# Patient Record
Sex: MALE | Race: WHITE | Employment: OTHER | URBAN - METROPOLITAN AREA
[De-identification: names, ages, dates, MRNs, and addresses within clinical notes are randomized per-mention and may not be internally consistent; named-entity substitution may affect disease eponyms.]

---

## 2018-04-13 PROBLEM — I35.0 NONRHEUMATIC AORTIC VALVE STENOSIS: Status: ACTIVE | Noted: 2018-04-13

## 2018-10-19 PROBLEM — E66.01 OBESITY, MORBID (HCC): Status: ACTIVE | Noted: 2018-10-19

## 2019-01-01 ENCOUNTER — TELEPHONE (OUTPATIENT)
Dept: CASE MANAGEMENT | Age: 74
End: 2019-01-01

## 2019-01-01 ENCOUNTER — HOSPITAL ENCOUNTER (OUTPATIENT)
Dept: CARDIAC CATH/INVASIVE PROCEDURES | Age: 74
Discharge: HOME | End: 2019-12-23
Attending: INTERNAL MEDICINE | Admitting: INTERNAL MEDICINE
Payer: MEDICARE

## 2019-01-01 ENCOUNTER — HOSPITAL ENCOUNTER (OUTPATIENT)
Dept: CT IMAGING | Age: 74
Discharge: HOME | End: 2019-11-20
Attending: INTERNAL MEDICINE
Payer: MEDICARE

## 2019-01-01 ENCOUNTER — HOSPITAL ENCOUNTER (OUTPATIENT)
Dept: ULTRASOUND IMAGING | Age: 74
Discharge: HOME | End: 2019-11-20
Attending: INTERNAL MEDICINE
Payer: MEDICARE

## 2019-01-01 ENCOUNTER — HOSPITAL ENCOUNTER (OUTPATIENT)
Dept: CARDIAC CATH/INVASIVE PROCEDURES | Age: 74
Discharge: HOME | End: 2019-12-16

## 2019-01-01 VITALS
HEIGHT: 70 IN | RESPIRATION RATE: 16 BRPM | SYSTOLIC BLOOD PRESSURE: 142 MMHG | HEART RATE: 78 BPM | WEIGHT: 306 LBS | DIASTOLIC BLOOD PRESSURE: 69 MMHG | BODY MASS INDEX: 43.81 KG/M2 | OXYGEN SATURATION: 97 %

## 2019-01-01 VITALS — WEIGHT: 306 LBS | BODY MASS INDEX: 43.81 KG/M2 | HEIGHT: 70 IN

## 2019-01-01 DIAGNOSIS — I35.0 AORTIC VALVE STENOSIS, ETIOLOGY OF CARDIAC VALVE DISEASE UNSPECIFIED: ICD-10-CM

## 2019-01-01 DIAGNOSIS — I35.0 AORTIC VALVE STENOSIS, ETIOLOGY OF CARDIAC VALVE DISEASE UNSPECIFIED: Primary | ICD-10-CM

## 2019-01-01 DIAGNOSIS — I25.10 CORONARY ARTERY DISEASE INVOLVING NATIVE CORONARY ARTERY OF NATIVE HEART, ANGINA PRESENCE UNSPECIFIED: Primary | ICD-10-CM

## 2019-01-01 LAB
ACT BLD: 191 SECS (ref 70–128)
ANION GAP SERPL CALC-SCNC: 5 MMOL/L (ref 7–16)
ANION GAP SERPL CALC-SCNC: 6 MMOL/L (ref 7–16)
ATRIAL RATE: 80 BPM
BUN SERPL-MCNC: 25 MG/DL (ref 8–23)
BUN SERPL-MCNC: 25 MG/DL (ref 8–23)
CALCIUM SERPL-MCNC: 8.9 MG/DL (ref 8.3–10.4)
CALCIUM SERPL-MCNC: 9.3 MG/DL (ref 8.3–10.4)
CALCULATED P AXIS, ECG09: 31 DEGREES
CALCULATED R AXIS, ECG10: 43 DEGREES
CALCULATED T AXIS, ECG11: 106 DEGREES
CHLORIDE SERPL-SCNC: 110 MMOL/L (ref 98–107)
CHLORIDE SERPL-SCNC: 110 MMOL/L (ref 98–107)
CO2 SERPL-SCNC: 25 MMOL/L (ref 21–32)
CO2 SERPL-SCNC: 26 MMOL/L (ref 21–32)
CREAT BLD-MCNC: 2 MG/DL (ref 0.8–1.5)
CREAT SERPL-MCNC: 1.8 MG/DL (ref 0.8–1.5)
CREAT SERPL-MCNC: 1.93 MG/DL (ref 0.8–1.5)
DIAGNOSIS, 93000: NORMAL
ERYTHROCYTE [DISTWIDTH] IN BLOOD BY AUTOMATED COUNT: 12 % (ref 11.9–14.6)
GLUCOSE SERPL-MCNC: 125 MG/DL (ref 65–100)
GLUCOSE SERPL-MCNC: 130 MG/DL (ref 65–100)
HCT VFR BLD AUTO: 36.2 % (ref 41.1–50.3)
HGB BLD-MCNC: 11.7 G/DL (ref 13.6–17.2)
INR PPP: 1
MAGNESIUM SERPL-MCNC: 2.5 MG/DL (ref 1.8–2.4)
MCH RBC QN AUTO: 31.6 PG (ref 26.1–32.9)
MCHC RBC AUTO-ENTMCNC: 32.3 G/DL (ref 31.4–35)
MCV RBC AUTO: 97.8 FL (ref 79.6–97.8)
NRBC # BLD: 0 K/UL (ref 0–0.2)
P-R INTERVAL, ECG05: 160 MS
PLATELET # BLD AUTO: 319 K/UL (ref 150–450)
PMV BLD AUTO: 10.2 FL (ref 9.4–12.3)
POTASSIUM SERPL-SCNC: 4.9 MMOL/L (ref 3.5–5.1)
POTASSIUM SERPL-SCNC: 5.7 MMOL/L (ref 3.5–5.1)
PROTHROMBIN TIME: 13.4 SEC (ref 11.7–14.5)
Q-T INTERVAL, ECG07: 380 MS
QRS DURATION, ECG06: 112 MS
QTC CALCULATION (BEZET), ECG08: 438 MS
RBC # BLD AUTO: 3.7 M/UL (ref 4.23–5.6)
SODIUM SERPL-SCNC: 141 MMOL/L (ref 136–145)
SODIUM SERPL-SCNC: 141 MMOL/L (ref 136–145)
VENTRICULAR RATE, ECG03: 80 BPM
WBC # BLD AUTO: 8.5 K/UL (ref 4.3–11.1)

## 2019-01-01 PROCEDURE — 93005 ELECTROCARDIOGRAM TRACING: CPT | Performed by: INTERNAL MEDICINE

## 2019-01-01 PROCEDURE — C1887 CATHETER, GUIDING: HCPCS

## 2019-01-01 PROCEDURE — 75574 CT ANGIO HRT W/3D IMAGE: CPT

## 2019-01-01 PROCEDURE — 74011636320 HC RX REV CODE- 636/320: Performed by: INTERNAL MEDICINE

## 2019-01-01 PROCEDURE — 93454 CORONARY ARTERY ANGIO S&I: CPT

## 2019-01-01 PROCEDURE — 74011000258 HC RX REV CODE- 258: Performed by: INTERNAL MEDICINE

## 2019-01-01 PROCEDURE — C1894 INTRO/SHEATH, NON-LASER: HCPCS

## 2019-01-01 PROCEDURE — 74011250637 HC RX REV CODE- 250/637: Performed by: INTERNAL MEDICINE

## 2019-01-01 PROCEDURE — 82565 ASSAY OF CREATININE: CPT

## 2019-01-01 PROCEDURE — 99152 MOD SED SAME PHYS/QHP 5/>YRS: CPT

## 2019-01-01 PROCEDURE — 77030004534 HC CATH ANGI DX INFN CARD -A

## 2019-01-01 PROCEDURE — 74011250636 HC RX REV CODE- 250/636: Performed by: INTERNAL MEDICINE

## 2019-01-01 PROCEDURE — 77030015766

## 2019-01-01 PROCEDURE — 85610 PROTHROMBIN TIME: CPT

## 2019-01-01 PROCEDURE — C1769 GUIDE WIRE: HCPCS

## 2019-01-01 PROCEDURE — 85027 COMPLETE CBC AUTOMATED: CPT

## 2019-01-01 PROCEDURE — 74011000250 HC RX REV CODE- 250: Performed by: INTERNAL MEDICINE

## 2019-01-01 PROCEDURE — 77030029997 HC DEV COM RDL R BND TELE -B

## 2019-01-01 PROCEDURE — 93880 EXTRACRANIAL BILAT STUDY: CPT

## 2019-01-01 PROCEDURE — 71275 CT ANGIOGRAPHY CHEST: CPT

## 2019-01-01 PROCEDURE — 85347 COAGULATION TIME ACTIVATED: CPT

## 2019-01-01 PROCEDURE — 83735 ASSAY OF MAGNESIUM: CPT

## 2019-01-01 PROCEDURE — 80048 BASIC METABOLIC PNL TOTAL CA: CPT

## 2019-01-01 PROCEDURE — 93571 IV DOP VEL&/PRESS C FLO 1ST: CPT

## 2019-01-01 RX ORDER — SODIUM CHLORIDE 0.9 % (FLUSH) 0.9 %
5-40 SYRINGE (ML) INJECTION AS NEEDED
Status: CANCELLED | OUTPATIENT
Start: 2019-01-01

## 2019-01-01 RX ORDER — MIDAZOLAM HYDROCHLORIDE 1 MG/ML
.5-5 INJECTION, SOLUTION INTRAMUSCULAR; INTRAVENOUS
Status: DISCONTINUED | OUTPATIENT
Start: 2019-01-01 | End: 2019-01-01 | Stop reason: HOSPADM

## 2019-01-01 RX ORDER — FENTANYL CITRATE 50 UG/ML
25-100 INJECTION, SOLUTION INTRAMUSCULAR; INTRAVENOUS
Status: DISCONTINUED | OUTPATIENT
Start: 2019-01-01 | End: 2019-01-01 | Stop reason: HOSPADM

## 2019-01-01 RX ORDER — HYDROCODONE BITARTRATE AND ACETAMINOPHEN 5; 325 MG/1; MG/1
1 TABLET ORAL
Status: CANCELLED | OUTPATIENT
Start: 2019-01-01

## 2019-01-01 RX ORDER — HEPARIN SODIUM 200 [USP'U]/100ML
3 INJECTION, SOLUTION INTRAVENOUS CONTINUOUS
Status: DISCONTINUED | OUTPATIENT
Start: 2019-01-01 | End: 2019-01-01 | Stop reason: HOSPADM

## 2019-01-01 RX ORDER — HEPARIN SODIUM 10000 [USP'U]/ML
6000 INJECTION, SOLUTION INTRAVENOUS; SUBCUTANEOUS ONCE
Status: COMPLETED | OUTPATIENT
Start: 2019-01-01 | End: 2019-01-01

## 2019-01-01 RX ORDER — SODIUM CHLORIDE 9 MG/ML
1 INJECTION, SOLUTION INTRAVENOUS AS NEEDED
Status: DISCONTINUED | OUTPATIENT
Start: 2019-01-01 | End: 2019-01-01 | Stop reason: HOSPADM

## 2019-01-01 RX ORDER — SODIUM CHLORIDE 9 MG/ML
75 INJECTION, SOLUTION INTRAVENOUS CONTINUOUS
Status: CANCELLED | OUTPATIENT
Start: 2019-01-01

## 2019-01-01 RX ORDER — SODIUM CHLORIDE 0.9 % (FLUSH) 0.9 %
10 SYRINGE (ML) INJECTION
Status: COMPLETED | OUTPATIENT
Start: 2019-01-01 | End: 2019-01-01

## 2019-01-01 RX ORDER — SODIUM CHLORIDE 9 MG/ML
3 INJECTION, SOLUTION INTRAVENOUS ONCE
Status: COMPLETED | OUTPATIENT
Start: 2019-01-01 | End: 2019-01-01

## 2019-01-01 RX ORDER — DIAZEPAM 5 MG/1
5 TABLET ORAL ONCE
Status: COMPLETED | OUTPATIENT
Start: 2019-01-01 | End: 2019-01-01

## 2019-01-01 RX ORDER — GUAIFENESIN 100 MG/5ML
81-324 LIQUID (ML) ORAL ONCE
Status: DISCONTINUED | OUTPATIENT
Start: 2019-01-01 | End: 2019-01-01 | Stop reason: HOSPADM

## 2019-01-01 RX ORDER — ONDANSETRON 2 MG/ML
4 INJECTION INTRAMUSCULAR; INTRAVENOUS
Status: CANCELLED | OUTPATIENT
Start: 2019-01-01 | End: 2019-01-01

## 2019-01-01 RX ORDER — LIDOCAINE HYDROCHLORIDE 10 MG/ML
1-20 INJECTION INFILTRATION; PERINEURAL ONCE
Status: COMPLETED | OUTPATIENT
Start: 2019-01-01 | End: 2019-01-01

## 2019-01-01 RX ORDER — ACETAMINOPHEN 325 MG/1
650 TABLET ORAL
Status: CANCELLED | OUTPATIENT
Start: 2019-01-01

## 2019-01-01 RX ORDER — SODIUM CHLORIDE 9 MG/ML
1 INJECTION, SOLUTION INTRAVENOUS CONTINUOUS
Status: ACTIVE | OUTPATIENT
Start: 2019-01-01 | End: 2019-01-01

## 2019-01-01 RX ORDER — SODIUM CHLORIDE 0.9 % (FLUSH) 0.9 %
5-40 SYRINGE (ML) INJECTION EVERY 8 HOURS
Status: CANCELLED | OUTPATIENT
Start: 2019-01-01

## 2019-01-01 RX ADMIN — IOPAMIDOL 100 ML: 755 INJECTION, SOLUTION INTRAVENOUS at 13:15

## 2019-01-01 RX ADMIN — MIDAZOLAM 2 MG: 1 INJECTION INTRAMUSCULAR; INTRAVENOUS at 13:22

## 2019-01-01 RX ADMIN — IOPAMIDOL 110 ML: 755 INJECTION, SOLUTION INTRAVENOUS at 13:47

## 2019-01-01 RX ADMIN — HEPARIN SODIUM 6000 UNITS: 10000 INJECTION INTRAVENOUS; SUBCUTANEOUS at 13:36

## 2019-01-01 RX ADMIN — LIDOCAINE HYDROCHLORIDE 3 ML: 10 INJECTION, SOLUTION INFILTRATION; PERINEURAL at 14:00

## 2019-01-01 RX ADMIN — SODIUM CHLORIDE 100 ML: 900 INJECTION, SOLUTION INTRAVENOUS at 13:15

## 2019-01-01 RX ADMIN — Medication 10 ML: at 13:15

## 2019-01-01 RX ADMIN — NITROGLYCERIN 0.15 MG: 200 INJECTION, SOLUTION INTRAVENOUS at 13:41

## 2019-01-01 RX ADMIN — FENTANYL CITRATE 50 MCG: 50 INJECTION, SOLUTION INTRAMUSCULAR; INTRAVENOUS at 13:22

## 2019-01-01 RX ADMIN — SODIUM CHLORIDE 3 ML/KG/HR: 900 INJECTION, SOLUTION INTRAVENOUS at 10:40

## 2019-01-01 RX ADMIN — HEPARIN SODIUM 3 ML/HR: 5000 INJECTION, SOLUTION INTRAVENOUS; SUBCUTANEOUS at 13:15

## 2019-01-01 RX ADMIN — HEPARIN SODIUM 2 ML: 10000 INJECTION INTRAVENOUS; SUBCUTANEOUS at 13:26

## 2019-01-01 RX ADMIN — DIAZEPAM 5 MG: 5 TABLET ORAL at 11:39

## 2019-01-01 RX ADMIN — SODIUM CHLORIDE 1 ML/KG/HR: 900 INJECTION, SOLUTION INTRAVENOUS at 09:15

## 2019-08-26 PROBLEM — E11.21 TYPE 2 DIABETES WITH NEPHROPATHY (HCC): Status: ACTIVE | Noted: 2019-01-01

## 2019-11-20 NOTE — PROGRESS NOTES
IVF started for TAVR CT scan today. Pt is A&O x4 and resting comfortably in the chair in CT holding bay. Pt will be monitored by holding bay staff with plans for CT at 1200.

## 2019-11-24 PROBLEM — I65.23 BILATERAL CAROTID ARTERY STENOSIS: Status: ACTIVE | Noted: 2019-01-01

## 2019-12-09 NOTE — TELEPHONE ENCOUNTER
Pt and spouse both have a cold and are on antibiotics and pt wants to change the date of C. Moved this to 12/16 0800 arrival at 0600. Instructed pt and spuse that he instructions from previous would remain the same with only the date changed. Went over instruction in detail with spuse and pt again and both vu.     Scheduled for 12/16 at 0800, arrival time 2 hr prior. Patient instructed to bring all home medications in labeled bottles on the day of procedure. NPO after midnight on 12/16 except for medications. Patient informed to take 81 mg x 4 on the day of procedure.      Hold Lisinopril on 12/16.   Hold Glucovance 24 hr prior to procedure  Take 1/2 of insulin dose the night prior to the procedure and hold on 12/16 am, day of procedure.  Richie Montero, TAVR Coordinator

## 2019-12-16 NOTE — TELEPHONE ENCOUNTER
Pt called to say him and his wife are sick and wants to cancel his cardiac cath for Monday at 0800. Told pt we would reschedule for a time after he feels well and to notify us.  I notified Kenny Alonzo, from cathlab, also to inform of cancellation for Monday am.    Hina Adan, TAVR Coordinator

## 2019-12-17 NOTE — TELEPHONE ENCOUNTER
Called and spoke to pt and spouse to reschedule [facility]. Plans for 12/23 1330 arrival at 1130.    Scheduled for 12/23 at 1330, arrival time 2 hr prior. Patient instructed to bring all home medications in labeled bottles on the day of procedure. NPO after midnight on 12/23 except for medications. Patient informed to take 81 mg x 4 on the day of procedure.      Hold Lisinopril on 12/16.   Hold Glucovance 24 hr prior to procedure  Take 1/2 of insulin dose the night prior to the procedure and hold on 12/16 am, day of procedure.  Katarzyna Cooper, TAVR Coordinator

## 2019-12-21 NOTE — PROGRESS NOTES
Patient pre-assessment complete for [facility] poss with DR De Oliveira scheduled for 19 at 2pm, arrival time 11:30am - HYDRATION. Patient verified using . Patient instructed to bring all home medications in labeled bottles on the day of procedure. NPO status reinforced. Patient informed to take a full dose aspirin 325mg  or 81 mg x 4 on the day of procedure. Patient instructed to HOLD glucovance, insulin & lisinopril/HCTZ on the am of procedure & to take 1/2 insulin the am of procedure. Instructed they can take all other medications excluding vitamins & supplements. Patient verbalizes understanding of all instructions & denies any questions at this time. Pt was asked to come in earlier secondary to need for HYDRATION & secondary to opening in the schedule, states that the earliest he can be there is around 11:30am, informed that we could take care of him as early as he can get there. Voiced good understanding

## 2019-12-23 NOTE — PROCEDURES
Brief Cardiac Procedure Note Patient: Rafia Diaz MRN: [id number]  SSN: [id number] YOB: 1945  Age: 69 y.o. Sex: male Date of Procedure: 12/23/2019 Pre-procedure Diagnosis: Aortic Stenosis Post-procedure Diagnosis: Multi-vessel CAD and aoritc stenosis. . 
 
Procedure: Left Heart Catheterization Brief Description of Procedure: See above Performed By: Kristie Duverney, MD  
 
Assistants: None Anesthesia: Moderate Sedation Estimated Blood Loss: Less than 10 mL Specimens: None Implants: None Findings: Multivessel CAD Complications: None Recommendations: CTS evaluation for CABG and AVR evaluation. Graft Targets:   LAD, OM and RCA Signed By: Kristie Duverney, MD   
 December 23, 2019

## 2019-12-23 NOTE — PROGRESS NOTES
TRANSFER - OUT REPORT: 
 
Verbal report given to [facility] rn(name) on [facility]  being transferred to cpru(unit) for routine progression of care Report consisted of patients Situation, Background, Assessment and  
Recommendations(SBAR). Information from the following report(s) SBAR was reviewed with the receiving nurse. Lines:  
Peripheral IV 12/23/19 Left;Posterior Hand (Active) Peripheral IV 12/23/19 Posterior;Right Hand (Active) Opportunity for questions and clarification was provided. Patient transported with: 
 Registered Nurse [facility] Dr Elma Mehta CV consult Right wrist tr band 8ml No bleeding or hematoma Versed 2mg 
fenatnyl 50mcg

## 2019-12-23 NOTE — PROGRESS NOTES
Patient received to [address] [place] [address] room # 6  Ambulatory from [facility]. Patient scheduled for [facility] today with Dr Aric Teague. Procedure reviewed & questions answered, voiced good understanding consent obtained & placed on chart. All medications and medical history reviewed. Will prep patient per orders. Patient & family updated on plan of care. The patient has a fraility score of 4-VULNERABLE, based on patient does requires cane assistance to prep room, patient A&Ox3.

## 2019-12-23 NOTE — H&P
[facility] CARDIOLOGY History & Physical 
            
 
 
Subjective:  
 
Patient is a 56-year-old male with severe symptomatic aortic stenosis. Significant dyspnea with exertion. Cardiac CT scan was performed that showed significant multivessel coronary disease. He presents today for cardiac catheterization. He is nervous about this procedure. He denies any palpitations or tachycardia. Dyspnea with exertion, stable. No chest pain. Past Medical History:  
Diagnosis Date  BPH (benign prostatic hyperplasia) 1/14/2013  DM type 2 (diabetes mellitus, type 2) ([facility] [address] dx 2004 Type 2, avg fbs 98 - 120, recognizes hypo at 66, last HA1C was 9.2 on 5/2014  Dyspnea   
 at times, Uses Albuterol inhaler when needed (last used first of aug 2014)  Gout 1/14/2013  HLD (hyperlipidemia) 1/14/2013  
 HTN (hypertension)   
 controlled with meds  Morbid obesity ([facility] [address] 9/3/14 BMI 41.7  Psychiatric disorder   
 anxiety, controlled with buspar  Seasonal allergic rhinitis   
 treated with Allegra  Unspecified sleep apnea 2016  
 per patient is tolerating cpap at this time Past Surgical History:  
Procedure Laterality Date  HX CATARACT REMOVAL Bilateral 2012 [facility] [employer] KYPHOPLASTY  Sept 2014 T12  
 HX ORTHOPAEDIC    
 HX TONSIL AND ADENOIDECTOMY Allergies Allergen Reactions  Amoxicillin Rash  Keflex [Cephalexin] Rash  Pcn [Penicillins] Other (comments) \"felt closed in\". No rash Social History Tobacco Use  Smoking status: Never Smoker  Smokeless tobacco: Never Used Substance Use Topics  Alcohol use: Yes Alcohol/week: 0.0 standard drinks Comment: occ;  
  
FH:  
Family History Problem Relation Age of Onset  Lung Disease Mother  Diabetes Mother  Cancer Father   
     lympnode cancer  No Known Problems Brother  Cancer Other   
     fmh lymphoma  Diabetes Other   
     fmhx  Diabetes Maternal Grandfather Review of Systems Constitutional: Negative for chills and fever. HENT: Negative for tinnitus. Eyes: Negative for blurred vision. Respiratory: Positive for shortness of breath. Negative for cough. Cardiovascular: Negative for orthopnea. Gastrointestinal: Negative for abdominal pain and nausea. Genitourinary: Negative for dysuria. Musculoskeletal: Positive for joint pain. Skin: Negative for rash. Neurological: Negative for tremors, sensory change and headaches. Endo/Heme/Allergies: Does not bruise/bleed easily. Psychiatric/Behavioral: Negative for depression and suicidal ideas. Objective:  
 
 
Visit Vitals /82 (BP 1 Location: Left arm, BP Patient Position: At rest) Pulse 90 Resp 18 Ht 5' 10\" (1.778 m) Wt 138.8 kg (306 lb) SpO2 97% BMI 43.91 kg/m² No intake/output data recorded. No intake/output data recorded. Physical Exam 
HENT:  
   Head: Atraumatic. Eyes:  
   Conjunctiva/sclera: Conjunctivae normal.  
   Pupils: Pupils are equal, round, and reactive to light. Neck:  
   Thyroid: No thyromegaly. Vascular: No JVD. Cardiovascular:  
   Rate and Rhythm: Normal rate and regular rhythm. Heart sounds: Murmur present. Pulmonary:  
   Effort: Pulmonary effort is normal.  
   Breath sounds: Normal breath sounds. Abdominal:  
   General: Bowel sounds are normal. There is no distension. Palpations: Abdomen is soft. Tenderness: There is no tenderness. Skin: 
   General: Skin is warm. Findings: No rash. Neurological:  
   Mental Status: He is alert and oriented to person, place, and time. Psychiatric:     
   Mood and Affect: Mood normal.  
 
 
 
 
 
Data Review:  
Labs:  
Recent Labs  
  12/23/19 
1032   
K 5.7* MG 2.5*  
BUN 25* CREA 1.93* * WBC 8.5 HGB 11.7* HCT 36.2*  
 INR 1.0 No results found for: JENNA Chahal Assessment/Plan: Active Problems: DM type 2 (diabetes mellitus, type 2) ([facility] [address] (1/14/2013) Resume home medications. After cardiac catheterization. HTN (hypertension) (1/14/2013) Significant hyperkalemia noted on labs. Will recheck. If persistent, we'll need to stop lisinopril and start amlodipine for blood pressure control. HLD (hyperlipidemia) (1/14/2013) Continue Zocor therapy. Nonrheumatic aortic valve stenosis (4/13/2018) Left heart catheterization today. Concern for multivessel disease based on cardiac CT scan. Discussed risk of cardiac catheterization with patient in detail. The risk of a major complication (death, MI or major embolization) during or after cardiac catheterization is below 1%. Risk of mortality is under 0.1%. Local complications at the site of catheter insertion were discussed. This includes but not limited to:  acute thrombosis, distal embolization, dissection, poorly controlled bleeding, hematoma, retroperitoneal hemorrhage, pseudoaneurysm or AV fistula formation. Other potential serious complication were discussed including risk of cardiac arrhythmias, allergic reactions, atheroemboli and acute kidney injury. Obesity, morbid ([facility] [address] (10/19/2018) Needs weight loss. ERON Solorzano 
12/23/2019 11:50 AM

## 2019-12-23 NOTE — CONSULTS
Shelby Ruiz. MD Flynn Edmonds. MD Ovidio Goodwin 12/23/2019        1945 REFERRING PHYSICIAN:  Dr. Vianey Olivia HISTORY OF PRESENT ILLNESS: 
The patient is a 69 y.o. male who has been followed in the office by Dr. Vianey Olivia. He complained of worsening exertional dyspnea and chest pressure. Echo showed progression of AS with JUAN MIGUEL of 0.82cm2, mean gradient of 40mmHg and peak gradient of 61mmHg. TAVR procedure was discussed. He underwent CT scan that showed multivessel coronary artery disease with calcification and evidence of mixed plaque with severe stenosis of the LAD. LHC was then planned. He underwent cardiac catheterization today that showed severe multivessel disease. Risk factors include DM, HTN, dyslipidemia ** No history of stroke, TIA, prior cardiac surgery, prior vascular surgery, anesthetic complication, lung disease, DVT or PE, GI bleeding Past Medical History:  
Diagnosis Date  BPH (benign prostatic hyperplasia) 1/14/2013  DM type 2 (diabetes mellitus, type 2) ([facility] [address] dx 2004 Type 2, avg fbs 98 - 120, recognizes hypo at 67, last HA1C was 9.2 on 5/2014  Dyspnea   
 at times, Uses Albuterol inhaler when needed (last used first of aug 2014)  Gout 1/14/2013  HLD (hyperlipidemia) 1/14/2013  
 HTN (hypertension)   
 controlled with meds  Morbid obesity ([facility] [address] 9/3/14 BMI 41.7  Psychiatric disorder   
 anxiety, controlled with buspar  Seasonal allergic rhinitis   
 treated with Allegra  Unspecified sleep apnea 2016  
 per patient is tolerating cpap at this time Past Surgical History:  
Procedure Laterality Date  HX CATARACT REMOVAL Bilateral 2012 [facility] KYPHOPLASTY  Sept 2014 T12  
 HX ORTHOPAEDIC    
 HX TONSIL AND ADENOIDECTOMY Family History Problem Relation Age of Onset  Lung Disease Mother  Diabetes Mother  Cancer Father   
     lympnode cancer  No Known Problems Brother  Cancer Other   
     fmh lymphoma  Diabetes Other   
     fmhx  Diabetes Maternal Grandfather Social History Socioeconomic History  Marital status:  Spouse name: Not on file  Number of children: Not on file  Years of education: Not on file  Highest education level: Not on file Occupational History  Not on file Social Needs  Financial resource strain: Not on file  Food insecurity:  
  Worry: Not on file Inability: Not on file  Transportation needs:  
  Medical: Not on file Non-medical: Not on file Tobacco Use  Smoking status: Never Smoker  Smokeless tobacco: Never Used Substance and Sexual Activity  Alcohol use: Yes Alcohol/week: 0.0 standard drinks Comment: occ;  
 Drug use: Not on file  Sexual activity: Not on file Lifestyle  Physical activity:  
  Days per week: Not on file Minutes per session: Not on file  Stress: Not on file Relationships  Social connections:  
  Talks on phone: Not on file Gets together: Not on file Attends Gnosticist service: Not on file Active member of club or organization: Not on file Attends meetings of clubs or organizations: Not on file Relationship status: Not on file  Intimate partner violence:  
  Fear of current or ex partner: Not on file Emotionally abused: Not on file Physically abused: Not on file Forced sexual activity: Not on file Other Topics Concern  Not on file Social History Narrative  Not on file Allergies Allergen Reactions  Amoxicillin Rash  Keflex [Cephalexin] Rash  Pcn [Penicillins] Other (comments) \"felt closed in\". No rash No current facility-administered medications on file prior to encounter. Current Outpatient Medications on File Prior to Encounter Medication Sig Dispense Refill  insulin glargine (LANTUS SOLOSTAR U-100 INSULIN) 100 unit/mL (3 mL) inpn 42 units bid sq  Indications: type 2 diabetes mellitus (Patient taking differently: 75 units in am  Indications: type 2 diabetes mellitus) 15 Pen 3  
 insulin aspart U-100 (NOVOLOG FLEXPEN U-100 INSULIN) 100 unit/mL (3 mL) inpn INJECT 45 UNITS UNDER THE SKIN AT LARGEST MEAL 45 mL 6  
 lisinopril-hydroCHLOROthiazide (PRINZIDE, ZESTORETIC) 20-12.5 mg per tablet Take 1 Tab by mouth daily. 90 Tab 3  
 tamsulosin (FLOMAX) 0.4 mg capsule Take 1 Cap by mouth daily. 90 Cap 3  
 simvastatin (ZOCOR) 40 mg tablet Take 1 Tab by mouth nightly. 90 Tab 3  
 glyBURIDE-metFORMIN (GLUCOVANCE) 5-500 mg per tablet Take 2 Tabs by mouth two (2) times daily (with meals). (Patient taking differently: Take 2 Tabs by mouth Daily (before breakfast). ) 360 Tab 3  
 busPIRone (BUSPAR) 10 mg tablet Take 1 Tab by mouth three (3) times daily. 270 Tab 3  
 allopurinol (ZYLOPRIM) 300 mg tablet Take 1 Tab by mouth daily. 90 Tab 3  
 aspirin delayed-release 81 mg tablet Take  by mouth daily.  lutein 20 mg tab Take 1 tablet by mouth daily.  coenzyme q10-vitamin e (COQ10 ) 100-100 mg-unit cap Take 300 mg by mouth daily.  ascorbic acid (VITAMIN C) 500 mg tablet Take 1,000 mg by mouth daily.  multivitamins-minerals-lutein (CENTRUM SILVER) Tab Take 1 tablet by mouth daily.  albuterol (PROVENTIL HFA) 90 mcg/actuation inhaler Take 2 Puffs by inhalation every six (6) hours as needed for Wheezing. 3 Inhaler 3 REVIEW OF SYSTEMS: 
Review of Systems Constitution: Negative for chills, fever, malaise/fatigue, weight gain and weight loss. HENT: Negative for ear pain, hearing loss, nosebleeds, sore throat and tinnitus. Eyes: Negative for blurred vision, vision loss in left eye and vision loss in right eye. Cardiovascular: Positive for chest pain and dyspnea on exertion. Negative for leg swelling, near-syncope, orthopnea, palpitations, paroxysmal nocturnal dyspnea and syncope. Respiratory: Positive for shortness of breath. Negative for cough, hemoptysis, sputum production and wheezing. Endocrine: Negative for cold intolerance, heat intolerance and polydipsia. Hematologic/Lymphatic: Does not bruise/bleed easily. Skin: Negative for color change and rash. Musculoskeletal: Negative for back pain, joint pain, joint swelling and myalgias. Gastrointestinal: Negative for abdominal pain, constipation, diarrhea, dysphagia, heartburn, hematemesis, melena, nausea and vomiting. Genitourinary: Negative for dysuria, frequency, hematuria and urgency. Neurological: Negative for difficulty with concentration, dizziness, headaches, light-headedness, numbness, paresthesias, seizures, vertigo and weakness. Psychiatric/Behavioral: Negative for altered mental status and depression. Physical Exam 
Vitals:  
 12/23/19 1433 12/23/19 1446 12/23/19 1505 12/23/19 1515 BP: 123/85 142/68 Pulse: 81 77 78 75 Resp:      
SpO2: 96% 97% Weight:      
Height:      
 
 
Physical Exam: 
General: Well Developed, Well Nourished, No Acute Distress HEENT: Normocephalic, pupils equal and round, no scleral icterus Neck: supple, no JVD Chest wall: No deformity Heart: S1S2 with RRR with grade III/VI VERONICA Lungs: Clear throughout auscultation bilaterally without adventitious sounds Vascular: Pulses are full and equal. There is no venous stasis disease. Abd: soft, nontender, nondistended, with good bowel sounds, no pulsatile masses Ext: warm, no edema, calves supple/nontender, pulses 2+ bilaterally Skin: warm and dry, no rashes, cyanosis, jaundice, ecchymoses or evidence of skin breakdown Psychiatric: Normal mood and affect Neurologic: Alert and oriented X 3, no focal deficit noted Labs:  
Recent Labs  
  12/23/19 
1153 12/23/19 
1032  141  
K 4.9 5.7* MG  --  2.5*  
BUN 25* 25* CREA 1.80* 1.93* * 130* WBC  --  8.5 HGB  --  11.7* HCT  --  36.2*  
PLT  --  319 INR  --  1.0 Lab Results Component Value Date/Time Cholesterol, total 123 09/04/2019 09:07 AM  
 HDL Cholesterol 32 (L) 09/04/2019 09:07 AM  
 LDL, calculated 68 09/04/2019 09:07 AM  
 VLDL, calculated 23 09/04/2019 09:07 AM  
 Triglyceride 114 09/04/2019 09:07 AM  
 
 
Assessment:  
 
Active Problems: 
  DM type 2 (diabetes mellitus, type 2) ([facility] [address] (1/14/2013) HTN (hypertension) (1/14/2013) HLD (hyperlipidemia) (1/14/2013) Nonrheumatic aortic valve stenosis (4/13/2018) Obesity, morbid ([facility] [address] (10/19/2018) Plan:  
 
Ruba Richardson is to see preoperative teaching film that thoroughly discusses procedure, risks, and possible complications. Risks, benefits, and alternatives were discussed to include, but not limited to: 1. Bleeding 2. Arrhythmia 3. Infection including mediastinitis 4. Myocardial infarction 5. Need for reoperation 6. Renal failure 7. Respiratory failure 8. Stroke 9. Potential death Dr. Jose J Fragoso has personally viewed the cardiac catheterization films, echocardiogram, and labs. The mortality risk for CABG/AVR is 4.691%. Pt wishes to proceed with CABG/AVR. Tentatively, surgery is scheduled for January 10th.  
 
Shruthi Wright PA-C

## 2019-12-23 NOTE — DISCHARGE INSTRUCTIONS
DO NOT TAKE METFORMIN (GLUCOPHAGE) UNTIL Wednesday AFTERNOON. HEART CATHETERIZATION/ANGIOGRAPHY DISCHARGE INSTRUCTIONS    1. Check puncture site frequently for swelling or bleeding. If there is any bleeding, lie down and apply pressure over the area with a clean towel or washcloth and call 911. Notify your doctor for any redness, swelling, drainage, or oozing from the puncture site. Notify your doctor for any fever or chills. 2. If the extremity becomes cold, numb, or painful call Dr. Amie Montejo at [phone].  3. Activity should be limited for the next 48 hours. Avoid pushing, pulling and bending of affected wrist for 48 hours. No heavy lifting (anything over 5 pounds) for 3 days. No driving for 48 hours. 4. You may resume your usual diet. Drink more fluids than usual.  5. Have a responsible person drive you home and stay with you for at least 24 hours after your heart catheterization/angiography. 6. You may remove bandage from your right arm  in 24 hours. You may shower in 24 hours. No tub baths, hot tubs, or swimming for 1 week. Do not place any lotions, creams, powders, or ointments over puncture site for 1 week. You may place a clean band-aid over the puncture site each day for 5 days. Change daily. I have read the above instructions and have had the opportunity to ask questions.

## 2019-12-23 NOTE — PROCEDURES
[address] [facility] 
CARDIAC CATH Name:  Galdino Solis 
MR#:  [id number] :  1945 ACCOUNT #:  [de-identified] DATE OF SERVICE:  2019 PROCEDURES PERFORMED: 
1. Coronary angiography via the right radial approach. 2.  iFR of LAD. PREOPERATIVE DIAGNOSES:  Severe aortic stenosis with dyspnea with exertion. Cardiac CT scan suggests multivessel coronary artery disease. POSTOPERATIVE DIAGNOSIS:  Multivessel coronary artery disease. SURGEON:  Concepcion De Oliveira MD 
 
SURGICAL ASSISTANT:  None. ESTIMATED BLOOD LOSS:  Less than 5 mL. SPECIMENS REMOVED:  None. COMPLICATIONS:  None. IMPLANTS:  None. ANESTHESIA:  The patient received moderate supervised conscious with 2 mg Versed, 50 mcg fentanyl. Sedation start time was 13:23 with a procedure completion time of 13:49. Sedation was administered by Sam Earl under my supervision. FINDINGS:  As below. TECHNICAL FACTORS:  After informed consent was obtained, the patient was brought to the cardiac catheterization lab. The right radial artery was prepped and draped in usual sterile fashion. Utilizing modified Seldinger technique and micropuncture needle, the right radial artery was entered. A 6-Lithuanian Terumo Slender sheath was placed without difficulty. A radial cocktail consisting of 2000 units of heparin, 2 mg of verapamil, and 200 mcg of nitroglycerin was administered. A 5-Lithuanian Tiger 4.0 catheter was used to select and engage the ostium of the left main coronary artery and right coronary artery respectively. There was suboptimal visualization of the LAD due to subselective engagement of the left circumflex. Therefore, a JL-3.5 catheter was used to select and engage the ostium of the left main coronary. Selective injection was then performed with visualization of the LAD. Following the diagnostic procedure, 6000 units of heparin was given.   A JL-3.5, 6-Estonian guide was used To select and engage the ostium of the left main coronary. A Verrata wire was placed in the ostium of the left main coronary and appropriately normalized. 250 mcg of intracoronary nitroglycerin was given. At this point, the iFR wire was placed in the distal LAD. IFR was measured at 0.78 and 0.82. Both indicating hemodynamically significant stenosis of the proximal and mid LAD. The wire was pulled back and noted that it would remain normalized at the left main coronary artery. Based on this, it is felt the patient would benefit from revascularizations of his LAD. At the conclusion of the diagnostic procedure, the radial sheath was removed and a pneumatic band was placed with good hemostasis. No complications were encountered. CONTRAST:  Isovue 110 HEMODYNAMIC RESULTS: 
1. Aortic pressure was 110/55 with a mean of 74. 
2.  No left ventricular end-diastolic pressure was obtained as I did not cross the aortic valve. ANGIOGRAPHIC RESULTS: 
1. Left main coronary artery:  Large-caliber vessel. Normal. 
2.  LAD:  Contains a 50%-60% proximal stenosis and diffuse 50% mid stenosis. Distal vessel is patent. Contains 40% distal stenosis and 80% apical stenosis. 3.  Right first diagonal artery:  Medium-caliber vessel. 20% proximal stenosis. 4.  Left circumflex:  Medium-caliber vessel. 80% ostial stenosis. Ongoing circumflex is patent. 5.  First obtuse marginal.  Medium-caliber vessel. 60% ostial/proximal stenosis. 6.  Right coronary artery:  Medium-caliber dominant vessel. Contains a 90% proximal stenosis. Mid distal vessel contains mild luminal irregularities. 7.  iFR of LAD 0.78 and 0.82 on two separate measurements. CONCLUSIONS: 
1. Severe multivessel coronary artery disease in the setting of known severe aortic stenosis. 2.  Successful iFR of LAD showing hemodynamically significant stenosis of the proximal mid LAD. PLAN:  CT surgery evaluation for surgical aortic valve replacement and three-vessel coronary bypass grafting. MD TALAT [email or web address] 
D:  12/23/2019 13:57 
T:  12/23/2019 14:27 
JOB #:  [id number] CC:  [facility] Cardiology

## 2019-12-23 NOTE — PROGRESS NOTES
Report received from [address] [place] [address]. Procedural findings communicated. Intra procedural  medication administration reviewed. Progression of care discussed. Patient received into [address] [facility] 2 post sheath removal.  
 
Access site without bleeding or swelling yes Dressing dry and intact yes Patient instructed to limit movement to right upper extremity Routine post procedural vital signs and site assessment initiated yes

## 2019-12-23 NOTE — PROGRESS NOTES
Terumo band completely deflated. 1535 Terumo band removed from right wrist using sterile technique. Sterile dressing applied. No signs and symptoms of bleeding, oozing or hematoma.

## 2019-12-23 NOTE — PROGRESS NOTES
Patient up to bedside, vital signs and site stable. Patient ambulated to bathroom without difficulty. Patient voided without difficulty. Vascular site stable. Discharge instructions and home medications reviewed with patient. Time allowed for questions and answers. 1605 Patient ambulated second time without difficulty. Site stable after ambulation. Peripheral IV sites dc'd without difficulty with tips intact. 1610 Patient discharged to home with family.

## 2019-12-31 NOTE — PROCEDURES
[address] [facility]  CARDIAC CATH    Name:  Loyd Webb  MR#:  [id number]  :  1945  ACCOUNT #:  [de-identified]  DATE OF SERVICE:  2019    PROCEDURES PERFORMED:  1. Coronary angiography via the right radial approach. 2.  iFR of LAD. PREOPERATIVE DIAGNOSES:  Severe aortic stenosis with dyspnea with exertion. Cardiac CT scan suggests multivessel coronary artery disease. POSTOPERATIVE DIAGNOSIS:  Multivessel coronary artery disease. SURGEON:  Ela De Oliveira MD    SURGICAL ASSISTANT:  None. ESTIMATED BLOOD LOSS:  Less than 5 mL. SPECIMENS REMOVED:  None. COMPLICATIONS:  None. IMPLANTS:  None. ANESTHESIA:  The patient received moderate supervised conscious with 2 mg Versed, 50 mcg fentanyl. Sedation start time was 13:23 with a procedure completion time of 13:49. Sedation was administered by Aamir Martínez under my supervision. FINDINGS:  As below. TECHNICAL FACTORS:  After informed consent was obtained, the patient was brought to the cardiac catheterization lab. The right radial artery was prepped and draped in usual sterile fashion. Utilizing modified Seldinger technique and micropuncture needle, the right radial artery was entered. A 6-Japanese Terumo Slender sheath was placed without difficulty. A radial cocktail consisting of 2000 units of heparin, 2 mg of verapamil, and 200 mcg of nitroglycerin was administered. A 5-Japanese Tiger 4.0 catheter was used to select and engage the ostium of the left main coronary artery and right coronary artery respectively. There was suboptimal visualization of the LAD due to subselective engagement of the left circumflex. Therefore, a JL-3.5 catheter was used to select and engage the ostium of the left main coronary. Selective injection was then performed with visualization of the LAD. Following the diagnostic procedure, 6000 units of heparin was given.   A JL-3.5, 6-Japanese guide was used To select and engage the ostium of the left main coronary. A Verrata wire was placed in the ostium of the left main coronary and appropriately normalized. 250 mcg of intracoronary nitroglycerin was given. At this point, the iFR wire was placed in the distal LAD. IFR was measured at 0.78 and 0.82. Both indicating hemodynamically significant stenosis of the proximal and mid LAD. The wire was pulled back and noted that it would remain normalized at the left main coronary artery. Based on this, it is felt the patient would benefit from revascularizations of his LAD. At the conclusion of the diagnostic procedure, the radial sheath was removed and a pneumatic band was placed with good hemostasis. No complications were encountered. CONTRAST:  Isovue 110    HEMODYNAMIC RESULTS:  1. Aortic pressure was 110/55 with a mean of 74.  2.  No left ventricular end-diastolic pressure was obtained as I did not cross the aortic valve. ANGIOGRAPHIC RESULTS:  1. Left main coronary artery:  Large-caliber vessel. Normal.  2.  LAD:  Contains a 50%-60% proximal stenosis and diffuse 50% mid stenosis. Distal vessel is patent. Contains 40% distal stenosis and 80% apical stenosis. 3.  Right first diagonal artery:  Medium-caliber vessel. 20% proximal stenosis. 4.  Left circumflex:  Medium-caliber vessel. 80% ostial stenosis. Ongoing circumflex is patent. 5.  First obtuse marginal.  Medium-caliber vessel. 60% ostial/proximal stenosis. 6.  Right coronary artery:  Medium-caliber dominant vessel. Contains a 90% proximal stenosis. Mid distal vessel contains mild luminal irregularities. 7.  iFR of LAD 0.78 and 0.82 on two separate measurements. CONCLUSIONS:  1. Severe multivessel coronary artery disease in the setting of known severe aortic stenosis. 2.  Successful iFR of LAD showing hemodynamically significant stenosis of the proximal mid LAD.     PLAN:  CT surgery evaluation for surgical aortic valve replacement and three-vessel coronary bypass grafting.       MD TALAT [email or web address]  D:  12/23/2019 13:57  T:  12/23/2019 14:27  JOB #:  [id number]  CC:  [facility] Cardiology

## 2020-01-01 ENCOUNTER — APPOINTMENT (OUTPATIENT)
Dept: GENERAL RADIOLOGY | Age: 75
DRG: 219 | End: 2020-01-01
Attending: THORACIC SURGERY (CARDIOTHORACIC VASCULAR SURGERY)
Payer: MEDICARE

## 2020-01-01 ENCOUNTER — APPOINTMENT (OUTPATIENT)
Dept: GENERAL RADIOLOGY | Age: 75
DRG: 219 | End: 2020-01-01
Attending: INTERNAL MEDICINE
Payer: MEDICARE

## 2020-01-01 ENCOUNTER — APPOINTMENT (OUTPATIENT)
Dept: GENERAL RADIOLOGY | Age: 75
DRG: 871 | End: 2020-01-01
Attending: INTERNAL MEDICINE
Payer: MEDICARE

## 2020-01-01 ENCOUNTER — APPOINTMENT (OUTPATIENT)
Dept: GENERAL RADIOLOGY | Age: 75
End: 2020-01-01
Attending: INTERNAL MEDICINE
Payer: MEDICARE

## 2020-01-01 ENCOUNTER — PATIENT OUTREACH (OUTPATIENT)
Dept: CASE MANAGEMENT | Age: 75
End: 2020-01-01

## 2020-01-01 ENCOUNTER — APPOINTMENT (OUTPATIENT)
Dept: GENERAL RADIOLOGY | Age: 75
DRG: 871 | End: 2020-01-01
Attending: NURSE PRACTITIONER
Payer: MEDICARE

## 2020-01-01 ENCOUNTER — HOME CARE VISIT (OUTPATIENT)
Dept: SCHEDULING | Facility: HOME HEALTH | Age: 75
End: 2020-01-01
Payer: MEDICARE

## 2020-01-01 ENCOUNTER — HOSPICE ADMISSION (OUTPATIENT)
Dept: HOSPICE | Facility: HOSPICE | Age: 75
End: 2020-01-01
Payer: MEDICARE

## 2020-01-01 ENCOUNTER — HOSPITAL ENCOUNTER (OUTPATIENT)
Age: 75
Discharge: NURSING, REHAB OR LONG-TERM CARE | End: 2020-04-14
Attending: INTERNAL MEDICINE | Admitting: INTERNAL MEDICINE
Payer: MEDICARE

## 2020-01-01 ENCOUNTER — HOSPITAL ENCOUNTER (OUTPATIENT)
Dept: SURGERY | Age: 75
Discharge: HOME | End: 2020-01-08
Payer: MEDICARE

## 2020-01-01 ENCOUNTER — HOSPITAL ENCOUNTER (OUTPATIENT)
Age: 75
Discharge: HOME | End: 2020-05-11
Attending: SURGERY | Admitting: SURGERY
Payer: MEDICARE

## 2020-01-01 ENCOUNTER — APPOINTMENT (OUTPATIENT)
Dept: GENERAL RADIOLOGY | Age: 75
DRG: 871 | End: 2020-01-01
Attending: EMERGENCY MEDICINE
Payer: MEDICARE

## 2020-01-01 ENCOUNTER — HOSPITAL ENCOUNTER (INPATIENT)
Age: 75
LOS: 33 days | Discharge: NURSING, REHAB OR LONG-TERM CARE | DRG: 219 | End: 2020-02-12
Attending: THORACIC SURGERY (CARDIOTHORACIC VASCULAR SURGERY) | Admitting: THORACIC SURGERY (CARDIOTHORACIC VASCULAR SURGERY)
Payer: MEDICARE

## 2020-01-01 ENCOUNTER — HOSPITAL ENCOUNTER (OUTPATIENT)
Dept: LAB | Age: 75
Discharge: HOME | End: 2020-05-19
Payer: MEDICARE

## 2020-01-01 ENCOUNTER — HOSPITAL ENCOUNTER (INPATIENT)
Age: 75
LOS: 38 days | Discharge: NURSING, REHAB OR LONG-TERM CARE | DRG: 871 | End: 2020-03-24
Attending: EMERGENCY MEDICINE | Admitting: INTERNAL MEDICINE
Payer: MEDICARE

## 2020-01-01 ENCOUNTER — APPOINTMENT (OUTPATIENT)
Dept: ULTRASOUND IMAGING | Age: 75
DRG: 219 | End: 2020-01-01
Attending: NURSE PRACTITIONER
Payer: MEDICARE

## 2020-01-01 ENCOUNTER — HOSPITAL ENCOUNTER (OUTPATIENT)
Dept: ULTRASOUND IMAGING | Age: 75
Discharge: HOME | End: 2020-01-08
Attending: PHYSICIAN ASSISTANT
Payer: MEDICARE

## 2020-01-01 ENCOUNTER — APPOINTMENT (OUTPATIENT)
Dept: CT IMAGING | Age: 75
DRG: 871 | End: 2020-01-01
Attending: FAMILY MEDICINE
Payer: MEDICARE

## 2020-01-01 ENCOUNTER — HOSPITAL ENCOUNTER (OUTPATIENT)
Dept: ULTRASOUND IMAGING | Age: 75
Discharge: HOME | End: 2020-05-01
Attending: NURSE PRACTITIONER
Payer: MEDICARE

## 2020-01-01 ENCOUNTER — ANESTHESIA EVENT (OUTPATIENT)
Dept: SURGERY | Age: 75
End: 2020-01-01
Payer: MEDICARE

## 2020-01-01 ENCOUNTER — ANESTHESIA (OUTPATIENT)
Dept: SURGERY | Age: 75
DRG: 219 | End: 2020-01-01
Payer: MEDICARE

## 2020-01-01 ENCOUNTER — APPOINTMENT (OUTPATIENT)
Dept: CT IMAGING | Age: 75
DRG: 871 | End: 2020-01-01
Attending: INTERNAL MEDICINE
Payer: MEDICARE

## 2020-01-01 ENCOUNTER — APPOINTMENT (OUTPATIENT)
Dept: ULTRASOUND IMAGING | Age: 75
End: 2020-01-01
Attending: INTERNAL MEDICINE
Payer: MEDICARE

## 2020-01-01 ENCOUNTER — HOME CARE VISIT (OUTPATIENT)
Dept: HOSPICE | Facility: HOSPICE | Age: 75
End: 2020-01-01
Payer: MEDICARE

## 2020-01-01 ENCOUNTER — APPOINTMENT (OUTPATIENT)
Dept: ULTRASOUND IMAGING | Age: 75
DRG: 219 | End: 2020-01-01
Attending: INTERNAL MEDICINE
Payer: MEDICARE

## 2020-01-01 ENCOUNTER — APPOINTMENT (OUTPATIENT)
Dept: GENERAL RADIOLOGY | Age: 75
DRG: 871 | End: 2020-01-01
Attending: FAMILY MEDICINE
Payer: MEDICARE

## 2020-01-01 ENCOUNTER — APPOINTMENT (OUTPATIENT)
Dept: INTERVENTIONAL RADIOLOGY/VASCULAR | Age: 75
DRG: 219 | End: 2020-01-01
Attending: INTERNAL MEDICINE
Payer: MEDICARE

## 2020-01-01 ENCOUNTER — ANESTHESIA EVENT (OUTPATIENT)
Dept: SURGERY | Age: 75
DRG: 871 | End: 2020-01-01
Payer: MEDICARE

## 2020-01-01 ENCOUNTER — ANESTHESIA (OUTPATIENT)
Dept: SURGERY | Age: 75
End: 2020-01-01
Payer: MEDICARE

## 2020-01-01 ENCOUNTER — APPOINTMENT (OUTPATIENT)
Dept: CT IMAGING | Age: 75
DRG: 871 | End: 2020-01-01
Attending: NURSE PRACTITIONER
Payer: MEDICARE

## 2020-01-01 ENCOUNTER — APPOINTMENT (OUTPATIENT)
Dept: ULTRASOUND IMAGING | Age: 75
DRG: 871 | End: 2020-01-01
Attending: INTERNAL MEDICINE
Payer: MEDICARE

## 2020-01-01 ENCOUNTER — HOSPITAL ENCOUNTER (INPATIENT)
Age: 75
LOS: 14 days | Discharge: HOSPICE | DRG: 871 | End: 2020-06-07
Attending: EMERGENCY MEDICINE | Admitting: INTERNAL MEDICINE
Payer: MEDICARE

## 2020-01-01 ENCOUNTER — HOSPITAL ENCOUNTER (OUTPATIENT)
Dept: SURGERY | Age: 75
Discharge: HOME | End: 2020-05-08
Payer: MEDICARE

## 2020-01-01 ENCOUNTER — ANESTHESIA EVENT (OUTPATIENT)
Dept: SURGERY | Age: 75
DRG: 219 | End: 2020-01-01
Payer: MEDICARE

## 2020-01-01 ENCOUNTER — ANESTHESIA (OUTPATIENT)
Dept: SURGERY | Age: 75
DRG: 871 | End: 2020-01-01
Payer: MEDICARE

## 2020-01-01 ENCOUNTER — HOSPITAL ENCOUNTER (OUTPATIENT)
Dept: GENERAL RADIOLOGY | Age: 75
Discharge: HOME | End: 2020-01-08
Attending: PHYSICIAN ASSISTANT
Payer: MEDICARE

## 2020-01-01 ENCOUNTER — HOSPITAL ENCOUNTER (OUTPATIENT)
Age: 75
Setting detail: OBSERVATION
Discharge: HOME HEALTH | End: 2020-05-16
Attending: SURGERY | Admitting: SURGERY
Payer: MEDICARE

## 2020-01-01 ENCOUNTER — APPOINTMENT (OUTPATIENT)
Dept: CT IMAGING | Age: 75
DRG: 219 | End: 2020-01-01
Attending: INTERNAL MEDICINE
Payer: MEDICARE

## 2020-01-01 VITALS
SYSTOLIC BLOOD PRESSURE: 146 MMHG | HEART RATE: 101 BPM | OXYGEN SATURATION: 92 % | RESPIRATION RATE: 20 BRPM | DIASTOLIC BLOOD PRESSURE: 82 MMHG | TEMPERATURE: 98.2 F

## 2020-01-01 VITALS
HEART RATE: 95 BPM | TEMPERATURE: 98.2 F | OXYGEN SATURATION: 84 % | HEIGHT: 70 IN | SYSTOLIC BLOOD PRESSURE: 160 MMHG | DIASTOLIC BLOOD PRESSURE: 72 MMHG | WEIGHT: 258 LBS | BODY MASS INDEX: 36.94 KG/M2 | RESPIRATION RATE: 24 BRPM

## 2020-01-01 VITALS
SYSTOLIC BLOOD PRESSURE: 138 MMHG | HEART RATE: 76 BPM | DIASTOLIC BLOOD PRESSURE: 76 MMHG | RESPIRATION RATE: 18 BRPM | TEMPERATURE: 98.3 F

## 2020-01-01 VITALS
DIASTOLIC BLOOD PRESSURE: 87 MMHG | OXYGEN SATURATION: 94 % | WEIGHT: 256 LBS | TEMPERATURE: 98.4 F | HEART RATE: 90 BPM | HEIGHT: 70 IN | BODY MASS INDEX: 36.65 KG/M2 | RESPIRATION RATE: 24 BRPM | SYSTOLIC BLOOD PRESSURE: 169 MMHG

## 2020-01-01 VITALS
BODY MASS INDEX: 36.51 KG/M2 | WEIGHT: 255 LBS | SYSTOLIC BLOOD PRESSURE: 131 MMHG | TEMPERATURE: 97.2 F | HEART RATE: 83 BPM | HEIGHT: 70 IN | DIASTOLIC BLOOD PRESSURE: 61 MMHG | OXYGEN SATURATION: 95 % | RESPIRATION RATE: 16 BRPM

## 2020-01-01 VITALS
TEMPERATURE: 98.1 F | DIASTOLIC BLOOD PRESSURE: 65 MMHG | SYSTOLIC BLOOD PRESSURE: 103 MMHG | HEIGHT: 70 IN | HEART RATE: 92 BPM | OXYGEN SATURATION: 93 % | WEIGHT: 234 LBS | RESPIRATION RATE: 19 BRPM | BODY MASS INDEX: 33.5 KG/M2

## 2020-01-01 VITALS — DIASTOLIC BLOOD PRESSURE: 80 MMHG | SYSTOLIC BLOOD PRESSURE: 140 MMHG | HEART RATE: 80 BPM

## 2020-01-01 VITALS
RESPIRATION RATE: 20 BRPM | WEIGHT: 248 LBS | HEIGHT: 70 IN | TEMPERATURE: 98.4 F | BODY MASS INDEX: 35.5 KG/M2 | OXYGEN SATURATION: 92 % | DIASTOLIC BLOOD PRESSURE: 59 MMHG | HEART RATE: 114 BPM | SYSTOLIC BLOOD PRESSURE: 109 MMHG

## 2020-01-01 VITALS
DIASTOLIC BLOOD PRESSURE: 63 MMHG | SYSTOLIC BLOOD PRESSURE: 140 MMHG | WEIGHT: 301.25 LBS | HEIGHT: 70 IN | BODY MASS INDEX: 43.13 KG/M2 | TEMPERATURE: 97.3 F | RESPIRATION RATE: 16 BRPM | HEART RATE: 88 BPM | OXYGEN SATURATION: 93 %

## 2020-01-01 VITALS
SYSTOLIC BLOOD PRESSURE: 132 MMHG | DIASTOLIC BLOOD PRESSURE: 78 MMHG | RESPIRATION RATE: 22 BRPM | HEART RATE: 82 BPM | TEMPERATURE: 99 F

## 2020-01-01 VITALS — SYSTOLIC BLOOD PRESSURE: 110 MMHG | DIASTOLIC BLOOD PRESSURE: 60 MMHG | HEART RATE: 88 BPM

## 2020-01-01 DIAGNOSIS — J18.9 PNEUMONIA OF LEFT LOWER LOBE DUE TO INFECTIOUS ORGANISM: ICD-10-CM

## 2020-01-01 DIAGNOSIS — I96 GANGRENOUS TOE (HCC): ICD-10-CM

## 2020-01-01 DIAGNOSIS — N17.9 ACUTE RENAL FAILURE ON DIALYSIS (HCC): ICD-10-CM

## 2020-01-01 DIAGNOSIS — R09.02 HYPOXEMIA: ICD-10-CM

## 2020-01-01 DIAGNOSIS — F41.9 ANXIETY: ICD-10-CM

## 2020-01-01 DIAGNOSIS — I96 GANGRENE OF TOE OF BOTH FEET (HCC): Primary | ICD-10-CM

## 2020-01-01 DIAGNOSIS — I48.91 ATRIAL FIBRILLATION, UNSPECIFIED TYPE (HCC): ICD-10-CM

## 2020-01-01 DIAGNOSIS — D75.829 HIT (HEPARIN-INDUCED THROMBOCYTOPENIA): ICD-10-CM

## 2020-01-01 DIAGNOSIS — R09.02 HYPOXIA: ICD-10-CM

## 2020-01-01 DIAGNOSIS — J96.21 ACUTE ON CHRONIC RESPIRATORY FAILURE WITH HYPOXIA (HCC): ICD-10-CM

## 2020-01-01 DIAGNOSIS — Z79.01 LONG TERM (CURRENT) USE OF ANTICOAGULANTS: Chronic | ICD-10-CM

## 2020-01-01 DIAGNOSIS — I96 GANGRENE OF TOE OF BOTH FEET (HCC): ICD-10-CM

## 2020-01-01 DIAGNOSIS — F32.89 OTHER DEPRESSION: ICD-10-CM

## 2020-01-01 DIAGNOSIS — N17.9 ACUTE RENAL FAILURE, UNSPECIFIED ACUTE RENAL FAILURE TYPE (HCC): ICD-10-CM

## 2020-01-01 DIAGNOSIS — E11.628 DIABETIC FOOT INFECTION (HCC): ICD-10-CM

## 2020-01-01 DIAGNOSIS — Z66 DNR (DO NOT RESUSCITATE): Chronic | ICD-10-CM

## 2020-01-01 DIAGNOSIS — J98.11 ATELECTASIS: ICD-10-CM

## 2020-01-01 DIAGNOSIS — E87.70 HYPERVOLEMIA, UNSPECIFIED HYPERVOLEMIA TYPE: ICD-10-CM

## 2020-01-01 DIAGNOSIS — Z95.1 S/P CABG X 3: ICD-10-CM

## 2020-01-01 DIAGNOSIS — E66.01 OBESITY, MORBID (HCC): Chronic | ICD-10-CM

## 2020-01-01 DIAGNOSIS — Z99.2 ACUTE RENAL FAILURE ON DIALYSIS (HCC): ICD-10-CM

## 2020-01-01 DIAGNOSIS — J90 PLEURAL EFFUSION: ICD-10-CM

## 2020-01-01 DIAGNOSIS — I25.10 CORONARY ARTERY DISEASE INVOLVING NATIVE CORONARY ARTERY OF NATIVE HEART WITHOUT ANGINA PECTORIS: ICD-10-CM

## 2020-01-01 DIAGNOSIS — J90 PLEURAL EFFUSION, LEFT: ICD-10-CM

## 2020-01-01 DIAGNOSIS — N17.9 SEPSIS WITH ACUTE RENAL FAILURE AND SEPTIC SHOCK, DUE TO UNSPECIFIED ORGANISM, UNSPECIFIED ACUTE RENAL FAILURE TYPE (HCC): Primary | ICD-10-CM

## 2020-01-01 DIAGNOSIS — J93.9 BILATERAL PNEUMOTHORAX: ICD-10-CM

## 2020-01-01 DIAGNOSIS — E87.20 METABOLIC ACIDOSIS: ICD-10-CM

## 2020-01-01 DIAGNOSIS — M79.671 ACUTE FOOT PAIN, RIGHT: ICD-10-CM

## 2020-01-01 DIAGNOSIS — Z99.11 WEANING FROM RESPIRATOR (HCC): ICD-10-CM

## 2020-01-01 DIAGNOSIS — R53.81 PHYSICAL DEBILITY: ICD-10-CM

## 2020-01-01 DIAGNOSIS — I50.9 ACUTE ON CHRONIC CONGESTIVE HEART FAILURE, UNSPECIFIED HEART FAILURE TYPE (HCC): Primary | ICD-10-CM

## 2020-01-01 DIAGNOSIS — Z95.2 S/P AVR: ICD-10-CM

## 2020-01-01 DIAGNOSIS — E87.79 OTHER HYPERVOLEMIA: ICD-10-CM

## 2020-01-01 DIAGNOSIS — R06.02 SHORTNESS OF BREATH: ICD-10-CM

## 2020-01-01 DIAGNOSIS — J81.1 PULMONARY EDEMA, NONCARDIAC: ICD-10-CM

## 2020-01-01 DIAGNOSIS — E66.01 OBESITY, MORBID (HCC): ICD-10-CM

## 2020-01-01 DIAGNOSIS — I35.0 AORTIC VALVE STENOSIS, ETIOLOGY OF CARDIAC VALVE DISEASE UNSPECIFIED: ICD-10-CM

## 2020-01-01 DIAGNOSIS — L08.9 DIABETIC FOOT INFECTION (HCC): ICD-10-CM

## 2020-01-01 DIAGNOSIS — I65.23 BILATERAL CAROTID ARTERY STENOSIS: ICD-10-CM

## 2020-01-01 DIAGNOSIS — Z51.5 ENCOUNTER FOR PALLIATIVE CARE: ICD-10-CM

## 2020-01-01 DIAGNOSIS — R57.9 SHOCK (HCC): ICD-10-CM

## 2020-01-01 DIAGNOSIS — Z79.4 TYPE 2 DIABETES MELLITUS WITHOUT COMPLICATION, WITH LONG-TERM CURRENT USE OF INSULIN (HCC): ICD-10-CM

## 2020-01-01 DIAGNOSIS — M86.9 OSTEOMYELITIS OF LEFT FOOT, UNSPECIFIED TYPE (HCC): ICD-10-CM

## 2020-01-01 DIAGNOSIS — I96 NECROSIS (HCC): ICD-10-CM

## 2020-01-01 DIAGNOSIS — J96.01 ACUTE HYPOXEMIC RESPIRATORY FAILURE (HCC): ICD-10-CM

## 2020-01-01 DIAGNOSIS — E11.9 TYPE 2 DIABETES MELLITUS WITHOUT COMPLICATION, WITH LONG-TERM CURRENT USE OF INSULIN (HCC): ICD-10-CM

## 2020-01-01 DIAGNOSIS — N18.6 END STAGE RENAL DISEASE (HCC): ICD-10-CM

## 2020-01-01 DIAGNOSIS — K11.7 SALIVARY SECRETION: ICD-10-CM

## 2020-01-01 DIAGNOSIS — D69.6 THROMBOCYTOPENIA (HCC): ICD-10-CM

## 2020-01-01 DIAGNOSIS — R60.9 EDEMA, UNSPECIFIED TYPE: ICD-10-CM

## 2020-01-01 DIAGNOSIS — Z71.89 COUNSELING REGARDING ADVANCE CARE PLANNING AND GOALS OF CARE: ICD-10-CM

## 2020-01-01 DIAGNOSIS — R65.21 SEPSIS WITH ACUTE RENAL FAILURE AND SEPTIC SHOCK, DUE TO UNSPECIFIED ORGANISM, UNSPECIFIED ACUTE RENAL FAILURE TYPE (HCC): Primary | ICD-10-CM

## 2020-01-01 DIAGNOSIS — J96.11 CHRONIC RESPIRATORY FAILURE WITH HYPOXIA (HCC): Chronic | ICD-10-CM

## 2020-01-01 DIAGNOSIS — A41.9 SEPSIS WITH ACUTE RENAL FAILURE AND SEPTIC SHOCK, DUE TO UNSPECIFIED ORGANISM, UNSPECIFIED ACUTE RENAL FAILURE TYPE (HCC): Primary | ICD-10-CM

## 2020-01-01 DIAGNOSIS — J18.9 HCAP (HEALTHCARE-ASSOCIATED PNEUMONIA): ICD-10-CM

## 2020-01-01 DIAGNOSIS — N17.9 AKI (ACUTE KIDNEY INJURY) (HCC): ICD-10-CM

## 2020-01-01 DIAGNOSIS — R52 PAIN: ICD-10-CM

## 2020-01-01 DIAGNOSIS — M86.9 OSTEOMYELITIS OF RIGHT FOOT, UNSPECIFIED TYPE (HCC): ICD-10-CM

## 2020-01-01 DIAGNOSIS — I48.0 PAROXYSMAL ATRIAL FIBRILLATION (HCC): Chronic | ICD-10-CM

## 2020-01-01 DIAGNOSIS — I35.0 NONRHEUMATIC AORTIC VALVE STENOSIS: ICD-10-CM

## 2020-01-01 DIAGNOSIS — D75.829 HIT (HEPARIN-INDUCED THROMBOCYTOPENIA): Chronic | ICD-10-CM

## 2020-01-01 LAB
ABO + RH BLD: NORMAL
ACC. NO. FROM MICRO ORDER, ACCP: ABNORMAL
ACC. NO. FROM MICRO ORDER, ACCP: ABNORMAL
ALBUMIN SERPL-MCNC: 1.5 G/DL (ref 3.2–4.6)
ALBUMIN SERPL-MCNC: 1.7 G/DL (ref 3.2–4.6)
ALBUMIN SERPL-MCNC: 1.8 G/DL (ref 3.2–4.6)
ALBUMIN SERPL-MCNC: 1.9 G/DL (ref 3.2–4.6)
ALBUMIN SERPL-MCNC: 2 G/DL (ref 3.2–4.6)
ALBUMIN SERPL-MCNC: 2.1 G/DL (ref 3.2–4.6)
ALBUMIN SERPL-MCNC: 2.2 G/DL (ref 3.2–4.6)
ALBUMIN SERPL-MCNC: 2.3 G/DL (ref 3.2–4.6)
ALBUMIN SERPL-MCNC: 2.3 G/DL (ref 3.2–4.6)
ALBUMIN SERPL-MCNC: 2.4 G/DL (ref 3.2–4.6)
ALBUMIN SERPL-MCNC: 2.5 G/DL (ref 3.2–4.6)
ALBUMIN SERPL-MCNC: 2.5 G/DL (ref 3.2–4.6)
ALBUMIN SERPL-MCNC: 2.9 G/DL (ref 3.2–4.6)
ALBUMIN SERPL-MCNC: 3.1 G/DL (ref 3.2–4.6)
ALBUMIN SERPL-MCNC: 3.3 G/DL (ref 3.2–4.6)
ALBUMIN/GLOB SERPL: 0.3 {RATIO} (ref 1.2–3.5)
ALBUMIN/GLOB SERPL: 0.4 {RATIO} (ref 1.2–3.5)
ALBUMIN/GLOB SERPL: 0.5 {RATIO} (ref 1.2–3.5)
ALBUMIN/GLOB SERPL: 0.6 {RATIO} (ref 1.2–3.5)
ALBUMIN/GLOB SERPL: 0.6 {RATIO} (ref 1.2–3.5)
ALBUMIN/GLOB SERPL: 0.8 {RATIO} (ref 1.2–3.5)
ALBUMIN/GLOB SERPL: 1.1 {RATIO} (ref 1.2–3.5)
ALP SERPL-CCNC: 102 U/L (ref 50–136)
ALP SERPL-CCNC: 105 U/L (ref 50–136)
ALP SERPL-CCNC: 108 U/L (ref 50–136)
ALP SERPL-CCNC: 113 U/L (ref 50–136)
ALP SERPL-CCNC: 122 U/L (ref 50–136)
ALP SERPL-CCNC: 123 U/L (ref 50–136)
ALP SERPL-CCNC: 124 U/L (ref 50–136)
ALP SERPL-CCNC: 125 U/L (ref 50–136)
ALP SERPL-CCNC: 129 U/L (ref 50–136)
ALP SERPL-CCNC: 130 U/L (ref 50–136)
ALP SERPL-CCNC: 130 U/L (ref 50–136)
ALP SERPL-CCNC: 132 U/L (ref 50–136)
ALP SERPL-CCNC: 133 U/L (ref 50–136)
ALP SERPL-CCNC: 134 U/L (ref 50–136)
ALP SERPL-CCNC: 136 U/L (ref 50–136)
ALP SERPL-CCNC: 139 U/L (ref 50–136)
ALP SERPL-CCNC: 139 U/L (ref 50–136)
ALP SERPL-CCNC: 140 U/L (ref 50–136)
ALP SERPL-CCNC: 141 U/L (ref 50–136)
ALP SERPL-CCNC: 142 U/L (ref 50–136)
ALP SERPL-CCNC: 51 U/L (ref 50–136)
ALP SERPL-CCNC: 68 U/L (ref 50–136)
ALP SERPL-CCNC: 91 U/L (ref 50–136)
ALP SERPL-CCNC: 95 U/L (ref 50–136)
ALP SERPL-CCNC: 99 U/L (ref 50–136)
ALT SERPL-CCNC: 10 U/L (ref 12–65)
ALT SERPL-CCNC: 120 U/L (ref 12–65)
ALT SERPL-CCNC: 18 U/L (ref 12–65)
ALT SERPL-CCNC: 20 U/L (ref 12–65)
ALT SERPL-CCNC: 20 U/L (ref 12–65)
ALT SERPL-CCNC: 23 U/L (ref 12–65)
ALT SERPL-CCNC: 23 U/L (ref 12–65)
ALT SERPL-CCNC: 24 U/L (ref 12–65)
ALT SERPL-CCNC: 25 U/L (ref 12–65)
ALT SERPL-CCNC: 26 U/L (ref 12–65)
ALT SERPL-CCNC: 28 U/L (ref 12–65)
ALT SERPL-CCNC: 29 U/L (ref 12–65)
ALT SERPL-CCNC: 29 U/L (ref 12–65)
ALT SERPL-CCNC: 30 U/L (ref 12–65)
ALT SERPL-CCNC: 30 U/L (ref 12–65)
ALT SERPL-CCNC: 31 U/L (ref 12–65)
ALT SERPL-CCNC: 32 U/L (ref 12–65)
ALT SERPL-CCNC: 33 U/L (ref 12–65)
ALT SERPL-CCNC: 36 U/L (ref 12–65)
ALT SERPL-CCNC: 37 U/L (ref 12–65)
ALT SERPL-CCNC: 37 U/L (ref 12–65)
ALT SERPL-CCNC: 45 U/L (ref 12–65)
ALT SERPL-CCNC: 55 U/L (ref 12–65)
AMORPH CRY URNS QL MICRO: ABNORMAL
ANION GAP SERPL CALC-SCNC: 10 MMOL/L (ref 7–16)
ANION GAP SERPL CALC-SCNC: 11 MMOL/L (ref 7–16)
ANION GAP SERPL CALC-SCNC: 11 MMOL/L (ref 7–16)
ANION GAP SERPL CALC-SCNC: 12 MMOL/L (ref 7–16)
ANION GAP SERPL CALC-SCNC: 3 MMOL/L (ref 7–16)
ANION GAP SERPL CALC-SCNC: 4 MMOL/L (ref 7–16)
ANION GAP SERPL CALC-SCNC: 5 MMOL/L (ref 7–16)
ANION GAP SERPL CALC-SCNC: 6 MMOL/L (ref 7–16)
ANION GAP SERPL CALC-SCNC: 7 MMOL/L (ref 7–16)
ANION GAP SERPL CALC-SCNC: 8 MMOL/L (ref 7–16)
ANION GAP SERPL CALC-SCNC: 9 MMOL/L (ref 7–16)
APPEARANCE UR: ABNORMAL
APPEARANCE UR: ABNORMAL
APPEARANCE UR: CLEAR
APPEARANCE UR: CLEAR
APTT PPP: 102.9 SEC (ref 24.7–39.8)
APTT PPP: 25.9 SEC (ref 24.7–39.8)
APTT PPP: 29.5 SEC (ref 24.3–35.4)
APTT PPP: 31.1 SEC (ref 24.3–35.4)
APTT PPP: 32.9 SEC (ref 24.7–39.8)
APTT PPP: 33.5 SEC (ref 24.7–39.8)
APTT PPP: 34.2 SEC (ref 24.7–39.8)
APTT PPP: 37.2 SEC (ref 24.3–35.4)
APTT PPP: 37.7 SEC (ref 24.7–39.8)
APTT PPP: 39 SEC (ref 24.7–39.8)
APTT PPP: 39.4 SEC (ref 24.7–39.8)
APTT PPP: 39.6 SEC (ref 24.7–39.8)
APTT PPP: 40.5 SEC (ref 24.7–39.8)
APTT PPP: 40.6 SEC (ref 24.3–35.4)
APTT PPP: 41.2 SEC (ref 24.7–39.8)
APTT PPP: 43.1 SEC (ref 24.7–39.8)
APTT PPP: 43.2 SEC (ref 24.7–39.8)
APTT PPP: 45.5 SEC (ref 24.3–35.4)
APTT PPP: 46.3 SEC (ref 24.7–39.8)
APTT PPP: 46.5 SEC (ref 24.7–39.8)
APTT PPP: 47.7 SEC (ref 24.3–35.4)
APTT PPP: 48 SEC (ref 24.7–39.8)
APTT PPP: 48.1 SEC (ref 24.3–35.4)
APTT PPP: 48.3 SEC (ref 24.7–39.8)
APTT PPP: 49.6 SEC (ref 24.7–39.8)
APTT PPP: 50.2 SEC (ref 24.3–35.4)
APTT PPP: 50.2 SEC (ref 24.7–39.8)
APTT PPP: 50.4 SEC (ref 24.7–39.8)
APTT PPP: 50.6 SEC (ref 24.7–39.8)
APTT PPP: 51.2 SEC (ref 24.7–39.8)
APTT PPP: 51.6 SEC (ref 24.3–35.4)
APTT PPP: 52.7 SEC (ref 24.7–39.8)
APTT PPP: 54.3 SEC (ref 24.7–39.8)
APTT PPP: 54.5 SEC (ref 24.3–35.4)
APTT PPP: 54.7 SEC (ref 24.7–39.8)
APTT PPP: 57.1 SEC (ref 24.7–39.8)
APTT PPP: 57.5 SEC (ref 24.7–39.8)
APTT PPP: 58.2 SEC (ref 24.7–39.8)
APTT PPP: 58.4 SEC (ref 24.7–39.8)
APTT PPP: 58.5 SEC (ref 24.3–35.4)
APTT PPP: 58.7 SEC (ref 24.7–39.8)
APTT PPP: 58.7 SEC (ref 24.7–39.8)
APTT PPP: 59.8 SEC (ref 24.3–35.4)
APTT PPP: 60 SEC (ref 24.3–35.4)
APTT PPP: 61.1 SEC (ref 24.3–35.4)
APTT PPP: 61.3 SEC (ref 24.7–39.8)
APTT PPP: 61.5 SEC (ref 24.3–35.4)
APTT PPP: 62 SEC (ref 24.7–39.8)
APTT PPP: 62.7 SEC (ref 24.7–39.8)
APTT PPP: 63.2 SEC (ref 24.3–35.4)
APTT PPP: 63.2 SEC (ref 24.7–39.8)
APTT PPP: 63.7 SEC (ref 24.3–35.4)
APTT PPP: 64.4 SEC (ref 24.3–35.4)
APTT PPP: 65.4 SEC (ref 24.7–39.8)
APTT PPP: 65.7 SEC (ref 24.3–35.4)
APTT PPP: 66.3 SEC (ref 24.7–39.8)
APTT PPP: 67.2 SEC (ref 24.7–39.8)
APTT PPP: 67.9 SEC (ref 24.7–39.8)
APTT PPP: 68.2 SEC (ref 24.7–39.8)
APTT PPP: 68.7 SEC (ref 24.7–39.8)
APTT PPP: 76.4 SEC (ref 24.3–35.4)
APTT PPP: 77.1 SEC (ref 24.7–39.8)
APTT PPP: 80.5 SEC (ref 24.7–39.8)
APTT PPP: 84 SEC (ref 24.7–39.8)
APTT PPP: 90.3 SEC (ref 24.7–39.8)
APTT PPP: 96.2 SEC (ref 24.7–39.8)
ARTERIAL PATENCY WRIST A: ABNORMAL
ARTERIAL PATENCY WRIST A: POSITIVE
ARTERIAL PATENCY WRIST A: YES
AST SERPL-CCNC: 14 U/L (ref 15–37)
AST SERPL-CCNC: 166 U/L (ref 15–37)
AST SERPL-CCNC: 18 U/L (ref 15–37)
AST SERPL-CCNC: 19 U/L (ref 15–37)
AST SERPL-CCNC: 20 U/L (ref 15–37)
AST SERPL-CCNC: 24 U/L (ref 15–37)
AST SERPL-CCNC: 24 U/L (ref 15–37)
AST SERPL-CCNC: 25 U/L (ref 15–37)
AST SERPL-CCNC: 28 U/L (ref 15–37)
AST SERPL-CCNC: 28 U/L (ref 15–37)
AST SERPL-CCNC: 29 U/L (ref 15–37)
AST SERPL-CCNC: 30 U/L (ref 15–37)
AST SERPL-CCNC: 31 U/L (ref 15–37)
AST SERPL-CCNC: 32 U/L (ref 15–37)
AST SERPL-CCNC: 33 U/L (ref 15–37)
AST SERPL-CCNC: 33 U/L (ref 15–37)
AST SERPL-CCNC: 34 U/L (ref 15–37)
AST SERPL-CCNC: 35 U/L (ref 15–37)
AST SERPL-CCNC: 38 U/L (ref 15–37)
AST SERPL-CCNC: 39 U/L (ref 15–37)
AST SERPL-CCNC: 43 U/L (ref 15–37)
AST SERPL-CCNC: 55 U/L (ref 15–37)
AST SERPL-CCNC: 64 U/L (ref 15–37)
ATRIAL RATE: 102 BPM
ATRIAL RATE: 115 BPM
ATRIAL RATE: 70 BPM
ATRIAL RATE: 75 BPM
ATRIAL RATE: 76 BPM
ATRIAL RATE: 78 BPM
ATRIAL RATE: 81 BPM
ATRIAL RATE: 91 BPM
BACTERIA SPEC CULT: ABNORMAL
BACTERIA SPEC CULT: NORMAL
BACTERIA URNS QL MICRO: 0 /HPF
BACTERIA URNS QL MICRO: ABNORMAL /HPF
BACTERIA URNS QL MICRO: ABNORMAL /HPF
BASE DEFICIT BLD-SCNC: 10 MMOL/L
BASE DEFICIT BLD-SCNC: 2 MMOL/L
BASE DEFICIT BLD-SCNC: 3 MMOL/L
BASE DEFICIT BLD-SCNC: 4 MMOL/L
BASE DEFICIT BLD-SCNC: 5 MMOL/L
BASE DEFICIT BLD-SCNC: 6 MMOL/L
BASE DEFICIT BLD-SCNC: 6 MMOL/L
BASE DEFICIT BLD-SCNC: 8 MMOL/L
BASE DEFICIT BLDV-SCNC: 5 MMOL/L
BASE DEFICIT BLDV-SCNC: 6 MMOL/L
BASE EXCESS BLD CALC-SCNC: 0 MMOL/L
BASE EXCESS BLD CALC-SCNC: 1 MMOL/L
BASE EXCESS BLD CALC-SCNC: 10 MMOL/L
BASE EXCESS BLD CALC-SCNC: 2 MMOL/L
BASE EXCESS BLD CALC-SCNC: 3 MMOL/L
BASE EXCESS BLD CALC-SCNC: 3 MMOL/L
BASE EXCESS BLD CALC-SCNC: 4 MMOL/L
BASE EXCESS BLD CALC-SCNC: 5 MMOL/L
BASE EXCESS BLD CALC-SCNC: 6 MMOL/L
BASE EXCESS BLD CALC-SCNC: 6 MMOL/L
BASE EXCESS BLDV CALC-SCNC: 1 MMOL/L
BASE EXCESS BLDV CALC-SCNC: 2 MMOL/L
BASE EXCESS BLDV CALC-SCNC: 3 MMOL/L
BASE EXCESS BLDV CALC-SCNC: 3 MMOL/L
BASE EXCESS BLDV CALC-SCNC: 4 MMOL/L
BASE EXCESS BLDV CALC-SCNC: 4 MMOL/L
BASOPHILS # BLD: 0 K/UL (ref 0–0.2)
BASOPHILS # BLD: 0.1 K/UL (ref 0–0.2)
BASOPHILS NFR BLD: 0 % (ref 0–2)
BASOPHILS NFR BLD: 1 % (ref 0–2)
BDY SITE: ABNORMAL
BILIRUB DIRECT SERPL-MCNC: 0.3 MG/DL
BILIRUB DIRECT SERPL-MCNC: 0.3 MG/DL
BILIRUB DIRECT SERPL-MCNC: <0.1 MG/DL
BILIRUB INDIRECT SERPL-MCNC: 0.3 MG/DL (ref 0–1.1)
BILIRUB SERPL-MCNC: 0.2 MG/DL (ref 0.2–1.1)
BILIRUB SERPL-MCNC: 0.3 MG/DL (ref 0.2–1.1)
BILIRUB SERPL-MCNC: 0.4 MG/DL (ref 0.2–1.1)
BILIRUB SERPL-MCNC: 0.5 MG/DL (ref 0.2–1.1)
BILIRUB SERPL-MCNC: 0.6 MG/DL (ref 0.2–1.1)
BILIRUB SERPL-MCNC: 0.7 MG/DL (ref 0.2–1.1)
BILIRUB SERPL-MCNC: 0.7 MG/DL (ref 0.2–1.1)
BILIRUB SERPL-MCNC: 0.8 MG/DL (ref 0.2–1.1)
BILIRUB UR QL: ABNORMAL
BILIRUB UR QL: NEGATIVE
BLD PROD TYP BPU: NORMAL
BLOOD GROUP ANTIBODIES SERPL: NORMAL
BNP SERPL-MCNC: 1222 PG/ML (ref 5–125)
BNP SERPL-MCNC: 1310 PG/ML (ref 5–125)
BNP SERPL-MCNC: 2154 PG/ML (ref 5–125)
BNP SERPL-MCNC: 2698 PG/ML (ref 5–125)
BNP SERPL-MCNC: 4134 PG/ML (ref 5–125)
BODY TEMPERATURE: 98.6
BPU ID: NORMAL
BRONCH. LAVAGE DIFF.,BR: NORMAL
BUN SERPL-MCNC: 11 MG/DL (ref 8–23)
BUN SERPL-MCNC: 13 MG/DL (ref 8–23)
BUN SERPL-MCNC: 14 MG/DL (ref 8–23)
BUN SERPL-MCNC: 15 MG/DL (ref 8–23)
BUN SERPL-MCNC: 15 MG/DL (ref 8–23)
BUN SERPL-MCNC: 17 MG/DL (ref 8–23)
BUN SERPL-MCNC: 18 MG/DL (ref 8–23)
BUN SERPL-MCNC: 19 MG/DL (ref 8–23)
BUN SERPL-MCNC: 20 MG/DL (ref 8–23)
BUN SERPL-MCNC: 21 MG/DL (ref 8–23)
BUN SERPL-MCNC: 21 MG/DL (ref 8–23)
BUN SERPL-MCNC: 22 MG/DL (ref 8–23)
BUN SERPL-MCNC: 22 MG/DL (ref 8–23)
BUN SERPL-MCNC: 23 MG/DL (ref 8–23)
BUN SERPL-MCNC: 24 MG/DL (ref 8–23)
BUN SERPL-MCNC: 24 MG/DL (ref 8–23)
BUN SERPL-MCNC: 25 MG/DL (ref 8–23)
BUN SERPL-MCNC: 25 MG/DL (ref 8–23)
BUN SERPL-MCNC: 26 MG/DL (ref 8–23)
BUN SERPL-MCNC: 28 MG/DL (ref 8–23)
BUN SERPL-MCNC: 29 MG/DL (ref 8–23)
BUN SERPL-MCNC: 30 MG/DL (ref 8–23)
BUN SERPL-MCNC: 32 MG/DL (ref 8–23)
BUN SERPL-MCNC: 32 MG/DL (ref 8–23)
BUN SERPL-MCNC: 33 MG/DL (ref 8–23)
BUN SERPL-MCNC: 34 MG/DL (ref 8–23)
BUN SERPL-MCNC: 35 MG/DL (ref 8–23)
BUN SERPL-MCNC: 36 MG/DL (ref 8–23)
BUN SERPL-MCNC: 37 MG/DL (ref 8–23)
BUN SERPL-MCNC: 37 MG/DL (ref 8–23)
BUN SERPL-MCNC: 38 MG/DL (ref 8–23)
BUN SERPL-MCNC: 38 MG/DL (ref 8–23)
BUN SERPL-MCNC: 39 MG/DL (ref 8–23)
BUN SERPL-MCNC: 40 MG/DL (ref 8–23)
BUN SERPL-MCNC: 41 MG/DL (ref 8–23)
BUN SERPL-MCNC: 41 MG/DL (ref 8–23)
BUN SERPL-MCNC: 42 MG/DL (ref 8–23)
BUN SERPL-MCNC: 42 MG/DL (ref 8–23)
BUN SERPL-MCNC: 43 MG/DL (ref 8–23)
BUN SERPL-MCNC: 43 MG/DL (ref 8–23)
BUN SERPL-MCNC: 44 MG/DL (ref 8–23)
BUN SERPL-MCNC: 44 MG/DL (ref 8–23)
BUN SERPL-MCNC: 45 MG/DL (ref 8–23)
BUN SERPL-MCNC: 46 MG/DL (ref 8–23)
BUN SERPL-MCNC: 47 MG/DL (ref 8–23)
BUN SERPL-MCNC: 48 MG/DL (ref 8–23)
BUN SERPL-MCNC: 49 MG/DL (ref 8–23)
BUN SERPL-MCNC: 50 MG/DL (ref 8–23)
BUN SERPL-MCNC: 51 MG/DL (ref 8–23)
BUN SERPL-MCNC: 52 MG/DL (ref 8–23)
BUN SERPL-MCNC: 52 MG/DL (ref 8–23)
BUN SERPL-MCNC: 54 MG/DL (ref 8–23)
BUN SERPL-MCNC: 55 MG/DL (ref 8–23)
BUN SERPL-MCNC: 55 MG/DL (ref 8–23)
BUN SERPL-MCNC: 57 MG/DL (ref 8–23)
BUN SERPL-MCNC: 58 MG/DL (ref 8–23)
BUN SERPL-MCNC: 6 MG/DL (ref 8–23)
BUN SERPL-MCNC: 60 MG/DL (ref 8–23)
BUN SERPL-MCNC: 61 MG/DL (ref 8–23)
BUN SERPL-MCNC: 66 MG/DL (ref 8–23)
BUN SERPL-MCNC: 68 MG/DL (ref 8–23)
BUN SERPL-MCNC: 8 MG/DL (ref 8–23)
CA-I BLD-MCNC: 1.07 MMOL/L (ref 1.12–1.32)
CA-I BLD-MCNC: 1.08 MMOL/L (ref 1.12–1.32)
CA-I BLD-MCNC: 1.11 MMOL/L (ref 1.12–1.32)
CA-I BLD-MCNC: 1.16 MMOL/L (ref 1.12–1.32)
CA-I BLD-MCNC: 1.17 MMOL/L (ref 1.12–1.32)
CA-I BLD-MCNC: 1.19 MMOL/L (ref 1.12–1.32)
CA-I BLD-MCNC: 1.22 MMOL/L (ref 1.12–1.32)
CA-I BLD-MCNC: 1.22 MMOL/L (ref 1.12–1.32)
CA-I BLD-MCNC: 1.3 MMOL/L (ref 1.12–1.32)
CA-I BLD-MCNC: 1.4 MMOL/L (ref 1.12–1.32)
CALCIUM SERPL-MCNC: 7.5 MG/DL (ref 8.3–10.4)
CALCIUM SERPL-MCNC: 7.6 MG/DL (ref 8.3–10.4)
CALCIUM SERPL-MCNC: 7.7 MG/DL (ref 8.3–10.4)
CALCIUM SERPL-MCNC: 7.8 MG/DL (ref 8.3–10.4)
CALCIUM SERPL-MCNC: 7.9 MG/DL (ref 8.3–10.4)
CALCIUM SERPL-MCNC: 8 MG/DL (ref 8.3–10.4)
CALCIUM SERPL-MCNC: 8.1 MG/DL (ref 8.3–10.4)
CALCIUM SERPL-MCNC: 8.2 MG/DL (ref 8.3–10.4)
CALCIUM SERPL-MCNC: 8.3 MG/DL (ref 8.3–10.4)
CALCIUM SERPL-MCNC: 8.4 MG/DL (ref 8.3–10.4)
CALCIUM SERPL-MCNC: 8.5 MG/DL (ref 8.3–10.4)
CALCIUM SERPL-MCNC: 8.6 MG/DL (ref 8.3–10.4)
CALCIUM SERPL-MCNC: 8.7 MG/DL (ref 8.3–10.4)
CALCIUM SERPL-MCNC: 8.8 MG/DL (ref 8.3–10.4)
CALCIUM SERPL-MCNC: 9.1 MG/DL (ref 8.3–10.4)
CALCIUM SERPL-MCNC: 9.2 MG/DL (ref 8.3–10.4)
CALCULATED P AXIS, ECG09: -2 DEGREES
CALCULATED P AXIS, ECG09: 2 DEGREES
CALCULATED P AXIS, ECG09: 24 DEGREES
CALCULATED P AXIS, ECG09: 28 DEGREES
CALCULATED P AXIS, ECG09: 4 DEGREES
CALCULATED P AXIS, ECG09: 53 DEGREES
CALCULATED R AXIS, ECG10: -12 DEGREES
CALCULATED R AXIS, ECG10: -6 DEGREES
CALCULATED R AXIS, ECG10: -7 DEGREES
CALCULATED R AXIS, ECG10: 2 DEGREES
CALCULATED R AXIS, ECG10: 25 DEGREES
CALCULATED R AXIS, ECG10: 43 DEGREES
CALCULATED R AXIS, ECG10: 6 DEGREES
CALCULATED R AXIS, ECG10: 65 DEGREES
CALCULATED T AXIS, ECG11: -137 DEGREES
CALCULATED T AXIS, ECG11: 101 DEGREES
CALCULATED T AXIS, ECG11: 56 DEGREES
CALCULATED T AXIS, ECG11: 88 DEGREES
CALCULATED T AXIS, ECG11: 9 DEGREES
CALCULATED T AXIS, ECG11: 90 DEGREES
CALCULATED T AXIS, ECG11: 94 DEGREES
CALCULATED T AXIS, ECG11: 96 DEGREES
CANDIDA ALBICANS: DETECTED
CASTS URNS QL MICRO: 0 /LPF
CHLORIDE SERPL-SCNC: 100 MMOL/L (ref 98–107)
CHLORIDE SERPL-SCNC: 101 MMOL/L (ref 98–107)
CHLORIDE SERPL-SCNC: 102 MMOL/L (ref 98–107)
CHLORIDE SERPL-SCNC: 103 MMOL/L (ref 98–107)
CHLORIDE SERPL-SCNC: 104 MMOL/L (ref 98–107)
CHLORIDE SERPL-SCNC: 105 MMOL/L (ref 98–107)
CHLORIDE SERPL-SCNC: 106 MMOL/L (ref 98–107)
CHLORIDE SERPL-SCNC: 107 MMOL/L (ref 98–107)
CHLORIDE SERPL-SCNC: 108 MMOL/L (ref 98–107)
CHLORIDE SERPL-SCNC: 110 MMOL/L (ref 98–107)
CHLORIDE SERPL-SCNC: 111 MMOL/L (ref 98–107)
CHLORIDE SERPL-SCNC: 112 MMOL/L (ref 98–107)
CHLORIDE SERPL-SCNC: 113 MMOL/L (ref 98–107)
CHLORIDE SERPL-SCNC: 113 MMOL/L (ref 98–107)
CHLORIDE SERPL-SCNC: 114 MMOL/L (ref 98–107)
CHLORIDE SERPL-SCNC: 114 MMOL/L (ref 98–107)
CHLORIDE SERPL-SCNC: 95 MMOL/L (ref 98–107)
CHLORIDE SERPL-SCNC: 95 MMOL/L (ref 98–107)
CHLORIDE SERPL-SCNC: 96 MMOL/L (ref 98–107)
CHLORIDE SERPL-SCNC: 97 MMOL/L (ref 98–107)
CHLORIDE SERPL-SCNC: 98 MMOL/L (ref 98–107)
CHLORIDE SERPL-SCNC: 99 MMOL/L (ref 98–107)
CK MB CFR SERPL CALC: 1.8 %
CK MB SERPL-MCNC: 1.5 NG/ML (ref 0.5–3.6)
CK SERPL-CCNC: 83 U/L (ref 21–215)
CO2 BLD-SCNC: 22 MMOL/L
CO2 BLD-SCNC: 22 MMOL/L
CO2 BLD-SCNC: 23 MMOL/L
CO2 BLD-SCNC: 23 MMOL/L
CO2 BLD-SCNC: 24 MMOL/L
CO2 BLD-SCNC: 27 MMOL/L
CO2 BLD-SCNC: 27 MMOL/L
CO2 BLD-SCNC: 28 MMOL/L
CO2 BLD-SCNC: 29 MMOL/L
CO2 BLD-SCNC: 30 MMOL/L
CO2 BLD-SCNC: 31 MMOL/L
CO2 BLD-SCNC: 32 MMOL/L
CO2 BLD-SCNC: 33 MMOL/L
CO2 BLD-SCNC: 34 MMOL/L
CO2 SERPL-SCNC: 22 MMOL/L (ref 21–32)
CO2 SERPL-SCNC: 22 MMOL/L (ref 21–32)
CO2 SERPL-SCNC: 23 MMOL/L (ref 21–32)
CO2 SERPL-SCNC: 23 MMOL/L (ref 21–32)
CO2 SERPL-SCNC: 24 MMOL/L (ref 21–32)
CO2 SERPL-SCNC: 25 MMOL/L (ref 21–32)
CO2 SERPL-SCNC: 26 MMOL/L (ref 21–32)
CO2 SERPL-SCNC: 27 MMOL/L (ref 21–32)
CO2 SERPL-SCNC: 28 MMOL/L (ref 21–32)
CO2 SERPL-SCNC: 29 MMOL/L (ref 21–32)
CO2 SERPL-SCNC: 30 MMOL/L (ref 21–32)
CO2 SERPL-SCNC: 31 MMOL/L (ref 21–32)
CO2 SERPL-SCNC: 32 MMOL/L (ref 21–32)
CO2 SERPL-SCNC: 33 MMOL/L (ref 21–32)
CO2 SERPL-SCNC: 34 MMOL/L (ref 21–32)
CO2 SERPL-SCNC: 35 MMOL/L (ref 21–32)
CO2 SERPL-SCNC: 35 MMOL/L (ref 21–32)
COLLECT TIME,HTIME: 107
COLLECT TIME,HTIME: 1423
COLLECT TIME,HTIME: 155
COLLECT TIME,HTIME: 1615
COLLECT TIME,HTIME: 1709
COLLECT TIME,HTIME: 1801
COLLECT TIME,HTIME: 1819
COLLECT TIME,HTIME: 1925
COLLECT TIME,HTIME: 2020
COLLECT TIME,HTIME: 2030
COLLECT TIME,HTIME: 2035
COLLECT TIME,HTIME: 2200
COLLECT TIME,HTIME: 2335
COLLECT TIME,HTIME: 2343
COLLECT TIME,HTIME: 250
COLLECT TIME,HTIME: 255
COLLECT TIME,HTIME: 30
COLLECT TIME,HTIME: 300
COLLECT TIME,HTIME: 305
COLLECT TIME,HTIME: 305
COLLECT TIME,HTIME: 312
COLLECT TIME,HTIME: 325
COLLECT TIME,HTIME: 330
COLLECT TIME,HTIME: 335
COLLECT TIME,HTIME: 350
COLLECT TIME,HTIME: 355
COLLECT TIME,HTIME: 400
COLLECT TIME,HTIME: 408
COLLECT TIME,HTIME: 428
COLLECT TIME,HTIME: 440
COLLECT TIME,HTIME: 455
COLLECT TIME,HTIME: 458
COLLECT TIME,HTIME: 520
COLLECT TIME,HTIME: 55
COLLECT TIME,HTIME: 800
COLOR UR: ABNORMAL
COLOR UR: YELLOW
CORTIS BS SERPL-MCNC: 18.6 UG/DL
CORTIS BS SERPL-MCNC: 27.6 UG/DL
CREAT SERPL-MCNC: 0.91 MG/DL (ref 0.8–1.5)
CREAT SERPL-MCNC: 1.08 MG/DL (ref 0.8–1.5)
CREAT SERPL-MCNC: 1.35 MG/DL (ref 0.8–1.5)
CREAT SERPL-MCNC: 1.42 MG/DL (ref 0.8–1.5)
CREAT SERPL-MCNC: 1.44 MG/DL (ref 0.8–1.5)
CREAT SERPL-MCNC: 1.48 MG/DL (ref 0.8–1.5)
CREAT SERPL-MCNC: 1.51 MG/DL (ref 0.8–1.5)
CREAT SERPL-MCNC: 1.53 MG/DL (ref 0.8–1.5)
CREAT SERPL-MCNC: 1.55 MG/DL (ref 0.8–1.5)
CREAT SERPL-MCNC: 1.55 MG/DL (ref 0.8–1.5)
CREAT SERPL-MCNC: 1.61 MG/DL (ref 0.8–1.5)
CREAT SERPL-MCNC: 1.63 MG/DL (ref 0.8–1.5)
CREAT SERPL-MCNC: 1.67 MG/DL (ref 0.8–1.5)
CREAT SERPL-MCNC: 1.72 MG/DL (ref 0.8–1.5)
CREAT SERPL-MCNC: 1.73 MG/DL (ref 0.8–1.5)
CREAT SERPL-MCNC: 1.77 MG/DL (ref 0.8–1.5)
CREAT SERPL-MCNC: 1.78 MG/DL (ref 0.8–1.5)
CREAT SERPL-MCNC: 1.79 MG/DL (ref 0.8–1.5)
CREAT SERPL-MCNC: 1.8 MG/DL (ref 0.8–1.5)
CREAT SERPL-MCNC: 1.88 MG/DL (ref 0.8–1.5)
CREAT SERPL-MCNC: 1.9 MG/DL (ref 0.8–1.5)
CREAT SERPL-MCNC: 1.94 MG/DL (ref 0.8–1.5)
CREAT SERPL-MCNC: 1.98 MG/DL (ref 0.8–1.5)
CREAT SERPL-MCNC: 2 MG/DL (ref 0.8–1.5)
CREAT SERPL-MCNC: 2.06 MG/DL (ref 0.8–1.5)
CREAT SERPL-MCNC: 2.08 MG/DL (ref 0.8–1.5)
CREAT SERPL-MCNC: 2.16 MG/DL (ref 0.8–1.5)
CREAT SERPL-MCNC: 2.16 MG/DL (ref 0.8–1.5)
CREAT SERPL-MCNC: 2.17 MG/DL (ref 0.8–1.5)
CREAT SERPL-MCNC: 2.19 MG/DL (ref 0.8–1.5)
CREAT SERPL-MCNC: 2.19 MG/DL (ref 0.8–1.5)
CREAT SERPL-MCNC: 2.2 MG/DL (ref 0.8–1.5)
CREAT SERPL-MCNC: 2.21 MG/DL (ref 0.8–1.5)
CREAT SERPL-MCNC: 2.23 MG/DL (ref 0.8–1.5)
CREAT SERPL-MCNC: 2.24 MG/DL (ref 0.8–1.5)
CREAT SERPL-MCNC: 2.29 MG/DL (ref 0.8–1.5)
CREAT SERPL-MCNC: 2.29 MG/DL (ref 0.8–1.5)
CREAT SERPL-MCNC: 2.39 MG/DL (ref 0.8–1.5)
CREAT SERPL-MCNC: 2.4 MG/DL (ref 0.8–1.5)
CREAT SERPL-MCNC: 2.4 MG/DL (ref 0.8–1.5)
CREAT SERPL-MCNC: 2.42 MG/DL (ref 0.8–1.5)
CREAT SERPL-MCNC: 2.44 MG/DL (ref 0.8–1.5)
CREAT SERPL-MCNC: 2.51 MG/DL (ref 0.8–1.5)
CREAT SERPL-MCNC: 2.57 MG/DL (ref 0.8–1.5)
CREAT SERPL-MCNC: 2.66 MG/DL (ref 0.8–1.5)
CREAT SERPL-MCNC: 2.67 MG/DL (ref 0.8–1.5)
CREAT SERPL-MCNC: 2.67 MG/DL (ref 0.8–1.5)
CREAT SERPL-MCNC: 2.74 MG/DL (ref 0.8–1.5)
CREAT SERPL-MCNC: 2.81 MG/DL (ref 0.8–1.5)
CREAT SERPL-MCNC: 2.89 MG/DL (ref 0.8–1.5)
CREAT SERPL-MCNC: 2.93 MG/DL (ref 0.8–1.5)
CREAT SERPL-MCNC: 2.97 MG/DL (ref 0.8–1.5)
CREAT SERPL-MCNC: 3 MG/DL (ref 0.8–1.5)
CREAT SERPL-MCNC: 3.1 MG/DL (ref 0.8–1.5)
CREAT SERPL-MCNC: 3.1 MG/DL (ref 0.8–1.5)
CREAT SERPL-MCNC: 3.11 MG/DL (ref 0.8–1.5)
CREAT SERPL-MCNC: 3.18 MG/DL (ref 0.8–1.5)
CREAT SERPL-MCNC: 3.18 MG/DL (ref 0.8–1.5)
CREAT SERPL-MCNC: 3.2 MG/DL (ref 0.8–1.5)
CREAT SERPL-MCNC: 3.21 MG/DL (ref 0.8–1.5)
CREAT SERPL-MCNC: 3.24 MG/DL (ref 0.8–1.5)
CREAT SERPL-MCNC: 3.35 MG/DL (ref 0.8–1.5)
CREAT SERPL-MCNC: 3.4 MG/DL (ref 0.8–1.5)
CREAT SERPL-MCNC: 3.51 MG/DL (ref 0.8–1.5)
CREAT SERPL-MCNC: 3.53 MG/DL (ref 0.8–1.5)
CREAT SERPL-MCNC: 3.56 MG/DL (ref 0.8–1.5)
CREAT SERPL-MCNC: 3.59 MG/DL (ref 0.8–1.5)
CREAT SERPL-MCNC: 3.61 MG/DL (ref 0.8–1.5)
CREAT SERPL-MCNC: 3.62 MG/DL (ref 0.8–1.5)
CREAT SERPL-MCNC: 3.63 MG/DL (ref 0.8–1.5)
CREAT SERPL-MCNC: 3.67 MG/DL (ref 0.8–1.5)
CREAT SERPL-MCNC: 3.77 MG/DL (ref 0.8–1.5)
CREAT SERPL-MCNC: 3.78 MG/DL (ref 0.8–1.5)
CREAT SERPL-MCNC: 3.8 MG/DL (ref 0.8–1.5)
CREAT SERPL-MCNC: 3.83 MG/DL (ref 0.8–1.5)
CREAT SERPL-MCNC: 3.85 MG/DL (ref 0.8–1.5)
CREAT SERPL-MCNC: 3.85 MG/DL (ref 0.8–1.5)
CREAT SERPL-MCNC: 4.05 MG/DL (ref 0.8–1.5)
CREAT SERPL-MCNC: 4.22 MG/DL (ref 0.8–1.5)
CREAT SERPL-MCNC: 4.27 MG/DL (ref 0.8–1.5)
CREAT SERPL-MCNC: 4.34 MG/DL (ref 0.8–1.5)
CREAT SERPL-MCNC: 4.39 MG/DL (ref 0.8–1.5)
CREAT SERPL-MCNC: 4.41 MG/DL (ref 0.8–1.5)
CREAT SERPL-MCNC: 4.5 MG/DL (ref 0.8–1.5)
CREAT SERPL-MCNC: 4.54 MG/DL (ref 0.8–1.5)
CREAT SERPL-MCNC: 4.57 MG/DL (ref 0.8–1.5)
CREAT SERPL-MCNC: 4.69 MG/DL (ref 0.8–1.5)
CREAT SERPL-MCNC: 4.86 MG/DL (ref 0.8–1.5)
CREAT SERPL-MCNC: 5.06 MG/DL (ref 0.8–1.5)
CREAT SERPL-MCNC: 5.07 MG/DL (ref 0.8–1.5)
CREAT SERPL-MCNC: 5.08 MG/DL (ref 0.8–1.5)
CREAT SERPL-MCNC: 5.11 MG/DL (ref 0.8–1.5)
CREAT SERPL-MCNC: 5.11 MG/DL (ref 0.8–1.5)
CREAT SERPL-MCNC: 5.14 MG/DL (ref 0.8–1.5)
CREAT SERPL-MCNC: 5.19 MG/DL (ref 0.8–1.5)
CREAT SERPL-MCNC: 5.58 MG/DL (ref 0.8–1.5)
CREAT SERPL-MCNC: 5.69 MG/DL (ref 0.8–1.5)
CREAT SERPL-MCNC: 5.81 MG/DL (ref 0.8–1.5)
CREAT SERPL-MCNC: 5.85 MG/DL (ref 0.8–1.5)
CREAT SERPL-MCNC: 5.97 MG/DL (ref 0.8–1.5)
CREAT SERPL-MCNC: 6.37 MG/DL (ref 0.8–1.5)
CREAT SERPL-MCNC: 6.69 MG/DL (ref 0.8–1.5)
CREAT SERPL-MCNC: 6.78 MG/DL (ref 0.8–1.5)
CREAT SERPL-MCNC: 6.92 MG/DL (ref 0.8–1.5)
CROSSMATCH RESULT,%XM: NORMAL
CRP SERPL-MCNC: 20.5 MG/DL (ref 0–0.9)
CRYSTALS URNS QL MICRO: 0 /LPF
D DIMER PPP FEU-MCNC: >20 UG/ML(FEU)
D DIMER PPP FEU-MCNC: >20 UG/ML(FEU)
DIAGNOSIS, 93000: NORMAL
DIFFERENTIAL METHOD BLD: ABNORMAL
EMERGENT DISEASE PANEL, EDPR: NOT DETECTED
EOSINOPHIL # BLD: 0 K/UL (ref 0–0.8)
EOSINOPHIL # BLD: 0.1 K/UL (ref 0–0.8)
EOSINOPHIL # BLD: 0.1 K/UL (ref 0–0.8)
EOSINOPHIL # BLD: 0.2 K/UL (ref 0–0.8)
EOSINOPHIL # BLD: 0.3 K/UL (ref 0–0.8)
EOSINOPHIL # BLD: 0.4 K/UL (ref 0–0.8)
EOSINOPHIL # BLD: 0.5 K/UL (ref 0–0.8)
EOSINOPHIL # BLD: 0.8 K/UL (ref 0–0.8)
EOSINOPHIL NFR BLD: 0 % (ref 0.5–7.8)
EOSINOPHIL NFR BLD: 1 % (ref 0.5–7.8)
EOSINOPHIL NFR BLD: 2 % (ref 0.5–7.8)
EOSINOPHIL NFR BLD: 3 % (ref 0.5–7.8)
EOSINOPHIL NFR BLD: 4 % (ref 0.5–7.8)
EOSINOPHIL NFR BLD: 5 % (ref 0.5–7.8)
EOSINOPHIL NFR BLD: 6 % (ref 0.5–7.8)
EOSINOPHIL NFR BLD: 6 % (ref 0.5–7.8)
EOSINOPHIL NFR BLD: 9 % (ref 0.5–7.8)
EOSINOPHIL NFR BRONCH MANUAL: 0 %
EPI CELLS #/AREA URNS HPF: ABNORMAL /HPF
ERYTHROCYTE [DISTWIDTH] IN BLOOD BY AUTOMATED COUNT: 12.4 % (ref 11.9–14.6)
ERYTHROCYTE [DISTWIDTH] IN BLOOD BY AUTOMATED COUNT: 13 % (ref 11.9–14.6)
ERYTHROCYTE [DISTWIDTH] IN BLOOD BY AUTOMATED COUNT: 13.4 % (ref 11.9–14.6)
ERYTHROCYTE [DISTWIDTH] IN BLOOD BY AUTOMATED COUNT: 13.7 % (ref 11.9–14.6)
ERYTHROCYTE [DISTWIDTH] IN BLOOD BY AUTOMATED COUNT: 13.7 % (ref 11.9–14.6)
ERYTHROCYTE [DISTWIDTH] IN BLOOD BY AUTOMATED COUNT: 13.8 % (ref 11.9–14.6)
ERYTHROCYTE [DISTWIDTH] IN BLOOD BY AUTOMATED COUNT: 13.9 % (ref 11.9–14.6)
ERYTHROCYTE [DISTWIDTH] IN BLOOD BY AUTOMATED COUNT: 14 % (ref 11.9–14.6)
ERYTHROCYTE [DISTWIDTH] IN BLOOD BY AUTOMATED COUNT: 14.1 % (ref 11.9–14.6)
ERYTHROCYTE [DISTWIDTH] IN BLOOD BY AUTOMATED COUNT: 14.2 % (ref 11.9–14.6)
ERYTHROCYTE [DISTWIDTH] IN BLOOD BY AUTOMATED COUNT: 14.3 % (ref 11.9–14.6)
ERYTHROCYTE [DISTWIDTH] IN BLOOD BY AUTOMATED COUNT: 14.3 % (ref 11.9–14.6)
ERYTHROCYTE [DISTWIDTH] IN BLOOD BY AUTOMATED COUNT: 14.4 % (ref 11.9–14.6)
ERYTHROCYTE [DISTWIDTH] IN BLOOD BY AUTOMATED COUNT: 14.5 % (ref 11.9–14.6)
ERYTHROCYTE [DISTWIDTH] IN BLOOD BY AUTOMATED COUNT: 14.6 % (ref 11.9–14.6)
ERYTHROCYTE [DISTWIDTH] IN BLOOD BY AUTOMATED COUNT: 14.7 % (ref 11.9–14.6)
ERYTHROCYTE [DISTWIDTH] IN BLOOD BY AUTOMATED COUNT: 14.8 % (ref 11.9–14.6)
ERYTHROCYTE [DISTWIDTH] IN BLOOD BY AUTOMATED COUNT: 14.8 % (ref 11.9–14.6)
ERYTHROCYTE [DISTWIDTH] IN BLOOD BY AUTOMATED COUNT: 14.9 % (ref 11.9–14.6)
ERYTHROCYTE [DISTWIDTH] IN BLOOD BY AUTOMATED COUNT: 15 % (ref 11.9–14.6)
ERYTHROCYTE [DISTWIDTH] IN BLOOD BY AUTOMATED COUNT: 15.1 % (ref 11.9–14.6)
ERYTHROCYTE [DISTWIDTH] IN BLOOD BY AUTOMATED COUNT: 15.2 % (ref 11.9–14.6)
ERYTHROCYTE [DISTWIDTH] IN BLOOD BY AUTOMATED COUNT: 15.3 % (ref 11.9–14.6)
ERYTHROCYTE [DISTWIDTH] IN BLOOD BY AUTOMATED COUNT: 15.4 % (ref 11.9–14.6)
ERYTHROCYTE [DISTWIDTH] IN BLOOD BY AUTOMATED COUNT: 15.5 % (ref 11.9–14.6)
ERYTHROCYTE [DISTWIDTH] IN BLOOD BY AUTOMATED COUNT: 15.6 % (ref 11.9–14.6)
ERYTHROCYTE [DISTWIDTH] IN BLOOD BY AUTOMATED COUNT: 15.7 % (ref 11.9–14.6)
ERYTHROCYTE [DISTWIDTH] IN BLOOD BY AUTOMATED COUNT: 15.8 % (ref 11.9–14.6)
ERYTHROCYTE [DISTWIDTH] IN BLOOD BY AUTOMATED COUNT: 15.9 % (ref 11.9–14.6)
ERYTHROCYTE [DISTWIDTH] IN BLOOD BY AUTOMATED COUNT: 16.2 % (ref 11.9–14.6)
ERYTHROCYTE [DISTWIDTH] IN BLOOD BY AUTOMATED COUNT: 16.3 % (ref 11.9–14.6)
ERYTHROCYTE [SEDIMENTATION RATE] IN BLOOD: 116 MM/HR (ref 0–20)
EST. AVERAGE GLUCOSE BLD GHB EST-MCNC: 171 MG/DL
EST. AVERAGE GLUCOSE BLD GHB EST-MCNC: 229 MG/DL
EXHALED MINUTE VOLUME, VE: 10.7 L/MIN
EXHALED MINUTE VOLUME, VE: 10.9 L/MIN
EXHALED MINUTE VOLUME, VE: 11 L/MIN
EXHALED MINUTE VOLUME, VE: 11.2 L/MIN
EXHALED MINUTE VOLUME, VE: 11.7 L/MIN
EXHALED MINUTE VOLUME, VE: 12 L/MIN
EXHALED MINUTE VOLUME, VE: 12.1 L/MIN
EXHALED MINUTE VOLUME, VE: 12.1 L/MIN
EXHALED MINUTE VOLUME, VE: 12.2 L/MIN
EXHALED MINUTE VOLUME, VE: 12.2 L/MIN
EXHALED MINUTE VOLUME, VE: 12.4 L/MIN
EXHALED MINUTE VOLUME, VE: 12.8 L/MIN
EXHALED MINUTE VOLUME, VE: 13 L/MIN
EXHALED MINUTE VOLUME, VE: 13.4 L/MIN
EXHALED MINUTE VOLUME, VE: 14 L/MIN
EXHALED MINUTE VOLUME, VE: 14.1 L/MIN
EXHALED MINUTE VOLUME, VE: 14.4 L/MIN
EXHALED MINUTE VOLUME, VE: 14.5 L/MIN
EXHALED MINUTE VOLUME, VE: 14.6 L/MIN
EXHALED MINUTE VOLUME, VE: 7.5 L/MIN
EXHALED MINUTE VOLUME, VE: 8.1 L/MIN
EXHALED MINUTE VOLUME, VE: 8.2 L/MIN
EXHALED MINUTE VOLUME, VE: 8.8 L/MIN
EXHALED MINUTE VOLUME, VE: 8.8 L/MIN
EXHALED MINUTE VOLUME, VE: 8.9 L/MIN
EXHALED MINUTE VOLUME, VE: 9 L/MIN
EXHALED MINUTE VOLUME, VE: 9.6 L/MIN
FERRITIN SERPL-MCNC: 333 NG/ML (ref 8–388)
FERRITIN SERPL-MCNC: 364 NG/ML (ref 8–388)
FERRITIN SERPL-MCNC: 557 NG/ML (ref 8–388)
FIBRINOGEN PPP-MCNC: 170 MG/DL (ref 190–501)
FIBRINOGEN PPP-MCNC: 214 MG/DL (ref 190–501)
FIBRINOGEN PPP-MCNC: 215 MG/DL (ref 190–501)
FIBRINOGEN PPP-MCNC: 471 MG/DL (ref 190–501)
FLOW RATE ISTAT,IFRATE: 15 L/MIN
FLOW RATE ISTAT,IFRATE: 3 L/MIN
FLOW RATE ISTAT,IFRATE: 5 L/MIN
FLOW RATE ISTAT,IFRATE: 50 L/MIN
FOLATE SERPL-MCNC: 14.8 NG/ML (ref 3.1–17.5)
FOLATE SERPL-MCNC: 19.6 NG/ML (ref 3.1–17.5)
FOLATE SERPL-MCNC: 23.6 NG/ML (ref 3.1–17.5)
GAS FLOW.O2 O2 DELIVERY SYS: ABNORMAL L/MIN
GAS FLOW.O2 SETTING OXYMISER: 14 BPM
GAS FLOW.O2 SETTING OXYMISER: 15 BPM
GAS FLOW.O2 SETTING OXYMISER: 16 BPM
GAS FLOW.O2 SETTING OXYMISER: 16 BPM
GAS FLOW.O2 SETTING OXYMISER: 18 BPM
GAS FLOW.O2 SETTING OXYMISER: 22 BPM
GAS FLOW.O2 SETTING OXYMISER: 25 BPM
GAS FLOW.O2 SETTING OXYMISER: 28 BPM
GAS FLOW.O2 SETTING OXYMISER: 30 BPM
GAS FLOW.O2 SETTING OXYMISER: 30 BPM
GAS FLOW.O2 SETTING OXYMISER: 5 BPM
GLOBULIN SER CALC-MCNC: 2.8 G/DL (ref 2.3–3.5)
GLOBULIN SER CALC-MCNC: 3.2 G/DL (ref 2.3–3.5)
GLOBULIN SER CALC-MCNC: 3.3 G/DL (ref 2.3–3.5)
GLOBULIN SER CALC-MCNC: 3.4 G/DL (ref 2.3–3.5)
GLOBULIN SER CALC-MCNC: 3.8 G/DL (ref 2.3–3.5)
GLOBULIN SER CALC-MCNC: 3.8 G/DL (ref 2.3–3.5)
GLOBULIN SER CALC-MCNC: 3.9 G/DL (ref 2.3–3.5)
GLOBULIN SER CALC-MCNC: 4.5 G/DL (ref 2.3–3.5)
GLOBULIN SER CALC-MCNC: 4.9 G/DL (ref 2.3–3.5)
GLOBULIN SER CALC-MCNC: 4.9 G/DL (ref 2.3–3.5)
GLOBULIN SER CALC-MCNC: 5 G/DL (ref 2.3–3.5)
GLOBULIN SER CALC-MCNC: 5.1 G/DL (ref 2.3–3.5)
GLOBULIN SER CALC-MCNC: 5.2 G/DL (ref 2.3–3.5)
GLOBULIN SER CALC-MCNC: 5.3 G/DL (ref 2.3–3.5)
GLOBULIN SER CALC-MCNC: 5.4 G/DL (ref 2.3–3.5)
GLUCOSE BLD STRIP.AUTO-MCNC: 100 MG/DL (ref 65–100)
GLUCOSE BLD STRIP.AUTO-MCNC: 101 MG/DL (ref 65–100)
GLUCOSE BLD STRIP.AUTO-MCNC: 104 MG/DL (ref 65–100)
GLUCOSE BLD STRIP.AUTO-MCNC: 105 MG/DL (ref 65–100)
GLUCOSE BLD STRIP.AUTO-MCNC: 106 MG/DL (ref 65–100)
GLUCOSE BLD STRIP.AUTO-MCNC: 106 MG/DL (ref 65–100)
GLUCOSE BLD STRIP.AUTO-MCNC: 107 MG/DL (ref 65–100)
GLUCOSE BLD STRIP.AUTO-MCNC: 108 MG/DL (ref 65–100)
GLUCOSE BLD STRIP.AUTO-MCNC: 109 MG/DL (ref 65–100)
GLUCOSE BLD STRIP.AUTO-MCNC: 110 MG/DL (ref 65–100)
GLUCOSE BLD STRIP.AUTO-MCNC: 111 MG/DL (ref 65–100)
GLUCOSE BLD STRIP.AUTO-MCNC: 112 MG/DL (ref 65–100)
GLUCOSE BLD STRIP.AUTO-MCNC: 113 MG/DL (ref 65–100)
GLUCOSE BLD STRIP.AUTO-MCNC: 114 MG/DL (ref 65–100)
GLUCOSE BLD STRIP.AUTO-MCNC: 115 MG/DL (ref 65–100)
GLUCOSE BLD STRIP.AUTO-MCNC: 115 MG/DL (ref 65–100)
GLUCOSE BLD STRIP.AUTO-MCNC: 116 MG/DL (ref 65–100)
GLUCOSE BLD STRIP.AUTO-MCNC: 118 MG/DL (ref 65–100)
GLUCOSE BLD STRIP.AUTO-MCNC: 119 MG/DL (ref 65–100)
GLUCOSE BLD STRIP.AUTO-MCNC: 120 MG/DL (ref 65–100)
GLUCOSE BLD STRIP.AUTO-MCNC: 121 MG/DL (ref 65–100)
GLUCOSE BLD STRIP.AUTO-MCNC: 122 MG/DL (ref 65–100)
GLUCOSE BLD STRIP.AUTO-MCNC: 123 MG/DL (ref 65–100)
GLUCOSE BLD STRIP.AUTO-MCNC: 124 MG/DL (ref 65–100)
GLUCOSE BLD STRIP.AUTO-MCNC: 125 MG/DL (ref 65–100)
GLUCOSE BLD STRIP.AUTO-MCNC: 126 MG/DL (ref 65–100)
GLUCOSE BLD STRIP.AUTO-MCNC: 127 MG/DL (ref 65–100)
GLUCOSE BLD STRIP.AUTO-MCNC: 128 MG/DL (ref 65–100)
GLUCOSE BLD STRIP.AUTO-MCNC: 128 MG/DL (ref 65–100)
GLUCOSE BLD STRIP.AUTO-MCNC: 129 MG/DL (ref 65–100)
GLUCOSE BLD STRIP.AUTO-MCNC: 130 MG/DL (ref 65–100)
GLUCOSE BLD STRIP.AUTO-MCNC: 131 MG/DL (ref 65–100)
GLUCOSE BLD STRIP.AUTO-MCNC: 132 MG/DL (ref 65–100)
GLUCOSE BLD STRIP.AUTO-MCNC: 133 MG/DL (ref 65–100)
GLUCOSE BLD STRIP.AUTO-MCNC: 134 MG/DL (ref 65–100)
GLUCOSE BLD STRIP.AUTO-MCNC: 134 MG/DL (ref 65–100)
GLUCOSE BLD STRIP.AUTO-MCNC: 135 MG/DL (ref 65–100)
GLUCOSE BLD STRIP.AUTO-MCNC: 136 MG/DL (ref 65–100)
GLUCOSE BLD STRIP.AUTO-MCNC: 136 MG/DL (ref 65–100)
GLUCOSE BLD STRIP.AUTO-MCNC: 137 MG/DL (ref 65–100)
GLUCOSE BLD STRIP.AUTO-MCNC: 138 MG/DL (ref 65–100)
GLUCOSE BLD STRIP.AUTO-MCNC: 139 MG/DL (ref 65–100)
GLUCOSE BLD STRIP.AUTO-MCNC: 140 MG/DL (ref 65–100)
GLUCOSE BLD STRIP.AUTO-MCNC: 141 MG/DL (ref 65–100)
GLUCOSE BLD STRIP.AUTO-MCNC: 142 MG/DL (ref 65–100)
GLUCOSE BLD STRIP.AUTO-MCNC: 143 MG/DL (ref 65–100)
GLUCOSE BLD STRIP.AUTO-MCNC: 143 MG/DL (ref 65–100)
GLUCOSE BLD STRIP.AUTO-MCNC: 144 MG/DL (ref 65–100)
GLUCOSE BLD STRIP.AUTO-MCNC: 145 MG/DL (ref 65–100)
GLUCOSE BLD STRIP.AUTO-MCNC: 145 MG/DL (ref 65–100)
GLUCOSE BLD STRIP.AUTO-MCNC: 146 MG/DL (ref 65–100)
GLUCOSE BLD STRIP.AUTO-MCNC: 147 MG/DL (ref 65–100)
GLUCOSE BLD STRIP.AUTO-MCNC: 148 MG/DL (ref 65–100)
GLUCOSE BLD STRIP.AUTO-MCNC: 149 MG/DL (ref 65–100)
GLUCOSE BLD STRIP.AUTO-MCNC: 150 MG/DL (ref 65–100)
GLUCOSE BLD STRIP.AUTO-MCNC: 150 MG/DL (ref 65–100)
GLUCOSE BLD STRIP.AUTO-MCNC: 151 MG/DL (ref 65–100)
GLUCOSE BLD STRIP.AUTO-MCNC: 152 MG/DL (ref 65–100)
GLUCOSE BLD STRIP.AUTO-MCNC: 153 MG/DL (ref 65–100)
GLUCOSE BLD STRIP.AUTO-MCNC: 154 MG/DL (ref 65–100)
GLUCOSE BLD STRIP.AUTO-MCNC: 155 MG/DL (ref 65–100)
GLUCOSE BLD STRIP.AUTO-MCNC: 156 MG/DL (ref 65–100)
GLUCOSE BLD STRIP.AUTO-MCNC: 157 MG/DL (ref 65–100)
GLUCOSE BLD STRIP.AUTO-MCNC: 158 MG/DL (ref 65–100)
GLUCOSE BLD STRIP.AUTO-MCNC: 159 MG/DL (ref 65–100)
GLUCOSE BLD STRIP.AUTO-MCNC: 160 MG/DL (ref 65–100)
GLUCOSE BLD STRIP.AUTO-MCNC: 161 MG/DL (ref 65–100)
GLUCOSE BLD STRIP.AUTO-MCNC: 161 MG/DL (ref 65–100)
GLUCOSE BLD STRIP.AUTO-MCNC: 162 MG/DL (ref 65–100)
GLUCOSE BLD STRIP.AUTO-MCNC: 163 MG/DL (ref 65–100)
GLUCOSE BLD STRIP.AUTO-MCNC: 164 MG/DL (ref 65–100)
GLUCOSE BLD STRIP.AUTO-MCNC: 165 MG/DL (ref 65–100)
GLUCOSE BLD STRIP.AUTO-MCNC: 166 MG/DL (ref 65–100)
GLUCOSE BLD STRIP.AUTO-MCNC: 166 MG/DL (ref 65–100)
GLUCOSE BLD STRIP.AUTO-MCNC: 167 MG/DL (ref 65–100)
GLUCOSE BLD STRIP.AUTO-MCNC: 168 MG/DL (ref 65–100)
GLUCOSE BLD STRIP.AUTO-MCNC: 169 MG/DL (ref 65–100)
GLUCOSE BLD STRIP.AUTO-MCNC: 169 MG/DL (ref 65–100)
GLUCOSE BLD STRIP.AUTO-MCNC: 170 MG/DL (ref 65–100)
GLUCOSE BLD STRIP.AUTO-MCNC: 170 MG/DL (ref 65–100)
GLUCOSE BLD STRIP.AUTO-MCNC: 171 MG/DL (ref 65–100)
GLUCOSE BLD STRIP.AUTO-MCNC: 171 MG/DL (ref 65–100)
GLUCOSE BLD STRIP.AUTO-MCNC: 172 MG/DL (ref 65–100)
GLUCOSE BLD STRIP.AUTO-MCNC: 172 MG/DL (ref 65–100)
GLUCOSE BLD STRIP.AUTO-MCNC: 174 MG/DL (ref 65–100)
GLUCOSE BLD STRIP.AUTO-MCNC: 175 MG/DL (ref 65–100)
GLUCOSE BLD STRIP.AUTO-MCNC: 176 MG/DL (ref 65–100)
GLUCOSE BLD STRIP.AUTO-MCNC: 176 MG/DL (ref 65–100)
GLUCOSE BLD STRIP.AUTO-MCNC: 177 MG/DL (ref 65–100)
GLUCOSE BLD STRIP.AUTO-MCNC: 178 MG/DL (ref 65–100)
GLUCOSE BLD STRIP.AUTO-MCNC: 179 MG/DL (ref 65–100)
GLUCOSE BLD STRIP.AUTO-MCNC: 180 MG/DL (ref 65–100)
GLUCOSE BLD STRIP.AUTO-MCNC: 181 MG/DL (ref 65–100)
GLUCOSE BLD STRIP.AUTO-MCNC: 182 MG/DL (ref 65–100)
GLUCOSE BLD STRIP.AUTO-MCNC: 183 MG/DL (ref 65–100)
GLUCOSE BLD STRIP.AUTO-MCNC: 183 MG/DL (ref 65–100)
GLUCOSE BLD STRIP.AUTO-MCNC: 184 MG/DL (ref 65–100)
GLUCOSE BLD STRIP.AUTO-MCNC: 185 MG/DL (ref 65–100)
GLUCOSE BLD STRIP.AUTO-MCNC: 186 MG/DL (ref 65–100)
GLUCOSE BLD STRIP.AUTO-MCNC: 187 MG/DL (ref 65–100)
GLUCOSE BLD STRIP.AUTO-MCNC: 188 MG/DL (ref 65–100)
GLUCOSE BLD STRIP.AUTO-MCNC: 188 MG/DL (ref 65–100)
GLUCOSE BLD STRIP.AUTO-MCNC: 189 MG/DL (ref 65–100)
GLUCOSE BLD STRIP.AUTO-MCNC: 189 MG/DL (ref 65–100)
GLUCOSE BLD STRIP.AUTO-MCNC: 190 MG/DL (ref 65–100)
GLUCOSE BLD STRIP.AUTO-MCNC: 190 MG/DL (ref 65–100)
GLUCOSE BLD STRIP.AUTO-MCNC: 191 MG/DL (ref 65–100)
GLUCOSE BLD STRIP.AUTO-MCNC: 192 MG/DL (ref 65–100)
GLUCOSE BLD STRIP.AUTO-MCNC: 194 MG/DL (ref 65–100)
GLUCOSE BLD STRIP.AUTO-MCNC: 195 MG/DL (ref 65–100)
GLUCOSE BLD STRIP.AUTO-MCNC: 196 MG/DL (ref 65–100)
GLUCOSE BLD STRIP.AUTO-MCNC: 198 MG/DL (ref 65–100)
GLUCOSE BLD STRIP.AUTO-MCNC: 199 MG/DL (ref 65–100)
GLUCOSE BLD STRIP.AUTO-MCNC: 200 MG/DL (ref 65–100)
GLUCOSE BLD STRIP.AUTO-MCNC: 201 MG/DL (ref 65–100)
GLUCOSE BLD STRIP.AUTO-MCNC: 201 MG/DL (ref 65–100)
GLUCOSE BLD STRIP.AUTO-MCNC: 203 MG/DL (ref 65–100)
GLUCOSE BLD STRIP.AUTO-MCNC: 204 MG/DL (ref 65–100)
GLUCOSE BLD STRIP.AUTO-MCNC: 205 MG/DL (ref 65–100)
GLUCOSE BLD STRIP.AUTO-MCNC: 205 MG/DL (ref 65–100)
GLUCOSE BLD STRIP.AUTO-MCNC: 206 MG/DL (ref 65–100)
GLUCOSE BLD STRIP.AUTO-MCNC: 206 MG/DL (ref 65–100)
GLUCOSE BLD STRIP.AUTO-MCNC: 208 MG/DL (ref 65–100)
GLUCOSE BLD STRIP.AUTO-MCNC: 210 MG/DL (ref 65–100)
GLUCOSE BLD STRIP.AUTO-MCNC: 211 MG/DL (ref 65–100)
GLUCOSE BLD STRIP.AUTO-MCNC: 211 MG/DL (ref 65–100)
GLUCOSE BLD STRIP.AUTO-MCNC: 213 MG/DL (ref 65–100)
GLUCOSE BLD STRIP.AUTO-MCNC: 214 MG/DL (ref 65–100)
GLUCOSE BLD STRIP.AUTO-MCNC: 215 MG/DL (ref 65–100)
GLUCOSE BLD STRIP.AUTO-MCNC: 215 MG/DL (ref 65–100)
GLUCOSE BLD STRIP.AUTO-MCNC: 216 MG/DL (ref 65–100)
GLUCOSE BLD STRIP.AUTO-MCNC: 217 MG/DL (ref 65–100)
GLUCOSE BLD STRIP.AUTO-MCNC: 218 MG/DL (ref 65–100)
GLUCOSE BLD STRIP.AUTO-MCNC: 218 MG/DL (ref 65–100)
GLUCOSE BLD STRIP.AUTO-MCNC: 219 MG/DL (ref 65–100)
GLUCOSE BLD STRIP.AUTO-MCNC: 220 MG/DL (ref 65–100)
GLUCOSE BLD STRIP.AUTO-MCNC: 221 MG/DL (ref 65–100)
GLUCOSE BLD STRIP.AUTO-MCNC: 221 MG/DL (ref 65–100)
GLUCOSE BLD STRIP.AUTO-MCNC: 222 MG/DL (ref 65–100)
GLUCOSE BLD STRIP.AUTO-MCNC: 222 MG/DL (ref 65–100)
GLUCOSE BLD STRIP.AUTO-MCNC: 223 MG/DL (ref 65–100)
GLUCOSE BLD STRIP.AUTO-MCNC: 224 MG/DL (ref 65–100)
GLUCOSE BLD STRIP.AUTO-MCNC: 224 MG/DL (ref 65–100)
GLUCOSE BLD STRIP.AUTO-MCNC: 225 MG/DL (ref 65–100)
GLUCOSE BLD STRIP.AUTO-MCNC: 227 MG/DL (ref 65–100)
GLUCOSE BLD STRIP.AUTO-MCNC: 228 MG/DL (ref 65–100)
GLUCOSE BLD STRIP.AUTO-MCNC: 228 MG/DL (ref 65–100)
GLUCOSE BLD STRIP.AUTO-MCNC: 229 MG/DL (ref 65–100)
GLUCOSE BLD STRIP.AUTO-MCNC: 229 MG/DL (ref 65–100)
GLUCOSE BLD STRIP.AUTO-MCNC: 230 MG/DL (ref 65–100)
GLUCOSE BLD STRIP.AUTO-MCNC: 231 MG/DL (ref 65–100)
GLUCOSE BLD STRIP.AUTO-MCNC: 232 MG/DL (ref 65–100)
GLUCOSE BLD STRIP.AUTO-MCNC: 233 MG/DL (ref 65–100)
GLUCOSE BLD STRIP.AUTO-MCNC: 233 MG/DL (ref 65–100)
GLUCOSE BLD STRIP.AUTO-MCNC: 234 MG/DL (ref 65–100)
GLUCOSE BLD STRIP.AUTO-MCNC: 235 MG/DL (ref 65–100)
GLUCOSE BLD STRIP.AUTO-MCNC: 238 MG/DL (ref 65–100)
GLUCOSE BLD STRIP.AUTO-MCNC: 239 MG/DL (ref 65–100)
GLUCOSE BLD STRIP.AUTO-MCNC: 240 MG/DL (ref 65–100)
GLUCOSE BLD STRIP.AUTO-MCNC: 241 MG/DL (ref 65–100)
GLUCOSE BLD STRIP.AUTO-MCNC: 242 MG/DL (ref 65–100)
GLUCOSE BLD STRIP.AUTO-MCNC: 242 MG/DL (ref 65–100)
GLUCOSE BLD STRIP.AUTO-MCNC: 243 MG/DL (ref 65–100)
GLUCOSE BLD STRIP.AUTO-MCNC: 243 MG/DL (ref 65–100)
GLUCOSE BLD STRIP.AUTO-MCNC: 245 MG/DL (ref 65–100)
GLUCOSE BLD STRIP.AUTO-MCNC: 246 MG/DL (ref 65–100)
GLUCOSE BLD STRIP.AUTO-MCNC: 247 MG/DL (ref 65–100)
GLUCOSE BLD STRIP.AUTO-MCNC: 248 MG/DL (ref 65–100)
GLUCOSE BLD STRIP.AUTO-MCNC: 248 MG/DL (ref 65–100)
GLUCOSE BLD STRIP.AUTO-MCNC: 249 MG/DL (ref 65–100)
GLUCOSE BLD STRIP.AUTO-MCNC: 250 MG/DL (ref 65–100)
GLUCOSE BLD STRIP.AUTO-MCNC: 251 MG/DL (ref 65–100)
GLUCOSE BLD STRIP.AUTO-MCNC: 251 MG/DL (ref 65–100)
GLUCOSE BLD STRIP.AUTO-MCNC: 252 MG/DL (ref 65–100)
GLUCOSE BLD STRIP.AUTO-MCNC: 252 MG/DL (ref 65–100)
GLUCOSE BLD STRIP.AUTO-MCNC: 253 MG/DL (ref 65–100)
GLUCOSE BLD STRIP.AUTO-MCNC: 253 MG/DL (ref 65–100)
GLUCOSE BLD STRIP.AUTO-MCNC: 254 MG/DL (ref 65–100)
GLUCOSE BLD STRIP.AUTO-MCNC: 254 MG/DL (ref 65–100)
GLUCOSE BLD STRIP.AUTO-MCNC: 255 MG/DL (ref 65–100)
GLUCOSE BLD STRIP.AUTO-MCNC: 256 MG/DL (ref 65–100)
GLUCOSE BLD STRIP.AUTO-MCNC: 257 MG/DL (ref 65–100)
GLUCOSE BLD STRIP.AUTO-MCNC: 258 MG/DL (ref 65–100)
GLUCOSE BLD STRIP.AUTO-MCNC: 260 MG/DL (ref 65–100)
GLUCOSE BLD STRIP.AUTO-MCNC: 262 MG/DL (ref 65–100)
GLUCOSE BLD STRIP.AUTO-MCNC: 263 MG/DL (ref 65–100)
GLUCOSE BLD STRIP.AUTO-MCNC: 264 MG/DL (ref 65–100)
GLUCOSE BLD STRIP.AUTO-MCNC: 266 MG/DL (ref 65–100)
GLUCOSE BLD STRIP.AUTO-MCNC: 267 MG/DL (ref 65–100)
GLUCOSE BLD STRIP.AUTO-MCNC: 268 MG/DL (ref 65–100)
GLUCOSE BLD STRIP.AUTO-MCNC: 269 MG/DL (ref 65–100)
GLUCOSE BLD STRIP.AUTO-MCNC: 270 MG/DL (ref 65–100)
GLUCOSE BLD STRIP.AUTO-MCNC: 271 MG/DL (ref 65–100)
GLUCOSE BLD STRIP.AUTO-MCNC: 272 MG/DL (ref 65–100)
GLUCOSE BLD STRIP.AUTO-MCNC: 273 MG/DL (ref 65–100)
GLUCOSE BLD STRIP.AUTO-MCNC: 274 MG/DL (ref 65–100)
GLUCOSE BLD STRIP.AUTO-MCNC: 276 MG/DL (ref 65–100)
GLUCOSE BLD STRIP.AUTO-MCNC: 280 MG/DL (ref 65–100)
GLUCOSE BLD STRIP.AUTO-MCNC: 280 MG/DL (ref 65–100)
GLUCOSE BLD STRIP.AUTO-MCNC: 283 MG/DL (ref 65–100)
GLUCOSE BLD STRIP.AUTO-MCNC: 285 MG/DL (ref 65–100)
GLUCOSE BLD STRIP.AUTO-MCNC: 288 MG/DL (ref 65–100)
GLUCOSE BLD STRIP.AUTO-MCNC: 289 MG/DL (ref 65–100)
GLUCOSE BLD STRIP.AUTO-MCNC: 291 MG/DL (ref 65–100)
GLUCOSE BLD STRIP.AUTO-MCNC: 291 MG/DL (ref 65–100)
GLUCOSE BLD STRIP.AUTO-MCNC: 292 MG/DL (ref 65–100)
GLUCOSE BLD STRIP.AUTO-MCNC: 295 MG/DL (ref 65–100)
GLUCOSE BLD STRIP.AUTO-MCNC: 298 MG/DL (ref 65–100)
GLUCOSE BLD STRIP.AUTO-MCNC: 299 MG/DL (ref 65–100)
GLUCOSE BLD STRIP.AUTO-MCNC: 300 MG/DL (ref 65–100)
GLUCOSE BLD STRIP.AUTO-MCNC: 301 MG/DL (ref 65–100)
GLUCOSE BLD STRIP.AUTO-MCNC: 302 MG/DL (ref 65–100)
GLUCOSE BLD STRIP.AUTO-MCNC: 304 MG/DL (ref 65–100)
GLUCOSE BLD STRIP.AUTO-MCNC: 305 MG/DL (ref 65–100)
GLUCOSE BLD STRIP.AUTO-MCNC: 305 MG/DL (ref 65–100)
GLUCOSE BLD STRIP.AUTO-MCNC: 306 MG/DL (ref 65–100)
GLUCOSE BLD STRIP.AUTO-MCNC: 312 MG/DL (ref 65–100)
GLUCOSE BLD STRIP.AUTO-MCNC: 315 MG/DL (ref 65–100)
GLUCOSE BLD STRIP.AUTO-MCNC: 323 MG/DL (ref 65–100)
GLUCOSE BLD STRIP.AUTO-MCNC: 323 MG/DL (ref 65–100)
GLUCOSE BLD STRIP.AUTO-MCNC: 327 MG/DL (ref 65–100)
GLUCOSE BLD STRIP.AUTO-MCNC: 333 MG/DL (ref 65–100)
GLUCOSE BLD STRIP.AUTO-MCNC: 337 MG/DL (ref 65–100)
GLUCOSE BLD STRIP.AUTO-MCNC: 342 MG/DL (ref 65–100)
GLUCOSE BLD STRIP.AUTO-MCNC: 353 MG/DL (ref 65–100)
GLUCOSE BLD STRIP.AUTO-MCNC: 357 MG/DL (ref 65–100)
GLUCOSE BLD STRIP.AUTO-MCNC: 419 MG/DL (ref 65–100)
GLUCOSE BLD STRIP.AUTO-MCNC: 50 MG/DL (ref 65–100)
GLUCOSE BLD STRIP.AUTO-MCNC: 52 MG/DL (ref 65–100)
GLUCOSE BLD STRIP.AUTO-MCNC: 56 MG/DL (ref 65–100)
GLUCOSE BLD STRIP.AUTO-MCNC: 64 MG/DL (ref 65–100)
GLUCOSE BLD STRIP.AUTO-MCNC: 65 MG/DL (ref 65–100)
GLUCOSE BLD STRIP.AUTO-MCNC: 68 MG/DL (ref 65–100)
GLUCOSE BLD STRIP.AUTO-MCNC: 69 MG/DL (ref 65–100)
GLUCOSE BLD STRIP.AUTO-MCNC: 70 MG/DL (ref 65–100)
GLUCOSE BLD STRIP.AUTO-MCNC: 72 MG/DL (ref 65–100)
GLUCOSE BLD STRIP.AUTO-MCNC: 73 MG/DL (ref 65–100)
GLUCOSE BLD STRIP.AUTO-MCNC: 75 MG/DL (ref 65–100)
GLUCOSE BLD STRIP.AUTO-MCNC: 75 MG/DL (ref 65–100)
GLUCOSE BLD STRIP.AUTO-MCNC: 77 MG/DL (ref 65–100)
GLUCOSE BLD STRIP.AUTO-MCNC: 78 MG/DL (ref 65–100)
GLUCOSE BLD STRIP.AUTO-MCNC: 80 MG/DL (ref 65–100)
GLUCOSE BLD STRIP.AUTO-MCNC: 81 MG/DL (ref 65–100)
GLUCOSE BLD STRIP.AUTO-MCNC: 82 MG/DL (ref 65–100)
GLUCOSE BLD STRIP.AUTO-MCNC: 82 MG/DL (ref 65–100)
GLUCOSE BLD STRIP.AUTO-MCNC: 83 MG/DL (ref 65–100)
GLUCOSE BLD STRIP.AUTO-MCNC: 84 MG/DL (ref 65–100)
GLUCOSE BLD STRIP.AUTO-MCNC: 86 MG/DL (ref 65–100)
GLUCOSE BLD STRIP.AUTO-MCNC: 87 MG/DL (ref 65–100)
GLUCOSE BLD STRIP.AUTO-MCNC: 87 MG/DL (ref 65–100)
GLUCOSE BLD STRIP.AUTO-MCNC: 89 MG/DL (ref 65–100)
GLUCOSE BLD STRIP.AUTO-MCNC: 90 MG/DL (ref 65–100)
GLUCOSE BLD STRIP.AUTO-MCNC: 92 MG/DL (ref 65–100)
GLUCOSE BLD STRIP.AUTO-MCNC: 93 MG/DL (ref 65–100)
GLUCOSE BLD STRIP.AUTO-MCNC: 94 MG/DL (ref 65–100)
GLUCOSE BLD STRIP.AUTO-MCNC: 95 MG/DL (ref 65–100)
GLUCOSE BLD STRIP.AUTO-MCNC: 97 MG/DL (ref 65–100)
GLUCOSE BLD STRIP.AUTO-MCNC: 97 MG/DL (ref 65–100)
GLUCOSE BLD STRIP.AUTO-MCNC: 98 MG/DL (ref 65–100)
GLUCOSE BLD STRIP.AUTO-MCNC: 99 MG/DL (ref 65–100)
GLUCOSE SERPL-MCNC: 101 MG/DL (ref 65–100)
GLUCOSE SERPL-MCNC: 105 MG/DL (ref 65–100)
GLUCOSE SERPL-MCNC: 107 MG/DL (ref 65–100)
GLUCOSE SERPL-MCNC: 108 MG/DL (ref 65–100)
GLUCOSE SERPL-MCNC: 108 MG/DL (ref 65–100)
GLUCOSE SERPL-MCNC: 109 MG/DL (ref 65–100)
GLUCOSE SERPL-MCNC: 109 MG/DL (ref 65–100)
GLUCOSE SERPL-MCNC: 110 MG/DL (ref 65–100)
GLUCOSE SERPL-MCNC: 111 MG/DL (ref 65–100)
GLUCOSE SERPL-MCNC: 112 MG/DL (ref 65–100)
GLUCOSE SERPL-MCNC: 113 MG/DL (ref 65–100)
GLUCOSE SERPL-MCNC: 114 MG/DL (ref 65–100)
GLUCOSE SERPL-MCNC: 115 MG/DL (ref 65–100)
GLUCOSE SERPL-MCNC: 116 MG/DL (ref 65–100)
GLUCOSE SERPL-MCNC: 117 MG/DL (ref 65–100)
GLUCOSE SERPL-MCNC: 119 MG/DL (ref 65–100)
GLUCOSE SERPL-MCNC: 119 MG/DL (ref 65–100)
GLUCOSE SERPL-MCNC: 120 MG/DL (ref 65–100)
GLUCOSE SERPL-MCNC: 120 MG/DL (ref 65–100)
GLUCOSE SERPL-MCNC: 121 MG/DL (ref 65–100)
GLUCOSE SERPL-MCNC: 123 MG/DL (ref 65–100)
GLUCOSE SERPL-MCNC: 125 MG/DL (ref 65–100)
GLUCOSE SERPL-MCNC: 125 MG/DL (ref 65–100)
GLUCOSE SERPL-MCNC: 126 MG/DL (ref 65–100)
GLUCOSE SERPL-MCNC: 127 MG/DL (ref 65–100)
GLUCOSE SERPL-MCNC: 128 MG/DL (ref 65–100)
GLUCOSE SERPL-MCNC: 129 MG/DL (ref 65–100)
GLUCOSE SERPL-MCNC: 132 MG/DL (ref 65–100)
GLUCOSE SERPL-MCNC: 133 MG/DL (ref 65–100)
GLUCOSE SERPL-MCNC: 136 MG/DL (ref 65–100)
GLUCOSE SERPL-MCNC: 139 MG/DL (ref 65–100)
GLUCOSE SERPL-MCNC: 140 MG/DL (ref 65–100)
GLUCOSE SERPL-MCNC: 142 MG/DL (ref 65–100)
GLUCOSE SERPL-MCNC: 142 MG/DL (ref 65–100)
GLUCOSE SERPL-MCNC: 147 MG/DL (ref 65–100)
GLUCOSE SERPL-MCNC: 150 MG/DL (ref 65–100)
GLUCOSE SERPL-MCNC: 150 MG/DL (ref 65–100)
GLUCOSE SERPL-MCNC: 151 MG/DL (ref 65–100)
GLUCOSE SERPL-MCNC: 155 MG/DL (ref 65–100)
GLUCOSE SERPL-MCNC: 158 MG/DL (ref 65–100)
GLUCOSE SERPL-MCNC: 158 MG/DL (ref 65–100)
GLUCOSE SERPL-MCNC: 159 MG/DL (ref 65–100)
GLUCOSE SERPL-MCNC: 160 MG/DL (ref 65–100)
GLUCOSE SERPL-MCNC: 160 MG/DL (ref 65–100)
GLUCOSE SERPL-MCNC: 162 MG/DL (ref 65–100)
GLUCOSE SERPL-MCNC: 163 MG/DL (ref 65–100)
GLUCOSE SERPL-MCNC: 170 MG/DL (ref 65–100)
GLUCOSE SERPL-MCNC: 170 MG/DL (ref 65–100)
GLUCOSE SERPL-MCNC: 171 MG/DL (ref 65–100)
GLUCOSE SERPL-MCNC: 172 MG/DL (ref 65–100)
GLUCOSE SERPL-MCNC: 173 MG/DL (ref 65–100)
GLUCOSE SERPL-MCNC: 173 MG/DL (ref 65–100)
GLUCOSE SERPL-MCNC: 174 MG/DL (ref 65–100)
GLUCOSE SERPL-MCNC: 174 MG/DL (ref 65–100)
GLUCOSE SERPL-MCNC: 175 MG/DL (ref 65–100)
GLUCOSE SERPL-MCNC: 176 MG/DL (ref 65–100)
GLUCOSE SERPL-MCNC: 178 MG/DL (ref 65–100)
GLUCOSE SERPL-MCNC: 180 MG/DL (ref 65–100)
GLUCOSE SERPL-MCNC: 180 MG/DL (ref 65–100)
GLUCOSE SERPL-MCNC: 181 MG/DL (ref 65–100)
GLUCOSE SERPL-MCNC: 183 MG/DL (ref 65–100)
GLUCOSE SERPL-MCNC: 184 MG/DL (ref 65–100)
GLUCOSE SERPL-MCNC: 185 MG/DL (ref 65–100)
GLUCOSE SERPL-MCNC: 186 MG/DL (ref 65–100)
GLUCOSE SERPL-MCNC: 188 MG/DL (ref 65–100)
GLUCOSE SERPL-MCNC: 193 MG/DL (ref 65–100)
GLUCOSE SERPL-MCNC: 195 MG/DL (ref 65–100)
GLUCOSE SERPL-MCNC: 200 MG/DL (ref 65–100)
GLUCOSE SERPL-MCNC: 213 MG/DL (ref 65–100)
GLUCOSE SERPL-MCNC: 221 MG/DL (ref 65–100)
GLUCOSE SERPL-MCNC: 230 MG/DL (ref 65–100)
GLUCOSE SERPL-MCNC: 232 MG/DL (ref 65–100)
GLUCOSE SERPL-MCNC: 250 MG/DL (ref 65–100)
GLUCOSE SERPL-MCNC: 253 MG/DL (ref 65–100)
GLUCOSE SERPL-MCNC: 254 MG/DL (ref 65–100)
GLUCOSE SERPL-MCNC: 262 MG/DL (ref 65–100)
GLUCOSE SERPL-MCNC: 272 MG/DL (ref 65–100)
GLUCOSE SERPL-MCNC: 64 MG/DL (ref 65–100)
GLUCOSE SERPL-MCNC: 77 MG/DL (ref 65–100)
GLUCOSE SERPL-MCNC: 84 MG/DL (ref 65–100)
GLUCOSE SERPL-MCNC: 85 MG/DL (ref 65–100)
GLUCOSE SERPL-MCNC: 87 MG/DL (ref 65–100)
GLUCOSE SERPL-MCNC: 90 MG/DL (ref 65–100)
GLUCOSE SERPL-MCNC: 93 MG/DL (ref 65–100)
GLUCOSE SERPL-MCNC: 93 MG/DL (ref 65–100)
GLUCOSE SERPL-MCNC: 96 MG/DL (ref 65–100)
GLUCOSE UR STRIP.AUTO-MCNC: ABNORMAL MG/DL
GLUCOSE UR STRIP.AUTO-MCNC: NEGATIVE MG/DL
GRAM STN SPEC: ABNORMAL
GRAM STN SPEC: NORMAL
HAPTOGLOB SERPL-MCNC: 282 MG/DL (ref 30–200)
HBA1C MFR BLD: 7.6 % (ref 4.8–6)
HBA1C MFR BLD: 9.6 % (ref 4.8–6)
HBV CORE AB SERPL QL IA: POSITIVE
HBV SURFACE AB SERPL IA-ACNC: 308.91 MIU/ML
HBV SURFACE AG SERPL QL IA: NEGATIVE
HCO3 BLD-SCNC: 18.2 MMOL/L (ref 22–26)
HCO3 BLD-SCNC: 19 MMOL/L (ref 22–26)
HCO3 BLD-SCNC: 20.3 MMOL/L (ref 22–26)
HCO3 BLD-SCNC: 20.6 MMOL/L (ref 22–26)
HCO3 BLD-SCNC: 20.8 MMOL/L (ref 22–26)
HCO3 BLD-SCNC: 21.5 MMOL/L (ref 22–26)
HCO3 BLD-SCNC: 21.6 MMOL/L (ref 22–26)
HCO3 BLD-SCNC: 21.7 MMOL/L (ref 22–26)
HCO3 BLD-SCNC: 22.2 MMOL/L (ref 22–26)
HCO3 BLD-SCNC: 22.4 MMOL/L (ref 22–26)
HCO3 BLD-SCNC: 22.6 MMOL/L (ref 22–26)
HCO3 BLD-SCNC: 23.1 MMOL/L (ref 22–26)
HCO3 BLD-SCNC: 23.1 MMOL/L (ref 22–26)
HCO3 BLD-SCNC: 25.6 MMOL/L (ref 22–26)
HCO3 BLD-SCNC: 25.9 MMOL/L (ref 22–26)
HCO3 BLD-SCNC: 26.1 MMOL/L (ref 22–26)
HCO3 BLD-SCNC: 26.2 MMOL/L (ref 22–26)
HCO3 BLD-SCNC: 26.3 MMOL/L (ref 22–26)
HCO3 BLD-SCNC: 27 MMOL/L (ref 22–26)
HCO3 BLD-SCNC: 27.1 MMOL/L (ref 22–26)
HCO3 BLD-SCNC: 27.4 MMOL/L (ref 22–26)
HCO3 BLD-SCNC: 27.5 MMOL/L (ref 22–26)
HCO3 BLD-SCNC: 28.9 MMOL/L (ref 22–26)
HCO3 BLD-SCNC: 29.5 MMOL/L (ref 22–26)
HCO3 BLD-SCNC: 29.9 MMOL/L (ref 22–26)
HCO3 BLD-SCNC: 30.2 MMOL/L (ref 22–26)
HCO3 BLD-SCNC: 30.3 MMOL/L (ref 22–26)
HCO3 BLD-SCNC: 30.7 MMOL/L (ref 22–26)
HCO3 BLD-SCNC: 30.8 MMOL/L (ref 22–26)
HCO3 BLD-SCNC: 31.1 MMOL/L (ref 22–26)
HCO3 BLD-SCNC: 31.3 MMOL/L (ref 22–26)
HCO3 BLD-SCNC: 32.5 MMOL/L (ref 22–26)
HCO3 BLDV-SCNC: 20.4 MMOL/L (ref 23–28)
HCO3 BLDV-SCNC: 21.5 MMOL/L (ref 23–28)
HCO3 BLDV-SCNC: 27.5 MMOL/L (ref 23–28)
HCO3 BLDV-SCNC: 28 MMOL/L (ref 23–28)
HCO3 BLDV-SCNC: 28.4 MMOL/L (ref 23–28)
HCO3 BLDV-SCNC: 29 MMOL/L (ref 23–28)
HCO3 BLDV-SCNC: 29.4 MMOL/L (ref 23–28)
HCO3 BLDV-SCNC: 29.5 MMOL/L (ref 23–28)
HCT VFR BLD AUTO: 22.6 % (ref 41.1–50.3)
HCT VFR BLD AUTO: 23.2 % (ref 41.1–50.3)
HCT VFR BLD AUTO: 23.3 % (ref 41.1–50.3)
HCT VFR BLD AUTO: 23.7 % (ref 41.1–50.3)
HCT VFR BLD AUTO: 23.8 % (ref 41.1–50.3)
HCT VFR BLD AUTO: 24.4 % (ref 41.1–50.3)
HCT VFR BLD AUTO: 24.6 % (ref 41.1–50.3)
HCT VFR BLD AUTO: 25 % (ref 41.1–50.3)
HCT VFR BLD AUTO: 25.1 % (ref 41.1–50.3)
HCT VFR BLD AUTO: 25.2 % (ref 41.1–50.3)
HCT VFR BLD AUTO: 25.4 % (ref 41.1–50.3)
HCT VFR BLD AUTO: 25.8 % (ref 41.1–50.3)
HCT VFR BLD AUTO: 25.9 % (ref 41.1–50.3)
HCT VFR BLD AUTO: 26 % (ref 41.1–50.3)
HCT VFR BLD AUTO: 26.1 % (ref 41.1–50.3)
HCT VFR BLD AUTO: 26.3 % (ref 41.1–50.3)
HCT VFR BLD AUTO: 26.4 % (ref 41.1–50.3)
HCT VFR BLD AUTO: 26.4 % (ref 41.1–50.3)
HCT VFR BLD AUTO: 26.6 % (ref 41.1–50.3)
HCT VFR BLD AUTO: 26.7 % (ref 41.1–50.3)
HCT VFR BLD AUTO: 26.8 % (ref 41.1–50.3)
HCT VFR BLD AUTO: 26.8 % (ref 41.1–50.3)
HCT VFR BLD AUTO: 26.9 % (ref 41.1–50.3)
HCT VFR BLD AUTO: 26.9 % (ref 41.1–50.3)
HCT VFR BLD AUTO: 27.2 % (ref 41.1–50.3)
HCT VFR BLD AUTO: 27.4 % (ref 41.1–50.3)
HCT VFR BLD AUTO: 27.4 % (ref 41.1–50.3)
HCT VFR BLD AUTO: 27.5 % (ref 41.1–50.3)
HCT VFR BLD AUTO: 27.6 % (ref 41.1–50.3)
HCT VFR BLD AUTO: 27.8 % (ref 41.1–50.3)
HCT VFR BLD AUTO: 27.9 % (ref 41.1–50.3)
HCT VFR BLD AUTO: 28.1 % (ref 41.1–50.3)
HCT VFR BLD AUTO: 28.2 % (ref 41.1–50.3)
HCT VFR BLD AUTO: 28.2 % (ref 41.1–50.3)
HCT VFR BLD AUTO: 28.3 % (ref 41.1–50.3)
HCT VFR BLD AUTO: 28.3 % (ref 41.1–50.3)
HCT VFR BLD AUTO: 28.5 % (ref 41.1–50.3)
HCT VFR BLD AUTO: 28.6 % (ref 41.1–50.3)
HCT VFR BLD AUTO: 28.6 % (ref 41.1–50.3)
HCT VFR BLD AUTO: 28.7 % (ref 41.1–50.3)
HCT VFR BLD AUTO: 28.8 % (ref 41.1–50.3)
HCT VFR BLD AUTO: 29 % (ref 41.1–50.3)
HCT VFR BLD AUTO: 29 % (ref 41.1–50.3)
HCT VFR BLD AUTO: 29.1 % (ref 41.1–50.3)
HCT VFR BLD AUTO: 29.1 % (ref 41.1–50.3)
HCT VFR BLD AUTO: 29.4 % (ref 41.1–50.3)
HCT VFR BLD AUTO: 29.5 % (ref 41.1–50.3)
HCT VFR BLD AUTO: 29.8 % (ref 41.1–50.3)
HCT VFR BLD AUTO: 29.8 % (ref 41.1–50.3)
HCT VFR BLD AUTO: 30 % (ref 41.1–50.3)
HCT VFR BLD AUTO: 30.2 % (ref 41.1–50.3)
HCT VFR BLD AUTO: 30.3 % (ref 41.1–50.3)
HCT VFR BLD AUTO: 30.3 % (ref 41.1–50.3)
HCT VFR BLD AUTO: 30.5 % (ref 41.1–50.3)
HCT VFR BLD AUTO: 30.6 % (ref 41.1–50.3)
HCT VFR BLD AUTO: 30.7 % (ref 41.1–50.3)
HCT VFR BLD AUTO: 30.9 % (ref 41.1–50.3)
HCT VFR BLD AUTO: 31 % (ref 41.1–50.3)
HCT VFR BLD AUTO: 31.2 % (ref 41.1–50.3)
HCT VFR BLD AUTO: 31.3 % (ref 41.1–50.3)
HCT VFR BLD AUTO: 31.5 % (ref 41.1–50.3)
HCT VFR BLD AUTO: 31.8 % (ref 41.1–50.3)
HCT VFR BLD AUTO: 31.9 % (ref 41.1–50.3)
HCT VFR BLD AUTO: 32 % (ref 41.1–50.3)
HCT VFR BLD AUTO: 32 % (ref 41.1–50.3)
HCT VFR BLD AUTO: 32.1 % (ref 41.1–50.3)
HCT VFR BLD AUTO: 32.1 % (ref 41.1–50.3)
HCT VFR BLD AUTO: 32.2 % (ref 41.1–50.3)
HCT VFR BLD AUTO: 32.4 % (ref 41.1–50.3)
HCT VFR BLD AUTO: 32.7 % (ref 41.1–50.3)
HCT VFR BLD AUTO: 32.7 % (ref 41.1–50.3)
HCT VFR BLD AUTO: 33 % (ref 41.1–50.3)
HCT VFR BLD AUTO: 33.3 % (ref 41.1–50.3)
HCT VFR BLD AUTO: 33.3 % (ref 41.1–50.3)
HCT VFR BLD AUTO: 33.4 % (ref 41.1–50.3)
HCT VFR BLD AUTO: 33.6 % (ref 41.1–50.3)
HCT VFR BLD AUTO: 34.3 % (ref 41.1–50.3)
HCT VFR BLD AUTO: 34.8 % (ref 41.1–50.3)
HCT VFR BLD AUTO: 35 % (ref 41.1–50.3)
HEPARIN INDUCED PLT,XHIPA: POSITIVE
HEPARIN INDUCED PLT,XHIPA: POSITIVE
HGB BLD-MCNC: 10 G/DL (ref 13.6–17.2)
HGB BLD-MCNC: 10.2 G/DL (ref 13.6–17.2)
HGB BLD-MCNC: 10.2 G/DL (ref 13.6–17.2)
HGB BLD-MCNC: 10.3 G/DL (ref 13.6–17.2)
HGB BLD-MCNC: 10.4 G/DL (ref 13.6–17.2)
HGB BLD-MCNC: 10.4 G/DL (ref 13.6–17.2)
HGB BLD-MCNC: 10.5 G/DL (ref 13.6–17.2)
HGB BLD-MCNC: 10.6 G/DL (ref 13.6–17.2)
HGB BLD-MCNC: 10.6 G/DL (ref 13.6–17.2)
HGB BLD-MCNC: 10.7 G/DL (ref 13.6–17.2)
HGB BLD-MCNC: 10.9 G/DL (ref 13.6–17.2)
HGB BLD-MCNC: 10.9 G/DL (ref 13.6–17.2)
HGB BLD-MCNC: 6.8 G/DL (ref 13.6–17.2)
HGB BLD-MCNC: 7 G/DL (ref 13.6–17.2)
HGB BLD-MCNC: 7.4 G/DL (ref 13.6–17.2)
HGB BLD-MCNC: 7.4 G/DL (ref 13.6–17.2)
HGB BLD-MCNC: 7.5 G/DL (ref 13.6–17.2)
HGB BLD-MCNC: 7.6 G/DL (ref 13.6–17.2)
HGB BLD-MCNC: 7.7 G/DL (ref 13.6–17.2)
HGB BLD-MCNC: 7.8 G/DL (ref 13.6–17.2)
HGB BLD-MCNC: 7.8 G/DL (ref 13.6–17.2)
HGB BLD-MCNC: 7.9 G/DL (ref 13.6–17.2)
HGB BLD-MCNC: 7.9 G/DL (ref 13.6–17.2)
HGB BLD-MCNC: 8 G/DL (ref 13.6–17.2)
HGB BLD-MCNC: 8.1 G/DL (ref 13.6–17.2)
HGB BLD-MCNC: 8.2 G/DL (ref 13.6–17.2)
HGB BLD-MCNC: 8.3 G/DL (ref 13.6–17.2)
HGB BLD-MCNC: 8.4 G/DL (ref 13.6–17.2)
HGB BLD-MCNC: 8.5 G/DL (ref 13.6–17.2)
HGB BLD-MCNC: 8.6 G/DL (ref 13.6–17.2)
HGB BLD-MCNC: 8.7 G/DL (ref 13.6–17.2)
HGB BLD-MCNC: 8.8 G/DL (ref 13.6–17.2)
HGB BLD-MCNC: 8.9 G/DL (ref 13.6–17.2)
HGB BLD-MCNC: 9 G/DL (ref 13.6–17.2)
HGB BLD-MCNC: 9 G/DL (ref 13.6–17.2)
HGB BLD-MCNC: 9.1 G/DL (ref 13.6–17.2)
HGB BLD-MCNC: 9.3 G/DL (ref 13.6–17.2)
HGB BLD-MCNC: 9.4 G/DL (ref 13.6–17.2)
HGB BLD-MCNC: 9.4 G/DL (ref 13.6–17.2)
HGB BLD-MCNC: 9.5 G/DL (ref 13.6–17.2)
HGB BLD-MCNC: 9.6 G/DL (ref 13.6–17.2)
HGB BLD-MCNC: 9.6 G/DL (ref 13.6–17.2)
HGB BLD-MCNC: 9.7 G/DL (ref 13.6–17.2)
HGB BLD-MCNC: 9.8 G/DL (ref 13.6–17.2)
HGB BLD-MCNC: 9.9 G/DL (ref 13.6–17.2)
HGB BLD-MCNC: 9.9 G/DL (ref 13.6–17.2)
HGB RETIC QN AUTO: 26 PG (ref 29–35)
HGB RETIC QN AUTO: 26 PG (ref 29–35)
HGB UR QL STRIP: ABNORMAL
HGB UR QL STRIP: ABNORMAL
HGB UR QL STRIP: NEGATIVE
HGB UR QL STRIP: NEGATIVE
HIT INTERPRETATION,XINTPR: ABNORMAL
HIT INTERPRETATION,XINTPR: POSITIVE
HIT PROFILE,XHITT: ABNORMAL
HIT PROFILE,XHITT: ABNORMAL
IMM GRANULOCYTES # BLD AUTO: 0 K/UL (ref 0–0.5)
IMM GRANULOCYTES # BLD AUTO: 0.1 K/UL (ref 0–0.5)
IMM GRANULOCYTES # BLD AUTO: 0.2 K/UL (ref 0–0.5)
IMM GRANULOCYTES # BLD AUTO: 0.3 K/UL (ref 0–0.5)
IMM GRANULOCYTES NFR BLD AUTO: 0 % (ref 0–5)
IMM GRANULOCYTES NFR BLD AUTO: 1 % (ref 0–5)
IMM GRANULOCYTES NFR BLD AUTO: 2 % (ref 0–5)
IMM RETICS NFR: 16.6 % (ref 2.3–13.4)
IMM RETICS NFR: 39.3 % (ref 2.3–13.4)
INR BLD: 5.1 (ref 0.9–1.2)
INR PPP: 0.9
INR PPP: 1.2
INR PPP: 1.3
INR PPP: 1.4
INR PPP: 1.4
INR PPP: 1.6
INR PPP: 1.7
INR PPP: 1.8
INR PPP: 1.9
INR PPP: 2
INR PPP: 2.1
INR PPP: 2.2
INR PPP: 2.3
INR PPP: 2.4
INR PPP: 2.5
INR PPP: 2.6
INR PPP: 2.7
INR PPP: 2.8
INR PPP: 2.9
INR PPP: 3
INR PPP: 3.1
INR PPP: 3.1
INR PPP: 3.2
INR PPP: 3.3
INR PPP: 3.3
INR PPP: 3.4
INR PPP: 3.5
INR PPP: 3.7
INR PPP: 3.7
INR PPP: 3.8
INR PPP: 3.9
INR PPP: 4.1
INR PPP: 4.1
INR PPP: 4.2
INR PPP: 4.3
INR PPP: 4.4
INR PPP: 4.8
INR PPP: 5
INR PPP: 6.3
INR PPP: 7.4
INR PPP: 7.7
INR PPP: >9
INR PPP: >9
INSPIRATION.DURATION SETTING TIME VENT: 0.79 SEC
INSPIRATION.DURATION SETTING TIME VENT: 0.9 SEC
INSPIRATION.DURATION SETTING TIME VENT: 0.93 SEC
INSPIRATION.DURATION SETTING TIME VENT: 0.99 SEC
INSPIRATION.DURATION SETTING TIME VENT: 1.33 SEC
INTERPRETATION: ABNORMAL
INTERPRETATION: ABNORMAL
IRON SATN MFR SERPL: 16 %
IRON SATN MFR SERPL: 17 %
IRON SATN MFR SERPL: 17 %
IRON SERPL-MCNC: 22 UG/DL (ref 35–150)
IRON SERPL-MCNC: 24 UG/DL (ref 35–150)
IRON SERPL-MCNC: 27 UG/DL (ref 35–150)
KETONES UR QL STRIP.AUTO: ABNORMAL MG/DL
KETONES UR QL STRIP.AUTO: NEGATIVE MG/DL
LACTATE SERPL-SCNC: 1.5 MMOL/L (ref 0.4–2)
LACTATE SERPL-SCNC: 1.6 MMOL/L (ref 0.4–2)
LACTATE SERPL-SCNC: 2 MMOL/L (ref 0.4–2)
LACTATE SERPL-SCNC: 3 MMOL/L (ref 0.4–2)
LDH SERPL L TO P-CCNC: 353 U/L (ref 110–210)
LEUKOCYTE ESTERASE UR QL STRIP.AUTO: ABNORMAL
LEUKOCYTE ESTERASE UR QL STRIP.AUTO: NEGATIVE
LYMPHOCYTES # BLD: 0.3 K/UL (ref 0.5–4.6)
LYMPHOCYTES # BLD: 0.7 K/UL (ref 0.5–4.6)
LYMPHOCYTES # BLD: 0.9 K/UL (ref 0.5–4.6)
LYMPHOCYTES # BLD: 1 K/UL (ref 0.5–4.6)
LYMPHOCYTES # BLD: 1.2 K/UL (ref 0.5–4.6)
LYMPHOCYTES # BLD: 1.4 K/UL (ref 0.5–4.6)
LYMPHOCYTES # BLD: 1.5 K/UL (ref 0.5–4.6)
LYMPHOCYTES # BLD: 1.6 K/UL (ref 0.5–4.6)
LYMPHOCYTES # BLD: 1.6 K/UL (ref 0.5–4.6)
LYMPHOCYTES # BLD: 2.1 K/UL (ref 0.5–4.6)
LYMPHOCYTES # BLD: 2.2 K/UL (ref 0.5–4.6)
LYMPHOCYTES # BLD: 2.3 K/UL (ref 0.5–4.6)
LYMPHOCYTES # BLD: 2.3 K/UL (ref 0.5–4.6)
LYMPHOCYTES # BLD: 2.4 K/UL (ref 0.5–4.6)
LYMPHOCYTES # BLD: 2.4 K/UL (ref 0.5–4.6)
LYMPHOCYTES # BLD: 2.5 K/UL (ref 0.5–4.6)
LYMPHOCYTES # BLD: 2.8 K/UL (ref 0.5–4.6)
LYMPHOCYTES # BLD: 2.9 K/UL (ref 0.5–4.6)
LYMPHOCYTES # BLD: 2.9 K/UL (ref 0.5–4.6)
LYMPHOCYTES # BLD: 3.1 K/UL (ref 0.5–4.6)
LYMPHOCYTES NFR BLD: 12 % (ref 13–44)
LYMPHOCYTES NFR BLD: 14 % (ref 13–44)
LYMPHOCYTES NFR BLD: 14 % (ref 13–44)
LYMPHOCYTES NFR BLD: 17 % (ref 13–44)
LYMPHOCYTES NFR BLD: 18 % (ref 13–44)
LYMPHOCYTES NFR BLD: 19 % (ref 13–44)
LYMPHOCYTES NFR BLD: 2 % (ref 13–44)
LYMPHOCYTES NFR BLD: 20 % (ref 13–44)
LYMPHOCYTES NFR BLD: 21 % (ref 13–44)
LYMPHOCYTES NFR BLD: 22 % (ref 13–44)
LYMPHOCYTES NFR BLD: 22 % (ref 13–44)
LYMPHOCYTES NFR BLD: 27 % (ref 13–44)
LYMPHOCYTES NFR BLD: 28 % (ref 13–44)
LYMPHOCYTES NFR BLD: 3 % (ref 13–44)
LYMPHOCYTES NFR BLD: 30 % (ref 13–44)
LYMPHOCYTES NFR BLD: 31 % (ref 13–44)
LYMPHOCYTES NFR BLD: 31 % (ref 13–44)
LYMPHOCYTES NFR BLD: 32 % (ref 13–44)
LYMPHOCYTES NFR BLD: 4 % (ref 13–44)
LYMPHOCYTES NFR BLD: 7 % (ref 13–44)
LYMPHOCYTES NFR BLD: 8 % (ref 13–44)
LYMPHOCYTES NFR BLD: 8 % (ref 13–44)
LYMPHOCYTES NFR BRONCH MANUAL: 20 %
MACROPHAGES NFR BRONCH MANUAL: 0 %
MAGNESIUM SERPL-MCNC: 1.6 MG/DL (ref 1.8–2.4)
MAGNESIUM SERPL-MCNC: 1.7 MG/DL (ref 1.8–2.4)
MAGNESIUM SERPL-MCNC: 1.8 MG/DL (ref 1.8–2.4)
MAGNESIUM SERPL-MCNC: 1.9 MG/DL (ref 1.8–2.4)
MAGNESIUM SERPL-MCNC: 1.9 MG/DL (ref 1.8–2.4)
MAGNESIUM SERPL-MCNC: 2 MG/DL (ref 1.8–2.4)
MAGNESIUM SERPL-MCNC: 2.1 MG/DL (ref 1.8–2.4)
MAGNESIUM SERPL-MCNC: 2.1 MG/DL (ref 1.8–2.4)
MAGNESIUM SERPL-MCNC: 2.2 MG/DL (ref 1.8–2.4)
MAGNESIUM SERPL-MCNC: 2.3 MG/DL (ref 1.8–2.4)
MAGNESIUM SERPL-MCNC: 2.4 MG/DL (ref 1.8–2.4)
MAGNESIUM SERPL-MCNC: 2.5 MG/DL (ref 1.8–2.4)
MAGNESIUM SERPL-MCNC: 2.6 MG/DL (ref 1.8–2.4)
MCH RBC QN AUTO: 26.7 PG (ref 26.1–32.9)
MCH RBC QN AUTO: 27 PG (ref 26.1–32.9)
MCH RBC QN AUTO: 27 PG (ref 26.1–32.9)
MCH RBC QN AUTO: 27.2 PG (ref 26.1–32.9)
MCH RBC QN AUTO: 27.3 PG (ref 26.1–32.9)
MCH RBC QN AUTO: 27.4 PG (ref 26.1–32.9)
MCH RBC QN AUTO: 27.4 PG (ref 26.1–32.9)
MCH RBC QN AUTO: 27.5 PG (ref 26.1–32.9)
MCH RBC QN AUTO: 27.6 PG (ref 26.1–32.9)
MCH RBC QN AUTO: 28 PG (ref 26.1–32.9)
MCH RBC QN AUTO: 28 PG (ref 26.1–32.9)
MCH RBC QN AUTO: 28.2 PG (ref 26.1–32.9)
MCH RBC QN AUTO: 28.3 PG (ref 26.1–32.9)
MCH RBC QN AUTO: 28.3 PG (ref 26.1–32.9)
MCH RBC QN AUTO: 28.4 PG (ref 26.1–32.9)
MCH RBC QN AUTO: 28.4 PG (ref 26.1–32.9)
MCH RBC QN AUTO: 28.5 PG (ref 26.1–32.9)
MCH RBC QN AUTO: 28.5 PG (ref 26.1–32.9)
MCH RBC QN AUTO: 28.6 PG (ref 26.1–32.9)
MCH RBC QN AUTO: 28.6 PG (ref 26.1–32.9)
MCH RBC QN AUTO: 28.7 PG (ref 26.1–32.9)
MCH RBC QN AUTO: 28.7 PG (ref 26.1–32.9)
MCH RBC QN AUTO: 28.8 PG (ref 26.1–32.9)
MCH RBC QN AUTO: 28.8 PG (ref 26.1–32.9)
MCH RBC QN AUTO: 28.9 PG (ref 26.1–32.9)
MCH RBC QN AUTO: 29 PG (ref 26.1–32.9)
MCH RBC QN AUTO: 29.1 PG (ref 26.1–32.9)
MCH RBC QN AUTO: 29.2 PG (ref 26.1–32.9)
MCH RBC QN AUTO: 29.3 PG (ref 26.1–32.9)
MCH RBC QN AUTO: 29.3 PG (ref 26.1–32.9)
MCH RBC QN AUTO: 29.4 PG (ref 26.1–32.9)
MCH RBC QN AUTO: 29.4 PG (ref 26.1–32.9)
MCH RBC QN AUTO: 29.5 PG (ref 26.1–32.9)
MCH RBC QN AUTO: 29.5 PG (ref 26.1–32.9)
MCH RBC QN AUTO: 29.7 PG (ref 26.1–32.9)
MCH RBC QN AUTO: 29.9 PG (ref 26.1–32.9)
MCH RBC QN AUTO: 29.9 PG (ref 26.1–32.9)
MCH RBC QN AUTO: 30 PG (ref 26.1–32.9)
MCH RBC QN AUTO: 30.1 PG (ref 26.1–32.9)
MCH RBC QN AUTO: 30.3 PG (ref 26.1–32.9)
MCH RBC QN AUTO: 30.3 PG (ref 26.1–32.9)
MCH RBC QN AUTO: 30.4 PG (ref 26.1–32.9)
MCH RBC QN AUTO: 30.5 PG (ref 26.1–32.9)
MCH RBC QN AUTO: 30.7 PG (ref 26.1–32.9)
MCH RBC QN AUTO: 31.1 PG (ref 26.1–32.9)
MCH RBC QN AUTO: 31.1 PG (ref 26.1–32.9)
MCH RBC QN AUTO: 31.2 PG (ref 26.1–32.9)
MCH RBC QN AUTO: 31.3 PG (ref 26.1–32.9)
MCH RBC QN AUTO: 31.3 PG (ref 26.1–32.9)
MCH RBC QN AUTO: 31.5 PG (ref 26.1–32.9)
MCH RBC QN AUTO: 31.6 PG (ref 26.1–32.9)
MCH RBC QN AUTO: 31.9 PG (ref 26.1–32.9)
MCH RBC QN AUTO: 32 PG (ref 26.1–32.9)
MCH RBC QN AUTO: 32.2 PG (ref 26.1–32.9)
MCHC RBC AUTO-ENTMCNC: 29.2 G/DL (ref 31.4–35)
MCHC RBC AUTO-ENTMCNC: 29.5 G/DL (ref 31.4–35)
MCHC RBC AUTO-ENTMCNC: 29.9 G/DL (ref 31.4–35)
MCHC RBC AUTO-ENTMCNC: 30 G/DL (ref 31.4–35)
MCHC RBC AUTO-ENTMCNC: 30.1 G/DL (ref 31.4–35)
MCHC RBC AUTO-ENTMCNC: 30.1 G/DL (ref 31.4–35)
MCHC RBC AUTO-ENTMCNC: 30.2 G/DL (ref 31.4–35)
MCHC RBC AUTO-ENTMCNC: 30.2 G/DL (ref 31.4–35)
MCHC RBC AUTO-ENTMCNC: 30.3 G/DL (ref 31.4–35)
MCHC RBC AUTO-ENTMCNC: 30.3 G/DL (ref 31.4–35)
MCHC RBC AUTO-ENTMCNC: 30.4 G/DL (ref 31.4–35)
MCHC RBC AUTO-ENTMCNC: 30.5 G/DL (ref 31.4–35)
MCHC RBC AUTO-ENTMCNC: 30.6 G/DL (ref 31.4–35)
MCHC RBC AUTO-ENTMCNC: 30.6 G/DL (ref 31.4–35)
MCHC RBC AUTO-ENTMCNC: 30.7 G/DL (ref 31.4–35)
MCHC RBC AUTO-ENTMCNC: 30.8 G/DL (ref 31.4–35)
MCHC RBC AUTO-ENTMCNC: 30.9 G/DL (ref 31.4–35)
MCHC RBC AUTO-ENTMCNC: 31 G/DL (ref 31.4–35)
MCHC RBC AUTO-ENTMCNC: 31.1 G/DL (ref 31.4–35)
MCHC RBC AUTO-ENTMCNC: 31.2 G/DL (ref 31.4–35)
MCHC RBC AUTO-ENTMCNC: 31.3 G/DL (ref 31.4–35)
MCHC RBC AUTO-ENTMCNC: 31.4 G/DL (ref 31.4–35)
MCHC RBC AUTO-ENTMCNC: 31.5 G/DL (ref 31.4–35)
MCHC RBC AUTO-ENTMCNC: 31.6 G/DL (ref 31.4–35)
MCHC RBC AUTO-ENTMCNC: 31.7 G/DL (ref 31.4–35)
MCHC RBC AUTO-ENTMCNC: 31.8 G/DL (ref 31.4–35)
MCHC RBC AUTO-ENTMCNC: 31.8 G/DL (ref 31.4–35)
MCHC RBC AUTO-ENTMCNC: 31.9 G/DL (ref 31.4–35)
MCHC RBC AUTO-ENTMCNC: 32 G/DL (ref 31.4–35)
MCHC RBC AUTO-ENTMCNC: 32.1 G/DL (ref 31.4–35)
MCHC RBC AUTO-ENTMCNC: 32.4 G/DL (ref 31.4–35)
MCHC RBC AUTO-ENTMCNC: 32.6 G/DL (ref 31.4–35)
MCHC RBC AUTO-ENTMCNC: 32.7 G/DL (ref 31.4–35)
MCV RBC AUTO: 100.4 FL (ref 79.6–97.8)
MCV RBC AUTO: 102.2 FL (ref 79.6–97.8)
MCV RBC AUTO: 87.5 FL (ref 79.6–97.8)
MCV RBC AUTO: 87.9 FL (ref 79.6–97.8)
MCV RBC AUTO: 88.2 FL (ref 79.6–97.8)
MCV RBC AUTO: 88.6 FL (ref 79.6–97.8)
MCV RBC AUTO: 89.1 FL (ref 79.6–97.8)
MCV RBC AUTO: 89.5 FL (ref 79.6–97.8)
MCV RBC AUTO: 89.6 FL (ref 79.6–97.8)
MCV RBC AUTO: 89.8 FL (ref 79.6–97.8)
MCV RBC AUTO: 89.8 FL (ref 79.6–97.8)
MCV RBC AUTO: 89.9 FL (ref 79.6–97.8)
MCV RBC AUTO: 90 FL (ref 79.6–97.8)
MCV RBC AUTO: 90.6 FL (ref 79.6–97.8)
MCV RBC AUTO: 90.9 FL (ref 79.6–97.8)
MCV RBC AUTO: 91.4 FL (ref 79.6–97.8)
MCV RBC AUTO: 91.6 FL (ref 79.6–97.8)
MCV RBC AUTO: 91.6 FL (ref 79.6–97.8)
MCV RBC AUTO: 91.8 FL (ref 79.6–97.8)
MCV RBC AUTO: 92 FL (ref 79.6–97.8)
MCV RBC AUTO: 92.3 FL (ref 79.6–97.8)
MCV RBC AUTO: 92.4 FL (ref 79.6–97.8)
MCV RBC AUTO: 92.5 FL (ref 79.6–97.8)
MCV RBC AUTO: 92.6 FL (ref 79.6–97.8)
MCV RBC AUTO: 92.8 FL (ref 79.6–97.8)
MCV RBC AUTO: 92.8 FL (ref 79.6–97.8)
MCV RBC AUTO: 93 FL (ref 79.6–97.8)
MCV RBC AUTO: 93 FL (ref 79.6–97.8)
MCV RBC AUTO: 93.1 FL (ref 79.6–97.8)
MCV RBC AUTO: 93.2 FL (ref 79.6–97.8)
MCV RBC AUTO: 93.3 FL (ref 79.6–97.8)
MCV RBC AUTO: 93.3 FL (ref 79.6–97.8)
MCV RBC AUTO: 93.4 FL (ref 79.6–97.8)
MCV RBC AUTO: 93.5 FL (ref 79.6–97.8)
MCV RBC AUTO: 93.8 FL (ref 79.6–97.8)
MCV RBC AUTO: 93.8 FL (ref 79.6–97.8)
MCV RBC AUTO: 93.9 FL (ref 79.6–97.8)
MCV RBC AUTO: 94.1 FL (ref 79.6–97.8)
MCV RBC AUTO: 94.1 FL (ref 79.6–97.8)
MCV RBC AUTO: 94.2 FL (ref 79.6–97.8)
MCV RBC AUTO: 94.3 FL (ref 79.6–97.8)
MCV RBC AUTO: 94.5 FL (ref 79.6–97.8)
MCV RBC AUTO: 94.5 FL (ref 79.6–97.8)
MCV RBC AUTO: 94.6 FL (ref 79.6–97.8)
MCV RBC AUTO: 94.7 FL (ref 79.6–97.8)
MCV RBC AUTO: 94.7 FL (ref 79.6–97.8)
MCV RBC AUTO: 95.2 FL (ref 79.6–97.8)
MCV RBC AUTO: 95.3 FL (ref 79.6–97.8)
MCV RBC AUTO: 95.5 FL (ref 79.6–97.8)
MCV RBC AUTO: 95.5 FL (ref 79.6–97.8)
MCV RBC AUTO: 95.7 FL (ref 79.6–97.8)
MCV RBC AUTO: 95.8 FL (ref 79.6–97.8)
MCV RBC AUTO: 96.1 FL (ref 79.6–97.8)
MCV RBC AUTO: 96.7 FL (ref 79.6–97.8)
MCV RBC AUTO: 97 FL (ref 79.6–97.8)
MCV RBC AUTO: 97.1 FL (ref 79.6–97.8)
MCV RBC AUTO: 97.1 FL (ref 79.6–97.8)
MCV RBC AUTO: 97.2 FL (ref 79.6–97.8)
MCV RBC AUTO: 97.4 FL (ref 79.6–97.8)
MCV RBC AUTO: 97.4 FL (ref 79.6–97.8)
MCV RBC AUTO: 97.5 FL (ref 79.6–97.8)
MCV RBC AUTO: 97.6 FL (ref 79.6–97.8)
MCV RBC AUTO: 97.7 FL (ref 79.6–97.8)
MCV RBC AUTO: 97.8 FL (ref 79.6–97.8)
MCV RBC AUTO: 97.9 FL (ref 79.6–97.8)
MCV RBC AUTO: 98 FL (ref 79.6–97.8)
MCV RBC AUTO: 98 FL (ref 79.6–97.8)
MCV RBC AUTO: 98.2 FL (ref 79.6–97.8)
MCV RBC AUTO: 98.2 FL (ref 79.6–97.8)
MCV RBC AUTO: 98.7 FL (ref 79.6–97.8)
MCV RBC AUTO: 98.8 FL (ref 79.6–97.8)
MCV RBC AUTO: 99 FL (ref 79.6–97.8)
MCV RBC AUTO: 99.2 FL (ref 79.6–97.8)
MCV RBC AUTO: 99.7 FL (ref 79.6–97.8)
MECA (METHICILLIN-RESISTANCE GENES), MRGP: DETECTED
MM INDURATION POC: 0 MM (ref 0–5)
MONOCYTES # BLD: 0.4 K/UL (ref 0.1–1.3)
MONOCYTES # BLD: 0.5 K/UL (ref 0.1–1.3)
MONOCYTES # BLD: 0.6 K/UL (ref 0.1–1.3)
MONOCYTES # BLD: 0.7 K/UL (ref 0.1–1.3)
MONOCYTES # BLD: 0.8 K/UL (ref 0.1–1.3)
MONOCYTES # BLD: 1 K/UL (ref 0.1–1.3)
MONOCYTES # BLD: 1 K/UL (ref 0.1–1.3)
MONOCYTES # BLD: 1.1 K/UL (ref 0.1–1.3)
MONOCYTES # BLD: 1.8 K/UL (ref 0.1–1.3)
MONOCYTES NFR BLD: 10 % (ref 4–12)
MONOCYTES NFR BLD: 3 % (ref 4–12)
MONOCYTES NFR BLD: 3 % (ref 4–12)
MONOCYTES NFR BLD: 4 % (ref 4–12)
MONOCYTES NFR BLD: 5 % (ref 4–12)
MONOCYTES NFR BLD: 5 % (ref 4–12)
MONOCYTES NFR BLD: 6 % (ref 4–12)
MONOCYTES NFR BLD: 7 % (ref 4–12)
MONOCYTES NFR BLD: 8 % (ref 4–12)
MONOCYTES NFR BLD: 9 % (ref 4–12)
MUCOUS THREADS URNS QL MICRO: 0 /LPF
NEUTROPHILS NFR BRONCH MANUAL: 80 %
NEUTS SEG # BLD: 11.2 K/UL (ref 1.7–8.2)
NEUTS SEG # BLD: 11.3 K/UL (ref 1.7–8.2)
NEUTS SEG # BLD: 14.5 K/UL (ref 1.7–8.2)
NEUTS SEG # BLD: 16.8 K/UL (ref 1.7–8.2)
NEUTS SEG # BLD: 18.4 K/UL (ref 1.7–8.2)
NEUTS SEG # BLD: 24.2 K/UL (ref 1.7–8.2)
NEUTS SEG # BLD: 4.1 K/UL (ref 1.7–8.2)
NEUTS SEG # BLD: 4.3 K/UL (ref 1.7–8.2)
NEUTS SEG # BLD: 4.5 K/UL (ref 1.7–8.2)
NEUTS SEG # BLD: 4.5 K/UL (ref 1.7–8.2)
NEUTS SEG # BLD: 4.9 K/UL (ref 1.7–8.2)
NEUTS SEG # BLD: 5.2 K/UL (ref 1.7–8.2)
NEUTS SEG # BLD: 5.2 K/UL (ref 1.7–8.2)
NEUTS SEG # BLD: 5.6 K/UL (ref 1.7–8.2)
NEUTS SEG # BLD: 6.4 K/UL (ref 1.7–8.2)
NEUTS SEG # BLD: 6.8 K/UL (ref 1.7–8.2)
NEUTS SEG # BLD: 6.8 K/UL (ref 1.7–8.2)
NEUTS SEG # BLD: 7 K/UL (ref 1.7–8.2)
NEUTS SEG # BLD: 7.1 K/UL (ref 1.7–8.2)
NEUTS SEG # BLD: 7.2 K/UL (ref 1.7–8.2)
NEUTS SEG # BLD: 7.4 K/UL (ref 1.7–8.2)
NEUTS SEG # BLD: 7.6 K/UL (ref 1.7–8.2)
NEUTS SEG # BLD: 7.6 K/UL (ref 1.7–8.2)
NEUTS SEG # BLD: 7.7 K/UL (ref 1.7–8.2)
NEUTS SEG # BLD: 7.7 K/UL (ref 1.7–8.2)
NEUTS SEG # BLD: 8.1 K/UL (ref 1.7–8.2)
NEUTS SEG # BLD: 9.6 K/UL (ref 1.7–8.2)
NEUTS SEG NFR BLD: 55 % (ref 43–78)
NEUTS SEG NFR BLD: 56 % (ref 43–78)
NEUTS SEG NFR BLD: 61 % (ref 43–78)
NEUTS SEG NFR BLD: 62 % (ref 43–78)
NEUTS SEG NFR BLD: 64 % (ref 43–78)
NEUTS SEG NFR BLD: 65 % (ref 43–78)
NEUTS SEG NFR BLD: 67 % (ref 43–78)
NEUTS SEG NFR BLD: 68 % (ref 43–78)
NEUTS SEG NFR BLD: 69 % (ref 43–78)
NEUTS SEG NFR BLD: 71 % (ref 43–78)
NEUTS SEG NFR BLD: 71 % (ref 43–78)
NEUTS SEG NFR BLD: 72 % (ref 43–78)
NEUTS SEG NFR BLD: 73 % (ref 43–78)
NEUTS SEG NFR BLD: 74 % (ref 43–78)
NEUTS SEG NFR BLD: 75 % (ref 43–78)
NEUTS SEG NFR BLD: 75 % (ref 43–78)
NEUTS SEG NFR BLD: 77 % (ref 43–78)
NEUTS SEG NFR BLD: 81 % (ref 43–78)
NEUTS SEG NFR BLD: 82 % (ref 43–78)
NEUTS SEG NFR BLD: 87 % (ref 43–78)
NEUTS SEG NFR BLD: 88 % (ref 43–78)
NEUTS SEG NFR BLD: 92 % (ref 43–78)
NEUTS SEG NFR BLD: 93 % (ref 43–78)
NITRITE UR QL STRIP.AUTO: ABNORMAL
NITRITE UR QL STRIP.AUTO: NEGATIVE
NRBC # BLD: 0 K/UL (ref 0–0.2)
NRBC # BLD: 0.02 K/UL (ref 0–0.2)
NRBC # BLD: 0.03 K/UL (ref 0–0.2)
NRBC # BLD: 0.03 K/UL (ref 0–0.2)
NRBC # BLD: 0.05 K/UL (ref 0–0.2)
NRBC # BLD: 0.05 K/UL (ref 0–0.2)
NRBC # BLD: 0.07 K/UL (ref 0–0.2)
NRBC # BLD: 0.09 K/UL (ref 0–0.2)
O2/TOTAL GAS SETTING VFR VENT: 100 %
O2/TOTAL GAS SETTING VFR VENT: 40 %
O2/TOTAL GAS SETTING VFR VENT: 45 %
O2/TOTAL GAS SETTING VFR VENT: 50 %
O2/TOTAL GAS SETTING VFR VENT: 60 %
O2/TOTAL GAS SETTING VFR VENT: 70 %
O2/TOTAL GAS SETTING VFR VENT: 75 %
O2/TOTAL GAS SETTING VFR VENT: 75 %
O2/TOTAL GAS SETTING VFR VENT: 80 %
OPTICAL DENSITY READ,XHITAO: 0.44 ABS
OPTICAL DENSITY READ,XHITAO: 3.14 ABS
P-R INTERVAL, ECG05: 160 MS
P-R INTERVAL, ECG05: 170 MS
P-R INTERVAL, ECG05: 172 MS
P-R INTERVAL, ECG05: 180 MS
P-R INTERVAL, ECG05: 188 MS
P-R INTERVAL, ECG05: 198 MS
PATH REV BLD -IMP: NORMAL
PCO2 BLD: 30.3 MMHG (ref 35–45)
PCO2 BLD: 34.9 MMHG (ref 35–45)
PCO2 BLD: 35.5 MMHG (ref 35–45)
PCO2 BLD: 35.5 MMHG (ref 35–45)
PCO2 BLD: 36 MMHG (ref 35–45)
PCO2 BLD: 36.4 MMHG (ref 35–45)
PCO2 BLD: 37.5 MMHG (ref 35–45)
PCO2 BLD: 38.1 MMHG (ref 35–45)
PCO2 BLD: 39.4 MMHG (ref 35–45)
PCO2 BLD: 39.9 MMHG (ref 35–45)
PCO2 BLD: 40.3 MMHG (ref 35–45)
PCO2 BLD: 41 MMHG (ref 35–45)
PCO2 BLD: 41.3 MMHG (ref 35–45)
PCO2 BLD: 41.9 MMHG (ref 35–45)
PCO2 BLD: 42.2 MMHG (ref 35–45)
PCO2 BLD: 42.7 MMHG (ref 35–45)
PCO2 BLD: 44.1 MMHG (ref 35–45)
PCO2 BLD: 44.1 MMHG (ref 35–45)
PCO2 BLD: 44.4 MMHG (ref 35–45)
PCO2 BLD: 44.9 MMHG (ref 35–45)
PCO2 BLD: 45.9 MMHG (ref 35–45)
PCO2 BLD: 46.3 MMHG (ref 35–45)
PCO2 BLD: 46.6 MMHG (ref 35–45)
PCO2 BLD: 46.9 MMHG (ref 35–45)
PCO2 BLD: 47 MMHG (ref 35–45)
PCO2 BLD: 47.2 MMHG (ref 35–45)
PCO2 BLD: 47.5 MMHG (ref 35–45)
PCO2 BLD: 48.1 MMHG (ref 35–45)
PCO2 BLD: 49.5 MMHG (ref 35–45)
PCO2 BLD: 49.9 MMHG (ref 35–45)
PCO2 BLD: 50 MMHG (ref 35–45)
PCO2 BLD: 53.5 MMHG (ref 35–45)
PCO2 BLD: 55.4 MMHG (ref 35–45)
PCO2 BLD: 55.5 MMHG (ref 35–45)
PCO2 BLD: 57.3 MMHG (ref 35–45)
PCO2 BLD: 59 MMHG (ref 35–45)
PCO2 BLDV: 43.7 MMHG (ref 41–51)
PCO2 BLDV: 43.7 MMHG (ref 41–51)
PCO2 BLDV: 44.3 MMHG (ref 41–51)
PCO2 BLDV: 45.7 MMHG (ref 41–51)
PCO2 BLDV: 47.9 MMHG (ref 41–51)
PCO2 BLDV: 48.2 MMHG (ref 41–51)
PCO2 BLDV: 48.7 MMHG (ref 41–51)
PCO2 BLDV: 67.3 MMHG (ref 41–51)
PEEP RESPIRATORY: 10 CMH2O
PEEP RESPIRATORY: 12 CMH2O
PEEP RESPIRATORY: 14 CMH2O
PEEP RESPIRATORY: 8 CMH2O
PH BLD: 7.17 [PH] (ref 7.35–7.45)
PH BLD: 7.21 [PH] (ref 7.35–7.45)
PH BLD: 7.23 [PH] (ref 7.35–7.45)
PH BLD: 7.24 [PH] (ref 7.35–7.45)
PH BLD: 7.26 [PH] (ref 7.35–7.45)
PH BLD: 7.27 [PH] (ref 7.35–7.45)
PH BLD: 7.3 [PH] (ref 7.35–7.45)
PH BLD: 7.31 [PH] (ref 7.35–7.45)
PH BLD: 7.32 [PH] (ref 7.35–7.45)
PH BLD: 7.32 [PH] (ref 7.35–7.45)
PH BLD: 7.33 [PH] (ref 7.35–7.45)
PH BLD: 7.34 [PH] (ref 7.35–7.45)
PH BLD: 7.36 [PH] (ref 7.35–7.45)
PH BLD: 7.37 [PH] (ref 7.35–7.45)
PH BLD: 7.39 [PH] (ref 7.35–7.45)
PH BLD: 7.4 [PH] (ref 7.35–7.45)
PH BLD: 7.4 [PH] (ref 7.35–7.45)
PH BLD: 7.41 [PH] (ref 7.35–7.45)
PH BLD: 7.42 [PH] (ref 7.35–7.45)
PH BLD: 7.42 [PH] (ref 7.35–7.45)
PH BLD: 7.43 [PH] (ref 7.35–7.45)
PH BLD: 7.44 [PH] (ref 7.35–7.45)
PH BLD: 7.44 [PH] (ref 7.35–7.45)
PH BLD: 7.45 [PH] (ref 7.35–7.45)
PH BLD: 7.48 [PH] (ref 7.35–7.45)
PH BLD: 7.5 [PH] (ref 7.35–7.45)
PH BLD: 7.54 [PH] (ref 7.35–7.45)
PH BLD: 7.58 [PH] (ref 7.35–7.45)
PH BLDV: 7.25 [PH] (ref 7.32–7.42)
PH BLDV: 7.26 [PH] (ref 7.32–7.42)
PH BLDV: 7.28 [PH] (ref 7.32–7.42)
PH BLDV: 7.38 [PH] (ref 7.32–7.42)
PH BLDV: 7.39 [PH] (ref 7.32–7.42)
PH BLDV: 7.39 [PH] (ref 7.32–7.42)
PH BLDV: 7.42 [PH] (ref 7.32–7.42)
PH BLDV: 7.42 [PH] (ref 7.32–7.42)
PH UR STRIP: 5.5 [PH] (ref 5–9)
PH UR STRIP: 5.5 [PH] (ref 5–9)
PH UR STRIP: 7 [PH] (ref 5–9)
PH UR STRIP: ABNORMAL [PH] (ref 5–8)
PHOSPHATE SERPL-MCNC: 2.4 MG/DL (ref 2.3–3.7)
PHOSPHATE SERPL-MCNC: 2.4 MG/DL (ref 2.3–3.7)
PHOSPHATE SERPL-MCNC: 2.6 MG/DL (ref 2.3–3.7)
PHOSPHATE SERPL-MCNC: 3.1 MG/DL (ref 2.3–3.7)
PHOSPHATE SERPL-MCNC: 3.2 MG/DL (ref 2.3–3.7)
PHOSPHATE SERPL-MCNC: 3.2 MG/DL (ref 2.3–3.7)
PHOSPHATE SERPL-MCNC: 3.3 MG/DL (ref 2.3–3.7)
PHOSPHATE SERPL-MCNC: 3.5 MG/DL (ref 2.3–3.7)
PHOSPHATE SERPL-MCNC: 3.5 MG/DL (ref 2.3–3.7)
PHOSPHATE SERPL-MCNC: 3.6 MG/DL (ref 2.3–3.7)
PHOSPHATE SERPL-MCNC: 3.8 MG/DL (ref 2.3–3.7)
PHOSPHATE SERPL-MCNC: 3.8 MG/DL (ref 2.3–3.7)
PHOSPHATE SERPL-MCNC: 4.2 MG/DL (ref 2.3–3.7)
PHOSPHATE SERPL-MCNC: 4.4 MG/DL (ref 2.3–3.7)
PHOSPHATE SERPL-MCNC: 4.7 MG/DL (ref 2.3–3.7)
PHOSPHATE SERPL-MCNC: 4.7 MG/DL (ref 2.3–3.7)
PHOSPHATE SERPL-MCNC: 5.1 MG/DL (ref 2.3–3.7)
PHOSPHATE SERPL-MCNC: 5.4 MG/DL (ref 2.3–3.7)
PHOSPHATE SERPL-MCNC: 5.5 MG/DL (ref 2.3–3.7)
PHOSPHATE SERPL-MCNC: 5.6 MG/DL (ref 2.3–3.7)
PHOSPHATE SERPL-MCNC: 6.2 MG/DL (ref 2.3–3.7)
PLATELET # BLD AUTO: 100 K/UL (ref 150–450)
PLATELET # BLD AUTO: 103 K/UL (ref 150–450)
PLATELET # BLD AUTO: 105 K/UL (ref 150–450)
PLATELET # BLD AUTO: 109 K/UL (ref 150–450)
PLATELET # BLD AUTO: 114 K/UL (ref 150–450)
PLATELET # BLD AUTO: 115 K/UL (ref 150–450)
PLATELET # BLD AUTO: 117 K/UL (ref 150–450)
PLATELET # BLD AUTO: 117 K/UL (ref 150–450)
PLATELET # BLD AUTO: 130 K/UL (ref 150–450)
PLATELET # BLD AUTO: 131 K/UL (ref 150–450)
PLATELET # BLD AUTO: 136 K/UL (ref 150–450)
PLATELET # BLD AUTO: 142 K/UL (ref 150–450)
PLATELET # BLD AUTO: 144 K/UL (ref 150–450)
PLATELET # BLD AUTO: 146 K/UL (ref 150–450)
PLATELET # BLD AUTO: 15 K/UL (ref 150–450)
PLATELET # BLD AUTO: 154 K/UL (ref 150–450)
PLATELET # BLD AUTO: 158 K/UL (ref 150–450)
PLATELET # BLD AUTO: 162 K/UL (ref 150–450)
PLATELET # BLD AUTO: 166 K/UL (ref 150–450)
PLATELET # BLD AUTO: 167 K/UL (ref 150–450)
PLATELET # BLD AUTO: 169 K/UL (ref 150–450)
PLATELET # BLD AUTO: 174 K/UL (ref 150–450)
PLATELET # BLD AUTO: 176 K/UL (ref 150–450)
PLATELET # BLD AUTO: 176 K/UL (ref 150–450)
PLATELET # BLD AUTO: 179 K/UL (ref 150–450)
PLATELET # BLD AUTO: 181 K/UL (ref 150–450)
PLATELET # BLD AUTO: 183 K/UL (ref 150–450)
PLATELET # BLD AUTO: 183 K/UL (ref 150–450)
PLATELET # BLD AUTO: 184 K/UL (ref 150–450)
PLATELET # BLD AUTO: 186 K/UL (ref 150–450)
PLATELET # BLD AUTO: 19 K/UL (ref 150–450)
PLATELET # BLD AUTO: 19 K/UL (ref 150–450)
PLATELET # BLD AUTO: 190 K/UL (ref 150–450)
PLATELET # BLD AUTO: 194 K/UL (ref 150–450)
PLATELET # BLD AUTO: 198 K/UL (ref 150–450)
PLATELET # BLD AUTO: 20 K/UL (ref 150–450)
PLATELET # BLD AUTO: 200 K/UL (ref 150–450)
PLATELET # BLD AUTO: 202 K/UL (ref 150–450)
PLATELET # BLD AUTO: 204 K/UL (ref 150–450)
PLATELET # BLD AUTO: 204 K/UL (ref 150–450)
PLATELET # BLD AUTO: 205 K/UL (ref 150–450)
PLATELET # BLD AUTO: 207 K/UL (ref 150–450)
PLATELET # BLD AUTO: 210 K/UL (ref 150–450)
PLATELET # BLD AUTO: 213 K/UL (ref 150–450)
PLATELET # BLD AUTO: 218 K/UL (ref 150–450)
PLATELET # BLD AUTO: 223 K/UL (ref 150–450)
PLATELET # BLD AUTO: 224 K/UL (ref 150–450)
PLATELET # BLD AUTO: 225 K/UL (ref 150–450)
PLATELET # BLD AUTO: 227 K/UL (ref 150–450)
PLATELET # BLD AUTO: 230 K/UL (ref 150–450)
PLATELET # BLD AUTO: 234 K/UL (ref 150–450)
PLATELET # BLD AUTO: 237 K/UL (ref 150–450)
PLATELET # BLD AUTO: 238 K/UL (ref 150–450)
PLATELET # BLD AUTO: 243 K/UL (ref 150–450)
PLATELET # BLD AUTO: 244 K/UL (ref 150–450)
PLATELET # BLD AUTO: 244 K/UL (ref 150–450)
PLATELET # BLD AUTO: 245 K/UL (ref 150–450)
PLATELET # BLD AUTO: 246 K/UL (ref 150–450)
PLATELET # BLD AUTO: 255 K/UL (ref 150–450)
PLATELET # BLD AUTO: 257 K/UL (ref 150–450)
PLATELET # BLD AUTO: 264 K/UL (ref 150–450)
PLATELET # BLD AUTO: 268 K/UL (ref 150–450)
PLATELET # BLD AUTO: 271 K/UL (ref 150–450)
PLATELET # BLD AUTO: 273 K/UL (ref 150–450)
PLATELET # BLD AUTO: 274 K/UL (ref 150–450)
PLATELET # BLD AUTO: 28 K/UL (ref 150–450)
PLATELET # BLD AUTO: 280 K/UL (ref 150–450)
PLATELET # BLD AUTO: 280 K/UL (ref 150–450)
PLATELET # BLD AUTO: 298 K/UL (ref 150–450)
PLATELET # BLD AUTO: 302 K/UL (ref 150–450)
PLATELET # BLD AUTO: 310 K/UL (ref 150–450)
PLATELET # BLD AUTO: 321 K/UL (ref 150–450)
PLATELET # BLD AUTO: 326 K/UL (ref 150–450)
PLATELET # BLD AUTO: 33 K/UL (ref 150–450)
PLATELET # BLD AUTO: 331 K/UL (ref 150–450)
PLATELET # BLD AUTO: 37 K/UL (ref 150–450)
PLATELET # BLD AUTO: 43 K/UL (ref 150–450)
PLATELET # BLD AUTO: 45 K/UL (ref 150–450)
PLATELET # BLD AUTO: 47 K/UL (ref 150–450)
PLATELET # BLD AUTO: 48 K/UL (ref 150–450)
PLATELET # BLD AUTO: 65 K/UL (ref 150–450)
PLATELET # BLD AUTO: 84 K/UL (ref 150–450)
PLATELET # BLD AUTO: 84 K/UL (ref 150–450)
PLATELET # BLD AUTO: 97 K/UL (ref 150–450)
PLATELET # BLD AUTO: 98 K/UL (ref 150–450)
PMV BLD AUTO: 10 FL (ref 9.4–12.3)
PMV BLD AUTO: 10.1 FL (ref 9.4–12.3)
PMV BLD AUTO: 10.2 FL (ref 9.4–12.3)
PMV BLD AUTO: 10.3 FL (ref 9.4–12.3)
PMV BLD AUTO: 10.4 FL (ref 9.4–12.3)
PMV BLD AUTO: 10.5 FL (ref 9.4–12.3)
PMV BLD AUTO: 10.6 FL (ref 9.4–12.3)
PMV BLD AUTO: 10.6 FL (ref 9.4–12.3)
PMV BLD AUTO: 10.7 FL (ref 9.4–12.3)
PMV BLD AUTO: 10.8 FL (ref 9.4–12.3)
PMV BLD AUTO: 10.9 FL (ref 9.4–12.3)
PMV BLD AUTO: 10.9 FL (ref 9.4–12.3)
PMV BLD AUTO: 11 FL (ref 9.4–12.3)
PMV BLD AUTO: 11.1 FL (ref 9.4–12.3)
PMV BLD AUTO: 11.1 FL (ref 9.4–12.3)
PMV BLD AUTO: 11.2 FL (ref 9.4–12.3)
PMV BLD AUTO: 11.2 FL (ref 9.4–12.3)
PMV BLD AUTO: 11.4 FL (ref 9.4–12.3)
PMV BLD AUTO: 11.4 FL (ref 9.4–12.3)
PMV BLD AUTO: 11.5 FL (ref 9.4–12.3)
PMV BLD AUTO: 11.6 FL (ref 9.4–12.3)
PMV BLD AUTO: 11.8 FL (ref 9.4–12.3)
PMV BLD AUTO: 12.1 FL (ref 9.4–12.3)
PMV BLD AUTO: 12.3 FL (ref 9.4–12.3)
PMV BLD AUTO: 12.4 FL (ref 9.4–12.3)
PMV BLD AUTO: 12.4 FL (ref 9.4–12.3)
PMV BLD AUTO: 12.6 FL (ref 9.4–12.3)
PMV BLD AUTO: 12.8 FL (ref 9.4–12.3)
PMV BLD AUTO: 13.3 FL (ref 9.4–12.3)
PMV BLD AUTO: 13.5 FL (ref 9.4–12.3)
PMV BLD AUTO: 9.1 FL (ref 9.4–12.3)
PMV BLD AUTO: 9.1 FL (ref 9.4–12.3)
PMV BLD AUTO: 9.2 FL (ref 9.4–12.3)
PMV BLD AUTO: 9.2 FL (ref 9.4–12.3)
PMV BLD AUTO: 9.3 FL (ref 9.4–12.3)
PMV BLD AUTO: 9.4 FL (ref 9.4–12.3)
PMV BLD AUTO: 9.4 FL (ref 9.4–12.3)
PMV BLD AUTO: 9.5 FL (ref 9.4–12.3)
PMV BLD AUTO: 9.6 FL (ref 9.4–12.3)
PMV BLD AUTO: 9.7 FL (ref 9.4–12.3)
PMV BLD AUTO: 9.8 FL (ref 9.4–12.3)
PMV BLD AUTO: 9.9 FL (ref 9.4–12.3)
PMV BLD AUTO: ABNORMAL FL (ref 9.4–12.3)
PO2 BLD: 110 MMHG (ref 75–100)
PO2 BLD: 110 MMHG (ref 75–100)
PO2 BLD: 115 MMHG (ref 75–100)
PO2 BLD: 115 MMHG (ref 75–100)
PO2 BLD: 128 MMHG (ref 75–100)
PO2 BLD: 144 MMHG (ref 75–100)
PO2 BLD: 146 MMHG (ref 75–100)
PO2 BLD: 175 MMHG (ref 75–100)
PO2 BLD: 177 MMHG (ref 75–100)
PO2 BLD: 193 MMHG (ref 75–100)
PO2 BLD: 221 MMHG (ref 75–100)
PO2 BLD: 234 MMHG (ref 75–100)
PO2 BLD: 237 MMHG (ref 75–100)
PO2 BLD: 242 MMHG (ref 75–100)
PO2 BLD: 307 MMHG (ref 75–100)
PO2 BLD: 325 MMHG (ref 75–100)
PO2 BLD: 59 MMHG (ref 75–100)
PO2 BLD: 60 MMHG (ref 75–100)
PO2 BLD: 61 MMHG (ref 75–100)
PO2 BLD: 67 MMHG (ref 75–100)
PO2 BLD: 67 MMHG (ref 75–100)
PO2 BLD: 68 MMHG (ref 75–100)
PO2 BLD: 68 MMHG (ref 75–100)
PO2 BLD: 69 MMHG (ref 75–100)
PO2 BLD: 69 MMHG (ref 75–100)
PO2 BLD: 71 MMHG (ref 75–100)
PO2 BLD: 73 MMHG (ref 75–100)
PO2 BLD: 73 MMHG (ref 75–100)
PO2 BLD: 76 MMHG (ref 75–100)
PO2 BLD: 77 MMHG (ref 75–100)
PO2 BLD: 84 MMHG (ref 75–100)
PO2 BLD: 85 MMHG (ref 75–100)
PO2 BLD: 86 MMHG (ref 75–100)
PO2 BLD: 94 MMHG (ref 75–100)
PO2 BLD: 96 MMHG (ref 75–100)
PO2 BLD: 98 MMHG (ref 75–100)
PO2 BLDV: 100 MMHG
PO2 BLDV: 23 MMHG
PO2 BLDV: 23 MMHG
PO2 BLDV: 24 MMHG
PO2 BLDV: 26 MMHG
PO2 BLDV: 27 MMHG
PO2 BLDV: 30 MMHG
PO2 BLDV: 44 MMHG
POTASSIUM BLD-SCNC: 3.2 MMOL/L (ref 3.5–5.1)
POTASSIUM BLD-SCNC: 4.7 MMOL/L (ref 3.5–5.1)
POTASSIUM BLD-SCNC: 4.8 MMOL/L (ref 3.5–5.1)
POTASSIUM BLD-SCNC: 4.9 MMOL/L (ref 3.5–5.1)
POTASSIUM BLD-SCNC: 4.9 MMOL/L (ref 3.5–5.1)
POTASSIUM BLD-SCNC: 5 MMOL/L (ref 3.5–5.1)
POTASSIUM BLD-SCNC: 5.1 MMOL/L (ref 3.5–5.1)
POTASSIUM BLD-SCNC: 5.1 MMOL/L (ref 3.5–5.1)
POTASSIUM BLD-SCNC: 5.2 MMOL/L (ref 3.5–5.1)
POTASSIUM BLD-SCNC: 5.8 MMOL/L (ref 3.5–5.1)
POTASSIUM BLD-SCNC: 5.9 MMOL/L (ref 3.5–5.1)
POTASSIUM BLD-SCNC: 6.1 MMOL/L (ref 3.5–5.1)
POTASSIUM BLD-SCNC: 6.1 MMOL/L (ref 3.5–5.1)
POTASSIUM SERPL-SCNC: 3.2 MMOL/L (ref 3.5–5.1)
POTASSIUM SERPL-SCNC: 3.2 MMOL/L (ref 3.5–5.1)
POTASSIUM SERPL-SCNC: 3.3 MMOL/L (ref 3.5–5.1)
POTASSIUM SERPL-SCNC: 3.4 MMOL/L (ref 3.5–5.1)
POTASSIUM SERPL-SCNC: 3.4 MMOL/L (ref 3.5–5.1)
POTASSIUM SERPL-SCNC: 3.5 MMOL/L (ref 3.5–5.1)
POTASSIUM SERPL-SCNC: 3.6 MMOL/L (ref 3.5–5.1)
POTASSIUM SERPL-SCNC: 3.7 MMOL/L (ref 3.5–5.1)
POTASSIUM SERPL-SCNC: 3.8 MMOL/L (ref 3.5–5.1)
POTASSIUM SERPL-SCNC: 3.9 MMOL/L (ref 3.5–5.1)
POTASSIUM SERPL-SCNC: 4 MMOL/L (ref 3.5–5.1)
POTASSIUM SERPL-SCNC: 4.1 MMOL/L (ref 3.5–5.1)
POTASSIUM SERPL-SCNC: 4.2 MMOL/L (ref 3.5–5.1)
POTASSIUM SERPL-SCNC: 4.3 MMOL/L (ref 3.5–5.1)
POTASSIUM SERPL-SCNC: 4.4 MMOL/L (ref 3.5–5.1)
POTASSIUM SERPL-SCNC: 4.5 MMOL/L (ref 3.5–5.1)
POTASSIUM SERPL-SCNC: 4.6 MMOL/L (ref 3.5–5.1)
POTASSIUM SERPL-SCNC: 4.7 MMOL/L (ref 3.5–5.1)
POTASSIUM SERPL-SCNC: 4.8 MMOL/L (ref 3.5–5.1)
POTASSIUM SERPL-SCNC: 4.9 MMOL/L (ref 3.5–5.1)
POTASSIUM SERPL-SCNC: 5 MMOL/L (ref 3.5–5.1)
POTASSIUM SERPL-SCNC: 5 MMOL/L (ref 3.5–5.1)
POTASSIUM SERPL-SCNC: 5.1 MMOL/L (ref 3.5–5.1)
POTASSIUM SERPL-SCNC: 5.1 MMOL/L (ref 3.5–5.1)
POTASSIUM SERPL-SCNC: 5.6 MMOL/L (ref 3.5–5.1)
POTASSIUM SERPL-SCNC: 5.8 MMOL/L (ref 3.5–5.1)
POTASSIUM SERPL-SCNC: 6.1 MMOL/L (ref 3.5–5.1)
POTASSIUM SERPL-SCNC: 6.4 MMOL/L (ref 3.5–5.1)
POTASSIUM SERPL-SCNC: 6.5 MMOL/L (ref 3.5–5.1)
PPD POC: NEGATIVE NEGATIVE
PRESSURE CONTROL, IPC: 12
PRESSURE CONTROL, IPC: 20
PRESSURE CONTROL, IPC: 24
PRESSURE SUPPORT SETTING VENT: 10 CMH2O
PRESSURE SUPPORT SETTING VENT: 14 CMH2O
PRESSURE SUPPORT SETTING VENT: 19 CMH2O
PROCALCITONIN SERPL-MCNC: 0.78 NG/ML
PROCALCITONIN SERPL-MCNC: 1.3 NG/ML
PROCALCITONIN SERPL-MCNC: 1.48 NG/ML
PROCALCITONIN SERPL-MCNC: 4.23 NG/ML
PROCALCITONIN SERPL-MCNC: 8.17 NG/ML
PROT SERPL-MCNC: 5.1 G/DL (ref 6.3–8.2)
PROT SERPL-MCNC: 5.1 G/DL (ref 6.3–8.2)
PROT SERPL-MCNC: 5.4 G/DL (ref 6.3–8.2)
PROT SERPL-MCNC: 5.5 G/DL (ref 6.3–8.2)
PROT SERPL-MCNC: 5.6 G/DL (ref 6.3–8.2)
PROT SERPL-MCNC: 5.9 G/DL (ref 6.3–8.2)
PROT SERPL-MCNC: 6.6 G/DL (ref 6.3–8.2)
PROT SERPL-MCNC: 6.9 G/DL (ref 6.3–8.2)
PROT SERPL-MCNC: 7 G/DL (ref 6.3–8.2)
PROT SERPL-MCNC: 7 G/DL (ref 6.3–8.2)
PROT SERPL-MCNC: 7.1 G/DL (ref 6.3–8.2)
PROT SERPL-MCNC: 7.2 G/DL (ref 6.3–8.2)
PROT SERPL-MCNC: 7.3 G/DL (ref 6.3–8.2)
PROT SERPL-MCNC: 7.4 G/DL (ref 6.3–8.2)
PROT SERPL-MCNC: 7.4 G/DL (ref 6.3–8.2)
PROT SERPL-MCNC: 7.5 G/DL (ref 6.3–8.2)
PROT SERPL-MCNC: 7.5 G/DL (ref 6.3–8.2)
PROT SERPL-MCNC: 7.6 G/DL (ref 6.3–8.2)
PROT UR STRIP-MCNC: 30 MG/DL
PROT UR STRIP-MCNC: ABNORMAL MG/DL
PROT UR STRIP-MCNC: NEGATIVE MG/DL
PROT UR STRIP-MCNC: NEGATIVE MG/DL
PROTHROMBIN TIME: 12.9 SEC (ref 11.7–14.5)
PROTHROMBIN TIME: 15.7 SEC (ref 12–14.7)
PROTHROMBIN TIME: 16.7 SEC (ref 12–14.7)
PROTHROMBIN TIME: 17.3 SEC (ref 11.7–14.5)
PROTHROMBIN TIME: 17.6 SEC (ref 12–14.7)
PROTHROMBIN TIME: 19.1 SEC (ref 11.7–14.5)
PROTHROMBIN TIME: 19.3 SEC (ref 11.7–14.5)
PROTHROMBIN TIME: 19.4 SEC (ref 12–14.7)
PROTHROMBIN TIME: 20.2 SEC (ref 12–14.7)
PROTHROMBIN TIME: 20.4 SEC (ref 12–14.7)
PROTHROMBIN TIME: 20.4 SEC (ref 12–14.7)
PROTHROMBIN TIME: 20.7 SEC (ref 12–14.7)
PROTHROMBIN TIME: 20.8 SEC (ref 12–14.7)
PROTHROMBIN TIME: 20.8 SEC (ref 12–14.7)
PROTHROMBIN TIME: 21 SEC (ref 12–14.7)
PROTHROMBIN TIME: 21.3 SEC (ref 12–14.7)
PROTHROMBIN TIME: 21.5 SEC (ref 12–14.7)
PROTHROMBIN TIME: 21.7 SEC (ref 12–14.7)
PROTHROMBIN TIME: 22.3 SEC (ref 12–14.7)
PROTHROMBIN TIME: 22.4 SEC (ref 12–14.7)
PROTHROMBIN TIME: 22.6 SEC (ref 12–14.7)
PROTHROMBIN TIME: 22.7 SEC (ref 12–14.7)
PROTHROMBIN TIME: 23.1 SEC (ref 12–14.7)
PROTHROMBIN TIME: 23.3 SEC (ref 12–14.7)
PROTHROMBIN TIME: 23.4 SEC (ref 12–14.7)
PROTHROMBIN TIME: 23.5 SEC (ref 12–14.7)
PROTHROMBIN TIME: 23.8 SEC (ref 12–14.7)
PROTHROMBIN TIME: 23.8 SEC (ref 12–14.7)
PROTHROMBIN TIME: 23.9 SEC (ref 12–14.7)
PROTHROMBIN TIME: 24.4 SEC (ref 12–14.7)
PROTHROMBIN TIME: 24.4 SEC (ref 12–14.7)
PROTHROMBIN TIME: 24.5 SEC (ref 12–14.7)
PROTHROMBIN TIME: 25.2 SEC (ref 12–14.7)
PROTHROMBIN TIME: 25.8 SEC (ref 12–14.7)
PROTHROMBIN TIME: 26.5 SEC (ref 12–14.7)
PROTHROMBIN TIME: 26.6 SEC (ref 12–14.7)
PROTHROMBIN TIME: 26.7 SEC (ref 12–14.7)
PROTHROMBIN TIME: 27 SEC (ref 12–14.7)
PROTHROMBIN TIME: 27.6 SEC (ref 12–14.7)
PROTHROMBIN TIME: 27.7 SEC (ref 12–14.7)
PROTHROMBIN TIME: 27.8 SEC (ref 12–14.7)
PROTHROMBIN TIME: 27.9 SEC (ref 12–14.7)
PROTHROMBIN TIME: 28 SEC (ref 12–14.7)
PROTHROMBIN TIME: 28.4 SEC (ref 12–14.7)
PROTHROMBIN TIME: 28.4 SEC (ref 12–14.7)
PROTHROMBIN TIME: 28.7 SEC (ref 12–14.7)
PROTHROMBIN TIME: 28.9 SEC (ref 12–14.7)
PROTHROMBIN TIME: 29.1 SEC (ref 12–14.7)
PROTHROMBIN TIME: 29.3 SEC (ref 12–14.7)
PROTHROMBIN TIME: 29.8 SEC (ref 12–14.7)
PROTHROMBIN TIME: 29.9 SEC (ref 12–14.7)
PROTHROMBIN TIME: 30.1 SEC (ref 12–14.7)
PROTHROMBIN TIME: 30.1 SEC (ref 12–14.7)
PROTHROMBIN TIME: 30.4 SEC (ref 12–14.7)
PROTHROMBIN TIME: 30.8 SEC (ref 12–14.7)
PROTHROMBIN TIME: 31 SEC (ref 12–14.7)
PROTHROMBIN TIME: 31.4 SEC (ref 11.7–14.5)
PROTHROMBIN TIME: 31.5 SEC (ref 12–14.7)
PROTHROMBIN TIME: 32.2 SEC (ref 12–14.7)
PROTHROMBIN TIME: 32.7 SEC (ref 12–14.7)
PROTHROMBIN TIME: 32.8 SEC (ref 12–14.7)
PROTHROMBIN TIME: 33.5 SEC (ref 12–14.7)
PROTHROMBIN TIME: 34.4 SEC (ref 12–14.7)
PROTHROMBIN TIME: 34.7 SEC (ref 12–14.7)
PROTHROMBIN TIME: 35.1 SEC (ref 12–14.7)
PROTHROMBIN TIME: 36.4 SEC (ref 12–14.7)
PROTHROMBIN TIME: 37.7 SEC (ref 12–14.7)
PROTHROMBIN TIME: 38.1 SEC (ref 12–14.7)
PROTHROMBIN TIME: 38.3 SEC (ref 12–14.7)
PROTHROMBIN TIME: 39.7 SEC (ref 12–14.7)
PROTHROMBIN TIME: 40.9 SEC (ref 12–14.7)
PROTHROMBIN TIME: 41.2 SEC (ref 12–14.7)
PROTHROMBIN TIME: 41.6 SEC (ref 12–14.7)
PROTHROMBIN TIME: 42.7 SEC (ref 12–14.7)
PROTHROMBIN TIME: 43 SEC (ref 12–14.7)
PROTHROMBIN TIME: 46.5 SEC (ref 12–14.7)
PROTHROMBIN TIME: 47.8 SEC (ref 12–14.7)
PROTHROMBIN TIME: 53.2 SEC (ref 12–14.7)
PROTHROMBIN TIME: 57.2 SEC (ref 12–14.7)
PROTHROMBIN TIME: 65.3 SEC (ref 12–14.7)
PROTHROMBIN TIME: 67.4 SEC (ref 12–14.7)
PROTHROMBIN TIME: 79.6 SEC (ref 12–14.7)
PT BLD: 56.8 SECS (ref 9.6–11.6)
Q-T INTERVAL, ECG07: 326 MS
Q-T INTERVAL, ECG07: 354 MS
Q-T INTERVAL, ECG07: 368 MS
Q-T INTERVAL, ECG07: 384 MS
Q-T INTERVAL, ECG07: 406 MS
Q-T INTERVAL, ECG07: 456 MS
Q-T INTERVAL, ECG07: 500 MS
Q-T INTERVAL, ECG07: 516 MS
QRS DURATION, ECG06: 112 MS
QRS DURATION, ECG06: 144 MS
QRS DURATION, ECG06: 146 MS
QRS DURATION, ECG06: 146 MS
QRS DURATION, ECG06: 172 MS
QRS DURATION, ECG06: 88 MS
QRS DURATION, ECG06: 94 MS
QRS DURATION, ECG06: 94 MS
QTC CALCULATION (BEZET), ECG08: 428 MS
QTC CALCULATION (BEZET), ECG08: 449 MS
QTC CALCULATION (BEZET), ECG08: 452 MS
QTC CALCULATION (BEZET), ECG08: 456 MS
QTC CALCULATION (BEZET), ECG08: 459 MS
QTC CALCULATION (BEZET), ECG08: 519 MS
QTC CALCULATION (BEZET), ECG08: 531 MS
QTC CALCULATION (BEZET), ECG08: 557 MS
RBC # BLD AUTO: 2.37 M/UL (ref 4.23–5.6)
RBC # BLD AUTO: 2.41 M/UL (ref 4.23–5.6)
RBC # BLD AUTO: 2.49 M/UL (ref 4.23–5.6)
RBC # BLD AUTO: 2.53 M/UL (ref 4.23–5.6)
RBC # BLD AUTO: 2.55 M/UL (ref 4.23–5.6)
RBC # BLD AUTO: 2.59 M/UL (ref 4.23–5.6)
RBC # BLD AUTO: 2.59 M/UL (ref 4.23–5.6)
RBC # BLD AUTO: 2.6 M/UL (ref 4.23–5.6)
RBC # BLD AUTO: 2.64 M/UL (ref 4.23–5.6)
RBC # BLD AUTO: 2.64 M/UL (ref 4.23–5.6)
RBC # BLD AUTO: 2.68 M/UL (ref 4.23–5.6)
RBC # BLD AUTO: 2.69 M/UL (ref 4.23–5.6)
RBC # BLD AUTO: 2.7 M/UL (ref 4.23–5.6)
RBC # BLD AUTO: 2.71 M/UL (ref 4.23–5.6)
RBC # BLD AUTO: 2.72 M/UL (ref 4.23–5.6)
RBC # BLD AUTO: 2.72 M/UL (ref 4.23–5.6)
RBC # BLD AUTO: 2.73 M/UL (ref 4.23–5.6)
RBC # BLD AUTO: 2.73 M/UL (ref 4.23–5.6)
RBC # BLD AUTO: 2.74 M/UL (ref 4.23–5.6)
RBC # BLD AUTO: 2.74 M/UL (ref 4.23–5.6)
RBC # BLD AUTO: 2.76 M/UL (ref 4.23–5.6)
RBC # BLD AUTO: 2.77 M/UL (ref 4.23–5.6)
RBC # BLD AUTO: 2.77 M/UL (ref 4.23–5.6)
RBC # BLD AUTO: 2.79 M/UL (ref 4.23–5.6)
RBC # BLD AUTO: 2.79 M/UL (ref 4.23–5.6)
RBC # BLD AUTO: 2.8 M/UL (ref 4.23–5.6)
RBC # BLD AUTO: 2.81 M/UL (ref 4.23–5.6)
RBC # BLD AUTO: 2.83 M/UL (ref 4.23–5.6)
RBC # BLD AUTO: 2.84 M/UL (ref 4.23–5.6)
RBC # BLD AUTO: 2.84 M/UL (ref 4.23–5.6)
RBC # BLD AUTO: 2.86 M/UL (ref 4.23–5.6)
RBC # BLD AUTO: 2.86 M/UL (ref 4.23–5.6)
RBC # BLD AUTO: 2.87 M/UL (ref 4.23–5.6)
RBC # BLD AUTO: 2.9 M/UL (ref 4.23–5.6)
RBC # BLD AUTO: 2.9 M/UL (ref 4.23–5.6)
RBC # BLD AUTO: 2.92 M/UL (ref 4.23–5.6)
RBC # BLD AUTO: 2.93 M/UL (ref 4.23–5.6)
RBC # BLD AUTO: 2.93 M/UL (ref 4.23–5.6)
RBC # BLD AUTO: 2.96 M/UL (ref 4.23–5.6)
RBC # BLD AUTO: 2.97 M/UL (ref 4.23–5.6)
RBC # BLD AUTO: 2.98 M/UL (ref 4.23–5.6)
RBC # BLD AUTO: 2.98 M/UL (ref 4.23–5.6)
RBC # BLD AUTO: 3.02 M/UL (ref 4.23–5.6)
RBC # BLD AUTO: 3.04 M/UL (ref 4.23–5.6)
RBC # BLD AUTO: 3.06 M/UL (ref 4.23–5.6)
RBC # BLD AUTO: 3.08 M/UL (ref 4.23–5.6)
RBC # BLD AUTO: 3.14 M/UL (ref 4.23–5.6)
RBC # BLD AUTO: 3.14 M/UL (ref 4.23–5.67)
RBC # BLD AUTO: 3.15 M/UL (ref 4.23–5.6)
RBC # BLD AUTO: 3.18 M/UL (ref 4.23–5.6)
RBC # BLD AUTO: 3.24 M/UL (ref 4.23–5.6)
RBC # BLD AUTO: 3.27 M/UL (ref 4.23–5.6)
RBC # BLD AUTO: 3.27 M/UL (ref 4.23–5.6)
RBC # BLD AUTO: 3.28 M/UL (ref 4.23–5.6)
RBC # BLD AUTO: 3.3 M/UL (ref 4.23–5.6)
RBC # BLD AUTO: 3.31 M/UL (ref 4.23–5.6)
RBC # BLD AUTO: 3.32 M/UL (ref 4.23–5.6)
RBC # BLD AUTO: 3.32 M/UL (ref 4.23–5.6)
RBC # BLD AUTO: 3.33 M/UL (ref 4.23–5.6)
RBC # BLD AUTO: 3.35 M/UL (ref 4.23–5.6)
RBC # BLD AUTO: 3.36 M/UL (ref 4.23–5.6)
RBC # BLD AUTO: 3.41 M/UL (ref 4.23–5.6)
RBC # BLD AUTO: 3.41 M/UL (ref 4.23–5.6)
RBC # BLD AUTO: 3.42 M/UL (ref 4.23–5.6)
RBC # BLD AUTO: 3.43 M/UL (ref 4.23–5.6)
RBC # BLD AUTO: 3.46 M/UL (ref 4.23–5.6)
RBC # BLD AUTO: 3.46 M/UL (ref 4.23–5.6)
RBC # BLD AUTO: 3.49 M/UL (ref 4.23–5.6)
RBC # BLD AUTO: 3.56 M/UL (ref 4.23–5.6)
RBC # BLD AUTO: 3.57 M/UL (ref 4.23–5.6)
RBC # BLD AUTO: 3.57 M/UL (ref 4.23–5.6)
RBC # BLD AUTO: 3.59 M/UL (ref 4.23–5.6)
RBC # BLD AUTO: 3.6 M/UL (ref 4.23–5.6)
RBC # BLD AUTO: 3.63 M/UL (ref 4.23–5.6)
RBC # BLD AUTO: 3.64 M/UL (ref 4.23–5.6)
RBC # BLD AUTO: 3.69 M/UL (ref 4.23–5.6)
RBC # BLD AUTO: 3.73 M/UL (ref 4.23–5.6)
RBC # BLD AUTO: 3.76 M/UL (ref 4.23–5.6)
RBC #/AREA URNS HPF: ABNORMAL /HPF
RBC MORPH BLD: ABNORMAL
RBC MORPH BLD: ABNORMAL
RETICS # AUTO: 0.05 M/UL (ref 0.03–0.1)
RETICS # AUTO: 0.13 M/UL (ref 0.03–0.1)
RETICS/RBC NFR AUTO: 1.7 % (ref 0.3–2)
RETICS/RBC NFR AUTO: 4.6 % (ref 0.3–2)
SAO2 % BLD: 100 % (ref 95–98)
SAO2 % BLD: 88 % (ref 95–98)
SAO2 % BLD: 89 % (ref 95–98)
SAO2 % BLD: 91 % (ref 95–98)
SAO2 % BLD: 91 % (ref 95–98)
SAO2 % BLD: 92 % (ref 95–98)
SAO2 % BLD: 92 % (ref 95–98)
SAO2 % BLD: 93 % (ref 95–98)
SAO2 % BLD: 94 % (ref 95–98)
SAO2 % BLD: 94 % (ref 95–98)
SAO2 % BLD: 95 % (ref 95–98)
SAO2 % BLD: 96 % (ref 95–98)
SAO2 % BLD: 97 % (ref 95–98)
SAO2 % BLD: 98 % (ref 95–98)
SAO2 % BLD: 99 % (ref 95–98)
SAO2 % BLDV: 31 % (ref 65–88)
SAO2 % BLDV: 39 % (ref 65–88)
SAO2 % BLDV: 39 % (ref 65–88)
SAO2 % BLDV: 43 % (ref 65–88)
SAO2 % BLDV: 48 % (ref 65–88)
SAO2 % BLDV: 50 % (ref 65–88)
SAO2 % BLDV: 81 % (ref 65–88)
SAO2 % BLDV: 98 % (ref 65–88)
SERVICE CMNT-IMP: ABNORMAL
SERVICE CMNT-IMP: NORMAL
SODIUM BLD-SCNC: 136 MMOL/L (ref 136–145)
SODIUM BLD-SCNC: 137 MMOL/L (ref 136–145)
SODIUM BLD-SCNC: 138 MMOL/L (ref 136–145)
SODIUM BLD-SCNC: 139 MMOL/L (ref 136–145)
SODIUM BLD-SCNC: 139 MMOL/L (ref 136–145)
SODIUM BLD-SCNC: 140 MMOL/L (ref 136–145)
SODIUM BLD-SCNC: 140 MMOL/L (ref 136–145)
SODIUM SERPL-SCNC: 131 MMOL/L (ref 136–145)
SODIUM SERPL-SCNC: 132 MMOL/L (ref 136–145)
SODIUM SERPL-SCNC: 133 MMOL/L (ref 136–145)
SODIUM SERPL-SCNC: 134 MMOL/L (ref 136–145)
SODIUM SERPL-SCNC: 135 MMOL/L (ref 136–145)
SODIUM SERPL-SCNC: 136 MMOL/L (ref 136–145)
SODIUM SERPL-SCNC: 137 MMOL/L (ref 136–145)
SODIUM SERPL-SCNC: 138 MMOL/L (ref 136–145)
SODIUM SERPL-SCNC: 139 MMOL/L (ref 136–145)
SODIUM SERPL-SCNC: 140 MMOL/L (ref 136–145)
SODIUM SERPL-SCNC: 141 MMOL/L (ref 136–145)
SODIUM SERPL-SCNC: 142 MMOL/L (ref 136–145)
SODIUM SERPL-SCNC: 143 MMOL/L (ref 136–145)
SODIUM SERPL-SCNC: 145 MMOL/L (ref 136–145)
SP GR UR REFRACTOMETRY: 1.01 (ref 1–1.02)
SP GR UR REFRACTOMETRY: 1.01 (ref 1–1.02)
SP GR UR REFRACTOMETRY: 1.02 (ref 1–1.02)
SP GR UR REFRACTOMETRY: ABNORMAL (ref 1–1.03)
SP GR UR REFRACTOMETRY: ABNORMAL (ref 1–1.03)
SPECIMEN EXP DATE BLD: NORMAL
SPECIMEN TYPE: ABNORMAL
SRA 100IU/ML UFH SER-ACNC: 1 % (ref 0–20)
SRA 100IU/ML UFH SER-ACNC: 25 % (ref 0–20)
SRA UFH SER-IMP: ABNORMAL
SRA UFH SER-IMP: ABNORMAL
STAPHYLOCOCCUS AUREUS: DETECTED
STAPHYLOCOCCUS, STAPP: DETECTED
STATUS OF UNIT,%ST: NORMAL
TIBC SERPL-MCNC: 129 UG/DL (ref 250–450)
TIBC SERPL-MCNC: 147 UG/DL (ref 250–450)
TIBC SERPL-MCNC: 161 UG/DL (ref 250–450)
TOTAL RESP. RATE, ITRR: 21
TRIGL SERPL-MCNC: 167 MG/DL (ref 35–150)
TROPONIN I SERPL-MCNC: <0.02 NG/ML (ref 0.02–0.05)
TSH SERPL DL<=0.005 MIU/L-ACNC: 0.81 UIU/ML (ref 0.36–3.74)
UNFRAC HEPARIN LOW DOSE: 55 % (ref 0–20)
UNFRAC HEPARIN LOW DOSE: 94 % (ref 0–20)
UNIT DIVISION, %UDIV: 0
UROBILINOGEN UR QL STRIP.AUTO: 0.2 EU/DL (ref 0.2–1)
UROBILINOGEN UR QL STRIP.AUTO: 0.2 EU/DL (ref 0.2–1)
UROBILINOGEN UR QL STRIP.AUTO: 1 EU/DL (ref 0.2–1)
UROBILINOGEN UR QL STRIP.AUTO: ABNORMAL EU/DL (ref 0.2–1)
VANCOMYCIN SERPL-MCNC: 11.9 UG/ML
VANCOMYCIN SERPL-MCNC: 12.5 UG/ML
VANCOMYCIN SERPL-MCNC: 16.7 UG/ML
VANCOMYCIN SERPL-MCNC: 17.8 UG/ML
VANCOMYCIN SERPL-MCNC: 18.6 UG/ML
VANCOMYCIN SERPL-MCNC: 19.9 UG/ML
VANCOMYCIN SERPL-MCNC: 20.5 UG/ML
VANCOMYCIN SERPL-MCNC: 21 UG/ML
VANCOMYCIN SERPL-MCNC: 26.9 UG/ML
VANCOMYCIN SERPL-MCNC: 31.1 UG/ML
VANCOMYCIN SERPL-MCNC: 8.8 UG/ML
VANCOMYCIN SERPL-MCNC: 9.5 UG/ML
VANCOMYCIN TROUGH SERPL-MCNC: 16.9 UG/ML (ref 5–20)
VANCOMYCIN TROUGH SERPL-MCNC: 18.6 UG/ML (ref 5–20)
VANCOMYCIN TROUGH SERPL-MCNC: 19.9 UG/ML (ref 5–20)
VANCOMYCIN TROUGH SERPL-MCNC: 23.9 UG/ML (ref 5–20)
VANCOMYCIN TROUGH SERPL-MCNC: 25.2 UG/ML (ref 5–20)
VANCOMYCIN TROUGH SERPL-MCNC: 4.6 UG/ML (ref 5–20)
VENTILATION MODE VENT: ABNORMAL
VENTRICULAR RATE, ECG03: 101 BPM
VENTRICULAR RATE, ECG03: 114 BPM
VENTRICULAR RATE, ECG03: 68 BPM
VENTRICULAR RATE, ECG03: 70 BPM
VENTRICULAR RATE, ECG03: 75 BPM
VENTRICULAR RATE, ECG03: 76 BPM
VENTRICULAR RATE, ECG03: 78 BPM
VENTRICULAR RATE, ECG03: 91 BPM
VIT B12 SERPL-MCNC: 1443 PG/ML (ref 193–986)
VIT B12 SERPL-MCNC: 828 PG/ML (ref 193–986)
VIT B12 SERPL-MCNC: 963 PG/ML (ref 193–986)
VT SETTING VENT: 450 ML
VT SETTING VENT: 500 ML
WBC # BLD AUTO: 10 K/UL (ref 4.3–11.1)
WBC # BLD AUTO: 10.1 K/UL (ref 4.3–11.1)
WBC # BLD AUTO: 10.3 K/UL (ref 4.3–11.1)
WBC # BLD AUTO: 10.5 K/UL (ref 4.3–11.1)
WBC # BLD AUTO: 10.6 K/UL (ref 4.3–11.1)
WBC # BLD AUTO: 10.7 K/UL (ref 4.3–11.1)
WBC # BLD AUTO: 10.9 K/UL (ref 4.3–11.1)
WBC # BLD AUTO: 10.9 K/UL (ref 4.3–11.1)
WBC # BLD AUTO: 11.2 K/UL (ref 4.3–11.1)
WBC # BLD AUTO: 11.6 K/UL (ref 4.3–11.1)
WBC # BLD AUTO: 11.7 K/UL (ref 4.3–11.1)
WBC # BLD AUTO: 12 K/UL (ref 4.3–11.1)
WBC # BLD AUTO: 12.2 K/UL (ref 4.3–11.1)
WBC # BLD AUTO: 12.4 K/UL (ref 4.3–11.1)
WBC # BLD AUTO: 12.5 K/UL (ref 4.3–11.1)
WBC # BLD AUTO: 12.9 K/UL (ref 4.3–11.1)
WBC # BLD AUTO: 13.1 K/UL (ref 4.3–11.1)
WBC # BLD AUTO: 13.8 K/UL (ref 4.3–11.1)
WBC # BLD AUTO: 14.6 K/UL (ref 4.3–11.1)
WBC # BLD AUTO: 14.8 K/UL (ref 4.3–11.1)
WBC # BLD AUTO: 15.6 K/UL (ref 4.3–11.1)
WBC # BLD AUTO: 15.6 K/UL (ref 4.3–11.1)
WBC # BLD AUTO: 15.8 K/UL (ref 4.3–11.1)
WBC # BLD AUTO: 15.8 K/UL (ref 4.3–11.1)
WBC # BLD AUTO: 16 K/UL (ref 4.3–11.1)
WBC # BLD AUTO: 16.2 K/UL (ref 4.3–11.1)
WBC # BLD AUTO: 16.4 K/UL (ref 4.3–11.1)
WBC # BLD AUTO: 16.5 K/UL (ref 4.3–11.1)
WBC # BLD AUTO: 16.6 K/UL (ref 4.3–11.1)
WBC # BLD AUTO: 16.6 K/UL (ref 4.3–11.1)
WBC # BLD AUTO: 16.9 K/UL (ref 4.3–11.1)
WBC # BLD AUTO: 17 K/UL (ref 4.3–11.1)
WBC # BLD AUTO: 17.7 K/UL (ref 4.3–11.1)
WBC # BLD AUTO: 19.1 K/UL (ref 4.3–11.1)
WBC # BLD AUTO: 21.2 K/UL (ref 4.3–11.1)
WBC # BLD AUTO: 21.3 K/UL (ref 4.3–11.1)
WBC # BLD AUTO: 26.2 K/UL (ref 4.3–11.1)
WBC # BLD AUTO: 6.9 K/UL (ref 4.3–11.1)
WBC # BLD AUTO: 6.9 K/UL (ref 4.3–11.1)
WBC # BLD AUTO: 7.1 K/UL (ref 4.3–11.1)
WBC # BLD AUTO: 7.2 K/UL (ref 4.3–11.1)
WBC # BLD AUTO: 7.3 K/UL (ref 4.3–11.1)
WBC # BLD AUTO: 7.5 K/UL (ref 4.3–11.1)
WBC # BLD AUTO: 7.6 K/UL (ref 4.3–11.1)
WBC # BLD AUTO: 7.7 K/UL (ref 4.3–11.1)
WBC # BLD AUTO: 8.1 K/UL (ref 4.3–11.1)
WBC # BLD AUTO: 8.6 K/UL (ref 4.3–11.1)
WBC # BLD AUTO: 8.6 K/UL (ref 4.3–11.1)
WBC # BLD AUTO: 8.7 K/UL (ref 4.3–11.1)
WBC # BLD AUTO: 9 K/UL (ref 4.3–11.1)
WBC # BLD AUTO: 9.1 K/UL (ref 4.3–11.1)
WBC # BLD AUTO: 9.1 K/UL (ref 4.3–11.1)
WBC # BLD AUTO: 9.3 K/UL (ref 4.3–11.1)
WBC # BLD AUTO: 9.3 K/UL (ref 4.3–11.1)
WBC # BLD AUTO: 9.4 K/UL (ref 4.3–11.1)
WBC # BLD AUTO: 9.5 K/UL (ref 4.3–11.1)
WBC # BLD AUTO: 9.7 K/UL (ref 4.3–11.1)
WBC # BLD AUTO: 9.9 K/UL (ref 4.3–11.1)
WBC MORPH BLD: ABNORMAL
WBC URNS QL MICRO: ABNORMAL /HPF
YEAST URNS QL MICRO: ABNORMAL

## 2020-01-01 PROCEDURE — 36592 COLLECT BLOOD FROM PICC: CPT

## 2020-01-01 PROCEDURE — 74011636637 HC RX REV CODE- 636/637: Performed by: INTERNAL MEDICINE

## 2020-01-01 PROCEDURE — 80048 BASIC METABOLIC PNL TOTAL CA: CPT

## 2020-01-01 PROCEDURE — 36600 WITHDRAWAL OF ARTERIAL BLOOD: CPT

## 2020-01-01 PROCEDURE — 74011250636 HC RX REV CODE- 250/636: Performed by: INTERNAL MEDICINE

## 2020-01-01 PROCEDURE — 77030040393 HC DRSG OPTIFOAM GENT MDII -B

## 2020-01-01 PROCEDURE — 74011250637 HC RX REV CODE- 250/637: Performed by: PHYSICIAN ASSISTANT

## 2020-01-01 PROCEDURE — 74011000258 HC RX REV CODE- 258: Performed by: PHYSICIAN ASSISTANT

## 2020-01-01 PROCEDURE — 74011250637 HC RX REV CODE- 250/637: Performed by: NURSE PRACTITIONER

## 2020-01-01 PROCEDURE — 74011000250 HC RX REV CODE- 250: Performed by: THORACIC SURGERY (CARDIOTHORACIC VASCULAR SURGERY)

## 2020-01-01 PROCEDURE — 74011000250 HC RX REV CODE- 250: Performed by: INTERNAL MEDICINE

## 2020-01-01 PROCEDURE — 94003 VENT MGMT INPAT SUBQ DAY: CPT

## 2020-01-01 PROCEDURE — 94640 AIRWAY INHALATION TREATMENT: CPT

## 2020-01-01 PROCEDURE — 85025 COMPLETE CBC W/AUTO DIFF WBC: CPT

## 2020-01-01 PROCEDURE — 74011000250 HC RX REV CODE- 250: Performed by: PHYSICIAN ASSISTANT

## 2020-01-01 PROCEDURE — 74011000250 HC RX REV CODE- 250: Performed by: NURSE ANESTHETIST, CERTIFIED REGISTERED

## 2020-01-01 PROCEDURE — 5A09557 ASSISTANCE WITH RESPIRATORY VENTILATION, GREATER THAN 96 CONSECUTIVE HOURS, CONTINUOUS POSITIVE AIRWAY PRESSURE: ICD-10-PCS | Performed by: INTERNAL MEDICINE

## 2020-01-01 PROCEDURE — 85730 THROMBOPLASTIN TIME PARTIAL: CPT

## 2020-01-01 PROCEDURE — 71045 X-RAY EXAM CHEST 1 VIEW: CPT

## 2020-01-01 PROCEDURE — 36415 COLL VENOUS BLD VENIPUNCTURE: CPT

## 2020-01-01 PROCEDURE — 65270000029 HC RM PRIVATE

## 2020-01-01 PROCEDURE — 74011250636 HC RX REV CODE- 250/636: Performed by: THORACIC SURGERY (CARDIOTHORACIC VASCULAR SURGERY)

## 2020-01-01 PROCEDURE — P9045 ALBUMIN (HUMAN), 5%, 250 ML: HCPCS | Performed by: THORACIC SURGERY (CARDIOTHORACIC VASCULAR SURGERY)

## 2020-01-01 PROCEDURE — 74011636637 HC RX REV CODE- 636/637: Performed by: NURSE PRACTITIONER

## 2020-01-01 PROCEDURE — 94760 N-INVAS EAR/PLS OXIMETRY 1: CPT

## 2020-01-01 PROCEDURE — 74011250637 HC RX REV CODE- 250/637: Performed by: THORACIC SURGERY (CARDIOTHORACIC VASCULAR SURGERY)

## 2020-01-01 PROCEDURE — 82962 GLUCOSE BLOOD TEST: CPT

## 2020-01-01 PROCEDURE — 76060000032 HC ANESTHESIA 0.5 TO 1 HR: Performed by: SURGERY

## 2020-01-01 PROCEDURE — 74011250636 HC RX REV CODE- 250/636

## 2020-01-01 PROCEDURE — 85610 PROTHROMBIN TIME: CPT

## 2020-01-01 PROCEDURE — 85027 COMPLETE CBC AUTOMATED: CPT

## 2020-01-01 PROCEDURE — C1750 CATH, HEMODIALYSIS,LONG-TERM: HCPCS

## 2020-01-01 PROCEDURE — 74011250637 HC RX REV CODE- 250/637: Performed by: FAMILY MEDICINE

## 2020-01-01 PROCEDURE — 97530 THERAPEUTIC ACTIVITIES: CPT

## 2020-01-01 PROCEDURE — 80202 ASSAY OF VANCOMYCIN: CPT

## 2020-01-01 PROCEDURE — 80053 COMPREHEN METABOLIC PANEL: CPT

## 2020-01-01 PROCEDURE — 99232 SBSQ HOSP IP/OBS MODERATE 35: CPT | Performed by: INTERNAL MEDICINE

## 2020-01-01 PROCEDURE — 77010033678 HC OXYGEN DAILY

## 2020-01-01 PROCEDURE — 76604 US EXAM CHEST: CPT | Performed by: INTERNAL MEDICINE

## 2020-01-01 PROCEDURE — 74011000250 HC RX REV CODE- 250: Performed by: NURSE PRACTITIONER

## 2020-01-01 PROCEDURE — 77030018836 HC SOL IRR NACL ICUM -A

## 2020-01-01 PROCEDURE — 77030013794 HC KT TRNSDUC BLD EDWD -B

## 2020-01-01 PROCEDURE — 65660000004 HC RM CVT STEPDOWN

## 2020-01-01 PROCEDURE — 74011000250 HC RX REV CODE- 250: Performed by: SURGERY

## 2020-01-01 PROCEDURE — 94660 CPAP INITIATION&MGMT: CPT

## 2020-01-01 PROCEDURE — 80069 RENAL FUNCTION PANEL: CPT

## 2020-01-01 PROCEDURE — 99285 EMERGENCY DEPT VISIT HI MDM: CPT

## 2020-01-01 PROCEDURE — 97110 THERAPEUTIC EXERCISES: CPT

## 2020-01-01 PROCEDURE — 83735 ASSAY OF MAGNESIUM: CPT

## 2020-01-01 PROCEDURE — 74011636637 HC RX REV CODE- 636/637: Performed by: FAMILY MEDICINE

## 2020-01-01 PROCEDURE — 84132 ASSAY OF SERUM POTASSIUM: CPT

## 2020-01-01 PROCEDURE — G0299 HHS/HOSPICE OF RN EA 15 MIN: HCPCS

## 2020-01-01 PROCEDURE — 76937 US GUIDE VASCULAR ACCESS: CPT

## 2020-01-01 PROCEDURE — 74011000258 HC RX REV CODE- 258: Performed by: NURSE PRACTITIONER

## 2020-01-01 PROCEDURE — 74011250636 HC RX REV CODE- 250/636: Performed by: NURSE PRACTITIONER

## 2020-01-01 PROCEDURE — 77030032994 HC SOL INJ SOD CL 0.9% LFCR 500ML

## 2020-01-01 PROCEDURE — 65660000000 HC RM CCU STEPDOWN

## 2020-01-01 PROCEDURE — HOSPICE MEDICATION HC HH HOSPICE MEDICATION

## 2020-01-01 PROCEDURE — 74011250637 HC RX REV CODE- 250/637: Performed by: EMERGENCY MEDICINE

## 2020-01-01 PROCEDURE — 65610000006 HC RM INTENSIVE CARE

## 2020-01-01 PROCEDURE — 74011250636 HC RX REV CODE- 250/636: Performed by: PHYSICIAN ASSISTANT

## 2020-01-01 PROCEDURE — 76040000025: Performed by: INTERNAL MEDICINE

## 2020-01-01 PROCEDURE — 74011000258 HC RX REV CODE- 258: Performed by: INTERNAL MEDICINE

## 2020-01-01 PROCEDURE — 85018 HEMOGLOBIN: CPT

## 2020-01-01 PROCEDURE — 77030002520 HC INSRT CLMP LATIS STLTH AMR -B: Performed by: THORACIC SURGERY (CARDIOTHORACIC VASCULAR SURGERY)

## 2020-01-01 PROCEDURE — 77030040829 HC CATH EXT URINE MDII -B

## 2020-01-01 PROCEDURE — 74011250636 HC RX REV CODE- 250/636: Performed by: FAMILY MEDICINE

## 2020-01-01 PROCEDURE — 84100 ASSAY OF PHOSPHORUS: CPT

## 2020-01-01 PROCEDURE — 74011636637 HC RX REV CODE- 636/637: Performed by: THORACIC SURGERY (CARDIOTHORACIC VASCULAR SURGERY)

## 2020-01-01 PROCEDURE — 77010033711 HC HIGH FLOW OXYGEN

## 2020-01-01 PROCEDURE — 77030012699 HC VLV SUC CNTRL OCOA -A: Performed by: INTERNAL MEDICINE

## 2020-01-01 PROCEDURE — 77030019905 HC CATH URETH INTMIT MDII -A

## 2020-01-01 PROCEDURE — 74011250637 HC RX REV CODE- 250/637: Performed by: INTERNAL MEDICINE

## 2020-01-01 PROCEDURE — 94010 BREATHING CAPACITY TEST: CPT

## 2020-01-01 PROCEDURE — 99218 HC RM OBSERVATION: CPT

## 2020-01-01 PROCEDURE — 77030002916 HC SUT ETHLN J&J -A

## 2020-01-01 PROCEDURE — 99233 SBSQ HOSP IP/OBS HIGH 50: CPT | Performed by: INTERNAL MEDICINE

## 2020-01-01 PROCEDURE — 90935 HEMODIALYSIS ONE EVALUATION: CPT

## 2020-01-01 PROCEDURE — 65610000001 HC ROOM ICU GENERAL

## 2020-01-01 PROCEDURE — 77030019952 HC CANSTR VAC ASST KCON -B

## 2020-01-01 PROCEDURE — 90945 DIALYSIS ONE EVALUATION: CPT

## 2020-01-01 PROCEDURE — 77030034888 HC SUT PROL 2 J&J -B: Performed by: THORACIC SURGERY (CARDIOTHORACIC VASCULAR SURGERY)

## 2020-01-01 PROCEDURE — 82803 BLOOD GASES ANY COMBINATION: CPT

## 2020-01-01 PROCEDURE — 77030033649 HC DRSG INCIS MGMT KT KCON -F

## 2020-01-01 PROCEDURE — 93005 ELECTROCARDIOGRAM TRACING: CPT | Performed by: THORACIC SURGERY (CARDIOTHORACIC VASCULAR SURGERY)

## 2020-01-01 PROCEDURE — 87070 CULTURE OTHR SPECIMN AEROBIC: CPT

## 2020-01-01 PROCEDURE — 74011000258 HC RX REV CODE- 258: Performed by: FAMILY MEDICINE

## 2020-01-01 PROCEDURE — C1894 INTRO/SHEATH, NON-LASER: HCPCS

## 2020-01-01 PROCEDURE — 83880 ASSAY OF NATRIURETIC PEPTIDE: CPT

## 2020-01-01 PROCEDURE — 74011250636 HC RX REV CODE- 250/636: Performed by: HOSPITALIST

## 2020-01-01 PROCEDURE — 36430 TRANSFUSION BLD/BLD COMPNT: CPT

## 2020-01-01 PROCEDURE — 74011000258 HC RX REV CODE- 258: Performed by: THORACIC SURGERY (CARDIOTHORACIC VASCULAR SURGERY)

## 2020-01-01 PROCEDURE — 74750000023 HC WOUND THERAPY

## 2020-01-01 PROCEDURE — 74011250636 HC RX REV CODE- 250/636: Performed by: EMERGENCY MEDICINE

## 2020-01-01 PROCEDURE — 96376 TX/PRO/DX INJ SAME DRUG ADON: CPT

## 2020-01-01 PROCEDURE — 84484 ASSAY OF TROPONIN QUANT: CPT

## 2020-01-01 PROCEDURE — BT40ZZZ ULTRASONOGRAPHY OF BLADDER: ICD-10-PCS | Performed by: INTERNAL MEDICINE

## 2020-01-01 PROCEDURE — 5A1955Z RESPIRATORY VENTILATION, GREATER THAN 96 CONSECUTIVE HOURS: ICD-10-PCS | Performed by: THORACIC SURGERY (CARDIOTHORACIC VASCULAR SURGERY)

## 2020-01-01 PROCEDURE — A7000 DISPOSABLE CANISTER FOR PUMP: HCPCS

## 2020-01-01 PROCEDURE — 0651 HSPC ROUTINE HOME CARE

## 2020-01-01 PROCEDURE — 77030025827 HC BG BLD DNR AUTLG MEDT -A: Performed by: THORACIC SURGERY (CARDIOTHORACIC VASCULAR SURGERY)

## 2020-01-01 PROCEDURE — 96374 THER/PROPH/DIAG INJ IV PUSH: CPT

## 2020-01-01 PROCEDURE — 77030018846 HC SOL IRR STRL H20 ICUM -A

## 2020-01-01 PROCEDURE — 5A1223Z PERFORMANCE OF CARDIAC PACING, CONTINUOUS: ICD-10-PCS | Performed by: THORACIC SURGERY (CARDIOTHORACIC VASCULAR SURGERY)

## 2020-01-01 PROCEDURE — 99291 CRITICAL CARE FIRST HOUR: CPT | Performed by: INTERNAL MEDICINE

## 2020-01-01 PROCEDURE — 77030022704 HC SUT VLOC COVD -B

## 2020-01-01 PROCEDURE — 84145 PROCALCITONIN (PCT): CPT

## 2020-01-01 PROCEDURE — 77030013794 HC KT TRNSDUC BLD EDWD -B: Performed by: NURSE ANESTHETIST, CERTIFIED REGISTERED

## 2020-01-01 PROCEDURE — 97162 PT EVAL MOD COMPLEX 30 MIN: CPT

## 2020-01-01 PROCEDURE — 99231 SBSQ HOSP IP/OBS SF/LOW 25: CPT | Performed by: NURSE PRACTITIONER

## 2020-01-01 PROCEDURE — 74011250636 HC RX REV CODE- 250/636: Performed by: ANESTHESIOLOGY

## 2020-01-01 PROCEDURE — 77030041076 HC DRSG AG OPTICELL MDII -A

## 2020-01-01 PROCEDURE — 82728 ASSAY OF FERRITIN: CPT

## 2020-01-01 PROCEDURE — C8929 TTE W OR WO FOL WCON,DOPPLER: HCPCS

## 2020-01-01 PROCEDURE — 77030027138 HC INCENT SPIROMETER -A

## 2020-01-01 PROCEDURE — 77030014008 HC SPNG HEMSTAT J&J -C

## 2020-01-01 PROCEDURE — 77030003422 HC NDL ASPIR NOVO -A: Performed by: THORACIC SURGERY (CARDIOTHORACIC VASCULAR SURGERY)

## 2020-01-01 PROCEDURE — 86900 BLOOD TYPING SEROLOGIC ABO: CPT

## 2020-01-01 PROCEDURE — 85046 RETICYTE/HGB CONCENTRATE: CPT

## 2020-01-01 PROCEDURE — 0JH606Z INSERTION OF PACEMAKER, DUAL CHAMBER INTO CHEST SUBCUTANEOUS TISSUE AND FASCIA, OPEN APPROACH: ICD-10-PCS | Performed by: INTERNAL MEDICINE

## 2020-01-01 PROCEDURE — 77030019605

## 2020-01-01 PROCEDURE — 97168 OT RE-EVAL EST PLAN CARE: CPT

## 2020-01-01 PROCEDURE — G0156 HHCP-SVS OF AIDE,EA 15 MIN: HCPCS

## 2020-01-01 PROCEDURE — 82607 VITAMIN B-12: CPT

## 2020-01-01 PROCEDURE — 71260 CT THORAX DX C+: CPT

## 2020-01-01 PROCEDURE — 02RF08Z REPLACEMENT OF AORTIC VALVE WITH ZOOPLASTIC TISSUE, OPEN APPROACH: ICD-10-PCS | Performed by: THORACIC SURGERY (CARDIOTHORACIC VASCULAR SURGERY)

## 2020-01-01 PROCEDURE — P9016 RBC LEUKOCYTES REDUCED: HCPCS

## 2020-01-01 PROCEDURE — HHS10554 SHAMPOO/BODY WASH 8 OZ ALOE VESTA

## 2020-01-01 PROCEDURE — 77030010938 HC CLP LIG TELE -A: Performed by: THORACIC SURGERY (CARDIOTHORACIC VASCULAR SURGERY)

## 2020-01-01 PROCEDURE — 99152 MOD SED SAME PHYS/QHP 5/>YRS: CPT

## 2020-01-01 PROCEDURE — 77030008771 HC TU NG SALEM SUMP -A

## 2020-01-01 PROCEDURE — 84520 ASSAY OF UREA NITROGEN: CPT

## 2020-01-01 PROCEDURE — 77030003010 HC SUT SURG STL J&J -B: Performed by: THORACIC SURGERY (CARDIOTHORACIC VASCULAR SURGERY)

## 2020-01-01 PROCEDURE — P9047 ALBUMIN (HUMAN), 25%, 50ML: HCPCS | Performed by: INTERNAL MEDICINE

## 2020-01-01 PROCEDURE — 77030041073 HC DRSG SILVASRB MDII -A

## 2020-01-01 PROCEDURE — 81003 URINALYSIS AUTO W/O SCOPE: CPT

## 2020-01-01 PROCEDURE — 77030021668 HC NEB PREFIL KT VYRM -A

## 2020-01-01 PROCEDURE — B24BZZ4 ULTRASONOGRAPHY OF HEART WITH AORTA, TRANSESOPHAGEAL: ICD-10-PCS | Performed by: THORACIC SURGERY (CARDIOTHORACIC VASCULAR SURGERY)

## 2020-01-01 PROCEDURE — 76010010054 HC POST OP PAIN BLOCK: Performed by: SURGERY

## 2020-01-01 PROCEDURE — 36593 DECLOT VASCULAR DEVICE: CPT

## 2020-01-01 PROCEDURE — 74011250636 HC RX REV CODE- 250/636: Performed by: SURGERY

## 2020-01-01 PROCEDURE — 77030040361 HC SLV COMPR DVT MDII -B

## 2020-01-01 PROCEDURE — 81001 URINALYSIS AUTO W/SCOPE: CPT

## 2020-01-01 PROCEDURE — BH4BZZZ ULTRASONOGRAPHY OF CHEST WALL: ICD-10-PCS | Performed by: INTERNAL MEDICINE

## 2020-01-01 PROCEDURE — 87106 FUNGI IDENTIFICATION YEAST: CPT

## 2020-01-01 PROCEDURE — 83605 ASSAY OF LACTIC ACID: CPT

## 2020-01-01 PROCEDURE — 77030018571 HC SUT PROL1 J&J -B: Performed by: THORACIC SURGERY (CARDIOTHORACIC VASCULAR SURGERY)

## 2020-01-01 PROCEDURE — 93312 ECHO TRANSESOPHAGEAL: CPT

## 2020-01-01 PROCEDURE — 76060000044 HC ANESTHESIA 6.5 TO 7 HR: Performed by: THORACIC SURGERY (CARDIOTHORACIC VASCULAR SURGERY)

## 2020-01-01 PROCEDURE — 77030019934 HC DRSG VAC ASST KCON -B

## 2020-01-01 PROCEDURE — 89220 SPUTUM SPECIMEN COLLECTION: CPT

## 2020-01-01 PROCEDURE — 86922 COMPATIBILITY TEST ANTIGLOB: CPT

## 2020-01-01 PROCEDURE — P9045 ALBUMIN (HUMAN), 5%, 250 ML: HCPCS

## 2020-01-01 PROCEDURE — 0BDJ8ZX EXTRACTION OF LEFT LOWER LUNG LOBE, VIA NATURAL OR ARTIFICIAL OPENING ENDOSCOPIC, DIAGNOSTIC: ICD-10-PCS | Performed by: INTERNAL MEDICINE

## 2020-01-01 PROCEDURE — 82248 BILIRUBIN DIRECT: CPT

## 2020-01-01 PROCEDURE — 87086 URINE CULTURE/COLONY COUNT: CPT

## 2020-01-01 PROCEDURE — 87340 HEPATITIS B SURFACE AG IA: CPT

## 2020-01-01 PROCEDURE — 71250 CT THORAX DX C-: CPT

## 2020-01-01 PROCEDURE — 77030010509 HC AIRWY LMA MSK TELE -A: Performed by: ANESTHESIOLOGY

## 2020-01-01 PROCEDURE — 77030031642 HC BOOT FT ROOKE HFS OSBM -B

## 2020-01-01 PROCEDURE — 77030009355 HC CRDPLG DEL SET QUES -C: Performed by: THORACIC SURGERY (CARDIOTHORACIC VASCULAR SURGERY)

## 2020-01-01 PROCEDURE — 86923 COMPATIBILITY TEST ELECTRIC: CPT

## 2020-01-01 PROCEDURE — 77030020263 HC SOL INJ SOD CL0.9% LFCR 1000ML

## 2020-01-01 PROCEDURE — 86704 HEP B CORE ANTIBODY TOTAL: CPT

## 2020-01-01 PROCEDURE — 71046 X-RAY EXAM CHEST 2 VIEWS: CPT

## 2020-01-01 PROCEDURE — 97535 SELF CARE MNGMENT TRAINING: CPT

## 2020-01-01 PROCEDURE — P9045 ALBUMIN (HUMAN), 5%, 250 ML: HCPCS | Performed by: NURSE ANESTHETIST, CERTIFIED REGISTERED

## 2020-01-01 PROCEDURE — 77030016564 HC BLD STRNL SAW4 CNMD -B: Performed by: THORACIC SURGERY (CARDIOTHORACIC VASCULAR SURGERY)

## 2020-01-01 PROCEDURE — T4541 LARGE DISPOSABLE UNDERPAD: HCPCS

## 2020-01-01 PROCEDURE — 87040 BLOOD CULTURE FOR BACTERIA: CPT

## 2020-01-01 PROCEDURE — 77030018548 HC SUT ETHBND2 J&J -B: Performed by: THORACIC SURGERY (CARDIOTHORACIC VASCULAR SURGERY)

## 2020-01-01 PROCEDURE — 74011250637 HC RX REV CODE- 250/637: Performed by: SURGERY

## 2020-01-01 PROCEDURE — 82947 ASSAY GLUCOSE BLOOD QUANT: CPT

## 2020-01-01 PROCEDURE — 51702 INSERT TEMP BLADDER CATH: CPT

## 2020-01-01 PROCEDURE — 02100Z9 BYPASS CORONARY ARTERY, ONE ARTERY FROM LEFT INTERNAL MAMMARY, OPEN APPROACH: ICD-10-PCS | Performed by: THORACIC SURGERY (CARDIOTHORACIC VASCULAR SURGERY)

## 2020-01-01 PROCEDURE — 77030025646 HC AUTOTRNSFUS KT TERU -C: Performed by: THORACIC SURGERY (CARDIOTHORACIC VASCULAR SURGERY)

## 2020-01-01 PROCEDURE — 77030006994: Performed by: THORACIC SURGERY (CARDIOTHORACIC VASCULAR SURGERY)

## 2020-01-01 PROCEDURE — 76210000017 HC OR PH I REC 1.5 TO 2 HR: Performed by: SURGERY

## 2020-01-01 PROCEDURE — 86920 COMPATIBILITY TEST SPIN: CPT

## 2020-01-01 PROCEDURE — 93925 LOWER EXTREMITY STUDY: CPT

## 2020-01-01 PROCEDURE — 97166 OT EVAL MOD COMPLEX 45 MIN: CPT

## 2020-01-01 PROCEDURE — 77030002916 HC SUT ETHLN J&J -A: Performed by: SURGERY

## 2020-01-01 PROCEDURE — 77030008477 HC STYL SATN SLP COVD -A: Performed by: NURSE ANESTHETIST, CERTIFIED REGISTERED

## 2020-01-01 PROCEDURE — 94002 VENT MGMT INPAT INIT DAY: CPT

## 2020-01-01 PROCEDURE — 80076 HEPATIC FUNCTION PANEL: CPT

## 2020-01-01 PROCEDURE — 5A1D70Z PERFORMANCE OF URINARY FILTRATION, INTERMITTENT, LESS THAN 6 HOURS PER DAY: ICD-10-PCS | Performed by: THORACIC SURGERY (CARDIOTHORACIC VASCULAR SURGERY)

## 2020-01-01 PROCEDURE — 87150 DNA/RNA AMPLIFIED PROBE: CPT

## 2020-01-01 PROCEDURE — 77030008771 HC TU NG SALEM SUMP -A: Performed by: NURSE ANESTHETIST, CERTIFIED REGISTERED

## 2020-01-01 PROCEDURE — 85379 FIBRIN DEGRADATION QUANT: CPT

## 2020-01-01 PROCEDURE — 76060000034 HC ANESTHESIA 1.5 TO 2 HR: Performed by: SURGERY

## 2020-01-01 PROCEDURE — 3331090004 HSPC SERVICE INTENSITY ADD-ON

## 2020-01-01 PROCEDURE — 31645 BRNCHSC W/THER ASPIR 1ST: CPT | Performed by: INTERNAL MEDICINE

## 2020-01-01 PROCEDURE — 74011000250 HC RX REV CODE- 250: Performed by: EMERGENCY MEDICINE

## 2020-01-01 PROCEDURE — 77030013064 HC TRNSF BLD FENW -A

## 2020-01-01 PROCEDURE — A4349 DISPOSABLE MALE EXTERNAL CAT: HCPCS

## 2020-01-01 PROCEDURE — 77030020407 HC IV BLD WRMR ST 3M -A: Performed by: NURSE ANESTHETIST, CERTIFIED REGISTERED

## 2020-01-01 PROCEDURE — C1785 PMKR, DUAL, RATE-RESP: HCPCS

## 2020-01-01 PROCEDURE — 86140 C-REACTIVE PROTEIN: CPT

## 2020-01-01 PROCEDURE — 97605 NEG PRS WND THER DME<=50SQCM: CPT

## 2020-01-01 PROCEDURE — 77030018836 HC SOL IRR NACL ICUM -A: Performed by: THORACIC SURGERY (CARDIOTHORACIC VASCULAR SURGERY)

## 2020-01-01 PROCEDURE — C1769 GUIDE WIRE: HCPCS | Performed by: THORACIC SURGERY (CARDIOTHORACIC VASCULAR SURGERY)

## 2020-01-01 PROCEDURE — 74011000302 HC RX REV CODE- 302: Performed by: INTERNAL MEDICINE

## 2020-01-01 PROCEDURE — 77030041244 HC CBL PACE EXT TEMP REMG -B: Performed by: THORACIC SURGERY (CARDIOTHORACIC VASCULAR SURGERY)

## 2020-01-01 PROCEDURE — 86022 PLATELET ANTIBODIES: CPT

## 2020-01-01 PROCEDURE — 82746 ASSAY OF FOLIC ACID SERUM: CPT

## 2020-01-01 PROCEDURE — 02H63JZ INSERTION OF PACEMAKER LEAD INTO RIGHT ATRIUM, PERCUTANEOUS APPROACH: ICD-10-PCS | Performed by: INTERNAL MEDICINE

## 2020-01-01 PROCEDURE — 0JH63XZ INSERTION OF TUNNELED VASCULAR ACCESS DEVICE INTO CHEST SUBCUTANEOUS TISSUE AND FASCIA, PERCUTANEOUS APPROACH: ICD-10-PCS | Performed by: RADIOLOGY

## 2020-01-01 PROCEDURE — 77030014007 HC SPNG HEMSTAT J&J -B: Performed by: THORACIC SURGERY (CARDIOTHORACIC VASCULAR SURGERY)

## 2020-01-01 PROCEDURE — 87641 MR-STAPH DNA AMP PROBE: CPT

## 2020-01-01 PROCEDURE — 74011000250 HC RX REV CODE- 250

## 2020-01-01 PROCEDURE — 87186 SC STD MICRODIL/AGAR DIL: CPT

## 2020-01-01 PROCEDURE — G0155 HHCP-SVS OF CSW,EA 15 MIN: HCPCS

## 2020-01-01 PROCEDURE — 97164 PT RE-EVAL EST PLAN CARE: CPT

## 2020-01-01 PROCEDURE — 74011000272 HC RX REV CODE- 272: Performed by: THORACIC SURGERY (CARDIOTHORACIC VASCULAR SURGERY)

## 2020-01-01 PROCEDURE — C1893 INTRO/SHEATH, FIXED,NON-PEEL: HCPCS

## 2020-01-01 PROCEDURE — 99223 1ST HOSP IP/OBS HIGH 75: CPT | Performed by: INTERNAL MEDICINE

## 2020-01-01 PROCEDURE — 77030002987 HC SUT PROL J&J -B: Performed by: THORACIC SURGERY (CARDIOTHORACIC VASCULAR SURGERY)

## 2020-01-01 PROCEDURE — 77030018836 HC SOL IRR NACL ICUM -A: Performed by: SURGERY

## 2020-01-01 PROCEDURE — 77030041070 HC DRSG ALG MDII -A

## 2020-01-01 PROCEDURE — 94761 N-INVAS EAR/PLS OXIMETRY MLT: CPT

## 2020-01-01 PROCEDURE — 33208 INSRT HEART PM ATRIAL & VENT: CPT

## 2020-01-01 PROCEDURE — 74011000302 HC RX REV CODE- 302: Performed by: THORACIC SURGERY (CARDIOTHORACIC VASCULAR SURGERY)

## 2020-01-01 PROCEDURE — 93005 ELECTROCARDIOGRAM TRACING: CPT | Performed by: PHYSICIAN ASSISTANT

## 2020-01-01 PROCEDURE — 76010000155 HC AUTO TRANSFUSION/CELL SAVER: Performed by: THORACIC SURGERY (CARDIOTHORACIC VASCULAR SURGERY)

## 2020-01-01 PROCEDURE — 83615 LACTATE (LD) (LDH) ENZYME: CPT

## 2020-01-01 PROCEDURE — 87205 SMEAR GRAM STAIN: CPT

## 2020-01-01 PROCEDURE — 76775 US EXAM ABDO BACK WALL LIM: CPT

## 2020-01-01 PROCEDURE — 97116 GAIT TRAINING THERAPY: CPT

## 2020-01-01 PROCEDURE — 74011250637 HC RX REV CODE- 250/637

## 2020-01-01 PROCEDURE — 51798 US URINE CAPACITY MEASURE: CPT

## 2020-01-01 PROCEDURE — 74640000003 HC CRRT SET UP OR EXCHANGE

## 2020-01-01 PROCEDURE — 77030012390 HC DRN CHST BTL GTNG -B: Performed by: THORACIC SURGERY (CARDIOTHORACIC VASCULAR SURGERY)

## 2020-01-01 PROCEDURE — 99223 1ST HOSP IP/OBS HIGH 75: CPT | Performed by: NURSE PRACTITIONER

## 2020-01-01 PROCEDURE — 77030033068 HC DEV COR-KNOT SUT KT LSIS -E: Performed by: THORACIC SURGERY (CARDIOTHORACIC VASCULAR SURGERY)

## 2020-01-01 PROCEDURE — 77030018798 HC PMP KT ENTRL FED COVD -A

## 2020-01-01 PROCEDURE — 5A1D90Z PERFORMANCE OF URINARY FILTRATION, CONTINUOUS, GREATER THAN 18 HOURS PER DAY: ICD-10-PCS | Performed by: THORACIC SURGERY (CARDIOTHORACIC VASCULAR SURGERY)

## 2020-01-01 PROCEDURE — 74011250636 HC RX REV CODE- 250/636: Performed by: REGISTERED NURSE

## 2020-01-01 PROCEDURE — 93005 ELECTROCARDIOGRAM TRACING: CPT | Performed by: EMERGENCY MEDICINE

## 2020-01-01 PROCEDURE — A4927 NON-STERILE GLOVES: HCPCS

## 2020-01-01 PROCEDURE — 77030041247 HC PROTECTOR HEEL HEELMEDIX MDII -B

## 2020-01-01 PROCEDURE — 96375 TX/PRO/DX INJ NEW DRUG ADDON: CPT

## 2020-01-01 PROCEDURE — C1751 CATH, INF, PER/CENT/MIDLINE: HCPCS | Performed by: NURSE ANESTHETIST, CERTIFIED REGISTERED

## 2020-01-01 PROCEDURE — 77030018547 HC SUT ETHBND1 J&J -B: Performed by: THORACIC SURGERY (CARDIOTHORACIC VASCULAR SURGERY)

## 2020-01-01 PROCEDURE — 88305 TISSUE EXAM BY PATHOLOGIST: CPT

## 2020-01-01 PROCEDURE — 77030013861 HC PNCH AORT CLNCUT QUES -B: Performed by: THORACIC SURGERY (CARDIOTHORACIC VASCULAR SURGERY)

## 2020-01-01 PROCEDURE — 84443 ASSAY THYROID STIM HORMONE: CPT

## 2020-01-01 PROCEDURE — 77030010507 HC ADH SKN DERMBND J&J -B

## 2020-01-01 PROCEDURE — 81015 MICROSCOPIC EXAM OF URINE: CPT

## 2020-01-01 PROCEDURE — 3336500001 HSPC ELECTION

## 2020-01-01 PROCEDURE — 85384 FIBRINOGEN ACTIVITY: CPT

## 2020-01-01 PROCEDURE — 021109W BYPASS CORONARY ARTERY, TWO ARTERIES FROM AORTA WITH AUTOLOGOUS VENOUS TISSUE, OPEN APPROACH: ICD-10-PCS | Performed by: THORACIC SURGERY (CARDIOTHORACIC VASCULAR SURGERY)

## 2020-01-01 PROCEDURE — 0BH17EZ INSERTION OF ENDOTRACHEAL AIRWAY INTO TRACHEA, VIA NATURAL OR ARTIFICIAL OPENING: ICD-10-PCS | Performed by: THORACIC SURGERY (CARDIOTHORACIC VASCULAR SURGERY)

## 2020-01-01 PROCEDURE — 77030018793 HC ORG SUT GAB FRAT TELE -B: Performed by: THORACIC SURGERY (CARDIOTHORACIC VASCULAR SURGERY)

## 2020-01-01 PROCEDURE — 84478 ASSAY OF TRIGLYCERIDES: CPT

## 2020-01-01 PROCEDURE — 73620 X-RAY EXAM OF FOOT: CPT

## 2020-01-01 PROCEDURE — 30233N1 TRANSFUSION OF NONAUTOLOGOUS RED BLOOD CELLS INTO PERIPHERAL VEIN, PERCUTANEOUS APPROACH: ICD-10-PCS | Performed by: THORACIC SURGERY (CARDIOTHORACIC VASCULAR SURGERY)

## 2020-01-01 PROCEDURE — P9047 ALBUMIN (HUMAN), 25%, 50ML: HCPCS

## 2020-01-01 PROCEDURE — 85652 RBC SED RATE AUTOMATED: CPT

## 2020-01-01 PROCEDURE — 99215 OFFICE O/P EST HI 40 MIN: CPT | Performed by: INTERNAL MEDICINE

## 2020-01-01 PROCEDURE — 77030003037 HC SUT WRE STRNOTMY AEMC -B: Performed by: THORACIC SURGERY (CARDIOTHORACIC VASCULAR SURGERY)

## 2020-01-01 PROCEDURE — 93880 EXTRACRANIAL BILAT STUDY: CPT

## 2020-01-01 PROCEDURE — 83036 HEMOGLOBIN GLYCOSYLATED A1C: CPT

## 2020-01-01 PROCEDURE — 77030033649 HC DRSG INCIS MGMT KT KCON -F: Performed by: THORACIC SURGERY (CARDIOTHORACIC VASCULAR SURGERY)

## 2020-01-01 PROCEDURE — 94664 DEMO&/EVAL PT USE INHALER: CPT

## 2020-01-01 PROCEDURE — 77030010813: Performed by: THORACIC SURGERY (CARDIOTHORACIC VASCULAR SURGERY)

## 2020-01-01 PROCEDURE — 77030018729 HC ELECTRD DEFIB PAD CARD -B: Performed by: THORACIC SURGERY (CARDIOTHORACIC VASCULAR SURGERY)

## 2020-01-01 PROCEDURE — 02HK3JZ INSERTION OF PACEMAKER LEAD INTO RIGHT VENTRICLE, PERCUTANEOUS APPROACH: ICD-10-PCS | Performed by: INTERNAL MEDICINE

## 2020-01-01 PROCEDURE — 76210000006 HC OR PH I REC 0.5 TO 1 HR: Performed by: SURGERY

## 2020-01-01 PROCEDURE — 86580 TB INTRADERMAL TEST: CPT | Performed by: INTERNAL MEDICINE

## 2020-01-01 PROCEDURE — 77030022953: Performed by: THORACIC SURGERY (CARDIOTHORACIC VASCULAR SURGERY)

## 2020-01-01 PROCEDURE — 74011000258 HC RX REV CODE- 258: Performed by: NURSE ANESTHETIST, CERTIFIED REGISTERED

## 2020-01-01 PROCEDURE — 85049 AUTOMATED PLATELET COUNT: CPT

## 2020-01-01 PROCEDURE — 74011000258 HC RX REV CODE- 258

## 2020-01-01 PROCEDURE — 77030010827: Performed by: THORACIC SURGERY (CARDIOTHORACIC VASCULAR SURGERY)

## 2020-01-01 PROCEDURE — 77030031139 HC SUT VCRL2 J&J -A: Performed by: SURGERY

## 2020-01-01 PROCEDURE — 77030013292 HC BOWL MX PRSM J&J -A: Performed by: NURSE ANESTHETIST, CERTIFIED REGISTERED

## 2020-01-01 PROCEDURE — 76010000219 HC CV SURG 6 TO 6.5 HR INTENSV-TIER 1: Performed by: THORACIC SURGERY (CARDIOTHORACIC VASCULAR SURGERY)

## 2020-01-01 PROCEDURE — 06BQ0ZZ EXCISION OF LEFT SAPHENOUS VEIN, OPEN APPROACH: ICD-10-PCS | Performed by: THORACIC SURGERY (CARDIOTHORACIC VASCULAR SURGERY)

## 2020-01-01 PROCEDURE — 77030031139 HC SUT VCRL2 J&J -A: Performed by: THORACIC SURGERY (CARDIOTHORACIC VASCULAR SURGERY)

## 2020-01-01 PROCEDURE — 5A1D70Z PERFORMANCE OF URINARY FILTRATION, INTERMITTENT, LESS THAN 6 HOURS PER DAY: ICD-10-PCS | Performed by: INTERNAL MEDICINE

## 2020-01-01 PROCEDURE — 36591 DRAW BLOOD OFF VENOUS DEVICE: CPT

## 2020-01-01 PROCEDURE — 87077 CULTURE AEROBIC IDENTIFY: CPT

## 2020-01-01 PROCEDURE — 74011636320 HC RX REV CODE- 636/320: Performed by: INTERNAL MEDICINE

## 2020-01-01 PROCEDURE — 77030020253 HC SOL INJ D545NS .05 DEX .45 SAL

## 2020-01-01 PROCEDURE — 77030025869: Performed by: THORACIC SURGERY (CARDIOTHORACIC VASCULAR SURGERY)

## 2020-01-01 PROCEDURE — 30233N1 TRANSFUSION OF NONAUTOLOGOUS RED BLOOD CELLS INTO PERIPHERAL VEIN, PERCUTANEOUS APPROACH: ICD-10-PCS | Performed by: FAMILY MEDICINE

## 2020-01-01 PROCEDURE — 77030033069 HC RLD QLC SGL COR-KNOT LSIS -B: Performed by: THORACIC SURGERY (CARDIOTHORACIC VASCULAR SURGERY)

## 2020-01-01 PROCEDURE — 77030020751 HC FLTR TBNG TRNSFUS HAEM -A: Performed by: THORACIC SURGERY (CARDIOTHORACIC VASCULAR SURGERY)

## 2020-01-01 PROCEDURE — 77030005518 HC CATH URETH FOL 2W BARD -B: Performed by: THORACIC SURGERY (CARDIOTHORACIC VASCULAR SURGERY)

## 2020-01-01 PROCEDURE — 86921 COMPATIBILITY TEST INCUBATE: CPT

## 2020-01-01 PROCEDURE — 77030005537 HC CATH URETH BARD -A: Performed by: THORACIC SURGERY (CARDIOTHORACIC VASCULAR SURGERY)

## 2020-01-01 PROCEDURE — 2W0MX6Z CHANGE PRESSURE DRESSING ON LEFT LOWER EXTREMITY: ICD-10-PCS | Performed by: THORACIC SURGERY (CARDIOTHORACIC VASCULAR SURGERY)

## 2020-01-01 PROCEDURE — 99214 OFFICE O/P EST MOD 30 MIN: CPT | Performed by: INTERNAL MEDICINE

## 2020-01-01 PROCEDURE — 97161 PT EVAL LOW COMPLEX 20 MIN: CPT

## 2020-01-01 PROCEDURE — 94762 N-INVAS EAR/PLS OXIMTRY CONT: CPT

## 2020-01-01 PROCEDURE — 82310 ASSAY OF CALCIUM: CPT

## 2020-01-01 PROCEDURE — 97112 NEUROMUSCULAR REEDUCATION: CPT

## 2020-01-01 PROCEDURE — 77030019908 HC STETH ESOPH SIMS -A: Performed by: NURSE ANESTHETIST, CERTIFIED REGISTERED

## 2020-01-01 PROCEDURE — 74011000258 HC RX REV CODE- 258: Performed by: EMERGENCY MEDICINE

## 2020-01-01 PROCEDURE — C1729 CATH, DRAINAGE: HCPCS | Performed by: THORACIC SURGERY (CARDIOTHORACIC VASCULAR SURGERY)

## 2020-01-01 PROCEDURE — 94669 MECHANICAL CHEST WALL OSCILL: CPT

## 2020-01-01 PROCEDURE — 74011000302 HC RX REV CODE- 302: Performed by: FAMILY MEDICINE

## 2020-01-01 PROCEDURE — 82550 ASSAY OF CK (CPK): CPT

## 2020-01-01 PROCEDURE — 93306 TTE W/DOPPLER COMPLETE: CPT

## 2020-01-01 PROCEDURE — 89051 BODY FLUID CELL COUNT: CPT

## 2020-01-01 PROCEDURE — 80051 ELECTROLYTE PANEL: CPT

## 2020-01-01 PROCEDURE — 77030002986 HC SUT PROL J&J -A: Performed by: THORACIC SURGERY (CARDIOTHORACIC VASCULAR SURGERY)

## 2020-01-01 PROCEDURE — 74011636320 HC RX REV CODE- 636/320: Performed by: FAMILY MEDICINE

## 2020-01-01 PROCEDURE — 74011000250 HC RX REV CODE- 250: Performed by: ANESTHESIOLOGY

## 2020-01-01 PROCEDURE — 74011000250 HC RX REV CODE- 250: Performed by: REGISTERED NURSE

## 2020-01-01 PROCEDURE — 74011000250 HC RX REV CODE- 250: Performed by: FAMILY MEDICINE

## 2020-01-01 PROCEDURE — B24BZZ4 ULTRASONOGRAPHY OF HEART WITH AORTA, TRANSESOPHAGEAL: ICD-10-PCS | Performed by: INTERNAL MEDICINE

## 2020-01-01 PROCEDURE — 77030034936 HC DEV MIN COR-KNOT KT LSIS -F: Performed by: THORACIC SURGERY (CARDIOTHORACIC VASCULAR SURGERY)

## 2020-01-01 PROCEDURE — 93971 EXTREMITY STUDY: CPT

## 2020-01-01 PROCEDURE — 77030005401 HC CATH RAD ARRO -A: Performed by: NURSE ANESTHETIST, CERTIFIED REGISTERED

## 2020-01-01 PROCEDURE — 93005 ELECTROCARDIOGRAM TRACING: CPT | Performed by: INTERNAL MEDICINE

## 2020-01-01 PROCEDURE — 77030034927 HC PK PROC CPB INSPIRE PERF LIVA -F: Performed by: THORACIC SURGERY (CARDIOTHORACIC VASCULAR SURGERY)

## 2020-01-01 PROCEDURE — 77030002966 HC SUT PDS J&J -A: Performed by: THORACIC SURGERY (CARDIOTHORACIC VASCULAR SURGERY)

## 2020-01-01 PROCEDURE — 82542 COL CHROMOTOGRAPHY QUAL/QUAN: CPT

## 2020-01-01 PROCEDURE — 77030003034 HC SUT WRE CRD J&J -B: Performed by: THORACIC SURGERY (CARDIOTHORACIC VASCULAR SURGERY)

## 2020-01-01 PROCEDURE — 76010000153 HC OR TIME 1.5 TO 2 HR: Performed by: SURGERY

## 2020-01-01 PROCEDURE — 77030018667 ADMN ST IV BLD FENW -A

## 2020-01-01 PROCEDURE — 74011250636 HC RX REV CODE- 250/636: Performed by: NURSE ANESTHETIST, CERTIFIED REGISTERED

## 2020-01-01 PROCEDURE — 77030037759

## 2020-01-01 PROCEDURE — 76450000000

## 2020-01-01 PROCEDURE — 77030003602 HC NDL NRV BLK BBMI -B: Performed by: ANESTHESIOLOGY

## 2020-01-01 PROCEDURE — A9270 NON-COVERED ITEM OR SERVICE: HCPCS

## 2020-01-01 PROCEDURE — 93970 EXTREMITY STUDY: CPT

## 2020-01-01 PROCEDURE — 77030002996 HC SUT SLK J&J -A: Performed by: THORACIC SURGERY (CARDIOTHORACIC VASCULAR SURGERY)

## 2020-01-01 PROCEDURE — 83540 ASSAY OF IRON: CPT

## 2020-01-01 PROCEDURE — 86580 TB INTRADERMAL TEST: CPT | Performed by: FAMILY MEDICINE

## 2020-01-01 PROCEDURE — 02H633Z INSERTION OF INFUSION DEVICE INTO RIGHT ATRIUM, PERCUTANEOUS APPROACH: ICD-10-PCS | Performed by: RADIOLOGY

## 2020-01-01 PROCEDURE — C1898 LEAD, PMKR, OTHER THAN TRANS: HCPCS

## 2020-01-01 PROCEDURE — 77030009995: Performed by: THORACIC SURGERY (CARDIOTHORACIC VASCULAR SURGERY)

## 2020-01-01 PROCEDURE — 77030020751 HC FLTR TBNG TRNSFUS HAEM -A: Performed by: NURSE ANESTHETIST, CERTIFIED REGISTERED

## 2020-01-01 PROCEDURE — 83010 ASSAY OF HAPTOGLOBIN QUANT: CPT

## 2020-01-01 PROCEDURE — 77030002970 HC SUT PLEDG TELE -A: Performed by: THORACIC SURGERY (CARDIOTHORACIC VASCULAR SURGERY)

## 2020-01-01 PROCEDURE — 77030013797 HC KT TRNSDUC PRSSR EDWD -A: Performed by: THORACIC SURGERY (CARDIOTHORACIC VASCULAR SURGERY)

## 2020-01-01 PROCEDURE — 77030002888 HC SUT CHRMC J&J -A: Performed by: THORACIC SURGERY (CARDIOTHORACIC VASCULAR SURGERY)

## 2020-01-01 PROCEDURE — 70450 CT HEAD/BRAIN W/O DYE: CPT

## 2020-01-01 PROCEDURE — 92610 EVALUATE SWALLOWING FUNCTION: CPT

## 2020-01-01 PROCEDURE — 77030031131 HC SUT MXN P COVD -B

## 2020-01-01 PROCEDURE — 77030039023: Performed by: THORACIC SURGERY (CARDIOTHORACIC VASCULAR SURGERY)

## 2020-01-01 PROCEDURE — 77030039425 HC BLD LARYNG TRULITE DISP TELE -A: Performed by: NURSE ANESTHETIST, CERTIFIED REGISTERED

## 2020-01-01 PROCEDURE — 74011250637 HC RX REV CODE- 250/637: Performed by: ANESTHESIOLOGY

## 2020-01-01 PROCEDURE — 99152 MOD SED SAME PHYS/QHP 5/>YRS: CPT | Performed by: INTERNAL MEDICINE

## 2020-01-01 PROCEDURE — A6260 WOUND CLEANSER ANY TYPE/SIZE: HCPCS

## 2020-01-01 PROCEDURE — 77030021177: Performed by: THORACIC SURGERY (CARDIOTHORACIC VASCULAR SURGERY)

## 2020-01-01 PROCEDURE — 82533 TOTAL CORTISOL: CPT

## 2020-01-01 PROCEDURE — 77001 FLUOROGUIDE FOR VEIN DEVICE: CPT

## 2020-01-01 PROCEDURE — 74011000250 HC RX REV CODE- 250: Performed by: RADIOLOGY

## 2020-01-01 PROCEDURE — 5A1221Z PERFORMANCE OF CARDIAC OUTPUT, CONTINUOUS: ICD-10-PCS | Performed by: THORACIC SURGERY (CARDIOTHORACIC VASCULAR SURGERY)

## 2020-01-01 PROCEDURE — 99213 OFFICE O/P EST LOW 20 MIN: CPT | Performed by: INTERNAL MEDICINE

## 2020-01-01 PROCEDURE — 77030034850

## 2020-01-01 PROCEDURE — 99356 PR PROLONGED SVC I/P OR OBS SETTING 1ST HOUR: CPT | Performed by: NURSE PRACTITIONER

## 2020-01-01 PROCEDURE — 97165 OT EVAL LOW COMPLEX 30 MIN: CPT

## 2020-01-01 PROCEDURE — 77030018719 HC DRSG PTCH ANTIMIC J&J -A

## 2020-01-01 PROCEDURE — 76010000138 HC OR TIME 0.5 TO 1 HR: Performed by: SURGERY

## 2020-01-01 PROCEDURE — 86580 TB INTRADERMAL TEST: CPT | Performed by: THORACIC SURGERY (CARDIOTHORACIC VASCULAR SURGERY)

## 2020-01-01 PROCEDURE — 77030012390 HC DRN CHST BTL GTNG -B

## 2020-01-01 PROCEDURE — 96365 THER/PROPH/DIAG IV INF INIT: CPT

## 2020-01-01 PROCEDURE — 86706 HEP B SURFACE ANTIBODY: CPT

## 2020-01-01 PROCEDURE — 77030016152 HC PRSSR SYS POS VYRM -B

## 2020-01-01 PROCEDURE — 77030008703 HC TU ET UNCUF COVD -A: Performed by: NURSE ANESTHETIST, CERTIFIED REGISTERED

## 2020-01-01 PROCEDURE — 77030018579 HC SUT TICRN1 COVD -B

## 2020-01-01 PROCEDURE — 3E1038Z IRRIGATION OF SKIN AND MUCOUS MEMBRANES USING IRRIGATING SUBSTANCE, PERCUTANEOUS APPROACH: ICD-10-PCS | Performed by: SURGERY

## 2020-01-01 DEVICE — IMPLANTABLE DEVICE: Type: IMPLANTABLE DEVICE | Site: AORTIC VALVE | Status: FUNCTIONAL

## 2020-01-01 RX ORDER — PROTAMINE SULFATE 10 MG/ML
INJECTION, SOLUTION INTRAVENOUS AS NEEDED
Status: DISCONTINUED | OUTPATIENT
Start: 2020-01-01 | End: 2020-01-01 | Stop reason: HOSPADM

## 2020-01-01 RX ORDER — ALPRAZOLAM 0.5 MG/1
0.5 TABLET ORAL
Status: DISCONTINUED | OUTPATIENT
Start: 2020-01-01 | End: 2020-01-01 | Stop reason: HOSPADM

## 2020-01-01 RX ORDER — FENTANYL CITRATE 50 UG/ML
100 INJECTION, SOLUTION INTRAMUSCULAR; INTRAVENOUS ONCE
Status: DISCONTINUED | OUTPATIENT
Start: 2020-01-01 | End: 2020-01-01 | Stop reason: HOSPADM

## 2020-01-01 RX ORDER — BUSPIRONE HYDROCHLORIDE 10 MG/1
10 TABLET ORAL 3 TIMES DAILY
Status: DISCONTINUED | OUTPATIENT
Start: 2020-01-01 | End: 2020-01-01

## 2020-01-01 RX ORDER — VANCOMYCIN 2 GRAM/500 ML IN 0.9 % SODIUM CHLORIDE INTRAVENOUS
2000 ONCE
Status: COMPLETED | OUTPATIENT
Start: 2020-01-01 | End: 2020-01-01

## 2020-01-01 RX ORDER — TAMSULOSIN HYDROCHLORIDE 0.4 MG/1
0.4 CAPSULE ORAL DAILY
Status: DISCONTINUED | OUTPATIENT
Start: 2020-01-01 | End: 2020-01-01 | Stop reason: HOSPADM

## 2020-01-01 RX ORDER — INSULIN GLARGINE 100 [IU]/ML
18 INJECTION, SOLUTION SUBCUTANEOUS
Qty: 1 VIAL | Refills: 0 | Status: SHIPPED
Start: 2020-01-01 | End: 2020-01-01

## 2020-01-01 RX ORDER — INSULIN GLARGINE 100 [IU]/ML
28 INJECTION, SOLUTION SUBCUTANEOUS DAILY
Qty: 1 VIAL | Refills: 0 | Status: SHIPPED
Start: 2020-01-01 | End: 2020-01-01

## 2020-01-01 RX ORDER — BUPROPION HYDROCHLORIDE 75 MG/1
75 TABLET ORAL 2 TIMES DAILY
Status: DISCONTINUED | OUTPATIENT
Start: 2020-01-01 | End: 2020-01-01 | Stop reason: HOSPADM

## 2020-01-01 RX ORDER — SODIUM CHLORIDE 0.9 % (FLUSH) 0.9 %
5-40 SYRINGE (ML) INJECTION EVERY 8 HOURS
Status: DISCONTINUED | OUTPATIENT
Start: 2020-01-01 | End: 2020-01-01 | Stop reason: HOSPADM

## 2020-01-01 RX ORDER — MIDAZOLAM HYDROCHLORIDE 1 MG/ML
.5-2 INJECTION, SOLUTION INTRAMUSCULAR; INTRAVENOUS
Status: DISCONTINUED | OUTPATIENT
Start: 2020-01-01 | End: 2020-01-01

## 2020-01-01 RX ORDER — VANCOMYCIN/0.9 % SOD CHLORIDE 750 MG/250
750 PLASTIC BAG, INJECTION (ML) INTRAVENOUS EVERY 24 HOURS
Status: DISCONTINUED | OUTPATIENT
Start: 2020-01-01 | End: 2020-01-01

## 2020-01-01 RX ORDER — SERTRALINE HYDROCHLORIDE 50 MG/1
100 TABLET, FILM COATED ORAL DAILY
Status: DISCONTINUED | OUTPATIENT
Start: 2020-01-01 | End: 2020-01-01 | Stop reason: HOSPADM

## 2020-01-01 RX ORDER — SODIUM CHLORIDE 9 MG/ML
250 INJECTION, SOLUTION INTRAVENOUS AS NEEDED
Status: DISCONTINUED | OUTPATIENT
Start: 2020-01-01 | End: 2020-01-01 | Stop reason: HOSPADM

## 2020-01-01 RX ORDER — SODIUM BICARBONATE 84 MG/ML
50 INJECTION, SOLUTION INTRAVENOUS ONCE
Status: COMPLETED | OUTPATIENT
Start: 2020-01-01 | End: 2020-01-01

## 2020-01-01 RX ORDER — SODIUM CHLORIDE, SODIUM LACTATE, POTASSIUM CHLORIDE, CALCIUM CHLORIDE 600; 310; 30; 20 MG/100ML; MG/100ML; MG/100ML; MG/100ML
50 INJECTION, SOLUTION INTRAVENOUS CONTINUOUS
Status: CANCELLED | OUTPATIENT
Start: 2020-01-01

## 2020-01-01 RX ORDER — INSULIN LISPRO 100 [IU]/ML
INJECTION, SOLUTION INTRAVENOUS; SUBCUTANEOUS
Status: DISCONTINUED | OUTPATIENT
Start: 2020-01-01 | End: 2020-01-01 | Stop reason: HOSPADM

## 2020-01-01 RX ORDER — INSULIN GLARGINE 100 [IU]/ML
10 INJECTION, SOLUTION SUBCUTANEOUS DAILY
Status: DISCONTINUED | OUTPATIENT
Start: 2020-01-01 | End: 2020-01-01

## 2020-01-01 RX ORDER — ONDANSETRON 2 MG/ML
4 INJECTION INTRAMUSCULAR; INTRAVENOUS
Status: DISCONTINUED | OUTPATIENT
Start: 2020-01-01 | End: 2020-01-01 | Stop reason: HOSPADM

## 2020-01-01 RX ORDER — TRAMADOL HYDROCHLORIDE 50 MG/1
50 TABLET ORAL
Qty: 30 TAB | Refills: 0 | Status: SHIPPED | OUTPATIENT
Start: 2020-01-01 | End: 2020-01-01

## 2020-01-01 RX ORDER — DIPHENHYDRAMINE HCL 25 MG
25 CAPSULE ORAL
Status: DISCONTINUED | OUTPATIENT
Start: 2020-01-01 | End: 2020-01-01 | Stop reason: HOSPADM

## 2020-01-01 RX ORDER — SODIUM CHLORIDE 9 MG/ML
25 INJECTION, SOLUTION INTRAVENOUS CONTINUOUS
Status: DISCONTINUED | OUTPATIENT
Start: 2020-01-01 | End: 2020-01-01

## 2020-01-01 RX ORDER — LORATADINE 10 MG/1
10 TABLET ORAL
Status: DISCONTINUED | OUTPATIENT
Start: 2020-01-01 | End: 2020-01-01

## 2020-01-01 RX ORDER — VANCOMYCIN 1.75 GRAM/500 ML IN 0.9 % SODIUM CHLORIDE INTRAVENOUS
1750 ONCE
Status: COMPLETED | OUTPATIENT
Start: 2020-01-01 | End: 2020-01-01

## 2020-01-01 RX ORDER — FUROSEMIDE 10 MG/ML
40 INJECTION INTRAMUSCULAR; INTRAVENOUS 2 TIMES DAILY
Status: DISCONTINUED | OUTPATIENT
Start: 2020-01-01 | End: 2020-01-01

## 2020-01-01 RX ORDER — PAPAVERINE HYDROCHLORIDE 30 MG/ML
INJECTION INTRAMUSCULAR; INTRAVENOUS AS NEEDED
Status: DISCONTINUED | OUTPATIENT
Start: 2020-01-01 | End: 2020-01-01 | Stop reason: HOSPADM

## 2020-01-01 RX ORDER — MORPHINE SULFATE 10 MG/ML
4 INJECTION, SOLUTION INTRAMUSCULAR; INTRAVENOUS
Status: DISCONTINUED | OUTPATIENT
Start: 2020-01-01 | End: 2020-01-01

## 2020-01-01 RX ORDER — POTASSIUM CHLORIDE 14.9 MG/ML
10 INJECTION INTRAVENOUS ONCE
Status: COMPLETED | OUTPATIENT
Start: 2020-01-01 | End: 2020-01-01

## 2020-01-01 RX ORDER — WARFARIN SODIUM 5 MG/1
5 TABLET ORAL EVERY EVENING
Status: DISCONTINUED | OUTPATIENT
Start: 2020-01-01 | End: 2020-01-01

## 2020-01-01 RX ORDER — MAGNESIUM SULFATE HEPTAHYDRATE 40 MG/ML
2 INJECTION, SOLUTION INTRAVENOUS ONCE
Status: COMPLETED | OUTPATIENT
Start: 2020-01-01 | End: 2020-01-01

## 2020-01-01 RX ORDER — POLYETHYLENE GLYCOL 3350 17 G/17G
17 POWDER, FOR SOLUTION ORAL DAILY
Qty: 170 G | Refills: 0 | Status: SHIPPED | OUTPATIENT
Start: 2020-01-01 | End: 2020-01-01

## 2020-01-01 RX ORDER — WARFARIN 2.5 MG/1
2.5 TABLET ORAL EVERY EVENING
Status: DISCONTINUED | OUTPATIENT
Start: 2020-01-01 | End: 2020-01-01 | Stop reason: HOSPADM

## 2020-01-01 RX ORDER — TRIAMCINOLONE ACETONIDE 1 MG/G
CREAM TOPICAL 2 TIMES DAILY
Status: DISCONTINUED | OUTPATIENT
Start: 2020-01-01 | End: 2020-01-01 | Stop reason: HOSPADM

## 2020-01-01 RX ORDER — ALPRAZOLAM 0.25 MG/1
0.25 TABLET ORAL
Status: DISCONTINUED | OUTPATIENT
Start: 2020-01-01 | End: 2020-01-01

## 2020-01-01 RX ORDER — INSULIN GLARGINE 100 [IU]/ML
18 INJECTION, SOLUTION SUBCUTANEOUS
Status: DISCONTINUED | OUTPATIENT
Start: 2020-01-01 | End: 2020-01-01 | Stop reason: HOSPADM

## 2020-01-01 RX ORDER — INSULIN LISPRO 100 [IU]/ML
4 INJECTION, SOLUTION INTRAVENOUS; SUBCUTANEOUS
Status: DISCONTINUED | OUTPATIENT
Start: 2020-01-01 | End: 2020-01-01

## 2020-01-01 RX ORDER — DEXAMETHASONE SODIUM PHOSPHATE 4 MG/ML
INJECTION, SOLUTION INTRA-ARTICULAR; INTRALESIONAL; INTRAMUSCULAR; INTRAVENOUS; SOFT TISSUE AS NEEDED
Status: DISCONTINUED | OUTPATIENT
Start: 2020-01-01 | End: 2020-01-01 | Stop reason: HOSPADM

## 2020-01-01 RX ORDER — ONDANSETRON 2 MG/ML
4 INJECTION INTRAMUSCULAR; INTRAVENOUS
Status: DISCONTINUED | OUTPATIENT
Start: 2020-01-01 | End: 2020-01-01

## 2020-01-01 RX ORDER — PHYTONADIONE 5 MG/1
2.5 TABLET ORAL
Status: COMPLETED | OUTPATIENT
Start: 2020-01-01 | End: 2020-01-01

## 2020-01-01 RX ORDER — MIRTAZAPINE 15 MG/1
15 TABLET, FILM COATED ORAL
Status: DISCONTINUED | OUTPATIENT
Start: 2020-01-01 | End: 2020-01-01 | Stop reason: HOSPADM

## 2020-01-01 RX ORDER — DEXTROSE MONOHYDRATE AND SODIUM CHLORIDE 5; .45 G/100ML; G/100ML
25 INJECTION, SOLUTION INTRAVENOUS CONTINUOUS
Status: DISCONTINUED | OUTPATIENT
Start: 2020-01-01 | End: 2020-01-01

## 2020-01-01 RX ORDER — INSULIN GLARGINE 100 [IU]/ML
40 INJECTION, SOLUTION SUBCUTANEOUS
Status: DISCONTINUED | OUTPATIENT
Start: 2020-01-01 | End: 2020-01-01

## 2020-01-01 RX ORDER — FAMOTIDINE 40 MG/5ML
20 POWDER, FOR SUSPENSION ORAL DAILY
Status: DISCONTINUED | OUTPATIENT
Start: 2020-01-01 | End: 2020-01-01

## 2020-01-01 RX ORDER — INSULIN GLARGINE 100 [IU]/ML
15 INJECTION, SOLUTION SUBCUTANEOUS
Status: DISCONTINUED | OUTPATIENT
Start: 2020-01-01 | End: 2020-01-01 | Stop reason: HOSPADM

## 2020-01-01 RX ORDER — VECURONIUM BROMIDE FOR INJECTION 1 MG/ML
INJECTION, POWDER, LYOPHILIZED, FOR SOLUTION INTRAVENOUS AS NEEDED
Status: DISCONTINUED | OUTPATIENT
Start: 2020-01-01 | End: 2020-01-01 | Stop reason: HOSPADM

## 2020-01-01 RX ORDER — TRAZODONE HYDROCHLORIDE 50 MG/1
50 TABLET ORAL
Status: DISCONTINUED | OUTPATIENT
Start: 2020-01-01 | End: 2020-01-01

## 2020-01-01 RX ORDER — PHENYLEPHRINE HCL IN 0.9% NACL 30MG/250ML
10-100 PLASTIC BAG, INJECTION (ML) INTRAVENOUS
Status: DISCONTINUED | OUTPATIENT
Start: 2020-01-01 | End: 2020-01-01

## 2020-01-01 RX ORDER — WARFARIN 2 MG/1
4 TABLET ORAL EVERY EVENING
Status: DISCONTINUED | OUTPATIENT
Start: 2020-01-01 | End: 2020-01-01

## 2020-01-01 RX ORDER — SERTRALINE HYDROCHLORIDE 100 MG/1
100 TABLET, FILM COATED ORAL DAILY
Status: DISCONTINUED | OUTPATIENT
Start: 2020-01-01 | End: 2020-01-01 | Stop reason: HOSPADM

## 2020-01-01 RX ORDER — OXYCODONE HYDROCHLORIDE 5 MG/1
5 TABLET ORAL
Status: DISCONTINUED | OUTPATIENT
Start: 2020-01-01 | End: 2020-01-01 | Stop reason: HOSPADM

## 2020-01-01 RX ORDER — FAMOTIDINE 20 MG/1
20 TABLET, FILM COATED ORAL DAILY
Status: DISCONTINUED | OUTPATIENT
Start: 2020-01-01 | End: 2020-01-01 | Stop reason: HOSPADM

## 2020-01-01 RX ORDER — MORPHINE SULFATE 2 MG/ML
2 INJECTION, SOLUTION INTRAMUSCULAR; INTRAVENOUS
Status: DISCONTINUED | OUTPATIENT
Start: 2020-01-01 | End: 2020-01-01 | Stop reason: HOSPADM

## 2020-01-01 RX ORDER — OXYCODONE HYDROCHLORIDE 5 MG/1
5 TABLET ORAL
Status: CANCELLED | OUTPATIENT
Start: 2020-01-01 | End: 2020-01-01

## 2020-01-01 RX ORDER — CLONAZEPAM 0.5 MG/1
0.5 TABLET ORAL
Status: DISCONTINUED | OUTPATIENT
Start: 2020-01-01 | End: 2020-01-01

## 2020-01-01 RX ORDER — AMIODARONE HYDROCHLORIDE 200 MG/1
400 TABLET ORAL EVERY 12 HOURS
Status: DISCONTINUED | OUTPATIENT
Start: 2020-01-01 | End: 2020-01-01

## 2020-01-01 RX ORDER — INSULIN GLARGINE 100 [IU]/ML
15 INJECTION, SOLUTION SUBCUTANEOUS
Status: DISCONTINUED | OUTPATIENT
Start: 2020-01-01 | End: 2020-01-01

## 2020-01-01 RX ORDER — EPHEDRINE SULFATE/0.9% NACL/PF 50 MG/5 ML
SYRINGE (ML) INTRAVENOUS AS NEEDED
Status: DISCONTINUED | OUTPATIENT
Start: 2020-01-01 | End: 2020-01-01 | Stop reason: HOSPADM

## 2020-01-01 RX ORDER — WARFARIN 1 MG/1
4 TABLET ORAL EVERY EVENING
Status: DISCONTINUED | OUTPATIENT
Start: 2020-01-01 | End: 2020-01-01

## 2020-01-01 RX ORDER — DEXTROSE 40 %
15 GEL (GRAM) ORAL AS NEEDED
Status: DISCONTINUED | OUTPATIENT
Start: 2020-01-01 | End: 2020-01-01 | Stop reason: HOSPADM

## 2020-01-01 RX ORDER — SODIUM CHLORIDE 9 MG/ML
250 INJECTION, SOLUTION INTRAVENOUS AS NEEDED
Status: DISCONTINUED | OUTPATIENT
Start: 2020-01-01 | End: 2020-01-01

## 2020-01-01 RX ORDER — OXYCODONE AND ACETAMINOPHEN 5; 325 MG/1; MG/1
1 TABLET ORAL
Status: DISCONTINUED | OUTPATIENT
Start: 2020-01-01 | End: 2020-01-01

## 2020-01-01 RX ORDER — SODIUM CHLORIDE, SODIUM LACTATE, POTASSIUM CHLORIDE, CALCIUM CHLORIDE 600; 310; 30; 20 MG/100ML; MG/100ML; MG/100ML; MG/100ML
INJECTION, SOLUTION INTRAVENOUS
Status: DISCONTINUED | OUTPATIENT
Start: 2020-01-01 | End: 2020-01-01 | Stop reason: HOSPADM

## 2020-01-01 RX ORDER — MAGNESIUM SULFATE 1 G/100ML
1 INJECTION INTRAVENOUS AS NEEDED
Status: DISCONTINUED | OUTPATIENT
Start: 2020-01-01 | End: 2020-01-01

## 2020-01-01 RX ORDER — SODIUM CHLORIDE 9 MG/ML
100 INJECTION, SOLUTION INTRAVENOUS AS NEEDED
Status: DISPENSED | OUTPATIENT
Start: 2020-01-01 | End: 2020-01-01

## 2020-01-01 RX ORDER — INSULIN GLARGINE 100 [IU]/ML
28 INJECTION, SOLUTION SUBCUTANEOUS DAILY
Status: DISCONTINUED | OUTPATIENT
Start: 2020-01-01 | End: 2020-01-01

## 2020-01-01 RX ORDER — ALBUTEROL SULFATE 0.83 MG/ML
2.5 SOLUTION RESPIRATORY (INHALATION) AS NEEDED
Status: CANCELLED | OUTPATIENT
Start: 2020-01-01

## 2020-01-01 RX ORDER — WARFARIN SODIUM 5 MG/1
5 TABLET ORAL
Status: DISCONTINUED | OUTPATIENT
Start: 2020-01-01 | End: 2020-01-01 | Stop reason: HOSPADM

## 2020-01-01 RX ORDER — LOPERAMIDE HYDROCHLORIDE 2 MG/1
2 CAPSULE ORAL
Status: SHIPPED | COMMUNITY
Start: 2020-01-01 | End: 2020-01-01

## 2020-01-01 RX ORDER — ALBUTEROL SULFATE 0.83 MG/ML
2.5 SOLUTION RESPIRATORY (INHALATION)
Status: DISCONTINUED | OUTPATIENT
Start: 2020-01-01 | End: 2020-01-01

## 2020-01-01 RX ORDER — ONDANSETRON 2 MG/ML
INJECTION INTRAMUSCULAR; INTRAVENOUS
Status: COMPLETED
Start: 2020-01-01 | End: 2020-01-01

## 2020-01-01 RX ORDER — NOREPINEPHRINE BITARTRATE/D5W 4MG/250ML
.5-16 PLASTIC BAG, INJECTION (ML) INTRAVENOUS
Status: DISCONTINUED | OUTPATIENT
Start: 2020-01-01 | End: 2020-01-01

## 2020-01-01 RX ORDER — FAMOTIDINE 10 MG/ML
INJECTION INTRAVENOUS
Status: DISCONTINUED
Start: 2020-01-01 | End: 2020-01-01

## 2020-01-01 RX ORDER — FENTANYL CITRATE 50 UG/ML
INJECTION, SOLUTION INTRAMUSCULAR; INTRAVENOUS AS NEEDED
Status: DISCONTINUED | OUTPATIENT
Start: 2020-01-01 | End: 2020-01-01 | Stop reason: HOSPADM

## 2020-01-01 RX ORDER — SODIUM BICARBONATE 84 MG/ML
50 INJECTION, SOLUTION INTRAVENOUS ONCE
Status: ACTIVE | OUTPATIENT
Start: 2020-01-01 | End: 2020-01-01

## 2020-01-01 RX ORDER — FAMOTIDINE 20 MG/1
20 TABLET, FILM COATED ORAL EVERY 12 HOURS
Status: DISCONTINUED | OUTPATIENT
Start: 2020-01-01 | End: 2020-01-01

## 2020-01-01 RX ORDER — BACITRACIN 500 [USP'U]/G
OINTMENT TOPICAL 2 TIMES DAILY
Status: DISCONTINUED | OUTPATIENT
Start: 2020-01-01 | End: 2020-01-01 | Stop reason: HOSPADM

## 2020-01-01 RX ORDER — NOREPINEPHRINE BITARTRATE/D5W 4MG/250ML
PLASTIC BAG, INJECTION (ML) INTRAVENOUS
Status: DISCONTINUED
Start: 2020-01-01 | End: 2020-01-01 | Stop reason: SDUPTHER

## 2020-01-01 RX ORDER — HYDROCODONE BITARTRATE AND ACETAMINOPHEN 7.5; 325 MG/1; MG/1
1 TABLET ORAL
Status: DISCONTINUED | OUTPATIENT
Start: 2020-01-01 | End: 2020-01-01

## 2020-01-01 RX ORDER — FUROSEMIDE 10 MG/ML
80 INJECTION INTRAMUSCULAR; INTRAVENOUS 2 TIMES DAILY
Status: DISCONTINUED | OUTPATIENT
Start: 2020-01-01 | End: 2020-01-01

## 2020-01-01 RX ORDER — ALBUTEROL SULFATE 0.83 MG/ML
SOLUTION RESPIRATORY (INHALATION)
Status: COMPLETED
Start: 2020-01-01 | End: 2020-01-01

## 2020-01-01 RX ORDER — SODIUM CHLORIDE 9 MG/ML
3 INJECTION, SOLUTION INTRAVENOUS ONCE
Status: DISCONTINUED | OUTPATIENT
Start: 2020-01-01 | End: 2020-01-01

## 2020-01-01 RX ORDER — FENTANYL CITRATE-0.9 % NACL/PF 25 MCG/ML
0-200 PLASTIC BAG, INJECTION (ML) INJECTION
Status: DISCONTINUED | OUTPATIENT
Start: 2020-01-01 | End: 2020-01-01 | Stop reason: SDUPTHER

## 2020-01-01 RX ORDER — SODIUM CHLORIDE 0.9 % (FLUSH) 0.9 %
5-40 SYRINGE (ML) INJECTION AS NEEDED
Status: DISCONTINUED | OUTPATIENT
Start: 2020-01-01 | End: 2020-01-01

## 2020-01-01 RX ORDER — MORPHINE SULFATE 10 MG/ML
5 INJECTION, SOLUTION INTRAMUSCULAR; INTRAVENOUS
Status: DISCONTINUED | OUTPATIENT
Start: 2020-01-01 | End: 2020-01-01

## 2020-01-01 RX ORDER — ALBUTEROL SULFATE 0.83 MG/ML
2.5 SOLUTION RESPIRATORY (INHALATION)
Status: DISCONTINUED | OUTPATIENT
Start: 2020-01-01 | End: 2020-01-01 | Stop reason: SDUPTHER

## 2020-01-01 RX ORDER — INSULIN GLARGINE 100 [IU]/ML
30 INJECTION, SOLUTION SUBCUTANEOUS
Status: DISCONTINUED | OUTPATIENT
Start: 2020-01-01 | End: 2020-01-01

## 2020-01-01 RX ORDER — MIDODRINE HYDROCHLORIDE 10 MG/1
10 TABLET ORAL
Qty: 90 TAB | Refills: 0 | Status: SHIPPED | OUTPATIENT
Start: 2020-01-01 | End: 2020-01-01

## 2020-01-01 RX ORDER — IPRATROPIUM BROMIDE AND ALBUTEROL SULFATE 2.5; .5 MG/3ML; MG/3ML
3 SOLUTION RESPIRATORY (INHALATION)
Status: DISCONTINUED | OUTPATIENT
Start: 2020-01-01 | End: 2020-01-01

## 2020-01-01 RX ORDER — FUROSEMIDE 20 MG/1
20 TABLET ORAL DAILY
Status: DISCONTINUED | OUTPATIENT
Start: 2020-01-01 | End: 2020-01-01

## 2020-01-01 RX ORDER — SODIUM CHLORIDE FOR INHALATION 3 %
4 VIAL, NEBULIZER (ML) INHALATION
Status: DISCONTINUED | OUTPATIENT
Start: 2020-01-01 | End: 2020-01-01 | Stop reason: HOSPADM

## 2020-01-01 RX ORDER — SODIUM CHLORIDE 0.9 % (FLUSH) 0.9 %
5-40 SYRINGE (ML) INJECTION AS NEEDED
Status: DISCONTINUED | OUTPATIENT
Start: 2020-01-01 | End: 2020-01-01 | Stop reason: HOSPADM

## 2020-01-01 RX ORDER — ARGATROBAN 1 MG/ML
.5-1 INJECTION INTRAVENOUS
Status: DISCONTINUED | OUTPATIENT
Start: 2020-01-01 | End: 2020-01-01

## 2020-01-01 RX ORDER — ALBUMIN HUMAN 250 G/1000ML
12.5 SOLUTION INTRAVENOUS EVERY 6 HOURS
Status: DISCONTINUED | OUTPATIENT
Start: 2020-01-01 | End: 2020-01-01

## 2020-01-01 RX ORDER — ALBUTEROL SULFATE 0.83 MG/ML
2.5 SOLUTION RESPIRATORY (INHALATION)
Status: DISCONTINUED | OUTPATIENT
Start: 2020-01-01 | End: 2020-01-01 | Stop reason: HOSPADM

## 2020-01-01 RX ORDER — SODIUM CHLORIDE 9 MG/ML
1 INJECTION, SOLUTION INTRAVENOUS AS NEEDED
Status: DISCONTINUED | OUTPATIENT
Start: 2020-01-01 | End: 2020-01-01

## 2020-01-01 RX ORDER — IBUPROFEN 200 MG
CAPSULE ORAL
COMMUNITY
End: 2020-01-01

## 2020-01-01 RX ORDER — POLYETHYLENE GLYCOL 3350 17 G/17G
17 POWDER, FOR SOLUTION ORAL DAILY
Status: DISCONTINUED | OUTPATIENT
Start: 2020-01-01 | End: 2020-01-01 | Stop reason: HOSPADM

## 2020-01-01 RX ORDER — POLYETHYLENE GLYCOL 3350 17 G/17G
238 POWDER, FOR SOLUTION ORAL ONCE
Status: COMPLETED | OUTPATIENT
Start: 2020-01-01 | End: 2020-01-01

## 2020-01-01 RX ORDER — HYDROCODONE BITARTRATE AND ACETAMINOPHEN 5; 325 MG/1; MG/1
1 TABLET ORAL
Status: DISCONTINUED | OUTPATIENT
Start: 2020-01-01 | End: 2020-01-01 | Stop reason: HOSPADM

## 2020-01-01 RX ORDER — INSULIN GLARGINE 100 [IU]/ML
23 INJECTION, SOLUTION SUBCUTANEOUS DAILY
Status: DISCONTINUED | OUTPATIENT
Start: 2020-01-01 | End: 2020-01-01

## 2020-01-01 RX ORDER — TAMSULOSIN HYDROCHLORIDE 0.4 MG/1
0.4 CAPSULE ORAL
Status: DISCONTINUED | OUTPATIENT
Start: 2020-01-01 | End: 2020-01-01 | Stop reason: HOSPADM

## 2020-01-01 RX ORDER — MIDAZOLAM HYDROCHLORIDE 1 MG/ML
.5-2 INJECTION, SOLUTION INTRAMUSCULAR; INTRAVENOUS
Status: DISCONTINUED | OUTPATIENT
Start: 2020-01-01 | End: 2020-01-01 | Stop reason: ALTCHOICE

## 2020-01-01 RX ORDER — WARFARIN 2 MG/1
4 TABLET ORAL ONCE
Status: DISCONTINUED | OUTPATIENT
Start: 2020-01-01 | End: 2020-01-01

## 2020-01-01 RX ORDER — MIDAZOLAM HYDROCHLORIDE 1 MG/ML
2 INJECTION, SOLUTION INTRAMUSCULAR; INTRAVENOUS
Status: COMPLETED | OUTPATIENT
Start: 2020-01-01 | End: 2020-01-01

## 2020-01-01 RX ORDER — FUROSEMIDE 10 MG/ML
40 INJECTION INTRAMUSCULAR; INTRAVENOUS 2 TIMES DAILY
Status: DISCONTINUED | OUTPATIENT
Start: 2020-01-01 | End: 2020-01-01 | Stop reason: HOSPADM

## 2020-01-01 RX ORDER — LIDOCAINE HYDROCHLORIDE 10 MG/ML
0.3 INJECTION INFILTRATION; PERINEURAL ONCE
Status: DISCONTINUED | OUTPATIENT
Start: 2020-01-01 | End: 2020-01-01 | Stop reason: HOSPADM

## 2020-01-01 RX ORDER — AMIODARONE HYDROCHLORIDE 200 MG/1
600 TABLET ORAL ONCE
Status: COMPLETED | OUTPATIENT
Start: 2020-01-01 | End: 2020-01-01

## 2020-01-01 RX ORDER — BISACODYL 5 MG
5 TABLET, DELAYED RELEASE (ENTERIC COATED) ORAL DAILY PRN
Status: DISCONTINUED | OUTPATIENT
Start: 2020-01-01 | End: 2020-01-01 | Stop reason: HOSPADM

## 2020-01-01 RX ORDER — PROPOFOL 10 MG/ML
0-50 VIAL (ML) INTRAVENOUS
Status: DISCONTINUED | OUTPATIENT
Start: 2020-01-01 | End: 2020-01-01

## 2020-01-01 RX ORDER — ATORVASTATIN CALCIUM 40 MG/1
80 TABLET, FILM COATED ORAL
Status: DISCONTINUED | OUTPATIENT
Start: 2020-01-01 | End: 2020-01-01

## 2020-01-01 RX ORDER — MIDAZOLAM HYDROCHLORIDE 1 MG/ML
1 INJECTION, SOLUTION INTRAMUSCULAR; INTRAVENOUS
Status: DISCONTINUED | OUTPATIENT
Start: 2020-01-01 | End: 2020-01-01

## 2020-01-01 RX ORDER — DEXTROSE 50 % IN WATER (D50W) INTRAVENOUS SYRINGE
25 AS NEEDED
Status: DISCONTINUED | OUTPATIENT
Start: 2020-01-01 | End: 2020-01-01

## 2020-01-01 RX ORDER — VANCOMYCIN HYDROCHLORIDE
1250 EVERY 24 HOURS
Status: DISCONTINUED | OUTPATIENT
Start: 2020-01-01 | End: 2020-01-01

## 2020-01-01 RX ORDER — FUROSEMIDE 10 MG/ML
INJECTION INTRAMUSCULAR; INTRAVENOUS
Status: COMPLETED
Start: 2020-01-01 | End: 2020-01-01

## 2020-01-01 RX ORDER — FENTANYL CITRATE 50 UG/ML
25-50 INJECTION, SOLUTION INTRAMUSCULAR; INTRAVENOUS
Status: DISCONTINUED | OUTPATIENT
Start: 2020-01-01 | End: 2020-01-01 | Stop reason: ALTCHOICE

## 2020-01-01 RX ORDER — SODIUM CHLORIDE FOR INHALATION 3 %
4 VIAL, NEBULIZER (ML) INHALATION
Status: DISCONTINUED | OUTPATIENT
Start: 2020-01-01 | End: 2020-01-01

## 2020-01-01 RX ORDER — OXYCODONE AND ACETAMINOPHEN 7.5; 325 MG/1; MG/1
1 TABLET ORAL
Qty: 30 TAB | Refills: 0 | Status: SHIPPED | OUTPATIENT
Start: 2020-01-01 | End: 2020-01-01

## 2020-01-01 RX ORDER — HEPARIN SODIUM 1000 [USP'U]/ML
INJECTION, SOLUTION INTRAVENOUS; SUBCUTANEOUS AS NEEDED
Status: DISCONTINUED | OUTPATIENT
Start: 2020-01-01 | End: 2020-01-01 | Stop reason: HOSPADM

## 2020-01-01 RX ORDER — BUSPIRONE HYDROCHLORIDE 5 MG/1
5 TABLET ORAL 3 TIMES DAILY
Status: COMPLETED | OUTPATIENT
Start: 2020-01-01 | End: 2020-01-01

## 2020-01-01 RX ORDER — FUROSEMIDE 40 MG/1
40 TABLET ORAL DAILY
Status: DISCONTINUED | OUTPATIENT
Start: 2020-01-01 | End: 2020-01-01 | Stop reason: HOSPADM

## 2020-01-01 RX ORDER — ALBUMIN HUMAN 50 G/1000ML
25 SOLUTION INTRAVENOUS ONCE
Status: COMPLETED | OUTPATIENT
Start: 2020-01-01 | End: 2020-01-01

## 2020-01-01 RX ORDER — ACETAMINOPHEN 10 MG/ML
1000 INJECTION, SOLUTION INTRAVENOUS
Status: COMPLETED | OUTPATIENT
Start: 2020-01-01 | End: 2020-01-01

## 2020-01-01 RX ORDER — SODIUM BICARBONATE 1 MEQ/ML
50 SYRINGE (ML) INTRAVENOUS ONCE
Status: DISPENSED | OUTPATIENT
Start: 2020-01-01 | End: 2020-01-01

## 2020-01-01 RX ORDER — AMIODARONE HYDROCHLORIDE 200 MG/1
600 TABLET ORAL
COMMUNITY
Start: 2020-01-01 | End: 2020-01-01

## 2020-01-01 RX ORDER — MIDODRINE HYDROCHLORIDE 5 MG/1
10 TABLET ORAL DAILY
Status: DISCONTINUED | OUTPATIENT
Start: 2020-01-01 | End: 2020-01-01

## 2020-01-01 RX ORDER — WARFARIN 2.5 MG/1
5 TABLET ORAL
Status: DISCONTINUED | OUTPATIENT
Start: 2020-01-01 | End: 2020-01-01 | Stop reason: DRUGHIGH

## 2020-01-01 RX ORDER — ROCURONIUM BROMIDE 10 MG/ML
INJECTION, SOLUTION INTRAVENOUS AS NEEDED
Status: DISCONTINUED | OUTPATIENT
Start: 2020-01-01 | End: 2020-01-01 | Stop reason: HOSPADM

## 2020-01-01 RX ORDER — FENTANYL CITRATE 50 UG/ML
25-100 INJECTION, SOLUTION INTRAMUSCULAR; INTRAVENOUS
Status: DISCONTINUED | OUTPATIENT
Start: 2020-01-01 | End: 2020-01-01

## 2020-01-01 RX ORDER — ETOMIDATE 2 MG/ML
INJECTION INTRAVENOUS AS NEEDED
Status: DISCONTINUED | OUTPATIENT
Start: 2020-01-01 | End: 2020-01-01 | Stop reason: HOSPADM

## 2020-01-01 RX ORDER — ALBUMIN HUMAN 50 G/1000ML
25 SOLUTION INTRAVENOUS ONCE
Status: DISCONTINUED | OUTPATIENT
Start: 2020-01-01 | End: 2020-01-01

## 2020-01-01 RX ORDER — LIDOCAINE HYDROCHLORIDE 20 MG/ML
2-20 INJECTION, SOLUTION INFILTRATION; PERINEURAL
Status: DISCONTINUED | OUTPATIENT
Start: 2020-01-01 | End: 2020-01-01

## 2020-01-01 RX ORDER — NALOXONE HYDROCHLORIDE 0.4 MG/ML
0.4 INJECTION, SOLUTION INTRAMUSCULAR; INTRAVENOUS; SUBCUTANEOUS AS NEEDED
Status: DISCONTINUED | OUTPATIENT
Start: 2020-01-01 | End: 2020-01-01 | Stop reason: HOSPADM

## 2020-01-01 RX ORDER — INSULIN GLARGINE 100 [IU]/ML
18 INJECTION, SOLUTION SUBCUTANEOUS
Status: DISCONTINUED | OUTPATIENT
Start: 2020-01-01 | End: 2020-01-01

## 2020-01-01 RX ORDER — HYDROCODONE BITARTRATE AND ACETAMINOPHEN 7.5; 325 MG/1; MG/1
1 TABLET ORAL AS NEEDED
Status: DISCONTINUED | OUTPATIENT
Start: 2020-01-01 | End: 2020-01-01 | Stop reason: HOSPADM

## 2020-01-01 RX ORDER — MIDAZOLAM HYDROCHLORIDE 1 MG/ML
2 INJECTION, SOLUTION INTRAMUSCULAR; INTRAVENOUS ONCE
Status: DISCONTINUED | OUTPATIENT
Start: 2020-01-01 | End: 2020-01-01 | Stop reason: HOSPADM

## 2020-01-01 RX ORDER — INSULIN LISPRO 100 [IU]/ML
INJECTION, SOLUTION INTRAVENOUS; SUBCUTANEOUS
Status: DISCONTINUED | OUTPATIENT
Start: 2020-01-01 | End: 2020-01-01

## 2020-01-01 RX ORDER — DEXTROSE 50 % IN WATER (D50W) INTRAVENOUS SYRINGE
25-50 AS NEEDED
Status: DISCONTINUED | OUTPATIENT
Start: 2020-01-01 | End: 2020-01-01 | Stop reason: HOSPADM

## 2020-01-01 RX ORDER — MIDODRINE HYDROCHLORIDE 5 MG/1
10 TABLET ORAL
Status: DISCONTINUED | OUTPATIENT
Start: 2020-01-01 | End: 2020-01-01 | Stop reason: HOSPADM

## 2020-01-01 RX ORDER — WARFARIN 2.5 MG/1
2.5 TABLET ORAL EVERY EVENING
Qty: 30 TAB | Refills: 1 | Status: SHIPPED
Start: 2020-01-01 | End: 2020-01-01

## 2020-01-01 RX ORDER — ACETAMINOPHEN 10 MG/ML
1000 INJECTION, SOLUTION INTRAVENOUS ONCE
Status: COMPLETED | OUTPATIENT
Start: 2020-01-01 | End: 2020-01-01

## 2020-01-01 RX ORDER — FAMOTIDINE 20 MG/1
20 TABLET, FILM COATED ORAL 2 TIMES DAILY
Status: DISCONTINUED | OUTPATIENT
Start: 2020-01-01 | End: 2020-01-01

## 2020-01-01 RX ORDER — INSULIN GLARGINE 100 [IU]/ML
5 INJECTION, SOLUTION SUBCUTANEOUS
Status: DISCONTINUED | OUTPATIENT
Start: 2020-01-01 | End: 2020-01-01 | Stop reason: HOSPADM

## 2020-01-01 RX ORDER — SODIUM CHLORIDE 0.9 % (FLUSH) 0.9 %
5-40 SYRINGE (ML) INJECTION EVERY 8 HOURS
Status: DISCONTINUED | OUTPATIENT
Start: 2020-01-01 | End: 2020-01-01 | Stop reason: SDUPTHER

## 2020-01-01 RX ORDER — WARFARIN SODIUM 5 MG/1
5 TABLET ORAL EVERY EVENING
Status: DISCONTINUED | OUTPATIENT
Start: 2020-01-01 | End: 2020-01-01 | Stop reason: DRUGHIGH

## 2020-01-01 RX ORDER — ALPRAZOLAM 0.5 MG/1
0.5 TABLET ORAL
Status: DISCONTINUED | OUTPATIENT
Start: 2020-01-01 | End: 2020-01-01

## 2020-01-01 RX ORDER — BENZONATATE 100 MG/1
100 CAPSULE ORAL
Qty: 21 CAP | Refills: 0 | Status: SHIPPED | OUTPATIENT
Start: 2020-01-01 | End: 2020-01-01

## 2020-01-01 RX ORDER — GUAIFENESIN 600 MG/1
1200 TABLET, EXTENDED RELEASE ORAL EVERY 12 HOURS
Status: DISCONTINUED | OUTPATIENT
Start: 2020-01-01 | End: 2020-01-01 | Stop reason: HOSPADM

## 2020-01-01 RX ORDER — MIDODRINE HYDROCHLORIDE 5 MG/1
5 TABLET ORAL
Status: DISCONTINUED | OUTPATIENT
Start: 2020-01-01 | End: 2020-01-01

## 2020-01-01 RX ORDER — FUROSEMIDE 40 MG/1
TABLET ORAL
Qty: 14 TAB | Refills: 0 | Status: SHIPPED | OUTPATIENT
Start: 2020-01-01 | End: 2020-01-01 | Stop reason: SDUPTHER

## 2020-01-01 RX ORDER — FENTANYL CITRATE 50 UG/ML
100 INJECTION, SOLUTION INTRAMUSCULAR; INTRAVENOUS ONCE
Status: ACTIVE | OUTPATIENT
Start: 2020-01-01 | End: 2020-01-01

## 2020-01-01 RX ORDER — INSULIN LISPRO 100 [IU]/ML
INJECTION, SOLUTION INTRAVENOUS; SUBCUTANEOUS
Status: CANCELLED | OUTPATIENT
Start: 2020-01-01

## 2020-01-01 RX ORDER — FENTANYL CITRATE 50 UG/ML
50 INJECTION, SOLUTION INTRAMUSCULAR; INTRAVENOUS ONCE
Status: ACTIVE | OUTPATIENT
Start: 2020-01-01 | End: 2020-01-01

## 2020-01-01 RX ORDER — CLINDAMYCIN PHOSPHATE 900 MG/50ML
900 INJECTION INTRAVENOUS ONCE
Status: COMPLETED | OUTPATIENT
Start: 2020-01-01 | End: 2020-01-01

## 2020-01-01 RX ORDER — NALOXONE HYDROCHLORIDE 0.4 MG/ML
0.1 INJECTION, SOLUTION INTRAMUSCULAR; INTRAVENOUS; SUBCUTANEOUS AS NEEDED
Status: DISCONTINUED | OUTPATIENT
Start: 2020-01-01 | End: 2020-01-01 | Stop reason: HOSPADM

## 2020-01-01 RX ORDER — WARFARIN SODIUM 5 MG/1
5 TABLET ORAL EVERY EVENING
Status: DISCONTINUED | OUTPATIENT
Start: 2020-01-01 | End: 2020-01-01 | Stop reason: HOSPADM

## 2020-01-01 RX ORDER — ACETAMINOPHEN 325 MG/1
650 TABLET ORAL
Status: DISCONTINUED | OUTPATIENT
Start: 2020-01-01 | End: 2020-01-01 | Stop reason: HOSPADM

## 2020-01-01 RX ORDER — OXYCODONE AND ACETAMINOPHEN 5; 325 MG/1; MG/1
1 TABLET ORAL
Status: DISCONTINUED | OUTPATIENT
Start: 2020-01-01 | End: 2020-01-01 | Stop reason: HOSPADM

## 2020-01-01 RX ORDER — ALBUTEROL SULFATE 0.83 MG/ML
SOLUTION RESPIRATORY (INHALATION)
Status: ACTIVE
Start: 2020-01-01 | End: 2020-01-01

## 2020-01-01 RX ORDER — FENTANYL CITRATE 50 UG/ML
25-50 INJECTION, SOLUTION INTRAMUSCULAR; INTRAVENOUS
Status: DISCONTINUED | OUTPATIENT
Start: 2020-01-01 | End: 2020-01-01 | Stop reason: HOSPADM

## 2020-01-01 RX ORDER — VASOPRESSIN IN 0.9 % NACL 100/100 ML
100 PLASTIC BAG, INJECTION (ML) INTRAVENOUS
Status: DISCONTINUED | OUTPATIENT
Start: 2020-01-01 | End: 2020-01-01

## 2020-01-01 RX ORDER — SERTRALINE HYDROCHLORIDE 100 MG/1
100 TABLET, FILM COATED ORAL DAILY
Qty: 30 TAB | Refills: 0 | Status: SHIPPED | OUTPATIENT
Start: 2020-01-01 | End: 2020-01-01

## 2020-01-01 RX ORDER — METOPROLOL TARTRATE 25 MG/1
12.5 TABLET, FILM COATED ORAL
COMMUNITY
Start: 2020-01-01 | End: 2020-01-01

## 2020-01-01 RX ORDER — MIDAZOLAM HYDROCHLORIDE 1 MG/ML
2 INJECTION, SOLUTION INTRAMUSCULAR; INTRAVENOUS
Status: DISCONTINUED | OUTPATIENT
Start: 2020-01-01 | End: 2020-01-01 | Stop reason: HOSPADM

## 2020-01-01 RX ORDER — LEVOFLOXACIN 5 MG/ML
750 INJECTION, SOLUTION INTRAVENOUS
Status: DISCONTINUED | OUTPATIENT
Start: 2020-01-01 | End: 2020-01-01

## 2020-01-01 RX ORDER — OLANZAPINE 5 MG/1
5 TABLET ORAL
Status: DISCONTINUED | OUTPATIENT
Start: 2020-01-01 | End: 2020-01-01 | Stop reason: HOSPADM

## 2020-01-01 RX ORDER — DIPHENHYDRAMINE HCL 25 MG
25 CAPSULE ORAL
Qty: 10 CAP | Refills: 0 | Status: SHIPPED | OUTPATIENT
Start: 2020-01-01 | End: 2020-01-01

## 2020-01-01 RX ORDER — SODIUM CHLORIDE, SODIUM LACTATE, POTASSIUM CHLORIDE, CALCIUM CHLORIDE 600; 310; 30; 20 MG/100ML; MG/100ML; MG/100ML; MG/100ML
100 INJECTION, SOLUTION INTRAVENOUS CONTINUOUS
Status: DISCONTINUED | OUTPATIENT
Start: 2020-01-01 | End: 2020-01-01 | Stop reason: HOSPADM

## 2020-01-01 RX ORDER — LORATADINE 10 MG/1
10 TABLET ORAL DAILY
Qty: 4 TAB | Refills: 0 | Status: SHIPPED | OUTPATIENT
Start: 2020-01-01 | End: 2020-01-01

## 2020-01-01 RX ORDER — TAMSULOSIN HYDROCHLORIDE 0.4 MG/1
0.4 CAPSULE ORAL
Qty: 30 CAP | Refills: 0 | Status: SHIPPED | OUTPATIENT
Start: 2020-01-01 | End: 2020-01-01

## 2020-01-01 RX ORDER — SODIUM POLYSTYRENE SULFONATE 15 G/60ML
45 SUSPENSION ORAL; RECTAL
Status: COMPLETED | OUTPATIENT
Start: 2020-01-01 | End: 2020-01-01

## 2020-01-01 RX ORDER — NALOXONE HYDROCHLORIDE 0.4 MG/ML
0.4 INJECTION, SOLUTION INTRAMUSCULAR; INTRAVENOUS; SUBCUTANEOUS
Status: DISCONTINUED | OUTPATIENT
Start: 2020-01-01 | End: 2020-01-01 | Stop reason: HOSPADM

## 2020-01-01 RX ORDER — PROPOFOL 10 MG/ML
INJECTION, EMULSION INTRAVENOUS AS NEEDED
Status: DISCONTINUED | OUTPATIENT
Start: 2020-01-01 | End: 2020-01-01 | Stop reason: HOSPADM

## 2020-01-01 RX ORDER — GUAIFENESIN 100 MG/5ML
81 LIQUID (ML) ORAL DAILY
Status: DISCONTINUED | OUTPATIENT
Start: 2020-01-01 | End: 2020-01-01

## 2020-01-01 RX ORDER — LEVOFLOXACIN 500 MG/1
500 TABLET, FILM COATED ORAL
Qty: 4 TAB | Refills: 0 | Status: SHIPPED | OUTPATIENT
Start: 2020-01-01 | End: 2020-01-01

## 2020-01-01 RX ORDER — INSULIN LISPRO 100 [IU]/ML
5 INJECTION, SOLUTION INTRAVENOUS; SUBCUTANEOUS
Status: DISCONTINUED | OUTPATIENT
Start: 2020-01-01 | End: 2020-01-01 | Stop reason: HOSPADM

## 2020-01-01 RX ORDER — SODIUM POLYSTYRENE SULFONATE 15 G/60ML
15 SUSPENSION ORAL; RECTAL EVERY 6 HOURS
Status: COMPLETED | OUTPATIENT
Start: 2020-01-01 | End: 2020-01-01

## 2020-01-01 RX ORDER — CLINDAMYCIN PHOSPHATE 600 MG/50ML
600 INJECTION INTRAVENOUS EVERY 8 HOURS
Status: DISCONTINUED | OUTPATIENT
Start: 2020-01-01 | End: 2020-01-01

## 2020-01-01 RX ORDER — ALBUMIN HUMAN 50 G/1000ML
SOLUTION INTRAVENOUS
Status: COMPLETED
Start: 2020-01-01 | End: 2020-01-01

## 2020-01-01 RX ORDER — CLINDAMYCIN PHOSPHATE 900 MG/50ML
900 INJECTION INTRAVENOUS EVERY 8 HOURS
Status: COMPLETED | OUTPATIENT
Start: 2020-01-01 | End: 2020-01-01

## 2020-01-01 RX ORDER — VANCOMYCIN 2 GRAM/500 ML IN 0.9 % SODIUM CHLORIDE INTRAVENOUS
2 ONCE
Status: DISCONTINUED | OUTPATIENT
Start: 2020-01-01 | End: 2020-01-01 | Stop reason: SDUPTHER

## 2020-01-01 RX ORDER — INSULIN LISPRO 100 [IU]/ML
INJECTION, SOLUTION INTRAVENOUS; SUBCUTANEOUS EVERY 6 HOURS
Status: DISCONTINUED | OUTPATIENT
Start: 2020-01-01 | End: 2020-01-01

## 2020-01-01 RX ORDER — INSULIN GLARGINE 100 [IU]/ML
25 INJECTION, SOLUTION SUBCUTANEOUS DAILY
Status: DISCONTINUED | OUTPATIENT
Start: 2020-01-01 | End: 2020-01-01

## 2020-01-01 RX ORDER — INSULIN LISPRO 100 [IU]/ML
6 INJECTION, SOLUTION INTRAVENOUS; SUBCUTANEOUS
Status: DISCONTINUED | OUTPATIENT
Start: 2020-01-01 | End: 2020-01-01

## 2020-01-01 RX ORDER — MIRTAZAPINE 30 MG/1
15 TABLET, FILM COATED ORAL
Status: DISCONTINUED | OUTPATIENT
Start: 2020-01-01 | End: 2020-01-01 | Stop reason: HOSPADM

## 2020-01-01 RX ORDER — ALBUMIN HUMAN 50 G/1000ML
SOLUTION INTRAVENOUS AS NEEDED
Status: DISCONTINUED | OUTPATIENT
Start: 2020-01-01 | End: 2020-01-01 | Stop reason: HOSPADM

## 2020-01-01 RX ORDER — SODIUM CHLORIDE, SODIUM LACTATE, POTASSIUM CHLORIDE, CALCIUM CHLORIDE 600; 310; 30; 20 MG/100ML; MG/100ML; MG/100ML; MG/100ML
100 INJECTION, SOLUTION INTRAVENOUS CONTINUOUS
Status: DISCONTINUED | OUTPATIENT
Start: 2020-01-01 | End: 2020-01-01

## 2020-01-01 RX ORDER — SODIUM CHLORIDE, SODIUM LACTATE, POTASSIUM CHLORIDE, CALCIUM CHLORIDE 600; 310; 30; 20 MG/100ML; MG/100ML; MG/100ML; MG/100ML
75 INJECTION, SOLUTION INTRAVENOUS CONTINUOUS
Status: DISCONTINUED | OUTPATIENT
Start: 2020-01-01 | End: 2020-01-01 | Stop reason: HOSPADM

## 2020-01-01 RX ORDER — NOREPINEPHRINE BITARTRATE/D5W 4MG/250ML
.05-.2 PLASTIC BAG, INJECTION (ML) INTRAVENOUS
Status: DISCONTINUED | OUTPATIENT
Start: 2020-01-01 | End: 2020-01-01 | Stop reason: SDUPTHER

## 2020-01-01 RX ORDER — LORATADINE 10 MG/1
10 TABLET ORAL DAILY
Status: DISCONTINUED | OUTPATIENT
Start: 2020-01-01 | End: 2020-01-01 | Stop reason: HOSPADM

## 2020-01-01 RX ORDER — SODIUM CHLORIDE 0.9 % (FLUSH) 0.9 %
5-40 SYRINGE (ML) INJECTION EVERY 8 HOURS
Status: DISCONTINUED | OUTPATIENT
Start: 2020-01-01 | End: 2020-01-01

## 2020-01-01 RX ORDER — INSULIN GLARGINE 100 [IU]/ML
10 INJECTION, SOLUTION SUBCUTANEOUS
Status: DISCONTINUED | OUTPATIENT
Start: 2020-01-01 | End: 2020-01-01

## 2020-01-01 RX ORDER — SODIUM CHLORIDE 0.9 % (FLUSH) 0.9 %
10 SYRINGE (ML) INJECTION
Status: COMPLETED | OUTPATIENT
Start: 2020-01-01 | End: 2020-01-01

## 2020-01-01 RX ORDER — HYDROCODONE BITARTRATE AND HOMATROPINE METHYLBROMIDE 1.5; 5 MG/5ML; MG/5ML
5 SYRUP ORAL
Status: DISCONTINUED | OUTPATIENT
Start: 2020-01-01 | End: 2020-01-01 | Stop reason: HOSPADM

## 2020-01-01 RX ORDER — SERTRALINE HYDROCHLORIDE 50 MG/1
50 TABLET, FILM COATED ORAL DAILY
Status: DISCONTINUED | OUTPATIENT
Start: 2020-01-01 | End: 2020-01-01

## 2020-01-01 RX ORDER — LORAZEPAM 1 MG/1
1 TABLET ORAL
Qty: 12 TAB | Refills: 0 | Status: SHIPPED | OUTPATIENT
Start: 2020-01-01

## 2020-01-01 RX ORDER — ACETAMINOPHEN 325 MG/1
650 TABLET ORAL
Qty: 12 TAB | Refills: 0 | Status: SHIPPED | OUTPATIENT
Start: 2020-01-01 | End: 2020-01-01

## 2020-01-01 RX ORDER — SODIUM BICARBONATE 84 MG/ML
INJECTION, SOLUTION INTRAVENOUS
Status: COMPLETED
Start: 2020-01-01 | End: 2020-01-01

## 2020-01-01 RX ORDER — TRAMADOL HYDROCHLORIDE 50 MG/1
50 TABLET ORAL
Status: DISCONTINUED | OUTPATIENT
Start: 2020-01-01 | End: 2020-01-01 | Stop reason: HOSPADM

## 2020-01-01 RX ORDER — DOPAMINE HYDROCHLORIDE 320 MG/100ML
2-20 INJECTION, SOLUTION INTRAVENOUS
Status: DISCONTINUED | OUTPATIENT
Start: 2020-01-01 | End: 2020-01-01

## 2020-01-01 RX ORDER — LEVOFLOXACIN 5 MG/ML
500 INJECTION, SOLUTION INTRAVENOUS
Status: DISCONTINUED | OUTPATIENT
Start: 2020-01-01 | End: 2020-01-01 | Stop reason: DRUGHIGH

## 2020-01-01 RX ORDER — MUPIROCIN 20 MG/G
OINTMENT TOPICAL 2 TIMES DAILY
Status: COMPLETED | OUTPATIENT
Start: 2020-01-01 | End: 2020-01-01

## 2020-01-01 RX ORDER — MIRTAZAPINE 15 MG/1
15 TABLET, FILM COATED ORAL
Qty: 30 TAB | Refills: 3 | Status: SHIPPED
Start: 2020-01-01 | End: 2020-01-01

## 2020-01-01 RX ORDER — ALBUMIN HUMAN 50 G/1000ML
SOLUTION INTRAVENOUS
Status: DISCONTINUED
Start: 2020-01-01 | End: 2020-01-01

## 2020-01-01 RX ORDER — HYDROXYZINE PAMOATE 25 MG/1
25 CAPSULE ORAL ONCE
Status: COMPLETED | OUTPATIENT
Start: 2020-01-01 | End: 2020-01-01

## 2020-01-01 RX ORDER — ADHESIVE BANDAGE
30 BANDAGE TOPICAL DAILY PRN
Status: DISCONTINUED | OUTPATIENT
Start: 2020-01-01 | End: 2020-01-01 | Stop reason: HOSPADM

## 2020-01-01 RX ORDER — SCOLOPAMINE TRANSDERMAL SYSTEM 1 MG/1
1 PATCH, EXTENDED RELEASE TRANSDERMAL
Qty: 1 PATCH | Refills: 0 | Status: SHIPPED | OUTPATIENT
Start: 2020-01-01 | End: 2020-01-01

## 2020-01-01 RX ORDER — INSULIN GLARGINE 100 [IU]/ML
20 INJECTION, SOLUTION SUBCUTANEOUS DAILY
Status: DISCONTINUED | OUTPATIENT
Start: 2020-01-01 | End: 2020-01-01

## 2020-01-01 RX ORDER — INSULIN GLARGINE 100 [IU]/ML
20 INJECTION, SOLUTION SUBCUTANEOUS
Status: DISCONTINUED | OUTPATIENT
Start: 2020-01-01 | End: 2020-01-01

## 2020-01-01 RX ORDER — INSULIN GLARGINE 100 [IU]/ML
25 INJECTION, SOLUTION SUBCUTANEOUS
Status: DISCONTINUED | OUTPATIENT
Start: 2020-01-01 | End: 2020-01-01

## 2020-01-01 RX ORDER — INSULIN GLARGINE 100 [IU]/ML
30 INJECTION, SOLUTION SUBCUTANEOUS DAILY
Status: DISCONTINUED | OUTPATIENT
Start: 2020-01-01 | End: 2020-01-01

## 2020-01-01 RX ORDER — CLINDAMYCIN PHOSPHATE 900 MG/50ML
900 INJECTION INTRAVENOUS ONCE
Status: DISCONTINUED | OUTPATIENT
Start: 2020-01-01 | End: 2020-01-01 | Stop reason: HOSPADM

## 2020-01-01 RX ORDER — POTASSIUM CHLORIDE 14.9 MG/ML
10 INJECTION INTRAVENOUS AS NEEDED
Status: ACTIVE | OUTPATIENT
Start: 2020-01-01 | End: 2020-01-01

## 2020-01-01 RX ORDER — VANCOMYCIN HYDROCHLORIDE 1 G/20ML
INJECTION, POWDER, LYOPHILIZED, FOR SOLUTION INTRAVENOUS ONCE
Status: DISCONTINUED | OUTPATIENT
Start: 2020-01-01 | End: 2020-01-01 | Stop reason: DRUGHIGH

## 2020-01-01 RX ORDER — ACETAMINOPHEN 500 MG
1000 TABLET ORAL
Status: COMPLETED | OUTPATIENT
Start: 2020-01-01 | End: 2020-01-01

## 2020-01-01 RX ORDER — FAMOTIDINE 20 MG/1
20 TABLET, FILM COATED ORAL ONCE
Status: ACTIVE | OUTPATIENT
Start: 2020-01-01 | End: 2020-01-01

## 2020-01-01 RX ORDER — TAMSULOSIN HYDROCHLORIDE 0.4 MG/1
0.4 CAPSULE ORAL DAILY
Status: DISCONTINUED | OUTPATIENT
Start: 2020-01-01 | End: 2020-01-01

## 2020-01-01 RX ORDER — ESCITALOPRAM OXALATE 10 MG/1
10 TABLET ORAL DAILY
Status: DISCONTINUED | OUTPATIENT
Start: 2020-01-01 | End: 2020-01-01

## 2020-01-01 RX ORDER — CEFAZOLIN SODIUM/WATER 2 G/20 ML
2 SYRINGE (ML) INTRAVENOUS EVERY 8 HOURS
Status: COMPLETED | OUTPATIENT
Start: 2020-01-01 | End: 2020-01-01

## 2020-01-01 RX ORDER — DIPHENHYDRAMINE HYDROCHLORIDE 50 MG/ML
25-50 INJECTION, SOLUTION INTRAMUSCULAR; INTRAVENOUS
Status: DISCONTINUED | OUTPATIENT
Start: 2020-01-01 | End: 2020-01-01

## 2020-01-01 RX ORDER — MIDAZOLAM HYDROCHLORIDE 1 MG/ML
INJECTION, SOLUTION INTRAMUSCULAR; INTRAVENOUS
Status: COMPLETED
Start: 2020-01-01 | End: 2020-01-01

## 2020-01-01 RX ORDER — MORPHINE SULFATE 10 MG/ML
3 INJECTION, SOLUTION INTRAMUSCULAR; INTRAVENOUS
Status: DISCONTINUED | OUTPATIENT
Start: 2020-01-01 | End: 2020-01-01

## 2020-01-01 RX ORDER — DEXTROSE MONOHYDRATE 25 G/50ML
12.5 INJECTION, SOLUTION INTRAVENOUS
Status: COMPLETED | OUTPATIENT
Start: 2020-01-01 | End: 2020-01-01

## 2020-01-01 RX ORDER — LIDOCAINE HYDROCHLORIDE 10 MG/ML
0.1 INJECTION INFILTRATION; PERINEURAL AS NEEDED
Status: DISCONTINUED | OUTPATIENT
Start: 2020-01-01 | End: 2020-01-01 | Stop reason: HOSPADM

## 2020-01-01 RX ORDER — FAMOTIDINE 20 MG/1
20 TABLET, FILM COATED ORAL DAILY
Status: DISCONTINUED | OUTPATIENT
Start: 2020-01-01 | End: 2020-01-01

## 2020-01-01 RX ORDER — NOREPINEPHRINE BITARTRATE/D5W 4MG/250ML
PLASTIC BAG, INJECTION (ML) INTRAVENOUS
Status: DISCONTINUED
Start: 2020-01-01 | End: 2020-01-01 | Stop reason: WASHOUT

## 2020-01-01 RX ORDER — MIDAZOLAM HYDROCHLORIDE 1 MG/ML
2 INJECTION, SOLUTION INTRAMUSCULAR; INTRAVENOUS
Status: ACTIVE | OUTPATIENT
Start: 2020-01-01 | End: 2020-01-01

## 2020-01-01 RX ORDER — METRONIDAZOLE 500 MG/100ML
500 INJECTION, SOLUTION INTRAVENOUS EVERY 12 HOURS
Status: COMPLETED | OUTPATIENT
Start: 2020-01-01 | End: 2020-01-01

## 2020-01-01 RX ORDER — BENZONATATE 100 MG/1
100 CAPSULE ORAL
Status: DISCONTINUED | OUTPATIENT
Start: 2020-01-01 | End: 2020-01-01 | Stop reason: HOSPADM

## 2020-01-01 RX ORDER — VANCOMYCIN 1.75 GRAM/500 ML IN 0.9 % SODIUM CHLORIDE INTRAVENOUS
1750 EVERY 24 HOURS
Status: DISCONTINUED | OUTPATIENT
Start: 2020-01-01 | End: 2020-01-01

## 2020-01-01 RX ORDER — WARFARIN 1 MG/1
3 TABLET ORAL
Status: COMPLETED | OUTPATIENT
Start: 2020-01-01 | End: 2020-01-01

## 2020-01-01 RX ORDER — FLUDROCORTISONE ACETATE 0.1 MG/1
0.1 TABLET ORAL DAILY
Status: DISCONTINUED | OUTPATIENT
Start: 2020-01-01 | End: 2020-01-01 | Stop reason: HOSPADM

## 2020-01-01 RX ORDER — INSULIN GLARGINE 100 [IU]/ML
40 INJECTION, SOLUTION SUBCUTANEOUS DAILY
Status: DISCONTINUED | OUTPATIENT
Start: 2020-01-01 | End: 2020-01-01

## 2020-01-01 RX ORDER — MIDAZOLAM HYDROCHLORIDE 1 MG/ML
.25-5 INJECTION, SOLUTION INTRAMUSCULAR; INTRAVENOUS
Status: DISCONTINUED | OUTPATIENT
Start: 2020-01-01 | End: 2020-01-01 | Stop reason: HOSPADM

## 2020-01-01 RX ORDER — ONDANSETRON 2 MG/ML
INJECTION INTRAMUSCULAR; INTRAVENOUS AS NEEDED
Status: DISCONTINUED | OUTPATIENT
Start: 2020-01-01 | End: 2020-01-01 | Stop reason: HOSPADM

## 2020-01-01 RX ORDER — LIDOCAINE HYDROCHLORIDE 40 MG/ML
SOLUTION TOPICAL ONCE
Status: COMPLETED | OUTPATIENT
Start: 2020-01-01 | End: 2020-01-01

## 2020-01-01 RX ORDER — INSULIN LISPRO 100 [IU]/ML
8 INJECTION, SOLUTION INTRAVENOUS; SUBCUTANEOUS
Status: DISCONTINUED | OUTPATIENT
Start: 2020-01-01 | End: 2020-01-01

## 2020-01-01 RX ORDER — FUROSEMIDE 40 MG/1
40 TABLET ORAL DAILY
Status: DISCONTINUED | OUTPATIENT
Start: 2020-01-01 | End: 2020-01-01

## 2020-01-01 RX ORDER — FUROSEMIDE 10 MG/ML
40 INJECTION INTRAMUSCULAR; INTRAVENOUS
Status: COMPLETED | OUTPATIENT
Start: 2020-01-01 | End: 2020-01-01

## 2020-01-01 RX ORDER — CEFAZOLIN SODIUM/WATER 2 G/20 ML
2 SYRINGE (ML) INTRAVENOUS ONCE
Status: CANCELLED | OUTPATIENT
Start: 2020-01-01 | End: 2020-01-01

## 2020-01-01 RX ORDER — LIDOCAINE HYDROCHLORIDE 20 MG/ML
15 SOLUTION OROPHARYNGEAL AS NEEDED
Status: DISCONTINUED | OUTPATIENT
Start: 2020-01-01 | End: 2020-01-01 | Stop reason: HOSPADM

## 2020-01-01 RX ORDER — INSULIN GLARGINE 100 [IU]/ML
13 INJECTION, SOLUTION SUBCUTANEOUS
Status: DISCONTINUED | OUTPATIENT
Start: 2020-01-01 | End: 2020-01-01

## 2020-01-01 RX ORDER — FENTANYL CITRATE 50 UG/ML
50 INJECTION, SOLUTION INTRAMUSCULAR; INTRAVENOUS
Status: DISCONTINUED | OUTPATIENT
Start: 2020-01-01 | End: 2020-01-01 | Stop reason: HOSPADM

## 2020-01-01 RX ORDER — MAG HYDROX/ALUMINUM HYD/SIMETH 200-200-20
30 SUSPENSION, ORAL (FINAL DOSE FORM) ORAL
Status: DISCONTINUED | OUTPATIENT
Start: 2020-01-01 | End: 2020-01-01 | Stop reason: HOSPADM

## 2020-01-01 RX ORDER — ALPRAZOLAM 0.25 MG/1
0.25 TABLET ORAL
Qty: 30 TAB | Refills: 0 | Status: SHIPPED | OUTPATIENT
Start: 2020-01-01 | End: 2020-01-01

## 2020-01-01 RX ORDER — VANCOMYCIN/0.9 % SOD CHLORIDE 1.5G/250ML
1500 PLASTIC BAG, INJECTION (ML) INTRAVENOUS EVERY 24 HOURS
Status: DISCONTINUED | OUTPATIENT
Start: 2020-01-01 | End: 2020-01-01

## 2020-01-01 RX ORDER — NOREPINEPHRINE BITARTRATE/D5W 4MG/250ML
PLASTIC BAG, INJECTION (ML) INTRAVENOUS
Status: COMPLETED
Start: 2020-01-01 | End: 2020-01-01

## 2020-01-01 RX ORDER — INSULIN GLARGINE 100 [IU]/ML
28 INJECTION, SOLUTION SUBCUTANEOUS DAILY
Status: DISCONTINUED | OUTPATIENT
Start: 2020-01-01 | End: 2020-01-01 | Stop reason: HOSPADM

## 2020-01-01 RX ORDER — PHYTONADIONE 5 MG/1
10 TABLET ORAL
Status: COMPLETED | OUTPATIENT
Start: 2020-01-01 | End: 2020-01-01

## 2020-01-01 RX ORDER — MORPHINE SULFATE 20 MG/ML
10 SOLUTION ORAL
Qty: 9 ML | Refills: 0 | Status: SHIPPED | OUTPATIENT
Start: 2020-01-01 | End: 2020-01-01

## 2020-01-01 RX ORDER — FUROSEMIDE 10 MG/ML
60 INJECTION INTRAMUSCULAR; INTRAVENOUS ONCE
Status: COMPLETED | OUTPATIENT
Start: 2020-01-01 | End: 2020-01-01

## 2020-01-01 RX ORDER — LEVOFLOXACIN 500 MG/1
500 TABLET, FILM COATED ORAL
Status: COMPLETED | OUTPATIENT
Start: 2020-01-01 | End: 2020-01-01

## 2020-01-01 RX ORDER — ALPRAZOLAM 0.25 MG/1
0.25 TABLET ORAL
Status: DISCONTINUED | OUTPATIENT
Start: 2020-01-01 | End: 2020-01-01 | Stop reason: HOSPADM

## 2020-01-01 RX ORDER — WARFARIN 2.5 MG/1
2.5 TABLET ORAL EVERY EVENING
Status: DISCONTINUED | OUTPATIENT
Start: 2020-01-01 | End: 2020-01-01

## 2020-01-01 RX ORDER — INSULIN GLARGINE 100 [IU]/ML
16 INJECTION, SOLUTION SUBCUTANEOUS
Status: DISCONTINUED | OUTPATIENT
Start: 2020-01-01 | End: 2020-01-01

## 2020-01-01 RX ORDER — NALOXONE HYDROCHLORIDE 0.4 MG/ML
0.4 INJECTION, SOLUTION INTRAMUSCULAR; INTRAVENOUS; SUBCUTANEOUS AS NEEDED
Status: DISCONTINUED | OUTPATIENT
Start: 2020-01-01 | End: 2020-01-01

## 2020-01-01 RX ORDER — DIPHENHYDRAMINE HCL 25 MG
25 CAPSULE ORAL
Status: DISCONTINUED | OUTPATIENT
Start: 2020-01-01 | End: 2020-01-01

## 2020-01-01 RX ORDER — POTASSIUM CHLORIDE 29.8 MG/ML
20 INJECTION INTRAVENOUS ONCE
Status: DISCONTINUED | OUTPATIENT
Start: 2020-01-01 | End: 2020-01-01

## 2020-01-01 RX ORDER — FUROSEMIDE 10 MG/ML
80 INJECTION INTRAMUSCULAR; INTRAVENOUS ONCE
Status: COMPLETED | OUTPATIENT
Start: 2020-01-01 | End: 2020-01-01

## 2020-01-01 RX ORDER — LIDOCAINE HYDROCHLORIDE 20 MG/ML
INJECTION, SOLUTION EPIDURAL; INFILTRATION; INTRACAUDAL; PERINEURAL AS NEEDED
Status: DISCONTINUED | OUTPATIENT
Start: 2020-01-01 | End: 2020-01-01 | Stop reason: HOSPADM

## 2020-01-01 RX ORDER — WARFARIN 7.5 MG/1
7.5 TABLET ORAL
Status: DISCONTINUED | OUTPATIENT
Start: 2020-01-01 | End: 2020-01-01 | Stop reason: HOSPADM

## 2020-01-01 RX ORDER — SODIUM BICARBONATE 1 MEQ/ML
50 SYRINGE (ML) INTRAVENOUS AS NEEDED
Status: DISCONTINUED | OUTPATIENT
Start: 2020-01-01 | End: 2020-01-01 | Stop reason: SDUPTHER

## 2020-01-01 RX ORDER — METHYLENE BLUE 10 MG/ML
0.2 INJECTION INTRAVENOUS ONCE
Status: DISCONTINUED | OUTPATIENT
Start: 2020-01-01 | End: 2020-01-01 | Stop reason: SDUPTHER

## 2020-01-01 RX ORDER — INSULIN GLARGINE 100 [IU]/ML
45 INJECTION, SOLUTION SUBCUTANEOUS
Status: DISCONTINUED | OUTPATIENT
Start: 2020-01-01 | End: 2020-01-01

## 2020-01-01 RX ORDER — INSULIN LISPRO 100 [IU]/ML
10 INJECTION, SOLUTION INTRAVENOUS; SUBCUTANEOUS
Status: DISCONTINUED | OUTPATIENT
Start: 2020-01-01 | End: 2020-01-01

## 2020-01-01 RX ORDER — TRAZODONE HYDROCHLORIDE 50 MG/1
100 TABLET ORAL
Status: DISCONTINUED | OUTPATIENT
Start: 2020-01-01 | End: 2020-01-01

## 2020-01-01 RX ORDER — BUSPIRONE HYDROCHLORIDE 5 MG/1
10 TABLET ORAL 3 TIMES DAILY
Status: DISCONTINUED | OUTPATIENT
Start: 2020-01-01 | End: 2020-01-01

## 2020-01-01 RX ORDER — INSULIN GLARGINE 100 [IU]/ML
INJECTION, SOLUTION SUBCUTANEOUS
Qty: 1 VIAL | Refills: 0 | Status: SHIPPED | OUTPATIENT
Start: 2020-01-01 | End: 2020-01-01 | Stop reason: SDUPTHER

## 2020-01-01 RX ORDER — METHYLENE BLUE 10 MG/ML
1 INJECTION INTRAVENOUS ONCE
Status: DISCONTINUED | OUTPATIENT
Start: 2020-01-01 | End: 2020-01-01 | Stop reason: SDUPTHER

## 2020-01-01 RX ORDER — WARFARIN 1 MG/1
3 TABLET ORAL EVERY EVENING
Status: DISCONTINUED | OUTPATIENT
Start: 2020-01-01 | End: 2020-01-01

## 2020-01-01 RX ORDER — SODIUM CHLORIDE 9 MG/ML
1000 INJECTION, SOLUTION INTRAVENOUS ONCE
Status: COMPLETED | OUTPATIENT
Start: 2020-01-01 | End: 2020-01-01

## 2020-01-01 RX ORDER — INSULIN GLARGINE 100 [IU]/ML
13 INJECTION, SOLUTION SUBCUTANEOUS DAILY
Status: DISCONTINUED | OUTPATIENT
Start: 2020-01-01 | End: 2020-01-01

## 2020-01-01 RX ORDER — NOREPINEPHRINE BITARTRATE/D5W 4MG/250ML
.05-.2 PLASTIC BAG, INJECTION (ML) INTRAVENOUS
Status: DISCONTINUED | OUTPATIENT
Start: 2020-01-01 | End: 2020-01-01

## 2020-01-01 RX ORDER — INSULIN GLARGINE 100 [IU]/ML
25 INJECTION, SOLUTION SUBCUTANEOUS
Status: DISCONTINUED | OUTPATIENT
Start: 2020-01-01 | End: 2020-01-01 | Stop reason: HOSPADM

## 2020-01-01 RX ORDER — FUROSEMIDE 10 MG/ML
80 INJECTION INTRAMUSCULAR; INTRAVENOUS
Status: DISCONTINUED | OUTPATIENT
Start: 2020-01-01 | End: 2020-01-01

## 2020-01-01 RX ORDER — HYDROMORPHONE HYDROCHLORIDE 2 MG/ML
0.5 INJECTION, SOLUTION INTRAMUSCULAR; INTRAVENOUS; SUBCUTANEOUS
Status: CANCELLED | OUTPATIENT
Start: 2020-01-01

## 2020-01-01 RX ORDER — FUROSEMIDE 10 MG/ML
80 INJECTION INTRAMUSCULAR; INTRAVENOUS
Status: COMPLETED | OUTPATIENT
Start: 2020-01-01 | End: 2020-01-01

## 2020-01-01 RX ORDER — FENTANYL CITRATE-0.9 % NACL/PF 25 MCG/ML
25-200 PLASTIC BAG, INJECTION (ML) INJECTION
Status: DISCONTINUED | OUTPATIENT
Start: 2020-01-01 | End: 2020-01-01

## 2020-01-01 RX ORDER — FUROSEMIDE 40 MG/1
40 TABLET ORAL DAILY
Status: DISCONTINUED | OUTPATIENT
Start: 2020-01-01 | End: 2020-01-01 | Stop reason: SDUPTHER

## 2020-01-01 RX ORDER — VANCOMYCIN/0.9 % SOD CHLORIDE 1.5G/250ML
1500 PLASTIC BAG, INJECTION (ML) INTRAVENOUS ONCE
Status: DISCONTINUED | OUTPATIENT
Start: 2020-01-01 | End: 2020-01-01

## 2020-01-01 RX ORDER — DEXTROSE MONOHYDRATE 25 G/50ML
INJECTION, SOLUTION INTRAVENOUS
Status: COMPLETED
Start: 2020-01-01 | End: 2020-01-01

## 2020-01-01 RX ORDER — WARFARIN 2 MG/1
4 TABLET ORAL ONCE
Status: COMPLETED | OUTPATIENT
Start: 2020-01-01 | End: 2020-01-01

## 2020-01-01 RX ORDER — ALPRAZOLAM 0.5 MG/1
1 TABLET ORAL ONCE
Status: COMPLETED | OUTPATIENT
Start: 2020-01-01 | End: 2020-01-01

## 2020-01-01 RX ORDER — NITROGLYCERIN 20 MG/100ML
INJECTION INTRAVENOUS
Status: DISCONTINUED | OUTPATIENT
Start: 2020-01-01 | End: 2020-01-01 | Stop reason: HOSPADM

## 2020-01-01 RX ORDER — CLINDAMYCIN PHOSPHATE 900 MG/50ML
900 INJECTION INTRAVENOUS ONCE
Status: DISCONTINUED | OUTPATIENT
Start: 2020-01-01 | End: 2020-01-01 | Stop reason: ALTCHOICE

## 2020-01-01 RX ORDER — INSULIN LISPRO 100 [IU]/ML
15 INJECTION, SOLUTION INTRAVENOUS; SUBCUTANEOUS
Status: DISCONTINUED | OUTPATIENT
Start: 2020-01-01 | End: 2020-01-01

## 2020-01-01 RX ORDER — HYDROMORPHONE HYDROCHLORIDE 2 MG/ML
0.5 INJECTION, SOLUTION INTRAMUSCULAR; INTRAVENOUS; SUBCUTANEOUS
Status: DISCONTINUED | OUTPATIENT
Start: 2020-01-01 | End: 2020-01-01 | Stop reason: HOSPADM

## 2020-01-01 RX ORDER — LIDOCAINE HCL/PF 100 MG/5ML
50-100 SYRINGE (ML) INTRAVENOUS
Status: ACTIVE | OUTPATIENT
Start: 2020-01-01 | End: 2020-01-01

## 2020-01-01 RX ORDER — POTASSIUM CHLORIDE 750 MG/1
10 TABLET, EXTENDED RELEASE ORAL DAILY
Status: DISCONTINUED | OUTPATIENT
Start: 2020-01-01 | End: 2020-01-01 | Stop reason: HOSPADM

## 2020-01-01 RX ORDER — MIDODRINE HYDROCHLORIDE 5 MG/1
5 TABLET ORAL 2 TIMES DAILY
Status: DISCONTINUED | OUTPATIENT
Start: 2020-01-01 | End: 2020-01-01 | Stop reason: HOSPADM

## 2020-01-01 RX ORDER — POTASSIUM CHLORIDE 14.9 MG/ML
20 INJECTION INTRAVENOUS
Status: COMPLETED | OUTPATIENT
Start: 2020-01-01 | End: 2020-01-01

## 2020-01-01 RX ORDER — INSULIN GLARGINE 100 [IU]/ML
15 INJECTION, SOLUTION SUBCUTANEOUS DAILY
Status: DISCONTINUED | OUTPATIENT
Start: 2020-01-01 | End: 2020-01-01

## 2020-01-01 RX ORDER — BUPROPION HYDROCHLORIDE 75 MG/1
75 TABLET ORAL 2 TIMES DAILY
Qty: 28 TAB | Refills: 0 | Status: SHIPPED | OUTPATIENT
Start: 2020-01-01 | End: 2020-01-01

## 2020-01-01 RX ORDER — AMIODARONE HYDROCHLORIDE 200 MG/1
200 TABLET ORAL EVERY 12 HOURS
Status: DISCONTINUED | OUTPATIENT
Start: 2020-01-01 | End: 2020-01-01 | Stop reason: SDUPTHER

## 2020-01-01 RX ORDER — SODIUM BICARBONATE 84 MG/ML
50 INJECTION, SOLUTION INTRAVENOUS AS NEEDED
Status: DISCONTINUED | OUTPATIENT
Start: 2020-01-01 | End: 2020-01-01

## 2020-01-01 RX ORDER — METOPROLOL TARTRATE 25 MG/1
25 TABLET, FILM COATED ORAL EVERY 12 HOURS
Status: DISCONTINUED | OUTPATIENT
Start: 2020-01-01 | End: 2020-01-01

## 2020-01-01 RX ORDER — CHLORHEXIDINE GLUCONATE 1.2 MG/ML
10 RINSE ORAL 2 TIMES DAILY
Status: DISCONTINUED | OUTPATIENT
Start: 2020-01-01 | End: 2020-01-01

## 2020-01-01 RX ORDER — BACITRACIN ZINC 500 UNIT/G
OINTMENT (GRAM) TOPICAL 2 TIMES DAILY
Status: DISCONTINUED | OUTPATIENT
Start: 2020-01-01 | End: 2020-01-01

## 2020-01-01 RX ORDER — WARFARIN 1 MG/1
2 TABLET ORAL ONCE
Status: COMPLETED | OUTPATIENT
Start: 2020-01-01 | End: 2020-01-01

## 2020-01-01 RX ORDER — CEFAZOLIN SODIUM/WATER 2 G/20 ML
2 SYRINGE (ML) INTRAVENOUS
Status: COMPLETED | OUTPATIENT
Start: 2020-01-01 | End: 2020-01-01

## 2020-01-01 RX ORDER — MIRTAZAPINE 15 MG/1
7.5 TABLET, FILM COATED ORAL
Status: DISCONTINUED | OUTPATIENT
Start: 2020-01-01 | End: 2020-01-01

## 2020-01-01 RX ORDER — ACETAMINOPHEN 10 MG/ML
INJECTION, SOLUTION INTRAVENOUS
Status: ACTIVE
Start: 2020-01-01 | End: 2020-01-01

## 2020-01-01 RX ORDER — MIDAZOLAM HYDROCHLORIDE 1 MG/ML
INJECTION, SOLUTION INTRAMUSCULAR; INTRAVENOUS AS NEEDED
Status: DISCONTINUED | OUTPATIENT
Start: 2020-01-01 | End: 2020-01-01 | Stop reason: HOSPADM

## 2020-01-01 RX ORDER — MORPHINE SULFATE 2 MG/ML
2 INJECTION, SOLUTION INTRAMUSCULAR; INTRAVENOUS ONCE
Status: COMPLETED | OUTPATIENT
Start: 2020-01-01 | End: 2020-01-01

## 2020-01-01 RX ORDER — NITROGLYCERIN 20 MG/100ML
10-100 INJECTION INTRAVENOUS
Status: DISCONTINUED | OUTPATIENT
Start: 2020-01-01 | End: 2020-01-01

## 2020-01-01 RX ORDER — OXYCODONE HYDROCHLORIDE 5 MG/1
10 TABLET ORAL
Status: COMPLETED | OUTPATIENT
Start: 2020-01-01 | End: 2020-01-01

## 2020-01-01 RX ORDER — LOPERAMIDE HYDROCHLORIDE 2 MG/1
4 CAPSULE ORAL
Status: DISCONTINUED | OUTPATIENT
Start: 2020-01-01 | End: 2020-01-01 | Stop reason: HOSPADM

## 2020-01-01 RX ORDER — LEVOFLOXACIN 500 MG/1
500 TABLET, FILM COATED ORAL
Status: DISCONTINUED | OUTPATIENT
Start: 2020-01-01 | End: 2020-01-01 | Stop reason: HOSPADM

## 2020-01-01 RX ORDER — LOPERAMIDE HYDROCHLORIDE 2 MG/1
2 CAPSULE ORAL
Status: DISCONTINUED | OUTPATIENT
Start: 2020-01-01 | End: 2020-01-01 | Stop reason: HOSPADM

## 2020-01-01 RX ORDER — ATORVASTATIN CALCIUM 40 MG/1
40 TABLET, FILM COATED ORAL
Status: DISCONTINUED | OUTPATIENT
Start: 2020-01-01 | End: 2020-01-01 | Stop reason: HOSPADM

## 2020-01-01 RX ORDER — SERTRALINE HYDROCHLORIDE 50 MG/1
25 TABLET, FILM COATED ORAL DAILY
Status: DISCONTINUED | OUTPATIENT
Start: 2020-01-01 | End: 2020-01-01

## 2020-01-01 RX ORDER — DEXTROSE, SODIUM CHLORIDE, AND POTASSIUM CHLORIDE 5; .45; .15 G/100ML; G/100ML; G/100ML
25 INJECTION INTRAVENOUS CONTINUOUS
Status: DISCONTINUED | OUTPATIENT
Start: 2020-01-01 | End: 2020-01-01 | Stop reason: SDUPTHER

## 2020-01-01 RX ORDER — FUROSEMIDE 40 MG/1
40 TABLET ORAL 2 TIMES DAILY
Qty: 90 TAB | Refills: 3 | Status: SHIPPED | OUTPATIENT
Start: 2020-01-01 | End: 2020-01-01

## 2020-01-01 RX ORDER — IPRATROPIUM BROMIDE AND ALBUTEROL SULFATE 2.5; .5 MG/3ML; MG/3ML
SOLUTION RESPIRATORY (INHALATION)
Status: ACTIVE
Start: 2020-01-01 | End: 2020-01-01

## 2020-01-01 RX ORDER — WARFARIN 1 MG/1
2 TABLET ORAL EVERY EVENING
Status: DISCONTINUED | OUTPATIENT
Start: 2020-01-01 | End: 2020-01-01

## 2020-01-01 RX ORDER — MIDAZOLAM HYDROCHLORIDE 1 MG/ML
4 INJECTION, SOLUTION INTRAMUSCULAR; INTRAVENOUS ONCE
Status: COMPLETED | OUTPATIENT
Start: 2020-01-01 | End: 2020-01-01

## 2020-01-01 RX ORDER — BACITRACIN 500 [USP'U]/G
OINTMENT TOPICAL 2 TIMES DAILY
Qty: 30 G | Refills: 0 | Status: SHIPPED | OUTPATIENT
Start: 2020-01-01 | End: 2020-01-01

## 2020-01-01 RX ORDER — WARFARIN SODIUM 5 MG/1
5 TABLET ORAL ONCE
Status: COMPLETED | OUTPATIENT
Start: 2020-01-01 | End: 2020-01-01

## 2020-01-01 RX ORDER — FLUMAZENIL 0.1 MG/ML
0.2 INJECTION INTRAVENOUS
Status: DISCONTINUED | OUTPATIENT
Start: 2020-01-01 | End: 2020-01-01 | Stop reason: HOSPADM

## 2020-01-01 RX ORDER — SODIUM CHLORIDE 9 MG/ML
INJECTION, SOLUTION INTRAVENOUS
Status: DISCONTINUED | OUTPATIENT
Start: 2020-01-01 | End: 2020-01-01 | Stop reason: HOSPADM

## 2020-01-01 RX ORDER — DEXTROSE 50 % IN WATER (D50W) INTRAVENOUS SYRINGE
AS NEEDED
Status: DISCONTINUED | OUTPATIENT
Start: 2020-01-01 | End: 2020-01-01 | Stop reason: HOSPADM

## 2020-01-01 RX ORDER — WARFARIN 1 MG/1
2.5 TABLET ORAL EVERY EVENING
Status: DISCONTINUED | OUTPATIENT
Start: 2020-01-01 | End: 2020-01-01 | Stop reason: HOSPADM

## 2020-01-01 RX ORDER — BUSPIRONE HYDROCHLORIDE 5 MG/1
5 TABLET ORAL 3 TIMES DAILY
Status: DISCONTINUED | OUTPATIENT
Start: 2020-01-01 | End: 2020-01-01

## 2020-01-01 RX ORDER — ALBUMIN HUMAN 50 G/1000ML
SOLUTION INTRAVENOUS
Status: COMPLETED | OUTPATIENT
Start: 2020-01-01 | End: 2020-01-01

## 2020-01-01 RX ORDER — SUCCINYLCHOLINE CHLORIDE 20 MG/ML
INJECTION INTRAMUSCULAR; INTRAVENOUS AS NEEDED
Status: DISCONTINUED | OUTPATIENT
Start: 2020-01-01 | End: 2020-01-01 | Stop reason: HOSPADM

## 2020-01-01 RX ORDER — LIDOCAINE HYDROCHLORIDE 20 MG/ML
JELLY TOPICAL ONCE
Status: COMPLETED | OUTPATIENT
Start: 2020-01-01 | End: 2020-01-01

## 2020-01-01 RX ORDER — FUROSEMIDE 10 MG/ML
40 INJECTION INTRAMUSCULAR; INTRAVENOUS ONCE
Status: COMPLETED | OUTPATIENT
Start: 2020-01-01 | End: 2020-01-01

## 2020-01-01 RX ORDER — BUSPIRONE HYDROCHLORIDE 10 MG/1
10 TABLET ORAL 3 TIMES DAILY
Qty: 270 TAB | Refills: 3 | Status: SHIPPED | OUTPATIENT
Start: 2020-01-01 | End: 2020-01-01

## 2020-01-01 RX ORDER — BUSPIRONE HYDROCHLORIDE 10 MG/1
10 TABLET ORAL 3 TIMES DAILY
Status: DISCONTINUED | OUTPATIENT
Start: 2020-01-01 | End: 2020-01-01 | Stop reason: HOSPADM

## 2020-01-01 RX ORDER — MIDODRINE HYDROCHLORIDE 5 MG/1
10 TABLET ORAL 3 TIMES DAILY
Status: DISCONTINUED | OUTPATIENT
Start: 2020-01-01 | End: 2020-01-01 | Stop reason: HOSPADM

## 2020-01-01 RX ORDER — SODIUM CHLORIDE 9 MG/ML
50 INJECTION, SOLUTION INTRAVENOUS CONTINUOUS
Status: DISPENSED | OUTPATIENT
Start: 2020-01-01 | End: 2020-01-01

## 2020-01-01 RX ORDER — LEVOFLOXACIN 500 MG/1
500 TABLET, FILM COATED ORAL
Qty: 4 TAB | Refills: 0 | Status: SHIPPED
Start: 2020-01-01 | End: 2020-01-01

## 2020-01-01 RX ORDER — MIRTAZAPINE 15 MG/1
15 TABLET, FILM COATED ORAL
Status: DISCONTINUED | OUTPATIENT
Start: 2020-01-01 | End: 2020-01-01

## 2020-01-01 RX ADMIN — Medication 5 MG: at 07:58

## 2020-01-01 RX ADMIN — INSULIN LISPRO 4 UNITS: 100 INJECTION, SOLUTION INTRAVENOUS; SUBCUTANEOUS at 21:19

## 2020-01-01 RX ADMIN — SODIUM CHLORIDE SOLN NEBU 3% 4 ML: 3 NEBU SOLN at 20:50

## 2020-01-01 RX ADMIN — INSULIN LISPRO 9 UNITS: 100 INJECTION, SOLUTION INTRAVENOUS; SUBCUTANEOUS at 11:24

## 2020-01-01 RX ADMIN — Medication 10 ML: at 00:47

## 2020-01-01 RX ADMIN — ALPRAZOLAM 0.5 MG: 0.5 TABLET ORAL at 16:47

## 2020-01-01 RX ADMIN — PHENYLEPHRINE HYDROCHLORIDE 120 MCG: 10 INJECTION INTRAVENOUS at 10:44

## 2020-01-01 RX ADMIN — ALBUTEROL SULFATE 2.5 MG: 2.5 SOLUTION RESPIRATORY (INHALATION) at 19:58

## 2020-01-01 RX ADMIN — ATORVASTATIN CALCIUM 80 MG: 40 TABLET, FILM COATED ORAL at 20:27

## 2020-01-01 RX ADMIN — INSULIN GLARGINE 15 UNITS: 100 INJECTION, SOLUTION SUBCUTANEOUS at 08:11

## 2020-01-01 RX ADMIN — TAMSULOSIN HYDROCHLORIDE 0.4 MG: 0.4 CAPSULE ORAL at 22:11

## 2020-01-01 RX ADMIN — Medication 10 ML: at 21:02

## 2020-01-01 RX ADMIN — SODIUM BICARBONATE 50 MEQ: 84 INJECTION, SOLUTION INTRAVENOUS at 18:00

## 2020-01-01 RX ADMIN — BUSPIRONE HYDROCHLORIDE 10 MG: 10 TABLET ORAL at 17:30

## 2020-01-01 RX ADMIN — TRAZODONE HYDROCHLORIDE 100 MG: 50 TABLET ORAL at 21:15

## 2020-01-01 RX ADMIN — BACITRACIN: 500 OINTMENT TOPICAL at 17:02

## 2020-01-01 RX ADMIN — Medication 10 ML: at 13:18

## 2020-01-01 RX ADMIN — VASOPRESSIN 0.04 UNITS/MIN: 20 INJECTION INTRAVENOUS at 20:14

## 2020-01-01 RX ADMIN — MIDODRINE HYDROCHLORIDE 10 MG: 5 TABLET ORAL at 22:19

## 2020-01-01 RX ADMIN — MIDODRINE HYDROCHLORIDE 5 MG: 5 TABLET ORAL at 08:54

## 2020-01-01 RX ADMIN — INSULIN LISPRO 9 UNITS: 100 INJECTION, SOLUTION INTRAVENOUS; SUBCUTANEOUS at 20:39

## 2020-01-01 RX ADMIN — POLYETHYLENE GLYCOL 3350 17 G: 17 POWDER, FOR SOLUTION ORAL at 08:19

## 2020-01-01 RX ADMIN — Medication 75 MCG/HR: at 04:40

## 2020-01-01 RX ADMIN — INSULIN LISPRO 15 UNITS: 100 INJECTION, SOLUTION INTRAVENOUS; SUBCUTANEOUS at 16:04

## 2020-01-01 RX ADMIN — DEXMEDETOMIDINE 0.6 MCG/KG/HR: 100 INJECTION, SOLUTION, CONCENTRATE INTRAVENOUS at 05:20

## 2020-01-01 RX ADMIN — Medication 10 ML: at 13:16

## 2020-01-01 RX ADMIN — FAMOTIDINE 20 MG: 40 POWDER, FOR SUSPENSION ORAL at 09:04

## 2020-01-01 RX ADMIN — CEFEPIME HYDROCHLORIDE 2 G: 2 INJECTION, POWDER, FOR SOLUTION INTRAVENOUS at 21:31

## 2020-01-01 RX ADMIN — SODIUM CHLORIDE SOLN NEBU 3% 4 ML: 3 NEBU SOLN at 19:59

## 2020-01-01 RX ADMIN — INSULIN LISPRO 3 UNITS: 100 INJECTION, SOLUTION INTRAVENOUS; SUBCUTANEOUS at 16:12

## 2020-01-01 RX ADMIN — Medication: at 09:07

## 2020-01-01 RX ADMIN — METRONIDAZOLE 500 MG: 500 INJECTION, SOLUTION INTRAVENOUS at 08:35

## 2020-01-01 RX ADMIN — INSULIN LISPRO 3 UNITS: 100 INJECTION, SOLUTION INTRAVENOUS; SUBCUTANEOUS at 17:39

## 2020-01-01 RX ADMIN — SODIUM CHLORIDE SOLN NEBU 3% 4 ML: 3 NEBU SOLN at 08:00

## 2020-01-01 RX ADMIN — BUSPIRONE HYDROCHLORIDE 5 MG: 5 TABLET ORAL at 17:21

## 2020-01-01 RX ADMIN — BUSPIRONE HYDROCHLORIDE 10 MG: 5 TABLET ORAL at 16:23

## 2020-01-01 RX ADMIN — MIDODRINE HYDROCHLORIDE 5 MG: 5 TABLET ORAL at 12:07

## 2020-01-01 RX ADMIN — AMIODARONE HYDROCHLORIDE 0.5 MG/MIN: 50 INJECTION, SOLUTION INTRAVENOUS at 01:00

## 2020-01-01 RX ADMIN — INSULIN HUMAN 5 UNITS: 100 INJECTION, SOLUTION PARENTERAL at 11:31

## 2020-01-01 RX ADMIN — MEROPENEM 500 MG: 500 INJECTION, POWDER, FOR SOLUTION INTRAVENOUS at 20:36

## 2020-01-01 RX ADMIN — DEXTROSE MONOHYDRATE 6.5 G: 25 INJECTION, SOLUTION INTRAVENOUS at 07:13

## 2020-01-01 RX ADMIN — INSULIN LISPRO 2 UNITS: 100 INJECTION, SOLUTION INTRAVENOUS; SUBCUTANEOUS at 21:13

## 2020-01-01 RX ADMIN — GUAIFENESIN 1200 MG: 600 TABLET ORAL at 09:22

## 2020-01-01 RX ADMIN — INSULIN LISPRO 5 UNITS: 100 INJECTION, SOLUTION INTRAVENOUS; SUBCUTANEOUS at 16:47

## 2020-01-01 RX ADMIN — BUSPIRONE HYDROCHLORIDE 10 MG: 10 TABLET ORAL at 16:23

## 2020-01-01 RX ADMIN — ONDANSETRON 4 MG: 2 INJECTION INTRAMUSCULAR; INTRAVENOUS at 02:29

## 2020-01-01 RX ADMIN — BUSPIRONE HYDROCHLORIDE 5 MG: 5 TABLET ORAL at 21:49

## 2020-01-01 RX ADMIN — INSULIN LISPRO 3 UNITS: 100 INJECTION, SOLUTION INTRAVENOUS; SUBCUTANEOUS at 06:48

## 2020-01-01 RX ADMIN — AMIODARONE HYDROCHLORIDE 400 MG: 200 TABLET ORAL at 21:34

## 2020-01-01 RX ADMIN — NITROGLYCERIN 1 INCH: 20 OINTMENT TOPICAL at 20:36

## 2020-01-01 RX ADMIN — MORPHINE SULFATE 5 MG: 10 INJECTION INTRAVENOUS at 00:36

## 2020-01-01 RX ADMIN — TAMSULOSIN HYDROCHLORIDE 0.4 MG: 0.4 CAPSULE ORAL at 21:58

## 2020-01-01 RX ADMIN — TUBERCULIN PURIFIED PROTEIN DERIVATIVE 5 UNITS: 5 INJECTION, SOLUTION INTRADERMAL at 16:48

## 2020-01-01 RX ADMIN — INSULIN LISPRO 4 UNITS: 100 INJECTION, SOLUTION INTRAVENOUS; SUBCUTANEOUS at 12:14

## 2020-01-01 RX ADMIN — Medication 10 ML: at 06:00

## 2020-01-01 RX ADMIN — ALBUMIN (HUMAN) 250 ML: 12.5 INJECTION, SOLUTION INTRAVENOUS at 13:13

## 2020-01-01 RX ADMIN — MORPHINE SULFATE 2 MG: 2 INJECTION, SOLUTION INTRAMUSCULAR; INTRAVENOUS at 21:30

## 2020-01-01 RX ADMIN — SERTRALINE HYDROCHLORIDE 100 MG: 100 TABLET ORAL at 08:27

## 2020-01-01 RX ADMIN — HEPARIN SODIUM 1000 UNITS: 1000 INJECTION INTRAVENOUS; SUBCUTANEOUS at 07:49

## 2020-01-01 RX ADMIN — IPRATROPIUM BROMIDE AND ALBUTEROL SULFATE 3 ML: .5; 3 SOLUTION RESPIRATORY (INHALATION) at 20:02

## 2020-01-01 RX ADMIN — NITROGLYCERIN 1 INCH: 20 OINTMENT TOPICAL at 08:10

## 2020-01-01 RX ADMIN — Medication 10 ML: at 17:06

## 2020-01-01 RX ADMIN — VECURONIUM BROMIDE 2 MG: 1 INJECTION, POWDER, LYOPHILIZED, FOR SOLUTION INTRAVENOUS at 10:00

## 2020-01-01 RX ADMIN — MORPHINE SULFATE 3 MG: 10 INJECTION INTRAVENOUS at 15:22

## 2020-01-01 RX ADMIN — Medication 2 SPRAY: at 17:31

## 2020-01-01 RX ADMIN — SERTRALINE HYDROCHLORIDE 100 MG: 100 TABLET ORAL at 12:40

## 2020-01-01 RX ADMIN — MEROPENEM 500 MG: 500 INJECTION, POWDER, FOR SOLUTION INTRAVENOUS at 03:05

## 2020-01-01 RX ADMIN — FUROSEMIDE 80 MG: 10 INJECTION, SOLUTION INTRAMUSCULAR; INTRAVENOUS at 12:38

## 2020-01-01 RX ADMIN — INSULIN LISPRO 9 UNITS: 100 INJECTION, SOLUTION INTRAVENOUS; SUBCUTANEOUS at 11:55

## 2020-01-01 RX ADMIN — BUSPIRONE HYDROCHLORIDE 10 MG: 10 TABLET ORAL at 08:30

## 2020-01-01 RX ADMIN — ALBUTEROL SULFATE 2.5 MG: 2.5 SOLUTION RESPIRATORY (INHALATION) at 19:49

## 2020-01-01 RX ADMIN — BUPROPION HYDROCHLORIDE 75 MG: 75 TABLET, FILM COATED ORAL at 17:02

## 2020-01-01 RX ADMIN — BUSPIRONE HYDROCHLORIDE 10 MG: 5 TABLET ORAL at 08:38

## 2020-01-01 RX ADMIN — CEFAZOLIN 3 G: 1 INJECTION, POWDER, FOR SOLUTION INTRAVENOUS at 12:00

## 2020-01-01 RX ADMIN — VASOPRESSIN 1 UNITS: 20 INJECTION INTRAVENOUS at 10:39

## 2020-01-01 RX ADMIN — PHENYLEPHRINE HYDROCHLORIDE 120 MCG: 10 INJECTION INTRAVENOUS at 07:58

## 2020-01-01 RX ADMIN — LORATADINE 10 MG: 10 TABLET ORAL at 12:06

## 2020-01-01 RX ADMIN — IPRATROPIUM BROMIDE AND ALBUTEROL SULFATE 3 ML: .5; 3 SOLUTION RESPIRATORY (INHALATION) at 16:26

## 2020-01-01 RX ADMIN — BUSPIRONE HYDROCHLORIDE 10 MG: 10 TABLET ORAL at 09:14

## 2020-01-01 RX ADMIN — Medication: at 08:47

## 2020-01-01 RX ADMIN — IPRATROPIUM BROMIDE AND ALBUTEROL SULFATE 3 ML: .5; 3 SOLUTION RESPIRATORY (INHALATION) at 11:39

## 2020-01-01 RX ADMIN — DEXMEDETOMIDINE 0.7 MCG/KG/HR: 100 INJECTION, SOLUTION, CONCENTRATE INTRAVENOUS at 06:28

## 2020-01-01 RX ADMIN — Medication: at 09:00

## 2020-01-01 RX ADMIN — MIDODRINE HYDROCHLORIDE 10 MG: 5 TABLET ORAL at 08:18

## 2020-01-01 RX ADMIN — FAMOTIDINE 20 MG: 40 POWDER, FOR SUSPENSION ORAL at 08:39

## 2020-01-01 RX ADMIN — FENTANYL CITRATE 200 MCG: 50 INJECTION INTRAMUSCULAR; INTRAVENOUS at 07:44

## 2020-01-01 RX ADMIN — DIPHENHYDRAMINE HYDROCHLORIDE 25 MG: 25 CAPSULE ORAL at 12:41

## 2020-01-01 RX ADMIN — Medication 1 AMPULE: at 10:15

## 2020-01-01 RX ADMIN — FUROSEMIDE 80 MG: 10 INJECTION, SOLUTION INTRAMUSCULAR; INTRAVENOUS at 12:43

## 2020-01-01 RX ADMIN — VASOPRESSIN 0.07 UNITS/MIN: 20 INJECTION INTRAVENOUS at 17:05

## 2020-01-01 RX ADMIN — TAMSULOSIN HYDROCHLORIDE 0.4 MG: 0.4 CAPSULE ORAL at 21:44

## 2020-01-01 RX ADMIN — GUAIFENESIN 1200 MG: 600 TABLET ORAL at 09:06

## 2020-01-01 RX ADMIN — Medication 10 ML: at 12:41

## 2020-01-01 RX ADMIN — MORPHINE SULFATE 3 MG: 10 INJECTION INTRAVENOUS at 15:31

## 2020-01-01 RX ADMIN — INSULIN LISPRO 4 UNITS: 100 INJECTION, SOLUTION INTRAVENOUS; SUBCUTANEOUS at 12:35

## 2020-01-01 RX ADMIN — INSULIN HUMAN 4 UNITS: 100 INJECTION, SOLUTION PARENTERAL at 12:15

## 2020-01-01 RX ADMIN — Medication 10 ML: at 03:48

## 2020-01-01 RX ADMIN — SODIUM CHLORIDE 2.5 UNITS/HR: 900 INJECTION, SOLUTION INTRAVENOUS at 18:31

## 2020-01-01 RX ADMIN — AMIODARONE HYDROCHLORIDE 400 MG: 200 TABLET ORAL at 09:14

## 2020-01-01 RX ADMIN — Medication 0.1 MCG/KG/MIN: at 13:38

## 2020-01-01 RX ADMIN — INSULIN LISPRO 6 UNITS: 100 INJECTION, SOLUTION INTRAVENOUS; SUBCUTANEOUS at 12:29

## 2020-01-01 RX ADMIN — INSULIN LISPRO 6 UNITS: 100 INJECTION, SOLUTION INTRAVENOUS; SUBCUTANEOUS at 12:34

## 2020-01-01 RX ADMIN — ALPRAZOLAM 0.25 MG: 0.25 TABLET ORAL at 21:55

## 2020-01-01 RX ADMIN — SODIUM CHLORIDE SOLN NEBU 3% 4 ML: 3 NEBU SOLN at 20:19

## 2020-01-01 RX ADMIN — INSULIN LISPRO 8 UNITS: 100 INJECTION, SOLUTION INTRAVENOUS; SUBCUTANEOUS at 16:54

## 2020-01-01 RX ADMIN — TAMSULOSIN HYDROCHLORIDE 0.4 MG: 0.4 CAPSULE ORAL at 22:46

## 2020-01-01 RX ADMIN — INSULIN GLARGINE 5 UNITS: 100 INJECTION, SOLUTION SUBCUTANEOUS at 22:15

## 2020-01-01 RX ADMIN — DEXMEDETOMIDINE 0.7 MCG/KG/HR: 100 INJECTION, SOLUTION, CONCENTRATE INTRAVENOUS at 21:36

## 2020-01-01 RX ADMIN — CHLORHEXIDINE GLUCONATE 10 ML: 1.2 RINSE ORAL at 09:24

## 2020-01-01 RX ADMIN — Medication 5 ML: at 22:29

## 2020-01-01 RX ADMIN — Medication 10 ML: at 02:14

## 2020-01-01 RX ADMIN — Medication 10 ML: at 14:45

## 2020-01-01 RX ADMIN — MEROPENEM 500 MG: 500 INJECTION, POWDER, FOR SOLUTION INTRAVENOUS at 21:53

## 2020-01-01 RX ADMIN — Medication 10 ML: at 13:27

## 2020-01-01 RX ADMIN — POLYETHYLENE GLYCOL 3350 17 G: 17 POWDER, FOR SOLUTION ORAL at 09:20

## 2020-01-01 RX ADMIN — BUPROPION HYDROCHLORIDE 75 MG: 75 TABLET, FILM COATED ORAL at 17:00

## 2020-01-01 RX ADMIN — BUSPIRONE HYDROCHLORIDE 10 MG: 10 TABLET ORAL at 20:21

## 2020-01-01 RX ADMIN — VASOPRESSIN 0.5 UNITS: 20 INJECTION INTRAVENOUS at 08:37

## 2020-01-01 RX ADMIN — IPRATROPIUM BROMIDE AND ALBUTEROL SULFATE 3 ML: .5; 3 SOLUTION RESPIRATORY (INHALATION) at 16:10

## 2020-01-01 RX ADMIN — CLINDAMYCIN PHOSPHATE 900 MG: 900 INJECTION, SOLUTION INTRAVENOUS at 13:33

## 2020-01-01 RX ADMIN — MIDODRINE HYDROCHLORIDE 10 MG: 5 TABLET ORAL at 08:02

## 2020-01-01 RX ADMIN — FUROSEMIDE 40 MG: 10 INJECTION, SOLUTION INTRAMUSCULAR; INTRAVENOUS at 17:58

## 2020-01-01 RX ADMIN — BUSPIRONE HYDROCHLORIDE 5 MG: 5 TABLET ORAL at 08:47

## 2020-01-01 RX ADMIN — TAMSULOSIN HYDROCHLORIDE 0.4 MG: 0.4 CAPSULE ORAL at 21:31

## 2020-01-01 RX ADMIN — SERTRALINE HYDROCHLORIDE 100 MG: 50 TABLET ORAL at 08:32

## 2020-01-01 RX ADMIN — VANCOMYCIN HYDROCHLORIDE 1750 MG: 10 INJECTION, POWDER, LYOPHILIZED, FOR SOLUTION INTRAVENOUS at 08:25

## 2020-01-01 RX ADMIN — INSULIN LISPRO 2 UNITS: 100 INJECTION, SOLUTION INTRAVENOUS; SUBCUTANEOUS at 06:19

## 2020-01-01 RX ADMIN — INSULIN GLARGINE 25 UNITS: 100 INJECTION, SOLUTION SUBCUTANEOUS at 21:13

## 2020-01-01 RX ADMIN — SODIUM CHLORIDE SOLN NEBU 3% 4 ML: 3 NEBU SOLN at 23:58

## 2020-01-01 RX ADMIN — MIDODRINE HYDROCHLORIDE 10 MG: 5 TABLET ORAL at 08:19

## 2020-01-01 RX ADMIN — SODIUM CHLORIDE 0.25 MCG/KG/MIN: 900 INJECTION, SOLUTION INTRAVENOUS at 03:54

## 2020-01-01 RX ADMIN — POLYETHYLENE GLYCOL 3350 17 G: 17 POWDER, FOR SOLUTION ORAL at 12:18

## 2020-01-01 RX ADMIN — INSULIN GLARGINE 45 UNITS: 100 INJECTION, SOLUTION SUBCUTANEOUS at 21:20

## 2020-01-01 RX ADMIN — Medication 10 ML: at 22:00

## 2020-01-01 RX ADMIN — IPRATROPIUM BROMIDE AND ALBUTEROL SULFATE 3 ML: .5; 3 SOLUTION RESPIRATORY (INHALATION) at 19:19

## 2020-01-01 RX ADMIN — BUSPIRONE HYDROCHLORIDE 10 MG: 10 TABLET ORAL at 17:16

## 2020-01-01 RX ADMIN — BUSPIRONE HYDROCHLORIDE 10 MG: 5 TABLET ORAL at 21:17

## 2020-01-01 RX ADMIN — MIDAZOLAM 1 MG: 1 INJECTION INTRAMUSCULAR; INTRAVENOUS at 10:01

## 2020-01-01 RX ADMIN — Medication 2 G: at 03:55

## 2020-01-01 RX ADMIN — Medication 1 AMPULE: at 21:30

## 2020-01-01 RX ADMIN — MEROPENEM 500 MG: 500 INJECTION, POWDER, FOR SOLUTION INTRAVENOUS at 04:00

## 2020-01-01 RX ADMIN — SODIUM CHLORIDE SOLN NEBU 3% 4 ML: 3 NEBU SOLN at 19:26

## 2020-01-01 RX ADMIN — BENZONATATE 100 MG: 100 CAPSULE ORAL at 10:28

## 2020-01-01 RX ADMIN — MIDODRINE HYDROCHLORIDE 10 MG: 5 TABLET ORAL at 11:22

## 2020-01-01 RX ADMIN — BUSPIRONE HYDROCHLORIDE 10 MG: 10 TABLET ORAL at 08:18

## 2020-01-01 RX ADMIN — Medication 10 ML: at 23:26

## 2020-01-01 RX ADMIN — BUSPIRONE HYDROCHLORIDE 10 MG: 10 TABLET ORAL at 11:47

## 2020-01-01 RX ADMIN — INSULIN GLARGINE 45 UNITS: 100 INJECTION, SOLUTION SUBCUTANEOUS at 21:55

## 2020-01-01 RX ADMIN — DEXMEDETOMIDINE 0.3 MCG/KG/HR: 100 INJECTION, SOLUTION, CONCENTRATE INTRAVENOUS at 15:53

## 2020-01-01 RX ADMIN — BUSPIRONE HYDROCHLORIDE 10 MG: 5 TABLET ORAL at 08:27

## 2020-01-01 RX ADMIN — Medication 2 SPRAY: at 17:01

## 2020-01-01 RX ADMIN — BUSPIRONE HYDROCHLORIDE 10 MG: 5 TABLET ORAL at 16:03

## 2020-01-01 RX ADMIN — MEROPENEM 500 MG: 500 INJECTION, POWDER, FOR SOLUTION INTRAVENOUS at 11:15

## 2020-01-01 RX ADMIN — ONDANSETRON 4 MG: 2 INJECTION INTRAMUSCULAR; INTRAVENOUS at 13:26

## 2020-01-01 RX ADMIN — BUPROPION HYDROCHLORIDE 75 MG: 75 TABLET, FILM COATED ORAL at 12:12

## 2020-01-01 RX ADMIN — INSULIN LISPRO 3 UNITS: 100 INJECTION, SOLUTION INTRAVENOUS; SUBCUTANEOUS at 22:00

## 2020-01-01 RX ADMIN — INSULIN HUMAN 10 UNITS: 100 INJECTION, SOLUTION PARENTERAL at 07:31

## 2020-01-01 RX ADMIN — MIDODRINE HYDROCHLORIDE 10 MG: 5 TABLET ORAL at 21:23

## 2020-01-01 RX ADMIN — MIDODRINE HYDROCHLORIDE 10 MG: 5 TABLET ORAL at 09:06

## 2020-01-01 RX ADMIN — NITROGLYCERIN 1 INCH: 20 OINTMENT TOPICAL at 08:44

## 2020-01-01 RX ADMIN — MIDODRINE HYDROCHLORIDE 10 MG: 5 TABLET ORAL at 08:17

## 2020-01-01 RX ADMIN — Medication 10 ML: at 15:00

## 2020-01-01 RX ADMIN — MIRTAZAPINE 15 MG: 15 TABLET, FILM COATED ORAL at 22:28

## 2020-01-01 RX ADMIN — TRAZODONE HYDROCHLORIDE 100 MG: 50 TABLET ORAL at 21:17

## 2020-01-01 RX ADMIN — SODIUM CHLORIDE SOLN NEBU 3% 4 ML: 3 NEBU SOLN at 08:13

## 2020-01-01 RX ADMIN — WARFARIN SODIUM 5 MG: 5 TABLET ORAL at 17:26

## 2020-01-01 RX ADMIN — NOREPINEPHRINE BITARTRATE 0.2 MCG/KG/MIN: 1 INJECTION, SOLUTION, CONCENTRATE INTRAVENOUS at 09:47

## 2020-01-01 RX ADMIN — INSULIN LISPRO 6 UNITS: 100 INJECTION, SOLUTION INTRAVENOUS; SUBCUTANEOUS at 17:27

## 2020-01-01 RX ADMIN — VASOPRESSIN 1 UNITS: 20 INJECTION INTRAVENOUS at 09:41

## 2020-01-01 RX ADMIN — BACITRACIN: 500 OINTMENT TOPICAL at 08:38

## 2020-01-01 RX ADMIN — SODIUM CHLORIDE 0.5 MCG/KG/MIN: 900 INJECTION, SOLUTION INTRAVENOUS at 12:33

## 2020-01-01 RX ADMIN — BACITRACIN: 500 OINTMENT TOPICAL at 09:59

## 2020-01-01 RX ADMIN — NOREPINEPHRINE BITARTRATE 0.06 MCG/KG/MIN: 1 INJECTION, SOLUTION, CONCENTRATE INTRAVENOUS at 06:21

## 2020-01-01 RX ADMIN — ALBUTEROL SULFATE 2.5 MG: 2.5 SOLUTION RESPIRATORY (INHALATION) at 08:22

## 2020-01-01 RX ADMIN — BUSPIRONE HYDROCHLORIDE 10 MG: 10 TABLET ORAL at 17:54

## 2020-01-01 RX ADMIN — HYDROXYZINE PAMOATE 25 MG: 25 CAPSULE ORAL at 21:57

## 2020-01-01 RX ADMIN — BISACODYL 5 MG: 5 TABLET, COATED ORAL at 08:58

## 2020-01-01 RX ADMIN — INSULIN HUMAN 15 UNITS: 100 INJECTION, SOLUTION PARENTERAL at 21:06

## 2020-01-01 RX ADMIN — FAMOTIDINE: 10 INJECTION INTRAVENOUS at 17:31

## 2020-01-01 RX ADMIN — OLANZAPINE 5 MG: 5 TABLET, FILM COATED ORAL at 21:53

## 2020-01-01 RX ADMIN — BUSPIRONE HYDROCHLORIDE 10 MG: 5 TABLET ORAL at 09:08

## 2020-01-01 RX ADMIN — BUSPIRONE HYDROCHLORIDE 10 MG: 10 TABLET ORAL at 08:24

## 2020-01-01 RX ADMIN — BUSPIRONE HYDROCHLORIDE 10 MG: 10 TABLET ORAL at 16:22

## 2020-01-01 RX ADMIN — BUPROPION HYDROCHLORIDE 75 MG: 75 TABLET, FILM COATED ORAL at 09:07

## 2020-01-01 RX ADMIN — SODIUM BICARBONATE 50 MEQ: 84 INJECTION, SOLUTION INTRAVENOUS at 15:11

## 2020-01-01 RX ADMIN — AMIODARONE HYDROCHLORIDE 200 MG: 200 TABLET ORAL at 11:57

## 2020-01-01 RX ADMIN — ALBUTEROL SULFATE 2.5 MG: 2.5 SOLUTION RESPIRATORY (INHALATION) at 21:17

## 2020-01-01 RX ADMIN — MIDODRINE HYDROCHLORIDE 10 MG: 5 TABLET ORAL at 08:29

## 2020-01-01 RX ADMIN — INSULIN LISPRO 15 UNITS: 100 INJECTION, SOLUTION INTRAVENOUS; SUBCUTANEOUS at 16:33

## 2020-01-01 RX ADMIN — Medication 5 ML: at 14:30

## 2020-01-01 RX ADMIN — DEXMEDETOMIDINE 0.7 MCG/KG/HR: 100 INJECTION, SOLUTION, CONCENTRATE INTRAVENOUS at 19:55

## 2020-01-01 RX ADMIN — BUSPIRONE HYDROCHLORIDE 10 MG: 10 TABLET ORAL at 17:39

## 2020-01-01 RX ADMIN — VANCOMYCIN HYDROCHLORIDE 1250 MG: 10 INJECTION, POWDER, LYOPHILIZED, FOR SOLUTION INTRAVENOUS at 10:55

## 2020-01-01 RX ADMIN — OLANZAPINE 5 MG: 5 TABLET, FILM COATED ORAL at 21:16

## 2020-01-01 RX ADMIN — NITROGLYCERIN 1 INCH: 20 OINTMENT TOPICAL at 12:21

## 2020-01-01 RX ADMIN — EPOETIN ALFA-EPBX 14000 UNITS: 10000 INJECTION, SOLUTION INTRAVENOUS; SUBCUTANEOUS at 09:41

## 2020-01-01 RX ADMIN — HEPARIN SODIUM 2000 UNITS: 1000 INJECTION INTRAVENOUS; SUBCUTANEOUS at 23:14

## 2020-01-01 RX ADMIN — Medication 5 MG: at 08:52

## 2020-01-01 RX ADMIN — NITROGLYCERIN 1 INCH: 20 OINTMENT TOPICAL at 11:00

## 2020-01-01 RX ADMIN — Medication 2 SPRAY: at 18:00

## 2020-01-01 RX ADMIN — LEVOFLOXACIN 500 MG: 5 INJECTION, SOLUTION INTRAVENOUS at 22:17

## 2020-01-01 RX ADMIN — INSULIN GLARGINE 10 UNITS: 100 INJECTION, SOLUTION SUBCUTANEOUS at 12:07

## 2020-01-01 RX ADMIN — SODIUM CHLORIDE 0.5 MCG/KG/MIN: 900 INJECTION, SOLUTION INTRAVENOUS at 05:23

## 2020-01-01 RX ADMIN — SODIUM CHLORIDE 15 ML/HR: 900 INJECTION, SOLUTION INTRAVENOUS at 04:28

## 2020-01-01 RX ADMIN — BUSPIRONE HYDROCHLORIDE 10 MG: 5 TABLET ORAL at 22:47

## 2020-01-01 RX ADMIN — INSULIN LISPRO 2 UNITS: 100 INJECTION, SOLUTION INTRAVENOUS; SUBCUTANEOUS at 06:18

## 2020-01-01 RX ADMIN — SODIUM CHLORIDE 1000 ML: 900 INJECTION, SOLUTION INTRAVENOUS at 16:03

## 2020-01-01 RX ADMIN — Medication 1 AMPULE: at 09:03

## 2020-01-01 RX ADMIN — MIDAZOLAM HYDROCHLORIDE 4 MG: 1 INJECTION, SOLUTION INTRAMUSCULAR; INTRAVENOUS at 15:45

## 2020-01-01 RX ADMIN — MEROPENEM 500 MG: 500 INJECTION, POWDER, FOR SOLUTION INTRAVENOUS at 15:07

## 2020-01-01 RX ADMIN — ALBUTEROL SULFATE 2.5 MG: 2.5 SOLUTION RESPIRATORY (INHALATION) at 07:44

## 2020-01-01 RX ADMIN — MORPHINE SULFATE 5 MG: 10 INJECTION INTRAVENOUS at 20:49

## 2020-01-01 RX ADMIN — BUSPIRONE HYDROCHLORIDE 10 MG: 10 TABLET ORAL at 09:12

## 2020-01-01 RX ADMIN — FUROSEMIDE 80 MG: 10 INJECTION, SOLUTION INTRAMUSCULAR; INTRAVENOUS at 08:26

## 2020-01-01 RX ADMIN — VANCOMYCIN HYDROCHLORIDE 1000 MG: 1 INJECTION, POWDER, LYOPHILIZED, FOR SOLUTION INTRAVENOUS at 17:19

## 2020-01-01 RX ADMIN — DEXMEDETOMIDINE 0.4 MCG/KG/HR: 100 INJECTION, SOLUTION, CONCENTRATE INTRAVENOUS at 14:12

## 2020-01-01 RX ADMIN — MUPIROCIN: 20 OINTMENT TOPICAL at 08:21

## 2020-01-01 RX ADMIN — MIDODRINE HYDROCHLORIDE 5 MG: 5 TABLET ORAL at 17:29

## 2020-01-01 RX ADMIN — LORATADINE 10 MG: 10 TABLET ORAL at 16:02

## 2020-01-01 RX ADMIN — CEFEPIME HYDROCHLORIDE 2 G: 2 INJECTION, POWDER, FOR SOLUTION INTRAVENOUS at 08:37

## 2020-01-01 RX ADMIN — FUROSEMIDE 40 MG: 10 INJECTION, SOLUTION INTRAMUSCULAR; INTRAVENOUS at 18:48

## 2020-01-01 RX ADMIN — FUROSEMIDE 80 MG: 10 INJECTION, SOLUTION INTRAMUSCULAR; INTRAVENOUS at 13:26

## 2020-01-01 RX ADMIN — Medication 10 ML: at 05:23

## 2020-01-01 RX ADMIN — AZTREONAM 2 G: 2 INJECTION, POWDER, LYOPHILIZED, FOR SOLUTION INTRAMUSCULAR; INTRAVENOUS at 08:30

## 2020-01-01 RX ADMIN — AMIODARONE HYDROCHLORIDE 400 MG: 200 TABLET ORAL at 09:26

## 2020-01-01 RX ADMIN — WARFARIN SODIUM 5 MG: 5 TABLET ORAL at 17:07

## 2020-01-01 RX ADMIN — DEXMEDETOMIDINE 0.7 MCG/KG/HR: 100 INJECTION, SOLUTION, CONCENTRATE INTRAVENOUS at 16:43

## 2020-01-01 RX ADMIN — DEXMEDETOMIDINE 0.7 MCG/KG/HR: 100 INJECTION, SOLUTION, CONCENTRATE INTRAVENOUS at 06:16

## 2020-01-01 RX ADMIN — METRONIDAZOLE 500 MG: 500 INJECTION, SOLUTION INTRAVENOUS at 09:13

## 2020-01-01 RX ADMIN — MIDODRINE HYDROCHLORIDE 10 MG: 5 TABLET ORAL at 22:49

## 2020-01-01 RX ADMIN — METRONIDAZOLE 500 MG: 500 INJECTION, SOLUTION INTRAVENOUS at 21:15

## 2020-01-01 RX ADMIN — MIDODRINE HYDROCHLORIDE 10 MG: 5 TABLET ORAL at 09:37

## 2020-01-01 RX ADMIN — MIDODRINE HYDROCHLORIDE 10 MG: 5 TABLET ORAL at 12:40

## 2020-01-01 RX ADMIN — Medication 10 MG: at 12:06

## 2020-01-01 RX ADMIN — SODIUM CHLORIDE 0.5 MCG/KG/MIN: 900 INJECTION, SOLUTION INTRAVENOUS at 21:51

## 2020-01-01 RX ADMIN — FAMOTIDINE 20 MG: 20 TABLET, FILM COATED ORAL at 08:26

## 2020-01-01 RX ADMIN — SODIUM CHLORIDE 5 UNITS/HR: 900 INJECTION, SOLUTION INTRAVENOUS at 08:32

## 2020-01-01 RX ADMIN — MIDODRINE HYDROCHLORIDE 10 MG: 5 TABLET ORAL at 21:10

## 2020-01-01 RX ADMIN — MIDAZOLAM 2 MG: 1 INJECTION INTRAMUSCULAR; INTRAVENOUS at 12:07

## 2020-01-01 RX ADMIN — INSULIN LISPRO 15 UNITS: 100 INJECTION, SOLUTION INTRAVENOUS; SUBCUTANEOUS at 17:17

## 2020-01-01 RX ADMIN — BUSPIRONE HYDROCHLORIDE 10 MG: 10 TABLET ORAL at 17:49

## 2020-01-01 RX ADMIN — GUAIFENESIN 1200 MG: 600 TABLET ORAL at 08:38

## 2020-01-01 RX ADMIN — INSULIN GLARGINE 5 UNITS: 100 INJECTION, SOLUTION SUBCUTANEOUS at 21:23

## 2020-01-01 RX ADMIN — DEXTROSE 50 % IN WATER (D50W) INTRAVENOUS SYRINGE 12.5 G: at 03:36

## 2020-01-01 RX ADMIN — ALBUTEROL SULFATE 2.5 MG: 2.5 SOLUTION RESPIRATORY (INHALATION) at 13:50

## 2020-01-01 RX ADMIN — Medication 2 SPRAY: at 08:36

## 2020-01-01 RX ADMIN — IPRATROPIUM BROMIDE AND ALBUTEROL SULFATE 3 ML: .5; 3 SOLUTION RESPIRATORY (INHALATION) at 00:29

## 2020-01-01 RX ADMIN — NITROGLYCERIN 1 INCH: 20 OINTMENT TOPICAL at 18:02

## 2020-01-01 RX ADMIN — AMIODARONE HYDROCHLORIDE 1 MG/MIN: 50 INJECTION, SOLUTION INTRAVENOUS at 02:35

## 2020-01-01 RX ADMIN — MIDODRINE HYDROCHLORIDE 10 MG: 5 TABLET ORAL at 08:34

## 2020-01-01 RX ADMIN — PHENYLEPHRINE HYDROCHLORIDE 120 MCG: 10 INJECTION INTRAVENOUS at 07:56

## 2020-01-01 RX ADMIN — FUROSEMIDE 80 MG: 10 INJECTION, SOLUTION INTRAMUSCULAR; INTRAVENOUS at 01:29

## 2020-01-01 RX ADMIN — INSULIN LISPRO 3 UNITS: 100 INJECTION, SOLUTION INTRAVENOUS; SUBCUTANEOUS at 15:58

## 2020-01-01 RX ADMIN — INSULIN HUMAN 8 UNITS: 100 INJECTION, SOLUTION PARENTERAL at 21:36

## 2020-01-01 RX ADMIN — FAMOTIDINE 20 MG: 10 INJECTION INTRAVENOUS at 08:22

## 2020-01-01 RX ADMIN — BUSPIRONE HYDROCHLORIDE 10 MG: 10 TABLET ORAL at 17:29

## 2020-01-01 RX ADMIN — NOREPINEPHRINE BITARTRATE 0.14 MCG/KG/MIN: 1 INJECTION, SOLUTION, CONCENTRATE INTRAVENOUS at 01:00

## 2020-01-01 RX ADMIN — MIDODRINE HYDROCHLORIDE 10 MG: 5 TABLET ORAL at 17:07

## 2020-01-01 RX ADMIN — ALPRAZOLAM 0.25 MG: 0.25 TABLET ORAL at 23:14

## 2020-01-01 RX ADMIN — INSULIN LISPRO 3 UNITS: 100 INJECTION, SOLUTION INTRAVENOUS; SUBCUTANEOUS at 05:37

## 2020-01-01 RX ADMIN — MAGNESIUM SULFATE IN WATER 2 G: 40 INJECTION, SOLUTION INTRAVENOUS at 02:44

## 2020-01-01 RX ADMIN — INSULIN GLARGINE 25 UNITS: 100 INJECTION, SOLUTION SUBCUTANEOUS at 12:26

## 2020-01-01 RX ADMIN — ALPRAZOLAM 0.5 MG: 0.5 TABLET ORAL at 20:51

## 2020-01-01 RX ADMIN — ALBUTEROL SULFATE 2.5 MG: 2.5 SOLUTION RESPIRATORY (INHALATION) at 13:35

## 2020-01-01 RX ADMIN — CHLORHEXIDINE GLUCONATE 10 ML: 1.2 RINSE ORAL at 19:49

## 2020-01-01 RX ADMIN — INSULIN HUMAN 5 UNITS: 100 INJECTION, SOLUTION PARENTERAL at 12:34

## 2020-01-01 RX ADMIN — Medication 10 ML: at 13:03

## 2020-01-01 RX ADMIN — FAMOTIDINE 20 MG: 10 INJECTION INTRAVENOUS at 23:42

## 2020-01-01 RX ADMIN — NOREPINEPHRINE BITARTRATE 0.16 MCG/KG/MIN: 1 INJECTION INTRAVENOUS at 07:05

## 2020-01-01 RX ADMIN — Medication 10 ML: at 03:14

## 2020-01-01 RX ADMIN — ONDANSETRON 4 MG: 2 INJECTION INTRAMUSCULAR; INTRAVENOUS at 21:51

## 2020-01-01 RX ADMIN — ACETAMINOPHEN 650 MG: 325 TABLET, FILM COATED ORAL at 00:33

## 2020-01-01 RX ADMIN — INSULIN GLARGINE 30 UNITS: 100 INJECTION, SOLUTION SUBCUTANEOUS at 08:55

## 2020-01-01 RX ADMIN — HEPARIN SODIUM 1.7 UNITS: 1000 INJECTION INTRAVENOUS; SUBCUTANEOUS at 20:22

## 2020-01-01 RX ADMIN — SODIUM CHLORIDE, SODIUM LACTATE, POTASSIUM CHLORIDE, AND CALCIUM CHLORIDE 75 ML/HR: 600; 310; 30; 20 INJECTION, SOLUTION INTRAVENOUS at 07:36

## 2020-01-01 RX ADMIN — WARFARIN SODIUM 3 MG: 1 TABLET ORAL at 17:20

## 2020-01-01 RX ADMIN — Medication 0.1 MCG/KG/MIN: at 19:16

## 2020-01-01 RX ADMIN — INSULIN LISPRO 6 UNITS: 100 INJECTION, SOLUTION INTRAVENOUS; SUBCUTANEOUS at 21:21

## 2020-01-01 RX ADMIN — BUSPIRONE HYDROCHLORIDE 10 MG: 5 TABLET ORAL at 17:39

## 2020-01-01 RX ADMIN — Medication 1 AMPULE: at 13:20

## 2020-01-01 RX ADMIN — Medication 10 ML: at 13:25

## 2020-01-01 RX ADMIN — FUROSEMIDE 40 MG: 40 TABLET ORAL at 08:27

## 2020-01-01 RX ADMIN — PHENYLEPHRINE HYDROCHLORIDE 120 MCG: 10 INJECTION INTRAVENOUS at 09:59

## 2020-01-01 RX ADMIN — SERTRALINE HYDROCHLORIDE 100 MG: 100 TABLET ORAL at 08:56

## 2020-01-01 RX ADMIN — ALPRAZOLAM 0.5 MG: 0.5 TABLET ORAL at 22:03

## 2020-01-01 RX ADMIN — HEPARIN SODIUM 2000 UNITS: 1000 INJECTION INTRAVENOUS; SUBCUTANEOUS at 13:02

## 2020-01-01 RX ADMIN — Medication: at 12:39

## 2020-01-01 RX ADMIN — ALBUTEROL SULFATE 2.5 MG: 2.5 SOLUTION RESPIRATORY (INHALATION) at 12:19

## 2020-01-01 RX ADMIN — VANCOMYCIN HYDROCHLORIDE 1750 MG: 10 INJECTION, POWDER, LYOPHILIZED, FOR SOLUTION INTRAVENOUS at 08:46

## 2020-01-01 RX ADMIN — WARFARIN SODIUM 7.5 MG: 5 TABLET ORAL at 16:29

## 2020-01-01 RX ADMIN — DEXMEDETOMIDINE 0.7 MCG/KG/HR: 100 INJECTION, SOLUTION, CONCENTRATE INTRAVENOUS at 10:18

## 2020-01-01 RX ADMIN — Medication 10 ML: at 06:22

## 2020-01-01 RX ADMIN — MIRTAZAPINE 15 MG: 15 TABLET, FILM COATED ORAL at 22:19

## 2020-01-01 RX ADMIN — MIDODRINE HYDROCHLORIDE 10 MG: 5 TABLET ORAL at 13:51

## 2020-01-01 RX ADMIN — CHLORHEXIDINE GLUCONATE 10 ML: 1.2 RINSE ORAL at 17:25

## 2020-01-01 RX ADMIN — NOREPINEPHRINE BITARTRATE 0.16 MCG/KG/MIN: 1 INJECTION INTRAVENOUS at 02:03

## 2020-01-01 RX ADMIN — SERTRALINE HYDROCHLORIDE 100 MG: 100 TABLET ORAL at 12:06

## 2020-01-01 RX ADMIN — SODIUM CHLORIDE 4 UNITS/HR: 900 INJECTION, SOLUTION INTRAVENOUS at 06:24

## 2020-01-01 RX ADMIN — DEXMEDETOMIDINE 0.7 MCG/KG/HR: 100 INJECTION, SOLUTION, CONCENTRATE INTRAVENOUS at 22:40

## 2020-01-01 RX ADMIN — SODIUM CHLORIDE SOLN NEBU 3% 4 ML: 3 NEBU SOLN at 08:49

## 2020-01-01 RX ADMIN — Medication 10 ML: at 22:46

## 2020-01-01 RX ADMIN — ALBUTEROL SULFATE 2.5 MG: 2.5 SOLUTION RESPIRATORY (INHALATION) at 01:33

## 2020-01-01 RX ADMIN — CHLORHEXIDINE GLUCONATE 10 ML: 1.2 RINSE ORAL at 17:36

## 2020-01-01 RX ADMIN — DEXTROSE MONOHYDRATE 150 MG: 5 INJECTION, SOLUTION INTRAVENOUS at 21:08

## 2020-01-01 RX ADMIN — OLANZAPINE 5 MG: 5 TABLET, FILM COATED ORAL at 21:03

## 2020-01-01 RX ADMIN — MIDODRINE HYDROCHLORIDE 5 MG: 5 TABLET ORAL at 09:17

## 2020-01-01 RX ADMIN — WARFARIN SODIUM 3 MG: 1 TABLET ORAL at 17:49

## 2020-01-01 RX ADMIN — INSULIN LISPRO 2 UNITS: 100 INJECTION, SOLUTION INTRAVENOUS; SUBCUTANEOUS at 17:19

## 2020-01-01 RX ADMIN — SODIUM CHLORIDE SOLN NEBU 3% 4 ML: 3 NEBU SOLN at 19:24

## 2020-01-01 RX ADMIN — FAMOTIDINE 20 MG: 20 TABLET, FILM COATED ORAL at 09:50

## 2020-01-01 RX ADMIN — CLINDAMYCIN IN 5 PERCENT DEXTROSE 600 MG: 12 INJECTION, SOLUTION INTRAVENOUS at 13:17

## 2020-01-01 RX ADMIN — BUSPIRONE HYDROCHLORIDE 10 MG: 10 TABLET ORAL at 08:59

## 2020-01-01 RX ADMIN — ALBUTEROL SULFATE 2.5 MG: 2.5 SOLUTION RESPIRATORY (INHALATION) at 07:35

## 2020-01-01 RX ADMIN — Medication 10 ML: at 20:24

## 2020-01-01 RX ADMIN — Medication 15 ML: at 23:23

## 2020-01-01 RX ADMIN — MIDODRINE HYDROCHLORIDE 10 MG: 5 TABLET ORAL at 08:11

## 2020-01-01 RX ADMIN — IPRATROPIUM BROMIDE AND ALBUTEROL SULFATE 3 ML: .5; 3 SOLUTION RESPIRATORY (INHALATION) at 15:54

## 2020-01-01 RX ADMIN — INSULIN GLARGINE 18 UNITS: 100 INJECTION, SOLUTION SUBCUTANEOUS at 21:53

## 2020-01-01 RX ADMIN — ALBUTEROL SULFATE 2.5 MG: 2.5 SOLUTION RESPIRATORY (INHALATION) at 14:05

## 2020-01-01 RX ADMIN — Medication 10 ML: at 23:33

## 2020-01-01 RX ADMIN — AMIODARONE HYDROCHLORIDE 0.5 MG/MIN: 50 INJECTION, SOLUTION INTRAVENOUS at 09:22

## 2020-01-01 RX ADMIN — ALBUMIN HUMAN 25 G: 50 SOLUTION INTRAVENOUS at 15:39

## 2020-01-01 RX ADMIN — INSULIN LISPRO 5 UNITS: 100 INJECTION, SOLUTION INTRAVENOUS; SUBCUTANEOUS at 17:03

## 2020-01-01 RX ADMIN — INSULIN GLARGINE 10 UNITS: 100 INJECTION, SOLUTION SUBCUTANEOUS at 09:32

## 2020-01-01 RX ADMIN — DEXAMETHASONE SODIUM PHOSPHATE 4 MG: 4 INJECTION, SOLUTION INTRAMUSCULAR; INTRAVENOUS at 11:12

## 2020-01-01 RX ADMIN — VASOPRESSIN 2 UNITS: 20 INJECTION INTRAVENOUS at 12:46

## 2020-01-01 RX ADMIN — ALBUTEROL SULFATE 2.5 MG: 2.5 SOLUTION RESPIRATORY (INHALATION) at 02:54

## 2020-01-01 RX ADMIN — ALBUTEROL SULFATE 2.5 MG: 2.5 SOLUTION RESPIRATORY (INHALATION) at 13:37

## 2020-01-01 RX ADMIN — EPOETIN ALFA-EPBX 14000 UNITS: 10000 INJECTION, SOLUTION INTRAVENOUS; SUBCUTANEOUS at 11:17

## 2020-01-01 RX ADMIN — BACITRACIN: 500 OINTMENT TOPICAL at 15:55

## 2020-01-01 RX ADMIN — Medication 0.16 MCG/KG/MIN: at 23:22

## 2020-01-01 RX ADMIN — MIDODRINE HYDROCHLORIDE 10 MG: 5 TABLET ORAL at 17:22

## 2020-01-01 RX ADMIN — DIPHENHYDRAMINE HYDROCHLORIDE 25 MG: 25 CAPSULE ORAL at 00:34

## 2020-01-01 RX ADMIN — WARFARIN SODIUM 5 MG: 5 TABLET ORAL at 16:54

## 2020-01-01 RX ADMIN — Medication 10 ML: at 21:11

## 2020-01-01 RX ADMIN — INSULIN LISPRO 12 UNITS: 100 INJECTION, SOLUTION INTRAVENOUS; SUBCUTANEOUS at 22:00

## 2020-01-01 RX ADMIN — ALPRAZOLAM 0.5 MG: 0.5 TABLET ORAL at 21:17

## 2020-01-01 RX ADMIN — BUSPIRONE HYDROCHLORIDE 10 MG: 10 TABLET ORAL at 21:01

## 2020-01-01 RX ADMIN — INSULIN GLARGINE 18 UNITS: 100 INJECTION, SOLUTION SUBCUTANEOUS at 20:45

## 2020-01-01 RX ADMIN — BUSPIRONE HYDROCHLORIDE 10 MG: 5 TABLET ORAL at 08:54

## 2020-01-01 RX ADMIN — MIDODRINE HYDROCHLORIDE 10 MG: 5 TABLET ORAL at 11:21

## 2020-01-01 RX ADMIN — BUPROPION HYDROCHLORIDE 75 MG: 75 TABLET, FILM COATED ORAL at 16:22

## 2020-01-01 RX ADMIN — BUSPIRONE HYDROCHLORIDE 10 MG: 10 TABLET ORAL at 20:37

## 2020-01-01 RX ADMIN — Medication: at 08:36

## 2020-01-01 RX ADMIN — MIDODRINE HYDROCHLORIDE 10 MG: 5 TABLET ORAL at 09:22

## 2020-01-01 RX ADMIN — MIRTAZAPINE 15 MG: 15 TABLET, FILM COATED ORAL at 21:17

## 2020-01-01 RX ADMIN — Medication 10 ML: at 08:18

## 2020-01-01 RX ADMIN — MIDODRINE HYDROCHLORIDE 10 MG: 5 TABLET ORAL at 17:21

## 2020-01-01 RX ADMIN — AMIODARONE HYDROCHLORIDE 0.5 MG/MIN: 50 INJECTION, SOLUTION INTRAVENOUS at 09:02

## 2020-01-01 RX ADMIN — INSULIN GLARGINE 25 UNITS: 100 INJECTION, SOLUTION SUBCUTANEOUS at 08:24

## 2020-01-01 RX ADMIN — FUROSEMIDE 80 MG: 10 INJECTION, SOLUTION INTRAMUSCULAR; INTRAVENOUS at 08:22

## 2020-01-01 RX ADMIN — LEVOFLOXACIN 750 MG: 5 INJECTION, SOLUTION INTRAVENOUS at 20:38

## 2020-01-01 RX ADMIN — TRAZODONE HYDROCHLORIDE 100 MG: 50 TABLET ORAL at 21:56

## 2020-01-01 RX ADMIN — FUROSEMIDE 80 MG: 10 INJECTION, SOLUTION INTRAMUSCULAR; INTRAVENOUS at 15:55

## 2020-01-01 RX ADMIN — TAMSULOSIN HYDROCHLORIDE 0.4 MG: 0.4 CAPSULE ORAL at 21:10

## 2020-01-01 RX ADMIN — WARFARIN SODIUM 5 MG: 5 TABLET ORAL at 18:02

## 2020-01-01 RX ADMIN — SODIUM CHLORIDE 0.25 MCG/KG/MIN: 900 INJECTION, SOLUTION INTRAVENOUS at 00:47

## 2020-01-01 RX ADMIN — INSULIN LISPRO 6 UNITS: 100 INJECTION, SOLUTION INTRAVENOUS; SUBCUTANEOUS at 21:55

## 2020-01-01 RX ADMIN — Medication 10 ML: at 06:11

## 2020-01-01 RX ADMIN — BUSPIRONE HYDROCHLORIDE 10 MG: 10 TABLET ORAL at 21:21

## 2020-01-01 RX ADMIN — ALBUTEROL SULFATE 2.5 MG: 2.5 SOLUTION RESPIRATORY (INHALATION) at 03:44

## 2020-01-01 RX ADMIN — NOREPINEPHRINE BITARTRATE 3 MCG: 1 INJECTION, SOLUTION, CONCENTRATE INTRAVENOUS at 10:44

## 2020-01-01 RX ADMIN — INSULIN LISPRO 4 UNITS: 100 INJECTION, SOLUTION INTRAVENOUS; SUBCUTANEOUS at 17:13

## 2020-01-01 RX ADMIN — BUSPIRONE HYDROCHLORIDE 10 MG: 10 TABLET ORAL at 09:33

## 2020-01-01 RX ADMIN — NITROGLYCERIN 6 INCH: 20 OINTMENT TOPICAL at 23:43

## 2020-01-01 RX ADMIN — BUSPIRONE HYDROCHLORIDE 10 MG: 5 TABLET ORAL at 22:11

## 2020-01-01 RX ADMIN — SODIUM CHLORIDE 125 MG: 900 INJECTION, SOLUTION INTRAVENOUS at 12:26

## 2020-01-01 RX ADMIN — Medication 10 ML: at 13:43

## 2020-01-01 RX ADMIN — IPRATROPIUM BROMIDE AND ALBUTEROL SULFATE 3 ML: .5; 3 SOLUTION RESPIRATORY (INHALATION) at 10:59

## 2020-01-01 RX ADMIN — Medication 10 ML: at 20:03

## 2020-01-01 RX ADMIN — Medication 5 ML: at 06:27

## 2020-01-01 RX ADMIN — ALUMINUM HYDROXIDE, MAGNESIUM HYDROXIDE, AND SIMETHICONE 30 ML: 200; 200; 20 SUSPENSION ORAL at 19:56

## 2020-01-01 RX ADMIN — DIPHENHYDRAMINE HYDROCHLORIDE 25 MG: 25 CAPSULE ORAL at 09:13

## 2020-01-01 RX ADMIN — Medication 1 AMPULE: at 20:23

## 2020-01-01 RX ADMIN — Medication 2 SPRAY: at 09:07

## 2020-01-01 RX ADMIN — INSULIN HUMAN 5 UNITS: 100 INJECTION, SOLUTION PARENTERAL at 17:01

## 2020-01-01 RX ADMIN — INSULIN LISPRO 6 UNITS: 100 INJECTION, SOLUTION INTRAVENOUS; SUBCUTANEOUS at 18:31

## 2020-01-01 RX ADMIN — INSULIN LISPRO 4 UNITS: 100 INJECTION, SOLUTION INTRAVENOUS; SUBCUTANEOUS at 08:37

## 2020-01-01 RX ADMIN — ALBUTEROL SULFATE 2.5 MG: 2.5 SOLUTION RESPIRATORY (INHALATION) at 07:10

## 2020-01-01 RX ADMIN — Medication 10 ML: at 06:12

## 2020-01-01 RX ADMIN — ALBUTEROL SULFATE 2.5 MG: 2.5 SOLUTION RESPIRATORY (INHALATION) at 20:12

## 2020-01-01 RX ADMIN — VANCOMYCIN HYDROCHLORIDE 2000 MG: 10 INJECTION, POWDER, LYOPHILIZED, FOR SOLUTION INTRAVENOUS at 19:15

## 2020-01-01 RX ADMIN — SODIUM CHLORIDE 0.5 MCG/KG/MIN: 900 INJECTION, SOLUTION INTRAVENOUS at 00:23

## 2020-01-01 RX ADMIN — WARFARIN SODIUM 5 MG: 5 TABLET ORAL at 17:47

## 2020-01-01 RX ADMIN — MIDODRINE HYDROCHLORIDE 5 MG: 5 TABLET ORAL at 17:17

## 2020-01-01 RX ADMIN — VASOPRESSIN 1 UNITS: 20 INJECTION INTRAVENOUS at 09:59

## 2020-01-01 RX ADMIN — FUROSEMIDE 40 MG: 40 TABLET ORAL at 08:16

## 2020-01-01 RX ADMIN — SODIUM CHLORIDE 100 ML: 9 INJECTION, SOLUTION INTRAVENOUS at 21:21

## 2020-01-01 RX ADMIN — MIRTAZAPINE 15 MG: 15 TABLET, FILM COATED ORAL at 20:02

## 2020-01-01 RX ADMIN — ALBUTEROL SULFATE 2.5 MG: 2.5 SOLUTION RESPIRATORY (INHALATION) at 07:56

## 2020-01-01 RX ADMIN — AMIODARONE HYDROCHLORIDE 0.5 MG/MIN: 50 INJECTION, SOLUTION INTRAVENOUS at 18:45

## 2020-01-01 RX ADMIN — FUROSEMIDE 40 MG: 10 INJECTION, SOLUTION INTRAMUSCULAR; INTRAVENOUS at 17:00

## 2020-01-01 RX ADMIN — ALBUTEROL SULFATE 2.5 MG: 2.5 SOLUTION RESPIRATORY (INHALATION) at 13:29

## 2020-01-01 RX ADMIN — Medication 1 AMPULE: at 08:09

## 2020-01-01 RX ADMIN — INSULIN GLARGINE 45 UNITS: 100 INJECTION, SOLUTION SUBCUTANEOUS at 21:44

## 2020-01-01 RX ADMIN — FUROSEMIDE 40 MG: 10 INJECTION, SOLUTION INTRAMUSCULAR; INTRAVENOUS at 13:18

## 2020-01-01 RX ADMIN — IPRATROPIUM BROMIDE AND ALBUTEROL SULFATE 3 ML: .5; 3 SOLUTION RESPIRATORY (INHALATION) at 15:01

## 2020-01-01 RX ADMIN — MORPHINE SULFATE 5 MG: 10 INJECTION INTRAVENOUS at 23:52

## 2020-01-01 RX ADMIN — Medication 20 ML: at 22:57

## 2020-01-01 RX ADMIN — MIDODRINE HYDROCHLORIDE 10 MG: 5 TABLET ORAL at 08:16

## 2020-01-01 RX ADMIN — ACETAMINOPHEN 650 MG: 325 TABLET, FILM COATED ORAL at 08:24

## 2020-01-01 RX ADMIN — INSULIN LISPRO 2 UNITS: 100 INJECTION, SOLUTION INTRAVENOUS; SUBCUTANEOUS at 21:30

## 2020-01-01 RX ADMIN — Medication 2 SPRAY: at 16:06

## 2020-01-01 RX ADMIN — Medication 10 ML: at 05:21

## 2020-01-01 RX ADMIN — ALBUTEROL SULFATE 2.5 MG: 2.5 SOLUTION RESPIRATORY (INHALATION) at 08:53

## 2020-01-01 RX ADMIN — LIDOCAINE HYDROCHLORIDE 60 MG: 20 INJECTION, SOLUTION EPIDURAL; INFILTRATION; INTRACAUDAL; PERINEURAL at 12:40

## 2020-01-01 RX ADMIN — ALBUTEROL SULFATE 2.5 MG: 2.5 SOLUTION RESPIRATORY (INHALATION) at 08:49

## 2020-01-01 RX ADMIN — Medication 10 ML: at 13:57

## 2020-01-01 RX ADMIN — MEROPENEM 500 MG: 500 INJECTION, POWDER, FOR SOLUTION INTRAVENOUS at 03:40

## 2020-01-01 RX ADMIN — Medication 1 AMPULE: at 08:37

## 2020-01-01 RX ADMIN — INSULIN LISPRO 2 UNITS: 100 INJECTION, SOLUTION INTRAVENOUS; SUBCUTANEOUS at 06:04

## 2020-01-01 RX ADMIN — TAMSULOSIN HYDROCHLORIDE 0.4 MG: 0.4 CAPSULE ORAL at 08:26

## 2020-01-01 RX ADMIN — Medication 1 AMPULE: at 10:22

## 2020-01-01 RX ADMIN — INSULIN LISPRO 3 UNITS: 100 INJECTION, SOLUTION INTRAVENOUS; SUBCUTANEOUS at 17:35

## 2020-01-01 RX ADMIN — SODIUM CHLORIDE, SODIUM LACTATE, POTASSIUM CHLORIDE, AND CALCIUM CHLORIDE 100 ML/HR: 600; 310; 30; 20 INJECTION, SOLUTION INTRAVENOUS at 12:17

## 2020-01-01 RX ADMIN — INSULIN LISPRO 3 UNITS: 100 INJECTION, SOLUTION INTRAVENOUS; SUBCUTANEOUS at 08:11

## 2020-01-01 RX ADMIN — INSULIN GLARGINE 5 UNITS: 100 INJECTION, SOLUTION SUBCUTANEOUS at 21:16

## 2020-01-01 RX ADMIN — INSULIN LISPRO 3 UNITS: 100 INJECTION, SOLUTION INTRAVENOUS; SUBCUTANEOUS at 06:34

## 2020-01-01 RX ADMIN — INSULIN LISPRO 4 UNITS: 100 INJECTION, SOLUTION INTRAVENOUS; SUBCUTANEOUS at 21:37

## 2020-01-01 RX ADMIN — Medication 10 ML: at 06:20

## 2020-01-01 RX ADMIN — VANCOMYCIN HYDROCHLORIDE 2500 MG: 10 INJECTION, POWDER, LYOPHILIZED, FOR SOLUTION INTRAVENOUS at 09:50

## 2020-01-01 RX ADMIN — CLINDAMYCIN IN 5 PERCENT DEXTROSE 600 MG: 12 INJECTION, SOLUTION INTRAVENOUS at 05:32

## 2020-01-01 RX ADMIN — Medication 40 ML: at 06:22

## 2020-01-01 RX ADMIN — GUAIFENESIN 1200 MG: 600 TABLET ORAL at 21:51

## 2020-01-01 RX ADMIN — ROCURONIUM BROMIDE 45 MG: 10 INJECTION, SOLUTION INTRAVENOUS at 07:55

## 2020-01-01 RX ADMIN — Medication: at 08:24

## 2020-01-01 RX ADMIN — ALBUTEROL SULFATE 2.5 MG: 2.5 SOLUTION RESPIRATORY (INHALATION) at 10:32

## 2020-01-01 RX ADMIN — BUSPIRONE HYDROCHLORIDE 10 MG: 5 TABLET ORAL at 17:36

## 2020-01-01 RX ADMIN — LOPERAMIDE HYDROCHLORIDE 2 MG: 2 CAPSULE ORAL at 17:56

## 2020-01-01 RX ADMIN — INSULIN HUMAN 15 UNITS: 100 INJECTION, SOLUTION PARENTERAL at 12:30

## 2020-01-01 RX ADMIN — SERTRALINE HYDROCHLORIDE 100 MG: 50 TABLET ORAL at 08:26

## 2020-01-01 RX ADMIN — MIRTAZAPINE 15 MG: 15 TABLET, FILM COATED ORAL at 21:10

## 2020-01-01 RX ADMIN — CEFEPIME HYDROCHLORIDE 2 G: 2 INJECTION, POWDER, FOR SOLUTION INTRAVENOUS at 08:47

## 2020-01-01 RX ADMIN — SODIUM CHLORIDE SOLN NEBU 3% 4 ML: 3 NEBU SOLN at 19:54

## 2020-01-01 RX ADMIN — GUAIFENESIN 1200 MG: 600 TABLET ORAL at 20:01

## 2020-01-01 RX ADMIN — NITROGLYCERIN 1 INCH: 20 OINTMENT TOPICAL at 15:53

## 2020-01-01 RX ADMIN — INSULIN LISPRO 4 UNITS: 100 INJECTION, SOLUTION INTRAVENOUS; SUBCUTANEOUS at 21:46

## 2020-01-01 RX ADMIN — CHLORHEXIDINE GLUCONATE 10 ML: 1.2 RINSE ORAL at 17:34

## 2020-01-01 RX ADMIN — BUPROPION HYDROCHLORIDE 75 MG: 75 TABLET, FILM COATED ORAL at 08:57

## 2020-01-01 RX ADMIN — HUMAN INSULIN 6 UNITS: 100 INJECTION, SOLUTION SUBCUTANEOUS at 21:46

## 2020-01-01 RX ADMIN — IPRATROPIUM BROMIDE AND ALBUTEROL SULFATE 3 ML: .5; 3 SOLUTION RESPIRATORY (INHALATION) at 08:25

## 2020-01-01 RX ADMIN — BUPROPION HYDROCHLORIDE 75 MG: 75 TABLET, FILM COATED ORAL at 17:06

## 2020-01-01 RX ADMIN — WARFARIN SODIUM 5 MG: 5 TABLET ORAL at 19:01

## 2020-01-01 RX ADMIN — CHLORHEXIDINE GLUCONATE 10 ML: 1.2 RINSE ORAL at 17:07

## 2020-01-01 RX ADMIN — BUPROPION HYDROCHLORIDE 75 MG: 75 TABLET, FILM COATED ORAL at 08:29

## 2020-01-01 RX ADMIN — MIDODRINE HYDROCHLORIDE 10 MG: 5 TABLET ORAL at 12:38

## 2020-01-01 RX ADMIN — BUSPIRONE HYDROCHLORIDE 10 MG: 10 TABLET ORAL at 08:01

## 2020-01-01 RX ADMIN — FAMOTIDINE 20 MG: 20 TABLET, FILM COATED ORAL at 08:36

## 2020-01-01 RX ADMIN — DEXMEDETOMIDINE 0.45 MCG/KG/HR: 100 INJECTION, SOLUTION, CONCENTRATE INTRAVENOUS at 11:25

## 2020-01-01 RX ADMIN — INSULIN GLARGINE 10 UNITS: 100 INJECTION, SOLUTION SUBCUTANEOUS at 21:41

## 2020-01-01 RX ADMIN — NITROGLYCERIN 1 INCH: 20 OINTMENT TOPICAL at 21:00

## 2020-01-01 RX ADMIN — SODIUM CHLORIDE 0.5 MCG/KG/MIN: 900 INJECTION, SOLUTION INTRAVENOUS at 21:11

## 2020-01-01 RX ADMIN — SODIUM CHLORIDE SOLN NEBU 3% 4 ML: 3 NEBU SOLN at 07:10

## 2020-01-01 RX ADMIN — Medication 10 ML: at 16:22

## 2020-01-01 RX ADMIN — SERTRALINE HYDROCHLORIDE 100 MG: 50 TABLET ORAL at 08:03

## 2020-01-01 RX ADMIN — Medication 1 AMPULE: at 09:25

## 2020-01-01 RX ADMIN — ALBUTEROL SULFATE 2.5 MG: 2.5 SOLUTION RESPIRATORY (INHALATION) at 14:55

## 2020-01-01 RX ADMIN — MEROPENEM 500 MG: 500 INJECTION, POWDER, FOR SOLUTION INTRAVENOUS at 12:12

## 2020-01-01 RX ADMIN — FUROSEMIDE 40 MG: 10 INJECTION INTRAMUSCULAR; INTRAVENOUS at 18:48

## 2020-01-01 RX ADMIN — Medication 2 SPRAY: at 08:25

## 2020-01-01 RX ADMIN — FUROSEMIDE 20 MG/HR: 10 INJECTION, SOLUTION INTRAMUSCULAR; INTRAVENOUS at 05:49

## 2020-01-01 RX ADMIN — INSULIN LISPRO 6 UNITS: 100 INJECTION, SOLUTION INTRAVENOUS; SUBCUTANEOUS at 08:55

## 2020-01-01 RX ADMIN — HUMAN INSULIN 8 UNITS: 100 INJECTION, SOLUTION SUBCUTANEOUS at 21:30

## 2020-01-01 RX ADMIN — MEROPENEM 500 MG: 500 INJECTION, POWDER, FOR SOLUTION INTRAVENOUS at 11:16

## 2020-01-01 RX ADMIN — HEPARIN SODIUM 1600 UNITS: 1000 INJECTION INTRAVENOUS; SUBCUTANEOUS at 16:14

## 2020-01-01 RX ADMIN — AMIODARONE HYDROCHLORIDE 400 MG: 200 TABLET ORAL at 08:44

## 2020-01-01 RX ADMIN — WARFARIN SODIUM 2.5 MG: 2.5 TABLET ORAL at 18:39

## 2020-01-01 RX ADMIN — VASOPRESSIN 0.5 UNITS: 20 INJECTION INTRAVENOUS at 08:52

## 2020-01-01 RX ADMIN — HEPARIN SODIUM 900 UNITS: 1000 INJECTION INTRAVENOUS; SUBCUTANEOUS at 18:42

## 2020-01-01 RX ADMIN — Medication 1 AMPULE: at 20:59

## 2020-01-01 RX ADMIN — EPINEPHRINE 0.02 MCG/KG/MIN: 1 INJECTION PARENTERAL at 20:00

## 2020-01-01 RX ADMIN — INSULIN GLARGINE 40 UNITS: 100 INJECTION, SOLUTION SUBCUTANEOUS at 22:13

## 2020-01-01 RX ADMIN — BUSPIRONE HYDROCHLORIDE 10 MG: 5 TABLET ORAL at 08:51

## 2020-01-01 RX ADMIN — CHLORHEXIDINE GLUCONATE 10 ML: 1.2 RINSE ORAL at 17:27

## 2020-01-01 RX ADMIN — Medication 1 AMPULE: at 09:29

## 2020-01-01 RX ADMIN — INSULIN LISPRO 4 UNITS: 100 INJECTION, SOLUTION INTRAVENOUS; SUBCUTANEOUS at 08:35

## 2020-01-01 RX ADMIN — ALBUTEROL SULFATE 2.5 MG: 2.5 SOLUTION RESPIRATORY (INHALATION) at 21:32

## 2020-01-01 RX ADMIN — PHENYLEPHRINE HYDROCHLORIDE 120 MCG: 10 INJECTION INTRAVENOUS at 08:15

## 2020-01-01 RX ADMIN — SODIUM CHLORIDE 2 UNITS/HR: 900 INJECTION, SOLUTION INTRAVENOUS at 10:15

## 2020-01-01 RX ADMIN — MEROPENEM 500 MG: 500 INJECTION, POWDER, FOR SOLUTION INTRAVENOUS at 20:06

## 2020-01-01 RX ADMIN — TRAZODONE HYDROCHLORIDE 100 MG: 50 TABLET ORAL at 21:44

## 2020-01-01 RX ADMIN — ALPRAZOLAM 0.25 MG: 0.25 TABLET ORAL at 17:07

## 2020-01-01 RX ADMIN — Medication 1 AMPULE: at 08:44

## 2020-01-01 RX ADMIN — BUSPIRONE HYDROCHLORIDE 5 MG: 5 TABLET ORAL at 09:22

## 2020-01-01 RX ADMIN — VANCOMYCIN HYDROCHLORIDE 750 MG: 10 INJECTION, POWDER, LYOPHILIZED, FOR SOLUTION INTRAVENOUS at 23:31

## 2020-01-01 RX ADMIN — INSULIN GLARGINE 5 UNITS: 100 INJECTION, SOLUTION SUBCUTANEOUS at 22:00

## 2020-01-01 RX ADMIN — HEPARIN SODIUM 1200 UNITS: 1000 INJECTION INTRAVENOUS; SUBCUTANEOUS at 03:37

## 2020-01-01 RX ADMIN — ALBUTEROL SULFATE 2.5 MG: 2.5 SOLUTION RESPIRATORY (INHALATION) at 19:33

## 2020-01-01 RX ADMIN — VECURONIUM BROMIDE 10 MG: 1 INJECTION, POWDER, LYOPHILIZED, FOR SOLUTION INTRAVENOUS at 05:15

## 2020-01-01 RX ADMIN — POLYETHYLENE GLYCOL 3350 17 G: 17 POWDER, FOR SOLUTION ORAL at 09:15

## 2020-01-01 RX ADMIN — MIRTAZAPINE 15 MG: 15 TABLET, FILM COATED ORAL at 23:08

## 2020-01-01 RX ADMIN — Medication 2 G: at 20:20

## 2020-01-01 RX ADMIN — MIDODRINE HYDROCHLORIDE 5 MG: 5 TABLET ORAL at 16:36

## 2020-01-01 RX ADMIN — SODIUM CHLORIDE 0.5 MCG/KG/MIN: 900 INJECTION, SOLUTION INTRAVENOUS at 07:31

## 2020-01-01 RX ADMIN — VASOPRESSIN 0.04 UNITS/MIN: 20 INJECTION INTRAVENOUS at 03:04

## 2020-01-01 RX ADMIN — FUROSEMIDE 40 MG: 40 TABLET ORAL at 12:16

## 2020-01-01 RX ADMIN — Medication 10 ML: at 13:13

## 2020-01-01 RX ADMIN — SODIUM CHLORIDE SOLN NEBU 3% 4 ML: 3 NEBU SOLN at 09:21

## 2020-01-01 RX ADMIN — ALBUTEROL SULFATE 2.5 MG: 2.5 SOLUTION RESPIRATORY (INHALATION) at 15:00

## 2020-01-01 RX ADMIN — SODIUM CHLORIDE 100 ML/HR: 900 INJECTION, SOLUTION INTRAVENOUS at 00:47

## 2020-01-01 RX ADMIN — WARFARIN SODIUM 6 MG: 5 TABLET ORAL at 17:36

## 2020-01-01 RX ADMIN — ALBUTEROL SULFATE 2.5 MG: 2.5 SOLUTION RESPIRATORY (INHALATION) at 14:42

## 2020-01-01 RX ADMIN — Medication: at 14:19

## 2020-01-01 RX ADMIN — NITROGLYCERIN 1 INCH: 20 OINTMENT TOPICAL at 20:28

## 2020-01-01 RX ADMIN — INSULIN HUMAN 6 UNITS: 100 INJECTION, SOLUTION PARENTERAL at 16:38

## 2020-01-01 RX ADMIN — INSULIN LISPRO 5 UNITS: 100 INJECTION, SOLUTION INTRAVENOUS; SUBCUTANEOUS at 08:19

## 2020-01-01 RX ADMIN — TAMSULOSIN HYDROCHLORIDE 0.4 MG: 0.4 CAPSULE ORAL at 08:27

## 2020-01-01 RX ADMIN — ALBUTEROL SULFATE 2.5 MG: 2.5 SOLUTION RESPIRATORY (INHALATION) at 20:34

## 2020-01-01 RX ADMIN — Medication 10 ML: at 22:01

## 2020-01-01 RX ADMIN — HUMAN INSULIN 2 UNITS: 100 INJECTION, SOLUTION SUBCUTANEOUS at 22:03

## 2020-01-01 RX ADMIN — AMIODARONE HYDROCHLORIDE 400 MG: 200 TABLET ORAL at 22:16

## 2020-01-01 RX ADMIN — FENTANYL CITRATE 100 MCG: 50 INJECTION INTRAMUSCULAR; INTRAVENOUS at 08:45

## 2020-01-01 RX ADMIN — BUSPIRONE HYDROCHLORIDE 10 MG: 10 TABLET ORAL at 09:10

## 2020-01-01 RX ADMIN — FAMOTIDINE 20 MG: 40 POWDER, FOR SUSPENSION ORAL at 08:26

## 2020-01-01 RX ADMIN — MIDODRINE HYDROCHLORIDE 10 MG: 5 TABLET ORAL at 11:04

## 2020-01-01 RX ADMIN — NITROGLYCERIN 1 INCH: 20 OINTMENT TOPICAL at 17:19

## 2020-01-01 RX ADMIN — MEROPENEM 500 MG: 500 INJECTION, POWDER, FOR SOLUTION INTRAVENOUS at 12:49

## 2020-01-01 RX ADMIN — INSULIN LISPRO 9 UNITS: 100 INJECTION, SOLUTION INTRAVENOUS; SUBCUTANEOUS at 21:17

## 2020-01-01 RX ADMIN — OLANZAPINE 5 MG: 5 TABLET, FILM COATED ORAL at 21:37

## 2020-01-01 RX ADMIN — CEFEPIME HYDROCHLORIDE 2 G: 2 INJECTION, POWDER, FOR SOLUTION INTRAVENOUS at 21:51

## 2020-01-01 RX ADMIN — Medication 10 ML: at 13:56

## 2020-01-01 RX ADMIN — Medication 10 ML: at 13:21

## 2020-01-01 RX ADMIN — POLYETHYLENE GLYCOL 3350 17 G: 17 POWDER, FOR SOLUTION ORAL at 12:21

## 2020-01-01 RX ADMIN — MIRTAZAPINE 15 MG: 30 TABLET, FILM COATED ORAL at 21:03

## 2020-01-01 RX ADMIN — AMIODARONE HYDROCHLORIDE 0.5 MG/MIN: 50 INJECTION, SOLUTION INTRAVENOUS at 04:27

## 2020-01-01 RX ADMIN — INSULIN GLARGINE 5 UNITS: 100 INJECTION, SOLUTION SUBCUTANEOUS at 21:30

## 2020-01-01 RX ADMIN — SODIUM CHLORIDE 0.5 MCG/KG/MIN: 900 INJECTION, SOLUTION INTRAVENOUS at 20:52

## 2020-01-01 RX ADMIN — ALBUTEROL SULFATE 2.5 MG: 2.5 SOLUTION RESPIRATORY (INHALATION) at 20:55

## 2020-01-01 RX ADMIN — POLYETHYLENE GLYCOL 3350 17 G: 17 POWDER, FOR SOLUTION ORAL at 08:18

## 2020-01-01 RX ADMIN — INSULIN GLARGINE 45 UNITS: 100 INJECTION, SOLUTION SUBCUTANEOUS at 21:17

## 2020-01-01 RX ADMIN — POLYETHYLENE GLYCOL 3350 17 G: 17 POWDER, FOR SOLUTION ORAL at 08:57

## 2020-01-01 RX ADMIN — Medication 10 ML: at 14:00

## 2020-01-01 RX ADMIN — HUMAN INSULIN 4 UNITS: 100 INJECTION, SOLUTION SUBCUTANEOUS at 21:53

## 2020-01-01 RX ADMIN — ALBUTEROL SULFATE 2.5 MG: 2.5 SOLUTION RESPIRATORY (INHALATION) at 20:25

## 2020-01-01 RX ADMIN — Medication: at 09:08

## 2020-01-01 RX ADMIN — ALBUTEROL SULFATE 2.5 MG: 2.5 SOLUTION RESPIRATORY (INHALATION) at 21:47

## 2020-01-01 RX ADMIN — VASOPRESSIN 0.03 UNITS/MIN: 20 INJECTION INTRAVENOUS at 16:25

## 2020-01-01 RX ADMIN — WARFARIN SODIUM 2 MG: 1 TABLET ORAL at 17:56

## 2020-01-01 RX ADMIN — ALBUTEROL SULFATE 2.5 MG: 2.5 SOLUTION RESPIRATORY (INHALATION) at 13:22

## 2020-01-01 RX ADMIN — Medication 0.15 MCG/KG/MIN: at 09:50

## 2020-01-01 RX ADMIN — DEXMEDETOMIDINE 0.7 MCG/KG/HR: 100 INJECTION, SOLUTION, CONCENTRATE INTRAVENOUS at 04:55

## 2020-01-01 RX ADMIN — SODIUM CHLORIDE SOLN NEBU 3% 4 ML: 3 NEBU SOLN at 07:18

## 2020-01-01 RX ADMIN — PHENYLEPHRINE HYDROCHLORIDE 120 MCG: 10 INJECTION INTRAVENOUS at 09:50

## 2020-01-01 RX ADMIN — BACITRACIN: 500 OINTMENT TOPICAL at 17:32

## 2020-01-01 RX ADMIN — BUSPIRONE HYDROCHLORIDE 10 MG: 5 TABLET ORAL at 09:15

## 2020-01-01 RX ADMIN — ATORVASTATIN CALCIUM 80 MG: 40 TABLET, FILM COATED ORAL at 20:59

## 2020-01-01 RX ADMIN — TRAZODONE HYDROCHLORIDE 100 MG: 50 TABLET ORAL at 21:19

## 2020-01-01 RX ADMIN — MORPHINE SULFATE 5 MG: 10 INJECTION INTRAVENOUS at 18:00

## 2020-01-01 RX ADMIN — MIRTAZAPINE 15 MG: 15 TABLET, FILM COATED ORAL at 23:27

## 2020-01-01 RX ADMIN — Medication 10 ML: at 06:19

## 2020-01-01 RX ADMIN — Medication 1 AMPULE: at 20:26

## 2020-01-01 RX ADMIN — HEPARIN SODIUM 1100 UNITS: 1000 INJECTION INTRAVENOUS; SUBCUTANEOUS at 23:15

## 2020-01-01 RX ADMIN — Medication 15 ML: at 05:37

## 2020-01-01 RX ADMIN — PHENYLEPHRINE HYDROCHLORIDE 120 MCG: 10 INJECTION INTRAVENOUS at 08:52

## 2020-01-01 RX ADMIN — DEXMEDETOMIDINE 0.6 MCG/KG/HR: 100 INJECTION, SOLUTION, CONCENTRATE INTRAVENOUS at 09:37

## 2020-01-01 RX ADMIN — MIDAZOLAM 1 MG: 1 INJECTION INTRAMUSCULAR; INTRAVENOUS at 03:44

## 2020-01-01 RX ADMIN — BUPROPION HYDROCHLORIDE 75 MG: 75 TABLET, FILM COATED ORAL at 12:06

## 2020-01-01 RX ADMIN — GUAIFENESIN 1200 MG: 600 TABLET ORAL at 08:48

## 2020-01-01 RX ADMIN — MORPHINE SULFATE 4 MG: 10 INJECTION INTRAVENOUS at 11:26

## 2020-01-01 RX ADMIN — POLYETHYLENE GLYCOL 3350 17 G: 17 POWDER, FOR SOLUTION ORAL at 09:50

## 2020-01-01 RX ADMIN — LIDOCAINE HYDROCHLORIDE: 40 SOLUTION TOPICAL at 12:13

## 2020-01-01 RX ADMIN — ALPRAZOLAM 0.5 MG: 0.5 TABLET ORAL at 10:08

## 2020-01-01 RX ADMIN — INSULIN LISPRO 6 UNITS: 100 INJECTION, SOLUTION INTRAVENOUS; SUBCUTANEOUS at 16:04

## 2020-01-01 RX ADMIN — Medication 10 ML: at 05:53

## 2020-01-01 RX ADMIN — DEXTROSE MONOHYDRATE AND SODIUM CHLORIDE 25 ML/HR: 5; .45 INJECTION, SOLUTION INTRAVENOUS at 07:04

## 2020-01-01 RX ADMIN — INSULIN LISPRO 6 UNITS: 100 INJECTION, SOLUTION INTRAVENOUS; SUBCUTANEOUS at 17:18

## 2020-01-01 RX ADMIN — INSULIN HUMAN 5 UNITS: 100 INJECTION, SOLUTION PARENTERAL at 06:33

## 2020-01-01 RX ADMIN — SODIUM CHLORIDE 25 ML/HR: 900 INJECTION, SOLUTION INTRAVENOUS at 04:06

## 2020-01-01 RX ADMIN — INSULIN LISPRO 12 UNITS: 100 INJECTION, SOLUTION INTRAVENOUS; SUBCUTANEOUS at 21:05

## 2020-01-01 RX ADMIN — TAMSULOSIN HYDROCHLORIDE 0.4 MG: 0.4 CAPSULE ORAL at 21:23

## 2020-01-01 RX ADMIN — AMIODARONE HYDROCHLORIDE 400 MG: 200 TABLET ORAL at 20:28

## 2020-01-01 RX ADMIN — SODIUM CHLORIDE 0.5 MCG/KG/MIN: 900 INJECTION, SOLUTION INTRAVENOUS at 14:30

## 2020-01-01 RX ADMIN — SODIUM CHLORIDE 0.5 MCG/KG/MIN: 900 INJECTION, SOLUTION INTRAVENOUS at 01:15

## 2020-01-01 RX ADMIN — ALBUTEROL SULFATE 2.5 MG: 2.5 SOLUTION RESPIRATORY (INHALATION) at 14:59

## 2020-01-01 RX ADMIN — DAKIN'S SOLUTION 0.125% (QUARTER STRENGTH): 0.12 SOLUTION at 08:40

## 2020-01-01 RX ADMIN — SODIUM CHLORIDE SOLN NEBU 3% 4 ML: 3 NEBU SOLN at 19:23

## 2020-01-01 RX ADMIN — MIDODRINE HYDROCHLORIDE 10 MG: 5 TABLET ORAL at 16:20

## 2020-01-01 RX ADMIN — FAMOTIDINE 20 MG: 20 TABLET, FILM COATED ORAL at 09:33

## 2020-01-01 RX ADMIN — BUSPIRONE HYDROCHLORIDE 10 MG: 5 TABLET ORAL at 16:28

## 2020-01-01 RX ADMIN — ATORVASTATIN CALCIUM 80 MG: 40 TABLET, FILM COATED ORAL at 21:29

## 2020-01-01 RX ADMIN — AMIODARONE HYDROCHLORIDE 400 MG: 200 TABLET ORAL at 21:15

## 2020-01-01 RX ADMIN — INSULIN LISPRO 4 UNITS: 100 INJECTION, SOLUTION INTRAVENOUS; SUBCUTANEOUS at 17:10

## 2020-01-01 RX ADMIN — MIRTAZAPINE 15 MG: 15 TABLET, FILM COATED ORAL at 22:03

## 2020-01-01 RX ADMIN — LEVOFLOXACIN 500 MG: 500 TABLET, FILM COATED ORAL at 21:00

## 2020-01-01 RX ADMIN — FUROSEMIDE 40 MG: 10 INJECTION, SOLUTION INTRAMUSCULAR; INTRAVENOUS at 17:03

## 2020-01-01 RX ADMIN — VASOPRESSIN 0.08 UNITS/MIN: 20 INJECTION INTRAVENOUS at 06:31

## 2020-01-01 RX ADMIN — ALBUTEROL SULFATE 2.5 MG: 2.5 SOLUTION RESPIRATORY (INHALATION) at 13:43

## 2020-01-01 RX ADMIN — Medication 2 SPRAY: at 17:39

## 2020-01-01 RX ADMIN — INSULIN LISPRO 2 UNITS: 100 INJECTION, SOLUTION INTRAVENOUS; SUBCUTANEOUS at 12:57

## 2020-01-01 RX ADMIN — BUSPIRONE HYDROCHLORIDE 10 MG: 5 TABLET ORAL at 15:40

## 2020-01-01 RX ADMIN — MIDAZOLAM 1 MG: 1 INJECTION INTRAMUSCULAR; INTRAVENOUS at 08:05

## 2020-01-01 RX ADMIN — ALBUTEROL SULFATE 2.5 MG: 2.5 SOLUTION RESPIRATORY (INHALATION) at 15:22

## 2020-01-01 RX ADMIN — ALBUTEROL SULFATE 2.5 MG: 2.5 SOLUTION RESPIRATORY (INHALATION) at 07:28

## 2020-01-01 RX ADMIN — Medication 10 ML: at 22:13

## 2020-01-01 RX ADMIN — ALBUTEROL SULFATE 2.5 MG: 2.5 SOLUTION RESPIRATORY (INHALATION) at 15:38

## 2020-01-01 RX ADMIN — NOREPINEPHRINE BITARTRATE 0.14 MCG/KG/MIN: 1 INJECTION, SOLUTION, CONCENTRATE INTRAVENOUS at 05:01

## 2020-01-01 RX ADMIN — NITROGLYCERIN 1 INCH: 20 OINTMENT TOPICAL at 09:50

## 2020-01-01 RX ADMIN — ALBUTEROL SULFATE 2.5 MG: 2.5 SOLUTION RESPIRATORY (INHALATION) at 13:38

## 2020-01-01 RX ADMIN — BUPROPION HYDROCHLORIDE 75 MG: 75 TABLET, FILM COATED ORAL at 08:55

## 2020-01-01 RX ADMIN — IPRATROPIUM BROMIDE AND ALBUTEROL SULFATE 3 ML: .5; 3 SOLUTION RESPIRATORY (INHALATION) at 08:13

## 2020-01-01 RX ADMIN — INSULIN HUMAN 2 UNITS: 100 INJECTION, SOLUTION PARENTERAL at 07:38

## 2020-01-01 RX ADMIN — FUROSEMIDE 80 MG: 10 INJECTION, SOLUTION INTRAMUSCULAR; INTRAVENOUS at 12:20

## 2020-01-01 RX ADMIN — INSULIN HUMAN 5 UNITS: 100 INJECTION, SOLUTION PARENTERAL at 21:36

## 2020-01-01 RX ADMIN — INSULIN LISPRO 8 UNITS: 100 INJECTION, SOLUTION INTRAVENOUS; SUBCUTANEOUS at 09:15

## 2020-01-01 RX ADMIN — ALBUTEROL SULFATE 2.5 MG: 2.5 SOLUTION RESPIRATORY (INHALATION) at 14:47

## 2020-01-01 RX ADMIN — OXYCODONE HYDROCHLORIDE 10 MG: 5 TABLET ORAL at 13:45

## 2020-01-01 RX ADMIN — CEFAZOLIN 3 G: 1 INJECTION, POWDER, FOR SOLUTION INTRAVENOUS at 08:38

## 2020-01-01 RX ADMIN — HEPARIN SODIUM 1000 UNITS: 1000 INJECTION INTRAVENOUS; SUBCUTANEOUS at 12:04

## 2020-01-01 RX ADMIN — BACITRACIN: 500 OINTMENT TOPICAL at 17:25

## 2020-01-01 RX ADMIN — Medication 10 ML: at 05:37

## 2020-01-01 RX ADMIN — ALBUTEROL SULFATE 2.5 MG: 2.5 SOLUTION RESPIRATORY (INHALATION) at 02:04

## 2020-01-01 RX ADMIN — METRONIDAZOLE 500 MG: 500 INJECTION, SOLUTION INTRAVENOUS at 21:39

## 2020-01-01 RX ADMIN — HUMAN INSULIN 4 UNITS: 100 INJECTION, SOLUTION SUBCUTANEOUS at 22:18

## 2020-01-01 RX ADMIN — IPRATROPIUM BROMIDE AND ALBUTEROL SULFATE 3 ML: .5; 3 SOLUTION RESPIRATORY (INHALATION) at 12:33

## 2020-01-01 RX ADMIN — ALPRAZOLAM 0.5 MG: 0.5 TABLET ORAL at 10:45

## 2020-01-01 RX ADMIN — ALBUTEROL SULFATE 2.5 MG: 2.5 SOLUTION RESPIRATORY (INHALATION) at 01:28

## 2020-01-01 RX ADMIN — INSULIN LISPRO 4 UNITS: 100 INJECTION, SOLUTION INTRAVENOUS; SUBCUTANEOUS at 16:29

## 2020-01-01 RX ADMIN — AMIODARONE HYDROCHLORIDE 200 MG: 200 TABLET ORAL at 20:49

## 2020-01-01 RX ADMIN — CLINDAMYCIN IN 5 PERCENT DEXTROSE 600 MG: 12 INJECTION, SOLUTION INTRAVENOUS at 12:25

## 2020-01-01 RX ADMIN — Medication 10 ML: at 21:12

## 2020-01-01 RX ADMIN — PHENYLEPHRINE HYDROCHLORIDE 120 MCG: 10 INJECTION INTRAVENOUS at 10:09

## 2020-01-01 RX ADMIN — MIDODRINE HYDROCHLORIDE 5 MG: 5 TABLET ORAL at 12:11

## 2020-01-01 RX ADMIN — MIDODRINE HYDROCHLORIDE 5 MG: 5 TABLET ORAL at 12:36

## 2020-01-01 RX ADMIN — Medication 10 ML: at 13:33

## 2020-01-01 RX ADMIN — DEXTROSE MONOHYDRATE 7 MCG/MIN: 5 INJECTION, SOLUTION INTRAVENOUS at 10:26

## 2020-01-01 RX ADMIN — SODIUM CHLORIDE SOLN NEBU 3% 4 ML: 3 NEBU SOLN at 07:43

## 2020-01-01 RX ADMIN — SERTRALINE HYDROCHLORIDE 100 MG: 100 TABLET ORAL at 12:15

## 2020-01-01 RX ADMIN — Medication 5 ML: at 21:42

## 2020-01-01 RX ADMIN — ALBUTEROL SULFATE 2.5 MG: 2.5 SOLUTION RESPIRATORY (INHALATION) at 01:38

## 2020-01-01 RX ADMIN — BUPROPION HYDROCHLORIDE 75 MG: 75 TABLET, FILM COATED ORAL at 17:36

## 2020-01-01 RX ADMIN — BUSPIRONE HYDROCHLORIDE 10 MG: 10 TABLET ORAL at 21:14

## 2020-01-01 RX ADMIN — SODIUM CHLORIDE SOLN NEBU 3% 4 ML: 3 NEBU SOLN at 21:22

## 2020-01-01 RX ADMIN — WARFARIN SODIUM 6 MG: 5 TABLET ORAL at 18:22

## 2020-01-01 RX ADMIN — BUSPIRONE HYDROCHLORIDE 5 MG: 5 TABLET ORAL at 17:29

## 2020-01-01 RX ADMIN — ALBUTEROL SULFATE 2.5 MG: 2.5 SOLUTION RESPIRATORY (INHALATION) at 19:25

## 2020-01-01 RX ADMIN — MIDODRINE HYDROCHLORIDE 10 MG: 5 TABLET ORAL at 17:24

## 2020-01-01 RX ADMIN — INSULIN GLARGINE 25 UNITS: 100 INJECTION, SOLUTION SUBCUTANEOUS at 21:17

## 2020-01-01 RX ADMIN — LORATADINE 10 MG: 10 TABLET ORAL at 08:23

## 2020-01-01 RX ADMIN — BACITRACIN: 500 OINTMENT TOPICAL at 09:00

## 2020-01-01 RX ADMIN — SODIUM CHLORIDE SOLN NEBU 3% 4 ML: 3 NEBU SOLN at 07:51

## 2020-01-01 RX ADMIN — LEVOFLOXACIN 500 MG: 5 INJECTION, SOLUTION INTRAVENOUS at 21:21

## 2020-01-01 RX ADMIN — Medication 10 ML: at 06:21

## 2020-01-01 RX ADMIN — AMIODARONE HYDROCHLORIDE 400 MG: 200 TABLET ORAL at 09:10

## 2020-01-01 RX ADMIN — SODIUM CHLORIDE SOLN NEBU 3% 4 ML: 3 NEBU SOLN at 08:32

## 2020-01-01 RX ADMIN — MEROPENEM 500 MG: 500 INJECTION, POWDER, FOR SOLUTION INTRAVENOUS at 03:45

## 2020-01-01 RX ADMIN — SODIUM CHLORIDE 0.5 MCG/KG/MIN: 900 INJECTION, SOLUTION INTRAVENOUS at 09:23

## 2020-01-01 RX ADMIN — MIDODRINE HYDROCHLORIDE 10 MG: 5 TABLET ORAL at 08:47

## 2020-01-01 RX ADMIN — PHENYLEPHRINE HYDROCHLORIDE 120 MCG: 10 INJECTION INTRAVENOUS at 09:46

## 2020-01-01 RX ADMIN — Medication 5 MG: at 08:11

## 2020-01-01 RX ADMIN — IPRATROPIUM BROMIDE AND ALBUTEROL SULFATE 3 ML: .5; 3 SOLUTION RESPIRATORY (INHALATION) at 15:34

## 2020-01-01 RX ADMIN — FUROSEMIDE 80 MG: 10 INJECTION, SOLUTION INTRAMUSCULAR; INTRAVENOUS at 00:28

## 2020-01-01 RX ADMIN — SODIUM CHLORIDE 5 UNITS/HR: 900 INJECTION, SOLUTION INTRAVENOUS at 05:45

## 2020-01-01 RX ADMIN — NITROGLYCERIN 1 INCH: 20 OINTMENT TOPICAL at 21:03

## 2020-01-01 RX ADMIN — Medication 10 ML: at 23:01

## 2020-01-01 RX ADMIN — Medication 5 ML: at 22:00

## 2020-01-01 RX ADMIN — MIDAZOLAM 2 MG: 1 INJECTION INTRAMUSCULAR; INTRAVENOUS at 07:17

## 2020-01-01 RX ADMIN — ATORVASTATIN CALCIUM 80 MG: 40 TABLET, FILM COATED ORAL at 20:02

## 2020-01-01 RX ADMIN — Medication 10 MG: at 11:33

## 2020-01-01 RX ADMIN — MIDODRINE HYDROCHLORIDE 10 MG: 5 TABLET ORAL at 08:57

## 2020-01-01 RX ADMIN — TAMSULOSIN HYDROCHLORIDE 0.4 MG: 0.4 CAPSULE ORAL at 08:55

## 2020-01-01 RX ADMIN — ALBUTEROL SULFATE 2.5 MG: 2.5 SOLUTION RESPIRATORY (INHALATION) at 01:54

## 2020-01-01 RX ADMIN — Medication 5 MG: at 07:54

## 2020-01-01 RX ADMIN — ALPRAZOLAM 0.5 MG: 0.5 TABLET ORAL at 21:23

## 2020-01-01 RX ADMIN — SODIUM CHLORIDE, SODIUM LACTATE, POTASSIUM CHLORIDE, AND CALCIUM CHLORIDE 100 ML/HR: 600; 310; 30; 20 INJECTION, SOLUTION INTRAVENOUS at 10:34

## 2020-01-01 RX ADMIN — WARFARIN SODIUM 5 MG: 5 TABLET ORAL at 17:03

## 2020-01-01 RX ADMIN — FUROSEMIDE 80 MG: 10 INJECTION, SOLUTION INTRAMUSCULAR; INTRAVENOUS at 15:59

## 2020-01-01 RX ADMIN — Medication 2 SPRAY: at 17:45

## 2020-01-01 RX ADMIN — Medication 10 ML: at 05:00

## 2020-01-01 RX ADMIN — METRONIDAZOLE 500 MG: 500 INJECTION, SOLUTION INTRAVENOUS at 21:32

## 2020-01-01 RX ADMIN — VANCOMYCIN HYDROCHLORIDE 2000 MG: 10 INJECTION, POWDER, LYOPHILIZED, FOR SOLUTION INTRAVENOUS at 18:14

## 2020-01-01 RX ADMIN — BUPROPION HYDROCHLORIDE 75 MG: 75 TABLET, FILM COATED ORAL at 08:23

## 2020-01-01 RX ADMIN — DAKIN'S SOLUTION 0.125% (QUARTER STRENGTH): 0.12 SOLUTION at 12:59

## 2020-01-01 RX ADMIN — VANCOMYCIN HYDROCHLORIDE 500 MG: 1 INJECTION, POWDER, LYOPHILIZED, FOR SOLUTION INTRAVENOUS at 22:52

## 2020-01-01 RX ADMIN — Medication 10 ML: at 05:33

## 2020-01-01 RX ADMIN — INSULIN GLARGINE 16 UNITS: 100 INJECTION, SOLUTION SUBCUTANEOUS at 21:00

## 2020-01-01 RX ADMIN — IPRATROPIUM BROMIDE AND ALBUTEROL SULFATE 3 ML: .5; 3 SOLUTION RESPIRATORY (INHALATION) at 03:44

## 2020-01-01 RX ADMIN — ALBUTEROL SULFATE 2.5 MG: 2.5 SOLUTION RESPIRATORY (INHALATION) at 09:21

## 2020-01-01 RX ADMIN — PHENYLEPHRINE HYDROCHLORIDE 120 MCG: 10 INJECTION INTRAVENOUS at 07:44

## 2020-01-01 RX ADMIN — ATORVASTATIN CALCIUM 40 MG: 40 TABLET, FILM COATED ORAL at 21:53

## 2020-01-01 RX ADMIN — ALBUTEROL SULFATE 2.5 MG: 2.5 SOLUTION RESPIRATORY (INHALATION) at 14:21

## 2020-01-01 RX ADMIN — SERTRALINE HYDROCHLORIDE 25 MG: 50 TABLET ORAL at 08:37

## 2020-01-01 RX ADMIN — CEFEPIME HYDROCHLORIDE 2 G: 2 INJECTION, POWDER, FOR SOLUTION INTRAVENOUS at 21:35

## 2020-01-01 RX ADMIN — INSULIN LISPRO 6 UNITS: 100 INJECTION, SOLUTION INTRAVENOUS; SUBCUTANEOUS at 05:48

## 2020-01-01 RX ADMIN — MIDODRINE HYDROCHLORIDE 10 MG: 5 TABLET ORAL at 12:15

## 2020-01-01 RX ADMIN — INSULIN LISPRO 6 UNITS: 100 INJECTION, SOLUTION INTRAVENOUS; SUBCUTANEOUS at 16:47

## 2020-01-01 RX ADMIN — VANCOMYCIN HYDROCHLORIDE 2000 MG: 10 INJECTION, POWDER, LYOPHILIZED, FOR SOLUTION INTRAVENOUS at 14:44

## 2020-01-01 RX ADMIN — Medication 5 ML: at 22:57

## 2020-01-01 RX ADMIN — ACETAMINOPHEN 650 MG: 325 TABLET, FILM COATED ORAL at 11:48

## 2020-01-01 RX ADMIN — INSULIN HUMAN 15 UNITS: 100 INJECTION, SOLUTION PARENTERAL at 17:29

## 2020-01-01 RX ADMIN — INSULIN GLARGINE 28 UNITS: 100 INJECTION, SOLUTION SUBCUTANEOUS at 08:18

## 2020-01-01 RX ADMIN — LORATADINE 10 MG: 10 TABLET ORAL at 08:56

## 2020-01-01 RX ADMIN — INSULIN LISPRO 6 UNITS: 100 INJECTION, SOLUTION INTRAVENOUS; SUBCUTANEOUS at 21:57

## 2020-01-01 RX ADMIN — MIDODRINE HYDROCHLORIDE 10 MG: 5 TABLET ORAL at 11:15

## 2020-01-01 RX ADMIN — INSULIN LISPRO 2 UNITS: 100 INJECTION, SOLUTION INTRAVENOUS; SUBCUTANEOUS at 09:09

## 2020-01-01 RX ADMIN — TAMSULOSIN HYDROCHLORIDE 0.4 MG: 0.4 CAPSULE ORAL at 09:16

## 2020-01-01 RX ADMIN — AZTREONAM 2 G: 2 INJECTION, POWDER, LYOPHILIZED, FOR SOLUTION INTRAMUSCULAR; INTRAVENOUS at 20:18

## 2020-01-01 RX ADMIN — CLINDAMYCIN IN 5 PERCENT DEXTROSE 600 MG: 12 INJECTION, SOLUTION INTRAVENOUS at 05:08

## 2020-01-01 RX ADMIN — BUSPIRONE HYDROCHLORIDE 10 MG: 10 TABLET ORAL at 16:58

## 2020-01-01 RX ADMIN — METRONIDAZOLE 500 MG: 500 INJECTION, SOLUTION INTRAVENOUS at 09:55

## 2020-01-01 RX ADMIN — VANCOMYCIN HYDROCHLORIDE 1500 MG: 10 INJECTION, POWDER, LYOPHILIZED, FOR SOLUTION INTRAVENOUS at 10:20

## 2020-01-01 RX ADMIN — SODIUM CHLORIDE 125 MG: 900 INJECTION, SOLUTION INTRAVENOUS at 08:12

## 2020-01-01 RX ADMIN — INSULIN LISPRO 5 UNITS: 100 INJECTION, SOLUTION INTRAVENOUS; SUBCUTANEOUS at 15:57

## 2020-01-01 RX ADMIN — SODIUM CHLORIDE SOLN NEBU 3% 4 ML: 3 NEBU SOLN at 19:20

## 2020-01-01 RX ADMIN — INSULIN LISPRO 2 UNITS: 100 INJECTION, SOLUTION INTRAVENOUS; SUBCUTANEOUS at 08:30

## 2020-01-01 RX ADMIN — ALBUTEROL SULFATE 2.5 MG: 2.5 SOLUTION RESPIRATORY (INHALATION) at 07:20

## 2020-01-01 RX ADMIN — IPRATROPIUM BROMIDE AND ALBUTEROL SULFATE 3 ML: .5; 3 SOLUTION RESPIRATORY (INHALATION) at 12:15

## 2020-01-01 RX ADMIN — ALBUTEROL SULFATE 2.5 MG: 2.5 SOLUTION RESPIRATORY (INHALATION) at 07:59

## 2020-01-01 RX ADMIN — GUAIFENESIN 1200 MG: 600 TABLET ORAL at 21:05

## 2020-01-01 RX ADMIN — ALBUTEROL SULFATE 2.5 MG: 2.5 SOLUTION RESPIRATORY (INHALATION) at 14:53

## 2020-01-01 RX ADMIN — GUAIFENESIN 1200 MG: 600 TABLET ORAL at 20:15

## 2020-01-01 RX ADMIN — MIDODRINE HYDROCHLORIDE 10 MG: 5 TABLET ORAL at 17:14

## 2020-01-01 RX ADMIN — HUMAN INSULIN 4 UNITS: 100 INJECTION, SOLUTION SUBCUTANEOUS at 12:26

## 2020-01-01 RX ADMIN — DIPHENHYDRAMINE HYDROCHLORIDE 25 MG: 25 CAPSULE ORAL at 11:19

## 2020-01-01 RX ADMIN — INSULIN LISPRO 9 UNITS: 100 INJECTION, SOLUTION INTRAVENOUS; SUBCUTANEOUS at 12:07

## 2020-01-01 RX ADMIN — DOPAMINE HYDROCHLORIDE 3 MCG/KG/MIN: 320 INJECTION, SOLUTION INTRAVENOUS at 15:13

## 2020-01-01 RX ADMIN — SODIUM CHLORIDE SOLN NEBU 3% 4 ML: 3 NEBU SOLN at 21:16

## 2020-01-01 RX ADMIN — MIRTAZAPINE 15 MG: 15 TABLET, FILM COATED ORAL at 21:05

## 2020-01-01 RX ADMIN — MIDODRINE HYDROCHLORIDE 10 MG: 5 TABLET ORAL at 16:57

## 2020-01-01 RX ADMIN — ALBUTEROL SULFATE 2.5 MG: 2.5 SOLUTION RESPIRATORY (INHALATION) at 14:57

## 2020-01-01 RX ADMIN — Medication: at 08:38

## 2020-01-01 RX ADMIN — SODIUM BICARBONATE 50 MEQ: 84 INJECTION, SOLUTION INTRAVENOUS at 01:40

## 2020-01-01 RX ADMIN — MIDODRINE HYDROCHLORIDE 10 MG: 5 TABLET ORAL at 12:16

## 2020-01-01 RX ADMIN — SODIUM CHLORIDE SOLN NEBU 3% 4 ML: 3 NEBU SOLN at 20:33

## 2020-01-01 RX ADMIN — SODIUM CHLORIDE 25 ML/HR: 900 INJECTION, SOLUTION INTRAVENOUS at 07:28

## 2020-01-01 RX ADMIN — SODIUM CHLORIDE 6 UNITS/HR: 900 INJECTION, SOLUTION INTRAVENOUS at 11:08

## 2020-01-01 RX ADMIN — MIDAZOLAM 1 MG: 1 INJECTION INTRAMUSCULAR; INTRAVENOUS at 12:13

## 2020-01-01 RX ADMIN — SODIUM CHLORIDE 10 UNITS/HR: 900 INJECTION, SOLUTION INTRAVENOUS at 05:05

## 2020-01-01 RX ADMIN — INSULIN LISPRO 2 UNITS: 100 INJECTION, SOLUTION INTRAVENOUS; SUBCUTANEOUS at 17:20

## 2020-01-01 RX ADMIN — Medication 10 ML: at 21:24

## 2020-01-01 RX ADMIN — EPINEPHRINE 0.04 MCG/KG/MIN: 1 INJECTION PARENTERAL at 01:39

## 2020-01-01 RX ADMIN — EPOETIN ALFA-EPBX 14000 UNITS: 10000 INJECTION, SOLUTION INTRAVENOUS; SUBCUTANEOUS at 14:37

## 2020-01-01 RX ADMIN — CHLORHEXIDINE GLUCONATE 10 ML: 1.2 RINSE ORAL at 09:49

## 2020-01-01 RX ADMIN — LIDOCAINE HYDROCHLORIDE 100 MG: 20 INJECTION, SOLUTION EPIDURAL; INFILTRATION; INTRACAUDAL; PERINEURAL at 10:57

## 2020-01-01 RX ADMIN — ALBUTEROL SULFATE 2.5 MG: 2.5 SOLUTION RESPIRATORY (INHALATION) at 07:25

## 2020-01-01 RX ADMIN — SODIUM CHLORIDE SOLN NEBU 3% 1 ML: 3 NEBU SOLN at 08:23

## 2020-01-01 RX ADMIN — NOREPINEPHRINE BITARTRATE 0.19 MCG/KG/MIN: 1 INJECTION, SOLUTION, CONCENTRATE INTRAVENOUS at 19:33

## 2020-01-01 RX ADMIN — LOPERAMIDE HYDROCHLORIDE 2 MG: 2 CAPSULE ORAL at 11:25

## 2020-01-01 RX ADMIN — Medication 10 ML: at 16:51

## 2020-01-01 RX ADMIN — Medication 40 ML: at 21:36

## 2020-01-01 RX ADMIN — Medication 10 ML: at 21:54

## 2020-01-01 RX ADMIN — METRONIDAZOLE 500 MG: 500 INJECTION, SOLUTION INTRAVENOUS at 09:24

## 2020-01-01 RX ADMIN — SERTRALINE HYDROCHLORIDE 100 MG: 50 TABLET ORAL at 08:16

## 2020-01-01 RX ADMIN — WARFARIN SODIUM 3 MG: 1 TABLET ORAL at 10:49

## 2020-01-01 RX ADMIN — INSULIN GLARGINE 5 UNITS: 100 INJECTION, SOLUTION SUBCUTANEOUS at 21:37

## 2020-01-01 RX ADMIN — Medication 2 SPRAY: at 08:27

## 2020-01-01 RX ADMIN — WARFARIN SODIUM 6 MG: 5 TABLET ORAL at 17:39

## 2020-01-01 RX ADMIN — MIRTAZAPINE 15 MG: 15 TABLET, FILM COATED ORAL at 21:52

## 2020-01-01 RX ADMIN — MAGNESIUM SULFATE HEPTAHYDRATE 1 G: 1 INJECTION, SOLUTION INTRAVENOUS at 08:56

## 2020-01-01 RX ADMIN — SODIUM CHLORIDE SOLN NEBU 3% 4 ML: 3 NEBU SOLN at 07:35

## 2020-01-01 RX ADMIN — AZTREONAM 2 G: 2 INJECTION, POWDER, LYOPHILIZED, FOR SOLUTION INTRAMUSCULAR; INTRAVENOUS at 09:55

## 2020-01-01 RX ADMIN — Medication 10 ML: at 21:13

## 2020-01-01 RX ADMIN — AMIODARONE HYDROCHLORIDE 400 MG: 200 TABLET ORAL at 21:49

## 2020-01-01 RX ADMIN — INSULIN LISPRO 2 UNITS: 100 INJECTION, SOLUTION INTRAVENOUS; SUBCUTANEOUS at 22:55

## 2020-01-01 RX ADMIN — ALBUTEROL SULFATE 2.5 MG: 2.5 SOLUTION RESPIRATORY (INHALATION) at 01:32

## 2020-01-01 RX ADMIN — WARFARIN SODIUM 5 MG: 5 TABLET ORAL at 17:05

## 2020-01-01 RX ADMIN — SODIUM CHLORIDE 0.5 MCG/KG/MIN: 900 INJECTION, SOLUTION INTRAVENOUS at 06:23

## 2020-01-01 RX ADMIN — NITROGLYCERIN 1 INCH: 20 OINTMENT TOPICAL at 16:20

## 2020-01-01 RX ADMIN — DAKIN'S SOLUTION 0.125% (QUARTER STRENGTH): 0.12 SOLUTION at 12:02

## 2020-01-01 RX ADMIN — SODIUM BICARBONATE 50 MEQ: 84 INJECTION, SOLUTION INTRAVENOUS at 00:30

## 2020-01-01 RX ADMIN — ALTEPLASE 1 MG: KIT at 14:27

## 2020-01-01 RX ADMIN — WARFARIN SODIUM 4 MG: 1 TABLET ORAL at 17:30

## 2020-01-01 RX ADMIN — IOPAMIDOL 75 ML: 755 INJECTION, SOLUTION INTRAVENOUS at 00:47

## 2020-01-01 RX ADMIN — BUSPIRONE HYDROCHLORIDE 10 MG: 5 TABLET ORAL at 21:46

## 2020-01-01 RX ADMIN — DEXMEDETOMIDINE 0.7 MCG/KG/HR: 100 INJECTION, SOLUTION, CONCENTRATE INTRAVENOUS at 16:41

## 2020-01-01 RX ADMIN — AZTREONAM 2 G: 2 INJECTION, POWDER, LYOPHILIZED, FOR SOLUTION INTRAMUSCULAR; INTRAVENOUS at 20:38

## 2020-01-01 RX ADMIN — Medication 0.16 MCG/KG/MIN: at 19:15

## 2020-01-01 RX ADMIN — BUSPIRONE HYDROCHLORIDE 10 MG: 10 TABLET ORAL at 21:15

## 2020-01-01 RX ADMIN — MIDAZOLAM 4 MG: 1 INJECTION INTRAMUSCULAR; INTRAVENOUS at 15:45

## 2020-01-01 RX ADMIN — INSULIN HUMAN 5 UNITS: 100 INJECTION, SOLUTION PARENTERAL at 21:03

## 2020-01-01 RX ADMIN — NITROGLYCERIN 1 INCH: 20 OINTMENT TOPICAL at 08:32

## 2020-01-01 RX ADMIN — OXYCODONE HYDROCHLORIDE AND ACETAMINOPHEN 1 TABLET: 5; 325 TABLET ORAL at 19:50

## 2020-01-01 RX ADMIN — BUSPIRONE HYDROCHLORIDE 10 MG: 5 TABLET ORAL at 17:08

## 2020-01-01 RX ADMIN — Medication 5 ML: at 17:35

## 2020-01-01 RX ADMIN — MEROPENEM 500 MG: 500 INJECTION, POWDER, FOR SOLUTION INTRAVENOUS at 21:58

## 2020-01-01 RX ADMIN — ACETAMINOPHEN 650 MG: 325 SOLUTION ORAL at 16:25

## 2020-01-01 RX ADMIN — Medication 50 MCG/HR: at 01:27

## 2020-01-01 RX ADMIN — Medication 5 ML: at 22:03

## 2020-01-01 RX ADMIN — INSULIN LISPRO 6 UNITS: 100 INJECTION, SOLUTION INTRAVENOUS; SUBCUTANEOUS at 12:33

## 2020-01-01 RX ADMIN — BUSPIRONE HYDROCHLORIDE 10 MG: 10 TABLET ORAL at 12:19

## 2020-01-01 RX ADMIN — MIDODRINE HYDROCHLORIDE 10 MG: 5 TABLET ORAL at 12:03

## 2020-01-01 RX ADMIN — ALPRAZOLAM 0.5 MG: 0.5 TABLET ORAL at 21:10

## 2020-01-01 RX ADMIN — DEXMEDETOMIDINE 0.7 MCG/KG/HR: 100 INJECTION, SOLUTION, CONCENTRATE INTRAVENOUS at 01:45

## 2020-01-01 RX ADMIN — FAMOTIDINE 20 MG: 10 INJECTION INTRAVENOUS at 08:13

## 2020-01-01 RX ADMIN — INSULIN HUMAN 5 UNITS: 100 INJECTION, SOLUTION PARENTERAL at 06:45

## 2020-01-01 RX ADMIN — Medication 10 ML: at 13:32

## 2020-01-01 RX ADMIN — BUSPIRONE HYDROCHLORIDE 10 MG: 5 TABLET ORAL at 21:55

## 2020-01-01 RX ADMIN — MIRTAZAPINE 15 MG: 15 TABLET, FILM COATED ORAL at 21:09

## 2020-01-01 RX ADMIN — Medication 1 AMPULE: at 09:14

## 2020-01-01 RX ADMIN — MORPHINE SULFATE 2 MG: 2 INJECTION, SOLUTION INTRAMUSCULAR; INTRAVENOUS at 14:37

## 2020-01-01 RX ADMIN — ALBUTEROL SULFATE 2.5 MG: 2.5 SOLUTION RESPIRATORY (INHALATION) at 07:32

## 2020-01-01 RX ADMIN — SODIUM CHLORIDE SOLN NEBU 3% 4 ML: 3 NEBU SOLN at 10:48

## 2020-01-01 RX ADMIN — IPRATROPIUM BROMIDE AND ALBUTEROL SULFATE 3 ML: .5; 3 SOLUTION RESPIRATORY (INHALATION) at 11:06

## 2020-01-01 RX ADMIN — INSULIN GLARGINE 28 UNITS: 100 INJECTION, SOLUTION SUBCUTANEOUS at 12:19

## 2020-01-01 RX ADMIN — ALUMINUM HYDROXIDE, MAGNESIUM HYDROXIDE, AND SIMETHICONE 30 ML: 200; 200; 20 SUSPENSION ORAL at 22:45

## 2020-01-01 RX ADMIN — ESCITALOPRAM OXALATE 10 MG: 10 TABLET ORAL at 07:45

## 2020-01-01 RX ADMIN — ALPRAZOLAM 0.25 MG: 0.25 TABLET ORAL at 22:03

## 2020-01-01 RX ADMIN — FUROSEMIDE 40 MG: 10 INJECTION, SOLUTION INTRAMUSCULAR; INTRAVENOUS at 18:05

## 2020-01-01 RX ADMIN — ACETAMINOPHEN 650 MG: 325 TABLET, FILM COATED ORAL at 08:31

## 2020-01-01 RX ADMIN — LORATADINE 10 MG: 10 TABLET ORAL at 09:50

## 2020-01-01 RX ADMIN — MIDAZOLAM 1 MG: 1 INJECTION INTRAMUSCULAR; INTRAVENOUS at 10:16

## 2020-01-01 RX ADMIN — Medication 2 SPRAY: at 08:28

## 2020-01-01 RX ADMIN — Medication 10 ML: at 15:09

## 2020-01-01 RX ADMIN — AMIODARONE HYDROCHLORIDE 1 MG/MIN: 50 INJECTION, SOLUTION INTRAVENOUS at 02:52

## 2020-01-01 RX ADMIN — BUSPIRONE HYDROCHLORIDE 10 MG: 10 TABLET ORAL at 16:08

## 2020-01-01 RX ADMIN — BUSPIRONE HYDROCHLORIDE 10 MG: 5 TABLET ORAL at 21:29

## 2020-01-01 RX ADMIN — Medication 2 SPRAY: at 17:27

## 2020-01-01 RX ADMIN — AMIODARONE HYDROCHLORIDE 0.5 MG/MIN: 50 INJECTION, SOLUTION INTRAVENOUS at 05:01

## 2020-01-01 RX ADMIN — VASOPRESSIN 0.05 UNITS/MIN: 20 INJECTION INTRAVENOUS at 06:20

## 2020-01-01 RX ADMIN — NITROGLYCERIN 1 INCH: 20 OINTMENT TOPICAL at 21:48

## 2020-01-01 RX ADMIN — HEPARIN SODIUM 1.7 UNITS: 1000 INJECTION INTRAVENOUS; SUBCUTANEOUS at 08:23

## 2020-01-01 RX ADMIN — ACETAMINOPHEN 650 MG: 325 TABLET, FILM COATED ORAL at 18:11

## 2020-01-01 RX ADMIN — NITROGLYCERIN 1 INCH: 20 OINTMENT TOPICAL at 09:10

## 2020-01-01 RX ADMIN — BUSPIRONE HYDROCHLORIDE 10 MG: 10 TABLET ORAL at 17:02

## 2020-01-01 RX ADMIN — ACETAMINOPHEN 650 MG: 325 TABLET, FILM COATED ORAL at 04:00

## 2020-01-01 RX ADMIN — NOREPINEPHRINE BITARTRATE 0.2 MCG/KG/MIN: 1 INJECTION INTRAVENOUS at 04:43

## 2020-01-01 RX ADMIN — NITROGLYCERIN 1 INCH: 20 OINTMENT TOPICAL at 20:27

## 2020-01-01 RX ADMIN — MORPHINE SULFATE 3 MG: 10 INJECTION INTRAVENOUS at 22:25

## 2020-01-01 RX ADMIN — CLINDAMYCIN PHOSPHATE 900 MG: 900 INJECTION, SOLUTION INTRAVENOUS at 22:48

## 2020-01-01 RX ADMIN — INSULIN GLARGINE 25 UNITS: 100 INJECTION, SOLUTION SUBCUTANEOUS at 21:33

## 2020-01-01 RX ADMIN — Medication 5 ML: at 21:33

## 2020-01-01 RX ADMIN — INSULIN LISPRO 6 UNITS: 100 INJECTION, SOLUTION INTRAVENOUS; SUBCUTANEOUS at 21:38

## 2020-01-01 RX ADMIN — VANCOMYCIN HYDROCHLORIDE 1250 MG: 10 INJECTION, POWDER, LYOPHILIZED, FOR SOLUTION INTRAVENOUS at 11:18

## 2020-01-01 RX ADMIN — Medication 5 ML: at 23:11

## 2020-01-01 RX ADMIN — Medication 1 AMPULE: at 20:39

## 2020-01-01 RX ADMIN — VASOPRESSIN 0.05 UNITS/MIN: 20 INJECTION INTRAVENOUS at 23:30

## 2020-01-01 RX ADMIN — ALBUTEROL SULFATE 2.5 MG: 2.5 SOLUTION RESPIRATORY (INHALATION) at 21:07

## 2020-01-01 RX ADMIN — ALBUTEROL SULFATE 2.5 MG: 2.5 SOLUTION RESPIRATORY (INHALATION) at 01:46

## 2020-01-01 RX ADMIN — ALPRAZOLAM 0.25 MG: 0.25 TABLET ORAL at 06:42

## 2020-01-01 RX ADMIN — Medication 5 ML: at 04:52

## 2020-01-01 RX ADMIN — ATORVASTATIN CALCIUM 80 MG: 40 TABLET, FILM COATED ORAL at 21:34

## 2020-01-01 RX ADMIN — ALBUTEROL SULFATE 2.5 MG: 2.5 SOLUTION RESPIRATORY (INHALATION) at 02:32

## 2020-01-01 RX ADMIN — IPRATROPIUM BROMIDE AND ALBUTEROL SULFATE 3 ML: .5; 3 SOLUTION RESPIRATORY (INHALATION) at 15:39

## 2020-01-01 RX ADMIN — ALPRAZOLAM 0.5 MG: 0.5 TABLET ORAL at 20:34

## 2020-01-01 RX ADMIN — INSULIN GLARGINE 28 UNITS: 100 INJECTION, SOLUTION SUBCUTANEOUS at 09:13

## 2020-01-01 RX ADMIN — ALBUMIN (HUMAN) 250 ML: 12.5 INJECTION, SOLUTION INTRAVENOUS at 12:59

## 2020-01-01 RX ADMIN — NOREPINEPHRINE BITARTRATE 0.2 MCG/KG/MIN: 1 INJECTION, SOLUTION, CONCENTRATE INTRAVENOUS at 17:19

## 2020-01-01 RX ADMIN — MORPHINE SULFATE 5 MG: 10 INJECTION INTRAVENOUS at 07:42

## 2020-01-01 RX ADMIN — HYDROCODONE BITARTRATE AND ACETAMINOPHEN 1 TABLET: 5; 325 TABLET ORAL at 18:51

## 2020-01-01 RX ADMIN — INSULIN HUMAN 5 UNITS: 100 INJECTION, SOLUTION PARENTERAL at 06:41

## 2020-01-01 RX ADMIN — Medication 10 ML: at 15:13

## 2020-01-01 RX ADMIN — BUSPIRONE HYDROCHLORIDE 10 MG: 10 TABLET ORAL at 21:31

## 2020-01-01 RX ADMIN — SODIUM BICARBONATE 50 MEQ: 84 INJECTION, SOLUTION INTRAVENOUS at 14:58

## 2020-01-01 RX ADMIN — Medication 1 AMPULE: at 09:32

## 2020-01-01 RX ADMIN — INSULIN LISPRO 9 UNITS: 100 INJECTION, SOLUTION INTRAVENOUS; SUBCUTANEOUS at 21:53

## 2020-01-01 RX ADMIN — WARFARIN SODIUM 6 MG: 5 TABLET ORAL at 16:21

## 2020-01-01 RX ADMIN — INSULIN LISPRO 15 UNITS: 100 INJECTION, SOLUTION INTRAVENOUS; SUBCUTANEOUS at 08:38

## 2020-01-01 RX ADMIN — INSULIN HUMAN 15 UNITS: 100 INJECTION, SOLUTION PARENTERAL at 21:37

## 2020-01-01 RX ADMIN — FAMOTIDINE 20 MG: 20 TABLET, FILM COATED ORAL at 08:39

## 2020-01-01 RX ADMIN — ALBUTEROL SULFATE 2.5 MG: 2.5 SOLUTION RESPIRATORY (INHALATION) at 07:46

## 2020-01-01 RX ADMIN — MEROPENEM 500 MG: 500 INJECTION, POWDER, FOR SOLUTION INTRAVENOUS at 04:40

## 2020-01-01 RX ADMIN — BUSPIRONE HYDROCHLORIDE 10 MG: 10 TABLET ORAL at 08:19

## 2020-01-01 RX ADMIN — SODIUM CHLORIDE: 900 INJECTION, SOLUTION INTRAVENOUS at 14:15

## 2020-01-01 RX ADMIN — BISACODYL 5 MG: 5 TABLET, COATED ORAL at 14:38

## 2020-01-01 RX ADMIN — SODIUM CHLORIDE SOLN NEBU 3% 4 ML: 3 NEBU SOLN at 19:50

## 2020-01-01 RX ADMIN — ALUMINUM HYDROXIDE, MAGNESIUM HYDROXIDE, AND SIMETHICONE 30 ML: 200; 200; 20 SUSPENSION ORAL at 15:06

## 2020-01-01 RX ADMIN — AMIODARONE HYDROCHLORIDE 400 MG: 200 TABLET ORAL at 21:07

## 2020-01-01 RX ADMIN — LEVOFLOXACIN 750 MG: 5 INJECTION, SOLUTION INTRAVENOUS at 15:43

## 2020-01-01 RX ADMIN — POLYETHYLENE GLYCOL 3350 238 G: 17 POWDER, FOR SOLUTION ORAL at 18:11

## 2020-01-01 RX ADMIN — METRONIDAZOLE 500 MG: 500 INJECTION, SOLUTION INTRAVENOUS at 21:44

## 2020-01-01 RX ADMIN — NOREPINEPHRINE BITARTRATE 0.2 MCG/KG/MIN: 1 INJECTION INTRAVENOUS at 04:16

## 2020-01-01 RX ADMIN — TRAZODONE HYDROCHLORIDE 100 MG: 50 TABLET ORAL at 21:40

## 2020-01-01 RX ADMIN — ALBUMIN (HUMAN) 25 G: 12.5 INJECTION, SOLUTION INTRAVENOUS at 17:17

## 2020-01-01 RX ADMIN — FLUDROCORTISONE ACETATE 0.1 MG: 0.1 TABLET ORAL at 08:27

## 2020-01-01 RX ADMIN — BUSPIRONE HYDROCHLORIDE 10 MG: 10 TABLET ORAL at 18:38

## 2020-01-01 RX ADMIN — SODIUM CHLORIDE 6 UNITS/HR: 900 INJECTION, SOLUTION INTRAVENOUS at 17:29

## 2020-01-01 RX ADMIN — Medication 5 ML: at 14:18

## 2020-01-01 RX ADMIN — ACETAMINOPHEN 650 MG: 325 TABLET, FILM COATED ORAL at 23:22

## 2020-01-01 RX ADMIN — INSULIN LISPRO 15 UNITS: 100 INJECTION, SOLUTION INTRAVENOUS; SUBCUTANEOUS at 21:00

## 2020-01-01 RX ADMIN — ALBUTEROL SULFATE 2.5 MG: 2.5 SOLUTION RESPIRATORY (INHALATION) at 20:49

## 2020-01-01 RX ADMIN — SODIUM CHLORIDE 25 ML/HR: 900 INJECTION, SOLUTION INTRAVENOUS at 03:12

## 2020-01-01 RX ADMIN — TRAZODONE HYDROCHLORIDE 100 MG: 50 TABLET ORAL at 21:10

## 2020-01-01 RX ADMIN — HEPARIN SODIUM 1000 UNITS: 1000 INJECTION INTRAVENOUS; SUBCUTANEOUS at 09:05

## 2020-01-01 RX ADMIN — INSULIN LISPRO 4 UNITS: 100 INJECTION, SOLUTION INTRAVENOUS; SUBCUTANEOUS at 11:05

## 2020-01-01 RX ADMIN — MIDODRINE HYDROCHLORIDE 10 MG: 5 TABLET ORAL at 16:47

## 2020-01-01 RX ADMIN — GUAIFENESIN 1200 MG: 600 TABLET ORAL at 10:02

## 2020-01-01 RX ADMIN — INSULIN GLARGINE 25 UNITS: 100 INJECTION, SOLUTION SUBCUTANEOUS at 21:54

## 2020-01-01 RX ADMIN — ALBUTEROL SULFATE 2.5 MG: 2.5 SOLUTION RESPIRATORY (INHALATION) at 14:33

## 2020-01-01 RX ADMIN — ALBUTEROL SULFATE 2.5 MG: 2.5 SOLUTION RESPIRATORY (INHALATION) at 07:43

## 2020-01-01 RX ADMIN — ALPRAZOLAM 0.5 MG: 0.5 TABLET ORAL at 22:00

## 2020-01-01 RX ADMIN — CHLORHEXIDINE GLUCONATE 10 ML: 1.2 RINSE ORAL at 18:04

## 2020-01-01 RX ADMIN — HUMAN INSULIN 4 UNITS: 100 INJECTION, SOLUTION SUBCUTANEOUS at 22:23

## 2020-01-01 RX ADMIN — INSULIN LISPRO 6 UNITS: 100 INJECTION, SOLUTION INTRAVENOUS; SUBCUTANEOUS at 17:21

## 2020-01-01 RX ADMIN — WARFARIN SODIUM 7.5 MG: 7.5 TABLET ORAL at 18:04

## 2020-01-01 RX ADMIN — MIRTAZAPINE 15 MG: 15 TABLET, FILM COATED ORAL at 21:24

## 2020-01-01 RX ADMIN — ALBUTEROL SULFATE 2.5 MG: 2.5 SOLUTION RESPIRATORY (INHALATION) at 20:33

## 2020-01-01 RX ADMIN — FAMOTIDINE 20 MG: 20 TABLET, FILM COATED ORAL at 08:32

## 2020-01-01 RX ADMIN — POTASSIUM CHLORIDE 20 MEQ: 200 INJECTION, SOLUTION INTRAVENOUS at 01:30

## 2020-01-01 RX ADMIN — WARFARIN SODIUM 7.5 MG: 2 TABLET ORAL at 18:17

## 2020-01-01 RX ADMIN — TAMSULOSIN HYDROCHLORIDE 0.4 MG: 0.4 CAPSULE ORAL at 21:33

## 2020-01-01 RX ADMIN — PHENYLEPHRINE HYDROCHLORIDE 120 MCG: 10 INJECTION INTRAVENOUS at 08:00

## 2020-01-01 RX ADMIN — FUROSEMIDE 80 MG: 10 INJECTION, SOLUTION INTRAMUSCULAR; INTRAVENOUS at 01:59

## 2020-01-01 RX ADMIN — INSULIN LISPRO 4 UNITS: 100 INJECTION, SOLUTION INTRAVENOUS; SUBCUTANEOUS at 17:14

## 2020-01-01 RX ADMIN — BUSPIRONE HYDROCHLORIDE 10 MG: 10 TABLET ORAL at 21:11

## 2020-01-01 RX ADMIN — ALBUTEROL SULFATE 2.5 MG: 2.5 SOLUTION RESPIRATORY (INHALATION) at 08:48

## 2020-01-01 RX ADMIN — Medication 10 ML: at 15:01

## 2020-01-01 RX ADMIN — MIDODRINE HYDROCHLORIDE 10 MG: 5 TABLET ORAL at 16:39

## 2020-01-01 RX ADMIN — SODIUM CHLORIDE 0.25 MCG/KG/MIN: 900 INJECTION, SOLUTION INTRAVENOUS at 01:50

## 2020-01-01 RX ADMIN — PHENYLEPHRINE HYDROCHLORIDE 120 MCG: 10 INJECTION INTRAVENOUS at 09:55

## 2020-01-01 RX ADMIN — Medication 10 ML: at 06:39

## 2020-01-01 RX ADMIN — IOPAMIDOL 75 ML: 755 INJECTION, SOLUTION INTRAVENOUS at 00:04

## 2020-01-01 RX ADMIN — DAKIN'S SOLUTION 0.125% (QUARTER STRENGTH): 0.12 SOLUTION at 08:58

## 2020-01-01 RX ADMIN — BISACODYL 5 MG: 5 TABLET, COATED ORAL at 17:36

## 2020-01-01 RX ADMIN — Medication 10 MG: at 11:44

## 2020-01-01 RX ADMIN — PROPOFOL 100 MG: 10 INJECTION, EMULSION INTRAVENOUS at 10:57

## 2020-01-01 RX ADMIN — AMIODARONE HYDROCHLORIDE 400 MG: 200 TABLET ORAL at 08:24

## 2020-01-01 RX ADMIN — TUBERCULIN PURIFIED PROTEIN DERIVATIVE 5 UNITS: 5 INJECTION, SOLUTION INTRADERMAL at 17:11

## 2020-01-01 RX ADMIN — VASOPRESSIN 1 UNITS: 20 INJECTION INTRAVENOUS at 08:32

## 2020-01-01 RX ADMIN — Medication 10 ML: at 04:46

## 2020-01-01 RX ADMIN — MIRTAZAPINE 15 MG: 15 TABLET, FILM COATED ORAL at 22:23

## 2020-01-01 RX ADMIN — Medication 10 ML: at 06:09

## 2020-01-01 RX ADMIN — MIRTAZAPINE 15 MG: 30 TABLET, FILM COATED ORAL at 21:01

## 2020-01-01 RX ADMIN — INSULIN GLARGINE 18 UNITS: 100 INJECTION, SOLUTION SUBCUTANEOUS at 21:00

## 2020-01-01 RX ADMIN — NOREPINEPHRINE BITARTRATE 0.17 MCG/KG/MIN: 1 INJECTION INTRAVENOUS at 18:08

## 2020-01-01 RX ADMIN — ALBUTEROL SULFATE 2.5 MG: 2.5 SOLUTION RESPIRATORY (INHALATION) at 19:24

## 2020-01-01 RX ADMIN — AZTREONAM 2 G: 2 INJECTION, POWDER, LYOPHILIZED, FOR SOLUTION INTRAMUSCULAR; INTRAVENOUS at 09:25

## 2020-01-01 RX ADMIN — INSULIN GLARGINE 25 UNITS: 100 INJECTION, SOLUTION SUBCUTANEOUS at 21:09

## 2020-01-01 RX ADMIN — BUSPIRONE HYDROCHLORIDE 10 MG: 5 TABLET ORAL at 21:40

## 2020-01-01 RX ADMIN — SODIUM CHLORIDE 25 ML/HR: 900 INJECTION, SOLUTION INTRAVENOUS at 05:49

## 2020-01-01 RX ADMIN — ALPRAZOLAM 1 MG: 0.5 TABLET ORAL at 17:05

## 2020-01-01 RX ADMIN — BUSPIRONE HYDROCHLORIDE 10 MG: 10 TABLET ORAL at 08:51

## 2020-01-01 RX ADMIN — AMIODARONE HYDROCHLORIDE 0.5 MG/MIN: 50 INJECTION, SOLUTION INTRAVENOUS at 00:41

## 2020-01-01 RX ADMIN — ATORVASTATIN CALCIUM 80 MG: 40 TABLET, FILM COATED ORAL at 21:00

## 2020-01-01 RX ADMIN — INSULIN LISPRO 9 UNITS: 100 INJECTION, SOLUTION INTRAVENOUS; SUBCUTANEOUS at 17:02

## 2020-01-01 RX ADMIN — MIRTAZAPINE 15 MG: 15 TABLET, FILM COATED ORAL at 21:03

## 2020-01-01 RX ADMIN — ALPRAZOLAM 0.25 MG: 0.25 TABLET ORAL at 19:49

## 2020-01-01 RX ADMIN — ALBUTEROL SULFATE 2.5 MG: 2.5 SOLUTION RESPIRATORY (INHALATION) at 14:51

## 2020-01-01 RX ADMIN — BUSPIRONE HYDROCHLORIDE 10 MG: 5 TABLET ORAL at 21:15

## 2020-01-01 RX ADMIN — ATORVASTATIN CALCIUM 80 MG: 40 TABLET, FILM COATED ORAL at 21:40

## 2020-01-01 RX ADMIN — BUSPIRONE HYDROCHLORIDE 10 MG: 10 TABLET ORAL at 23:27

## 2020-01-01 RX ADMIN — POLYETHYLENE GLYCOL 3350 17 G: 17 POWDER, FOR SOLUTION ORAL at 08:34

## 2020-01-01 RX ADMIN — MEROPENEM 500 MG: 500 INJECTION, POWDER, FOR SOLUTION INTRAVENOUS at 12:06

## 2020-01-01 RX ADMIN — VASOPRESSIN 1 UNITS: 20 INJECTION INTRAVENOUS at 08:20

## 2020-01-01 RX ADMIN — CLINDAMYCIN IN 5 PERCENT DEXTROSE 600 MG: 12 INJECTION, SOLUTION INTRAVENOUS at 13:30

## 2020-01-01 RX ADMIN — Medication 5 ML: at 06:13

## 2020-01-01 RX ADMIN — MIDAZOLAM 2 MG: 1 INJECTION INTRAMUSCULAR; INTRAVENOUS at 14:50

## 2020-01-01 RX ADMIN — NITROGLYCERIN 1 INCH: 20 OINTMENT TOPICAL at 16:06

## 2020-01-01 RX ADMIN — MIDODRINE HYDROCHLORIDE 10 MG: 5 TABLET ORAL at 16:37

## 2020-01-01 RX ADMIN — NOREPINEPHRINE BITARTRATE 0.18 MCG/KG/MIN: 1 INJECTION INTRAVENOUS at 20:35

## 2020-01-01 RX ADMIN — TRAMADOL HYDROCHLORIDE 50 MG: 50 TABLET, FILM COATED ORAL at 08:54

## 2020-01-01 RX ADMIN — DEXTROSE MONOHYDRATE AND SODIUM CHLORIDE 25 ML/HR: 5; .45 INJECTION, SOLUTION INTRAVENOUS at 16:52

## 2020-01-01 RX ADMIN — FUROSEMIDE 40 MG: 10 INJECTION, SOLUTION INTRAMUSCULAR; INTRAVENOUS at 08:25

## 2020-01-01 RX ADMIN — IPRATROPIUM BROMIDE AND ALBUTEROL SULFATE 3 ML: .5; 3 SOLUTION RESPIRATORY (INHALATION) at 16:04

## 2020-01-01 RX ADMIN — BUSPIRONE HYDROCHLORIDE 10 MG: 10 TABLET ORAL at 22:23

## 2020-01-01 RX ADMIN — AMIODARONE HYDROCHLORIDE 400 MG: 200 TABLET ORAL at 20:22

## 2020-01-01 RX ADMIN — BUPROPION HYDROCHLORIDE 75 MG: 75 TABLET, FILM COATED ORAL at 17:58

## 2020-01-01 RX ADMIN — MIDAZOLAM 1 MG: 1 INJECTION INTRAMUSCULAR; INTRAVENOUS at 01:01

## 2020-01-01 RX ADMIN — MIDAZOLAM 5 MG: 1 INJECTION INTRAMUSCULAR; INTRAVENOUS at 11:51

## 2020-01-01 RX ADMIN — ACETAMINOPHEN 650 MG: 325 TABLET, FILM COATED ORAL at 16:17

## 2020-01-01 RX ADMIN — IPRATROPIUM BROMIDE AND ALBUTEROL SULFATE 3 ML: .5; 3 SOLUTION RESPIRATORY (INHALATION) at 15:51

## 2020-01-01 RX ADMIN — BUSPIRONE HYDROCHLORIDE 5 MG: 5 TABLET ORAL at 17:02

## 2020-01-01 RX ADMIN — FAMOTIDINE 20 MG: 20 TABLET, FILM COATED ORAL at 08:03

## 2020-01-01 RX ADMIN — WARFARIN SODIUM 7.5 MG: 5 TABLET ORAL at 17:10

## 2020-01-01 RX ADMIN — SODIUM CHLORIDE 25 ML/HR: 900 INJECTION, SOLUTION INTRAVENOUS at 11:00

## 2020-01-01 RX ADMIN — ALBUTEROL SULFATE 2.5 MG: 2.5 SOLUTION RESPIRATORY (INHALATION) at 08:32

## 2020-01-01 RX ADMIN — AMIODARONE HYDROCHLORIDE 400 MG: 200 TABLET ORAL at 21:40

## 2020-01-01 RX ADMIN — Medication 1 AMPULE: at 21:34

## 2020-01-01 RX ADMIN — Medication 2 SPRAY: at 17:21

## 2020-01-01 RX ADMIN — BUSPIRONE HYDROCHLORIDE 10 MG: 10 TABLET ORAL at 20:39

## 2020-01-01 RX ADMIN — NOREPINEPHRINE BITARTRATE 0.17 MCG/KG/MIN: 1 INJECTION INTRAVENOUS at 12:16

## 2020-01-01 RX ADMIN — INSULIN LISPRO 3 UNITS: 100 INJECTION, SOLUTION INTRAVENOUS; SUBCUTANEOUS at 05:34

## 2020-01-01 RX ADMIN — MORPHINE SULFATE 5 MG: 10 INJECTION INTRAVENOUS at 20:26

## 2020-01-01 RX ADMIN — Medication 1 AMPULE: at 09:15

## 2020-01-01 RX ADMIN — FAMOTIDINE 20 MG: 20 TABLET, FILM COATED ORAL at 09:10

## 2020-01-01 RX ADMIN — INSULIN GLARGINE 28 UNITS: 100 INJECTION, SOLUTION SUBCUTANEOUS at 08:36

## 2020-01-01 RX ADMIN — BUSPIRONE HYDROCHLORIDE 5 MG: 5 TABLET ORAL at 21:32

## 2020-01-01 RX ADMIN — ALBUTEROL SULFATE 2.5 MG: 2.5 SOLUTION RESPIRATORY (INHALATION) at 07:52

## 2020-01-01 RX ADMIN — ALBUTEROL SULFATE 2.5 MG: 2.5 SOLUTION RESPIRATORY (INHALATION) at 16:02

## 2020-01-01 RX ADMIN — SODIUM CHLORIDE 6 UNITS/HR: 900 INJECTION, SOLUTION INTRAVENOUS at 02:06

## 2020-01-01 RX ADMIN — MIDAZOLAM 1 MG: 1 INJECTION INTRAMUSCULAR; INTRAVENOUS at 07:33

## 2020-01-01 RX ADMIN — AMIODARONE HYDROCHLORIDE 1 MG/MIN: 50 INJECTION, SOLUTION INTRAVENOUS at 11:39

## 2020-01-01 RX ADMIN — Medication 10 ML: at 21:21

## 2020-01-01 RX ADMIN — WARFARIN SODIUM 2 MG: 1 TABLET ORAL at 18:16

## 2020-01-01 RX ADMIN — NOREPINEPHRINE BITARTRATE 0.18 MCG/KG/MIN: 1 INJECTION, SOLUTION, CONCENTRATE INTRAVENOUS at 10:25

## 2020-01-01 RX ADMIN — ALBUTEROL SULFATE 2.5 MG: 2.5 SOLUTION RESPIRATORY (INHALATION) at 01:23

## 2020-01-01 RX ADMIN — LORATADINE 10 MG: 10 TABLET ORAL at 08:38

## 2020-01-01 RX ADMIN — METRONIDAZOLE 500 MG: 500 INJECTION, SOLUTION INTRAVENOUS at 09:25

## 2020-01-01 RX ADMIN — DEXMEDETOMIDINE 0.7 MCG/KG/HR: 100 INJECTION, SOLUTION, CONCENTRATE INTRAVENOUS at 01:39

## 2020-01-01 RX ADMIN — SERTRALINE HYDROCHLORIDE 25 MG: 50 TABLET ORAL at 09:37

## 2020-01-01 RX ADMIN — DAKIN'S SOLUTION 0.125% (QUARTER STRENGTH): 0.12 SOLUTION at 09:00

## 2020-01-01 RX ADMIN — FAMOTIDINE 20 MG: 20 TABLET, FILM COATED ORAL at 09:07

## 2020-01-01 RX ADMIN — WARFARIN SODIUM 6 MG: 5 TABLET ORAL at 17:27

## 2020-01-01 RX ADMIN — Medication 5 ML: at 22:19

## 2020-01-01 RX ADMIN — BUPROPION HYDROCHLORIDE 75 MG: 75 TABLET, FILM COATED ORAL at 17:14

## 2020-01-01 RX ADMIN — GUAIFENESIN 1200 MG: 600 TABLET ORAL at 21:33

## 2020-01-01 RX ADMIN — Medication 10 ML: at 21:57

## 2020-01-01 RX ADMIN — EPINEPHRINE 0.06 MCG/KG/MIN: 1 INJECTION PARENTERAL at 03:02

## 2020-01-01 RX ADMIN — ATORVASTATIN CALCIUM 80 MG: 40 TABLET, FILM COATED ORAL at 20:39

## 2020-01-01 RX ADMIN — CHLORHEXIDINE GLUCONATE 10 ML: 1.2 RINSE ORAL at 08:23

## 2020-01-01 RX ADMIN — INSULIN LISPRO 4 UNITS: 100 INJECTION, SOLUTION INTRAVENOUS; SUBCUTANEOUS at 16:54

## 2020-01-01 RX ADMIN — BUPROPION HYDROCHLORIDE 75 MG: 75 TABLET, FILM COATED ORAL at 08:26

## 2020-01-01 RX ADMIN — ALBUTEROL SULFATE 2.5 MG: 2.5 SOLUTION RESPIRATORY (INHALATION) at 08:00

## 2020-01-01 RX ADMIN — HUMAN INSULIN 4 UNITS: 100 INJECTION, SOLUTION SUBCUTANEOUS at 16:23

## 2020-01-01 RX ADMIN — SODIUM CHLORIDE SOLN NEBU 3% 4 ML: 3 NEBU SOLN at 19:19

## 2020-01-01 RX ADMIN — SODIUM CHLORIDE 1 UNITS: 900 INJECTION, SOLUTION INTRAVENOUS at 11:54

## 2020-01-01 RX ADMIN — MIDODRINE HYDROCHLORIDE 10 MG: 5 TABLET ORAL at 09:01

## 2020-01-01 RX ADMIN — VASOPRESSIN 0.1 UNITS/MIN: 20 INJECTION INTRAVENOUS at 08:46

## 2020-01-01 RX ADMIN — NOREPINEPHRINE BITARTRATE 0.16 MCG/KG/MIN: 1 INJECTION, SOLUTION, CONCENTRATE INTRAVENOUS at 16:40

## 2020-01-01 RX ADMIN — CLINDAMYCIN IN 5 PERCENT DEXTROSE 600 MG: 12 INJECTION, SOLUTION INTRAVENOUS at 22:17

## 2020-01-01 RX ADMIN — NITROGLYCERIN 1 INCH: 20 OINTMENT TOPICAL at 17:03

## 2020-01-01 RX ADMIN — FAMOTIDINE 20 MG: 20 TABLET, FILM COATED ORAL at 09:22

## 2020-01-01 RX ADMIN — CLINDAMYCIN IN 5 PERCENT DEXTROSE 600 MG: 12 INJECTION, SOLUTION INTRAVENOUS at 22:00

## 2020-01-01 RX ADMIN — Medication: at 08:43

## 2020-01-01 RX ADMIN — Medication 5 ML: at 21:56

## 2020-01-01 RX ADMIN — AMIODARONE HYDROCHLORIDE 400 MG: 200 TABLET ORAL at 09:33

## 2020-01-01 RX ADMIN — MIRTAZAPINE 7.5 MG: 15 TABLET, FILM COATED ORAL at 21:50

## 2020-01-01 RX ADMIN — BUSPIRONE HYDROCHLORIDE 10 MG: 5 TABLET ORAL at 21:18

## 2020-01-01 RX ADMIN — MIRTAZAPINE 15 MG: 15 TABLET, FILM COATED ORAL at 22:07

## 2020-01-01 RX ADMIN — POLYETHYLENE GLYCOL 3350 17 G: 17 POWDER, FOR SOLUTION ORAL at 08:38

## 2020-01-01 RX ADMIN — ALBUTEROL SULFATE 2.5 MG: 2.5 SOLUTION RESPIRATORY (INHALATION) at 20:19

## 2020-01-01 RX ADMIN — INSULIN LISPRO 8 UNITS: 100 INJECTION, SOLUTION INTRAVENOUS; SUBCUTANEOUS at 08:20

## 2020-01-01 RX ADMIN — CHLORHEXIDINE GLUCONATE 10 ML: 1.2 RINSE ORAL at 17:46

## 2020-01-01 RX ADMIN — FAMOTIDINE 20 MG: 20 TABLET, FILM COATED ORAL at 08:29

## 2020-01-01 RX ADMIN — DEXMEDETOMIDINE 0.5 MCG/KG/HR: 100 INJECTION, SOLUTION, CONCENTRATE INTRAVENOUS at 13:11

## 2020-01-01 RX ADMIN — INSULIN LISPRO 4 UNITS: 100 INJECTION, SOLUTION INTRAVENOUS; SUBCUTANEOUS at 08:55

## 2020-01-01 RX ADMIN — MIDODRINE HYDROCHLORIDE 10 MG: 5 TABLET ORAL at 17:36

## 2020-01-01 RX ADMIN — PHYTONADIONE 2.5 MG: 5 TABLET ORAL at 13:05

## 2020-01-01 RX ADMIN — TRAZODONE HYDROCHLORIDE 100 MG: 50 TABLET ORAL at 22:11

## 2020-01-01 RX ADMIN — TAMSULOSIN HYDROCHLORIDE 0.4 MG: 0.4 CAPSULE ORAL at 21:15

## 2020-01-01 RX ADMIN — ALBUTEROL SULFATE 2.5 MG: 2.5 SOLUTION RESPIRATORY (INHALATION) at 02:21

## 2020-01-01 RX ADMIN — WARFARIN SODIUM 5 MG: 5 TABLET ORAL at 17:31

## 2020-01-01 RX ADMIN — ALBUTEROL SULFATE 2.5 MG: 2.5 SOLUTION RESPIRATORY (INHALATION) at 08:33

## 2020-01-01 RX ADMIN — ATORVASTATIN CALCIUM 80 MG: 40 TABLET, FILM COATED ORAL at 23:46

## 2020-01-01 RX ADMIN — VASOPRESSIN 1 UNITS: 20 INJECTION INTRAVENOUS at 10:09

## 2020-01-01 RX ADMIN — ALBUTEROL SULFATE 2.5 MG: 2.5 SOLUTION RESPIRATORY (INHALATION) at 02:29

## 2020-01-01 RX ADMIN — PHENYLEPHRINE HYDROCHLORIDE 120 MCG: 10 INJECTION INTRAVENOUS at 10:39

## 2020-01-01 RX ADMIN — ALBUTEROL SULFATE 2.5 MG: 2.5 SOLUTION RESPIRATORY (INHALATION) at 10:48

## 2020-01-01 RX ADMIN — MIRTAZAPINE 15 MG: 15 TABLET, FILM COATED ORAL at 21:37

## 2020-01-01 RX ADMIN — Medication: at 08:57

## 2020-01-01 RX ADMIN — MIDODRINE HYDROCHLORIDE 10 MG: 5 TABLET ORAL at 11:24

## 2020-01-01 RX ADMIN — INSULIN GLARGINE 25 UNITS: 100 INJECTION, SOLUTION SUBCUTANEOUS at 22:24

## 2020-01-01 RX ADMIN — EPOETIN ALFA-EPBX 14000 UNITS: 10000 INJECTION, SOLUTION INTRAVENOUS; SUBCUTANEOUS at 16:35

## 2020-01-01 RX ADMIN — BUPROPION HYDROCHLORIDE 75 MG: 75 TABLET, FILM COATED ORAL at 22:50

## 2020-01-01 RX ADMIN — SODIUM CHLORIDE 1 G/HR: 900 INJECTION, SOLUTION INTRAVENOUS at 09:15

## 2020-01-01 RX ADMIN — Medication 1 AMPULE: at 20:49

## 2020-01-01 RX ADMIN — VASOPRESSIN 0.5 UNITS: 20 INJECTION INTRAVENOUS at 08:06

## 2020-01-01 RX ADMIN — SODIUM CHLORIDE 3 UNITS/HR: 900 INJECTION, SOLUTION INTRAVENOUS at 07:29

## 2020-01-01 RX ADMIN — BACITRACIN: 500 OINTMENT TOPICAL at 17:06

## 2020-01-01 RX ADMIN — AZTREONAM 2 G: 2 INJECTION, POWDER, LYOPHILIZED, FOR SOLUTION INTRAMUSCULAR; INTRAVENOUS at 20:24

## 2020-01-01 RX ADMIN — EPOETIN ALFA-EPBX 14000 UNITS: 10000 INJECTION, SOLUTION INTRAVENOUS; SUBCUTANEOUS at 16:37

## 2020-01-01 RX ADMIN — Medication 1 AMPULE: at 20:18

## 2020-01-01 RX ADMIN — POLYETHYLENE GLYCOL 3350 17 G: 17 POWDER, FOR SOLUTION ORAL at 09:10

## 2020-01-01 RX ADMIN — TAMSULOSIN HYDROCHLORIDE 0.4 MG: 0.4 CAPSULE ORAL at 21:18

## 2020-01-01 RX ADMIN — SODIUM CHLORIDE SOLN NEBU 3% 4 ML: 3 NEBU SOLN at 22:10

## 2020-01-01 RX ADMIN — INSULIN LISPRO 8 UNITS: 100 INJECTION, SOLUTION INTRAVENOUS; SUBCUTANEOUS at 12:22

## 2020-01-01 RX ADMIN — VASOPRESSIN 0.1 UNITS/MIN: 20 INJECTION INTRAVENOUS at 12:27

## 2020-01-01 RX ADMIN — AMIODARONE HYDROCHLORIDE 0.5 MG/MIN: 50 INJECTION, SOLUTION INTRAVENOUS at 12:15

## 2020-01-01 RX ADMIN — SODIUM CHLORIDE SOLN NEBU 3% 4 ML: 3 NEBU SOLN at 07:56

## 2020-01-01 RX ADMIN — Medication 25 MCG/HR: at 09:36

## 2020-01-01 RX ADMIN — Medication: at 10:07

## 2020-01-01 RX ADMIN — SODIUM CHLORIDE SOLN NEBU 3% 4 ML: 3 NEBU SOLN at 19:58

## 2020-01-01 RX ADMIN — SODIUM CHLORIDE SOLN NEBU 3% 4 ML: 3 NEBU SOLN at 07:38

## 2020-01-01 RX ADMIN — SODIUM CHLORIDE 3 UNITS/HR: 900 INJECTION, SOLUTION INTRAVENOUS at 00:20

## 2020-01-01 RX ADMIN — VASOPRESSIN 0.5 UNITS: 20 INJECTION INTRAVENOUS at 09:45

## 2020-01-01 RX ADMIN — Medication 10 ML: at 21:51

## 2020-01-01 RX ADMIN — FAMOTIDINE 20 MG: 20 TABLET, FILM COATED ORAL at 08:09

## 2020-01-01 RX ADMIN — INSULIN LISPRO 6 UNITS: 100 INJECTION, SOLUTION INTRAVENOUS; SUBCUTANEOUS at 17:31

## 2020-01-01 RX ADMIN — PHENYLEPHRINE HYDROCHLORIDE 120 MCG: 10 INJECTION INTRAVENOUS at 08:03

## 2020-01-01 RX ADMIN — INSULIN GLARGINE 25 UNITS: 100 INJECTION, SOLUTION SUBCUTANEOUS at 21:47

## 2020-01-01 RX ADMIN — MIRTAZAPINE 15 MG: 30 TABLET, FILM COATED ORAL at 20:37

## 2020-01-01 RX ADMIN — Medication 10 ML: at 14:04

## 2020-01-01 RX ADMIN — Medication 10 ML: at 18:21

## 2020-01-01 RX ADMIN — FENTANYL CITRATE 100 MCG: 50 INJECTION INTRAMUSCULAR; INTRAVENOUS at 08:43

## 2020-01-01 RX ADMIN — HUMAN INSULIN 2 UNITS: 100 INJECTION, SOLUTION SUBCUTANEOUS at 08:01

## 2020-01-01 RX ADMIN — NITROGLYCERIN 1 INCH: 20 OINTMENT TOPICAL at 08:41

## 2020-01-01 RX ADMIN — AZTREONAM 2 G: 2 INJECTION, POWDER, LYOPHILIZED, FOR SOLUTION INTRAMUSCULAR; INTRAVENOUS at 08:35

## 2020-01-01 RX ADMIN — FENTANYL CITRATE 100 MCG: 50 INJECTION INTRAMUSCULAR; INTRAVENOUS at 08:46

## 2020-01-01 RX ADMIN — FUROSEMIDE 80 MG: 10 INJECTION, SOLUTION INTRAMUSCULAR; INTRAVENOUS at 11:58

## 2020-01-01 RX ADMIN — ACETAMINOPHEN 1000 MG: 10 INJECTION, SOLUTION INTRAVENOUS at 23:02

## 2020-01-01 RX ADMIN — MEROPENEM 500 MG: 500 INJECTION, POWDER, FOR SOLUTION INTRAVENOUS at 21:30

## 2020-01-01 RX ADMIN — DEXMEDETOMIDINE 0.4 MCG/KG/HR: 100 INJECTION, SOLUTION, CONCENTRATE INTRAVENOUS at 01:21

## 2020-01-01 RX ADMIN — INSULIN HUMAN 5 UNITS: 100 INJECTION, SOLUTION PARENTERAL at 07:47

## 2020-01-01 RX ADMIN — INSULIN LISPRO 2 UNITS: 100 INJECTION, SOLUTION INTRAVENOUS; SUBCUTANEOUS at 12:34

## 2020-01-01 RX ADMIN — INSULIN GLARGINE 18 UNITS: 100 INJECTION, SOLUTION SUBCUTANEOUS at 21:12

## 2020-01-01 RX ADMIN — INSULIN GLARGINE 5 UNITS: 100 INJECTION, SOLUTION SUBCUTANEOUS at 21:53

## 2020-01-01 RX ADMIN — BUSPIRONE HYDROCHLORIDE 10 MG: 10 TABLET ORAL at 21:03

## 2020-01-01 RX ADMIN — INSULIN GLARGINE 25 UNITS: 100 INJECTION, SOLUTION SUBCUTANEOUS at 22:30

## 2020-01-01 RX ADMIN — ALPRAZOLAM 0.5 MG: 0.5 TABLET ORAL at 20:01

## 2020-01-01 RX ADMIN — INSULIN LISPRO 15 UNITS: 100 INJECTION, SOLUTION INTRAVENOUS; SUBCUTANEOUS at 09:03

## 2020-01-01 RX ADMIN — HUMAN INSULIN 4 UNITS: 100 INJECTION, SOLUTION SUBCUTANEOUS at 16:30

## 2020-01-01 RX ADMIN — MIRTAZAPINE 15 MG: 15 TABLET, FILM COATED ORAL at 22:46

## 2020-01-01 RX ADMIN — SERTRALINE HYDROCHLORIDE 100 MG: 50 TABLET ORAL at 09:06

## 2020-01-01 RX ADMIN — INSULIN HUMAN 5 UNITS: 100 INJECTION, SOLUTION PARENTERAL at 11:28

## 2020-01-01 RX ADMIN — MIDODRINE HYDROCHLORIDE 10 MG: 5 TABLET ORAL at 21:16

## 2020-01-01 RX ADMIN — DEXMEDETOMIDINE 0.2 MCG/KG/HR: 100 INJECTION, SOLUTION, CONCENTRATE INTRAVENOUS at 08:17

## 2020-01-01 RX ADMIN — Medication 10 ML: at 14:39

## 2020-01-01 RX ADMIN — SODIUM CHLORIDE SOLN NEBU 3% 4 ML: 3 NEBU SOLN at 20:25

## 2020-01-01 RX ADMIN — WARFARIN SODIUM 6 MG: 5 TABLET ORAL at 17:08

## 2020-01-01 RX ADMIN — INSULIN GLARGINE 28 UNITS: 100 INJECTION, SOLUTION SUBCUTANEOUS at 08:53

## 2020-01-01 RX ADMIN — AMIODARONE HYDROCHLORIDE 0.5 MG/MIN: 50 INJECTION, SOLUTION INTRAVENOUS at 19:12

## 2020-01-01 RX ADMIN — Medication 100 MCG/HR: at 10:21

## 2020-01-01 RX ADMIN — Medication 2 SPRAY: at 09:02

## 2020-01-01 RX ADMIN — FAMOTIDINE 20 MG: 40 POWDER, FOR SUSPENSION ORAL at 09:15

## 2020-01-01 RX ADMIN — Medication 10 ML: at 05:39

## 2020-01-01 RX ADMIN — INSULIN LISPRO 8 UNITS: 100 INJECTION, SOLUTION INTRAVENOUS; SUBCUTANEOUS at 12:34

## 2020-01-01 RX ADMIN — HEPARIN SODIUM 1800 UNITS: 1000 INJECTION INTRAVENOUS; SUBCUTANEOUS at 12:14

## 2020-01-01 RX ADMIN — VASOPRESSIN 0.05 UNITS/MIN: 20 INJECTION INTRAVENOUS at 03:57

## 2020-01-01 RX ADMIN — Medication 2 SPRAY: at 16:23

## 2020-01-01 RX ADMIN — Medication: at 09:11

## 2020-01-01 RX ADMIN — INSULIN LISPRO 6 UNITS: 100 INJECTION, SOLUTION INTRAVENOUS; SUBCUTANEOUS at 12:19

## 2020-01-01 RX ADMIN — ALPRAZOLAM 0.5 MG: 0.5 TABLET ORAL at 16:22

## 2020-01-01 RX ADMIN — ATORVASTATIN CALCIUM 80 MG: 40 TABLET, FILM COATED ORAL at 21:15

## 2020-01-01 RX ADMIN — IPRATROPIUM BROMIDE AND ALBUTEROL SULFATE 3 ML: .5; 3 SOLUTION RESPIRATORY (INHALATION) at 07:38

## 2020-01-01 RX ADMIN — CHLORHEXIDINE GLUCONATE 10 ML: 1.2 RINSE ORAL at 10:30

## 2020-01-01 RX ADMIN — CLINDAMYCIN IN 5 PERCENT DEXTROSE 600 MG: 12 INJECTION, SOLUTION INTRAVENOUS at 05:37

## 2020-01-01 RX ADMIN — Medication 10 ML: at 05:02

## 2020-01-01 RX ADMIN — PHENYLEPHRINE HYDROCHLORIDE 120 MCG: 10 INJECTION INTRAVENOUS at 08:11

## 2020-01-01 RX ADMIN — AMIODARONE HYDROCHLORIDE 400 MG: 200 TABLET ORAL at 08:59

## 2020-01-01 RX ADMIN — AZTREONAM 2 G: 2 INJECTION, POWDER, LYOPHILIZED, FOR SOLUTION INTRAMUSCULAR; INTRAVENOUS at 21:20

## 2020-01-01 RX ADMIN — VASOPRESSIN 2 UNITS: 20 INJECTION INTRAVENOUS at 12:44

## 2020-01-01 RX ADMIN — Medication 1 AMPULE: at 09:56

## 2020-01-01 RX ADMIN — IPRATROPIUM BROMIDE AND ALBUTEROL SULFATE 3 ML: .5; 3 SOLUTION RESPIRATORY (INHALATION) at 20:34

## 2020-01-01 RX ADMIN — ASPIRIN 81 MG 81 MG: 81 TABLET ORAL at 08:31

## 2020-01-01 RX ADMIN — AMIODARONE HYDROCHLORIDE 0.5 MG/MIN: 50 INJECTION, SOLUTION INTRAVENOUS at 08:48

## 2020-01-01 RX ADMIN — FUROSEMIDE 80 MG: 10 INJECTION, SOLUTION INTRAMUSCULAR; INTRAVENOUS at 01:23

## 2020-01-01 RX ADMIN — SODIUM CHLORIDE 0.5 MCG/KG/MIN: 900 INJECTION, SOLUTION INTRAVENOUS at 06:58

## 2020-01-01 RX ADMIN — Medication 1 AMPULE: at 21:17

## 2020-01-01 RX ADMIN — INSULIN LISPRO 3 UNITS: 100 INJECTION, SOLUTION INTRAVENOUS; SUBCUTANEOUS at 11:30

## 2020-01-01 RX ADMIN — VANCOMYCIN HYDROCHLORIDE 1000 MG: 1 INJECTION, POWDER, LYOPHILIZED, FOR SOLUTION INTRAVENOUS at 15:08

## 2020-01-01 RX ADMIN — LOPERAMIDE HYDROCHLORIDE 4 MG: 2 CAPSULE ORAL at 04:54

## 2020-01-01 RX ADMIN — VASOPRESSIN 1 UNITS: 20 INJECTION INTRAVENOUS at 09:55

## 2020-01-01 RX ADMIN — EPINEPHRINE 0.06 MCG/KG/MIN: 1 INJECTION PARENTERAL at 12:34

## 2020-01-01 RX ADMIN — INSULIN LISPRO 4 UNITS: 100 INJECTION, SOLUTION INTRAVENOUS; SUBCUTANEOUS at 12:05

## 2020-01-01 RX ADMIN — ATORVASTATIN CALCIUM 40 MG: 40 TABLET, FILM COATED ORAL at 21:23

## 2020-01-01 RX ADMIN — Medication 10 ML: at 04:29

## 2020-01-01 RX ADMIN — SERTRALINE HYDROCHLORIDE 100 MG: 100 TABLET ORAL at 08:23

## 2020-01-01 RX ADMIN — ALBUTEROL SULFATE 2.5 MG: 2.5 SOLUTION RESPIRATORY (INHALATION) at 08:34

## 2020-01-01 RX ADMIN — INSULIN LISPRO 2 UNITS: 100 INJECTION, SOLUTION INTRAVENOUS; SUBCUTANEOUS at 16:58

## 2020-01-01 RX ADMIN — FAMOTIDINE 20 MG: 20 TABLET, FILM COATED ORAL at 22:50

## 2020-01-01 RX ADMIN — ALBUMIN (HUMAN) 25 G: 12.5 INJECTION, SOLUTION INTRAVENOUS at 23:00

## 2020-01-01 RX ADMIN — FUROSEMIDE 80 MG: 10 INJECTION, SOLUTION INTRAMUSCULAR; INTRAVENOUS at 00:34

## 2020-01-01 RX ADMIN — FAMOTIDINE 20 MG: 20 TABLET, FILM COATED ORAL at 08:55

## 2020-01-01 RX ADMIN — Medication 1 AMPULE: at 08:24

## 2020-01-01 RX ADMIN — Medication: at 09:03

## 2020-01-01 RX ADMIN — FENTANYL CITRATE 50 MCG: 50 INJECTION INTRAMUSCULAR; INTRAVENOUS at 10:26

## 2020-01-01 RX ADMIN — ALBUTEROL SULFATE 2.5 MG: 2.5 SOLUTION RESPIRATORY (INHALATION) at 21:16

## 2020-01-01 RX ADMIN — BUPROPION HYDROCHLORIDE 75 MG: 75 TABLET, FILM COATED ORAL at 08:27

## 2020-01-01 RX ADMIN — MORPHINE SULFATE 2 MG: 2 INJECTION, SOLUTION INTRAMUSCULAR; INTRAVENOUS at 11:46

## 2020-01-01 RX ADMIN — ROCURONIUM BROMIDE 50 MG: 10 INJECTION, SOLUTION INTRAVENOUS at 11:51

## 2020-01-01 RX ADMIN — Medication 2 SPRAY: at 11:50

## 2020-01-01 RX ADMIN — POLYETHYLENE GLYCOL 3350 238 G: 17 POWDER, FOR SOLUTION ORAL at 15:44

## 2020-01-01 RX ADMIN — INSULIN LISPRO 4 UNITS: 100 INJECTION, SOLUTION INTRAVENOUS; SUBCUTANEOUS at 13:14

## 2020-01-01 RX ADMIN — BUSPIRONE HYDROCHLORIDE 10 MG: 10 TABLET ORAL at 16:06

## 2020-01-01 RX ADMIN — ALBUMIN (HUMAN) 12.5 G: 0.25 INJECTION, SOLUTION INTRAVENOUS at 14:03

## 2020-01-01 RX ADMIN — SERTRALINE HYDROCHLORIDE 50 MG: 50 TABLET ORAL at 09:06

## 2020-01-01 RX ADMIN — SODIUM CHLORIDE SOLN NEBU 3% 4 ML: 3 NEBU SOLN at 21:32

## 2020-01-01 RX ADMIN — INSULIN LISPRO 6 UNITS: 100 INJECTION, SOLUTION INTRAVENOUS; SUBCUTANEOUS at 16:23

## 2020-01-01 RX ADMIN — FAMOTIDINE 20 MG: 20 TABLET, FILM COATED ORAL at 09:09

## 2020-01-01 RX ADMIN — SERTRALINE HYDROCHLORIDE 100 MG: 50 TABLET ORAL at 08:05

## 2020-01-01 RX ADMIN — BUSPIRONE HYDROCHLORIDE 10 MG: 10 TABLET ORAL at 20:17

## 2020-01-01 RX ADMIN — FENTANYL CITRATE 100 MCG: 50 INJECTION INTRAMUSCULAR; INTRAVENOUS at 08:49

## 2020-01-01 RX ADMIN — SERTRALINE HYDROCHLORIDE 50 MG: 50 TABLET ORAL at 08:34

## 2020-01-01 RX ADMIN — CEFEPIME HYDROCHLORIDE 2 G: 2 INJECTION, POWDER, FOR SOLUTION INTRAVENOUS at 08:11

## 2020-01-01 RX ADMIN — VANCOMYCIN HYDROCHLORIDE 2500 MG: 10 INJECTION, POWDER, LYOPHILIZED, FOR SOLUTION INTRAVENOUS at 11:50

## 2020-01-01 RX ADMIN — SODIUM CHLORIDE 100 ML/HR: 900 INJECTION, SOLUTION INTRAVENOUS at 22:27

## 2020-01-01 RX ADMIN — Medication 2 SPRAY: at 08:11

## 2020-01-01 RX ADMIN — BUSPIRONE HYDROCHLORIDE 10 MG: 10 TABLET ORAL at 08:47

## 2020-01-01 RX ADMIN — ALBUTEROL SULFATE 2.5 MG: 2.5 SOLUTION RESPIRATORY (INHALATION) at 20:46

## 2020-01-01 RX ADMIN — INSULIN GLARGINE 5 UNITS: 100 INJECTION, SOLUTION SUBCUTANEOUS at 21:52

## 2020-01-01 RX ADMIN — ALBUTEROL SULFATE 2.5 MG: 2.5 SOLUTION RESPIRATORY (INHALATION) at 19:14

## 2020-01-01 RX ADMIN — CHLORHEXIDINE GLUCONATE 10 ML: 1.2 RINSE ORAL at 09:03

## 2020-01-01 RX ADMIN — AMIODARONE HYDROCHLORIDE 600 MG: 200 TABLET ORAL at 05:57

## 2020-01-01 RX ADMIN — INSULIN GLARGINE 18 UNITS: 100 INJECTION, SOLUTION SUBCUTANEOUS at 22:17

## 2020-01-01 RX ADMIN — INSULIN LISPRO 15 UNITS: 100 INJECTION, SOLUTION INTRAVENOUS; SUBCUTANEOUS at 12:02

## 2020-01-01 RX ADMIN — POTASSIUM CHLORIDE 20 MEQ: 200 INJECTION, SOLUTION INTRAVENOUS at 23:34

## 2020-01-01 RX ADMIN — BUPROPION HYDROCHLORIDE 75 MG: 75 TABLET, FILM COATED ORAL at 12:15

## 2020-01-01 RX ADMIN — INSULIN LISPRO 3 UNITS: 100 INJECTION, SOLUTION INTRAVENOUS; SUBCUTANEOUS at 21:03

## 2020-01-01 RX ADMIN — IPRATROPIUM BROMIDE AND ALBUTEROL SULFATE 3 ML: .5; 3 SOLUTION RESPIRATORY (INHALATION) at 20:00

## 2020-01-01 RX ADMIN — AZTREONAM 500 MG: 1 INJECTION, POWDER, LYOPHILIZED, FOR SOLUTION INTRAMUSCULAR; INTRAVENOUS at 20:38

## 2020-01-01 RX ADMIN — Medication 10 ML: at 13:20

## 2020-01-01 RX ADMIN — SODIUM CHLORIDE, SODIUM LACTATE, POTASSIUM CHLORIDE, AND CALCIUM CHLORIDE: 600; 310; 30; 20 INJECTION, SOLUTION INTRAVENOUS at 12:25

## 2020-01-01 RX ADMIN — WARFARIN SODIUM 4 MG: 1 TABLET ORAL at 17:24

## 2020-01-01 RX ADMIN — ALBUTEROL SULFATE 2.5 MG: 2.5 SOLUTION RESPIRATORY (INHALATION) at 14:35

## 2020-01-01 RX ADMIN — Medication 10 ML: at 13:37

## 2020-01-01 RX ADMIN — Medication 2 SPRAY: at 17:06

## 2020-01-01 RX ADMIN — MUPIROCIN: 20 OINTMENT TOPICAL at 08:36

## 2020-01-01 RX ADMIN — BUPROPION HYDROCHLORIDE 75 MG: 75 TABLET, FILM COATED ORAL at 08:38

## 2020-01-01 RX ADMIN — BUSPIRONE HYDROCHLORIDE 10 MG: 5 TABLET ORAL at 22:54

## 2020-01-01 RX ADMIN — MEROPENEM 500 MG: 500 INJECTION, POWDER, FOR SOLUTION INTRAVENOUS at 12:18

## 2020-01-01 RX ADMIN — ALBUTEROL SULFATE 2.5 MG: 2.5 SOLUTION RESPIRATORY (INHALATION) at 14:09

## 2020-01-01 RX ADMIN — DEXMEDETOMIDINE 0.2 MCG/KG/HR: 100 INJECTION, SOLUTION, CONCENTRATE INTRAVENOUS at 19:50

## 2020-01-01 RX ADMIN — INSULIN LISPRO 9 UNITS: 100 INJECTION, SOLUTION INTRAVENOUS; SUBCUTANEOUS at 16:47

## 2020-01-01 RX ADMIN — SODIUM CHLORIDE 0.5 MCG/KG/MIN: 900 INJECTION, SOLUTION INTRAVENOUS at 07:48

## 2020-01-01 RX ADMIN — BACITRACIN: 500 OINTMENT TOPICAL at 16:25

## 2020-01-01 RX ADMIN — SODIUM CHLORIDE SOLN NEBU 3% 4 ML: 3 NEBU SOLN at 07:59

## 2020-01-01 RX ADMIN — ALBUTEROL SULFATE 2.5 MG: 2.5 SOLUTION RESPIRATORY (INHALATION) at 08:29

## 2020-01-01 RX ADMIN — MIDODRINE HYDROCHLORIDE 10 MG: 5 TABLET ORAL at 16:50

## 2020-01-01 RX ADMIN — INSULIN LISPRO 6 UNITS: 100 INJECTION, SOLUTION INTRAVENOUS; SUBCUTANEOUS at 21:00

## 2020-01-01 RX ADMIN — Medication 10 ML: at 21:04

## 2020-01-01 RX ADMIN — LORATADINE 10 MG: 10 TABLET ORAL at 12:41

## 2020-01-01 RX ADMIN — ATORVASTATIN CALCIUM 80 MG: 40 TABLET, FILM COATED ORAL at 20:22

## 2020-01-01 RX ADMIN — Medication 2 SPRAY: at 12:42

## 2020-01-01 RX ADMIN — ALBUTEROL SULFATE 2.5 MG: 2.5 SOLUTION RESPIRATORY (INHALATION) at 01:57

## 2020-01-01 RX ADMIN — SODIUM CHLORIDE 125 MG: 900 INJECTION, SOLUTION INTRAVENOUS at 12:21

## 2020-01-01 RX ADMIN — Medication 10 ML: at 13:07

## 2020-01-01 RX ADMIN — SOYBEAN OIL 250 ML: 20 INJECTION, SOLUTION INTRAVENOUS at 18:02

## 2020-01-01 RX ADMIN — TAMSULOSIN HYDROCHLORIDE 0.4 MG: 0.4 CAPSULE ORAL at 22:54

## 2020-01-01 RX ADMIN — Medication 10 ML: at 21:33

## 2020-01-01 RX ADMIN — ALBUTEROL SULFATE 2.5 MG: 2.5 SOLUTION RESPIRATORY (INHALATION) at 21:14

## 2020-01-01 RX ADMIN — ASPIRIN 81 MG 81 MG: 81 TABLET ORAL at 11:57

## 2020-01-01 RX ADMIN — ONDANSETRON 4 MG: 2 INJECTION INTRAMUSCULAR; INTRAVENOUS at 16:02

## 2020-01-01 RX ADMIN — ALBUTEROL SULFATE 2.5 MG: 2.5 SOLUTION RESPIRATORY (INHALATION) at 01:15

## 2020-01-01 RX ADMIN — AMIODARONE HYDROCHLORIDE 0.5 MG/MIN: 50 INJECTION, SOLUTION INTRAVENOUS at 18:21

## 2020-01-01 RX ADMIN — SODIUM CHLORIDE 0.5 MCG/KG/MIN: 900 INJECTION, SOLUTION INTRAVENOUS at 22:36

## 2020-01-01 RX ADMIN — ALBUTEROL SULFATE 2.5 MG: 2.5 SOLUTION RESPIRATORY (INHALATION) at 07:51

## 2020-01-01 RX ADMIN — Medication 1 AMPULE: at 21:49

## 2020-01-01 RX ADMIN — WARFARIN SODIUM 4 MG: 2 TABLET ORAL at 17:18

## 2020-01-01 RX ADMIN — BUSPIRONE HYDROCHLORIDE 10 MG: 5 TABLET ORAL at 10:02

## 2020-01-01 RX ADMIN — ALPRAZOLAM 0.5 MG: 0.5 TABLET ORAL at 20:19

## 2020-01-01 RX ADMIN — ALPRAZOLAM 0.5 MG: 0.5 TABLET ORAL at 22:15

## 2020-01-01 RX ADMIN — CHLORHEXIDINE GLUCONATE 10 ML: 1.2 RINSE ORAL at 09:14

## 2020-01-01 RX ADMIN — LORATADINE 10 MG: 10 TABLET ORAL at 12:16

## 2020-01-01 RX ADMIN — MIRTAZAPINE 15 MG: 15 TABLET, FILM COATED ORAL at 21:53

## 2020-01-01 RX ADMIN — TAMSULOSIN HYDROCHLORIDE 0.4 MG: 0.4 CAPSULE ORAL at 08:05

## 2020-01-01 RX ADMIN — NOREPINEPHRINE BITARTRATE 0.2 MCG/KG/MIN: 1 INJECTION INTRAVENOUS at 08:40

## 2020-01-01 RX ADMIN — INSULIN LISPRO 12 UNITS: 100 INJECTION, SOLUTION INTRAVENOUS; SUBCUTANEOUS at 21:15

## 2020-01-01 RX ADMIN — Medication 10 ML: at 08:26

## 2020-01-01 RX ADMIN — INSULIN LISPRO 10 UNITS: 100 INJECTION, SOLUTION INTRAVENOUS; SUBCUTANEOUS at 11:48

## 2020-01-01 RX ADMIN — INSULIN LISPRO 3 UNITS: 100 INJECTION, SOLUTION INTRAVENOUS; SUBCUTANEOUS at 07:42

## 2020-01-01 RX ADMIN — INSULIN LISPRO 3 UNITS: 100 INJECTION, SOLUTION INTRAVENOUS; SUBCUTANEOUS at 08:24

## 2020-01-01 RX ADMIN — ALBUTEROL SULFATE 2.5 MG: 2.5 SOLUTION RESPIRATORY (INHALATION) at 14:52

## 2020-01-01 RX ADMIN — MIRTAZAPINE 15 MG: 15 TABLET, FILM COATED ORAL at 21:29

## 2020-01-01 RX ADMIN — SODIUM CHLORIDE 0.5 MCG/KG/MIN: 900 INJECTION, SOLUTION INTRAVENOUS at 19:40

## 2020-01-01 RX ADMIN — Medication 40 ML: at 06:25

## 2020-01-01 RX ADMIN — INSULIN GLARGINE 18 UNITS: 100 INJECTION, SOLUTION SUBCUTANEOUS at 21:27

## 2020-01-01 RX ADMIN — INSULIN LISPRO 10 UNITS: 100 INJECTION, SOLUTION INTRAVENOUS; SUBCUTANEOUS at 17:07

## 2020-01-01 RX ADMIN — SODIUM CHLORIDE 4 UNITS/HR: 900 INJECTION, SOLUTION INTRAVENOUS at 11:00

## 2020-01-01 RX ADMIN — FUROSEMIDE 40 MG: 40 TABLET ORAL at 09:16

## 2020-01-01 RX ADMIN — MEROPENEM 500 MG: 500 INJECTION, POWDER, FOR SOLUTION INTRAVENOUS at 11:01

## 2020-01-01 RX ADMIN — CHLORHEXIDINE GLUCONATE 10 ML: 1.2 RINSE ORAL at 17:01

## 2020-01-01 RX ADMIN — INSULIN LISPRO 2 UNITS: 100 INJECTION, SOLUTION INTRAVENOUS; SUBCUTANEOUS at 17:09

## 2020-01-01 RX ADMIN — MORPHINE SULFATE 4 MG: 10 INJECTION INTRAVENOUS at 06:11

## 2020-01-01 RX ADMIN — INSULIN LISPRO 8 UNITS: 100 INJECTION, SOLUTION INTRAVENOUS; SUBCUTANEOUS at 08:41

## 2020-01-01 RX ADMIN — Medication 10 ML: at 15:24

## 2020-01-01 RX ADMIN — ALBUTEROL SULFATE 2.5 MG: 2.5 SOLUTION RESPIRATORY (INHALATION) at 07:40

## 2020-01-01 RX ADMIN — MIRTAZAPINE 7.5 MG: 15 TABLET, FILM COATED ORAL at 21:32

## 2020-01-01 RX ADMIN — METRONIDAZOLE 500 MG: 500 INJECTION, SOLUTION INTRAVENOUS at 21:30

## 2020-01-01 RX ADMIN — Medication 40 ML: at 21:45

## 2020-01-01 RX ADMIN — TAMSULOSIN HYDROCHLORIDE 0.4 MG: 0.4 CAPSULE ORAL at 21:40

## 2020-01-01 RX ADMIN — Medication: at 12:24

## 2020-01-01 RX ADMIN — CLINDAMYCIN PHOSPHATE 900 MG: 900 INJECTION, SOLUTION INTRAVENOUS at 06:50

## 2020-01-01 RX ADMIN — INSULIN GLARGINE 5 UNITS: 100 INJECTION, SOLUTION SUBCUTANEOUS at 22:45

## 2020-01-01 RX ADMIN — ACETAMINOPHEN 650 MG: 160 SOLUTION ORAL at 18:00

## 2020-01-01 RX ADMIN — MEROPENEM 500 MG: 500 INJECTION, POWDER, FOR SOLUTION INTRAVENOUS at 12:13

## 2020-01-01 RX ADMIN — VASOPRESSIN 0.05 UNITS/MIN: 20 INJECTION INTRAVENOUS at 17:01

## 2020-01-01 RX ADMIN — ALBUTEROL SULFATE 2.5 MG: 2.5 SOLUTION RESPIRATORY (INHALATION) at 02:28

## 2020-01-01 RX ADMIN — ALBUTEROL SULFATE 2.5 MG: 2.5 SOLUTION RESPIRATORY (INHALATION) at 11:18

## 2020-01-01 RX ADMIN — INSULIN GLARGINE 28 UNITS: 100 INJECTION, SOLUTION SUBCUTANEOUS at 08:55

## 2020-01-01 RX ADMIN — ACETAMINOPHEN 1000 MG: 10 INJECTION, SOLUTION INTRAVENOUS at 20:36

## 2020-01-01 RX ADMIN — LIDOCAINE HYDROCHLORIDE 200 MG: 20 INJECTION, SOLUTION INFILTRATION; PERINEURAL at 10:57

## 2020-01-01 RX ADMIN — AMIODARONE HYDROCHLORIDE 1 MG/MIN: 50 INJECTION, SOLUTION INTRAVENOUS at 19:08

## 2020-01-01 RX ADMIN — MAGNESIUM SULFATE HEPTAHYDRATE 1 G: 1 INJECTION, SOLUTION INTRAVENOUS at 17:07

## 2020-01-01 RX ADMIN — NITROGLYCERIN 1 INCH: 20 OINTMENT TOPICAL at 21:19

## 2020-01-01 RX ADMIN — HUMAN INSULIN 4 UNITS: 100 INJECTION, SOLUTION SUBCUTANEOUS at 16:36

## 2020-01-01 RX ADMIN — BUPROPION HYDROCHLORIDE 75 MG: 75 TABLET, FILM COATED ORAL at 08:36

## 2020-01-01 RX ADMIN — ALBUTEROL SULFATE 2.5 MG: 2.5 SOLUTION RESPIRATORY (INHALATION) at 07:38

## 2020-01-01 RX ADMIN — MIDAZOLAM 1 MG: 1 INJECTION INTRAMUSCULAR; INTRAVENOUS at 06:35

## 2020-01-01 RX ADMIN — INSULIN LISPRO 6 UNITS: 100 INJECTION, SOLUTION INTRAVENOUS; SUBCUTANEOUS at 16:57

## 2020-01-01 RX ADMIN — DEXMEDETOMIDINE 0.7 MCG/KG/HR: 100 INJECTION, SOLUTION, CONCENTRATE INTRAVENOUS at 21:28

## 2020-01-01 RX ADMIN — BUSPIRONE HYDROCHLORIDE 10 MG: 10 TABLET ORAL at 15:00

## 2020-01-01 RX ADMIN — DAKIN'S SOLUTION 0.125% (QUARTER STRENGTH): 0.12 SOLUTION at 18:03

## 2020-01-01 RX ADMIN — SODIUM CHLORIDE, SODIUM LACTATE, POTASSIUM CHLORIDE, AND CALCIUM CHLORIDE: 600; 310; 30; 20 INJECTION, SOLUTION INTRAVENOUS at 08:15

## 2020-01-01 RX ADMIN — ALPRAZOLAM 0.5 MG: 0.5 TABLET ORAL at 04:58

## 2020-01-01 RX ADMIN — EPINEPHRINE 0.06 MCG/KG/MIN: 1 INJECTION PARENTERAL at 19:00

## 2020-01-01 RX ADMIN — ALPRAZOLAM 0.5 MG: 0.5 TABLET ORAL at 09:25

## 2020-01-01 RX ADMIN — NOREPINEPHRINE BITARTRATE 0.16 MCG/KG/MIN: 1 INJECTION INTRAVENOUS at 19:10

## 2020-01-01 RX ADMIN — ALPRAZOLAM 0.5 MG: 0.5 TABLET ORAL at 14:13

## 2020-01-01 RX ADMIN — TAMSULOSIN HYDROCHLORIDE 0.4 MG: 0.4 CAPSULE ORAL at 09:22

## 2020-01-01 RX ADMIN — INSULIN LISPRO 2 UNITS: 100 INJECTION, SOLUTION INTRAVENOUS; SUBCUTANEOUS at 11:19

## 2020-01-01 RX ADMIN — Medication 10 ML: at 06:48

## 2020-01-01 RX ADMIN — Medication 5 ML: at 05:40

## 2020-01-01 RX ADMIN — Medication 5 MG: at 07:56

## 2020-01-01 RX ADMIN — SODIUM CHLORIDE SOLN NEBU 3% 4 ML: 3 NEBU SOLN at 20:41

## 2020-01-01 RX ADMIN — AMIODARONE HYDROCHLORIDE 400 MG: 200 TABLET ORAL at 09:03

## 2020-01-01 RX ADMIN — Medication: at 09:53

## 2020-01-01 RX ADMIN — MIDODRINE HYDROCHLORIDE 10 MG: 5 TABLET ORAL at 09:08

## 2020-01-01 RX ADMIN — ALPRAZOLAM 0.5 MG: 0.5 TABLET ORAL at 21:53

## 2020-01-01 RX ADMIN — BUSPIRONE HYDROCHLORIDE 10 MG: 10 TABLET ORAL at 18:00

## 2020-01-01 RX ADMIN — FAMOTIDINE 20 MG: 20 TABLET, FILM COATED ORAL at 08:05

## 2020-01-01 RX ADMIN — Medication 1 AMPULE: at 09:10

## 2020-01-01 RX ADMIN — PROTAMINE SULFATE 500 MG: 10 INJECTION, SOLUTION INTRAVENOUS at 12:42

## 2020-01-01 RX ADMIN — MIDODRINE HYDROCHLORIDE 10 MG: 5 TABLET ORAL at 18:22

## 2020-01-01 RX ADMIN — ALBUTEROL SULFATE 2.5 MG: 2.5 SOLUTION RESPIRATORY (INHALATION) at 19:20

## 2020-01-01 RX ADMIN — WARFARIN SODIUM 6 MG: 5 TABLET ORAL at 17:06

## 2020-01-01 RX ADMIN — FUROSEMIDE 40 MG: 10 INJECTION, SOLUTION INTRAMUSCULAR; INTRAVENOUS at 08:30

## 2020-01-01 RX ADMIN — BUSPIRONE HYDROCHLORIDE 10 MG: 5 TABLET ORAL at 08:36

## 2020-01-01 RX ADMIN — BUSPIRONE HYDROCHLORIDE 10 MG: 5 TABLET ORAL at 17:17

## 2020-01-01 RX ADMIN — Medication 10 ML: at 21:28

## 2020-01-01 RX ADMIN — ALBUMIN (HUMAN) 12.5 G: 0.25 INJECTION, SOLUTION INTRAVENOUS at 00:19

## 2020-01-01 RX ADMIN — BUSPIRONE HYDROCHLORIDE 10 MG: 5 TABLET ORAL at 08:19

## 2020-01-01 RX ADMIN — Medication 5 ML: at 17:36

## 2020-01-01 RX ADMIN — INSULIN LISPRO 4 UNITS: 100 INJECTION, SOLUTION INTRAVENOUS; SUBCUTANEOUS at 16:24

## 2020-01-01 RX ADMIN — MIDODRINE HYDROCHLORIDE 5 MG: 5 TABLET ORAL at 18:16

## 2020-01-01 RX ADMIN — GUAIFENESIN 1200 MG: 600 TABLET ORAL at 08:34

## 2020-01-01 RX ADMIN — SODIUM CHLORIDE 0.5 MCG/KG/MIN: 900 INJECTION, SOLUTION INTRAVENOUS at 18:54

## 2020-01-01 RX ADMIN — INSULIN LISPRO 2 UNITS: 100 INJECTION, SOLUTION INTRAVENOUS; SUBCUTANEOUS at 08:26

## 2020-01-01 RX ADMIN — ALBUTEROL SULFATE 2.5 MG: 2.5 SOLUTION RESPIRATORY (INHALATION) at 21:05

## 2020-01-01 RX ADMIN — MIDODRINE HYDROCHLORIDE 10 MG: 5 TABLET ORAL at 21:53

## 2020-01-01 RX ADMIN — ALPRAZOLAM 0.25 MG: 0.25 TABLET ORAL at 08:21

## 2020-01-01 RX ADMIN — INSULIN LISPRO 2 UNITS: 100 INJECTION, SOLUTION INTRAVENOUS; SUBCUTANEOUS at 16:57

## 2020-01-01 RX ADMIN — IPRATROPIUM BROMIDE AND ALBUTEROL SULFATE 3 ML: .5; 3 SOLUTION RESPIRATORY (INHALATION) at 19:26

## 2020-01-01 RX ADMIN — AMIODARONE HYDROCHLORIDE 1 MG/MIN: 50 INJECTION, SOLUTION INTRAVENOUS at 01:32

## 2020-01-01 RX ADMIN — Medication 1 AMPULE: at 21:11

## 2020-01-01 RX ADMIN — CLONAZEPAM 0.5 MG: 0.5 TABLET ORAL at 21:55

## 2020-01-01 RX ADMIN — MIDODRINE HYDROCHLORIDE 10 MG: 5 TABLET ORAL at 16:30

## 2020-01-01 RX ADMIN — ALBUTEROL SULFATE 2.5 MG: 2.5 SOLUTION RESPIRATORY (INHALATION) at 00:39

## 2020-01-01 RX ADMIN — HUMAN INSULIN 8 UNITS: 100 INJECTION, SOLUTION SUBCUTANEOUS at 22:20

## 2020-01-01 RX ADMIN — ALBUTEROL SULFATE 2.5 MG: 2.5 SOLUTION RESPIRATORY (INHALATION) at 02:14

## 2020-01-01 RX ADMIN — MIDAZOLAM 1 MG: 1 INJECTION INTRAMUSCULAR; INTRAVENOUS at 15:30

## 2020-01-01 RX ADMIN — BUPROPION HYDROCHLORIDE 75 MG: 75 TABLET, FILM COATED ORAL at 17:05

## 2020-01-01 RX ADMIN — INSULIN LISPRO 6 UNITS: 100 INJECTION, SOLUTION INTRAVENOUS; SUBCUTANEOUS at 12:12

## 2020-01-01 RX ADMIN — TRAZODONE HYDROCHLORIDE 100 MG: 50 TABLET ORAL at 22:10

## 2020-01-01 RX ADMIN — WARFARIN SODIUM 5 MG: 5 TABLET ORAL at 17:02

## 2020-01-01 RX ADMIN — ALBUTEROL SULFATE 2.5 MG: 2.5 SOLUTION RESPIRATORY (INHALATION) at 22:10

## 2020-01-01 RX ADMIN — INSULIN LISPRO 15 UNITS: 100 INJECTION, SOLUTION INTRAVENOUS; SUBCUTANEOUS at 17:07

## 2020-01-01 RX ADMIN — ALBUTEROL SULFATE 2.5 MG: 2.5 SOLUTION RESPIRATORY (INHALATION) at 20:54

## 2020-01-01 RX ADMIN — Medication 2 SPRAY: at 18:24

## 2020-01-01 RX ADMIN — SODIUM CHLORIDE SOLN NEBU 3% 4 ML: 3 NEBU SOLN at 20:55

## 2020-01-01 RX ADMIN — FUROSEMIDE 80 MG: 10 INJECTION, SOLUTION INTRAMUSCULAR; INTRAVENOUS at 12:40

## 2020-01-01 RX ADMIN — MEROPENEM 500 MG: 500 INJECTION, POWDER, FOR SOLUTION INTRAVENOUS at 11:27

## 2020-01-01 RX ADMIN — TRAMADOL HYDROCHLORIDE 50 MG: 50 TABLET, FILM COATED ORAL at 08:33

## 2020-01-01 RX ADMIN — BUSPIRONE HYDROCHLORIDE 5 MG: 5 TABLET ORAL at 08:38

## 2020-01-01 RX ADMIN — ALBUTEROL SULFATE 2.5 MG: 2.5 SOLUTION RESPIRATORY (INHALATION) at 13:08

## 2020-01-01 RX ADMIN — SODIUM CHLORIDE 0.5 MCG/KG/MIN: 900 INJECTION, SOLUTION INTRAVENOUS at 15:55

## 2020-01-01 RX ADMIN — EPOETIN ALFA-EPBX 14000 UNITS: 10000 INJECTION, SOLUTION INTRAVENOUS; SUBCUTANEOUS at 10:39

## 2020-01-01 RX ADMIN — INSULIN LISPRO 12 UNITS: 100 INJECTION, SOLUTION INTRAVENOUS; SUBCUTANEOUS at 11:23

## 2020-01-01 RX ADMIN — DIPHENHYDRAMINE HYDROCHLORIDE 25 MG: 25 CAPSULE ORAL at 06:06

## 2020-01-01 RX ADMIN — Medication 5 ML: at 22:42

## 2020-01-01 RX ADMIN — IPRATROPIUM BROMIDE AND ALBUTEROL SULFATE 3 ML: .5; 3 SOLUTION RESPIRATORY (INHALATION) at 14:13

## 2020-01-01 RX ADMIN — INSULIN LISPRO 4 UNITS: 100 INJECTION, SOLUTION INTRAVENOUS; SUBCUTANEOUS at 23:08

## 2020-01-01 RX ADMIN — HUMAN INSULIN 2 UNITS: 100 INJECTION, SOLUTION SUBCUTANEOUS at 08:18

## 2020-01-01 RX ADMIN — INSULIN LISPRO 6 UNITS: 100 INJECTION, SOLUTION INTRAVENOUS; SUBCUTANEOUS at 15:57

## 2020-01-01 RX ADMIN — Medication 5 MG: at 08:37

## 2020-01-01 RX ADMIN — NITROGLYCERIN 1 INCH: 20 OINTMENT TOPICAL at 15:00

## 2020-01-01 RX ADMIN — TRAZODONE HYDROCHLORIDE 100 MG: 50 TABLET ORAL at 21:38

## 2020-01-01 RX ADMIN — FAMOTIDINE 20 MG: 20 TABLET, FILM COATED ORAL at 08:44

## 2020-01-01 RX ADMIN — AZTREONAM 2 G: 2 INJECTION, POWDER, LYOPHILIZED, FOR SOLUTION INTRAMUSCULAR; INTRAVENOUS at 21:31

## 2020-01-01 RX ADMIN — IPRATROPIUM BROMIDE AND ALBUTEROL SULFATE 3 ML: .5; 3 SOLUTION RESPIRATORY (INHALATION) at 21:45

## 2020-01-01 RX ADMIN — BUPROPION HYDROCHLORIDE 75 MG: 75 TABLET, FILM COATED ORAL at 17:18

## 2020-01-01 RX ADMIN — ATORVASTATIN CALCIUM 80 MG: 40 TABLET, FILM COATED ORAL at 20:26

## 2020-01-01 RX ADMIN — AMIODARONE HYDROCHLORIDE 400 MG: 200 TABLET ORAL at 08:39

## 2020-01-01 RX ADMIN — INSULIN LISPRO 10 UNITS: 100 INJECTION, SOLUTION INTRAVENOUS; SUBCUTANEOUS at 17:33

## 2020-01-01 RX ADMIN — ALBUTEROL SULFATE 2.5 MG: 2.5 SOLUTION RESPIRATORY (INHALATION) at 15:28

## 2020-01-01 RX ADMIN — ALBUTEROL SULFATE 2.5 MG: 2.5 SOLUTION RESPIRATORY (INHALATION) at 19:53

## 2020-01-01 RX ADMIN — GUAIFENESIN 1200 MG: 600 TABLET ORAL at 12:40

## 2020-01-01 RX ADMIN — VANCOMYCIN HYDROCHLORIDE 750 MG: 10 INJECTION, POWDER, LYOPHILIZED, FOR SOLUTION INTRAVENOUS at 20:37

## 2020-01-01 RX ADMIN — SODIUM CHLORIDE 0.5 MCG/KG/MIN: 900 INJECTION, SOLUTION INTRAVENOUS at 22:38

## 2020-01-01 RX ADMIN — Medication 10 ML: at 06:15

## 2020-01-01 RX ADMIN — BUPROPION HYDROCHLORIDE 75 MG: 75 TABLET, FILM COATED ORAL at 18:04

## 2020-01-01 RX ADMIN — GUAIFENESIN 1200 MG: 600 TABLET ORAL at 21:58

## 2020-01-01 RX ADMIN — MORPHINE SULFATE 3 MG: 10 INJECTION INTRAVENOUS at 09:58

## 2020-01-01 RX ADMIN — ALPRAZOLAM 0.25 MG: 0.25 TABLET ORAL at 13:26

## 2020-01-01 RX ADMIN — FUROSEMIDE 40 MG: 10 INJECTION, SOLUTION INTRAMUSCULAR; INTRAVENOUS at 17:51

## 2020-01-01 RX ADMIN — IPRATROPIUM BROMIDE AND ALBUTEROL SULFATE 3 ML: .5; 3 SOLUTION RESPIRATORY (INHALATION) at 19:53

## 2020-01-01 RX ADMIN — METHYLENE BLUE 150 MG: 5 INJECTION INTRAVENOUS at 10:40

## 2020-01-01 RX ADMIN — GUAIFENESIN 1200 MG: 600 TABLET ORAL at 12:16

## 2020-01-01 RX ADMIN — BUSPIRONE HYDROCHLORIDE 10 MG: 10 TABLET ORAL at 08:37

## 2020-01-01 RX ADMIN — SODIUM CHLORIDE 0.5 MCG/KG/MIN: 900 INJECTION, SOLUTION INTRAVENOUS at 06:10

## 2020-01-01 RX ADMIN — Medication 10 ML: at 21:53

## 2020-01-01 RX ADMIN — FAMOTIDINE 20 MG: 10 INJECTION INTRAVENOUS at 09:49

## 2020-01-01 RX ADMIN — VANCOMYCIN HYDROCHLORIDE 1250 G: 10 INJECTION, POWDER, LYOPHILIZED, FOR SOLUTION INTRAVENOUS at 13:11

## 2020-01-01 RX ADMIN — MIDODRINE HYDROCHLORIDE 10 MG: 5 TABLET ORAL at 11:18

## 2020-01-01 RX ADMIN — SODIUM CHLORIDE 125 MG: 900 INJECTION, SOLUTION INTRAVENOUS at 12:02

## 2020-01-01 RX ADMIN — VECURONIUM BROMIDE 5 MG: 1 INJECTION, POWDER, LYOPHILIZED, FOR SOLUTION INTRAVENOUS at 10:49

## 2020-01-01 RX ADMIN — MIDODRINE HYDROCHLORIDE 10 MG: 5 TABLET ORAL at 17:11

## 2020-01-01 RX ADMIN — ALPRAZOLAM 0.5 MG: 0.5 TABLET ORAL at 22:01

## 2020-01-01 RX ADMIN — MIDODRINE HYDROCHLORIDE 10 MG: 5 TABLET ORAL at 12:12

## 2020-01-01 RX ADMIN — INSULIN LISPRO 6 UNITS: 100 INJECTION, SOLUTION INTRAVENOUS; SUBCUTANEOUS at 12:09

## 2020-01-01 RX ADMIN — Medication 10 ML: at 00:04

## 2020-01-01 RX ADMIN — MIRTAZAPINE 15 MG: 15 TABLET, FILM COATED ORAL at 21:46

## 2020-01-01 RX ADMIN — CLINDAMYCIN IN 5 PERCENT DEXTROSE 600 MG: 12 INJECTION, SOLUTION INTRAVENOUS at 23:02

## 2020-01-01 RX ADMIN — VANCOMYCIN HYDROCHLORIDE 1000 MG: 1 INJECTION, POWDER, LYOPHILIZED, FOR SOLUTION INTRAVENOUS at 09:56

## 2020-01-01 RX ADMIN — FUROSEMIDE 40 MG: 10 INJECTION, SOLUTION INTRAMUSCULAR; INTRAVENOUS at 08:03

## 2020-01-01 RX ADMIN — MIDODRINE HYDROCHLORIDE 10 MG: 5 TABLET ORAL at 08:25

## 2020-01-01 RX ADMIN — INSULIN LISPRO 9 UNITS: 100 INJECTION, SOLUTION INTRAVENOUS; SUBCUTANEOUS at 21:23

## 2020-01-01 RX ADMIN — SODIUM CHLORIDE, SODIUM LACTATE, POTASSIUM CHLORIDE, AND CALCIUM CHLORIDE 100 ML/HR: 600; 310; 30; 20 INJECTION, SOLUTION INTRAVENOUS at 05:57

## 2020-01-01 RX ADMIN — INSULIN GLARGINE 18 UNITS: 100 INJECTION, SOLUTION SUBCUTANEOUS at 22:24

## 2020-01-01 RX ADMIN — TAMSULOSIN HYDROCHLORIDE 0.4 MG: 0.4 CAPSULE ORAL at 22:15

## 2020-01-01 RX ADMIN — DEXMEDETOMIDINE 0.6 MCG/KG/HR: 100 INJECTION, SOLUTION, CONCENTRATE INTRAVENOUS at 13:56

## 2020-01-01 RX ADMIN — GUAIFENESIN 1200 MG: 600 TABLET ORAL at 22:15

## 2020-01-01 RX ADMIN — BUSPIRONE HYDROCHLORIDE 10 MG: 10 TABLET ORAL at 08:09

## 2020-01-01 RX ADMIN — CHLORHEXIDINE GLUCONATE 10 ML: 1.2 RINSE ORAL at 09:29

## 2020-01-01 RX ADMIN — Medication: at 08:39

## 2020-01-01 RX ADMIN — SERTRALINE HYDROCHLORIDE 100 MG: 100 TABLET ORAL at 10:02

## 2020-01-01 RX ADMIN — MIRTAZAPINE 15 MG: 15 TABLET, FILM COATED ORAL at 22:47

## 2020-01-01 RX ADMIN — IPRATROPIUM BROMIDE AND ALBUTEROL SULFATE 3 ML: .5; 3 SOLUTION RESPIRATORY (INHALATION) at 19:20

## 2020-01-01 RX ADMIN — Medication: at 09:25

## 2020-01-01 RX ADMIN — INSULIN GLARGINE 18 UNITS: 100 INJECTION, SOLUTION SUBCUTANEOUS at 21:30

## 2020-01-01 RX ADMIN — NITROGLYCERIN 1 INCH: 20 OINTMENT TOPICAL at 15:43

## 2020-01-01 RX ADMIN — INSULIN GLARGINE 45 UNITS: 100 INJECTION, SOLUTION SUBCUTANEOUS at 21:57

## 2020-01-01 RX ADMIN — MIDODRINE HYDROCHLORIDE 10 MG: 5 TABLET ORAL at 17:06

## 2020-01-01 RX ADMIN — SODIUM CHLORIDE 0.5 MCG/KG/MIN: 900 INJECTION, SOLUTION INTRAVENOUS at 03:12

## 2020-01-01 RX ADMIN — MUPIROCIN: 20 OINTMENT TOPICAL at 17:27

## 2020-01-01 RX ADMIN — SODIUM CHLORIDE 0.5 MCG/KG/MIN: 900 INJECTION, SOLUTION INTRAVENOUS at 11:23

## 2020-01-01 RX ADMIN — ATORVASTATIN CALCIUM 80 MG: 40 TABLET, FILM COATED ORAL at 21:49

## 2020-01-01 RX ADMIN — SODIUM CHLORIDE 0.5 MCG/KG/MIN: 900 INJECTION, SOLUTION INTRAVENOUS at 05:39

## 2020-01-01 RX ADMIN — ATORVASTATIN CALCIUM 40 MG: 40 TABLET, FILM COATED ORAL at 21:30

## 2020-01-01 RX ADMIN — HEPARIN SODIUM 2000 UNITS: 1000 INJECTION INTRAVENOUS; SUBCUTANEOUS at 20:17

## 2020-01-01 RX ADMIN — CEFEPIME HYDROCHLORIDE 2 G: 2 INJECTION, POWDER, FOR SOLUTION INTRAVENOUS at 21:19

## 2020-01-01 RX ADMIN — BUSPIRONE HYDROCHLORIDE 10 MG: 10 TABLET ORAL at 15:57

## 2020-01-01 RX ADMIN — SODIUM CHLORIDE 125 MG: 900 INJECTION, SOLUTION INTRAVENOUS at 16:47

## 2020-01-01 RX ADMIN — NOREPINEPHRINE BITARTRATE 8 MCG: 1 INJECTION, SOLUTION, CONCENTRATE INTRAVENOUS at 13:00

## 2020-01-01 RX ADMIN — SERTRALINE HYDROCHLORIDE 100 MG: 50 TABLET ORAL at 08:55

## 2020-01-01 RX ADMIN — POTASSIUM CHLORIDE 10 MEQ: 750 TABLET, EXTENDED RELEASE ORAL at 09:17

## 2020-01-01 RX ADMIN — MIRTAZAPINE 15 MG: 15 TABLET, FILM COATED ORAL at 21:14

## 2020-01-01 RX ADMIN — ALBUTEROL SULFATE 2.5 MG: 2.5 SOLUTION RESPIRATORY (INHALATION) at 19:15

## 2020-01-01 RX ADMIN — AMIODARONE HYDROCHLORIDE 400 MG: 200 TABLET ORAL at 20:59

## 2020-01-01 RX ADMIN — BUPROPION HYDROCHLORIDE 75 MG: 75 TABLET, FILM COATED ORAL at 08:32

## 2020-01-01 RX ADMIN — DIPHENHYDRAMINE HYDROCHLORIDE 25 MG: 25 CAPSULE ORAL at 08:32

## 2020-01-01 RX ADMIN — OLANZAPINE 5 MG: 5 TABLET, FILM COATED ORAL at 20:51

## 2020-01-01 RX ADMIN — ATORVASTATIN CALCIUM 80 MG: 40 TABLET, FILM COATED ORAL at 22:16

## 2020-01-01 RX ADMIN — SODIUM CHLORIDE 1000 ML: 900 INJECTION, SOLUTION INTRAVENOUS at 16:54

## 2020-01-01 RX ADMIN — BACITRACIN: 500 OINTMENT TOPICAL at 17:01

## 2020-01-01 RX ADMIN — CLINDAMYCIN IN 5 PERCENT DEXTROSE 600 MG: 12 INJECTION, SOLUTION INTRAVENOUS at 21:18

## 2020-01-01 RX ADMIN — FAMOTIDINE 20 MG: 10 INJECTION INTRAVENOUS at 09:24

## 2020-01-01 RX ADMIN — ALBUTEROL SULFATE 2.5 MG: 2.5 SOLUTION RESPIRATORY (INHALATION) at 13:47

## 2020-01-01 RX ADMIN — Medication 5 ML: at 13:44

## 2020-01-01 RX ADMIN — Medication 10 ML: at 14:18

## 2020-01-01 RX ADMIN — PHENYLEPHRINE HYDROCHLORIDE 120 MCG: 10 INJECTION INTRAVENOUS at 10:05

## 2020-01-01 RX ADMIN — BUSPIRONE HYDROCHLORIDE 10 MG: 10 TABLET ORAL at 08:55

## 2020-01-01 RX ADMIN — BUPROPION HYDROCHLORIDE 75 MG: 75 TABLET, FILM COATED ORAL at 17:26

## 2020-01-01 RX ADMIN — ALBUTEROL SULFATE 2.5 MG: 2.5 SOLUTION RESPIRATORY (INHALATION) at 08:43

## 2020-01-01 RX ADMIN — INSULIN LISPRO 2 UNITS: 100 INJECTION, SOLUTION INTRAVENOUS; SUBCUTANEOUS at 00:17

## 2020-01-01 RX ADMIN — GUAIFENESIN 1200 MG: 600 TABLET ORAL at 21:52

## 2020-01-01 RX ADMIN — Medication 30 ML: at 14:32

## 2020-01-01 RX ADMIN — SODIUM CHLORIDE 4 MG/HR: 900 INJECTION, SOLUTION INTRAVENOUS at 04:26

## 2020-01-01 RX ADMIN — INSULIN LISPRO 8 UNITS: 100 INJECTION, SOLUTION INTRAVENOUS; SUBCUTANEOUS at 21:07

## 2020-01-01 RX ADMIN — MEROPENEM 500 MG: 500 INJECTION, POWDER, FOR SOLUTION INTRAVENOUS at 21:18

## 2020-01-01 RX ADMIN — MIDODRINE HYDROCHLORIDE 10 MG: 5 TABLET ORAL at 12:22

## 2020-01-01 RX ADMIN — HEPARIN SODIUM 2000 UNITS: 1000 INJECTION INTRAVENOUS; SUBCUTANEOUS at 02:18

## 2020-01-01 RX ADMIN — OLANZAPINE 5 MG: 5 TABLET, FILM COATED ORAL at 22:19

## 2020-01-01 RX ADMIN — MIDODRINE HYDROCHLORIDE 10 MG: 5 TABLET ORAL at 08:23

## 2020-01-01 RX ADMIN — Medication 10 ML: at 05:58

## 2020-01-01 RX ADMIN — SODIUM CHLORIDE SOLN NEBU 3% 4 ML: 3 NEBU SOLN at 20:04

## 2020-01-01 RX ADMIN — Medication 10 ML: at 20:53

## 2020-01-01 RX ADMIN — DEXMEDETOMIDINE 0.7 MCG/KG/HR: 100 INJECTION, SOLUTION, CONCENTRATE INTRAVENOUS at 20:31

## 2020-01-01 RX ADMIN — Medication 125 MCG/HR: at 17:27

## 2020-01-01 RX ADMIN — FUROSEMIDE 20 MG/HR: 10 INJECTION, SOLUTION INTRAMUSCULAR; INTRAVENOUS at 20:15

## 2020-01-01 RX ADMIN — AMIODARONE HYDROCHLORIDE 400 MG: 200 TABLET ORAL at 09:28

## 2020-01-01 RX ADMIN — CHLORHEXIDINE GLUCONATE 10 ML: 1.2 RINSE ORAL at 18:26

## 2020-01-01 RX ADMIN — MIDODRINE HYDROCHLORIDE 10 MG: 5 TABLET ORAL at 08:38

## 2020-01-01 RX ADMIN — FUROSEMIDE 40 MG: 10 INJECTION, SOLUTION INTRAMUSCULAR; INTRAVENOUS at 08:37

## 2020-01-01 RX ADMIN — MEROPENEM 500 MG: 500 INJECTION, POWDER, FOR SOLUTION INTRAVENOUS at 21:00

## 2020-01-01 RX ADMIN — MIDODRINE HYDROCHLORIDE 10 MG: 5 TABLET ORAL at 16:22

## 2020-01-01 RX ADMIN — FUROSEMIDE 20 MG/HR: 10 INJECTION, SOLUTION INTRAMUSCULAR; INTRAVENOUS at 10:59

## 2020-01-01 RX ADMIN — INSULIN LISPRO 4 UNITS: 100 INJECTION, SOLUTION INTRAVENOUS; SUBCUTANEOUS at 21:18

## 2020-01-01 RX ADMIN — NITROGLYCERIN 1 INCH: 20 OINTMENT TOPICAL at 16:08

## 2020-01-01 RX ADMIN — INSULIN LISPRO 4 UNITS: 100 INJECTION, SOLUTION INTRAVENOUS; SUBCUTANEOUS at 17:47

## 2020-01-01 RX ADMIN — AMIODARONE HYDROCHLORIDE 400 MG: 200 TABLET ORAL at 09:50

## 2020-01-01 RX ADMIN — SODIUM POLYSTYRENE SULFONATE 15 G: 15 SUSPENSION ORAL; RECTAL at 21:05

## 2020-01-01 RX ADMIN — INSULIN LISPRO 10 UNITS: 100 INJECTION, SOLUTION INTRAVENOUS; SUBCUTANEOUS at 09:08

## 2020-01-01 RX ADMIN — OLANZAPINE 5 MG: 5 TABLET, FILM COATED ORAL at 22:28

## 2020-01-01 RX ADMIN — Medication: at 17:34

## 2020-01-01 RX ADMIN — Medication 2 SPRAY: at 09:45

## 2020-01-01 RX ADMIN — TAMSULOSIN HYDROCHLORIDE 0.4 MG: 0.4 CAPSULE ORAL at 21:51

## 2020-01-01 RX ADMIN — DEXTROSE MONOHYDRATE 150 MG: 5 INJECTION, SOLUTION INTRAVENOUS at 18:25

## 2020-01-01 RX ADMIN — TAMSULOSIN HYDROCHLORIDE 0.4 MG: 0.4 CAPSULE ORAL at 21:17

## 2020-01-01 RX ADMIN — ACETAMINOPHEN 650 MG: 325 SOLUTION ORAL at 17:36

## 2020-01-01 RX ADMIN — Medication 125 MCG/HR: at 11:55

## 2020-01-01 RX ADMIN — BUPROPION HYDROCHLORIDE 75 MG: 75 TABLET, FILM COATED ORAL at 10:02

## 2020-01-01 RX ADMIN — MIDODRINE HYDROCHLORIDE 10 MG: 5 TABLET ORAL at 17:38

## 2020-01-01 RX ADMIN — SERTRALINE HYDROCHLORIDE 25 MG: 50 TABLET ORAL at 09:51

## 2020-01-01 RX ADMIN — IPRATROPIUM BROMIDE AND ALBUTEROL SULFATE 3 ML: .5; 3 SOLUTION RESPIRATORY (INHALATION) at 12:21

## 2020-01-01 RX ADMIN — ALBUTEROL SULFATE 2.5 MG: 2.5 SOLUTION RESPIRATORY (INHALATION) at 19:54

## 2020-01-01 RX ADMIN — CHLORHEXIDINE GLUCONATE 10 ML: 1.2 RINSE ORAL at 19:59

## 2020-01-01 RX ADMIN — EPINEPHRINE 0.03 MCG/KG/MIN: 1 INJECTION PARENTERAL at 00:15

## 2020-01-01 RX ADMIN — TAMSULOSIN HYDROCHLORIDE 0.4 MG: 0.4 CAPSULE ORAL at 21:50

## 2020-01-01 RX ADMIN — INSULIN LISPRO 9 UNITS: 100 INJECTION, SOLUTION INTRAVENOUS; SUBCUTANEOUS at 11:17

## 2020-01-01 RX ADMIN — ALPRAZOLAM 0.5 MG: 0.5 TABLET ORAL at 21:29

## 2020-01-01 RX ADMIN — ALBUTEROL SULFATE 2.5 MG: 2.5 SOLUTION RESPIRATORY (INHALATION) at 11:46

## 2020-01-01 RX ADMIN — SODIUM CHLORIDE 3 UNITS/HR: 900 INJECTION, SOLUTION INTRAVENOUS at 23:08

## 2020-01-01 RX ADMIN — DIPHENHYDRAMINE HYDROCHLORIDE 25 MG: 25 CAPSULE ORAL at 01:44

## 2020-01-01 RX ADMIN — BUSPIRONE HYDROCHLORIDE 10 MG: 10 TABLET ORAL at 20:59

## 2020-01-01 RX ADMIN — BACITRACIN: 500 OINTMENT TOPICAL at 17:07

## 2020-01-01 RX ADMIN — NITROGLYCERIN 1 INCH: 20 OINTMENT TOPICAL at 21:31

## 2020-01-01 RX ADMIN — INSULIN LISPRO 3 UNITS: 100 INJECTION, SOLUTION INTRAVENOUS; SUBCUTANEOUS at 06:13

## 2020-01-01 RX ADMIN — INSULIN LISPRO 6 UNITS: 100 INJECTION, SOLUTION INTRAVENOUS; SUBCUTANEOUS at 12:10

## 2020-01-01 RX ADMIN — POLYETHYLENE GLYCOL 3350 17 G: 17 POWDER, FOR SOLUTION ORAL at 12:33

## 2020-01-01 RX ADMIN — ESCITALOPRAM OXALATE 10 MG: 10 TABLET ORAL at 15:38

## 2020-01-01 RX ADMIN — GUAIFENESIN 1200 MG: 600 TABLET ORAL at 21:23

## 2020-01-01 RX ADMIN — Medication 10 ML: at 04:23

## 2020-01-01 RX ADMIN — FAMOTIDINE 20 MG: 10 INJECTION INTRAVENOUS at 08:31

## 2020-01-01 RX ADMIN — BUSPIRONE HYDROCHLORIDE 10 MG: 5 TABLET ORAL at 12:21

## 2020-01-01 RX ADMIN — INSULIN GLARGINE 5 UNITS: 100 INJECTION, SOLUTION SUBCUTANEOUS at 21:58

## 2020-01-01 RX ADMIN — HEPARIN SODIUM 2000 UNITS: 1000 INJECTION INTRAVENOUS; SUBCUTANEOUS at 18:41

## 2020-01-01 RX ADMIN — INSULIN LISPRO 4 UNITS: 100 INJECTION, SOLUTION INTRAVENOUS; SUBCUTANEOUS at 18:16

## 2020-01-01 RX ADMIN — Medication: at 10:10

## 2020-01-01 RX ADMIN — NOREPINEPHRINE BITARTRATE 0.18 MCG/KG/MIN: 1 INJECTION, SOLUTION, CONCENTRATE INTRAVENOUS at 16:16

## 2020-01-01 RX ADMIN — MIDODRINE HYDROCHLORIDE 10 MG: 5 TABLET ORAL at 10:06

## 2020-01-01 RX ADMIN — ALBUMIN (HUMAN) 12.5 G: 0.25 INJECTION, SOLUTION INTRAVENOUS at 17:24

## 2020-01-01 RX ADMIN — Medication 10 ML: at 21:35

## 2020-01-01 RX ADMIN — INSULIN LISPRO 3 UNITS: 100 INJECTION, SOLUTION INTRAVENOUS; SUBCUTANEOUS at 16:15

## 2020-01-01 RX ADMIN — INSULIN LISPRO 15 UNITS: 100 INJECTION, SOLUTION INTRAVENOUS; SUBCUTANEOUS at 22:15

## 2020-01-01 RX ADMIN — TRAMADOL HYDROCHLORIDE 50 MG: 50 TABLET, FILM COATED ORAL at 16:42

## 2020-01-01 RX ADMIN — SODIUM CHLORIDE 125 MG: 900 INJECTION, SOLUTION INTRAVENOUS at 11:07

## 2020-01-01 RX ADMIN — EPOETIN ALFA-EPBX 14000 UNITS: 10000 INJECTION, SOLUTION INTRAVENOUS; SUBCUTANEOUS at 12:39

## 2020-01-01 RX ADMIN — ALPRAZOLAM 0.25 MG: 0.25 TABLET ORAL at 21:07

## 2020-01-01 RX ADMIN — Medication 10 ML: at 20:21

## 2020-01-01 RX ADMIN — MEROPENEM 500 MG: 500 INJECTION, POWDER, FOR SOLUTION INTRAVENOUS at 12:14

## 2020-01-01 RX ADMIN — BUSPIRONE HYDROCHLORIDE 10 MG: 10 TABLET ORAL at 12:04

## 2020-01-01 RX ADMIN — SODIUM CHLORIDE 0.5 MCG/KG/MIN: 900 INJECTION, SOLUTION INTRAVENOUS at 07:05

## 2020-01-01 RX ADMIN — Medication 2 SPRAY: at 17:02

## 2020-01-01 RX ADMIN — MIDODRINE HYDROCHLORIDE 10 MG: 5 TABLET ORAL at 12:43

## 2020-01-01 RX ADMIN — SODIUM CHLORIDE 0.5 MCG/KG/MIN: 900 INJECTION, SOLUTION INTRAVENOUS at 18:29

## 2020-01-01 RX ADMIN — NITROGLYCERIN 1 INCH: 20 OINTMENT TOPICAL at 16:22

## 2020-01-01 RX ADMIN — MIRTAZAPINE 7.5 MG: 15 TABLET, FILM COATED ORAL at 21:28

## 2020-01-01 RX ADMIN — ALBUMIN (HUMAN) 12.5 G: 0.25 INJECTION, SOLUTION INTRAVENOUS at 17:46

## 2020-01-01 RX ADMIN — FENTANYL CITRATE 50 MCG: 50 INJECTION, SOLUTION INTRAMUSCULAR; INTRAVENOUS at 12:10

## 2020-01-01 RX ADMIN — BUSPIRONE HYDROCHLORIDE 10 MG: 10 TABLET ORAL at 20:23

## 2020-01-01 RX ADMIN — LORATADINE 10 MG: 10 TABLET ORAL at 10:02

## 2020-01-01 RX ADMIN — Medication 10 ML: at 06:32

## 2020-01-01 RX ADMIN — MIDODRINE HYDROCHLORIDE 10 MG: 5 TABLET ORAL at 09:07

## 2020-01-01 RX ADMIN — MIRTAZAPINE 15 MG: 15 TABLET, FILM COATED ORAL at 21:58

## 2020-01-01 RX ADMIN — MORPHINE SULFATE 3 MG: 10 INJECTION INTRAVENOUS at 02:53

## 2020-01-01 RX ADMIN — HUMAN INSULIN 8 UNITS: 100 INJECTION, SOLUTION SUBCUTANEOUS at 12:05

## 2020-01-01 RX ADMIN — SODIUM CHLORIDE 0.5 MCG/KG/MIN: 900 INJECTION, SOLUTION INTRAVENOUS at 18:00

## 2020-01-01 RX ADMIN — ALTEPLASE 1 MG: KIT at 14:26

## 2020-01-01 RX ADMIN — ALBUTEROL SULFATE 2.5 MG: 2.5 SOLUTION RESPIRATORY (INHALATION) at 11:03

## 2020-01-01 RX ADMIN — INSULIN LISPRO 6 UNITS: 100 INJECTION, SOLUTION INTRAVENOUS; SUBCUTANEOUS at 21:41

## 2020-01-01 RX ADMIN — Medication: at 09:43

## 2020-01-01 RX ADMIN — AMIODARONE HYDROCHLORIDE 400 MG: 200 TABLET ORAL at 21:31

## 2020-01-01 RX ADMIN — BUPROPION HYDROCHLORIDE 75 MG: 75 TABLET, FILM COATED ORAL at 09:22

## 2020-01-01 RX ADMIN — PHENYLEPHRINE HYDROCHLORIDE 120 MCG: 10 INJECTION INTRAVENOUS at 13:00

## 2020-01-01 RX ADMIN — BUSPIRONE HYDROCHLORIDE 10 MG: 10 TABLET ORAL at 17:59

## 2020-01-01 RX ADMIN — SODIUM CHLORIDE 0.5 MCG/KG/MIN: 900 INJECTION, SOLUTION INTRAVENOUS at 17:08

## 2020-01-01 RX ADMIN — Medication: at 08:31

## 2020-01-01 RX ADMIN — ALPRAZOLAM 0.25 MG: 0.25 TABLET ORAL at 20:20

## 2020-01-01 RX ADMIN — BUSPIRONE HYDROCHLORIDE 10 MG: 10 TABLET ORAL at 22:17

## 2020-01-01 RX ADMIN — ATORVASTATIN CALCIUM 80 MG: 40 TABLET, FILM COATED ORAL at 21:06

## 2020-01-01 RX ADMIN — ACETAMINOPHEN 650 MG: 325 SOLUTION ORAL at 18:00

## 2020-01-01 RX ADMIN — FAMOTIDINE 20 MG: 40 POWDER, FOR SUSPENSION ORAL at 10:26

## 2020-01-01 RX ADMIN — ATORVASTATIN CALCIUM 80 MG: 40 TABLET, FILM COATED ORAL at 21:17

## 2020-01-01 RX ADMIN — Medication 10 ML: at 21:45

## 2020-01-01 RX ADMIN — Medication: at 08:42

## 2020-01-01 RX ADMIN — Medication 20 ML: at 05:30

## 2020-01-01 RX ADMIN — ROCURONIUM BROMIDE 5 MG: 10 INJECTION, SOLUTION INTRAVENOUS at 07:44

## 2020-01-01 RX ADMIN — ACETAMINOPHEN 650 MG: 325 TABLET, FILM COATED ORAL at 23:28

## 2020-01-01 RX ADMIN — INSULIN GLARGINE 20 UNITS: 100 INJECTION, SOLUTION SUBCUTANEOUS at 22:48

## 2020-01-01 RX ADMIN — Medication 2 SPRAY: at 17:37

## 2020-01-01 RX ADMIN — WARFARIN SODIUM 6 MG: 5 TABLET ORAL at 17:26

## 2020-01-01 RX ADMIN — MIRTAZAPINE 7.5 MG: 15 TABLET, FILM COATED ORAL at 21:18

## 2020-01-01 RX ADMIN — INSULIN GLARGINE 28 UNITS: 100 INJECTION, SOLUTION SUBCUTANEOUS at 08:52

## 2020-01-01 RX ADMIN — HUMAN INSULIN 6 UNITS: 100 INJECTION, SOLUTION SUBCUTANEOUS at 11:57

## 2020-01-01 RX ADMIN — Medication 5 ML: at 15:20

## 2020-01-01 RX ADMIN — SODIUM CHLORIDE 4 MG/HR: 900 INJECTION, SOLUTION INTRAVENOUS at 00:20

## 2020-01-01 RX ADMIN — ALBUMIN (HUMAN) 12.5 G: 0.25 INJECTION, SOLUTION INTRAVENOUS at 05:18

## 2020-01-01 RX ADMIN — FAMOTIDINE 20 MG: 10 INJECTION INTRAVENOUS at 09:25

## 2020-01-01 RX ADMIN — INSULIN LISPRO 3 UNITS: 100 INJECTION, SOLUTION INTRAVENOUS; SUBCUTANEOUS at 12:46

## 2020-01-01 RX ADMIN — Medication 10 ML: at 20:55

## 2020-01-01 RX ADMIN — Medication 125 MCG/HR: at 03:57

## 2020-01-01 RX ADMIN — SODIUM CHLORIDE 2 MG/HR: 900 INJECTION, SOLUTION INTRAVENOUS at 18:39

## 2020-01-01 RX ADMIN — METRONIDAZOLE 500 MG: 500 INJECTION, SOLUTION INTRAVENOUS at 20:58

## 2020-01-01 RX ADMIN — ALBUTEROL SULFATE 2.5 MG: 2.5 SOLUTION RESPIRATORY (INHALATION) at 13:06

## 2020-01-01 RX ADMIN — Medication 10 ML: at 22:15

## 2020-01-01 RX ADMIN — Medication 10 ML: at 17:40

## 2020-01-01 RX ADMIN — Medication 1 AMPULE: at 08:39

## 2020-01-01 RX ADMIN — Medication 10 MG: at 11:18

## 2020-01-01 RX ADMIN — IPRATROPIUM BROMIDE AND ALBUTEROL SULFATE 3 ML: .5; 3 SOLUTION RESPIRATORY (INHALATION) at 07:57

## 2020-01-01 RX ADMIN — NOREPINEPHRINE BITARTRATE 0.15 MCG/KG/MIN: 1 INJECTION INTRAVENOUS at 15:21

## 2020-01-01 RX ADMIN — INSULIN LISPRO 8 UNITS: 100 INJECTION, SOLUTION INTRAVENOUS; SUBCUTANEOUS at 17:46

## 2020-01-01 RX ADMIN — INSULIN LISPRO 6 UNITS: 100 INJECTION, SOLUTION INTRAVENOUS; SUBCUTANEOUS at 17:05

## 2020-01-01 RX ADMIN — Medication 10 ML: at 21:38

## 2020-01-01 RX ADMIN — INSULIN GLARGINE 18 UNITS: 100 INJECTION, SOLUTION SUBCUTANEOUS at 23:08

## 2020-01-01 RX ADMIN — OLANZAPINE 5 MG: 5 TABLET, FILM COATED ORAL at 21:09

## 2020-01-01 RX ADMIN — TAMSULOSIN HYDROCHLORIDE 0.4 MG: 0.4 CAPSULE ORAL at 22:07

## 2020-01-01 RX ADMIN — POLYETHYLENE GLYCOL 3350 17 G: 17 POWDER, FOR SOLUTION ORAL at 21:34

## 2020-01-01 RX ADMIN — MIDODRINE HYDROCHLORIDE 10 MG: 5 TABLET ORAL at 09:09

## 2020-01-01 RX ADMIN — INSULIN GLARGINE 28 UNITS: 100 INJECTION, SOLUTION SUBCUTANEOUS at 08:19

## 2020-01-01 RX ADMIN — VASOPRESSIN 0.05 UNITS/MIN: 20 INJECTION INTRAVENOUS at 16:18

## 2020-01-01 RX ADMIN — SODIUM CHLORIDE 0.5 MCG/KG/MIN: 900 INJECTION, SOLUTION INTRAVENOUS at 20:42

## 2020-01-01 RX ADMIN — INSULIN LISPRO 3 UNITS: 100 INJECTION, SOLUTION INTRAVENOUS; SUBCUTANEOUS at 06:11

## 2020-01-01 RX ADMIN — ALBUTEROL SULFATE 2.5 MG: 2.5 SOLUTION RESPIRATORY (INHALATION) at 08:55

## 2020-01-01 RX ADMIN — MORPHINE SULFATE 5 MG: 10 INJECTION INTRAVENOUS at 22:22

## 2020-01-01 RX ADMIN — INSULIN GLARGINE 20 UNITS: 100 INJECTION, SOLUTION SUBCUTANEOUS at 08:42

## 2020-01-01 RX ADMIN — HUMAN INSULIN 4 UNITS: 100 INJECTION, SOLUTION SUBCUTANEOUS at 17:29

## 2020-01-01 RX ADMIN — TRIAMCINOLONE ACETONIDE: 1 CREAM TOPICAL at 09:00

## 2020-01-01 RX ADMIN — Medication 10 ML: at 14:52

## 2020-01-01 RX ADMIN — HYDROCODONE BITARTRATE AND HOMATROPINE METHYLBROMIDE 5 ML: 5; 1.5 SOLUTION ORAL at 10:28

## 2020-01-01 RX ADMIN — INSULIN LISPRO 9 UNITS: 100 INJECTION, SOLUTION INTRAVENOUS; SUBCUTANEOUS at 17:36

## 2020-01-01 RX ADMIN — INSULIN LISPRO 6 UNITS: 100 INJECTION, SOLUTION INTRAVENOUS; SUBCUTANEOUS at 12:03

## 2020-01-01 RX ADMIN — ALPRAZOLAM 0.5 MG: 0.5 TABLET ORAL at 07:31

## 2020-01-01 RX ADMIN — ALPRAZOLAM 0.5 MG: 0.5 TABLET ORAL at 17:32

## 2020-01-01 RX ADMIN — INSULIN LISPRO 4 UNITS: 100 INJECTION, SOLUTION INTRAVENOUS; SUBCUTANEOUS at 12:03

## 2020-01-01 RX ADMIN — INSULIN LISPRO 9 UNITS: 100 INJECTION, SOLUTION INTRAVENOUS; SUBCUTANEOUS at 17:06

## 2020-01-01 RX ADMIN — Medication 10 ML: at 21:32

## 2020-01-01 RX ADMIN — BUSPIRONE HYDROCHLORIDE 10 MG: 5 TABLET ORAL at 08:18

## 2020-01-01 RX ADMIN — ALBUMIN (HUMAN) 12.5 G: 0.25 INJECTION, SOLUTION INTRAVENOUS at 12:27

## 2020-01-01 RX ADMIN — Medication 10 ML: at 05:03

## 2020-01-01 RX ADMIN — SERTRALINE HYDROCHLORIDE 100 MG: 100 TABLET ORAL at 09:17

## 2020-01-01 RX ADMIN — ALBUTEROL SULFATE 2.5 MG: 2.5 SOLUTION RESPIRATORY (INHALATION) at 19:34

## 2020-01-01 RX ADMIN — DEXMEDETOMIDINE 0.7 MCG/KG/HR: 100 INJECTION, SOLUTION, CONCENTRATE INTRAVENOUS at 19:05

## 2020-01-01 RX ADMIN — SODIUM CHLORIDE 0.5 MCG/KG/MIN: 900 INJECTION, SOLUTION INTRAVENOUS at 20:49

## 2020-01-01 RX ADMIN — SODIUM CHLORIDE 0.5 MCG/KG/MIN: 900 INJECTION, SOLUTION INTRAVENOUS at 18:51

## 2020-01-01 RX ADMIN — IPRATROPIUM BROMIDE AND ALBUTEROL SULFATE 3 ML: .5; 3 SOLUTION RESPIRATORY (INHALATION) at 14:03

## 2020-01-01 RX ADMIN — MUPIROCIN: 20 OINTMENT TOPICAL at 09:24

## 2020-01-01 RX ADMIN — TAMSULOSIN HYDROCHLORIDE 0.4 MG: 0.4 CAPSULE ORAL at 21:28

## 2020-01-01 RX ADMIN — Medication: at 12:42

## 2020-01-01 RX ADMIN — SERTRALINE HYDROCHLORIDE 100 MG: 50 TABLET ORAL at 08:58

## 2020-01-01 RX ADMIN — SODIUM CHLORIDE SOLN NEBU 3% 4 ML: 3 NEBU SOLN at 08:29

## 2020-01-01 RX ADMIN — Medication 10 ML: at 15:14

## 2020-01-01 RX ADMIN — SODIUM CHLORIDE 50 ML/HR: 900 INJECTION, SOLUTION INTRAVENOUS at 19:02

## 2020-01-01 RX ADMIN — MEROPENEM 500 MG: 500 INJECTION, POWDER, FOR SOLUTION INTRAVENOUS at 21:09

## 2020-01-01 RX ADMIN — VASOPRESSIN 0.01 UNITS/MIN: 20 INJECTION INTRAVENOUS at 12:44

## 2020-01-01 RX ADMIN — WARFARIN SODIUM 2.5 MG: 2.5 TABLET ORAL at 18:10

## 2020-01-01 RX ADMIN — Medication 2 G: at 10:59

## 2020-01-01 RX ADMIN — Medication 10 ML: at 21:19

## 2020-01-01 RX ADMIN — SODIUM CHLORIDE 0.25 MCG/KG/MIN: 900 INJECTION, SOLUTION INTRAVENOUS at 01:37

## 2020-01-01 RX ADMIN — SODIUM CHLORIDE SOLN NEBU 3% 4 ML: 3 NEBU SOLN at 21:14

## 2020-01-01 RX ADMIN — INSULIN HUMAN 10 UNITS: 100 INJECTION, SOLUTION PARENTERAL at 21:59

## 2020-01-01 RX ADMIN — DIPHENHYDRAMINE HYDROCHLORIDE 25 MG: 25 CAPSULE ORAL at 04:58

## 2020-01-01 RX ADMIN — Medication 10 ML: at 21:07

## 2020-01-01 RX ADMIN — BUSPIRONE HYDROCHLORIDE 10 MG: 10 TABLET ORAL at 08:39

## 2020-01-01 RX ADMIN — INSULIN LISPRO 2 UNITS: 100 INJECTION, SOLUTION INTRAVENOUS; SUBCUTANEOUS at 08:36

## 2020-01-01 RX ADMIN — PHENYLEPHRINE HYDROCHLORIDE 120 MCG: 10 INJECTION INTRAVENOUS at 08:23

## 2020-01-01 RX ADMIN — MORPHINE SULFATE 4 MG: 10 INJECTION INTRAVENOUS at 17:50

## 2020-01-01 RX ADMIN — AMIODARONE HYDROCHLORIDE 0.5 MG/MIN: 50 INJECTION, SOLUTION INTRAVENOUS at 19:45

## 2020-01-01 RX ADMIN — INSULIN GLARGINE 15 UNITS: 100 INJECTION, SOLUTION SUBCUTANEOUS at 21:45

## 2020-01-01 RX ADMIN — SODIUM CHLORIDE 4 MG/HR: 900 INJECTION, SOLUTION INTRAVENOUS at 01:39

## 2020-01-01 RX ADMIN — SODIUM CHLORIDE 0.5 MCG/KG/MIN: 900 INJECTION, SOLUTION INTRAVENOUS at 21:40

## 2020-01-01 RX ADMIN — ACETAMINOPHEN 650 MG: 325 TABLET, FILM COATED ORAL at 20:06

## 2020-01-01 RX ADMIN — DAKIN'S SOLUTION 0.125% (QUARTER STRENGTH): 0.12 SOLUTION at 14:18

## 2020-01-01 RX ADMIN — MEROPENEM 500 MG: 500 INJECTION, POWDER, FOR SOLUTION INTRAVENOUS at 04:23

## 2020-01-01 RX ADMIN — MIDAZOLAM 1 MG: 1 INJECTION INTRAMUSCULAR; INTRAVENOUS at 19:37

## 2020-01-01 RX ADMIN — BACITRACIN: 500 OINTMENT TOPICAL at 12:39

## 2020-01-01 RX ADMIN — MORPHINE SULFATE 2 MG: 2 INJECTION, SOLUTION INTRAMUSCULAR; INTRAVENOUS at 10:45

## 2020-01-01 RX ADMIN — SODIUM CHLORIDE SOLN NEBU 3% 4 ML: 3 NEBU SOLN at 22:21

## 2020-01-01 RX ADMIN — EPINEPHRINE 0.05 MCG/KG/MIN: 1 INJECTION PARENTERAL at 22:42

## 2020-01-01 RX ADMIN — GUAIFENESIN 1200 MG: 600 TABLET ORAL at 09:51

## 2020-01-01 RX ADMIN — NITROGLYCERIN 1 INCH: 20 OINTMENT TOPICAL at 21:50

## 2020-01-01 RX ADMIN — Medication 5 ML: at 21:21

## 2020-01-01 RX ADMIN — MIDODRINE HYDROCHLORIDE 10 MG: 5 TABLET ORAL at 08:55

## 2020-01-01 RX ADMIN — FUROSEMIDE 40 MG: 40 TABLET ORAL at 08:38

## 2020-01-01 RX ADMIN — CEFEPIME HYDROCHLORIDE 2 G: 2 INJECTION, POWDER, FOR SOLUTION INTRAVENOUS at 08:18

## 2020-01-01 RX ADMIN — TRAMADOL HYDROCHLORIDE 50 MG: 50 TABLET, FILM COATED ORAL at 20:27

## 2020-01-01 RX ADMIN — PERFLUTREN 1 ML: 6.52 INJECTION, SUSPENSION INTRAVENOUS at 08:00

## 2020-01-01 RX ADMIN — TRAMADOL HYDROCHLORIDE 50 MG: 50 TABLET, FILM COATED ORAL at 23:31

## 2020-01-01 RX ADMIN — INSULIN HUMAN 15 UNITS: 100 INJECTION, SOLUTION PARENTERAL at 16:31

## 2020-01-01 RX ADMIN — Medication 10 ML: at 15:18

## 2020-01-01 RX ADMIN — HEPARIN SODIUM 1400 UNITS: 1000 INJECTION INTRAVENOUS; SUBCUTANEOUS at 08:44

## 2020-01-01 RX ADMIN — GUAIFENESIN 1200 MG: 600 TABLET ORAL at 09:37

## 2020-01-01 RX ADMIN — FENTANYL CITRATE 50 MCG: 50 INJECTION, SOLUTION INTRAMUSCULAR; INTRAVENOUS at 12:07

## 2020-01-01 RX ADMIN — VASOPRESSIN 0.5 UNITS: 20 INJECTION INTRAVENOUS at 09:50

## 2020-01-01 RX ADMIN — FAMOTIDINE 20 MG: 20 TABLET, FILM COATED ORAL at 08:57

## 2020-01-01 RX ADMIN — HEPARIN SODIUM 2000 UNITS: 1000 INJECTION INTRAVENOUS; SUBCUTANEOUS at 08:43

## 2020-01-01 RX ADMIN — SOYBEAN OIL 250 ML: 20 INJECTION, SOLUTION INTRAVENOUS at 17:34

## 2020-01-01 RX ADMIN — INSULIN LISPRO 4 UNITS: 100 INJECTION, SOLUTION INTRAVENOUS; SUBCUTANEOUS at 21:03

## 2020-01-01 RX ADMIN — INSULIN LISPRO 6 UNITS: 100 INJECTION, SOLUTION INTRAVENOUS; SUBCUTANEOUS at 21:33

## 2020-01-01 RX ADMIN — HUMAN INSULIN 2 UNITS: 100 INJECTION, SOLUTION SUBCUTANEOUS at 08:52

## 2020-01-01 RX ADMIN — FAMOTIDINE 20 MG: 20 TABLET, FILM COATED ORAL at 08:16

## 2020-01-01 RX ADMIN — ALBUTEROL SULFATE 2.5 MG: 2.5 SOLUTION RESPIRATORY (INHALATION) at 15:37

## 2020-01-01 RX ADMIN — Medication 5 ML: at 22:24

## 2020-01-01 RX ADMIN — MIDAZOLAM 1 MG: 1 INJECTION INTRAMUSCULAR; INTRAVENOUS at 18:31

## 2020-01-01 RX ADMIN — INSULIN HUMAN 2 UNITS: 100 INJECTION, SOLUTION PARENTERAL at 22:30

## 2020-01-01 RX ADMIN — Medication 10 ML: at 14:59

## 2020-01-01 RX ADMIN — SODIUM CHLORIDE 500 ML: 900 INJECTION, SOLUTION INTRAVENOUS at 18:15

## 2020-01-01 RX ADMIN — FENTANYL CITRATE 50 MCG: 0.05 INJECTION, SOLUTION INTRAMUSCULAR; INTRAVENOUS at 14:48

## 2020-01-01 RX ADMIN — HEPARIN SODIUM 52000 UNITS: 1000 INJECTION, SOLUTION INTRAVENOUS; SUBCUTANEOUS at 10:02

## 2020-01-01 RX ADMIN — Medication 10 ML: at 05:36

## 2020-01-01 RX ADMIN — INSULIN LISPRO 4 UNITS: 100 INJECTION, SOLUTION INTRAVENOUS; SUBCUTANEOUS at 17:07

## 2020-01-01 RX ADMIN — Medication 5 ML: at 14:00

## 2020-01-01 RX ADMIN — INSULIN LISPRO 6 UNITS: 100 INJECTION, SOLUTION INTRAVENOUS; SUBCUTANEOUS at 11:48

## 2020-01-01 RX ADMIN — SERTRALINE HYDROCHLORIDE 25 MG: 50 TABLET ORAL at 08:47

## 2020-01-01 RX ADMIN — FUROSEMIDE 80 MG: 10 INJECTION, SOLUTION INTRAMUSCULAR; INTRAVENOUS at 08:04

## 2020-01-01 RX ADMIN — ALBUTEROL SULFATE 2.5 MG: 2.5 SOLUTION RESPIRATORY (INHALATION) at 01:49

## 2020-01-01 RX ADMIN — SUCCINYLCHOLINE CHLORIDE 140 MG: 20 INJECTION, SOLUTION INTRAMUSCULAR; INTRAVENOUS at 07:44

## 2020-01-01 RX ADMIN — ATORVASTATIN CALCIUM 80 MG: 40 TABLET, FILM COATED ORAL at 23:25

## 2020-01-01 RX ADMIN — BUSPIRONE HYDROCHLORIDE 10 MG: 5 TABLET ORAL at 16:21

## 2020-01-01 RX ADMIN — MEROPENEM 500 MG: 500 INJECTION, POWDER, FOR SOLUTION INTRAVENOUS at 12:29

## 2020-01-01 RX ADMIN — IPRATROPIUM BROMIDE AND ALBUTEROL SULFATE 3 ML: .5; 3 SOLUTION RESPIRATORY (INHALATION) at 07:52

## 2020-01-01 RX ADMIN — INSULIN LISPRO 6 UNITS: 100 INJECTION, SOLUTION INTRAVENOUS; SUBCUTANEOUS at 22:14

## 2020-01-01 RX ADMIN — ALPRAZOLAM 0.5 MG: 0.5 TABLET ORAL at 00:28

## 2020-01-01 RX ADMIN — FENTANYL CITRATE 50 MCG: 50 INJECTION INTRAMUSCULAR; INTRAVENOUS at 10:24

## 2020-01-01 RX ADMIN — MAGNESIUM SULFATE HEPTAHYDRATE 2 G: 40 INJECTION, SOLUTION INTRAVENOUS at 11:10

## 2020-01-01 RX ADMIN — NITROGLYCERIN 1 INCH: 20 OINTMENT TOPICAL at 08:24

## 2020-01-01 RX ADMIN — SODIUM CHLORIDE 50 ML/HR: 900 INJECTION, SOLUTION INTRAVENOUS at 11:28

## 2020-01-01 RX ADMIN — FUROSEMIDE 80 MG: 10 INJECTION, SOLUTION INTRAMUSCULAR; INTRAVENOUS at 12:04

## 2020-01-01 RX ADMIN — Medication 100 MCG/HR: at 17:05

## 2020-01-01 RX ADMIN — FUROSEMIDE 80 MG: 10 INJECTION, SOLUTION INTRAMUSCULAR; INTRAVENOUS at 00:30

## 2020-01-01 RX ADMIN — SODIUM CHLORIDE 0.5 MCG/KG/MIN: 900 INJECTION, SOLUTION INTRAVENOUS at 15:53

## 2020-01-01 RX ADMIN — IPRATROPIUM BROMIDE AND ALBUTEROL SULFATE 3 ML: .5; 3 SOLUTION RESPIRATORY (INHALATION) at 14:04

## 2020-01-01 RX ADMIN — ALPRAZOLAM 0.5 MG: 0.5 TABLET ORAL at 22:27

## 2020-01-01 RX ADMIN — Medication 10 ML: at 18:20

## 2020-01-01 RX ADMIN — INSULIN LISPRO 6 UNITS: 100 INJECTION, SOLUTION INTRAVENOUS; SUBCUTANEOUS at 21:58

## 2020-01-01 RX ADMIN — BUSPIRONE HYDROCHLORIDE 10 MG: 10 TABLET ORAL at 16:42

## 2020-01-01 RX ADMIN — INSULIN LISPRO 4 UNITS: 100 INJECTION, SOLUTION INTRAVENOUS; SUBCUTANEOUS at 22:12

## 2020-01-01 RX ADMIN — Medication: at 08:12

## 2020-01-01 RX ADMIN — INSULIN LISPRO 4 UNITS: 100 INJECTION, SOLUTION INTRAVENOUS; SUBCUTANEOUS at 16:38

## 2020-01-01 RX ADMIN — SODIUM CHLORIDE 10 G: 900 INJECTION, SOLUTION INTRAVENOUS at 08:15

## 2020-01-01 RX ADMIN — EPOETIN ALFA-EPBX 14000 UNITS: 10000 INJECTION, SOLUTION INTRAVENOUS; SUBCUTANEOUS at 10:16

## 2020-01-01 RX ADMIN — MIDODRINE HYDROCHLORIDE 10 MG: 5 TABLET ORAL at 16:00

## 2020-01-01 RX ADMIN — Medication 10 ML: at 17:31

## 2020-01-01 RX ADMIN — ALBUTEROL SULFATE 2.5 MG: 2.5 SOLUTION RESPIRATORY (INHALATION) at 14:38

## 2020-01-01 RX ADMIN — PERFLUTREN 1 ML: 6.52 INJECTION, SUSPENSION INTRAVENOUS at 09:00

## 2020-01-01 RX ADMIN — LIDOCAINE HYDROCHLORIDE 15 ML: 20 SOLUTION ORAL; TOPICAL at 14:42

## 2020-01-01 RX ADMIN — IPRATROPIUM BROMIDE AND ALBUTEROL SULFATE 3 ML: .5; 3 SOLUTION RESPIRATORY (INHALATION) at 08:50

## 2020-01-01 RX ADMIN — CHLORHEXIDINE GLUCONATE 10 ML: 1.2 RINSE ORAL at 16:17

## 2020-01-01 RX ADMIN — SODIUM CHLORIDE SOLN NEBU 3% 4 ML: 3 NEBU SOLN at 20:11

## 2020-01-01 RX ADMIN — GUAIFENESIN 1200 MG: 600 TABLET ORAL at 21:13

## 2020-01-01 RX ADMIN — ATORVASTATIN CALCIUM 80 MG: 40 TABLET, FILM COATED ORAL at 21:07

## 2020-01-01 RX ADMIN — ACETAMINOPHEN 650 MG: 325 TABLET, FILM COATED ORAL at 08:27

## 2020-01-01 RX ADMIN — INSULIN LISPRO 6 UNITS: 100 INJECTION, SOLUTION INTRAVENOUS; SUBCUTANEOUS at 21:52

## 2020-01-01 RX ADMIN — FUROSEMIDE 20 MG/HR: 10 INJECTION, SOLUTION INTRAMUSCULAR; INTRAVENOUS at 01:20

## 2020-01-01 RX ADMIN — BUPROPION HYDROCHLORIDE 75 MG: 75 TABLET, FILM COATED ORAL at 09:09

## 2020-01-01 RX ADMIN — MIRTAZAPINE 15 MG: 15 TABLET, FILM COATED ORAL at 22:17

## 2020-01-01 RX ADMIN — INSULIN GLARGINE 5 UNITS: 100 INJECTION, SOLUTION SUBCUTANEOUS at 21:59

## 2020-01-01 RX ADMIN — SODIUM CHLORIDE 0.5 MCG/KG/MIN: 900 INJECTION, SOLUTION INTRAVENOUS at 19:06

## 2020-01-01 RX ADMIN — AZTREONAM 2 G: 2 INJECTION, POWDER, LYOPHILIZED, FOR SOLUTION INTRAMUSCULAR; INTRAVENOUS at 21:30

## 2020-01-01 RX ADMIN — LEVOFLOXACIN 500 MG: 500 TABLET, FILM COATED ORAL at 21:46

## 2020-01-01 RX ADMIN — MIRTAZAPINE 15 MG: 30 TABLET, FILM COATED ORAL at 21:14

## 2020-01-01 RX ADMIN — TAMSULOSIN HYDROCHLORIDE 0.4 MG: 0.4 CAPSULE ORAL at 08:29

## 2020-01-01 RX ADMIN — SODIUM CHLORIDE 0.5 MCG/KG/MIN: 900 INJECTION, SOLUTION INTRAVENOUS at 14:08

## 2020-01-01 RX ADMIN — ACETAMINOPHEN 650 MG: 325 TABLET, FILM COATED ORAL at 03:40

## 2020-01-01 RX ADMIN — ALPRAZOLAM 0.5 MG: 0.5 TABLET ORAL at 21:09

## 2020-01-01 RX ADMIN — GUAIFENESIN 1200 MG: 600 TABLET ORAL at 08:23

## 2020-01-01 RX ADMIN — Medication 2 SPRAY: at 08:38

## 2020-01-01 RX ADMIN — HEPARIN SODIUM 1600 UNITS: 1000 INJECTION INTRAVENOUS; SUBCUTANEOUS at 13:03

## 2020-01-01 RX ADMIN — ALBUTEROL SULFATE 2.5 MG: 2.5 SOLUTION RESPIRATORY (INHALATION) at 01:31

## 2020-01-01 RX ADMIN — Medication 20 ML: at 06:22

## 2020-01-01 RX ADMIN — ACETAMINOPHEN 1000 MG: 10 INJECTION, SOLUTION INTRAVENOUS at 11:22

## 2020-01-01 RX ADMIN — FENTANYL CITRATE 100 MCG: 50 INJECTION INTRAMUSCULAR; INTRAVENOUS at 08:41

## 2020-01-01 RX ADMIN — INSULIN LISPRO 2 UNITS: 100 INJECTION, SOLUTION INTRAVENOUS; SUBCUTANEOUS at 21:53

## 2020-01-01 RX ADMIN — BUSPIRONE HYDROCHLORIDE 10 MG: 5 TABLET ORAL at 18:16

## 2020-01-01 RX ADMIN — INSULIN LISPRO 4 UNITS: 100 INJECTION, SOLUTION INTRAVENOUS; SUBCUTANEOUS at 12:02

## 2020-01-01 RX ADMIN — BACITRACIN: 500 OINTMENT TOPICAL at 16:09

## 2020-01-01 RX ADMIN — ALBUTEROL SULFATE 2.5 MG: 2.5 SOLUTION RESPIRATORY (INHALATION) at 21:00

## 2020-01-01 RX ADMIN — Medication 10 ML: at 21:20

## 2020-01-01 RX ADMIN — MIDODRINE HYDROCHLORIDE 5 MG: 5 TABLET ORAL at 18:05

## 2020-01-01 RX ADMIN — CHLORHEXIDINE GLUCONATE 10 ML: 1.2 RINSE ORAL at 18:19

## 2020-01-01 RX ADMIN — BUSPIRONE HYDROCHLORIDE 10 MG: 5 TABLET ORAL at 17:29

## 2020-01-01 RX ADMIN — INSULIN LISPRO 4 UNITS: 100 INJECTION, SOLUTION INTRAVENOUS; SUBCUTANEOUS at 21:17

## 2020-01-01 RX ADMIN — ALPRAZOLAM 0.25 MG: 0.25 TABLET ORAL at 10:54

## 2020-01-01 RX ADMIN — Medication 1 AMPULE: at 21:42

## 2020-01-01 RX ADMIN — BUPROPION HYDROCHLORIDE 75 MG: 75 TABLET, FILM COATED ORAL at 08:56

## 2020-01-01 RX ADMIN — AMIODARONE HYDROCHLORIDE 400 MG: 200 TABLET ORAL at 08:37

## 2020-01-01 RX ADMIN — PHENYLEPHRINE HYDROCHLORIDE 120 MCG: 10 INJECTION INTRAVENOUS at 08:20

## 2020-01-01 RX ADMIN — BUSPIRONE HYDROCHLORIDE 10 MG: 5 TABLET ORAL at 21:44

## 2020-01-01 RX ADMIN — INSULIN GLARGINE 5 UNITS: 100 INJECTION, SOLUTION SUBCUTANEOUS at 21:06

## 2020-01-01 RX ADMIN — TAMSULOSIN HYDROCHLORIDE 0.4 MG: 0.4 CAPSULE ORAL at 08:57

## 2020-01-01 RX ADMIN — ALBUTEROL SULFATE 2.5 MG: 2.5 SOLUTION RESPIRATORY (INHALATION) at 20:04

## 2020-01-01 RX ADMIN — BUPROPION HYDROCHLORIDE 75 MG: 75 TABLET, FILM COATED ORAL at 17:24

## 2020-01-01 RX ADMIN — Medication 2 SPRAY: at 08:47

## 2020-01-01 RX ADMIN — CHLORHEXIDINE GLUCONATE 10 ML: 1.2 RINSE ORAL at 08:39

## 2020-01-01 RX ADMIN — ALPRAZOLAM 0.5 MG: 0.5 TABLET ORAL at 11:04

## 2020-01-01 RX ADMIN — MIDODRINE HYDROCHLORIDE 10 MG: 5 TABLET ORAL at 09:51

## 2020-01-01 RX ADMIN — MUPIROCIN: 20 OINTMENT TOPICAL at 20:19

## 2020-01-01 RX ADMIN — BUSPIRONE HYDROCHLORIDE 10 MG: 10 TABLET ORAL at 09:50

## 2020-01-01 RX ADMIN — ALBUTEROL SULFATE 2.5 MG: 2.5 SOLUTION RESPIRATORY (INHALATION) at 20:23

## 2020-01-01 RX ADMIN — ALPRAZOLAM 0.5 MG: 0.5 TABLET ORAL at 02:00

## 2020-01-01 RX ADMIN — WARFARIN SODIUM 5 MG: 5 TABLET ORAL at 17:16

## 2020-01-01 RX ADMIN — FUROSEMIDE 80 MG: 10 INJECTION, SOLUTION INTRAMUSCULAR; INTRAVENOUS at 11:46

## 2020-01-01 RX ADMIN — ALBUTEROL SULFATE 2.5 MG: 2.5 SOLUTION RESPIRATORY (INHALATION) at 20:18

## 2020-01-01 RX ADMIN — BUSPIRONE HYDROCHLORIDE 10 MG: 10 TABLET ORAL at 15:43

## 2020-01-01 RX ADMIN — MEROPENEM 500 MG: 500 INJECTION, POWDER, FOR SOLUTION INTRAVENOUS at 04:13

## 2020-01-01 RX ADMIN — PHENYLEPHRINE HYDROCHLORIDE 120 MCG: 10 INJECTION INTRAVENOUS at 08:37

## 2020-01-01 RX ADMIN — ALBUTEROL SULFATE 2.5 MG: 2.5 SOLUTION RESPIRATORY (INHALATION) at 07:15

## 2020-01-01 RX ADMIN — AZTREONAM 500 MG: 1 INJECTION, POWDER, LYOPHILIZED, FOR SOLUTION INTRAMUSCULAR; INTRAVENOUS at 08:22

## 2020-01-01 RX ADMIN — ACETAMINOPHEN 650 MG: 325 SOLUTION ORAL at 20:39

## 2020-01-01 RX ADMIN — MIDODRINE HYDROCHLORIDE 10 MG: 5 TABLET ORAL at 17:18

## 2020-01-01 RX ADMIN — MIDODRINE HYDROCHLORIDE 5 MG: 5 TABLET ORAL at 07:45

## 2020-01-01 RX ADMIN — SODIUM CHLORIDE 25 ML/HR: 900 INJECTION, SOLUTION INTRAVENOUS at 17:00

## 2020-01-01 RX ADMIN — INSULIN GLARGINE 25 UNITS: 100 INJECTION, SOLUTION SUBCUTANEOUS at 21:02

## 2020-01-01 RX ADMIN — IPRATROPIUM BROMIDE AND ALBUTEROL SULFATE 3 ML: .5; 3 SOLUTION RESPIRATORY (INHALATION) at 07:49

## 2020-01-01 RX ADMIN — SODIUM CHLORIDE 0.5 MCG/KG/MIN: 900 INJECTION, SOLUTION INTRAVENOUS at 14:25

## 2020-01-01 RX ADMIN — BUSPIRONE HYDROCHLORIDE 10 MG: 5 TABLET ORAL at 09:01

## 2020-01-01 RX ADMIN — INSULIN LISPRO 3 UNITS: 100 INJECTION, SOLUTION INTRAVENOUS; SUBCUTANEOUS at 05:48

## 2020-01-01 RX ADMIN — SODIUM CHLORIDE 125 MG: 900 INJECTION, SOLUTION INTRAVENOUS at 08:52

## 2020-01-01 RX ADMIN — ALPRAZOLAM 0.5 MG: 0.5 TABLET ORAL at 20:35

## 2020-01-01 RX ADMIN — SODIUM CHLORIDE SOLN NEBU 3% 4 ML: 3 NEBU SOLN at 20:34

## 2020-01-01 RX ADMIN — Medication 1 AMPULE: at 08:13

## 2020-01-01 RX ADMIN — Medication 5 ML: at 15:37

## 2020-01-01 RX ADMIN — AMIODARONE HYDROCHLORIDE 150 MG: 50 INJECTION, SOLUTION INTRAVENOUS at 20:00

## 2020-01-01 RX ADMIN — SODIUM CHLORIDE 0.5 MCG/KG/MIN: 900 INJECTION, SOLUTION INTRAVENOUS at 07:02

## 2020-01-01 RX ADMIN — CALCIUM GLUCONATE 1 G: 98 INJECTION, SOLUTION INTRAVENOUS at 01:36

## 2020-01-01 RX ADMIN — VECURONIUM BROMIDE 3 MG: 1 INJECTION, POWDER, LYOPHILIZED, FOR SOLUTION INTRAVENOUS at 09:21

## 2020-01-01 RX ADMIN — ATORVASTATIN CALCIUM 80 MG: 40 TABLET, FILM COATED ORAL at 21:31

## 2020-01-01 RX ADMIN — IPRATROPIUM BROMIDE AND ALBUTEROL SULFATE 3 ML: .5; 3 SOLUTION RESPIRATORY (INHALATION) at 02:49

## 2020-01-01 RX ADMIN — MIDODRINE HYDROCHLORIDE 5 MG: 5 TABLET ORAL at 11:57

## 2020-01-01 RX ADMIN — IPRATROPIUM BROMIDE AND ALBUTEROL SULFATE 3 ML: .5; 3 SOLUTION RESPIRATORY (INHALATION) at 19:00

## 2020-01-01 RX ADMIN — AMIODARONE HYDROCHLORIDE 1 MG/MIN: 50 INJECTION, SOLUTION INTRAVENOUS at 18:55

## 2020-01-01 RX ADMIN — WARFARIN SODIUM 5 MG: 5 TABLET ORAL at 17:14

## 2020-01-01 RX ADMIN — BUPROPION HYDROCHLORIDE 75 MG: 75 TABLET, FILM COATED ORAL at 08:16

## 2020-01-01 RX ADMIN — Medication 10 ML: at 14:58

## 2020-01-01 RX ADMIN — INSULIN LISPRO 6 UNITS: 100 INJECTION, SOLUTION INTRAVENOUS; SUBCUTANEOUS at 16:25

## 2020-01-01 RX ADMIN — FUROSEMIDE 80 MG: 10 INJECTION, SOLUTION INTRAMUSCULAR; INTRAVENOUS at 17:02

## 2020-01-01 RX ADMIN — Medication 10 ML: at 16:20

## 2020-01-01 RX ADMIN — AZTREONAM 2 G: 2 INJECTION, POWDER, LYOPHILIZED, FOR SOLUTION INTRAMUSCULAR; INTRAVENOUS at 09:12

## 2020-01-01 RX ADMIN — METRONIDAZOLE 500 MG: 500 INJECTION, SOLUTION INTRAVENOUS at 08:22

## 2020-01-01 RX ADMIN — VANCOMYCIN HYDROCHLORIDE 1000 MG: 1 INJECTION, POWDER, LYOPHILIZED, FOR SOLUTION INTRAVENOUS at 12:34

## 2020-01-01 RX ADMIN — MIRTAZAPINE 7.5 MG: 15 TABLET, FILM COATED ORAL at 21:53

## 2020-01-01 RX ADMIN — SERTRALINE HYDROCHLORIDE 100 MG: 100 TABLET ORAL at 12:41

## 2020-01-01 RX ADMIN — INSULIN LISPRO 9 UNITS: 100 INJECTION, SOLUTION INTRAVENOUS; SUBCUTANEOUS at 18:00

## 2020-01-01 RX ADMIN — BUSPIRONE HYDROCHLORIDE 10 MG: 5 TABLET ORAL at 17:07

## 2020-01-01 RX ADMIN — INSULIN LISPRO 4 UNITS: 100 INJECTION, SOLUTION INTRAVENOUS; SUBCUTANEOUS at 12:57

## 2020-01-01 RX ADMIN — SODIUM CHLORIDE: 900 INJECTION, SOLUTION INTRAVENOUS at 08:15

## 2020-01-01 RX ADMIN — NOREPINEPHRINE BITARTRATE 0.18 MCG/KG/MIN: 1 INJECTION INTRAVENOUS at 10:52

## 2020-01-01 RX ADMIN — MIDODRINE HYDROCHLORIDE 10 MG: 5 TABLET ORAL at 08:41

## 2020-01-01 RX ADMIN — DEXMEDETOMIDINE 0.7 MCG/KG/HR: 100 INJECTION, SOLUTION, CONCENTRATE INTRAVENOUS at 05:49

## 2020-01-01 RX ADMIN — VASOPRESSIN 0.5 UNITS: 20 INJECTION INTRAVENOUS at 09:12

## 2020-01-01 RX ADMIN — POLYETHYLENE GLYCOL 3350 17 G: 17 POWDER, FOR SOLUTION ORAL at 09:01

## 2020-01-01 RX ADMIN — POLYETHYLENE GLYCOL 3350 17 G: 17 POWDER, FOR SOLUTION ORAL at 09:06

## 2020-01-01 RX ADMIN — Medication 5 MG: at 07:44

## 2020-01-01 RX ADMIN — ALBUTEROL SULFATE 2.5 MG: 2.5 SOLUTION RESPIRATORY (INHALATION) at 07:29

## 2020-01-01 RX ADMIN — OLANZAPINE 5 MG: 5 TABLET, FILM COATED ORAL at 21:17

## 2020-01-01 RX ADMIN — Medication 10 ML: at 05:54

## 2020-01-01 RX ADMIN — Medication 10 ML: at 22:51

## 2020-01-01 RX ADMIN — NITROGLYCERIN 1 INCH: 20 OINTMENT TOPICAL at 20:21

## 2020-01-01 RX ADMIN — BUSPIRONE HYDROCHLORIDE 10 MG: 5 TABLET ORAL at 08:41

## 2020-01-01 RX ADMIN — NOREPINEPHRINE BITARTRATE 0.2 MCG/KG/MIN: 1 INJECTION INTRAVENOUS at 09:35

## 2020-01-01 RX ADMIN — WARFARIN SODIUM 3 MG: 1 TABLET ORAL at 16:32

## 2020-01-01 RX ADMIN — NOREPINEPHRINE BITARTRATE 0.2 MCG/KG/MIN: 1 INJECTION INTRAVENOUS at 00:07

## 2020-01-01 RX ADMIN — PHYTONADIONE 10 MG: 5 TABLET ORAL at 11:51

## 2020-01-01 RX ADMIN — BUSPIRONE HYDROCHLORIDE 10 MG: 5 TABLET ORAL at 21:56

## 2020-01-01 RX ADMIN — INSULIN LISPRO 4 UNITS: 100 INJECTION, SOLUTION INTRAVENOUS; SUBCUTANEOUS at 12:12

## 2020-01-01 RX ADMIN — ALBUTEROL SULFATE 2.5 MG: 2.5 SOLUTION RESPIRATORY (INHALATION) at 03:04

## 2020-01-01 RX ADMIN — CEFEPIME HYDROCHLORIDE 2 G: 2 INJECTION, POWDER, FOR SOLUTION INTRAVENOUS at 10:04

## 2020-01-01 RX ADMIN — MORPHINE SULFATE 2 MG: 2 INJECTION, SOLUTION INTRAMUSCULAR; INTRAVENOUS at 11:24

## 2020-01-01 RX ADMIN — BENZONATATE 100 MG: 100 CAPSULE ORAL at 06:43

## 2020-01-01 RX ADMIN — MEROPENEM 500 MG: 500 INJECTION, POWDER, FOR SOLUTION INTRAVENOUS at 20:04

## 2020-01-01 RX ADMIN — INSULIN LISPRO 4 UNITS: 100 INJECTION, SOLUTION INTRAVENOUS; SUBCUTANEOUS at 13:04

## 2020-01-01 RX ADMIN — SODIUM CHLORIDE 0.25 MCG/KG/MIN: 900 INJECTION, SOLUTION INTRAVENOUS at 04:30

## 2020-01-01 RX ADMIN — WARFARIN SODIUM 5 MG: 5 TABLET ORAL at 21:09

## 2020-01-01 RX ADMIN — NITROGLYCERIN 1 INCH: 20 OINTMENT TOPICAL at 15:57

## 2020-01-01 RX ADMIN — INSULIN GLARGINE 18 UNITS: 100 INJECTION, SOLUTION SUBCUTANEOUS at 23:27

## 2020-01-01 RX ADMIN — ALBUMIN (HUMAN) 12.5 G: 0.25 INJECTION, SOLUTION INTRAVENOUS at 23:19

## 2020-01-01 RX ADMIN — Medication 5 ML: at 21:53

## 2020-01-01 RX ADMIN — ALPRAZOLAM 0.5 MG: 0.5 TABLET ORAL at 16:21

## 2020-01-01 RX ADMIN — Medication 12 MCG/MIN: at 20:17

## 2020-01-01 RX ADMIN — METRONIDAZOLE 500 MG: 500 INJECTION, SOLUTION INTRAVENOUS at 08:30

## 2020-01-01 RX ADMIN — DEXMEDETOMIDINE 0.7 MCG/KG/HR: 100 INJECTION, SOLUTION, CONCENTRATE INTRAVENOUS at 00:23

## 2020-01-01 RX ADMIN — NITROGLYCERIN 1 INCH: 20 OINTMENT TOPICAL at 17:39

## 2020-01-01 RX ADMIN — INSULIN LISPRO 8 UNITS: 100 INJECTION, SOLUTION INTRAVENOUS; SUBCUTANEOUS at 17:04

## 2020-01-01 RX ADMIN — ATORVASTATIN CALCIUM 80 MG: 40 TABLET, FILM COATED ORAL at 21:11

## 2020-01-01 RX ADMIN — INSULIN LISPRO 6 UNITS: 100 INJECTION, SOLUTION INTRAVENOUS; SUBCUTANEOUS at 11:34

## 2020-01-01 RX ADMIN — CHLORHEXIDINE GLUCONATE 10 ML: 1.2 RINSE ORAL at 08:13

## 2020-01-01 RX ADMIN — ALPRAZOLAM 0.5 MG: 0.5 TABLET ORAL at 08:55

## 2020-01-01 RX ADMIN — Medication 10 ML: at 16:07

## 2020-01-01 RX ADMIN — MIDODRINE HYDROCHLORIDE 10 MG: 5 TABLET ORAL at 21:30

## 2020-01-01 RX ADMIN — IPRATROPIUM BROMIDE AND ALBUTEROL SULFATE 3 ML: .5; 3 SOLUTION RESPIRATORY (INHALATION) at 08:22

## 2020-01-01 RX ADMIN — SODIUM CHLORIDE 0.5 MCG/KG/MIN: 900 INJECTION, SOLUTION INTRAVENOUS at 18:48

## 2020-01-01 RX ADMIN — SODIUM POLYSTYRENE SULFONATE 15 G: 15 SUSPENSION ORAL; RECTAL at 14:51

## 2020-01-01 RX ADMIN — FAMOTIDINE 20 MG: 40 POWDER, FOR SUSPENSION ORAL at 08:37

## 2020-01-01 RX ADMIN — VANCOMYCIN HYDROCHLORIDE 1750 MG: 10 INJECTION, POWDER, LYOPHILIZED, FOR SOLUTION INTRAVENOUS at 14:38

## 2020-01-01 RX ADMIN — INSULIN LISPRO 9 UNITS: 100 INJECTION, SOLUTION INTRAVENOUS; SUBCUTANEOUS at 20:34

## 2020-01-01 RX ADMIN — ALBUTEROL SULFATE 2.5 MG: 2.5 SOLUTION RESPIRATORY (INHALATION) at 15:58

## 2020-01-01 RX ADMIN — INSULIN LISPRO 4 UNITS: 100 INJECTION, SOLUTION INTRAVENOUS; SUBCUTANEOUS at 17:26

## 2020-01-01 RX ADMIN — DEXMEDETOMIDINE 0.7 MCG/KG/HR: 100 INJECTION, SOLUTION, CONCENTRATE INTRAVENOUS at 02:30

## 2020-01-01 RX ADMIN — MEROPENEM 500 MG: 500 INJECTION, POWDER, FOR SOLUTION INTRAVENOUS at 05:31

## 2020-01-01 RX ADMIN — MIDODRINE HYDROCHLORIDE 10 MG: 5 TABLET ORAL at 11:26

## 2020-01-01 RX ADMIN — ALBUTEROL SULFATE 2.5 MG: 2.5 SOLUTION RESPIRATORY (INHALATION) at 01:55

## 2020-01-01 RX ADMIN — OLANZAPINE 5 MG: 5 TABLET, FILM COATED ORAL at 22:49

## 2020-01-01 RX ADMIN — HUMAN INSULIN 6 UNITS: 100 INJECTION, SOLUTION SUBCUTANEOUS at 22:03

## 2020-01-01 RX ADMIN — INSULIN LISPRO 8 UNITS: 100 INJECTION, SOLUTION INTRAVENOUS; SUBCUTANEOUS at 16:29

## 2020-01-01 RX ADMIN — INSULIN LISPRO 2 UNITS: 100 INJECTION, SOLUTION INTRAVENOUS; SUBCUTANEOUS at 13:06

## 2020-01-01 RX ADMIN — ALBUMIN (HUMAN) 12.5 G: 0.25 INJECTION, SOLUTION INTRAVENOUS at 05:20

## 2020-01-01 RX ADMIN — INSULIN LISPRO 2 UNITS: 100 INJECTION, SOLUTION INTRAVENOUS; SUBCUTANEOUS at 12:33

## 2020-01-01 RX ADMIN — DEXTROSE MONOHYDRATE 12 MCG/MIN: 5 INJECTION, SOLUTION INTRAVENOUS at 20:17

## 2020-01-01 RX ADMIN — MIDODRINE HYDROCHLORIDE 10 MG: 5 TABLET ORAL at 16:46

## 2020-01-01 RX ADMIN — MORPHINE SULFATE 2 MG: 2 INJECTION, SOLUTION INTRAMUSCULAR; INTRAVENOUS at 18:11

## 2020-01-01 RX ADMIN — INSULIN LISPRO 6 UNITS: 100 INJECTION, SOLUTION INTRAVENOUS; SUBCUTANEOUS at 21:29

## 2020-01-01 RX ADMIN — IPRATROPIUM BROMIDE AND ALBUTEROL SULFATE 3 ML: .5; 3 SOLUTION RESPIRATORY (INHALATION) at 22:36

## 2020-01-01 RX ADMIN — ACETAMINOPHEN 650 MG: 325 TABLET, FILM COATED ORAL at 11:04

## 2020-01-01 RX ADMIN — MIDODRINE HYDROCHLORIDE 10 MG: 5 TABLET ORAL at 17:26

## 2020-01-01 RX ADMIN — SODIUM CHLORIDE 0.5 MCG/KG/MIN: 900 INJECTION, SOLUTION INTRAVENOUS at 07:15

## 2020-01-01 RX ADMIN — Medication 10 ML: at 15:51

## 2020-01-01 RX ADMIN — SERTRALINE HYDROCHLORIDE 100 MG: 100 TABLET ORAL at 08:38

## 2020-01-01 RX ADMIN — Medication 0.16 MCG/KG/MIN: at 02:15

## 2020-01-01 RX ADMIN — MIDODRINE HYDROCHLORIDE 10 MG: 5 TABLET ORAL at 21:09

## 2020-01-01 RX ADMIN — ALBUTEROL SULFATE 2.5 MG: 2.5 SOLUTION RESPIRATORY (INHALATION) at 19:23

## 2020-01-01 RX ADMIN — INSULIN HUMAN 5 UNITS: 100 INJECTION, SOLUTION PARENTERAL at 11:53

## 2020-01-01 RX ADMIN — TAMSULOSIN HYDROCHLORIDE 0.4 MG: 0.4 CAPSULE ORAL at 21:14

## 2020-01-01 RX ADMIN — BUPROPION HYDROCHLORIDE 75 MG: 75 TABLET, FILM COATED ORAL at 17:51

## 2020-01-01 RX ADMIN — TAMSULOSIN HYDROCHLORIDE 0.4 MG: 0.4 CAPSULE ORAL at 20:02

## 2020-01-01 RX ADMIN — MIDAZOLAM 1 MG: 1 INJECTION INTRAMUSCULAR; INTRAVENOUS at 23:01

## 2020-01-01 RX ADMIN — MUPIROCIN: 20 OINTMENT TOPICAL at 17:36

## 2020-01-01 RX ADMIN — CLONAZEPAM 0.5 MG: 0.5 TABLET ORAL at 21:38

## 2020-01-01 RX ADMIN — POTASSIUM CHLORIDE 10 MEQ: 14.9 INJECTION, SOLUTION INTRAVENOUS at 19:24

## 2020-01-01 RX ADMIN — Medication 10 ML: at 22:26

## 2020-01-01 RX ADMIN — Medication 2 SPRAY: at 17:36

## 2020-01-01 RX ADMIN — INSULIN LISPRO 2 UNITS: 100 INJECTION, SOLUTION INTRAVENOUS; SUBCUTANEOUS at 07:30

## 2020-01-01 RX ADMIN — TAMSULOSIN HYDROCHLORIDE 0.4 MG: 0.4 CAPSULE ORAL at 08:16

## 2020-01-01 RX ADMIN — CEFEPIME HYDROCHLORIDE 2 G: 2 INJECTION, POWDER, FOR SOLUTION INTRAVENOUS at 09:21

## 2020-01-01 RX ADMIN — INSULIN LISPRO 4 UNITS: 100 INJECTION, SOLUTION INTRAVENOUS; SUBCUTANEOUS at 16:37

## 2020-01-01 RX ADMIN — AMIODARONE HYDROCHLORIDE 0.5 MG/MIN: 50 INJECTION, SOLUTION INTRAVENOUS at 04:57

## 2020-01-01 RX ADMIN — SODIUM POLYSTYRENE SULFONATE 15 G: 15 SUSPENSION ORAL; RECTAL at 03:09

## 2020-01-01 RX ADMIN — INSULIN GLARGINE 28 UNITS: 100 INJECTION, SOLUTION SUBCUTANEOUS at 12:56

## 2020-01-01 RX ADMIN — IPRATROPIUM BROMIDE AND ALBUTEROL SULFATE 3 ML: .5; 3 SOLUTION RESPIRATORY (INHALATION) at 07:09

## 2020-01-01 RX ADMIN — BUSPIRONE HYDROCHLORIDE 10 MG: 10 TABLET ORAL at 21:34

## 2020-01-01 RX ADMIN — MORPHINE SULFATE 5 MG: 10 INJECTION INTRAVENOUS at 02:21

## 2020-01-01 RX ADMIN — WARFARIN SODIUM 4 MG: 1 TABLET ORAL at 17:14

## 2020-01-01 RX ADMIN — FUROSEMIDE 80 MG: 40 INJECTION, SOLUTION INTRAMUSCULAR; INTRAVENOUS at 17:38

## 2020-01-01 RX ADMIN — INSULIN LISPRO 4 UNITS: 100 INJECTION, SOLUTION INTRAVENOUS; SUBCUTANEOUS at 22:00

## 2020-01-01 RX ADMIN — INSULIN GLARGINE 18 UNITS: 100 INJECTION, SOLUTION SUBCUTANEOUS at 22:20

## 2020-01-01 RX ADMIN — Medication 10 ML: at 22:04

## 2020-01-01 RX ADMIN — INSULIN LISPRO 6 UNITS: 100 INJECTION, SOLUTION INTRAVENOUS; SUBCUTANEOUS at 21:45

## 2020-01-01 RX ADMIN — BUSPIRONE HYDROCHLORIDE 10 MG: 5 TABLET ORAL at 17:31

## 2020-01-01 RX ADMIN — DEXTROSE 12.5 ML: 50 INJECTION, SOLUTION INTRAVENOUS at 08:25

## 2020-01-01 RX ADMIN — INSULIN GLARGINE 10 UNITS: 100 INJECTION, SOLUTION SUBCUTANEOUS at 20:33

## 2020-01-01 RX ADMIN — Medication 5 MG: at 08:15

## 2020-01-01 RX ADMIN — ACETAMINOPHEN 1000 MG: 500 TABLET ORAL at 16:21

## 2020-01-01 RX ADMIN — INSULIN GLARGINE 18 UNITS: 100 INJECTION, SOLUTION SUBCUTANEOUS at 21:04

## 2020-01-01 RX ADMIN — INSULIN GLARGINE 25 UNITS: 100 INJECTION, SOLUTION SUBCUTANEOUS at 08:31

## 2020-01-01 RX ADMIN — INSULIN LISPRO 4 UNITS: 100 INJECTION, SOLUTION INTRAVENOUS; SUBCUTANEOUS at 10:57

## 2020-01-01 RX ADMIN — MIDODRINE HYDROCHLORIDE 10 MG: 5 TABLET ORAL at 17:08

## 2020-01-01 RX ADMIN — BUSPIRONE HYDROCHLORIDE 10 MG: 5 TABLET ORAL at 09:20

## 2020-01-01 RX ADMIN — Medication 2 SPRAY: at 17:07

## 2020-01-01 RX ADMIN — MIDODRINE HYDROCHLORIDE 10 MG: 5 TABLET ORAL at 12:20

## 2020-01-01 RX ADMIN — POLYETHYLENE GLYCOL 3350 17 G: 17 POWDER, FOR SOLUTION ORAL at 08:26

## 2020-01-01 RX ADMIN — BUPROPION HYDROCHLORIDE 75 MG: 75 TABLET, FILM COATED ORAL at 17:10

## 2020-01-01 RX ADMIN — LOPERAMIDE HYDROCHLORIDE 2 MG: 2 CAPSULE ORAL at 08:10

## 2020-01-01 RX ADMIN — Medication 1 AMPULE: at 21:28

## 2020-01-01 RX ADMIN — VECURONIUM BROMIDE 5 MG: 1 INJECTION, POWDER, LYOPHILIZED, FOR SOLUTION INTRAVENOUS at 08:41

## 2020-01-01 RX ADMIN — Medication 2 SPRAY: at 12:18

## 2020-01-01 RX ADMIN — INSULIN LISPRO 15 UNITS: 100 INJECTION, SOLUTION INTRAVENOUS; SUBCUTANEOUS at 11:19

## 2020-01-01 RX ADMIN — MIDODRINE HYDROCHLORIDE 10 MG: 5 TABLET ORAL at 17:58

## 2020-01-01 RX ADMIN — BUSPIRONE HYDROCHLORIDE 10 MG: 10 TABLET ORAL at 20:28

## 2020-01-01 RX ADMIN — TRAMADOL HYDROCHLORIDE 50 MG: 50 TABLET, FILM COATED ORAL at 16:22

## 2020-01-01 RX ADMIN — HUMAN INSULIN 2 UNITS: 100 INJECTION, SOLUTION SUBCUTANEOUS at 08:20

## 2020-01-01 RX ADMIN — GUAIFENESIN 1200 MG: 600 TABLET ORAL at 21:49

## 2020-01-01 RX ADMIN — FAMOTIDINE 20 MG: 10 INJECTION INTRAVENOUS at 08:35

## 2020-01-01 RX ADMIN — SERTRALINE HYDROCHLORIDE 100 MG: 50 TABLET ORAL at 09:22

## 2020-01-01 RX ADMIN — INSULIN LISPRO 6 UNITS: 100 INJECTION, SOLUTION INTRAVENOUS; SUBCUTANEOUS at 18:22

## 2020-01-01 RX ADMIN — IPRATROPIUM BROMIDE AND ALBUTEROL SULFATE 3 ML: .5; 3 SOLUTION RESPIRATORY (INHALATION) at 20:30

## 2020-01-01 RX ADMIN — SODIUM CHLORIDE 4.5 UNITS/HR: 900 INJECTION, SOLUTION INTRAVENOUS at 00:30

## 2020-01-01 RX ADMIN — NITROGLYCERIN 1 INCH: 20 OINTMENT TOPICAL at 09:14

## 2020-01-01 RX ADMIN — SODIUM CHLORIDE SOLN NEBU 3% 4 ML: 3 NEBU SOLN at 21:05

## 2020-01-01 RX ADMIN — CHLORHEXIDINE GLUCONATE 10 ML: 1.2 RINSE ORAL at 10:36

## 2020-01-01 RX ADMIN — INSULIN GLARGINE 28 UNITS: 100 INJECTION, SOLUTION SUBCUTANEOUS at 08:01

## 2020-01-01 RX ADMIN — INSULIN GLARGINE 10 UNITS: 100 INJECTION, SOLUTION SUBCUTANEOUS at 20:38

## 2020-01-01 RX ADMIN — IPRATROPIUM BROMIDE AND ALBUTEROL SULFATE 3 ML: .5; 3 SOLUTION RESPIRATORY (INHALATION) at 11:28

## 2020-01-01 RX ADMIN — ACETAMINOPHEN 650 MG: 325 TABLET, FILM COATED ORAL at 23:06

## 2020-01-01 RX ADMIN — Medication 10 ML: at 13:01

## 2020-01-01 RX ADMIN — INSULIN GLARGINE 28 UNITS: 100 INJECTION, SOLUTION SUBCUTANEOUS at 08:20

## 2020-01-01 RX ADMIN — ALBUTEROL SULFATE 2.5 MG: 2.5 SOLUTION RESPIRATORY (INHALATION) at 07:30

## 2020-01-01 RX ADMIN — Medication 10 ML: at 21:49

## 2020-01-01 RX ADMIN — INSULIN LISPRO 8 UNITS: 100 INJECTION, SOLUTION INTRAVENOUS; SUBCUTANEOUS at 21:30

## 2020-01-01 RX ADMIN — ALPRAZOLAM 0.25 MG: 0.25 TABLET ORAL at 17:06

## 2020-01-01 RX ADMIN — BUSPIRONE HYDROCHLORIDE 10 MG: 5 TABLET ORAL at 23:08

## 2020-01-01 RX ADMIN — BUSPIRONE HYDROCHLORIDE 10 MG: 5 TABLET ORAL at 16:47

## 2020-01-01 RX ADMIN — INSULIN GLARGINE 45 UNITS: 100 INJECTION, SOLUTION SUBCUTANEOUS at 21:09

## 2020-01-01 RX ADMIN — Medication 2 SPRAY: at 08:43

## 2020-01-01 RX ADMIN — MORPHINE SULFATE 5 MG: 10 INJECTION INTRAVENOUS at 19:11

## 2020-01-01 RX ADMIN — VANCOMYCIN HYDROCHLORIDE 1000 MG: 1 INJECTION, POWDER, LYOPHILIZED, FOR SOLUTION INTRAVENOUS at 06:58

## 2020-01-01 RX ADMIN — ALBUTEROL SULFATE 2.5 MG: 2.5 SOLUTION RESPIRATORY (INHALATION) at 19:43

## 2020-01-01 RX ADMIN — SODIUM POLYSTYRENE SULFONATE 45 G: 15 SUSPENSION ORAL; RECTAL at 01:00

## 2020-01-01 RX ADMIN — GUAIFENESIN 1200 MG: 600 TABLET ORAL at 22:45

## 2020-01-01 RX ADMIN — SODIUM CHLORIDE SOLN NEBU 3% 4 ML: 3 NEBU SOLN at 13:37

## 2020-01-01 RX ADMIN — ALBUTEROL SULFATE 2.5 MG: 2.5 SOLUTION RESPIRATORY (INHALATION) at 14:31

## 2020-01-01 RX ADMIN — ACETAMINOPHEN 1000 MG: 10 INJECTION, SOLUTION INTRAVENOUS at 13:15

## 2020-01-01 RX ADMIN — MEROPENEM 500 MG: 500 INJECTION, POWDER, FOR SOLUTION INTRAVENOUS at 20:17

## 2020-01-01 RX ADMIN — Medication 20 ML: at 14:46

## 2020-01-01 RX ADMIN — INSULIN GLARGINE 15 UNITS: 100 INJECTION, SOLUTION SUBCUTANEOUS at 21:41

## 2020-01-01 RX ADMIN — VANCOMYCIN HYDROCHLORIDE 1250 MG: 10 INJECTION, POWDER, LYOPHILIZED, FOR SOLUTION INTRAVENOUS at 13:51

## 2020-01-01 RX ADMIN — SODIUM CHLORIDE 0.5 MCG/KG/MIN: 900 INJECTION, SOLUTION INTRAVENOUS at 00:03

## 2020-01-01 RX ADMIN — MEROPENEM 500 MG: 500 INJECTION, POWDER, FOR SOLUTION INTRAVENOUS at 04:39

## 2020-01-01 RX ADMIN — INSULIN HUMAN 5 UNITS: 100 INJECTION, SOLUTION PARENTERAL at 06:34

## 2020-01-01 RX ADMIN — CHLORHEXIDINE GLUCONATE 10 ML: 1.2 RINSE ORAL at 10:13

## 2020-01-01 RX ADMIN — BUPROPION HYDROCHLORIDE 75 MG: 75 TABLET, FILM COATED ORAL at 08:05

## 2020-01-01 RX ADMIN — INSULIN LISPRO 6 UNITS: 100 INJECTION, SOLUTION INTRAVENOUS; SUBCUTANEOUS at 22:49

## 2020-01-01 RX ADMIN — NITROGLYCERIN 1 INCH: 20 OINTMENT TOPICAL at 21:14

## 2020-01-01 RX ADMIN — SODIUM CHLORIDE 125 MG: 900 INJECTION, SOLUTION INTRAVENOUS at 16:11

## 2020-01-01 RX ADMIN — AMIODARONE HYDROCHLORIDE 400 MG: 200 TABLET ORAL at 10:55

## 2020-01-01 RX ADMIN — HEPARIN SODIUM 700 UNITS: 1000 INJECTION INTRAVENOUS; SUBCUTANEOUS at 13:48

## 2020-01-01 RX ADMIN — TRAMADOL HYDROCHLORIDE 50 MG: 50 TABLET, FILM COATED ORAL at 20:22

## 2020-01-01 RX ADMIN — ALBUTEROL SULFATE 2.5 MG: 2.5 SOLUTION RESPIRATORY (INHALATION) at 19:26

## 2020-01-01 RX ADMIN — BUSPIRONE HYDROCHLORIDE 10 MG: 10 TABLET ORAL at 21:00

## 2020-01-01 RX ADMIN — POLYETHYLENE GLYCOL 3350 17 G: 17 POWDER, FOR SOLUTION ORAL at 09:38

## 2020-01-01 RX ADMIN — BUSPIRONE HYDROCHLORIDE 10 MG: 5 TABLET ORAL at 21:38

## 2020-01-01 RX ADMIN — ACETAMINOPHEN 650 MG: 325 TABLET, FILM COATED ORAL at 12:29

## 2020-01-01 RX ADMIN — INSULIN LISPRO 4 UNITS: 100 INJECTION, SOLUTION INTRAVENOUS; SUBCUTANEOUS at 22:55

## 2020-01-01 RX ADMIN — MIDODRINE HYDROCHLORIDE 5 MG: 5 TABLET ORAL at 16:32

## 2020-01-01 RX ADMIN — Medication 2 SPRAY: at 16:22

## 2020-01-01 RX ADMIN — SERTRALINE HYDROCHLORIDE 100 MG: 50 TABLET ORAL at 09:09

## 2020-01-01 RX ADMIN — Medication 10 ML: at 06:14

## 2020-01-01 RX ADMIN — HEPARIN SODIUM 2000 UNITS: 1000 INJECTION INTRAVENOUS; SUBCUTANEOUS at 13:47

## 2020-01-01 RX ADMIN — IPRATROPIUM BROMIDE AND ALBUTEROL SULFATE 3 ML: .5; 3 SOLUTION RESPIRATORY (INHALATION) at 07:46

## 2020-01-01 RX ADMIN — HUMAN INSULIN 10 UNITS: 100 INJECTION, SOLUTION SUBCUTANEOUS at 17:29

## 2020-01-01 RX ADMIN — SODIUM CHLORIDE SOLN NEBU 3% 4 ML: 3 NEBU SOLN at 20:12

## 2020-01-01 RX ADMIN — Medication 1 AMPULE: at 09:00

## 2020-01-01 RX ADMIN — NITROGLYCERIN 1 INCH: 20 OINTMENT TOPICAL at 09:33

## 2020-01-01 RX ADMIN — SODIUM CHLORIDE 0.5 MCG/KG/MIN: 900 INJECTION, SOLUTION INTRAVENOUS at 02:19

## 2020-01-01 RX ADMIN — MORPHINE SULFATE 5 MG: 10 INJECTION INTRAVENOUS at 09:54

## 2020-01-01 RX ADMIN — GUAIFENESIN 1200 MG: 600 TABLET ORAL at 21:32

## 2020-01-01 RX ADMIN — MIDODRINE HYDROCHLORIDE 5 MG: 5 TABLET ORAL at 08:51

## 2020-01-01 RX ADMIN — FUROSEMIDE 40 MG: 10 INJECTION, SOLUTION INTRAMUSCULAR; INTRAVENOUS at 17:11

## 2020-01-01 RX ADMIN — POLYETHYLENE GLYCOL 3350 17 G: 17 POWDER, FOR SOLUTION ORAL at 08:59

## 2020-01-01 RX ADMIN — MIDODRINE HYDROCHLORIDE 10 MG: 5 TABLET ORAL at 20:44

## 2020-01-01 RX ADMIN — INSULIN LISPRO 2 UNITS: 100 INJECTION, SOLUTION INTRAVENOUS; SUBCUTANEOUS at 11:30

## 2020-01-01 RX ADMIN — WARFARIN SODIUM 2 MG: 1 TABLET ORAL at 18:01

## 2020-01-01 RX ADMIN — HUMAN INSULIN 4 UNITS: 100 INJECTION, SOLUTION SUBCUTANEOUS at 16:51

## 2020-01-01 RX ADMIN — IPRATROPIUM BROMIDE AND ALBUTEROL SULFATE 3 ML: .5; 3 SOLUTION RESPIRATORY (INHALATION) at 11:42

## 2020-01-01 RX ADMIN — Medication 10 ML: at 23:16

## 2020-01-01 RX ADMIN — MIDODRINE HYDROCHLORIDE 10 MG: 5 TABLET ORAL at 16:28

## 2020-01-01 RX ADMIN — MIRTAZAPINE 15 MG: 15 TABLET, FILM COATED ORAL at 22:15

## 2020-01-01 RX ADMIN — INSULIN LISPRO 4 UNITS: 100 INJECTION, SOLUTION INTRAVENOUS; SUBCUTANEOUS at 17:25

## 2020-01-01 RX ADMIN — FAMOTIDINE 20 MG: 20 TABLET, FILM COATED ORAL at 09:00

## 2020-01-01 RX ADMIN — OLANZAPINE 5 MG: 5 TABLET, FILM COATED ORAL at 21:10

## 2020-01-01 RX ADMIN — INSULIN LISPRO 6 UNITS: 100 INJECTION, SOLUTION INTRAVENOUS; SUBCUTANEOUS at 08:02

## 2020-01-01 RX ADMIN — SODIUM CHLORIDE 15 ML/HR: 900 INJECTION, SOLUTION INTRAVENOUS at 21:26

## 2020-01-01 RX ADMIN — TAMSULOSIN HYDROCHLORIDE 0.4 MG: 0.4 CAPSULE ORAL at 08:32

## 2020-01-01 RX ADMIN — Medication 10 ML: at 15:35

## 2020-01-01 RX ADMIN — INSULIN LISPRO 4 UNITS: 100 INJECTION, SOLUTION INTRAVENOUS; SUBCUTANEOUS at 08:46

## 2020-01-01 RX ADMIN — BUSPIRONE HYDROCHLORIDE 10 MG: 5 TABLET ORAL at 17:12

## 2020-01-01 RX ADMIN — NITROGLYCERIN 10 MCG/MIN: 200 INJECTION, SOLUTION INTRAVENOUS at 08:15

## 2020-01-01 RX ADMIN — Medication 15 ML: at 05:59

## 2020-01-01 RX ADMIN — MIRTAZAPINE 15 MG: 15 TABLET, FILM COATED ORAL at 21:30

## 2020-01-01 RX ADMIN — NITROGLYCERIN 1 INCH: 20 OINTMENT TOPICAL at 08:42

## 2020-01-01 RX ADMIN — TAMSULOSIN HYDROCHLORIDE 0.4 MG: 0.4 CAPSULE ORAL at 08:03

## 2020-01-01 RX ADMIN — SERTRALINE HYDROCHLORIDE 100 MG: 50 TABLET ORAL at 08:37

## 2020-01-01 RX ADMIN — VASOPRESSIN 1 UNITS: 20 INJECTION INTRAVENOUS at 10:03

## 2020-01-01 RX ADMIN — MIRTAZAPINE 15 MG: 15 TABLET, FILM COATED ORAL at 22:20

## 2020-01-01 RX ADMIN — AMIODARONE HYDROCHLORIDE 1 MG/MIN: 50 INJECTION, SOLUTION INTRAVENOUS at 20:00

## 2020-01-01 RX ADMIN — Medication 1 AMPULE: at 23:25

## 2020-01-01 RX ADMIN — ALPRAZOLAM 0.5 MG: 0.5 TABLET ORAL at 09:06

## 2020-01-01 RX ADMIN — HEPARIN SODIUM 2000 UNITS: 1000 INJECTION INTRAVENOUS; SUBCUTANEOUS at 08:15

## 2020-01-01 RX ADMIN — WARFARIN SODIUM 2.5 MG: 2.5 TABLET ORAL at 17:59

## 2020-01-01 RX ADMIN — Medication 2 SPRAY: at 09:54

## 2020-01-01 RX ADMIN — Medication 2 SPRAY: at 10:03

## 2020-01-01 RX ADMIN — BUPROPION HYDROCHLORIDE 75 MG: 75 TABLET, FILM COATED ORAL at 08:03

## 2020-01-01 RX ADMIN — WARFARIN SODIUM 2.5 MG: 1 TABLET ORAL at 17:00

## 2020-01-01 RX ADMIN — INSULIN LISPRO 10 UNITS: 100 INJECTION, SOLUTION INTRAVENOUS; SUBCUTANEOUS at 12:32

## 2020-01-01 RX ADMIN — MEROPENEM 500 MG: 500 INJECTION, POWDER, FOR SOLUTION INTRAVENOUS at 21:23

## 2020-01-01 RX ADMIN — INSULIN LISPRO 3 UNITS: 100 INJECTION, SOLUTION INTRAVENOUS; SUBCUTANEOUS at 21:34

## 2020-01-01 RX ADMIN — BACITRACIN: 500 OINTMENT TOPICAL at 12:07

## 2020-01-01 RX ADMIN — NITROGLYCERIN 1 INCH: 20 OINTMENT TOPICAL at 20:17

## 2020-01-01 RX ADMIN — BUPROPION HYDROCHLORIDE 75 MG: 75 TABLET, FILM COATED ORAL at 12:40

## 2020-01-01 RX ADMIN — PHENYLEPHRINE HYDROCHLORIDE 120 MCG: 10 INJECTION INTRAVENOUS at 07:54

## 2020-01-01 RX ADMIN — NOREPINEPHRINE BITARTRATE 0.2 MCG/KG/MIN: 1 INJECTION INTRAVENOUS at 23:20

## 2020-01-01 RX ADMIN — VANCOMYCIN HYDROCHLORIDE 1500 MG: 10 INJECTION, POWDER, LYOPHILIZED, FOR SOLUTION INTRAVENOUS at 10:23

## 2020-01-01 RX ADMIN — BUSPIRONE HYDROCHLORIDE 10 MG: 10 TABLET ORAL at 21:40

## 2020-01-01 RX ADMIN — DEXMEDETOMIDINE 0.6 MCG/KG/HR: 100 INJECTION, SOLUTION, CONCENTRATE INTRAVENOUS at 18:44

## 2020-01-01 RX ADMIN — NITROGLYCERIN 1 INCH: 20 OINTMENT TOPICAL at 16:05

## 2020-01-01 RX ADMIN — Medication 10 ML: at 05:07

## 2020-01-01 RX ADMIN — TRAMADOL HYDROCHLORIDE 50 MG: 50 TABLET, FILM COATED ORAL at 22:17

## 2020-01-01 RX ADMIN — INSULIN LISPRO 6 UNITS: 100 INJECTION, SOLUTION INTRAVENOUS; SUBCUTANEOUS at 22:44

## 2020-01-01 RX ADMIN — ALBUTEROL SULFATE 2.5 MG: 2.5 SOLUTION RESPIRATORY (INHALATION) at 20:50

## 2020-01-01 RX ADMIN — FUROSEMIDE 40 MG: 40 TABLET ORAL at 08:23

## 2020-01-01 RX ADMIN — MIDODRINE HYDROCHLORIDE 5 MG: 5 TABLET ORAL at 16:25

## 2020-01-01 RX ADMIN — Medication 15 ML: at 23:04

## 2020-01-01 RX ADMIN — INSULIN GLARGINE 18 UNITS: 100 INJECTION, SOLUTION SUBCUTANEOUS at 22:03

## 2020-01-01 RX ADMIN — BUSPIRONE HYDROCHLORIDE 10 MG: 10 TABLET ORAL at 22:20

## 2020-01-01 RX ADMIN — GUAIFENESIN 1200 MG: 600 TABLET ORAL at 12:06

## 2020-01-01 RX ADMIN — AMIODARONE HYDROCHLORIDE 400 MG: 200 TABLET ORAL at 08:12

## 2020-01-01 RX ADMIN — ALBUTEROL SULFATE 2.5 MG: 2.5 SOLUTION RESPIRATORY (INHALATION) at 15:26

## 2020-01-01 RX ADMIN — MIDODRINE HYDROCHLORIDE 10 MG: 5 TABLET ORAL at 12:33

## 2020-01-01 RX ADMIN — INSULIN LISPRO 6 UNITS: 100 INJECTION, SOLUTION INTRAVENOUS; SUBCUTANEOUS at 12:01

## 2020-01-01 RX ADMIN — MIDODRINE HYDROCHLORIDE 5 MG: 5 TABLET ORAL at 12:33

## 2020-01-01 RX ADMIN — BACITRACIN: 500 OINTMENT TOPICAL at 08:39

## 2020-01-01 RX ADMIN — MIDAZOLAM 2 MG: 1 INJECTION INTRAMUSCULAR; INTRAVENOUS at 14:48

## 2020-01-01 RX ADMIN — INSULIN LISPRO 5 UNITS: 100 INJECTION, SOLUTION INTRAVENOUS; SUBCUTANEOUS at 12:45

## 2020-01-01 RX ADMIN — ALBUTEROL SULFATE 2.5 MG: 2.5 SOLUTION RESPIRATORY (INHALATION) at 01:21

## 2020-01-01 RX ADMIN — INSULIN LISPRO 12 UNITS: 100 INJECTION, SOLUTION INTRAVENOUS; SUBCUTANEOUS at 16:57

## 2020-01-01 RX ADMIN — MIDAZOLAM 1 MG: 1 INJECTION INTRAMUSCULAR; INTRAVENOUS at 03:13

## 2020-01-01 RX ADMIN — MIRTAZAPINE 15 MG: 15 TABLET, FILM COATED ORAL at 21:21

## 2020-01-01 RX ADMIN — BUSPIRONE HYDROCHLORIDE 10 MG: 5 TABLET ORAL at 21:10

## 2020-01-01 RX ADMIN — MEROPENEM 500 MG: 500 INJECTION, POWDER, FOR SOLUTION INTRAVENOUS at 03:48

## 2020-01-01 RX ADMIN — INSULIN GLARGINE 18 UNITS: 100 INJECTION, SOLUTION SUBCUTANEOUS at 22:00

## 2020-01-01 RX ADMIN — MIDODRINE HYDROCHLORIDE 5 MG: 5 TABLET ORAL at 08:36

## 2020-01-01 RX ADMIN — FUROSEMIDE 80 MG: 10 INJECTION, SOLUTION INTRAMUSCULAR; INTRAVENOUS at 01:38

## 2020-01-01 RX ADMIN — MIDODRINE HYDROCHLORIDE 5 MG: 5 TABLET ORAL at 12:59

## 2020-01-01 RX ADMIN — ALBUTEROL SULFATE 2.5 MG: 2.5 SOLUTION RESPIRATORY (INHALATION) at 19:08

## 2020-01-01 RX ADMIN — INSULIN LISPRO 2 UNITS: 100 INJECTION, SOLUTION INTRAVENOUS; SUBCUTANEOUS at 06:09

## 2020-01-01 RX ADMIN — IPRATROPIUM BROMIDE AND ALBUTEROL SULFATE 3 ML: .5; 3 SOLUTION RESPIRATORY (INHALATION) at 15:53

## 2020-01-01 RX ADMIN — INSULIN LISPRO 8 UNITS: 100 INJECTION, SOLUTION INTRAVENOUS; SUBCUTANEOUS at 11:27

## 2020-01-01 RX ADMIN — Medication 2 SPRAY: at 12:24

## 2020-01-01 RX ADMIN — MIRTAZAPINE 7.5 MG: 15 TABLET, FILM COATED ORAL at 21:33

## 2020-01-01 RX ADMIN — TAMSULOSIN HYDROCHLORIDE 0.4 MG: 0.4 CAPSULE ORAL at 08:36

## 2020-01-01 RX ADMIN — PROPOFOL 10 MG: 10 INJECTION, EMULSION INTRAVENOUS at 13:00

## 2020-01-01 RX ADMIN — Medication 2 SPRAY: at 08:31

## 2020-01-01 RX ADMIN — IPRATROPIUM BROMIDE AND ALBUTEROL SULFATE 3 ML: .5; 3 SOLUTION RESPIRATORY (INHALATION) at 07:18

## 2020-01-01 RX ADMIN — INSULIN LISPRO 2 UNITS: 100 INJECTION, SOLUTION INTRAVENOUS; SUBCUTANEOUS at 12:19

## 2020-01-01 RX ADMIN — WARFARIN SODIUM 6 MG: 5 TABLET ORAL at 16:47

## 2020-01-01 RX ADMIN — NOREPINEPHRINE BITARTRATE 0.2 MCG/KG/MIN: 1 INJECTION INTRAVENOUS at 04:25

## 2020-01-01 RX ADMIN — MIRTAZAPINE 15 MG: 30 TABLET, FILM COATED ORAL at 21:19

## 2020-01-01 RX ADMIN — MORPHINE SULFATE 5 MG: 10 INJECTION INTRAVENOUS at 22:37

## 2020-01-01 RX ADMIN — AZTREONAM 2 G: 2 INJECTION, POWDER, LYOPHILIZED, FOR SOLUTION INTRAMUSCULAR; INTRAVENOUS at 09:24

## 2020-01-01 RX ADMIN — FLUDROCORTISONE ACETATE 0.1 MG: 0.1 TABLET ORAL at 09:17

## 2020-01-01 RX ADMIN — ETOMIDATE 24 MG: 2 INJECTION, SOLUTION INTRAVENOUS at 07:44

## 2020-01-01 RX ADMIN — SODIUM CHLORIDE 0.25 MCG/KG/MIN: 900 INJECTION, SOLUTION INTRAVENOUS at 03:21

## 2020-01-01 RX ADMIN — WARFARIN SODIUM 2.5 MG: 2.5 TABLET ORAL at 16:43

## 2020-01-01 RX ADMIN — Medication 10 ML: at 05:49

## 2020-01-01 RX ADMIN — Medication 10 ML: at 14:38

## 2020-01-01 RX ADMIN — EPINEPHRINE 0.04 MCG/KG/MIN: 1 INJECTION PARENTERAL at 09:23

## 2020-01-01 RX ADMIN — Medication 5 ML: at 21:29

## 2020-01-01 RX ADMIN — MIDODRINE HYDROCHLORIDE 10 MG: 5 TABLET ORAL at 08:26

## 2020-01-01 RX ADMIN — Medication 5 ML: at 13:45

## 2020-01-01 RX ADMIN — INSULIN LISPRO 4 UNITS: 100 INJECTION, SOLUTION INTRAVENOUS; SUBCUTANEOUS at 06:54

## 2020-01-01 RX ADMIN — SODIUM CHLORIDE 0.5 MCG/KG/MIN: 900 INJECTION, SOLUTION INTRAVENOUS at 18:57

## 2020-01-01 RX ADMIN — DEXTROSE MONOHYDRATE 11 MCG/MIN: 5 INJECTION, SOLUTION INTRAVENOUS at 01:25

## 2020-01-01 RX ADMIN — ACETAMINOPHEN 650 MG: 325 TABLET, FILM COATED ORAL at 06:27

## 2020-01-01 RX ADMIN — Medication 10 ML: at 14:46

## 2020-01-01 RX ADMIN — MEROPENEM 500 MG: 500 INJECTION, POWDER, FOR SOLUTION INTRAVENOUS at 04:59

## 2020-01-01 RX ADMIN — Medication 10 ML: at 21:39

## 2020-01-01 RX ADMIN — SODIUM CHLORIDE, SODIUM LACTATE, POTASSIUM CHLORIDE, AND CALCIUM CHLORIDE: 600; 310; 30; 20 INJECTION, SOLUTION INTRAVENOUS at 07:31

## 2020-01-01 RX ADMIN — Medication 10 ML: at 02:58

## 2020-01-01 RX ADMIN — EPINEPHRINE 0.01 MCG/KG/MIN: 1 INJECTION INTRAMUSCULAR; INTRAVENOUS; SUBCUTANEOUS at 08:15

## 2020-01-01 RX ADMIN — MEROPENEM 500 MG: 500 INJECTION, POWDER, FOR SOLUTION INTRAVENOUS at 04:29

## 2020-01-01 RX ADMIN — ACETAMINOPHEN 650 MG: 325 TABLET, FILM COATED ORAL at 09:17

## 2020-01-01 RX ADMIN — CHLORHEXIDINE GLUCONATE 10 ML: 1.2 RINSE ORAL at 08:25

## 2020-01-01 RX ADMIN — PERFLUTREN 1 ML: 6.52 INJECTION, SUSPENSION INTRAVENOUS at 10:00

## 2020-01-01 RX ADMIN — ALPRAZOLAM 0.5 MG: 0.5 TABLET ORAL at 23:39

## 2020-01-01 RX ADMIN — FAMOTIDINE 20 MG: 20 TABLET, FILM COATED ORAL at 09:14

## 2020-01-01 RX ADMIN — NITROGLYCERIN 1 INCH: 20 OINTMENT TOPICAL at 21:34

## 2020-01-01 RX ADMIN — Medication 2 SPRAY: at 12:07

## 2020-01-01 RX ADMIN — MIDODRINE HYDROCHLORIDE 10 MG: 5 TABLET ORAL at 10:02

## 2020-01-01 RX ADMIN — SERTRALINE HYDROCHLORIDE 25 MG: 50 TABLET ORAL at 09:25

## 2020-01-01 RX ADMIN — BUPROPION HYDROCHLORIDE 75 MG: 75 TABLET, FILM COATED ORAL at 17:31

## 2020-01-01 RX ADMIN — GUAIFENESIN 1200 MG: 600 TABLET ORAL at 09:00

## 2020-01-01 RX ADMIN — TUBERCULIN PURIFIED PROTEIN DERIVATIVE 5 UNITS: 5 INJECTION, SOLUTION INTRADERMAL at 20:43

## 2020-01-01 RX ADMIN — HYDROCODONE BITARTRATE AND ACETAMINOPHEN 1 TABLET: 5; 325 TABLET ORAL at 06:22

## 2020-01-01 RX ADMIN — GUAIFENESIN 1200 MG: 600 TABLET ORAL at 21:28

## 2020-01-01 RX ADMIN — MIDAZOLAM 1 MG: 1 INJECTION INTRAMUSCULAR; INTRAVENOUS at 04:34

## 2020-01-01 RX ADMIN — Medication 5 ML: at 06:06

## 2020-01-01 RX ADMIN — TAMSULOSIN HYDROCHLORIDE 0.4 MG: 0.4 CAPSULE ORAL at 09:09

## 2020-01-01 RX ADMIN — BACITRACIN: 500 OINTMENT TOPICAL at 18:00

## 2020-01-01 RX ADMIN — ALBUTEROL SULFATE 2.5 MG: 2.5 SOLUTION RESPIRATORY (INHALATION) at 13:33

## 2020-01-01 RX ADMIN — INSULIN GLARGINE 25 UNITS: 100 INJECTION, SOLUTION SUBCUTANEOUS at 21:21

## 2020-01-01 RX ADMIN — BUPROPION HYDROCHLORIDE 75 MG: 75 TABLET, FILM COATED ORAL at 09:17

## 2020-01-01 RX ADMIN — INSULIN LISPRO 2 UNITS: 100 INJECTION, SOLUTION INTRAVENOUS; SUBCUTANEOUS at 22:24

## 2020-01-01 RX ADMIN — MIDODRINE HYDROCHLORIDE 10 MG: 5 TABLET ORAL at 21:03

## 2020-01-01 RX ADMIN — SODIUM CHLORIDE 2 UNITS/HR: 900 INJECTION, SOLUTION INTRAVENOUS at 03:45

## 2020-01-01 RX ADMIN — INSULIN GLARGINE 5 UNITS: 100 INJECTION, SOLUTION SUBCUTANEOUS at 21:34

## 2020-01-01 RX ADMIN — INSULIN GLARGINE 25 UNITS: 100 INJECTION, SOLUTION SUBCUTANEOUS at 22:00

## 2020-01-01 RX ADMIN — MIDODRINE HYDROCHLORIDE 10 MG: 5 TABLET ORAL at 09:20

## 2020-01-01 RX ADMIN — VANCOMYCIN HYDROCHLORIDE 2000 MG: 10 INJECTION, POWDER, LYOPHILIZED, FOR SOLUTION INTRAVENOUS at 05:38

## 2020-01-01 RX ADMIN — VASOPRESSIN 1 UNITS: 20 INJECTION INTRAVENOUS at 10:05

## 2020-01-01 RX ADMIN — FUROSEMIDE 40 MG: 40 TABLET ORAL at 09:07

## 2020-01-01 RX ADMIN — INSULIN LISPRO 15 UNITS: 100 INJECTION, SOLUTION INTRAVENOUS; SUBCUTANEOUS at 09:23

## 2020-01-01 RX ADMIN — ALPRAZOLAM 0.5 MG: 0.5 TABLET ORAL at 12:58

## 2020-01-01 RX ADMIN — BUSPIRONE HYDROCHLORIDE 10 MG: 10 TABLET ORAL at 18:02

## 2020-01-01 RX ADMIN — MEROPENEM 500 MG: 500 INJECTION, POWDER, FOR SOLUTION INTRAVENOUS at 20:51

## 2020-01-01 RX ADMIN — ALBUTEROL SULFATE 2.5 MG: 2.5 SOLUTION RESPIRATORY (INHALATION) at 15:12

## 2020-01-01 RX ADMIN — WARFARIN SODIUM 7.5 MG: 5 TABLET ORAL at 17:18

## 2020-01-01 RX ADMIN — Medication 5 ML: at 15:41

## 2020-01-01 RX ADMIN — OLANZAPINE 5 MG: 5 TABLET, FILM COATED ORAL at 21:30

## 2020-01-01 RX ADMIN — MUPIROCIN: 20 OINTMENT TOPICAL at 09:50

## 2020-01-01 RX ADMIN — SODIUM CHLORIDE 0.5 MCG/KG/MIN: 900 INJECTION, SOLUTION INTRAVENOUS at 20:00

## 2020-01-01 RX ADMIN — BUSPIRONE HYDROCHLORIDE 10 MG: 10 TABLET ORAL at 21:18

## 2020-01-01 RX ADMIN — TAMSULOSIN HYDROCHLORIDE 0.4 MG: 0.4 CAPSULE ORAL at 21:55

## 2020-01-01 RX ADMIN — FUROSEMIDE 40 MG: 40 TABLET ORAL at 08:32

## 2020-01-01 RX ADMIN — MIDAZOLAM 1 MG: 1 INJECTION INTRAMUSCULAR; INTRAVENOUS at 17:51

## 2020-01-01 RX ADMIN — WARFARIN SODIUM 5 MG: 5 TABLET ORAL at 17:54

## 2020-01-01 RX ADMIN — INSULIN LISPRO 2 UNITS: 100 INJECTION, SOLUTION INTRAVENOUS; SUBCUTANEOUS at 18:00

## 2020-01-01 RX ADMIN — TRAZODONE HYDROCHLORIDE 100 MG: 50 TABLET ORAL at 21:18

## 2020-01-01 RX ADMIN — FAMOTIDINE 20 MG: 10 INJECTION INTRAVENOUS at 09:13

## 2020-01-01 RX ADMIN — EPINEPHRINE 0.03 MCG/KG/MIN: 1 INJECTION PARENTERAL at 13:49

## 2020-01-01 RX ADMIN — Medication 10 ML: at 22:21

## 2020-01-01 RX ADMIN — AMIODARONE HYDROCHLORIDE 400 MG: 200 TABLET ORAL at 20:03

## 2020-01-01 RX ADMIN — ALPRAZOLAM 0.25 MG: 0.25 TABLET ORAL at 21:14

## 2020-01-01 RX ADMIN — FENTANYL CITRATE 50 MCG: 50 INJECTION, SOLUTION INTRAMUSCULAR; INTRAVENOUS at 12:14

## 2020-01-01 RX ADMIN — VASOPRESSIN 0.1 UNITS/MIN: 20 INJECTION INTRAVENOUS at 15:50

## 2020-01-01 RX ADMIN — Medication 10 ML: at 21:17

## 2020-01-01 RX ADMIN — HUMAN INSULIN 4 UNITS: 100 INJECTION, SOLUTION SUBCUTANEOUS at 23:32

## 2020-01-01 RX ADMIN — Medication 10 ML: at 21:31

## 2020-01-01 RX ADMIN — NITROGLYCERIN 1 INCH: 20 OINTMENT TOPICAL at 22:00

## 2020-01-01 RX ADMIN — Medication 5 ML: at 05:18

## 2020-01-01 RX ADMIN — MIDODRINE HYDROCHLORIDE 10 MG: 5 TABLET ORAL at 08:27

## 2020-01-01 RX ADMIN — MIDODRINE HYDROCHLORIDE 10 MG: 5 TABLET ORAL at 08:05

## 2020-01-01 RX ADMIN — POTASSIUM CHLORIDE 10 MEQ: 750 TABLET, EXTENDED RELEASE ORAL at 08:27

## 2020-01-01 RX ADMIN — INSULIN LISPRO 4 UNITS: 100 INJECTION, SOLUTION INTRAVENOUS; SUBCUTANEOUS at 21:09

## 2020-01-01 RX ADMIN — FENTANYL CITRATE 50 MCG: 50 INJECTION INTRAMUSCULAR; INTRAVENOUS at 10:22

## 2020-01-01 RX ADMIN — Medication 2 SPRAY: at 17:03

## 2020-01-01 RX ADMIN — FUROSEMIDE 80 MG: 10 INJECTION INTRAMUSCULAR; INTRAVENOUS at 11:58

## 2020-01-01 RX ADMIN — Medication: at 12:01

## 2020-01-01 RX ADMIN — INSULIN LISPRO 8 UNITS: 100 INJECTION, SOLUTION INTRAVENOUS; SUBCUTANEOUS at 17:33

## 2020-01-01 RX ADMIN — BACITRACIN: 500 OINTMENT TOPICAL at 08:25

## 2020-01-01 RX ADMIN — MORPHINE SULFATE 4 MG: 10 INJECTION INTRAVENOUS at 21:54

## 2020-01-01 RX ADMIN — Medication 10 ML: at 13:17

## 2020-01-01 RX ADMIN — ALBUTEROL SULFATE 2.5 MG: 2.5 SOLUTION RESPIRATORY (INHALATION) at 21:21

## 2020-01-01 RX ADMIN — ALBUTEROL SULFATE 2.5 MG: 2.5 SOLUTION RESPIRATORY (INHALATION) at 20:11

## 2020-01-01 RX ADMIN — GUAIFENESIN 1200 MG: 600 TABLET ORAL at 08:56

## 2020-01-01 RX ADMIN — MIDODRINE HYDROCHLORIDE 10 MG: 5 TABLET ORAL at 09:15

## 2020-01-01 RX ADMIN — ALBUTEROL SULFATE 2.5 MG: 2.5 SOLUTION RESPIRATORY (INHALATION) at 23:58

## 2020-01-01 RX ADMIN — ALBUMIN (HUMAN) 25 G: 12.5 INJECTION, SOLUTION INTRAVENOUS at 15:39

## 2020-01-01 RX ADMIN — LIDOCAINE HYDROCHLORIDE 100 MG: 10; .005 INJECTION, SOLUTION EPIDURAL; INFILTRATION; INTRACAUDAL; PERINEURAL at 10:57

## 2020-01-01 RX ADMIN — ALBUTEROL SULFATE 2.5 MG: 2.5 SOLUTION RESPIRATORY (INHALATION) at 20:41

## 2020-01-01 RX ADMIN — IPRATROPIUM BROMIDE AND ALBUTEROL SULFATE 3 ML: .5; 3 SOLUTION RESPIRATORY (INHALATION) at 08:35

## 2020-01-01 RX ADMIN — Medication 1 AMPULE: at 08:22

## 2020-01-01 RX ADMIN — VANCOMYCIN HYDROCHLORIDE 750 MG: 10 INJECTION, POWDER, LYOPHILIZED, FOR SOLUTION INTRAVENOUS at 21:56

## 2020-01-01 RX ADMIN — DEXMEDETOMIDINE 0.6 MCG/KG/HR: 100 INJECTION, SOLUTION, CONCENTRATE INTRAVENOUS at 13:44

## 2020-01-01 RX ADMIN — Medication 5 MG: at 08:03

## 2020-01-01 RX ADMIN — MIDODRINE HYDROCHLORIDE 10 MG: 5 TABLET ORAL at 11:51

## 2020-01-01 RX ADMIN — INSULIN HUMAN 15 UNITS: 100 INJECTION, SOLUTION PARENTERAL at 16:57

## 2020-01-01 RX ADMIN — ALBUTEROL SULFATE 2.5 MG: 2.5 SOLUTION RESPIRATORY (INHALATION) at 01:59

## 2020-01-01 RX ADMIN — VASOPRESSIN 0.5 UNITS: 20 INJECTION INTRAVENOUS at 08:13

## 2020-01-01 RX ADMIN — IPRATROPIUM BROMIDE AND ALBUTEROL SULFATE 3 ML: .5; 3 SOLUTION RESPIRATORY (INHALATION) at 01:40

## 2020-01-01 RX ADMIN — INSULIN LISPRO 6 UNITS: 100 INJECTION, SOLUTION INTRAVENOUS; SUBCUTANEOUS at 21:59

## 2020-01-01 RX ADMIN — DEXMEDETOMIDINE 0.7 MCG/KG/HR: 100 INJECTION, SOLUTION, CONCENTRATE INTRAVENOUS at 00:49

## 2020-01-01 RX ADMIN — SODIUM CHLORIDE: 900 INJECTION, SOLUTION INTRAVENOUS at 13:07

## 2020-01-01 RX ADMIN — CHLORHEXIDINE GLUCONATE 10 ML: 1.2 RINSE ORAL at 08:31

## 2020-01-01 RX ADMIN — ALBUTEROL SULFATE 2.5 MG: 2.5 SOLUTION RESPIRATORY (INHALATION) at 13:12

## 2020-01-01 RX ADMIN — Medication 1 AMPULE: at 22:17

## 2020-01-01 RX ADMIN — INSULIN LISPRO 6 UNITS: 100 INJECTION, SOLUTION INTRAVENOUS; SUBCUTANEOUS at 11:44

## 2020-01-01 RX ADMIN — WARFARIN SODIUM 6 MG: 5 TABLET ORAL at 17:21

## 2020-01-01 RX ADMIN — NOREPINEPHRINE BITARTRATE 0.2 MCG/KG/MIN: 1 INJECTION INTRAVENOUS at 13:50

## 2020-01-01 RX ADMIN — BUSPIRONE HYDROCHLORIDE 10 MG: 5 TABLET ORAL at 16:30

## 2020-01-01 RX ADMIN — BUSPIRONE HYDROCHLORIDE 10 MG: 10 TABLET ORAL at 16:26

## 2020-01-01 RX ADMIN — Medication 10 ML: at 22:17

## 2020-01-01 RX ADMIN — ALBUTEROL SULFATE 2.5 MG: 2.5 SOLUTION RESPIRATORY (INHALATION) at 13:48

## 2020-01-01 RX ADMIN — GUAIFENESIN 1200 MG: 600 TABLET ORAL at 21:18

## 2020-01-01 RX ADMIN — MIRTAZAPINE 15 MG: 15 TABLET, FILM COATED ORAL at 21:23

## 2020-01-01 RX ADMIN — INSULIN GLARGINE 28 UNITS: 100 INJECTION, SOLUTION SUBCUTANEOUS at 07:45

## 2020-01-01 RX ADMIN — INSULIN LISPRO 2 UNITS: 100 INJECTION, SOLUTION INTRAVENOUS; SUBCUTANEOUS at 17:51

## 2020-01-01 RX ADMIN — ALBUTEROL SULFATE 2.5 MG: 2.5 SOLUTION RESPIRATORY (INHALATION) at 02:18

## 2020-01-01 RX ADMIN — Medication 1 AMPULE: at 21:15

## 2020-01-01 RX ADMIN — Medication 0.13 MCG/KG/MIN: at 15:46

## 2020-01-01 RX ADMIN — MIRTAZAPINE 15 MG: 15 TABLET, FILM COATED ORAL at 22:51

## 2020-01-01 RX ADMIN — WARFARIN SODIUM 6 MG: 5 TABLET ORAL at 16:39

## 2020-01-01 RX ADMIN — INSULIN LISPRO 4 UNITS: 100 INJECTION, SOLUTION INTRAVENOUS; SUBCUTANEOUS at 17:57

## 2020-01-01 RX ADMIN — SODIUM CHLORIDE 0.25 MCG/KG/MIN: 900 INJECTION, SOLUTION INTRAVENOUS at 04:55

## 2020-01-01 RX ADMIN — BUSPIRONE HYDROCHLORIDE 10 MG: 10 TABLET ORAL at 22:02

## 2020-01-01 RX ADMIN — IPRATROPIUM BROMIDE AND ALBUTEROL SULFATE 3 ML: .5; 3 SOLUTION RESPIRATORY (INHALATION) at 11:37

## 2020-01-01 RX ADMIN — INSULIN GLARGINE 18 UNITS: 100 INJECTION, SOLUTION SUBCUTANEOUS at 21:46

## 2020-01-01 RX ADMIN — ALBUTEROL SULFATE 2.5 MG: 2.5 SOLUTION RESPIRATORY (INHALATION) at 22:20

## 2020-01-01 RX ADMIN — Medication 2 SPRAY: at 17:32

## 2020-01-01 RX ADMIN — INSULIN LISPRO 6 UNITS: 100 INJECTION, SOLUTION INTRAVENOUS; SUBCUTANEOUS at 16:08

## 2020-01-01 RX ADMIN — Medication: at 08:20

## 2020-01-01 RX ADMIN — BACITRACIN: 500 OINTMENT TOPICAL at 10:03

## 2020-01-01 RX ADMIN — Medication 0.08 MCG/KG/MIN: at 00:47

## 2020-01-01 RX ADMIN — BUSPIRONE HYDROCHLORIDE 5 MG: 5 TABLET ORAL at 21:28

## 2020-01-01 RX ADMIN — Medication 1 AMPULE: at 20:02

## 2020-01-01 RX ADMIN — LIDOCAINE HYDROCHLORIDE 0.5 ML: 10 INJECTION, SOLUTION INFILTRATION; PERINEURAL at 07:37

## 2020-01-01 RX ADMIN — LOPERAMIDE HYDROCHLORIDE 4 MG: 2 CAPSULE ORAL at 11:47

## 2020-01-01 RX ADMIN — NITROGLYCERIN 1 INCH: 20 OINTMENT TOPICAL at 12:04

## 2020-01-01 RX ADMIN — INSULIN LISPRO 9 UNITS: 100 INJECTION, SOLUTION INTRAVENOUS; SUBCUTANEOUS at 11:53

## 2020-01-01 RX ADMIN — TRAMADOL HYDROCHLORIDE 50 MG: 50 TABLET, FILM COATED ORAL at 10:17

## 2020-01-01 RX ADMIN — FUROSEMIDE 20 MG/HR: 10 INJECTION, SOLUTION INTRAMUSCULAR; INTRAVENOUS at 14:50

## 2020-01-01 RX ADMIN — Medication 5 ML: at 15:38

## 2020-01-01 RX ADMIN — ALPRAZOLAM 0.25 MG: 0.25 TABLET ORAL at 01:43

## 2020-01-01 RX ADMIN — TAMSULOSIN HYDROCHLORIDE 0.4 MG: 0.4 CAPSULE ORAL at 12:58

## 2020-01-01 RX ADMIN — ATORVASTATIN CALCIUM 80 MG: 40 TABLET, FILM COATED ORAL at 21:18

## 2020-01-01 RX ADMIN — DEXMEDETOMIDINE 0.7 MCG/KG/HR: 100 INJECTION, SOLUTION, CONCENTRATE INTRAVENOUS at 10:25

## 2020-01-01 RX ADMIN — FUROSEMIDE 40 MG: 10 INJECTION, SOLUTION INTRAMUSCULAR; INTRAVENOUS at 08:26

## 2020-01-01 RX ADMIN — NOREPINEPHRINE BITARTRATE 3 MCG: 1 INJECTION, SOLUTION, CONCENTRATE INTRAVENOUS at 10:10

## 2020-01-01 RX ADMIN — Medication 10 ML: at 15:40

## 2020-01-01 RX ADMIN — MORPHINE SULFATE 5 MG: 10 INJECTION INTRAVENOUS at 01:15

## 2020-01-01 RX ADMIN — MEPIVACAINE HYDROCHLORIDE 20 ML: 20 INJECTION, SOLUTION EPIDURAL; INFILTRATION at 12:11

## 2020-01-01 RX ADMIN — INSULIN LISPRO 6 UNITS: 100 INJECTION, SOLUTION INTRAVENOUS; SUBCUTANEOUS at 11:46

## 2020-01-01 RX ADMIN — Medication 1 AMPULE: at 09:26

## 2020-01-01 RX ADMIN — SODIUM CHLORIDE 100 ML: 900 INJECTION, SOLUTION INTRAVENOUS at 00:04

## 2020-01-01 RX ADMIN — NOREPINEPHRINE BITARTRATE 0.18 MCG/KG/MIN: 1 INJECTION, SOLUTION, CONCENTRATE INTRAVENOUS at 02:35

## 2020-01-01 RX ADMIN — SODIUM CHLORIDE 0.5 MCG/KG/MIN: 900 INJECTION, SOLUTION INTRAVENOUS at 06:38

## 2020-01-01 RX ADMIN — ATORVASTATIN CALCIUM 80 MG: 40 TABLET, FILM COATED ORAL at 21:28

## 2020-01-01 RX ADMIN — ONDANSETRON 4 MG: 2 INJECTION INTRAMUSCULAR; INTRAVENOUS at 11:12

## 2020-01-01 RX ADMIN — PHENYLEPHRINE HYDROCHLORIDE 120 MCG: 10 INJECTION INTRAVENOUS at 10:03

## 2020-01-01 RX ADMIN — BUSPIRONE HYDROCHLORIDE 10 MG: 10 TABLET ORAL at 08:21

## 2020-01-01 RX ADMIN — TAMSULOSIN HYDROCHLORIDE 0.4 MG: 0.4 CAPSULE ORAL at 09:07

## 2020-01-01 RX ADMIN — CHLORHEXIDINE GLUCONATE 10 ML: 1.2 RINSE ORAL at 09:26

## 2020-01-01 RX ADMIN — CEFEPIME HYDROCHLORIDE 2 G: 2 INJECTION, POWDER, FOR SOLUTION INTRAVENOUS at 21:29

## 2020-01-01 RX ADMIN — INSULIN LISPRO 2 UNITS: 100 INJECTION, SOLUTION INTRAVENOUS; SUBCUTANEOUS at 11:16

## 2020-01-01 RX ADMIN — TAMSULOSIN HYDROCHLORIDE 0.4 MG: 0.4 CAPSULE ORAL at 21:52

## 2020-01-01 RX ADMIN — ALBUTEROL SULFATE 2.5 MG: 2.5 SOLUTION RESPIRATORY (INHALATION) at 19:50

## 2020-01-01 RX ADMIN — ATORVASTATIN CALCIUM 80 MG: 40 TABLET, FILM COATED ORAL at 20:49

## 2020-01-01 RX ADMIN — METRONIDAZOLE 500 MG: 500 INJECTION, SOLUTION INTRAVENOUS at 21:29

## 2020-01-01 RX ADMIN — BUPROPION HYDROCHLORIDE 75 MG: 75 TABLET, FILM COATED ORAL at 17:11

## 2020-01-01 RX ADMIN — MORPHINE SULFATE 3 MG: 10 INJECTION INTRAVENOUS at 02:22

## 2020-01-01 RX ADMIN — INSULIN LISPRO 6 UNITS: 100 INJECTION, SOLUTION INTRAVENOUS; SUBCUTANEOUS at 22:18

## 2020-01-01 RX ADMIN — INSULIN LISPRO 4 UNITS: 100 INJECTION, SOLUTION INTRAVENOUS; SUBCUTANEOUS at 11:18

## 2020-01-01 RX ADMIN — INSULIN LISPRO 6 UNITS: 100 INJECTION, SOLUTION INTRAVENOUS; SUBCUTANEOUS at 10:51

## 2020-01-01 RX ADMIN — BUSPIRONE HYDROCHLORIDE 10 MG: 5 TABLET ORAL at 16:39

## 2020-01-01 RX ADMIN — Medication 10 MG: at 11:41

## 2020-01-01 RX ADMIN — LIDOCAINE HYDROCHLORIDE: 20 JELLY TOPICAL at 12:13

## 2020-01-01 RX ADMIN — SODIUM CHLORIDE SOLN NEBU 3% 4 ML: 3 NEBU SOLN at 07:44

## 2020-01-01 RX ADMIN — BUPROPION HYDROCHLORIDE 75 MG: 75 TABLET, FILM COATED ORAL at 21:58

## 2020-01-01 RX ADMIN — CEFEPIME HYDROCHLORIDE 2 G: 2 INJECTION, POWDER, FOR SOLUTION INTRAVENOUS at 08:27

## 2020-01-01 RX ADMIN — CLINDAMYCIN IN 5 PERCENT DEXTROSE 600 MG: 12 INJECTION, SOLUTION INTRAVENOUS at 05:54

## 2020-01-01 RX ADMIN — MEROPENEM 500 MG: 500 INJECTION, POWDER, FOR SOLUTION INTRAVENOUS at 03:30

## 2020-01-01 RX ADMIN — CEFEPIME HYDROCHLORIDE 2 G: 2 INJECTION, POWDER, FOR SOLUTION INTRAVENOUS at 21:14

## 2020-01-01 RX ADMIN — MIRTAZAPINE 15 MG: 15 TABLET, FILM COATED ORAL at 20:52

## 2020-01-01 RX ADMIN — Medication 10 ML: at 21:43

## 2020-01-01 RX ADMIN — INSULIN GLARGINE 45 UNITS: 100 INJECTION, SOLUTION SUBCUTANEOUS at 21:41

## 2020-01-01 RX ADMIN — MIDAZOLAM 1 MG: 1 INJECTION INTRAMUSCULAR; INTRAVENOUS at 03:21

## 2020-01-01 RX ADMIN — SODIUM CHLORIDE 25 ML/HR: 900 INJECTION, SOLUTION INTRAVENOUS at 14:15

## 2020-01-01 RX ADMIN — MIDAZOLAM 1 MG: 1 INJECTION INTRAMUSCULAR; INTRAVENOUS at 09:48

## 2020-01-01 RX ADMIN — Medication 1 AMPULE: at 21:07

## 2020-01-01 RX ADMIN — MIDAZOLAM 3 MG: 1 INJECTION INTRAMUSCULAR; INTRAVENOUS at 08:09

## 2020-01-01 RX ADMIN — VASOPRESSIN 0.01 UNITS/MIN: 20 INJECTION INTRAVENOUS at 07:00

## 2020-01-01 RX ADMIN — Medication 5 MG: at 08:00

## 2020-01-01 RX ADMIN — Medication 20 ML: at 14:14

## 2020-01-01 RX ADMIN — ATORVASTATIN CALCIUM 80 MG: 40 TABLET, FILM COATED ORAL at 22:20

## 2020-01-01 RX ADMIN — ETOMIDATE 15 MG: 2 INJECTION, SOLUTION INTRAVENOUS at 10:57

## 2020-01-01 RX ADMIN — INSULIN LISPRO 3 UNITS: 100 INJECTION, SOLUTION INTRAVENOUS; SUBCUTANEOUS at 17:43

## 2020-01-01 RX ADMIN — Medication 5 ML: at 06:00

## 2020-01-01 RX ADMIN — INSULIN LISPRO 10 UNITS: 100 INJECTION, SOLUTION INTRAVENOUS; SUBCUTANEOUS at 08:35

## 2020-01-01 RX ADMIN — ALBUMIN HUMAN 25 G: 50 SOLUTION INTRAVENOUS at 17:17

## 2020-01-01 RX ADMIN — INSULIN HUMAN 10 UNITS: 100 INJECTION, SOLUTION PARENTERAL at 17:32

## 2020-01-01 RX ADMIN — OLANZAPINE 5 MG: 5 TABLET, FILM COATED ORAL at 21:35

## 2020-01-01 RX ADMIN — INSULIN LISPRO 2 UNITS: 100 INJECTION, SOLUTION INTRAVENOUS; SUBCUTANEOUS at 11:27

## 2020-01-01 RX ADMIN — LEVOFLOXACIN 500 MG: 500 TABLET, FILM COATED ORAL at 22:20

## 2020-01-01 RX ADMIN — WARFARIN SODIUM 5 MG: 5 TABLET ORAL at 17:00

## 2020-01-01 RX ADMIN — INSULIN LISPRO 2 UNITS: 100 INJECTION, SOLUTION INTRAVENOUS; SUBCUTANEOUS at 19:54

## 2020-01-01 RX ADMIN — BUSPIRONE HYDROCHLORIDE 10 MG: 10 TABLET ORAL at 21:49

## 2020-01-01 RX ADMIN — TAMSULOSIN HYDROCHLORIDE 0.4 MG: 0.4 CAPSULE ORAL at 21:56

## 2020-01-01 RX ADMIN — FUROSEMIDE 60 MG: 10 INJECTION, SOLUTION INTRAVENOUS at 18:46

## 2020-01-01 RX ADMIN — DIPHENHYDRAMINE HYDROCHLORIDE 25 MG: 25 CAPSULE ORAL at 21:36

## 2020-01-01 RX ADMIN — ASPIRIN 81 MG 81 MG: 81 TABLET ORAL at 09:49

## 2020-01-01 RX ADMIN — ASPIRIN 81 MG 81 MG: 81 TABLET ORAL at 09:13

## 2020-01-01 RX ADMIN — BUSPIRONE HYDROCHLORIDE 10 MG: 5 TABLET ORAL at 08:11

## 2020-01-01 RX ADMIN — SODIUM CHLORIDE SOLN NEBU 3% 4 ML: 3 NEBU SOLN at 21:00

## 2020-01-01 RX ADMIN — TRAZODONE HYDROCHLORIDE 100 MG: 50 TABLET ORAL at 22:54

## 2020-01-01 RX ADMIN — MUPIROCIN: 20 OINTMENT TOPICAL at 17:00

## 2020-01-01 RX ADMIN — BUSPIRONE HYDROCHLORIDE 10 MG: 10 TABLET ORAL at 08:41

## 2020-01-01 RX ADMIN — VASOPRESSIN 0.07 UNITS/MIN: 20 INJECTION INTRAVENOUS at 16:12

## 2020-01-01 RX ADMIN — WARFARIN SODIUM 4 MG: 2 TABLET ORAL at 17:29

## 2020-01-01 RX ADMIN — SODIUM CHLORIDE 0.5 MCG/KG/MIN: 900 INJECTION, SOLUTION INTRAVENOUS at 15:56

## 2020-01-01 RX ADMIN — INSULIN GLARGINE 45 UNITS: 100 INJECTION, SOLUTION SUBCUTANEOUS at 21:36

## 2020-01-01 RX ADMIN — FAMOTIDINE 20 MG: 40 POWDER, FOR SUSPENSION ORAL at 10:15

## 2020-01-01 RX ADMIN — ALBUTEROL SULFATE 2.5 MG: 2.5 SOLUTION RESPIRATORY (INHALATION) at 08:59

## 2020-01-01 RX ADMIN — INSULIN GLARGINE 30 UNITS: 100 INJECTION, SOLUTION SUBCUTANEOUS at 22:12

## 2020-01-08 NOTE — PERIOP NOTES
Called and left message for Enrike [place] for Dr Francisca Oneil regarding ekg today showing inferior ischemia.

## 2020-01-08 NOTE — PERIOP NOTES
Recent Results (from the past 12 hour(s))   TYPE + CROSSMATCH    Collection Time: 01/08/20  8:28 AM   Result Value Ref Range    Crossmatch Expiration 01/11/2020     ABO/Rh(D) AB POSITIVE     Antibody screen NEG    CBC W/O DIFF    Collection Time: 01/08/20  8:28 AM   Result Value Ref Range    WBC 6.9 4.3 - 11.1 K/uL    RBC 3.41 (L) 4.23 - 5.6 M/uL    HGB 10.9 (L) 13.6 - 17.2 g/dL    HCT 33.4 (L) 41.1 - 50.3 %    MCV 97.9 (H) 79.6 - 97.8 FL    MCH 32.0 26.1 - 32.9 PG    MCHC 32.6 31.4 - 35.0 g/dL    RDW 12.4 11.9 - 14.6 %    PLATELET 777 184 - 147 K/uL    MPV 10.2 9.4 - 12.3 FL    ABSOLUTE NRBC 0.00 0.0 - 0.2 K/uL   HEMOGLOBIN A1C WITH EAG    Collection Time: 01/08/20  8:28 AM   Result Value Ref Range    Hemoglobin A1c 7.6 (H) 4.8 - 6.0 %    Est. average glucose 506 mg/dL   METABOLIC PANEL, COMPREHENSIVE    Collection Time: 01/08/20  8:28 AM   Result Value Ref Range    Sodium 139 136 - 145 mmol/L    Potassium 4.4 3.5 - 5.1 mmol/L    Chloride 106 98 - 107 mmol/L    CO2 28 21 - 32 mmol/L    Anion gap 5 (L) 7 - 16 mmol/L    Glucose 230 (H) 65 - 100 mg/dL    BUN 29 (H) 8 - 23 MG/DL    Creatinine 1.90 (H) 0.8 - 1.5 MG/DL    GFR est AA 45 (L) >60 ml/min/1.73m2    GFR est non-AA 37 (L) >60 ml/min/1.73m2    Calcium 9.2 8.3 - 10.4 MG/DL    Bilirubin, total 0.3 0.2 - 1.1 MG/DL    ALT (SGPT) 20 12 - 65 U/L    AST (SGOT) 14 (L) 15 - 37 U/L    Alk.  phosphatase 95 50 - 136 U/L    Protein, total 7.2 6.3 - 8.2 g/dL    Albumin 3.3 3.2 - 4.6 g/dL    Globulin 3.9 (H) 2.3 - 3.5 g/dL    A-G Ratio 0.8 (L) 1.2 - 3.5     PROTHROMBIN TIME + INR    Collection Time: 01/08/20  8:28 AM   Result Value Ref Range    Prothrombin time 12.9 11.7 - 14.5 sec    INR 0.9     PTT    Collection Time: 01/08/20  8:28 AM   Result Value Ref Range    aPTT 25.9 24.7 - 39.8 SEC   URINALYSIS W/ RFLX MICROSCOPIC    Collection Time: 01/08/20  8:28 AM   Result Value Ref Range    Color YELLOW      Appearance CLEAR      Specific gravity 1.020 1.001 - 1.023      pH (UA) 5.5 5.0 - 9.0      Protein NEGATIVE  NEG mg/dL    Glucose NEGATIVE  mg/dL    Ketone NEGATIVE  NEG mg/dL    Bilirubin NEGATIVE  NEG      Blood NEGATIVE  NEG      Urobilinogen 0.2 0.2 - 1.0 EU/dL    Nitrites NEGATIVE  NEG      Leukocyte Esterase NEGATIVE  NEG     MAGNESIUM    Collection Time: 01/08/20  8:28 AM   Result Value Ref Range    Magnesium 2.0 1.8 - 2.4 mg/dL   MSSA/MRSA SC BY PCR, NASAL SWAB    Collection Time: 01/08/20  8:28 AM   Result Value Ref Range    Special Requests: NO SPECIAL REQUESTS      Culture result:        SA target not detected. A MRSA NEGATIVE, SA NEGATIVE test result does not preclude MRSA or SA nasal colonization.    GLUCOSE, POC    Collection Time: 01/08/20  8:49 AM   Result Value Ref Range    Glucose (POC) 223 (H) 65 - 100 mg/dL   EKG, 12 LEAD, INITIAL    Collection Time: 01/08/20  9:57 AM   Result Value Ref Range    Ventricular Rate 75 BPM    Atrial Rate 75 BPM    P-R Interval 170 ms    QRS Duration 94 ms    Q-T Interval 384 ms    QTC Calculation (Bezet) 428 ms    Calculated P Axis 53 degrees    Calculated R Axis 65 degrees    Calculated T Axis -137 degrees    Diagnosis       Normal sinus rhythm  T wave abnormality, consider inferior ischemia  Abnormal ECG  When compared with ECG of 23-DEC-2019 11:29,  Incomplete right bundle branch block is no longer Present  Confirmed by Sis Lpoez ([id number]) on 1/8/2020 10:22:43 AM

## 2020-01-08 NOTE — PERIOP NOTES
Patient confirms name and . Order to obtain consent  found in EHR and  matches case posting. Type 3 surgery,  assessment complete. Labs per surgeon: type and cross for RBC, platelets, FFP, cbc with diff, cmp, HGB A1C, pt/inr, ptt, urinalysis, urine culture, magnesium 20  Labs per anesthesia protocol: poc glucose 20  EK20  Glucose:223    Patient sent to radiology for chest x-ray and carotid ultrasound 20. Medication list Rx bottles visualized today. Hibiclens and instructions given per hospital policy. Patient provided with and instructed on educational handouts including Guide to Surgery, Pain Management, Hand Hygiene, Blood Transfusion Education, and [facility] Brochure. Patient answered medical/surgical history questions at their best of ability. All prior to admission medications documented in [facility]. Patient instructed to continue previous medications as prescribed prior to surgery and to take the following medications the day of surgery according to anesthesia guidelines with a small sip of water: allopurinol if needed, asa 81 mg, buspar if needed, tamsulosin. Instructed patient to use mupirocin ointment the night before and morning of surgery. Also instructed patient to bring albuterol inhaler and bipap dos. Patient to take 60 units of lantus insulin dos. Patient to take amiodarone 600 mg and metoprolol 12.5 mg after 4 pm the night before surgery. Patient instructed to hold all vitamins 7 days prior to surgery and NSAIDS 5 days prior to surgery, patient verbalized understanding. Medications to be held: ibuprofen    Patient instructed on the following:  Arrive at [facility], time of arrival to be called the day before by 1700  NPO after midnight including gum, mints, and ice chips. Responsible adult must drive patient to the hospital, stay during surgery, and patient will require supervision 24 hours after anesthesia.   Use hibiclens in shower the night before surgery and on the morning of surgery. Leave all valuables (money and jewelry) at home but bring insurance card and ID on       DOS. Do not wear make-up, nail polish, lotions, cologne, perfumes, powders, or oil on skin. Patient teach back successful and patient demonstrates knowledge of instruction.

## 2020-01-08 NOTE — ANESTHESIA PREPROCEDURE EVALUATION
Relevant Problems   No relevant active problems       Anesthetic History               Review of Systems / Medical History      Pulmonary        Sleep apnea: BiPAP           Neuro/Psych              Cardiovascular      Valvular problems/murmurs: aortic stenosis        CAD      Comments: Multivessel CAD; valve area 0.82; mild to moderate MR   GI/Hepatic/Renal         Renal disease: CRI       Endo/Other    Diabetes: poorly controlled, type 2, using insulin  Hypothyroidism: well controlled  Morbid obesity and arthritis     Other Findings            Physical Exam    Airway  Mallampati: III  TM Distance: 4 - 6 cm  Neck ROM: normal range of motion   Mouth opening: Normal     Cardiovascular    Rhythm: regular           Dental    Dentition: Caps/crowns and Bridges     Pulmonary                 Abdominal  GI exam deferred       Other Findings            Anesthetic Plan    ASA: 4      Monitoring Plan: Arterial line, BIS, CVP, [place]ly and TERRI      Induction: Intravenous  Anesthetic plan and risks discussed with: Patient

## 2020-01-08 NOTE — PERIOP NOTES
Abnormal labs routed to surgeon including HGB 10.9, Hct 33.4, Hgb A1C 7.6, glucose 230, bun 29, creatinine 1.9. Also mentioned carotid ultrasound result. Left message for Elfego Phillips NP regarding results.

## 2020-01-09 NOTE — PROCEDURES
[address] [facility] NOTE    Name:  Uday Davis  MR#:  [id number]  :  1945  ACCOUNT #:  [de-identified]  DATE OF SERVICE:  2020    SPIROMETRY RESULTS    RESULTS:  FVC 2.43 liters (57% predicted). FEV1 1.82 liters (64% predicted). FEV1/FVC 0.75. INTERPRETATION:  Spirometry reveals a moderate restrictive pattern, but no airflow obstruction. Consider complete pulmonary function tests if further characterization of the restrictive defect is needed.         Susanne Birch MD      [email or web address]  D:  2020 17:33  T:  2020 23:54  JOB #:  [id number]

## 2020-01-10 PROBLEM — I25.10 CAD (CORONARY ARTERY DISEASE): Status: ACTIVE | Noted: 2020-01-01

## 2020-01-10 PROBLEM — I35.0 AORTIC VALVE STENOSIS: Status: ACTIVE | Noted: 2020-01-01

## 2020-01-10 PROBLEM — Z95.2 S/P AVR: Status: ACTIVE | Noted: 2020-01-01

## 2020-01-10 PROBLEM — Z95.1 S/P CABG X 3: Status: ACTIVE | Noted: 2020-01-01

## 2020-01-10 PROBLEM — Z99.11 WEANING FROM RESPIRATOR (HCC): Status: ACTIVE | Noted: 2020-01-01

## 2020-01-10 PROBLEM — R09.02 HYPOXEMIA: Status: ACTIVE | Noted: 2020-01-01

## 2020-01-10 NOTE — PROGRESS NOTES
Pre-op prayer request received. Prayer and support given to patient and two family members. Rev.  EDMOND Singh

## 2020-01-10 NOTE — PROGRESS NOTES
01/10/20 1622   Oxygen Therapy   O2 Sat (%) 98 %   O2 Device Endotracheal tube;Ventilator   FIO2 (%) 80 %  (decreased after ABG)     Results for Maribel Mandel (MRN [id number]) as of 1/10/2020 16:44   Ref. Range 1/10/2020 16:19   Exhaled minute volume Latest Units: L/min 8.90   pH (POC) Latest Ref Range: 7.35 - 7.45   7.263 (L)   pCO2 (POC) Latest Ref Range: 35 - 45 MMHG 45.9 (H)   pO2 (POC) Latest Ref Range: 75 - 100 MMHG 128 (H)   HCO3 (POC) Latest Ref Range: 22 - 26 MMOL/L 20.8 (L)   sO2 (POC) Latest Ref Range: 95 - 98 % 98   Base deficit (POC) Latest Units: mmol/L 6   FIO2 (POC) Latest Units: % 100   Patient temp.  Latest Units:   98.6   Specimen type (POC) Latest Units:   ARTERIAL   Set Rate Latest Units: bpm 18   Site Latest Units:   DRAWN FROM ARTERIAL LINE   Device: Latest Units:   VENT   Mode Latest Units:   SIMV   Tidal volume Latest Units: ml 500   PEEP/CPAP (POC) Latest Units: cmH2O 12   Allens test (POC) Latest Units:   NOT APPLICABLE

## 2020-01-10 NOTE — OP NOTES
[facility]  OPERATIVE REPORT    Name:  Haresh Baker  MR#:  [id number]  :  1945  ACCOUNT #:  [de-identified]  DATE OF SERVICE:  01/10/2020    PREOPERATIVE DIAGNOSES:  1. Severe aortic stenosis. 2.  Severe multivessel atherosclerotic coronary artery disease. POSTOPERATIVE DIAGNOSES:  1. Severe aortic stenosis. 2.  Severe multivessel atherosclerotic coronary artery disease. PROCEDURES PERFORMED:  1. Aortic valve replacement with a 25-mm Intuity Jon-Gutierres valve. 2.  Coronary artery bypass grafting x3. The grafts consisting:  a. LIMA to the LAD. b.  Reverse saphenous vein graft to the OM. c.  Reverse saphenous vein graft to the PDA. SURGEON:  Kurt Hernandez. Jose J Fragoso MD    ASSISTANT:  Marlyn Ho MD    ANESTHESIA:  General endotracheal with TERRI. COMPLICATIONS:  None. SPECIMENS REMOVED:  .    IMPLANTS:  .    ESTIMATED BLOOD LOSS:  Minimal.    OPERATIVE FINDINGS:  Saphenous vein 3-4 mm, LIMA 3 mm. Aorta was soft, no palpable plaque. Aortic valve was trileaflet with heavy leaflet calcifications and annulus relatively spared. LAD 1.5 mm, severe distal disease. OM is 1.5 mm, severe distal disease. PDA is 1.5 mm, severe distal disease. INDICATIONS:  The patient is a very pleasant 78-year-old gentleman who was followed by Dr. Vida Bradford, who presents with worsening exertional dyspnea and chest pressure. Most recent echo showed an JUAN MIGUEL of 0.82 cm2 with a 40 mm mean gradient and a 61 mm peak gradient. TAVR was discussed with him; however, left heart cath showed severe multivessel disease with relatively preserved LV function. DESCRIPTION:  After informed consent was obtained, the patient was brought to the operating suite and placed in supine position. General endotracheal anesthesia was induced without difficulty. Appropriate monitoring lines were placed by Anesthesia as well as transesophageal echo. He was prepped and draped in the usual sterile fashion. First two segments of greater saphenous vein were taken from his left leg. These incisions were closed in a double-layered fashion with 3-0 and 4-0 Vicryl. Next, a standard median sternotomy incision was then made. Sternum was carefully divided. The left internal mammary artery was taken down in pedicle fashion and injected with papaverine. Systemic heparinization was given. The left pleural tube was placed through a separate stab and affixed to the skin. The pericardium was then opened and tacked into a pericardial well. The aorta palpated and noted to be soft. Pursestrings were placed into the aorta, right atrial appendage, right atrium, and right pulmonary vein. After adequate ACT was obtained, he was cannulated through these pursestrings. Cardiopulmonary bypass was good and instituted without difficulty. A retrograde cannula was placed in the coronary sinus. Target vessels were marked. Aorta was crossclamped. He received both antegrade and retrograde cold blood cardioplegia solution. He did receive intermittent doses of cold blood cardioplegia throughout the remainder of the case. The left ventricular vent was placed into the pulmonary vein. Heart was then positioned for grafting of the PDA. This was a 1.5-mm vessel. Using a running 7-0 Prolene, end-to-side anastomosis was completed with reverse saphenous vein. This was measured back to the aorta and divided. Next, the heart was positioned for grafting of the OM. This was a 1.5-mm vessel and again using a running 7-0 Prolene, an end-to-side anastomosis was completed. Next, the heart was positioned for the grafting of the LAD. This was a 1.5-mm vessel and using a running 8-0 Prolene, an end-to-side anastomosis was completed with the left internal mammary artery. The pedicle was tied to the epicardium with 6-0 Prolenes. Next, the aorta was opened in a hockey-stick fashion revealing a very heavily calcified three-leaflet aortic valve. The leaflets were meticulously excised. The annulus was relatively spared of calcium, but this underwent minor debridement. The left ventricle was copiously irrigated with iced saline and measured to a 25-mm Intuity. At this point, three sutures were placed at the noreen of the cusp. The valve measured to 25 mm and brought up into the field. Sutures were passed through a sewing ring. The valve seated down without difficulty and marked in place with sutures. Next, the balloon and stent were inflated to 5 atmospheres for 10 seconds and the valve mccoy was removed without difficulty. The valve appeared to be in good shape and in good position. At this point, the aortotomy was closed in double-layered fashion with pledgeted 4-0 Prolene. Punch aortotomy was then created in the aorta and both reverse saphenous vein grafts of proximal anastomosis were completed with running 6-0 Prolene. At this point, he was placed in a steep Trendelenburg position and given a hot shot dose of cardioplegia, warmed to 37 degree centigrade. After completion of the cardioplegia, heart was filled with blood. Meticulous deairing was then undertaken. This was then checked by transesophageal echo and noted to be adequate. Cross-clamp was removed. We had an initial slow sinus rate of about 45. Ventricular pacing wires were placed upon the heart, brought out through stab wounds inferiorly and affixed to the skin. He was VVI paced at 80s at the remainder of the case. He was weaned from cardiopulmonary bypass without difficulty and decannulated. All pursestrings were tied and noted to be hemostatic. Protamine sulfate was given. Meticulous hemostasis was achieved. The pericardium was  copiously irrigated with warm saline. A single 32-Upper sorbian mediastinal tube was placed through a separate stab inferiorly and affixed to the skin.   Sternum was reapproximated with stainless steel wire, fascia was closed with #1 PDS, subcutaneous with 3-0 Vicryl, and the skin was closed with 4-0 Vicryl subcuticular. All instrument and sponge count was correct at the end of the case.       Colby Delgado MD      [id number]  D:  01/10/2020 13:57  T:  01/10/2020 16:26  JOB #:  [id number]

## 2020-01-10 NOTE — ANESTHESIA PROCEDURE NOTES
TERRI  Date/Time: 1/10/2020 8:20 AM  Ordering Provider: Aicha Restrepo MD    Procedure Details: probe placement, image aquisition & interpretation    Timeout performed, 08:20  Risks and benefits discussed with the patient and plans are to proceed    Procedure Note    Performed by: Tamra Saunders MD  Authorized by: Tamra Saunders MD       Indications: assessment of ascending aorta  Modalities: 2D, CF, CWD, PWD  Probe Type: biplane  Insertion: atraumatic  Patient Status: intubated and sedated    Echocardiographic and Doppler Measurements   Aorta  Size  Diam(cm)  Dissection PlaqueThick(mm)  Plaque Mobile    Ascending normal 2.3       Arch normal        Descending normal 2.3  0-3           Valves  Annulus  Stenosis  Area/Grad  Regurg  Leaflet   Morph  Leaflet   Motion    Aortic normal moderate, severe 0.9  calcified restricted    Mitral normal   2+ normal     Tricuspid normal none  0            Atria  Size  SEC (smoke)  Thrombus  Tumor  Device    Rt Atrium normal No No  Yes    Lt Atrium normal No No  No         Interventricular Septum Morphology: normal    Ventricle  Cavity Size  Cavity Dimension Hypertrophy  Thrombus  Gloal FXN  EF    RV normal    normal     LV normal  Yes  mildly impaired 45-50%       Regional Function  (1 = normal, 2 = mildly hypokinetic, 3 = severely hypokinetic, 4 = akinetic, 5 = dyskinetic) LAV - Long [place] View   ME LAV = 0  ME LAV = 90  ME LAV = 130                                     Pericardium: normal    Post Intervention Follow-up Study  Ventricular Global Function: unchanged  Ventricular Regional Function: unchanged     Valve  Function  Regurgitation  Area    Aortic improved 0     Mitral improved 1+     Tricuspid no change      Prosthetic normal       Complications: None

## 2020-01-10 NOTE — ANESTHESIA POSTPROCEDURE EVALUATION
Procedure(s):  CORONARY ARTERY BYPASS GRAFT WITH AVR/ (CABG X 3 )/ LIMA  VEIN HARVEST/ GREATER SAPHENOUS  ESOPHAGEAL TRANS ECHOCARDIOGRAM.    general    Anesthesia Post Evaluation      Multimodal analgesia: multimodal analgesia used between 6 hours prior to anesthesia start to PACU discharge  Patient location during evaluation: ICU  Patient participation: complete - patient cannot participate  Post-procedure mental status: Sedated. Pain management: adequate  Airway patency: patent  Anesthetic complications: no  Cardiovascular status: acceptable (On vasopressor support)  Respiratory status: ETT and ventilator  Hydration status: acceptable  Post anesthesia nausea and vomiting:  none      Vitals Value Taken Time   BP     Temp     Pulse 79 1/10/2020  3:01 PM   Resp 29 1/10/2020  3:01 PM   SpO2 100 % 1/10/2020  3:01 PM   Vitals shown include unvalidated device data.

## 2020-01-10 NOTE — PROGRESS NOTES
Patient out from operating room and placed on the ventilator on documented settings. Patient is orally intubated with a # 8.0 ET Tube secured at the 23 cm casey at the lip. Breath sounds are diminished. Trachea is midline. Negative for subcutaneous air, chest excursion is symmetric. Negative for pitting edema. Patient is also Negative for cyanosis. Patient has a Left Radial arterial line. Patient has 2 Chest tubes. All alarms are set and audible. Resuscitation bag is at the head of the bed. Ventilator Settings  Mode FIO2 Rate Tidal Volume Pressure PEEP I:E Ratio   SIMV, PRVC, Pressure support  100 %   14 500 ml  10 cm H2O  8 cm H20  1:3.33      Peak airway pressure: 24.6 cm H2O   Minute ventilation: 6.7 l/min     ABG: No results for input(s): PH, PCO2, PO2, HCO3 in the last 72 hours.       Shyam Gaitan, RT

## 2020-01-10 NOTE — BRIEF OP NOTE
BRIEF OPERATIVE NOTE    Date of Procedure: 1/10/2020   Preoperative Diagnosis: Atherosclerosis of native coronary artery of native heart with unstable angina pectoris ([facility] [address] [I25.110]  Nonrheumatic aortic valve stenosis [I35.0]  Postoperative Diagnosis: Atherosclerosis of native coronary artery of native heart with unstable angina pectoris ([address] [I25.110]  Nonrheumatic aortic valve stenosis [I35.0]    Procedure(s):  CORONARY ARTERY BYPASS GRAFT WITH AVR/ (CABG X 3 )/ LIMA  VEIN HARVEST/ GREATER SAPHENOUS  ESOPHAGEAL TRANS ECHOCARDIOGRAM  Surgeon(s) and Role:     * Geovanni Fraser MD - Primary     * Cece Monteiro MD - Assisting         Surgical Assistant:     Surgical Staff:  Circ-1: German Medina RN  Circ-Relief: Tony Sinha RN  Perfusionist: Ynes Underwood  Scrub Tech-1: Carmine Paulson  Scrub Tech-2: Ermias Gutierrez  Event Time In Time Out   Incision Start 8100    Incision Close 1325      Anesthesia: General   Estimated Blood Loss: minimal  Specimens:   ID Type Source Tests Collected by Time Destination   1 : aortic valve Preservative Aortic Valve  Geovanni Fraser MD 1/10/2020 1141 Pathology      Findings: as, cad   Complications: none  Implants:   Implant Name Type Inv.  Item Serial No.  Lot No. LRB No. Used Action   VALVE AORT KT W/SYR-HDL 25MM -- [employer] - [id number]  VALVE AORT KT W/SYR-HDL 25MM -- [facility] [id number] [employer]   1 Implanted

## 2020-01-10 NOTE — PROCEDURES
[address] [facility]  PROCEDURE NOTE    Name:  Merly Posada  MR#:  [id number]  :  1945  ACCOUNT #:  [de-identified]  DATE OF SERVICE:  2020    SPIROMETRY RESULTS    RESULTS:  FVC 2.43 liters (57% predicted). FEV1 1.82 liters (64% predicted). FEV1/FVC 0.75. INTERPRETATION:  Spirometry reveals a moderate restrictive pattern, but no airflow obstruction. Consider complete pulmonary function tests if further characterization of the restrictive defect is needed.         Ruchi Martinez MD      [id number]  D:  2020 17:33  T:  2020 23:54  JOB #:  [id number]

## 2020-01-10 NOTE — ROUTINE PROCESS
Respiratory Care Services       Policy Number: [id number]    Title: Patient Care Assessment Protocol    Effective Date: 01/1999    Revised Date: 05/2014, 04/2018, 12/2018, 07/2019    Reviewed Date: 06/2013/ 03/2015, 03/2016, 06/2017        Overview  In an effort to improve quality and reduce costs of respiratory care at [address] [place] [address], the Respiratory Department has developed a number of Patient Care Protocols. These protocols have been developed according to Mirna 3 and are utilized for those patients who are ordered respiratory therapy using therapeutic indications and standardized approaches for accomplishing objectives. Patient Care Protocols are intended to improve care by:   Defining the indications and standards of care agreed upon by the Pulmonary Medicine and [facility] [address] of [address] [place] [address].  Training respiratory care practitioners to apply those criteria to individual patients and modify therapy as indicated by the protocols.  Documenting the indication and care plan as part of the initial ordering process.  Tapering or discontinuing treatments once the indication for therapy changes. The Patient Care Protocols shall be universally applied throughout the hospital as determined by   the Pulmonary Medicine and [facility] [address] Rationale for Patient Care Assessment Protocols:   Continuous Quality Improvement   Cost containment   Standardization of care   Enhanced continuity of care   Utilization review   Timely intervention    The following patient care assessment protocols have been developed:   Aerosolized Medication Protocol http://laurab/[facility]/bennie/bill/policies/Respiratory%20Care%20Services%20Policies/-[id number]. doc    Bronchial Hygiene Protocol http://grupob/[facility]/stfrancis/policies/Respiratory%20Care%20Services%20Policies/-[id number]. doc   Oxygen Protocolhttp://grupob/local[employer]ruikstems/[facility]/stfrancis/policies/Respiratory Care Services Policies/-[id number]. doc http://spb/[facility]/stfrancis/policies/Respiratory%20Care%20Services%20Policies/-[id number]. doc   CVRU Fast Track Weaning Protocol http://splaurab/localAltruikstems/[facility]/stfrancis/policies/Respiratory%20Care%20Services%20Policies/-[id number]. doc    Asthma Treatment Protocol ERhttp://luis fernando/local[employer]ruikstems/gv/stfrancis/policies/Respiratory Care Services Policies/-[id number]. doc http://grupodee dee/[facility]/stfrancis/policies/Respiratory%20Care%20Services%20Policies/-[id number]. doc   Pediatric Asthma Treatment Protocol ERhttp://luis fernando/[facility] Healthcarepi[facility]/stfrancis/policies/Respiratory Care Services Policies/-[id number]. doc http://grupodee dee/[facility]/stfrancis/policies/Respiratory%20Care%20Services%20Policies/-[id number]. doc   Alpha-1 Antitrypsin Deficiency Protocolhttp://grupodee dee/localpiAL[employer]/gvl/stfrancis/policies/Respiratory Care Services Policies/-[id number]. doc http://grupob/local[facility]ikstems/[facility]/stfrancis/policies/Respiratory%20Care%20Services%20Policies/-[id number]. doc   Prone Positioning Protocol http://grupob/local[facility]ikstems/[facility]/stfrancis/policies/Respiratory%20Care%20Services%20Policies/-[id number]. doc   COPD Protocol http://[facility]/stancis/policies/Respiratory%20Care%20Services%20Policies/-[id number]. doc   Home Oxygen Assessment Protocolhttp://grupodee dee/[facility]/stfrancis/policies/Respiratory Care Services Policies/-[id number]. doc http://[facility]/stfrancis/policies/Respiratory%20Care%20Services%20Policies/-[id number]. doc   Ventilator Weaning Protocol http://[facility]upoSUN[facility]/stfrancis/policies/Respiratory%20Care%20Services%20Policies/-[id number]. doc   Lung Volume Expansion Protocolhttp://yannb/localsystems/angelinal/stfrancis/policies/Respiratory Care Services Policies/-[id number]. doc http://spweb/[employer]alsystems/gvl/stfrancis/policies/Respiratory%20Care%20Services%20Policies/-[id number]. docm    The Director of [address] [place] [address] oversees the Patient Care Assessment Program. The Respiratory Educator is responsible for protocol development and training. The Supervisor is responsible for implementation and  activities. Each patient with an order for respiratory treatments will receive an evaluation. Respiratory Care Practitioners (RCP's) will perform the evaluations. The same evaluation tool will be utilized for initial and follow-up assessments. If the patient does not meet criteria for ordered therapy, the therapy will be discontinued. If the patient demonstrates an adverse response to initially ordered therapy, the therapy will be discontinued and the physician will be contacted. Specific physician's orders that deviate from protocols and are deemed \"inappropriate\" or \"unsafe\" will be addressed with ordering physician and/or medical director as required. Respiratory Patient Care Assessment Protocols    I. Policy: In an effort to provide quality patient care and effective utilization of services, physicians who order respiratory therapy will have their patients treated via the protocols established (see attached) Respiratory Care Practitioners (RCP's) will complete the initial assessment which will indicate patient needs,  the care plan developed and will performed within 24 hours of admission. Frequency of the therapy will be set according to the results of the respiratory therapy evaluation and frequency guidelines policy. Reassessment will be continued every 48 hours and more frequently as needed for the individual patient. II. Purpose:     A.  To provide a process that will allow for ongoing assessment and care plan modification for patients receiving respiratory services based on both objective and or subjective patient responses to interventions. This process of protocol utilization will assist in patient care progression while eliminating the need for the physician to continually update respiratory therapy orders. B. To assure continuity of respiratory care that meets [facility] Clinical Practice Guidelines. III. Initiation:  Implement Respiratory Care Protocols for patients who are ordered by physician          to receive respiratory therapy procedures or for ventilator management. IV. Protocol:  A. Upon receiving an order for therapy the RCP will review the patient's EMR (electronic medical record) for all pertinent information includin. Physician's order for therapy  2. Patient history and physical examination  3. Physician progress notes  4. Diagnostic. X-rays, PFT's, arterial blood gases etc.  B. The RCP will perform a respiratory assessment in the following manner:  1. General observations: color, pattern and effort of breathing, chest expansion, (symmetrical and bilateral), level of consciousness and the ability to ambulate. 2. The RCP will assess patient's cough ability and determine if bronchial hygiene is needed. If patient is unable to produce sputum, at that time, the RCP should question the patient with regard to their sputum: production, color consistency, frequency and amount. 3. Auscultation: Using a stethoscope, the RCP will listen and note quality of breath sounds and presence or absence of adventitious breath sounds in all lung fields, both anteriorly and posteriorly. 4. Upon completing the EMR review and physical assessment, the RCP will document findings in the RT Assessment section of the EMR. The score level will be provided and will be used to determine the frequency of therapy. V. Indications:   A. Bronchial Hygiene Protocol indications:   1. Potential for or presence of atelectasis.    1. Need for hydration and removal of retained secretions. 1. Need for improvement of cough effectiveness. 1. Presence of conditions associated with disorder of pulmonary clearance:  a. Cystic fibrosis  b. Bronchiectasis  c. Neuromuscular disease  d. Obstructive lung diseases  e. Restrictive lung diseases   Aerosolized Medication(s) Protocol indications:Treatment of bronchospasm/wheezing  2. Improvement of mucociliary clearance  3. Treatment of stridor  4. History of Bronchiectasis, Asthma or COPD  C. Oxygen Therapy Protocol indications:   1. Documented hypoxemia  2. Severe trauma  3. Acute myocardial infarction  4. Short-term therapy (e.g. post anesthesia recovery)  D. [facility] Ventilator Weaning Protocol indications:  1. All mechanically ventilated surgical patients unless they have a no wean order. E. Asthma Treatment Protocol ER indications:  1. Patients 15years of age and older that have been triaged or diagnosed with   asthma exacerbation shall be indicated for the ER Asthma Treatment Protocol. A physician order will be required to initiate the protocol. F. Pediatric Asthma protocol in the ER indications:  1. Patients less than 15years old that have been triaged or diagnosed with asthma exacerbation shall be indicated for the Pediatric Asthma protocol. A physician order will be required to initiate the protocol. G. Alpha-1 Antitrypsin Deficiency Testing protocol indications:         1. Patients admitted and diagnosed with COPD. H. Prone Positioning Protocol indications:         1. Acute lung injury         2. Acute respiratory distress syndrome (ARDS)   I. Respiratory Care COPD Protocol indications:         1. History of COPD in patient's records         2. Smoking history   J. Home Oxygen Assessment Protocol indications:         1. Chronic lung disease         2. Cor pulmonale         3. Unable to wean to room air 48 hours prior to anticipated discharge.    K.  Ventilator Weaning Protocol indications: 1. Patient's mechanically ventilated          2. Managed by intensivist  ALICE Lung Volume Expansion Protocol indications:        1. Any patient at risk for pulmonary complications. VI. Maintenance:    A. Timely patient assessment is an integral part of this protocol therefore the following will be applied:  1. All non- critical care patients will be evaluated upon receiving initial respiratory care orders within 24 hours and re-evaluated within 48 hours (or more as needed). 2. Orders requesting a Respiratory Consult will be responded to in the following manner:  a. In patient emergency situations, the RCP assigned to the floor will respond immediately to the patient, provide an initial respiratory assessment, and contact the patient's physician as necessary for appropriate orders. b. In non-emergent situations, the RCP assigned to the floor will respond to the patient within 90 minutes and provide an initial respiratory assessment and contact patient's physician as necessary for appropriate orders. c. An RCP will provide a comprehensive assessment as soon as possible. 3. Upon completion of an evaluation, the RCP will complete documentation in the patient's EMR in the RT Assessment section. 4. The RCP who completes the assessment will document orders for therapy in the orders section of the patient's EMR selecting new order. Next, per protocol should be selected indicating it is a protocol order and sign orders should be selected to complete the process. The applicable protocol must be added to the progress note per Joint Commission guidelines. 5. The Pharmacy and Therapeutics (P&T) Committee has mandated that the medication Xopenex may be changed to unit dose albuterol without an order, except for those patients receiving Xopenex due to cardiac arrhythmias.    1. The dosage for these patients should be 0.63 mg. and may be changed from 1.25 mg. to 0.63 mg per P & T Committee by the RCP completing the assessment. 6. Patients who are not experiencing cardiac arrhythmias, and are ordered Xopenex and Atrovent may be changed to Duoneb. VII. Safety:        A. The following safety issues shall be monitored:  1. The RCP will perform hand hygiene per hospital policy utilizing Standard Precautions for all patients and following transmission-based isolation as indicated per hospital policy. 2. The RCP must exercise professional judgment in classifying the patient for frequency of therapy. 3. Appropriate classification of the patient will require an evaluation utilizing the Therapy Assessment Protocol Guidelines. 4. The RCP will confer with the physician concerning the care of the patient at any time questions or problems arise. 5. If during therapy, the patient exhibits no improvement, or deterioration in clinical status the RCP will notify the physician and the patient's nurse. VIII. Interventions:   A. The patient's nurse is responsible concerning all items related to his/her care. Ongoing communication with nursing is essential to successful protocol management. B. The RCP recognizes the value of the team approach in meeting the patient's needs. Nursing input regarding the patient's pulmonary condition will be sought as needed. IX. Reportable conditions: The RCP will inform the physician if:  1. There are acute changes in patient's respiratory status. 2. The therapist is unable to determine appropriate care plan upon assessment. 3. The patient fails to reach therapeutic objective. 4. A change or additional medication is needed. X.  Patient Education:    A. Patient will receive instruction on the followin. The treatment modality, including objectives and proper technique of therapy  2. Respiratory medications  B. Documentation shall occur in the patient education section of the patient's EMR. XI. Documentation: Record all findings as described above in the patient's EMR.     Related Protocols: A. Aerosolized Medication Protocol  B. Bronchial Hygiene   C. Oxygen Protocol   D. [facility] Fast Track Weaning Protocol  E. Asthma Treatment protocol ER  F. Alpha-1 antitrypsin Deficiency Protocol  G. Prone Positioning Protocol  H. Respiratory Care COPD Protocol  I. Home Oxygen assessment Protocol  J. Ventilator Weaning Protocols   K. Volume Expansion protocol    Indications      Frequency          Level  A. Aerosol therapy   1.  q4h     Severe SOB, wheezing, unable to sleep 1   2.  qid, q4 wa or q6h   Moderate SOB, wheezing   2   3.  tid      Hx of asthma, or COPD mild wheezing,         or facilitate secretion removal              3   4.  bid      Asthma, or COPD, Intermittent wheezing 4   5. PRN, i.e. tid PRN, qid PRN Asthma, or COPD, occasional wheezing 5       B. Bronchopulmonary Hygiene    1. q4h             Copious secretions, SOB, unable to sleep 1   2. qid & PRN            Moderate amounts of secretions   2   3. tid           Small amounts of secretions and poor cough,               history of secretions    3    4. PRN, i.e. tid PRN, qid PRN     Breathing exercises, encourage cough only 4      C. Oxygen Therapy     Follow hospital approved Oxygen Protocol      Note:  qid treatments are due 0800, 1200, 1600, and 2000. tid treatments are due 0800, 1400, and 2000  Q6h treatments are due 0800, 1400, 2000, 0200  Q4 wa teatments are due 0800, 1200, 1600, and 2000. Q4h treatments are due  0800, 1200, 1600, 2000, 0000, and 0400. The Level 1-5 rating system is only to be used as criteria for determining appropriate frequency of therapy. References:   N   Joint Commission Consolidated Fabian Standard   L    Respiratory Care Department Policy, Procedure and Protocol Guideline Manual, 1995, BERT Chen. L  Therapist Driven Respiratory Care Protocols  A Practitioner's Guide for Criteria-Based Respiratory Care by Germania Zamorano M.D., and BERT Dacosta, RRT.    L  The rationale for therapist-driven protocols: an update. Respiratory Care 1998; 43:936-090   L Therapist Driven Respiratory Care Protocols  A Practitioner's Guide for Criteria-Based Respiratory Care by Graeme Lamb M.D., and LEVAR Díaz The rationale for therapist-driven protocols: an update. Respiratory Care 1998; [id number]. N   [facility] Clinical Practice Guidelines. A. The RCP will perform a respiratory assessment in the following manner:  1. Perform hand hygiene per hospital policy utilizing Standard Precautions for all patients and following transmission-based isolation as indicated per policy. 2. Identify patient via ID bracelet verifying patient name and birth date. 3. General observations: color, pattern and effort of breathing, chest expansion, (symmetrical and bilateral), level of consciousness and the ability to ambulate. 4. The RCP will assess patients cough ability and determine if Nasotracheal suctioning is needed. If patient is unable to produce sputum, at that time, the RCP should question the patient with regard to their sputum: production, color consistency, frequency and amount. 5. Auscultation: Using a stethoscope, the RCP will listen and note quality of breath sounds and presence or absence of adventitious breath sounds in all lung fields, both anteriorly and posteriorly. 6. Upon completing the EMR review and physical assessment, the RCP will document findings in the RT Assessment section of the EMR. The score level will be provided and will be used to determine the frequency of therapy. V. Indications:   A. Indications for Bronchial Hygiene Protocol will include:  1. Potential for or presence of atelectasis. 2. Need for hydration and removal of retained secretions. 3. Need for improvement of cough effectiveness. 4. Presence of conditions associated with disorder of pulmonary clearance:  a. Cystic fibrosis  b. Bronchiectasis  B.   Indications for Aerosolized Medication(s) Protocol should include:  1. Treatment of bronchospasm/wheezing  2. Improvement of mucociliary clearance  3. Treatment of stridor  4. History of Asthma or COPD             C.  Indications for Oxygen Therapy Protocol should include:  1. Documented hypoxemia  2. Severe trauma  3. Acute myocardial infarction  4. Short-term therapy (e.g. post anesthesia recovery)    VI. Maintenance:    A. Timely patient assessment is an integral part of this protocol therefore the following will be applied:  1. All non- critical care patients will be evaluated upon receiving initial respiratory care orders within 24 hours and re-evaluated within 48 hours (or more as needed). 2. Orders requesting a Respiratory Consult will be responded to in the following manner:  a. In patient emergency situations, the RCP assigned to the floor will respond immediately to the patient, provide an initial respiratory assessment, and contact the patients physician as necessary for appropriate orders. b. In non-emergent situations, the RCP assigned to the floor will respond to the patient within 90 minutes and provide an initial respiratory assessment and contact patients physician as necessary for appropriate orders. c. An RCP will provide a comprehensive assessment as soon as possible. 3. Upon completion of an evaluation, the RCP will complete documentation in the patients EMR in the RT Assessment section. 4. The RCP who completes the assessment will document orders for therapy in the orders section of the patients EMR selecting new order. Next, per protocol should be selected indicating it is a protocol order and sign orders should be selected to complete the process. 5. The Pharmacy and Therapeutics (P&T) Committee has mandated that the medication Xopenex may be changed to unit dose albuterol without an order, except for those patients receiving Xopenex due to cardiac arrhythmias.  The dosage for these patients should be 0.63 mg. and may be changed from 1.25 mg. to 0.63 mg per P & T Committee by the RCP completing the assessment. 6. Patients who are not experiencing cardiac arrhythmias, and are ordered Xopenex and Atrovent may be changed to Duoneb. VII. Safety:        A. The following safety issues shall be monitored:  1. The RCP will perform hand hygiene per hospital policy utilizing Standard Precautions for all patients and following transmission-based isolation as indicated per hospital policy. 2. The RCP must exercise professional judgment in classifying the patient for frequency of therapy. 3. Appropriate classification of the patient will require an evaluation utilizing the Therapy Assessment Protocol Guidelines. 4. The RCP will confer with the physician concerning the care of the patient at any time questions or problems arise. 5. If during therapy, the patient exhibits no improvement or deterioration in clinical status the RCP will notify the physician and the patients nurse. VIII. Interventions:   A. The patients nurse is responsible concerning all items related to his/her care. Ongoing communication with nursing is essential to successful protocol management. B. The RCP recognizes the value of the team approach in meeting the patients needs. Nursing input regarding the patients pulmonary condition will be sought as needed. IX. Reportable conditions: The RCP will inform the physician if:  1. There are acute changes in patients respiratory status. 2. The therapist is unable to determine appropriate care plan upon assessment. 3. The patient fails to reach therapeutic objective. 4. A change or additional medication is needed. X.  Patient Education:    A. Patient will receive instruction on the followin. The treatment modality, including objectives and proper technique of therapy  2. Respiratory medications  B. Documentation shall occur in the patient education section of the patients EMR. XI.  Documentation: Record all findings as described above in the patients EMR. Related Protocols: A. Aerosolized Medication Protocol  B. Bronchial Hygiene  C. Oxygen Protocol   D. Volume Expansion/Secretion Clearance  E. Ventilator Weaning Protocols    References:  N   Joint Commission Consolidated Fabian Standard   L    Respiratory Care Department Policy, Procedure and Protocol Guideline Manual, 1995, BERT PEDERSON  Therapist Driven Respiratory Care Protocols  A Practitioners Guide for Criteria-Based Respiratory Care by Radha Carranza M.D., and LEVAR Jackson  The rationale for therapist-driven protocols: an update. Respiratory Care 1998; [facility] Guidelines. Respiratory Care Services Policy Number: -[id number]    Title: Aerosolized Medication Protocol    Effective Date: 10/1998    Revised Date: 06/13, 03/16, 11/17, 07/19     Reviewed Date: 05/14/ 03/15 , 06/17, 5/18   I. Policy: The Aerosolized Medication Protocol shall by implemented by Respiratory Care Practitioners (RCP) for patients with orders to receive aerosol therapy with medication. II. Purpose: To open and maintain obstructed airways, the RCP, will utilize the following   protocol to select the indicated aerosolized medication(s) and determine the most effective method of delivery to the patient. III. Patient Type: All patients who are determined to meet aerosolized medication criteria as          outlined in this protocol. IV. Responsibility: Director, [address], registered Respiratory Care Practitioners (RCP's) with documented competency in the performance of respiratory therapeutic techniques. V. Equipment needed:  C. Stethoscope  D. Pulse oximeter  E. AeroEclipse nebulizer  F. Dry Powder Inhaler (DPI)     VI. Protocol:   C. The following conditions are accepted indications for aerosolized medication therapy. 5. Bronchospasm/wheezing  6.  Impaired mucociliary clearance  7. Tracheobronchial mucosal congestion/and laryngeal stridor  8. Diseases which commonly require aerosolized medication therapy include, but are not limited to:  f. Asthma/reactive airway disease  g. Bronchitis/emphysema (COPD)  h. Cystic fibrosis  i. Severe laryngitis/tracheitis  j. Bronchiectasis  k. Smoke inhalation or chemical trauma to the lung or upper airway  l. Physical trauma to the upper airway  m. Laryngotracheobronchitis  n. Bronchiolitis  o. Non-specific wheezing              B. Indications for bronchodilator medications will include:  d. Bronchospasm/ wheezing  e. Asthma/reactive airway disease  f. Chronic obstructive pulmonary disease  g. Obstructive defect on pulmonary function testing  C. Administration of medications  5. If a bronchodilator or any other type of respiratory medication is needed, a physician order must be indicated in the medication section in the patient's EMR. 6. When the physician specifies the medication and dosage at the time of request, the ordered medication will be used as part of the care plan. D. The following guidelines will be utilized in the evaluation and selection of the appropriate delivery device for indicated medication(s):  1. Unassisted aerosol (UA) is the preferred method of aerosol delivery and indicated if  c. Ventilation is inadequate  d. Patient demonstrates wheezing   e. Routine treatments shall be given via the AeroEclipse nebulizer. f. The Aerogen nebulizer shall be used in the following circumstances:  i. ER patients and they will continue with this nebulizer if admitted to 8th floor or ICU.  ii. Patients in ICU   iii. Patients on 8th floor with severe wheezing (at the RCP's discretion)  2. Dry PowderInhaler (DPI)   d. Patient should be alert/cooperative  e. Able to perform 3 second breath hold.   f. Patient has used DPI therapy previously, either at home or in the hospital.  g. Note: The only approved inhaler on formulary is Spiriva. VII. Guidelines:   Monitor patient's vital signs and evaluate patient's clinical status. The need to change medication and/or modality may be indicated by:  5. A pulse greater than 120 bpm, or if a pulse increase of 20 bpm occurs with bronchodilator medications. 6. Significant worsening of dyspnea or wheezing occurring during or within 30 minutes of discontinuing therapy. 7. Worsening of patient's sensorium (e.g. patient becomes confused or obtunded, and unable to follow directions). 8. Worsening of patient's chest x-ray. 9. Change in sputum (e.g. increased pulmonary infiltrate, which might indicate need for volume expansion therapy). 10. Patient has difficulty coughing up secretions, which might indicate need for acetylcysteine and/or bronchial hygiene therapy. 11. Call physician immediately if dyspnea worsens and is not responsive to modifications allowed by protocol. VIII. Clinical Responsibility:  2. The therapy assessment guidelines will be used to evaluate all patients receiving aerosolized medications with the exception of critical care areas. 5. RCP's will perform changes in therapy per protocol. 6. It will be the responsibility of RCP to provide instruction regarding respiratory medications, possible side effects, aerosol therapy and proper DPI technique, as well as, spacer usage to patients ordered DPI therapy. 7. Current therapy that is part of a patient's home regimen will not be discontinued. a. Provide spacer and educate patient on proper inhaler technique if needed. IX. Documentation  A. Document assessment findings in the respiratory assessment section of the patient's EMR. B. Document changes in therapy per protocol in the respiratory orders section and in the care plan section of the patient's EMR. C. Document patient education in the patient education section of the patient's EMR. X. Outcome Criteria:  B. Relief of wheezes and obstruction  C.  Improved cough and sputum color and consistency  D. Improved chest x-ray  E. Improved arterial oxygen tension and or SaO2  F. Improved Peak Flow on asthmatic patients        XI. Related Protocols:  B. Respiratory Patient Care Protocols  C. Bronchial Hygiene Therapy  D. Oxygen Protocol    Reference:  L - Respiratory Care Department Policy, Procedure and Protocol Guideline Manual, 1995, BERT Chen. L -  Therapist Driven Respiratory Care Protocols  A Practitioner's Guide for Criteria-Based Respiratory Care by Teofilo Kruse M.D., and BERT Goldstein RRT. L - The rationale for therapist-driven protocols: an update. Respiratory Care 1998; [id number]. N -[facility] Clinical Practice Guidelines. Respiratory Care Services     Policy Number: -[id number]    Title: Oxygen Protocol    Effective Date: 01/1996    Revised Date: 06/2013, 02/29/2016, 4/2018, 7/2019    Reviewed Date: 05/2014, 03/2015, 06/2017        I. Policy: The Oxygen Protocol will be initiated for all patients upon written order from physician for administration of oxygen therapy or if a patient is found to have an oxygen saturation of 88% or less. Special consideration: the goal of oxygen therapy for COPD patients is to maintain oxygen saturation between 88% - 92% to comply with GOLD Guidelines. II. Purpose: To provide protocol driven respiratory therapy for the administration of oxygen at concentrations greater than that in ambient air with the intent of treating or preventing the symptoms and manifestations of hypoxia. III. Responsibility: Director Respiratory Care Services, all Respiratory Care Practitioners     IV. Indications:   G. Implement this protocol for patients when physician orders oxygen to be administered or when patient is found to have an oxygen saturation of 88% or less. H.  To assure routine monitoring of patient's oxygen saturation b.i.d. and to make appropriate adjustments in accordance with ordered oxygen saturation parameters. I. To assure continuity of respiratory care that meets [facility] Clinical Practice Guidelines and GOLD Guidelines. J. Hb < 8  K. Sickle Cell anemia crisis    V. Assessment:  Assess the following parameters to determine the need to adjust oxygen:  D. Measurement of patient's oxygen saturation via pulse oximetry. E. Observation of patient's color, respiratory effort, and responsiveness. F. Measurement of heart rate and respiratory rate. G. Complete a three-step ambulatory oxygen saturation when ordered. VI. Initiation:  Upon receipt of an order for oxygen, the RCP will:   G. Verify order in the patient's EMR, which should include the desired oxygen saturation to be maintained. H. The patient shall be placed on oxygen with humidity (except for those oxygen delivery devices that do not require humidity, i.e. venturi masks and non-rebreather masks) as ordered by the physician to achieve the prescribed oxygen saturation. I. In the event that no saturation is specified, a saturation of 90% will be maintained. J. Patients, who are found to have a SaO2 of 88% or less, may be started on supplemental oxygen as described above. K. Patients admitted with cardiac problems/disease shall be maintained at 92% per Chest Committee recommendation. L. The patient will be informed of the \"no smoking policy\" and instructed in the proper use of oxygen therapy. M. Once desired oxygen saturation has been achieved, the RCP will document FIO2 and oxygen saturation in the respiratory section of the patient's EMR. VII. Maintenance:   E. 30-second oxygen saturation check will be taken to maintain the saturation ordered by the physician each day. F. Patients will be assessed each shift and as needed by pulse oximetry to determine if oxygen needs to be decreased, increased or discontinued. G. If changes in FIO2 are indicated, all changes will be documented in the respiratory section of the patient's EMR.    H. If no changes in FIO2 are required, the patient's oxygen flow rate and saturation will be recorded in the respiratory section of the patient's EMR. I. Per [facility], patients who are receiving oxygen therapy but are not on oxygen at home, should be weaned off oxygen as soon as possible or when anticipated discharge becomes evident. J. Oxygen will be discontinued after oxygen saturation has been maintained for 24 hours on room air and documented in the patient's EMR. K. Patients on the Inpatient Rehabilitation area on 9th floor will be exempt from having their oxygen discontinued per protocol. Oxygen may be weaned but will be changed to prn to meet the needs of the patient when exercising and participating in therapy. L. The goal of oxygen therapy is to maintain patients with a diagnosis of COPD at oxygen saturation between 88% - 92% to comply with GOLD Guidelines. VIII. Safety: RCP will address the following safety issues:  C. Identify patient using the two patient identifiers name and birth date via ID bracelet. D. Perform hand hygiene per hospital policy utilizing Standard Precautions for all patients and following transmission-based isolation as indicated per hospital policy. E. Cardiac patients will be maintained at an oxygen saturation of 92%. F. If a patient's FIO2 requirements necessitate changing oxygen delivery devices to a high concentration of oxygen, documentation indicating the change must be included in the progress notes, as well as in the respiratory flowsheet. G. If a patient has a hemoglobin level <8 mg. RCP will consult physician before discontinuing oxygen. IX. Interventions:   C. RCP will assess patient for signs of respiratory distress or suspicion of CO2 retention. D. An ABG may be obtained for patients exhibiting respiratory distress or sickle cell      crisis. E. An order should be entered into patient's EMR for ABG under per protocol.     X. Documentation  D. Document assessment findings in the respiratory section of the patient's EMR. E. Document changes in therapy per protocol in the respiratory orders section and in the care plan section of the patient's EMR. F. Document patient education in the patient education section of the patient's EMR. XI. Reportable Conditions:  Report to the physician immediately:  B. Acute changes in patient's respiratory status. C. An oxygen saturation <85%. D. A change in oxygen delivery device to provide a high concentration of oxygen. XII. Patient Instructions: Review with Patient  B. Purpose of oxygen therapy  C. Proper technique for using oxygen  D. No smoking policy    Approval: Pulmonary Committee (1-25-96)  Revision: Chest Committee (4-28-05)    L - Respiratory Care Department Policy, Procedure and Protocol Guideline Manual, 1995,         BERT Chen. L - Therapist Driven Respiratory Care Protocols  A Practitioner's Guide for Criteria-Based       Respiratory Care by Liya Otto M.D., and BERT Steele, CHRIST. L - The rationale for therapist-driven protocols: an update. Respiratory Care 1998. N  [facility] Clinical Practice Guidelines. Respiratory Care Services     Policy Number: -[id number]    Title: Noninvasive Positive Pressure                         Ventilation, (BIPAP VISION) and            Respironics (V60)    Effective Date: 06/2005, 06/2017    Revised Date: 9/2012, 07/2014, 07/2015, 1/2016   I. Description: Non Invasive ventilation (NIV) is a ventilatory assist technique used as an alternative to endotracheal intubation. NIV is pressure ventilation delivered as BIPAP (Bi-level Positive Airway Pressure) which is a low pressure electronically driven device intended for use as a ventilatory support system for patients who have an intact respiratory drive. The device provides non-invasive ventilatory assistance through the use of a nasal or full face mask.  BiPAP  (two levels of ventilatory support) known as inspired positive airway pressure (IPAP) and  positive airway support (EPAP). One level of support can also be delivered which is delivered as EPAP or CPAP which is delivered throughout the respiratory cycle. The aim is to maintain adequate ventilation and minimize the effort of breathing. The BIPAP Vision System and the Respironics V60 are the two systems available for non-invasive ventilation. These devices are also capable of being used for invasive ventilation in the critical care units only. BIPAP Vision: This device uses an electronic pressure control sensing monitor pressure differential in the patient circuit. This feedback allows for adjustment of the flow and pressure output  to assist in inhalation or exhalation through the administration at two distinct levels of positive pressure. During inspiration, the level is variably positive and is always higher than the expiratory level. During exhalation, pressure is variably positive or near ambient. In addition, this device has the ability to compensate for leaks through automatic               adjustment of the trigger threshold. This capability allows for the application of BIPAP    for mask-applied ventilation assistance. Respironics V60  The Respironics V60 uses Auto-Trak technology to help ensure patient synchrony and therapy acceptance and is designed to address the specific challenges of NIV. By providing auto-adaptive leak compensation, inspiratory triggering, and expiratory cycling, Auto-Trak delivers optimal synchrony in the face of dynamic leak and changing patient demand. The Respironics V60 is designed to include pediatric use and is equipped with several modes, which allows the practitioner to meet the specific needs of the patients. See Appendix 1 for definitions of settings. II.  Policy: The administration of non-invasive positive pressure ventilation (NPPV) will be the responsibility of the Respiratory Care Practitioner (RCP). Upon receipt of a physician order, proper patient instruction, set up and monitoring will be provided to ensure patient understanding, compliance and proper utilization of prescribed therapy. A. A patient may be allowed to use their own NPPV machine after having their equipment checked and approved by [employer]. B. A consent and Release for Home Ventilator form must be signed by the patient and witnessed by a health care provider if the patient chooses to use his home ventilator. C. A patient that is being treated for acute respiratory failure and placed on non-invasive ventilation must be placed in a critical care unit, per Medical Director. D.  A patient who is being treated for sleep apnea may be treated on the floors. E.   A patient has the option of using a hospital owned NPPV unit. F.   A patient may use a hospital unit and their own mask if they choose. G. Patients may be placed on full face mask with NPPV units on the hospital floors            under the following conditions:  1. Patient has an end stage disease process. 2. Patient was placed on full face mask and NPPV unit in the Critical Care Units and it has been established as safe and effective therapy. 3. Patient is ordered full face mask with NPPV unit for home use. 4. In the event patient is to be set up or transitioned to home on a NPPV unit, the Respironics V60 (in the AVAPS mode) shall be utilized while the patient is in the hospital. If a Arlyce Altes is unavailable, a home NPPV unit may be provided by the DME provider for no more than 48 hours. III. Responsibility: Director, [place], and all Respiratory Care          Practitioners with documented competency. IV. Indications for non-invasive ventilation:  A. Acute respiratory failure (Patient must be in Critical Care Unit)  B. Chronic respiratory failure  C.  Alveolar hypoventilation  D. Documented sleep apnea  V. Contraindications:  A. Patients with severe respiratory failure without a spontaneous respiratory drive  B. Noninvasive ventilation may be contraindicated for patients with the followin. Inability to maintain a patent airway or adequately clear secretions  2. Acute sinusitis or otitis media  3. Risk for aspiration of gastric contents  4. Hypotension  5. Pre-existing pneumothorax or pneumomediastinum  6. Epistaxis  7. Recent facial, oral or skull surgery or trauma  8. history of allergy or sensitivity to mask materials where the risk from allergic reaction outweighs the benefit of ventilatory assistance  C. Potential Complications:  1. Cardiovascular compromise  2. Skin breakdown and discomfort from mask  3. Gastric distention  4. Increased intracranial pressure  5. Pulmonary barotraumas    VI. Mask fit  A. It is essential that the patient be correctly fitted with an appropriate size mask. 1. Choose mask according to sizing template on disposable setups. 2.  The mask should fit comfortably on the patients nose,  not occluding the            nares and the base of the mask should fit comfortably between the chin and        bottom lip see picture on setup guide. 3.  Headgear should fit comfortably not tight. The bottom edge of the headgear        should sit at the base of the nape of the neck with side straps underneath the        earlobes. 4. The face masks are better for patients who are dyspneic and tachypneic as            they tend to mouth breathe with a nasal mask and reduce the effectiveness          of the ventilation. 5. Masks that are too tight are uncomfortable and cause pressure areas and              masks that are too loose leak which reduce the efficiency of the system. 6. When using a nasal mask the patient must keep their mouth closed to obtain       the desired effect of the ventilation.    7. Place an Allevyn Gentle Border dressing over bridge of nose to avoid skin          breakdown. VII. Equipment for BIPAP Vision  A. BIPAP Vision Ventilatory Support Unit  B. BIPAP Vision disposable circuit with proximal pressure and exhalation port. C. Main flow bacterial filter  D. Nasal or face mask and disposable head strap  F. Smooth inner lumen tubing for connecting the humidifier system to the BIPAP unit. G. Pulse oximetry equipment and supplies  H. Oxygen analyzer with circuit adapter  I. Device specific humidification system which must be used when using BIPAP. VIII. Procedure for Non-invasive ventilation via BIPAP Vision:    A. BIPAP Vision Set Up  1. Assemble the circuit with exhalation port proximal to the patient. 2.  A bacterial filter and oxygen analyzer should be place between the                             machines patient interface port and the patient circuit. If using the oxygen               module, connect to a 50 psi oxygen source. 3. Attach humidifier to the system. 4. Plug electrical cord in AC outlet. Press START on the back of the machine. 5. The Vision will perform a self-test as indicated by the display screen,                        System Self-Test in Progress.   6. Perform the Test Exhalation Port second button from top, left of screen. a) Insure that whistle port and pressure tubing are in line. b) Occlude end of whistle port at end of tubing throughout the test.  c) Press START TEST, top button right screen; this tests the leak of the            circuit. 7. Assess appropriateness of physicians orders and set ventilatory parameters accordingly. Initial settings as well as changes to ventilatory parameters must be accompanied by a physician order. 8. Select the proper mode by first selecting the mode button at the bottom of screen. a) Choose CPAP or ST mode, top button, right side of screen per  order.   b) Activate view mode by pressing the [facility] button, bottom right of screen. 9. Select the parameters button below the screen. a) Choose a parameter from the left and right sides of screen. Press the soft button for the parameter of choice. Once it is highlighted, spin knob clockwise to increase value, and counter clockwise to decrease value in the parameter block. Repress the button for that particular parameter to activate the new value. b) Consult the BIPAP Vision Ventilatory Support System Clinical Manual for specific information on the modes of operation and set parameters. 10.   Select alarms button, below screen. Set values for Hi Pressure, Lo Pressure, Lo      Pressure Delay, Apnea, Lo Min Vent, Hi Rate, Lo Rate as appropriate for patient. B. Post Procedure -Cleaning and Sterilization for the BIPAP Vision  1. Before cleaning the unit, turn the Start/Stop switch to the Stop position and unplug the electrical cord from the wall and from the rear of the unit. 2. Clean the front panel with water or 70% Isopropyl Alcohol only. Do not immerse the Vision unit in water. 3. Clean the exterior of the enclosure with a hospital approved disinfectant solution. Do not allow any liquid to enter the inside of the ventilator. 4. Refer to the BIPAP Vision Ventilatory Support System Clinical Manual, Chapter 15 Cleaning and Routine Maintenance and Replacing the [facility]. IX. Procedure for non-invasive ventilation using the V60:  A. Set up machine circuit using disposable mask and circuit setups only. 1.  Ensure that there is always an exhalation port at the base of the mask for C02 to be . 2. Set mode and settings as per physician orders. 3.  See Appendix 1 for definitions of all modes, usual and alarm settings. 4. Set Alarms on machine. See usual alarm settings in appendix. 5.  Turn on machine and gently hold mask over nose/face until patient becomes accustomed to the airflow. 6. Attach the head strap.    7. Stay with patient until the 02 saturations and observations are stable. B. Monitor patients response for:   1. Decreased Heart rate, respiratory rate, and blood pressure. 2. Decreased sweating   3. Decreased work of breathing (as per baseline observations)   4. Patient feels more comfortable  5. Patient finds it easier to breathe     X. Monitoring:  A. While in use, the RCP should check the patient and non-invasive unit no less than every four hours. B. Failure of NIV should be suspected if:   1. The patient is unable to maintain adequate oxygenation, decreasing 02 saturations despite increases in 02.  2. There is a reduction in neurological or conscious state. 3. The patient has excessive secretions or increasing respiratory rate. 4. Failure of PaCO2 or PH to improve on ABG sample. 5. The patient has poorly compliant lungs. XI. Weaning                A. Weaning or cessation of non-invasive ventilation should occur under the following                        conditions:  1. PaC02 returns to normal and patient maintains O2 saturations with minimal                oxygen. 2. Respiratory rate is returned within normal limits. 3. Patient is unable to tolerate non-invasive ventilation. 4. Patients dyspnea is reduced. 5. Patient exhibits normal overnight ventilation. 6. Patient is receiving palliative treatment. XII. Documentation:  A. Documentation should include the followin. Ventilator settings comply with physician order. 2. Ventilator is functioning properly as evidenced by a check of measured volumes, rates, pressures and FIO2. 3. Alarms are set appropriately. 4. Measured inspired gas temperature (invasive mode only)  5. Vital signs (pulse, respiratory rate, oxygen saturation, breath sounds)  6. Patient tolerance to therapy.   7. Manual resuscitator and appropriate size mask at patient bedside      REFERENCES  BIPAP Vision [address] [id number] Therapy and Respiratory Care Section. GERARD Centeno 2001. Introducing non-invasive positive pressure ventilation. Nursing Standard. 15,26, 42-45. Baltazar Brasher. 2003. Using non-invasive ventilation in acute wards: part 2. Nursing Standard. 18,1, 41-44. Shivani 47. Use of continuous positive airway pressure (CPAP) in the critically ill-physiological principles. Critical Care 12 (4 154-58     LEIGH García2002. Bi-level positive airway pressure (BiPAP) and acute cardiogenicpulmonary edema   ( ACPO) in the emergency department. Critical Care 15 (2):51-63    ALICE Khan2002. Non-invasive BiPap-implementation of a new service. Intensive and Critical Care Nursing 90,[phone]     LEIGH Gu,et al 2002. Non-invasive ventilation in acute respiratory failure. [place]ax 57:192-211       DEFINITIONS AND USUAL SETTINGS                                                            APPENDIX 1                                                             Settings   Settings in  Active   CPAP Settings in  Active   BiPAP Description Range Usual Setting  Used in NIV         CPAP Mode                     Continuous Positive Airway  Pressure   4-24 cmH20 8-14     ST or  BiPAP MODE                           Spontaneous Timed or BiLevel Positive Airway Pressure Ventilation. Two level system of alternating during non- invasive ventilation in sync with breathing-set IPAP and EPAP      __     __   AVAPS Mode    (only available  on V60)          The volume-targeted AVAPS (average volume-assured pressure support) mode combines the attributes of pressure-controlled and volume-targeted ventilation. __     __       EPAP                                       Positive Airway Pressure. Recruits under ventilated alveoli to remain open during expiration by providing a constant pressure throughout resp. cycle.  Must be less than or equal to IPAP    4-25 cmH20 5-14                 Settings Settings in Active CPAP Settings in Active BiPAP Description Range Usual Setting Used in NIV     IPAP                           Inspired Positive Airway Pressure provides pressure throughout the inspiratory phase to support patient ventilation  4-40 cmH20 10-20               I-time                    Inspiratory time- time taken to inspire in seconds  0.3  3.0 sec 1:3   FIO2                   Oxygen delivered  21- 100% As ordered         Ramp time                                      The Ramp Time function helps your patient adapt to ventilation by gradually increasing inspiratory and expiratory pressure over a set interval (minutes). This time gradually delivers pressures so to reduce patient anxiety and increases comfort. Off  or  5-45 minutes 10 minutes                   Rate (RR)          Patients respiratory rate 4- 60 BPM 4     Rise time          Speed at which the inspiratory pressure rises to the set pressure. 1-5  (1 is the fastest) Set at 3                   Patient Data  Data Description Range Usual setting in NIV   Breath phase/trigger Indicator  Bar in left hand corner. Colored according to breath trigger. n/a n/a   PIP Positive Inspiratory pressure  0-50 n/a   Patient total leak Est. or unintentional leak  0-200 n/a   Patient trigger Patient triggered breaths as a percentage  0-100% Should be 100%   Respiratory rate Respiratory rate 0-90 n/a   Ti/Ttot Inspiratory duty cycle or inspiratory time divided by total cycle time  0-91% n/a   Minute volume Est. minute ventilation.  TV x rate=MV  0-99 LPM n/a   Tidal volume Est.  tidal volume  0-3000 ml n/a     Alarms  Alarm Description Range Set  at   [facility] Rate High respiratory rate 5-90 10 breaths above patients breath rate   Low Rate Low respiratory rate alarm 1-89 BPM 5 breaths below patients breath rate   Hi VT High tidal volume alarm 200-2500 ml 200 ml above patients own   Low VT Low tidal volume alarm OFF  1500 ml OFF   HIP High Inspiratory pressure alarm 5-50 cm H20 10 cm above IPAP   LIP Low inspiratory pressure alarm OFF, 1-40 cmH20 OFF   Lo VE Low minute vent alarm OFF, 0.1 to 99L/min OFF   LIP T Low inspiratory delay time 5-60 seconds 5 seconds       CPAP Settings BiPAP Settings     Set CPAP level as ordered Set breath rate as ordered     Set FIO2 as ordered Set I-time as 1.3     Set Ramp time as ordered Set EPAP as ordered     Set C-Flex at 3 Set IPAP as ordered      Set Rise time as ordered      Set FIO2 as ordered                                      Respiratory Care Services  Consent and Release for Home Ventilator      Patient Name: _________________________________________ Room: ___________    SSN: _______________________________           Account Number:  __________________    Use and care of my personal home ventilator, (invasive or non-invasive) mechanical life support device while at [facility] system has been discussed with me by my physician and I have been provided with an opportunity to ask questions which have been answered to my satisfaction. I also understand a respiratory care practitioner is not expected to remain in my room or general vicinity at all times. It is understood that every precaution consistent with the best medical practice will be taken and I give Respiratory Care Services permission to monitor my ventilator during my hospital stay. I hereby release [place] [address] system, its agents, employees and medical staff from liability arising from any and all claims, demands, losses and damages to person and/or property as a result of the above mentioned requests. I certify that I have read and understand this consent agreement and release and that I am legally qualified to prosper this authorization. ___________________  Date    _____________________________________        ________________________  Signature of Patient or Parent (of minor patient,                  Relationship (if other than Patient)  longterm parent) if applicable.  Closest Relative,  Guardian or legal Representative    _____________________________________  Witness  Legal representative is defined as person named by the court as a guardian, executor or , or having power of  specifically set forth to authorize this action. [facility] [phone] (3/02, 7/14)   Consent and Release for Home Ventilator        Respiratory Care Services     Policy Number: -[id number]    Title: RU Ventilator Weaning Protocol    Effective Date: 10/2000    Revised Date:  05/2009, 07/2019     Reviewed Date: 06/2013,  05/2014, 07/2015, 03/2016, 06/2017, 05/2018       I. Policy:Upon physician's initial order, this protocol will be implemented for those patients who meet the criteria for weaning from mechanical ventilation. II. Purpose: The respiratory care practitioner will utilize the following protocol to provide the most efficient and effective ventilator weaning and extubation. III. Responsibility: Director, Bunny Anne, and all Respiratory Care Practitioners with documented competency. IV. Procedure: Management of Ventilator, Extubation, and Post-Extubation Process  Refer to the Fast Track Cardiac Surgery Protocol Policy Number:  -[id number] [email or web address] doc     V. Documentation  G. Document findings in the respiratory section of the patient's EMR. 9. Document when ventilator is discontinued. 10. Document when airway is removed. B.  Document the following in the Progress Notes:  7. mechanics  8. extubation   9. patient tolerance of extubation  10. respiratory delivery device placed on patient    C. Document changes in therapy per protocol in the respiratory orders section and in the         care plan section of the patient's EMR. D. Document patient education in the patient education section of the patient's EMR.         References:  L -  Therapist Driven Respiratory Care Protocols  A Practitioner's Guide for Criteria-Based Respiratory Care by Mian Lopez M.D., and BERT Feng RRT. N  [facility] Clinical Practice Guidelines, Evidenced Based Guidelines for Weaning and Discontinuing Ventilatory Support. .                  Guideline     Guideline Number: -[id number]     Title: Management of the Patient with Mechanical Ventilation (including weaning) and ABCDE Bundle    Effective Date:  03/00    Revised Date: 02/09, 03/10, 7/12, 5/13,                                  10/13, 8/14  Reviewed Date: 07/2015, 04/2016, 06/2017       I. Policy:  Management of the patient requiring mechanical ventilation, including readiness to wean and weaning protocol. The information provided serves as a guideline for patient management. Included in this guidelines is the ABCDE Bundle to provide guidance to staff for evidence based management of pain, agitation/anxiety and delirium in the intensive care unit. The goals of critical care analgesia and sedation are to facilitate mechanical ventilation, to prevent patient and caregiver injury, and to avoid the psychological and physiologic consequences of inadequate treatment of pain, anxiety, agitation, and delirium by maintaining a light level of sedation. Pain occurs commonly in adult ICU patients, regardless of admitting diagnosis. Therefore, pain should be frequently assessed and analgesic medications titrated to prevent adverse effects associated with either inadequate or excessive analgesia. Once pain has been addressed, anxiolytic and/or antipsychotic medications can be utilized to treat unresolved agitation/anxiety and delirium, with the goal to prevent over- or under sedation by using the Mathew Agitation Sedation Scale (RASS). Assertive management of these issues has been shown to reduce costs, improve ICU outcomes such as successful extubation and ICU length of stay, and allow for patients to participate in their own care. II. Purpose: The respiratory care practitioner and the critical care RN will utilize the following guideline to provide the most efficient and effective management of mechanical ventilators and weaning and extubation processes. Goals of Treatment:  1. Adequate management of patients pain and discomfort while maintaining a light level of                   sedation (RASS score of 0 to -1)  2. Both chronic and acute sources of pain should be identified and treated. 3. Sedative agents should be considered if patient still expresses discomfort and/or is not at RASS         goal of 0 to -1 despite adequate management of pain. 4. Patients requiring neuromuscular blockage must have continuous infusions of analgesic and              sedative agents. III. Responsibility: Director Respiratory Care Services and all Respiratory Care Practitioners  with documented competency as well as Critical Care RN staff. General Guidelines    1. Introduction to Ventilator Plan Phase  a. Ventilator Management Phase, General Statement  -  The plan should be initiated in patients who have a secure airway/require invasive mechanical ventilation (endotracheal or tracheostomy) only  -   The provider determines the appropriate medications used for analgesia and agitation/anxiety along with the clinical pharmacist    2. Monitoring Levels of Comfort  a. Pain Assessment  - Pain is monitored using the numerical scales  - A pain assessment should be conducted, at a minimum, every 4 hours and as needed and per guidelines. - The level of pain should be determined as satisfactory by the patient based on patients baseline level of pain , considering any chronic pain that the patient may have.   - If the patient is unable to communicate pain level, the nurse can assess for nonverbal indicators including facial grimacing, moaning, tachypnea, tachycardia, hypertension, diaphoresis, etc as a cue to begin further pain assessment. b.  Sedation Assessment  - Sedation is monitored using the Mathew Agitation Sedation Scale (RASS)  Target RASS RASS Description   +4 Combative, violent, danger to staff     +3 Pulls or removes tubes(s) or catheters; aggressive   +2 Frequent nonpurposeful movement, fights ventilator   +1 Anxious, apprehensive, but not aggressive   0 Alert and calm   -1 Awakens to voice (eye opening/contact) > 10 sec   -2 Light sedation, briefly awakens to voice (eye opening/contact) < 10 sec   -3 Moderate sedation, movement or eye opening. No eye contact   -4 Deep sedation, no response to voice, but movement or eye opening to physical stimulation   -5 Unarousable, no response to voice or physical stimulation     -  Goal RASS is 0 to -1, unless otherwise specified by providers order.  - Nursing staff should conduct the RASS every 4 hours and as needed to maintain goal RASS of 0 to -1.  - If RASS is outside of goal range, discuss treatment options with provider.  - A RASS score of +2 to +4 requires further assessment by the nurse. Causes of agitation/anxiety that should be considered include:  a. Pulmonary -   endotracheal tube malposition or patency, mode of ventilation, pneumothorax, hypoxemia, hypercarbia  b. Metabolic  hypoglycemia, hyponatremia, acute renal or hepatic failure  c. Emotional upset  with information and awareness of critical condition, prognosis, need for surgical or invasive procedures, other interventions or complications, family or personal stressors  - C. Sedation Assessment while using Neuromusclar Blocking Agents  - D. Delirium Assessment  a. the ICU (CAM  ICU)   3. Analgesia  The incidence of significant pain has been reported to be 50% or higher in both medical and surgical ICU patients.   These patients also experience discomfort during routine/procedural ICU care and at rest.  However, patients may be unable to self-report their pain (either verbally or with other signs) because of an altered level of consciousness, the use of mechanical ventilation, or high doses of sedative agents or neuromuscular blocking agents. The short and long term consequences of unrelieved or inadequately treated pain are significant and include patient discomfort, decreased satisfaction with care by family and patient, delirium, agitation/anxiety, post traumatic stress disorder and depression. Therefore, routine assessment and treatment of pain should occur in all ICU patients. Causes and Treatment of Pain in the ICU  a. Acute pain (post-operative, procedural pain, discomfort with usual ICU care or other acute episodes of pain-related to underlying disease)  1. Consider use of PCA for alert and oriented patients with pain needs not met by PRN dosing or opoids. 2. Preemptive analgesia should occur prior to chest tube removal, and should be considered for other procedural pain such as turning and repositioning, would drain removal, wound dressing change, tracheal suctioning, femoral catheter removal or place of central venous catheter. 3. Appropriate analgesic medications for preemptive analgesia are short acting intravenous (IV) agents (i.e. fentanyl, morphine, hydromorphone)  a. Administration of analgesia before patient experiences noxious stimuli prevents amplification and hyperexcitability of the central nervous system. b. Analgesia for Mechanically Ventilated Patients:  1. The approach to sedation and analgesia management for mechanically ventilated patients favors use of analgesia first sedation. The primary goal of this strategy is to address pain and discomfort first, and then if necessary, add anxiolytic agent. 2. Analgesia first sedation reduces dose escalation of medications, decreases the duration of mechanical ventilation and the incidence of VAP, improves the probability of successful extubation, and ultimately shortens ICU length of stay.    3. For pain management, analgesic medications are determined by the provider. Intermittent dosing of the analgesic should be attempted first.    If the patient requires more than 3 doses within 1 hour then provider should be contacted to consider continuous infusion. 4.  Analgesic options for mechanically ventilated patients include:  a. Fentanyl which is considered the drug of choice for patients requiring continuous infusion. b.Morphine may be considered for those patients without renal dysfunction who are hemodynamically stable and require intermittent pain medication. Continuous infusions of morphine may be used for patientl who are receiving comfort care as part of end of life care. c.Hydromorphone is reserved for patients who are refractory to fentanyl or morphine and is typically admininstered by intermittent dosing. 4.  Agitation/Anxiety    Background  Agitation and anxiety frequently occur in ICU patients. Anxiolytic/sedation agents may be indicated to help relieve discomfort, improve synchrony with mechanical ventilation, and decrease the overall work of breathing. Pain control alone may be sufficient to make patients comfortable enough to require no anxiolytic/sedative agent. In addition, non-pharmacologic interventions such as repositioning or verbal assurance may be helpful to comfort or redirect an agitated patient. If these methods are unsuccessful, then anxiolytic/sedative medications such as propofol, dexmedetomidine, or benzodiazepines can be used. Selection of an anxiolytic should be based on the pharmacokinetic properties of the medication, patient specific characteristics, and sedation goal.   However, nonbenzodiazepine sedatives (ie propofol or dexmedetomidine) may be preferable over benzodiazepines (ie midazolam or larazepam) due to more favorable outcomes such as delirum. Causes and Treatment of Agitation/Anxiety  a.  Possible underlying causes of agitation and anxiety include pain, delirium, hypoxemia, hypoglycemia, hypotension, or withdrawal from alcohol  and other drugs. b. Analgesia first sedation should be attempted initially to manage pain and provide sedation in appropriate patients. Analgesia alone may be adequate to reach RASS goal of 0 to -1. If patient remains agitated or anxious despite adequate analgesia (ie RASS +2 to +4) then anxiolytic/sedative should be considered. c. The choice of anxiolytic should be based on desired levels of sedation (ie light sedation or deep sedation) with preference for the use of nonbenzodiazepines such as propofol or dexmedetomidine if appropriate. While light sedation (ie RASS 0 to -1) is preferred for most patients, there are instances when deep sedation (ie RASS -4 to -5) is desired. For example, in the setting of ventilator dysynchrony due to ARDS or for patients receiving NMB agents. d. Medications to maintain light sedation (ie RASS 0 to -1) include  1. Propofol continuous infusion can be considered for hemodynamically stable (ie SBP = 100, MAP = 65 and/or not requiring vasopressor support) patients requiring light sedation. Propofol has a quick onset (1-2 minutes) and offset of action, making it a good agent to assess neurological status and facilitate liberation from the mechanical ventilator. 2.Dexmedetomidine continuous infusion is a good option for hemodynamically stable patients requiring light sedation as it allows for a more awake, interactive patient is associated with less delirium. It has an intermediate onset of action (5-10 min). Therefore, abrupt titrations should be avoided, but use of prn haloperidol or benzodiazepine may be useful to manage agitation until the medication takes effect. 3. Antipsychotics are another option. In particular, haloperidol intermittently dosed may be useful for patients with symptoms of agitation/anxiety and delirium.   4.Benzodiazapines can also be considered for light sedation, but should be intermittently doses.  Midazolam is an option for patients without renal dysfunction. It has a short onset of action (2-5 minutes) making it a good agent for acute agitation/anxiety, but short duration of action resulting in frequent dosing. Lorazepam is another option. It has a longer onset of action (15-20 minutes) in comparison to midazolam, but longer duration of action. e. Medications to maintain deep sedation (RASS -4 to -5) include:  1) Propofol continuous infusion should be considered as a first line option for hemodynamically stable patients. 2)Benzodiazepines can be considered as second line options for deep sedation. Studies comparing these agents to other sedatives have shown that they lead to worse outcomes including delirium, oversedation, delayed extubation, and longer time to discharge. Midazolam is one option for patients without renal dysfunction and lorazepam is another options. If patient requires more than 3 doses within 1 hour then contact provider  to consider initiation of continuous infusion. 5.  Daily Sedation Awakening Trial (SAT) from IV Continuous Analgesia/Sedation  a. Patients are to have daily awakening from sedation while on continuous IV analgesia and/or sedation in the ICU. Continuous analgesia infusions may be maintained only if needed for active pain and RASS is at goal 0 - -1. Unit guideline is for the SAT to occur following ICP rounds each morning.    b. The sedation awakening trial (SAT) is done regardless if the patient meets criteria for spontaneous breathing trial (SBT). c. SAT safety screen is assessed and SAT should not be performed if sedation is being used for active seizures, alcohol withdrawal, hemodynamically unstable or requiring support of vasoactive medications , in conjunction with NMB agents, if ICP is greater than  20mmHg or if sedation is being used to control ICP, patients RASS is +3 or +4 (very agitated or combative).   Other exclusion criteria are:  if there is documentation of myocardial ischemia in the past 24 hours; or patient is receiving high frequency oscillator ventilation (HFOV) , if the patient has an open chest /abdomen or is receiving comfort care. d. Criteria for passing the SAT are the patient opened their eyes to verbal stimuli or tolerated sedative interruption without exhibiting failure criteria.  e. Patients fail the SAT if the develop sustained anxiety, agitation, or pain; a respiratory rate of 35 per minutes for 5 minutes or longer, an SpO2 less than 88% for 5 minutes or longer; an acute cardiac dysrhythmia; two or more signs of respiratory distress including tachycardia, bradycardia; use of accessory muscles; diaphoresis; marked dyspnea; or myocardial ischemia. f. Respiratory therapy staff must verify with the nurse that continuous IV analgesia (unless being use  for active pain) and sedation (unless patient is receiving dexmedetomidine) is off prior to placing patient on SBT. g. DO NOT interrupt infusion of analgesia and sedation medications if patient is receiving neuromuscular blockade.  h. Monitor level of wakefulness unless patient is awake and follows commands (RASS 0 to -1) or patient becomes uncomfortable or agitated (RASS +3 to +4)  i. If agitation prevents successful awakening , administer bolus of analgesia and/or sedation then resume infusion of the medication at ½ previous dose and titrate as needed.  j. If oversedation prevents successful awakening, hole infusion until at goal and resume ½ of prior infusion rate/dose if clinically indicated. 6. Delirium  Background  Delirium is characterized by the acute onset of cerebral dysfunction with a change or fluctuation baseline  mental status, inattention, and either disorganized thinking or an altered level of consciousness.     It affects up to 80% of mechanically ventilated adult ICU patients, and is associated with increased mortality,   and treatment is important and may in turn allow for a patient to be conscious yet cooperative enough to participate   in ventilator weaning trials and early mobilization efforts. Delirium can only be assessed in patients who are able to sufficiently interact and communicate with bedside  clinicians (ie RASS -3 to +4). IV. Procedure:  A. Assessment: The following criteria must be assessed prior to the initiation of weaning from mechanical ventilation. Note: The criteria are general guidelines and must be individualized for each patient. The patients primary nurse will be responsible, in coordination with the RT, the Spontaneous Awakening Trial). The RT will perform the SBT. B. Spontaneous Awakening Trials (SATs  also referred to as Sedation Vacation) and Spontaneous Breathing Trials (SBTs) performed to determine readiness to wean. 1. For patients who meet established criteria, such as those without active seizures, alcohol withdrawal and agitation, myocardial ischemia or those requiring cardiac support devices, without increased intracranial pressure and those not receiving neuromuscular blockade, the nurse will reduce the infusions of sedative by 50% of current used for sedation that was used to achieve a level of light sedation (Ferguson Score 2 or RASS score of 0 to -1) and evaluate patient response to reduction of sedation. Analgesics required for pain control are continued during the test.  Obtain MD order to cover no SAT for that time period if patient has any exclusion criteria as described above. 2. Failure of the spontaneous awakening trial occurs when the patient shows symptoms such as increased agitation, anxiety, pain or signs of respiratory distress including respiratory rate >35/min or oxygen saturation <88% as well as development of acute cardiac arrhythmias.   If these symptoms develop during the SAT, the nurse then restarts sedation at 75% of the previous dose and titrates the medications until the patient is comfortable and/or symptoms have abated. 3.  If the patient passes the SAT then the patient moves on to the Spontaneous Breathing Trials as performed by the RT. The SBT Safety Screen included the following:  No agitation, oxygen saturation > 88%, FIO2 < 50%, PEEP < 7.5 cm H20, no myocardial ischemia, no vasopressor use, and with inspiratory efforts. 4. Patients who pass the spontaneous awakening trial but fail the spontaneous breathing trial are placed back on full ventilator support and reassessed the next day. 5. Failure of the SBT (spontaneous breathing trail) includes any of the following:  Respiratory rate > 35/min, respiratory rate < 8/min, oxygen saturation < 88%, respiratory distress, mental status change, acute cardiac arrhythmia. 6. Extubation is considered for patients who tolerate the spontaneous awakening trial and pass the spontaneous breathing trial.    C. Can the cause of respiratory failure be reversed (i.e. absence of high spinal cord injury or advanced ALS)? D. Is gas exchange adequate? 1. PaO2/FIO2 ratio > 150  200,  2. PEEP < 8 cm H20  3. FIO2 < 50  4. pH > 7.30  5. Rapid shallow breathing index (f/VT) < 105  E. Is patient hemodynamically stable? 1. Absence of clinically significant hypotension (minimal vasopressors such as Dopamine < 5mcg/kg/minute)? F. Is there evidence of intact respiratory drive (NIP/NIF >-49 HEA75, stable VC02)? G. Does patient have an adequate cough, airway clearance ability? H. Is there absence of excessive secretions? V. Initiation:     A. The therapist shall consult with RN to determine if sedation can be discontinued or significantly decreased. If this can be achieved, the therapist shall implement the                     followin. Identify patient and verify name and account number via ID bracelet.   2. Perform hand hygiene per hospital policy utilizing Standard Precautions for all patients and following transmission-based isolation as indicated per hospital policy. 3. Perform a ONE-MINUTE SPONTANEOUS TRIAL AND ASSESSMENT. 4. Measure Rapid Shallow Breathing Index (RSBI) and monitor SpO2 and cardiovascular parameters during the spontaneous breathing assessment. 5. If SpO2 and cardiovascular parameters are stable, continue spontaneous breathing trial for at least 30 minutes and up to 120 minutes, as patient tolerates. 6. Monitor ventilatory status, SpO2, and cardiovascular status during spontaneous breathing trial.  7. If patient has a successful trial, consider patient as a candidate for extubation and obtain order. 8. If patient fails the weaning trial, place back on ventilator and adjust settings to provide a non-fatiguing form of ventilatory support for the remainder of the day and night. 9. One attempt at weaning shall be performed each day until successful weaning occurs. The RCP will make every attempt to begin the spontaneous breathing trials between 0500 and 0600 to provide documentation of the trial when the pulmonologist makes rounds. B.  Assessment of SBT or PST:  1. Is gas exchange acceptable? 2. PaO2 > 60 mm Hg. 3. PH > 7.30  4. Increase in PaCO2  < 10 mm Hg  C. Is patient hemodynamically stable? 1. HR < 120 beats/minute  2. HR  < 20%   3. Systolic BP < 302 and > 90 mmHg  4. BP  < 20%, no vasopressors required  D. Does patient have stable ventilatory pattern? 1. Sustained RR < 30 breaths per minute  2. Normal and stable VCO2  3. Patient is not demonstrating any signs of increased work of breathing, such as increased use of accessory muscles. E. Mental status stable throughout trial?  1. Absence of changes such as somnolence, excessive agitation or anxiety  2. Absence of diaphoresis during trial?  IV. Safety:    A. The RCP shall monitor patient according to the above guidelines.  If at any time during the weaning process, the respiratory therapist or nurse feels that the patient is not tolerating weaning, the therapist shall place patient back on previous ventilator settings. B. The patient shall be reassessed and the weaning process should be continued the following day. V. Reportable Conditions:    A. The therapist shall notify the physician, as appropriate, for any of the following         conditions:  1. FIO2 increase (sustained) at 10% or greater  2. Poor patient/ventilator interface in spite of adjustments  3. Need for increased sedation for respiratory distress  4. Need for increasing ventilating pressures (i.e. PEEP, PIP, MAP)  5. ABG results meeting panic value criteria or other clinical signs indicating deterioration of patients condition. 6. Unplanned extubation. 7. Unexplained sustained increase in PIP greater than 10 cm H2O.  8. Assessment results regarding ventilator discontinuance process. VI. Ventilator protocol management   A. The following items should be maintained for patients who are being mechanically           ventilated. 1. Obtain STAT Chest X-Ray for ET tube placement after insertion. 2. Chest X-Ray q a.m. while on ventilator. 3. ABGs 30 - 60 minutes after being stable on the ventilator. 4. ABG's q a.m. while on ventilator and prn.  5. Do spontaneous breathing trials when patient is hemodynamically stable, responsive, and without fever. 6. Terminate trials if patient exhibits signs of respiratory distress. 7. Therapists should maintain ABG s as follows:      a. pH -  7.30 - 7.50                   b. PaO2 -   60  100        8. Racemic Epinephrine (0.5cc) for post extubation stridor (2 UA treatments max.)    VII.   Early Mobilization    Mobility Level Criteria Start at Level 1 if:   PaO2/FIO2 <250   Positive end-expiratory Pressure (PEEP) >=10 cm H2O   O2 saturation <90%   Respiratory Rate (RR) Not within 10-30 per min   Cardiac arrhythmias or ischemia New onset   Heart Rate  (HR) <60 or >120 beats per min   Mean arterial pressure (MAP) <55 or >955 mmHg   Systolic blood pressure (SBP) <90 or > 180 mmHg   Vasopressor infusion New or increasing   Mathew Agitation Scale (RASS) < - 3       Level I:   Breathe  (Rass -5 to -3)  HOB Angle  improve VAP protocol compliance   Visually confirm the [facility] is elevated >= 30 degrees to comply with VAP prevention protocols   The Centers for Disease Control and Prevention recommends an HOB angle of 30-45 degrees , unless contraindicated  Additional activities to be implemented   Every 2 hour turning   Passive range of motion   Up to 20 degrees reverse trendelenburg with lower extremity exercise/retracting footboard   Continuous lateral rotation therapy can be considered part of early mobility therapy in patients who are at high risk for pulmonary complications  Move to Level 2 when the patient  - Has acceptable oxygenation/hemodynamics  - Tolerates q 2 turning  - Tolerates HOB > 30 degrees or up to 20 degrees reverse trendelenburg    Level 2 :Tilt  Patient Assessment Rass > -3  (eg, opens eyes, may have profound weakness)  Up to 20 degrees Reverse Trendelenburg position and 10 degrees minimum HOB  - Reverse Trendelenburg positioning allows for orthostatic position in fragile patients  - If available , use in conjunction with retracting foot section to allow for partial weight bearing prior to sitting up in the bed or getting out of bed  Additional activities to be implemented  -  Maintain HOB >/= 30 degrees  - Q 2 hour turning  - Passive/active range of motion  - Legs dependent  - PT consultation       Move to Level 3 when the patient . .    -Tolerates active- assistance exercises 2 times per day    -Tolerates lower extremity exercises against footboard/Up to 20 degrees Reverse Trendelenburg    -Tolerates legs dependent /HOB 45 degrees  Level 3 :  SIT  (Rass >- 1 (eg , weak but may move arms/legs independently)   Full chair position (footboard on)   Full upright positioning allows for diaphragmatic excursion and lung expansion   Sitting with legs in a dependent position facilitates gas exchange  Additional activities to be implemented  - Maintain HOB >= 30 degrees  - Q 2 hour turning (assisted)  - Active range of motion  PT/OT actively involved  - Encourage activities of daily living  - Dangling, if patient can move arm against gravity  Move to Level 4 when the patient . .  - Tolerates increasing active exercise in bed  - Actively assists with every- 2- hour turning or turns independently  - Tolerates full chair position 3 times/day  Level 4:  Stand ( RASS >0 (eg, weak but may tolerate increased activity)      Stand Attempts   Full chair position (footboard off/feet on the floor)   Consider using a sit-to-stand lift   Pivot to chair, it tolerates partial weight bearing  Additional activities to be implemented  - Maintain head of bed >= 30 degrees  - Q 2 hr turning (self/assisted)  - Active range of motion  - Encourage activities of daily living  - PT/OT actively involved      Move to Level 5 when the patient .  - Can successfully comply with all activities  - Tolerates trial periods of full chair position (footboard off/feet on floor) 3 times per day  - Tolerates partial weight-bearing stand and pivots to chair    Level 5 :  Move  (RASS > 0    (eg, weak but may tolerate increased activity)   Achieve out of bed   Utilize mobile floor life to ambulate to bedside chair  Additional activities to be implemented  - Maintain HOB > = 30 degrees  - Q 2 hour turning (self/assisted)  - Active range of motion  - Patient stands/bears weight > 1 minute  - Patient marches in place  - PT/OT actively involved    Patient continues to ambulate progressively longer distances as tolerated until they consistently participate and move independently.         E Approved by [id number] 2-19-09   N [facility] Clinical Guidelines             H[facility]ER [facility] Flowsheet Content Variables to select when addressing section Comments   HOMER Initiated  Yes/No  RN to address minimum q 24 hours (day shift)   Target RASS  0 = alert and oriented   -1 = drowsy   -2 = light sedation   -3= moderate sedation   -4= deep sedation Target on standard ventilator setting should be -2; -4 with oscillator   CAM -ICU  Positive   Negative   Unable to assess Delirium assessment   SAT Safety Screen Passed  Yes   No Select yes if proceeding on to the sedation vacation (reduction of continuous sedative drip by directed by MD)  Select no if your patient has any of the below reasons for not proceeding on to the daily awakening sedation vacation trial   SAT Screen for Failure  Active seizures   Acute delirium tremors   Agitation that threatens accidental line/tube removal   On paralytics   MI (24-48hr)   Abnormal ICP   Open abdomen Select one of the options when the patient will not undergo the sedation vacation  ALSO MUST OBTAIN AN ORDER FOR no SEDATION VACATION written under nursing miscellaneous for now by either the NP or Intensivist   Daily sedation Vacation/assessment of   Yes   No   Not applicable If yes, MUST see the reduction in sedation on the Pascale Repress and please place in the comment section of the sedative sedation vacation started   SBT Safety Screen Passed  Yes   No Select Yes if the patient has none of the below listed reasons for not proceeding on to the SBT following reduction of sedation   SBT Screen Reason for Failure  Agitation   O2 Sat < or = 88%   FIO2 > 50%   PEEP >7   MI   Vasopressor Use   Bilevel setting on Vent   Oscillator in use   Increased resp effort Select reason as appropriate for NOT proceeding on to the SBT                                               Wake Up and Breathe Protocol

## 2020-01-10 NOTE — ANESTHESIA PROCEDURE NOTES
Central Line Placement    Start time: 1/10/2020 7:56 AM  End time: 1/10/2020 8:08 AM  Performed by: Jean Camarena MD  Authorized by: Jean Camarena MD     Indications: vascular access and central pressure monitoring  Preanesthetic Checklist: patient identified, risks and benefits discussed, anesthesia consent, patient being monitored and timeout performed    Timeout Time: 07:55       Pre-procedure: All elements of maximal sterile barrier technique followed? Yes    2% Chlorhexidine for cutaneous antisepsis, Hand hygiene performed prior to catheter insertion and Ultrasound guidance    Sterile Ultrasound Technique followed?: Yes        Ultrasound Image Stored? Image stored    Procedure:   Prep:  ChloraPrep (Use of full body drape after Chloraprep)    Orientation:  Right  Patient position:  Trendelenburg  Catheter type:  Double lumen  Catheter size:  9 Fr  Caudal catheter size: 11.5cm. Number of attempts:  1  Successful placement: Yes      Assessment:   Post-procedure:  Catheter secured, sterile dressing applied and sterile dressing with CHG applied  Assessment:  Blood return through all ports, free fluid flow and guidewire removal verified  Insertion:  Uncomplicated  Patient tolerance:  Patient tolerated the procedure well with no immediate complications  Venous cannulation confirmed with manometry and visualization of wire in vein with real time ultrasound. Central line placed using Seldinger technique. Easy introducer and PAC insertion to 50 centimeters. Sterile tegaderm applied. No complications. CXR to be performed in ICU. Seven step protocol followed.

## 2020-01-10 NOTE — PROGRESS NOTES
TRANSFER - IN REPORT:    Verbal report [facility] from [facility](unit[facility] for routine post - op      Report consisted of patients Situation, Background, Assessment and   Recommendations(SBAR). Information from the following report(s) SBAR, Kardex, OR Summary, Procedure Summary, Intake/Output, MAR and Recent Results was reviewed with the receiving nurse. Per anesthesia on admission patient intubation [address] team agrees of surgeon, anesthesia, RT, and RN agrees to proceed with Slow weaning process        Opportunity for questions and clarification was provided. Assessment completed upon patients arrival to unit and care assumed.

## 2020-01-10 NOTE — PROGRESS NOTES
Cardiovascular ICU Consult Note: 1/10/2020  Rica Gonzalez  Admission Date: 1/10/2020     The patient's chart is reviewed and the patient is discussed with the staff. Subjective:     Patient is seen at the request of Dr. Drew White  for respiratory management status post cardiac surgery. Patient had CABG x 3 and AVR . Currently is sedated in CV-ICU and orally intubated receiving  mechanical ventilation. Apparently when he came he was hypoxic ,hypotensive and hard to ventilate ,also he was difficult intubation. We have been asked to see in the CV-ICU for mechanical ventilation management and weaning. Prior to Admission Medications   Prescriptions Last Dose Informant Patient Reported? Taking? albuterol (PROVENTIL HFA) 90 mcg/actuation inhaler 1/9/2020 at Unknown time  No Yes   Sig: Take 2 Puffs by inhalation every six (6) hours as needed for Wheezing. allopurinol (ZYLOPRIM) 300 mg tablet 1/10/2020 at Unknown time  No Yes   Sig: Take 1 Tab by mouth daily. amiodarone (CORDARONE) 200 mg tablet 1/9/2020 at 1630  Yes Yes   Sig: Take 600 mg by mouth. Take amiodarone 600 mg after 4 pm the night before surgery. ascorbic acid (VITAMIN C) 500 mg tablet 1/6/2020 at Unknown time  Yes Yes   Sig: Take 1,000 mg by mouth daily. aspirin delayed-release 81 mg tablet 1/10/2020 at Unknown time  Yes Yes   Sig: Take  by mouth daily. busPIRone (BUSPAR) 10 mg tablet 1/10/2020 at Unknown time  No Yes   Sig: Take 1 Tab by mouth three (3) times daily. coenzyme q10-vitamin e (COQ10 ) 100-100 mg-unit cap 1/6/2020  Yes No   Sig: Take 300 mg by mouth daily. glyBURIDE-metFORMIN (GLUCOVANCE) 5-500 mg per tablet 1/9/2020 at Unknown time  No Yes   Sig: Take 2 Tabs by mouth two (2) times daily (with meals). Patient taking differently: Take 2 Tabs by mouth Daily (before breakfast). ibuprofen 200 mg cap 1/8/2020  Yes No   Sig: Take  by mouth.    insulin aspart U-100 (NOVOLOG FLEXPEN U-100 INSULIN) 100 unit/mL (3 mL) inpn 2020 at Unknown time  No Yes   Sig: INJECT 45 UNITS UNDER THE SKIN AT LARGEST MEAL   Patient taking differently: Sliding scale - approximately 40 units before evening meal   insulin glargine (LANTUS SOLOSTAR U-100 INSULIN) 100 unit/mL (3 mL) inpn 2020 at Unknown time  No Yes   Si units bid sq  Indications: type 2 diabetes mellitus   Patient taking differently: 75 units in am  Indications: type 2 diabetes mellitus   lisinopril-hydroCHLOROthiazide (PRINZIDE, ZESTORETIC) 20-12.5 mg per tablet 2020 at Unknown time  No Yes   Sig: Take 1 Tab by mouth daily. lutein 20 mg tab 2020  Yes No   Sig: Take 1 tablet by mouth daily. metoprolol tartrate (LOPRESSOR) 25 mg tablet 2020 at 1630  Yes Yes   Sig: Take 12.5 mg by mouth. Take metoprolol 12.5 mg after 4 pm the night before surgery. multivitamins-minerals-lutein (CENTRUM SILVER) Tab 2020  Yes No   Sig: Take 1 tablet by mouth daily. simvastatin (ZOCOR) 40 mg tablet 2020 at Unknown time  No Yes   Sig: Take 1 Tab by mouth nightly. tamsulosin (FLOMAX) 0.4 mg capsule 1/10/2020 at Unknown time  No Yes   Sig: Take 1 Cap by mouth daily. Facility-Administered Medications: None       Review of Systems  Review of systems not obtained due to patient factors.     Past Medical History:   Diagnosis Date    Arthritis     BPH (benign prostatic hyperplasia) 2013    CAD (coronary artery disease)     DM type 2 (diabetes mellitus, type 2) ([facility] [address] dx     Type 2, avg fbs , recognizes hypo at 67, last HA1C was 9.2 on 2014    Dyspnea     at times, Uses Albuterol inhaler when needed (last used first of aug 2014)    Gout 2013    HLD (hyperlipidemia) 2013    HTN (hypertension)     controlled with meds    Morbid obesity ([facility] [address] 9/3/14    BMI 41.7    Psychiatric disorder     anxiety, controlled with buspar    Rheumatic fever     as a child    Seasonal allergic rhinitis     treated with Frances Crew Severe aortic valve stenosis     echo 09/11/19 EF 60-65%- mild to moderate regurgitation    Thyroid disease     hx- no longer takes synthroid    Unspecified sleep apnea 2016    bi pap     Past Surgical History:   Procedure Laterality Date    HX CATARACT REMOVAL Bilateral 2012    HX HEART CATHETERIZATION  12/23/2019    HX ORTHOPAEDIC Left     carpal tunnel    HX TONSIL AND ADENOIDECTOMY       Social History     Socioeconomic History    Marital status:      Spouse name: Not on file    Number of children: Not on file    Years of education: Not on file    Highest education level: Not on file   Occupational History    Not on file   Social Needs    Financial resource strain: Not on file    Food insecurity:     Worry: Not on file     Inability: Not on file    Transportation needs:     Medical: Not on file     Non-medical: Not on file   Tobacco Use    Smoking status: Never Smoker    Smokeless tobacco: Never Used   Substance and Sexual Activity    Alcohol use:  Yes     Alcohol/week: 0.0 standard drinks     Comment: rarely    Drug use: Never    Sexual activity: Not on file   Lifestyle    Physical activity:     Days per week: Not on file     Minutes per session: Not on file    Stress: Not on file   Relationships    Social connections:     Talks on phone: Not on file     Gets together: Not on file     Attends Christianity service: Not on file     Active member of club or organization: Not on file     Attends meetings of clubs or organizations: Not on file     Relationship status: Not on file    Intimate partner violence:     Fear of current or ex partner: Not on file     Emotionally abused: Not on file     Physically abused: Not on file     Forced sexual activity: Not on file   Other Topics Concern    Not on file   Social History Narrative    Not on file     Family History   Problem Relation Age of Onset    Lung Disease Mother         copd    Cancer Father         lympnode cancer    No Known Problems Brother     Cancer Other         fmh lymphoma    Diabetes Other         fmhx    Diabetes Maternal Grandfather      Allergies   Allergen Reactions    Amoxicillin Rash    Keflex [Cephalexin] Rash    Pcn [Penicillins] Other (comments)     \"felt closed in\".   No rash       Current Facility-Administered Medications   Medication Dose Route Frequency    sodium bicarbonate (8.4%) 1 mEq/mL (8.4 %) injection        vasopressin 100 Units in 0.9% sodium chloride 95 mL IVPB   IntraVENous ONCE TITR    0.9% sodium chloride infusion  25 mL/hr IntraVENous CONTINUOUS    dextrose 5% - 0.45% NaCl with KCl 20 mEq/L infusion  25 mL/hr IntraVENous CONTINUOUS    sodium chloride (NS) flush 5-40 mL  5-40 mL IntraVENous Q8H    sodium chloride (NS) flush 5-40 mL  5-40 mL IntraVENous PRN    oxyCODONE-acetaminophen (PERCOCET) 5-325 mg per tablet 1 Tab  1 Tab Oral Q4H PRN    morphine 10 mg/ml injection 3 mg  3 mg IntraVENous Q1H PRN    naloxone (NARCAN) injection 0.4 mg  0.4 mg IntraVENous PRN    mupirocin (BACTROBAN) 2 % ointment   Both Nostrils BID    ceFAZolin (ANCEF) 2 g/20 mL in sterile water IV syringe  2 g IntraVENous Q8H    sodium bicarbonate 8.4 % (1 mEq/mL) injection 50 mEq  50 mEq IntraVENous PRN    EPINEPHrine (ADRENALIN) 4 mg in 0.9% sodium chloride 250 mL infusion  0.05-0.1 mcg/kg/min IntraVENous TITRATE    nitroglycerin (Tridil) 200 mcg/ml infusion   mcg/min IntraVENous TITRATE    PHENYLephrine (LEWIS-SYNEPHRINE) 30 mg in 0.9% sodium chloride 250 mL infusion   mcg/min IntraVENous TITRATE    lidocaine (PF) (XYLOCAINE) 100 mg/5 mL (2 %) injection syringe  mg   mg IntraVENous ONCE PRN    [START ON 1/11/2020] amiodarone (CORDARONE) tablet 200 mg  200 mg Oral Q12H    [START ON 1/11/2020] metoprolol tartrate (LOPRESSOR) tablet 25 mg  25 mg Oral Q12H    atorvastatin (LIPITOR) tablet 80 mg  80 mg Oral QHS    insulin regular (NOVOLIN R, HUMULIN R) 100 Units in 0.9% sodium chloride 100 mL infusion  1 Units/hr IntraVENous TITRATE    dextrose (D50W) injection syrg 12.5 g  25 mL IntraVENous PRN    [START ON 1/11/2020] aspirin chewable tablet 81 mg  81 mg Oral DAILY    magnesium sulfate 1 g/100 ml IVPB (premix or compounded)  1 g IntraVENous PRN    potassium chloride 10 mEq in 50 ml IVPB  10 mEq IntraVENous PRN    midazolam (VERSED) injection 1 mg  1 mg IntraVENous Q1H PRN    propofol (DIPRIVAN) 10 mg/mL infusion  0-50 mcg/kg/min IntraVENous TITRATE    meperidine (DEMEROL) injection 25 mg  25 mg IntraVENous Q1H PRN    chlorhexidine (PERIDEX) 0.12 % mouthwash 10 mL  10 mL Oral BID    tuberculin injection 5 Units  5 Units IntraDERMal ONCE    morphine 10 mg/ml injection 4 mg  4 mg IntraVENous Q1H PRN    Or    morphine 10 mg/ml injection 5 mg  5 mg IntraVENous Q1H PRN    NOREPINephrine (LEVOPHED) 4 mg in 5% dextrose 250 mL infusion  0.05-0.2 mcg/kg/min IntraVENous TITRATE    sodium bicarbonate 8.4 % (1 mEq/mL) injection 50 mEq  50 mEq IntraVENous ONCE    aminocaproic acid (AMICAR) 5 g in 0.9% sodium chloride 250 mL infusion  1 g/hr IntraVENous CONTINUOUS    EPINEPHrine (ADRENALIN) 4 mg in 0.9% sodium chloride 250 mL infusion  1-10 mcg/min IntraVENous TITRATE    heparin 10,000 units in NS 1000 ml irrigation  1,000 mL Irrigation ONCE    insulin regular (NOVOLIN R, HUMULIN R) 100 Units in 0.9% sodium chloride 100 mL infusion  1-10 Units/hr IntraVENous TITRATE         Objective:     Vitals:    01/10/20 0718 01/10/20 1403 01/10/20 1404 01/10/20 1408   BP: 127/59   95/48   Pulse: 71 80 80 80   Resp: 18 9 14 16   Temp:    97.7 °F (36.5 °C)   SpO2: 97% 90% 92% 91%   Weight:       Height:           Intake and Output:   No intake/output data recorded. 01/10 0701 - 01/10 1900  In: 2610 [I.V.:1900]  Out: 12 [Urine:415]    Physical Exam:          Constitutional:  Sedated, orally intubated and mechanically ventilated.   EENMT:  Sclera clear, pupils equal, oral mucosa moist and orally intubated  Respiratory: clear  Cardiovascular:  RRR with no M,G,R;  Gastrointestinal:  soft; no bowel sounds present  Musculoskeletal:  warm with no cyanosis, no lower extremity edema. [place] site  leg with ace wrap. Sedated with no movements. SKIN:  no jaundice or ecchymosis   Neurologic:  sedated but no gross neuro deficits  Psychiatric:  sedated and unable to assess at this time    CXR:        LINES:  ETT, brown, swan sherri, arterial line, chest tubes times 2 in epigastric area without air leak.     DRIPS:  Levophed gtt, Epi gtt ,Insulin gtt    CI:      Ventilator Settings  Mode FIO2 Rate Tidal Volume Pressure PEEP   SIMV, PRVC, Pressure support  100 %    500 ml  10 cm H2O  12 cm H20(Increased after ABG)      Peak airway pressure: 24.6 cm H2O   Minute ventilation: 6.7 l/min     ABG:   Recent Labs     01/10/20  1428 01/10/20  1305 01/10/20  1223   PHI 7.170* 7.241* 7.303*   PCO2I 49.9* 44.4 41.9   PO2I 69* 237* 242*   HCO3I 18.2* 19.0* 20.8*        LAB  Recent Labs     01/08/20  0828   WBC 6.9   HGB 10.9*   HCT 33.4*      INR 0.9     Recent Labs     01/08/20  0828      K 4.4      CO2 28   *   BUN 29*   CREA 1.90*   MG 2.0   CA 9.2   ALB 3.3   SGOT 14*         Assessment and Plan :  (Medical Decision Making)     Hospital Problems  Date Reviewed: 12/6/2019          Codes Class Noted POA    Aortic valve stenosis ICD-10-CM: I35.0  ICD-9-CM: 424.1  1/10/2020 Unknown        CAD (coronary artery disease) ICD-10-CM: I25.10  ICD-9-CM: 414.00  1/10/2020 Unknown        Weaning from respirator ([facility] [address] ICD-10-CM: Z99.11  ICD-9-CM: V46.13  1/10/2020 Unknown        Hypoxemia ICD-10-CM: R09.02  ICD-9-CM: 799.02  1/10/2020 Unknown        S/P CABG x 3 ICD-10-CM: Z95.1  ICD-9-CM: V45.81  1/10/2020 Unknown        S/P AVR ICD-10-CM: Z95.2  ICD-9-CM: V43.3  1/10/2020 Unknown              69  y old morbidly obese s/p AVR and CABG  Came hypoxic , hypotensive and acidotic from OR.9 7.17/50/69     Plan:   - vent adjusted, 1200 % Fi02 now- recheck ABG in 1 hour   - 2 amp of Bicarb  --Bronchodilators per protocol. --Review CXR- pending   - slow weaning, was difficult intubation     More than 50% of the time documented was spent in face-to-face contact with the patient and in the care of the patient on the floor/unit where the patient is located. Thank you for this referral.  We appreciate the opportunity to participate in this patient's care. Will follow along with you.     Ken Srivastava MD

## 2020-01-10 NOTE — PROGRESS NOTES
01/10/20 1823   Oxygen Therapy   O2 Sat (%) 100 %   Pulse via Oximetry 74 beats per minute   FIO2 (%) 60 %  (Weaned after ABG)     Results for Charline Davison (MRN [id number]) as of 1/10/2020 18:26   Ref. Range 1/10/2020 18:21   Exhaled minute volume Latest Units: L/min 8.20   pH (POC) Latest Ref Range: 7.35 - 7.45   7.302 (L)   pCO2 (POC) Latest Ref Range: 35 - 45 MMHG 44.9   pO2 (POC) Latest Ref Range: 75 - 100 MMHG 146 (H)   HCO3 (POC) Latest Ref Range: 22 - 26 MMOL/L 22.2   sO2 (POC) Latest Ref Range: 95 - 98 % 99 (H)   Base deficit (POC) Latest Units: mmol/L 4   FIO2 (POC) Latest Units: % 80   Patient temp.  Latest Units:   98.6   Specimen type (POC) Latest Units:   ARTERIAL   Set Rate Latest Units: bpm 18   Site Latest Units:   DRAWN FROM ARTERIAL LINE   Device: Latest Units:   VENT   Mode Latest Units:   SIMV   Tidal volume Latest Units: ml 500   PEEP/CPAP (POC) Latest Units: cmH2O 12   Pressure support Latest Units: cmH2O 10   Allens test (POC) Latest Units:   NOT APPLICABLE

## 2020-01-10 NOTE — ANESTHESIA PROCEDURE NOTES
Arterial Line Placement    Start time: 1/10/2020 7:37 AM  End time: 1/10/2020 7:41 AM  Performed by: Kiesha Ospina CRNA  Authorized by: Jacobo Cardoso MD     Pre-Procedure  Indications:  Arterial pressure monitoring and blood sampling  Preanesthetic Checklist: patient identified, risks and benefits discussed, anesthesia consent, site marked, patient being monitored, timeout performed and patient being monitored    Timeout Time: 07:37        Procedure:   Prep:  Alcohol and ChloraPrep  Seldinger Technique?: No    Orientation:  Left  Location:  Radial artery  Catheter size:  20 G  Number of attempts:  1  Cont Cardiac Output Sensor: No      Assessment:   Post-procedure:  Line secured and sterile dressing applied  Patient Tolerance:  Patient tolerated the procedure well with no immediate complications

## 2020-01-10 NOTE — PERIOP NOTES
TRANSFER - OUT REPORT:    Verbal report given to [address] on [facility]  being transferred to [facility](unit) for routine progression of care       Report consisted of patients Situation, Background, Assessment and   Recommendations(SBAR). Information from the following report(s) OR Summary was reviewed with the receiving nurse. Lines:   Double Lumen 01/10/20 Right Internal jugular (Active)       Susy Winfield Dual 01/10/20 Right Neck (Active)       Peripheral IV 01/10/20 Right Hand (Active)   Site Assessment Clean, dry, & intact 1/10/2020  6:00 AM   Phlebitis Assessment 0 1/10/2020  6:00 AM   Infiltration Assessment 0 1/10/2020  6:00 AM   Dressing Status Clean, dry, & intact 1/10/2020  6:00 AM   Dressing Type Tape;Transparent 1/10/2020  6:00 AM   Hub Color/Line Status Green; Infusing 1/10/2020  6:00 AM       Arterial Line 01/10/20 Left Radial artery (Active)        Opportunity for questions and clarification was provided.       Patient transported with:   Monitor  O2 @ 15 liters  Patient-specific medications from Pharmacy  Registered Nurse

## 2020-01-11 PROBLEM — E87.5 HYPERKALEMIA: Status: ACTIVE | Noted: 2020-01-01

## 2020-01-11 PROBLEM — N17.9 ACUTE RENAL FAILURE (ARF) (HCC): Status: ACTIVE | Noted: 2020-01-01

## 2020-01-11 PROBLEM — E87.20 METABOLIC ACIDOSIS: Status: ACTIVE | Noted: 2020-01-01

## 2020-01-11 NOTE — PROGRESS NOTES
Ventilator check complete; patient has a #8. 0 ET tube secured at the 23 at the lip. Patient is sedated. Patient is not able to follow commands. Breath sounds are clear. Trachea is midline, Negative for subcutaneous air, and chest excursion is symmetrical. Patient is also Negative for cyanosis and is Negative for pitting edema. All alarms are set and audible. Resuscitation bag and mask are at the head of the bed.       Ventilator Settings  Mode FIO2 Rate Tidal Volume Pressure PEEP I:E Ratio   SIMV, PRVC, Pressure support  60 %(Weaned after ABG)   18 500 ml  10 cm H2O  12 cm H20  1:2.22      Peak airway pressure: [id number]   Minute ventilation: 9.5 l/m         Jeanine Mason

## 2020-01-11 NOTE — PROGRESS NOTES
Ventilator check complete; patient has a #8. 0 ET tube secured at the 23 at the lip. Patient is not sedated. Patient is able to follow commands. Breath sounds are coarse and diminished. Trachea is midline, Negative for subcutaneous air, and chest excursion is symmetric. Patient is also Negative for cyanosis and is Negative for pitting edema. All alarms are set and audible. Resuscitation bag is at the head of the bed. Ventilator Settings  Mode FIO2 Rate Tidal Volume Pressure PEEP I:E Ratio   SIMV, PRVC  40 %    500 ml  10 cm H2O  8 cm H20  1:2.22      Peak airway pressure: 19.5 cm H2O   Minute ventilation: 12.5 l/min     ABG: No results for input(s): PH, PCO2, PO2, HCO3 in the last 72 hours.       Ana Sin, RT

## 2020-01-11 NOTE — PROGRESS NOTES
Progress Note    Patient: Josh Sanchez MRN: [id number]  SSN: [id number]    YOB: 1945  Age: 69 y.o. Sex: male      Admit Date: 1/10/2020    LOS: 1 day    POD 1 s/p AVR & CAB G x 3    Subjective / Interval Events:     Remains intubated. Borderline cardiac function overnight requiring epi. CI and SVO2 much improved on epi, levo, and vaso. ATN upon CRI. Scant UO.  K > 6 overnight, requiring insulin, calcium, bicarb, and kayexelate, with good response. Objective:     Vitals:    01/11/20 0400 01/11/20 0415 01/11/20 0430 01/11/20 0511   BP:       Pulse: 81 80 80    Resp: 17 18 17    Temp:       SpO2: 96% 95% 96%    Weight:    144.7 kg (319 lb 0.1 oz)   Height:            Hemodynamics:  CVP: 17  PA: 43/23  CI: 2.9    Intake and Output:  Current Shift: 01/10 1901 - 01/11 0700  In: -   Out: 730 [Urine:360]  Chest tube drainage in 24 hrs: 700  Last three shifts: 01/09 0701 - 01/10 1900  In: 0043 [I. A.:8393]  Out: 5533 [Urine:1111]  Last 3 Recorded Weights in this Encounter    01/10/20 0537 01/11/20 0511   Weight: 136 kg (299 lb 12.8 oz) 144.7 kg (319 lb 0.1 oz)       Intake/Output Summary (Last 24 hours) at 1/11/2020 0516  Last data filed at 1/11/2020 0300  Gross per 24 hour   Intake 4814.98 ml   Output 2271 ml   Net 2543.98 ml     Date 01/10/20 0700 - 01/11/20 0659 01/11/20 0700 - 01/12/20 0659   Shift 8201-3982 4318-4852 24 Hour Total 3051-0386 3068-4503 24 Hour Total   INTAKE   I.V.(mL/kg/hr) 4105(2.5)  4105        Amicar Volume 500  500        Precedex Volume 39.3  39.3        PitRESSIN Volume 10.3  10.3        Insulin Volume 21  21        Nitroglycerin Volume (ml) 11.9  11.9        Epinephrine Volume 26.3  26.3        Phenylephrine Volume 243.1  243.1        Levophed Volume 226.9  226.9        Volume (lactated Ringers infusion) 600  600        Volume (lactated Ringers infusion) 50  50        Volume (0.9% sodium chloride infusion) 750  750        Volume (0.9% sodium chloride infusion) 95.8  95.8        Volume (dextrose 5 % - 0.45% NaCl infusion) 30.4  30.4        Volume (lactated Ringers infusion) 500  500        Volume (albumin human 5% (BUMINATE) solution 25 g) 500  500        Volume (albumin human 5% (BUMINATE) solution 25 g) 500  500      Blood 710  710        Autotransfused 400  400        Volume (Packed RBC's) 310  310      Shift Total(mL/kg) 4815(35.4)  3866(16.8)      OUTPUT   Urine(mL/kg/hr) 1111(0.7) 360 1471        Urine Output 415  415        Urine Output (mL) (Urinary Catheter 01/10/20 2- way; Turner - Temperature)       Emesis/NG output 100  100        Output (ml) (Orogastric Tube 01/10/20) 100  100      Drains 0 0 0        Output (ml) (Vacuum Assisted Closure Mid Sternum) 0 0 0        Output (ml) (Vacuum Assisted Closure Left;Lateral Thigh) 0 0 0        Output (ml) (Vacuum Assisted Closure Left;Lateral Pretibial) 0 0 0      Chest Tube 330 370 700        Output (ml) (Chest Tube #1 01/10/20 Straight;Left;Pleural) 330 370 700      Shift Total(mL/kg) 1541(11.3) 730(5) 2271(15.7)      NET 3275 -912 254      Weight (kg) 136 144.7 144.7 144.7 144.7 144. 7         Physical Exam:   Neuro:  Intubated, asleep. Neck:  + JVD  Chest:  Incision clean; sternum intact  Cor:  rrr without murmur, rub, or gallop. Lungs:  Clear to auscultation  Ext:  Incisions clean. Min edema.     Lab/Data Review:  BMP:   Lab Results   Component Value Date/Time     01/11/2020 02:56 AM    K 4.9 01/11/2020 02:56 AM     (H) 01/11/2020 02:56 AM    CO2 23 01/11/2020 02:56 AM    AGAP 6 (L) 01/11/2020 02:56 AM     (H) 01/11/2020 02:56 AM    BUN 35 (H) 01/11/2020 02:56 AM    CREA 2.67 (H) 01/11/2020 02:56 AM    GFRAA 30 (L) 01/11/2020 02:56 AM    GFRNA 25 (L) 01/11/2020 02:56 AM     CMP:   Lab Results   Component Value Date/Time     01/11/2020 02:56 AM    K 4.9 01/11/2020 02:56 AM     (H) 01/11/2020 02:56 AM    CO2 23 01/11/2020 02:56 AM    AGAP 6 (L) 01/11/2020 02:56 AM     (H) 01/11/2020 02:56 AM    BUN 35 (H) 01/11/2020 02:56 AM    CREA 2.67 (H) 01/11/2020 02:56 AM    GFRAA 30 (L) 01/11/2020 02:56 AM    GFRNA 25 (L) 01/11/2020 02:56 AM    CA 8.1 (L) 01/11/2020 02:56 AM    MG 2.3 01/11/2020 02:56 AM     CBC:   Lab Results   Component Value Date/Time    WBC 10.1 01/11/2020 02:56 AM    HGB 8.3 (L) 01/11/2020 02:56 AM    HCT 25.4 (L) 01/11/2020 02:56 AM     (L) 01/11/2020 02:56 AM     All Cardiac Markers in the last 24 hours: No results found for: CPK, CK, CKMMB, CKMB, RCK3, CKMBT, CKNDX, CKND1, POOJA, TROPT, TROIQ, CHUCKY, TROPT, TNIPOC, BNP, BNPP  Recent Glucose Results:   Lab Results   Component Value Date/Time     (H) 01/11/2020 02:56 AM     (H) 01/10/2020 09:41 PM     (H) 01/10/2020 02:29 PM     ABG:   Lab Results   Component Value Date/Time    PHI 7.313 (L) 01/11/2020 03:04 AM    PCO2I 39.9 01/11/2020 03:04 AM    PO2I 68 (L) 01/11/2020 03:04 AM    HCO3I 20.3 (L) 01/11/2020 03:04 AM    FIO2I 50 01/11/2020 03:10 AM     COAGS:   Lab Results   Component Value Date/Time    APTT 34.2 01/10/2020 02:29 PM    PTP 19.1 (H) 01/10/2020 02:29 PM    INR 1.6 01/10/2020 02:29 PM     Liver Panel: No results found for: ALB, CBIL, TBIL, TP, GLOB, AGRAT, SGOT, ASTPOC, ALTPOC, ALT, GPT, AP       Assessment:     The patient is presently POD #1 from a AVR & CABG x 3. Overall, patient is critically ill, requiring multiple pressors. Remains on vent with increased A-a gradient. Scant UO. Major issues: As above. Principal Problem:    CAD (coronary artery disease) (1/10/2020)    Active Problems:     Aortic valve stenosis (1/10/2020)      Weaning from respirator ([address] (1/10/2020)      Hypoxemia (1/10/2020)      S/P CABG x 3 (1/10/2020)      S/P AVR (0/09/1748)      Metabolic acidosis (5/44/2336)      Acute renal failure (ARF) ([address] (1/11/2020)      Hyperkalemia (1/11/2020)        Plan:     Critically ill s/p CABG & AVR:    Cardiac: On epi, levo, vaso; wean as tolerated for a MAP > 70 for renal perfusion. Respiratory: To remain on vent until more hemodynamically stable and Aa gradient improved. Management per pulmonary. Renal: Acute upon chronic RF. Renal consultation. Start lasix drip at 20 mg/h for target -200 cc/h for now. Further management per renal.    GI: NGT to suction. Neuro:  Sedated; moves everything. Wires: In.  Given hyperkalemia, keep PM at VVI @ 50 as backup. Tubes: In.    Disposition:  CVICU.         Signed By: Gissel Whitten MD     January 11, 2020

## 2020-01-11 NOTE — CONSULTS
[facility]        Subjective: A on CKD3   Renal consult dictated # [id number]    Review of Systems -   General ROS: negative for - fever, chills  Respiratory ROS: no SOB, cough, ESTRELLA  Cardiovascular ROS: no CP, palpitations  Gastrointestinal ROS: no N&V, abdominal pain, diarrhea  Genito-Urinary ROS: no difficulty voiding, dysuria  Neurological ROS: no seizures, focal weekness        Objective:    Vitals:    01/11/20 0800 01/11/20 0815 01/11/20 0830 01/11/20 0836   BP:       Pulse: 78 76 77 77   Resp: (!) 32 16 13 16   Temp: 99.7 °F (37.6 °C)      SpO2: 95% 96% 96% 95%   Weight:       Height:           PE  Gen: intubated, multiple pressors. Awakens and answers questions appropriatly. CV:reg rate  Chest:bilat breath sounds  Abd: soft  Ext/Access: no edema       . LAB  Recent Labs     01/11/20  0256 01/10/20  2141 01/10/20  1815 01/10/20  1429   WBC 10.1  --   --  16.9*   HGB 8.3* 8.9* 8.9* 9.0*   HCT 25.4* 27.5* 27.8* 28.5*   *  --   --  98*   INR  --   --   --  1.6     Recent Labs     01/11/20  0256 01/10/20  2141 01/10/20  1815 01/10/20  1429    141  --  142   K 4.9 5.8* 4.8 5.0   * 112*  --  114*   CO2 23 22  --  22   * 151*  --  117*   BUN 35* 32*  --  29*   CREA 2.67* 2.51*  --  2.23*   MG 2.3 2.5* 2.6* 2.6*   CA 8.1* 8.1*  --  8.0*           Radiology    A/P:   Patient Active Problem List   Diagnosis Code    DM type 2 (diabetes mellitus, type 2) (HCC) E11.9    Gout M10.9    HTN (hypertension) I10    BPH (benign prostatic hyperplasia) N40.0    Seasonal allergic rhinitis J30.2    HLD (hyperlipidemia) E78.5    Nonrheumatic aortic valve stenosis I35.0    Obesity, morbid (HCC) E66.01    Type 2 diabetes with nephropathy ([facility]) E11.21    Bilateral carotid artery stenosis I65.23    Aortic valve stenosis I35.0    CAD (coronary artery disease) I25.10    Weaning from respirator ([facility]) Z99.11    Hypoxemia R09.02    S/P CABG x 3 Z95.1    S/P AVR I13.8    Metabolic acidosis E87.2    Acute renal failure (ARF) (HCC) N17.9    Hyperkalemia E87.5     A on CKD - the acute component may be hemodynamic vs ATN. Not clear if his CKD is due to DM, HTN or stone disease. Will check renal US in view of hx of stones. Will watch labs. He may need dialysis if no improvement.     ASHD/ AS - s/p CABG  Aortic Valve replacement    DM    HTN/ Volume        Lisbeth Benjamin MD

## 2020-01-11 NOTE — PROGRESS NOTES
BSR given to incoming RN Shashank Kemp. Updated on current status, drips, events of the night.  Need for nephrology consult this am and repeat labs at 10 am.

## 2020-01-11 NOTE — PROGRESS NOTES
Dr. Angelo Garcia called. Made aware of BMP results. Updated on Hemodynamics and Urine output decreased last few hours. Orders received. Amp of Bicarb given as ordered. Epi drip started and beginning to wean Levophed down. Resp therapist called for ABG and ionized calcium, potassium via ABG over 6. Results called to Dr Angelo Garcia and pharmacy notified need for 1 gm of calcium. Dr. Bre Munoz at bedside. Pt very agitated. Fentanyl drip ordered and kayexalate ordered. Back up rate for pacer increased to 50 as per Dr. Angelo Garcia.

## 2020-01-11 NOTE — PROGRESS NOTES
cxr noted with small Right sided PTX. Hg lower as well. Cr is worse at 2.67. Getting renal to see in AM  Will have to f/u x-ray in afternoon to see if worsening Right sided PTX  Rik Rivera MD        LABS    Recent Labs     01/11/20  0256 01/10/20  2141 01/10/20  1815 01/10/20  1429 01/08/20  0828   WBC 10.1  --   --  16.9* 6.9   HGB 8.3* 8.9* 8.9* 9.0* 10.9*   HCT 25.4* 27.5* 27.8* 28.5* 33.4*   *  --   --  98* 204     Recent Labs     01/11/20  0256 01/10/20  2141 01/10/20  1815 01/10/20  1429 01/08/20  0828    141  --  142 139   K 4.9 5.8* 4.8 5.0 4.4   * 112*  --  114* 106   * 151*  --  117* 230*   CO2 23 22  --  22 28   BUN 35* 32*  --  29* 29*   CREA 2.67* 2.51*  --  2.23* 1.90*   MG 2.3 2.5* 2.6* 2.6* 2.0   INR  --   --   --  1.6 0.9     Recent Labs     01/11/20  0304 01/11/20  0036 01/10/20  2203   PHI 7.313* 7.370 7.321*   PCO2I 39.9 35.5 40.3   PO2I 68* 85 94   HCO3I 20.3* 20.6* 20.8*     No results for input(s): LCAD, LAC in the last 72 hours.

## 2020-01-11 NOTE — CONSULTS
[address] [place] [address]    Name:  Castillo Fontaine  MR#:  [id number]  :  1945  ACCOUNT #:  [de-identified]  DATE OF SERVICE:  2020      NEPHROLOGY CONSULTATION    REASON FOR CONSULTATION:  We are seeing the patient at the request of Dr. Marco Sargent with regards to acute-on-chronic kidney disease. HISTORY OF PRESENT ILLNESS:  The patient is a 79-year-old male who had been followed by Dr. Prashant Stanley for cardiac issues with exertional dyspnea and chest pressure. Evaluation showed progressive aortic stenosis and multivessel coronary artery disease. The patient underwent coronary artery bypass grafting yesterday as well as aortic valve replacement. Postoperatively, he has had a drop in his urine function. He has remained intubated with some vascular congestion and is on multiple pressors. The patient is unaware of any chronic kidney disease. Review of lab work though shows that he has had abnormal renal function dating back to at least  when his creatinine was 1.40. His creatinine has ranged in the 1.4-2 range with a GFR of 30-40 up through 2020. Yesterday, his BUN was 29, creatinine was 2.23. He has had some problems with some hyperkalemia and has been treated medically for that and had peaked at 5.8. This morning, he has a sodium 142, potassium 4.9, chloride 113, CO2 is 23 with an anion gap of 6, blood sugar 142, BUN is 35, creatinine is 2.67. His urine output last shift was only about 80 mL. A urinalysis done on  showed a urine specific gravity of 1.020 with no protein being present. A CT angiogram done on 2019 showed no occlusion of the renal arteries, unremarkable kidneys other than for some nonobstructing stones. The patient is currently in Cardiovascular ICU. He is alert but still intubated and on multiple pressors.     PAST MEDICAL HISTORY:  Significant for benign prostatic hypertrophy, type 2 diabetes mellitus, gout, hyperlipidemia, hypertension, morbid obesity, seasonal allergies, obstructive sleep apnea. ALLERGIES:  HE HAD A HISTORY OF DRUG ALLERGIES TO PENICILLIN AND KEFLEX WHICH CAUSES A RASH. CURRENT MEDICATIONS:  1. Cordarone 200 mg p.o. q.12 hours. 2.  Aspirin 81 mg p.o. daily. 3.  Lipitor 80 mg p.o. at bedtime. 4.  Pepcid 20 mg IV daily. 5.  Lopressor 25 mg p.o. daily. 6.  He is currently on multiple drips including epinephrine, Levophed, fentanyl, vasopressin, and nitroglycerin. He is also on a Lasix drip. FAMILY HISTORY:  Positive for diabetes, lymphoma, and chronic lung disease. Negative for any kidney disease. SOCIAL HISTORY:  He is . Never smoked. Does drink alcohol on occasion. REVIEW OF SYSTEMS:  The patient indicates he has no shortness of breath. No chest pain. No nausea, vomiting, heartburn, or abdominal pain. PHYSICAL EXAMINATION:  GENERAL:  An elderly white male who is intubated but awakens easily and answers questions appropriately. He is intubated on a ventilator on multiple pressors. HEENT:  His head is normocephalic. Eyes:  Pupils are equal and react to light and accommodation. Extraocular muscles are intact. Fundi were not visualized. LUNGS:  He has bilateral breath sounds. HEART:  Regular rate and rhythm. ABDOMEN:  Soft. Bowel sounds are present. GENITAL/RECTAL:  Exam is deferred. EXTREMITIES:  He has no peripheral edema. IMPRESSION:  1. Acute-on-chronic kidney disease. It is not clear how much of this could be hemodynamic versus some possible acute tubular necrosis. 2.  He has underlying chronic kidney disease. The etiology of that is not clear yet. I do not know how much of this may be due to diabetic nephrosclerosis versus hypertensive nephrosclerosis versus damage from chronic stone disease. 3.  Atherosclerotic coronary artery disease and aortic stenosis status post coronary artery bypass grafting and aortic valve replacement. 4.  Type 2 diabetes mellitus. 5.  Hypertension.   6. Obstructive sleep apnea. 7.  History of gout. PLAN:  We will check a renal ultrasound, and given a history of kidney stones, I will watch serial labs. Certainly, if his urine output remains limited, potassium becomes a problem. When he remains volume overloaded, we may need to institute dialytic support. Thank you very much for allowing us to see him and helping in his care. We will follow closely with you.       Billy Lara MD      [id number]  D:  01/11/2020 8:52  T:  01/11/2020 12:33  JOB #:  [id number]  CC:  MD Melissa Bunch MD Genevive Headings, MD

## 2020-01-11 NOTE — PROGRESS NOTES
Patient had episode of severe agitation. I mg versed given per emr.  Pt's BP and svO2 improved with calm state

## 2020-01-11 NOTE — PROGRESS NOTES
Dr. Ambrose Santana into see pt. Aware of labs this am and chest Xray done, all drip rates and present hemodynamics. Lasix drip ordered. Consult for nephrology ordered.

## 2020-01-11 NOTE — PROGRESS NOTES
Dual skin assessment performed with second RN. All areas of skin inspected. Allevyn in place over sacrum. Allevyn pulled back and sacrum inspected. No redness or breakdown observed.

## 2020-01-11 NOTE — PROGRESS NOTES
Cardiovascular ICU Progress Note: 1/11/2020  Rosaura Macario  Admission Date: 1/10/2020     Patient had CABG x 3 and AVR . The patient's chart is reviewed and the patient is discussed with the staff. Subjective:     Patient still on vent and tolerating but very agitated. In restraints, mouthing he knows he's not ready to come off vent. Mouth is dry, but then banging his hands on rale. Review of Systems  Review of systems not obtained due to patient factors. Allergies   Allergen Reactions    Amoxicillin Rash    Keflex [Cephalexin] Rash    Pcn [Penicillins] Other (comments)     \"felt closed in\".   No rash       Current Facility-Administered Medications   Medication Dose Route Frequency    0.9% sodium chloride infusion  25 mL/hr IntraVENous CONTINUOUS    dextrose 5% - 0.45% NaCl with KCl 20 mEq/L infusion  25 mL/hr IntraVENous CONTINUOUS    sodium chloride (NS) flush 5-40 mL  5-40 mL IntraVENous Q8H    sodium chloride (NS) flush 5-40 mL  5-40 mL IntraVENous PRN    oxyCODONE-acetaminophen (PERCOCET) 5-325 mg per tablet 1 Tab  1 Tab Oral Q4H PRN    morphine 10 mg/ml injection 3 mg  3 mg IntraVENous Q1H PRN    naloxone (NARCAN) injection 0.4 mg  0.4 mg IntraVENous PRN    mupirocin (BACTROBAN) 2 % ointment   Both Nostrils BID    ceFAZolin (ANCEF) 2 g/20 mL in sterile water IV syringe  2 g IntraVENous Q8H    EPINEPHrine (ADRENALIN) 4 mg in 0.9% sodium chloride 250 mL infusion  0.05-0.1 mcg/kg/min IntraVENous TITRATE    nitroglycerin (Tridil) 200 mcg/ml infusion   mcg/min IntraVENous TITRATE    lidocaine (PF) (XYLOCAINE) 100 mg/5 mL (2 %) injection syringe  mg   mg IntraVENous ONCE PRN    amiodarone (CORDARONE) tablet 200 mg  200 mg Oral Q12H    metoprolol tartrate (LOPRESSOR) tablet 25 mg  25 mg Oral Q12H    atorvastatin (LIPITOR) tablet 80 mg  80 mg Oral QHS    insulin regular (NOVOLIN R, HUMULIN R) 100 Units in 0.9% sodium chloride 100 mL infusion  1 Units/hr IntraVENous TITRATE    dextrose (D50W) injection syrg 12.5 g  25 mL IntraVENous PRN    aspirin chewable tablet 81 mg  81 mg Oral DAILY    magnesium sulfate 1 g/100 ml IVPB (premix or compounded)  1 g IntraVENous PRN    potassium chloride 10 mEq in 50 ml IVPB  10 mEq IntraVENous PRN    midazolam (VERSED) injection 1 mg  1 mg IntraVENous Q1H PRN    propofol (DIPRIVAN) 10 mg/mL infusion  0-50 mcg/kg/min IntraVENous TITRATE    meperidine (DEMEROL) injection 25 mg  25 mg IntraVENous Q1H PRN    chlorhexidine (PERIDEX) 0.12 % mouthwash 10 mL  10 mL Oral BID    tuberculin injection 5 Units  5 Units IntraDERMal ONCE    morphine 10 mg/ml injection 4 mg  4 mg IntraVENous Q1H PRN    Or    morphine 10 mg/ml injection 5 mg  5 mg IntraVENous Q1H PRN    NOREPINephrine (LEVOPHED) 4 mg in 5% dextrose 250 mL infusion  0.05-0.2 mcg/kg/min IntraVENous TITRATE    sodium bicarbonate 8.4 % (1 mEq/mL) injection 50 mEq  50 mEq IntraVENous ONCE    dexmedeTOMidine (PRECEDEX) 400 mcg in 0.9% sodium chloride 100 mL infusion  0.2-0.7 mcg/kg/hr IntraVENous TITRATE    dextrose 5 % - 0.45% NaCl infusion  25 mL/hr IntraVENous CONTINUOUS    vasopressin (VASOSTRICT) 20 Units in 0.9% sodium chloride 100 mL infusion  0-0.1 Units/min IntraVENous TITRATE    PHENYLephrine (PF)(LEWIS-SYNEPHRINE) 30 mg in 0.9% sodium chloride 250 mL infusion   mcg/min IntraVENous TITRATE    sodium bicarbonate (8.4%) injection 50 mEq  50 mEq IntraVENous PRN    famotidine (PF) (PEPCID) 20 mg in 0.9% sodium chloride 10 mL injection  20 mg IntraVENous DAILY    famotidine (PF) (PEPCID) 20 mg/2 mL injection        aminocaproic acid (AMICAR) 5 g in 0.9% sodium chloride 250 mL infusion  1 g/hr IntraVENous CONTINUOUS    EPINEPHrine (ADRENALIN) 4 mg in 0.9% sodium chloride 250 mL infusion  1-10 mcg/min IntraVENous TITRATE    insulin regular (NOVOLIN R, HUMULIN R) 100 Units in 0.9% sodium chloride 100 mL infusion  1-10 Units/hr IntraVENous TITRATE Objective:     Vitals:    01/10/20 2330 01/10/20 2345 01/11/20 0002 01/11/20 0015   BP:       Pulse: 69 70 70 70   Resp: 19 (!) 0 19 18   Temp:       SpO2: 98% 98% 98% 99%   Weight:       Height:           Intake and Output:   01/09 0701 - 01/10 1900  In: 3502 [I. Y.:0484]  Out: 1523 [Urine:1111]  No intake/output data recorded. Physical Exam:          Constitutional:  Sedated, orally intubated and mechanically ventilated. EENMT:  Sclera clear, pupils equal, oral mucosa moist and orally intubated  Respiratory: clear b/l  Cardiovascular:  RRR with no M,G,R;  Gastrointestinal:  soft; no bowel sounds present  Musculoskeletal:  warm with no cyanosis, no lower extremity edema. [place] site  leg with ace wrap. Moving  SKIN:  no jaundice or ecchymosis   Neurologic:   no gross neuro deficits  Psychiatric:  Agitated and in restraints. CXR:  Pending. LINES:  ETT, brown, swan sherri, arterial line, chest tubes times 2 in epigastric area without air leak.     DRIPS:  Levophed gtt, Vaso, precedex, Insulin    CI:  1.9-2.1    Ventilator Settings  Mode FIO2 Rate Tidal Volume Pressure PEEP   SIMV, PRVC  40 %    500 ml  10 cm H2O  8 cm H20      Peak airway pressure: 23 cm H2O   Minute ventilation: 9.2 l/min     ABG:   Recent Labs     01/10/20  2203 01/10/20  1821 01/10/20  1619   PHI 7.321* 7.302* 7.263*   PCO2I 40.3 44.9 45.9*   PO2I 94 146* 128*   HCO3I 20.8* 22.2 20.8*        LAB  Recent Labs     01/10/20  2141 01/10/20  1815 01/10/20  1429 01/08/20  0828   WBC  --   --  16.9* 6.9   HGB 8.9* 8.9* 9.0* 10.9*   HCT 27.5* 27.8* 28.5* 33.4*   PLT  --   --  98* 204   INR  --   --  1.6 0.9     Recent Labs     01/10/20  2141 01/10/20  1815 01/10/20  1429 01/08/20  0828     --  142 139   K 5.8* 4.8 5.0 4.4   *  --  114* 106   CO2 22  --  22 28   *  --  117* 230*   BUN 32*  --  29* 29*   CREA 2.51*  --  2.23* 1.90*   MG 2.5* 2.6* 2.6* 2.0   CA 8.1*  --  8.0* 9.2   ALB  --   --   --  3.3   SGOT  --   -- --  14*         Assessment and Plan :  (Medical Decision Making)     Hospital Problems  Date Reviewed: 12/6/2019          Codes Class Noted POA    Aortic valve stenosis ICD-10-CM: I35.0  ICD-9-CM: 424.1  1/10/2020 Unknown        * (Principal) CAD (coronary artery disease) ICD-10-CM: I25.10  ICD-9-CM: 414.00  1/10/2020 Unknown        S/P CABG x 3 ICD-10-CM: Z95.1  ICD-9-CM: V45.81  1/10/2020 Unknown        S/P AVR ICD-10-CM: Z95.2  ICD-9-CM: V43.3  1/10/2020 Unknown        Weaning from respirator [facility]) ICD-10-CM: Z99.11  ICD-9-CM: V46.13  1/10/2020 Unknown        Metabolic acidosis JSA-10-QA: E87.2  ICD-9-CM: 276.2  1/11/2020 Unknown        Acute renal failure (ARF) (HCC) ICD-10-CM: N17.9  ICD-9-CM: 584.9  1/11/2020 Unknown        Hyperkalemia ICD-10-CM: E87.5  ICD-9-CM: 276.7  1/11/2020 Unknown        Hypoxemia ICD-10-CM: R09.02  ICD-9-CM: 799.02  1/10/2020 Unknown              69  y old morbidly obese s/p AVR and CABG x3. Still hypotensive and acidotic. Cr is worsening. Plan:   - get renal in AM to see patient given elevated k+ and decreased urine output  - PMD addressing elevated k+ --> getting bicarb, will give albuterol treatment as well. K+ on ABG now is 6.1 and will give him kxylate  --Bronchodilators per protocol. --f/u x-ray and labs in AM  --wean slowly -- not ready at this time  --quite agitated and at risk for hurting self on precedex but still agitated will add Fentanyl drip - told staff to tell PMD.   --continue remaining treatment per PMD.      More than 50% of the time documented was spent in face-to-face contact with the patient and in the care of the patient on the floor/unit where the patient is located.               David Harris MD

## 2020-01-12 NOTE — DIALYSIS
72 HR SLED initiated using  Left CVC. Aspirated and flushed both catheter ports without problem. Machine settings per MD order. 150  DFR  100 UFR  Heparin 0 unit bolus and 500 ml/hr. Reported to primary nurse. Dialysis nurse available as needed.

## 2020-01-12 NOTE — PROGRESS NOTES
Pharmacokinetic Consult to Pharmacist    Priya Guevara is a 69 y.o. male being treated for sepsis with aztreonam, metronidazole, and vancomycin. Height: 5' 10\" (177.8 cm)  Weight: 146.3 kg (322 lb 8.5 oz)  Lab Results   Component Value Date/Time    BUN 43 (H) 01/12/2020 01:12 AM    Creatinine 3.51 (H) 01/12/2020 01:12 AM    WBC 21.3 (H) 01/12/2020 01:12 AM    Procalcitonin 4.23 01/12/2020 01:53 AM      Estimated Creatinine Clearance: 27.1 mL/min (A) (based on SCr of 3.51 mg/dL (H)). CULTURES:  Cultures not yet obtained on this admission. Day 1 of vancomycin. Goal trough is 15-20. Vancomycin dose initiated at 2500 mg x1. Will dose intermittently 2/2 CRRT and obtain levels when clinically indicated.       Thank you,    Jose F Berumen, PharmD, BCPS  Clinical Pharmacist  [phone]

## 2020-01-12 NOTE — PROGRESS NOTES
Dr Ambrose Santana at bedside for placement of trialysis catheter. CXR obtained immediately post procedure. Read by Dr Ambrose Santana for verification of line placement and absence of pneumothorax.

## 2020-01-12 NOTE — PROGRESS NOTES
Dr. Darius Moeller updated regarding patient's cardiac rhythm change to atrial fib with RVR. Orders received.

## 2020-01-12 NOTE — PROGRESS NOTES
Progress Note    Patient: Carrillo Arias MRN: [id number]  SSN: [id number]    YOB: 1945  Age: 69 y.o. Sex: male      Admit Date: 1/10/2020    LOS: 2 days    POD 2 s/p AVR & CAB G x 3    Subjective / Interval Events:     Remains intubated. CI and SVO2 much improved on epi, levo, and vaso. Still very vasoplegic, requiring pressor support. ATN upon CRI. Scant UO. Poor response to lasix (received around 600 mg lasix in last 24h). K > 6 overnight, requiring insulin, calcium, bicarb, and kayexelate, with mediocre response.       Objective:     Vitals:    01/12/20 0415 01/12/20 0430 01/12/20 0445 01/12/20 0500   BP:       Pulse: 83 83 83 83   Resp: 25 25 (!) 33 25   Temp:    (!) 100.5 °F (38.1 °C)   SpO2: 98% 98% 97% 97%   Weight:       Height:            Hemodynamics:  CVP: 23  PA: 50/27  CI: 2.5    Intake and Output:  Current Shift: 01/11 1901 - 01/12 0700  In: 1010.4 [I.V.:1010.4]  Out: 790 [Urine:660]  Chest tube drainage in 24 hrs: 700  Last three shifts: 01/10 0701 - 01/11 1900  In: 8329.6 [I.V.:7429.6]  Out: 2764 [Urine:2071]  Last 3 Recorded Weights in this Encounter    01/10/20 0537 01/11/20 0511   Weight: 136 kg (299 lb 12.8 oz) 144.7 kg (319 lb 0.1 oz)       Intake/Output Summary (Last 24 hours) at 1/12/2020 0552  Last data filed at 1/12/2020 0500  Gross per 24 hour   Intake 4495.27 ml   Output 2125 ml   Net 2370.27 ml     Date 01/11/20 0700 - 01/12/20 0659 01/12/20 0700 - 01/13/20 0659   Shift 2906-9418 4568-7652 24 Hour Total 1904-1421 4334-2273 24 Hour Total   INTAKE   I.V.(mL/kg/hr) 1150.9(0.7) 1010.4 2161.3        Precedex Volume 100  100        PitRESSIN Volume 57.7  57.7        Fentanyl Volume 27  27        Lasix Volume 180.3  180.3        Insulin Volume 27  27        Epinephrine Volume 162.9  162.9        Levophed Volume 158.5 1010.4 1168.9        Volume (0.9% sodium chloride infusion) 218.8  218.8        Volume (dextrose 5 % - 0.45% NaCl infusion) 218.8  218.8 NG/  190        Water Flush Volume (mL) (Orogastric Tube 01/10/20) 70  70        Intake (ml) (Orogastric Tube 01/10/20) 120  120      Shift Total(mL/kg) 1340.9(9.3) 1010.4(7) 2351. 3(16.2)      OUTPUT   Urine(mL/kg/hr) 485(0.3) 660 1145        Urine Output (mL) (Urinary Catheter 01/10/20 2- way; Turner - Temperature)       Emesis/NG output 400  400        Output (ml) (Orogastric Tube 01/10/20) 400  400      Drains 0 0 0        Output (ml) (Vacuum Assisted Closure Mid Sternum) 0  0        Output (ml) (Vacuum Assisted Closure Left;Lateral Thigh) 0 0 0        Output (ml) (Vacuum Assisted Closure Left;Lateral Pretibial) 0 0 0      Chest Tube 360 130 490        Output (ml) (Chest Tube #1 01/10/20 Straight;Left;Pleural) 360 130 490      Shift Total(mL/kg) 1245(8.6) 790(5.5) 9587(02.2)      NET 95.9 220.4 316.3      Weight (kg) 144.7 144.7 144.7 144.7 144.7 144. 7         Physical Exam:   Neuro:  Intubated, asleep. Neck:  + JVD  Chest:  Incision clean; sternum intact  Cor:  rrr without murmur, rub, or gallop. Lungs:  Clear to auscultation  Ext:  Incisions clean. Min edema.     Lab/Data Review:  BMP:   Lab Results   Component Value Date/Time     01/12/2020 01:12 AM    K 6.5 (HH) 01/12/2020 01:12 AM     (H) 01/12/2020 01:12 AM    CO2 23 01/12/2020 01:12 AM    AGAP 6 (L) 01/12/2020 01:12 AM     (H) 01/12/2020 01:12 AM    BUN 43 (H) 01/12/2020 01:12 AM    CREA 3.51 (H) 01/12/2020 01:12 AM    GFRAA 22 (L) 01/12/2020 01:12 AM    GFRNA 18 (L) 01/12/2020 01:12 AM     CMP:   Lab Results   Component Value Date/Time     01/12/2020 01:12 AM    K 6.5 (HH) 01/12/2020 01:12 AM     (H) 01/12/2020 01:12 AM    CO2 23 01/12/2020 01:12 AM    AGAP 6 (L) 01/12/2020 01:12 AM     (H) 01/12/2020 01:12 AM    BUN 43 (H) 01/12/2020 01:12 AM    CREA 3.51 (H) 01/12/2020 01:12 AM    GFRAA 22 (L) 01/12/2020 01:12 AM    GFRNA 18 (L) 01/12/2020 01:12 AM    CA 7.8 (L) 01/12/2020 01:12 AM    MG 2.6 (H) 01/11/2020 12:55 PM    ALB 3.1 (L) 01/12/2020 01:12 AM    TP 5.9 (L) 01/12/2020 01:12 AM    GLOB 2.8 01/12/2020 01:12 AM    AGRAT 1.1 (L) 01/12/2020 01:12 AM    SGOT 64 (H) 01/12/2020 01:12 AM    ALT 10 (L) 01/12/2020 01:12 AM     CBC:   Lab Results   Component Value Date/Time    WBC 21.3 (H) 01/12/2020 01:12 AM    HGB 8.9 (L) 01/12/2020 01:12 AM    HCT 28.2 (L) 01/12/2020 01:12 AM     (L) 01/12/2020 01:12 AM     All Cardiac Markers in the last 24 hours: No results found for: CPK, CK, CKMMB, CKMB, RCK3, CKMBT, CKNDX, CKND1, POOJA, TROPT, TROIQ, CHUCKY, TROPT, TNIPOC, BNP, BNPP  Recent Glucose Results:   Lab Results   Component Value Date/Time     (H) 01/12/2020 01:12 AM     (H) 01/11/2020 12:55 PM     (H) 01/11/2020 10:15 AM     ABG:   Lab Results   Component Value Date/Time    PHI 7.368 01/12/2020 02:00 AM    PCO2I 37.5 01/12/2020 02:00 AM    PO2I 77 01/12/2020 02:00 AM    HCO3I 21.6 (L) 01/12/2020 02:00 AM    FIO2I 60 01/12/2020 03:10 AM     COAGS:   Lab Results   Component Value Date/Time    APTT 32.9 01/12/2020 01:12 AM    PTP 17.3 (H) 01/12/2020 01:12 AM    INR 1.4 01/12/2020 01:12 AM     Liver Panel:   Lab Results   Component Value Date/Time    ALB 3.1 (L) 01/12/2020 01:12 AM    TP 5.9 (L) 01/12/2020 01:12 AM    GLOB 2.8 01/12/2020 01:12 AM    AGRAT 1.1 (L) 01/12/2020 01:12 AM    SGOT 64 (H) 01/12/2020 01:12 AM    ALT 10 (L) 01/12/2020 01:12 AM    AP 51 01/12/2020 01:12 AM          Assessment:     The patient is presently POD #2 from a AVR & CABG x 3. Overall, patient is critically ill, requiring multiple pressors. Remains on vent with increased A-a gradient. Scant UO. Major issues: As above. Principal Problem:    CAD (coronary artery disease) (1/10/2020)    Active Problems:     Aortic valve stenosis (1/10/2020)      Weaning from respirator ([address] (1/10/2020)      Hypoxemia (1/10/2020)      S/P CABG x 3 (1/10/2020)      S/P AVR (4/66/8760)      Metabolic acidosis (7/62/3669) Acute renal failure (ARF) ([facility] [address] (1/11/2020)      Hyperkalemia (1/11/2020)        Plan:     Critically ill s/p CABG & AVR:    Cardiac: On epi, levo, vaso; wean as tolerated for a MAP > 70 for renal perfusion. Respiratory: To remain on vent until more hemodynamically stable and Aa gradient improved. Management per pulmonary. Renal: Acute upon chronic RF. Renal consultation. Poor response to lasix. I placed a trialysis catheter this am in the left subclavian. Good venous return noted. To discuss starting CRRTD with renal.    GI: NGT. No feeds yet. Neuro:  Sedated; moves everything. Wires: In.  Because of hyperkalemia, has required pacing VVI at 80. Underlying junctional escape at 40. Tubes: In.    Disposition:  CVICU.         Signed By: Shavon Dover MD     January 12, 2020

## 2020-01-12 NOTE — PROGRESS NOTES
Dr Lenore Sun updated with ABG results. Orders received to increase RR and give 1 amp bicarb.   Dr Jansen Bolds also at bedside

## 2020-01-12 NOTE — PROGRESS NOTES
Ventilator check complete; patient has a #8. 0 ET tube secured at the 25 at the lip. Patient is sedated. Patient is able to follow commands. Breath sounds are diminished. Trachea is midline, Negative for subcutaneous air, and chest excursion is symmetric. Patient is also Negative for cyanosis. All alarms are set and audible. Resuscitation bag is at the head of the bed. Ventilator Settings  Mode FIO2 Rate Tidal Volume Pressure PEEP I:E Ratio   PRVC  60 %   25 500 ml    8 cm H20  1:1.43      Peak airway pressure: 30.7 cm H2O   Minute ventilation: 11.9 l/min     ABG: No results for input(s): PH, PCO2, PO2, HCO3 in the last 72 hours.       Noah Rodriguez, RT

## 2020-01-12 NOTE — PROGRESS NOTES
Labs noted WBC up to 21 now, and having fevers. procal if up to 4.23. panculture and start abx, if PMD agreeable. cxr with chest congestion. Cr worse and spoke with renal for HD today. K+ still in 6 range. For abx will use Aztreonam/vanco/flagyl. Noted ahs liam and cephalosporin allergy. Currently critically ill and do not want to attempt to try those abx classes taht may further decompensate this sick patient. Carol Zamudio MD          LABS    Recent Labs     01/12/20  0112 01/11/20  1015 01/11/20  0256  01/10/20  1429   WBC 21.3*  --  10.1  --  16.9*   HGB 8.9* 8.3* 8.3*   < > 9.0*   HCT 28.2* 25.9* 25.4*   < > 28.5*   *  --  115*  --  98*    < > = values in this interval not displayed. Recent Labs     01/12/20  0112 01/12/20  0028 01/11/20  1813 01/11/20  1255 01/11/20  1015 01/11/20  0256 01/10/20  2141  01/10/20  1429     --   --  143 145 142 141  --  142   K 6.5* 6.4* 6.1* 5.6* 5.1 4.9 5.8*   < > 5.0   *  --   --  113* 114* 113* 112*  --  114*   *  --   --  119* 112* 142* 151*  --  117*   CO2 23  --   --  24 24 23 22  --  22   BUN 43*  --   --  38* 35* 35* 32*  --  29*   CREA 3.51*  --   --  3.10* 2.97* 2.67* 2.51*  --  2.23*   MG  --   --   --  2.6*  --  2.3 2.5*   < > 2.6*   INR 1.4  --   --   --   --   --   --   --  1.6    < > = values in this interval not displayed. Recent Labs     01/12/20  0200 01/12/20  0101 01/11/20  0304   PHI 7.368 7.212* 7.313*   PCO2I 37.5 53.5* 39.9   PO2I 77 96 68*   HCO3I 21.6* 21.5* 20.3*     No results for input(s): LCAD, LAC in the last 72 hours.

## 2020-01-12 NOTE — PROGRESS NOTES
Cardiovascular ICU Progress Note: 1/12/2020  Louisa Fernandez  Admission Date: 1/10/2020     Patient had CABG x 3 and AVR . The patient's chart is reviewed and the patient is discussed with the staff. Subjective:     Patient still on vent and needing more pressors. ABG noted and pH is down to 7.21/54/96 on 60% with rate of 18. Rate was increased to 25. Currently sedated on Fentanyl and Versed      Review of Systems  Review of systems not obtained due to patient factors. Allergies   Allergen Reactions    Amoxicillin Rash    Keflex [Cephalexin] Rash    Pcn [Penicillins] Other (comments)     \"felt closed in\".   No rash       Current Facility-Administered Medications   Medication Dose Route Frequency    albuterol (PROVENTIL VENTOLIN) nebulizer solution 2.5 mg  2.5 mg Nebulization QID PRN    fentaNYL in normal saline (pf) 25 mcg/mL infusion  0-200 mcg/hr IntraVENous TITRATE    furosemide (LASIX) 100 mg in 0.9% sodium chloride 100 mL infusion  20 mg/hr IntraVENous CONTINUOUS    sodium polystyrene (KAYEXALATE) 15 gram/60 mL oral suspension 15 g  15 g Per NG tube Q6H    midazolam (VERSED) 100 mg in 0.9% sodium chloride 100 mL infusion  0-10 mg/hr IntraVENous TITRATE    acetaminophen (TYLENOL) solution 650 mg  650 mg Per NG tube Q4H PRN    lip protectant (BLISTEX) 0.6-0.5-1-0.5 % ointment        0.9% sodium chloride infusion  25 mL/hr IntraVENous CONTINUOUS    sodium chloride (NS) flush 5-40 mL  5-40 mL IntraVENous Q8H    sodium chloride (NS) flush 5-40 mL  5-40 mL IntraVENous PRN    oxyCODONE-acetaminophen (PERCOCET) 5-325 mg per tablet 1 Tab  1 Tab Oral Q4H PRN    morphine 10 mg/ml injection 3 mg  3 mg IntraVENous Q1H PRN    naloxone (NARCAN) injection 0.4 mg  0.4 mg IntraVENous PRN    mupirocin (BACTROBAN) 2 % ointment   Both Nostrils BID    EPINEPHrine (ADRENALIN) 4 mg in 0.9% sodium chloride 250 mL infusion  0.04-0.1 mcg/kg/min IntraVENous TITRATE    nitroglycerin (Tridil) 200 mcg/ml infusion   mcg/min IntraVENous TITRATE    [Held by provider] amiodarone (CORDARONE) tablet 200 mg  200 mg Oral Q12H    metoprolol tartrate (LOPRESSOR) tablet 25 mg  25 mg Oral Q12H    atorvastatin (LIPITOR) tablet 80 mg  80 mg Oral QHS    insulin regular (NOVOLIN R, HUMULIN R) 100 Units in 0.9% sodium chloride 100 mL infusion  1 Units/hr IntraVENous TITRATE    dextrose (D50W) injection syrg 12.5 g  25 mL IntraVENous PRN    aspirin chewable tablet 81 mg  81 mg Oral DAILY    magnesium sulfate 1 g/100 ml IVPB (premix or compounded)  1 g IntraVENous PRN    midazolam (VERSED) injection 1 mg  1 mg IntraVENous Q1H PRN    propofol (DIPRIVAN) 10 mg/mL infusion  0-50 mcg/kg/min IntraVENous TITRATE    chlorhexidine (PERIDEX) 0.12 % mouthwash 10 mL  10 mL Oral BID    morphine 10 mg/ml injection 4 mg  4 mg IntraVENous Q1H PRN    Or    morphine 10 mg/ml injection 5 mg  5 mg IntraVENous Q1H PRN    NOREPINephrine (LEVOPHED) 4 mg in 5% dextrose 250 mL infusion  0.05-0.2 mcg/kg/min IntraVENous TITRATE    dexmedeTOMidine (PRECEDEX) 400 mcg in 0.9% sodium chloride 100 mL infusion  0.2-0.7 mcg/kg/hr IntraVENous TITRATE    dextrose 5 % - 0.45% NaCl infusion  25 mL/hr IntraVENous CONTINUOUS    vasopressin (VASOSTRICT) 20 Units in 0.9% sodium chloride 100 mL infusion  0-0.1 Units/min IntraVENous TITRATE    PHENYLephrine (PF)(LEWIS-SYNEPHRINE) 30 mg in 0.9% sodium chloride 250 mL infusion   mcg/min IntraVENous TITRATE    sodium bicarbonate (8.4%) injection 50 mEq  50 mEq IntraVENous PRN    famotidine (PF) (PEPCID) 20 mg in 0.9% sodium chloride 10 mL injection  20 mg IntraVENous DAILY    aminocaproic acid (AMICAR) 5 g in 0.9% sodium chloride 250 mL infusion  1 g/hr IntraVENous CONTINUOUS         Objective:     Vitals:    01/11/20 2300 01/11/20 2315 01/11/20 2330 01/12/20 0115   BP:       Pulse: 83 83 83 83   Resp: 18 12 17 25   Temp: (!) 101.1 °F (38.4 °C)      SpO2: 98% 99% 99% 100%   Weight: Height:           Intake and Output:   01/10 0701 - 01/11 1900  In: 8329.6 [I.V.:7429.6]  Out: 7643 [Urine:2071]  01/11 1901 - 01/12 0700  In: -   Out: 46 [Urine:330]    Physical Exam:          Constitutional:  Sedated, orally intubated and mechanically ventilated. EENMT:  Sclera clear, pupils equal, oral mucosa moist and orally intubated  Respiratory: slightly coarse but has b/l breath sounds. Cardiovascular:  RRR with no M,G,R;  Gastrointestinal:  soft; no bowel sounds present  Musculoskeletal:  warm with no cyanosis, no lower extremity edema. [place] site  leg with ace wrap. Moving  SKIN:  no jaundice or ecchymosis   Neurologic:   no gross neuro deficits  Psychiatric:  Agitated and in restraints. CXR:  Pending and just ordered. LINES:  ETT, brown, swan sherri, arterial line, chest tubes times 2 in epigastric area without air leak.     DRIPS:  Levophed,Vaso, epi, versed, fentanyl,  Insulin, lasix    CI:  2.6    Ventilator Settings  Mode FIO2 Rate Tidal Volume Pressure PEEP   PRVC  60 %    500 ml  10 cm H2O  8 cm H20      Peak airway pressure: 39 cm H2O   Minute ventilation: 12 l/min     ABG:   Recent Labs     01/12/20  0101 01/11/20  0304 01/11/20  0036   PHI 7.212* 7.313* 7.370   PCO2I 53.5* 39.9 35.5   PO2I 96 68* 85   HCO3I 21.5* 20.3* 20.6*        LAB  Recent Labs     01/11/20  1015 01/11/20  0256 01/10/20  2141 01/10/20  1815 01/10/20  1429   WBC  --  10.1  --   --  16.9*   HGB 8.3* 8.3* 8.9* 8.9* 9.0*   HCT 25.9* 25.4* 27.5* 27.8* 28.5*   PLT  --  115*  --   --  98*   INR  --   --   --   --  1.6     Recent Labs     01/12/20  0028 01/11/20  1813 01/11/20  1255 01/11/20  1015 01/11/20  0256 01/10/20  2141   NA  --   --  143 145 142 141   K 6.4* 6.1* 5.6* 5.1 4.9 5.8*   CL  --   --  113* 114* 113* 112*   CO2  --   --  24 24 23 22   GLU  --   --  119* 112* 142* 151*   BUN  --   --  38* 35* 35* 32*   CREA  --   --  3.10* 2.97* 2.67* 2.51*   MG  --   --  2.6*  --  2.3 2.5*   CA  --   --  8.0* 8.1* 8. 1* 8.1*         Assessment and Plan :  (Medical Decision Making)     Hospital Problems  Date Reviewed: 12/6/2019          Codes Class Noted POA    Aortic valve stenosis ICD-10-CM: I35.0  ICD-9-CM: 424.1  1/10/2020 Unknown        * (Principal) CAD (coronary artery disease) ICD-10-CM: I25.10  ICD-9-CM: 414.00  1/10/2020 Unknown        S/P CABG x 3 ICD-10-CM: Z95.1  ICD-9-CM: V45.81  1/10/2020 Unknown        S/P AVR ICD-10-CM: Z95.2  ICD-9-CM: V43.3  1/10/2020 Unknown        Weaning from respirator [facility]) ICD-10-CM: Z99.11  ICD-9-CM: V46.13  1/10/2020 Unknown        Metabolic acidosis S-69-TM: E87.2  ICD-9-CM: 276.2  1/11/2020 Unknown        Acute renal failure (ARF) (HCC) ICD-10-CM: N17.9  ICD-9-CM: 584.9  1/11/2020 Unknown        Hyperkalemia ICD-10-CM: E87.5  ICD-9-CM: 276.7  1/11/2020 Unknown        Hypoxemia ICD-10-CM: R09.02  ICD-9-CM: 799.02  1/10/2020 Unknown              69  y old morbidly obese s/p AVR and CABG x3. Still hypotensive but now on 3 pressors and acidotic. Cr is worsening with minimal urine output despite lasix drip. Last x-ray did have small Right sided PTX    Plan:   --agree with change in Vent  --cxr now to see if worsening Right sided PTX  --k+ is up to 6.4 now and urine is low, Cr was going up. PMD is coming in shortly. Renal following and may need HD. On lasix drip  --to get kxylate again  --Bronchodilators per protocol.  --wean slowly -- not ready at this time  --on sedation for now.   --has temp now and if persistent consider cultures and abx, if ok with PMD. Will check procal.  --continue remaining treatment per PMD.      More than 50% of the time documented was spent in face-to-face contact with the patient and in the care of the patient on the floor/unit where the patient is located.               Daniel Banks MD

## 2020-01-12 NOTE — PROGRESS NOTES
[facility]        Subjective: A on CKD3   Intubated, sedated,  On increasing doses of triple pressors. Review of Systems -   Can not be obtained due to pt's condition. Objective:    Vitals:    01/12/20 0600 01/12/20 0615 01/12/20 0620 01/12/20 0630   BP:       Pulse: 82 83  83   Resp: 16 25  26   Temp: (!) 100.5 °F (38.1 °C)      SpO2: 96% 97%  97%   Weight:   146.3 kg (322 lb 8.5 oz)    Height:           PE  Gen: intubated, multiple pressors. .  CV:reg rate  Chest:bilat breath sounds  Abd: soft  Ext/Access: Left Subclavian Temp HD cath      . LAB  Recent Labs     01/12/20  0112 01/11/20  1015 01/11/20  0256  01/10/20  1429   WBC 21.3*  --  10.1  --  16.9*   HGB 8.9* 8.3* 8.3*   < > 9.0*   HCT 28.2* 25.9* 25.4*   < > 28.5*   *  --  115*  --  98*   INR 1.4  --   --   --  1.6    < > = values in this interval not displayed.      Recent Labs     01/12/20  0112 01/12/20  0028 01/11/20  1813 01/11/20  1255 01/11/20  1015 01/11/20  0256 01/10/20  2141     --   --  143 145 142 141   K 6.5* 6.4* 6.1* 5.6* 5.1 4.9 5.8*   *  --   --  113* 114* 113* 112*   CO2 23  --   --  24 24 23 22   *  --   --  119* 112* 142* 151*   BUN 43*  --   --  38* 35* 35* 32*   CREA 3.51*  --   --  3.10* 2.97* 2.67* 2.51*   MG  --   --   --  2.6*  --  2.3 2.5*   CA 7.8*  --   --  8.0* 8.1* 8.1* 8.1*   ALB 3.1*  --   --   --   --   --   --    TBILI 0.4  --   --   --   --   --   --    ALT 10*  --   --   --   --   --   --    SGOT 64*  --   --   --   --   --   --            Radiology    A/P:   Patient Active Problem List   Diagnosis Code    DM [facility] (di[facility] mellitus[facility] type 2) [facility] E11.9    Gout M10.9    HTN (hypertension) I10    BPH (benign prostatic hyperplasia) N40.0    Seasonal allergic rhinitis J30.2    HLD (hyperlipidemia) E78.5    Nonrheumatic aortic valve stenosis I35.0    Obesity, morbid (HCC) E66.01    Type 2 diabetes with nephropathy (HCC) E11.21    Bilateral carotid artery stenosis T36.19    Aortic valve stenosis I35.0    CAD (coronary artery disease) I25.10    Weaning from respirator ([facility]) Z99.11    Hypoxemia R09.02    S/P CABG x 3 Z95.1    S/P AVR G06.0    Metabolic acidosis K88.1    Acute renal failure (ARF) ([facility]) N17.9    Hyperkalemia E87.5     A on CKD - Non-olguric. Renal US unremarkable. Creatinine continuing to climb. K is up despite kayexelate. Will proceed with CRRT this am.   Pt seen on CRRT at 9:01am  On dialysis for clearance. ASHD/ AS - s/p CABG  Aortic Valve replacement    DM    HTN/ Volume - not sure he will tolerate much volume removal. Will start at 100 ml / hr. Resp Failure - on vent.         Lisbeth Benjamin MD

## 2020-01-12 NOTE — PROGRESS NOTES
Ventilator check complete; patient has a #8. 0 ET tube secured at the 25 at the lip. Patient is sedated. Patient is not able to follow commands. Breath sounds are clear. Trachea is midline, Negative for subcutaneous air, and chest excursion is symmetric. Patient is also Negative for cyanosis and is Positive for pitting edema. All alarms are set and audible. Resuscitation bag is at the head of the bed. Ventilator Settings  Mode FIO2 Rate Tidal Volume Pressure PEEP I:E Ratio   SIMV, Pressure support, PRVC  60 %    500 ml  10 cm H2O  8 cm H20  1:2.22      Peak airway pressure: 36.3 cm H2O   Minute ventilation: 8.5 l/min     ABG: No results for input(s): PH, PCO2, PO2, HCO3 in the last 72 hours.       Wesley Solis, RT

## 2020-01-13 PROBLEM — I48.91 ATRIAL FIBRILLATION (HCC): Status: ACTIVE | Noted: 2020-01-01

## 2020-01-13 NOTE — PROGRESS NOTES
Ventilator check complete; patient has a #8. 0 ET tube secured at the 25 at the lip. Patient is sedated. Patient is not able to follow commands. Breath sounds are diminished. Trachea is midline, Negative for subcutaneous air, and chest excursion is symmetric. Patient is also Negative for cyanosis and is Negative for pitting edema. All alarms are set and audible. Resuscitation bag is at the head of the bed. Ventilator Settings  Mode FIO2 Rate Tidal Volume Pressure PEEP I:E Ratio   PRVC  59 %    500 ml  10 cm H2O  10 cm H20  1:1.43      Peak airway pressure: 30.6 cm H2O   Minute ventilation: 12 l/min     ABG: No results for input(s): PH, PCO2, PO2, HCO3 in the last 72 hours.       Deana Rodrigez, RT

## 2020-01-13 NOTE — PROGRESS NOTES
[facility]        Subjective: A on CKD3   Intubated, sedated,  On increasing doses of triple pressors. Review of Systems -   Can not be obtained due to pt's condition. Objective:    Vitals:    01/13/20 0730 01/13/20 0745 01/13/20 0800 01/13/20 0818   BP:       Pulse: (!) 101 94 (!) 101 (!) 103   Resp: 25 19 20 25   Temp:   99.9 °F (37.7 °C)    SpO2: 100% 100% 100% 100%   Weight:       Height:           PE  Gen: intubated, multiple pressors. .  CV:reg rate  Chest:bilat breath sounds  Abd: soft  Ext/Access: Left Subclavian Temp HD cath      . LAB  Recent Labs     01/13/20  0200 01/12/20  0112 01/11/20  1015 01/11/20  0256  01/10/20  1429   WBC 17.0* 21.3*  --  10.1  --  16.9*   HGB 8.0* 8.9* 8.3* 8.3*   < > 9.0*   HCT 25.1* 28.2* 25.9* 25.4*   < > 28.5*   PLT 84* 105*  --  115*  --  98*   INR  --  1.4  --   --   --  1.6    < > = values in this interval not displayed.      Recent Labs     01/13/20  0201 01/12/20  1833 01/12/20  0842 01/12/20  0112     --  142 139   K 3.9 4.2 4.7 6.5*     --  110* 110*   CO2 30  --  26 23   *  --  109* 160*   BUN 11  --  46* 43*   CREA 1.44  --  3.63* 3.51*   MG 1.8 2.0 2.3  --    PHOS 3.1  --  4.2*  --    CA 7.9*  --  8.4 7.8*   ALB 2.5*  --  2.9* 3.1*   TBILI  --   --   --  0.4   ALT  --   --   --  10*   SGOT  --   --   --  59*           Radiology    A/P:   Patient Active Problem List   Diagnosis Code    DM type 2 (diabetes mellitus, type 2) (HCC) E11.9    Gout M10.9    HTN (hypertension) I10    BPH (benign prostatic hyperplasia) N40.0    Seasonal allergic rhinitis J30.2    HLD (hyperlipidemia) E78.5    Nonrheumatic aortic valve stenosis I35.0    Obesity, morbid ([facility]) E66.01    Type 2 diabetes with nephropathy ([facility]) E11.21    Bilateral carotid artery stenosis I65.23    Aortic valve stenosis I35.0    CAD (coronary artery disease) I25.10    Weaning from respirator ([facility]) Z99.11    Hypoxemia R09.02    S/P CABG x 3 Z95.1    S/P AVR Q57.6    Metabolic acidosis L52.0    Acute renal failure (ARF) (HCC) N17.9    Hyperkalemia E87.5     A on CKD - Non-olguric. Renal US unremarkable. Seen on CRRT, plan to continue as tolerated. Once pressors are tapered off consider restarting Lasix - he is nonoliguric when off the machine. ASHD/ AS - s/p CABG  Aortic Valve replacement    DM    HTN/ Volume - UF as tolerated    Resp Failure - on vent.         Cary Greenwood MD

## 2020-01-13 NOTE — PROGRESS NOTES
A follow up visit was made to the patient. Emotional support, spiritual presence and   prayer were provided for the patient. The patient was resting and not alert.       EDMOND Lynch

## 2020-01-13 NOTE — PROGRESS NOTES
Ventilator check complete; patient has a #8. 0 ET tube secured at the 25 at the lip. Patient is sedated. Patient is not able to follow commands. Breath sounds are coarse and diminished. Trachea is midline, Negative for subcutaneous air, and chest excursion is symmetric. Patient is also Negative for cyanosis and is Positive for pitting edema. All alarms are set and audible. Resuscitation bag is at the head of the bed.       Ventilator Settings  Mode FIO2 Rate Tidal Volume Pressure PEEP I:E Ratio   PRVC  60 %   25 500 ml  10 cm H2O  8 cm H20  1:1.43      Peak airway pressure: 30.2 cm H2O   Minute ventilation: 11.9 l/min         Felix Purdy, RT

## 2020-01-13 NOTE — PROGRESS NOTES
Pharmacokinetic Consult to Pharmacist    Jovain Deshawn is a 69 y.o. male being treated for sepsis with aztreonam, metronidazole, and vancomycin. Height: 5' 10\" (177.8 cm)  Weight: 147.4 kg (324 lb 15.3 oz)  Lab Results   Component Value Date/Time    BUN 11 01/13/2020 02:01 AM    Creatinine 1.44 01/13/2020 02:01 AM    WBC 17.0 (H) 01/13/2020 02:00 AM    Procalcitonin 4.23 01/12/2020 01:53 AM      Estimated Creatinine Clearance: 66.4 mL/min (based on SCr of 1.44 mg/dL). Lab Results   Component Value Date/Time    Vancomycin, random 8.8 01/13/2020 11:46 AM         Day 2 of vancomycin. Goal trough is 15-20, but dosing intermittently based on random levels due to CRRT. Vancomycin random level resulted at 8.8, so will re-dose with 2000 mg X 1 today. Patient continues on CRRT that was started on 1/12/2020 around 0900. Plan is for 72 hours of CRRT if tolerated/line remains open. Will plan next random level tomorrow at 1400. Will re-dose if less than 20.     Thank you,  Vlad Hughes, PharmD, [address]  Clinical Pharmacist  [phone]

## 2020-01-13 NOTE — PROGRESS NOTES
POD 3 Days Post-Op    Procedure:  Procedure(s):  CORONARY ARTERY BYPASS GRAFT WITH AVR/ (CABG X 3 )/ LIMA  VEIN HARVEST/ GREATER SAPHENOUS  ESOPHAGEAL TRANS ECHOCARDIOGRAM      Subjective:     Sedated on vent      Objective:     Patient Vitals for the past 8 hrs:   Temp Pulse Resp SpO2 Weight   01/13/20 0715  (!) 114 16 100 %    01/13/20 0700 100 °F (37.8 °C) (!) 103 9 100 %    01/13/20 0645  95 20 100 %    01/13/20 0630  98 17 100 %    01/13/20 0620  97 24 100 %    01/13/20 0615  (!) 103 11 100 %    01/13/20 0610  88 25 100 %    01/13/20 0605  100 24 100 %    01/13/20 0600 99.9 °F (37.7 °C) (!) 103 25 100 %    01/13/20 0555  (!) 108 20 100 %    01/13/20 0550  (!) 101 17 100 %    01/13/20 0545  (!) 102 25 100 %    01/13/20 0540  100 25 100 %    01/13/20 0535  (!) 104 26 100 %    01/13/20 0530  100 9 97 %    01/13/20 0525  (!) 104 13 100 %    01/13/20 0520  (!) 105 26 100 %    01/13/20 0515  93 24 100 %    01/13/20 0510  99 25 100 %    01/13/20 0505  99 24 100 %    01/13/20 0500 100 °F (37.8 °C) 94 25 100 % 324 lb 15.3 oz (147.4 kg)   01/13/20 0455  97 25 100 %    01/13/20 0450  97 25 100 %    01/13/20 0445  94 25 100 %    01/13/20 0440  99 25 100 %    01/13/20 0435  93 26 100 %    01/13/20 0430  (!) 103 25 100 %    01/13/20 0425  96 26 100 %    01/13/20 0420  98 25 100 %    01/13/20 0415  97 25 100 %    01/13/20 0410  94 24 100 %    01/13/20 0405  (!) 101 25 100 %    01/13/20 0400 100 °F (37.8 °C) (!) 106 25 100 %    01/13/20 0355  99 21 100 %    01/13/20 0350  98 26 100 %    01/13/20 0345  (!) 101 17 100 %    01/13/20 0340  99 11 100 %    01/13/20 0337  93 25 100 %    01/13/20 0335  (!) 102 25 100 %    01/13/20 0330  100 25 100 %    01/13/20 0325  92 14 100 %    01/13/20 0320  (!) 101 21 100 %    01/13/20 0315  95 11 100 %    01/13/20 0310  (!) 101 25 100 %    01/13/20 0305  (!) 105 25 100 %    01/13/20 0300 100.1 °F (37.8 °C) (!) 104 25 100 %    20 0245  (!) 108 25 100 %    20 0230  (!) 105 13 100 %    20 0215  98 12 100 %    20 0210  (!) 102 11 100 %    20 0205  100 16 100 %    20 0200 100.1 °F (37.8 °C) (!) 102 14 100 %    20 0155  (!) 110 17 100 %    20 0150  (!) 106 12 100 %    20 0145  (!) 101 15 100 %    20 0140  (!) 105 20 100 %    20 0135  (!) 103 17 100 %    20 0130  98 25 100 %    20 0125  (!) 104 14 100 %    20 0120  (!) 102 24 100 %    20 0115  100 17 99 %    20 0100 100.3 °F (37.9 °C) (!) 103 16 100 %    20 0045  (!) 108 25 100 %    20 0030  (!) 106 25 100 %    20 0015 (!) 100.5 °F (38.1 °C) (!) 109 19 100 %    20 2345  (!) 113 (!) 4 100 %      Temp (24hrs), Av.1 °F (37.8 °C), Min:99.6 °F (37.6 °C), Max:100.7 °F (38.2 °C)        Hemodynamics  PAP Systolic: 46 PAP  CO (l/min): 5.1 l/min CO  CI (l/min/m2): 2.1 l/min/m2 CI    No intake/output data recorded.    1901 -  0700  In: 7319.3 [I.V.:6889.3]  Out: 4129 [Urine:1270]    CT Drainage              total of all CT's    Heart:  irregularly irregular rhythm  Lung:  clear to auscultation bilaterally  Neuro: Grossly non focal  Incisions: Clean, dry, and intact    Labs:  Recent Results (from the past 24 hour(s))   RENAL FUNCTION PANEL    Collection Time: 20  8:42 AM   Result Value Ref Range    Sodium 142 136 - 145 mmol/L    Potassium 4.7 3.5 - 5.1 mmol/L    Chloride 110 (H) 98 - 107 mmol/L    CO2 26 21 - 32 mmol/L    Anion gap 6 (L) 7 - 16 mmol/L    Glucose 109 (H) 65 - 100 mg/dL    BUN 46 (H) 8 - 23 MG/DL    Creatinine 3.63 (H) 0.8 - 1.5 MG/DL    GFR est AA 21 (L) >60 ml/min/1.73m2    GFR est non-AA 18 (L) >60 ml/min/1.73m2    Calcium 8.4 8.3 - 10.4 MG/DL    Phosphorus 4.2 (H) 2.3 - 3.7 MG/DL    Albumin 2.9 (L) 3.2 - 4.6 g/dL   MAGNESIUM    Collection Time: 20  8:42 AM   Result Value Ref Range Magnesium 2.3 1.8 - 2.4 mg/dL   CULTURE, BLOOD    Collection Time: 01/12/20  8:42 AM   Result Value Ref Range    Special Requests: RIGHT  Antecubital        Culture result: NO GROWTH AFTER 20 HOURS     CULTURE, BLOOD    Collection Time: 01/12/20  8:53 AM   Result Value Ref Range    Special Requests: LEFT  HAND        Culture result: NO GROWTH AFTER 20 HOURS     URINALYSIS W/ RFLX MICROSCOPIC    Collection Time: 01/12/20  9:01 AM   Result Value Ref Range    Color YELLOW      Appearance CLOUDY      Specific gravity 1.011 1.001 - 1.023      pH (UA) 5.5 5.0 - 9.0      Protein NEGATIVE  NEG mg/dL    Glucose NEGATIVE  mg/dL    Ketone NEGATIVE  NEG mg/dL    Bilirubin NEGATIVE  NEG      Blood LARGE (A) NEG      Urobilinogen 0.2 0.2 - 1.0 EU/dL    Nitrites NEGATIVE  NEG      Leukocyte Esterase TRACE (A) NEG      WBC 5-10 0 /hpf    RBC 20-50 0 /hpf    Epithelial cells 0-3 0 /hpf    Bacteria TRACE 0 /hpf   CULTURE, URINE    Collection Time: 01/12/20  9:01 AM   Result Value Ref Range    Special Requests: NO SPECIAL REQUESTS      Culture result: NO GROWTH 1 DAY     GLUCOSE, POC    Collection Time: 01/12/20  9:10 AM   Result Value Ref Range    Glucose (POC) 108 (H) 65 - 100 mg/dL   CULTURE, RESPIRATORY/SPUTUM/BRONCH W GRAM STAIN    Collection Time: 01/12/20  9:17 AM   Result Value Ref Range    Special Requests: NO SPECIAL REQUESTS      GRAM STAIN 15 TO 20 WBCS SEEN PER OIF     GRAM STAIN 0 TO 1 EPITHELIAL CELLS SEEN PER OIF     GRAM STAIN 2+ MUCUS PRESENT      GRAM STAIN RARE GRAM POSITIVE COCCI      Culture result: PENDING    GLUCOSE, POC    Collection Time: 01/12/20 10:19 AM   Result Value Ref Range    Glucose (POC) 106 (H) 65 - 100 mg/dL   GLUCOSE, POC    Collection Time: 01/12/20 11:10 AM   Result Value Ref Range    Glucose (POC) 104 (H) 65 - 100 mg/dL   GLUCOSE, POC    Collection Time: 01/12/20 12:04 PM   Result Value Ref Range    Glucose (POC) 104 (H) 65 - 100 mg/dL   PTT    Collection Time: 01/12/20 12:06 PM   Result Value Ref Range    aPTT 33.5 24.7 - 39.8 SEC   GLUCOSE, POC    Collection Time: 01/12/20  1:20 PM   Result Value Ref Range    Glucose (POC) 101 (H) 65 - 100 mg/dL   EKG, 12 LEAD, SUBSEQUENT    Collection Time: 01/12/20  3:16 PM   Result Value Ref Range    Ventricular Rate 68 BPM    Atrial Rate 81 BPM    QRS Duration 146 ms    Q-T Interval 500 ms    QTC Calculation (Bezet) 531 ms    Calculated R Axis 43 degrees    Calculated T Axis 9 degrees    Diagnosis       Wide QRS rhythm with frequent Premature ventricular complexes  Right bundle branch block  ST elevation consider inferolateral injury or acute infarct  ** ** ACUTE MI / STEMI ** **  Abnormal ECG  No previous ECGs available     GLUCOSE, POC    Collection Time: 01/12/20  3:35 PM   Result Value Ref Range    Glucose (POC) 104 (H) 65 - 100 mg/dL   GLUCOSE, POC    Collection Time: 01/12/20  5:14 PM   Result Value Ref Range    Glucose (POC) 98 65 - 100 mg/dL   PTT    Collection Time: 01/12/20  5:18 PM   Result Value Ref Range    aPTT 39.6 24.7 - 39.8 SEC   GLUCOSE, POC    Collection Time: 01/12/20  6:30 PM   Result Value Ref Range    Glucose (POC) 120 (H) 65 - 100 mg/dL   POTASSIUM    Collection Time: 01/12/20  6:33 PM   Result Value Ref Range    Potassium 4.2 3.5 - 5.1 mmol/L   MAGNESIUM    Collection Time: 01/12/20  6:33 PM   Result Value Ref Range    Magnesium 2.0 1.8 - 2.4 mg/dL   GLUCOSE, POC    Collection Time: 01/12/20  7:43 PM   Result Value Ref Range    Glucose (POC) 101 (H) 65 - 100 mg/dL   POC G3    Collection Time: 01/12/20  8:34 PM   Result Value Ref Range    Device: VENT      FIO2 (POC) 100 %    pH (POC) 7.407 7.35 - 7.45      pCO2 (POC) 48.1 (H) 35 - 45 MMHG    pO2 (POC) 193 (H) 75 - 100 MMHG    HCO3 (POC) 30.3 (H) 22 - 26 MMOL/L    sO2 (POC) 100 (H) 95 - 98 %    Base excess (POC) 5 mmol/L    Mode Pressure regulated volume control      Tidal volume 500 ml    Set Rate 5 bpm    PEEP/CPAP (POC) 10 cmH2O    Allens test (POC) NOT APPLICABLE      Site DRAWN FROM ARTERIAL LINE      Patient temp.  98.6      Specimen type (POC) ARTERIAL      Performed by Micky     CO2, POC 32 MMOL/L    Exhaled minute volume 12.40 L/min    COLLECT TIME 2,030     GLUCOSE, POC    Collection Time: 01/12/20  9:13 PM   Result Value Ref Range    Glucose (POC) 125 (H) 65 - 100 mg/dL   PTT    Collection Time: 01/12/20  9:50 PM   Result Value Ref Range    aPTT 37.7 24.7 - 39.8 SEC   GLUCOSE, POC    Collection Time: 01/12/20 10:02 PM   Result Value Ref Range    Glucose (POC) 133 (H) 65 - 100 mg/dL   GLUCOSE, POC    Collection Time: 01/13/20 12:09 AM   Result Value Ref Range    Glucose (POC) 118 (H) 65 - 100 mg/dL   GLUCOSE, POC    Collection Time: 01/13/20  1:08 AM   Result Value Ref Range    Glucose (POC) 116 (H) 65 - 100 mg/dL   PTT    Collection Time: 01/13/20  2:00 AM   Result Value Ref Range    aPTT 50.4 (H) 24.7 - 39.8 SEC   CBC W/O DIFF    Collection Time: 01/13/20  2:00 AM   Result Value Ref Range    WBC 17.0 (H) 4.3 - 11.1 K/uL    RBC 2.53 (L) 4.23 - 5.6 M/uL    HGB 8.0 (L) 13.6 - 17.2 g/dL    HCT 25.1 (L) 41.1 - 50.3 %    MCV 99.2 (H) 79.6 - 97.8 FL    MCH 31.6 26.1 - 32.9 PG    MCHC 31.9 31.4 - 35.0 g/dL    RDW 13.4 11.9 - 14.6 %    PLATELET 84 (L) 081 - 450 K/uL    MPV 11.8 9.4 - 12.3 FL    ABSOLUTE NRBC 0.00 0.0 - 0.2 K/uL   RENAL FUNCTION PANEL    Collection Time: 01/13/20  2:01 AM   Result Value Ref Range    Sodium 136 136 - 145 mmol/L    Potassium 3.9 3.5 - 5.1 mmol/L    Chloride 100 98 - 107 mmol/L    CO2 30 21 - 32 mmol/L    Anion gap 6 (L) 7 - 16 mmol/L    Glucose 110 (H) 65 - 100 mg/dL    BUN 11 8 - 23 MG/DL    Creatinine 1.44 0.8 - 1.5 MG/DL    GFR est AA >60 >60 ml/min/1.73m2    GFR est non-AA 51 (L) >60 ml/min/1.73m2    Calcium 7.9 (L) 8.3 - 10.4 MG/DL    Phosphorus 3.1 2.3 - 3.7 MG/DL    Albumin 2.5 (L) 3.2 - 4.6 g/dL   MAGNESIUM    Collection Time: 01/13/20  2:01 AM   Result Value Ref Range    Magnesium 1.8 1.8 - 2.4 mg/dL   GLUCOSE, POC    Collection Time: 01/13/20 2:06 AM   Result Value Ref Range    Glucose (POC) 110 (H) 65 - 100 mg/dL   GLUCOSE, POC    Collection Time: 01/13/20  3:07 AM   Result Value Ref Range    Glucose (POC) 115 (H) 65 - 100 mg/dL   POC G3    Collection Time: 01/13/20  3:28 AM   Result Value Ref Range    Device: VENT      FIO2 (POC) 60 %    pH (POC) 7.443 7.35 - 7.45      pCO2 (POC) 42.2 35 - 45 MMHG    pO2 (POC) 115 (H) 75 - 100 MMHG    HCO3 (POC) 28.9 (H) 22 - 26 MMOL/L    sO2 (POC) 99 (H) 95 - 98 %    Base excess (POC) 4 mmol/L    Mode Pressure regulated volume control      Tidal volume 500 ml    Set Rate 25 bpm    PEEP/CPAP (POC) 10 cmH2O    Allens test (POC) NOT APPLICABLE      Site DRAWN FROM ARTERIAL LINE      Patient temp. 98.6      Specimen type (POC) ARTERIAL      Performed by HamblenLaurieRT     CO2, POC 30 MMOL/L    Exhaled minute volume 12.00 L/min    COLLECT TIME 325     POC VENOUS BLOOD GAS    Collection Time: 01/13/20  3:32 AM   Result Value Ref Range    Device: VENT      FIO2 (POC) 60 %    pH, venous (POC) 7.388 7.32 - 7.42      pCO2, venous (POC) 48.7 41 - 51 MMHG    pO2, venous (POC) 23 (LL) mmHg    HCO3, venous (POC) 29.4 (H) 23 - 28 MMOL/L    sO2, venous (POC) 39 (L) 65 - 88 %    Base excess, venous (POC) 4 mmol/L    Mode Pressure regulated volume control      Tidal volume 500 ml    Set Rate 25 bpm    PEEP/CPAP (POC) 10 cmH2O    Allens test (POC) NOT APPLICABLE      Site [facility]      Patient temp.  98.6      Specimen type (POC) MIXED VENOUS      Performed by HamblenLaurieRT     CO2, POC 31 MMOL/L    Critical value read back 03:35     Exhaled minute volume 12.00 L/min    COLLECT TIME 330     GLUCOSE, POC    Collection Time: 01/13/20  4:00 AM   Result Value Ref Range    Glucose (POC) 114 (H) 65 - 100 mg/dL   GLUCOSE, POC    Collection Time: 01/13/20  5:05 AM   Result Value Ref Range    Glucose (POC) 118 (H) 65 - 100 mg/dL   GLUCOSE, POC    Collection Time: 01/13/20  6:11 AM   Result Value Ref Range    Glucose (POC) 121 (H) 65 - 100 mg/dL   PTT    Collection Time: 01/13/20  6:16 AM   Result Value Ref Range    aPTT 43.1 (H) 24.7 - 39.8 SEC       Assessment:     Principal Problem:    CAD (coronary artery disease) (1/10/2020)    Active Problems:     Aortic valve stenosis (1/10/2020)      Weaning from respirator ([facility] [address] (1/10/2020)      Hypoxemia (1/10/2020)      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

## 2020-01-13 NOTE — PROCEDURES
Brief Cardiac Procedure Note    Patient: Immanuel Gloria MRN: [id number]  SSN: [id number]    YOB: 1945  Age: 69 y.o. Sex: male      Date of Procedure: 1/13/2020     Pre-procedure Diagnosis: hypotension/shock    Post-procedure Diagnosis: Normal LV function. Normal AVR. No effusion. Procedure: Transesophageal Echocardiogram    Brief Description of Procedure: As above    Performed By: Patricia Moore MD     Assistants: None    Anesthesia: Moderate Sedation    Estimated Blood Loss: Less than 10 mL      Specimens: None    Implants: None    Findings: Normal LV function. Paradoxical septal motion. Mild RV dysfunction. No effusion. Normal pulmonary vein flow. Normal function of AVR. Complications: None    Recommendations: Continue medical therapy.     Signed By: Patricia Moore MD     January 13, 2020

## 2020-01-13 NOTE — PROGRESS NOTES
Cardiovascular ICU Progress Note: 1/13/2020  Jovani Hess  Admission Date: 1/10/2020     Patient had CABG x 3 and AVR . Slow to improve. Had small ptx that self resolved. Progressive renal failure on HD now. Sedated on Vent. The patient's chart is reviewed and the patient is discussed with the staff. Subjective:      Currently sedated on Fentanyl and Versed. On CRRT still now about 14-15 hour. AFIB at 5 PM yesterday and started Amiodarone. Started abx yesterday and still with fevers. Review of Systems  Review of systems not obtained due to patient factors. -- sedated and unresponsive. Allergies   Allergen Reactions    Amoxicillin Rash    Keflex [Cephalexin] Rash    Pcn [Penicillins] Other (comments)     \"felt closed in\".   No rash       Current Facility-Administered Medications   Medication Dose Route Frequency    NOREPINephrine (LEVOPHED) 8,000 mcg in dextrose 5% 250 mL infusion  0.05-0.2 mcg/kg/min IntraVENous TITRATE    metroNIDAZOLE (FLAGYL) IVPB premix 500 mg  500 mg IntraVENous Q12H    Vancomycin Intermittent Dosing Placeholder   Other Rx Dosing/Monitoring    heparin (porcine) 1,000 unit/mL injection 1,000 Units  1,000 Units Hemodialysis DIALYSIS PRN    aztreonam (AZACTAM) 2 g in 0.9% sodium chloride (MBP/ADV) 100 mL  2 g IntraVENous Q12H    amiodarone (CORDARONE) 450 mg in dextrose 5% 250 mL infusion  0.5-1 mg/min IntraVENous TITRATE    albuterol (PROVENTIL VENTOLIN) nebulizer solution 2.5 mg  2.5 mg Nebulization QID PRN    fentaNYL in normal saline (pf) 25 mcg/mL infusion  0-200 mcg/hr IntraVENous TITRATE    furosemide (LASIX) 100 mg in 0.9% sodium chloride 100 mL infusion  20 mg/hr IntraVENous CONTINUOUS    midazolam (VERSED) 100 mg in 0.9% sodium chloride 100 mL infusion  0-10 mg/hr IntraVENous TITRATE    acetaminophen (TYLENOL) solution 650 mg  650 mg Per NG tube Q4H PRN    0.9% sodium chloride infusion  25 mL/hr IntraVENous CONTINUOUS    sodium chloride (NS) flush 5-40 mL  5-40 mL IntraVENous Q8H    sodium chloride (NS) flush 5-40 mL  5-40 mL IntraVENous PRN    oxyCODONE-acetaminophen (PERCOCET) 5-325 mg per tablet 1 Tab  1 Tab Oral Q4H PRN    morphine 10 mg/ml injection 3 mg  3 mg IntraVENous Q1H PRN    naloxone (NARCAN) injection 0.4 mg  0.4 mg IntraVENous PRN    mupirocin (BACTROBAN) 2 % ointment   Both Nostrils BID    EPINEPHrine (ADRENALIN) 4 mg in 0.9% sodium chloride 250 mL infusion  0.04-0.1 mcg/kg/min IntraVENous TITRATE    nitroglycerin (Tridil) 200 mcg/ml infusion   mcg/min IntraVENous TITRATE    [Held by provider] amiodarone (CORDARONE) tablet 200 mg  200 mg Oral Q12H    [Held by provider] metoprolol tartrate (LOPRESSOR) tablet 25 mg  25 mg Oral Q12H    atorvastatin (LIPITOR) tablet 80 mg  80 mg Oral QHS    insulin regular (NOVOLIN R, HUMULIN R) 100 Units in 0.9% sodium chloride 100 mL infusion  1 Units/hr IntraVENous TITRATE    dextrose (D50W) injection syrg 12.5 g  25 mL IntraVENous PRN    aspirin chewable tablet 81 mg  81 mg Oral DAILY    magnesium sulfate 1 g/100 ml IVPB (premix or compounded)  1 g IntraVENous PRN    midazolam (VERSED) injection 1 mg  1 mg IntraVENous Q1H PRN    propofol (DIPRIVAN) 10 mg/mL infusion  0-50 mcg/kg/min IntraVENous TITRATE    chlorhexidine (PERIDEX) 0.12 % mouthwash 10 mL  10 mL Oral BID    morphine 10 mg/ml injection 4 mg  4 mg IntraVENous Q1H PRN    Or    morphine 10 mg/ml injection 5 mg  5 mg IntraVENous Q1H PRN    dexmedeTOMidine (PRECEDEX) 400 mcg in 0.9% sodium chloride 100 mL infusion  0.2-0.7 mcg/kg/hr IntraVENous TITRATE    dextrose 5 % - 0.45% NaCl infusion  25 mL/hr IntraVENous CONTINUOUS    vasopressin (VASOSTRICT) 20 Units in 0.9% sodium chloride 100 mL infusion  0-0.1 Units/min IntraVENous TITRATE    PHENYLephrine (PF)(LEWIS-SYNEPHRINE) 30 mg in 0.9% sodium chloride 250 mL infusion   mcg/min IntraVENous TITRATE    sodium bicarbonate (8.4%) injection 50 mEq  50 mEq IntraVENous PRN    famotidine (PF) (PEPCID) 20 mg in 0.9% sodium chloride 10 mL injection  20 mg IntraVENous DAILY    aminocaproic acid (AMICAR) 5 g in 0.9% sodium chloride 250 mL infusion  1 g/hr IntraVENous CONTINUOUS         Objective:     Vitals:    01/12/20 2300 01/12/20 2315 01/12/20 2330 01/12/20 2345   BP:       Pulse: (!) 122 (!) 118 (!) 111 (!) 113   Resp: 11 11 8 (!) 4   Temp:       SpO2: 100% 100% 100% 100%   Weight:       Height:         Blood pressure 103/57, pulse (!) 113, temperature (!) 100.5 °F (38.1 °C), resp. rate (!) 4, height 5' 10\" (1.778 m), weight 322 lb 8.5 oz (146.3 kg), SpO2 100 %. Intake and Output:   01/11 0701 - 01/12 1900  In: 9881.1 [I.V.:5891.1]  Out: 1445 [Banner:5056]  01/12 1901 - 01/13 0700  In: 100   Out: 807     Physical Exam:          Constitutional:  Sedated, orally intubated and mechanically ventilated. EENMT:  Sclera clear, pupils equal, oral mucosa moist and orally intubated  Respiratory: clearer today with b/l breath sounds. Cardiovascular:  RRR with no M,G,R;  Gastrointestinal:  soft; no bowel sounds present  Musculoskeletal:  warm with no cyanosis, no lower extremity edema. [place] site  leg with ace wrap. SKIN:  no jaundice or ecchymosis   Neurologic:   no gross neuro deficits  Psychiatric:  Sedated and comfortable. CXR:  Pending      Yesterday  Mild congestion in R > left chest with ETT in place          LINES:  ETT, brown, swan sherri, arterial line, chest tubes times 2 in epigastric area without air leak.     DRIPS:  Levophed,Vaso, epi, versed, fentanyl,  Insulin, amiodarone    CI:  2.1    Ventilator Settings  Mode FIO2 Rate Tidal Volume Pressure PEEP   PRVC  60 %    500 ml  10 cm H2O  8 cm H20      Peak airway pressure: 29.9 cm H2O   Minute ventilation: 11.8 l/min     ABG:   Recent Labs     01/12/20 2034 01/12/20 0200 01/12/20  0101   PHI 7.407 7.368 7.212*   PCO2I 48.1* 37.5 53.5*   PO2I 193* 77 96   HCO3I 30.3* 21.6* 21.5*        LAB  Recent Labs     01/12/20  0112 01/11/20  1015 01/11/20  0256 01/10/20  2141  01/10/20  1429   WBC 21.3*  --  10.1  --   --  16.9*   HGB 8.9* 8.3* 8.3* 8.9*   < > 9.0*   HCT 28.2* 25.9* 25.4* 27.5*   < > 28.5*   *  --  115*  --   --  98*   INR 1.4  --   --   --   --  1.6    < > = values in this interval not displayed. Recent Labs     01/12/20  1833 01/12/20  0842 01/12/20  0112  01/11/20  1255   NA  --  142 139  --  143   K 4.2 4.7 6.5*   < > 5.6*   CL  --  110* 110*  --  113*   CO2  --  26 23  --  24   GLU  --  109* 160*  --  119*   BUN  --  46* 43*  --  38*   CREA  --  3.63* 3.51*  --  3.10*   MG 2.0 2.3  --   --  2.6*   PHOS  --  4.2*  --   --   --    CA  --  8.4 7.8*  --  8.0*   ALB  --  2.9* 3.1*  --   --    SGOT  --   --  64*  --   --     < > = values in this interval not displayed. Assessment and Plan :  (Medical Decision Making)     Hospital Problems  Date Reviewed: 12/6/2019          Codes Class Noted POA    Aortic valve stenosis ICD-10-CM: I35.0  ICD-9-CM: 424.1  1/10/2020 Unknown        * (Principal) CAD (coronary artery disease) ICD-10-CM: I25.10  ICD-9-CM: 414.00  1/10/2020 Unknown        S/P CABG x 3 ICD-10-CM: Z95.1  ICD-9-CM: V45.81  1/10/2020 Unknown        S/P AVR ICD-10-CM: Z95.2  ICD-9-CM: V43.3  1/10/2020 Unknown        Weaning from respirator [facility]) ICD-10-CM: Z99.11  ICD-9-CM: V46.13  1/10/2020 Unknown        Metabolic acidosis RHR-00-IO: E87.2  ICD-9-CM: 276.2  1/11/2020 Unknown        Acute renal failure (ARF) (HCC) ICD-10-CM: N17.9  ICD-9-CM: 584.9  1/11/2020 Unknown        Hyperkalemia ICD-10-CM: E87.5  ICD-9-CM: 276.7  1/11/2020 Unknown        Hypoxemia ICD-10-CM: R09.02  ICD-9-CM: 799.02  1/10/2020 Unknown              69  y old morbidly obese s/p AVR and CABG x3. Still hypotensive but now on 3 pressors and acidotic. Cr was getting progressively worse. Recent x-ray did have small Right sided PTX and resolved. On ABX given fevers. On CRRT now.     Plan:     --continue vent support and ABG noted with pH now at 7.4  --continue CRRT and f/u with renal.   --Bronchodilators per protocol.  --wean slowly -- not ready at this time  --continue sedation for now -- not a candidate at this time for sedation holiday. --started abx d2. cultures are negative. Still with fevers. --f/u labs and cxr in AM  --continue remaining treatment per PMD.      More than 50% of the time documented was spent in face-to-face contact with the patient and in the care of the patient on the floor/unit where the patient is located.               David Harris MD

## 2020-01-13 NOTE — CONSULTS
[facility] Cardiology Consult                Date of  Admission: 1/10/2020  4:59 AM     Primary Care Physician: Dr. Kirsty West  Primary Cardiologist: Dr. Clare Haynes  Referring Physician: Dr. [facility] Physician: Dr. Clare Haynes     CC/Reason for consult: A. Fib       Beth Christianson is a 69 y.o. male with prior h/o CAD with recent [facility] 12/31/19 showing severe multivessel coronary artery disease in the setting of known severe aortic stenosis. Patient was seen by CTS and set up for surgery on 1/10/2020. Patient underwent CABG x3 (LIMA>LAD, SVG>OM, SVG>PDA) and aortic valve replacement with a 25-mm Intuity Jon-Gutierres valve. He became hypoxic, hypotensive and hard to ventilate. Pulmonary seeing patient and he remains intubated and sedated. He is also on pressors. Nephrology saw patient for worsening renal failure and CRRT was started on 1/12/2020. The patient went into A. Fib last pm and was started on IV amiodarone. He remains with fevers and started on abx yesterday. Cardiology was consulted today for A. Fib. He remains with epicardial pacer wires and remains on pressors.          Diagnosis    DM type 2 (diabetes mellitus, type 2) ([facility])    Gout    HTN (hypertension)    BPH (benign prostatic hyperplasia)    Seasonal allergic rhinitis    HLD (hyperlipidemia)    Nonrheumatic aortic valve stenosis    Obesity, morbid (HCC)    Type 2 diabetes with nephropathy ([facility])    Bilateral carotid artery stenosis    Aortic valve stenosis    CAD (coronary artery disease)    Weaning from respirator ([facility])    Hypoxemia    S/P CABG x 3    S/P AVR    Metabolic acidosis    Acute renal failure (ARF) ([facility])    Hyperkalemia       Past Medical History:   Diagnosis Date    Arthritis     BPH (benign prostatic hyperplasia) 1/14/2013    CAD (coronary artery disease)     DM type 2 (diabetes mellitus, type 2) ([facility] [address] dx 2004    Type 2, avg fbs , recognizes hypo at 67, last HA1C was 9.2 on 5/2014    Dyspnea     at times, Uses Albuterol inhaler when needed (last used first of aug 2014)    Gout 1/14/2013    HLD (hyperlipidemia) 1/14/2013    HTN (hypertension)     controlled with meds    Morbid obesity ([address] 9/3/14    BMI 41.7    Psychiatric disorder     anxiety, controlled with buspar    Rheumatic fever     as a child    Seasonal allergic rhinitis     treated with Allegra    Severe aortic valve stenosis     echo 09/11/19 EF 60-65%- mild to moderate regurgitation    Thyroid disease     hx- no longer takes synthroid    Unspecified sleep apnea 2016    bi pap      Past Surgical History:   Procedure Laterality Date    HX CATARACT REMOVAL Bilateral 2012    HX HEART CATHETERIZATION  12/23/2019    HX ORTHOPAEDIC Left     carpal tunnel    HX TONSIL AND ADENOIDECTOMY       Allergies   Allergen Reactions    Amoxicillin Rash    Keflex [Cephalexin] Rash    Pcn [Penicillins] Other (comments)     \"felt closed in\".   No rash      Family History   Problem Relation Age of Onset    Lung Disease Mother         copd    Cancer Father         lympnode cancer    No Known Problems Brother     Cancer Other         fmh lymphoma    Diabetes Other         fmhx    Diabetes Maternal Grandfather         Current Facility-Administered Medications   Medication Dose Route Frequency    0.9% sodium chloride infusion 250 mL  250 mL IntraVENous PRN    NOREPINephrine (LEVOPHED) 8,000 mcg in dextrose 5% 250 mL infusion  0.05-0.2 mcg/kg/min IntraVENous TITRATE    metroNIDAZOLE (FLAGYL) IVPB premix 500 mg  500 mg IntraVENous Q12H    Vancomycin Intermittent Dosing Placeholder   Other Rx Dosing/Monitoring    heparin (porcine) 1,000 unit/mL injection 1,000 Units  1,000 Units Hemodialysis DIALYSIS PRN    aztreonam (AZACTAM) 2 g in 0.9% sodium chloride (MBP/ADV) 100 mL  2 g IntraVENous Q12H    amiodarone (CORDARONE) 450 mg in dextrose 5% 250 mL infusion  0.5-1 mg/min IntraVENous TITRATE    albuterol (PROVENTIL VENTOLIN) nebulizer solution 2.5 mg 2.5 mg Nebulization QID PRN    fentaNYL in normal saline (pf) 25 mcg/mL infusion  0-200 mcg/hr IntraVENous TITRATE    furosemide (LASIX) 100 mg in 0.9% sodium chloride 100 mL infusion  20 mg/hr IntraVENous CONTINUOUS    midazolam (VERSED) 100 mg in 0.9% sodium chloride 100 mL infusion  0-10 mg/hr IntraVENous TITRATE    acetaminophen (TYLENOL) solution 650 mg  650 mg Per NG tube Q4H PRN    0.9% sodium chloride infusion  25 mL/hr IntraVENous CONTINUOUS    sodium chloride (NS) flush 5-40 mL  5-40 mL IntraVENous Q8H    sodium chloride (NS) flush 5-40 mL  5-40 mL IntraVENous PRN    oxyCODONE-acetaminophen (PERCOCET) 5-325 mg per tablet 1 Tab  1 Tab Oral Q4H PRN    morphine 10 mg/ml injection 3 mg  3 mg IntraVENous Q1H PRN    naloxone (NARCAN) injection 0.4 mg  0.4 mg IntraVENous PRN    mupirocin (BACTROBAN) 2 % ointment   Both Nostrils BID    EPINEPHrine (ADRENALIN) 4 mg in 0.9% sodium chloride 250 mL infusion  0.04-0.1 mcg/kg/min IntraVENous TITRATE    nitroglycerin (Tridil) 200 mcg/ml infusion   mcg/min IntraVENous TITRATE    [Held by provider] amiodarone (CORDARONE) tablet 200 mg  200 mg Oral Q12H    [Held by provider] metoprolol tartrate (LOPRESSOR) tablet 25 mg  25 mg Oral Q12H    atorvastatin (LIPITOR) tablet 80 mg  80 mg Oral QHS    insulin regular (NOVOLIN R, HUMULIN R) 100 Units in 0.9% sodium chloride 100 mL infusion  1 Units/hr IntraVENous TITRATE    dextrose (D50W) injection syrg 12.5 g  25 mL IntraVENous PRN    aspirin chewable tablet 81 mg  81 mg Oral DAILY    magnesium sulfate 1 g/100 ml IVPB (premix or compounded)  1 g IntraVENous PRN    midazolam (VERSED) injection 1 mg  1 mg IntraVENous Q1H PRN    propofol (DIPRIVAN) 10 mg/mL infusion  0-50 mcg/kg/min IntraVENous TITRATE    chlorhexidine (PERIDEX) 0.12 % mouthwash 10 mL  10 mL Oral BID    morphine 10 mg/ml injection 4 mg  4 mg IntraVENous Q1H PRN    Or    morphine 10 mg/ml injection 5 mg  5 mg IntraVENous Q1H PRN    dexmedeTOMidine (PRECEDEX) 400 mcg in 0.9% sodium chloride 100 mL infusion  0.2-0.7 mcg/kg/hr IntraVENous TITRATE    dextrose 5 % - 0.45% NaCl infusion  25 mL/hr IntraVENous CONTINUOUS    vasopressin (VASOSTRICT) 20 Units in 0.9% sodium chloride 100 mL infusion  0-0.1 Units/min IntraVENous TITRATE    PHENYLephrine (PF)(LEWIS-SYNEPHRINE) 30 mg in 0.9% sodium chloride 250 mL infusion   mcg/min IntraVENous TITRATE    sodium bicarbonate (8.4%) injection 50 mEq  50 mEq IntraVENous PRN    famotidine (PF) (PEPCID) 20 mg in 0.9% sodium chloride 10 mL injection  20 mg IntraVENous DAILY    aminocaproic acid (AMICAR) 5 g in 0.9% sodium chloride 250 mL infusion  1 g/hr IntraVENous CONTINUOUS     Unable to obtain, sedated and on vent  ROS     Physical Exam  Vitals:    01/13/20 0730 01/13/20 0745 01/13/20 0800 01/13/20 0818   BP:       Pulse: (!) 101 94 (!) 101 (!) 103   Resp: 25 19 20 25   Temp:   99.9 °F (37.7 °C)    SpO2: 100% 100% 100% 100%   Weight:       Height:           Physical Exam:  General: sedated on vent   HEENT: head normocephalic   Neck: supple no carotid bruits  Heart: S1S2 irregular irregular with RRR without murmurs or gallops  Lungs: diminished in bases   Abd: soft, large with good bowel sounds  Ext: warm, trace edema, calves supple/nontender, pulses 2+ bilaterally  Skin: warm and dry  Psychiatric: sedated  Neurologic: sedated on vent     Cardiographics    Telemetry: A. Fib   ECG: V pacing with underlying A.  Fib controlled rate   Echocardiogram: ordered     Labs:   Recent Labs     01/13/20  0201 01/13/20  0200 01/12/20  1833 01/12/20  0842 01/12/20  0112  01/10/20  1429     --   --  142 139   < > 142   K 3.9  --  4.2 4.7 6.5*   < > 5.0   MG 1.8  --  2.0 2.3  --    < > 2.6*   BUN 11  --   --  46* 43*   < > 29*   CREA 1.44  --   --  3.63* 3.51*   < > 2.23*   *  --   --  109* 160*   < > 117*   WBC  --  17.0*  --   --  21.3*   < > 16.9*   HGB  --  8.0*  --   --  8.9*   < > 9.0*   HCT  -- 25.1*  --   --  28.2*   < > 28.5*   PLT  --  84*  --   --  105*   < > 98*   INR  --   --   --   --  1.4  --  1.6    < > = values in this interval not displayed. Assessment/Plan:     Assessment:      Principal Problem:    CAD (coronary artery disease) (1/10/2020)- s/p CABG x3 (LIMA>LAD, SVG>OM, SVG>PDA) and aortic valve replacement with a 25-mm Intuity Jon-Gutierres valve on 1/10/2020. Continue ASA and statin, BB on hold d/t hypotension and no ACE/ARB with worsening renal failure. Stat echo. Active Problems: Aortic valve stenosis (1/10/2020)- see above    Weaning from respirator ([address] (1/10/2020)      Hypoxemia (1/10/2020)      S/P CABG x 3 (1/10/2020)      S/P AVR (6/29/3157)      Metabolic acidosis (6/75/5594)      Acute renal failure (ARF) ([address] (1/11/2020)- improved s/p CRRT      Hyperkalemia (1/11/2020)- improved s/p CRRT      Atrial fibrillation ([address] (1/13/2020)- likely triggered by acute issues and s/p CABG. Will continue IV amiodarone. No AC needs at this time (<24 hrs) and also with anemia. Hypotension- remains on three pressors; stat echo. Thank you very much for this referral. We appreciate the opportunity to participate in this patient's care. We will follow along with above stated plan.     Gladys Anaya NP  Consulting MD: Dr. Clare Haynes

## 2020-01-13 NOTE — PROGRESS NOTES
Dr Kaylee Ventura updated on patient status and continued afib.   Order received to re-bolus patient with amiodarone

## 2020-01-13 NOTE — PROGRESS NOTES
Labs noted with WBC coming down. Hg lower but still in 8 range. platelts lower and f/u. ABG fine this AM. cxr also slightly less congested with ETT in place. Albin Rincon MD      LABS    Recent Labs     01/13/20  0200 01/12/20  0112 01/11/20  1015 01/11/20  0256   WBC 17.0* 21.3*  --  10.1   HGB 8.0* 8.9* 8.3* 8.3*   HCT 25.1* 28.2* 25.9* 25.4*   PLT 84* 105*  --  115*     Recent Labs     01/13/20  0201 01/12/20  1833 01/12/20  0842 01/12/20  0112  01/10/20  1429     --  142 139   < > 142   K 3.9 4.2 4.7 6.5*   < > 5.0     --  110* 110*   < > 114*   *  --  109* 160*   < > 117*   CO2 30  --  26 23   < > 22   BUN 11  --  46* 43*   < > 29*   CREA 1.44  --  3.63* 3.51*   < > 2.23*   MG 1.8 2.0 2.3  --    < > 2.6*   PHOS 3.1  --  4.2*  --   --   --    INR  --   --   --  1.4  --  1.6    < > = values in this interval not displayed. Recent Labs     01/13/20  0328 01/12/20 2034 01/12/20  0200   PHI 7.443 7.407 7.368   PCO2I 42.2 48.1* 37.5   PO2I 115* 193* 77   HCO3I 28.9* 30.3* 21.6*     No results for input(s): LCAD, LAC in the last 72 hours.

## 2020-01-14 NOTE — PROGRESS NOTES
BSR given to incoming RN Dennis Hewitt.. reviewed medications, drips, am labs and events of the night.

## 2020-01-14 NOTE — PROGRESS NOTES
crrt treatment stopped abruptly. The dialysis machine lost power spontaneously. Letha dialysis rn notified.

## 2020-01-14 NOTE — PROGRESS NOTES
POD 4 Days Post-Op    Procedure:  Procedure(s):  CORONARY ARTERY BYPASS GRAFT WITH AVR/ (CABG X 3 )/ LIMA  VEIN HARVEST/ GREATER SAPHENOUS  ESOPHAGEAL TRANS ECHOCARDIOGRAM      Subjective:     Sedated on vent      Objective:     Patient Vitals for the past 8 hrs:   BP Temp Pulse Resp SpO2 Weight   20 0819 105/59  90 25 100 %    20 0815   92 25 100 %    20 0814 98/57  88 25 100 %    20 0800   88 25 100 %    20 0759 103/58  85 25 100 %    20 0745   87 25 100 %    20 0730   92 25 100 %    20 0715   92 25 100 %    20 0700  (!) 101 °F (38.3 °C) 95 25 100 %    20 0610      337 lb 4.9 oz (153 kg)   20 0600   98 25 100 %    20 0545   82 25 100 %    20 0530   82 25 100 %    20 0515   73 25 100 %    20 0500   91 25 100 %    20 0445   86 25 100 %    20 0430   88 25 100 %    20 0415   87 25 100 %    20 0412   88 25 100 %    20 0400   89 25 100 %    20 0345   90 25 100 %    20 0330   94 25 100 %    20 0315   87 (!) 56 100 %    20 0314   91 25 100 %    20 0300   90 25 100 %    20 0258  (!) 100.6 °F (38.1 °C) 88 25 100 %    20 0245   85  100 %    20 0230   91 25 100 %    20 0215   86 25 100 %    20 0200   88 25 100 %    20 0145   88 25 100 %    20 0144   87 25 100 %    20 0130   84 25 100 %    20 0115   87 25     20 0114   87 25     20 0101   85 25     20 0100   88 25     20 0045   86 25       Temp (24hrs), Av.6 °F (38.1 °C), Min:100 °F (37.8 °C), Max:101.5 °F (38.6 °C)        Hemodynamics  PAP Systolic: 44 PAP  CO (l/min): 5.6 l/min CO  CI (l/min/m2): 2.3 l/min/m2 CI    701 - 1900  In: -   Out: 38 [Urine:8]  1901 - 700  In: 6488.6 [I.V.:5678.6]  Out: 2988 [Urine:235]    CT Drainage              total of all CT's    Heart:  regular rate and rhythm, S1, S2 normal, no murmur, click, rub or gallop  Lung:  clear to auscultation bilaterally  Neuro: Grossly non focal  Incisions: Clean, dry, and intact    Labs:  Recent Results (from the past 24 hour(s))   GLUCOSE, POC    Collection Time: 01/13/20  9:18 AM   Result Value Ref Range    Glucose (POC) 132 (H) 65 - 100 mg/dL   GLUCOSE, POC    Collection Time: 01/13/20 10:09 AM   Result Value Ref Range    Glucose (POC) 139 (H) 65 - 100 mg/dL   GLUCOSE, POC    Collection Time: 01/13/20 11:13 AM   Result Value Ref Range    Glucose (POC) 129 (H) 65 - 100 mg/dL   VANCOMYCIN, RANDOM    Collection Time: 01/13/20 11:46 AM   Result Value Ref Range    Vancomycin, random 8.8 UG/ML   PTT    Collection Time: 01/13/20 11:46 AM   Result Value Ref Range    aPTT 49.6 (H) 24.7 - 39.8 SEC   GLUCOSE, POC    Collection Time: 01/13/20 11:51 AM   Result Value Ref Range    Glucose (POC) 122 (H) 65 - 100 mg/dL   GLUCOSE, POC    Collection Time: 01/13/20  1:10 PM   Result Value Ref Range    Glucose (POC) 116 (H) 65 - 100 mg/dL   GLUCOSE, POC    Collection Time: 01/13/20  4:19 PM   Result Value Ref Range    Glucose (POC) 107 (H) 65 - 100 mg/dL   GLUCOSE, POC    Collection Time: 01/13/20  5:49 PM   Result Value Ref Range    Glucose (POC) 101 (H) 65 - 100 mg/dL   GLUCOSE, POC    Collection Time: 01/13/20  8:01 PM   Result Value Ref Range    Glucose (POC) 121 (H) 65 - 100 mg/dL   PLEASE READ & DOCUMENT PPD TEST IN 72 HRS    Collection Time: 01/13/20  9:00 PM   Result Value Ref Range    PPD Negative Negative    mm Induration 0 0 - 5 mm   GLUCOSE, POC    Collection Time: 01/13/20  9:32 PM   Result Value Ref Range    Glucose (POC) 130 (H) 65 - 100 mg/dL   GLUCOSE, POC    Collection Time: 01/13/20 10:41 PM   Result Value Ref Range    Glucose (POC) 127 (H) 65 - 100 mg/dL   GLUCOSE, POC    Collection Time: 01/14/20 12:17 AM   Result Value Ref Range    Glucose (POC) 122 (H) 65 - 100 mg/dL   GLUCOSE, POC    Collection Time: 01/14/20  2:37 AM   Result Value Ref Range    Glucose (POC) 127 (H) 65 - 100 mg/dL   RENAL FUNCTION PANEL    Collection Time: 01/14/20  3:44 AM   Result Value Ref Range    Sodium 136 136 - 145 mmol/L    Potassium 3.4 (L) 3.5 - 5.1 mmol/L    Chloride 101 98 - 107 mmol/L    CO2 28 21 - 32 mmol/L    Anion gap 7 7 - 16 mmol/L    Glucose 125 (H) 65 - 100 mg/dL    BUN 18 8 - 23 MG/DL    Creatinine 2.23 (H) 0.8 - 1.5 MG/DL    GFR est AA 37 (L) >60 ml/min/1.73m2    GFR est non-AA 31 (L) >60 ml/min/1.73m2    Calcium 8.0 (L) 8.3 - 10.4 MG/DL    Phosphorus 3.2 2.3 - 3.7 MG/DL    Albumin 2.2 (L) 3.2 - 4.6 g/dL   CBC WITH AUTOMATED DIFF    Collection Time: 01/14/20  3:44 AM   Result Value Ref Range    WBC 10.7 4.3 - 11.1 K/uL    RBC 2.86 (L) 4.23 - 5.6 M/uL    HGB 8.9 (L) 13.6 - 17.2 g/dL    HCT 27.8 (L) 41.1 - 50.3 %    MCV 97.2 79.6 - 97.8 FL    MCH 31.1 26.1 - 32.9 PG    MCHC 32.0 31.4 - 35.0 g/dL    RDW 14.7 (H) 11.9 - 14.6 %    PLATELET 626 (L) 086 - 450 K/uL    MPV 10.8 9.4 - 12.3 FL    ABSOLUTE NRBC 0.03 0.0 - 0.2 K/uL    DF AUTOMATED      NEUTROPHILS 75 43 - 78 %    LYMPHOCYTES 14 13 - 44 %    MONOCYTES 8 4.0 - 12.0 %    EOSINOPHILS 2 0.5 - 7.8 %    BASOPHILS 1 0.0 - 2.0 %    IMMATURE GRANULOCYTES 1 0.0 - 5.0 %    ABS. NEUTROPHILS 8.1 1.7 - 8.2 K/UL    ABS. LYMPHOCYTES 1.5 0.5 - 4.6 K/UL    ABS. MONOCYTES 0.8 0.1 - 1.3 K/UL    ABS. EOSINOPHILS 0.2 0.0 - 0.8 K/UL    ABS. BASOPHILS 0.1 0.0 - 0.2 K/UL    ABS. IMM.  GRANS. 0.1 0.0 - 0.5 K/UL   MAGNESIUM    Collection Time: 01/14/20  3:44 AM   Result Value Ref Range    Magnesium 2.0 1.8 - 2.4 mg/dL   GLUCOSE, POC    Collection Time: 01/14/20  3:49 AM   Result Value Ref Range    Glucose (POC) 124 (H) 65 - 100 mg/dL   GLUCOSE, POC    Collection Time: 01/14/20  4:48 AM   Result Value Ref Range    Glucose (POC) 122 (H) 65 - 100 mg/dL   POC G3    Collection Time: 01/14/20  4:53 AM   Result Value Ref Range    Device: VENT FIO2 (POC) 60 %    pH (POC) 7.433 7.35 - 7.45      pCO2 (POC) 41.0 35 - 45 MMHG    pO2 (POC) 115 (H) 75 - 100 MMHG    HCO3 (POC) 27.4 (H) 22 - 26 MMOL/L    sO2 (POC) 99 (H) 95 - 98 %    Base excess (POC) 3 mmol/L    Mode Pressure regulated volume control      Tidal volume 500 ml    Set Rate 25 bpm    PEEP/CPAP (POC) 10 cmH2O    Allens test (POC) NOT APPLICABLE      Site DRAWN FROM ARTERIAL LINE      Specimen type (POC) ARTERIAL      Performed by [email or web address]     CO2, POC 29 MMOL/L    Respiratory comment: NurseNotified     Exhaled minute volume 12.00 L/min   POC VENOUS BLOOD GAS    Collection Time: 01/14/20  4:59 AM   Result Value Ref Range    Device: VENT      FIO2 (POC) 60 %    pH, venous (POC) 7.388 7.32 - 7.42      pCO2, venous (POC) 45.7 41 - 51 MMHG    pO2, venous (POC) 27 (LL) mmHg    HCO3, venous (POC) 27.5 23 - 28 MMOL/L    sO2, venous (POC) 48 (L) 65 - 88 %    Base excess, venous (POC) 2 mmol/L    Mode Pressure regulated volume control      Tidal volume 500 ml    Set Rate 25 bpm    PEEP/CPAP (POC) 10 cmH2O    Allens test (POC) NOT APPLICABLE      Site SWAN JEANNE      Specimen type (POC) VENOUS BLOOD      Performed by DavisConnorRRT     CO2, POC 29 MMOL/L    Respiratory comment: NurseNotified     Exhaled minute volume 12.00 L/min    COLLECT TIME 458     GLUCOSE, POC    Collection Time: 01/14/20  6:37 AM   Result Value Ref Range    Glucose (POC) 113 (H) 65 - 100 mg/dL   GLUCOSE, POC    Collection Time: 01/14/20  7:54 AM   Result Value Ref Range    Glucose (POC) 113 (H) 65 - 100 mg/dL       Assessment:     Principal Problem:    CAD (coronary artery disease) (1/10/2020)    Active Problems:     Aortic valve stenosis (1/10/2020)      Weaning from respirator ([facility] [address] (1/10/2020)      Hypoxemia (1/10/2020)      S/P CABG x 3 (1/10/2020)      S/P AVR (9/75/5409)      Metabolic acidosis (7/82/3353)      Acute renal failure (ARF) ([address] (1/11/2020)      Hyperkalemia (1/11/2020)      Atrial fibrillation ([address] (1/13/2020)        Plan/Recommendations/Medical Decision Making:     Off vasopressin but remains vasoplegic, will try methylene blue, supportive cuate, TERRI yesterday normal heart function    See orders

## 2020-01-14 NOTE — DIALYSIS
CRRT stopped d/t machine malfuntioning. New  with previous MD orders.    Heparin continues at 1000 units/ml/hr. CRRT treatment resumed without problems. Dialysis nurse available as needed.

## 2020-01-14 NOTE — DIALYSIS
72 HR SLED initiated using  Left CVC. Aspirated and flushed both catheter ports without problem. Machine settings per MD order. 150  DFR  250 UFR Heparin 2000 unit bolus and 1000 ml/hr. Reported to primary nurse. Dialysis nurse available as needed.

## 2020-01-14 NOTE — PROGRESS NOTES
[facility] Nephrology        Subjective: A on CKD3   Intubated, sedated     Review of Systems -     Can not be obtained due to pt's condition. Objective:    Vitals:    01/14/20 0545 01/14/20 0600 01/14/20 0610 01/14/20 0700   BP:       Pulse: 82 98  95   Resp: 25 25  25   Temp:    (!) 101 °F (38.3 °C)   SpO2: 100% 100%  100%   Weight:   153 kg (337 lb 4.9 oz)    Height:           PE  Gen: intubated, multiple pressors. .  CV:reg rate  Chest:bilat breath sounds  Abd: soft  Ext/Access: Left Subclavian Temp HD cath      . LAB  Recent Labs     01/14/20  0344 01/13/20  0200 01/12/20  0112   WBC 10.7 17.0* 21.3*   HGB 8.9* 8.0* 8.9*   HCT 27.8* 25.1* 28.2*   * 84* 105*   INR  --   --  1.4     Recent Labs     01/14/20  0344 01/13/20  0201 01/12/20  1833 01/12/20  0842 01/12/20  0112    136  --  142 139   K 3.4* 3.9 4.2 4.7 6.5*    100  --  110* 110*   CO2 28 30  --  26 23   * 110*  --  109* 160*   BUN 18 11  --  46* 43*   CREA 2.23* 1.44  --  3.63* 3.51*   MG 2.0 1.8 2.0 2.3  --    PHOS 3.2 3.1  --  4.2*  --    CA 8.0* 7.9*  --  8.4 7.8*   ALB 2.2* 2.5*  --  2.9* 3.1*   TBILI  --   --   --   --  0.4   ALT  --   --   --   --  10*   SGOT  --   --   --   --  59*           Radiology    A/P:   Patient Active Problem List   Diagnosis Code    DM type 2 (diabetes mellitus, type 2) (HCC) E11.9    Gout M10.9    HTN (hypertension) I10    BPH (benign prostatic hyperplasia) N40.0    Seasonal allergic rhinitis J30.2    HLD (hyperlipidemia) E78.5    Nonrheumatic aortic valve stenosis I35.0    Obesity, morbid ([facility]) E66.01    Type 2 diabetes with nephropathy ([facility]) E11.21    Bilateral carotid artery stenosis I65.23    Aortic valve stenosis I35.0    CAD (coronary artery disease) I25.10    Weaning from respirator ([facility]) Z99.11    Hypoxemia R09.02    S/P CABG x 3 Z95.1    S/P AVR Z19.5    Metabolic acidosis Y02.9    Acute renal failure (ARF) ([facility]) N17.9    Hyperkalemia E87.5    Atrial fibrillation ([address] I48.91     A on CKD - Non-olguric. Renal US unremarkable. Restart CRRT this morning, UF goal 250/hr      ASHD/ AS - s/p CABG  Aortic Valve replacement    DM    HTN/ Volume - UF as tolerated    Resp Failure - on vent.         Tino Long MD

## 2020-01-14 NOTE — PROGRESS NOTES
A follow up visit was made to the patient. Emotional support, spiritual presence and  
prayer were provided for the patient. He was not alert and he was being cared for by his nurse. EDMOND Jansen

## 2020-01-14 NOTE — PROGRESS NOTES
Pharmacokinetic Consult to Pharmacist 
 
Vane Aubree is a 69 y.o. male being treated for sepsis with aztreonam, metronidazole, and vancomycin. Height: 5' 10\" (177.8 cm)  Weight: 153 kg (337 lb 4.9 oz) Lab Results Component Value Date/Time BUN 18 01/14/2020 03:44 AM  
 Creatinine 2.23 (H) 01/14/2020 03:44 AM  
 WBC 10.7 01/14/2020 03:44 AM  
 Procalcitonin 4.23 01/12/2020 01:53 AM  
  
Estimated Creatinine Clearance: 43.8 mL/min (A) (based on SCr of 2.23 mg/dL (H)). Lab Results Component Value Date/Time Vancomycin, random 19.9 01/14/2020 01:48 PM  
 
 
 
Day 3 of vancomycin. Goal trough is 15-20, but dosing intermittently based on random levels due to CRRT. Vancomycin random level resulted at 19.9, so will re-dose with 2000 mg X 1 today. Patient continues on CRRT that was started on 1/12/2020 around 0900. Plan is for 72 hours of CRRT if tolerated/line remains open. Thank you, CHELO Mckeon, PharmD.

## 2020-01-14 NOTE — PROGRESS NOTES
[facility] CARDIOLOGY PROGRESS NOTE 
      
 
1/14/2020 11:12 AM 
 
Admit Date: 1/10/2020 Subjective:  
Patient remains intubated and sedated. ON Levaphed at 25 and Epi at 3. Remains febrile. TERRI yesterday with Normal LV function. Normal AVR. No effusion. Mild RV hypokinesis but no abnormality to account for severe hypotension/shock. Remains in atrial fibrillation with intermittent pacing. ROS:  UNABLE TO OBTAIN DUE TO INABILITY OF PATIENT TO COMMUNICATE SECONDARY TO CURRENT INTUBATION AND NEED FOR MECHANICAL VENTILATION. Objective:  
  
Vitals:  
 01/14/20 1014 01/14/20 1015 01/14/20 1028 01/14/20 1030 BP: 99/52  (!) 89/55 95/53 Pulse: 99 99 100 100 Resp: 24 25 25 25 Temp:    (!) 101.5 °F (38.6 °C) SpO2: 100% 100% 99% 99% Weight:      
Height:      
 
 
Physical Exam: 
General-Intubated. Obese. Neck- supple, no JVD 
CV- Aleene Guile Lung- clear bilaterally Abd- soft, nontender, nondistended Ext- 2+ edema bilaterally. Skin- warm and dry Psychiatric:  Unable to accurately assess due to intubated and sedated status. Neurologic:  Unable to accurately assess due to intubated and sedated status. Data Review:  
Labs:  
Recent Labs  
  01/14/20 
0344 01/13/20 
0201 01/13/20 
0200  01/12/20 
0112  136  --    < > 139  
K 3.4* 3.9  --    < > 6.5* MG 2.0 1.8  --    < >  --   
BUN 18 11  --    < > 43* CREA 2.23* 1.44  --    < > 3.51* * 110*  --    < > 160* WBC 10.7  --  17.0*  --  21.3* HGB 8.9*  --  8.0*  --  8.9* HCT 27.8*  --  25.1*  --  28.2*  
*  --  84*  --  105* INR  --   --   --   --  1.4  
 < > = values in this interval not displayed. No results found for: JENNA Escobedo TELEMETRY:  Atrial fibrillation with LBBB. Assessment/Plan:  
 
Principal Problem: 
  CAD (coronary artery disease) (1/10/2020) S/P multivessel CABG. Critically ill post op. Supportive care. Wean pressors as tolerated. Active Problems: Aortic valve stenosis (1/10/2020) S/P AVR. See above. Weaning from respirator [facility]) (1/10/2020) Slow wean per pulmonary. Hypoxemia (1/10/2020) Continue vent support. Metabolic acidosis (2/19/3201) ON dialysis. Acute renal failure (ARF) ([facility] [address] (1/11/2020) Continue CRRT. Atrial fibrillation ([address] (1/13/2020) IV amiodarone. Not candidate at present for anticoagulation.   
 
 
 
 
 
Lauren Acosta MD 
1/14/2020 11:12 AM

## 2020-01-14 NOTE — PROGRESS NOTES
Noted to be pacing at 40. Had occasional intrinsic beats. Pacer increased to 80. Spoke with Socorro Stein NP made aware.  Instructed to decrease amiodarone to .5 mg done

## 2020-01-14 NOTE — PROGRESS NOTES
Cardiovascular ICU Progress Note: 1/14/2020  [facility]  Admission Date: 1/10/2020     Patient had CABG x 3 and AVR . Slow to improve. Had small ptx that self resolved. Progressive renal failure on HD now. Sedated on Vent. The patient's chart is reviewed and the patient is discussed with the staff. Subjective:     Sedated and intubated still on levophed and epinephrine    Review of Systems  Review of systems not obtained due to patient factors. -- sedated and unresponsive. Allergies   Allergen Reactions    Amoxicillin Rash    Keflex [Cephalexin] Rash    Pcn [Penicillins] Other (comments)     \"felt closed in\".   No rash       Current Facility-Administered Medications   Medication Dose Route Frequency    amiodarone (CORDARONE) 450 mg in dextrose 5% 250 mL infusion  0.5 mg/min IntraVENous CONTINUOUS    0.9% sodium chloride infusion 250 mL  250 mL IntraVENous PRN    NOREPINephrine (LEVOPHED) 8,000 mcg in dextrose 5% 250 mL infusion  0.05-0.2 mcg/kg/min IntraVENous TITRATE    metroNIDAZOLE (FLAGYL) IVPB premix 500 mg  500 mg IntraVENous Q12H    Vancomycin Intermittent Dosing Placeholder   Other Rx Dosing/Monitoring    heparin (porcine) 1,000 unit/mL injection 1,000 Units  1,000 Units Hemodialysis DIALYSIS PRN    aztreonam (AZACTAM) 2 g in 0.9% sodium chloride (MBP/ADV) 100 mL  2 g IntraVENous Q12H    albuterol (PROVENTIL VENTOLIN) nebulizer solution 2.5 mg  2.5 mg Nebulization QID PRN    fentaNYL in normal saline (pf) 25 mcg/mL infusion  0-200 mcg/hr IntraVENous TITRATE    furosemide (LASIX) 100 mg in 0.9% sodium chloride 100 mL infusion  20 mg/hr IntraVENous CONTINUOUS    midazolam (VERSED) 100 mg in 0.9% sodium chloride 100 mL infusion  0-10 mg/hr IntraVENous TITRATE    acetaminophen (TYLENOL) solution 650 mg  650 mg Per NG tube Q4H PRN    0.9% sodium chloride infusion  25 mL/hr IntraVENous CONTINUOUS    sodium chloride (NS) flush 5-40 mL  5-40 mL IntraVENous Q8H  sodium chloride (NS) flush 5-40 mL  5-40 mL IntraVENous PRN    oxyCODONE-acetaminophen (PERCOCET) 5-325 mg per tablet 1 Tab  1 Tab Oral Q4H PRN    morphine 10 mg/ml injection 3 mg  3 mg IntraVENous Q1H PRN    naloxone (NARCAN) injection 0.4 mg  0.4 mg IntraVENous PRN    mupirocin (BACTROBAN) 2 % ointment   Both Nostrils BID    EPINEPHrine (ADRENALIN) 4 mg in 0.9% sodium chloride 250 mL infusion  0.04-0.1 mcg/kg/min IntraVENous TITRATE    nitroglycerin (Tridil) 200 mcg/ml infusion   mcg/min IntraVENous TITRATE    [Held by provider] amiodarone (CORDARONE) tablet 200 mg  200 mg Oral Q12H    [Held by provider] metoprolol tartrate (LOPRESSOR) tablet 25 mg  25 mg Oral Q12H    atorvastatin (LIPITOR) tablet 80 mg  80 mg Oral QHS    insulin regular (NOVOLIN R, HUMULIN R) 100 Units in 0.9% sodium chloride 100 mL infusion  1 Units/hr IntraVENous TITRATE    dextrose (D50W) injection syrg 12.5 g  25 mL IntraVENous PRN    aspirin chewable tablet 81 mg  81 mg Oral DAILY    magnesium sulfate 1 g/100 ml IVPB (premix or compounded)  1 g IntraVENous PRN    midazolam (VERSED) injection 1 mg  1 mg IntraVENous Q1H PRN    propofol (DIPRIVAN) 10 mg/mL infusion  0-50 mcg/kg/min IntraVENous TITRATE    chlorhexidine (PERIDEX) 0.12 % mouthwash 10 mL  10 mL Oral BID    morphine 10 mg/ml injection 4 mg  4 mg IntraVENous Q1H PRN    Or    morphine 10 mg/ml injection 5 mg  5 mg IntraVENous Q1H PRN    dexmedeTOMidine (PRECEDEX) 400 mcg in 0.9% sodium chloride 100 mL infusion  0.2-0.7 mcg/kg/hr IntraVENous TITRATE    dextrose 5 % - 0.45% NaCl infusion  25 mL/hr IntraVENous CONTINUOUS    vasopressin (VASOSTRICT) 20 Units in 0.9% sodium chloride 100 mL infusion  0-0.1 Units/min IntraVENous TITRATE    PHENYLephrine (PF)(LEWIS-SYNEPHRINE) 30 mg in 0.9% sodium chloride 250 mL infusion   mcg/min IntraVENous TITRATE    sodium bicarbonate (8.4%) injection 50 mEq  50 mEq IntraVENous PRN    famotidine (PF) (PEPCID) 20 mg in 0.9% sodium chloride 10 mL injection  20 mg IntraVENous DAILY    aminocaproic acid (AMICAR) 5 g in 0.9% sodium chloride 250 mL infusion  1 g/hr IntraVENous CONTINUOUS         Objective:     Vitals:    01/14/20 0530 01/14/20 0545 01/14/20 0600 01/14/20 0610   BP:       Pulse: 82 82 98    Resp: 25 25 25    Temp:       SpO2: 100% 100% 100%    Weight:    337 lb 4.9 oz (153 kg)   Height:         Blood pressure 103/57, pulse 98, temperature (!) 100.6 °F (38.1 °C), resp. rate 25, height 5' 10\" (1.778 m), weight 337 lb 4.9 oz (153 kg), SpO2 100 %. Intake and Output:   01/12 0701 - 01/13 1900  In: 3743 [I.V.:4031]  Out: 1818 [Urine:375]  01/13 1901 - 01/14 0700  In: 3608.4 [I.V.:3548.4]  Out: 12 [Urine:145]    Physical Exam:          Constitutional:  Sedated, orally intubated and mechanically ventilated. EENMT:  Sclera clear, pupils equal, oral mucosa moist and orally intubated  Respiratory: clearer today with b/l breath sounds. Cardiovascular:  RRR with no M,G,R;  Gastrointestinal:  soft; no bowel sounds present  Musculoskeletal:  warm with no cyanosis, no lower extremity edema. Wilkinson site  leg with ace wrap. SKIN:  no jaundice or ecchymosis   Neurologic:   no gross neuro deficits  Psychiatric:  Sedated and comfortable. CXR:               LINES:  ETT, brown, swan sherri, arterial line, chest tubes times 2 in epigastric area without air leak.     DRIPS:  Levophed,Vaso, epi, versed, fentanyl,  Insulin, amiodarone    CI:  2.2    Ventilator Settings  Mode FIO2 Rate Tidal Volume Pressure PEEP   PRVC  60 %    500 ml  10 cm H2O  10 cm H20      Peak airway pressure: 29.6 cm H2O   Minute ventilation: 12 l/min     ABG:   Recent Labs     01/14/20  0453 01/13/20  0328 01/12/20  2034   PHI 7.433 7.443 7.407   PCO2I 41.0 42.2 48.1*   PO2I 115* 115* 193*   HCO3I 27.4* 28.9* 30.3*        LAB  Recent Labs     01/14/20  0344 01/13/20  0200 01/12/20  0112 01/11/20  1015   WBC 10.7 17.0* 21.3*  --    HGB 8.9* 8.0* 8.9* 8.3*   HCT 27.8* 25.1* 28.2* 25.9*   * 84* 105*  --    INR  --   --  1.4  --      Recent Labs     01/14/20  0344 01/13/20  0201 01/12/20  1833 01/12/20  0842 01/12/20  0112    136  --  142 139   K 3.4* 3.9 4.2 4.7 6.5*    100  --  110* 110*   CO2 28 30  --  26 23   * 110*  --  109* 160*   BUN 18 11  --  46* 43*   CREA 2.23* 1.44  --  3.63* 3.51*   MG 2.0 1.8 2.0 2.3  --    PHOS 3.2 3.1  --  4.2*  --    CA 8.0* 7.9*  --  8.4 7.8*   ALB 2.2* 2.5*  --  2.9* 3.1*   SGOT  --   --   --   --  64*         Assessment and Plan :  (Medical Decision Making)     Hospital Problems  Date Reviewed: 12/6/2019          Codes Class Noted POA    Atrial fibrillation ([facility] ICD-10-CM: I48.91  ICD-9-CM: 427.31  1/13/2020 Unknown        Metabolic acidosis KIG-81-TE: E87.2  ICD-9-CM: 276.2  1/11/2020 Unknown        Acute renal failure (ARF) (HCC) ICD-10-CM: N17.9  ICD-9-CM: 584.9  1/11/2020 Unknown        Hyperkalemia ICD-10-CM: E87.5  ICD-9-CM: 276.7  1/11/2020 Unknown        Aortic valve stenosis ICD-10-CM: I35.0  ICD-9-CM: 424.1  1/10/2020 Unknown        * (Principal) CAD (coronary artery disease) ICD-10-CM: I25.10  ICD-9-CM: 414.00  1/10/2020 Unknown        Weaning from respirator ([facility] ICD-10-CM: Z99.11  ICD-9-CM: V46.13  1/10/2020 Unknown        Hypoxemia ICD-10-CM: R09.02  ICD-9-CM: 799.02  1/10/2020 Unknown        S/P CABG x 3 ICD-10-CM: Z95.1  ICD-9-CM: V45.81  1/10/2020 Unknown        S/P AVR ICD-10-CM: Z95.2  ICD-9-CM: V43.3  1/10/2020 Unknown              69  y old morbidly obese s/p AVR and CABG x3. Still hypotensive but now on 2 pressors. Plan:     --continue vent support   -- off CRRT and f/u with renal.   --Bronchodilators per protocol.  --wean slowly -- not ready at this time  --continue sedation for now -- not a candidate at this time for sedation holiday. --started abx d3. cultures are negative. Still with fevers.   --continue remaining treatment per PMD.      More than 50% of the time documented was spent in face-to-face contact with the patient and in the care of the patient on the floor/unit where the patient is located.               Ailyn Little MD

## 2020-01-14 NOTE — PROGRESS NOTES
Ventilator check complete; patient has a #8. 0 ET tube secured at the 25 at the lip. Patient is not able to follow commands. Breath sounds are coarse. Trachea is midline, Negative for subcutaneous air, and chest excursion is symmetric. Patient is also Negative for cyanosis and is Negative for pitting edema. All alarms are set and audible. Resuscitation bag is at the head of the bed. Ventilator Settings  Mode FIO2 Rate Tidal Volume Pressure PEEP I:E Ratio   PRVC  50 %(decreased to 50%)   25 500 ml  10 cm H2O  10 cm H20  1:2      Peak airway pressure: 32.7 cm H2O   Minute ventilation: 12 l/min     ABG: No results for input(s): PH, PCO2, PO2, HCO3 in the last 72 hours.       Kylie Alvarez, RT

## 2020-01-14 NOTE — PROGRESS NOTES
Ventilator check complete; patient has a #8. 0 ET tube secured at the 25 at the lip. Patient is sedated. Patient is not able to follow commands. Breath sounds are clear and diminished. Trachea is midline, Negative for subcutaneous air, and chest excursion is symmetric. Patient is also Negative for cyanosis and is Negative for pitting edema. All alarms are set and audible. Resuscitation bag is at the head of the bed. Ventilator Settings  Mode FIO2 Rate Tidal Volume Pressure PEEP I:E Ratio   PRVC  60 %    500 ml  10 cm H2O  10 cm H20  1:1.43      Peak airway pressure: 29 cm H2O   Minute ventilation: 12 l/min     ABG: No results for input(s): PH, PCO2, PO2, HCO3 in the last 72 hours.       Nelsy Lynn, RT

## 2020-01-15 PROBLEM — J96.01 ACUTE HYPOXEMIC RESPIRATORY FAILURE (HCC): Status: ACTIVE | Noted: 2020-01-01

## 2020-01-15 PROBLEM — R57.9 SHOCK (HCC): Status: ACTIVE | Noted: 2020-01-01

## 2020-01-15 NOTE — PROGRESS NOTES
POD 5 Days Post-Op Procedure:  Procedure(s): CORONARY ARTERY BYPASS GRAFT WITH AVR/ (CABG X 3 )/ LIMA VEIN HARVEST/ GREATER SAPHENOUS 
ESOPHAGEAL TRANS ECHOCARDIOGRAM 
 
 
Subjective:  
 
Sedated on vent Objective:  
 
Patient Vitals for the past 8 hrs: 
 BP Temp Pulse Resp SpO2 Weight 01/15/20 0625      343 lb 0.6 oz (155.6 kg) 01/15/20 0603 122/59       
01/15/20 0600  98.6 °F (37 °C) 80 28 100 %   
01/15/20 0545   80 28 100 %   
01/15/20 0530   80 28 100 %   
01/15/20 0515   80 22 100 %   
01/15/20 0504 114/54       
01/15/20 0500  99.2 °F (37.3 °C) 80 28 100 %   
01/15/20 0445 117/57  80 24 100 %   
01/15/20 0430 122/59  80 28 100 %   
01/15/20 0417   81 28 100 %   
01/15/20 0415   83 30 100 %   
01/15/20 0400  99.2 °F (37.3 °C) 81 30 100 %   
01/15/20 0345 116/57  80 24 100 %   
01/15/20 0330 112/54  81 30 99 %   
01/15/20 0315   82 30 99 %   
01/15/20 0300  99.3 °F (37.4 °C) 82 30 98 %   
01/15/20 0245 101/55  83 30 99 %   
01/15/20 0230   83 30 98 %   
01/15/20 0215   84 29 100 %   
01/15/20 0200  99.1 °F (37.3 °C) 85 30 100 %   
01/15/20 0145 104/55  87 (!) 31 100 %   
01/15/20 0130 97/54  87 30 100 %   
01/15/20 0115   90 30 100 %   
01/15/20 0100  98.4 °F (36.9 °C) 90 (!) 32 91 %   
01/15/20 0045 94/53  89 (!) 32 100 %   
01/15/20 0030 94/49  89 30 100 %   
01/15/20 0015   88 (!) 31 100 %   
01/15/20 0001  98 °F (36.7 °C) 89 25 100 %   
20 2347   87 30 100 %   
20 2345 100/53  87 14 100 %   
20 2330 103/52  89 25 100 %  Temp (24hrs), Av.1 °F (37.3 °C), Min:97.5 °F (36.4 °C), Max:101.5 °F (38.6 °C) Hemodynamics PAP Systolic: 49 PAP 
CO (l/min): 4.4 l/min CO 
CI (l/min/m2): 1.7 l/min/m2 CI No intake/output data recorded.  1901 - 01/15 0700 In: 9437.3 [I.V.:8952.3] Out: 6720 [Urine:258] CT Drainage 
  
  
   
  total of all CT's 
 
 Heart:  regular rate and rhythm, S1, S2 normal, no murmur, click, rub or gallop Lung:  clear to auscultation bilaterally Neuro: Grossly non focal 
Incisions: Clean, dry, and intact Labs: 
Recent Results (from the past 24 hour(s)) GLUCOSE, POC Collection Time: 01/14/20  7:54 AM  
Result Value Ref Range Glucose (POC) 113 (H) 65 - 100 mg/dL POTASSIUM Collection Time: 01/14/20 11:45 AM  
Result Value Ref Range Potassium 3.6 3.5 - 5.1 mmol/L  
GLUCOSE, POC Collection Time: 01/14/20 11:45 AM  
Result Value Ref Range Glucose (POC) 72 65 - 100 mg/dL GLUCOSE, POC Collection Time: 01/14/20 12:32 PM  
Result Value Ref Range Glucose (POC) 92 65 - 100 mg/dL [place] Collection Time: 01/14/20  1:48 PM  
Result Value Ref Range Vancomycin, random 19.9 UG/ML  
GLUCOSE, POC Collection Time: 01/14/20  1:52 PM  
Result Value Ref Range Glucose (POC) 87 65 - 100 mg/dL CORTISOL, BASELINE Collection Time: 01/14/20  4:02 PM  
Result Value Ref Range Cortisol, baseline 27.6 ug/dL GLUCOSE, POC Collection Time: 01/14/20  4:06 PM  
Result Value Ref Range Glucose (POC) 131 (H) 65 - 100 mg/dL GLUCOSE, POC Collection Time: 01/14/20  7:34 PM  
Result Value Ref Range Glucose (POC) 136 (H) 65 - 100 mg/dL PTT Collection Time: 01/14/20  7:35 PM  
Result Value Ref Range aPTT 39.0 24.7 - 39.8 SEC  
CULTURE, URINE Collection Time: 01/14/20  7:50 PM  
Result Value Ref Range Special Requests: NO SPECIAL REQUESTS Culture result:     
  NO GROWTH AFTER SHORT PERIOD OF INCUBATION. FURTHER RESULTS TO FOLLOW AFTER OVERNIGHT INCUBATION. URINALYSIS W/ RFLX MICROSCOPIC Collection Time: 01/14/20  7:51 PM  
Result Value Ref Range Color GREEN Appearance CLOUDY Specific gravity Cannot be calculated 1.003 - 1.030 Specific gravity Cannot be calculated 1.003 - 1.030    
 pH (UA) Cannot be calculated 5.0 - 8.0 Protein Cannot be calculated (A) NEG mg/dL Glucose Cannot be calculated (A) NEG mg/dL Ketone Cannot be calculated (A) NEG mg/dL Bilirubin Cannot be calculated (A) NEG Blood Cannot be calculated (A) NEG Urobilinogen Cannot be calculated 0.2 - 1.0 EU/dL Nitrites Cannot be calculated (A) NEG Leukocyte Esterase Cannot be calculated (A) NEG URINE MICROSCOPIC Collection Time: 01/14/20  7:51 PM  
Result Value Ref Range WBC  0 /hpf  
 RBC  0 /hpf Epithelial cells 0-3 0 /hpf Bacteria 2+ (H) 0 /hpf Casts 0 0 /lpf Crystals, urine 0 0 /LPF Amorphous Crystals 1+ (H) 0 Mucus 0 0 /lpf  
GLUCOSE, POC Collection Time: 01/14/20  8:15 PM  
Result Value Ref Range Glucose (POC) 132 (H) 65 - 100 mg/dL GLUCOSE, POC Collection Time: 01/14/20 10:24 PM  
Result Value Ref Range Glucose (POC) 137 (H) 65 - 100 mg/dL PTT Collection Time: 01/14/20 10:28 PM  
Result Value Ref Range aPTT 50.2 (H) 24.7 - 39.8 SEC POTASSIUM Collection Time: 01/14/20 10:28 PM  
Result Value Ref Range Potassium 4.0 3.5 - 5.1 mmol/L  
POC VENOUS BLOOD GAS Collection Time: 01/14/20 11:36 PM  
Result Value Ref Range Device: VENT    
 FIO2 (POC) 50 %  
 pH, venous (POC) 7.249 (L) 7.32 - 7.42    
 pCO2, venous (POC) 67.3 (H) 41 - 51 MMHG  
 pO2, venous (POC) 23 (LL) mmHg HCO3, venous (POC) 29.5 (H) 23 - 28 MMOL/L  
 sO2, venous (POC) 31 (L) 65 - 88 % Base excess, venous (POC) 1 mmol/L Mode Pressure regulated volume control Tidal volume 500 ml Set Rate 25 bpm  
 PEEP/CPAP (POC) 10 cmH2O Allens test (POC) NOT APPLICABLE Site ALECIA [facility] Specimen type (POC) MIXED VENOUS Performed by Micky   
 CO2, POC 31 MMOL/L Critical value read back 23:40 Respiratory comment: NurseNotified Exhaled minute volume 12.80 L/min COLLECT TIME 2,335 GLUCOSE, POC Collection Time: 01/14/20 11:40 PM  
Result Value Ref Range Glucose (POC) 133 (H) 65 - 100 mg/dL POC G3 Collection Time: 01/14/20 11:45 PM  
Result Value Ref Range Device: VENT    
 FIO2 (POC) 50 % pH (POC) 7.274 (L) 7.35 - 7.45    
 pCO2 (POC) 59.0 (H) 35 - 45 MMHG  
 pO2 (POC) 86 75 - 100 MMHG  
 HCO3 (POC) 27.4 (H) 22 - 26 MMOL/L  
 sO2 (POC) 95 95 - 98 % Base excess (POC) 0 mmol/L Mode Pressure regulated volume control Tidal volume 500 ml Set Rate 25 bpm  
 PEEP/CPAP (POC) 10 cmH2O Allens test (POC) NOT APPLICABLE Site DRAWN FROM ARTERIAL LINE Specimen type (POC) ARTERIAL Performed by HamblenLaurieRT   
 CO2, POC 29 MMOL/L Exhaled minute volume 12.00 L/min COLLECT TIME 2,343 GLUCOSE, POC Collection Time: 01/15/20 12:24 AM  
Result Value Ref Range Glucose (POC) 130 (H) 65 - 100 mg/dL PTT Collection Time: 01/15/20 12:58 AM  
Result Value Ref Range aPTT 46.3 (H) 24.7 - 39.8 SEC  
GLUCOSE, POC Collection Time: 01/15/20  1:07 AM  
Result Value Ref Range Glucose (POC) 119 (H) 65 - 100 mg/dL POC G3 Collection Time: 01/15/20  1:10 AM  
Result Value Ref Range Device: VENT    
 FIO2 (POC) 50 % pH (POC) 7.333 (L) 7.35 - 7.45    
 pCO2 (POC) 49.5 (H) 35 - 45 MMHG  
 pO2 (POC) 71 (L) 75 - 100 MMHG  
 HCO3 (POC) 26.3 (H) 22 - 26 MMOL/L  
 sO2 (POC) 93 (L) 95 - 98 % Base excess (POC) 0 mmol/L Mode Pressure regulated volume control Tidal volume 500 ml Set Rate 30 bpm  
 PEEP/CPAP (POC) 10 cmH2O Allens test (POC) NOT APPLICABLE Site DRAWN FROM ARTERIAL LINE Specimen type (POC) ARTERIAL Performed by HamblenLaurieRT   
 CO2, POC 28 MMOL/L Exhaled minute volume 14.50 L/min COLLECT TIME 107 METABOLIC PANEL, COMPREHENSIVE Collection Time: 01/15/20  3:09 AM  
Result Value Ref Range Sodium 137 136 - 145 mmol/L Potassium 4.3 3.5 - 5.1 mmol/L Chloride 104 98 - 107 mmol/L  
 CO2 28 21 - 32 mmol/L  Anion gap 5 (L) 7 - 16 mmol/L  
 Glucose 112 (H) 65 - 100 mg/dL BUN 13 8 - 23 MG/DL Creatinine 1.73 (H) 0.8 - 1.5 MG/DL  
 GFR est AA 50 (L) >60 ml/min/1.73m2 GFR est non-AA 41 (L) >60 ml/min/1.73m2 Calcium 8.2 (L) 8.3 - 10.4 MG/DL Bilirubin, total 0.7 0.2 - 1.1 MG/DL  
 ALT (SGPT) 120 (H) 12 - 65 U/L  
 AST (SGOT) 166 (H) 15 - 37 U/L Alk. phosphatase 68 50 - 136 U/L Protein, total 5.4 (L) 6.3 - 8.2 g/dL Albumin 2.1 (L) 3.2 - 4.6 g/dL Globulin 3.3 2.3 - 3.5 g/dL A-G Ratio 0.6 (L) 1.2 - 3.5 MAGNESIUM Collection Time: 01/15/20  3:09 AM  
Result Value Ref Range Magnesium 1.8 1.8 - 2.4 mg/dL PHOSPHORUS Collection Time: 01/15/20  3:09 AM  
Result Value Ref Range Phosphorus 2.4 2.3 - 3.7 MG/DL  
GLUCOSE, POC Collection Time: 01/15/20  3:13 AM  
Result Value Ref Range Glucose (POC) 112 (H) 65 - 100 mg/dL GLUCOSE, POC Collection Time: 01/15/20  4:08 AM  
Result Value Ref Range Glucose (POC) 112 (H) 65 - 100 mg/dL PTT Collection Time: 01/15/20  4:10 AM  
Result Value Ref Range aPTT 66.3 (H) 24.7 - 39.8 SEC  
CBC W/O DIFF Collection Time: 01/15/20  4:10 AM  
Result Value Ref Range WBC 12.5 (H) 4.3 - 11.1 K/uL  
 RBC 2.98 (L) 4.23 - 5.6 M/uL HGB 9.3 (L) 13.6 - 17.2 g/dL HCT 29.1 (L) 41.1 - 50.3 % MCV 97.7 79.6 - 97.8 FL  
 MCH 31.2 26.1 - 32.9 PG  
 MCHC 32.0 31.4 - 35.0 g/dL  
 RDW 14.5 11.9 - 14.6 % PLATELET 84 (L) 097 - 450 K/uL MPV 11.5 9.4 - 12.3 FL ABSOLUTE NRBC 0.05 0.0 - 0.2 K/uL POC G3 Collection Time: 01/15/20  4:12 AM  
Result Value Ref Range Device: VENT    
 FIO2 (POC) 60 % pH (POC) 7.440 7.35 - 7.45    
 pCO2 (POC) 38.1 35 - 45 MMHG  
 pO2 (POC) 59 (L) 75 - 100 MMHG  
 HCO3 (POC) 25.9 22 - 26 MMOL/L  
 sO2 (POC) 91 (L) 95 - 98 % Base excess (POC) 2 mmol/L Mode Pressure regulated volume control Tidal volume 500 ml Set Rate 30 bpm  
 PEEP/CPAP (POC) 10 cmH2O  Allens test (POC) NOT APPLICABLE    
 Site DRAWN FROM ARTERIAL LINE Specimen type (POC) ARTERIAL Performed by HamblenLaurieRT   
 CO2, POC 27 MMOL/L Exhaled minute volume 14.60 L/min COLLECT TIME 408 GLUCOSE, POC Collection Time: 01/15/20  5:24 AM  
Result Value Ref Range Glucose (POC) 114 (H) 65 - 100 mg/dL POC VENOUS BLOOD GAS Collection Time: 01/15/20  5:28 AM  
Result Value Ref Range Device: VENT    
 FIO2 (POC) 75 %  
 pH, venous (POC) 7.415 7.32 - 7.42    
 pCO2, venous (POC) 43.7 41 - 51 MMHG  
 pO2, venous (POC) 24 (LL) mmHg HCO3, venous (POC) 28.0 23 - 28 MMOL/L  
 sO2, venous (POC) 43 (L) 65 - 88 % Base excess, venous (POC) 3 mmol/L Mode Pressure regulated volume control Tidal volume 500 ml Set Rate 28 bpm  
 PEEP/CPAP (POC) 10 cmH2O Allens test (POC) NOT APPLICABLE Site SWAN [employer] Specimen type (POC) MIXED VENOUS Performed by HamblenLaurieRT   
 CO2, POC 29 MMOL/L Critical value read back 05:25 Exhaled minute volume 13.40 L/min COLLECT TIME 520 GLUCOSE, POC Collection Time: 01/15/20  7:05 AM  
Result Value Ref Range Glucose (POC) 118 (H) 65 - 100 mg/dL Assessment:  
 
Principal Problem: 
  CAD (coronary artery disease) (1/10/2020) Active Problems: Aortic valve stenosis (1/10/2020) Weaning from respirator [facility]) (1/10/2020) Acute hypoxemic respiratory failure ([address] (1/10/2020) S/P CABG x 3 (1/10/2020) S/P AVR (1/10/2020) Metabolic acidosis (0/23/0349) Acute renal failure (ARF) ([address] (1/11/2020) Hyperkalemia (1/11/2020) Atrial fibrillation ([address] (1/13/2020) Shock ([address] (1/15/2020) Plan/Recommendations/Medical Decision Making:  
 
Discontinue:  chest tubes See orders Off Moris/Vaso, wean Epi off, start TF, supportive care

## 2020-01-15 NOTE — PROGRESS NOTES
[facility] Subjective: A on CKD3   Intubated, sedated Review of Systems -  
 
UTO intubated and sedated Objective: 
 
Vitals:  
 01/15/20 0757 01/15/20 0800 01/15/20 0815 01/15/20 0830 BP:  112/54 109/52 100/50 Pulse: 74 74 73 73 Resp: 28 Temp:      
SpO2: 100% 100% 100% 98% Weight:      
Height:      
 
 
PE 
Gen: intubated, multiple pressors. . 
CV:reg rate Chest:bilat breath sounds Abd: soft Ext/Access: Left Subclavian Temp [facility] Recent Labs  
  01/15/20 
0410 01/14/20 
0344 01/13/20 
0200 WBC 12.5* 10.7 17.0* HGB 9.3* 8.9* 8.0*  
HCT 29.1* 27.8* 25.1*  
PLT 84* 103* 84* Recent Labs  
  01/15/20 
0309 01/14/20 
2228 01/14/20 
1145 01/14/20 
0344 01/13/20 
0201   --   --  136 136  
K 4.3 4.0 3.6 3.4* 3.9   --   --  101 100 CO2 28  --   --  28 30 *  --   --  125* 110* BUN 13  --   --  18 11 CREA 1.73*  --   --  2.23* 1.44  
MG 1.8  --   --  2.0 1.8 PHOS 2.4  --   --  3.2 3.1 CA 8.2*  --   --  8.0* 7.9* ALB 2.1*  --   --  2.2* 2.5* TBILI 0.7  --   --   --   --   
*  --   --   --   --   
SGOT 166*  --   --   --   --   
 
 
 
 
Radiology A/P:  
Patient Active Problem List  
Diagnosis Code  DM type 2 (diabetes mellitus, type 2) (HCC) E11.9  
 Gout M10.9  
 HTN (hypertension) I10  
 BPH (benign prostatic hyperplasia) N40.0  Seasonal allergic rhinitis J30.2  
 HLD (hyperlipidemia) E78.5  Nonrheumatic aortic valve stenosis I35.0  Obesity, morbid ([address] E66.01  
 Type 2 diabetes with nephropathy ([facility]) E11.21  
 Bilateral carotid artery stenosis I65.23  
 Aortic valve stenosis I35.0  
 CAD (coronary artery disease) I25.10  Weaning from respirator [facility]) Z99.11  
 Acute hypoxemic respiratory failure ([facility]) J96.01  
 S/P CABG x 3 Z95.1  
 S/P AVR Z95.2  Metabolic acidosis X27.0  Acute renal failure (ARF) ([facility]) N17.9  Hyperkalemia E87.5  Atrial fibrillation ([facility] I48.91  
  Shock ([address] R57.9 A on CKD - Non-olguric. Renal US unremarkable. Seen on CRRT continue current tx ASHD/ AS - s/p CABG  Aortic Valve replacement DM 
 
HTN/ Volume - UF as tolerated Resp Failure - Hypoxic Resp failure from pulmonary edema on vent.  
 
 
 
Justina Martin MD

## 2020-01-15 NOTE — PROGRESS NOTES
MCT and PCT removed with no complications. Petroleum gauze, 4x4 dressing placed over CT sites. Right swan sherri catheter removed. No complications noted. VSS. Will continue to monitor.

## 2020-01-15 NOTE — PROGRESS NOTES
IV heparin rate has been adjusted based on the most recent PTT results. Lab Results Component Value Date/Time  
 aPTT 39.0 01/14/2020 07:35 PM  
 
Heparin bolus of 2000 units given per protocol see eMAR, rate increase by 200 units per hour, current heparin rate is 1.2ml/hr

## 2020-01-15 NOTE — PROGRESS NOTES
Garrett Borges, dialysis RN notified of clots in CRRT line. Will restring machine. No complications noted at this time. Will continue to monitor.

## 2020-01-15 NOTE — DIALYSIS
CRRT extracorporeal circuit clotted. New line setup in place and treatment resumed without difficulty. Heparin continued at hourly rate of 1800 units/hr. Primary RN Corrie Crowell at bedside, machine orders with no change:   .

## 2020-01-15 NOTE — PROGRESS NOTES
Cardiovascular ICU Progress Note: 1/15/2020 Juani Macario Admission Date: 1/10/2020 Patient had CABG x 3 and AVR . Slow to improve. Had small ptx that self resolved. Progressive renal failure on HD now. Sedated on Vent. The patient's chart is reviewed and the patient is discussed with the staff. Subjective:  
 
Sedated and intubated still on levophed and epinephrine. CRRT of 3.4L yesterday. Last fever was yesterday at 1130. TTE 1/13 suggested reasonable EF at 45%, but poor windows for further assessment. FiO2 and rate had to be increased overnight from 50% to 75% and Rate up to 28. Off of vasopressin yesterday. Epi gtt has decreased slowly over past couple days. Levophed dose relatively stable. Still fairly tenuous with movements. PA catheter thermodilution stopped working last night. Pressure measurements continue to work. SVR reportedly in 600-700s Review of Systems Review of systems not obtained due to patient factors. -- sedated and unresponsive. Allergies Allergen Reactions  Amoxicillin Rash  Keflex [Cephalexin] Rash  Pcn [Penicillins] Other (comments) \"felt closed in\". No rash Current Facility-Administered Medications Medication Dose Route Frequency  amiodarone (CORDARONE) 450 mg in dextrose 5% 250 mL infusion  0.5 mg/min IntraVENous CONTINUOUS  Vancomycin RD level reminder   Other ONCE  Vancomycin Intermittent Dosing Placeholder. Other Rx Dosing/Monitoring  0.9% sodium chloride infusion 250 mL  250 mL IntraVENous PRN  
 NOREPINephrine (LEVOPHED) 8,000 mcg in dextrose 5% 250 mL infusion  0.05-0.2 mcg/kg/min IntraVENous TITRATE  metroNIDAZOLE (FLAGYL) IVPB premix 500 mg  500 mg IntraVENous Q12H  
 heparin (porcine) 1,000 unit/mL injection 1,000 Units  1,000 Units Hemodialysis DIALYSIS PRN  
 aztreonam (AZACTAM) 2 g in 0.9% sodium chloride (MBP/ADV) 100 mL  2 g IntraVENous Q12H  albuterol (PROVENTIL VENTOLIN) nebulizer solution 2.5 mg  2.5 mg Nebulization QID PRN  
 fentaNYL in normal saline (pf) 25 mcg/mL infusion  0-200 mcg/hr IntraVENous TITRATE  furosemide (LASIX) 100 mg in 0.9% sodium chloride 100 mL infusion  20 mg/hr IntraVENous CONTINUOUS  
 midazolam (VERSED) 100 mg in 0.9% sodium chloride 100 mL infusion  0-10 mg/hr IntraVENous TITRATE  acetaminophen (TYLENOL) solution 650 mg  650 mg Per NG tube Q4H PRN  
 0.9% sodium chloride infusion  25 mL/hr IntraVENous CONTINUOUS  
 sodium chloride (NS) flush 5-40 mL  5-40 mL IntraVENous Q8H  
 sodium chloride (NS) flush 5-40 mL  5-40 mL IntraVENous PRN  
 oxyCODONE-acetaminophen (PERCOCET) 5-325 mg per tablet 1 Tab  1 Tab Oral Q4H PRN  
 morphine 10 mg/ml injection 3 mg  3 mg IntraVENous Q1H PRN  
 naloxone (NARCAN) injection 0.4 mg  0.4 mg IntraVENous PRN  
 EPINEPHrine (ADRENALIN) 4 mg in 0.9% sodium chloride 250 mL infusion  0.04-0.1 mcg/kg/min IntraVENous TITRATE  nitroglycerin (Tridil) 200 mcg/ml infusion   mcg/min IntraVENous TITRATE  [Held by provider] amiodarone (CORDARONE) tablet 200 mg  200 mg Oral Q12H  
 [Held by provider] metoprolol tartrate (LOPRESSOR) tablet 25 mg  25 mg Oral Q12H  
 atorvastatin (LIPITOR) tablet 80 mg  80 mg Oral QHS  insulin regular (NOVOLIN R, HUMULIN R) 100 Units in 0.9% sodium chloride 100 mL infusion  1 Units/hr IntraVENous TITRATE  dextrose (D50W) injection syrg 12.5 g  25 mL IntraVENous PRN  
 aspirin chewable tablet 81 mg  81 mg Oral DAILY  magnesium sulfate 1 g/100 ml IVPB (premix or compounded)  1 g IntraVENous PRN  
 midazolam (VERSED) injection 1 mg  1 mg IntraVENous Q1H PRN  propofol (DIPRIVAN) 10 mg/mL infusion  0-50 mcg/kg/min IntraVENous TITRATE  chlorhexidine (PERIDEX) 0.12 % mouthwash 10 mL  10 mL Oral BID  morphine 10 mg/ml injection 4 mg  4 mg IntraVENous Q1H PRN  Or  
 morphine 10 mg/ml injection 5 mg  5 mg IntraVENous Q1H PRN  
  dexmedeTOMidine (PRECEDEX) 400 mcg in 0.9% sodium chloride 100 mL infusion  0.2-0.7 mcg/kg/hr IntraVENous TITRATE  dextrose 5 % - 0.45% NaCl infusion  25 mL/hr IntraVENous CONTINUOUS  
 vasopressin (VASOSTRICT) 20 Units in 0.9% sodium chloride 100 mL infusion  0-0.1 Units/min IntraVENous TITRATE  PHENYLephrine (PF)(LEWIS-SYNEPHRINE) 30 mg in 0.9% sodium chloride 250 mL infusion   mcg/min IntraVENous TITRATE  sodium bicarbonate (8.4%) injection 50 mEq  50 mEq IntraVENous PRN  
 famotidine (PF) (PEPCID) 20 mg in 0.9% sodium chloride 10 mL injection  20 mg IntraVENous DAILY  aminocaproic acid (AMICAR) 5 g in 0.9% sodium chloride 250 mL infusion  1 g/hr IntraVENous CONTINUOUS Objective:  
 
Vitals:  
 01/15/20 0445 01/15/20 0500 01/15/20 0504 01/15/20 0515 BP: 117/57  114/54 Pulse: 80 80  80 Resp: 24 28  22 Temp:  99.2 °F (37.3 °C) SpO2: 100% 100%  100% Weight:      
Height:      
 
Blood pressure 114/54, pulse 80, temperature 99.2 °F (37.3 °C), resp. rate 22, height 5' 10\" (1.778 m), weight 337 lb 4.9 oz (153 kg), SpO2 100 %. Intake and Output:  
01/13 0701 - 01/14 1900 In: 4623.4 [I.V.:3548.4] Out: 2786 [Urine:223] 01/14 1901 - 01/15 0700 In: 61 Out: 2376 Physical Exam:         
Constitutional:  Sedated, orally intubated and mechanically ventilated. EENMT:  Sclera clear, pupils equal, oral mucosa moist and orally intubated Respiratory: clearer today with b/l breath sounds. Cardiovascular:  RRR with no M,G,R; 
Gastrointestinal:  soft; no bowel sounds present Musculoskeletal:  warm with no cyanosis, no lower extremity edema. [place] site  leg with ace wrap. SKIN:  no jaundice or ecchymosis Neurologic:   no gross neuro deficits Psychiatric:  Sedated and comfortable. CXR:   1/15: low lung volumes LINES:  ETT, brown, swan sherri, arterial line, chest tubes times 2 in epigastric area without air leak. DRIPS: 
Epi 0.005mcg/kg/min Levo 0.18mcg/kg/min Amio 0.5mg/min Versed 4mg/hr Fentanyl 125mcg/hr Ventilator Settings Mode FIO2 Rate Tidal Volume Pressure PEEP PRVC  75 %    500 ml  10 cm H2O  10 cm H20 Peak airway pressure: 37.2 cm H2O Minute ventilation: 13.2 l/min ABG:  
Recent Labs  
  01/15/20 
0412 01/15/20 
0110 01/14/20 
2345 PHI 7.440 7.333* 7.274* PCO2I 38.1 49.5* 59.0*  
PO2I 59* 71* 86 HCO3I 25.9 26.3* 27.4* LAB Recent Labs  
  01/15/20 
0410 01/14/20 
0344 01/13/20 
0200 WBC 12.5* 10.7 17.0* HGB 9.3* 8.9* 8.0*  
HCT 29.1* 27.8* 25.1*  
PLT 84* 103* 84* Recent Labs  
  01/15/20 
0309 01/14/20 
2228 01/14/20 
1145 01/14/20 
0344 01/13/20 
0201   --   --  136 136  
K 4.3 4.0 3.6 3.4* 3.9   --   --  101 100 CO2 28  --   --  28 30 *  --   --  125* 110* BUN 13  --   --  18 11 CREA 1.73*  --   --  2.23* 1.44  
MG 1.8  --   --  2.0 1.8 PHOS 2.4  --   --  3.2 3.1 CA 8.2*  --   --  8.0* 7.9* ALB 2.1*  --   --  2.2* 2.5* SGOT 166*  --   --   --   --   
 
Assessment and Plan :  (Medical Decision Making) Hospital Problems  Date Reviewed: 12/6/2019 Codes Class Noted POA Atrial fibrillation [facility]) ICD-10-CM: I48.91 
ICD-9-CM: 427.31  1/13/2020 Unknown Metabolic acidosis D-44-UP: E87.2 ICD-9-CM: 276.2  1/11/2020 Unknown Acute renal failure (ARF) (HCC) ICD-10-CM: N17.9 ICD-9-CM: 584.9  1/11/2020 Unknown Hyperkalemia ICD-10-CM: E87.5 ICD-9-CM: 276.7  1/11/2020 Unknown Aortic valve stenosis ICD-10-CM: I35.0 ICD-9-CM: 424.1  1/10/2020 Unknown * (Principal) CAD (coronary artery disease) ICD-10-CM: I25.10 ICD-9-CM: 414.00  1/10/2020 Unknown Weaning from respirator [facility]) ICD-10-CM: Z99.11 ICD-9-CM: V46.13  1/10/2020 Unknown Hypoxemia ICD-10-CM: R09.02 
ICD-9-CM: 799.02  1/10/2020 Unknown S/P CABG x 3 ICD-10-CM: Z95.1 ICD-9-CM: V45.81  1/10/2020 Unknown S/P AVR ICD-10-CM: Z95.2 ICD-9-CM: V43.3  1/10/2020 Unknown 69  y old morbidly obese s/p AVR and CABG x3. Still hypotensive but now on 2 pressors. Plan:  
 
--continue vent support, increase PEEP to 14, decrease Vt to 450 for peak perssures 
--may need to consider PC or bilevel as well as flolan if hypoxemia worsens --continue pressors for vasodilatory shock, wean slowly as able 
--continue CRRT for volume removal as much as tolerated --Bronchodilators per protocol 
--TERRI?, increase paced HR?-defer to cards 
--continue sedation for now -- not a candidate at this time for sedation holiday 
--started abx d4. cultures are negative. Fevers improved over past 20 hours 
--given methylene blue x 1 yesterday for vasoplegia --continue aggressive supportive care Full Code, remains critically ill, 35 minutes spent in critical care in evaluation and treatment this morning. More than 50% of the time documented was spent in face-to-face contact with the patient and in the care of the patient on the floor/unit where the patient is located.  
 
Rob Terrazas MD

## 2020-01-15 NOTE — PROGRESS NOTES
Nutrition: 
Reason for assessment: Consult for TF management per nutritional support protocols. ( Dr Tosha Acevedo) Assessment:  
PMH: BPH, DM, gout, HLD, HTN, morbid obesity, seasonal allergies, JOAQUIM 
S/P CABG and AVR on 1-10. Pt with hypotension, ATN,fevers, a fib post-op. Remains intubated. Received CRRT started 1-12, CRRT resumed 1-14 and continues today Food/Nutrition History: OGT in place,  Abdomen semi-soft with hypoactive bowel sounds. Date of Last BM: pre-op OGT output 550 ml yesterday Pertinent Medications: Azactam, Pepcid, Fentanyl, Insulin gtt ( received 52 unit yesterday, 93 units on 1-13, and 118 units on 1-12), Flagyl, Versed, Levophed NS at 25 ml/hr and D5 1/2 NS at 25 ml/hr Epinephrine turned off this morning, Vasopressin turned off 1-13 Pertinent labs: POC Glucoses:   mg/dl Anthropometrics:Height: 5' 10\" (177.8 cm), Weight Source: Bed, Weight: 155.6 kg (343 lb 0.6 oz) Edema: Anasarca, 3+ BUE and BLE Pre-op standing scale weight: 136.6 kg, BMI 43.2 c/w class III obesity. UBW range per EMR: 300-306# Macronutrient needs:(using UBW (Usual body weight) 136.4 kg and IBW 75.5 kg) EER:  8308-5984 kcal /day (11-14 kcal/kg UBW) 
EPR:  128-151 grams protein/day (1.7-2 grams/kg IBW) Max CHO:  238 grams/day (50% kcal) Fluid:  1000-1500ml/d Intake/Comparative standards: Current NPO status + IVF does not meet kcal or protein needs Nutrition Diagnosis: Difficulty swallowing r/t respiratory status as evidenced by intubation precludes po intake. Intervention: 
Meals and snacks: NPO 
EN:Start TF of Vital 1.2 AF at 10 ml/hr  with 30ml water q 8hr to provide 288 kcal/day (19% of needs), 18 grams protein/day (14% of needs), 26 grams CHO/day (does not exceed max CHO),  and ~ 285ml free water/day (28% of needs). Nutrition Supplement Therapy: Electrolyte replacement per nutritional support protocols are active on MAR. Labs: Has active order for renal panel daily until 1-18.  Add Phos and  MWF and daily BMP to start 1-19. IV: IVF per MD. 
Coordination of nutrition care: Discussed with Jesse Lamb, RN Discharge nutrition plan: Too soon to determine. Tita Latrice, [address], [address] [place] [address]

## 2020-01-15 NOTE — PROGRESS NOTES
A follow up visit was made to the patient. Emotional support, spiritual presence and  
prayer were provided for the patient. He was not alert. EDMOND Toney

## 2020-01-15 NOTE — PROGRESS NOTES
IV heparin rate has been adjusted based on the most recent PTT results. Lab Results Component Value Date/Time  
 aPTT 46.3 (H) 01/15/2020 12:58 AM  
 
Heparin bolus of 2000 units given per protocol see eMAR, rate increase by 200 units per hour, current heparin rate is 1.5ml/hr

## 2020-01-15 NOTE — PROGRESS NOTES
Ventilator check complete; patient has a #8. 0 ET tube secured at the 25 at the lip. Patient is sedated. Patient is not able to follow commands. Breath sounds are coarse. Trachea is midline, Negative for subcutaneous air, and chest excursion is symmetric. Patient is also Negative for cyanosis and is Negative for pitting edema. All alarms are set and audible. Resuscitation bag is at the head of the bed. Ventilator Settings Mode FIO2 Rate Tidal Volume Pressure PEEP I:E Ratio PRVC  50 %   25 500 ml  10 cm H2O  10 cm H20  1:2   
 
Peak airway pressure: 31.4 cm H2O Minute ventilation: 12.1 l/min Diane Uribe, RT

## 2020-01-15 NOTE — PROGRESS NOTES
IV heparin rate has been adjusted based on the most recent PTT results. Lab Results Component Value Date/Time  
 aPTT 50.2 (H) 01/14/2020 10:28 PM  
 
Rate increased by 100 units per hour. Current heparin rate is now 1.3ml/hr

## 2020-01-15 NOTE — PROGRESS NOTES
IV heparin rate has been adjusted based on the most recent PTT results. Lab Results Component Value Date/Time  
 aPTT 66.3 (H) 01/15/2020 04:10 AM  
 
No change per protocol

## 2020-01-15 NOTE — PROGRESS NOTES
[facility] CARDIOLOGY PROGRESS NOTE 
      
 
1/15/2020 11:12 AM 
 
Admit Date: 1/10/2020 Subjective:  
Patient remains intubated and sedated. Remains on levaphed and epi. Requiring more vent support. Now on FIO2 of 75% and increased PEEP at 14. Currently V-pace at 14. Intermittent atrial flutter/fib. On IV amiodarone. ROS:  UNABLE TO OBTAIN DUE TO INABILITY OF PATIENT TO COMMUNICATE SECONDARY TO CURRENT INTUBATION AND NEED FOR MECHANICAL VENTILATION. Objective:  
  
Vitals:  
 01/15/20 0545 01/15/20 0600 01/15/20 0603 01/15/20 5575 BP:   122/59 Pulse: 80 80 Resp: 28 28 Temp:  98.6 °F (37 °C) SpO2: 100% 100% Weight:    155.6 kg (343 lb 0.6 oz) Height:      
 
 
Physical Exam: 
General-Intubated. Obese. Neck- supple, no JVD 
CV- RRR Lung- Coarse BS Abd- soft, nontender, nondistended Ext- 2+ diffuse edema Skin- warm and dry Psychiatric:  Unable to accurately assess due to intubated and sedated status. Neurologic:  Unable to accurately assess due to intubated and sedated status. Data Review:  
Labs:  
Recent Labs  
  01/15/20 
0410 01/15/20 
0309 01/14/20 
2228  01/14/20 
0344 NA  --  137  --   --  136 K  --  4.3 4.0   < > 3.4* MG  --  1.8  --   --  2.0  
BUN  --  13  --   --  18  
CREA  --  1.73*  --   --  2.23* GLU  --  112*  --   --  125* WBC 12.5*  --   --   --  10.7 HGB 9.3*  --   --   --  8.9* HCT 29.1*  --   --   --  27.8* PLT 84*  --   --   --  103*  
 < > = values in this interval not displayed. No results found for: JENNA Escboedo TELEMETRY:  Atrial fibrillation with LBBB. Assessment/Plan:  
 
Principal Problem: 
  CAD (coronary artery disease) (1/10/2020) S/P multivessel CABG. Critically ill post op. Supportive care. Wean pressors as tolerated. Active Problems: Aortic valve stenosis (1/10/2020) S/P AVR. See above. Weaning from respirator [facility]) (1/10/2020) Vent management per pulmoanry. Hypoxemia (1/10/2020) Continue vent support. Metabolic acidosis (7/78/9344) ON dialysis. Acute renal failure (ARF) ([facility] (1/11/2020) Continue CRRT. Atrial fibrillation ([facility] (1/13/2020) IV amiodarone. Not candidate at present for anticoagulation. Shock:  Low SVR and requiring multiple pressors. TERRI with normal LV function and normal valve function. No effusion seen. Mild RV dysfunction but not dilated. Critically ill. Check cortisol level. Continue pressors.   
 
 
 
 
 
Alaina Cuba MD 
1/15/2020 11:12 AM

## 2020-01-15 NOTE — PROGRESS NOTES
Ventilator check complete; patient has a #8. 0 ET tube secured at the 25 at the lip. Patient is sedated. Patient is not able to follow commands. Breath sounds are coarse. Trachea is midline, Negative for subcutaneous air, and chest excursion is symmetric. Patient is also Negative for cyanosis and is Negative for pitting edema. All alarms are set and audible. Resuscitation bag is at the head of the bed. Ventilator Settings Mode FIO2 Rate Tidal Volume Pressure PEEP I:E Ratio PRVC  60 %   28 450 ml  10 cm H2O  14 cm H20  1:2   
 
Peak airway pressure: 33.7 cm H2O Minute ventilation: 12.2 l/min [address], RT

## 2020-01-16 PROBLEM — E87.20 METABOLIC ACIDOSIS: Status: RESOLVED | Noted: 2020-01-01 | Resolved: 2020-01-01

## 2020-01-16 PROBLEM — E87.5 HYPERKALEMIA: Status: RESOLVED | Noted: 2020-01-01 | Resolved: 2020-01-01

## 2020-01-16 NOTE — PROGRESS NOTES
[facility] CARDIOLOGY PROGRESS NOTE 
      
 
1/16/2020 3:01 PM 
 
Admit Date: 1/10/2020 Subjective: TERRI yesterday did not show large effusion AV appeared to be functioning fine, ef normal.  Remains on Epi/Levo but epi has almost been titrated off. ROS: 
Cardiovascular:intubated in ICU Objective:  
  
Vitals:  
 01/16/20 1330 01/16/20 1345 01/16/20 1359 01/16/20 1414 BP: 123/57 127/59 127/59 127/58 Pulse: 75 75 76 75 Resp:  11 Temp:      
SpO2: 100% 100% 97% 97% Weight:      
Height:      
 
 
Physical Exam: 
General: sedated on vent Neck: supple, no JVD Heart: S1S2 with RRR without murmurs or gallops Lungs: Clear throughout auscultation bilaterally without adventitious sounds Abd: soft, nontender, nondistended, with good bowel sounds Ext: no edema bilaterally Skin: warm and dry Data Review:  
Recent Labs  
  01/16/20 
0916 01/16/20 
0238  01/15/20 
0410 01/15/20 
0309  01/14/20 
0344 NA  --  141  --   --  137  --  136  
K 4.3 4.1   < >  --  4.3   < > 3.4* MG  --   --   --   --  1.8  --  2.0  
BUN  --  14  --   --  13  --  18  
CREA  --  1.61*  --   --  1.73*  --  2.23* GLU  --  120*  --   --  112*  --  125* WBC  --   --   --  12.5*  --   --  10.7 HGB  --   --   --  9.3*  --   --  8.9* HCT  --   --   --  29.1*  --   --  27.8* PLT  --   --   --  84*  --   --  103*  
 < > = values in this interval not displayed. No results for input(s): Raya Amaya in the last 72 hours. Tele: intermittent SR and Afib Assessment/Plan:  
 
Principal Problem: 
  CAD (coronary artery disease) (1/10/2020) Active Problems: Aortic valve stenosis (1/10/2020) [facility] (1/10/[facility]) Acute hypoxemic respiratory failure ([address] (1/10/2020) S/P CABG x 3 (1/10/2020) S/P AVR (1/10/2020) Acute renal failure (ARF) ([address] (1/11/2020) Atrial fibrillation ([address] (1/13/2020) Shock ([address] (1/15/2020) A/P 
 1) CAD - post CABG on asa/statin 2) Shock - (not cardiogenic) pressor support improved from yesterday continue to ween as tolerated 3) s/p AVR 4) DEJUAN - CRRT per nephrology 5) Hypoxia/vent dep - ween per pulm/ICU tea 6) Afib - continue IV amiodarone 05 mg/min in and out of afib, has pacer wires still inplace no OAC with recent surgery.  
 
Jaspal Iniguez MD 
1/16/2020 3:01 PM

## 2020-01-16 NOTE — PROGRESS NOTES
Spoke with Claudio Bowling from Dialysis stated  \"bringing a new dialysis machine from dialysis unit to restart Pt CRRT. \"

## 2020-01-16 NOTE — PROGRESS NOTES
Nutrition Follow up: TF management per nutritional support protocols. ( Dr Eleazar Barney) Assessment:  
Food/Nutrition History: Remains on vent and TF dependent via OGT. Pt has tolerated star of trickle TFat 10 ml/hr yesterday with abdomen with hypoactive bowel sounds. Date of Last BM: pre-op. MAP consistently >65 Pertinent Medications: Azactam, Pepcid, Fentanyl, Insulin gtt ( received 59 unit yesterday), Flagyl, Levophed. Epinephrine resumed yesterday. NS at 25 ml/hr and D5 1/2 NS at 25 ml/hr Pertinent labs: POC Glucoses:  120-169 mg/dl. Hx of DM with 42 units of Lantus bid and 40 units Humalog with evening meal 
Anthropometrics:Height: 5' 10\" (177.8 cm), Weight Source: Bed, Weight: 154 kg (339 lb 8.1 oz) Edema: Anasarca, 3+ BUE and BLE Pre-op standing scale weight: 136.6 kg UBW range per EMR: 300-306# Macronutrient needs:(using UBW (Usual body weight) 136.4 kg and IBW 75.5 kg) EER:  9566-1522 kcal /day (11-14 kcal/kg UBW) 
EPR:  128-151 grams protein/day (1.7-2 grams/kg IBW)-for CRRT Max CHO:  238 grams/day (50% kcal) Fluid:  1000-1500ml/d Intake/Comparative standards: Current TF: Vital 1.2 AF at 10 ml/hr  with 30ml water q 8hr to provide 288 kcal/day (19% of needs), 18 grams protein/day (14% of needs), 26 grams CHO/day (does not exceed max CHO),  and ~ 285ml free water/day (28% of needs) Intervention: 
Meals and snacks: NPO 
EN: Continue trickle TF but increase Vital 1.2 AF to 20 ml/hr  with 30ml water q 8hr to provide 576 kcal/day (38% of needs), 36 grams protein/day (28% of needs), 52 grams CHO/day (does not exceed max CHO),  and ~ 480ml free water/day (48% of needs). Nutrition Supplement Therapy: Electrolyte replacement per nutritional support protocols are active on MAR. Labs: Has active order for renal panel daily until 1-18 and Phos and  MWF and daily BMP to start 1-19. IV: IVF per MD. 
Coordination of nutrition care: Discussed with OLYA Varela, [address] [place] [address], [address] [place] [address], [address] [place] [address]

## 2020-01-16 NOTE — PROGRESS NOTES
Ventilator check complete; patient has a #8. 0 ET tube secured at the 25 at the lip. Patient is sedated. Patient is not able to follow commands. Breath sounds are diminished. Trachea is midline, Negative for subcutaneous air, and chest excursion is symmetric. Patient is also Negative for cyanosis. All alarms are set and audible. Resuscitation bag is at the head of the bed. Ventilator Settings Mode FIO2 Rate Tidal Volume Pressure PEEP I:E Ratio PRVC  50 %   28 450 ml    14 cm H20  1:2   
 
Peak airway pressure: 30.7 cm H2O Minute ventilation: 12.3 l/min ABG: No results for input(s): PH, PCO2, PO2, HCO3 in the last 72 hours.  
 
 
Lacie Nunez, RT

## 2020-01-16 NOTE — PROGRESS NOTES
CRRT treatment stopped due to Filter clotted able to return all of pt's blood at this time. Ports packed with normal saline. VSS Continue to monitor.

## 2020-01-16 NOTE — PROGRESS NOTES
Ventilator check complete; patient has a #8. 0 ET tube secured at the 25 at the lip. Patient is sedated. Patient is not able to follow commands. Breath sounds are coarse. Trachea is midline, Negative for subcutaneous air, and chest excursion is symmetric. Patient is also Negative for cyanosis and is Negative for pitting edema. All alarms are set and audible. Resuscitation bag is at the head of the bed. Ventilator Settings Mode FIO2 Rate Tidal Volume Pressure PEEP I:E Ratio PRVC  60 %   28 450 ml  10 cm H2O  14 cm H20  1:2   
 
Peak airway pressure: 31.3 cm H2O Minute ventilation: 12.2 l/min RT Gonzales

## 2020-01-16 NOTE — PROGRESS NOTES
s    [facility] Subjective: A on CKD3   Intubated, sedated Review of Systems -  
 
UTO intubated and sedated Objective: 
 
Vitals:  
 01/16/20 0559 01/16/20 0615 01/16/20 0630 01/16/20 7143 BP: 117/55 121/58 124/58 120/56 Pulse: 79 79 79 79 Resp: 16 19 12 (!) 7 Temp:      
SpO2: 98% 98% 99% 99% Weight:      
Height:      
 
 
PE 
Gen: intubated, multiple pressors. . 
CV:reg rate Chest:bilat breath sounds Abd: soft Ext/Access: Left Subclavian Temp HD cath Jayro [facility]ons LAB Recent Labs  
  01/15/20 
0410 01/14/20 
0344 WBC 12.5* 10.7 HGB 9.3* 8.9* HCT 29.1* 27.8* PLT 84* 103* Recent Labs  
  01/16/20 
0238 01/15/20 
2331 01/15/20 
1632  01/15/20 
0309  01/14/20 
0344   --   --   --  137  --  136  
K 4.1 3.8 3.8   < > 4.3   < > 3.4*  
  --   --   --  104  --  101 CO2 32  --   --   --  28  --  28  
*  --   --   --  112*  --  125* BUN 14  --   --   --  13  --  18  
CREA 1.61*  --   --   --  1.73*  --  2.23* MG  --   --   --   --  1.8  --  2.0 PHOS 2.6  --   --   --  2.4  --  3.2 CA 8.1*  --   --   --  8.2*  --  8.0* ALB 1.9*  --   --   --  2.1*  --  2.2* TBILI  --   --   --   --  0.7  --   --   
ALT  --   --   --   --  120*  --   --   
SGOT  --   --   --   --  166*  --   --   
 < > = values in this interval not displayed. Radiology A/P:  
Patient Active Problem List  
Diagnosis Code  DM type 2 (diabetes mellitus, type 2) (HCC) E11.9  
 Gout M10.9  
 HTN (hypertension) I10  
 BPH (benign prostatic hyperplasia) N40.0  Seasonal allergic rhinitis J30.2  
 HLD (hyperlipidemia) E78.5  Nonrheumatic aortic valve stenosis I35.0  Obesity, morbid ([facility] [address] E66.01  
 Type 2 diabetes with nephropathy (HCC) E11.21  
 Bilateral carotid artery stenosis I65.23  
 Aortic valve stenosis I35.0  
 CAD (coronary artery disease) I25.10  Weaning from respirator [facility]) Z99.11  
 Acute hypoxemic respiratory failure ([facility] [address] J96.01  
  S/P CABG x 3 Z95.1  
 S/P AVR Z95.2  Acute renal failure (ARF) (HCC) N17.9  Atrial fibrillation ([address] I48.91  Shock ([address] R57.9 A on CKD - Non-olguric. Renal US unremarkable. Pt has been on CRRT RX 72 hrs but has received a total of 48 hrs thus far. Plan Start 12 hours MWF starting in am  
 
 
ASHD/ AS - s/p CABG  Aortic Valve replacement DM 
 
HTN/ Volume - UF as tolerated Resp Failure - Hypoxic Resp failure from pulmonary edema on vent.  
 
 
 
John Birmingham MD

## 2020-01-16 NOTE — DIALYSIS
CRRT extracorporeal circuit clotted. New line setup in place and treatment resumed without difficulty. Heparin continued at 1700 units/hr and new PTT to be drawn 3 hours from restring. Report given to Sandi Morris RN and machine setting same:   .

## 2020-01-16 NOTE — PROGRESS NOTES
Pharmacokinetic Consult to Pharmacist 
 
He Fiordaliza is a 69 y.o. male being treated for sepsis with aztreonam, metronidazole, and vancomycin. Height: 5' 10\" (177.8 cm)  Weight: 154 kg (339 lb 8.1 oz) Lab Results Component Value Date/Time BUN 14 01/16/2020 02:38 AM  
 Creatinine 1.61 (H) 01/16/2020 02:38 AM  
 WBC 12.5 (H) 01/15/2020 04:10 AM  
 Procalcitonin 4.23 01/12/2020 01:53 AM  
  
Estimated Creatinine Clearance: 60.9 mL/min (A) (based on SCr of 1.61 mg/dL (H)). Lab Results Component Value Date/Time Vancomycin, random 20.5 01/15/2020 06:22 PM  
 
 
 
Day 5 of vancomycin. Goal trough is 15-20, but dosing intermittently based on random levels due to CRRT. Vancomycin random level resulted on 1/15 at 20.5 mcg/ml and was re-dosed with 2000mg x1 on 1/16. Patient continues on CRRT. Per nephrology, plan to start 12 hours MWF on 1/17. Will continue to dose intermittently while on CRRT. A random level has been ordered with AM labs on 1/17. Pharmacy will continue to follow. Thank you, Yeimi Reynolds, PharmD, BCCCP  [facility] [email or web address]

## 2020-01-16 NOTE — PROGRESS NOTES
POD 6 Days Post-Op Procedure:  Procedure(s): CORONARY ARTERY BYPASS GRAFT WITH AVR/ (CABG X 3 )/ LIMA VEIN HARVEST/ GREATER SAPHENOUS 
ESOPHAGEAL TRANS ECHOCARDIOGRAM 
 
 
Subjective:  
 
Sedated on vent Objective:  
 
Patient Vitals for the past 8 hrs: 
 BP Pulse Resp SpO2  
20 0645 120/56 79 (!) 7 99 % 20 0630 124/58 79 12 99 % 20 0615 121/58 79 19 98 % 20 0559 117/55 79 16 98 % 20 0545 113/56 79 (!) 0 98 % 20 0530 122/58 79  98 % 20 0515 119/58 79 16 98 % 20 0459 110/52 81 11 98 % 20 0445 105/54 82 (!) 0 98 % 20 0430 105/54 83 (!) 0 97 % 20 0400  83 (!) 0 97 % 20 0355  83 28 97 % 20 0345 114/55 84  98 % 20 0330 110/53 84  98 % 20 0315 109/55 85  98 % 20 0300  86  98 % 20 0245  86 8 98 % 20 0230 119/52 87  98 % 20 0215  87 11 97 % 20 0200  88  97 % 20 0145  89 (!) 4 97 % 20 0130 106/52 88  98 % 20 0115 107/51 87 10 97 % 20 0111  87  98 % 20 0100  86 (!) 5 98 % 20 0059 107/51 86 28 98 % Temp (24hrs), Av.3 °F (36.8 °C), Min:97.9 °F (36.6 °C), Max:98.5 °F (36.9 °C) Hemodynamics PAP Systolic: 50 PAP 
CO (l/min): 8.1 l/min CO 
CI (l/min/m2): 3.1 l/min/m2 CI No intake/output data recorded. 1901 -  0700 In: 3863 [I.V.:8205] Out: 3056 [LRTDX:426] CT Drainage 
  
  
   
  total of all CT's Heart:  regular rate and rhythm, S1, S2 normal, no murmur, click, rub or gallop Lung:  clear to auscultation bilaterally Neuro: Grossly non focal 
Incisions: Clean, dry, and intact Labs: 
Recent Results (from the past 24 hour(s)) PTT Collection Time: 01/15/20 10:00 AM  
Result Value Ref Range aPTT 54.7 (H) 24.7 - 39.8 SEC POTASSIUM Collection Time: 01/15/20 10:00 AM  
Result Value Ref Range  Potassium 3.9 3.5 - 5.1 mmol/L  
GLUCOSE, POC  
 Collection Time: 01/15/20 10:12 AM  
Result Value Ref Range Glucose (POC) 127 (H) 65 - 100 mg/dL GLUCOSE, POC Collection Time: 01/15/20  3:04 PM  
Result Value Ref Range Glucose (POC) 129 (H) 65 - 100 mg/dL PTT Collection Time: 01/15/20  3:10 PM  
Result Value Ref Range aPTT 102.9 (H) 24.7 - 39.8 SEC POTASSIUM Collection Time: 01/15/20  4:32 PM  
Result Value Ref Range Potassium 3.8 3.5 - 5.1 mmol/L  
GLUCOSE, POC Collection Time: 01/15/20  5:12 PM  
Result Value Ref Range Glucose (POC) 130 (H) 65 - 100 mg/dL Zeb Legato Collection Time: 01/15/20  6:22 PM  
Result Value Ref Range Vancomycin, random 20.5 UG/ML  
GLUCOSE, POC Collection Time: 01/15/20  7:21 PM  
Result Value Ref Range Glucose (POC) 140 (H) 65 - 100 mg/dL PTT Collection Time: 01/15/20  7:28 PM  
Result Value Ref Range aPTT 58.7 (H) 24.7 - 39.8 SEC  
GLUCOSE, POC Collection Time: 01/15/20  8:48 PM  
Result Value Ref Range Glucose (POC) 154 (H) 65 - 100 mg/dL GLUCOSE, POC Collection Time: 01/15/20  8:50 PM  
Result Value Ref Range Glucose (POC) 155 (H) 65 - 100 mg/dL GLUCOSE, POC Collection Time: 01/15/20 11:04 PM  
Result Value Ref Range Glucose (POC) 144 (H) 65 - 100 mg/dL POTASSIUM Collection Time: 01/15/20 11:31 PM  
Result Value Ref Range Potassium 3.8 3.5 - 5.1 mmol/L  
GLUCOSE, POC Collection Time: 01/15/20 11:36 PM  
Result Value Ref Range Glucose (POC) 146 (H) 65 - 100 mg/dL RENAL FUNCTION PANEL Collection Time: 01/16/20  2:38 AM  
Result Value Ref Range Sodium 141 136 - 145 mmol/L Potassium 4.1 3.5 - 5.1 mmol/L Chloride 105 98 - 107 mmol/L  
 CO2 32 21 - 32 mmol/L Anion gap 4 (L) 7 - 16 mmol/L Glucose 120 (H) 65 - 100 mg/dL BUN 14 8 - 23 MG/DL Creatinine 1.61 (H) 0.8 - 1.5 MG/DL  
 GFR est AA 54 (L) >60 ml/min/1.73m2 GFR est non-AA 45 (L) >60 ml/min/1.73m2  Calcium 8.1 (L) 8.3 - 10.4 MG/DL  
 Phosphorus 2.6 2.3 - 3.7 MG/DL Albumin 1.9 (L) 3.2 - 4.6 g/dL PTT, CRRT PROTOCOL (PTT DRIP) Collection Time: 01/16/20  2:38 AM  
Result Value Ref Range PTT, CRRT PROTOCOL 68.2 (H) 24.7 - 39.8 SEC  
GLUCOSE, POC Collection Time: 01/16/20  2:44 AM  
Result Value Ref Range Glucose (POC) 120 (H) 65 - 100 mg/dL GLUCOSE, POC Collection Time: 01/16/20  3:10 AM  
Result Value Ref Range Glucose (POC) 123 (H) 65 - 100 mg/dL POC VENOUS BLOOD GAS Collection Time: 01/16/20  3:51 AM  
Result Value Ref Range Device: VENT    
 FIO2 (POC) 60 %  
 pH, venous (POC) 7.381 7.32 - 7.42    
 pCO2, venous (POC) 47.9 41 - 51 MMHG  
 pO2, venous (POC) 100 mmHg HCO3, venous (POC) 28.4 (H) 23 - 28 MMOL/L  
 sO2, venous (POC) 98 (H) 65 - 88 % Base excess, venous (POC) 3 mmol/L Mode Pressure regulated volume control Tidal volume 450 ml Set Rate 28 bpm  
 PEEP/CPAP (POC) 14 cmH2O Allens test (POC) NOT APPLICABLE Site DRAWN FROM ARTERIAL LINE Specimen type (POC) VENOUS BLOOD Performed by Micky   
 CO2, POC 30 MMOL/L Exhaled minute volume 12.10 L/min COLLECT TIME 350 PTT Collection Time: 01/16/20  5:27 AM  
Result Value Ref Range aPTT 48.3 (H) 24.7 - 39.8 SEC  
GLUCOSE, POC Collection Time: 01/16/20  5:32 AM  
Result Value Ref Range Glucose (POC) 122 (H) 65 - 100 mg/dL Assessment:  
 
Principal Problem: 
  CAD (coronary artery disease) (1/10/2020) Active Problems: Aortic valve stenosis (1/10/2020) Weaning from respirator [facility]) (1/10/2020) Acute hypoxemic respiratory failure ([address] (1/10/2020) S/P CABG x 3 (1/10/2020) S/P AVR (1/10/2020) Acute renal failure (ARF) ([facility] [address] (1/11/2020) Atrial fibrillation ([facility] [address] (1/13/2020) [facility] (1/15/2020) Plan/Recommendations/Medical Decision Making:  
 
Discontinue:  chest tubes See orders Still on epi/levo, wean slowly, remains vasoplegic, supportive care

## 2020-01-16 NOTE — PROGRESS NOTES
A follow up visit was made to the patient. Emotional support, spiritual presence and  
prayer were provided for the patient. He was not alert. EDMOND Gale

## 2020-01-16 NOTE — PROGRESS NOTES
Cardiovascular ICU Progress Note: 1/16/2020 Yariel Soclair Admission Date: 1/10/2020 Patient had CABG x 3 and AVR . Slow to improve. Had small ptx that self resolved. Progressive renal failure on HD now. Sedated on Vent. The patient's chart is reviewed and the patient is discussed with the staff. Subjective:  
 
Sedated and intubated still on levophed and epinephrine. CRRT of 3.4L yesterday. Last fever was yesterday at 1130. TTE 1/13 suggested reasonable EF at 45%, but poor windows for further assessment. FiO2 and rate had to be increased overnight from 50% to 75% and Rate up to 28. Off of vasopressin yesterday. Epi gtt has decreased slowly over past couple days. Levophed dose relatively stable. Still fairly tenuous with movements. PA catheter thermodilution stopped working last night. Pressure measurements continue to work. SVR reportedly in 600-700s Review of Systems Review of systems not obtained due to patient factors. -- sedated and unresponsive. Allergies Allergen Reactions  Amoxicillin Rash  Keflex [Cephalexin] Rash  Pcn [Penicillins] Other (comments) \"felt closed in\". No rash Current Facility-Administered Medications Medication Dose Route Frequency  NUTRITIONAL SUPPORT ELECTROLYTE PRN ORDERS   Does Not Apply PRN  
 NOREPINephrine (LEVOPHED) 16,000 mcg in dextrose 5% 250 mL infusion  0.05-0.2 mcg/kg/min IntraVENous TITRATE  vancomycin (VANCOCIN) 2000 mg in  ml infusion  2,000 mg IntraVENous ONCE  
 amiodarone (CORDARONE) 450 mg in dextrose 5% 250 mL infusion  0.5 mg/min IntraVENous CONTINUOUS  Vancomycin Intermittent Dosing Placeholder. Other Rx Dosing/Monitoring  0.9% sodium chloride infusion 250 mL  250 mL IntraVENous PRN  
 metroNIDAZOLE (FLAGYL) IVPB premix 500 mg  500 mg IntraVENous Q12H  
 heparin (porcine) 1,000 unit/mL injection 1,000 Units  1,000 Units Hemodialysis DIALYSIS PRN  
  aztreonam (AZACTAM) 2 g in 0.9% sodium chloride (MBP/ADV) 100 mL  2 g IntraVENous Q12H  
 albuterol (PROVENTIL VENTOLIN) nebulizer solution 2.5 mg  2.5 mg Nebulization QID PRN  
 fentaNYL in normal saline (pf) 25 mcg/mL infusion  0-200 mcg/hr IntraVENous TITRATE  midazolam (VERSED) 100 mg in 0.9% sodium chloride 100 mL infusion  0-10 mg/hr IntraVENous TITRATE  acetaminophen (TYLENOL) solution 650 mg  650 mg Per NG tube Q4H PRN  
 0.9% sodium chloride infusion  25 mL/hr IntraVENous CONTINUOUS  
 sodium chloride (NS) flush 5-40 mL  5-40 mL IntraVENous Q8H  
 sodium chloride (NS) flush 5-40 mL  5-40 mL IntraVENous PRN  
 oxyCODONE-acetaminophen (PERCOCET) 5-325 mg per tablet 1 Tab  1 Tab Oral Q4H PRN  
 morphine 10 mg/ml injection 3 mg  3 mg IntraVENous Q1H PRN  
 naloxone (NARCAN) injection 0.4 mg  0.4 mg IntraVENous PRN  
 EPINEPHrine (ADRENALIN) 4 mg in 0.9% sodium chloride 250 mL infusion  0.04-0.1 mcg/kg/min IntraVENous TITRATE  [Held by provider] amiodarone (CORDARONE) tablet 200 mg  200 mg Oral Q12H  
 [Held by provider] metoprolol tartrate (LOPRESSOR) tablet 25 mg  25 mg Oral Q12H  
 atorvastatin (LIPITOR) tablet 80 mg  80 mg Oral QHS  insulin regular (NOVOLIN R, HUMULIN R) 100 Units in 0.9% sodium chloride 100 mL infusion  1 Units/hr IntraVENous TITRATE  dextrose (D50W) injection syrg 12.5 g  25 mL IntraVENous PRN  
 aspirin chewable tablet 81 mg  81 mg Oral DAILY  magnesium sulfate 1 g/100 ml IVPB (premix or compounded)  1 g IntraVENous PRN  
 midazolam (VERSED) injection 1 mg  1 mg IntraVENous Q1H PRN  chlorhexidine (PERIDEX) 0.12 % mouthwash 10 mL  10 mL Oral BID  morphine 10 mg/ml injection 4 mg  4 mg IntraVENous Q1H PRN  Or  
 morphine 10 mg/ml injection 5 mg  5 mg IntraVENous Q1H PRN  
 dextrose 5 % - 0.45% NaCl infusion  25 mL/hr IntraVENous CONTINUOUS  
 vasopressin (VASOSTRICT) 20 Units in 0.9% sodium chloride 100 mL infusion  0-0.1 Units/min IntraVENous TITRATE  PHENYLephrine (PF)(LEWIS-SYNEPHRINE) 30 mg in 0.9% sodium chloride 250 mL infusion   mcg/min IntraVENous TITRATE  sodium bicarbonate (8.4%) injection 50 mEq  50 mEq IntraVENous PRN  
 famotidine (PF) (PEPCID) 20 mg in 0.9% sodium chloride 10 mL injection  20 mg IntraVENous DAILY Objective:  
 
Vitals:  
 01/16/20 0345 01/16/20 0355 01/16/20 0400 01/16/20 0430 BP: 114/55   105/54 Pulse: 84 83 83 83 Resp:  28 (!) 0 (!) 0 Temp:      
SpO2: 98% 97% 97% 97% Weight:      
Height:      
 
Blood pressure 105/54, pulse 83, temperature 97.9 °F (36.6 °C), resp. rate (!) 0, height 5' 10\" (1.778 m), weight 339 lb 8.1 oz (154 kg), SpO2 97 %. Intake and Output:  
01/14 0701 - 01/15 1900 In: 7020.1 [I.V.:6361.1] Out: Severo Ou [UBHPB:971] 01/15 1901 - 01/16 0700 In: 18 Out: 2397 [Urine:50] Physical Exam:         
Constitutional:  Sedated, orally intubated and mechanically ventilated. EENMT:  Sclera clear, pupils equal, oral mucosa moist and orally intubated Respiratory: clearer today with b/l breath sounds. Cardiovascular:  RRR with no M,G,R; 
Gastrointestinal:  soft; no bowel sounds present Musculoskeletal:  warm with no cyanosis, no lower extremity edema. Edgar site  leg with ace wrap. SKIN:  no jaundice or ecchymosis Neurologic:   no gross neuro deficits Psychiatric:  Sedated and comfortable. CXR:   1/15: low lung volumes LINES:  ETT, brown, swan sherri, arterial line, chest tubes times 2 in epigastric area without air leak. DRIPS: 
Epi gtt Levo gtt Amio 0.5mg/min Fentanyl gtt Ventilator Settings Mode FIO2 Rate Tidal Volume Pressure PEEP PRVC  50 %    450 ml  10 cm H2O  14 cm H20 Peak airway pressure: 31.1 cm H2O Minute ventilation: 12.2 l/min ABG:  
Recent Labs  
  01/15/20 
0412 01/15/20 
0110 01/14/20 
2345 PHI 7.440 7.333* 7.274* PCO2I 38.1 49.5* 59.0*  
PO2I 59* 71* 86  
 HCO3I 25.9 26.3* 27.4* LAB Recent Labs  
  01/15/20 
0410 01/14/20 
0344 WBC 12.5* 10.7 HGB 9.3* 8.9* HCT 29.1* 27.8* PLT 84* 103* Recent Labs  
  01/16/20 
0238 01/15/20 
2331 01/15/20 
1632  01/15/20 
0309  01/14/20 
0344   --   --   --  137  --  136  
K 4.1 3.8 3.8   < > 4.3   < > 3.4*  
  --   --   --  104  --  101 CO2 32  --   --   --  28  --  28  
*  --   --   --  112*  --  125* BUN 14  --   --   --  13  --  18  
CREA 1.61*  --   --   --  1.73*  --  2.23* MG  --   --   --   --  1.8  --  2.0 PHOS 2.6  --   --   --  2.4  --  3.2 CA 8.1*  --   --   --  8.2*  --  8.0* ALB 1.9*  --   --   --  2.1*  --  2.2*  
SGOT  --   --   --   --  166*  --   --   
 < > = values in this interval not displayed. Assessment and Plan :  (Medical Decision Making) Hospital Problems  Date Reviewed: 12/6/2019 Codes Class Noted POA Shock ([facility] [address] still on pressors  ICD-10-CM: R57.9 ICD-9-CM: 785.50  1/15/2020 Unknown Atrial fibrillation [facility]) ICD-10-CM: I48.91 
ICD-9-CM: 427.31  1/13/2020 Unknown Acute renal failure (ARF) (HCC) ICD-10-CM: N17.9 ICD-9-CM: 584.9  1/11/2020 Unknown Aortic valve stenosis ICD-10-CM: I35.0 ICD-9-CM: 424.1  1/10/2020 Unknown * (Principal) CAD (coronary artery disease) ICD-10-CM: I25.10 ICD-9-CM: 414.00  1/10/2020 Unknown Weaning from respirator [facility]) still on vent  ICD-10-CM: Z99.11 ICD-9-CM: V46.13  1/10/2020 Unknown Acute hypoxemic respiratory failure ([address] ICD-10-CM: J96.01 
ICD-9-CM: 518.81  1/10/2020 S/P CABG x 3 ICD-10-CM: Z95.1 ICD-9-CM: V45.81  1/10/2020 Unknown S/P AVR ICD-10-CM: Z95.2 ICD-9-CM: V43.3  1/10/2020 Unknown 69  y old morbidly obese s/p AVR and CABG x3. Still hypotensive but now on 2 pressors. Plan:  
 
--continue vent support, not ready for weaning  
--continue pressors for vasodilatory shock, wean slowly as able --continue CRRT for volume removal as much as tolerated --Bronchodilators per protocol --continue aggressive supportive care More than 50% of the time documented was spent in face-to-face contact with the patient and in the care of the patient on the floor/unit where the patient is located.  
 
José Miguel Wong MD

## 2020-01-17 PROBLEM — J93.9 BILATERAL PNEUMOTHORAX: Status: ACTIVE | Noted: 2020-01-01

## 2020-01-17 PROBLEM — D69.6 THROMBOCYTOPENIA (HCC): Status: ACTIVE | Noted: 2020-01-01

## 2020-01-17 NOTE — PROGRESS NOTES
Nutrition F/U Noted per Dr Bird Points for : \"Epi off, wean Levo, TF to goal\" Epi was turned off this morning. RN reports TF has been off most of the day d/t MAP < 65 and was just resumed at 20 ml/hr. Levophed has not been able to be weaned. Therefore will continue low rate/trickle feedings until Levo able to be weaned some. Discussed with You Tovar RN [facility] [address], [address] [place] [address], [address] [place] [address]

## 2020-01-17 NOTE — PROGRESS NOTES
POD 7 Days Post-Op Procedure:  Procedure(s): CORONARY ARTERY BYPASS GRAFT WITH AVR/ (CABG X 3 )/ LIMA VEIN HARVEST/ GREATER SAPHENOUS 
ESOPHAGEAL TRANS ECHOCARDIOGRAM 
 
 
Subjective:  
 
Sedated on vent Objective:  
 
Patient Vitals for the past 8 hrs: 
 BP Pulse Resp SpO2 Weight 20 0619     (!) 351 lb 13.7 oz (159.6 kg) 20 0615  74 25 96 %   
20 0600  74 14 96 %   
20 0545  74 (!) 6 95 %   
20 0530  75 22 94 %   
20 0514  78 8 93 %   
20 0501  74 26 96 %   
20 0446  73 25 97 %   
20 0430  73 25 97 %   
20 0415  73 25 97 %   
20 0400  73 25 97 %   
20 0359 104/54 73 25 97 %   
20 0345  73 20 97 %   
20 0330  74 25 97 %   
20 0315  74 25 96 %   
20 0303  74 25 96 %   
20 0300  74 25 96 %   
20 0259 106/57 75 25 96 %   
20 0245  71 28 98 %   
20 0230  71 28 98 %   
20 0215  71 28 98 %   
20 0200  71 16 98 %   
20 0159 102/57 71 22 99 %   
20 0145  72 28 99 %   
20 0130  72 28 99 %   
20 0115  72 28 100 %   
20 0100  73 28 100 %   
20 0059 92/54 73 28 100 %   
20 0045  73 28 100 %   
20 0030  74 28 100 %   
20 0015  73 28 100 %   
20 2359 97/54 72 28 100 %   
20 2345  72 28 100 %   
20 2330  74 28 99 %   
20 2315  74 28 99 %  Temp (24hrs), Av.2 °F (37.3 °C), Min:98.4 °F (36.9 °C), Max:100.6 °F (38.1 °C) Hemodynamics PAP Systolic: 50 PAP 
CO (l/min): 5.9 l/min CO 
CI (l/min/m2): 2.3 l/min/m2 CI No intake/output data recorded. 01/15 1901 -  0700 In: 4714.8 [I.V.:3096.8] Out: 3953 [Urine:255] CT Drainage 
  
  
   
  total of all CT's Heart:  regular rate and rhythm, S1, S2 normal, no murmur, click, rub or gallop Lung:  clear to auscultation bilaterally Neuro: Grossly non focal 
 Incisions: Clean, dry, and intact Labs: 
Recent Results (from the past 24 hour(s)) POTASSIUM Collection Time: 01/16/20  9:16 AM  
Result Value Ref Range Potassium 4.3 3.5 - 5.1 mmol/L  
GLUCOSE, POC Collection Time: 01/16/20  9:20 AM  
Result Value Ref Range Glucose (POC) 154 (H) 65 - 100 mg/dL GLUCOSE, POC Collection Time: 01/16/20  1:27 PM  
Result Value Ref Range Glucose (POC) 169 (H) 65 - 100 mg/dL POTASSIUM Collection Time: 01/16/20  2:21 PM  
Result Value Ref Range Potassium 3.8 3.5 - 5.1 mmol/L  
GLUCOSE, POC Collection Time: 01/16/20  3:11 PM  
Result Value Ref Range Glucose (POC) 150 (H) 65 - 100 mg/dL GLUCOSE, POC Collection Time: 01/16/20  5:05 PM  
Result Value Ref Range Glucose (POC) 152 (H) 65 - 100 mg/dL POTASSIUM Collection Time: 01/16/20  8:40 PM  
Result Value Ref Range Potassium 3.4 (L) 3.5 - 5.1 mmol/L  
GLUCOSE, POC Collection Time: 01/16/20  8:45 PM  
Result Value Ref Range Glucose (POC) 156 (H) 65 - 100 mg/dL GLUCOSE, POC Collection Time: 01/16/20 11:37 PM  
Result Value Ref Range Glucose (POC) 147 (H) 65 - 100 mg/dL GLUCOSE, POC Collection Time: 01/17/20  2:11 AM  
Result Value Ref Range Glucose (POC) 150 (H) 65 - 100 mg/dL POC G3 Collection Time: 01/17/20  2:50 AM  
Result Value Ref Range Device: VENT    
 FIO2 (POC) 50 % pH (POC) 7.498 (H) 7.35 - 7.45    
 pCO2 (POC) 35.5 35 - 45 MMHG  
 pO2 (POC) 98 75 - 100 MMHG  
 HCO3 (POC) 27.5 (H) 22 - 26 MMOL/L  
 sO2 (POC) 98 95 - 98 % Base excess (POC) 4 mmol/L Mode Pressure regulated volume control Tidal volume 450 ml Set Rate 28 bpm  
 PEEP/CPAP (POC) 14 cmH2O Allens test (POC) NOT APPLICABLE Site DRAWN FROM ARTERIAL LINE Specimen type (POC) ARTERIAL Performed by Micky   
 CO2, POC 29 MMOL/L Exhaled minute volume 12.20 L/min COLLECT TIME 250 RENAL FUNCTION PANEL  Collection Time: 01/17/20  4:00 AM  
 Result Value Ref Range Sodium 141 136 - 145 mmol/L Potassium 3.6 3.5 - 5.1 mmol/L Chloride 106 98 - 107 mmol/L  
 CO2 27 21 - 32 mmol/L Anion gap 8 7 - 16 mmol/L Glucose 133 (H) 65 - 100 mg/dL BUN 35 (H) 8 - 23 MG/DL Creatinine 3.18 (H) 0.8 - 1.5 MG/DL  
 GFR est AA 25 (L) >60 ml/min/1.73m2 GFR est non-AA 20 (L) >60 ml/min/1.73m2 Calcium 7.8 (L) 8.3 - 10.4 MG/DL Phosphorus 3.3 2.3 - 3.7 MG/DL Albumin 1.5 (L) 3.2 - 4.6 g/dL Lorena Docker Collection Time: 01/17/20  4:00 AM  
Result Value Ref Range Vancomycin, random 26.9 UG/ML  
CBC W/O DIFF Collection Time: 01/17/20  4:00 AM  
Result Value Ref Range WBC 10.6 4.3 - 11.1 K/uL  
 RBC 2.49 (L) 4.23 - 5.6 M/uL HGB 7.8 (L) 13.6 - 17.2 g/dL HCT 24.4 (L) 41.1 - 50.3 % MCV 98.0 (H) 79.6 - 97.8 FL  
 MCH 31.3 26.1 - 32.9 PG  
 MCHC 32.0 31.4 - 35.0 g/dL  
 RDW 14.6 11.9 - 14.6 % PLATELET 37 (L) 541 - 450 K/uL MPV 12.1 9.4 - 12.3 FL ABSOLUTE NRBC 0.09 0.0 - 0.2 K/uL GLUCOSE, POC Collection Time: 01/17/20  4:11 AM  
Result Value Ref Range Glucose (POC) 134 (H) 65 - 100 mg/dL GLUCOSE, POC Collection Time: 01/17/20  6:40 AM  
Result Value Ref Range Glucose (POC) 119 (H) 65 - 100 mg/dL Assessment:  
 
Principal Problem: 
  CAD (coronary artery disease) (1/10/2020) Active Problems: Aortic valve stenosis (1/10/2020) [facility] ([facility]) Acute hypoxemic respiratory failure ([address] (1/10/2020) S/P CABG x 3 (1/10/2020) S/P AVR (1/10/2020) Acute renal failure (ARF) ([address] (1/11/2020) Atrial fibrillation ([address] (1/13/2020) Shock ([facility] (1/15/2020) Plan/Recommendations/Medical Decision Making:  
 
Epi off, wean Levo, TF to goal, minimize sedation, supportive care See orders

## 2020-01-17 NOTE — PROGRESS NOTES
CRRT clotted off. Notified Gila Zavala dialysis RN. She plans to notify Dr. Daniel Perry and will call back. Machine turned off but not cleaned at this time.

## 2020-01-17 NOTE — DIALYSIS
12 HR SLED initiated using  Left CVC. Aspirated and flushed both catheter ports without problem. Machine settings per MD order. 150  DFR  250 UFR Heparin 2000 unit bolus and 1000 ml/hr. Reported to primary nurse. Dialysis nurse available as needed.

## 2020-01-17 NOTE — DIALYSIS
Cathflo removed from both ports with positive flow in both ports. CRRT treatment continued for 6 more hours per MD orders with BFR increased to 200 per MD order due to clotting. Primary RN states pt is starting Argatroban as well. Dialysis RN remains available.

## 2020-01-17 NOTE — PROGRESS NOTES
Cardiovascular ICU Progress Note: 1/17/2020 Ab Correa Admission Date: 1/10/2020 Patient had CABG x 3 and AVR . Slow to improve. Had small ptx that self resolved. Progressive renal failure on HD now. Sedated on Vent. The patient's chart is reviewed and the patient is discussed with the staff. Subjective:  
 
Sedation just off now, but not alert. Still on levophed. Off fentanyl. Not on CRRT now Review of Systems Review of systems not obtained due to patient factors. -- sedated and unresponsive. Allergies Allergen Reactions  Amoxicillin Rash  Keflex [Cephalexin] Rash  Pcn [Penicillins] Other (comments) \"felt closed in\". No rash Current Facility-Administered Medications Medication Dose Route Frequency  alcohol 62% (NOZIN) nasal  1 Ampule  1 Ampule Topical Q12H  
 NUTRITIONAL SUPPORT ELECTROLYTE PRN ORDERS   Does Not Apply PRN  
 NOREPINephrine (LEVOPHED) 16,000 mcg in dextrose 5% 250 mL infusion  0.05-0.2 mcg/kg/min IntraVENous TITRATE  amiodarone (CORDARONE) 450 mg in dextrose 5% 250 mL infusion  0.5 mg/min IntraVENous CONTINUOUS  Vancomycin Intermittent Dosing Placeholder. Other Rx Dosing/Monitoring  0.9% sodium chloride infusion 250 mL  250 mL IntraVENous PRN  
 metroNIDAZOLE (FLAGYL) IVPB premix 500 mg  500 mg IntraVENous Q12H  
 heparin (porcine) 1,000 unit/mL injection 1,000 Units  1,000 Units Hemodialysis DIALYSIS PRN  
 aztreonam (AZACTAM) 2 g in 0.9% sodium chloride (MBP/ADV) 100 mL  2 g IntraVENous Q12H  
 albuterol (PROVENTIL VENTOLIN) nebulizer solution 2.5 mg  2.5 mg Nebulization QID PRN  
 fentaNYL in normal saline (pf) 25 mcg/mL infusion  0-200 mcg/hr IntraVENous TITRATE  midazolam (VERSED) 100 mg in 0.9% sodium chloride 100 mL infusion  0-10 mg/hr IntraVENous TITRATE  acetaminophen (TYLENOL) solution 650 mg  650 mg Per NG tube Q4H PRN  
  0.9% sodium chloride infusion  25 mL/hr IntraVENous CONTINUOUS  
 sodium chloride (NS) flush 5-40 mL  5-40 mL IntraVENous Q8H  
 sodium chloride (NS) flush 5-40 mL  5-40 mL IntraVENous PRN  
 oxyCODONE-acetaminophen (PERCOCET) 5-325 mg per tablet 1 Tab  1 Tab Oral Q4H PRN  
 morphine 10 mg/ml injection 3 mg  3 mg IntraVENous Q1H PRN  
 naloxone (NARCAN) injection 0.4 mg  0.4 mg IntraVENous PRN  
 EPINEPHrine (ADRENALIN) 4 mg in 0.9% sodium chloride 250 mL infusion  0.04-0.1 mcg/kg/min IntraVENous TITRATE  [Held by provider] amiodarone (CORDARONE) tablet 200 mg  200 mg Oral Q12H  
 [Held by provider] metoprolol tartrate (LOPRESSOR) tablet 25 mg  25 mg Oral Q12H  
 atorvastatin (LIPITOR) tablet 80 mg  80 mg Oral QHS  insulin regular (NOVOLIN R, HUMULIN R) 100 Units in 0.9% sodium chloride 100 mL infusion  1 Units/hr IntraVENous TITRATE  dextrose (D50W) injection syrg 12.5 g  25 mL IntraVENous PRN  
 aspirin chewable tablet 81 mg  81 mg Oral DAILY  magnesium sulfate 1 g/100 ml IVPB (premix or compounded)  1 g IntraVENous PRN  
 midazolam (VERSED) injection 1 mg  1 mg IntraVENous Q1H PRN  chlorhexidine (PERIDEX) 0.12 % mouthwash 10 mL  10 mL Oral BID  morphine 10 mg/ml injection 4 mg  4 mg IntraVENous Q1H PRN Or  
 morphine 10 mg/ml injection 5 mg  5 mg IntraVENous Q1H PRN  
 dextrose 5 % - 0.45% NaCl infusion  25 mL/hr IntraVENous CONTINUOUS  
 vasopressin (VASOSTRICT) 20 Units in 0.9% sodium chloride 100 mL infusion  0-0.1 Units/min IntraVENous TITRATE  PHENYLephrine (PF)(LEWIS-SYNEPHRINE) 30 mg in 0.9% sodium chloride 250 mL infusion   mcg/min IntraVENous TITRATE  sodium bicarbonate (8.4%) injection 50 mEq  50 mEq IntraVENous PRN  
 famotidine (PF) (PEPCID) 20 mg in 0.9% sodium chloride 10 mL injection  20 mg IntraVENous DAILY Objective:  
 
Vitals:  
 01/17/20 0545 01/17/20 0600 01/17/20 0615 01/17/20 5430 BP:      
Pulse: 74 74 74 Resp: (!) 6 14 25 Temp:      
SpO2: 95% 96% 96% Weight:    (!) 351 lb 13.7 oz (159.6 kg) Height:      
 
Blood pressure 104/54, pulse 74, temperature 98.7 °F (37.1 °C), resp. rate 25, height 5' 10\" (1.778 m), weight (!) 351 lb 13.7 oz (159.6 kg), SpO2 96 %. Intake and Output:  
01/15 0701 - 01/16 1900 In: 5445.9 [I.V.:4053.9] Out: 5734 [MWVXT:472] 01/16 1901 - 01/17 0700 In: 460 Out: 80 [Urine:80] Physical Exam:         
Constitutional:  Sedated, orally intubated and mechanically ventilated. EENMT:  Sclera clear, pupils equal, oral mucosa moist and orally intubated Respiratory: clearer today with b/l breath sounds. Cardiovascular:  RRR with no M,G,R; 
Gastrointestinal:  soft; no bowel sounds present Musculoskeletal:  warm with no cyanosis, +2  lower extremity edema. [place] site  leg with ace wrap. SKIN:  no jaundice or ecchymosis Neurologic:   no gross neuro deficits Psychiatric:  Sedated and comfortable. CXR:   1/16: better aeration, but still b/l atelectasis with R side small ptx and scant on left side LINES:  ETT, brown, swan sherri, arterial line, chest tubes times 2 in epigastric area without air leak. DRIPS: 
Levo gtt Insulin Ventilator Settings Mode FIO2 Rate Tidal Volume Pressure PEEP PRVC  40 %    450 ml  10 cm H2O  14 cm H20 Peak airway pressure: 30.2 cm H2O Minute ventilation: 10.8 l/min ABG:  
Recent Labs  
  01/17/20 
0250 01/15/20 
0412 01/15/20 
0110 PHI 7.498* 7.440 7.333* PCO2I 35.5 38.1 49.5* PO2I 98 59* 71* HCO3I 27.5* 25.9 26.3*  
 
LAB Recent Labs  
  01/17/20 
0400 01/15/20 
0410 WBC 10.6 12.5* HGB 7.8* 9.3* HCT 24.4* 29.1*  
PLT 37* 84* Recent Labs  
  01/17/20 
0400 01/16/20 2040 01/16/20 
1421  01/16/20 
0238  01/15/20 
0309   --   --   --  141  --  137  
K 3.6 3.4* 3.8   < > 4.1   < > 4.3   --   --   --  105  --  104 CO2 27  --   --   --  32  --  28  
*  --   --   --  120*  --  112* BUN 35*  --   --   --  14  --  13  
CREA 3.18*  --   --   --  1.61*  --  1.73* MG  --   --   --   --   --   --  1.8 PHOS 3.3  --   --   --  2.6  --  2.4 CA 7.8*  --   --   --  8.1*  --  8.2* ALB 1.5*  --   --   --  1.9*  --  2.1*  
SGOT  --   --   --   --   --   --  166*  
 < > = values in this interval not displayed. Assessment and Plan :  (Medical Decision Making) Hospital Problems  Date Reviewed: 12/6/2019 Codes Class Noted POA Shock ([facility] still on pressors  ICD-10-CM: R57.9 ICD-9-CM: 785.50  1/15/2020 Unknown Atrial fibrillation [facility]) ICD-10-CM: I48.91 
ICD-9-CM: 427.31  1/13/2020 Unknown Acute renal failure (ARF) ([facility]) ICD-10-CM: N17.9 ICD-9-CM: 584.9  1/11/2020 Unknown Aortic valve stenosis ICD-10-CM: I35.0 ICD-9-CM: 424.1  1/10/2020 Unknown * (Principal) CAD (coronary artery disease) ICD-10-CM: I25.10 ICD-9-CM: 414.00  1/10/2020 Unknown Weaning from respirator [facility]) still on vent  ICD-10-CM: Z99.11 ICD-9-CM: V46.13  1/10/2020 Unknown Acute hypoxemic respiratory failure ([facility] ICD-10-CM: J96.01 
ICD-9-CM: 518.81  1/10/2020 S/P CABG x 3 ICD-10-CM: Z95.1 ICD-9-CM: V45.81  1/10/2020 Unknown S/P AVR ICD-10-CM: Z95.2 ICD-9-CM: V43.3  1/10/2020 Unknown Thrombocytopenia 
--see below 69  y old morbidly obese s/p AVR and CABG x3. Still hypotensive but now on 1 pressors. platelets are lower. cxr with b/l ptx -- small right and scant on left. Plan:  
 
--continue vent support 
--sedation holiday today. Start precedex if agitated per Dr. Drew White. --continue pressors to keep MAP > 65 and per PMD 
--platelts down to 37 today and check HIT panel. On ASA per pmd 
--continue CRRT per renal -- still not making urine and legs with +2 edema --cxr daily since has b/l ptx as per above --on abx D6 with cultures negative. Continue for only 1 more day, unless needed longer per CV surgery. --continue aggressive supportive care per CV surgery More than 50% of the time documented was spent in face-to-face contact with the patient and in the care of the patient on the floor/unit where the patient is located.  
 
Heidi Russell MD

## 2020-01-17 NOTE — PROGRESS NOTES
[facility] CARDIOLOGY PROGRESS NOTE 
      
 
1/17/2020 8:01 AM 
 
Admit Date: 1/10/2020 Subjective: Off epi now on levo alone. Renal function continues to decline. Remains in shock. CO 5+ L/min, swan removed with patsy-track showing similar values to swan. Feel this is likely either hypovolemia or septic shock. H/H low again today. ROS: 
Cardiovascular:  As noted above Objective:  
  
Vitals:  
 01/17/20 0600 01/17/20 0615 01/17/20 2581 01/17/20 4253 BP:    129/58 Pulse: 74 74  74 Resp: 14 25 Temp:      
SpO2: 96% 96% Weight:   (!) 159.6 kg (351 lb 13.7 oz) Height:      
 
 
 
Physical Exam: 
General: acutely ill sedated on vent Neck: supple, no JVD Heart: S1S2 with RRR without murmurs or gallops Lungs: Clear throughout auscultation bilaterally without adventitious sounds Abd: soft, nontender, nondistended, with good bowel sounds Ext: LE edema bilaterally Skin: warm and dry Data Review:  
Recent Labs  
  01/17/20 
0400 01/16/20 
2040  01/16/20 
0238  01/15/20 
0410 01/15/20 
0309   --   --  141  --   --  137  
K 3.6 3.4*   < > 4.1   < >  --  4.3 MG  --   --   --   --   --   --  1.8 BUN 35*  --   --  14  --   --  13  
CREA 3.18*  --   --  1.61*  --   --  1.73* *  --   --  120*  --   --  112* WBC 10.6  --   --   --   --  12.5*  --   
HGB 7.8*  --   --   --   --  9.3*  --   
HCT 24.4*  --   --   --   --  29.1*  --   
PLT 37*  --   --   --   --  84*  --   
 < > = values in this interval not displayed. No results for input(s): Shayy Sprawls in the last 72 hours. Assessment/Plan:  
 
Principal Problem: 
  CAD (coronary artery disease) (1/10/2020) Active Problems: Aortic valve stenosis (1/10/2020) Weaning from respirator [facility]) (1/10/2020) Acute hypoxemic respiratory failure ([facility] (1/10/2020) S/P CABG x 3 (1/10/2020) S/P AVR (1/10/2020) Acute renal failure (ARF) ([facility] (1/11/2020) Atrial fibrillation ([facility] (1/13/2020) Shock ([facility] (1/15/2020) Bilateral pneumothorax (1/17/2020) Thrombocytopenia ([address] (1/17/2020) A/P 
1) CAD - post CABG on asa/statin 2) Shock - (not cardiogenic) improved pressor req again today 3) s/p AVR 4) DEJUAN - CRRT will start run today 5) Hypoxia/vent dep - ween per pulm/ICU tea 6) Afib - continue IV amiodarone 05 mg/min in and out of afib, has pacer wires still inplace no OAC with recent surgery. 7) Anemia - recheck H/H and give x2 uPRBC if it remains below 8.  
 
 
Cira Denis MD 
1/17/2020 8:01 AM

## 2020-01-17 NOTE — PROGRESS NOTES
Ventilator check complete; patient has a #8. 0 ET tube secured at the 25 at the lip. Patient is sedated. Patient is not able to follow commands. Breath sounds are coarse and decreased. Trachea is midline, Negative for subcutaneous air, and chest excursion is symmetric. Patient is also Negative for cyanosis and is Negative for pitting edema. All alarms are set and audible. Resuscitation bag is at the head of the bed. Ventilator Settings Mode FIO2 Rate Tidal Volume Pressure PEEP I:E Ratio PRVC  50 %   28 450 ml  10 cm H2O  14 cm H20  1:2   
 
Peak airway pressure: 32.5 cm H2O Minute ventilation: 12.1 l/min Rosibel Mccloud RT

## 2020-01-17 NOTE — CONSULTS
[facility] Inpatient Hematology / Oncology Consult Note Reason for Consult: Atherosclerosis of native coronary artery of native heart with unstable angina pectoris ([facility] [address] [I25.110] Nonrheumatic aortic valve stenosis [I35.0] CAD (coronary artery disease) [I25.10] Aortic valve stenosis [I35.0] Referring Physician:  Nemo Mena MD 
 
History of Present Illness: 
Mr. Lisbeth Ritchie is a 69 y.o. male admitted on 1/10/2020. His PMH includes DM, thyroid disease, prior h/o CAD with recent [facility] 12/31/19 showing severe multivessel coronary artery disease in the setting of known severe aortic stenosis. Patient was seen by CTS and set up for surgery on 1/10/2020. Patient underwent CABG x3 (LIMA>LAD, SVG>OM, SVG>PDA) and aortic valve replacement with a 25-mm Intuity Jon-Gutierres valve. He became hypoxic, hypotensive and hard to ventilate. Pulmonary seeing patient and he remains intubated and sedated. He is also on pressors. Nephrology saw patient for worsening renal failure and CRRT was started on 1/12/2020. The patient went into AFib on 1/12 and was started on IV amiodarone. He was started on Azactam/Vanc for sepsis. CXR with increased atelectasis or infiltrate at both bases. BCx-NGTD. UCx-NG. Sputum Cx with light normal resp yessenia. On admission, WBC 16.9, Hgb 9.0, Plt 98k. On 1/17, WBC 9.3/ANC 7.6, Hgb 8.1, Plt 15k. Today platelets up to 77,244 without intervention. HIT/SHARON pending. Primary team changed heparin to argatroban. We were consulted for thrombocytopenia. Review of Systems: unable to obtain due to sedated on ventilator Allergies Allergen Reactions  Heparin Other (comments) HIT  Amoxicillin Rash  Keflex [Cephalexin] Rash  Pcn [Penicillins] Other (comments) \"felt closed in\". No rash Past Medical History:  
Diagnosis Date  Arthritis  BPH (benign prostatic hyperplasia) 1/14/2013  CAD (coronary artery disease)  DM type 2 (diabetes mellitus, type 2) ([facility] [address] dx 2004 Type 2, avg fbs , recognizes hypo at 67, last HA1C was 9.2 on 5/2014  Dyspnea   
 at times, Uses Albuterol inhaler when needed (last used first of aug 2014)  Gout 1/14/2013  HLD (hyperlipidemia) 1/14/2013  
 HTN (hypertension)   
 controlled with meds  Morbid obesity ([facility] [address] 9/3/14 BMI 41.7  Psychiatric disorder   
 anxiety, controlled with buspar  Rheumatic fever   
 as a child  Seasonal allergic rhinitis   
 treated with Allegra  Severe aortic valve stenosis   
 echo 09/11/19 EF 60-65%- mild to moderate regurgitation  Thyroid disease   
 hx- no longer takes synthroid  Unspecified sleep apnea 2016  
 bi pap Past Surgical History:  
Procedure Laterality Date  HX CATARACT REMOVAL Bilateral 2012  HX HEART CATHETERIZATION  12/23/2019  HX ORTHOPAEDIC Left   
 carpal tunnel  HX TONSIL AND ADENOIDECTOMY Family History Problem Relation Age of Onset  Lung Disease Mother   
     copd  Cancer Father   
     lympnode cancer  No Known Problems Brother  Cancer Other   
     fmh lymphoma  Diabetes Other   
     fmhx  Diabetes Maternal Grandfather Social History Socioeconomic History  Marital status:  Spouse name: Not on file  Number of children: Not on file  Years of education: Not on file  Highest education level: Not on file Occupational History  Not on file Social Needs  Financial resource strain: Not on file  Food insecurity:  
  Worry: Not on file Inability: Not on file  Transportation needs:  
  Medical: Not on file Non-medical: Not on file Tobacco Use  Smoking status: Never Smoker  Smokeless tobacco: Never Used Substance and Sexual Activity  Alcohol use: Yes Alcohol/week: 0.0 standard drinks Comment: rarely  Drug use: Never  Sexual activity: Not on file Lifestyle  Physical activity: Days per week: Not on file Minutes per session: Not on file  Stress: Not on file Relationships  Social connections:  
  Talks on phone: Not on file Gets together: Not on file Attends [employer] service: Not on file Active member of club or organization: Not on file Attends meetings of clubs or organizations: Not on file Relationship status: Not on file  Intimate partner violence:  
  Fear of current or ex partner: Not on file Emotionally abused: Not on file Physically abused: Not on file Forced sexual activity: Not on file Other Topics Concern  Not on file Social History Narrative  Not on file Current Facility-Administered Medications Medication Dose Route Frequency Provider Last Rate Last Dose  aztreonam (AZACTAM) 500 mg in 0.9% sodium chloride 100 mL IVPB  500 mg IntraVENous Q12H Selam Mcdonough  mL/hr at 01/18/20 0822 500 mg at 01/18/20 2051  albumin human 25% (BUMINATE) solution 12.5 g  12.5 g IntraVENous Q6H Andrew Navas MD   12.5 g at 01/18/20 1227  epoetin maritza-epbx (RETACRIT) 14,000 Units combo injection  14,000 Units SubCUTAneous Q7D Preethi Abreu MD   14,000 Units at 01/17/20 1117  
 argatroban 50 mg in 0.9% sodium chloride 50 mL (1000 mcg/mL) infusion  0.5-10 mcg/kg/min IntraVENous TITRATE Sally Sparks PA 2.4 mL/hr at 01/18/20 0913 0.25 mcg/kg/min at 01/18/20 0913  
 alcohol 62% (NOZIN) nasal  1 Ampule  1 Ampule Topical Q12H Yanique Fisher MD   1 Ampule at 01/18/20 6399  
 NUTRITIONAL SUPPORT ELECTROLYTE PRN ORDERS   Does Not Apply PRN Yanique Fisher MD      
 NOREPINephrine (LEVOPHED) 16,000 mcg in dextrose 5% 250 mL infusion  0.05-0.2 mcg/kg/min IntraVENous TITRATE Yanique Fisher MD 25 mL/hr at 01/18/20 1458 0.171 mcg/kg/min at 01/18/20 1458  
 0.9% sodium chloride infusion 250 mL  250 mL IntraVENous PRN Yanique Fisher MD      
  metroNIDAZOLE (FLAGYL) IVPB premix 500 mg  500 mg IntraVENous Q12H Janice Naidu  mL/hr at 01/18/20 0822 500 mg at 01/18/20 8143  albuterol (PROVENTIL VENTOLIN) nebulizer solution 2.5 mg  2.5 mg Nebulization QID PRN Leonela Madrid MD   2.5 mg at 01/12/20 0133  fentaNYL in normal saline (pf) 25 mcg/mL infusion  0-200 mcg/hr IntraVENous TITRATE Leonela Madrid MD   Stopped at 01/17/20 8453  midazolam (VERSED) 100 mg in 0.9% sodium chloride 100 mL infusion  0-10 mg/hr IntraVENous TITRATE Lian Michael MD   Stopped at 01/15/20 3204  acetaminophen (TYLENOL) solution 650 mg  650 mg Per NG tube Q4H PRN Lian Michael MD   650 mg at 01/13/20 1625  
 0.9% sodium chloride infusion  25 mL/hr IntraVENous CONTINUOUS Paramjit Castro MD 25 mL/hr at 01/18/20 0728 25 mL/hr at 01/18/20 6850  sodium chloride (NS) flush 5-40 mL  5-40 mL IntraVENous Q8H Paramjit Castro MD   10 mL at 01/18/20 1337  
 sodium chloride (NS) flush 5-40 mL  5-40 mL IntraVENous PRN Paramjit Castro MD      
 oxyCODONE-acetaminophen (PERCOCET) 5-325 mg per tablet 1 Tab  1 Tab Oral Q4H PRN Paramjit Castro MD      
 morphine 10 mg/ml injection 3 mg  3 mg IntraVENous Q1H PRN Paramjit Castro MD      
 naloxone [facility]) injection 0.4 mg  0.4 mg IntraVENous PRN Paramjit Castro MD      
 EPINEPHrine (ADRENALIN) 4 mg in 0.9% sodium chloride 250 mL infusion  0.04-0.1 mcg/kg/min IntraVENous TITRATE Lian Michael MD   Stopped at 01/17/20 0700  
 amiodarone (CORDARONE) tablet 200 mg  200 mg Oral Q12H Paramjit Castro MD   200 mg at 01/11/20 1157  [Held by provider] metoprolol tartrate (LOPRESSOR) tablet 25 mg  25 mg Oral Q12H Paramjit Castro MD   Stopped at 01/11/20 6696  
 atorvastatin (LIPITOR) tablet 80 mg  80 mg Oral QHS Paramjit Castro MD   80 mg at 01/17/20 2026  
 insulin regular (NOVOLIN R, HUMULIN R) 100 Units in 0.9% sodium chloride 100 mL infusion  1 Units/hr IntraVENous TITRATE Pepe Ramirez MD 5 mL/hr at 01/18/20 0648 5 Units/hr at 01/18/20 8306  dextrose (D50W) injection syrg 12.5 g  25 mL IntraVENous PRN Pepe Ramirez MD      
 [Held by provider] aspirin chewable tablet 81 mg  81 mg Oral DAILY Pepe Ramirez MD   Stopped at 01/17/20 6947  
 magnesium sulfate 1 g/100 ml IVPB (premix or compounded)  1 g IntraVENous PRN Pepe Ramirez MD      
 midazolam (VERSED) injection 1 mg  1 mg IntraVENous Q1H PRN Pepe Ramirez MD   1 mg at 01/11/20 1570  chlorhexidine (PERIDEX) 0.12 % mouthwash 10 mL  10 mL Oral BID Pepe Ramirez MD   10 mL at 01/18/20 6043  morphine 10 mg/ml injection 4 mg  4 mg IntraVENous Q1H PRN Pepe Ramirez MD      
 Or  
 morphine 10 mg/ml injection 5 mg  5 mg IntraVENous Q1H PRN Pepe Ramirez MD   3 mg at 01/10/20 1531  
 dextrose 5 % - 0.45% NaCl infusion  25 mL/hr IntraVENous CONTINUOUS Pepe Ramirez MD   Stopped at 01/17/20 0700  
 vasopressin (VASOSTRICT) 20 Units in 0.9% sodium chloride 100 mL infusion  0-0.1 Units/min IntraVENous TITRATE Pepe Ramirez MD 12 mL/hr at 01/18/20 1409 0.04 Units/min at 01/18/20 1409  PHENYLephrine (PF)(LEWIS-SYNEPHRINE) 30 mg in 0.9% sodium chloride 250 mL infusion   mcg/min IntraVENous TITRATE Pepe Ramirez MD   Stopped at 01/10/20 1545  sodium bicarbonate (8.4%) injection 50 mEq  50 mEq IntraVENous PRN Pepe Ramirez MD   50 mEq at 01/11/20 0030  famotidine (PF) (PEPCID) 20 mg in 0.9% sodium chloride 10 mL injection  20 mg IntraVENous DAILY Pepe Ramirez MD   20 mg at 01/18/20 0350 OBJECTIVE: 
Patient Vitals for the past 8 hrs: 
 BP Temp Pulse Resp SpO2  
01/18/20 1500 126/65 97.8 °F (36.6 °C) 79 11 100 % 01/18/20 1445   78 9 100 % 01/18/20 1430 136/63  78 10 100 % 01/18/20 1415   79  100 % 01/18/20 1400 134/63  77 19 100 % 01/18/20 1345   77 25 100 % 01/18/20 1336   78 25 100 % 01/18/20 1330 136/62  77 25 100 % /18/20 1315   76 26 100 % /18/20 1301 128/59  77 25 100 % /18/20 1300   77 25 100 % 01/18/20 1245   77 25 100 % /18/20 1231 96/46  78 27 100 % /18/20 1230   78 25 100 % /18/20 1215   77 24 100 % /18/20 1207   76 25 100 % /18/20 1200 137/62  77 (!) 7 100 % /18/20 1145   78 25 100 % /18/20 1130   78 26 100 % /18/20 1129 128/58  78 25 100 % /18/20 1115   77 21 100 % /18/20 1100 127/60  76 20 100 % /18/20 1045   75 25 100 % /18/20 1030 124/58  75 25 100 % /18/20 1015   74 21 99 % /18/20 1000 123/59  75 25 99 % /18/20 0945   94 13 100 % /18/20 0930   77 25 100 % /18/20 0915   77 (!) 7 100 % /18/20 0913   77 20 100 % /18/20 0912   76 9 100 % /18/20 0911   76 25 100 % /18/20 0910   78 20 100 % /18/20 0909   78 17 100 % /18/20 0908   77 (!) 7 100 % /18/20 0907   77 (!) 7 100 % /18/20 0906   77 (!) 7 100 % /18/20 0905   77  100 % /18/20 0904   76 (!) 4 100 % /18/20 0903   75 (!) 7 100 % /18/20 0902   74  100 % /18/20 0901   74 (!) 0 100 % 01/18/20 0900 105/55  74  100 % /18/20 0845 128/46  74 24 100 % /18/20 0830   70 25 100 % /18/20 0826   73 25 100 % /18/20 0815   70 26 100 % /18/20 0800 115/58  71 25 100 % 20 0745   70 25 100 % 20 0730   70 25 100 % 20 0715   69 25 100 % Temp (24hrs), Av.5 °F (36.9 °C), Min:97.8 °F (36.6 °C), Max:98.8 °F (37.1 °C) 
 
 07 -  1900 In: 8821 [I.V.:1116] Out: 1335 [Urine:69] Physical Exam: 
Constitutional: Well developed, well nourished, acutely ill-appearing male in no acute distress, lying comfortably in the hospital bed. HEENT: Normocephalic and atraumatic. Sclerae anicteric. Neck supple. Skin Warm and dry. Minimal ecchymosis on bilateral upper and lower extremities. + pallor. Respiratory Lungs decreased with crackles in bases. Normal air exchange without accessory muscle use. CVS Normal rate, regular rhythm and normal S1 and S2. No murmurs, gallops, or rubs. Abdomen Soft, nontender and nondistended, normoactive bowel sounds. Neuro Sedated/ventilated. MSK Generalized anasarca. Toes mottled bilaterally. Psych Sedated/ventilated. Labs:   
Recent Results (from the past 24 hour(s)) GLUCOSE, POC Collection Time: 01/17/20  3:16 PM  
Result Value Ref Range Glucose (POC) 189 (H) 65 - 100 mg/dL BILIRUBIN, FRACTIONATED Collection Time: 01/17/20  5:05 PM  
Result Value Ref Range Bilirubin, total 0.6 0.2 - 1.1 MG/DL Bilirubin, direct 0.3 <0.4 MG/DL Bilirubin, indirect 0.3 0.0 - 1.1 MG/DL  
GLUCOSE, POC Collection Time: 01/17/20  5:49 PM  
Result Value Ref Range Glucose (POC) 180 (H) 65 - 100 mg/dL PTT Collection Time: 01/17/20  6:05 PM  
Result Value Ref Range aPTT 80.5 (H) 24.7 - 39.8 SEC  
GLUCOSE, POC Collection Time: 01/17/20  7:40 PM  
Result Value Ref Range Glucose (POC) 166 (H) 65 - 100 mg/dL POTASSIUM Collection Time: 01/17/20  9:59 PM  
Result Value Ref Range Potassium 4.3 3.5 - 5.1 mmol/L  
PTT Collection Time: 01/17/20  9:59 PM  
Result Value Ref Range aPTT 90.3 (H) 24.7 - 39.8 SEC  
GLUCOSE, POC Collection Time: 01/17/20 10:02 PM  
Result Value Ref Range Glucose (POC) 164 (H) 65 - 100 mg/dL GLUCOSE, POC Collection Time: 01/18/20  1:15 AM  
Result Value Ref Range Glucose (POC) 152 (H) 65 - 100 mg/dL POC G3 Collection Time: 01/18/20  3:15 AM  
Result Value Ref Range Device: VENT    
 FIO2 (POC) 70 % pH (POC) 7.395 7.35 - 7.45    
 pCO2 (POC) 44.1 35 - 45 MMHG  
 pO2 (POC) 221 (H) 75 - 100 MMHG  
 HCO3 (POC) 27.0 (H) 22 - 26 MMOL/L  
 sO2 (POC) 100 (H) 95 - 98 %  Base excess (POC) 2 mmol/L  
 Mode Pressure regulated volume control Tidal volume 450 ml Set Rate 25 bpm  
 PEEP/CPAP (POC) 14 cmH2O Allens test (POC) NOT APPLICABLE Site DRAWN FROM ARTERIAL LINE Specimen type (POC) ARTERIAL Performed by NazRT   
 CO2, POC 28 MMOL/L Respiratory comment: NurseNotified Exhaled minute volume 10.90 L/min COLLECT TIME 312 GLUCOSE, POC Collection Time: 01/18/20  4:24 AM  
Result Value Ref Range Glucose (POC) 135 (H) 65 - 100 mg/dL RENAL FUNCTION PANEL Collection Time: 01/18/20  4:26 AM  
Result Value Ref Range Sodium 141 136 - 145 mmol/L Potassium 3.8 3.5 - 5.1 mmol/L Chloride 107 98 - 107 mmol/L  
 CO2 27 21 - 32 mmol/L Anion gap 7 7 - 16 mmol/L Glucose 139 (H) 65 - 100 mg/dL BUN 40 (H) 8 - 23 MG/DL Creatinine 3.21 (H) 0.8 - 1.5 MG/DL  
 GFR est AA 25 (L) >60 ml/min/1.73m2 GFR est non-AA 20 (L) >60 ml/min/1.73m2 Calcium 7.8 (L) 8.3 - 10.4 MG/DL Phosphorus 3.2 2.3 - 3.7 MG/DL Albumin 1.9 (L) 3.2 - 4.6 g/dL Yuliya Freeman Collection Time: 01/18/20  4:26 AM  
Result Value Ref Range Vancomycin, random 18.6 UG/ML  
CBC W/O DIFF Collection Time: 01/18/20  4:26 AM  
Result Value Ref Range WBC 9.9 4.3 - 11.1 K/uL  
 RBC 2.41 (L) 4.23 - 5.6 M/uL HGB 7.5 (L) 13.6 - 17.2 g/dL HCT 23.8 (L) 41.1 - 50.3 % MCV 98.8 (H) 79.6 - 97.8 FL  
 MCH 31.1 26.1 - 32.9 PG  
 MCHC 31.5 31.4 - 35.0 g/dL  
 RDW 14.5 11.9 - 14.6 % PLATELET 33 (L) 152 - 450 K/uL MPV 13.3 (H) 9.4 - 12.3 FL ABSOLUTE NRBC 0.05 0.0 - 0.2 K/uL MAGNESIUM Collection Time: 01/18/20  4:26 AM  
Result Value Ref Range Magnesium 2.1 1.8 - 2.4 mg/dL PTT Collection Time: 01/18/20  4:26 AM  
Result Value Ref Range aPTT 84.0 (H) 24.7 - 39.8 SEC DIFFERENTIAL, AUTO Collection Time: 01/18/20  4:26 AM  
Result Value Ref Range NEUTROPHILS 75 43 - 78 % LYMPHOCYTES 8 (L) 13 - 44 %  MONOCYTES 8 4.0 - 12.0 %  
 EOSINOPHILS 9 (H) 0.5 - 7.8 % BASOPHILS 0 0.0 - 2.0 %  
 ABS. NEUTROPHILS 7.2 1.7 - 8.2 K/UL  
 ABS. LYMPHOCYTES 0.7 0.5 - 4.6 K/UL  
 ABS. MONOCYTES 0.8 0.1 - 1.3 K/UL  
 ABS. EOSINOPHILS 0.8 0.0 - 0.8 K/UL  
 ABS. BASOPHILS 0.0 0.0 - 0.2 K/UL  
 DF AUTOMATED IMMATURE GRANULOCYTES 1 0.0 - 5.0 %  
 ABS. IMM. GRANS. 0.1 0.0 - 0.5 K/UL PATHOLOGIST REVIEW SMEARS Collection Time: 01/18/20  4:26 AM  
Result Value Ref Range PATHOLOGIST REVIEW PENDING   
GLUCOSE, POC Collection Time: 01/18/20  6:21 AM  
Result Value Ref Range Glucose (POC) 125 (H) 65 - 100 mg/dL POTASSIUM Collection Time: 01/18/20  8:17 AM  
Result Value Ref Range Potassium 3.8 3.5 - 5.1 mmol/L  
PTT Collection Time: 01/18/20  8:17 AM  
Result Value Ref Range aPTT 77.1 (H) 24.7 - 39.8 SEC  
GLUCOSE, POC Collection Time: 01/18/20  8:20 AM  
Result Value Ref Range Glucose (POC) 113 (H) 65 - 100 mg/dL GLUCOSE, POC Collection Time: 01/18/20 12:11 PM  
Result Value Ref Range Glucose (POC) 222 (H) 65 - 100 mg/dL Imaging: XR CHEST SNGL V [[id number]] Collected: 01/17/20 1009 Order Status: Completed Updated: 01/17/20 1024 Narrative:    
CHEST X-RAY, one view. HISTORY:  Pneumothorax status post open heart surgery, follow-up. TECHNIQUE:  AP upright portable view COMPARISON: Yesterday's exam 
 
FINDINGS:    
Small right apical pneumothorax is even smaller. Increased atelectasis or 
infiltrate at both bases. ET tube dialysis type catheter introducer sheath and 
sternal wires remain. Impression:    
IMPRESSION:  Decreased pneumothorax. Increased atelectasis or infiltrate at both 
bases. XR CHEST SNGL V [[id number]] Order Status: No result XR CHEST SNGL V [[id number]] Collected: 01/16/20 0427 Order Status: Completed Updated: 01/16/20 5827 Narrative:    
Clinical history: Post chest tube removal. 
 
TECHNIQUE: AP portable chest 
 
COMPARISON: Yesterday.  
 
FINDINGS: 
 
 Previous left-sided chest tube is no longer seen. Endotracheal and enteric tubes 
and left-sided subclavian catheter are stable. Right Alexis-Nallely catheter has been 
removed. Right IJ sheath is still present. Postsurgical changes of sternotomy. There is vascular congestion. Bibasilar opacities, likely atelectasis. There is 
suggestion of small right apical pneumothorax. There is tiny left apical 
pneumothorax. Impression:    
IMPRESSION: 
 
1. Previous left chest tube is no longer seen. Tiny right apical pneumothorax. 2. Suggestion of small right apical pneumothorax. 3. Bibasilar atelectasis and vascular congestion, similar to prior. XR CHEST SNGL V [[id number]] Collected: 01/15/20 9161 Order Status: Completed Updated: 01/15/20 7637 Narrative:    
 Portable view of the chest  
 
COMPARISON: Yesterday. CLINICAL HISTORY: Postop. FINDINGS: 
 
Stable postsurgical changes of sternotomy. Endotracheal tube, right Alexis-Nallely 
catheter and left chest tube are stable. Enteric tube is not well-seen. Bibasilar opacities, likely atelectasis. There is vascular congestion. No 
pneumothorax. Cardiomediastinal contour and the surrounding bones are stable. Impression:    
IMPRESSION: 
 
1. Stable post op findings. No significant change in the appearance of the 
lungs. No pneumothorax. XR CHEST SNGL V [[id number]] Collected: 01/14/20 0610 Order Status: Completed Updated: 01/14/20 1273 Narrative:    
 Portable view of the chest  
 
COMPARISON: Yesterday. CLINICAL HISTORY: Postop. FINDINGS: 
 
Stable postsurgical changes of sternotomy. Endotracheal tube, enteric tube, 
right-sided Alexis-Nallely catheter and left chest tube are stable. There is stable 
left subclavian catheter. Cardiac mediastinal contour and the surrounding bones 
are stable. No pneumothorax. There is vascular congestion. Bibasilar opacities, 
likely atelectasis.  
  
Impression:    
IMPRESSION: 
 
 1. Stable post op findings. ET tube tip is in good position. 2. Vascular congestion and bibasilar atelectasis, similar to prior exam.  
XR CHEST SNGL V [[id number]] Collected: 01/13/20 0650 Order Status: Completed Updated: 01/13/20 5273 Narrative:    
 Portable view of the chest  
 
COMPARISON: Yesterday CLINICAL HISTORY: Postop, follow-up. FINDINGS: 
 
Endotracheal tube, enteric tube, right-sided [place]-Nallely catheter, and left chest 
tube are stable. Cardiac silhouette is enlarged, similar to prior exam. 
Bibasilar opacities, likely atelectasis. There is vascular congestion. No 
pneumothorax. Left-sided subclavian catheter is stable. Impression:    
IMPRESSION: 
 
1. Stable post op findings. ET tube tip is in good position. 2. Bibasilar atelectasis and vascular congestion. No pneumothorax. 3. No significant change compared to prior exam.  
XR CHEST SNGL V [[id number]] Collected: 01/12/20 4842 Order Status: Completed Updated: 01/12/20 8480 Narrative:    
 Portable view of the chest  
 
COMPARISON: January 12, 2020. CLINICAL HISTORY: Subclavian line placement FINDINGS: 
 
There is a new left subclavian catheter with the tip in the area of SVC/right 
atrium junction. Right [place]-Nallely catheter, enteric tube and endotracheal tube 
and left-sided chest tube are stable. There is vascular congestion. Bibasilar 
opacities, likely atelectasis. No significant pneumothorax. Cardiomediastinal 
contour and the surrounding bones are stable. Stable postsurgical changes of 
sternotomy. Impression:    
IMPRESSION: 
 
1. New left subclavian catheter. No significant pneumothorax. 2. Vascular congestion and bibasilar atelectasis. XR CHEST SNGL V [[id number]] Collected: 01/12/20 0301 Order Status: Completed Updated: 01/12/20 3455 Narrative:    
 Portable view of the chest  
 
Clinical history: Worsening hypercapnia, on vent COMPARISON: January 11, 2020.  
 
FINDINGS: 
 
 Stable postsurgical changes of sternotomy. Endotracheal tube, enteric tube, 
right-sided [place]-Nallely catheter and left-sided chest tube are stable. There is 
small right apical pneumothorax. There is vascular congestion. Bibasilar 
opacities, likely atelectasis. Cardiomediastinal contour and the surrounding 
bones are stable. Impression:    
IMPRESSION: 
 
1. Small right apical pneumothorax, appearing slightly decreased since the prior 
exam. 
 
2. Vascular congestion and bibasilar atelectasis. XR CHEST SNGL V [[id number]] Order Status: Canceled XR CHEST SNGL V [[id number]] Collected: 01/11/20 1220 Order Status: Completed Updated: 01/11/20 1231 Narrative:    
PORTABLE CHEST, January 11, 2020 at 1200 hours CLINICAL HISTORY:  Follow-up right pneumothorax. COMPARISON:  0359 hours today. FINDINGS:  AP erect image demonstrates persistent small right apical 
pneumothorax.  The heart remains enlarged with pulmonary venous congestion and 
bibasilar atelectasis status post CABG.  Endotracheal, feeding, and left chest 
tubes as well as [place]-Nallely catheter remain in place. Impression:    
IMPRESSION:  NO SIGNIFICANT INTERVAL CHANGE, INCLUDING THE SMALL RIGHT APICAL PNEUMOTHORAX. [address] [place] [address] [[id number]] Collected: 01/11/20 1122 Order Status: Completed Updated: 01/11/20 1127 Narrative:    
ULTRASOUND OF THE KIDNEYS AND BLADDER 
 
CLINICAL HISTORY:  Acute on chronic kidney disease. COMPARISON:  None. FINDINGS: The examination was limited by the patient's morbid obesity as well as 
limited mobility and ability to suspend respirations.  Multiple images from real 
time ultrasound evaluation of the kidneys show that they are normal in size and 
orientation bilaterally.  The right kidney measures 12.3 cm in length while the 
left measures 12.4 cm.  No definite solid renal mass, hydronephrosis, stone, or 
abnormal perinephric collection is seen.  The bladder was not distended at the 
 time of examination. Impression:    
IMPRESSION:  Unremarkable, technically limited examination. XR CHEST SNGL V [[id number]] Collected: 01/11/20 0636 Order Status: Completed Updated: 01/11/20 4049 Narrative:    
 Portable view of the chest  
 
COMPARISON: January 10, 2020 CLINICAL HISTORY: Postop. FINDINGS: 
 
Stable postsurgical changes of sternotomy. Endotracheal tube, enteric tube and 
right-sided [place]-Nallely catheter and left-sided chest tube are stable. There is 
vascular congestion. Bibasilar opacities, likely atelectasis. There is small 
right apical pneumothorax, similar to prior exam. Cardiac mediastinal contour 
and the surrounding bones are stable. Impression:    
IMPRESSION: 
 
1. Stable small right apical pneumothorax. 2. Vascular congestion and bibasilar atelectasis. No change in the appearance of 
the lungs. XR CHEST SNGL V [[id number]] Collected: 01/10/20 1617 Order Status: Completed Updated: 01/10/20 1623 Narrative:    
Chest, one view, 1/10/2020 Indication:Postop cardiovascular surgery Comparison:1/8/2020 There are post median sternotomy changes. Support apparatus including [place]-Nallely 
catheter tip projects over the proximal right pulmonary artery. Endotracheal 
tube about 6 cm above the radha. There is an NG tube tip of which is not 
included on this exam. There is also left-sided chest tube. No pneumothorax. Patient appears to have had an aortic valve f replacement. There is perihilar 
atelectasis and likely a small left effusion. Impression:    
Impression: Recent median sternotomy changes with support apparatus as detailed 
above. Perihilar and likely a small left effusion. ASSESSMENT: 
Problem List  Date Reviewed: 12/6/2019 Codes Class Noted Bilateral pneumothorax ICD-10-CM: J93.9 ICD-9-CM: 512.89  1/17/2020 Thrombocytopenia ([facility] [address] ICD-10-CM: D69.6 ICD-9-CM: 287.5  1/17/2020 Shock ([facility] [address] ICD-10-CM: R57.9 ICD-9-CM: 785.50  1/15/2020 Atrial fibrillation [facility]) ICD-10-CM: I48.91 
ICD-9-CM: 427.31  1/13/2020 Acute renal failure (ARF) (HCC) ICD-10-CM: N17.9 ICD-9-CM: 584.9  1/11/2020 Aortic valve stenosis ICD-10-CM: I35.0 ICD-9-CM: 424.1  1/10/2020 * (Principal) CAD (coronary artery disease) ICD-10-CM: I25.10 ICD-9-CM: 414.00  1/10/2020 Weaning from respirator [facility]) ICD-10-CM: Z99.11 ICD-9-CM: V46.13  1/10/2020 Acute hypoxemic respiratory failure ([facility] ICD-10-CM: J96.01 
ICD-9-CM: 518.81  1/10/2020 S/P CABG x 3 ICD-10-CM: Z95.1 ICD-9-CM: V45.81  1/10/2020 S/P AVR ICD-10-CM: Z95.2 ICD-9-CM: V43.3  1/10/2020 Bilateral carotid artery stenosis ICD-10-CM: I65.23 ICD-9-CM: 433.10, 433.30  11/24/2019 Overview Signed 11/24/2019  8:06 PM by Khris Mena MD  
  1. Carotid Duplex (11/20/19): Bilateral 50-69% ICA stenosis. Type 2 diabetes with nephropathy ([facility] ICD-10-CM: E11.21 
ICD-9-CM: 250.40, 583.81  8/26/2019 Obesity, morbid ([facility] ICD-10-CM: E66.01 
ICD-9-CM: 278.01  10/19/2018 Nonrheumatic aortic valve stenosis ICD-10-CM: I35.0 ICD-9-CM: 424.1  4/13/2018 Overview Addendum 9/18/2019  6:16 PM by Khris Mena MD  
  1. Echo (10/15/18): EF 55-60%. Moderate to severe Aortic Stenosis. MG 31.  PG 52.  DI 0.25. 
2.  Echo (9/11/19):  EF 60-65%. Mild LAE. SEvere AS. MG 40. PG 61. DI=0.23. Mild to moderate MR. DM type 2 (diabetes mellitus, type 2) (HCC) ICD-10-CM: E11.9 ICD-9-CM: 250.00  1/14/2013 Gout ICD-10-CM: M10.9 ICD-9-CM: 274.9  1/14/2013 HTN (hypertension) ICD-10-CM: I10 
ICD-9-CM: 401.9  1/14/2013 BPH (benign prostatic hyperplasia) ICD-10-CM: N40.0 ICD-9-CM: 600.00  1/14/2013 Seasonal allergic rhinitis ICD-10-CM: J30.2 ICD-9-CM: 477.9  1/14/2013 HLD (hyperlipidemia) ICD-10-CM: E78.5 ICD-9-CM: 272.4  1/14/2013 RECOMMENDATIONS: 
 Thrombocytopenia - Plt 98k on admission, down to 15k 
- marrow suppression from sepsis/shock likely contributing 
- HIT pending - Check DIC labs, hemolysis labs, and peripheral smear - Primary team has changed heparin to Argatroban - Transfuse plt prn 
01/18 Platelets have rebounded on their own, without transfusion to 33k today. DIC labs not impressive - repeat today for completeness. Smear and haptoglobin pending, follow. HIT/SHARON pending. As soon as HIT returns as negative, may stop argatroban as platelets are so low. His worsening thrombocytopenia is likely multifactorial related to recent clinical course, sepsis with transient marrow suppression and increased consumption, along with exposure to various medications including amiodarone.  Expect his platelets to improve over time as his clinical condition improves.  In interim he should be transfused to keep his platelets over 71,916. Plts improving. Anemia - Transfuse prn to keep Hgb >8 
- DEJUAN on CRRT / marrow suppression from sepsis/shock - Tsat 17%, ferritin 557. Check folate, B12, hemolysis labs 01/18 Continue to follow haptoglobin and smear. DEJUAN 
- Neph following, on CRRT Sepsis/Shock 
- on pressor support - On Azactam/Vanc - Cultures NGTD Hypoxia  
- intubated on vent - Pulm following Afib - Cards managing CAD 
- s/p CABG Lab studies and imaging studies were personally reviewed. Thank you for allowing us to participate in the care of Mr. Elijah Rogers. Jelena Hightower NP [facility]ematology & On[facility] [address] [place] [address] Office : [phone] Fax : [phone] Attending Addendum: 
I have personally performed a face to face diagnostic evaluation on this patient. I have reviewed and agree with the care plan as documented above by Bre Blanton N.P.   My findings are as follows: Patient appears lethargic, heart rate regular without murmurs, abdomen is non-tender, bowel sounds are positive. 69 male history of CAD, more recently underwent CABG and AVR 1/10/2020, with subsequent course complicated with ongoing need for ventilatory support, as well as hyper tension necessitating pressors. He is also had worsening renal function needing CRRT since 1/12/2020. Other history significant for A. fib necessitating IV amiodarone use, recently stopped. He also remains on broad-spectrum antibiotics for sepsis. Pathology consulted for worsening thrombocytopenia since 1/14/2020, nadiring to 15,000 yesterday 1/17/2020. Patient has since been started on argatroban drip with concerns for HIT, with testing including PF4 antibody screen as well as SHARON sent out. His platelets today have improved to 33,000. No overt bleeding noticed, and hemoglobin has remained stable. Await results of HIT testing, continue argatroban in interim. Most recent medicine culprit would be amiodarone which has already been stopped. We will continue to follow along. HIT antibody screen positive, then will consider surveillance Dopplers of his limbs as well. Thank you for allowing us to participate in care of this pleasant patient. Please call w any questions. Grace Paredes MD 
[facility]ematology/Oncology [address] Office : [phone] Fax : [phone]

## 2020-01-17 NOTE — PROGRESS NOTES
Follow up CXR due to hx of small biapical PTX as follows: No evidence for expansion. No clear PTX on images on computer. Radiology interp pending.  
 
Shwetha Tejeda MD

## 2020-01-17 NOTE — DIALYSIS
CRRT system clotted after only 4 hours. New setup put on machine, and treatment continued per MD orders with 4 k citrasate. Dialysis RN remains available.

## 2020-01-17 NOTE — PROGRESS NOTES
A follow up visit was made to the patient. Emotional support, spiritual presence and  
prayer were provided for the patient. EDMOND Gay

## 2020-01-17 NOTE — PROGRESS NOTES
s    [facility] Subjective: A on CKD3   Intubated, sedated Review of Systems -  
 
UTO intubated and sedated Objective: 
 
Vitals:  
 01/17/20 0800 01/17/20 0810 01/17/20 0246 01/17/20 0815 BP: 102/54 100/48 96/53 Pulse: 78 79 80 80 Resp: (!) 0 25 (!) 7 Temp:      
SpO2: 91% 91% 96% 93% Weight:      
Height:      
 
 
PE 
Gen: intubated, multiple pressors. . 
CV:reg rate Chest:bilat breath sounds Abd: soft Ext/Access: Left Subclavian Temp HD cath Suzanne Riser LAB Recent Labs  
  01/17/20 
0400 01/15/20 
0410 WBC 10.6 12.5* HGB 7.8* 9.3* HCT 24.4* 29.1*  
PLT 37* 84* Recent Labs  
  01/17/20 
0400 01/16/20 
2040 01/16/20 
1421  01/16/20 
0238  01/15/20 
0309   --   --   --  141  --  137  
K 3.6 3.4* 3.8   < > 4.1   < > 4.3   --   --   --  105  --  104 CO2 27  --   --   --  32  --  28  
*  --   --   --  120*  --  112* BUN 35*  --   --   --  14  --  13  
CREA 3.18*  --   --   --  1.61*  --  1.73* MG  --   --   --   --   --   --  1.8 PHOS 3.3  --   --   --  2.6  --  2.4 CA 7.8*  --   --   --  8.1*  --  8.2* ALB 1.5*  --   --   --  1.9*  --  2.1* TBILI  --   --   --   --   --   --  0.7 ALT  --   --   --   --   --   --  120* SGOT  --   --   --   --   --   --  166*  
 < > = values in this interval not displayed. Radiology A/P:  
Patient Active Problem List  
Diagnosis Code  DM type 2 (diabetes mellitus, type 2) (HCC) E11.9  
 Gout M10.9  
 HTN (hypertension) I10  
 BPH (benign prostatic hyperplasia) N40.0  Seasonal allergic rhinitis J30.2  
 HLD (hyperlipidemia) E78.5  Nonrheumatic aortic valve stenosis I35.0  Obesity, morbid ([facility] [address] E66.01  
 Type 2 diabetes with nephropathy (HCC) E11.21  
 Bilateral carotid artery stenosis I65.23  
 Aortic valve stenosis I35.0  
 CAD (coronary artery disease) I25.10  Weaning from respirator [facility]) Z99.11  
 Acute hypoxemic respiratory failure ([facility] [address] J96.01  
  S/P CABG x 3 Z95.1  
 S/P AVR Z95.2  Acute renal failure (ARF) (HCC) N17.9  Atrial fibrillation ([address] I48.91  Shock ([address] R57.9  Bilateral pneumothorax J93.9  Thrombocytopenia ([address] D69.6 A on CKD - Non-olguric. Renal US unremarkable. Seen on SED, reducing UF for hypotension. Will add albumin. Will get blood later. ASHD/ AS - s/p CABG  Aortic Valve replacement DM 
 
HTN/ Volume - UF as tolerated Resp Failure - Hypoxic Resp failure from pulmonary edema on vent. Anemia - transfuse, add Procrit and check iron stores. No obvious bleeding Low platelets - hold heparin and switch to Citrasate until HIT panel results Hailey Downing MD

## 2020-01-17 NOTE — PROGRESS NOTES
Ventilator check complete; patient has a #8. 0 ET tube secured at the 25 at the lip. Patient is sedated. Patient is not able to follow commands. Breath sounds are diminished. Trachea is midline, Negative for subcutaneous air, and chest excursion is symmetric. Patient is also Negative for cyanosis and is Negative for pitting edema. All alarms are set and audible. Resuscitation bag is at the head of the bed. Ventilator Settings Mode FIO2 Rate Tidal Volume Pressure PEEP I:E Ratio PRVC  40 %    450 ml  10 cm H2O  14 cm H20  1:2   
 
Peak airway pressure: 30.2 cm H2O Minute ventilation: 10.8 l/min ABG: No results for input(s): PH, PCO2, PO2, HCO3 in the last 72 hours.  
 
 
Nabil Leong, RT

## 2020-01-17 NOTE — PROGRESS NOTES
Per Mono Davis in Laboratory, platelet count is 91,554. Primary RN Aislinn Robles and charge RN Ludivina vargas.

## 2020-01-18 NOTE — PROGRESS NOTES
CV Progress Note Subjective: Incisional pain:  moderate Appetite:  has been on tube feedings Activity:   sedated on vent Objective:  
 
Vitals: 
Blood pressure 127/60, pulse 76, temperature 98.8 °F (37.1 °C), resp. rate 20, height 5' 10\" (1.778 m), weight 334 lb 10.5 oz (151.8 kg), SpO2 100 %. Temp (24hrs), Av.7 °F (37.1 °C), Min:98.6 °F (37 °C), Max:98.8 °F (37.1 °C) Plan/Recommendations/Medical Decision Making:  
 
Principal Problem: 
  CAD (coronary artery disease) (1/10/2020) Active Problems: Aortic valve stenosis (1/10/2020) Weaning from respirator [facility]) (1/10/2020) Acute hypoxemic respiratory failure ([address] (1/10/2020) S/P CABG x 3 (1/10/2020) S/P AVR (1/10/2020) Acute renal failure (ARF) ([address] (2020) Atrial fibrillation ([address] (2020) Shock ([address] (1/15/2020) Bilateral pneumothorax (2020) Thrombocytopenia ([address] (2020) Continue present treatment On CRRT today. Continue to try to wean pressors See orders for details. Pavan Carlson.  Rocio Beasley MD

## 2020-01-18 NOTE — PROGRESS NOTES
[facility] CARDIOLOGY PROGRESS NOTE 
      
 
1/18/2020 9:19 AM 
 
Admit Date: 1/10/2020 Admit Diagnosis: Atherosclerosis of native coronary artery of native heart with unstable angina pectoris ([address] [I25.110]; Nonrheumatic aortic valve stenosis [I35.0];CAD (coronary artery disease) [I25.10]; Aortic valve stenosis [I35.0] Subjective: Intubated, sedated Interval History: (History of pertinent interval events obtained from nursing staff) Remains on pressors, attempting CVVHD this AM with difficulty ROS: 
Unable to obtain 2/2 intubated sedated status Objective:  
 
Vitals:  
 01/18/20 7148 01/18/20 0912 01/18/20 0913 01/18/20 0915 BP:      
Pulse: 76 76 77 77 Resp: 25 9 20 (!) 7 Temp:      
SpO2: 100% 100% 100% 100% Weight:      
Height:      
 
 
Physical Exam: 
General- intubated, sedated Neck- supple CV- irregular, distant S1, S2 
Lung- decreased BS with crackles Abd- soft Ext- extensive edema bilaterally. Skin- warm and dry Current Facility-Administered Medications Medication Dose Route Frequency  aztreonam (AZACTAM) 500 mg in 0.9% sodium chloride 100 mL IVPB  500 mg IntraVENous Q12H  
 [START ON 1/19/2020] Vancomycin Random Level Reminder   Other ONCE  
 albumin human 25% (BUMINATE) solution 12.5 g  12.5 g IntraVENous Q6H  
 epoetin maritza-epbx (RETACRIT) 14,000 Units combo injection  14,000 Units SubCUTAneous Q7D  
 argatroban 50 mg in 0.9% sodium chloride 50 mL (1000 mcg/mL) infusion  0.5-10 mcg/kg/min IntraVENous TITRATE  alcohol 62% (NOZIN) nasal  1 Ampule  1 Ampule Topical Q12H  
 NUTRITIONAL SUPPORT ELECTROLYTE PRN ORDERS   Does Not Apply PRN  
 NOREPINephrine (LEVOPHED) 16,000 mcg in dextrose 5% 250 mL infusion  0.05-0.2 mcg/kg/min IntraVENous TITRATE  amiodarone (CORDARONE) 450 mg in dextrose 5% 250 mL infusion  0.5 mg/min IntraVENous CONTINUOUS  Vancomycin Intermittent Dosing Placeholder. Other Rx Dosing/Monitoring  0.9% sodium chloride infusion 250 mL  250 mL IntraVENous PRN  
 metroNIDAZOLE (FLAGYL) IVPB premix 500 mg  500 mg IntraVENous Q12H  
 albuterol (PROVENTIL VENTOLIN) nebulizer solution 2.5 mg  2.5 mg Nebulization QID PRN  
 fentaNYL in normal saline (pf) 25 mcg/mL infusion  0-200 mcg/hr IntraVENous TITRATE  midazolam (VERSED) 100 mg in 0.9% sodium chloride 100 mL infusion  0-10 mg/hr IntraVENous TITRATE  acetaminophen (TYLENOL) solution 650 mg  650 mg Per NG tube Q4H PRN  
 0.9% sodium chloride infusion  25 mL/hr IntraVENous CONTINUOUS  
 sodium chloride (NS) flush 5-40 mL  5-40 mL IntraVENous Q8H  
 sodium chloride (NS) flush 5-40 mL  5-40 mL IntraVENous PRN  
 oxyCODONE-acetaminophen (PERCOCET) 5-325 mg per tablet 1 Tab  1 Tab Oral Q4H PRN  
 morphine 10 mg/ml injection 3 mg  3 mg IntraVENous Q1H PRN  
 naloxone (NARCAN) injection 0.4 mg  0.4 mg IntraVENous PRN  
 EPINEPHrine (ADRENALIN) 4 mg in 0.9% sodium chloride 250 mL infusion  0.04-0.1 mcg/kg/min IntraVENous TITRATE  [Held by provider] amiodarone (CORDARONE) tablet 200 mg  200 mg Oral Q12H  
 [Held by provider] metoprolol tartrate (LOPRESSOR) tablet 25 mg  25 mg Oral Q12H  
 atorvastatin (LIPITOR) tablet 80 mg  80 mg Oral QHS  insulin regular (NOVOLIN R, HUMULIN R) 100 Units in 0.9% sodium chloride 100 mL infusion  1 Units/hr IntraVENous TITRATE  dextrose (D50W) injection syrg 12.5 g  25 mL IntraVENous PRN  
 [Held by provider] aspirin chewable tablet 81 mg  81 mg Oral DAILY  magnesium sulfate 1 g/100 ml IVPB (premix or compounded)  1 g IntraVENous PRN  
 midazolam (VERSED) injection 1 mg  1 mg IntraVENous Q1H PRN  chlorhexidine (PERIDEX) 0.12 % mouthwash 10 mL  10 mL Oral BID  morphine 10 mg/ml injection 4 mg  4 mg IntraVENous Q1H PRN  Or  
 morphine 10 mg/ml injection 5 mg  5 mg IntraVENous Q1H PRN  
 dextrose 5 % - 0.45% NaCl infusion  25 mL/hr IntraVENous CONTINUOUS  
  vasopressin (VASOSTRICT) 20 Units in 0.9% sodium chloride 100 mL infusion  0-0.1 Units/min IntraVENous TITRATE  PHENYLephrine (PF)(LEWIS-SYNEPHRINE) 30 mg in 0.9% sodium chloride 250 mL infusion   mcg/min IntraVENous TITRATE  sodium bicarbonate (8.4%) injection 50 mEq  50 mEq IntraVENous PRN  
 famotidine (PF) (PEPCID) 20 mg in 0.9% sodium chloride 10 mL injection  20 mg IntraVENous DAILY Data Review:  
Recent Results (from the past 24 hour(s)) GLUCOSE, POC Collection Time: 01/17/20 11:21 AM  
Result Value Ref Range Glucose (POC) 140 (H) 65 - 100 mg/dL FIBRINOGEN Collection Time: 01/17/20  1:50 PM  
Result Value Ref Range Fibrinogen 170 (L) 190 - 501 mg/dL D DIMER Collection Time: 01/17/20  1:50 PM  
Result Value Ref Range D DIMER >20.00 (HH) <0.56 ug/ml(FEU) POTASSIUM Collection Time: 01/17/20  1:51 PM  
Result Value Ref Range Potassium 4.6 3.5 - 5.1 mmol/L  
PTT Collection Time: 01/17/20  1:51 PM  
Result Value Ref Range aPTT 39.4 24.7 - 39.8 SEC PROTHROMBIN TIME + INR Collection Time: 01/17/20  1:51 PM  
Result Value Ref Range Prothrombin time 19.3 (H) 11.7 - 14.5 sec INR 1.6 HEPATIC FUNCTION PANEL Collection Time: 01/17/20  1:51 PM  
Result Value Ref Range Protein, total 5.1 (L) 6.3 - 8.2 g/dL Albumin 1.7 (L) 3.2 - 4.6 g/dL Globulin 3.4 2.3 - 3.5 g/dL A-G Ratio 0.5 (L) 1.2 - 3.5 Bilirubin, total 0.7 0.2 - 1.1 MG/DL Bilirubin, direct 0.3 <0.4 MG/DL Alk. phosphatase 102 50 - 136 U/L  
 AST (SGOT) 55 (H) 15 - 37 U/L  
 ALT (SGPT) 55 12 - 65 U/L  
PLATELET COUNT Collection Time: 01/17/20  1:51 PM  
Result Value Ref Range PLATELET 15 (LL) 836 - 450 K/uL LD Collection Time: 01/17/20  1:51 PM  
Result Value Ref Range  (H) 110 - 210 U/L  
RETICULOCYTE COUNT Collection Time: 01/17/20  1:51 PM  
Result Value Ref Range  Reticulocyte count 4.6 (H) 0.3 - 2.0 %  
 Absolute Retic Cnt. 0.1267 (H) 0.026 - 0.095 M/ul Immature Retic Fraction 39.3 (H) 2.3 - 13.4 % Retic Hgb Conc. 26 (L) 29 - 35 pg  
FOLATE Collection Time: 01/17/20  1:51 PM  
Result Value Ref Range Folate 19.6 (H) 3.1 - 17.5 ng/mL VITAMIN B12 Collection Time: 01/17/20  1:51 PM  
Result Value Ref Range Vitamin B12 1,443 (H) 193 - 986 pg/mL GLUCOSE, POC Collection Time: 01/17/20  2:02 PM  
Result Value Ref Range Glucose (POC) 160 (H) 65 - 100 mg/dL GLUCOSE, POC Collection Time: 01/17/20  3:16 PM  
Result Value Ref Range Glucose (POC) 189 (H) 65 - 100 mg/dL BILIRUBIN, FRACTIONATED Collection Time: 01/17/20  5:05 PM  
Result Value Ref Range Bilirubin, total 0.6 0.2 - 1.1 MG/DL Bilirubin, direct 0.3 <0.4 MG/DL Bilirubin, indirect 0.3 0.0 - 1.1 MG/DL  
GLUCOSE, POC Collection Time: 01/17/20  5:49 PM  
Result Value Ref Range Glucose (POC) 180 (H) 65 - 100 mg/dL PTT Collection Time: 01/17/20  6:05 PM  
Result Value Ref Range aPTT 80.5 (H) 24.7 - 39.8 SEC  
GLUCOSE, POC Collection Time: 01/17/20  7:40 PM  
Result Value Ref Range Glucose (POC) 166 (H) 65 - 100 mg/dL POTASSIUM Collection Time: 01/17/20  9:59 PM  
Result Value Ref Range Potassium 4.3 3.5 - 5.1 mmol/L  
PTT Collection Time: 01/17/20  9:59 PM  
Result Value Ref Range aPTT 90.3 (H) 24.7 - 39.8 SEC  
GLUCOSE, POC Collection Time: 01/17/20 10:02 PM  
Result Value Ref Range Glucose (POC) 164 (H) 65 - 100 mg/dL GLUCOSE, POC Collection Time: 01/18/20  1:15 AM  
Result Value Ref Range Glucose (POC) 152 (H) 65 - 100 mg/dL POC G3 Collection Time: 01/18/20  3:15 AM  
Result Value Ref Range Device: VENT    
 FIO2 (POC) 70 % pH (POC) 7.395 7.35 - 7.45    
 pCO2 (POC) 44.1 35 - 45 MMHG  
 pO2 (POC) 221 (H) 75 - 100 MMHG  
 HCO3 (POC) 27.0 (H) 22 - 26 MMOL/L  
 sO2 (POC) 100 (H) 95 - 98 % Base excess (POC) 2 mmol/L Mode Pressure regulated volume control Tidal volume 450 ml Set Rate 25 bpm  
 PEEP/CPAP (POC) 14 cmH2O Allens test (POC) NOT APPLICABLE Site DRAWN FROM ARTERIAL LINE Specimen type (POC) ARTERIAL Performed by DivinenoNadineRT   
 CO2, POC 28 MMOL/L Respiratory comment: NurseNotified Exhaled minute volume 10.90 L/min COLLECT TIME 312 GLUCOSE, POC Collection Time: 01/18/20  4:24 AM  
Result Value Ref Range Glucose (POC) 135 (H) 65 - 100 mg/dL RENAL FUNCTION PANEL Collection Time: 01/18/20  4:26 AM  
Result Value Ref Range Sodium 141 136 - 145 mmol/L Potassium 3.8 3.5 - 5.1 mmol/L Chloride 107 98 - 107 mmol/L  
 CO2 27 21 - 32 mmol/L Anion gap 7 7 - 16 mmol/L Glucose 139 (H) 65 - 100 mg/dL BUN 40 (H) 8 - 23 MG/DL Creatinine 3.21 (H) 0.8 - 1.5 MG/DL  
 GFR est AA 25 (L) >60 ml/min/1.73m2 GFR est non-AA 20 (L) >60 ml/min/1.73m2 Calcium 7.8 (L) 8.3 - 10.4 MG/DL Phosphorus 3.2 2.3 - 3.7 MG/DL Albumin 1.9 (L) 3.2 - 4.6 g/dL Verta Luz Collection Time: 01/18/20  4:26 AM  
Result Value Ref Range Vancomycin, random 18.6 UG/ML  
CBC W/O DIFF Collection Time: 01/18/20  4:26 AM  
Result Value Ref Range WBC 9.9 4.3 - 11.1 K/uL  
 RBC 2.41 (L) 4.23 - 5.6 M/uL HGB 7.5 (L) 13.6 - 17.2 g/dL HCT 23.8 (L) 41.1 - 50.3 % MCV 98.8 (H) 79.6 - 97.8 FL  
 MCH 31.1 26.1 - 32.9 PG  
 MCHC 31.5 31.4 - 35.0 g/dL  
 RDW 14.5 11.9 - 14.6 % PLATELET 33 (L) 209 - 450 K/uL MPV 13.3 (H) 9.4 - 12.3 FL ABSOLUTE NRBC 0.05 0.0 - 0.2 K/uL MAGNESIUM Collection Time: 01/18/20  4:26 AM  
Result Value Ref Range Magnesium 2.1 1.8 - 2.4 mg/dL PTT Collection Time: 01/18/20  4:26 AM  
Result Value Ref Range aPTT 84.0 (H) 24.7 - 39.8 SEC  
GLUCOSE, POC Collection Time: 01/18/20  6:21 AM  
Result Value Ref Range Glucose (POC) 125 (H) 65 - 100 mg/dL POTASSIUM Collection Time: 01/18/20  8:17 AM  
Result Value Ref Range Potassium 3.8 3.5 - 5.1 mmol/L  
PTT Collection Time: 01/18/20  8:17 AM  
Result Value Ref Range aPTT 77.1 (H) 24.7 - 39.8 SEC  
GLUCOSE, POC Collection Time: 01/18/20  8:20 AM  
Result Value Ref Range Glucose (POC) 113 (H) 65 - 100 mg/dL EKG:  (EKG has been independently visualized by me with interpretation below) Assessment:  
 
Principal Problem: 
  CAD (coronary artery disease) (1/10/2020) Active Problems: Aortic valve stenosis (1/10/2020) Weaning from respirator [facility]) (1/10/2020) Acute hypoxemic respiratory failure ([address] (1/10/2020) S/P CABG x 3 (1/10/2020) S/P AVR (1/10/2020) Acute renal failure (ARF) ([address] (1/11/2020) Atrial fibrillation ([address] (1/13/2020) Shock ([address] (1/15/2020) Bilateral pneumothorax (1/17/2020) Thrombocytopenia ([address] (1/17/2020) Plan:  
1) CAD - post CABG on asa/statin 2) Shock - remains on pressors 3) s/p AVR 4) DEJUAN - attempting CRRT this AM, difficulty with possible clotting 5) Hypoxia/vent dep - ween per pulm/ICU tea 6) Afib - continue IV amiodarone 05 mg/min in and out of afib, has pacer wires still inplace no OAC with recent surgery 7) Anemia - worsening H/H this AM 
8) thrombocytopenia: unclear etiology, slightly low for HIT, but panel pending Leny Alberts MD 
Cardiology/Electrophysiology

## 2020-01-18 NOTE — PROGRESS NOTES
Cardiovascular ICU Progress Note: 1/18/2020 Beth Christianson Admission Date: 1/10/2020 Patient had CABG x 3 and AVR . Slow to improve. Had small ptx that self resolved. 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 The patient's chart is reviewed and the patient is discussed with the staff. Subjective:  
Platelets are 19O this morning. Positive fluid balance of 763ml. Hgb 7.5 Remains on 0.14 mcg/kg/min Lack of effort when switched to spontaneous mode this morning Opens eyes somewhat with stimulation Getting albumin Review of Systems Review of systems not obtained due to patient factors. -- sedated and unresponsive. Allergies Allergen Reactions  Heparin Other (comments) HIT  Amoxicillin Rash  Keflex [Cephalexin] Rash  Pcn [Penicillins] Other (comments) \"felt closed in\". No rash Current Facility-Administered Medications Medication Dose Route Frequency  albumin human 25% (BUMINATE) solution 12.5 g  12.5 g IntraVENous Q6H  
 epoetin maritza-epbx (RETACRIT) 14,000 Units combo injection  14,000 Units SubCUTAneous Q7D  
 argatroban 50 mg in 0.9% sodium chloride 50 mL (1000 mcg/mL) infusion  0.5-10 mcg/kg/min IntraVENous TITRATE  alcohol 62% (NOZIN) nasal  1 Ampule  1 Ampule Topical Q12H  
 NUTRITIONAL SUPPORT ELECTROLYTE PRN ORDERS   Does Not Apply PRN  
 NOREPINephrine (LEVOPHED) 16,000 mcg in dextrose 5% 250 mL infusion  0.05-0.2 mcg/kg/min IntraVENous TITRATE  amiodarone (CORDARONE) 450 mg in dextrose 5% 250 mL infusion  0.5 mg/min IntraVENous CONTINUOUS  Vancomycin Intermittent Dosing Placeholder. Other Rx Dosing/Monitoring  0.9% sodium chloride infusion 250 mL  250 mL IntraVENous PRN  
 metroNIDAZOLE (FLAGYL) IVPB premix 500 mg  500 mg IntraVENous Q12H  aztreonam (AZACTAM) 2 g in 0.9% sodium chloride (MBP/ADV) 100 mL  2 g IntraVENous Q12H  
 albuterol (PROVENTIL VENTOLIN) nebulizer solution 2.5 mg  2.5 mg Nebulization QID PRN  
 fentaNYL in normal saline (pf) 25 mcg/mL infusion  0-200 mcg/hr IntraVENous TITRATE  midazolam (VERSED) 100 mg in 0.9% sodium chloride 100 mL infusion  0-10 mg/hr IntraVENous TITRATE  acetaminophen (TYLENOL) solution 650 mg  650 mg Per NG tube Q4H PRN  
 0.9% sodium chloride infusion  25 mL/hr IntraVENous CONTINUOUS  
 sodium chloride (NS) flush 5-40 mL  5-40 mL IntraVENous Q8H  
 sodium chloride (NS) flush 5-40 mL  5-40 mL IntraVENous PRN  
 oxyCODONE-acetaminophen (PERCOCET) 5-325 mg per tablet 1 Tab  1 Tab Oral Q4H PRN  
 morphine 10 mg/ml injection 3 mg  3 mg IntraVENous Q1H PRN  
 naloxone (NARCAN) injection 0.4 mg  0.4 mg IntraVENous PRN  
 EPINEPHrine (ADRENALIN) 4 mg in 0.9% sodium chloride 250 mL infusion  0.04-0.1 mcg/kg/min IntraVENous TITRATE  [Held by provider] amiodarone (CORDARONE) tablet 200 mg  200 mg Oral Q12H  
 [Held by provider] metoprolol tartrate (LOPRESSOR) tablet 25 mg  25 mg Oral Q12H  
 atorvastatin (LIPITOR) tablet 80 mg  80 mg Oral QHS  insulin regular (NOVOLIN R, HUMULIN R) 100 Units in 0.9% sodium chloride 100 mL infusion  1 Units/hr IntraVENous TITRATE  dextrose (D50W) injection syrg 12.5 g  25 mL IntraVENous PRN  
 [Held by provider] aspirin chewable tablet 81 mg  81 mg Oral DAILY  magnesium sulfate 1 g/100 ml IVPB (premix or compounded)  1 g IntraVENous PRN  
 midazolam (VERSED) injection 1 mg  1 mg IntraVENous Q1H PRN  chlorhexidine (PERIDEX) 0.12 % mouthwash 10 mL  10 mL Oral BID  morphine 10 mg/ml injection 4 mg  4 mg IntraVENous Q1H PRN  Or  
 morphine 10 mg/ml injection 5 mg  5 mg IntraVENous Q1H PRN  
 dextrose 5 % - 0.45% NaCl infusion  25 mL/hr IntraVENous CONTINUOUS  
 vasopressin (VASOSTRICT) 20 Units in 0.9% sodium chloride 100 mL infusion  0-0.1 Units/min IntraVENous TITRATE  PHENYLephrine (PF)(LEWIS-SYNEPHRINE) 30 mg in 0.9% sodium chloride 250 mL infusion   mcg/min IntraVENous TITRATE  sodium bicarbonate (8.4%) injection 50 mEq  50 mEq IntraVENous PRN  
 famotidine (PF) (PEPCID) 20 mg in 0.9% sodium chloride 10 mL injection  20 mg IntraVENous DAILY Objective:  
 
Vitals:  
 01/18/20 0346 01/18/20 0400 01/18/20 0401 01/18/20 0430 BP: 110/53 114/57  112/56 Pulse: 75 72  71 Resp: 25 25  25 Temp:      
SpO2: 100% 100%  100% Weight:   334 lb 10.5 oz (151.8 kg) Height:      
 
Blood pressure 112/56, pulse 71, temperature 98.6 °F (37 °C), resp. rate 25, height 5' 10\" (1.778 m), weight 334 lb 10.5 oz (151.8 kg), SpO2 100 %. Intake and Output:  
01/16 0701 - 01/17 1900 In: 3538.7 [I.V.:2141.7] Out: 1045 [Urine:222] 01/17 1901 - 01/18 0700 In: 220 Out: 85 [Urine:85] Physical Exam:         
Constitutional:  Sedated, orally intubated and mechanically ventilated. EENMT:  Sclera clear, pupils equal, oral mucosa moist and orally intubated Respiratory: clearer today with b/l breath sounds. Cardiovascular:  RRR with no M,G,R; 
Gastrointestinal:  soft; no bowel sounds present Musculoskeletal:  warm with no cyanosis, +2  lower extremity edema. [place] site  leg with ace wrap. SKIN:  no jaundice or ecchymosis Neurologic:   no gross neuro deficits Psychiatric:  Sedated and comfortable. CXR:   1/18: atelectasis and pulmonary vascular congestion noted LINES:  ETT, brown, swan shreri, arterial line, chest tubes times 2 in epigastric area without air leak. DRIPS: 
Levo gtt Argatroban Insulin 
amio Ventilator Settings Mode FIO2 Rate Tidal Volume Pressure PEEP PRVC  60 %(weaned post ABG)    450 ml  10 cm H2O  14 cm H20 Peak airway pressure: 33 cm H2O Minute ventilation: 10.9 l/min ABG:  
Recent Labs  
  01/18/20 
0315 01/17/20 
0250 PHI 7.395 7.498* PCO2I 44.1 35.5 PO2I 221* 98 HCO3I 27.0* 27.5* LAB Recent Labs  
  01/18/20 
0426 01/17/20 
1351 01/17/20 
0834 01/17/20 
0400 WBC 9.9  --  9.3 10.6 HGB 7.5*  --  8.1* 7.8* HCT 23.8*  --  26.0* 24.4*  
PLT 33* 15* 19* 37* INR  --  1.6  --   --   
 
Recent Labs  
  01/18/20 
0426 01/17/20 
2159 01/17/20 
1351  01/17/20 
0400  01/16/20 
0238   --   --   --  141  --  141  
K 3.8 4.3 4.6   < > 3.6   < > 4.1   --   --   --  106  --  105 CO2 27  --   --   --  27  --  32 *  --   --   --  133*  --  120* BUN 40*  --   --   --  35*  --  14  
CREA 3.21*  --   --   --  3.18*  --  1.61* MG 2.1  --   --   --   --   --   --   
PHOS 3.2  --   --   --  3.3  --  2.6 CA 7.8*  --   --   --  7.8*  --  8.1* ALB 1.9*  --  1.7*  --  1.5*  --  1.9*  
SGOT  --   --  55*  --   --   --   --   
 < > = values in this interval not displayed. Assessment and Plan :  (Medical Decision Making) Hospital Problems  Date Reviewed: 12/6/2019 Codes Class Noted POA Shock ([facility] [address] still on pressors  ICD-10-CM: R57.9 ICD-9-CM: 785.50  1/15/2020 Unknown Atrial fibrillation [facility]) ICD-10-CM: I48.91 
ICD-9-CM: 427.31  1/13/2020 Unknown On amiodarone Acute renal failure (ARF) ([facility]) ICD-10-CM: N17.9 ICD-9-CM: 584.9  1/11/2020 Unknown Not tolerating dialysis well Aortic valve stenosis ICD-10-CM: I35.0 ICD-9-CM: 424.1  1/10/2020 Unknown * (Principal) CAD (coronary artery disease) ICD-10-CM: I25.10 ICD-9-CM: 414.00  1/10/2020 Unknown Weaning from respirator [facility]) still on vent  ICD-10-CM: Z99.11 ICD-9-CM: V46.13  1/10/2020 Unknown Acute hypoxemic respiratory failure ([facility] [address] ICD-10-CM: J96.01 
ICD-9-CM: 518.81  1/10/2020 S/P CABG x 3 ICD-10-CM: Z95.1 ICD-9-CM: V45.81  1/10/2020 Unknown S/P AVR ICD-10-CM: Z95.2 ICD-9-CM: V43.3  1/10/2020 Unknown Thrombocytopenia 
--d-dimer >20, fibrinogen 170, INR 1.6 69  y old morbidly obese s/p AVR and CABG x3. Still hypotensive, lethargic, not yet breathing spontaneously. Plan:  
 
--try to wean to pressure support today if awakens more 
--continue pressors to keep MAP > 65 and per PMD 
--f/u with hematology about HIT vs DIC. 
--continue CRRT per renal -- likely needs some fluid removal to help extubate but keeps clotting off. 
--on abx D7 with cultures negative. Should complete today 
--continue aggressive supportive care per CV surgery More than 50% of the time documented was spent in face-to-face contact with the patient and in the care of the patient on the floor/unit where the patient is located.  
 
Martin Gambino MD

## 2020-01-18 NOTE — PROGRESS NOTES
Ventilator check complete; patient has a #8. 0 ET tube secured at the 25 at the lip. Patient is not sedated. Patient is able to follow commands. Breath sounds are diminished. Trachea is midline, Negative for subcutaneous air, and chest excursion is symmetric. Patient is also Negative for cyanosis and is Negative for pitting edema. All alarms are set and audible. Resuscitation bag is at the head of the bed. Ventilator Settings Mode FIO2 Rate Tidal Volume Pressure PEEP I:E Ratio PRVC  60 %(weaned post ABG)    450 ml  10 cm H2O  14 cm H20  1:2   
 
Peak airway pressure: 33 cm H2O Minute ventilation: 10.9 l/min ABG: No results for input(s): PH, PCO2, PO2, HCO3 in the last 72 hours.  
 
 
Ludivina Martins, RT

## 2020-01-18 NOTE — PROGRESS NOTES
Ventilator check complete; patient has a #8. 0 ET tube secured at the 25 at the lip. Patient is sedated. Patient is not able to follow commands. Breath sounds are diminished. Trachea is midline, Negative for subcutaneous air, and chest excursion is symmetric. Patient is also Negative for cyanosis and is Positive for pitting edema. All alarms are set and audible. Resuscitation bag is at the head of the bed. Ventilator Settings Mode FIO2 Rate Tidal Volume Pressure PEEP I:E Ratio PRVC  70 %    450 ml  10 cm H2O  14 cm H20  1:2   
 
Peak airway pressure: 31 cm H2O Minute ventilation: 10.9 l/min ABG: No results for input(s): PH, PCO2, PO2, HCO3 in the last 72 hours.  
 
 
Blane Bahena, RT

## 2020-01-18 NOTE — DIALYSIS
8 HR SLED initiated using left upper chest CVC. Aspirated and flushed both catheter ports without problem. Machine settings per MD order.  [id number]   [id number] Citrasate 3k 2.5CA Heparin none unit bolus and none ml/hr. Reported to primary nurse. Dialysis nurse available as needed.

## 2020-01-18 NOTE — PROGRESS NOTES
s    [facility] Subjective: A on CKD3   Intubated, sedated Review of Systems -  
 
UTO intubated and sedated Objective: 
 
Vitals:  
 01/18/20 0600 01/18/20 0615 01/18/20 0630 01/18/20 0700 BP: 118/58  123/58 123/57 Pulse: 69 69 70 69 Resp: 25 11 25 25 Temp:      
SpO2: 100% 100% 100% 100% Weight:      
Height:      
 
 
PE 
Gen: intubated, multiple pressors. . 
CV:reg rate Chest:bilat breath sounds Abd: soft Ext/Access: Left Subclavian Temp HD cath [facility] Recent Labs  
  01/18/20 
0426 01/17/20 
1351 01/17/20 
0834 01/17/20 
0400 WBC 9.9  --  9.3 10.6 HGB 7.5*  --  8.1* 7.8* HCT 23.8*  --  26.0* 24.4*  
PLT 33* 15* 19* 37* INR  --  1.6  --   --   
 
Recent Labs  
  01/18/20 
0426 01/17/20 
2159 01/17/20 
1705 01/17/20 
1351  01/17/20 
0400  01/16/20 
0238   --   --   --   --  141  --  141  
K 3.8 4.3  --  4.6   < > 3.6   < > 4.1   --   --   --   --  106  --  105 CO2 27  --   --   --   --  27  --  32 *  --   --   --   --  133*  --  120* BUN 40*  --   --   --   --  35*  --  14  
CREA 3.21*  --   --   --   --  3.18*  --  1.61* MG 2.1  --   --   --   --   --   --   --   
PHOS 3.2  --   --   --   --  3.3  --  2.6 CA 7.8*  --   --   --   --  7.8*  --  8.1* ALB 1.9*  --   --  1.7*  --  1.5*  --  1.9* TBILI  --   --  0.6 0.7  --   --   --   --   
ALT  --   --   --  55  --   --   --   --   
SGOT  --   --   --  55*  --   --   --   --   
 < > = values in this interval not displayed. Radiology A/P:  
Patient Active Problem List  
Diagnosis Code  DM type 2 (diabetes mellitus, type 2) (HCC) E11.9  
 Gout M10.9  
 HTN (hypertension) I10  
 BPH (benign prostatic hyperplasia) N40.0  Seasonal allergic rhinitis J30.2  
 HLD (hyperlipidemia) E78.5  Nonrheumatic aortic valve stenosis I35.0  Obesity, morbid ([facility] [address] E66.01  
 Type 2 diabetes with nephropathy (HCC) E11.21  
 Bilateral carotid artery stenosis I65.23  
  Aortic valve stenosis I35.0  
 CAD (coronary artery disease) I25.10  Weaning from respirator [facility]) Z99.11  
 Acute hypoxemic respiratory failure (HCC) J96.01  
 S/P CABG x 3 Z95.1  
 S/P AVR Z95.2  Acute renal failure (ARF) (HCC) N17.9  Atrial fibrillation ([address] I48.91  Shock ([address] R57.9  Bilateral pneumothorax J93.9  Thrombocytopenia ([address] D69.6 A on CKD - Non-olguric. Renal US unremarkable. Increasingly difficult to dialyze. Clotted off seevral times yesterday. Plan to restart this morning. Attempt more aggressive UF. Starting to trickle urine - will give a test dose of Lasix. ASHD/ AS - s/p CABG  Aortic Valve replacement DM 
 
HTN/ Volume - UF as tolerated Resp Failure - Hypoxic Resp failure from pulmonary edema on vent. Anemia - following, transfuse as needed. Low platelets - HIT pending, on Argatroban Hailey Downing MD

## 2020-01-18 NOTE — PROGRESS NOTES
Dual skin assessment performed. Sacrum visualized and is free of injury. A dark purple non blanchable area of concern is present in the gluteal cleft. A split in the gluteal cleft is also present. Multiple areas of redness and petechiae are present on the buttocks and flanks, and outer thighs. Also, the patient has cyanotic digits on UEs and LEs. The patient does have doppler signal pulses in all extremities. The patient is on an airtap system, but again due to hemodynamic instability, turning is limited. The patient has not been turned adequately due to hemodynamic instability. The patient is on max dose levophed, and has been on max dose pressors for multiple days. The ms incision and LLE incisions are intact with preveena dressings covering them.

## 2020-01-19 NOTE — PROGRESS NOTES
CV Progress Note Subjective: Incisional pain:  moderate Appetite:  will start TPN Activity:   sedated on vent Objective:  
 
Vitals: 
Blood pressure 103/53, pulse 70, temperature 98.2 °F (36.8 °C), resp. rate 25, height 5' 10\" (1.778 m), weight 333 lb 12.4 oz (151.4 kg), SpO2 100 %. Temp (24hrs), Av.4 °F (36.9 °C), Min:97.8 °F (36.6 °C), Max:99.3 °F (37.4 °C) Plan/Recommendations/Medical Decision Making:  
 
Principal Problem: 
  CAD (coronary artery disease) (1/10/2020) Active Problems: Aortic valve stenosis (1/10/2020) Weaning from respirator [facility]) (1/10/2020) Acute hypoxemic respiratory failure ([address] (1/10/2020) S/P CABG x 3 (1/10/2020) S/P AVR (1/10/2020) Acute renal failure (ARF) ([address] (2020) Atrial fibrillation ([address] (2020) Shock ([address] (1/15/2020) Bilateral pneumothorax (2020) Thrombocytopenia ([address] (2020) HIT positive Starting TPN Unable to wean pressors Needs trach but unstable Continue present treatment See orders for details. Nithin Leong.  Cj Acuña MD

## 2020-01-19 NOTE — PROGRESS NOTES
Nutrition F/U: Received consult for TPN management per nutritional support protocols. (Dr Cj Acuña) TF management per protocols ( Dr Jojo Doyle) Assessment:  
Food/Nutrition History:  Central line access : Double lumen PICC. Remains on vent and Levophed. Vasopressin resumed 1-18. Remains off Epinephrine. Continues to require CRRT/SLED 
TF residual . 250 ml x 2 (300 ml and 425 ml) so TF has been off since 2300 last night Pertinent Medications: Levophed, vasopressin, insulin gtt. pepcid IVF: NS at 25 ml/hr Pertinent Labs: Mg 1.7-Received Mg bolus this AM 
POC Glucoses:   mg/dl. Has required 48 to 163 units/d of insulin. Expect increased needs with start of TPN. Will not add insulin to TPN as continue address insulin needs with insulin gtt. Hx of DM withPTA medications: 42 units of Lantus bid and 40 units Humalog with evening meal 
Anthropometrics:Height: 5' 10\" (177.8 cm), Weight Source: Bed, Weight: 151.4 kg (333 lb 12.4 oz) Edema: Generalized anasarca, 4+ BUE and BLE Pre-op standing scale weight: 136.6 kg Macronutrient needs:(using UBW (Usual body weight) 136.4 kg and IBW 75.5 kg) EER:  3750-5022 kcal /day (11-14 kcal/kg UBW) 
EPR:  128-151 grams protein/day (1.7-2 grams/kg IBW)-for CRRT Max CHO:   435 grams/day (4mg/kg IBW/min) for TPN vs 238 grams/day (50% kcal) Fluid:  1000-1500ml/d Intake/Comparative Standards: Current NPO and TF on hold does not meet kcal or protein needs Nutrition Diagnosis: Inadequate protein-energy intake r/t hemodynamic instability and TF intolerance as evidenced by requiring 2 pressors and residuals > 250 ml x 2 with a semi-elemental TF formula. Intervention:  
Meals and snacks: NPO Nutritional Supplement Therapy: Electrolyte replacement per nutritional support protocols are active on MAR.  
PN/IVF:  
TPN: Start 1 L/d of 10%DEX/4.25%AA at 45 ml/hr with 250 ml 20% Lipids daily provides 1010 kcal/d (67% of needs), 42.5 grams of protein/d (33% of needs), 100 grams of CHO/d (does not exceed maximum CHO load) and 1330 ml of total volume/d (100% of needs). Lytes/L of TPN: 30 meq NaCl, 10 meq KPO4, 8 Mg, 4.5 meq Calcium Other additives:  MTE, MVI, 20 mg pepcid Labs: Has active order for daily BMP, Phos and Mg MWF. Add Trig in AM. [facility], [address] [place] [address], [address] [place] [address], [address] [place] [address]

## 2020-01-19 NOTE — PROGRESS NOTES
[facility] Subjective: A on CKD3   Intubated, sedated Review of Systems -  
 
UTO intubated and sedated but arousable Objective: 
 
Vitals:  
 01/19/20 0629 01/19/20 0630 01/19/20 0645 01/19/20 0700 BP: 96/52   105/56 Pulse: 79 79 79 79 Resp: 25 25 25 25 Temp:      
SpO2: 100% 100% 100% 100% Weight:      
Height:      
 
 
PE 
Gen: intubated, multiple pressors. . 
CV:reg rate Chest:bilat breath sounds Abd: soft Ext/Access: Left Subclavian Temp HD cath, anasarca Oris Gottlieb LAB Recent Labs  
  01/19/20 
0308 01/18/20 
1601 01/18/20 
1510 01/18/20 
0426 01/17/20 
1351 01/17/20 
4922 WBC 10.6  --   --  9.9  --  9.3 HGB 8.6* 7.6*  --  7.5*  --  8.1* HCT 26.8* 24.6*  --  23.8*  --  26.0*  
PLT 19*  --   --  33* 15* 19* INR  --   --  2.9  --  1.6  --   
 
Recent Labs  
  01/19/20 
0308 01/18/20 
2131 01/18/20 
1510  01/18/20 
0426  01/17/20 
1705 01/17/20 
1351  01/17/20 
0400   --   --   --  141  --   --   --   --  141  
K 3.9 4.0 3.8   < > 3.8   < >  --  4.6   < > 3.6   --   --   --  107  --   --   --   --  106 CO2 29  --   --   --  27  --   --   --   --  27 *  --   --   --  139*  --   --   --   --  133* BUN 13  --   --   --  40*  --   --   --   --  35* CREA 1.35  --   --   --  3.21*  --   --   --   --  3.18* MG 1.7*  --  1.8  --  2.1  --   --   --   --   --   
PHOS  --   --   --   --  3.2  --   --   --   --  3.3 CA 7.7*  --   --   --  7.8*  --   --   --   --  7.8* ALB  --   --   --   --  1.9*  --   --  1.7*  --  1.5* TBILI  --   --   --   --   --   --  0.6 0.7  --   --   
ALT  --   --   --   --   --   --   --  55  --   --   
SGOT  --   --   --   --   --   --   --  55*  --   --   
 < > = values in this interval not displayed. Radiology A/P:  
Patient Active Problem List  
Diagnosis Code  DM type 2 (diabetes mellitus, type 2) (HCC) E11.9  
 Gout M10.9  
 HTN (hypertension) I10  
  BPH (benign prostatic hyperplasia) N40.0  Seasonal allergic rhinitis J30.2  
 HLD (hyperlipidemia) E78.5  Nonrheumatic aortic valve stenosis I35.0  Obesity, morbid ([address] E66.01  
 Type 2 diabetes with nephropathy (HCC) E11.21  
 Bilateral carotid artery stenosis I65.23  
 Aortic valve stenosis I35.0  
 CAD (coronary artery disease) I25.10  Weaning from respirator [facility]) Z99.11  
 Acute hypoxemic respiratory failure (HCC) J96.01  
 S/P CABG x 3 Z95.1  
 S/P AVR Z95.2  Acute renal failure (ARF) ([facility]) N17.9  Atrial fibrillation ([address] I48.91  Shock ([address] R57.9  Bilateral pneumothorax J93.9  Thrombocytopenia ([address] D69.6 A on CKD - Non-olguric. Renal US unremarkable. ASHD/ AS - s/p CABG  Aortic Valve replacement DM 
 
HTN/ Volume - UF as tolerated Resp Failure - Hypoxic Resp failure from pulmonary edema on vent. Anemia - following, transfuse as needed. Low platelets - HIT positive on Argatroban Tolerating SED much better. Continue for as long as able at this point.  
 
 
 
Nikolai Mauro MD

## 2020-01-19 NOTE — PROGRESS NOTES
Patient switched to SIMV (PRVC + PS) with an SIMV rate of 22 @ 2341. Pt had no triggering effort when placed in PS @ beginning of shift, so SIMV used to attempt to get pt to breath over set rate. Update (6540): Patient now awake and breathing over set SIMV rate. Patient attempted in PS trial (14/14) and was able to maintain Ve, but switched back to original [facility] mode due to pt agitation and high HR while in both SIMV & PS. Morning ABG was drawn on SIMV (Vt 450 R 22 PEEP 14 PS 14) with results of pH 7.4 PCO2 47 PO2 144 HCO3 29.5.  
  
Respiratory mechanics obtained in PS trial: RR 28, Ve: 12.6, VT: 631,  RSBI: 44

## 2020-01-19 NOTE — DIALYSIS
CRRT  initiated using left I J catheter. Aspirated and flushed both ports without difficulty. Machine settings per MD order. 200 UFR  300 DFR  200 BFR  4K Citrisate Bath. Reported to primary nurse. Dialysis nurse available as needed.

## 2020-01-19 NOTE — PROGRESS NOTES
[facility] CARDIOLOGY PROGRESS NOTE 
      
 
1/19/2020 8:30 AM 
 
Admit Date: 1/10/2020 Admit Diagnosis: Atherosclerosis of native coronary artery of native heart with unstable angina pectoris ([address] [I25.110]; Nonrheumatic aortic valve stenosis [I35.0];CAD (coronary artery disease) [I25.10]; Aortic valve stenosis [I35.0] Subjective: Intubated, sedated 
  
Interval History: (History of pertinent interval events obtained from nursing staff) Remains on pressors, episode of AF overnight, restarted on IV amio 
  
ROS: 
Unable to obtain 2/2 intubated sedated status Objective:  
 
Vitals:  
 01/19/20 0700 01/19/20 0800 01/19/20 0814 01/19/20 0815 BP: 105/56 109/52 Pulse: 79 75 75 75 Resp: 25 25 25 25 Temp:      
SpO2: 100% 100% 100% 100% Weight:      
Height:      
 
 
Physical Exam: 
General- intubated, sedated Neck- supple CV- irregular, distant S1, S2 
Lung- decreased BS with crackles Abd- soft Ext- extensive edema, volume overload. Skin- warm and dry Current Facility-Administered Medications Medication Dose Route Frequency  amiodarone (CORDARONE) 450 mg in dextrose 5% 250 mL infusion  0.5-1 mg/min IntraVENous TITRATE  dexmedeTOMidine (PRECEDEX) 400 mcg in 0.9% sodium chloride 100 mL infusion  0.2-0.7 mcg/kg/hr IntraVENous TITRATE  
 0.9% sodium chloride infusion 250 mL  250 mL IntraVENous PRN  
 epoetin maritza-epbx (RETACRIT) 14,000 Units combo injection  14,000 Units SubCUTAneous Q7D  
 argatroban 50 mg in 0.9% sodium chloride 50 mL (1000 mcg/mL) infusion  0.5-10 mcg/kg/min IntraVENous TITRATE  alcohol 62% (NOZIN) nasal  1 Ampule  1 Ampule Topical Q12H  
 NUTRITIONAL SUPPORT ELECTROLYTE PRN ORDERS   Does Not Apply PRN  
 NOREPINephrine (LEVOPHED) 16,000 mcg in dextrose 5% 250 mL infusion  0.05-0.2 mcg/kg/min IntraVENous TITRATE  
 0.9% sodium chloride infusion 250 mL  250 mL IntraVENous PRN  
  albuterol (PROVENTIL VENTOLIN) nebulizer solution 2.5 mg  2.5 mg Nebulization QID PRN  
 fentaNYL in normal saline (pf) 25 mcg/mL infusion  0-200 mcg/hr IntraVENous TITRATE  midazolam (VERSED) 100 mg in 0.9% sodium chloride 100 mL infusion  0-10 mg/hr IntraVENous TITRATE  acetaminophen (TYLENOL) solution 650 mg  650 mg Per NG tube Q4H PRN  
 0.9% sodium chloride infusion  25 mL/hr IntraVENous CONTINUOUS  
 sodium chloride (NS) flush 5-40 mL  5-40 mL IntraVENous Q8H  
 sodium chloride (NS) flush 5-40 mL  5-40 mL IntraVENous PRN  
 oxyCODONE-acetaminophen (PERCOCET) 5-325 mg per tablet 1 Tab  1 Tab Oral Q4H PRN  
 morphine 10 mg/ml injection 3 mg  3 mg IntraVENous Q1H PRN  
 naloxone (NARCAN) injection 0.4 mg  0.4 mg IntraVENous PRN  
 EPINEPHrine (ADRENALIN) 4 mg in 0.9% sodium chloride 250 mL infusion  0.04-0.1 mcg/kg/min IntraVENous TITRATE  [Held by provider] amiodarone (CORDARONE) tablet 200 mg  200 mg Oral Q12H  
 [Held by provider] metoprolol tartrate (LOPRESSOR) tablet 25 mg  25 mg Oral Q12H  
 atorvastatin (LIPITOR) tablet 80 mg  80 mg Oral QHS  insulin regular (NOVOLIN R, HUMULIN R) 100 Units in 0.9% sodium chloride 100 mL infusion  1 Units/hr IntraVENous TITRATE  dextrose (D50W) injection syrg 12.5 g  25 mL IntraVENous PRN  
 [Held by provider] aspirin chewable tablet 81 mg  81 mg Oral DAILY  magnesium sulfate 1 g/100 ml IVPB (premix or compounded)  1 g IntraVENous PRN  
 midazolam (VERSED) injection 1 mg  1 mg IntraVENous Q1H PRN  chlorhexidine (PERIDEX) 0.12 % mouthwash 10 mL  10 mL Oral BID  morphine 10 mg/ml injection 4 mg  4 mg IntraVENous Q1H PRN Or  
 morphine 10 mg/ml injection 5 mg  5 mg IntraVENous Q1H PRN  
 dextrose 5 % - 0.45% NaCl infusion  25 mL/hr IntraVENous CONTINUOUS  
 vasopressin (VASOSTRICT) 20 Units in 0.9% sodium chloride 100 mL infusion  0-0.1 Units/min IntraVENous TITRATE  PHENYLephrine (PF)(LEWIS-SYNEPHRINE) 30 mg in 0.9% sodium chloride 250 mL infusion   mcg/min IntraVENous TITRATE  sodium bicarbonate (8.4%) injection 50 mEq  50 mEq IntraVENous PRN  
 famotidine (PF) (PEPCID) 20 mg in 0.9% sodium chloride 10 mL injection  20 mg IntraVENous DAILY Data Review:  
Recent Results (from the past 24 hour(s)) GLUCOSE, POC Collection Time: 01/18/20 12:11 PM  
Result Value Ref Range Glucose (POC) 222 (H) 65 - 100 mg/dL POTASSIUM Collection Time: 01/18/20  3:10 PM  
Result Value Ref Range Potassium 3.8 3.5 - 5.1 mmol/L PROTHROMBIN TIME + INR Collection Time: 01/18/20  3:10 PM  
Result Value Ref Range Prothrombin time 31.4 (H) 11.7 - 14.5 sec INR 2.9 FIBRINOGEN Collection Time: 01/18/20  3:10 PM  
Result Value Ref Range Fibrinogen 215 190 - 501 mg/dL D DIMER Collection Time: 01/18/20  3:10 PM  
Result Value Ref Range D DIMER >20.00 (HH) <0.56 ug/ml(FEU) MAGNESIUM Collection Time: 01/18/20  3:10 PM  
Result Value Ref Range Magnesium 1.8 1.8 - 2.4 mg/dL GLUCOSE, POC Collection Time: 01/18/20  3:16 PM  
Result Value Ref Range Glucose (POC) 95 65 - 100 mg/dL HGB & HCT Collection Time: 01/18/20  4:01 PM  
Result Value Ref Range HGB 7.6 (L) 13.6 - 17.2 g/dL HCT 24.6 (L) 41.1 - 50.3 % TYPE + CROSSMATCH Collection Time: 01/18/20  4:43 PM  
Result Value Ref Range Crossmatch Expiration 01/21/2020 ABO/Rh(D) AB POSITIVE Antibody screen NEG Unit number [id number] Blood component type  LR Unit division 00 Status of unit TRANSFUSED Crossmatch result Compatible GLUCOSE, POC Collection Time: 01/18/20  7:25 PM  
Result Value Ref Range Glucose (POC) 90 65 - 100 mg/dL POTASSIUM Collection Time: 01/18/20  9:31 PM  
Result Value Ref Range Potassium 4.0 3.5 - 5.1 mmol/L  
GLUCOSE, POC Collection Time: 01/18/20  9:31 PM  
Result Value Ref Range Glucose (POC) 87 65 - 100 mg/dL GLUCOSE, POC Collection Time: 01/18/20 11:16 PM  
Result Value Ref Range Glucose (POC) 78 65 - 100 mg/dL GLUCOSE, POC Collection Time: 01/19/20 12:11 AM  
Result Value Ref Range Glucose (POC) 90 65 - 100 mg/dL GLUCOSE, POC Collection Time: 01/19/20  1:44 AM  
Result Value Ref Range Glucose (POC) 133 (H) 65 - 100 mg/dL POC G3 Collection Time: 01/19/20  2:58 AM  
Result Value Ref Range Device: VENT    
 FIO2 (POC) 60 % pH (POC) 7.402 7.35 - 7.45    
 pCO2 (POC) 47.5 (H) 35 - 45 MMHG  
 pO2 (POC) 144 (H) 75 - 100 MMHG  
 HCO3 (POC) 29.5 (H) 22 - 26 MMOL/L  
 sO2 (POC) 99 (H) 95 - 98 % Base excess (POC) 4 mmol/L Mode SIMV Tidal volume 450 ml Set Rate 22 bpm  
 PEEP/CPAP (POC) 14 cmH2O Pressure support 14 cmH2O Allens test (POC) NOT APPLICABLE Inspiratory Time 0.9 sec Site DRAWN FROM ARTERIAL LINE Specimen type (POC) ARTERIAL Performed by [employer]   
 CO2, POC 31 MMOL/L Respiratory comment: NurseNotified Exhaled minute volume 11.70 L/min COLLECT TIME 255 GLUCOSE, POC Collection Time: 01/19/20  3:03 AM  
Result Value Ref Range Glucose (POC) 172 (H) 65 - 100 mg/dL METABOLIC PANEL, BASIC Collection Time: 01/19/20  3:08 AM  
Result Value Ref Range Sodium 140 136 - 145 mmol/L Potassium 3.9 3.5 - 5.1 mmol/L Chloride 104 98 - 107 mmol/L  
 CO2 29 21 - 32 mmol/L Anion gap 7 7 - 16 mmol/L Glucose 174 (H) 65 - 100 mg/dL BUN 13 8 - 23 MG/DL Creatinine 1.35 0.8 - 1.5 MG/DL  
 GFR est AA >60 >60 ml/min/1.73m2 GFR est non-AA 55 (L) >60 ml/min/1.73m2 Calcium 7.7 (L) 8.3 - 10.4 MG/DL  
PTT Collection Time: 01/19/20  3:08 AM  
Result Value Ref Range aPTT 68.7 (H) 24.7 - 39.8 SEC  
CBC W/O DIFF Collection Time: 01/19/20  3:08 AM  
Result Value Ref Range WBC 10.6 4.3 - 11.1 K/uL  
 RBC 2.73 (L) 4.23 - 5.6 M/uL HGB 8.6 (L) 13.6 - 17.2 g/dL HCT 26.8 (L) 41.1 - 50.3 % MCV 98.2 (H) 79.6 - 97.8 FL  
 MCH 31.5 26.1 - 32.9 PG  
 MCHC 32.1 31.4 - 35.0 g/dL  
 RDW 14.4 11.9 - 14.6 % PLATELET 19 (LL) 634 - 450 K/uL MPV Unable to calculate. Recommend adding IPF. 9.4 - 12.3 FL ABSOLUTE NRBC 0.03 0.0 - 0.2 K/uL MAGNESIUM Collection Time: 01/19/20  3:08 AM  
Result Value Ref Range Magnesium 1.7 (L) 1.8 - 2.4 mg/dL GLUCOSE, POC Collection Time: 01/19/20  4:22 AM  
Result Value Ref Range Glucose (POC) 167 (H) 65 - 100 mg/dL GLUCOSE, POC Collection Time: 01/19/20  6:02 AM  
Result Value Ref Range Glucose (POC) 160 (H) 65 - 100 mg/dL EKG:  (EKG has been independently visualized by me with interpretation below) Assessment:  
 
Principal Problem: 
  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  
1) CAD - post CABG on asa/statin 2) Shock - remains on pressors 3) s/p AVR 4) DEJUAN - continued on CRRT 5) Hypoxia/vent dep - ween per pulm/ICU tea 6) Afib - recurrent AF overnight, stop IV amio, start 400mg Q12H  
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 
Cardiology/Electrophysiology

## 2020-01-19 NOTE — PROGRESS NOTES
Ventilator check complete; patient has a #8. 0 ET tube secured at the 25 at the lip. Patient is sedated. Patient isn't able to follow commands. Breath sounds are diminished. Trachea is midline, Negative for subcutaneous air, and chest excursion is symmetric. Patient is also Negative for cyanosis and is Negative for pitting edema. All alarms are set and audible. Resuscitation bag is at the head of the bed. Ventilator Settings Mode FIO2 Rate Tidal Volume Pressure PEEP I:E Ratio PRVC  60 %    450 ml  14 cm H2O  14 cm H20  1:2   
 
Peak airway pressure: 30.4 cm H2O Minute ventilation: 10.9 l/min ABG: No results for input(s): PH, PCO2, PO2, HCO3 in the last 72 hours.  
 
 
[address], RT

## 2020-01-19 NOTE — PROGRESS NOTES
Ventilator check complete; patient has a #8. 0 ET tube secured at the 25 at the lip. Patient is not sedated. Patient is not able to follow commands. Breath sounds are clear and diminished. Trachea is midline, Negative for subcutaneous air, and chest excursion is symmetric. Patient is also Negative for cyanosis and is Positive for pitting edema. All alarms are set and audible. Resuscitation bag is at the head of the bed. Ventilator Settings Mode FIO2 Rate Tidal Volume Pressure PEEP I:E Ratio PRVC  60 %    450 ml  10 cm H2O  14 cm H20  1:2   
 
Peak airway pressure: 31 cm H2O Minute ventilation: 10.8 l/min ABG: No results for input(s): PH, PCO2, PO2, HCO3 in the last 72 hours.  
 
 
Liberty Soto, RT

## 2020-01-19 NOTE — PROGRESS NOTES
Pt in Afib, rate 100-110. BP remains stable. Orders received from Duc Ware NP for [address] Cardiology to restart Amio gtt at 1 mcg/min and follow protocol.

## 2020-01-19 NOTE — PROGRESS NOTES
Bedside verbal report given to Carlyle Richard RN. Current drips verified  And hemodynamics reviewed.

## 2020-01-19 NOTE — PROGRESS NOTES
[address] Hematology Oncology Inpatient Hematology / Oncology Progress Note Admission Date: 1/10/2020  4:59 AM 
Reason for Admission/Hospital Course: Atherosclerosis of native coronary artery of native heart with unstable angina pectoris ([address] [I25.110] Nonrheumatic aortic valve stenosis [I35.0] CAD (coronary artery disease) [I25.10] Aortic valve stenosis [I35.0] 24 Hour Events: 
Remains intubated Critical condition Continues to require pressor support HIT positive, awaiting confirmation per SHARON  
 
 
ROS: unable to determine due to intubation Allergies Allergen Reactions  Heparin Other (comments) HIT  Amoxicillin Rash  Keflex [Cephalexin] Rash  Pcn [Penicillins] Other (comments) \"felt closed in\". No rash OBJECTIVE: 
Patient Vitals for the past 8 hrs: 
 BP Pulse Resp SpO2  
01/19/20 1128  64 25 100 % 01/19/20 1127  64 25 100 % 01/19/20 1126  64 25 100 % 01/19/20 1115  66 25 100 % 01/19/20 1100 96/49 66 25 99 % 01/19/20 1045  69 25 99 % 01/19/20 1029 100/56 66 25 100 % 01/19/20 1015  68 25 100 % 01/19/20 1000 101/57 69 25 100 % 01/19/20 0945  71 25 100 % 01/19/20 0929 100/57 71 25 100 % 01/19/20 0915  70 25 100 % 01/19/20 0900 103/53 72 25 100 % 01/19/20 0845  73 25 100 % 01/19/20 0837  75 25 100 % 01/19/20 0830  74 25 100 % 01/19/20 0829 99/55 74 25 100 % 01/19/20 0815  75 25 100 % 01/19/20 0814  75 25 100 % 01/19/20 0800 109/52 75 25 100 % 01/19/20 0745  75 25 100 % 01/19/20 0730  76 25 100 % 01/19/20 0715  76 25 100 % 01/19/20 0700 105/56 79 25 100 % 01/19/20 0645  79 25 100 % 01/19/20 0630  79 25 100 % 01/19/20 0629 96/52 79 25 100 % 01/19/20 0620  79 25 100 % 01/19/20 0618  80 25 100 % 01/19/20 0617  81 (!) 35 100 % 01/19/20 0615  81 26 100 % 01/19/20 0601 136/57 81 25 100 % 01/19/20 0600  97 25 100 % 01/19/20 0550  (!) 109 25 100 % 20 0531 95/60 (!) 109 25 100 % 20 0500 124/56 (!) 104 25 100 % 20 0445  (!) 112 25 99 % 20 0430  (!) 110 25 100 % 20 0429 133/63 (!) 108 25 100 % 20 0415  (!) 109 24 99 % 20 0414 128/55 (!) 116 29 91 % Temp (24hrs), Av.4 °F (36.9 °C), Min:97.8 °F (36.6 °C), Max:99.3 °F (37.4 °C) 
 
 07 -  190 In: 596.4 [I.V.:506.4] Out: 468 [Urine:6] Physical Exam: 
Constitutional: Well developed, well nourished, acutely ill-appearing male in no acute distress, lying comfortably in the hospital bed. HEENT: Normocephalic and atraumatic. Sclerae anicteric. Neck supple. Skin Warm and dry. Minimal ecchymosis on bilateral upper and lower extremities. + pallor. Respiratory Lungs decreased with crackles in bases. Normal air exchange without accessory muscle use. CVS Normal rate, regular rhythm and normal S1 and S2. No murmurs, gallops, or rubs. Abdomen Soft, nontender and nondistended, normoactive bowel sounds. Neuro Sedated/ventilated. MSK Generalized anasarca. Toes/fingers mottled bilaterally. Psych Sedated/ventilated. Labs: 
   
Recent Labs  
  20 
0308 20 
1601 20 
0426 20 
1351 20 
9945 WBC 10.6  --  9.9  --  9.3  
RBC 2.73*  --  2.41*  --  2.59* HGB 8.6* 7.6* 7.5*  --  8.1* HCT 26.8* 24.6* 23.8*  --  26.0*  
MCV 98.2*  --  98.8*  --  100.4* MCH 31.5  --  31.1  --  31.3 MCHC 32.1  --  31.5  --  31.2*  
RDW 14.4  --  14.5  --  14.6 PLT 19*  --  33* 15* 19* GRANS  --   --  75  --  82* LYMPH  --   --  8*  --  7* MONOS  --   --  8  --  5  
EOS  --   --  9*  --  6  
BASOS  --   --  0  --  0 IG  --   --  1  --  1 DF  --   --  AUTOMATED  --  AUTOMATED ANEU  --   --  7.2  --  7.6 ABL  --   --  0.7  --  0.7 ABM  --   --  0.8  --  0.4 SHU  --   --  0.8  --  0.5 ABB  --   --  0.0  --  0.0 AIG  --   --  0.1  --  0.1 Recent Labs  
  20 
0308 20 2131 01/18/20 
1510  01/18/20 
0426  01/17/20 
1351  01/17/20 
0400   --   --   --  141  --   --   --  141  
K 3.9 4.0 3.8   < > 3.8   < > 4.6   < > 3.6   --   --   --  107  --   --   --  106 CO2 29  --   --   --  27  --   --   --  27 AGAP 7  --   --   --  7  --   --   --  8  
*  --   --   --  139*  --   --   --  133* BUN 13  --   --   --  40*  --   --   --  35* CREA 1.35  --   --   --  3.21*  --   --   --  3.18* GFRAA >60  --   --   --  25*  --   --   --  25* GFRNA 55*  --   --   --  20*  --   --   --  20* CA 7.7*  --   --   --  7.8*  --   --   --  7.8* SGOT  --   --   --   --   --   --  55*  --   --   
AP  --   --   --   --   --   --  102  --   --   
TP  --   --   --   --   --   --  5.1*  --   --   
ALB  --   --   --   --  1.9*  --  1.7*  --  1.5*  
GLOB  --   --   --   --   --   --  3.4  --   --   
AGRAT  --   --   --   --   --   --  0.5*  --   --   
MG 1.7*  --  1.8  --  2.1  --   --   --   --   
PHOS  --   --   --   --  3.2  --   --   --  3.3  
 < > = values in this interval not displayed. Imaging: XR CHEST SNGL V [[id number]] Collected: 01/19/20 0528 Order Status: Completed Updated: 01/19/20 4383 Narrative:    
EXAM: XR CHEST SNGL V 
 
INDICATION: Coronary artery disease COMPARISON: 1/18/2020 FINDINGS: A portable AP radiograph of the chest was obtained at 0334 hours. The 
patient is on a cardiac monitor. Bibasilar airspace disease. Worse in the 
interval. The cardiac and mediastinal contours and pulmonary vascularity are 
normal.  The bones and soft tissues are grossly within normal limits. Impression:    
IMPRESSION: Findings most consistent with pulmonary edema worse in the interval.  
XR CHEST PORT [[id number]] Collected: 01/18/20 0502 Order Status: Completed Updated: 01/18/20 7884 Narrative:    
EXAM: XR CHEST PORT INDICATION: Coronary artery disease COMPARISON: 1/17/2020 FINDINGS: A portable AP radiograph of the chest was obtained at 0500 hours. The 
patient is on a cardiac monitor. Bibasilar airspace disease improved. The 
cardiac and mediastinal contours and pulmonary vascularity are normal.  The 
bones and soft tissues are grossly within normal limits. Impression:    
IMPRESSION: Bibasilar atelectasis improved. XR CHEST SNGL V [[id number]] Collected: 01/17/20 1009 Order Status: Completed Updated: 01/17/20 1024 Narrative:    
CHEST X-RAY, one view. HISTORY:  Pneumothorax status post open heart surgery, follow-up. TECHNIQUE:  AP upright portable view COMPARISON: Yesterday's exam 
 
FINDINGS:    
Small right apical pneumothorax is even smaller. Increased atelectasis or 
infiltrate at both bases. ET tube dialysis type catheter introducer sheath and 
sternal wires remain. Impression:    
IMPRESSION:  Decreased pneumothorax. Increased atelectasis or infiltrate at both 
bases. XR CHEST SNGL V [[id number]] Order Status: Canceled XR CHEST SNGL V [[id number]] Collected: 01/16/20 0427 Order Status: Completed Updated: 01/16/20 4451 Narrative:    
Clinical history: Post chest tube removal. 
 
TECHNIQUE: AP portable chest 
 
COMPARISON: Yesterday. FINDINGS: 
 
Previous left-sided chest tube is no longer seen. Endotracheal and enteric tubes 
and left-sided subclavian catheter are stable. Right [place]-Nalle[place] catheter has been 
removed. Right IJ sheath is still present. Postsurgical changes of sternotomy. There is vascular congestion. Bibasilar opacities, likely atelectasis. There is 
suggestion of small right apical pneumothorax. There is tiny left apical 
pneumothorax. Impression:    
IMPRESSION: 
 
1. Previous left chest tube is no longer seen. Tiny right apical pneumothorax. 2. Suggestion of small right apical pneumothorax. 3. Bibasilar atelectasis and vascular congestion, similar to prior. XR CHEST SNGL V [[id number]] Collected: 01/15/20 1641 Order Status: Completed Updated: 01/15/20 1656 Narrative:    
 Portable view of the chest  
 
COMPARISON: Yesterday. CLINICAL HISTORY: Postop. FINDINGS: 
 
Stable postsurgical changes of sternotomy. Endotracheal tube, right [place]-Nallely 
catheter and left chest tube are stable. Enteric tube is not well-seen. Bibasilar opacities, likely atelectasis. There is vascular congestion. No 
pneumothorax. Cardiomediastinal contour and the surrounding bones are stable. Impression:    
IMPRESSION: 
 
1. Stable post op findings. No significant change in the appearance of the 
lungs. No pneumothorax. XR CHEST SNGL V [[id number]] Collected: 01/14/20 0610 Order Status: Completed Updated: 01/14/20 0619 Narrative:    
 Portable view of the chest  
 
COMPARISON: Yesterday. CLINICAL HISTORY: Postop. FINDINGS: 
 
Stable postsurgical changes of sternotomy. Endotracheal tube, enteric tube, 
right-sided [place]alle[place] catheter and left chest tube are stable. There is stable 
left subclavian catheter. Cardiac mediastinal contour and the surrounding bones 
are stable. No pneumothorax. There is vascular congestion. Bibasilar opacities, 
likely atelectasis. Impression:    
IMPRESSION: 
 
1. Stable post op findings. ET tube tip is in good position. 2. Vascular congestion and bibasilar atelectasis, similar to prior exam.  
XR CHEST SNGL V [[id number]] Collected: 01/13/20 0650 Order Status: Completed Updated: 01/13/20 2237 Narrative:    
 Portable view of the chest  
 
COMPARISON: Yesterday CLINICAL HISTORY: Postop, follow-up. FINDINGS: 
 
Endotracheal tube, enteric tube, right-sided [place]-Nallely catheter, and left chest 
tube are stable. Cardiac silhouette is enlarged, similar to prior exam. 
Bibasilar opacities, likely atelectasis. There is vascular congestion. No 
pneumothorax. Left-sided subclavian catheter is stable. Impression:    
IMPRESSION: 
 
1. Stable post op findings. ET tube tip is in good position. 2. Bibasilar atelectasis and vascular congestion. No pneumothorax. 3. No significant change compared to prior exam.  
XR CHEST SNGL V [[id number]] Collected: 01/12/20 5245 Order Status: Completed Updated: 01/12/20 2541 Narrative:    
 Portable view of the chest  
 
COMPARISON: January 12, 2020. CLINICAL HISTORY: Subclavian line placement FINDINGS: 
 
There is a new left subclavian catheter with the tip in the area of SVC/right 
atrium junction. Right [place]-Nalle[place] catheter, enteric tube and endotracheal tube 
and left-sided chest tube are stable. There is vascular congestion. Bibasilar 
opacities, likely atelectasis. No significant pneumothorax. Cardiomediastinal 
contour and the surrounding bones are stable. Stable postsurgical changes of 
sternotomy. Impression:    
IMPRESSION: 
 
1. New left subclavian catheter. No significant pneumothorax. 2. Vascular congestion and bibasilar atelectasis. XR CHEST SNGL V [[id number]] Collected: 01/12/20 0301 Order Status: Completed Updated: 01/12/20 1769 Narrative:    
 Portable view of the chest  
 
Clinical history: Worsening hypercapnia, on vent COMPARISON: January 11, 2020. FINDINGS: 
 
Stable postsurgical changes of sternotomy. Endotracheal tube, enteric tube, 
right-sided [place]alle[place] catheter and left-sided chest tube are stable. There is 
small right apical pneumothorax. There is vascular congestion. Bibasilar 
opacities, likely atelectasis. Cardiomediastinal contour and the surrounding 
bones are stable. Impression:    
IMPRESSION: 
 
1. Small right apical pneumothorax, appearing slightly decreased since the prior 
exam. 
 
2. Vascular congestion and bibasilar atelectasis. XR CHEST SNGL V [[id number]] Order Status: Canceled XR CHEST SNGL V [[id number]] Collected: 01/11/20 1220 Order Status: Completed Updated: 01/11/20 1231 Narrative:    
PORTABLE CHEST, January 11, 2020 at 1200 hours CLINICAL HISTORY:  Follow-up right pneumothorax. COMPARISON:  0359 hours today. FINDINGS:  AP erect image demonstrates persistent small right apical 
pneumothorax.  The heart remains enlarged with pulmonary venous congestion and 
bibasilar atelectasis status post CABG.  Endotracheal, feeding, and left chest 
tubes as well as [place]-Nallely catheter remain in place. Impression:    
IMPRESSION:  NO SIGNIFICANT INTERVAL CHANGE, INCLUDING THE SMALL RIGHT APICAL PNEUMOTHORAX. [address] [place] [address] [[id number]] Collected: 01/11/20 1122 Order Status: Completed Updated: 01/11/20 1127 Narrative:    
ULTRASOUND OF THE KIDNEYS AND BLADDER 
 
CLINICAL HISTORY:  Acute on chronic kidney disease. COMPARISON:  None. FINDINGS: The examination was limited by the patient's morbid obesity as well as 
limited mobility and ability to suspend respirations.  Multiple images from real 
time ultrasound evaluation of the kidneys show that they are normal in size and 
orientation bilaterally.  The right kidney measures 12.3 cm in length while the 
left measures 12.4 cm.  No definite solid renal mass, hydronephrosis, stone, or 
abnormal perinephric collection is seen. The bladder was not distended at the 
time of examination. Impression:    
IMPRESSION:  Unremarkable, technically limited examination. XR CHEST SNGL V [[id number]] Collected: 01/11/20 0636 Order Status: Completed Updated: 01/11/20 7615 Narrative:    
 Portable view of the chest  
 
COMPARISON: January 10, 2020 CLINICAL HISTORY: Postop. FINDINGS: 
 
Stable postsurgical changes of sternotomy. Endotracheal tube, enteric tube and 
right-sided [place]-Nalle[place] catheter and left-sided chest tube are stable. There is 
vascular congestion. Bibasilar opacities, likely atelectasis. There is small 
right apical pneumothorax, similar to prior exam. Cardiac mediastinal contour 
and the surrounding bones are stable.   
Impression:    
IMPRESSION: 
 
 1. Stable small right apical pneumothorax. 2. Vascular congestion and bibasilar atelectasis. No change in the appearance of 
the lungs. XR CHEST SNGL V [[id number]] Collected: 01/10/20 1617 Order Status: Completed Updated: 01/10/20 1623 Narrative:    
Chest, one view, 1/10/2020 Indication:Postop cardiovascular surgery Comparison:1/8/2020 There are post median sternotomy changes. Support apparatus including [place]-Nallely 
catheter tip projects over the proximal right pulmonary artery. Endotracheal 
tube about 6 cm above the radha. There is an NG tube tip of which is not 
included on this exam. There is also left-sided chest tube. No pneumothorax. Patient appears to have had an aortic valve f replacement. There is perihilar 
atelectasis and likely a small left effusion. Impression:    
Impression: Recent median sternotomy changes with support apparatus as detailed 
above. Perihilar and likely a small left effusion. ASSESSMENT: 
 
Problem List  Date Reviewed: 12/6/2019 Codes Class Noted Bilateral pneumothorax ICD-10-CM: J93.9 ICD-9-CM: 512.89  1/17/2020 Thrombocytopenia ([facility] ICD-10-CM: D69.6 ICD-9-CM: 287.5  1/17/2020 Shock ([facility] ICD-10-CM: R57.9 ICD-9-CM: 785.50  1/15/2020 Atrial fibrillation [facility]) ICD-10-CM: I48.91 
ICD-9-CM: 427.31  1/13/2020 Acute renal failure (ARF) (HCC) ICD-10-CM: N17.9 ICD-9-CM: 584.9  1/11/2020 Aortic valve stenosis ICD-10-CM: I35.0 ICD-9-CM: 424.1  1/10/2020 * (Principal) CAD (coronary artery disease) ICD-10-CM: I25.10 ICD-9-CM: 414.00  1/10/2020 Weaning from respirator [facility]) ICD-10-CM: Z99.11 ICD-9-CM: V46.13  1/10/2020 Acute hypoxemic respiratory failure ([address] ICD-10-CM: J96.01 
ICD-9-CM: 518.81  1/10/2020 S/P CABG x 3 ICD-10-CM: Z95.1 ICD-9-CM: V45.81  1/10/2020 S/P AVR ICD-10-CM: Z95.2 ICD-9-CM: V43.3  1/10/2020  Bilateral carotid artery stenosis ICD-10-CM: I65.23 
 ICD-9-CM: 433.10, 433.30  11/24/2019 Overview Signed 11/24/2019  8:06 PM by Scooby Salazar MD  
  1. Carotid Duplex (11/20/19): Bilateral 50-69% ICA stenosis. Type 2 diabetes with nephropathy ([facility] ICD-10-CM: E11.21 
ICD-9-CM: 250.40, 583.81  8/26/2019 Obesity, morbid ([facility] ICD-10-CM: E66.01 
ICD-9-CM: 278.01  10/19/2018 Nonrheumatic aortic valve stenosis ICD-10-CM: I35.0 ICD-9-CM: 424.1  4/13/2018 Overview Addendum 9/18/2019  6:16 PM by Scooby Salazar MD  
  1. Echo (10/15/18): EF 55-60%. Moderate to severe Aortic Stenosis. MG 31.  PG 52.  DI 0.25. 
2.  Echo (9/11/19):  EF 60-65%. Mild LAE. SEvere AS. MG 40. PG 61. DI=0.23. Mild to moderate MRAlec DM type 2 (diabetes mellitus, type 2) (HCC) ICD-10-CM: E11.9 ICD-9-CM: 250.00  1/14/2013 Gout ICD-10-CM: M10.9 ICD-9-CM: 274.9  1/14/2013 HTN (hypertension) ICD-10-CM: I10 
ICD-9-CM: 401.9  1/14/2013 BPH (benign prostatic hyperplasia) ICD-10-CM: N40.0 ICD-9-CM: 600.00  1/14/2013 Seasonal allergic rhinitis ICD-10-CM: J30.2 ICD-9-CM: 477.9  1/14/2013 HLD (hyperlipidemia) ICD-10-CM: E78.5 ICD-9-CM: 272.4  1/14/2013 Mr. Antonio Keyes is a 69 y.o. male admitted on 1/10/2020. His PMH includes DM, thyroid disease, prior h/o CAD with recent [address] 12/31/19 showing severe multivessel coronary artery disease in the setting of known severe aortic stenosis. Patient was seen by CTS and set up for surgery on 1/10/2020. Patient underwent CABG x3 (LIMA>LAD, SVG>OM, SVG>PDA) and aortic valve replacement with a 25-mm Intuity Jon-Gutierres valve. He became hypoxic, hypotensive and hard to ventilate. Pulmonary seeing patient and he remains intubated and sedated. He is also on pressors. Nephrology saw patient for worsening renal failure and CRRT was started on 1/12/2020. The patient went into AFib on 1/12 and was started on IV amiodarone.  He was started on Azactam/Vanc for sepsis. CXR with increased atelectasis or infiltrate at both bases. BCx-NGTD. UCx-NG. Sputum Cx with light normal resp yessenia. On admission, WBC 16.9, Hgb 9.0, Plt 98k. On 1/17, WBC 9.3/ANC 7.6, Hgb 8.1, Plt 15k. Today platelets up to 97,047 without intervention. HIT/SHARON pending. Primary team changed heparin to argatroban. We were consulted for thrombocytopenia. 
  
PLAN: 
Thrombocytopenia - Plt 98k on admission, down to 15k 
- marrow suppression from sepsis/shock likely contributing 
- HIT pending - Check DIC labs, hemolysis labs, and peripheral smear - Primary team has changed heparin to Argatroban - Transfuse plt prn 
01/18 Platelets have rebounded on their own, without transfusion to 33k today. DIC labs not impressive - repeat today for completeness. Smear and haptoglobin pending, follow. HIT/SHARON pending. As soon as HIT returns as negative, may stop argatroban as platelets are so low. His worsening thrombocytopenia is likely multifactorial related to recent clinical course, sepsis with transient marrow suppression and increased consumption, along with exposure to various medications including amiodarone.  Expect his platelets to improve over time as his clinical condition improves.  In interim he should be transfused to keep his platelets over 63,402. ZDKK improving.  
01/19 HIT positive, await SHARON for confirmation. Platelets down to 82,801 today. Will obtain upper and lower extremity dopplers to rule out DVT's. Continue argatroban.  
 
  
Anemia - Transfuse prn to keep Hgb >8 
- DEJUAN on CRRT / marrow suppression from sepsis/shock - Tsat 17%, ferritin 557. Check folate, B12, hemolysis labs 01/19 Continue to follow haptoglobin and smear.  
  
DEJUAN 
- Neph following, on CRRT 
  
Sepsis/Shock 
- on pressor support - On Azactam/Vanc - Cultures NGTD 
  
Hypoxia  
- intubated on vent - Pulm following 
  
Afib - Cards managing 
  
CAD 
- s/p CABG 
  
 Lab studies and imaging studies were personally reviewed. Thank you for allowing us to participate in the care of Mr. Jaylin Montgomery. Arnaldo Davison  [address] Hematology Oncology [address] [place] [address] Office : [phone] Fax : [phone] Attending Addendum: 
I have personally performed a face to face diagnostic evaluation on this patient. I have reviewed and agree with the care plan as documented above Renu Velasco N.P.  My findings are as follows: Patient appears lethargic, heart rate regular without murmurs, abdomen is non-tender, bowel sounds are positive. 69 male history of CAD, more recently underwent CABG and AVR 1/10/2020, with subsequent course complicated with ongoing need for ventilatory support, as well as hyper tension necessitating pressors. He is also had worsening renal function needing CRRT since 1/12/2020. Other history significant for A. fib necessitating IV amiodarone use, recently stopped. He also remains on broad-spectrum antibiotics for sepsis. Hemaotology consulted for worsening thrombocytopenia since 1/14/2020, nadiring to 15,000 yesterday 1/17/2020. Patient has since been started on argatroban drip with concerns for HIT, with testing including PF4 antibody screen as well as SHARON sent out. Significant thrombocytopenia persists. No overt bleeding noticed, and hemoglobin has remained stable. HIT PF4 ab screen +ve (at 3.13), continue argatroban. Check surveillance Dopplers of his limbs. Consider platelet transfusion for severe thrombocytopenia  
  
     
 
  
Lali Matamoros MD 
[facility] Hematology/Oncology [address] [place] [address] Office : [phone] Fax : [phone]

## 2020-01-19 NOTE — PROGRESS NOTES
Cardiovascular ICU Progress Note: 1/19/2020 Jenise Villalobos Admission Date: 1/10/2020 Patient had CABG x 3 and AVR . Slow to improve. Had small ptx that self resolved. 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 The patient's chart is reviewed and the patient is discussed with the staff. Subjective:  
 
Fluid balance 1L net negative after 4.2kg removed with dialysis yesterday. BUN improved Platelets down to 36O. 
amio stopped yesterday, restarted overnight Remains hypotensive- Now on low dose levophed and vasopressin at 0.1 Oddly pulmonary edema looks slightly worse on CXR, though was able to tolerate PSV for several hours last night and I/O were negative. Awakened more last night and required restraints but followed commands Review of Systems Review of systems not obtained due to patient factors. -- sedated and unresponsive. Allergies Allergen Reactions  Heparin Other (comments) HIT  Amoxicillin Rash  Keflex [Cephalexin] Rash  Pcn [Penicillins] Other (comments) \"felt closed in\". No rash Current Facility-Administered Medications Medication Dose Route Frequency  amiodarone (CORDARONE) 450 mg in dextrose 5% 250 mL infusion  0.5-1 mg/min IntraVENous TITRATE  
 0.9% sodium chloride infusion 250 mL  250 mL IntraVENous PRN  
 albumin human 25% (BUMINATE) solution 12.5 g  12.5 g IntraVENous Q6H  
 epoetin maritza-epbx (RETACRIT) 14,000 Units combo injection  14,000 Units SubCUTAneous Q7D  
 argatroban 50 mg in 0.9% sodium chloride 50 mL (1000 mcg/mL) infusion  0.5-10 mcg/kg/min IntraVENous TITRATE  alcohol 62% (NOZIN) nasal  1 Ampule  1 Ampule Topical Q12H  
 NUTRITIONAL SUPPORT ELECTROLYTE PRN ORDERS   Does Not Apply PRN  
 NOREPINephrine (LEVOPHED) 16,000 mcg in dextrose 5% 250 mL infusion 0. 05-0.2 mcg/kg/min IntraVENous TITRATE  
 0.9% sodium chloride infusion 250 mL  250 mL IntraVENous PRN  
 albuterol (PROVENTIL VENTOLIN) nebulizer solution 2.5 mg  2.5 mg Nebulization QID PRN  
 fentaNYL in normal saline (pf) 25 mcg/mL infusion  0-200 mcg/hr IntraVENous TITRATE  midazolam (VERSED) 100 mg in 0.9% sodium chloride 100 mL infusion  0-10 mg/hr IntraVENous TITRATE  acetaminophen (TYLENOL) solution 650 mg  650 mg Per NG tube Q4H PRN  
 0.9% sodium chloride infusion  25 mL/hr IntraVENous CONTINUOUS  
 sodium chloride (NS) flush 5-40 mL  5-40 mL IntraVENous Q8H  
 sodium chloride (NS) flush 5-40 mL  5-40 mL IntraVENous PRN  
 oxyCODONE-acetaminophen (PERCOCET) 5-325 mg per tablet 1 Tab  1 Tab Oral Q4H PRN  
 morphine 10 mg/ml injection 3 mg  3 mg IntraVENous Q1H PRN  
 naloxone (NARCAN) injection 0.4 mg  0.4 mg IntraVENous PRN  
 EPINEPHrine (ADRENALIN) 4 mg in 0.9% sodium chloride 250 mL infusion  0.04-0.1 mcg/kg/min IntraVENous TITRATE  amiodarone (CORDARONE) tablet 200 mg  200 mg Oral Q12H  
 [Held by provider] metoprolol tartrate (LOPRESSOR) tablet 25 mg  25 mg Oral Q12H  
 atorvastatin (LIPITOR) tablet 80 mg  80 mg Oral QHS  insulin regular (NOVOLIN R, HUMULIN R) 100 Units in 0.9% sodium chloride 100 mL infusion  1 Units/hr IntraVENous TITRATE  dextrose (D50W) injection syrg 12.5 g  25 mL IntraVENous PRN  
 [Held by provider] aspirin chewable tablet 81 mg  81 mg Oral DAILY  magnesium sulfate 1 g/100 ml IVPB (premix or compounded)  1 g IntraVENous PRN  
 midazolam (VERSED) injection 1 mg  1 mg IntraVENous Q1H PRN  chlorhexidine (PERIDEX) 0.12 % mouthwash 10 mL  10 mL Oral BID  morphine 10 mg/ml injection 4 mg  4 mg IntraVENous Q1H PRN  Or  
 morphine 10 mg/ml injection 5 mg  5 mg IntraVENous Q1H PRN  
 dextrose 5 % - 0.45% NaCl infusion  25 mL/hr IntraVENous CONTINUOUS  
 vasopressin (VASOSTRICT) 20 Units in 0.9% sodium chloride 100 mL infusion  0-0.1 Units/min IntraVENous TITRATE  PHENYLephrine (PF)(LEWIS-SYNEPHRINE) 30 mg in 0.9% sodium chloride 250 mL infusion   mcg/min IntraVENous TITRATE  sodium bicarbonate (8.4%) injection 50 mEq  50 mEq IntraVENous PRN  
 famotidine (PF) (PEPCID) 20 mg in 0.9% sodium chloride 10 mL injection  20 mg IntraVENous DAILY Objective:  
 
Vitals:  
 01/19/20 9053 01/19/20 0615 01/19/20 4288 01/19/20 6037 BP: 136/57   96/52 Pulse: 81 81 79 79 Resp: 25 26 25 25 Temp:      
SpO2: 100% 100% 100% 100% Weight:      
Height:      
 
Blood pressure 96/52, pulse 79, temperature 98.2 °F (36.8 °C), resp. rate 25, height 5' 10\" (1.778 m), weight 333 lb 12.4 oz (151.4 kg), SpO2 100 %. Intake and Output:  
01/17 0701 - 01/18 1900 In: 2544 [I.V.:4758] Out: 7519 [Urine:234] 01/18 1901 - 01/19 0700 In: 1527.6 [I.V.:891.6] Out: 0896 [Urine:55] Physical Exam:         
Constitutional:  Sedated, orally intubated and mechanically ventilated. EENMT:  Sclera clear, pupils equal, oral mucosa moist and orally intubated Respiratory: clearer today with b/l breath sounds. Cardiovascular:  RRR with no M,G,R; 
Gastrointestinal:  soft; no bowel sounds present Musculoskeletal:  warm with no cyanosis, +2  lower extremity edema. [place] site  leg with ace wrap. SKIN:  no jaundice or ecchymosis Neurologic:   no gross neuro deficits Psychiatric:  Sedated and comfortable. CXR:   1/19: atelectasis and pulmonary vascular congestion noted LINES:  ETT, brown, swan sherri, arterial line, chest tubes times 2 in epigastric area without air leak. DRIPS: 
Levo gtt Argatroban Insulin Ventilator Settings Mode FIO2 Rate Tidal Volume Pressure PEEP PRVC  60 %    450 ml  14 cm H2O  14 cm H20 Peak airway pressure: 26 cm H2O Minute ventilation: 13.2 l/min ABG:  
Recent Labs  
  01/19/20 
0258 01/18/20 
0315 01/17/20 
0250 PHI 7.402 7.395 7.498* PCO2I 47.5* 44.1 35.5 PO2I 144* 221* 98 HCO3I 29.5* 27.0* 27.5* LAB Recent Labs  
  01/19/20 0308 01/18/20 
1601 01/18/20 
1510 01/18/20 
0426 01/17/20 
1351 01/17/20 
0834 01/17/20 
0400 WBC 10.6  --   --  9.9  --  9.3 10.6 HGB 8.6* 7.6*  --  7.5*  --  8.1* 7.8* HCT 26.8* 24.6*  --  23.8*  --  26.0* 24.4*  
PLT 19*  --   --  33* 15* 19* 37* INR  --   --  2.9  --  1.6  --   --   
 
Recent Labs  
  01/19/20 0308 01/18/20 
2131 01/18/20 1510 01/18/20 
0426 01/17/20 
1351 01/17/20 
0400   --   --   --  141  --   --   --  141  
K 3.9 4.0 3.8   < > 3.8   < > 4.6   < > 3.6   --   --   --  107  --   --   --  106 CO2 29  --   --   --  27  --   --   --  27 *  --   --   --  139*  --   --   --  133* BUN 13  --   --   --  40*  --   --   --  35* CREA 1.35  --   --   --  3.21*  --   --   --  3.18* MG 1.7*  --  1.8  --  2.1  --   --   --   --   
PHOS  --   --   --   --  3.2  --   --   --  3.3 CA 7.7*  --   --   --  7.8*  --   --   --  7.8* ALB  --   --   --   --  1.9*  --  1.7*  --  1.5* SGOT  --   --   --   --   --   --  55*  --   --   
 < > = values in this interval not displayed. Assessment and Plan :  (Medical Decision Making) Hospital Problems  Date Reviewed: 12/6/2019 Codes Class Noted POA Shock ([address] still on pressors  ICD-10-CM: R57.9 ICD-9-CM: 785.50  1/15/2020 Unknown Atrial fibrillation [facility]) ICD-10-CM: I48.91 
ICD-9-CM: 427.31  1/13/2020 Unknown On amiodarone Acute renal failure (ARF) ([facility]) ICD-10-CM: N17.9 ICD-9-CM: 584.9  1/11/2020 Unknown Not tolerating dialysis well Aortic valve stenosis ICD-10-CM: I35.0 ICD-9-CM: 424.1  1/10/2020 Unknown * (Principal) CAD (coronary artery disease) ICD-10-CM: I25.10 ICD-9-CM: 414.00  1/10/2020 Unknown Weaning from respirator [facility]) still on vent  ICD-10-CM: Z99.11 ICD-9-CM: V46.13  1/10/2020 Unknown  Acute hypoxemic respiratory failure (HCC) ICD-10-CM: J96.01 
 ICD-9-CM: 518.81  1/10/2020 S/P CABG x 3 ICD-10-CM: Z95.1 ICD-9-CM: V45.81  1/10/2020 Unknown S/P AVR ICD-10-CM: Z95.2 ICD-9-CM: V43.3  1/10/2020 Unknown Thrombocytopenia 
--d-dimer >20, fibrinogen 170, INR 1.6 
 
69  y old morbidly obese s/p AVR and CABG x3. Remains hypotensive with pulmonary edema Plan:  
 
--Continue efforts at volume removal today to address pulmonary status and facilitate extubation. --hold albumin, check CVP. Limit volume in today if possible. --continue pressors for MAP goal 65. --precedex added for agitation control and can wean to PSV today. Consider extubation if more hemodynamically stable later today. --completed antibiotics --continue argatroban  
--continue aggressive supportive care per CV surgery More than 50% of the time documented was spent in face-to-face contact with the patient and in the care of the patient on the floor/unit where the patient is located.  
 
Sandy Fleming MD

## 2020-01-20 NOTE — DIALYSIS
24 HR SLED initiated using  Left CVC. Aspirated and flushed both catheter ports without problem. Machine settings per MD order. 200  DFR  200 UFR with 4 k Citrasate. Heparin 0 unit bolus and 0 ml/hr. Reported to primary nurse. Dialysis nurse available as needed.

## 2020-01-20 NOTE — PROGRESS NOTES
[facility] CARDIOLOGY PROGRESS NOTE 
      
 
1/20/2020 9:34 AM 
 
Admit Date: 1/10/2020 Subjective:  
Unable to obtain as patient intubated/sedated. Per nursing staff tryign to wean levophed. ROS: 
Unable to obtain as patient intubated/sedated. Objective:  
  
Vitals:  
 01/20/20 2716 01/20/20 3111 01/20/20 6530 01/20/20 1544 BP:  161/74  134/57 Pulse: 63 64 62 65 Resp: 13 25 26 Temp:      
SpO2: 99% 98% 98% Weight:      
Height:      
 
Physical Exam: 
General-  patient intubated/sedated Neck- supple, no JVD 
CV- regular rate and rhythm no MRG, extremities warm and perfused Lung- mechanical breath sounds bilaterally, some wheezes Ext- + edema bilaterally legs/arms Skin- warm and dry Data Review:  
Recent Labs  
  01/20/20 
0314 01/19/20 
0308  01/18/20 
1510  01/17/20 
1351  140  --   --    < >  --   
K 3.2* 3.9   < > 3.8   < > 4.6 MG 1.8 1.7*  --  1.8   < >  --   
BUN 13 13  --   --    < >  --   
CREA 1.55* 1.35  --   --    < >  --   
* 174*  --   --    < >  -- WBC 11.2* 10.6  --   --    < >  --   
HGB 8.8* 8.6*   < >  --    < >  --   
HCT 27.2* 26.8*   < >  --    < >  --   
PLT 28* 19*  --   --    < > 15* INR  --   --   --  2.9  --  1.6  
 < > = values in this interval not displayed. Assessment/Plan:  
 
Principal Problem: 
  CAD (coronary artery disease) (1/10/2020) 
  - s/p CABG with AVR 1/10/2020 - ASA, atorvastatin 
  - hemodynamics improving, but still requiring Levophed infusion, attempting wean 
  - volume removal today per renal  
 
Active Problems: Aortic valve stenosis (1/10/2020) - post AVR, functioning appropriately on TERRI Weaning from respirator [facility]) (1/10/2020) Per primary Atrial fibrillation ([address] (1/13/2020) - now in sinus  
  - amiodarone Shock ([address] (1/15/2020) - distributive, on levophed HIT 
  - SHARON pending, on Argatroban Acute renal failure - per nephrology, CRRT/HD Migdalia Hebert DO 
1/20/2020 9:34 AM

## 2020-01-20 NOTE — PROGRESS NOTES
Renal Progress Note Admission Date: 1/10/2020 Subjective: Intubated and sedated. Objective:  
 
Physical Exam:   
Patient Vitals for the past 8 hrs: 
 BP Temp Pulse Resp SpO2 Weight  
01/20/20 0655   62 25 98 %   
01/20/20 0650   62 25 98 %   
01/20/20 0645   63 25 99 %   
01/20/20 0640   63 25 99 %   
01/20/20 0635   63 25 100 %   
01/20/20 0630   (!) 59 25 99 %   
01/20/20 0625   (!) 57 25 100 %   
01/20/20 0620   (!) 58 25 100 %   
01/20/20 0615   (!) 59 25 99 %   
01/20/20 0610   60 25 99 %   
01/20/20 0605   61 25 99 %   
01/20/20 0600   62 25 98 %   
01/20/20 0555   63 25 99 %   
01/20/20 0550   65 25 99 %   
01/20/20 0547      150.1 kg (330 lb 14.6 oz) 01/20/20 0545   62 25 99 %   
01/20/20 0540 96/55  62 24 98 %   
01/20/20 0535   61 12 98 %   
01/20/20 0530   63 25 97 %   
01/20/20 0525   63 25 99 %   
01/20/20 0520   63 25 99 %   
01/20/20 0515   63 25 99 %   
01/20/20 0510   64  99 %   
01/20/20 0505   64  99 %   
01/20/20 0500   62 25 99 %   
01/20/20 0455   61 25 99 %   
01/20/20 0450   61 25 100 %   
01/20/20 0445   60 25 100 %   
01/20/20 0440   60 24 99 %   
01/20/20 0435   60 25 99 %   
01/20/20 0430 125/60  60 25 99 %   
01/20/20 0425   61 25 99 %   
01/20/20 0420   62 25 100 %   
01/20/20 0415   63 25 100 %   
01/20/20 0410   65 25 100 %   
01/20/20 0406   65 25 100 %   
01/20/20 0400   71 25 100 %   
01/20/20 0355   63 25 100 %   
01/20/20 0350   63 25 100 %   
01/20/20 0345   63 25 100 %   
01/20/20 0340   62 25 100 %   
01/20/20 0335   61 25 100 %   
01/20/20 0330 116/56  62 25 100 %   
01/20/20 0325   63 25 100 %   
01/20/20 0320   63 25 100 %   
01/20/20 0315 174/77  65 25 100 %   
01/20/20 0300 149/68 98 °F (36.7 °C) 65 25 100 %   
01/20/20 0245   74 25 100 %   
01/20/20 0230   62 25 100 %   
01/20/20 0215   62 27 100 %   
 01/20/20 0200 130/63  63 25 100 %   
01/20/20 0145   63 25 100 %   
01/20/20 0140   63 25 100 %   
01/20/20 0135   63 25 100 %   
01/20/20 0130   62 24 100 %   
01/20/20 0125   63 25 100 %   
01/20/20 0120   63 25 100 %   
01/20/20 0115   65 25 100 %   
01/20/20 0110   66 25 100 %   
01/20/20 0105   63 25 100 %   
01/20/20 0100 127/59  64 25 100 %   
01/20/20 0055   63 25 100 %   
01/20/20 0050   69 25 100 %   
01/20/20 0045   63 25 100 %   
01/20/20 0040   64 25 99 %   
01/20/20 0035   63 25 100 %   
01/20/20 0030   63 25 100 %   
01/20/20 0025   63 25 100 %   
01/20/20 0015   66 25 99 %  Gen: comfortable , NAD HEENT: moist membranes CV: S1, S2 
Lungs: Clear bilaterally Extem: no edema Current Facility-Administered Medications Medication Dose Route Frequency  amiodarone (CORDARONE) 450 mg in dextrose 5% 250 mL infusion  0.5-1 mg/min IntraVENous TITRATE  dexmedeTOMidine (PRECEDEX) 400 mcg in 0.9% sodium chloride 100 mL infusion  0.2-0.7 mcg/kg/hr IntraVENous TITRATE  amiodarone (CORDARONE) tablet 400 mg  400 mg Per NG tube Q12H  
 fat emulsion 20% (LIPOSYN, INTRAlipid) infusion 250 mL  250 mL IntraVENous QPM  
 TPN ADULT - dextrose 10% amino acid 4.25%   IntraVENous QPM  
 0.9% sodium chloride infusion 250 mL  250 mL IntraVENous PRN  
 epoetin maritza-epbx (RETACRIT) 14,000 Units combo injection  14,000 Units SubCUTAneous Q7D  
 argatroban 50 mg in 0.9% sodium chloride 50 mL (1000 mcg/mL) infusion  0.5-10 mcg/kg/min IntraVENous TITRATE  alcohol 62% (NOZIN) nasal  1 Ampule  1 Ampule Topical Q12H  
 NUTRITIONAL SUPPORT ELECTROLYTE PRN ORDERS   Does Not Apply PRN  
 NOREPINephrine (LEVOPHED) 16,000 mcg in dextrose 5% 250 mL infusion  0.05-0.2 mcg/kg/min IntraVENous TITRATE  
 0.9% sodium chloride infusion 250 mL  250 mL IntraVENous PRN  
 albuterol (PROVENTIL VENTOLIN) nebulizer solution 2.5 mg  2.5 mg Nebulization QID PRN  
  fentaNYL in normal saline (pf) 25 mcg/mL infusion  0-200 mcg/hr IntraVENous TITRATE  midazolam (VERSED) 100 mg in 0.9% sodium chloride 100 mL infusion  0-10 mg/hr IntraVENous TITRATE  acetaminophen (TYLENOL) solution 650 mg  650 mg Per NG tube Q4H PRN  
 0.9% sodium chloride infusion  25 mL/hr IntraVENous CONTINUOUS  
 sodium chloride (NS) flush 5-40 mL  5-40 mL IntraVENous Q8H  
 sodium chloride (NS) flush 5-40 mL  5-40 mL IntraVENous PRN  
 oxyCODONE-acetaminophen (PERCOCET) 5-325 mg per tablet 1 Tab  1 Tab Oral Q4H PRN  
 morphine 10 mg/ml injection 3 mg  3 mg IntraVENous Q1H PRN  
 naloxone (NARCAN) injection 0.4 mg  0.4 mg IntraVENous PRN  
 EPINEPHrine (ADRENALIN) 4 mg in 0.9% sodium chloride 250 mL infusion  0.04-0.1 mcg/kg/min IntraVENous TITRATE  [Held by provider] metoprolol tartrate (LOPRESSOR) tablet 25 mg  25 mg Oral Q12H  
 atorvastatin (LIPITOR) tablet 80 mg  80 mg Oral QHS  insulin regular (NOVOLIN R, HUMULIN R) 100 Units in 0.9% sodium chloride 100 mL infusion  1 Units/hr IntraVENous TITRATE  dextrose (D50W) injection syrg 12.5 g  25 mL IntraVENous PRN  
 [Held by provider] aspirin chewable tablet 81 mg  81 mg Oral DAILY  magnesium sulfate 1 g/100 ml IVPB (premix or compounded)  1 g IntraVENous PRN  
 midazolam (VERSED) injection 1 mg  1 mg IntraVENous Q1H PRN  chlorhexidine (PERIDEX) 0.12 % mouthwash 10 mL  10 mL Oral BID  morphine 10 mg/ml injection 4 mg  4 mg IntraVENous Q1H PRN Or  
 morphine 10 mg/ml injection 5 mg  5 mg IntraVENous Q1H PRN  
 dextrose 5 % - 0.45% NaCl infusion  25 mL/hr IntraVENous CONTINUOUS  
 vasopressin (VASOSTRICT) 20 Units in 0.9% sodium chloride 100 mL infusion  0-0.1 Units/min IntraVENous TITRATE  PHENYLephrine (PF)(LEWIS-SYNEPHRINE) 30 mg in 0.9% sodium chloride 250 mL infusion   mcg/min IntraVENous TITRATE  sodium bicarbonate (8.4%) injection 50 mEq  50 mEq IntraVENous PRN Data Review:  
 
LABS:  
 Recent Results (from the past 12 hour(s)) GLUCOSE, POC Collection Time: 01/19/20  8:14 PM  
Result Value Ref Range Glucose (POC) 154 (H) 65 - 100 mg/dL GLUCOSE, POC Collection Time: 01/19/20  9:23 PM  
Result Value Ref Range Glucose (POC) 161 (H) 65 - 100 mg/dL GLUCOSE, POC Collection Time: 01/19/20 10:16 PM  
Result Value Ref Range Glucose (POC) 155 (H) 65 - 100 mg/dL GLUCOSE, POC Collection Time: 01/19/20 11:12 PM  
Result Value Ref Range Glucose (POC) 156 (H) 65 - 100 mg/dL GLUCOSE, POC Collection Time: 01/20/20 12:06 AM  
Result Value Ref Range Glucose (POC) 152 (H) 65 - 100 mg/dL GLUCOSE, POC Collection Time: 01/20/20  1:02 AM  
Result Value Ref Range Glucose (POC) 145 (H) 65 - 100 mg/dL GLUCOSE, POC Collection Time: 01/20/20  2:05 AM  
Result Value Ref Range Glucose (POC) 139 (H) 65 - 100 mg/dL PHOSPHORUS Collection Time: 01/20/20  3:14 AM  
Result Value Ref Range Phosphorus 2.4 2.3 - 3.7 MG/DL MAGNESIUM Collection Time: 01/20/20  3:14 AM  
Result Value Ref Range Magnesium 1.8 1.8 - 2.4 mg/dL METABOLIC PANEL, BASIC Collection Time: 01/20/20  3:14 AM  
Result Value Ref Range Sodium 141 136 - 145 mmol/L Potassium 3.2 (L) 3.5 - 5.1 mmol/L Chloride 104 98 - 107 mmol/L  
 CO2 30 21 - 32 mmol/L Anion gap 7 7 - 16 mmol/L Glucose 126 (H) 65 - 100 mg/dL BUN 13 8 - 23 MG/DL Creatinine 1.55 (H) 0.8 - 1.5 MG/DL  
 GFR est AA 57 (L) >60 ml/min/1.73m2 GFR est non-AA 47 (L) >60 ml/min/1.73m2 Calcium 7.6 (L) 8.3 - 10.4 MG/DL  
PTT Collection Time: 01/20/20  3:14 AM  
Result Value Ref Range aPTT 67.2 (H) 24.7 - 39.8 SEC TRIGLYCERIDE Collection Time: 01/20/20  3:14 AM  
Result Value Ref Range Triglyceride 167 (H) 35 - 150 MG/DL  
CBC W/O DIFF Collection Time: 01/20/20  3:14 AM  
Result Value Ref Range WBC 11.2 (H) 4.3 - 11.1 K/uL  
 RBC 2.79 (L) 4.23 - 5.6 M/uL HGB 8.8 (L) 13.6 - 17.2 g/dL HCT 27.2 (L) 41.1 - 50.3 % MCV 97.5 79.6 - 97.8 FL  
 MCH 31.5 26.1 - 32.9 PG  
 MCHC 32.4 31.4 - 35.0 g/dL  
 RDW 14.2 11.9 - 14.6 % PLATELET 28 (LL) 531 - 450 K/uL MPV 13.5 (H) 9.4 - 12.3 FL ABSOLUTE NRBC 0.00 0.0 - 0.2 K/uL GLUCOSE, POC Collection Time: 01/20/20  3:20 AM  
Result Value Ref Range Glucose (POC) 129 (H) 65 - 100 mg/dL GLUCOSE, POC Collection Time: 01/20/20  4:00 AM  
Result Value Ref Range Glucose (POC) 127 (H) 65 - 100 mg/dL POC G3 Collection Time: 01/20/20  4:04 AM  
Result Value Ref Range Device: VENT    
 FIO2 (POC) 50 % pH (POC) 7.429 7.35 - 7.45    
 pCO2 (POC) 46.3 (H) 35 - 45 MMHG  
 pO2 (POC) 110 (H) 75 - 100 MMHG  
 HCO3 (POC) 30.7 (H) 22 - 26 MMOL/L  
 sO2 (POC) 98 95 - 98 % Base excess (POC) 6 mmol/L Mode Pressure regulated volume control Tidal volume 450 ml Set Rate 25 bpm  
 PEEP/CPAP (POC) 14 cmH2O Allens test (POC) NOT APPLICABLE Inspiratory Time 0.79 sec Site DRAWN FROM ARTERIAL LINE Specimen type (POC) ARTERIAL Performed by Feliberto   
 CO2, POC 32 MMOL/L Respiratory comment: NurseNotified Exhaled minute volume 11.00 L/min COLLECT TIME 400 GLUCOSE, POC Collection Time: 01/20/20  5:51 AM  
Result Value Ref Range Glucose (POC) 120 (H) 65 - 100 mg/dL GLUCOSE, POC Collection Time: 01/20/20  7:55 AM  
Result Value Ref Range Glucose (POC) 104 (H) 65 - 100 mg/dL Plan:  
 
Principal Problem: 
  CAD (coronary artery disease) (1/10/2020) Active Problems: Aortic valve stenosis (1/10/2020) Weaning from respirator [facility]) (1/10/2020) Acute hypoxemic respiratory failure ([address] (1/10/2020) S/P CABG x 3 (1/10/2020) S/P AVR (1/10/2020) Acute renal failure (ARF) ([address] (1/11/2020) Atrial fibrillation ([address] (1/13/2020) Shock ([address] (1/15/2020) Bilateral pneumothorax (1/17/2020) Thrombocytopenia ([address] (1/17/2020) A on CKD - Renal US unremarkable. 
  
ASHD/ AS - s/p CABG  Aortic Valve replacement 
  
DM 
  
HTN/ Volume - UF as tolerated 
  
Resp Failure - Hypoxic Resp failure from pulmonary edema on vent. 
  
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 Will continue today. Now on Argatroban so hopefully with less clotting issues. Addendum: 
The patient was seen on dialysis at 3:06 PM .  BP is stable. Catheter is functioning well. He is more awake.  Fluid removal going ok.

## 2020-01-20 NOTE — WOUND CARE
patient seen critically ill in ICU. Noted fissure in intragluteal cleft 3x0.3cm pink base, consistent with friciton/ shear. Dark discoloration of buttocks and posterior thighs as well as tips of toes, tips of finger this discoloration is consistent with a patient that is on vasopressors, also noted patient has HIT, HIT can contribute to this as well. All areas on buttocks/ sacrum or posterior thighs are not consistent with Pressure injury, no pressure injuries noted at this time. Continue turning and prevention, add rooke boots. Will monitor.

## 2020-01-20 NOTE — PROGRESS NOTES
A follow up visit was made to the patient. Emotional support, spiritual presence and  
prayer were provided for the patient. He was not alert. EDMOND Gay

## 2020-01-20 NOTE — PROGRESS NOTES
POD 10 Days Post-Op Procedure:  Procedure(s): CORONARY ARTERY BYPASS GRAFT WITH AVR/ (CABG X 3 )/ LIMA VEIN HARVEST/ GREATER SAPHENOUS 
ESOPHAGEAL TRANS ECHOCARDIOGRAM 
 
 
Subjective: On vent but follows commands Objective:  
 
Patient Vitals for the past 8 hrs: 
 BP Temp Pulse Resp SpO2 Weight  
01/20/20 0655   62 25 98 %   
01/20/20 0650   62 25 98 %   
01/20/20 0645   63 25 99 %   
01/20/20 0640   63 25 99 %   
01/20/20 0635   63 25 100 %   
01/20/20 0630   (!) 59 25 99 %   
01/20/20 0625   (!) 57 25 100 %   
01/20/20 0620   (!) 58 25 100 %   
01/20/20 0615   (!) 59 25 99 %   
01/20/20 0610   60 25 99 %   
01/20/20 0605   61 25 99 %   
01/20/20 0600   62 25 98 %   
01/20/20 0555   63 25 99 %   
01/20/20 0550   65 25 99 %   
01/20/20 0547      330 lb 14.6 oz (150.1 kg) 01/20/20 0545   62 25 99 %   
01/20/20 0540 96/55  62 24 98 %   
01/20/20 0535   61 12 98 %   
01/20/20 0530   63 25 97 %   
01/20/20 0525   63 25 99 %   
01/20/20 0520   63 25 99 %   
01/20/20 0515   63 25 99 %   
01/20/20 0510   64  99 %   
01/20/20 0505   64  99 %   
01/20/20 0500   62 25 99 %   
01/20/20 0455   61 25 99 %   
01/20/20 0450   61 25 100 %   
01/20/20 0445   60 25 100 %   
01/20/20 0440   60 24 99 %   
01/20/20 0435   60 25 99 %   
01/20/20 0430 125/60  60 25 99 %   
01/20/20 0425   61 25 99 %   
01/20/20 0420   62 25 100 %   
01/20/20 0415   63 25 100 %   
01/20/20 0410   65 25 100 %   
01/20/20 0406   65 25 100 %   
01/20/20 0400   71 25 100 %   
01/20/20 0355   63 25 100 %   
01/20/20 0350   63 25 100 %   
01/20/20 0345   63 25 100 %   
01/20/20 0340   62 25 100 %   
01/20/20 0335   61 25 100 %   
01/20/20 0330 116/56  62 25 100 %   
01/20/20 0325   63 25 100 %   
01/20/20 0320   63 25 100 %   
01/20/20 0315 174/77  65 25 100 %   
 20 0300 149/68 98 °F (36.7 °C) 65 25 100 %   
/20/20 0245   74 25 100 %   
/20/20 0230   62 25 100 %   
//20 0215   62 27 100 %   
/20/20 0200 130/63  63 25 100 %   
/20/20 0145   63 25 100 %   
/20/20 0140   63 25 100 %   
/20/20 0135   63 25 100 %   
/20/20 0130   62 24 100 %   
/20/20 0125   63 25 100 %   
/20/20 0120   63 25 100 %   
/20/20 0115   65 25 100 %   
/20/20 0110   66 25 100 %   
/20/20 0105   63 25 100 %   
/20/20 0100 127/59  64 25 100 %   
/20/20 0055   63 25 100 %   
/20/20 0050   69 25 100 %   
/20/20 0045   63 25 100 %   
/20 0040   64 25 99 %   
/20/20 0035   63 25 100 %   
/20/20 0030   63 25 100 %   
/20/20 0025   63 25 100 %   
20/20 0015   66 25 99 %  Temp (24hrs), Av.2 °F (36.8 °C), Min:97.8 °F (36.6 °C), Max:98.7 °F (37.1 °C) Hemodynamics PAP Systolic: 50 PAP 
CO (l/min): 7.5 l/min CO 
CI (l/min/m2): 2.9 l/min/m2 CI No intake/output data recorded.  1901 -  0700 In: 4098.2 [I.V.:3222.2] Out: 2693 [Urine:87] CT Drainage 
  
  
   
  total of all CT's Heart:  regular rate and rhythm, S1, S2 normal, no murmur, click, rub or gallop Lung:  clear to auscultation bilaterally Neuro: Grossly non focal 
Incisions: Clean, dry, and intact Labs: 
Recent Results (from the past 24 hour(s)) GLUCOSE, POC Collection Time: 20  8:30 AM  
Result Value Ref Range Glucose (POC) 180 (H) 65 - 100 mg/dL EKG, 12 LEAD, SUBSEQUENT Collection Time: 20  9:53 AM  
Result Value Ref Range Ventricular Rate 70 BPM  
 Atrial Rate 70 BPM  
 P-R Interval 198 ms QRS Duration 172 ms Q-T Interval 516 ms  
 QTC Calculation (Bezet) 557 ms Calculated P Axis -2 degrees Calculated R Axis -7 degrees Calculated T Axis 56 degrees Diagnosis Normal sinus rhythm Left bundle branch block Abnormal ECG 
 When compared with ECG of 13-JAN-2020 08:28, Sinus rhythm has replaced Atrial fibrillation Left bundle branch block has replaced Non-specific intra-ventricular  
conduction block Confirmed by Loretta Leahy MD (), Court [place] ([id number]) on 1/19/2020 11:07:57 AM 
  
GLUCOSE, POC Collection Time: 01/19/20 11:32 AM  
Result Value Ref Range Glucose (POC) 170 (H) 65 - 100 mg/dL GLUCOSE, POC Collection Time: 01/19/20  3:22 PM  
Result Value Ref Range Glucose (POC) 124 (H) 65 - 100 mg/dL GLUCOSE, POC Collection Time: 01/19/20  7:31 PM  
Result Value Ref Range Glucose (POC) 152 (H) 65 - 100 mg/dL GLUCOSE, POC Collection Time: 01/19/20  8:14 PM  
Result Value Ref Range Glucose (POC) 154 (H) 65 - 100 mg/dL GLUCOSE, POC Collection Time: 01/19/20  9:23 PM  
Result Value Ref Range Glucose (POC) 161 (H) 65 - 100 mg/dL GLUCOSE, POC Collection Time: 01/19/20 10:16 PM  
Result Value Ref Range Glucose (POC) 155 (H) 65 - 100 mg/dL GLUCOSE, POC Collection Time: 01/19/20 11:12 PM  
Result Value Ref Range Glucose (POC) 156 (H) 65 - 100 mg/dL GLUCOSE, POC Collection Time: 01/20/20 12:06 AM  
Result Value Ref Range Glucose (POC) 152 (H) 65 - 100 mg/dL GLUCOSE, POC Collection Time: 01/20/20  1:02 AM  
Result Value Ref Range Glucose (POC) 145 (H) 65 - 100 mg/dL GLUCOSE, POC Collection Time: 01/20/20  2:05 AM  
Result Value Ref Range Glucose (POC) 139 (H) 65 - 100 mg/dL PHOSPHORUS Collection Time: 01/20/20  3:14 AM  
Result Value Ref Range Phosphorus 2.4 2.3 - 3.7 MG/DL MAGNESIUM Collection Time: 01/20/20  3:14 AM  
Result Value Ref Range Magnesium 1.8 1.8 - 2.4 mg/dL METABOLIC PANEL, BASIC Collection Time: 01/20/20  3:14 AM  
Result Value Ref Range Sodium 141 136 - 145 mmol/L Potassium 3.2 (L) 3.5 - 5.1 mmol/L Chloride 104 98 - 107 mmol/L  
 CO2 30 21 - 32 mmol/L Anion gap 7 7 - 16 mmol/L Glucose 126 (H) 65 - 100 mg/dL BUN 13 8 - 23 MG/DL Creatinine 1.55 (H) 0.8 - 1.5 MG/DL  
 GFR est AA 57 (L) >60 ml/min/1.73m2 GFR est non-AA 47 (L) >60 ml/min/1.73m2 Calcium 7.6 (L) 8.3 - 10.4 MG/DL  
PTT Collection Time: 01/20/20  3:14 AM  
Result Value Ref Range aPTT 67.2 (H) 24.7 - 39.8 SEC TRIGLYCERIDE Collection Time: 01/20/20  3:14 AM  
Result Value Ref Range Triglyceride 167 (H) 35 - 150 MG/DL  
CBC W/O DIFF Collection Time: 01/20/20  3:14 AM  
Result Value Ref Range WBC 11.2 (H) 4.3 - 11.1 K/uL  
 RBC 2.79 (L) 4.23 - 5.6 M/uL HGB 8.8 (L) 13.6 - 17.2 g/dL HCT 27.2 (L) 41.1 - 50.3 % MCV 97.5 79.6 - 97.8 FL  
 MCH 31.5 26.1 - 32.9 PG  
 MCHC 32.4 31.4 - 35.0 g/dL  
 RDW 14.2 11.9 - 14.6 % PLATELET 28 (LL) 782 - 450 K/uL MPV 13.5 (H) 9.4 - 12.3 FL ABSOLUTE NRBC 0.00 0.0 - 0.2 K/uL GLUCOSE, POC Collection Time: 01/20/20  3:20 AM  
Result Value Ref Range Glucose (POC) 129 (H) 65 - 100 mg/dL GLUCOSE, POC Collection Time: 01/20/20  4:00 AM  
Result Value Ref Range Glucose (POC) 127 (H) 65 - 100 mg/dL POC G3 Collection Time: 01/20/20  4:04 AM  
Result Value Ref Range Device: VENT    
 FIO2 (POC) 50 % pH (POC) 7.429 7.35 - 7.45    
 pCO2 (POC) 46.3 (H) 35 - 45 MMHG  
 pO2 (POC) 110 (H) 75 - 100 MMHG  
 HCO3 (POC) 30.7 (H) 22 - 26 MMOL/L  
 sO2 (POC) 98 95 - 98 % Base excess (POC) 6 mmol/L Mode Pressure regulated volume control Tidal volume 450 ml Set Rate 25 bpm  
 PEEP/CPAP (POC) 14 cmH2O Allens test (POC) NOT APPLICABLE Inspiratory Time 0.79 sec Site DRAWN FROM ARTERIAL LINE Specimen type (POC) ARTERIAL Performed by Feliberto   
 CO2, POC 32 MMOL/L Respiratory comment: NurseNotified Exhaled minute volume 11.00 L/min COLLECT TIME 400 GLUCOSE, POC Collection Time: 01/20/20  5:51 AM  
Result Value Ref Range Glucose (POC) 120 (H) 65 - 100 mg/dL Assessment:  
 
Principal Problem: CAD (coronary artery disease) (1/10/2020) Active Problems: Aortic valve stenosis (1/10/2020) Weaning from respirator [facility]) (1/10/2020) Acute hypoxemic respiratory failure ([address] (1/10/2020) S/P CABG x 3 (1/10/2020) S/P AVR (1/10/2020) Acute renal failure (ARF) ([address] (1/11/2020) Atrial fibrillation ([address] (1/13/2020) Shock ([address] (1/15/2020) Bilateral pneumothorax (1/17/2020) Thrombocytopenia ([address] (1/17/2020) Plan/Recommendations/Medical Decision Making: HIT positive, wean off Levo, supportive care See orders

## 2020-01-20 NOTE — PROGRESS NOTES
Cardiovascular ICU Progress Note: 1/20/2020 More Garcia Admission Date: 1/10/2020 Patient had CABG x 3 and AVR . Slow to improve. Had small ptx that self resolved. 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 The patient's chart is reviewed and the patient is discussed with the staff. Subjective:  
Net fluid balance 1.5L negative yesterday Platelets 62W Slight leukocytosis of 11.2 Left popliteal thrombus on ultrasound. Right upper extremity thrombus present as well. HIT Ab + yesterday. Remains on levophed but is off vasopressin. BPs have widely varied. Review of Systems Review of systems not obtained due to patient factors. -- sedated and unresponsive. Allergies Allergen Reactions  Heparin Other (comments) HIT  Amoxicillin Rash  Keflex [Cephalexin] Rash  Pcn [Penicillins] Other (comments) \"felt closed in\". No rash Current Facility-Administered Medications Medication Dose Route Frequency  amiodarone (CORDARONE) 450 mg in dextrose 5% 250 mL infusion  0.5-1 mg/min IntraVENous TITRATE  dexmedeTOMidine (PRECEDEX) 400 mcg in 0.9% sodium chloride 100 mL infusion  0.2-0.7 mcg/kg/hr IntraVENous TITRATE  amiodarone (CORDARONE) tablet 400 mg  400 mg Per NG tube Q12H  
 fat emulsion 20% (LIPOSYN, INTRAlipid) infusion 250 mL  250 mL IntraVENous QPM  
 TPN ADULT - dextrose 10% amino acid 4.25%   IntraVENous QPM  
 0.9% sodium chloride infusion 250 mL  250 mL IntraVENous PRN  
 epoetin maritza-epbx (RETACRIT) 14,000 Units combo injection  14,000 Units SubCUTAneous Q7D  
 argatroban 50 mg in 0.9% sodium chloride 50 mL (1000 mcg/mL) infusion  0.5-10 mcg/kg/min IntraVENous TITRATE  alcohol 62% (NOZIN) nasal  1 Ampule  1 Ampule Topical Q12H  NUTRITIONAL SUPPORT ELECTROLYTE PRN ORDERS   Does Not Apply PRN  
 NOREPINephrine (LEVOPHED) 16,000 mcg in dextrose 5% 250 mL infusion  0.05-0.2 mcg/kg/min IntraVENous TITRATE  
 0.9% sodium chloride infusion 250 mL  250 mL IntraVENous PRN  
 albuterol (PROVENTIL VENTOLIN) nebulizer solution 2.5 mg  2.5 mg Nebulization QID PRN  
 fentaNYL in normal saline (pf) 25 mcg/mL infusion  0-200 mcg/hr IntraVENous TITRATE  midazolam (VERSED) 100 mg in 0.9% sodium chloride 100 mL infusion  0-10 mg/hr IntraVENous TITRATE  acetaminophen (TYLENOL) solution 650 mg  650 mg Per NG tube Q4H PRN  
 0.9% sodium chloride infusion  25 mL/hr IntraVENous CONTINUOUS  
 sodium chloride (NS) flush 5-40 mL  5-40 mL IntraVENous Q8H  
 sodium chloride (NS) flush 5-40 mL  5-40 mL IntraVENous PRN  
 oxyCODONE-acetaminophen (PERCOCET) 5-325 mg per tablet 1 Tab  1 Tab Oral Q4H PRN  
 morphine 10 mg/ml injection 3 mg  3 mg IntraVENous Q1H PRN  
 naloxone (NARCAN) injection 0.4 mg  0.4 mg IntraVENous PRN  
 EPINEPHrine (ADRENALIN) 4 mg in 0.9% sodium chloride 250 mL infusion  0.04-0.1 mcg/kg/min IntraVENous TITRATE  [Held by provider] metoprolol tartrate (LOPRESSOR) tablet 25 mg  25 mg Oral Q12H  
 atorvastatin (LIPITOR) tablet 80 mg  80 mg Oral QHS  insulin regular (NOVOLIN R, HUMULIN R) 100 Units in 0.9% sodium chloride 100 mL infusion  1 Units/hr IntraVENous TITRATE  dextrose (D50W) injection syrg 12.5 g  25 mL IntraVENous PRN  
 [Held by provider] aspirin chewable tablet 81 mg  81 mg Oral DAILY  magnesium sulfate 1 g/100 ml IVPB (premix or compounded)  1 g IntraVENous PRN  
 midazolam (VERSED) injection 1 mg  1 mg IntraVENous Q1H PRN  chlorhexidine (PERIDEX) 0.12 % mouthwash 10 mL  10 mL Oral BID  morphine 10 mg/ml injection 4 mg  4 mg IntraVENous Q1H PRN  Or  
 morphine 10 mg/ml injection 5 mg  5 mg IntraVENous Q1H PRN  
 dextrose 5 % - 0.45% NaCl infusion  25 mL/hr IntraVENous CONTINUOUS  
  vasopressin (VASOSTRICT) 20 Units in 0.9% sodium chloride 100 mL infusion  0-0.1 Units/min IntraVENous TITRATE  PHENYLephrine (PF)(LEWIS-SYNEPHRINE) 30 mg in 0.9% sodium chloride 250 mL infusion   mcg/min IntraVENous TITRATE  sodium bicarbonate (8.4%) injection 50 mEq  50 mEq IntraVENous PRN Objective:  
 
Vitals:  
 01/20/20 0600 01/20/20 8356 01/20/20 0610 01/20/20 4746 BP:      
Pulse: 62 61 60 (!) 59 Resp: 25 25 25 25 Temp:      
SpO2: 98% 99% 99% 99% Weight:      
Height:      
 
Blood pressure 96/55, pulse (!) 59, temperature 98 °F (36.7 °C), resp. rate 25, height 5' 10\" (1.778 m), weight 330 lb 14.6 oz (150.1 kg), SpO2 99 %. Intake and Output:  
01/18 0701 - 01/19 1900 In: 4469.7 [I.V.:3452.7] Out: 9033 [AAOBM:156] 01/19 1901 - 01/20 0700 In: 1456.9 [I.V.:1306.9] Out: 1726 Physical Exam:         
Constitutional:  Sedated, orally intubated and mechanically ventilated. EENMT:  Sclera clear, pupils equal, oral mucosa moist and orally intubated Respiratory: clearer today with b/l breath sounds. Cardiovascular:  RRR with no M,G,R; 
Gastrointestinal:  soft; no bowel sounds present Musculoskeletal:  warm with no cyanosis, +2  lower extremity edema. Marion site  leg with ace wrap. SKIN:  no jaundice or ecchymosis Neurologic:   no gross neuro deficits Psychiatric:  Sedated and comfortable. CXR:   
1/20: asymmetry noted. R>L pulmonary edema. This asymmetry raises question of [place] sign (PE on left) LINES:  ETT, brown, swan sherri, arterial line, chest tubes times 2 in epigastric area without air leak. DRIPS: 
Levo gtt Argatroban Ventilator Settings Mode FIO2 Rate Tidal Volume Pressure PEEP PRVC  50 %    450 ml  14 cm H2O  8 cm H20(reduced from 14 post ABG results) Peak airway pressure: 28.3 cm H2O Minute ventilation: 11.2 l/min ABG:  
Recent Labs  
  01/20/20 
0404 01/19/20 
0258 01/18/20 
0315 PHI 7.429 7.402 7.395  
 PCO2I 46.3* 47.5* 44.1 PO2I 110* 144* 221* HCO3I 30.7* 29.5* 27.0*  
 
LAB Recent Labs  
  01/20/20 0314 01/19/20 0308 01/18/20 
1601 01/18/20 
1510 01/18/20 0426 01/17/20 
1351 01/17/20 
0016 WBC 11.2* 10.6  --   --  9.9  --  9.3 HGB 8.8* 8.6* 7.6*  --  7.5*  --  8.1* HCT 27.2* 26.8* 24.6*  --  23.8*  --  26.0*  
PLT 28* 19*  --   --  33* 15* 19* INR  --   --   --  2.9  --  1.6  --   
 
Recent Labs  
  01/20/20 0314 01/19/20 0308 01/18/20 
2131 01/18/20 
1510  01/18/20 0426 01/17/20 
1351  140  --   --   --  141  --   --   
K 3.2* 3.9 4.0 3.8   < > 3.8   < > 4.6  104  --   --   --  107  --   --   
CO2 30 29  --   --   --  27  --   --   
* 174*  --   --   --  139*  --   --   
BUN 13 13  --   --   --  40*  --   --   
CREA 1.55* 1.35  --   --   --  3.21*  --   --   
MG 1.8 1.7*  --  1.8  --  2.1   < >  --   
PHOS 2.4  --   --   --   --  3.2  --   --   
CA 7.6* 7.7*  --   --   --  7.8*  --   --   
ALB  --   --   --   --   --  1.9*  --  1.7* SGOT  --   --   --   --   --   --   --  55*  
 < > = values in this interval not displayed. Assessment and Plan :  (Medical Decision Making) Hospital Problems  Date Reviewed: 12/6/2019 Codes Class Noted POA Shock ([facility] [address] still on pressors  ICD-10-CM: R57.9 ICD-9-CM: 785.50  1/15/2020 Unknown Atrial fibrillation [facility]) ICD-10-CM: I48.91 
ICD-9-CM: 427.31  1/13/2020 Unknown On po amiodarone Acute renal failure (ARF) (H[facility]) ICD-10-CM: N17.9 ICD-9-CM: 584.9  1/11/2020 Unknown Dialyzed yesterday Aortic valve stenosis ICD-10-CM: I35.0 ICD-9-CM: 424.1  1/10/2020 Unknown * (Principal) CAD (coronary artery disease) ICD-10-CM: I25.10 ICD-9-CM: 414.00  1/10/2020 Unknown Weaning from respirator [facility]) still on vent  ICD-10-CM: Z99.11 ICD-9-CM: V46.13  1/10/2020 Unknown Acute hypoxemic respiratory failure ([address] ICD-10-CM: J96.01 
ICD-9-CM: 518.81  1/10/2020 S/P CABG x 3 ICD-10-CM: Z95.1 ICD-9-CM: V45.81  1/10/2020 Unknown S/P AVR ICD-10-CM: Z95.2 ICD-9-CM: V43.3  1/10/2020 Unknown Thrombocytopenia 
--d-dimer >20, fibrinogen 170, INR 1.6 
 
69  y old morbidly obese s/p AVR and CABG x3. Remains hypotensive with pulmonary edema Plan:  
 
--Continue efforts at volume removal today to address pulmonary status and facilitate extubation. --10 days after surgery and little progress made weaning vent. Will likely need trach. --continue pressors for MAP goal 65. 
--PSV trial today. May need to wean precedex slightly to succeed. --continue argatroban for HIT with thrombosis in left leg and right arm. PE possible as well. --continue aggressive supportive care per CV surgery More than 50% of the time documented was spent in face-to-face contact with the patient and in the care of the patient on the floor/unit where the patient is located.  
 
Luis Alfredo Sutton MD

## 2020-01-20 NOTE — PROGRESS NOTES
[facility] Hematology Oncology Inpatient Hematology / Oncology Progress Note Admission Date: 1/10/2020  4:59 AM 
Reason for Admission/Hospital Course: Atherosclerosis of native coronary artery of native heart with unstable angina pectoris ([address] [I25.110] Nonrheumatic aortic valve stenosis [I35.0] CAD (coronary artery disease) [I25.10] Aortic valve stenosis [I35.0] 24 Hour Events: 
Remains intubated Critical condition Continues to require pressor support HIT positive, awaiting confirmation per SHARON  
DVT positive upper and lower extremities ROS: unable to determine due to intubation Allergies Allergen Reactions  Heparin Other (comments) HIT antibody test strongly positive on 1/17/2020. [address] drawn 1/18/2020 and still pending final results.  Amoxicillin Rash  Keflex [Cephalexin] Rash  Pcn [Penicillins] Other (comments) \"felt closed in\". No rash OBJECTIVE: 
Patient Vitals for the past 8 hrs: 
 BP Temp Pulse Resp SpO2 Weight  
01/20/20 1217   (!) 110 16 98 %   
01/20/20 1100 117/63  86 (!) 32 95 %   
01/20/20 1045   64 25 93 %   
01/20/20 1030   66 25 93 %   
01/20/20 1029 118/58  66 25 94 %   
01/20/20 1015   67 25 96 %   
01/20/20 1000 124/58  67 25 96 %   
01/20/20 0945 136/65  63 25 97 %   
01/20/20 0930   63 25 98 %   
01/20/20 0929 136/65  64 25 98 %   
01/20/20 0928 134/57  65     
01/20/20 0915   62 19 96 %   
01/20/20 0900 138/65  62 25 98 %   
01/20/20 0859   62 26 98 %   
01/20/20 0845   62 25 98 %   
01/20/20 0830   64 25 98 %   
01/20/20 0829 161/74  64 25 98 %   
01/20/20 0814   63 13 99 %   
01/20/20 0800 116/55  62 (!) 0 97 %   
01/20/20 0745   66 12 98 %   
01/20/20 0744 151/66  65 17 100 %   
01/20/20 0730 186/82 97.6 °F (36.4 °C) 67 25 100 %   
01/20/20 0715   (!) 57 25 100 %   
01/20/20 0701 117/54  60 25 98 %   
01/20/20 0700 115/56  60 25 98 %   
 // 0655   62 25 98 %   
/20 0650   62 25 98 %   
/ 0645   63 25 99 %   
// 0640   63 25 99 %   
/ 0636 176/77  63 25 100 %   
20 0635   63 25 100 %   
20 0630   (!) 59 25 99 %   
20 0629 97/54  (!) 58 25 100 %   
20 0625   (!) 57 25 100 %   
20 0620   (!) 58 25 100 %   
20 0615   (!) 59 25 99 %   
20 0614 96/55  (!) 59 25 99 %   
20 0610   60 25 99 %   
20 0605   61 25 99 %   
20 0600   62 25 98 %   
20 0559 131/60  62 25 98 %   
20 0555   63 25 99 %   
20 0550   65 25 99 %   
20 0547      330 lb 14.6 oz (150.1 kg) 20 0545   62 25 99 %   
/ 0543 101/53  62 25 98 %   
/ 0540 96/55  62 24 98 %   
20 0535   61 12 98 %   
20 0530   63 25 97 %   
20 0525   63 25 99 %   
/ 0520   63 25 99 %   
20 0515   63 25 99 %   
20 0510   64  99 %   
20 0505   64  99 %   
20 0500   62 25 99 %   
20 0459 165/80  62 25 99 %   
/ 0455   61 25 99 %   
20 0450   61 25 100 %   
20 0445   60 25 100 %   
20 0440   60 24 99 %  Temp (24hrs), Av °F (36.7 °C), Min:97.6 °F (36.4 °C), Max:98.7 °F (37.1 °C) 
 
 07 -  1900 In: 300 Out: - Physical Exam: 
Constitutional: Well developed, well nourished, acutely ill-appearing male in no acute distress, lying comfortably in the hospital bed. HEENT: Normocephalic and atraumatic. Sclerae anicteric. Neck supple. Skin Warm and dry. Minimal ecchymosis on bilateral upper and lower extremities. + pallor. Respiratory Deferred. CVS Deferred. Abdomen Deferred. Neuro Sedated/ventilated. MSK Generalized anasarca. Toes/fingers mottled bilaterally. Psych Sedated/ventilated. Labs: 
   
Recent Labs  
  20 
0314 20 
0308 20 417 11 148 01/18/20 
0426 WBC 11.2* 10.6  --  9.9  
RBC 2.79* 2.73*  --  2.41* HGB 8.8* 8.6* 7.6* 7.5* HCT 27.2* 26.8* 24.6* 23.8* MCV 97.5 98.2*  --  98.8*  
MCH 31.5 31.5  --  31.1 MCHC 32.4 32.1  --  31.5  
RDW 14.2 14.4  --  14.5 PLT 28* 19*  --  33* GRANS  --   --   --  75  
LYMPH  --   --   --  8* MONOS  --   --   --  8  
EOS  --   --   --  9* BASOS  --   --   --  0 IG  --   --   --  1 DF  --   --   --  AUTOMATED ANEU  --   --   --  7.2 ABL  --   --   --  0.7 ABM  --   --   --  0.8 SHU  --   --   --  0.8 ABB  --   --   --  0.0 AIG  --   --   --  0.1 Recent Labs  
  01/20/20 
0314 01/19/20 
0308 01/18/20 
2131 01/18/20 
1510  01/18/20 
0426  01/17/20 
1351  140  --   --   --  141  --   --   
K 3.2* 3.9 4.0 3.8   < > 3.8   < > 4.6  104  --   --   --  107  --   --   
CO2 30 29  --   --   --  27  --   --   
AGAP 7 7  --   --   --  7  --   --   
* 174*  --   --   --  139*  --   --   
BUN 13 13  --   --   --  40*  --   --   
CREA 1.55* 1.35  --   --   --  3.21*  --   --   
GFRAA 57* >60  --   --   --  25*  --   --   
GFRNA 47* 55*  --   --   --  20*  --   --   
CA 7.6* 7.7*  --   --   --  7.8*  --   --   
SGOT  --   --   --   --   --   --   --  55* AP  --   --   --   --   --   --   --  102 TP  --   --   --   --   --   --   --  5.1* ALB  --   --   --   --   --  1.9*  --  1.7*  
GLOB  --   --   --   --   --   --   --  3.4 AGRAT  --   --   --   --   --   --   --  0.5* MG 1.8 1.7*  --  1.8  --  2.1   < >  --   
PHOS 2.4  --   --   --   --  3.2  --   --   
 < > = values in this interval not displayed. Imaging: XR CHEST SNGL V [[id number]] Collected: 01/19/20 0528 Order Status: Completed Updated: 01/19/20 1145 Narrative:    
EXAM: XR CHEST SNGL V 
 
INDICATION: Coronary artery disease COMPARISON: 1/18/2020 FINDINGS: A portable AP radiograph of the chest was obtained at 0334 hours.  The 
 patient is on a cardiac monitor. Bibasilar airspace disease. Worse in the 
interval. The cardiac and mediastinal contours and pulmonary vascularity are 
normal.  The bones and soft tissues are grossly within normal limits. Impression:    
IMPRESSION: Findings most consistent with pulmonary edema worse in the interval.  
XR CHEST PORT [[id number]] Collected: 01/18/20 0502 Order Status: Completed Updated: 01/18/20 3845 Narrative:    
EXAM: XR CHEST PORT INDICATION: Coronary artery disease COMPARISON: 1/17/2020 FINDINGS: A portable AP radiograph of the chest was obtained at 0500 hours. The 
patient is on a cardiac monitor. Bibasilar airspace disease improved. The 
cardiac and mediastinal contours and pulmonary vascularity are normal.  The 
bones and soft tissues are grossly within normal limits. Impression:    
IMPRESSION: Bibasilar atelectasis improved. XR CHEST SNGL V [[id number]] Collected: 01/17/20 1009 Order Status: Completed Updated: 01/17/20 1024 Narrative:    
CHEST X-RAY, one view. HISTORY:  Pneumothorax status post open heart surgery, follow-up. TECHNIQUE:  AP upright portable view COMPARISON: Yesterday's exam 
 
FINDINGS:    
Small right apical pneumothorax is even smaller. Increased atelectasis or 
infiltrate at both bases. ET tube dialysis type catheter introducer sheath and 
sternal wires remain. Impression:    
IMPRESSION:  Decreased pneumothorax. Increased atelectasis or infiltrate at both 
bases. XR CHEST SNGL V [[id number]] Order Status: Canceled XR CHEST SNGL V [[id number]] Collected: 01/16/20 0427 Order Status: Completed Updated: 01/16/20 6415 Narrative:    
Clinical history: Post chest tube removal. 
 
TECHNIQUE: AP portable chest 
 
COMPARISON: Yesterday. FINDINGS: 
 
Previous left-sided chest tube is no longer seen. Endotracheal and enteric tubes 
and left-sided subclavian catheter are stable. Right [place]-Nallely catheter has been removed. Right IJ sheath is still present. Postsurgical changes of sternotomy. There is vascular congestion. Bibasilar opacities, likely atelectasis. There is 
suggestion of small right apical pneumothorax. There is tiny left apical 
pneumothorax. Impression:    
IMPRESSION: 
 
1. Previous left chest tube is no longer seen. Tiny right apical pneumothorax. 2. Suggestion of small right apical pneumothorax. 3. Bibasilar atelectasis and vascular congestion, similar to prior. XR CHEST SNGL V [[id number]] Collected: 01/15/20 1679 Order Status: Completed Updated: 01/15/20 5936 Narrative:    
 Portable view of the chest  
 
COMPARISON: Yesterday. CLINICAL HISTORY: Postop. FINDINGS: 
 
Stable postsurgical changes of sternotomy. Endotracheal tube, right [place]-Nallely 
catheter and left chest tube are stable. Enteric tube is not well-seen. Bibasilar opacities, likely atelectasis. There is vascular congestion. No 
pneumothorax. Cardiomediastinal contour and the surrounding bones are stable. Impression:    
IMPRESSION: 
 
1. Stable post op findings. No significant change in the appearance of the 
lungs. No pneumothorax. XR CHEST SNGL V [[id number]] Collected: 01/14/20 0610 Order Status: Completed Updated: 01/14/20 0231 Narrative:    
 Portable view of the chest  
 
COMPARISON: Yesterday. CLINICAL HISTORY: Postop. FINDINGS: 
 
Stable postsurgical changes of sternotomy. Endotracheal tube, enteric tube, 
right-sided [place]-Nallely catheter and left chest tube are stable. There is stable 
left subclavian catheter. Cardiac mediastinal contour and the surrounding bones 
are stable. No pneumothorax. There is vascular congestion. Bibasilar opacities, 
likely atelectasis. Impression:    
IMPRESSION: 
 
1. Stable post op findings. ET tube tip is in good position. 2. Vascular congestion and bibasilar atelectasis, similar to prior exam.  
XR CHEST SNGL V [[id number]] Collected: 01/13/20 0650 Order Status: Completed Updated: 01/13/20 6535 Narrative:    
 Portable view of the chest  
 
COMPARISON: Yesterday CLINICAL HISTORY: Postop, follow-up. FINDINGS: 
 
Endotracheal tube, enteric tube, right-sided [place]-Nallely catheter, and left chest 
tube are stable. Cardiac silhouette is enlarged, similar to prior exam. 
Bibasilar opacities, likely atelectasis. There is vascular congestion. No 
pneumothorax. Left-sided subclavian catheter is stable. Impression:    
IMPRESSION: 
 
1. Stable post op findings. ET tube tip is in good position. 2. Bibasilar atelectasis and vascular congestion. No pneumothorax. 3. No significant change compared to prior exam.  
XR CHEST SNGL V [[id number]] Collected: 01/12/20 4052 Order Status: Completed Updated: 01/12/20 1741 Narrative:    
 Portable view of the chest  
 
COMPARISON: January 12, 2020. CLINICAL HISTORY: Subclavian line placement FINDINGS: 
 
There is a new left subclavian catheter with the tip in the area of SVC/right 
atrium junction. Right [place]-Nallely catheter, enteric tube and endotracheal tube 
and left-sided chest tube are stable. There is vascular congestion. Bibasilar 
opacities, likely atelectasis. No significant pneumothorax. Cardiomediastinal 
contour and the surrounding bones are stable. Stable postsurgical changes of 
sternotomy. Impression:    
IMPRESSION: 
 
1. New left subclavian catheter. No significant pneumothorax. 2. Vascular congestion and bibasilar atelectasis. XR CHEST SNGL V [[id number]] Collected: 01/12/20 0301 Order Status: Completed Updated: 01/12/20 9230 Narrative:    
 Portable view of the chest  
 
Clinical history: Worsening hypercapnia, on vent COMPARISON: January 11, 2020. FINDINGS: 
 
Stable postsurgical changes of sternotomy. Endotracheal tube, enteric tube, 
right-sided [place]-Nallely catheter and left-sided chest tube are stable. There is small right apical pneumothorax. There is vascular congestion. Bibasilar 
opacities, likely atelectasis. Cardiomediastinal contour and the surrounding 
bones are stable. Impression:    
IMPRESSION: 
 
1. Small right apical pneumothorax, appearing slightly decreased since the prior 
exam. 
 
2. Vascular congestion and bibasilar atelectasis. XR CHEST SNGL V [[id number]] Order Status: Canceled XR CHEST SNGL V [[id number]] Collected: 01/11/20 1220 Order Status: Completed Updated: 01/11/20 1231 Narrative:    
PORTABLE CHEST, January 11, 2020 at 1200 hours CLINICAL HISTORY:  Follow-up right pneumothorax. COMPARISON:  0359 hours today. FINDINGS:  AP erect image demonstrates persistent small right apical 
pneumothorax.  The heart remains enlarged with pulmonary venous congestion and 
bibasilar atelectasis status post CABG.  Endotracheal, feeding, and left chest 
tubes as well as [place]-Nallely catheter remain in place. Impression:    
IMPRESSION:  NO SIGNIFICANT INTERVAL CHANGE, INCLUDING THE SMALL RIGHT APICAL PNEUMOTHORAX. [address] [place] [address] [[id number]] Collected: 01/11/20 1122 Order Status: Completed Updated: 01/11/20 1127 Narrative:    
ULTRASOUND OF THE KIDNEYS AND BLADDER 
 
CLINICAL HISTORY:  Acute on chronic kidney disease. COMPARISON:  None. FINDINGS: The examination was limited by the patient's morbid obesity as well as 
limited mobility and ability to suspend respirations.  Multiple images from real 
time ultrasound evaluation of the kidneys show that they are normal in size and 
orientation bilaterally.  The right kidney measures 12.3 cm in length while the 
left measures 12.4 cm.  No definite solid renal mass, hydronephrosis, stone, or 
abnormal perinephric collection is seen. The bladder was not distended at the 
time of examination. Impression:    
IMPRESSION:  Unremarkable, technically limited examination. XR CHEST SNGL V [[id number]] Collected: 01/11/20 0636 Order Status: Completed Updated: 01/11/20 3332 Narrative:    
 Portable view of the chest  
 
COMPARISON: January 10, 2020 CLINICAL HISTORY: Postop. FINDINGS: 
 
Stable postsurgical changes of sternotomy. Endotracheal tube, enteric tube and 
right-sided [place]-Nallely catheter and left-sided chest tube are stable. There is 
vascular congestion. Bibasilar opacities, likely atelectasis. There is small 
right apical pneumothorax, similar to prior exam. Cardiac mediastinal contour 
and the surrounding bones are stable. Impression:    
IMPRESSION: 
 
1. Stable small right apical pneumothorax. 2. Vascular congestion and bibasilar atelectasis. No change in the appearance of 
the lungs. XR CHEST SNGL V [[id number]] Collected: 01/10/20 1617 Order Status: Completed Updated: 01/10/20 1623 Narrative:    
Chest, one view, 1/10/2020 Indication:Postop cardiovascular surgery Comparison:1/8/2020 There are post median sternotomy changes. Support apparatus including [place]-Nallely 
catheter tip projects over the proximal right pulmonary artery. Endotracheal 
tube about 6 cm above the radha. There is an NG tube tip of which is not 
included on this exam. There is also left-sided chest tube. No pneumothorax. Patient appears to have had an aortic valve f replacement. There is perihilar 
atelectasis and likely a small left effusion. Impression:    
Impression: Recent median sternotomy changes with support apparatus as detailed 
above. Perihilar and likely a small left effusion. ASSESSMENT: 
 
Problem List  Date Reviewed: 12/6/2019 Codes Class Noted Bilateral pneumothorax ICD-10-CM: J93.9 ICD-9-CM: 512.89  1/17/2020 Thrombocytopenia ([facility] ICD-10-CM: D69.6 ICD-9-CM: 287.5  1/17/2020 Shock ([facility] ICD-10-CM: R57.9 ICD-9-CM: 785.50  1/15/2020 Atrial fibrillation [facility]) ICD-10-CM: I48.91 
ICD-9-CM: 427.31  1/13/2020 Acute renal failure (ARF) ([facility]) ICD-10-CM: N17.9 ICD-9-CM: 584.9  1/11/2020 Aortic valve stenosis ICD-10-CM: I35.0 ICD-9-CM: 424.1  1/10/2020 * (Principal) CAD (coronary artery disease) ICD-10-CM: I25.10 ICD-9-CM: 414.00  1/10/2020 Weaning from respirator [facility]) ICD-10-CM: Z99.11 ICD-9-CM: V46.13  1/10/2020 Acute hypoxemic respiratory failure ([facility] ICD-10-CM: J96.01 
ICD-9-CM: 518.81  1/10/2020 S/P CABG x 3 ICD-10-CM: Z95.1 ICD-9-CM: V45.81  1/10/2020 S/P AVR ICD-10-CM: Z95.2 ICD-9-CM: V43.3  1/10/2020 Bilateral carotid artery stenosis ICD-10-CM: I65.23 ICD-9-CM: 433.10, 433.30  11/24/2019 Overview Signed 11/24/2019  8:06 PM by Oh Hayes MD  
  1. Carotid Duplex (11/20/19): Bilateral 50-69% ICA stenosis. Type 2 diabetes with nephropathy ([facility] ICD-10-CM: E11.21 
ICD-9-CM: 250.40, 583.81  8/26/2019 Obesity, morbid ([facility] ICD-10-CM: E66.01 
ICD-9-CM: 278.01  10/19/2018 Nonrheumatic aortic valve stenosis ICD-10-CM: I35.0 ICD-9-CM: 424.1  4/13/2018 Overview Addendum 9/18/2019  6:16 PM by Oh Hayes MD  
  1. Echo (10/15/18): EF 55-60%. Moderate to severe Aortic Stenosis. MG 31.  PG 52.  DI 0.25. 
2.  Echo (9/11/19):  EF 60-65%. Mild LAE. SEvere AS. MG 40. PG 61. DI=0.23. Mild to moderate MR. DM type 2 (diabetes mellitus, type 2) (HCC) ICD-10-CM: E11.9 ICD-9-CM: 250.00  1/14/2013 Gout ICD-10-CM: M10.9 ICD-9-CM: 274.9  1/14/2013 HTN (hypertension) ICD-10-CM: I10 
ICD-9-CM: 401.9  1/14/2013 BPH (benign prostatic hyperplasia) ICD-10-CM: N40.0 ICD-9-CM: 600.00  1/14/2013 Seasonal allergic rhinitis ICD-10-CM: J30.2 ICD-9-CM: 477.9  1/14/2013 HLD (hyperlipidemia) ICD-10-CM: E78.5 ICD-9-CM: 272.4  1/14/2013 Mr. Cee Diaz is a 69 y.o. male admitted on 1/10/2020.   His PMH includes DM, thyroid disease, prior h/o CAD with recent [facility] 12/31/19 showing severe multivessel coronary artery disease in the setting of known severe aortic stenosis. Patient was seen by CTS and set up for surgery on 1/10/2020. Patient underwent CABG x3 (LIMA>LAD, SVG>OM, SVG>PDA) and aortic valve replacement with a 25-mm Intuity Jon-Gutierres valve. He became hypoxic, hypotensive and hard to ventilate. Pulmonary seeing patient and he remains intubated and sedated. He is also on pressors. Nephrology saw patient for worsening renal failure and CRRT was started on 1/12/2020. The patient went into AFib on 1/12 and was started on IV amiodarone. He was started on Azactam/Vanc for sepsis. CXR with increased atelectasis or infiltrate at both bases. BCx-NGTD. UCx-NG. Sputum Cx with light normal resp yessenia. On admission, WBC 16.9, Hgb 9.0, Plt 98k. On 1/17, WBC 9.3/ANC 7.6, Hgb 8.1, Plt 15k. Today platelets up to 01,929 without intervention. HIT/SHARON pending. Primary team changed heparin to argatroban. We were consulted for thrombocytopenia. 
  
PLAN: 
Thrombocytopenia - Plt 98k on admission, down to 15k 
- marrow suppression from sepsis/shock likely contributing 
- HIT pending - Check DIC labs, hemolysis labs, and peripheral smear - Primary team has changed heparin to Argatroban - Transfuse plt prn 
01/18 Platelets have rebounded on their own, without transfusion to 33k today. DIC labs not impressive - repeat today for completeness. Smear and haptoglobin pending, follow. HIT/SHARON pending. As soon as HIT returns as negative, may stop argatroban as platelets are so low. His worsening thrombocytopenia is likely multifactorial related to recent clinical course, sepsis with transient marrow suppression and increased consumption, along with exposure to various medications including amiodarone.  Expect his platelets to improve over time as his clinical condition improves.  In interim he should be transfused to keep his platelets over 36,366. ODMR improving. 01/20 HIT positive, await SHARON for confirmation. Platelets up to 82,967 today. Positive upper and lower extremity DVT's. Continue argatroban. No platelets unless bleeding.  
  
Anemia - Transfuse prn to keep Hgb >8 
- DEJUAN on CRRT / marrow suppression from sepsis/shock - Tsat 17%, ferritin 557. Check folate, B12, hemolysis labs 01/19 Continue to follow haptoglobin and smear.  
  
DEJUAN 
- Neph following, on CRRT 
  
Sepsis/Shock 
- on pressor support - On Azactam/Vanc - Cultures NGTD 
  
Hypoxia  
- intubated on vent - Pulm following 
  
Afib - Cards managing 
  
CAD 
- s/p CABG 
  
Lab studies and imaging studies were personally reviewed. Thank you for allowing us to participate in the care of Mr. Sofia Tenorio. Val Martinez  [address] Hematology Oncology [address] [place] [address] Office : [phone] Fax : [phone] Attending Addendum: 
I have personally performed a face to face diagnostic evaluation on this patient. I have reviewed and agree with the care plan as documented by Amberly Smiley N.P. My findings are as follows:  He has LILIBETH, appears intubated and sedated, heart rate regular without murmurs, abdomen is non-tender, bowel sounds are positive, we will continue Argatroban, avoid Platelet transfusions. Gibson Mercado MD 
 
 
[facility] Hematology/Oncology [address] [place] [address] Office : [phone] Fax : [phone]

## 2020-01-20 NOTE — PROGRESS NOTES
Nutrition F/U: 
TPN Management for Cardiothoracic Surgery. Assessment: 
Weight 150.1 kg (CVICU bed 1/20/2020), edema - anasarca; 4+ pitting all extremities. The patient remains intubated, ventilated, sedated and NPO. TF has been off since 1/18 when his gastric residual reached 400 ml when checked. RN Mary Madera) reports that his gastric residual today was 150 ml despite TF being off for almost 2 days now. Abdomen remains quiet. TPN was started yesterday for nutrition support until GI activity returns. Noted Vasopressin is off and Levophed is at a very low rate. 24-hour SLED was started today. Labs are remarkable for potassium 3.2 and AM glucose 126. IV Access: 
 Double PICC. Enteral Access: OGT Macronutrient Needs ( kg and IBW 75.5 kg): EER:  [id number] kcal /day (11-14 kcal/kg UBW) 
EPR:  128-151 grams protein/day (1.7-2 grams/kg IBW) - CRRT Max CHO:   435 grams/day (4mg/kgIBW/min - TPN) vs 238 grams/day (50% kcal - TF) Fluid:  0166-2762 ml/d Intake/Comparative Standards: 
Current intake of TPN (1 liter of 4.25% AA/10% DEX with 250 ml 20% lipids) provides 1010 calories/day (66% calorie goal), 42.5 grams protein/day (33% protein goal), 100 grams CHO/day (does not exceed max CHO limit) and 1330 ml water/day (100% fluid goal). Intervention:  
Meals and Snacks: NPO. Enteral Nutrition: TF remains on hold. May need to try giving liquid protein enterally since we are unable to meet his daily protein needs with TPN. Parenteral Nutrition: 1) Change TPN to 1 liter 5%AA/15%DEX (43 ml/hr) with 250 ml 20% lipids -1210 calories/day (81% calorie goal), 50 grams protein/day (39% protein goal), 150 grams CHO/day (does not exceed max CHO limit) and 1330 ml water/day (100% fluid goal). Ethelene Gauss 2) Additives/liter: 30 sodium (chloride), 40 potassium (20 phosphate, 20 chloride), 4.5 calcium, 8 magnesium, 20 phosphorus (potassium). 3) MTE, MVI and renal-dose Pepcid daily. Mineral Supplement Therapy: Nutrition Support Orders/Electrolyte Management Replacement Protocols are active on the MAR. 20 meq IV KCl x 1 today. Coordination of Nutrition Care by a Nutrition Professional: Collaboration with Rupa Noland RN. Nutrition Discharge Plan: Too soon to determine. Dalila Domínguez. Rhina Medina 
[phone]

## 2020-01-21 NOTE — PROGRESS NOTES
[address] Hematology Oncology Inpatient Hematology / Oncology Progress Note Admission Date: 1/10/2020  4:59 AM 
Reason for Admission/Hospital Course: Atherosclerosis of native coronary artery of native heart with unstable angina pectoris ([facility] [address] [I25.110] Nonrheumatic aortic valve stenosis [I35.0] CAD (coronary artery disease) [I25.10] Aortic valve stenosis [I35.0] 24 Hour Events: 
Remains intubated Critical condition Continues to require pressor support HIT positive, awaiting confirmation per [facility] Plt down to [facility] On Argatroban ROS: unable to determine due to intubation Allergies Allergen Reactions  Heparin Other (comments) HIT antibody test strongly positive on 1/17/2020. [facility] drawn 1/18/2020 and still pending final results.  Amoxicillin Rash  Keflex [Cephalexin] Rash  Pcn [Penicillins] Other (comments) \"felt closed in\". No rash OBJECTIVE: 
Patient Vitals for the past 8 hrs: 
 BP Pulse Resp SpO2 Weight  
01/21/20 1110 117/62 68     
01/21/20 1030  83 (!) 39 93 %   
01/21/20 1029 140/65 81 (!) 42 95 %   
01/21/20 1015  84 (!) 33 96 %   
01/21/20 1000 150/77 81 22 96 %   
01/21/20 0945  79 26 99 %   
01/21/20 0930  80 (!) 31 93 %   
01/21/20 0926 147/55 77     
01/21/20 0915  72 (!) 0 90 %   
01/21/20 0900 146/66 77 17 (!) 87 %   
01/21/20 0845  77 10 91 %   
01/21/20 0830  74  94 %   
01/21/20 0829 139/71 74 26 92 %   
01/21/20 0828  73 16 94 %   
01/21/20 0815  75  94 %   
01/21/20 0801 144/85 80 (!) 39 96 %   
01/21/20 0800  80 (!) 52 95 %   
01/21/20 0745  83 (!) 37 92 %   
01/21/20 0730 127/67 73 27 96 %   
01/21/20 0715  68 (!) 31 96 %   
01/21/20 0700 123/67 70 (!) 33 98 %   
01/21/20 0530  64 13 96 %   
01/21/20 0515  67 16 97 %   
01/21/20 0509 151/62 80     
01/21/20 0500  68 22 94 %   
01/21/20 0451 137/63 69 12 93 %   
01/21/20 0445  69  (!) 86 %   
01/21/20 0430  76  91 %   
 20 0415  66 23 93 %   
20 0400 159/75 80 25 92 % 330 lb 14.6 oz (150.1 kg) 20 0345  79 (!) 40 93 %  Temp (24hrs), Av.1 °F (36.2 °C), Min:97 °F (36.1 °C), Max:97.2 °F (36.2 °C) No intake/output data recorded. Physical Exam: 
Constitutional: Acutely ill-appearing elderly male in no acute distress, lying comfortably in the hospital bed. HEENT: Normocephalic and atraumatic. Neck supple without JVD. No thyromegaly present. Skin Warm and dry. No bruising and no rash noted. No erythema. No pallor. Respiratory Lungs are coarse to auscultation bilaterally. On vent. CVS Normal rate, regular rhythm and normal S1 and S2. No murmurs, gallops, or rubs. Abdomen Soft, nontender and nondistended, normoactive bowel sounds. No palpable mass. No hepatosplenomegaly. Neuro Sedated on vent MSK 1+ BLE pitting edema. Dusky toes. Psych Sedated on vent Labs: 
   
Recent Labs  
  20 
03220 
0314 20 
0308 WBC 14.6* 11.2* 10.6 RBC 2.69* 2.79* 2.73* HGB 8.4* 8.8* 8.6* HCT 26.3* 27.2* 26.8*  
MCV 97.8 97.5 98.2*  
MCH 31.2 31.5 31.5 MCHC 31.9 32.4 32.1 RDW 14.2 14.2 14.4 PLT 20* 28* 19* Recent Labs  
  20 
03220 
0314 20 
0308  141 140  
K 3.8 3.2* 3.9 * 104 104 CO2 31 30 29 AGAP 4* 7 7 * 126* 174* BUN 8 13 13 CREA 1.08 1.55* 1.35 GFRAA >60 57* >60  
GFRNA >60 47* 55* CA 7.7* 7.6* 7.7* MG 1.6* 1.8 1.7* PHOS  --  2.4  --   
 
 
 
Imaging: XR CHEST SNGL V [[id number]] Collected: 20 0528 Order Status: Completed Updated: 20 7188 Narrative:    
EXAM: XR CHEST SNGL V 
 
INDICATION: Coronary artery disease COMPARISON: 2020 FINDINGS: A portable AP radiograph of the chest was obtained at 0334 hours. The 
patient is on a cardiac monitor. Bibasilar airspace disease.  Worse in the 
interval. The cardiac and mediastinal contours and pulmonary vascularity are 
 normal.  The bones and soft tissues are grossly within normal limits. Impression:    
IMPRESSION: Findings most consistent with pulmonary edema worse in the interval.  
XR CHEST PORT [[id number]] Collected: 01/18/20 0502 Order Status: Completed Updated: 01/18/20 4532 Narrative:    
EXAM: XR CHEST PORT INDICATION: Coronary artery disease COMPARISON: 1/17/2020 FINDINGS: A portable AP radiograph of the chest was obtained at 0500 hours. The 
patient is on a cardiac monitor. Bibasilar airspace disease improved. The 
cardiac and mediastinal contours and pulmonary vascularity are normal.  The 
bones and soft tissues are grossly within normal limits. Impression:    
IMPRESSION: Bibasilar atelectasis improved. XR CHEST SNGL V [[id number]] Collected: 01/17/20 1009 Order Status: Completed Updated: 01/17/20 1024 Narrative:    
CHEST X-RAY, one view. HISTORY:  Pneumothorax status post open heart surgery, follow-up. TECHNIQUE:  AP upright portable view COMPARISON: Yesterday's exam 
 
FINDINGS:    
Small right apical pneumothorax is even smaller. Increased atelectasis or 
infiltrate at both bases. ET tube dialysis type catheter introducer sheath and 
sternal wires remain. Impression:    
IMPRESSION:  Decreased pneumothorax. Increased atelectasis or infiltrate at both 
bases. XR CHEST SNGL V [[id number]] Order Status: Canceled XR CHEST SNGL V [[id number]] Collected: 01/16/20 0427 Order Status: Completed Updated: 01/16/20 5282 Narrative:    
Clinical history: Post chest tube removal. 
 
TECHNIQUE: AP portable chest 
 
COMPARISON: Yesterday. FINDINGS: 
 
Previous left-sided chest tube is no longer seen. Endotracheal and enteric tubes 
and left-sided subclavian catheter are stable. Right [place] catheter has been 
removed. Right IJ sheath is still present. Postsurgical changes of sternotomy. There is vascular congestion. Bibasilar opacities, likely atelectasis. There is suggestion of small right apical pneumothorax. There is tiny left apical 
pneumothorax. Impression:    
IMPRESSION: 
 
1. Previous left chest tube is no longer seen. Tiny right apical pneumothorax. 2. Suggestion of small right apical pneumothorax. 3. Bibasilar atelectasis and vascular congestion, similar to prior. XR CHEST SNGL V [[id number]] Collected: 01/15/20 6165 Order Status: Completed Updated: 01/15/20 5076 Narrative:    
 Portable view of the chest  
 
COMPARISON: Yesterday. CLINICAL HISTORY: Postop. FINDINGS: 
 
Stable postsurgical changes of sternotomy. Endotracheal tube, right [place]alle[place] 
catheter and left chest tube are stable. Enteric tube is not well-seen. Bibasilar opacities, likely atelectasis. There is vascular congestion. No 
pneumothorax. Cardiomediastinal contour and the surrounding bones are stable. Impression:    
IMPRESSION: 
 
1. Stable post op findings. No significant change in the appearance of the 
lungs. No pneumothorax. XR CHEST SNGL V [[id number]] Collected: 01/14/20 0610 Order Status: Completed Updated: 01/14/20 2177 Narrative:    
 Portable view of the chest  
 
COMPARISON: Yesterday. CLINICAL HISTORY: Postop. FINDINGS: 
 
Stable postsurgical changes of sternotomy. Endotracheal tube, enteric tube, 
right-sided [place]alle[place] catheter and left chest tube are stable. There is stable 
left subclavian catheter. Cardiac mediastinal contour and the surrounding bones 
are stable. No pneumothorax. There is vascular congestion. Bibasilar opacities, 
likely atelectasis. Impression:    
IMPRESSION: 
 
1. Stable post op findings. ET tube tip is in good position. 2. Vascular congestion and bibasilar atelectasis, similar to prior exam.  
XR CHEST SNGL V [[id number]] Collected: 01/13/20 0650 Order Status: Completed Updated: 01/13/20 7543 Narrative:    
 Portable view of the chest  
 
COMPARISON: Yesterday CLINICAL HISTORY: Postop, follow-up.  
 
FINDINGS: 
 
 Endotracheal tube, enteric tube, right-sided [place]-Nallely catheter, and left chest 
tube are stable. Cardiac silhouette is enlarged, similar to prior exam. 
Bibasilar opacities, likely atelectasis. There is vascular congestion. No 
pneumothorax. Left-sided subclavian catheter is stable. Impression:    
IMPRESSION: 
 
1. Stable post op findings. ET tube tip is in good position. 2. Bibasilar atelectasis and vascular congestion. No pneumothorax. 3. No significant change compared to prior exam.  
XR CHEST SNGL V [[id number]] Collected: 01/12/20 7823 Order Status: Completed Updated: 01/12/20 8595 Narrative:    
 Portable view of the chest  
 
COMPARISON: January 12, 2020. CLINICAL HISTORY: Subclavian line placement FINDINGS: 
 
There is a new left subclavian catheter with the tip in the area of SVC/right 
atrium junction. Right [place]-Nallely catheter, enteric tube and endotracheal tube 
and left-sided chest tube are stable. There is vascular congestion. Bibasilar 
opacities, likely atelectasis. No significant pneumothorax. Cardiomediastinal 
contour and the surrounding bones are stable. Stable postsurgical changes of 
sternotomy. Impression:    
IMPRESSION: 
 
1. New left subclavian catheter. No significant pneumothorax. 2. Vascular congestion and bibasilar atelectasis. XR CHEST SNGL V [[id number]] Collected: 01/12/20 0301 Order Status: Completed Updated: 01/12/20 5525 Narrative:    
 Portable view of the chest  
 
Clinical history: Worsening hypercapnia, on vent COMPARISON: January 11, 2020. FINDINGS: 
 
Stable postsurgical changes of sternotomy. Endotracheal tube, enteric tube, 
right-sided [place]-Nallely catheter and left-sided chest tube are stable. There is 
small right apical pneumothorax. There is vascular congestion. Bibasilar 
opacities, likely atelectasis. Cardiomediastinal contour and the surrounding 
bones are stable.  
  
Impression:    
IMPRESSION: 
 
 1. Small right apical pneumothorax, appearing slightly decreased since the prior 
exam. 
 
2. Vascular congestion and bibasilar atelectasis. XR CHEST SNGL V [[id number]] Order Status: Canceled XR CHEST SNGL V [[id number]] Collected: 01/11/20 1220 Order Status: Completed Updated: 01/11/20 1231 Narrative:    
PORTABLE CHEST, January 11, 2020 at 1200 hours CLINICAL HISTORY:  Follow-up right pneumothorax. COMPARISON:  0359 hours today. FINDINGS:  AP erect image demonstrates persistent small right apical 
pneumothorax.  The heart remains enlarged with pulmonary venous congestion and 
bibasilar atelectasis status post CABG.  Endotracheal, feeding, and left chest 
tubes as well as [place]-Nallely catheter remain in place. Impression:    
IMPRESSION:  NO SIGNIFICANT INTERVAL CHANGE, INCLUDING THE SMALL RIGHT APICAL PNEUMOTHORAX. [address] [place] [address] [[id number]] Collected: 01/11/20 1122 Order Status: Completed Updated: 01/11/20 1127 Narrative:    
ULTRASOUND OF THE KIDNEYS AND BLADDER 
 
CLINICAL HISTORY:  Acute on chronic kidney disease. COMPARISON:  None. FINDINGS: The examination was limited by the patient's morbid obesity as well as 
limited mobility and ability to suspend respirations.  Multiple images from real 
time ultrasound evaluation of the kidneys show that they are normal in size and 
orientation bilaterally.  The right kidney measures 12.3 cm in length while the 
left measures 12.4 cm.  No definite solid renal mass, hydronephrosis, stone, or 
abnormal perinephric collection is seen. The bladder was not distended at the 
time of examination. Impression:    
IMPRESSION:  Unremarkable, technically limited examination. XR CHEST SNGL V [[id number]] Collected: 01/11/20 0636 Order Status: Completed Updated: 01/11/20 0159 Narrative:    
 Portable view of the chest  
 
COMPARISON: January 10, 2020 CLINICAL HISTORY: Postop.  
 
FINDINGS: 
 
 Stable postsurgical changes of sternotomy. Endotracheal tube, enteric tube and 
right-sided [place] catheter and left-sided chest tube are stable. There is 
vascular congestion. Bibasilar opacities, likely atelectasis. There is small 
right apical pneumothorax, similar to prior exam. Cardiac mediastinal contour 
and the surrounding bones are stable. Impression:    
IMPRESSION: 
 
1. Stable small right apical pneumothorax. 2. Vascular congestion and bibasilar atelectasis. No change in the appearance of 
the lungs. XR CHEST SNGL V [[id number]] Collected: 01/10/20 1617 Order Status: Completed Updated: 01/10/20 1623 Narrative:    
Chest, one view, 1/10/2020 Indication:Postop cardiovascular surgery Comparison:1/8/2020 There are post median sternotomy changes. Support apparatus including [place]ly 
catheter tip projects over the proximal right pulmonary artery. Endotracheal 
tube about 6 cm above the radha. There is an NG tube tip of which is not 
included on this exam. There is also left-sided chest tube. No pneumothorax. Patient appears to have had an aortic valve f replacement. There is perihilar 
atelectasis and likely a small left effusion. Impression:    
Impression: Recent median sternotomy changes with support apparatus as detailed 
above. Perihilar and likely a small left effusion. ASSESSMENT: 
 
Problem List  Date Reviewed: 12/6/2019 Codes Class Noted Bilateral pneumothorax ICD-10-CM: J93.9 ICD-9-CM: 512.89  1/17/2020 Thrombocytopenia ([facility] [address] ICD-10-CM: D69.6 ICD-9-CM: 287.5  1/17/2020 Shock ([facility] [address] ICD-10-CM: R57.9 ICD-9-CM: 785.50  1/15/2020 Atrial fibrillation [facility]) ICD-10-CM: I48.91 
ICD-9-CM: 427.31  1/13/2020 Acute renal failure (ARF) (HCC) ICD-10-CM: N17.9 ICD-9-CM: 584.9  1/11/2020 Aortic valve stenosis ICD-10-CM: I35.0 ICD-9-CM: 424.1  1/10/2020 * (Principal) CAD (coronary artery disease) ICD-10-CM: I25.10 ICD-9-CM: 414.00  1/10/2020 Weaning from respirator [facility]) ICD-10-CM: Z99.11 ICD-9-CM: V46.13  1/10/2020 Acute hypoxemic respiratory failure ([facility] ICD-10-CM: J96.01 
ICD-9-CM: 518.81  1/10/2020 S/P CABG x 3 ICD-10-CM: Z95.1 ICD-9-CM: V45.81  1/10/2020 S/P AVR ICD-10-CM: Z95.2 ICD-9-CM: V43.3  1/10/2020 Bilateral carotid artery stenosis ICD-10-CM: I65.23 ICD-9-CM: 433.10, 433.30  11/24/2019 Overview Signed 11/24/2019  8:06 PM by Arcola Nissen, MD  
  1. Carotid Duplex (11/20/19): Bilateral 50-69% ICA stenosis. Type 2 diabetes with nephropathy ([facility] ICD-10-CM: E11.21 
ICD-9-CM: 250.40, 583.81  8/26/2019 Obesity, morbid ([facility] ICD-10-CM: E66.01 
ICD-9-CM: 278.01  10/19/2018 Nonrheumatic aortic valve stenosis ICD-10-CM: I35.0 ICD-9-CM: 424.1  4/13/2018 Overview Addendum 9/18/2019  6:16 PM by Arcola Nissen, MD  
  1. Echo (10/15/18): EF 55-60%. Moderate to severe Aortic Stenosis. MG 31.  PG 52.  DI 0.25. 
2.  Echo (9/11/19):  EF 60-65%. Mild LAE. SEvere AS. MG 40. PG 61. DI=0.23. Mild to moderate MR. DM type 2 (diabetes mellitus, type 2) (HCC) ICD-10-CM: E11.9 ICD-9-CM: 250.00  1/14/2013 Gout ICD-10-CM: M10.9 ICD-9-CM: 274.9  1/14/2013 HTN (hypertension) ICD-10-CM: I10 
ICD-9-CM: 401.9  1/14/2013 BPH (benign prostatic hyperplasia) ICD-10-CM: N40.0 ICD-9-CM: 600.00  1/14/2013 Seasonal allergic rhinitis ICD-10-CM: J30.2 ICD-9-CM: 477.9  1/14/2013 HLD (hyperlipidemia) ICD-10-CM: E78.5 ICD-9-CM: 272.4  1/14/2013 Mr. Jc Diaz is a 69 y.o. male admitted on 1/10/2020. His PMH includes DM, thyroid disease, prior h/o CAD with recent [facility] 12/31/19 showing severe multivessel coronary artery disease in the setting of known severe aortic stenosis. Patient was seen by CTS and set up for surgery on 1/10/2020.  Patient underwent CABG x3 (LIMA>LAD, SVG>OM, SVG>PDA) and aortic valve replacement with a 25-mm Intuity Jon-Gutierres valve. He became hypoxic, hypotensive and hard to ventilate. Pulmonary seeing patient and he remains intubated and sedated. He is also on pressors. Nephrology saw patient for worsening renal failure and CRRT was started on 1/12/2020. The patient went into AFib on 1/12 and was started on IV amiodarone. He was started on Azactam/Vanc for sepsis. CXR with increased atelectasis or infiltrate at both bases. BCx-NGTD. UCx-NG. Sputum Cx with light normal resp yessenia. On admission, WBC 16.9, Hgb 9.0, Plt 98k. On 1/17, WBC 9.3/ANC 7.6, Hgb 8.1, Plt 15k. Today platelets up to 69,427 without intervention. HIT/SHARON pending. Primary team changed heparin to argatroban. We were consulted for thrombocytopenia. 
  
PLAN: 
Thrombocytopenia / DVTs 
- Plt 98k on admission, down to 15k 
- marrow suppression from sepsis/shock likely contributing 
- HIT pending - Check DIC labs, hemolysis labs, and peripheral smear - Primary team has changed heparin to Argatroban - Transfuse plt prn 
01/18 Platelets have rebounded on their own, without transfusion to 33k today. DIC labs not impressive - repeat today for completeness. Smear and haptoglobin pending, follow. HIT/SHARON pending. As soon as HIT returns as negative, may stop argatroban as platelets are so low. His worsening thrombocytopenia is likely multifactorial related to recent clinical course, sepsis with transient marrow suppression and increased consumption, along with exposure to various medications including amiodarone.  Expect his platelets to improve over time as his clinical condition improves.  In interim he should be transfused to keep his platelets over 66,347. PDSZ improving. 01/21 HIT positive, await SHARON for confirmation. Platelets down to 00G today. Positive upper and lower extremity DVT's. Continue argatroban. No platelets unless bleeding.  
  
Anemia - Transfuse prn to keep Hgb >8 
 - DEJUAN on CRRT / marrow suppression from sepsis/shock - Tsat 17%, ferritin 557. Check folate, B12, hemolysis labs 01/19 Continue to follow haptoglobin and smear. 1/21 Hgb stable, 8.4. No hemolysis noted. No schistocytes seen on smear. 
  
DEJUAN 
- Neph following, on CRRT 
  
Sepsis/Shock 
- on pressor support - On Azactam/Vanc - Cultures NGTD 
1/21 No longer on antibx. Cultures neg. Continues on pressor support. 
  
Hypoxia  
- intubated on vent - Pulm following 1/21 remains intubated on vent 
  
Afib - Cards managing 
  
CAD 
- s/p CABG 
  
Thank you for allowing us to participate in the care of Mr. Winford Phoenix. Caitlin Katz  [address] Hematology Oncology [address] [place] [address] Office : [phone] Fax : [phone] Attending Addendum: 
I have personally performed a face to face diagnostic evaluation on this patient. I have reviewed and agree with the care plan as documented by Caitlin Katz, N.P. My findings are as follows:  He has LILIBETH, appears intubated, heart rate regular without murmurs, abdomen is non-tender, bowel sounds are positive, we will follow-up the results of his SHARON, continue Argatroban and do not transfuse Platelets. Tanisha Dalton MD 
 
 
[address] [facility] Hematology/Oncology [address] [place] [address] Office : [phone] Fax : [phone]

## 2020-01-21 NOTE — PROGRESS NOTES
[facility] PROGRESS NOTE Follow up for: Tristin over CKD - HD dependent Subjective: On levophed On insulin , argatroban and precedex Intubated ROS: 
UTO Objective:  
Exam: 
Vitals:  
 01/21/20 3674 01/21/20 0515 01/21/20 0530 01/21/20 8866 BP: 151/62 Pulse: 80 67 64 73 Resp:  16 13 16 Temp:      
SpO2:  97% 96% 94% Weight:      
Height:      
 
 
 
Intake/Output Summary (Last 24 hours) at 1/21/2020 1261 Last data filed at 1/21/2020 9642 Gross per 24 hour Intake 2100.3 ml Output 3931 ml Net -1830.7 ml  
 
 
Current Facility-Administered Medications Medication Dose Route Frequency  magnesium sulfate 2 g/50 ml IVPB (premix or compounded)  2 g IntraVENous ONCE  
 TPN ADULT-CENTRAL - dextrose 15% amino acid 5%   IntraVENous QPM  
 amiodarone (CORDARONE) 450 mg in dextrose 5% 250 mL infusion  0.5-1 mg/min IntraVENous TITRATE  dexmedeTOMidine (PRECEDEX) 400 mcg in 0.9% sodium chloride 100 mL infusion  0.2-0.7 mcg/kg/hr IntraVENous TITRATE  amiodarone (CORDARONE) tablet 400 mg  400 mg Per NG tube Q12H  
 fat emulsion 20% (LIPOSYN, INTRAlipid) infusion 250 mL  250 mL IntraVENous QPM  
 0.9% sodium chloride infusion 250 mL  250 mL IntraVENous PRN  
 epoetin maritza-epbx (RETACRIT) 14,000 Units combo injection  14,000 Units SubCUTAneous Q7D  
 argatroban 50 mg in 0.9% sodium chloride 50 mL (1000 mcg/mL) infusion  0.5-10 mcg/kg/min IntraVENous TITRATE  alcohol 62% (NOZIN) nasal  1 Ampule  1 Ampule Topical Q12H  
 NUTRITIONAL SUPPORT ELECTROLYTE PRN ORDERS   Does Not Apply PRN  
 NOREPINephrine (LEVOPHED) 16,000 mcg in dextrose 5% 250 mL infusion  0.05-0.2 mcg/kg/min IntraVENous TITRATE  
 0.9% sodium chloride infusion 250 mL  250 mL IntraVENous PRN  
 albuterol (PROVENTIL VENTOLIN) nebulizer solution 2.5 mg  2.5 mg Nebulization QID PRN  
 fentaNYL in normal saline (pf) 25 mcg/mL infusion  0-200 mcg/hr IntraVENous TITRATE  midazolam (VERSED) 100 mg in 0.9% sodium chloride 100 mL infusion  0-10 mg/hr IntraVENous TITRATE  acetaminophen (TYLENOL) solution 650 mg  650 mg Per NG tube Q4H PRN  
 0.9% sodium chloride infusion  25 mL/hr IntraVENous CONTINUOUS  
 sodium chloride (NS) flush 5-40 mL  5-40 mL IntraVENous Q8H  
 sodium chloride (NS) flush 5-40 mL  5-40 mL IntraVENous PRN  
 oxyCODONE-acetaminophen (PERCOCET) 5-325 mg per tablet 1 Tab  1 Tab Oral Q4H PRN  
 morphine 10 mg/ml injection 3 mg  3 mg IntraVENous Q1H PRN  
 naloxone (NARCAN) injection 0.4 mg  0.4 mg IntraVENous PRN  
 EPINEPHrine (ADRENALIN) 4 mg in 0.9% sodium chloride 250 mL infusion  0.04-0.1 mcg/kg/min IntraVENous TITRATE  [Held by provider] metoprolol tartrate (LOPRESSOR) tablet 25 mg  25 mg Oral Q12H  
 atorvastatin (LIPITOR) tablet 80 mg  80 mg Oral QHS  insulin regular (NOVOLIN R, HUMULIN R) 100 Units in 0.9% sodium chloride 100 mL infusion  1 Units/hr IntraVENous TITRATE  dextrose (D50W) injection syrg 12.5 g  25 mL IntraVENous PRN  
 [Held by provider] aspirin chewable tablet 81 mg  81 mg Oral DAILY  magnesium sulfate 1 g/100 ml IVPB (premix or compounded)  1 g IntraVENous PRN  
 midazolam (VERSED) injection 1 mg  1 mg IntraVENous Q1H PRN  chlorhexidine (PERIDEX) 0.12 % mouthwash 10 mL  10 mL Oral BID  morphine 10 mg/ml injection 4 mg  4 mg IntraVENous Q1H PRN Or  
 morphine 10 mg/ml injection 5 mg  5 mg IntraVENous Q1H PRN  
 dextrose 5 % - 0.45% NaCl infusion  25 mL/hr IntraVENous CONTINUOUS  
 vasopressin (VASOSTRICT) 20 Units in 0.9% sodium chloride 100 mL infusion  0-0.1 Units/min IntraVENous TITRATE  PHENYLephrine (PF)(LEWIS-SYNEPHRINE) 30 mg in 0.9% sodium chloride 250 mL infusion   mcg/min IntraVENous TITRATE  sodium bicarbonate (8.4%) injection 50 mEq  50 mEq IntraVENous PRN  
 
 
EXAM 
GEN - Intubated and sedated CV - S1, S2, RRR, no rub, murmur, or gallop Lung - clear to auscultation bilaterally Abd - soft, nontender, BS present Ext - 3 plus pitting edema present . Cyanosis of finger and digits seen Recent Labs  
  01/21/20 
0329 01/20/20 
0314 01/19/20 
0308 WBC 14.6* 11.2* 10.6 HGB 8.4* 8.8* 8.6* HCT 26.3* 27.2* 26.8*  
PLT 20* 28* 19* Recent Labs  
  01/21/20 
0329 01/20/20 
0314 01/19/20 
0308  141 140  
K 3.8 3.2* 3.9 * 104 104 CO2 31 30 29 BUN 8 13 13 CREA 1.08 1.55* 1.35  
CA 7.7* 7.6* 7.7*  
* 126* 174* MG 1.6* 1.8 1.7* PHOS  --  2.4  -- Assessment and Plan: A on CKD - started on dialysis ( CRRT )  on 1/12 for hyperkalemia and volume overload Seen on SLED , tolerating okay , currently BP  stable on levophed Electrolytes wnl Will evaluate on a daily basis for the  need for SLED/HD 
  
ASHD/ AS - s/p CABG times 3 / Aortic Valve replacement on 1/10  
  
DM 
  
HTN/ Volume - Volume overload presnet 
  
Resp Failure -intubated  
  Thrombocytopenia- HIT positive on Argatroban 
  
 
 
Johna Barthel, MD

## 2020-01-21 NOTE — DIALYSIS
CRRT extracorporeal circuit clotted. New line circuit in place and CRRT resumed with 12 hours remaining. Machine orders remain the same: , , . Report given to primary RN.

## 2020-01-21 NOTE — PROGRESS NOTES
Ventilator check complete; patient has a #8. 0 ET tube secured at the 24cm at the lip. Patient is not sedated. Patient is able to follow commands. Breath sounds are coarse. Trachea is midline, Negative for subcutaneous air, and chest excursion is symmetric. Patient is also Negative for cyanosis and is positivr for pitting edema. All alarms are set and audible. Resuscitation bag is at the head of the bed. Ventilator Settings Mode FIO2 Rate Tidal Volume Pressure PEEP I:E Ratio SIMV, Pressure support, PRVC  50 %   16 450 ml  10 cm H2O  8 cm H20  1:2   
 
Peak airway pressure: 23 cm H2O Minute ventilation: 9.3 l/min Rosibel Mccloud RT

## 2020-01-21 NOTE — PROGRESS NOTES
Bedside shift report received from Riky Montaño RN (offgoing nurse). Report given to Ashok Lion RN. Vital signs stable. No complaints present. Patient in stable condition.

## 2020-01-21 NOTE — PROGRESS NOTES
[facility] [[email or web address]] CARDIOLOGY PROGRESS NOTE 
      
 
1/21/2020 7:02 AM 
 
Admit Date: 1/10/2020 Subjective:  
Patient remains intubated in ICU. Levaphed weaned down to 8 mcg. On Insulin, TPN, Precedex and Argatroban. In sinus rhythm. Still with significant thrombocytopenia. ROS:  UNABLE TO OBTAIN DUE TO INABILITY OF PATIENT TO COMMUNICATE SECONDARY TO CURRENT INTUBATION AND NEED FOR MECHANICAL VENTILATION. Objective:  
  
Vitals:  
 01/21/20 0500 01/21/20 0509 01/21/20 0515 01/21/20 0530 BP:  151/62 Pulse: 68 80 67 64 Resp: 22  16 13 Temp:      
SpO2: 94%  97% 96% Weight:      
Height:      
 
 
Physical Exam: 
General-Intubated. Obese WM in NAD Neck- supple, no JVD 
CV- regular rate and rhythm no MRG Lung- clear bilaterally Abd- soft, nontender, nondistended Ext- 2+ edema bilaterally. Skin- warm and dry Psychiatric:  Unable to accurately assess due to intubated and sedated status. Neurologic:  Unable to accurately assess due to intubated and sedated status. Data Review:  
Labs:  
Recent Labs  
  01/21/20 
0329 01/20/20 
0314  01/18/20 
1510  141   < >  --   
K 3.8 3.2*   < > 3.8 MG 1.6* 1.8   < > 1.8 BUN 8 13   < >  --   
CREA 1.08 1.55*   < >  --   
* 126*   < >  -- WBC 14.6* 11.2*   < >  --   
HGB 8.4* 8.8*   < >  --   
HCT 26.3* 27.2*   < >  --   
PLT 20* 28*   < >  --   
INR  --   --   --  2.9 TRIGL  --  167*  --   --   
 < > = values in this interval not displayed. No results found for: JENNA Stafford TELEMETRY:  Sinus rhythm. Narrow QRS. TERRI (1/13/20): -  Left ventricle: Systolic function was normal. Ejection fraction was estimated to be 55 %. This study was inadequate for the evaluation of regional wall motion.  There was moderate concentric hypertrophy.  
-  Ventricular septum: There was paradoxical motion.  
-  Right ventricle: Systolic function was mildly reduced.  
 -  Left atrium: The atrium was mildly to moderately dilated.  
-  Aortic valve: A bioprosthesis was present. It exhibited normal function. There was no evidence for vegetation.  
-  Mitral valve: There was mild regurgitation.  
-  Tricuspid valve: There was mild regurgitation.  
-  Aorta, systemic arteries: There was mild atheroma.  
-  Pericardium: There was no pericardial effusion. 
  
 
Assessment/Plan:  
 
Principal Problem: 
  CAD (coronary artery disease) (1/10/2020) S/P multivessel CABG. Critically ill post op. Supportive care. Wean pressors as tolerated. Active Problems: Aortic valve stenosis (1/10/2020) S/P AVR. Acute hypoxemic respiratory failure ([address] (1/10/2020) WEan vent per pulmonary. MAy need trach. S/P CABG x 3 (1/10/2020) Noted. S/P AVR (1/10/2020) Normal function on TERRI Acute renal failure (ARF) ([address] (1/11/2020) On dialysis. Atrial fibrillation ([address] (1/13/2020) In sinus on amiodarone. Shock ([address] (1/15/2020) Improving. Wean levaphed Bilateral pneumothorax (1/17/2020) Defer management to primary team.   
 
  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   
 
 
 
 
 
Lauren Acosta MD 
1/21/2020 7:02 AM

## 2020-01-21 NOTE — PROGRESS NOTES
Gastric residual is zero, bowel sounds present in all 4 quadrants. Tube Feedings (Vital AF 1.2) started @ 10 mL/hr with a 10 mL/hr H2O flush as per order.

## 2020-01-21 NOTE — PROGRESS NOTES
Bedside shift report given to Maddy Mendoza RN (oncoming nurse) by Susana Arthur RN (offgoing nurse). Bedside shift report included the following information: SBAR, Kardex, Procedure Summary, MAR, and Recent Results.

## 2020-01-21 NOTE — PROGRESS NOTES
A follow up visit was made to the patient. Emotional support, spiritual presence and  
prayer were provided for the patient. EDMOND Jansen

## 2020-01-21 NOTE — PROGRESS NOTES
SLED run completed. All blood returned with 200 mL NS rinse. Dialysis Tego caps changes, flushed and clamped with Curos cap placed. Patient tolerated well.

## 2020-01-21 NOTE — PROGRESS NOTES
Nutrition F/U: 
TPN Management for Cardiothoracic Surgery. Nutrition Consult for TF Management. Prabhu Whitman MD) Assessment: 
Weight 150.1 kg (CVICU bed 1/21/2020), edema - 4+ pitting generalized and all extremities. The patient remains intubated, ventilated, lightly sedated and NPO. TF remains on hold due to quiet abdomen. . TPN continues to be his sole source of nutrition at this point due to decreased GI activity. Andres Nair RN reports that this morning the patient is much more awake and his bowel sounds are active in all 4 quadrants. Labs are remarkable for sodium 145, potassium 3.8 (up from 3.2 and the patient is being dialyzed with a 4K bath, AM glucose 129 (remains on an insulin drip) and magnesium 1.6. IV Access: 
 Double PICC. Enteral Access: 
 OGT. Macronutrient Needs ( kg and IBW 75.5 kg): EER:  1396-1570 kcal /day (11-14 kcal/kg UBW) 
EPR:  128-151 grams protein/day (1.7-2 grams/kg IBW) - CRRT Max CHO:   435 grams/day (4mg/kgIBW/min - TPN) vs 238 grams/day (50% kcal - TF) Fluid:  4122-0874 ml/d Intake/Comparative Standards: 
Current intake of TPN (1 liter 5% AA/15% DEX with 250 ml 20% lipids) provides 1210 calories/day (81% calorie goal), 50 grams protein/day (39% protein goal), 150 grams CHO/day (does not exceed max CHO limit) and 1330 ml water/day (100% fluid goal). Intervention:  
Meals and Snacks: NPO. Enteral Nutrition: Resume trickle TF of Vital AF 1.2 @ 10 ml/hr with a 10 ml/hr water flush. Do not advance rate. TF goal should be Vital AF 1.2 @ 60 ml/hr with a 10 ml/hr water flush and 2 pouches [employer] daily with a 25 ml water flush before and after protein (if lytes remain wdl) -1808 calories/day (100% calorie goal), 130 grams protein/day (100% protein goal), 160 grams CHO/day (does not exceed max CHO limit) and 1456 ml water/day (>100% fluid goal). Parenteral Nutrition: TPN has been discontinued by Dr. Barbara Goff. Bag will continue to infuse until it empties. Mineral Supplement Therapy: Nutrition Support Orders/Electrolyte Management Replacement Protocols are active on the MAR. 2 gm IV magnesium x 1 today. Coordination of Nutrition Care by a Nutrition Professional: Collaboration with Kenan Zaman RN. Nutrition Discharge Plan: Too soon to determine. Merlin Blum. Rony Burk 
[phone]

## 2020-01-21 NOTE — PROGRESS NOTES
CRRT clotted and no longer circulating. Called and spoke with on call dialysis RN, Joe De La Rosa, who will come re-string machine.

## 2020-01-21 NOTE — PROGRESS NOTES
Ventilator check complete; patient has a #8. 0 ET tube secured at the 24 at the lip. Patient is not sedated. Patient is able to follow commands. Breath sounds are diminished. Trachea is midline, Negative for subcutaneous air, and chest excursion is symmetric. Patient is also Negative for cyanosis and is Negative for pitting edema. All alarms are set and audible. Resuscitation bag is at the head of the bed. Ventilator Settings Mode FIO2 Rate Tidal Volume Pressure PEEP I:E Ratio SIMV  50 %   16 450 ml  10 cm H2O  8 cm H20  1:2   
 
Peak airway pressure: 22.4 cm H2O Minute ventilation: 12.9 l/min [address], RT

## 2020-01-21 NOTE — PROGRESS NOTES
POD 11 Days Post-Op Procedure:  Procedure(s): CORONARY ARTERY BYPASS GRAFT WITH AVR/ (CABG X 3 )/ LIMA VEIN HARVEST/ GREATER SAPHENOUS 
ESOPHAGEAL TRANS ECHOCARDIOGRAM 
 
 
Subjective: On vent but follows commands Objective:  
 
Patient Vitals for the past 8 hrs: 
 BP Temp Pulse Resp SpO2 Weight  
20 0926 147/55  77     
20 0915   72 (!) 0 90 %   
20 0900 146/66  77 17 (!) 87 %   
20 0845   77 10 91 %   
20 0830   74  94 %   
20 0829 139/71  74 26 92 %   
20 0828   73 16 94 %   
20 0815   75  94 %   
20 0801 144/85  80 (!) 39 96 %   
20 0800   80 (!) 52 95 %   
20 0745   83 (!) 37 92 %   
20 0730 127/67  73 27 96 %   
20 0715   68 (!) 31 96 %   
20 0700 123/67  70 (!) 33 98 %   
20 0530   64 13 96 %   
20 0515   67 16 97 %   
20 0509 151/62  80     
20 0500   68 22 94 %   
20 0451 137/63  69 12 93 %   
20 0445   69  (!) 86 %   
20 0430   76  91 %   
20 0415   66 23 93 %   
20 0400 159/75  80 25 92 % 330 lb 14.6 oz (150.1 kg) 20 0345   79 (!) 40 93 %   
20 0330 142/69  75 (!) 34 92 %   
20 0315   71 28 93 %   
20 0307 137/53 97.2 °F (36.2 °C) 69     
20 0300 128/61  68 26 95 %   
20 0245   72 (!) 32 95 %   
20 0230   78 (!) 32 93 %   
20 0229 136/61  78 (!) 33 96 %   
20 0215   66 (!) 0 96 %   
20 0208   69     
20 0200 148/68  67 23 97 %   
20 0145   67 27 96 %  Temp (24hrs), Av.1 °F (36.2 °C), Min:97 °F (36.1 °C), Max:97.2 °F (36.2 °C) Hemodynamics PAP Systolic: 50 PAP 
CO (l/min): 7.5 l/min CO 
CI (l/min/m2): 2.9 l/min/m2 CI No intake/output data recorded.  1901 -  0700 In: 3557.2 [I.V.:3007.2] Out: 4263 [Urine:90] CT Drainage 
  
  
   
  total of all CT's 
 Heart:  regular rate and rhythm, S1, S2 normal, no murmur, click, rub or gallop Lung:  clear to auscultation bilaterally Neuro: Grossly non focal 
Incisions: Clean, dry, and intact Labs: 
Recent Results (from the past 24 hour(s)) GLUCOSE, POC Collection Time: 01/20/20 11:23 AM  
Result Value Ref Range Glucose (POC) 104 (H) 65 - 100 mg/dL GLUCOSE, POC Collection Time: 01/20/20  1:24 PM  
Result Value Ref Range Glucose (POC) 97 65 - 100 mg/dL GLUCOSE, POC Collection Time: 01/20/20  5:24 PM  
Result Value Ref Range Glucose (POC) 84 65 - 100 mg/dL GLUCOSE, POC Collection Time: 01/20/20  7:15 PM  
Result Value Ref Range Glucose (POC) 105 (H) 65 - 100 mg/dL GLUCOSE, POC Collection Time: 01/20/20  8:05 PM  
Result Value Ref Range Glucose (POC) 105 (H) 65 - 100 mg/dL GLUCOSE, POC Collection Time: 01/20/20  9:12 PM  
Result Value Ref Range Glucose (POC) 105 (H) 65 - 100 mg/dL GLUCOSE, POC Collection Time: 01/20/20 10:35 PM  
Result Value Ref Range Glucose (POC) 90 65 - 100 mg/dL GLUCOSE, POC Collection Time: 01/20/20 11:07 PM  
Result Value Ref Range Glucose (POC) 104 (H) 65 - 100 mg/dL GLUCOSE, POC Collection Time: 01/21/20 12:07 AM  
Result Value Ref Range Glucose (POC) 118 (H) 65 - 100 mg/dL GLUCOSE, POC Collection Time: 01/21/20  1:19 AM  
Result Value Ref Range Glucose (POC) 116 (H) 65 - 100 mg/dL GLUCOSE, POC Collection Time: 01/21/20  2:08 AM  
Result Value Ref Range Glucose (POC) 126 (H) 65 - 100 mg/dL GLUCOSE, POC Collection Time: 01/21/20  3:05 AM  
Result Value Ref Range Glucose (POC) 125 (H) 65 - 100 mg/dL METABOLIC PANEL, BASIC Collection Time: 01/21/20  3:29 AM  
Result Value Ref Range Sodium 145 136 - 145 mmol/L Potassium 3.8 3.5 - 5.1 mmol/L Chloride 110 (H) 98 - 107 mmol/L  
 CO2 31 21 - 32 mmol/L Anion gap 4 (L) 7 - 16 mmol/L Glucose 129 (H) 65 - 100 mg/dL  BUN 8 8 - 23 MG/DL  
 Creatinine 1.08 0.8 - 1.5 MG/DL  
 GFR est AA >60 >60 ml/min/1.73m2 GFR est non-AA >60 >60 ml/min/1.73m2 Calcium 7.7 (L) 8.3 - 10.4 MG/DL  
PTT Collection Time: 01/21/20  3:29 AM  
Result Value Ref Range aPTT 57.5 (H) 24.7 - 39.8 SEC  
CBC W/O DIFF Collection Time: 01/21/20  3:29 AM  
Result Value Ref Range WBC 14.6 (H) 4.3 - 11.1 K/uL  
 RBC 2.69 (L) 4.23 - 5.6 M/uL HGB 8.4 (L) 13.6 - 17.2 g/dL HCT 26.3 (L) 41.1 - 50.3 % MCV 97.8 79.6 - 97.8 FL  
 MCH 31.2 26.1 - 32.9 PG  
 MCHC 31.9 31.4 - 35.0 g/dL  
 RDW 14.2 11.9 - 14.6 % PLATELET 20 (LL) 628 - 450 K/uL MPV 12.3 9.4 - 12.3 FL ABSOLUTE NRBC 0.02 0.0 - 0.2 K/uL MAGNESIUM Collection Time: 01/21/20  3:29 AM  
Result Value Ref Range Magnesium 1.6 (L) 1.8 - 2.4 mg/dL POC G3 Collection Time: 01/21/20  4:00 AM  
Result Value Ref Range Device: VENT    
 FIO2 (POC) 50 % pH (POC) 7.453 (H) 7.35 - 7.45    
 pCO2 (POC) 42.7 35 - 45 MMHG  
 pO2 (POC) 76 75 - 100 MMHG  
 HCO3 (POC) 29.9 (H) 22 - 26 MMOL/L  
 sO2 (POC) 96 95 - 98 % Base excess (POC) 5 mmol/L Mode SIMV Tidal volume 450 ml Set Rate 16 bpm  
 Pressure support 10 cmH2O Allens test (POC) NOT APPLICABLE Site DRAWN FROM ARTERIAL LINE Specimen type (POC) ARTERIAL Performed by Micky   
 CO2, POC 31 MMOL/L Exhaled minute volume 14.00 L/min COLLECT TIME 355 GLUCOSE, POC Collection Time: 01/21/20  4:10 AM  
Result Value Ref Range Glucose (POC) 140 (H) 65 - 100 mg/dL GLUCOSE, POC Collection Time: 01/21/20  5:11 AM  
Result Value Ref Range Glucose (POC) 138 (H) 65 - 100 mg/dL GLUCOSE, POC Collection Time: 01/21/20  6:14 AM  
Result Value Ref Range Glucose (POC) 149 (H) 65 - 100 mg/dL GLUCOSE, POC Collection Time: 01/21/20  7:12 AM  
Result Value Ref Range Glucose (POC) 147 (H) 65 - 100 mg/dL GLUCOSE, POC Collection Time: 01/21/20  8:19 AM  
Result Value Ref Range Glucose (POC) 124 (H) 65 - 100 mg/dL GLUCOSE, POC Collection Time: 01/21/20  9:17 AM  
Result Value Ref Range Glucose (POC) 109 (H) 65 - 100 mg/dL Assessment:  
 
Principal Problem: 
  CAD (coronary artery disease) (1/10/2020) Active Problems: Aortic valve stenosis (1/10/2020) Weaning from respirator [facility]) (1/10/2020) Acute hypoxemic respiratory failure ([address] (1/10/2020) S/P CABG x 3 (1/10/2020) S/P AVR (1/10/2020) Acute renal failure (ARF) ([address] (1/11/2020) Atrial fibrillation ([address] (1/13/2020) Shock ([address] (1/15/2020) Bilateral pneumothorax (1/17/2020) Thrombocytopenia ([address] (1/17/2020) Plan/Recommendations/Medical Decision Making:  
 
plts 20K, on CRRT, stop Levo, wean vent See orders

## 2020-01-21 NOTE — PROGRESS NOTES
Bedside verbal received from HAKAN Henson RN. Current drips verifed and hemodynamics reviewed. CRRT currently circulating.

## 2020-01-21 NOTE — PROGRESS NOTES
Critical Care Daily Progress Note: 1/21/2020 Admission Date: 1/10/2020 The patient's chart is reviewed and the patient is discussed with the staff. Patient had CABG x 3 and AVR . Slow to improve. Had small ptx that self resolved. 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 Subjective:  
2 L off ovenight on dialysisi-crrt- 
+ for hit Current Facility-Administered Medications Medication Dose Route Frequency  TPN ADULT-CENTRAL - dextrose 15% amino acid 5%   IntraVENous QPM  
 amiodarone (CORDARONE) 450 mg in dextrose 5% 250 mL infusion  0.5-1 mg/min IntraVENous TITRATE  dexmedeTOMidine (PRECEDEX) 400 mcg in 0.9% sodium chloride 100 mL infusion  0.2-0.7 mcg/kg/hr IntraVENous TITRATE  amiodarone (CORDARONE) tablet 400 mg  400 mg Per NG tube Q12H  
 fat emulsion 20% (LIPOSYN, INTRAlipid) infusion 250 mL  250 mL IntraVENous QPM  
 0.9% sodium chloride infusion 250 mL  250 mL IntraVENous PRN  
 epoetin maritza-epbx (RETACRIT) 14,000 Units combo injection  14,000 Units SubCUTAneous Q7D  
 argatroban 50 mg in 0.9% sodium chloride 50 mL (1000 mcg/mL) infusion  0.5-10 mcg/kg/min IntraVENous TITRATE  alcohol 62% (NOZIN) nasal  1 Ampule  1 Ampule Topical Q12H  
 NUTRITIONAL SUPPORT ELECTROLYTE PRN ORDERS   Does Not Apply PRN  
 NOREPINephrine (LEVOPHED) 16,000 mcg in dextrose 5% 250 mL infusion  0.05-0.2 mcg/kg/min IntraVENous TITRATE  
 0.9% sodium chloride infusion 250 mL  250 mL IntraVENous PRN  
 albuterol (PROVENTIL VENTOLIN) nebulizer solution 2.5 mg  2.5 mg Nebulization QID PRN  
 fentaNYL in normal saline (pf) 25 mcg/mL infusion  0-200 mcg/hr IntraVENous TITRATE  midazolam (VERSED) 100 mg in 0.9% sodium chloride 100 mL infusion  0-10 mg/hr IntraVENous TITRATE  acetaminophen (TYLENOL) solution 650 mg  650 mg Per NG tube Q4H PRN  
 0.9% sodium chloride infusion  25 mL/hr IntraVENous CONTINUOUS  
 sodium chloride (NS) flush 5-40 mL  5-40 mL IntraVENous Q8H  
 sodium chloride (NS) flush 5-40 mL  5-40 mL IntraVENous PRN  
 oxyCODONE-acetaminophen (PERCOCET) 5-325 mg per tablet 1 Tab  1 Tab Oral Q4H PRN  
 morphine 10 mg/ml injection 3 mg  3 mg IntraVENous Q1H PRN  
 naloxone (NARCAN) injection 0.4 mg  0.4 mg IntraVENous PRN  
 EPINEPHrine (ADRENALIN) 4 mg in 0.9% sodium chloride 250 mL infusion  0.04-0.1 mcg/kg/min IntraVENous TITRATE  [Held by provider] metoprolol tartrate (LOPRESSOR) tablet 25 mg  25 mg Oral Q12H  
 atorvastatin (LIPITOR) tablet 80 mg  80 mg Oral QHS  insulin regular (NOVOLIN R, HUMULIN R) 100 Units in 0.9% sodium chloride 100 mL infusion  1 Units/hr IntraVENous TITRATE  dextrose (D50W) injection syrg 12.5 g  25 mL IntraVENous PRN  
 [Held by provider] aspirin chewable tablet 81 mg  81 mg Oral DAILY  magnesium sulfate 1 g/100 ml IVPB (premix or compounded)  1 g IntraVENous PRN  
 midazolam (VERSED) injection 1 mg  1 mg IntraVENous Q1H PRN  chlorhexidine (PERIDEX) 0.12 % mouthwash 10 mL  10 mL Oral BID  morphine 10 mg/ml injection 4 mg  4 mg IntraVENous Q1H PRN Or  
 morphine 10 mg/ml injection 5 mg  5 mg IntraVENous Q1H PRN  
 dextrose 5 % - 0.45% NaCl infusion  25 mL/hr IntraVENous CONTINUOUS  
 vasopressin (VASOSTRICT) 20 Units in 0.9% sodium chloride 100 mL infusion  0-0.1 Units/min IntraVENous TITRATE  PHENYLephrine (PF)(LEWIS-SYNEPHRINE) 30 mg in 0.9% sodium chloride 250 mL infusion   mcg/min IntraVENous TITRATE  sodium bicarbonate (8.4%) injection 50 mEq  50 mEq IntraVENous PRN Review of Systems Unobtainable due to patient status. Objective:  
 
Vitals:  
 01/21/20 0500 01/21/20 0509 01/21/20 0515 01/21/20 0530 BP:  151/62 Pulse: 68 80 67 64 Resp: 22  16 13 Temp:      
 SpO2: 94%  97% 96% Weight:      
Height:      
 
 
 
Intake/Output Summary (Last 24 hours) at 1/21/2020 1895 Last data filed at 1/21/2020 5272 Gross per 24 hour Intake 2100.3 ml Output 3931 ml Net -1830.7 ml Physical Exam:         
Constitutional:  intubated and mechanically ventilated. EENMT:  Sclera clear, pupils equal, oral mucosa moist 
Respiratory:  clear Cardiovascular:  RRR with no M,G,R; 
Gastrointestinal:  soft with no tenderness; positive bowel sounds present Musculoskeletal:  warm with no cyanosis, 2+ lower extremity edema Skin:  no jaundice or ecchymosis Neurologic: no gross neuro deficits Psychiatric:  Nods some to quesions LINES:   
ETT 
 
DRIPS:   
Argatraban, levophed, insulin, tpn, precedex CXR:   
 
 
Ventilator Settings Mode FIO2 Rate Tidal Volume Pressure PEEP  
SIMV, Pressure support, PRVC  50 %    450 ml  10 cm H2O  8 cm H20 Peak airway pressure: 19.4 cm H2O Minute ventilation: 14.2 l/min ABG:  
Recent Labs  
  01/21/20 
0400 01/20/20 
0404 01/19/20 
0258 PHI 7.453* 7.429 7.402 PCO2I 42.7 46.3* 47.5*  
PO2I 76 110* 144* HCO3I 29.9* 30.7* 29.5* LAB Recent Labs  
  01/21/20 
3775 01/21/20 
4546 01/21/20 
0410 01/21/20 
0305 01/21/20 
7994 GLUCPOC 149* 138* 140* 125* 126* Recent Labs  
  01/21/20 
0329 01/20/20 
0314 01/19/20 
0308  01/18/20 
1510 WBC 14.6* 11.2* 10.6  --   --   
HGB 8.4* 8.8* 8.6*   < >  --   
HCT 26.3* 27.2* 26.8*   < >  --   
PLT 20* 28* 19*  --   --   
INR  --   --   --   --  2.9  
 < > = values in this interval not displayed. Recent Labs  
  01/21/20 
0329 01/20/20 
0314 01/19/20 
0308  141 140  
K 3.8 3.2* 3.9 * 104 104 CO2 31 30 29 * 126* 174* BUN 8 13 13 CREA 1.08 1.55* 1.35  
MG 1.6* 1.8 1.7* PHOS  --  2.4  --   
CA 7.7* 7.6* 7.7* No results for input(s): LCAD, LAC in the last 72 hours. Assessment:  (Medical Decision Making) Hospital Problems  Date Reviewed: 12/6/2019 Codes Class Noted POA Bilateral pneumothorax ICD-10-CM: J93.9 ICD-9-CM: 512.89  1/17/2020 Unknown Thrombocytopenia [address] ICD-10-CM: D69.6 ICD-9-CM: 287.5  1/17/2020 Unknown Shock ([facility] ICD-10-CM: R57.9 ICD-9-CM: 785.50  1/15/2020 Unknown Atrial fibrillation [facility]) ICD-10-CM: I48.91 
ICD-9-CM: 427.31  1/13/2020 Unknown Acute renal failure (ARF) (H[facility]) ICD-10-CM: N17.9 ICD-9-CM: 584.9  1/11/2020 Unknown  
 crrt Aortic valve stenosis ICD-10-CM: I35.0 ICD-9-CM: 424.1  1/10/2020 Unknown * (Principal) CAD (coronary artery disease) ICD-10-CM: I25.10 ICD-9-CM: 414.00  1/10/2020 Unknown Weaning from respirator [facility]) ICD-10-CM: Z99.11 ICD-9-CM: V46.13  1/10/2020 Unknown [facility] ICD-10-CM: J96.01 
ICD-9-CM: 518.81  1/10/2020 Vent support S/P CABG x 3 ICD-10-CM: Z95.1 ICD-9-CM: V45.81  1/10/2020 Unknown S/P AVR ICD-10-CM: Z95.2 ICD-9-CM: V43.3  1/10/2020 Unknown Plan:  (Medical Decision Making) 1    Vent support  - try pressure support 2    Wean levophed as tolerated- keep map 65 
3   argatraban for hit 
4   Consider trach in next few days if no progress with vent weaning 
-- 
 
More than 50% of the time documented was spent in face-to-face contact with the patient and in the care of the patient on the floor/unit where the patient is located.  
 
Ruth Abrams MD

## 2020-01-22 NOTE — PROGRESS NOTES
Discussed with CV surgery. Pt still with intermittant symptomatic cristina and 2:1 heart block. Needs PPM. Plan for Friday after trach done. He is complicated and certainly has elevated risks with HIT and low plts and H/H but appears we are in our limited window to do this.

## 2020-01-22 NOTE — PROGRESS NOTES
Ventilator check complete; patient has a #8. 0 ET tube secured at the 25 at the lip. Patient is not able to follow commands. Breath sounds are coarse. Trachea is midline, Negative for subcutaneous air, and chest excursion is symmetric. Patient is also Negative for cyanosis and is Negative for pitting edema. All alarms are set and audible. Resuscitation bag is at the head of the bed. Ventilator Settings Mode FIO2 Rate Tidal Volume Pressure PEEP I:E Ratio Pressure control  50 %   15  10 cm H2O  10 cm H20 1:2   
 
Peak airway pressure: 25.2 cm H2O Minute ventilation: 7.9 l/min ABG: No results for input(s): PH, PCO2, PO2, HCO3 in the last 72 hours.  
 
 
Steffi Alvarez, RT

## 2020-01-22 NOTE — PROGRESS NOTES
Patient had worsening tachypnea over the past couple hours. No improvement with attempts at sedation. He had been weaned on PEEP from 14 to 8 over the weekend and SIMV over night. He was tachypneic to 40s/50s on my evaluation and volumes were 300s with minute ventilation of 15. No wheezing on exam. PS and PRVC did not improve and PRVC resulted in worsened tachypnea and Pk pressures. Changed to [facility] and he immediately improved with RR in low to mid 20s. Stat CXR showed no worsening and actually looked fairly less edematous, but still had low lung volumes. Will continue with PC, increase PEEP to 12 and PC 22 for peak pressures <35. He seems to be tolerating this well with Vt 500-600 and RR low 20s. Will continue to monitor. Additional interventions will probably required deeper sedation. He appears he will be a slow wean and require tracheostomy. 32 minutes bedside critical care time spent with patient.   
 
Vera Reyes MD

## 2020-01-22 NOTE — PROGRESS NOTES
Sudden onset of tachypnea, RR-44-50, RT called and adjustments to vent, medications given. Patient continues to follow simple commands but unable to slow down respirations. VS stable and as documented.

## 2020-01-22 NOTE — PROGRESS NOTES
Ventilator check complete; patient has a #8. 0 ET tube secured at the 25 at the lip. Patient is sedated. Patient is able to follow commands. Breath sounds are coarse and diminished. Trachea is midline, Negative for subcutaneous air, and chest excursion is symmetric. Patient is also Negative for cyanosis and is Positive for pitting edema. All alarms are set and audible. Resuscitation bag is at the head of the bed. Ventilator Settings Mode FIO2 Rate Tidal Volume Pressure PEEP I:E Ratio Pressure control  50 %   15  10 cm H2O  12 cm H20  1:2   
 
Peak airway pressure: 34.8 cm H2O Minute ventilation: 13.1 l/min Stoney Muller, RT

## 2020-01-22 NOTE — PROGRESS NOTES
Bedside shift report received from Stefano Henriquez RN (offgoing nurse). Report given to Benjamín Hill RN. Vital signs stable. No complaints present. Patient in stable condition.

## 2020-01-22 NOTE — PROGRESS NOTES
Nutrition Follow up: TF management per nutritional support protocols. ( Dr Julio Amos) Assessment:  
Food/Nutrition History: Remains on vent and TF dependent via OGT. Levophed was stopped yesterday and pt was off all pressors. However Levophed was resumed this morning prior to start of CRRT d/t hypotension/MAP < 65. Therefore would not advance TF. Abdomen distended (may be his baseline d/t obesity)with hypoactive bowel sounds. Residuals  < 20 ml. Date of Last BM: 1-22 Pertinent Medications: Pepcid, Insulin gtt ( received 97 units yesterday), Flagyl, Levophed. IVF: NS at 25 ml/hr Pertinent labs: POC Glucoses:   mg/dl. Hx of DM with 42 units of Lantus bid and 40 units Humalog with evening meal 
Anthropometrics:Height: 5' 10\" (177.8 cm), Weight Source: Bed, Weight: 149.6 kg (329 lb 12.9 oz) Last 3 Recorded Weights in this Encounter 01/20/20 0547 01/21/20 0400 01/22/20 7482 Weight: 150.1 kg (330 lb 14.6 oz) 150.1 kg (330 lb 14.6 oz) 149.6 kg (329 lb 12.9 oz) Edema: 4+ generalized, BUE and BLE Pre-op standing scale weight: 136.6 kg UBW range per EMR: 300-306# Macronutrient needs:(using UBW (Usual body weight) 136.4 kg and IBW 75.5 kg) EER:  8110-4310 kcal /day (11-14 kcal/kg UBW) 
EPR:  128-151 grams protein/day (1.7-2 grams/kg IBW)-for CRRT Max CHO:  238 grams/day (50% kcal) Fluid:  1000-1500ml/d Intake/Comparative standards: Current TF: Vital 1.2 AF at 10 ml/hr with 10ml water q 1hr to provide 288 kcal/day (19% of needs), 18 grams protein/day (14% of needs), 26 grams CHO/day (does not exceed max CHO),  and ~ 440ml free water/day (~44% of needs) Intervention: 
Meals and snacks: NPO 
EN: Continue trickle TF. Nutrition Supplement Therapy: Electrolyte replacement per nutritional support protocols are active on MAR. Labs: Has active order for daily BMP and Mg. Add Phos MWF. IV: IVF per MD. 
Coordination of nutrition care: Discussed with Donna Zuñiga RN 
 
 Latonia Odonnell, [facility], [facility], [address] [place] [address]

## 2020-01-22 NOTE — PROGRESS NOTES
[facility] CARDIOLOGY PROGRESS NOTE 
      
 
1/22/2020 7:02 AM 
 
Admit Date: 1/10/2020 Subjective:  
Patient remains intubated in ICU. Levaphed restarted today. On Insulin, TPN, Precedex and Argatroban. In sinus rhythm. Still with significant thrombocytopenia. HIT positive. Getting SLED 
 
 
ROS:  UNABLE TO OBTAIN DUE TO INABILITY OF PATIENT TO COMMUNICATE SECONDARY TO CURRENT INTUBATION AND NEED FOR MECHANICAL VENTILATION. Objective:  
  
Vitals:  
 01/22/20 9160 01/22/20 0845 01/22/20 0900 01/22/20 9998 BP:    111/57 Pulse: 67 70 84 74 Resp: 15 16 (!) 35 Temp:      
SpO2: 100% 100% 100% Weight:      
Height:      
 
 
Physical Exam: 
General-Intubated. Obese WM in NAD Neck- supple, no JVD 
CV- regular rate and rhythm no MRG Lung- clear bilaterally Abd- soft, nontender, nondistended Ext- 2+ edema bilaterally. Skin- warm and dry Psychiatric:  Unable to accurately assess due to intubated and sedated status. Neurologic:  Unable to accurately assess due to intubated and sedated status. Data Review:  
Labs:  
Recent Labs  
  01/22/20 
0544 01/22/20 
0257 01/21/20 
0329 01/20/20 
7201 NA  --  145 145 141 K  --  4.1 3.8 3.2*  
MG  --  1.9 1.6* 1.8 BUN  --  20 8 13 CREA  --  2.29* 1.08 1.55* GLU  --  64* 129* 126* WBC  --  11.7* 14.6* 11.2* HGB 7.4* 7.4* 8.4* 8.8* HCT 23.7* 23.2* 26.3* 27.2*  
PLT  --  45* 20* 28* TRIGL  --   --   --  167* No results found for: JENNA Escobedo TELEMETRY:  Sinus rhythm. Narrow QRS. TERRI (1/13/20): -  Left ventricle: Systolic function was normal. Ejection fraction was estimated to be 55 %. This study was inadequate for the evaluation of regional wall motion.  There was moderate concentric hypertrophy.  
-  Ventricular septum: There was paradoxical motion.  
-  Right ventricle: Systolic function was mildly reduced.  
-  Left atrium: The atrium was mildly to moderately dilated.  
 -  Aortic valve: A bioprosthesis was present. It exhibited normal function. There was no evidence for vegetation.  
-  Mitral valve: There was mild regurgitation.  
-  Tricuspid valve: There was mild regurgitation.  
-  Aorta, systemic arteries: There was mild atheroma.  
-  Pericardium: There was no pericardial effusion. 
  
 
Assessment/Plan:  
 
Principal Problem: 
  CAD (coronary artery disease) (1/10/2020) S/P multivessel CABG. Critically ill post op. Supportive care. Wean pressors as tolerated. Active Problems: Aortic valve stenosis (1/10/2020) S/P AVR. Acute hypoxemic respiratory failure ([address] (1/10/2020) WEan vent per pulmonary. MAy need trach. S/P CABG x 3 (1/10/2020) Noted. S/P AVR (1/10/2020) Normal function on TERRI Acute renal failure (ARF) ([address] (1/11/2020) On dialysis. Atrial fibrillation ([address] (1/13/2020) In sinus on amiodarone. Shock ([address] (1/15/2020) Improving. Wean levaphed Bilateral pneumothorax (1/17/2020) Defer management to primary team.   
 
  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   
 
 
 
 
 
Christopher Keys MD 
1/22/2020 7:02 AM

## 2020-01-22 NOTE — DIALYSIS
72 HR SLED initiated using  Left CVC. Aspirated and flushed both catheter ports without problem. Machine settings per MD order. 200  DFR  200 UFR with 4K citrasate. Heparin 0 unit bolus and 0 ml/hr. Reported to primary nurse. Dialysis nurse available as needed.

## 2020-01-22 NOTE — PROGRESS NOTES
Bedside shift report given to Valentino Keepers (oncoming nurse) by Mary Young RN (offgoing nurse). Bedside shift report included the following information: SBAR, Kardex, Procedure Summary, MAR, and Recent Results.

## 2020-01-22 NOTE — PROGRESS NOTES
A follow up visit was made to the patient. Emotional support, spiritual presence and  
prayer were provided for the patient. He was not alert. EDMOND Singh

## 2020-01-22 NOTE — PROGRESS NOTES
Dr. Aden Duane on unit, asked to evaluate patient as RR-46 and now having frequent PVC's (15-20 per minute).

## 2020-01-22 NOTE — PROGRESS NOTES
Critical Care Daily Progress Note: 1/22/2020 Admission Date: 1/10/2020 The patient's chart is reviewed and the patient is discussed with the staff. Patient had CABG x 3 and AVR . Slow to improve. Had small ptx that self resolved. 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 Subjective:  
1.1L off with dialysis, +682 net yesterday 
+ for hit, confirmed with SHARON on Argatroban Had worsening tachypnea last night and needed to be placed on PC for larger volumes. PEEP increased back to 12. Was breath stacking with RR 45-55 on SIMV, PSV and PRVC. Current Facility-Administered Medications Medication Dose Route Frequency  0.9% sodium chloride infusion 250 mL  250 mL IntraVENous PRN  
 famotidine (PEPCID) 40 mg/5 mL (8 mg/mL) oral suspension 20 mg  20 mg Per NG tube DAILY  amiodarone (CORDARONE) 450 mg in dextrose 5% 250 mL infusion  0.5-1 mg/min IntraVENous TITRATE  dexmedeTOMidine (PRECEDEX) 400 mcg in 0.9% sodium chloride 100 mL infusion  0.2-0.7 mcg/kg/hr IntraVENous TITRATE  amiodarone (CORDARONE) tablet 400 mg  400 mg Per NG tube Q12H  
 0.9% sodium chloride infusion 250 mL  250 mL IntraVENous PRN  
 epoetin maritza-epbx (RETACRIT) 14,000 Units combo injection  14,000 Units SubCUTAneous Q7D  
 argatroban 50 mg in 0.9% sodium chloride 50 mL (1000 mcg/mL) infusion  0.5-10 mcg/kg/min IntraVENous TITRATE  alcohol 62% (NOZIN) nasal  1 Ampule  1 Ampule Topical Q12H  
 NUTRITIONAL SUPPORT ELECTROLYTE PRN ORDERS   Does Not Apply PRN  
 NOREPINephrine (LEVOPHED) 16,000 mcg in dextrose 5% 250 mL infusion  0.05-0.2 mcg/kg/min IntraVENous TITRATE  
 0.9% sodium chloride infusion 250 mL  250 mL IntraVENous PRN  
 albuterol (PROVENTIL VENTOLIN) nebulizer solution 2.5 mg  2.5 mg Nebulization QID PRN  
  fentaNYL in normal saline (pf) 25 mcg/mL infusion  0-200 mcg/hr IntraVENous TITRATE  midazolam (VERSED) 100 mg in 0.9% sodium chloride 100 mL infusion  0-10 mg/hr IntraVENous TITRATE  acetaminophen (TYLENOL) solution 650 mg  650 mg Per NG tube Q4H PRN  
 0.9% sodium chloride infusion  25 mL/hr IntraVENous CONTINUOUS  
 sodium chloride (NS) flush 5-40 mL  5-40 mL IntraVENous Q8H  
 sodium chloride (NS) flush 5-40 mL  5-40 mL IntraVENous PRN  
 oxyCODONE-acetaminophen (PERCOCET) 5-325 mg per tablet 1 Tab  1 Tab Oral Q4H PRN  
 morphine 10 mg/ml injection 3 mg  3 mg IntraVENous Q1H PRN  
 naloxone (NARCAN) injection 0.4 mg  0.4 mg IntraVENous PRN  
 EPINEPHrine (ADRENALIN) 4 mg in 0.9% sodium chloride 250 mL infusion  0.04-0.1 mcg/kg/min IntraVENous TITRATE  [Held by provider] metoprolol tartrate (LOPRESSOR) tablet 25 mg  25 mg Oral Q12H  
 atorvastatin (LIPITOR) tablet 80 mg  80 mg Oral QHS  insulin regular (NOVOLIN R, HUMULIN R) 100 Units in 0.9% sodium chloride 100 mL infusion  1 Units/hr IntraVENous TITRATE  dextrose (D50W) injection syrg 12.5 g  25 mL IntraVENous PRN  
 [Held by provider] aspirin chewable tablet 81 mg  81 mg Oral DAILY  magnesium sulfate 1 g/100 ml IVPB (premix or compounded)  1 g IntraVENous PRN  
 midazolam (VERSED) injection 1 mg  1 mg IntraVENous Q1H PRN  chlorhexidine (PERIDEX) 0.12 % mouthwash 10 mL  10 mL Oral BID  morphine 10 mg/ml injection 4 mg  4 mg IntraVENous Q1H PRN Or  
 morphine 10 mg/ml injection 5 mg  5 mg IntraVENous Q1H PRN  
 dextrose 5 % - 0.45% NaCl infusion  25 mL/hr IntraVENous CONTINUOUS  
 vasopressin (VASOSTRICT) 20 Units in 0.9% sodium chloride 100 mL infusion  0-0.1 Units/min IntraVENous TITRATE  PHENYLephrine (PF)(LEWIS-SYNEPHRINE) 30 mg in 0.9% sodium chloride 250 mL infusion   mcg/min IntraVENous TITRATE  sodium bicarbonate (8.4%) injection 50 mEq  50 mEq IntraVENous PRN Review of Systems Unobtainable due to patient status. Objective:  
 
Vitals:  
 01/22/20 0528 01/22/20 0530 01/22/20 0545 01/22/20 0600 BP:  (!) 87/54  93/51 Pulse:  81 79 81 Resp:  27 14 18 Temp:      
SpO2:  100% 100% 100% Weight: 329 lb 12.9 oz (149.6 kg) Height: 5' 10\" (1.778 m) Intake/Output Summary (Last 24 hours) at 1/22/2020 2416 Last data filed at 1/22/2020 0600 Gross per 24 hour Intake 1939.39 ml Output 807 ml Net 1132.39 ml Physical Exam:         
Constitutional:  intubated and mechanically ventilated. EENMT:  Sclera clear, pupils equal, oral mucosa moist 
Respiratory:  clear Cardiovascular:  RRR with no M,G,R; 
Gastrointestinal:  soft with no tenderness; positive bowel sounds present Musculoskeletal:  warm with no cyanosis, 2+ lower extremity edema Skin:  no jaundice or ecchymosis Neurologic: no gross neuro deficits Psychiatric:  Nods some to quesions LINES:   
ETT 
 
DRIPS:   
Argatraban, levophed, insulin, tpn, precedex CXR:  1/21 (PM): no infiltrate or collapse Ventilator Settings Mode FIO2 Rate Tidal Volume Pressure PEEP Pressure control  50 %    450 ml  10 cm H2O  12 cm H20 Peak airway pressure: 30.2 cm H2O Minute ventilation: 9.7 l/min ABG:  
Recent Labs  
  01/22/20 
0503 01/21/20 
1759 01/21/20 
0400 PHI 7.544* 7.480* 7.453* PCO2I 30.3* 36.4 42.7 PO2I 73* 68* 76 HCO3I 26.2* 27.1* 29.9*  
  
LAB Recent Labs  
  01/22/20 
0730 01/22/20 
0545 01/22/20 
7261 01/22/20 
0357 01/22/20 
2495 GLUCPOC 149* 144* 130* 93 65 Recent Labs  
  01/22/20 
0544 01/22/20 
0257 01/21/20 
0329 01/20/20 
4606 WBC  --  11.7* 14.6* 11.2* HGB 7.4* 7.4* 8.4* 8.8* HCT 23.7* 23.2* 26.3* 27.2*  
PLT  --  45* 20* 28* Recent Labs  
  01/22/20 
0257 01/21/20 
0329 01/20/20 
2530  145 141  
K 4.1 3.8 3.2*  
* 110* 104 CO2 29 31 30 GLU 64* 129* 126* BUN 20 8 13 CREA 2.29* 1.08 1.55* MG 1.9 1.6* 1.8 PHOS  --   --  2.4 CA 7.9* 7.7* 7.6* No results for input(s): LCAD, LAC in the last 72 hours. Assessment:  (Medical Decision Making) Hospital Problems  Date Reviewed: 12/6/2019 Codes Class Noted POA Bilateral pneumothorax ICD-10-CM: J93.9 ICD-9-CM: 512.89  1/17/2020 Unknown Thrombocytopenia ([facility] ICD-10-CM: D69.6 ICD-9-CM: 287.5  1/17/2020 Unknown Shock [facility] ICD-10-CM: R57.9 ICD-9-CM: 785.50  1/15/2020 Unknown Atrial fibrillation [facility]) ICD-10-CM: I48.91 
ICD-9-CM: 427.31  1/13/2020 Unknown Acute renal failure (ARF) (H[facility]) ICD-10-CM: N17.9 ICD-9-CM: 584.9  1/11/2020 Unknown  
 crrt Aortic valve stenosis ICD-10-CM: I35.0 ICD-9-CM: 424.1  1/10/2020 Unknown * (Principal) CAD (coronary artery disease) ICD-10-CM: I25.10 ICD-9-CM: 414.00  1/10/2020 Unknown Weaning from respirator [facility]) ICD-10-CM: Z99.11 ICD-9-CM: V46.13  1/10/2020 Unknown Acute hypoxemic respiratory failure ([facility] ICD-10-CM: J96.01 
ICD-9-CM: 518.81  1/10/2020 Vent support S/P CABG x 3 ICD-10-CM: Z95.1 ICD-9-CM: V45.81  1/10/2020 Unknown S/P AVR ICD-10-CM: Z95.2 ICD-9-CM: V43.3  1/10/2020 Unknown Plan:  (Medical Decision Making) 1    Vent support  - will decrease PC 15 over 12 (volumes down to 550 from 640) 2    Levophed as needed- keep map 65-currently off, but BP soft when asleep 3    Argatraban for hit 
4    Consider trach in next few days if no progress with vent weaning 5    Transfuse 2 units today per CV 
6    CRRT today 
7    Try to wean sedation -- 
 
More than 50% of the time documented was spent in face-to-face contact with the patient and in the care of the patient on the floor/unit where the patient is located.  
 
Tim Foote MD

## 2020-01-22 NOTE — PROGRESS NOTES
Renal Progress Note Admission Date: 1/10/2020 Subjective: Intubated and sedated. Objective:  
 
Physical Exam:   
Patient Vitals for the past 8 hrs: 
 BP Temp Pulse Resp SpO2 Height Weight  
01/22/20 0600 93/51  81 18 100 %    
01/22/20 0545   79 14 100 %    
01/22/20 0530 (!) 87/54  81 27 100 %    
01/22/20 0528      5' 10\" (1.778 m) 149.6 kg (329 lb 12.9 oz) 01/22/20 0515   77 (!) 0 100 %    
01/22/20 0505   76 15 98 %    
01/22/20 0500   79 12 100 %    
01/22/20 0459 95/51  79 14 100 %    
01/22/20 0445   80 10 99 %    
01/22/20 0430 90/52  78 11 97 %    
01/22/20 0415   80 10 97 %    
01/22/20 0400 99/56  80 21 97 %    
01/22/20 0345   70 15 95 %    
01/22/20 0330   76 15 94 %    
01/22/20 0329 91/51  80 15 94 %    
01/22/20 0315   89 16 95 %    
01/22/20 0300 (!) 88/51 98 °F (36.7 °C) 81 16 95 %    
01/22/20 0245   81      
01/22/20 0230   88      
01/22/20 0215   87 9 93 %    
01/22/20 0200 (!) 88/52  84 18 93 %    
01/22/20 0145   83  95 %    
01/22/20 0130   87 20 97 %    
01/22/20 0115   86 13 95 %    
01/22/20 0100 99/55  87 10 95 %    
01/22/20 0045   92 13 93 %    
01/22/20 0030   93 19 94 %   Gen: sedated , HEENT: moist membranes CV: S1, S2 
Lungs: Coarse bilaterally Extem: 2+ edema Current Facility-Administered Medications Medication Dose Route Frequency  0.9% sodium chloride infusion 250 mL  250 mL IntraVENous PRN  
 famotidine (PEPCID) 40 mg/5 mL (8 mg/mL) oral suspension 20 mg  20 mg Per NG tube DAILY  amiodarone (CORDARONE) 450 mg in dextrose 5% 250 mL infusion  0.5-1 mg/min IntraVENous TITRATE  dexmedeTOMidine (PRECEDEX) 400 mcg in 0.9% sodium chloride 100 mL infusion  0.2-0.7 mcg/kg/hr IntraVENous TITRATE  amiodarone (CORDARONE) tablet 400 mg  400 mg Per NG tube Q12H  
 0.9% sodium chloride infusion 250 mL  250 mL IntraVENous PRN  
  epoetin maritza-epbx (RETACRIT) 14,000 Units combo injection  14,000 Units SubCUTAneous Q7D  
 argatroban 50 mg in 0.9% sodium chloride 50 mL (1000 mcg/mL) infusion  0.5-10 mcg/kg/min IntraVENous TITRATE  alcohol 62% (NOZIN) nasal  1 Ampule  1 Ampule Topical Q12H  
 NUTRITIONAL SUPPORT ELECTROLYTE PRN ORDERS   Does Not Apply PRN  
 NOREPINephrine (LEVOPHED) 16,000 mcg in dextrose 5% 250 mL infusion  0.05-0.2 mcg/kg/min IntraVENous TITRATE  
 0.9% sodium chloride infusion 250 mL  250 mL IntraVENous PRN  
 albuterol (PROVENTIL VENTOLIN) nebulizer solution 2.5 mg  2.5 mg Nebulization QID PRN  
 fentaNYL in normal saline (pf) 25 mcg/mL infusion  0-200 mcg/hr IntraVENous TITRATE  midazolam (VERSED) 100 mg in 0.9% sodium chloride 100 mL infusion  0-10 mg/hr IntraVENous TITRATE  acetaminophen (TYLENOL) solution 650 mg  650 mg Per NG tube Q4H PRN  
 0.9% sodium chloride infusion  25 mL/hr IntraVENous CONTINUOUS  
 sodium chloride (NS) flush 5-40 mL  5-40 mL IntraVENous Q8H  
 sodium chloride (NS) flush 5-40 mL  5-40 mL IntraVENous PRN  
 oxyCODONE-acetaminophen (PERCOCET) 5-325 mg per tablet 1 Tab  1 Tab Oral Q4H PRN  
 morphine 10 mg/ml injection 3 mg  3 mg IntraVENous Q1H PRN  
 naloxone (NARCAN) injection 0.4 mg  0.4 mg IntraVENous PRN  
 EPINEPHrine (ADRENALIN) 4 mg in 0.9% sodium chloride 250 mL infusion  0.04-0.1 mcg/kg/min IntraVENous TITRATE  [Held by provider] metoprolol tartrate (LOPRESSOR) tablet 25 mg  25 mg Oral Q12H  
 atorvastatin (LIPITOR) tablet 80 mg  80 mg Oral QHS  insulin regular (NOVOLIN R, HUMULIN R) 100 Units in 0.9% sodium chloride 100 mL infusion  1 Units/hr IntraVENous TITRATE  dextrose (D50W) injection syrg 12.5 g  25 mL IntraVENous PRN  
 [Held by provider] aspirin chewable tablet 81 mg  81 mg Oral DAILY  magnesium sulfate 1 g/100 ml IVPB (premix or compounded)  1 g IntraVENous PRN  
 midazolam (VERSED) injection 1 mg  1 mg IntraVENous Q1H PRN  
  chlorhexidine (PERIDEX) 0.12 % mouthwash 10 mL  10 mL Oral BID  morphine 10 mg/ml injection 4 mg  4 mg IntraVENous Q1H PRN Or  
 morphine 10 mg/ml injection 5 mg  5 mg IntraVENous Q1H PRN  
 dextrose 5 % - 0.45% NaCl infusion  25 mL/hr IntraVENous CONTINUOUS  
 vasopressin (VASOSTRICT) 20 Units in 0.9% sodium chloride 100 mL infusion  0-0.1 Units/min IntraVENous TITRATE  PHENYLephrine (PF)(LEWIS-SYNEPHRINE) 30 mg in 0.9% sodium chloride 250 mL infusion   mcg/min IntraVENous TITRATE  sodium bicarbonate (8.4%) injection 50 mEq  50 mEq IntraVENous PRN Data Review:  
 
LABS:  
Recent Results (from the past 12 hour(s)) GLUCOSE, POC Collection Time: 01/21/20  9:16 PM  
Result Value Ref Range Glucose (POC) 125 (H) 65 - 100 mg/dL GLUCOSE, POC Collection Time: 01/21/20 10:11 PM  
Result Value Ref Range Glucose (POC) 118 (H) 65 - 100 mg/dL GLUCOSE, POC Collection Time: 01/21/20 11:00 PM  
Result Value Ref Range Glucose (POC) 104 (H) 65 - 100 mg/dL GLUCOSE, POC Collection Time: 01/22/20 12:08 AM  
Result Value Ref Range Glucose (POC) 90 65 - 100 mg/dL GLUCOSE, POC Collection Time: 01/22/20 12:58 AM  
Result Value Ref Range Glucose (POC) 81 65 - 100 mg/dL GLUCOSE, POC Collection Time: 01/22/20  2:53 AM  
Result Value Ref Range Glucose (POC) 64 (L) 65 - 100 mg/dL MAGNESIUM Collection Time: 01/22/20  2:57 AM  
Result Value Ref Range Magnesium 1.9 1.8 - 2.4 mg/dL METABOLIC PANEL, BASIC Collection Time: 01/22/20  2:57 AM  
Result Value Ref Range Sodium 145 136 - 145 mmol/L Potassium 4.1 3.5 - 5.1 mmol/L Chloride 111 (H) 98 - 107 mmol/L  
 CO2 29 21 - 32 mmol/L Anion gap 5 (L) 7 - 16 mmol/L Glucose 64 (L) 65 - 100 mg/dL BUN 20 8 - 23 MG/DL Creatinine 2.29 (H) 0.8 - 1.5 MG/DL  
 GFR est AA 36 (L) >60 ml/min/1.73m2 GFR est non-AA 30 (L) >60 ml/min/1.73m2 Calcium 7.9 (L) 8.3 - 10.4 MG/DL  
PTT Collection Time: 01/22/20  2:57 AM  
Result Value Ref Range aPTT 65.4 (H) 24.7 - 39.8 SEC  
CBC W/O DIFF Collection Time: 01/22/20  2:57 AM  
Result Value Ref Range WBC 11.7 (H) 4.3 - 11.1 K/uL  
 RBC 2.37 (L) 4.23 - 5.6 M/uL HGB 7.4 (L) 13.6 - 17.2 g/dL HCT 23.2 (L) 41.1 - 50.3 % MCV 97.9 (H) 79.6 - 97.8 FL  
 MCH 31.2 26.1 - 32.9 PG  
 MCHC 31.9 31.4 - 35.0 g/dL  
 RDW 14.3 11.9 - 14.6 % PLATELET 45 (L) 778 - 450 K/uL MPV 11.8 9.4 - 12.3 FL ABSOLUTE NRBC 0.02 0.0 - 0.2 K/uL GLUCOSE, POC Collection Time: 01/22/20  3:33 AM  
Result Value Ref Range Glucose (POC) 65 65 - 100 mg/dL GLUCOSE, POC Collection Time: 01/22/20  3:57 AM  
Result Value Ref Range Glucose (POC) 93 65 - 100 mg/dL POC G3 Collection Time: 01/22/20  5:03 AM  
Result Value Ref Range Device: VENT    
 FIO2 (POC) 50 % pH (POC) 7.544 (H) 7.35 - 7.45    
 pCO2 (POC) 30.3 (L) 35 - 45 MMHG  
 pO2 (POC) 73 (L) 75 - 100 MMHG  
 HCO3 (POC) 26.2 (H) 22 - 26 MMOL/L  
 sO2 (POC) 96 95 - 98 % Base excess (POC) 4 mmol/L Mode ASSIST CONTROL Set Rate 15 bpm  
 PEEP/CPAP (POC) 12 cmH2O Allens test (POC) NOT APPLICABLE Site DRAWN FROM ARTERIAL LINE Specimen type (POC) ARTERIAL Performed by Micky   
 CO2, POC 27 MMOL/L Pressure control 24 Exhaled minute volume 13.00 L/min COLLECT TIME 455 GLUCOSE, POC Collection Time: 01/22/20  5:04 AM  
Result Value Ref Range Glucose (POC) 130 (H) 65 - 100 mg/dL HGB & HCT Collection Time: 01/22/20  5:44 AM  
Result Value Ref Range HGB 7.4 (L) 13.6 - 17.2 g/dL HCT 23.7 (L) 41.1 - 50.3 % GLUCOSE, POC Collection Time: 01/22/20  5:45 AM  
Result Value Ref Range Glucose (POC) 144 (H) 65 - 100 mg/dL GLUCOSE, POC Collection Time: 01/22/20  7:30 AM  
Result Value Ref Range Glucose (POC) 149 (H) 65 - 100 mg/dL Plan:  
 
Principal Problem: 
  CAD (coronary artery disease) (1/10/2020) Active Problems: Aortic valve stenosis (1/10/2020) Weaning from respirator [facility]) (1/10/2020) Acute hypoxemic respiratory failure ([address] (1/10/2020) S/P CABG x 3 (1/10/2020) S/P AVR (1/10/2020) Acute renal failure (ARF) ([address] (1/11/2020) Atrial fibrillation ([address] (1/13/2020) Shock ([address] (1/15/2020) Bilateral pneumothorax (1/17/2020) Thrombocytopenia ([address] (1/17/2020) A on CKD - Renal US unremarkable. 
  
ASHD/ AS - s/p CABG  Aortic Valve replacement 
  
DM 
  
HTN/ Volume - UF as tolerated 
  
Resp Failure - Hypoxic Resp failure from pulmonary edema on vent. 
  
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 
 
Still volume issues. Will continue CRRT. Extend time for more fluid removal. If clots off middle of the night, will plan restart in AM. Addendum: 
The patient was seen on dialysis at 10:48 AM . Catheter is functioning well. BP has been labile. Correlating with HR some.  On low dose levophed for now

## 2020-01-22 NOTE — PROGRESS NOTES
[address] Hematology Oncology Inpatient Hematology / Oncology Progress Note Admission Date: 1/10/2020  4:59 AM 
Reason for Admission/Hospital Course: Atherosclerosis of native coronary artery of native heart with unstable angina pectoris ([facility] [address] [I25.110] Nonrheumatic aortic valve stenosis [I35.0] CAD (coronary artery disease) [I25.10] Aortic valve stenosis [I35.0] 24 Hour Events: 
Remains intubated Critical condition Continues to require pressor support SHARON +ve Plt up to 45k On Argatroban ROS: unable to determine due to intubation Allergies Allergen Reactions  Heparin Other (comments) HIT antibody test strongly positive on 1/17/2020. SHARON drawn 1/18/2020 and resulted positive on 1/21/2020  Amoxicillin Rash  Keflex [Cephalexin] Rash  Pcn [Penicillins] Other (comments) \"felt closed in\". No rash OBJECTIVE: 
Patient Vitals for the past 8 hrs: 
 BP Temp Pulse Resp SpO2 Height Weight  
01/22/20 1030   67 19 100 %    
01/22/20 1015   72 23 98 %    
01/22/20 1000 94/55  87 25 99 %    
01/22/20 0945   84 (!) 34 100 %    
01/22/20 0930   72 23 100 %    
01/22/20 0923 111/57  74      
01/22/20 0915   70 16 100 %    
01/22/20 0900   84 (!) 35 100 %    
01/22/20 0845   70 16 100 %    
01/22/20 0834   67 15 100 %    
01/22/20 0830   68 15 100 %    
01/22/20 0815   70 (!) 7 99 %    
01/22/20 0800 (!) 77/45  77 13 98 %    
01/22/20 0745   82 18 100 %    
01/22/20 0730 92/55  64 11 100 %    
01/22/20 0715   86 13 100 %    
01/22/20 0700 108/57 98.4 °F (36.9 °C) 72 17 100 %    
01/22/20 0645   89 29 98 %    
01/22/20 0600 93/51  81 18 100 %    
01/22/20 0545   79 14 100 %    
01/22/20 0530 (!) 87/54  81 27 100 %    
01/22/20 0528      5' 10\" (1.778 m) 329 lb 12.9 oz (149.6 kg) 01/22/20 0515   77 (!) 0 100 %    
01/22/20 0505   76 15 98 %   20 0500   79 12 100 %    
20 0459 95/51  79 14 100 %    
20 0445   80 10 99 %    
20 0430 90/52  78 11 97 %    
20 0415   80 10 97 %    
20 0400 99/56  80 21 97 %    
20 0345   70 15 95 %    
20 0330   76 15 94 %    
20 0329 91/51  80 15 94 %   Temp (24hrs), Av.5 °F (36.9 °C), Min:98 °F (36.7 °C), Max:99.2 °F (37.3 °C) 
 
 07 -  1900 In: 300 Out: 80 Physical Exam: 
Constitutional: Acutely ill-appearing elderly male in no acute distress, lying comfortably in the hospital bed. HEENT: Normocephalic and atraumatic. Neck supple without JVD. No thyromegaly present. Skin Warm and dry. No bruising and no rash noted. No erythema. No pallor. Respiratory Lungs are coarse to auscultation bilaterally. On vent. CVS Mildly bradycardic rate, irregular rhythm and normal S1 and S2. No murmurs, gallops, or rubs. Abdomen Obese. Soft, and nondistended, normoactive bowel sounds. No palpable mass. No hepatosplenomegaly. Neuro Sedated on vent MSK 1-2+ BLE pitting edema. Dusky toes. Psych Sedated on vent Labs: 
   
Recent Labs  
  20 
0544 20 
0257 20 
0329 20 
6450 WBC  --  11.7* 14.6* 11.2*  
RBC  --  2.37* 2.69* 2.79* HGB 7.4* 7.4* 8.4* 8.8* HCT 23.7* 23.2* 26.3* 27.2*  
MCV  --  97.9* 97.8 97.5 MCH  --  31.2 31.2 31.5 MCHC  --  31.9 31.9 32.4 RDW  --  14.3 14.2 14.2 PLT  --  45* 20* 28* Recent Labs  
  20 
0257 20 
0329 20 
1755  145 141  
K 4.1 3.8 3.2*  
* 110* 104 CO2 29 31 30 AGAP 5* 4* 7 GLU 64* 129* 126* BUN 20 8 13 CREA 2.29* 1.08 1.55* GFRAA 36* >60 57* GFRNA 30* >60 47* CA 7.9* 7.7* 7.6*  
MG 1.9 1.6* 1.8 PHOS  --   --  2.4 Imaging: XR CHEST SNGL V [[id number]] Collected: 20 0528 Order Status: Completed Updated: 20 6735 Narrative:    
EXAM: XR CHEST SNGL V 
 
 INDICATION: Coronary artery disease COMPARISON: 1/18/2020 FINDINGS: A portable AP radiograph of the chest was obtained at 0334 hours. The 
patient is on a cardiac monitor. Bibasilar airspace disease. Worse in the 
interval. The cardiac and mediastinal contours and pulmonary vascularity are 
normal.  The bones and soft tissues are grossly within normal limits. Impression:    
IMPRESSION: Findings most consistent with pulmonary edema worse in the interval.  
XR CHEST PORT [[id number]] Collected: 01/18/20 0502 Order Status: Completed Updated: 01/18/20 7839 Narrative:    
EXAM: XR CHEST PORT INDICATION: Coronary artery disease COMPARISON: 1/17/2020 FINDINGS: A portable AP radiograph of the chest was obtained at 0500 hours. The 
patient is on a cardiac monitor. Bibasilar airspace disease improved. The 
cardiac and mediastinal contours and pulmonary vascularity are normal.  The 
bones and soft tissues are grossly within normal limits. Impression:    
IMPRESSION: Bibasilar atelectasis improved. XR CHEST SNGL V [[id number]] Collected: 01/17/20 1009 Order Status: Completed Updated: 01/17/20 1024 Narrative:    
CHEST X-RAY, one view. HISTORY:  Pneumothorax status post open heart surgery, follow-up. TECHNIQUE:  AP upright portable view COMPARISON: Yesterday's exam 
 
FINDINGS:    
Small right apical pneumothorax is even smaller. Increased atelectasis or 
infiltrate at both bases. ET tube dialysis type catheter introducer sheath and 
sternal wires remain. Impression:    
IMPRESSION:  Decreased pneumothorax. Increased atelectasis or infiltrate at both 
bases. XR CHEST SNGL V [[id number]] Order Status: Canceled XR CHEST SNGL V [[id number]] Collected: 01/16/20 0427 Order Status: Completed Updated: 01/16/20 5014 Narrative:    
Clinical history: Post chest tube removal. 
 
TECHNIQUE: AP portable chest 
 
COMPARISON: Yesterday.  
 
FINDINGS: 
 
 Previous left-sided chest tube is no longer seen. Endotracheal and enteric tubes 
and left-sided subclavian catheter are stable. Right [place]-Nallely catheter has been 
removed. Right IJ sheath is still present. Postsurgical changes of sternotomy. There is vascular congestion. Bibasilar opacities, likely atelectasis. There is 
suggestion of small right apical pneumothorax. There is tiny left apical 
pneumothorax. Impression:    
IMPRESSION: 
 
1. Previous left chest tube is no longer seen. Tiny right apical pneumothorax. 2. Suggestion of small right apical pneumothorax. 3. Bibasilar atelectasis and vascular congestion, similar to prior. XR CHEST SNGL V [[id number]] Collected: 01/15/20 9782 Order Status: Completed Updated: 01/15/20 5473 Narrative:    
 Portable view of the chest  
 
COMPARISON: Yesterday. CLINICAL HISTORY: Postop. FINDINGS: 
 
Stable postsurgical changes of sternotomy. Endotracheal tube, right Hornbeak-Nallely 
catheter and left chest tube are stable. Enteric tube is not well-seen. Bibasilar opacities, likely atelectasis. There is vascular congestion. No 
pneumothorax. Cardiomediastinal contour and the surrounding bones are stable. Impression:    
IMPRESSION: 
 
1. Stable post op findings. No significant change in the appearance of the 
lungs. No pneumothorax. XR CHEST SNGL V [[id number]] Collected: 01/14/20 0610 Order Status: Completed Updated: 01/14/20 5593 Narrative:    
 Portable view of the chest  
 
COMPARISON: Yesterday. CLINICAL HISTORY: Postop. FINDINGS: 
 
Stable postsurgical changes of sternotomy. Endotracheal tube, enteric tube, 
right-sided Horn[place]-Nallely catheter and left chest tube are stable. There is stable 
left subclavian catheter. Cardiac mediastinal contour and the surrounding bones 
are stable. No pneumothorax. There is vascular congestion. Bibasilar opacities, 
likely atelectasis.  
  
Impression:    
IMPRESSION: 
 
 1. Stable post op findings. ET tube tip is in good position. 2. Vascular congestion and bibasilar atelectasis, similar to prior exam.  
XR CHEST SNGL V [[id number]] Collected: 01/13/20 0650 Order Status: Completed Updated: 01/13/20 1545 Narrative:    
 Portable view of the chest  
 
COMPARISON: Yesterday CLINICAL HISTORY: Postop, follow-up. FINDINGS: 
 
Endotracheal tube, enteric tube, right-sided Valentines-Nallely catheter, and left chest 
tube are stable. Cardiac silhouette is enlarged, similar to prior exam. 
Bibasilar opacities, likely atelectasis. There is vascular congestion. No 
pneumothorax. Left-sided subclavian catheter is stable. Impression:    
IMPRESSION: 
 
1. Stable post op findings. ET tube tip is in good position. 2. Bibasilar atelectasis and vascular congestion. No pneumothorax. 3. No significant change compared to prior exam.  
XR CHEST SNGL V [[id number]] Collected: 01/12/20 5994 Order Status: Completed Updated: 01/12/20 4182 Narrative:    
 Portable view of the chest  
 
COMPARISON: January 12, 2020. CLINICAL HISTORY: Subclavian line placement FINDINGS: 
 
There is a new left subclavian catheter with the tip in the area of SVC/right 
atrium junction. Right Valentines-Nallely catheter, enteric tube and endotracheal tube 
and left-sided chest tube are stable. There is vascular congestion. Bibasilar 
opacities, likely atelectasis. No significant pneumothorax. Cardiomediastinal 
contour and the surrounding bones are stable. Stable postsurgical changes of 
sternotomy. Impression:    
IMPRESSION: 
 
1. New left subclavian catheter. No significant pneumothorax. 2. Vascular congestion and bibasilar atelectasis. XR CHEST SNGL V [[id number]] Collected: 01/12/20 0301 Order Status: Completed Updated: 01/12/20 0292 Narrative:    
 Portable view of the chest  
 
Clinical history: Worsening hypercapnia, on vent COMPARISON: January 11, 2020.  
 
FINDINGS: 
 
 Stable postsurgical changes of sternotomy. Endotracheal tube, enteric tube, 
right-sided Walker-Nallely catheter and left-sided chest tube are stable. There is 
small right apical pneumothorax. There is vascular congestion. Bibasilar 
opacities, likely atelectasis. Cardiomediastinal contour and the surrounding 
bones are stable. Impression:    
IMPRESSION: 
 
1. Small right apical pneumothorax, appearing slightly decreased since the prior 
exam. 
 
2. Vascular congestion and bibasilar atelectasis. XR CHEST SNGL V [[id number]] Order Status: Canceled XR CHEST SNGL V [[id number]] Collected: 01/11/20 1220 Order Status: Completed Updated: 01/11/20 1231 Narrative:    
PORTABLE CHEST, January 11, 2020 at 1200 hours CLINICAL HISTORY:  Follow-up right pneumothorax. COMPARISON:  0359 hours today. FINDINGS:  AP erect image demonstrates persistent small right apical 
pneumothorax.  The heart remains enlarged with pulmonary venous congestion and 
bibasilar atelectasis status post CABG.  Endotracheal, feeding, and left chest 
tubes as well as Walker-Nallely catheter remain in place. Impression:    
IMPRESSION:  NO SIGNIFICANT INTERVAL CHANGE, INCLUDING THE SMALL RIGHT APICAL PNEUMOTHORAX. [address] [place] [address] [[id number]] Collected: 01/11/20 1122 Order Status: Completed Updated: 01/11/20 1127 Narrative:    
ULTRASOUND OF THE KIDNEYS AND BLADDER 
 
CLINICAL HISTORY:  Acute on chronic kidney disease. COMPARISON:  None. FINDINGS: The examination was limited by the patient's morbid obesity as well as 
limited mobility and ability to suspend respirations.  Multiple images from real 
time ultrasound evaluation of the kidneys show that they are normal in size and 
orientation bilaterally.  The right kidney measures 12.3 cm in length while the 
left measures 12.4 cm.  No definite solid renal mass, hydronephrosis, stone, or 
abnormal perinephric collection is seen.  The bladder was not distended at the 
 time of examination. Impression:    
IMPRESSION:  Unremarkable, technically limited examination. XR CHEST SNGL V [[id number]] Collected: 01/11/20 0636 Order Status: Completed Updated: 01/11/20 4622 Narrative:    
 Portable view of the chest  
 
COMPARISON: January 10, 2020 CLINICAL HISTORY: Postop. FINDINGS: 
 
Stable postsurgical changes of sternotomy. Endotracheal tube, enteric tube and 
right-sided [place]-Nallely catheter and left-sided chest tube are stable. There is 
vascular congestion. Bibasilar opacities, likely atelectasis. There is small 
right apical pneumothorax, similar to prior exam. Cardiac mediastinal contour 
and the surrounding bones are stable. Impression:    
IMPRESSION: 
 
1. Stable small right apical pneumothorax. 2. Vascular congestion and bibasilar atelectasis. No change in the appearance of 
the lungs. XR CHEST SNGL V [[id number]] Collected: 01/10/20 1617 Order Status: Completed Updated: 01/10/20 1623 Narrative:    
Chest, one view, 1/10/2020 Indication:Postop cardiovascular surgery Comparison:1/8/2020 There are post median sternotomy changes. Support apparatus including [place]-Nallely 
catheter tip projects over the proximal right pulmonary artery. Endotracheal 
tube about 6 cm above the radha. There is an NG tube tip of which is not 
included on this exam. There is also left-sided chest tube. No pneumothorax. Patient appears to have had an aortic valve f replacement. There is perihilar 
atelectasis and likely a small left effusion. Impression:    
Impression: Recent median sternotomy changes with support apparatus as detailed 
above. Perihilar and likely a small left effusion. ASSESSMENT: 
 
Problem List  Date Reviewed: 12/6/2019 Codes Class Noted Bilateral pneumothorax ICD-10-CM: J93.9 ICD-9-CM: 512.89  1/17/2020 Thrombocytopenia ([facility] [address] ICD-10-CM: D69.6 ICD-9-CM: 287.5  1/17/2020 Shock ([facility] [address] ICD-10-CM: R57.9 ICD-9-CM: 785.50  1/15/2020 Atrial fibrillation [facility] [place] [facility]) ICD-10-CM: I48.91 
ICD-9-CM: 427.31  1/13/2020 Acute renal failure (ARF) (HCC) ICD-10-CM: N17.9 ICD-9-CM: 584.9  1/11/2020 Aortic valve stenosis ICD-10-CM: I35.0 ICD-9-CM: 424.1  1/10/2020 * (Principal) CAD (coronary artery disease) ICD-10-CM: I25.10 ICD-9-CM: 414.00  1/10/2020 Weaning from respirator [facility] [place] [facility]) ICD-10-CM: Z99.11 ICD-9-CM: V46.13  1/10/2020 Acute hypoxemic respiratory failure ([facility] ICD-10-CM: J96.01 
ICD-9-CM: 518.81  1/10/2020 S/P CABG x 3 ICD-10-CM: Z95.1 ICD-9-CM: V45.81  1/10/2020 S/P AVR ICD-10-CM: Z95.2 ICD-9-CM: V43.3  1/10/2020 Bilateral carotid artery stenosis ICD-10-CM: I65.23 ICD-9-CM: 433.10, 433.30  11/24/2019 Overview Signed 11/24/2019  8:06 PM by Savannah Carreon MD  
  1. Carotid Duplex (11/20/19): Bilateral 50-69% ICA stenosis. Type 2 diabetes with nephropathy ([facility] ICD-10-CM: E11.21 
ICD-9-CM: 250.40, 583.81  8/26/2019 Obesity, morbid ([facility] ICD-10-CM: E66.01 
ICD-9-CM: 278.01  10/19/2018 Nonrheumatic aortic valve stenosis ICD-10-CM: I35.0 ICD-9-CM: 424.1  4/13/2018 Overview Addendum 9/18/2019  6:16 PM by Savannah Carreon MD  
  1. Echo (10/15/18): EF 55-60%. Moderate to severe Aortic Stenosis. MG 31.  PG 52.  DI 0.25. 
2.  Echo (9/11/19):  EF 60-65%. Mild LAE. SEvere AS. MG 40. PG 61. DI=0.23. Mild to moderate MR. DM type 2 (diabetes mellitus, type 2) (HCC) ICD-10-CM: E11.9 ICD-9-CM: 250.00  1/14/2013 Gout ICD-10-CM: M10.9 ICD-9-CM: 274.9  1/14/2013 HTN (hypertension) ICD-10-CM: I10 
ICD-9-CM: 401.9  1/14/2013 BPH (benign prostatic hyperplasia) ICD-10-CM: N40.0 ICD-9-CM: 600.00  1/14/2013 Seasonal allergic rhinitis ICD-10-CM: J30.2 ICD-9-CM: 477.9  1/14/2013 HLD (hyperlipidemia) ICD-10-CM: E78.5 ICD-9-CM: 272.4  1/14/2013 Mr. Corina Navarrete is a 69 y.o. male admitted on 1/10/2020. His PMH includes DM, thyroid disease, prior h/o CAD with recent [facility] 12/31/19 showing severe multivessel coronary artery disease in the setting of known severe aortic stenosis. Patient was seen by CTS and set up for surgery on 1/10/2020. Patient underwent CABG x3 (LIMA>LAD, SVG>OM, SVG>PDA) and aortic valve replacement with a 25-mm Intuity Jon-Gutierres valve. He became hypoxic, hypotensive and hard to ventilate. Pulmonary seeing patient and he remains intubated and sedated. He is also on pressors. Nephrology saw patient for worsening renal failure and CRRT was started on 1/12/2020. The patient went into AFib on 1/12 and was started on IV amiodarone. He was started on Azactam/Vanc for sepsis. CXR with increased atelectasis or infiltrate at both bases. BCx-NGTD. UCx-NG. Sputum Cx with light normal resp yessenia. On admission, WBC 16.9, Hgb 9.0, Plt 98k. On 1/17, WBC 9.3/ANC 7.6, Hgb 8.1, Plt 15k. Today platelets up to 24,256 without intervention. HIT/SHARON pending. Primary team changed heparin to argatroban. We were consulted for thrombocytopenia. 
  
PLAN: 
Thrombocytopenia / DVTs / HIT +ve - Plt 98k on admission, down to 15k 
- marrow suppression from sepsis/shock likely contributing 
- HIT pending - Check DIC labs, hemolysis labs, and peripheral smear - Primary team has changed heparin to Argatroban - Transfuse plt prn 
01/18 Platelets have rebounded on their own, without transfusion to 33k today. DIC labs not impressive - repeat today for completeness. Smear and haptoglobin pending, follow. HIT/SHARON pending. As soon as HIT returns as negative, may stop argatroban as platelets are so low.  His worsening thrombocytopenia is likely multifactorial related to recent clinical course, sepsis with transient marrow suppression and increased consumption, along with exposure to various medications including amiodarone.  Expect his platelets to improve over time as his clinical condition improves.  In interim he should be transfused to keep his platelets over 46,508. NFZQ improving. 01/21 HIT positive, await SHARON for confirmation. Platelets down to 36L today. Positive upper and lower extremity DVT's. Continue argatroban. No platelets unless bleeding. 1/22 SHARON positive. Plt up to 45k. Con't Argatroban. 
  
Anemia - Transfuse prn to keep Hgb >8 
- DEJUAN on CRRT / marrow suppression from sepsis/shock - Tsat 17%, ferritin 557. Check folate, B12, hemolysis labs 01/19 Continue to follow haptoglobin and smear. 1/21 Hgb stable, 8.4. No hemolysis noted. No schistocytes seen on smear. 
  
DEJUAN 
- Neph following, on CRRT 
  
Sepsis/Shock 
- on pressor support - On Azactam/Vanc - Cultures NGTD 
1/21 No longer on antibx. Cultures neg. Continues on pressor support. 
  
Hypoxia  
- intubated on vent - Pulm following 1/21 remains intubated on vent 
  
Afib - Cards managing 
  
CAD 
- s/p CABG 
  
Thank you for allowing us to participate in the care of Mr. Luther Gil. We will sign off; please do not hesitate to call with any questions. Simmie Fleischer,  [address] Hematology Oncology [address] [place] [address] Office : [phone] Fax : [phone] Attending Addendum: 
I have personally performed a face to face diagnostic evaluation on this patient. I have reviewed and agree with the care plan as documented by Simmie Fleischer, N.P. My findings are as follows:  He has LILIBETH, appears intubated, heart rate regular without murmurs, abdomen is non-tender, bowel sounds are positive, we will sign off, continue Argatroban as per protocol. MD Polo Celaya Hematology/Oncology [address] [place] [address] Office : [phone] Fax : [phone]

## 2020-01-22 NOTE — PROGRESS NOTES
POD 12 Days Post-Op Procedure:  Procedure(s): CORONARY ARTERY BYPASS GRAFT WITH AVR/ (CABG X 3 )/ LIMA VEIN HARVEST/ GREATER SAPHENOUS 
ESOPHAGEAL TRANS ECHOCARDIOGRAM 
 
 
Subjective: On vent but follows commands Objective:  
 
Patient Vitals for the past 8 hrs: 
 BP Temp Pulse Resp SpO2 Height Weight  
20 0600 93/51  81 18 100 %    
20 0545   79 14 100 %    
20 0530 (!) 87/54  81 27 100 %    
20 0528      5' 10\" (1.778 m) 329 lb 12.9 oz (149.6 kg) 20 0515   77 (!) 0 100 %    
20 0505   76 15 98 %    
20 0500   79 12 100 %    
20 0459 95/51  79 14 100 %    
20 0445   80 10 99 %    
20 0430 90/52  78 11 97 %    
20 0415   80 10 97 %    
20 0400 99/56  80 21 97 %    
20 0345   70 15 95 %    
20 0330   76 15 94 %    
20 0329 91/51  80 15 94 %    
20 0315   89 16 95 %    
20 0300 (!) 88/51 98 °F (36.7 °C) 81 16 95 %    
20 0245   81      
20 0230   88      
20 0215   87 9 93 %    
20 0200 (!) 88/52  84 18 93 %    
20 0145   83  95 %    
20 0130   87 20 97 %    
20 0115   86 13 95 %    
20 0100 99/55  87 10 95 %    
20 0045   92 13 93 %    
20 0030   93 19 94 %    
20 0015   91 17 94 %    
20 2345   75 15 94 %    
20 2330   91 15 (!) 88 %   Temp (24hrs), Av.3 °F (36.8 °C), Min:97.4 °F (36.3 °C), Max:99.2 °F (37.3 °C) Hemodynamics PAP Systolic: 50 PAP 
CO (l/min): 7.5 l/min CO 
CI (l/min/m2): 2.9 l/min/m2 CI No intake/output data recorded.  1901 -  0700 In: 0291 [I.V.:2573] Out: 4232 [Urine:150] CT Drainage 
  
  
   
  total of all CT's Heart:  regular rate and rhythm, S1, S2 normal, no murmur, click, rub or gallop Lung:  clear to auscultation bilaterally Neuro: Grossly non focal 
Incisions: Clean, dry, and intact Labs: 
Recent Results (from the past 24 hour(s)) GLUCOSE, POC Collection Time: 01/21/20  8:19 AM  
Result Value Ref Range Glucose (POC) 124 (H) 65 - 100 mg/dL GLUCOSE, POC Collection Time: 01/21/20  9:17 AM  
Result Value Ref Range Glucose (POC) 109 (H) 65 - 100 mg/dL GLUCOSE, POC Collection Time: 01/21/20 10:12 AM  
Result Value Ref Range Glucose (POC) 92 65 - 100 mg/dL GLUCOSE, POC Collection Time: 01/21/20 11:07 AM  
Result Value Ref Range Glucose (POC) 94 65 - 100 mg/dL GLUCOSE, POC Collection Time: 01/21/20 12:06 PM  
Result Value Ref Range Glucose (POC) 86 65 - 100 mg/dL GLUCOSE, POC Collection Time: 01/21/20  2:50 PM  
Result Value Ref Range Glucose (POC) 69 65 - 100 mg/dL GLUCOSE, POC Collection Time: 01/21/20  3:31 PM  
Result Value Ref Range Glucose (POC) 78 65 - 100 mg/dL GLUCOSE, POC Collection Time: 01/21/20  4:58 PM  
Result Value Ref Range Glucose (POC) 125 (H) 65 - 100 mg/dL GLUCOSE, POC Collection Time: 01/21/20  5:55 PM  
Result Value Ref Range Glucose (POC) 148 (H) 65 - 100 mg/dL POC G3 Collection Time: 01/21/20  5:59 PM  
Result Value Ref Range Device: VENT    
 FIO2 (POC) 50 % pH (POC) 7.480 (H) 7.35 - 7.45    
 pCO2 (POC) 36.4 35 - 45 MMHG  
 pO2 (POC) 68 (L) 75 - 100 MMHG  
 HCO3 (POC) 27.1 (H) 22 - 26 MMOL/L  
 sO2 (POC) 95 95 - 98 % Base excess (POC) 3 mmol/L Mode SIMV Tidal volume 450 ml Set Rate 16 bpm  
 PEEP/CPAP (POC) 8 cmH2O Pressure support 19 cmH2O Allens test (POC) NOT APPLICABLE Site DRAWN FROM ARTERIAL LINE Specimen type (POC) ARTERIAL Performed by Ellie   
 CO2, POC 28 MMOL/L Respiratory comment: NurseNotified Exhaled minute volume 14.40 L/min COLLECT TIME 1,801 GLUCOSE, POC Collection Time: 01/21/20  7:33 PM  
Result Value Ref Range Glucose (POC) 159 (H) 65 - 100 mg/dL GLUCOSE, POC Collection Time: 01/21/20  9:16 PM  
Result Value Ref Range Glucose (POC) 125 (H) 65 - 100 mg/dL GLUCOSE, POC Collection Time: 01/21/20 10:11 PM  
Result Value Ref Range Glucose (POC) 118 (H) 65 - 100 mg/dL GLUCOSE, POC Collection Time: 01/21/20 11:00 PM  
Result Value Ref Range Glucose (POC) 104 (H) 65 - 100 mg/dL GLUCOSE, POC Collection Time: 01/22/20 12:08 AM  
Result Value Ref Range Glucose (POC) 90 65 - 100 mg/dL GLUCOSE, POC Collection Time: 01/22/20 12:58 AM  
Result Value Ref Range Glucose (POC) 81 65 - 100 mg/dL GLUCOSE, POC Collection Time: 01/22/20  2:53 AM  
Result Value Ref Range Glucose (POC) 64 (L) 65 - 100 mg/dL MAGNESIUM Collection Time: 01/22/20  2:57 AM  
Result Value Ref Range Magnesium 1.9 1.8 - 2.4 mg/dL METABOLIC PANEL, BASIC Collection Time: 01/22/20  2:57 AM  
Result Value Ref Range Sodium 145 136 - 145 mmol/L Potassium 4.1 3.5 - 5.1 mmol/L Chloride 111 (H) 98 - 107 mmol/L  
 CO2 29 21 - 32 mmol/L Anion gap 5 (L) 7 - 16 mmol/L Glucose 64 (L) 65 - 100 mg/dL BUN 20 8 - 23 MG/DL Creatinine 2.29 (H) 0.8 - 1.5 MG/DL  
 GFR est AA 36 (L) >60 ml/min/1.73m2 GFR est non-AA 30 (L) >60 ml/min/1.73m2 Calcium 7.9 (L) 8.3 - 10.4 MG/DL  
PTT Collection Time: 01/22/20  2:57 AM  
Result Value Ref Range aPTT 65.4 (H) 24.7 - 39.8 SEC  
CBC W/O DIFF Collection Time: 01/22/20  2:57 AM  
Result Value Ref Range WBC 11.7 (H) 4.3 - 11.1 K/uL  
 RBC 2.37 (L) 4.23 - 5.6 M/uL HGB 7.4 (L) 13.6 - 17.2 g/dL HCT 23.2 (L) 41.1 - 50.3 % MCV 97.9 (H) 79.6 - 97.8 FL  
 MCH 31.2 26.1 - 32.9 PG  
 MCHC 31.9 31.4 - 35.0 g/dL  
 RDW 14.3 11.9 - 14.6 % PLATELET 45 (L) 466 - 450 K/uL MPV 11.8 9.4 - 12.3 FL ABSOLUTE NRBC 0.02 0.0 - 0.2 K/uL GLUCOSE, POC Collection Time: 01/22/20  3:33 AM  
Result Value Ref Range Glucose (POC) 65 65 - 100 mg/dL GLUCOSE, POC  
 Collection Time: 01/22/20  3:57 AM  
Result Value Ref Range Glucose (POC) 93 65 - 100 mg/dL POC G3 Collection Time: 01/22/20  5:03 AM  
Result Value Ref Range Device: VENT    
 FIO2 (POC) 50 % pH (POC) 7.544 (H) 7.35 - 7.45    
 pCO2 (POC) 30.3 (L) 35 - 45 MMHG  
 pO2 (POC) 73 (L) 75 - 100 MMHG  
 HCO3 (POC) 26.2 (H) 22 - 26 MMOL/L  
 sO2 (POC) 96 95 - 98 % Base excess (POC) 4 mmol/L Mode ASSIST CONTROL Set Rate 15 bpm  
 PEEP/CPAP (POC) 12 cmH2O Allens test (POC) NOT APPLICABLE Site DRAWN FROM ARTERIAL LINE Specimen type (POC) ARTERIAL Performed by Micky   
 CO2, POC 27 MMOL/L Pressure control 24 Exhaled minute volume 13.00 L/min COLLECT TIME 455 GLUCOSE, POC Collection Time: 01/22/20  5:04 AM  
Result Value Ref Range Glucose (POC) 130 (H) 65 - 100 mg/dL HGB & HCT Collection Time: 01/22/20  5:44 AM  
Result Value Ref Range HGB 7.4 (L) 13.6 - 17.2 g/dL HCT 23.7 (L) 41.1 - 50.3 % GLUCOSE, POC Collection Time: 01/22/20  5:45 AM  
Result Value Ref Range Glucose (POC) 144 (H) 65 - 100 mg/dL Assessment:  
 
Principal Problem: 
  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  
 
Hct 23 this am, transfuse to 30, wean vent, hopefully extubate soon See orders

## 2020-01-23 PROBLEM — D75.829 HIT (HEPARIN-INDUCED THROMBOCYTOPENIA): Status: ACTIVE | Noted: 2020-01-01

## 2020-01-23 NOTE — PROGRESS NOTES
Dr. Tom Toth at bedside. Updated about patient status. Observed patient HR paced @ 90. Decreased pacemaker to 40 - patient in SR. Patient then transitioned into complete heart block while Dr. Tom Toth at bedside. Orders received to increase dopamine gtt to 5 mcg and keep backup rate on pacemaker at 60.

## 2020-01-23 NOTE — PROGRESS NOTES
CRRT clotted off. Blood rinsed back without problems. Patient supposed to run for 72 hours. Report from off going RN to not restring if clots off and will be reassessed in the morning. monitoring

## 2020-01-23 NOTE — PROGRESS NOTES
Ventilator check complete; patient has a #8. 0 ET tube secured at the 25 at the lip. Patient is sedated. Patient is able to follow commands. Breath sounds are coarse. Trachea is midline, Negative for subcutaneous air, and chest excursion is symmetric. Patient is also Negative for cyanosis and is Positive for pitting edema. All alarms are set and audible. Resuscitation bag is at the head of the bed. Ventilator Settings Mode FIO2 Rate Tidal Volume Pressure PEEP I:E Ratio Pressure control  50 %   15  15 cm H20  10 cm H20  1:2   
 
Peak airway pressure: 25.3 cm H2O Minute ventilation: 7.7 l/min Federicodo Gatesr, RT

## 2020-01-23 NOTE — PROGRESS NOTES
Dr. Alex Tejada notified of CRRT clotting. Recommended to restring for remaining time. Mindi Brooks, dialysis RN notified.

## 2020-01-23 NOTE — PROGRESS NOTES
Patient unable to tolerate dopamine gtt. HR - multiple rhythms including afib RVR, SR, and HB. SpO2 decreased to 85%. BP stable @ 128/40. Pacemaker decreased to a backup of 60.

## 2020-01-23 NOTE — PROGRESS NOTES
Patients wife called and stated \" I need the doctor to call me, I have about four questions that I need to ask him about the two surgeries that they want to do on my  tomorrow\". Patients wife request a note to be written in the chart

## 2020-01-23 NOTE — PROGRESS NOTES
Ventilator check complete; patient has a #8. 0 ET tube secured at the 25 at the lip. Patient is able to follow commands. Breath sounds are coarse. Trachea is midline, Negative for subcutaneous air, and chest excursion is symmetric. Patient is also Negative for cyanosis and is Negative for pitting edema. All alarms are set and audible. Resuscitation bag is at the head of the bed. Ventilator Settings Mode FIO2 Rate Tidal Volume Pressure PEEP I:E Ratio Pressure support(weaned to PS)  50 %     8 cm H2O  10 cm H20  1:2   
 
Peak airway pressure: 19.3 cm H2O Minute ventilation: 11.4 l/min ABG: No results for input(s): PH, PCO2, PO2, HCO3 in the last 72 hours.  
 
 
Azra Alvarez, RT

## 2020-01-23 NOTE — PROGRESS NOTES
A follow up visit was made to the patient. Emotional support, spiritual presence and  
prayer were provided for the patient. EDMOND Ellis

## 2020-01-23 NOTE — PROGRESS NOTES
Dr. Berto Duvall at bedside. Updated about possibility of extubation. Will discuss further with Dr. Francisca Oneil due to pending PPM procedure in AM.

## 2020-01-23 NOTE — DIALYSIS
12HR CRRT  initiated using LTPC. Aspirated and flushed both ports without difficulty. Machine settings per MD order. 200 UFR  300 DFR  150 BFR  4K Citracate Bath. Reported to primary nurse. Dialysis nurse available as needed.

## 2020-01-23 NOTE — PROGRESS NOTES
Renal Progress Note Admission Date: 1/10/2020 Subjective: Intubated , more awake. Nods to questions Objective:  
 
Physical Exam:   
Patient Vitals for the past 8 hrs: 
 BP Temp Pulse Resp SpO2 Weight  
01/23/20 0800 156/75  86 (!) 35 (!) 88 %   
01/23/20 0729 (!) 139/101  84 (!) 38 95 %   
01/23/20 0659 148/84  82 27 95 %   
01/23/20 0631 162/67  87 24 93 %   
01/23/20 0615   81 29 92 %   
01/23/20 0600 159/70  81 (!) 32 (!) 88 %   
01/23/20 0545   61 (!) 0 93 %   
01/23/20 0531 103/55  61 20 94 %   
01/23/20 0530   62 (!) 0 94 %   
01/23/20 0517      150.4 kg (331 lb 9.2 oz) 01/23/20 0515   64 (!) 0 94 %   
01/23/20 0500 (!) 83/53  65 24 92 %   
01/23/20 0445   65 9 93 %   
01/23/20 0438   70 13 92 %   
01/23/20 0430   86 25 92 %   
01/23/20 0412   72 21    
01/23/20 0401   67     
01/23/20 0345   77 24 100 %   
01/23/20 0330 97/65 98.4 °F (36.9 °C) 62 20 98 %   
01/23/20 0315   60  97 %   
01/23/20 0300   60 9 97 %   
01/23/20 0245   60 20 95 %   
01/23/20 0230 (!) 89/56  63 24 97 %   
01/23/20 0215   72 17 99 %   
01/23/20 0200   61 20 97 %   
01/23/20 0145   60  95 %   
01/23/20 0130   65 20 96 %   
01/23/20 0115   74 18 (!) 89 %   
01/23/20 0100   60 20 100 %   
01/23/20 0045   (!) 58 (!) 0 100 %   
01/23/20 0030 103/55  62 12 99 %   
01/23/20 0015   (!) 59 12 99 %  Gen: intubated, comfortable HEENT: moist membranes CV: S1, S2 
Lungs: Coarse bilaterally Extem: 2+ edema Current Facility-Administered Medications Medication Dose Route Frequency  famotidine (PEPCID) 40 mg/5 mL (8 mg/mL) oral suspension 20 mg  20 mg Per NG tube DAILY  amiodarone (CORDARONE) 450 mg in dextrose 5% 250 mL infusion  0.5-1 mg/min IntraVENous TITRATE  dexmedeTOMidine (PRECEDEX) 400 mcg in 0.9% sodium chloride 100 mL infusion  0.2-0.7 mcg/kg/hr IntraVENous TITRATE  amiodarone (CORDARONE) tablet 400 mg  400 mg Per NG tube Q12H  
 epoetin maritza-epbx (RETACRIT) 14,000 Units combo injection  14,000 Units SubCUTAneous Q7D  
 argatroban 50 mg in 0.9% sodium chloride 50 mL (1000 mcg/mL) infusion  0.5-10 mcg/kg/min IntraVENous TITRATE  alcohol 62% (NOZIN) nasal  1 Ampule  1 Ampule Topical Q12H  
 NUTRITIONAL SUPPORT ELECTROLYTE PRN ORDERS   Does Not Apply PRN  
 NOREPINephrine (LEVOPHED) 16,000 mcg in dextrose 5% 250 mL infusion  0.05-0.2 mcg/kg/min IntraVENous TITRATE  albuterol (PROVENTIL VENTOLIN) nebulizer solution 2.5 mg  2.5 mg Nebulization QID PRN  
 fentaNYL in normal saline (pf) 25 mcg/mL infusion  0-200 mcg/hr IntraVENous TITRATE  midazolam (VERSED) 100 mg in 0.9% sodium chloride 100 mL infusion  0-10 mg/hr IntraVENous TITRATE  acetaminophen (TYLENOL) solution 650 mg  650 mg Per NG tube Q4H PRN  
 0.9% sodium chloride infusion  25 mL/hr IntraVENous CONTINUOUS  
 sodium chloride (NS) flush 5-40 mL  5-40 mL IntraVENous Q8H  
 sodium chloride (NS) flush 5-40 mL  5-40 mL IntraVENous PRN  
 oxyCODONE-acetaminophen (PERCOCET) 5-325 mg per tablet 1 Tab  1 Tab Oral Q4H PRN  
 morphine 10 mg/ml injection 3 mg  3 mg IntraVENous Q1H PRN  
 naloxone (NARCAN) injection 0.4 mg  0.4 mg IntraVENous PRN  
 EPINEPHrine (ADRENALIN) 4 mg in 0.9% sodium chloride 250 mL infusion  0.04-0.1 mcg/kg/min IntraVENous TITRATE  [Held by provider] metoprolol tartrate (LOPRESSOR) tablet 25 mg  25 mg Oral Q12H  
 atorvastatin (LIPITOR) tablet 80 mg  80 mg Oral QHS  insulin regular (NOVOLIN R, HUMULIN R) 100 Units in 0.9% sodium chloride 100 mL infusion  1 Units/hr IntraVENous TITRATE  dextrose (D50W) injection syrg 12.5 g  25 mL IntraVENous PRN  
 [Held by provider] aspirin chewable tablet 81 mg  81 mg Oral DAILY  magnesium sulfate 1 g/100 ml IVPB (premix or compounded)  1 g IntraVENous PRN  
 midazolam (VERSED) injection 1 mg  1 mg IntraVENous Q1H PRN  
  chlorhexidine (PERIDEX) 0.12 % mouthwash 10 mL  10 mL Oral BID  morphine 10 mg/ml injection 4 mg  4 mg IntraVENous Q1H PRN Or  
 morphine 10 mg/ml injection 5 mg  5 mg IntraVENous Q1H PRN  
 dextrose 5 % - 0.45% NaCl infusion  25 mL/hr IntraVENous CONTINUOUS  
 vasopressin (VASOSTRICT) 20 Units in 0.9% sodium chloride 100 mL infusion  0-0.1 Units/min IntraVENous TITRATE  PHENYLephrine (PF)(LEWIS-SYNEPHRINE) 30 mg in 0.9% sodium chloride 250 mL infusion   mcg/min IntraVENous TITRATE  sodium bicarbonate (8.4%) injection 50 mEq  50 mEq IntraVENous PRN Data Review:  
 
LABS:  
Recent Results (from the past 12 hour(s)) GLUCOSE, POC Collection Time: 01/22/20  9:33 PM  
Result Value Ref Range Glucose (POC) 138 (H) 65 - 100 mg/dL GLUCOSE, POC Collection Time: 01/22/20 11:01 PM  
Result Value Ref Range Glucose (POC) 112 (H) 65 - 100 mg/dL GLUCOSE, POC Collection Time: 01/22/20 11:58 PM  
Result Value Ref Range Glucose (POC) 126 (H) 65 - 100 mg/dL GLUCOSE, POC Collection Time: 01/23/20  2:08 AM  
Result Value Ref Range Glucose (POC) 92 65 - 100 mg/dL METABOLIC PANEL, BASIC Collection Time: 01/23/20  3:21 AM  
Result Value Ref Range Sodium 141 136 - 145 mmol/L Potassium 4.0 3.5 - 5.1 mmol/L Chloride 105 98 - 107 mmol/L  
 CO2 31 21 - 32 mmol/L Anion gap 5 (L) 7 - 16 mmol/L Glucose 108 (H) 65 - 100 mg/dL BUN 23 8 - 23 MG/DL Creatinine 2.24 (H) 0.8 - 1.5 MG/DL  
 GFR est AA 37 (L) >60 ml/min/1.73m2 GFR est non-AA 31 (L) >60 ml/min/1.73m2 Calcium 7.8 (L) 8.3 - 10.4 MG/DL  
PTT Collection Time: 01/23/20  3:21 AM  
Result Value Ref Range aPTT 62.7 (H) 24.7 - 39.8 SEC  
CBC W/O DIFF Collection Time: 01/23/20  3:29 AM  
Result Value Ref Range WBC 13.1 (H) 4.3 - 11.1 K/uL  
 RBC 3.14 (L) 4.23 - 5.6 M/uL HGB 9.5 (L) 13.6 - 17.2 g/dL HCT 30.0 (L) 41.1 - 50.3 %  MCV 95.5 79.6 - 97.8 FL  
 MCH 30.3 26.1 - 32.9 PG  
 MCHC 31.7 31.4 - 35.0 g/dL  
 RDW 15.8 (H) 11.9 - 14.6 % PLATELET 47 (L) 377 - 450 K/uL MPV 12.4 (H) 9.4 - 12.3 FL ABSOLUTE NRBC 0.00 0.0 - 0.2 K/uL GLUCOSE, POC Collection Time: 01/23/20  3:33 AM  
Result Value Ref Range Glucose (POC) 107 (H) 65 - 100 mg/dL POC G3 Collection Time: 01/23/20  4:34 AM  
Result Value Ref Range Device: VENT    
 FIO2 (POC) 50 % pH (POC) 7.360 7.35 - 7.45    
 pCO2 (POC) 55.4 (H) 35 - 45 MMHG  
 pO2 (POC) 110 (H) 75 - 100 MMHG  
 HCO3 (POC) 31.3 (H) 22 - 26 MMOL/L  
 sO2 (POC) 98 95 - 98 % Base excess (POC) 5 mmol/L Mode ASSIST CONTROL Set Rate 15 bpm  
 PEEP/CPAP (POC) 10 cmH2O Allens test (POC) NOT APPLICABLE Site DRAWN FROM ARTERIAL LINE Specimen type (POC) ARTERIAL Performed by Micky   
 CO2, POC 33 MMOL/L Critical value read back 04:34 Exhaled minute volume 8.10 L/min COLLECT TIME 428 GLUCOSE, POC Collection Time: 01/23/20  6:18 AM  
Result Value Ref Range Glucose (POC) 70 65 - 100 mg/dL Plan:  
 
Principal Problem: 
  CAD (coronary artery disease) (1/10/2020) Active Problems: Aortic valve stenosis (1/10/2020) Weaning from respirator [facility]) (1/10/2020) Acute hypoxemic respiratory failure ([address] (1/10/2020) S/P CABG x 3 (1/10/2020) S/P AVR (1/10/2020) Acute renal failure (ARF) ([address] (1/11/2020) Atrial fibrillation ([address] (1/13/2020) Shock ([address] (1/15/2020) Bilateral pneumothorax (1/17/2020) Thrombocytopenia ([address] (1/17/2020) Acute oon CKD - Renal US unremarkable. 
  
ASHD/ AS - s/p CABG  Aortic Valve replacement 
  
DM 
  
HTN/ Volume - UF as tolerated 
  
Resp Failure - Hypoxic Resp failure from pulmonary edema on vent. 
  
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 
 
Still volume issues. Will continue CRRT. Planning extubation today. Will plan for 12 hour SLED Addendum: The patient was seen on dialysis at 12:53 PM .  BP idropped but no longer in sinus rhythm Catheter is functioning well.   Continuing same UF.

## 2020-01-23 NOTE — PROGRESS NOTES
Patient had a brief decrease in HR to pacing at 40. Patient symptomatic with SBP down to 70s. Paced patient briefly at 80. Patient own rhythm recovered back to SR in the 70s. BP also stabilized.

## 2020-01-23 NOTE — WOUND CARE
Patient seen for left leg incisional wound/drainage. Noted large serous drainage on old dressing and it continued to weep during dressing change. Noted 5 incision lines. Proximal 1st and 3rd weeping, remaining dry. Seen with Arvin AGOSTO with CV team and incisional dressing placed to top 3 incisions, dry dressing to lower 2 dry incisions. Patient has leg swelling and is high risk for dehiscence. No signs of local infection visible. Patient remains in CVICU. Noted toes are light dusky purple. Discussed status with primary nurse. Turned patient to left side with nurse. Floated heels, nurse reports patient kicks off Rooke boots. Important to keep heels offloaded as his has risk for pressure injury. Also evaluated gluteal cleft fold, noted fissure from moisture and inside deep cleft a purple gray irregular area from possible shear noted. Continue present wound dressing as ordered to this site. Answered all questions. Will change VAC dressing 2 times a week as discussed with PA at bedside.

## 2020-01-23 NOTE — PROGRESS NOTES
Dr. Marilu Chilel updated about patient status and plan of care. Plan to hold on tracheostomy for now and continue with PPM in AM.  Dr. Marilu Chilel updated wife via phone. All questions answered and wife verbalized understanding.

## 2020-01-23 NOTE — PROGRESS NOTES
Angelo Woodruff Admission Date: 1/10/2020 Daily Progress Note: 1/23/2020 The patient's chart is reviewed and the patient is discussed with the staff. Patient had CABG x 3 and AVR on 1/10.  Slow to improve. Had small ptx that self resolved. 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 Subjective:  
 
Patient day 13 on vent. On PS but was on 10PEEP and FiO2 50. These were just reduced to PEEP 8 and FiO2 40. Patient very tachypneic at present with RR in the 40's and Vt in the low to mid 300's. On precedex 0.7. 1.7L off with CRRT last night but was still more than 1L positive in last 24 hours. Off pressors at this point. Current Facility-Administered Medications Medication Dose Route Frequency  fentaNYL in normal saline (pf) 25 mcg/mL infusion   mcg/hr IntraVENous TITRATE  famotidine (PEPCID) 40 mg/5 mL (8 mg/mL) oral suspension 20 mg  20 mg Per NG tube DAILY  amiodarone (CORDARONE) 450 mg in dextrose 5% 250 mL infusion  0.5-1 mg/min IntraVENous TITRATE  dexmedeTOMidine (PRECEDEX) 400 mcg in 0.9% sodium chloride 100 mL infusion  0.2-0.7 mcg/kg/hr IntraVENous TITRATE  amiodarone (CORDARONE) tablet 400 mg  400 mg Per NG tube Q12H  
 epoetin maritza-epbx (RETACRIT) 14,000 Units combo injection  14,000 Units SubCUTAneous Q7D  
 argatroban 50 mg in 0.9% sodium chloride 50 mL (1000 mcg/mL) infusion  0.5-10 mcg/kg/min IntraVENous TITRATE  alcohol 62% (NOZIN) nasal  1 Ampule  1 Ampule Topical Q12H  
 NUTRITIONAL SUPPORT ELECTROLYTE PRN ORDERS   Does Not Apply PRN  
 NOREPINephrine (LEVOPHED) 16,000 mcg in dextrose 5% 250 mL infusion  0.05-0.2 mcg/kg/min IntraVENous TITRATE  albuterol (PROVENTIL VENTOLIN) nebulizer solution 2.5 mg  2.5 mg Nebulization QID PRN  
  midazolam (VERSED) 100 mg in 0.9% sodium chloride 100 mL infusion  0-10 mg/hr IntraVENous TITRATE  acetaminophen (TYLENOL) solution 650 mg  650 mg Per NG tube Q4H PRN  
 0.9% sodium chloride infusion  25 mL/hr IntraVENous CONTINUOUS  
 sodium chloride (NS) flush 5-40 mL  5-40 mL IntraVENous Q8H  
 sodium chloride (NS) flush 5-40 mL  5-40 mL IntraVENous PRN  
 oxyCODONE-acetaminophen (PERCOCET) 5-325 mg per tablet 1 Tab  1 Tab Oral Q4H PRN  
 morphine 10 mg/ml injection 3 mg  3 mg IntraVENous Q1H PRN  
 naloxone (NARCAN) injection 0.4 mg  0.4 mg IntraVENous PRN  
 EPINEPHrine (ADRENALIN) 4 mg in 0.9% sodium chloride 250 mL infusion  0.04-0.1 mcg/kg/min IntraVENous TITRATE  [Held by provider] metoprolol tartrate (LOPRESSOR) tablet 25 mg  25 mg Oral Q12H  
 atorvastatin (LIPITOR) tablet 80 mg  80 mg Oral QHS  insulin regular (NOVOLIN R, HUMULIN R) 100 Units in 0.9% sodium chloride 100 mL infusion  1 Units/hr IntraVENous TITRATE  dextrose (D50W) injection syrg 12.5 g  25 mL IntraVENous PRN  
 [Held by provider] aspirin chewable tablet 81 mg  81 mg Oral DAILY  magnesium sulfate 1 g/100 ml IVPB (premix or compounded)  1 g IntraVENous PRN  
 midazolam (VERSED) injection 1 mg  1 mg IntraVENous Q1H PRN  chlorhexidine (PERIDEX) 0.12 % mouthwash 10 mL  10 mL Oral BID  morphine 10 mg/ml injection 4 mg  4 mg IntraVENous Q1H PRN Or  
 morphine 10 mg/ml injection 5 mg  5 mg IntraVENous Q1H PRN  
 dextrose 5 % - 0.45% NaCl infusion  25 mL/hr IntraVENous CONTINUOUS  
 vasopressin (VASOSTRICT) 20 Units in 0.9% sodium chloride 100 mL infusion  0-0.1 Units/min IntraVENous TITRATE  PHENYLephrine (PF)(LEWIS-SYNEPHRINE) 30 mg in 0.9% sodium chloride 250 mL infusion   mcg/min IntraVENous TITRATE  sodium bicarbonate (8.4%) injection 50 mEq  50 mEq IntraVENous PRN Review of Systems Unobtainable due to patient status. Objective:  
 
Vitals: 01/23/20 0729 01/23/20 0800 01/23/20 0811 01/23/20 0830 BP: (!) 139/101 156/75  (!) 137/100 Pulse: 84 86 82 87 Resp: (!) 38 (!) 35 25 (!) 39 Temp:      
SpO2: 95% (!) 88% 94% (!) 88% Weight:      
Height:      
 
Intake and Output:  
01/21 1901 - 01/23 0700 In: 3774.7 [I.V.:2053.7] Out: 1832 [Urine:45] No intake/output data recorded. Physical Exam:  
Constitution:  the patient is well developed and in moderate respiratory distress EENMT:  Sclera clear, pupils equal, oral mucosa moist 
Respiratory: ETT in place. Distant but clear. Cardiovascular:  RRR without M,G,R 
Gastrointestinal: soft and non-tender; with positive bowel sounds. Musculoskeletal: warm without cyanosis. There is 1+ B lower leg edema. Skin:  no jaundice or rashes, surgical wounds Neurologic: no gross neuro deficits Psychiatric:  alert and nodding to questions CHEST XRAY:  
1/23/20 IMPRESSION: 
Stable abnormal exam with tubes and lines as described above Atherosclerosis LAB No lab exists for component: [place] Recent Labs  
  01/23/20 
0329 01/22/20 
1515 01/22/20 
0544 01/22/20 
0257 01/21/20 
0329 WBC 13.1*  --   --  11.7* 14.6* HGB 9.5* 8.7* 7.4* 7.4* 8.4* HCT 30.0* 27.8* 23.7* 23.2* 26.3*  
PLT 47*  --   --  45* 20* Recent Labs  
  01/23/20 
0321 01/22/20 
0257 01/21/20 
0329  145 145  
K 4.0 4.1 3.8  111* 110* CO2 31 29 31 * 64* 129* BUN 23 20 8 CREA 2.24* 2.29* 1.08  
MG  --  1.9 1.6*  
CA 7.8* 7.9* 7.7* ABG:   
Lab Results Component Value Date/Time PHI 7.360 01/23/2020 04:34 AM  
 PCO2I 55.4 (H) 01/23/2020 04:34 AM  
 PO2I 110 (H) 01/23/2020 04:34 AM  
 HCO3I 31.3 (H) 01/23/2020 04:34 AM  
 FIO2I 50 01/23/2020 04:34 AM  
 
 
 
Assessment:  (Medical Decision Making) Hospital Problems  Date Reviewed: 12/6/2019 Codes Class Noted POA  
 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 
ICD-9-CM: 427.31  1/13/2020 Unknown Acute renal failure (ARF) ([facility]) ICD-10-CM: N17.9 ICD-9-CM: 584.9  1/11/2020 Unknown Aortic valve stenosis ICD-10-CM: I35.0 ICD-9-CM: 424.1  1/10/2020 Unknown * (Principal) CAD (coronary artery disease) ICD-10-CM: I25.10 ICD-9-CM: 414.00  1/10/2020 Unknown Weaning from respirator [facility]) ICD-10-CM: Z99.11 ICD-9-CM: V46.13  1/10/2020 Unknown Acute hypoxemic respiratory failure ([facility] ICD-10-CM: J96.01 
ICD-9-CM: 518.81  1/10/2020 S/P CABG x 3 ICD-10-CM: Z95.1 ICD-9-CM: V45.81  1/10/2020 Unknown S/P AVR ICD-10-CM: Z95.2 ICD-9-CM: V43.3  1/10/2020 Unknown Patient with prolonged intubation s/p cabg and avr. Current indications are that he would likely fail extubation with his severe tachypnea and low tidal volumes. HIT + both on ROSA and SHARON. LE doppler with B lower extremity clot. Renal failure so unable to perform CT PE. Plan:  (Medical Decision Making)  
-will add fentanyl drip at low dose and slowly uptitrate to see if any of his tachypnea is coming from treatable pain/anxiety while trying to maintain him as alert as possible.  
-will need to reassess him once this is in place to see if he may be able to extubate today.  
-if he can, will need to extubate him to bipap.  
-continue volume removal with CRRT as this will improve his chances of successful extubation.  
-cont argatroban for HIT.   
 
 
 
Valerio Fleming MD

## 2020-01-23 NOTE — PROGRESS NOTES
POD 13 Days Post-Op Procedure:  Procedure(s): CORONARY ARTERY BYPASS GRAFT WITH AVR/ (CABG X 3 )/ LIMA VEIN HARVEST/ GREATER SAPHENOUS 
ESOPHAGEAL TRANS ECHOCARDIOGRAM 
 
 
Subjective:  
 
Awake and following commands Objective:  
 
Patient Vitals for the past 8 hrs: 
 BP Temp Pulse Resp SpO2 Weight  
20 0615   81 29 92 %   
20 0600 159/70  81 (!) 32 (!) 88 %   
20 0545   61 (!) 0 93 %   
20 0531 103/55  61 20 94 %   
20 0530   62 (!) 0 94 %   
20 0517      331 lb 9.2 oz (150.4 kg) 20 0515   64 (!) 0 94 %   
20 0500 (!) 83/53  65 24 92 %   
20 0445   65 9 93 %   
20 0438   70 13 92 %   
20 0430   86 25 92 %   
20 0412   72 21    
20 0401   67     
20 0345   77 24 100 %   
20 0330 97/65 98.4 °F (36.9 °C) 62 20 98 %   
20 0315   60  97 %   
20 0300   60 9 97 %   
20 0245   60 20 95 %   
20 0230 (!) 89/56  63 24 97 %   
20 0215   72 17 99 %   
20 0200   61 20 97 %   
20 0145   60  95 %   
20 0130   65 20 96 %   
20 0115   74 18 (!) 89 %   
20 0100   60 20 100 %   
20 0045   (!) 58 (!) 0 100 %   
20 0030 103/55  62 12 99 %   
20 0015   (!) 59 12 99 %   
20 2345   61  99 %  Temp (24hrs), Av.3 °F (36.8 °C), Min:98.2 °F (36.8 °C), Max:98.4 °F (36.9 °C) Hemodynamics PAP Systolic: 50 PAP 
CO (l/min): 7.5 l/min CO 
CI (l/min/m2): 2.9 l/min/m2 CI No intake/output data recorded.  1901 -  0700 In: 3774.7 [I.V.:3.7] Out: 1832 [Urine:45] CT Drainage 
  
  
   
  total of all CT's Heart:  regular rate and rhythm, S1, S2 normal, no murmur, click, rub or gallop Lung:  clear to auscultation bilaterally Neuro: Grossly non focal 
Incisions: Clean, dry, and intact Labs: 
Recent Results (from the past 24 hour(s)) TYPE + CROSSMATCH Collection Time: 01/22/20  8:05 AM  
Result Value Ref Range Crossmatch Expiration 01/25/2020 ABO/Rh(D) AB POSITIVE Antibody screen NEG Unit number [id number] Blood component type [facility] Unit division 00 Status of unit ISSUED Crossmatch result Compatible Unit number [id number] Blood component type [facility] Unit division 00 Status of unit ISSUED Crossmatch result Compatible Unit number [id number] Blood component type [facility] Unit division 00 Status of unit ISSUED Crossmatch result Compatible GLUCOSE, POC Collection Time: 01/22/20 10:39 AM  
Result Value Ref Range Glucose (POC) 152 (H) 65 - 100 mg/dL HGB & HCT Collection Time: 01/22/20  3:15 PM  
Result Value Ref Range HGB 8.7 (L) 13.6 - 17.2 g/dL HCT 27.8 (L) 41.1 - 50.3 % GLUCOSE, POC Collection Time: 01/22/20  3:20 PM  
Result Value Ref Range Glucose (POC) 158 (H) 65 - 100 mg/dL GLUCOSE, POC Collection Time: 01/22/20  5:24 PM  
Result Value Ref Range Glucose (POC) 137 (H) 65 - 100 mg/dL GLUCOSE, POC Collection Time: 01/22/20  9:33 PM  
Result Value Ref Range Glucose (POC) 138 (H) 65 - 100 mg/dL GLUCOSE, POC Collection Time: 01/22/20 11:01 PM  
Result Value Ref Range Glucose (POC) 112 (H) 65 - 100 mg/dL GLUCOSE, POC Collection Time: 01/22/20 11:58 PM  
Result Value Ref Range Glucose (POC) 126 (H) 65 - 100 mg/dL GLUCOSE, POC Collection Time: 01/23/20  2:08 AM  
Result Value Ref Range Glucose (POC) 92 65 - 100 mg/dL METABOLIC PANEL, BASIC Collection Time: 01/23/20  3:21 AM  
Result Value Ref Range Sodium 141 136 - 145 mmol/L Potassium 4.0 3.5 - 5.1 mmol/L Chloride 105 98 - 107 mmol/L  
 CO2 31 21 - 32 mmol/L Anion gap 5 (L) 7 - 16 mmol/L Glucose 108 (H) 65 - 100 mg/dL BUN 23 8 - 23 MG/DL Creatinine 2.24 (H) 0.8 - 1.5 MG/DL  
 GFR est AA 37 (L) >60 ml/min/1.73m2 GFR est non-AA 31 (L) >60 ml/min/1.73m2 Calcium 7.8 (L) 8.3 - 10.4 MG/DL  
PTT Collection Time: 01/23/20  3:21 AM  
Result Value Ref Range aPTT 62.7 (H) 24.7 - 39.8 SEC  
CBC W/O DIFF Collection Time: 01/23/20  3:29 AM  
Result Value Ref Range WBC 13.1 (H) 4.3 - 11.1 K/uL  
 RBC 3.14 (L) 4.23 - 5.6 M/uL HGB 9.5 (L) 13.6 - 17.2 g/dL HCT 30.0 (L) 41.1 - 50.3 % MCV 95.5 79.6 - 97.8 FL  
 MCH 30.3 26.1 - 32.9 PG  
 MCHC 31.7 31.4 - 35.0 g/dL  
 RDW 15.8 (H) 11.9 - 14.6 % PLATELET 47 (L) 553 - 450 K/uL MPV 12.4 (H) 9.4 - 12.3 FL ABSOLUTE NRBC 0.00 0.0 - 0.2 K/uL GLUCOSE, POC Collection Time: 01/23/20  3:33 AM  
Result Value Ref Range Glucose (POC) 107 (H) 65 - 100 mg/dL POC G3 Collection Time: 01/23/20  4:34 AM  
Result Value Ref Range Device: VENT    
 FIO2 (POC) 50 % pH (POC) 7.360 7.35 - 7.45    
 pCO2 (POC) 55.4 (H) 35 - 45 MMHG  
 pO2 (POC) 110 (H) 75 - 100 MMHG  
 HCO3 (POC) 31.3 (H) 22 - 26 MMOL/L  
 sO2 (POC) 98 95 - 98 % Base excess (POC) 5 mmol/L Mode ASSIST CONTROL Set Rate 15 bpm  
 PEEP/CPAP (POC) 10 cmH2O Allens test (POC) NOT APPLICABLE Site DRAWN FROM ARTERIAL LINE Specimen type (POC) ARTERIAL Performed by Micky   
 CO2, POC 33 MMOL/L Critical value read back 04:34 Exhaled minute volume 8.10 L/min COLLECT TIME 428 GLUCOSE, POC Collection Time: 01/23/20  6:18 AM  
Result Value Ref Range Glucose (POC) 70 65 - 100 mg/dL Assessment:  
 
Principal Problem: 
  CAD (coronary artery disease) (1/10/2020) Active Problems: Aortic valve stenosis (1/10/2020) Weaning from respirator [facility]) (1/10/2020) Acute hypoxemic respiratory failure ([address] (1/10/2020) S/P CABG x 3 (1/10/2020) S/P AVR (1/10/2020) Acute renal failure (ARF) ([address] (1/11/2020) Atrial fibrillation ([address] (1/13/2020) Shock ([address] (1/15/2020) Bilateral pneumothorax (1/17/2020) Thrombocytopenia ([facility] (1/17/2020) Plan/Recommendations/Medical Decision Making:  
 
Extubate, no longer paced, ? PPM 
 
See orders

## 2020-01-23 NOTE — PROGRESS NOTES
[facility] CARDIOLOGY PROGRESS NOTE 
      
 
1/23/2020 7:02 AM 
 
Admit Date: 1/10/2020 Subjective:  
Patient remains intubated in ICU. Now very awake and agitated. Off all pressors. Plans for trach and pacemaker tomorrow. ROS:  UNABLE TO OBTAIN DUE TO INABILITY OF PATIENT TO COMMUNICATE SECONDARY TO CURRENT INTUBATION AND NEED FOR MECHANICAL VENTILATION. Objective:  
  
Vitals:  
 01/23/20 0531 01/23/20 0545 01/23/20 0600 01/23/20 0615 BP: 103/55  159/70 Pulse: 61 61 81 81 Resp: 20 (!) 0 (!) 32 29 Temp:      
SpO2: 94% 93% (!) 88% 92% Weight:      
Height:      
 
 
Physical Exam: 
General-Intubated. Obese WM in NAD Neck- supple, no JVD 
CV- regular rate and rhythm no MRG Lung- clear bilaterally Abd- soft, nontender, nondistended Ext- trivial to 1+ edema bilaterally. Skin- warm and dry Psychiatric:  Unable to accurately assess due to intubated and sedated status. Neurologic:  Alert. Data Review:  
Labs:  
Recent Labs  
  01/23/20 
0329 01/23/20 
0321 01/22/20 
1515  01/22/20 
0257 01/21/20 
0329 NA  --  141  --   --  145 145 K  --  4.0  --   --  4.1 3.8 MG  --   --   --   --  1.9 1.6*  
BUN  --  23  --   --  20 8 CREA  --  2.24*  --   --  2.29* 1.08  
GLU  --  108*  --   --  64* 129* WBC 13.1*  --   --   --  11.7* 14.6* HGB 9.5*  --  8.7*   < > 7.4* 8.4* HCT 30.0*  --  27.8*   < > 23.2* 26.3*  
PLT 47*  --   --   --  45* 20*  
 < > = values in this interval not displayed. No results found for: JENNA Escobedo TELEMETRY:  Sinus rhythm. Narrow QRS. TERRI (1/13/20): -  Left ventricle: Systolic function was normal. Ejection fraction was estimated to be 55 %. This study was inadequate for the evaluation of regional wall motion.  There was moderate concentric hypertrophy.  
-  Ventricular septum: There was paradoxical motion.  
-  Right ventricle: Systolic function was mildly reduced.  
 -  Left atrium: The atrium was mildly to moderately dilated.  
-  Aortic valve: A bioprosthesis was present. It exhibited normal function. There was no evidence for vegetation.  
-  Mitral valve: There was mild regurgitation.  
-  Tricuspid valve: There was mild regurgitation.  
-  Aorta, systemic arteries: There was mild atheroma.  
-  Pericardium: There was no pericardial effusion. 
  
 
Assessment/Plan:  
 
Principal Problem: 
  CAD (coronary artery disease) (1/10/2020) S/P multivessel CABG. Critically ill post op. appears much improved today. Off pressors. Supportive care. Active Problems: Aortic valve stenosis (1/10/2020) S/P AVR. Acute hypoxemic respiratory failure ([address] (1/10/2020) More alert. Check CXR. Would consider attempt at extubation prior to trach give his level of responsiveness. Defer to pulmonary. S/P CABG x 3 (1/10/2020) Noted. S/P AVR (1/10/2020) Normal function on TERRI Acute renal failure (ARF) ([address] (1/11/2020) On dialysis. Atrial fibrillation ([address] (1/13/2020) In sinus on amiodarone. Shock ([address] (1/15/2020) Improving. Wean levaphed Bilateral pneumothorax (1/17/2020) Defer management to primary team.   
 
  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 Intermittent 2:1 AV block Planning pacemaker in AM. Melissa Santos MD 
1/23/2020 7:02 AM

## 2020-01-23 NOTE — PROGRESS NOTES
Nutrition Follow up: TF management per nutritional support protocols. ( Dr Julio Amos) Assessment:  
Food/Nutrition History: Remains on vent and TF dependent via OGT. Levophed was stopped last night but dopamine started today. Continues with MAP < 65. Therefore would not advance TF. Abdomen semi-soft with hypoactive bowel sounds. Residuals 0-10 ml. Date of Last BM: 1-23 Noted hopeful expectation was to extubate today but plan is to remain on vent, hold TF at MN and have PPM  Tomorrow. Pertinent Medications: Pepcid, Insulin gtt (received 68 units yesterday), SSI ordered today, Dopamine, Fentanyl. IVF: NS at 25 ml/hr Pertinent labs: POC Glucoses:   mg/dl. Hx of DM with 42 units of Lantus bid and 40 units Humalog with evening meal 
Anthropometrics:Height: 5' 10\" (177.8 cm), Weight Source: Bed, Weight: 150.4 kg (331 lb 9.2 oz) Last 3 Recorded Weights in this Encounter 01/21/20 0400 01/22/20 0528 01/23/20 0846 Weight: 150.1 kg (330 lb 14.6 oz) 149.6 kg (329 lb 12.9 oz) 150.4 kg (331 lb 9.2 oz) Edema: Anasarca generalized, 4+BUE and BLE Pre-op standing scale weight: 136.6 kg UBW range per EMR: 300-306# Macronutrient needs:(using UBW (Usual body weight) 136.4 kg and IBW 75.5 kg) EER:  4232-7936 kcal /day (11-14 kcal/kg UBW) 
EPR:  128-151 grams protein/day (1.7-2 grams/kg IBW)-for CRRT Max CHO:  238 grams/day (50% kcal) Fluid:  1000-1500ml/d Intake/Comparative standards: Current TF: Vital 1.2 AF at 10 ml/hr with 10ml water q 1hr to provide 288 kcal/day (19% of needs), 18 grams protein/day (14% of needs), 26 grams CHO/day (does not exceed max CHO),  and ~ 440ml free water/day (~44% of needs) Intervention: 
Meals and snacks: NPO 
EN: Continue trickle TF. Nutrition Supplement Therapy: Electrolyte replacement per nutritional support protocols are active on MAR. Labs: Has active order for daily BMP and Mg, Phos MWF. IV: IVF per MD. 
 Coordination of nutrition care: Discussed with Anais Durham RN and OLYA Cheema Pacheco [facility], [address] [place] [address], [address], [address] [place] [address]

## 2020-01-23 NOTE — PROGRESS NOTES
Patient purposefully removed arterial line. Manual pressure held. Hemostasis achieved. Pressure dressing applied.

## 2020-01-23 NOTE — PROGRESS NOTES
Patient with increased agitation, kicking legs and beating arms on side rail, -200s 1 mg versed given

## 2020-01-24 NOTE — PROGRESS NOTES
A follow up visit was made to the patient. Emotional support, spiritual presence and  
prayer were provided for the patient. EDMOND Mondragon

## 2020-01-24 NOTE — PROGRESS NOTES
TRANSFER - IN REPORT: 
 
Verbal report received from [facility]) on Jovani Hess  being received from CCL(unit) for routine post - op Report consisted of patients Situation, Background, Assessment and  
Recommendations(SBAR). Information from the following report(s) SBAR, Kardex, OR Summary, Procedure Summary, Intake/Output, MAR, Recent Results, Med Rec Status and Cardiac Rhythm SR was reviewed with the receiving nurse. Opportunity for questions and clarification was provided. Assessment completed upon patients arrival to unit and care assumed.

## 2020-01-24 NOTE — PROGRESS NOTES
POD 14 Days Post-Op Procedure:  Procedure(s): CORONARY ARTERY BYPASS GRAFT WITH AVR/ (CABG X 3 )/ LIMA VEIN HARVEST/ GREATER SAPHENOUS 
ESOPHAGEAL TRANS ECHOCARDIOGRAM 
 
 
Subjective: On vent following commands Objective:  
 
Patient Vitals for the past 8 hrs: 
 BP Temp Pulse Resp SpO2 Weight  
20 0901 121/61  83 30 96 %   
20 0846 119/59  86 (!) 32 100 %   
20 0829 (!) 73/40  77 21 96 %   
20 0759 (!) 70/39  83 22 97 %   
20 0744 (!) 72/42  84 25 96 %   
20 0726 (!) 78/37  88 (!) 51 92 %   
20 0703 131/59 98 °F (36.7 °C) 96 (!) 41 96 %   
20 0630 (!) 162/94  (!) 125 (!) 91 91 %   
20 0616 153/77  (!) 138 (!) 37 96 %   
20 0601 (!) 163/92  (!) 135 (!) 67 95 %   
20 0544 101/64  (!) 105 (!) 78 93 %   
20 0537 (!) 88/53  93 (!) 56 94 %   
20 0507 106/55  99 (!) 56 (!) 89 %   
20 0448   (!) 126 25 97 %   
20 0401 152/67  98 (!) 45 95 %   
20 0344 96/50 99.2 °F (37.3 °C) 87 (!) 31 97 %   
20 0330 97/50  (!) 131 (!) 46 96 %   
20 0316 129/59  100 (!) 36 (!) 76 %   
20 0300   (!) 134 (!) 65 (!) 79 %   
20 0248 186/78  97 (!) 72 93 % 329 lb 12.9 oz (149.6 kg) 20 0245 (!) 237/101  97 (!) 53    
20 0230 138/80  65 30   Temp (24hrs), Av.5 °F (36.9 °C), Min:98 °F (36.7 °C), Max:99.2 °F (37.3 °C) Hemodynamics PAP Systolic: 50 PAP 
CO (l/min): 7.5 l/min CO 
CI (l/min/m2): 2.9 l/min/m2 CI No intake/output data recorded.  1901 -  0700 In: 3213.7 [I.V.:1941.7] Out: 1736 [Urine:15] CT Drainage 
  
  
   
  total of all CT's Heart:  regular rate and rhythm, S1, S2 normal, no murmur, click, rub or gallop Lung:  clear to auscultation bilaterally Neuro: Grossly non focal 
Incisions: Clean, dry, and intact Labs: 
Recent Results (from the past 24 hour(s)) GLUCOSE, POC  
 Collection Time: 01/23/20 12:56 PM  
Result Value Ref Range Glucose (POC) 147 (H) 65 - 100 mg/dL POTASSIUM Collection Time: 01/23/20  7:54 PM  
Result Value Ref Range Potassium 4.7 3.5 - 5.1 mmol/L MAGNESIUM Collection Time: 01/23/20  7:54 PM  
Result Value Ref Range Magnesium 1.7 (L) 1.8 - 2.4 mg/dL GLUCOSE, POC Collection Time: 01/23/20  7:54 PM  
Result Value Ref Range Glucose (POC) 195 (H) 65 - 100 mg/dL GLUCOSE, POC Collection Time: 01/23/20 11:37 PM  
Result Value Ref Range Glucose (POC) 126 (H) 65 - 100 mg/dL MAGNESIUM Collection Time: 01/24/20  4:46 AM  
Result Value Ref Range Magnesium 2.1 1.8 - 2.4 mg/dL METABOLIC PANEL, BASIC Collection Time: 01/24/20  4:46 AM  
Result Value Ref Range Sodium 141 136 - 145 mmol/L Potassium 5.0 3.5 - 5.1 mmol/L Chloride 104 98 - 107 mmol/L  
 CO2 30 21 - 32 mmol/L Anion gap 7 7 - 16 mmol/L Glucose 213 (H) 65 - 100 mg/dL BUN 22 8 - 23 MG/DL Creatinine 2.19 (H) 0.8 - 1.5 MG/DL  
 GFR est AA 38 (L) >60 ml/min/1.73m2 GFR est non-AA 32 (L) >60 ml/min/1.73m2 Calcium 8.2 (L) 8.3 - 10.4 MG/DL  
PTT Collection Time: 01/24/20  4:46 AM  
Result Value Ref Range aPTT 50.6 (H) 24.7 - 39.8 SEC PHOSPHORUS Collection Time: 01/24/20  4:46 AM  
Result Value Ref Range Phosphorus 5.4 (H) 2.3 - 3.7 MG/DL  
POC VENOUS BLOOD GAS Collection Time: 01/24/20  4:46 AM  
Result Value Ref Range Device: VENT    
 FIO2 (POC) 50 %  
 pH, venous (POC) 7.424 (H) 7.32 - 7.42    
 pCO2, venous (POC) 44.3 41 - 51 MMHG  
 pO2, venous (POC) 44 mmHg HCO3, venous (POC) 29.0 (H) 23 - 28 MMOL/L  
 sO2, venous (POC) 81 65 - 88 % Base excess, venous (POC) 4 mmol/L Mode ASSIST CONTROL Set Rate 15 bpm  
 PEEP/CPAP (POC) 8 cmH2O Allens test (POC) YES Site RIGHT RADIAL Specimen type (POC) VENOUS BLOOD Performed by Micky   
 CO2, POC 30 MMOL/L Pressure control 20 Respiratory comment: NurseNotified Exhaled minute volume 8.80 L/min COLLECT TIME 440 CBC W/O DIFF Collection Time: 01/24/20  4:50 AM  
Result Value Ref Range WBC 17.7 (H) 4.3 - 11.1 K/uL  
 RBC 3.28 (L) 4.23 - 5.6 M/uL  
 HGB 10.0 (L) 13.6 - 17.2 g/dL HCT 32.2 (L) 41.1 - 50.3 % MCV 98.2 (H) 79.6 - 97.8 FL  
 MCH 30.5 26.1 - 32.9 PG  
 MCHC 31.1 (L) 31.4 - 35.0 g/dL  
 RDW 15.5 (H) 11.9 - 14.6 % PLATELET 65 (L) 355 - 450 K/uL MPV 12.6 (H) 9.4 - 12.3 FL ABSOLUTE NRBC 0.02 0.0 - 0.2 K/uL GLUCOSE, POC Collection Time: 01/24/20  6:43 AM  
Result Value Ref Range Glucose (POC) 223 (H) 65 - 100 mg/dL Assessment:  
 
Principal Problem: 
  CAD (coronary artery disease) (1/10/2020) Active Problems: Aortic valve stenosis (1/10/2020) Weaning from respirator [facility]) (1/10/2020) Acute hypoxemic respiratory failure ([address] (1/10/2020) S/P CABG x 3 (1/10/2020) S/P AVR (1/10/2020) Acute renal failure (ARF) ([address] (1/11/2020) Atrial fibrillation ([address] (1/13/2020) Shock ([address] (1/15/2020) Bilateral pneumothorax (1/17/2020) Thrombocytopenia ([address] (1/17/2020) HIT (heparin-induced thrombocytopenia) ([address] (1/23/2020) Plan/Recommendations/Medical Decision Making: For PPM today, ? extubate See orders

## 2020-01-24 NOTE — PROGRESS NOTES
Very agitated RR in the 40's HR in the 140's. BP elevated. Shaking and banging on side rails. Kicking legs. 1 mg versed given.

## 2020-01-24 NOTE — PROGRESS NOTES
Dual skin assessment performed. Alleyvn in place with barrier cream applied. Dressings to midsternal chest, previous CT sites, and leg leg all clean dry and intact. New aquacel dressing to right chest.  Continued redness to left lower leg.

## 2020-01-24 NOTE — PROGRESS NOTES
TRANSFER - OUT REPORT: 
 
Dual Chamber PPM with Dr Tong Humphrey Biotron[place] DDDR  Site closed with sutures and glue Site covered with Aquacel No bleeding or hematoma noted at site. Site soft Verbal report given to Cat(name) marissa Macario  being transferred to CVICU(unit) for routine progression of care Report consisted of patients Situation, Background, Assessment and  
Recommendations(SBAR). Information from the following report(s) Procedure Summary was reviewed with the receiving nurse. Lines:  
Double Lumen 01/10/20 Right Internal jugular (Active) Central Line Being Utilized Yes 1/24/2020 11:01 AM  
Criteria for Appropriate Use Hemodynamically unstable, requiring monitoring lines, vasopressors, or volume resuscitation 1/24/2020 11:01 AM  
Site Assessment Clean, dry, & intact 1/24/2020 11:01 AM  
Infiltration Assessment 0 1/24/2020 11:01 AM  
Affected Extremity/Extremities Color distal to insertion site pink (or appropriate for race); Pulses palpable 1/24/2020 11:01 AM  
Date of Last Dressing Change 01/24/20 1/24/2020  7:03 AM  
Dressing Status Clean, dry, & intact 1/24/2020 11:01 AM  
Dressing Type Disk with Chlorhexadine gluconate (CHG); Transparent 1/24/2020 11:01 AM  
Action Taken Open ports on tubing capped 1/24/2020 11:01 AM  
Proximal Hub Color/Line Status White; Infusing 1/24/2020 11:01 AM  
Positive Blood Return (Medial Site) Yes 1/24/2020 11:01 AM  
Distal Hub Color/Line Status Brown;Flushed;Patent 1/24/2020 11:01 AM  
Positive Blood Return (Lateral Site) Yes 1/24/2020 11:01 AM  
Alcohol Cap Used No 1/23/2020  3:00 AM  
   
Peripheral IV 01/24/20 Right Forearm (Active) Site Assessment Clean, dry, & intact 1/24/2020 11:01 AM  
Phlebitis Assessment 0 1/24/2020 11:01 AM  
Infiltration Assessment 0 1/24/2020 11:01 AM  
Dressing Status Clean, dry, & intact 1/24/2020 11:01 AM  
Dressing Type Transparent 1/24/2020 11:01 AM  
 Hub Color/Line Status Pink;Flushed;Patent 1/24/2020 11:01 AM  
Action Taken Open ports on tubing capped 1/24/2020 11:01 AM  
  
 
Opportunity for questions and clarification was provided. Patient transported with: 
 Registered Nurse

## 2020-01-24 NOTE — DIALYSIS
Notified of SLED clotted off. New dialysis system set up and treatment resumed without difficulty per MD orders. Dialysis nurse available as needed.

## 2020-01-24 NOTE — PROGRESS NOTES
Carlos Chilel Admission Date: 1/10/2020 Daily Progress Note: 1/24/2020 The patient's chart is reviewed and the patient is discussed with the staff. Patient had CABG x 3 and AVR on 1/10.  Slow to improve. Had small ptx that self resolved. 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 Subjective:  
 
Currently on PC 20, F 15, PEEP 8, and 50%. RR are running in the 40's when awake. Remains on Precedex 0.7 along with fentanyl 100. Intermittently tachycardic into the 130's. PCM planned today. Current Facility-Administered Medications Medication Dose Route Frequency  fentaNYL in normal saline (pf) 25 mcg/mL infusion   mcg/hr IntraVENous TITRATE  insulin lispro (HUMALOG) injection   SubCUTAneous Q6H  
 DOPamine (INTROPIN) 800 mg in dextrose 5% 250 mL infusion  2-20 mcg/kg/min IntraVENous TITRATE  famotidine (PEPCID) 40 mg/5 mL (8 mg/mL) oral suspension 20 mg  20 mg Per NG tube DAILY  amiodarone (CORDARONE) 450 mg in dextrose 5% 250 mL infusion  0.5-1 mg/min IntraVENous TITRATE  dexmedeTOMidine (PRECEDEX) 400 mcg in 0.9% sodium chloride 100 mL infusion  0.2-0.7 mcg/kg/hr IntraVENous TITRATE  [Held by provider] amiodarone (CORDARONE) tablet 400 mg  400 mg Per NG tube Q12H  
 epoetin maritza-epbx (RETACRIT) 14,000 Units combo injection  14,000 Units SubCUTAneous Q7D  
 argatroban 50 mg in 0.9% sodium chloride 50 mL (1000 mcg/mL) infusion  0.5-10 mcg/kg/min IntraVENous TITRATE  alcohol 62% (NOZIN) nasal  1 Ampule  1 Ampule Topical Q12H  
 NUTRITIONAL SUPPORT ELECTROLYTE PRN ORDERS   Does Not Apply PRN  
 NOREPINephrine (LEVOPHED) 16,000 mcg in dextrose 5% 250 mL infusion  0.05-0.2 mcg/kg/min IntraVENous TITRATE  albuterol (PROVENTIL VENTOLIN) nebulizer solution 2.5 mg  2.5 mg Nebulization QID PRN  
  midazolam (VERSED) 100 mg in 0.9% sodium chloride 100 mL infusion  0-10 mg/hr IntraVENous TITRATE  acetaminophen (TYLENOL) solution 650 mg  650 mg Per NG tube Q4H PRN  
 0.9% sodium chloride infusion  25 mL/hr IntraVENous CONTINUOUS  
 sodium chloride (NS) flush 5-40 mL  5-40 mL IntraVENous Q8H  
 sodium chloride (NS) flush 5-40 mL  5-40 mL IntraVENous PRN  
 oxyCODONE-acetaminophen (PERCOCET) 5-325 mg per tablet 1 Tab  1 Tab Oral Q4H PRN  
 morphine 10 mg/ml injection 3 mg  3 mg IntraVENous Q1H PRN  
 naloxone (NARCAN) injection 0.4 mg  0.4 mg IntraVENous PRN  
 EPINEPHrine (ADRENALIN) 4 mg in 0.9% sodium chloride 250 mL infusion  0.04-0.1 mcg/kg/min IntraVENous TITRATE  [Held by provider] metoprolol tartrate (LOPRESSOR) tablet 25 mg  25 mg Oral Q12H  
 atorvastatin (LIPITOR) tablet 80 mg  80 mg Oral QHS  insulin regular (NOVOLIN R, HUMULIN R) 100 Units in 0.9% sodium chloride 100 mL infusion  1 Units/hr IntraVENous TITRATE  dextrose (D50W) injection syrg 12.5 g  25 mL IntraVENous PRN  
 [Held by provider] aspirin chewable tablet 81 mg  81 mg Oral DAILY  magnesium sulfate 1 g/100 ml IVPB (premix or compounded)  1 g IntraVENous PRN  
 midazolam (VERSED) injection 1 mg  1 mg IntraVENous Q1H PRN  chlorhexidine (PERIDEX) 0.12 % mouthwash 10 mL  10 mL Oral BID  morphine 10 mg/ml injection 4 mg  4 mg IntraVENous Q1H PRN Or  
 morphine 10 mg/ml injection 5 mg  5 mg IntraVENous Q1H PRN  
 dextrose 5 % - 0.45% NaCl infusion  25 mL/hr IntraVENous CONTINUOUS  
 vasopressin (VASOSTRICT) 20 Units in 0.9% sodium chloride 100 mL infusion  0-0.1 Units/min IntraVENous TITRATE  PHENYLephrine (PF)(LEWIS-SYNEPHRINE) 30 mg in 0.9% sodium chloride 250 mL infusion   mcg/min IntraVENous TITRATE  sodium bicarbonate (8.4%) injection 50 mEq  50 mEq IntraVENous PRN Review of Systems Unobtainable due to patient status. Objective:  
 
Vitals: 01/24/20 0401 01/24/20 0448 01/24/20 0507 01/24/20 2055 BP: 152/67  106/55 (!) 88/53 Pulse: 98 (!) 126 99 93 Resp: (!) 45 25 (!) 56 (!) 56 Temp:      
SpO2: 95% 97% (!) 89% 94% Weight:      
Height:      
 
Intake and Output:  
01/22 0701 - 01/23 1900 In: 3201.6 [I.V.:1696.6] Out: 3560 [Urine:35] 
01/23 1901 - 01/24 0700 In: 1188.6 [I.V.:567.6] Out: 1259 Physical Exam:  
Constitution:  the patient is well developed and in moderate respiratory distress EENMT:  Sclera clear, pupils equal, oral mucosa moist 
Respiratory: ETT in place. Distant but clear. Cardiovascular:  RRR without M,G,R 
Gastrointestinal: soft and non-tender; with positive bowel sounds. Musculoskeletal: warm without cyanosis. There is 1+ B lower leg edema. Skin:  no jaundice or rashes, surgical wounds Neurologic: no gross neuro deficits Psychiatric:  alert and nodding to questions CHEST XRAY:  
 
 
1/23/20 IMPRESSION: 
Stable abnormal exam with tubes and lines as described above Atherosclerosis LAB No lab exists for component: [place] Recent Labs  
  01/24/20 
0450 01/23/20 
0329 01/22/20 
1515 01/22/20 
0544 01/22/20 
0257 WBC 17.7* 13.1*  --   --  11.7* HGB 10.0* 9.5* 8.7* 7.4* 7.4* HCT 32.2* 30.0* 27.8* 23.7* 23.2*  
PLT 65* 47*  --   --  45* Recent Labs  
  01/24/20 
0446 01/23/20 
1954 01/23/20 
0321 01/22/20 
0257   --  141 145  
K 5.0 4.7 4.0 4.1   --  105 111* CO2 30  --  31 29 *  --  108* 64*  
BUN 22  --  23 20 CREA 2.19*  --  2.24* 2.29* MG 2.1 1.7*  --  1.9 CA 8.2*  --  7.8* 7.9*  
PHOS 5.4*  --   --   --   
 
ABG:   
Lab Results Component Value Date/Time FIO2I 50 01/24/2020 04:46 AM  
 
 
 
Assessment:  (Medical Decision Making) Hospital Problems  Date Reviewed: 12/6/2019 Codes Class Noted POA  
 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 
ICD-9-CM: 427.31  1/13/2020 Unknown Paroxysmal  
 Acute renal failure (ARF) ([facility]) ICD-10-CM: N17.9 ICD-9-CM: 584.9  1/11/2020 Unknown Per renal  
 Aortic valve stenosis ICD-10-CM: I35.0 ICD-9-CM: 424.1  1/10/2020 Unknown * (Principal) CAD (coronary artery disease) ICD-10-CM: I25.10 ICD-9-CM: 414.00  1/10/2020 Unknown Weaning from respirator [facility]) ICD-10-CM: Z99.11 ICD-9-CM: V46.13  1/10/2020 Unknown Acute hypoxemic respiratory failure ([facility] ICD-10-CM: J96.01 
ICD-9-CM: 518.81  1/10/2020 Now on considerable support with Ve currently 14L. NOT READY TO EXTUBATE. S/P CABG x 3 ICD-10-CM: Z95.1 ICD-9-CM: V45.81  1/10/2020 Unknown Per [facility]  
 S/P AVR ICD-10-CM: Z95.2 ICD-9-CM: V43.3  1/10/2020 Unknown Per [facility]  
  
 
Patient with prolonged intubation s/p CABG/AVR. Does not appear ready for extubation. Given his size, a tracheostomy would be preferred for optimal patient safety. Plan:  (Medical Decision Making) Continue vent support. PCM and dialysis planned today. Recommend trach as he almost certainly has severe JOAQUIM as well. Continue argatroban.   
 
 
 
Ankita Amaro MD

## 2020-01-24 NOTE — PROGRESS NOTES
Renal Progress Note Admission Date: 1/10/2020 Subjective: Intubated more hypotensive. Objective:  
 
Physical Exam:   
Patient Vitals for the past 8 hrs: 
 BP Temp Pulse Resp SpO2 Weight  
01/24/20 0759 (!) 70/39  83 22 97 %   
01/24/20 0744 (!) 72/42  84 25 96 %   
01/24/20 0726 (!) 78/37  88 (!) 51 92 %   
01/24/20 0703 131/59  96 (!) 41 96 %   
01/24/20 0630 (!) 162/94  (!) 125 (!) 91 91 %   
01/24/20 0616 153/77  (!) 138 (!) 37 96 %   
01/24/20 0601 (!) 163/92  (!) 135 (!) 67 95 %   
01/24/20 0544 101/64  (!) 105 (!) 78 93 %   
01/24/20 0537 (!) 88/53  93 (!) 56 94 %   
01/24/20 0507 106/55  99 (!) 56 (!) 89 %   
01/24/20 0448   (!) 126 25 97 %   
01/24/20 0401 152/67  98 (!) 45 95 %   
01/24/20 0344 96/50 99.2 °F (37.3 °C) 87 (!) 31 97 %   
01/24/20 0330 97/50  (!) 131 (!) 46 96 %   
01/24/20 0316 129/59  100 (!) 36 (!) 76 %   
01/24/20 0300   (!) 134 (!) 65 (!) 79 %   
01/24/20 0248 186/78  97 (!) 72 93 % 149.6 kg (329 lb 12.9 oz) 01/24/20 0245 (!) 237/101  97 (!) 53    
01/24/20 0230 138/80  65 30    
01/24/20 0021 (!) 84/52      Gen: intubated, comfortable HEENT: moist membranes CV: S1, S2 
Lungs: Coarse bilaterally Extem: 2+ edema Current Facility-Administered Medications Medication Dose Route Frequency  fentaNYL in normal saline (pf) 25 mcg/mL infusion   mcg/hr IntraVENous TITRATE  insulin lispro (HUMALOG) injection   SubCUTAneous Q6H  
 DOPamine (INTROPIN) 800 mg in dextrose 5% 250 mL infusion  2-20 mcg/kg/min IntraVENous TITRATE  famotidine (PEPCID) 40 mg/5 mL (8 mg/mL) oral suspension 20 mg  20 mg Per NG tube DAILY  amiodarone (CORDARONE) 450 mg in dextrose 5% 250 mL infusion  0.5-1 mg/min IntraVENous TITRATE  dexmedeTOMidine (PRECEDEX) 400 mcg in 0.9% sodium chloride 100 mL infusion  0.2-0.7 mcg/kg/hr IntraVENous TITRATE  [Held by provider] amiodarone (CORDARONE) tablet 400 mg  400 mg Per NG tube Q12H  epoetin maritza-epbx (RETACRIT) 14,000 Units combo injection  14,000 Units SubCUTAneous Q7D  
 argatroban 50 mg in 0.9% sodium chloride 50 mL (1000 mcg/mL) infusion  0.5-10 mcg/kg/min IntraVENous TITRATE  alcohol 62% (NOZIN) nasal  1 Ampule  1 Ampule Topical Q12H  
 NUTRITIONAL SUPPORT ELECTROLYTE PRN ORDERS   Does Not Apply PRN  
 NOREPINephrine (LEVOPHED) 16,000 mcg in dextrose 5% 250 mL infusion  0.05-0.2 mcg/kg/min IntraVENous TITRATE  albuterol (PROVENTIL VENTOLIN) nebulizer solution 2.5 mg  2.5 mg Nebulization QID PRN  
 midazolam (VERSED) 100 mg in 0.9% sodium chloride 100 mL infusion  0-10 mg/hr IntraVENous TITRATE  acetaminophen (TYLENOL) solution 650 mg  650 mg Per NG tube Q4H PRN  
 0.9% sodium chloride infusion  25 mL/hr IntraVENous CONTINUOUS  
 sodium chloride (NS) flush 5-40 mL  5-40 mL IntraVENous Q8H  
 sodium chloride (NS) flush 5-40 mL  5-40 mL IntraVENous PRN  
 oxyCODONE-acetaminophen (PERCOCET) 5-325 mg per tablet 1 Tab  1 Tab Oral Q4H PRN  
 morphine 10 mg/ml injection 3 mg  3 mg IntraVENous Q1H PRN  
 naloxone (NARCAN) injection 0.4 mg  0.4 mg IntraVENous PRN  
 EPINEPHrine (ADRENALIN) 4 mg in 0.9% sodium chloride 250 mL infusion  0.04-0.1 mcg/kg/min IntraVENous TITRATE  [Held by provider] metoprolol tartrate (LOPRESSOR) tablet 25 mg  25 mg Oral Q12H  
 atorvastatin (LIPITOR) tablet 80 mg  80 mg Oral QHS  insulin regular (NOVOLIN R, HUMULIN R) 100 Units in 0.9% sodium chloride 100 mL infusion  1 Units/hr IntraVENous TITRATE  dextrose (D50W) injection syrg 12.5 g  25 mL IntraVENous PRN  
 [Held by provider] aspirin chewable tablet 81 mg  81 mg Oral DAILY  magnesium sulfate 1 g/100 ml IVPB (premix or compounded)  1 g IntraVENous PRN  
 midazolam (VERSED) injection 1 mg  1 mg IntraVENous Q1H PRN  chlorhexidine (PERIDEX) 0.12 % mouthwash 10 mL  10 mL Oral BID  morphine 10 mg/ml injection 4 mg  4 mg IntraVENous Q1H PRN  Or  
  morphine 10 mg/ml injection 5 mg  5 mg IntraVENous Q1H PRN  
 dextrose 5 % - 0.45% NaCl infusion  25 mL/hr IntraVENous CONTINUOUS  
 vasopressin (VASOSTRICT) 20 Units in 0.9% sodium chloride 100 mL infusion  0-0.1 Units/min IntraVENous TITRATE  PHENYLephrine (PF)(LEWIS-SYNEPHRINE) 30 mg in 0.9% sodium chloride 250 mL infusion   mcg/min IntraVENous TITRATE  sodium bicarbonate (8.4%) injection 50 mEq  50 mEq IntraVENous PRN Data Review:  
 
LABS:  
Recent Results (from the past 12 hour(s)) GLUCOSE, POC Collection Time: 01/23/20 11:37 PM  
Result Value Ref Range Glucose (POC) 126 (H) 65 - 100 mg/dL MAGNESIUM Collection Time: 01/24/20  4:46 AM  
Result Value Ref Range Magnesium 2.1 1.8 - 2.4 mg/dL METABOLIC PANEL, BASIC Collection Time: 01/24/20  4:46 AM  
Result Value Ref Range Sodium 141 136 - 145 mmol/L Potassium 5.0 3.5 - 5.1 mmol/L Chloride 104 98 - 107 mmol/L  
 CO2 30 21 - 32 mmol/L Anion gap 7 7 - 16 mmol/L Glucose 213 (H) 65 - 100 mg/dL BUN 22 8 - 23 MG/DL Creatinine 2.19 (H) 0.8 - 1.5 MG/DL  
 GFR est AA 38 (L) >60 ml/min/1.73m2 GFR est non-AA 32 (L) >60 ml/min/1.73m2 Calcium 8.2 (L) 8.3 - 10.4 MG/DL  
PTT Collection Time: 01/24/20  4:46 AM  
Result Value Ref Range aPTT 50.6 (H) 24.7 - 39.8 SEC PHOSPHORUS Collection Time: 01/24/20  4:46 AM  
Result Value Ref Range Phosphorus 5.4 (H) 2.3 - 3.7 MG/DL  
POC VENOUS BLOOD GAS Collection Time: 01/24/20  4:46 AM  
Result Value Ref Range Device: VENT    
 FIO2 (POC) 50 %  
 pH, venous (POC) 7.424 (H) 7.32 - 7.42    
 pCO2, venous (POC) 44.3 41 - 51 MMHG  
 pO2, venous (POC) 44 mmHg HCO3, venous (POC) 29.0 (H) 23 - 28 MMOL/L  
 sO2, venous (POC) 81 65 - 88 % Base excess, venous (POC) 4 mmol/L Mode ASSIST CONTROL Set Rate 15 bpm  
 PEEP/CPAP (POC) 8 cmH2O Allens test (POC) YES Site RIGHT RADIAL Specimen type (POC) VENOUS BLOOD Performed by Micky   
 CO2, POC 30 MMOL/L Pressure control 20 Respiratory comment: NurseNotified Exhaled minute volume 8.80 L/min COLLECT TIME 440 CBC W/O DIFF Collection Time: 01/24/20  4:50 AM  
Result Value Ref Range WBC 17.7 (H) 4.3 - 11.1 K/uL  
 RBC 3.28 (L) 4.23 - 5.6 M/uL  
 HGB 10.0 (L) 13.6 - 17.2 g/dL HCT 32.2 (L) 41.1 - 50.3 % MCV 98.2 (H) 79.6 - 97.8 FL  
 MCH 30.5 26.1 - 32.9 PG  
 MCHC 31.1 (L) 31.4 - 35.0 g/dL  
 RDW 15.5 (H) 11.9 - 14.6 % PLATELET 65 (L) 341 - 450 K/uL MPV 12.6 (H) 9.4 - 12.3 FL ABSOLUTE NRBC 0.02 0.0 - 0.2 K/uL GLUCOSE, POC Collection Time: 01/24/20  6:43 AM  
Result Value Ref Range Glucose (POC) 223 (H) 65 - 100 mg/dL Plan:  
 
Principal Problem: 
  CAD (coronary artery disease) (1/10/2020) Active Problems: Aortic valve stenosis (1/10/2020) Weaning from respirator [facility]) (1/10/2020) Acute hypoxemic respiratory failure ([address] (1/10/2020) S/P CABG x 3 (1/10/2020) S/P AVR (1/10/2020) Acute renal failure (ARF) ([address] (1/11/2020) Atrial fibrillation ([address] (1/13/2020) Shock ([address] (1/15/2020) Bilateral pneumothorax (1/17/2020) Thrombocytopenia ([address] (1/17/2020) HIT (heparin-induced thrombocytopenia) ([address] (1/23/2020) Acute oon CKD - Renal US unremarkable. 
  
ASHD/ AS - s/p CABG  Aortic Valve replacement 
  
DM 
  
HTN/ Volume - UF as tolerated 
  
Resp Failure - Hypoxic Resp failure from pulmonary edema on vent. 
  
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 
 
Still intubated. Going for pacemaker today. extubation held. Discussed with nursing. May not go for pacemaker until 5PM.  Will plan on SLED this AM until procedure. Addendum: 
The patient was seen on dialysis at 4:25 PM .  BP is stable. Catheter is functioning well.   Will continue treatment through the night for UF

## 2020-01-24 NOTE — PROGRESS NOTES
Ventilator check complete; patient has a #8. 0 ET tube secured at the 25 at the lip. Patient is sedated. Patient is not able to follow commands. Breath sounds are coarse. Trachea is midline, Negative for subcutaneous air, and chest excursion is symmetric. Patient is also Negative for cyanosis and is Negative for pitting edema. All alarms are set and audible. Resuscitation bag is at the head of the bed. Ventilator Settings Mode FIO2 Rate Tidal Volume Pressure PEEP I:E Ratio Pressure control  49 %    450 ml  8 cm H2O  8 cm H20  1:3.33 Peak airway pressure: 28.2 cm H2O Minute ventilation: 13.5 l/min ABG: No results for input(s): PH, PCO2, PO2, HCO3 in the last 72 hours.  
 
 
Jared Posada, RT

## 2020-01-24 NOTE — PROGRESS NOTES
Ventilator check complete; patient has a #8. 0 ET tube secured at the 25 at the lip. Patient is not sedated. Patient is able to follow commands. Breath sounds are coarse and diminished. Trachea is midline, Negative for subcutaneous air, and chest excursion is symmetric. Patient is also Negative for cyanosis and is Negative for pitting edema. All alarms are set and audible. Resuscitation bag is at the head of the bed. Ventilator Settings Mode FIO2 Rate Tidal Volume Pressure PEEP I:E Ratio Pressure control  50 %   15  15 cm H20  8 cm H20  1:3.33 Peak airway pressure: 28.6 cm H2O Minute ventilation: 9.7 l/min ABG:  
 
Chato Desouza, RT

## 2020-01-24 NOTE — PROGRESS NOTES
Feedings off at midnight for PPM this am. NGT clamped. CRRT stopped at 0020 completed his 12 hour run. Repositioned.

## 2020-01-24 NOTE — PROGRESS NOTES
TRANSFER - OUT REPORT: 
 
Verbal report given to Maegan DOBSON(name) on iWlda Castano  being transferred to [facility](unit) for ordered procedure Report consisted of patients Situation, Background, Assessment and  
Recommendations(SBAR). Information from the following report(s) SBAR, Kardex, OR Summary, Procedure Summary, Intake/Output, MAR, Recent Results, Med Rec Status and Cardiac Rhythm NSR was reviewed with the receiving nurse. Lines:  
Double Lumen 01/10/20 Right Internal jugular (Active) Central Line Being Utilized Yes 1/24/2020 11:01 AM  
Criteria for Appropriate Use Hemodynamically unstable, requiring monitoring lines, vasopressors, or volume resuscitation 1/24/2020 11:01 AM  
Site Assessment Clean, dry, & intact 1/24/2020 11:01 AM  
Infiltration Assessment 0 1/24/2020 11:01 AM  
Affected Extremity/Extremities Color distal to insertion site pink (or appropriate for race); Pulses palpable 1/24/2020 11:01 AM  
Date of Last Dressing Change 01/24/20 1/24/2020  7:03 AM  
Dressing Status Clean, dry, & intact 1/24/2020 11:01 AM  
Dressing Type Disk with Chlorhexadine gluconate (CHG); Transparent 1/24/2020 11:01 AM  
Action Taken Open ports on tubing capped 1/24/2020 11:01 AM  
Proximal Hub Color/Line Status White; Infusing 1/24/2020 11:01 AM  
Positive Blood Return (Medial Site) Yes 1/24/2020 11:01 AM  
Distal Hub Color/Line Status Brown;Flushed;Patent 1/24/2020 11:01 AM  
Positive Blood Return (Lateral Site) Yes 1/24/2020 11:01 AM  
Alcohol Cap Used No 1/23/2020  3:00 AM  
   
Peripheral IV 01/24/20 Right Forearm (Active) Site Assessment Clean, dry, & intact 1/24/2020 11:01 AM  
Phlebitis Assessment 0 1/24/2020 11:01 AM  
Infiltration Assessment 0 1/24/2020 11:01 AM  
Dressing Status Clean, dry, & intact 1/24/2020 11:01 AM  
Dressing Type Transparent 1/24/2020 11:01 AM  
Hub Color/Line Status Pink;Flushed;Patent 1/24/2020 11:01 AM  
Action Taken Open ports on tubing capped 1/24/2020 11:01 AM  
  
 
 Opportunity for questions and clarification was provided. Patient transported with: 
 Monitor O2 @ 15 liters Registered Nurse Tech

## 2020-01-24 NOTE — DIALYSIS
12 hour SED initiated via left perm cath without difficulty. Bilateral lumen aspirated and flushed with 9 cc NS. Machine orders reviewed with primary RN, , , . No Heparin, using citrasate solution. Machine tests passed and alarms intact. On call dialysis RN available as needed.

## 2020-01-24 NOTE — PROCEDURES
Cardiac Catheterization Procedure Note pacer/loop Patient ID: 
 
 Name: Jim Gómez Medical Record Number: [id number] YOB: 1945 Date of Procedure: 1/24/2020 Physician: Destiney Contreras MD 
 
Referring lesia Indications: This is a 69 yrs male who presents with irreversible symptomatic bradycardia. Pre procedure dx HB Post procedure dx Placement of dual chamber biotronik MRI  compatible system Blood loss less than 5 ml Sedation. Pt received 2 mg versed and 0 mcg fentanyl for monitored conscious sedation from 1:40 to 2:15. Specimen: None No complications No assistants Time out, Mallampati, and ASA performed Procedure: right pectoral region was cleaned and prepped in a sterile fashion. Lidocaine was used for local anesthesia. Modified Seldinger technique was used to gain access to the subclavian vein. Pacer pocket was made with sharp and blunt dissection. Sheaths were placed which allowed for placement of a dual chamber device. After appropriate placement of leads the sheaths were removed and leads were attached to the pre pectoral fascia. The pocket was flushed with antibiotic solution. The device was attached to the appropriate leads and set screws were tightened. Closure was then performored with 2.0 V Jaqueline and 3.0 V Jaqueline to close the skin. Retention suture was not used to secure pulse generator to the pectoral fascia All counts were correct Dressing was applied to the skin Pulse generator was a EDORA 8 DRT serial number [id number] . Right Atrial lead was a SOLIA S45 serial number [id number] Threshold 2.2 V @ 1.0 ms A wave 1.5mV Impedence 468 ohms Right Ventricular lead was a SOLIA S53 serial number [id number] Threshold 1.7 V @ 0.4 ms V wave 10.4 mV [id number] ohms Settings DDD-CLS  Family and our significant other were sought out and discussion of the procedure and findings took place. Procedure and findings including pertinent sequele were discussed with the patient immediately post procedure. Opportunity to ask questions was offered. If no one was available in the post procedure waiting area, I can be reached thru paging system or at [phone].

## 2020-01-25 NOTE — PROGRESS NOTES
RT at bedside. Pt able to meet requirements for extubation (physical mechanics and NIF). Procedure explained to Pt. Pt nodded understanding. VSS. Pt extubated at this time. Mouth and throat suctioned as Pt coughed to removed all secretions. BBS , no stridor noted. Pt able to verbalize name and cough effectively. Pt tolerated procedure well, no acute distress noted. Pt placed on 50 l min 45 % fio2  via nasal prongs , O2 sat 97%. Will continue to monitor.

## 2020-01-25 NOTE — PROGRESS NOTES
CRRT treatment restarted via  [facility] Catheter with out difficulty. Both ports aspirated and flushed without difficulty. VSS, no distress noted continue to monitor.

## 2020-01-25 NOTE — PROGRESS NOTES
More Garcia Admission Date: 1/10/2020 Daily Progress Note: 1/25/2020 The patient's chart is reviewed and the patient is discussed with the staff. Patient had CABG x 3 and AVR on 1/10.  Slow to improve. Had small ptx that self resolved. 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 Subjective:  
 
Patient day 14 on vent- currently on PC Awake Low dose precedex gtt Also on CRRT Current Facility-Administered Medications Medication Dose Route Frequency  ondansetron (ZOFRAN) injection 4 mg  4 mg IntraVENous Q6H PRN  
 midazolam (VERSED) injection 0.5-2 mg  0.5-2 mg IntraVENous Multiple  clindamycin (CLEOCIN) 900mg D5W 50mL IVPB (premix)  900 mg IntraVENous Q8H  
 fentaNYL in normal saline (pf) 25 mcg/mL infusion   mcg/hr IntraVENous TITRATE  insulin lispro (HUMALOG) injection   SubCUTAneous Q6H  
 DOPamine (INTROPIN) 800 mg in dextrose 5% 250 mL infusion  2-20 mcg/kg/min IntraVENous TITRATE  famotidine (PEPCID) 40 mg/5 mL (8 mg/mL) oral suspension 20 mg  20 mg Per NG tube DAILY  amiodarone (CORDARONE) 450 mg in dextrose 5% 250 mL infusion  0.5-1 mg/min IntraVENous TITRATE  dexmedeTOMidine (PRECEDEX) 400 mcg in 0.9% sodium chloride 100 mL infusion  0.2-0.7 mcg/kg/hr IntraVENous TITRATE  amiodarone (CORDARONE) tablet 400 mg  400 mg Per NG tube Q12H  
 epoetin maritza-epbx (RETACRIT) 14,000 Units combo injection  14,000 Units SubCUTAneous Q7D  
 argatroban 50 mg in 0.9% sodium chloride 50 mL (1000 mcg/mL) infusion  0.5-10 mcg/kg/min IntraVENous TITRATE  alcohol 62% (NOZIN) nasal  1 Ampule  1 Ampule Topical Q12H  
 NUTRITIONAL SUPPORT ELECTROLYTE PRN ORDERS   Does Not Apply PRN  
 NOREPINephrine (LEVOPHED) 16,000 mcg in dextrose 5% 250 mL infusion 0. 05-0.2 mcg/kg/min IntraVENous TITRATE  albuterol (PROVENTIL VENTOLIN) nebulizer solution 2.5 mg  2.5 mg Nebulization QID PRN  
 midazolam (VERSED) 100 mg in 0.9% sodium chloride 100 mL infusion  0-10 mg/hr IntraVENous TITRATE  acetaminophen (TYLENOL) solution 650 mg  650 mg Per NG tube Q4H PRN  
 0.9% sodium chloride infusion  25 mL/hr IntraVENous CONTINUOUS  
 sodium chloride (NS) flush 5-40 mL  5-40 mL IntraVENous Q8H  
 sodium chloride (NS) flush 5-40 mL  5-40 mL IntraVENous PRN  
 oxyCODONE-acetaminophen (PERCOCET) 5-325 mg per tablet 1 Tab  1 Tab Oral Q4H PRN  
 morphine 10 mg/ml injection 3 mg  3 mg IntraVENous Q1H PRN  
 naloxone (NARCAN) injection 0.4 mg  0.4 mg IntraVENous PRN  
 EPINEPHrine (ADRENALIN) 4 mg in 0.9% sodium chloride 250 mL infusion  0.04-0.1 mcg/kg/min IntraVENous TITRATE  [Held by provider] metoprolol tartrate (LOPRESSOR) tablet 25 mg  25 mg Oral Q12H  
 atorvastatin (LIPITOR) tablet 80 mg  80 mg Oral QHS  insulin regular (NOVOLIN R, HUMULIN R) 100 Units in 0.9% sodium chloride 100 mL infusion  1 Units/hr IntraVENous TITRATE  dextrose (D50W) injection syrg 12.5 g  25 mL IntraVENous PRN  
 [Held by provider] aspirin chewable tablet 81 mg  81 mg Oral DAILY  magnesium sulfate 1 g/100 ml IVPB (premix or compounded)  1 g IntraVENous PRN  
 midazolam (VERSED) injection 1 mg  1 mg IntraVENous Q1H PRN  chlorhexidine (PERIDEX) 0.12 % mouthwash 10 mL  10 mL Oral BID  morphine 10 mg/ml injection 4 mg  4 mg IntraVENous Q1H PRN Or  
 morphine 10 mg/ml injection 5 mg  5 mg IntraVENous Q1H PRN  
 dextrose 5 % - 0.45% NaCl infusion  25 mL/hr IntraVENous CONTINUOUS  
 vasopressin (VASOSTRICT) 20 Units in 0.9% sodium chloride 100 mL infusion  0-0.1 Units/min IntraVENous TITRATE  PHENYLephrine (PF)(LEWIS-SYNEPHRINE) 30 mg in 0.9% sodium chloride 250 mL infusion   mcg/min IntraVENous TITRATE  sodium bicarbonate (8.4%) injection 50 mEq  50 mEq IntraVENous PRN Review of Systems Unobtainable due to patient status. Objective:  
 
Vitals:  
 01/25/20 0515 01/25/20 0529 01/25/20 0545 01/25/20 0600 BP: (!) 85/53 (!) 85/53 117/55 97/54 Pulse: 65 64 73 66 Resp: 20 19 28 19 Temp:      
SpO2: 100% 100% 100% 100% Weight:      
Height:      
 
Intake and Output:  
01/23 0701 - 01/24 1900 In: 1959.8 [I.V.:1253.8] Out: 7521 [Urine:15; Drains:150] 01/24 1901 - 01/25 0700 In: 632.5 [I.V.:350.5] Out: 3578 [Urine:20; Drains:100] Physical Exam:  
Constitution:  the patient is well developed and in moderate respiratory distress EENMT:  Sclera clear, pupils equal, oral mucosa moist 
Respiratory: ETT in place. Distant but clear. Cardiovascular:  RRR without M,G,R 
Gastrointestinal: soft and non-tender; with positive bowel sounds. Musculoskeletal: warm without cyanosis. There is 1+ B lower leg edema. Skin:  no jaundice or rashes, surgical wounds Neurologic: no gross neuro deficits Psychiatric:  alert and nodding to questions CHEST XRAY:  
1/23/20 IMPRESSION: 
Stable abnormal exam with tubes and lines as described above Atherosclerosis LAB No lab exists for component: [place] Recent Labs  
  01/25/20 
0325 01/24/20 
0450 01/23/20 0329 01/22/20 
1515 WBC 16.5* 17.7* 13.1*  --   
HGB 8.8* 10.0* 9.5* 8.7* HCT 29.0* 32.2* 30.0* 27.8* PLT 43* 65* 47*  --   
 
Recent Labs  
  01/25/20 0325 01/24/20 
0446 01/23/20 1954 01/23/20 
0321  141  --  141  
K 4.3 5.0 4.7 4.0  
 104  --  105 CO2 33* 30  --  31  
* 213*  --  108* BUN 17 22  --  23  
CREA 1.67* 2.19*  --  2.24* MG 1.8 2.1 1.7*  --   
CA 7.7* 8.2*  --  7.8* PHOS  --  5.4*  --   --   
 
ABG:   
Lab Results Component Value Date/Time  PHI 7.338 (L) 01/25/2020 03:35 AM  
 PCO2I 57.3 (H) 01/25/2020 03:35 AM  
 PO2I 61 (L) 01/25/2020 03:35 AM  
 HCO3I 30.8 (H) 01/25/2020 03:35 AM  
 FIO2I 50 01/25/2020 03:35 AM  
 
 
 
Assessment:  (Medical Decision Making) Hospital Problems  Date Reviewed: 12/6/2019 Codes Class Noted POA  
 HIT (heparin-induced thrombocytopenia) ([facility]) ICD-10-CM: Z05.72 ICD-9-CM: 289.84  1/23/2020 Unknown Bilateral pneumothorax ICD-10-CM: J93.9 ICD-9-CM: 512.89  1/17/2020 Unknown Thrombocytopenia ([facility] [address] ICD-10-CM: D69.6 ICD-9-CM: 287.5  1/17/2020 Unknown Shock ([facility] [address] ICD-10-CM: R57.9 ICD-9-CM: 785.50  1/15/2020 Unknown Atrial fibrillation [facility]) ICD-10-CM: I48.91 
ICD-9-CM: 427.31  1/13/2020 Unknown Acute renal failure (ARF) ([facility]) ICD-10-CM: N17.9 ICD-9-CM: 584.9  1/11/2020 Unknown Aortic valve stenosis ICD-10-CM: I35.0 ICD-9-CM: 424.1  1/10/2020 Unknown * (Principal) CAD (coronary artery disease) ICD-10-CM: I25.10 ICD-9-CM: 414.00  1/10/2020 Unknown Weaning from respirator [facility]) ICD-10-CM: Z99.11 ICD-9-CM: V46.13  1/10/2020 Unknown Acute hypoxemic respiratory failure ([facility] [address] ICD-10-CM: J96.01 
ICD-9-CM: 518.81  1/10/2020 S/P CABG x 3 ICD-10-CM: Z95.1 ICD-9-CM: V45.81  1/10/2020 Unknown S/P AVR ICD-10-CM: Z95.2 ICD-9-CM: V43.3  1/10/2020 Unknown Patient with prolonged intubation s/p cabg and avr. Current indications are that he would likely fail extubation with his severe tachypnea and low tidal volumes. HIT + both on ROSA and SHARON. LE doppler with B lower extremity clot. Renal failure so unable to perform CT PE. Plan:  (Medical Decision Making)  
- on PC currently - will change to PS of 10 and then repeat ABG and see how he does whether we can extubate him to BIPAP 
- continue CRRT 
- cont argatroban for HIT Deangelo Sapmson MD

## 2020-01-25 NOTE — PROGRESS NOTES
POD 15 Days Post-Op Procedure:  Procedure(s): CORONARY ARTERY BYPASS GRAFT WITH AVR/ (CABG X 3 )/ LIMA VEIN HARVEST/ GREATER SAPHENOUS 
ESOPHAGEAL TRANS ECHOCARDIOGRAM 
 
 
Subjective:  
 
Extubated, no complaint, feels a little SOB Objective:  
 
Patient Vitals for the past 8 hrs: 
 BP Temp Pulse Resp SpO2 Weight  
20 1033     93 %   
20 0859 118/59  84 28 97 %   
20 0844 104/57  80 20 96 %   
20 0829 112/62  80 23 97 %   
20 0815 125/57  80 25 96 %   
20 0759 92/54  67 10 98 %   
20 0748 (!) 70/47  64 17 100 %   
20 0744 (!) 68/46  64 14 100 %   
20 0740 (!) 68/44  67 18 100 %   
20 0739 (!) 63/43  63 13 100 %   
20 0729 (!) 69/38  66 14 100 %   
20 0715 (!) 76/47  65 23 100 %   
20 0703 (!) 70/43  64 14 100 %   
20 0659 (!) 72/45 97.4 °F (36.3 °C) 63 18 100 %   
20 0600 97/54  66 19 100 %   
20 0545 117/55  73 28 100 %   
20 0529 (!) 85/53  64 19 100 %   
20 0515 (!) 85/53  65 20 100 %   
20 0500 (!) 82/50  68 18 100 %   
20 0442      329 lb 5.9 oz (149.4 kg) 20 0427 (!) 79/50  72 29 99 %   
20 0415 (!) 78/50  71 21 100 %   
20 0400 (!) 81/50  74 14 98 %   
20 0358    17 98 %   
20 0329 107/54  90 (!) 32 92 %   
20 0315 101/57  87 (!) 40 (!) 89 %   
20 0300 108/56 97.2 °F (36.2 °C) 85 (!) 75 97 %   
20 0244 101/55  84 30 96 %  Temp (24hrs), Av °F (36.7 °C), Min:97 °F (36.1 °C), Max:98.8 °F (37.1 °C) Hemodynamics PAP Systolic: 50 PAP 
CO (l/min): 7.5 l/min CO 
CI (l/min/m2): 2.9 l/min/m2 CI 
 
 07 -  1900 In: 290 Out: 196  
1901 -  0700 In: 2592.3 [I.V.:1604.3] Out: 5343 [Urine:25; Drains:250] CT Drainage 
  
  
   
  total of all CT's Heart:  regular rate and rhythm, S1, S2 normal, no murmur, click, rub or gallop Lung:  clear to auscultation bilaterally Neuro: Grossly non focal 
Incisions: Clean, dry, and intact Labs: 
Recent Results (from the past 24 hour(s)) GLUCOSE, POC Collection Time: 01/24/20 12:00 PM  
Result Value Ref Range Glucose (POC) 274 (H) 65 - 100 mg/dL PTT Collection Time: 01/24/20  4:25 PM  
Result Value Ref Range aPTT 40.5 (H) 24.7 - 39.8 SEC  
GLUCOSE, POC Collection Time: 01/24/20  6:21 PM  
Result Value Ref Range Glucose (POC) 227 (H) 65 - 100 mg/dL PTT Collection Time: 01/24/20  7:04 PM  
Result Value Ref Range aPTT 46.5 (H) 24.7 - 39.8 SEC  
PTT Collection Time: 01/24/20 10:54 PM  
Result Value Ref Range aPTT 51.2 (H) 24.7 - 39.8 SEC  
GLUCOSE, POC Collection Time: 01/25/20 12:28 AM  
Result Value Ref Range Glucose (POC) 148 (H) 65 - 100 mg/dL METABOLIC PANEL, BASIC Collection Time: 01/25/20  3:25 AM  
Result Value Ref Range Sodium 142 136 - 145 mmol/L Potassium 4.3 3.5 - 5.1 mmol/L Chloride 106 98 - 107 mmol/L  
 CO2 33 (H) 21 - 32 mmol/L Anion gap 3 (L) 7 - 16 mmol/L Glucose 170 (H) 65 - 100 mg/dL BUN 17 8 - 23 MG/DL Creatinine 1.67 (H) 0.8 - 1.5 MG/DL  
 GFR est AA 52 (L) >60 ml/min/1.73m2 GFR est non-AA 43 (L) >60 ml/min/1.73m2 Calcium 7.7 (L) 8.3 - 10.4 MG/DL  
PTT Collection Time: 01/25/20  3:25 AM  
Result Value Ref Range aPTT 52.7 (H) 24.7 - 39.8 SEC  
CBC W/O DIFF Collection Time: 01/25/20  3:25 AM  
Result Value Ref Range WBC 16.5 (H) 4.3 - 11.1 K/uL  
 RBC 2.93 (L) 4.23 - 5.6 M/uL HGB 8.8 (L) 13.6 - 17.2 g/dL HCT 29.0 (L) 41.1 - 50.3 % MCV 99.0 (H) 79.6 - 97.8 FL  
 MCH 30.0 26.1 - 32.9 PG  
 MCHC 30.3 (L) 31.4 - 35.0 g/dL  
 RDW 15.5 (H) 11.9 - 14.6 % PLATELET 43 (L) 010 - 450 K/uL MPV 12.8 (H) 9.4 - 12.3 FL ABSOLUTE NRBC 0.00 0.0 - 0.2 K/uL MAGNESIUM Collection Time: 01/25/20  3:25 AM  
Result Value Ref Range Magnesium 1.8 1.8 - 2.4 mg/dL POC G3  
 Collection Time: 01/25/20  3:35 AM  
Result Value Ref Range Device: VENT    
 FIO2 (POC) 50 % pH (POC) 7.338 (L) 7.35 - 7.45    
 pCO2 (POC) 57.3 (H) 35 - 45 MMHG  
 pO2 (POC) 61 (L) 75 - 100 MMHG  
 HCO3 (POC) 30.8 (H) 22 - 26 MMOL/L  
 sO2 (POC) 89 (L) 95 - 98 % Base excess (POC) 4 mmol/L Mode ASSIST CONTROL Set Rate 15 bpm  
 PEEP/CPAP (POC) 8 cmH2O Allens test (POC) YES Inspiratory Time 1.33 sec Site RIGHT RADIAL Specimen type (POC) ARTERIAL Performed by Jaun   
 CO2, POC 32 MMOL/L Pressure control 12 Respiratory comment: NurseNotified Exhaled minute volume 9.00 L/min COLLECT TIME 335 GLUCOSE, POC Collection Time: 01/25/20  6:07 AM  
Result Value Ref Range Glucose (POC) 165 (H) 65 - 100 mg/dL POC G3 Collection Time: 01/25/20  8:02 AM  
Result Value Ref Range Device: VENT    
 FIO2 (POC) 50 % pH (POC) 7.420 7.35 - 7.45    
 pCO2 (POC) 46.6 (H) 35 - 45 MMHG  
 pO2 (POC) 84 75 - 100 MMHG  
 HCO3 (POC) 30.2 (H) 22 - 26 MMOL/L  
 sO2 (POC) 96 95 - 98 % Base excess (POC) 5 mmol/L Mode VENTILATOR/SPONTANEOUS/PRESSURE SUPPORT    
 PEEP/CPAP (POC) 8 cmH2O Pressure support 10 cmH2O Allens test (POC) YES Total resp. rate 21 Site LEFT RADIAL Specimen type (POC) ARTERIAL Performed by NannetteRT   
 CO2, POC 32 MMOL/L Respiratory comment: NurseNotified COLLECT TIME 800 Assessment:  
 
Principal Problem: 
  CAD (coronary artery disease) (1/10/2020) Active Problems: Aortic valve stenosis (1/10/2020) Weaning from respirator [facility]) (1/10/2020) Acute hypoxemic respiratory failure ([address] (1/10/2020) S/P CABG x 3 (1/10/2020) S/P AVR (1/10/2020) Acute renal failure (ARF) ([address] (1/11/2020) Atrial fibrillation ([address] (1/13/2020) Shock ([facility] [address] (1/15/2020) Bilateral pneumothorax (1/17/2020) Thrombocytopenia ([facility] [address] (1/17/2020) HIT (heparin-induced thrombocytopenia) ([facility] [address] (1/23/2020) Plan/Recommendations/Medical Decision Making: NSR with PAC's, CRRT, pulm toilet, ice chips later See orders

## 2020-01-25 NOTE — PROGRESS NOTES
Dr. Acosta Oh notified of patient's respiratory status. NIF -27, RSBI 89.87, rr-32/min tidal volume 327. Minimal leak test, airflow auscultated around balloon. ABG WDL. Per MD, proceed with extubation. [place] scope is at bedside, racemic epinephrine is at bedside, Ambu-BAG and suction are at bedside, emergency respiratory box is at bedside, John Carver and Elijah Molina with RT dept to bedside as well. Dr. Liya Tapia stated he will check on patient and to proceed with extubation.

## 2020-01-25 NOTE — PROGRESS NOTES
am 
1/25/2020 8:35 AM 
 
Admit Date: 1/10/2020 Admit Diagnosis: Atherosclerosis of native coronary artery of native heart with unstable angina pectoris ([address] [I25.110]; Nonrheumatic aortic valve stenosis [I35.0];CAD (coronary artery disease) [I25.10]; Aortic valve stenosis [I35.0] Subjective:  
 Patient sp ppm normal function this am. 
 
Objective:  
  
Visit Vitals /57 Pulse 80 Temp 97.4 °F (36.3 °C) Resp 25 Ht 5' 10\" (1.778 m) Wt 149.4 kg (329 lb 5.9 oz) SpO2 96% BMI 47.26 kg/m² ROS: intubated but responsive Physical Exam: 
 
Physical Examination: General appearance - alert, well appearing, and in no distress Mental status - alert, oriented to person, place, and time Eyes - pupils equal and reactive, extraocular eye movements intact Neck/lymph - supple, no significant adenopathy Chest/CV - clear to auscultation, no wheezes, rales or rhonchi, symmetric air entry Heart - normal rate, regular rhythm, normal S1, S2, no murmurs, rubs, clicks or gallops Abdomen/GI - soft, nontender, nondistended, no masses or organomegaly Musculoskeletal - no joint tenderness, deformity or swelling Extremities - peripheral pulses normal, no pedal edema, no clubbing or cyanosis Skin - normal coloration and turgor, no rashes, no suspicious skin lesions noted Current Facility-Administered Medications Medication Dose Route Frequency  ondansetron (ZOFRAN) injection 4 mg  4 mg IntraVENous Q6H PRN  
 midazolam (VERSED) injection 0.5-2 mg  0.5-2 mg IntraVENous Multiple  fentaNYL in normal saline (pf) 25 mcg/mL infusion   mcg/hr IntraVENous TITRATE  insulin lispro (HUMALOG) injection   SubCUTAneous Q6H  
 DOPamine (INTROPIN) 800 mg in dextrose 5% 250 mL infusion  2-20 mcg/kg/min IntraVENous TITRATE  famotidine (PEPCID) 40 mg/5 mL (8 mg/mL) oral suspension 20 mg  20 mg Per NG tube DAILY  amiodarone (CORDARONE) 450 mg in dextrose 5% 250 mL infusion  0.5-1 mg/min IntraVENous TITRATE  dexmedeTOMidine (PRECEDEX) 400 mcg in 0.9% sodium chloride 100 mL infusion  0.2-0.7 mcg/kg/hr IntraVENous TITRATE  amiodarone (CORDARONE) tablet 400 mg  400 mg Per NG tube Q12H  
 epoetin maritza-epbx (RETACRIT) 14,000 Units combo injection  14,000 Units SubCUTAneous Q7D  
 argatroban 50 mg in 0.9% sodium chloride 50 mL (1000 mcg/mL) infusion  0.5-10 mcg/kg/min IntraVENous TITRATE  alcohol 62% (NOZIN) nasal  1 Ampule  1 Ampule Topical Q12H  
 NUTRITIONAL SUPPORT ELECTROLYTE PRN ORDERS   Does Not Apply PRN  
 NOREPINephrine (LEVOPHED) 16,000 mcg in dextrose 5% 250 mL infusion  0.05-0.2 mcg/kg/min IntraVENous TITRATE  albuterol (PROVENTIL VENTOLIN) nebulizer solution 2.5 mg  2.5 mg Nebulization QID PRN  
 midazolam (VERSED) 100 mg in 0.9% sodium chloride 100 mL infusion  0-10 mg/hr IntraVENous TITRATE  acetaminophen (TYLENOL) solution 650 mg  650 mg Per NG tube Q4H PRN  
 0.9% sodium chloride infusion  25 mL/hr IntraVENous CONTINUOUS  
 sodium chloride (NS) flush 5-40 mL  5-40 mL IntraVENous Q8H  
 sodium chloride (NS) flush 5-40 mL  5-40 mL IntraVENous PRN  
 oxyCODONE-acetaminophen (PERCOCET) 5-325 mg per tablet 1 Tab  1 Tab Oral Q4H PRN  
 morphine 10 mg/ml injection 3 mg  3 mg IntraVENous Q1H PRN  
 naloxone (NARCAN) injection 0.4 mg  0.4 mg IntraVENous PRN  
 EPINEPHrine (ADRENALIN) 4 mg in 0.9% sodium chloride 250 mL infusion  0.04-0.1 mcg/kg/min IntraVENous TITRATE  [Held by provider] metoprolol tartrate (LOPRESSOR) tablet 25 mg  25 mg Oral Q12H  
 atorvastatin (LIPITOR) tablet 80 mg  80 mg Oral QHS  insulin regular (NOVOLIN R, HUMULIN R) 100 Units in 0.9% sodium chloride 100 mL infusion  1 Units/hr IntraVENous TITRATE  dextrose (D50W) injection syrg 12.5 g  25 mL IntraVENous PRN  
 [Held by provider] aspirin chewable tablet 81 mg  81 mg Oral DAILY  magnesium sulfate 1 g/100 ml IVPB (premix or compounded)  1 g IntraVENous PRN  
  midazolam (VERSED) injection 1 mg  1 mg IntraVENous Q1H PRN  chlorhexidine (PERIDEX) 0.12 % mouthwash 10 mL  10 mL Oral BID  morphine 10 mg/ml injection 4 mg  4 mg IntraVENous Q1H PRN Or  
 morphine 10 mg/ml injection 5 mg  5 mg IntraVENous Q1H PRN  
 dextrose 5 % - 0.45% NaCl infusion  25 mL/hr IntraVENous CONTINUOUS  
 vasopressin (VASOSTRICT) 20 Units in 0.9% sodium chloride 100 mL infusion  0-0.1 Units/min IntraVENous TITRATE  PHENYLephrine (PF)(LEWIS-SYNEPHRINE) 30 mg in 0.9% sodium chloride 250 mL infusion   mcg/min IntraVENous TITRATE  sodium bicarbonate (8.4%) injection 50 mEq  50 mEq IntraVENous PRN Data Review:  
@LABRCNT(Na,K,BUN,CREA,WBC,HGB,HCT,PLT,INR,TRP,TCHOL*,Triglyceride*,LDL*,LDLCPOC HDL*,HDL])@ TELEMETRY: nsr Assessment/Plan:  
 
Principal Problem: 
  CAD (coronary artery disease) (1/10/2020) Active Problems: Aortic valve stenosis (1/10/2020)sp cabg avr 
 
  Weaning from respirator ([address] (1/10/2020) Acute hypoxemic respiratory failure ([address] (1/10/2020) S/P CABG x 3 (1/10/2020) S/P AVR (1/10/2020) Acute renal failure (ARF) ([address] (1/11/2020)monitor Atrial fibrillation ([address] (1/13/2020) Shock ([address] (1/15/2020) Bilateral pneumothorax (1/17/2020) Thrombocytopenia ([address] (1/17/2020) HIT (heparin-induced thrombocytopenia) ([address] (1/23/2020) SP PPM for 2:1 heart block. Normal function. Anticipate weaning for trial off vent Call if needed Krista Cardoso MD

## 2020-01-25 NOTE — DIALYSIS
SLED continued using  Left CVC. Aspirated and flushed both catheter ports without problem. Machine settings per MD order. 200  DFR  300 UFR using 4k citrasate. Heparin 0 unit bolus and 0 ml/hr. Reported to primary nurse. Dialysis nurse available as needed.

## 2020-01-25 NOTE — PROGRESS NOTES
Respiratory Mechanics completed and are as follows: 
RR32, , NIF-27, RSBI 89.87 Patient extubated to a AIRVO 50L 45% NC. Patient is able to communicate and is negative for stridor. Breath sounds are coarse. No complications with extubation.   
 
Jalen Posada, RT

## 2020-01-25 NOTE — PROGRESS NOTES
Patient with increased work of breathing, tachypnea and noticeable retractions. Tidal volumes on vent in 300's, less than normal. Pressure control setting increased to 20 to achieve volumes in 500's. Patient immediately showing signs of relief and normal respirations per minute after change.

## 2020-01-25 NOTE — PROGRESS NOTES
Renal Progress Note Admission Date: 1/10/2020 Subjective: The patient was seen on dialysis at 7:54 AM . Catheter is functioning well. BP has been consistently low. UF rate turned down to 50ml/hour Objective:  
 
Physical Exam:   
Patient Vitals for the past 8 hrs: 
 BP Temp Pulse Resp SpO2 Weight  
01/25/20 0659 (!) 72/45 97.4 °F (36.3 °C) 63 18 100 %   
01/25/20 0600 97/54  66 19 100 %   
01/25/20 0545 117/55  73 28 100 %   
01/25/20 0529 (!) 85/53  64 19 100 %   
01/25/20 0515 (!) 85/53  65 20 100 %   
01/25/20 0500 (!) 82/50  68 18 100 %   
01/25/20 0442      149.4 kg (329 lb 5.9 oz) 01/25/20 0427 (!) 79/50  72 29 99 %   
01/25/20 0415 (!) 78/50  71 21 100 %   
01/25/20 0400 (!) 81/50  74 14 98 %   
01/25/20 0358    17 98 %   
01/25/20 0329 107/54  90 (!) 32 92 %   
01/25/20 0315 101/57  87 (!) 40 (!) 89 %   
01/25/20 0300 108/56 97.2 °F (36.2 °C) 85 (!) 75 97 %   
01/25/20 0244 101/55  84 30 96 %   
01/25/20 0229 108/59  82 (!) 42 95 %   
01/25/20 0216 123/53  81 (!) 32 97 %   
01/25/20 0201 133/88  88 (!) 38 93 %   
01/25/20 0144 (!) 86/54  67 16 100 %   
01/25/20 0132   66 20 100 %   
01/25/20 0129 (!) 80/47  62 13 100 %   
01/25/20 0115 90/55  61 19 100 %   
01/25/20 0110 96/53  65 20 100 %   
01/25/20 0100 98/53  64 16 100 %   
01/25/20 0044 96/54  64 17 99 %   
01/25/20 0030 92/50  70 25 100 %  Gen: intubated, comfortable HEENT: moist membranes CV: S1, S2 
Lungs: Coarse bilaterally Extem: 2+ edema Current Facility-Administered Medications Medication Dose Route Frequency  ondansetron (ZOFRAN) injection 4 mg  4 mg IntraVENous Q6H PRN  
 midazolam (VERSED) injection 0.5-2 mg  0.5-2 mg IntraVENous Multiple  fentaNYL in normal saline (pf) 25 mcg/mL infusion   mcg/hr IntraVENous TITRATE  insulin lispro (HUMALOG) injection   SubCUTAneous Q6H  
 DOPamine (INTROPIN) 800 mg in dextrose 5% 250 mL infusion  2-20 mcg/kg/min IntraVENous TITRATE  famotidine (PEPCID) 40 mg/5 mL (8 mg/mL) oral suspension 20 mg  20 mg Per NG tube DAILY  amiodarone (CORDARONE) 450 mg in dextrose 5% 250 mL infusion  0.5-1 mg/min IntraVENous TITRATE  dexmedeTOMidine (PRECEDEX) 400 mcg in 0.9% sodium chloride 100 mL infusion  0.2-0.7 mcg/kg/hr IntraVENous TITRATE  amiodarone (CORDARONE) tablet 400 mg  400 mg Per NG tube Q12H  
 epoetin maritza-epbx (RETACRIT) 14,000 Units combo injection  14,000 Units SubCUTAneous Q7D  
 argatroban 50 mg in 0.9% sodium chloride 50 mL (1000 mcg/mL) infusion  0.5-10 mcg/kg/min IntraVENous TITRATE  alcohol 62% (NOZIN) nasal  1 Ampule  1 Ampule Topical Q12H  
 NUTRITIONAL SUPPORT ELECTROLYTE PRN ORDERS   Does Not Apply PRN  
 NOREPINephrine (LEVOPHED) 16,000 mcg in dextrose 5% 250 mL infusion  0.05-0.2 mcg/kg/min IntraVENous TITRATE  albuterol (PROVENTIL VENTOLIN) nebulizer solution 2.5 mg  2.5 mg Nebulization QID PRN  
 midazolam (VERSED) 100 mg in 0.9% sodium chloride 100 mL infusion  0-10 mg/hr IntraVENous TITRATE  acetaminophen (TYLENOL) solution 650 mg  650 mg Per NG tube Q4H PRN  
 0.9% sodium chloride infusion  25 mL/hr IntraVENous CONTINUOUS  
 sodium chloride (NS) flush 5-40 mL  5-40 mL IntraVENous Q8H  
 sodium chloride (NS) flush 5-40 mL  5-40 mL IntraVENous PRN  
 oxyCODONE-acetaminophen (PERCOCET) 5-325 mg per tablet 1 Tab  1 Tab Oral Q4H PRN  
 morphine 10 mg/ml injection 3 mg  3 mg IntraVENous Q1H PRN  
 naloxone (NARCAN) injection 0.4 mg  0.4 mg IntraVENous PRN  
 EPINEPHrine (ADRENALIN) 4 mg in 0.9% sodium chloride 250 mL infusion  0.04-0.1 mcg/kg/min IntraVENous TITRATE  [Held by provider] metoprolol tartrate (LOPRESSOR) tablet 25 mg  25 mg Oral Q12H  
 atorvastatin (LIPITOR) tablet 80 mg  80 mg Oral QHS  insulin regular (NOVOLIN R, HUMULIN R) 100 Units in 0.9% sodium chloride 100 mL infusion  1 Units/hr IntraVENous TITRATE  dextrose (D50W) injection syrg 12.5 g  25 mL IntraVENous PRN  
 [Held by provider] aspirin chewable tablet 81 mg  81 mg Oral DAILY  magnesium sulfate 1 g/100 ml IVPB (premix or compounded)  1 g IntraVENous PRN  
 midazolam (VERSED) injection 1 mg  1 mg IntraVENous Q1H PRN  chlorhexidine (PERIDEX) 0.12 % mouthwash 10 mL  10 mL Oral BID  morphine 10 mg/ml injection 4 mg  4 mg IntraVENous Q1H PRN Or  
 morphine 10 mg/ml injection 5 mg  5 mg IntraVENous Q1H PRN  
 dextrose 5 % - 0.45% NaCl infusion  25 mL/hr IntraVENous CONTINUOUS  
 vasopressin (VASOSTRICT) 20 Units in 0.9% sodium chloride 100 mL infusion  0-0.1 Units/min IntraVENous TITRATE  PHENYLephrine (PF)(LEWIS-SYNEPHRINE) 30 mg in 0.9% sodium chloride 250 mL infusion   mcg/min IntraVENous TITRATE  sodium bicarbonate (8.4%) injection 50 mEq  50 mEq IntraVENous PRN Data Review:  
 
LABS:  
Recent Results (from the past 12 hour(s)) PTT Collection Time: 01/24/20 10:54 PM  
Result Value Ref Range aPTT 51.2 (H) 24.7 - 39.8 SEC  
GLUCOSE, POC Collection Time: 01/25/20 12:28 AM  
Result Value Ref Range Glucose (POC) 148 (H) 65 - 100 mg/dL METABOLIC PANEL, BASIC Collection Time: 01/25/20  3:25 AM  
Result Value Ref Range Sodium 142 136 - 145 mmol/L Potassium 4.3 3.5 - 5.1 mmol/L Chloride 106 98 - 107 mmol/L  
 CO2 33 (H) 21 - 32 mmol/L Anion gap 3 (L) 7 - 16 mmol/L Glucose 170 (H) 65 - 100 mg/dL BUN 17 8 - 23 MG/DL Creatinine 1.67 (H) 0.8 - 1.5 MG/DL  
 GFR est AA 52 (L) >60 ml/min/1.73m2 GFR est non-AA 43 (L) >60 ml/min/1.73m2 Calcium 7.7 (L) 8.3 - 10.4 MG/DL  
PTT Collection Time: 01/25/20  3:25 AM  
Result Value Ref Range aPTT 52.7 (H) 24.7 - 39.8 SEC  
CBC W/O DIFF Collection Time: 01/25/20  3:25 AM  
Result Value Ref Range WBC 16.5 (H) 4.3 - 11.1 K/uL  
 RBC 2.93 (L) 4.23 - 5.6 M/uL HGB 8.8 (L) 13.6 - 17.2 g/dL HCT 29.0 (L) 41.1 - 50.3 % MCV 99.0 (H) 79.6 - 97.8 FL  
 MCH 30.0 26.1 - 32.9 PG  
 MCHC 30.3 (L) 31.4 - 35.0 g/dL  
 RDW 15.5 (H) 11.9 - 14.6 % PLATELET 43 (L) 873 - 450 K/uL MPV 12.8 (H) 9.4 - 12.3 FL ABSOLUTE NRBC 0.00 0.0 - 0.2 K/uL MAGNESIUM Collection Time: 01/25/20  3:25 AM  
Result Value Ref Range Magnesium 1.8 1.8 - 2.4 mg/dL POC G3 Collection Time: 01/25/20  3:35 AM  
Result Value Ref Range Device: VENT    
 FIO2 (POC) 50 % pH (POC) 7.338 (L) 7.35 - 7.45    
 pCO2 (POC) 57.3 (H) 35 - 45 MMHG  
 pO2 (POC) 61 (L) 75 - 100 MMHG  
 HCO3 (POC) 30.8 (H) 22 - 26 MMOL/L  
 sO2 (POC) 89 (L) 95 - 98 % Base excess (POC) 4 mmol/L Mode ASSIST CONTROL Set Rate 15 bpm  
 PEEP/CPAP (POC) 8 cmH2O Allens test (POC) YES Inspiratory Time 1.33 sec Site RIGHT RADIAL Specimen type (POC) ARTERIAL Performed by Jaun   
 CO2, POC 32 MMOL/L Pressure control 12 Respiratory comment: NurseNotified Exhaled minute volume 9.00 L/min COLLECT TIME 335 GLUCOSE, POC Collection Time: 01/25/20  6:07 AM  
Result Value Ref Range Glucose (POC) 165 (H) 65 - 100 mg/dL Plan:  
 
Principal Problem: 
  CAD (coronary artery disease) (1/10/2020) Active Problems: Aortic valve stenosis (1/10/2020) Weaning from respirator [facility]) (1/10/2020) Acute hypoxemic respiratory failure ([address] (1/10/2020) S/P CABG x 3 (1/10/2020) S/P AVR (1/10/2020) Acute renal failure (ARF) ([address] (1/11/2020) Atrial fibrillation ([address] (1/13/2020) Shock ([address] (1/15/2020) Bilateral pneumothorax (1/17/2020) Thrombocytopenia ([facility] (1/17/2020) HIT (heparin-induced thrombocytopenia) ([facility] [address] (1/23/2020) Acute on CKD - Renal US unremarkable. 
  
ASHD/ AS - s/p CABG  Aortic Valve replacement 
  
DM 
  
HTN/ Volume - UF as tolerated 
  
Resp Failure - Hypoxic Resp failure from pulmonary edema on vent. 
  
Thrombocytopenia- HIT positive on Argatroban Has been on SLED overnight. Good metabolic parameters. Woiuld like to continue fluid removal.  Will defer to CT surgery.   Can always stop the treatment and restart later today or tomorrow.

## 2020-01-26 NOTE — PROGRESS NOTES
Problem: Mobility Impaired (Adult and Pediatric) Goal: *Acute Goals and Plan of Care (Insert Text) Description STG: 
(1.)Patient will roll side to side in bed with  MAXIMAL ASSIST within 3-5 day(s). (2.)Patient will move from supine to sit and sit to supine  in bed with  MAXIMAL ASSIST within 3-5 day(s). (3.)Patient will sit edge of bed/chair with STAND BY ASSIST and good balance statically  for 10 minutes within 3-5 day(s). 4.  Patient will perform 1 sets of 10 repetitions of active range of motion exercises for bilateral lower extremity(s) with  cueing within 3-5 day(s). 5.  Patient will tolerate sitting with bed in chair position for 2 hour 2x/day to facilitate tolerance to sitting in chair within 3-5 days. LTG: 
(1.)Patient will move from supine to sit and sit to supine  and roll side to side in bed with  MODERATE ASSIST  within 7-10 day(s). (2.)Patient will transfer from bed to chair and chair to bed with  MAXIMAL ASSIST using the least restrictive device within 7-10 day(s). (3.)Patient will stand with  MINIMAL ASSIST for 2 minutes with the least restrictive device within 7-10 day(s). 4.  Patient will exhibit 3/5 strength B LE's to facilitate increased independence with bed mobility within 7-10 days. Outcome: Progressing Towards Goal 
  
PHYSICAL THERAPY: Initial Assessment, Daily Note, and PM 1/26/2020 INPATIENT: PT Visit Days : 1 Payor: [facility] / Plan: RMIe / Product Type: [employer] /   
  
NAME/AGE/GENDER: He Mancera is a 69 y.o. male PRIMARY DIAGNOSIS: Atherosclerosis of native coronary artery of native heart with unstable angina pectoris ([facility] [address] [I25.110] Nonrheumatic aortic valve stenosis [I35.0] CAD (coronary artery disease) [I25.10] Aortic valve stenosis [I35.0] CAD (coronary artery disease) CAD (coronary artery disease) Procedure(s) (LRB): 
CORONARY ARTERY BYPASS GRAFT WITH AVR/ (CABG X 3 )/ LIMA (N/A) VEIN HARVEST/ GREATER SAPHENOUS (Left) ESOPHAGEAL TRANS ECHOCARDIOGRAM (N/A) 16 Days Post-Op ICD-10: Treatment Diagnosis:  
 Other abnormalities of gait and mobility (R26.89) Precaution/Allergies: 
Heparin; Amoxicillin; Keflex [cephalexin]; and Pcn [penicillins] ASSESSMENT:  
 
Mr. Luther Gil presents supine in bed CVICU. He underwent above surgery on 1/10/20 and was only recently extubated. He lives with his wife and ambulates independently with cane or RW at baseline. Patient is only oriented to self today. He moves all 4 extremities. B UE's grossly 2 to 3/5 strength. B hips grossly 2/5, while knees/ankles grossly 3+ to 4-. Patient requires total assist for all bed mobility. Bed placed in chair position and patient assisted to unsupported sitting. He attempted to stand to RW with total A of 3 but was unable to fully extend hips/knees/trunk. 's to 140's and patient dyspneic. Patient very fatigued after this. Mr. Luther Gil is functioning well below baseline and is therefore appropriate for skilled PT to maximize rehab potential.  
Initiated treatment to include exercises as outlined below. Patient extremely dyspneic and fatigued after tx. This section established at most recent assessment PROBLEM LIST (Impairments causing functional limitations): 
Decreased Strength Decreased ADL/Functional Activities Decreased Transfer Abilities Decreased Ambulation Ability/Technique Decreased Balance Decreased Activity Tolerance Increased Fatigue Increased Shortness of Breath Decreased Knowledge of Precautions INTERVENTIONS PLANNED: (Benefits and precautions of physical therapy have been discussed with the patient.) Balance Exercise Bed Mobility Neuromuscular Re-education/Strengthening Therapeutic Activites Therapeutic Exercise/Strengthening Transfer Training 
education TREATMENT PLAN: Frequency/Duration: twice daily for duration of hospital stay Rehabilitation Potential For Stated Goals: Good REHAB RECOMMENDATIONS (at time of discharge pending progress):   
Placement: It is my opinion, based on this patient's performance to date, that Mr. Mario Segovia may benefit from intensive therapy at a [facility] after discharge due to the functional deficits listed above that are likely to improve with skilled rehabilitation and severe debility. Equipment:  
tbd  
None at this time HISTORY:  
History of Present Injury/Illness (Reason for Referral): 
Patient admitted for above surgery. Past Medical History/Comorbidities:  
Mr. Mario Segovia  has a past medical history of Arthritis, BPH (benign prostatic hyperplasia) (1/14/2013), CAD (coronary artery disease), DM type 2 (diabetes mellitus, type 2) ([facility] [address] (dx 2004), Dyspnea, Gout (1/14/2013), HLD (hyperlipidemia) (1/14/2013), HTN (hypertension), Morbid obesity ([address] (9/3/14), Psychiatric disorder, Rheumatic fever, Seasonal allergic rhinitis, Severe aortic valve stenosis, Thyroid disease, and Unspecified sleep apnea (2016). Mr. Mario Segovia  has a past surgical history that includes hx tonsil and adenoidectomy; hx heart catheterization (12/23/2019); hx orthopaedic (Left); and hx cataract removal (Bilateral, 2012). Social History/Living Environment:  
Home Environment: Private residence One/Two Story Residence: One story Living Alone: No 
Support Systems: Spouse/Significant Other/Partner Patient Expects to be Discharged to[de-identified] Private residence Current DME Used/Available at Home: Cane, straight Prior Level of Function/Work/Activity: 
 
He lives with his wife and ambulates independently with cane or RW at baseline. Number of Personal Factors/Comorbidities that affect the Plan of Care: 3+: HIGH COMPLEXITY EXAMINATION:  
Most Recent Physical Functioning:  
Gross Assessment: PROM: Generally decreased, functional 
Strength: Grossly decreased, non-functional 
         
  
Posture: 
  
Balance: Sitting: Impaired Sitting - Static: Prop sitting Sitting - Dynamic: Poor (constant support) Bed Mobility: 
Rolling: Total assistance Supine to Sit: Total assistance Sit to Supine: Total assistance Scooting: Total assistance Wheelchair Mobility: 
  
Transfers: 
Sit to Stand: Total assistance Stand to Sit: Total assistance Gait: 
  
   
  
Body Structures Involved: 
Heart Lungs Muscles Body Functions Affected: 
Cardio Respiratory Neuromusculoskeletal 
Movement Related Activities and Participation Affected: Mobility Self Care Domestic Life Community, Social and [place] Number of elements that affect the Plan of Care: 4+: HIGH COMPLEXITY CLINICAL PRESENTATION:  
Presentation: Evolving clinical presentation with changing clinical characteristics: MODERATE COMPLEXITY CLINICAL DECISION MAKING:  
[facility] AM-PAC 6 Clicks Basic Mobility Inpatient Short Form How much difficulty does the patient currently have. .. Unable A Lot A Little None 1. Turning over in bed (including adjusting bedclothes, sheets and blankets)? [x] 1   [] 2   [] 3   [] 4  
2. Sitting down on and standing up from a chair with arms ( e.g., wheelchair, bedside commode, etc.)   [x] 1   [] 2   [] 3   [] 4  
3. Moving from lying on back to sitting on the side of the bed? [x] 1   [] 2   [] 3   [] 4 How much help from another person does the patient currently need. .. Total A Lot A Little None 4. Moving to and from a bed to a chair (including a wheelchair)? [x] 1   [] 2   [] 3   [] 4  
5. Need to walk in hospital room? [x] 1   [] 2   [] 3   [] 4  
6. Climbing 3-5 steps with a railing? [x] 1   [] 2   [] 3   [] 4  
© 2007, Trustees of [employer], under license to [employer]. All rights reserved Score:  Initial: 6 Most Recent: X (Date: -- ) Interpretation of Tool:  Represents activities that are increasingly more difficult (i.e. Bed mobility, Transfers, Gait). Medical Necessity: Patient is expected to demonstrate progress in  
strength, range of motion, balance, coordination, functional technique, and activity tolerance  
 to  
decrease assistance required with all functional mobility Julia Lorenzo Reason for Services/Other Comments: 
Patient continues to require skilled intervention due to  
medical complications and patient unable to attend/participate in therapy as expected Julia Lorenzo Use of outcome tool(s) and clinical judgement create a POC that gives a: Questionable prediction of patient's progress: MODERATE COMPLEXITY  
  
 
 
 
TREATMENT:  
(In addition to Assessment/Re-Assessment sessions the following treatments were rendered) Pre-treatment Symptoms/Complaints:   
Pain: Initial:  
Pain Intensity 1: 0  Post Session:  0 Therapeutic Exercise: ( 8 minutes):  Exercises per grid below to improve mobility, strength, and coordination. Required moderate visual and verbal cues to promote proper body alignment and promote proper body breathing techniques. Progressed complexity of movement as indicated. DATE: 1/26/20 Straight leg raise Hip abduct/ adduct X5 AAB Heel slides  X3 AB Hip external/ internal rotation Ankle dorsiflexion/ plantarflexion X5 AB Sitting unsupported x5' Key:  A=active, AA=active assisted, P=passive, B=bilaterally, R=right, L=left Braces/Orthotics/Lines/Etc:  
IV 
brown catheter Wound vac O2 Device: Heated, Hi flow nasal cannula Treatment/Session Assessment:   
Response to Treatment:  cooperative, tachycardic, dyspneic. Interdisciplinary Collaboration:  
Physical Therapist 
Registered Nurse After treatment position/precautions:  
Supine in bed Bed/Chair-wheels locked Call light within reach Nurse at bedside Compliance with Program/Exercises: Will assess as treatment progresses Recommendations/Intent for next treatment session:   \"Next visit will focus on advancements to more challenging activities and reduction in assistance provided\". Total Treatment Duration: PT Patient Time In/Time Out Time In: 7675 Time Out: 1235 [address] Bear Loja, PT, DPT

## 2020-01-26 NOTE — PROGRESS NOTES
Pt CRRT clotted off this am while Dr. Wilmar Winters was at bedside. Verbal order to rinse back and take him of dialysis for the day.

## 2020-01-26 NOTE — PROGRESS NOTES
[place] Check Admission Date: 1/10/2020 Daily Progress Note: 1/26/2020 The patient's chart is reviewed and the patient is discussed with the staff. Patient had CABG x 3 and AVR on 1/10.  Slow to improve. Had small ptx that self resolved. 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 Subjective:  
 
 
Was extubated yesterday afternoon Now on optiflow 50 L /50 % Was on CRRT Current Facility-Administered Medications Medication Dose Route Frequency  ondansetron (ZOFRAN) injection 4 mg  4 mg IntraVENous Q6H PRN  
 busPIRone (BUSPAR) tablet 10 mg  10 mg Oral TID  morphine 10 mg/ml injection 5 mg  5 mg IntraVENous Q4H PRN  
 oxyCODONE-acetaminophen (PERCOCET) 5-325 mg per tablet 1 Tab  1 Tab Per NG tube Q4H PRN  
 amiodarone (CORDARONE) 450 mg in dextrose 5% 250 mL infusion  0.5-1 mg/min IntraVENous TITRATE  traMADol (ULTRAM) tablet 50 mg  50 mg Oral Q6H PRN  
 insulin lispro (HUMALOG) injection   SubCUTAneous Q6H  
 famotidine (PEPCID) 40 mg/5 mL (8 mg/mL) oral suspension 20 mg  20 mg Per NG tube DAILY  amiodarone (CORDARONE) tablet 400 mg  400 mg Per NG tube Q12H  
 epoetin maritza-epbx (RETACRIT) 14,000 Units combo injection  14,000 Units SubCUTAneous Q7D  
 argatroban 50 mg in 0.9% sodium chloride 50 mL (1000 mcg/mL) infusion  0.5-10 mcg/kg/min IntraVENous TITRATE  alcohol 62% (NOZIN) nasal  1 Ampule  1 Ampule Topical Q12H  
 NUTRITIONAL SUPPORT ELECTROLYTE PRN ORDERS   Does Not Apply PRN  
 NOREPINephrine (LEVOPHED) 16,000 mcg in dextrose 5% 250 mL infusion  0.05-0.2 mcg/kg/min IntraVENous TITRATE  albuterol (PROVENTIL VENTOLIN) nebulizer solution 2.5 mg  2.5 mg Nebulization QID PRN  
 acetaminophen (TYLENOL) solution 650 mg  650 mg Per NG tube Q4H PRN  
  sodium chloride (NS) flush 5-40 mL  5-40 mL IntraVENous Q8H  
 sodium chloride (NS) flush 5-40 mL  5-40 mL IntraVENous PRN  
 naloxone (NARCAN) injection 0.4 mg  0.4 mg IntraVENous PRN  
 [Held by provider] metoprolol tartrate (LOPRESSOR) tablet 25 mg  25 mg Oral Q12H  
 atorvastatin (LIPITOR) tablet 80 mg  80 mg Oral QHS  dextrose (D50W) injection syrg 12.5 g  25 mL IntraVENous PRN  
 [Held by provider] aspirin chewable tablet 81 mg  81 mg Oral DAILY  magnesium sulfate 1 g/100 ml IVPB (premix or compounded)  1 g IntraVENous PRN  
 midazolam (VERSED) injection 1 mg  1 mg IntraVENous Q1H PRN  
 sodium bicarbonate (8.4%) injection 50 mEq  50 mEq IntraVENous PRN Review of Systems Unobtainable due to patient status. Objective:  
 
Vitals:  
 01/26/20 0530 01/26/20 3541 01/26/20 0559 01/26/20 3788 BP: 130/62 125/56 128/58 133/59 Pulse: 88 86 87 89 Resp: 26 21 11 26 Temp:      
SpO2: 98% 95% 100% 100% Weight:      
Height:      
 
Intake and Output:  
01/24 1901 - 01/26 0700 In: 2461.3 [P.O.:150; I.V.:786.3] Out: 7159 [Urine:50; PMVKWH:338] 01/26 0701 - 01/26 1900 In: -  
Out: 383 Physical Exam:  
Constitution:  the patient is well developed and in moderate respiratory distress EENMT:  Sclera clear, pupils equal, oral mucosa moist 
Respiratory: ETT in place. Distant but clear. Cardiovascular:  RRR without M,G,R 
Gastrointestinal: soft and non-tender; with positive bowel sounds. Musculoskeletal: warm without cyanosis. There is 1+ B lower leg edema. Skin:  no jaundice or rashes, surgical wounds Neurologic: no gross neuro deficits Psychiatric:  alert and nodding to questions CHEST XRAY:  
1/23/20 IMPRESSION: 
Stable abnormal exam with tubes and lines as described above Atherosclerosis LAB No lab exists for component: [place] Recent Labs  
  01/26/20 
0347 01/25/20 
0325 01/24/20 
0450 WBC 16.6* 16.5* 17.7* HGB 9.0* 8.8* 10.0* HCT 29.0* 29.0* 32.2*  
 PLT 48* 43* 65* Recent Labs  
  01/26/20 
0347 01/25/20 
0325 01/24/20 
0446 01/23/20 1954  142 141  --   
K 3.9 4.3 5.0 4.7  106 104  --   
CO2 32 33* 30  --   
* 170* 213*  --   
BUN 6* 17 22  --   
CREA 0.91 1.67* 2.19*  --   
MG  --  1.8 2.1 1.7*  
CA 7.8* 7.7* 8.2*  --   
PHOS  --   --  5.4*  --   
 
ABG:   
Lab Results Component Value Date/Time PHI 7.429 01/25/2020 08:24 PM  
 PCO2I 47.0 (H) 01/25/2020 08:24 PM  
 PO2I 73 (L) 01/25/2020 08:24 PM  
 HCO3I 31.1 (H) 01/25/2020 08:24 PM  
 FIO2I 45 01/25/2020 08:24 PM  
 
 
 
Assessment:  (Medical Decision Making) Hospital Problems  Date Reviewed: 12/6/2019 Codes Class Noted POA  
 HIT (heparin-induced thrombocytopenia) ([facility]) ICD-10-CM: V73.84 ICD-9-CM: 289.84  1/23/2020 Unknown Bilateral pneumothorax ICD-10-CM: J93.9 ICD-9-CM: 512.89  1/17/2020 Unknown Thrombocytopenia ([facility] [address] ICD-10-CM: D69.6 ICD-9-CM: 287.5  1/17/2020 Unknown Shock ([facility] ICD-10-CM: R57.9 ICD-9-CM: 785.50  1/15/2020 Unknown Atrial fibrillation [facility]) ICD-10-CM: I48.91 
ICD-9-CM: 427.31  1/13/2020 Unknown Acute renal failure (ARF) (H[facility]) ICD-10-CM: N17.9 ICD-9-CM: 584.9  1/11/2020 Unknown Aortic valve stenosis ICD-10-CM: I35.0 ICD-9-CM: 424.1  1/10/2020 Unknown * (Principal) CAD (coronary artery disease) ICD-10-CM: I25.10 ICD-9-CM: 414.00  1/10/2020 Unknown Weaning from respirator [facility]) extubated ICD-10-CM: Z99.11 ICD-9-CM: V46.13  1/10/2020 Unknown Acute hypoxemic respiratory failure ([address] ICD-10-CM: J96.01 
ICD-9-CM: 518.81  1/10/2020 S/P CABG x 3 ICD-10-CM: Z95.1 ICD-9-CM: V45.81  1/10/2020 Unknown S/P AVR ICD-10-CM: Z95.2 ICD-9-CM: V43.3  1/10/2020 Unknown Patient with prolonged intubation s/p cabg and avr. Current indications are that he would likely fail extubation with his severe tachypnea and low tidal volumes. HIT + both on ROSA and SHARON. LE doppler with B lower extremity clot. Renal failure so unable to perform CT PE. Plan:  (Medical Decision Making)  
-continue optilfow ,wean as tolerated 
- IS, pulmomary toilet 
-mobilize - CRRT per renal 
-continue argatroban 
- speech therapy Rosales Johnson MD

## 2020-01-26 NOTE — PROGRESS NOTES
POD 16 Days Post-Op Procedure:  Procedure(s): CORONARY ARTERY BYPASS GRAFT WITH AVR/ (CABG X 3 )/ LIMA VEIN HARVEST/ GREATER SAPHENOUS 
ESOPHAGEAL TRANS ECHOCARDIOGRAM 
 
 
Subjective:  
 
Patient has No significant medical complaints Objective:  
 
Patient Vitals for the past 8 hrs: 
 BP Temp Pulse Resp SpO2 Weight  
20 0944 137/63  (!) 122 10 100 %   
20 0930 140/71  (!) 133  98 %   
20 0915 148/64  85 9 100 %   
20 0859 136/60  83 17 100 %   
20 0844 135/61  82 17 98 %   
20 0830 141/59  85 (!) 6 100 %   
20 0815 146/66  87 26 99 %   
20 0759 145/63  86 17 99 %   
20 0744 144/66  88 22 99 %   
20 0730 135/62 98 °F (36.7 °C) 87 21 98 %   
20 0715 134/61  86 19 100 %   
20 0659 132/63  86 22 100 %   
20 0615 133/59  89 26 100 %   
20 0559 128/58  87 11 100 %   
20 0544 125/56  86 21 95 %   
20 0530 130/62  88 26 98 %   
20 0515 122/58  88 12 93 %   
20 0502      311 lb 4.6 oz (141.2 kg) 20 0459 117/66  89 24 93 %   
20 0445 120/56  90 23 93 %   
20 0400 156/65  98 29 (!) 86 %   
20 0344 110/52  79 9 100 %   
20 0330 116/58  79 16 100 %   
20 0315 120/58 98 °F (36.7 °C) 79 16 100 %   
20 0259 111/54  78 16 100 %  Temp (24hrs), Av.3 °F (36.8 °C), Min:97.5 °F (36.4 °C), Max:99.5 °F (37.5 °C) Hemodynamics PAP Systolic: 50 PAP 
CO (l/min): 7.5 l/min CO 
CI (l/min/m2): 2.9 l/min/m2 CI 
 
701 - 1900 In: -  
Out: 383  
1901 - 700 In: 2461.3 [P.O.:150; I.V.:786.3] Out: 7159 [Urine:50; TZRTDP:819] CT Drainage 
  
  
   
  total of all CT's Heart:  regular rate and rhythm, S1, S2 normal, no murmur, click, rub or gallop Lung:  clear to auscultation bilaterally Neuro: Grossly non focal 
Incisions: Clean, dry, and intact Labs: Recent Results (from the past 24 hour(s)) GLUCOSE, POC Collection Time: 01/25/20 12:14 PM  
Result Value Ref Range Glucose (POC) 164 (H) 65 - 100 mg/dL GLUCOSE, POC Collection Time: 01/25/20  5:55 PM  
Result Value Ref Range Glucose (POC) 170 (H) 65 - 100 mg/dL POC G3 Collection Time: 01/25/20  8:24 PM  
Result Value Ref Range Device: High Flow Nasal Cannula FIO2 (POC) 45 % pH (POC) 7.429 7.35 - 7.45    
 pCO2 (POC) 47.0 (H) 35 - 45 MMHG  
 pO2 (POC) 73 (L) 75 - 100 MMHG  
 HCO3 (POC) 31.1 (H) 22 - 26 MMOL/L  
 sO2 (POC) 95 95 - 98 % Base excess (POC) 6 mmol/L Allens test (POC) YES Site RIGHT RADIAL Specimen type (POC) ARTERIAL Performed by Linda's CO2, POC 32 MMOL/L Flow rate (POC) 50.000 L/min Respiratory comment: NurseNotified COLLECT TIME 2,020 GLUCOSE, POC Collection Time: 01/26/20 12:15 AM  
Result Value Ref Range Glucose (POC) 195 (H) 65 - 100 mg/dL METABOLIC PANEL, BASIC Collection Time: 01/26/20  3:47 AM  
Result Value Ref Range Sodium 142 136 - 145 mmol/L Potassium 3.9 3.5 - 5.1 mmol/L Chloride 106 98 - 107 mmol/L  
 CO2 32 21 - 32 mmol/L Anion gap 4 (L) 7 - 16 mmol/L Glucose 172 (H) 65 - 100 mg/dL BUN 6 (L) 8 - 23 MG/DL Creatinine 0.91 0.8 - 1.5 MG/DL  
 GFR est AA >60 >60 ml/min/1.73m2 GFR est non-AA >60 >60 ml/min/1.73m2 Calcium 7.8 (L) 8.3 - 10.4 MG/DL  
PTT Collection Time: 01/26/20  3:47 AM  
Result Value Ref Range aPTT 57.1 (H) 24.7 - 39.8 SEC  
CBC W/O DIFF Collection Time: 01/26/20  3:47 AM  
Result Value Ref Range WBC 16.6 (H) 4.3 - 11.1 K/uL  
 RBC 2.96 (L) 4.23 - 5.6 M/uL HGB 9.0 (L) 13.6 - 17.2 g/dL HCT 29.0 (L) 41.1 - 50.3 % MCV 98.0 (H) 79.6 - 97.8 FL  
 MCH 30.4 26.1 - 32.9 PG  
 MCHC 31.0 (L) 31.4 - 35.0 g/dL  
 RDW 15.4 (H) 11.9 - 14.6 % PLATELET 48 (L) 333 - 450 K/uL MPV 12.4 (H) 9.4 - 12.3 FL ABSOLUTE NRBC 0.00 0.0 - 0.2 K/uL GLUCOSE, POC  
 Collection Time: 01/26/20  6:02 AM  
Result Value Ref Range Glucose (POC) 168 (H) 65 - 100 mg/dL Assessment:  
 
Principal Problem: 
  CAD (coronary artery disease) (1/10/2020) Active Problems: Aortic valve stenosis (1/10/2020) Weaning from respirator [facility]) (1/10/2020) Acute hypoxemic respiratory failure ([address] (1/10/2020) S/P CABG x 3 (1/10/2020) S/P AVR (1/10/2020) Acute renal failure (ARF) ([address] (1/11/2020) Atrial fibrillation ([address] (1/13/2020) Shock ([address] (1/15/2020) Bilateral pneumothorax (1/17/2020) Thrombocytopenia ([address] (1/17/2020) HIT (heparin-induced thrombocytopenia) ([address] (1/23/2020) Plan/Recommendations/Medical Decision Making:  
 
Extubated, start po intake, plts 48K, on Argat for HIT, CRRT as needed See orders

## 2020-01-26 NOTE — PROGRESS NOTES
Renal Progress Note Admission Date: 1/10/2020 Subjective: The patient was seen on dialysis at 7:54 AM . Catheter is functioning well. Has been running  For >17 hours. Now is extubated Objective:  
 
Physical Exam:   
Patient Vitals for the past 8 hrs: 
 BP Temp Pulse Resp SpO2 Weight  
01/26/20 0615 133/59  89 26 100 %   
01/26/20 0559 128/58  87 11 100 %   
01/26/20 0544 125/56  86 21 95 %   
01/26/20 0530 130/62  88 26 98 %   
01/26/20 0515 122/58  88 12 93 %   
01/26/20 0502      141.2 kg (311 lb 4.6 oz) 01/26/20 0459 117/66  89 24 93 %   
01/26/20 0445 120/56  90 23 93 %   
01/26/20 0400 156/65  98 29 (!) 86 %   
01/26/20 0344 110/52  79 9 100 %   
01/26/20 0330 116/58  79 16 100 %   
01/26/20 0315 120/58 98 °F (36.7 °C) 79 16 100 %   
01/26/20 0259 111/54  78 16 100 %   
01/26/20 0244 115/56  82 13 100 %   
01/26/20 0230 111/55  80 8 100 %   
01/26/20 0215 115/56  82 9 100 %   
01/26/20 0159 112/56  81 (!) 0 100 %   
01/26/20 0144 123/58  86 23 98 %   
01/26/20 0130 113/58  (!) 118 (!) 5 95 %   
01/26/20 0115 107/59  (!) 109 (!) 33 94 %   
01/26/20 0059 120/56  86 (!) 31 97 %   
01/26/20 0044 116/56  87 29 95 %   
01/26/20 0030 118/55  (!) 119 22 (!) 89 %   
01/26/20 0015 103/59  (!) 122 15 96 %   
01/25/20 2359 112/53  86 9 97 %   
01/25/20 2344 113/55  89 (!) 38 (!) 86 %  Gen:sitting up, comfortable HEENT: moist membranes CV: S1, S2 
Lungs: Coarse bilaterally Extem: 2+ edema Current Facility-Administered Medications Medication Dose Route Frequency  ondansetron (ZOFRAN) injection 4 mg  4 mg IntraVENous Q6H PRN  
 busPIRone (BUSPAR) tablet 10 mg  10 mg Oral TID  morphine 10 mg/ml injection 5 mg  5 mg IntraVENous Q4H PRN  
 oxyCODONE-acetaminophen (PERCOCET) 5-325 mg per tablet 1 Tab  1 Tab Per NG tube Q4H PRN  
 amiodarone (CORDARONE) 450 mg in dextrose 5% 250 mL infusion  0.5-1 mg/min IntraVENous TITRATE  traMADol (ULTRAM) tablet 50 mg  50 mg Oral Q6H PRN  
 insulin lispro (HUMALOG) injection   SubCUTAneous Q6H  
 famotidine (PEPCID) 40 mg/5 mL (8 mg/mL) oral suspension 20 mg  20 mg Per NG tube DAILY  amiodarone (CORDARONE) tablet 400 mg  400 mg Per NG tube Q12H  
 epoetin maritza-epbx (RETACRIT) 14,000 Units combo injection  14,000 Units SubCUTAneous Q7D  
 argatroban 50 mg in 0.9% sodium chloride 50 mL (1000 mcg/mL) infusion  0.5-10 mcg/kg/min IntraVENous TITRATE  alcohol 62% (NOZIN) nasal  1 Ampule  1 Ampule Topical Q12H  
 NUTRITIONAL SUPPORT ELECTROLYTE PRN ORDERS   Does Not Apply PRN  
 NOREPINephrine (LEVOPHED) 16,000 mcg in dextrose 5% 250 mL infusion  0.05-0.2 mcg/kg/min IntraVENous TITRATE  albuterol (PROVENTIL VENTOLIN) nebulizer solution 2.5 mg  2.5 mg Nebulization QID PRN  
 acetaminophen (TYLENOL) solution 650 mg  650 mg Per NG tube Q4H PRN  
 sodium chloride (NS) flush 5-40 mL  5-40 mL IntraVENous Q8H  
 sodium chloride (NS) flush 5-40 mL  5-40 mL IntraVENous PRN  
 naloxone (NARCAN) injection 0.4 mg  0.4 mg IntraVENous PRN  
 [Held by provider] metoprolol tartrate (LOPRESSOR) tablet 25 mg  25 mg Oral Q12H  
 atorvastatin (LIPITOR) tablet 80 mg  80 mg Oral QHS  dextrose (D50W) injection syrg 12.5 g  25 mL IntraVENous PRN  
 [Held by provider] aspirin chewable tablet 81 mg  81 mg Oral DAILY  magnesium sulfate 1 g/100 ml IVPB (premix or compounded)  1 g IntraVENous PRN  
 midazolam (VERSED) injection 1 mg  1 mg IntraVENous Q1H PRN  
 sodium bicarbonate (8.4%) injection 50 mEq  50 mEq IntraVENous PRN Data Review:  
 
LABS:  
Recent Results (from the past 12 hour(s)) POC G3 Collection Time: 01/25/20  8:24 PM  
Result Value Ref Range Device: High Flow Nasal Cannula FIO2 (POC) 45 % pH (POC) 7.429 7.35 - 7.45    
 pCO2 (POC) 47.0 (H) 35 - 45 MMHG  
 pO2 (POC) 73 (L) 75 - 100 MMHG  
 HCO3 (POC) 31.1 (H) 22 - 26 MMOL/L  
 sO2 (POC) 95 95 - 98 % Base excess (POC) 6 mmol/L Allens test (POC) YES Site RIGHT RADIAL Specimen type (POC) ARTERIAL Performed by Mckeon's CO2, POC 32 MMOL/L Flow rate (POC) 50.000 L/min Respiratory comment: NurseNotified COLLECT TIME 2,020 GLUCOSE, POC Collection Time: 01/26/20 12:15 AM  
Result Value Ref Range Glucose (POC) 195 (H) 65 - 100 mg/dL METABOLIC PANEL, BASIC Collection Time: 01/26/20  3:47 AM  
Result Value Ref Range Sodium 142 136 - 145 mmol/L Potassium 3.9 3.5 - 5.1 mmol/L Chloride 106 98 - 107 mmol/L  
 CO2 32 21 - 32 mmol/L Anion gap 4 (L) 7 - 16 mmol/L Glucose 172 (H) 65 - 100 mg/dL BUN 6 (L) 8 - 23 MG/DL Creatinine 0.91 0.8 - 1.5 MG/DL  
 GFR est AA >60 >60 ml/min/1.73m2 GFR est non-AA >60 >60 ml/min/1.73m2 Calcium 7.8 (L) 8.3 - 10.4 MG/DL  
PTT Collection Time: 01/26/20  3:47 AM  
Result Value Ref Range aPTT 57.1 (H) 24.7 - 39.8 SEC  
CBC W/O DIFF Collection Time: 01/26/20  3:47 AM  
Result Value Ref Range WBC 16.6 (H) 4.3 - 11.1 K/uL  
 RBC 2.96 (L) 4.23 - 5.6 M/uL HGB 9.0 (L) 13.6 - 17.2 g/dL HCT 29.0 (L) 41.1 - 50.3 % MCV 98.0 (H) 79.6 - 97.8 FL  
 MCH 30.4 26.1 - 32.9 PG  
 MCHC 31.0 (L) 31.4 - 35.0 g/dL  
 RDW 15.4 (H) 11.9 - 14.6 % PLATELET 48 (L) 893 - 450 K/uL MPV 12.4 (H) 9.4 - 12.3 FL ABSOLUTE NRBC 0.00 0.0 - 0.2 K/uL GLUCOSE, POC Collection Time: 01/26/20  6:02 AM  
Result Value Ref Range Glucose (POC) 168 (H) 65 - 100 mg/dL Plan:  
 
Principal Problem: 
  CAD (coronary artery disease) (1/10/2020) Active Problems: Aortic valve stenosis (1/10/2020) Weaning from respirator [facility]) (1/10/2020) Acute hypoxemic respiratory failure ([address] (1/10/2020) S/P CABG x 3 (1/10/2020) S/P AVR (1/10/2020) Acute renal failure (ARF) ([address] (1/11/2020) Atrial fibrillation ([address] (1/13/2020) Shock ([address] (1/15/2020) Bilateral pneumothorax (1/17/2020) Thrombocytopenia ([facility] [address] (1/17/2020) HIT (heparin-induced thrombocytopenia) ([facility] [address] (1/23/2020) ASHD/ AS - s/p CABG  Aortic Valve replacement Thrombocytopenia- HIT positive on Argatroban Acute on CKD - Renal US unremarkable. Oliguric Dialysis via SLED and tolerated over night. Metabolic parameters are excellent. Looks like about to clot the machine. Will rinse him back now and hold treatment until tomorrow.  Can assess HD vs SLED 
  
  
DM 
  
HTN/ Volume - UF as tolerated 
  
Resp Failure - Hypoxic Resp failure from pulmonary edema- improved and extubated.

## 2020-01-26 NOTE — PROGRESS NOTES
01/25/20 2240 Oxygen Therapy O2 Sat (%) 100 % Pulse via Oximetry 122 beats per minute O2 Device BIPAP  
FIO2 (%) 50 % Respiratory Respiratory (WDL) X Respiratory Pattern Tachypneic Upper Airway Sounds Coarse Chest/Tracheal Assessment Chest expansion, symmetrical  
Breath Sounds Bilateral Clear;Diminished Cough Non-productive CPAP/BIPAP  
CPAP/BIPAP Start/Stop On Device Mode BIPAP  
$$ Bipap Daily Yes Mask Type and Size Full face; Large Skin Condition intact PIP Observed 17 cm H20 IPAP (cm H2O) 16 cm H2O  
EPAP (cm H2O) 8 cm H2O Inspiratory Time (sec) 1 seconds Vt Spont (ml) 531 ml Ve Observed (l/min) 16.7 l/min Backup Rate 16 Total RR (Spontaneous) 31 breaths per minute Insp Rise Time (sec) 3 Leak (Estimated) 40 L/min  
Pt's Home Machine No  
Biomedical Check Performed Yes Settings Verified Yes Alarm Settings High Pressure 30 Low Pressure 5 Apnea 20 Low Ve 2 High Rate 50 Low Rate 5 Pulmonary Toilet Pulmonary Toilet H. O.B elevated;Cough and deep breath

## 2020-01-27 NOTE — PROGRESS NOTES
Problem: Mobility Impaired (Adult and Pediatric) Goal: *Acute Goals and Plan of Care (Insert Text) Description STG: 
(1.)Patient will roll side to side in bed with  MAXIMAL ASSIST within 3-5 day(s). (2.)Patient will move from supine to sit and sit to supine  in bed with  MAXIMAL ASSIST within 3-5 day(s). (3.)Patient will sit edge of bed/chair with STAND BY ASSIST and good balance statically  for 10 minutes within 3-5 day(s). 4.  Patient will perform 1 sets of 10 repetitions of active range of motion exercises for bilateral lower extremity(s) with  cueing within 3-5 day(s). 5.  Patient will tolerate sitting with bed in chair position for 2 hour 2x/day to facilitate tolerance to sitting in chair within 3-5 days. LTG: 
(1.)Patient will move from supine to sit and sit to supine  and roll side to side in bed with  MODERATE ASSIST  within 7-10 day(s). (2.)Patient will transfer from bed to chair and chair to bed with  MAXIMAL ASSIST using the least restrictive device within 7-10 day(s). (3.)Patient will stand with  MINIMAL ASSIST for 2 minutes with the least restrictive device within 7-10 day(s). 4.  Patient will exhibit 3/5 strength B LE's to facilitate increased independence with bed mobility within 7-10 days. Outcome: Progressing Towards Goal 
  
PHYSICAL THERAPY: Daily Note and AM 1/27/2020 INPATIENT: PT Visit Days : 2 Payor: Sally Marinelli / Plan: [employer] / Product Type: [employer] Medicare /   
  
NAME/AGE/GENDER: Jim Gómez is a 69 y.o. male PRIMARY DIAGNOSIS: Atherosclerosis of native coronary artery of native heart with unstable angina pectoris ([facility] [address] [I25.110] Nonrheumatic aortic valve stenosis [I35.0] CAD (coronary artery disease) [I25.10] Aortic valve stenosis [I35.0] CAD (coronary artery disease) CAD (coronary artery disease) Procedure(s) (LRB): 
CORONARY ARTERY BYPASS GRAFT WITH AVR/ (CABG X 3 )/ LIMA (N/A) VEIN HARVEST/ GREATER SAPHENOUS (Left) ESOPHAGEAL TRANS ECHOCARDIOGRAM (N/A) 17 Days Post-Op ICD-10: Treatment Diagnosis:  
 · Other abnormalities of gait and mobility (R26.89) Precaution/Allergies: 
Heparin; Amoxicillin; Keflex [cephalexin]; and Pcn [penicillins] ASSESSMENT:  
 
Mr. Stuart Acuña presents supine in bed CVICU. He underwent above surgery on 1/10/20 and was only recently extubated. He lives with his wife and ambulates independently with cane or RW at baseline. Patient is only oriented to self today. He moves all 4 extremities. B UE's grossly 2 to 3/5 strength. B hips grossly 2/5, while knees/ankles grossly 3+ to 4-. Patient requires total assist for all bed mobility. Bed placed in chair position and patient assisted to unsupported sitting. He attempted to stand to RW with total A of 3 but was unable to fully extend hips/knees/trunk. 's to 140's and patient dyspneic. Patient very fatigued after this. Mr. Stuart Acuña is functioning well below baseline and is therefore appropriate for skilled PT to maximize rehab potential.  
1/27/20 AM:  Pt supine in bed on arrival. He is agreeable to PT. Pt was sound asleep and startles easily. Supine to sit with AirTAP with mod X2. He required min/mod A for sitting balance. Sit to stand x3 with mod A x2 and another person blocking his knees. Pt able to stand for 30 seconds each time with last trial almost one minute. Sit to supine with total assist x 3 people. Pt left supine with RN at bedside. pts vital signs remained stable during treatment. He complained of dizziness while sitting and standing. Pt scheduled for HD this afternoon. Will check back on him later today. This section established at most recent assessment PROBLEM LIST (Impairments causing functional limitations): 1. Decreased Strength 2. Decreased ADL/Functional Activities 3. Decreased Transfer Abilities 4. Decreased Ambulation Ability/Technique 5. Decreased Balance 6. Decreased Activity Tolerance 7. Increased Fatigue 8. Increased Shortness of Breath 9. Decreased Knowledge of Precautions INTERVENTIONS PLANNED: (Benefits and precautions of physical therapy have been discussed with the patient.) 1. Balance Exercise 2. Bed Mobility 3. Neuromuscular Re-education/Strengthening 4. Therapeutic Activites 5. Therapeutic Exercise/Strengthening 6. Transfer Training 7. education TREATMENT PLAN: Frequency/Duration: twice daily for duration of hospital stay Rehabilitation Potential For Stated Goals: Good REHAB RECOMMENDATIONS (at time of discharge pending progress):   
Placement: It is my opinion, based on this patient's performance to date, that Mr. Joanna Estrella may benefit from intensive therapy at a [address] [place] [address] after discharge due to the functional deficits listed above that are likely to improve with skilled rehabilitation and severe debility. Equipment:  
? tbd  
? None at this time HISTORY:  
History of Present Injury/Illness (Reason for Referral): 
Patient admitted for above surgery. Past Medical History/Comorbidities:  
Mr. Joanna Estrella  has a past medical history of Arthritis, BPH (benign prostatic hyperplasia) (1/14/2013), CAD (coronary artery disease), DM type 2 (diabetes mellitus, type 2) ([facility] [address] (dx 2004), Dyspnea, Gout (1/14/2013), HLD (hyperlipidemia) (1/14/2013), HTN (hypertension), Morbid obesity ([facility] [address] (9/3/14), Psychiatric disorder, Rheumatic fever, Seasonal allergic rhinitis, Severe aortic valve stenosis, Thyroid disease, and Unspecified sleep apnea (2016). Mr. Joanna Estrella  has a past surgical history that includes hx tonsil and adenoidectomy; hx heart catheterization (12/23/2019); hx orthopaedic (Left); and hx cataract removal (Bilateral, 2012). Social History/Living Environment:  
Home Environment: Private residence One/Two Story Residence: One story Living Alone: No 
Support Systems: Spouse/Significant Other/Partner Patient Expects to be Discharged to[de-identified] Private residence Current DME Used/Available at Home: Cane, straight Prior Level of Function/Work/Activity: 
 
He lives with his wife and ambulates independently with cane or RW at baseline. Number of Personal Factors/Comorbidities that affect the Plan of Care: 3+: HIGH COMPLEXITY EXAMINATION:  
Most Recent Physical Functioning:  
Gross Assessment: 
  
         
  
Posture: 
  
Balance: 
Sitting: Impaired Bed Mobility: 
Supine to Sit: Moderate assistance;Assist x2(with AirTAP) Sit to Supine: Total assistance;Assist x2 Wheelchair Mobility: 
  
Transfers: 
Sit to Stand: Moderate assistance;Assist x2(plus another to block knees) Stand to Sit: Moderate assistance;Assist x2 Gait: 
  
   
  
Body Structures Involved: 1. Heart 2. Lungs 3. Muscles Body Functions Affected: 1. Cardio 2. Respiratory 3. Neuromusculoskeletal 
4. Movement Related Activities and Participation Affected: 1. Mobility 2. Self Care 3. Domestic Life 4. Community, Social and [place] Number of elements that affect the Plan of Care: 4+: HIGH COMPLEXITY CLINICAL PRESENTATION:  
Presentation: Evolving clinical presentation with changing clinical characteristics: MODERATE COMPLEXITY CLINICAL DECISION MAKIN[address] [place] [address] AM-[facility] 6 Clicks Basic Mobility Inpatient Short Form How much difficulty does the patient currently have. .. Unable A Lot A Little None 1. Turning over in bed (including adjusting bedclothes, sheets and blankets)? [x] 1   [] 2   [] 3   [] 4  
2. Sitting down on and standing up from a chair with arms ( e.g., wheelchair, bedside commode, etc.)   [x] 1   [] 2   [] 3   [] 4  
3. Moving from lying on back to sitting on the side of the bed? [x] 1   [] 2   [] 3   [] 4 How much help from another person does the patient currently need. .. Total A Lot A Little None 4. Moving to and from a bed to a chair (including a wheelchair)?    [x] 1   [] 2   [] 3   [] 4  
 5.  Need to walk in hospital room? [x] 1   [] 2   [] 3   [] 4  
6. Climbing 3-5 steps with a railing? [x] 1   [] 2   [] 3   [] 4  
© 2007, Trustees of [address] [place] [address] [address] [address], under license to [employer]. All rights reserved Score:  Initial: 6 Most Recent: X (Date: -- ) Interpretation of Tool:  Represents activities that are increasingly more difficult (i.e. Bed mobility, Transfers, Gait). Medical Necessity:    
· Patient is expected to demonstrate progress in  
· strength, range of motion, balance, coordination, functional technique, and activity tolerance ·  to  
· decrease assistance required with all functional mobility · . Reason for Services/Other Comments: 
· Patient continues to require skilled intervention due to · medical complications and patient unable to attend/participate in therapy as expected · . Use of outcome tool(s) and clinical judgement create a POC that gives a: Questionable prediction of patient's progress: MODERATE COMPLEXITY  
  
 
 
 
TREATMENT:  
(In addition to Assessment/Re-Assessment sessions the following treatments were rendered) Pre-treatment Symptoms/Complaints:  Complaints of dizziness Pain: Initial:  
   Post Session:  0 Therapeutic Activity: (    25 minutes): Therapeutic activities including Bed transfers and Ambulation on level ground to improve mobility, strength and balance. Required moderate cues for breathing while standing   to promote dynamic balance in standing. DATE: 1/26/20 Straight leg raise Hip abduct/ adduct X5 AAB Heel slides  X3 AB Hip external/ internal rotation Ankle dorsiflexion/ plantarflexion X5 AB Sitting unsupported x5' Key:  A=active, AA=active assisted, P=passive, B=bilaterally, R=right, L=left Braces/Orthotics/Lines/Etc:  
· IV 
· brown catheter · Wound vac · O2 Device: Heated, Hi flow nasal cannula Treatment/Session Assessment: · Response to Treatment:  Fatigued after treatment. Complaints of dizziness during treatment. · Interdisciplinary Collaboration:  
o Physical Therapy Assistant 
o Registered Nurse 
o Rehabilitation Attendant · After treatment position/precautions:  
o Supine in bed 
o Bed/Chair-wheels locked 
o Call light within reach 
o Nurse at bedside · Compliance with Program/Exercises: Will assess as treatment progresses · Recommendations/Intent for next treatment session: \"Next visit will focus on advancements to more challenging activities and reduction in assistance provided\". Total Treatment Duration: PT Patient Time In/Time Out Time In: 1030 Time Out: 1055 David Lopez, RICK

## 2020-01-27 NOTE — PROGRESS NOTES
Ovidio Welsh Admission Date: 1/10/2020 Daily Progress Note: 1/27/2020 The patient's chart is reviewed and the patient is discussed with the staff. Patient had CABG x 3 and AVR on 1/10.  Slow to improve. Had small ptx that self resolved. 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 Subjective:  
 
 
Still on optiflow 50 L /50 % Feels very weak Current Facility-Administered Medications Medication Dose Route Frequency  insulin regular (NOVOLIN R, HUMULIN R) injection 0-15 Units  0-15 Units SubCUTAneous AC&HS  ondansetron (ZOFRAN) injection 4 mg  4 mg IntraVENous Q6H PRN  
 busPIRone (BUSPAR) tablet 10 mg  10 mg Oral TID  morphine 10 mg/ml injection 5 mg  5 mg IntraVENous Q4H PRN  
 oxyCODONE-acetaminophen (PERCOCET) 5-325 mg per tablet 1 Tab  1 Tab Per NG tube Q4H PRN  
 amiodarone (CORDARONE) 450 mg in dextrose 5% 250 mL infusion  0.5-1 mg/min IntraVENous TITRATE  traMADol (ULTRAM) tablet 50 mg  50 mg Oral Q6H PRN  
 famotidine (PEPCID) 40 mg/5 mL (8 mg/mL) oral suspension 20 mg  20 mg Per NG tube DAILY  amiodarone (CORDARONE) tablet 400 mg  400 mg Per NG tube Q12H  
 epoetin maritza-epbx (RETACRIT) 14,000 Units combo injection  14,000 Units SubCUTAneous Q7D  
 argatroban 50 mg in 0.9% sodium chloride 50 mL (1000 mcg/mL) infusion  0.5-10 mcg/kg/min IntraVENous TITRATE  alcohol 62% (NOZIN) nasal  1 Ampule  1 Ampule Topical Q12H  
 NUTRITIONAL SUPPORT ELECTROLYTE PRN ORDERS   Does Not Apply PRN  
 NOREPINephrine (LEVOPHED) 16,000 mcg in dextrose 5% 250 mL infusion  0.05-0.2 mcg/kg/min IntraVENous TITRATE  albuterol (PROVENTIL VENTOLIN) nebulizer solution 2.5 mg  2.5 mg Nebulization QID PRN  
 acetaminophen (TYLENOL) solution 650 mg  650 mg Per NG tube Q4H PRN  
  sodium chloride (NS) flush 5-40 mL  5-40 mL IntraVENous Q8H  
 sodium chloride (NS) flush 5-40 mL  5-40 mL IntraVENous PRN  
 naloxone (NARCAN) injection 0.4 mg  0.4 mg IntraVENous PRN  
 [Held by provider] metoprolol tartrate (LOPRESSOR) tablet 25 mg  25 mg Oral Q12H  
 atorvastatin (LIPITOR) tablet 80 mg  80 mg Oral QHS  dextrose (D50W) injection syrg 12.5 g  25 mL IntraVENous PRN  
 [Held by provider] aspirin chewable tablet 81 mg  81 mg Oral DAILY  magnesium sulfate 1 g/100 ml IVPB (premix or compounded)  1 g IntraVENous PRN  
 midazolam (VERSED) injection 1 mg  1 mg IntraVENous Q1H PRN  
 sodium bicarbonate (8.4%) injection 50 mEq  50 mEq IntraVENous PRN Review of Systems Unobtainable due to patient status. Objective:  
 
Vitals:  
 01/27/20 8997 01/27/20 0459 01/27/20 0557 01/27/20 0559 BP: 121/56 126/58  146/74 Pulse: 77 77  82 Resp: 18 16  28 Temp:      
SpO2: 99% 99%  100% Weight:   310 lb 10.1 oz (140.9 kg) Height:      
 
Intake and Output:  
01/25 1901 - 01/27 0700 In: 1869.7 [P.O.:480; I.V.:964.7] Out: 3225 [Urine:135; Drains:100] No intake/output data recorded. Physical Exam:  
Constitution:  the patient is well developed and in moderate respiratory distress EENMT:  Sclera clear, pupils equal, oral mucosa moist 
Respiratory: ETT in place. Distant but clear. Cardiovascular:  RRR without M,G,R 
Gastrointestinal: soft and non-tender; with positive bowel sounds. Musculoskeletal: warm without cyanosis. There is 1+ B lower leg edema. Skin:  no jaundice or rashes, surgical wounds Neurologic: no gross neuro deficits Psychiatric:  alert and nodding to questions CHEST XRAY:  
1/23/20 IMPRESSION: 
Stable abnormal exam with tubes and lines as described above Atherosclerosis LAB No lab exists for component: [place] Recent Labs  
  01/27/20 
0242 01/26/20 
0347 01/25/20 
0325 WBC 16.2* 16.6* 16.5* HGB 9.1* 9.0* 8.8* HCT 29.8* 29.0* 29.0*  
 PLT 97* 48* 43* Recent Labs  
  01/27/20 
0242 01/26/20 
0347 01/25/20 
0325  142 142  
K 4.2 3.9 4.3  106 106 CO2 30 32 33* * 172* 170* BUN 21 6* 17  
CREA 2.66* 0.91 1.67* MG 2.0  --  1.8  
CA 8.3 7.8* 7.7* PHOS 3.5  --   --   
 
ABG:   
No results found for: PH, PHI, PCO2, PCO2I, PO2, PO2I, HCO3, HCO3I, FIO2, FIO2I Assessment:  (Medical Decision Making) Hospital Problems  Date Reviewed: 12/6/2019 Codes Class Noted POA  
 HIT (heparin-induced thrombocytopenia) ([facility]) ICD-10-CM: M33.75 ICD-9-CM: 289.84  1/23/2020 Unknown Bilateral pneumothorax ICD-10-CM: J93.9 ICD-9-CM: 512.89  1/17/2020 Unknown Thrombocytopenia ([facility] ICD-10-CM: D69.6 ICD-9-CM: 287.5  1/17/2020 Unknown Shock ([facility] ICD-10-CM: R57.9 ICD-9-CM: 785.50  1/15/2020 Unknown Atrial fibrillation [facility]) ICD-10-CM: I48.91 
ICD-9-CM: 427.31  1/13/2020 Unknown Acute renal failure (ARF) ([facility]) ICD-10-CM: N17.9 ICD-9-CM: 584.9  1/11/2020 Unknown Aortic valve stenosis ICD-10-CM: I35.0 ICD-9-CM: 424.1  1/10/2020 Unknown * (Principal) CAD (coronary artery disease) ICD-10-CM: I25.10 ICD-9-CM: 414.00  1/10/2020 Unknown Weaning from respirator [facility]) extubated ICD-10-CM: Z99.11 ICD-9-CM: V46.13  1/10/2020 Unknown Acute hypoxemic respiratory failure ([facility] ICD-10-CM: J96.01 
ICD-9-CM: 518.81  1/10/2020 S/P CABG x 3 ICD-10-CM: Z95.1 ICD-9-CM: V45.81  1/10/2020 Unknown S/P AVR ICD-10-CM: Z95.2 ICD-9-CM: V43.3  1/10/2020 Unknown Patient with prolonged intubation s/p cabg and avr. Current indications are that he would likely fail extubation with his severe tachypnea and low tidal volumes. HIT + both on ROSA and SHARON. LE doppler with B lower extremity clot. Renal failure so unable to perform CT PE. Plan:  (Medical Decision Making)  
-continue optilfow ,wean as tolerated 
- IS, pulmomary toilet HD per renal  
 -continue argatroban, Amiodarone  
- mobilize ,PT Diane Fonseca MD

## 2020-01-27 NOTE — PROGRESS NOTES
Nutrition Follow up:  
 Assessment:  
Diet orders: 1-26: Clear liquid, 1-27: CCHO, cardiac Food/Nutrition History: Extubated and OGT removed 1-25. Pt seen in company of son. Pt reports he is having a hard time feeding himself due to lack of coordination. RN reports pt wants to feed himself and won't allow to be fed. He was able to fed himself ~50% of breakfast but was nt hungry for lunch. he ate some ice cream but found that made him have more phlegm. He doesn't have much interest in eating supper and reports decreased appetite. He has not favorite foods as he typically eats anything and everything. He is receptive to trying finger foods and a nutritional supplement. Anthropometrics:Height: 5' 10\" (177.8 cm), Weight Source: Bed, Weight: 140.9 kg (310 lb 10.1 oz) Last 3 Recorded Weights in this Encounter 01/25/20 3717 01/26/20 0502 01/27/20 9886 Weight: 149.4 kg (329 lb 5.9 oz) 141.2 kg (311 lb 4.6 oz) 140.9 kg (310 lb 10.1 oz) Edema: Anasarca generalized, 4+BUE and BLE Pre-op standing scale weight: 136.6 kg UBW range per EMR: 300-306# Macronutrient needs:(using UBW (Usual body weight) 136.4 kg and IBW 75.5 kg) EER:  5293-7269 kcal /day (11-14 kcal/kg UBW) Revised EPR:  91-98 grams protein/day (1.2-1.3 grams/kg IBW)-for HD Intake/Comparative Standards:Average intake for past 1 day(s)/2 recorded meal(s): 25%. This potentially meets ~25-50% of kcal and ~25% of protein needs Intervention: 
Meals and snacks: Continue current diet. Offer finger foods. Nutrition Supplement Therapy: Add Glucerna shake tid. Discontinue TF labs and electrolyte replacement per nutritional support protocols are active on MAR. Coordination of nutrition care: Discussed with OLYA Caballero, [address] [place] [address], [address] [place] [address]

## 2020-01-27 NOTE — PROGRESS NOTES
PT note: 
 
Pt on HD at this time. He still has a few hours remaining. Will follow up with him tomorrow.   
 
Bhavana Kiser, PTA

## 2020-01-27 NOTE — WOUND CARE
Patient seen for [facility] dressing change to left leg incisions (proximal). No weeping noted during dressing change today. Primary nurse reports still changing canister daily due to volume of serous output. Leg edema continues. Toes dusky bilaterally. Patient awake and talking but no questions at this time. Continued to include proximal 3 incisions, distal 2 incisions remain dry and now open to air. Updated nurse. Will follow up on Thursday.

## 2020-01-27 NOTE — DIALYSIS
Hemodialysis treatment completed without complications. Patient alert and VS stable  /57  P 71   
 
 4 Kgs removed. Flushed both ports with 10 mL of NS.  CVC dressing clean, dry, and intact, te[place] caps intact and white caps applied. Patient remains in 104.

## 2020-01-27 NOTE — PROGRESS NOTES
[address] Hematology Oncology Inpatient Hematology / Oncology Progress Note Admission Date: 1/10/2020  4:59 AM 
Reason for Admission/Hospital Course: Atherosclerosis of native coronary artery of native heart with unstable angina pectoris ([facility] [I25.110] Nonrheumatic aortic valve stenosis [I35.0] CAD (coronary artery disease) [I25.10] Aortic valve stenosis [I35.0] 24 Hour Events: HIT and SHARON + On Argatroban Extubated  Able to carry on conversations Constitutional: +fatigue. Negative fever, chills. CV: Negative for chest pain, palpitations, +edema. Respiratory:  +dyspnea, on hiflow oxygen GI: Negative for abdominal pain, diarrhea, nausea. Allergies Allergen Reactions  Heparin Other (comments) HIT antibody test strongly positive on 2020. SHARON drawn 2020 and resulted positive on 2020  Amoxicillin Rash  Keflex [Cephalexin] Rash  Pcn [Penicillins] Other (comments) \"felt closed in\". No rash OBJECTIVE: 
Patient Vitals for the past 8 hrs: 
 BP Temp Pulse Resp SpO2 Weight  
20 1159 130/59  74 24 99 %   
20 1059 144/65  84 29 92 %   
20 1055 141/80  89 27 92 %   
20 0959 126/58  79 19 99 %   
20 0859 118/55  80 17 100 %   
20 0824 117/71  89     
20 0800 117/71  (!) 128 26 97 %   
20 0700 143/66 98.5 °F (36.9 °C) 84 26 99 %   
20 0559 146/74  82 28 100 %   
20 0557      310 lb 10.1 oz (140.9 kg) 20 0459 126/58  77 16 99 %  Temp (24hrs), Av.5 °F (36.9 °C), Min:97.8 °F (36.6 °C), Max:99.2 °F (37.3 °C) No intake/output data recorded. Physical Exam: 
Constitutional: Acutely ill-appearing elderly male in no acute distress, lying comfortably in the hospital bed. HEENT: Normocephalic and atraumatic. Skin Warm and dry. Finger tips and toes dusky bilaterally Respiratory Clear anteriorly CVS Normal rate, regular rhythm Abdomen Soft, nontender and nondistended, normoactive bowel sounds. Neuro Alert and oriented MSK 1+ BLE pitting edema. Psych Appropriate Labs: 
   
Recent Labs  
  01/27/20 0242 01/26/20 0347 01/25/20 0325 WBC 16.2* 16.6* 16.5*  
RBC 3.02* 2.96* 2.93* HGB 9.1* 9.0* 8.8* HCT 29.8* 29.0* 29.0*  
MCV 98.7* 98.0* 99.0*  
MCH 30.1 30.4 30.0 MCHC 30.5* 31.0* 30.3*  
RDW 15.4* 15.4* 15.5* PLT 97* 48* 43* Recent Labs  
  01/27/20 0242 01/26/20 0347 01/25/20 0325  142 142  
K 4.2 3.9 4.3  106 106 CO2 30 32 33* AGAP 8 4* 3*  
* 172* 170* BUN 21 6* 17  
CREA 2.66* 0.91 1.67* GFRAA 30* >60 52* GFRNA 25* >60 43*  
CA 8.3 7.8* 7.7* MG 2.0  --  1.8 PHOS 3.5  --   --   
 
 
 
Imaging: XR CHEST SNGL V [[id number]] Collected: 01/19/20 0528 Order Status: Completed Updated: 01/19/20 4841 Narrative:    
EXAM: XR CHEST SNGL V 
 
INDICATION: Coronary artery disease COMPARISON: 1/18/2020 FINDINGS: A portable AP radiograph of the chest was obtained at 0334 hours. The 
patient is on a cardiac monitor. Bibasilar airspace disease. Worse in the 
interval. The cardiac and mediastinal contours and pulmonary vascularity are 
normal.  The bones and soft tissues are grossly within normal limits. Impression:    
IMPRESSION: Findings most consistent with pulmonary edema worse in the interval.  
XR CHEST PORT [[id number]] Collected: 01/18/20 0502 Order Status: Completed Updated: 01/18/20 7614 Narrative:    
EXAM: XR CHEST PORT INDICATION: Coronary artery disease COMPARISON: 1/17/2020 FINDINGS: A portable AP radiograph of the chest was obtained at 0500 hours. The 
patient is on a cardiac monitor. Bibasilar airspace disease improved. The 
cardiac and mediastinal contours and pulmonary vascularity are normal.  The 
bones and soft tissues are grossly within normal limits. Impression:    
IMPRESSION: Bibasilar atelectasis improved. XR CHEST SNGL V [[id number]] Collected: 01/17/20 1009 Order Status: Completed Updated: 01/17/20 1024 Narrative:    
CHEST X-RAY, one view. HISTORY:  Pneumothorax status post open heart surgery, follow-up. TECHNIQUE:  AP upright portable view COMPARISON: Yesterday's exam 
 
FINDINGS:    
Small right apical pneumothorax is even smaller. Increased atelectasis or 
infiltrate at both bases. ET tube dialysis type catheter introducer sheath and 
sternal wires remain. Impression:    
IMPRESSION:  Decreased pneumothorax. Increased atelectasis or infiltrate at both 
bases. XR CHEST SNGL V [[id number]] Order Status: Canceled XR CHEST SNGL V [[id number]] Collected: 01/16/20 0427 Order Status: Completed Updated: 01/16/20 0237 Narrative:    
Clinical history: Post chest tube removal. 
 
TECHNIQUE: AP portable chest 
 
COMPARISON: Yesterday. FINDINGS: 
 
Previous left-sided chest tube is no longer seen. Endotracheal and enteric tubes 
and left-sided subclavian catheter are stable. Right [place]-Nallely catheter has been 
removed. Right IJ sheath is still present. Postsurgical changes of sternotomy. There is vascular congestion. Bibasilar opacities, likely atelectasis. There is 
suggestion of small right apical pneumothorax. There is tiny left apical 
pneumothorax. Impression:    
IMPRESSION: 
 
1. Previous left chest tube is no longer seen. Tiny right apical pneumothorax. 2. Suggestion of small right apical pneumothorax. 3. Bibasilar atelectasis and vascular congestion, similar to prior. XR CHEST SNGL V [[id number]] Collected: 01/15/20 0799 Order Status: Completed Updated: 01/15/20 9170 Narrative:    
 Portable view of the chest  
 
COMPARISON: Yesterday. CLINICAL HISTORY: Postop. FINDINGS: 
 
Stable postsurgical changes of sternotomy. Endotracheal tube, right [place]-Nallely 
catheter and left chest tube are stable. Enteric tube is not well-seen. Bibasilar opacities, likely atelectasis. There is vascular congestion. No 
pneumothorax. Cardiomediastinal contour and the surrounding bones are stable. Impression:    
IMPRESSION: 
 
1. Stable post op findings. No significant change in the appearance of the 
lungs. No pneumothorax. XR CHEST SNGL V [[id number]] Collected: 01/14/20 0610 Order Status: Completed Updated: 01/14/20 0697 Narrative:    
 Portable view of the chest  
 
COMPARISON: Yesterday. CLINICAL HISTORY: Postop. FINDINGS: 
 
Stable postsurgical changes of sternotomy. Endotracheal tube, enteric tube, 
right-sided [place]-Nallely catheter and left chest tube are stable. There is stable 
left subclavian catheter. Cardiac mediastinal contour and the surrounding bones 
are stable. No pneumothorax. There is vascular congestion. Bibasilar opacities, 
likely atelectasis. Impression:    
IMPRESSION: 
 
1. Stable post op findings. ET tube tip is in good position. 2. Vascular congestion and bibasilar atelectasis, similar to prior exam.  
XR CHEST SNGL V [[id number]] Collected: 01/13/20 0650 Order Status: Completed Updated: 01/13/20 6782 Narrative:    
 Portable view of the chest  
 
COMPARISON: Yesterday CLINICAL HISTORY: Postop, follow-up. FINDINGS: 
 
Endotracheal tube, enteric tube, right-sided [place]-Nallely catheter, and left chest 
tube are stable. Cardiac silhouette is enlarged, similar to prior exam. 
Bibasilar opacities, likely atelectasis. There is vascular congestion. No 
pneumothorax. Left-sided subclavian catheter is stable. Impression:    
IMPRESSION: 
 
1. Stable post op findings. ET tube tip is in good position. 2. Bibasilar atelectasis and vascular congestion. No pneumothorax. 3. No significant change compared to prior exam.  
XR CHEST SNGL V [[id number]] Collected: 01/12/20 7441 Order Status: Completed Updated: 01/12/20 8609 Narrative:    
 Portable view of the chest  
 
COMPARISON: January 12, 2020. CLINICAL HISTORY: Subclavian line placement FINDINGS: 
 
There is a new left subclavian catheter with the tip in the area of SVC/right 
atrium junction. Right [place]-Nallely catheter, enteric tube and endotracheal tube 
and left-sided chest tube are stable. There is vascular congestion. Bibasilar 
opacities, likely atelectasis. No significant pneumothorax. Cardiomediastinal 
contour and the surrounding bones are stable. Stable postsurgical changes of 
sternotomy. Impression:    
IMPRESSION: 
 
1. New left subclavian catheter. No significant pneumothorax. 2. Vascular congestion and bibasilar atelectasis. XR CHEST SNGL V [[id number]] Collected: 01/12/20 0301 Order Status: Completed Updated: 01/12/20 1688 Narrative:    
 Portable view of the chest  
 
Clinical history: Worsening hypercapnia, on vent COMPARISON: January 11, 2020. FINDINGS: 
 
Stable postsurgical changes of sternotomy. Endotracheal tube, enteric tube, 
right-sided [place]-Nallely catheter and left-sided chest tube are stable. There is 
small right apical pneumothorax. There is vascular congestion. Bibasilar 
opacities, likely atelectasis. Cardiomediastinal contour and the surrounding 
bones are stable. Impression:    
IMPRESSION: 
 
1. Small right apical pneumothorax, appearing slightly decreased since the prior 
exam. 
 
2. Vascular congestion and bibasilar atelectasis. XR CHEST SNGL V [[id number]] Order Status: Canceled XR CHEST SNGL V [[id number]] Collected: 01/11/20 1220 Order Status: Completed Updated: 01/11/20 1231 Narrative:    
PORTABLE CHEST, January 11, 2020 at 1200 hours CLINICAL HISTORY:  Follow-up right pneumothorax. COMPARISON:  0359 hours today.  
 
FINDINGS:  AP erect image demonstrates persistent small right apical 
pneumothorax.  The heart remains enlarged with pulmonary venous congestion and 
bibasilar atelectasis status post CABG.  Endotracheal, feeding, and left chest 
 tubes as well as [place]-Nallely catheter remain in place. Impression:    
IMPRESSION:  NO SIGNIFICANT INTERVAL CHANGE, INCLUDING THE SMALL RIGHT APICAL PNEUMOTHORAX. [address] [place] [address] [[id number]] Collected: 01/11/20 1122 Order Status: Completed Updated: 01/11/20 1127 Narrative:    
ULTRASOUND OF THE KIDNEYS AND BLADDER 
 
CLINICAL HISTORY:  Acute on chronic kidney disease. COMPARISON:  None. FINDINGS: The examination was limited by the patient's morbid obesity as well as 
limited mobility and ability to suspend respirations.  Multiple images from real 
time ultrasound evaluation of the kidneys show that they are normal in size and 
orientation bilaterally.  The right kidney measures 12.3 cm in length while the 
left measures 12.4 cm.  No definite solid renal mass, hydronephrosis, stone, or 
abnormal perinephric collection is seen. The bladder was not distended at the 
time of examination. Impression:    
IMPRESSION:  Unremarkable, technically limited examination. XR CHEST SNGL V [[id number]] Collected: 01/11/20 0636 Order Status: Completed Updated: 01/11/20 6758 Narrative:    
 Portable view of the chest  
 
COMPARISON: January 10, 2020 CLINICAL HISTORY: Postop. FINDINGS: 
 
Stable postsurgical changes of sternotomy. Endotracheal tube, enteric tube and 
right-sided Willis-Nallely catheter and left-sided chest tube are stable. There is 
vascular congestion. Bibasilar opacities, likely atelectasis. There is small 
right apical pneumothorax, similar to prior exam. Cardiac mediastinal contour 
and the surrounding bones are stable. Impression:    
IMPRESSION: 
 
1. Stable small right apical pneumothorax. 2. Vascular congestion and bibasilar atelectasis. No change in the appearance of 
the lungs. XR CHEST SNGL V [[id number]] Collected: 01/10/20 1617 Order Status: Completed Updated: 01/10/20 1623 Narrative:    
Chest, one view, 1/10/2020 Indication:Postop cardiovascular surgery Comparison:1/8/2020 There are post median sternotomy changes. Support apparatus including [place]-Nallely 
catheter tip projects over the proximal right pulmonary artery. Endotracheal 
tube about 6 cm above the radha. There is an NG tube tip of which is not 
included on this exam. There is also left-sided chest tube. No pneumothorax. Patient appears to have had an aortic valve f replacement. There is perihilar 
atelectasis and likely a small left effusion. Impression:    
Impression: Recent median sternotomy changes with support apparatus as detailed 
above. Perihilar and likely a small left effusion. ASSESSMENT: 
 
Problem List  Date Reviewed: 12/6/2019 Codes Class Noted HIT (heparin-induced thrombocytopenia) ([facility]) ICD-10-CM: P89.37 ICD-9-CM: 289.84  1/23/2020 Bilateral pneumothorax ICD-10-CM: J93.9 ICD-9-CM: 512.89  1/17/2020 Thrombocytopenia ([facility] ICD-10-CM: D69.6 ICD-9-CM: 287.5  1/17/2020 Shock ([facility] ICD-10-CM: R57.9 ICD-9-CM: 785.50  1/15/2020 Atrial fibrillation [facility]) ICD-10-CM: I48.91 
ICD-9-CM: 427.31  1/13/2020 Acute renal failure (ARF) ([facility]) ICD-10-CM: N17.9 ICD-9-CM: 584.9  1/11/2020 Aortic valve stenosis ICD-10-CM: I35.0 ICD-9-CM: 424.1  1/10/2020 * ([facility]) CAD (coronary artery disease) ICD-10-CM: I25.10 ICD-9-CM: 414.00  1/10/2020 Weaning from respirator [facility]) ICD-10-CM: Z99.11 ICD-9-CM: V46.13  1/10/2020 Acute hypoxemic respiratory failure ([facility] ICD-10-CM: J96.01 
ICD-9-CM: 518.81  1/10/2020 S/P CABG x 3 ICD-10-CM: Z95.1 ICD-9-CM: V45.81  1/10/2020 S/P AVR ICD-10-CM: Z95.2 ICD-9-CM: V43.3  1/10/2020 Bilateral carotid artery stenosis ICD-10-CM: I65.23 ICD-9-CM: 433.10, 433.30  11/24/2019 Overview Signed 11/24/2019  8:06 PM by Tramaine Luciano MD  
  1. Carotid Duplex (11/20/19): Bilateral 50-69% ICA stenosis. Type 2 diabetes with nephropathy ([facility] ICD-10-CM: E11.21 
ICD-9-CM: 250.40, 583.81  8/26/2019 Obesity, morbid ([facility] ICD-10-CM: E66.01 
ICD-9-CM: 278.01  10/19/2018 Nonrheumatic aortic valve stenosis ICD-10-CM: I35.0 ICD-9-CM: 424.1  4/13/2018 Overview Addendum 9/18/2019  6:16 PM by Mandie Altamirano MD  
  1. Echo (10/15/18): EF 55-60%. Moderate to severe Aortic Stenosis. MG 31.  PG 52.  DI 0.25. 
2.  Echo (9/11/19):  EF 60-65%. Mild LAE. SEvere AS. MG 40. PG 61. DI=0.23. Mild to moderate MRAlec DM type 2 (diabetes mellitus, type 2) (HCC) ICD-10-CM: E11.9 ICD-9-CM: 250.00  1/14/2013 Gout ICD-10-CM: M10.9 ICD-9-CM: 274.9  1/14/2013 HTN (hypertension) ICD-10-CM: I10 
ICD-9-CM: 401.9  1/14/2013 BPH (benign prostatic hyperplasia) ICD-10-CM: N40.0 ICD-9-CM: 600.00  1/14/2013 Seasonal allergic rhinitis ICD-10-CM: J30.2 ICD-9-CM: 477.9  1/14/2013 HLD (hyperlipidemia) ICD-10-CM: E78.5 ICD-9-CM: 272.4  1/14/2013 Mr. Anabel Moreland is a 69 y.o. male admitted on 1/10/2020. His PMH includes DM, thyroid disease, prior h/o CAD with recent [facility] 12/31/19 showing severe multivessel coronary artery disease in the setting of known severe aortic stenosis. Patient was seen by CTS and set up for surgery on 1/10/2020. Patient underwent CABG x3 (LIMA>LAD, SVG>OM, SVG>PDA) and aortic valve replacement with a 25-mm Intuity Jon-Gutierres valve. He became hypoxic, hypotensive and hard to ventilate. Pulmonary seeing patient and he remains intubated and sedated. He is also on pressors. Nephrology saw patient for worsening renal failure and CRRT was started on 1/12/2020. The patient went into AFib on 1/12 and was started on IV amiodarone. He was started on Azactam/Vanc for sepsis. CXR with increased atelectasis or infiltrate at both bases. BCx-NGTD. UCx-NG. Sputum Cx with light normal resp yessenia. On admission, WBC 16.9, Hgb 9.0, Plt 98k.   On 1/17, WBC 9. 3/ANC 7.6, Hgb 8.1, Plt 15k. Today platelets up to 95,047 without intervention. HIT/SHARON pending. Primary team changed heparin to argatroban. We were consulted for thrombocytopenia. 
  
PLAN: 
Thrombocytopenia / DVTs 
- Plt 98k on admission, down to 15k 
- marrow suppression from sepsis/shock likely contributing 
- HIT pending - Check DIC labs, hemolysis labs, and peripheral smear - Primary team has changed heparin to Argatroban - Transfuse plt prn 
01/18 Platelets have rebounded on their own, without transfusion to 33k today. DIC labs not impressive - repeat today for completeness. Smear and haptoglobin pending, follow. HIT/SHARON pending. As soon as HIT returns as negative, may stop argatroban as platelets are so low. His worsening thrombocytopenia is likely multifactorial related to recent clinical course, sepsis with transient marrow suppression and increased consumption, along with exposure to various medications including amiodarone.  Expect his platelets to improve over time as his clinical condition improves.  In interim he should be transfused to keep his platelets over 35,035. NXWQ improving. 01/21 HIT positive, SHARON+. Platelets down to 42Y today. Positive upper and lower extremity DVT's. Continue argatroban. No platelets unless bleeding. 1/27 platelets have improved without transfusion 97k today 
  
Anemia - Transfuse prn to keep Hgb >8 
- DEJUAN on CRRT / marrow suppression from sepsis/shock - Tsat 17%, ferritin 557. Check folate, B12, hemolysis labs 01/19 Continue to follow haptoglobin and smear. 1/21 Hgb stable, 8.4. No hemolysis noted. No schistocytes seen on smear. 1/27 Hgb stable 9.1. now on EPO per nephrology 
  
DEJUAN 
- Neph following, on CRRT 
  
Sepsis/Shock 
- on pressor support - On Azactam/Vanc - Cultures NGTD 
1/21 No longer on antibx. Cultures neg. Continues on pressor support. 1/27 off antibiotic. No longer requiring pressor support 
  
Hypoxia  
- intubated on vent - Pulm following 1/21 remains intubated on vent 1/27 now extubated on hiflow oxygen 
  
Afib - Cards managing 
  
CAD 
- s/p CABG 
 
1/27/2019 We were asked to revisit patient for recommendations transitioning patient from argatroban to appropriate anticoagulant. DOACs are not recommended in obese patients >120 kg due to the lack of clinical data in this population therefore we recommend using coumadin. Warfarin should be started in a patient with HIT only when both of the following have been accomplished: ?The patient has been stably anticoagulated with an alternative anticoagulant, and ? The platelet count has increased to at least 150,000/microL There should be a minimum of five days of overlapping therapy and the international normalized ratio (INR) should be in the target range before the alternative anticoagulant is discontinued. The starting dose of warfarin should be a low maintenance dose of ?5 mg/day rather than a high initial or loading dose (eg, ?10 mg/day), to minimize the transient hypercoagulable state induced by the rapid decline in protein C levels Neeraj Osborne  [address] Hematology Oncology [address] [place] [address] Office : [phone] Fax : [phone]

## 2020-01-27 NOTE — PROGRESS NOTES
A follow up visit was made to the patient. Emotional support, spiritual presence and  
prayer were provided for the patient. EDMOND [place]ell

## 2020-01-27 NOTE — PROGRESS NOTES
[facility] PROGRESS NOTE Follow up for: 
 
Subjective:  
Patient seen and examined at bedside. Feels weak ROS: 
Gen - no fever, no chills CV - no chest pain, no orthopnea Lung - + shortness of breath, no cough Abd - no tenderness, no nausea, no vomiting Ext - + edema Objective:  
Exam: 
Vitals:  
 01/27/20 4595 01/27/20 0459 01/27/20 0557 01/27/20 0559 BP: 121/56 126/58  146/74 Pulse: 77 77  82 Resp: 18 16  28 Temp:      
SpO2: 99% 99%  100% Weight:   140.9 kg (310 lb 10.1 oz) Height:      
 
 
 
Intake/Output Summary (Last 24 hours) at 1/27/2020 6389 Last data filed at 1/27/2020 7089 Gross per 24 hour Intake 1008.9 ml Output 503 ml Net 505.9 ml  
 
 
Current Facility-Administered Medications Medication Dose Route Frequency  insulin regular (NOVOLIN R, HUMULIN R) injection 0-15 Units  0-15 Units SubCUTAneous AC&HS  ondansetron (ZOFRAN) injection 4 mg  4 mg IntraVENous Q6H PRN  
 busPIRone (BUSPAR) tablet 10 mg  10 mg Oral TID  morphine 10 mg/ml injection 5 mg  5 mg IntraVENous Q4H PRN  
 oxyCODONE-acetaminophen (PERCOCET) 5-325 mg per tablet 1 Tab  1 Tab Per NG tube Q4H PRN  
 amiodarone (CORDARONE) 450 mg in dextrose 5% 250 mL infusion  0.5-1 mg/min IntraVENous TITRATE  traMADol (ULTRAM) tablet 50 mg  50 mg Oral Q6H PRN  
 famotidine (PEPCID) 40 mg/5 mL (8 mg/mL) oral suspension 20 mg  20 mg Per NG tube DAILY  amiodarone (CORDARONE) tablet 400 mg  400 mg Per NG tube Q12H  
 epoetin maritza-epbx (RETACRIT) 14,000 Units combo injection  14,000 Units SubCUTAneous Q7D  
 argatroban 50 mg in 0.9% sodium chloride 50 mL (1000 mcg/mL) infusion  0.5-10 mcg/kg/min IntraVENous TITRATE  alcohol 62% (NOZIN) nasal  1 Ampule  1 Ampule Topical Q12H  
 NUTRITIONAL SUPPORT ELECTROLYTE PRN ORDERS   Does Not Apply PRN  
 NOREPINephrine (LEVOPHED) 16,000 mcg in dextrose 5% 250 mL infusion  0.05-0.2 mcg/kg/min IntraVENous TITRATE  albuterol (PROVENTIL VENTOLIN) nebulizer solution 2.5 mg  2.5 mg Nebulization QID PRN  
 acetaminophen (TYLENOL) solution 650 mg  650 mg Per NG tube Q4H PRN  
 sodium chloride (NS) flush 5-40 mL  5-40 mL IntraVENous Q8H  
 sodium chloride (NS) flush 5-40 mL  5-40 mL IntraVENous PRN  
 naloxone (NARCAN) injection 0.4 mg  0.4 mg IntraVENous PRN  
 [Held by provider] metoprolol tartrate (LOPRESSOR) tablet 25 mg  25 mg Oral Q12H  
 atorvastatin (LIPITOR) tablet 80 mg  80 mg Oral QHS  dextrose (D50W) injection syrg 12.5 g  25 mL IntraVENous PRN  
 [Held by provider] aspirin chewable tablet 81 mg  81 mg Oral DAILY  magnesium sulfate 1 g/100 ml IVPB (premix or compounded)  1 g IntraVENous PRN  
 midazolam (VERSED) injection 1 mg  1 mg IntraVENous Q1H PRN  
 sodium bicarbonate (8.4%) injection 50 mEq  50 mEq IntraVENous PRN  
 
 
EXAM 
GEN - Alert and awake CV - S1, S2, RRR, no rub, murmur, or gallop Lung - clear anteriorly, diminished at bases Abd - soft, nontender, BS present Ext - diffuse anasarca Recent Labs  
  01/27/20 
0242 01/26/20 
0347 01/25/20 
0325 WBC 16.2* 16.6* 16.5* HGB 9.1* 9.0* 8.8* HCT 29.8* 29.0* 29.0*  
PLT 97* 48* 43* Recent Labs  
  01/27/20 
0242 01/26/20 
0347 01/25/20 
0325  142 142  
K 4.2 3.9 4.3  106 106 CO2 30 32 33*  
BUN 21 6* 17  
CREA 2.66* 0.91 1.67* CA 8.3 7.8* 7.7*  
* 172* 170* MG 2.0  --  1.8 PHOS 3.5  --   --   
 
 
Assessment and Plan:  
1) DEJUAN on CKD-  On SLED until yesterday morning. Still oliguric. More HD stable. Will transition to HD today. 2) HTN/Volume- off pressors. signficantly volume overloaded. UF as tolerated with HD today. 3) Anemia- Hb stable. Monitoring. 4) Respiratory failure- extubated on HHFNC. UF with HD today. 5) S/p CABG Jayna Figueroa MD  
 
 
Seen on HD this afternoon. HD stable and tolerating UF. Will assess daily for dialysis need.

## 2020-01-27 NOTE — PROGRESS NOTES
While on HD, had sudden panic attack like event. Now in AF with RVR with HR 1320-139 and RR-40-44, states \"can't breath\", LS with some wheezing and LLL diminished. RT at bedside and treatment initiated. Within 5 minutes (and after calming down), breathing improved. SPO2 remained > 94% throughout but was difficult to obtain as there are limited digits to place SPO2 probe. Continues on HD.

## 2020-01-27 NOTE — DIALYSIS
Hemodialysis treatment initiated using Left CVC. Aspirated and flushed both ports without problem. Dressing clean, dry and intact. Pt alert and VS stable. Machine settings per MD order. Will monitor during treatment.

## 2020-01-27 NOTE — PROGRESS NOTES
POD 17 Days Post-Op Procedure:  Procedure(s): CORONARY ARTERY BYPASS GRAFT WITH AVR/ (CABG X 3 )/ LIMA VEIN HARVEST/ GREATER SAPHENOUS 
ESOPHAGEAL TRANS ECHOCARDIOGRAM 
 
 
Subjective:  
 
Patient has No significant medical complaints Objective:  
 
Patient Vitals for the past 8 hrs: 
 BP Temp Pulse Resp SpO2 Weight  
20 0559 146/74  82 28 100 %   
20 0557      310 lb 10.1 oz (140.9 kg) 20 0459 126/58  77 16 99 %   
20 0359 121/56  77 18 99 %   
20 0300 122/60 97.8 °F (36.6 °C) (!) 124 20 98 %   
20 0159 127/58  80 17 96 %   
20 0059 121/59  79 16 99 %   
20 2359 124/58  80 19 100 %  Temp (24hrs), Av.4 °F (36.9 °C), Min:97.8 °F (36.6 °C), Max:99.2 °F (37.3 °C) Hemodynamics PAP Systolic: 50 PAP 
CO (l/min): 7.5 l/min CO 
CI (l/min/m2): 2.9 l/min/m2 CI No intake/output data recorded.  1901 -  0700 In: 1869.7 [P.O.:480; I.V.:964.7] Out: 2769 [Urine:135; Drains:100] CT Drainage 
  
  
   
  total of all CT's Heart:  regular rate and rhythm, S1, S2 normal, no murmur, click, rub or gallop Lung:  clear to auscultation bilaterally Neuro: Grossly non focal 
Incisions: Clean, dry, and intact Labs: 
Recent Results (from the past 24 hour(s)) GLUCOSE, POC Collection Time: 20 11:32 AM  
Result Value Ref Range Glucose (POC) 172 (H) 65 - 100 mg/dL GLUCOSE, POC Collection Time: 20  4:50 PM  
Result Value Ref Range Glucose (POC) 179 (H) 65 - 100 mg/dL GLUCOSE, POC Collection Time: 20  9:32 PM  
Result Value Ref Range Glucose (POC) 167 (H) 65 - 100 mg/dL GLUCOSE, POC Collection Time: 20  2:34 AM  
Result Value Ref Range Glucose (POC) 194 (H) 65 - 100 mg/dL MAGNESIUM Collection Time: 20  2:42 AM  
Result Value Ref Range Magnesium 2.0 1.8 - 2.4 mg/dL METABOLIC PANEL, BASIC Collection Time: 20  2:42 AM  
Result Value Ref Range Sodium 141 136 - 145 mmol/L Potassium 4.2 3.5 - 5.1 mmol/L Chloride 103 98 - 107 mmol/L  
 CO2 30 21 - 32 mmol/L Anion gap 8 7 - 16 mmol/L Glucose 184 (H) 65 - 100 mg/dL BUN 21 8 - 23 MG/DL Creatinine 2.66 (H) 0.8 - 1.5 MG/DL  
 GFR est AA 30 (L) >60 ml/min/1.73m2 GFR est non-AA 25 (L) >60 ml/min/1.73m2 Calcium 8.3 8.3 - 10.4 MG/DL  
PTT Collection Time: 01/27/20  2:42 AM  
Result Value Ref Range aPTT 63.2 (H) 24.7 - 39.8 SEC PHOSPHORUS Collection Time: 01/27/20  2:42 AM  
Result Value Ref Range Phosphorus 3.5 2.3 - 3.7 MG/DL  
CBC W/O DIFF Collection Time: 01/27/20  2:42 AM  
Result Value Ref Range WBC 16.2 (H) 4.3 - 11.1 K/uL  
 RBC 3.02 (L) 4.23 - 5.6 M/uL HGB 9.1 (L) 13.6 - 17.2 g/dL HCT 29.8 (L) 41.1 - 50.3 % MCV 98.7 (H) 79.6 - 97.8 FL  
 MCH 30.1 26.1 - 32.9 PG  
 MCHC 30.5 (L) 31.4 - 35.0 g/dL  
 RDW 15.4 (H) 11.9 - 14.6 % PLATELET 97 (L) 050 - 450 K/uL MPV 11.4 9.4 - 12.3 FL ABSOLUTE NRBC 0.00 0.0 - 0.2 K/uL GLUCOSE, POC Collection Time: 01/27/20  6:31 AM  
Result Value Ref Range Glucose (POC) 214 (H) 65 - 100 mg/dL Assessment:  
 
Principal Problem: 
  CAD (coronary artery disease) (1/10/2020) Active Problems: Aortic valve stenosis (1/10/2020) Weaning from respirator [facility]) (1/10/2020) Acute hypoxemic respiratory failure ([address] (1/10/2020) S/P CABG x 3 (1/10/2020) S/P AVR (1/10/2020) Acute renal failure (ARF) ([address] (1/11/2020) Atrial fibrillation ([address] (1/13/2020) Shock ([address] (1/15/2020) Bilateral pneumothorax (1/17/2020) Thrombocytopenia ([address] (1/17/2020) HIT (heparin-induced thrombocytopenia) ([address] (1/23/2020) Plan/Recommendations/Medical Decision Making:  
 
Ambulate today, increase po, plts 97K, supportive care See orders

## 2020-01-28 NOTE — DIALYSIS
Hemodialysis treatment completed without complications. Patient w/o change and VS stable  /62  P 85   
 
 3.7 Kgs removed. Flushed both ports with 10 mL of NS.  CVC dressing clean, dry, and intact, tego caps intact, bilateral lumens wrapped with 4x4 gauze. Patient remains in room

## 2020-01-28 NOTE — PROGRESS NOTES
A follow up visit was made to the patient. Emotional support, spiritual presence and  
prayer were provided for the patient. He was alert and oriented. He was thankful for the prayer. EDMOND Nguyễn

## 2020-01-28 NOTE — PROGRESS NOTES
Yariel Soclair Admission Date: 1/10/2020 Daily Progress Note: 1/28/2020 The patient's chart is reviewed and the patient is discussed with the staff. Patient had CABG x 3 and AVR on 1/10.  Slow to improve. Had small ptx that self resolved. 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 Subjective:  
 
Still on optiflow 50 L /50 %. Awake and alert. Did not like BOBBY boots. Still has cool right toes and some fingers. No wheezing. No dyspnea. Using his home CPAP but having some trouble with it. Review of Systems: 
-Fever 
-Headaches 
-Chest pain 
-Dyspnea, -wheezing,- cough 
-Abdominal pain, -constipation +Leg swelling +cool fingers/toes All other organ systems grossly normal. 
 
 
 
Current Facility-Administered Medications Medication Dose Route Frequency  albuterol-ipratropium (DUO-NEB) 2.5 MG-0.5 MG/3 ML  3 mL Nebulization QID RT  
 insulin regular (NOVOLIN R, HUMULIN R) injection 0-15 Units  0-15 Units SubCUTAneous AC&HS  ondansetron (ZOFRAN) injection 4 mg  4 mg IntraVENous Q6H PRN  
 busPIRone (BUSPAR) tablet 10 mg  10 mg Oral TID  morphine 10 mg/ml injection 5 mg  5 mg IntraVENous Q4H PRN  
 oxyCODONE-acetaminophen (PERCOCET) 5-325 mg per tablet 1 Tab  1 Tab Per NG tube Q4H PRN  
 amiodarone (CORDARONE) 450 mg in dextrose 5% 250 mL infusion  0.5-1 mg/min IntraVENous TITRATE  traMADol (ULTRAM) tablet 50 mg  50 mg Oral Q6H PRN  
 famotidine (PEPCID) 40 mg/5 mL (8 mg/mL) oral suspension 20 mg  20 mg Per NG tube DAILY  amiodarone (CORDARONE) tablet 400 mg  400 mg Per NG tube Q12H  
 epoetin maritza-epbx (RETACRIT) 14,000 Units combo injection  14,000 Units SubCUTAneous Q7D  
 argatroban 50 mg in 0.9% sodium chloride 50 mL (1000 mcg/mL) infusion  0.5-10 mcg/kg/min IntraVENous TITRATE  alcohol 62% (NOZIN) nasal  1 Ampule  1 Ampule Topical Q12H  
 NUTRITIONAL SUPPORT ELECTROLYTE PRN ORDERS   Does Not Apply PRN  
 NOREPINephrine (LEVOPHED) 16,000 mcg in dextrose 5% 250 mL infusion  0.05-0.2 mcg/kg/min IntraVENous TITRATE  acetaminophen (TYLENOL) solution 650 mg  650 mg Per NG tube Q4H PRN  
 sodium chloride (NS) flush 5-40 mL  5-40 mL IntraVENous Q8H  
 sodium chloride (NS) flush 5-40 mL  5-40 mL IntraVENous PRN  
 naloxone (NARCAN) injection 0.4 mg  0.4 mg IntraVENous PRN  
 [Held by provider] metoprolol tartrate (LOPRESSOR) tablet 25 mg  25 mg Oral Q12H  
 atorvastatin (LIPITOR) tablet 80 mg  80 mg Oral QHS  dextrose (D50W) injection syrg 12.5 g  25 mL IntraVENous PRN  
 [Held by provider] aspirin chewable tablet 81 mg  81 mg Oral DAILY  magnesium sulfate 1 g/100 ml IVPB (premix or compounded)  1 g IntraVENous PRN  
 midazolam (VERSED) injection 1 mg  1 mg IntraVENous Q1H PRN  
 sodium bicarbonate (8.4%) injection 50 mEq  50 mEq IntraVENous PRN Objective:  
 
Vitals:  
 01/28/20 5479 01/28/20 0330 01/28/20 0359 01/28/20 0430 BP: 121/55 120/56 133/63 149/65 Pulse: 74 74 77 75 Resp: (!) 32 17 16 25 Temp:      
SpO2: 99% 98% 98% 96% Weight:      
Height:      
 
Intake and Output:  
01/26 0701 - 01/27 1900 In: 1188.9 [P.O.:660; I.V.:528.9] Out: 1093 [Urine:120] 01/27 1901 - 01/28 0700 In: 791.3 [P.O.:450; I.V.:341.3] Out: - Physical Exam:  
Constitution:  the patient is well developed and in NO respiratory distress on optiflow EENMT:  Sclera clear, pupils equal, oral mucosa moist 
Respiratory: decreased sounds in left base Cardiovascular:  RRR without M,G,R 
Gastrointestinal: soft and non-tender; with positive bowel sounds. Musculoskeletal: moving all limbs. There is gross edema b/l. Noted cyanosis of fingers and toes. Left foot is warmer today, right toes are still cold. Finger time -- some on both hands are cold and cyanotic. Skin:  no jaundice or rashes, surgical wounds Neurologic: no gross neuro deficits Psychiatric:  Awake and alert. CHEST XRAY:  Today with mild congestion and left pleural effusion and CM 
 
 
 
1/23/20 IMPRESSION: 
Stable abnormal exam with tubes and lines as described above Atherosclerosis LAB No lab exists for component: [place] Recent Labs  
  01/28/20 
0321 01/27/20 
0242 01/26/20 
0248 WBC 16.4* 16.2* 16.6* HGB 8.6* 9.1* 9.0*  
HCT 28.1* 29.8* 29.0*  
* 97* 48* Recent Labs  
  01/28/20 
0321 01/27/20 
0242 01/26/20 
7542  141 142  
K 4.4 4.2 3.9  103 106 CO2 28 30 32 * 184* 172* BUN 28* 21 6*  
CREA 3.10* 2.66* 0.91  
MG  --  2.0  --   
CA 8.2* 8.3 7.8* PHOS  --  3.5  --   
 
ABG:   
No results found for: PH, PHI, PCO2, PCO2I, PO2, PO2I, HCO3, HCO3I, FIO2, FIO2I Assessment:  (Medical Decision Making) Hospital Problems  Date Reviewed: 12/6/2019 Codes Class Noted POA  
 HIT (heparin-induced thrombocytopenia) ([facility]) ICD-10-CM: F19.21 ICD-9-CM: 289.84  1/23/2020 Unknown Bilateral pneumothorax ICD-10-CM: J93.9 ICD-9-CM: 512.89  1/17/2020 Unknown Thrombocytopenia ([facility] ICD-10-CM: D69.6 ICD-9-CM: 287.5  1/17/2020 Unknown Shock ([facility] ICD-10-CM: R57.9 ICD-9-CM: 785.50  1/15/2020 Unknown Atrial fibrillation [facility]) ICD-10-CM: I48.91 
ICD-9-CM: 427.31  1/13/2020 Unknown Acute renal failure (ARF) ([facility]) ICD-10-CM: N17.9 ICD-9-CM: 584.9  1/11/2020 Unknown Aortic valve stenosis ICD-10-CM: I35.0 ICD-9-CM: 424.1  1/10/2020 Unknown * (Principal) CAD (coronary artery disease) ICD-10-CM: I25.10 ICD-9-CM: 414.00  1/10/2020 Unknown Weaning from respirator [facility]) extubated ICD-10-CM: Z99.11 ICD-9-CM: V46.13  1/10/2020 Unknown Acute hypoxemic respiratory failure ([facility] ICD-10-CM: J96.01 
ICD-9-CM: 518.81  1/10/2020 S/P CABG x 3 ICD-10-CM: Z95.1 ICD-9-CM: V45.81  1/10/2020 Unknown S/P AVR ICD-10-CM: Z95.2 ICD-9-CM: V43.3  1/10/2020 Unknown Patient with prolonged intubation s/p cabg and avr. Current indications are that he would likely fail extubation with his severe tachypnea and low tidal volumes. HIT + both on ROSA and SHARON. LE doppler with B lower extremity clot. Renal failure so unable to perform CT PE. Plan:  (Medical Decision Making) -- continue optilfow ,wean as tolerated. Trying to use his home CPAP machine and having some difficulty. Told to use at AM to see if he can get tolerant of it. He is in agreeement. -- IS, pulmomary toilet -- HD per renal  
-- continue argatroban and adjust per hematology -- mobilize ,PT 
-- get Vascular to see in AM since quit concerned about losing toes -- if ok with PMD 
--continue remaining treatment.  
 
  
 
 
 
Destiney San MD

## 2020-01-28 NOTE — DIALYSIS
Consent verified for renal replacement therapy. HD initiated using LTPC 3K bath. Machine settings per MD order. Pt alert, oriented to self. Will monitor during treatment.

## 2020-01-28 NOTE — PROGRESS NOTES
Problem: Mobility Impaired (Adult and Pediatric) Goal: *Acute Goals and Plan of Care (Insert Text) Description STG: 
(1.)Patient will roll side to side in bed with  MAXIMAL ASSIST within 3-5 day(s). (2.)Patient will move from supine to sit and sit to supine  in bed with  MAXIMAL ASSIST within 3-5 day(s). (3.)Patient will sit edge of bed/chair with STAND BY ASSIST and good balance statically  for 10 minutes within 3-5 day(s). 4.  Patient will perform 1 sets of 10 repetitions of active range of motion exercises for bilateral lower extremity(s) with  cueing within 3-5 day(s). 5.  Patient will tolerate sitting with bed in chair position for 2 hour 2x/day to facilitate tolerance to sitting in chair within 3-5 days. LTG: 
(1.)Patient will move from supine to sit and sit to supine  and roll side to side in bed with  MODERATE ASSIST  within 7-10 day(s). (2.)Patient will transfer from bed to chair and chair to bed with  MAXIMAL ASSIST using the least restrictive device within 7-10 day(s). (3.)Patient will stand with  MINIMAL ASSIST for 2 minutes with the least restrictive device within 7-10 day(s). 4.  Patient will exhibit 3/5 strength B LE's to facilitate increased independence with bed mobility within 7-10 days. Outcome: Progressing Towards Goal 
  
PHYSICAL THERAPY: Daily Note and PM 1/28/2020 INPATIENT: PT Visit Days : 3 Payor: [employer] / Plan: Efrain Peñaloza / Product Type: [employer] Medicare /   
  
NAME/AGE/GENDER: Jess Rico is a 69 y.o. male PRIMARY DIAGNOSIS: Atherosclerosis of native coronary artery of native heart with unstable angina pectoris ([facility] [I25.110] Nonrheumatic aortic valve stenosis [I35.0] CAD (coronary artery disease) [I25.10] Aortic valve stenosis [I35.0] CAD (coronary artery disease) CAD (coronary artery disease) Procedure(s) (LRB): 
CORONARY ARTERY BYPASS GRAFT WITH AVR/ (CABG X 3 )/ LIMA (N/A) VEIN HARVEST/ GREATER SAPHENOUS (Left) ESOPHAGEAL TRANS ECHOCARDIOGRAM (N/A) 18 Days Post-Op ICD-10: Treatment Diagnosis:  
 · Other abnormalities of gait and mobility (R26.89) Precaution/Allergies: 
Heparin; Amoxicillin; Keflex [cephalexin]; and Pcn [penicillins] ASSESSMENT:  
 
Mr. Luther Gil underwent above surgery on 1/10/20 and was only recently extubated. He lives with his wife and ambulates independently with cane or RW at baseline. 1/28/20 pm: Pt seen in CVICU, sitting in chair position on arrival, on arivo. Pt with fair static sitting balance, prop sitting EOB. Pt with max A x 2 for sit to stand attempts at EOB, blocking knees for support. Pt unable to stand fully upright, verbal and tactile cues for posture. Pt with BM in standing, able to stand 30-1 min for cleaning, returned to sitting. Pt attempted sit to stand again with max A x 2, unable to stand as long, poor posture. Pt with glazed expression, decreased response to staff, returned to supine immediately with total a x 2-3. Pt with elevated HR, low BP. Pt left with RN at bedside. PT to cont to follow for acute care needs. Pt making little progress this date. This section established at most recent assessment PROBLEM LIST (Impairments causing functional limitations): 1. Decreased Strength 2. Decreased ADL/Functional Activities 3. Decreased Transfer Abilities 4. Decreased Ambulation Ability/Technique 5. Decreased Balance 6. Decreased Activity Tolerance 7. Increased Fatigue 8. Increased Shortness of Breath 9. Decreased Knowledge of Precautions INTERVENTIONS PLANNED: (Benefits and precautions of physical therapy have been discussed with the patient.) 1. Balance Exercise 2. Bed Mobility 3. Neuromuscular Re-education/Strengthening 4. Therapeutic Activites 5. Therapeutic Exercise/Strengthening 6. Transfer Training 7. education TREATMENT PLAN: Frequency/Duration: twice daily for duration of hospital stay Rehabilitation Potential For Stated Goals: Good REHAB RECOMMENDATIONS (at time of discharge pending progress):   
Placement: It is my opinion, based on this patient's performance to date, that Mr. Yoana Moore may benefit from intensive therapy at a [facility] after discharge due to the functional deficits listed above that are likely to improve with skilled rehabilitation and severe debility. Equipment:  
? tbd  
? None at this time HISTORY:  
History of Present Injury/Illness (Reason for Referral): 
Patient admitted for above surgery. Past Medical History/Comorbidities:  
Mr. Yoana Moore  has a past medical history of Arthritis, BPH (benign prostatic hyperplasia) (1/14/2013), CAD (coronary artery disease), DM type 2 (diabetes mellitus, type 2) ([address] (dx 2004), Dyspnea, Gout (1/14/2013), HLD (hyperlipidemia) (1/14/2013), HTN (hypertension), Morbid obesity ([address] (9/3/14), Psychiatric disorder, Rheumatic fever, Seasonal allergic rhinitis, Severe aortic valve stenosis, Thyroid disease, and Unspecified sleep apnea (2016). Mr. Yoana Moore  has a past surgical history that includes hx tonsil and adenoidectomy; hx heart catheterization (12/23/2019); hx orthopaedic (Left); and hx cataract removal (Bilateral, 2012). Social History/Living Environment:  
Home Environment: Private residence One/Two Story Residence: One story Living Alone: No 
Support Systems: Spouse/Significant Other/Partner Patient Expects to be Discharged to[de-identified] Private residence Current DME Used/Available at Home: Cane, straight Prior Level of Function/Work/Activity: 
 
He lives with his wife and ambulates independently with cane or RW at baseline. Number of Personal Factors/Comorbidities that affect the Plan of Care: 3+: HIGH COMPLEXITY EXAMINATION:  
Most Recent Physical Functioning:  
Gross Assessment: 
  
         
  
Posture: 
Posture (WDL): Exceptions to [facility] Posture Assessment: Forward head, Rounded shoulders Balance: Sitting: Impaired Sitting - Static: Fair (occasional); Prop sitting Sitting - Dynamic: Poor (constant support) Standing: Impaired Standing - Static: Constant support;Poor Bed Mobility: 
Supine to Sit: (sitting chair position in bed) Sit to Supine: Total assistance;Assist x2 Scooting: Total assistance;Assist x2 Wheelchair Mobility: 
  
Transfers: 
Sit to Stand: Maximum assistance;Assist x2(stood maybe 30 sec to 1 min) Stand to Sit: Maximum assistance;Assist x2 Gait: 
  
   
  
Body Structures Involved: 1. Heart 2. Lungs 3. Muscles Body Functions Affected: 1. Cardio 2. Respiratory 3. Neuromusculoskeletal 
4. Movement Related Activities and Participation Affected: 1. Mobility 2. Self Care 3. Domestic Life 4. Community, Social and [place] Number of elements that affect the Plan of Care: 4+: HIGH COMPLEXITY CLINICAL PRESENTATION:  
Presentation: Evolving clinical presentation with changing clinical characteristics: MODERATE COMPLEXITY CLINICAL DECISION MAKIN[address] [place] [id number] AM-PAC 6 Clicks Basic Mobility Inpatient Short Form How much difficulty does the patient currently have. .. Unable A Lot A Little None 1. Turning over in bed (including adjusting bedclothes, sheets and blankets)? [x] 1   [] 2   [] 3   [] 4  
2. Sitting down on and standing up from a chair with arms ( e.g., wheelchair, bedside commode, etc.)   [x] 1   [] 2   [] 3   [] 4  
3. Moving from lying on back to sitting on the side of the bed? [x] 1   [] 2   [] 3   [] 4 How much help from another person does the patient currently need. .. Total A Lot A Little None 4. Moving to and from a bed to a chair (including a wheelchair)? [x] 1   [] 2   [] 3   [] 4  
5. Need to walk in hospital room? [x] 1   [] 2   [] 3   [] 4  
6. Climbing 3-5 steps with a railing? [x] 1   [] 2   [] 3   [] 4  
© , Trustees of [address] [place] [address] [address] [id number], under license to [employer]. All rights reserved Score:  Initial: 6 Most Recent: X (Date: -- ) Interpretation of Tool:  Represents activities that are increasingly more difficult (i.e. Bed mobility, Transfers, Gait). Medical Necessity:    
· Patient is expected to demonstrate progress in  
· strength, range of motion, balance, coordination, functional technique, and activity tolerance ·  to  
· decrease assistance required with all functional mobility · . Reason for Services/Other Comments: 
· Patient continues to require skilled intervention due to · medical complications and patient unable to attend/participate in therapy as expected · . Use of outcome tool(s) and clinical judgement create a POC that gives a: Questionable prediction of patient's progress: MODERATE COMPLEXITY  
  
 
 
 
TREATMENT:  
(In addition to Assessment/Re-Assessment sessions the following treatments were rendered) Pre-treatment Symptoms/Complaints: \"Lets try\" Pain: Initial:  
Pain Intensity 1: 0  Post Session:  0/10 post session Therapeutic Activity: (    16 minutes): Therapeutic activities including Bed transfers, sit to stand transfers, static standing balance, posture ground to improve mobility, strength and balance. Required moderate cues for breathing while standing   to promote dynamic balance in standing. DATE: 1/26/20 Straight leg raise Hip abduct/ adduct X5 AAB Heel slides  X3 AB Hip external/ internal rotation Ankle dorsiflexion/ plantarflexion X5 AB Sitting unsupported x5' Key:  A=active, AA=active assisted, P=passive, B=bilaterally, R=right, L=left Braces/Orthotics/Lines/Etc:  
· IV 
· brown catheter · Wound vac · O2 Device: Heated, Hi flow nasal cannula Treatment/Session Assessment:   
· Response to Treatment:  Fatigued after treatment, decreased responses · Interdisciplinary Collaboration:  
o Physical Therapist 
o Registered Nurse · After treatment position/precautions:  
o Supine in bed 
o Bed/Chair-wheels locked 
o Call light within reach 
o Nurse at bedside · Compliance with Program/Exercises: Will assess as treatment progresses · Recommendations/Intent for next treatment session: \"Next visit will focus on advancements to more challenging activities and reduction in assistance provided\". Total Treatment Duration: PT Patient Time In/Time Out Time In: 1330 Time Out: 1346 Ty Nicole, PT

## 2020-01-28 NOTE — PROGRESS NOTES
[facility] PROGRESS NOTE Follow up for: 
 
Subjective:  
Patient seen and examined at bedside. Did well with HD yesterday. ROS: 
Gen - no fever, no chills CV - no chest pain, no orthopnea Lung - + shortness of breath, no cough Abd - no tenderness, no nausea, no vomiting Ext - + edema Objective:  
Exam: 
Vitals:  
 01/28/20 0730 01/28/20 0757 01/28/20 0759 01/28/20 0830 BP: 125/60  133/63 125/55 Pulse: 80  80 80 Resp: 20 28 21 Temp:      
SpO2: 100% 98% 100% 99% Weight:      
Height:      
 
 
 
Intake/Output Summary (Last 24 hours) at 1/28/2020 1672 Last data filed at 1/28/2020 5240 Gross per 24 hour Intake 1447.75 ml Output 4035 ml Net -2587.25 ml  
 
 
Current Facility-Administered Medications Medication Dose Route Frequency  albuterol-ipratropium (DUO-NEB) 2.5 MG-0.5 MG/3 ML  3 mL Nebulization QID RT  
 amiodarone (CORDARONE) tablet 400 mg  400 mg Oral Q12H  
 [START ON 1/29/2020] famotidine (PEPCID) tablet 20 mg  20 mg Oral DAILY  insulin regular (NOVOLIN R, HUMULIN R) injection 0-15 Units  0-15 Units SubCUTAneous AC&HS  ondansetron (ZOFRAN) injection 4 mg  4 mg IntraVENous Q6H PRN  
 busPIRone (BUSPAR) tablet 10 mg  10 mg Oral TID  morphine 10 mg/ml injection 5 mg  5 mg IntraVENous Q4H PRN  
 oxyCODONE-acetaminophen (PERCOCET) 5-325 mg per tablet 1 Tab  1 Tab Per NG tube Q4H PRN  
 amiodarone (CORDARONE) 450 mg in dextrose 5% 250 mL infusion  0.5-1 mg/min IntraVENous TITRATE  traMADol (ULTRAM) tablet 50 mg  50 mg Oral Q6H PRN  
 epoetin maritza-epbx (RETACRIT) 14,000 Units combo injection  14,000 Units SubCUTAneous Q7D  
 argatroban 50 mg in 0.9% sodium chloride 50 mL (1000 mcg/mL) infusion  0.5-10 mcg/kg/min IntraVENous TITRATE  alcohol 62% (NOZIN) nasal  1 Ampule  1 Ampule Topical Q12H  
 NUTRITIONAL SUPPORT ELECTROLYTE PRN ORDERS   Does Not Apply PRN  
 NOREPINephrine (LEVOPHED) 16,000 mcg in dextrose 5% 250 mL infusion 0. 05-0.2 mcg/kg/min IntraVENous TITRATE  acetaminophen (TYLENOL) solution 650 mg  650 mg Per NG tube Q4H PRN  
 sodium chloride (NS) flush 5-40 mL  5-40 mL IntraVENous Q8H  
 sodium chloride (NS) flush 5-40 mL  5-40 mL IntraVENous PRN  
 naloxone (NARCAN) injection 0.4 mg  0.4 mg IntraVENous PRN  
 [Held by provider] metoprolol tartrate (LOPRESSOR) tablet 25 mg  25 mg Oral Q12H  
 atorvastatin (LIPITOR) tablet 80 mg  80 mg Oral QHS  dextrose (D50W) injection syrg 12.5 g  25 mL IntraVENous PRN  
 [Held by provider] aspirin chewable tablet 81 mg  81 mg Oral DAILY  magnesium sulfate 1 g/100 ml IVPB (premix or compounded)  1 g IntraVENous PRN  
 midazolam (VERSED) injection 1 mg  1 mg IntraVENous Q1H PRN  
 sodium bicarbonate (8.4%) injection 50 mEq  50 mEq IntraVENous PRN  
 
 
EXAM 
GEN - Alert and awake CV - S1, S2, RRR, no rub, murmur, or gallop Lung - clear anteriorly, diminished at bases Abd - soft, nontender, BS present Ext - diffuse anasarca Recent Labs  
  01/28/20 
0321 01/27/20 
0242 01/26/20 
4294 WBC 16.4* 16.2* 16.6* HGB 8.6* 9.1* 9.0*  
HCT 28.1* 29.8* 29.0*  
* 97* 48* Recent Labs  
  01/28/20 
0321 01/27/20 
0242 01/26/20 
1910  141 142  
K 4.4 4.2 3.9  103 106 CO2 28 30 32 BUN 28* 21 6*  
CREA 3.10* 2.66* 0.91  
CA 8.2* 8.3 7.8*  
* 184* 172* MG  --  2.0  --   
PHOS  --  3.5  -- Assessment and Plan:  
1) DEJUAN on CKD-  HD yesterday well tolerated. Plan on HD today/tomorrow and then transition to a thrice weekly schedule. 2) HTN/Volume-  UF as tolerated with HD today. 3) Anemia- Hb stable. Monitoring. 4) Respiratory failure- extubated on HHFNC. UF with HD today. 5) S/p CABG Mickiel Schwalbe, MD  
 
 
1200 Addendum:  Patient seen on dialysis. Tolerating well. Breathing easier. Plan on HD again tomorrow.

## 2020-01-28 NOTE — PROGRESS NOTES
Bedside and verbal report received from [place], [facility].

## 2020-01-28 NOTE — CONSULTS
[address] [facility], [address] [facility] 
[phone] History and Physical  
Michael Horta    Admit date: 1/10/2020 MRN: [id number]     : 1945     Age: 69 y.o.       
 
2020 1:02 PM 
 
Subjective/HPI:  
This patient is a 69 y.o. white male seen at the request of Jenny Webb MD and evaluated for ischemia fingers and toes. Patient underwent 3vCABG and AV replacement 1/10/2020 by Medina Alfonso MD. 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 In last 3-4d, staff noticed increasing bluish discoloration to toes and fingertips. Since extubation 2020, patient reports intermittent, sharp, stabbing pain in toes and fingertips. Patient reports distal bilateral LE tingling x 1mo prior to admission. Patient's past medical, surgical, family and social histories were reviewed as noted here and below. Review of Systems A comprehensive review of systems was negative except for that written in the HPI. Past Medical History:  
Diagnosis Date  Arthritis  BPH (benign prostatic hyperplasia) 2013  CAD (coronary artery disease)  DM type 2 (diabetes mellitus, type 2) ([facility] [address] dx 2004 Type 2, avg fbs , recognizes hypo at 67, last HA1C was 9.2 on 2014  Dyspnea   
 at times, Uses Albuterol inhaler when needed (last used first of aug 2014)  Gout 2013  HLD (hyperlipidemia) 2013  
 HTN (hypertension)   
 controlled with meds  Morbid obesity ([address] 9/3/14 BMI 41.7  Psychiatric disorder   
 anxiety, controlled with buspar  Rheumatic fever   
 as a child  Seasonal allergic rhinitis   
 treated with Allegra  Severe aortic valve stenosis   
 echo 19 EF 60-65%- mild to moderate regurgitation  Thyroid disease   
 hx- no longer takes synthroid  Unspecified sleep apnea 2016  
 bi pap Past Surgical History:  
Procedure Laterality Date  HX CATARACT REMOVAL Bilateral 2012  HX HEART CATHETERIZATION  12/23/2019  HX ORTHOPAEDIC Left   
 carpal tunnel  HX TONSIL AND ADENOIDECTOMY Allergies Allergen Reactions  Heparin Other (comments) HIT antibody test strongly positive on 1/17/2020. SHARON drawn 1/18/2020 and resulted positive on 1/21/2020  Amoxicillin Rash  Keflex [Cephalexin] Rash  Pcn [Penicillins] Other (comments) \"felt closed in\". No rash Social History Tobacco Use  Smoking status: Never Smoker  Smokeless tobacco: Never Used Substance Use Topics  Alcohol use: Yes Alcohol/week: 0.0 standard drinks Comment: rarely Family History Problem Relation Age of Onset  Lung Disease Mother   
     copd  Cancer Father   
     lympnode cancer  No Known Problems Brother  Cancer Other   
     fmh lymphoma  Diabetes Other   
     fmhx  Diabetes Maternal Grandfather Prior to Admission Medications Prescriptions Last Dose Informant Patient Reported? Taking? albuterol (PROVENTIL HFA) 90 mcg/actuation inhaler 1/9/2020 at Unknown time  No Yes Sig: Take 2 Puffs by inhalation every six (6) hours as needed for Wheezing. allopurinol (ZYLOPRIM) 300 mg tablet 1/10/2020 at Unknown time  No Yes Sig: Take 1 Tab by mouth daily. amiodarone (CORDARONE) 200 mg tablet 1/9/2020 at 1630  Yes Yes Sig: Take 600 mg by mouth. Take amiodarone 600 mg after 4 pm the night before surgery. ascorbic acid (VITAMIN C) 500 mg tablet 1/6/2020 at Unknown time  Yes Yes Sig: Take 1,000 mg by mouth daily. aspirin delayed-release 81 mg tablet 1/10/2020 at Unknown time  Yes Yes Sig: Take  by mouth daily. busPIRone (BUSPAR) 10 mg tablet 1/10/2020 at Unknown time  No Yes Sig: Take 1 Tab by mouth three (3) times daily. coenzyme q10-vitamin e (COQ10 ) 100-100 mg-unit cap 2020  Yes No  
Sig: Take 300 mg by mouth daily. glyBURIDE-metFORMIN (GLUCOVANCE) 5-500 mg per tablet 2020 at Unknown time  No Yes Sig: Take 2 Tabs by mouth two (2) times daily (with meals). Patient taking differently: Take 2 Tabs by mouth Daily (before breakfast). ibuprofen 200 mg cap 2020  Yes No  
Sig: Take  by mouth. insulin aspart U-100 (NOVOLOG FLEXPEN U-100 INSULIN) 100 unit/mL (3 mL) inpn 2020 at Unknown time  No Yes Sig: INJECT 45 UNITS UNDER THE SKIN AT LARGEST MEAL Patient taking differently: Sliding scale - approximately 40 units before evening meal  
insulin glargine (LANTUS SOLOSTAR U-100 INSULIN) 100 unit/mL (3 mL) inpn 2020 at Unknown time  No Yes Si units bid sq  Indications: type 2 diabetes mellitus Patient taking differently: 75 units in am  Indications: type 2 diabetes mellitus  
lisinopril-hydroCHLOROthiazide (PRINZIDE, ZESTORETIC) 20-12.5 mg per tablet 2020 at Unknown time  No Yes Sig: Take 1 Tab by mouth daily. lutein 20 mg tab 2020  Yes No  
Sig: Take 1 tablet by mouth daily. metoprolol tartrate (LOPRESSOR) 25 mg tablet 2020 at 1630  Yes Yes Sig: Take 12.5 mg by mouth. Take metoprolol 12.5 mg after 4 pm the night before surgery. multivitamins-minerals-lutein (CENTRUM SILVER) Tab 2020  Yes No  
Sig: Take 1 tablet by mouth daily. simvastatin (ZOCOR) 40 mg tablet 2020 at Unknown time  No Yes Sig: Take 1 Tab by mouth nightly. tamsulosin (FLOMAX) 0.4 mg capsule 1/10/2020 at Unknown time  No Yes Sig: Take 1 Cap by mouth daily. Facility-Administered Medications: None Current Facility-Administered Medications Medication Dose Route Frequency  albuterol-ipratropium (DUO-NEB) 2.5 MG-0.5 MG/3 ML  3 mL Nebulization QID RT  
 amiodarone (CORDARONE) tablet 400 mg  400 mg Oral Q12H  
 [START ON 2020] famotidine (PEPCID) tablet 20 mg  20 mg Oral DAILY  insulin regular (NOVOLIN R, HUMULIN R) injection 0-15 Units  0-15 Units SubCUTAneous AC&HS  ondansetron (ZOFRAN) injection 4 mg  4 mg IntraVENous Q6H PRN  
 busPIRone (BUSPAR) tablet 10 mg  10 mg Oral TID  morphine 10 mg/ml injection 5 mg  5 mg IntraVENous Q4H PRN  
 oxyCODONE-acetaminophen (PERCOCET) 5-325 mg per tablet 1 Tab  1 Tab Per NG tube Q4H PRN  
 amiodarone (CORDARONE) 450 mg in dextrose 5% 250 mL infusion  0.5-1 mg/min IntraVENous TITRATE  traMADol (ULTRAM) tablet 50 mg  50 mg Oral Q6H PRN  
 epoetin maritza-epbx (RETACRIT) 14,000 Units combo injection  14,000 Units SubCUTAneous Q7D  
 argatroban 50 mg in 0.9% sodium chloride 50 mL (1000 mcg/mL) infusion  0.5-10 mcg/kg/min IntraVENous TITRATE  alcohol 62% (NOZIN) nasal  1 Ampule  1 Ampule Topical Q12H  
 NUTRITIONAL SUPPORT ELECTROLYTE PRN ORDERS   Does Not Apply PRN  
 NOREPINephrine (LEVOPHED) 16,000 mcg in dextrose 5% 250 mL infusion  0.05-0.2 mcg/kg/min IntraVENous TITRATE  acetaminophen (TYLENOL) solution 650 mg  650 mg Per NG tube Q4H PRN  
 sodium chloride (NS) flush 5-40 mL  5-40 mL IntraVENous Q8H  
 sodium chloride (NS) flush 5-40 mL  5-40 mL IntraVENous PRN  
 naloxone (NARCAN) injection 0.4 mg  0.4 mg IntraVENous PRN  
 [Held by provider] metoprolol tartrate (LOPRESSOR) tablet 25 mg  25 mg Oral Q12H  
 atorvastatin (LIPITOR) tablet 80 mg  80 mg Oral QHS  dextrose (D50W) injection syrg 12.5 g  25 mL IntraVENous PRN  
 [Held by provider] aspirin chewable tablet 81 mg  81 mg Oral DAILY  magnesium sulfate 1 g/100 ml IVPB (premix or compounded)  1 g IntraVENous PRN  
 midazolam (VERSED) injection 1 mg  1 mg IntraVENous Q1H PRN  
 sodium bicarbonate (8.4%) injection 50 mEq  50 mEq IntraVENous PRN Objective:  
 
Vitals:  
 01/28/20 1139 01/28/20 1200 01/28/20 1229 01/28/20 1230 BP:  113/58  119/62 Pulse:  80  83 Resp:  30 25 Temp:      
SpO2: 97% 99% 97% Weight:      
Height: Physical Exam:  
General well-developed, morbidly obese, mildly anxious Skin  (see below for extremities) warm, moist with texture appropriate for age, no jaundice or rashes; healing fresh median sternotomy, linear scratches across forehead, scattered ecchymosis on B UE  
HEENT normocephalic, no lesions to the scalp; extraocular muscles intact, nares patent, mouth with adequate dentition, oral mucosa moist 
Neck  thick, supple without JVD or bruits; temp HD catheter in right neck, trachea midline Lungs  respirations unlabored on Airvo, lungs clear to auscultation bilaterally Heart  regular rate and rhythm, no appreciable murmurs Abdomen soft, non-tender, protuberant but non-distended, bowel sounds normoactive Extremities proximal UE and LE are warm, hands and feet are cool to touch; fingers are cyanotic and mottled, with index fingers bilaterally most involved; 4+ pedal edema with erythematous dorsal feet, toes are cyanotic and less mottled than fingers, most involvement at great toes and diminishing with lesser, sparing smallest toes bilaterally; left great toe with serous drainage from blister; feet sensorimotor intact Pulses  2+ palpable and symmetric radial and brachial pulses; pedal pulses are obscured by edema but DP and PT signals easily obtained with Doppler Neurological alert and oriented Data Review Recent Labs  
  01/28/20 
0321 01/27/20 
0242 01/26/20 
1507 WBC 16.4* 16.2* 16.6* HGB 8.6* 9.1* 9.0*  
HCT 28.1* 29.8* 29.0*  
* 97* 48* Recent Labs  
  01/28/20 
0321 01/27/20 
0242 01/26/20 
6800  141 142  
K 4.4 4.2 3.9  103 106 CO2 28 30 32 * 184* 172* BUN 28* 21 6*  
CREA 3.10* 2.66* 0.91  
MG  --  2.0  --   
PHOS  --  3.5  --   
 
 
Imaging Bilateral upper extremity Doppler evaluation, 01/19/2020 Impression: 1. Limited examination due to patient body habitus.  There is occlusive and 
 nonocclusive thrombus within the right upper extremity as described. 2. Incomplete assessment of right internal jugular, innominate and left 
subclavian veins as described. Bilateral lower extremity duplex venous study, 1/19/2020 IMPRESSION: 
1. Bilateral lower extremity venous thromboses with left popliteal venous 
thrombus demonstrating an appearance suggesting more acute thrombus Assessment / Plan / Recommendations / Medical Decision Making:  
 
Hospital Problems  Date Reviewed: 12/6/2019 Codes Class Noted POA  
 HIT (heparin-induced thrombocytopenia) ([facility]) ICD-10-CM: S35.49 ICD-9-CM: 289.84  1/23/2020 Unknown Bilateral pneumothorax ICD-10-CM: J93.9 ICD-9-CM: 512.89  1/17/2020 Unknown Thrombocytopenia ([facility] [address] ICD-10-CM: D69.6 ICD-9-CM: 287.5  1/17/2020 Unknown Shock ([facility] ICD-10-CM: R57.9 ICD-9-CM: 785.50  1/15/2020 Unknown Atrial fibrillation [facility]) ICD-10-CM: I48.91 
ICD-9-CM: 427.31  1/13/2020 Unknown Acute renal failure (ARF) ([facility]) ICD-10-CM: N17.9 ICD-9-CM: 584.9  1/11/2020 Unknown Aortic valve stenosis ICD-10-CM: I35.0 ICD-9-CM: 424.1  1/10/2020 Unknown * (Principal) CAD (coronary artery disease) ICD-10-CM: I25.10 ICD-9-CM: 414.00  1/10/2020 Unknown Weaning from respirator [facility]) ICD-10-CM: Z99.11 ICD-9-CM: V46.13  1/10/2020 Unknown Acute hypoxemic respiratory failure ([facility] [address] ICD-10-CM: J96.01 
ICD-9-CM: 518.81  1/10/2020 S/P CABG x 3 ICD-10-CM: Z95.1 ICD-9-CM: V45.81  1/10/2020 Unknown S/P AVR ICD-10-CM: Z95.2 ICD-9-CM: V43.3  1/10/2020 Unknown Patient Active Problem List  
 Diagnosis Date Noted  HIT (heparin-induced thrombocytopenia) ([facility] [address] 01/23/2020  Bilateral pneumothorax 01/17/2020  Thrombocytopenia ([facility] [address] 01/17/2020  Shock ([facility] [address] 01/15/2020  Atrial fibrillation ([facility] [address] 01/13/2020  Acute renal failure (ARF) ([facility] [address] 01/11/2020  Aortic valve stenosis 01/10/2020  CAD (coronary artery disease) 01/10/2020  Weaning from respirator [facility]) 01/10/2020  [facility] hypox[facility] 01/10/2020  S/P CABG x 3 01/10/2020  S/P AVR 01/10/2020  Bilateral carotid artery stenosis 11/24/2019  Type 2 diabetes with nephropathy ([facility] 08/26/2019  Obesity, morbid ([facility] 10/19/2018  Nonrheumatic aortic valve stenosis 04/13/2018  DM type 2 (diabetes mellitus, type 2) ([facility] 01/14/2013  Gout 01/14/2013  
 HTN (hypertension) 01/14/2013  BPH (benign prostatic hyperplasia) 01/14/2013  Seasonal allergic rhinitis 01/14/2013  HLD (hyperlipidemia) 01/14/2013 Michael Horta is a 69 y.o. male with protracted hospital course s/p CABG and critical post-operative issues, now with cyanotic distal extremities. Ischemia likely related to recent sepsis / shock, extended use of high-dose vasopressors, etc. Patient has palpable radial pulses, Dopplerable pedal pulses. - encourage Rooke boots, hand mitts for warmth 
- consider topical nitroglycerin to extremities for local vasodilatation Patient will be evaluated by vascular surgeon on-call, Donald Thapa MD, who will make further recommendations. Thank you very much for this referral. We appreciate the opportunity to participate in this patient's care. We will follow along with above stated plan. Mikala Boateng PA-C Physician Assistant with [facility] Vascular Surgery [facility].  Dora Banueols MD / Lorena Noe MD

## 2020-01-28 NOTE — PROGRESS NOTES
Nutrition F/U Per RD meal rounds: <10% of lunch. Pt says he ate some eggs, muffin and milk this morning. He says he did not eat lunch because his blood pressure and blood sugar dropped (-177mg/dl today so unsure is pt may be somewhat confused). He does like the Glucerna shake and says he did drink all of one at some point in time. He says it its too early to talk about dinner, but was receptive to drinking a Glucerna shake now. RD provided with shake and encouraged him to drink after all meals while his meal intake is so low. Lindsay Amador, [place], [address], [address] [facility]

## 2020-01-28 NOTE — PROGRESS NOTES
POD 18 Days Post-Op Procedure:  Procedure(s): CORONARY ARTERY BYPASS GRAFT WITH AVR/ (CABG X 3 )/ LIMA VEIN HARVEST/ GREATER SAPHENOUS 
ESOPHAGEAL TRANS ECHOCARDIOGRAM 
 
 
Subjective:  
 
Patient has No significant medical complaints Objective:  
 
Patient Vitals for the past 8 hrs: 
 BP Pulse Resp SpO2 Weight  
20 0659 129/61 76 (!) 36 100 %   
20 0630 129/60 79 (!) 39 100 % 307 lb 5.1 oz (139.4 kg) 20 0559 127/60 77 (!) 31 98 %   
20 0530 129/59 79 21 98 %   
20 0500 124/58 79 24 98 %   
20 0430 149/65 75 25 96 %   
20 0359 133/63 77 16 98 %   
20 0330 120/56 74 17 98 %   
20 0259 121/55 74 (!) 32 99 %   
20 0230 110/52 76 21 100 %   
20 0159 104/57 78 19 100 %   
20 0130 91/50 83 20 100 %   
20 0100 116/56 92 26 94 %   
20 0031 132/59 80 22 100 %   
20 0029    100 %   
20 2359 122/58 80 25 98 %  Temp (24hrs), Av.3 °F (36.8 °C), Min:97.7 °F (36.5 °C), Max:98.8 °F (37.1 °C) Hemodynamics PAP Systolic: 50 PAP 
CO (l/min): 7.5 l/min CO 
CI (l/min/m2): 2.9 l/min/m2 CI No intake/output data recorded.  1901 -  0700 In: 2216.7 [P.O.:1110; I.V.:1106.7] Out: 9516 [Urine:125] CT Drainage 
  
  
   
  total of all CT's Heart:  regular rate and rhythm, S1, S2 normal, no murmur, click, rub or gallop Lung:  clear to auscultation bilaterally Neuro: Grossly non focal 
Incisions: Clean, dry, and intact Labs: 
Recent Results (from the past 24 hour(s)) GLUCOSE, POC Collection Time: 20 12:32 PM  
Result Value Ref Range Glucose (POC) 196 (H) 65 - 100 mg/dL GLUCOSE, POC Collection Time: 20  6:40 PM  
Result Value Ref Range Glucose (POC) 120 (H) 65 - 100 mg/dL GLUCOSE, POC Collection Time: 20  9:20 PM  
Result Value Ref Range Glucose (POC) 115 (H) 65 - 100 mg/dL METABOLIC PANEL, BASIC  Collection Time: 20  3:21 AM  
 Result Value Ref Range Sodium 139 136 - 145 mmol/L Potassium 4.4 3.5 - 5.1 mmol/L Chloride 102 98 - 107 mmol/L  
 CO2 28 21 - 32 mmol/L Anion gap 9 7 - 16 mmol/L Glucose 162 (H) 65 - 100 mg/dL BUN 28 (H) 8 - 23 MG/DL Creatinine 3.10 (H) 0.8 - 1.5 MG/DL  
 GFR est AA 26 (L) >60 ml/min/1.73m2 GFR est non-AA 21 (L) >60 ml/min/1.73m2 Calcium 8.2 (L) 8.3 - 10.4 MG/DL  
PTT Collection Time: 01/28/20  3:21 AM  
Result Value Ref Range aPTT 61.3 (H) 24.7 - 39.8 SEC  
CBC W/O DIFF Collection Time: 01/28/20  3:21 AM  
Result Value Ref Range WBC 16.4 (H) 4.3 - 11.1 K/uL  
 RBC 2.87 (L) 4.23 - 5.6 M/uL HGB 8.6 (L) 13.6 - 17.2 g/dL HCT 28.1 (L) 41.1 - 50.3 % MCV 97.9 (H) 79.6 - 97.8 FL  
 MCH 30.0 26.1 - 32.9 PG  
 MCHC 30.6 (L) 31.4 - 35.0 g/dL  
 RDW 15.2 (H) 11.9 - 14.6 % PLATELET 335 (L) 986 - 450 K/uL MPV 11.2 9.4 - 12.3 FL ABSOLUTE NRBC 0.00 0.0 - 0.2 K/uL GLUCOSE, POC Collection Time: 01/28/20  6:23 AM  
Result Value Ref Range Glucose (POC) 177 (H) 65 - 100 mg/dL Assessment:  
 
Principal Problem: 
  CAD (coronary artery disease) (1/10/2020) Active Problems: Aortic valve stenosis (1/10/2020) Weaning from respirator [facility]) (1/10/2020) [facility] hypox[facility] (1/10/2020) S/P CABG x 3 (1/10/2020) S/P AVR (1/10/2020) Acute renal failure (ARF) ([address] (1/11/2020) Atrial fibrillation ([address] (1/13/2020) Shock ([address] (1/15/2020) Bilateral pneumothorax (1/17/2020) Thrombocytopenia ([address] (1/17/2020) HIT (heparin-induced thrombocytopenia) ([facility] (1/23/2020) Plan/Recommendations/Medical Decision Making:  
 
plts 100K today, continue PT, supportive care See orders

## 2020-01-29 NOTE — PROGRESS NOTES
[facility] Check Admission Date: 1/10/2020 Daily Progress Note: 1/29/2020 The patient's chart is reviewed and the patient is discussed with the staff. Patient had CABG x 3 and AVR on 1/10.  Slow to improve. Had small ptx that self resolved. 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 Subjective:  
 
Still on optiflow 50 L /50 %. Awake and alert. Did not like BOBBY boots. Still has cool right toes and some fingers. No wheezing. No dyspnea. No major events overnight, no pain Review of Systems: 
-Fever 
-Headaches 
-Chest pain 
-Dyspnea, -wheezing,- cough 
-Abdominal pain, -constipation +Leg swelling +cool fingers/toes All other organ systems grossly normal. 
 
 
 
Current Facility-Administered Medications Medication Dose Route Frequency  albuterol-ipratropium (DUO-NEB) 2.5 MG-0.5 MG/3 ML  3 mL Nebulization QID RT  
 amiodarone (CORDARONE) tablet 400 mg  400 mg Oral Q12H  
 famotidine (PEPCID) tablet 20 mg  20 mg Oral DAILY  nitroglycerin (NITROBID) 2 % ointment 1 Inch  1 Inch Topical TID  insulin regular (NOVOLIN R, HUMULIN R) injection 0-15 Units  0-15 Units SubCUTAneous AC&HS  ondansetron (ZOFRAN) injection 4 mg  4 mg IntraVENous Q6H PRN  
 busPIRone (BUSPAR) tablet 10 mg  10 mg Oral TID  morphine 10 mg/ml injection 5 mg  5 mg IntraVENous Q4H PRN  
 oxyCODONE-acetaminophen (PERCOCET) 5-325 mg per tablet 1 Tab  1 Tab Per NG tube Q4H PRN  
 amiodarone (CORDARONE) 450 mg in dextrose 5% 250 mL infusion  0.5-1 mg/min IntraVENous TITRATE  traMADol (ULTRAM) tablet 50 mg  50 mg Oral Q6H PRN  
 epoetin maritza-epbx (RETACRIT) 14,000 Units combo injection  14,000 Units SubCUTAneous Q7D  
 argatroban 50 mg in 0.9% sodium chloride 50 mL (1000 mcg/mL) infusion  0.5-10 mcg/kg/min IntraVENous TITRATE  alcohol 62% (NOZIN) nasal  1 Ampule  1 Ampule Topical Q12H  
 NUTRITIONAL SUPPORT ELECTROLYTE PRN ORDERS   Does Not Apply PRN  
 NOREPINephrine (LEVOPHED) 16,000 mcg in dextrose 5% 250 mL infusion  0.05-0.2 mcg/kg/min IntraVENous TITRATE  acetaminophen (TYLENOL) solution 650 mg  650 mg Per NG tube Q4H PRN  
 sodium chloride (NS) flush 5-40 mL  5-40 mL IntraVENous Q8H  
 sodium chloride (NS) flush 5-40 mL  5-40 mL IntraVENous PRN  
 naloxone (NARCAN) injection 0.4 mg  0.4 mg IntraVENous PRN  
 [Held by provider] metoprolol tartrate (LOPRESSOR) tablet 25 mg  25 mg Oral Q12H  
 atorvastatin (LIPITOR) tablet 80 mg  80 mg Oral QHS  dextrose (D50W) injection syrg 12.5 g  25 mL IntraVENous PRN  
 [Held by provider] aspirin chewable tablet 81 mg  81 mg Oral DAILY  magnesium sulfate 1 g/100 ml IVPB (premix or compounded)  1 g IntraVENous PRN  
 midazolam (VERSED) injection 1 mg  1 mg IntraVENous Q1H PRN  
 sodium bicarbonate (8.4%) injection 50 mEq  50 mEq IntraVENous PRN Objective:  
 
Vitals:  
 01/29/20 0100 01/29/20 0200 01/29/20 0300 01/29/20 7901 BP: 93/50 96/54 96/49 Pulse: 77 74 71 Resp: 19 16 16 Temp:   98.4 °F (36.9 °C) SpO2: 97% 94% 97% Weight:    283 lb 1.1 oz (128.4 kg) Height:      
 
Intake and Output:  
01/27 1901 - 01/29 0700 In: 1987.8 [P.O.:1410; I.V.:577.8] Out: 9174 [Urine:53; Drains:75] No intake/output data recorded. Physical Exam:  
Constitution:  the patient is well developed and in NO respiratory distress on optiflow EENMT:  Sclera clear, pupils equal, oral mucosa moist 
Respiratory: decreased sounds in left base Cardiovascular:  RRR without M,G,R 
Gastrointestinal: soft and non-tender; with positive bowel sounds. Musculoskeletal: moving all limbs. There is gross edema b/l. Noted cyanosis of fingers and toes. Left foot is warmer today, right toes are still cold. Finger time -- some on both hands are cold and cyanotic. Skin:  no jaundice or rashes, surgical wounds Neurologic: no gross neuro deficits Psychiatric:  Awake and alert. CHEST XRAY:  Today with mild congestion and left pleural effusion and CM 
 
 
 
1/23/20 IMPRESSION: 
Stable abnormal exam with tubes and lines as described above Atherosclerosis LAB No lab exists for component: [place] Recent Labs  
  01/29/20 
0307 01/28/20 
0321 01/27/20 
0242 WBC 15.8* 16.4* 16.2* HGB 8.8* 8.6* 9.1*  
HCT 28.3* 28.1* 29.8*  
* 100* 97* Recent Labs  
  01/29/20 
0307 01/28/20 
0321 01/27/20 
0242  139 141  
K 4.4 4.4 4.2  102 103 CO2 31 28 30 * 162* 184* BUN 33* 28* 21  
CREA 3.85* 3.10* 2.66* MG 2.2  --  2.0  
CA 8.2* 8.2* 8.3 PHOS 3.8*  --  3.5 ABG:   
No results found for: PH, PHI, PCO2, PCO2I, PO2, PO2I, HCO3, HCO3I, FIO2, FIO2I Assessment:  (Medical Decision Making) Hospital Problems  Date Reviewed: 12/6/2019 Codes Class Noted POA  
 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 
ICD-9-CM: 427.31  1/13/2020 Unknown Acute renal failure (ARF) (HCC) ICD-10-CM: N17.9 ICD-9-CM: 584.9  1/11/2020 Unknown Aortic valve stenosis ICD-10-CM: I35.0 ICD-9-CM: 424.1  1/10/2020 Unknown * (Principal) CAD (coronary artery disease) ICD-10-CM: I25.10 ICD-9-CM: 414.00  1/10/2020 Unknown Weaning from respirator [facility]) extubated ICD-10-CM: Z99.11 ICD-9-CM: V46.13  1/10/2020 Unknown Acute hypoxemic respiratory failure ([facility] [address] ICD-10-CM: J96.01 
ICD-9-CM: 518.81  1/10/2020 S/P CABG x 3 ICD-10-CM: Z95.1 ICD-9-CM: V45.81  1/10/2020 Unknown S/P AVR ICD-10-CM: Z95.2 ICD-9-CM: V43.3  1/10/2020 Unknown Patient with prolonged intubation s/p cabg and avr. Current indications are that he would likely fail extubation with his severe tachypnea and low tidal volumes. HIT + both on ROSA and SHARON. LE doppler with B lower extremity clot. Renal failure so unable to perform CT PE. Plan:  (Medical Decision Making) -- continue optilfow ,wean as tolerated. -- IS, pulmomary toilet -- HD per renal planned for today 
-- continue argatroban and adjust per hematology -- mobilize ,PT 
-- Vascular Sx following 
--continue remaining treatment.  
 
  
 
 
 
Devona Blizzard, MD

## 2020-01-29 NOTE — PROGRESS NOTES
[facility] PROGRESS NOTE Follow up for: 
 
Subjective:  
Patient seen and examined at bedside. Day 3 of daily HD. Tolerating well. ROS: 
Gen - no fever, no chills CV - no chest pain, no orthopnea Lung - + shortness of breath, no cough Abd - no tenderness, no nausea, no vomiting Ext - + edema Objective:  
Exam: 
Vitals:  
 01/29/20 0823 01/29/20 0830 01/29/20 0835 01/29/20 0900 BP:   128/58 124/57 Pulse:  78 75 73 Resp:  24  19 Temp:      
SpO2: 94% Weight:      
Height:      
 
 
 
Intake/Output Summary (Last 24 hours) at 1/29/2020 3294 Last data filed at 1/29/2020 9445 Gross per 24 hour Intake 1351.95 ml Output 3928 ml Net -2576.05 ml Current Facility-Administered Medications Medication Dose Route Frequency  albuterol-ipratropium (DUO-NEB) 2.5 MG-0.5 MG/3 ML  3 mL Nebulization QID RT  
 amiodarone (CORDARONE) tablet 400 mg  400 mg Oral Q12H  
 famotidine (PEPCID) tablet 20 mg  20 mg Oral DAILY  nitroglycerin (NITROBID) 2 % ointment 1 Inch  1 Inch Topical TID  insulin regular (NOVOLIN R, HUMULIN R) injection 0-15 Units  0-15 Units SubCUTAneous AC&HS  ondansetron (ZOFRAN) injection 4 mg  4 mg IntraVENous Q6H PRN  
 busPIRone (BUSPAR) tablet 10 mg  10 mg Oral TID  morphine 10 mg/ml injection 5 mg  5 mg IntraVENous Q4H PRN  
 oxyCODONE-acetaminophen (PERCOCET) 5-325 mg per tablet 1 Tab  1 Tab Per NG tube Q4H PRN  
 amiodarone (CORDARONE) 450 mg in dextrose 5% 250 mL infusion  0.5-1 mg/min IntraVENous TITRATE  traMADol (ULTRAM) tablet 50 mg  50 mg Oral Q6H PRN  
 epoetin maritza-epbx (RETACRIT) 14,000 Units combo injection  14,000 Units SubCUTAneous Q7D  
 argatroban 50 mg in 0.9% sodium chloride 50 mL (1000 mcg/mL) infusion  0.5-10 mcg/kg/min IntraVENous TITRATE  alcohol 62% (NOZIN) nasal  1 Ampule  1 Ampule Topical Q12H  
 NUTRITIONAL SUPPORT ELECTROLYTE PRN ORDERS   Does Not Apply PRN  
  NOREPINephrine (LEVOPHED) 16,000 mcg in dextrose 5% 250 mL infusion  0.05-0.2 mcg/kg/min IntraVENous TITRATE  acetaminophen (TYLENOL) solution 650 mg  650 mg Per NG tube Q4H PRN  
 sodium chloride (NS) flush 5-40 mL  5-40 mL IntraVENous Q8H  
 sodium chloride (NS) flush 5-40 mL  5-40 mL IntraVENous PRN  
 naloxone (NARCAN) injection 0.4 mg  0.4 mg IntraVENous PRN  
 [Held by provider] metoprolol tartrate (LOPRESSOR) tablet 25 mg  25 mg Oral Q12H  
 atorvastatin (LIPITOR) tablet 80 mg  80 mg Oral QHS  dextrose (D50W) injection syrg 12.5 g  25 mL IntraVENous PRN  
 [Held by provider] aspirin chewable tablet 81 mg  81 mg Oral DAILY  magnesium sulfate 1 g/100 ml IVPB (premix or compounded)  1 g IntraVENous PRN  
 midazolam (VERSED) injection 1 mg  1 mg IntraVENous Q1H PRN  
 sodium bicarbonate (8.4%) injection 50 mEq  50 mEq IntraVENous PRN  
 
 
EXAM 
GEN - Alert and awake CV - S1, S2, RRR, no rub, murmur, or gallop Lung - clear anteriorly, diminished at bases Abd - soft, nontender, BS present Ext - 2+ edema Recent Labs  
  01/29/20 
0307 01/28/20 
0321 01/27/20 
0242 WBC 15.8* 16.4* 16.2* HGB 8.8* 8.6* 9.1*  
HCT 28.3* 28.1* 29.8*  
* 100* 97* Recent Labs  
  01/29/20 
0307 01/28/20 
0321 01/27/20 
0242  139 141  
K 4.4 4.4 4.2  102 103 CO2 31 28 30 BUN 33* 28* 21  
CREA 3.85* 3.10* 2.66* CA 8.2* 8.2* 8.3 * 162* 184* MG 2.2  --  2.0 PHOS 3.8*  --  3.5 Assessment and Plan:  
1) DEJUAN on CKD-  On HD today  Plan to transition to a thrice weekly schedule. Next HD Friday 2) HTN/Volume-  UF as tolerated with HD today. 3) Anemia- Hb stable. Monitoring. 4) Respiratory failure- extubated on HHFNC. UF with HD today. 5) S/p CABG Blanka Ndiaye MD

## 2020-01-29 NOTE — PROGRESS NOTES
Patient seen and examined. Initially called for discoloration of distal fingers and distal toes. He is status post complex medical history including coronary artery bypass. Became septic requiring multiple vasopressors. Clinically he has dopplerable radial and ulnar signals Doppler pedal pulses with discoloration of the distal  index finger and discoloration of his distal toes of both feet. Clinically he is not ischemic. This is most likely from long-term vasoconstriction from being on pressor requirement. Discussed with patient that the discoloration may be long-term there is no indication for any revascularization to the distal vessels. Discussed with patient and nurse today that potentially this may lead to necrosis of his distal tips of his toes. However there is no indication of any surgical intervention. If finger were to become more painful and possibly infected would most likely recommend hand surgeon for recommendations. Only indication for any surgical intervention on the toes would be wet gangrene. I attempt to call patient's son on the phone this morning. Erenest Nissen

## 2020-01-29 NOTE — PROGRESS NOTES
Nutrition Follow up:  
 Assessment:  
Diet orders: 1-26: Clear liquid, 1-27: CCHO, cardiac with Glucerna shakes tid Food/Nutrition History: Pt seen in company of son and granddaughters. He says he still does not have much appetite and is having a hard time feeding himself because of coordination issues. He reports he drank 1 bottle of glcuerna today but he doesn't want to drink too much and make himself sick. However RN reports pt is doing better with feeding himself and is eating better. Anthropometrics:Height: 5' 10\" (177.8 cm), Weight Source: Bed, Weight: 128.4 kg (283 lb 1.1 oz) Last 3 Recorded Weights in this Encounter 01/27/20 3763 01/28/20 0630 01/29/20 5098 Weight: 140.9 kg (310 lb 10.1 oz) 139.4 kg (307 lb 5.1 oz) 128.4 kg (283 lb 1.1 oz) Edema: Anasarca generalized, 2+BUE and BLE Pre-op standing scale weight: 136.6 kg UBW range per EMR: 300-306# Pt has had HD treatments x 3 days with successful fluid removal but question accuracy of today's weight. Macronutrient needs:(using UBW (Usual body weight) 136.4 kg and IBW 75.5 kg) EER:  9210-0010 kcal /day (11-14 kcal/kg UBW) Revised EPR:  91-98 grams protein/day (1.2-1.3 grams/kg IBW)-for HD Intake/Comparative Standards:Average intake for past 2 day(s)/5 recorded meal(s): 28%. This potentially meets ~25-50% of kcal and ~25% of protein needs Intervention: 
Meals and snacks: Continue current diet. Nutrition Supplement Therapy: Continue Glucerna shake tid. Coordination of nutrition care: Discussed with OLYA Stone, [place], [address], [address] [place] [address]

## 2020-01-29 NOTE — PROGRESS NOTES
Problem: Mobility Impaired (Adult and Pediatric) Goal: *Acute Goals and Plan of Care (Insert Text) Description STG: 
(1.)Patient will roll side to side in bed with  MAXIMAL ASSIST within 3-5 day(s). (2.)Patient will move from supine to sit and sit to supine  in bed with  MAXIMAL ASSIST within 3-5 day(s). (3.)Patient will sit edge of bed/chair with STAND BY ASSIST and good balance statically  for 10 minutes within 3-5 day(s). 4.  Patient will perform 1 sets of 10 repetitions of active range of motion exercises for bilateral lower extremity(s) with  cueing within 3-5 day(s). 5.  Patient will tolerate sitting with bed in chair position for 2 hour 2x/day to facilitate tolerance to sitting in chair within 3-5 days. LTG: 
(1.)Patient will move from supine to sit and sit to supine  and roll side to side in bed with  MODERATE ASSIST  within 7-10 day(s). (2.)Patient will transfer from bed to chair and chair to bed with  MAXIMAL ASSIST using the least restrictive device within 7-10 day(s). (3.)Patient will stand with  MINIMAL ASSIST for 2 minutes with the least restrictive device within 7-10 day(s). 4.  Patient will exhibit 3/5 strength B LE's to facilitate increased independence with bed mobility within 7-10 days. Outcome: Progressing Towards Goal 
  
PHYSICAL THERAPY: Daily Note and PM 1/29/2020 INPATIENT: PT Visit Days : 4 Payor: Napoleon Nik / Plan: Reece Mccain / Product Type: [employer] Care Medicare /   
  
NAME/AGE/GENDER: Rica Rivera is a 69 y.o. male PRIMARY DIAGNOSIS: Atherosclerosis of native coronary artery of native heart with unstable angina pectoris ([facility] [facility] [I25.110] Nonrheumatic aortic valve stenosis [I35.0] CAD (coronary artery disease) [I25.10] Aortic valve stenosis [I35.0] CAD (coronary artery disease) CAD (coronary artery disease) Procedure(s) (LRB): 
CORONARY ARTERY BYPASS GRAFT WITH AVR/ (CABG X 3 )/ LIMA (N/A) VEIN HARVEST/ GREATER SAPHENOUS (Left) ESOPHAGEAL TRANS ECHOCARDIOGRAM (N/A) 19 Days Post-Op ICD-10: Treatment Diagnosis:  
 · Other abnormalities of gait and mobility (R26.89) Precaution/Allergies: 
Heparin; Amoxicillin; Keflex [cephalexin]; and Pcn [penicillins] ASSESSMENT:  
 
Mr. Jes Flores underwent above surgery on 1/10/20 and was only recently extubated. He lives with his wife and ambulates independently with cane or RW at baseline. 1/29/20 pm: Pt seen in CVICU,  Supine in bed. Patient agreeable. RN at bedside. Bed placed in the  chair position. Patient on arivo. Pt with fair static sitting balance, prop sitting  Participates in therapeutic exercises with min assist and cues. Attempted to stand patient with max A x 2  with the use of the stand up lift   Patient had a syncope episode and was returned to sitting then to supine in the bed. RN at bedside caring for the patient. Assisted to RN in making the patient comfortable. A little progress demonstrated. PT to cont to follow for acute care needs. This section established at most recent assessment PROBLEM LIST (Impairments causing functional limitations): 1. Decreased Strength 2. Decreased ADL/Functional Activities 3. Decreased Transfer Abilities 4. Decreased Ambulation Ability/Technique 5. Decreased Balance 6. Decreased Activity Tolerance 7. Increased Fatigue 8. Increased Shortness of Breath 9. Decreased Knowledge of Precautions INTERVENTIONS PLANNED: (Benefits and precautions of physical therapy have been discussed with the patient.) 1. Balance Exercise 2. Bed Mobility 3. Neuromuscular Re-education/Strengthening 4. Therapeutic Activites 5. Therapeutic Exercise/Strengthening 6. Transfer Training 7. education TREATMENT PLAN: Frequency/Duration: twice daily for duration of hospital stay Rehabilitation Potential For Stated Goals: Good REHAB RECOMMENDATIONS (at time of discharge pending progress):   
 Placement: It is my opinion, based on this patient's performance to date, that Mr. Shawna Tijerina may benefit from intensive therapy at a [address] [place] [address] after discharge due to the functional deficits listed above that are likely to improve with skilled rehabilitation and severe debility. Equipment:  
? tbd  
? None at this time HISTORY:  
History of Present Injury/Illness (Reason for Referral): 
Patient admitted for above surgery. Past Medical History/Comorbidities:  
Mr. Shawna Tijerina  has a past medical history of Arthritis, BPH (benign prostatic hyperplasia) (1/14/2013), CAD (coronary artery disease), DM type 2 (diabetes mellitus, type 2) ([facility] [address] (dx 2004), Dyspnea, Gout (1/14/2013), HLD (hyperlipidemia) (1/14/2013), HTN (hypertension), Morbid obesity ([facility] [address] (9/3/14), Psychiatric disorder, Rheumatic fever, Seasonal allergic rhinitis, Severe aortic valve stenosis, Thyroid disease, and Unspecified sleep apnea (2016). Mr. Shawna Tijerina  has a past surgical history that includes hx tonsil and adenoidectomy; hx heart catheterization (12/23/2019); hx orthopaedic (Left); and hx cataract removal (Bilateral, 2012). Social History/Living Environment:  
Home Environment: Private residence One/Two Story Residence: One story Living Alone: No 
Support Systems: Spouse/Significant Other/Partner Patient Expects to be Discharged to[de-identified] Private residence Current DME Used/Available at Home: Cane, straight Prior Level of Function/Work/Activity: 
 
He lives with his wife and ambulates independently with cane or RW at baseline. Number of Personal Factors/Comorbidities that affect the Plan of Care: 3+: HIGH COMPLEXITY EXAMINATION:  
Most Recent Physical Functioning:  
Gross Assessment: 
  
         
  
Posture: 
  
Balance: 
Sitting: Impaired Sitting - Static: Fair (occasional); Supported sitting Sitting - Dynamic: Poor (constant support) Standing: Impaired Standing - Static: Constant support;Poor Bed Mobility: Wheelchair Mobility: 
  
Transfers: 
Sit to Stand: (with standing lift) Stand to Sit: Total assistance;Assist x2 Gait: 
  
   
  
Body Structures Involved: 1. Heart 2. Lungs 3. Muscles Body Functions Affected: 1. Cardio 2. Respiratory 3. Neuromusculoskeletal 
4. Movement Related Activities and Participation Affected: 1. Mobility 2. Self Care 3. Domestic Life 4. Community, Social and [place] Number of elements that affect the Plan of Care: 4+: HIGH COMPLEXITY CLINICAL PRESENTATION:  
Presentation: Evolving clinical presentation with changing clinical characteristics: MODERATE COMPLEXITY CLINICAL DECISION [address] [place] [address] AM-PAC 6 Clicks Basic Mobility Inpatient Short Form How much difficulty does the patient currently have. .. Unable A Lot A Little None 1. Turning over in bed (including adjusting bedclothes, sheets and blankets)? [x] 1   [] 2   [] 3   [] 4  
2. Sitting down on and standing up from a chair with arms ( e.g., wheelchair, bedside commode, etc.)   [x] 1   [] 2   [] 3   [] 4  
3. Moving from lying on back to sitting on the side of the bed? [x] 1   [] 2   [] 3   [] 4 How much help from another person does the patient currently need. .. Total A Lot A Little None 4. Moving to and from a bed to a chair (including a wheelchair)? [x] 1   [] 2   [] 3   [] 4  
5. Need to walk in hospital room? [x] 1   [] 2   [] 3   [] 4  
6. Climbing 3-5 steps with a railing? [x] 1   [] 2   [] 3   [] 4  
© , Trustees of [address] [place] [id number], under license to [employer]. All rights reserved Score:  Initial: 6 Most Recent: X (Date: -- ) Interpretation of Tool:  Represents activities that are increasingly more difficult (i.e. Bed mobility, Transfers, Gait). Medical Necessity:    
· Patient is expected to demonstrate progress in  
· strength, range of motion, balance, coordination, functional technique, and activity tolerance ·  to  
 · decrease assistance required with all functional mobility · . Reason for Services/Other Comments: 
· Patient continues to require skilled intervention due to · medical complications and patient unable to attend/participate in therapy as expected · . Use of outcome tool(s) and clinical judgement create a POC that gives a: Questionable prediction of patient's progress: MODERATE COMPLEXITY  
  
 
 
 
TREATMENT:  
(In addition to Assessment/Re-Assessment sessions the following treatments were rendered) Pre-treatment Symptoms/Complaints:  \"What are we going to do\" Pain: Initial:  
   Post Session:  0/10 post session Therapeutic Activity: (    15 minutes): Therapeutic activities including Bed transfers, sit to stand transfers, static standing balance, posture ground to improve mobility, strength and balance. Required moderate cues for breathing while standing   to promote dynamic balance in standing. Therapeutic Exercise: ( 10 minutes):  Exercises per grid below to improve mobility, strength, balance and coordination. Required minimum to moderate verbal cues to participate and perform correctly. Progressed resistance and repetitions as indicated. DATE: 1/26/20 1/29/20 Straight leg raise Hip abduct/ adduct X5 AAB Heel slides  X3 AB Hip external/ internal rotation Ankle dorsiflexion/ plantarflexion X5 AB 25 Sitting unsupported x5'       
LAQ  25      
marching  25 Key:  A=active, AA=active assisted, P=passive, B=bilaterally, R=right, L=left Braces/Orthotics/Lines/Etc:  
· IV 
· brown catheter · Wound vac · O2 Device: Heated, Hi flow nasal cannula Treatment/Session Assessment:   
· Response to Treatment:  Fatigued after treatment, decreased responses  Had a syncope episode with standing · Interdisciplinary Collaboration:  
o Physical Therapy Assistant 
o Registered Nurse 
o Rehabilitation Attendant · After treatment position/precautions:  
o Supine in bed 
o Bed/Chair-wheels locked 
o Call light within reach 
o Nurse at bedside · Compliance with Program/Exercises: Will assess as treatment progresses · Recommendations/Intent for next treatment session: \"Next visit will focus on advancements to more challenging activities and reduction in assistance provided\". Total Treatment Duration: PT Patient Time In/Time Out Time In: 8374 Time Out: 1430 Elia Oleary, PTA

## 2020-01-29 NOTE — PROGRESS NOTES
PT note:  Treatment deferred  this am as the on HD at this time.   Will check back this pm.  Kenneth Montiel, PTA

## 2020-01-29 NOTE — PROGRESS NOTES
Pt had syncopal episode lasting less then 10 seconds, while attempting to stand up with physical therapy, and writer,  using mechanical standing lift. Pt assisted back to bed to bed with no injury. Confused to time and place reoriented with no difficulty. Pt stated   \"I tried, I am just exhausted. \"    Pt resting in bed VSS continue to monitor.

## 2020-01-29 NOTE — PROGRESS NOTES
A follow up visit was made to the patient. Emotional support, spiritual presence and  
prayer were provided for the patient. EDMOND Smart

## 2020-01-29 NOTE — DIALYSIS
Hemodialysis treatment completed without complications. Patient w/o change and VS stable  /56  P 72   
 
 3.8Kgs removed. Flushed both ports with 10 mL of NS.  CVC dressing clean, dry, and intact, tego caps intact, bilateral lumens wrapped with 4x4 gauze. Patient remains in room

## 2020-01-29 NOTE — DIALYSIS
Consent verified for renal replacement therapy. HD initiated using RTPC. 3K bath. Machine settings per MD order. Pt alert oriented to self. . Will monitor during treatment.

## 2020-01-30 NOTE — PROGRESS NOTES
Problem: Mobility Impaired (Adult and Pediatric) Goal: *Acute Goals and Plan of Care (Insert Text) Description STG: 
(1.)Patient will roll side to side in bed with  MAXIMAL ASSIST within 3-5 day(s). (2.)Patient will move from supine to sit and sit to supine  in bed with  MAXIMAL ASSIST within 3-5 day(s). (3.)Patient will sit edge of bed/chair with STAND BY ASSIST and good balance statically  for 10 minutes within 3-5 day(s). 4.  Patient will perform 1 sets of 10 repetitions of active range of motion exercises for bilateral lower extremity(s) with  cueing within 3-5 day(s). 5.  Patient will tolerate sitting with bed in chair position for 2 hour 2x/day to facilitate tolerance to sitting in chair within 3-5 days. LTG: 
(1.)Patient will move from supine to sit and sit to supine  and roll side to side in bed with  MODERATE ASSIST  within 7-10 day(s). (2.)Patient will transfer from bed to chair and chair to bed with  MAXIMAL ASSIST using the least restrictive device within 7-10 day(s). (3.)Patient will stand with  MINIMAL ASSIST for 2 minutes with the least restrictive device within 7-10 day(s). 4.  Patient will exhibit 3/5 strength B LE's to facilitate increased independence with bed mobility within 7-10 days. Outcome: Progressing Towards Goal 
  
PHYSICAL THERAPY: Daily Note and PM 1/30/2020 INPATIENT: PT Visit Days : 5 Payor: Elaine Reeder / Plan: Samantha Jean / Product Type: SureVisit Care Medicare /   
  
NAME/AGE/GENDER: Priya Guevara is a 69 y.o. male PRIMARY DIAGNOSIS: Atherosclerosis of native coronary artery of native heart with unstable angina pectoris ([facility] [address] [I25.110] Nonrheumatic aortic valve stenosis [I35.0] CAD (coronary artery disease) [I25.10] Aortic valve stenosis [I35.0] S/P CABG x 3 S/P CABG x 3 Procedure(s) (LRB): 
CORONARY ARTERY BYPASS GRAFT WITH AVR/ (CABG X 3 )/ LIMA (N/A) VEIN HARVEST/ GREATER SAPHENOUS (Left) ESOPHAGEAL TRANS ECHOCARDIOGRAM (N/A) 20 Days Post-Op ICD-10: Treatment Diagnosis:  
 · Other abnormalities of gait and mobility (R26.89) Precaution/Allergies: 
Heparin; Amoxicillin; Keflex [cephalexin]; and Pcn [penicillins] ASSESSMENT:  
 
Mr. Zaheer Salas underwent above surgery on 1/10/20 and was only recently extubated. He lives with his wife and ambulates independently with cane or RW at baseline. 1/30/20 pm: Pt seen in CVICU,  Supine in bed. Patient agreeable. RN at bedside. Patient on arivo. Patient is moved to sitting edge of bed with moderate to maximum assist x 2. Sitting balance needs support. With the use of the stand up lift the patient able to tolerate standing and weight bearing. Patient is transferred to the recliner and is positioned for comfort. Patient needed verbal cues to correct posture while standing in the lift but he was not able to correct. Patient performed exercises while in the bed. Patient is left with the RN attending. More  progress demonstrated today. PT to cont to follow for acute care needs. Will need extensive therapy at discharge. PM note:  Patient is sitting in the recliner and is assisted back to bed with the use of the stand up lift. Patient does require the assist of 2-3 persons however he is noted to be more helpful this pm, as he is trying to lean forward and when standing with the lift the patient is trying to improve his posture. Patient is returned to supine in bed and positioned for comfort with needs within reach. RN is at bedside caring for the patient. He did better this afternoon with his participation. Vitals remained stable. Progress demonstrated. Patient is noted to have his feet wrapped. Will continue PT efforts. This section established at most recent assessment PROBLEM LIST (Impairments causing functional limitations): 1. Decreased Strength 2. Decreased ADL/Functional Activities 3. Decreased Transfer Abilities 4. Decreased Ambulation Ability/Technique 5. Decreased Balance 6. Decreased Activity Tolerance 7. Increased Fatigue 8. Increased Shortness of Breath 9. Decreased Knowledge of Precautions INTERVENTIONS PLANNED: (Benefits and precautions of physical therapy have been discussed with the patient.) 1. Balance Exercise 2. Bed Mobility 3. Neuromuscular Re-education/Strengthening 4. Therapeutic Activites 5. Therapeutic Exercise/Strengthening 6. Transfer Training 7. education TREATMENT PLAN: Frequency/Duration: twice daily for duration of hospital stay Rehabilitation Potential For Stated Goals: Good REHAB RECOMMENDATIONS (at time of discharge pending progress):   
Placement: It is my opinion, based on this patient's performance to date, that Mr. Elijah Rogers may benefit from intensive therapy at a [address] [place] [address] after discharge due to the functional deficits listed above that are likely to improve with skilled rehabilitation and severe debility. Equipment:  
? tbd  
? None at this time HISTORY:  
History of Present Injury/Illness (Reason for Referral): 
Patient admitted for above surgery. Past Medical History/Comorbidities:  
Mr. Elijah Rogers  has a past medical history of Arthritis, BPH (benign prostatic hyperplasia) (1/14/2013), CAD (coronary artery disease), DM type 2 (diabetes mellitus, type 2) ([facility] [address] (dx 2004), Dyspnea, Gout (1/14/2013), HLD (hyperlipidemia) (1/14/2013), HTN (hypertension), Morbid obesity ([facility] [address] (9/3/14), Psychiatric disorder, Rheumatic fever, Seasonal allergic rhinitis, Severe aortic valve stenosis, Thyroid disease, and Unspecified sleep apnea (2016). Mr. Elijah Rogers  has a past surgical history that includes hx tonsil and adenoidectomy; hx heart catheterization (12/23/2019); hx orthopaedic (Left); and hx cataract removal (Bilateral, 2012). Social History/Living Environment:  
Home Environment: Private residence One/Two Story Residence: One story Living Alone: No 
 Support Systems: Spouse/Significant Other/Partner Patient Expects to be Discharged to[de-identified] Private residence Current DME Used/Available at Home: Cane, straight Prior Level of Function/Work/Activity: 
 
He lives with his wife and ambulates independently with cane or RW at baseline. Number of Personal Factors/Comorbidities that affect the Plan of Care: 3+: HIGH COMPLEXITY EXAMINATION:  
Most Recent Physical Functioning:  
Gross Assessment: 
  
         
  
Posture: 
  
Balance: 
Sitting: Impaired Sitting - Static: Fair (occasional); Prop sitting Sitting - Dynamic: Poor (constant support) Standing: (within the stand up lift) Bed Mobility: 
Rolling: Moderate assistance;Assist x2 Supine to Sit: Moderate assistance;Assist x2 Scooting: Moderate assistance;Assist x2 Wheelchair Mobility: 
  
Transfers: 
  
Gait: 
  
   
  
Body Structures Involved: 1. Heart 2. Lungs 3. Muscles Body Functions Affected: 1. Cardio 2. Respiratory 3. Neuromusculoskeletal 
4. Movement Related Activities and Participation Affected: 1. Mobility 2. Self Care 3. Domestic Life 4. Community, Social and [place] Number of elements that affect the Plan of Care: 4+: HIGH COMPLEXITY CLINICAL PRESENTATION:  
Presentation: Evolving clinical presentation with changing clinical characteristics: MODERATE COMPLEXITY CLINICAL DECISION MAKIN \[facility]\"" [address] [id number] AM-PAC 6 Clicks Basic Mobility Inpatient Short Form How much difficulty does the patient currently have. .. Unable A Lot A Little None 1. Turning over in bed (including adjusting bedclothes, sheets and blankets)? [x] 1   [] 2   [] 3   [] 4  
2. Sitting down on and standing up from a chair with arms ( e.g., wheelchair, bedside commode, etc.)   [x] 1   [] 2   [] 3   [] 4  
3. Moving from lying on back to sitting on the side of the bed? [x] 1   [] 2   [] 3   [] 4 How much help from another person does the patient currently need. .. Total A Lot A Little None 4. Moving to and from a bed to a chair (including a wheelchair)? [x] 1   [] 2   [] 3   [] 4  
5. Need to walk in hospital room? [x] 1   [] 2   [] 3   [] 4  
6. Climbing 3-5 steps with a railing? [x] 1   [] 2   [] 3   [] 4  
© 2007, Trustees of [address] [place] [address], under license to [employer]. All rights reserved Score:  Initial: 6 Most Recent: X (Date: -- ) Interpretation of Tool:  Represents activities that are increasingly more difficult (i.e. Bed mobility, Transfers, Gait). Medical Necessity:    
· Patient is expected to demonstrate progress in  
· strength, range of motion, balance, coordination, functional technique, and activity tolerance ·  to  
· decrease assistance required with all functional mobility · . Reason for Services/Other Comments: 
· Patient continues to require skilled intervention due to · medical complications and patient unable to attend/participate in therapy as expected · . Use of outcome tool(s) and clinical judgement create a POC that gives a: Questionable prediction of patient's progress: MODERATE COMPLEXITY  
  
 
 
 
TREATMENT:  
(In addition to Assessment/Re-Assessment sessions the following treatments were rendered) Pre-treatment Symptoms/Complaints:  \"OK\" Pain: Initial:  
Pain Intensity 1: 0  Post Session:  0/10 post session Therapeutic Activity: (    23 minutes): Therapeutic activities including Bed transfers, sit to stand transfers, static standing balance, posture ground to improve mobility, strength and balance. Required moderate cues for breathing while standing   to promote dynamic balance in standing. Therapeutic Exercise: (  minutes):  Exercises per grid below to improve mobility, strength, balance and coordination. Required minimum to moderate verbal cues to participate and perform correctly. Progressed resistance and repetitions as indicated. DATE: 1/26/20 1/29/20 Straight leg raise Hip abduct/ adduct X5 AAB Heel slides  X3 AB Hip external/ internal rotation Ankle dorsiflexion/ plantarflexion X5 AB 25 Sitting unsupported x5'       
LAQ  25 marching 25 Key:  A=active, AA=active assisted, P=passive, B=bilaterally, R=right, L=left Braces/Orthotics/Lines/Etc:  
· IV 
· brown catheter · Wound vac · O2 Device: Heated, Hi flow nasal cannula Treatment/Session Assessment:   
· Response to Treatment:  Fatigued after treatment, but did better with vitals · Interdisciplinary Collaboration:  
o Physical Therapy Assistant 
o Registered Nurse · After treatment position/precautions:  
o Supine in bed 
o Bed/Chair-wheels locked 
o Nurse at bedside · Compliance with Program/Exercises: Will assess as treatment progresses · Recommendations/Intent for next treatment session: \"Next visit will focus on advancements to more challenging activities and reduction in assistance provided\". Total Treatment Duration: PT Patient Time In/Time Out Time In: 1424 Time Out: 3722 Brian Pratt PTA

## 2020-01-30 NOTE — PROGRESS NOTES
Bedside and verbal report received from [place], [address] [place] [address]. Argatroban and amiodarone verified at bedside

## 2020-01-30 NOTE — PROGRESS NOTES
A follow up visit was made to the patient. Emotional support, spiritual presence and  
prayer were provided for the patient. EDMOND Stern

## 2020-01-30 NOTE — PROGRESS NOTES
[facility] PROGRESS NOTE Follow up for: 
 
Subjective:  
Patient seen and examined at bedside. ROS: 
Gen - no fever, no chills CV - no chest pain, no orthopnea Lung - + shortness of breath, no cough Abd - no tenderness, no nausea, no vomiting Ext - + edema Objective:  
Exam: 
Vitals:  
 01/30/20 0559 01/30/20 0630 01/30/20 6469 01/30/20 1012 BP: 102/52 105/55 Pulse: 72 74 Resp: 25 22 Temp:      
SpO2: 97% 97%  94% Weight:   130.4 kg (287 lb 7.7 oz) Height:      
 
 
 
Intake/Output Summary (Last 24 hours) at 1/30/2020 5795 Last data filed at 1/30/2020 9860 Gross per 24 hour Intake 1044.71 ml Output 3895 ml Net -2850.29 ml  
 
 
Current Facility-Administered Medications Medication Dose Route Frequency  albuterol-ipratropium (DUO-NEB) 2.5 MG-0.5 MG/3 ML  3 mL Nebulization QID RT  
 amiodarone (CORDARONE) tablet 400 mg  400 mg Oral Q12H  
 famotidine (PEPCID) tablet 20 mg  20 mg Oral DAILY  nitroglycerin (NITROBID) 2 % ointment 1 Inch  1 Inch Topical TID  insulin regular (NOVOLIN R, HUMULIN R) injection 0-15 Units  0-15 Units SubCUTAneous AC&HS  ondansetron (ZOFRAN) injection 4 mg  4 mg IntraVENous Q6H PRN  
 busPIRone (BUSPAR) tablet 10 mg  10 mg Oral TID  morphine 10 mg/ml injection 5 mg  5 mg IntraVENous Q4H PRN  
 oxyCODONE-acetaminophen (PERCOCET) 5-325 mg per tablet 1 Tab  1 Tab Per NG tube Q4H PRN  
 amiodarone (CORDARONE) 450 mg in dextrose 5% 250 mL infusion  0.5-1 mg/min IntraVENous TITRATE  traMADol (ULTRAM) tablet 50 mg  50 mg Oral Q6H PRN  
 epoetin maritza-epbx (RETACRIT) 14,000 Units combo injection  14,000 Units SubCUTAneous Q7D  
 argatroban 50 mg in 0.9% sodium chloride 50 mL (1000 mcg/mL) infusion  0.5-10 mcg/kg/min IntraVENous TITRATE  alcohol 62% (NOZIN) nasal  1 Ampule  1 Ampule Topical Q12H  
 NUTRITIONAL SUPPORT ELECTROLYTE PRN ORDERS   Does Not Apply PRN  
  NOREPINephrine (LEVOPHED) 16,000 mcg in dextrose 5% 250 mL infusion  0.05-0.2 mcg/kg/min IntraVENous TITRATE  acetaminophen (TYLENOL) solution 650 mg  650 mg Per NG tube Q4H PRN  
 sodium chloride (NS) flush 5-40 mL  5-40 mL IntraVENous Q8H  
 sodium chloride (NS) flush 5-40 mL  5-40 mL IntraVENous PRN  
 naloxone (NARCAN) injection 0.4 mg  0.4 mg IntraVENous PRN  
 [Held by provider] metoprolol tartrate (LOPRESSOR) tablet 25 mg  25 mg Oral Q12H  
 atorvastatin (LIPITOR) tablet 80 mg  80 mg Oral QHS  dextrose (D50W) injection syrg 12.5 g  25 mL IntraVENous PRN  
 [Held by provider] aspirin chewable tablet 81 mg  81 mg Oral DAILY  magnesium sulfate 1 g/100 ml IVPB (premix or compounded)  1 g IntraVENous PRN  
 midazolam (VERSED) injection 1 mg  1 mg IntraVENous Q1H PRN  
 sodium bicarbonate (8.4%) injection 50 mEq  50 mEq IntraVENous PRN  
 
 
EXAM 
GEN - Alert and awake CV - S1, S2, RRR, no rub, murmur, or gallop Lung - clear anteriorly, diminished at bases Abd - soft, nontender, BS present Ext - 2+ edema Recent Labs  
  01/30/20 
0626 01/29/20 
0307 01/28/20 
0321 WBC 13.8* 15.8* 16.4* HGB 8.4* 8.8* 8.6* HCT 26.6* 28.3* 28.1*  
* 109* 100* Recent Labs  
  01/30/20 
5097 01/29/20 
0307 01/28/20 
0321  139 139  
K 4.3 4.4 4.4  103 102 CO2 29 31 28 BUN 37* 33* 28* CREA 4.54* 3.85* 3.10* CA 8.3 8.2* 8.2*  
* 142* 162* MG  --  2.2  --   
PHOS  --  3.8*  --   
 
 
Assessment and Plan:  
1) DEJUAN on CKD-   Plan to transition to a thrice weekly schedule. Next HD Friday 2) HTN/Volume-   
 
3) Anemia-  Monitoring. 4) Respiratory failure- extubated on HHFNC. UF with HD today. 5) S/p CABG Uli Sheets MD

## 2020-01-30 NOTE — PROGRESS NOTES
A follow up visit was made to the patient. Emotional support, spiritual presence and  
prayer were provided for the patient. EDMOND Handley

## 2020-01-30 NOTE — PROGRESS NOTES
Asked to start a PIV. Using U/S assistance, placed a 20g 1\" PIV in patients left forearm. Primary RN informed.   Garfield Walsh RN, VAT

## 2020-01-30 NOTE — PROGRESS NOTES
Bedside and Verbal shift change report given to [address] [place] [address] Risk Ident (oncoming nurse) by Erasmo Espana RN (offgoing nurse). Report given with SBAR, Procedure Summary, Intake/Output, MAR and Recent Results.

## 2020-01-30 NOTE — PROGRESS NOTES
Called to CV ICU for reassessment of ischemic digits. Left 4th toe bursting diagonally across nail bed, serous drainage, scant dried blood. All toes except 5th toes bilaterally and left 4th very edematous and purple. Hands and fingers with slight but noticeable improvement, especially bilateral index fingers, which have smaller areas of deep purple discoloration but base finger color more pink and normal. 
- continue NTG paste to fingers and toes 
- consult wound care for toe preservation / care if salvageable Girish Shah PA-C Physician Assistant with [facility].  Shivani Rowley MD / Yue Lion MD

## 2020-01-30 NOTE — PROGRESS NOTES
Problem: Mobility Impaired (Adult and Pediatric) Goal: *Acute Goals and Plan of Care (Insert Text) Description STG: 
(1.)Patient will roll side to side in bed with  MAXIMAL ASSIST within 3-5 day(s). (2.)Patient will move from supine to sit and sit to supine  in bed with  MAXIMAL ASSIST within 3-5 day(s). (3.)Patient will sit edge of bed/chair with STAND BY ASSIST and good balance statically  for 10 minutes within 3-5 day(s). 4.  Patient will perform 1 sets of 10 repetitions of active range of motion exercises for bilateral lower extremity(s) with  cueing within 3-5 day(s). 5.  Patient will tolerate sitting with bed in chair position for 2 hour 2x/day to facilitate tolerance to sitting in chair within 3-5 days. LTG: 
(1.)Patient will move from supine to sit and sit to supine  and roll side to side in bed with  MODERATE ASSIST  within 7-10 day(s). (2.)Patient will transfer from bed to chair and chair to bed with  MAXIMAL ASSIST using the least restrictive device within 7-10 day(s). (3.)Patient will stand with  MINIMAL ASSIST for 2 minutes with the least restrictive device within 7-10 day(s). 4.  Patient will exhibit 3/5 strength B LE's to facilitate increased independence with bed mobility within 7-10 days. Outcome: Progressing Towards Goal 
  
PHYSICAL THERAPY: Daily Note and AM 1/30/2020 INPATIENT: PT Visit Days : 5 Payor: Joey Onofre / Plan: Erika Young / Product Type: [employer] /   
  
NAME/AGE/GENDER: Jase Mckeon is a 69 y.o. male PRIMARY DIAGNOSIS: Atherosclerosis of native coronary artery of native heart with unstable angina pectoris ([facility] [address] [I25.110] Nonrheumatic aortic valve stenosis [I35.0] CAD (coronary artery disease) [I25.10] Aortic valve stenosis [I35.0] S/P CABG x 3 S/P CABG x 3 Procedure(s) (LRB): 
CORONARY ARTERY BYPASS GRAFT WITH AVR/ (CABG X 3 )/ LIMA (N/A) VEIN HARVEST/ GREATER SAPHENOUS (Left) ESOPHAGEAL TRANS ECHOCARDIOGRAM (N/A) 20 Days Post-Op ICD-10: Treatment Diagnosis:  
 · Other abnormalities of gait and mobility (R26.89) Precaution/Allergies: 
Heparin; Amoxicillin; Keflex [cephalexin]; and Pcn [penicillins] ASSESSMENT:  
 
Mr. Jes Flores underwent above surgery on 1/10/20 and was only recently extubated. He lives with his wife and ambulates independently with cane or RW at baseline. 1/30/20 pm: Pt seen in CVICU,  Supine in bed. Patient agreeable. RN at bedside. Patient on arivo. Patient is moved to sitting edge of bed with moderate to maximum assist x 2. Sitting balance needs support. With the use of the stand up lift the patient able to tolerate standing and weight bearing. Patient is transferred to the recliner and is positioned for comfort. Patient needed verbal cues to correct posture while standing in the lift but he was not able to correct. Patient performed exercises while in the bed. Patient is left with the RN attending. More  progress demonstrated today. PT to cont to follow for acute care needs. Will need extensive therapy at discharge. This section established at most recent assessment PROBLEM LIST (Impairments causing functional limitations): 1. Decreased Strength 2. Decreased ADL/Functional Activities 3. Decreased Transfer Abilities 4. Decreased Ambulation Ability/Technique 5. Decreased Balance 6. Decreased Activity Tolerance 7. Increased Fatigue 8. Increased Shortness of Breath 9. Decreased Knowledge of Precautions INTERVENTIONS PLANNED: (Benefits and precautions of physical therapy have been discussed with the patient.) 1. Balance Exercise 2. Bed Mobility 3. Neuromuscular Re-education/Strengthening 4. Therapeutic Activites 5. Therapeutic Exercise/Strengthening 6. Transfer Training 7. education TREATMENT PLAN: Frequency/Duration: twice daily for duration of hospital stay Rehabilitation Potential For Stated Goals: Good REHAB RECOMMENDATIONS (at time of discharge pending progress):   
Placement: It is my opinion, based on this patient's performance to date, that Mr. Jose Manuel Roberson may benefit from intensive therapy at a [facility] [facility] after discharge due to the functional deficits listed above that are likely to improve with skilled rehabilitation and severe debility. Equipment:  
? tbd  
? None at this time HISTORY:  
History of Present Injury/Illness (Reason for Referral): 
Patient admitted for above surgery. Past Medical History/Comorbidities:  
Mr. Jose Manuel Roberson  has a past medical history of Arthritis, BPH (benign prostatic hyperplasia) (1/14/2013), CAD (coronary artery disease), DM type 2 (diabetes mellitus, type 2) ([facility] [address] (dx 2004), Dyspnea, Gout (1/14/2013), HLD (hyperlipidemia) (1/14/2013), HTN (hypertension), Morbid obesity ([facility] [address] (9/3/14), Psychiatric disorder, Rheumatic fever, Seasonal allergic rhinitis, Severe aortic valve stenosis, Thyroid disease, and Unspecified sleep apnea (2016). Mr. Jose Manuel Roberson  has a past surgical history that includes hx tonsil and adenoidectomy; hx heart catheterization (12/23/2019); hx orthopaedic (Left); and hx cataract removal (Bilateral, 2012). Social History/Living Environment:  
Home Environment: Private residence One/Two Story Residence: One story Living Alone: No 
Support Systems: Spouse/Significant Other/Partner Patient Expects to be Discharged to[de-identified] Private residence Current DME Used/Available at Home: Cane, straight Prior Level of Function/Work/Activity: 
 
He lives with his wife and ambulates independently with cane or RW at baseline. Number of Personal Factors/Comorbidities that affect the Plan of Care: 3+: HIGH COMPLEXITY EXAMINATION:  
Most Recent Physical Functioning:  
Gross Assessment: 
  
         
  
Posture: 
  
Balance: 
Sitting: Impaired Sitting - Static: Fair (occasional); Prop sitting Sitting - Dynamic: Poor (constant support) Standing: (within the stand up lift) Bed Mobility: 
Rolling: Moderate assistance;Assist x2 Supine to Sit: Moderate assistance;Assist x2 Scooting: Moderate assistance;Assist x2 Wheelchair Mobility: 
  
Transfers: 
  
Gait: 
  
   
  
Body Structures Involved: 1. Heart 2. Lungs 3. Muscles Body Functions Affected: 1. Cardio 2. Respiratory 3. Neuromusculoskeletal 
4. Movement Related Activities and Participation Affected: 1. Mobility 2. Self Care 3. Domestic Life 4. Community, Social and [place] Number of elements that affect the Plan of Care: 4+: HIGH COMPLEXITY CLINICAL PRESENTATION:  
Presentation: Evolving clinical presentation with changing clinical characteristics: MODERATE COMPLEXITY CLINICAL DECISION MAKIN[address] [place] [id number] AM-PAC 6 Clicks Basic Mobility Inpatient Short Form How much difficulty does the patient currently have. .. Unable A Lot A Little None 1. Turning over in bed (including adjusting bedclothes, sheets and blankets)? [x] 1   [] 2   [] 3   [] 4  
2. Sitting down on and standing up from a chair with arms ( e.g., wheelchair, bedside commode, etc.)   [x] 1   [] 2   [] 3   [] 4  
3. Moving from lying on back to sitting on the side of the bed? [x] 1   [] 2   [] 3   [] 4 How much help from another person does the patient currently need. .. Total A Lot A Little None 4. Moving to and from a bed to a chair (including a wheelchair)? [x] 1   [] 2   [] 3   [] 4  
5. Need to walk in hospital room? [x] 1   [] 2   [] 3   [] 4  
6. Climbing 3-5 steps with a railing? [x] 1   [] 2   [] 3   [] 4  
© , Trustees of [address] [place] [id number], under license to [employer]. All rights reserved Score:  Initial: 6 Most Recent: X (Date: -- ) Interpretation of Tool:  Represents activities that are increasingly more difficult (i.e. Bed mobility, Transfers, Gait).  
 
Medical Necessity:    
· Patient is expected to demonstrate progress in  
 · strength, range of motion, balance, coordination, functional technique, and activity tolerance ·  to  
· decrease assistance required with all functional mobility · . Reason for Services/Other Comments: 
· Patient continues to require skilled intervention due to · medical complications and patient unable to attend/participate in therapy as expected · . Use of outcome tool(s) and clinical judgement create a POC that gives a: Questionable prediction of patient's progress: MODERATE COMPLEXITY  
  
 
 
 
TREATMENT:  
(In addition to Assessment/Re-Assessment sessions the following treatments were rendered) Pre-treatment Symptoms/Complaints:  \"Can't breath\" Pain: Initial:  
Pain Intensity 1: 0  Post Session:  0/10 post session Therapeutic Activity: (    23 minutes): Therapeutic activities including Bed transfers, sit to stand transfers, static standing balance, posture ground to improve mobility, strength and balance. Required moderate cues for breathing while standing   to promote dynamic balance in standing. Therapeutic Exercise: (  minutes):  Exercises per grid below to improve mobility, strength, balance and coordination. Required minimum to moderate verbal cues to participate and perform correctly. Progressed resistance and repetitions as indicated. DATE: 1/26/20 1/29/20 Straight leg raise Hip abduct/ adduct X5 AAB Heel slides  X3 AB Hip external/ internal rotation Ankle dorsiflexion/ plantarflexion X5 AB 25 Sitting unsupported x5'       
LAQ  25      
marching 25 Key:  A=active, AA=active assisted, P=passive, B=bilaterally, R=right, L=left Braces/Orthotics/Lines/Etc:  
· IV 
· brown catheter · Wound vac · O2 Device: Heated, Hi flow nasal cannula Treatment/Session Assessment:   
· Response to Treatment:  Fatigued after treatment, but did better with vitals · Interdisciplinary Collaboration:  
o Physical Therapy Assistant o Registered Nurse · After treatment position/precautions:  
o Up in chair 
o Bed/Chair-wheels locked 
o Nurse at bedside · Compliance with Program/Exercises: Will assess as treatment progresses · Recommendations/Intent for next treatment session: \"Next visit will focus on advancements to more challenging activities and reduction in assistance provided\". Total Treatment Duration: PT Patient Time In/Time Out Time In: 5431 Time Out: 4203 Daksha Miller, PTA

## 2020-01-30 NOTE — PROGRESS NOTES
POD 20 Days Post-Op Procedure:  Procedure(s): CORONARY ARTERY BYPASS GRAFT WITH AVR/ (CABG X 3 )/ LIMA VEIN HARVEST/ GREATER SAPHENOUS 
ESOPHAGEAL TRANS ECHOCARDIOGRAM 
 
 
Subjective:  
 
Patient has No significant medical complaints Objective:  
 
Patient Vitals for the past 8 hrs: 
 BP Pulse Resp SpO2 Weight 20 0637     287 lb 7.7 oz (130.4 kg) 20 0630 105/55 74 22 97 %   
20 0559 102/52 72 25 97 %   
20 0529 105/55 72 20 96 %   
20 0500 113/56 72 18 97 %   
20 0429 112/53 73  96 %   
20 0359 104/52 69 27 95 %   
20 0329 105/58 70 18 95 %   
20 0300 103/57 69 17 96 %   
20 0229 97/52 74 20 98 %   
20 0159 105/53 70 21 96 %   
20 0130 102/51 72 29 94 %   
20 0059 116/58 80 (!) 59 96 %   
20 0030 108/57 70 19 97 %   
20 2359 107/55 69 20 97 %  Temp (24hrs), Av.2 °F (36.8 °C), Min:98.1 °F (36.7 °C), Max:98.3 °F (36.8 °C) Hemodynamics PAP Systolic: 50 PAP 
CO (l/min): 7.5 l/min CO 
CI (l/min/m2): 2.9 l/min/m2 CI No intake/output data recorded.  1901 -  0700 In: 2036.7 [P.O.:960; I.V.:1076.7] Out: 7609 [Urine:110; Drains:25] CT Drainage 
  
  
   
  total of all CT's Heart:  regular rate and rhythm, S1, S2 normal, no murmur, click, rub or gallop Lung:  clear to auscultation bilaterally Neuro: Grossly non focal 
Incisions: Clean, dry, and intact Labs: 
Recent Results (from the past 24 hour(s)) GLUCOSE, POC Collection Time: 20  7:46 AM  
Result Value Ref Range Glucose (POC) 159 (H) 65 - 100 mg/dL GLUCOSE, POC Collection Time: 20 11:26 AM  
Result Value Ref Range Glucose (POC) 168 (H) 65 - 100 mg/dL GLUCOSE, POC Collection Time: 20  4:30 PM  
Result Value Ref Range Glucose (POC) 266 (H) 65 - 100 mg/dL GLUCOSE, POC Collection Time: 20  9:03 PM  
Result Value Ref Range Glucose (POC) 337 (H) 65 - 100 mg/dL METABOLIC PANEL, BASIC Collection Time: 01/30/20  6:25 AM  
Result Value Ref Range Sodium 136 136 - 145 mmol/L Potassium 4.3 3.5 - 5.1 mmol/L Chloride 100 98 - 107 mmol/L  
 CO2 29 21 - 32 mmol/L Anion gap 7 7 - 16 mmol/L Glucose 162 (H) 65 - 100 mg/dL BUN 37 (H) 8 - 23 MG/DL Creatinine 4.54 (H) 0.8 - 1.5 MG/DL  
 GFR est AA 16 (L) >60 ml/min/1.73m2 GFR est non-AA 14 (L) >60 ml/min/1.73m2 Calcium 8.3 8.3 - 10.4 MG/DL  
PTT Collection Time: 01/30/20  6:25 AM  
Result Value Ref Range aPTT 58.2 (H) 24.7 - 39.8 SEC  
CBC W/O DIFF Collection Time: 01/30/20  6:26 AM  
Result Value Ref Range WBC 13.8 (H) 4.3 - 11.1 K/uL  
 RBC 2.74 (L) 4.23 - 5.6 M/uL HGB 8.4 (L) 13.6 - 17.2 g/dL HCT 26.6 (L) 41.1 - 50.3 % MCV 97.1 79.6 - 97.8 FL  
 MCH 30.7 26.1 - 32.9 PG  
 MCHC 31.6 31.4 - 35.0 g/dL  
 RDW 15.2 (H) 11.9 - 14.6 % PLATELET 524 (L) 669 - 450 K/uL MPV 11.0 9.4 - 12.3 FL ABSOLUTE NRBC 0.00 0.0 - 0.2 K/uL GLUCOSE, POC Collection Time: 01/30/20  6:31 AM  
Result Value Ref Range Glucose (POC) 153 (H) 65 - 100 mg/dL Assessment:  
 
Principal Problem: 
  CAD (coronary artery disease) (1/10/2020) Active Problems: Aortic valve stenosis (1/10/2020) Weaning from respirator [facility]) (1/10/2020) Acute hypoxemic respiratory failure ([address] (1/10/2020) S/P CABG x 3 (1/10/2020) S/P AVR (1/10/2020) Acute renal failure (ARF) ([address] (1/11/2020) Atrial fibrillation ([address] (1/13/2020) Shock ([address] (1/15/2020) Bilateral pneumothorax (1/17/2020) Thrombocytopenia ([address] (1/17/2020) HIT (heparin-induced thrombocytopenia) ([address] (1/23/2020) Plan/Recommendations/Medical Decision Making:  
 
plts 117K, PT/OT, supportive care See orders

## 2020-01-30 NOTE — PROGRESS NOTES
Martha Mendoza Admission Date: 1/10/2020 Daily Progress Note: 1/30/2020 The patient's chart is reviewed and the patient is discussed with the staff. Patient had CABG x 3 and AVR on 1/10.  Slow to improve. Had small ptx that self resolved. 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 Subjective:  
 
Still on optiflow 40 L /40 %. Awake and alert. Leukocytosis improving Fluid balance net negative 2.4L yesterday Still has distal dopplerable pulses Review of Systems: 
-Fever 
-Headaches 
-Chest pain 
-Dyspnea, -wheezing,- cough 
-Abdominal pain, -constipation +Leg swelling +cool fingers/toes All other organ systems grossly normal. 
 
 
 
Current Facility-Administered Medications Medication Dose Route Frequency  albuterol-ipratropium (DUO-NEB) 2.5 MG-0.5 MG/3 ML  3 mL Nebulization QID RT  
 amiodarone (CORDARONE) tablet 400 mg  400 mg Oral Q12H  
 famotidine (PEPCID) tablet 20 mg  20 mg Oral DAILY  nitroglycerin (NITROBID) 2 % ointment 1 Inch  1 Inch Topical TID  insulin regular (NOVOLIN R, HUMULIN R) injection 0-15 Units  0-15 Units SubCUTAneous AC&HS  ondansetron (ZOFRAN) injection 4 mg  4 mg IntraVENous Q6H PRN  
 busPIRone (BUSPAR) tablet 10 mg  10 mg Oral TID  morphine 10 mg/ml injection 5 mg  5 mg IntraVENous Q4H PRN  
 oxyCODONE-acetaminophen (PERCOCET) 5-325 mg per tablet 1 Tab  1 Tab Per NG tube Q4H PRN  
 amiodarone (CORDARONE) 450 mg in dextrose 5% 250 mL infusion  0.5-1 mg/min IntraVENous TITRATE  traMADol (ULTRAM) tablet 50 mg  50 mg Oral Q6H PRN  
 epoetin maritza-epbx (RETACRIT) 14,000 Units combo injection  14,000 Units SubCUTAneous Q7D  
 argatroban 50 mg in 0.9% sodium chloride 50 mL (1000 mcg/mL) infusion  0.5-10 mcg/kg/min IntraVENous TITRATE  alcohol 62% (NOZIN) nasal  1 Ampule  1 Ampule Topical Q12H  
 NUTRITIONAL SUPPORT ELECTROLYTE PRN ORDERS   Does Not Apply PRN  
 NOREPINephrine (LEVOPHED) 16,000 mcg in dextrose 5% 250 mL infusion  0.05-0.2 mcg/kg/min IntraVENous TITRATE  acetaminophen (TYLENOL) solution 650 mg  650 mg Per NG tube Q4H PRN  
 sodium chloride (NS) flush 5-40 mL  5-40 mL IntraVENous Q8H  
 sodium chloride (NS) flush 5-40 mL  5-40 mL IntraVENous PRN  
 naloxone (NARCAN) injection 0.4 mg  0.4 mg IntraVENous PRN  
 [Held by provider] metoprolol tartrate (LOPRESSOR) tablet 25 mg  25 mg Oral Q12H  
 atorvastatin (LIPITOR) tablet 80 mg  80 mg Oral QHS  dextrose (D50W) injection syrg 12.5 g  25 mL IntraVENous PRN  
 [Held by provider] aspirin chewable tablet 81 mg  81 mg Oral DAILY  magnesium sulfate 1 g/100 ml IVPB (premix or compounded)  1 g IntraVENous PRN  
 midazolam (VERSED) injection 1 mg  1 mg IntraVENous Q1H PRN  
 sodium bicarbonate (8.4%) injection 50 mEq  50 mEq IntraVENous PRN Objective:  
 
Vitals:  
 01/30/20 7813 01/30/20 0559 01/30/20 0630 01/30/20 8357 BP: 105/55 102/52 105/55 Pulse: 72 72 74 Resp: 20 25 22 Temp:      
SpO2: 96% 97% 97% Weight:    287 lb 7.7 oz (130.4 kg) Height:      
 
Intake and Output:  
01/28 1901 - 01/30 0700 In: 2036.7 [P.O.:960; I.V.:1076.7] Out: 5884 [Urine:110; Drains:25] No intake/output data recorded. Physical Exam:  
Constitution:  the patient is well developed and in NO respiratory distress on optiflow EENMT:  Sclera clear, pupils equal, oral mucosa moist 
Respiratory: decreased sounds in left base Cardiovascular:  RRR without M,G,R 
Gastrointestinal: soft and non-tender; with positive bowel sounds. Musculoskeletal: moving all limbs. There is gross edema b/l. Noted cyanosis of fingers and toes. Left foot is warmer today, right toes are still cold. Finger time -- some on both hands are cold and cyanotic. Skin:  no jaundice or rashes, surgical wounds Neurologic: no gross neuro deficits Psychiatric:  Awake and alert. CHEST XRAY:   
None today. 1/23/20 IMPRESSION: 
Stable abnormal exam with tubes and lines as described above Atherosclerosis LAB No lab exists for component: [place] Recent Labs  
  01/30/20 
0626 01/29/20 
0307 01/28/20 
0321 WBC 13.8* 15.8* 16.4* HGB 8.4* 8.8* 8.6* HCT 26.6* 28.3* 28.1*  
* 109* 100* Recent Labs  
  01/30/20 
5143 01/29/20 
0307 01/28/20 
0321  139 139  
K 4.3 4.4 4.4  103 102 CO2 29 31 28 * 142* 162* BUN 37* 33* 28* CREA 4.54* 3.85* 3.10* MG  --  2.2  --   
CA 8.3 8.2* 8.2* PHOS  --  3.8*  --   
 
ABG:   
No results found for: PH, PHI, PCO2, PCO2I, PO2, PO2I, HCO3, HCO3I, FIO2, FIO2I Assessment:  (Medical Decision Making) Hospital Problems  Date Reviewed: 12/6/2019 Codes Class Noted POA  
 HIT (heparin-induced thrombocytopenia) ([facility]) ICD-10-CM: E45.02 ICD-9-CM: 289.84  1/23/2020 Unknown Bilateral pneumothorax ICD-10-CM: J93.9 ICD-9-CM: 512.89  1/17/2020 Unknown Thrombocytopenia ([facility] [address] ICD-10-CM: D69.6 ICD-9-CM: 287.5  1/17/2020 Unknown Shock ([facility] [address] ICD-10-CM: R57.9 ICD-9-CM: 785.50  1/15/2020 Unknown Atrial fibrillation [facility]) ICD-10-CM: I48.91 
ICD-9-CM: 427.31  1/13/2020 Unknown Acute renal failure (ARF) ([facility]) ICD-10-CM: N17.9 ICD-9-CM: 584.9  1/11/2020 Unknown Aortic valve stenosis ICD-10-CM: I35.0 ICD-9-CM: 424.1  1/10/2020 Unknown * (Principal) CAD (coronary artery disease) ICD-10-CM: I25.10 ICD-9-CM: 414.00  1/10/2020 Unknown Weaning from respirator [facility]) extubated ICD-10-CM: Z99.11 ICD-9-CM: V46.13  1/10/2020 Unknown Acute hypoxemic respiratory failure ([facility] [address] ICD-10-CM: J96.01 
ICD-9-CM: 518.81  1/10/2020 S/P CABG x 3 ICD-10-CM: Z95.1 ICD-9-CM: V45.81  1/10/2020 Unknown S/P AVR ICD-10-CM: Z95.2 ICD-9-CM: V43.3  1/10/2020 Unknown Patient with prolonged intubation s/p cabg and avr. Current indications are that he would likely fail extubation with his severe tachypnea and low tidal volumes. HIT + both on ROSA and SHARON. LE doppler with B lower extremity clot. Renal failure so unable to perform CT PE. Plan:  (Medical Decision Making) -- wean optiflow as able. -- continue argatroban and adjust per hematology -- mobilize ,PT 
-- Vascular Sx following -- CXR tomorrow Kyler Sorto MD

## 2020-01-30 NOTE — WOUND CARE
Left thigh wounds, wound vac dressing changed, 2x0.8cm blister adjacent to upper incision which popped with drape removal area cleansed with dermal wound , skin prep applied and incisional wound vac applied, seal achieved. Asked by vascular to assess 4th left toe, blisters noted to all toes which have popped, right foot also assessed and blisters have formed on 1st -4th toes, the 1st and 2nd toes on right foot have popped. 4th toe left foot degloved and toenail has peeled off, base is pink and nail bed is visible, nail my regrow. The other popped blisters may peel in a way to cause all the toes to deglove, may loose additional nails, patient informed, verbalized understanding. Will use xeroform to all popped blisters and wrap with radha. Elevate legs and use rooke boots when in recliner or bed.

## 2020-01-31 NOTE — DIALYSIS
Hemodialysis treatment completed without complications. Patient caox3 and VS stable  /67  P 81   
 
 3.8 Kgs removed. Flushed both ports with 10 mL of NS.  CVC dressing clean, dry, and intact, tego caps intact, bilateral lumens wrapped with 4x4 gauze. Patient remains in room.

## 2020-01-31 NOTE — PROGRESS NOTES
A follow up visit was made to the patient. Emotional support, spiritual presence and  
prayer were provided for the patient. He was alert and oriented. EDMOND Villafuerte

## 2020-01-31 NOTE — PROGRESS NOTES
[place] Admission Date: 1/10/2020 Daily Progress Note: 1/31/2020 The patient's chart is reviewed and the patient is discussed with the staff. Patient had CABG x 3 and AVR on 1/10.  Slow to improve. Had small ptx that self resolved. 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 Subjective:  
 
Patient resting in bed. On 40% airvo with sats %. Despite this he c/o feeling more short of breath today. Cough with minimal secretions. Current Facility-Administered Medications Medication Dose Route Frequency  albuterol-ipratropium (DUO-NEB) 2.5 MG-0.5 MG/3 ML  3 mL Nebulization QID RT  
 amiodarone (CORDARONE) tablet 400 mg  400 mg Oral Q12H  
 famotidine (PEPCID) tablet 20 mg  20 mg Oral DAILY  nitroglycerin (NITROBID) 2 % ointment 1 Inch  1 Inch Topical TID  insulin regular (NOVOLIN R, HUMULIN R) injection 0-15 Units  0-15 Units SubCUTAneous AC&HS  ondansetron (ZOFRAN) injection 4 mg  4 mg IntraVENous Q6H PRN  
 busPIRone (BUSPAR) tablet 10 mg  10 mg Oral TID  morphine 10 mg/ml injection 5 mg  5 mg IntraVENous Q4H PRN  
 oxyCODONE-acetaminophen (PERCOCET) 5-325 mg per tablet 1 Tab  1 Tab Per NG tube Q4H PRN  
 amiodarone (CORDARONE) 450 mg in dextrose 5% 250 mL infusion  0.5-1 mg/min IntraVENous TITRATE  traMADol (ULTRAM) tablet 50 mg  50 mg Oral Q6H PRN  
 epoetin maritza-epbx (RETACRIT) 14,000 Units combo injection  14,000 Units SubCUTAneous Q7D  
 argatroban 50 mg in 0.9% sodium chloride 50 mL (1000 mcg/mL) infusion  0.5-10 mcg/kg/min IntraVENous TITRATE  alcohol 62% (NOZIN) nasal  1 Ampule  1 Ampule Topical Q12H  
 NUTRITIONAL SUPPORT ELECTROLYTE PRN ORDERS   Does Not Apply PRN  
 acetaminophen (TYLENOL) solution 650 mg  650 mg Per NG tube Q4H PRN  
  sodium chloride (NS) flush 5-40 mL  5-40 mL IntraVENous Q8H  
 sodium chloride (NS) flush 5-40 mL  5-40 mL IntraVENous PRN  
 naloxone (NARCAN) injection 0.4 mg  0.4 mg IntraVENous PRN  
 [Held by provider] metoprolol tartrate (LOPRESSOR) tablet 25 mg  25 mg Oral Q12H  
 atorvastatin (LIPITOR) tablet 80 mg  80 mg Oral QHS  dextrose (D50W) injection syrg 12.5 g  25 mL IntraVENous PRN  
 [Held by provider] aspirin chewable tablet 81 mg  81 mg Oral DAILY  magnesium sulfate 1 g/100 ml IVPB (premix or compounded)  1 g IntraVENous PRN  
 midazolam (VERSED) injection 1 mg  1 mg IntraVENous Q1H PRN  
 sodium bicarbonate (8.4%) injection 50 mEq  50 mEq IntraVENous PRN Review of Systems Constitutional: negative for fever, chills, sweats Cardiovascular: + Le edema. negative for chest pain, palpitations, syncope Gastrointestinal: negative for dysphagia, reflux, vomiting, diarrhea, abdominal pain, or melena Neurologic: negative for focal weakness, numbness, headache Objective:  
 
Vitals:  
 01/31/20 0501 01/31/20 5083 01/31/20 0601 01/31/20 1724 BP: 112/56 132/61 133/68 Pulse: 66 71 72 Resp: 11 20 19 Temp:      
SpO2: 98% 99% 99% 100% Weight:      
Height:      
 
Intake and Output:  
01/29 1901 - 01/31 0700 In: Leonardo Sullivan [P.O.:1440; I.V.:455] Out: 60 [Urine:60] No intake/output data recorded. Physical Exam:  
Constitution:  the patient is well developed and in no acute distress EENMT:  Sclera clear, pupils equal, oral mucosa moist 
Respiratory: CTA B in anterior lung fields. Cardiovascular:  RRR without M,G,R 
Gastrointestinal: soft and non-tender; with positive bowel sounds. Musculoskeletal: warm without cyanosis. There is 3+ B lower leg edema. Skin:  no jaundice or rashes, surgical wounds Neurologic: no gross neuro deficits Psychiatric:  alert and oriented x ppt CHEST XRAY:  
1/31/20 
ordered LAB No lab exists for component: [place] Recent Labs  
  01/31/20 8718 01/30/20 
5217 01/29/20 
9393 WBC 12.0* 13.8* 15.8* HGB 8.1* 8.4* 8.8* HCT 26.3* 26.6* 28.3*  
* 117* 109* Recent Labs  
  01/31/20 
0323 01/30/20 
2222 01/29/20 
0073 * 136 139  
K 4.4 4.3 4.4 CL 99 100 103 CO2 28 29 31 * 162* 142* BUN 58* 37* 33* CREA 5.81* 4.54* 3.85* MG 2.3  --  2.2 CA 8.0* 8.3 8.2* PHOS 5.6*  --  3.8* ABG:  No results found for: PH, PHI, PCO2, PCO2I, PO2, PO2I, HCO3, HCO3I, FIO2, FIO2I Assessment:  (Medical Decision Making) Hospital Problems  Date Reviewed: 12/6/2019 Codes Class Noted POA  
 HIT (heparin-induced thrombocytopenia) ([facility]) ICD-10-CM: V32.12 ICD-9-CM: 289.84  1/23/2020 Unknown Bilateral pneumothorax ICD-10-CM: J93.9 ICD-9-CM: 512.89  1/17/2020 Unknown Thrombocytopenia ([facility] ICD-10-CM: D69.6 ICD-9-CM: 287.5  1/17/2020 Unknown Shock ([facility] ICD-10-CM: R57.9 ICD-9-CM: 785.50  1/15/2020 Unknown Atrial fibrillation [facility]) ICD-10-CM: I48.91 
ICD-9-CM: 427.31  1/13/2020 Unknown Acute renal failure (ARF) ([facility]) ICD-10-CM: N17.9 ICD-9-CM: 584.9  1/11/2020 Unknown Aortic valve stenosis ICD-10-CM: I35.0 ICD-9-CM: 424.1  1/10/2020 Unknown CAD (coronary artery disease) ICD-10-CM: I25.10 ICD-9-CM: 414.00  1/10/2020 Unknown Weaning from respirator [facility]) ICD-10-CM: Z99.11 ICD-9-CM: V46.13  1/10/2020 Unknown Acute hypoxemic respiratory failure ([facility] ICD-10-CM: J96.01 
ICD-9-CM: 518.81  1/10/2020 * (Principal) S/P CABG x 3 ICD-10-CM: Z95.1 ICD-9-CM: V45.81  1/10/2020 Unknown S/P AVR ICD-10-CM: Z95.2 ICD-9-CM: V43.3  1/10/2020 Unknown Patient with slowly improving hypoxic post op respiratory failure. HIT + both on ROSA and SHARON. LE doppler with B lower extremity clot. Renal failure so unable to perform CT PE. Plan:  (Medical Decision Making)  
-will repeat cxr today. -to get dialysis today. If his dyspnea is related to volume this should help. -otherwise his O2 sats look good and could probably tolerate change to regular NC>  
-on duoneb, continue.  
-on argatroban. Change to oral anticoagulant when ok with surgery.  
 
 
Samra Echevarria MD

## 2020-01-31 NOTE — WOUND CARE
Left leg wound vac came off when patient was moving, assessed area to be well approximated however still draining a great deal, wound vac dressing applied, seal achieved. Will monitor.

## 2020-01-31 NOTE — PROGRESS NOTES
Today's Date: 1/31/2020 Date of Admission: 1/10/2020 Chart Reviewed. Subjective:  
 
Pt feels ok. He denies any dyspnea. He is sitting up in the chair. Medications Reviewed. Objective:  
 
Vitals:  
 01/31/20 1130 01/31/20 1200 01/31/20 1210 01/31/20 1220 BP: 120/52 107/55 144/67 Pulse: 73 75 80 Resp:      
Temp:      
SpO2:    95% Weight:      
Height:      
 
 
Intake and Output Current Shift: 01/31 0701 - 01/31 1900 In: -  
Out: 3800 Last 3 Shifts: 01/29 1901 - 01/31 0700 In: Nithin Landaverde [P.O.:1440; I.V.:455] Out: 60 [Urine:60] Physical Exam: 
General: Well Developed, Obese, No Acute Distress, Alert & Oriented x 3, Appropriate mood Neck: supple, no JVD Heart: S1S2 with RRR Lungs: Clear throughout auscultation bilaterally Abd: soft, nontender, nondistended, with good bowel sounds Ext: + edema bilaterally Sternal incision: clean, dry, and intact Skin: warm and dry LABS Data Review:  
Recent Labs  
  01/31/20 
0323 01/30/20 
4768 01/30/20 
8690 01/29/20 
4271 *  --  136 139  
K 4.4  --  4.3 4.4 MG 2.3  --   --  2.2 BUN 58*  --  37* 33* CREA 5.81*  --  4.54* 3.85* *  --  162* 142* WBC 12.0* 13.8*  --  15.8* HGB 8.1* 8.4*  --  8.8* HCT 26.3* 26.6*  --  28.3*  
* 117*  --  109* Estimated Creatinine Clearance: 15.6 mL/min (A) (based on SCr of 5.81 mg/dL (H)). Assessment/Plan:  
 
Principal Problem: S/P CABG x 3 (1/10/2020) Continued to improve, on Airvo, intermittent a-fib, on HD Active Problems: Aortic valve stenosis (1/10/2020) S/p AVR 
 
  CAD (coronary artery disease) (1/10/2020) S/p CABG Weaning from respirator [facility]) (1/10/2020) Acute hypoxemic respiratory failure ([facility] (1/10/2020) On Airvo S/P AVR (1/10/2020) Acute renal failure (ARF) ([address] (1/11/2020) On HD, nephrology following Atrial fibrillation ([facility] (1/13/2020) Intermittent, continue oral amiodarone, no BB with low BP at times, will transition to coumadin when platelets improved Shock ([address] (1/15/2020) 
resolved Bilateral pneumothorax (1/17/2020) 
resolved Thrombocytopenia ([address] (1/17/2020) Due to HIT 
 
  HIT (heparin-induced thrombocytopenia) ([address] (1/23/2020) On argatroban, per heme/onc plan to start coumadin when platelet count >[phone] Amie Decker PA-C

## 2020-01-31 NOTE — PROGRESS NOTES
Problem: Mobility Impaired (Adult and Pediatric) Goal: *Acute Goals and Plan of Care (Insert Text) Description STG: 
(1.)Patient will roll side to side in bed with  MAXIMAL ASSIST within 3-5 day(s). (2.)Patient will move from supine to sit and sit to supine  in bed with  MAXIMAL ASSIST within 3-5 day(s). (3.)Patient will sit edge of bed/chair with STAND BY ASSIST and good balance statically  for 10 minutes within 3-5 day(s). 4.  Patient will perform 1 sets of 10 repetitions of active range of motion exercises for bilateral lower extremity(s) with  cueing within 3-5 day(s). 5.  Patient will tolerate sitting with bed in chair position for 2 hour 2x/day to facilitate tolerance to sitting in chair within 3-5 days. LTG: 
(1.)Patient will move from supine to sit and sit to supine  and roll side to side in bed with  MODERATE ASSIST  within 7-10 day(s). (2.)Patient will transfer from bed to chair and chair to bed with  MAXIMAL ASSIST using the least restrictive device within 7-10 day(s). (3.)Patient will stand with  MINIMAL ASSIST for 2 minutes with the least restrictive device within 7-10 day(s). 4.  Patient will exhibit 3/5 strength B LE's to facilitate increased independence with bed mobility within 7-10 days. Outcome: Progressing Towards Goal 
  
PHYSICAL THERAPY: Daily Note and PM 1/31/2020 INPATIENT: PT Visit Days : 6 Payor: Trupti Alberts / Plan: Rosalie Ruiz / Product Type: [employer] /   
  
NAME/AGE/GENDER: Ibeth Courtney is a 69 y.o. male PRIMARY DIAGNOSIS: Atherosclerosis of native coronary artery of native heart with unstable angina pectoris ([facility] [I25.110] Nonrheumatic aortic valve stenosis [I35.0] CAD (coronary artery disease) [I25.10] Aortic valve stenosis [I35.0] S/P CABG x 3 S/P CABG x 3 Procedure(s) (LRB): 
CORONARY ARTERY BYPASS GRAFT WITH AVR/ (CABG X 3 )/ LIMA (N/A) VEIN HARVEST/ GREATER SAPHENOUS (Left) ESOPHAGEAL TRANS ECHOCARDIOGRAM (N/A) 21 Days Post-Op ICD-10: Treatment Diagnosis:  
 · Other abnormalities of gait and mobility (R26.89) Precaution/Allergies: 
Heparin; Amoxicillin; Keflex [cephalexin]; and Pcn [penicillins] ASSESSMENT:  
 
Mr. Alaina Mcnally underwent above surgery on 1/10/20 and was only recently extubated. He lives with his wife and ambulates independently with cane or RW at baseline. 1/31/20 pm: Pt seen in CVICU,  Supine in bed. Patient agreeable. RN at bedside. Patient on arivo. Patient is moved to sitting edge of bed with moderate to maximum assist x 2. Sitting balance needs support. With the use of the stand up lift the patient able to tolerate standing and weight bearing. Patient is transferred to the recliner and is positioned for comfort. Patient needed verbal cues to correct posture while standing in the lift and did much better with this today. Patient performed exercises while in the recliner. Patient is left with needs within reach. More  progress demonstrated today. PT to cont to follow for acute care needs. Will need extensive therapy at discharge. This section established at most recent assessment PROBLEM LIST (Impairments causing functional limitations): 1. Decreased Strength 2. Decreased ADL/Functional Activities 3. Decreased Transfer Abilities 4. Decreased Ambulation Ability/Technique 5. Decreased Balance 6. Decreased Activity Tolerance 7. Increased Fatigue 8. Increased Shortness of Breath 9. Decreased Knowledge of Precautions INTERVENTIONS PLANNED: (Benefits and precautions of physical therapy have been discussed with the patient.) 1. Balance Exercise 2. Bed Mobility 3. Neuromuscular Re-education/Strengthening 4. Therapeutic Activites 5. Therapeutic Exercise/Strengthening 6. Transfer Training 7. education TREATMENT PLAN: Frequency/Duration: twice daily for duration of hospital stay Rehabilitation Potential For Stated Goals: Good REHAB RECOMMENDATIONS (at time of discharge pending progress):   
Placement: It is my opinion, based on this patient's performance to date, that Mr. Clinton Smiley may benefit from intensive [facility] after discharge due to the functional deficits listed above that are likely to improve with skilled rehabilitation and severe debility. Equipment:  
? tbd  
? None at this time HISTORY:  
History of Present Injury/Illness (Reason for Referral): 
Patient admitted for above surgery. Past Medical History/Comorbidities:  
Mr. Clinton Smiley  has a past medical history of Arthritis, BPH (benign prostatic hyperplasia) (1/14/2013), CAD (coronary artery disease), DM type 2 (diabetes mellitus, type 2) ([facility] (dx 2004), Dyspnea, Gout (1/14/2013), HLD (hyperlipidemia) (1/14/2013), HTN (hypertension), Morbid obesity ([facility] (9/3/14), Psychiatric disorder, Rheumatic fever, Seasonal allergic rhinitis, Severe aortic valve stenosis, Thyroid disease, and Unspecified sleep apnea (2016). Mr. Clinton Smiley  has a past surgical history that includes hx tonsil and adenoidectomy; hx heart catheterization (12/23/2019); hx orthopaedic (Left); and hx cataract removal (Bilateral, 2012). Social History/Living Environment:  
Home Environment: Private residence One/Two Story Residence: One story Living Alone: No 
Support Systems: Spouse/Significant Other/Partner Patient Expects to be Discharged to[de-identified] Private residence Current DME Used/Available at Home: Cane, straight Prior Level of Function/Work/Activity: 
 
He lives with his wife and ambulates independently with cane or RW at baseline. Number of Personal Factors/Comorbidities that affect the Plan of Care: 3+: HIGH COMPLEXITY EXAMINATION:  
Most Recent Physical Functioning:  
Gross Assessment: 
  
         
  
Posture: 
  
Balance: 
Sitting: Impaired Sitting - Static: Fair (occasional) Sitting - Dynamic: Poor (constant support) Standing: Impaired(using the stand up lift) Bed Mobility: 
  
Wheelchair Mobility: 
  
Transfers: 
  
Gait: 
  
   
  
Body Structures Involved: 1. Heart 2. Lungs 3. Muscles Body Functions Affected: 1. Cardio 2. Respiratory 3. Neuromusculoskeletal 
4. Movement Related Activities and Participation Affected: 1. Mobility 2. Self Care 3. Domestic Life 4. Community, Social and [address] [place] [address] Number of elements that affect the Plan of Care: 4+: HIGH COMPLEXITY CLINICAL PRESENTATION:  
Presentation: Evolving clinical presentation with changing clinical characteristics: MODERATE COMPLEXITY CLINICAL DECISION MAKIN[address] [place] [address] AM-PAC 6 Clicks Basic Mobility Inpatient Short Form How much difficulty does the patient currently have. .. Unable A Lot A Little None 1. Turning over in bed (including adjusting bedclothes, sheets and blankets)? [x] 1   [] 2   [] 3   [] 4  
2. Sitting down on and standing up from a chair with arms ( e.g., wheelchair, bedside commode, etc.)   [x] 1   [] 2   [] 3   [] 4  
3. Moving from lying on back to sitting on the side of the bed? [x] 1   [] 2   [] 3   [] 4 How much help from another person does the patient currently need. .. Total A Lot A Little None 4. Moving to and from a bed to a chair (including a wheelchair)? [x] 1   [] 2   [] 3   [] 4  
5. Need to walk in hospital room? [x] 1   [] 2   [] 3   [] 4  
6. Climbing 3-5 steps with a railing? [x] 1   [] 2   [] 3   [] 4  
© , Trustees of [address] [place] [address], under license to [employer]. All rights reserved Score:  Initial: 6 Most Recent: X (Date: -- ) Interpretation of Tool:  Represents activities that are increasingly more difficult (i.e. Bed mobility, Transfers, Gait). Medical Necessity:    
· Patient is expected to demonstrate progress in  
· strength, range of motion, balance, coordination, functional technique, and activity tolerance ·  to  
· decrease assistance required with all functional mobility · . Reason for Services/Other Comments: 
· Patient continues to require skilled intervention due to · medical complications and patient unable to attend/participate in therapy as expected · . Use of outcome tool(s) and clinical judgement create a POC that gives a: Questionable prediction of patient's progress: MODERATE COMPLEXITY  
  
 
 
 
TREATMENT:  
(In addition to Assessment/Re-Assessment sessions the following treatments were rendered) Pre-treatment Symptoms/Complaints: \"Hello\" Pain: Initial:  
Pain Intensity 1: (no number given)  Post Session:  0/10 post session Therapeutic Activity: (    24 minutes): Therapeutic activities including Bed transfers, sit to stand transfers, static standing balance, posture ground to improve mobility, strength and balance. Required moderate cues for breathing while standing   to promote dynamic balance in standing. Therapeutic Exercise: (  minutes):  Exercises per grid below to improve mobility, strength, balance and coordination. Required minimum to moderate verbal cues to participate and perform correctly. Progressed resistance and repetitions as indicated. DATE: 1/26/20 1/29/20 Straight leg raise Hip abduct/ adduct X5 AAB Heel slides  X3 AB Hip external/ internal rotation Ankle dorsiflexion/ plantarflexion X5 AB 25 Sitting unsupported x5'       
LAQ  25      
marching  25 Key:  A=active, AA=active assisted, P=passive, B=bilaterally, R=right, L=left Braces/Orthotics/Lines/Etc:  
· IV 
· brown catheter · Wound vac · O2 Device: Heated, Hi flow nasal cannula Treatment/Session Assessment:   
· Response to Treatment:  Fatigued after treatment, but did better with vitals · Interdisciplinary Collaboration:  
o Physical Therapy Assistant 
o Registered Nurse · After treatment position/precautions:  
o Up in chair o Bed/Chair-wheels locked 
o Call light within reach 
o RN notified · Compliance with Program/Exercises: Will assess as treatment progresses · Recommendations/Intent for next treatment session: \"Next visit will focus on advancements to more challenging activities and reduction in assistance provided\". Total Treatment Duration: PT Patient Time In/Time Out Time In: 3129 Time Out: 1351 Victor Manuel Sellers, PTA

## 2020-01-31 NOTE — PROGRESS NOTES
PT note:  Treatment deferred as the patient is currently on HD. Will return for pm treatment.   Kailey Morrissey, PTA

## 2020-01-31 NOTE — PROGRESS NOTES
[facility] PROGRESS NOTE Follow up for: 
 
Subjective:  
Patient seen and examined at bedside. ROS: 
Gen - no fever, no chills CV - no chest pain, no orthopnea Lung - + shortness of breath, no cough Abd - no tenderness, no nausea, no vomiting Ext - + edema Objective:  
Exam: 
Vitals:  
 01/31/20 0730 01/31/20 0820 01/31/20 0830 01/31/20 0900 BP: 130/62 132/67 125/60 104/59 Pulse: 76 67 68 74 Resp: 29   (!) 43 Temp:      
SpO2: 100%   96% Weight:      
Height:      
 
 
 
Intake/Output Summary (Last 24 hours) at 1/31/2020 0913 Last data filed at 1/31/2020 3360 Gross per 24 hour Intake 1341.62 ml Output 20 ml Net 1321.62 ml Current Facility-Administered Medications Medication Dose Route Frequency  albuterol-ipratropium (DUO-NEB) 2.5 MG-0.5 MG/3 ML  3 mL Nebulization QID RT  
 amiodarone (CORDARONE) tablet 400 mg  400 mg Oral Q12H  
 famotidine (PEPCID) tablet 20 mg  20 mg Oral DAILY  nitroglycerin (NITROBID) 2 % ointment 1 Inch  1 Inch Topical TID  insulin regular (NOVOLIN R, HUMULIN R) injection 0-15 Units  0-15 Units SubCUTAneous AC&HS  ondansetron (ZOFRAN) injection 4 mg  4 mg IntraVENous Q6H PRN  
 busPIRone (BUSPAR) tablet 10 mg  10 mg Oral TID  morphine 10 mg/ml injection 5 mg  5 mg IntraVENous Q4H PRN  
 oxyCODONE-acetaminophen (PERCOCET) 5-325 mg per tablet 1 Tab  1 Tab Per NG tube Q4H PRN  
 amiodarone (CORDARONE) 450 mg in dextrose 5% 250 mL infusion  0.5-1 mg/min IntraVENous TITRATE  traMADol (ULTRAM) tablet 50 mg  50 mg Oral Q6H PRN  
 epoetin maritza-epbx (RETACRIT) 14,000 Units combo injection  14,000 Units SubCUTAneous Q7D  
 argatroban 50 mg in 0.9% sodium chloride 50 mL (1000 mcg/mL) infusion  0.5-10 mcg/kg/min IntraVENous TITRATE  alcohol 62% (NOZIN) nasal  1 Ampule  1 Ampule Topical Q12H  
 NUTRITIONAL SUPPORT ELECTROLYTE PRN ORDERS   Does Not Apply PRN  
  acetaminophen (TYLENOL) solution 650 mg  650 mg Per NG tube Q4H PRN  
 sodium chloride (NS) flush 5-40 mL  5-40 mL IntraVENous Q8H  
 sodium chloride (NS) flush 5-40 mL  5-40 mL IntraVENous PRN  
 naloxone (NARCAN) injection 0.4 mg  0.4 mg IntraVENous PRN  
 [Held by provider] metoprolol tartrate (LOPRESSOR) tablet 25 mg  25 mg Oral Q12H  
 atorvastatin (LIPITOR) tablet 80 mg  80 mg Oral QHS  dextrose (D50W) injection syrg 12.5 g  25 mL IntraVENous PRN  
 [Held by provider] aspirin chewable tablet 81 mg  81 mg Oral DAILY  magnesium sulfate 1 g/100 ml IVPB (premix or compounded)  1 g IntraVENous PRN  
 midazolam (VERSED) injection 1 mg  1 mg IntraVENous Q1H PRN  
 sodium bicarbonate (8.4%) injection 50 mEq  50 mEq IntraVENous PRN  
 
 
EXAM 
GEN - Alert and awake CV - S1, S2, RRR, no rub, murmur, or gallop Lung - clear anteriorly, diminished at bases Abd - soft, nontender, BS present Ext - 2+ edema Recent Labs  
  01/31/20 
0323 01/30/20 
6005 01/29/20 
3437 WBC 12.0* 13.8* 15.8* HGB 8.1* 8.4* 8.8* HCT 26.3* 26.6* 28.3*  
* 117* 109* Recent Labs  
  01/31/20 
0323 01/30/20 
8045 01/29/20 
9107 * 136 139  
K 4.4 4.3 4.4 CL 99 100 103 CO2 28 29 31 BUN 58* 37* 33* CREA 5.81* 4.54* 3.85* CA 8.0* 8.3 8.2*  
* 162* 142* MG 2.3  --  2.2 PHOS 5.6*  --  3.8* Assessment and Plan:  
1) DEJUAN on CKD- Remains anuric Seen on HD , no complication 2) HTN/Volume-   
 
3) Anemia-  Monitoring. 4) Respiratory failure- better 5) S/p CABG Lloyd Montano MD

## 2020-01-31 NOTE — DIALYSIS
Consent verified for renal replacement therapy. HD initiated using RTPC 4K BATH. /67 P 67  Machine settings per MD order. Pt caox4. . Will monitor during treatment.

## 2020-02-01 NOTE — CONSULTS
Hospitalist Consult Note Admit Date:  1/10/2020  4:59 AM  
Name:  Ruba Richardson Age:  69 y.o. 
:  1945 MRN:  [id number] PCP:  Ean Calderon MD 
Treatment Team: Attending Provider: Alessia Morrow MD; Consulting Provider: Julieth Cheema MD; Consulting Provider: Aishwarya Enamorado MD; Care Manager: Bre Workman; Consulting Provider: Lorin Briseno MD; Utilization Review: Mamie Eid RN; Utilization Review: Guanakito Moreno RN; Consulting Provider: Santos White MD; Consulting Provider: Jun Godoy MD 
Diabetes management HPI:  
60-year-old  male patient, status post CABG and status post AVR, acute hypoxic respiratory failure presently on Airvo, acute kidney injury presently on dialysis, history of A. 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 Hospital service was consulted for diabetes management. Of the 10 review of symptoms apart from patient complaining of mild shortness of breath, better on able, mild pain bilateral toes, patient other review review of systems were negative or noncontributory. Hemoglobin 8.1, glucose of 136, creatinine of 5.14. 
 
 
 
10 systems reviewed and negative except as noted in HPI. Past Medical History:  
Diagnosis Date  Arthritis  BPH (benign prostatic hyperplasia) 2013  CAD (coronary artery disease)  DM type 2 (diabetes mellitus, type 2) ([facility] [address] dx  Type 2, avg fbs , recognizes hypo at 66, last HA1C was 9.2 on 2014  Dyspnea   
 at times, Uses Albuterol inhaler when needed (last used first of aug 2014)  Gout 2013  HLD (hyperlipidemia) 2013  
 HTN (hypertension)   
 controlled with meds  Morbid obesity ([facility] [address] 9/3/14 BMI 41.7  Psychiatric disorder   
 anxiety, controlled with buspar  Rheumatic fever   
 as a child  Seasonal allergic rhinitis   
 treated with Allegra  Severe aortic valve stenosis   
 echo 09/11/19 EF 60-65%- mild to moderate regurgitation  Thyroid disease   
 hx- no longer takes synthroid  Unspecified sleep apnea 2016  
 bi pap Past Surgical History:  
Procedure Laterality Date  HX CATARACT REMOVAL Bilateral 2012  HX HEART CATHETERIZATION  12/23/2019  HX ORTHOPAEDIC Left   
 carpal tunnel  HX TONSIL AND ADENOIDECTOMY Current Facility-Administered Medications Medication Dose Route Frequency  insulin lispro (HUMALOG) injection   SubCUTAneous AC&HS  
 albuterol-ipratropium (DUO-NEB) 2.5 MG-0.5 MG/3 ML  3 mL Nebulization Q4H PRN  
 insulin glargine (LANTUS) injection 10 Units  10 Units SubCUTAneous QHS  albuterol-ipratropium (DUO-NEB) 2.5 MG-0.5 MG/3 ML  3 mL Nebulization QID RT  
 amiodarone (CORDARONE) tablet 400 mg  400 mg Oral Q12H  
 famotidine (PEPCID) tablet 20 mg  20 mg Oral DAILY  nitroglycerin (NITROBID) 2 % ointment 1 Inch  1 Inch Topical TID  ondansetron (ZOFRAN) injection 4 mg  4 mg IntraVENous Q6H PRN  
 busPIRone (BUSPAR) tablet 10 mg  10 mg Oral TID  morphine 10 mg/ml injection 5 mg  5 mg IntraVENous Q4H PRN  
 oxyCODONE-acetaminophen (PERCOCET) 5-325 mg per tablet 1 Tab  1 Tab Per NG tube Q4H PRN  
 traMADol (ULTRAM) tablet 50 mg  50 mg Oral Q6H PRN  
 epoetin maritza-epbx (RETACRIT) 14,000 Units combo injection  14,000 Units SubCUTAneous Q7D  
 argatroban 50 mg in 0.9% sodium chloride 50 mL (1000 mcg/mL) infusion  0.5-10 mcg/kg/min IntraVENous TITRATE  alcohol 62% (NOZIN) nasal  1 Ampule  1 Ampule Topical Q12H  
 NUTRITIONAL SUPPORT ELECTROLYTE PRN ORDERS   Does Not Apply PRN  
 acetaminophen (TYLENOL) solution 650 mg  650 mg Per NG tube Q4H PRN  
 sodium chloride (NS) flush 5-40 mL  5-40 mL IntraVENous Q8H  
  sodium chloride (NS) flush 5-40 mL  5-40 mL IntraVENous PRN  
 naloxone (NARCAN) injection 0.4 mg  0.4 mg IntraVENous PRN  
 [Held by provider] metoprolol tartrate (LOPRESSOR) tablet 25 mg  25 mg Oral Q12H  
 atorvastatin (LIPITOR) tablet 80 mg  80 mg Oral QHS  dextrose (D50W) injection syrg 12.5 g  25 mL IntraVENous PRN  
 [Held by provider] aspirin chewable tablet 81 mg  81 mg Oral DAILY  magnesium sulfate 1 g/100 ml IVPB (premix or compounded)  1 g IntraVENous PRN  
 midazolam (VERSED) injection 1 mg  1 mg IntraVENous Q1H PRN  
 sodium bicarbonate (8.4%) injection 50 mEq  50 mEq IntraVENous PRN Allergies Allergen Reactions  Heparin Other (comments) HIT antibody test strongly positive on 1/17/2020. SHARON drawn 1/18/2020 and resulted positive on 1/21/2020  Amoxicillin Rash  Keflex [Cephalexin] Rash  Pcn [Penicillins] Other (comments) \"felt closed in\". No rash Social History Tobacco Use  Smoking status: Never Smoker  Smokeless tobacco: Never Used Substance Use Topics  Alcohol use: Yes Alcohol/week: 0.0 standard drinks Comment: rarely Family History Problem Relation Age of Onset  Lung Disease Mother   
     copd  Cancer Father   
     lympnode cancer  No Known Problems Brother  Cancer Other   
     fmh lymphoma  Diabetes Other   
     fmhx  Diabetes Maternal Grandfather Immunization History Administered Date(s) Administered  Influenza High Dose Vaccine PF 09/20/2017, 09/20/2018, 10/02/2019  Influenza Vaccine 07/01/2012, 10/18/2013, 09/22/2014, 09/25/2015, 10/03/2016  Pneumococcal Conjugate (PCV-13) 09/25/2015  Pneumococcal Polysaccharide (PPSV-23) 10/03/2016  TB Skin Test (PPD) Intradermal 01/10/2020  Td, Adsorbed PF 02/17/2016  Zoster Recombinant 10/02/2019  Zoster Vaccine, Live 02/18/2016 Objective:  
 
Patient Vitals for the past 24 hrs: 
 Temp Pulse Resp BP SpO2 02/01/20 1708  73 23 127/56 100 % 02/01/20 1645  71  139/64   
02/01/20 1629  76 (!) 45 139/64 100 % 02/01/20 1615  70  139/64 100 % 02/01/20 1558  70 19 131/64 100 % 02/01/20 1553     97 % 02/01/20 1545  75  131/64   
02/01/20 1535  74 26 134/61 100 % 02/01/20 1515    134/61   
02/01/20 1454  76 25 120/58 100 % 02/01/20 1445    120/58   
02/01/20 1415    109/50   
02/01/20 1413     98 % 02/01/20 1407  75 22 109/50 100 % 02/01/20 1353  74 24 129/60   
02/01/20 1228  79 28 129/60 98 % 02/01/20 1115 98.4 °F (36.9 °C) 79 18 114/59 99 % 02/01/20 1106     98 % 02/01/20 0753     93 % 02/01/20 0659 98.6 °F (37 °C) 68 23 119/56 97 % 02/01/20 0630  69 (!) 80 123/54 98 % 02/01/20 0600  71 (!) 42 125/60 98 % 02/01/20 0529  67 17 126/60 95 % 02/01/20 0459  67 17 137/63 95 % 02/01/20 0429  70 18 121/58 95 % 02/01/20 0401     94 % 02/01/20 0359  67 16 118/57 94 % 02/01/20 0329  68 23 122/59 95 % 02/01/20 0300 98.5 °F (36.9 °C) 68 11 126/58 98 % 02/01/20 0229  69 29 101/56 98 % 02/01/20 0159  71 19 102/60 98 % 02/01/20 0129  69 13 99/57 99 % 02/01/20 0059  74 24 114/58 99 % 02/01/20 0030  81 (!) 40 114/55 97 % 01/31/20 2359  71 18 113/56 99 % 01/31/20 2329  75 22 115/55 100 % 01/31/20 2259 98.3 °F (36.8 °C) 73 21 113/53 99 % 01/31/20 2229  80 (!) 35 119/56 97 % 01/31/20 2159  76 19 124/58 98 % 01/31/20 2130  79 21 139/61 98 % 01/31/20 2100  85 (!) 45 105/55 92 % 01/31/20 2029  84 (!) 45 118/62 97 % 01/31/20 1959  77 28 111/54 96 % 01/31/20 1900 98.4 °F (36.9 °C) 78 29 125/54 98 % 01/31/20 1759  72 21 116/56 100 % Oxygen Therapy O2 Sat (%): 100 % (02/01/20 1708) Pulse via Oximetry: 73 beats per minute (02/01/20 1708) O2 Device: Heated; Hi flow nasal cannula (02/01/20 1553) O2 Flow Rate (L/min): 40 l/min (02/01/20 1553) O2 Temperature: 87.8 °F (31 °C) (02/01/20 0659) FIO2 (%): 50 % (02/01/20 1553) Intake/Output Summary (Last 24 hours) at 2/1/2020 1733 Last data filed at 2/1/2020 1708 Gross per 24 hour Intake 1898.32 ml Output 4100 ml Net -2201.68 ml Physical Exam: 
General:    Well nourished. Alert. On Airvo Eyes:   Normal sclera. Extraocular movements intact. ENT:  Normocephalic, atraumatic. Moist mucous membranes CV:   RRR. No murmur, rub, or gallop. Lungs:  Coarse breath sounds Abdomen: Soft, nontender, nondistended. Bowel sounds normal.  Morbidly obese Extremities: Warm and dry. Toes of bilateral feet dusky in color, normal sensation. Mild petechial lesion to few fingers palmar aspect. Neurologic: CN II-XII grossly intact. Sensation intact. Skin:     Wound VAC left leg Psych:  Normal mood and affect. I reviewed the labs Data Review:  
Recent Results (from the past 24 hour(s)) GLUCOSE, POC Collection Time: 01/31/20  9:34 PM  
Result Value Ref Range Glucose (POC) 323 (H) 65 - 100 mg/dL METABOLIC PANEL, BASIC Collection Time: 02/01/20  3:12 AM  
Result Value Ref Range Sodium 138 136 - 145 mmol/L Potassium 4.4 3.5 - 5.1 mmol/L Chloride 102 98 - 107 mmol/L  
 CO2 29 21 - 32 mmol/L Anion gap 7 7 - 16 mmol/L Glucose 136 (H) 65 - 100 mg/dL BUN 49 (H) 8 - 23 MG/DL Creatinine 5.14 (H) 0.8 - 1.5 MG/DL  
 GFR est AA 14 (L) >60 ml/min/1.73m2 GFR est non-AA 12 (L) >60 ml/min/1.73m2 Calcium 8.1 (L) 8.3 - 10.4 MG/DL  
PTT Collection Time: 02/01/20  3:12 AM  
Result Value Ref Range aPTT 67.9 (H) 24.7 - 39.8 SEC  
CBC W/O DIFF Collection Time: 02/01/20  3:12 AM  
Result Value Ref Range WBC 11.7 (H) 4.3 - 11.1 K/uL  
 RBC 2.71 (L) 4.23 - 5.6 M/uL HGB 8.1 (L) 13.6 - 17.2 g/dL HCT 26.4 (L) 41.1 - 50.3 % MCV 97.4 79.6 - 97.8 FL  
 MCH 29.9 26.1 - 32.9 PG  
 MCHC 30.7 (L) 31.4 - 35.0 g/dL  
 RDW 15.2 (H) 11.9 - 14.6 % PLATELET 409 (L) 636 - 450 K/uL  MPV 11.4 9.4 - 12.3 FL  
 ABSOLUTE NRBC 0.00 0.0 - 0.2 K/uL GLUCOSE, POC Collection Time: 02/01/20  6:42 AM  
Result Value Ref Range Glucose (POC) 158 (H) 65 - 100 mg/dL GLUCOSE, POC Collection Time: 02/01/20 11:18 AM  
Result Value Ref Range Glucose (POC) 249 (H) 65 - 100 mg/dL GLUCOSE, POC Collection Time: 02/01/20  4:36 PM  
Result Value Ref Range Glucose (POC) 153 (H) 65 - 100 mg/dL All Micro Results Procedure Component Value Units Date/Time CULTURE, BLOOD [[id number]] Collected:  01/14/20 1005 Order Status:  Completed Specimen:  Blood Updated:  01/19/20 0701 Special Requests: --     
  RIGHT 
ARTL (ART LINE) Culture result: NO GROWTH 5 DAYS     
 CULTURE, BLOOD [[id number]] Collected:  01/14/20 1145 Order Status:  Completed Specimen:  Blood Updated:  01/19/20 0701 Special Requests: --     
  LEFT 
HAND Culture result: NO GROWTH 5 DAYS     
 CULTURE, BLOOD [[id number]] Collected:  01/12/20 4876 Order Status:  Completed Specimen:  Blood Updated:  01/17/20 0740 Special Requests: --     
  RIGHT Antecubital 
  
  Culture result: NO GROWTH 5 DAYS     
 CULTURE, BLOOD [[id number]] Collected:  01/12/20 1347 Order Status:  Completed Specimen:  Blood Updated:  01/17/20 0740 Special Requests: --     
  LEFT 
HAND Culture result: NO GROWTH 5 DAYS     
 CULTURE, URINE [[id number]] Collected:  01/14/20 1950 Order Status:  Completed Specimen:  Urine from [place] Specimen Updated:  01/17/20 0706 Special Requests: NO SPECIAL REQUESTS Culture result: NO GROWTH 2 DAYS     
 CULTURE, RESPIRATORY/SPUTUM/BRONCH [address] [[id number]] Collected:  01/14/20 1132 Order Status:  Completed Specimen:  Sputum,ET Suction Updated:  01/16/20 3570   Special Requests: NO SPECIAL REQUESTS     
  GRAM STAIN 15 TO 20 WBCS SEEN PER OIF  
   0 TO 1 EPITHELIAL CELLS SEEN PER OIF  
   1+ MUCUS PRESENT     
   FEW GRAM POSITIVE RODS     
   FEW GRAM POSITIVE COCCI     
 Culture result:    
  LIGHT NORMAL RESPIRATORY BRENNAN  
     
 CULTURE, RESPIRATORY/SPUTUM/BRONCH [address] [[id number]] Collected:  01/12/20 5938 Order Status:  Completed Specimen:  Sputum from Endotracheal aspirate Updated:  01/14/20 2247 Special Requests: NO SPECIAL REQUESTS     
  GRAM STAIN 15 TO 20 WBCS SEEN PER OIF  
   0 TO 1 EPITHELIAL CELLS SEEN PER OIF  
   2+ MUCUS PRESENT     
   RARE GRAM POSITIVE COCCI Culture result:    
  LIGHT NORMAL RESPIRATORY BRENNAN  
     
 CULTURE, URINE [[id number]] Collected:  01/12/20 0901 Order Status:  Completed Specimen:  Urine from [place] Specimen Updated:  01/14/20 0725 Special Requests: NO SPECIAL REQUESTS Culture result: NO GROWTH 2 DAYS Other Studies: Xr Chest Pa Lat Result Date: 1/8/2020 Clinical History: The patient is a 69years year old Male presenting for pre-op CABG. Comparison:  none Findings:  Frontal and lateral views of the chest were obtained. Lungs are clear without focal infiltrate or consolidation. No pleural effusions are seen. There are mild underlying changes of COPD. The cardiomediastinal silhouette is within normal limits. There are no acute osseous abnormalities. Previous upper lumbar vertebral augmentation has been performed. Impression:  No acute cardiopulmonary process. CPT code(s) 74579 Duplex Carotid Bilateral 
 
Result Date: 1/8/2020 History: Preop for CABG/AVR Sonographic evaluation of the carotid arteries was performed bilaterally. PSV and EDV criteria utilized for carotid artery stenosis measurements Grayscale imaging on the right demonstrates mild plaque disease at the carotid bulb. The velocities and ratios are unremarkable. The right vertebral artery demonstrates antegrade flow without evidence of steal. Grayscale imaging on the left demonstrates mild to moderate plaque disease at the carotid bulb.  Osseous are elevated in the internal carotid artery measuring 947 cm/s systolic. Ratio of ICA to CCA is normal at 1.6. The left vertebral artery demonstrates antegrade flow without evidence of steal.  
 
Impression: 1. Less than 50% stenosis of the right internal carotid artery. 2. 50-69 % stenosis of the left internal carotid artery. Assessment and Plan:  
 
Hospital Problems as of 2/1/2020 Date Reviewed: 12/6/2019 Codes Class Noted - Resolved POA  
 HIT (heparin-induced thrombocytopenia) ([facility]) ICD-10-CM: L69.30 ICD-9-CM: 289.84  1/23/2020 - Present Unknown Bilateral pneumothorax ICD-10-CM: J93.9 ICD-9-CM: 512.89  1/17/2020 - Present Unknown Thrombocytopenia ([facility] [address] ICD-10-CM: D69.6 ICD-9-CM: 287.5  1/17/2020 - Present Unknown Shock ([facility] [address] ICD-10-CM: R57.9 ICD-9-CM: 785.50  1/15/2020 - Present Unknown Atrial fibrillation [facility]) ICD-10-CM: I48.91 
ICD-9-CM: 427.31  1/13/2020 - Present Unknown Acute renal failure (ARF) ([facility]) ICD-10-CM: N17.9 ICD-9-CM: 584.9  1/11/2020 - Present Unknown Aortic valve stenosis ICD-10-CM: I35.0 ICD-9-CM: 424.1  1/10/2020 - Present Unknown CAD (coronary artery disease) ICD-10-CM: I25.10 ICD-9-CM: 414.00  1/10/2020 - Present Unknown Weaning from respirator [facility]) ICD-10-CM: Z99.11 ICD-9-CM: V46.13  1/10/2020 - Present Unknown Acute hypoxemic respiratory failure ([facility] [address] ICD-10-CM: J96.01 
ICD-9-CM: 518.81  1/10/2020 - Present * (Principal) S/P CABG x 3 ICD-10-CM: Z95.1 ICD-9-CM: V45.81  1/10/2020 - Present Unknown S/P AVR ICD-10-CM: Z95.2 ICD-9-CM: V43.3  1/10/2020 - Present Unknown DM type 2 (diabetes mellitus, type 2) ([facility]) ICD-10-CM: E11.9 ICD-9-CM: 250.00  1/14/2013 - Present Yes RESOLVED: Metabolic acidosis Z-13-FZ: E87.2 ICD-9-CM: 276.2  1/11/2020 - 1/16/2020 Unknown RESOLVED: Hyperkalemia ICD-10-CM: E87.5 ICD-9-CM: 276.7  1/11/2020 - 1/16/2020 Unknown PLAN: 
 -Diabetes mellitus type 2-continue sliding scale, will add small dose of Lantus at bedtime 
-Acute hypoxic respiratory failure-on Airvo, pulmonary following HIT-on argatroban 
-Status post CABG and AVR 
-Ischemic toes- 
-DEJUAN presently on dialysis 
-Wound VAC to left leg [facility], full code. Will follow.  
 
Signed: 
Poncho Smith MD

## 2020-02-01 NOTE — DIALYSIS
Hemodialysis treatment completed without complications. Patient responds to voice and VS stable  /56  P 71   
 
 4 Kgs removed. Flushed both ports with 10 mL of NS.  CVC dressing clean, dry, and intact, tego caps intact, curos applied. Report given to primary RN.

## 2020-02-01 NOTE — PROGRESS NOTES
[facility] PROGRESS NOTE Follow up for: 
 
Subjective:  
Patient seen and examined at bedside. ROS: 
Gen - no fever, no chills CV - no chest pain, no orthopnea Lung - + shortness of breath, no cough Abd - no tenderness, no nausea, no vomiting Ext - + edema Objective:  
Exam: 
Vitals:  
 02/01/20 0600 02/01/20 0630 02/01/20 7527 02/01/20 6809 BP: 125/60 123/54 119/56 Pulse: 71 69 68 Resp: (!) 42 (!) 80 23 Temp:   98.6 °F (37 °C) SpO2: 98% 98% 97% 93% Weight:      
Height:      
 
 
 
Intake/Output Summary (Last 24 hours) at 2/1/2020 0940 Last data filed at 2/1/2020 6707 Gross per 24 hour Intake 1372.8 ml Output 3900 ml Net -2527.2 ml  
 
 
Current Facility-Administered Medications Medication Dose Route Frequency  albuterol-ipratropium (DUO-NEB) 2.5 MG-0.5 MG/3 ML  3 mL Nebulization QID RT  
 amiodarone (CORDARONE) tablet 400 mg  400 mg Oral Q12H  
 famotidine (PEPCID) tablet 20 mg  20 mg Oral DAILY  nitroglycerin (NITROBID) 2 % ointment 1 Inch  1 Inch Topical TID  insulin regular (NOVOLIN R, HUMULIN R) injection 0-15 Units  0-15 Units SubCUTAneous AC&HS  ondansetron (ZOFRAN) injection 4 mg  4 mg IntraVENous Q6H PRN  
 busPIRone (BUSPAR) tablet 10 mg  10 mg Oral TID  morphine 10 mg/ml injection 5 mg  5 mg IntraVENous Q4H PRN  
 oxyCODONE-acetaminophen (PERCOCET) 5-325 mg per tablet 1 Tab  1 Tab Per NG tube Q4H PRN  
 amiodarone (CORDARONE) 450 mg in dextrose 5% 250 mL infusion  0.5-1 mg/min IntraVENous TITRATE  traMADol (ULTRAM) tablet 50 mg  50 mg Oral Q6H PRN  
 epoetin maritza-epbx (RETACRIT) 14,000 Units combo injection  14,000 Units SubCUTAneous Q7D  
 argatroban 50 mg in 0.9% sodium chloride 50 mL (1000 mcg/mL) infusion  0.5-10 mcg/kg/min IntraVENous TITRATE  alcohol 62% (NOZIN) nasal  1 Ampule  1 Ampule Topical Q12H  
 NUTRITIONAL SUPPORT ELECTROLYTE PRN ORDERS   Does Not Apply PRN  
  acetaminophen (TYLENOL) solution 650 mg  650 mg Per NG tube Q4H PRN  
 sodium chloride (NS) flush 5-40 mL  5-40 mL IntraVENous Q8H  
 sodium chloride (NS) flush 5-40 mL  5-40 mL IntraVENous PRN  
 naloxone (NARCAN) injection 0.4 mg  0.4 mg IntraVENous PRN  
 [Held by provider] metoprolol tartrate (LOPRESSOR) tablet 25 mg  25 mg Oral Q12H  
 atorvastatin (LIPITOR) tablet 80 mg  80 mg Oral QHS  dextrose (D50W) injection syrg 12.5 g  25 mL IntraVENous PRN  
 [Held by provider] aspirin chewable tablet 81 mg  81 mg Oral DAILY  magnesium sulfate 1 g/100 ml IVPB (premix or compounded)  1 g IntraVENous PRN  
 midazolam (VERSED) injection 1 mg  1 mg IntraVENous Q1H PRN  
 sodium bicarbonate (8.4%) injection 50 mEq  50 mEq IntraVENous PRN  
 
 
EXAM 
GEN - Alert and awake CV - S1, S2, RRR, no rub, murmur, or gallop Lung - clear anteriorly, diminished at bases Abd - soft, nontender, BS present Ext - 2+ edema Recent Labs 02/01/20 
0688 01/31/20 
4018 01/30/20 
7878 WBC 11.7* 12.0* 13.8* HGB 8.1* 8.1* 8.4* HCT 26.4* 26.3* 26.6*  
* 130* 117* Recent Labs 02/01/20 
6078 01/31/20 
4428 01/30/20 
9861  135* 136  
K 4.4 4.4 4.3  99 100 CO2 29 28 29 BUN 49* 58* 37* CREA 5.14* 5.81* 4.54* CA 8.1* 8.0* 8.3 * 180* 162* MG  --  2.3  --   
PHOS  --  5.6*  --   
 
 
Assessment and Plan:  
1) DEJUAN on CKD- Remains anuric HD today 2) HTN/Volume-   
 
3) Anemia-  
 
4) Respiratory failure- better 5) S/p CABG Philip Kayser, MD

## 2020-02-01 NOTE — PROGRESS NOTES
REVISED GOALS and ONGOING 2/1/2020 all goals  : 
 
STG: 
(1.)Patient will roll side to side in bed with  MODERATE ASSIST within 3-5 day(s). (2.)Patient will move from supine to sit and sit to supine  in bed with  MODERATE ASSIST within 3-5 day(s). (3.)Patient will sit edge of bed/chair with STAND BY ASSIST and good balance statically  for 10 minutes within 3-5 day(s). 4.  Patient will perform 1 sets of 10 repetitions of active range of motion exercises for bilateral lower extremity(s) with  cueing within 3-5 day(s). 5.  Patient will tolerate sitting with bed in chair position for 2 hour 2x/day to facilitate tolerance to sitting in chair within 3-5 days. LTG: 
(1.)Patient will move from supine to sit and sit to supine  and roll side to side in bed with  MIN ASSIST  within 7-10 day(s). (2.)Patient will transfer from bed to chair and chair to bed with  MODERATE ASSIST using the least restrictive device within 7-10 day(s). (3.)Patient will stand with  MINIMAL ASSIST x 2 for 2 minutes with the least restrictive device within 7-10 day(s). 4.  Patient will exhibit 3/5 strength B LE's to facilitate increased independence with bed mobility within 7-10 days. PHYSICAL THERAPY: Re-evaluation and PM 2/1/2020 INPATIENT: PT Visit Days : 1(reassessment) Payor: Trupti Orellanaers / Plan: Rosalie Sara / Product Type: [employer] /   
  
NAME/AGE/GENDER: Ibeth Courtney is a 69 y.o. male PRIMARY DIAGNOSIS: Atherosclerosis of native coronary artery of native heart with unstable angina pectoris ([facility] [address] [I25.110] Nonrheumatic aortic valve stenosis [I35.0] CAD (coronary artery disease) [I25.10] Aortic valve stenosis [I35.0] S/P CABG x 3 S/P CABG x 3 Procedure(s) (LRB): 
CORONARY ARTERY BYPASS GRAFT WITH AVR/ (CABG X 3 )/ LIMA (N/A) VEIN HARVEST/ GREATER SAPHENOUS (Left) ESOPHAGEAL TRANS ECHOCARDIOGRAM (N/A) 22 Days Post-Op ICD-10: Treatment Diagnosis: · Other abnormalities of gait and mobility (R26.89) Precaution/Allergies: 
Heparin; Amoxicillin; Keflex [cephalexin]; and Pcn [penicillins] ASSESSMENT:  
 
Mr. Hebert Miller underwent above surgery on 1/10/20 and was only recently extubated. He lives with his wife and ambulates independently with cane or RW at baseline. 2/1/20 pm: Pt seen in CVICU,  Supine in bed on airvo. Patient agreeable to mobilization, is rather impulsive and restless. RN team at bedside. Patient is moved to sitting edge of bed with moderate to maximum assist x 2. Sitting balance needs support. With the use of the stand up lift, JOHNNY II, the patient able to tolerate standing and weight bearing. Patient is transferred to the recliner and is positioned for comfort. Patient needed verbal cues to correct posture while standing in the lift and did much better with this today. Patient is left with needs within reach. Once per day today secondary to going to dialysis this afternoon. Reassessment today. PT to cont to follow for acute care needs. Will need extensive cardiac rehabilitation at discharge. This section established at most recent assessment PROBLEM LIST (Impairments causing functional limitations): 1. Decreased Strength 2. Decreased ADL/Functional Activities 3. Decreased Transfer Abilities 4. Decreased Ambulation Ability/Technique 5. Decreased Balance 6. Decreased Activity Tolerance 7. Increased Fatigue 8. Increased Shortness of Breath 9. Decreased Knowledge of Precautions INTERVENTIONS PLANNED: (Benefits and precautions of physical therapy have been discussed with the patient.) 1. Balance Exercise 2. Bed Mobility 3. Neuromuscular Re-education/Strengthening 4. Therapeutic Activites 5. Therapeutic Exercise/Strengthening 6. Transfer Training 7. education TREATMENT PLAN: Frequency/Duration: twice daily for duration of hospital stay Rehabilitation Potential For Stated Goals: Good REHAB RECOMMENDATIONS (at time of discharge pending progress):   
Placement: It is my opinion, based on this patient's performance to date, that Mr. Manjit Rodriguez may benefit from intensive therapy at a [facility] after discharge due to the functional deficits listed above that are likely to improve with skilled rehabilitation and severe debility. Equipment:  
? tbd  
? None at this time HISTORY:  
History of Present Injury/Illness (Reason for Referral): 
Patient admitted for above surgery. Past Medical History/Comorbidities:  
Mr. Manjit Rodriguez  has a past medical history of Arthritis, BPH (benign prostatic hyperplasia) (1/14/2013), CAD (coronary artery disease), DM type 2 (diabetes mellitus, type 2) ([facility] [address] (dx 2004), Dyspnea, Gout (1/14/2013), HLD (hyperlipidemia) (1/14/2013), HTN (hypertension), Morbid obesity ([facility] [address] (9/3/14), Psychiatric disorder, Rheumatic fever, Seasonal allergic rhinitis, Severe aortic valve stenosis, Thyroid disease, and Unspecified sleep apnea (2016). Mr. Manjit Rodriguez  has a past surgical history that includes hx tonsil and adenoidectomy; hx heart catheterization (12/23/2019); hx orthopaedic (Left); and hx cataract removal (Bilateral, 2012). Social History/Living Environment:  
Home Environment: Private residence One/Two Story Residence: One story Living Alone: No 
Support Systems: Spouse/Significant Other/Partner Patient Expects to be Discharged to[de-identified] Private residence Current DME Used/Available at Home: Cane, straight Prior Level of Function/Work/Activity: 
 
He lives with his wife and ambulates independently with cane or RW at baseline. Number of Personal Factors/Comorbidities that affect the Plan of Care: 3+: HIGH COMPLEXITY EXAMINATION:  
Most Recent Physical Functioning:  
Gross Assessment: 
AROM: Generally decreased, functional 
Strength: Generally decreased, functional 
Coordination: Generally decreased, functional 
Tone: Abnormal 
Sensation: Impaired Posture: 
Posture (WDL): Exceptions to [facility] Posture Assessment: Forward head Balance: 
Sitting: Impaired Sitting - Static: Fair (occasional) Sitting - Dynamic: Poor (constant support) Standing: Impaired Bed Mobility: 
Rolling: Moderate assistance;Maximum assistance;Assist x2(x 2) Supine to Sit: Moderate assistance;Maximum assistance;Assist x2 Scooting: Moderate assistance;Assist x2 Wheelchair Mobility: 
  
Transfers: 
Sit to Stand: Total assistance;Assist x2(with JOHNNY II ) Stand to Sit: Total assistance;Assist x2(with JOHNNY II ) Bed to Chair: Total assistance;Assist x2(with JOHNNY II ) Gait: 
  
   
  
Body Structures Involved: 1. Heart 2. Lungs 3. Muscles Body Functions Affected: 1. Cardio 2. Respiratory 3. Neuromusculoskeletal 
4. Movement Related Activities and Participation Affected: 1. Mobility 2. Self Care 3. Domestic Life 4. Community, Social and [place] Number of elements that affect the Plan of Care: 4+: HIGH COMPLEXITY CLINICAL PRESENTATION:  
Presentation: Evolving clinical presentation with changing clinical characteristics: MODERATE COMPLEXITY CLINICAL DECISION MAKIN[address] [place] [address] [address] [id number] AM-PAC 6 Clicks Basic Mobility Inpatient Short Form How much difficulty does the patient currently have. .. Unable A Lot A Little None 1. Turning over in bed (including adjusting bedclothes, sheets and blankets)? [x] 1   [] 2   [] 3   [] 4  
2. Sitting down on and standing up from a chair with arms ( e.g., wheelchair, bedside commode, etc.)   [x] 1   [] 2   [] 3   [] 4  
3. Moving from lying on back to sitting on the side of the bed? [x] 1   [] 2   [] 3   [] 4 How much help from another person does the patient currently need. .. Total A Lot A Little None 4. Moving to and from a bed to a chair (including a wheelchair)? [x] 1   [] 2   [] 3   [] 4  
5. Need to walk in hospital room?    [x] 1   [] 2   [] 3   [] 4  
 6.  Climbing 3-5 steps with a railing? [x] 1   [] 2   [] 3   [] 4  
© 2007, Trustees of [address] [place] [address] [address] [id number], under license to [employer]. All rights reserved Score:  Initial: 6 Most Recent: X (Date: -- ) Interpretation of Tool:  Represents activities that are increasingly more difficult (i.e. Bed mobility, Transfers, Gait). Medical Necessity:    
· Patient is expected to demonstrate progress in  
· strength, range of motion, balance, coordination, functional technique, and activity tolerance ·  to  
· decrease assistance required with all functional mobility · . Reason for Services/Other Comments: 
· Patient continues to require skilled intervention due to · medical complications and patient unable to attend/participate in therapy as expected · . Use of outcome tool(s) and clinical judgement create a POC that gives a: Questionable prediction of patient's progress: MODERATE COMPLEXITY  
  
 
 
 
TREATMENT:  
(In addition to Assessment/Re-Assessment sessions the following treatments were rendered) Pre-treatment Symptoms/Complaints: \"Yes I can do it. \"    
Pain: Initial:  
Pain Intensity 1: 0(no specific pain denoted at this time.  )  Post Session:  0/10 post session Therapeutic Activity: (    25 minutes): Therapeutic activities including Bed transfers, sit to stand transfers, static standing balance, posture ground to improve mobility, strength and balance. Required moderate cues for breathing while standing   to promote dynamic balance in standing. Therapeutic Exercise: ( 0  minutes):  Exercises per grid below to improve mobility, strength, balance and coordination. Required minimum to moderate verbal cues to participate and perform correctly. Progressed resistance and repetitions as indicated. DATE: 1/26/20 1/29/20 Straight leg raise Hip abduct/ adduct X5 AAB Heel slides  X3 AB Hip external/ internal rotation Ankle dorsiflexion/ plantarflexion X5 AB 25 Sitting unsupported x5'       
LAQ  25      
marching 25 Key:  A=active, AA=active assisted, P=passive, B=bilaterally, R=right, L=left Braces/Orthotics/Lines/Etc:  
· IV 
· brown catheter · Wound vac · O2 Device: Heated, Hi flow nasal cannula Treatment/Session Assessment:   
· Response to Treatment:  Fatigued after treatment, but did better with vitals · Interdisciplinary Collaboration:  
o Physical Therapist 
o Registered Nurse · After treatment position/precautions:  
o Up in chair 
o Bed/Chair-wheels locked 
o Call light within reach 
o RN notified · Compliance with Program/Exercises: Will assess as treatment progresses · Recommendations/Intent for next treatment session: \"Next visit will focus on advancements to more challenging activities and reduction in assistance provided\". Total Treatment Duration: PT Patient Time In/Time Out Time In: 3204 Time Out: 1230 Talita Mukherjee, PT

## 2020-02-01 NOTE — PROGRESS NOTES
Wound care provided to Bilateral feet and toes. The old dressing was removed and the feet were cleansed with chg. The toes were cleansed with dermal wound cleanser and xeroform, and gauze were placed around and between the toes. The patient reports no sensation in the toes. The toes and feet were wrapped with gauze. Patient tolerated well. NTG paste placed on toes for vasodilation.

## 2020-02-01 NOTE — PROGRESS NOTES
Critical Care Daily Progress Note: 1/31/2020 More Garcia Admission Date: 1/10/2020 The patient's chart is reviewed and the patient is discussed with the staff. Patient had CABG x 3 and AVR on 1/10.  Slow to improve. Had small ptx that self resolved. 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 Subjective:  
Currently on 53% airvo, says he is sob, still on argatraban Current Facility-Administered Medications Medication Dose Route Frequency  albuterol-ipratropium (DUO-NEB) 2.5 MG-0.5 MG/3 ML  3 mL Nebulization QID RT  
 amiodarone (CORDARONE) tablet 400 mg  400 mg Oral Q12H  
 famotidine (PEPCID) tablet 20 mg  20 mg Oral DAILY  nitroglycerin (NITROBID) 2 % ointment 1 Inch  1 Inch Topical TID  insulin regular (NOVOLIN R, HUMULIN R) injection 0-15 Units  0-15 Units SubCUTAneous AC&HS  ondansetron (ZOFRAN) injection 4 mg  4 mg IntraVENous Q6H PRN  
 busPIRone (BUSPAR) tablet 10 mg  10 mg Oral TID  morphine 10 mg/ml injection 5 mg  5 mg IntraVENous Q4H PRN  
 oxyCODONE-acetaminophen (PERCOCET) 5-325 mg per tablet 1 Tab  1 Tab Per NG tube Q4H PRN  
 amiodarone (CORDARONE) 450 mg in dextrose 5% 250 mL infusion  0.5-1 mg/min IntraVENous TITRATE  traMADol (ULTRAM) tablet 50 mg  50 mg Oral Q6H PRN  
 epoetin maritza-epbx (RETACRIT) 14,000 Units combo injection  14,000 Units SubCUTAneous Q7D  
 argatroban 50 mg in 0.9% sodium chloride 50 mL (1000 mcg/mL) infusion  0.5-10 mcg/kg/min IntraVENous TITRATE  alcohol 62% (NOZIN) nasal  1 Ampule  1 Ampule Topical Q12H  
 NUTRITIONAL SUPPORT ELECTROLYTE PRN ORDERS   Does Not Apply PRN  
 acetaminophen (TYLENOL) solution 650 mg  650 mg Per NG tube Q4H PRN  
 sodium chloride (NS) flush 5-40 mL  5-40 mL IntraVENous Q8H  
  sodium chloride (NS) flush 5-40 mL  5-40 mL IntraVENous PRN  
 naloxone (NARCAN) injection 0.4 mg  0.4 mg IntraVENous PRN  
 [Held by provider] metoprolol tartrate (LOPRESSOR) tablet 25 mg  25 mg Oral Q12H  
 atorvastatin (LIPITOR) tablet 80 mg  80 mg Oral QHS  dextrose (D50W) injection syrg 12.5 g  25 mL IntraVENous PRN  
 [Held by provider] aspirin chewable tablet 81 mg  81 mg Oral DAILY  magnesium sulfate 1 g/100 ml IVPB (premix or compounded)  1 g IntraVENous PRN  
 midazolam (VERSED) injection 1 mg  1 mg IntraVENous Q1H PRN  
 sodium bicarbonate (8.4%) injection 50 mEq  50 mEq IntraVENous PRN Review of Systems Constitutional:  negative for fever, chills, sweats Cardiovascular:  negative for chest pain, palpitations, syncope, edema Gastrointestinal:  negative for dysphagia, reflux, vomiting, diarrhea, abdominal pain, or melena Neurologic:  negative for focal weakness, numbness, headache Objective:  
 
Vitals:  
 01/31/20 1659 01/31/20 1730 01/31/20 1759 01/31/20 1900 BP: 113/67 120/74 116/56 Pulse: 79 75 72 Resp: 21 (!) 38 21 Temp:      
SpO2: 99% 100% 100% 99% Weight:      
Height:      
 
 
 
Intake/Output Summary (Last 24 hours) at 1/31/2020 2210 Last data filed at 1/31/2020 1210 Gross per 24 hour Intake 58.8 ml Output 3820 ml Net -3761.2 ml Physical Exam:         
Constitutional:  the patient is well developed and in no acute distress EENMT:  Sclera clear, pupils equal, oral mucosa moist 
Respiratory: some basilar crackles Cardiovascular:  RRR without M,G,R 
Gastrointestinal: soft and non-tender; with positive bowel sounds. Musculoskeletal: warm without cyanosis. There is 1+ lower extremity edema. Skin:  no jaundice or rashes, sternal wounds Neurologic: no gross neuro deficits Psychiatric:  alert and oriented x 3 LINES: 
Dialysis lien DRIPS: 
argatraban CXR:  
None today LAB Recent Labs  
  01/31/20 
2134 01/31/20 [phone] 01/31/20 
1148 01/31/20 
0796 01/30/20 
2158 GLUCPOC 323* 291* 149* 167* 242* Recent Labs  
  01/31/20 
0323 01/30/20 
1169 01/29/20 
8285 WBC 12.0* 13.8* 15.8* HGB 8.1* 8.4* 8.8* HCT 26.3* 26.6* 28.3*  
* 117* 109* Recent Labs  
  01/31/20 
0323 01/30/20 
6011 01/29/20 
1196 * 136 139  
K 4.4 4.3 4.4 CL 99 100 103 CO2 28 29 31 * 162* 142* BUN 58* 37* 33* CREA 5.81* 4.54* 3.85* MG 2.3  --  2.2 PHOS 5.6*  --  3.8*  
CA 8.0* 8.3 8.2* No results for input(s): PH, PCO2, PO2, HCO3, PHI, PCO2I, PO2I, HCO3I in the last 72 hours. No results for input(s): LCAD, LAC in the last 72 hours. No results for input(s): PH, PCO2, PO2, HCO3, PHI, PCO2I, PO2I, HCO3I in the last 72 hours. Patient Active Problem List  
Diagnosis Code  DM type 2 (diabetes mellitus, type 2) ([facility]) E11.9  
 Gout M10.9  
 HTN (hypertension) I10  
 BPH (benign prostatic hyperplasia) N40.0  Seasonal allergic rhinitis J30.2  
 HLD (hyperlipidemia) E78.5  Nonrheumatic aortic valve stenosis I35.0  Obesity, morbid ([address] E66.01  
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  
 Bilateral carotid artery stenosis I65.23  
 Aortic valve stenosis I35.0  
 CAD (coronary artery disease) I25.10  Weaning from respirator [facility]) Z99.11  
 Acute hypoxemic respiratory failure ([facility]) J96.01  
 S/P CABG x 3 Z95.1  
 S/P AVR Z95.2  Acute renal failure (ARF) ([facility]) N17.9  Atrial fibrillation ([address] I48.91  Shock ([address] R57.9  Bilateral pneumothorax J93.9  Thrombocytopenia (HCC) D69.6  
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  
 HIT (heparin-induced thrombocytopenia) ([facility]) ICD-10-CM: J58.98 ICD-9-CM: 289.84  1/23/2020 Unknown Bilateral pneumothorax ICD-10-CM: J93.9 ICD-9-CM: 512.89  1/17/2020 Unknown Thrombocytopenia ([facility] ICD-10-CM: D69.6 ICD-9-CM: 287.5  1/17/2020 Unknown Shock ([facility] ICD-10-CM: R57.9 ICD-9-CM: 785.50  1/15/2020 Unknown Atrial fibrillation [facility]) ICD-10-CM: I48.91 
ICD-9-CM: 427.31  1/13/2020 Unknown Acute renal failure (ARF) (HCC) ICD-10-CM: N17.9 ICD-9-CM: 584.9  1/11/2020 Unknown Aortic valve stenosis ICD-10-CM: I35.0 ICD-9-CM: 424.1  1/10/2020 Unknown CAD (coronary artery disease) ICD-10-CM: I25.10 ICD-9-CM: 414.00  1/10/2020 Unknown Weaning from respirator [facility]) ICD-10-CM: Z99.11 ICD-9-CM: V46.13  1/10/2020 Unknown Acute hypoxemic respiratory failure ([facility] ICD-10-CM: J96.01 
ICD-9-CM: 518.81  1/10/2020 On airvo 53% * (Principal) S/P CABG x 3 ICD-10-CM: Z95.1 ICD-9-CM: V45.81  1/10/2020 Unknown S/P AVR ICD-10-CM: Z95.2 ICD-9-CM: V43.3  1/10/2020 Unknown Plan:  (Medical Decision Making) 1    Dialysis per renal 
2    Wean fio2 
3    argatraban 
 
-- 
 
More than 50% of the time documented was spent in face-to-face contact with the patient and in the care of the patient on the floor/unit where the patient is located.  
 
Radha Bee MD

## 2020-02-01 NOTE — DIALYSIS
Consent verified for renal replacement therapy. HD initiated using left upper chest CVC. Machine settings per MD order. Pt alert, /60 HR 75. Will monitor during treatment.

## 2020-02-01 NOTE — PROGRESS NOTES
Today's Date: 2/1/2020 Date of Admission: 1/10/2020 Chart Reviewed. Subjective:  
 
Pt feels ok. He denies any dyspnea. Gradual progress. Medications Reviewed. Objective:  
 
Vitals:  
 02/01/20 0401 02/01/20 8367 02/01/20 0459 02/01/20 2934 BP:  121/58 137/63 Pulse:  70 67 Resp:  18 17 Temp:      
SpO2: 94% 95% 95% Weight:    130.7 kg (288 lb 3.2 oz) Height:      
 
 
Intake and Output Current Shift: 01/31 1901 - 02/01 0700 In: 752.8 [P.O.:600; I.V.:152.8] Out: - Last 3 Shifts: 01/30 0701 - 01/31 1900 In: 1341.6 [P.O.:1200; I.V.:141.6] Out: 3820 [Urine:20] Physical Exam: 
General: Well Developed, Obese, No Acute Distress, Alert & Oriented x 3, Appropriate mood Neck: supple, no JVD Heart: S1S2 with RRR Lungs: Clear throughout auscultation bilaterally Abd: soft, nontender, nondistended, with good bowel sounds Ext: + edema bilaterally Sternal incision: clean, dry, and intact Skin: warm and dry LABS Data Review:  
Recent Labs 02/01/20 
0640 01/31/20 
0223  135* K 4.4 4.4 MG  --  2.3 BUN 49* 58* CREA 5.14* 5.81* * 180* WBC 11.7* 12.0* HGB 8.1* 8.1* HCT 26.4* 26.3*  
* 130* Estimated Creatinine Clearance: 17.4 mL/min (A) (based on SCr of 5.14 mg/dL (H)). Assessment/Plan:  
 
Principal Problem: S/P CABG x 3 (1/10/2020) Continued to improve, on Airvo, intermittent a-fib, on HD Active Problems: Aortic valve stenosis (1/10/2020) S/p AVR 
 
  CAD (coronary artery disease) (1/10/2020) S/p CABG Weaning from respirator [facility]) (1/10/2020) Acute hypoxemic respiratory failure ([address] (1/10/2020) On Airvo S/P AVR (1/10/2020) Acute renal failure (ARF) ([facility] [address] (1/11/2020) On HD, nephrology following Will likely need Permacath; will need to coordinate timing (? Prior to starting coumadin?) Atrial fibrillation ([facility] [address] (1/13/2020) Intermittent, continue oral amiodarone, no BB with low BP at times, will transition to coumadin when platelets improved Shock ([address] (1/15/2020) 
resolved Bilateral pneumothorax (1/17/2020) 
resolved Thrombocytopenia ([address] (1/17/2020) Due to HIT 
 
  HIT (heparin-induced thrombocytopenia) ([address] (1/23/2020) On argatroban, per heme/onc plan to start coumadin when platelet count >[phone] See note above inre permacath.  
 
 
 
Fred Iniguez MD

## 2020-02-02 NOTE — PROGRESS NOTES
Dressing change to bilateral feet and toes completed. Cleaned with dermal wound cleanser, Xeroform and 4x4's applied to area, 2x2's placed in between toes, wrapped with Kerlix wrap, pillows placed under heels to offload, patient tolerated well.

## 2020-02-02 NOTE — PROGRESS NOTES
REVISED GOALS and ONGOING 2/1/2020 all goals  : 
 
STG: 
(1.)Patient will roll side to side in bed with  MODERATE ASSIST within 3-5 day(s). (2.)Patient will move from supine to sit and sit to supine  in bed with  MODERATE ASSIST within 3-5 day(s). (3.)Patient will sit edge of bed/chair with STAND BY ASSIST and good balance statically  for 10 minutes within 3-5 day(s). 4.  Patient will perform 1 sets of 10 repetitions of active range of motion exercises for bilateral lower extremity(s) with  cueing within 3-5 day(s). 5.  Patient will tolerate sitting with bed in chair position for 2 hour 2x/day to facilitate tolerance to sitting in chair within 3-5 days. LTG: 
(1.)Patient will move from supine to sit and sit to supine  and roll side to side in bed with  MIN ASSIST  within 7-10 day(s). (2.)Patient will transfer from bed to chair and chair to bed with  MODERATE ASSIST using the least restrictive device within 7-10 day(s). (3.)Patient will stand with  MINIMAL ASSIST x 2 for 2 minutes with the least restrictive device within 7-10 day(s). 4.  Patient will exhibit 3/5 strength B LE's to facilitate increased independence with bed mobility within 7-10 days. PHYSICAL THERAPY: Daily Note and AM 2/2/2020 INPATIENT: PT Visit Days : 2 Payor: Ravindra Betancourt / Plan: Carol Lucero / Product Type: [employer] Care Medicare /   
  
NAME/AGE/GENDER: Carrillo Arias is a 69 y.o. male PRIMARY DIAGNOSIS: Atherosclerosis of native coronary artery of native heart with unstable angina pectoris ([address] [I25.110] Nonrheumatic aortic valve stenosis [I35.0] CAD (coronary artery disease) [I25.10] Aortic valve stenosis [I35.0] S/P CABG x 3 S/P CABG x 3 Procedure(s) (LRB): 
CORONARY ARTERY BYPASS GRAFT WITH AVR/ (CABG X 3 )/ LIMA (N/A) VEIN HARVEST/ GREATER SAPHENOUS (Left) ESOPHAGEAL TRANS ECHOCARDIOGRAM (N/A) 23 Days Post-Op ICD-10: Treatment Diagnosis: · Other abnormalities of gait and mobility (R26.89) Precaution/Allergies: 
Heparin; Amoxicillin; Keflex [cephalexin]; and Pcn [penicillins] ASSESSMENT:  
 
Mr. Jaylin Montgomery underwent above surgery on 1/10/20 and was only recently extubated. He lives with his wife and ambulates independently with cane or RW at baseline. 2/2/20- Pt alert and anxious appearing in bed upon arrival.  He was agreeable to transfer to recliner via [address] given sternal precautions. Pt participated in rolling with min-modA and scooting with modA x 2. Pt tolerated transfer well with cues for head control. He was totalA for chair transfers and B LEs propped on step to promote increased B knee flexion. No significant progress today. This section established at most recent assessment PROBLEM LIST (Impairments causing functional limitations): 1. Decreased Strength 2. Decreased ADL/Functional Activities 3. Decreased Transfer Abilities 4. Decreased Ambulation Ability/Technique 5. Decreased Balance 6. Decreased Activity Tolerance 7. Increased Fatigue 8. Increased Shortness of Breath 9. Decreased Knowledge of Precautions INTERVENTIONS PLANNED: (Benefits and precautions of physical therapy have been discussed with the patient.) 1. Balance Exercise 2. Bed Mobility 3. Neuromuscular Re-education/Strengthening 4. Therapeutic Activites 5. Therapeutic Exercise/Strengthening 6. Transfer Training 7. education TREATMENT PLAN: Frequency/Duration: twice daily for duration of hospital stay Rehabilitation Potential For Stated Goals: Good REHAB RECOMMENDATIONS (at time of discharge pending progress):   
Placement: It is my opinion, based on this patient's performance to date, that Mr. Jaylin Montgomery may benefit from intensive therapy at a [facility] after discharge due to the functional deficits listed above that are likely to improve with skilled rehabilitation and severe debility.  
Equipment:  
? tbd  
 ? None at this time HISTORY:  
History of Present Injury/Illness (Reason for Referral): 
Patient admitted for above surgery. Past Medical History/Comorbidities:  
Mr. Rica Melgoza  has a past medical history of Arthritis, BPH (benign prostatic hyperplasia) (1/14/2013), CAD (coronary artery disease), DM type 2 (diabetes mellitus, type 2) ([facility] [address] (dx 2004), Dyspnea, Gout (1/14/2013), HLD (hyperlipidemia) (1/14/2013), HTN (hypertension), Morbid obesity ([facility] [address] (9/3/14), Psychiatric disorder, Rheumatic fever, Seasonal allergic rhinitis, Severe aortic valve stenosis, Thyroid disease, and Unspecified sleep apnea (2016). Mr. Rica Melgoza  has a past surgical history that includes hx tonsil and adenoidectomy; hx heart catheterization (12/23/2019); hx orthopaedic (Left); and hx cataract removal (Bilateral, 2012). Social History/Living Environment:  
Home Environment: Private residence One/Two Story Residence: One story Living Alone: No 
Support Systems: Spouse/Significant Other/Partner Patient Expects to be Discharged to[de-identified] Private residence Current DME Used/Available at Home: Cane, straight Prior Level of Function/Work/Activity: 
 
He lives with his wife and ambulates independently with cane or RW at baseline. Number of Personal Factors/Comorbidities that affect the Plan of Care: 3+: HIGH COMPLEXITY EXAMINATION:  
Most Recent Physical Functioning:  
Gross Assessment: 
  
         
  
Posture: 
  
Balance: 
Sitting: Impaired Sitting - Static: Supported sitting Sitting - Dynamic: Supported sitting Bed Mobility: 
Rolling: Minimum assistance; Moderate assistance Supine to Sit: Total assistance Scooting: Moderate assistance;Assist x2 Interventions: Verbal cues Wheelchair Mobility: 
  
Transfers: 
Bed to Chair: Total assistance(via corinna lift) Gait: 
  
   
  
Body Structures Involved: 1. Heart 2. Lungs 3. Muscles Body Functions Affected: 1. Cardio 2. Respiratory 3.  Neuromusculoskeletal 
 4. Movement Related Activities and Participation Affected: 1. Mobility 2. Self Care 3. Domestic Life 4. Community, Social and [place] Number of elements that affect the Plan of Care: 4+: HIGH COMPLEXITY CLINICAL PRESENTATION:  
Presentation: Evolving clinical presentation with changing clinical characteristics: MODERATE COMPLEXITY CLINICAL DECISION MAKIN[address] [place] [address] AM-PAC 6 Clicks Basic Mobility Inpatient Short Form How much difficulty does the patient currently have. .. Unable A Lot A Little None 1. Turning over in bed (including adjusting bedclothes, sheets and blankets)? [x] 1   [] 2   [] 3   [] 4  
2. Sitting down on and standing up from a chair with arms ( e.g., wheelchair, bedside commode, etc.)   [x] 1   [] 2   [] 3   [] 4  
3. Moving from lying on back to sitting on the side of the bed? [x] 1   [] 2   [] 3   [] 4 How much help from another person does the patient currently need. .. Total A Lot A Little None 4. Moving to and from a bed to a chair (including a wheelchair)? [x] 1   [] 2   [] 3   [] 4  
5. Need to walk in hospital room? [x] 1   [] 2   [] 3   [] 4  
6. Climbing 3-5 steps with a railing? [x] 1   [] 2   [] 3   [] 4  
© , Trustees of [address] [place] [address], under license to [employer]. All rights reserved Score:  Initial: 6 Most Recent: X (Date: -- ) Interpretation of Tool:  Represents activities that are increasingly more difficult (i.e. Bed mobility, Transfers, Gait). Medical Necessity:    
· Patient is expected to demonstrate progress in  
· strength, range of motion, balance, coordination, functional technique, and activity tolerance ·  to  
· decrease assistance required with all functional mobility · . Reason for Services/Other Comments: 
· Patient continues to require skilled intervention due to · medical complications and patient unable to attend/participate in therapy as expected · . Use of outcome tool(s) and clinical judgement create a POC that gives a: Questionable prediction of patient's progress: MODERATE COMPLEXITY  
  
 
 
 
TREATMENT:  
(In addition to Assessment/Re-Assessment sessions the following treatments were rendered) Pre-treatment Symptoms/Complaints: \"That was great\" Pain: Initial:  
Pain Intensity 1: 0  Post Session:  0/10 post session Therapeutic Activity: (    23 minutes): Therapeutic activities including rolling, scooting, chair transfer to improve mobility, strength and balance. Required moderate cues for   to bed mobility techniques and safe transfer to chair. Therapeutic Exercise: ( 0  minutes):  Exercises per grid below to improve mobility, strength, balance and coordination. Required minimum to moderate verbal cues to participate and perform correctly. Progressed resistance and repetitions as indicated. DATE: 1/26/20 1/29/20 Straight leg raise Hip abduct/ adduct X5 AAB Heel slides  X3 AB Hip external/ internal rotation Ankle dorsiflexion/ plantarflexion X5 AB 25 Sitting unsupported x5'       
LAQ  25      
marching  25 Key:  A=active, AA=active assisted, P=passive, B=bilaterally, R=right, L=left Braces/Orthotics/Lines/Etc:  
· IV 
· brown catheter · Wound vac · O2 Device: Heated, Hi flow nasal cannula Treatment/Session Assessment:   
· Response to Treatment:  See above. · Interdisciplinary Collaboration:  
o Physical Therapist 
o Registered Nurse · After treatment position/precautions:  
o Up in chair 
o Bed alarm/tab alert on 
o Bed/Chair-wheels locked 
o Bed in low position 
o Nurse at bedside · Compliance with Program/Exercises: Will assess as treatment progresses · Recommendations/Intent for next treatment session: \"Next visit will focus on advancements to more challenging activities and reduction in assistance provided\". Total Treatment Duration: PT Patient Time In/Time Out 
 Time In: 2829 Time Out: 8613 Sheba Gaspar, PT, DPT

## 2020-02-02 NOTE — PROGRESS NOTES
REVISED GOALS and ONGOING 2/1/2020 all goals  : 
 
STG: 
(1.)Patient will roll side to side in bed with  MODERATE ASSIST within 3-5 day(s). (2.)Patient will move from supine to sit and sit to supine  in bed with  MODERATE ASSIST within 3-5 day(s). (3.)Patient will sit edge of bed/chair with STAND BY ASSIST and good balance statically  for 10 minutes within 3-5 day(s). 4.  Patient will perform 1 sets of 10 repetitions of active range of motion exercises for bilateral lower extremity(s) with  cueing within 3-5 day(s). 5.  Patient will tolerate sitting with bed in chair position for 2 hour 2x/day to facilitate tolerance to sitting in chair within 3-5 days. LTG: 
(1.)Patient will move from supine to sit and sit to supine  and roll side to side in bed with  MIN ASSIST  within 7-10 day(s). (2.)Patient will transfer from bed to chair and chair to bed with  MODERATE ASSIST using the least restrictive device within 7-10 day(s). (3.)Patient will stand with  MINIMAL ASSIST x 2 for 2 minutes with the least restrictive device within 7-10 day(s). 4.  Patient will exhibit 3/5 strength B LE's to facilitate increased independence with bed mobility within 7-10 days. PHYSICAL THERAPY: Daily Note and PM 2/2/2020 INPATIENT: PT Visit Days : 2 Payor: Ramiro Macedo / Plan: [employer] / Product Type: [employer] Care Medicare /   
  
NAME/AGE/GENDER: Carlos Chilel is a 69 y.o. male PRIMARY DIAGNOSIS: Atherosclerosis of native coronary artery of native heart with unstable angina pectoris ([address] [I25.110] Nonrheumatic aortic valve stenosis [I35.0] CAD (coronary artery disease) [I25.10] Aortic valve stenosis [I35.0] S/P CABG x 3 S/P CABG x 3 Procedure(s) (LRB): 
CORONARY ARTERY BYPASS GRAFT WITH AVR/ (CABG X 3 )/ LIMA (N/A) VEIN HARVEST/ GREATER SAPHENOUS (Left) ESOPHAGEAL TRANS ECHOCARDIOGRAM (N/A) 23 Days Post-Op ICD-10: Treatment Diagnosis: · Other abnormalities of gait and mobility (R26.89) Precaution/Allergies: 
Heparin; Amoxicillin; Keflex [cephalexin]; and Pcn [penicillins] ASSESSMENT:  
 
Mr. Joanna Estrella underwent above surgery on 1/10/20 and was only recently extubated. He lives with his wife and ambulates independently with cane or RW at baseline. 2/2/20- Pt alert and anxious appearing in bed upon arrival.  He was agreeable to transfer to recliner via [address] given sternal precautions. Pt participated in rolling with min-modA and scooting with modA x 2. Pt tolerated transfer well with cues for head control. He was totalA for chair transfers and B LEs propped on step to promote increased B knee flexion. No significant progress today. PM: Assisted pt back to bed from chair via corinna lift. Pt followed commands for body awareness throughout without any signs of distress. He performed bed mobility with Rajwinder x 2 and bridging with Rajwinder. Slight progress in bed mobility. This section established at most recent assessment PROBLEM LIST (Impairments causing functional limitations): 1. Decreased Strength 2. Decreased ADL/Functional Activities 3. Decreased Transfer Abilities 4. Decreased Ambulation Ability/Technique 5. Decreased Balance 6. Decreased Activity Tolerance 7. Increased Fatigue 8. Increased Shortness of Breath 9. Decreased Knowledge of Precautions INTERVENTIONS PLANNED: (Benefits and precautions of physical therapy have been discussed with the patient.) 1. Balance Exercise 2. Bed Mobility 3. Neuromuscular Re-education/Strengthening 4. Therapeutic Activites 5. Therapeutic Exercise/Strengthening 6. Transfer Training 7. education TREATMENT PLAN: Frequency/Duration: twice daily for duration of hospital stay Rehabilitation Potential For Stated Goals: Good REHAB RECOMMENDATIONS (at time of discharge pending progress):   
Placement: It is my opinion, based on this patient's performance to date, that Mr. Joanna Estrella may benefit from intensive therapy at a [facility] after discharge due to the functional deficits listed above that are likely to improve with skilled rehabilitation and severe debility. Equipment:  
? tbd  
? None at this time HISTORY:  
History of Present Injury/Illness (Reason for Referral): 
Patient admitted for above surgery. Past Medical History/Comorbidities:  
Mr. Joanna Estrella  has a past medical history of Arthritis, BPH (benign prostatic hyperplasia) (1/14/2013), CAD (coronary artery disease), DM type 2 (diabetes mellitus, type 2) ([facility] [address] (dx 2004), Dyspnea, Gout (1/14/2013), HLD (hyperlipidemia) (1/14/2013), HTN (hypertension), Morbid obesity ([facility] [address] (9/3/14), Psychiatric disorder, Rheumatic fever, Seasonal allergic rhinitis, Severe aortic valve stenosis, Thyroid disease, and Unspecified sleep apnea (2016). Mr. Joanna Estrella  has a past surgical history that includes hx tonsil and adenoidectomy; hx heart catheterization (12/23/2019); hx orthopaedic (Left); and hx cataract removal (Bilateral, 2012). Social History/Living Environment:  
Home Environment: Private residence One/Two Story Residence: One story Living Alone: No 
Support Systems: Spouse/Significant Other/Partner Patient Expects to be Discharged to[de-identified] Private residence Current DME Used/Available at Home: Cane, straight Prior Level of Function/Work/Activity: 
 
He lives with his wife and ambulates independently with cane or RW at baseline. Number of Personal Factors/Comorbidities that affect the Plan of Care: 3+: HIGH COMPLEXITY EXAMINATION:  
Most Recent Physical Functioning:  
Gross Assessment: 
  
         
  
Posture: 
  
Balance: 
Sitting: Impaired Sitting - Static: Supported sitting Sitting - Dynamic: Supported sitting Bed Mobility: 
Rolling: Minimum assistance Supine to Sit: Total assistance Sit to Supine: Total assistance Scooting: Minimum assistance Interventions: Manual cues; Safety awareness training;Verbal cues; Visual cues Wheelchair Mobility: 
  
Transfers: 
Bed to Chair: Total assistance(corinna lift) Gait: 
  
   
  
Body Structures Involved: 1. Heart 2. Lungs 3. Muscles Body Functions Affected: 1. Cardio 2. Respiratory 3. Neuromusculoskeletal 
4. Movement Related Activities and Participation Affected: 1. Mobility 2. Self Care 3. Domestic Life 4. Community, Social and [place] Number of elements that affect the Plan of Care: 4+: HIGH COMPLEXITY CLINICAL PRESENTATION:  
Presentation: Evolving clinical presentation with changing clinical characteristics: MODERATE COMPLEXITY CLINICAL DECISION MAKIN[address] [place] [address] AM-PAC 6 Clicks Basic Mobility Inpatient Short Form How much difficulty does the patient currently have. .. Unable A Lot A Little None 1. Turning over in bed (including adjusting bedclothes, sheets and blankets)? [x] 1   [] 2   [] 3   [] 4  
2. Sitting down on and standing up from a chair with arms ( e.g., wheelchair, bedside commode, etc.)   [x] 1   [] 2   [] 3   [] 4  
3. Moving from lying on back to sitting on the side of the bed? [x] 1   [] 2   [] 3   [] 4 How much help from another person does the patient currently need. .. Total A Lot A Little None 4. Moving to and from a bed to a chair (including a wheelchair)? [x] 1   [] 2   [] 3   [] 4  
5. Need to walk in hospital room? [x] 1   [] 2   [] 3   [] 4  
6. Climbing 3-5 steps with a railing? [x] 1   [] 2   [] 3   [] 4  
© 2007, Trustees of [address] [place] [address], under license to [employer]. All rights reserved Score:  Initial: 6 Most Recent: X (Date: -- ) Interpretation of Tool:  Represents activities that are increasingly more difficult (i.e. Bed mobility, Transfers, Gait).  
 
Medical Necessity:    
· Patient is expected to demonstrate progress in  
 · strength, range of motion, balance, coordination, functional technique, and activity tolerance ·  to  
· decrease assistance required with all functional mobility · . Reason for Services/Other Comments: 
· Patient continues to require skilled intervention due to · medical complications and patient unable to attend/participate in therapy as expected · . Use of outcome tool(s) and clinical judgement create a POC that gives a: Questionable prediction of patient's progress: MODERATE COMPLEXITY  
  
 
 
 
TREATMENT:  
(In addition to Assessment/Re-Assessment sessions the following treatments were rendered) Pre-treatment Symptoms/Complaints:  \"Don't apologize\" Pain: Initial:  
Pain Intensity 1: 0  Post Session:  0/10 FLACC Therapeutic Activity: (    23 minutes): Therapeutic activities including rolling, scooting, chair transfer to improve mobility, strength and balance. Required moderate cues for   to bed mobility techniques and safe transfer to chair. Therapeutic Exercise: ( 0  minutes):  Exercises per grid below to improve mobility, strength, balance and coordination. Required minimum to moderate verbal cues to participate and perform correctly. Progressed resistance and repetitions as indicated. DATE: 1/26/20 1/29/20 Straight leg raise Hip abduct/ adduct X5 AAB Heel slides  X3 AB Hip external/ internal rotation Ankle dorsiflexion/ plantarflexion X5 AB 25 Sitting unsupported x5'       
LAQ  25 marching 25 Key:  A=active, AA=active assisted, P=passive, B=bilaterally, R=right, L=left Braces/Orthotics/Lines/Etc:  
· IV 
· brown catheter · Wound vac · O2 Device: Heated, Hi flow nasal cannula Treatment/Session Assessment:   
· Response to Treatment:  See above. · Interdisciplinary Collaboration:  
o Physical Therapist 
o Registered Nurse · After treatment position/precautions:  
o Supine in bed 
o Bed/Chair-wheels locked o Nurse at bedside · Compliance with Program/Exercises: Will assess as treatment progresses · Recommendations/Intent for next treatment session: \"Next visit will focus on advancements to more challenging activities and reduction in assistance provided\". Total Treatment Duration: PT Patient Time In/Time Out Time In: 4038 Time Out: 1318 Jai Adan PT, DPT

## 2020-02-02 NOTE — PROGRESS NOTES
Today's Date: 2/2/2020 Date of Admission: 1/10/2020 Chart Reviewed. Subjective:  
 
Pt feels ok. He denies any dyspnea. Gradual progress. Not ambulating yet. HD yesterday. Platelets 108 today on argatroban. Medications Reviewed. Objective:  
 
Vitals:  
 02/02/20 0257 02/02/20 0258 02/02/20 0300 02/02/20 0400 BP: 112/55 Pulse: 70 67 66 66 Resp: 15 18 19 18 Temp: 98 °F (36.7 °C) SpO2: 96% 96% 95% 100% Weight: 127.5 kg (281 lb) Height:      
 
 
Intake and Output Current Shift: 02/01 1901 - 02/02 0700 In: 160.7 [P.O.:120; I.V.:40.7] Out: 75 [Urine:75] Last 3 Shifts: 01/31 0701 - 02/01 1900 In: 2157.5 [P.O.:1940; I.V.:217.5] Out: 7900 [Urine:100] Physical Exam: 
General: Well Developed, Obese, No Acute Distress, Alert & Oriented x 3, Appropriate mood Neck: supple, no JVD Heart: S1S2 with RRR Lungs: Clear throughout auscultation bilaterally Abd: soft, nontender, nondistended, with good bowel sounds Ext: + edema bilaterally Sternal incision: clean, dry, and intact Skin: warm and dry LABS Data Review:  
Recent Labs 02/02/20 
9757 02/01/20 
3278 01/31/20 
3160  138 135* K 4.6 4.4 4.4 MG  --   --  2.3 BUN 44* 49* 58* CREA 4.86* 5.14* 5.81* * 136* 180* WBC 10.3 11.7* 12.0* HGB 8.2* 8.1* 8.1* HCT 27.2* 26.4* 26.3*  
* 117* 130* Estimated Creatinine Clearance: 18.2 mL/min (A) (based on SCr of 4.86 mg/dL (H)). Assessment/Plan:  
 
Principal Problem: S/P CABG x 3 (1/10/2020) Continued to improve, on Airvo, intermittent a-fib, on HD Active Problems: Aortic valve stenosis (1/10/2020) S/p AVR 
 
  CAD (coronary artery disease) (1/10/2020) S/p CABG Weaning from respirator [facility]) (1/10/2020) Acute hypoxemic respiratory failure ([address] (1/10/2020) On Airvo S/P AVR (1/10/2020) Acute renal failure (ARF) ([address] (1/11/2020) On HD, nephrology following Will likely need Permacath; will need to coordinate timing (? Prior to starting coumadin?) Atrial fibrillation ([address] (1/13/2020) Intermittent, continue oral amiodarone, no BB with low BP at times, will transition to coumadin when platelets improved Shock ([address] (1/15/2020) 
resolved Bilateral pneumothorax (1/17/2020) 
resolved Thrombocytopenia ([address] (1/17/2020) Due to HIT 
 
  HIT (heparin-induced thrombocytopenia) ([address] (1/23/2020) On argatroban, per heme/onc plan to start coumadin when platelet count >165,869 See note above inre permacath.  
 
 
 
Brannon Dunne MD

## 2020-02-02 NOTE — PROGRESS NOTES
Hospitalist Progress Note Admit Date:  1/10/2020  4:59 AM  
Name:  Ruba Richardson Age:  69 y.o. 
:  1945 MRN:  [id number] PCP:  Ean Calderon MD 
Treatment Team: Attending Provider: Alessia Morrow MD; Consulting Provider: Julieth Cheema MD; Consulting Provider: Aishwarya Enamorado MD; Care Manager: Bre Workman; Consulting Provider: Lorin Briseno MD; Utilization Review: Mamie Eid RN; Utilization Review: Guanakito Moreno RN; Consulting Provider: Santos White MD; Consulting Provider: Jun Godoy MD; Physical Therapist: Charlene Alba, PT, DPT Subjective:  
43-year-old  male patient, status post CABG and status post AVR, acute hypoxic respiratory failure presently on Airvo, acute kidney injury presently on dialysis, history of A. 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 2020 Patient sitting in chair, presently on oxygen 6 L/min, says did not have a good night, says feels better now. Objective:  
 
Patient Vitals for the past 24 hrs: 
 Temp Pulse Resp BP SpO2  
20 1141  72  97/50 100 % 20 1138 98.6 °F (37 °C)      
20 0934  74 24 102/67 100 % 20 0803 97.9 °F (36.6 °C) 68 17 121/58 99 % 20 0749     100 % 20 0730  66 21  100 % 20 0659  66 26  100 % 20 0600  65 21  99 % 20 0500  70 29  100 % 20 0400  66 18  100 % 20 0300  66 19  95 % 20 0258  67 18  96 % 20 0257 98 °F (36.7 °C) 70 15 112/55 96 % 20 0256  70 16  97 % 20 0255  71 (!) 40  90 % 20 0200  67 16  100 % 20 0100  69 19  100 % 20 0001  71 19  98 % 20 2300  71 20  99 % 02/01/20 2234 98 °F (36.7 °C) 81 20 126/56 98 % 02/01/20 2200  72 19  100 % 02/01/20 2149  77 25  99 % 02/01/20 2148  76 26  99 % 02/01/20 2147  75 22  99 % 02/01/20 2146  75 23  100 % 02/01/20 2145  76 27  100 % 02/01/20 2120     100 % 02/01/20 2100  73 20  100 % 02/01/20 2000  73 21  98 % 02/01/20 1829 98 °F (36.7 °C) 72 17 130/59 100 % 02/01/20 1708  73 23 127/56 100 % 02/01/20 1645  71  139/64   
02/01/20 1629  76 (!) 45 139/64 100 % 02/01/20 1615  70  139/64 100 % 02/01/20 1558  70 19 131/64 100 % 02/01/20 1553     97 % 02/01/20 1545  75  131/64   
02/01/20 1535  74 26 134/61 100 % 02/01/20 1515    134/61   
02/01/20 1454 98.5 °F (36.9 °C) 76 25 120/58 100 % 02/01/20 1445    120/58   
02/01/20 1415    109/50   
02/01/20 1413     98 % 02/01/20 1407  75 22 109/50 100 % 02/01/20 1353  74 24 129/60  Oxygen Therapy O2 Sat (%): 100 % (02/02/20 1141) Pulse via Oximetry: 74 beats per minute (02/02/20 1141) O2 Device: Nasal cannula (02/02/20 0803) O2 Flow Rate (L/min): 5 l/min (02/02/20 0803) O2 Temperature: 87.8 °F (31 °C) (02/01/20 0659) FIO2 (%): 50 % (02/01/20 1553) Intake/Output Summary (Last 24 hours) at 2/2/2020 1254 Last data filed at 2/2/2020 1225 Gross per 24 hour Intake 1518.24 ml Output 4075 ml Net -2556.76 ml General:          Well nourished. Alert. On  oxygen 6 L/min Eyes:               Normal sclera. Extraocular movements intact. ENT:                Normocephalic, atraumatic. Moist mucous membranes CV:                  RRR. No murmur, rub, or gallop. Lungs:             Coarse breath sounds Abdomen:        Soft, nontender, nondistended. Bowel sounds normal.  Morbidly obese Extremities:     Warm and dry. Toes of bilateral feet dusky in color, normal sensation. Mild petechial lesion palmar aspect of right index finger, left thumb index and middle finger Neurologic:      CN II-XII grossly intact. Sensation intact. Skin:                Wound VAC left thigh region medial aspect Psych:             Normal mood and affect. Data Review: 
I have reviewed all labs, meds, telemetry events, and studies from the last 24 hours. Recent Results (from the past 24 hour(s)) GLUCOSE, POC Collection Time: 02/01/20  4:36 PM  
Result Value Ref Range Glucose (POC) 153 (H) 65 - 100 mg/dL GLUCOSE, POC Collection Time: 02/01/20  8:38 PM  
Result Value Ref Range Glucose (POC) 253 (H) 65 - 100 mg/dL METABOLIC PANEL, BASIC Collection Time: 02/02/20  2:56 AM  
Result Value Ref Range Sodium 136 136 - 145 mmol/L Potassium 4.6 3.5 - 5.1 mmol/L Chloride 100 98 - 107 mmol/L  
 CO2 28 21 - 32 mmol/L Anion gap 8 7 - 16 mmol/L Glucose 221 (H) 65 - 100 mg/dL BUN 44 (H) 8 - 23 MG/DL Creatinine 4.86 (H) 0.8 - 1.5 MG/DL  
 GFR est AA 15 (L) >60 ml/min/1.73m2 GFR est non-AA 13 (L) >60 ml/min/1.73m2 Calcium 8.1 (L) 8.3 - 10.4 MG/DL  
PTT Collection Time: 02/02/20  2:56 AM  
Result Value Ref Range aPTT 58.4 (H) 24.7 - 39.8 SEC  
CBC W/O DIFF Collection Time: 02/02/20  2:56 AM  
Result Value Ref Range WBC 10.3 4.3 - 11.1 K/uL  
 RBC 2.80 (L) 4.23 - 5.6 M/uL HGB 8.2 (L) 13.6 - 17.2 g/dL HCT 27.2 (L) 41.1 - 50.3 % MCV 97.1 79.6 - 97.8 FL  
 MCH 29.3 26.1 - 32.9 PG  
 MCHC 30.1 (L) 31.4 - 35.0 g/dL  
 RDW 15.1 (H) 11.9 - 14.6 % PLATELET 553 (L) 239 - 450 K/uL MPV 11.1 9.4 - 12.3 FL ABSOLUTE NRBC 0.00 0.0 - 0.2 K/uL GLUCOSE, POC Collection Time: 02/02/20  5:47 AM  
Result Value Ref Range Glucose (POC) 180 (H) 65 - 100 mg/dL GLUCOSE, POC Collection Time: 02/02/20 11:33 AM  
Result Value Ref Range Glucose (POC) 201 (H) 65 - 100 mg/dL All Micro Results Procedure Component Value Units Date/Time CULTURE, BLOOD [[id number]] Collected:  01/14/20 100 Order Status:  Completed Specimen:  Blood Updated:  01/19/20 0701 Special Requests: --     
  RIGHT 
ARTL (ART LINE) Culture result: NO GROWTH 5 DAYS     
 CULTURE, BLOOD [[id number]] Collected:  01/14/20 1145 Order Status:  Completed Specimen:  Blood Updated:  01/19/20 0701 Special Requests: --     
  LEFT 
HAND Culture result: NO GROWTH 5 DAYS     
 CULTURE, BLOOD [[id number]] Collected:  01/12/20 5330 Order Status:  Completed Specimen:  Blood Updated:  01/17/20 0740 Special Requests: --     
  RIGHT Antecubital 
  
  Culture result: NO GROWTH 5 DAYS     
 CULTURE, BLOOD [[id number]] Collected:  01/12/20 9055 Order Status:  Completed Specimen:  Blood Updated:  01/17/20 0740 Special Requests: --     
  LEFT 
HAND Culture result: NO GROWTH 5 DAYS     
 CULTURE, URINE [[id number]] Collected:  01/14/20 1950 Order Status:  Completed Specimen:  Urine from [place] Specimen Updated:  01/17/20 3456 Special Requests: NO SPECIAL REQUESTS Culture result: NO GROWTH 2 DAYS     
 CULTURE, RESPIRATORY/SPUTUM/BRONCH Bonnijustin Robles [[id number]] Collected:  01/14/20 1132 Order Status:  Completed Specimen:  Sputum,ET Suction Updated:  01/16/20 4166 Special Requests: NO SPECIAL REQUESTS     
  GRAM STAIN 15 TO 20 WBCS SEEN PER OIF  
   0 TO 1 EPITHELIAL CELLS SEEN PER OIF  
   1+ MUCUS PRESENT     
   FEW GRAM POSITIVE RODS     
   FEW GRAM POSITIVE COCCI Culture result:    
  LIGHT NORMAL RESPIRATORY BRENNAN  
     
 CULTURE, RESPIRATORY/SPUTUM/BRONCH [address] [[id number]] Collected:  01/12/20 7818 Order Status:  Completed Specimen:  Sputum from Endotracheal aspirate Updated:  01/14/20 0100 Special Requests: NO SPECIAL REQUESTS     
  GRAM STAIN 15 TO 20 WBCS SEEN PER OIF  
   0 TO 1 EPITHELIAL CELLS SEEN PER OIF  
   2+ MUCUS PRESENT     
   RARE GRAM POSITIVE COCCI Culture result:    
  LIGHT NORMAL RESPIRATORY BRENNAN CULTURE, URINE [[id number]] Collected:  01/12/20 0901 Order Status:  Completed Specimen:  Urine from Turner Specimen Updated:  01/14/20 0725 Special Requests: NO SPECIAL REQUESTS Culture result: NO GROWTH 2 DAYS Current Meds: 
Current Facility-Administered Medications Medication Dose Route Frequency  insulin lispro (HUMALOG) injection   SubCUTAneous AC&HS  
 albuterol-ipratropium (DUO-NEB) 2.5 MG-0.5 MG/3 ML  3 mL Nebulization Q4H PRN  
 insulin glargine (LANTUS) injection 10 Units  10 Units SubCUTAneous QHS  albuterol-ipratropium (DUO-NEB) 2.5 MG-0.5 MG/3 ML  3 mL Nebulization QID RT  
 amiodarone (CORDARONE) tablet 400 mg  400 mg Oral Q12H  
 famotidine (PEPCID) tablet 20 mg  20 mg Oral DAILY  nitroglycerin (NITROBID) 2 % ointment 1 Inch  1 Inch Topical TID  ondansetron (ZOFRAN) injection 4 mg  4 mg IntraVENous Q6H PRN  
 busPIRone (BUSPAR) tablet 10 mg  10 mg Oral TID  morphine 10 mg/ml injection 5 mg  5 mg IntraVENous Q4H PRN  
 oxyCODONE-acetaminophen (PERCOCET) 5-325 mg per tablet 1 Tab  1 Tab Per NG tube Q4H PRN  
 traMADol (ULTRAM) tablet 50 mg  50 mg Oral Q6H PRN  
 epoetin maritza-epbx (RETACRIT) 14,000 Units combo injection  14,000 Units SubCUTAneous Q7D  
 argatroban 50 mg in 0.9% sodium chloride 50 mL (1000 mcg/mL) infusion  0.5-10 mcg/kg/min IntraVENous TITRATE  alcohol 62% (NOZIN) nasal  1 Ampule  1 Ampule Topical Q12H  
 NUTRITIONAL SUPPORT ELECTROLYTE PRN ORDERS   Does Not Apply PRN  
 acetaminophen (TYLENOL) solution 650 mg  650 mg Per NG tube Q4H PRN  
 sodium chloride (NS) flush 5-40 mL  5-40 mL IntraVENous Q8H  
 sodium chloride (NS) flush 5-40 mL  5-40 mL IntraVENous PRN  
 naloxone (NARCAN) injection 0.4 mg  0.4 mg IntraVENous PRN  
 [Held by provider] metoprolol tartrate (LOPRESSOR) tablet 25 mg  25 mg Oral Q12H  
 atorvastatin (LIPITOR) tablet 80 mg  80 mg Oral QHS  dextrose (D50W) injection syrg 12.5 g  25 mL IntraVENous PRN  
 [Held by provider] aspirin chewable tablet 81 mg  81 mg Oral DAILY  magnesium sulfate 1 g/100 ml IVPB (premix or compounded)  1 g IntraVENous PRN  
 sodium bicarbonate (8.4%) injection 50 mEq  50 mEq IntraVENous PRN Other Studies (last 24 hours): Xr Chest [place] [facility] Result Date: 2/2/2020 Portable chest xray  COMPARISON: January 31, 2020 CLINICAL HISTORY: Status post CABG FINDINGS: Sternotomy wires, right-sided cardiac pacer and left-sided subclavian catheter are stable. There is vascular congestion. There is left basilar opacity, likely atelectasis or consolidation. No pneumothorax. Cardiomediastinal contour and the surrounding bones are stable. IMPRESSION: 1. Left basilar opacity, likely atelectasis or consolidation. Vascular congestion. 2. No significant change. Assessment and Plan:  
 
Hospital Problems as of 2/2/2020 Date Reviewed: 12/6/2019 Codes Class Noted - Resolved POA  
 HIT (heparin-induced thrombocytopenia) ([facility]) ICD-10-CM: P65.83 ICD-9-CM: 289.84  1/23/2020 - Present Unknown Bilateral pneumothorax ICD-10-CM: J93.9 ICD-9-CM: 512.89  1/17/2020 - Present Unknown Thrombocytopenia ([facility] [address] ICD-10-CM: D69.6 ICD-9-CM: 287.5  1/17/2020 - Present Unknown Shock ([facility] ICD-10-CM: R57.9 ICD-9-CM: 785.50  1/15/2020 - Present Unknown Atrial fibrillation [facility]) ICD-10-CM: I48.91 
ICD-9-CM: 427.31  1/13/2020 - Present Unknown Acute renal failure (ARF) ([facility]) ICD-10-CM: N17.9 ICD-9-CM: 584.9  1/11/2020 - Present Unknown Aortic valve stenosis ICD-10-CM: I35.0 ICD-9-CM: 424.1  1/10/2020 - Present Unknown CAD (coronary artery disease) ICD-10-CM: I25.10 ICD-9-CM: 414.00  1/10/2020 - Present Unknown Weaning from respirator [facility]) ICD-10-CM: Z99.11 ICD-9-CM: V46.13  1/10/2020 - Present Unknown  Acute hypoxemic respiratory failure (HCC) ICD-10-CM: J96.01 
 ICD-9-CM: 518.81  1/10/2020 - Present * (Principal) S/P CABG x 3 ICD-10-CM: Z95.1 ICD-9-CM: V45.81  1/10/2020 - Present Unknown S/P AVR ICD-10-CM: Z95.2 ICD-9-CM: V43.3  1/10/2020 - Present Unknown DM type 2 (diabetes mellitus, type 2) (HCC) ICD-10-CM: E11.9 ICD-9-CM: 250.00  1/14/2013 - Present Yes RESOLVED: Metabolic acidosis [id number]: E87.2 ICD-9-CM: 276.2  1/11/2020 - 1/16/2020 Unknown RESOLVED: Hyperkalemia ICD-10-CM: E87.5 ICD-9-CM: 276.7  1/11/2020 - 1/16/2020 Unknown PLAN:   
 -Diabetes mellitus type 2-continue sliding scale, will add small dose of Lantus at bedtime 
-Acute hypoxic respiratory failure-on oxygen nasal cannula 6 L/min, pulmonary following HIT-on argatroban 
-Status post CABG and AVR 
-Ischemic toes- 
-DEJUAN on dialysis 
-Wound VAC to left thigh medial aspect 
  
[facility], full code. Will follow.  
 
Signed: 
Radha Bermudez MD

## 2020-02-02 NOTE — PROGRESS NOTES
Critical Care Daily Progress Note: 2/2/2020 Jovani Hess Admission Date: 1/10/2020 The patient's chart is reviewed and the patient is discussed with the staff. Patient had CABG x 3 and AVR on 1/10.  Slow to improve. Had small ptx that self resolved. 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 Subjective:  
Currently on 6 L, sleeping,  Had hd yesterday Current Facility-Administered Medications Medication Dose Route Frequency  insulin lispro (HUMALOG) injection   SubCUTAneous AC&HS  
 albuterol-ipratropium (DUO-NEB) 2.5 MG-0.5 MG/3 ML  3 mL Nebulization Q4H PRN  
 insulin glargine (LANTUS) injection 10 Units  10 Units SubCUTAneous QHS  albuterol-ipratropium (DUO-NEB) 2.5 MG-0.5 MG/3 ML  3 mL Nebulization QID RT  
 amiodarone (CORDARONE) tablet 400 mg  400 mg Oral Q12H  
 famotidine (PEPCID) tablet 20 mg  20 mg Oral DAILY  nitroglycerin (NITROBID) 2 % ointment 1 Inch  1 Inch Topical TID  ondansetron (ZOFRAN) injection 4 mg  4 mg IntraVENous Q6H PRN  
 busPIRone (BUSPAR) tablet 10 mg  10 mg Oral TID  morphine 10 mg/ml injection 5 mg  5 mg IntraVENous Q4H PRN  
 oxyCODONE-acetaminophen (PERCOCET) 5-325 mg per tablet 1 Tab  1 Tab Per NG tube Q4H PRN  
 traMADol (ULTRAM) tablet 50 mg  50 mg Oral Q6H PRN  
 epoetin maritza-epbx (RETACRIT) 14,000 Units combo injection  14,000 Units SubCUTAneous Q7D  
 argatroban 50 mg in 0.9% sodium chloride 50 mL (1000 mcg/mL) infusion  0.5-10 mcg/kg/min IntraVENous TITRATE  alcohol 62% (NOZIN) nasal  1 Ampule  1 Ampule Topical Q12H  
 NUTRITIONAL SUPPORT ELECTROLYTE PRN ORDERS   Does Not Apply PRN  
 acetaminophen (TYLENOL) solution 650 mg  650 mg Per NG tube Q4H PRN  
 sodium chloride (NS) flush 5-40 mL  5-40 mL IntraVENous Q8H  
  sodium chloride (NS) flush 5-40 mL  5-40 mL IntraVENous PRN  
 naloxone (NARCAN) injection 0.4 mg  0.4 mg IntraVENous PRN  
 [Held by provider] metoprolol tartrate (LOPRESSOR) tablet 25 mg  25 mg Oral Q12H  
 atorvastatin (LIPITOR) tablet 80 mg  80 mg Oral QHS  dextrose (D50W) injection syrg 12.5 g  25 mL IntraVENous PRN  
 [Held by provider] aspirin chewable tablet 81 mg  81 mg Oral DAILY  magnesium sulfate 1 g/100 ml IVPB (premix or compounded)  1 g IntraVENous PRN  
 sodium bicarbonate (8.4%) injection 50 mEq  50 mEq IntraVENous PRN Review of Systems Constitutional:  negative for fever, chills, sweats Cardiovascular:  negative for chest pain, palpitations, syncope, edema Gastrointestinal:  negative for dysphagia, reflux, vomiting, diarrhea, abdominal pain, or melena Neurologic:  negative for focal weakness, numbness, headache Objective:  
 
Vitals:  
 02/02/20 0300 02/02/20 0400 02/02/20 0500 02/02/20 0600 BP:      
Pulse: 66 66 70 65 Resp: 19 18 29 21 Temp:      
SpO2: 95% 100% 100% 99% Weight:      
Height:      
 
 
 
Intake/Output Summary (Last 24 hours) at 2/2/2020 0645 Last data filed at 2/2/2020 9454 Gross per 24 hour Intake 1578.24 ml Output 4075 ml Net -2496.76 ml Physical Exam:         
Constitutional:  the patient is well developed and in no acute distress EENMT:  Sclera clear, pupils equal, oral mucosa moist 
Respiratory: clear Cardiovascular:  RRR without M,G,R 
Gastrointestinal: soft and non-tender; with positive bowel sounds. Musculoskeletal: warm without cyanosis. There is 1+ lower extremity edema. Skin:  no jaundice or rashes, sternla wounds Neurologic: no gross neuro deficits Psychiatric:  sleeping LINES: 
dialysis DRIPS: 
argatraban CXR: ? Left effusion LAB Recent Labs 02/02/20 
6868 02/01/20 
2038 02/01/20 
1636 02/01/20 
1118 02/01/20 
6516 GLUCPOC 180* 253* 153* 249* 158* Recent Labs 02/02/20 6727 02/01/20 
5912 01/31/20 
7356 WBC 10.3 11.7* 12.0* HGB 8.2* 8.1* 8.1* HCT 27.2* 26.4* 26.3*  
* 117* 130* Recent Labs 02/02/20 
0048 02/01/20 
6121 01/31/20 
7388  138 135* K 4.6 4.4 4.4  102 99 CO2 28 29 28 * 136* 180* BUN 44* 49* 58* CREA 4.86* 5.14* 5.81* MG  --   --  2.3 PHOS  --   --  5.6*  
CA 8.1* 8.1* 8.0* No results for input(s): PH, PCO2, PO2, HCO3, PHI, PCO2I, PO2I, HCO3I in the last 72 hours. No results for input(s): LCAD, LAC in the last 72 hours. No results for input(s): PH, PCO2, PO2, HCO3, PHI, PCO2I, PO2I, HCO3I in the last 72 hours. Patient Active Problem List  
Diagnosis Code  DM type 2 (diabetes mellitus, type 2) ([facility]) E11.9  
 Gout M10.9  
 HTN (hypertension) I10  
 BPH (benign prostatic hyperplasia) N40.0  Seasonal allergic rhinitis J30.2  
 HLD (hyperlipidemia) E78.5  Nonrheumatic aortic valve stenosis I35.0  Obesity, morbid ([facility] [address] E66.01  
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  
 Bilateral carotid artery stenosis I65.23  
 Aortic valve stenosis I35.0  
 CAD (coronary artery disease) I25.10  Weaning from respirator [facility]) Z99.11  
 Acute hypoxemic respiratory failure ([facility]) J96.01  
 S/P CABG x 3 Z95.1  
 S/P AVR Z95.2  Acute renal failure (ARF) ([facility]) N17.9  Atrial fibrillation ([facility] [address] I48.91  Shock ([facility] R57.9  Bilateral pneumothorax J93.9  Thrombocytopenia ([facility]) D69.6  
 HIT (heparin-induced thrombocytopenia) ([facility]) D75.82 Assessment:  (Medical Decision Making) Hospital Problems  Date Reviewed: 12/6/2019 Codes Class Noted POA  
 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  
 argatraban Bilateral pneumothorax ICD-10-CM: J93.9 ICD-9-CM: 512.89  1/17/2020 Unknown Thrombocytopenia ([facility] ICD-10-CM: D69.6 ICD-9-CM: 287.5  1/17/2020 Unknown [address] Shock ([facility] ICD-10-CM: R57.9 ICD-9-CM: 785.50  1/15/2020 Unknown Atrial fibrillation [facility]) ICD-10-CM: I48.91 
ICD-9-CM: 427.31  1/13/2020 Unknown Acute renal failure (ARF) ([facility]) ICD-10-CM: N17.9 ICD-9-CM: 584.9  1/11/2020 Unknown  
 dialysis Aortic valve stenosis ICD-10-CM: I35.0 ICD-9-CM: 424.1  1/10/2020 Unknown CAD (coronary artery disease) ICD-10-CM: I25.10 ICD-9-CM: 414.00  1/10/2020 Unknown Weaning from respirator [facility]) ICD-10-CM: Z99.11 ICD-9-CM: V46.13  1/10/2020 Unknown Acute hypoxemic respiratory failure ([facility]) ICD-10-CM: J96.01 
ICD-9-CM: 518.81  1/10/2020   
 6 L  
 * (Principal) S/P CABG x 3 ICD-10-CM: Z95.1 ICD-9-CM: V45.81  1/10/2020 Unknown S/P AVR ICD-10-CM: Z95.2 ICD-9-CM: V43.3  1/10/2020 Unknown DM type 2 (diabetes mellitus, type 2) ([facility]) ICD-10-CM: E11.9 ICD-9-CM: 250.00  1/14/2013 Yes Plan:  (Medical Decision Making) 1   ? Left effusion- ultrasound, may need thora 2   Wean O2 
3   Needs permacath for dialysis -- 
 
More than 50% of the time documented was spent in face-to-face contact with the patient and in the care of the patient on the floor/unit where the patient is located.  
 
Debbie Rodriguez MD

## 2020-02-03 PROBLEM — J90 PLEURAL EFFUSION: Status: ACTIVE | Noted: 2020-01-01

## 2020-02-03 NOTE — WOUND CARE
Patient seen for wound VAC dressing change to left thigh proximal 2 incisions. Incisions approximated. Noted serous drainage in canister. No weeping noted. Bilateral toes remain deep purple. Will continue present cares for now.

## 2020-02-03 NOTE — PROGRESS NOTES
Bilateral foot/toe dressing changed at this time. All toes wrapped in xeroform gauze and 4x4's placed on top. Toes/feet then wrapped in kerlix.

## 2020-02-03 NOTE — PROGRESS NOTES
02/02/20 2124 Oxygen Therapy O2 Sat (%) 98 % Pulse via Oximetry 72 beats per minute O2 Device Hi flow nasal cannula O2 Flow Rate (L/min) 5 l/min Attempt made for pt to wear home CPAP with supplemental O2 and patient states it \"feels like it's pounding him\" and he is unable to tolerate wearing his CPAP. Returned to [facility] at 5 l/m.

## 2020-02-03 NOTE — PROGRESS NOTES
Angelo Woodruff Admission Date: 1/10/2020 Daily Progress Note: 2/3/2020 The patient's chart is reviewed and the patient is discussed with the staff. CABG x 3 and AVR on 1/10.  Slow to improve. Had small ptx that self resolved. 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 Subjective:  
Down to 4L O2, still on argatraban Current Facility-Administered Medications Medication Dose Route Frequency  insulin lispro (HUMALOG) injection   SubCUTAneous AC&HS  
 albuterol-ipratropium (DUO-NEB) 2.5 MG-0.5 MG/3 ML  3 mL Nebulization Q4H PRN  
 insulin glargine (LANTUS) injection 10 Units  10 Units SubCUTAneous QHS  albuterol-ipratropium (DUO-NEB) 2.5 MG-0.5 MG/3 ML  3 mL Nebulization QID RT  
 amiodarone (CORDARONE) tablet 400 mg  400 mg Oral Q12H  
 famotidine (PEPCID) tablet 20 mg  20 mg Oral DAILY  nitroglycerin (NITROBID) 2 % ointment 1 Inch  1 Inch Topical TID  ondansetron (ZOFRAN) injection 4 mg  4 mg IntraVENous Q6H PRN  
 busPIRone (BUSPAR) tablet 10 mg  10 mg Oral TID  morphine 10 mg/ml injection 5 mg  5 mg IntraVENous Q4H PRN  
 oxyCODONE-acetaminophen (PERCOCET) 5-325 mg per tablet 1 Tab  1 Tab Per NG tube Q4H PRN  
 traMADol (ULTRAM) tablet 50 mg  50 mg Oral Q6H PRN  
 epoetin maritza-epbx (RETACRIT) 14,000 Units combo injection  14,000 Units SubCUTAneous Q7D  
 argatroban 50 mg in 0.9% sodium chloride 50 mL (1000 mcg/mL) infusion  0.5-10 mcg/kg/min IntraVENous TITRATE  alcohol 62% (NOZIN) nasal  1 Ampule  1 Ampule Topical Q12H  
 NUTRITIONAL SUPPORT ELECTROLYTE PRN ORDERS   Does Not Apply PRN  
 acetaminophen (TYLENOL) solution 650 mg  650 mg Per NG tube Q4H PRN  
 sodium chloride (NS) flush 5-40 mL  5-40 mL IntraVENous Q8H  
 sodium chloride (NS) flush 5-40 mL  5-40 mL IntraVENous PRN  
  naloxone (NARCAN) injection 0.4 mg  0.4 mg IntraVENous PRN  
 [Held by provider] metoprolol tartrate (LOPRESSOR) tablet 25 mg  25 mg Oral Q12H  
 atorvastatin (LIPITOR) tablet 80 mg  80 mg Oral QHS  dextrose (D50W) injection syrg 12.5 g  25 mL IntraVENous PRN  
 [Held by provider] aspirin chewable tablet 81 mg  81 mg Oral DAILY  magnesium sulfate 1 g/100 ml IVPB (premix or compounded)  1 g IntraVENous PRN  
 sodium bicarbonate (8.4%) injection 50 mEq  50 mEq IntraVENous PRN Review of Systems Constitutional: negative for fever, chills, sweats Cardiovascular: negative for chest pain, palpitations, syncope, edema Gastrointestinal:  negative for dysphagia, reflux, vomiting, diarrhea, abdominal pain, or melena Neurologic:  negative for focal weakness, numbness, headache Objective:  
 
Vitals:  
 02/03/20 0500 02/03/20 0543 02/03/20 0600 02/03/20 0630 BP:      
Pulse: 67  64 63 Resp: 30  21 18 Temp:      
SpO2: 97%  99% 99% Weight:  283 lb (128.4 kg) Height:      
 
 
 
Intake/Output Summary (Last 24 hours) at 2/3/2020 9889 Last data filed at 2/3/2020 0815 Gross per 24 hour Intake 1273.28 ml Output 175 ml Net 1098.28 ml Physical Exam:  
Constitution:  the patient is well developed and in no acute distress EENMT:  Sclera clear, pupils equal, oral mucosa moist 
Respiratory: decreased breath sounds on the left Cardiovascular:  RRR without M,G,R 
Gastrointestinal: soft and non-tender; with positive bowel sounds. Musculoskeletal: warm without cyanosis. There is 1= lower extremity edema. Skin:  no jaundice or rashes, sternal wounds Neurologic: no gross neuro deficits Psychiatric:  alert and responsive CXR:  
 
 
LAB Recent Labs 02/03/20 
0547 02/02/20 
2029 02/02/20 
1600 02/02/20 
1133 02/02/20 
0547 GLUCPOC 218* 301* 183* 201* 180* Recent Labs 02/03/20 
3910 02/02/20 
4184 02/01/20 
1198 WBC 9.9 10.3 11.7* HGB 8.0* 8.2* 8.1*  
 HCT 26.0* 27.2* 26.4*  
* 114* 117* Recent Labs 02/03/20 
4012 02/02/20 
8820 02/01/20 
5188 * 136 138  
K 4.8 4.6 4.4  
CL 99 100 102 CO2 29 28 29 * 221* 136* BUN 57* 44* 49* CREA 5.97* 4.86* 5.14* MG 2.4  --   --   
CA 8.1* 8.1* 8.1*  
PHOS 5.5*  --   -- No results for input(s): PH, PCO2, PO2, HCO3, PHI, PCO2I, PO2I, HCO3I in the last 72 hours. No results for input(s): LCAD, LAC in the last 72 hours. Assessment:  (Medical Decision Making) Hospital Problems  Date Reviewed: 12/6/2019 Codes Class Noted POA Pleural effusion ICD-10-CM: J90 ICD-9-CM: 511.9  2/3/2020 Unknown HIT (heparin-induced thrombocytopenia) ([facility]) ICD-10-CM: Z62.76 ICD-9-CM: 289.84  1/23/2020 Unknown Bilateral pneumothorax ICD-10-CM: J93.9 ICD-9-CM: 512.89  1/17/2020 Unknown Thrombocyt[facility] ICD-10-CM: D69.6 ICD-9-CM: 287.5  1/17/2020 Unknown Shock ([facility] ICD-10-CM: R57.9 ICD-9-CM: 785.50  1/15/2020 Unknown Atrial fibrillation [facility]) ICD-10-CM: I48.91 
ICD-9-CM: 427.31  1/13/2020 Unknown Acute renal failure (ARF) ([facility]) ICD-10-CM: N17.9 ICD-9-CM: 584.9  1/11/2020 Unknown Aortic valve stenosis ICD-10-CM: I35.0 ICD-9-CM: 424.1  1/10/2020 Unknown CAD (coronary artery disease) ICD-10-CM: I25.10 ICD-9-CM: 414.00  1/10/2020 Unknown Weaning from respirator [facility]) ICD-10-CM: Z99.11 ICD-9-CM: V46.13  1/10/2020 Unknown Acute hypoxemic respiratory failure ([facility] ICD-10-CM: J96.01 
ICD-9-CM: 518.81  1/10/2020 * (Principal) S/P CABG x 3 ICD-10-CM: Z95.1 ICD-9-CM: V45.81  1/10/2020 Unknown S/P AVR ICD-10-CM: Z95.2 ICD-9-CM: V43.3  1/10/2020 Unknown DM type 2 (diabetes mellitus, type 2) ([facility]) ICD-10-CM: E11.9 ICD-9-CM: 250.00  1/14/2013 Yes Plan:  (Medical Decision Making) 1    Ultrasound left hemithorax-may need thoracentesis 2    ? permacath 3    Move to floor soon 4  ? Change over to coumadin soon 
-- 
 
More than 50% of the time documented was spent in face-to-face contact with the patient and in the care of the patient on the floor/unit where the patient is located.  
 
Ruth Abrams MD

## 2020-02-03 NOTE — PROGRESS NOTES
Physical therapy note: Attempted PT this AM. Pt dialyzing. Will continue to treat as pt is able and time/schedule permit.  
 
Ismael Barker, PTA

## 2020-02-03 NOTE — PROGRESS NOTES
Pt sat up on side of bed for ultrasound. NO fluid shown on review. MD notified. Pictures on chart. Pt back in bed. VSS. No distress noted.

## 2020-02-03 NOTE — PROGRESS NOTES
[facility] Nephrology Progress Note Follow-Up on: DEJUAN/CKD ROS: 
Gen - no fever, no chills, appetite unchanged CV - no chest pain, no palpitation Lung - + shortness of breath, no cough Abd - no tenderness, no nausea/vomiting, no diarrhea Ext - + edema Exam: 
Vitals:  
 02/03/20 0700 02/03/20 0754 02/03/20 0810 02/03/20 0830 BP:  110/57 110/57 125/57 Pulse: 63 66 62 69 Resp: 22 15 Temp: 97.2 °F (36.2 °C) SpO2: 99% 97% Weight:      
Height:      
 
 
 
Intake/Output Summary (Last 24 hours) at 2/3/2020 4602 Last data filed at 2/3/2020 0700 Gross per 24 hour Intake 1273.28 ml Output 175 ml Net 1098.28 ml Wt Readings from Last 3 Encounters:  
02/03/20 128.4 kg (283 lb) 01/08/20 136.6 kg (301 lb 4 oz) 12/23/19 138.8 kg (306 lb) GEN - in no distress CV - regular, no murmur, no rub Lung - rales bilaterally Abd - soft, nontender Ext - 1+ edema Recent Labs 02/03/20 
7091 02/02/20 
1834 02/01/20 
2902 WBC 9.9 10.3 11.7* HGB 8.0* 8.2* 8.1* HCT 26.0* 27.2* 26.4*  
* 114* 117* Recent Labs 02/03/20 
8210 02/02/20 
3579 02/01/20 
1065 * 136 138  
K 4.8 4.6 4.4  
CL 99 100 102 CO2 29 28 29 BUN 57* 44* 49* CREA 5.97* 4.86* 5.14* CA 8.1* 8.1* 8.1*  
* 221* 136* MG 2.4  --   --   
PHOS 5.5*  --   --   
 
 
Assessment / Plan: 
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 
  DM type 2 (diabetes mellitus, type 2) ([facility] (1/14/2013) Aortic valve stenosis (1/10/2020) CAD (coronary artery disease) (1/10/2020) Weaning from respirator [facility]) (1/10/2020) Acute hypoxemic respiratory failure ([address] (1/10/2020) S/P AVR (1/10/2020) Acute renal failure (ARF) ([address] (1/11/2020) Atrial fibrillation ([address] (1/13/2020) Shock ([address] (1/15/2020) Bilateral pneumothorax (1/17/2020) Thrombocytopenia ([address] (1/17/2020) HIT (heparin-induced thrombocytopenia) ([address] (1/23/2020) Pleural effusion (2/3/2020) 1. DEJUAN/CKD - Previous baseline Cr around 1.8-1.9 
- No signs of renal recovery - Seen on HD for clearance and UF 
- Will order for PC this week 2. S/p CABG 3. Volume overload 4. HIT+ - confirmed with SHARON

## 2020-02-03 NOTE — DIALYSIS
Hemodialysis treatment completed without complications. Patient caox2 and VS stable  /57  P 86   
 
 3.7 Kgs removed. Flushed both ports with 10 mL of NS.  CVC dressing clean, dry, and intact, tego caps intact, bilateral lumens wrapped with 4x4 gauze. Patient remains in room.

## 2020-02-03 NOTE — PROGRESS NOTES
POC provided to patient. 24 hour chart check done. Patient bathed and turned with new allevyn/zinc paste to sacral area, no change in sites' appearance.

## 2020-02-03 NOTE — PROGRESS NOTES
Care Management Interventions PCP Verified by CM: Yes([facility]) Mode of Transport at Discharge: Other (see comment)(TBD based on need) Transition of Care Consult (CM Consult): Discharge Planning Discharge Durable Medical Equipment: No 
Physical Therapy Consult: Yes Occupational Therapy Consult: Yes Speech Therapy Consult: No 
Current Support Network: Own Home, Lives with Spouse Confirm Follow Up Transport: Other (see comment)(TBD based on need) The Plan for Transition of Care is Related to the Following Treatment Goals : unable to determine at this time The Patient and/or Patient Representative was Provided with a Choice of Provider and Agrees with the Discharge Plan?: (N/A) Name of the Patient Representative Who was Provided with a Choice of Provider and Agrees with the Discharge Plan: (N/A) Freedom of Choice List was Provided with Basic Dialogue that Supports the Patient's Individualized Plan of Care/Goals, Treatment Preferences and Shares the Quality Data Associated with the Providers?: (N/A) Discharge Location Discharge Placement: Unable to determine at this time This CM met with pt at bedside this day to complete assessment. Pt verified his PCP, insurance, emergency contact, and home address. He reports no difficulty obtaining his medications in the community. He lives at home with spouse with 2 steps to enter. His home DME includes a cane and walker. He confirms that prior to admission at baseline he is independent with his ADLs including bathing, dressing, cooking, and driving. Discharge plans pending. Per PT/OT notes, they recommend SNF for STR. Will address discharge planning with pt and spouse. No additional CM needs at this time. Will continue to follow.

## 2020-02-03 NOTE — PROGRESS NOTES
[address] Hematology Oncology Inpatient Hematology / Oncology Progress Note Admission Date: 1/10/2020  4:59 AM 
Reason for Admission/Hospital Course: Atherosclerosis of native coronary artery of native heart with unstable angina pectoris ([address] [I25.110] Nonrheumatic aortic valve stenosis [I35.0] CAD (coronary artery disease) [I25.10] Aortic valve stenosis [I35.0] 24 Hour Events: HIT and SHARON + On Argatroban Plt up to 136k Can transition to Coumadin once procedures completed. ROS: 
Constitutional: +fatigue. Negative fever, chills. CV: Negative for chest pain, palpitations, +edema. Respiratory:  +dyspnea, on hiflow oxygen GI: Negative for abdominal pain, diarrhea, nausea. 10 point review of systems is otherwise negative with the exception of the elements mentioned above in the HPI. Allergies Allergen Reactions  Heparin Other (comments) HIT antibody test strongly positive on 2020. SHARON drawn 2020 and resulted positive on 2020  Amoxicillin Rash  Keflex [Cephalexin] Rash  Pcn [Penicillins] Other (comments) \"felt closed in\". No rash OBJECTIVE: 
Patient Vitals for the past 8 hrs: 
 BP Temp Pulse Resp SpO2 Weight  
20 1030 110/54  67     
20 1029    20 99 %   
20 1000 121/56  73 22 97 %   
20 0930 125/56  66     
20 0929    15 97 %   
20 0900 117/58  63 17 100 %   
20 0850     95 %   
20 0831 125/57  71 26 96 %   
20 0830 125/57  69     
20 0810 110/57  62     
20 0754 110/57  66 15 97 %   
20 0700  97.2 °F (36.2 °C) 63 22 99 %   
20 0630   63 18 99 %   
20 0600   64 21 99 %   
20 0543      283 lb (128.4 kg) 20 0500   67 30 97 %   
20 0400   67 21 98 %  Temp (24hrs), Av.9 °F (36.6 °C), Min:97 °F (36.1 °C), Max:98.6 °F (37 °C) No intake/output data recorded. Physical Exam: 
Constitutional: Acutely ill-appearing elderly male in no acute distress, lying comfortably in the hospital bed. HEENT: Normocephalic and atraumatic. Skin Warm and dry. Finger tips and toes dusky bilaterally Respiratory Clear anteriorly CVS Normal rate, regular rhythm Abdomen Soft, nontender and nondistended, normoactive bowel sounds. Neuro Alert and oriented MSK 1+ BLE pitting edema. Psych Appropriate Labs: 
   
Recent Labs 02/03/20 
3046 02/02/20 
9080 02/01/20 
6313 WBC 9.9 10.3 11.7*  
RBC 2.68* 2.80* 2.71* HGB 8.0* 8.2* 8.1* HCT 26.0* 27.2* 26.4*  
MCV 97.0 97.1 97.4 MCH 29.9 29.3 29.9 MCHC 30.8* 30.1* 30.7* RDW 15.0* 15.1* 15.2*  
* 114* 117* Recent Labs 02/03/20 
6711 02/02/20 
3767 02/01/20 
1153 * 136 138  
K 4.8 4.6 4.4  
CL 99 100 102 CO2 29 28 29 AGAP 6* 8 7 * 221* 136* BUN 57* 44* 49* CREA 5.97* 4.86* 5.14* GFRAA 12* 15* 14* GFRNA 10* 13* 12*  
CA 8.1* 8.1* 8.1*  
MG 2.4  --   --   
PHOS 5.5*  --   --   
 
 
 
Imaging: XR CHEST SNGL V [[id number]] Collected: 01/19/20 0528 Order Status: Completed Updated: 01/19/20 5412 Narrative:    
EXAM: XR CHEST SNGL V 
 
INDICATION: Coronary artery disease COMPARISON: 1/18/2020 FINDINGS: A portable AP radiograph of the chest was obtained at 0334 hours. The 
patient is on a cardiac monitor. Bibasilar airspace disease. Worse in the 
interval. The cardiac and mediastinal contours and pulmonary vascularity are 
normal.  The bones and soft tissues are grossly within normal limits. Impression:    
IMPRESSION: Findings most consistent with pulmonary edema worse in the interval.  
XR CHEST PORT [[id number]] Collected: 01/18/20 0502 Order Status: Completed Updated: 01/18/20 7891 Narrative:    
EXAM: XR CHEST PORT INDICATION: Coronary artery disease COMPARISON: 1/17/2020 FINDINGS: A portable AP radiograph of the chest was obtained at 0500 hours.  The 
 patient is on a cardiac monitor. Bibasilar airspace disease improved. The 
cardiac and mediastinal contours and pulmonary vascularity are normal.  The 
bones and soft tissues are grossly within normal limits. Impression:    
IMPRESSION: Bibasilar atelectasis improved. XR CHEST SNGL V [[id number]] Collected: 01/17/20 1009 Order Status: Completed Updated: 01/17/20 1024 Narrative:    
CHEST X-RAY, one view. HISTORY:  Pneumothorax status post open heart surgery, follow-up. TECHNIQUE:  AP upright portable view COMPARISON: Yesterday's exam 
 
FINDINGS:    
Small right apical pneumothorax is even smaller. Increased atelectasis or 
infiltrate at both bases. ET tube dialysis type catheter introducer sheath and 
sternal wires remain. Impression:    
IMPRESSION:  Decreased pneumothorax. Increased atelectasis or infiltrate at both 
bases. XR CHEST SNGL V [[id number]] Order Status: Canceled XR CHEST SNGL V [[id number]] Collected: 01/16/20 0427 Order Status: Completed Updated: 01/16/20 4210 Narrative:    
Clinical history: Post chest tube removal. 
 
TECHNIQUE: AP portable chest 
 
COMPARISON: Yesterday. FINDINGS: 
 
Previous left-sided chest tube is no longer seen. Endotracheal and enteric tubes 
and left-sided subclavian catheter are stable. Right [place]-Nallely catheter has been 
removed. Right IJ sheath is still present. Postsurgical changes of sternotomy. There is vascular congestion. Bibasilar opacities, likely atelectasis. There is 
suggestion of small right apical pneumothorax. There is tiny left apical 
pneumothorax. Impression:    
IMPRESSION: 
 
1. Previous left chest tube is no longer seen. Tiny right apical pneumothorax. 2. Suggestion of small right apical pneumothorax. 3. Bibasilar atelectasis and vascular congestion, similar to prior. XR CHEST SNGL V [[id number]] Collected: 01/15/20 4042 Order Status: Completed Updated: 01/15/20 5554 Narrative:    
  Portable view of the chest  
 
COMPARISON: Yesterday. CLINICAL HISTORY: Postop. FINDINGS: 
 
Stable postsurgical changes of sternotomy. Endotracheal tube, right [place]-Nallely 
catheter and left chest tube are stable. Enteric tube is not well-seen. Bibasilar opacities, likely atelectasis. There is vascular congestion. No 
pneumothorax. Cardiomediastinal contour and the surrounding bones are stable. Impression:    
IMPRESSION: 
 
1. Stable post op findings. No significant change in the appearance of the 
lungs. No pneumothorax. XR CHEST SNGL V [[id number]] Collected: 01/14/20 0610 Order Status: Completed Updated: 01/14/20 0632 Narrative:    
 Portable view of the chest  
 
COMPARISON: Yesterday. CLINICAL HISTORY: Postop. FINDINGS: 
 
Stable postsurgical changes of sternotomy. Endotracheal tube, enteric tube, 
right-sided [place]-Nallely catheter and left chest tube are stable. There is stable 
left subclavian catheter. Cardiac mediastinal contour and the surrounding bones 
are stable. No pneumothorax. There is vascular congestion. Bibasilar opacities, 
likely atelectasis. Impression:    
IMPRESSION: 
 
1. Stable post op findings. ET tube tip is in good position. 2. Vascular congestion and bibasilar atelectasis, similar to prior exam.  
XR CHEST SNGL V [[id number]] Collected: 01/13/20 0650 Order Status: Completed Updated: 01/13/20 5175 Narrative:    
 Portable view of the chest  
 
COMPARISON: Yesterday CLINICAL HISTORY: Postop, follow-up. FINDINGS: 
 
Endotracheal tube, enteric tube, right-sided [place]-Nallely catheter, and left chest 
tube are stable. Cardiac silhouette is enlarged, similar to prior exam. 
Bibasilar opacities, likely atelectasis. There is vascular congestion. No 
pneumothorax. Left-sided subclavian catheter is stable. Impression:    
IMPRESSION: 
 
1. Stable post op findings. ET tube tip is in good position. 2. Bibasilar atelectasis and vascular congestion. No pneumothorax. 3. No significant change compared to prior exam.  
XR CHEST SNGL V [[id number]] Collected: 01/12/20 8868 Order Status: Completed Updated: 01/12/20 5042 Narrative:    
 Portable view of the chest  
 
COMPARISON: January 12, 2020. CLINICAL HISTORY: Subclavian line placement FINDINGS: 
 
There is a new left subclavian catheter with the tip in the area of SVC/right 
atrium junction. Right [place]-Nallely catheter, enteric tube and endotracheal tube 
and left-sided chest tube are stable. There is vascular congestion. Bibasilar 
opacities, likely atelectasis. No significant pneumothorax. Cardiomediastinal 
contour and the surrounding bones are stable. Stable postsurgical changes of 
sternotomy. Impression:    
IMPRESSION: 
 
1. New left subclavian catheter. No significant pneumothorax. 2. Vascular congestion and bibasilar atelectasis. XR CHEST SNGL V [[id number]] Collected: 01/12/20 0301 Order Status: Completed Updated: 01/12/20 4751 Narrative:    
 Portable view of the chest  
 
Clinical history: Worsening hypercapnia, on vent COMPARISON: January 11, 2020. FINDINGS: 
 
Stable postsurgical changes of sternotomy. Endotracheal tube, enteric tube, 
right-sided [place]-Nallely catheter and left-sided chest tube are stable. There is 
small right apical pneumothorax. There is vascular congestion. Bibasilar 
opacities, likely atelectasis. Cardiomediastinal contour and the surrounding 
bones are stable. Impression:    
IMPRESSION: 
 
1. Small right apical pneumothorax, appearing slightly decreased since the prior 
exam. 
 
2. Vascular congestion and bibasilar atelectasis. XR CHEST SNGL V [[id number]] Order Status: Canceled XR CHEST SNGL V [[id number]] Collected: 01/11/20 1220 Order Status: Completed Updated: 01/11/20 1231 Narrative:    
PORTABLE CHEST, January 11, 2020 at 1200 hours CLINICAL HISTORY:  Follow-up right pneumothorax. COMPARISON:  0359 hours today. FINDINGS:  AP erect image demonstrates persistent small right apical 
pneumothorax.  The heart remains enlarged with pulmonary venous congestion and 
bibasilar atelectasis status post CABG.  Endotracheal, feeding, and left chest 
tubes as well as [place]-Nallely catheter remain in place. Impression:    
IMPRESSION:  NO SIGNIFICANT INTERVAL CHANGE, INCLUDING THE SMALL RIGHT APICAL PNEUMOTHORAX. [address] [place] [address] [[id number]] Collected: 01/11/20 1122 Order Status: Completed Updated: 01/11/20 1127 Narrative:    
ULTRASOUND OF THE KIDNEYS AND BLADDER 
 
CLINICAL HISTORY:  Acute on chronic kidney disease. COMPARISON:  None. FINDINGS: The examination was limited by the patient's morbid obesity as well as 
limited mobility and ability to suspend respirations.  Multiple images from real 
time ultrasound evaluation of the kidneys show that they are normal in size and 
orientation bilaterally.  The right kidney measures 12.3 cm in length while the 
left measures 12.4 cm.  No definite solid renal mass, hydronephrosis, stone, or 
abnormal perinephric collection is seen. The bladder was not distended at the 
time of examination. Impression:    
IMPRESSION:  Unremarkable, technically limited examination. XR CHEST SNGL V [[id number]] Collected: 01/11/20 0636 Order Status: Completed Updated: 01/11/20 1808 Narrative:    
 Portable view of the chest  
 
COMPARISON: January 10, 2020 CLINICAL HISTORY: Postop. FINDINGS: 
 
Stable postsurgical changes of sternotomy. Endotracheal tube, enteric tube and 
right-sided [place]-Nallely catheter and left-sided chest tube are stable. There is 
vascular congestion. Bibasilar opacities, likely atelectasis. There is small 
right apical pneumothorax, similar to prior exam. Cardiac mediastinal contour 
and the surrounding bones are stable.   
Impression:    
IMPRESSION: 
 
 1. Stable small right apical pneumothorax. 2. Vascular congestion and bibasilar atelectasis. No change in the appearance of 
the lungs. XR CHEST SNGL V [[id number]] Collected: 01/10/20 1617 Order Status: Completed Updated: 01/10/20 1623 Narrative:    
Chest, one view, 1/10/2020 Indication:Postop cardiovascular surgery Comparison:1/8/2020 There are post median sternotomy changes. Support apparatus including [place]-Nallely 
catheter tip projects over the proximal right pulmonary artery. Endotracheal 
tube about 6 cm above the radha. There is an NG tube tip of which is not 
included on this exam. There is also left-sided chest tube. No pneumothorax. Patient appears to have had an aortic valve f replacement. There is perihilar 
atelectasis and likely a small left effusion. Impression:    
Impression: Recent median sternotomy changes with support apparatus as detailed 
above. Perihilar and likely a small left effusion. ASSESSMENT: 
 
Problem List  Date Reviewed: 12/6/2019 Codes Class Noted Pleural effusion ICD-10-CM: J90 ICD-9-CM: 511.9  2/3/2020 HIT (heparin-induced thrombocytopenia) ([facility]) ICD-10-CM: A38.15 ICD-9-CM: 289.84  1/23/2020 Bilateral pneumothorax ICD-10-CM: J93.9 ICD-9-CM: 512.89  1/17/2020 Thrombocytopenia ([facility] ICD-10-CM: D69.6 ICD-9-CM: 287.5  1/17/2020 Shock ([facility] ICD-10-CM: R57.9 ICD-9-CM: 785.50  1/15/2020 Atrial fibrillation [facility]) ICD-10-CM: I48.91 
ICD-9-CM: 427.31  1/13/2020 Acute renal failure (ARF) ([facility]) ICD-10-CM: N17.9 ICD-9-CM: 584.9  1/11/2020 Aortic valve stenosis ICD-10-CM: I35.0 ICD-9-CM: 424.1  1/10/2020 CAD (coronary artery disease) ICD-10-CM: I25.10 ICD-9-CM: 414.00  1/10/2020 Weaning from respirator [facility]) ICD-10-CM: Z99.11 ICD-9-CM: V46.13  1/10/2020 Acute hypoxemic respiratory failure ([facility] ICD-10-CM: J96.01 
ICD-9-CM: 518.81  1/10/2020 * ([facility]) S/P CABG x 3 ICD-10-CM: Z95.1 ICD-9-CM: V45.81  1/10/2020 S/P AVR ICD-10-CM: Z95.2 ICD-9-CM: V43.3  1/10/2020 Bilateral carotid artery stenosis ICD-10-CM: I65.23 ICD-9-CM: 433.10, 433.30  11/24/2019 Overview Signed 11/24/2019  8:06 PM by Rudy Marcial MD  
  1. Carotid Duplex (11/20/19): Bilateral 50-69% ICA stenosis. Type 2 diabetes with nephropathy ([facility] ICD-10-CM: E11.21 
ICD-9-CM: 250.40, 583.81  8/26/2019 Obesity, morbid ([facility] ICD-10-CM: E66.01 
ICD-9-CM: 278.01  10/19/2018 Nonrheumatic aortic valve stenosis ICD-10-CM: I35.0 ICD-9-CM: 424.1  4/13/2018 Overview Addendum 9/18/2019  6:16 PM by Rudy Marcial MD  
  1. Echo (10/15/18): EF 55-60%. Moderate to severe Aortic Stenosis. MG 31.  PG 52.  DI 0.25. 
2.  Echo (9/11/19):  EF 60-65%. Mild LAE. SEvere AS. MG 40. PG 61. DI=0.23. Mild to moderate MR. DM type 2 (diabetes mellitus, type 2) (HCC) ICD-10-CM: E11.9 ICD-9-CM: 250.00  1/14/2013 Gout ICD-10-CM: M10.9 ICD-9-CM: 274.9  1/14/2013 HTN (hypertension) ICD-10-CM: I10 
ICD-9-CM: 401.9  1/14/2013 BPH (benign prostatic hyperplasia) ICD-10-CM: N40.0 ICD-9-CM: 600.00  1/14/2013 Seasonal allergic rhinitis ICD-10-CM: J30.2 ICD-9-CM: 477.9  1/14/2013 HLD (hyperlipidemia) ICD-10-CM: E78.5 ICD-9-CM: 272.4  1/14/2013 Mr. Ashley Morgan is a 69 y.o. male admitted on 1/10/2020. His PMH includes DM, thyroid disease, prior h/o CAD with recent [facility] 12/31/19 showing severe multivessel coronary artery disease in the setting of known severe aortic stenosis. Patient was seen by CTS and set up for surgery on 1/10/2020. Patient underwent CABG x3 (LIMA>LAD, SVG>OM, SVG>PDA) and aortic valve replacement with a 25-mm Intuity Jon-Gutierres valve. He became hypoxic, hypotensive and hard to ventilate.  Pulmonary seeing patient and he remains intubated and sedated. He is also on pressors. Nephrology saw patient for worsening renal failure and CRRT was started on 1/12/2020. The patient went into AFib on 1/12 and was started on IV amiodarone. He was started on Azactam/Vanc for sepsis. CXR with increased atelectasis or infiltrate at both bases. BCx-NGTD. UCx-NG. Sputum Cx with light normal resp yessenia. On admission, WBC 16.9, Hgb 9.0, Plt 98k. On 1/17, WBC 9.3/ANC 7.6, Hgb 8.1, Plt 15k. Today platelets up to 33,133 without intervention. HIT/SHARON pending. Primary team changed heparin to argatroban. We were consulted for thrombocytopenia. 
  
PLAN: 
Thrombocytopenia / DVTs 
- Plt 98k on admission, down to 15k 
- marrow suppression from sepsis/shock likely contributing 
- HIT pending - Check DIC labs, hemolysis labs, and peripheral smear - Primary team has changed heparin to Argatroban - Transfuse plt prn 
01/18 Platelets have rebounded on their own, without transfusion to 33k today. DIC labs not impressive - repeat today for completeness. Smear and haptoglobin pending, follow. HIT/SHARON pending. As soon as HIT returns as negative, may stop argatroban as platelets are so low. His worsening thrombocytopenia is likely multifactorial related to recent clinical course, sepsis with transient marrow suppression and increased consumption, along with exposure to various medications including amiodarone.  Expect his platelets to improve over time as his clinical condition improves.  In interim he should be transfused to keep his platelets over 81,366. DMMP improving. 01/21 HIT positive, SHARON+. Platelets down to 77S today. Positive upper and lower extremity DVT's. Continue argatroban. No platelets unless bleeding. 1/27 platelets have improved without transfusion 97k today 2/3 Plt up to 136k. Remains on Argatroban, can transition to warfarin once procedures completed. 
  
Anemia - Transfuse prn to keep Hgb >8 
 - DEJUAN on CRRT / marrow suppression from sepsis/shock - Tsat 17%, ferritin 557. Check folate, B12, hemolysis labs 01/19 Continue to follow haptoglobin and smear. 1/21 Hgb stable, 8.4. No hemolysis noted. No schistocytes seen on smear. 1/27 Hgb stable 9.1. now on EPO per nephrology 
  
DEJUAN 
- Neph following, on CRRT 
  
Sepsis/Shock 
- on pressor support - On Azactam/Vanc - Cultures NGTD 
1/21 No longer on antibx. Cultures neg. Continues on pressor support. 1/27 off antibiotic. No longer requiring pressor support 
  
Hypoxia  
- intubated on vent - Pulm following 1/21 remains intubated on vent 1/27 now extubated on hiflow oxygen 
  
Afib - Cards managing 
  
CAD 
- s/p CABG 
 
1/27/2019 We were asked to revisit patient for recommendations transitioning patient from argatroban to appropriate anticoagulant. DOACs are not recommended in obese patients >120 kg due to the lack of clinical data in this population therefore we recommend using coumadin. Warfarin should be started in a patient with HIT only when both of the following have been accomplished: ?The patient has been stably anticoagulated with an alternative anticoagulant, and ? The platelet count has increased to at least 150,000/microL There should be a minimum of five days of overlapping therapy and the international normalized ratio (INR) should be in the target range before the alternative anticoagulant is discontinued. The starting dose of warfarin should be a low maintenance dose of ?5 mg/day rather than a high initial or loading dose (eg, ?10 mg/day), to minimize the transient hypercoagulable state induced by the rapid decline in protein C levels 2/3: Plt up to 136k. Can go ahead and transition to warfarin once procedures completed. Thank you for allowing us to participate in the care of Mr. Cee Diaz. We will sign off; please do not hesitate to call with any questions.  
 
 
Raine Miles NP  
 [address] Hematology Oncology [address] [place] [address] Office : [phone] Fax : [phone]

## 2020-02-03 NOTE — PROGRESS NOTES
Patient has voided x2 the normal amount received with in and out cath. Will not in and out cath patient this am in light of this.

## 2020-02-03 NOTE — PROGRESS NOTES
Patient became very sob after lying flat to turn. Resolved after sitting patient upright and calming him. Will monitor. Morphine for general discomfort.

## 2020-02-03 NOTE — PROGRESS NOTES
Problem: Self Care Deficits Care Plan (Adult) Goal: *Acute Goals and Plan of Care (Insert Text) Description 1. Patient will complete upper body bathing/dressing with SBA to improve participation with ADL tasks. 2. Patient will tolerate sitting edge of bed for 8 minutes to improve sitting tolerance for ADL. 3. Patient will complete rolling side to side in bed with supervision for positioning for ADLs. 4. Patient will attempt to stand within 3 visits to prepare for functional transfers. 5. Patient will complete grooming tasks with set-up to improve independence with ADL. 6. Patient will tolerate 30 minutes of OT activity with 2-3 rest breaks to improve activity tolerance. Timeframe: 7 visits Outcome: Progressing Towards Goal 
  
OCCUPATIONAL THERAPY: Initial Assessment, Daily Note, and PM 2/3/2020 INPATIENT: OT Visit Days: 1 Payor: Jordan Wagoner / Plan: Bruce Santana / Product Type: [employer] Medicare /  
  
NAME/AGE/GENDER: Juani Macario is a 69 y.o. male PRIMARY DIAGNOSIS:  Atherosclerosis of native coronary artery of native heart with unstable [facility] p[facility] [I25.110] Nonrheumatic aortic valve stenosis [I35.0] CAD (coronary artery disease) [I25.10] Aortic valve stenosis [I35.0] S/P CABG x 3 S/P CABG x 3 Procedure(s) (LRB): ULTRASOUND on THinners (Bilateral) Day of Surgery ICD-10: Treatment Diagnosis:  
 Generalized Muscle Weakness (M62.81) Other lack of cordination (R27.8) Precautions/Allergies: 
   Heparin; Amoxicillin; Keflex [cephalexin]; and Pcn [penicillins] ASSESSMENT:  
 
Mr. Winford Phoenix presents s/p CABG x 3. Pt has ischemic digits on the hands and feet and wound vac to his L thigh. Pt lives with his wife and states that typically he is modified independent with ADL and uses a cane for functional mobility. Pt denies any falls. Pt reports his wife has difficulty walking and he was helping her to get around the house.  Pt is anxious and complains that he is having difficulty with breathing but O2 levels stable and pt does not visibly appear short of breath. Pt demonstrates limited upper body strength and reports impaired sensation in the fingertips. Pt needs step by step instructions due to being anxious with activity. Pt did sit edge of bed with moderate assistance x 2. Pt became increasingly anxious in sitting and states he feels dizzy. Pt impulsive at times and flung himself back into the bed. Pt reports she has to use th bathroom and pt was assisted with the use of a urinal with maximal to total assistance. Pt also donned a new gown with moderate assistance and cues for placement of arms through the sleeves. Pt fairly demanding at times. Pt rolled with moderate assistance side to side for placement of corinna pad. Pt was corinna lifted to sitting up in the chair. Pt positioned with all needs at his side. Pt is currently functioning below baseline and will benefit from OT services to address stated goals and plan of care. This section established at most recent assessment PROBLEM LIST (Impairments causing functional limitations): 
Decreased Strength Decreased ADL/Functional Activities Decreased Transfer Abilities Decreased Ambulation Ability/Technique Decreased Balance Increased Pain Decreased Activity Tolerance Decreased Pacing Skills Increased Fatigue Increased Shortness of Breath Decreased Flexibility/Joint Mobility Decreased Knowledge of Precautions Decreased Skin Integrity/Hygeine Decreased [place] with Home Exercise Program 
Decreased Cognition INTERVENTIONS PLANNED: (Benefits and precautions of occupational therapy have been discussed with the patient.) Activities of daily living training Adaptive equipment training Balance training Clothing management Cognitive training Donning&doffing training Neuromuscular re-eduation Therapeutic activity Therapeutic exercise TREATMENT PLAN: Frequency/Duration: Follow patient 3 times per week to address above goals. Rehabilitation Potential For Stated Goals: Good REHAB RECOMMENDATIONS (at time of discharge pending progress):   
Placement: It is my opinion, based on this patient's performance to date, that Mr. Luther Gil may benefit from intensive therapy at a [address] [place] [address] after discharge due to the functional deficits listed above that are likely to improve with skilled rehabilitation and concerns that he/she may be unsafe to be unsupervised at home due to risk of falls and further functional decline. Equipment: TBD OCCUPATIONAL PROFILE AND HISTORY:  
History of Present Injury/Illness (Reason for Referral): 
See H&P Past Medical History/Comorbidities:  
Mr. Luther Gil  has a past medical history of Arthritis, BPH (benign prostatic hyperplasia) (1/14/2013), CAD (coronary artery disease), DM type 2 (diabetes mellitus, type 2) ([facility] (dx 2004), Dyspnea, Gout (1/14/2013), HLD (hyperlipidemia) (1/14/2013), HTN (hypertension), Morbid obesity ([facility] (9/3/14), Psychiatric disorder, Rheumatic fever, Seasonal allergic rhinitis, Severe aortic valve stenosis, Thyroid disease, and Unspecified sleep apnea (2016). Mr. Luther Gil  has a past surgical history that includes hx tonsil and adenoidectomy; hx heart catheterization (12/23/2019); hx orthopaedic (Left); and hx cataract removal (Bilateral, 2012). Social History/Living Environment:  
Home Environment: Private residence # Steps to Enter: 1 One/Two Story Residence: One story Living Alone: No 
Support Systems: Spouse/Significant Other/Partner Patient Expects to be Discharged to[de-identified] Private residence Current DME Used/Available at Home: Cane, straight, Shower chair Tub or Shower Type: Shower Prior Level of Function/Work/Activity: 
Pt lives with his wife and states that typically he is modified independent with ADL and uses a cane for functional mobility.  Pt denies any falls. Pt reports his wife has difficulty walking and he was helping her to get around the house. Personal Factors:   
      Sex:  male Age:  69 y.o. Past/Current Experience:  s/p CABG x 3 Overall Behavior:  anxious; demanding Other factors that influence how disability is experienced by the patient:  multiple co-morbidities Number of Personal Factors/Comorbidities that affect the Plan of Care: Extensive review of physical, cognitive, and psychosocial performance (3+):  HIGH COMPLEXITY ASSESSMENT OF OCCUPATIONAL PERFORMANCE[de-identified]  
Activities of Daily Living:  
Basic ADLs (From Assessment) Complex ADLs (From Assessment) Feeding: Stand-by assistance Oral Facial Hygiene/Grooming: Minimum assistance Bathing: Maximum assistance Upper Body Dressing: Moderate assistance Lower Body Dressing: Total assistance Toileting: Maximum assistance, Total assistance Instrumental ADL Meal Preparation: Total assistance Homemaking: Total assistance Grooming/Bathing/Dressing Activities of Daily Living Cognitive Retraining Safety/Judgement: Fall prevention Bed/Mat Mobility Rolling: Minimum assistance Supine to Sit: Moderate assistance;Assist x2 Sit to Supine: Moderate assistance;Assist x2 Bed to Chair: (via corinna lift) Scooting: Minimum assistance Most Recent Physical Functioning:  
Gross Assessment: 
AROM: Generally decreased, functional 
Strength: Generally decreased, functional 
Coordination: Generally decreased, functional 
Sensation: Impaired(decreased in the fingertips) Posture: 
Posture (WDL): Exceptions to [facility] Posture Assessment: Forward head Balance: 
Sitting: Impaired Sitting - Static: Fair (occasional) Sitting - Dynamic: Poor (constant support) Bed Mobility: 
Rolling: Minimum assistance Supine to Sit: Moderate assistance;Assist x2 Sit to Supine: Moderate assistance;Assist x2 Scooting: Minimum assistance Wheelchair Mobility: Transfers: 
Bed to Chair: (via corinna lift) Patient Vitals for the past 6 hrs: 
 BP BP Patient Position SpO2 O2 Flow Rate (L/min) Pulse 20 0930 125/56    66  
20 1000 121/56  97 %  73  
20 1029   99 % 20 1030 110/54    67  
20 1100 106/59 At rest 99 %  66  
20 1128   100 % 4 l/min   
20 1130 109/57    70  
20 1200 104/54  100 %  77 Mental Status Neurologic State: Alert Orientation Level: Oriented to person, Oriented to place Cognition: Follows commands Perception: Appears intact Perseveration: No perseveration noted Safety/Judgement: Fall prevention Physical Skills Involved: 
Range of Motion Balance Strength Activity Tolerance Sensation Fine Motor Control Pain (acute) Edema Skin Integrity Cognitive Skills Affected (resulting in the inability to perform in a timely and safe manner): Executive Function Sustained Attention Divided Attention Psychosocial Skills Affected: 
Habits/Routines Social Interaction Emotional Regulation Self-Awareness Number of elements that affect the Plan of Care: 5+:  HIGH COMPLEXITY CLINICAL DECISION MAKIN[address] [place] [id number] AM-PAC 6 Clicks Daily Activity Inpatient Short Form How much help from another person does the patient currently need. .. Total A Lot A Little None 1. Putting on and taking off regular lower body clothing? [x] 1   [] 2   [] 3   [] 4  
2. Bathing (including washing, rinsing, drying)? [] 1   [x] 2   [] 3   [] 4  
3. Toileting, which includes using toilet, bedpan or urinal?   [x] 1   [] 2   [] 3   [] 4  
4. Putting on and taking off regular upper body clothing? [] 1   [x] 2   [] 3   [] 4  
5. Taking care of personal grooming such as brushing teeth? [] 1   [x] 2   [] 3   [] 4  
6. Eating meals? [] 1   [] 2   [x] 3   [] 4  
© , Trustees of [address] [place] [address] [address] [address], under license to [employer]. All rights reserved Score:  Initial:  11 Most Recent: X (Date: -- ) Interpretation of Tool:  Represents activities that are increasingly more difficult (i.e. Bed mobility, Transfers, Gait). Medical Necessity:    
Patient demonstrates  
good and fair 
 rehab potential due to higher previous functional level. Reason for Services/Other Comments: 
Patient continues to require skilled intervention due to Decreased independence with ADL/functional transfers May Cisneros Use of outcome tool(s) and clinical judgement create a POC that gives a: MODERATE COMPLEXITY  
 
 
 
TREATMENT:  
(In addition to Assessment/Re-Assessment sessions the following treatments were rendered) Pre-treatment Symptoms/Complaints:   
Pain: Initial:  
Pain Intensity 1: 0  Post Session:  0/10 Today's treatment session addressed Decreased Strength, Decreased ADL/Functional Activities, Decreased Transfer Abilities, Decreased Ambulation Ability/Technique, Decreased Balance, Increased Pain, Decreased Activity Tolerance, Increased Shortness of Breath, Decreased Flexibility/Joint Mobility, Edema/Girth, Decreased Knowledge of Precautions, Decreased Skin Integrity/Hygeine, Decreased [place] with Home Exercise Program, and Decreased Cognition to progress towards achieving goal(s) stated above. During this session,  Physical Therapy addressed  Balance to progress towards their discipline specific goal(s). Co-treatment was necessary to improve patient's ability to follow higher level commands and ability to increase activity demands. Self Care: (15 minutes): Procedure(s) (per grid) utilized to improve and/or restore self-care/home management as related to dressing, toileting, and activities to prepare for positioning of patient for completing ADL tasks . Required maximal visual, verbal, manual, and tactile cueing to facilitate activities of daily living skills and compensatory activities. Braces/Orthotics/Lines/Etc:  
IV 
brown catheter Wound vac; CVICU monitoring O2 Device: Nasal cannula Treatment/Session Assessment:   
Response to Treatment:  Pt tolerated with anxious behavior. Interdisciplinary Collaboration: Occupational Therapist 
Registered Nurse After treatment position/precautions:  
Up in chair Bed/Chair-wheels locked Call light within reach RN notified Compliance with Program/Exercises: Will assess as treatment progresses. Recommendations/Intent for next treatment session: \"Next visit will focus on advancements to more challenging activities and reduction in assistance provided\". Total Treatment Duration: OT Patient Time In/Time Out Time In: 6164 Time Out: 4615 Hitesh Moreno OT

## 2020-02-03 NOTE — PROGRESS NOTES
A follow up visit was made to the patient. Emotional support, spiritual presence and  
prayer were provided for the patient. He expressed his appreciation for the visit. EDMOND Almaguer

## 2020-02-03 NOTE — PROGRESS NOTES
REVISED GOALS and ONGOING 2/1/2020 all goals  : 
 
STG: 
(1.)Patient will roll side to side in bed with  MODERATE ASSIST within 3-5 day(s). (2.)Patient will move from supine to sit and sit to supine  in bed with  MODERATE ASSIST within 3-5 day(s). (3.)Patient will sit edge of bed/chair with STAND BY ASSIST and good balance statically  for 10 minutes within 3-5 day(s). 4.  Patient will perform 1 sets of 10 repetitions of active range of motion exercises for bilateral lower extremity(s) with  cueing within 3-5 day(s). 5.  Patient will tolerate sitting with bed in chair position for 2 hour 2x/day to facilitate tolerance to sitting in chair within 3-5 days. LTG: 
(1.)Patient will move from supine to sit and sit to supine  and roll side to side in bed with  MIN ASSIST  within 7-10 day(s). (2.)Patient will transfer from bed to chair and chair to bed with  MODERATE ASSIST using the least restrictive device within 7-10 day(s). (3.)Patient will stand with  MINIMAL ASSIST x 2 for 2 minutes with the least restrictive device within 7-10 day(s). 4.  Patient will exhibit 3/5 strength B LE's to facilitate increased independence with bed mobility within 7-10 days. PHYSICAL THERAPY: Daily Note and PM 2/3/2020 INPATIENT: PT Visit Days : 3 Payor: [facility] / Plan: Ebony Encinas / Product Type: [employer] Medicare /   
  
NAME/AGE/GENDER: Ab Correa is a 69 y.o. male PRIMARY DIAGNOSIS: Atherosclerosis of native coronary artery of native heart with unstable angina pectoris ([facility] [I25.110] Nonrheumatic aortic valve stenosis [I35.0] CAD (coronary artery disease) [I25.10] Aortic valve stenosis [I35.0] S/P CABG x 3 S/P CABG x 3 Procedure(s) (LRB): ULTRASOUND on THinners (Bilateral) Day of Surgery ICD-10: Treatment Diagnosis:  
 · Other abnormalities of gait and mobility (R26.89) Precaution/Allergies: Heparin; Amoxicillin; Keflex [cephalexin]; and Pcn [penicillins] ASSESSMENT:  
 
Mr. Charmaine Tavarez underwent above surgery on 1/10/20 and was only recently extubated. He lives with his wife and ambulates independently with cane or RW at baseline. 2/3/20- Pt is in bed upon arrival.  He seems anxious and worried about his O2 being cut off and he not being able to breath. Pt was talked to in a calm reassuring manner. He eventually began calming down. He worked on bed mobility and sitting to side of bed. He sat up about one to two minutes before saying he needed to lie back down. [facility] [employer]. Pt participated in scooting up in bed with max a and rolling side to side again with min-modA to get corinna sling under him. Pt tolerated corinna transfer well. He was totalA for chair transfers. Left in chair with RN present. No significant progress today. This section established at most recent assessment PROBLEM LIST (Impairments causing functional limitations): 1. Decreased Strength 2. Decreased ADL/Functional Activities 3. Decreased Transfer Abilities 4. Decreased Ambulation Ability/Technique 5. Decreased Balance 6. Decreased Activity Tolerance 7. Increased Fatigue 8. Increased Shortness of Breath 9. Decreased Knowledge of Precautions INTERVENTIONS PLANNED: (Benefits and precautions of physical therapy have been discussed with the patient.) 1. Balance Exercise 2. Bed Mobility 3. Neuromuscular Re-education/Strengthening 4. Therapeutic Activites 5. Therapeutic Exercise/Strengthening 6. Transfer Training 7. education TREATMENT PLAN: Frequency/Duration: twice daily for duration of hospital stay Rehabilitation Potential For Stated Goals: Good REHAB RECOMMENDATIONS (at time of discharge pending progress):   
Placement:  
It is my opinion, based on this patient's performance to date, that  Rafat Haddad may benefit from intensive therapy at a [address] [place] [address] after discharge due to the functional deficits listed above that are likely to improve with skilled rehabilitation and severe debility. Equipment:  
? tbd  
? None at this time HISTORY:  
History of Present Injury/Illness (Reason for Referral): 
Patient admitted for above surgery. Past Medical History/Comorbidities:  
Mr. Rafat Haddad  has a past medical history of Arthritis, BPH (benign prostatic hyperplasia) (1/14/2013), CAD (coronary artery disease), DM type 2 (diabetes mellitus, type 2) ([facility] (dx 2004), Dyspnea, Gout (1/14/2013), HLD (hyperlipidemia) (1/14/2013), HTN (hypertension), Morbid obesity ([facility] (9/3/14), Psychiatric disorder, Rheumatic fever, Seasonal allergic rhinitis, Severe aortic valve stenosis, Thyroid disease, and Unspecified sleep apnea (2016). Mr. Rafat Haddad  has a past surgical history that includes hx tonsil and adenoidectomy; hx heart catheterization (12/23/2019); hx orthopaedic (Left); and hx cataract removal (Bilateral, 2012). Social History/Living Environment:  
Home Environment: Private residence # Steps to Enter: 1 One/Two Story Residence: One story Living Alone: No 
Support Systems: Spouse/Significant Other/Partner Patient Expects to be Discharged to[de-identified] Private residence Current DME Used/Available at Home: Cane, straight, Shower chair Tub or Shower Type: Shower Prior Level of Function/Work/Activity: 
 
He lives with his wife and ambulates independently with cane or RW at baseline. Number of Personal Factors/Comorbidities that affect the Plan of Care: 3+: HIGH COMPLEXITY EXAMINATION:  
Most Recent Physical Functioning:  
Gross Assessment: 
  
         
  
Posture: 
  
Balance: 
Sitting - Static: Fair (occasional) Sitting - Dynamic: Poor (constant support) Bed Mobility: 
Rolling: Minimum assistance Supine to Sit: Moderate assistance;Assist x2 Sit to Supine:  Moderate assistance;Assist x2 
 Scooting: Minimum assistance Wheelchair Mobility: 
  
Transfers: 
  
Gait: 
  
   
  
Body Structures Involved: 1. Heart 2. Lungs 3. Muscles Body Functions Affected: 1. Cardio 2. Respiratory 3. Neuromusculoskeletal 
4. Movement Related Activities and Participation Affected: 1. Mobility 2. Self Care 3. Domestic Life 4. Community, Social and [place] Number of elements that affect the Plan of Care: 4+: HIGH COMPLEXITY CLINICAL PRESENTATION:  
Presentation: Evolving clinical presentation with changing clinical characteristics: MODERATE COMPLEXITY CLINICAL DECISION MAKIN[address] [place] [address] AM-PAC 6 Clicks Basic Mobility Inpatient Short Form How much difficulty does the patient currently have. .. Unable A Lot A Little None 1. Turning over in bed (including adjusting bedclothes, sheets and blankets)? [x] 1   [] 2   [] 3   [] 4  
2. Sitting down on and standing up from a chair with arms ( e.g., wheelchair, bedside commode, etc.)   [x] 1   [] 2   [] 3   [] 4  
3. Moving from lying on back to sitting on the side of the bed? [x] 1   [] 2   [] 3   [] 4 How much help from another person does the patient currently need. .. Total A Lot A Little None 4. Moving to and from a bed to a chair (including a wheelchair)? [x] 1   [] 2   [] 3   [] 4  
5. Need to walk in hospital room? [x] 1   [] 2   [] 3   [] 4  
6. Climbing 3-5 steps with a railing? [x] 1   [] 2   [] 3   [] 4  
© , Trustees of [address] [place] [address], under license to [employer]. All rights reserved Score:  Initial: 6 Most Recent: X (Date: -- ) Interpretation of Tool:  Represents activities that are increasingly more difficult (i.e. Bed mobility, Transfers, Gait). Medical Necessity:    
· Patient is expected to demonstrate progress in  
· strength, range of motion, balance, coordination, functional technique, and activity tolerance ·  to  
· decrease assistance required with all functional mobility · . Reason for Services/Other Comments: 
· Patient continues to require skilled intervention due to · medical complications and patient unable to attend/participate in therapy as expected · . Use of outcome tool(s) and clinical judgement create a POC that gives a: Questionable prediction of patient's progress: MODERATE COMPLEXITY  
  
 
 
 
TREATMENT:  
(In addition to Assessment/Re-Assessment sessions the following treatments were rendered) Pre-treatment Symptoms/Complaints: \"What do I do when my oxygen is cut off?\"  
Pain: Initial: 0 FLACC Post Session:  0/10 FLACC Neuromuscular Re-education: ( 30 min):  Exercise/activities, bed mobility and sitting EOB to improve balance, coordination and posture. Required min verbal and tactile cues to promote dynamic balance in sitting. .  
Therapeutic Exercise: ( 8  minutes):  Exercises per grid below to improve mobility, strength, balance and coordination. Required minimum to moderate verbal cues to participate and perform correctly. Progressed resistance and repetitions as indicated. DATE: 1/26/20 1/29/20 2/3/20 Straight leg raise Hip abduct/ adduct X5 AAB Heel slides  X3 AB  10 x 2 B Hip external/ internal rotation Ankle dorsiflexion/ plantarflexion X5 AB 25 10 x 2 B Sitting unsupported x5'       
LAQ  25      
marching  25 Key:  A=active, AA=active assisted, P=passive, B=bilaterally, R=right, L=left Braces/Orthotics/Lines/Etc:  
· Wound vac Treatment/Session Assessment:   
· Response to Treatment:  See above. · Interdisciplinary Collaboration:  
o Physical Therapy Assistant 
o Occupational Therapist 
o Registered Nurse · After treatment position/precautions:  
o Supine in bed 
o Bed/Chair-wheels locked 
o Nurse at bedside · Compliance with Program/Exercises: Will assess as treatment progresses · Recommendations/Intent for next treatment session:   \"Next visit will focus on advancements to more challenging activities and reduction in assistance provided\". Total Treatment Duration: PT Patient Time In/Time Out Time In: 5361 Time Out: 3594 Alcario Andrés, PTA

## 2020-02-03 NOTE — PROGRESS NOTES
Hospitalist Progress Note Admit Date:  1/10/2020  4:59 AM  
Name:  Jase Mckeon Age:  69 y.o. 
:  1945 MRN:  [id number] PCP:  Windy Pagan MD 
Treatment Team: Attending Provider: Elizabeth Conklin MD; Consulting Provider: Monse Hidalgo MD; Consulting Provider: Hitesh Denney MD; Care Manager: Antonina Chang; Consulting Provider: Nelida Brooks MD; Utilization Review: Reji Cruz RN; Utilization Review: Aquiles Augustin RN; Consulting Provider: Marc Hughes MD 
 
Subjective:  
78-year-old  male patient, status post CABG and status post AVR, acute hypoxic respiratory failure presently on Airvo, acute kidney injury presently on dialysis, history of A. 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 2020 Patient sitting in chair, presently on oxygen 6 L/min, says did not have a good night, says feels better now. 2/3/2020 Sleeping meds, easily arousable, presently on 3.5 L of oxygen nasal cannula. Mildly elevated glucose this morning in 200s. Presently on sliding scale and at nighttime Lantus Patient says at home he would normally takes 75 units of Lantus in the morning and 35 units in the evening. Objective:  
 
Patient Vitals for the past 24 hrs: 
 Temp Pulse Resp BP SpO2  
20 1539     98 % 20 1200  77 (!) 50 104/54 100 % 20 1130  70  109/57   
20 1128     100 % 20 1100  66 17 106/59 99 % 20 1030  67  110/54   
20 1029   20  99 % 20 1000  73 22 121/56 97 % 20 0930  66  125/56   
20 0929   15  97 % 20 0900  63 17 117/58 100 % 20 0850     95 % 20 0831  71 26 125/57 96 % 02/03/20 0830  69  125/57   
02/03/20 0810  62  110/57   
02/03/20 0754  66 15 110/57 97 % 02/03/20 0700 97.2 °F (36.2 °C) 63 22  99 % 02/03/20 0630  63 18  99 % 02/03/20 0600  64 21  99 % 02/03/20 0500  67 30  97 % 02/03/20 0400  67 21  98 % 02/03/20 0300 97.8 °F (36.6 °C) 68 17 123/56 99 % 02/03/20 0200  67 20  99 % 02/03/20 0100  79 20  92 % 02/03/20 0056  80  159/70 95 % 02/03/20 0000  69 18  99 % 02/02/20 2300  69 18  99 % 02/02/20 2225 97 °F (36.1 °C) 75 22 98/54 99 % 02/02/20 2200  74 30  97 % 02/02/20 2124     98 % 02/02/20 2100  69 28  97 % 02/02/20 2001  75     
02/02/20 1900  74 25    
02/02/20 1826 98 °F (36.7 °C) 72 24 107/51  Oxygen Therapy O2 Sat (%): 98 % (02/03/20 1539) Pulse via Oximetry: 78 beats per minute (02/03/20 1539) O2 Device: Nasal cannula (02/03/20 1539) O2 Flow Rate (L/min): 4 l/min (02/03/20 1539) O2 Temperature: 87.8 °F (31 °C) (02/01/20 0659) FIO2 (%): 50 % (02/01/20 1553) Intake/Output Summary (Last 24 hours) at 2/3/2020 1724 Last data filed at 2/3/2020 1130 Gross per 24 hour Intake 713.28 ml Output 3895 ml Net -3181.72 ml General:          Well nourished. Alert. On  oxygen 3.5 l/min Eyes:               Normal sclera. Extraocular movements intact. ENT:                Normocephalic, atraumatic. Moist mucous membranes CV:                  RRR. No murmur, rub, or gallop. Lungs:             Coarse breath sounds Abdomen:        Soft, nontender, nondistended. Bowel sounds normal.  Morbidly obese Extremities:     Warm and dry. Toes of bilateral feet dusky in color, normal sensation. Mild petechial lesion palmar aspect of right index finger, left thumb index and middle finger Neurologic:      CN II-XII grossly intact. Sensation intact. Skin:                Wound VAC left thigh region medial aspect Psych:             Normal mood and affect. Data Review: I have reviewed all labs, meds, telemetry events, and studies from the last 24 hours. Recent Results (from the past 24 hour(s)) GLUCOSE, POC Collection Time: 02/02/20  8:29 PM  
Result Value Ref Range Glucose (POC) 301 (H) 65 - 100 mg/dL GLUCOSE, POC Collection Time: 02/02/20  8:31 PM  
Result Value Ref Range Glucose (POC) 260 (H) 65 - 100 mg/dL MAGNESIUM Collection Time: 02/03/20  2:19 AM  
Result Value Ref Range Magnesium 2.4 1.8 - 2.4 mg/dL METABOLIC PANEL, BASIC Collection Time: 02/03/20  2:19 AM  
Result Value Ref Range Sodium 134 (L) 136 - 145 mmol/L Potassium 4.8 3.5 - 5.1 mmol/L Chloride 99 98 - 107 mmol/L  
 CO2 29 21 - 32 mmol/L Anion gap 6 (L) 7 - 16 mmol/L Glucose 262 (H) 65 - 100 mg/dL BUN 57 (H) 8 - 23 MG/DL Creatinine 5.97 (H) 0.8 - 1.5 MG/DL  
 GFR est AA 12 (L) >60 ml/min/1.73m2 GFR est non-AA 10 (L) >60 ml/min/1.73m2 Calcium 8.1 (L) 8.3 - 10.4 MG/DL  
PTT Collection Time: 02/03/20  2:19 AM  
Result Value Ref Range aPTT 58.7 (H) 24.7 - 39.8 SEC PHOSPHORUS Collection Time: 02/03/20  2:19 AM  
Result Value Ref Range Phosphorus 5.5 (H) 2.3 - 3.7 MG/DL  
CBC W/O DIFF Collection Time: 02/03/20  2:19 AM  
Result Value Ref Range WBC 9.9 4.3 - 11.1 K/uL  
 RBC 2.68 (L) 4.23 - 5.6 M/uL HGB 8.0 (L) 13.6 - 17.2 g/dL HCT 26.0 (L) 41.1 - 50.3 % MCV 97.0 79.6 - 97.8 FL  
 MCH 29.9 26.1 - 32.9 PG  
 MCHC 30.8 (L) 31.4 - 35.0 g/dL  
 RDW 15.0 (H) 11.9 - 14.6 % PLATELET 183 (L) 971 - 450 K/uL MPV 11.1 9.4 - 12.3 FL ABSOLUTE NRBC 0.00 0.0 - 0.2 K/uL GLUCOSE, POC Collection Time: 02/03/20  5:47 AM  
Result Value Ref Range Glucose (POC) 218 (H) 65 - 100 mg/dL GLUCOSE, POC Collection Time: 02/03/20 11:26 AM  
Result Value Ref Range Glucose (POC) 131 (H) 65 - 100 mg/dL PTT Collection Time: 02/03/20 12:50 PM  
Result Value Ref Range aPTT 41.2 (H) 24.7 - 39.8 SEC PTT  
 Collection Time: 02/03/20  4:17 PM  
Result Value Ref Range aPTT 48.0 (H) 24.7 - 39.8 SEC  
GLUCOSE, POC Collection Time: 02/03/20  4:18 PM  
Result Value Ref Range Glucose (POC) 215 (H) 65 - 100 mg/dL All Micro Results Procedure Component Value Units Date/Time CULTURE, BLOOD [[id number]] Collected:  01/14/20 1005 Order Status:  Completed Specimen:  Blood Updated:  01/19/20 0701 Special Requests: --     
  RIGHT 
ARTL (ART LINE) Culture result: NO GROWTH 5 DAYS     
 CULTURE, BLOOD [[id number]] Collected:  01/14/20 1145 Order Status:  Completed Specimen:  Blood Updated:  01/19/20 0701 Special Requests: --     
  LEFT 
HAND Culture result: NO GROWTH 5 DAYS     
 CULTURE, BLOOD [[id number]] Collected:  01/12/20 5371 Order Status:  Completed Specimen:  Blood Updated:  01/17/20 0740 Special Requests: --     
  RIGHT Antecubital 
  
  Culture result: NO GROWTH 5 DAYS     
 CULTURE, BLOOD [[id number]] Collected:  01/12/20 0514 Order Status:  Completed Specimen:  Blood Updated:  01/17/20 0740 Special Requests: --     
  LEFT 
HAND Culture result: NO GROWTH 5 DAYS     
 CULTURE, URINE [[id number]] Collected:  01/14/20 1950 Order Status:  Completed Specimen:  Urine from [place] Specimen Updated:  01/17/20 9725 Special Requests: NO SPECIAL REQUESTS Culture result: NO GROWTH 2 DAYS     
 CULTURE, RESPIRATORY/SPUTUM/BRONCH Maria Teresa Rodgers [[id number]] Collected:  01/14/20 1132 Order Status:  Completed Specimen:  Sputum,ET Suction Updated:  01/16/20 3011 Special Requests: NO SPECIAL REQUESTS     
  GRAM STAIN 15 TO 20 WBCS SEEN PER OIF  
   0 TO 1 EPITHELIAL CELLS SEEN PER OIF  
   1+ MUCUS PRESENT     
   FEW GRAM POSITIVE RODS     
   FEW GRAM POSITIVE COCCI Culture result:    
  LIGHT NORMAL RESPIRATORY BRENNAN  
     
 CULTURE, RESPIRATORY/SPUTUM/BRONCH KODAK Bautista [[id number]] Collected:  01/12/20 5137 Order Status:  Completed Specimen:  Sputum from Endotracheal aspirate Updated:  01/14/20 8649 Special Requests: NO SPECIAL REQUESTS     
  GRAM STAIN 15 TO 20 WBCS SEEN PER OIF  
   0 TO 1 EPITHELIAL CELLS SEEN PER OIF  
   2+ MUCUS PRESENT     
   RARE GRAM POSITIVE COCCI Culture result:    
  LIGHT NORMAL RESPIRATORY BRENNAN  
     
 CULTURE, URINE [[id number]] Collected:  01/12/20 0901 Order Status:  Completed Specimen:  Urine from [place] Specimen Updated:  01/14/20 0725 Special Requests: NO SPECIAL REQUESTS Culture result: NO GROWTH 2 DAYS Current Meds: 
Current Facility-Administered Medications Medication Dose Route Frequency  insulin glargine (LANTUS) injection 10 Units  10 Units SubCUTAneous DAILY  insulin lispro (HUMALOG) injection   SubCUTAneous AC&HS  
 albuterol-ipratropium (DUO-NEB) 2.5 MG-0.5 MG/3 ML  3 mL Nebulization Q4H PRN  
 insulin glargine (LANTUS) injection 10 Units  10 Units SubCUTAneous QHS  albuterol-ipratropium (DUO-NEB) 2.5 MG-0.5 MG/3 ML  3 mL Nebulization QID RT  
 amiodarone (CORDARONE) tablet 400 mg  400 mg Oral Q12H  
 famotidine (PEPCID) tablet 20 mg  20 mg Oral DAILY  nitroglycerin (NITROBID) 2 % ointment 1 Inch  1 Inch Topical TID  ondansetron (ZOFRAN) injection 4 mg  4 mg IntraVENous Q6H PRN  
 busPIRone (BUSPAR) tablet 10 mg  10 mg Oral TID  morphine 10 mg/ml injection 5 mg  5 mg IntraVENous Q4H PRN  
 oxyCODONE-acetaminophen (PERCOCET) 5-325 mg per tablet 1 Tab  1 Tab Per NG tube Q4H PRN  
 traMADol (ULTRAM) tablet 50 mg  50 mg Oral Q6H PRN  
 epoetin maritza-epbx (RETACRIT) 14,000 Units combo injection  14,000 Units SubCUTAneous Q7D  
 argatroban 50 mg in 0.9% sodium chloride 50 mL (1000 mcg/mL) infusion  0.5-10 mcg/kg/min IntraVENous TITRATE  alcohol 62% (NOZIN) nasal  1 Ampule  1 Ampule Topical Q12H  
 NUTRITIONAL SUPPORT ELECTROLYTE PRN ORDERS   Does Not Apply PRN  
  acetaminophen (TYLENOL) solution 650 mg  650 mg Per NG tube Q4H PRN  
 sodium chloride (NS) flush 5-40 mL  5-40 mL IntraVENous Q8H  
 sodium chloride (NS) flush 5-40 mL  5-40 mL IntraVENous PRN  
 naloxone (NARCAN) injection 0.4 mg  0.4 mg IntraVENous PRN  
 [Held by provider] metoprolol tartrate (LOPRESSOR) tablet 25 mg  25 mg Oral Q12H  
 atorvastatin (LIPITOR) tablet 80 mg  80 mg Oral QHS  dextrose (D50W) injection syrg 12.5 g  25 mL IntraVENous PRN  
 [Held by provider] aspirin chewable tablet 81 mg  81 mg Oral DAILY  magnesium sulfate 1 g/100 ml IVPB (premix or compounded)  1 g IntraVENous PRN  
 sodium bicarbonate (8.4%) injection 50 mEq  50 mEq IntraVENous PRN Other Studies (last 24 hours): Xr Chest [address] Result Date: 2/3/2020 Portable chest xray  COMPARISON: February 2, 2020. CLINICAL HISTORY: Post CABG, follow-up. FINDINGS: Stable postsurgical changes of sternotomy. Right-sided cardiac pacer and left-sided subclavian catheter are stable. Persistent airspace densities in the left lung. No pneumothorax. Cardiac mediastinal contour and the surrounding bones are stable. IMPRESSION: 1. Persistent airspace densities in the left lung, likely atelectasis or infection. Assessment and Plan:  
 
Hospital Problems as of 2/3/2020 Date Reviewed: 12/6/2019 Codes Class Noted - Resolved POA Pleural effusion ICD-10-CM: J90 ICD-9-CM: 511.9  2/3/2020 - Present Unknown HIT (heparin-induced thrombocytopenia) (HCC) ICD-10-CM: Q59.38 ICD-9-CM: 289.84  1/23/2020 - Present Unknown Bilateral pneumothorax ICD-10-CM: J93.9 ICD-9-CM: 512.89  1/17/2020 - Present Unknown Thrombocytopenia ([facility] ICD-10-CM: D69.6 ICD-9-CM: 287.5  1/17/2020 - Present Unknown Shock ([facility] ICD-10-CM: R57.9 ICD-9-CM: 785.50  1/15/2020 - Present Unknown Atrial fibrillation [facility]) ICD-10-CM: I48.91 
ICD-9-CM: 427.31  1/13/2020 - Present Unknown Acute renal failure (ARF) ([facility]) ICD-10-CM: N17.9 ICD-9-CM: 584.9  1/11/2020 - Present Unknown Aortic valve stenosis ICD-10-CM: I35.0 ICD-9-CM: 424.1  1/10/2020 - Present Unknown CAD (coronary artery disease) ICD-10-CM: I25.10 ICD-9-CM: 414.00  1/10/2020 - Present Unknown Weaning from respirator [facility]) ICD-10-CM: Z99.11 ICD-9-CM: V46.13  1/10/2020 - Present Unknown Acute hypoxemic respiratory failure ([address] ICD-10-CM: J96.01 
ICD-9-CM: 518.81  1/10/2020 - Present * (Principal) S/P CABG x 3 ICD-10-CM: Z95.1 ICD-9-CM: V45.81  1/10/2020 - Present Unknown S/P AVR ICD-10-CM: Z95.2 ICD-9-CM: V43.3  1/10/2020 - Present Unknown DM type 2 (diabetes mellitus, type 2) ([facility]) ICD-10-CM: E11.9 ICD-9-CM: 250.00  1/14/2013 - Present Yes RESOLVED: Metabolic acidosis VAT-00-CO: E87.2 ICD-9-CM: 276.2  1/11/2020 - 1/16/2020 Unknown RESOLVED: Hyperkalemia ICD-10-CM: E87.5 ICD-9-CM: 276.7  1/11/2020 - 1/16/2020 Unknown PLAN:   
 -Diabetes mellitus type 2-add 10 units of Lantus in the morning, continue 10 units of Lantus in the evening, continue sliding scale. Patient probably would require daily adjustment of his insulin as he is getting less than one 1/5 of insulin what he normally uses. Could also be that he is a new dialysis patient. 
-Acute hypoxic respiratory failure-on oxygen nasal cannula 3.5 l/min, pulmonary following HIT-on argatroban 
-Status post CABG and AVR 
-Ischemic toes- 
-DEJUAN on dialysis 
-Wound VAC to left thigh medial aspect 
  
[facility], full code. Will follow.  
 
Signed: 
Shanel Khan MD

## 2020-02-03 NOTE — PROGRESS NOTES
2/3/2020 OT order received and chart reviewed, however pt is currently in dialysis. Will re-attempt this afternoon as time permits. Thank you, Pushpa Jorge, OT

## 2020-02-03 NOTE — DIALYSIS
Consent verified for renal replacement therapy. HD initiated using LTPC. /57 P 62 BATH 3K. Machine settings per MD order. Pt sleeping, disoriented when awake. Will monitor during treatment.

## 2020-02-03 NOTE — PROGRESS NOTES
POD 24 Days Post-Op Procedure:  Procedure(s): CORONARY ARTERY BYPASS GRAFT WITH AVR/ (CABG X 3 )/ LIMA VEIN HARVEST/ GREATER SAPHENOUS 
ESOPHAGEAL TRANS ECHOCARDIOGRAM 
 
 
Subjective:  
 
Patient has No significant medical complaints Objective:  
 
Patient Vitals for the past 8 hrs: 
 BP Temp Pulse Resp SpO2 Weight  
20 0630   63 18 99 %   
20 0600   64 21 99 %   
20 0543      283 lb (128.4 kg) 20 0500   67 30 97 %   
20 0400   67 21 98 %   
20 0300 123/56 97.8 °F (36.6 °C) 68 17 99 %   
20 0200   67 20 99 %   
20 0100   79 20 92 %   
20 0056 159/70  80  95 %   
20 0013      283 lb (128.4 kg) 20 0000   69 18 99 %  Temp (24hrs), Av °F (36.7 °C), Min:97 °F (36.1 °C), Max:98.6 °F (37 °C) Hemodynamics PAP Systolic: 50 PAP 
CO (l/min): 7.5 l/min CO 
CI (l/min/m2): 2.9 l/min/m2 CI No intake/output data recorded.  1901 -  0700 In: 1446.8 [P.O.:1280; I.V.:166.8] Out: 250 [Urine:200; Drains:50] CT Drainage 
  
  
   
  total of all CT's Heart:  regular rate and rhythm, S1, S2 normal, no murmur, click, rub or gallop Lung:  clear to auscultation bilaterally Neuro: Grossly non focal 
Incisions: Clean, dry, and intact Labs: 
Recent Results (from the past 24 hour(s)) GLUCOSE, POC Collection Time: 20 11:33 AM  
Result Value Ref Range Glucose (POC) 201 (H) 65 - 100 mg/dL GLUCOSE, POC Collection Time: 20  4:00 PM  
Result Value Ref Range Glucose (POC) 183 (H) 65 - 100 mg/dL GLUCOSE, POC Collection Time: 20  8:29 PM  
Result Value Ref Range Glucose (POC) 301 (H) 65 - 100 mg/dL MAGNESIUM Collection Time: 20  2:19 AM  
Result Value Ref Range Magnesium 2.4 1.8 - 2.4 mg/dL METABOLIC PANEL, BASIC Collection Time: 20  2:19 AM  
Result Value Ref Range  Sodium 134 (L) 136 - 145 mmol/L  
 Potassium 4.8 3.5 - 5.1 mmol/L Chloride 99 98 - 107 mmol/L  
 CO2 29 21 - 32 mmol/L Anion gap 6 (L) 7 - 16 mmol/L Glucose 262 (H) 65 - 100 mg/dL BUN 57 (H) 8 - 23 MG/DL Creatinine 5.97 (H) 0.8 - 1.5 MG/DL  
 GFR est AA 12 (L) >60 ml/min/1.73m2 GFR est non-AA 10 (L) >60 ml/min/1.73m2 Calcium 8.1 (L) 8.3 - 10.4 MG/DL  
PTT Collection Time: 02/03/20  2:19 AM  
Result Value Ref Range aPTT 58.7 (H) 24.7 - 39.8 SEC PHOSPHORUS Collection Time: 02/03/20  2:19 AM  
Result Value Ref Range Phosphorus 5.5 (H) 2.3 - 3.7 MG/DL  
CBC W/O DIFF Collection Time: 02/03/20  2:19 AM  
Result Value Ref Range WBC 9.9 4.3 - 11.1 K/uL  
 RBC 2.68 (L) 4.23 - 5.6 M/uL HGB 8.0 (L) 13.6 - 17.2 g/dL HCT 26.0 (L) 41.1 - 50.3 % MCV 97.0 79.6 - 97.8 FL  
 MCH 29.9 26.1 - 32.9 PG  
 MCHC 30.8 (L) 31.4 - 35.0 g/dL  
 RDW 15.0 (H) 11.9 - 14.6 % PLATELET 137 (L) 277 - 450 K/uL MPV 11.1 9.4 - 12.3 FL ABSOLUTE NRBC 0.00 0.0 - 0.2 K/uL GLUCOSE, POC Collection Time: 02/03/20  5:47 AM  
Result Value Ref Range Glucose (POC) 218 (H) 65 - 100 mg/dL Assessment:  
 
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 
  DM type 2 (diabetes mellitus, type 2) ([facility] (1/14/2013) Aortic valve stenosis (1/10/2020) CAD (coronary artery disease) (1/10/2020) Weaning from respirator [facility]) (1/10/2020) Acute hypoxemic respiratory failure ([address] (1/10/2020) S/P AVR (1/10/2020) Acute renal failure (ARF) ([address] (1/11/2020) Atrial fibrillation ([address] (1/13/2020) Shock ([address] (1/15/2020) Bilateral pneumothorax (1/17/2020) Thrombocytopenia ([address] (1/17/2020) HIT (heparin-induced thrombocytopenia) ([address] (1/23/2020) Pleural effusion (2/3/2020) Plan/Recommendations/Medical Decision Making:  
 
plts 136K today, ambulate, PT/OT See orders

## 2020-02-04 NOTE — PROGRESS NOTES
Bedside shift change report given to Anahi Singh RN (oncoming nurse) by Jam Burton RN (offgoing nurse). Report included the following information SBAR, Kardex, Procedure Summary, Intake/Output, MAR, Recent Results, Med Rec Status and Cardiac Rhythm of NSR with BBB, alternating with Pacing.

## 2020-02-04 NOTE — PROGRESS NOTES
Pt to IR Suite 2 via hospital bed from CVICU IR Nurse Pre-Procedure Checklist Part 2 Consent signed: Yes 
 
H&P complete:  Yes Antibiotics: Not applicable Airway/Mallampati Done: Yes Shaved: Yes Pregnancy Form:Not applicable Patient Position: Not applicable MD Side: Yes Biopsy Worksheet: Not applicable Specimen Medium: Not applicable

## 2020-02-04 NOTE — PROGRESS NOTES
Wound care provided to Bilateral feet and toes. Bilateral toe blisters have popped of their own volition. Toes cleansed wither Dermal wound cleanser. The toes were then wrapped with xeroform, 4x4 and kerlix. Patient tolerated. Toes with some sanguinous output.

## 2020-02-04 NOTE — PROGRESS NOTES
Nutrition Follow up:  
 Assessment:  
Diet orders: 1-26: Clear liquid, 1-27: CCHO, cardiac with Glucerna shakes tid, 1-30: Nepro tid, Glucerna discontinued Food/Nutrition History: Pt was NPO for permacath this morning and isn't hungry for lunch. He reports appetite is \"the same, just not hungry\". He reports drinking 1 or 2 nutrition shakes a day. He says his stomach has been bothering him= nausea at times and also diarrhea this morning. Continues to require HD, receiving 3 to 4 K bath for last 3 treatments. K range 4.3 to 4.8. With decreased po intake, would not restrict po K intake at this time. Pt now with wound vac to thigh wound Anthropometrics:Height: 5' 10\" (177.8 cm), Weight Source: Bed, Weight: 122 kg (269 lb) Last 3 Recorded Weights in this Encounter 02/03/20 0013 02/03/20 0543 02/04/20 0514 Weight: 128.4 kg (283 lb) 128.4 kg (283 lb) 122 kg (269 lb) Edema: Trace generalized,1+BUE and BLE Pre-op standing scale weight: 136.6 kg UBW range per EMR: 300-306# Pt has had successful fluid removal with HD but question accuracy of weights. If accurate pt has had ~30# of weight loss (10% change c/w severe change) since admission. This would not be unexpected an nutritional intake has been inadequate his entire 25 day admission. Current BMI 38.6 c/w class II obesity Revised Macronutrient needs(new weight and no longer on vent/critically ill:(using CBW (Current body weight) 122 kg and IBW 75.5 kg) EER:  2660-4953 kcal /day (15-18 kcal/kg UBW) 
EPR:  91-98 grams protein/day (1.2-1.3 grams/kg IBW)-for HD Intake/Comparative Standards:Average intake for past 6 day(s)/12 recorded meal(s): 38%. This potentially meets ~25-50% of kcal and ~25-50% of protein needs Meets Criteria for Malnutrition in the context of Acute Illness/Injury [x] Severe Malnutrition, as evidenced by: 
 [x] Nutritional intake of <50% of energy intake compared to estimated energy needs for > 5 days [x] Weight loss of >2% in 1 week, >5% in 1 month, >7.5% in 3 months 
 [] Moderate to severe loss of muscle mass 
 [] Moderate to severe loss of subcutaneous fat 
 [] Moderate to severe edema Nutrition diagnosis: Severe acute disease or injury related malnutrition related to decreased intake  as evidenced by estimated intake less than estimated needs and weight loss as above. Intervention: 
Meals and snacks: Continue current diet. Nutrition Supplement Therapy: Continue Nepro tid. Encouraged pt to increase intake of these to 3 per day. [facility], [address], [facility] [address] [place] [address]

## 2020-02-04 NOTE — PROGRESS NOTES
POD 1 Day Post-Op Procedure:  Procedure(s): ULTRASOUND on THinners Subjective:  
 
Patient has No significant medical complaints Objective:  
 
Patient Vitals for the past 8 hrs: 
 BP Temp Pulse Resp SpO2 Height Weight  
20 0700 142/69  84  92 %    
20 0600 136/63  74 18 96 %    
20 0500 123/56  75 27 98 %    
20 0420 127/60  80 16 93 % 5' 10\" (1.778 m) 269 lb (122 kg) 20 0300 114/57 98.5 °F (36.9 °C) 75 20 94 %    
20 0200 106/55  81 (!) 31 95 %    
20 0100 102/56  75 18 94 %    
20 0000 92/53 98.3 °F (36.8 °C) 77 22 93 %   Temp (24hrs), Av.1 °F (36.7 °C), Min:97.5 °F (36.4 °C), Max:98.5 °F (36.9 °C) Hemodynamics PAP Systolic: 50 PAP 
CO (l/min): 7.5 l/min CO 
CI (l/min/m2): 2.9 l/min/m2 CI No intake/output data recorded.  1901 -  0700 In: 295.2 [P.O.:240; I.V.:55.2] Out: 0072 [Urine:200; Drains:50] CT Drainage 
  
  
   
  total of all CT's Heart:  regular rate and rhythm, S1, S2 normal, no murmur, click, rub or gallop Lung:  clear to auscultation bilaterally Neuro: Grossly non focal 
Incisions: Clean, dry, and intact Labs: 
Recent Results (from the past 24 hour(s)) GLUCOSE, POC Collection Time: 20 11:26 AM  
Result Value Ref Range Glucose (POC) 131 (H) 65 - 100 mg/dL PTT Collection Time: 20 12:50 PM  
Result Value Ref Range aPTT 41.2 (H) 24.7 - 39.8 SEC  
PTT Collection Time: 20  4:17 PM  
Result Value Ref Range aPTT 48.0 (H) 24.7 - 39.8 SEC  
GLUCOSE, POC Collection Time: 20  4:18 PM  
Result Value Ref Range Glucose (POC) 215 (H) 65 - 100 mg/dL PTT Collection Time: 20  8:58 PM  
Result Value Ref Range aPTT 54.3 (H) 24.7 - 39.8 SEC  
GLUCOSE, POC Collection Time: 20  9:38 PM  
Result Value Ref Range Glucose (POC) 233 (H) 65 - 100 mg/dL METABOLIC PANEL, BASIC  Collection Time: 20  3:09 AM  
 Result Value Ref Range Sodium 134 (L) 136 - 145 mmol/L Potassium 4.7 3.5 - 5.1 mmol/L Chloride 98 98 - 107 mmol/L  
 CO2 29 21 - 32 mmol/L Anion gap 7 7 - 16 mmol/L Glucose 254 (H) 65 - 100 mg/dL BUN 47 (H) 8 - 23 MG/DL Creatinine 5.69 (H) 0.8 - 1.5 MG/DL  
 GFR est AA 13 (L) >60 ml/min/1.73m2 GFR est non-AA 10 (L) >60 ml/min/1.73m2 Calcium 8.2 (L) 8.3 - 10.4 MG/DL  
CBC W/O DIFF Collection Time: 02/04/20  3:09 AM  
Result Value Ref Range WBC 10.1 4.3 - 11.1 K/uL  
 RBC 2.64 (L) 4.23 - 5.6 M/uL HGB 7.8 (L) 13.6 - 17.2 g/dL HCT 25.8 (L) 41.1 - 50.3 % MCV 97.7 79.6 - 97.8 FL  
 MCH 29.5 26.1 - 32.9 PG  
 MCHC 30.2 (L) 31.4 - 35.0 g/dL  
 RDW 14.9 (H) 11.9 - 14.6 % PLATELET 174 (L) 866 - 450 K/uL MPV 10.9 9.4 - 12.3 FL ABSOLUTE NRBC 0.00 0.0 - 0.2 K/uL Assessment:  
 
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 
  DM type 2 (diabetes mellitus, type 2) ([facility] [address] (1/14/2013) Aortic valve stenosis (1/10/2020) CAD (coronary artery disease) (1/10/2020) Weaning from respirator [facility]) (1/10/2020) Acute hypoxemic respiratory failure ([address] (1/10/2020) S/P AVR (1/10/2020) Acute renal failure (ARF) ([address] (1/11/2020) Atrial fibrillation ([address] (1/13/2020) Shock ([address] (1/15/2020) Bilateral pneumothorax (1/17/2020) Thrombocytopenia ([facility] [address] (1/17/2020) HIT (heparin-induced thrombocytopenia) ([facility] [address] (1/23/2020) Pleural effusion (2/3/2020) Plan/Recommendations/Medical Decision Making:  
 
plts 144K, Permacath today, start Coumadin tonight, ? Long term placement See orders

## 2020-02-04 NOTE — PROGRESS NOTES
Chart reviewed. Consult performed by hospitalist 2/3/20 for dm mgmt. His creatinine is no better and for HD 20. fsbs remain 250 range on 10unit bid (home dose > 4x higher) and prandial humalog ss coverage. Plan : increase lantus bid 15units, and may need to titrate closer to home dosage once dialysis starts 20. Will follow . Hospitalist Progress Note Admit Date:  1/10/2020  4:59 AM  
Name:  Carrillo Arias Age:  69 y.o. 
:  1945 MRN:  [id number] PCP:  Marissa Rivera MD 
Treatment Team: Attending Provider: Skylar Berger MD; Consulting Provider: Eric Blevins MD; Consulting Provider: Carmelo Hanna MD; Care Manager: Odalys Irwin; Consulting Provider: Rom Ovalle MD; Utilization Review: Danika Huerta RN; Utilization Review: Katiana Mishra RN; Consulting Provider: Myrtie Gaucher, MD; Physical Therapy Assistant: Susan Santos PTA Subjective: HPI and or CC: Copied from prior notes HPI: 
41-year-old  male patient, status post CABG and status post AVR, acute hypoxic respiratory failure presently on Airvo, acute kidney injury presently on dialysis, history of A. 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 
  
2020 Patient sitting in chair, presently on oxygen 6 L/min, says did not have a good night, says feels better now. 
  
2/3/2020 Sleeping meds, easily arousable, presently on 3.5 L of oxygen nasal cannula. Mildly elevated glucose this morning in 200s. Presently on sliding scale and at nighttime Lantus 
  
2020: 
Patient informed prior hospitalist that he was taking 75 units of Lantus in the morning and 35 units at night.   He informs me that the p.m. insulin was novel log. Discussed with him slow titration of insulin and once hemodialysis starts he may require further up titration but for now we will slowly increase his twice daily Lantus to 15 units twice daily and continue sliding scale Humalog and he appears to be in agreement with this plan. Objective:  
 
Patient Vitals for the past 24 hrs: 
 Temp Pulse Resp BP SpO2  
02/04/20 1234     99 % 02/04/20 1116 97 °F (36.1 °C) 80 20 143/60 93 % 02/04/20 1104  84 20 163/60 99 % 02/04/20 1100  82 20 149/62 100 % 02/04/20 1054  82 20 151/66 100 % 02/04/20 1053   20    
02/04/20 1049  84 24 156/74 98 % 02/04/20 1045  83 24 150/72 92 % 02/04/20 0952  91  171/76 (!) 86 % 02/04/20 0826     96 % 02/04/20 0700  84  142/69 92 % 02/04/20 0600  74 18 136/63 96 % 02/04/20 0500  75 27 123/56 98 % 02/04/20 0420  80 16 127/60 93 % 02/04/20 0300 98.5 °F (36.9 °C) 75 20 114/57 94 % 02/04/20 0200  81 (!) 31 106/55 95 % 02/04/20 0100  75 18 102/56 94 % 02/04/20 0000 98.3 °F (36.8 °C) 77 22 92/53 93 % 02/03/20 2300 98.3 °F (36.8 °C) 79 18 106/53 93 % 02/03/20 2200  72 17 99/54 94 % 02/03/20 2100  71  93/55 98 % 02/03/20 2000  70 21 114/56 100 % 02/03/20 1953     97 % 02/03/20 1910  78 17 111/59 97 % 02/03/20 1901 98.1 °F (36.7 °C) 76 20 101/50 97 % 02/03/20 1800  80 23 91/54 95 % 02/03/20 1700  76 27 99/52 95 % Oxygen Therapy O2 Sat (%): 99 % (02/04/20 1234) Pulse via Oximetry: 76 beats per minute (02/04/20 1234) O2 Device: Nasal cannula (02/04/20 1234) O2 Flow Rate (L/min): 5 l/min (02/04/20 1234) O2 Temperature: 87.8 °F (31 °C) (02/01/20 0659) FIO2 (%): 50 % (02/01/20 1553) Intake/Output Summary (Last 24 hours) at 2/4/2020 1603 Last data filed at 2/3/2020 2300 Gross per 24 hour Intake  Output 75 ml Net -75 ml REVIEW OF SYSTEMS: Comprehensive ROS performed and negative except as stated in HPI. Physical Examination: 
General:    Well nourished. Lethargic but pleasant and cooperative Head:  Normocephalic, atraumatic, nares patent Eyes:  Extraocular movements intact, normal sclera CV:   No gross JVD, pulsatile liver or HJR Lungs: No wheeze, decreased breath sounds bilateral 
Abdomen:   Soft, nondistended, nontender. Obese. Extremities: Distal toes dusky but capillary refill present. Suspect chronic venous stasis. Neuro:  No gross focal deficits, no tremor Data Review: 
I have reviewed all labs, meds, telemetry events, and studies from the last 24 hours. Recent Results (from the past 24 hour(s)) PTT Collection Time: 02/03/20  4:17 PM  
Result Value Ref Range aPTT 48.0 (H) 24.7 - 39.8 SEC  
GLUCOSE, POC Collection Time: 02/03/20  4:18 PM  
Result Value Ref Range Glucose (POC) 215 (H) 65 - 100 mg/dL PTT Collection Time: 02/03/20  8:58 PM  
Result Value Ref Range aPTT 54.3 (H) 24.7 - 39.8 SEC  
GLUCOSE, POC Collection Time: 02/03/20  9:38 PM  
Result Value Ref Range Glucose (POC) 233 (H) 65 - 100 mg/dL METABOLIC PANEL, BASIC Collection Time: 02/04/20  3:09 AM  
Result Value Ref Range Sodium 134 (L) 136 - 145 mmol/L Potassium 4.7 3.5 - 5.1 mmol/L Chloride 98 98 - 107 mmol/L  
 CO2 29 21 - 32 mmol/L Anion gap 7 7 - 16 mmol/L Glucose 254 (H) 65 - 100 mg/dL BUN 47 (H) 8 - 23 MG/DL Creatinine 5.69 (H) 0.8 - 1.5 MG/DL  
 GFR est AA 13 (L) >60 ml/min/1.73m2 GFR est non-AA 10 (L) >60 ml/min/1.73m2 Calcium 8.2 (L) 8.3 - 10.4 MG/DL  
CBC W/O DIFF Collection Time: 02/04/20  3:09 AM  
Result Value Ref Range WBC 10.1 4.3 - 11.1 K/uL  
 RBC 2.64 (L) 4.23 - 5.6 M/uL HGB 7.8 (L) 13.6 - 17.2 g/dL HCT 25.8 (L) 41.1 - 50.3 % MCV 97.7 79.6 - 97.8 FL  
 MCH 29.5 26.1 - 32.9 PG  
 MCHC 30.2 (L) 31.4 - 35.0 g/dL  
 RDW 14.9 (H) 11.9 - 14.6 % PLATELET 935 (L) 916 - 450 K/uL MPV 10.9 9.4 - 12.3 FL ABSOLUTE NRBC 0.00 0.0 - 0.2 K/uL PROTHROMBIN TIME + INR Collection Time: 02/04/20  8:23 AM  
Result Value Ref Range Prothrombin time 17.6 (H) 12.0 - 14.7 sec INR 1.4 GLUCOSE, POC Collection Time: 02/04/20  8:33 AM  
Result Value Ref Range Glucose (POC) 238 (H) 65 - 100 mg/dL GLUCOSE, POC Collection Time: 02/04/20 12:04 PM  
Result Value Ref Range Glucose (POC) 257 (H) 65 - 100 mg/dL PTT Collection Time: 02/04/20  3:24 PM  
Result Value Ref Range aPTT 31.1 24.3 - 35.4 SEC All Micro Results Procedure Component Value Units Date/Time CULTURE, BLOOD [[id number]] Collected:  01/14/20 1005 Order Status:  Completed Specimen:  Blood Updated:  01/19/20 0701 Special Requests: --     
  RIGHT 
ARTL (ART LINE) Culture result: NO GROWTH 5 DAYS     
 CULTURE, BLOOD [[id number]] Collected:  01/14/20 1145 Order Status:  Completed Specimen:  Blood Updated:  01/19/20 0701 Special Requests: --     
  LEFT 
HAND Culture result: NO GROWTH 5 DAYS     
 CULTURE, BLOOD [[id number]] Collected:  01/12/20 8371 Order Status:  Completed Specimen:  Blood Updated:  01/17/20 0740 Special Requests: --     
  RIGHT Antecubital 
  
  Culture result: NO GROWTH 5 DAYS     
 CULTURE, BLOOD [[id number]] Collected:  01/12/20 6043 Order Status:  Completed Specimen:  Blood Updated:  01/17/20 0740 Special Requests: --     
  LEFT 
HAND Culture result: NO GROWTH 5 DAYS     
 CULTURE, URINE [[id number]] Collected:  01/14/20 1950 Order Status:  Completed Specimen:  Urine from [place] Specimen Updated:  01/17/20 7631 Special Requests: NO SPECIAL REQUESTS Culture result: NO GROWTH 2 DAYS     
 CULTURE, RESPIRATORY/SPUTUM/BRONCH Gage Allan [[id number]] Collected:  01/14/20 1132 Order Status:  Completed Specimen:  Sputum,ET Suction Updated:  01/16/20 1948   Special Requests: NO SPECIAL REQUESTS     
  GRAM STAIN 15 TO 20 WBCS SEEN PER OIF  
   0 TO 1 EPITHELIAL CELLS SEEN PER OIF  
 1+ MUCUS PRESENT     
   FEW GRAM POSITIVE RODS     
   FEW GRAM POSITIVE COCCI Culture result:    
  LIGHT NORMAL RESPIRATORY BRENNAN  
     
 CULTURE, RESPIRATORY/SPUTUM/BRONCH W Carlos Ventura [[id number]] Collected:  01/12/20 2935 Order Status:  Completed Specimen:  Sputum from Endotracheal aspirate Updated:  01/14/20 6792 Special Requests: NO SPECIAL REQUESTS     
  GRAM STAIN 15 TO 20 WBCS SEEN PER OIF  
   0 TO 1 EPITHELIAL CELLS SEEN PER OIF  
   2+ MUCUS PRESENT     
   RARE GRAM POSITIVE COCCI Culture result:    
  LIGHT NORMAL RESPIRATORY BRENNAN  
     
 CULTURE, URINE [[id number]] Collected:  01/12/20 0901 Order Status:  Completed Specimen:  Urine from [place] Specimen Updated:  01/14/20 0725 Special Requests: NO SPECIAL REQUESTS Culture result: NO GROWTH 2 DAYS Current Meds: 
Current Facility-Administered Medications Medication Dose Route Frequency  warfarin (COUMADIN) tablet 2.5 mg  2.5 mg Oral QPM  
 [START ON 2/5/2020] insulin glargine (LANTUS) injection 15 Units  15 Units SubCUTAneous DAILY  insulin glargine (LANTUS) injection 15 Units  15 Units SubCUTAneous QHS  insulin lispro (HUMALOG) injection   SubCUTAneous AC&HS  
 albuterol-ipratropium (DUO-NEB) 2.5 MG-0.5 MG/3 ML  3 mL Nebulization Q4H PRN  
 albuterol-ipratropium (DUO-NEB) 2.5 MG-0.5 MG/3 ML  3 mL Nebulization QID RT  
 amiodarone (CORDARONE) tablet 400 mg  400 mg Oral Q12H  
 famotidine (PEPCID) tablet 20 mg  20 mg Oral DAILY  nitroglycerin (NITROBID) 2 % ointment 1 Inch  1 Inch Topical TID  ondansetron (ZOFRAN) injection 4 mg  4 mg IntraVENous Q6H PRN  
 busPIRone (BUSPAR) tablet 10 mg  10 mg Oral TID  morphine 10 mg/ml injection 5 mg  5 mg IntraVENous Q4H PRN  
 oxyCODONE-acetaminophen (PERCOCET) 5-325 mg per tablet 1 Tab  1 Tab Per NG tube Q4H PRN  
 traMADol (ULTRAM) tablet 50 mg  50 mg Oral Q6H PRN  
  epoetin maritza-epbx (RETACRIT) 14,000 Units combo injection  14,000 Units SubCUTAneous Q7D  
 argatroban 50 mg in 0.9% sodium chloride 50 mL (1000 mcg/mL) infusion  0.5-10 mcg/kg/min IntraVENous TITRATE  alcohol 62% (NOZIN) nasal  1 Ampule  1 Ampule Topical Q12H  
 NUTRITIONAL SUPPORT ELECTROLYTE PRN ORDERS   Does Not Apply PRN  
 acetaminophen (TYLENOL) solution 650 mg  650 mg Per NG tube Q4H PRN  
 sodium chloride (NS) flush 5-40 mL  5-40 mL IntraVENous Q8H  
 sodium chloride (NS) flush 5-40 mL  5-40 mL IntraVENous PRN  
 naloxone (NARCAN) injection 0.4 mg  0.4 mg IntraVENous PRN  
 [Held by provider] metoprolol tartrate (LOPRESSOR) tablet 25 mg  25 mg Oral Q12H  
 atorvastatin (LIPITOR) tablet 80 mg  80 mg Oral QHS  dextrose (D50W) injection syrg 12.5 g  25 mL IntraVENous PRN  
 [Held by provider] aspirin chewable tablet 81 mg  81 mg Oral DAILY  magnesium sulfate 1 g/100 ml IVPB (premix or compounded)  1 g IntraVENous PRN  
 sodium bicarbonate (8.4%) injection 50 mEq  50 mEq IntraVENous PRN Diet: DIET NUTRITIONAL SUPPLEMENTS 
DIET CARDIAC Other Studies (last 24 hours): 
Ir Insert Tunl Cvc W Port Over 5 Years Result Date: 2/4/2020 Title: Tunneled hemodialysis catheter placement. Indication:   81-year-old male with acute kidney injury. A tunneled hemodialysis catheter is placed for this patient through the left internal jugular vein using ultrasound and fluoroscopic guidance with maximum sterile barrier appropriately documented and fluoroscopy time and images documented in the report. :  Marley Rodriguez PA-C Supervising Physician: Gordon West M.D. Consent: Informed written and oral consent was obtained from the patient after explanation of benefits and risks (including, but not limited to: Infection, hemorrhage, pneumothorax). The patient's questions were answered to their satisfaction.   The patient stated understanding and requested that we proceed. Procedure: This central venous catheter was inserted with all elements of maximal sterile barrier technique, and cap and mask and sterile gown and sterile gloves and sterile full-body drape and hand hygiene and 2% chlorhexidine for cutaneous antisepsis and sterile ultrasound gel and sterile ultrasound probe cover. After the patient was prepped and draped, a local field block with lidocaine was achieved. Ultrasound evaluation of all potential access sites was performed due to lack of a palpable vein. No veins were palpable due to overlying adipose tissue. Using real-time ultrasound guidance, with appropriate image recording and visualization of vascular needle entry, the patent left internal jugular vein was accessed using micropuncture technique. Using fluoroscopy, a peel-away sheath was placed over a wire. A subcutaneous tunnel was anesthetized on the left chest wall. The new catheter was passed down the peel-away sheath and brought through the subcutaneous tunnel. All lumens aspirated easily and were filled with heparinized saline. The venotomy incision was closed with absorbable suture. The catheter was secured with nonabsorbable suture. A sterile Dressing was applied. Complications: None. Radiation dose: Fluoroscopy time: 36 seconds. Reference air kerma (mGy): 74 Kerma area product (cGy.cm2):  1559 Fluoroscopic images: 1 Contrast: 0. Medications:  Lidocaine . Continuous cardiorespiratory monitoring was employed throughout. Findings:  Patent left internal jugular vein. Catheter tip in the mid right atrium. Impression: Technically successful tunneled hemodialysis catheter placement. Plan: Recover for 1 hour. Catheter is ready for use. Suture should be removed in 2 weeks. Assessment and Plan:  
 
Hospital Problems as of 2/4/2020 Date Reviewed: 12/6/2019 Codes Class Noted - Resolved POA  Pleural effusion ICD-10-CM: J90 
 ICD-9-CM: 511.9  2/3/2020 - Present Unknown HIT (heparin-induced thrombocytopenia) ([facility]) ICD-10-CM: R42.59 ICD-9-CM: 289.84  1/23/2020 - Present Unknown Bilateral pneumothorax ICD-10-CM: J93.9 ICD-9-CM: 512.89  1/17/2020 - Present Unknown Thrombocytopenia ([facility] ICD-10-CM: D69.6 ICD-9-CM: 287.5  1/17/2020 - Present Unknown Shock ([facility] ICD-10-CM: R57.9 ICD-9-CM: 785.50  1/15/2020 - Present Unknown Atrial fibrillation [facility]) ICD-10-CM: I48.91 
ICD-9-CM: 427.31  1/13/2020 - Present Unknown Acute renal failure (ARF) ([facility]) ICD-10-CM: N17.9 ICD-9-CM: 584.9  1/11/2020 - Present Unknown Aortic valve stenosis ICD-10-CM: I35.0 ICD-9-CM: 424.1  1/10/2020 - Present Unknown CAD (coronary artery disease) ICD-10-CM: I25.10 ICD-9-CM: 414.00  1/10/2020 - Present Unknown Weaning from respirator [facility]) ICD-10-CM: Z99.11 ICD-9-CM: V46.13  1/10/2020 - Present Unknown Acute hypoxemic respiratory failure ([facility] ICD-10-CM: J96.01 
ICD-9-CM: 518.81  1/10/2020 - Present * (Principal) S/P CABG x 3 ICD-10-CM: Z95.1 ICD-9-CM: V45.81  1/10/2020 - Present Unknown S/P AVR ICD-10-CM: Z95.2 ICD-9-CM: V43.3  1/10/2020 - Present Unknown DM type 2 (diabetes mellitus, type 2) ([facility]) ICD-10-CM: E11.9 ICD-9-CM: 250.00  1/14/2013 - Present Yes RESOLVED: Metabolic acidosis KJO-14-WP: E87.2 ICD-9-CM: 276.2  1/11/2020 - 1/16/2020 Unknown RESOLVED: Hyperkalemia ICD-10-CM: E87.5 ICD-9-CM: 276.7  1/11/2020 - 1/16/2020 Unknown A/P:   
--Titrate Lantus 15 units twice daily and continue NovoLog prandial sliding scale. Will likely need further up titration once hemodialysis starts tomorrow. Home dosage of insulin was at least 75 units of long-acting insulin and significant prandial coverage and possibly over 100 units of long-acting insulin and prandial coverage 
 
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 
 
--Distal ischemia and venous insufficiency likely, wound VAC to left medial thigh, acute kidney injury to start hemodialysis. Signed: 
Stephane CONLEY

## 2020-02-04 NOTE — PROGRESS NOTES
REVISED GOALS and ONGOING 2/1/2020 all goals  : 
 
STG: 
(1.)Patient will roll side to side in bed with  MODERATE ASSIST within 3-5 day(s). (2.)Patient will move from supine to sit and sit to supine  in bed with  MODERATE ASSIST within 3-5 day(s). (3.)Patient will sit edge of bed/chair with STAND BY ASSIST and good balance statically  for 10 minutes within 3-5 day(s). 4.  Patient will perform 1 sets of 10 repetitions of active range of motion exercises for bilateral lower extremity(s) with  cueing within 3-5 day(s). 5.  Patient will tolerate sitting with bed in chair position for 2 hour 2x/day to facilitate tolerance to sitting in chair within 3-5 days. LTG: 
(1.)Patient will move from supine to sit and sit to supine  and roll side to side in bed with  MIN ASSIST  within 7-10 day(s). (2.)Patient will transfer from bed to chair and chair to bed with  MODERATE ASSIST using the least restrictive device within 7-10 day(s). (3.)Patient will stand with  MINIMAL ASSIST x 2 for 2 minutes with the least restrictive device within 7-10 day(s). 4.  Patient will exhibit 3/5 strength B LE's to facilitate increased independence with bed mobility within 7-10 days. PHYSICAL THERAPY: Daily Note and PM 2/4/2020 INPATIENT: PT Visit Days : 4 Payor: Elaine Reeder / Plan: Samantha Jean / Product Type: [employer] /   
  
NAME/AGE/GENDER: Priya Guevara is a 69 y.o. male PRIMARY DIAGNOSIS: Atherosclerosis of native coronary artery of native heart with unstable angina pectoris ([address] [I25.110] Nonrheumatic aortic valve stenosis [I35.0] CAD (coronary artery disease) [I25.10] Aortic valve stenosis [I35.0] S/P CABG x 3 S/P CABG x 3 Procedure(s) (LRB): ULTRASOUND on THinners (Bilateral) 1 Day Post-Op ICD-10: Treatment Diagnosis:  
 · Other abnormalities of gait and mobility (R26.89) Precaution/Allergies: Heparin; Amoxicillin; Keflex [cephalexin]; and Pcn [penicillins] ASSESSMENT:  
 
Mr. Corina Navarrete underwent above surgery on 1/10/20 and was only recently extubated. He lives with his wife and ambulates independently with cane or RW at baseline. 2/4/20- Pt is in bed upon arrival, alert and willing to try and stand. He got to the EOB with moderate assist from  and had good sitting balance EOB. Using the standing frame he stood 3 times. He stood about 5 minutes the first time and tolerated fairly. The second time he passed out with likely low BP but recovered well. The third time he tolerated fairly and was only about 1 minutes standing. Progress made and will continue efforts. PM:  Patient sat up to the EOB with minimal assist.  He sat EOB doing seated exercises with many rest breaks. It was difficult for him as he had to support his balance as well and asked to get to the chair to finish the exercises. He stood in the standing frame and again seemed to assist in standing some. He tolerated well and transferred to the chair. He finished exercises below in the chair with rest breaks to catch his breath. Good progress today. This section established at most recent assessment PROBLEM LIST (Impairments causing functional limitations): 1. Decreased Strength 2. Decreased ADL/Functional Activities 3. Decreased Transfer Abilities 4. Decreased Ambulation Ability/Technique 5. Decreased Balance 6. Decreased Activity Tolerance 7. Increased Fatigue 8. Increased Shortness of Breath 9. Decreased Knowledge of Precautions INTERVENTIONS PLANNED: (Benefits and precautions of physical therapy have been discussed with the patient.) 1. Balance Exercise 2. Bed Mobility 3. Neuromuscular Re-education/Strengthening 4. Therapeutic Activites 5. Therapeutic Exercise/Strengthening 6. Transfer Training 7. education TREATMENT PLAN: Frequency/Duration: twice daily for duration of hospital stay Rehabilitation Potential For Stated Goals: Good REHAB RECOMMENDATIONS (at time of discharge pending progress):   
Placement: It is my opinion, based on this patient's performance to date, that Mr. Lisbeth Ritchie may benefit from intensive therapy at a [facility] after discharge due to the functional deficits listed above that are likely to improve with skilled rehabilitation and severe debility. Equipment:  
? tbd  
? None at this time HISTORY:  
History of Present Injury/Illness (Reason for Referral): 
Patient admitted for above surgery. Past Medical History/Comorbidities:  
Mr. Lisbeth Ritchie  has a past medical history of Arthritis, BPH (benign prostatic hyperplasia) (1/14/2013), CAD (coronary artery disease), DM type 2 (diabetes mellitus, type 2) ([facility] [address] (dx 2004), Dyspnea, Gout (1/14/2013), HLD (hyperlipidemia) (1/14/2013), HTN (hypertension), Morbid obesity ([facility] [address] (9/3/14), Psychiatric disorder, Rheumatic fever, Seasonal allergic rhinitis, Severe aortic valve stenosis, Thyroid disease, and Unspecified sleep apnea (2016). Mr. Lisbeth Ritchie  has a past surgical history that includes hx tonsil and adenoidectomy; hx heart catheterization (12/23/2019); hx orthopaedic (Left); and hx cataract removal (Bilateral, 2012). Social History/Living Environment:  
Home Environment: Private residence # Steps to Enter: 1 One/Two Story Residence: One story Living Alone: No 
Support Systems: Spouse/Significant Other/Partner Patient Expects to be Discharged to[de-identified] Private residence Current DME Used/Available at Home: Cane, straight, Shower chair Tub or Shower Type: Shower Prior Level of Function/Work/Activity: 
 
He lives with his wife and ambulates independently with cane or RW at baseline. Number of Personal Factors/Comorbidities that affect the Plan of Care: 3+: HIGH COMPLEXITY EXAMINATION:  
Most Recent Physical Functioning:  
Gross Assessment: 
  
         
  
Posture: 
  
Balance: 
  Bed Mobility: Supine to Sit: Minimum assistance Sit to Supine: Moderate assistance;Assist x2 Wheelchair Mobility: 
  
Transfers: 
Sit to Stand: (standing frame) Gait: 
  
   
  
Body Structures Involved: 1. Heart 2. Lungs 3. Muscles Body Functions Affected: 1. Cardio 2. Respiratory 3. Neuromusculoskeletal 
4. Movement Related Activities and Participation Affected: 1. Mobility 2. Self Care 3. Domestic Life 4. Community, Social and [place] Number of elements that affect the Plan of Care: 4+: HIGH COMPLEXITY CLINICAL PRESENTATION:  
Presentation: Evolving clinical presentation with changing clinical characteristics: MODERATE COMPLEXITY CLINICAL DECISION MAKIN[address] [place] [address] AM-PAC 6 Clicks Basic Mobility Inpatient Short Form How much difficulty does the patient currently have. .. Unable A Lot A Little None 1. Turning over in bed (including adjusting bedclothes, sheets and blankets)? [x] 1   [] 2   [] 3   [] 4  
2. Sitting down on and standing up from a chair with arms ( e.g., wheelchair, bedside commode, etc.)   [x] 1   [] 2   [] 3   [] 4  
3. Moving from lying on back to sitting on the side of the bed? [x] 1   [] 2   [] 3   [] 4 How much help from another person does the patient currently need. .. Total A Lot A Little None 4. Moving to and from a bed to a chair (including a wheelchair)? [x] 1   [] 2   [] 3   [] 4  
5. Need to walk in hospital room? [x] 1   [] 2   [] 3   [] 4  
6. Climbing 3-5 steps with a railing? [x] 1   [] 2   [] 3   [] 4  
© , Trustees of [address] [place] [address], under license to [employer]. All rights reserved Score:  Initial: 6 Most Recent: X (Date: -- ) Interpretation of Tool:  Represents activities that are increasingly more difficult (i.e. Bed mobility, Transfers, Gait). Medical Necessity:    
· Patient is expected to demonstrate progress in  
· strength, range of motion, balance, coordination, functional technique, and activity tolerance ·  to  
· decrease assistance required with all functional mobility · . Reason for Services/Other Comments: 
· Patient continues to require skilled intervention due to · medical complications and patient unable to attend/participate in therapy as expected · . Use of outcome tool(s) and clinical judgement create a POC that gives a: Questionable prediction of patient's progress: MODERATE COMPLEXITY  
  
 
 
 
TREATMENT:  
(In addition to Assessment/Re-Assessment sessions the following treatments were rendered) Pre-treatment Symptoms/Complaints: \"ok\" Pain: Initial: 0 FLACC Pain Intensity 1: 0  Post Session:  0/10 FLACC Therapeutic Activity: (    15 minutes): Therapeutic activities including Bed transfers, sitting EOB maintaining balance and sit to stand and standing in standing frame to improve mobility, strength, balance and and standing tolerance. Required max assist with standing frame   to promote static balance in standing. Therapeutic Exercise: (15 Minutes):  Exercises per grid below to improve mobility, strength and endurance. Required minimal visual and verbal cues to promote proper body mechanics. Progressed complexity of movement as indicated. DATE: 1/26/20 1/29/20 2/3/20 2/4/20 Straight leg raise Hip abduct/ adduct X5 AAB Heel slides  X3 AB  10 x 2 B Hip external/ internal rotation Ankle dorsiflexion/ plantarflexion X5 AB 25 10 x 2 B Sitting unsupported x5'       
LAQ  25  10x B unsupported    
marching  25  10x supported 5x B unsupported Seated AP    10x B Seated hip abd    10x B Key:  A=active, AA=active assisted, P=passive, B=bilaterally, R=right, L=left Braces/Orthotics/Lines/Etc:  
· Wound vac · O2 Device: Heated, Hi flow nasal cannula Treatment/Session Assessment:   
· Response to Treatment:  See above. · Interdisciplinary Collaboration:  
o Physical Therapy Assistant 
o Registered Nurse · After treatment position/precautions:  
o Up in chair 
o Bed/Chair-wheels locked 
o Bed in low position 
o Nurse at bedside · Compliance with Program/Exercises: Compliant all of the time · Recommendations/Intent for next treatment session: \"Next visit will focus on advancements to more challenging activities and reduction in assistance provided\". Total Treatment Duration: PT Patient Time In/Time Out Time In: 7597 Time Out: 1515 Lucia Chamorro, PTA

## 2020-02-04 NOTE — PROGRESS NOTES
Warfarin dosing per pharmacist 
 
Wilda Castano is a 69 y.o. male. Height: 5' 10\" (177.8 cm)    Weight: 122 kg (269 lb) Indication:  AVR + Afib Goal INR:  2.5-3.5 Home dose:  New start Risk factors or significant drug interactions:  amiodarone Other anticoagulants:  Argatroban (currently stopped for Permacath placement) Daily Monitoring Date  INR     Warfarin dose HGB              Notes 2/4  1.4  2.5 mg  7.8 Argatroban is currently stopped for permacath placement. Argatroban should be restarted post permacath placement to bridge patient during warfarin initiation. Will start with lower dose of 2.5mg daily due to age, amiodarone interaction and concurrent argatroban. Pharmacy will follow Daily INR and make adjustments as needed Thank you, Genevieve Munguia Clinical Pharmacist 
[phone]

## 2020-02-04 NOTE — PROGRESS NOTES
Wilda Castano Admission Date: 1/10/2020 Daily Progress Note: 2/4/2020 The patient's chart is reviewed and the patient is discussed with the staff. CABG x 3 and AVR on 1/10.  Slow to improve. Had small ptx that self resolved. 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 Subjective:  
Down to 4L O2,  on argatraban- for permanent HD cath placement today Current Facility-Administered Medications Medication Dose Route Frequency  insulin glargine (LANTUS) injection 10 Units  10 Units SubCUTAneous DAILY  insulin lispro (HUMALOG) injection   SubCUTAneous AC&HS  
 albuterol-ipratropium (DUO-NEB) 2.5 MG-0.5 MG/3 ML  3 mL Nebulization Q4H PRN  
 insulin glargine (LANTUS) injection 10 Units  10 Units SubCUTAneous QHS  albuterol-ipratropium (DUO-NEB) 2.5 MG-0.5 MG/3 ML  3 mL Nebulization QID RT  
 amiodarone (CORDARONE) tablet 400 mg  400 mg Oral Q12H  
 famotidine (PEPCID) tablet 20 mg  20 mg Oral DAILY  nitroglycerin (NITROBID) 2 % ointment 1 Inch  1 Inch Topical TID  ondansetron (ZOFRAN) injection 4 mg  4 mg IntraVENous Q6H PRN  
 busPIRone (BUSPAR) tablet 10 mg  10 mg Oral TID  morphine 10 mg/ml injection 5 mg  5 mg IntraVENous Q4H PRN  
 oxyCODONE-acetaminophen (PERCOCET) 5-325 mg per tablet 1 Tab  1 Tab Per NG tube Q4H PRN  
 traMADol (ULTRAM) tablet 50 mg  50 mg Oral Q6H PRN  
 epoetin maritza-epbx (RETACRIT) 14,000 Units combo injection  14,000 Units SubCUTAneous Q7D  
 argatroban 50 mg in 0.9% sodium chloride 50 mL (1000 mcg/mL) infusion  0.5-10 mcg/kg/min IntraVENous TITRATE  alcohol 62% (NOZIN) nasal  1 Ampule  1 Ampule Topical Q12H  
 NUTRITIONAL SUPPORT ELECTROLYTE PRN ORDERS   Does Not Apply PRN  
 acetaminophen (TYLENOL) solution 650 mg  650 mg Per NG tube Q4H PRN  
  sodium chloride (NS) flush 5-40 mL  5-40 mL IntraVENous Q8H  
 sodium chloride (NS) flush 5-40 mL  5-40 mL IntraVENous PRN  
 naloxone (NARCAN) injection 0.4 mg  0.4 mg IntraVENous PRN  
 [Held by provider] metoprolol tartrate (LOPRESSOR) tablet 25 mg  25 mg Oral Q12H  
 atorvastatin (LIPITOR) tablet 80 mg  80 mg Oral QHS  dextrose (D50W) injection syrg 12.5 g  25 mL IntraVENous PRN  
 [Held by provider] aspirin chewable tablet 81 mg  81 mg Oral DAILY  magnesium sulfate 1 g/100 ml IVPB (premix or compounded)  1 g IntraVENous PRN  
 sodium bicarbonate (8.4%) injection 50 mEq  50 mEq IntraVENous PRN Review of Systems Constitutional: negative for fever, chills, sweats Cardiovascular: negative for chest pain, palpitations, syncope, edema Gastrointestinal:  negative for dysphagia, reflux, vomiting, diarrhea, abdominal pain, or melena Neurologic:  negative for focal weakness, numbness, headache Objective:  
 
Vitals:  
 02/04/20 0200 02/04/20 0300 02/04/20 0420 02/04/20 0500 BP: 106/55 114/57 127/60 123/56 Pulse: 81 75 80 75 Resp: (!) 31 20 16 27 Temp:  98.5 °F (36.9 °C) SpO2: 95% 94% 93% 98% Weight:      
Height:      
 
 
 
Intake/Output Summary (Last 24 hours) at 2/4/2020 7209 Last data filed at 2/3/2020 2300 Gross per 24 hour Intake 3.36 ml Output 3845 ml Net -3841.64 ml Physical Exam:  
Constitution:  the patient is obese and in no acute distress EENMT:  Sclera clear, pupils equal, oral mucosa moist 
Respiratory: CTA Cardiovascular:  RRR without M,G,R 
Gastrointestinal: soft and non-tender; with positive bowel sounds. Musculoskeletal: warm without cyanosis. There is 1+ lower extremity edema. Skin:  no jaundice or rashes, sternal wounds , necrotoic toes bilaterally and index finger tips R and L Neurologic: no gross neuro deficits Psychiatric:  alert and responsive CXR:  
 
 
LAB Recent Labs 02/03/20 
2138 02/03/20 
1618 02/03/20 
1126 02/03/20 7241 02/02/20 2031 GLUCPOC 233* 215* 131* 218* 260* Recent Labs 02/04/20 
0309 02/03/20 
1029 02/02/20 
0692 WBC 10.1 9.9 10.3 HGB 7.8* 8.0* 8.2* HCT 25.8* 26.0* 27.2*  
* 136* 114* Recent Labs 02/04/20 
0309 02/03/20 
0763 02/02/20 
2089 * 134* 136  
K 4.7 4.8 4.6 CL 98 99 100 CO2 29 29 28 * 262* 221* BUN 47* 57* 44* CREA 5.69* 5.97* 4.86* MG  --  2.4  --   
CA 8.2* 8.1* 8.1*  
PHOS  --  5.5*  -- No results for input(s): PH, PCO2, PO2, HCO3, PHI, PCO2I, PO2I, HCO3I in the last 72 hours. No results for input(s): LCAD, LAC in the last 72 hours. 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) ICD-10-CM: I48.91 
ICD-9-CM: 427.31  1/13/2020 Unknown Acute renal failure (ARF) (HCC) ICD-10-CM: N17.9 ICD-9-CM: 584.9  1/11/2020 Unknown Aortic valve stenosis ICD-10-CM: I35.0 ICD-9-CM: 424.1  1/10/2020 Unknown CAD (coronary artery disease) ICD-10-CM: I25.10 ICD-9-CM: 414.00  1/10/2020 Unknown Weaning from respirator [facility]) ICD-10-CM: Z99.11 ICD-9-CM: V46.13  1/10/2020 Unknown Acute hypoxemic respiratory failure ([facility] [address] ICD-10-CM: J96.01 
ICD-9-CM: 518.81  1/10/2020 * (Principal) S/P CABG x 3 ICD-10-CM: Z95.1 ICD-9-CM: V45.81  1/10/2020 Unknown S/P AVR ICD-10-CM: Z95.2 ICD-9-CM: V43.3  1/10/2020 Unknown DM type 2 (diabetes mellitus, type 2) (HCC) ICD-10-CM: E11.9 ICD-9-CM: 250.00  1/14/2013 Yes Plan:  (Medical Decision Making) -- for HD catheter today 
--IS as tolerated 
--Mobilze if and when able 
--likely out of ICU today- to the discretion of primary service. More than 50% of the time documented was spent in face-to-face contact with the patient and in the care of the patient on the floor/unit where the patient is located.  
 
Martinez Barnett MD

## 2020-02-04 NOTE — PROGRESS NOTES
TRANSFER - IN REPORT: 
 
Verbal report received from Avril Saucedo RN on Ruba Oas being received from IR for routine progression of care Report consisted of patients Situation, Background, Assessment and Recommendations(SBAR). Information from the following report(s) SBAR and Procedure Summary was reviewed with the receiving nurse. Opportunity for questions and clarification was provided. Assessment completed upon patients arrival to unit and care assumed.

## 2020-02-04 NOTE — PROGRESS NOTES
[facility] Nephrology Progress Note Follow-Up on: DEJUAN/CKD ROS: 
Gen - no fever, no chills, appetite unchanged CV - no chest pain, no palpitation Lung - + shortness of breath, no cough Abd - no tenderness, no nausea/vomiting, no diarrhea Ext - + edema Exam: 
Vitals:  
 02/04/20 0500 02/04/20 0600 02/04/20 0700 02/04/20 5686 BP: 123/56 136/63 142/69 Pulse: 75 74 84 Resp: 27 18 Temp:      
SpO2: 98% 96% 92% 96% Weight:      
Height:      
 
 
 
Intake/Output Summary (Last 24 hours) at 2/4/2020 8337 Last data filed at 2/3/2020 2300 Gross per 24 hour Intake  Output 3795 ml Net -3795 ml Wt Readings from Last 3 Encounters:  
02/04/20 122 kg (269 lb) 01/08/20 136.6 kg (301 lb 4 oz) 12/23/19 138.8 kg (306 lb) GEN - in no distress CV - regular, no murmur, no rub Lung - basilar rales bilaterally Abd - soft, nontender Ext - 1+ edema Recent Labs 02/04/20 
0309 02/03/20 
6559 02/02/20 
5617 WBC 10.1 9.9 10.3 HGB 7.8* 8.0* 8.2* HCT 25.8* 26.0* 27.2*  
* 136* 114* Recent Labs 02/04/20 
0309 02/03/20 
4479 02/02/20 
1080 * 134* 136  
K 4.7 4.8 4.6 CL 98 99 100 CO2 29 29 28 BUN 47* 57* 44* CREA 5.69* 5.97* 4.86* CA 8.2* 8.1* 8.1*  
* 262* 221* MG  --  2.4  --   
PHOS  --  5.5*  --   
 
 
Assessment / Plan: 
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 
  DM type 2 (diabetes mellitus, type 2) ([address] (1/14/2013) Aortic valve stenosis (1/10/2020) CAD (coronary artery disease) (1/10/2020) Weaning from respirator [facility]) (1/10/2020) Acute hypoxemic respiratory failure ([address] (1/10/2020) S/P AVR (1/10/2020) Acute renal failure (ARF) ([address] (1/11/2020) Atrial fibrillation ([address] (1/13/2020) Shock ([address] (1/15/2020) Bilateral pneumothorax (1/17/2020) Thrombocytopenia ([address] (1/17/2020) HIT (heparin-induced thrombocytopenia) ([address] (1/23/2020) Pleural effusion (2/3/2020) 1. DEJUAN/CKD - Previous baseline Cr around 1.8-1.9 
- No signs of renal recovery 
- HD in AM 
- Permacath ordered 2. S/p CABG 3. Volume overload 4. HIT+ - confirmed with SHARON

## 2020-02-04 NOTE — PROGRESS NOTES
REVISED GOALS and ONGOING 2/1/2020 all goals  : 
 
STG: 
(1.)Patient will roll side to side in bed with  MODERATE ASSIST within 3-5 day(s). (2.)Patient will move from supine to sit and sit to supine  in bed with  MODERATE ASSIST within 3-5 day(s). (3.)Patient will sit edge of bed/chair with STAND BY ASSIST and good balance statically  for 10 minutes within 3-5 day(s). 4.  Patient will perform 1 sets of 10 repetitions of active range of motion exercises for bilateral lower extremity(s) with  cueing within 3-5 day(s). 5.  Patient will tolerate sitting with bed in chair position for 2 hour 2x/day to facilitate tolerance to sitting in chair within 3-5 days. LTG: 
(1.)Patient will move from supine to sit and sit to supine  and roll side to side in bed with  MIN ASSIST  within 7-10 day(s). (2.)Patient will transfer from bed to chair and chair to bed with  MODERATE ASSIST using the least restrictive device within 7-10 day(s). (3.)Patient will stand with  MINIMAL ASSIST x 2 for 2 minutes with the least restrictive device within 7-10 day(s). 4.  Patient will exhibit 3/5 strength B LE's to facilitate increased independence with bed mobility within 7-10 days. PHYSICAL THERAPY: Daily Note and AM 2/4/2020 INPATIENT: PT Visit Days : 4 Payor: Alexander Melchor / Plan: Hima Melchor / Product Type: [employer] /   
  
NAME/AGE/GENDER: Yariel Maher is a 69 y.o. male PRIMARY DIAGNOSIS: Atherosclerosis of native coronary artery of native heart with unstable angina pectoris ([facility] [address] [I25.110] Nonrheumatic aortic valve stenosis [I35.0] CAD (coronary artery disease) [I25.10] Aortic valve stenosis [I35.0] S/P CABG x 3 S/P CABG x 3 Procedure(s) (LRB): ULTRASOUND on THinners (Bilateral) 1 Day Post-Op ICD-10: Treatment Diagnosis:  
 · Other abnormalities of gait and mobility (R26.89) Precaution/Allergies: Heparin; Amoxicillin; Keflex [cephalexin]; and Pcn [penicillins] ASSESSMENT:  
 
Mr. Cee Diaz underwent above surgery on 1/10/20 and was only recently extubated. He lives with his wife and ambulates independently with cane or RW at baseline. 2/4/20- Pt is in bed upon arrival, alert and willing to try and stand. He got to the EOB with moderate assist from  and had good sitting balance EOB. Using the standing frame he stood 3 times. He stood about 5 minutes the first time and tolerated fairly. The second time he passed out with likely low BP but recovered well. The third time he tolerated fairly and was only about 1 minutes standing. Progress made and will continue efforts. This section established at most recent assessment PROBLEM LIST (Impairments causing functional limitations): 1. Decreased Strength 2. Decreased ADL/Functional Activities 3. Decreased Transfer Abilities 4. Decreased Ambulation Ability/Technique 5. Decreased Balance 6. Decreased Activity Tolerance 7. Increased Fatigue 8. Increased Shortness of Breath 9. Decreased Knowledge of Precautions INTERVENTIONS PLANNED: (Benefits and precautions of physical therapy have been discussed with the patient.) 1. Balance Exercise 2. Bed Mobility 3. Neuromuscular Re-education/Strengthening 4. Therapeutic Activites 5. Therapeutic Exercise/Strengthening 6. Transfer Training 7. education TREATMENT PLAN: Frequency/Duration: twice daily for duration of hospital stay Rehabilitation Potential For Stated Goals: Good REHAB RECOMMENDATIONS (at time of discharge pending progress):   
Placement: It is my opinion, based on this patient's performance to date, that Mr. Cee Diaz may benefit from intensive therapy at a [address] [place] [address] after discharge due to the functional deficits listed above that are likely to improve with skilled rehabilitation and severe debility. Equipment:  
? tbd  
? None at this time HISTORY:  
History of Present Injury/Illness (Reason for Referral): 
Patient admitted for above surgery. Past Medical History/Comorbidities:  
Mr. Jes Flores  has a past medical history of Arthritis, BPH (benign prostatic hyperplasia) (1/14/2013), CAD (coronary artery disease), DM type 2 (diabetes mellitus, type 2) ([facility] [address] (dx 2004), Dyspnea, Gout (1/14/2013), HLD (hyperlipidemia) (1/14/2013), HTN (hypertension), Morbid obesity ([facility] [address] (9/3/14), Psychiatric disorder, Rheumatic fever, Seasonal allergic rhinitis, Severe aortic valve stenosis, Thyroid disease, and Unspecified sleep apnea (2016). Mr. Jes Flores  has a past surgical history that includes hx tonsil and adenoidectomy; hx heart catheterization (12/23/2019); hx orthopaedic (Left); and hx cataract removal (Bilateral, 2012). Social History/Living Environment:  
Home Environment: Private residence # Steps to Enter: 1 One/Two Story Residence: One story Living Alone: No 
Support Systems: Spouse/Significant Other/Partner Patient Expects to be Discharged to[de-identified] Private residence Current DME Used/Available at Home: Cane, straight, Shower chair Tub or Shower Type: Shower Prior Level of Function/Work/Activity: 
 
He lives with his wife and ambulates independently with cane or RW at baseline. Number of Personal Factors/Comorbidities that affect the Plan of Care: 3+: HIGH COMPLEXITY EXAMINATION:  
Most Recent Physical Functioning:  
Gross Assessment: 
  
         
  
Posture: 
  
Balance: 
  Bed Mobility: 
Supine to Sit: Moderate assistance Sit to Supine: Moderate assistance;Assist x2 Wheelchair Mobility: 
  
Transfers: 
Sit to Stand: (standing frame 3x) Gait: 
  
   
  
Body Structures Involved: 1. Heart 2. Lungs 3. Muscles Body Functions Affected: 1. Cardio 2. Respiratory 3. Neuromusculoskeletal 
4. Movement Related Activities and Participation Affected: 1. Mobility 2. Self Care 3. Domestic Life 4. Community, Social and [place] Number of elements that affect the Plan of Care: 4+: HIGH COMPLEXITY CLINICAL PRESENTATION:  
Presentation: Evolving clinical presentation with changing clinical characteristics: MODERATE COMPLEXITY CLINICAL DECISION MAKIN [facility] [address] [address] AM-PAC 6 Clicks Basic Mobility Inpatient Short Form How much difficulty does the patient currently have. .. Unable A Lot A Little None 1. Turning over in bed (including adjusting bedclothes, sheets and blankets)? [x] 1   [] 2   [] 3   [] 4  
2. Sitting down on and standing up from a chair with arms ( e.g., wheelchair, bedside commode, etc.)   [x] 1   [] 2   [] 3   [] 4  
3. Moving from lying on back to sitting on the side of the bed? [x] 1   [] 2   [] 3   [] 4 How much help from another person does the patient currently need. .. Total A Lot A Little None 4. Moving to and from a bed to a chair (including a wheelchair)? [x] 1   [] 2   [] 3   [] 4  
5. Need to walk in hospital room? [x] 1   [] 2   [] 3   [] 4  
6. Climbing 3-5 steps with a railing? [x] 1   [] 2   [] 3   [] 4  
© , Trustees of [address] [facility] [address] [address], under license to [employer]. All rights reserved Score:  Initial: 6 Most Recent: X (Date: -- ) Interpretation of Tool:  Represents activities that are increasingly more difficult (i.e. Bed mobility, Transfers, Gait). Medical Necessity:    
· Patient is expected to demonstrate progress in  
· strength, range of motion, balance, coordination, functional technique, and activity tolerance ·  to  
· decrease assistance required with all functional mobility · . Reason for Services/Other Comments: 
· Patient continues to require skilled intervention due to · medical complications and patient unable to attend/participate in therapy as expected · . Use of outcome tool(s) and clinical judgement create a POC that gives a: Questionable prediction of patient's progress: MODERATE COMPLEXITY  
  
 
 
 
TREATMENT:  
 (In addition to Assessment/Re-Assessment sessions the following treatments were rendered) Pre-treatment Symptoms/Complaints: \"What are we going to do? \"  
Pain: Initial: 0 FLACC Pain Intensity 1: 0  Post Session:  0/10 FLACC Therapeutic Activity: (    25 minutes): Therapeutic activities including Bed transfers and sit to stand and standing in standing frame to improve mobility, strength, balance and and standing tolerance. Required total assist with standing frame   to promote static balance in standing. DATE: 1/26/20 1/29/20 2/3/20 Straight leg raise Hip abduct/ adduct X5 AAB Heel slides  X3 AB  10 x 2 B Hip external/ internal rotation Ankle dorsiflexion/ plantarflexion X5 AB 25 10 x 2 B Sitting unsupported x5'       
LAQ  25 marching 25 Key:  A=active, AA=active assisted, P=passive, B=bilaterally, R=right, L=left Braces/Orthotics/Lines/Etc:  
· Wound vac Treatment/Session Assessment:   
· Response to Treatment:  See above. · Interdisciplinary Collaboration:  
o Physical Therapy Assistant 
o Registered Nurse · After treatment position/precautions:  
o Supine in bed 
o Bed/Chair-wheels locked 
o Bed in low position 
o Nurse at bedside · Compliance with Program/Exercises: Compliant all of the time · Recommendations/Intent for next treatment session: \"Next visit will focus on advancements to more challenging activities and reduction in assistance provided\". Total Treatment Duration: PT Patient Time In/Time Out Time In: 0915 Time Out: 3566 Ayla Robin PTA

## 2020-02-04 NOTE — PROGRESS NOTES
A follow up visit was made to the patient. Emotional support, spiritual presence and  
prayer were provided for the patient. EDMOND Frank

## 2020-02-04 NOTE — PROGRESS NOTES
TRANSFER - OUT REPORT: 
 
Verbal report given to Dajaenmanuel Lomira on [facility]  being transferred to CVICU for routine post - op Report consisted of patients Situation, Background, Assessment and  
Recommendations(SBAR). Per Neri Levi, pt to restart Argatroban in 4 hours. Information from the following report(s) Procedure Summary was reviewed with the receiving nurse. Lines:  
Peripheral IV 01/30/20 Inner;Left Forearm (Active) Site Assessment Clean, dry, & intact 2/4/2020  3:00 AM  
Phlebitis Assessment 0 2/4/2020  3:00 AM  
Infiltration Assessment 0 2/4/2020  3:00 AM  
Dressing Status Clean, dry, & intact 2/4/2020  3:00 AM  
Dressing Type Transparent;Tape 2/4/2020  3:00 AM  
Hub Color/Line Status Capped; Patent 2/4/2020  3:00 AM  
Action Taken Open ports on tubing capped 2/1/2020  3:00 PM  
Alcohol Cap Used No 2/3/2020  3:00 PM  
  
 
Opportunity for questions and clarification was provided. Patient transported with: 
 Monitor O2 @ 10 liters Registered Nurse Tech

## 2020-02-04 NOTE — PROCEDURES
Department of Interventional Radiology 
[phone] Interventional Radiology Brief Procedure Note Patient: Radha Hess MRN: [id number]  SSN: [id number] YOB: 1945  Age: 69 y.o. Sex: male Date of Procedure: 2/4/2020 Pre-Procedure Diagnosis: DEJUAN/CKD Post-Procedure Diagnosis: SAME Procedure(s): Tunneled Central Venous Catheter Brief Description of Procedure: US, fluoro guided left IJ tunneled HD placed Performed By: Zion Patel PA-C Assistants: None Anesthesia:Lidocaine Estimated Blood Loss: Less than 10ml Specimens:  None Implants:  Tunnelled Hemodialysis Catheter Findings: catheter tip in right atrium Complications: None Recommendations: ok to use catheter Follow Up: referring MD 
 
Signed By: Zion Patel PA-C February 4, 2020

## 2020-02-04 NOTE — PROGRESS NOTES
Bedside and verbal report received from [facility]. Assisted on bedpan. Pt panicked, \"I cant breathe\", HOB elevated due to SOB, O2 sat greater than 92%

## 2020-02-04 NOTE — PROGRESS NOTES
TRANSFER - OUT REPORT: 
 
Verbal report given to IR, RN on Cally Gandhi  being transferred to IR for ordered procedure Report consisted of patients Situation, Background, Assessment and Recommendations(SBAR). Information from the following report(s) SBAR, MAR and Recent Results was reviewed with the receiving nurse. Opportunity for questions and clarification was provided.

## 2020-02-05 NOTE — PROGRESS NOTES
TRANSFER - OUT REPORT: 
 
Verbal report given to Magy Rodgers RN on Ruba Richardson  being transferred to 2232 for routine progression of care Report consisted of patients Situation, Background, Assessment and  
Recommendations(SBAR). Information from the following report(s) SBAR, OR Summary, Procedure Summary, Intake/Output, MAR and Cardiac Rhythm NSR with BBB was reviewed with the receiving nurse. Lines:  
Peripheral IV 01/30/20 Inner;Left Forearm (Active) Site Assessment Clean, dry, & intact 2/5/2020 11:33 AM  
Phlebitis Assessment 0 2/5/2020 11:33 AM  
Infiltration Assessment 0 2/5/2020 11:33 AM  
Dressing Status Clean, dry, & intact 2/5/2020 11:33 AM  
Dressing Type Transparent;Tape 2/5/2020 11:33 AM  
Hub Color/Line Status Pink; Infusing;Patent 2/5/2020 11:33 AM  
Action Taken Open ports on tubing capped 2/4/2020  3:39 PM  
Alcohol Cap Used No 2/5/2020 11:33 AM  
  
 
Opportunity for questions and clarification was provided. Patient transported with: 
 Monitor O2 @ 4 liters Registered Nurse Patient to go to room 2232 after Hemodialysis.

## 2020-02-05 NOTE — PROGRESS NOTES
Bedside verbal shift report received from Azra Thomas, [address] [facility]

## 2020-02-05 NOTE — PROGRESS NOTES
Patient is progressing well with mobility, however pt continues to resist or shift himself out of turned positions to alleviate pressure off of his buttocks and sacrum. The patient has been educated extensively, and continues to shift himself flat on his back. Nursing staff will continue to attempt to offload patient's posterior surface.

## 2020-02-05 NOTE — PROGRESS NOTES
[facility] Progress Note Follow-Up on: DEJUAN/CKD Patient seen and examined on HD, dialyzing via left  Qb, UF 4100 complaints of SOB and orthopnea, tolerating UF. Labs and chart reviewed ROS: 
Gen - no fever, no chills, appetite unchanged CV - no chest pain, no palpitation Lung - + shortness of breath, + orthopnea, no cough Abd - no tenderness, no nausea/vomiting, no diarrhea Ext - + edema Exam: 
Vitals:  
 02/05/20 1133 02/05/20 1137 02/05/20 1233 02/05/20 1243 BP: 108/56   (!) 147/94 Pulse: 84  81 90 Resp: 23  20 Temp:      
SpO2:  97% Weight:      
Height:      
 
 
 
Intake/Output Summary (Last 24 hours) at 2/5/2020 1310 Last data filed at 2/5/2020 0930 Gross per 24 hour Intake 1123.92 ml Output 25 ml Net 1098.92 ml Wt Readings from Last 3 Encounters:  
02/05/20 120.1 kg (264 lb 12.8 oz) 01/08/20 136.6 kg (301 lb 4 oz) 12/23/19 138.8 kg (306 lb) GEN - in no distress, alert and oriented CV - regular, no murmur, no rub Lung - basilar rales bilaterally Abd - soft, nontender, + BS Ext - 2+ BLE edema Access - left TCC- intact Recent Labs 02/05/20 
6795 02/04/20 
9027 02/04/20 
0309 02/03/20 
8892 WBC 16.6*  --  10.1 9.9 HGB 8.3*  --  7.8* 8.0*  
HCT 26.8*  --  25.8* 26.0*  
  --  144* 136* INR 2.3 1.4  --   --   
  
 
Recent Labs 02/05/20 
8456 02/04/20 
0309 02/03/20 2015  134* 134* K 4.6 4.7 4.8  
 98 99 CO2 29 29 29 BUN 61* 47* 57* CREA 6.92* 5.69* 5.97* CA 7.9* 8.2* 8.1*  
* 254* 262* MG 2.4  --  2.4 PHOS 5.1*  --  5.5* Assessment / Plan: 
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 
  DM type 2 (diabetes mellitus, type 2) ([facility] (1/14/2013) Aortic valve stenosis (1/10/2020) CAD (coronary artery disease) (1/10/2020) Acute hypoxemic respiratory failure ([facility] (1/10/2020) S/P AVR (1/10/2020) Acute renal failure (ARF) ([facility] [address] (1/11/2020) Atrial fibrillation ([facility] [address] (1/13/2020) Shock ([facility] [address] (1/15/2020) HIT (heparin-induced thrombocytopenia) ([facility] (1/23/2020) Pleural effusion (2/3/2020) 1. DEJUAN/CKD - Previous baseline Cr around 1.8-1.9 
- No signs of renal recovery 
-seen on HD, tolerating UF, catheter functioning well  
2. S/p CABG 3. Volume overload- UF as tolerated 4. 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 Anemia- monitoring

## 2020-02-05 NOTE — PROGRESS NOTES
POD 2 Days Post-Op Procedure:  Procedure(s): ULTRASOUND on THinners Subjective:  
 
Patient has No significant medical complaints Objective:  
 
Patient Vitals for the past 8 hrs: 
 BP Temp Pulse Resp SpO2  
20 0600   75 30 98 % 20 0500   73 20 100 % 20 0400   73 26 100 % 20 0303 113/76      
20 0301    25   
20 0300  97.6 °F (36.4 °C) 75  99 % 20 0200   75 (!) 52 96 % 20 0100   77 (!) 34 96 % 20 2318 114/56 98 °F (36.7 °C) 75 21 90 % Temp (24hrs), Av.6 °F (36.4 °C), Min:97 °F (36.1 °C), Max:98 °F (36.7 °C) Hemodynamics PAP Systolic: 50 PAP 
CO (l/min): 7.5 l/min CO 
CI (l/min/m2): 2.9 l/min/m2 CI No intake/output data recorded.  1901 -  0700 In: 600 [P.O.:600] Out: 100 [Urine:100] CT Drainage 
  
  
   
  total of all CT's Heart:  regular rate and rhythm, S1, S2 normal, no murmur, click, rub or gallop Lung:  clear to auscultation bilaterally Neuro: Grossly non focal 
Incisions: Clean, dry, and intact Labs: 
Recent Results (from the past 24 hour(s)) PROTHROMBIN TIME + INR Collection Time: 20  8:23 AM  
Result Value Ref Range Prothrombin time 17.6 (H) 12.0 - 14.7 sec INR 1.4 GLUCOSE, POC Collection Time: 20  8:33 AM  
Result Value Ref Range Glucose (POC) 238 (H) 65 - 100 mg/dL GLUCOSE, POC Collection Time: 20 12:04 PM  
Result Value Ref Range Glucose (POC) 257 (H) 65 - 100 mg/dL PTT Collection Time: 20  3:24 PM  
Result Value Ref Range aPTT 31.1 24.3 - 35.4 SEC GLUCOSE, POC Collection Time: 20  4:11 PM  
Result Value Ref Range Glucose (POC) 188 (H) 65 - 100 mg/dL PTT Collection Time: 20  6:28 PM  
Result Value Ref Range aPTT 45.5 (H) 24.3 - 35.4 SEC GLUCOSE, POC Collection Time: 20  9:32 PM  
Result Value Ref Range Glucose (POC) 240 (H) 65 - 100 mg/dL PTT Collection Time: 02/04/20 11:49 PM  
Result Value Ref Range aPTT 59.8 (H) 24.3 - 35.4 SEC MAGNESIUM Collection Time: 02/05/20  3:16 AM  
Result Value Ref Range Magnesium 2.4 1.8 - 2.4 mg/dL METABOLIC PANEL, BASIC Collection Time: 02/05/20  3:16 AM  
Result Value Ref Range Sodium 136 136 - 145 mmol/L Potassium 4.6 3.5 - 5.1 mmol/L Chloride 100 98 - 107 mmol/L  
 CO2 29 21 - 32 mmol/L Anion gap 7 7 - 16 mmol/L Glucose 174 (H) 65 - 100 mg/dL BUN 61 (H) 8 - 23 MG/DL Creatinine 6.92 (H) 0.8 - 1.5 MG/DL  
 GFR est AA 10 (L) >60 ml/min/1.73m2 GFR est non-AA 8 (L) >60 ml/min/1.73m2 Calcium 7.9 (L) 8.3 - 10.4 MG/DL  
PTT Collection Time: 02/05/20  3:16 AM  
Result Value Ref Range aPTT 60.0 (H) 24.3 - 35.4 SEC PHOSPHORUS Collection Time: 02/05/20  3:16 AM  
Result Value Ref Range Phosphorus 5.1 (H) 2.3 - 3.7 MG/DL  
CBC W/O DIFF Collection Time: 02/05/20  3:16 AM  
Result Value Ref Range WBC 16.6 (H) 4.3 - 11.1 K/uL  
 RBC 2.79 (L) 4.23 - 5.6 M/uL HGB 8.3 (L) 13.6 - 17.2 g/dL HCT 26.8 (L) 41.1 - 50.3 % MCV 96.1 79.6 - 97.8 FL  
 MCH 29.7 26.1 - 32.9 PG  
 MCHC 31.0 (L) 31.4 - 35.0 g/dL  
 RDW 14.9 (H) 11.9 - 14.6 % PLATELET 316 716 - 128 K/uL MPV 10.6 9.4 - 12.3 FL ABSOLUTE NRBC 0.00 0.0 - 0.2 K/uL PROTHROMBIN TIME + INR Collection Time: 02/05/20  3:16 AM  
Result Value Ref Range Prothrombin time 25.8 (H) 12.0 - 14.7 sec INR 2.3 Assessment:  
 
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 
  DM type 2 (diabetes mellitus, type 2) ([address] (1/14/2013) Aortic valve stenosis (1/10/2020) CAD (coronary artery disease) (1/10/2020) Acute hypoxemic respiratory failure ([address] (1/10/2020) S/P AVR (1/10/2020) Acute renal failure (ARF) ([address] (1/11/2020) Atrial fibrillation ([address] (1/13/2020) Shock ([address] (1/15/2020) HIT (heparin-induced thrombocytopenia) ([address] (1/23/2020) Pleural effusion (2/3/2020) Plan/Recommendations/Medical Decision Making:  
 
INR 2.3, ? Stop Argat, to floor, protocol See orders

## 2020-02-05 NOTE — PROGRESS NOTES
Rita Ambrosio Admission Date: 1/10/2020 Daily Progress Note: 2/5/2020 The patient's chart is reviewed and the patient is discussed with the staff. CABG x 3 and AVR on 1/10.  Slow to improve. Had small ptx that self resolved. 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 Subjective:  
 
on argatroban drip   
permanent HD cath was placed On NC 4 LPM 
 
Current Facility-Administered Medications Medication Dose Route Frequency  warfarin (COUMADIN) tablet 2.5 mg  2.5 mg Oral QPM  
 insulin glargine (LANTUS) injection 15 Units  15 Units SubCUTAneous DAILY  insulin glargine (LANTUS) injection 15 Units  15 Units SubCUTAneous QHS  insulin lispro (HUMALOG) injection   SubCUTAneous AC&HS  
 albuterol-ipratropium (DUO-NEB) 2.5 MG-0.5 MG/3 ML  3 mL Nebulization Q4H PRN  
 albuterol-ipratropium (DUO-NEB) 2.5 MG-0.5 MG/3 ML  3 mL Nebulization QID RT  
 amiodarone (CORDARONE) tablet 400 mg  400 mg Oral Q12H  
 famotidine (PEPCID) tablet 20 mg  20 mg Oral DAILY  nitroglycerin (NITROBID) 2 % ointment 1 Inch  1 Inch Topical TID  ondansetron (ZOFRAN) injection 4 mg  4 mg IntraVENous Q6H PRN  
 busPIRone (BUSPAR) tablet 10 mg  10 mg Oral TID  morphine 10 mg/ml injection 5 mg  5 mg IntraVENous Q4H PRN  
 oxyCODONE-acetaminophen (PERCOCET) 5-325 mg per tablet 1 Tab  1 Tab Per NG tube Q4H PRN  
 traMADol (ULTRAM) tablet 50 mg  50 mg Oral Q6H PRN  
 epoetin maritza-epbx (RETACRIT) 14,000 Units combo injection  14,000 Units SubCUTAneous Q7D  
 argatroban 50 mg in 0.9% sodium chloride 50 mL (1000 mcg/mL) infusion  0.5-10 mcg/kg/min IntraVENous TITRATE  alcohol 62% (NOZIN) nasal  1 Ampule  1 Ampule Topical Q12H  
 NUTRITIONAL SUPPORT ELECTROLYTE PRN ORDERS   Does Not Apply PRN  
  acetaminophen (TYLENOL) solution 650 mg  650 mg Per NG tube Q4H PRN  
 sodium chloride (NS) flush 5-40 mL  5-40 mL IntraVENous Q8H  
 sodium chloride (NS) flush 5-40 mL  5-40 mL IntraVENous PRN  
 naloxone (NARCAN) injection 0.4 mg  0.4 mg IntraVENous PRN  
 [Held by provider] metoprolol tartrate (LOPRESSOR) tablet 25 mg  25 mg Oral Q12H  
 atorvastatin (LIPITOR) tablet 80 mg  80 mg Oral QHS  dextrose (D50W) injection syrg 12.5 g  25 mL IntraVENous PRN  
 [Held by provider] aspirin chewable tablet 81 mg  81 mg Oral DAILY  magnesium sulfate 1 g/100 ml IVPB (premix or compounded)  1 g IntraVENous PRN  
 sodium bicarbonate (8.4%) injection 50 mEq  50 mEq IntraVENous PRN Review of Systems Constitutional: negative for fever, chills, sweats Cardiovascular: negative for chest pain, palpitations, syncope, edema Gastrointestinal:  negative for dysphagia, reflux, vomiting, diarrhea, abdominal pain, or melena Neurologic:  negative for focal weakness, numbness, headache Objective:  
 
Vitals:  
 02/05/20 0100 02/05/20 0200 02/05/20 0300 02/05/20 0301 BP:      
Pulse: 77 75 75 Resp: (!) 34 (!) 52  25 Temp:   97.6 °F (36.4 °C) SpO2: 96% 96% 99% Weight:      
Height:      
 
 
 
Intake/Output Summary (Last 24 hours) at 2/5/2020 0545 Last data filed at 2/5/2020 0300 Gross per 24 hour Intake 600 ml Output 25 ml Net 575 ml Physical Exam:  
Constitution:  the patient is obese and in no acute distress EENMT:  Sclera clear, pupils equal, oral mucosa moist 
Respiratory: CTA Cardiovascular:  RRR without M,G,R 
Gastrointestinal: soft and non-tender; with positive bowel sounds. Musculoskeletal: warm without cyanosis. There is 1+ lower extremity edema. Skin:  no jaundice or rashes, sternal wounds , necrotoic toes bilaterally and index finger tips R and L Neurologic: no gross neuro deficits Psychiatric:  alert and responsive CXR:  
 
 
LAB Recent Labs 02/04/20 2132 02/04/20 
1611 02/04/20 
1204 02/04/20 
7126 02/03/20 
2138 GLUCPOC 240* 188* 257* 238* 233* Recent Labs 02/05/20 
4733 02/04/20 
5513 02/04/20 
0309 02/03/20 
3227 WBC 16.6*  --  10.1 9.9 HGB 8.3*  --  7.8* 8.0*  
HCT 26.8*  --  25.8* 26.0*  
  --  144* 136* INR 2.3 1.4  --   --   
 
Recent Labs 02/05/20 
8404 02/04/20 
0309 02/03/20 
9049  134* 134* K 4.6 4.7 4.8  
 98 99 CO2 29 29 29 * 254* 262* BUN 61* 47* 57* CREA 6.92* 5.69* 5.97* MG 2.4  --  2.4 CA 7.9* 8.2* 8.1*  
PHOS 5.1*  --  5.5* No results for input(s): PH, PCO2, PO2, HCO3, PHI, PCO2I, PO2I, HCO3I in the last 72 hours. No results for input(s): LCAD, LAC in the last 72 hours. Assessment:  (Medical Decision Making) Hospital Problems  Date Reviewed: 12/6/2019 Codes Class Noted POA Pleural effusion ICD-10-CM: J90 ICD-9-CM: 511.9  2/3/2020 Unknown HIT (heparin-induced thrombocytopenia) ([facility]) ICD-10-CM: D05.39 ICD-9-CM: 289.84  1/23/2020 Unknown Shock ([facility] [address] ICD-10-CM: R57.9 ICD-9-CM: 785.50  1/15/2020 Unknown Atrial fibrillation [facility]) ICD-10-CM: I48.91 
ICD-9-CM: 427.31  1/13/2020 Unknown Acute renal failure (ARF) ([facility]) ICD-10-CM: N17.9 ICD-9-CM: 584.9  1/11/2020 Unknown Aortic valve stenosis ICD-10-CM: I35.0 ICD-9-CM: 424.1  1/10/2020 Unknown CAD (coronary artery disease) ICD-10-CM: I25.10 ICD-9-CM: 414.00  1/10/2020 Unknown Acute hypoxemic respiratory failure ([facility] [address] ICD-10-CM: J96.01 
ICD-9-CM: 518.81  1/10/2020 * (Principal) S/P CABG x 3 ICD-10-CM: Z95.1 ICD-9-CM: V45.81  1/10/2020 Unknown S/P AVR ICD-10-CM: Z95.2 ICD-9-CM: V43.3  1/10/2020 Unknown DM type 2 (diabetes mellitus, type 2) ([facility]) ICD-10-CM: E11.9 ICD-9-CM: 250.00  1/14/2013 Yes Plan:  (Medical Decision Making) --HD today 
--IS as tolerated 
--Mobilze if and when able --per renal and surgery --can go to telemetry if ok with surgery More than 50% of the time documented was spent in face-to-face contact with the patient and in the care of the patient on the floor/unit where the patient is located.  
 
Shira Potts MD

## 2020-02-05 NOTE — PROGRESS NOTES
Nitin Malik with wound care made aware of patient's wound vac coming off during night shift and current dressing of dry dressing and tape, states she will see patient on Stepdown Unit after Dialysis.

## 2020-02-05 NOTE — WOUND CARE
Call to [facility] in [place], asked to notify wound team when patient arrives from dialysis, if after normal business hours for wound team (4pm) then to apply dry dressing and tape to left thigh/ wound vac site and wound team will plan in am. Will monitor. [facility]  Liver Transplant  Progress Note    Patient Name: Jhonny Diana  MRN: [id number]  Admission Date: 2018  Hospital Length of Stay: 80 days  Code Status: Full Code  Primary Care Provider: Primary Doctor No  Post-Operative Day: 164    ORGAN:   LIVER  Disease Etiology: Acute Alcoholic Hepatitis  Donor Type:    - Brain Death  CDC High Risk:   No  Donor CMV Status:   Donor CMV Status: Positive  Donor HBcAB:   Negative  Donor HCV Status:   Negative  Whole or Partial: Whole Liver  Biliary Anastomosis: End to End  Arterial Anatomy: Standard  Subjective:     History of Present Illness:  Jhonny Diana is a 30 y/o male with past medical history of alcoholic cirrhosis.  S/p DDLT 10/19/2017; c/b seizures (swtiched off prograf to cyclo), ATN requiring HD (-W-, last 1/10, anuric), superficial wound infection s/p wound vac to chevron incision, and multiple admissions for fevers on  (discharged on empiric augmentin for suspected superficial wound infection), readmitted  again with fever, and 12/3. Found to have peritonitis in November (WBC 5000, 75% PNM) neg for bile leak. He was treated initially with vanc/cefepime. Repeat cell counts  with some improvement (WBC 1400, 45% PNM). He has undergone multiple paracenteses as well as abscess drainage of perihepatic fluid collections and treated with antimicrobial therapy but no positive cultures. Of note, biliary stricture also identified and ERCP performed on 2017 with sphincterotomy and biliary stent placed. Liver tests still have not normalized despite intervention, bilirubin and AP remain elevated. Other pertinent PMH current wound vac in place 2/2 wound infection, malnutrition requiring TPN for short course and ongoing hypoalbuminemia, and seizure activity while on prograf and has since been switched to cyclosporine without reoccurrence.  He presented to the ER for fever, abdominal pain, and N/V. He reports fever (103) for 1  day prior. Overnight, he developed N/V, reports small amount of green emesis with new left sided pain. He also endorses worsening SOB with exertion. He was scheduled as an outpatient for follow up paracentesis and IR drainage of fluid collection. CXR in ER shows large pleural effusion with subsegmental atelectasis. Infectious work up initiated in ER. His ANC is 900. Broad spectrum antibiotics initiated in ED. At admit, he denied chest pain, palpitations, diarrhea, constipation, back pain, or any sick contacts.    Hospital Course:  Unclear cause of ongoing fevers. Multiple infectious, diagnostic testing completed. Taken to OR for ex lap 2/6, loculated fluid collections found, cultures NGTD. Multiple blood cultures with NGTD. Thoracentesis 1/11, 1/18, 1/31, and 2/16, cultures NGTD. Paracentesis 1/11, 1/19, and 2/5, cultures NGTD. negative fungal markers/quant gold intermediate. Negative TTE.  IR drainage 1/11, cultures NGTD. Continued with intermittent fevers despite broad spectrum antibiotics that were initially started 1/11, then broadened antibiotics 2/22 from vanc/zosyn to ertapenem. Multiple CT scan of abd obtained that were unremarkable. Tagged WBCs scan 2/27 without source of infection found. He had several episodes of neutropenia requiring neupogen, last given 2/28. Mild CMV reactivation 2/1 (370 copies) with minimal viremia s/p treatment, valcyte d/cd 2/25 due to neutropenia. Monitoring weekly CMV PCR. Chimerism studies obtained 3/1 to assess for GVHD, which was negative, EBV PCR negative, adenovirus negative. In addition to fevers, he has consistently c/o abdominal pain and N/V. Tbili has remain elevated post-op requiring multiple ERCPs. ERCPs performed this admission on 1/30, 2/19, and 3/9. ERCPs with sludge and stones with stents placed. Last ERCP 3/9 with metal stent placed into CBD and stones removed. His ertapenem was d/cd 3/7 due to AMS. Following ERCP 3/9, he was started on cefepime and flagyl  for possible cholangitis. He has remained afebrile since 3/10. He completed cefepime/flagyl after 3 weeks on 3/19 & 3/20. Due to elevated enzymes, he had a liver biopsy 1/23 consistent with cholestatic hepatitis (no cultures obtained during biopsy). GI was consulted for persistent N/V and malnutrition. EGD performed 1/25 and 3/6, EGD 3/6 with normal esophagus congested, erythematous and nodular mucosa in the stomach, one duodenal ulcer with a clean ulcer base (Emir Class III), biopsies taken, negative for CMV and GVHD. Due to N/V and abdominal pain, multiple attempts were made to place SHANE tube with tube feeding, but patient did not tolerate TF. Therefore, he was transitioned to TPN 1/16 then again on 2/24. He also has chronic constipation. Small bowel follow through study with normal findings. Following EGD 1/25, he was started on a PPI, miralax, and Metamucil, but he did not tolerate meds. For appetite stimulation he was trialed on marinol. He also did well with reglan before meals and at bedtime. Early post-op liver transplant surgery, he was switched to cyclosporine for seizures. During this admission, he was transitioned back to prograf 2/24 with keppra on board. Starting 3/1 he developed stuttering speech, disorientation. CT head 3/1 and MRI head 3/6 unremarkable. EEG 3/8 with no seizure activity. He started on rapamune and stress dose steroids 3/10, then ultimately returned to cyclosporine 3/13 and rapamune d/cd same day. Since transitioning back to cyclosporine, his mental status has remained stable. During admission, patient was evaluated by [facility] for future kidney transplant, and he was deemed to be a suitable candidate once medically stable and cleared. He dialyzes on a M-W-F schedule. He was anuric on admission, but starting producing urine around 3/16.  Now producing >2-3L per day.       Interval History: No acute events overnight. Slowly trying to improve caloric intake, goal 300-500 jalen/day, goal is  to wean TPN once 700 jalen/day. Pt able to have full dinner 3/27, 3/28, 3/29 night- halved TPN.  Pt did not eat at all 3/30- increase back to full TPN.  Feeling much better 3/31 and able to eat throughout the day -  Decreased TPN to half.  Of note, abd xray obtained 3/31 for n/v - with distension.  Enema/suppository encouraged but pt felt he was having BM/gas on his own.  D/C Cellcept 3/31- with improvement in nausea- will continue to hold.  Continue reglan and marinol.  Last HD Monday 3/29 (plan is twice weekly until starts clearing). D/C Torsemide 50 mg daily 3/31, continue to hold today.  He remains afebrile off antibiotics. Monitor.     Scheduled Meds:   bisacodyl  10 mg Oral Daily    bisacodyl  10 mg Rectal Once    cycloSPORINE modified  125 mg Oral BID    docusate sodium  100 mg Oral BID    dronabinol  2.5 mg Oral QHS    epoetin maurisio (PROCRIT) injection  12,600 Units Intravenous Every Mon, Wed, Fri    ergocalciferol  50,000 Units Oral Q7 Days    fat emulsion 20%  250 mL Intravenous Once    heparin (porcine)  5,000 Units Subcutaneous Q8H    levetiracetam oral soln  500 mg Oral BID    levothyroxine  75 mcg Oral Before breakfast    lipase-protease-amylase 24,000-76,000-120,000 units  2 capsule Oral Daily    metoclopramide HCl  10 mg Oral QID (AC & HS)    mycophenolate  500 mg Oral BID    omeprazole  40 mg Oral QAM    polyethylene glycol  17 g Oral BID    predniSONE  10 mg Oral Daily    psyllium husk (aspartame)  3.4 g Oral BID    ursodiol  300 mg Oral BID     Continuous Infusions:   TPN ADULT CENTRAL LINE CUSTOM 45 mL/hr at 03/31/18 2202     PRN Meds:sodium chloride 0.9%, acetaminophen, albuterol-ipratropium 2.5mg-0.5mg/3mL, bisacodyl, butalbital-acetaminophen-caffeine -40 mg, dextrose 50%, dextrose 50%, diphenhydrAMINE-zinc acetate 1-0.1%, glucagon (human recombinant), glucose, glucose, heparin (porcine), naloxone, ondansetron, ondansetron, oxyCODONE, oxyCODONE, prochlorperazine,  prochlorperazine, simethicone, sodium chloride 0.9%, sodium chloride 0.9%    Review of Systems   Constitutional: Positive for activity change, appetite change and fatigue. Negative for chills and fever.   HENT: Negative for congestion and facial swelling.    Eyes: Negative for pain, discharge and visual disturbance.   Respiratory: Negative.  Negative for cough, chest tightness, shortness of breath and wheezing.    Cardiovascular: Negative.  Negative for chest pain, palpitations and leg swelling.   Gastrointestinal: Positive for abdominal distention and abdominal pain. Negative for constipation, diarrhea, nausea and vomiting.   Endocrine: Negative.    Genitourinary: Negative for decreased urine volume, difficulty urinating, dysuria, hematuria and urgency.   Musculoskeletal: Negative for back pain, gait problem, neck pain and neck stiffness.   Skin: Negative for color change and pallor.   Allergic/Immunologic: Positive for immunocompromised state.   Neurological: Negative for dizziness, seizures, weakness, light-headedness and headaches.   Psychiatric/Behavioral: Negative for behavioral problems, confusion, dysphoric mood, hallucinations, sleep disturbance and suicidal ideas. The patient is not nervous/anxious.      Objective:     Vital Signs (Most Recent):  Temp: 98.2 °F (36.8 °C) (04/01/18 0729)  Pulse: 74 (04/01/18 0729)  Resp: 16 (04/01/18 0729)  BP: (!) 141/87 (04/01/18 0729)  SpO2: 96 % (04/01/18 0729) Vital Signs (24h Range):  Temp:  [97.2 °F (36.2 °C)-98.9 °F (37.2 °C)] 98.2 °F (36.8 °C)  Pulse:  [68-92] 74  Resp:  [15-18] 16  SpO2:  [94 %-99 %] 96 %  BP: (137-144)/(79-87) 141/87     Weight: 63.5 kg (139 lb 15.9 oz)  Body mass index is 22.61 kg/m².    Intake/Output - Last 3 Shifts       03/30 0700 - 03/31 0659 03/31 0700 - 04/01 0659 04/01 0700 - 04/02 0659    P.O. 660 550     I.V. (mL/kg)  20 (0.3)     Other  0     .6 518.5     Total Intake(mL/kg) 769.6 (11) 1088.5 (17.1)     Urine (mL/kg/hr) 1850  (1.1) 1150 (0.8) 400 (2.9)    Emesis/NG output 0 (0) 0 (0)     Other  0 (0)     Stool 0 (0) 0 (0)     Blood  0 (0)     Total Output 1850 1150 400    Net -1080.4 -61.5 -400           Urine Occurrence  0 x     Stool Occurrence 1 x 0 x     Emesis Occurrence 1 x 0 x           Physical Exam   Constitutional: He is oriented to person, place, and time. He appears well-developed.   Hand and temporal muscle wasting   HENT:   Head: Normocephalic.   Eyes: Pupils are equal, round, and reactive to light. No scleral icterus.   Cardiovascular: Normal rate, regular rhythm, normal heart sounds and intact distal pulses.    Pulmonary/Chest: Effort normal and breath sounds normal. No respiratory distress. He has no wheezes. He has no rhonchi. He has no rales.   Abdominal: Soft. Bowel sounds are normal. He exhibits distension. There is no tenderness.   Musculoskeletal: Normal range of motion. He exhibits edema (generalized).   Neurological: He is alert and oriented to person, place, and time.   Skin: Skin is warm and dry.   Psychiatric: He has a normal mood and affect. His behavior is normal. Judgment and thought content normal.   Nursing note and vitals reviewed.      Laboratory:  Immunosuppressants         Stop Route Frequency     mycophenolate capsule 500 mg      -- Oral 2 times daily     cycloSPORINE modified capsule 125 mg      -- Oral 2 times daily     cycloSPORINE modified (NEORAL) 25 MG capsule      03/09 1714       cycloSPORINE modified (NEORAL) 100 MG capsule      03/09 1714       cycloSPORINE modified (NEORAL) 100 MG capsule      03/09 0514       cycloSPORINE modified (NEORAL) 25 MG capsule      03/09 0514          CBC:     Recent Labs  Lab 04/01/18  0545   WBC 3.05*   RBC 2.45*   HGB 8.2*   HCT 25.2*   PLT 83*   *   MCH 33.5*   MCHC 32.5     CMP:     Recent Labs  Lab 04/01/18  0545   GLU 97   CALCIUM 9.6   ALBUMIN 2.9*   PROT 6.1      K 3.8   CO2 25      BUN 75*   CREATININE 3.5*   ALKPHOS 114   ALT 8*    AST 19   BILITOT 1.0     Labs within the past 24 hours have been reviewed.    Diagnostic Results:  I have personally reviewed all pertinent imaging studies.    Assessment/Plan:     Liver transplanted    - Post op course complicated by fevers and hyperbilirubinemia, AST/ALT stable.  - see biliary stricture of transplanted liver  - Liver biopsy 1/23 without rejection.  - Paracentesis 3/6 negative for infection, wbc 28 segs 3%.  - ERCP, EUS biopsy 3/9/18 - no rejection, mild cholestatic hepatitis seen.   - T bili normalized        Long-term use of immunosuppressant medication    - Maintenance IS with cyclosporine --> transitioned to Prograf 2/24 for continued nausea but did not tolerate from neuro standpoint.  PT IS NOT A CANDIDATE FOR TACROLIMUS.    - Transitioned back to cyclosporine 3/1  - D/c cyclosporine, started rapa & stress dose steroids 3/10 for continued confusion  - Mental status now improved, re-trial cyclosporine 3/13, d/c rapa 3/13  - Transition back to prednisone taper from stress dose steroids 3/15 --> will decrease by 5 mg q week (decrease to 5 mg 4/6).  - Restarted low dose MMF 3/23- with nausea --> D/C Cellcept 3/31 --> nausea improved --> continue to hold as has passed protocol for Cellcept        Prophylactic immunotherapy    - See long term use of immunosuppression.         At risk for opportunistic infections    - see other cytomegaloviral disease.  - Pentam given 2/24. Plan to give next dose 3/27  - D/C Isavu 3/21.        Vitamin D deficiency    - Continue ergo 50K weekly.         Other cytomegaloviral diseases    - detected at 370 on CMV PCR 2/1.  - case discussed with ID and will hold off on treatment at this time given low WBC.   - undetected CMV PCR 2/8, 2/15, 2/22, 3/8   - continue Valcyte 200 mg every Mon, Wed, Fri.  - Per ID, valcyte dose decreased to ppx dose on 2/18--Valcyte d/c'd 2/25  - Repeat scope 3/6 to assess persistent N/V and possible CMV causing these symptoms. Biopsies thus  far normal, cmv stains negative.  - continue to monitor weekly CMV PCR's, last PCR neg from 3/15, recent level from 3/22 undetected.  - CMV 3/29 undetected.        Constipation    - chronic  - encourage ambulation, stool softeners and laxatives, often refuses some meds 2/2 nausea, will often require suppository or brown bomb enema to have a BM.  - Small bowel follow through with normal findings.        Severe protein-calorie malnutrition    - Poor PO intake since transplant requiring short course of TPN during previous hospital stay.   - Due to N/V and abdominal pain, multiple attempts were made to place SHANE tube with tube feeding, but patient did not tolerate TF  - Transitioned to TPN 1/16 then again on 2/24 due to poor tolerance of SAHNE tube/TF  - prealbumin 7 on 1/15 --> prealbumin 22 on 3/19, monitoring weekly (Mondays)  - EGD performed 1/25 and 3/6, gastroparesis, duodenal ulcer, and congested/erythematous gastric mucosa  - Small bowel through with normal findings.  - Consulted GI for GJ tube placement, but was canceled due to ongoing fevers  - Hold Reglan and Marinol 3/8 for neuro symptoms --> restarted reglan and marinol 3/19, will need to dc marinol before discharge because not covered by insurance.  - set goals for caloric intake with patient, continue nocturnal TPN started 3/20 PM to encourage appetite during the day  - Trying to set small caloric goals daily, for now goal is 300-500 jalen/day. Ultimately needs to consume 700 jalen/day to start weaning TPN.   - Ween TPN to half 3/30 --> back up to full 3/31 as not eating -> decrease again since ate 3/31        Nausea and vomiting    - h/o constipation, abdominal pain, and N/V  - worsened with SHANE tube and TF --> now on TPN  - encourage bowel regimen  - EGD performed 1/25 and 3/6, gastroparesis, duodenal ulcer, and congested/erythematous gastric mucosa  - Small bowel through with normal findings  - Continue reglan and marinol scheduled, anti-emetics PRN.   - If  cont with GI symptoms then may need to consider Flex sig/ C-scope to further eval.   - Abd xray with distended colon - did not want enema  - D/C Cellcept 3/31  - Improved today        Biliary stricture of transplanted liver    - ERCP 12/6 with post-anastomosis stricture with stent placement.  - Tbili with increase prompting ERCP 1/30 with stones and sludge, stents placed.  - Repeat ERCP 2/19 as was having recurrent fevers with bilirubin stuck at 2.0-2.2 with overall unremarkable findings seen. Did place new larger stents.   - ERCP/EUS 3/9 with metal stent placed in bile duct, stones removed.  - Tbili remains stable.   - Continue actigall.        Anemia of chronic disease    - 1 U PRBC 3/12.  - Monitor daily labs. H/H stable, transfuse PRN.         Delayed surgical wound healing    - Wd vac removed 1/12/18.    - Wound healing well.   - Changing dressing q Tuesday, last 3/27.        Seizure    - 3/1, patient with stuttering speech, trouble recalling specific words when speaking. Does have hx of seizure (on keppra) previously while on prograf. Switched to prograf over the weekend from cyclosporine. Neuro exam unremarkable.   - CT head without contrast obtained, unremarkable.   - In light of these symptoms, prograf d/c, restart cyclosporine 3/1.  - remains w/o seizure. Cont to have stutter/garbled speech/difficulty finding words.  Worse post EGD as sedation was given.  - MRI 3/6 unremarkable.  - EEG completed 3/8 - mild, generalized, non-specific cerebral dysfunction, no epileptiform activity.  - monitor closely. VSS.  - Should d/w neuro this week decreasing Keppra        Acute renal failure with tubular necrosis    - HD resumed on previous admission. Anuric on admission and dialyzes M-W-F.  - Nephrology following.    - Goal is for kidney txp in future- currently not a candidate due to health status.  - urine output has improved since 3/16 --> 2.6 L 3/27!  - HD held 3/21 & 3/23  - Nephrology started torsemide 50 mg  daily 3/24- continue for now for volume management  - BUN elevated though on TPN, HD 3/29/18   - Will plan HD twice weekly for now  - Hold torsemide for now (d/roselia on 3/31)            VTE Risk Mitigation         Ordered     heparin (porcine) injection 1,000 Units  As needed (PRN)     Route:  Intra-Catheter        03/12/18 1134     heparin (porcine) injection 5,000 Units  Every 8 hours     Route:  Subcutaneous        03/08/18 1622     Medium Risk of VTE  Once      01/11/18 0351     Place sequential compression device  Until discontinued      01/11/18 0351          The patients clinical status was discussed at multidisplinary rounds, involving transplant surgery, transplant medicine, pharmacy, nursing, nutrition, and social work    Discharge Planning:  Monitor kidney function --> may need outpt HD  Monitor HH needs vs rehab for deconditioned status      Geronimo Vera PA-C  Liver Transplant  [facility]

## 2020-02-05 NOTE — PROGRESS NOTES
Adjusted Lantus insulin increased a.m. dose and slight increase p.m. dose. Will follow sugar management and assist as able.

## 2020-02-05 NOTE — PROGRESS NOTES
Problem: Self Care Deficits Care Plan (Adult) Goal: *Acute Goals and Plan of Care (Insert Text) Description 1. Patient will complete upper body bathing/dressing with SBA to improve participation with ADL tasks. 2. Patient will tolerate sitting edge of bed for 8 minutes to improve sitting tolerance for ADL. 3. Patient will complete rolling side to side in bed with supervision for positioning for ADLs. 4. Patient will attempt to stand within 3 visits to prepare for functional transfers. MET 5. Patient will complete grooming tasks with set-up to improve independence with ADL. 6. Patient will tolerate 30 minutes of OT activity with 2-3 rest breaks to improve activity tolerance. 7. Patient will complete functional transfers with minimal assistance to the chair/BSC. 8. Patient will complete functional mobility for household distances with minimal assistance. Timeframe: 7 visits Outcome: Progressing Towards Goal 
  
OCCUPATIONAL THERAPY: Daily Note and AM 2/5/2020 INPATIENT: OT Visit Days: 2 Payor: Princess Chou / Plan: Via [employer] / Product Type: [employer] Care Medicare /  
  
NAME/AGE/GENDER: Rita Ambrosio is a 69 y.o. male PRIMARY DIAGNOSIS:  Atherosclerosis of native coronary artery of native heart with unstable angina pectoris ([facility] [address] [I25.110] Nonrheumatic aortic valve stenosis [I35.0] CAD (coronary artery disease) [I25.10] Aortic valve stenosis [I35.0] S/P CABG x 3 S/P CABG x 3 Procedure(s) (LRB): ULTRASOUND on THinners (Bilateral) 2 Days Post-Op ICD-10: Treatment Diagnosis:  
 · Generalized Muscle Weakness (M62.81) · Other lack of cordination (R27.8) Precautions/Allergies: 
   Heparin; Amoxicillin; Keflex [cephalexin]; and Pcn [penicillins] ASSESSMENT:  
 
Mr. Sofia Tenorio presents s/p CABG x 3. Pt has ischemic digits on the hands and feet and wound vac to his L thigh.  Pt lives with his wife and states that typically he is modified independent with ADL and uses a cane for functional mobility. Pt denies any falls. Pt reports his wife has difficulty walking and he was helping her to get around the house. 2/5/20: Pt was sitting up in the recliner upon arrival. Nurse reports he has been able to stand for transfers. Pt is still demanding to use the lift and pt needed education on the benefit of moving more for increased strengthening and improving activity tolerance. Pt is demanding at times and has multiple excuses for why he cannot participate in an activity. However, with encouragement pt did participate. Pt completed transfer to [facility] and multiple sit to stands and standing tolerance for hygiene and applying cream to his bottom. Pt needed both UEs to hold to the walker and did not attempt to assist with his bowel hygiene. Pt was able to take a few steps back to the bed and sat early with pt sitting on the edge of the bed. Pt educated on safety with functional transfers. Pt stood additionally to reposition himself back on the bed and completed sit to supine with CGA and was able to bring B LEs back onto the bed. Pt did assist to lift LEs to place pillow under. Pt encouraged to rotate off his back due to tender bottom but pt declined. Nurse at bedside at the end of the session. Pt did demonstrate progress with standing. New goals added to reflect progress. Pt to continue per plan of care. This section established at most recent assessment PROBLEM LIST (Impairments causing functional limitations): 1. Decreased Strength 2. Decreased ADL/Functional Activities 3. Decreased Transfer Abilities 4. Decreased Ambulation Ability/Technique 5. Decreased Balance 6. Increased Pain 7. Decreased Activity Tolerance 8. Decreased Pacing Skills 9. Increased Fatigue 10. Increased Shortness of Breath 11. Decreased Flexibility/Joint Mobility 12. Decreased Knowledge of Precautions 13. Decreased Skin Integrity/Hygeine 14. Decreased [place] with Home Exercise Program 
15. Decreased Cognition INTERVENTIONS PLANNED: (Benefits and precautions of occupational therapy have been discussed with the patient.) 1. Activities of daily living training 2. Adaptive equipment training 3. Balance training 4. Clothing management 5. Cognitive training 6. Donning&doffing training 7. Neuromuscular re-eduation 8. Therapeutic activity 9. Therapeutic exercise TREATMENT PLAN: Frequency/Duration: Follow patient 3 times per week to address above goals. Rehabilitation Potential For Stated Goals: Good REHAB RECOMMENDATIONS (at time of discharge pending progress):   
Placement: It is my opinion, based on this patient's performance to date, that Mr. Jc Diaz may benefit from intensive therapy at a [address] [place] [address] after discharge due to the functional deficits listed above that are likely to improve with skilled rehabilitation and concerns that he/she may be unsafe to be unsupervised at home due to risk of falls and further functional decline. Equipment: ? TBD   
    
 
 
 
OCCUPATIONAL PROFILE AND HISTORY:  
History of Present Injury/Illness (Reason for Referral): 
See H&P Past Medical History/Comorbidities:  
Mr. Jc Diaz  has a past medical history of Arthritis, BPH (benign prostatic hyperplasia) (1/14/2013), CAD (coronary artery disease), DM type 2 (diabetes mellitus, type 2) ([facility] [address] (dx 2004), Dyspnea, Gout (1/14/2013), HLD (hyperlipidemia) (1/14/2013), HTN (hypertension), Morbid obesity ([facility] [address] (9/3/14), Psychiatric disorder, Rheumatic fever, Seasonal allergic rhinitis, Severe aortic valve stenosis, Thyroid disease, and Unspecified sleep apnea (2016). Mr. Jc Diaz  has a past surgical history that includes hx tonsil and adenoidectomy; hx heart catheterization (12/23/2019); hx orthopaedic (Left); hx cataract removal (Bilateral, 2012); and ir insert tunl cvc w port over 5 years (2/4/2020). Social History/Living Environment:  
Home Environment: Private residence # Steps to Enter: 1 One/Two Story Residence: One story Living Alone: No 
Support Systems: Spouse/Significant Other/Partner Patient Expects to be Discharged to[de-identified] Private residence Current DME Used/Available at Home: Cane, straight, Shower chair Tub or Shower Type: Shower Prior Level of Function/Work/Activity: 
Pt lives with his wife and states that typically he is modified independent with ADL and uses a cane for functional mobility. Pt denies any falls. Pt reports his wife has difficulty walking and he was helping her to get around the house. Personal Factors:   
      Sex:  male Age:  69 y.o. Past/Current Experience:  s/p CABG x 3 Overall Behavior:  anxious; demanding Other factors that influence how disability is experienced by the patient:  multiple co-morbidities Number of Personal Factors/Comorbidities that affect the Plan of Care: Extensive review of physical, cognitive, and psychosocial performance (3+):  HIGH COMPLEXITY ASSESSMENT OF OCCUPATIONAL PERFORMANCE[de-identified]  
Activities of Daily Living:  
Basic ADLs (From Assessment) Complex ADLs (From Assessment) Feeding: Stand-by assistance Oral Facial Hygiene/Grooming: Minimum assistance Bathing: Maximum assistance Upper Body Dressing: Moderate assistance Lower Body Dressing: Total assistance Toileting: Maximum assistance, Total assistance Instrumental ADL Meal Preparation: Total assistance Homemaking: Total assistance Grooming/Bathing/Dressing Activities of Daily Living Cognitive Retraining Safety/Judgement: Fall prevention Toileting Toileting Assistance: Maximum assistance Bowel Hygiene: Maximum assistance Cues: Tactile cues provided;Verbal cues provided;Visual cues provided Adaptive Equipment: Elevated seat;Walker Functional Transfers Bathroom Mobility: Moderate assistance Toilet Transfer : Moderate assistance Bed/Mat Mobility Sit to Stand: Moderate assistance Stand to Sit: Moderate assistance Bed to Chair: Moderate assistance Most Recent Physical Functioning:  
Gross Assessment: 
AROM: Generally decreased, functional 
Strength: Generally decreased, functional 
Coordination: Generally decreased, functional 
Sensation: Impaired(decreased in the fingertips) Posture: 
Posture (WDL): Exceptions to [facility] Posture Assessment: Forward head Balance: 
Sitting: Intact Sitting - Static: Good (unsupported) Sitting - Dynamic: Fair (occasional) Standing: Impaired Standing - Static: Fair Standing - Dynamic : Poor Bed Mobility: 
  
Wheelchair Mobility: 
  
Transfers: 
Sit to Stand: Moderate assistance Stand to Sit: Moderate assistance Stand Pivot Transfers: Moderate assistance Bed to Chair: Moderate assistance Patient Vitals for the past 6 hrs: 
 BP SpO2 O2 Flow Rate (L/min) Pulse 20 0835  97 % 4 l/min   
20 1133 108/56   84  
20 1137  97 % 4 l/min   
20 1233    81  
20 1243 (!) 147/94   90 Mental Status Neurologic State: Alert Orientation Level: Oriented X4 Cognition: Follows commands Perception: Appears intact Perseveration: No perseveration noted Safety/Judgement: Fall prevention Physical Skills Involved: 1. Range of Motion 2. Balance 3. Strength 4. Activity Tolerance 5. Sensation 6. Fine Motor Control 7. Pain (acute) 8. Edema 9. Skin Integrity Cognitive Skills Affected (resulting in the inability to perform in a timely and safe manner): 1. Executive Function 2. Sustained Attention 3. Divided Attention Psychosocial Skills Affected: 1. Habits/Routines 2. Social Interaction 3. Emotional Regulation 4. Self-Awareness Number of elements that affect the Plan of Care: 5+:  HIGH COMPLEXITY CLINICAL DECISION MAKIN [facility] [address] [id number] AM-PAC 6 Clicks Daily Activity Inpatient Short Form How much help from another person does the patient currently need. .. Total A Lot A Little None 1. Putting on and taking off regular lower body clothing? [x] 1   [] 2   [] 3   [] 4  
2. Bathing (including washing, rinsing, drying)? [] 1   [x] 2   [] 3   [] 4  
3. Toileting, which includes using toilet, bedpan or urinal?   [x] 1   [] 2   [] 3   [] 4  
4. Putting on and taking off regular upper body clothing? [] 1   [x] 2   [] 3   [] 4  
5. Taking care of personal grooming such as brushing teeth? [] 1   [x] 2   [] 3   [] 4  
6. Eating meals? [] 1   [] 2   [x] 3   [] 4  
© 2007, Trustees of [address] [place] [address] [address] [id number], under license to [employer] All rights reserved Score:  Initial:  11 Most Recent: X (Date: -- ) Interpretation of Tool:  Represents activities that are increasingly more difficult (i.e. Bed mobility, Transfers, Gait). Medical Necessity:    
· Patient demonstrates · good and fair ·  rehab potential due to higher previous functional level. Reason for Services/Other Comments: 
· Patient continues to require skilled intervention due to · Decreased independence with ADL/functional transfers · . Use of outcome tool(s) and clinical judgement create a POC that gives a: MODERATE COMPLEXITY  
 
 
 
TREATMENT:  
(In addition to Assessment/Re-Assessment sessions the following treatments were rendered) Pre-treatment Symptoms/Complaints:   
Pain: Initial:  
Pain Intensity 1: 0  Post Session:  0/10 Today's treatment session addressed Decreased Strength, Decreased ADL/Functional Activities, Decreased Transfer Abilities, Decreased Ambulation Ability/Technique, Decreased Balance, Increased Pain, Decreased Activity Tolerance, Increased Shortness of Breath, Decreased Flexibility/Joint Mobility, Edema/Girth, Decreased Knowledge of Precautions, Decreased Skin Integrity/Hygeine, Decreased [place] with Home Exercise Program, and Decreased Cognition to progress towards achieving goal(s) stated above. During this session,  Physical Therapy addressed  Balance to progress towards their discipline specific goal(s). Co-treatment was necessary to improve patient's ability to follow higher level commands and ability to increase activity demands. Therapeutic Activity: (23 minutes): Therapeutic activities including Bed transfers, Chair transfers, Toilet transfers, Ambulation on level ground and standing tolerance for ADL to improve mobility, strength, balance and coordination. Required moderate assistance   to promote static and dynamic balance in standing.  
 
n/a Braces/Orthotics/Lines/Etc:  
· IV 
· O2 Device: Nasal cannula Treatment/Session Assessment:   
· Response to Treatment:  Pt tolerated with anxious behavior. · Interdisciplinary Collaboration:  
o Occupational Therapist 
o Registered Nurse · After treatment position/precautions:  
o Up in chair 
o Bed/Chair-wheels locked 
o Call light within reach 
o RN notified 
o Nurse at bedside · Compliance with Program/Exercises: Will assess as treatment progresses. · Recommendations/Intent for next treatment session: \"Next visit will focus on advancements to more challenging activities and reduction in assistance provided\". Total Treatment Duration: OT Patient Time In/Time Out Time In: 1100 Time Out: 1125 Leslie Farfan OT

## 2020-02-05 NOTE — PROGRESS NOTES
PT note:  Treatment deferred as the patient is off the floor at dialysis. Will continue PT efforts.   Jonas Ramsay, PTA

## 2020-02-05 NOTE — PROGRESS NOTES
TRANSFER IN - DIALYSIS Received patient in dialysis unit  from CVICU (unit) for ordered procedure. Consent verified for renal replacement therapy. Patient alert and oriented and vital signs stable. /94 P90 Hemodialysis initiated using tunnel catheter. Aspirated and flushed both ports without difficulty. Dressing clean, dry and intact. Machine settings per MD order. Will monitor during treatment.

## 2020-02-05 NOTE — PROGRESS NOTES
Warfarin dosing per pharmacist 
 
Ab Correa is a 69 y.o. male. Height: 5' 10\" (177.8 cm)    Weight: 120.1 kg (264 lb 12.8 oz) Indication:  AVR + Afib Goal INR:  2.5-3.5 Home dose:  New start Risk factors or significant drug interactions:  amiodarone Other anticoagulants:  Argatroban Daily Monitoring Date  INR     Warfarin dose HGB              Notes 2/4  1.4  2.5 mg  7.8   
2/5  2.3  2.5 mg  8.3 Pharmacy has been consulted to manage Mr. Anu Mcnally warfarin during this admission. Started on 2.5mg daily on 2/4 due to age, amiodarone interaction and concurrent argatroban. Recommend to continue current dosing. Pharmacy will follow daily INR and make adjustments as needed. Reminders: ? Argatroban falsely elevates the INR (typically INR is roughly double the true INR while on argatroban). ? If using argatroban to bridge to warfarin, the drip must overlap with warfarin therapy for at least 5 days, regardless of the INR value, to prevent recurrent thrombosis, skin necrosis or venous gangrene. ? When the INR while on argatroban and warfarin together reaches ? 4.0, stop the argatroban drip for 4-6 hours and check the true INR. ? If the true INR is > 2.0, the argatroban drip may be stopped and the patient can continue with just warfarin. (If have overlapped ? 5 days.) ? If the true INR is < 2.0, the argatroban drip should be resumed at the previous rate and will continue until true INR is > 2.0 Thank you, Gila Nagy, PharmD, BCCCP  [email or web address] [email or web address]

## 2020-02-05 NOTE — PROGRESS NOTES
A follow up visit was made to the patient. Emotional support, spiritual presence and  
prayer were provided for the patient. EDMOND Cardoza

## 2020-02-05 NOTE — PROGRESS NOTES
REVISED GOALS and ONGOING 2/1/2020 all goals  : 
 
STG: 
(1.)Patient will roll side to side in bed with  MODERATE ASSIST within 3-5 day(s). (2.)Patient will move from supine to sit and sit to supine  in bed with  MODERATE ASSIST within 3-5 day(s). (3.)Patient will sit edge of bed/chair with STAND BY ASSIST and good balance statically  for 10 minutes within 3-5 day(s). 4.  Patient will perform 1 sets of 10 repetitions of active range of motion exercises for bilateral lower extremity(s) with  cueing within 3-5 day(s). 5.  Patient will tolerate sitting with bed in chair position for 2 hour 2x/day to facilitate tolerance to sitting in chair within 3-5 days. LTG: 
(1.)Patient will move from supine to sit and sit to supine  and roll side to side in bed with  MIN ASSIST  within 7-10 day(s). (2.)Patient will transfer from bed to chair and chair to bed with  MODERATE ASSIST using the least restrictive device within 7-10 day(s). (3.)Patient will stand with  MINIMAL ASSIST x 2 for 2 minutes with the least restrictive device within 7-10 day(s). 4.  Patient will exhibit 3/5 strength B LE's to facilitate increased independence with bed mobility within 7-10 days. PHYSICAL THERAPY: Daily Note and AM 2/5/2020 INPATIENT: PT Visit Days : 5 Payor: Joey Onofre / Plan: Erika Young / Product Type: [employer] Care Medicare /   
  
NAME/AGE/GENDER: Jase Mckeon is a 69 y.o. male PRIMARY DIAGNOSIS: Atherosclerosis of native coronary artery of native heart with unstable angina pectoris ([address] [I25.110] Nonrheumatic aortic valve stenosis [I35.0] CAD (coronary artery disease) [I25.10] Aortic valve stenosis [I35.0] S/P CABG x 3 S/P CABG x 3 Procedure(s) (LRB): ULTRASOUND on THinners (Bilateral) 2 Days Post-Op ICD-10: Treatment Diagnosis:  
 · Other abnormalities of gait and mobility (R26.89) Precaution/Allergies: Heparin; Amoxicillin; Keflex [cephalexin]; and Pcn [penicillins] ASSESSMENT:  
 
Mr. Alaina Mcnally underwent above surgery on 1/10/20 and was only recently extubated. He lives with his wife and ambulates independently with cane or RW at baseline. 2/5/20- Pt is sitting in the recliner and is very very fussy. He wants to get his buttock cleaned and insist that we use the stand up lift. Co treat with OT this session. Sit to stand with min to moderate assist x 2 to the walker several times for short periods of time. Patient is able to take a few steps over to the [facility] then to the bed with the use of the walker. Patient is doing much better with his bed mobility. Attempted to get the patient to participate in therapeutic exercises however the patient not participate even after explaining the benefits of full participation in therapy. Progress made and will continue efforts. Needs rehab. This section established at most recent assessment PROBLEM LIST (Impairments causing functional limitations): 1. Decreased Strength 2. Decreased ADL/Functional Activities 3. Decreased Transfer Abilities 4. Decreased Ambulation Ability/Technique 5. Decreased Balance 6. Decreased Activity Tolerance 7. Increased Fatigue 8. Increased Shortness of Breath 9. Decreased Knowledge of Precautions INTERVENTIONS PLANNED: (Benefits and precautions of physical therapy have been discussed with the patient.) 1. Balance Exercise 2. Bed Mobility 3. Neuromuscular Re-education/Strengthening 4. Therapeutic Activites 5. Therapeutic Exercise/Strengthening 6. Transfer Training 7. education TREATMENT PLAN: Frequency/Duration: twice daily for duration of hospital stay Rehabilitation Potential For Stated Goals: Good REHAB RECOMMENDATIONS (at time of discharge pending progress):   
Placement:  
It is my opinion, based on this patient's performance to date, that  Alaina Mcnally may benefit from intensive therapy at a [address] [place] [address] after discharge due to the functional deficits listed above that are likely to improve with skilled rehabilitation and severe debility. Equipment:  
? tbd  
? None at this time HISTORY:  
History of Present Injury/Illness (Reason for Referral): 
Patient admitted for above surgery. Past Medical History/Comorbidities:  
Mr. Alaina Mcnally  has a past medical history of Arthritis, BPH (benign prostatic hyperplasia) (1/14/2013), CAD (coronary artery disease), DM type 2 (diabetes mellitus, type 2) ([facility] [address] (dx 2004), Dyspnea, Gout (1/14/2013), HLD (hyperlipidemia) (1/14/2013), HTN (hypertension), Morbid obesity ([facility] [address] (9/3/14), Psychiatric disorder, Rheumatic fever, Seasonal allergic rhinitis, Severe aortic valve stenosis, Thyroid disease, and Unspecified sleep apnea (2016). Mr. Alaina Mcnally  has a past surgical history that includes hx tonsil and adenoidectomy; hx heart catheterization (12/23/2019); hx orthopaedic (Left); hx cataract removal (Bilateral, 2012); and ir insert tunl cvc w port over 5 years (2/4/2020). Social History/Living Environment:  
Home Environment: Private residence # Steps to Enter: 1 One/Two Story Residence: One story Living Alone: No 
Support Systems: Spouse/Significant Other/Partner Patient Expects to be Discharged to[de-identified] Private residence Current DME Used/Available at Home: Cane, straight, Shower chair Tub or Shower Type: Shower Prior Level of Function/Work/Activity: 
 
He lives with his wife and ambulates independently with cane or RW at baseline. Number of Personal Factors/Comorbidities that affect the Plan of Care: 3+: HIGH COMPLEXITY EXAMINATION:  
Most Recent Physical Functioning:  
Gross Assessment: 
  
         
  
Posture: 
  
Balance: 
Sitting: Intact Sitting - Static: Good (unsupported) Sitting - Dynamic: Fair (occasional) Standing: Impaired Standing - Static: Fair Standing - Dynamic : Poor Bed Mobility: 
  
Wheelchair Mobility: 
  
Transfers: 
Sit to Stand: Moderate assistance Stand to Sit: Moderate assistance Stand Pivot Transfers: Moderate assistance Gait: 
  
   
  
Body Structures Involved: 1. Heart 2. Lungs 3. Muscles Body Functions Affected: 1. Cardio 2. Respiratory 3. Neuromusculoskeletal 
4. Movement Related Activities and Participation Affected: 1. Mobility 2. Self Care 3. Domestic Life 4. Community, Social and [place] Number of elements that affect the Plan of Care: 4+: HIGH COMPLEXITY CLINICAL PRESENTATION:  
Presentation: Evolving clinical presentation with changing clinical characteristics: MODERATE COMPLEXITY CLINICAL DECISION MAKING:  
[facility] AM-PAC 6 Clicks Basic Mobility Inpatient Short Form How much difficulty does the patient currently have. .. Unable A Lot A Little None 1. Turning over in bed (including adjusting bedclothes, sheets and blankets)? [x] 1   [] 2   [] 3   [] 4  
2. Sitting down on and standing up from a chair with arms ( e.g., wheelchair, bedside commode, etc.)   [x] 1   [] 2   [] 3   [] 4  
3. Moving from lying on back to sitting on the side of the bed? [x] 1   [] 2   [] 3   [] 4 How much help from another person does the patient currently need. .. Total A Lot A Little None 4. Moving to and from a bed to a chair (including a wheelchair)? [x] 1   [] 2   [] 3   [] 4  
5. Need to walk in hospital room? [x] 1   [] 2   [] 3   [] 4  
6. Climbing 3-5 steps with a railing? [x] 1   [] 2   [] 3   [] 4  
© 2007, Trustees of [facility], under license to [employer]. All rights reserved Score:  Initial: 6 Most Recent: X (Date: -- ) Interpretation of Tool:  Represents activities that are increasingly more difficult (i.e. Bed mobility, Transfers, Gait).  
 
Medical Necessity:    
· Patient is expected to demonstrate progress in  
 · strength, range of motion, balance, coordination, functional technique, and activity tolerance ·  to  
· decrease assistance required with all functional mobility · . Reason for Services/Other Comments: 
· Patient continues to require skilled intervention due to · medical complications and patient unable to attend/participate in therapy as expected · . Use of outcome tool(s) and clinical judgement create a POC that gives a: Questionable prediction of patient's progress: MODERATE COMPLEXITY  
  
 
 
 
TREATMENT:  
(In addition to Assessment/Re-Assessment sessions the following treatments were rendered) Pre-treatment Symptoms/Complaints: \"My butt hurts\" Pain: Initial: 0 FLACC Pain Intensity 1: 6 Pain Location 1: Buttocks  Post Session:  0/10 FLACC Therapeutic Activity: (     minutes): Therapeutic activities including Bed transfers and sit to stand and standing in standing frame to improve mobility, strength, balance and and standing tolerance. Required moderate assist with sit to stand and standing balance activities   to promote static balance in standing. Today's treatment session addressed standing balance activities  to progress towards achieving goal(s) 0. During this session, Occupational Therapy addressed Activity tolerance to progress towards their discipline specific goal(s). Co-treatment was necessary to improve patient's ability to increase activity demands. Neuromuscular Re-education: ( 45 minutes):  Exercise/activities per grid below to improve balance, coordination, posture and proprioception. Required moderate verbal and tactile cues to promote coordination of bilateral, lower extremity(s). DATE: 1/26/20 1/29/20 2/3/20 Straight leg raise Hip abduct/ adduct X5 AAB Heel slides  X3 AB  10 x 2 B Hip external/ internal rotation Ankle dorsiflexion/ plantarflexion X5 AB 25 10 x 2 B Sitting unsupported x5'       
LAQ  25      
 marching 25 Key:  A=active, AA=active assisted, P=passive, B=bilaterally, R=right, L=left Braces/Orthotics/Lines/Etc:  
· Wound vac Treatment/Session Assessment:   
· Response to Treatment:  Patient is very fussy and wants to do things his way  Would not participate in exercises · Interdisciplinary Collaboration:  
o Physical Therapy Assistant 
o Registered Nurse · After treatment position/precautions:  
o Supine in bed 
o Bed/Chair-wheels locked 
o Bed in low position 
o Nurse at bedside · Compliance with Program/Exercises: Compliant all of the time · Recommendations/Intent for next treatment session: \"Next visit will focus on advancements to more challenging activities and reduction in assistance provided\". Total Treatment Duration: PT Patient Time In/Time Out Time In: 0823 Time Out: 5975 Kailey Morrissey, PTA

## 2020-02-05 NOTE — PROGRESS NOTES
TRANSFER - IN REPORT: 
 
Verbal report received from OLYA Cheeam(name) on Yvon Melendrez  being received from CVICU(unit) for routine post - op Report consisted of patients Situation, Background, Assessment and  
Recommendations(SBAR). Information from the following report(s) SBAR, Kardex, Intake/Output, MAR, Recent Results, Med Rec Status and Cardiac Rhythm NSR w/ 1st degree AVB was reviewed with the receiving nurse. Opportunity for questions and clarification was provided. Assessment completed upon patients arrival to unit and care assumed. MS incision open to air and LLE incision with ABD pad. .   Recent pacemaker site clean, dry, and intact. Large sacral pressure wound. Bilateral upper and lower extremities with black necrotic areas on fingers and toes. Toes wrapped with xeroform gauze and radha wrap.

## 2020-02-06 NOTE — WOUND CARE
Wound VAC restarted to left thigh incisions per request of CV surgery team. He will go to rehab so no home VAC form needed per Arvin AGOSTO report.

## 2020-02-06 NOTE — PROGRESS NOTES
REVISED GOALS and ONGOING 2/1/2020 all goals  : 
 
STG: 
(1.)Patient will roll side to side in bed with  MODERATE ASSIST within 3-5 day(s). (2.)Patient will move from supine to sit and sit to supine  in bed with  MODERATE ASSIST within 3-5 day(s). (3.)Patient will sit edge of bed/chair with STAND BY ASSIST and good balance statically  for 10 minutes within 3-5 day(s). 4.  Patient will perform 1 sets of 10 repetitions of active range of motion exercises for bilateral lower extremity(s) with  cueing within 3-5 day(s). 5.  Patient will tolerate sitting with bed in chair position for 2 hour 2x/day to facilitate tolerance to sitting in chair within 3-5 days. LTG: 
(1.)Patient will move from supine to sit and sit to supine  and roll side to side in bed with  MIN ASSIST  within 7-10 day(s). (2.)Patient will transfer from bed to chair and chair to bed with  MODERATE ASSIST using the least restrictive device within 7-10 day(s). (3.)Patient will stand with  MINIMAL ASSIST x 2 for 2 minutes with the least restrictive device within 7-10 day(s). 4.  Patient will exhibit 3/5 strength B LE's to facilitate increased independence with bed mobility within 7-10 days. PHYSICAL THERAPY: Daily Note and AM 2/6/2020 INPATIENT: PT Visit Days : 6 Payor: Erica Mcfadden / Plan: [employer] / Product Type: [employer] Care Medicare /   
  
NAME/AGE/GENDER: Radha Hess is a 69 y.o. male PRIMARY DIAGNOSIS: Atherosclerosis of native coronary artery of native heart with unstable angina pectoris ([address] [I25.110] Nonrheumatic aortic valve stenosis [I35.0] CAD (coronary artery disease) [I25.10] Aortic valve stenosis [I35.0] S/P CABG x 3 S/P CABG x 3 Procedure(s) (LRB): ULTRASOUND on THinners (Bilateral) 3 Days Post-Op ICD-10: Treatment Diagnosis:  
 · Other abnormalities of gait and mobility (R26.89) Precaution/Allergies: Heparin; Amoxicillin; Keflex [cephalexin]; and Pcn [penicillins] ASSESSMENT:  
 
Mr. Gregory Flores underwent above surgery on 1/10/20 and was only recently extubated. He lives with his wife and ambulates independently with cane or RW at baseline. 2/6/20- Pt is sitting in the recliner and agreeable to therapy. Sit to stand with min to moderate assist tot he walker. Once standing the patient states that he needs to use the [facility] he was seated and the [facility] placed closed he stood again and transferred to the [facility], he sat for a couple of minutes then stood again and went back to the recliner. Patient stood several more times and was even able to take one step.with the assist of 3 people to assist with tubes and keeping the chair close. Patient returned to sitting after he started yelling 'I'm going to fall\"  The patient was not in danger of falling. Patient is left sitting in the recliner with alarm intact and needs within reach. Patient is doing much better with overall mobility. Attempted to get the patient to participate in therapeutic exercises however the patient not participate even after explaining the benefits of full participation in therapy. Progress made and will continue efforts. Needs rehab. Will continue PT efforts. This section established at most recent assessment PROBLEM LIST (Impairments causing functional limitations): 1. Decreased Strength 2. Decreased ADL/Functional Activities 3. Decreased Transfer Abilities 4. Decreased Ambulation Ability/Technique 5. Decreased Balance 6. Decreased Activity Tolerance 7. Increased Fatigue 8. Increased Shortness of Breath 9. Decreased Knowledge of Precautions INTERVENTIONS PLANNED: (Benefits and precautions of physical therapy have been discussed with the patient.) 1. Balance Exercise 2. Bed Mobility 3. Neuromuscular Re-education/Strengthening 4. Therapeutic Activites 5. Therapeutic Exercise/Strengthening 6. Transfer Training 7. education TREATMENT PLAN: Frequency/Duration: twice daily for duration of hospital stay Rehabilitation Potential For Stated Goals: Good REHAB RECOMMENDATIONS (at time of discharge pending progress):   
Placement: It is my opinion, based on this patient's performance to date, that Mr. Jose Manuel Roberson may benefit from intensive therapy at a [facility] after discharge due to the functional deficits listed above that are likely to improve with skilled rehabilitation and severe debility. Equipment:  
? tbd  
? None at this time HISTORY:  
History of Present Injury/Illness (Reason for Referral): 
Patient admitted for above surgery. Past Medical History/Comorbidities:  
Mr. Jose Manuel Roberson  has a past medical history of Arthritis, BPH (benign prostatic hyperplasia) (1/14/2013), CAD (coronary artery disease), DM type 2 (diabetes mellitus, type 2) ([facility] [address] (dx 2004), Dyspnea, Gout (1/14/2013), HLD (hyperlipidemia) (1/14/2013), HTN (hypertension), Morbid obesity ([facility] [address] (9/3/14), Psychiatric disorder, Rheumatic fever, Seasonal allergic rhinitis, Severe aortic valve stenosis, Thyroid disease, and Unspecified sleep apnea (2016). Mr. Jose Manuel Roberson  has a past surgical history that includes hx tonsil and adenoidectomy; hx heart catheterization (12/23/2019); hx orthopaedic (Left); hx cataract removal (Bilateral, 2012); and ir insert tunl cvc w port over 5 years (2/4/2020). Social History/Living Environment:  
Home Environment: Private residence # Steps to Enter: 1 One/Two Story Residence: One story Living Alone: No 
Support Systems: Spouse/Significant Other/Partner Patient Expects to be Discharged to[de-identified] Private residence Current DME Used/Available at Home: Cane, straight, Shower chair Tub or Shower Type: Shower Prior Level of Function/Work/Activity: 
 
He lives with his wife and ambulates independently with cane or RW at baseline.   
Number of Personal Factors/Comorbidities that affect the Plan of Care: 3+: HIGH COMPLEXITY EXAMINATION:  
Most Recent Physical Functioning:  
Gross Assessment: 
  
         
  
Posture: 
  
Balance: 
Sitting: Intact Sitting - Static: Good (unsupported) Sitting - Dynamic: Fair (occasional) Standing: Impaired Standing - Static: Fair Standing - Dynamic : Poor Bed Mobility: 
  
Wheelchair Mobility: 
  
Transfers: 
Sit to Stand: Minimum assistance; Moderate assistance Stand to Sit: Minimum assistance; Moderate assistance Gait: 
  
   
  
Body Structures Involved: 1. Heart 2. Lungs 3. Muscles Body Functions Affected: 1. Cardio 2. Respiratory 3. Neuromusculoskeletal 
4. Movement Related Activities and Participation Affected: 1. Mobility 2. Self Care 3. Domestic Life 4. Community, Social and [address] [place] [address] Number of elements that affect the Plan of Care: 4+: HIGH COMPLEXITY CLINICAL PRESENTATION:  
Presentation: Evolving clinical presentation with changing clinical characteristics: MODERATE COMPLEXITY CLINICAL DECISION MAKIN[address] [place] [id number] AM-PAC 6 Clicks Basic Mobility Inpatient Short Form How much difficulty does the patient currently have. .. Unable A Lot A Little None 1. Turning over in bed (including adjusting bedclothes, sheets and blankets)? [x] 1   [] 2   [] 3   [] 4  
2. Sitting down on and standing up from a chair with arms ( e.g., wheelchair, bedside commode, etc.)   [x] 1   [] 2   [] 3   [] 4  
3. Moving from lying on back to sitting on the side of the bed? [x] 1   [] 2   [] 3   [] 4 How much help from another person does the patient currently need. .. Total A Lot A Little None 4. Moving to and from a bed to a chair (including a wheelchair)? [x] 1   [] 2   [] 3   [] 4  
5. Need to walk in hospital room? [x] 1   [] 2   [] 3   [] 4  
6. Climbing 3-5 steps with a railing? [x] 1   [] 2   [] 3   [] 4  
© 2007, Trustees of [address] [place] [address] [address] [id number], under license to [employer]. All rights reserved Score:  Initial: 6 Most Recent: X (Date: -- ) Interpretation of Tool:  Represents activities that are increasingly more difficult (i.e. Bed mobility, Transfers, Gait). Medical Necessity:    
· Patient is expected to demonstrate progress in  
· strength, range of motion, balance, coordination, functional technique, and activity tolerance ·  to  
· decrease assistance required with all functional mobility · . Reason for Services/Other Comments: 
· Patient continues to require skilled intervention due to · medical complications and patient unable to attend/participate in therapy as expected · . Use of outcome tool(s) and clinical judgement create a POC that gives a: Questionable prediction of patient's progress: MODERATE COMPLEXITY  
  
 
 
 
TREATMENT:  
(In addition to Assessment/Re-Assessment sessions the following treatments were rendered) Pre-treatment Symptoms/Complaints: \"My butt hurts\" Pain: Initial: 0 FLACC Pain Intensity 1: 0 Pain Location 1: Buttocks  Post Session:  0/10 FLACC Therapeutic Activity: (     25 minutes): Therapeutic activities including Bed transfers and sit to stand and standing in standing frame to improve mobility, strength, balance and and standing tolerance. Required moderate assist with sit to stand and standing balance activities   to promote static balance in standing. Today's treatment session addressed standing balance activities  to progress towards achieving goal(s) 0. During this session, Occupational Therapy addressed Activity tolerance to progress towards their discipline specific goal(s). Co-treatment was necessary to improve patient's ability to increase activity demands. Neuromuscular Re-education: (  minutes):  Exercise/activities per grid below to improve balance, coordination, posture and proprioception. Required moderate verbal and tactile cues to promote coordination of bilateral, lower extremity(s). DATE: 1/26/20 1/29/20 2/3/20 Straight leg raise Hip abduct/ adduct X5 AAB Heel slides  X3 AB  10 x 2 B Hip external/ internal rotation Ankle dorsiflexion/ plantarflexion X5 AB 25 10 x 2 B Sitting unsupported x5'       
LAQ  25 marching 25 Key:  A=active, AA=active assisted, P=passive, B=bilaterally, R=right, L=left Braces/Orthotics/Lines/Etc:  
· Wound vac · O2 Device: Heated, Hi flow nasal cannula 4L Treatment/Session Assessment:   
· Response to Treatment: see above · Interdisciplinary Collaboration:  
o Physical Therapy Assistant 
o Registered Nurse · After treatment position/precautions:  
o Supine in bed 
o Bed alarm/tab alert on 
o Bed/Chair-wheels locked 
o Call light within reach 
o Nurse at bedside · Compliance with Program/Exercises: Compliant all of the time · Recommendations/Intent for next treatment session: \"Next visit will focus on advancements to more challenging activities and reduction in assistance provided\". Total Treatment Duration: PT Patient Time In/Time Out Time In: 0900 Time Out: 3144 Deon Fontaine PTA

## 2020-02-06 NOTE — WOUND CARE
Patient seen for follow up to left thigh incision. VAC pulled off yesterday and ABD has been in place since. Noted present dressing with serous drainage. Discussed with patient and staff.  Monitoring amount of drainage and may restart VAC if moderate to heavy drainage continues or per CV surgical team.

## 2020-02-06 NOTE — PROGRESS NOTES
Today's Date: 2/6/2020 Date of Admission: 1/10/2020 Chart Reviewed. Subjective:  
 
Pt states he is \"depressed. \" He denies any dyspnea. Appetite better. Medications Reviewed. Objective:  
 
Vitals:  
 02/05/20 6614 02/05/20 2232 02/06/20 0234 02/06/20 6518 BP: 111/46 174/76 123/58 97/52 Pulse: 76 82 77 74 Resp: 18 16 16 16 Temp: 97 °F (36.1 °C) 97.6 °F (36.4 °C) 98.2 °F (36.8 °C) 98.6 °F (37 °C) SpO2: 92% 94% 94% 92% Weight:   262 lb 8 oz (119.1 kg) Height:      
 
 
Intake and Output Current Shift: No intake/output data recorded. Last 3 Shifts: 02/04 1901 - 02/06 0700 In: 773.9 [P.O.:610; I.V.:163.9] Out: 25 [Urine:25] Physical Exam: 
General: Well Developed, Well Nourished, No Acute Distress, Alert & Oriented x 3, Appropriate mood Neck: supple, no JVD Heart: S1S2 with RRR without murmurs or gallops Lungs: Clear throughout auscultation bilaterally without adventitious sounds Abd: soft, nontender, nondistended, with good bowel sounds Ext:  + edema bilaterally, still has oozing from left upper leg, toes bandaged Sternal incision: clean, dry, and intact Skin: warm and dry LABS Data Review:  
Recent Labs 02/06/20 
7916 02/05/20 
1304  136  
K 4.0 4.6 MG 2.3 2.4 BUN 35* 61* CREA 5.06* 6.92* * 174* WBC 14.8* 16.6* HGB 8.3* 8.3* HCT 26.9* 26.8*  
 169 INR 2.5 2.3 Estimated Creatinine Clearance: 16.8 mL/min (A) (based on SCr of 5.06 mg/dL (H)). Assessment/Plan:  
 
Principal Problem: S/P CABG x 3 (1/10/2020) [facility], will put wound vac back on left leg, on HD, platelets improving, on Argatroban and coumadin, will continue to overlap 2 more days and continue argatroban until INR >4 then check true INR Active Problems: Aortic valve stenosis (1/10/2020) S/p AVR 
  
  CAD (coronary artery disease) (1/10/2020) S/p CABG 
  
  Weaning from respirator [facility]) (1/10/2020)   
  
 Acute hypoxemic respiratory failure ([address] (1/10/2020) On O2 by NC 
  
  S/P AVR (1/10/2020) 
   
  Acute renal failure (ARF) ([address] (1/11/2020) On HD, nephrology following 
  
  Atrial fibrillation ([address] (1/13/2020) Intermittent, continue oral amiodarone, no BB with low BP at times 
  
  Shock ([address] (1/15/2020) 
resolved 
  
  Bilateral pneumothorax (1/17/2020) 
resolved 
  Thrombocytopenia ([address] (1/17/2020) Due to HIT, improving  
  
  HIT (heparin-induced thrombocytopenia) ([address] (1/23/2020) On argatroban/coumadin Pleural effusion (2/3/2020) Depression Pt has been stating for several days that he is depressed, on Buspar for anxiety, will defer to hospitalist as far as adding medication to Buspar or changing to medication to treat both anxiety and depression Rayo Diaz PA-C

## 2020-02-06 NOTE — PROGRESS NOTES
REVISED GOALS and ONGOING 2/1/2020 all goals  : 
 
STG: 
(1.)Patient will roll side to side in bed with  MODERATE ASSIST within 3-5 day(s). (2.)Patient will move from supine to sit and sit to supine  in bed with  MODERATE ASSIST within 3-5 day(s). (3.)Patient will sit edge of bed/chair with STAND BY ASSIST and good balance statically  for 10 minutes within 3-5 day(s). 4.  Patient will perform 1 sets of 10 repetitions of active range of motion exercises for bilateral lower extremity(s) with  cueing within 3-5 day(s). 5.  Patient will tolerate sitting with bed in chair position for 2 hour 2x/day to facilitate tolerance to sitting in chair within 3-5 days. LTG: 
(1.)Patient will move from supine to sit and sit to supine  and roll side to side in bed with  MIN ASSIST  within 7-10 day(s). (2.)Patient will transfer from bed to chair and chair to bed with  MODERATE ASSIST using the least restrictive device within 7-10 day(s). (3.)Patient will stand with  MINIMAL ASSIST x 2 for 2 minutes with the least restrictive device within 7-10 day(s). 4.  Patient will exhibit 3/5 strength B LE's to facilitate increased independence with bed mobility within 7-10 days. PHYSICAL THERAPY: Daily Note and PM 2/6/2020 INPATIENT: PT Visit Days : 6 Payor: Brandy Workman / Plan: Noah Gomes / Product Type: [employer] /   
  
NAME/AGE/GENDER: Rosaura Macario is a 69 y.o. male PRIMARY DIAGNOSIS: Atherosclerosis of native coronary artery of native heart with unstable angina pectoris ([facility] [I25.110] Nonrheumatic aortic valve stenosis [I35.0] CAD (coronary artery disease) [I25.10] Aortic valve stenosis [I35.0] S/P CABG x 3 S/P CABG x 3 Procedure(s) (LRB): ULTRASOUND on THinners (Bilateral) 3 Days Post-Op ICD-10: Treatment Diagnosis:  
 · Other abnormalities of gait and mobility (R26.89) Precaution/Allergies: Heparin; Amoxicillin; Keflex [cephalexin]; and Pcn [penicillins] ASSESSMENT:  
 
Mr. Ashley Morgan underwent above surgery on 1/10/20 and was only recently extubated. He lives with his wife and ambulates independently with cane or RW at baseline. 2/6/20- Pt is sitting in the recliner and agreeable to therapy. Sit to stand with min to moderate assist tot he walker. Once standing the patient states that he needs to use the [facility] he was seated and the [facility] placed closed he stood again and transferred to the [facility], he sat for a couple of minutes then stood again and went back to the recliner. Patient stood several more times and was even able to take one step.with the assist of 3 people to assist with tubes and keeping the chair close. Patient returned to sitting after he started yelling 'I'm going to fall\"  The patient was not in danger of falling. Patient is left sitting in the recliner with alarm intact and needs within reach. Patient is doing much better with overall mobility. Attempted to get the patient to participate in therapeutic exercises however the patient not participate even after explaining the benefits of full participation in therapy. Progress made and will continue efforts. Needs rehab. Will continue PT efforts. PM note:  Patient is transferred from the edge of the bed to the recliner with min assist.  Patient stood with min assist to the walker and stood for several minutes to allow the RN to perform ADL care multiple times. Patient is positioned for comfort and after a brief rest break the patient participated in therapeutic exercises. Tolerated well. Patient is doing much better with his mobility. Making progress. Needs rehab. Will continue PT efforts. This section established at most recent assessment PROBLEM LIST (Impairments causing functional limitations): 1. Decreased Strength 2. Decreased ADL/Functional Activities 3. Decreased Transfer Abilities 4. Decreased Ambulation Ability/Technique 5. Decreased Balance 6. Decreased Activity Tolerance 7. Increased Fatigue 8. Increased Shortness of Breath 9. Decreased Knowledge of Precautions INTERVENTIONS PLANNED: (Benefits and precautions of physical therapy have been discussed with the patient.) 1. Balance Exercise 2. Bed Mobility 3. Neuromuscular Re-education/Strengthening 4. Therapeutic Activites 5. Therapeutic Exercise/Strengthening 6. Transfer Training 7. education TREATMENT PLAN: Frequency/Duration: twice daily for duration of hospital stay Rehabilitation Potential For Stated Goals: Good REHAB RECOMMENDATIONS (at time of discharge pending progress):   
Placement: It is my opinion, based on this patient's performance to date, that Mr. Jose Manuel Poe may benefit from intensive therapy at a [address] [place] [address] after discharge due to the functional deficits listed above that are likely to improve with skilled rehabilitation and severe debility. Equipment:  
? tbd  
? None at this time HISTORY:  
History of Present Injury/Illness (Reason for Referral): 
Patient admitted for above surgery. Past Medical History/Comorbidities:  
Mr. Jose Manuel Poe  has a past medical history of Arthritis, BPH (benign prostatic hyperplasia) (1/14/2013), CAD (coronary artery disease), DM type 2 (diabetes mellitus, type 2) ([facility] [address] (dx 2004), Dyspnea, Gout (1/14/2013), HLD (hyperlipidemia) (1/14/2013), HTN (hypertension), Morbid obesity ([facility] [address] [facility] (9/3/14), Psychiatric disorder, Rheumatic fever, Seasonal allergic rhinitis, Severe aortic valve stenosis, Thyroid disease, and Unspecified sleep apnea (2016). Mr. Jose Manuel Poe  has a past surgical history that includes hx tonsil and adenoidectomy; hx heart catheterization (12/23/2019); hx orthopaedic (Left); hx cataract removal (Bilateral, 2012); and ir insert tunl cvc w port over 5 years (2/4/2020). Social History/Living Environment:  
Home Environment: Private residence # Steps to Enter: 1 One/Two Story Residence: One story Living Alone: No 
Support Systems: Spouse/Significant Other/Partner Patient Expects to be Discharged to[de-identified] Private residence Current DME Used/Available at Home: Cane, straight, Shower chair Tub or Shower Type: Shower Prior Level of Function/Work/Activity: 
 
He lives with his wife and ambulates independently with cane or RW at baseline. Number of Personal Factors/Comorbidities that affect the Plan of Care: 3+: HIGH COMPLEXITY EXAMINATION:  
Most Recent Physical Functioning:  
Gross Assessment: 
  
         
  
Posture: 
  
Balance: 
Sitting: Intact Sitting - Static: Good (unsupported) Sitting - Dynamic: Fair (occasional) Standing: Impaired Standing - Static: Fair Standing - Dynamic : Poor Bed Mobility: 
  
Wheelchair Mobility: 
  
Transfers: 
Sit to Stand: Minimum assistance; Moderate assistance Stand to Sit: Minimum assistance; Moderate assistance Gait: 
  
   
  
Body Structures Involved: 1. Heart 2. Lungs 3. Muscles Body Functions Affected: 1. Cardio 2. Respiratory 3. Neuromusculoskeletal 
4. Movement Related Activities and Participation Affected: 1. Mobility 2. Self Care 3. Domestic Life 4. Community, Social and [place] Number of elements that affect the Plan of Care: 4+: HIGH COMPLEXITY CLINICAL PRESENTATION:  
Presentation: Evolving clinical presentation with changing clinical characteristics: MODERATE COMPLEXITY CLINICAL DECISION MAKIN [facility] [address] [address] AM-PAC 6 Clicks Basic Mobility Inpatient Short Form How much difficulty does the patient currently have. .. Unable A Lot A Little None 1. Turning over in bed (including adjusting bedclothes, sheets and blankets)? [x] 1   [] 2   [] 3   [] 4  
2. Sitting down on and standing up from a chair with arms ( e.g., wheelchair, bedside commode, etc.)   [x] 1   [] 2   [] 3   [] 4  
3. Moving from lying on back to sitting on the side of the bed?    [x] 1 [] 2   [] 3   [] 4 How much help from another person does the patient currently need. .. Total A Lot A Little None 4. Moving to and from a bed to a chair (including a wheelchair)? [x] 1   [] 2   [] 3   [] 4  
5. Need to walk in hospital room? [x] 1   [] 2   [] 3   [] 4  
6. Climbing 3-5 steps with a railing? [x] 1   [] 2   [] 3   [] 4  
© 2007, Trustees of [address] [place] [address] [address] [address], under license to [employer]. All rights reserved Score:  Initial: 6 Most Recent: X (Date: -- ) Interpretation of Tool:  Represents activities that are increasingly more difficult (i.e. Bed mobility, Transfers, Gait). Medical Necessity:    
· Patient is expected to demonstrate progress in  
· strength, range of motion, balance, coordination, functional technique, and activity tolerance ·  to  
· decrease assistance required with all functional mobility · . Reason for Services/Other Comments: 
· Patient continues to require skilled intervention due to · medical complications and patient unable to attend/participate in therapy as expected · . Use of outcome tool(s) and clinical judgement create a POC that gives a: Questionable prediction of patient's progress: MODERATE COMPLEXITY  
  
 
 
 
TREATMENT:  
(In addition to Assessment/Re-Assessment sessions the following treatments were rendered) Pre-treatment Symptoms/Complaints: \"What are we going to do now\" Pain: Initial: 0 FLACC Pain Intensity 1: 0 Pain Location 1: Buttocks  Post Session:  0/10 FLACC Therapeutic Activity: (     14 minutes): Therapeutic activities including Bed transfers and sit to stand and standing in standing frame to improve mobility, strength, balance and and standing tolerance. Required moderate assist with sit to stand and standing balance activities   to promote static balance in standing. Today's treatment session addressed standing balance activities  to progress towards achieving goal(s) 0.  During this session, Occupational Therapy addressed Activity tolerance to progress towards their discipline specific goal(s). Co-treatment was necessary to improve patient's ability to increase activity demands. Neuromuscular Re-education: (  minutes):  Exercise/activities per grid below to improve balance, coordination, posture and proprioception. Required moderate verbal and tactile cues to promote coordination of bilateral, lower extremity(s). Therapeutic Exercise: ( 10 minutes):  Exercises per grid below to improve mobility, strength, balance and coordination. Required minimum  verbal cues to perform exercises correctly. Progressed repetitions and complexity of movement as indicated. DATE: 1/26/20 1/29/20 2/3/20 2/6/20 Straight leg raise Hip abduct/ adduct X5 AAB Heel slides  X3 AB  10 x 2 B Hip external/ internal rotation Ankle dorsiflexion/ plantarflexion X5 AB 25 10 x 2 B 25 Sitting unsupported x5'       
LAQ  25 25 marching 25 25 Key:  A=active, AA=active assisted, P=passive, B=bilaterally, R=right, L=left Braces/Orthotics/Lines/Etc:  
· Wound vac · O2 Device: Heated, Hi flow nasal cannula 4L Treatment/Session Assessment:   
· Response to Treatment: see above · Interdisciplinary Collaboration:  
o Physical Therapy Assistant 
o Registered Nurse · After treatment position/precautions:  
o Supine in bed 
o Bed alarm/tab alert on 
o Bed/Chair-wheels locked 
o Call light within reach 
o Nurse at bedside · Compliance with Program/Exercises: Compliant all of the time · Recommendations/Intent for next treatment session: \"Next visit will focus on advancements to more challenging activities and reduction in assistance provided\". Total Treatment Duration: PT Patient Time In/Time Out Time In: 2742 Time Out: 0558 Yuridia Gum, PTA

## 2020-02-06 NOTE — PROGRESS NOTES
This CM attempted to meet with pt at bedside to discuss discharge planning this day - pt was not easily aroused and very sleepy. This CM called and left VM with pt's spouse to discuss discharge planning. This CM was able to speak with pt's son who reports pt's spouse will not be able to physically assist pt that much if he were to return home. This CM informed him of the recommendation that pt would benefit from STR at a SNF when he is stable for discharge. Pt's son agreed and reports that he will relay to his mother and ask her to call this CM. This CM did leave SNF list in pt's room at son's request.  Pt's spouse will be visiting pt around 6pm this evening. No additional CM needs at this time. Will continue to follow.

## 2020-02-06 NOTE — PROGRESS NOTES
[facility] Progress Note Follow-Up on: DEJUAN/CKD ROS: 
Gen - no fever, no chills, appetite unchanged CV - no chest pain, no palpitation Lung - + shortness of breath, no cough Abd - no tenderness, no nausea/vomiting, no diarrhea Ext - + edema Exam: 
Vitals:  
 02/05/20 9176 02/05/20 2232 02/06/20 0234 02/06/20 2805 BP: 111/46 174/76 123/58 97/52 Pulse: 76 82 77 74 Resp: 18 16 16 16 Temp: 97 °F (36.1 °C) 97.6 °F (36.4 °C) 98.2 °F (36.8 °C) 98.6 °F (37 °C) SpO2: 92% 94% 94% 92% Weight:   119.1 kg (262 lb 8 oz) Height:      
 
 
 
Intake/Output Summary (Last 24 hours) at 2/6/2020 1122 Last data filed at 2/6/2020 3323 Gross per 24 hour Intake 490 ml Output 0 ml Net 490 ml Wt Readings from Last 3 Encounters:  
02/06/20 119.1 kg (262 lb 8 oz) 01/08/20 136.6 kg (301 lb 4 oz) 12/23/19 138.8 kg (306 lb) GEN - in no distress CV - regular, no murmur, no rub Lung - basilar rales bilaterally Abd - soft, nontender Ext - 1+ edema Recent Labs 02/06/20 
5122 02/05/20 
0286 02/04/20 
9110 02/04/20 
8122 WBC 14.8* 16.6*  --  10.1 HGB 8.3* 8.3*  --  7.8* HCT 26.9* 26.8*  --  25.8*  
 169  --  144* INR 2.5 2.3 1.4  --   
  
 
Recent Labs 02/06/20 
5586 02/05/20 
3139 02/04/20 
0309  136 134* K 4.0 4.6 4.7  100 98 CO2 29 29 29 BUN 35* 61* 47* CREA 5.06* 6.92* 5.69* CA 8.0* 7.9* 8.2*  
* 174* 254* MG 2.3 2.4  --   
PHOS  --  5.1*  --   
 
 
Assessment / Plan: 
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 
  DM type 2 (diabetes mellitus, type 2) ([address] (1/14/2013) Aortic valve stenosis (1/10/2020) CAD (coronary artery disease) (1/10/2020) Acute hypoxemic respiratory failure ([address] (1/10/2020) S/P AVR (1/10/2020) Acute renal failure (ARF) ([address] (1/11/2020) Atrial fibrillation ([address] (1/13/2020) Shock ([address] (1/15/2020) HIT (heparin-induced thrombocytopenia) ([facility] [address] (1/23/2020) Pleural effusion (2/3/2020) 1. DEJUAN/CKD - Previous baseline Cr around 1.8-1.9 
- No signs of renal recovery 
- HD in AM 
- New Permacath 2. S/p CABG 3. Volume overload 4. HIT+ - confirmed with SHARON

## 2020-02-06 NOTE — PROGRESS NOTES
Warfarin dosing per pharmacist 
 
Louisa Fernandez is a 69 y.o. male. Height: 5' 10\" (177.8 cm)    Weight: 119.1 kg (262 lb 8 oz) Indication:  AVR + Afib Goal INR:  2.5-3.5 Home dose:  New start Risk factors or significant drug interactions:  amiodarone Other anticoagulants:  Argatroban Daily Monitoring Date  INR     Warfarin dose HGB              Notes 2/4  1.4  2.5 mg  7.8   
2/5  2.3  2.5 mg  8.3 
2/6  2.5  2.5 mg  8.3 Pharmacy has been consulted to manage Mr. Mahendra Masters warfarin during this admission. Started on 2.5mg daily on 2/4 due to age, amiodarone interaction and concurrent argatroban. Recommend to continue current dosing. Pharmacy will follow daily INR and make adjustments as needed. Reminders: ? Argatroban falsely elevates the INR (typically INR is roughly double the true INR while on argatroban). ? If using argatroban to bridge to warfarin, the drip must overlap with warfarin therapy for at least 5 days, regardless of the INR value, to prevent recurrent thrombosis, skin necrosis or venous gangrene. ? When the INR while on argatroban and warfarin together reaches ? 4.0, stop the argatroban drip for 4-6 hours and check the true INR. ? If the true INR is > 2.0, the argatroban drip may be stopped and the patient can continue with just warfarin. (If have overlapped ? 5 days.) ? If the true INR is < 2.0, the argatroban drip should be resumed at the previous rate and will continue until true INR is > 2.0 Thank you, Laurie Jimenez, PharmD, BCCCP  [facility] [email or web address]

## 2020-02-07 NOTE — PROGRESS NOTES
[facility] Progress Note Follow-Up on: DEJUAN/CKD Patient seen and examined on HD, dialyzing via left  Qb, UF 4600, exertional SOB, tolerating UF, reports worked with PT yesterday, denies pain. Labs and chart reviewed ROS: 
Gen - no fever, no chills, appetite unchanged CV - no chest pain, no palpitation Lung - + shortness of breath, no cough Abd - no tenderness, no nausea/vomiting, no diarrhea Ext - + edema Exam: 
Vitals:  
 02/07/20 2740 02/07/20 2047 02/07/20 2839 02/07/20 9032 BP: 115/58  123/61 (!) 100/21 Pulse: 75  76 72 Resp: 20 21 Temp: 98.1 °F (36.7 °C)  97.2 °F (36.2 °C) SpO2: 97%  98% Weight:  120.8 kg (266 lb 4.8 oz) Height:      
 
 
 
Intake/Output Summary (Last 24 hours) at 2/7/2020 6251 Last data filed at 2/6/2020 2154 Gross per 24 hour Intake 480 ml Output 150 ml Net 330 ml Wt Readings from Last 3 Encounters:  
02/07/20 120.8 kg (266 lb 4.8 oz) 01/08/20 136.6 kg (301 lb 4 oz) 12/23/19 138.8 kg (306 lb) GEN - in no distress, alert and oriented CV - regular, no murmur, no rub Lung - basilar rales bilaterally, no wheezes Abd - soft, nontender Ext - 1+ BLE edema Access - Left TCC- intact Recent Labs 02/07/20 
1461 02/06/20 
2624 02/05/20 
4969 WBC  --  14.8* 16.6* HGB  --  8.3* 8.3* HCT  --  26.9* 26.8*  
PLT  --  176 169 INR 2.6 2.5 2.3 Recent Labs 02/07/20 
3041 02/06/20 
3653 02/05/20 
6832 * 137 136  
K 4.0 4.0 4.6 CL 99 101 100 CO2 28 29 29 BUN 55* 35* 61* CREA 6.69* 5.06* 6.92* CA 8.0* 8.0* 7.9*  
* 180* 174* MG 2.4 2.3 2.4 PHOS  --   --  5.1* Assessment / Plan: 
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 
  DM type 2 (diabetes mellitus, type 2) ([facility] (1/14/2013) Aortic valve stenosis (1/10/2020) CAD (coronary artery disease) (1/10/2020) Acute hypoxemic respiratory failure ([facility] (1/10/2020) S/P AVR (1/10/2020) Acute renal failure (ARF) ([facility] [address] (1/11/2020) Atrial fibrillation ([facility] [address] (1/13/2020) Shock ([facility] [address] (1/15/2020) HIT (heparin-induced thrombocytopenia) ([facility] [address] (1/23/2020) Pleural effusion (2/3/2020) 1. DEJUAN/CKD - Previous baseline Cr around 1.8-1.9 
- No signs of renal recovery 
-seen on HD, tolerating UF, catheter functioning well  
2. S/p CABG 3. Volume overload-improving with UF 
4. HIT+ - confirmed with SHARON

## 2020-02-07 NOTE — PROGRESS NOTES
Renea Menard Admission Date: 1/10/2020 Daily Progress Note: 2/7/2020 The patient's chart is reviewed and the patient is discussed with the staff. CABG x 3 and AVR on 1/10.  Slow to improve. Had small ptx that resolved without intervention. He has developed DEJUAN on CKD - now on dialysis. 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 Post op depression. Subjective: On 5-6L O2, still feels short of breath. No fevers. Seen in dialysis, tolerating well. No new symptosm. Current Facility-Administered Medications Medication Dose Route Frequency  warfarin (COUMADIN) tablet 5 mg  5 mg Oral QPM  
 albuterol (PROVENTIL VENTOLIN) nebulizer solution 2.5 mg  2.5 mg Nebulization QID RT  
 insulin glargine (LANTUS) injection 23 Units  23 Units SubCUTAneous DAILY  insulin glargine (LANTUS) injection 18 Units  18 Units SubCUTAneous QHS  ALPRAZolam (XANAX) tablet 0.25 mg  0.25 mg Oral QID PRN  
 loperamide (IMODIUM) capsule 2 mg  2 mg Oral Q4H PRN  
 insulin lispro (HUMALOG) injection   SubCUTAneous AC&HS  nitroglycerin (NITROBID) 2 % ointment 1 Inch  1 Inch Topical TID  busPIRone (BUSPAR) tablet 10 mg  10 mg Oral TID  oxyCODONE-acetaminophen (PERCOCET) 5-325 mg per tablet 1 Tab  1 Tab Per NG tube Q4H PRN  
 traMADol (ULTRAM) tablet 50 mg  50 mg Oral Q6H PRN  
 epoetin maritza-epbx (RETACRIT) 14,000 Units combo injection  14,000 Units SubCUTAneous Q7D  
 argatroban 50 mg in 0.9% sodium chloride 50 mL (1000 mcg/mL) infusion  0.5-10 mcg/kg/min IntraVENous TITRATE Objective:  
 
Vitals:  
 02/07/20 4447 02/07/20 0728 02/07/20 0800 02/07/20 0831 BP: 123/61 (!) 100/21 125/66 131/86 Pulse: 76 72 72 80 Resp: 21 Temp: 97.2 °F (36.2 °C) SpO2: 98% Weight:      
Height:      
 
Intake and Output:  
02/05 1901 - 02/07 0700 In: 720 [P.O.:720] Out: 150 [Urine:50; Drains:100] No intake/output data recorded. Physical Exam: Constitutional:  the patient is well developed and in no acute distress HEENT:  Sclera clear, pupils equal, oral mucosa moist 
Lungs: clear but decreased. Oxygen per nasal cannula - 4 liters. Cardiovascular:  RRR without M,G,R. Sinus per telemetry Abd/GI: soft and non-tender; with positive bowel sounds. Ext: warm without cyanosis. There is lower leg edema - left leg > right. Left leg dressing in place. Noted ischemic digits - hands and feet. Musculoskeletal: moves all four extremities with equal strength Skin:  no jaundice or rashes, left leg wounds Neuro: no gross neuro deficits Musculoskeletal: can ambulate (not witnessed, per his report). No deformity Psychiatric: Calm. Review of Systems - Review of Systems Respiratory: Positive for cough, sputum production and shortness of breath. Cardiovascular: Positive for leg swelling. Gastrointestinal:  
     Loss of appetite Psychiatric/Behavioral: Positive for depression. Lines: peripheral IV, dialysis catheter CHEST XRAY:  
 
LAB Recent Labs 02/07/20 
2458 02/06/20 
5059 02/05/20 
6894 WBC  --  14.8* 16.6* HGB  --  8.3* 8.3* HCT  --  26.9* 26.8*  
PLT  --  176 169 INR 2.6 2.5 2.3 Recent Labs 02/07/20 
4944 02/06/20 
0428 02/05/20 
0600 * 137 136  
K 4.0 4.0 4.6 CL 99 101 100 CO2 28 29 29 * 180* 174* BUN 55* 35* 61* CREA 6.69* 5.06* 6.92* MG 2.4 2.3 2.4 PHOS  --   --  5.1* Assessment:  (Medical Decision Making) Hospital Problems  Date Reviewed: 12/6/2019 Codes Class Noted POA Pleural effusion ICD-10-CM: J90 ICD-9-CM: 511.9  2/3/2020 Unknown CXR with opacity left base - not sure how much is fluid versus atelectasis HIT (heparin-induced thrombocytopenia) (HCC) ICD-10-CM: K35.74 ICD-9-CM: 289.84  1/23/2020 Unknown INR 2.5. remains on argatroban Shock ([facility] [address] ICD-10-CM: R57.9 ICD-9-CM: 785.50  1/15/2020 Unknown  
 resolved Atrial fibrillation [facility]) ICD-10-CM: I48.91 
ICD-9-CM: 427.31  1/13/2020 Unknown Sinus per telemetry Acute renal failure (ARF) ([facility]) ICD-10-CM: N17.9 ICD-9-CM: 584.9  1/11/2020 Unknown On dialysis - no signs of recovery yet Aortic valve stenosis ICD-10-CM: I35.0 ICD-9-CM: 424.1  1/10/2020 Unknown S/p AVR  
 CAD (coronary artery disease) ICD-10-CM: I25.10 ICD-9-CM: 414.00  1/10/2020 Unknown S/p CABG Acute hypoxemic respiratory failure ([facility] [address] ICD-10-CM: J96.01 
ICD-9-CM: 518.81  1/10/2020 Remains on oxygen support - complains of dyspnea * (Principal) S/P CABG x 3 ICD-10-CM: Z95.1 ICD-9-CM: V45.81  1/10/2020 Unknown S/P AVR ICD-10-CM: Z95.2 ICD-9-CM: V43.3  1/10/2020 Unknown DM type 2 (diabetes mellitus, type 2) ([facility]) ICD-10-CM: E11.9 ICD-9-CM: 250.00  1/14/2013 Yes BS upper 100s to 200s Plan:  (Medical Decision Making) 1. Complains of dyspnea - will U/S this afternoon, but with would have to hold coumadin over weekend and 6 hour hold of Argatroban Mon/Tuesday for thora if needed. 2. Dialysis per nephrology. No signs of recovery per their note 3. INR 2.6 - remains or argatroban and coumadin. Continue Argatroban until INR > 4 
4. Depression-Buspar restarted 5. Walked about 3 steps yesterday-needs PT/Rehab 6. On Ntg for digit ischemia 7. Monitor secretions Ottie Cooks, MD 
 
More than 50% of time documented was spent face-to-face contact with the patient and in the care of the patient on the floor/unit where the patient is located.

## 2020-02-07 NOTE — PROGRESS NOTES
Site check for PM placed 1-24-20 in R pectoral region by Dr Araseli Rush. Incision healing well without erythema or drainage.   
SURENDRA Ray

## 2020-02-07 NOTE — PROGRESS NOTES
Warfarin dosing per pharmacist 
 
Rica Rivera is a 69 y.o. male. Height: 5' 10\" (177.8 cm)    Weight: 120.8 kg (266 lb 4.8 oz) Indication:  AVR + Afib Goal INR:  2.5-3.5 Home dose:  New start Risk factors or significant drug interactions:  amiodarone Other anticoagulants:  Argatroban Daily Monitoring Date  INR     Warfarin dose HGB              Notes 2/4  1.4  2.5 mg  7.8   
2/5  2.3  2.5 mg  8.3 
2/6  2.5  2.5 mg  8.3 
2/7  2.6  5 mg Pharmacy has been consulted to manage Mr. sAter Monreal warfarin during this admission. Started on 2.5mg daily on 2/4 due to age, amiodarone interaction and concurrent argatroban. Patient is older however weight is >100 kg which usually requires higher doses. Will increase to 5 mg to try to reach INR of 4. Pharmacy will continue to follow patient and monitor INR daily. Reminders: ? Argatroban falsely elevates the INR (typically INR is roughly double the true INR while on argatroban). ? If using argatroban to bridge to warfarin, the drip must overlap with warfarin therapy for at least 5 days, regardless of the INR value, to prevent recurrent thrombosis, skin necrosis or venous gangrene. ? When the INR while on argatroban and warfarin together reaches ? 4.0, stop the argatroban drip for 4-6 hours and check the true INR. ? If the true INR is > 2.0, the argatroban drip may be stopped and the patient can continue with just warfarin. (If have overlapped ? 5 days.) ? If the true INR is < 2.0, the argatroban drip should be resumed at the previous rate and will continue until true INR is > 2.0 Thank you, Doretha Cardozo, Pharm. D. 
PGY1 Pharmacy Resident [phone]

## 2020-02-07 NOTE — PROGRESS NOTES
Bedside shift change report given to Gregorio Hunter RN (oncoming nurse) by Maria Luisa Balderrama (offgoing nurse). Report included the following information SBAR, Kardex, OR Summary, Intake/Output, MAR, Recent Results and Cardiac Rhythm NSR.

## 2020-02-07 NOTE — PROGRESS NOTES
PT Note: 
Re-evaluation attempted again this PM but pt was off the floor. Will re-attempt pending schedule and pt status. Thanks, Donald Newsome, PT, DPT

## 2020-02-07 NOTE — PROGRESS NOTES
Bedside shift change report given to Linda Banuelos RN (oncoming nurse) by Kenny Angulo RN (offgoing nurse). Report included the following information SBAR, Kardex, Procedure Summary, Intake/Output, MAR, Recent Results and Cardiac Rhythm NSR.

## 2020-02-07 NOTE — PROGRESS NOTES
Bedside shift change report given to [address] [place] [address] (oncoming nurse) by Monica Wheat RN (offgoing nurse). Report included the following information SBAR, Kardex, OR Summary, Procedure Summary, Intake/Output, MAR, Recent Results and Cardiac Rhythm NSR.

## 2020-02-07 NOTE — PROGRESS NOTES
This CM spoke with son regarding SNF placement choices for pt. They would like referral sent to [facility]. This CM confirmed SNF choice with pt - he is agreeable. This CM sent referral this day via [facility] [employer]. Will continue to follow.

## 2020-02-07 NOTE — DIALYSIS
TRANSFER IN - DIALYSIS Received patient in dialysis unit  from 2232 (unit) for ordered procedure. Consent verified for renal replacement therapy. Patient sob with nc 4l and vital signs stable. /51 P72 Hemodialysis initiated using cvc. Aspirated and flushed both ports without difficulty. Dressing clean, dry and intact. Machine settings per MD order. Heparin 0 unit bolus and 0 units/hr. Will monitor during treatment.

## 2020-02-07 NOTE — DIALYSIS
TRANSFER OUT- DIALYSIS Hemodialysis treatment completed without complications. Patient alert and VS stable  /57  P 75   
 
 4 Kgs removed. Flushed both ports with 10 mL of NS.  CVC dressing clean, dry, and intact, tego caps intact, bilateral lumens wrapped with 4x4 gauze. Meds given-Retacrit. Patient to room 2232 after dialysis.

## 2020-02-07 NOTE — PROGRESS NOTES
Requested patient get up in recliner patient refuses and states \"not right now\". Instructed patient on importance of sitting up in recliner for all meals patient verbalized understanding and continued to refuse to get OOB at this time. Patient also refused to wear rooke boots during the night while in bed instruction provided on how wearing this protective device helps to prevent breakdown of heels while in bed patient verbalized understanding and continued to refuse to wear them.

## 2020-02-07 NOTE — PROGRESS NOTES
PT Note: 
RE-evaluation attempted this AM but pt off the floor for HD. Will re-attempt pending schedule and pt status. Thanks, Jyoti Mendoza, PT, DPT

## 2020-02-07 NOTE — PROGRESS NOTES
Hospitalist Progress Note Admit Date:  1/10/2020  4:59 AM  
Name:  Juani Macario Age:  69 y.o. 
:  1945 MRN:  [id number] PCP:  Monique Gallo MD 
Treatment Team: Attending Provider: Nick Nick MD; Consulting Provider: Juaquin Hathaway MD; Consulting Provider: Holly Sullivan MD; Care Manager: Omega Dunn; Consulting Provider: Manny Miller MD; Utilization Review: Kalpana Mancera RN; Utilization Review: Mitzy Lauren RN; Consulting Provider: Charline Potts MD; Student Nurse: Acacia eDutsch Primary Nurse: Fatmata Bond, RN; Physical Therapist: Pamela Aylaa, PT, DPT Subjective: HPI and or CC: Copied from prior notes HPI: 
58-year-old  male patient, status post CABG and status post AVR, acute hypoxic respiratory failure presently on Airvo, acute kidney injury presently on dialysis, history of A. 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 
  
 
2020 Asked to follow-up patient regarding postoperative depression Managing blood sugarsimproving with difficult management due to sporadic oral intake in part related to his postoperative depression. He is on an anxiolytic BuSpar which he has taken chronically. Having insomnia and poor oral intake/poor appetite which he blames on the current diet/food. We discussed Remeron nocturnal dosing and possibility of morning sedation for up to several weeks and delayed onset of benefit. He appears to understand and is agreeable to start Remeron tonight and may continue his anxiolytic BuSpar. We have titrated slightly morning dose of insulin.   He seems to think that his evening/supper meal is his largest meal at present time.  We discussed possible need to titrate insulin if oral intake starts to improve. Objective:  
 
Patient Vitals for the past 24 hrs: 
 Temp Pulse Resp BP SpO2  
02/07/20 1143 97 °F (36.1 °C) 80 18 109/61 100 % 02/07/20 1121  75  112/57   
02/07/20 1104  77  102/53   
02/07/20 1030  74  104/51   
02/07/20 1005  73  102/62   
02/07/20 0932  79  135/72   
02/07/20 0902  78  123/69   
02/07/20 0831  80  131/86   
02/07/20 0800  72  125/66   
02/07/20 0728  72  (!) 100/21   
02/07/20 0653 97.2 °F (36.2 °C) 76 21 123/61 98 % 02/07/20 0323 98.1 °F (36.7 °C) 75 20 115/58 97 % 02/06/20 2319 98.1 °F (36.7 °C) 81 20 124/56 96 % 02/06/20 2108     93 % 02/06/20 1827 98 °F (36.7 °C) 83 20 116/54 93 % 02/06/20 1506 98.4 °F (36.9 °C) 75 16 126/58 98 % 02/06/20 1453     94 % Oxygen Therapy O2 Sat (%): 100 % (02/07/20 1143) Pulse via Oximetry: 75 beats per minute (02/07/20 0323) O2 Device: Nasal cannula (02/07/20 1143) O2 Flow Rate (L/min): 6 l/min (02/07/20 1143) O2 Temperature: 87.8 °F (31 °C) (02/01/20 0659) FIO2 (%): 40 % (02/06/20 2108) Intake/Output Summary (Last 24 hours) at 2/7/2020 1239 Last data filed at 2/7/2020 1121 Gross per 24 hour Intake 480 ml Output 4150 ml Net -3670 ml REVIEW OF SYSTEMS: Comprehensive ROS performed and negative except as stated in HPI. Physical Examination: 
General:    Negative thinking but no suicidal thoughts. Depressed affect. Head:  Mucous membranes moist nares are patent Eyes:  No roving eye movements, conjunctive are clear CV:   No obvious JVD or HJR, heart is regularedematousevaluated on dialysis Lungs:   Decreased breath sounds bilateral without gross consolidation Abdomen:   No palpable masses no gross distention bowel sounds are present all 4 quadrants Extremities: Moves all extremities no gross deformity Skin:     No rashes or jaundice. Neuro:  No gross focal deficits, no tremor Data Review: 
I have reviewed all labs, meds, telemetry events, and studies from the last 24 hours. Recent Results (from the past 24 hour(s)) GLUCOSE, POC Collection Time: 02/06/20  3:52 PM  
Result Value Ref Range Glucose (POC) 229 (H) 65 - 100 mg/dL GLUCOSE, POC Collection Time: 02/06/20  8:15 PM  
Result Value Ref Range Glucose (POC) 357 (H) 65 - 100 mg/dL PROTHROMBIN TIME + INR Collection Time: 02/07/20  3:32 AM  
Result Value Ref Range Prothrombin time 28.4 (H) 12.0 - 14.7 sec INR 2.6 METABOLIC PANEL, BASIC Collection Time: 02/07/20  3:32 AM  
Result Value Ref Range Sodium 135 (L) 136 - 145 mmol/L Potassium 4.0 3.5 - 5.1 mmol/L Chloride 99 98 - 107 mmol/L  
 CO2 28 21 - 32 mmol/L Anion gap 8 7 - 16 mmol/L Glucose 155 (H) 65 - 100 mg/dL BUN 55 (H) 8 - 23 MG/DL Creatinine 6.69 (H) 0.8 - 1.5 MG/DL  
 GFR est AA 11 (L) >60 ml/min/1.73m2 GFR est non-AA 9 (L) >60 ml/min/1.73m2 Calcium 8.0 (L) 8.3 - 10.4 MG/DL MAGNESIUM Collection Time: 02/07/20  3:32 AM  
Result Value Ref Range Magnesium 2.4 1.8 - 2.4 mg/dL PTT Collection Time: 02/07/20  3:32 AM  
Result Value Ref Range aPTT 65.7 (H) 24.3 - 35.4 SEC GLUCOSE, POC Collection Time: 02/07/20  5:46 AM  
Result Value Ref Range Glucose (POC) 147 (H) 65 - 100 mg/dL GLUCOSE, POC Collection Time: 02/07/20 10:35 AM  
Result Value Ref Range Glucose (POC) 127 (H) 65 - 100 mg/dL GLUCOSE, POC Collection Time: 02/07/20 11:46 AM  
Result Value Ref Range Glucose (POC) 114 (H) 65 - 100 mg/dL All Micro Results Procedure Component Value Units Date/Time CULTURE, BLOOD [[id number]] Collected:  01/14/20 1005 Order Status:  Completed Specimen:  Blood Updated:  01/19/20 0701 Special Requests: --     
  RIGHT 
ARTL (ART LINE) Culture result: NO GROWTH 5 DAYS     
 CULTURE, BLOOD [[id number]] Collected:  01/14/20 1144 Order Status:  Completed Specimen:  Blood Updated:  01/19/20 0701 Special Requests: --     
  LEFT 
HAND Culture result: NO GROWTH 5 DAYS     
 CULTURE, BLOOD [[id number]] Collected:  01/12/20 0148 Order Status:  Completed Specimen:  Blood Updated:  01/17/20 0740 Special Requests: --     
  RIGHT Antecubital 
  
  Culture result: NO GROWTH 5 DAYS     
 CULTURE, BLOOD [[id number]] Collected:  01/12/20 4037 Order Status:  Completed Specimen:  Blood Updated:  01/17/20 0740 Special Requests: --     
  LEFT 
HAND Culture result: NO GROWTH 5 DAYS     
 CULTURE, URINE [[id number]] Collected:  01/14/20 1950 Order Status:  Completed Specimen:  Urine from [place] Specimen Updated:  01/17/20 4676 Special Requests: NO SPECIAL REQUESTS Culture result: NO GROWTH 2 DAYS     
 CULTURE, RESPIRATORY/SPUTUM/BRONCH Tatyana Cleverly [[id number]] Collected:  01/14/20 1132 Order Status:  Completed Specimen:  Sputum,ET Suction Updated:  01/16/20 1495 Special Requests: NO SPECIAL REQUESTS     
  GRAM STAIN 15 TO 20 WBCS SEEN PER OIF  
   0 TO 1 EPITHELIAL CELLS SEEN PER OIF  
   1+ MUCUS PRESENT     
   FEW GRAM POSITIVE RODS     
   FEW GRAM POSITIVE COCCI Culture result:    
  LIGHT NORMAL RESPIRATORY BRENNAN  
     
 CULTURE, RESPIRATORY/SPUTUM/BRONCH W Collin Tse [[id number]] Collected:  01/12/20 0336 Order Status:  Completed Specimen:  Sputum from Endotracheal aspirate Updated:  01/14/20 9695 Special Requests: NO SPECIAL REQUESTS     
  GRAM STAIN 15 TO 20 WBCS SEEN PER OIF  
   0 TO 1 EPITHELIAL CELLS SEEN PER OIF  
   2+ MUCUS PRESENT     
   RARE GRAM POSITIVE COCCI Culture result:    
  LIGHT NORMAL RESPIRATORY BRENNAN  
     
 CULTURE, URINE [[id number]] Collected:  01/12/20 0901 Order Status:  Completed Specimen:  Urine from [place] Specimen Updated:  01/14/20 0725 Special Requests: NO SPECIAL REQUESTS Culture result: NO GROWTH 2 DAYS Current Meds: Current Facility-Administered Medications Medication Dose Route Frequency  warfarin (COUMADIN) tablet 5 mg  5 mg Oral QPM  
 insulin glargine (LANTUS) injection 25 Units  25 Units SubCUTAneous DAILY  mirtazapine (REMERON) tablet 15 mg  15 mg Oral QHS  albuterol (PROVENTIL VENTOLIN) nebulizer solution 2.5 mg  2.5 mg Nebulization QID RT  
 insulin glargine (LANTUS) injection 18 Units  18 Units SubCUTAneous QHS  ALPRAZolam (XANAX) tablet 0.25 mg  0.25 mg Oral QID PRN  
 loperamide (IMODIUM) capsule 2 mg  2 mg Oral Q4H PRN  
 insulin lispro (HUMALOG) injection   SubCUTAneous AC&HS  nitroglycerin (NITROBID) 2 % ointment 1 Inch  1 Inch Topical TID  busPIRone (BUSPAR) tablet 10 mg  10 mg Oral TID  oxyCODONE-acetaminophen (PERCOCET) 5-325 mg per tablet 1 Tab  1 Tab Per NG tube Q4H PRN  
 traMADol (ULTRAM) tablet 50 mg  50 mg Oral Q6H PRN  
 epoetin maritza-epbx (RETACRIT) 14,000 Units combo injection  14,000 Units SubCUTAneous Q7D  
 argatroban 50 mg in 0.9% sodium chloride 50 mL (1000 mcg/mL) infusion  0.5-10 mcg/kg/min IntraVENous TITRATE Diet: DIET NUTRITIONAL SUPPLEMENTS 
DIET CARDIAC Other Studies (last 24 hours): No results found. Assessment and Plan:  
 
Hospital Problems as of 2/7/2020 Date Reviewed: 12/6/2019 Codes Class Noted - Resolved POA Pleural effusion ICD-10-CM: J90 ICD-9-CM: 511.9  2/3/2020 - Present Unknown HIT (heparin-induced thrombocytopenia) (HCC) ICD-10-CM: L11.99 ICD-9-CM: 289.84  1/23/2020 - Present Unknown Bilateral pneumothorax ICD-10-CM: J93.9 ICD-9-CM: 512.89  1/17/2020 - Present Thrombocytopenia ([facility] [address] ICD-10-CM: D69.6 ICD-9-CM: 287.5  1/17/2020 - Present [facility] ([facility] ICD-10-CM: R57.9 ICD-9-CM: 785.50  1/15/2020 - Present Unknown Atrial fibrillation [facility]) ICD-10-CM: I48.91 
ICD-9-CM: 427.31  1/13/2020 - Present Unknown Acute renal failure (ARF) ([facility]) ICD-10-CM: N17.9 ICD-9-CM: 584.9  1/11/2020 - Present Unknown Aortic valve stenosis ICD-10-CM: I35.0 ICD-9-CM: 424.1  1/10/2020 - Present Unknown CAD (coronary artery disease) ICD-10-CM: I25.10 ICD-9-CM: 414.00  1/10/2020 - Present Unknown Weaning from respirator [facility]) ICD-10-CM: Z99.11 ICD-9-CM: V46.13  1/10/2020 - Present Acute hypoxemic respiratory failure ([address] ICD-10-CM: J96.01 
ICD-9-CM: 518.81  1/10/2020 - Present * (Principal) S/P CABG x 3 ICD-10-CM: Z95.1 ICD-9-CM: V45.81  1/10/2020 - Present Unknown S/P AVR ICD-10-CM: Z95.2 ICD-9-CM: V43.3  1/10/2020 - Present Unknown DM type 2 (diabetes mellitus, type 2) (HCC) ICD-10-CM: E11.9 ICD-9-CM: 250.00  1/14/2013 - Present Yes RESOLVED: Metabolic acidosis [facility]-: E87.2 ICD-9-CM: 276.2  1/11/2020 - 1/16/2020 Unknown RESOLVED: Hyperkalemia ICD-10-CM: E87.5 ICD-9-CM: 276.7  1/11/2020 - 1/16/2020 Unknown A/P:   
--Postoperative depressioncontinue anxiolytic BuSpar, start Remeron which may help with insomnia and eventually appetite and expect morning sedation at least next 4 to 5 days possibly longer. Delayed antidepressant effect as with all antidepressants. Discussed with patient. --Diabetes management Improved titrate morning dose slightly. Hopefully oral intake will improve and may need further titration of long-acting insulin and continue prandial coverage. Signed: 
Stephane CONLEY

## 2020-02-07 NOTE — PROCEDURES
PROCEDURE: 
DIAGNOSTIC ULTRASOUND OF CHEST 
 
DIAGNOSIS: 
LEFT PLEURAL EFFUSION 
 
CHEST ULTRASOUND FINDINGS: 
 
A [employer] NextMedium ultrasound was used to image the chest and localize the pleural effusion on the bilateral  chest. 
 
A small anechoic space was seen on the left consistent with an uncomplicated pleural effusion. 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 The effusion was quite small and not worth thoracentesis particularly with HIT on coumadin and Argatroban. Will need to consider SNIFF test at some point, but for now would continue with mobility, PT, IS, rehab. There was no effusion on the right. IMAGE: 
Scanned to chart.  
 
 
Yonatan Velasquez MD

## 2020-02-07 NOTE — PROGRESS NOTES
Today's Date: 2/7/2020 Date of Admission: 1/10/2020 Chart Reviewed. Subjective:  
 
Patient seen on HD and has multiple complaints. He feels depressed. Medications Reviewed. Objective:  
 
Vitals:  
 02/07/20 9333 02/07/20 0902 02/07/20 0932 02/07/20 1005 BP: 131/86 123/69 135/72 102/62 Pulse: 80 78 79 73 Resp:      
Temp:      
SpO2:      
Weight:      
Height:      
 
 
Intake and Output Current Shift: No intake/output data recorded. Last 3 Shifts: 02/05 1901 - 02/07 0700 In: 720 [P.O.:720] Out: 150 [Urine:50; Drains:100] Physical Exam: 
General: Well Developed, Obese, No Acute Distress, Alert & Oriented x 3, Appropriate mood Neck: supple, no JVD Heart: S1S2 with RRR Lungs: Clear throughout auscultation bilaterally Abd: soft, nontender, nondistended, with good bowel sounds Ext: + edema bilaterally Sternal incision: clean, dry, and intact Skin: warm and dry LABS Data Review:  
Recent Labs 02/07/20 
5642 02/06/20 
9627 02/05/20 
9712 * 137 136  
K 4.0 4.0 4.6 MG 2.4 2.3 2.4 BUN 55* 35* 61* CREA 6.69* 5.06* 6.92* * 180* 174* WBC  --  14.8* 16.6* HGB  --  8.3* 8.3* HCT  --  26.9* 26.8*  
PLT  --  176 169 INR 2.6 2.5 2.3 Estimated Creatinine Clearance: 12.8 mL/min (A) (based on SCr of 6.69 mg/dL (H)). Assessment/Plan:  
 
Principal Problem: 
  S/P CABG x 3 (1/10/2020) Transferred to  stepdown, on O2 by NC, wound vac placed back on left leg, on HD, platelets improving, on Argatroban and coumadin, will continue to overlap until INR >4 then check true INR Active Problems: 
  Aortic valve stenosis (1/10/2020) 
  S/p AVR 
  
  CAD (coronary artery disease) (1/10/2020) S/p CABG 
  
  Weaning from respirator [facility]) (1/10/2020)   
  
  Acute hypoxemic respiratory failure ([address] (1/10/2020) On O2 by NC 
  
  S/P AVR (1/10/2020)   
  
  Acute renal failure (ARF) ([facility] (1/11/2020) On HD, nephrology following 
  
   Atrial fibrillation ([facility] [address] (1/13/2020) Intermittent, continue oral amiodarone, no BB with low BP at times 
  
  Shock ([address] (1/15/2020) 
resolved 
  
  Bilateral pneumothorax (1/17/2020) 
resolved 
  
  Thrombocytopenia ([address] (1/17/2020) 
  Due to HIT, improving  
  
  HIT (heparin-induced thrombocytopenia) ([facility] [address] (1/23/2020) 
  On argatroban/coumadin 
  
  Pleural effusion (2/3/2020)   
  
  Depression Pt has been stating for several days that he is depressed, on Buspar for anxiety, will defer to hospitalist as far as adding medication to Buspar or changing to medication to treat both anxiety and depression Dispo 
 will need STR Erika De La Rosa PA-C

## 2020-02-08 NOTE — PROGRESS NOTES
Problem: Diabetes Self-Management Goal: *Using medications safely Description State effect of diabetes medications on diabetes; name diabetes medication taking, action and side effects. Outcome: Progressing Towards Goal 
  
Problem: Falls - Risk of 
Goal: *Absence of Falls Description Document Amparo Rangel Fall Risk and appropriate interventions in the flowsheet. Outcome: Progressing Towards Goal 
Note: Fall Risk Interventions: 
Mobility Interventions: Communicate number of staff needed for ambulation/transfer, Patient to call before getting OOB Mentation Interventions: Bed/chair exit alarm, Adequate sleep, hydration, pain control, Door open when patient unattended, Increase mobility Medication Interventions: Patient to call before getting OOB, Evaluate medications/consider consulting pharmacy, Bed/chair exit alarm Elimination Interventions: Call light in reach, Bed/chair exit alarm, Patient to call for help with toileting needs History of Falls Interventions: Bed/chair exit alarm, Consult care management for discharge planning, Evaluate medications/consider consulting pharmacy Problem: Patient Education: Go to Patient Education Activity Goal: Patient/Family Education Outcome: Progressing Towards Goal 
  
Problem: Cardiac Valve Surgery: Post-Op Day 3 Goal: *Hemodynamically stable without vasoactive medications Outcome: Progressing Towards Goal 
Goal: *Stable cardiac rhythm Outcome: Progressing Towards Goal

## 2020-02-08 NOTE — PROGRESS NOTES
Warfarin dosing per pharmacist 
 
He Mancera is a 69 y.o. male. Height: 5' 10\" (177.8 cm)    Weight: 110.5 kg (243 lb 11.2 oz) Indication:  AVR + Afib Goal INR:  2.5-3.5 Home dose:  New start Risk factors or significant drug interactions:  amiodarone Other anticoagulants:  Argatroban Daily Monitoring Date  INR     Warfarin dose HGB              Notes 2/4  1.4  2.5 mg  7.8   
2/5  2.3  2.5 mg  8.3 
2/6  2.5  2.5 mg  8.3 
2/7  2.6  5 mg   --- 
2/8  2.6  5 mg  8 Pharmacy has been consulted to manage Mr. Lanette Holt warfarin during this admission. Started on 2.5mg daily on 2/4 due to age, amiodarone interaction and concurrent argatroban. Pharmacy will continue to follow patient and monitor INR daily. Reminders: ? Argatroban falsely elevates the INR (typically INR is roughly double the true INR while on argatroban). ? If using argatroban to bridge to warfarin, the drip must overlap with warfarin therapy for at least 5 days, regardless of the INR value, to prevent recurrent thrombosis, skin necrosis or venous gangrene. ? When the INR while on argatroban and warfarin together reaches ? 4.0, stop the argatroban drip for 4-6 hours and check the true INR. ? If the true INR is > 2.0, the argatroban drip may be stopped and the patient can continue with just warfarin. (If have overlapped ? 5 days.) ? If the true INR is < 2.0, the argatroban drip should be resumed at the previous rate and will continue until true INR is > 2.0 Thank you, Meenakshi Araya, PharmD Clinical Pharmacist 
[phone]

## 2020-02-08 NOTE — PROGRESS NOTES
Bedside shift change report given to [address] [place] [address] (oncoming nurse) by Nimo Freeman RN (offgoing nurse). Report included the following information SBAR, Kardex, MAR, Recent Results and Cardiac Rhythm NSR.

## 2020-02-08 NOTE — PROGRESS NOTES
Attempted PT treatment 2nd time but patient was having lunch. Will try again as schedule allows.  
 
Allison Abreu DPT

## 2020-02-08 NOTE — PROGRESS NOTES
GOALS: (revised goals 2/8/20) 1. Patient will transfer supine to sit with modified independence within 5 treatment days 2. Patient will transfer sit to stand with supervision within 5 treatment days 3. Patient will ambulate ' with the r/walker with supervision within 5 treatment days 4. Patient will participate in BLE AROM exercises in supine, sitting and standing to improve functional strength and mobility within 5 treatment days. PHYSICAL THERAPY: Re-evaluation and AM 2/8/2020 INPATIENT: PT Visit Days : 6 Payor: Sally Marinelli / Plan: Medina Rose / Product Type: [employer] Medicare /   
  
NAME/AGE/GENDER: Jim Gómez is a 69 y.o. male PRIMARY DIAGNOSIS: Atherosclerosis of native coronary artery of native heart with unstable angina pectoris ([address] [I25.110] Nonrheumatic aortic valve stenosis [I35.0] CAD (coronary artery disease) [I25.10] Aortic valve stenosis [I35.0] S/P CABG x 3 S/P CABG x 3 Procedure(s) (LRB): ULTRASOUND on THinners (Bilateral) 5 Days Post-Op ICD-10: Treatment Diagnosis:  
 · Other abnormalities of gait and mobility (R26.89) Precaution/Allergies: 
Heparin; Amoxicillin; Keflex [cephalexin]; and Pcn [penicillins] ASSESSMENT:  
 
Mr. Anabel Moreland underwent above surgery on 1/10/20 and was only recently extubated. He lives with his wife and ambulates independently with cane or RW at baseline. Patient presents sitting up in the bedside chair stating his bottom is hurting. He is slightly frustrated that he cannot go back to bed yet stating he has been sitting up for almost 2 hours. Nursing came to the bedside to change his bedding to allow him to get back to the bed. PT worked on sit to stand 3 times allow time for him to be off his bottom but this did not ease his pain very much. He required CGA for sit to stand and stood using the r/walker for light UE support.   He is able to scoot in his chair and change his positions but his bottom continues to hurt with all activity. His BLE AROM is WFLs. Once his bed was ready, he stood with CGA and took ~ 8 steps from the chair to the bed using the r/walker with CGA. His standing balance was steady and he had a functional step pattern. He was seated on the EOB and was eager to return to supine with CGA to help his legs up on the bed. He positioned himself in side lying and was left with nursing at the bedside discussing the timing of getting his bath and getting cleaned up. Mr. Genie Clark appears to be making slow progress towards his goals with his more immediate issue being his bottom pain. This section established at most recent assessment PROBLEM LIST (Impairments causing functional limitations): 1. Decreased Strength 2. Decreased ADL/Functional Activities 3. Decreased Transfer Abilities 4. Decreased Ambulation Ability/Technique 5. Decreased Balance 6. Decreased Activity Tolerance 7. Increased Fatigue 8. Increased Shortness of Breath 9. Decreased Knowledge of Precautions INTERVENTIONS PLANNED: (Benefits and precautions of physical therapy have been discussed with the patient.) 1. Balance Exercise 2. Bed Mobility 3. Neuromuscular Re-education/Strengthening 4. Therapeutic Activites 5. Therapeutic Exercise/Strengthening 6. Transfer Training 7. education TREATMENT PLAN: Frequency/Duration: twice daily for duration of hospital stay Rehabilitation Potential For Stated Goals: Good REHAB RECOMMENDATIONS (at time of discharge pending progress):   
Placement: It is my opinion, based on this patient's performance to date, that Mr. Genie Clark may benefit from intensive therapy at a [address] [place] [address] after discharge due to the functional deficits listed above that are likely to improve with skilled rehabilitation and severe debility. Equipment:  
? tbd  
? None at this time HISTORY:  
 History of Present Injury/Illness (Reason for Referral): 
Patient admitted for above surgery. Past Medical History/Comorbidities:  
Mr. Jaylin Montgomery  has a past medical history of Arthritis, BPH (benign prostatic hyperplasia) (1/14/2013), CAD (coronary artery disease), DM type 2 (diabetes mellitus, type 2) ([facility] (dx 2004), Dyspnea, Gout (1/14/2013), HLD (hyperlipidemia) (1/14/2013), HTN (hypertension), Morbid obesity ([facility] [address] (9/3/14), Psychiatric disorder, Rheumatic fever, Seasonal allergic rhinitis, Severe aortic valve stenosis, Thyroid disease, and Unspecified sleep apnea (2016). Mr. Jaylin Montgomery  has a past surgical history that includes hx tonsil and adenoidectomy; hx heart catheterization (12/23/2019); hx orthopaedic (Left); hx cataract removal (Bilateral, 2012); and ir insert tunl cvc w port over 5 years (2/4/2020). Social History/Living Environment:  
Home Environment: Private residence # Steps to Enter: 1 One/Two Story Residence: One story Living Alone: No 
Support Systems: Spouse/Significant Other/Partner Patient Expects to be Discharged to[de-identified] Private residence Current DME Used/Available at Home: Cane, straight, Shower chair Tub or Shower Type: Shower Prior Level of Function/Work/Activity: 
 
He lives with his wife and ambulates independently with cane or RW at baseline. Number of Personal Factors/Comorbidities that affect the Plan of Care: 3+: HIGH COMPLEXITY EXAMINATION:  
Most Recent Physical Functioning:  
Gross Assessment: 
  
         
  
Posture: 
  
Balance: 
Sitting: Intact Sitting - Static: Good (unsupported) Sitting - Dynamic: Good (unsupported) Standing: Impaired Standing - Static: Good;Constant support Standing - Dynamic : Fair;Good;Constant support Bed Mobility: 
Rolling: Modified independent Supine to Sit: Contact guard assistance;Minimum assistance Sit to Supine: Contact guard assistance Scooting: Stand-by assistance Wheelchair Mobility: 
  
Transfers: Sit to Stand: Contact guard assistance Stand to Sit: Contact guard assistance Stand Pivot Transfers: Contact guard assistance Bed to Chair: Contact guard assistance Gait: 
  
Base of Support: Narrowed Speed/[place]: Slow Step Length: Left shortened;Right shortened Distance (ft): 8 Feet (ft) Assistive Device: Walker, rolling Ambulation - Level of Assistance: Contact guard assistance Interventions: Safety awareness training;Verbal cues Body Structures Involved: 1. Heart 2. Lungs 3. Muscles Body Functions Affected: 1. Cardio 2. Respiratory 3. Neuromusculoskeletal 
4. Movement Related Activities and Participation Affected: 1. Mobility 2. Self Care 3. Domestic Life 4. Community, Social and [place] Number of elements that affect the Plan of Care: 4+: HIGH COMPLEXITY CLINICAL PRESENTATION:  
Presentation: Evolving clinical presentation with changing clinical characteristics: MODERATE COMPLEXITY CLINICAL DECISION MAKIN[address] [place] [id number] AM-PAC 6 Clicks Basic Mobility Inpatient Short Form How much difficulty does the patient currently have. .. Unable A Lot A Little None 1. Turning over in bed (including adjusting bedclothes, sheets and blankets)? [x] 1   [] 2   [] 3   [] 4  
2. Sitting down on and standing up from a chair with arms ( e.g., wheelchair, bedside commode, etc.)   [x] 1   [] 2   [] 3   [] 4  
3. Moving from lying on back to sitting on the side of the bed? [x] 1   [] 2   [] 3   [] 4 How much help from another person does the patient currently need. .. Total A Lot A Little None 4. Moving to and from a bed to a chair (including a wheelchair)? [x] 1   [] 2   [] 3   [] 4  
5. Need to walk in hospital room? [x] 1   [] 2   [] 3   [] 4  
6. Climbing 3-5 steps with a railing? [x] 1   [] 2   [] 3   [] 4  
© , Trustees of [address] [place] [id number], under license to [employer]. All rights reserved Score:  Initial: 6 Most Recent: X (Date: -- ) Interpretation of Tool:  Represents activities that are increasingly more difficult (i.e. Bed mobility, Transfers, Gait). Medical Necessity:    
· Patient is expected to demonstrate progress in  
· strength, range of motion, balance, coordination, functional technique, and activity tolerance ·  to  
· decrease assistance required with all functional mobility · . Reason for Services/Other Comments: 
· Patient continues to require skilled intervention due to · medical complications and patient unable to attend/participate in therapy as expected · . Use of outcome tool(s) and clinical judgement create a POC that gives a: Questionable prediction of patient's progress: MODERATE COMPLEXITY  
  
 
 
 
TREATMENT:  
(In addition to Assessment/Re-Assessment sessions the following treatments were rendered) Pre-treatment Symptoms/Complaints: \"My bottom is really hurting this morning\" Pain: Initial: 0 FLACC Pain Intensity 1: 7 Pain Location 1: Buttocks  Post Session:  7/10 Reassessment Gait Training ( 15 mins):  Gait training to improve and/or restore physical functioning as related to mobility, strength and balance. Ambulated 8 Feet (ft) with Contact guard assistance using a Walker, rolling and minimal Safety awareness training;Verbal cues related to their sit to stand and standing  to promote proper body alignment, promote proper body posture and promote proper body mechanics. DATE: 1/26/20 1/29/20 2/3/20 2/6/20 2/8/20 Straight leg raise     10 B Hip abduct/ adduct X5 AAB    10 B Heel slides  X3 AB  10 x 2 B Hip external/ internal rotation Ankle dorsiflexion/ plantarflexion X5 AB 25 10 x 2 B 25 10 B Sitting unsupported x5'       
LAQ  25  25 10 B   
marching  25  25 10 B Sit to stand     5 times Key:  A=active, AA=active assisted, P=passive, B=bilaterally, R=right, L=left Braces/Orthotics/Lines/Etc:  
· Wound vac · O2 Device: Heated, Hi flow nasal cannula 4L with O2 sats at 98% Treatment/Session Assessment:   
· Response to Treatment: Participated minimally with much encouragement from the PT and nurse. His bottom was very painful to him today and he was focused on getting back to bed and lying down on his side to get off it. · Interdisciplinary Collaboration:  
o Physical Therapist 
o Registered Nurse · After treatment position/precautions:  
o Supine in bed 
o Bed alarm/tab alert on 
o Bed/Chair-wheels locked 
o Call light within reach 
o Nurse at bedside · Compliance with Program/Exercises: Compliant all of the time · Recommendations/Intent for next treatment session: \"Next visit will focus on advancements to more challenging activities and reduction in assistance provided\". Total Treatment Duration: PT Patient Time In/Time Out Time In: 1 Time Out: [id number] Leann Malcolm

## 2020-02-08 NOTE — PROGRESS NOTES
Hospitalist Progress Note Admit Date:  1/10/2020  4:59 AM  
Name:  Elayne Rod Age:  69 y.o. 
:  1945 MRN:  [id number] PCP:  Aurora Macias MD 
Treatment Team: Attending Provider: Enio Brito MD; Consulting Provider: Lay Castillo MD; Consulting Provider: Troy Wen MD; Care Manager: Anna Betancourt; Consulting Provider: Marlen Jackson MD; Utilization Review: Jeyson Bell RN; Utilization Review: Hina Ojeda RN; Consulting Provider: Yajaira Mcmullen MD; Student Nurse: Anais Martinez; Utilization Review: Lyndall Brittle; Primary Nurse: Sukhwinder English RN Subjective: HPI and or CC: Copied from prior notes HPI: 
52-year-old  male patient, status post CABG and status post AVR, acute hypoxic respiratory failure presently on Airvo, acute kidney injury presently on dialysis, history of A. 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 
  
 
2020 Follow-up regarding diabetes management and depression Tolerated dose of Remeron last night subjectively. No significant morning fatigue. Appetite improved some but not likely medication effect yet. Morning sugars controlled increase as day progressed. Increased morning dose of Lantus today. Will titrate further. Will likely need to continue titration as oral intake hopefully improves. Objective:  
 
Patient Vitals for the past 24 hrs: 
 Temp Pulse Resp BP SpO2  
20 1539     94 % 20 1515 97.5 °F (36.4 °C) 81 18 109/59 95 % 20 1152 98 °F (36.7 °C) 83 20 102/55 97 % 20 1103     94 % 20 0751     94 % 20 0717 97.3 °F (36.3 °C) 82 18 121/60 97 % 20 0340 98.4 °F (36.9 °C) 79 20 110/52 98 % 02/07/20 2247 98.1 °F (36.7 °C) 82 18 122/57 98 % 02/07/20 1920     94 % 02/07/20 1834 97.5 °F (36.4 °C) 87 20 131/59 96 % 02/07/20 1620 98.2 °F (36.8 °C) 84 19 101/55 94 % Oxygen Therapy O2 Sat (%): 94 % (02/08/20 1539) Pulse via Oximetry: 84 beats per minute (02/08/20 1539) O2 Device: Nasal cannula (02/08/20 1539) O2 Flow Rate (L/min): 4 l/min (02/08/20 1539) O2 Temperature: 87.8 °F (31 °C) (02/01/20 0659) FIO2 (%): 40 % (02/06/20 2108) Intake/Output Summary (Last 24 hours) at 2/8/2020 1609 Last data filed at 2/8/2020 0951 Gross per 24 hour Intake 701.76 ml Output 100 ml Net 601.76 ml REVIEW OF SYSTEMS: Comprehensive ROS performed and negative except as stated in HPI. Physical Examination: 
General:    Alert, more engaging today, denies suicidal thoughts Head:  Nares patent, no facial asymmetry, Eyes:  Conjunctive are clear pupils equal round reactive to light no scleral icterus CV:   Regular rate and rhythm no gross S3 gallop Lungs:   Decreased breath sounds bases no gross wheezing. Symmetric excursion Abdomen:   Soft, bowel sounds x4 quadrants no localized tenderness Extremities: No deformity, moves all extremities well, 
Skin:     No petechia or purpura grossly Psychiatricas above no suicidal ideations. More relaxed today. Still with depressed thoughts about being in the hospitalsurgery was January 10 CABG and AVR Data Review: 
I have reviewed all labs, meds, telemetry events, and studies from the last 24 hours. Recent Results (from the past 24 hour(s)) GLUCOSE, POC Collection Time: 02/07/20  4:22 PM  
Result Value Ref Range Glucose (POC) 251 (H) 65 - 100 mg/dL GLUCOSE, POC Collection Time: 02/07/20  8:54 PM  
Result Value Ref Range Glucose (POC) 176 (H) 65 - 100 mg/dL PROTHROMBIN TIME + INR Collection Time: 02/08/20  3:42 AM  
Result Value Ref Range Prothrombin time 28.4 (H) 12.0 - 14.7 sec INR 2.6 METABOLIC PANEL, BASIC Collection Time: 02/08/20  3:42 AM  
Result Value Ref Range Sodium 136 136 - 145 mmol/L Potassium 3.9 3.5 - 5.1 mmol/L Chloride 100 98 - 107 mmol/L  
 CO2 29 21 - 32 mmol/L Anion gap 7 7 - 16 mmol/L Glucose 105 (H) 65 - 100 mg/dL BUN 36 (H) 8 - 23 MG/DL Creatinine 5.07 (H) 0.8 - 1.5 MG/DL  
 GFR est AA 14 (L) >60 ml/min/1.73m2 GFR est non-AA 12 (L) >60 ml/min/1.73m2 Calcium 7.9 (L) 8.3 - 10.4 MG/DL  
CBC W/O DIFF Collection Time: 02/08/20  3:42 AM  
Result Value Ref Range WBC 10.1 4.3 - 11.1 K/uL  
 RBC 2.77 (L) 4.23 - 5.6 M/uL HGB 8.0 (L) 13.6 - 17.2 g/dL HCT 26.1 (L) 41.1 - 50.3 % MCV 94.2 79.6 - 97.8 FL  
 MCH 28.9 26.1 - 32.9 PG  
 MCHC 30.7 (L) 31.4 - 35.0 g/dL  
 RDW 14.2 11.9 - 14.6 % PLATELET 067 686 - 583 K/uL MPV 10.3 9.4 - 12.3 FL ABSOLUTE NRBC 0.00 0.0 - 0.2 K/uL PTT Collection Time: 02/08/20  3:42 AM  
Result Value Ref Range aPTT 64.4 (H) 24.3 - 35.4 SEC GLUCOSE, POC Collection Time: 02/08/20  6:45 AM  
Result Value Ref Range Glucose (POC) 112 (H) 65 - 100 mg/dL GLUCOSE, POC Collection Time: 02/08/20 10:55 AM  
Result Value Ref Range Glucose (POC) 258 (H) 65 - 100 mg/dL All Micro Results Procedure Component Value Units Date/Time CULTURE, BLOOD [[id number]] Collected:  01/14/20 1005 Order Status:  Completed Specimen:  Blood Updated:  01/19/20 0701 Special Requests: --     
  RIGHT 
ARTL (ART LINE) Culture result: NO GROWTH 5 DAYS     
 CULTURE, BLOOD [[id number]] Collected:  01/14/20 1145 Order Status:  Completed Specimen:  Blood Updated:  01/19/20 0701 Special Requests: --     
  LEFT 
HAND Culture result: NO GROWTH 5 DAYS     
 CULTURE, BLOOD [[id number]] Collected:  01/12/20 3292 Order Status:  Completed Specimen:  Blood Updated:  01/17/20 0740 Special Requests: --     
  RIGHT Antecubital 
  
  Culture result: NO GROWTH 5 DAYS CULTURE, BLOOD [[id number]] Collected:  01/12/20 1386 Order Status:  Completed Specimen:  Blood Updated:  01/17/20 0740 Special Requests: --     
  LEFT 
HAND Culture result: NO GROWTH 5 DAYS     
 CULTURE, URINE [[id number]] Collected:  01/14/20 1950 Order Status:  Completed Specimen:  Urine from [place] Specimen Updated:  01/17/20 9608 Special Requests: NO SPECIAL REQUESTS Culture result: NO GROWTH 2 DAYS     
 CULTURE, RESPIRATORY/SPUTUM/BRONCH Maria Teresa Rodgers [[id number]] Collected:  01/14/20 1132 Order Status:  Completed Specimen:  Sputum,ET Suction Updated:  01/16/20 8835 Special Requests: NO SPECIAL REQUESTS     
  GRAM STAIN 15 TO 20 WBCS SEEN PER OIF  
   0 TO 1 EPITHELIAL CELLS SEEN PER OIF  
   1+ MUCUS PRESENT     
   FEW GRAM POSITIVE RODS     
   FEW GRAM POSITIVE COCCI Culture result:    
  LIGHT NORMAL RESPIRATORY BRENNAN  
     
 CULTURE, RESPIRATORY/SPUTUM/BRONCH W Maame Bautista [[id number]] Collected:  01/12/20 0327 Order Status:  Completed Specimen:  Sputum from Endotracheal aspirate Updated:  01/14/20 0589 Special Requests: NO SPECIAL REQUESTS     
  GRAM STAIN 15 TO 20 WBCS SEEN PER OIF  
   0 TO 1 EPITHELIAL CELLS SEEN PER OIF  
   2+ MUCUS PRESENT     
   RARE GRAM POSITIVE COCCI Culture result:    
  LIGHT NORMAL RESPIRATORY BRENNAN  
     
 CULTURE, URINE [[id number]] Collected:  01/12/20 0901 Order Status:  Completed Specimen:  Urine from [place] Specimen Updated:  01/14/20 0725 Special Requests: NO SPECIAL REQUESTS Culture result: NO GROWTH 2 DAYS Current Meds: 
Current Facility-Administered Medications Medication Dose Route Frequency  warfarin (COUMADIN) tablet 5 mg  5 mg Oral QPM  
 insulin glargine (LANTUS) injection 25 Units  25 Units SubCUTAneous DAILY  mirtazapine (REMERON) tablet 15 mg  15 mg Oral QHS  albuterol (PROVENTIL VENTOLIN) nebulizer solution 2.5 mg  2.5 mg Nebulization QID RT  
  insulin glargine (LANTUS) injection 18 Units  18 Units SubCUTAneous QHS  ALPRAZolam (XANAX) tablet 0.25 mg  0.25 mg Oral QID PRN  
 loperamide (IMODIUM) capsule 2 mg  2 mg Oral Q4H PRN  
 insulin lispro (HUMALOG) injection   SubCUTAneous AC&HS  nitroglycerin (NITROBID) 2 % ointment 1 Inch  1 Inch Topical TID  busPIRone (BUSPAR) tablet 10 mg  10 mg Oral TID  oxyCODONE-acetaminophen (PERCOCET) 5-325 mg per tablet 1 Tab  1 Tab Per NG tube Q4H PRN  
 traMADol (ULTRAM) tablet 50 mg  50 mg Oral Q6H PRN  
 epoetin maritza-epbx (RETACRIT) 14,000 Units combo injection  14,000 Units SubCUTAneous Q7D  
 argatroban 50 mg in 0.9% sodium chloride 50 mL (1000 mcg/mL) infusion  0.5-10 mcg/kg/min IntraVENous TITRATE Diet: DIET NUTRITIONAL SUPPLEMENTS 
DIET CARDIAC Other Studies (last 24 hours): 
Ct Chest Wo Cont Result Date: 2/8/2020 CT THORAX WITHOUT CONTRAST HISTORY: persistent L pulmonary infiltrate, hypoxia of unclear etiology, 69 years Male Recent heart attack Hypoxic Poor historian COMPARISON: CTA chest November 20, 2019 TECHNIQUE: Noncontrast axial helical images from the thoracic inlet through diaphragm were obtained. Coronal reformatted images were obtained at the scanner console and made available for review. All CT scans at this location are performed using dose modulation techniques as appropriate to a performed exam including the following: automated exposure control; adjustment of the mA and/or kV according to patient's size (this includes techniques or standardized protocols for targeted exams where dose is matched to indication/reason for exam; i.e. extremities or head); use of iterative reconstruction technique. FINDINGS: Partially visualized thyroid unremarkable. No evidence of significant mediastinal, or axillary lymphadenopathy. Mild calcific atherosclerosis of a normal caliber aortic arch and descending aorta.   Cardiac pacing device leads appear to remain in anatomic position. Again visualized is a dilated esophagus containing layering fluid. New small left pleural effusion with increasing left lower lobe consolidation likely representing acute left lower lobar pneumonia. Persistent bilateral patchy groundglass opacities which may represent a chronic pneumonitis. Visualized proximal airways unremarkable. Calcified splenic granulomas, indicative of prior granulomatous disease. Small nonobstructing bilateral renal medullary level calculi unchanged and only partially visualized here. Visualized upper abdominal viscera including the adrenal glands are otherwise unremarkable. There appear to be multiple chronic healed fracture deformities of the right lower anterolateral ribs. Visualized osseous structures unremarkable. IMPRESSION: 1. Probable acute left lower lobar pneumonia, with an associated small left pleural effusion. 2.  Other chronic findings as above. Xr Chest [facility] Result Date: 2/8/2020 EXAM: XR CHEST PORT INDICATION: s/p cabg COMPARISON: 2/7/2020 FINDINGS: A portable AP radiograph of the chest was obtained at 0200 hours. The patient is on a cardiac monitor. Bilateral airspace disease left pleural effusion worse in the interval. The cardiac and mediastinal contours and pulmonary vascularity are normal.  The bones and soft tissues are grossly within normal limits. IMPRESSION: Pulmonary edema and left pleural effusion worse in the interval. 
 
 
Assessment and Plan:  
 
Hospital Problems as of 2/8/2020 Date Reviewed: 2/8/2020 Codes Class Noted - Resolved POA Pleural effusion ICD-10-CM: J90 ICD-9-CM: 511.9  2/3/2020 - Present Unknown HIT (heparin-induced thrombocytopenia) (HCC) ICD-10-CM: Q01.16 ICD-9-CM: 289.84  1/23/2020 - Present Unknown Bilateral pneumothorax ICD-10-CM: J93.9 ICD-9-CM: 512.89  1/17/2020 - Present Thrombocytopenia ([facility] [address] ICD-10-CM: D69.6 ICD-9-CM: 287.5  1/17/2020 - Present Shock ([facility] ICD-10-CM: R57.9 ICD-9-CM: 785.50  1/15/2020 - Present Unknown Atrial fibrillation [facility]) ICD-10-CM: I48.91 
ICD-9-CM: 427.31  1/13/2020 - Present Unknown Acute renal failure (ARF) ([facility]) ICD-10-CM: N17.9 ICD-9-CM: 584.9  1/11/2020 - Present Unknown Aortic valve stenosis ICD-10-CM: I35.0 ICD-9-CM: 424.1  1/10/2020 - Present Unknown CAD (coronary artery disease) ICD-10-CM: I25.10 ICD-9-CM: 414.00  1/10/2020 - Present Unknown Weaning from respirator [facility]) ICD-10-CM: Z99.11 ICD-9-CM: V46.13  1/10/2020 - Present Acute hypoxemic respiratory failure ([facility] ICD-10-CM: J96.01 
ICD-9-CM: 518.81  1/10/2020 - Present * (Principal) S/P CABG x 3 ICD-10-CM: Z95.1 ICD-9-CM: V45.81  1/10/2020 - Present Unknown S/P AVR ICD-10-CM: Z95.2 ICD-9-CM: V43.3  1/10/2020 - Present Unknown DM type 2 (diabetes mellitus, type 2) ([facility]) ICD-10-CM: E11.9 ICD-9-CM: 250.00  1/14/2013 - Present Yes RESOLVED: Metabolic acidosis QBZ-18-AE: E87.2 ICD-9-CM: 276.2  1/11/2020 - 1/16/2020 Unknown RESOLVED: Hyperkalemia ICD-10-CM: E87.5 ICD-9-CM: 276.7  1/11/2020 - 1/16/2020 Unknown A/P:   
--Postoperative depressioncontinue anxiolytic BuSpar, tolerating addition of Remeron nocturnallycontinue hopefully this will help sleep, appetite and eventually depression. Counseled patient. --Diabetes management Morning sugars at goal but progressive increase glucose daytime. Titrating a.m. Lantus and continue sliding scale for now. May need to add/increase fixed dose NovoLog 3 times daily. Signed: 
Stephane CONLEY

## 2020-02-08 NOTE — PROGRESS NOTES
Toes to both feet cleansed with HCG foam and NTG ointment applied per order. Toes dressed with xeroform gauze covered with 4x4's and secured with cling. Toes are dark purple to black and skin is degloving to first 4 toes on left foot and degloving noted to 2nd, 3rd, and 4th toe on the right. Right great toe has a fluid filled blister. Patient tolerated well. Patient refused to perform IS or to wear rooke boots education provided to patient importance of compliance for each to prevent skin breakdown and improve respiratory function. Patient verbalized understanding but continued to refuse. Patient is frequently requesting to use the bed pan but not having any result of stool he states he is passing flatus and is fearful of having an accident. offered laxative but patient declined. Will continue to monitor.

## 2020-02-08 NOTE — PROGRESS NOTES
Today's Date: 2/8/2020 Date of Admission: 1/10/2020 Chart Reviewed. Subjective:  
 
Pt feels depressed. Medications Reviewed. Objective:  
 
Vitals:  
 02/07/20 1834 02/07/20 1920 02/07/20 2247 02/08/20 0340 BP: 131/59  122/57 110/52 Pulse: 87  82 79 Resp: 20  18 20 Temp: 97.5 °F (36.4 °C)  98.1 °F (36.7 °C) 98.4 °F (36.9 °C) SpO2: 96% 94% 98% 98% Weight:      
Height:      
 
 
Intake and Output Current Shift: No intake/output data recorded. Last 3 Shifts: 02/06 0701 - 02/07 1900 In: 720 [P.O.:720] Out: 4250 [Urine:150; Drains:100] Physical Exam: 
General: Well Developed, Obese, No Acute Distress, Alert & Oriented x 3, Appropriate mood Neck: supple, no JVD Heart: S1S2 with RRR Lungs: Clear throughout auscultation bilaterally Abd: soft, nontender, nondistended, with good bowel sounds Ext: + edema bilaterally Sternal incision: clean, dry, and intact Skin: warm and dry LABS Data Review:  
Recent Labs 02/08/20 
0198 02/07/20 
4904 02/06/20 
8003  135* 137  
K 3.9 4.0 4.0 MG  --  2.4 2.3 BUN 36* 55* 35* CREA 5.07* 6.69* 5.06* * 155* 180* WBC 10.1  --  14.8* HGB 8.0*  --  8.3* HCT 26.1*  --  26.9*  
  --  176 INR 2.6 2.6 2.5 Estimated Creatinine Clearance: 16.9 mL/min (A) (based on SCr of 5.07 mg/dL (H)). Assessment/Plan:  
 
Principal Problem: 
  S/P CABG x 3 (1/10/2020) Transferred to [facility], on O2 by NC, wound vac placed back on left leg, on HD, platelets improving, on Argatroban and coumadin, will continue to overlap until INR >4 then check true INR Active Problems: 
  Aortic valve stenosis (1/10/2020) 
  S/p AVR 
  
  CAD (coronary artery disease) (1/10/2020) S/p CABG 
  
  Weaning from respirator [facility]) (1/10/2020)   
  
  Acute hypoxemic respiratory failure ([address] (1/10/2020) On O2 by NC 
  
  S/P AVR (1/10/2020)   
  
  Acute renal failure (ARF) ([address] (1/11/2020) On HD, nephrology following 
  
   Atrial fibrillation ([facility] (1/13/2020) Intermittent, continue oral amiodarone, no BB with low BP at times 
  
  Shock [facility]) (1/15/2020) 
resolved 
  
  Bilateral pneumothorax (1/17/2020) 
resolved 
  
  Thrombocytopenia ([facility] [address] (1/17/2020) 
  Due to HIT, improving  
  
  HIT (heparin-induced thrombocytopenia) ([facility] [address] (1/23/2020) 
  On argatroban/coumadin; transition off argatroban once INR > 4; dosing per pharmacy. 
  
  Pleural effusion (2/3/2020)   
  
  Depression Pt has been stating for several days that he is depressed, on Buspar for anxiety, will defer to hospitalist as far as adding medication to Buspar or changing to medication to treat both anxiety and depression Dispo 
 will need STR Drew Zimmerman MD

## 2020-02-08 NOTE — PROGRESS NOTES
Jim Gómez Admission Date: 1/10/2020 Daily Progress Note: 2/8/2020 The patient's chart is reviewed and the patient is discussed with the staff. CABG x 3 and AVR on 1/10.  Slow to improve. Had small ptx that resolved without intervention. He has developed DEJUAN on CKD - now on dialysis. 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 Post op depression. Subjective:  
 
Remains on 5L O2, states is short of breath at times. No productive cough. States \"I feel terrible\" and didn't sleep well last night. Checked yesterday for pleural effusions--small and no tap needed. Remains on Argatroban drip--HIT positive Review of Systems Constitutional: negative for fever, chills, sweats Cardiovascular: negative for chest pain, palpitations, syncope, edema Gastrointestinal: negative for dysphagia, reflux, vomiting, diarrhea, abdominal pain, or melena Neurologic: negative for focal weakness, numbness, headache Current Facility-Administered Medications Medication Dose Route Frequency  warfarin (COUMADIN) tablet 5 mg  5 mg Oral QPM  
 insulin glargine (LANTUS) injection 25 Units  25 Units SubCUTAneous DAILY  mirtazapine (REMERON) tablet 15 mg  15 mg Oral QHS  albuterol (PROVENTIL VENTOLIN) nebulizer solution 2.5 mg  2.5 mg Nebulization QID RT  
 insulin glargine (LANTUS) injection 18 Units  18 Units SubCUTAneous QHS  ALPRAZolam (XANAX) tablet 0.25 mg  0.25 mg Oral QID PRN  
 loperamide (IMODIUM) capsule 2 mg  2 mg Oral Q4H PRN  
 insulin lispro (HUMALOG) injection   SubCUTAneous AC&HS  nitroglycerin (NITROBID) 2 % ointment 1 Inch  1 Inch Topical TID  busPIRone (BUSPAR) tablet 10 mg  10 mg Oral TID  oxyCODONE-acetaminophen (PERCOCET) 5-325 mg per tablet 1 Tab  1 Tab Per NG tube Q4H PRN  
 traMADol (ULTRAM) tablet 50 mg  50 mg Oral Q6H PRN  
 epoetin maritza-epbx (RETACRIT) 14,000 Units combo injection  14,000 Units SubCUTAneous Q7D  
  argatroban 50 mg in 0.9% sodium chloride 50 mL (1000 mcg/mL) infusion  0.5-10 mcg/kg/min IntraVENous TITRATE Objective:  
 
Vitals:  
 02/07/20 1834 02/07/20 1920 02/07/20 2247 02/08/20 0340 BP: 131/59  122/57 110/52 Pulse: 87  82 79 Resp: 20 18 20 Temp: 97.5 °F (36.4 °C)  98.1 °F (36.7 °C) 98.4 °F (36.9 °C) SpO2: 96% 94% 98% 98% Weight:      
Height:      
 
Intake and Output:  
02/06 1901 - 02/08 0700 In: 0 Out: 3525 [Urine:150] No intake/output data recorded. Physical Exam:  
Constitutional:  the patient is obese and in no acute distress, NC 5L sat 98% HEENT:  Sclera clear, pupils equal, oral mucosa moist 
Lungs: clear anterior, diminished left base, no wheezing Cardiovascular:  RRR without M,G,R. Sinus per telemetry Abd/GI: soft and non-tender; with positive bowel sounds. Ext: warm without cyanosis. There is lower leg edema - left leg > right. Left leg dressing in place. Noted ischemic digits - hands and feet. Musculoskeletal: moves all four extremities with equal strength Skin:  no jaundice or rashes, left leg wounds Neuro: no gross neuro deficits Musculoskeletal:  No deformity Psychiatric: alert and oriented x2. CXR  
2/8/20:  Pulmonary edema and left pleural effusion worse in the interval. 
 
 
 
CHEST XRAY:  
 
LAB Recent Labs 02/08/20 
3777 02/07/20 
5002 02/06/20 
7789 WBC 10.1  --  14.8* HGB 8.0*  --  8.3* HCT 26.1*  --  26.9*  
  --  176 INR 2.6 2.6 2.5 Recent Labs 02/08/20 
1364 02/07/20 
9956 02/06/20 
3492  135* 137  
K 3.9 4.0 4.0  
 99 101 CO2 29 28 29 * 155* 180* BUN 36* 55* 35* CREA 5.07* 6.69* 5.06* MG  --  2.4 2.3 Assessment:  (Medical Decision Making) Hospital Problems  Date Reviewed: 2/8/2019 Codes Class Noted POA Pleural effusion ICD-10-CM: J90 ICD-9-CM: 511.9  2/3/2020 Unknown Checked with US yesterday--no need for tap HIT (heparin-induced thrombocytopenia) ([facility]) ICD-10-CM: Q51.77 ICD-9-CM: 289.84  1/23/2020 Unknown INR 2.6. remains on Argatroban and Coumadin Shock ([facility] [address] ICD-10-CM: R57.9 ICD-9-CM: 785.50  1/15/2020 Unknown  
 resolved Atrial fibrillation [facility]) ICD-10-CM: I48.91 
ICD-9-CM: 427.31  1/13/2020 Unknown Sinus per telemetry Acute renal failure (ARF) ([facility]) ICD-10-CM: N17.9 ICD-9-CM: 584.9  1/11/2020 Unknown On dialysis - no signs of recovery yet Aortic valve stenosis ICD-10-CM: I35.0 ICD-9-CM: 424.1  1/10/2020 Unknown S/p AVR  
 CAD (coronary artery disease) ICD-10-CM: I25.10 ICD-9-CM: 414.00  1/10/2020 Unknown S/p CABG Acute hypoxemic respiratory failure ([facility] [address] ICD-10-CM: J96.01 
ICD-9-CM: 518.81  1/10/2020 Remains on oxygen support - complains of dyspnea * (Principal) S/P CABG x 3 ICD-10-CM: Z95.1 ICD-9-CM: V45.81  1/10/2020 Unknown Per CV surgery S/P AVR ICD-10-CM: Z95.2 ICD-9-CM: V43.3  1/10/2020 Unknown Per CV surgery DM type 2 (diabetes mellitus, type 2) ([facility]) ICD-10-CM: E11.9 ICD-9-CM: 250.00  1/14/2013 Yes BS upper 112 to 251 Plan:  (Medical Decision Making) --Complains of dyspnea - U/S with no significant effusion, no need to tap 
--Nephrology following for dialysis, creat 5.07. No signs of recovery per their note 
--INR 2.6 - remains or argatroban and coumadin. Continue Argatroban until INR > 4 
--Albuterol 
--Depression-Buspar restarted 
--Continue PT 
--NTG ointment for digit ischemia More than 50% of time documented was spent face-to-face contact with the patient and in the care of the patient on the floor/unit where the patient is located. Sita Husbands, NP Lungs:  Decreased at left base Heart:  RRR with no Murmur/Rubs/Gallops Additional Comments:   
Patient with persistent severe hypoxia and LLL infiltrate. No obvious effusion on US. Leukocytosis recently but normal today.  Will obtain CT chest to further evaluate lung findings looking for any correctable pathology. Check pct in the am, if elevated will start abx. Collect sputum culture if he provides any. I have spoken with and examined the patient. I agree with the above assessment and plan as documented.  
 
Bria Sandhu MD

## 2020-02-08 NOTE — PROGRESS NOTES
GOALS: (revised goals 2/8/20) 1. Patient will transfer supine to sit with modified independence within 5 treatment days 2. Patient will transfer sit to stand with supervision within 5 treatment days 3. Patient will ambulate ' with the r/walker with supervision within 5 treatment days 4. Patient will participate in BLE AROM exercises in supine, sitting and standing to improve functional strength and mobility within 5 treatment days. PHYSICAL THERAPY: Daily Note, Re-evaluation and PM 2/8/2020 INPATIENT: PT Visit Days : 1 Payor: Erica Mcfadden / Plan: Alireza Roper / Product Type: [employer] /   
  
NAME/AGE/GENDER: Radha Hess is a 69 y.o. male PRIMARY DIAGNOSIS: Atherosclerosis of native coronary artery of native heart with unstable angina pectoris ([facility] [address] [I25.110] Nonrheumatic aortic valve stenosis [I35.0] CAD (coronary artery disease) [I25.10] Aortic valve stenosis [I35.0] S/P CABG x 3 S/P CABG x 3 Procedure(s) (LRB): ULTRASOUND on THinners (Bilateral) 5 Days Post-Op ICD-10: Treatment Diagnosis:  
 · Other abnormalities of gait and mobility (R26.89) Precaution/Allergies: 
Heparin; Amoxicillin; Keflex [cephalexin]; and Pcn [penicillins] ASSESSMENT:  
 
Mr. Stuart Acuña underwent above surgery on 1/10/20 and was only recently extubated. He lives with his wife and ambulates independently with cane or RW at baseline. Patient presents sitting up in the bedside chair stating his bottom is hurting. He is slightly frustrated that he cannot go back to bed yet stating he has been sitting up for almost 2 hours. Nursing came to the bedside to change his bedding to allow him to get back to the bed. PT worked on sit to stand 3 times allow time for him to be off his bottom but this did not ease his pain very much. He required CGA for sit to stand and stood using the r/walker for light UE support.   He is able to scoot in his chair and change his positions but his bottom continues to hurt with all activity. His BLE AROM is WFLs. Once his bed was ready, he stood with CGA and took ~ 8 steps from the chair to the bed using the r/walker with CGA. His standing balance was steady and he had a functional step pattern. He was seated on the EOB and was eager to return to supine with CGA to help his legs up on the bed. He positioned himself in side lying and was left with nursing at the bedside discussing the timing of getting his bath and getting cleaned up. Mr. Zaheer Salas appears to be making slow progress towards his goals with his more immediate issue being his bottom pain. PM note:  Patient states \"I can't breath\"  Saturation are 94-98% on O2. Patient participates in multiple transfers this pm.  He is doing much better moving his feet. Patient is left sitting in the recliner at the end of the treatment session. Wife and son are visiting and are updated on current status with therapy. Will continue PT efforts. This section established at most recent assessment PROBLEM LIST (Impairments causing functional limitations): 1. Decreased Strength 2. Decreased ADL/Functional Activities 3. Decreased Transfer Abilities 4. Decreased Ambulation Ability/Technique 5. Decreased Balance 6. Decreased Activity Tolerance 7. Increased Fatigue 8. Increased Shortness of Breath 9. Decreased Knowledge of Precautions INTERVENTIONS PLANNED: (Benefits and precautions of physical therapy have been discussed with the patient.) 1. Balance Exercise 2. Bed Mobility 3. Neuromuscular Re-education/Strengthening 4. Therapeutic Activites 5. Therapeutic Exercise/Strengthening 6. Transfer Training 7. education TREATMENT PLAN: Frequency/Duration: twice daily for duration of hospital stay Rehabilitation Potential For Stated Goals: Good REHAB RECOMMENDATIONS (at time of discharge pending progress):   
Placement: It is my opinion, based on this patient's performance to date, that Mr. Sofia Tenorio may benefit from intensive therapy at a [address] [place] [address] after discharge due to the functional deficits listed above that are likely to improve with skilled rehabilitation and severe debility. Equipment:  
? tbd  
? None at this time HISTORY:  
History of Present Injury/Illness (Reason for Referral): 
Patient admitted for above surgery. Past Medical History/Comorbidities:  
Mr. Sofia Tenorio  has a past medical history of Arthritis, BPH (benign prostatic hyperplasia) (1/14/2013), CAD (coronary artery disease), DM type 2 (diabetes mellitus, type 2) ([facility] [address] (dx 2004), Dyspnea, Gout (1/14/2013), HLD (hyperlipidemia) (1/14/2013), HTN (hypertension), Morbid obesity ([facility] [address] (9/3/14), Psychiatric disorder, Rheumatic fever, Seasonal allergic rhinitis, Severe aortic valve stenosis, Thyroid disease, and Unspecified sleep apnea (2016). Mr. Sofia Tenorio  has a past surgical history that includes hx tonsil and adenoidectomy; hx heart catheterization (12/23/2019); hx orthopaedic (Left); hx cataract removal (Bilateral, 2012); and ir insert tunl cvc w port over 5 years (2/4/2020). Social History/Living Environment:  
Home Environment: Private residence # Steps to Enter: 1 One/Two Story Residence: One story Living Alone: No 
Support Systems: Spouse/Significant Other/Partner Patient Expects to be Discharged to[de-identified] Private residence Current DME Used/Available at Home: Cane, straight, Shower chair Tub or Shower Type: Shower Prior Level of Function/Work/Activity: 
 
He lives with his wife and ambulates independently with cane or RW at baseline. Number of Personal Factors/Comorbidities that affect the Plan of Care: 3+: HIGH COMPLEXITY EXAMINATION:  
Most Recent Physical Functioning:  
Gross Assessment: 
  
         
  
Posture: 
  
Balance: 
  Bed Mobility: 
  
Wheelchair Mobility: 
  
Transfers: 
  
Gait: 
  
   
  
 Body Structures Involved: 1. Heart 2. Lungs 3. Muscles Body Functions Affected: 1. Cardio 2. Respiratory 3. Neuromusculoskeletal 
4. Movement Related Activities and Participation Affected: 1. Mobility 2. Self Care 3. Domestic Life 4. Community, Social and [place] Number of elements that affect the Plan of Care: 4+: HIGH COMPLEXITY CLINICAL PRESENTATION:  
Presentation: Evolving clinical presentation with changing clinical characteristics: MODERATE COMPLEXITY CLINICAL DECISION MAKIN[address] [place] [address] AM-PAC 6 Clicks Basic Mobility Inpatient Short Form How much difficulty does the patient currently have. .. Unable A Lot A Little None 1. Turning over in bed (including adjusting bedclothes, sheets and blankets)? [x] 1   [] 2   [] 3   [] 4  
2. Sitting down on and standing up from a chair with arms ( e.g., wheelchair, bedside commode, etc.)   [x] 1   [] 2   [] 3   [] 4  
3. Moving from lying on back to sitting on the side of the bed? [x] 1   [] 2   [] 3   [] 4 How much help from another person does the patient currently need. .. Total A Lot A Little None 4. Moving to and from a bed to a chair (including a wheelchair)? [x] 1   [] 2   [] 3   [] 4  
5. Need to walk in hospital room? [x] 1   [] 2   [] 3   [] 4  
6. Climbing 3-5 steps with a railing? [x] 1   [] 2   [] 3   [] 4  
© , Trustees of [address] [place], [place] [id number], under license to [employer] All rights reserved Score:  Initial: 6 Most Recent: X (Date: -- ) Interpretation of Tool:  Represents activities that are increasingly more difficult (i.e. Bed mobility, Transfers, Gait). Medical Necessity:    
· Patient is expected to demonstrate progress in  
· strength, range of motion, balance, coordination, functional technique, and activity tolerance ·  to  
· decrease assistance required with all functional mobility · . Reason for Services/Other Comments: · Patient continues to require skilled intervention due to · medical complications and patient unable to attend/participate in therapy as expected · . Use of outcome tool(s) and clinical judgement create a POC that gives a: Questionable prediction of patient's progress: MODERATE COMPLEXITY  
  
 
 
 
TREATMENT:  
(In addition to Assessment/Re-Assessment sessions the following treatments were rendered) Pre-treatment Symptoms/Complaints: \"I can't breath\" Pain: Initial: 0 FLACC Pain Intensity 1: 0 Pain Location 1: Buttocks  Post Session:  0/10 Therapeutic Activity: (    23 minutes): Therapeutic activities including bed mobility and transfers to improve mobility, strength, balance and coordination. Required minimum to moderate assist with Safety awareness training;Verbal cues to increase participation in functional mobility activities. Gait Training (  mins):  Gait training to improve and/or restore physical functioning as related to mobility, strength and balance. Ambulated 8 Feet (ft) with Contact guard assistance using a Walker, rolling and minimal Safety awareness training;Verbal cues related to their sit to stand and standing  to promote proper body alignment, promote proper body posture and promote proper body mechanics. DATE: 1/26/20 1/29/20 2/3/20 2/6/20 2/8/20 Straight leg raise     10 B Hip abduct/ adduct X5 AAB    10 B Heel slides  X3 AB  10 x 2 B Hip external/ internal rotation Ankle dorsiflexion/ plantarflexion X5 AB 25 10 x 2 B 25 10 B Sitting unsupported x5'       
LAQ  25  25 10 B   
marching  25  25 10 B Sit to stand     5 times Key:  A=active, AA=active assisted, P=passive, B=bilaterally, R=right, L=left Braces/Orthotics/Lines/Etc:  
· Wound vac · O2 Device: Heated, Hi flow nasal cannula 4L with O2 sats at 95-98% Treatment/Session Assessment:   
· Response to Treatment: Patient has transferred several times and is doing better with his participation · Interdisciplinary Collaboration:  
o Physical Therapist 
o Registered Nurse · After treatment position/precautions:  
o Supine in bed 
o Bed alarm/tab alert on 
o Bed/Chair-wheels locked 
o Call light within reach 
o Family at bedside · Compliance with Program/Exercises: Compliant all of the time · Recommendations/Intent for next treatment session: \"Next visit will focus on advancements to more challenging activities and reduction in assistance provided\". Total Treatment Duration: PT Patient Time In/Time Out Time In: 6329 Time Out: 1435 Kailey Morrissey, PTA

## 2020-02-08 NOTE — PROGRESS NOTES
Bedside and Verbal shift change report given to Rachael Luo RN (oncoming nurse) by self Randy benitez). Report included the following information SBAR, Kardex, Intake/Output, MAR, Recent Results and Cardiac Rhythm NSR.

## 2020-02-08 NOTE — PROGRESS NOTES
[facility] Progress Note Follow-Up on: DEJUAN/CKD ROS: 
Gen - no fever, no chills, appetite unchanged CV - no chest pain, no palpitation Lung - + shortness of breath, no cough Abd - no tenderness, no nausea/vomiting, no diarrhea Ext - + edema Exam: 
Vitals:  
 02/08/20 3064 02/08/20 3040 02/08/20 9753 02/08/20 8171 BP: 110/52 121/60 Pulse: 79 82 Resp: 20 18 Temp: 98.4 °F (36.9 °C) 97.3 °F (36.3 °C) SpO2: 98% 97%  94% Weight:   110.5 kg (243 lb 11.2 oz) Height:      
 
 
 
Intake/Output Summary (Last 24 hours) at 2/8/2020 0900 Last data filed at 2/8/2020 1997 Gross per 24 hour Intake 701.76 ml Output 4100 ml Net -3398.24 ml Wt Readings from Last 3 Encounters:  
02/08/20 110.5 kg (243 lb 11.2 oz) 01/08/20 136.6 kg (301 lb 4 oz) 12/23/19 138.8 kg (306 lb) GEN - in no distress CV - regular, no murmur, no rub Lung - basilar rales bilaterally Abd - soft, nontender Ext - trace edema Recent Labs 02/08/20 
9827 02/07/20 
0236 02/06/20 
6607 WBC 10.1  --  14.8* HGB 8.0*  --  8.3* HCT 26.1*  --  26.9*  
  --  176 INR 2.6 2.6 2.5 Recent Labs 02/08/20 
6783 02/07/20 
2323 02/06/20 
4915  135* 137  
K 3.9 4.0 4.0  
 99 101 CO2 29 28 29 BUN 36* 55* 35* CREA 5.07* 6.69* 5.06* CA 7.9* 8.0* 8.0*  
* 155* 180* MG  --  2.4 2.3 Assessment / Plan: 
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 
  DM type 2 (di[facility] mellitus, type 2) [facility] (1/14/2013) Aortic valve stenosis (1/10/2020) CAD (coronary artery disease) (1/10/2020) Acute hypoxemic respiratory failure ([address] (1/10/2020) S/P AVR (1/10/2020) Acute renal failure (ARF) ([address] (1/11/2020) Atrial fibrillation ([address] (1/13/2020) Shock ([address] (1/15/2020) HIT (heparin-induced thrombocytopenia) ([address] (1/23/2020) Pleural effusion (2/3/2020) 1. DEJUAN/CKD - Previous baseline Cr around 1.8-1.9 
- No signs of renal recovery 
- HD Monday, follow labs through the weekend 2. S/p CABG 3. Volume overload - overall better 4. HIT+ - confirmed with SHARON

## 2020-02-08 NOTE — PROGRESS NOTES
PT note:  Treatment deferred as the patient is currently off the floor at MRI. Will return as time permits.   Clifford Parekh PTA

## 2020-02-09 NOTE — PROGRESS NOTES
Bedside and Verbal shift change report given to Sharon Castaneda RN (oncoming nurse) by self Layman Mini nurse). Report included the following information SBAR, Kardex, Intake/Output, MAR, Recent Results and Cardiac Rhythm NSR.

## 2020-02-09 NOTE — PROGRESS NOTES
Priya Guevara Admission Date: 1/10/2020 Daily Progress Note: 2/9/2020 The patient's chart is reviewed and the patient is discussed with the staff. CABG x 3 and AVR on 1/10.  Slow to improve. Had small ptx that resolved without intervention. He has developed DEJUAN on CKD - now on dialysis. 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 Post op depression. Subjective: Weaned ton NC 3L O2, states is short of breath with activity and lying flat in the bed. No productive cough. Remains on Argatroban drip--HIT positive Review of Systems Constitutional: negative for fever, chills, sweats Cardiovascular: negative for chest pain, palpitations, syncope, edema Gastrointestinal: negative for dysphagia, reflux, vomiting, diarrhea, abdominal pain, or melena Neurologic: negative for focal weakness, numbness, headache Current Facility-Administered Medications Medication Dose Route Frequency  warfarin (COUMADIN) tablet 5 mg  5 mg Oral QPM  
 insulin glargine (LANTUS) injection 25 Units  25 Units SubCUTAneous DAILY  mirtazapine (REMERON) tablet 15 mg  15 mg Oral QHS  albuterol (PROVENTIL VENTOLIN) nebulizer solution 2.5 mg  2.5 mg Nebulization QID RT  
 insulin glargine (LANTUS) injection 18 Units  18 Units SubCUTAneous QHS  ALPRAZolam (XANAX) tablet 0.25 mg  0.25 mg Oral QID PRN  
 loperamide (IMODIUM) capsule 2 mg  2 mg Oral Q4H PRN  
 insulin lispro (HUMALOG) injection   SubCUTAneous AC&HS  nitroglycerin (NITROBID) 2 % ointment 1 Inch  1 Inch Topical TID  busPIRone (BUSPAR) tablet 10 mg  10 mg Oral TID  oxyCODONE-acetaminophen (PERCOCET) 5-325 mg per tablet 1 Tab  1 Tab Per NG tube Q4H PRN  
 traMADol (ULTRAM) tablet 50 mg  50 mg Oral Q6H PRN  
 epoetin maritza-epbx (RETACRIT) 14,000 Units combo injection  14,000 Units SubCUTAneous Q7D  
 argatroban 50 mg in 0.9% sodium chloride 50 mL (1000 mcg/mL) infusion 0.5-10 mcg/kg/min IntraVENous TITRATE Objective:  
 
Vitals:  
 02/08/20 1927 02/08/20 1934 02/08/20 2230 02/09/20 7486 BP: 128/57  122/80 118/78 Pulse: 82  78 73 Resp: 18  16 18 Temp: 97.3 °F (36.3 °C)  98.2 °F (36.8 °C) 97.8 °F (36.6 °C) SpO2: 96% 98% 94% 93% Weight:    262 lb 3.2 oz (118.9 kg) Height:      
 
Intake and Output:  
02/07 0701 - 02/08 1900 In: 855.4 [P.O.:450; I.V.:405.4] Out: 7767 [Urine:150] No intake/output data recorded. Physical Exam:  
Constitutional:  the patient is obese and in no acute distress, NC 3L sat 93% HEENT:  Sclera clear, pupils equal, oral mucosa moist 
Lungs: clear anterior, diminished left base, no wheezing Cardiovascular:  RRR without M,G,R. Sinus per telemetry Abd/GI: soft and non-tender; with positive bowel sounds. Ext: warm without cyanosis. There is lower leg edema - left leg > right. Left leg dressing in place. Noted ischemic digits - hands and feet. Musculoskeletal: moves all four extremities with equal strength Skin:  no jaundice or rashes, left leg wounds Neuro: no gross neuro deficits Psychiatric: alert and oriented x2. Chest CT 2/8/20:   
1. Probable acute left lower lobar pneumonia, with an associated small left pleural effusion. 2.  Other chronic findings as above. 
  
 
CXR  
2/8/20:  Pulmonary edema and left pleural effusion worse in the interval. 
 
 
 
CHEST XRAY:  
 
LAB Recent Labs 02/09/20 
3514 02/08/20 
3793 02/07/20 
0907 WBC 9.4 10.1  --   
HGB 8.2* 8.0*  --   
HCT 26.4* 26.1*  --   
 183  --   
INR 3.3 2.6 2.6 Recent Labs 02/09/20 
3476 02/08/20 
3854 02/07/20 
1486 * 136 135* K 4.0 3.9 4.0  
 100 99 CO2 28 29 28 * 105* 155* BUN 52* 36* 55* CREA 6.37* 5.07* 6.69* MG  --   --  2.4 Assessment:  (Medical Decision Making) Hospital Problems  Date Reviewed: 2/9/2019 Codes Class Noted POA  Pleural effusion ICD-10-CM: J90 
 ICD-9-CM: 511.9  2/3/2020 Unknown Recently checked with US--no need for tap HIT (heparin-induced thrombocytopenia) ([facility]) ICD-10-CM: N18.49 ICD-9-CM: 289.84  1/23/2020 Unknown INR 3.3. remains on Argatroban and Coumadin Shock ([facility] [address] ICD-10-CM: R57.9 ICD-9-CM: 785.50  1/15/2020 Unknown  
 resolved Atrial fibrillation [facility]) ICD-10-CM: I48.91 
ICD-9-CM: 427.31  1/13/2020 Unknown Sinus per telemetry Acute renal failure (ARF) ([facility]) ICD-10-CM: N17.9 ICD-9-CM: 584.9  1/11/2020 Unknown On dialysis - no signs of recovery yet Aortic valve stenosis ICD-10-CM: I35.0 ICD-9-CM: 424.1  1/10/2020 Unknown S/p AVR  
 CAD (coronary artery disease) ICD-10-CM: I25.10 ICD-9-CM: 414.00  1/10/2020 Unknown S/p CABG Acute hypoxemic respiratory failure ([facility] [address] ICD-10-CM: J96.01 
ICD-9-CM: 518.81  1/10/2020 Remains on oxygen support - complains of dyspnea * (Principal) S/P CABG x 3 ICD-10-CM: Z95.1 ICD-9-CM: V45.81  1/10/2020 Unknown Per CV surgery S/P AVR ICD-10-CM: Z95.2 ICD-9-CM: V43.3  1/10/2020 Unknown Per CV surgery DM type 2 (diabetes mellitus, type 2) ([facility]) ICD-10-CM: E11.9 ICD-9-CM: 250.00  1/14/2013 Yes BS upper 142 to 174 Plan:  (Medical Decision Making) --Nephrology following for dialysis, creat 6.37. No signs of recovery per their note 
--INR 3.3 - remains or argatroban and coumadin. Continue Argatroban until INR > 4 
--Albuterol 
--Depression-Buspar restarted 
--Continue PT 
--NTG ointment for digit ischemia 
--Respiratory culture:  Not obtained yet 
--Chest CT reviewed:  Probable left lower lobe pneumonia, WBC 9.4, afebrile More than 50% of time documented was spent face-to-face contact with the patient and in the care of the patient on the floor/unit where the patient is located. Aster Blum, NP Lungs:  CTA B, no w/r/r Heart:  RRR with no Murmur/Rubs/Gallops Additional Comments: Patient weaned to RA and satting 94%. Keep off O2 unless sat <90%. Do not increase O2 just based on dyspnea. Ongoing LE infiltrate, recent leukocytosis, Pct elevated. Start levaquin, repeat pct in 2-3 days. I have spoken with and examined the patient. I agree with the above assessment and plan as documented.  
 
Akil Brooks MD

## 2020-02-09 NOTE — PROGRESS NOTES
Hospitalist Progress Note Admit Date:  1/10/2020  4:59 AM  
Name:  More Garcia Age:  69 y.o. 
:  1945 MRN:  [id number] PCP:  Coni Forrester MD 
Treatment Team: Attending Provider: Adrian Rader MD; Consulting Provider: Ranjit Vogel MD; Consulting Provider: Jaclyn Dveries MD; Care Manager: Jennifer Ventura; Consulting Provider: Maggie Bundy MD; Utilization Review: Sunni Borjas RN; Utilization Review: Lorenzo Munguia RN; Consulting Provider: Deep Watts MD; Student Nurse: Pushpa Mitchell; Utilization Review: Ilir Dubon Primary Nurse: Judith Coates RN; Physical Therapy Assistant: Hung Powell PTA Subjective: HPI and or CC: Copied from prior notes HPI: 
78-year-old  male patient, status post CABG and status post AVR, acute hypoxic respiratory failure presently on Airvo, acute kidney injury presently on dialysis, history of A. 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 
  
 
2020 Follow-up regarding diabetes management and depression He admits that oral intake is improving but he is frustrated by frequent loose stools. Nursing described as mucoid with some semi-formed material.  Still not sleeping well. He did smile several times today but denies significant improvement in depression. He remains frustrated with continued hospitalization and medical problems including hemodialysis. Objective:  
 
Patient Vitals for the past 24 hrs: 
 Temp Pulse Resp BP SpO2  
20 1119     93 % 20 1100 97.6 °F (36.4 °C) 84 18 122/65 94 % 20 0725     92 % 20 0704 98 °F (36.7 °C) 80 19 125/60 100 % 20 0352 97.8 °F (36.6 °C) 73 18 118/78 93 % 02/08/20 2230 98.2 °F (36.8 °C) 78 16 122/80 94 % 02/08/20 1934     98 % 02/08/20 1927 97.3 °F (36.3 °C) 82 18 128/57 96 % 02/08/20 1539     94 % Oxygen Therapy O2 Sat (%): 93 % (02/09/20 1119) Pulse via Oximetry: 88 beats per minute (02/09/20 1119) O2 Device: Nasal cannula (02/09/20 1119) O2 Flow Rate (L/min): 3 l/min (02/09/20 1119) O2 Temperature: 87.8 °F (31 °C) (02/01/20 0659) FIO2 (%): 40 % (02/06/20 2108) Intake/Output Summary (Last 24 hours) at 2/9/2020 1521 Last data filed at 2/9/2020 6036 Gross per 24 hour Intake 713.68 ml Output 50 ml Net 663.68 ml REVIEW OF SYSTEMS: Comprehensive ROS performed and negative except as stated in HPI. Physical Examination: 
General:    Still with insomniaalert and oriented x3 he joked today Head:  Oral mucous membranes moist no facial asymmetry, no eyelid droop Eyes:  No nystagmus pupils are equal round and reactive CV:   Regular rhythm and rate no discernible gallop or obvious JVD and no pulsatile liver is obvious. Lungs:   Decreased breath sounds bilateral bases. No wheezing or rails obvious. Abdomen:   No localized tenderness. No palpable masses. Psychiatric denies suicidal thoughts or ideations. Sense of frustration regarding multiple medical problems. Data Review: 
I have reviewed all labs, meds, telemetry events, and studies from the last 24 hours. Recent Results (from the past 24 hour(s)) GLUCOSE, POC Collection Time: 02/08/20  4:32 PM  
Result Value Ref Range Glucose (POC) 154 (H) 65 - 100 mg/dL GLUCOSE, POC Collection Time: 02/08/20  9:23 PM  
Result Value Ref Range Glucose (POC) 174 (H) 65 - 100 mg/dL PROTHROMBIN TIME + INR Collection Time: 02/09/20  3:35 AM  
Result Value Ref Range Prothrombin time 34.4 (H) 12.0 - 14.7 sec INR 3.3 METABOLIC PANEL, BASIC Collection Time: 02/09/20  3:35 AM  
Result Value Ref Range  Sodium 135 (L) 136 - 145 mmol/L  
 Potassium 4.0 3.5 - 5.1 mmol/L Chloride 100 98 - 107 mmol/L  
 CO2 28 21 - 32 mmol/L Anion gap 7 7 - 16 mmol/L Glucose 140 (H) 65 - 100 mg/dL BUN 52 (H) 8 - 23 MG/DL Creatinine 6.37 (H) 0.8 - 1.5 MG/DL  
 GFR est AA 11 (L) >60 ml/min/1.73m2 GFR est non-AA 9 (L) >60 ml/min/1.73m2 Calcium 7.6 (L) 8.3 - 10.4 MG/DL  
CBC W/O DIFF Collection Time: 02/09/20  3:35 AM  
Result Value Ref Range WBC 9.4 4.3 - 11.1 K/uL  
 RBC 2.83 (L) 4.23 - 5.6 M/uL HGB 8.2 (L) 13.6 - 17.2 g/dL HCT 26.4 (L) 41.1 - 50.3 % MCV 93.3 79.6 - 97.8 FL  
 MCH 29.0 26.1 - 32.9 PG  
 MCHC 31.1 (L) 31.4 - 35.0 g/dL  
 RDW 14.1 11.9 - 14.6 % PLATELET 195 835 - 889 K/uL MPV 9.9 9.4 - 12.3 FL ABSOLUTE NRBC 0.00 0.0 - 0.2 K/uL PROCALCITONIN Collection Time: 02/09/20  3:35 AM  
Result Value Ref Range Procalcitonin 1.48 ng/mL PTT Collection Time: 02/09/20  3:35 AM  
Result Value Ref Range aPTT 63.2 (H) 24.3 - 35.4 SEC GLUCOSE, POC Collection Time: 02/09/20  6:46 AM  
Result Value Ref Range Glucose (POC) 143 (H) 65 - 100 mg/dL GLUCOSE, POC Collection Time: 02/09/20 11:03 AM  
Result Value Ref Range Glucose (POC) 312 (H) 65 - 100 mg/dL All Micro Results Procedure Component Value Units Date/Time CULTURE, BLOOD [[id number]] Collected:  01/14/20 1005 Order Status:  Completed Specimen:  Blood Updated:  01/19/20 0701 Special Requests: --     
  RIGHT 
ARTL (ART LINE) Culture result: NO GROWTH 5 DAYS     
 CULTURE, BLOOD [[id number]] Collected:  01/14/20 1145 Order Status:  Completed Specimen:  Blood Updated:  01/19/20 0701 Special Requests: --     
  LEFT 
HAND Culture result: NO GROWTH 5 DAYS     
 CULTURE, BLOOD [[id number]] Collected:  01/12/20 8679 Order Status:  Completed Specimen:  Blood Updated:  01/17/20 0740 Special Requests: --     
  RIGHT Antecubital 
  
  Culture result: NO GROWTH 5 DAYS CULTURE, BLOOD [[id number]] Collected:  01/12/20 4846 Order Status:  Completed Specimen:  Blood Updated:  01/17/20 0740 Special Requests: --     
  LEFT 
HAND Culture result: NO GROWTH 5 DAYS     
 CULTURE, URINE [[id number]] Collected:  01/14/20 1950 Order Status:  Completed Specimen:  Urine from [place] Specimen Updated:  01/17/20 2278 Special Requests: NO SPECIAL REQUESTS Culture result: NO GROWTH 2 DAYS     
 CULTURE, RESPIRATORY/SPUTUM/BRONCH Delisa Marvin [[id number]] Collected:  01/14/20 1132 Order Status:  Completed Specimen:  Sputum,ET Suction Updated:  01/16/20 0541 Special Requests: NO SPECIAL REQUESTS     
  GRAM STAIN 15 TO 20 WBCS SEEN PER OIF  
   0 TO 1 EPITHELIAL CELLS SEEN PER OIF  
   1+ MUCUS PRESENT     
   FEW GRAM POSITIVE RODS     
   FEW GRAM POSITIVE COCCI Culture result:    
  LIGHT NORMAL RESPIRATORY BRENNAN  
     
 CULTURE, RESPIRATORY/SPUTUM/BRONCH W Tony Amos [[id number]] Collected:  01/12/20 2911 Order Status:  Completed Specimen:  Sputum from Endotracheal aspirate Updated:  01/14/20 6940 Special Requests: NO SPECIAL REQUESTS     
  GRAM STAIN 15 TO 20 WBCS SEEN PER OIF  
   0 TO 1 EPITHELIAL CELLS SEEN PER OIF  
   2+ MUCUS PRESENT     
   RARE GRAM POSITIVE COCCI Culture result:    
  LIGHT NORMAL RESPIRATORY BRENNAN  
     
 CULTURE, URINE [[id number]] Collected:  01/12/20 0901 Order Status:  Completed Specimen:  Urine from [place] Specimen Updated:  01/14/20 0725 Special Requests: NO SPECIAL REQUESTS Culture result: NO GROWTH 2 DAYS Current Meds: 
Current Facility-Administered Medications Medication Dose Route Frequency  [START ON 2/10/2020] insulin glargine (LANTUS) injection 28 Units  28 Units SubCUTAneous DAILY  levoFLOXacin (LEVAQUIN) 750 mg in D5W IVPB  750 mg IntraVENous Q48H  
 insulin lispro (HUMALOG) injection 5 Units  5 Units SubCUTAneous TIDAC  
  warfarin (COUMADIN) tablet 5 mg  5 mg Oral QPM  
 mirtazapine (REMERON) tablet 15 mg  15 mg Oral QHS  albuterol (PROVENTIL VENTOLIN) nebulizer solution 2.5 mg  2.5 mg Nebulization QID RT  
 insulin glargine (LANTUS) injection 18 Units  18 Units SubCUTAneous QHS  ALPRAZolam (XANAX) tablet 0.25 mg  0.25 mg Oral QID PRN  
 loperamide (IMODIUM) capsule 2 mg  2 mg Oral Q4H PRN  
 insulin lispro (HUMALOG) injection   SubCUTAneous AC&HS  nitroglycerin (NITROBID) 2 % ointment 1 Inch  1 Inch Topical TID  busPIRone (BUSPAR) tablet 10 mg  10 mg Oral TID  oxyCODONE-acetaminophen (PERCOCET) 5-325 mg per tablet 1 Tab  1 Tab Per NG tube Q4H PRN  
 traMADol (ULTRAM) tablet 50 mg  50 mg Oral Q6H PRN  
 epoetin maritza-epbx (RETACRIT) 14,000 Units combo injection  14,000 Units SubCUTAneous Q7D  
 argatroban 50 mg in 0.9% sodium chloride 50 mL (1000 mcg/mL) infusion  0.5-10 mcg/kg/min IntraVENous TITRATE Diet: DIET NUTRITIONAL SUPPLEMENTS 
DIET CARDIAC Other Studies (last 24 hours): No results found. Assessment and Plan:  
 
Hospital Problems as of 2/9/2020 Date Reviewed: 2/9/2020 Codes Class Noted - Resolved POA Pleural effusion ICD-10-CM: J90 ICD-9-CM: 511.9  2/3/2020 - Present Unknown HIT (heparin-induced thrombocytopenia) (HCC) ICD-10-CM: C46.40 ICD-9-CM: 289.84  1/23/2020 - Present Unknown Bilateral pneumothorax ICD-10-CM: J93.9 ICD-9-CM: 512.89  1/17/2020 - Present Thrombocytopenia ([facility] [address] ICD-10-CM: D69.6 ICD-9-CM: 287.5  1/17/2020 - Present Shock ([facility] [address] ICD-10-CM: R57.9 ICD-9-CM: 785.50  1/15/2020 - Present Unknown Atrial fibrillation [facility]) ICD-10-CM: I48.91 
ICD-9-CM: 427.31  1/13/2020 - Present Unknown Acute renal failure (ARF) (HCC) ICD-10-CM: N17.9 ICD-9-CM: 584.9  1/11/2020 - Present Unknown Aortic valve stenosis ICD-10-CM: I35.0 ICD-9-CM: 424.1  1/10/2020 - Present Unknown CAD (coronary artery disease) ICD-10-CM: I25.10 ICD-9-CM: 414.00  1/10/2020 - Present Unknown Weaning from respirator [facility]) ICD-10-CM: Z99.11 ICD-9-CM: V46.13  1/10/2020 - Present Acute hypoxemic respiratory failure ([address] ICD-10-CM: J96.01 
ICD-9-CM: 518.81  1/10/2020 - Present * (Principal) S/P CABG x 3 ICD-10-CM: Z95.1 ICD-9-CM: V45.81  1/10/2020 - Present Unknown S/P AVR ICD-10-CM: Z95.2 ICD-9-CM: V43.3  1/10/2020 - Present Unknown DM type 2 (diabetes mellitus, type 2) ([facility]) ICD-10-CM: E11.9 ICD-9-CM: 250.00  1/14/2013 - Present Yes RESOLVED: Metabolic acidosis XNU-58-CW: E87.2 ICD-9-CM: 276.2  1/11/2020 - 1/16/2020 Unknown RESOLVED: Hyperkalemia ICD-10-CM: E87.5 ICD-9-CM: 276.7  1/11/2020 - 1/16/2020 Unknown A/P:   
--Postoperative depressioncontinue anxiolytic BuSpar, tolerating addition of antidepressant Remeron nocturnally monitor clinicallyno suicidal thoughts or ideations. --Loose frequent stools Nursing report mucoid and semi-formed. Apparently was having loose stools prior to Remeron therapytypically not associated with diarrhea but any antidepressant may be 
Nursing feel patient has high fecal constipation. Could try frequent MiraLAX dosing hourly until clearing in lieu of planned attempt for enema but if he does not fact have a high fecal impaction may need tap water enema or other 
 
--Diabetes management Morning sugars at goal but progressive increase glucose daytime--this appears to be recurring again today. Will add 5 units fixed dose Lantus with each meal and monitor. Difficulty managing sugars in part based on sporadic oral intake hopefully improving. Signed: 
Stephane CONLEY

## 2020-02-09 NOTE — PROGRESS NOTES
[facility] Progress Note Follow-Up on: DEJUAN/CKD ROS: 
Gen - no fever, no chills, appetite unchanged CV - no chest pain, no palpitation Lung - + shortness of breath, no cough Abd - no tenderness, no nausea/vomiting, no diarrhea Ext - + edema Exam: 
Vitals:  
 02/08/20 2230 02/09/20 0721 02/09/20 4444 02/09/20 0725 BP: 122/80 118/78 125/60 Pulse: 78 73 80 Resp: 16 18 19 Temp: 98.2 °F (36.8 °C) 97.8 °F (36.6 °C) 98 °F (36.7 °C) SpO2: 94% 93% 100% 92% Weight:  118.9 kg (262 lb 3.2 oz) Height:      
 
 
 
Intake/Output Summary (Last 24 hours) at 2/9/2020 5556 Last data filed at 2/9/2020 0930 Gross per 24 hour Intake 713.68 ml Output 50 ml Net 663.68 ml Wt Readings from Last 3 Encounters:  
02/09/20 118.9 kg (262 lb 3.2 oz) 01/08/20 136.6 kg (301 lb 4 oz) 12/23/19 138.8 kg (306 lb) GEN - in no distress CV - regular, no murmur, no rub Lung - basilar rales bilaterally Abd - soft, nontender Ext - trace edema Recent Labs 02/09/20 
5347 02/08/20 
8265 02/07/20 
8913 WBC 9.4 10.1  --   
HGB 8.2* 8.0*  --   
HCT 26.4* 26.1*  --   
 183  --   
INR 3.3 2.6 2.6 Recent Labs 02/09/20 
1213 02/08/20 
8109 02/07/20 
6388 * 136 135* K 4.0 3.9 4.0  
 100 99 CO2 28 29 28 BUN 52* 36* 55* CREA 6.37* 5.07* 6.69* CA 7.6* 7.9* 8.0*  
* 105* 155* MG  --   --  2.4 Assessment / Plan: 
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 
  DM type 2 (diabetes mellitus, type 2) ([address] (1/14/2013) Aortic valve stenosis (1/10/2020) CAD (coronary artery disease) (1/10/2020) Acute hypoxemic respiratory failure ([address] (1/10/2020) S/P AVR (1/10/2020) Acute renal failure (ARF) ([address] (1/11/2020) Atrial fibrillation ([address] (1/13/2020) Shock ([address] (1/15/2020) HIT (heparin-induced thrombocytopenia) ([address] (1/23/2020) Pleural effusion (2/3/2020) 1. DEJUAN/CKD - Previous baseline Cr around 1.8-1.9 
- No signs of renal recovery 
- HD Monday 2. S/p CABG 3. Volume overload - overall better 4. HIT+ - confirmed with SHARON

## 2020-02-09 NOTE — PROGRESS NOTES
Today's Date: 2/9/2020 Date of Admission: 1/10/2020 Chart Reviewed. Subjective:  
 
Pt feels depressed. Medications Reviewed. Objective:  
 
Vitals:  
 02/08/20 1927 02/08/20 1934 02/08/20 2230 02/09/20 8350 BP: 128/57  122/80 118/78 Pulse: 82  78 73 Resp: 18  16 18 Temp: 97.3 °F (36.3 °C)  98.2 °F (36.8 °C) 97.8 °F (36.6 °C) SpO2: 96% 98% 94% 93% Weight:    118.9 kg (262 lb 3.2 oz) Height:      
 
 
Intake and Output Current Shift: No intake/output data recorded. Last 3 Shifts: 02/07 0701 - 02/08 1900 In: 855.4 [P.O.:450; I.V.:405.4] Out: 4344 [Urine:150] Physical Exam: 
General: Well Developed, Obese, No Acute Distress, Alert & Oriented x 3, Appropriate mood Neck: supple, no JVD Heart: S1S2 with RRR Lungs: Clear throughout auscultation bilaterally Abd: soft, nontender, nondistended, with good bowel sounds Ext: + edema bilaterally Sternal incision: clean, dry, and intact Skin: warm and dry LABS Data Review:  
Recent Labs 02/09/20 
6530 02/08/20 
1717 02/07/20 
6960 * 136 135* K 4.0 3.9 4.0 MG  --   --  2.4 BUN 52* 36* 55* CREA 6.37* 5.07* 6.69* * 105* 155* WBC 9.4 10.1  --   
HGB 8.2* 8.0*  --   
HCT 26.4* 26.1*  --   
 183  --   
INR 3.3 2.6 2.6 Estimated Creatinine Clearance: 13.4 mL/min (A) (based on SCr of 6.37 mg/dL (H)). Assessment/Plan:  
 
Principal Problem: 
  S/P CABG x 3 (1/10/2020) [facility] by [facility], wound vac placed back on left leg, on HD, platelets improving, on Argatroban and coumadin, will continue to overlap until INR >4 then check true INR Active Problems: 
  Aortic valve stenosis (1/10/2020) 
  S/p AVR 
  
  CAD (coronary artery disease) (1/10/2020) S/p CABG 
  
  Weaning from respirator [facility]) (1/10/2020)   
  
  Acute hypoxemic respiratory failure ([address] (1/10/2020) On O2 by NC 
  
  S/P AVR (1/10/2020)   
  
  Acute renal failure (ARF) ([address] (1/11/2020) On HD, nephrology following 
  
  Atrial fibrillation ([facility] [address] (1/13/2020) Intermittent, continue oral amiodarone, no BB with low BP at times 
  
  Shock [facility]) (1/15/2020) 
resolved 
  
  Bilateral pneumothorax (1/17/2020) 
resolved 
  
  Thrombocytopenia ([facility] [address] (1/17/2020) 
  Due to HIT, improving  
  
  HIT (heparin-induced thrombocytopenia) ([facility] [address] (1/23/2020) 
  On argatroban/coumadin; transition off argatroban once INR > 4; dosing per pharmacy. 
  
  Pleural effusion (2/3/2020)   
  
  Depression Pt has been stating for several days that he is depressed, on Buspar for anxiety, will defer to hospitalist as far as adding medication to Buspar or changing to medication to treat both anxiety and depression Dispo 
 will need STR Socorro Jackson MD

## 2020-02-09 NOTE — PROGRESS NOTES
Wound vac tubing pulled off dressing while assisting patient to bedside commode. Damaged dressing removed and incisions cleaned with CHG. Incisions are will approximated with no redness or heat noted. Minimal drainage noted. Gauze dressing applied.

## 2020-02-09 NOTE — PROGRESS NOTES
Warfarin dosing per pharmacist 
 
Rita Ambrosio is a 69 y.o. male. Height: 5' 10\" (177.8 cm)    Weight: 118.9 kg (262 lb 3.2 oz) Indication:  AVR + Afib Goal INR:  2.5-3.5 Home dose:  New start Risk factors or significant drug interactions:  amiodarone Other anticoagulants:  Argatroban Daily Monitoring Date  INR     Warfarin dose HGB              Notes 2/4  1.4  2.5 mg  7.8   
2/5  2.3  2.5 mg  8.3 
2/6  2.5  2.5 mg  8.3 
2/7  2.6  5 mg   --- 
2/8  2.6  5 mg  8 
2/9  3.3  5 mg  8.2 Pharmacy has been consulted to manage Mr. Edwige Lynn warfarin during this admission. Started on 2.5mg daily on 2/4 due to age, amiodarone interaction and concurrent argatroban. Will give 5 mg one more night - hopefully above 4 tomorrow. Pharmacy will continue to follow patient and monitor INR daily. Reminders: ? Argatroban falsely elevates the INR (typically INR is roughly double the true INR while on argatroban). ? If using argatroban to bridge to warfarin, the drip must overlap with warfarin therapy for at least 5 days, regardless of the INR value, to prevent recurrent thrombosis, skin necrosis or venous gangrene. ? When the INR while on argatroban and warfarin together reaches ? 4.0, stop the argatroban drip for 4-6 hours and check the true INR. ? If the true INR is > 2.0, the argatroban drip may be stopped and the patient can continue with just warfarin. (If have overlapped ? 5 days.) ? If the true INR is < 2.0, the argatroban drip should be resumed at the previous rate and will continue until true INR is > 2.0 Thank you, Rosaura Miranda, PharmD Clinical Pharmacist 
[phone]

## 2020-02-09 NOTE — PROGRESS NOTES
GOALS: (revised goals 2/8/20) 1. Patient will transfer supine to sit with modified independence within 5 treatment days 2. Patient will transfer sit to stand with supervision within 5 treatment days 3. Patient will ambulate ' with the r/walker with supervision within 5 treatment days 4. Patient will participate in BLE AROM exercises in supine, sitting and standing to improve functional strength and mobility within 5 treatment days. PHYSICAL THERAPY: Daily Note and AM 2/9/2020 INPATIENT: PT Visit Days : 2 Payor: Anabel Jackson / Plan: Elton Killer / Product Type: [employer] Medicare /   
  
NAME/AGE/GENDER: Vane Mak is a 69 y.o. male PRIMARY DIAGNOSIS: Atherosclerosis of native coronary artery of native heart with unstable angina pectoris ([address] [I25.110] Nonrheumatic aortic valve stenosis [I35.0] CAD (coronary artery disease) [I25.10] Aortic valve stenosis [I35.0] S/P CABG x 3 S/P CABG x 3 Procedure(s) (LRB): ULTRASOUND on THinners (Bilateral) 6 Days Post-Op ICD-10: Treatment Diagnosis:  
 · Other abnormalities of gait and mobility (R26.89) Precaution/Allergies: 
Heparin; Amoxicillin; Keflex [cephalexin]; and Pcn [penicillins] ASSESSMENT:  
 
Mr. Ranjana Casanova underwent above surgery on 1/10/20 and was only recently extubated. He lives with his wife and ambulates independently with cane or RW at baseline. Patient presents sitting up in the bedside chair agreeable to therapy. RN is bedside. Sit to stand with min assist to the walker. Patient was able to take about 10 steps with the walker. Patient fatigues quickly and is seated in the recliner. Patient stood at the walker to be cleaned and then took a few steps to the bed. His bed mobility is getting better, however the patient still requires a lot of assist to get up in the bed. Patient is doing much better with his rolling in the bed.   Mr. Ranjana Casanova appears to be making slow progress towards his goals. Needs motivation and cues for safety. Will need rehab at discharge. Continue PT efforts. This section established at most recent assessment PROBLEM LIST (Impairments causing functional limitations): 1. Decreased Strength 2. Decreased ADL/Functional Activities 3. Decreased Transfer Abilities 4. Decreased Ambulation Ability/Technique 5. Decreased Balance 6. Decreased Activity Tolerance 7. Increased Fatigue 8. Increased Shortness of Breath 9. Decreased Knowledge of Precautions INTERVENTIONS PLANNED: (Benefits and precautions of physical therapy have been discussed with the patient.) 1. Balance Exercise 2. Bed Mobility 3. Neuromuscular Re-education/Strengthening 4. Therapeutic Activites 5. Therapeutic Exercise/Strengthening 6. Transfer Training 7. education TREATMENT PLAN: Frequency/Duration: twice daily for duration of hospital stay Rehabilitation Potential For Stated Goals: Good REHAB RECOMMENDATIONS (at time of discharge pending progress):   
Placement: It is my opinion, based on this patient's performance to date, that Mr. Hebert Miller may benefit from intensive therapy at a [address] [place] [address] after discharge due to the functional deficits listed above that are likely to improve with skilled rehabilitation and severe debility. Equipment:  
? tbd  
? None at this time HISTORY:  
History of Present Injury/Illness (Reason for Referral): 
Patient admitted for above surgery.  
Past Medical History/Comorbidities:  
Mr. Hebert Miller  has a past medical history of Arthritis, BPH (benign prostatic hyperplasia) (1/14/2013), CAD (coronary artery disease), DM type 2 (diabetes mellitus, type 2) ([facility] [address] (dx 2004), Dyspnea, Gout (1/14/2013), HLD (hyperlipidemia) (1/14/2013), HTN (hypertension), Morbid obesity ([facility] [address] (9/3/14), Psychiatric disorder, Rheumatic fever, Seasonal allergic rhinitis, Severe aortic valve stenosis, Thyroid disease, and Unspecified sleep apnea (2016). Mr. Chau Hays  has a past surgical history that includes hx tonsil and adenoidectomy; hx heart catheterization (12/23/2019); hx orthopaedic (Left); hx cataract removal (Bilateral, 2012); and ir insert tunl cvc w port over 5 years (2/4/2020). Social History/Living Environment:  
Home Environment: Private residence # Steps to Enter: 1 One/Two Story Residence: One story Living Alone: No 
Support Systems: Spouse/Significant Other/Partner Patient Expects to be Discharged to[de-identified] Private residence Current DME Used/Available at Home: Cane, straight, Shower chair Tub or Shower Type: Shower Prior Level of Function/Work/Activity: 
 
He lives with his wife and ambulates independently with cane or RW at baseline. Number of Personal Factors/Comorbidities that affect the Plan of Care: 3+: HIGH COMPLEXITY EXAMINATION:  
Most Recent Physical Functioning:  
Gross Assessment: 
  
         
  
Posture: 
  
Balance: 
Sitting: Intact Sitting - Static: Good (unsupported) Sitting - Dynamic: Good (unsupported) Standing: Impaired Standing - Static: Good Standing - Dynamic : Fair Bed Mobility: 
Rolling: Modified independent Supine to Sit: Contact guard assistance;Minimum assistance Sit to Supine: Contact guard assistance Scooting: Stand-by assistance Wheelchair Mobility: 
  
Transfers: 
Sit to Stand: Contact guard assistance Stand to Sit: Contact guard assistance Stand Pivot Transfers: Contact guard assistance Gait: 
  
Base of Support: Narrowed Speed/Federica: Shuffled; Slow Step Length: Left shortened;Right shortened Distance (ft): 10 Feet (ft) Assistive Device: Walker, rolling Ambulation - Level of Assistance: Contact guard assistance Body Structures Involved: 1. Heart 2. Lungs 3. Muscles Body Functions Affected: 1. Cardio 2. Respiratory 3.  Neuromusculoskeletal 
 4. Movement Related Activities and Participation Affected: 1. Mobility 2. Self Care 3. Domestic Life 4. Community, Social and [place] Number of elements that affect the Plan of Care: 4+: HIGH COMPLEXITY CLINICAL PRESENTATION:  
Presentation: Evolving clinical presentation with changing clinical characteristics: MODERATE COMPLEXITY CLINICAL DECISION MAKING:  
[facility] AM-PAC 6 Clicks Basic Mobility Inpatient Short Form How much difficulty does the patient currently have. .. Unable A Lot A Little None 1. Turning over in bed (including adjusting bedclothes, sheets and blankets)? [x] 1   [] 2   [] 3   [] 4  
2. Sitting down on and standing up from a chair with arms ( e.g., wheelchair, bedside commode, etc.)   [x] 1   [] 2   [] 3   [] 4  
3. Moving from lying on back to sitting on the side of the bed? [x] 1   [] 2   [] 3   [] 4 How much help from another person does the patient currently need. .. Total A Lot A Little None 4. Moving to and from a bed to a chair (including a wheelchair)? [x] 1   [] 2   [] 3   [] 4  
5. Need to walk in hospital room? [x] 1   [] 2   [] 3   [] 4  
6. Climbing 3-5 steps with a railing? [x] 1   [] 2   [] 3   [] 4  
© 2007, Trustees of [facility], under license to [facility]. All rights reserved Score:  Initial: 6 Most Recent: X (Date: -- ) Interpretation of Tool:  Represents activities that are increasingly more difficult (i.e. Bed mobility, Transfers, Gait). Medical Necessity:    
· Patient is expected to demonstrate progress in  
· strength, range of motion, balance, coordination, functional technique, and activity tolerance ·  to  
· decrease assistance required with all functional mobility · . Reason for Services/Other Comments: 
· Patient continues to require skilled intervention due to · medical complications and patient unable to attend/participate in therapy as expected · . Use of outcome tool(s) and clinical judgement create a POC that gives a: Questionable prediction of patient's progress: MODERATE COMPLEXITY  
  
 
 
 
TREATMENT:  
(In addition to Assessment/Re-Assessment sessions the following treatments were rendered) Pre-treatment Symptoms/Complaints: \"I will try to walk to the door\" Pain: Initial: 0 FLACC Pain Intensity 1: 0 Pain Location 1: Buttocks  Post Session:  0/10 Therapeutic Activity: (    23 minutes): Therapeutic activities including transfers, balance activities, bed mobility and gait training to improve mobility, strength, balance and coordination. Required minimum to moderate assist    to safey with functional mobility activities . Gait Training (  mins):  Gait training to improve and/or restore physical functioning as related to mobility, strength and balance. Ambulated 10 Feet (ft) with Contact guard assistance using a Walker, rolling and minimal   related to their sit to stand and standing  to promote proper body alignment, promote proper body posture and promote proper body mechanics. DATE: 1/26/20 1/29/20 2/3/20 2/6/20 2/8/20 Straight leg raise     10 B Hip abduct/ adduct X5 AAB    10 B Heel slides  X3 AB  10 x 2 B Hip external/ internal rotation Ankle dorsiflexion/ plantarflexion X5 AB 25 10 x 2 B 25 10 B Sitting unsupported x5'       
LAQ  25  25 10 B   
marching  25  25 10 B Sit to stand     5 times Key:  A=active, AA=active assisted, P=passive, B=bilaterally, R=right, L=left Braces/Orthotics/Lines/Etc:  
· Wound vac · O2 Device: Heated, Hi flow nasal cannula 4L with O2 sats at 97% Treatment/Session Assessment:   
· Response to Treatment:. 
· Interdisciplinary Collaboration:  
o Physical Therapy Assistant 
o Registered Nurse · After treatment position/precautions:  
o Supine in bed 
o Bed alarm/tab alert on 
o Bed/Chair-wheels locked 
o Call light within reach 
o Nurse at bedside · Compliance with Program/Exercises: Compliant all of the time · Recommendations/Intent for next treatment session: \"Next visit will focus on advancements to more challenging activities and reduction in assistance provided\". Total Treatment Duration: PT Patient Time In/Time Out Time In: 4911 Time Out: 7182 Daksha Miller, RICK

## 2020-02-09 NOTE — PROGRESS NOTES
PT note:  Treatment deferred this pm as the patient refused to get up with therapy this afternoon with a long list of reasons why he can't get up. RN made aware.   Maribell Castillo, PTA

## 2020-02-10 NOTE — DIALYSIS
TRANSFER OUT- DIALYSIS Hemodialysis treatment completed without complications. Patient alert and VS stable  /72  P 84   
 
 1.5 Kgs removed. Flushed both ports with 10 mL of NS.  CVC dressing clean, dry, and intact, tego caps intact, bilateral lumens wrapped with 4x4 gauze. Patient to room 2232 after dialysis.

## 2020-02-10 NOTE — PROGRESS NOTES
GOALS: (revised goals 2/8/20) 1. Patient will transfer supine to sit with modified independence within 5 treatment days 2. Patient will transfer sit to stand with supervision within 5 treatment days 3. Patient will ambulate ' with the r/walker with supervision within 5 treatment days 4. Patient will participate in BLE AROM exercises in supine, sitting and standing to improve functional strength and mobility within 5 treatment days. PHYSICAL THERAPY: Daily Note and AM 2/10/2020 INPATIENT: PT Visit Days : 3 Payor: Brandy Workman / Plan: Noah Gomes / Product Type: [employer] Medicare /   
  
NAME/AGE/GENDER: Rosaura Macario is a 69 y.o. male PRIMARY DIAGNOSIS: Atherosclerosis of native coronary artery of native heart with unstable angina pectoris ([facility] [address] [I25.110] Nonrheumatic aortic valve stenosis [I35.0] CAD (coronary artery disease) [I25.10] Aortic valve stenosis [I35.0] S/P CABG x 3 S/P CABG x 3 Procedure(s) (LRB): ULTRASOUND on THinners (Bilateral) 7 Days Post-Op ICD-10: Treatment Diagnosis:  
 · Other abnormalities of gait and mobility (R26.89) Precaution/Allergies: 
Heparin; Amoxicillin; Keflex [cephalexin]; and Pcn [penicillins] ASSESSMENT:  
 
Mr. Shawna Tijerina underwent above surgery on 1/10/20 and was only recently extubated. He lives with his wife and ambulates independently with cane or RW at baseline. Patient presents supine in the bed and states that he needs to use the bathroom. Bed mobility is with modified independence with additional time for task completion. Sit to stand with min assist to the walker. Patient took a few steps to the bathroom then hand held assist.  Patient seated and once he is done sit to stand and assisted to the shower. He was able to step over into the shower. His bed mobility is getting better. Patient is doing much better with his rolling in the bed.   Mr. Shawna Tijerina appears to be making slow progress towards his goals. Needs motivation and cues for safety. Will need rehab at discharge. Continue PT efforts. This section established at most recent assessment PROBLEM LIST (Impairments causing functional limitations): 1. Decreased Strength 2. Decreased ADL/Functional Activities 3. Decreased Transfer Abilities 4. Decreased Ambulation Ability/Technique 5. Decreased Balance 6. Decreased Activity Tolerance 7. Increased Fatigue 8. Increased Shortness of Breath 9. Decreased Knowledge of Precautions INTERVENTIONS PLANNED: (Benefits and precautions of physical therapy have been discussed with the patient.) 1. Balance Exercise 2. Bed Mobility 3. Neuromuscular Re-education/Strengthening 4. Therapeutic Activites 5. Therapeutic Exercise/Strengthening 6. Transfer Training 7. education TREATMENT PLAN: Frequency/Duration: twice daily for duration of hospital stay Rehabilitation Potential For Stated Goals: Good REHAB RECOMMENDATIONS (at time of discharge pending progress):   
Placement: It is my opinion, based on this patient's performance to date, that Mr. Chau Hays may benefit from intensive therapy at a [address] [place] [address] after discharge due to the functional deficits listed above that are likely to improve with skilled rehabilitation and severe debility. Equipment:  
? tbd  
? None at this time HISTORY:  
History of Present Injury/Illness (Reason for Referral): 
Patient admitted for above surgery.  
Past Medical History/Comorbidities:  
Mr. Chau Hays  has a past medical history of Arthritis, BPH (benign prostatic hyperplasia) (1/14/2013), CAD (coronary artery disease), DM type 2 (diabetes mellitus, type 2) ([facility] [address] (dx 2004), Dyspnea, Gout (1/14/2013), HLD (hyperlipidemia) (1/14/2013), HTN (hypertension), Morbid obesity ([facility] [address] (9/3/14), Psychiatric disorder, Rheumatic fever, Seasonal allergic rhinitis, Severe aortic valve stenosis, Thyroid disease, and Unspecified sleep apnea (2016). Mr. Rica Melgoza  has a past surgical history that includes hx tonsil and adenoidectomy; hx heart catheterization (12/23/2019); hx orthopaedic (Left); hx cataract removal (Bilateral, 2012); and ir insert tunl cvc w port over 5 years (2/4/2020). Social History/Living Environment:  
Home Environment: Private residence # Steps to Enter: 1 One/Two Story Residence: One story Living Alone: No 
Support Systems: Spouse/Significant Other/Partner Patient Expects to be Discharged to[de-identified] Private residence Current DME Used/Available at Home: Cane, straight, Shower chair Tub or Shower Type: Shower Prior Level of Function/Work/Activity: 
 
He lives with his wife and ambulates independently with cane or RW at baseline. Number of Personal Factors/Comorbidities that affect the Plan of Care: 3+: HIGH COMPLEXITY EXAMINATION:  
Most Recent Physical Functioning:  
Gross Assessment: 
  
         
  
Posture: 
  
Balance: 
Sitting: Intact Sitting - Static: Good (unsupported) Sitting - Dynamic: Good (unsupported) Standing: Impaired Standing - Static: Good Standing - Dynamic : Fair Bed Mobility: 
Rolling: Modified independent; Additional time Supine to Sit: Modified independent; Additional time Scooting: Modified independent; Total assistance Wheelchair Mobility: 
  
Transfers: 
Sit to Stand: Contact guard assistance Stand to Sit: Contact guard assistance Gait: 
  
Base of Support: Narrowed Speed/Federica: Shuffled; Slow Step Length: Left shortened;Right shortened Distance (ft): 7 Feet (ft) Assistive Device: Other (comment)(hand held assist ) Ambulation - Level of Assistance: Minimal assistance Body Structures Involved: 1. Heart 2. Lungs 3. Muscles Body Functions Affected: 1. Cardio 2. Respiratory 3. Neuromusculoskeletal 
4. Movement Related Activities and Participation Affected: 1. Mobility 2. Self Care 3. Domestic Life 4. Community, Social and [place] Number of elements that affect the Plan of Care: 4+: HIGH COMPLEXITY CLINICAL PRESENTATION:  
Presentation: Evolving clinical presentation with changing clinical characteristics: MODERATE COMPLEXITY CLINICAL DECISION MAKIN [facility] [address] [id number] AM-PAC 6 Clicks Basic Mobility Inpatient Short Form How much difficulty does the patient currently have. .. Unable A Lot A Little None 1. Turning over in bed (including adjusting bedclothes, sheets and blankets)? [x] 1   [] 2   [] 3   [] 4  
2. Sitting down on and standing up from a chair with arms ( e.g., wheelchair, bedside commode, etc.)   [x] 1   [] 2   [] 3   [] 4  
3. Moving from lying on back to sitting on the side of the bed? [x] 1   [] 2   [] 3   [] 4 How much help from another person does the patient currently need. .. Total A Lot A Little None 4. Moving to and from a bed to a chair (including a wheelchair)? [x] 1   [] 2   [] 3   [] 4  
5. Need to walk in hospital room? [x] 1   [] 2   [] 3   [] 4  
6. Climbing 3-5 steps with a railing? [x] 1   [] 2   [] 3   [] 4  
© , Trustees of [address] [place] [address] [address] [address], under license to [employer] All rights reserved Score:  Initial: 6 Most Recent: X (Date: -- ) Interpretation of Tool:  Represents activities that are increasingly more difficult (i.e. Bed mobility, Transfers, Gait). Medical Necessity:    
· Patient is expected to demonstrate progress in  
· strength, range of motion, balance, coordination, functional technique, and activity tolerance ·  to  
· decrease assistance required with all functional mobility · . Reason for Services/Other Comments: 
· Patient continues to require skilled intervention due to · medical complications and patient unable to attend/participate in therapy as expected · . Use of outcome tool(s) and clinical judgement create a POC that gives a: Questionable prediction of patient's progress: MODERATE COMPLEXITY  
  
 
 
 
TREATMENT:  
(In addition to Assessment/Re-Assessment sessions the following treatments were rendered) Pre-treatment Symptoms/Complaints: \"I need to go to the bathroom\" Pain: Initial: 0 FLACC Pain Intensity 1: 0 Pain Location 1: Buttocks  Post Session:  0/10 Therapeutic Activity: (    24 minutes): Therapeutic activities including transfers, balance activities, bed mobility and gait training to improve mobility, strength, balance and coordination. Required contact guard assist to min assist     to safey with functional mobility activities . Gait Training (  mins):  Gait training to improve and/or restore physical functioning as related to mobility, strength and balance. Ambulated 7 Feet (ft) with Minimal assistance using a Other (comment)(hand held assist ) and minimal   related to their sit to stand and standing  to promote proper body alignment, promote proper body posture and promote proper body mechanics. DATE: 1/26/20 1/29/20 2/3/20 2/6/20 2/8/20 Straight leg raise     10 B Hip abduct/ adduct X5 AAB    10 B Heel slides  X3 AB  10 x 2 B Hip external/ internal rotation Ankle dorsiflexion/ plantarflexion X5 AB 25 10 x 2 B 25 10 B Sitting unsupported x5'       
LAQ  25  25 10 B   
marching  25  25 10 B Sit to stand     5 times Key:  A=active, AA=active assisted, P=passive, B=bilaterally, R=right, L=left Braces/Orthotics/Lines/Etc:  
· O2 Device: Heated, Hi flow nasal cannula 5L with O2 Treatment/Session Assessment:   
· Response to Treatment:. 
· Interdisciplinary Collaboration:  
o Physical Therapy Assistant 
o Registered Nurse · After treatment position/precautions:  
o Caregiver at bedside 
o In the shower · Compliance with Program/Exercises: Compliant all of the time · Recommendations/Intent for next treatment session:   \"Next visit will focus on advancements to more challenging activities and reduction in assistance provided\". Total Treatment Duration: PT Patient Time In/Time Out Time In: 1140 Time Out: 3489 Angela Cox PTA

## 2020-02-10 NOTE — PROGRESS NOTES
Lab asked RN to request dialysis nurse draw labs for patient. RN called dialysis, dialysis to draw PTT, PT, and INR.

## 2020-02-10 NOTE — DIALYSIS
TRANSFER IN - DIALYSIS Received patient in dialysis unit  from 2232 (unit) for ordered procedure. Consent verified for renal replacement therapy. Patient stable and vital signs stable. /70 P80 Hemodialysis initiated using cvc. Aspirated and flushed both ports without difficulty. Dressing clean, dry and intact. Machine settings per MD order. Heparin 0 unit bolus and 0 units/hr. Will monitor during treatment.

## 2020-02-10 NOTE — PROGRESS NOTES
Critical inr value of 6.3 reported to Dr. Darius Moeller. Orders received to stop argtroban iv and to notify pharmacy.

## 2020-02-10 NOTE — WOUND CARE
Patient seen for vac dressing to left thigh incisions. Placed as ordered. Incisions approximated. No drainage noted on leg, no dressing was in place to quantify drainage amount. Patient continues to have black toes, no new drainage noted. Gluteal cleft fold wound without dressing. Placed new one at this time. Wound base pink and tan,painful. Discussed wound healing with patient and treatment plan. He is in agreement to continue. Answered all questions. Will continue to follow.

## 2020-02-10 NOTE — PROGRESS NOTES
CM continues to follow for discharge planning and/or CM needs. [facility]. [facility] [place] to initiate prior authorization with pt's insurance. No additional CM needs at this time. Will continue to follow.

## 2020-02-10 NOTE — PROGRESS NOTES
PT note:  Treatment deferred as the patient is off the floor at dialysis. Will continue PT efforts.   Natalie Marion, PTA

## 2020-02-10 NOTE — PROGRESS NOTES
PT note:  Treatment deferred this pm as the patient is in bed and receiving a unit of blood. Will continue PT efforts.   Sheree Caicedo, PTA

## 2020-02-10 NOTE — PROGRESS NOTES
Hospitalist Note Admit Date:  1/10/2020  4:59 AM  
Name:  Martha Mendoza Age:  69 y.o. 
:  1945 MRN:  [id number] PCP:  Susi Stinson MD 
Treatment Team: Attending Provider: Jordan Liz MD; Consulting Provider: Roman Landeros MD; Consulting Provider: Fredy Hancock MD; Care Manager: Adrian Galvez; Consulting Provider: Barrera Gomez MD; Utilization Review: Alberto Salas RN; Utilization Review: Katherine Barney RN; Consulting Provider: Rafael Cool MD; Student Nurse: Danisha Sal; Utilization Review: [facility] Primary Nurse: Rony Gordon, OLYA; Physical Therapy Assistant: Carine Carpio; Occupational Therapy Assistant: Julián Burgos 
 
HPI/Subjective:  
 
Mr. 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  
 
 
 
2- seen while on dialysis, admits to depression, has anorexia, loose stools resolved, has edema,  
 
 
Objective:  
 
Patient Vitals for the past 24 hrs: 
 Temp Pulse Resp BP SpO2  
02/10/20 1402 97.7 °F (36.5 °C) 96  108/57 100 % 02/10/20 1342 98.3 °F (36.8 °C) 97 18 110/52   
02/10/20 1153 97.8 °F (36.6 °C) 85 24 132/60 97 % 02/10/20 1132  84  129/72   
02/10/20 1124  87  118/67   
02/10/20 1101  87  115/68   
02/10/20 1029  83  108/58   
02/10/20 1000  85  108/42   
02/10/20 0936  85  113/66   
02/10/20 0858  85  91/65   
02/10/20 0830  84  103/62   
02/10/20 0806  81  101/62   
02/10/20 0730  82  122/70   
02/10/20 0715 98.2 °F (36.8 °C) 87 18 125/56 100 % 02/10/20 0331 98.8 °F (37.1 °C) 83 18 140/73 92 % 20 2228 98 °F (36.7 °C) 76 16 106/78 94 % 20 1933     93 % 02/09/20 1908 97.8 °F (36.6 °C) 80 16 98/48 91 % 02/09/20 1546 98.3 °F (36.8 °C) 83 22 126/52 96 % 02/09/20 1523     94 % Oxygen Therapy O2 Sat (%): 100 % (02/10/20 1402) Pulse via Oximetry: 96 beats per minute (02/10/20 1402) O2 Device: Hi flow nasal cannula (02/10/20 1153) O2 Flow Rate (L/min): 5 l/min (02/10/20 1153) O2 Temperature: 87.8 °F (31 °C) (02/01/20 0659) FIO2 (%): 40 % (02/06/20 2108) Estimated body mass index is 37.85 kg/m² as calculated from the following: 
  Height as of this encounter: 5' 10\" (1.778 m). Weight as of this encounter: 119.7 kg (263 lb 12.8 oz). Intake/Output Summary (Last 24 hours) at 2/10/2020 1439 Last data filed at 2/10/2020 1124 Gross per 24 hour Intake 926.8 ml Output 2150 ml Net -1223.2 ml  
   
*Note that automatically entered I/Os may not be accurate; dependent on patient compliance with collection and accurate  by techs. General:    Well nourished. Alert. Obese, elderly, no distress CV:   RRR. No murmur, rub, or gallop. Trace edema Lungs:   CTAB. No wheezing, rhonchi, or rales. anterior Abdomen:   Soft, nontender, nondistended. obese Skin:     Black digit tips right hand Neuro:  No gross focal deficits Data Review: 
I have reviewed all labs, meds, and studies from the last 24 hours: 
 
Recent Results (from the past 24 hour(s)) GLUCOSE, POC Collection Time: 02/09/20  3:56 PM  
Result Value Ref Range Glucose (POC) 178 (H) 65 - 100 mg/dL GLUCOSE, POC Collection Time: 02/09/20  8:14 PM  
Result Value Ref Range Glucose (POC) 104 (H) 65 - 100 mg/dL PROTHROMBIN TIME + INR Collection Time: 02/10/20  3:40 AM  
Result Value Ref Range Prothrombin time 57.2 (H) 12.0 - 14.7 sec INR 6.3 (HH) METABOLIC PANEL, BASIC Collection Time: 02/10/20  3:40 AM  
Result Value Ref Range Sodium 135 (L) 136 - 145 mmol/L Potassium 4.2 3.5 - 5.1 mmol/L  Chloride 98 98 - 107 mmol/L  
 CO2 25 21 - 32 mmol/L Anion gap 12 7 - 16 mmol/L Glucose 136 (H) 65 - 100 mg/dL BUN 66 (H) 8 - 23 MG/DL Creatinine 6.78 (H) 0.8 - 1.5 MG/DL  
 GFR est AA 10 (L) >60 ml/min/1.73m2 GFR est non-AA 9 (L) >60 ml/min/1.73m2 Calcium 7.8 (L) 8.3 - 10.4 MG/DL  
CBC W/O DIFF Collection Time: 02/10/20  3:40 AM  
Result Value Ref Range WBC 10.0 4.3 - 11.1 K/uL  
 RBC 2.72 (L) 4.23 - 5.6 M/uL HGB 7.9 (L) 13.6 - 17.2 g/dL HCT 25.4 (L) 41.1 - 50.3 % MCV 93.4 79.6 - 97.8 FL  
 MCH 29.0 26.1 - 32.9 PG  
 MCHC 31.1 (L) 31.4 - 35.0 g/dL  
 RDW 14.5 11.9 - 14.6 % PLATELET 805 247 - 711 K/uL MPV 10.0 9.4 - 12.3 FL ABSOLUTE NRBC 0.00 0.0 - 0.2 K/uL PTT Collection Time: 02/10/20  3:40 AM  
Result Value Ref Range aPTT 76.4 (H) 24.3 - 35.4 SEC GLUCOSE, POC Collection Time: 02/10/20  6:11 AM  
Result Value Ref Range Glucose (POC) 130 (H) 65 - 100 mg/dL PROTHROMBIN TIME + INR Collection Time: 02/10/20  9:52 AM  
Result Value Ref Range Prothrombin time 38.1 (H) 12.0 - 14.7 sec INR 3.7 (HH) PTT Collection Time: 02/10/20  9:52 AM  
Result Value Ref Range aPTT 37.2 (H) 24.3 - 35.4 SEC  
TYPE + CROSSMATCH Collection Time: 02/10/20 10:20 AM  
Result Value Ref Range Crossmatch Expiration 02/13/2020 ABO/Rh(D) AB POSITIVE Antibody screen NEG Unit number [id number] Blood component type RC LR Unit division 00 Status of unit ISSUED Crossmatch result Compatible GLUCOSE, POC Collection Time: 02/10/20 10:34 AM  
Result Value Ref Range Glucose (POC) 147 (H) 65 - 100 mg/dL GLUCOSE, POC Collection Time: 02/10/20 12:39 PM  
Result Value Ref Range Glucose (POC) 182 (H) 65 - 100 mg/dL All Micro Results Procedure Component Value Units Date/Time CULTURE, BLOOD [[id number]] Collected:  01/14/20 1005 Order Status:  Completed Specimen:  Blood Updated:  01/19/20 0701 Special Requests: --     
  RIGHT 
ARTL (ART LINE) Culture result: NO GROWTH 5 DAYS     
 CULTURE, BLOOD [[id number]] Collected:  01/14/20 1145 Order Status:  Completed Specimen:  Blood Updated:  01/19/20 0701 Special Requests: --     
  LEFT 
HAND Culture result: NO GROWTH 5 DAYS     
 CULTURE, BLOOD [[id number]] Collected:  01/12/20 5790 Order Status:  Completed Specimen:  Blood Updated:  01/17/20 0740 Special Requests: --     
  RIGHT Antecubital 
  
  Culture result: NO GROWTH 5 DAYS     
 CULTURE, BLOOD [[id number]] Collected:  01/12/20 2260 Order Status:  Completed Specimen:  Blood Updated:  01/17/20 0740 Special Requests: --     
  LEFT 
HAND Culture result: NO GROWTH 5 DAYS     
 CULTURE, URINE [[id number]] Collected:  01/14/20 1950 Order Status:  Completed Specimen:  Urine from [place] Specimen Updated:  01/17/20 2941 Special Requests: NO SPECIAL REQUESTS Culture result: NO GROWTH 2 DAYS     
 CULTURE, RESPIRATORY/SPUTUM/BRONCH Charlotte Nipper [[id number]] Collected:  01/14/20 1132 Order Status:  Completed Specimen:  Sputum,ET Suction Updated:  01/16/20 5773 Special Requests: NO SPECIAL REQUESTS     
  GRAM STAIN 15 TO 20 WBCS SEEN PER OIF  
   0 TO 1 EPITHELIAL CELLS SEEN PER OIF  
   1+ MUCUS PRESENT     
   FEW GRAM POSITIVE RODS     
   FEW GRAM POSITIVE COCCI Culture result:    
  LIGHT NORMAL RESPIRATORY BRENNAN  
     
 CULTURE, RESPIRATORY/SPUTUM/BRONCH [address] [[id number]] Collected:  01/12/20 7959 Order Status:  Completed Specimen:  Sputum from Endotracheal aspirate Updated:  01/14/20 9655 Special Requests: NO SPECIAL REQUESTS     
  GRAM STAIN 15 TO 20 WBCS SEEN PER OIF  
   0 TO 1 EPITHELIAL CELLS SEEN PER OIF  
   2+ MUCUS PRESENT     
   RARE GRAM POSITIVE COCCI Culture result:    
  LIGHT NORMAL RESPIRATORY BRENNAN  
     
 CULTURE, URINE [[id number]] Collected:  01/12/20 0901 Order Status:  Completed Specimen:  Urine from [place] Specimen Updated:  01/14/20 0722 Special Requests: NO SPECIAL REQUESTS Culture result: NO GROWTH 2 DAYS Current Meds: 
Current Facility-Administered Medications Medication Dose Route Frequency  0.9% sodium chloride infusion 250 mL  250 mL IntraVENous PRN  
 Warfarin ON HOLD   Other QPM  
 insulin glargine (LANTUS) injection 28 Units  28 Units SubCUTAneous DAILY  levoFLOXacin (LEVAQUIN) 750 mg in D5W IVPB  750 mg IntraVENous Q48H  
 insulin lispro (HUMALOG) injection 5 Units  5 Units SubCUTAneous TIDAC  mirtazapine (REMERON) tablet 15 mg  15 mg Oral QHS  albuterol (PROVENTIL VENTOLIN) nebulizer solution 2.5 mg  2.5 mg Nebulization QID RT  
 insulin glargine (LANTUS) injection 18 Units  18 Units SubCUTAneous QHS  ALPRAZolam (XANAX) tablet 0.25 mg  0.25 mg Oral QID PRN  
 loperamide (IMODIUM) capsule 2 mg  2 mg Oral Q4H PRN  
 insulin lispro (HUMALOG) injection   SubCUTAneous AC&HS  nitroglycerin (NITROBID) 2 % ointment 1 Inch  1 Inch Topical TID  busPIRone (BUSPAR) tablet 10 mg  10 mg Oral TID  oxyCODONE-acetaminophen (PERCOCET) 5-325 mg per tablet 1 Tab  1 Tab Per NG tube Q4H PRN  
 traMADol (ULTRAM) tablet 50 mg  50 mg Oral Q6H PRN  
 epoetin maritza-epbx (RETACRIT) 14,000 Units combo injection  14,000 Units SubCUTAneous Q7D Other Studies: 
Results for orders placed or performed during the hospital encounter of 01/10/20  
2D ECHO COMPLETE ADULT (TTE) W OR WO CONTR Narrative [address], [address] [facility] 
[phone] Transthoracic Echocardiogram 
2D, M-mode, Doppler, and Color Doppler Patient: Larisa Gonzales 
MR #: [id number] : 1945 Age: 69 years Gender: Male Study date: 2020 Account #: [de-identified] Height: 69.7 in 
Weight: 323.4 lb 
BSA: 2.55 mï¾² Status:Stat Location: 104 BP: / 
 
Allergies: AMOXICILLIN, CEPHALEXIN, PENICILLINS Sonographer:  Sahara Ambrosio [facility] Group:  [address] Cardiology Referring Physician:  Nba Swartz. [address] Reading Physician:  Hebert Hebert. MD Viki De Oliveira INDICATIONS: Hypotension, requiring pressers, LBBB. *Patient with CABG and AVR on 1/10/20. Technically difficult due to patient 
body habitus (324Lbs) and patient on vent. Patient scanned while having Dialysis. * PROCEDURE: This was a stat study. A transthoracic echocardiogram was  
performed. The study included complete 2D imaging, M-mode, complete spectral Doppler,  
and 
color Doppler. Intravenous contrast (Definity) was administered. Echocardiographic views were limited by restricted patient mobility, surgical 
dressings, and poor acoustic window availability. This was a technically 
difficult study. LEFT VENTRICLE: Size was normal. Systolic function was mildly reduced. Ejection 
fraction was estimated in the range of 40 % to 50 %. This study was  
inadequate 
for the evaluation of regional wall motion due to Afib; however, there  
appears 
to be hypokinesis of the septal walls. There was mild-moderate concentric 
hypertrophy. The study was not technically sufficient to allow evaluation of  
LV 
diastolic function. RIGHT VENTRICLE: Not well visualized. The tricuspid jet envelope definition  
was 
inadequate for estimation of RV systolic pressure. LEFT ATRIUM: The atrium was mildly dilated. Not well visualized. RIGHT ATRIUM: Not well visualized. SYSTEMIC VEINS: IVC: Unable to obtain subcostal images due to bandaging from 
recent surgery. AORTIC VALVE: A 25mm Intuity Jon Gutierres valve (1/10/20) was present. The valve was not well visualized. The peak velocity was 2.33 m/s. The mean 
pressure gradient was 11 mmHg. The peak pressure gradient was 22 mmHg. The 
DI=0. 48. The SVi=24.7. The AT=69ms. There was no insufficiency. MITRAL VALVE: Not well visualized. There was no evidence for stenosis. There 
was no regurgitation. TRICUSPID VALVE: Not well visualized. PULMONIC VALVE: Not well visualized. There was no evidence for stenosis. There 
was mild regurgitation. PERICARDIUM: No significant pericardial effusion. AORTA: The root exhibited normal size. SUMMARY: 
 
-  Procedure information: This was a technically difficult study. -  Left ventricle: Systolic function was mildly reduced. Ejection fraction  
was 
estimated in the range of 40 % to 50 %. This study was inadequate for the 
evaluation of regional wall motion due to Afib; however, there appears to be 
hypokinesis of the septal walls. There was mild-moderate concentric  
hypertrophy. -  Left atrium: The atrium was mildly dilated. -  Pericardium: No significant pericardial effusion. SYSTEM MEASUREMENT TABLES 
 
2D mode AoR Diam (2D): 3.4 cm IVS/LVPW (2D): 1 IVSd (2D): 1.4 cm LVIDd (2D): 3.8 cm LVIDs (2D): 3.2 cm 
LVOT Area (2D): 3.1 cm2 LVPWd (2D): 1.5 cm Unspecified Scan Mode Peak Grad; Mean; Antegrade Flow: 18 mm[Hg] Vmax; Antegrade Flow: 218 cm/s LVOT Diam: 2 cm Prepared and signed by 
 
Cece De Oliveira MD [facility] Signed 13-Jan-2020 11:38:29 No results found. Assessment and Plan:  
 
Hospital Problems as of 2/10/2020 Date Reviewed: 2/9/2020 Codes Class Noted - Resolved POA Pleural effusion ICD-10-CM: J90 ICD-9-CM: 511.9  2/3/2020 - Present Unknown HIT (heparin-induced thrombocytopenia) (HCC) ICD-10-CM: S03.10 ICD-9-CM: 289.84  1/23/2020 - Present Unknown Bilateral pneumothorax ICD-10-CM: J93.9 ICD-9-CM: 512.89  1/17/2020 - Present Thrombocytopenia ([facility] [address] ICD-10-CM: D69.6 ICD-9-CM: 287.5  1/17/2020 - Present Shock ([facility] [address] ICD-10-CM: R57.9 ICD-9-CM: 785.50  1/15/2020 - Present Unknown Atrial fibrillation [facility]) ICD-10-CM: I48.91 
ICD-9-CM: 427.31  1/13/2020 - Present Unknown Acute renal failure (ARF) (HCC) ICD-10-CM: N17.9 ICD-9-CM: 584.9  1/11/2020 - Present Unknown Aortic valve stenosis ICD-10-CM: I35.0 ICD-9-CM: 424.1  1/10/2020 - Present Unknown CAD (coronary artery disease) ICD-10-CM: I25.10 ICD-9-CM: 414.00  1/10/2020 - Present Unknown Weaning from respirator [facility]) ICD-10-CM: Z99.11 ICD-9-CM: V46.13  1/10/2020 - Present Acute hypoxemic respiratory failure ([address] ICD-10-CM: J96.01 
ICD-9-CM: 518.81  1/10/2020 - Present * (Principal) S/P CABG x 3 ICD-10-CM: Z95.1 ICD-9-CM: V45.81  1/10/2020 - Present Unknown S/P AVR ICD-10-CM: Z95.2 ICD-9-CM: V43.3  1/10/2020 - Present Unknown DM type 2 (diabetes mellitus, type 2) ([facility]) ICD-10-CM: E11.9 ICD-9-CM: 250.00  1/14/2013 - Present Yes RESOLVED: Metabolic acidosis TFQ-22-MJ: E87.2 ICD-9-CM: 276.2  1/11/2020 - 1/16/2020 Unknown RESOLVED: Hyperkalemia ICD-10-CM: E87.5 ICD-9-CM: 276.7  1/11/2020 - 1/16/2020 Unknown Plan: · DM2: continued lantus every 12 hours, premeal insulin and SSI · Anxiety/depression: maintained on buspar/ remeron · Diarrhea: resolved · Acute hypoxic respiratory failure with pulmonary infiltrates: weaning O2, continued albuterol, levaquin started per pulmonary on 2-9-20 
· HIT: argatroban stopped per vascular, continued on coumadin with INR 3.7 · DEJUAN: on HD · Anemia: followup CBC, continued retacrit Signed: Michael Welch MD

## 2020-02-10 NOTE — PROGRESS NOTES
Nutrition Follow up:  
 Received BPA for Pressure ulcer injury Assessment:  
Diet orders: 1-26: Clear liquid, 1-27: CCHO, cardiac with Glucerna shakes tid, 1-30: Nepro tid, Glucerna discontinued Food/Nutrition History: Pt says he is not eating much better, mostly <25%. Food doesn't appeal to him, he's too tired to eat sometimes and his arms shake so much it makes it difficult to feed himself. He says he did nott get much opportunity to eat breakfast this morning because he was taken for a test where he had to sit in a cold hallway for 3 hours. He says he drinks 2 ot 3 bottles of Nepro a day but has none to drink thus far today. Denies diarrhea today but was given 238 grams of Kayexalate yesterday for concern of high fecal impaction d/t pt having mucoid/semi-formed stools Continues to require HD, receiving 2 to 3 K bath for last 3 treatments. K range 3.9 to 4.6. . With decreased po intake, would not restrict po K intake at this time. Unstageable, full thickness wound gluteal fold/cleft. Darwin Ramírez Wound vac to thigh incision Anthropometrics:Height: 5' 10\" (177.8 cm), Last 3 Recorded Weights in this Encounter 02/08/20 1908 02/09/20 0692 02/10/20 3612 Weight: 110.5 kg (243 lb 11.2 oz) 118.9 kg (262 lb 3.2 oz) 119.7 kg (263 lb 12.8 oz) Edema: 2+ BLE Pre-op standing scale weight: 136.6 kg UBW range per EMR: 300-306# Pt has had successful fluid removal with HD but question accuracy of weights. If accurate pt has had ~30-40# of weight loss (10-13% change c/w severe change) since admission. This would not be unexpected as nutritional intake has been inadequate his entire 31 day admission. Macronutrient needs:(using CBW (Current body weight) 122 kg and IBW 75.5 kg) EER:  0412-2850 kcal /day (15-18 kcal/kg UBW) 
EPR:  91-98 grams protein/day (1.2-1.3 grams/kg IBW)-for HD Intake/Comparative Standards:Average intake for past 6 day(s)/11 recorded meal(s): 30%.  This potentially meets ~25-50% of kcal and ~25-50% of protein needs Meets Criteria for Malnutrition in the context of Acute Illness/Injury [x] Severe Malnutrition, as evidenced by: 
 [x] Nutritional intake of <50% of energy intake compared to estimated energy needs for > 5 days [x] Weight loss of >2% in 1 week, >5% in 1 month, >7.5% in 3 months 
 [] Moderate to severe loss of muscle mass 
 [] Moderate to severe loss of subcutaneous fat 
 [] Moderate to severe edema Intervention: 
Meals and snacks: Continue current diet. Nutrition Supplement Therapy: Continue Nepro tid. Encouraged pt to increase intake of these to 3 per day. Opened one for him to drink as afternoon snack today. [place], [address] [place] [address], [address] [place] [address], [address] [place] [address]

## 2020-02-10 NOTE — PROGRESS NOTES
Michael Horta Admission Date: 1/10/2020 Daily Progress Note: 2/10/2020 The patient's chart is reviewed and the patient is discussed with the staff. The patient's chart is reviewed and the patient is discussed with the staff. CABG x 3 and AVR on 1/10.  Slow to improve. Had small ptx that resolved without intervention. He has developed DEJUAN on CKD - now on dialysis. 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 Subjective: On 5L NC On argatroban gtt for positive HIT Seen during HD Feels weak ,depressed, SOB, not much cough Current Facility-Administered Medications Medication Dose Route Frequency  insulin glargine (LANTUS) injection 28 Units  28 Units SubCUTAneous DAILY  levoFLOXacin (LEVAQUIN) 750 mg in D5W IVPB  750 mg IntraVENous Q48H  
 insulin lispro (HUMALOG) injection 5 Units  5 Units SubCUTAneous TIDAC  warfarin (COUMADIN) tablet 5 mg  5 mg Oral QPM  
 mirtazapine (REMERON) tablet 15 mg  15 mg Oral QHS  albuterol (PROVENTIL VENTOLIN) nebulizer solution 2.5 mg  2.5 mg Nebulization QID RT  
 insulin glargine (LANTUS) injection 18 Units  18 Units SubCUTAneous QHS  ALPRAZolam (XANAX) tablet 0.25 mg  0.25 mg Oral QID PRN  
 loperamide (IMODIUM) capsule 2 mg  2 mg Oral Q4H PRN  
 insulin lispro (HUMALOG) injection   SubCUTAneous AC&HS  nitroglycerin (NITROBID) 2 % ointment 1 Inch  1 Inch Topical TID  busPIRone (BUSPAR) tablet 10 mg  10 mg Oral TID  oxyCODONE-acetaminophen (PERCOCET) 5-325 mg per tablet 1 Tab  1 Tab Per NG tube Q4H PRN  
 traMADol (ULTRAM) tablet 50 mg  50 mg Oral Q6H PRN  
 epoetin maritza-epbx (RETACRIT) 14,000 Units combo injection  14,000 Units SubCUTAneous Q7D  
 argatroban 50 mg in 0.9% sodium chloride 50 mL (1000 mcg/mL) infusion  0.5-10 mcg/kg/min IntraVENous TITRATE Review of Systems Constitutional: negative for fever, chills, sweats Cardiovascular: negative for chest pain, palpitations, syncope, edema Gastrointestinal:  negative for dysphagia, reflux, vomiting, diarrhea, abdominal pain, or melena Neurologic:  negative for focal weakness, numbness, headache Objective:  
 
Vitals:  
 02/10/20 0730 02/10/20 2913 02/10/20 0830 02/10/20 1852 BP: 122/70 101/62 103/62 91/65 Pulse: 82 81 84 85 Resp:      
Temp:      
SpO2:      
Weight:      
Height:      
 
 
 
Intake/Output Summary (Last 24 hours) at 2/10/2020 0909 Last data filed at 2/10/2020 7230 Gross per 24 hour Intake 1176.8 ml Output 450 ml Net 726.8 ml Physical Exam:  
Constitution:  the patient is well developed and in no acute distress EENMT:  Sclera clear, pupils equal, oral mucosa moist 
Respiratory: diminished Cardiovascular:  RRR without M,G,R 
Gastrointestinal: soft and non-tender; with positive bowel sounds. Musculoskeletal: warm without cyanosis. There is plus 1 lower extremity edema. Skin:  no jaundice or rashes, surgical wounds Neurologic: no gross neuro deficits Psychiatric:  alert and oriented x 3 CXR:  
 
 
LAB Recent Labs  
  02/10/20 
1020 02/09/20 
2014 02/09/20 
1556 02/09/20 
1103 02/09/20 
1556 GLUCPOC 130* 104* 178* 312* 143* Recent Labs  
  02/10/20 
0340 02/09/20 
0335 02/08/20 
6076 WBC 10.0 9.4 10.1 HGB 7.9* 8.2* 8.0*  
HCT 25.4* 26.4* 26.1*  
 183 183 INR 6.3* 3.3 2.6 Recent Labs  
  02/10/20 
0340 02/09/20 
0335 02/08/20 
6295 * 135* 136  
K 4.2 4.0 3.9 CL 98 100 100 CO2 25 28 29 * 140* 105* BUN 66* 52* 36* CREA 6.78* 6.37* 5.07* CA 7.8* 7.6* 7.9* Assessment:  (Medical Decision Making) Hospital Problems  Date Reviewed: 2/9/2020 Codes Class Noted POA Pleural effusion small right now but need follow up  ICD-10-CM: J90 ICD-9-CM: 511.9  2/3/2020 Unknown  HIT (heparin-induced thrombocytopenia) (HCC) on argatroban  ICD-10-CM: D75.82 
 ICD-9-CM: 289.84  1/23/2020 Unknown Shock ([facility] [address] ICD-10-CM: R57.9 ICD-9-CM: 785.50  1/15/2020 Unknown Atrial fibrillation [facility] ICD-10-CM: I48.91 
ICD-9-CM: 427.31  1/13/2020 Unknown Acute renal failure (ARF) (HCC) on HD  ICD-10-CM: N17.9 ICD-9-CM: 584.9  1/11/2020 Unknown Aortic valve stenosis ICD-10-CM: I35.0 ICD-9-CM: 424.1  1/10/2020 Unknown Acute hypoxemic respiratory failure ([facility] [address] ICD-10-CM: J96.01 
ICD-9-CM: 518.81  1/10/2020 * (Principal) S/P CABG x 3 ICD-10-CM: Z95.1 ICD-9-CM: V45.81  1/10/2020 Unknown S/P AVR ICD-10-CM: Z95.2 ICD-9-CM: V43.3  1/10/2020 Unknown DM type 2 (diabetes mellitus, type 2) ([facility]) ICD-10-CM: E11.9 ICD-9-CM: 250.00  1/14/2013 Yes Plan:  (Medical Decision Making) --on Levaquin D 2 
- continue to wean 02 
- albuterol with IPPB 
- HD per renal  
- PT More than 50% of the time documented was spent in face-to-face contact with the patient and in the care of the patient on the floor/unit where the patient is located.  
 
Ken Srivastava MD

## 2020-02-10 NOTE — PROGRESS NOTES
[facility] Progress Note Follow-Up on: DEJUAN/CKD Patient seen and examined on HD, dialyzing via left  Qb, UF 2600, complaints of feeling anxious, with increased weakness and fatigue, exertional SOB, orthostatic hypotension, LE edema- better. Labs and chart reviewed- no sign of renal recovery ROS: 
Gen - no fever, no chills, appetite ok CV - no chest pain, no palpitation Lung - + exertional shortness of breath, no cough Abd - no tenderness, no nausea/vomiting, no diarrhea Ext - + edema Exam: 
Vitals:  
 02/10/20 0715 02/10/20 0730 02/10/20 0806 02/10/20 0830 BP: 125/56 122/70 101/62 103/62 Pulse: 87 82 81 84 Resp: 18 Temp: 98.2 °F (36.8 °C) SpO2: 100% Weight:      
Height:      
 
 
 
Intake/Output Summary (Last 24 hours) at 2/10/2020 1957 Last data filed at 2/10/2020 4579 Gross per 24 hour Intake 1176.8 ml Output 450 ml Net 726.8 ml Wt Readings from Last 3 Encounters:  
02/10/20 119.7 kg (263 lb 12.8 oz) 01/08/20 136.6 kg (301 lb 4 oz) 12/23/19 138.8 kg (306 lb) GEN - in no distress, alert and oriented CV - regular rate, S1, S2,  no rub Lung - clear bilaterally, no wheezes Abd - soft, nontender, + BS Ext - 1+ BLE edema Access - left TCC- intact Recent Labs  
  02/10/20 
0340 02/09/20 
0335 02/08/20 
5160 WBC 10.0 9.4 10.1 HGB 7.9* 8.2* 8.0*  
HCT 25.4* 26.4* 26.1*  
 183 183 INR 6.3* 3.3 2.6 Recent Labs  
  02/10/20 
0340 02/09/20 
0335 02/08/20 
5072 * 135* 136  
K 4.2 4.0 3.9 CL 98 100 100 CO2 25 28 29 BUN 66* 52* 36* CREA 6.78* 6.37* 5.07* CA 7.8* 7.6* 7.9*  
* 140* 105* Assessment / Plan: 
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 
  DM type 2 (diabetes mellitus, type 2) ([facility] (1/14/2013) Aortic valve stenosis (1/10/2020) CAD (coronary artery disease) (1/10/2020) Acute hypoxemic respiratory failure ([facility] (1/10/2020) S/P AVR (1/10/2020) Acute renal failure (ARF) ([address] (1/11/2020) Atrial fibrillation ([facility] (1/13/2020) Shock ([address] (1/15/2020) HIT (heparin-induced thrombocytopenia) ([facility] (1/23/2020) Pleural effusion (2/3/2020) 1. DEJUAN/CKD - Previous baseline Cr around 1.8-1.9 
- No signs of renal recovery 
- seen on HD, tolerating UF, catheter functioning well  
2. S/p CABG 3. Volume overload - overall improving 4. 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 Anemia transfuse 1 unit PRBC

## 2020-02-11 NOTE — PROGRESS NOTES
Wound care provided to toes on bilateral feet. All toes except for 5th toe on both feet are dark purple, skin is degloving on all toes and a toenail is missing on the 4th toe on the left foot. After cleansing toes ntg ointment applied per order to darkened areas on toes as described. Xeroform gauze wrapped around each toe and toes covered with 4x4 gauze and secured with radha roll. Patient tolerates well due to decreased sensation in feet.

## 2020-02-11 NOTE — PROGRESS NOTES
[address] [facility] Progress Note Follow-Up on: DEJUAN/CKD Patient seen and examined, nothing new Labs and chart reviewed- no sign of renal recovery ROS: 
Gen - no fever, no chills, appetite ok CV - no chest pain, no palpitation Lung - + exertional shortness of breath, no cough Abd - no tenderness, no nausea/vomiting, no diarrhea Ext - + edema Exam: 
Vitals:  
 02/11/20 0501 02/11/20 0709 02/11/20 0855 02/11/20 1117 BP:  97/51  94/56 Pulse:  75  93 Resp:  19 19 Temp:  98.7 °F (37.1 °C)  97.7 °F (36.5 °C) SpO2:  95% 97% 98% Weight: 114.5 kg (252 lb 6.8 oz) Height:      
 
 
 
Intake/Output Summary (Last 24 hours) at 2/11/2020 1124 Last data filed at 2/11/2020 2489 Gross per 24 hour Intake 780 ml Output 100 ml Net 680 ml Wt Readings from Last 3 Encounters:  
02/11/20 114.5 kg (252 lb 6.8 oz) 01/08/20 136.6 kg (301 lb 4 oz) 12/23/19 138.8 kg (306 lb) GEN - in no distress, alert and oriented CV - regular rate, S1, S2,  no rub Lung - clear bilaterally, no wheezes Abd - soft, nontender, + BS Ext - 1+ BLE edema Access - left TCC- intact Recent Labs  
  02/11/20 
0333 02/10/20 
9922 02/10/20 
0340 02/09/20 
0335 WBC 10.1  --  10.0 9.4 HGB 8.9*  --  7.9* 8.2* HCT 28.7*  --  25.4* 26.4*  
  --  184 183 INR 2.8 3.7* 6.3* 3.3 Recent Labs  
  02/11/20 
0333 02/10/20 
0340 02/09/20 
0335  135* 135* K 4.1 4.2 4.0  
 98 100 CO2 29 25 28 BUN 40* 66* 52* CREA 4.69* 6.78* 6.37* CA 7.8* 7.8* 7.6*  
* 136* 140* Assessment / Plan: 
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 
  DM type 2 (diabetes mellitus, type 2) ([address] (1/14/2013) Aortic valve stenosis (1/10/2020) CAD (coronary artery disease) (1/10/2020) Acute hypoxemic respiratory failure ([address] (1/10/2020) S/P AVR (1/10/2020) Acute renal failure (ARF) ([address] (1/11/2020) Atrial fibrillation ([address] (1/13/2020) Shock ([facility] (1/15/2020) HIT (heparin-induced thrombocytopenia) ([facility] (1/23/2020) Pleural effusion (2/3/2020) 1. DEJUAN/CKD - Previous baseline Cr around 1.8-1.9 
- No signs of renal recovery 
- HD in am  
2. S/p CABG 3. Volume overload - overall improving 4. 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 Anemia transfuse Stable renal fxn Outpt HD for TTS  Kayce pt can be discharged, thx

## 2020-02-11 NOTE — PROGRESS NOTES
Louisa Fernandez Admission Date: 1/10/2020 Daily Progress Note: 2/11/2020 The patient's chart is reviewed and the patient is discussed with the staff. The patient's chart is reviewed and the patient is discussed with the staff. CABG x 3 and AVR on 1/10.  Slow to improve. Had small ptx that resolved without intervention. He has developed DEJUAN on CKD - now on dialysis. 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 Subjective: On 5L NC On argatroban gtt for positive HIT Feels weak ,depressed, SOB, not much cough Took multiple stool softeners and then was having diarrhea all night, didn't sleep well. Current Facility-Administered Medications Medication Dose Route Frequency  0.9% sodium chloride infusion 250 mL  250 mL IntraVENous PRN  
 Warfarin ON HOLD   Other QPM  
 insulin glargine (LANTUS) injection 28 Units  28 Units SubCUTAneous DAILY  levoFLOXacin (LEVAQUIN) 750 mg in D5W IVPB  750 mg IntraVENous Q48H  
 insulin lispro (HUMALOG) injection 5 Units  5 Units SubCUTAneous TIDAC  mirtazapine (REMERON) tablet 15 mg  15 mg Oral QHS  albuterol (PROVENTIL VENTOLIN) nebulizer solution 2.5 mg  2.5 mg Nebulization QID RT  
 insulin glargine (LANTUS) injection 18 Units  18 Units SubCUTAneous QHS  ALPRAZolam (XANAX) tablet 0.25 mg  0.25 mg Oral QID PRN  
 loperamide (IMODIUM) capsule 2 mg  2 mg Oral Q4H PRN  
 insulin lispro (HUMALOG) injection   SubCUTAneous AC&HS  nitroglycerin (NITROBID) 2 % ointment 1 Inch  1 Inch Topical TID  busPIRone (BUSPAR) tablet 10 mg  10 mg Oral TID  oxyCODONE-acetaminophen (PERCOCET) 5-325 mg per tablet 1 Tab  1 Tab Per NG tube Q4H PRN  
 traMADol (ULTRAM) tablet 50 mg  50 mg Oral Q6H PRN  
 epoetin maritza-epbx (RETACRIT) 14,000 Units combo injection  14,000 Units SubCUTAneous Q7D Review of Systems Constitutional: negative for fever, chills, sweats Cardiovascular: negative for chest pain, palpitations, syncope, edema Gastrointestinal:  negative for dysphagia, reflux, vomiting, diarrhea, abdominal pain, or melena Neurologic:  negative for focal weakness, numbness, headache Objective:  
 
Vitals:  
 02/10/20 2331 02/11/20 0341 02/11/20 0501 02/11/20 7676 BP: 119/55 122/86  97/51 Pulse: 87 78  75 Resp: 18 18  19 Temp: 98.3 °F (36.8 °C) 98.1 °F (36.7 °C)  98.7 °F (37.1 °C) SpO2: 97% 98%  95% Weight:   252 lb 6.8 oz (114.5 kg) Height:      
 
 
Intake/Output Summary (Last 24 hours) at 2/11/2020 9235 Last data filed at 2/11/2020 9894 Gross per 24 hour Intake 300 ml Output 1800 ml Net -1500 ml Physical Exam:  
Constitution:  the patient is well developed and in no acute distress EENMT:  Sclera clear, pupils equal, oral mucosa moist 
Respiratory: diminished on left base Cardiovascular:  RRR without M,G,R 
Gastrointestinal: soft and non-tender; with positive bowel sounds. Musculoskeletal: warm without cyanosis. There is plus 1 lower extremity edema. Skin:  no jaundice or rashes, surgical wounds Neurologic: no gross neuro deficits Psychiatric:  alert and oriented x 3 CT: 2/8: LLL pneumonia or atelectasis LAB Recent Labs  
  02/11/20 
0649 02/10/20 
2121 02/10/20 
1606 02/10/20 
1239 02/10/20 
1034 GLUCPOC 109* 142* 185* 182* 147* Recent Labs  
  02/11/20 
0333 02/10/20 
7386 02/10/20 
0340 02/09/20 
0335 WBC 10.1  --  10.0 9.4 HGB 8.9*  --  7.9* 8.2* HCT 28.7*  --  25.4* 26.4*  
  --  184 183 INR 2.8 3.7* 6.3* 3.3 Recent Labs  
  02/11/20 
0333 02/10/20 
0340 02/09/20 
0335  135* 135* K 4.1 4.2 4.0  
 98 100 CO2 29 25 28 * 136* 140* BUN 40* 66* 52* CREA 4.69* 6.78* 6.37* CA 7.8* 7.8* 7.6* Assessment:  (Medical Decision Making) Hospital Problems  Date Reviewed: 2/9/2020 Codes Class Noted POA Pleural effusion small right now but need follow up  ICD-10-CM: J90 ICD-9-CM: 511.9  2/3/2020 Unknown HIT (heparin-induced thrombocytopenia) ([facility]) on argatroban  ICD-10-CM: D75.82 ICD-9-CM: 289.84  1/23/2020 Unknown Shock ([facility] ICD-10-CM: R57.9 ICD-9-CM: 785.50  1/15/2020 Unknown Atrial fibrillation [facility]) ICD-10-CM: I48.91 
ICD-9-CM: 427.31  1/13/2020 Unknown Acute renal failure (ARF) ([facility]) on HD  ICD-10-CM: N17.9 ICD-9-CM: 584.9  1/11/2020 Unknown Aortic valve stenosis ICD-10-CM: I35.0 ICD-9-CM: 424.1  1/10/2020 Unknown Acute hypoxemic respiratory failure ([facility] ICD-10-CM: J96.01 
ICD-9-CM: 518.81  1/10/2020 * (Principal) S/P CABG x 3 ICD-10-CM: Z95.1 ICD-9-CM: V45.81  1/10/2020 Unknown S/P AVR ICD-10-CM: Z95.2 ICD-9-CM: V43.3  1/10/2020 Unknown DM type 2 (diabetes mellitus, type 2) ([facility]) ICD-10-CM: E11.9 ICD-9-CM: 250.00  1/14/2013 Yes Plan:  (Medical Decision Making)  
 
- on Levaquin D 3 
- mobility and IS 
- continue to wean 02 
- albuterol with IPPB 
- HD per renal  
- PT 
- suspect weak or paralyzed left hemidiaphragm by previous chest U/S along with very small effusion More than 50% of the time documented was spent in face-to-face contact with the patient and in the care of the patient on the floor/unit where the patient is located.  
 
Magdi Posada MD

## 2020-02-11 NOTE — PROGRESS NOTES
Today's Date: 2/11/2020 Date of Admission: 1/10/2020 Chart Reviewed. Subjective:  
 
Patient complains of not sleeping well. Medications Reviewed. Objective:  
 
Vitals:  
 02/11/20 3043 02/11/20 0501 02/11/20 5665 02/11/20 0834 BP: 122/86  97/51 Pulse: 78  75 Resp: 18  19 Temp: 98.1 °F (36.7 °C)  98.7 °F (37.1 °C) SpO2: 98%  95% 97% Weight:  252 lb 6.8 oz (114.5 kg) Height:      
 
 
Intake and Output Current Shift: 02/11 0701 - 02/11 1900 In: 480 [P.O.:480] Out: - Last 3 Shifts: 02/09 1901 - 02/11 0700 In: 540 [P.O.:240] Out: 2250 [Urine:550] Physical Exam: 
General: Well Developed, Obese, No Acute Distress, Alert & Oriented x 3, Appropriate mood Neck: supple, no JVD Heart: S1S2 with RRR Lungs: Clear throughout auscultation bilaterally without adventitious sounds Abd: soft, nontender, nondistended, with good bowel sounds Ext: no edema bilaterally Sternal incision: clean, dry, and intact Skin: warm and dry LABS Data Review:  
Recent Labs  
  02/11/20 
0333 02/10/20 
6662 02/10/20 
0340   --  135* K 4.1  --  4.2 BUN 40*  --  66* CREA 4.69*  --  6.78* *  --  136* WBC 10.1  --  10.0 HGB 8.9*  --  7.9*  
HCT 28.7*  --  25.4*  
  --  184 INR 2.8 3.7* 6.3* Estimated Creatinine Clearance: 17.8 mL/min (A) (based on SCr of 4.69 mg/dL (H)). Assessment/Plan:  
 
Principal Problem: 
  S/P CABG x 3 (1/10/2020) complains of weakness when trying to ambulate, informed that it will take time to regain his strength after deconditioning, on O2 by NC, true INR 2.8 today, on coumadin, continue PT/OT 
  
Active Problems: 
  Aortic valve stenosis (1/10/2020) 
  S/p AVR 
  
  CAD (coronary artery disease) (1/10/2020) S/p CABG 
  
  Weaning from respirator [facility]) (1/10/2020)   
  
  Acute hypoxemic respiratory failure ([address] (1/10/2020) On O2 by NC 
  
  S/P AVR (1/10/2020)   
  
  Acute renal failure (ARF) ([address] (1/11/2020) On HD, nephrology following, no signs of recovery thus far 
  
  Atrial fibrillation ([address] (1/13/2020) Intermittent, continue oral amiodarone, no BB with low BP at times 
  
  Shock ([address] (1/15/2020) 
resolved 
  
  Bilateral pneumothorax (1/17/2020) 
resolved 
  
  Thrombocytopenia ([address] (1/17/2020) 
  Due to HIT, improving  
  
  HIT (heparin-induced thrombocytopenia) ([address] (1/23/2020) Argatroban stopped, continue coumadin 
  
  Pleural effusion (2/3/2020)   
  
  Depression Remeron added by hospitalist, monitor 
  
Dispo 
 will need STR, anticipate dc in 1-2 days Whitney Gilliam PA-C

## 2020-02-11 NOTE — PROGRESS NOTES
Hospitalist Note Admit Date:  1/10/2020  4:59 AM  
Name:  Angelo Woodruff Age:  69 y.o. 
:  1945 MRN:  [id number] PCP:  Jessenia Falk MD 
Treatment Team: Attending Provider: Nemo Mena MD; Consulting Provider: Heraclio Petit MD; Consulting Provider: Riley Oconnor MD; Care Manager: William Yee; Consulting Provider: Mateo Saini MD; Utilization Review: Sharri Dill RN; Utilization Review: Gucci Irwin RN; Consulting Provider: Jo-Ann Lanza MD; Student Nurse: Guzman Blackburn; Utilization Review: Pascale Ramírez Primary Nurse: Michi Marvin; Occupational Therapy Assistant: Li Moy; Physical Therapy Assistant: Cathi Choudhury PTA 
 
HPI/Subjective:  
 
 
Mr. Lisbeth Ritchie is a 70 yo male with PMH of CAD s/p CABG, s/p AVR, afib, DM2, morbid obesity, JOAQUIM who developed acute hypoxic respiratory failure on AIRVO, DEJUAN requiring dialysis, HIT requiring argatroban, bilateral pneumothorax, shock requiring pressors who is under hospitalist management for DM2.  
 
 
 
 more diet tolerant, feels like has ongoing BM nursing denies diarrhea, no abd pain, getting out of bed Objective:  
 
Patient Vitals for the past 24 hrs: 
 Temp Pulse Resp BP SpO2  
20 1220     95 % 20 1117 97.7 °F (36.5 °C) 93 19 94/56 98 % 20 0855     97 % 20 0709 98.7 °F (37.1 °C) 75 19 97/51 95 % 20 0341 98.1 °F (36.7 °C) 78 18 122/86 98 % 02/10/20 2331 98.3 °F (36.8 °C) 87 18 119/55 97 % 02/10/20 2052     97 % 02/10/20 1639    103/53   
02/10/20 1607 98.8 °F (37.1 °C) 88 20 191/72 98 % 02/10/20 1559     96 % 02/10/20 1502 98.4 °F (36.9 °C) 89 20 103/51 98 % Oxygen Therapy O2 Sat (%): 95 % (20 1220) Pulse via Oximetry: 104 beats per minute (20 1220) O2 Device: Nasal cannula (20) O2 Flow Rate (L/min): 3 l/min (200) O2 Temperature: 87.8 °F (31 °C) (02/01/20 0659) FIO2 (%): 40 % (02/06/20 2108) Estimated body mass index is 36.22 kg/m² as calculated from the following: 
  Height as of this encounter: 5' 10\" (1.778 m). Weight as of this encounter: 114.5 kg (252 lb 6.8 oz). Intake/Output Summary (Last 24 hours) at 2/11/2020 1433 Last data filed at 2/11/2020 1218 Gross per 24 hour Intake 1020 ml Output 100 ml Net 920 ml  
   
*Note that automatically entered I/Os may not be accurate; dependent on patient compliance with collection and accurate  by techs. General:    Well nourished. Alert. Obese, elderly, no distress CV:   RRR. No murmur, rub, or gallop. Trace edema Lungs:   CTAB. No wheezing, rhonchi, or rales. Abdomen:   Soft, nontender, nondistended. obese Neuro:  No gross focal deficits Data Review: 
I have reviewed all labs, meds, and studies from the last 24 hours: 
 
Recent Results (from the past 24 hour(s)) GLUCOSE, POC Collection Time: 02/10/20  4:06 PM  
Result Value Ref Range Glucose (POC) 185 (H) 65 - 100 mg/dL GLUCOSE, POC Collection Time: 02/10/20  9:21 PM  
Result Value Ref Range Glucose (POC) 142 (H) 65 - 100 mg/dL PROTHROMBIN TIME + INR Collection Time: 02/11/20  3:33 AM  
Result Value Ref Range Prothrombin time 30.4 (H) 12.0 - 14.7 sec INR 2.8 METABOLIC PANEL, BASIC Collection Time: 02/11/20  3:33 AM  
Result Value Ref Range Sodium 139 136 - 145 mmol/L Potassium 4.1 3.5 - 5.1 mmol/L Chloride 104 98 - 107 mmol/L  
 CO2 29 21 - 32 mmol/L Anion gap 6 (L) 7 - 16 mmol/L Glucose 129 (H) 65 - 100 mg/dL BUN 40 (H) 8 - 23 MG/DL Creatinine 4.69 (H) 0.8 - 1.5 MG/DL  
 GFR est AA 16 (L) >60 ml/min/1.73m2 GFR est non-AA 13 (L) >60 ml/min/1.73m2 Calcium 7.8 (L) 8.3 - 10.4 MG/DL  
CBC W/O DIFF Collection Time: 02/11/20  3:33 AM  
Result Value Ref Range WBC 10.1 4.3 - 11.1 K/uL  
 RBC 3.08 (L) 4.23 - 5.6 M/uL HGB 8.9 (L) 13.6 - 17.2 g/dL HCT 28.7 (L) 41.1 - 50.3 % MCV 93.2 79.6 - 97.8 FL  
 MCH 28.9 26.1 - 32.9 PG  
 MCHC 31.0 (L) 31.4 - 35.0 g/dL  
 RDW 14.8 (H) 11.9 - 14.6 % PLATELET 076 071 - 413 K/uL MPV 9.7 9.4 - 12.3 FL ABSOLUTE NRBC 0.00 0.0 - 0.2 K/uL GLUCOSE, POC Collection Time: 02/11/20  6:49 AM  
Result Value Ref Range Glucose (POC) 109 (H) 65 - 100 mg/dL GLUCOSE, POC Collection Time: 02/11/20 11:18 AM  
Result Value Ref Range Glucose (POC) 242 (H) 65 - 100 mg/dL All Micro Results Procedure Component Value Units Date/Time CULTURE, BLOOD [[id number]] Collected:  01/14/20 1005 Order Status:  Completed Specimen:  Blood Updated:  01/19/20 0701 Special Requests: --     
  RIGHT 
ARTL (ART LINE) Culture result: NO GROWTH 5 DAYS     
 CULTURE, BLOOD [[id number]] Collected:  01/14/20 1145 Order Status:  Completed Specimen:  Blood Updated:  01/19/20 0701 Special Requests: --     
  LEFT 
HAND Culture result: NO GROWTH 5 DAYS     
 CULTURE, BLOOD [[id number]] Collected:  01/12/20 6704 Order Status:  Completed Specimen:  Blood Updated:  01/17/20 0740 Special Requests: --     
  RIGHT Antecubital 
  
  Culture result: NO GROWTH 5 DAYS     
 CULTURE, BLOOD [[id number]] Collected:  01/12/20 6710 Order Status:  Completed Specimen:  Blood Updated:  01/17/20 0740 Special Requests: --     
  LEFT 
HAND Culture result: NO GROWTH 5 DAYS     
 CULTURE, URINE [[id number]] Collected:  01/14/20 1950 Order Status:  Completed Specimen:  Urine from [place] Specimen Updated:  01/17/20 6234 Special Requests: NO SPECIAL REQUESTS Culture result: NO GROWTH 2 DAYS     
 CULTURE, RESPIRATORY/SPUTUM/BRONCH Mary Juan Carlos [[id number]] Collected:  01/14/20 1132 Order Status:  Completed Specimen:  Sputum,ET Suction Updated:  01/16/20 5334   Special Requests: NO SPECIAL REQUESTS     
  GRAM STAIN 15 TO 20 WBCS SEEN PER OIF  
 0 TO 1 EPITHELIAL CELLS SEEN PER OIF  
   1+ MUCUS PRESENT     
   FEW GRAM POSITIVE RODS     
   FEW GRAM POSITIVE COCCI Culture result:    
  LIGHT NORMAL RESPIRATORY BRENNAN  
     
 CULTURE, RESPIRATORY/SPUTUM/BRONCH W Vivianne Dakin [[id number]] Collected:  01/12/20 7600 Order Status:  Completed Specimen:  Sputum from Endotracheal aspirate Updated:  01/14/20 0532 Special Requests: NO SPECIAL REQUESTS     
  GRAM STAIN 15 TO 20 WBCS SEEN PER OIF  
   0 TO 1 EPITHELIAL CELLS SEEN PER OIF  
   2+ MUCUS PRESENT     
   RARE GRAM POSITIVE COCCI Culture result:    
  LIGHT NORMAL RESPIRATORY BRENNAN  
     
 CULTURE, URINE [[id number]] Collected:  01/12/20 0901 Order Status:  Completed Specimen:  Urine from [place] Specimen Updated:  01/14/20 0725 Special Requests: NO SPECIAL REQUESTS Culture result: NO GROWTH 2 DAYS Current Meds: 
Current Facility-Administered Medications Medication Dose Route Frequency  warfarin (COUMADIN) tablet 2.5 mg  2.5 mg Oral QPM  
 0.9% sodium chloride infusion 250 mL  250 mL IntraVENous PRN  
 insulin glargine (LANTUS) injection 28 Units  28 Units SubCUTAneous DAILY  levoFLOXacin (LEVAQUIN) 750 mg in D5W IVPB  750 mg IntraVENous Q48H  
 insulin lispro (HUMALOG) injection 5 Units  5 Units SubCUTAneous TIDAC  mirtazapine (REMERON) tablet 15 mg  15 mg Oral QHS  albuterol (PROVENTIL VENTOLIN) nebulizer solution 2.5 mg  2.5 mg Nebulization QID RT  
 insulin glargine (LANTUS) injection 18 Units  18 Units SubCUTAneous QHS  ALPRAZolam (XANAX) tablet 0.25 mg  0.25 mg Oral QID PRN  
 loperamide (IMODIUM) capsule 2 mg  2 mg Oral Q4H PRN  
 insulin lispro (HUMALOG) injection   SubCUTAneous AC&HS  nitroglycerin (NITROBID) 2 % ointment 1 Inch  1 Inch Topical TID  busPIRone (BUSPAR) tablet 10 mg  10 mg Oral TID  oxyCODONE-acetaminophen (PERCOCET) 5-325 mg per tablet 1 Tab  1 Tab Per NG tube Q4H PRN  
  traMADol (ULTRAM) tablet 50 mg  50 mg Oral Q6H PRN  
 epoetin maritza-epbx (RETACRIT) 14,000 Units combo injection  14,000 Units SubCUTAneous Q7D Other Studies: 
Results for orders placed or performed during the hospital encounter of 01/10/20  
2D ECHO COMPLETE ADULT (TTE) W OR WO CONTR Narrative [facility] [address] [place], [address] [facility] 
[phone] Transthoracic Echocardiogram 
2D, M-mode, Doppler, and Color Doppler Patient: Yovana Patel 
MR #: [id number] : 1945 Age: 69 years Gender: Male Study date: 2020 Account #: [de-identified] Height: 69.7 in 
Weight: 323.4 lb 
BSA: 2.55 mï¾² Status:Stat Location: [facility] BP: / 
 
Allergies: AMOXICILLIN, CEPHALEXIN, PENICILLINS Sonographer:  Charity Mares [facility] Group:  [facility] Referring Physician:  Jamar Neil. Hailey Mcnally [address] Reading Physician:  Eveline Lemon. MD Alvino [facility] INDICATIONS: Hypotension, requiring pressers, LBBB. *Patient with CABG and AVR on 1/10/20. Technically difficult due to patient 
body habitus (324Lbs) and patient on vent. Patient scanned while having Dialysis. * PROCEDURE: This was a stat study. A transthoracic echocardiogram was  
performed. The study included complete 2D imaging, M-mode, complete spectral Doppler,  
and 
color Doppler. Intravenous contrast (Definity) was administered. Echocardiographic views were limited by restricted patient mobility, surgical 
dressings, and poor acoustic window availability. This was a technically 
difficult study. LEFT VENTRICLE: Size was normal. Systolic function was mildly reduced. Ejection 
fraction was estimated in the range of 40 % to 50 %. This study was  
inadequate 
for the evaluation of regional wall motion due to Afib; however, there  
appears 
to be hypokinesis of the septal walls. There was mild-moderate concentric 
hypertrophy.  The study was not technically sufficient to allow evaluation of  
 LV 
diastolic function. RIGHT VENTRICLE: Not well visualized. The tricuspid jet envelope definition  
was 
inadequate for estimation of RV systolic pressure. LEFT ATRIUM: The atrium was mildly dilated. Not well visualized. RIGHT ATRIUM: Not well visualized. SYSTEMIC VEINS: IVC: Unable to obtain subcostal images due to bandaging from 
recent surgery. AORTIC VALVE: A 25mm Intuity Jon Gutierres valve (1/10/20) was present. The valve was not well visualized. The peak velocity was 2.33 m/s. The mean 
pressure gradient was 11 mmHg. The peak pressure gradient was 22 mmHg. The 
DI=0. 48. The SVi=24.7. The AT=69ms. There was no insufficiency. MITRAL VALVE: Not well visualized. There was no evidence for stenosis. There 
was no regurgitation. TRICUSPID VALVE: Not well visualized. PULMONIC VALVE: Not well visualized. There was no evidence for stenosis. There 
was mild regurgitation. PERICARDIUM: No significant pericardial effusion. AORTA: The root exhibited normal size. SUMMARY: 
 
-  Procedure information: This was a technically difficult study. -  Left ventricle: Systolic function was mildly reduced. Ejection fraction  
was 
estimated in the range of 40 % to 50 %. This study was inadequate for the 
evaluation of regional wall motion due to Afib; however, there appears to be 
hypokinesis of the septal walls. There was mild-moderate concentric  
hypertrophy. -  Left atrium: The atrium was mildly dilated. -  Pericardium: No significant pericardial effusion. SYSTEM MEASUREMENT TABLES 
 
2D mode AoR Diam (2D): 3.4 cm IVS/LVPW (2D): 1 IVSd (2D): 1.4 cm LVIDd (2D): 3.8 cm LVIDs (2D): 3.2 cm 
LVOT Area (2D): 3.1 cm2 LVPWd (2D): 1.5 cm Unspecified Scan Mode Peak Grad; Mean; Antegrade Flow: 18 mm[Hg] Vmax; Antegrade Flow: 218 cm/s LVOT Diam: 2 cm Prepared and signed by 
 
Jaja De Oliveira MD [facility] Signed 13-Jan-2020 11:38:29 No results found. Assessment and Plan:  
 
Hospital Problems as of 2/11/2020 Date Reviewed: 2/9/2020 Codes Class Noted - Resolved POA Pleural effusion ICD-10-CM: J90 ICD-9-CM: 511.9  2/3/2020 - Present Unknown HIT (heparin-induced thrombocytopenia) ([facility]) ICD-10-CM: Q86.21 ICD-9-CM: 289.84  1/23/2020 - Present Unknown Bilateral pneumothorax ICD-10-CM: J93.9 ICD-9-CM: 512.89  1/17/2020 - Present Thrombocytopenia ([facility] [address] ICD-10-CM: D69.6 ICD-9-CM: 287.5  1/17/2020 - Present Shock ([facility] ICD-10-CM: R57.9 ICD-9-CM: 785.50  1/15/2020 - Present Unknown Atrial fibrillation [facility]) ICD-10-CM: I48.91 
ICD-9-CM: 427.31  1/13/2020 - Present Unknown Acute renal failure (ARF) ([facility]) ICD-10-CM: N17.9 ICD-9-CM: 584.9  1/11/2020 - Present Unknown Aortic valve stenosis ICD-10-CM: I35.0 ICD-9-CM: 424.1  1/10/2020 - Present Unknown CAD (coronary artery disease) ICD-10-CM: I25.10 ICD-9-CM: 414.00  1/10/2020 - Present Unknown Weaning from respirator [facility]) ICD-10-CM: Z99.11 ICD-9-CM: V46.13  1/10/2020 - Present Acute hypoxemic respiratory failure ([facility] ICD-10-CM: J96.01 
ICD-9-CM: 518.81  1/10/2020 - Present * (Principal) S/P CABG x 3 ICD-10-CM: Z95.1 ICD-9-CM: V45.81  1/10/2020 - Present Unknown S/P AVR ICD-10-CM: Z95.2 ICD-9-CM: V43.3  1/10/2020 - Present Unknown DM type 2 (diabetes mellitus, type 2) ([facility]) ICD-10-CM: E11.9 ICD-9-CM: 250.00  1/14/2013 - Present Yes RESOLVED: Metabolic acidosis FCC-52-AV: E87.2 ICD-9-CM: 276.2  1/11/2020 - 1/16/2020 Unknown RESOLVED: Hyperkalemia ICD-10-CM: E87.5 ICD-9-CM: 276.7  1/11/2020 - 1/16/2020 Unknown Plan: · DM2: continued lantus every 12 hours, premeal insulin and SSI-stable and will be available as needed, please call thanks · Anxiety/depression: maintained on buspar/ remeron · Diarrhea: resolved · Acute hypoxic respiratory failure with pulmonary infiltrates: weaning O2, continued albuterol, levaquin started per pulmonary on 2-9-20 
· HIT: argatroban stopped per vascular, continued on coumadin with INR 2.8 · DEJUAN: on HD · Anemia: followup CBC, continued retacrit Signed: Melissa Onofre MD

## 2020-02-11 NOTE — PROGRESS NOTES
Pt had 2 very small formed BM he request medication to stop BM. I explained that his stools are not diaherra and he is constipated and needs to contiune to have these BM to clean himself out.

## 2020-02-11 NOTE — DISCHARGE SUMMARY
Discharge Summary Patient ID: 
Immanuel Gloria [id number] 
89 y.o. 
1945 Admit date: 1/10/2020 Discharge date:  2/12/2020 Admitting Physician: Steve Hart MD  
 
Discharge Physician: SURENDRA Clemons/Dr. Payton Dandy Admission Diagnoses: Atherosclerosis of native coronary artery of native heart with unstable angina pectoris ([address] [I25.110] Nonrheumatic aortic valve stenosis [I35.0] CAD (coronary artery disease) [I25.10] Aortic valve stenosis [I35.0] Discharge Diagnoses:  
Patient Active Problem List  
 Diagnosis Date Noted  Pleural effusion 02/03/2020  
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) 01/23/2020  Bilateral pneumothorax 01/17/2020  Thrombocytopenia ([address] 01/17/2020  Shock ([address] 01/15/2020  Atrial fibrillation ([address] 01/13/2020  Acute renal failure (ARF) ([address] 01/11/2020  Aortic valve stenosis 01/10/2020  CAD (coronary artery disease) 01/10/2020  Weaning from respirator [facility]) 01/10/2020  Acute hypoxemic respiratory failure ([address] 01/10/2020  S/P CABG x 3 01/10/2020  S/P AVR 01/10/2020  Bilateral carotid artery stenosis 11/24/2019  Type 2 diabetes with nephropathy ([address] 08/26/2019  Obesity, morbid ([address] 10/19/2018  Nonrheumatic aortic valve stenosis 04/13/2018  DM type 2 (diabetes mellitus, type 2) ([address] 01/14/2013  Gout 01/14/2013  
 HTN (hypertension) 01/14/2013  BPH (benign prostatic hyperplasia) 01/14/2013  Seasonal allergic rhinitis 01/14/2013  HLD (hyperlipidemia) 01/14/2013 Procedures this admission:  Aortic valve replacement, Coronary Artery Bypass Graft Surgery, TERRI, implantation of dual chamber pacemaker Consults: Pulmonary/Intensive Care, Nephrology, Cardiology, Hematology, Vascular Surgery, [facility] Hospital Course: Patient presented for elective AVR/CABG. He had been followed in the office by Dr. Ching Altamirano.  He complained of worsening exertional dyspnea and chest pressure. Echo showed progression of AS with JUAN MIGUEL of 0.82cm2, mean gradient of 40mmHg and peak gradient of 61mmHg. TAVR procedure was discussed. He underwent CT scan that showed multivessel coronary artery disease with calcification and evidence of mixed plaque with severe stenosis of the LAD. LHC was then planned. He underwent cardiac catheterization that showed severe multivessel disease. AVR/CABG was planned. He underwent aortic valve replacement with a 25mm Gutierres Intuity valve as well as CABG x 3 with LIMA to LAD, reverse SVG to the OM and reverse SVG to the PDA on 1/10/20. Post op, he was hyperkalemic and had decreased UOP. Creatinine was elevated. Nephrology was consulted. He was agitated and continued to require pressor and vent support. Renal function continued to worsen and he required ongoing treatment for hyperkalemia. He was started on CRRT. He had worsening leukocytosis and procalcitonin was elevated. He was started on broad spectrum antibiotics. He was transfused for anemia. He had intermittent a-fib with RVR and was started on IV amiodarone. He had episodes of junctional rhythm as well. Cardiology was consulted to follow. He underwent bedside TERRI on 1/13 with normal LV function noted and normal function of AVR. There was no pericardial effusion. He remained febrile. CRRT was continued. IV amiodarone was continued for intermittent a-fib with RVR. He continued to require temp pacing. He was started on tube feeding. Fever resolved. Vasopressors were slowly weaned. He developed worsening thrombocytopenia. He was started on argatroban. HIT panel was sent. He was transfused again for anemia. Hematology was consulted. HIT panel was positive. SHARON was positive as well. He was transitioned to oral amiodarone. LE ultrasound showed left popliteal venous thrombus. Upper extremity ultrasound showed occlusive and nonocclusive thrombus within the right upper extremity. Platelet count slowly began to improve. He required additional PRBCs for anemia. He continued to have intermittent 2:1 AV block. Pacemaker implantation was planned. He underwent implantation of a Biotronik dual chamber pacemaker on 1/24/20. He was extubated on 1/25 to Airvo. Oral intake was resumed. He was transitioned to HD. Platelet count continued to improve. He was seen by vascular surgery on 1/28 after staff noted worsening discoloration of fingertips and toes. He had noted paraesthesias in toes for a few months prior to admission. Discoloration was felt due to long-term vasoconstriction. Rooke boots and topical NTG was recommended. Wound vac treatment was continued on left leg for excessive serous drainage from vein harvest sites. He gradually improved but felt very weak. There were no signs of renal recovery. A Permcath was placed on 2/4. Coumadin was started on 2/4. He had less O2 requirement and was moved to CV stepdown on 2/5. He complained of depression and severe weakness when trying to ambulate. Remeron was added to his regimen. He was previously on Buspar for anxiety. He had a small pleural effusion on the left. US performed but thoracentesis was not needed. He participated in PT/OT but felt very weak and fatigued after therapy. Due to ongoing left sided infiltrate and elevated procalcitonin. He was started on Levaquin on 2/9. On 2/10, his true INR was 3.7. Argatroban was stopped. Coumadin was continued. BP remained borderline low at times so BB was not started. He felt very weak after ambulating short distances. STR was recommended and bed was arranged. The morning of 2/11/20, patient was feeling well and was determined stable and ready for discharge to STR after HD. He will continue HD at discharge. He will continue anticoagulation for at least 3 months after LILIBETH. For maximized medical therapy for CAD, patient will continue ASA and statin as well. He is not on BB or ACE-I/ARB due to low BP and DEJUAN. He will complete course of Levaquin at discharge. The patient will have close transitional care follow up with [address] [place] [id number] [address] Tony Pink on February 21st at 9:45am [facility]). The patient will follow up with Dr. Drew White on March 4th at 2:20pm and has been referred to cardiac rehab. He will follow up with [facility] on outpatient basis. *He will have device check at [address] [place] [id number] [address] in May for Biotronik PPM. DISPOSITION: The patient is being discharged to rehab facility in stable condition on a low saturated fat, low cholesterol and low salt diet. The patient is instructed to advance activities as tolerated to the limit of fatigue or shortness of breath. The patient is instructed to avoid all heavy lifting or activities that strain the chest or upper arm muscles. Strenuous activity should be avoided. The patient is instructed to watch for signs of infection which include: increasing area of redness, fever or purulent drainage at the incision site. The patient is instructed to call the office or return to the ER for immediate evaluation for any shortness of breath or chest pain not relieved by NTG. Discharge Exam:  
Visit Vitals /57 Pulse 64 Temp 97.9 °F (36.6 °C) Resp 20 Ht 5' 10\" (1.778 m) Wt 255 lb (115.7 kg) SpO2 90% BMI 36.59 kg/m² Physical Exam: 
General: Well Developed, Obese, No Acute Distress, Alert & Oriented Neck: supple, no JVD Heart: S1S2 with RRR without murmurs or gallops Lungs: Clear throughout auscultation bilaterally without adventitious sounds Abd: soft, nontender, nondistended, with good bowel sounds Ext: warm, no edema Sternal incision: clean, dry, and intact Skin: warm and dry Recent Results (from the past 24 hour(s)) GLUCOSE, POC Collection Time: 02/11/20 11:18 AM  
Result Value Ref Range Glucose (POC) 242 (H) 65 - 100 mg/dL GLUCOSE, POC Collection Time: 02/11/20  4:06 PM  
Result Value Ref Range Glucose (POC) 248 (H) 65 - 100 mg/dL GLUCOSE, POC Collection Time: 02/11/20  9:09 PM  
Result Value Ref Range Glucose (POC) 136 (H) 65 - 100 mg/dL PROTHROMBIN TIME + INR Collection Time: 02/12/20  4:00 AM  
Result Value Ref Range Prothrombin time 28.7 (H) 12.0 - 14.7 sec INR 2.6 METABOLIC PANEL, BASIC Collection Time: 02/12/20  4:00 AM  
Result Value Ref Range Sodium 137 136 - 145 mmol/L Potassium 4.4 3.5 - 5.1 mmol/L Chloride 102 98 - 107 mmol/L  
 CO2 27 21 - 32 mmol/L Anion gap 8 7 - 16 mmol/L Glucose 93 65 - 100 mg/dL BUN 57 (H) 8 - 23 MG/DL Creatinine 5.85 (H) 0.8 - 1.5 MG/DL  
 GFR est AA 12 (L) >60 ml/min/1.73m2 GFR est non-AA 10 (L) >60 ml/min/1.73m2 Calcium 7.9 (L) 8.3 - 10.4 MG/DL  
CBC W/O DIFF Collection Time: 02/12/20  4:00 AM  
Result Value Ref Range WBC 9.5 4.3 - 11.1 K/uL  
 RBC 2.97 (L) 4.23 - 5.6 M/uL HGB 8.7 (L) 13.6 - 17.2 g/dL HCT 27.4 (L) 41.1 - 50.3 % MCV 92.3 79.6 - 97.8 FL  
 MCH 29.3 26.1 - 32.9 PG  
 MCHC 31.8 31.4 - 35.0 g/dL  
 RDW 14.6 11.9 - 14.6 % PLATELET 677 819 - 858 K/uL MPV 9.6 9.4 - 12.3 FL ABSOLUTE NRBC 0.00 0.0 - 0.2 K/uL PROCALCITONIN Collection Time: 02/12/20  4:00 AM  
Result Value Ref Range Procalcitonin 1.30 ng/mL GLUCOSE, POC Collection Time: 02/12/20  6:19 AM  
Result Value Ref Range Glucose (POC) 89 65 - 100 mg/dL Patient Instructions:  
Current Discharge Medication List  
  
START taking these medications Details  
mirtazapine (REMERON) 15 mg tablet Take 1 Tab by mouth nightly. Qty: 30 Tab, Refills: 3 ALPRAZolam (XANAX) 0.25 mg tablet Take 1 Tab by mouth three (3) times daily as needed for Anxiety. Max Daily Amount: 0.75 mg. Qty: 30 Tab, Refills: 0  Associated Diagnoses: S/P CABG x 3; S/P AVR  
  
loperamide (IMODIUM) 2 mg capsule Take 1 Cap by mouth every four (4) hours as needed for Diarrhea.  
  
warfarin (COUMADIN) 2.5 mg tablet Take 1 Tab by mouth every evening. Qty: 30 Tab, Refills: 1  
  
 insulin glargine (LANTUS) 100 unit/mL injection 18 Units by SubCUTAneous route nightly. Qty: 1 Vial, Refills: 0  
  
 insulin glargine (LANTUS) 100 unit/mL injection 28 Units by SubCUTAneous route daily. Qty: 1 Vial, Refills: 0  
  
traMADol (ULTRAM) 50 mg tablet Take 1 Tab by mouth every six (6) hours as needed for Pain for up to 30 days. Max Daily Amount: 200 mg. Qty: 30 Tab, Refills: 0 Associated Diagnoses: S/P CABG x 3; S/P AVR  
  
levoFLOXacin (LEVAQUIN) 500 mg tablet Take 1 Tab by mouth every fourty-eight (48) hours for 4 doses. Qty: 4 Tab, Refills: 0 CONTINUE these medications which have CHANGED Details  
busPIRone (BUSPAR) 10 mg tablet Take 1 Tab by mouth three (3) times daily. Qty: 270 Tab, Refills: 3 Associated Diagnoses: Anxiety CONTINUE these medications which have NOT CHANGED Details  
allopurinol (ZYLOPRIM) 300 mg tablet Take 1 Tab by mouth daily. 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  
  
albuterol (PROVENTIL HFA) 90 mcg/actuation inhaler Take 2 Puffs by inhalation every six (6) hours as needed for Wheezing. Qty: 3 Inhaler, Refills: 3 Associated Diagnoses: Acute bronchitis, unspecified organism  
  
ascorbic acid (VITAMIN C) 500 mg tablet Take 1,000 mg by mouth daily. lutein 20 mg tab Take 1 tablet by mouth daily. coenzyme q10-vitamin e (COQ10 ) 100-100 mg-unit cap Take 300 mg by mouth daily. multivitamins-minerals-lutein (CENTRUM SILVER) Tab Take 1 tablet by mouth daily. STOP taking these medications  
  
 amiodarone (CORDARONE) 200 mg tablet Comments:  
Reason for Stopping:   
   
 metoprolol tartrate (LOPRESSOR) 25 mg tablet Comments:  
Reason for Stopping: insulin glargine (LANTUS SOLOSTAR U-100 INSULIN) 100 unit/mL (3 mL) inpn Comments:  
Reason for Stopping:   
   
 insulin aspart U-100 (NOVOLOG FLEXPEN U-100 INSULIN) 100 unit/mL (3 mL) inpn Comments:  
Reason for Stopping:   
   
 lisinopril-hydroCHLOROthiazide (PRINZIDE, ZESTORETIC) 20-12.5 mg per tablet Comments:  
Reason for Stopping:   
   
 tamsulosin (FLOMAX) 0.4 mg capsule Comments:  
Reason for Stopping:   
   
 simvastatin (ZOCOR) 40 mg tablet Comments:  
Reason for Stopping:   
   
 glyBURIDE-metFORMIN (GLUCOVANCE) 5-500 mg per tablet Comments:  
Reason for Stopping:   
   
 aspirin delayed-release 81 mg tablet Comments:  
Reason for Stopping:   
   
 ibuprofen 200 mg cap Comments:  
Reason for Stopping:   
   
  
 
 
Signed: 
Gregorio Valentino PA-C 
2/12/2020

## 2020-02-11 NOTE — PROGRESS NOTES
Problem: Self Care Deficits Care Plan (Adult) Goal: *Acute Goals and Plan of Care (Insert Text) Description 1. Patient will complete upper body bathing/dressing with SBA to improve participation with ADL tasks. 2. Patient will tolerate sitting edge of bed for 8 minutes to improve sitting tolerance for ADL. 3. Patient will complete rolling side to side in bed with supervision for positioning for ADLs. 4. Patient will attempt to stand within 3 visits to prepare for functional transfers. MET 5. Patient will complete grooming tasks with set-up to improve independence with ADL. 6. Patient will tolerate 30 minutes of OT activity with 2-3 rest breaks to improve activity tolerance. 7. Patient will complete functional transfers with minimal assistance to the chair/BSC. 8. Patient will complete functional mobility for household distances with minimal assistance. Timeframe: 7 visits Outcome: Progressing Towards Goal 
  
OCCUPATIONAL THERAPY: Daily Note and AM  
 2/11/2020 INPATIENT: OT Visit Days: 3 Payor: [facility] / Plan: [employer] / Product Type: [employer] Care Medicare /  
  
NAME/AGE/GENDER: He Mancera is a 69 y.o. male PRIMARY DIAGNOSIS:  Atherosclerosis of native coronary artery of native heart with unstable angina pectoris ([facility] [address] [I25.110] Nonrheumatic aortic valve stenosis [I35.0] CAD (coronary artery disease) [I25.10] Aortic valve stenosis [I35.0] S/P CABG x 3 S/P CABG x 3 Procedure(s) (LRB): ULTRASOUND on THinners (Bilateral) 8 Days Post-Op ICD-10: Treatment Diagnosis:  
 · Generalized Muscle Weakness (M62.81) · Other lack of cordination (R27.8) Precautions/Allergies: 
   Heparin; Amoxicillin; Keflex [cephalexin]; and Pcn [penicillins] ASSESSMENT:  
 
Mr. Iliana Ross presents s/p CABG x 3. Pt has ischemic digits on the hands and feet and wound vac to his L thigh.  Pt lives with his wife and states that typically he is modified independent with ADL and uses a cane for functional mobility. Pt denies any falls. Pt reports his wife has difficulty walking and he was helping her to get around the house. 2/11/2020 Pt presents up on [facility] upon arrival. Pt stood with CGA and a rolling walker and required total assist with bowel hygiene. Pt completed mobility to the recliner chair and stated that he was resting for 10 minutes. Therapist was able to get pt to do a few exercises with max encouragement. Pt finally stood again and completed functional mobility with a rolling walker and CGA with a chair follow. Pt returned to room and to the recliner chair and was left with posey on and belongings in reach. Needs encouragement to participate. Continue POC. This section established at most recent assessment PROBLEM LIST (Impairments causing functional limitations): 1. Decreased Strength 2. Decreased ADL/Functional Activities 3. Decreased Transfer Abilities 4. Decreased Ambulation Ability/Technique 5. Decreased Balance 6. Increased Pain 7. Decreased Activity Tolerance 8. Decreased Pacing Skills 9. Increased Fatigue 10. Increased Shortness of Breath 11. Decreased Flexibility/Joint Mobility 12. Decreased Knowledge of Precautions 13. Decreased Skin Integrity/Hygeine 14. Decreased Bond with Home Exercise Program 
15. Decreased Cognition INTERVENTIONS PLANNED: (Benefits and precautions of occupational therapy have been discussed with the patient.) 1. Activities of daily living training 2. Adaptive equipment training 3. Balance training 4. Clothing management 5. Cognitive training 6. Donning&doffing training 7. Neuromuscular re-eduation 8. Therapeutic activity 9. Therapeutic exercise TREATMENT PLAN: Frequency/Duration: Follow patient 3 times per week to address above goals. Rehabilitation Potential For Stated Goals: Good REHAB RECOMMENDATIONS (at time of discharge pending progress):   
Placement: It is my opinion, based on this patient's performance to date, that Mr. Jes Flores may benefit from intensive therapy at a [address] [place] [address] after discharge due to the functional deficits listed above that are likely to improve with skilled rehabilitation and concerns that he/she may be unsafe to be unsupervised at home due to risk of falls and further functional decline. Equipment: ? TBD   
    
 
 
 
OCCUPATIONAL PROFILE AND HISTORY:  
History of Present Injury/Illness (Reason for Referral): 
See H&P Past Medical History/Comorbidities:  
Mr. Jes Flores  has a past medical history of Arthritis, BPH (benign prostatic hyperplasia) (1/14/2013), CAD (coronary artery disease), DM type 2 (diabetes mellitus, type 2) ([facility] [address] (dx 2004), Dyspnea, Gout (1/14/2013), HLD (hyperlipidemia) (1/14/2013), HTN (hypertension), Morbid obesity ([facility] [address] (9/3/14), Psychiatric disorder, Rheumatic fever, Seasonal allergic rhinitis, Severe aortic valve stenosis, Thyroid disease, and Unspecified sleep apnea (2016). Mr. Jes Flores  has a past surgical history that includes hx tonsil and adenoidectomy; hx heart catheterization (12/23/2019); hx orthopaedic (Left); hx cataract removal (Bilateral, 2012); and ir insert tunl cvc w port over 5 years (2/4/2020). Social History/Living Environment:  
Home Environment: Private residence # Steps to Enter: 1 One/Two Story Residence: One story Living Alone: No 
Support Systems: Spouse/Significant Other/Partner Patient Expects to be Discharged to[de-identified] Private residence Current DME Used/Available at Home: Cane, straight, Shower chair Tub or Shower Type: Shower Prior Level of Function/Work/Activity: 
Pt lives with his wife and states that typically he is modified independent with ADL and uses a cane for functional mobility. Pt denies any falls.  Pt reports his wife has difficulty walking and he was helping her to get around the house. Personal Factors:   
      Sex:  male Age:  69 y.o. Past/Current Experience:  s/p CABG x 3 Overall Behavior:  anxious; demanding Other factors that influence how disability is experienced by the patient:  multiple co-morbidities Number of Personal Factors/Comorbidities that affect the Plan of Care: Extensive review of physical, cognitive, and psychosocial performance (3+):  HIGH COMPLEXITY ASSESSMENT OF OCCUPATIONAL PERFORMANCE[de-identified]  
Activities of Daily Living:  
Basic ADLs (From Assessment) Complex ADLs (From Assessment) Feeding: Stand-by assistance Oral Facial Hygiene/Grooming: Minimum assistance Bathing: Maximum assistance Upper Body Dressing: Moderate assistance Lower Body Dressing: Total assistance Toileting: Maximum assistance, Total assistance Instrumental ADL Meal Preparation: Total assistance Homemaking: Total assistance Grooming/Bathing/Dressing Activities of Daily Living Toileting Toileting Assistance: Total assistance(dependent) Bowel Hygiene: Total assistance (dependent) Bed/Mat Mobility Sit to Stand: Contact guard assistance Stand to Sit: Contact guard assistance Most Recent Physical Functioning:  
Gross Assessment: 
  
         
  
Posture: 
Posture (WDL): Exceptions to [facility] Posture Assessment: Forward head Balance: 
Sitting: Intact Sitting - Static: Good (unsupported) Sitting - Dynamic: Good (unsupported) Standing: Impaired Standing - Static: Good Standing - Dynamic : Fair Bed Mobility: 
  
Wheelchair Mobility: 
  
Transfers: 
Sit to Stand: Contact guard assistance Stand to Sit: Contact guard assistance Patient Vitals for the past 6 hrs: 
 BP SpO2 O2 Flow Rate (L/min) Pulse 02/11/20 0709 97/51 95 % 5 l/min 75  
02/11/20 0855  97 % 3 l/min   
02/11/20 1117 94/56 98 % 3 l/min 93  
02/11/20 1220  95 % 3 l/min  Mental Status Neurologic State: Alert Orientation Level: Oriented X4 Cognition: Appropriate decision making, Appropriate for age attention/concentration, Appropriate safety awareness, Follows commands Perception: Appears intact Perseveration: No perseveration noted Safety/Judgement: Fall prevention Physical Skills Involved: 1. Range of Motion 2. Balance 3. Strength 4. Activity Tolerance 5. Sensation 6. Fine Motor Control 7. Pain (acute) 8. Edema 9. Skin Integrity Cognitive Skills Affected (resulting in the inability to perform in a timely and safe manner): 1. Executive Function 2. Sustained Attention 3. Divided Attention Psychosocial Skills Affected: 1. Habits/Routines 2. Social Interaction 3. Emotional Regulation 4. Self-Awareness Number of elements that affect the Plan of Care: 5+:  HIGH COMPLEXITY CLINICAL DECISION MAKIN[address] [place] [id number] AM-PAC 6 Clicks Daily Activity Inpatient Short Form How much help from another person does the patient currently need. .. Total A Lot A Little None 1. Putting on and taking off regular lower body clothing? [x] 1   [] 2   [] 3   [] 4  
2. Bathing (including washing, rinsing, drying)? [] 1   [x] 2   [] 3   [] 4  
3. Toileting, which includes using toilet, bedpan or urinal?   [x] 1   [] 2   [] 3   [] 4  
4. Putting on and taking off regular upper body clothing? [] 1   [x] 2   [] 3   [] 4  
5. Taking care of personal grooming such as brushing teeth? [] 1   [x] 2   [] 3   [] 4  
6. Eating meals? [] 1   [] 2   [x] 3   [] 4  
© , Trustees of [address] [place] [id number], under license to [email or web address]. All rights reserved Score:  Initial:  11 Most Recent: X (Date: -- ) Interpretation of Tool:  Represents activities that are increasingly more difficult (i.e. Bed mobility, Transfers, Gait). Medical Necessity:    
· Patient demonstrates · good and fair ·  rehab potential due to higher previous functional level. Reason for Services/Other Comments: 
· Patient continues to require skilled intervention due to · Decreased independence with ADL/functional transfers · . Use of outcome tool(s) and clinical judgement create a POC that gives a: MODERATE COMPLEXITY  
 
 
 
TREATMENT:  
(In addition to Assessment/Re-Assessment sessions the following treatments were rendered) Pre-treatment Symptoms/Complaints:   
Pain: Initial:  
Pain Intensity 1: 0  Post Session:  0/10 Today's treatment session addressed Decreased Strength, Decreased ADL/Functional Activities, Decreased Transfer Abilities, Decreased Ambulation Ability/Technique, Decreased Balance, Increased Pain, Decreased Activity Tolerance, Increased Shortness of Breath, Decreased Flexibility/Joint Mobility, Edema/Girth, Decreased Knowledge of Precautions, Decreased Skin Integrity/Hygeine, Decreased [place] with Home Exercise Program, and Decreased Cognition to progress towards achieving goal(s) stated above. During this session,  Physical Therapy addressed  Balance to progress towards their discipline specific goal(s). Co-treatment was necessary to improve patient's ability to follow higher level commands and ability to increase activity demands. Therapeutic Exercise: (15 minutes):  Exercises per grid below to improve mobility and strength. Required maximal visual and verbal cues to promote proper body posture and promote proper body mechanics. Progressed resistance, range and repetitions as indicated. Date: 
2/11/2020 Date: 
 Date: Activity/Exercise Parameters Parameters Parameters Shoulder flexion/extension 10 reps with red theraband Shoulder horizontal add/abb 10 reps with red theraband Punches 10 reps with red theraband Therapeutic Activity: (15 minutes):   Therapeutic activities including Toilet transfers, toilet hygiene, and static/dynamic standing to improve mobility, strength and balance. Required minimal assist to promote static and dynamic balance in standing. Braces/Orthotics/Lines/Etc:  
· IV 
· O2 Device: Nasal cannula Treatment/Session Assessment:   
· Response to Treatment:  Needs encouragement · Interdisciplinary Collaboration:  
o Physical Therapy Assistant 
o Certified Occupational Therapy Assistant 
o Registered Nurse · After treatment position/precautions:  
o Up in chair 
o Bed/Chair-wheels locked 
o Call light within reach 
o RN notified 
o Nurse at bedside · Compliance with Program/Exercises: Will assess as treatment progresses. · Recommendations/Intent for next treatment session: \"Next visit will focus on advancements to more challenging activities and reduction in assistance provided\". Total Treatment Duration: OT Patient Time In/Time Out Time In: 2703 Time Out: 1108 Neri Abdul

## 2020-02-11 NOTE — PROGRESS NOTES
Bedside shift change report given to [address] [place] [address] [place] (oncoming nurse) by Felix Whitfield RN (offgoing nurse). Report included the following information SBAR, Kardex, Intake/Output, MAR, Recent Results and Cardiac Rhythm NSR.

## 2020-02-11 NOTE — PROGRESS NOTES
Bedside shift change report given to Joan Turner RN (oncoming nurse) by Linus Quiroga (offgoing nurse). Report included the following information SBAR, Kardex, Intake/Output, MAR, Recent Results and Cardiac Rhythm NSR.

## 2020-02-11 NOTE — PROGRESS NOTES
Pt may need temporary HD continued when he transition to SNF for STR. This CM faxed referral to [address] [facility] admissions department. Secured HD spot at [facility] [facility] [facility] in [facility] for TTS at 10:45am.  No additional CM needs at this time. Will continue to follow.

## 2020-02-11 NOTE — PROGRESS NOTES
Warfarin dosing per pharmacist 
 
Jenise Villalobos is a 69 y.o. male. Height: 5' 10\" (177.8 cm)    Weight: 114.5 kg (252 lb 6.8 oz) Indication:  AVR + Afib Goal INR:  2.5-3.5 Home dose:  New start Risk factors or significant drug interactions:  Amiodarone (last dose 2/5), Levofloxacin (added 2/9) Other anticoagulants:  Argatroban (dc'd 2/10) Daily Monitoring INR  INR Date   On Argatroban     Off Argatroban     Warfarin dose  HGB              Notes 2/4  1.4      2.5 mg  7.8   
2/5  2.3      2.5 mg  8.3 
2/6  2.5      2.5 mg  8.3 
2/7  2.6      5mg   --- 
2/8  2.6      5 mg  8 
2/9  3.3      5 mg  8.2  Levaquin started 2/10  6.3  3.7    HOLD  7.9  Argatroban stopped 2/11    2.8    2.5 mg  8.9 Pharmacy has been consulted to manage Mr. Alex Vazquez warfarin during this admission. Reminders: ? Argatroban falsely elevates the INR (typically INR is roughly double the true INR while on argatroban). ? If using argatroban to bridge to warfarin, the drip must overlap with warfarin therapy for at least 5 days, regardless of the INR value, to prevent recurrent thrombosis, skin necrosis or venous gangrene. ? When the INR while on argatroban and warfarin together reaches ? 4.0, stop the argatroban drip for 4-6 hours and check the true INR. ? If the true INR is > 2.0, the argatroban drip may be stopped and the patient can continue with just warfarin. (If have overlapped ? 5 days.) ? If the true INR is < 2.0, the argatroban drip should be resumed at the previous rate and will continue until true INR is > 2.0 Pt has received argatroban + warfarin over 5 days and true INR is greater than 2. Therefore argatroban can be dc'd and continue warfarin alone A/P:  Warfarin held last night due to elevated INR off of Argatroban. INR down to 2.8 today, so will resume warfarin at a lower dose of 2.5mg due to Levaquin interaction. Thank you, Mahin Wakefield, PharmD, BCPS 
 Clinical Pharmacist 
[phone]

## 2020-02-11 NOTE — PROGRESS NOTES
GOALS: (revised goals 2/8/20) 1. Patient will transfer supine to sit with modified independence within 5 treatment days 2. Patient will transfer sit to stand with supervision within 5 treatment days 3. Patient will ambulate ' with the r/walker with supervision within 5 treatment days 4. Patient will participate in BLE AROM exercises in supine, sitting and standing to improve functional strength and mobility within 5 treatment days. PHYSICAL THERAPY: Daily Note and PM 2/11/2020 INPATIENT: PT Visit Days : 4 Payor: Champ Quiroz / Plan: Jeanne Mack / Product Type: [employer] Medicare /   
  
NAME/AGE/GENDER: Louisa Fernandez is a 69 y.o. male PRIMARY DIAGNOSIS: Atherosclerosis of native coronary artery of native heart with unstable angina pectoris ([facility] [address] [I25.110] Nonrheumatic aortic valve stenosis [I35.0] CAD (coronary artery disease) [I25.10] Aortic valve stenosis [I35.0] S/P CABG x 3 S/P CABG x 3 Procedure(s) (LRB): ULTRASOUND on THinners (Bilateral) 8 Days Post-Op ICD-10: Treatment Diagnosis:  
 · Other abnormalities of gait and mobility (R26.89) Precaution/Allergies: 
Heparin; Amoxicillin; Keflex [cephalexin]; and Pcn [penicillins] ASSESSMENT:  
 
Mr. Zaheer Salas underwent above surgery on 1/10/20 and was only recently extubated. He lives with his wife and ambulates independently with cane or RW at baseline. Patient presents sitting on the [facility] upon contact. Co treat with OT. Sit to stand with min assist to the walker. Patient took a few steps to the recliner and needed a rest break then the patient was able to gait train further. Gait with the rolling walker x 20 feet with slow lluvia. Patient has decreased step length bilaterally. Patient required one sitting rest break, but was able to return to the recliner. Patient is positioned for comfort with needs within reach and alarm intact.   His mobility is getting better. Made good progress today however the patient needed encouragement from staff. Mr. Genie Clark appears to be making slow progress towards his goals. Needs motivation and cues for safety. Will need rehab at discharge. Continue PT efforts. PM note:  Patient is agreeable and ready to participate. Sit to stand to the walker as we are about to walk the patient states that he needs to use the bathroom. Transfers to the [facility], stood to be cleaned then gait trained x 20 feet with one sitting rest break. Patient is returned to the room tot he bed. Bed mobility is with supervision. Patient states that he \"can't breathe\" however his saturations are good, he is instructed in pursed lip breathing. Patient requires additional time for all task completion. Patient is left in the bed with the RN at bedside. Progress demonstrated. Will continue PT efforts. This section established at most recent assessment PROBLEM LIST (Impairments causing functional limitations): 1. Decreased Strength 2. Decreased ADL/Functional Activities 3. Decreased Transfer Abilities 4. Decreased Ambulation Ability/Technique 5. Decreased Balance 6. Decreased Activity Tolerance 7. Increased Fatigue 8. Increased Shortness of Breath 9. Decreased Knowledge of Precautions INTERVENTIONS PLANNED: (Benefits and precautions of physical therapy have been discussed with the patient.) 1. Balance Exercise 2. Bed Mobility 3. Neuromuscular Re-education/Strengthening 4. Therapeutic Activites 5. Therapeutic Exercise/Strengthening 6. Transfer Training 7. education TREATMENT PLAN: Frequency/Duration: twice daily for duration of hospital stay Rehabilitation Potential For Stated Goals: Good REHAB RECOMMENDATIONS (at time of discharge pending progress):   
Placement:  
It is my opinion, based on this patient's performance to date, that Mr. Genie Clark may benefit from intensive therapy at a [address] [place] [address] after discharge due to the functional deficits listed above that are likely to improve with skilled rehabilitation and severe debility. Equipment:  
? tbd  
? None at this time HISTORY:  
History of Present Injury/Illness (Reason for Referral): 
Patient admitted for above surgery. Past Medical History/Comorbidities:  
Mr. Joanna Estrella  has a past medical history of Arthritis, BPH (benign prostatic hyperplasia) (1/14/2013), CAD (coronary artery disease), DM type 2 (diabetes mellitus, type 2) ([facility] [address] (dx 2004), Dyspnea, Gout (1/14/2013), HLD (hyperlipidemia) (1/14/2013), HTN (hypertension), Morbid obesity ([facility] [address] (9/3/14), Psychiatric disorder, Rheumatic fever, Seasonal allergic rhinitis, Severe aortic valve stenosis, Thyroid disease, and Unspecified sleep apnea (2016). Mr. Joanna Estrella  has a past surgical history that includes hx tonsil and adenoidectomy; hx heart catheterization (12/23/2019); hx orthopaedic (Left); hx cataract removal (Bilateral, 2012); and ir insert tunl cvc w port over 5 years (2/4/2020). Social History/Living Environment:  
Home Environment: Private residence # Steps to Enter: 1 One/Two Story Residence: One story Living Alone: No 
Support Systems: Spouse/Significant Other/Partner Patient Expects to be Discharged to[de-identified] Private residence Current DME Used/Available at Home: Cane, straight, Shower chair Tub or Shower Type: Shower Prior Level of Function/Work/Activity: 
 
He lives with his wife and ambulates independently with cane or RW at baseline. Number of Personal Factors/Comorbidities that affect the Plan of Care: 3+: HIGH COMPLEXITY EXAMINATION:  
Most Recent Physical Functioning:  
Gross Assessment: 
  
         
  
Posture: 
  
Balance: 
Sitting: Intact Sitting - Static: Good (unsupported) Sitting - Dynamic: Good (unsupported) Standing: Impaired Standing - Static: Good Standing - Dynamic : Fair Bed Mobility: 
Rolling: Supervision Sit to Supine: Supervision Scooting: Supervision Wheelchair Mobility: 
  
Transfers: 
Sit to Stand: Contact guard assistance Stand to Sit: Contact guard assistance Gait: 
  
Base of Support: Narrowed Speed/Federica: Shuffled; Slow Step Length: Left shortened;Right shortened Distance (ft): 20 Feet (ft) Assistive Device: Walker, rolling Ambulation - Level of Assistance: Minimal assistance Body Structures Involved: 1. Heart 2. Lungs 3. Muscles Body Functions Affected: 1. Cardio 2. Respiratory 3. Neuromusculoskeletal 
4. Movement Related Activities and Participation Affected: 1. Mobility 2. Self Care 3. Domestic Life 4. Community, Social and [place] Number of elements that affect the Plan of Care: 4+: HIGH COMPLEXITY CLINICAL PRESENTATION:  
Presentation: Evolving clinical presentation with changing clinical characteristics: MODERATE COMPLEXITY CLINICAL DECISION MAKIN[address] [place] [id number] AM-PAC 6 Clicks Basic Mobility Inpatient Short Form How much difficulty does the patient currently have. .. Unable A Lot A Little None 1. Turning over in bed (including adjusting bedclothes, sheets and blankets)? [x] 1   [] 2   [] 3   [] 4  
2. Sitting down on and standing up from a chair with arms ( e.g., wheelchair, bedside commode, etc.)   [x] 1   [] 2   [] 3   [] 4  
3. Moving from lying on back to sitting on the side of the bed? [x] 1   [] 2   [] 3   [] 4 How much help from another person does the patient currently need. .. Total A Lot A Little None 4. Moving to and from a bed to a chair (including a wheelchair)? [x] 1   [] 2   [] 3   [] 4  
5. Need to walk in hospital room? [x] 1   [] 2   [] 3   [] 4  
6. Climbing 3-5 steps with a railing? [x] 1   [] 2   [] 3   [] 4  
© , Trustees of [address] [place] [address], under license to [employer]. All rights reserved Score:  Initial: 6 Most Recent: X (Date: -- ) Interpretation of Tool:  Represents activities that are increasingly more difficult (i.e. Bed mobility, Transfers, Gait). Medical Necessity:    
· Patient is expected to demonstrate progress in  
· strength, range of motion, balance, coordination, functional technique, and activity tolerance ·  to  
· decrease assistance required with all functional mobility · . Reason for Services/Other Comments: 
· Patient continues to require skilled intervention due to · medical complications and patient unable to attend/participate in therapy as expected · . Use of outcome tool(s) and clinical judgement create a POC that gives a: Questionable prediction of patient's progress: MODERATE COMPLEXITY  
  
 
 
 
TREATMENT:  
(In addition to Assessment/Re-Assessment sessions the following treatments were rendered) Pre-treatment Symptoms/Complaints: \"I'm ready\" Pain: Initial: 0 FLACC Pain Intensity 1: 0 Pain Location 1: Buttocks  Post Session:  0/10 Therapeutic Activity: (    23 minutes): Therapeutic activities including transfers, balance activities, bed mobility and gait training to improve mobility, strength, balance and coordination. Required contact guard assist to min assist     to safey with functional mobility activities . Today's treatment session addressed Decreased Ambulation Ability/Technique to progress towards achieving goal(s) 0. During this session, Occupational Therapy addressed Activity tolerance to progress towards their discipline specific goal(s). Co-treatment was necessary to improve patient's ability to increase activity demands. Gait Training (  mins):  Gait training to improve and/or restore physical functioning as related to mobility, strength and balance. Ambulated 20 Feet (ft) with Minimal assistance using a Walker, rolling and minimal   related to their sit to stand and standing  to promote proper body alignment, promote proper body posture and promote proper body mechanics. DATE: 1/26/20 1/29/20 2/3/20 2/6/20 2/8/20 Straight leg raise     10 B Hip abduct/ adduct X5 AAB    10 B Heel slides  X3 AB  10 x 2 B Hip external/ internal rotation Ankle dorsiflexion/ plantarflexion X5 AB 25 10 x 2 B 25 10 B Sitting unsupported x5'       
LAQ  25  25 10 B   
marching  25  25 10 B Sit to stand     5 times Key:  A=active, AA=active assisted, P=passive, B=bilaterally, R=right, L=left Braces/Orthotics/Lines/Etc:  
· O2 Device: Heated, Hi flow nasal cannula 2L with O2 Treatment/Session Assessment:   
· Response to Treatment:. 
· Interdisciplinary Collaboration:  
o Physical Therapy Assistant 
o Registered Nurse · After treatment position/precautions:  
o Supine in bed 
o Bed/Chair-wheels locked 
o Bed in low position 
o Call light within reach 
o Nurse at bedside · Compliance with Program/Exercises: Compliant all of the time · Recommendations/Intent for next treatment session: \"Next visit will focus on advancements to more challenging activities and reduction in assistance provided\". Total Treatment Duration: PT Patient Time In/Time Out Time In: 3113 Time Out: 1257 Delores Tolentino, PTA

## 2020-02-11 NOTE — PROGRESS NOTES
GOALS: (revised goals 2/8/20) 1. Patient will transfer supine to sit with modified independence within 5 treatment days 2. Patient will transfer sit to stand with supervision within 5 treatment days 3. Patient will ambulate ' with the r/walker with supervision within 5 treatment days 4. Patient will participate in BLE AROM exercises in supine, sitting and standing to improve functional strength and mobility within 5 treatment days. PHYSICAL THERAPY: Daily Note and AM 2/11/2020 INPATIENT: PT Visit Days : 4 Payor: Trupti Alberts / Plan: Rosalie Sara / Product Type: [employer] Medicare /   
  
NAME/AGE/GENDER: Ibeth Courtney is a 69 y.o. male PRIMARY DIAGNOSIS: Atherosclerosis of native coronary artery of native heart with unstable angina pectoris ([facility] [address] [I25.110] Nonrheumatic aortic valve stenosis [I35.0] CAD (coronary artery disease) [I25.10] Aortic valve stenosis [I35.0] S/P CABG x 3 S/P CABG x 3 Procedure(s) (LRB): ULTRASOUND on THinners (Bilateral) 8 Days Post-Op ICD-10: Treatment Diagnosis:  
 · Other abnormalities of gait and mobility (R26.89) Precaution/Allergies: 
Heparin; Amoxicillin; Keflex [cephalexin]; and Pcn [penicillins] ASSESSMENT:  
 
Mr. Clinton Smiley underwent above surgery on 1/10/20 and was only recently extubated. He lives with his wife and ambulates independently with cane or RW at baseline. Patient presents sitting on the [facility] upon contact. Co treat with OT. Sit to stand with min assist to the walker. Patient took a few steps to the recliner and needed a rest break then the patient was able to gait train further. Gait with the rolling walker x 20 feet with slow lluvia. Patient has decreased step length bilaterally. Patient required one sitting rest break, but was able to return to the recliner. Patient is positioned for comfort with needs within reach and alarm intact.   His mobility is getting better. Made good progress today however the patient needed encouragement from staff. Mr. Manjit Rodriguez appears to be making slow progress towards his goals. Needs motivation and cues for safety. Will need rehab at discharge. Continue PT efforts. This section established at most recent assessment PROBLEM LIST (Impairments causing functional limitations): 1. Decreased Strength 2. Decreased ADL/Functional Activities 3. Decreased Transfer Abilities 4. Decreased Ambulation Ability/Technique 5. Decreased Balance 6. Decreased Activity Tolerance 7. Increased Fatigue 8. Increased Shortness of Breath 9. Decreased Knowledge of Precautions INTERVENTIONS PLANNED: (Benefits and precautions of physical therapy have been discussed with the patient.) 1. Balance Exercise 2. Bed Mobility 3. Neuromuscular Re-education/Strengthening 4. Therapeutic Activites 5. Therapeutic Exercise/Strengthening 6. Transfer Training 7. education TREATMENT PLAN: Frequency/Duration: twice daily for duration of hospital stay Rehabilitation Potential For Stated Goals: Good REHAB RECOMMENDATIONS (at time of discharge pending progress):   
Placement: It is my opinion, based on this patient's performance to date, that Mr. Manjit Rodriguez may benefit from intensive therapy at a [address] [place] [address] after discharge due to the functional deficits listed above that are likely to improve with skilled rehabilitation and severe debility. Equipment:  
? tbd  
? None at this time HISTORY:  
History of Present Injury/Illness (Reason for Referral): 
Patient admitted for above surgery.  
Past Medical History/Comorbidities:  
Mr. Manjit Rodriguez  has a past medical history of Arthritis, BPH (benign prostatic hyperplasia) (1/14/2013), CAD (coronary artery disease), DM type 2 (diabetes mellitus, type 2) ([facility] [address] (dx 2004), Dyspnea, Gout (1/14/2013), HLD (hyperlipidemia) (1/14/2013), HTN (hypertension), Morbid obesity ([facility] [address] (9/3/14), Psychiatric disorder, Rheumatic fever, Seasonal allergic rhinitis, Severe aortic valve stenosis, Thyroid disease, and Unspecified sleep apnea (2016). Mr. Winford Phoenix  has a past surgical history that includes hx tonsil and adenoidectomy; hx heart catheterization (12/23/2019); hx orthopaedic (Left); hx cataract removal (Bilateral, 2012); and ir insert tunl cvc w port over 5 years (2/4/2020). Social History/Living Environment:  
Home Environment: Private residence # Steps to Enter: 1 One/Two Story Residence: One story Living Alone: No 
Support Systems: Spouse/Significant Other/Partner Patient Expects to be Discharged to[de-identified] Private residence Current DME Used/Available at Home: Cane, straight, Shower chair Tub or Shower Type: Shower Prior Level of Function/Work/Activity: 
 
He lives with his wife and ambulates independently with cane or RW at baseline. Number of Personal Factors/Comorbidities that affect the Plan of Care: 3+: HIGH COMPLEXITY EXAMINATION:  
Most Recent Physical Functioning:  
Gross Assessment: 
  
         
  
Posture: 
  
Balance: 
Sitting: Intact Sitting - Static: Good (unsupported) Sitting - Dynamic: Good (unsupported) Standing: Impaired Standing - Static: Good Standing - Dynamic : Fair Bed Mobility: 
  
Wheelchair Mobility: 
  
Transfers: 
Sit to Stand: Contact guard assistance Stand to Sit: Contact guard assistance Gait: 
  
Base of Support: Narrowed Speed/Federica: Shuffled; Slow Step Length: Left shortened;Right shortened Distance (ft): 20 Feet (ft) Assistive Device: Walker, rolling Ambulation - Level of Assistance: Minimal assistance Body Structures Involved: 1. Heart 2. Lungs 3. Muscles Body Functions Affected: 1. Cardio 2. Respiratory 3. Neuromusculoskeletal 
4. Movement Related Activities and Participation Affected: 1. Mobility 2. Self Care 3. Domestic Life 4. Community, Social and [facility] [address] Number of elements that affect the Plan of Care: 4+: HIGH COMPLEXITY CLINICAL PRESENTATION:  
Presentation: Evolving clinical presentation with changing clinical characteristics: MODERATE COMPLEXITY CLINICAL DECISION MAKIN [facility] [address] [address] AM-PAC 6 Clicks Basic Mobility Inpatient Short Form How much difficulty does the patient currently have. .. Unable A Lot A Little None 1. Turning over in bed (including adjusting bedclothes, sheets and blankets)? [x] 1   [] 2   [] 3   [] 4  
2. Sitting down on and standing up from a chair with arms ( e.g., wheelchair, bedside commode, etc.)   [x] 1   [] 2   [] 3   [] 4  
3. Moving from lying on back to sitting on the side of the bed? [x] 1   [] 2   [] 3   [] 4 How much help from another person does the patient currently need. .. Total A Lot A Little None 4. Moving to and from a bed to a chair (including a wheelchair)? [x] 1   [] 2   [] 3   [] 4  
5. Need to walk in hospital room? [x] 1   [] 2   [] 3   [] 4  
6. Climbing 3-5 steps with a railing? [x] 1   [] 2   [] 3   [] 4  
© , Trustees of [address] [facility] [address] [address], under license to [employer]. All rights reserved Score:  Initial: 6 Most Recent: X (Date: -- ) Interpretation of Tool:  Represents activities that are increasingly more difficult (i.e. Bed mobility, Transfers, Gait). Medical Necessity:    
· Patient is expected to demonstrate progress in  
· strength, range of motion, balance, coordination, functional technique, and activity tolerance ·  to  
· decrease assistance required with all functional mobility · . Reason for Services/Other Comments: 
· Patient continues to require skilled intervention due to · medical complications and patient unable to attend/participate in therapy as expected · . Use of outcome tool(s) and clinical judgement create a POC that gives a: Questionable prediction of patient's progress: MODERATE COMPLEXITY  
  
 
 
 
TREATMENT:  
 (In addition to Assessment/Re-Assessment sessions the following treatments were rendered) Pre-treatment Symptoms/Complaints: \"I need 5 more minutes \" 
Pain: Initial: 0 FLACC Pain Intensity 1: 0 Pain Location 1: Buttocks  Post Session:  0/10 Therapeutic Activity: (    30 minutes): Therapeutic activities including transfers, balance activities, bed mobility and gait training to improve mobility, strength, balance and coordination. Required contact guard assist to min assist     to safey with functional mobility activities . Today's treatment session addressed Decreased Ambulation Ability/Technique to progress towards achieving goal(s) 0. During this session, Occupational Therapy addressed Activity tolerance to progress towards their discipline specific goal(s). Co-treatment was necessary to improve patient's ability to increase activity demands. Gait Training (  mins):  Gait training to improve and/or restore physical functioning as related to mobility, strength and balance. Ambulated 20 Feet (ft) with Minimal assistance using a Walker, rolling and minimal   related to their sit to stand and standing  to promote proper body alignment, promote proper body posture and promote proper body mechanics. DATE: 1/26/20 1/29/20 2/3/20 2/6/20 2/8/20 Straight leg raise     10 B Hip abduct/ adduct X5 AAB    10 B Heel slides  X3 AB  10 x 2 B Hip external/ internal rotation Ankle dorsiflexion/ plantarflexion X5 AB 25 10 x 2 B 25 10 B Sitting unsupported x5'       
LAQ  25  25 10 B   
marching  25  25 10 B Sit to stand     5 times Key:  A=active, AA=active assisted, P=passive, B=bilaterally, R=right, L=left Braces/Orthotics/Lines/Etc:  
· O2 Device: Heated, Hi flow nasal cannula 5L with O2 Treatment/Session Assessment:   
· Response to Treatment:. 
· Interdisciplinary Collaboration:  
o Physical Therapy Assistant 
o Registered Nurse · After treatment position/precautions:  
o Up in chair 
o Bed alarm/tab alert on 
o Bed/Chair-wheels locked 
o Call light within reach 
o RN notified · Compliance with Program/Exercises: Compliant all of the time · Recommendations/Intent for next treatment session: \"Next visit will focus on advancements to more challenging activities and reduction in assistance provided\". Total Treatment Duration: PT Patient Time In/Time Out Time In: 3051 Time Out: 1108 Zack Tubbs PTA

## 2020-02-11 NOTE — PROGRESS NOTES
Dressing to bilat toes removed, skin cleaned with CHG foam. Dead skin removed from toes but some still intact. NTG paste applied to toes and wrapped with xeroform gauze dry 4x4 applied and wrapped with bandage radha. @6984 discussed degloving with Shelby Brown RN [facility] plans to evaluate tomorrow.

## 2020-02-12 NOTE — PROGRESS NOTES
TRANSFER OUT- DIALYSIS Hemodialysis treatment completed without complications. Patient stable and VS WNL  /57  P 64   
 
 1.3 Kgs removed. Flushed both ports with 10 mL of NS.  CVC dressing clean, dry, and intact, tego caps intact, bilateral lumens wrapped with 4x4 gauze. Patient to room 2232 after dialysis.

## 2020-02-12 NOTE — DISCHARGE INSTRUCTIONS
All patients with prosthetic valves are considered among those at highest risk for endocarditis (infection of a heart valve). It is important to maintain good oral health care with regular use of a manual or powered toothbrush, dental floss or other plaque-removing devices. The risk of an infection on the heart valve is generally the highest with dental procedures which includes routine dental cleaning. You will need to take antibiotics before most dental procedures or oral surgery. You will also need antibiotics before procedures such as a having tonsils removed or procedures involving the lungs. If you require surgery for a skin infection, you will also need antibiotics to prevent infection of a heart valve. Please contact your cardiologist or primary care provider for further instructions. 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 Your doctor did the surgery through a cut, called an incision, in your chest.  You will feel tired and sore for the first few weeks after surgery. You may have some brief, sharp pains on either side of your chest. Your chest, shoulders, and upper back may ache. The incision in your chest may be sore or swollen. These symptoms usually get better after 4 to 6 weeks. You will probably be able to do many of your usual activities after 4 to 6 weeks. But for at least 6 weeks, you will not be able to lift heavy objects or do activities that strain your chest or upper arm muscles. At first you may notice that you get tired easily and need to rest often. It may take 1 to 2 months to get your energy back. Some people find that they are more emotional after this surgery. You may cry easily or show emotion in ways that are unusual for you. This is common and may last for up to a year. Some people get depressed after this surgery.  Talk with your doctor if you have sadness that continues or you are concerned about how you are feeling. Treatment and other support can help you feel better. Even though you have a new aortic valve, it is still important to eat a heart-healthy diet, get regular exercise, not smoke, take your heart medicines, and reduce stress. Your doctor may recommend that you work with a nurse, a dietitian, and a physical therapist to make these changes. This is sometimes called cardiac rehabilitation. This care sheet gives you a general idea about how long it will take for you to recover. But each person recovers at a different pace. Follow the steps below to get better as quickly as possible. How can you care for yourself at home? Activity    · Rest when you feel tired. Getting enough sleep will help you recover. Try to sleep on your back while you heal. If your breastbone (sternum) was cut, healing usually takes about 4 to 6 weeks.     · Try to walk each day. Start by walking a little more than you did the day before. Bit by bit, increase the amount you walk. Walking boosts blood flow and helps prevent pneumonia and constipation.     · Avoid strenuous activities, such as bicycle riding, jogging, weight lifting, or heavy aerobic exercise, until your doctor says it is okay.     · For 3 months, avoid activities that strain your chest or upper arm muscles. This includes pushing a  or vacuum, mopping floors, or swinging a golf club or tennis racquet.     · For at least 6 weeks, avoid lifting anything that would make you strain. This may include a child, heavy grocery bags and milk containers, a heavy briefcase or backpack, or cat litter or dog food bags.     · Hold a pillow firmly over your chest incision when you cough or take deep breaths. This will support your chest and reduce your pain.     · Do breathing exercises at home as instructed by your doctor.  This will help prevent pneumonia.     · Ask your doctor when you can drive again.     · You will probably need to take 4 to 12 weeks off from work. It depends on the type of work you do and how you feel.     · You may shower as usual. Pat the incision dry. Do not take a bath for the first 3 weeks, or until your doctor tells you it is okay.     · Do not swim or use a hot tub for at least 1 month, or until your doctor says it is okay.     · Ask your doctor when it is okay for you to have sex. Diet    · Eat a heart-healthy, low-salt diet. If you have not been eating this way, talk to your doctor. You also may want to talk to a dietitian. A dietitian can help you plan meals and learn about healthy foods.     · Drink plenty of fluids (unless your doctor tells you not to).     · You may notice that your bowel movements are not regular right after your surgery. This is common. Try to avoid constipation and straining with bowel movements. You may want to take a fiber supplement every day. If you have not had a bowel movement after a couple of days, ask your doctor about taking a mild laxative. Medicines    · Your doctor will tell you if and when you can restart your medicines. He or she will also give you instructions about taking any new medicines.     · If you take blood thinners, such as warfarin (Coumadin), clopidogrel (Plavix), or aspirin, be sure to talk to your doctor. He or she will tell you if and when to start taking those medicines again. Make sure that you understand exactly what your doctor wants you to do.     · Be safe with medicines. Take your medicines exactly as prescribed. Call your doctor if you think you are having a problem with your medicine.     · Take pain medicines exactly as directed. ? If the doctor gave you a prescription medicine for pain, take it as prescribed. ? If you are not taking a prescription pain medicine, ask your doctor if you can take an over-the-counter medicine.   ? Do not take aspirin, ibuprofen (Advil, Motrin), naproxen (Aleve), or other nonsteroidal anti-inflammatory drugs (NSAIDs) unless your doctor says it is okay.     · If you think your pain medicine is making you sick to your stomach:  ? Take your medicine after meals (unless your doctor has told you not to). ? Ask your doctor for a different pain medicine.     · If your doctor prescribed antibiotics, take them as directed. Do not stop taking them just because you feel better. You need to take the full course of antibiotics.     · Your doctor may give you a blood thinner to prevent blood clots. If you take a blood thinner, be sure you get instructions about how to take your medicine safely. Blood thinners can cause serious bleeding problems. Incision care    · If you have strips of tape on the incision the doctor made, leave the tape on for a week or until it falls off.     · Wash the area daily with warm, soapy water and pat it dry. Don't use hydrogen peroxide or alcohol, which may delay healing. You may cover the area with a gauze bandage if it weeps or rubs against clothing. Change the bandage every day.     · Keep the area clean and dry. Other instructions    · Keep track of your weight. Weigh yourself every day at the same time of day, on the same scale, in the same amount of clothing. A sudden increase in weight can be a sign of a problem with your heart. Tell your doctor if you suddenly gain weight, such as 3 pounds or more in 2 to 3 days.     · Be sure to tell all your doctors and your dentist that you have an artificial aortic valve. This is important, because you may need to take antibiotics before certain procedures to prevent infection. Follow-up care is a key part of your treatment and safety. Be sure to make and go to all appointments, and call your doctor if you are having problems. It's also a good idea to know your test results and keep a list of the medicines you take. When should you call for help? Call 911 anytime you think you may need emergency care.  For example, call if:    · You passed out (lost consciousness).     · You have trouble breathing.     · You have severe pain in your chest.     · You have symptoms of a stroke. These may include:  ? Sudden numbness, tingling, weakness, or loss of movement in your face, arm, or leg, especially on only one side of your body. ? Sudden vision changes. ? Sudden trouble speaking. ? Sudden confusion or trouble understanding simple statements. ? Sudden problems with walking or balance. ? A sudden, severe headache that is different from past headaches.     · You have symptoms of a heart attack. These may include:  ? Chest pain or pressure, or a strange feeling in the chest.  ? Sweating. ? Shortness of breath. ? Nausea or vomiting. ? Pain, pressure, or a strange feeling in the back, neck, jaw, or upper belly or in one or both shoulders or arms. ? Lightheadedness or sudden weakness. ? A fast or irregular heartbeat.    After you call  911, the  may tell you to chew 1 adult-strength or 2 to 4 low-dose aspirin. Wait for an ambulance. Do not try to drive yourself.   Call your doctor now or seek immediate medical care if:    · You have pain that does not get better after you take pain medicine.     · You have loose stitches, or your incision comes open.     · Bright red blood has soaked through the bandage over your incision.     · You have signs of infection, such as:  ? Increased pain, swelling, warmth, or redness. ? Red streaks leading from the incision. ? Pus draining from the incision. ? A fever.     · You have signs of a blood clot, such as:  ? Pain in your calf, back of the knee, thigh, or groin. ? Redness and swelling in your leg or groin.     · You have new or changed symptoms of heart failure, such as:  ? New or increased shortness of breath. ? New or worse swelling in your legs, ankles, or feet. ? Sudden weight gain, such as more than 2 to 3 pounds in a day or 5 pounds in a week.  (Your doctor may suggest a different range of weight gain.)  ? Feeling dizzy or lightheaded or like you may faint. ? Feeling so tired or weak that you cannot do your usual activities. ? Not sleeping well. Shortness of breath wakes you at night. You need extra pillows to prop yourself up to breathe easier.    Watch closely for changes in your health, and be sure to contact your doctor if you have any problems. Where can you learn more? Go to http://aditi-gigi.info/. Enter Q232 in the search box to learn more about \"Aortic Valve Replacement Surgery: What to Expect at Home. \"  Current as of: April 9, 2019  Content Version: 12.2  © 0796-8204 [employer]. Care instructions adapted under license by [employer] (which disclaims liability or warranty for this information). If you have questions about a medical condition or this instruction, always ask your healthcare professional. [place] [address] any warranty or liability for your use of this information. Coronary Artery Bypass Graft: What to Expect at Home  Your Recovery    Coronary artery bypass graft (CABG) is surgery to treat coronary artery disease. The surgery helps blood make a detour, or bypass, around one or more narrowed or blocked coronary arteries. Coronary arteries are the blood vessels that bring blood to the heart. Your doctor did the surgery through a cut, called an incision, in your chest.  You will feel tired and sore for the first few weeks after surgery. You may have some brief, sharp pains on either side of your chest. Your chest, shoulders, and upper back may ache. The incision in your chest and the area where the healthy vein was taken may be sore or swollen. These symptoms usually get better after 4 to 6 weeks. You will probably be able to do many of your usual activities after 4 to 6 weeks. But for 2 to 3 months you will not be able to lift heavy objects or do activities that strain your chest or upper arm muscles.  At first you may notice that you get tired easily and need to rest often. It may take 1 to 2 months to get your energy back. Some people find that they are more emotional after this surgery. You may cry easily or show emotion in ways that are unusual for you. This is common and may last for up to a year. Some people get depressed after CABG surgery. Talk with your doctor if you have sadness that continues or you are concerned about how you are feeling. Treatment and other support can help you feel better. Even though the surgery may improve your symptoms, you will still need to make changes in your lifestyle to lower your risk of a heart attack or stroke. It will be important to eat a heart-healthy diet, get regular exercise, not smoke, take your heart medicines, and reduce stress. You will likely start a cardiac rehabilitation (rehab) program in the hospital. You will continue with this rehab program after you go home to help you recover and prevent problems with your heart. Talk to your doctor about whether rehab is right for you. This care sheet gives you a general idea about how long it will take for you to recover. But each person recovers at a different pace. Follow the steps below to get better as quickly as possible. How can you care for yourself at home? Activity    · Rest when you feel tired. Getting enough sleep will help you recover. Try to sleep on your back for 4 to 6 weeks while your breastbone (sternum) heals. This usually takes about 4 to 6 weeks.     · Try to walk each day. Start by walking a little more than you did the day before. Bit by bit, increase the amount you walk. Walking boosts blood flow and helps prevent pneumonia and constipation.     · Avoid strenuous activities, such as bicycle riding, jogging, weight lifting, or heavy aerobic exercise, until your doctor says it is okay.     · For 3 months, avoid activities that strain your chest or upper arm muscles.  This includes pushing a  or vacuum, mopping floors, or swinging a golf club or tennis racquet.     · For 2 to 3 months, avoid lifting anything that would make you strain. This may include a child, heavy grocery bags and milk containers, a heavy briefcase or backpack, or cat litter or dog food bags.     · Hold a pillow firmly over your chest incision when you cough or take deep breaths. This will support your chest and reduce your pain.     · Do breathing exercises at home as instructed by your doctor. This will help prevent pneumonia.     · Ask your doctor when you can drive again.     · You will probably need to take 4 to 12 weeks off from work. It depends on the type of work you do and how you feel.     · You may shower as usual. Pat the incision dry. Do not take a bath for the first 3 weeks, or until your doctor tells you it is okay.     · Do not swim or use a hot tub for at least 1 month, or until your doctor says it is okay.     · Ask your doctor when it is okay for you to have sex. Diet    · Eat a heart-healthy diet. If you have not been eating this way, talk to your doctor. You also may want to talk to a dietitian. A dietitian can help you learn about healthy foods.     · Drink plenty of fluids (unless your doctor tells you not to).     · You may notice that your bowel movements are not regular right after your surgery. This is common. Try to avoid constipation and straining with bowel movements. You may want to take a fiber supplement every day. If you have not had a bowel movement after a couple of days, ask your doctor about taking a mild laxative. Medicines    · Your doctor will tell you if and when you can restart your medicines. He or she will also give you instructions about taking any new medicines.     · If you take blood thinners, such as warfarin (Coumadin), clopidogrel (Plavix), or aspirin, be sure to talk to your doctor. He or she will tell you if and when to start taking those medicines again.  Make sure that you understand exactly what your doctor wants you to do.     · Your doctor may give you medicines to prevent blood clots, keep your heartbeat steady, and lower your blood pressure and cholesterol. Take your medicines exactly as prescribed. Call your doctor if you think you are having a problem with your medicine.     · Be safe with medicines. Take pain medicines exactly as directed. ? If the doctor gave you a prescription medicine for pain, take it as prescribed. ? If you are not taking a prescription pain medicine, ask your doctor if you can take an over-the-counter medicine. ? Do not take aspirin, ibuprofen (Advil, Motrin), naproxen (Aleve), or other nonsteroidal anti-inflammatory drugs (NSAIDs) unless your doctor says it is okay.     · If you think your pain medicine is making you sick to your stomach:  ? Take your medicine after meals (unless your doctor has told you not to). ? Ask your doctor for a different pain medicine.     · If your doctor prescribed antibiotics, take them as directed. Do not stop taking them just because you feel better. You need to take the full course of antibiotics. Incision care    · If you have strips of tape on the incisions the doctor made, leave the tape on for a week or until it falls off.     · Wash the area daily with warm, soapy water, and pat it dry. Don't use hydrogen peroxide or alcohol, which can slow healing. You may cover the area with a gauze bandage if it weeps or rubs against clothing. Change the bandage every day.     · Keep the area clean and dry.     · Do not use any creams, lotions, powders, ointments, or oils unless your doctor tells you it is okay.     · If you have an incision in your leg:  ? Wear support stockings on your legs during the day for the first 2 weeks. You can take the stockings off at night while you sleep. ? Raise your legs above the level of your heart whenever you lay down for the first 4 to 6 weeks.    Other instructions    · Keep track of your weight. Weigh yourself every day at the same time of day, on the same scale, in the same amount of clothing. A sudden increase in weight can be a sign of a problem with your heart. Tell your doctor if you suddenly gain weight, such as 3 pounds or more in 2 to 3 days.     · Do not smoke. Smoking can make it harder for you to recover and it will raise the chances of your arteries narrowing again. If you need help quitting, talk to your doctor about stop-smoking programs and medicines. These can increase your chances of quitting for good. Follow-up care is a key part of your treatment and safety. Be sure to make and go to all appointments, and call your doctor if you are having problems. It's also a good idea to know your test results and keep a list of the medicines you take. When should you call for help? Call 911 anytime you think you may need emergency care. For example, call if:    · You passed out (lost consciousness).     · You have severe trouble breathing.     · You have sudden chest pain and shortness of breath, or you cough up blood.     · You have severe pain in your chest.     · You have symptoms of a heart attack. These may include:  ? Chest pain or pressure, or a strange feeling in the chest.  ? Sweating. ? Shortness of breath. ? Nausea or vomiting. ? Pain, pressure, or a strange feeling in the back, neck, jaw, or upper belly or in one or both shoulders or arms. ? Lightheadedness or sudden weakness. ? A fast or irregular heartbeat. After you call 911, the  may tell you to chew 1 adult-strength or 2 to 4 low-dose aspirin. Wait for an ambulance.  Do not try to drive yourself.     · You have angina symptoms (such as chest pain or pressure) that do not go away with rest or are not getting better within 5 minutes after you take a dose of nitroglycerin.    Call your doctor now or seek immediate medical care if:    · You have pain that does not get better after you take pain medicine.     · You have a fever over 100°F.     · You have loose stitches, or your incision comes open.     · Bright red blood has soaked through the bandage over your incision.     · You have signs of infection, such as:  ? Increased pain, swelling, warmth, or redness. ? Red streaks leading from the incision. ? Pus draining from the incision. ? Swollen lymph nodes in your neck, armpits, or groin. ? A fever.     · You have signs of a blood clot in a leg. If you had a vein removed from your leg, you may have tenderness and swelling while your leg heals. But signs of a blood clot may be in a different part of your leg and may include:  ? Pain in your calf, back of the knee, thigh, or groin. ? Redness and swelling in your leg or groin.     · Your heartbeat feels very fast or slow, skips beats, or flutters.     · You are dizzy or lightheaded, or you feel like you may faint.     · You have new or increased shortness of breath.    Watch closely for changes in your health, and be sure to contact your doctor if:    · You gain weight suddenly, such as 3 pounds or more in 2 to 3 days.     · You have increased swelling in your legs, ankles, or feet.     · You have any concerns about your incision.     · You feel very sad or have other signs of depression, such as trouble sleeping or eating.     · You have questions about diet, exercise, quitting smoking, or stress reduction after surgery. Where can you learn more? Go to http://aditi-gigi.info/. Enter F759 in the search box to learn more about \"Coronary Artery Bypass Graft: What to Expect at Home. \"  Current as of: April 9, 2019  Content Version: 12.2  © 2745-6422 [employer]. Care instructions adapted under license by [employer] (which disclaims liability or warranty for this information).  If you have questions about a medical condition or this instruction, always ask your healthcare professional. [address] any warranty or liability for your use of this information. Reducing Heart Attack Risk With Daily Medicine: Care Instructions  Your Care Instructions    If you are at risk for a heart attack, there are many medicines that can reduce your risk. These include:  · ACE inhibitors or ARBs. These are types of blood pressure medicines. They can reduce the risk of heart attacks and strokes if you are at high risk. · Statins and other cholesterol medicines. These lower cholesterol. They can also reduce the risk of a stroke. · Aspirin and other antiplatelets. If you are at high risk of a heart attack or stroke and at low risk of bleeding problems, your doctor might talk to you about taking an aspirin every day to lower your risk. Only take daily aspirin if your doctor recommends it because it's not right for everybody. · Beta-blocker medicines. These are a type of blood pressure and heart medicine. They can reduce the chance of early death if you have had a heart attack. All medicines can cause side effects. So it is important to understand the pros and cons of any medicine you take. It is also important to take your medicines exactly as your doctor tells you to. Follow-up care is a key part of your treatment and safety. Be sure to make and go to all appointments, and call your doctor if you are having problems. It's also a good idea to know your test results and keep a list of the medicines you take. ACE inhibitors  ACE (angiotensin-converting enzyme) inhibitors are used for three main reasons. They lower blood pressure, protect the kidneys, and prevent heart attacks and strokes. Examples include benazepril (Lotensin), lisinopril (Prinivil, Zestril), and ramipril (Altace). An angiotensin II receptor blocker (ARB) may be used instead of an ACE inhibitor. ARBs help you in the same ways as ACE inhibitors. Examples include candesartan (Atacand), irbesartan (Avapro), and losartan (Cozaar).   Before you start taking an ACE inhibitor or an ARB, make sure your doctor knows if:  · You are taking a water pill (diuretic). · You are taking potassium pills or using salt substitutes. · You are pregnant or breastfeeding. · You have had a kidney transplant or other kidney problems. ACE inhibitors and ARBs can cause side effects. Call your doctor right away if you have:  · Trouble breathing. · Swelling in your face, head, neck, or tongue. · Dizziness or lightheadedness. · A dry cough. Statins  Statins lower cholesterol. Examples include atorvastatin (Lipitor), lovastatin (Mevacor), pravastatin (Pravachol), and simvastatin (Zocor). Before you start taking a statin, make sure your doctor knows if:  · You have had a kidney transplant or other kidney problems. · You have liver disease. · You take any other prescription medicine, over-the-counter medicine, vitamins, supplements, or herbal remedies. · You are pregnant or breastfeeding. Statins can cause side effects. Call your doctor right away if you have:  · New, severe muscle aches. · Brown urine. Aspirin  If you are at high risk of a heart attack, your doctor might talk to you about taking an aspirin every day to lower your risk. A heart attack occurs when a blood vessel in the heart gets blocked. When this happens, oxygen can't get to the heart muscle, and part of the heart dies. Aspirin can help prevent blood clots that can block the blood vessels. But talk to your doctor before you start taking aspirin every day. Daily aspirin isn't right for most people. This is because it can cause serious bleeding. You and your doctor can decide if aspirin is a good choice for you based on your risk of a heart attack and your risk of serious bleeding. You may not be able to use aspirin if you:  · Have asthma or certain other health conditions. · Have an ulcer or other stomach problem. · Take some other medicine (called a blood thinner) that prevents blood clots.   · Are allergic to aspirin. Your doctor may recommend that you take one low-dose aspirin (81 mg) tablet each day, with a meal and a full glass of water. Before having a surgery or procedure, tell your doctor or dentist that you take aspirin. He or she will tell you if you should stop taking aspirin beforehand. Make sure that you understand exactly what your doctor wants you to do. Aspirin can cause side effects. Call your doctor right away if you have:  · Unusual bleeding or bruising. · Nausea, vomiting, or heartburn. · Black or bloody stools. Beta-blockers  Beta-blockers are used for three main reasons. They lower blood pressure, relieve angina symptoms (such as chest pain or pressure), and reduce the chances of a second heart attack. They include atenolol (Tenormin), carvedilol (Coreg), and metoprolol (Lopressor). Before you start taking a beta-blocker, make sure your doctor knows if you have:  · Severe asthma or frequent asthma attacks. · A very slow pulse (less than 55 beats a minute). Beta-blockers can cause side effects. Call your doctor right away if you have:  · Wheezing or trouble breathing. · Dizziness or lightheadedness. · Asthma that gets worse. When should you call for help? Watch closely for changes in your health, and be sure to contact your doctor if you have any problems. Where can you learn more? Go to http://aditi-gigi.info/. Enter R428 in the search box to learn more about \"Reducing Heart Attack Risk With Daily Medicine: Care Instructions. \"  Current as of: April 9, 2019  Content Version: 12.2  © 2182-8917 [employer]. Care instructions adapted under license by [employer] (which disclaims liability or warranty for this information). If you have questions about a medical condition or this instruction, always ask your healthcare professional. [address] any warranty or liability for your use of this information.      Heart-Healthy Diet: Care Instructions  Your Care Instructions    A heart-healthy diet has lots of vegetables, fruits, nuts, beans, and whole grains, and is low in salt. It limits foods that are high in saturated fat, such as meats, cheeses, and fried foods. It may be hard to change your diet, but even small changes can lower your risk of heart attack and heart disease. Follow-up care is a key part of your treatment and safety. Be sure to make and go to all appointments, and call your doctor if you are having problems. It's also a good idea to know your test results and keep a list of the medicines you take. How can you care for yourself at home? Watch your portions  · Learn what a serving is. A \"serving\" and a \"portion\" are not always the same thing. Make sure that you are not eating larger portions than are recommended. For example, a serving of pasta is ½ cup. A serving size of meat is 2 to 3 ounces. A 3-ounce serving is about the size of a deck of cards. Measure serving sizes until you are good at [place]" them. Keep in mind that restaurants often serve portions that are 2 or 3 times the size of one serving. · To keep your energy level up and keep you from feeling hungry, eat often but in smaller portions. · Eat only the number of calories you need to stay at a healthy weight. If you need to lose weight, eat fewer calories than your body burns (through exercise and other physical activity). Eat more fruits and vegetables  · Eat a variety of fruit and vegetables every day. Dark green, deep orange, red, or yellow fruits and vegetables are especially good for you. Examples include spinach, carrots, peaches, and berries. · Keep carrots, celery, and other veggies handy for snacks. Buy fruit that is in season and store it where you can see it so that you will be tempted to eat it. · Cook dishes that have a lot of veggies in them, such as stir-fries and soups.   Limit saturated and trans fat  · Read food labels, and try to avoid saturated and trans fats. They increase your risk of heart disease. Trans fat is found in many processed foods such as cookies and crackers. · Use olive or canola oil when you cook. Try cholesterol-lowering spreads, such as Benecol or Take Control. · Bake, broil, grill, or steam foods instead of frying them. · Choose lean meats instead of high-fat meats such as hot dogs and sausages. Cut off all visible fat when you prepare meat. · Eat fish, skinless poultry, and meat alternatives such as soy products instead of high-fat meats. Soy products, such as tofu, may be especially good for your heart. · Choose low-fat or fat-free milk and dairy products. Eat fish  · Eat at least two servings of fish a week. Certain fish, such as salmon and tuna, contain omega-3 fatty acids, which may help reduce your risk of heart attack. Eat foods high in fiber  · Eat a variety of grain products every day. Include whole-grain foods that have lots of fiber and nutrients. Examples of whole-grain foods include oats, whole wheat bread, and brown rice. · Buy whole-grain breads and cereals, instead of white bread or pastries. Limit salt and sodium  · Limit how much salt and sodium you eat to help lower your blood pressure. · Taste food before you salt it. Add only a little salt when you think you need it. With time, your taste buds will adjust to less salt. · Eat fewer snack items, fast foods, and other high-salt, processed foods. Check food labels for the amount of sodium in packaged foods. · Choose low-sodium versions of canned goods (such as soups, vegetables, and beans). Limit sugar  · Limit drinks and foods with added sugar. These include candy, desserts, and soda pop. Limit alcohol  · Limit alcohol to no more than 2 drinks a day for men and 1 drink a day for women. Too much alcohol can cause health problems. When should you call for help?   Watch closely for changes in your health, and be sure to contact your doctor if:    · You would like help planning heart-healthy meals. Where can you learn more? Go to http://aditi-gigi.info/. Enter V137 in the search box to learn more about \"Heart-Healthy Diet: Care Instructions. \"  Current as of: April 9, 2019  Content Version: 12.2  © 4519-6602 [employer]. Care instructions adapted under license by [employer] (which disclaims liability or warranty for this information). If you have questions about a medical condition or this instruction, always ask your healthcare professional. [address] any warranty or liability for your use of this information.

## 2020-02-12 NOTE — DIALYSIS
TRANSFER IN - DIALYSIS Received patient in dialysis unit  from [facility] (unit) for ordered procedure. Consent verified for renal replacement therapy. Patient alert and vital signs stable. /65 P 83 Hemodialysis initiated using Left CVC. Aspirated and flushed both ports without difficulty. Dressing clean, dry and intact. Machine settings per MD order. Heparin 0 unit bolus and 0 units/hr. Will monitor during treatment.

## 2020-02-12 NOTE — PROGRESS NOTES
Pt with discharge orders this day. Pt approved by insurance to transition to SNF for STR. This CM discussed with pt and he remains agreeable with plan. This CM left VM for son as well. Transport arranged via [employer] for [address]  SNF packet complete. No additional CM needs at this time. Milestones met. Care Management Interventions PCP Verified by CM: Yes(Rex Ennis) Mode of Transport at Discharge: BLS Transition of Care Consult (CM Consult): Discharge Planning, SNF Discharge Durable Medical Equipment: No 
Physical Therapy Consult: Yes Occupational Therapy Consult: Yes Speech Therapy Consult: No 
Current Support Network: Own Home, Lives with Spouse Confirm Follow Up Transport: Other (see comment)([place]) The Plan for Transition of Care is Related to the Following Treatment Goals : SNF STR The Patient and/or Patient Representative was Provided with a Choice of Provider and Agrees with the Discharge Plan?: Yes Name of the Patient Representative Who was Provided with a Choice of Provider and Agrees with the Discharge Plan: Mr. Zaheer Salas and son(N/A) Freedom of Choice List was Provided with Basic Dialogue that Supports the Patient's Individualized Plan of Care/Goals, Treatment Preferences and Shares the Quality Data Associated with the Providers?: Yes [employer] Information Provided?: No 
Discharge Location Discharge Placement: Skilled nursing facility

## 2020-02-12 NOTE — PROGRESS NOTES
TRANSFER - OUT REPORT: 
 
Verbal report given to Kartik Baig LPN at (name) on Beth Christianson  being transferred to [facility] (unit) for routine progression of care Report consisted of patients Situation, Background, Assessment and  
Recommendations(SBAR). Information from the following report(s) SBAR, Kardex, Procedure Summary, Intake/Output, Recent Results, Med Rec Status and Cardiac Rhythm NSR was reviewed with the receiving nurse. Lines:    
 
Opportunity for questions and clarification was provided. Patient transported with: 
 [facility] and all personal belongings from room.

## 2020-02-12 NOTE — PROGRESS NOTES
Report received from [facility] Telle[facility]. Patient has already been transported to dialysis. Will perform assessment upon return to floor.

## 2020-02-12 NOTE — PROGRESS NOTES
[facility] Progress Note Follow-Up on: DEJUAN/CKD Patient seen and examined on HD, dialyzing via left  Qb, UF 2600 complaints of feeling depressed over health situation but remains hopeful, tolerating UF. Labs and chart reviewed- no sign of renal recovery ROS: 
Gen - no fever, no chills, appetite ok CV - no chest pain, no palpitation Lung - + exertional shortness of breath, no cough Abd - no tenderness, no nausea/vomiting, no diarrhea Ext - + edema- improved Exam: 
Vitals:  
 02/11/20 2119 02/11/20 2343 02/12/20 6671 02/12/20 1318 BP:  105/51 109/53 122/65 Pulse:  83 78 83 Resp:  18 20 Temp:  98.7 °F (37.1 °C) 97.9 °F (36.6 °C) SpO2: 93% 95% 90% Weight:   115.7 kg (255 lb) Height:      
 
 
 
Intake/Output Summary (Last 24 hours) at 2/12/2020 0741 Last data filed at 2/11/2020 1950 Gross per 24 hour Intake 920 ml Output 0 ml Net 920 ml Wt Readings from Last 3 Encounters:  
02/12/20 115.7 kg (255 lb) 01/08/20 136.6 kg (301 lb 4 oz) 12/23/19 138.8 kg (306 lb) GEN - in no distress, alert and oriented CV - regular rate, S1, S2,  no rub Lung - clear bilaterally, no wheezes Abd - soft, nontender, + BS Ext - trace BLE edema Access - left TCC- intact Recent Labs  
  02/12/20 
0400 02/11/20 
0333 02/10/20 
6878 02/10/20 
0340 WBC 9.5 10.1  --  10.0 HGB 8.7* 8.9*  --  7.9*  
HCT 27.4* 28.7*  --  25.4*  
 176  --  184 INR 2.6 2.8 3.7* 6.3* Recent Labs  
  02/12/20 
0400 02/11/20 
0333 02/10/20 
0340  139 135* K 4.4 4.1 4.2  104 98 CO2 27 29 25 BUN 57* 40* 66* CREA 5.85* 4.69* 6.78* CA 7.9* 7.8* 7.8*  
GLU 93 129* 136* Assessment / Plan: 
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 
  DM type 2 (diabetes mellitus, type 2) ([facility] (1/14/2013) Aortic valve stenosis (1/10/2020) CAD (coronary artery disease) (1/10/2020) Acute hypoxemic respiratory failure ([address] (1/10/2020) S/P AVR (1/10/2020) Acute renal failure (ARF) ([address] (1/11/2020) Atrial fibrillation ([address] (1/13/2020) Shock ([address] (1/15/2020) HIT (heparin-induced thrombocytopenia) ([address] (1/23/2020) Pleural effusion (2/3/2020) 1. DEJUAN/CKD - Previous baseline Cr around 1.8-1.9 
- No signs of renal recovery 
-seen on HD, tolerating UF, catheter functioning well 
-Outpt HD for TTS  [place] pt can be discharged from renal standpoint 2. S/p CABG 3. Volume overload - overall improving 4. 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 Anemia monitoring

## 2020-02-12 NOTE — PROGRESS NOTES
Bedside shift change report given to Louisa Brown RN (oncoming nurse) by Kartik RN (offgoing nurse). Report included the following information SBAR, Kardex, Intake/Output, MAR, Recent Results and Cardiac Rhythm NSR.

## 2020-02-12 NOTE — PROGRESS NOTES
PT note:  Treatment deferred as the patient is off the floor at dialysis. Will continue PT efforts.   Kailey Morrissey, PTA

## 2020-02-12 NOTE — WOUND CARE
Pt seen for follow up on bilateral toes. Dressings removed bc patient is about to get a shower prior to discharge. Noted some degloving but no skin loose enough to remove at this time, states that some may loosen up after shower but told primary RN to continue with xeroform dressings as ordered. Please call for any further questions.

## 2020-02-12 NOTE — PROGRESS NOTES
[facility]ane Mak Admission Date: 1/10/2020 Daily Progress Note: 2/12/2020 The patient's chart is reviewed and the patient is discussed with the staff. The patient's chart is reviewed and the patient is discussed with the staff.   
CABG x 3 and AVR on 1/10.  Slow to improve. Had small ptx that resolved without intervention. He has developed DEJUAN on CKD - now on dialysis.  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 Subjective: On 3L NC On argatroban for HIT Seen during HD- complains of SOB, anxiety Also still had diarrhea last night Current Facility-Administered Medications Medication Dose Route Frequency  warfarin (COUMADIN) tablet 2.5 mg  2.5 mg Oral QPM  
 nitroglycerin (NITROBID) 2 % ointment 6 Inch  6 Inch Topical TID  levoFLOXacin (LEVAQUIN) tablet 500 mg  500 mg Oral Q48H  
 0.9% sodium chloride infusion 250 mL  250 mL IntraVENous PRN  
 insulin glargine (LANTUS) injection 28 Units  28 Units SubCUTAneous DAILY  insulin lispro (HUMALOG) injection 5 Units  5 Units SubCUTAneous TIDAC  mirtazapine (REMERON) tablet 15 mg  15 mg Oral QHS  albuterol (PROVENTIL VENTOLIN) nebulizer solution 2.5 mg  2.5 mg Nebulization QID RT  
 insulin glargine (LANTUS) injection 18 Units  18 Units SubCUTAneous QHS  ALPRAZolam (XANAX) tablet 0.25 mg  0.25 mg Oral QID PRN  
 loperamide (IMODIUM) capsule 2 mg  2 mg Oral Q4H PRN  
 insulin lispro (HUMALOG) injection   SubCUTAneous AC&HS  
 busPIRone (BUSPAR) tablet 10 mg  10 mg Oral TID  oxyCODONE-acetaminophen (PERCOCET) 5-325 mg per tablet 1 Tab  1 Tab Per NG tube Q4H PRN  
 traMADol (ULTRAM) tablet 50 mg  50 mg Oral Q6H PRN  
 epoetin maritza-epbx (RETACRIT) 14,000 Units combo injection  14,000 Units SubCUTAneous Q7D Review of Systems Constitutional: negative for fever, chills, sweats Cardiovascular: negative for chest pain, palpitations, syncope, edema Gastrointestinal:  negative for dysphagia, reflux, vomiting, diarrhea, abdominal pain, or melena Neurologic:  negative for focal weakness, numbness, headache Objective:  
 
Vitals:  
 02/11/20 2119 02/11/20 2343 02/12/20 5550 02/12/20 4092 BP:  105/51 109/53 122/65 Pulse:  83 78 83 Resp:  18 20 Temp:  98.7 °F (37.1 °C) 97.9 °F (36.6 °C) SpO2: 93% 95% 90% Weight:   255 lb (115.7 kg) Height:      
 
 
 
Intake/Output Summary (Last 24 hours) at 2/12/2020 1511 Last data filed at 2/11/2020 1950 Gross per 24 hour Intake 920 ml Output 0 ml Net 920 ml Physical Exam:  
Constitution:  the patient is well developed and in no acute distress EENMT:  Sclera clear, pupils equal, oral mucosa moist 
Respiratory: coarse Cardiovascular:  RRR without M,G,R 
Gastrointestinal: soft and non-tender; with positive bowel sounds. Musculoskeletal: warm without cyanosis. There is plus 1  lower extremity edema. Skin:  no jaundice or rashes, healing surgical  wounds Neurologic: no gross neuro deficits Psychiatric:  alert and oriented x 3 CXR:  
 
 
LAB Recent Labs  
  02/12/20 
7914 02/11/20 
2109 02/11/20 
1606 02/11/20 
1118 02/11/20 
5946 GLUCPOC 89 136* 248* 242* 109* Recent Labs  
  02/12/20 
0400 02/11/20 
0333 02/10/20 
3223 02/10/20 
0340 WBC 9.5 10.1  --  10.0 HGB 8.7* 8.9*  --  7.9*  
HCT 27.4* 28.7*  --  25.4*  
 176  --  184 INR 2.6 2.8 3.7* 6.3* Recent Labs  
  02/12/20 
0400 02/11/20 
0333 02/10/20 
0340  139 135* K 4.4 4.1 4.2  104 98 CO2 27 29 25 GLU 93 129* 136* BUN 57* 40* 66* CREA 5.85* 4.69* 6.78* CA 7.9* 7.8* 7.8* No results for input(s): PH, PCO2, PO2, HCO3, PHI, PCO2I, PO2I, HCO3I in the last 72 hours. No results for input(s): LCAD, LAC in the last 72 hours. Assessment:  (Medical Decision Making) Hospital Problems  Date Reviewed: 2/9/2020 Codes Class Noted POA  Pleural effusion ICD-10-CM: J90 
 ICD-9-CM: 511.9  2/3/2020 Unknown HIT (heparin-induced thrombocytopenia) ([facility]) ICD-10-CM: O84.49 ICD-9-CM: 289.84  1/23/2020 Unknown Shock ([facility] ICD-10-CM: R57.9 ICD-9-CM: 785.50  1/15/2020 Unknown Atrial fibrillation [facility]) ICD-10-CM: I48.91 
ICD-9-CM: 427.31  1/13/2020 Unknown Acute renal failure (ARF) ([facility]) ICD-10-CM: N17.9 ICD-9-CM: 584.9  1/11/2020 Unknown Aortic valve stenosis ICD-10-CM: I35.0 ICD-9-CM: 424.1  1/10/2020 Unknown CAD (coronary artery disease) ICD-10-CM: I25.10 ICD-9-CM: 414.00  1/10/2020 Unknown Acute hypoxemic respiratory failure ([facility] ICD-10-CM: J96.01 
ICD-9-CM: 518.81  1/10/2020 * (Principal) S/P CABG x 3 ICD-10-CM: Z95.1 ICD-9-CM: V45.81  1/10/2020 Unknown S/P AVR ICD-10-CM: Z95.2 ICD-9-CM: V43.3  1/10/2020 Unknown DM type 2 (diabetes mellitus, type 2) ([facility]) ICD-10-CM: E11.9 ICD-9-CM: 250.00  1/14/2013 Yes Plan:  (Medical Decision Making) --Levaquin D 4 
- continue to wean 02, IS 
- PT Recheck CXR in AM 
- HD per renal 
 
More than 50% of the time documented was spent in face-to-face contact with the patient and in the care of the patient on the floor/unit where the patient is located.  
 
Tera Espino MD

## 2020-02-13 PROBLEM — J96.00 ACUTE RESPIRATORY FAILURE (HCC): Status: ACTIVE | Noted: 2020-01-01

## 2020-02-13 NOTE — ROUTINE PROCESS
TRANSFER - OUT REPORT: 
 
Verbal report given to 3rd floor rn(name) on Ángel [place]  being transferred to tele(unit) for routine progression of care Report consisted of patients Situation, Background, Assessment and  
Recommendations(SBAR). Information from the following report(s) SBAR, ED Summary, [facility] and Recent Results was reviewed with the receiving nurse. Lines:  
Peripheral IV 02/13/20 Left Antecubital (Active) Site Assessment Clean, dry, & intact 2/13/2020  3:47 PM  
Phlebitis Assessment 0 2/13/2020  3:47 PM  
Infiltration Assessment 0 2/13/2020  3:47 PM  
Dressing Status Clean, dry, & intact 2/13/2020  3:47 PM  
   
Peripheral IV 02/13/20 Left; Outer Antecubital (Active) Site Assessment Clean, dry, & intact 2/13/2020  3:47 PM  
Phlebitis Assessment 0 2/13/2020  3:47 PM  
Infiltration Assessment 0 2/13/2020  3:47 PM  
Dressing Status Clean, dry, & intact 2/13/2020  3:47 PM  
  
 
Opportunity for questions and clarification was provided. Patient transported with: 
 O2 @ 4 liters

## 2020-02-13 NOTE — H&P
History & Physcial  
NAME:  Radha Hess Age:  69 y.o. 
:   1945 MRN:   [id number] PCP: Nay Hernandez MD 
Treatment Team: Attending Provider: Miguel Calle MD; Primary Nurse: Eddy Ospina RN; Nurse Practitioner: Jas Connors NP 
HPI:  
Mr. Stuart Acuña is a 70 yo male with PMH of DM II, HTN, CAD s/p CABG, s/p aortic valve repair, JOAQUIM on bipap, multiple DVTs on coumadin, new HD pt, HIT +, necrosis of toes/fingers from levophed who presented from HD with c/o acute sudden onset of SOB. Was DC 20 from CABG and aortic valve replacement complicated by CKD requiring HD, HIT +, DVTs, wound left thigh on wound vac, pneumonia DC on levaquin Q 48 hours last dose 20. He was DC on 2 L O2 however has been increased to 4L O2 via NC at STR yesterday when he arrived. Today he was at HD and reports dizziness with sitting up and acute onset of SOB. Son at bedside and states pt was doing ok since DC yesterday until today at HD. INR 2.4. CXR showed improving interstitial edema, unchanged left basilar opacity and left pleural effusion. Pt given lasix in ED. Pt is SOB when talking in short sentences. Denies CP, n/v.  Has had diarrhea however is not new since hospitalization. Results summary of Diagnostic Studies/Procedures copied from within [facility] EMR: 
  
CXR Results  (Last 48 hours) 20 1555  XR CHEST PORT Final result Impression:  IMPRESSION:  
   
1. Improving interstitial edema. 2. Unchanged left basilar opacity and left pleural effusion. 3. Unchanged enlargement of the cardiac silhouette. VOICE DICTATED BY: Dr. Chani Reese Narrative:  EXAMINATION: CHEST RADIOGRAPH 2020 3:55 PM  
   
ACCESSION NUMBER: [id number] INDICATION: shob COMPARISON: Chest x-ray 2020 TECHNIQUE: A single AP view of the chest was obtained.    
   
FINDINGS:   
   
 Support Lines and Tubes: Unchanged cardiac pacemaker positioning. Unchanged left  
central venous catheter positioning. Cardiac Silhouette: Unchanged enlargement of the cardiac silhouette. Lungs: Unchanged left basilar opacity, moderate left pleural effusion. Improving  
interstitial edema. Pleura: No pneumothorax. Osseous Structures: Prior median sternotomy. Upper Abdomen: Unremarkable. Complete ROS done and is as stated in HPI or otherwise negative Past Medical History:  
Diagnosis Date  Arthritis  BPH (benign prostatic hyperplasia) 1/14/2013  CAD (coronary artery disease)  DM type 2 (diabetes mellitus, type 2) ([facility] dx 2004 Type 2, avg fbs , recognizes hypo at 67, last HA1C was 9.2 on 5/2014  Dyspnea   
 at times, Uses Albuterol inhaler when needed (last used first of aug 2014)  Gout 1/14/2013  HLD (hyperlipidemia) 1/14/2013  
 HTN (hypertension)   
 controlled with meds  Morbid obesity ([facility] 9/3/14 BMI 41.7  Psychiatric disorder   
 anxiety, controlled with buspar  Rheumatic fever   
 as a child  Seasonal allergic rhinitis   
 treated with Allegra  Severe aortic valve stenosis   
 echo 09/11/19 EF 60-65%- mild to moderate regurgitation  Thyroid disease   
 hx- no longer takes synthroid  Unspecified sleep apnea 2016  
 bi pap Past Surgical History:  
Procedure Laterality Date  HX CATARACT REMOVAL Bilateral 2012  HX HEART CATHETERIZATION  12/23/2019  HX ORTHOPAEDIC Left   
 carpal tunnel  HX TONSIL AND ADENOIDECTOMY  IR INSERT TUNL CVC W PORT OVER 5 YEARS  2/4/2020 Allergies Allergen Reactions  Heparin Other (comments) HIT antibody test strongly positive on 1/17/2020. SHARON drawn 1/18/2020 and resulted positive on 1/21/2020  Amoxicillin Rash  Keflex [Cephalexin] Rash  Pcn [Penicillins] Other (comments) \"felt closed in\". No rash Social History Tobacco Use  Smoking status: Never Smoker  Smokeless tobacco: Never Used Substance Use Topics  Alcohol use: Yes Alcohol/week: 0.0 standard drinks Comment: rarely Family History Problem Relation Age of Onset  Lung Disease Mother   
     copd  Cancer Father   
     lympnode cancer  No Known Problems Brother  Cancer Other   
     fmh lymphoma  Diabetes Other   
     fmhx  Diabetes Maternal Grandfather Objective:  
 
Visit Vitals /74 Pulse 86 Temp 97.7 °F (36.5 °C) Resp 27 SpO2 100% Temp (24hrs), Av.7 °F (36.5 °C), Min:97.7 °F (36.5 °C), Max:97.7 °F (36.5 °C) Oxygen Therapy O2 Sat (%): 100 % (20 1520) Pulse via Oximetry: 90 beats per minute (20 1520) Physical Exam: 
General:    Alert, cooperative, SOB when talking in short sentences, appears stated age. Head:   Normocephalic, without obvious abnormality, atraumatic. Nose:  Nares normal. No drainage or sinus tenderness. Lungs:   Diminished bases. Otherwise CTA bilaterally. Resp labored Heart:  S1S2 present with +murmur. No rubs or gallops. 2+ LE edema, unchanged per pt Abdomen:   Soft, non-tender. Not distended. Bowel sounds normal. No masses Extremities: Wound vac to left inner thigh. Necrosis to fingers and toes post high dose pressors Skin:     Not jaundiced. Surgical incision midsternal C/D/I, well approximated, non drainage Neurologic: Alert and oriented X3. No focal deficits Data Review:  
Recent Results (from the past 24 hour(s)) EKG, 12 LEAD, INITIAL Collection Time: 20  3:31 PM  
Result Value Ref Range Ventricular Rate 91 BPM  
 Atrial Rate 91 BPM  
 P-R Interval 180 ms QRS Duration 112 ms  
 Q-T Interval 368 ms QTC Calculation (Bezet) 452 ms Calculated P Axis 28 degrees Calculated R Axis -6 degrees Calculated T Axis 88 degrees Diagnosis    
  !! AGE AND GENDER SPECIFIC ECG ANALYSIS !! Normal sinus rhythm Nonspecific T wave abnormality Abnormal ECG When compared with ECG of 19-JAN-2020 09:53, Left bundle branch block is no longer Present Confirmed by Cuauhtemoc Barrett MD (), [facility] ([id number]) on 2/13/2020 5:02:25 PM 
  
CBC WITH AUTOMATED DIFF Collection Time: 02/13/20  3:44 PM  
Result Value Ref Range WBC 10.1 4.3 - 11.1 K/uL  
 RBC 3.36 (L) 4.23 - 5.6 M/uL HGB 9.8 (L) 13.6 - 17.2 g/dL HCT 31.8 (L) 41.1 - 50.3 % MCV 94.6 79.6 - 97.8 FL  
 MCH 29.2 26.1 - 32.9 PG  
 MCHC 30.8 (L) 31.4 - 35.0 g/dL  
 RDW 14.5 11.9 - 14.6 % PLATELET 791 (L) 518 - 450 K/uL MPV 10.0 9.4 - 12.3 FL ABSOLUTE NRBC 0.00 0.0 - 0.2 K/uL  
 DF AUTOMATED NEUTROPHILS 77 43 - 78 % LYMPHOCYTES 14 13 - 44 % MONOCYTES 6 4.0 - 12.0 % EOSINOPHILS 2 0.5 - 7.8 % BASOPHILS 0 0.0 - 2.0 % IMMATURE GRANULOCYTES 1 0.0 - 5.0 %  
 ABS. NEUTROPHILS 7.7 1.7 - 8.2 K/UL  
 ABS. LYMPHOCYTES 1.4 0.5 - 4.6 K/UL  
 ABS. MONOCYTES 0.6 0.1 - 1.3 K/UL  
 ABS. EOSINOPHILS 0.2 0.0 - 0.8 K/UL  
 ABS. BASOPHILS 0.0 0.0 - 0.2 K/UL  
 ABS. IMM. GRANS. 0.1 0.0 - 0.5 K/UL METABOLIC PANEL, COMPREHENSIVE Collection Time: 02/13/20  3:44 PM  
Result Value Ref Range Sodium 137 136 - 145 mmol/L Potassium 4.1 3.5 - 5.1 mmol/L Chloride 100 98 - 107 mmol/L  
 CO2 29 21 - 32 mmol/L Anion gap 8 7 - 16 mmol/L Glucose 155 (H) 65 - 100 mg/dL BUN 30 (H) 8 - 23 MG/DL Creatinine 3.78 (H) 0.8 - 1.5 MG/DL  
 GFR est AA 20 (L) >60 ml/min/1.73m2 GFR est non-AA 17 (L) >60 ml/min/1.73m2 Calcium 8.1 (L) 8.3 - 10.4 MG/DL Bilirubin, total 0.5 0.2 - 1.1 MG/DL  
 ALT (SGPT) 45 12 - 65 U/L  
 AST (SGOT) 43 (H) 15 - 37 U/L Alk. phosphatase 125 50 - 136 U/L Protein, total 6.6 6.3 - 8.2 g/dL Albumin 2.1 (L) 3.2 - 4.6 g/dL Globulin 4.5 (H) 2.3 - 3.5 g/dL A-G Ratio 0.5 (L) 1.2 - 3.5    
TROPONIN I Collection Time: 02/13/20  3:44 PM  
Result Value Ref Range  Troponin-I, Qt. <0.02 (L) 0.02 - 0.05 NG/ML  
 PROTHROMBIN TIME + INR Collection Time: 02/13/20  3:44 PM  
Result Value Ref Range Prothrombin time 26.7 (H) 12.0 - 14.7 sec INR 2.4 PROCALCITONIN Collection Time: 02/13/20  3:44 PM  
Result Value Ref Range Procalcitonin 0.78 ng/mL LACTIC ACID Collection Time: 02/13/20  3:44 PM  
Result Value Ref Range Lactic acid 2.0 0.4 - 2.0 MMOL/L  
NT-PRO BNP Collection Time: 02/13/20  3:44 PM  
Result Value Ref Range NT pro-BNP 2,154 (H) 5 - 125 PG/ML Assessment and Plan: Active Hospital Problems Diagnosis Date Noted  Acute respiratory failure ([facility] 02/13/2020  Atrial fibrillation ([facility] 01/13/2020  Acute hypoxemic respiratory failure ([facility] 01/10/2020  CAD (coronary artery disease) 01/10/2020  S/P AVR 01/10/2020  S/P CABG x 3 01/10/2020  DM type 2 (diabetes mellitus, type 2) ([facility] 01/14/2013  HLD (hyperlipidemia) 01/14/2013  
 HTN (hypertension) 01/14/2013 Admit to tele Acute on chronic hypoxic resp failure/pneumonia previous admission still on outpatient abx Consult Pulmonary, followed last admission, CXR with pleural effusions Wean O2 as tolerated Continue levaquin every 48 hours for 4 more doses CT chest with contrast r/o PE 
 
DVT Continue coumadin INR 2.4, pharmacy to dose HIT + last admission CAD s/p CABG s/p AVR Stable Continue current regimen HTN Stable DM II Continue home regimen Add SSI 
 
ESRD on HD Consult Nephrology Notes, labs, VS, diagnostic testing reviewed Time spent with pt 45 min DVT prophylaxis:  coumadin Code  DNR, discussed with pt 
Surrogate decision maker:  Crystal Escobar, discussed with pt 
Estimated length of stay: 48 hours Case discussed with pt, care team, Dr. Julissa Frazier, son at bedside Drew Silva, NP

## 2020-02-13 NOTE — ED TRIAGE NOTES
Patient to ed via [facility] with complaints of shortness of breath. Per ems they were called out to [facility] where he presented for his first dialysis treatment. Per ems about 2 hours into his run he started feeling anxious, shob, and had a hr of 100-118 so they called ems to bring him her. Per family he had a triple bypass and pacemaker placed during his hospital stay last month and was discharged to [address] [place] [address] and rehab yesterday. Per ems patient had an oxygen mask on at dialysis and sat was 98%. Patient with increased work of breathing on arrival on NRB at 15 L sat is 100%. Per family patient has not been on home oxygen at 3L since his discharge yesterday. Patient complains of mild non productive cough. Patient denies any chest pain, nausea, vomiting, but states he has been getting dizzy when he stand up at the rehab facility.

## 2020-02-13 NOTE — ED PROVIDER NOTES
The history is provided by the patient and a relative. Shortness of Breath This is a new problem. The average episode lasts 2 hours. The problem occurs continuously. The problem has been gradually improving. Associated symptoms include cough and sputum production. Pertinent negatives include no fever, no coryza, no rhinorrhea, no neck pain, no hemoptysis, no chest pain, no vomiting, no abdominal pain, no leg pain and no leg swelling. He has tried nothing for the symptoms. Associated medical issues include CAD and heart failure. Associated medical issues do not include COPD or PE. Past Medical History:  
Diagnosis Date  Arthritis  BPH (benign prostatic hyperplasia) 1/14/2013  CAD (coronary artery disease)  DM type 2 (diabetes mellitus, type 2) ([facility] dx 2004 Type 2, avg fbs , recognizes hypo at 67, last HA1C was 9.2 on 5/2014  Dyspnea   
 at times, Uses Albuterol inhaler when needed (last used first of aug 2014)  Gout 1/14/2013  HLD (hyperlipidemia) 1/14/2013  
 HTN (hypertension)   
 controlled with meds  Morbid obesity ([facility] 9/3/14 BMI 41.7  Psychiatric disorder   
 anxiety, controlled with buspar  Rheumatic fever   
 as a child  Seasonal allergic rhinitis   
 treated with Allegra  Severe aortic valve stenosis   
 echo 09/11/19 EF 60-65%- mild to moderate regurgitation  Thyroid disease   
 hx- no longer takes synthroid  Unspecified sleep apnea 2016  
 bi pap Past Surgical History:  
Procedure Laterality Date  HX CATARACT REMOVAL Bilateral 2012  HX HEART CATHETERIZATION  12/23/2019  HX ORTHOPAEDIC Left   
 carpal tunnel  HX TONSIL AND ADENOIDECTOMY  IR INSERT TUNL CVC W PORT OVER 5 YEARS  2/4/2020 Family History:  
Problem Relation Age of Onset  Lung Disease Mother   
     copd  Cancer Father   
     lympnode cancer  No Known Problems Brother  Cancer Other   
     fmh lymphoma  Diabetes Other fmhx  
 Diabetes Maternal Grandfather Social History Socioeconomic History  Marital status:  Spouse name: Not on file  Number of children: Not on file  Years of education: Not on file  Highest education level: Not on file Occupational History  Not on file Social Needs  Financial resource strain: Not on file  Food insecurity:  
  Worry: Not on file Inability: Not on file  Transportation needs:  
  Medical: Not on file Non-medical: Not on file Tobacco Use  Smoking status: Never Smoker  Smokeless tobacco: Never Used Substance and Sexual Activity  Alcohol use: Yes Alcohol/week: 0.0 standard drinks Comment: rarely  Drug use: Never  Sexual activity: Not on file Lifestyle  Physical activity:  
  Days per week: Not on file Minutes per session: Not on file  Stress: Not on file Relationships  Social connections:  
  Talks on phone: Not on file Gets together: Not on file Attends Jain service: Not on file Active member of club or organization: Not on file Attends meetings of clubs or organizations: Not on file Relationship status: Not on file  Intimate partner violence:  
  Fear of current or ex partner: Not on file Emotionally abused: Not on file Physically abused: Not on file Forced sexual activity: Not on file Other Topics Concern  Not on file Social History Narrative  Not on file ALLERGIES: Heparin; Amoxicillin; Keflex [cephalexin]; and Pcn [penicillins] Review of Systems Constitutional: Negative for fever. HENT: Negative for congestion and rhinorrhea. Respiratory: Positive for cough, sputum production and shortness of breath. Negative for hemoptysis. Cardiovascular: Negative for chest pain and leg swelling. Gastrointestinal: Negative for abdominal pain, nausea and vomiting. Endocrine: Negative for polyuria. Genitourinary: Negative for dysuria. Musculoskeletal: Negative for back pain and neck pain. Neurological: Negative for weakness and numbness. Psychiatric/Behavioral: The patient is nervous/anxious. Vitals:  
 02/13/20 1513 02/13/20 1514 BP: 149/74 Pulse: 93 Resp: 26 Temp:  97.7 °F (36.5 °C) SpO2: 98% Physical Exam 
Vitals signs and nursing note reviewed. Constitutional:   
   Appearance: He is well-developed. He is obese. He is not ill-appearing. Comments: Mild tachypnea noted HENT:  
   Mouth/Throat:  
   Pharynx: No oropharyngeal exudate. Eyes:  
   Conjunctiva/sclera: Conjunctivae normal.  
   Pupils: Pupils are equal, round, and reactive to light. Neck: Musculoskeletal: Neck supple. Cardiovascular:  
   Rate and Rhythm: Normal rate and regular rhythm. Heart sounds: Normal heart sounds. Pulmonary:  
   Effort: Pulmonary effort is normal.  
   Breath sounds: Examination of the right-lower field reveals decreased breath sounds. Examination of the left-lower field reveals decreased breath sounds. Decreased breath sounds present. Abdominal:  
   General: Bowel sounds are normal. There is no distension. Palpations: Abdomen is soft. Tenderness: There is no abdominal tenderness. There is no guarding or rebound. Musculoskeletal: Normal range of motion. General: No tenderness. Right lower leg: He exhibits no tenderness. Edema (Trace pitting edema bilaterally) present. Left lower leg: He exhibits no tenderness. Edema present. Lymphadenopathy:  
   Cervical: No cervical adenopathy. Skin: 
   General: Skin is warm and dry. Neurological:  
   Mental Status: He is alert and oriented to person, place, and time. MDM Number of Diagnoses or Management Options Diagnosis management comments: Shortness of breath during first episode of dialysis today patient was discharged from the hospital recently to rehab just yesterday. He was discharged there on 4 L nasal cannula which she is currently on right now with normal oxygen saturations. Patient has been removed from a nonrebreather mask that was placed at dialysis. Hopefully this is anxiety over dialysis but differential includes MI, congestive heart failure, pleural effusion and Dressler syndrome, pneumonia, PE, severe anemia. Work-up ordered. Vitals stable during my evaluation. Initially was tachycardic for RN. Amount and/or Complexity of Data Reviewed Clinical lab tests: ordered and reviewed Tests in the radiology section of CPT®: ordered and reviewed (Xr Chest [facility] Result Date: 2/13/2020 EXAMINATION: CHEST RADIOGRAPH 2/13/2020 3:55 PM ACCESSION NUMBER: [id number] INDICATION: shob COMPARISON: Chest x-ray 2/8/2020 TECHNIQUE: A single AP view of the chest was obtained. FINDINGS: Support Lines and Tubes: Unchanged cardiac pacemaker positioning. Unchanged left central venous catheter positioning. Cardiac Silhouette: Unchanged enlargement of the cardiac silhouette. Lungs: Unchanged left basilar opacity, moderate left pleural effusion. Improving interstitial edema. Pleura: No pneumothorax. Osseous Structures: Prior median sternotomy. Upper Abdomen: Unremarkable. IMPRESSION: 1. Improving interstitial edema. 2. Unchanged left basilar opacity and left pleural effusion. 3. Unchanged enlargement of the cardiac silhouette. VOICE DICTATED BY: Dr. Chris Webster ) Procedures

## 2020-02-14 PROBLEM — J18.9 HCAP (HEALTHCARE-ASSOCIATED PNEUMONIA): Status: ACTIVE | Noted: 2020-01-01

## 2020-02-14 NOTE — WOUND CARE
1045 Brief assessment of coccyx gluteal cleft wound, full thickness slough and mild erythema surrounding, demarcating, moderate amount of serosanguinous drainage, no odor. May need surgical debridement at some point. Left thigh upper wound is one of the vein harvest sites, from CABG, had wound vac in place until this am, the vac was alarming and the nurse called, dry dressing placed by the nurse this am. Assessment of the area is incision site of 3cm majority is closed, scar distal end of 0.8x0.4cm open, the area around was indurated, with out redness, the indurated area was palpated and a great deal of fluid clear yellow serous fluid was expressed, no odor, call the SURENDRA Marie for cardio thoracic surgery for assessment of this wound who came to assess, then decision to discontinue vac made, Aquacel AG, 4x4's ABD Kerlix and ace applied, will need bid dressing changes decreasedto daily and eventually every other day as drainage decreases. Bilateral feet toes briefly assessed, some toes with eschar beginning to form, these areas will continue to demarcate and may need surgical intervention as well, will consider dry dressing and betadine, currently dressing with xeroform and dry dressing. Son at bedside for this exam. Dialysis requested transport of patient during this exam, so did not complete the exam/ assessment fully. Will return after dialysis to finish what is needed. Sacrum dressed with aquacel ag, 4x4s and allevyn. Toes dressed with xeroform and Kerlix and left thigh dressed as described above. Will return this PM. Will monitor.

## 2020-02-14 NOTE — PROGRESS NOTES
Warfarin dosing per pharmacist 
 
Carrillo Arias is a 69 y.o. male. Height: 5' 10\" (177.8 cm)    Weight: 99.1 kg (218 lb 7.6 oz) Indication:  AVR and a fib Goal INR:  2.5 - 3.5 Home dose:  2.5 mg daily Risk factors or significant drug interactions:  levaquin (2/9), amiodarone dc'd (2/5) Other anticoagulants:  none Daily Monitoring Date  INR     Warfarin dose HGB              Notes 2/12  2.4  2.5 mg  9.8 Pharmacy consulted to assist with warfarin dosing. INR therapeutic on admission. Pt just discharged yesterday. Was on argatroban for HIT during last admission (dc'd 2/10). Levaquin started on 2/9 and amiodarone was dc'd on 2/5. Warfarin initiated on 2/4. INR trending down on home dose. Will continue home dose upon admission since pt has taken today's dose this am, but will check tomorrow's INR and adjust dose as needed. Will follow INRs and adjust dosing as needed. Thank you, 
Tamanna Chaudhry, PharmD Clinical Pharmacist 
[phone]

## 2020-02-14 NOTE — PROGRESS NOTES
OT orders received and chart reviewed. Pt off floor receiving dialysis this afternoon. Will hold therapy at this time and attempt at later time and as schedule permits. Thank you, Yoselin Chaudhary, OTR/L

## 2020-02-14 NOTE — PROGRESS NOTES
Problem: Mobility Impaired (Adult and Pediatric) Goal: *Acute Goals and Plan of Care (Insert Text) Description LTG: 
(1.)Mr. Clinton Smiley will move from supine to sit and sit to supine , scoot up and down and roll side to side with MODIFIED INDEPENDENCE within 7 treatment day(s) from flat surface. (2.)Mr. Clinton Smiley will transfer from bed to chair and chair to bed with MODIFIED INDEPENDENCE using the least restrictive device within 7 treatment day(s). (3.)Mr. Clinton Smiley will ambulate with SUPERVISION for 250+ feet with the least restrictive device within 7 treatment day(s) while maintaining normal vital signs. ____________________________________________________________________________________________ Outcome: Progressing Towards Goal 
  
PHYSICAL THERAPY: Initial Assessment and AM 2/14/2020 OBSERVATION: PT Visit Days : 1 Payor: [employer] / Plan: Rosalie Sara / Product Type: [employer] Medicare /   
  
NAME/AGE/GENDER: Ibeth Courtney is a 69 y.o. male PRIMARY DIAGNOSIS: Acute respiratory failure ([facility] [J96.00] Acute hypoxemic respiratory failure ([facility] Acute hypoxemic respiratory failure ([facility] ICD-10: Treatment Diagnosis:  
 Generalized Muscle Weakness (M62.81) Difficulty in walking, Not elsewhere classified (R26.2) Precaution/Allergies: 
Heparin; Amoxicillin; Keflex [cephalexin]; and Pcn [penicillins] ASSESSMENT:  
 
Mr. Clinton Smiley presents with decreased bed mobility, transfers, ambulation, balance, activity tolerance, strength and overall general functional mobility s/p hospital admission with ARF. Pt with recent discharge from this hospital on 2/12/20 s/p CABG and pacemaker placement. Pt discharge to [facility] for STR, went to HD, increased SOB at HD and returned to ED 2/13/20. Pt A & O x 4 this date, son present during assessment. Pt on 5 L/min O2 via n.c, O2 stats 96%.  Pt appears somewhat anxious with mobility, cues for controlled breathing, pt unwilling to allow decrease in O2 provided. Pt reports normally on RA, has been on 2-3 L at discharge and at [facility]. Pt reports dizziness with position changes and with ambulation. Pt CGA to MIN A for bed mobility, transfers and ambulation short distance with RW bed to chair, chair back to bed. Pt BP in sitting 102/55, standing 93/51. Pt with some possible orthostatic BP, educated on standing still after position change prior to mobility. PT to cont to follow for acute care needs to address deficits noted above. Pt spouse presently on 8th floor for treatment; pt would benefit from return to STR at discharge. This section established at most recent assessment PROBLEM LIST (Impairments causing functional limitations): 
Decreased Strength Decreased ADL/Functional Activities Decreased Transfer Abilities Decreased Ambulation Ability/Technique Decreased Balance Decreased Activity Tolerance Increased Fatigue Increased Shortness of Breath Edema/Girth INTERVENTIONS PLANNED: (Benefits and precautions of physical therapy have been discussed with the patient.) Balance Exercise Bed Mobility Family Education Gait Training Home Exercise Program (HEP) Neuromuscular Re-education/Strengthening Therapeutic Activites Therapeutic Exercise/Strengthening Transfer Training TREATMENT PLAN: Frequency/Duration: 3 times a week for duration of hospital stay Rehabilitation Potential For Stated Goals: Good REHAB RECOMMENDATIONS (at time of discharge pending progress):   
Placement: It is my opinion, based on this patient's performance to date, that Mr. Iliana Ross may benefit from intensive therapy at a [address] [place] [address] after discharge due to the functional deficits listed above that are likely to improve with skilled rehabilitation and concerns that he/she may be unsafe to be unsupervised at home due to a fall risk, safety concerns for transfers and ambulation at home. Equipment: To be determined HISTORY:  
History of Present Injury/Illness (Reason for Referral): Mr. 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 Was DC 2/12/20 from CABG and aortic valve replacement complicated by CKD requiring HD, HIT +, DVTs, wound left thigh on wound vac, pneumonia DC on levaquin Q 48 hours last dose 2/11/20. He was DC on 2 L O2 however has been increased to 4L O2 via NC at STR yesterday when he arrived. Today he was at HD and reports dizziness with sitting up and acute onset of SOB. Son at bedside and states pt was doing ok since DC yesterday until today at HD. INR 2.4. CXR showed improving interstitial edema, unchanged left basilar opacity and left pleural effusion. Pt given lasix in ED. Pt is SOB when talking in short sentences. Denies CP, n/v.  Has had diarrhea however is not new since hospitalization. Past Medical History/Comorbidities:  
Mr. Jaylin Montgomery  has a past medical history of Arthritis, BPH (benign prostatic hyperplasia) (1/14/2013), CAD (coronary artery disease), DM type 2 (diabetes mellitus, type 2) ([facility] (dx 2004), Dyspnea, Gout (1/14/2013), HLD (hyperlipidemia) (1/14/2013), HTN (hypertension), Morbid obesity ([facility] (9/3/14), Psychiatric disorder, Rheumatic fever, Seasonal allergic rhinitis, Severe aortic valve stenosis, Thyroid disease, and Unspecified sleep apnea (2016). Mr. Jaylin Montgomery  has a past surgical history that includes hx tonsil and adenoidectomy; hx heart catheterization (12/23/2019); hx orthopaedic (Left); hx cataract removal (Bilateral, 2012); and ir insert tunl cvc w port over 5 years (2/4/2020). Social History/Living Environment:  
Home Environment: (from rehab) Prior Level of Function/Work/Activity: Lives with spouse, admit from rehab; has been using RW short distance ambulation, assist ADLs Personal Factors:   
      Sex:  male Age:  69 y.o. Overall Behavior:  agreeable Number of Personal Factors/Comorbidities that affect the Plan of Care: 
CAD, DM, DVTs, age 3+: HIGH COMPLEXITY EXAMINATION:  
Most Recent Physical Functioning:  
Gross Assessment: 
AROM: Generally decreased, functional(B LE) Strength: Generally decreased, functional(B LE grossly 4/5) Coordination: Generally decreased, functional 
Sensation: Intact(B LE) Posture: 
Posture (WDL): Exceptions to [facility] Posture Assessment: Forward head, Rounded shoulders Balance: 
Sitting: Intact Standing: Impaired Standing - Static: Good;Fair 
Standing - Dynamic : Fair Bed Mobility: 
Rolling: Stand-by assistance Supine to Sit: Minimum assistance Sit to Supine: Minimum assistance Scooting: Minimum assistance Wheelchair Mobility: 
  
Transfers: 
Sit to Stand: Minimum assistance Stand to Sit: Minimum assistance Bed to Chair: Minimum assistance Gait: 
  
Base of Support: Widened Speed/Federica: Slow Step Length: Left shortened;Right shortened Swing Pattern: Left symmetrical;Right symmetrical 
Gait Abnormalities: Decreased step clearance Distance (ft): 5 Feet (ft)(from bed to chair, chair to bed) Assistive Device: Walker, rolling Ambulation - Level of Assistance: Contact guard assistance;Minimal assistance Interventions: Safety awareness training;Verbal cues Body Structures Involved: 
Heart Lungs Muscles Body Functions Affected: 
Cardio Respiratory Movement Related Activities and Participation Affected: 
General Tasks and Demands Mobility Self Care Number of elements that affect the Plan of Care: 4+: HIGH COMPLEXITY CLINICAL PRESENTATION:  
Presentation: Evolving clinical presentation with changing clinical characteristics: MODERATE COMPLEXITY CLINICAL DECISION MAKING:  
 [facility] Basic Mobility Inpatient Short Form How much difficulty does the patient currently have. .. Unable A Lot A Little None 1. Turning over in bed (including adjusting bedclothes, sheets and blankets)? [] 1   [] 2   [x] 3   [] 4  
2. Sitting down on and standing up from a chair with arms ( e.g., wheelchair, bedside commode, etc.)   [] 1   [] 2   [x] 3   [] 4  
3. Moving from lying on back to sitting on the side of the bed? [] 1   [] 2   [x] 3   [] 4 How much help from another person does the patient currently need. .. Total A Lot A Little None 4. Moving to and from a bed to a chair (including a wheelchair)? [] 1   [] 2   [x] 3   [] 4  
5. Need to walk in hospital room? [] 1   [] 2   [x] 3   [] 4  
6. Climbing 3-5 steps with a railing? [] 1   [x] 2   [] 3   [] 4  
© 2007, Trustees of [address] [place] [address], under license to [employer]. All rights reserved Score:  Initial: 17 Most Recent: X (Date: -- ) Interpretation of Tool:  Represents activities that are increasingly more difficult (i.e. Bed mobility, Transfers, Gait). Medical Necessity:    
Patient is expected to demonstrate progress in  
strength, range of motion, balance, and coordination 
 to  
decrease assistance required with overall functional mobility, transfers, ambulation May Cisneros Patient demonstrates  
good 
 rehab potential due to higher previous functional level. Reason for Services/Other Comments: 
Patient  
continues to require present interventions due to patient's inability to perform bed mobility, transfers, ambulation safely and effectively May Cisneros Use of outcome tool(s) and clinical judgement create a POC that gives a: Clear prediction of patient's progress: LOW COMPLEXITY  
  
 
 
 
TREATMENT:  
(In addition to Assessment/Re-Assessment sessions the following treatments were rendered) Pre-treatment Symptoms/Complaints:  \"I get dizzy every time I move\" Pain: Initial: Pain Intensity 1: 2(declines medication) Pain Location 1: Buttocks  Post Session:  2/10 post mobility, supine Gait Training ( 10 min):  Gait training to improve and/or restore physical functioning as related to mobility, strength, balance, and coordination. Ambulated 5 Feet (ft)(from bed to chair, chair to bed) with Contact guard assistance;Minimal assistance using a Walker, rolling and minimal Safety awareness training;Verbal cues related to their stance phase and stride length to promote proper body alignment, promote proper body posture, promote proper body mechanics, and promote proper body breathing techniques. Instruction in performance of posture, walker safety to correct overall functional mobility. Braces/Orthotics/Lines/Etc:  
O2 at 5L/min Treatment/Session Assessment:   
Response to Treatment:  fair tolerance, dizziness limits mobility Interdisciplinary Collaboration:  
Physical Therapist 
Registered Nurse After treatment position/precautions:  
Supine in bed Bed/Chair-wheels locked Bed in low position Call light within reach Family at bedside Side rails x 2 Compliance with Program/Exercises: Will assess as treatment progresses Recommendations/Intent for next treatment session: \"Next visit will focus on advancements to more challenging activities\". Total Treatment Duration: PT Patient Time In/Time Out Time In: 5 Time Out: 1046 Alberto Fee, PT

## 2020-02-14 NOTE — H&P (VIEW-ONLY)
CONSULT NOTE Vane Mak 2/14/2020 Date of Admission:  2/13/2020 The patient's chart is reviewed and the patient is discussed with the staff. Subjective:  
 
Patient is a 69 y.o.  male seen and evaluated at the request of Dr. Xiao Castillo for the evaluation of respiratory failure. He was just discharged on 2/12 to [facility] and came right back the next day. .Was DC 2/12/20 from CABG and aortic valve replacement complicated by CKD requiring HD, HIT +, DVTs, wound left thigh on wound vac, pneumonia DC on levaquin Q 48 hours last dose 2/11/20. He was DC on 2 L O2 however has been increased to 4L O2 via NC at STR yesterday when he arrived. he says he reports that during HD he felt dizzy  And had acute onset of SOB. He reports today he get SOB with exertion. HE is now on 4L NC with reported Spo2 97 %. He had chest CT with contrast- no PE, still LLL  infiltate with small effusion - Review of Systems A comprehensive review of systems was negative except for that written in the HPI. Patient Active Problem List  
Diagnosis Code  DM type 2 (diabetes mellitus, type 2) ([facility]) E11.9  
 Gout M10.9  
 HTN (hypertension) I10  
 BPH (benign prostatic hyperplasia) N40.0  Seasonal allergic rhinitis J30.2  
 HLD (hyperlipidemia) E78.5  Nonrheumatic aortic valve stenosis I35.0  Obesity, morbid ([address] E66.01  
 Type 2 diabetes with nephropathy ([facility]) E11.21  
 Bilateral carotid artery stenosis I65.23  
 Aortic valve stenosis I35.0  
 CAD (coronary artery disease) I25.10  Weaning from respirator [facility]) Z99.11  
 Acute hypoxemic respiratory failure ([facility]) J96.01  
 S/P CABG x 3 Z95.1  
 S/P AVR Z95.2  Acute renal failure (ARF) ([facility]) N17.9  Atrial fibrillation ([address] I48.91  Shock ([address] R57.9  Bilateral pneumothorax J93.9  Thrombocytopenia ([facility]) D69.6  
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  
 Pleural effusion J90  
  Acute respiratory failure (HCC) J96.00  
 HCAP (healthcare-associated pneumonia) J18.9 [facility] Prior to Admission Medications Prescriptions Last Dose Informant Patient Reported? Taking? ALPRAZolam (XANAX) 0.25 mg tablet   No No  
Sig: Take 1 Tab by mouth three (3) times daily as needed for Anxiety. Max Daily Amount: 0.75 mg.  
albuterol (PROVENTIL HFA) 90 mcg/actuation inhaler   No No  
Sig: Take 2 Puffs by inhalation every six (6) hours as needed for Wheezing. allopurinol (ZYLOPRIM) 300 mg tablet   No No  
Sig: Take 1 Tab by mouth daily. ascorbic acid (VITAMIN C) 500 mg tablet   Yes No  
Sig: Take 1,000 mg by mouth daily. busPIRone (BUSPAR) 10 mg tablet   No No  
Sig: Take 1 Tab by mouth three (3) times daily. coenzyme q10-vitamin e (COQ10 ) 100-100 mg-unit cap   Yes No  
Sig: Take 300 mg by mouth daily. insulin glargine (LANTUS) 100 unit/mL injection   No No  
Si Units by SubCUTAneous route nightly. insulin glargine (LANTUS) 100 unit/mL injection   No No  
Si Units by SubCUTAneous route daily. levoFLOXacin (LEVAQUIN) 500 mg tablet   No No  
Sig: Take 1 Tab by mouth every fourty-eight (48) hours for 4 doses. loperamide (IMODIUM) 2 mg capsule   Yes No  
Sig: Take 1 Cap by mouth every four (4) hours as needed for Diarrhea.  
lutein 20 mg tab   Yes No  
Sig: Take 1 tablet by mouth daily. mirtazapine (REMERON) 15 mg tablet   No No  
Sig: Take 1 Tab by mouth nightly. multivitamins-minerals-lutein (CENTRUM SILVER) Tab   Yes No  
Sig: Take 1 tablet by mouth daily. traMADol (ULTRAM) 50 mg tablet   No No  
Sig: Take 1 Tab by mouth every six (6) hours as needed for Pain for up to 30 days. Max Daily Amount: 200 mg.  
warfarin (COUMADIN) 2.5 mg tablet   No No  
Sig: Take 1 Tab by mouth every evening. Facility-Administered Medications: None Past Medical History:  
Diagnosis Date  Arthritis  BPH (benign prostatic hyperplasia) 2013  CAD (coronary artery disease)  DM type 2 (diabetes mellitus, type 2) ([facility] [address] dx 2004 Type 2, avg fbs , recognizes hypo at 67, last HA1C was 9.2 on 5/2014  Dyspnea   
 at times, Uses Albuterol inhaler when needed (last used first of aug 2014)  Gout 1/14/2013  HLD (hyperlipidemia) 1/14/2013  
 HTN (hypertension)   
 controlled with meds  Morbid obesity ([facility] [address] 9/3/14 BMI 41.7  Psychiatric disorder   
 anxiety, controlled with buspar  Rheumatic fever   
 as a child  Seasonal allergic rhinitis   
 treated with Allegra  Severe aortic valve stenosis   
 echo 09/11/19 EF 60-65%- mild to moderate regurgitation  Thyroid disease   
 hx- no longer takes synthroid  Unspecified sleep apnea 2016  
 bi pap Past Surgical History:  
Procedure Laterality Date  HX CATARACT REMOVAL Bilateral 2012  HX HEART CATHETERIZATION  12/23/2019  HX ORTHOPAEDIC Left   
 carpal tunnel  HX TONSIL AND ADENOIDECTOMY  IR INSERT TUNL CVC W PORT OVER 5 YEARS  2/4/2020 Social History Socioeconomic History  Marital status:  Spouse name: Not on file  Number of children: Not on file  Years of education: Not on file  Highest education level: Not on file Occupational History  Not on file Social Needs  Financial resource strain: Not on file  Food insecurity:  
  Worry: Not on file Inability: Not on file  Transportation needs:  
  Medical: Not on file Non-medical: Not on file Tobacco Use  Smoking status: Never Smoker  Smokeless tobacco: Never Used Substance and Sexual Activity  Alcohol use: Yes Alcohol/week: 0.0 standard drinks Comment: rarely  Drug use: Never  Sexual activity: Not on file Lifestyle  Physical activity:  
  Days per week: Not on file Minutes per session: Not on file  Stress: Not on file Relationships  Social connections:  
  Talks on phone: Not on file Gets together: Not on file Attends Religion service: Not on file Active member of club or organization: Not on file Attends meetings of clubs or organizations: Not on file Relationship status: Not on file  Intimate partner violence:  
  Fear of current or ex partner: Not on file Emotionally abused: Not on file Physically abused: Not on file Forced sexual activity: Not on file Other Topics Concern  Not on file Social History Narrative  Not on file Family History Problem Relation Age of Onset  Lung Disease Mother   
     copd  Cancer Father   
     lympnode cancer  No Known Problems Brother  Cancer Other   
     fmh lymphoma  Diabetes Other   
     fmhx  Diabetes Maternal Grandfather Allergies Allergen Reactions  Heparin Other (comments) HIT antibody test strongly positive on 1/17/2020. SHARON drawn 1/18/2020 and resulted positive on 1/21/2020  Amoxicillin Rash  Keflex [Cephalexin] Rash  Pcn [Penicillins] Other (comments) \"felt closed in\". No rash Current Facility-Administered Medications Medication Dose Route Frequency  warfarin (COUMADIN) tablet 3 mg  3 mg Oral QPM  
 busPIRone (BUSPAR) tablet 10 mg  10 mg Oral TID  insulin glargine (LANTUS) injection 18 Units  18 Units SubCUTAneous QHS  insulin glargine (LANTUS) injection 28 Units  28 Units SubCUTAneous DAILY  mirtazapine (REMERON) tablet 15 mg  15 mg Oral QHS  traMADol (ULTRAM) tablet 50 mg  50 mg Oral Q6H PRN  
 sodium chloride (NS) flush 5-40 mL  5-40 mL IntraVENous Q8H  
 sodium chloride (NS) flush 5-40 mL  5-40 mL IntraVENous PRN  
 acetaminophen (TYLENOL) tablet 650 mg  650 mg Oral Q4H PRN  
 naloxone (NARCAN) injection 0.4 mg  0.4 mg IntraVENous PRN  
 ondansetron (ZOFRAN) injection 4 mg  4 mg IntraVENous Q4H PRN  
 bisacodyL (DULCOLAX) tablet 5 mg  5 mg Oral DAILY PRN  
 insulin regular (NOVOLIN R, HUMULIN R) injection   SubCUTAneous AC&HS  
  levoFLOXacin (LEVAQUIN) 500 mg in D5W IVPB  500 mg IntraVENous Q48H Objective:  
 
Vitals:  
 02/13/20 2015 02/14/20 0040 02/14/20 6528 02/14/20 4645 BP: 96/51 104/53 132/62 134/70 Pulse: 86 75 82 81 Resp: 20 20 18 18 Temp: 97.9 °F (36.6 °C) 99.1 °F (37.3 °C) 98.5 °F (36.9 °C) 98.3 °F (36.8 °C) SpO2: 97% 95% 97% 100% Weight: 218 lb 7.6 oz (99.1 kg) Height: 5' 10\" (1.778 m) PHYSICAL EXAM  
 
Constitutional:  the patient is well developed and in no acute distress EENMT:  Sclera clear, pupils equal, oral mucosa moist 
Respiratory: decreasedBS Cardiovascular:  RRR without M,G,R 
Gastrointestinal: soft and non-tender; with positive bowel sounds. Musculoskeletal: warm without cyanosis. There is no lower extremity edema. Skin:  no jaundice or rashes, no wounds Neurologic: no gross neuro deficits Psychiatric:  alert and oriented x 3 Chest CT :  
 
 
Recent Labs  
  02/14/20 
0504 02/13/20 
1544 02/12/20 
0400 WBC 7.3 10.1 9.5 HGB 8.7* 9.8* 8.7* HCT 28.6* 31.8* 27.4*  
 142* 154 INR 2.4 2.4 2.6 Recent Labs  
  02/14/20 
0504 02/13/20 
1544 02/12/20 
0400  137 137  
K 4.1 4.1 4.4  100 102 * 155* 93  
CO2 28 29 27 BUN 38* 30* 57* CREA 4.22* 3.78* 5.85* CA 7.7* 8.1* 7.9*  
TROIQ  --  <0.02*  --   
ALB 1.8* 2.1*  --   
TBILI 0.4 0.5  --   
ALT 33 45  --   
SGOT 32 43*  -- No results for input(s): PH, PCO2, PO2, HCO3, PHI, PCO2I, PO2I, HCO3I in the last 72 hours. Recent Labs  
  02/13/20 
1544 LAC 2.0 Assessment:  (Medical Decision Making) Hospital Problems  Date Reviewed: 2/9/2020 Codes Class Noted POA HCAP (healthcare-associated pneumonia) ICD-10-CM: J18.9 ICD-9-CM: 754  2/14/2020 Unknown Acute respiratory failure ([facility] ICD-10-CM: J96.00 
ICD-9-CM: 518.81  2/13/2020 Unknown Atrial fibrillation ([facility] ICD-10-CM: I48.91 
ICD-9-CM: 427.31  1/13/2020 Yes CAD (coronary artery disease) ICD-10-CM: I25.10 ICD-9-CM: 414.00  1/10/2020 Yes * (Principal) Acute hypoxemic respiratory failure ([address] ICD-10-CM: J96.01 
ICD-9-CM: 518.81  1/10/2020 Yes S/P CABG x 3 ICD-10-CM: Z95.1 ICD-9-CM: V45.81  1/10/2020 Yes S/P AVR ICD-10-CM: Z95.2 ICD-9-CM: V43.3  1/10/2020 Yes DM type 2 (diabetes mellitus, type 2) (HCC) ICD-10-CM: E11.9 ICD-9-CM: 250.00  1/14/2013 Yes HTN (hypertension) ICD-10-CM: I10 
ICD-9-CM: 401.9  1/14/2013 Yes HLD (hyperlipidemia) ICD-10-CM: E78.5 ICD-9-CM: 272.4  1/14/2013 Yes 69 y old just discharged ,was treated for PNA with levaquin ,not finished yet. Chest CT unchanged Plan:  (Medical Decision Making) -- I would continue current ABX- his chest CT is not changed but it has been only 4 days since prior CT and he only took 4 days of levaquin 
- does not appear anything acute has happened - L effusion is too small for tap 
- wean 02 
- will add 3 % hypertonic saline and IPPB  
- PT More than 50% of the time documented was spent in face-to-face contact with the patient and in the care of the patient on the floor/unit where the patient is located. Thank you very much for this referral.  We appreciate the opportunity to participate in this patient's care. Will follow along with above stated plan.  
 
Litzy Tracy MD

## 2020-02-14 NOTE — PROGRESS NOTES
Wilda Castano Admission Date: 2/13/2020 Renal Daily Progress Note: 2/14/2020 The patient's chart is reviewed and the patient is discussed with the staff. Follow up ESRD Mr. Jaylin Montgomery is a 69 y.o male readmitted under observation with complaints of worsening SOB requiring higher levels of O2 via NC while on dialysis yesterday, and was [facility] he had 2.5 hrs of his treatment with 0.2 UF. CT with contrast 2/14 revealed LLL or pneumonia with a left pleural effusion, linear atelectasis of the right middles and right upper lobes, no evidence of PE. NT proBNP 2,154. We are consulted for ESRD. Subjective:  
Patient seen and examined on rounds anxious, complaints of shortness of breath, productive cough with thin white secretions. ROS: He denies CP, N/V/D, fever/ chills, appetite ok. Current Facility-Administered Medications Medication Dose Route Frequency  warfarin (COUMADIN) tablet 3 mg  3 mg Oral QPM  
 busPIRone (BUSPAR) tablet 10 mg  10 mg Oral TID  insulin glargine (LANTUS) injection 18 Units  18 Units SubCUTAneous QHS  insulin glargine (LANTUS) injection 28 Units  28 Units SubCUTAneous DAILY  mirtazapine (REMERON) tablet 15 mg  15 mg Oral QHS  traMADol (ULTRAM) tablet 50 mg  50 mg Oral Q6H PRN  
 sodium chloride (NS) flush 5-40 mL  5-40 mL IntraVENous Q8H  
 sodium chloride (NS) flush 5-40 mL  5-40 mL IntraVENous PRN  
 acetaminophen (TYLENOL) tablet 650 mg  650 mg Oral Q4H PRN  
 naloxone (NARCAN) injection 0.4 mg  0.4 mg IntraVENous PRN  
 ondansetron (ZOFRAN) injection 4 mg  4 mg IntraVENous Q4H PRN  
 bisacodyL (DULCOLAX) tablet 5 mg  5 mg Oral DAILY PRN  
 insulin regular (NOVOLIN R, HUMULIN R) injection   SubCUTAneous AC&HS  levoFLOXacin (LEVAQUIN) 500 mg in D5W IVPB  500 mg IntraVENous Q48H Objective:  
 
Vitals:  
 02/13/20 1738 02/13/20 2015 02/14/20 0040 02/14/20 2979 BP: 131/74 96/51 104/53 132/62 Pulse: 86 86 75 82 Resp:  20 20 18 Temp:  97.9 °F (36.6 °C) 99.1 °F (37.3 °C) 98.5 °F (36.9 °C) SpO2:  97% 95% 97% Weight:  99.1 kg (218 lb 7.6 oz) Height:  5' 10\" (1.778 m) Intake and Output:  
02/12 1901 - 02/14 0700 In: -  
Out: 250 [Urine:250] No intake/output data recorded. Physical Exam:  
Constitutional:  the patient is well developed and in no acute distress HEENT:  Sclera clear, pupils equal, oral mucosa moist 
Lungs: diminished lung sounds bilaterally, no wheezes Cardiovascular:  Regular rate, S1, S2, No Rub Abd/GI: soft and non-tender; with positive bowel sounds. Ext: warm without cyanosis. There is trace lower leg edema. Skin:  no jaundice or rashes Neuro: no gross neuro deficits Psychiatric: Calm. Access: Left TCC- intact LAB Recent Labs  
  02/14/20 
0504 02/13/20 
1544 02/12/20 
0400 WBC 7.3 10.1 9.5 HGB 8.7* 9.8* 8.7* HCT 28.6* 31.8* 27.4*  
 142* 154 INR 2.4 2.4 2.6 Recent Labs  
  02/14/20 
0504 02/13/20 
1544 02/12/20 
0400  137 137  
K 4.1 4.1 4.4  100 102 CO2 28 29 27 * 155* 93 BUN 38* 30* 57* CREA 4.22* 3.78* 5.85* ALB 1.8* 2.1*  --   
SGOT 32 43*  -- No results for input(s): PH, PCO2, PO2, HCO3 in the last 72 hours. Assessment:  (Medical Decision Making) Hospital Problems  Date Reviewed: 2/9/2020 Codes Class Noted POA Acute respiratory failure ([facility] ICD-10-CM: J96.00 
ICD-9-CM: 518.81  2/13/2020 Unknown Atrial fibrillation ([facility] ICD-10-CM: I48.91 
ICD-9-CM: 427.31  1/13/2020 Yes CAD (coronary artery disease) ICD-10-CM: I25.10 ICD-9-CM: 414.00  1/10/2020 Yes * (Principal) Acute hypoxemic respiratory failure ([facility] ICD-10-CM: J96.01 
ICD-9-CM: 518.81  1/10/2020 Yes S/P CABG x 3 ICD-10-CM: Z95.1 ICD-9-CM: V45.81  1/10/2020 Yes S/P AVR ICD-10-CM: Z95.2 ICD-9-CM: V43.3  1/10/2020 Yes DM type 2 (diabetes mellitus, type 2) (HCC) ICD-10-CM: E11.9 ICD-9-CM: 250.00  1/14/2013 Yes HTN (hypertension) ICD-10-CM: I10 
ICD-9-CM: 401.9  1/14/2013 Yes HLD (hyperlipidemia) ICD-10-CM: E78.5 ICD-9-CM: 272.4  1/14/2013 Yes Plan:  (Medical Decision Making) 1. DEJUAN/CKD recently d/c DEJUAN with outpatient HD TTS 
-HD today and tomorrow for volume and clearance 2. 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 S/P CABG 5. Anemia Emma Webster NP

## 2020-02-14 NOTE — PROGRESS NOTES
Progress Note 2020 Admit Date: 2020  3:03 PM  
NAME: Wilda Castano :  1945 MRN:  [id number] Attending: Charlene Huffman MD 
PCP:  Ursula Joseph MD 
Treatment Team: Attending Provider: Charlene Huffman MD; Nurse Practitioner: Azra Watson NP; Consulting Provider: Shanta Bustillos MD; Consulting Provider: Rodrigo Le MD; Utilization Review: Viviana Xavier RN; Occupational Therapist: Martha Miller; Care Manager: Norma Hogan DNR SUBJECTIVE:  
Mr. 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 Was DC 20 from CABG and aortic valve replacement complicated by CKD requiring HD, HIT +, DVTs, wound left thigh on wound vac, pneumonia DC on levaquin Q 48 hours last dose 20. He was DC on 2 L O2 however has been increased to 4L O2 via NC at STR when he arrived.  he was at HD and reports dizziness with sitting up and acute onset of SOB. CXR showed improving interstitial edema, unchanged left basilar opacity and left pleural effusion. Pt given lasix in ED. Chest CT showed unchanged in general, no PE. levaquin continued on admission. Pt does have sacral wound present on admission. Wound care consulted. Pulmonary consulted. Past Medical History:  
Diagnosis Date  Arthritis  BPH (benign prostatic hyperplasia) 2013  CAD (coronary artery disease)  DM type 2 (diabetes mellitus, type 2) ([facility] dx  Type 2, avg fbs , recognizes hypo at 67, last HA1C was 9.2 on 2014  Dyspnea   
 at times, Uses Albuterol inhaler when needed (last used first of aug 2014)  Gout 2013  HLD (hyperlipidemia) 2013  
 HTN (hypertension)   
 controlled with meds  Morbid obesity ([facility] 9/3/14 BMI 41.7  Psychiatric disorder   
 anxiety, controlled with buspar  Rheumatic fever   
 as a child  Seasonal allergic rhinitis   
 treated with Allegra  Severe aortic valve stenosis   
 echo 09/11/19 EF 60-65%- mild to moderate regurgitation  Thyroid disease   
 hx- no longer takes synthroid  Unspecified sleep apnea 2016  
 bi pap Recent Results (from the past 24 hour(s)) EKG, 12 LEAD, INITIAL Collection Time: 02/13/20  3:31 PM  
Result Value Ref Range Ventricular Rate 91 BPM  
 Atrial Rate 91 BPM  
 P-R Interval 180 ms QRS Duration 112 ms  
 Q-T Interval 368 ms QTC Calculation (Bezet) 452 ms Calculated P Axis 28 degrees Calculated R Axis -6 degrees Calculated T Axis 88 degrees Diagnosis    
  !! AGE AND GENDER SPECIFIC ECG ANALYSIS !! Normal sinus rhythm Nonspecific T wave abnormality Abnormal ECG When compared with ECG of 19-JAN-2020 09:53, Left bundle branch block is no longer Present Confirmed by Lm Cheema MD (), [facility] ([id number]) on 2/13/2020 5:02:25 PM 
  
CBC WITH AUTOMATED DIFF Collection Time: 02/13/20  3:44 PM  
Result Value Ref Range WBC 10.1 4.3 - 11.1 K/uL  
 RBC 3.36 (L) 4.23 - 5.6 M/uL HGB 9.8 (L) 13.6 - 17.2 g/dL HCT 31.8 (L) 41.1 - 50.3 % MCV 94.6 79.6 - 97.8 FL  
 MCH 29.2 26.1 - 32.9 PG  
 MCHC 30.8 (L) 31.4 - 35.0 g/dL  
 RDW 14.5 11.9 - 14.6 % PLATELET 781 (L) 061 - 450 K/uL MPV 10.0 9.4 - 12.3 FL ABSOLUTE NRBC 0.00 0.0 - 0.2 K/uL  
 DF AUTOMATED NEUTROPHILS 77 43 - 78 % LYMPHOCYTES 14 13 - 44 % MONOCYTES 6 4.0 - 12.0 % EOSINOPHILS 2 0.5 - 7.8 % BASOPHILS 0 0.0 - 2.0 % IMMATURE GRANULOCYTES 1 0.0 - 5.0 %  
 ABS. NEUTROPHILS 7.7 1.7 - 8.2 K/UL  
 ABS. LYMPHOCYTES 1.4 0.5 - 4.6 K/UL  
 ABS. MONOCYTES 0.6 0.1 - 1.3 K/UL  
 ABS. EOSINOPHILS 0.2 0.0 - 0.8 K/UL  
 ABS. BASOPHILS 0.0 0.0 - 0.2 K/UL  
 ABS. IMM. GRANS. 0.1 0.0 - 0.5 K/UL METABOLIC PANEL, COMPREHENSIVE  Collection Time: 02/13/20  3:44 PM  
 Result Value Ref Range Sodium 137 136 - 145 mmol/L Potassium 4.1 3.5 - 5.1 mmol/L Chloride 100 98 - 107 mmol/L  
 CO2 29 21 - 32 mmol/L Anion gap 8 7 - 16 mmol/L Glucose 155 (H) 65 - 100 mg/dL BUN 30 (H) 8 - 23 MG/DL Creatinine 3.78 (H) 0.8 - 1.5 MG/DL  
 GFR est AA 20 (L) >60 ml/min/1.73m2 GFR est non-AA 17 (L) >60 ml/min/1.73m2 Calcium 8.1 (L) 8.3 - 10.4 MG/DL Bilirubin, total 0.5 0.2 - 1.1 MG/DL  
 ALT (SGPT) 45 12 - 65 U/L  
 AST (SGOT) 43 (H) 15 - 37 U/L Alk. phosphatase 125 50 - 136 U/L Protein, total 6.6 6.3 - 8.2 g/dL Albumin 2.1 (L) 3.2 - 4.6 g/dL Globulin 4.5 (H) 2.3 - 3.5 g/dL A-G Ratio 0.5 (L) 1.2 - 3.5    
TROPONIN I Collection Time: 02/13/20  3:44 PM  
Result Value Ref Range Troponin-I, Qt. <0.02 (L) 0.02 - 0.05 NG/ML  
PROTHROMBIN TIME + INR Collection Time: 02/13/20  3:44 PM  
Result Value Ref Range Prothrombin time 26.7 (H) 12.0 - 14.7 sec INR 2.4 PROCALCITONIN Collection Time: 02/13/20  3:44 PM  
Result Value Ref Range Procalcitonin 0.78 ng/mL LACTIC ACID Collection Time: 02/13/20  3:44 PM  
Result Value Ref Range Lactic acid 2.0 0.4 - 2.0 MMOL/L  
NT-PRO BNP Collection Time: 02/13/20  3:44 PM  
Result Value Ref Range NT pro-BNP 2,154 (H) 5 - 125 PG/ML  
GLUCOSE, POC Collection Time: 02/13/20  9:35 PM  
Result Value Ref Range Glucose (POC) 182 (H) 65 - 100 mg/dL GLUCOSE, POC Collection Time: 02/13/20 11:28 PM  
Result Value Ref Range Glucose (POC) 211 (H) 65 - 100 mg/dL METABOLIC PANEL, COMPREHENSIVE Collection Time: 02/14/20  5:04 AM  
Result Value Ref Range Sodium 138 136 - 145 mmol/L Potassium 4.1 3.5 - 5.1 mmol/L Chloride 102 98 - 107 mmol/L  
 CO2 28 21 - 32 mmol/L Anion gap 8 7 - 16 mmol/L Glucose 163 (H) 65 - 100 mg/dL BUN 38 (H) 8 - 23 MG/DL Creatinine 4.22 (H) 0.8 - 1.5 MG/DL  
 GFR est AA 18 (L) >60 ml/min/1.73m2 GFR est non-AA 15 (L) >60 ml/min/1.73m2 Calcium 7.7 (L) 8.3 - 10.4 MG/DL Bilirubin, total 0.4 0.2 - 1.1 MG/DL  
 ALT (SGPT) 33 12 - 65 U/L  
 AST (SGOT) 32 15 - 37 U/L Alk. phosphatase 105 50 - 136 U/L Protein, total 5.6 (L) 6.3 - 8.2 g/dL Albumin 1.8 (L) 3.2 - 4.6 g/dL Globulin 3.8 (H) 2.3 - 3.5 g/dL A-G Ratio 0.5 (L) 1.2 - 3.5    
CBC WITH AUTOMATED DIFF Collection Time: 02/14/20  5:04 AM  
Result Value Ref Range WBC 7.3 4.3 - 11.1 K/uL  
 RBC 3.04 (L) 4.23 - 5.6 M/uL HGB 8.7 (L) 13.6 - 17.2 g/dL HCT 28.6 (L) 41.1 - 50.3 % MCV 94.1 79.6 - 97.8 FL  
 MCH 28.6 26.1 - 32.9 PG  
 MCHC 30.4 (L) 31.4 - 35.0 g/dL  
 RDW 14.5 11.9 - 14.6 % PLATELET 068 096 - 552 K/uL MPV 9.7 9.4 - 12.3 FL ABSOLUTE NRBC 0.00 0.0 - 0.2 K/uL  
 DF AUTOMATED NEUTROPHILS 71 43 - 78 % LYMPHOCYTES 17 13 - 44 % MONOCYTES 8 4.0 - 12.0 % EOSINOPHILS 4 0.5 - 7.8 % BASOPHILS 1 0.0 - 2.0 % IMMATURE GRANULOCYTES 0 0.0 - 5.0 %  
 ABS. NEUTROPHILS 5.2 1.7 - 8.2 K/UL  
 ABS. LYMPHOCYTES 1.2 0.5 - 4.6 K/UL  
 ABS. MONOCYTES 0.6 0.1 - 1.3 K/UL  
 ABS. EOSINOPHILS 0.3 0.0 - 0.8 K/UL  
 ABS. BASOPHILS 0.0 0.0 - 0.2 K/UL  
 ABS. IMM. GRANS. 0.0 0.0 - 0.5 K/UL PROTHROMBIN TIME + INR Collection Time: 02/14/20  5:04 AM  
Result Value Ref Range Prothrombin time 27.0 (H) 12.0 - 14.7 sec INR 2.4 GLUCOSE, POC Collection Time: 02/14/20  6:00 AM  
Result Value Ref Range Glucose (POC) 161 (H) 65 - 100 mg/dL GLUCOSE, POC Collection Time: 02/14/20 11:02 AM  
Result Value Ref Range Glucose (POC) 168 (H) 65 - 100 mg/dL Allergies Allergen Reactions  Heparin Other (comments) HIT antibody test strongly positive on 1/17/2020. SHARON drawn 1/18/2020 and resulted positive on 1/21/2020  Amoxicillin Rash  Keflex [Cephalexin] Rash  Pcn [Penicillins] Other (comments) \"felt closed in\". No rash Current Facility-Administered Medications Medication Dose Route Frequency Provider Last Rate Last Dose  warfarin (COUMADIN) tablet 3 mg  3 mg Oral QPM America Olmstead MD      
 albuterol (PROVENTIL VENTOLIN) nebulizer solution 2.5 mg  2.5 mg Nebulization Q6H RT Andreas Simons MD   Stopped at 02/14/20 1505  sodium chloride 3% hypertonic nebulizer soln  4 mL Nebulization BID RT Andreas Simons MD   Stopped at 02/14/20 1506  busPIRone (BUSPAR) tablet 10 mg  10 mg Oral TID Sydney HOUSER, NP   10 mg at 02/14/20 0821  
 insulin glargine (LANTUS) injection 18 Units  18 Units SubCUTAneous QHS Sydney HOUSER, NP   18 Units at 02/13/20 2327  
 insulin glargine (LANTUS) injection 28 Units  28 Units SubCUTAneous DAILY Emanuel Navarro NP   28 Units at 02/14/20 4376  mirtazapine (REMERON) tablet 15 mg  15 mg Oral QHS Sydney HOUSER, NP   15 mg at 02/13/20 2327  traMADol (ULTRAM) tablet 50 mg  50 mg Oral Q6H PRN Emanuel Navarro, NP      
 sodium chloride (NS) flush 5-40 mL  5-40 mL IntraVENous Q8H Sydney HOUSER, NP   10 mL at 02/13/20 2333  sodium chloride (NS) flush 5-40 mL  5-40 mL IntraVENous PRN Roberta Ramon, NP      
 acetaminophen (TYLENOL) tablet 650 mg  650 mg Oral Q4H PRN Emanuel Navarro, NP      
 naloxone [facility]) injection 0.4 mg  0.4 mg IntraVENous PRN Roberta Ramon, NP      
 ondansetron [facility]) injection 4 mg  4 mg IntraVENous Q4H PRN Raymona Ramon, NP      
 bisacodyL (DULCOLAX) tablet 5 mg  5 mg Oral DAILY PRN Emanuel Navarro, NP      
 insulin regular (NOVOLIN R, HUMULIN R) injection   SubCUTAneous AC&HS Emanuel Navarro, NP   2 Units at 02/14/20 6835  levoFLOXacin (LEVAQUIN) 500 mg in D5W IVPB  500 mg IntraVENous Q48H Emanuel Navarro NP Review of Systems negative with exception of pertinent positives noted above PHYSICAL EXAM  
 
Visit Vitals /69 Pulse 83 Temp 98.3 °F (36.8 °C) Resp 18 Ht 5' 10\" (1.778 m) Wt 99.1 kg (218 lb 7.6 oz) SpO2 98% BMI 31.35 kg/m² Temp (24hrs), Av.5 °F (36.9 °C), Min:97.9 °F (36.6 °C), Max:99.1 °F (37.3 °C) Oxygen Therapy O2 Sat (%): 98 % (20 1009) Pulse via Oximetry: 90 beats per minute (20 1520) O2 Device: Room air (20 1130) O2 Flow Rate (L/min): 5 l/min (20 1009) Intake/Output Summary (Last 24 hours) at 2020 1528 Last data filed at 2020 1517 Gross per 24 hour Intake 360 ml Output 3975 ml Net -3615 ml General:       Alert, cooperative, SOB when talking in short sentences, appears stated age. Head:            Normocephalic, without obvious abnormality, atraumatic. Nose:            Nares normal. No drainage or sinus tenderness. Lungs:          Diminished bases. Otherwise CTA bilaterally. Resp labored Heart:            S1S2 present with +murmur. No rubs or gallops. trace LE edema Abdomen:    Soft, non-tender. Not distended. Bowel sounds normal. No masses Extremities: Wound vac to left inner thigh. Necrosis to fingers and toes post high dose pressors Skin:             Not jaundiced. Surgical incision midsternal C/D/I, well approximated, non drainage Neurologic:  Alert and oriented X3. No focal deficits Results summary of Diagnostic Studies/Procedures copied from within [facility] EMR: 
 
 
 
ASSESSMENT Active Hospital Problems Diagnosis Date Noted  HCAP (healthcare-associated pneumonia) 2020  Acute respiratory failure ([facility] 2020  Atrial fibrillation ([facility] 2020  Acute hypoxemic respiratory failure ([facility] 01/10/2020  CAD (coronary artery disease) 01/10/2020  S/P AVR 01/10/2020  S/P CABG x 3 01/10/2020  DM type 2 (diabetes mellitus, type 2) ([facility] 2013  HLD (hyperlipidemia) 2013  
 HTN (hypertension) 2013 Plan: 
Acute on chronic hypoxic resp failure/pneumonia previous admission still on outpatient abx CT chest r/o PE, appears unchanged from last admission, DC  Pulmonary following Wean O2 as tolerated Continue levaquin every 48 hours for 4 more doses 
  
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 
  
CAD s/p CABG s/p AVR Stable Continue current regimen 
  
HTN Stable 
  
DM II Continue home regimen Add SSI 
  
ESRD on HD Nephrology following Had HD today Notes, labs, VS, diagnostic testing reviewed Time spent with pt 35 min DVT Prophylaxis: coumadin Plan of Care Discussed with: Supervising MD Dr. Gerardo Castellon, care team, pt Tanesha Alcaraz, NP

## 2020-02-14 NOTE — DIALYSIS
TRANSFER OUT- DIALYSIS Hemodialysis treatment completed without complications. Patient alert and VS stable  /69  P 83   
 
 3.5 Kgs removed. Flushed both ports with 10 mL of NS.  CVC dressing clean, dry, and intact, tego caps intact, bilateral lumens wrapped with 4x4 gauze. Patient to room 334 after dialysis.

## 2020-02-14 NOTE — CONSULTS
CONSULT NOTE Louisa Fernandez 2/14/2020 Date of Admission:  2/13/2020 The patient's chart is reviewed and the patient is discussed with the staff. Subjective:  
 
Patient is a 69 y.o.  male seen and evaluated at the request of Dr. Mian Woodruff for the evaluation of respiratory failure. He was just discharged on 2/12 to [facility] and came right back the next day. .Was DC 2/12/20 from CABG and aortic valve replacement complicated by CKD requiring HD, HIT +, DVTs, wound left thigh on wound vac, pneumonia DC on levaquin Q 48 hours last dose 2/11/20. He was DC on 2 L O2 however has been increased to 4L O2 via NC at STR yesterday when he arrived. he says he reports that during HD he felt dizzy  And had acute onset of SOB. He reports today he get SOB with exertion. HE is now on 4L NC with reported Spo2 97 %. He had chest CT with contrast- no PE, still LLL  infiltate with small effusion - Review of Systems A comprehensive review of systems was negative except for that written in the HPI. Patient Active Problem List  
Diagnosis Code  DM type 2 (diabetes mellitus, type 2) ([facility]) E11.9  
 Gout M10.9  
 HTN (hypertension) I10  
 BPH (benign prostatic hyperplasia) N40.0  Seasonal allergic rhinitis J30.2  
 HLD (hyperlipidemia) E78.5  Nonrheumatic aortic valve stenosis I35.0  Obesity, morbid ([address] E66.01  
 Type 2 diabetes with nephropathy ([facility]) E11.21  
 Bilateral carotid artery stenosis I65.23  
 Aortic valve stenosis I35.0  
 CAD (coronary artery disease) I25.10  Weaning from respirator [facility]) Z99.11  
 Acute hypoxemic respiratory failure (HCC) J96.01  
 S/P CABG x 3 Z95.1  
 S/P AVR Z95.2  Acute renal failure (ARF) ([facility]) N17.9  Atrial fibrillation ([address] I48.91  Shock ([facility] R57.9  Bilateral pneumothorax J93.9  Thrombocytopenia ([facility]) D69.6  
 HIT (heparin-induced thrombocytopenia) ([facility]) D75.82  
 Pleural effusion J90  
  Acute respiratory failure (HCC) J96.00  
 HCAP (healthcare-associated pneumonia) J18.9 Kelton Devries Prior to Admission Medications Prescriptions Last Dose Informant Patient Reported? Taking? ALPRAZolam (XANAX) 0.25 mg tablet   No No  
Sig: Take 1 Tab by mouth three (3) times daily as needed for Anxiety. Max Daily Amount: 0.75 mg.  
albuterol (PROVENTIL HFA) 90 mcg/actuation inhaler   No No  
Sig: Take 2 Puffs by inhalation every six (6) hours as needed for Wheezing. allopurinol (ZYLOPRIM) 300 mg tablet   No No  
Sig: Take 1 Tab by mouth daily. ascorbic acid (VITAMIN C) 500 mg tablet   Yes No  
Sig: Take 1,000 mg by mouth daily. busPIRone (BUSPAR) 10 mg tablet   No No  
Sig: Take 1 Tab by mouth three (3) times daily. coenzyme q10-vitamin e (COQ10 ) 100-100 mg-unit cap   Yes No  
Sig: Take 300 mg by mouth daily. insulin glargine (LANTUS) 100 unit/mL injection   No No  
Si Units by SubCUTAneous route nightly. insulin glargine (LANTUS) 100 unit/mL injection   No No  
Si Units by SubCUTAneous route daily. levoFLOXacin (LEVAQUIN) 500 mg tablet   No No  
Sig: Take 1 Tab by mouth every fourty-eight (48) hours for 4 doses. loperamide (IMODIUM) 2 mg capsule   Yes No  
Sig: Take 1 Cap by mouth every four (4) hours as needed for Diarrhea.  
lutein 20 mg tab   Yes No  
Sig: Take 1 tablet by mouth daily. mirtazapine (REMERON) 15 mg tablet   No No  
Sig: Take 1 Tab by mouth nightly. multivitamins-minerals-lutein (CENTRUM SILVER) Tab   Yes No  
Sig: Take 1 tablet by mouth daily. traMADol (ULTRAM) 50 mg tablet   No No  
Sig: Take 1 Tab by mouth every six (6) hours as needed for Pain for up to 30 days. Max Daily Amount: 200 mg.  
warfarin (COUMADIN) 2.5 mg tablet   No No  
Sig: Take 1 Tab by mouth every evening. Facility-Administered Medications: None Past Medical History:  
Diagnosis Date  Arthritis  BPH (benign prostatic hyperplasia) 2013  CAD (coronary artery disease)  DM type 2 (diabetes mellitus, type 2) ([facility] dx 2004 Type 2, avg fbs , recognizes hypo at 67, last HA1C was 9.2 on 5/2014  Dyspnea   
 at times, Uses Albuterol inhaler when needed (last used first of aug 2014)  Gout 1/14/2013  HLD (hyperlipidemia) 1/14/2013  
 HTN (hypertension)   
 controlled with meds  Morbid obesity ([facility] 9/3/14 BMI 41.7  Psychiatric disorder   
 anxiety, controlled with buspar  Rheumatic fever   
 as a child  Seasonal allergic rhinitis   
 treated with Allegra  Severe aortic valve stenosis   
 echo 09/11/19 EF 60-65%- mild to moderate regurgitation  Thyroid disease   
 hx- no longer takes synthroid  Unspecified sleep apnea 2016  
 bi pap Past Surgical History:  
Procedure Laterality Date  HX CATARACT REMOVAL Bilateral 2012  HX HEART CATHETERIZATION  12/23/2019  HX ORTHOPAEDIC Left   
 carpal tunnel  HX TONSIL AND ADENOIDECTOMY  IR INSERT TUNL CVC W PORT OVER 5 YEARS  2/4/2020 Social History Socioeconomic History  Marital status:  Spouse name: Not on file  Number of children: Not on file  Years of education: Not on file  Highest education level: Not on file Occupational History  Not on file Social Needs  Financial resource strain: Not on file  Food insecurity:  
  Worry: Not on file Inability: Not on file  Transportation needs:  
  Medical: Not on file Non-medical: Not on file Tobacco Use  Smoking status: Never Smoker  Smokeless tobacco: Never Used Substance and Sexual Activity  Alcohol use: Yes Alcohol/week: 0.0 standard drinks Comment: rarely  Drug use: Never  Sexual activity: Not on file Lifestyle  Physical activity:  
  Days per week: Not on file Minutes per session: Not on file  Stress: Not on file Relationships  Social connections:  
  Talks on phone: Not on file Gets together: Not on file Attends [employer] service: Not on file Active member of club or organization: Not on file Attends meetings of clubs or organizations: Not on file Relationship status: Not on file  Intimate partner violence:  
  Fear of current or ex partner: Not on file Emotionally abused: Not on file Physically abused: Not on file Forced sexual activity: Not on file Other Topics Concern  Not on file Social History Narrative  Not on file Family History Problem Relation Age of Onset  Lung Disease Mother   
     copd  Cancer Father   
     lympnode cancer  No Known Problems Brother  Cancer Other   
     fmh lymphoma  Diabetes Other   
     fmhx  Diabetes Maternal Grandfather Allergies Allergen Reactions  Heparin Other (comments) HIT antibody test strongly positive on 1/17/2020. SHARON drawn 1/18/2020 and resulted positive on 1/21/2020  Amoxicillin Rash  Keflex [Cephalexin] Rash  Pcn [Penicillins] Other (comments) \"felt closed in\". No rash Current Facility-Administered Medications Medication Dose Route Frequency  warfarin (COUMADIN) tablet 3 mg  3 mg Oral QPM  
 busPIRone (BUSPAR) tablet 10 mg  10 mg Oral TID  insulin glargine (LANTUS) injection 18 Units  18 Units SubCUTAneous QHS  insulin glargine (LANTUS) injection 28 Units  28 Units SubCUTAneous DAILY  mirtazapine (REMERON) tablet 15 mg  15 mg Oral QHS  traMADol (ULTRAM) tablet 50 mg  50 mg Oral Q6H PRN  
 sodium chloride (NS) flush 5-40 mL  5-40 mL IntraVENous Q8H  
 sodium chloride (NS) flush 5-40 mL  5-40 mL IntraVENous PRN  
 acetaminophen (TYLENOL) tablet 650 mg  650 mg Oral Q4H PRN  
 naloxone (NARCAN) injection 0.4 mg  0.4 mg IntraVENous PRN  
 ondansetron (ZOFRAN) injection 4 mg  4 mg IntraVENous Q4H PRN  
 bisacodyL (DULCOLAX) tablet 5 mg  5 mg Oral DAILY PRN  
 insulin regular (NOVOLIN R, HUMULIN R) injection   SubCUTAneous AC&HS  
  levoFLOXacin (LEVAQUIN) 500 mg in D5W IVPB  500 mg IntraVENous Q48H Objective:  
 
Vitals:  
 02/13/20 2015 02/14/20 0040 02/14/20 7882 02/14/20 6694 BP: 96/51 104/53 132/62 134/70 Pulse: 86 75 82 81 Resp: 20 20 18 18 Temp: 97.9 °F (36.6 °C) 99.1 °F (37.3 °C) 98.5 °F (36.9 °C) 98.3 °F (36.8 °C) SpO2: 97% 95% 97% 100% Weight: 218 lb 7.6 oz (99.1 kg) Height: 5' 10\" (1.778 m) PHYSICAL EXAM  
 
Constitutional:  the patient is well developed and in no acute distress EENMT:  Sclera clear, pupils equal, oral mucosa moist 
Respiratory: decreasedBS Cardiovascular:  RRR without M,G,R 
Gastrointestinal: soft and non-tender; with positive bowel sounds. Musculoskeletal: warm without cyanosis. There is no lower extremity edema. Skin:  no jaundice or rashes, no wounds Neurologic: no gross neuro deficits Psychiatric:  alert and oriented x 3 Chest CT :  
 
 
Recent Labs  
  02/14/20 
0504 02/13/20 
1544 02/12/20 
0400 WBC 7.3 10.1 9.5 HGB 8.7* 9.8* 8.7* HCT 28.6* 31.8* 27.4*  
 142* 154 INR 2.4 2.4 2.6 Recent Labs  
  02/14/20 
0504 02/13/20 
1544 02/12/20 
0400  137 137  
K 4.1 4.1 4.4  100 102 * 155* 93  
CO2 28 29 27 BUN 38* 30* 57* CREA 4.22* 3.78* 5.85* CA 7.7* 8.1* 7.9*  
TROIQ  --  <0.02*  --   
ALB 1.8* 2.1*  --   
TBILI 0.4 0.5  --   
ALT 33 45  --   
SGOT 32 43*  -- No results for input(s): PH, PCO2, PO2, HCO3, PHI, PCO2I, PO2I, HCO3I in the last 72 hours. Recent Labs  
  02/13/20 
1544 LAC 2.0 Assessment:  (Medical Decision Making) Hospital Problems  Date Reviewed: 2/9/2020 Codes Class Noted POA HCAP (healthcare-associated pneumonia) ICD-10-CM: J18.9 ICD-9-CM: 078  2/14/2020 Unknown Acute respiratory failure ([facility] ICD-10-CM: J96.00 
ICD-9-CM: 518.81  2/13/2020 Unknown Atrial fibrillation ([facility] ICD-10-CM: I48.91 
ICD-9-CM: 427.31  1/13/2020 Yes CAD (coronary artery disease) ICD-10-CM: I25.10 ICD-9-CM: 414.00  1/10/2020 Yes * (Principal) Acute hypoxemic respiratory failure ([address] ICD-10-CM: J96.01 
ICD-9-CM: 518.81  1/10/2020 Yes S/P CABG x 3 ICD-10-CM: Z95.1 ICD-9-CM: V45.81  1/10/2020 Yes S/P AVR ICD-10-CM: Z95.2 ICD-9-CM: V43.3  1/10/2020 Yes DM type 2 (diabetes mellitus, type 2) (HCC) ICD-10-CM: E11.9 ICD-9-CM: 250.00  1/14/2013 Yes HTN (hypertension) ICD-10-CM: I10 
ICD-9-CM: 401.9  1/14/2013 Yes HLD (hyperlipidemia) ICD-10-CM: E78.5 ICD-9-CM: 272.4  1/14/2013 Yes 69 y old just discharged ,was treated for PNA with levaquin ,not finished yet. Chest CT unchanged Plan:  (Medical Decision Making) -- I would continue current ABX- his chest CT is not changed but it has been only 4 days since prior CT and he only took 4 days of levaquin 
- does not appear anything acute has happened - L effusion is too small for tap 
- wean 02 
- will add 3 % hypertonic saline and IPPB  
- PT More than 50% of the time documented was spent in face-to-face contact with the patient and in the care of the patient on the floor/unit where the patient is located. Thank you very much for this referral.  We appreciate the opportunity to participate in this patient's care. Will follow along with above stated plan.  
 
Tera Espino MD

## 2020-02-14 NOTE — PROGRESS NOTES
Warfarin dosing per pharmacist 
 
Angelo Woodruff is a 69 y.o. male. Height: 5' 10\" (177.8 cm)    Weight: 99.1 kg (218 lb 7.6 oz) Indication:  AVR and a fib Goal INR:  2.5 - 3.5 Home dose:  2.5 mg daily Risk factors or significant drug interactions:  levaquin (2/9), amiodarone dc'd (2/5) Other anticoagulants:  none Daily Monitoring Date  INR     Warfarin dose HGB              Notes 2/14  2.4  3 mg  8.7 Pharmacy consulted to assist with warfarin dosing. INR therapeutic on admission. Pt just discharged yesterday. Was on argatroban for HIT during last admission (dc'd 2/10). Levaquin started on 2/9 and amiodarone was dc'd on 2/5. Warfarin initiated on 2/4. INR is slightly subtherapeutic. Will increase the dose slightly to 3 mg. Adjust as appropriate based on daily INRs. Will follow INRs and adjust dosing as needed. Thank you, Bessy Pratt, PharmD, BCPS Clinical Pharmacy Specialist

## 2020-02-14 NOTE — PROGRESS NOTES
Notified RN that patient would need dialysis within 24 hours after receiving IV contrast for CT scan. RN to notify this CT tech when pt can be scanned.

## 2020-02-14 NOTE — PROGRESS NOTES
Problem: Pressure Injury - Risk of 
Goal: *Prevention of pressure injury Description Document Alfredo Scale and appropriate interventions in the flowsheet. Outcome: Not Progressing Towards Goal 
Note:  
Pressure Injury Interventions: 
Wound present- Goal of preventing further progression of wound Sensory Interventions: Assess changes in LOC Moisture Interventions: Absorbent underpads, Check for incontinence Q2 hours and as needed Activity Interventions: PT/OT evaluation, Pressure redistribution bed/mattress(bed type) Mobility Interventions: Pressure redistribution bed/mattress (bed type), PT/OT evaluation Nutrition Interventions: Document food/fluid/supplement intake Encourage repositioning Friction and Shear Interventions: Apply protective barrier, creams and emollients, Foam dressings/transparent film/skin sealants

## 2020-02-14 NOTE — PROGRESS NOTES
Care Management Interventions PCP Verified by CM: Yes Mode of Transport at Discharge: BLS(Hien White Spouse [phone] ) Transition of Care Consult (CM Consult): Discharge Planning, SNF Discharge Durable Medical Equipment: No 
Physical Therapy Consult: Yes Occupational Therapy Consult: Yes Speech Therapy Consult: No 
Current Support Network: [address] [place] [address] Confirm Follow Up Transport: Family The Plan for Transition of Care is Related to the Following Treatment Goals : SNF The Patient and/or Patient Representative was Provided with a Choice of Provider and Agrees with the Discharge Plan?: Yes Name of the Patient Representative Who was Provided with a Choice of Provider and Agrees with the Discharge Plan: Pt son Freedom of Choice List was Provided with Basic Dialogue that Supports the Patient's Individualized Plan of Care/Goals, Treatment Preferences and Shares the Quality Data Associated with the Providers?: Yes Discharge Location Discharge Placement: Rehab Unit Subacute Pt admitted to 3rd floor [facility] for acute resp failure. CM met with pt son to discuss CM needs & DCP. Pt was recently dc to [place]. CM spoke with [facility], pt can return but will need new Barbara Coulter. Facility will initiate. Pt son is in agreement. PT OT ordered. Pt will not need new PPD. DCP Manna CM to continue to monitor.

## 2020-02-14 NOTE — PROGRESS NOTES
TRANSFER - IN REPORT: 
 
Verbal report received from [facility] (name) on Ovidio Welsh  being received from ED (unit) for routine progression of care Report consisted of patients Situation, Background, Assessment and  
Recommendations(SBAR). Information from the following report(s) SBAR, Kardex, ED Summary and MAR was reviewed with the receiving nurse. Opportunity for questions and clarification was provided. Assessment completed upon patients arrival to unit and care assumed. Large wound on sacrum. Pressure wound with approximated edges and tunneling on the left side. New dressing applied. Bilateral feet and toes black with no palpable pedal pulses. Dressing intact with xeroform and gauze from prior admission. Post tibial pulses dopplered. Bilateral heels intact with calluses. Left venous graft with wound vac from [address] [place] [address] ([facility]) intact. No drainage observed. Midsternal incision pink with scant crusted drainage observed. Right subclavian incision pink but healing. Various bruises on BLE observed. Wound consulted to see in AM. Turning encouraged. Will continue to monitor.

## 2020-02-14 NOTE — DIALYSIS
TRANSFER IN - DIALYSIS Received patient in dialysis unit  from [facility] (unit) for ordered procedure. Consent verified for renal replacement therapy. Patient alert and vital signs stable. /65 P 83 Hemodialysis initiated using Left CVC. Aspirated and flushed both ports without difficulty. Dressing clean, dry and intact. Machine settings per MD order. Heparin 0 unit bolus and 0 units/hr. Will monitor during treatment.

## 2020-02-14 NOTE — WOUND CARE
Coccyx and cleft wound slough in base, slough is loose and auto lytically debriding. Discussed with patient that wound will heal faster if slough is removed by sharp debridement, patient agreed for bedside debridement. Wound cleansed with betadine, slough removed with #15 blade, minimal bleeding stopped with pressure, wound base pink adipose exposed, Stage 3 pressure injury over coccyx full thickness skin loss in cleft with adipose exposed. Wound rinsed well with NS, then cleansed with Dermal wound , Aquacel AG packing with 4x4 fluffed packing to open areas covered with Allevyn, will need changed daily. Patient is on air mattress. Will add foam cushion for chair. All toes with visible lines of demarcation, pink peeling skin at toe/ foot edges and dark black hard areas at tips and pads of toe, areas cleansed well with dermal wound , loose skin removed mechanically during cleaning, 4th toenail on left foot fell off when cleansed all xeroform to all purple/ black areas dry gauze between each toe to keep toes apart, ABD on bottom of foot to pad foot and collect drainage when walking, wrapped with radha. Patient does not have sensation to 1-4th toe tips on both feet, decreased sensation of 5th toes. Concern will demarcate further and may need debridement at some point, consider vascular. DP pulses are palpable. Will add rooke boots. Abrasion on right lower leg and dry skin on lower abdomen and upper legs, will add Aquaphor. Tips of fingers dry and cracking (recently tips of fingers had also been black and now have peeled and healing will). Recommend aquaphor to fingers as well. Will monitor.

## 2020-02-14 NOTE — PROGRESS NOTES
Spiritual Care Visit, initial visit. Attempted to visit. Patient was off the floor. Visit by Ivet Hammond, Staff .  M.Ed., Walt.B., B.A.

## 2020-02-15 PROBLEM — E87.70 FLUID OVERLOAD: Status: ACTIVE | Noted: 2020-01-01

## 2020-02-15 NOTE — DIALYSIS
TRANSFER IN - DIALYSIS Received patient in dialysis unit  from [facility] (unit) for ordered procedure. Consent verified for renal replacement therapy. Patient stable and vital signs stable.  50  P81 Hemodialysis initiated using PC. Aspirated and flushed both ports without difficulty. Dressing clean, dry and intact. Machine settings per MD order. Will monitor during treatment.

## 2020-02-15 NOTE — PROGRESS NOTES
Verbal bedside report given to Suellen Casas., oncoming RN. Patient's situation, background, assessment and recommendations provided. Opportunity for questions provided. Oncoming RN assumed care of patient.

## 2020-02-15 NOTE — PROGRESS NOTES
Hospitalist Progress Note Admit Date:  2020  3:03 PM  
Name:  Louisa Fernandez Age:  69 y.o. 
:  1945 MRN:  [id number] PCP:  Kelly Yadav MD 
Treatment Team: Attending Provider: Marine Frankel MD; Nurse Practitioner: Amy Dunn NP; Consulting Provider: Billi Buerger, MD; Consulting Provider: Ryder Ray MD; Utilization Review: Martha Randall RN; Care Manager: Melissa Glez; Occupational Therapist: Tunde Lal Subjective:  
 
 Mr. 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 Was DC 20 from CABG and aortic valve replacement complicated by CKD requiring HD, HIT +, DVTs, wound left thigh on wound vac, pneumonia DC on levaquin Q 48 hours last dose 20. He was DC on 2 L O2 however has been increased to 4L O2 via NC at STR when he arrived.  he was at HD and reports dizziness with sitting up and acute onset of SOB. CXR showed improving interstitial edema, unchanged left basilar opacity and left pleural effusion. Pt given lasix in ED. He completed HD  and again today 02/15. Levaquin continued on admission. Pt evaluated in dialysis, he states breathing is some improved. He has not exerted himself at all though, been in bed all day. Objective:  
 
Patient Vitals for the past 24 hrs: 
 Temp Pulse Resp BP SpO2  
02/15/20 1126  76  114/76   
02/15/20 1100  67  91/65   
02/15/20 1034  90  105/71   
02/15/20 0958  72  (!) 130/91   
02/15/20 0933  84  113/62   
02/15/20 0907  81  124/50   
02/15/20 0856 98.2 °F (36.8 °C) 83 18 118/52 98 % 02/15/20 0537 98.7 °F (37.1 °C) 86 18 111/58 97 % 02/15/20 0157     96 % 02/15/20 0100 97.7 °F (36.5 °C) 87 18 94/52 96 % 20 2210     94 % 02/14/20 2111 98 °F (36.7 °C) 83 20 128/62 95 % 02/14/20 1758     95 % 02/14/20 1754     97 % 02/14/20 1610 98.6 °F (37 °C) 88 18 106/57 100 % 02/14/20 1517  83  131/69   
02/14/20 1500  85  105/70   
02/14/20 1430  87  131/61   
02/14/20 1401  69  (!) 146/111   
02/14/20 1330  80  111/64   
02/14/20 1303  80  126/67   
02/14/20 1238  90  108/59   
02/14/20 1230  77  (!) 76/60   
02/14/20 1222  84  116/69  Oxygen Therapy O2 Sat (%): 98 % (02/15/20 0856) Pulse via Oximetry: 82 beats per minute (02/15/20 0157) O2 Device: Nasal cannula (02/15/20 0750) O2 Flow Rate (L/min): 3 l/min (02/15/20 0750) Intake/Output Summary (Last 24 hours) at 2/15/2020 1158 Last data filed at 2/14/2020 2568 Gross per 24 hour Intake 540 ml Output 3625 ml Net -3085 ml General:    Well nourished. Alert. CV:   RRR. No murmur, rub, or gallop. Lungs:   CTAB. No wheezing, rhonchi, or rales. Abdomen:   Soft, nontender, nondistended. Extremities: Warm and dry. No cyanosis or edema. Skin:     No rashes or jaundice. Neuro:  No gross focal deficits Data Review: 
I have reviewed all labs, meds, telemetry events, and studies from the last 24 hours: 
 
Recent Results (from the past 24 hour(s)) GLUCOSE, POC Collection Time: 02/14/20  3:56 PM  
Result Value Ref Range Glucose (POC) 100 65 - 100 mg/dL GLUCOSE, POC Collection Time: 02/14/20  9:41 PM  
Result Value Ref Range Glucose (POC) 221 (H) 65 - 100 mg/dL PROTHROMBIN TIME + INR Collection Time: 02/15/20  3:49 AM  
Result Value Ref Range Prothrombin time 25.2 (H) 12.0 - 14.7 sec INR 2.2 METABOLIC PANEL, BASIC Collection Time: 02/15/20  3:49 AM  
Result Value Ref Range Sodium 137 136 - 145 mmol/L Potassium 3.9 3.5 - 5.1 mmol/L Chloride 100 98 - 107 mmol/L  
 CO2 29 21 - 32 mmol/L Anion gap 8 7 - 16 mmol/L Glucose 150 (H) 65 - 100 mg/dL  BUN 30 (H) 8 - 23 MG/DL  
 Creatinine 3.67 (H) 0.8 - 1.5 MG/DL  
 GFR est AA 21 (L) >60 ml/min/1.73m2 GFR est non-AA 17 (L) >60 ml/min/1.73m2 Calcium 7.8 (L) 8.3 - 10.4 MG/DL  
CBC W/O DIFF Collection Time: 02/15/20  3:49 AM  
Result Value Ref Range WBC 9.1 4.3 - 11.1 K/uL  
 RBC 3.31 (L) 4.23 - 5.6 M/uL HGB 9.6 (L) 13.6 - 17.2 g/dL HCT 31.5 (L) 41.1 - 50.3 % MCV 95.2 79.6 - 97.8 FL  
 MCH 29.0 26.1 - 32.9 PG  
 MCHC 30.5 (L) 31.4 - 35.0 g/dL  
 RDW 14.2 11.9 - 14.6 % PLATELET 817 (L) 174 - 450 K/uL MPV 10.0 9.4 - 12.3 FL ABSOLUTE NRBC 0.00 0.0 - 0.2 K/uL GLUCOSE, POC Collection Time: 02/15/20  4:15 AM  
Result Value Ref Range Glucose (POC) 158 (H) 65 - 100 mg/dL GLUCOSE, POC Collection Time: 02/15/20  6:16 AM  
Result Value Ref Range Glucose (POC) 171 (H) 65 - 100 mg/dL GLUCOSE, POC Collection Time: 02/15/20 10:10 AM  
Result Value Ref Range Glucose (POC) 187 (H) 65 - 100 mg/dL All Micro Results None No results found for this visit on 02/13/20. Current Meds: 
Current Facility-Administered Medications Medication Dose Route Frequency  warfarin (COUMADIN) tablet 4 mg  4 mg Oral QPM  
 albuterol (PROVENTIL VENTOLIN) nebulizer solution 2.5 mg  2.5 mg Nebulization Q6H RT  
 sodium chloride 3% hypertonic nebulizer soln  4 mL Nebulization BID RT  
 pantothenic ac-min oil-pet,hyd (AQUAPHOR) 41 % ointment   Topical DAILY  busPIRone (BUSPAR) tablet 10 mg  10 mg Oral TID  insulin glargine (LANTUS) injection 18 Units  18 Units SubCUTAneous QHS  insulin glargine (LANTUS) injection 28 Units  28 Units SubCUTAneous DAILY  mirtazapine (REMERON) tablet 15 mg  15 mg Oral QHS  traMADol (ULTRAM) tablet 50 mg  50 mg Oral Q6H PRN  
 sodium chloride (NS) flush 5-40 mL  5-40 mL IntraVENous Q8H  
 sodium chloride (NS) flush 5-40 mL  5-40 mL IntraVENous PRN  
 acetaminophen (TYLENOL) tablet 650 mg  650 mg Oral Q4H PRN  
  naloxone (NARCAN) injection 0.4 mg  0.4 mg IntraVENous PRN  
 ondansetron (ZOFRAN) injection 4 mg  4 mg IntraVENous Q4H PRN  
 bisacodyL (DULCOLAX) tablet 5 mg  5 mg Oral DAILY PRN  
 insulin regular (NOVOLIN R, HUMULIN R) injection   SubCUTAneous AC&HS  levoFLOXacin (LEVAQUIN) 500 mg in D5W IVPB  500 mg IntraVENous Q48H Other Studies (last 24 hours): No results found. Assessment and Plan:  
 
Hospital Problems as of 2/15/2020 Date Reviewed: 2/9/2020 Codes Class Noted - Resolved POA HCAP (healthcare-associated pneumonia) ICD-10-CM: J18.9 ICD-9-CM: 790  2/14/2020 - Present Unknown Acute respiratory failure ([facility] ICD-10-CM: J96.00 
ICD-9-CM: 518.81  2/13/2020 - Present Unknown Atrial fibrillation ([facility] ICD-10-CM: I48.91 
ICD-9-CM: 427.31  1/13/2020 - Present Yes CAD (coronary artery disease) ICD-10-CM: I25.10 ICD-9-CM: 414.00  1/10/2020 - Present Yes * (Principal) Acute hypoxemic respiratory failure ([facility] ICD-10-CM: J96.01 
ICD-9-CM: 518.81  1/10/2020 - Present Yes S/P CABG x 3 ICD-10-CM: Z95.1 ICD-9-CM: V45.81  1/10/2020 - Present Yes S/P AVR ICD-10-CM: Z95.2 ICD-9-CM: V43.3  1/10/2020 - Present Yes DM type 2 (diabetes mellitus, type 2) (HCC) ICD-10-CM: E11.9 ICD-9-CM: 250.00  1/14/2013 - Present Yes HTN (hypertension) ICD-10-CM: I10 
ICD-9-CM: 401.9  1/14/2013 - Present Yes HLD (hyperlipidemia) ICD-10-CM: E78.5 ICD-9-CM: 272.4  1/14/2013 - Present Yes Plan: 
Acute on chronic hypoxic resp failure/pneumonia previous admission still on outpatient abx CT chest r/o PE, appears unchanged from last admission, DC 2/12 Pulmonary following Wean O2 as tolerated Continue levaquin every 48 hours for 4 more doses DVT Continue coumadin INR 2.4, pharmacy to dose HIT + last admission CAD s/p CABG s/p AVR Stable Continue current regimen HTN Stable DM II Continue home regimen Add SSI ESRD on HD Nephrology following Had HD 02/14 and again 02/15 Past medical, family and social history reviewed. No changes from H&P by Emperatriz Acuña on 02/13/2020 Notes, labs, VS, diagnostic testing reviewed Time spent with pt 35 min DVT Prophylaxis: coumadin Plan of Care Discussed with: Supervising MD Dr. Jayy Acosta, care team, pt Signed: 
Cary Espinoza NP

## 2020-02-15 NOTE — PROGRESS NOTES
Problem: Self Care Deficits Care Plan (Adult) Goal: *Acute Goals and Plan of Care (Insert Text) Description 1. Patient will complete lower body bathing and dressing with Mod I and adaptive equipment as needed. 2. Patient will complete toileting with Mod I and adaptive equipment as needed. 3. Patient will tolerate 23 minutes of OT treatment with 2 rest breaks to increase activity tolerance for ADLs. 4. Patient will complete functional transfers with Mod I and adaptive equipment as needed. 5. Patient will complete functional transfer to sink to perform grooming with Mod I and adaptive equipment as needed. 6. Patient will perform pursed-lip breathing with one verbal and one visual cue to increase activity tolerance for performance of ADLs. Timeframe: 7 visits Outcome: Progressing Towards Goal 
 
OCCUPATIONAL THERAPY: Initial Assessment, Daily Note, and PM 2/15/2020 INPATIENT: OT Visit Days: 1 Payor: Elaine Reeder / Plan: Samantha Jean / Product Type: [employer] Medicare /  
  
NAME/AGE/GENDER: Priya Guevara is a 69 y.o. male PRIMARY DIAGNOSIS:  Acute respiratory failure ([facility] [J96.00] Fluid overload [E87.70] Acute hypoxemic respiratory failure (HCC) Acute hypoxemic respiratory failure (HCC) ICD-10: Treatment Diagnosis:  
 · Generalized Muscle Weakness (M62.81) · Difficulty in walking, Not elsewhere classified (R26.2) Precautions/Allergies: 
   Heparin; Amoxicillin; Keflex [cephalexin]; and Pcn [penicillins] ASSESSMENT:  
 
Mr. Manjit Rodriguez  is a 69 y.o. male admitted for Acute respiratory failure. At baseline, patient lives with wife in one story home with 2 ZACHARIAH. Wife home and available 24/7 if needed. Pt is typically Mod I to independent for performance of ADLs and IADLs. Pt typically uses no DME for in home or community mobility, but does have [facility] available if needed.  Reports that he is able to walk approximately 200 feet without any DME before having to stop to take a rest break due to becoming SOB. Pt reports no prior fall history. Upon arrival, pt supine in bed breathing 3L O2 with telemetry monitor in place. Pt alert and oriented x 4 and cooperative throughout session. Reports 10/10 pain in buttocks and has pillow in place to provide pressure relief. Patient able to complete rolling with Min A and transfer from supine to sit with CGA assist. Pt completes transfer very quickly. Seated edge of bed, pt presents with intact static sitting balance but reports increased dizziness. Blood pressure checked by PCT and 101/46. Pt Max A to don/doff sock due to increased dizziness. Pt presents with continuous blinking and reports he does this because he feels dizzy. Pt tracking checked and pt requires head movements to follow. BUE within functional limits for ROM, but generally decreased for strength and fine motor coordination. Bilateral tremors in both hands. Dizziness subsides and pt performs functional transfer from sit to stand with CGA and use of RW. Upon standing, pt presents with increased dizziness and SOB and requires return to seated edge of bed. O2 levels checked and pt desaturated to 86%. Pt educated and performed pursed-lip breathing and O2 levels returned to above 90% within one minute. Pt returned to bed and requires Mod A x 2 to scoot higher in bed (assist provided from PCT). Dinner arrives and [facility] elevated to 90 degrees for pt to proceed with feeding. Due to bilateral tremors, pt requires Min A to doff paper from straw and uncover lid for drinking from cup. Pt left in bed with all needs met and within reach at this time. RN at bedside. Carrillo Arias is currently functioning below baseline for ADLs and functional mobility and would benefit from acute OT to increase independence and safety with ADLs and functional mobility. Will follow for duration of hospital stay to address the above goals.  Patient was educated on role and plan of care of occupational therapy. This section established at most recent assessment PROBLEM LIST (Impairments causing functional limitations): 1. Decreased Strength 2. Decreased ADL/Functional Activities 3. Decreased Transfer Abilities 4. Decreased Ambulation Ability/Technique 5. Decreased Balance 6. Increased Pain 7. Decreased Activity Tolerance 8. Decreased Pacing Skills 9. Increased Fatigue 10. Increased Shortness of Breath 11. Decreased Skin Integrity/Hygeine INTERVENTIONS PLANNED: (Benefits and precautions of occupational therapy have been discussed with the patient.) 1. Activities of daily living training 2. Adaptive equipment training 3. Balance training 4. Clothing management 5. Hygiene training 6. Neuromuscular re-eduation 7. Re-evaluation 8. Therapeutic activity 9. Therapeutic exercise TREATMENT PLAN: Frequency/Duration: Follow patient 3x/week to address above goals. Rehabilitation Potential For Stated Goals: Fair REHAB RECOMMENDATIONS (at time of discharge pending progress):   
Placement: It is my opinion, based on this patient's performance to date, that Mr. Jose Manuel Roberson may benefit from participating in 1-2 additional therapy sessions in order to continue to assess for rehab potential and then make recommendation for disposition at discharge. Equipment: ? TBD   
    
 
 
 
OCCUPATIONAL PROFILE AND HISTORY:  
History of Present Injury/Illness (Reason for Referral): 
See H & P Past Medical History/Comorbidities:  
Mr. Jose Manuel Roberson  has a past medical history of Arthritis, BPH (benign prostatic hyperplasia) (1/14/2013), CAD (coronary artery disease), DM type 2 (diabetes mellitus, type 2) ([facility] (dx 2004), Dyspnea, Gout (1/14/2013), HLD (hyperlipidemia) (1/14/2013), HTN (hypertension), Morbid obesity ([facility] (9/3/14), Psychiatric disorder, Rheumatic fever, Seasonal allergic rhinitis, Severe aortic valve stenosis, Thyroid disease, and Unspecified sleep apnea (2016). Mr. Winford Phoenix  has a past surgical history that includes hx tonsil and adenoidectomy; hx heart catheterization (12/23/2019); hx orthopaedic (Left); hx cataract removal (Bilateral, 2012); and ir insert tunl cvc w port over 5 years (2/4/2020). Social History/Living Environment:  
Home Environment: Private residence # Steps to Enter: 2 Rails to Enter: No 
One/Two Story Residence: One story Living Alone: No 
Support Systems: Spouse/Significant Other/Partner Patient Expects to be Discharged to[de-identified] Rehabilitation facility Current DME Used/Available at Home: Cane, straight, Walker, rolling Tub or Shower Type: Shower Prior Level of Function/Work/Activity: At baseline, patient lives with wife in one story home with 2 ZACHARIAH. Wife home and available 24/7 if needed. Pt is typically Mod I to independent for performance of ADLs and IADLs. Pt typically uses no DME for in home or community mobility, but does have [address] available if needed. Reports that he is able to walk approximately 200 feet without any DME before having to stop to take a rest break due to becoming SOB. Pt reports no prior fall history. Personal Factors:   
      Sex:  male Age:  69 y.o. Number of Personal Factors/Comorbidities that affect the Plan of Care: Extensive review of physical, cognitive, and psychosocial performance (3+):  HIGH COMPLEXITY ASSESSMENT OF OCCUPATIONAL PERFORMANCE[de-identified]  
Activities of Daily Living:  
Basic ADLs (From Assessment) Complex ADLs (From Assessment) Feeding: Minimum assistance Oral Facial Hygiene/Grooming: Minimum assistance Bathing: Moderate assistance Upper Body Dressing: Minimum assistance Lower Body Dressing: Moderate assistance Toileting: Moderate assistance Instrumental ADL Meal Preparation: Total assistance Homemaking: Total assistance Grooming/Bathing/Dressing Activities of Daily Living Feeding Feeding Assistance: Minimum assistance Lower Body Dressing Assistance Socks: Maximum assistance Bed/Mat Mobility Rolling: Minimum assistance Supine to Sit: Contact guard assistance Sit to Stand: Contact guard assistance Stand to Sit: Contact guard assistance Scooting: Moderate assistance(In supine) Most Recent Physical Functioning:  
Gross Assessment: 
AROM: Within functional limits Strength: Generally decreased, functional 
Coordination: Grossly decreased, non-functional 
         
  
Posture: 
Posture (WDL): Exceptions to [facility] Posture Assessment: Forward head, Rounded shoulders Balance: 
Sitting: Intact; With support Standing: Intact; With support Standing - Static: Fair;Good Bed Mobility: 
Rolling: Minimum assistance Supine to Sit: Contact guard assistance Scooting: Moderate assistance(In supine) Wheelchair Mobility: 
  
Transfers: 
Sit to Stand: Contact guard assistance Stand to Sit: Contact guard assistance Patient Vitals for the past 6 hrs: 
 BP BP Patient Position SpO2 O2 Flow Rate (L/min) Pulse 02/15/20 1126 114/76    76  
02/15/20 1158 115/78    81  
02/15/20 1236 (!) 97/33    85  
02/15/20 1419 97/55 At rest 98 %  88  
02/15/20 1530    3 l/min   
02/15/20 1712 102/46  94 %  82 Mental Status Neurologic State: Alert Orientation Level: Oriented X4 Cognition: Follows commands Perception: Appears intact Perseveration: No perseveration noted Safety/Judgement: Awareness of environment, Fall prevention Physical Skills Involved: 
1. Balance 2. Strength 3. Activity Tolerance 4. Fine Motor Control 5. Vision 6. Pain (acute) 7. Skin Integrity Cognitive Skills Affected (resulting in the inability to perform in a timely and safe manner): 1. Sustained Attention 2. Divided Attention Psychosocial Skills Affected: 1. Habits/Routines 2. Environmental Adaptation 3. Social Interaction Number of elements that affect the Plan of Care: 5+:  HIGH COMPLEXITY CLINICAL DECISION MAKIN[address] [place] [address] AM-PAC 6 Clicks Daily Activity Inpatient Short Form How much help from another person does the patient currently need. .. Total A Lot A Little None 1. Putting on and taking off regular lower body clothing? [] 1   [x] 2   [] 3   [] 4  
2. Bathing (including washing, rinsing, drying)? [] 1   [x] 2   [] 3   [] 4  
3. Toileting, which includes using toilet, bedpan or urinal?   [] 1   [x] 2   [] 3   [] 4  
4. Putting on and taking off regular upper body clothing? [] 1   [] 2   [x] 3   [] 4  
5. Taking care of personal grooming such as brushing teeth? [] 1   [] 2   [x] 3   [] 4  
6. Eating meals? [] 1   [] 2   [x] 3   [] 4  
© , Trustees of [address] [place] [address], under license to [employer]. All rights reserved Score:  Initial: 15, completed, 2/15/2020 Most Recent: X (Date: -- ) Interpretation of Tool:  Represents activities that are increasingly more difficult (i.e. Bed mobility, Transfers, Gait). Medical Necessity:    
· Skilled intervention continues to be required due to decreased independence, safety, and activity tolerance. In addition, pt having increased dizziness that is limiting pt ability to perform ADLs and functional mobility. May Cisneros Reason for Services/Other Comments: 
· Patient continues to require skilled intervention due to medical complications and patient unable to attend/participate in therapy as expected. Use of outcome tool(s) and clinical judgement create a POC that gives a: MODERATE COMPLEXITY  
 
 
 
TREATMENT:  
(In addition to Assessment/Re-Assessment sessions the following treatments were rendered) Pre-treatment Symptoms/Complaints: \"This is something new that's been happening since I've been short of breath. \" In regards to pt having dizzy spells. Pain: Initial:  
Pain Intensity 1: 10 
Pain Location 1: Buttocks  Post Session:  Unchanged Self Care: (25 minutes): Procedure(s) utilized to improve and/or restore self-care/home management as related to dressing, grooming and self feeding. Required moderate visual and verbal cueing to facilitate activities of daily living skills and compensatory activities. Pt Max A to don/doff sock due to increased dizziness. Pt educated on leg cross-method to increase independence and safety. Pt performs functional transfer from sit to stand with CGA and use of RW to attempt functional transfer from bed to sink for grooming. Pt becomes dizzy upon standing and requires return to sit edge of bed. Pt educated on pursed-lip breathing technique to decrease SOB and increase activity tolerance for performance of ADLs. Pt requires Min A for feeding to doff paper from straw and uncover lid for drinking from cup. Pt presents with tremors in bilateral hands when bringing cup to mouth. Braces/Orthotics/Lines/Etc:  
· Telemetry Monitor · O2 Device: Nasal cannula Treatment/Session Assessment:   
· Response to Treatment:  Pt attempted to participate in therapy the best he could despite increased dizziness. · Interdisciplinary Collaboration:  
o Occupational Therapist 
o Registered Nurse 
o Certified Nursing Assistant/Patient Care Technician · After treatment position/precautions:  
o Supine in bed 
o Bed alarm/tab alert on 
o Bed/Chair-wheels locked 
o Bed in low position 
o Call light within reach 
o RN notified · Compliance with Program/Exercises: Compliant most of the time, Will assess as treatment progresses. · Recommendations/Intent for next treatment session: \"Next visit will focus on advancements to more challenging activities and reduction in assistance provided\". Total Treatment Duration: OT Patient Time In/Time Out Time In: 9416 Time Out: 3541 Yoselin Chaudhary

## 2020-02-15 NOTE — PROGRESS NOTES
Warfarin dosing per pharmacist 
 
Jase Mckeon is a 69 y.o. male. Height: 5' 10\" (177.8 cm)    Weight: 99.6 kg (219 lb 9.6 oz) Indication:  AVR and afib Goal INR:  2.5 - 3.5 Home dose:  2.5 mg daily Risk factors or significant drug interactions:  Levofloxacin (2/9), amiodarone dc'd (2/5) Other anticoagulants:  N/A Daily Monitoring Date  INR     Warfarin dose HGB              Notes 2/14  2.4  3 mg  8.7 2/15  2.2  4 mg  9.6 Pharmacy consulted to assist with warfarin dosing. INR therapeutic on admission. Pt just discharged yesterday. Note DDIs of levofloxacin; amiodarone was dc'd on 2/5. Warfarin initiated during last admission, 2/4, while patient was on argatroban. Appropriate dose for this patient not yet clear as argatroban was falsely elevating INR at that time. INR remains subtherapeutic. Will increase dose to 4 mg this evening. Will continue to follow daily INR and adjust regimen as clinically indicated. Thank you, Leandra Rodgers, PharmD, [facility] Clinical Pharmacist 
[phone]

## 2020-02-15 NOTE — PHYSICIAN ADVISORY
[facility] Physician Advisor Recommendation The information in this document is a recommendation to be used for utilization review and utilization management purposes only. This recommendation is not an order. The recommendation is made based on the information reviewed at the time of the referral, is pursuant to the [facility] Conditions of Participation (42 CFR Part 482), and is neither a judgment nor an assessment with regard to the appropriateness or quality of clinical care. Nothing in this document may be used to limit, alter, or affect clinical services provided to the patient named below. The provider of services is ultimately responsible for the submission of a claim that has met all requirements for correct coding, billing, and reimbursement. Letter of Status Determination:  
Recommend hospitalization status upgraded from OBSERVATION  to INPATIENT  Status Pt Name:  Jim Gómez MR#  
 [id number] / 
Payor: Sally Marinelli / Plan: Medina Rose / Product Type: [employer] Medicare /   
[employer]#  [id number] Room and Hospital  334/01  @ [address] [place] [address] Hospitalization date  2/13/2020  3:03 PM  
Current Attending Physician  Bethel Morrissey MD  
Principal diagnosis  Acute respiratory failure ([facility] [address] [J96.00] Clinicals  69 y.o.  male hospitalized with above diagnosis Plan: 
Acute on chronic hypoxic resp failure/pneumonia previous admission still on outpatient abx CT chest r/o PE, appears unchanged from last admission, DC 2/12 Pulmonary following Wean O2 as tolerated Continue levaquin every 48 hours for 4 more doses 
  
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 
  
CAD s/p CABG s/p AVR Stable Continue current regimen 
  
HTN Stable 
  
DM II Continue home regimen Add SSI 
  
ESRD on HD Nephrology following Had HD 02/14 and again 02/15  
  
  
   
 STATUS DETERMINATION  Upgrade to inpatient Additional comments Payor: Adrian Stafford / Plan: [address] [place] [id number] / Product Type: Managed Care Medicare /   
  
Sanchez Ospina M.D.,  [place] [id number] [facility] T: [phone]

## 2020-02-15 NOTE — ROUTINE PROCESS
Shift change report received from Henrique [address] [place] [address] (off going nurse). Plan of care reviewed with patient at bedside. Opportunity to ask questions provided. Denies needs at this time. Bed low and locked with call light within reach. Patient instructed to call for assistance. Patient verbalized understanding.

## 2020-02-15 NOTE — PROGRESS NOTES
Pulmonary Daily Progress NOTE Avani Trujillo 2/15/2020 Patient is a 69 y.o.  male seen and evaluated at the request of Dr. Shwetha Valdes for the evaluation of respiratory failure. He was just discharged on 2/12 to [facility] and came right back the next day. .Was DC 2/12/20 from CABG and aortic valve replacement complicated by CKD requiring HD, HIT +, DVTs, wound left thigh on wound vac, pneumonia DC on levaquin Q 48 hours last dose 2/11/20. He was DC on 2 L O2 however has been increased to 4L O2 via NC at STR yesterday when he arrived. he says he reports that during HD he felt dizzy  And had acute onset of SOB. He reports today he get SOB with exertion. HE is now on 4L NC with reported Spo2 97 %. He had chest CT with contrast- no PE, still LLL  infiltate with small effusion Date of Admission:  2/13/2020 The patient's chart is reviewed and the patient is discussed with the staff. Subjective:  
 
Patient now s/p HD. Awake and alert. Still feels not taking the deepest breath on 3 LPM. Reports always resting on the left side since trying to let his buttock heal after ulcer during prior hospitalization. Review of Systems:  
-Fever 
-Headaches 
-Chest pain 
-Dyspnea, -wheezing, -cough 
-Abdominal pain,- constipation 
-Leg swelling +black toes with some improvement in hands. All other organ systems grossly normal. 
 
Patient Active Problem List  
Diagnosis Code  DM type 2 (diabetes mellitus, type 2) ([facility]) E11.9  
 Gout M10.9  
 HTN (hypertension) I10  
 BPH (benign prostatic hyperplasia) N40.0  Seasonal allergic rhinitis J30.2  
 HLD (hyperlipidemia) E78.5  Nonrheumatic aortic valve stenosis I35.0  Obesity, morbid ([facility] [address] E66.01  
 Type 2 diabetes with nephropathy (HCC) E11.21  
 Bilateral carotid artery stenosis I65.23  
 Aortic valve stenosis I35.0  
 CAD (coronary artery disease) I25.10  Weaning from respirator [facility]) Z99.11  
  Acute hypoxemic respiratory failure ([facility]) J96.01  
 S/P CABG x 3 Z95.1  
 S/P AVR Z95.2  Acute renal failure (ARF) ([facility]) N17.9  Atrial fibrillation ([address] I48.91  Shock ([address] R57.9  Bilateral pneumothorax J93.9  Thrombocytopenia ([facility]) D69.6  
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  
 Pleural effusion J90  
 Acute respiratory failure ([facility]) J96.00  
 HCAP (healthcare-associated pneumonia) J18.9 Loletta Nunnery Prior to Admission Medications Prescriptions Last Dose Informant Patient Reported? Taking? ALPRAZolam (XANAX) 0.25 mg tablet   No No  
Sig: Take 1 Tab by mouth three (3) times daily as needed for Anxiety. Max Daily Amount: 0.75 mg.  
albuterol (PROVENTIL HFA) 90 mcg/actuation inhaler   No No  
Sig: Take 2 Puffs by inhalation every six (6) hours as needed for Wheezing. allopurinol (ZYLOPRIM) 300 mg tablet   No No  
Sig: Take 1 Tab by mouth daily. ascorbic acid (VITAMIN C) 500 mg tablet   Yes No  
Sig: Take 1,000 mg by mouth daily. busPIRone (BUSPAR) 10 mg tablet   No No  
Sig: Take 1 Tab by mouth three (3) times daily. coenzyme q10-vitamin e (COQ10 ) 100-100 mg-unit cap   Yes No  
Sig: Take 300 mg by mouth daily. insulin glargine (LANTUS) 100 unit/mL injection   No No  
Si Units by SubCUTAneous route nightly. insulin glargine (LANTUS) 100 unit/mL injection   No No  
Si Units by SubCUTAneous route daily. levoFLOXacin (LEVAQUIN) 500 mg tablet   No No  
Sig: Take 1 Tab by mouth every fourty-eight (48) hours for 4 doses. loperamide (IMODIUM) 2 mg capsule   Yes No  
Sig: Take 1 Cap by mouth every four (4) hours as needed for Diarrhea.  
lutein 20 mg tab   Yes No  
Sig: Take 1 tablet by mouth daily. mirtazapine (REMERON) 15 mg tablet   No No  
Sig: Take 1 Tab by mouth nightly. multivitamins-minerals-lutein (CENTRUM SILVER) Tab   Yes No  
Sig: Take 1 tablet by mouth daily.   
traMADol (ULTRAM) 50 mg tablet   No No  
 Sig: Take 1 Tab by mouth every six (6) hours as needed for Pain for up to 30 days. Max Daily Amount: 200 mg.  
warfarin (COUMADIN) 2.5 mg tablet   No No  
Sig: Take 1 Tab by mouth every evening. Facility-Administered Medications: None Past Medical History:  
Diagnosis Date  Arthritis  BPH (benign prostatic hyperplasia) 1/14/2013  CAD (coronary artery disease)  DM type 2 (diabetes mellitus, type 2) ([facility] [address] dx 2004 Type 2, avg fbs , recognizes hypo at 67, last HA1C was 9.2 on 5/2014  Dyspnea   
 at times, Uses Albuterol inhaler when needed (last used first of aug 2014)  Gout 1/14/2013  HLD (hyperlipidemia) 1/14/2013  
 HTN (hypertension)   
 controlled with meds  Morbid obesity ([facility] [address] 9/3/14 BMI 41.7  Psychiatric disorder   
 anxiety, controlled with buspar  Rheumatic fever   
 as a child  Seasonal allergic rhinitis   
 treated with Allegra  Severe aortic valve stenosis   
 echo 09/11/19 EF 60-65%- mild to moderate regurgitation  Thyroid disease   
 hx- no longer takes synthroid  Unspecified sleep apnea 2016  
 bi pap Past Surgical History:  
Procedure Laterality Date  HX CATARACT REMOVAL Bilateral 2012  HX HEART CATHETERIZATION  12/23/2019  HX ORTHOPAEDIC Left   
 carpal tunnel  HX TONSIL AND ADENOIDECTOMY  IR INSERT TUNL CVC W PORT OVER 5 YEARS  2/4/2020 Social History Socioeconomic History  Marital status:  Spouse name: Not on file  Number of children: Not on file  Years of education: Not on file  Highest education level: Not on file Occupational History  Not on file Social Needs  Financial resource strain: Not on file  Food insecurity:  
  Worry: Not on file Inability: Not on file  Transportation needs:  
  Medical: Not on file Non-medical: Not on file Tobacco Use  Smoking status: Never Smoker  Smokeless tobacco: Never Used Substance and Sexual Activity  Alcohol use: Yes Alcohol/week: 0.0 standard drinks Comment: rarely  Drug use: Never  Sexual activity: Not on file Lifestyle  Physical activity:  
  Days per week: Not on file Minutes per session: Not on file  Stress: Not on file Relationships  Social connections:  
  Talks on phone: Not on file Gets together: Not on file Attends Orthodox service: Not on file Active member of club or organization: Not on file Attends meetings of clubs or organizations: Not on file Relationship status: Not on file  Intimate partner violence:  
  Fear of current or ex partner: Not on file Emotionally abused: Not on file Physically abused: Not on file Forced sexual activity: Not on file Other Topics Concern  Not on file Social History Narrative  Not on file Family History Problem Relation Age of Onset  Lung Disease Mother   
     copd  Cancer Father   
     lympnode cancer  No Known Problems Brother  Cancer Other   
     fmh lymphoma  Diabetes Other   
     fmhx  Diabetes Maternal Grandfather Allergies Allergen Reactions  Heparin Other (comments) HIT antibody test strongly positive on 1/17/2020. SHARON drawn 1/18/2020 and resulted positive on 1/21/2020  Amoxicillin Rash  Keflex [Cephalexin] Rash  Pcn [Penicillins] Other (comments) \"felt closed in\". No rash Current Facility-Administered Medications Medication Dose Route Frequency  warfarin (COUMADIN) tablet 4 mg  4 mg Oral QPM  
 albuterol (PROVENTIL VENTOLIN) nebulizer solution 2.5 mg  2.5 mg Nebulization Q6H RT  
 sodium chloride 3% hypertonic nebulizer soln  4 mL Nebulization BID RT  
 pantothenic ac-min oil-pet,hyd (AQUAPHOR) 41 % ointment   Topical DAILY  busPIRone (BUSPAR) tablet 10 mg  10 mg Oral TID  insulin glargine (LANTUS) injection 18 Units  18 Units SubCUTAneous QHS  insulin glargine (LANTUS) injection 28 Units  28 Units SubCUTAneous DAILY  mirtazapine (REMERON) tablet 15 mg  15 mg Oral QHS  traMADol (ULTRAM) tablet 50 mg  50 mg Oral Q6H PRN  
 sodium chloride (NS) flush 5-40 mL  5-40 mL IntraVENous Q8H  
 sodium chloride (NS) flush 5-40 mL  5-40 mL IntraVENous PRN  
 acetaminophen (TYLENOL) tablet 650 mg  650 mg Oral Q4H PRN  
 naloxone (NARCAN) injection 0.4 mg  0.4 mg IntraVENous PRN  
 ondansetron (ZOFRAN) injection 4 mg  4 mg IntraVENous Q4H PRN  
 bisacodyL (DULCOLAX) tablet 5 mg  5 mg Oral DAILY PRN  
 insulin regular (NOVOLIN R, HUMULIN R) injection   SubCUTAneous AC&HS  levoFLOXacin (LEVAQUIN) 500 mg in D5W IVPB  500 mg IntraVENous Q48H Objective:  
 
Vitals:  
 02/15/20 1100 02/15/20 1126 02/15/20 1158 02/15/20 1236 BP: 91/65 114/76 115/78 (!) 97/33 Pulse: 67 76 81 85 Resp:      
Temp:      
SpO2:      
Weight:      
Height:      
 
Blood pressure (!) 97/33, pulse 85, temperature 98.2 °F (36.8 °C), resp. rate 18, height 5' 10\" (1.778 m), weight 219 lb 9.6 oz (99.6 kg), SpO2 98 %. PHYSICAL EXAM  
 
Constitutional:  the patient is well developed and in no acute distress EENMT:  Sclera clear, pupils equal, oral mucosa moist 
Respiratory: decreased BS more in left lung base. Cardiovascular:  RRR without M,G,R 
Gastrointestinal: soft and non-tender; with positive bowel sounds. Musculoskeletal: warm without cyanosis. There is no lower extremity edema. Noted finger on left hand now pink and only one in index finger or right hand that is black. Toes wrapped and cannot check Skin:  no jaundice or rashes, no wounds Neurologic: no gross neuro deficits Psychiatric:  alert and oriented x 3 Chest CT  
 
:  
 
 
Recent Labs  
  02/15/20 
0349 02/14/20 
0504 02/13/20 
1544 WBC 9.1 7.3 10.1 HGB 9.6* 8.7* 9.8* HCT 31.5* 28.6* 31.8*  
* 167 142* INR 2.2 2.4 2.4 Recent Labs  
  02/15/20 
0349 02/14/20 
0504 02/13/20 1544  
 138 137  
K 3.9 4.1 4.1  102 100 * 163* 155* CO2 29 28 29 BUN 30* 38* 30* CREA 3.67* 4.22* 3.78* CA 7.8* 7.7* 8.1*  
TROIQ  --   --  <0.02* ALB  --  1.8* 2.1* TBILI  --  0.4 0.5 ALT  --  33 45 SGOT  --  32 43* No results for input(s): PH, PCO2, PO2, HCO3, PHI, PCO2I, PO2I, HCO3I in the last 72 hours. Recent Labs  
  02/13/20 
1544 LAC 2.0 Assessment:  (Medical Decision Making) Hospital Problems  Date Reviewed: 2/9/2020 Codes Class Noted POA  
 CAD (coronary artery disease) ICD-10-CM: I25.10 ICD-9-CM: 414.00  1/10/2020 Yes S/P CABG x 3 ICD-10-CM: Z95.1 ICD-9-CM: V45.81  1/10/2020 Yes S/P AVR ICD-10-CM: Z95.2 ICD-9-CM: V43.3  1/10/2020 Yes HCAP (healthcare-associated pneumonia) ICD-10-CM: J18.9 ICD-9-CM: 419  2/14/2020 Unknown Acute respiratory failure ([facility] ICD-10-CM: J96.00 
ICD-9-CM: 518.81  2/13/2020 Unknown Atrial fibrillation ([facility] ICD-10-CM: I48.91 
ICD-9-CM: 427.31  1/13/2020 Yes * (Principal) Acute hypoxemic respiratory failure ([facility] ICD-10-CM: J96.01 
ICD-9-CM: 518.81  1/10/2020 Yes DM type 2 (diabetes mellitus, type 2) (HCC) ICD-10-CM: E11.9 ICD-9-CM: 250.00  1/14/2013 Yes HTN (hypertension) ICD-10-CM: I10 
ICD-9-CM: 401.9  1/14/2013 Yes HLD (hyperlipidemia) ICD-10-CM: E78.5 ICD-9-CM: 272.4  1/14/2013 Yes 69 y old just discharged ,was treated for PNA with levaquin ,not finished yet. Chest CT unchanged with left base infiltrates with small effusion. Plan:  (Medical Decision Making) -- I would continue current ABX- his chest CT is not changed but it has been only 4 days since prior CT and he only took 5 days of levaquin would continue for 8 days total 
--continue nebs with flutter valve. IF not expectorating by Monday consider need for bronch. --continue 3% saline as well. 
--add IS 
--L effusion is too small for tap - does not appear anything acute has happened 
- PT/OT 
--continue remaining treatment. Told him marked improvement since he was in the CVICU prior. More than 50% of the time documented was spent in face-to-face contact with the patient and in the care of the patient on the floor/unit where the patient is located.  
  
 
Astrid Dubose MD

## 2020-02-15 NOTE — PROGRESS NOTES
Louisa Fernandez Admission Date: 2/13/2020 Renal Daily Progress Note: 2/15/2020 Follow up ESRD Mr. Zaheer Salas is a 69 y.o male readmitted under observation with c/o SOB. Seen on dialysis Subjective:  
Patient seen and examined on dialysis ROS: He denies CP, N/V/D, fever/ chills, appetite ok. Current Facility-Administered Medications Medication Dose Route Frequency  warfarin (COUMADIN) tablet 4 mg  4 mg Oral QPM  
 albuterol (PROVENTIL VENTOLIN) nebulizer solution 2.5 mg  2.5 mg Nebulization Q6H RT  
 sodium chloride 3% hypertonic nebulizer soln  4 mL Nebulization BID RT  
 pantothenic ac-min oil-pet,hyd (AQUAPHOR) 41 % ointment   Topical DAILY  busPIRone (BUSPAR) tablet 10 mg  10 mg Oral TID  insulin glargine (LANTUS) injection 18 Units  18 Units SubCUTAneous QHS  insulin glargine (LANTUS) injection 28 Units  28 Units SubCUTAneous DAILY  mirtazapine (REMERON) tablet 15 mg  15 mg Oral QHS  traMADol (ULTRAM) tablet 50 mg  50 mg Oral Q6H PRN  
 sodium chloride (NS) flush 5-40 mL  5-40 mL IntraVENous Q8H  
 sodium chloride (NS) flush 5-40 mL  5-40 mL IntraVENous PRN  
 acetaminophen (TYLENOL) tablet 650 mg  650 mg Oral Q4H PRN  
 naloxone (NARCAN) injection 0.4 mg  0.4 mg IntraVENous PRN  
 ondansetron (ZOFRAN) injection 4 mg  4 mg IntraVENous Q4H PRN  
 bisacodyL (DULCOLAX) tablet 5 mg  5 mg Oral DAILY PRN  
 insulin regular (NOVOLIN R, HUMULIN R) injection   SubCUTAneous AC&HS  levoFLOXacin (LEVAQUIN) 500 mg in D5W IVPB  500 mg IntraVENous Q48H Objective:  
 
Vitals:  
 02/15/20 0958 02/15/20 1034 02/15/20 1100 02/15/20 1126 BP: (!) 130/91 105/71 91/65 114/76 Pulse: 72 90 67 76 Resp:      
Temp:      
SpO2:      
Weight:      
Height:      
 
Intake and Output:  
02/13 1901 - 02/15 0700 In: 900 [P.O.:900] Out: 1455 [IWFIR:837] No intake/output data recorded. Physical Exam: Constitutional:  the patient is well developed and in no acute distress HEENT:  Sclera clear, pupils equal, oral mucosa moist 
Lungs: diminished lung sounds bilaterally, no wheezes Cardiovascular:  Regular rate, S1, S2, No Rub Abd/GI: soft and non-tender; with positive bowel sounds. Ext: warm without cyanosis. There is trace lower leg edema. Skin:  no jaundice or rashes Neuro: no gross neuro deficits Psychiatric: Calm. Access: Left TCC- intact LAB Recent Labs  
  02/15/20 
0349 02/14/20 
0504 02/13/20 
1544 WBC 9.1 7.3 10.1 HGB 9.6* 8.7* 9.8* HCT 31.5* 28.6* 31.8*  
* 167 142* INR 2.2 2.4 2.4 Recent Labs  
  02/15/20 
0349 02/14/20 
0504 02/13/20 
1544  138 137  
K 3.9 4.1 4.1  102 100 CO2 29 28 29 * 163* 155* BUN 30* 38* 30* CREA 3.67* 4.22* 3.78* ALB  --  1.8* 2.1*  
SGOT  --  32 43* No results for input(s): PH, PCO2, PO2, HCO3 in the last 72 hours. Assessment:  (Medical Decision Making) Hospital Problems  Date Reviewed: 2/9/2020 Codes Class Noted POA HCAP (healthcare-associated pneumonia) ICD-10-CM: J18.9 ICD-9-CM: 868  2/14/2020 Unknown Acute respiratory failure ([facility] ICD-10-CM: J96.00 
ICD-9-CM: 518.81  2/13/2020 Unknown Atrial fibrillation ([facility] ICD-10-CM: I48.91 
ICD-9-CM: 427.31  1/13/2020 Yes CAD (coronary artery disease) ICD-10-CM: I25.10 ICD-9-CM: 414.00  1/10/2020 Yes * (Principal) Acute hypoxemic respiratory failure ([facility] ICD-10-CM: J96.01 
ICD-9-CM: 518.81  1/10/2020 Yes S/P CABG x 3 ICD-10-CM: Z95.1 ICD-9-CM: V45.81  1/10/2020 Yes S/P AVR ICD-10-CM: Z95.2 ICD-9-CM: V43.3  1/10/2020 Yes DM type 2 (diabetes mellitus, type 2) (HCC) ICD-10-CM: E11.9 ICD-9-CM: 250.00  1/14/2013 Yes HTN (hypertension) ICD-10-CM: I10 
ICD-9-CM: 401.9  1/14/2013 Yes HLD (hyperlipidemia) ICD-10-CM: E78.5 ICD-9-CM: 272.4  1/14/2013 Yes Plan:  (Medical Decision Making) 1. DEJUAN/CKD recently d/c DEJUAN with outpatient HD TTS 
-HD for volume and clearance 2. 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 S/P CABG 5. Anemia 6. Disposition out pt Rehab once stable from hypoxia; Next HD TTS Charley Quesada MD

## 2020-02-15 NOTE — DIALYSIS
TRANSFER OUT- DIALYSIS Hemodialysis treatment completed without complications. Patient alert and VS stable  /49  P 85   
 
 2 Kgs removed. Flushed both ports with 10 mL of NS.  CVC dressing clean, dry, and intact, tego caps intact, bilateral lumens wrapped with 4x4 gauze. Patient to ultrasound after dialysis.

## 2020-02-16 NOTE — PROGRESS NOTES
Progress Note 2020 Admit Date: 2020  3:03 PM  
NAME: Jase Mckeon :  1945 MRN:  [id number] Attending: Yolie Cummins MD 
PCP:  Windy Pagan MD 
Treatment Team: Attending Provider: Yolie Cummins MD; Nurse Practitioner: Teetee Barnett NP; Consulting Provider: Hitesh Denney MD; Consulting Provider: Jesus Jung MD; Utilization Review: Reji Cruz RN; Care Manager: Maggie Lyman; Primary Nurse: Dallin Ingram DNR SUBJECTIVE:  
Mr. 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  Was DC 20 from CABG and aortic valve replacement complicated by CKD requiring HD, HIT +, DVTs, wound left thigh on wound vac, pneumonia DC on levaquin Q 48 hours.  He was DC on 2 L O2 however has been increased to 4L O2 via NC at STR when he arrived. On 3 L NC O2 now.    he was at HD and reports dizziness with sitting up and acute onset of SOB.    CXR showed improving interstitial edema, unchanged left basilar opacity and left pleural effusion. Pt given lasix in ED. Chest CT showed unchanged in general, no PE. levaquin continued on admission EOT 20. Pt does have sacral wound present on admission. Wound care consulted. Pulmonary consulted. Potential need for bronch Monday if not expectorating. Pt states he feels no improvement. However he appears improved from my admission. He is no longer SOB when talking. Overall weak. Past Medical History:  
Diagnosis Date  Arthritis  BPH (benign prostatic hyperplasia) 2013  CAD (coronary artery disease)  DM type 2 (diabetes mellitus, type 2) ([facility] dx  Type 2, avg fbs , recognizes hypo at 67, last HA1C was 9.2 on 2014  Dyspnea at times, Uses Albuterol inhaler when needed (last used first of aug 2014)  Gout 1/14/2013  HLD (hyperlipidemia) 1/14/2013  
 HTN (hypertension)   
 controlled with meds  Morbid obesity ([address] 9/3/14 BMI 41.7  Psychiatric disorder   
 anxiety, controlled with buspar  Rheumatic fever   
 as a child  Seasonal allergic rhinitis   
 treated with Allegra  Severe aortic valve stenosis   
 echo 09/11/19 EF 60-65%- mild to moderate regurgitation  Thyroid disease   
 hx- no longer takes synthroid  Unspecified sleep apnea 2016  
 bi pap Recent Results (from the past 24 hour(s)) GLUCOSE, POC Collection Time: 02/15/20 10:10 AM  
Result Value Ref Range Glucose (POC) 187 (H) 65 - 100 mg/dL GLUCOSE, POC Collection Time: 02/15/20  4:20 PM  
Result Value Ref Range Glucose (POC) 232 (H) 65 - 100 mg/dL GLUCOSE, POC Collection Time: 02/15/20  9:04 PM  
Result Value Ref Range Glucose (POC) 198 (H) 65 - 100 mg/dL PROTHROMBIN TIME + INR Collection Time: 02/16/20  4:35 AM  
Result Value Ref Range Prothrombin time 23.8 (H) 12.0 - 14.7 sec INR 2.1 METABOLIC PANEL, BASIC Collection Time: 02/16/20  4:35 AM  
Result Value Ref Range Sodium 137 136 - 145 mmol/L Potassium 4.0 3.5 - 5.1 mmol/L Chloride 102 98 - 107 mmol/L  
 CO2 27 21 - 32 mmol/L Anion gap 8 7 - 16 mmol/L Glucose 176 (H) 65 - 100 mg/dL BUN 26 (H) 8 - 23 MG/DL Creatinine 3.59 (H) 0.8 - 1.5 MG/DL  
 GFR est AA 22 (L) >60 ml/min/1.73m2 GFR est non-AA 18 (L) >60 ml/min/1.73m2 Calcium 7.6 (L) 8.3 - 10.4 MG/DL  
CBC W/O DIFF Collection Time: 02/16/20  4:35 AM  
Result Value Ref Range WBC 9.5 4.3 - 11.1 K/uL  
 RBC 3.32 (L) 4.23 - 5.6 M/uL HGB 9.8 (L) 13.6 - 17.2 g/dL HCT 31.3 (L) 41.1 - 50.3 % MCV 94.3 79.6 - 97.8 FL  
 MCH 29.5 26.1 - 32.9 PG  
 MCHC 31.3 (L) 31.4 - 35.0 g/dL  
 RDW 14.3 11.9 - 14.6 % PLATELET 381 (L) 243 - 450 K/uL  MPV 10.1 9.4 - 12.3 FL  
 ABSOLUTE NRBC 0.00 0.0 - 0.2 K/uL GLUCOSE, POC Collection Time: 02/16/20  6:06 AM  
Result Value Ref Range Glucose (POC) 162 (H) 65 - 100 mg/dL Allergies Allergen Reactions  Heparin Other (comments) HIT antibody test strongly positive on 1/17/2020. SHARON drawn 1/18/2020 and resulted positive on 1/21/2020  Amoxicillin Rash  Keflex [Cephalexin] Rash  Pcn [Penicillins] Other (comments) \"felt closed in\". No rash Current Facility-Administered Medications Medication Dose Route Frequency Provider Last Rate Last Dose  warfarin (COUMADIN) tablet 4 mg  4 mg Oral QPM Laura Olmstead MD   4 mg at 02/15/20 1730  
 albuterol (PROVENTIL VENTOLIN) nebulizer solution 2.5 mg  2.5 mg Nebulization Q6H RT Maria Del Carmen Banks MD   2.5 mg at 02/16/20 3225  sodium chloride 3% hypertonic nebulizer soln  4 mL Nebulization BID RT Verdis Mortimer, MD   4 mL at 02/16/20 2855  pantothenic ac-min oil-pet,hyd (AQUAPHOR) 41 % ointment   Topical DAILY Isabel Maribell B, NP      
 busPIRone (BUSPAR) tablet 10 mg  10 mg Oral TID Isabel Maribell B, NP   10 mg at 02/16/20 8987  insulin glargine (LANTUS) injection 18 Units  18 Units SubCUTAneous QHS Isabel Maribell B, NP   18 Units at 02/15/20 2203  insulin glargine (LANTUS) injection 28 Units  28 Units SubCUTAneous DAILY Eden Sawyer NP   28 Units at 02/16/20 0687  mirtazapine (REMERON) tablet 15 mg  15 mg Oral QHS Isabel Maribell B, NP   15 mg at 02/15/20 2203  traMADol (ULTRAM) tablet 50 mg  50 mg Oral Q6H PRN Eden Sawyer NP      
 sodium chloride (NS) flush 5-40 mL  5-40 mL IntraVENous Q8H Isabel Maribell B, NP   10 mL at 02/16/20 3996  sodium chloride (NS) flush 5-40 mL  5-40 mL IntraVENous PRN Eden Sawyer NP      
 acetaminophen (TYLENOL) tablet 650 mg  650 mg Oral Q4H PRN Edne Digna, NP      
 naloxone [facility]) injection 0.4 mg  0.4 mg IntraVENous PRN Eden Sawyer NP      
  ondansetron (ZOFRAN) injection 4 mg  4 mg IntraVENous Q4H PRN Devin Lin NP      
 bisacodyL (DULCOLAX) tablet 5 mg  5 mg Oral DAILY PRN Devin Lin NP      
 insulin regular (NOVOLIN R, HUMULIN R) injection   SubCUTAneous AC&HS Devin Lin NP   2 Units at 20 4017  levoFLOXacin (LEVAQUIN) 500 mg in D5W IVPB  500 mg IntraVENous Q48H Yolanda HOUSER,  mL/hr at 20 2217 500 mg at 20 2217 Review of Systems negative with exception of pertinent positives noted above PHYSICAL EXAM  
 
Visit Vitals /57 (BP 1 Location: Right arm, BP Patient Position: At rest) Pulse 75 Temp 97.2 °F (36.2 °C) Resp 22 Ht 5' 10\" (1.778 m) Wt 99.8 kg (220 lb 0.3 oz) SpO2 97% BMI 31.57 kg/m² Temp (24hrs), Av.2 °F (36.8 °C), Min:97.1 °F (36.2 °C), Max:100.2 °F (37.9 °C) Oxygen Therapy O2 Sat (%): 97 % (20 0843) Pulse via Oximetry: 77 beats per minute (20 0759) O2 Device: Nasal cannula (20 0818) O2 Flow Rate (L/min): 3 l/min (20 0818) Intake/Output Summary (Last 24 hours) at 2020 0915 Last data filed at 2/15/2020 1654 Gross per 24 hour Intake 240 ml Output 120 ml Net 120 ml General:       Alert, cooperative, appears stated age. Marifer Richters, without obvious abnormality, atraumatic. Nose:            Nares normal. No drainage or sinus tenderness. Lungs:          Diminished bases.  Otherwise CTA bilaterally.  Resp labored Heart:            S1S2 present with +murmur. No rubs or gallops. trace LE edema Abdomen:    Soft, non-tender. Not distended.  Bowel sounds normal. No masses Extremities: Wound vac to left inner thigh. Necrosis to fingers and toes post high dose pressors  
Skin:             Not jaundiced.  Surgical incision midsternal C/D/I, well approximated, non drainage Neurologic:  Alert and oriented X3. No focal deficits Results summary of Diagnostic Studies/Procedures copied from within [facility] EMR: 
 
 
 
ASSESSMENT Active Hospital Problems Diagnosis Date Noted  Fluid overload 02/15/2020  HCAP (healthcare-associated pneumonia) 02/14/2020  [facility] 02/13/2020  Atrial fibrillation ([facility] 01/13/2020  Acute hypoxemic respiratory failure ([facility] 01/10/2020  CAD (coronary artery disease) 01/10/2020  S/P AVR 01/10/2020  S/P CABG x 3 01/10/2020  DM type 2 (diabetes mellitus, type 2) ([facility] 01/14/2013  HLD (hyperlipidemia) 01/14/2013  
 HTN (hypertension) 01/14/2013 Plan: 
Acute on chronic hypoxic resp failure/pneumonia previous admission still on outpatient abx CT chest r/o PE, appears unchanged from last admission, DC 2/12 Pulmonary following Wean O2 as tolerated Continue levaquin every 48 hours total 8 days EOT 2/20/20 Flutter valve, if not expectorating by Monday consider need for Bronch Left effusion to small to tap 
  
DVT Continue coumadin INR 2.1, pharmacy to dose HIT + last admission 
  
CAD s/p CABG s/p AVR Stable Continue current regimen 
  
HTN Stable 
  
DM II Continue home regimen Add SSI 
  
ESRD on HD Nephrology following Had HD today 
  
  
Notes, labs, VS, diagnostic testing reviewed Time spent with pt 35 min 
  
 
DVT Prophylaxis: coumdain Plan of Care Discussed with: Supervising MD Dr. Wayne Vargas, care team, pt Rah Mares, MCKAY

## 2020-02-16 NOTE — ROUTINE PROCESS
Verbal bedside report given to Lucia Urena, oncoming RN. Patient's situation, background, assessment and recommendations provided. Opportunity for questions provided. Oncoming RN assumed care of patient.

## 2020-02-16 NOTE — PROGRESS NOTES
Nutrition Assessment for:  
Best Practice Alert for Pressure Injury stage 2 or greater Assessment: Admitted with A/chronic hypoxic respiratory failure/pna, DVT, CAD s/p CABG s/p AVR, HTN, DM, ESRD on HD. 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 Pt is a limited historian overall, he frequently states \"I can't remember. \"  He reports difficulties with cutting meats secondary to his hands and asks for chopped meats. DIET DIABETIC CONSISTENT CARB Regular; 2 GM NA (House Low NA) DIET NPO Except Meds Skin Status per Wound Care:  
coccyx gluteal cleft wound, full thickness slough and mild erythema surrounding, demarcating, moderate amount of serosanguinous drainage Left thigh upper wound is one of the vein harvest sites, from CABG - wound vac d/c Anthropometrics: 
Height: 5' 10\" (177.8 cm), Weight: 99.8 kg (220 lb 0.3 oz), Weight Source: Bed, Body mass index is 31.57 kg/m². BMI class of obese. His weight has fluctuated per EMR review but is likely associated with start of HD, fluid status an/or poor po intake. Macronutrient needs: (using IBW (Ideal body weight) 75.5 kg) EER: 8771-6223 kcal/day (25-30 kcal/kg) EPR:  g/day (1.2-1.4 g/kg) Intake/Comparative Standards: Insufficient intake data. Unable to assess if needs are met. Defer assessment until more data available. Nutrition Diagnosis: 
Predicted inadequate oral intake related to inconsistent recall as evidenced by pt waxes and wanes on his recollection of oral intake reporting his intake has varied since HD start and currently without any recorded data for po intake this admission. Nutrition Intervention: 
Meals and snacks: Continue current diet, provide chopped meats as needed per pt request. 
Medical food supplement therapy: Nepro once daily. Discharge Nutrition Plan: Too soon to determine. Organ [place], [address], [place] [address]

## 2020-02-16 NOTE — PROGRESS NOTES
02/16/20 0759 Oxygen Therapy O2 Sat (%) 96 % Pulse via Oximetry 77 beats per minute O2 Device Nasal cannula O2 Flow Rate (L/min) 3 l/min 
(weaned to 2L)

## 2020-02-16 NOTE — PROGRESS NOTES
Warfarin dosing per pharmacist 
 
Wilda Castano is a 69 y.o. male. Height: 5' 10\" (177.8 cm)    Weight: 99.8 kg (220 lb 0.3 oz) Indication:  AVR and afib Goal INR:  2.5 - 3.5 Home dose:  2.5 mg daily Risk factors or significant drug interactions:  Levofloxacin (2/9), amiodarone dc'd (2/5) Other anticoagulants:  N/A Daily Monitoring Date  INR     Warfarin dose HGB              Notes 2/14  2.4  3 mg  8.7 2/15  2.2  4 mg  9.6 2/16  2.1  5 mg  9.8 Pharmacy consulted to assist with warfarin dosing. INR therapeutic on admission. Pt just discharged yesterday. Note DDIs of levofloxacin; amiodarone was dc'd on 2/5. Warfarin initiated during last admission, 2/4, while patient was on argatroban. Appropriate dose for this patient not yet clear as argatroban was falsely elevating INR at that time. INR remains subtherapeutic. Will increase dose to 5 mg this evening. Anticipate pt may require higher daily dosing than 2.5 mg. Will continue to follow daily INR and adjust regimen as clinically indicated. Thank you, Aristeo De Jesus, PharmD, BCPS Clinical Pharmacist 
[phone]

## 2020-02-16 NOTE — PROGRESS NOTES
Pulmonary Daily Progress NOTE Jim Gómez Date of Admission:  2/13/2020 2/16/2020 The patient's chart is reviewed and the patient is discussed with the staff. 
 
 
69 y.o.  male seen and evaluated at the request of Dr. Yessica Cr for the evaluation of respiratory failure. He was just discharged on 2/12 to [facility] and came right back the next day. .Was DC 2/12/20 from CABG and aortic valve replacement complicated by CKD requiring HD, HIT +, DVTs, wound left thigh on wound vac, pneumonia DC on levaquin Q 48 hours last dose 2/11/20. He was DC on 2 L O2 however has been increased to 4L O2 via NC at STR yesterday when he arrived. he says he reports that during HD he felt dizzy  And had acute onset of SOB. He reports today he get SOB with exertion. HE is now on 4L NC with reported Spo2 97 %. He had chest CT with contrast- no PE, still LLL  infiltate with small effusion Subjective:  
 
Patient seen resting in bed. Feels depressed about not being able to be very active without getting short of breath. Review of Systems:  
Review of Systems Constitutional: Negative for chills, fever and weight loss. Respiratory: Negative for cough, hemoptysis and sputum production. Cardiovascular: Negative for chest pain, palpitations and orthopnea. Gastrointestinal: Negative for abdominal pain, nausea and vomiting. Psychiatric/Behavioral: Negative for depression, hallucinations and suicidal ideas. Patient Active Problem List  
Diagnosis Code  DM type 2 (diabetes mellitus, type 2) (HCC) E11.9  
 Gout M10.9  
 HTN (hypertension) I10  
 BPH (benign prostatic hyperplasia) N40.0  Seasonal allergic rhinitis J30.2  
 HLD (hyperlipidemia) E78.5  Nonrheumatic aortic valve stenosis I35.0  Obesity, morbid ([address] E66.01  
 Type 2 diabetes with nephropathy (HCC) E11.21  
 Bilateral carotid artery stenosis I65.23  
 Aortic valve stenosis I35.0  CAD (coronary artery disease) I25.10  Weaning from respirator [facility]) Z99.11  
 Acute hypoxemic respiratory failure ([facility]) J96.01  
 S/P CABG x 3 Z95.1  
 S/P AVR Z95.2  Acute renal failure (ARF) ([facility]) N17.9  Atrial fibrillation ([address] I48.91  Shock ([address] R57.9  Bilateral pneumothorax J93.9  Thrombocytopenia ([facility]) D69.6  
 HIT (heparin-induced thrombocytopenia) ([facility]) D75.82  
 Pleural effusion J90  
 Acute respiratory failure ([facility]) J96.00  
 HCAP (healthcare-associated pneumonia) J18.9  Fluid overload E87.70 Julia Bj Prior to Admission Medications Prescriptions Last Dose Informant Patient Reported? Taking? ALPRAZolam (XANAX) 0.25 mg tablet   No No  
Sig: Take 1 Tab by mouth three (3) times daily as needed for Anxiety. Max Daily Amount: 0.75 mg.  
albuterol (PROVENTIL HFA) 90 mcg/actuation inhaler   No No  
Sig: Take 2 Puffs by inhalation every six (6) hours as needed for Wheezing. allopurinol (ZYLOPRIM) 300 mg tablet   No No  
Sig: Take 1 Tab by mouth daily. ascorbic acid (VITAMIN C) 500 mg tablet   Yes No  
Sig: Take 1,000 mg by mouth daily. busPIRone (BUSPAR) 10 mg tablet   No No  
Sig: Take 1 Tab by mouth three (3) times daily. coenzyme q10-vitamin e (COQ10 ) 100-100 mg-unit cap   Yes No  
Sig: Take 300 mg by mouth daily. insulin glargine (LANTUS) 100 unit/mL injection   No No  
Si Units by SubCUTAneous route nightly. insulin glargine (LANTUS) 100 unit/mL injection   No No  
Si Units by SubCUTAneous route daily. levoFLOXacin (LEVAQUIN) 500 mg tablet   No No  
Sig: Take 1 Tab by mouth every fourty-eight (48) hours for 4 doses. loperamide (IMODIUM) 2 mg capsule   Yes No  
Sig: Take 1 Cap by mouth every four (4) hours as needed for Diarrhea.  
lutein 20 mg tab   Yes No  
Sig: Take 1 tablet by mouth daily. mirtazapine (REMERON) 15 mg tablet   No No  
Sig: Take 1 Tab by mouth nightly.   
multivitamins-minerals-lutein (CENTRUM SILVER) Tab   Yes No  
 Sig: Take 1 tablet by mouth daily. traMADol (ULTRAM) 50 mg tablet   No No  
Sig: Take 1 Tab by mouth every six (6) hours as needed for Pain for up to 30 days. Max Daily Amount: 200 mg.  
warfarin (COUMADIN) 2.5 mg tablet   No No  
Sig: Take 1 Tab by mouth every evening. Facility-Administered Medications: None Past Medical History:  
Diagnosis Date  Arthritis  BPH (benign prostatic hyperplasia) 1/14/2013  CAD (coronary artery disease)  DM type 2 (diabetes mellitus, type 2) ([facility] [address] dx 2004 Type 2, avg fbs , recognizes hypo at 67, last HA1C was 9.2 on 5/2014  Dyspnea   
 at times, Uses Albuterol inhaler when needed (last used first of aug 2014)  Gout 1/14/2013  HLD (hyperlipidemia) 1/14/2013  
 HTN (hypertension)   
 controlled with meds  Morbid obesity ([facility] 9/3/14 BMI 41.7  Psychiatric disorder   
 anxiety, controlled with buspar  Rheumatic fever   
 as a child  Seasonal allergic rhinitis   
 treated with Allegra  Severe aortic valve stenosis   
 echo 09/11/19 EF 60-65%- mild to moderate regurgitation  Thyroid disease   
 hx- no longer takes synthroid  Unspecified sleep apnea 2016  
 bi pap Past Surgical History:  
Procedure Laterality Date  HX CATARACT REMOVAL Bilateral 2012  HX HEART CATHETERIZATION  12/23/2019  HX ORTHOPAEDIC Left   
 carpal tunnel  HX TONSIL AND ADENOIDECTOMY  IR INSERT TUNL CVC W PORT OVER 5 YEARS  2/4/2020 Social History Socioeconomic History  Marital status:  Spouse name: Not on file  Number of children: Not on file  Years of education: Not on file  Highest education level: Not on file Occupational History  Not on file Social Needs  Financial resource strain: Not on file  Food insecurity:  
  Worry: Not on file Inability: Not on file  Transportation needs:  
  Medical: Not on file Non-medical: Not on file Tobacco Use  
  Smoking status: Never Smoker  Smokeless tobacco: Never Used Substance and Sexual Activity  Alcohol use: Yes Alcohol/week: 0.0 standard drinks Comment: rarely  Drug use: Never  Sexual activity: Not on file Lifestyle  Physical activity:  
  Days per week: Not on file Minutes per session: Not on file  Stress: Not on file Relationships  Social connections:  
  Talks on phone: Not on file Gets together: Not on file Attends Restorationism service: Not on file Active member of club or organization: Not on file Attends meetings of clubs or organizations: Not on file Relationship status: Not on file  Intimate partner violence:  
  Fear of current or ex partner: Not on file Emotionally abused: Not on file Physically abused: Not on file Forced sexual activity: Not on file Other Topics Concern  Not on file Social History Narrative  Not on file Family History Problem Relation Age of Onset  Lung Disease Mother   
     copd  Cancer Father   
     lympnode cancer  No Known Problems Brother  Cancer Other   
     fmh lymphoma  Diabetes Other   
     fmhx  Diabetes Maternal Grandfather Allergies Allergen Reactions  Heparin Other (comments) HIT antibody test strongly positive on 1/17/2020. SHARON drawn 1/18/2020 and resulted positive on 1/21/2020  Amoxicillin Rash  Keflex [Cephalexin] Rash  Pcn [Penicillins] Other (comments) \"felt closed in\". No rash Current Facility-Administered Medications Medication Dose Route Frequency  warfarin (COUMADIN) tablet 5 mg  5 mg Oral QPM  
 loperamide (IMODIUM) capsule 4 mg  4 mg Oral QID PRN  
 albuterol (PROVENTIL VENTOLIN) nebulizer solution 2.5 mg  2.5 mg Nebulization Q6H RT  
 sodium chloride 3% hypertonic nebulizer soln  4 mL Nebulization BID RT  
 pantothenic ac-min oil-pet,hyd (AQUAPHOR) 41 % ointment   Topical DAILY  busPIRone (BUSPAR) tablet 10 mg  10 mg Oral TID  insulin glargine (LANTUS) injection 18 Units  18 Units SubCUTAneous QHS  insulin glargine (LANTUS) injection 28 Units  28 Units SubCUTAneous DAILY  mirtazapine (REMERON) tablet 15 mg  15 mg Oral QHS  traMADol (ULTRAM) tablet 50 mg  50 mg Oral Q6H PRN  
 sodium chloride (NS) flush 5-40 mL  5-40 mL IntraVENous Q8H  
 sodium chloride (NS) flush 5-40 mL  5-40 mL IntraVENous PRN  
 acetaminophen (TYLENOL) tablet 650 mg  650 mg Oral Q4H PRN  
 naloxone (NARCAN) injection 0.4 mg  0.4 mg IntraVENous PRN  
 ondansetron (ZOFRAN) injection 4 mg  4 mg IntraVENous Q4H PRN  
 bisacodyL (DULCOLAX) tablet 5 mg  5 mg Oral DAILY PRN  
 insulin regular (NOVOLIN R, HUMULIN R) injection   SubCUTAneous AC&HS  levoFLOXacin (LEVAQUIN) 500 mg in D5W IVPB  500 mg IntraVENous Q48H Objective:  
 
Vitals:  
 02/16/20 0132 02/16/20 8209 02/16/20 5928 02/16/20 9731 BP:  134/52  104/57 Pulse:  89  75 Resp:  20  22 Temp:  97.1 °F (36.2 °C)  97.2 °F (36.2 °C) SpO2: 95% 96% 96% 97% Weight:  220 lb 0.3 oz (99.8 kg) Height:      
 
Blood pressure 104/57, pulse 75, temperature 97.2 °F (36.2 °C), resp. rate 22, height 5' 10\" (1.778 m), weight 220 lb 0.3 oz (99.8 kg), SpO2 97 %. PHYSICAL EXAM  
 
Constitutional:  the patient is well developed and in no acute distress EENMT:  Sclera clear, pupils equal, oral mucosa moist 
Respiratory: Diminished bases, on 3L O2 NC Cardiovascular:  RRR without M,G,R 
Gastrointestinal: soft and non-tender; with positive bowel sounds. Musculoskeletal: warm without cyanosis. There is no lower extremity edema. Noted finger on left hand now pink and only one in index finger or right hand that is black. Skin:  no jaundice or rashes, no wounds Neurologic: no gross neuro deficits Psychiatric:  alert and oriented x 3 Chest CT:  2/14/20 IMPRESSION: 
No evidence of pulmonary embolism. Left lower lobe atelectasis or pneumonia with a left pleural effusion. Linear atelectasis of the right middle and right upper lobes. Dilated diffuse fluid-filled esophagus. No evidence of a hiatal hernia or 
obstructing mass. I question of the patient could have gastroesophageal reflux 
disease. Coronary artery disease status post CABG Recent Labs  
  02/16/20 
0435 02/15/20 
0349 02/14/20 
0504 02/13/20 
1544 WBC 9.5 9.1 7.3 10.1 HGB 9.8* 9.6* 8.7* 9.8* HCT 31.3* 31.5* 28.6* 31.8*  
* 146* 167 142* INR 2.1 2.2 2.4 2.4 Recent Labs  
  02/16/20 
0435 02/15/20 
0349 02/14/20 
0504 02/13/20 
1544  137 138 137  
K 4.0 3.9 4.1 4.1  100 102 100 * 150* 163* 155* CO2 27 29 28 29 BUN 26* 30* 38* 30* CREA 3.59* 3.67* 4.22* 3.78* CA 7.6* 7.8* 7.7* 8.1*  
TROIQ  --   --   --  <0.02* ALB  --   --  1.8* 2.1* TBILI  --   --  0.4 0.5 ALT  --   --  33 45 SGOT  --   --  32 43* No results for input(s): PH, PCO2, PO2, HCO3, PHI, PCO2I, PO2I, HCO3I in the last 72 hours. Recent Labs  
  02/13/20 
1544 LAC 2.0 Assessment:  (Medical Decision Making) Hospital Problems  Date Reviewed: 2/16/2020 Codes Class Noted POA Fluid overload ICD-10-CM: E87.70 ICD-9-CM: 276.69  2/15/2020 Unknown HCAP (healthcare-associated pneumonia) ICD-10-CM: J18.9 ICD-9-CM: 350  2/14/2020 Unknown Acute respiratory failure ([facility] ICD-10-CM: J96.00 
ICD-9-CM: 518.81  2/13/2020 Unknown Atrial fibrillation ([facility] ICD-10-CM: I48.91 
ICD-9-CM: 427.31  1/13/2020 Yes CAD (coronary artery disease) ICD-10-CM: I25.10 ICD-9-CM: 414.00  1/10/2020 Yes * (Principal) Acute hypoxemic respiratory failure ([facility] [address] ICD-10-CM: J96.01 
ICD-9-CM: 518.81  1/10/2020 Yes S/P CABG x 3 ICD-10-CM: Z95.1 ICD-9-CM: V45.81  1/10/2020 Yes S/P AVR ICD-10-CM: Z95.2 ICD-9-CM: V43.3  1/10/2020 Yes DM type 2 (diabetes mellitus, type 2) (HCC) ICD-10-CM: E11.9 ICD-9-CM: 250.00  1/14/2013 Yes HTN (hypertension) ICD-10-CM: I10 
ICD-9-CM: 401.9  1/14/2013 Yes HLD (hyperlipidemia) ICD-10-CM: E78.5 ICD-9-CM: 272.4  1/14/2013 Yes 69 y old just discharged ,was treated for PNA with levaquin ,not finished yet. Chest CT unchanged with left base infiltrates with small effusion. Plan:  (Medical Decision Making) --On levaquin day 6/8 
--continue nebs with flutter valve. IF not expectorating by Monday consider need for bronch. --continue 3% saline nebulizers 
--Continue IS 
--L effusion is too small for tap 
--PT/OT More than 50% of the time documented was spent in face-to-face contact with the patient and in the care of the patient on the floor/unit where the patient is located. Raji Shell NP Lungs: decreased breath sounds on the left Heart:  RRR with no Murmur/Rubs/Gallops Additional Comments:  Bronch tomorrow, cxr in am 
 
I have spoken with and examined the patient. I agree with the above assessment and plan as documented.  
 
Elia Pino MD

## 2020-02-16 NOTE — ROUTINE PROCESS
Shift change report received from [place], RN (off going nurse). Plan of care reviewed with patient at bedside. Opportunity to ask questions provided. Denies needs at this time. Bed low and locked with call light within reach. Patient instructed to call for assistance. Patient verbalized understanding.

## 2020-02-16 NOTE — PROGRESS NOTES
Angelo Woodruff Admission Date: 2/13/2020 Renal Daily Progress Note: 2/16/2020 Follow up ESRD Mr. Lisbeth Ritchie is a 69 y.o male readmitted under observation with c/o SOB. Seen on dialysis Subjective:  
Patient seen and examined c/o dizzy with sitting up and diarrhea; RN reports loose stool but does not know the frequency ROS: He denies CP, N/V/D, fever/ chills, appetite ok. Current Facility-Administered Medications Medication Dose Route Frequency  warfarin (COUMADIN) tablet 4 mg  4 mg Oral QPM  
 albuterol (PROVENTIL VENTOLIN) nebulizer solution 2.5 mg  2.5 mg Nebulization Q6H RT  
 sodium chloride 3% hypertonic nebulizer soln  4 mL Nebulization BID RT  
 pantothenic ac-min oil-pet,hyd (AQUAPHOR) 41 % ointment   Topical DAILY  busPIRone (BUSPAR) tablet 10 mg  10 mg Oral TID  insulin glargine (LANTUS) injection 18 Units  18 Units SubCUTAneous QHS  insulin glargine (LANTUS) injection 28 Units  28 Units SubCUTAneous DAILY  mirtazapine (REMERON) tablet 15 mg  15 mg Oral QHS  traMADol (ULTRAM) tablet 50 mg  50 mg Oral Q6H PRN  
 sodium chloride (NS) flush 5-40 mL  5-40 mL IntraVENous Q8H  
 sodium chloride (NS) flush 5-40 mL  5-40 mL IntraVENous PRN  
 acetaminophen (TYLENOL) tablet 650 mg  650 mg Oral Q4H PRN  
 naloxone (NARCAN) injection 0.4 mg  0.4 mg IntraVENous PRN  
 ondansetron (ZOFRAN) injection 4 mg  4 mg IntraVENous Q4H PRN  
 bisacodyL (DULCOLAX) tablet 5 mg  5 mg Oral DAILY PRN  
 insulin regular (NOVOLIN R, HUMULIN R) injection   SubCUTAneous AC&HS  levoFLOXacin (LEVAQUIN) 500 mg in D5W IVPB  500 mg IntraVENous Q48H Objective:  
 
Vitals:  
 02/16/20 3845 02/16/20 0132 02/16/20 0515 02/16/20 4574 BP: 110/58  134/52 Pulse: 91  89 Resp: 20  20 Temp: 98 °F (36.7 °C)  97.1 °F (36.2 °C) SpO2: 96% 95% 96% 96% Weight:   99.8 kg (220 lb 0.3 oz) Height:      
 
Intake and Output:  
02/14 1901 - 02/16 0700 In: 600 [P.O.:600] Out: 120 [Urine:120] No intake/output data recorded. Physical Exam:  
Constitutional:  the patient is well developed and in no acute distress HEENT:  Sclera clear, pupils equal, oral mucosa moist 
Lungs: diminished lung sounds bilaterally, no wheezes Cardiovascular:  Regular rate, S1, S2, No Rub Abd/GI: soft and non-tender; with positive bowel sounds. Ext: warm without cyanosis. There is trace lower leg edema. Skin:  no jaundice or rashes Neuro: no gross neuro deficits Psychiatric: Calm. Access: Left TCC- intact LAB Recent Labs  
  02/16/20 
0435 02/15/20 
0349 02/14/20 
0504 02/13/20 
1544 WBC 9.5 9.1 7.3 10.1 HGB 9.8* 9.6* 8.7* 9.8* HCT 31.3* 31.5* 28.6* 31.8*  
* 146* 167 142* INR 2.1 2.2 2.4 2.4 Recent Labs  
  02/16/20 
0435 02/15/20 
0349 02/14/20 
0504 02/13/20 
1544  137 138 137  
K 4.0 3.9 4.1 4.1  100 102 100 CO2 27 29 28 29 * 150* 163* 155* BUN 26* 30* 38* 30* CREA 3.59* 3.67* 4.22* 3.78* ALB  --   --  1.8* 2.1*  
SGOT  --   --  32 43* No results for input(s): PH, PCO2, PO2, HCO3 in the last 72 hours. Assessment:  (Medical Decision Making) Hospital Problems  Date Reviewed: 2/9/2020 Codes Class Noted POA Fluid overload ICD-10-CM: E87.70 ICD-9-CM: 276.69  2/15/2020 Unknown HCAP (healthcare-associated pneumonia) ICD-10-CM: J18.9 ICD-9-CM: 657  2/14/2020 Unknown Acute respiratory failure ([facility] ICD-10-CM: J96.00 
ICD-9-CM: 518.81  2/13/2020 Unknown Atrial fibrillation ([facility] ICD-10-CM: I48.91 
ICD-9-CM: 427.31  1/13/2020 Yes CAD (coronary artery disease) ICD-10-CM: I25.10 ICD-9-CM: 414.00  1/10/2020 Yes * (Principal) Acute hypoxemic respiratory failure ([facility] [address] ICD-10-CM: J96.01 
ICD-9-CM: 518.81  1/10/2020 Yes S/P CABG x 3 ICD-10-CM: Z95.1 ICD-9-CM: V45.81  1/10/2020 Yes S/P AVR ICD-10-CM: Z95.2 ICD-9-CM: V43.3  1/10/2020 Yes DM type 2 (diabetes mellitus, type 2) (HCC) ICD-10-CM: E11.9 ICD-9-CM: 250.00  1/14/2013 Yes HTN (hypertension) ICD-10-CM: I10 
ICD-9-CM: 401.9  1/14/2013 Yes HLD (hyperlipidemia) ICD-10-CM: E78.5 ICD-9-CM: 272.4  1/14/2013 Yes Plan:  (Medical Decision Making) 1. DEJUAN/CKD recently d/c DEUJAN with outpatient HD TTS 2. Dyspnea- 2/2 decondition 3. Hx HIT + 4. S/P CABG 5. Anemia 6. Disposition out pt Rehab once stable from hypoxia; Next HD TTS Heracilo Cortez MD

## 2020-02-16 NOTE — ROUTINE PROCESS
Bedside and Verbal shift change report given to Venessa Harrell RN (oncoming nurse) by self (offgoing nurse). Report included the following information SBAR, Kardex, MAR and Recent Results.

## 2020-02-17 PROBLEM — J98.11 ATELECTASIS: Status: ACTIVE | Noted: 2020-01-01

## 2020-02-17 PROBLEM — N17.9 ACUTE-ON-CHRONIC KIDNEY INJURY (HCC): Status: ACTIVE | Noted: 2020-01-01

## 2020-02-17 PROBLEM — N18.9 ACUTE-ON-CHRONIC KIDNEY INJURY (HCC): Status: ACTIVE | Noted: 2020-01-01

## 2020-02-17 NOTE — PROGRESS NOTES
Immanuel Dial Admission Date: 2/13/2020 Daily Progress Note: 2/17/2020 The patient's chart is reviewed and the patient is discussed with the staff. 
 
69 y.o.  male seen and evaluated at the request of Dr. Tisha Palafox for the evaluation of respiratory failure. He was just discharged on 2/12 to [facility] and came right back the next day. .Was DC 2/12/20 from CABG and aortic valve replacement complicated by CKD requiring HD, HIT +, DVTs, wound left thigh on wound vac, pneumonia DC on levaquin Q 48 hours last dose 2/11/20. Mortimer Fabry was DC on 2 L O2 however has been increased to 4L O2 via NC at STR yesterday when he arrived. he says he reports that during HD he felt dizzy  And had acute onset of SOB. He reports today he get SOB with exertion. HE is now on 4L NC with reported Spo2 97 %. He had chest CT with contrast- no PE, still LLL  infiltate with small effusion Subjective:  
 
Patient remains on 2-3L O2 at least at rest. No SOB at rest. Minimal cough. CXR today with ongoing LLL atelectasis. Has been made NPO for bronch with airway inspection and cleanout today. Current Facility-Administered Medications Medication Dose Route Frequency  [START ON 2/18/2020] levoFLOXacin (LEVAQUIN) tablet 500 mg  500 mg Oral Q48H  warfarin (COUMADIN) tablet 5 mg  5 mg Oral QPM  
 loperamide (IMODIUM) capsule 4 mg  4 mg Oral QID PRN  
 albuterol (PROVENTIL VENTOLIN) nebulizer solution 2.5 mg  2.5 mg Nebulization Q6H RT  
 sodium chloride 3% hypertonic nebulizer soln  4 mL Nebulization BID RT  
 pantothenic ac-min oil-pet,hyd (AQUAPHOR) 41 % ointment   Topical DAILY  busPIRone (BUSPAR) tablet 10 mg  10 mg Oral TID  insulin glargine (LANTUS) injection 18 Units  18 Units SubCUTAneous QHS  insulin glargine (LANTUS) injection 28 Units  28 Units SubCUTAneous DAILY  mirtazapine (REMERON) tablet 15 mg  15 mg Oral QHS  traMADol (ULTRAM) tablet 50 mg  50 mg Oral Q6H PRN  
 sodium chloride (NS) flush 5-40 mL  5-40 mL IntraVENous Q8H  
 sodium chloride (NS) flush 5-40 mL  5-40 mL IntraVENous PRN  
 acetaminophen (TYLENOL) tablet 650 mg  650 mg Oral Q4H PRN  
 naloxone (NARCAN) injection 0.4 mg  0.4 mg IntraVENous PRN  
 ondansetron (ZOFRAN) injection 4 mg  4 mg IntraVENous Q4H PRN  
 bisacodyL (DULCOLAX) tablet 5 mg  5 mg Oral DAILY PRN  
 insulin regular (NOVOLIN R, HUMULIN R) injection   SubCUTAneous AC&HS Review of Systems Constitutional: negative for fever, chills, sweats Cardiovascular: negative for chest pain, palpitations, syncope, edema Gastrointestinal: negative for dysphagia, reflux, vomiting, diarrhea, abdominal pain, or melena Neurologic: negative for focal weakness, numbness, headache Objective:  
 
Vitals:  
 02/17/20 0305 02/17/20 0508 02/17/20 0758 02/17/20 0900 BP:  111/61  114/57 Pulse:  94  82 Resp:  22  20 Temp:  97.9 °F (36.6 °C)  97.6 °F (36.4 °C) SpO2: 97% 95% 96% 98% Weight:  251 lb 3.2 oz (113.9 kg) Height:      
 
Intake and Output:  
02/15 1901 - 02/17 0700 In: -  
Out: 1100 [Urine:1100] No intake/output data recorded. Physical Exam:  
Constitution:  the patient is well developed and in no acute distress EENMT:  Sclera clear, pupils equal, oral mucosa moist 
Respiratory: CTA B in anterior lung fields. Cardiovascular:  RRR without M,G,R 
Gastrointestinal: soft and non-tender; with positive bowel sounds. Musculoskeletal: warm without cyanosis. There is L lower leg edema. Skin:  no jaundice or rashes, no wounds Neurologic: no gross neuro deficits Psychiatric:  alert and oriented x ppt CHEST XRAY:  
2/17/20 IMPRESSION: Unchanged left lung base atelectasis or infiltrate and small pleural 
Effusion. LAB No lab exists for component: [place] Recent Labs  
  02/17/20 
0502 02/16/20 
0435 02/15/20 
0841 WBC 10.3 9.5 9.1 HGB 9.5* 9.8* 9.6*  
 HCT 30.2* 31.3* 31.5*  131* 146* INR 2.1 2.1 2.2 Recent Labs  
  02/17/20 
0502 02/16/20 
0435 02/15/20 
2011 * 137 137  
K 4.2 4.0 3.9  102 100 CO2 26 27 29 * 176* 150* BUN 44* 26* 30* CREA 4.34* 3.59* 3.67* CA 7.8* 7.6* 7.8* ABG:  No results found for: PH, PHI, PCO2, PCO2I, PO2, PO2I, HCO3, HCO3I, FIO2, FIO2I Assessment:  (Medical Decision Making) Hospital Problems  Date Reviewed: 2/17/2020 Codes Class Noted POA Acute-on-chronic kidney injury ([facility] ICD-10-CM: N17.9, N18.9 ICD-9-CM: 584.9, 585.9  2/17/2020 Yes Atelectasis ICD-10-CM: J98.11 ICD-9-CM: 518.0  2/17/2020 Unknown Fluid overload ICD-10-CM: E87.70 ICD-9-CM: 276.69  2/15/2020 Yes HCAP (healthcare-associated pneumonia) ICD-10-CM: J18.9 ICD-9-CM: 703  2/14/2020 Yes Acute respiratory failure ([facility] ICD-10-CM: J96.00 
ICD-9-CM: 518.81  2/13/2020 Yes Atrial fibrillation ([facility] ICD-10-CM: I48.91 
ICD-9-CM: 427.31  1/13/2020 Yes CAD (coronary artery disease) ICD-10-CM: I25.10 ICD-9-CM: 414.00  1/10/2020 Yes * (Principal) Acute hypoxemic respiratory failure ([facility] ICD-10-CM: J96.01 
ICD-9-CM: 518.81  1/10/2020 Yes S/P CABG x 3 ICD-10-CM: Z95.1 ICD-9-CM: V45.81  1/10/2020 Yes S/P AVR ICD-10-CM: Z95.2 ICD-9-CM: V43.3  1/10/2020 Yes DM type 2 (diabetes mellitus, type 2) (HCC) ICD-10-CM: E11.9 ICD-9-CM: 250.00  1/14/2013 Yes HTN (hypertension) ICD-10-CM: I10 
ICD-9-CM: 401.9  1/14/2013 Yes HLD (hyperlipidemia) ICD-10-CM: E78.5 ICD-9-CM: 272.4  1/14/2013 Yes Patient with no signs of PNA at present. WBC was normal on this admission. Pct was not repeated. CXR with stable atelectasis. Plan:  (Medical Decision Making)  
-will proceed with bronch to evaluate LLL airways to see if any treatable causes of his atelectasis. -will plan on obtaining sample for culture. -will repeat pct in the am to guide abx therapy. -cont to wean o2 based on sats. -PT Mell Borjas MD

## 2020-02-17 NOTE — PROGRESS NOTES
PT Note: 
Treatment attempted this AM but pt off the floor for procedure. Will re-attempt pending schedule and pt status. Thanks, Girish Reilly, PT, DPT

## 2020-02-17 NOTE — PROGRESS NOTES
TRANSFER - OUT REPORT: 
 
Verbal report given to OLYA Cerda(name) on Wilda Castano  being transferred to 3rd floor(unit) for routine post - op Report consisted of patients Situation, Background, Assessment and  
Recommendations(SBAR). Information from the following report(s) SBAR, Kardex, Procedure Summary, Intake/Output and MAR was reviewed with the receiving nurse. Lines:  
Peripheral IV 02/15/20 Anterior; Left Forearm (Active) Site Assessment Clean, dry, & intact 2/17/2020  8:52 AM  
Phlebitis Assessment 0 2/17/2020  8:52 AM  
Infiltration Assessment 0 2/17/2020  8:52 AM  
Dressing Status Clean, dry, & intact 2/17/2020  8:52 AM  
Dressing Type Transparent 2/17/2020  8:52 AM  
Hub Color/Line Status Patent 2/17/2020  8:52 AM  
Alcohol Cap Used No 2/17/2020  4:35 AM  
  
 
Opportunity for questions and clarification was provided. Patient transported with: 
 O2 @ 3 liters

## 2020-02-17 NOTE — WOUND CARE
Follow up for wound care. Coccyx/ gluteal fold wound improved, minor areas of slough with pink mix, will change orders to dakin's moist packing abd, change daily. Left thigh still with some drainage when area is palpated, but otherwise healing well, will continue current treatment. Bilateral feet/ toe wounds continuing to demarcate, more defined lines between early necrosis and viable tissue, will continue present treatments as ordered. Will monitor.

## 2020-02-17 NOTE — PROGRESS NOTES
Hospitalist Progress Note Admit Date:  2020  3:03 PM  
Name:  Esha Almeida Age:  69 y.o. 
:  1945 MRN:  [id number] PCP:  Reina John MD 
Treatment Team: Attending Provider: Kameron Vargas MD; Consulting Provider: Alec Jackson MD; Consulting Provider: Emily Vela MD; Utilization Review: Alcon Price RN; Care Manager: Roderic Gottron; Primary Nurse: Keaton Rocha RN; Physical Therapist: Tico Soto, PT, DPT 
 
HPI:  
CC: Acute onset of shortness of breath Subjective:  
 
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 Patient presented to the ED with sudden shortness of breath being discharged on  status post CABG with aortic valve replacement complicated by CKD, now requiring HD. Patient was HIT positive with DVTs. Patient was discharged home with 2 L of oxygen but he had to increase that to 4 L prior to admission. Patient went to dialysis on  and he reported dizziness when sitting up and acute onset of shortness of breath. Chest x-ray showed interstitial edema, unchanged left basilar opacity and left pleural effusion. In the ED patient was given Lasix IV. Chest CT was unchanged from previous negative for PE. We will continue Levaquin to end of treatment on . Patient has a sacral wound present on admission and wound care was consulted. Pulmonary also consulted. Per pulmonary patient may need to go for bronc on Monday if he is not expectorating any mucus. Overall patient does not feel like he has made much progress and feels quite weak. Objective:  
 
Patient Vitals for the past 24 hrs: 
 Temp Pulse Resp BP SpO2  
20 0900 97.6 °F (36.4 °C) 82 20 114/57 98 % 02/17/20 0758     96 % 02/17/20 0508 97.9 °F (36.6 °C) 94 22 111/61 95 % 02/17/20 0305     97 % 02/17/20 0056 97.4 °F (36.3 °C) 91 20 121/67 98 % 02/16/20 2304   20  98 % 02/16/20 2223     98 % 02/16/20 2054 98.2 °F (36.8 °C) 89 20 100/54 96 % 02/16/20 1701 98.1 °F (36.7 °C) 89 22 122/66 92 % 02/16/20 1409     92 % 02/16/20 1321 97.7 °F (36.5 °C) 72 24 142/70 91 % Oxygen Therapy O2 Sat (%): 98 % (02/17/20 0900) Pulse via Oximetry: 89 beats per minute (02/17/20 0758) O2 Device: Nasal cannula (02/17/20 0900) O2 Flow Rate (L/min): 2 l/min (02/17/20 0900) Intake/Output Summary (Last 24 hours) at 2/17/2020 1919 Last data filed at 2/16/2020 1221 Gross per 24 hour Intake  Output 1100 ml Net -1100 ml REVIEW OF SYSTEMS: Comprehensive ROS performed and negative except as stated in HPI. Physical Examination: 
General:    Well nourished. Awake and alert. Head:  Normocephalic, atraumatic Eyes:  Extraocular movements intact, normal sclera CV:   RRR. No  Murmurs, clicks, or gallops Lungs:   Unlabored, no cyanosis. On 3 L today Abdomen:   Soft, nondistended, nontender. Extremities: Warm and dry. No cyanosis or edema. Digits pink except for index finger right hand. Skin:     No rashes or jaundice. Sacral wound Neuro:  No gross focal deficits Psych:  Mood and affect appropriate Data Review: 
I have reviewed all labs, meds, telemetry events, and studies from the last 24 hours. Recent Results (from the past 24 hour(s)) GLUCOSE, POC Collection Time: 02/16/20 10:54 AM  
Result Value Ref Range Glucose (POC) 222 (H) 65 - 100 mg/dL GLUCOSE, POC Collection Time: 02/16/20  3:44 PM  
Result Value Ref Range Glucose (POC) 247 (H) 65 - 100 mg/dL GLUCOSE, POC Collection Time: 02/16/20  9:03 PM  
Result Value Ref Range Glucose (POC) 231 (H) 65 - 100 mg/dL PROTHROMBIN TIME + INR  Collection Time: 02/17/20  5:02 AM  
 Result Value Ref Range Prothrombin time 24.4 (H) 12.0 - 14.7 sec INR 2.1    
CBC W/O DIFF Collection Time: 02/17/20  5:02 AM  
Result Value Ref Range WBC 10.3 4.3 - 11.1 K/uL  
 RBC 3.27 (L) 4.23 - 5.6 M/uL HGB 9.5 (L) 13.6 - 17.2 g/dL HCT 30.2 (L) 41.1 - 50.3 % MCV 92.4 79.6 - 97.8 FL  
 MCH 29.1 26.1 - 32.9 PG  
 MCHC 31.5 31.4 - 35.0 g/dL  
 RDW 14.0 11.9 - 14.6 % PLATELET 176 782 - 674 K/uL MPV 10.2 9.4 - 12.3 FL ABSOLUTE NRBC 0.00 0.0 - 0.2 K/uL METABOLIC PANEL, BASIC Collection Time: 02/17/20  5:02 AM  
Result Value Ref Range Sodium 135 (L) 136 - 145 mmol/L Potassium 4.2 3.5 - 5.1 mmol/L Chloride 100 98 - 107 mmol/L  
 CO2 26 21 - 32 mmol/L Anion gap 9 7 - 16 mmol/L Glucose 159 (H) 65 - 100 mg/dL BUN 44 (H) 8 - 23 MG/DL Creatinine 4.34 (H) 0.8 - 1.5 MG/DL  
 GFR est AA 17 (L) >60 ml/min/1.73m2 GFR est non-AA 14 (L) >60 ml/min/1.73m2 Calcium 7.8 (L) 8.3 - 10.4 MG/DL  
GLUCOSE, POC Collection Time: 02/17/20  6:14 AM  
Result Value Ref Range Glucose (POC) 158 (H) 65 - 100 mg/dL All Micro Results None Current Meds: 
Current Facility-Administered Medications Medication Dose Route Frequency  [START ON 2/18/2020] levoFLOXacin (LEVAQUIN) tablet 500 mg  500 mg Oral Q48H  warfarin (COUMADIN) tablet 5 mg  5 mg Oral QPM  
 loperamide (IMODIUM) capsule 4 mg  4 mg Oral QID PRN  
 albuterol (PROVENTIL VENTOLIN) nebulizer solution 2.5 mg  2.5 mg Nebulization Q6H RT  
 sodium chloride 3% hypertonic nebulizer soln  4 mL Nebulization BID RT  
 pantothenic ac-min oil-pet,hyd (AQUAPHOR) 41 % ointment   Topical DAILY  busPIRone (BUSPAR) tablet 10 mg  10 mg Oral TID  insulin glargine (LANTUS) injection 18 Units  18 Units SubCUTAneous QHS  insulin glargine (LANTUS) injection 28 Units  28 Units SubCUTAneous DAILY  mirtazapine (REMERON) tablet 15 mg  15 mg Oral QHS  traMADol (ULTRAM) tablet 50 mg  50 mg Oral Q6H PRN  
  sodium chloride (NS) flush 5-40 mL  5-40 mL IntraVENous Q8H  
 sodium chloride (NS) flush 5-40 mL  5-40 mL IntraVENous PRN  
 acetaminophen (TYLENOL) tablet 650 mg  650 mg Oral Q4H PRN  
 naloxone (NARCAN) injection 0.4 mg  0.4 mg IntraVENous PRN  
 ondansetron (ZOFRAN) injection 4 mg  4 mg IntraVENous Q4H PRN  
 bisacodyL (DULCOLAX) tablet 5 mg  5 mg Oral DAILY PRN  
 insulin regular (NOVOLIN R, HUMULIN R) injection   SubCUTAneous AC&HS Diet: DIET NUTRITIONAL SUPPLEMENTS 
DIET NPO Body mass index is 36.04 kg/m². Other Studies (last 24 hours): Xr Chest Rl Michelle Result Date: 2/17/2020 EXAM: Chest x-ray. INDICATION: Dyspnea. COMPARISON: February 13, 2020. TECHNIQUE: Frontal view chest x-ray. FINDINGS: There is unchanged left lung base opacity, probably a combination of atelectasis or infiltrate and a small pleural effusion. The right lung is clear except for minimal basilar atelectasis. Again noted is cardiomegaly, a prior sternotomy, a right chest wall pacemaker and a prosthetic heart valve. No pneumothorax is identified. The left-sided dialysis catheter remains in place. IMPRESSION: Unchanged left lung base atelectasis or infiltrate and small pleural effusion. Assessment and Plan:  
 
Hospital Problems as of 2/17/2020 Date Reviewed: 2/17/2020 Codes Class Noted - Resolved POA Acute-on-chronic kidney injury ([facility] ICD-10-CM: N17.9, N18.9 ICD-9-CM: 584.9, 585.9  2/17/2020 - Present Yes Fluid overload ICD-10-CM: E87.70 ICD-9-CM: 276.69  2/15/2020 - Present Yes HCAP (healthcare-associated pneumonia) ICD-10-CM: J18.9 ICD-9-CM: 362  2/14/2020 - Present Yes Acute respiratory failure ([facility] ICD-10-CM: J96.00 
ICD-9-CM: 518.81  2/13/2020 - Present Yes Atrial fibrillation ([facility] ICD-10-CM: I48.91 
ICD-9-CM: 427.31  1/13/2020 - Present Yes CAD (coronary artery disease) ICD-10-CM: I25.10 ICD-9-CM: 414.00  1/10/2020 - Present Yes * (Principal) Acute hypoxemic respiratory failure ([facility] ICD-10-CM: J96.01 
ICD-9-CM: 518.81  1/10/2020 - Present Yes S/P CABG x 3 ICD-10-CM: Z95.1 ICD-9-CM: V45.81  1/10/2020 - Present Yes S/P AVR ICD-10-CM: Z95.2 ICD-9-CM: V43.3  1/10/2020 - Present Yes DM type 2 (diabetes mellitus, type 2) (HCC) ICD-10-CM: E11.9 ICD-9-CM: 250.00  1/14/2013 - Present Yes HTN (hypertension) ICD-10-CM: I10 
ICD-9-CM: 401.9  1/14/2013 - Present Yes HLD (hyperlipidemia) ICD-10-CM: E78.5 ICD-9-CM: 272.4  1/14/2013 - Present Yes A/P:   
Acute on chronic hypoxic resp failure/pneumonia previous admission still on outpatient abx CT chest r/o PE, appears unchanged from last admission, DC 2/12 Pulmonary following Wean O2 as tolerated Continue levaquin every 48 hours total 8 days EOT 2/20/20 Flutter valve, if not expectorating by Monday consider need for Bronch Left effusion to small to tap 
  
DVT INR 2.1, pharmacy to dose HIT + last admission Continue Coumadin 
  
CAD s/p CABG s/p AVR Stable Continue current regimen 
  
HTN Stable 
  
DM II Continue home regimen Add SSI 
  
ESRD on HD Nephrology following Had HD yesterday 
  
 
DC planning/Dispo: Dissipate return to short-term rehab facility DVT ppx: Coumadin Code status: 
Medical decision maker: 
 
Case reviewed with supervising physician - HAKAN Pham MD 
 
Signed: 
Jaylin Ritter NP-C

## 2020-02-17 NOTE — PROGRESS NOTES
TRANSFER - IN REPORT: 
 
Verbal report received from [facility] RN(name) on Angelo Woodruff  being received from 821(unit) for ordered procedure Report consisted of patients Situation, Background, Assessment and  
Recommendations(SBAR). Information from the following report(s) Kardex was reviewed with the receiving nurse. Opportunity for questions and clarification was provided. Assessment completed upon patients arrival to unit and care assumed.

## 2020-02-17 NOTE — ROUTINE PROCESS
Bedside and Verbal shift change report given to Kelly Pugh RN (oncoming nurse) by self Caio benitez). Report included the following information SBAR, Kardex, Intake/Output, MAR, Recent Results

## 2020-02-17 NOTE — INTERVAL H&P NOTE
H&P Update: 
Elayne Rod was seen and examined. History and physical has been reviewed. The patient has been examined.  There have been no significant clinical changes since the completion of the originally dated History and Physical.

## 2020-02-17 NOTE — ROUTINE PROCESS
Bedside and Verbal shift change report given to self (oncoming nurse) by oRberto Maradiaga (offgoing nurse). Report included the following information SBAR, Kardex, Intake/Output and MAR.

## 2020-02-17 NOTE — PROCEDURES
PROCEDURE Bronchoscopy with airway inspection /cleanout. INDICATION  
atelectasis EQUIPMENT: 
Olympus T 180 Bronchoscope ANESTHESIA Concious sedation with: Fentanyl 150 mcg IV; Versed 3mg IV; Lidocaine 200 mg to tracheo-bronchial tree and vocal cords IMAGING: 
CXR 2/17/20 AIRWAY INSPECTION After obtaining informed consent, orally with use of a bite block, an Olympus T 180 Bronchoscope was introduced into the trachea without complication. RIGHT 
 
LOCATION NORM/ABNORM DESCRIPTION Larynx NL   
VOCAL CORDS NL   
TRACHEA NL   
DENNIS NL   
RMSB NL   
RUL NL   
BI NL   
RML NL   
RLL NL   
SUP SEGM RLL NL   
MED BASAL NL   
ANTERIOR BASAL NL   
LATERAL BASAL NL   
POSTERIOR BASAL NL   
 
   
     LEFT 
 
LOCATION NORM/ABNORM DESCRIPTION  
LMSB NL   
RADHA NL   
LINGULA NL   
LLL NL   
SUPERIOR SEG LLL NL   
NIRAV-MEDIAL LLL NL   
LATERAL LLL NL   
POSTERIOR LLL NL All airways were somewhat collapsible but there was nothing obstructing the LLL airways and no mucus plugging. The following samples were obtained: 
 
Bronchial Wash: LLL Samples were sent for: 
Cell count, diff, routine culture. The procedure was completed without complication and the patient tolerated the procedure well. EBL: None Recommendations: 
Return to inpatient floors.   
 
Leonard Chatman MD

## 2020-02-17 NOTE — PROGRESS NOTES
Warfarin dosing per pharmacist 
 
Ab Correa is a 69 y.o. male. Height: 5' 10\" (177.8 cm)    Weight: 113.9 kg (251 lb 3.2 oz) Indication:  AVR and afib Goal INR:  2.5 - 3.5 Home dose:  2.5 mg daily Risk factors or significant drug interactions:  Levofloxacin (2/9), amiodarone dc'd (2/5) Other anticoagulants:  N/A Daily Monitoring Date  INR     Warfarin dose HGB              Notes 2/14  2.4  3 mg  8.7 2/15  2.2  4 mg  9.6 2/16  2.1  5 mg  9.8 2/17  2.1  5 mg  9.5 INR remains subtherapeutic @2.1. Dose was increased to 5mg last night. Will continue with 5mg again tonight and continue to monitor. Thank you, 
 
Mil SadlerD, BCPS Pharmacist 
[phone]

## 2020-02-17 NOTE — ROUTINE PROCESS
Shift change report received from [facility] (off going nurse). Plan of care reviewed with patient at bedside. Opportunity to ask questions provided. Denies needs at this time. Bed low and locked with call light within reach. Patient instructed to call for assistance. Patient verbalized understanding.

## 2020-02-17 NOTE — PROGRESS NOTES
Avani Trujillo Admission Date: 2/13/2020 Renal Daily Progress Note: 2/17/2020 Follow up ESRD Mr. Yoana Moore is a 69 y.o male readmitted under observation with c/o SOB. Seen on dialysis Subjective: No complaints ROS: He denies CP, N/V/D, fever/ chills, appetite ok. Current Facility-Administered Medications Medication Dose Route Frequency  [START ON 2/18/2020] levoFLOXacin (LEVAQUIN) tablet 500 mg  500 mg Oral Q48H  
 sodium chloride (NS) flush 5-40 mL  5-40 mL IntraVENous Q8H  
 sodium chloride (NS) flush 5-40 mL  5-40 mL IntraVENous PRN  
 0.9% sodium chloride infusion  25 mL/hr IntraVENous CONTINUOUS  
 warfarin (COUMADIN) tablet 5 mg  5 mg Oral QPM  
 loperamide (IMODIUM) capsule 4 mg  4 mg Oral QID PRN  
 albuterol (PROVENTIL VENTOLIN) nebulizer solution 2.5 mg  2.5 mg Nebulization Q6H RT  
 sodium chloride 3% hypertonic nebulizer soln  4 mL Nebulization BID RT  
 pantothenic ac-min oil-pet,hyd (AQUAPHOR) 41 % ointment   Topical DAILY  busPIRone (BUSPAR) tablet 10 mg  10 mg Oral TID  insulin glargine (LANTUS) injection 18 Units  18 Units SubCUTAneous QHS  insulin glargine (LANTUS) injection 28 Units  28 Units SubCUTAneous DAILY  mirtazapine (REMERON) tablet 15 mg  15 mg Oral QHS  traMADol (ULTRAM) tablet 50 mg  50 mg Oral Q6H PRN  
 sodium chloride (NS) flush 5-40 mL  5-40 mL IntraVENous Q8H  
 sodium chloride (NS) flush 5-40 mL  5-40 mL IntraVENous PRN  
 acetaminophen (TYLENOL) tablet 650 mg  650 mg Oral Q4H PRN  
 naloxone (NARCAN) injection 0.4 mg  0.4 mg IntraVENous PRN  
 ondansetron (ZOFRAN) injection 4 mg  4 mg IntraVENous Q4H PRN  
 bisacodyL (DULCOLAX) tablet 5 mg  5 mg Oral DAILY PRN  
 insulin regular (NOVOLIN R, HUMULIN R) injection   SubCUTAneous AC&HS Objective:  
 
Vitals:  
 02/17/20 1249 02/17/20 1251 02/17/20 1259 02/17/20 1332 BP: 133/65 123/60 133/65 123/62 Pulse: 91 89 90 81 Resp:  20 20 22 Temp:    98 °F (36.7 °C) SpO2:  96% 95% 94% Weight:      
Height:      
 
Intake and Output:  
02/15 1901 - 02/17 0700 In: -  
Out: 1100 [Urine:1100] 02/17 0701 - 02/17 1900 In: 500 [I.V.:500] Out: - Physical Exam:  
Constitutional:  the patient is well developed and in no acute distress HEENT:  Sclera clear, pupils equal, oral mucosa moist 
Lungs: diminished lung sounds bilaterally, no wheezes Cardiovascular:  Regular rate, S1, S2, No Rub Abd/GI: soft and non-tender; with positive bowel sounds. Ext: warm without cyanosis. There is trace lower leg edema. Skin:  no jaundice or rashes Neuro: no gross neuro deficits Psychiatric: Calm. Access: Left TCC- intact LAB Recent Labs  
  02/17/20 
0502 02/16/20 
0435 02/15/20 
0685 WBC 10.3 9.5 9.1 HGB 9.5* 9.8* 9.6* HCT 30.2* 31.3* 31.5*  131* 146* INR 2.1 2.1 2.2 Recent Labs  
  02/17/20 
0502 02/16/20 
0435 02/15/20 
7951 * 137 137  
K 4.2 4.0 3.9  102 100 CO2 26 27 29 * 176* 150* BUN 44* 26* 30* CREA 4.34* 3.59* 3.67* No results for input(s): PH, PCO2, PO2, HCO3 in the last 72 hours. Assessment:  (Medical Decision Making) Hospital Problems  Date Reviewed: 2/17/2020 Codes Class Noted POA Acute-on-chronic kidney injury ([facility] ICD-10-CM: N17.9, N18.9 ICD-9-CM: 584.9, 585.9  2/17/2020 Yes Atelectasis ICD-10-CM: J98.11 ICD-9-CM: 518.0  2/17/2020 Unknown Fluid overload ICD-10-CM: E87.70 ICD-9-CM: 276.69  2/15/2020 Yes HCAP (healthcare-associated pneumonia) ICD-10-CM: J18.9 ICD-9-CM: 208  2/14/2020 Yes Acute respiratory failure ([facility] ICD-10-CM: J96.00 
ICD-9-CM: 518.81  2/13/2020 Yes Atrial fibrillation ([facility] ICD-10-CM: I48.91 
ICD-9-CM: 427.31  1/13/2020 Yes CAD (coronary artery disease) ICD-10-CM: I25.10 ICD-9-CM: 414.00  1/10/2020 Yes * (Principal) Acute hypoxemic respiratory failure ([facility] ICD-10-CM: J96.01 
ICD-9-CM: 518.81  1/10/2020 Yes S/P CABG x 3 ICD-10-CM: Z95.1 ICD-9-CM: V45.81  1/10/2020 Yes S/P AVR ICD-10-CM: Z95.2 ICD-9-CM: V43.3  1/10/2020 Yes DM type 2 (diabetes mellitus, type 2) (HCC) ICD-10-CM: E11.9 ICD-9-CM: 250.00  1/14/2013 Yes HTN (hypertension) ICD-10-CM: I10 
ICD-9-CM: 401.9  1/14/2013 Yes HLD (hyperlipidemia) ICD-10-CM: E78.5 ICD-9-CM: 272.4  1/14/2013 Yes Plan:  (Medical Decision Making) 1. DEJUAN/CKD recently d/c DEJUAN with outpatient HD TTS 2. Dyspnea- 2/2 decondition 3. Hx HIT + 4. S/P CABG 5. Anemia 6. Disposition out pt Rehab once stable from hypoxia; Next HD TTS - orders written Crissy Somers MD

## 2020-02-17 NOTE — PROGRESS NOTES
TRANSFER - IN REPORT: 
 
Verbal report received from OLYA Gonzalez on Avani Trujillo being received from GI Lab for routine progression of care. Report consisted of patients Situation, Background, Assessment and Recommendations(SBAR). Information from the following report(s) Procedure Summary and MAR was reviewed. Opportunity for questions and clarification was provided. Assessment completed upon patients arrival to unit and care assumed. Patient received to room 334. Patient connected to monitor and assessment completed. Plan of care reviewed. Patient oriented to room and call light. Patient aware to use call light to communicate any chest pain or needs.

## 2020-02-18 PROBLEM — I65.23 BILATERAL CAROTID ARTERY STENOSIS: Chronic | Status: ACTIVE | Noted: 2019-01-01

## 2020-02-18 PROBLEM — R57.9 SHOCK (HCC): Status: RESOLVED | Noted: 2020-01-01 | Resolved: 2020-01-01

## 2020-02-18 PROBLEM — J93.9 BILATERAL PNEUMOTHORAX: Status: RESOLVED | Noted: 2020-01-01 | Resolved: 2020-01-01

## 2020-02-18 PROBLEM — D69.6 THROMBOCYTOPENIA (HCC): Status: RESOLVED | Noted: 2020-01-01 | Resolved: 2020-01-01

## 2020-02-18 PROBLEM — I35.0 NONRHEUMATIC AORTIC VALVE STENOSIS: Chronic | Status: ACTIVE | Noted: 2018-04-13

## 2020-02-18 PROBLEM — Z99.11 WEANING FROM RESPIRATOR (HCC): Status: RESOLVED | Noted: 2020-01-01 | Resolved: 2020-01-01

## 2020-02-18 PROBLEM — I35.0 AORTIC VALVE STENOSIS: Chronic | Status: ACTIVE | Noted: 2020-01-01

## 2020-02-18 PROBLEM — I95.9 HYPOTENSION: Status: ACTIVE | Noted: 2020-01-01

## 2020-02-18 PROBLEM — Z95.2 S/P AVR: Chronic | Status: ACTIVE | Noted: 2020-01-01

## 2020-02-18 PROBLEM — Z95.1 S/P CABG X 3: Chronic | Status: ACTIVE | Noted: 2020-01-01

## 2020-02-18 PROBLEM — I25.10 CAD (CORONARY ARTERY DISEASE): Chronic | Status: ACTIVE | Noted: 2020-01-01

## 2020-02-18 PROBLEM — D75.829 HIT (HEPARIN-INDUCED THROMBOCYTOPENIA): Chronic | Status: ACTIVE | Noted: 2020-01-01

## 2020-02-18 PROBLEM — E66.01 OBESITY, MORBID (HCC): Chronic | Status: ACTIVE | Noted: 2018-10-19

## 2020-02-18 PROBLEM — E11.21 TYPE 2 DIABETES WITH NEPHROPATHY (HCC): Chronic | Status: ACTIVE | Noted: 2019-01-01

## 2020-02-18 NOTE — PROGRESS NOTES
Care Management Interventions PCP Verified by CM: Yes Mode of Transport at Discharge: BLS(Hien White Spouse [phone] ) Transition of Care Consult (CM Consult): Discharge Planning, SNF Discharge Durable Medical Equipment: No 
Physical Therapy Consult: Yes Occupational Therapy Consult: Yes Speech Therapy Consult: No 
Current Support Network: [address] [place] [address] Confirm Follow Up Transport: Family The Plan for Transition of Care is Related to the Following Treatment Goals : SNF The Patient and/or Patient Representative was Provided with a Choice of Provider and Agrees with the Discharge Plan?: Yes Name of the Patient Representative Who was Provided with a Choice of Provider and Agrees with the Discharge Plan: Pt son Freedom of Choice List was Provided with Basic Dialogue that Supports the Patient's Individualized Plan of Care/Goals, Treatment Preferences and Shares the Quality Data Associated with the Providers?: Yes Discharge Location Discharge Placement: Rehab Unit Subacute [employer] precert pending. CM to continue to follow for dc to SNF.

## 2020-02-18 NOTE — ROUTINE PROCESS
Bedside and Verbal shift change report given to Gila Jordan RN (oncoming nurse) by self Viktoria Overlie nurse). Report included the following information SBAR, Kardex, Intake/Output, MAR, Recent Results

## 2020-02-18 NOTE — ROUTINE PROCESS
Bedside and Verbal shift change report given to  (oncoming nurse) by Dara Jamison (offgoing nurse). Report included the following information SBAR, Kardex, Intake/Output, MAR and Recent Results.

## 2020-02-18 NOTE — PROGRESS NOTES
Renea Menard Admission Date: 2/13/2020 Daily Progress Note: 2/18/2020 The patient's chart is reviewed and the patient is discussed with the staff.  69 y.o.  male seen and evaluated at the request of Dr. Jhonny Gayle for the evaluation of respiratory failure. He was just discharged on 2/12 to [facility] and came right back the next day. Was DC 2/12/20 from CABG and aortic valve replacement complicated by CKD requiring HD, HIT +, DVTs, wound left thigh on wound vac, pneumonia DC on levaquin Q 48 hours last dose 2/11/20. Milli Casillas was DC on 2 L O2 however has been increased to 4L O2 via NC at STR yesterday when he arrived. he says he reports that during HD he felt dizzy  And had acute onset of SOB. He had chest CT with contrast- no PE, still LLL opacity. Bronch 2/17 - no obstruction, small effusion. On Levaquin for ? Pneumonia. Subjective:  
  Frustrated with dyspnea, weakness. Discussed recent hospitalization, what happened in rehab with shortness of breath. Current Facility-Administered Medications Medication Dose Route Frequency  warfarin (COUMADIN) tablet 2 mg  2 mg Oral QPM  
 levoFLOXacin (LEVAQUIN) tablet 500 mg  500 mg Oral Q48H  
 sodium chloride (NS) flush 5-40 mL  5-40 mL IntraVENous Q8H  
 sodium chloride (NS) flush 5-40 mL  5-40 mL IntraVENous PRN  
 0.9% sodium chloride infusion  25 mL/hr IntraVENous CONTINUOUS  
 sodium hypochlorite (QUARTER STRENGTH DAKIN'S) 0.125% irrigation (bottle)   Topical DAILY  loperamide (IMODIUM) capsule 4 mg  4 mg Oral QID PRN  
 albuterol (PROVENTIL VENTOLIN) nebulizer solution 2.5 mg  2.5 mg Nebulization Q6H RT  
 sodium chloride 3% hypertonic nebulizer soln  4 mL Nebulization BID RT  
 pantothenic ac-min oil-pet,hyd (AQUAPHOR) 41 % ointment   Topical DAILY  busPIRone (BUSPAR) tablet 10 mg  10 mg Oral TID  insulin glargine (LANTUS) injection 18 Units  18 Units SubCUTAneous QHS  insulin glargine (LANTUS) injection 28 Units  28 Units SubCUTAneous DAILY  mirtazapine (REMERON) tablet 15 mg  15 mg Oral QHS  traMADol (ULTRAM) tablet 50 mg  50 mg Oral Q6H PRN  
 sodium chloride (NS) flush 5-40 mL  5-40 mL IntraVENous Q8H  
 sodium chloride (NS) flush 5-40 mL  5-40 mL IntraVENous PRN  
 acetaminophen (TYLENOL) tablet 650 mg  650 mg Oral Q4H PRN  
 naloxone (NARCAN) injection 0.4 mg  0.4 mg IntraVENous PRN  
 ondansetron (ZOFRAN) injection 4 mg  4 mg IntraVENous Q4H PRN  
 bisacodyL (DULCOLAX) tablet 5 mg  5 mg Oral DAILY PRN  
 insulin regular (NOVOLIN R, HUMULIN R) injection   SubCUTAneous AC&HS Objective:  
 
Vitals:  
 02/18/20 0933 02/18/20 1006 02/18/20 1036 02/18/20 1056 BP: 111/64 101/65 107/67 103/69 Pulse: 91 92 91 89 Resp:      
Temp:      
SpO2:      
Weight:      
Height:      
 
Intake and Output:  
02/16 1901 - 02/18 0700 In: 740 [P.O.:240; I.V.:500] Out: 625 [Urine:625] 02/18 0701 - 02/18 1900 In: -  
Out: 600 Physical Exam:  
Constitutional:  the patient is well developed and in no acute distress HEENT:  Sclera clear, pupils equal, oral mucosa moist 
Lungs: a few crackles from posterior. Wearing 5 liter cannula - asked me to turn down to 4 liters. Cardiovascular:  RRR without M,G,R 
Abd/GI: soft and non-tender; with positive bowel sounds. Ext: warm without cyanosis. There is no lower leg edema. Musculoskeletal: moves all four extremities with equal strength Skin:  no jaundice or rashes, ischemic digits Neuro: no gross neuro deficits Musculoskeletal: can't ambulate - needs assistance. No deformity Psychiatric: Calm. Review of Systems - Review of Systems Constitutional: Positive for malaise/fatigue. Respiratory: Positive for shortness of breath. Cardiovascular: Negative. Gastrointestinal: Positive for diarrhea. Lines:  Dialysis catheter CHEST XRAY:   
 
 
LAB Recent Labs  
  02/18/20 
0408 02/17/20 
0502 02/16/20 9271 WBC 10.9 10.3 9.5 HGB 9.3* 9.5* 9.8* HCT 30.3* 30.2* 31.3*  
 158 131* INR 2.8 2.1 2.1 Recent Labs  
  02/18/20 
0408 02/17/20 
0502 02/16/20 
0435 * 135* 137  
K 4.3 4.2 4.0  
 100 102 CO2 27 26 27 * 159* 176* BUN 58* 44* 26* CREA 4.57* 4.34* 3.59* Assessment:  (Medical Decision Making) Hospital Problems  Date Reviewed: 2/17/2020 Codes Class Noted POA Hypotension ICD-10-CM: I95.9 ICD-9-CM: 458.9  2/18/2020 Yes Discussed with nephrology. Limits dialysis Acute-on-chronic kidney injury ([facility] ICD-10-CM: N17.9, N18.9 ICD-9-CM: 584.9, 585.9  2/17/2020 Yes Dialysis today. No signs of recovery Atelectasis ICD-10-CM: J98.11 ICD-9-CM: 518.0  2/17/2020 Yes Bronch yesterday. 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 
ICD-9-CM: 427.31  1/13/2020 Yes Sinus per telemetry CAD (coronary artery disease) (Chronic) ICD-10-CM: I25.10 ICD-9-CM: 414.00  1/10/2020 Yes S/p surgery * (Principal) Acute hypoxemic respiratory failure ([facility] ICD-10-CM: J96.01 
ICD-9-CM: 518.81  1/10/2020 Yes Remains on oxygen. + dyspnea. CXR with edema, small effusion, atx S/P CABG x 3 (Chronic) ICD-10-CM: Z95.1 ICD-9-CM: V45.81  1/10/2020 Yes S/P AVR (Chronic) ICD-10-CM: Z95.2 ICD-9-CM: V43.3  1/10/2020 Yes DM type 2 (diabetes mellitus, type 2) (HCC) (Chronic) ICD-10-CM: E11.9 ICD-9-CM: 250.00  1/14/2013 Yes BS mid 100s to 200s  HTN (hypertension) (Chronic) ICD-10-CM: I10 
 ICD-9-CM: 401.9  1/14/2013 Yes Currently hypotensive with dialysis HLD (hyperlipidemia) (Chronic) ICD-10-CM: L13.0 ICD-9-CM: 272.4  1/14/2013 Yes Plan:  (Medical Decision Making) 1. Bronch yesterday - normal yessenia in washings so far. On levaquin for ? Pneumonia - day 4. PCT 1/3 -> 0.7. Will complete 7 day course 2. Discussed hypotension with Dr. Marine Ann - he is considering midodrine so that they can pull more fluid with dialysis. May help with dyspnea 3. Consult PT  
4. Continue with bronchodilators for atelectasis 5. Oxygen support - wean as tolerated 6. Monitor stools - he reports loose stools overnight. Not on any stool softeners or laxatives 7. Back to rehab when stable Raul Cifuentes NP Lungs:  Some basilar crackles Heart:  RRR with no Murmur/Rubs/Gallops Additional Comments:  mitodrine added- hopefully will be able to take more fluid off with dialysis I have spoken with and examined the patient. I agree with the above assessment and plan as documented. Elia Pino MD 
 
 
More than 50% of time documented was spent face-to-face contact with the patient and in the care of the patient on the floor/unit where the patient is located.

## 2020-02-18 NOTE — DIALYSIS
TRANSFER IN - DIALYSIS Received patient in dialysis unit  from [facility](unit) for ordered procedure. Consent verified for renal replacement therapy. Patient caox and vital signs stable. /89 P79 Hemodialysis initiated using LTPC. Aspirated and flushed both ports without difficulty. Dressing clean, dry and intact. Machine settings per MD order. Will monitor during treatment.

## 2020-02-18 NOTE — PROGRESS NOTES
Hossein Thomason Admission Date: 2/13/2020 Renal Daily Progress Note: 2/18/2020 Follow up ESRD Subjective:  
Patient seen and examined on HD, dialyzing via left  Qb, UF 1000, hypotension with UF, asymptomatic. Pt complaints of rectal discomfort. ROS: He denies CP, N/V/D, fever/ chills, appetite ok. Current Facility-Administered Medications Medication Dose Route Frequency  levoFLOXacin (LEVAQUIN) tablet 500 mg  500 mg Oral Q48H  
 sodium chloride (NS) flush 5-40 mL  5-40 mL IntraVENous Q8H  
 sodium chloride (NS) flush 5-40 mL  5-40 mL IntraVENous PRN  
 0.9% sodium chloride infusion  25 mL/hr IntraVENous CONTINUOUS  
 sodium hypochlorite (QUARTER STRENGTH DAKIN'S) 0.125% irrigation (bottle)   Topical DAILY  warfarin (COUMADIN) tablet 5 mg  5 mg Oral QPM  
 loperamide (IMODIUM) capsule 4 mg  4 mg Oral QID PRN  
 albuterol (PROVENTIL VENTOLIN) nebulizer solution 2.5 mg  2.5 mg Nebulization Q6H RT  
 sodium chloride 3% hypertonic nebulizer soln  4 mL Nebulization BID RT  
 pantothenic ac-min oil-pet,hyd (AQUAPHOR) 41 % ointment   Topical DAILY  busPIRone (BUSPAR) tablet 10 mg  10 mg Oral TID  insulin glargine (LANTUS) injection 18 Units  18 Units SubCUTAneous QHS  insulin glargine (LANTUS) injection 28 Units  28 Units SubCUTAneous DAILY  mirtazapine (REMERON) tablet 15 mg  15 mg Oral QHS  traMADol (ULTRAM) tablet 50 mg  50 mg Oral Q6H PRN  
 sodium chloride (NS) flush 5-40 mL  5-40 mL IntraVENous Q8H  
 sodium chloride (NS) flush 5-40 mL  5-40 mL IntraVENous PRN  
 acetaminophen (TYLENOL) tablet 650 mg  650 mg Oral Q4H PRN  
 naloxone (NARCAN) injection 0.4 mg  0.4 mg IntraVENous PRN  
 ondansetron (ZOFRAN) injection 4 mg  4 mg IntraVENous Q4H PRN  
 bisacodyL (DULCOLAX) tablet 5 mg  5 mg Oral DAILY PRN  
 insulin regular (NOVOLIN R, HUMULIN R) injection   SubCUTAneous AC&HS Objective:  
 
Vitals: 02/18/20 0518 02/18/20 2388 02/18/20 0740 02/18/20 7037 BP: 92/54 116/89 111/69 100/55 Pulse: 83 79 87 86 Resp: 18 Temp: 97.9 °F (36.6 °C) SpO2: 95% Weight: 113.9 kg (251 lb) Height:      
 
Intake and Output:  
02/16 1901 - 02/18 0700 In: 740 [P.O.:240; I.V.:500] Out: 625 [Urine:625] No intake/output data recorded. Physical Exam:  
Constitutional:  the patient is well developed and in no acute distress HEENT:  Sclera clear, pupils equal, oral mucosa moist 
Lungs: clear bilaterally, no wheezes Cardiovascular:  Regular rate, S1, S2, No Rub Abd/GI: soft and non-tender; with positive bowel sounds. Ext: warm without cyanosis. There is trace lower leg edema. Skin:  no jaundice or rashes Neuro: no gross neuro deficits Psychiatric: Calm. Access: Left TCC- intact LAB Recent Labs  
  02/18/20 
0408 02/17/20 
0502 02/16/20 
0435 WBC 10.9 10.3 9.5 HGB 9.3* 9.5* 9.8* HCT 30.3* 30.2* 31.3*  
 158 131* INR 2.8 2.1 2.1 Recent Labs  
  02/18/20 
0408 02/17/20 
0502 02/16/20 
0435 * 135* 137  
K 4.3 4.2 4.0  
 100 102 CO2 27 26 27 * 159* 176* BUN 58* 44* 26* CREA 4.57* 4.34* 3.59* No results for input(s): PH, PCO2, PO2, HCO3 in the last 72 hours. Assessment:  (Medical Decision Making) Hospital Problems  Date Reviewed: 2/17/2020 Codes Class Noted POA Acute-on-chronic kidney injury ([facility] ICD-10-CM: N17.9, N18.9 ICD-9-CM: 584.9, 585.9  2/17/2020 Yes Atelectasis ICD-10-CM: J98.11 ICD-9-CM: 518.0  2/17/2020 Unknown Fluid overload ICD-10-CM: E87.70 ICD-9-CM: 276.69  2/15/2020 Yes HCAP (healthcare-associated pneumonia) ICD-10-CM: J18.9 ICD-9-CM: 211  2/14/2020 Yes Acute respiratory failure ([facility] ICD-10-CM: J96.00 
ICD-9-CM: 518.81  2/13/2020 Yes Atrial fibrillation ([facility] ICD-10-CM: I48.91 
ICD-9-CM: 427.31  1/13/2020 Yes CAD (coronary artery disease) ICD-10-CM: I25.10 ICD-9-CM: 414.00  1/10/2020 Yes * (Principal) Acute hypoxemic respiratory failure ([facility] ICD-10-CM: J96.01 
ICD-9-CM: 518.81  1/10/2020 Yes S/P CABG x 3 ICD-10-CM: Z95.1 ICD-9-CM: V45.81  1/10/2020 Yes S/P AVR ICD-10-CM: Z95.2 ICD-9-CM: V43.3  1/10/2020 Yes DM type 2 (diabetes mellitus, type 2) (HCC) ICD-10-CM: E11.9 ICD-9-CM: 250.00  1/14/2013 Yes HTN (hypertension) ICD-10-CM: I10 
ICD-9-CM: 401.9  1/14/2013 Yes HLD (hyperlipidemia) ICD-10-CM: E78.5 ICD-9-CM: 272.4  1/14/2013 Yes Plan:  (Medical Decision Making) 1. DEJUAN/CKD recently d/c DEJUAN with outpatient HD TTS 
-no sign of renal recovery 
-seen on HD, daily weight- establish new EDW 2. Dyspnea- 2/2 decondition- improved 3. Hx HIT + 4. S/P CABG 5. Anemia 6. Disposition out pt Rehab once stable from hypoxia Justin Henley NP

## 2020-02-18 NOTE — PROGRESS NOTES
Warfarin dosing per pharmacist 
 
Emily Fragoso is a 69 y.o. male. Height: 5' 10\" (177.8 cm)    Weight: 113.9 kg (251 lb) Indication:  AVR and afib Goal INR:  2.5 - 3.5 Home dose:  2.5 mg daily Risk factors or significant drug interactions:  Levofloxacin (2/13-  ), amiodarone (dc'd 2/5) Other anticoagulants:  N/A Daily Monitoring Date  INR     Warfarin dose HGB              Notes 2/14  2.4  3 mg  8.7 2/15  2.2  4 mg  9.6 2/16  2.1  5 mg  9.8 2/17  2.1  5 mg  9.5 2/18  2.8  2 mg  9.3 A/P:  INR therapeutic, but has increased sharply overnight. Will give a lower dose of warfarin 2mg tonight. Thank you, Vicki Storey, PharmD, BCPS Clinical Pharmacist 
[phone]

## 2020-02-18 NOTE — PROGRESS NOTES
Progress Note 2020 Admit Date: 2020  3:03 PM  
NAME: Ibeth Courtney :  1945 MRN:  [id number] Attending: Ilana Jernigan MD 
PCP:  Lisa Sutherland MD 
Treatment Team: Attending Provider: Ilana Jernigan MD; Consulting Provider: Rocky Elliott MD; Consulting Provider: Nirav Lambert MD; Utilization Review: Juan Bailey RN; Care Manager: Neva Doherty; Hospitalist: Yvette Gupta NP; Primary Nurse: Pastor Salazar, OLYA; Nurse Practitioner: Storm Evans NP; Physical Therapist: Yolie Lanza PT 
 
DNR SUBJECTIVE:  
Mr. Clinton Smiley is a 68 yo male with PMH of DM II, HTN, CAD s/p CABG, s/p aortic valve repair, JOAQUIM on bipap, multiple DVTs on coumadin, new HD pt, HIT +, necrosis of toes/fingers from levophed who presented from HD with c/o acute sudden onset of SOB.  Was DC 20 from CABG and aortic valve replacement complicated by CKD requiring HD, HIT +, DVTs, wound left thigh on wound vac, pneumonia DC on levaquin Q 48 hours.  He was DC on 2 L O2 however has been increased to 4L O2 via NC at STR when he arrived. On 5 L NC O2 now.    he was at HD and reports dizziness with sitting up and acute onset of SOB. [facility] showed improving interstitial edema, unchanged left basilar opacity and left pleural effusion. Pt given lasix in ED. Chest CT showed unchanged in general, no PE. levaquin continued on admission EOT 20. Pt does have sacral wound present on admission.  Wound care consulted.  Pulmonary consulted. Had bronch  with findings of no obstruction, small effusion. Pt has been hypotensive, complicating HD. Has diarrhea today however family said has had since last hospitalization. Denies CP. Still with SOB. Past Medical History:  
Diagnosis Date  Arthritis  BPH (benign prostatic hyperplasia) 2013  CAD (coronary artery disease)  DM type 2 (diabetes mellitus, type 2) ([facility] dx  Type 2, avg fbs , recognizes hypo at 67, last HA1C was 9.2 on 5/2014  Dyspnea   
 at times, Uses Albuterol inhaler when needed (last used first of aug 2014)  Gout 1/14/2013  HLD (hyperlipidemia) 1/14/2013  
 HTN (hypertension)   
 controlled with meds  Morbid obesity ([address] 9/3/14 BMI 41.7  Psychiatric disorder   
 anxiety, controlled with buspar  Rheumatic fever   
 as a child  Seasonal allergic rhinitis   
 treated with Allegra  Severe aortic valve stenosis   
 echo 09/11/19 EF 60-65%- mild to moderate regurgitation  Thyroid disease   
 hx- no longer takes synthroid  Unspecified sleep apnea 2016  
 bi pap Recent Results (from the past 24 hour(s)) GLUCOSE, POC Collection Time: 02/17/20  4:35 PM  
Result Value Ref Range Glucose (POC) 92 65 - 100 mg/dL GLUCOSE, POC Collection Time: 02/17/20  9:56 PM  
Result Value Ref Range Glucose (POC) 229 (H) 65 - 100 mg/dL PROTHROMBIN TIME + INR Collection Time: 02/18/20  4:08 AM  
Result Value Ref Range Prothrombin time 30.1 (H) 12.0 - 14.7 sec INR 2.8    
CBC W/O DIFF Collection Time: 02/18/20  4:08 AM  
Result Value Ref Range WBC 10.9 4.3 - 11.1 K/uL  
 RBC 3.24 (L) 4.23 - 5.6 M/uL HGB 9.3 (L) 13.6 - 17.2 g/dL HCT 30.3 (L) 41.1 - 50.3 % MCV 93.5 79.6 - 97.8 FL  
 MCH 28.7 26.1 - 32.9 PG  
 MCHC 30.7 (L) 31.4 - 35.0 g/dL  
 RDW 14.0 11.9 - 14.6 % PLATELET 848 827 - 952 K/uL MPV 10.1 9.4 - 12.3 FL ABSOLUTE NRBC 0.00 0.0 - 0.2 K/uL METABOLIC PANEL, BASIC Collection Time: 02/18/20  4:08 AM  
Result Value Ref Range Sodium 135 (L) 136 - 145 mmol/L Potassium 4.3 3.5 - 5.1 mmol/L Chloride 101 98 - 107 mmol/L  
 CO2 27 21 - 32 mmol/L Anion gap 7 7 - 16 mmol/L Glucose 200 (H) 65 - 100 mg/dL BUN 58 (H) 8 - 23 MG/DL Creatinine 4.57 (H) 0.8 - 1.5 MG/DL  
 GFR est AA 16 (L) >60 ml/min/1.73m2 GFR est non-AA 13 (L) >60 ml/min/1.73m2  Calcium 7.8 (L) 8.3 - 10.4 MG/DL  
 GLUCOSE, POC Collection Time: 02/18/20  6:15 AM  
Result Value Ref Range Glucose (POC) 175 (H) 65 - 100 mg/dL GLUCOSE, POC Collection Time: 02/18/20 10:59 AM  
Result Value Ref Range Glucose (POC) 100 65 - 100 mg/dL Allergies Allergen Reactions  Heparin Other (comments) HIT antibody test strongly positive on 1/17/2020. SHARON drawn 1/18/2020 and resulted positive on 1/21/2020  Amoxicillin Rash  Keflex [Cephalexin] Rash  Pcn [Penicillins] Other (comments) \"felt closed in\". No rash Current Facility-Administered Medications Medication Dose Route Frequency Provider Last Rate Last Dose  warfarin (COUMADIN) tablet 2 mg  2 mg Oral QPM America Olmstead MD      
 levoFLOXacin (LEVAQUIN) tablet 500 mg  500 mg Oral Q48H Laura Olmstead MD      
 sodium chloride (NS) flush 5-40 mL  5-40 mL IntraVENous Q8H Pio Vaughn MD   5 mL at 02/17/20 2224  sodium chloride (NS) flush 5-40 mL  5-40 mL IntraVENous PRN Pio Vaughn MD      
 0.9% sodium chloride infusion  25 mL/hr IntraVENous CONTINUOUS Pio Vaughn MD   Stopped at 02/17/20 1245  sodium hypochlorite (QUARTER STRENGTH DAKIN'S) 0.125% irrigation (bottle)   Topical DAILY America Olmstead MD      
 loperamide (IMODIUM) capsule 4 mg  4 mg Oral QID PRN Lanie Neil MD   4 mg at 02/18/20 1147  
 albuterol (PROVENTIL VENTOLIN) nebulizer solution 2.5 mg  2.5 mg Nebulization Q6H RT Yoly Turpin NP   2.5 mg at 02/18/20 1343  sodium chloride 3% hypertonic nebulizer soln  4 mL Nebulization BID RT Andreas Simons MD   Stopped at 02/18/20 7269  pantothenic ac-min oil-pet,hyd (AQUAPHOR) 41 % ointment   Topical DAILY Sydney HOUSER NP      
 busPIRone (BUSPAR) tablet 10 mg  10 mg Oral TID Sydney HOUSER NP   10 mg at 02/18/20 1147  
 insulin glargine (LANTUS) injection 18 Units  18 Units SubCUTAneous QHS Raymona Roselyns, NP   18 Units at 02/17/20 5403  insulin glargine (LANTUS) injection 28 Units  28 Units SubCUTAneous DAILY Krzysztof Siegel NP   28 Units at 20 2184  mirtazapine (REMERON) tablet 15 mg  15 mg Oral QHS Layne HOUSER NP   15 mg at 20 2223  traMADol (ULTRAM) tablet 50 mg  50 mg Oral Q6H PRN Krzysztof Siegel NP      
 sodium chloride (NS) flush 5-40 mL  5-40 mL IntraVENous Q8H Layne HOUSER NP   5 mL at 20 2224  sodium chloride (NS) flush 5-40 mL  5-40 mL IntraVENous PRN Krzysztof Siegel NP      
 acetaminophen (TYLENOL) tablet 650 mg  650 mg Oral Q4H PRN Krzysztof Siegel NP      
 naloxone [facility]) injection 0.4 mg  0.4 mg IntraVENous PRN Krzysztof Siegel NP      
 ondansetron [facility]) injection 4 mg  4 mg IntraVENous Q4H PRN Krzysztof Siegel NP      
 bisacodyL (DULCOLAX) tablet 5 mg  5 mg Oral DAILY PRN Krzysztof Siegel NP      
 insulin regular (NOVOLIN R, HUMULIN R) injection   SubCUTAneous AC&HS Krzysztof Siegel NP   Stopped at 20 1130 Review of Systems negative with exception of pertinent positives noted above PHYSICAL EXAM  
 
Visit Vitals /44 (BP 1 Location: Right arm, BP Patient Position: At rest) Pulse 90 Temp 97.6 °F (36.4 °C) Resp 18 Ht 5' 10\" (1.778 m) Wt 113.9 kg (251 lb) SpO2 95% BMI 36.01 kg/m² Temp (24hrs), Av.1 °F (36.7 °C), Min:97.6 °F (36.4 °C), Max:98.7 °F (37.1 °C) Oxygen Therapy O2 Sat (%): 95 % (20 1343) Pulse via Oximetry: 89 beats per minute (20 1343) O2 Device: Nasal cannula (20 1343) O2 Flow Rate (L/min): 5 l/min (20 1343) Intake/Output Summary (Last 24 hours) at 2020 1410 Last data filed at 2020 1236 Gross per 24 hour Intake 240 ml Output 1350 ml Net -1110 ml General:       Alert, cooperative, appears stated age. Josetta Age, without obvious abnormality, atraumatic. Nose:            Nares normal. No drainage or sinus tenderness. Lungs:          crackles bases.  Otherwise CTA bilaterally.  Resp labored Heart:            S1S2 present with +murmur. No rubs or gallops. trace LE edema Abdomen:    Soft, non-tender. Not distended.  Bowel sounds normal. No masses Extremities: Wound vac to left inner thigh. Necrosis to fingers and toes post high dose pressors  
Skin:             Not jaundiced.  Surgical incision midsternal C/D/I, well approximated, non drainage Neurologic:  Alert and oriented X3. No focal deficits Results summary of Diagnostic Studies/Procedures copied from within [facility] EMR: 
 
 
ASSESSMENT Active Hospital Problems Diagnosis Date Noted  Hypotension 02/18/2020  Acute-on-chronic kidney injury ([facility] 02/17/2020  Atelectasis 02/17/2020  Fluid overload 02/15/2020  HCAP (healthcare-associated pneumonia) 02/14/2020  Pleural effusion 02/03/2020  
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) 01/23/2020  Atrial fibrillation ([facility] 01/13/2020  Acute hypoxemic respiratory failure ([facility] 01/10/2020  CAD (coronary artery disease) 01/10/2020  S/P AVR 01/10/2020  S/P CABG x 3 01/10/2020  DM type 2 (diabetes mellitus, type 2) ([facility] 01/14/2013  HLD (hyperlipidemia) 01/14/2013  
 HTN (hypertension) 01/14/2013 Plan: 
 
Acute on chronic hypoxic resp failure/pneumonia previous admission still on outpatient abx CT chest r/o PE, appears unchanged from last admission, DC 2/12 Pulmonary following Wean O2 as tolerated Continue levaquin every 48 hours total 8 days EOT 2/20/20 Flutter valve S/p bronch 2/17 with no obstruction Left effusion to small to tap 
  
DVT Continue coumadin INR 2.1, pharmacy to dose HIT + last admission 
  
CAD s/p CABG s/p AVR Stable Continue current regimen 
  
Hypotension Pulm/Nephro starting midodrine to be able to pull off more during HD 
  
DM II Continue home regimen Add SSI 
  
ESRD on HD Nephrology following Had HD today 
  
  
 Notes, labs, VS, diagnostic testing reviewed Time spent with pt 35 min 
  
  
DVT Prophylaxis: coumadin Plan of Care Discussed with: Supervising MD  Dr. Pacheco Mims, care team, pt 
  
  
  
Giles Clark NP

## 2020-02-18 NOTE — ROUTINE PROCESS
Bedside and Verbal shift change report given to Micah Pedro (oncoming nurse) by  Erlinda Balderas nurse). Report included the following information SBAR, Kardex, Intake/Output, MAR and Recent Results.

## 2020-02-18 NOTE — PROGRESS NOTES
PT attempted treatment 2 times this afternoon but RT was in there the 1st time and then the patient had diarrhea and said he was too fatigued to work with therapy this afternoon. PT will try again tomorrow.  
 
QUOC VeraT

## 2020-02-18 NOTE — ROUTINE PROCESS
Bedside and Verbal shift change report given to self (oncoming nurse) by Juan Carlos bautista RN (offgoing nurse). Report included the following information SBAR, Kardex, ED Summary, Procedure Summary, Intake/Output, MAR, Recent Results

## 2020-02-18 NOTE — PROGRESS NOTES
PT treatment attempted but the patient is off the floor for HD. PT will check back this afternoon.  
 
Mariela El DPT

## 2020-02-18 NOTE — DIALYSIS
TRANSFER OUT- DIALYSIS Hemodialysis treatment completed without complications. Patient alert and VS stable  /69  P 89   
 
 0.6 Kgs removed. Not able to remove full fluid goal due to hypotension during tx. Flushed both ports with 10 mL of NS.  CVC dressing clean, dry, and intact, tego caps intact, bilateral lumens wrapped with 4x4 gauze. Patient to room 334 after dialysis.

## 2020-02-19 NOTE — PROGRESS NOTES
Problem: Mobility Impaired (Adult and Pediatric) Goal: *Acute Goals and Plan of Care (Insert Text) Description LTG: 
(1.)Mr. Alaina Mcnally will move from supine to sit and sit to supine , scoot up and down and roll side to side with MODIFIED INDEPENDENCE within 7 treatment day(s) from flat surface. (2.)Mr. Alaina Mcnally will transfer from bed to chair and chair to bed with MODIFIED INDEPENDENCE using the least restrictive device within 7 treatment day(s). (3.)Mr. Alaina Mcnally will ambulate with SUPERVISION for 250+ feet with the least restrictive device within 7 treatment day(s) while maintaining normal vital signs. ____________________________________________________________________________________________ Outcome: Progressing Towards Goal 
  
PHYSICAL THERAPY: Daily Note and PM 2/19/2020 INPATIENT: PT Visit Days : 2 Payor: [employer] / Plan: Carmen Su / Product Type: [employer] Medicare /   
  
NAME/AGE/GENDER: Cally Gandhi is a 69 y.o. male PRIMARY DIAGNOSIS: Acute respiratory failure ([facility] [J96.00] Fluid overload [E87.70] Acute hypoxemic respiratory failure (HCC) Acute hypoxemic respiratory failure (HCC) Procedure(s) (LRB): BRONCHOSCOPY (N/A) BRONCHIAL ALVEOLAR LAVAGE (N/A) 2 Days Post-Op ICD-10: Treatment Diagnosis:  
 · Generalized Muscle Weakness (M62.81) · Difficulty in walking, Not elsewhere classified (R26.2) Precaution/Allergies: 
Heparin; Amoxicillin; Keflex [cephalexin]; and Pcn [penicillins] ASSESSMENT:  
 
Mr. Alaina Mcnally was supine in bed and agreeable to PT. He likes things done a certain way and was given re-assurance to assist with observed increased anxiety. Pt came to sitting on EOB with supervision. He required frequent seated rest breaks as well. Pt stood several times with CGA/RW and ambulated around room. He required frequent rest breaks and was given cuing for breathing techniques.   Pt then performed seated exercises before getting back to bed with assistance. Progress in ambulation and mobility tofay. This section established at most recent assessment PROBLEM LIST (Impairments causing functional limitations): 1. Decreased Strength 2. Decreased ADL/Functional Activities 3. Decreased Transfer Abilities 4. Decreased Ambulation Ability/Technique 5. Decreased Balance 6. Decreased Activity Tolerance 7. Increased Fatigue 8. Increased Shortness of Breath 9. Edema/Girth INTERVENTIONS PLANNED: (Benefits and precautions of physical therapy have been discussed with the patient.) 1. Balance Exercise 2. Bed Mobility 3. Family Education 4. Gait Training 5. Home Exercise Program (HEP) 6. Neuromuscular Re-education/Strengthening 7. Therapeutic Activites 8. Therapeutic Exercise/Strengthening 9. Transfer Training TREATMENT PLAN: Frequency/Duration: 3 times a week for duration of hospital stay Rehabilitation Potential For Stated Goals: Good REHAB RECOMMENDATIONS (at time of discharge pending progress):   
Placement: It is my opinion, based on this patient's performance to date, that Mr. Jose Manuel Roberson may benefit from intensive therapy at a [address] [place] [address] after discharge due to the functional deficits listed above that are likely to improve with skilled rehabilitation and concerns that he/she may be unsafe to be unsupervised at home due to a fall risk, safety concerns for transfers and ambulation at home. Equipment: ? To be determined HISTORY:  
History of Present Injury/Illness (Reason for Referral): Mr. 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   Was DC 2/12/20 from CABG and aortic valve replacement complicated by CKD requiring HD, HIT +, DVTs, wound left thigh on wound vac, pneumonia DC on levaquin Q 48 hours last dose 2/11/20. He was DC on 2 L O2 however has been increased to 4L O2 via NC at STR yesterday when he arrived. Today he was at HD and reports dizziness with sitting up and acute onset of SOB. Son at bedside and states pt was doing ok since DC yesterday until today at HD. INR 2.4. CXR showed improving interstitial edema, unchanged left basilar opacity and left pleural effusion. Pt given lasix in ED. Pt is SOB when talking in short sentences. Denies CP, n/v.  Has had diarrhea however is not new since hospitalization. Past Medical History/Comorbidities:  
Mr. Yoana Moore  has a past medical history of Arthritis, BPH (benign prostatic hyperplasia) (1/14/2013), CAD (coronary artery disease), DM type 2 (diabetes mellitus, type 2) ([facility] (dx 2004), Dyspnea, Gout (1/14/2013), HLD (hyperlipidemia) (1/14/2013), HTN (hypertension), Morbid obesity ([facility] (9/3/14), Psychiatric disorder, Rheumatic fever, Seasonal allergic rhinitis, Severe aortic valve stenosis, Thyroid disease, and Unspecified sleep apnea (2016). Mr. Yoana Moore  has a past surgical history that includes hx tonsil and adenoidectomy; hx heart catheterization (12/23/2019); hx orthopaedic (Left); hx cataract removal (Bilateral, 2012); and ir insert tunl cvc w port over 5 years (2/4/2020). Social History/Living Environment:  
Home Environment: Private residence # Steps to Enter: 2 Rails to Enter: No 
One/Two Story Residence: One story Living Alone: No 
Support Systems: Spouse/Significant Other/Partner Patient Expects to be Discharged to[de-identified] Rehabilitation facility Current DME Used/Available at Home: Cane, straight, Walker, rolling Tub or Shower Type: Shower Prior Level of Function/Work/Activity: 
Lives with spouse, admit from rehab; has been using RW short distance ambulation, assist ADLs Personal Factors:   
      Sex:  male Age:  69 y.o. Overall Behavior:  agreeable Number of Personal Factors/Comorbidities that affect the Plan of Care: 
CAD, DM, DVTs, age 3+: HIGH COMPLEXITY EXAMINATION:  
Most Recent Physical Functioning:  
Gross Assessment: 
  
         
  
Posture: 
  
Balance: 
Sitting: Intact Standing: Impaired Standing - Static: Good Standing - Dynamic : Fair Bed Mobility: 
Supine to Sit: Supervision Sit to Supine: Minimum assistance Interventions: Manual cues; Verbal cues Wheelchair Mobility: 
  
Transfers: 
Sit to Stand: Contact guard assistance;Stand-by assistance Stand to Sit: Contact guard assistance Bed to Chair: Contact guard assistance Interventions: Safety awareness training;Verbal cues; Visual cues Gait: 
  
Base of Support: Widened Speed/Federica: Slow Step Length: Right shortened;Left shortened Gait Abnormalities: Trunk sway increased Distance (ft): 16 Feet (ft) Assistive Device: Walker, rolling;Gait belt Ambulation - Level of Assistance: Contact guard assistance Interventions: Verbal cues; Tactile cues; Safety awareness training Body Structures Involved: 1. Heart 2. Lungs 3. Muscles Body Functions Affected: 1. Cardio 2. Respiratory 3. Movement Related Activities and Participation Affected: 1. General Tasks and Demands 2. Mobility 3. Self Care Number of elements that affect the Plan of Care: 4+: HIGH COMPLEXITY CLINICAL PRESENTATION:  
Presentation: Evolving clinical presentation with changing clinical characteristics: MODERATE COMPLEXITY CLINICAL DECISION MAKING:  
[employer] AM-[employer]AC 6 Clicks Basic Mobility Inpatient Short Form How much difficulty does the patient currently have. .. Unable A Lot A Little None 1. Turning over in bed (including adjusting bedclothes, sheets and blankets)? [] 1   [] 2   [x] 3   [] 4  
2.   Sitting down on and standing up from a chair with arms ( e.g., wheelchair, bedside commode, etc.)   [] 1   [] 2   [x] 3   [] 4  
 3. Moving from lying on back to sitting on the side of the bed? [] 1   [] 2   [x] 3   [] 4 How much help from another person does the patient currently need. .. Total A Lot A Little None 4. Moving to and from a bed to a chair (including a wheelchair)? [] 1   [] 2   [x] 3   [] 4  
5. Need to walk in hospital room? [] 1   [] 2   [x] 3   [] 4  
6. Climbing 3-5 steps with a railing? [] 1   [x] 2   [] 3   [] 4  
© 2007, Trustees of [address] [place] [address] [address] [id number], under license to [employer]. All rights reserved Score:  Initial: 17 Most Recent: X (Date: -- ) Interpretation of Tool:  Represents activities that are increasingly more difficult (i.e. Bed mobility, Transfers, Gait). Medical Necessity:    
· Patient is expected to demonstrate progress in  
· strength, range of motion, balance, and coordination ·  to  
· decrease assistance required with overall functional mobility, transfers, ambulation · . · Patient demonstrates · good ·  rehab potential due to higher previous functional level. Reason for Services/Other Comments: 
· Patient · continues to require present interventions due to patient's inability to perform bed mobility, transfers, ambulation safely and effectively · . Use of outcome tool(s) and clinical judgement create a POC that gives a: Clear prediction of patient's progress: LOW COMPLEXITY  
  
 
 
 
TREATMENT:  
(In addition to Assessment/Re-Assessment sessions the following treatments were rendered) Pre-treatment Symptoms/Complaints:  \"I get dizzy when I sit up\" Pain: Initial:  
Pain Intensity 1: 0  Post Session: 0 Gait Training ( ):  Gait training to improve and/or restore physical functioning as related to mobility, strength, balance, and coordination. Ambulated 16 Feet (ft) with Contact guard assistance using a Walker, rolling;Gait belt and minimal Verbal cues; Tactile cues; Safety awareness training related to their stance phase and stride length to promote proper body alignment, promote proper body posture, promote proper body mechanics, and promote proper body breathing techniques. Instruction in performance of posture, walker safety to correct overall functional mobility. Therapeutic Activity: (    24 minutes): Therapeutic activities including Bed transfers, Chair transfers, Ambulation on level ground and seated exercises to improve mobility, strength, balance and coordination. Required moderate Verbal cues; Tactile cues; Safety awareness training to promote static and dynamic balance in standing, promote coordination of bilateral, upper extremity(s), lower extremity(s) and safe use of RW for transfers/ambulation. Date: 
2/19/20 Date: 
 Date: Activity/Exercise Parameters Parameters Parameters LAQ 1 x 20 B Seated marching 1 x 15 B APs 2 x 20 B Braces/Orthotics/Lines/Etc:  
· Nasal cannula Treatment/Session Assessment:   
· Response to Treatment:  See above · Interdisciplinary Collaboration:  
o Physical Therapist 
o Registered Nurse 
o Physician · After treatment position/precautions:  
o Supine in bed 
o Bed/Chair-wheels locked 
o Bed in low position 
o Call light within reach 
o RN notified · Compliance with Program/Exercises: Will assess as treatment progresses · Recommendations/Intent for next treatment session: \"Next visit will focus on advancements to more challenging activities\". Total Treatment Duration: PT Patient Time In/Time Out Time In: 5823 Time Out: 8493 Sheba Gaspar, PT, DPT

## 2020-02-19 NOTE — ROUTINE PROCESS
Bedside and Verbal shift change report given to  (oncoming nurse) by Yue Loza and Arsalan Aguirre (offgoing nurse). Report included the following information SBAR, Kardex, MAR and Recent Results.

## 2020-02-19 NOTE — ROUTINE PROCESS
Bedside and Verbal shift change report to be given to [facility] (oncoming nurse) by  Erlinda Balderas nurse). Report included the following information SBAR, Kardex, MAR and Recent Results. RN to assume care of patient.

## 2020-02-19 NOTE — PROGRESS NOTES
Immanuel Dial Admission Date: 2/13/2020 Renal Daily Progress Note: 2/19/2020 Follow up ESRD Subjective:  
Patient seen and examined  Pt complaints of dizziness with sitting up. ROS: He denies CP, N/V/D, fever/ chills, appetite ok. Current Facility-Administered Medications Medication Dose Route Frequency  warfarin (COUMADIN) tablet 2 mg  2 mg Oral QPM  
 levoFLOXacin (LEVAQUIN) tablet 500 mg  500 mg Oral Q48H  
 sodium chloride (NS) flush 5-40 mL  5-40 mL IntraVENous Q8H  
 sodium chloride (NS) flush 5-40 mL  5-40 mL IntraVENous PRN  
 0.9% sodium chloride infusion  25 mL/hr IntraVENous CONTINUOUS  
 sodium hypochlorite (QUARTER STRENGTH DAKIN'S) 0.125% irrigation (bottle)   Topical DAILY  loperamide (IMODIUM) capsule 4 mg  4 mg Oral QID PRN  
 albuterol (PROVENTIL VENTOLIN) nebulizer solution 2.5 mg  2.5 mg Nebulization Q6H RT  
 sodium chloride 3% hypertonic nebulizer soln  4 mL Nebulization BID RT  
 pantothenic ac-min oil-pet,hyd (AQUAPHOR) 41 % ointment   Topical DAILY  busPIRone (BUSPAR) tablet 10 mg  10 mg Oral TID  insulin glargine (LANTUS) injection 18 Units  18 Units SubCUTAneous QHS  insulin glargine (LANTUS) injection 28 Units  28 Units SubCUTAneous DAILY  mirtazapine (REMERON) tablet 15 mg  15 mg Oral QHS  traMADol (ULTRAM) tablet 50 mg  50 mg Oral Q6H PRN  
 sodium chloride (NS) flush 5-40 mL  5-40 mL IntraVENous Q8H  
 sodium chloride (NS) flush 5-40 mL  5-40 mL IntraVENous PRN  
 acetaminophen (TYLENOL) tablet 650 mg  650 mg Oral Q4H PRN  
 naloxone (NARCAN) injection 0.4 mg  0.4 mg IntraVENous PRN  
 ondansetron (ZOFRAN) injection 4 mg  4 mg IntraVENous Q4H PRN  
 bisacodyL (DULCOLAX) tablet 5 mg  5 mg Oral DAILY PRN  
 insulin regular (NOVOLIN R, HUMULIN R) injection   SubCUTAneous AC&HS Objective:  
 
Vitals:  
 02/19/20 7570 02/19/20 0518 02/19/20 5313 02/19/20 6429 BP: 123/60   110/54 Pulse: 84   88 Resp: 18   18 Temp: 98.5 °F (36.9 °C)   97.9 °F (36.6 °C) SpO2: 94%  94% 93% Weight:  113.9 kg (251 lb 3.2 oz) Height:      
 
Intake and Output:  
02/17 1901 - 02/19 0700 In: 480 [P.O.:480] Out: 1225 [Urine:625] No intake/output data recorded. Physical Exam:  
Constitutional:  the patient is well developed and in no acute distress HEENT:  Sclera clear, pupils equal, oral mucosa moist 
Lungs: clear bilaterally, no wheezes Cardiovascular:  Regular rate, S1, S2, No Rub Abd/GI: soft and non-tender; with positive bowel sounds. Ext: warm without cyanosis. There is trace lower leg edema. Skin:  no jaundice or rashes Neuro: no gross neuro deficits Psychiatric: Calm. Access: Left TCC- intact LAB Recent Labs  
  02/19/20 
0530 02/18/20 
0408 02/17/20 
0502 WBC 7.7 10.9 10.3 HGB 8.7* 9.3* 9.5* HCT 27.6* 30.3* 30.2*  181 158 INR 2.5 2.8 2.1 Recent Labs  
  02/19/20 
0530 02/18/20 
0408 02/17/20 
0502  135* 135* K 4.1 4.3 4.2  101 100 CO2 28 27 26 * 200* 159* BUN 35* 58* 44* CREA 3.40* 4.57* 4.34* No results for input(s): PH, PCO2, PO2, HCO3 in the last 72 hours. Assessment:  (Medical Decision Making) Hospital Problems  Date Reviewed: 2/17/2020 Codes Class Noted POA Hypotension ICD-10-CM: I95.9 ICD-9-CM: 458.9  2/18/2020 Yes Acute-on-chronic kidney injury ([facility] ICD-10-CM: N17.9, N18.9 ICD-9-CM: 584.9, 585.9  2/17/2020 Yes Atelectasis ICD-10-CM: J98.11 ICD-9-CM: 518.0  2/17/2020 Yes Fluid overload ICD-10-CM: E87.70 ICD-9-CM: 276.69  2/15/2020 Yes HCAP (healthcare-associated pneumonia) ICD-10-CM: J18.9 ICD-9-CM: 342  2/14/2020 Yes Pleural effusion ICD-10-CM: J90 ICD-9-CM: 511.9  2/3/2020 Yes HIT (heparin-induced thrombocytopenia) (HCC) (Chronic) ICD-10-CM: R68.36 ICD-9-CM: 289.84  1/23/2020 Yes  Atrial fibrillation ([facility] ICD-10-CM: I48.91 
 ICD-9-CM: 427.31  1/13/2020 Yes CAD (coronary artery disease) (Chronic) ICD-10-CM: I25.10 ICD-9-CM: 414.00  1/10/2020 Yes * (Principal) Acute hypoxemic respiratory failure ([facility] ICD-10-CM: J96.01 
ICD-9-CM: 518.81  1/10/2020 Yes S/P CABG x 3 (Chronic) ICD-10-CM: Z95.1 ICD-9-CM: V45.81  1/10/2020 Yes S/P AVR (Chronic) ICD-10-CM: Z95.2 ICD-9-CM: V43.3  1/10/2020 Yes DM type 2 (diabetes mellitus, type 2) (HCC) (Chronic) ICD-10-CM: E11.9 ICD-9-CM: 250.00  1/14/2013 Yes HTN (hypertension) (Chronic) ICD-10-CM: I10 
ICD-9-CM: 401.9  1/14/2013 Yes HLD (hyperlipidemia) (Chronic) ICD-10-CM: O36.2 ICD-9-CM: 272.4  1/14/2013 Yes Plan:  (Medical Decision Making) 1. DEJUAN/CKD recently d/c DEJUAN with outpatient HD TTS 
-no sign of renal recovery 2. Dyspnea- 2/2 decondition- 
 
3. Hx HIT + 4. S/P CABG 5. Anemia 6. Orthostatic Hypotension initiate midodrine 7. Disposition out pt Rehab once stable from hypoxia Kristina Caal MD

## 2020-02-19 NOTE — PROGRESS NOTES
Care Management Interventions PCP Verified by CM: Yes Mode of Transport at Discharge: BLS(Hien White Spouse [phone] ) Transition of Care Consult (CM Consult): Discharge Planning, SNF Discharge Durable Medical Equipment: No 
Physical Therapy Consult: Yes Occupational Therapy Consult: Yes Speech Therapy Consult: No 
Current Support Network: [address] [place] [address] Confirm Follow Up Transport: Family The Plan for Transition of Care is Related to the Following Treatment Goals : SNF The Patient and/or Patient Representative was Provided with a Choice of Provider and Agrees with the Discharge Plan?: Yes Name of the Patient Representative Who was Provided with a Choice of Provider and Agrees with the Discharge Plan: Pt son Freedom of Choice List was Provided with Basic Dialogue that Supports the Patient's Individualized Plan of Care/Goals, Treatment Preferences and Shares the Quality Data Associated with the Providers?: Yes Discharge Location Discharge Placement: Rehab Unit Subacute Peer to peer requested by [employer]. NP aware. CM to continue to follow.

## 2020-02-19 NOTE — PROGRESS NOTES
Michael Horta Admission Date: 2/13/2020 Daily Progress Note: 2/19/2020 The patient's chart is reviewed and the patient is discussed with the staff.  69 y.o.  male seen and evaluated at the request of Dr. Leonarda Gutierrez for the evaluation of respiratory failure. He was just discharged on 2/12 to [facility] and came right back the next day. Was DC 2/12/20 from CABG and aortic valve replacement complicated by CKD requiring HD, HIT +, DVTs, wound left thigh on wound vac, pneumonia DC on levaquin Q 48 hours last dose 2/11/20. [facility] was DC on 2 L O2 however has been increased to 4L O2 via NC at STR yesterday when he arrived. he says he reports that during HD he felt dizzy  And had acute onset of SOB. He had chest CT with contrast- no PE, still LLL opacity. Bronch 2/17 - no obstruction, small effusion. On Levaquin for ? Pneumonia. S/p bronch with lavage with negative cultures Subjective: Anxious and feels the buspar is not helping, wants to know if we can change his meds. Working with PT. Coughing up secretions. Current Facility-Administered Medications Medication Dose Route Frequency  midodrine (PROAMITINE) tablet 5 mg  5 mg Oral TID WITH MEALS  warfarin (COUMADIN) tablet 2 mg  2 mg Oral QPM  
 levoFLOXacin (LEVAQUIN) tablet 500 mg  500 mg Oral Q48H  
 sodium chloride (NS) flush 5-40 mL  5-40 mL IntraVENous Q8H  
 sodium chloride (NS) flush 5-40 mL  5-40 mL IntraVENous PRN  
 0.9% sodium chloride infusion  25 mL/hr IntraVENous CONTINUOUS  
 sodium hypochlorite (QUARTER STRENGTH DAKIN'S) 0.125% irrigation (bottle)   Topical DAILY  loperamide (IMODIUM) capsule 4 mg  4 mg Oral QID PRN  
 albuterol (PROVENTIL VENTOLIN) nebulizer solution 2.5 mg  2.5 mg Nebulization Q6H RT  
 sodium chloride 3% hypertonic nebulizer soln  4 mL Nebulization BID RT  
 pantothenic ac-min oil-pet,hyd (AQUAPHOR) 41 % ointment   Topical DAILY  busPIRone (BUSPAR) tablet 10 mg  10 mg Oral TID  insulin glargine (LANTUS) injection 18 Units  18 Units SubCUTAneous QHS  insulin glargine (LANTUS) injection 28 Units  28 Units SubCUTAneous DAILY  mirtazapine (REMERON) tablet 15 mg  15 mg Oral QHS  traMADol (ULTRAM) tablet 50 mg  50 mg Oral Q6H PRN  
 sodium chloride (NS) flush 5-40 mL  5-40 mL IntraVENous Q8H  
 sodium chloride (NS) flush 5-40 mL  5-40 mL IntraVENous PRN  
 acetaminophen (TYLENOL) tablet 650 mg  650 mg Oral Q4H PRN  
 naloxone (NARCAN) injection 0.4 mg  0.4 mg IntraVENous PRN  
 ondansetron (ZOFRAN) injection 4 mg  4 mg IntraVENous Q4H PRN  
 bisacodyL (DULCOLAX) tablet 5 mg  5 mg Oral DAILY PRN  
 insulin regular (NOVOLIN R, HUMULIN R) injection   SubCUTAneous AC&HS Objective:  
 
Vitals:  
 02/19/20 6114 02/19/20 0518 02/19/20 8463 02/19/20 2931 BP: 123/60   110/54 Pulse: 84   88 Resp: 18   18 Temp: 98.5 °F (36.9 °C)   97.9 °F (36.6 °C) SpO2: 94%  94% 93% Weight:  251 lb 3.2 oz (113.9 kg) Height:      
 
Intake and Output:  
02/17 1901 - 02/19 0700 In: 480 [P.O.:480] Out: 1225 [Urine:625] 02/19 0701 - 02/19 1900 In: 26 [P.O.:237] Out: - Physical Exam:  
Constitutional:  the patient is well developed and in no acute distress HEENT:  Sclera clear, pupils equal, oral mucosa moist 
Lungs: a few crackles from posterior. On 3 LPM  
Cardiovascular:  RRR without M,G,R 
Abd/GI: soft and non-tender; with positive bowel sounds. Ext: warm without cyanosis. There is no lower leg edema. Musculoskeletal: moves all four extremities with equal strength Skin:  no jaundice or rashes, ischemic digits Neuro: no gross neuro deficits Musculoskeletal: can't ambulate - needs assistance. No deformity Psychiatric: Calm. Review of Systems - Review of Systems Constitutional: Positive for malaise/fatigue. Respiratory: Positive for shortness of breath. Cardiovascular: Negative. Gastrointestinal: Positive for diarrhea. Lines:  Dialysis catheter CHEST XRAY:   
 
 
LAB Recent Labs  
  02/19/20 
0530 02/18/20 
0408 02/17/20 
0502 WBC 7.7 10.9 10.3 HGB 8.7* 9.3* 9.5* HCT 27.6* 30.3* 30.2*  181 158 INR 2.5 2.8 2.1 Recent Labs  
  02/19/20 
0530 02/18/20 
0408 02/17/20 
0502  135* 135* K 4.1 4.3 4.2  101 100 CO2 28 27 26 * 200* 159* BUN 35* 58* 44* CREA 3.40* 4.57* 4.34* Assessment:  (Medical Decision Making) Hospital Problems  Date Reviewed: 2/17/2020 Codes Class Noted POA Hypotension ICD-10-CM: I95.9 ICD-9-CM: 458.9  2/18/2020 Yes Discussed with nephrology. Limits dialysis Acute-on-chronic kidney injury ([facility] ICD-10-CM: N17.9, N18.9 ICD-9-CM: 584.9, 585.9  2/17/2020 Yes Dialysis today. No signs of recovery Atelectasis ICD-10-CM: J98.11 ICD-9-CM: 518.0  2/17/2020 Yes Bronch yesterday. No obstruction Fluid overload ICD-10-CM: E87.70 ICD-9-CM: 276.69  2/15/2020 Yes As above HCAP (healthcare-associated pneumonia) ICD-10-CM: J18.9 ICD-9-CM: 714  2/14/2020 Yes Bronch washings - nl yessenia so far Pleural effusion ICD-10-CM: J90 ICD-9-CM: 511.9  2/3/2020 Yes Small HIT (heparin-induced thrombocytopenia) (HCC) (Chronic) ICD-10-CM: D44.34 ICD-9-CM: 289.84  1/23/2020 Yes Therapeutic INR Atrial fibrillation ([facility] ICD-10-CM: I48.91 
ICD-9-CM: 427.31  1/13/2020 Yes Sinus per telemetry CAD (coronary artery disease) (Chronic) ICD-10-CM: I25.10 ICD-9-CM: 414.00  1/10/2020 Yes S/p surgery * (Principal) Acute hypoxemic respiratory failure ([facility] ICD-10-CM: J96.01 
ICD-9-CM: 518.81  1/10/2020 Yes Remains on oxygen. + dyspnea. CXR with edema, small effusion, atx S/P CABG x 3 (Chronic) ICD-10-CM: Z95.1 ICD-9-CM: V45.81  1/10/2020 Yes S/P AVR (Chronic) ICD-10-CM: Z95.2 ICD-9-CM: V43.3  1/10/2020 Yes DM type 2 (diabetes mellitus, type 2) (HCC) (Chronic) ICD-10-CM: E11.9 ICD-9-CM: 250.00  1/14/2013 Yes BS mid 100s to 200s HTN (hypertension) (Chronic) ICD-10-CM: I10 
ICD-9-CM: 401.9  1/14/2013 Yes Currently hypotensive with dialysis HLD (hyperlipidemia) (Chronic) ICD-10-CM: V46.7 ICD-9-CM: 272.4  1/14/2013 Yes Plan:  (Medical Decision Making) -- taper oxygen as tolerated. --change from buspar to lexapro since that is not helping 
--add xanax prn 0.25 mg BID, prn 
--complete 7 days of abx on 5/7 with cultures from BAL negative. ?PNA 
--continue nebs 
--PT/OT 
--continue remaining treatment per Jeannine Mix MD  
 
More than 50% of time documented was spent face-to-face contact with the patient and in the care of the patient on the floor/unit where the patient is located.

## 2020-02-19 NOTE — PROGRESS NOTES
This note will not be viewable in [address] Care Transition Nurse [facility] Date/Time:  2020 11:49 AM 
 
Patient is identified as High Risk for Readmission. Medical History: Mr. 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  Was DC 20 from CABG and aortic valve replacement complicated by CKD requiring HD, HIT +, DVTs, wound left thigh on wound vac, pneumonia DC on levaquin Q 48 hours.  He was DC on 2 L O2 however has been increased to 4L O2 via NC at STR when he arrived. Advanced Care Planning:   
Does patient have an Advance Directive:  not on file Inpatient RUR score: 27% Was this a readmission? yes Care Transition Nurse (CTN) introduced self to the patient. Verified name and  with patient as identifiers. Provided explanation of the CTN role. Patients top risk factors for readmission:  medical condition, depression, financial, functional cognitive ability, functional physical ability, ineffective coping, lack of knowledge about disease, level of motivation, medical condition, medication management, multi health system providers, PCP relationship, polypharmacy, stages of grief, support system, transportation, utilization of services Medication Reconciliation:  
Medication reconciliation was performed with patient, who verbalizes understanding of administration of home medications. There were no barriers to obtaining medications identified at this time. No current facility-administered medications for this visit. No current outpatient medications on file. Facility-Administered Medications Ordered in Other Visits Medication Dose Route Frequency  midodrine (PROAMITINE) tablet 5 mg  5 mg Oral TID WITH MEALS  
  warfarin (COUMADIN) tablet 2 mg  2 mg Oral QPM  
 levoFLOXacin (LEVAQUIN) tablet 500 mg  500 mg Oral Q48H  
 sodium chloride (NS) flush 5-40 mL  5-40 mL IntraVENous Q8H  
 sodium chloride (NS) flush 5-40 mL  5-40 mL IntraVENous PRN  
 0.9% sodium chloride infusion  25 mL/hr IntraVENous CONTINUOUS  
 sodium hypochlorite (QUARTER STRENGTH DAKIN'S) 0.125% irrigation (bottle)   Topical DAILY  loperamide (IMODIUM) capsule 4 mg  4 mg Oral QID PRN  
 albuterol (PROVENTIL VENTOLIN) nebulizer solution 2.5 mg  2.5 mg Nebulization Q6H RT  
 sodium chloride 3% hypertonic nebulizer soln  4 mL Nebulization BID RT  
 pantothenic ac-min oil-pet,hyd (AQUAPHOR) 41 % ointment   Topical DAILY  busPIRone (BUSPAR) tablet 10 mg  10 mg Oral TID  insulin glargine (LANTUS) injection 18 Units  18 Units SubCUTAneous QHS  insulin glargine (LANTUS) injection 28 Units  28 Units SubCUTAneous DAILY  mirtazapine (REMERON) tablet 15 mg  15 mg Oral QHS  traMADol (ULTRAM) tablet 50 mg  50 mg Oral Q6H PRN  
 sodium chloride (NS) flush 5-40 mL  5-40 mL IntraVENous Q8H  
 sodium chloride (NS) flush 5-40 mL  5-40 mL IntraVENous PRN  
 acetaminophen (TYLENOL) tablet 650 mg  650 mg Oral Q4H PRN  
 naloxone (NARCAN) injection 0.4 mg  0.4 mg IntraVENous PRN  
 ondansetron (ZOFRAN) injection 4 mg  4 mg IntraVENous Q4H PRN  
 bisacodyL (DULCOLAX) tablet 5 mg  5 mg Oral DAILY PRN  
 insulin regular (NOVOLIN R, HUMULIN R) injection   SubCUTAneous AC&HS Discharge Plan: tentative to SNF, patient with HD. Lives alone with son within close proximity to asst. 
Patient unable to verbalize medication regimen, informed to reach out to son after d/c to assist with medication regiemen Goals  Identification of barriers to adherence to a plan of care such as inability to afford medications, lack of insurance, lack of transportation, etc.   
  This note will not be viewable in MyChart. Assess barriers to safe and effective d/c AEB Patient able to obtain medicine after d/c Patient able to verbalize medicine changes Patient is aware and attends follow up appointments s/p d/c.

## 2020-02-19 NOTE — PROGRESS NOTES
Progress Note 2020 Admit Date: 2020  3:03 PM  
NAME: Yariel Maher :  1945 MRN:  [id number] Attending: Dina Melchor MD 
PCP:  Ramon Juarez MD 
Treatment Team: Attending Provider: Dina Melchor MD; Consulting Provider: Jesus Lester MD; Consulting Provider: Monty Leblanc MD; Utilization Review: Ja Astudillo RN; Care Manager: Shivam French; Hospitalist: Micha Salas NP; Nurse Practitioner: Suleman Butts NP; Primary Nurse: Herber Ha; Occupational Therapist: Shayy Richards OT; Physical Therapist: Abdoulaye Naranjo DPT 
 
DNR SUBJECTIVE:  
Mr. 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  Was DC 20 from CABG and aortic valve replacement complicated by CKD requiring HD, HIT +, DVTs, wound left thigh on wound vac, pneumonia DC on levaquin Q 48 hours.  He was DC on 2 L O2 however has been increased to 4L O2 via NC at STR when he arrived. On 5 L NC O2 now.    he was at HD and reports dizziness with sitting up and acute onset of SOB. [facility] showed improving interstitial edema, unchanged left basilar opacity and left pleural effusion. Pt given lasix in ED. Chest CT showed unchanged in general, no PE. levaquin continued on admission EOT 20. Pt does have sacral wound present on admission.  Wound care consulted.  Pulmonary consulted.  Had bronch  with findings of no obstruction, small effusion. Pt has been hypotensive, complicating HD. Started on midodrine for hypotension in hopes of pulling off more fluid during HD. Denies CP. Still with SOB, dizziness.  
  
 
Past Medical History:  
Diagnosis Date  Arthritis  BPH (benign prostatic hyperplasia) 2013  CAD (coronary artery disease)  DM type 2 (diabetes mellitus, type 2) ([facility] dx 2004 Type 2, avg fbs , recognizes hypo at 67, last HA1C was 9.2 on 5/2014  Dyspnea   
 at times, Uses Albuterol inhaler when needed (last used first of aug 2014)  Gout 1/14/2013  HLD (hyperlipidemia) 1/14/2013  
 HTN (hypertension)   
 controlled with meds  Morbid obesity ([facility] 9/3/14 BMI 41.7  Psychiatric disorder   
 anxiety, controlled with buspar  Rheumatic fever   
 as a child  Seasonal allergic rhinitis   
 treated with Allegra  Severe aortic valve stenosis   
 echo 09/11/19 EF 60-65%- mild to moderate regurgitation  Thyroid disease   
 hx- no longer takes synthroid  Unspecified sleep apnea 2016  
 bi pap Recent Results (from the past 24 hour(s)) GLUCOSE, POC Collection Time: 02/18/20  3:43 PM  
Result Value Ref Range Glucose (POC) 203 (H) 65 - 100 mg/dL GLUCOSE, POC Collection Time: 02/18/20  9:56 PM  
Result Value Ref Range Glucose (POC) 304 (H) 65 - 100 mg/dL PROTHROMBIN TIME + INR Collection Time: 02/19/20  5:30 AM  
Result Value Ref Range Prothrombin time 27.9 (H) 12.0 - 14.7 sec INR 2.5    
CBC W/O DIFF Collection Time: 02/19/20  5:30 AM  
Result Value Ref Range WBC 7.7 4.3 - 11.1 K/uL  
 RBC 2.98 (L) 4.23 - 5.6 M/uL HGB 8.7 (L) 13.6 - 17.2 g/dL HCT 27.6 (L) 41.1 - 50.3 % MCV 92.6 79.6 - 97.8 FL  
 MCH 29.2 26.1 - 32.9 PG  
 MCHC 31.5 31.4 - 35.0 g/dL  
 RDW 14.0 11.9 - 14.6 % PLATELET 351 733 - 946 K/uL MPV 9.7 9.4 - 12.3 FL ABSOLUTE NRBC 0.00 0.0 - 0.2 K/uL METABOLIC PANEL, BASIC Collection Time: 02/19/20  5:30 AM  
Result Value Ref Range Sodium 137 136 - 145 mmol/L Potassium 4.1 3.5 - 5.1 mmol/L Chloride 102 98 - 107 mmol/L  
 CO2 28 21 - 32 mmol/L Anion gap 7 7 - 16 mmol/L Glucose 116 (H) 65 - 100 mg/dL BUN 35 (H) 8 - 23 MG/DL Creatinine 3.40 (H) 0.8 - 1.5 MG/DL  
 GFR est AA 23 (L) >60 ml/min/1.73m2 GFR est non-AA 19 (L) >60 ml/min/1.73m2 Calcium 7.5 (L) 8.3 - 10.4 MG/DL  
GLUCOSE, POC Collection Time: 02/19/20  6:26 AM  
Result Value Ref Range Glucose (POC) 114 (H) 65 - 100 mg/dL GLUCOSE, POC Collection Time: 02/19/20 11:22 AM  
Result Value Ref Range Glucose (POC) 291 (H) 65 - 100 mg/dL Allergies Allergen Reactions  Heparin Other (comments) HIT antibody test strongly positive on 1/17/2020. SHARON drawn 1/18/2020 and resulted positive on 1/21/2020  Amoxicillin Rash  Keflex [Cephalexin] Rash  Pcn [Penicillins] Other (comments) \"felt closed in\". No rash Current Facility-Administered Medications Medication Dose Route Frequency Provider Last Rate Last Dose  midodrine (PROAMITINE) tablet 5 mg  5 mg Oral TID WITH MEALS Ryder Ray MD      
 warfarin (COUMADIN) tablet 2 mg  2 mg Oral QPM Rico Olmstead MD   2 mg at 02/18/20 1801  levoFLOXacin (LEVAQUIN) tablet 500 mg  500 mg Oral Q48H Laura Olmstead MD   500 mg at 02/18/20 2220  
 sodium chloride (NS) flush 5-40 mL  5-40 mL IntraVENous Q8H Felisa Leroy MD   5 mL at 02/19/20 0518  sodium chloride (NS) flush 5-40 mL  5-40 mL IntraVENous PRN Felisa Leroy MD      
 0.9% sodium chloride infusion  25 mL/hr IntraVENous CONTINUOUS Felisa Leroy MD   Stopped at 02/17/20 1245  sodium hypochlorite (QUARTER STRENGTH DAKIN'S) 0.125% irrigation (bottle)   Topical DAILY Rico Olmstead MD      
 loperamide (IMODIUM) capsule 4 mg  4 mg Oral QID PRN Ryder Ray MD   4 mg at 02/18/20 1147  
 albuterol (PROVENTIL VENTOLIN) nebulizer solution 2.5 mg  2.5 mg Nebulization Q6H RT Pranav Dickerson NP   2.5 mg at 02/19/20 1358  sodium chloride 3% hypertonic nebulizer soln  4 mL Nebulization BID RT Laurie Jimenez MD   4 mL at 02/19/20 6181  pantothenic ac-min oil-pet,hyd (AQUAPHOR) 41 % ointment   Topical DAILY Amy Dunn NP      
  busPIRone (BUSPAR) tablet 10 mg  10 mg Oral TID Bobbi HOUSER NP   10 mg at 20 7607  insulin glargine (LANTUS) injection 18 Units  18 Units SubCUTAneous QHS Bobbi HOUSER NP   18 Units at 20  
 insulin glargine (LANTUS) injection 28 Units  28 Units SubCUTAneous DAILY Guanaco Andino NP   28 Units at 20 7164  mirtazapine (REMERON) tablet 15 mg  15 mg Oral QHS Bobbi HOUSER NP   15 mg at 20 222  traMADol (ULTRAM) tablet 50 mg  50 mg Oral Q6H PRN Guanaco Andino NP      
 sodium chloride (NS) flush 5-40 mL  5-40 mL IntraVENous Q8H Bobbi HOUSER NP   10 mL at 20  sodium chloride (NS) flush 5-40 mL  5-40 mL IntraVENous PRN Guanaco Andino NP      
 acetaminophen (TYLENOL) tablet 650 mg  650 mg Oral Q4H PRN Guanaco Andino NP      
 naloxone [facility]) injection 0.4 mg  0.4 mg IntraVENous PRN Guanaco Andino NP      
 ondansetron [facility]) injection 4 mg  4 mg IntraVENous Q4H PRN Guanaco Andino NP      
 bisacodyL (DULCOLAX) tablet 5 mg  5 mg Oral DAILY PRN Guanaco Andino NP      
 insulin regular (NOVOLIN R, HUMULIN R) injection   SubCUTAneous AC&HS Guanaco Andino NP   Stopped at 20 0730 Review of Systems negative with exception of pertinent positives noted above PHYSICAL EXAM  
 
Visit Vitals /54 Pulse 88 Temp 97.9 °F (36.6 °C) Resp 18 Ht 5' 10\" (1.778 m) Wt 113.9 kg (251 lb 3.2 oz) SpO2 93% BMI 36.04 kg/m² Temp (24hrs), Av.4 °F (36.9 °C), Min:97.6 °F (36.4 °C), Max:99.6 °F (37.6 °C) Oxygen Therapy O2 Sat (%): 93 % (20 0851) Pulse via Oximetry: 84 beats per minute (20 0752) O2 Device: Nasal cannula (20) O2 Flow Rate (L/min): 5 l/min (20) FIO2 (%): 40 % (20) Intake/Output Summary (Last 24 hours) at 2020 1157 Last data filed at 2020 1055 Gross per 24 hour Intake 477 ml Output 225 ml  
 Net 252 ml General:       Alert, cooperative, appears stated age. Marcelo Caraballo, without obvious abnormality, atraumatic. Nose:            Nares normal. No drainage or sinus tenderness. Lungs:         diminished bases.  Otherwise CTA bilaterally.  Resp labored Heart:            S1S2 present with +murmur. No rubs or gallops. trace LE edema Abdomen:    Soft, non-tender. Not distended.  Bowel sounds normal. No masses Extremities: Wound vac to left inner thigh. Necrosis to fingers and toes post high dose pressors  
Skin:             Not jaundiced.  Surgical incision midsternal C/D/I, well approximated, non drainage Neurologic:  Alert and oriented X3. No focal deficits Results summary of Diagnostic Studies/Procedures copied from within [facility] EMR: 
 
 
ASSESSMENT Active Hospital Problems Diagnosis Date Noted  Hypotension 02/18/2020  Acute-on-chronic kidney injury ([facility] [address] 02/17/2020  Atelectasis 02/17/2020  Fluid overload 02/15/2020  HCAP (healthcare-associated pneumonia) 02/14/2020  Pleural effusion 02/03/2020  
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) 01/23/2020  Atrial fibrillation ([facility] 01/13/2020  Acute hypoxemic respiratory failure ([facility] 01/10/2020  CAD (coronary artery disease) 01/10/2020  S/P AVR 01/10/2020  S/P CABG x 3 01/10/2020  DM type 2 (diabetes mellitus, type 2) ([facility] [address] 01/14/2013  HLD (hyperlipidemia) 01/14/2013  
 HTN (hypertension) 01/14/2013 Plan: 
 
Acute on chronic hypoxic resp failure/pneumonia previous admission still on outpatient abx CT chest r/o PE, appears unchanged from last admission, DC 2/12 Pulmonary following Wean O2 as tolerated Continue levaquin every 48 hours total 8 days EOT 2/20/20 Flutter valve S/p bronch 2/17 with no obstruction Left effusion to small to tap 
  
DVT Continue coumadin INR 2.5, pharmacy to dose HIT + last admission 
  
CAD s/p CABG s/p AVR Stable Continue current regimen 
  
Hypotension Pulm/Nephro starting midodrine to be able to pull off more during HD 
  
DM II Continue home regimen Add SSI 
  
ESRD on HD Nephrology following Had HD today 
  
  
Notes, labs, VS, diagnostic testing reviewed Time spent with pt 35 min 
  
  
DVT Prophylaxis: coumadin Plan of Care Discussed with: Supervising MD Dr. Valerie Oliva, care team, pt 
  
  
  
Ida Grissom, NP

## 2020-02-20 NOTE — ROUTINE PROCESS
Bedside and Verbal shift change report to be given to [facility] (oncoming nurse) by  Bryce benitez). Report included the following information SBAR, Kardex, Intake/Output, MAR and Recent Results. RN to assume care of patient.

## 2020-02-20 NOTE — PROGRESS NOTES
Yariel Soclair Admission Date: 2/13/2020 Daily Progress Note: 2/20/2020 The patient's chart is reviewed and the patient is discussed with the staff.  69 y.o.  male seen and evaluated at the request of Dr. Abdon Mireles for the evaluation of respiratory failure. He was just discharged on 2/12 to [facility] and came right back the next day. Was DC 2/12/20 from CABG and aortic valve replacement complicated by CKD requiring HD, HIT +, DVTs, wound left thigh on wound vac, pneumonia DC on levaquin Q 48 hours last dose 2/11/20. Ekta Carty was DC on 2 L O2 however has been increased to 4L O2 via NC at STR yesterday when he arrived. he says he reports that during HD he felt dizzy  And had acute onset of SOB. He had chest CT with contrast- no PE, still LLL opacity. Bronch 2/17 - no obstruction, small effusion. On Levaquin for ? Pneumonia. S/p bronch with lavage with negative cultures Subjective:  
 
Completed HD this morning. Current Facility-Administered Medications Medication Dose Route Frequency  warfarin (COUMADIN) tablet 3 mg  3 mg Oral QPM  
 midodrine (PROAMITINE) tablet 5 mg  5 mg Oral TID WITH MEALS  escitalopram oxalate (LEXAPRO) tablet 10 mg  10 mg Oral DAILY  ALPRAZolam (XANAX) tablet 0.25 mg  0.25 mg Oral BID PRN  
 levoFLOXacin (LEVAQUIN) tablet 500 mg  500 mg Oral Q48H  
 sodium chloride (NS) flush 5-40 mL  5-40 mL IntraVENous Q8H  
 sodium chloride (NS) flush 5-40 mL  5-40 mL IntraVENous PRN  
 0.9% sodium chloride infusion  25 mL/hr IntraVENous CONTINUOUS  
 sodium hypochlorite (QUARTER STRENGTH DAKIN'S) 0.125% irrigation (bottle)   Topical DAILY  loperamide (IMODIUM) capsule 4 mg  4 mg Oral QID PRN  
 albuterol (PROVENTIL VENTOLIN) nebulizer solution 2.5 mg  2.5 mg Nebulization Q6H RT  
 sodium chloride 3% hypertonic nebulizer soln  4 mL Nebulization BID RT  
 pantothenic ac-min oil-pet,hyd (AQUAPHOR) 41 % ointment   Topical DAILY  insulin glargine (LANTUS) injection 18 Units  18 Units SubCUTAneous QHS  insulin glargine (LANTUS) injection 28 Units  28 Units SubCUTAneous DAILY  mirtazapine (REMERON) tablet 15 mg  15 mg Oral QHS  traMADol (ULTRAM) tablet 50 mg  50 mg Oral Q6H PRN  
 sodium chloride (NS) flush 5-40 mL  5-40 mL IntraVENous Q8H  
 sodium chloride (NS) flush 5-40 mL  5-40 mL IntraVENous PRN  
 acetaminophen (TYLENOL) tablet 650 mg  650 mg Oral Q4H PRN  
 naloxone (NARCAN) injection 0.4 mg  0.4 mg IntraVENous PRN  
 ondansetron (ZOFRAN) injection 4 mg  4 mg IntraVENous Q4H PRN  
 bisacodyL (DULCOLAX) tablet 5 mg  5 mg Oral DAILY PRN  
 insulin regular (NOVOLIN R, HUMULIN R) injection   SubCUTAneous AC&HS Objective:  
 
Vitals:  
 02/20/20 0959 02/20/20 1030 02/20/20 1116 02/20/20 1135 BP: 103/77 97/65 92/52 127/71 Pulse: 65 76 84 80 Resp:      
Temp:      
SpO2:      
Weight:      
Height:      
 
Intake and Output:  
02/18 1901 - 02/20 0700 In: 9449 [P.O.:1074] Out: 350 [Urine:350] 02/20 0701 - 02/20 1900 In: -  
Out: 2400 Physical Exam:  
Constitutional:  the patient is well developed and in no acute distress HEENT:  Sclera clear, pupils equal, oral mucosa moist 
Lungs: a few crackles from posterior. On 3 LPM  
Cardiovascular:  RRR without M,G,R 
Abd/GI: soft and non-tender; with positive bowel sounds. Ext: warm without cyanosis. There is no lower leg edema. Musculoskeletal: moves all four extremities with equal strength Skin:  no jaundice or rashes, ischemic digits Neuro: no gross neuro deficits Musculoskeletal: can't ambulate - needs assistance. No deformity Psychiatric: Calm. Review of Systems - Review of Systems Constitutional: Positive for malaise/fatigue. Respiratory: Positive for shortness of breath. Cardiovascular: Negative. Gastrointestinal: Positive for diarrhea. Lines:  Dialysis catheter CHEST XRAY:   
 
 
LAB Recent Labs  
  02/20/20 
0539 02/19/20 0530 02/18/20 
0408 WBC 8.1 7.7 10.9 HGB 8.8* 8.7* 9.3* HCT 28.6* 27.6* 30.3*  174 181 INR 1.9 2.5 2.8 Recent Labs  
  02/20/20 
0539 02/19/20 
0530 02/18/20 
0408  137 135* K 4.2 4.1 4.3  102 101 CO2 28 28 27 GLU 93 116* 200* BUN 48* 35* 58* CREA 3.56* 3.40* 4.57* ALB 1.7*  --   --   
SGOT 38*  --   --   
 
 
 
 
Assessment:  (Medical Decision Making) Hospital Problems  Date Reviewed: 2/17/2020 Codes Class Noted POA Hypotension ICD-10-CM: I95.9 ICD-9-CM: 458.9  2/18/2020 Yes Discussed with nephrology. Limits dialysis Acute-on-chronic kidney injury ([facility] ICD-10-CM: N17.9, N18.9 ICD-9-CM: 584.9, 585.9  2/17/2020 Yes Dialysis today. No signs of recovery Atelectasis ICD-10-CM: J98.11 ICD-9-CM: 518.0  2/17/2020 Yes Bronch yesterday. No obstruction Fluid overload ICD-10-CM: E87.70 ICD-9-CM: 276.69  2/15/2020 Yes As above HCAP (healthcare-associated pneumonia) ICD-10-CM: J18.9 ICD-9-CM: 981  2/14/2020 Yes Bronch washings - nl yessenia so far Pleural effusion ICD-10-CM: J90 ICD-9-CM: 511.9  2/3/2020 Yes Small HIT (heparin-induced thrombocytopenia) (HCC) (Chronic) ICD-10-CM: K62.92 ICD-9-CM: 289.84  1/23/2020 Yes Therapeutic INR Atrial fibrillation ([facility] ICD-10-CM: I48.91 
ICD-9-CM: 427.31  1/13/2020 Yes Sinus per telemetry CAD (coronary artery disease) (Chronic) ICD-10-CM: I25.10 ICD-9-CM: 414.00  1/10/2020 Yes S/p surgery * (Principal) Acute hypoxemic respiratory failure ([facility] ICD-10-CM: J96.01 
ICD-9-CM: 518.81  1/10/2020 Yes Remains on oxygen. + dyspnea. CXR with edema, small effusion, atx S/P CABG x 3 (Chronic) ICD-10-CM: Z95.1 ICD-9-CM: V45.81  1/10/2020 Yes S/P AVR (Chronic) ICD-10-CM: Z95.2 ICD-9-CM: V43.3  1/10/2020 Yes DM type 2 (diabetes mellitus, type 2) (HCC) (Chronic) ICD-10-CM: E11.9 ICD-9-CM: 250.00  1/14/2013 Yes BS mid 100s to 200s HTN (hypertension) (Chronic) ICD-10-CM: I10 
ICD-9-CM: 401.9  1/14/2013 Yes Currently hypotensive with dialysis HLD (hyperlipidemia) (Chronic) ICD-10-CM: I66.0 ICD-9-CM: 272.4  1/14/2013 Yes Plan:  (Medical Decision Making) --Wean O2 as tolerated 
--Continue lexapro 
--Continue xanax prn 0.25 mg BID, prn 
--complete 7 days of abx on 6/7 with cultures from BAL negative. ?PNA 
--continue nebulizers 
--PT/OT Goldy Smiley, NP More than 50% of time documented was spent face-to-face contact with the patient and in the care of the patient on the floor/unit where the patient is located. Lungs:  clear Heart:  RRR with no Murmur/Rubs/Gallops Additional Comments:  Depressed- frustrated at his progress continue to wean 02 ,completed ABX I have spoken with and examined the patient. I agree with the above assessment and plan as documented.  
 
Zaire Díaz MD

## 2020-02-20 NOTE — PROGRESS NOTES
OT NOTE: 
 
OT treatment attempted, however pt unable to be seen d/t being off the floor. Will check back later as schedule and timing permits.  
 
Salome Ross, OTR/L, CLT

## 2020-02-20 NOTE — PROGRESS NOTES
Nutrition Assessment for:  
Best Practice Alert for Pressure Injury stage 2 or greater 
  
Assessment: Admitted with A/chronic hypoxic respiratory failure/pna, DVT, CAD s/p CABG s/p AVR, HTN, DM, ESRD on HD. 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 Patient was seen with RN at bedside. He states that he has not eaten lunch or dinner today. He states that he does not eat well on HD days due to fatigue and poor appetite. He states that he generally eats well on non-HD days. He states that he has always been a good eater and is frustrated with his lack of appetite. He does agree to drink a Nepro during RD visit, and actually drinks 2 Nepro prior to RD leaving. DIET DIABETIC CONSISTENT CARB Regular; 2 GM NA (House Low NA) DIET Nepro with dinner Skin Status per Wound Care:  
coccyx gluteal cleft wound - improving per wound care, full thickness slough and mild erythema surrounding, demarcating, moderate amount of serosanguinous drainage Left thigh upper wound is one of the vein harvest sites, from CABG - also improving per wound care Anthropometrics: 
Height: 5' 10\" (177.8 cm), Weight: 99.8 kg (220 lb 0.3 oz), Weight Source: Bed, Body mass index is 31.57 kg/m². BMI class of obese. His weight has fluctuated per EMR review but is likely associated with start of HD, fluid status an/or poor po intake. Macronutrient needs: (using IBW (Ideal body weight) 75.5 kg) EER: 0901-1991 kcal/day (25-30 kcal/kg) EPR:  g/day (1.2-1.4 g/kg) 
   
Intake/Comparative Standards: Per nursing documentation, patient is eating 100% of some meals or 0%.  This is consistent with patient report of eating well on non-HD days and little to nothing on HD days.  
   
Nutrition Intervention: 
Meals and snacks: Continue current diet, provide chopped meats as needed per pt request. 
Medical food supplement therapy: Increase Nepro to TID - patient prefers berry. Instructed patient that if eating well on non-HD days to save Nepro for HD days when intake is poor. He voiced understanding. Coordination of Care: Discussed with Adriana Hay Discharge Nutrition Plan: Too soon to determine. 
  
[address] [facility], [address], [address] [facility]

## 2020-02-20 NOTE — DIALYSIS
TRANSFER OUT- DIALYSIS Hemodialysis treatment completed without complications. Patient alert and VS stable  /71  P 80   
 
 2.4 Kgs removed. Flushed both ports with 10 mL of NS.  CVC dressing clean, dry, and intact, tego caps intact, bilateral lumens wrapped with 4x4 gauze. Patient continued to have multiple loose stools during tx. Patient to room 334 after dialysis.

## 2020-02-20 NOTE — DIALYSIS
TRANSFER IN - DIALYSIS Received patient in dialysis unit  from [facility] (unit) for ordered procedure. Consent verified for renal replacement therapy. Patient alert and vital signs stable. /67 P80 Hemodialysis initiated using LPC. Aspirated and flushed both ports without difficulty. Dressing clean, dry and intact. Machine settings per MD order. Will monitor during treatment.

## 2020-02-20 NOTE — PROGRESS NOTES
Warfarin dosing per pharmacist 
 
Josh Sanchez is a 69 y.o. male. Height: 5' 10\" (177.8 cm)    Weight: 114.6 kg (252 lb 11.2 oz) Indication:  AVR and afib Goal INR:  2.5 - 3.5 Home dose:  2.5 mg daily Risk factors or significant drug interactions:  Levofloxacin (2/13-  ), mirtazapine (started 2/13), escitalopram (started 2/19); amiodarone (dc'd 2/5) Other anticoagulants:  N/A Daily Monitoring Date  INR     Warfarin dose HGB              Notes 2/14  2.4  3 mg  8.7 2/15  2.2  4 mg  9.6 2/16  2.1  5 mg  9.8 2/17  2.1  5 mg  9.5 2/18  2.8  2 mg  9.3 2/19  2.5  2 mg  8.7 
2/20  1.9  3 mg   8.8 A/P:  INR sub-therapeutic. Recommend to increase warfarin dose to 3 mg every evening. Note: drug-drug interactions, may increase INR and risk of bleeding (levofloxacin, mirtazapine, escitalopram). Daily INRs. Pharmacy will continue to follow. Thank you, Andreas Simons, PharmD, BCCCP  [facility] [email or web address]

## 2020-02-20 NOTE — PROGRESS NOTES
Patient's sacral would dressing coming off with slight saturation. Dressing changed per order. Patient tolerated well.

## 2020-02-20 NOTE — ROUTINE PROCESS
Bedside and Verbal shift change report given to  (oncoming nurse) by Elissa Maldonado (offgoing nurse). Report included the following information SBAR, Kardex, Intake/Output, MAR and Recent Results.

## 2020-02-20 NOTE — PROGRESS NOTES
Problem: Pressure Injury - Risk of 
Goal: *Prevention of pressure injury Description Document Alfredo Scale and appropriate interventions in the flowsheet. Outcome: Not Progressing Towards Goal 
Note: Pressure Injury Interventions: 
Sensory Interventions: Avoid rigorous massage over bony prominences, Assess need for specialty bed, Discuss PT/OT consult with provider, Keep linens dry and wrinkle-free Moisture Interventions: Apply protective barrier, creams and emollients, Limit adult briefs, Contain wound drainage Activity Interventions: Increase time out of bed, Pressure redistribution bed/mattress(bed type), PT/OT evaluation, Assess need for specialty bed Mobility Interventions: Float heels, HOB 30 degrees or less, Trapeze to reposition, Turn and reposition approx. every two hours(pillow and wedges) Nutrition Interventions: Document food/fluid/supplement intake Friction and Shear Interventions: Lift sheet, Foam dressings/transparent film/skin sealants, HOB 30 degrees or less, Minimize layers Problem: Falls - Risk of 
Goal: *Absence of Falls Description Document Francisco Javier Son Fall Risk and appropriate interventions in the flowsheet. Outcome: Progressing Towards Goal 
Note: Fall Risk Interventions: 
Mobility Interventions: Communicate number of staff needed for ambulation/transfer, Bed/chair exit alarm, Patient to call before getting OOB Medication Interventions: Bed/chair exit alarm, Evaluate medications/consider consulting pharmacy, Patient to call before getting OOB, Teach patient to arise slowly Elimination Interventions: Patient to call for help with toileting needs, Elevated toilet seat, Call light in reach, Bed/chair exit alarm Problem: Breathing Pattern - Ineffective Goal: *Absence of hypoxia Outcome: Progressing Towards Goal

## 2020-02-21 NOTE — PROGRESS NOTES
Patient requested to use BSC,  Patient sat up on side of bed, placed both feet on floor, then this RN unfastened Brief tabs, on lt and rt side, then patient placed his hands onto handles of BSC, he pushed the BSC forward, stood up, then bent his knees, fell onto knees, then torso moving forward, arms relaxed positioned, did not grab onto, reach out during forward motion, head hit wall.

## 2020-02-21 NOTE — PROGRESS NOTES
Hospitalist Progress Note Subjective:  
Daily Progress Note: 2/20/2020 8:46 PM 
 
Acutely and chronically ill patient admitted 2/13 with sudden SOB after first dialysis. He was  Dialysis discharged 2/12 after CABG with aortic valve replacement, complicated by worsening CKD, now requiring HD, HIT, non healing wound left thigh with wound vac, pneumonia. Discharged on levaquin q 48 hours. He was also discharged on 2 liters of oxygen, increased to 4 on arrival at [facility] for unclear reasons. Chest CT in ER unchanged. Wound care and pulmonary consulted. Bronch 2/17 with findings of small effusion. Sacral wound still present on admission. Hypotensive. Begun on midodrine. Nephro on board. 3/20; Dialysis today, tired afterward. Reports loose stools. Discussed antidepressants at length with patient and son. Patient has taken buspar for 20+ years, recently cut dose inadvertently per self from 10 mg tid to 5 mg bid. Was given correct dose on admission, then changed yesterday to [id number] [address]. Am uncomfortable changing this med while hospitalized as unable to follow. Remains somewhat anxious, depressed due to setbacks. Will return to prescribed dose with prn xanax and advised to follow up with primary mental health provider on discharge. Legs remain edematous. No new complaints. Waiting for placement. Finished levaquin. 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  
 
Current Facility-Administered Medications Medication Dose Route Frequency  warfarin (COUMADIN) tablet 3 mg  3 mg Oral QPM  
 busPIRone (BUSPAR) tablet 10 mg  10 mg Oral TID  ALPRAZolam (XANAX) tablet 0.5 mg  0.5 mg Oral QID PRN  
 midodrine (PROAMITINE) tablet 5 mg  5 mg Oral TID WITH MEALS  
 levoFLOXacin (LEVAQUIN) tablet 500 mg  500 mg Oral Q48H  sodium chloride (NS) flush 5-40 mL  5-40 mL IntraVENous Q8H  
 sodium chloride (NS) flush 5-40 mL  5-40 mL IntraVENous PRN  
 0.9% sodium chloride infusion  25 mL/hr IntraVENous CONTINUOUS  
 sodium hypochlorite (QUARTER STRENGTH DAKIN'S) 0.125% irrigation (bottle)   Topical DAILY  loperamide (IMODIUM) capsule 4 mg  4 mg Oral QID PRN  
 albuterol (PROVENTIL VENTOLIN) nebulizer solution 2.5 mg  2.5 mg Nebulization Q6H RT  
 sodium chloride 3% hypertonic nebulizer soln  4 mL Nebulization BID RT  
 pantothenic ac-min oil-pet,hyd (AQUAPHOR) 41 % ointment   Topical DAILY  insulin glargine (LANTUS) injection 18 Units  18 Units SubCUTAneous QHS  insulin glargine (LANTUS) injection 28 Units  28 Units SubCUTAneous DAILY  mirtazapine (REMERON) tablet 15 mg  15 mg Oral QHS  traMADol (ULTRAM) tablet 50 mg  50 mg Oral Q6H PRN  
 sodium chloride (NS) flush 5-40 mL  5-40 mL IntraVENous Q8H  
 sodium chloride (NS) flush 5-40 mL  5-40 mL IntraVENous PRN  
 acetaminophen (TYLENOL) tablet 650 mg  650 mg Oral Q4H PRN  
 naloxone (NARCAN) injection 0.4 mg  0.4 mg IntraVENous PRN  
 ondansetron (ZOFRAN) injection 4 mg  4 mg IntraVENous Q4H PRN  
 bisacodyL (DULCOLAX) tablet 5 mg  5 mg Oral DAILY PRN  
 insulin regular (NOVOLIN R, HUMULIN R) injection   SubCUTAneous AC&HS Review of Systems A comprehensive review of systems was negative except for that written in the HPI. Objective:  
 
Visit Vitals /71 (BP 1 Location: Left arm, BP Patient Position: At rest) Pulse 98 Temp 97.6 °F (36.4 °C) Resp 18 Ht 5' 10\" (1.778 m) Wt 114.6 kg (252 lb 11.2 oz) SpO2 97% BMI 36.26 kg/m² O2 Flow Rate (L/min): 4 l/min O2 Device: Nasal cannula Temp (24hrs), Av.3 °F (36.8 °C), Min:97.6 °F (36.4 °C), Max:98.6 °F (37 °C) 
 
02/19 07 -  1900 In: Betty [P.O.:954] Out: 2433 [Urine:450] General appearance: Oriented and alert, cooperative, obese. Son at bedside. Head: Normocephalic, without obvious abnormality, atraumatic Eyes: conjunctivae/corneas clear. PERRL Throat: Lips, mucosa, and tongue normal. Teeth and gums normal 
Neck: supple, symmetrical, trachea midline, and no JVD Lungs: Scattered crackles, otherwise clear to auscultation bilaterally Heart: regular rate and rhythm, S1, S2 normal, no murmur, click, rub or gallop Abdomen: soft, non-tender. Bowel sounds normal. No masses,  no organomegaly Extremities: extremities normal, atraumatic, no cyanosis or edema Skin: Skin color, texture, turgor normal. No rashes or lesions Neurologic: Grossly normal 
 
Additional comments: Notes,orders, test results, vitals reviewed Data Review Recent Results (from the past 24 hour(s)) GLUCOSE, POC Collection Time: 02/19/20  9:18 PM  
Result Value Ref Range Glucose (POC) 289 (H) 65 - 100 mg/dL PROTHROMBIN TIME + INR Collection Time: 02/20/20  5:39 AM  
Result Value Ref Range Prothrombin time 22.7 (H) 12.0 - 14.7 sec INR 1.9    
CBC WITH AUTOMATED DIFF Collection Time: 02/20/20  5:39 AM  
Result Value Ref Range WBC 8.1 4.3 - 11.1 K/uL  
 RBC 3.06 (L) 4.23 - 5.6 M/uL HGB 8.8 (L) 13.6 - 17.2 g/dL HCT 28.6 (L) 41.1 - 50.3 % MCV 93.5 79.6 - 97.8 FL  
 MCH 28.8 26.1 - 32.9 PG  
 MCHC 30.8 (L) 31.4 - 35.0 g/dL  
 RDW 13.9 11.9 - 14.6 % PLATELET 050 176 - 033 K/uL MPV 9.7 9.4 - 12.3 FL ABSOLUTE NRBC 0.00 0.0 - 0.2 K/uL  
 DF AUTOMATED NEUTROPHILS 56 43 - 78 % LYMPHOCYTES 30 13 - 44 % MONOCYTES 8 4.0 - 12.0 % EOSINOPHILS 6 0.5 - 7.8 % BASOPHILS 1 0.0 - 2.0 % IMMATURE GRANULOCYTES 0 0.0 - 5.0 %  
 ABS. NEUTROPHILS 4.5 1.7 - 8.2 K/UL  
 ABS. LYMPHOCYTES 2.4 0.5 - 4.6 K/UL  
 ABS. MONOCYTES 0.6 0.1 - 1.3 K/UL  
 ABS. EOSINOPHILS 0.5 0.0 - 0.8 K/UL  
 ABS. BASOPHILS 0.1 0.0 - 0.2 K/UL  
 ABS. IMM. GRANS. 0.0 0.0 - 0.5 K/UL METABOLIC PANEL, COMPREHENSIVE Collection Time: 02/20/20  5:39 AM  
Result Value Ref Range Sodium 138 136 - 145 mmol/L Potassium 4.2 3.5 - 5.1 mmol/L Chloride 105 98 - 107 mmol/L  
 CO2 28 21 - 32 mmol/L Anion gap 5 (L) 7 - 16 mmol/L Glucose 93 65 - 100 mg/dL BUN 48 (H) 8 - 23 MG/DL Creatinine 3.56 (H) 0.8 - 1.5 MG/DL  
 GFR est AA 22 (L) >60 ml/min/1.73m2 GFR est non-AA 18 (L) >60 ml/min/1.73m2 Calcium 7.8 (L) 8.3 - 10.4 MG/DL Bilirubin, total 0.4 0.2 - 1.1 MG/DL  
 ALT (SGPT) 36 12 - 65 U/L  
 AST (SGOT) 38 (H) 15 - 37 U/L Alk. phosphatase 91 50 - 136 U/L Protein, total 5.5 (L) 6.3 - 8.2 g/dL Albumin 1.7 (L) 3.2 - 4.6 g/dL Globulin 3.8 (H) 2.3 - 3.5 g/dL A-G Ratio 0.4 (L) 1.2 - 3.5 GLUCOSE, POC Collection Time: 02/20/20  6:07 AM  
Result Value Ref Range Glucose (POC) 99 65 - 100 mg/dL GLUCOSE, POC Collection Time: 02/20/20 10:47 AM  
Result Value Ref Range Glucose (POC) 92 65 - 100 mg/dL GLUCOSE, POC Collection Time: 02/20/20  4:40 PM  
Result Value Ref Range Glucose (POC) 204 (H) 65 - 100 mg/dL GLUCOSE, POC Collection Time: 02/20/20  7:40 PM  
Result Value Ref Range Glucose (POC) 276 (H) 65 - 100 mg/dL 2/17:  CXR;  Unchanged left lung base atelectasis or infiltrate and small pleural effusion. 2/13:  CT CHEST:  No evidence of pulmonary embolism. Left lower lobe atelectasis or pneumonia with a left pleural effusion. Linear atelectasis of the right middle and right upper lobes. Dilated diffuse fluid-filled esophagus. No evidence of a hiatal hernia or obstructing mass. I question of the patient could have gastroesophageal reflux disease. Coronary artery disease status post CABG. Assessment/Plan:  
Acute on chronic hypoxemic respiratory failure, HCAP Finished levaquin No PE, see CT above Pulmonary on board Weaning oxygen, down to 3 liters Flutter valve 2/17:  Bronch with no obstruction Left Pleural effusion too small to tap DVT: 
 Continue coumadin with target INR 2.5 
 HIT + last admission DM type 2 Continue SSI and home mes Hypotension with baseline HTN Continue midodrine Holding all antihypertensives HLD: Home meds CAD with 3 vessel CABG and AVR 1/10/20 Continue coumadin and post op orders. Chronic Atrial fibrillation:  Continue meds Fluid overload Dialysis ESRD on new HD Dialysis today Nephrology on board Care Plan discussed with: Patient/Family and Nurse Signed By: Isabella Courtney NP February 20, 2020

## 2020-02-21 NOTE — PROGRESS NOTES
Problem: Mobility Impaired (Adult and Pediatric) Goal: *Acute Goals and Plan of Care (Insert Text) Description LTG: 
(1.)Mr. Charmaine Tavarez will move from supine to sit and sit to supine , scoot up and down and roll side to side with MODIFIED INDEPENDENCE within 7 treatment day(s) from flat surface. (2.)Mr. Charmaine Tavarez will transfer from bed to chair and chair to bed with MODIFIED INDEPENDENCE using the least restrictive device within 7 treatment day(s). (3.)Mr. Charmaine Tavarez will ambulate with SUPERVISION for 250+ feet with the least restrictive device within 7 treatment day(s) while maintaining normal vital signs. ____________________________________________________________________________________________ Outcome: Progressing Towards Goal 
  
PHYSICAL THERAPY: Daily Note and AM 2/21/2020 INPATIENT: PT Visit Days : 3 Payor: Clarisa Kearney / Plan: [employer] / Product Type: [employer] /   
  
NAME/AGE/GENDER: Martha Mendoza is a 69 y.o. male PRIMARY DIAGNOSIS: [facility] [J96.00] Fluid overload [E87.70] Acute hypoxemic respiratory failure (HCC) Acute hypoxemic respiratory failure (HCC) Procedure(s) (LRB): BRONCHOSCOPY (N/A) BRONCHIAL ALVEOLAR LAVAGE (N/A) 4 Days Post-Op ICD-10: Treatment Diagnosis:  
 · Generalized Muscle Weakness (M62.81) · Difficulty in walking, Not elsewhere classified (R26.2) Precaution/Allergies: 
Heparin; Amoxicillin; Keflex [cephalexin]; and Pcn [penicillins] ASSESSMENT:  
 
Mr. Charmaine Tavarez was sitting up on side of bed with PCT on contact. Pt stood and ambulated a few steps to [facility] using rolling walker and CGA. Pt stood a few times to get cleaned up. Pt then stood and ambulated about 4' to chair, rested, then performed below LE exercises. Pt stood and ambulated about 22' with rolling walker and CGA. Pt ambulates with a slower lluvia and gets a little SOB with activity, cues for proper breathing. Pt also required additional time for mobility. pt likes to know what the plan is and what is expected next. Pt seems to get a little anxious at times during session, re-assurance given to assist with this. Pt left up in chair sitting on a cushion, alarm on, and needs in reach. Pt is making progress with ambulation and overall mobility. Will continue with POC. This section established at most recent assessment PROBLEM LIST (Impairments causing functional limitations): 1. Decreased Strength 2. Decreased ADL/Functional Activities 3. Decreased Transfer Abilities 4. Decreased Ambulation Ability/Technique 5. Decreased Balance 6. Decreased Activity Tolerance 7. Increased Fatigue 8. Increased Shortness of Breath 9. Edema/Girth INTERVENTIONS PLANNED: (Benefits and precautions of physical therapy have been discussed with the patient.) 1. Balance Exercise 2. Bed Mobility 3. Family Education 4. Gait Training 5. Home Exercise Program (HEP) 6. Neuromuscular Re-education/Strengthening 7. Therapeutic Activites 8. Therapeutic Exercise/Strengthening 9. Transfer Training TREATMENT PLAN: Frequency/Duration: 3 times a week for duration of hospital stay Rehabilitation Potential For Stated Goals: Good REHAB RECOMMENDATIONS (at time of discharge pending progress):   
Placement: It is my opinion, based on this patient's performance to date, that Mr. Iliana Ross may benefit from intensive therapy at a [address] [place] [address] after discharge due to the functional deficits listed above that are likely to improve with skilled rehabilitation and concerns that he/she may be unsafe to be unsupervised at home due to a fall risk, safety concerns for transfers and ambulation at home. Equipment: ? To be determined HISTORY:  
History of Present Injury/Illness (Reason for Referral): Mr. Iliana Ross is a 68 yo male with PMH of DM II, HTN, CAD s/p CABG, s/p aortic valve repair, JOAQUIM on bipap, multiple DVTs on coumadin, new HD pt, HIT +, necrosis of toes/fingers from levophed who presented from HD with c/o acute sudden onset of SOB. Was DC 2/12/20 from CABG and aortic valve replacement complicated by CKD requiring HD, HIT +, DVTs, wound left thigh on wound vac, pneumonia DC on levaquin Q 48 hours last dose 2/11/20. He was DC on 2 L O2 however has been increased to 4L O2 via NC at STR yesterday when he arrived. Today he was at HD and reports dizziness with sitting up and acute onset of SOB. Son at bedside and states pt was doing ok since DC yesterday until today at HD. INR 2.4. CXR showed improving interstitial edema, unchanged left basilar opacity and left pleural effusion. Pt given lasix in ED. Pt is SOB when talking in short sentences. Denies CP, n/v.  Has had diarrhea however is not new since hospitalization. Past Medical History/Comorbidities:  
Mr. Lula Polanco  has a past medical history of Arthritis, BPH (benign prostatic hyperplasia) (1/14/2013), CAD (coronary artery disease), DM type 2 (diabetes mellitus, type 2) ([facility] (dx 2004), Dyspnea, Gout (1/14/2013), HLD (hyperlipidemia) (1/14/2013), HTN (hypertension), Morbid obesity ([facility] (9/3/14), Psychiatric disorder, Rheumatic fever, Seasonal allergic rhinitis, Severe aortic valve stenosis, Thyroid disease, and Unspecified sleep apnea (2016). Mr. Lula Polanco  has a past surgical history that includes hx tonsil and adenoidectomy; hx heart catheterization (12/23/2019); hx orthopaedic (Left); hx cataract removal (Bilateral, 2012); and ir insert tunl cvc w port over 5 years (2/4/2020). Social History/Living Environment:  
Home Environment: Private residence # Steps to Enter: 2 Rails to Enter: No 
One/Two Story Residence: One story Living Alone: No 
Support Systems: Spouse/Significant Other/Partner Patient Expects to be Discharged to[de-identified] Rehabilitation facility Current DME Used/Available at Home: Cane, straight, Walker, rolling Tub or Shower Type: Shower Prior Level of Function/Work/Activity: 
Lives with spouse, admit from rehab; has been using RW short distance ambulation, assist ADLs Personal Factors:   
      Sex:  male Age:  69 y.o. Overall Behavior:  agreeable Number of Personal Factors/Comorbidities that affect the Plan of Care: 
CAD, DM, DVTs, age 3+: HIGH COMPLEXITY EXAMINATION:  
Most Recent Physical Functioning:  
Gross Assessment: 
  
         
  
Posture: 
  
Balance: 
Sitting: Intact Standing - Static: Good Standing - Dynamic : Fair Bed Mobility: 
  
Wheelchair Mobility: 
  
Transfers: 
Sit to Stand: Contact guard assistance Stand to Sit: Contact guard assistance Bed to Chair: Contact guard assistance Interventions: Safety awareness training;Verbal cues Gait: 
  
Base of Support: Widened Speed/Federica: Slow Step Length: Left shortened;Right shortened Gait Abnormalities: Trunk sway increased Distance (ft): 22 Feet (ft) Assistive Device: Walker, rolling Ambulation - Level of Assistance: Contact guard assistance Interventions: Safety awareness training; Tactile cues; Verbal cues Body Structures Involved: 1. Heart 2. Lungs 3. Muscles Body Functions Affected: 1. Cardio 2. Respiratory 3. Movement Related Activities and Participation Affected: 1. General Tasks and Demands 2. Mobility 3. Self Care Number of elements that affect the Plan of Care: 4+: HIGH COMPLEXITY CLINICAL PRESENTATION:  
Presentation: Evolving clinical presentation with changing clinical characteristics: MODERATE COMPLEXITY CLINICAL DECISION [facility]KIN [facility] [address] [id number] AM-PAC 6 Clicks Basic Mobility Inpatient Short Form How much difficulty does the patient currently have. .. Unable A Lot A Little None 1. Turning over in bed (including adjusting bedclothes, sheets and blankets)?    [] 1   [] 2   [x] 3   [] 4  
 2.  Sitting down on and standing up from a chair with arms ( e.g., wheelchair, bedside commode, etc.)   [] 1   [] 2   [x] 3   [] 4  
3. Moving from lying on back to sitting on the side of the bed? [] 1   [] 2   [x] 3   [] 4 How much help from another person does the patient currently need. .. Total A Lot A Little None 4. Moving to and from a bed to a chair (including a wheelchair)? [] 1   [] 2   [x] 3   [] 4  
5. Need to walk in hospital room? [] 1   [] 2   [x] 3   [] 4  
6. Climbing 3-5 steps with a railing? [] 1   [x] 2   [] 3   [] 4  
© 2007, Trustees of [address] [place] [address] [address] [id number], under license to [employer]. All rights reserved Score:  Initial: 17 Most Recent: X (Date: -- ) Interpretation of Tool:  Represents activities that are increasingly more difficult (i.e. Bed mobility, Transfers, Gait). Medical Necessity:    
· Patient is expected to demonstrate progress in  
· strength, range of motion, balance, and coordination ·  to  
· decrease assistance required with overall functional mobility, transfers, ambulation · . · Patient demonstrates · good ·  rehab potential due to higher previous functional level. Reason for Services/Other Comments: 
· Patient · continues to require present interventions due to patient's inability to perform bed mobility, transfers, ambulation safely and effectively · . Use of outcome tool(s) and clinical judgement create a POC that gives a: Clear prediction of patient's progress: LOW COMPLEXITY  
  
 
 
 
TREATMENT:  
(In addition to Assessment/Re-Assessment sessions the following treatments were rendered) Pre-treatment Symptoms/Complaints:  \"I get dizzy when I sit up\" Pain: Initial:  
   Post Session: 0 Gait Training ( ):  Gait training to improve and/or restore physical functioning as related to mobility, strength, balance, and coordination.   Ambulated 22 Feet (ft) with Contact guard assistance using a Walker, rolling and minimal Safety awareness training; Tactile cues; Verbal cues related to their stance phase and stride length to promote proper body alignment, promote proper body posture, promote proper body mechanics, and promote proper body breathing techniques. Instruction in performance of posture, walker safety to correct overall functional mobility. Therapeutic Activity: (    28 minutes): Therapeutic activities including Bed transfers, Chair transfers, and Ambulation on level ground to improve mobility, strength, balance and coordination. Required moderate Safety awareness training; Tactile cues; Verbal cues to promote static and dynamic balance in standing, promote coordination of bilateral, upper extremity(s), lower extremity(s) and safe use of RW for transfers/ambulation. Therapeutic Exercise: (  10 minutes):  Exercises per grid below to improve mobility and strength. Required minimal visual and verbal cues to promote proper body posture and promote proper body mechanics. Progressed range and repetitions as indicated. Date: 
2/19/20 Date: 
2/21/20 Date: Activity/Exercise Parameters Parameters Parameters LAQ 1 x 20 B X 20 B Seated marching 1 x 15 B X 15 B APs 2 x 20 B X 20 B Hip abd  X 15 B Braces/Orthotics/Lines/Etc:  
· Nasal cannula Treatment/Session Assessment:   
· Response to Treatment:  See above · Interdisciplinary Collaboration:  
o Physical Therapy Assistant 
o Registered Nurse 
o Certified Nursing Assistant/Patient Care Technician · After treatment position/precautions:  
o Up in chair 
o Bed alarm/tab alert on 
o Bed/Chair-wheels locked 
o Bed in low position 
o Call light within reach 
o RN notified · Compliance with Program/Exercises: Will assess as treatment progresses · Recommendations/Intent for next treatment session: \"Next visit will focus on advancements to more challenging activities\". Total Treatment Duration: PT Patient Time In/Time Out Time In: 1176 Time Out: 1120 Jackie Thrasher, PTA

## 2020-02-21 NOTE — ROUTINE PROCESS
Verbal and bedside report received from Janet Leonardo RN. Pt fell around 1930 under previous nurses care. RN to contact physician. Awaiting orders.

## 2020-02-21 NOTE — PROGRESS NOTES
Confirmed by nursing patient with fall from bed with abrasion to ear and hitting his head. Patient on Coumadin with INR 1.9. He is at increased risk for bleeding. Plan: 
-Stat head CT 
-Neurochecks now on every 4 for 3 occurrences

## 2020-02-21 NOTE — PROGRESS NOTES
Rita Ambrosio Admission Date: 2/13/2020 Daily Progress Note: 2/21/2020 The patient's chart is reviewed and the patient is discussed with the staff. Pt is a 68 yo  male with a history of CAD s/p CABG with AVR 1/10/20 by Dr. Nargis Palafox complicated by CKD requiring HD, HIT +, DVTs, wound left thigh on wound vac, pneumonia DC on levaquin Q 48 hours last dose 2/11/20. [facility] was DC on 2 L O2 however has been increased to 4L O2 via NC at [facility]. He presented to the ER on 2/15 with increased shortness of breath. CTA chest was negative for PE but persistent LLL opacity. He had a bronch 2/17 with negative cultures. Pt is being treated for PNA. Pt now ESRD and being followed by nephrology. Subjective:  
 
Pt lying in bed on 4L O2. Pt complains of dizziness and fatigue. He also complains that he needs to have a BM. Pt had fall last night out of bed when he got up and head CT negative. Pt reminded to ask for assistance. He then told me he didn't need to have BM. Current Facility-Administered Medications Medication Dose Route Frequency  [START ON 2/22/2020] warfarin (COUMADIN) tablet 3 mg  3 mg Oral QPM  
 warfarin (COUMADIN) tablet 4 mg  4 mg Oral ONCE  
 busPIRone (BUSPAR) tablet 10 mg  10 mg Oral TID  ALPRAZolam (XANAX) tablet 0.5 mg  0.5 mg Oral QID PRN  
 midodrine (PROAMITINE) tablet 5 mg  5 mg Oral TID WITH MEALS  sodium chloride (NS) flush 5-40 mL  5-40 mL IntraVENous Q8H  
 0.9% sodium chloride infusion  25 mL/hr IntraVENous CONTINUOUS  
 sodium hypochlorite (QUARTER STRENGTH DAKIN'S) 0.125% irrigation (bottle)   Topical DAILY  loperamide (IMODIUM) capsule 4 mg  4 mg Oral QID PRN  
 albuterol (PROVENTIL VENTOLIN) nebulizer solution 2.5 mg  2.5 mg Nebulization Q6H RT  
 sodium chloride 3% hypertonic nebulizer soln  4 mL Nebulization BID RT  
 pantothenic ac-min oil-pet,hyd (AQUAPHOR) 41 % ointment   Topical DAILY  insulin glargine (LANTUS) injection 18 Units  18 Units SubCUTAneous QHS  insulin glargine (LANTUS) injection 28 Units  28 Units SubCUTAneous DAILY  mirtazapine (REMERON) tablet 15 mg  15 mg Oral QHS  traMADol (ULTRAM) tablet 50 mg  50 mg Oral Q6H PRN  
 sodium chloride (NS) flush 5-40 mL  5-40 mL IntraVENous Q8H  
 sodium chloride (NS) flush 5-40 mL  5-40 mL IntraVENous PRN  
 acetaminophen (TYLENOL) tablet 650 mg  650 mg Oral Q4H PRN  
 naloxone (NARCAN) injection 0.4 mg  0.4 mg IntraVENous PRN  
 ondansetron (ZOFRAN) injection 4 mg  4 mg IntraVENous Q4H PRN  
 bisacodyL (DULCOLAX) tablet 5 mg  5 mg Oral DAILY PRN  
 insulin regular (NOVOLIN R, HUMULIN R) injection   SubCUTAneous AC&HS Review of Systems 
+dizziness 
+fatigue Constitutional: negative for fever, chills, sweats Cardiovascular: negative for chest pain, palpitations, syncope, edema Gastrointestinal:  negative for dysphagia, reflux, vomiting, diarrhea, abdominal pain, or melena Neurologic:  negative for focal weakness, numbness, headache Objective:  
 
Vitals:  
 02/21/20 0040 02/21/20 0440 02/21/20 0730 02/21/20 7979 BP: 111/57 121/57  104/57 Pulse: 84 87  86 Resp: 18 20  18 Temp: 97 °F (36.1 °C) 97 °F (36.1 °C)  97 °F (36.1 °C) SpO2: 96% 93% 91% 100% Weight:  248 lb 3.2 oz (112.6 kg) Height:      
 
 
 
Intake/Output Summary (Last 24 hours) at 2/21/2020 1304 Last data filed at 2/21/2020 1146 Gross per 24 hour Intake 120 ml Output 651 ml Net -531 ml Physical Exam:  
Constitution:  the patient is well developed and in no acute distress, on 4L O2 
EENMT:  Sclera clear, pupils equal, oral mucosa moist 
Respiratory: distant breath sounds anteriorly Cardiovascular:  RRR without M,G,R 
Gastrointestinal: soft and non-tender; with positive bowel sounds. Musculoskeletal: warm without cyanosis. There is no lower extremity edema. Skin:  no jaundice or rashes Neurologic: no gross neuro deficits Psychiatric:  alert and oriented x 3 CXR:  
 
 
LAB Recent Labs  
  02/21/20 
1135 02/21/20 
0617 02/20/20 
2123 02/20/20 
1940 02/20/20 
1640 GLUCPOC 342* 149* 283* 276* 204* Recent Labs  
  02/21/20 
0415 02/20/20 
0539 02/19/20 
0530 WBC 8.6 8.1 7.7 HGB 8.6* 8.8* 8.7* HCT 27.4* 28.6* 27.6*  
 194 174 INR 1.9 1.9 2.5 Recent Labs  
  02/21/20 
0415 02/20/20 
0539 02/19/20 
0530  138 137  
K 4.1 4.2 4.1  105 102 CO2 28 28 28 * 93 116* BUN 42* 48* 35* CREA 3.53* 3.56* 3.40* CA 7.7* 7.8* 7.5* ALB  --  1.7*  --   
TBILI  --  0.4  --   
ALT  --  36  --   
SGOT  --  38*  -- No results for input(s): PH, PCO2, PO2, HCO3, PHI, PCO2I, PO2I, HCO3I in the last 72 hours. No results for input(s): LCAD, LAC in the last 72 hours. Assessment:  (Medical Decision Making) Hospital Problems  Date Reviewed: 2/21/2020 Codes Class Noted POA Hypotension ICD-10-CM: I95.9 ICD-9-CM: 458.9  2/18/2020 Yes Resolving Acute-on-chronic kidney injury ([facility] [address] [address] ICD-10-CM: N17.9, N18.9 ICD-9-CM: 584.9, 585.9  2/17/2020 Yes Nephrology following Atelectasis ICD-10-CM: J98.11 ICD-9-CM: 518.0  2/17/2020 Yes Monitor Fluid overload ICD-10-CM: E87.70 ICD-9-CM: 276.69  2/15/2020 Yes  
 diurese HCAP (healthcare-associated pneumonia) ICD-10-CM: J18.9 ICD-9-CM: 773  2/14/2020 Yes  
 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 
ICD-9-CM: 427.31  1/13/2020 Yes CAD (coronary artery disease) (Chronic) ICD-10-CM: I25.10 ICD-9-CM: 414.00  1/10/2020 Yes * (Principal) Acute hypoxemic respiratory failure ([facility] ICD-10-CM: J96.01 
ICD-9-CM: 518.81  1/10/2020 Yes S/P CABG x 3 (Chronic) ICD-10-CM: Z95.1 ICD-9-CM: V45.81  1/10/2020 Yes S/P AVR (Chronic) ICD-10-CM: Z95.2 ICD-9-CM: V43.3  1/10/2020 Yes DM type 2 (diabetes mellitus, type 2) (HCC) (Chronic) ICD-10-CM: E11.9 ICD-9-CM: 250.00  1/14/2013 Yes HTN (hypertension) (Chronic) ICD-10-CM: I10 
ICD-9-CM: 401.9  1/14/2013 Yes HLD (hyperlipidemia) (Chronic) ICD-10-CM: A30.8 ICD-9-CM: 272.4  1/14/2013 Yes Plan:  (Medical Decision Making)  
 
--wean O2 as tolerated 
--continue nebs 
--completed 7 days abx 
--continue PT/OT 
--we will not see over the weekend unless called More than 50% of the time documented was spent in face-to-face contact with the patient and in the care of the patient on the floor/unit where the patient is located. SURENDRA Daniel Se Lungs:  Diminished Heart:  RRR with no Murmur/Rubs/Gallops Additional Comments:  Wean 02, will check back on Monday I have spoken with and examined the patient. I agree with the above assessment and plan as documented.  
 
Litzy Tracy MD

## 2020-02-21 NOTE — PROGRESS NOTES
Reviewing plan for dressing changes made patient aware that coccyx, left leg and feet dressings need to be done. Patient became upset and stated that they were already done today - advised that the left leg needed to be changed twice a day. Verbalized understanding and stated that we could do it later this evening

## 2020-02-21 NOTE — PROGRESS NOTES
Problem: Self Care Deficits Care Plan (Adult) Goal: *Acute Goals and Plan of Care (Insert Text) Description 1. Patient will complete lower body bathing and dressing with Mod I and adaptive equipment as needed. 2. Patient will complete toileting with Mod I and adaptive equipment as needed. 3. Patient will tolerate 23 minutes of OT treatment with 2 rest breaks to increase activity tolerance for ADLs. 4. Patient will complete functional transfers with Mod I and adaptive equipment as needed. 5. Patient will complete functional transfer to sink to perform grooming with Mod I and adaptive equipment as needed. 6. Patient will perform pursed-lip breathing with one verbal and one visual cue to increase activity tolerance for performance of ADLs. Timeframe: 7 visits Outcome: Progressing Towards Goal 
 
OCCUPATIONAL THERAPY: Daily Note and PM 2/21/2020 INPATIENT: OT Visit Days: 2 Payor: Anabel Jackson / Plan: Kip Killer / Product Type: Managed Care Medicare /  
  
NAME/AGE/GENDER: Vane Mak is a 69 y.o. male PRIMARY DIAGNOSIS:  Acute respiratory failure ([facility] [J96.00] Fluid overload [E87.70] Acute hypoxemic respiratory failure (HCC) Acute hypoxemic respiratory failure (HCC) Procedure(s) (LRB): BRONCHOSCOPY (N/A) BRONCHIAL ALVEOLAR LAVAGE (N/A) 4 Days Post-Op ICD-10: Treatment Diagnosis:  
 · Generalized Muscle Weakness (M62.81) · Difficulty in walking, Not elsewhere classified (R26.2) Precautions/Allergies: 
   Heparin; Amoxicillin; Keflex [cephalexin]; and Pcn [penicillins] ASSESSMENT:  
 
Mr. Ranjana Casanova  is a 69 y.o. male admitted for Acute respiratory failure. At baseline, patient lives with wife in one story home with 2 ZACHARIAH. Wife home and available 24/7 if needed. Pt is typically Mod I to independent for performance of ADLs and IADLs. Pt typically uses no DME for in home or community mobility, but does have [facility] available if needed.  Reports that he is able to walk approximately 200 feet without any DME before having to stop to take a rest break due to becoming SOB. Pt reports no prior fall history. Upon arrival, pt supine in bed breathing 4L O2 with telemetry monitor in place. Pt alert and very impulsive throughout session. Per medical chart review, pt fell last night when using bedside commode. Reports no pain prior to or following functional mobility. Pt SBA when performing functional transfer from supine to seated edge of bed. Seated edge of bed, pt reports increased dizziness and does not understand why this keeps occurring. Pt CGA with use of RW to perform functional transfer from sit to stand. Standing static and dynamic balance fair. Pt CGA with use of RW to perform functional transfer from bed to [facility]. Requires moderate verbal and visual cueing for increased safety when performing transfer (keeping walker close to body and moving walker with him). Pt takes multiple attempts to wipe and requires Mod A for wiping and management of gown to perform toileting. Pt CGA with use of RW to return from [facility] to bed. Pt returned to supine position with all needs met and within reach at this time. RN notified. Jenise Villalobos is currently functioning below baseline for ADLs and functional mobility and would benefit from acute OT to increase independence and safety with ADLs and functional mobility. Will follow for duration of hospital stay to address the above goals. Patient was educated on role and plan of care of occupational therapy. This section established at most recent assessment PROBLEM LIST (Impairments causing functional limitations): 1. Decreased Strength 2. Decreased ADL/Functional Activities 3. Decreased Transfer Abilities 4. Decreased Ambulation Ability/Technique 5. Decreased Balance 6. Increased Pain 7. Decreased Activity Tolerance 8. Decreased Pacing Skills 9. Increased Fatigue 10. Increased Shortness of Breath 11. Decreased Skin Integrity/Hygeine INTERVENTIONS PLANNED: (Benefits and precautions of occupational therapy have been discussed with the patient.) 1. Activities of daily living training 2. Adaptive equipment training 3. Balance training 4. Clothing management 5. Hygiene training 6. Neuromuscular re-eduation 7. Re-evaluation 8. Therapeutic activity 9. Therapeutic exercise TREATMENT PLAN: Frequency/Duration: Follow patient 3x/week to address above goals. Rehabilitation Potential For Stated Goals: Fair REHAB RECOMMENDATIONS (at time of discharge pending progress):   
Placement: It is my opinion, based on this patient's performance to date, that Mr. Genie Clark may benefit from intensive therapy at a [address] [place] [address] after discharge due to the functional deficits listed above that are likely to improve with skilled rehabilitation and concerns that he/she may be unsafe to be unsupervised at home due to decreased independence, safety, and activity tolerance. .  
Equipment: ? TBD   
    
 
 
 
OCCUPATIONAL PROFILE AND HISTORY:  
History of Present Injury/Illness (Reason for Referral): 
See H & P Past Medical History/Comorbidities:  
Mr. Genie Clark  has a past medical history of Arthritis, BPH (benign prostatic hyperplasia) (1/14/2013), CAD (coronary artery disease), DM type 2 (diabetes mellitus, type 2) ([facility] [address] (dx 2004), Dyspnea, Gout (1/14/2013), HLD (hyperlipidemia) (1/14/2013), HTN (hypertension), Morbid obesity ([facility] [address] (9/3/14), Psychiatric disorder, Rheumatic fever, Seasonal allergic rhinitis, Severe aortic valve stenosis, Thyroid disease, and Unspecified sleep apnea (2016). Mr. Genie Clark  has a past surgical history that includes hx tonsil and adenoidectomy; hx heart catheterization (12/23/2019); hx orthopaedic (Left); hx cataract removal (Bilateral, 2012); and ir insert tunl cvc w port over 5 years (2/4/2020). Social History/Living Environment:  
Home Environment: Private residence # Steps to Enter: 2 Rails to Enter: No 
One/Two Story Residence: One story Living Alone: No 
Support Systems: Spouse/Significant Other/Partner Patient Expects to be Discharged to[de-identified] Rehabilitation facility Current DME Used/Available at Home: Cane, straight, Walker, rolling Tub or Shower Type: Shower Prior Level of Function/Work/Activity: At baseline, patient lives with wife in one story home with 2 ZACHARIAH. Wife home and available 24/7 if needed. Pt is typically Mod I to independent for performance of ADLs and IADLs. Pt typically uses no DME for in home or community mobility, but does have [facility] available if needed. Reports that he is able to walk approximately 200 feet without any DME before having to stop to take a rest break due to becoming SOB. Pt reports no prior fall history. Personal Factors:   
      Sex:  male Age:  69 y.o. Number of Personal Factors/Comorbidities that affect the Plan of Care: Extensive review of physical, cognitive, and psychosocial performance (3+):  HIGH COMPLEXITY ASSESSMENT OF OCCUPATIONAL PERFORMANCE[de-identified]  
Activities of Daily Living:  
Basic ADLs (From Assessment) Complex ADLs (From Assessment) Feeding: Minimum assistance Oral Facial Hygiene/Grooming: Minimum assistance Bathing: Moderate assistance Upper Body Dressing: Minimum assistance Lower Body Dressing: Moderate assistance Toileting: Moderate assistance Instrumental ADL Meal Preparation: Total assistance Homemaking: Total assistance Grooming/Bathing/Dressing Activities of Daily Living Toileting Toileting Assistance: Moderate assistance Functional Transfers Toilet Transfer : Contact guard assistance Bed/Mat Mobility Sit to Stand: Contact guard assistance Stand to Sit: Contact guard assistance Bed to Chair: Contact guard assistance Most Recent Physical Functioning:  
Gross Assessment: 
AROM: Within functional limits Strength: Generally decreased, functional 
 Coordination: Grossly decreased, non-functional 
         
  
Posture: 
Posture (WDL): Exceptions to [facility] Posture Assessment: Forward head, Rounded shoulders Balance: 
Sitting: Intact Standing - Static: Good Standing - Dynamic : Fair Bed Mobility: 
  
Wheelchair Mobility: 
  
Transfers: 
Sit to Stand: Contact guard assistance Stand to Sit: Contact guard assistance Bed to Chair: Contact guard assistance Interventions: Safety awareness training;Verbal cues Patient Vitals for the past 6 hrs: 
 BP BP Patient Position SpO2 O2 Flow Rate (L/min) Pulse 20 1257 122/57 Head of bed elevated (Comment degrees) 97 %  88  
20 1339   99 % 4 l/min  Mental Status Neurologic State: Alert, Irritable Orientation Level: Oriented X4 Cognition: Follows commands Perception: Appears intact Perseveration: No perseveration noted Safety/Judgement: Awareness of environment, Fall prevention, Lack of insight into deficits Physical Skills Involved: 
1. Balance 2. Strength 3. Activity Tolerance 4. Fine Motor Control 5. Vision 6. Pain (acute) 7. Skin Integrity Cognitive Skills Affected (resulting in the inability to perform in a timely and safe manner): 1. Sustained Attention 2. Divided Attention Psychosocial Skills Affected: 1. Habits/Routines 2. Environmental Adaptation 3. Social Interaction Number of elements that affect the Plan of Care: 5+:  HIGH COMPLEXITY CLINICAL DECISION MAKIN [facility] [address] [address] AM-PAC 6 Clicks Daily Activity Inpatient Short Form How much help from another person does the patient currently need. .. Total A Lot A Little None 1. Putting on and taking off regular lower body clothing? [] 1   [x] 2   [] 3   [] 4  
2. Bathing (including washing, rinsing, drying)? [] 1   [x] 2   [] 3   [] 4  
3.   Toileting, which includes using toilet, bedpan or urinal?   [] 1   [x] 2   [] 3   [] 4  
 4.  Putting on and taking off regular upper body clothing? [] 1   [] 2   [x] 3   [] 4  
5. Taking care of personal grooming such as brushing teeth? [] 1   [] 2   [x] 3   [] 4  
6. Eating meals? [] 1   [] 2   [x] 3   [] 4  
© 2007, Trustees of [address] [place] [address] [address] [address], under license to [employer]. All rights reserved Score:  Initial: 15, completed, 2/15/2020 Most Recent: X (Date: -- ) Interpretation of Tool:  Represents activities that are increasingly more difficult (i.e. Bed mobility, Transfers, Gait). Medical Necessity:    
· Skilled intervention continues to be required due to decreased independence, safety, and activity tolerance. In addition, pt having increased dizziness that is limiting pt ability to perform ADLs and functional mobility. Erwin Burkett Reason for Services/Other Comments: 
· Patient continues to require skilled intervention due to medical complications and patient unable to attend/participate in therapy as expected. Use of outcome tool(s) and clinical judgement create a POC that gives a: MODERATE COMPLEXITY  
 
 
 
TREATMENT:  
(In addition to Assessment/Re-Assessment sessions the following treatments were rendered) Pre-treatment Symptoms/Complaints: \"This is something new that's been happening since I've been short of breath. \" In regards to pt having dizzy spells. Pain: Initial:  
Pain Intensity 1:0 Post Session:  Unchanged Self Care: (25 minutes): Procedure(s) utilized to improve and/or restore self-care/home management as related to toileting. Required moderate visual and verbal cueing to facilitate activities of daily living skills and compensatory activities. Pt CGA with use of RW to perform functional transfer from bed to [facility]. Requires moderate verbal and visual cueing for increased safety when performing transfer (keeping walker close to body and moving walker with him).  Pt requires multiple attempts to wipe, resulting in Mod A for wiping and management of gown to perform toileting. Braces/Orthotics/Lines/Etc:  
· Telemetry Monitor · O2 Device: Nasal cannula Treatment/Session Assessment:   
· Response to Treatment:  Pt very impulsive throughout session. · Interdisciplinary Collaboration:  
o Occupational Therapist 
o Registered Nurse · After treatment position/precautions:  
o Supine in bed 
o Bed alarm/tab alert on 
o Bed/Chair-wheels locked 
o Bed in low position 
o Call light within reach 
o RN notified · Compliance with Program/Exercises: Compliant most of the time, Will assess as treatment progresses. · Recommendations/Intent for next treatment session: \"Next visit will focus on advancements to more challenging activities and reduction in assistance provided\". Total Treatment Duration: OT Patient Time In/Time Out Time In: 6945 Time Out: 1620 Yoselin Chaudhary

## 2020-02-21 NOTE — PROGRESS NOTES
Shift change, with bedside reporting completed, received verbal report from Tomasa Elizabeth RN. Reinforced Safety precautions: Call for assistance prior to activity, and use of nonskid socks before getting out of bed (OOB). Verbalized understanding of safety instructions. Hand held call-light within reach. Bed alarm on.

## 2020-02-21 NOTE — PROGRESS NOTES
[facility] Subjective:A on CKD   ( Probably ESRD has he has been on HD since 1/12/20 with no signs of recovery)  He fell yesterday. CT of head is neg. Some diarrhea yesterday Review of Systems -  
General ROS: negative for - fever, chills Respiratory ROS: no SOB, cough, ESTRELLA Cardiovascular ROS: no CP, palpitations Gastrointestinal ROS: no N&V, abdominal pain, diarrhea Genito-Urinary ROS: no difficulty voiding, dysuria Neurological ROS: no seizures, focal weekness Objective: 
 
Vitals:  
 02/21/20 0040 02/21/20 0440 02/21/20 0730 02/21/20 6581 BP: 111/57 121/57  104/57 Pulse: 84 87  86 Resp: 18 20  18 Temp: 97 °F (36.1 °C) 97 °F (36.1 °C)  97 °F (36.1 °C) SpO2: 96% 93% 91% 100% Weight:  112.6 kg (248 lb 3.2 oz) Height:      
 
 
PE 
Gen: in no acute distress CV:reg rate Chest:clear Abd: soft Ext/Access: left IJ tunneled HD cath ok [facility] LAB Recent Labs  
  02/21/20 
0415 02/20/20 
0539 02/19/20 
0530 WBC 8.6 8.1 7.7 HGB 8.6* 8.8* 8.7* HCT 27.4* 28.6* 27.6*  
 194 174 INR 1.9 1.9 2.5 Recent Labs  
  02/21/20 
0415 02/20/20 
0539 02/19/20 
0530  138 137  
K 4.1 4.2 4.1  105 102 CO2 28 28 28 * 93 116* BUN 42* 48* 35* CREA 3.53* 3.56* 3.40* CA 7.7* 7.8* 7.5* ALB  --  1.7*  --   
TBILI  --  0.4  --   
ALT  --  36  --   
SGOT  --  38*  --   
 
 
 
 
Radiology A/P:  
Patient Active Problem List  
Diagnosis Code  DM type 2 (diabetes mellitus, type 2) (HCC) E11.9  
 Gout M10.9  
 HTN (hypertension) I10  
 BPH (benign prostatic hyperplasia) N40.0  Seasonal allergic rhinitis J30.2  
 HLD (hyperlipidemia) E78.5  Nonrheumatic aortic valve stenosis I35.0  Obesity, morbid ([address] E66.01  
 Type 2 diabetes with nephropathy (HCC) E11.21  
 Bilateral carotid artery stenosis I65.23  
 Aortic valve stenosis I35.0  
 CAD (coronary artery disease) I25.10  Acute hypoxemic respiratory failure (HCC) J96.01  
 S/P CABG x 3 Z95.1  
 S/P AVR Z95.2  Atrial fibrillation (HCC) I48.91  
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  
 Pleural effusion J90  
 HCAP (healthcare-associated pneumonia) J18.9  Fluid overload E87.70  Acute-on-chronic kidney injury ([address] N17.9, N18.9  Atelectasis J98.11  
 Hypotension I95.9 Probably ESRD - for dialysis again tomorrow. ([place] [facility] schedule) Dyspnea HIT+ 
 
ASHD s/p CABG Orthostatic Hypotension - on midodrine Rudy Jhaveri MD

## 2020-02-21 NOTE — PROGRESS NOTES
Problem: Pressure Injury - Risk of 
Goal: *Prevention of pressure injury Description Document Alfredo Scale and appropriate interventions in the flowsheet. Outcome: Progressing Towards Goal 
Note: Pressure Injury Interventions: 
Sensory Interventions: Discuss PT/OT consult with provider, Assess need for specialty bed, Keep linens dry and wrinkle-free, Pressure redistribution bed/mattress (bed type) Moisture Interventions: Apply protective barrier, creams and emollients, Absorbent underpads Activity Interventions: Pressure redistribution bed/mattress(bed type), Increase time out of bed, PT/OT evaluation Mobility Interventions: PT/OT evaluation, Pressure redistribution bed/mattress (bed type), HOB 30 degrees or less, Assess need for specialty bed, Turn and reposition approx. every two hours(pillow and wedges) Nutrition Interventions: Document food/fluid/supplement intake Friction and Shear Interventions: Apply protective barrier, creams and emollients, Feet elevated on foot rest, Foam dressings/transparent film/skin sealants, HOB 30 degrees or less Problem: Breathing Pattern - Ineffective Goal: *Absence of hypoxia Outcome: Progressing Towards Goal 
  
Problem: Falls - Risk of 
Goal: *Absence of Falls Description Document Amparo Rangel Fall Risk and appropriate interventions in the flowsheet. Outcome: Not Progressing Towards Goal 
Note: Fall Risk Interventions: 
Mobility Interventions: Bed/chair exit alarm, Communicate number of staff needed for ambulation/transfer, Patient to call before getting OOB, PT Consult for mobility concerns, PT Consult for assist device competence Medication Interventions: Bed/chair exit alarm, Patient to call before getting OOB, Evaluate medications/consider consulting pharmacy, Teach patient to arise slowly Elimination Interventions: Bed/chair exit alarm, Call light in reach, Toileting schedule/hourly rounds, Patient to call for help with toileting needs History of Falls Interventions: Bed/chair exit alarm, Investigate reason for fall, Room close to nurse's station

## 2020-02-22 NOTE — PROGRESS NOTES
Patient complained that he had been trying to go to the bathroom but dialysis would  not let him go or use urinal.  Attempted to get patient out of bed while  sitting on side of bed patient started blinking and swaying back and forth . When I called his name patient quickly closed  his eyes. Instructed patient to return to bed will be using bed pan from this point on as  patient is not safe oob

## 2020-02-22 NOTE — PROGRESS NOTES
Problem: Patient Education: Go to Patient Education Activity Goal: Patient/Family Education Outcome: Progressing Towards Goal 
  
Problem: Falls - Risk of 
Goal: *Absence of Falls Description Document Edgar Brunner Fall Risk and appropriate interventions in the flowsheet. Outcome: Progressing Towards Goal 
Note: Fall Risk Interventions: 
Mobility Interventions: Patient to call before getting OOB Mentation Interventions: Bed/chair exit alarm Medication Interventions: Patient to call before getting OOB Elimination Interventions: Call light in reach History of Falls Interventions: Investigate reason for fall Problem: Patient Education: Go to Patient Education Activity Goal: Patient/Family Education Outcome: Progressing Towards Goal

## 2020-02-22 NOTE — PROGRESS NOTES
TRANSFER IN - DIALYSIS Received patient in dialysis unit  from [facility] (unit) for ordered procedure. Consent verified for renal replacement therapy. Patient alert and vital signs stable. /53 P83 Hemodialysis initiated using Catheter. Aspirated and flushed both ports without difficulty. Dressing clean, dry and intact. Machine settings per MD order. Heparin 0 unit bolus and 0 units/hr. Will monitor during treatment.

## 2020-02-22 NOTE — ROUTINE PROCESS
Verbal bedside report received from Caitlin Inks, [place]. Assumed care of patient.

## 2020-02-22 NOTE — PROGRESS NOTES
Problem: Pressure Injury - Risk of 
Goal: *Prevention of pressure injury Description Document Alfredo Scale and appropriate interventions in the flowsheet. Outcome: Progressing Towards Goal 
Note: Pressure Injury Interventions: 
Sensory Interventions: Float heels Moisture Interventions: Apply protective barrier, creams and emollients Activity Interventions: Increase time out of bed Mobility Interventions: HOB 30 degrees or less Nutrition Interventions: Document food/fluid/supplement intake Friction and Shear Interventions: Apply protective barrier, creams and emollients

## 2020-02-22 NOTE — PROGRESS NOTES
Warfarin dosing per pharmacist 
 
Rafia Diaz is a 69 y.o. male. Height: 5' 10\" (177.8 cm)    Weight: 112.3 kg (247 lb 9.6 oz) Indication:  AVR and afib Goal INR:  2.5 - 3.5 Home dose:  2.5 mg daily Risk factors or significant drug interactions:  Levofloxacin (2/13- 2/20 ), mirtazapine (started 2/13), escitalopram (started 2/19-2/20); amiodarone (dc'd 2/5) Other anticoagulants:  N/A Daily Monitoring Date  INR     Warfarin dose HGB              Notes 2/14  2.4  3 mg  8.7 2/15  2.2  4 mg  9.6 2/16  2.1  5 mg  9.8 2/17  2.1  5 mg  9.5 2/18  2.8  2 mg  9.3 2/19  2.5  2 mg  8.7 
2/20  1.9  3 mg   8.8 
2/21  1.9  4 mg  8.6 
2/22  2.1  3 mg  8.5 A/P:  levaquin and escitalopram stopped and patient's buspirone restarted. INR 2.1 today. Will give 3 mg dose today due to rapid INR increases on higher doses earlier in the admission. Consider dose changes based on future INR trend. Daily INRs. Pharmacy will continue to follow. Thank you, Rika Clark, PharmD, BCPS Clinical Pharmacy Specialist

## 2020-02-22 NOTE — ROUTINE PROCESS
Verbal bedside report given to vanesa Kendall RN. Patient's situation, background, assessment and recommendations provided. Opportunity for questions provided. Oncoming RN assumed care of patient.

## 2020-02-22 NOTE — PROGRESS NOTES
Hospitalist Progress Note Subjective:  
Daily Progress Note: 2/21/2020 1940 Acutely and chronically ill patient admitted 2/13 with sudden SOB after first dialysis. He was  Dialysis discharged 2/12 after CABG with aortic valve replacement, complicated by worsening CKD, now requiring HD, HIT, non healing wound left thigh with wound vac, pneumonia. Discharged on levaquin q 48 hours. He was also discharged on 2 liters of oxygen, increased to 4 on arrival at [facility] for unclear reasons. Chest CT in ER unchanged. Wound care and pulmonary consulted. Bronch 2/17 with findings of small effusion. Sacral wound still present on admission. Hypotensive. Begun on midodrine. Nephro on board.   
3/20; Dialysis today, tired afterward. Reports loose stools. Discussed antidepressants at length with patient and son. Patient has taken buspar for 20+ years, recently cut dose inadvertently per self from 10 mg tid to 5 mg bid. Was given correct dose on admission, then changed yesterday to [id number]. Am uncomfortable changing this med while hospitalized as unable to follow. Remains somewhat anxious, depressed due to setbacks. Will return to prescribed dose with prn xanax and advised to follow up with primary mental health provider on discharge. Legs remain edematous. No new complaints. Waiting for placement. Finished levaquin.  
 
2/21:  Glucose spiking pre meal.  Spoke with DM management, will add premeal low dose and titrate up. Changing regular insulin to humalog, hoping for tighter control. More depressed today as insurance denied further rehab days. Still functioning far below baseline. Will need intense home health. Son and POA notified of rejection per CM, very angry, states is phoning insurance today.   No further word as yet.   
  
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  
 busPIRone (BUSPAR) tablet 10 mg  10 mg Oral TID  ALPRAZolam (XANAX) tablet 0.5 mg  0.5 mg Oral QID PRN  
 midodrine (PROAMITINE) tablet 5 mg  5 mg Oral TID WITH MEALS  sodium chloride (NS) flush 5-40 mL  5-40 mL IntraVENous Q8H  
 0.9% sodium chloride infusion  25 mL/hr IntraVENous CONTINUOUS  
 sodium hypochlorite (QUARTER STRENGTH DAKIN'S) 0.125% irrigation (bottle)   Topical DAILY  loperamide (IMODIUM) capsule 4 mg  4 mg Oral QID PRN  
 albuterol (PROVENTIL VENTOLIN) nebulizer solution 2.5 mg  2.5 mg Nebulization Q6H RT  
 sodium chloride 3% hypertonic nebulizer soln  4 mL Nebulization BID RT  
 pantothenic ac-min oil-pet,hyd (AQUAPHOR) 41 % ointment   Topical DAILY  insulin glargine (LANTUS) injection 18 Units  18 Units SubCUTAneous QHS  insulin glargine (LANTUS) injection 28 Units  28 Units SubCUTAneous DAILY  mirtazapine (REMERON) tablet 15 mg  15 mg Oral QHS  traMADol (ULTRAM) tablet 50 mg  50 mg Oral Q6H PRN  
 sodium chloride (NS) flush 5-40 mL  5-40 mL IntraVENous Q8H  
 sodium chloride (NS) flush 5-40 mL  5-40 mL IntraVENous PRN  
 acetaminophen (TYLENOL) tablet 650 mg  650 mg Oral Q4H PRN  
 naloxone (NARCAN) injection 0.4 mg  0.4 mg IntraVENous PRN  
 ondansetron (ZOFRAN) injection 4 mg  4 mg IntraVENous Q4H PRN  
 bisacodyL (DULCOLAX) tablet 5 mg  5 mg Oral DAILY PRN  
 insulin regular (NOVOLIN R, HUMULIN R) injection   SubCUTAneous AC&HS Review of Systems A comprehensive review of systems was negative except for that written in the HPI. Objective:  
 
Visit Vitals /66 (BP 1 Location: Right arm, BP Patient Position: At rest) Pulse 77 Temp 97.9 °F (36.6 °C) Resp 16 Ht 5' 10\" (1.778 m) Wt 112.3 kg (247 lb 9.6 oz) SpO2 95% BMI 35.53 kg/m² O2 Flow Rate (L/min): 4 l/min O2 Device: Nasal cannula Temp (24hrs), Av.6 °F (36.4 °C), Min:97 °F (36.1 °C), Max:98 °F (36.7 °C) 
 
1901 -  07 In: -  
Out: 125 [Urine:125] 701 - 1900 In: 120 [P.O.:120] Out: 3884 [Urine:650] General appearance: Oriented and alert, cooperative, obese. Upset and anxious regarding insurance rejection for rehab. States he doesn't know how he will cope at home. Functionality remains far below baseline. No family in room at the time of rounds. Head: Normocephalic, without obvious abnormality, atraumatic Eyes: conjunctivae/corneas clear. PERRL Throat: Lips, mucosa, and tongue normal. Teeth and gums normal 
Neck: supple, symmetrical, trachea midline, and no JVD Lungs: Scattered crackles, otherwise clear to auscultation bilaterally Heart: regular rate and rhythm, S1, S2 normal, no murmur, click, rub or gallop Abdomen: soft, non-tender. Bowel sounds normal. No masses,  no organomegaly Extremities: extremities normal, atraumatic, no cyanosis or edema Skin: Skin color, texture, turgor normal. No rashes or lesions Neurologic: Grossly normal 
  
Additional comments: Notes,orders, test results, vitals reviewed Data Review Ref. Range 2020 04:15 WBC Latest Ref Range: 4.3 - 11.1 K/uL 8.6 RBC Latest Ref Range: 4.23 - 5.6 M/uL 2.93 (L) HGB Latest Ref Range: 13.6 - 17.2 g/dL 8.6 (L) HCT Latest Ref Range: 41.1 - 50.3 % 27.4 (L) MCV Latest Ref Range: 79.6 - 97.8 FL 93.5 MCH Latest Ref Range: 26.1 - 32.9 PG 29.4 MCHC Latest Ref Range: 31.4 - 35.0 g/dL 31.4 RDW Latest Ref Range: 11.9 - 14.6 % 13.7 PLATELET Latest Ref Range: 150 - 450 K/uL 204 MPV Latest Ref Range: 9.4 - 12.3 FL 10.1 ABSOLUTE NRBC Latest Ref Range: 0.0 - 0.2 K/uL 0.00 INR Latest Units:   1.9 Prothrombin time Latest Ref Range: 12.0 - 14.7 sec 22.6 (H) Sodium Latest Ref Range: 136 - 145 mmol/L 139 Potassium Latest Ref Range: 3.5 - 5.1 mmol/L 4.1 Chloride Latest Ref Range: 98 - 107 mmol/L 102 CO2 Latest Ref Range: 21 - 32 mmol/L 28 Anion gap Latest Ref Range: 7 - 16 mmol/L 9 Glucose Latest Ref Range: 65 - 100 mg/dL 185 (H) BUN Latest Ref Range: 8 - 23 MG/DL 42 (H) Creatinine Latest Ref Range: 0.8 - 1.5 MG/DL 3.53 (H) Calcium Latest Ref Range: 8.3 - 10.4 MG/DL 7.7 (L) GFR est non-AA Latest Ref Range: >60 ml/min/1.73m2 18 (L) GFR est AA Latest Ref Range: >60 ml/min/1.73m2 22 (L)  
 
2/17:  CXR;  Unchanged left lung base atelectasis or infiltrate and small pleural effusion. 
  
2/13:  CT CHEST:  No evidence of pulmonary embolism. Left lower lobe atelectasis or pneumonia with a left pleural effusion. Linear atelectasis of the right middle and right upper lobes. Dilated diffuse fluid-filled esophagus. No evidence of a hiatal hernia or obstructing mass. I question of the patient could have gastroesophageal reflux disease. Coronary artery disease status post CABG. Assessment/Plan:  
Acute on chronic hypoxemic respiratory failure, HCAP Finished levaquin No PE, see CT above Pulmonary on board Weaning oxygen, down to 3 liters Flutter valve 2/17:  Bronch with no obstruction Left Pleural effusion too small to tap  
  
DVT: 
            Continue coumadin with target INR 2.5 
            HIT + last admission  
  
IDDM type 2:  A1C: 7.6 Continue SSI lantus bid Adding 4 units Humalog ac tid to titrate  up  
  Changing regular insulin to humalog Hypotension with baseline HTN Continue midodrine Holding all antihypertensives 
  
HLD: Home meds 
  
CAD with 3 vessel CABG and AVR 1/10/20 Continue coumadin and post op orders.  
  
Chronic Atrial fibrillation:  Continue meds 
  
Fluid overload Dialysis  
  
ESRD on new HD Dialysis tomorrow Nephrology on board 2/21:  Insurance denied rehab Will have to go home when appropriate with very close home health and follow up appointments, OP dialysis if indicated Care Plan discussed with: Patient, CM, Dr Neeru Garcia and Nurse Signed By: Homa Hodge NP February 21, 2020

## 2020-02-22 NOTE — PROGRESS NOTES
[facility] Subjective:A on CKD   ( Probably ESRD has he has been on HD since 1/12/20 with no signs of recovery) S: Seen and examined on HD. Goal [id number]. Complains of a swollen prostate. Review of Systems -  
General ROS: negative for - fever, chills Respiratory ROS: no SOB, cough, ESTRELLA Cardiovascular ROS: no CP, palpitations Gastrointestinal ROS: no N&V, abdominal pain, diarrhea Genito-Urinary ROS: no difficulty voiding, dysuria Neurological ROS: no seizures, focal weekness Objective: 
 
Vitals:  
 02/22/20 4193 02/22/20 5118 02/22/20 0902 02/22/20 0344 BP: 147/67 117/53 125/72 114/55 Pulse: 84 83 83 84 Resp: 16 Temp: 97 °F (36.1 °C) SpO2: 93% Weight:      
Height:      
 
 
PE 
Gen: in no acute distress CV:reg rate Chest:clear Abd: soft Ext/Access: left IJ tunneled HD cath victoriano Donato [facility] LAB Recent Labs  
  02/22/20 0458 02/21/20 
0415 02/20/20 
0539 WBC 7.3 8.6 8.1 HGB 8.5* 8.6* 8.8* HCT 26.7* 27.4* 28.6*  
 204 194 INR 2.1 1.9 1.9 Recent Labs  
  02/22/20 0458 02/21/20 
0415 02/20/20 
0539  139 138  
K 4.2 4.1 4.2  102 105 CO2 30 28 28 * 185* 93 BUN 54* 42* 48* CREA 3.83* 3.53* 3.56* PHOS 4.4*  --   --   
CA 7.9* 7.7* 7.8* ALB 1.8*  --  1.7* TBILI  --   --  0.4 ALT  --   --  36 SGOT  --   --  38* Radiology A/P:  
Patient Active Problem List  
Diagnosis Code  DM type 2 (diabetes mellitus, type 2) (HCC) E11.9  
 Gout M10.9  
 HTN (hypertension) I10  
 BPH (benign prostatic hyperplasia) N40.0  Seasonal allergic rhinitis J30.2  
 HLD (hyperlipidemia) E78.5  Nonrheumatic aortic valve stenosis I35.0  Obesity, morbid ([address] E66.01  
 Type 2 diabetes with nephropathy (HCC) E11.21  
 Bilateral carotid artery stenosis I65.23  
 Aortic valve stenosis I35.0  
 CAD (coronary artery disease) I25.10  Acute hypoxemic respiratory failure ([facility]) J96.01  
  S/P CABG x 3 Z95.1  
 S/P AVR Z95.2  Atrial fibrillation (HCC) I48.91  
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  
 Pleural effusion J90  
 HCAP (healthcare-associated pneumonia) J18.9  Fluid overload E87.70  Acute-on-chronic kidney injury ([facility] [address] N17.9, N18.9  Atelectasis J98.11  
 Hypotension I95.9 ? Probably ESRD-  ([place] [facility] schedule) -HD today. -Minimal rate of rise pre-HD today. Monitor over weekend for signs of recovery and if none, then likely can be declared ESRD (HD dependent since 1/12) Dyspnea HIT+ 
 
ASHD s/p CABG Orthostatic Hypotension - on midodrine +He says his prostate is swollen because he isn't on Flomax. I do not see this on any of his MedRec.  Will defer to primary team. 
 
 
 
 
Georges Barboza, NP

## 2020-02-22 NOTE — PROGRESS NOTES
Hospitalist Progress Note Subjective:  
 
Patient was seen at bedside. Very deconditioned. Had several complications after a CABG in January 2020. On HD due to post surgical DEJUAN. Blood sugar is better controlled today. Current Facility-Administered Medications Medication Dose Route Frequency  insulin lispro (HUMALOG) injection   SubCUTAneous AC&HS  insulin lispro (HUMALOG) injection 4 Units  4 Units SubCUTAneous TIDAC  tamsulosin (FLOMAX) capsule 0.4 mg  0.4 mg Oral DAILY  warfarin (COUMADIN) tablet 3 mg  3 mg Oral QPM  
 busPIRone (BUSPAR) tablet 10 mg  10 mg Oral TID  ALPRAZolam (XANAX) tablet 0.5 mg  0.5 mg Oral QID PRN  
 midodrine (PROAMITINE) tablet 5 mg  5 mg Oral TID WITH MEALS  sodium chloride (NS) flush 5-40 mL  5-40 mL IntraVENous Q8H  
 sodium hypochlorite (QUARTER STRENGTH DAKIN'S) 0.125% irrigation (bottle)   Topical DAILY  loperamide (IMODIUM) capsule 4 mg  4 mg Oral QID PRN  
 albuterol (PROVENTIL VENTOLIN) nebulizer solution 2.5 mg  2.5 mg Nebulization Q6H RT  
 sodium chloride 3% hypertonic nebulizer soln  4 mL Nebulization BID RT  
 pantothenic ac-min oil-pet,hyd (AQUAPHOR) 41 % ointment   Topical DAILY  insulin glargine (LANTUS) injection 18 Units  18 Units SubCUTAneous QHS  insulin glargine (LANTUS) injection 28 Units  28 Units SubCUTAneous DAILY  mirtazapine (REMERON) tablet 15 mg  15 mg Oral QHS  traMADol (ULTRAM) tablet 50 mg  50 mg Oral Q6H PRN  
 sodium chloride (NS) flush 5-40 mL  5-40 mL IntraVENous Q8H  
 sodium chloride (NS) flush 5-40 mL  5-40 mL IntraVENous PRN  
 acetaminophen (TYLENOL) tablet 650 mg  650 mg Oral Q4H PRN  
 naloxone (NARCAN) injection 0.4 mg  0.4 mg IntraVENous PRN  
 ondansetron (ZOFRAN) injection 4 mg  4 mg IntraVENous Q4H PRN  
 bisacodyL (DULCOLAX) tablet 5 mg  5 mg Oral DAILY PRN Review of Systems A comprehensive review of systems was negative except for that written in the HPI. Objective: Visit Vitals /55 Pulse 91 Temp 97.2 °F (36.2 °C) Resp 18 Ht 5' 10\" (1.778 m) Wt 112.3 kg (247 lb 9.6 oz) SpO2 93% BMI 35.53 kg/m² O2 Flow Rate (L/min): 2 l/min O2 Device: Nasal cannula Temp (24hrs), Av.5 °F (36.4 °C), Min:97 °F (36.1 °C), Max:97.9 °F (36.6 °C) 
 
701 - 1900 In: 357 [P.O.:357] Out:  [Urine:500] 1901 -  07 In: -  
Out: 575 [Urine:575] General appearance: Oriented and alert, cooperative, obese. Head: Normocephalic, without obvious abnormality, atraumatic Eyes: conjunctivae/corneas clear. PERRL Throat: Lips, mucosa, and tongue normal. Teeth and gums normal 
Neck: supple, symmetrical, trachea midline, and no JVD Lungs: Scattered crackles, otherwise clear to auscultation bilaterally Heart: regular rate and rhythm, S1, S2 normal, no murmur, click, rub or gallop Abdomen: soft, non-tender. Bowel sounds normal. No masses,  no organomegaly Extremities: extremities normal, atraumatic, no cyanosis or edema Skin: Skin color, texture, turgor normal. No rashes or lesions Neurologic: Grossly normal 
  
Additional comments: Notes,orders, test results, vitals reviewed Data Review Ref. Range 2020 04:15 WBC Latest Ref Range: 4.3 - 11.1 K/uL 8.6 RBC Latest Ref Range: 4.23 - 5.6 M/uL 2.93 (L) HGB Latest Ref Range: 13.6 - 17.2 g/dL 8.6 (L) HCT Latest Ref Range: 41.1 - 50.3 % 27.4 (L) MCV Latest Ref Range: 79.6 - 97.8 FL 93.5 MCH Latest Ref Range: 26.1 - 32.9 PG 29.4 MCHC Latest Ref Range: 31.4 - 35.0 g/dL 31.4 RDW Latest Ref Range: 11.9 - 14.6 % 13.7 PLATELET Latest Ref Range: 150 - 450 K/uL 204 MPV Latest Ref Range: 9.4 - 12.3 FL 10.1 ABSOLUTE NRBC Latest Ref Range: 0.0 - 0.2 K/uL 0.00 INR Latest Units:   1.9 Prothrombin time Latest Ref Range: 12.0 - 14.7 sec 22.6 (H) Sodium Latest Ref Range: 136 - 145 mmol/L 139 Potassium Latest Ref Range: 3.5 - 5.1 mmol/L 4.1 Chloride Latest Ref Range: 98 - 107 mmol/L 102 CO2 Latest Ref Range: 21 - 32 mmol/L 28 Anion gap Latest Ref Range: 7 - 16 mmol/L 9 Glucose Latest Ref Range: 65 - 100 mg/dL 185 (H) BUN Latest Ref Range: 8 - 23 MG/DL 42 (H) Creatinine Latest Ref Range: 0.8 - 1.5 MG/DL 3.53 (H) Calcium Latest Ref Range: 8.3 - 10.4 MG/DL 7.7 (L) GFR est non-AA Latest Ref Range: >60 ml/min/1.73m2 18 (L) GFR est AA Latest Ref Range: >60 ml/min/1.73m2 22 (L)  
 
2/17:  CXR;  Unchanged left lung base atelectasis or infiltrate and small pleural effusion. 
  
2/13:  CT CHEST:  No evidence of pulmonary embolism. Left lower lobe atelectasis or pneumonia with a left pleural effusion. Linear atelectasis of the right middle and right upper lobes. Dilated diffuse fluid-filled esophagus. No evidence of a hiatal hernia or obstructing mass. I question of the patient could have gastroesophageal reflux disease. Coronary artery disease status post CABG. Assessment/Plan:  
Acute on chronic hypoxemic respiratory failure, HCAP Finished levaquin Weaning oxygen Flutter valve 2/17:  Bronch with no obstruction Left Pleural effusion too small to tap  
  
DVT: 
            Continue coumadin with target INR 2.5 
            HIT + last admission  
  
IDDM type 2:  A1C: 7.6 Lantus bid Humalog 4 U tid before meals Hypotension with baseline HTN Continue midodrine Holding all antihypertensives 
  
HLD: Home meds 
  
CAD with 3 vessel CABG and AVR 1/10/20 Continue coumadin and post op orders.  
  
Chronic Atrial fibrillation:  Continue meds 
  
Fluid overload Dialysis  
  
ESRD on new HD 
            continue HD Nephrology on board Signed By: Eddie Billingsley MD   
 February 21, 2020

## 2020-02-22 NOTE — DIALYSIS
TRANSFER OUT- DIALYSIS Hemodialysis treatment completed without complications. Patient alert and VS stable  /58  P 90   
 
 1.5 Kgs removed. Flushed both ports with 10 mL of NS.  CVC dressing clean, dry, and intact, tego caps intact, bilateral lumens wrapped with 4x4 gauze. Meds given-None. No units of RBCs given during dialysis. Patient to 334 after dialysis.

## 2020-02-23 NOTE — PROGRESS NOTES
Dr. Susan Mcburney, pt in [phone] had an assisted fall around [phone]. Myself and two other nurses were assisting pt from bedside commode back to the bed (after a bowel movement). At the bedside, while attempting to get pt into bed, pt laid his head on one nurse and slowly went down to his knees. We had another 3 nurses come in to assist and after a few minutes we successfully got him back into bed. Vitals were taken, BP was 92/62, O2 was 95 without O2, pulse rate, at the highest rate was 109. Blood sugar was 227 prior to receiving any insulin. Spoke with Susan Mcburney, MD who informed me to ensure pt is on strict bedrest. MD also asked that I continue monitoring pt and inform him of any changes.

## 2020-02-23 NOTE — PROGRESS NOTES
[facility] Subjective:A on CKD   ( Probably ESRD has he has been on HD since 1/12/20 with no signs of recovery)  No new complaints. Review of Systems -  
General ROS: negative for - fever, chills Respiratory ROS: no SOB, cough, ESTRELLA Cardiovascular ROS: no CP, palpitations Gastrointestinal ROS: no N&V, abdominal pain, diarrhea Genito-Urinary ROS: no difficulty voiding, dysuria Neurological ROS: no seizures, focal weekness Objective: 
 
Vitals:  
 02/22/20 2237 02/23/20 1795 02/23/20 0533 02/23/20 5544 BP: 92/62 99/42 107/68 Pulse:  85 85 Resp:  20 18 Temp:  98.1 °F (36.7 °C) 98.5 °F (36.9 °C) SpO2: 95% 94% 96% 94% Weight:   109.5 kg (241 lb 4.8 oz) Height:      
 
 
PE 
Gen: in no acute distress CV:reg rate Chest:clear Abd: soft Ext/Access: left IJ tunneled HD cath ok Deanna Gavin LAB Recent Labs  
  02/23/20 
0435 02/22/20 
0458 02/21/20 
0415 WBC  --  7.3 8.6 HGB  --  8.5* 8.6* HCT  --  26.7* 27.4* PLT  --  210 204 INR 1.9 2.1 1.9 Recent Labs  
  02/23/20 
0426 02/22/20 
0458 02/21/20 
0415  139 139  
K 4.4 4.2 4.1  103 102 CO2 28 30 28 * 120* 185* BUN 33* 54* 42* CREA 2.93* 3.83* 3.53* PHOS  --  4.4*  --   
CA 7.8* 7.9* 7.7* ALB  --  1.8*  --   
 
 
 
 
Radiology A/P:  
Patient Active Problem List  
Diagnosis Code  DM type 2 (diabetes mellitus, type 2) (HCC) E11.9  
 Gout M10.9  
 HTN (hypertension) I10  
 BPH (benign prostatic hyperplasia) N40.0  Seasonal allergic rhinitis J30.2  
 HLD (hyperlipidemia) E78.5  Nonrheumatic aortic valve stenosis I35.0  Obesity, morbid ([address] E66.01  
 Type 2 diabetes with nephropathy (HCC) E11.21  
 Bilateral carotid artery stenosis I65.23  
 Aortic valve stenosis I35.0  
 CAD (coronary artery disease) I25.10  Acute hypoxemic respiratory failure ([facility]) J96.01  
 S/P CABG x 3 Z95.1  
 S/P AVR Z95.2  Atrial fibrillation ([facility] I48.91  
 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  
 Pleural effusion J90  
 HCAP (healthcare-associated pneumonia) J18.9  Fluid overload E87.70  Acute-on-chronic kidney injury ([facility] N17.9, N18.9  Atelectasis J98.11  
 Hypotension I95.9 Probably ESRD - for dialysis again on Tues 2/25/20 unless he shows signs of renal recovery. ([facility] schedule) Dyspnea HIT+ 
 
ASHD s/p CABG Orthostatic Hypotension - on midodrine Hermilo Shah MD

## 2020-02-23 NOTE — ROUTINE PROCESS
Verbal bedside report received from Kylie Guardado, [address] [facility]. Assumed care of patient.

## 2020-02-23 NOTE — ROUTINE PROCESS
Verbal bedside report given to vanesa Ghosh Chi, RN. Patient's situation, background, assessment and recommendations provided. Opportunity for questions provided. The assisted fall was explained in detail. Oncoming RN assumed care of patient.

## 2020-02-23 NOTE — PROGRESS NOTES
Informed by nursing patient with assisted fall. Patient reportedly gently lowered onto his knees by several nurses. No reported complaints at this time.  
 
Plan: 
-Strict bedrest

## 2020-02-23 NOTE — PROGRESS NOTES
Warfarin dosing per pharmacist 
 
Immanuel Gloria is a 69 y.o. male. Height: 5' 10\" (177.8 cm)    Weight: 109.5 kg (241 lb 4.8 oz) Indication:  AVR and afib Goal INR:  2.5 - 3.5 Home dose:  2.5 mg daily Risk factors or significant drug interactions:  Levofloxacin (2/13- 2/20 ), mirtazapine (started 2/13), escitalopram (started 2/19-2/20); amiodarone (dc'd 2/5) Other anticoagulants:  N/A Daily Monitoring Date  INR     Warfarin dose HGB              Notes 2/14  2.4  3 mg  8.7 2/15  2.2  4 mg  9.6 2/16  2.1  5 mg  9.8 2/17  2.1  5 mg  9.5 2/18  2.8  2 mg  9.3 2/19  2.5  2 mg  8.7 
2/20  1.9  3 mg   8.8 
2/21  1.9  4 mg  8.6 
2/22  2.1  3 mg  8.5 
2/23  1.9  4 mg  -- 
 
A/P:  levaquin and escitalopram stopped and patient's buspirone restarted. INR 1.9 today. Increase to 4 mg this evening. Daily INRs. Pharmacy will continue to follow. Thank you, Alis Jackson, Pharm. D. Clinical Pharmacist 
[phone]

## 2020-02-23 NOTE — ROUTINE PROCESS
Verbal bedside report given to vanesa Santiago RN. Patient's situation, background, assessment and recommendations provided. Opportunity for questions provided. Oncoming RN assumed care of patient.

## 2020-02-23 NOTE — PROGRESS NOTES
Hospitalist Progress Note Subjective:  
 
Patient was seen at bedside. Very deconditioned. Had several complications after a CABG in January 2020. On HD due to post surgical DEJUAN. Dose of insulin has been titrated. Had a fall yesterday. No head trauma. Current Facility-Administered Medications Medication Dose Route Frequency  warfarin (COUMADIN) tablet 4 mg  4 mg Oral QPM  
 insulin lispro (HUMALOG) injection 6 Units  6 Units SubCUTAneous TIDAC  
 [START ON 2/24/2020] insulin glargine (LANTUS) injection 30 Units  30 Units SubCUTAneous DAILY  insulin glargine (LANTUS) injection 20 Units  20 Units SubCUTAneous QHS  insulin lispro (HUMALOG) injection   SubCUTAneous AC&HS  tamsulosin (FLOMAX) capsule 0.4 mg  0.4 mg Oral QHS  busPIRone (BUSPAR) tablet 10 mg  10 mg Oral TID  ALPRAZolam (XANAX) tablet 0.5 mg  0.5 mg Oral QID PRN  
 midodrine (PROAMITINE) tablet 5 mg  5 mg Oral TID WITH MEALS  sodium chloride (NS) flush 5-40 mL  5-40 mL IntraVENous Q8H  
 sodium hypochlorite (QUARTER STRENGTH DAKIN'S) 0.125% irrigation (bottle)   Topical DAILY  loperamide (IMODIUM) capsule 4 mg  4 mg Oral QID PRN  
 albuterol (PROVENTIL VENTOLIN) nebulizer solution 2.5 mg  2.5 mg Nebulization Q6H RT  
 sodium chloride 3% hypertonic nebulizer soln  4 mL Nebulization BID RT  
 pantothenic ac-min oil-pet,hyd (AQUAPHOR) 41 % ointment   Topical DAILY  mirtazapine (REMERON) tablet 15 mg  15 mg Oral QHS  traMADol (ULTRAM) tablet 50 mg  50 mg Oral Q6H PRN  
 sodium chloride (NS) flush 5-40 mL  5-40 mL IntraVENous Q8H  
 sodium chloride (NS) flush 5-40 mL  5-40 mL IntraVENous PRN  
 acetaminophen (TYLENOL) tablet 650 mg  650 mg Oral Q4H PRN  
 naloxone (NARCAN) injection 0.4 mg  0.4 mg IntraVENous PRN  
 ondansetron (ZOFRAN) injection 4 mg  4 mg IntraVENous Q4H PRN  
 bisacodyL (DULCOLAX) tablet 5 mg  5 mg Oral DAILY PRN Review of Systems A comprehensive review of systems was negative except for that written in the HPI. Objective:  
 
Visit Vitals /41 (BP 1 Location: Left arm, BP Patient Position: At rest) Pulse 83 Temp 97.7 °F (36.5 °C) Resp 18 Ht 5' 10\" (1.778 m) Wt 109.5 kg (241 lb 4.8 oz) SpO2 92% BMI 34.62 kg/m² O2 Flow Rate (L/min): 0 l/min O2 Device: Room air Temp (24hrs), Av.8 °F (36.6 °C), Min:97.3 °F (36.3 °C), Max:98.5 °F (36.9 °C) No intake/output data recorded.  1901 -  0700 In: [address] Out: 1373 [Urine:775] General appearance: Oriented and alert, cooperative, obese. Head: Normocephalic, without obvious abnormality, atraumatic Eyes: conjunctivae/corneas clear. PERRL Throat: Lips, mucosa, and tongue normal. Teeth and gums normal 
Neck: supple, symmetrical, trachea midline, and no JVD Lungs: Scattered crackles, otherwise clear to auscultation bilaterally Heart: regular rate and rhythm, S1, S2 normal, no murmur, click, rub or gallop Abdomen: soft, non-tender. Bowel sounds normal. No masses,  no organomegaly Extremities: necrotic ischemia of the left big toe and second R toe Skin: Skin color, texture, turgor normal. No rashes or lesions Neurologic: Grossly normal 
  
Additional comments: Notes,orders, test results, vitals reviewed Data Review Ref. Range 2020 04:15 WBC Latest Ref Range: 4.3 - 11.1 K/uL 8.6 RBC Latest Ref Range: 4.23 - 5.6 M/uL 2.93 (L) HGB Latest Ref Range: 13.6 - 17.2 g/dL 8.6 (L) HCT Latest Ref Range: 41.1 - 50.3 % 27.4 (L) MCV Latest Ref Range: 79.6 - 97.8 FL 93.5 MCH Latest Ref Range: 26.1 - 32.9 PG 29.4 MCHC Latest Ref Range: 31.4 - 35.0 g/dL 31.4 RDW Latest Ref Range: 11.9 - 14.6 % 13.7 PLATELET Latest Ref Range: 150 - 450 K/uL 204 MPV Latest Ref Range: 9.4 - 12.3 FL 10.1 ABSOLUTE NRBC Latest Ref Range: 0.0 - 0.2 K/uL 0.00 INR Latest Units:   1.9 Prothrombin time Latest Ref Range: 12.0 - 14.7 sec 22.6 (H) Sodium Latest Ref Range: 136 - 145 mmol/L 139 Potassium Latest Ref Range: 3.5 - 5.1 mmol/L 4.1 Chloride Latest Ref Range: 98 - 107 mmol/L 102 CO2 Latest Ref Range: 21 - 32 mmol/L 28 Anion gap Latest Ref Range: 7 - 16 mmol/L 9 Glucose Latest Ref Range: 65 - 100 mg/dL 185 (H) BUN Latest Ref Range: 8 - 23 MG/DL 42 (H) Creatinine Latest Ref Range: 0.8 - 1.5 MG/DL 3.53 (H) Calcium Latest Ref Range: 8.3 - 10.4 MG/DL 7.7 (L) GFR est non-AA Latest Ref Range: >60 ml/min/1.73m2 18 (L) GFR est AA Latest Ref Range: >60 ml/min/1.73m2 22 (L)  
 
2/17:  CXR;  Unchanged left lung base atelectasis or infiltrate and small pleural effusion. 
  
2/13:  CT CHEST:  No evidence of pulmonary embolism. Left lower lobe atelectasis or pneumonia with a left pleural effusion. Linear atelectasis of the right middle and right upper lobes. Dilated diffuse fluid-filled esophagus. No evidence of a hiatal hernia or obstructing mass. I question of the patient could have gastroesophageal reflux disease. Coronary artery disease status post CABG. Assessment/Plan:  
Acute on chronic hypoxemic respiratory failure, HCAP Finished levaquin Weaning oxygen Flutter valve 2/17:  Bronch with no obstruction 
             
  
DVT: 
            Continue coumadin with target INR 2.5 
            HIT + last admission  
  
IDDM type 2:  A1C: 7.6 Lantus bid . Dose increased to 30-0-20 Humalog 6 U tid before meals Hypotension with baseline HTN Continue midodrine Holding all antihypertensives 
  
HLD: Home meds 
  
CAD with 3 vessel CABG and AVR 1/10/20 Continue coumadin and post op orders.  
  
Chronic Atrial fibrillation:  Continue meds 
  
Fluid overload Dialysis  
  
ESRD on new HD 
            continue HD Nephrology on board Signed By: Krystyna Melendrez MD   
 February 21, 2020

## 2020-02-24 NOTE — ROUTINE PROCESS
Verbal bedside report received from Sofia Garcia, [facility]. Assumed care of patient.

## 2020-02-24 NOTE — PROGRESS NOTES
Problem: Falls - Risk of 
Goal: *Absence of Falls Description Document Sugey Andrew Fall Risk and appropriate interventions in the flowsheet. Outcome: Progressing Towards Goal 
Note: Fall Risk Interventions: 
Mobility Interventions: Bed/chair exit alarm, Communicate number of staff needed for ambulation/transfer, Patient to call before getting OOB Mentation Interventions: Bed/chair exit alarm, Increase mobility, More frequent rounding Medication Interventions: Bed/chair exit alarm, Evaluate medications/consider consulting pharmacy, Patient to call before getting OOB, Teach patient to arise slowly Elimination Interventions: Bed/chair exit alarm, Call light in reach, Patient to call for help with toileting needs, Toileting schedule/hourly rounds History of Falls Interventions: Bed/chair exit alarm

## 2020-02-24 NOTE — PROGRESS NOTES
Care Management Interventions PCP Verified by CM: Yes Mode of Transport at Discharge: BLS(Hien White Spouse [phone] ) Transition of Care Consult (CM Consult): Discharge Planning, SNF Discharge Durable Medical Equipment: No 
Physical Therapy Consult: Yes Occupational Therapy Consult: Yes Speech Therapy Consult: No 
Current Support Network: [address] [place] [address] Confirm Follow Up Transport: Family The Plan for Transition of Care is Related to the Following Treatment Goals : SNF The Patient and/or Patient Representative was Provided with a Choice of Provider and Agrees with the Discharge Plan?: Yes Name of the Patient Representative Who was Provided with a Choice of Provider and Agrees with the Discharge Plan: Pt son Freedom of Choice List was Provided with Basic Dialogue that Supports the Patient's Individualized Plan of Care/Goals, Treatment Preferences and Shares the Quality Data Associated with the Providers?: Yes Discharge Location Discharge Placement: Rehab Unit Subacute Aminata denied rehab auth after peer to peer was completed on 2/21. DALIA spoke with pt son, requesting [facility] be resubmitted per his conversations with [employer]. DALIA spoke with Joseph Aguero, will resubmit auth for SNF. CM to continue to follow for dc needs.

## 2020-02-24 NOTE — PROGRESS NOTES
Renal Progress Note Admission Date: 2/13/2020 Subjective:  
  
Comfortable but frustrated Objective:  
 
Physical Exam:   
Patient Vitals for the past 8 hrs: 
 BP Temp Pulse Resp SpO2 Weight  
02/24/20 0823     95 %   
02/24/20 0805 118/60 98.5 °F (36.9 °C) 87 19 96 %   
02/24/20 0455 98/50 97.5 °F (36.4 °C) 92 18 94 % 111.8 kg (246 lb 8 oz) Gen: comfortable , NAD HEENT: moist membranes CV: S1, S2 
Lungs: Clear bilaterally Extem: no edema Current Facility-Administered Medications Medication Dose Route Frequency  [START ON 2/25/2020] warfarin (COUMADIN) tablet 4 mg  4 mg Oral QPM  
 warfarin (COUMADIN) tablet 3 mg  3 mg Oral NOW  warfarin (COUMADIN) tablet 2 mg  2 mg Oral ONCE  
 insulin lispro (HUMALOG) injection 6 Units  6 Units SubCUTAneous TIDAC  insulin glargine (LANTUS) injection 30 Units  30 Units SubCUTAneous DAILY  insulin glargine (LANTUS) injection 20 Units  20 Units SubCUTAneous QHS  insulin lispro (HUMALOG) injection   SubCUTAneous AC&HS  tamsulosin (FLOMAX) capsule 0.4 mg  0.4 mg Oral QHS  busPIRone (BUSPAR) tablet 10 mg  10 mg Oral TID  ALPRAZolam (XANAX) tablet 0.5 mg  0.5 mg Oral QID PRN  
 midodrine (PROAMITINE) tablet 5 mg  5 mg Oral TID WITH MEALS  sodium chloride (NS) flush 5-40 mL  5-40 mL IntraVENous Q8H  
 sodium hypochlorite (QUARTER STRENGTH DAKIN'S) 0.125% irrigation (bottle)   Topical DAILY  loperamide (IMODIUM) capsule 4 mg  4 mg Oral QID PRN  
 albuterol (PROVENTIL VENTOLIN) nebulizer solution 2.5 mg  2.5 mg Nebulization Q6H RT  
 sodium chloride 3% hypertonic nebulizer soln  4 mL Nebulization BID RT  
 pantothenic ac-min oil-pet,hyd (AQUAPHOR) 41 % ointment   Topical DAILY  mirtazapine (REMERON) tablet 15 mg  15 mg Oral QHS  traMADol (ULTRAM) tablet 50 mg  50 mg Oral Q6H PRN  
 sodium chloride (NS) flush 5-40 mL  5-40 mL IntraVENous Q8H  
 sodium chloride (NS) flush 5-40 mL  5-40 mL IntraVENous PRN  
  acetaminophen (TYLENOL) tablet 650 mg  650 mg Oral Q4H PRN  
 naloxone (NARCAN) injection 0.4 mg  0.4 mg IntraVENous PRN  
 ondansetron (ZOFRAN) injection 4 mg  4 mg IntraVENous Q4H PRN  
 bisacodyL (DULCOLAX) tablet 5 mg  5 mg Oral DAILY PRN Data Review:  
 
LABS:  
Recent Results (from the past 12 hour(s)) GLUCOSE, POC Collection Time: 02/23/20 10:35 PM  
Result Value Ref Range Glucose (POC) 269 (H) 65 - 100 mg/dL PROTHROMBIN TIME + INR Collection Time: 02/24/20  5:15 AM  
Result Value Ref Range Prothrombin time 20.8 (H) 12.0 - 14.7 sec INR 1.7 RENAL FUNCTION PANEL Collection Time: 02/24/20  5:15 AM  
Result Value Ref Range Sodium 138 136 - 145 mmol/L Potassium 3.7 3.5 - 5.1 mmol/L Chloride 102 98 - 107 mmol/L  
 CO2 26 21 - 32 mmol/L Anion gap 10 7 - 16 mmol/L Glucose 178 (H) 65 - 100 mg/dL BUN 43 (H) 8 - 23 MG/DL Creatinine 3.20 (H) 0.8 - 1.5 MG/DL  
 GFR est AA 25 (L) >60 ml/min/1.73m2 GFR est non-AA 20 (L) >60 ml/min/1.73m2 Calcium 7.9 (L) 8.3 - 10.4 MG/DL Phosphorus 3.5 2.3 - 3.7 MG/DL Albumin 1.8 (L) 3.2 - 4.6 g/dL GLUCOSE, POC Collection Time: 02/24/20  6:27 AM  
Result Value Ref Range Glucose (POC) 223 (H) 65 - 100 mg/dL Plan:  
 
Principal Problem: 
  Acute hypoxemic respiratory failure ([address] (1/10/2020) Active Problems: 
  DM type 2 (diabetes mellitus, type 2) ([address] (1/14/2013) HTN (hypertension) (1/14/2013) HLD (hyperlipidemia) (1/14/2013) CAD (coronary artery disease) (1/10/2020) S/P CABG x 3 (1/10/2020) S/P AVR (1/10/2020) Atrial fibrillation ([address] (1/13/2020) HIT (heparin-induced thrombocytopenia) ([facility] (1/23/2020) Pleural effusion (2/3/2020) HCAP (healthcare-associated pneumonia) (2/14/2020) Fluid overload (2/15/2020) Acute-on-chronic kidney injury ([facility] (2/17/2020) Atelectasis (2/17/2020) Hypotension (2/18/2020) Probably ESRD - for dialysis again on Tues 2/25/20 unless he shows signs of renal recovery. ([facility] schedule) he is making some urine but not sufficient. Labs with no sign of recovery. He asked me if he is terminal.  Discussed taht dialysis is life sustaining for patients with renal failure. He just says if he is not going to get functionally better, then he wants more palliatve measures. I suggesed that he may benefit from a conversation with palliative care team.  I think he has the possibility of improving for now 
  Dyspnea 
  
HIT+ 
  
ASHD s/p CABG 
  
Orthostatic Hypotension - on midodrine

## 2020-02-24 NOTE — PROGRESS NOTES
PT note: Pt was on hold earlier this morning per strict bed rest orders. RN clarified with MD that pt was able to have PT and OT. Will follow up with pt later as schedule allows.  
 
Carie Fields, RICK

## 2020-02-24 NOTE — ROUTINE PROCESS
Verbal bedside report given to Tai oncoming RN. Patient's situation, background, assessment and recommendations provided. Opportunity for questions provided. Oncoming RN assumed care of patient.

## 2020-02-24 NOTE — PROGRESS NOTES
Spoke with Dr. Ronald Zamora MD regarding patient's activity restrictions. Per Dr. Ronald Zamora, patient is okay to work with PT and OT with no restrictions.

## 2020-02-24 NOTE — PROGRESS NOTES
Warfarin dosing per pharmacist 
 
Ab Correa is a 69 y.o. male. Height: 5' 10\" (177.8 cm)    Weight: 111.8 kg (246 lb 8 oz) Indication:  AVR and afib Goal INR:  2.5 - 3.5 Home dose:  2.5 mg daily Risk factors or significant drug interactions:  Levofloxacin (2/13- 2/20 ), mirtazapine (started 2/13), escitalopram (started 2/19-2/20); amiodarone (dc'd 2/5) Other anticoagulants:  N/A Daily Monitoring Date  INR     Warfarin dose HGB              Notes 2/14  2.4  3 mg  8.7 2/15  2.2  4 mg  9.6 2/16  2.1  5 mg  9.8 2/17  2.1  5 mg  9.5 2/18  2.8  2 mg  9.3 2/19  2.5  2 mg  8.7 
2/20  1.9  3 mg   8.8 
2/21  1.9  4 mg  8.6 
2/22  2.1  3 mg  8.5 
2/23  1.9  4 mg  --- 
2/24  1.7  3 mg + 2 mg --- 
 
A/P:  levaquin and escitalopram stopped and patient's buspirone restarted. INR continues to trend down, now at 1.7. In an effort to get INR trending back up appropriately, will give 3 mg now and 2 mg this evening for a total dose of 5 mg today. Daily INRs. Pharmacy will continue to follow. Thank you, 
Mannie Orozco, PharmD, [facility] Clinical Pharmacist 
[phone]

## 2020-02-25 PROBLEM — J98.11 ATELECTASIS: Status: RESOLVED | Noted: 2020-01-01 | Resolved: 2020-01-01

## 2020-02-25 PROBLEM — J90 PLEURAL EFFUSION: Status: RESOLVED | Noted: 2020-01-01 | Resolved: 2020-01-01

## 2020-02-25 PROBLEM — N17.9 ACUTE-ON-CHRONIC KIDNEY INJURY (HCC): Status: RESOLVED | Noted: 2020-01-01 | Resolved: 2020-01-01

## 2020-02-25 PROBLEM — J18.9 HCAP (HEALTHCARE-ASSOCIATED PNEUMONIA): Status: RESOLVED | Noted: 2020-01-01 | Resolved: 2020-01-01

## 2020-02-25 PROBLEM — E87.70 FLUID OVERLOAD: Status: RESOLVED | Noted: 2020-01-01 | Resolved: 2020-01-01

## 2020-02-25 PROBLEM — N17.9 ACUTE RENAL FAILURE ON DIALYSIS (HCC): Status: ACTIVE | Noted: 2020-01-01

## 2020-02-25 PROBLEM — I48.91 ATRIAL FIBRILLATION (HCC): Chronic | Status: ACTIVE | Noted: 2020-01-01

## 2020-02-25 PROBLEM — Z99.2 ACUTE RENAL FAILURE ON DIALYSIS (HCC): Status: ACTIVE | Noted: 2020-01-01

## 2020-02-25 PROBLEM — I95.9 HYPOTENSION: Chronic | Status: ACTIVE | Noted: 2020-01-01

## 2020-02-25 PROBLEM — N18.9 ACUTE-ON-CHRONIC KIDNEY INJURY (HCC): Status: RESOLVED | Noted: 2020-01-01 | Resolved: 2020-01-01

## 2020-02-25 PROBLEM — J96.01 ACUTE HYPOXEMIC RESPIRATORY FAILURE (HCC): Status: RESOLVED | Noted: 2020-01-01 | Resolved: 2020-01-01

## 2020-02-25 NOTE — WOUND CARE
Follow up for wound. Coccyx/ Gluteal fold wound greatly improved, will discontinue dakin's and use NS moist to dry packing, daily. Left thigh scant drainage now, continue bandage to continue to facilitate healing, ok to use tape or tegederm instead of healing, discontinue aquacel ag. Right 5th toes and left 4th and 5th toes now fully pink, areas of necrosis remain on tips to 1st phalange of other toes, continue xeroform and dry dressing. Toes may demarcate further. Will monitor. Overall appearance of wounds and skin improved, today.

## 2020-02-25 NOTE — PROGRESS NOTES
Renal Progress Note Admission Date: 2/13/2020 Subjective:  
  
Patient seen on HD. No distress. Objective:  
 
Physical Exam:   
Patient Vitals for the past 8 hrs: 
 BP Temp Pulse Resp SpO2  
02/25/20 0903 100/52  86    
02/25/20 0835 105/55  80    
02/25/20 0802 (!) 90/39  80    
02/25/20 0744 103/48  82    
02/25/20 0516 93/45 97.8 °F (36.6 °C) 88 20 99 % Gen: comfortable , NAD HEENT: moist membranes CV: S1, S2, regular rate Lungs: Clear bilaterally, no audible wheezing Extem: mild edema, no cyanosis Current Facility-Administered Medications Medication Dose Route Frequency  warfarin (COUMADIN) tablet 5 mg  5 mg Oral QPM  
 insulin lispro (HUMALOG) injection 8 Units  8 Units SubCUTAneous TIDAC  insulin glargine (LANTUS) injection 40 Units  40 Units SubCUTAneous DAILY  polyethylene glycol (MIRALAX) packet 17 g  17 g Oral DAILY  clonazePAM (KlonoPIN) tablet 0.5 mg  0.5 mg Oral QHS PRN  
 traZODone (DESYREL) tablet 100 mg  100 mg Oral QHS  midodrine (PROAMITINE) tablet 10 mg  10 mg Oral TID WITH MEALS  insulin lispro (HUMALOG) injection   SubCUTAneous AC&HS  tamsulosin (FLOMAX) capsule 0.4 mg  0.4 mg Oral QHS  busPIRone (BUSPAR) tablet 10 mg  10 mg Oral TID  ALPRAZolam (XANAX) tablet 0.5 mg  0.5 mg Oral QID PRN  
 sodium chloride (NS) flush 5-40 mL  5-40 mL IntraVENous Q8H  
 sodium hypochlorite (QUARTER STRENGTH DAKIN'S) 0.125% irrigation (bottle)   Topical DAILY  loperamide (IMODIUM) capsule 4 mg  4 mg Oral QID PRN  
 albuterol (PROVENTIL VENTOLIN) nebulizer solution 2.5 mg  2.5 mg Nebulization Q6H RT  
 sodium chloride 3% hypertonic nebulizer soln  4 mL Nebulization BID RT  
 pantothenic ac-min oil-pet,hyd (AQUAPHOR) 41 % ointment   Topical DAILY  traMADol (ULTRAM) tablet 50 mg  50 mg Oral Q6H PRN  
 sodium chloride (NS) flush 5-40 mL  5-40 mL IntraVENous Q8H  
 sodium chloride (NS) flush 5-40 mL  5-40 mL IntraVENous PRN  
  acetaminophen (TYLENOL) tablet 650 mg  650 mg Oral Q4H PRN  
 naloxone (NARCAN) injection 0.4 mg  0.4 mg IntraVENous PRN  
 ondansetron (ZOFRAN) injection 4 mg  4 mg IntraVENous Q4H PRN  
 bisacodyL (DULCOLAX) tablet 5 mg  5 mg Oral DAILY PRN Data Review:  
 
LABS:  
Recent Results (from the past 12 hour(s)) GLUCOSE, POC Collection Time: 02/24/20 10:46 PM  
Result Value Ref Range Glucose (POC) 232 (H) 65 - 100 mg/dL PROTHROMBIN TIME + INR Collection Time: 02/25/20  4:21 AM  
Result Value Ref Range Prothrombin time 20.8 (H) 12.0 - 14.7 sec INR 1.7 METABOLIC PANEL, COMPREHENSIVE Collection Time: 02/25/20  4:21 AM  
Result Value Ref Range Sodium 137 136 - 145 mmol/L Potassium 4.6 3.5 - 5.1 mmol/L Chloride 103 98 - 107 mmol/L  
 CO2 27 21 - 32 mmol/L Anion gap 7 7 - 16 mmol/L Glucose 128 (H) 65 - 100 mg/dL BUN 52 (H) 8 - 23 MG/DL Creatinine 3.11 (H) 0.8 - 1.5 MG/DL  
 GFR est AA 25 (L) >60 ml/min/1.73m2 GFR est non-AA 21 (L) >60 ml/min/1.73m2 Calcium 8.0 (L) 8.3 - 10.4 MG/DL Bilirubin, total 0.3 0.2 - 1.1 MG/DL  
 ALT (SGPT) 37 12 - 65 U/L  
 AST (SGOT) 35 15 - 37 U/L Alk. phosphatase 99 50 - 136 U/L Protein, total 5.1 (L) 6.3 - 8.2 g/dL Albumin 1.9 (L) 3.2 - 4.6 g/dL Globulin 3.2 2.3 - 3.5 g/dL A-G Ratio 0.6 (L) 1.2 - 3.5 GLUCOSE, POC Collection Time: 02/25/20  6:04 AM  
Result Value Ref Range Glucose (POC) 138 (H) 65 - 100 mg/dL Plan:  
 
Principal Problem: 
  Acute hypoxemic respiratory failure ([facility] (1/10/2020) Active Problems: 
  DM type 2 (diabetes mellitus, type 2) ([facility] [facility] (1/14/2013) HTN (hypertension) (1/14/2013) HLD (hyperlipidemia) (1/14/2013) CAD (coronary artery disease) (1/10/2020) S/P CABG x 3 (1/10/2020) S/P AVR (1/10/2020) [facility] (1[facility]13[facility]) HIT (heparin-induced thrombocytopenia) [facility] (1/23/2020) Pleural effusion (2/3/2020) HCAP (healthcare-associated pneumonia) (2/14/2020) Fluid overload (2/15/2020) Acute-on-chronic kidney injury ([address] (2/17/2020) Atelectasis (2/17/2020) Hypotension (2/18/2020) Probably ESRD - seen on HD this morning - /52. Decreased temp to 35 C. , . 
- ([place] [facility] schedule) 
- he is making some urine. Hold HD for the next 48-72 hours and observe urine output / labs. Finish today's session of HD for additional volume removal and solute clearance. Per discussion with Dr. Eulalio Lacy: \"He just says if he is not going to get functionally better, then he wants more palliatve measures. I suggesed that he may benefit from a conversation with palliative care team.  I think he has the possibility of improving for now\" 
  
Dyspnea 
  
HIT+ 
  
ASHD s/p CABG 
  
Orthostatic Hypotension - on midodrine Daily BMP, Mag, Phos recommended. Laxmi Dunn MD 
[facility] Neph[facility]

## 2020-02-25 NOTE — PROGRESS NOTES
Problem: Mobility Impaired (Adult and Pediatric) Goal: *Acute Goals and Plan of Care (Insert Text) Description LTG: 
(1.)Mr. Lula Polanco will move from supine to sit and sit to supine , scoot up and down and roll side to side with MODIFIED INDEPENDENCE within 7 treatment day(s) from flat surface. Goal met 2/25/20 
(2.)Mr. Lula Polanco will transfer from bed to chair and chair to bed with MODIFIED INDEPENDENCE using the least restrictive device within 7 treatment day(s). (3.)Mr. Lula Polanco will ambulate with SUPERVISION for 250+ feet with the least restrictive device within 7 treatment day(s) while maintaining normal vital signs. ____________________________________________________________________________________________ Outcome: Progressing Towards Goal 
  
PHYSICAL THERAPY: Daily Note and PM 2/25/2020 INPATIENT: PT Visit Days : 5 Payor: Valentina Cardoso / Plan: Carlie Copier / Product Type: Managed Care Medicare /   
  
NAME/AGE/GENDER: Mayco Leslie is a 69 y.o. male PRIMARY DIAGNOSIS: [facility] [J96.00] Fluid overload [E87.70] Acute hypoxemic respiratory failure (HCC) Acute hypoxemic respiratory failure (HCC) Procedure(s) (LRB): BRONCHOSCOPY (N/A) BRONCHIAL ALVEOLAR LAVAGE (N/A) 8 Days Post-Op ICD-10: Treatment Diagnosis:  
 · Generalized Muscle Weakness (M62.81) · Difficulty in walking, Not elsewhere classified (R26.2) Precaution/Allergies: 
Heparin; Amoxicillin; Keflex [cephalexin]; and Pcn [penicillins] ASSESSMENT:  
 
Mr. Lula Polanco was sidelying in the bed upon contact. He is agreeable to therapy. Bed mobility is with supervision. Sitting balance edge of bed is good. The RN is needing a weight on the patient therefore he stood up several times on the scale. He did well however he c/o feeling dizzy.   Patient is returned to sitting edge of bed and RT arrives and he continued sitting edge of bed for his breathing treatment. After that the patient sat for a few minutes longer but was then returned to sidelying in the bed. Patient bed mobility is wonderful. Good session. Making progress however gait deferred at this time due to the dizziness and double vision. Patient is encouraged to increased OOB activities. Limited progress noted today due to dizziness  Will continue with POC. This section established at most recent assessment PROBLEM LIST (Impairments causing functional limitations): 1. Decreased Strength 2. Decreased ADL/Functional Activities 3. Decreased Transfer Abilities 4. Decreased Ambulation Ability/Technique 5. Decreased Balance 6. Decreased Activity Tolerance 7. Increased Fatigue 8. Increased Shortness of Breath 9. Edema/Girth INTERVENTIONS PLANNED: (Benefits and precautions of physical therapy have been discussed with the patient.) 1. Balance Exercise 2. Bed Mobility 3. Family Education 4. Gait Training 5. Home Exercise Program (HEP) 6. Neuromuscular Re-education/Strengthening 7. Therapeutic Activites 8. Therapeutic Exercise/Strengthening 9. Transfer Training TREATMENT PLAN: Frequency/Duration: 3 times a week for duration of hospital stay Rehabilitation Potential For Stated Goals: Good REHAB RECOMMENDATIONS (at time of discharge pending progress):   
Placement: It is my opinion, based on this patient's performance to date, that Mr. Joanna Estrella may benefit from intensive therapy at a [address] [place] [address] after discharge due to the functional deficits listed above that are likely to improve with skilled rehabilitation and concerns that he/she may be unsafe to be unsupervised at home due to a fall risk, safety concerns for transfers and ambulation at home. Equipment: ? To be determined HISTORY:  
History of Present Injury/Illness (Reason for Referral): Mr. 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 Was DC 2/12/20 from CABG and aortic valve replacement complicated by CKD requiring HD, HIT +, DVTs, wound left thigh on wound vac, pneumonia DC on levaquin Q 48 hours last dose 2/11/20. He was DC on 2 L O2 however has been increased to 4L O2 via NC at STR yesterday when he arrived. Today he was at HD and reports dizziness with sitting up and acute onset of SOB. Son at bedside and states pt was doing ok since DC yesterday until today at HD. INR 2.4. CXR showed improving interstitial edema, unchanged left basilar opacity and left pleural effusion. Pt given lasix in ED. Pt is SOB when talking in short sentences. Denies CP, n/v.  Has had diarrhea however is not new since hospitalization. Past Medical History/Comorbidities:  
Mr. Merlinda Och  has a past medical history of Arthritis, BPH (benign prostatic hyperplasia) (1/14/2013), CAD (coronary artery disease), DM type 2 (diabetes mellitus, type 2) ([facility] (dx 2004), Dyspnea, Gout (1/14/2013), HLD (hyperlipidemia) (1/14/2013), HTN (hypertension), Morbid obesity ([facility] (9/3/14), Psychiatric disorder, Rheumatic fever, Seasonal allergic rhinitis, Severe aortic valve stenosis, Thyroid disease, and Unspecified sleep apnea (2016). Mr. Merlinda Och  has a past surgical history that includes hx tonsil and adenoidectomy; hx heart catheterization (12/23/2019); hx orthopaedic (Left); hx cataract removal (Bilateral, 2012); and ir insert tunl cvc w port over 5 years (2/4/2020). Social History/Living Environment:  
Home Environment: Private residence # Steps to Enter: 2 Rails to Enter: No 
One/Two Story Residence: One story Living Alone: No 
Support Systems: Spouse/Significant Other/Partner Patient Expects to be Discharged to[de-identified] Rehabilitation facility Current DME Used/Available at Home: Cane, straight, Walker, rolling Tub or Shower Type: Shower Prior Level of Function/Work/Activity: 
Lives with spouse, admit from rehab; has been using RW short distance ambulation, assist ADLs Personal Factors:   
      Sex:  male Age:  69 y.o. Overall Behavior:  agreeable Number of Personal Factors/Comorbidities that affect the Plan of Care: 
CAD, DM, DVTs, age 3+: HIGH COMPLEXITY EXAMINATION:  
Most Recent Physical Functioning:  
Gross Assessment: 
  
         
  
Posture: 
  
Balance: 
Sitting: Intact Standing: (n/a) Bed Mobility: 
Rolling: Supervision Supine to Sit: Supervision Sit to Supine: Supervision Scooting: Supervision Wheelchair Mobility: 
  
Transfers: 
  
Gait: 
  
   
  
Body Structures Involved: 1. Heart 2. Lungs 3. Muscles Body Functions Affected: 1. Cardio 2. Respiratory 3. Movement Related Activities and Participation Affected: 1. General Tasks and Demands 2. Mobility 3. Self Care Number of elements that affect the Plan of Care: 4+: HIGH COMPLEXITY CLINICAL PRESENTATION:  
Presentation: Evolving clinical presentation with changing clinical characteristics: MODERATE COMPLEXITY CLINICAL DECISION MAKIN[address] [place] [address] [facility] 6 Clicks Basic Mobility Inpatient Short Form How much difficulty does the patient currently have. .. Unable A Lot A Little None 1. Turning over in bed (including adjusting bedclothes, sheets and blankets)? [] 1   [] 2   [x] 3   [] 4  
2. Sitting down on and standing up from a chair with arms ( e.g., wheelchair, bedside commode, etc.)   [] 1   [] 2   [x] 3   [] 4  
3. Moving from lying on back to sitting on the side of the bed? [] 1   [] 2   [x] 3   [] 4 How much help from another person does the patient currently need. .. Total A Lot A Little None 4. Moving to and from a bed to a chair (including a wheelchair)?    [] 1   [] 2   [x] 3   [] 4  
 5.  Need to walk in hospital room? [] 1   [] 2   [x] 3   [] 4  
6. Climbing 3-5 steps with a railing? [] 1   [x] 2   [] 3   [] 4  
© 2007, Trustees of [address] [place] [address] [address] [id number], under license to [employer]. All rights reserved Score:  Initial: 17 Most Recent: X (Date: -- ) Interpretation of Tool:  Represents activities that are increasingly more difficult (i.e. Bed mobility, Transfers, Gait). Medical Necessity:    
· Patient is expected to demonstrate progress in  
· strength, range of motion, balance, and coordination ·  to  
· decrease assistance required with overall functional mobility, transfers, ambulation · . · Patient demonstrates · good ·  rehab potential due to higher previous functional level. Reason for Services/Other Comments: 
· Patient · continues to require present interventions due to patient's inability to perform bed mobility, transfers, ambulation safely and effectively · . Use of outcome tool(s) and clinical judgement create a POC that gives a: Clear prediction of patient's progress: LOW COMPLEXITY  
  
 
 
 
TREATMENT:  
(In addition to Assessment/Re-Assessment sessions the following treatments were rendered) Pre-treatment Symptoms/Complaints:  \"I get dizzy when I sit up\" Pain: Initial:  
Pain Intensity 1: 0  Post Session: 0/10 Gait Training ( ):  Gait training to improve and/or restore physical functioning as related to mobility, strength, balance, and coordination. Ambulated   with   using a   and minimal   related to their stance phase and stride length to promote proper body alignment, promote proper body posture, promote proper body mechanics, and promote proper body breathing techniques. Instruction in performance of posture, walker safety to correct overall functional mobility. Therapeutic Activity: (    15 minutes):   Therapeutic activities including Bed transfers and sitting balance to improve mobility, strength, balance and coordination. Required moderate   to promote static and dynamic balance in standing, promote coordination of bilateral, upper extremity(s), lower extremity(s) and safe use of RW for transfers/ambulation. Therapeutic Exercise: (   minutes):  Exercises per grid below to improve mobility and strength. Required minimal visual and verbal cues to promote proper body posture and promote proper body mechanics. Progressed range and repetitions as indicated. Date: 
2/19/20 Date: 
2/21/20 Date: Activity/Exercise Parameters Parameters Parameters LAQ 1 x 20 B X 20 B Seated marching 1 x 15 B X 15 B APs 2 x 20 B X 20 B Hip abd  X 15 B Braces/Orthotics/Lines/Etc:  
· Nasal cannula Treatment/Session Assessment:   
· Response to Treatment:  Patient c/o being dizzy and seeing doubles · Interdisciplinary Collaboration:  
o Physical Therapy Assistant 
o Registered Nurse · After treatment position/precautions:  
o Supine in bed 
o Bed alarm/tab alert on 
o Bed/Chair-wheels locked 
o Bed in low position 
o Call light within reach 
o RN notified 
o Family at bedside · Compliance with Program/Exercises: Will assess as treatment progresses · Recommendations/Intent for next treatment session: \"Next visit will focus on advancements to more challenging activities\". Total Treatment Duration: PT Patient Time In/Time Out Time In: 0299 Time Out: 1530 [place] Gum, PTA

## 2020-02-25 NOTE — PROGRESS NOTES
PT note:  Treatment deferred as the patient is off the floor at dialysis. Will return as time permits.  Brian Pratt, PTA

## 2020-02-25 NOTE — PROGRESS NOTES
Hospitalist Progress Note Subjective:  
 
Mr. 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 He  Had been  DC one day before on  2/12/20 after a prolonged admission due to  CABG and aortic valve replacement complicated with DEJUAN requiring HD, HIT +, DVTs, wound left thig, pneumonia and necrosis of several toes possible related to the use of  Levophed/ HIT.   On   2/13 he was at HD and reported dizziness with sitting up and acute onset of SOB. He was sent back to the [facility] SYS showed improving interstitial edema, unchanged left basilar opacity and left pleural effusion. Pt given lasix in ED. Chest CT showed unchanged in general, no PE. He had been discharged on levaquin and this antibiotic was  continued with EOT 2/20/20. Pt had a sacral wound present on admission.  Pulmonary consulted.  Had bronch 2/17 with findings of no obstruction, small effusion.  Pt has been hypotensive, complicating HD. Started on midodrine for hypotension. At the present time we are  titrating his dose of Midodrine and insulin. Complicating discharge as his insurance has denied him more rehab days. He is very depressed and has reported difficulty sleeping at night. Patient thinks he would be better off if he dies. Consider psych consultation. Current Facility-Administered Medications Medication Dose Route Frequency  [START ON 2/25/2020] warfarin (COUMADIN) tablet 4 mg  4 mg Oral QPM  
 [START ON 2/25/2020] insulin lispro (HUMALOG) injection 8 Units  8 Units SubCUTAneous TIDAC  
 [START ON 2/25/2020] insulin glargine (LANTUS) injection 40 Units  40 Units SubCUTAneous DAILY  [START ON 2/25/2020] polyethylene glycol (MIRALAX) packet 17 g  17 g Oral DAILY  clonazePAM (KlonoPIN) tablet 0.5 mg  0.5 mg Oral QHS PRN  
  traZODone (DESYREL) tablet 100 mg  100 mg Oral QHS  insulin lispro (HUMALOG) injection   SubCUTAneous AC&HS  tamsulosin (FLOMAX) capsule 0.4 mg  0.4 mg Oral QHS  busPIRone (BUSPAR) tablet 10 mg  10 mg Oral TID  ALPRAZolam (XANAX) tablet 0.5 mg  0.5 mg Oral QID PRN  
 midodrine (PROAMITINE) tablet 5 mg  5 mg Oral TID WITH MEALS  sodium chloride (NS) flush 5-40 mL  5-40 mL IntraVENous Q8H  
 sodium hypochlorite (QUARTER STRENGTH DAKIN'S) 0.125% irrigation (bottle)   Topical DAILY  loperamide (IMODIUM) capsule 4 mg  4 mg Oral QID PRN  
 albuterol (PROVENTIL VENTOLIN) nebulizer solution 2.5 mg  2.5 mg Nebulization Q6H RT  
 sodium chloride 3% hypertonic nebulizer soln  4 mL Nebulization BID RT  
 pantothenic ac-min oil-pet,hyd (AQUAPHOR) 41 % ointment   Topical DAILY  traMADol (ULTRAM) tablet 50 mg  50 mg Oral Q6H PRN  
 sodium chloride (NS) flush 5-40 mL  5-40 mL IntraVENous Q8H  
 sodium chloride (NS) flush 5-40 mL  5-40 mL IntraVENous PRN  
 acetaminophen (TYLENOL) tablet 650 mg  650 mg Oral Q4H PRN  
 naloxone (NARCAN) injection 0.4 mg  0.4 mg IntraVENous PRN  
 ondansetron (ZOFRAN) injection 4 mg  4 mg IntraVENous Q4H PRN  
 bisacodyL (DULCOLAX) tablet 5 mg  5 mg Oral DAILY PRN Review of Systems A comprehensive review of systems was negative except for that written in the HPI. Objective:  
 
Visit Vitals /58 (BP 1 Location: Right arm, BP Patient Position: At rest) Pulse 89 Temp 98.3 °F (36.8 °C) Resp 21 Ht 5' 10\" (1.778 m) Wt 111.8 kg (246 lb 8 oz) SpO2 95% BMI 35.37 kg/m² O2 Flow Rate (L/min): 2 l/min O2 Device: Nasal cannula Temp (24hrs), Av °F (36.7 °C), Min:97.5 °F (36.4 °C), Max:98.5 °F (36.9 °C) No intake/output data recorded.  07 -  1900 In: 600 [P.O.:600] Out: 873 [Urine:870] General appearance: Oriented and alert Head: Normocephalic, without obvious abnormality, atraumatic Eyes: conjunctivae/corneas clear. PERRL Throat: Lips, mucosa, and tongue normal. Teeth and gums normal 
Neck: supple, symmetrical, trachea midline, and no JVD Lungs: Scattered crackles, otherwise clear to auscultation bilaterally Heart: regular rate and rhythm, S1, S2 normal, no murmur, click, rub or gallop Abdomen: soft, non-tender. Bowel sounds normal. No masses,  no organomegaly Extremities: necrotic ischemia of the left big toe and second R toe Skin: Skin color, texture, turgor normal. No rashes or lesions Neurologic: Grossly normal 
  
Additional comments: Notes,orders, test results, vitals reviewed Data Review Ref. Range 2/21/2020 04:15 WBC Latest Ref Range: 4.3 - 11.1 K/uL 8.6 RBC Latest Ref Range: 4.23 - 5.6 M/uL 2.93 (L) HGB Latest Ref Range: 13.6 - 17.2 g/dL 8.6 (L) HCT Latest Ref Range: 41.1 - 50.3 % 27.4 (L) MCV Latest Ref Range: 79.6 - 97.8 FL 93.5 MCH Latest Ref Range: 26.1 - 32.9 PG 29.4 MCHC Latest Ref Range: 31.4 - 35.0 g/dL 31.4 RDW Latest Ref Range: 11.9 - 14.6 % 13.7 PLATELET Latest Ref Range: 150 - 450 K/uL 204 MPV Latest Ref Range: 9.4 - 12.3 FL 10.1 ABSOLUTE NRBC Latest Ref Range: 0.0 - 0.2 K/uL 0.00 INR Latest Units:   1.9 Prothrombin time Latest Ref Range: 12.0 - 14.7 sec 22.6 (H) Sodium Latest Ref Range: 136 - 145 mmol/L 139 Potassium Latest Ref Range: 3.5 - 5.1 mmol/L 4.1 Chloride Latest Ref Range: 98 - 107 mmol/L 102 CO2 Latest Ref Range: 21 - 32 mmol/L 28 Anion gap Latest Ref Range: 7 - 16 mmol/L 9 Glucose Latest Ref Range: 65 - 100 mg/dL 185 (H) BUN Latest Ref Range: 8 - 23 MG/DL 42 (H) Creatinine Latest Ref Range: 0.8 - 1.5 MG/DL 3.53 (H) Calcium Latest Ref Range: 8.3 - 10.4 MG/DL 7.7 (L) GFR est non-AA Latest Ref Range: >60 ml/min/1.73m2 18 (L) GFR est AA Latest Ref Range: >60 ml/min/1.73m2 22 (L)  
 
2/17:  CXR;  Unchanged left lung base atelectasis or infiltrate and small pleural effusion. 
  
 2/13:  CT CHEST:  No evidence of pulmonary embolism. Left lower lobe atelectasis or pneumonia with a left pleural effusion. Linear atelectasis of the right middle and right upper lobes. Dilated diffuse fluid-filled esophagus. No evidence of a hiatal hernia or obstructing mass. I question of the patient could have gastroesophageal reflux disease. Coronary artery disease status post CABG. Assessment/Plan:  
Acute on chronic hypoxemic respiratory failure, HCAP Finished levaquin Weaning oxygen Flutter valve 2/17:  Bronch with no obstruction DVT: 
            Continue coumadin Monitor his INR level HIT + last admission  
  
IDDM type 2:  A1C: 7.6 Patient was on lantus bid. Switched to Lantus 40 U /day Humalog  Dose increased to 8 U tid before meals 
 humalog SS Hypotension with baseline HTN Continue midodrine, increase dose today to 10 mg po tid ( patient feeling very dizzy when he was taken out of bed) Holding all antihypertensives 
  
HLD: Home meds 
  
CAD with 3 vessel CABG and AVR 1/10/20 Continue coumadin and post op orders.  
  
Chronic Atrial fibrillation:  Continue meds 
  
Fluid overload Dialysis Depression- anxiety Started on trazodone at night Anxiety 
 buspar BPH On Flomax 
  
ESRD on new HD 
            continue HD Nephrology on board Signed By: Nicki Guadalupe MD   
 February 21, 2020

## 2020-02-25 NOTE — DIALYSIS
TRANSFER IN - DIALYSIS Received patient in dialysis unit  from room 334 (unit) for ordered procedure. Consent verified for renal replacement therapy. Patient alert and vital signs stable. /48 P 82 Hemodialysis initiated using left perm cath. Aspirated and flushed both ports without difficulty. Dressing clean, dry and intact. Machine settings per MD order. Heparin 0 unit bolus and 0 units/hr. Will monitor during treatment.

## 2020-02-25 NOTE — PROGRESS NOTES
Problem: Falls - Risk of 
Goal: *Absence of Falls Description Document Spike Gracia Fall Risk and appropriate interventions in the flowsheet. Outcome: Progressing Towards Goal 
Note: Fall Risk Interventions: 
Mobility Interventions: Bed/chair exit alarm, Communicate number of staff needed for ambulation/transfer, Patient to call before getting OOB, PT Consult for mobility concerns, PT Consult for assist device competence, OT consult for ADLs Mentation Interventions: Bed/chair exit alarm, Increase mobility, More frequent rounding Medication Interventions: Bed/chair exit alarm, Evaluate medications/consider consulting pharmacy, Patient to call before getting OOB, Teach patient to arise slowly Elimination Interventions: Bed/chair exit alarm, Call light in reach, Patient to call for help with toileting needs, Toileting schedule/hourly rounds History of Falls Interventions: Bed/chair exit alarm

## 2020-02-25 NOTE — PROGRESS NOTES
Warfarin dosing per pharmacist 
 
Ibeth Courtney is a 69 y.o. male. Height: 5' 10\" (177.8 cm)    Weight: 111.8 kg (246 lb 8 oz) Indication:  AVR and afib Goal INR:  2.5 - 3.5 Home dose:  2.5 mg daily Risk factors or significant drug interactions: amiodarone (dc'd 2/5), Levofloxacin (2/13- 2/20 ), mirtazapine (2/13-2/24), escitalopram (2/19-2/20), trazodone (started 2/24) Other anticoagulants:  N/A Daily Monitoring Date  INR     Warfarin dose HGB              Notes 2/14  2.4  3 mg  8.7 2/15  2.2  4 mg  9.6 2/16  2.1  5 mg  9.8 2/17  2.1  5 mg  9.5 2/18  2.8  2 mg  9.3 2/19  2.5  2 mg  8.7 
2/20  1.9  3 mg   8.8 
2/21  1.9  4 mg  8.6 
2/22  2.1  3 mg  8.5 
2/23  1.9  4 mg  --- 
2/24  1.7  3 mg + 2 mg --- 
2/25  1.7  5 mg  --- 
 
INR = 1.7. Received a total of 5 mg yesterday. Will continue 5 mg this evening. Of note, mirtazapine d/c and trazodone started. Continue to monitor daily INRs. Pharmacy will continue to follow. Thank you, Yosi Asher, PharmD, BCCCP  [facility] [email or web address]

## 2020-02-25 NOTE — PROGRESS NOTES
Hospitalist Note Admit Date:  2020  3:03 PM  
Name:  Martha Mendoza Age:  69 y.o. 
:  1945 MRN:  [id number] PCP:  Susi Stinson MD 
Treatment Team: Attending Provider: Monique Uribe MD; Consulting Provider: Lili Renner MD; Utilization Review: Alberto Salas RN; Care Manager: Tae Siegel; Hospitalist: Jessica Rankin NP; Hospitalist: Abhi Hale NP; Physical Therapy Assistant: Sandee Padilla PTA; Occupational Therapist: Pernell Troncoso OT 
 
HPI/Subjective:  
Mr. 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 He  Had been  DC one day before on  20 after a prolonged admission due to  CABG and aortic valve replacement complicated with DEJUAN requiring HD, HIT +, DVTs, wound left thig, pneumonia and necrosis of several toes possible related to the use of  Levophed/ HIT.   On    he was at HD and reported dizziness with sitting up and acute onset of SOB. He was sent back to the [facility] SYS showed improving interstitial edema, unchanged left basilar opacity and left pleural effusion. Pt given lasix in ED. Chest CT showed unchanged in general, no PE. He had been discharged on levaquin and this antibiotic was  continued with EOT 20. Pt had a sacral wound present on admission.  Pulmonary consulted.  Had bronch  with findings of no obstruction, small effusion.  Pt has been hypotensive, complicating HD. Started on midodrine for hypotension. At the present time we are  titrating his dose of Midodrine and insulin. Complicated discharge as his insurance has denied him more rehab days.  - pt seen on HD. No complaints. feels OK.   Is ok with going home if insurance doesn't approve resubmission of rehab need. No fevers, CP, SOB No other complaints Objective:  
 
Patient Vitals for the past 24 hrs: 
 Temp Pulse Resp BP SpO2  
02/25/20 0959  78  107/55   
02/25/20 0933  81  98/53   
02/25/20 0903  86  100/52   
02/25/20 0835  80  105/55   
02/25/20 0802  80  (!) 90/39   
02/25/20 0744  82  103/48   
02/25/20 0516 97.8 °F (36.6 °C) 88 20 93/45 99 % 02/25/20 0057 97.5 °F (36.4 °C) 90 20 158/50 94 % 02/24/20 2358     92 % 02/24/20 2035 98.9 °F (37.2 °C) 98 20 91/46 94 % 02/24/20 1816 98.3 °F (36.8 °C) 89 21 115/58 95 % 02/24/20 1302 98.2 °F (36.8 °C) 91 18 95/52 96 % Oxygen Therapy O2 Sat (%): 99 % (02/25/20 0516) Pulse via Oximetry: 91 beats per minute (02/24/20 2358) O2 Device: Room air (02/25/20 0040) O2 Flow Rate (L/min): 3 l/min (02/24/20 2358) FIO2 (%): 21 % (02/23/20 1308) Estimated body mass index is 35.37 kg/m² as calculated from the following: 
  Height as of this encounter: 5' 10\" (1.778 m). Weight as of this encounter: 111.8 kg (246 lb 8 oz). Intake/Output Summary (Last 24 hours) at 2/25/2020 1002 Last data filed at 2/25/2020 4968 Gross per 24 hour Intake 360 ml Output 825 ml Net -465 ml *Note that automatically entered I/Os may not be accurate; dependent on patient compliance with collection and accurate  by techs. General:    Well nourished. Alert. Lungs:   CTAB. No wheezing, rhonchi, or rales. Extremities: Warm and dry. No cyanosis or edema. Skin:     No rashes or jaundice. Neuro:  No gross focal deficits Data Review: 
I have reviewed all labs, meds, and studies from the last 24 hours: 
 
Recent Results (from the past 24 hour(s)) GLUCOSE, POC Collection Time: 02/24/20 11:01 AM  
Result Value Ref Range Glucose (POC) 280 (H) 65 - 100 mg/dL GLUCOSE, POC Collection Time: 02/24/20  4:01 PM  
Result Value Ref Range Glucose (POC) 205 (H) 65 - 100 mg/dL GLUCOSE, POC Collection Time: 02/24/20  8:50 PM  
Result Value Ref Range Glucose (POC) 256 (H) 65 - 100 mg/dL GLUCOSE, POC Collection Time: 02/24/20 10:46 PM  
Result Value Ref Range Glucose (POC) 232 (H) 65 - 100 mg/dL PROTHROMBIN TIME + INR Collection Time: 02/25/20  4:21 AM  
Result Value Ref Range Prothrombin time 20.8 (H) 12.0 - 14.7 sec INR 1.7 METABOLIC PANEL, COMPREHENSIVE Collection Time: 02/25/20  4:21 AM  
Result Value Ref Range Sodium 137 136 - 145 mmol/L Potassium 4.6 3.5 - 5.1 mmol/L Chloride 103 98 - 107 mmol/L  
 CO2 27 21 - 32 mmol/L Anion gap 7 7 - 16 mmol/L Glucose 128 (H) 65 - 100 mg/dL BUN 52 (H) 8 - 23 MG/DL Creatinine 3.11 (H) 0.8 - 1.5 MG/DL  
 GFR est AA 25 (L) >60 ml/min/1.73m2 GFR est non-AA 21 (L) >60 ml/min/1.73m2 Calcium 8.0 (L) 8.3 - 10.4 MG/DL Bilirubin, total 0.3 0.2 - 1.1 MG/DL  
 ALT (SGPT) 37 12 - 65 U/L  
 AST (SGOT) 35 15 - 37 U/L Alk. phosphatase 99 50 - 136 U/L Protein, total 5.1 (L) 6.3 - 8.2 g/dL Albumin 1.9 (L) 3.2 - 4.6 g/dL Globulin 3.2 2.3 - 3.5 g/dL A-G Ratio 0.6 (L) 1.2 - 3.5 GLUCOSE, POC Collection Time: 02/25/20  6:04 AM  
Result Value Ref Range Glucose (POC) 138 (H) 65 - 100 mg/dL All Micro Results Procedure Component Value Units Date/Time CULTURE, RESPIRATORY/SPUTUM/BRONCH Sarabjit Fierro [[id number]] Collected:  02/17/20 1220 Order Status:  Completed Specimen:  Sputum from Bronchial Washing Updated:  02/24/20 7965 Special Requests: NO SPECIAL REQUESTS     
  GRAM STAIN 20 TO 35 WBCS SEEN PER OIF  
   1 TO 2 EPITHELIAL CELLS SEEN PER OIF  
      
  MODERATE GRAM POSITIVE COCCI  
     
   FEW GRAM POSITIVE RODS Culture result:    
  LIGHT NORMAL RESPIRATORY BRENNAN Current Meds: 
Current Facility-Administered Medications Medication Dose Route Frequency  warfarin (COUMADIN) tablet 5 mg  5 mg Oral QPM  
 insulin lispro (HUMALOG) injection 10 Units  10 Units SubCUTAneous TIDAC  insulin lispro (HUMALOG) injection   SubCUTAneous AC&HS  insulin glargine (LANTUS) injection 40 Units  40 Units SubCUTAneous DAILY  polyethylene glycol (MIRALAX) packet 17 g  17 g Oral DAILY  clonazePAM (KlonoPIN) tablet 0.5 mg  0.5 mg Oral QHS PRN  
 traZODone (DESYREL) tablet 100 mg  100 mg Oral QHS  midodrine (PROAMITINE) tablet 10 mg  10 mg Oral TID WITH MEALS  tamsulosin (FLOMAX) capsule 0.4 mg  0.4 mg Oral QHS  busPIRone (BUSPAR) tablet 10 mg  10 mg Oral TID  ALPRAZolam (XANAX) tablet 0.5 mg  0.5 mg Oral QID PRN  
 sodium chloride (NS) flush 5-40 mL  5-40 mL IntraVENous Q8H  
 sodium hypochlorite (QUARTER STRENGTH DAKIN'S) 0.125% irrigation (bottle)   Topical DAILY  loperamide (IMODIUM) capsule 4 mg  4 mg Oral QID PRN  
 albuterol (PROVENTIL VENTOLIN) nebulizer solution 2.5 mg  2.5 mg Nebulization Q6H RT  
 sodium chloride 3% hypertonic nebulizer soln  4 mL Nebulization BID RT  
 pantothenic ac-min oil-pet,hyd (AQUAPHOR) 41 % ointment   Topical DAILY  traMADol (ULTRAM) tablet 50 mg  50 mg Oral Q6H PRN  
 sodium chloride (NS) flush 5-40 mL  5-40 mL IntraVENous Q8H  
 sodium chloride (NS) flush 5-40 mL  5-40 mL IntraVENous PRN  
 acetaminophen (TYLENOL) tablet 650 mg  650 mg Oral Q4H PRN  
 naloxone (NARCAN) injection 0.4 mg  0.4 mg IntraVENous PRN  
 ondansetron (ZOFRAN) injection 4 mg  4 mg IntraVENous Q4H PRN  
 bisacodyL (DULCOLAX) tablet 5 mg  5 mg Oral DAILY PRN Other Studies: No results found for this visit on 02/13/20. No results found. Assessment and Plan:  
 
Hospital Problems as of 2/25/2020 Date Reviewed: 2/24/2020 Codes Class Noted - Resolved POA Acute renal failure on dialysis [facility]) ICD-10-CM: N17.9, Z99.2 ICD-9-CM: 584.9, V45.11  2/25/2020 - Present Yes Hypotension (Chronic) ICD-10-CM: I95.9 ICD-9-CM: 458.9  2/18/2020 - Present Yes HIT (heparin-induced thrombocytopenia) (HCC) (Chronic) ICD-10-CM: Q51.34 ICD-9-CM: 289.84  1/23/2020 - Present Yes Atrial fibrillation (HCC) (Chronic) ICD-10-CM: I48.91 
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 
ICD-9-CM: 250.40, 583.81  8/26/2019 - Present Yes Obesity, morbid ([facility] [address] (Chronic) ICD-10-CM: E66.01 
ICD-9-CM: 278.01  10/19/2018 - Present Yes DM type 2 (diabetes mellitus, type 2) (HCC) (Chronic) ICD-10-CM: E11.9 ICD-9-CM: 250.00  1/14/2013 - Present Yes HLD (hyperlipidemia) (Chronic) ICD-10-CM: B84.5 ICD-9-CM: 272.4  1/14/2013 - Present Yes RESOLVED: Acute-on-chronic kidney injury ([facility] [address] ICD-10-CM: N17.9, N18.9 ICD-9-CM: 584.9, 585.9  2/17/2020 - 2/25/2020 Yes RESOLVED: Atelectasis ICD-10-CM: J98.11 ICD-9-CM: 518.0  2/17/2020 - 2/25/2020 Yes RESOLVED: Fluid overload ICD-10-CM: E87.70 ICD-9-CM: 276.69  2/15/2020 - 2/25/2020 Yes RESOLVED: HCAP (healthcare-associated pneumonia) ICD-10-CM: J18.9 ICD-9-CM: 578  2/14/2020 - 2/25/2020 Yes RESOLVED: Pleural effusion ICD-10-CM: J90 ICD-9-CM: 511.9  2/3/2020 - 2/25/2020 Yes * (Principal) RESOLVED: Acute hypoxemic respiratory failure ([facility] [address] ICD-10-CM: J96.01 
ICD-9-CM: 518.81  1/10/2020 - 2/25/2020 Yes RESOLVED: HTN (hypertension) ICD-10-CM: I10 
ICD-9-CM: 401.9  1/14/2013 - 2/25/2020 Yes Plan: 
 
DC planning/Dispo:   
 -awaiting dispo. CM working on it. Unclear to me why insurance approved rehab transfer initially, but now they will not Acute on chronic hypoxemic respiratory failure 
            Chronically hypoxic, needs home oxygen ESRD on new HD 
            continue HD  
            Nephrology following DVT: 
            Continue warfarin. pharmD dosing 
            noted HIT + last admission  
  
IDDM type 2:  
            cont lantus 40u increase prandial to 10u ISS 
  
Hypotension with baseline HTN  
            Continue midodrine 
  
CAD with 3 vessel CABG and AVR 1/10/20 
            noted, continue warfarin 
  
Atrial fibrillation:   
 Continue warfarin 
  
Depression+anxiety  
            trazodone  
  
Anxiety 
            buspar  
  
BPH Flomax Diet:  DIET DIABETIC CONSISTENT CARB 
DIET NUTRITIONAL SUPPLEMENTS 
DVT ppx:   
 
Signed: 
Cary Lawson MD

## 2020-02-25 NOTE — ROUTINE PROCESS
Verbal bedside report given to Louie Hill oncoming RN. Patient's situation, background, assessment and recommendations provided. Opportunity for questions provided. Oncoming RN assumed care of patient.

## 2020-02-25 NOTE — DIALYSIS
TRANSFER OUT- DIALYSIS Hemodialysis treatment completed without complications. Patient alert and VS stable  BP 98/61  P 82   
 
 3 Kgs removed. Flushed both ports with 10 mL of NS.  CVC dressing clean, dry, and intact, tego caps intact, bilateral lumens wrapped with 4x4 gauze. Patient to room 334 after dialysis.

## 2020-02-26 PROBLEM — Z66 DNR (DO NOT RESUSCITATE): Chronic | Status: ACTIVE | Noted: 2020-01-01

## 2020-02-26 NOTE — ROUTINE PROCESS
Bedside and Verbal shift change report to be given to Morgan Lacy RN (oncoming nurse) by Sheldon Bloch  (offgoing nurse). Report included the following information SBAR, Kardex, Intake/Output and Recent Results.

## 2020-02-26 NOTE — ROUTINE PROCESS
Bedside and Verbal shift change report given to Giovanni Zimmer (oncoming nurse) by Nir Kebede RN (offgoing nurse). Report included the following information SBAR, Kardex, Intake/Output and Recent Results.

## 2020-02-26 NOTE — ROUTINE PROCESS
Verbal bedside report given to vanesa Winters RN. Patient's situation, background, assessment and recommendations provided. Opportunity for questions provided. Oncoming RN assumed care of patient.

## 2020-02-26 NOTE — PROGRESS NOTES
Renal Progress Note Admission Date: 2/13/2020 Subjective:  
Dialysis yesterday with 3 kg UF. Tolerated well. NOt making a lot of urine but more than before. Objective:  
 
Physical Exam:   
Patient Vitals for the past 8 hrs: 
 BP Temp Pulse Resp SpO2 Weight  
02/26/20 1030 110/59  90   112.3 kg (247 lb 9.6 oz) 02/26/20 0850 116/61 97.8 °F (36.6 °C) 80 19 92 %   
02/26/20 0740     97 %   
02/26/20 0517 95/56 98.7 °F (37.1 °C) 86 20 94 %  Gen: comfortable , NAD HEENT: moist membranes CV: S1, S2, regular rate Lungs: Clear bilaterally, no audible wheezing Extem: mild edema, no cyanosis Current Facility-Administered Medications Medication Dose Route Frequency  warfarin (COUMADIN) tablet 6 mg  6 mg Oral QPM  
 insulin lispro (HUMALOG) injection 10 Units  10 Units SubCUTAneous TIDAC  insulin lispro (HUMALOG) injection   SubCUTAneous AC&HS  insulin glargine (LANTUS) injection 30 Units  30 Units SubCUTAneous QHS  albuterol (PROVENTIL VENTOLIN) nebulizer solution 2.5 mg  2.5 mg Nebulization Q6HWA RT  
 polyethylene glycol (MIRALAX) packet 17 g  17 g Oral DAILY  clonazePAM (KlonoPIN) tablet 0.5 mg  0.5 mg Oral QHS PRN  
 traZODone (DESYREL) tablet 100 mg  100 mg Oral QHS  midodrine (PROAMITINE) tablet 10 mg  10 mg Oral TID WITH MEALS  tamsulosin (FLOMAX) capsule 0.4 mg  0.4 mg Oral QHS  busPIRone (BUSPAR) tablet 10 mg  10 mg Oral TID  ALPRAZolam (XANAX) tablet 0.5 mg  0.5 mg Oral QID PRN  
 sodium chloride (NS) flush 5-40 mL  5-40 mL IntraVENous Q8H  
 sodium hypochlorite (QUARTER STRENGTH DAKIN'S) 0.125% irrigation (bottle)   Topical DAILY  loperamide (IMODIUM) capsule 4 mg  4 mg Oral QID PRN  
 sodium chloride 3% hypertonic nebulizer soln  4 mL Nebulization BID RT  
 pantothenic ac-min oil-pet,hyd (AQUAPHOR) 41 % ointment   Topical DAILY  traMADol (ULTRAM) tablet 50 mg  50 mg Oral Q6H PRN  
  sodium chloride (NS) flush 5-40 mL  5-40 mL IntraVENous Q8H  
 sodium chloride (NS) flush 5-40 mL  5-40 mL IntraVENous PRN  
 acetaminophen (TYLENOL) tablet 650 mg  650 mg Oral Q4H PRN  
 naloxone (NARCAN) injection 0.4 mg  0.4 mg IntraVENous PRN  
 ondansetron (ZOFRAN) injection 4 mg  4 mg IntraVENous Q4H PRN  
 bisacodyL (DULCOLAX) tablet 5 mg  5 mg Oral DAILY PRN Data Review:  
 
LABS:  
Recent Results (from the past 12 hour(s)) PROTHROMBIN TIME + INR Collection Time: 02/26/20  5:03 AM  
Result Value Ref Range Prothrombin time 19.4 (H) 12.0 - 14.7 sec INR 1.6 GLUCOSE, POC Collection Time: 02/26/20  6:36 AM  
Result Value Ref Range Glucose (POC) 181 (H) 65 - 100 mg/dL Plan: Active Problems: 
  DM type 2 (diabetes mellitus, type 2) ([facility] [address] (1/14/2013) HLD (hyperlipidemia) (1/14/2013) Obesity, morbid ([facility] [address] (10/19/2018) Type 2 diabetes with nephropathy ([facility] [address] (8/26/2019) CAD (coronary artery disease) (1/10/2020) S/P CABG x 3 (1/10/2020) S/P AVR (1/10/2020) Atrial fibrillation (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 [address] (1/23/2020) Hypotension (2/18/2020) Acute renal failure on dialysis ([facility] [address] (2/25/2020) Probably ESRD - Tolerated dialysis well yesterday- ([facility] schedule) -He is making some urine and theres is a question of whether he is recovering some but his UOP and clearance is not adequate. -HD tomorrow AM and continue to assess for recovery 
  Dyspnea 
  
HIT+ 
  
ASHD s/p CABG 
  
Orthostatic Hypotension - on midodrine

## 2020-02-26 NOTE — PROGRESS NOTES
Care Management Interventions PCP Verified by CM: Yes Mode of Transport at Discharge: BLS(Hien White Spouse [phone] ) Transition of Care Consult (CM Consult): Discharge Planning, SNF Discharge Durable Medical Equipment: No 
Physical Therapy Consult: Yes Occupational Therapy Consult: Yes Speech Therapy Consult: No 
Current Support Network: [address] [place] [address] Confirm Follow Up Transport: Family The Plan for Transition of Care is Related to the Following Treatment Goals : SNF The Patient and/or Patient Representative was Provided with a Choice of Provider and Agrees with the Discharge Plan?: Yes Name of the Patient Representative Who was Provided with a Choice of Provider and Agrees with the Discharge Plan: Pt son Freedom of Choice List was Provided with Basic Dialogue that Supports the Patient's Individualized Plan of Care/Goals, Treatment Preferences and Shares the Quality Data Associated with the Providers?: Yes Discharge Location Discharge Placement: Rehab Unit Subacute Peer to Peer requested by Elta Kanner attending MD- Dr. Dallas Garcia. CM to continue to follow for dc needs.

## 2020-02-26 NOTE — PROGRESS NOTES
Hospitalist Note Admit Date:  2020  3:03 PM  
Name:  Josh Sanchez Age:  69 y.o. 
:  1945 MRN:  [id number] PCP:  Mimi Ridley MD 
Treatment Team: Attending Provider: Mikey Grayson MD; Consulting Provider: Lanie Neil MD; Utilization Review: Carole Bernard, RN; Care Manager: America Soni; Hospitalist: Santiago Harrell NP; Hospitalist: Jeremy Frazier NP; Primary Nurse: Pablo Goldstein, OLYA; Occupational Therapist: Vivi Luna OT; Physical Therapist: Nicole Rooney, PT, DPT 
 
HPI/Subjective:  
Mr. 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 He  Had been  DC one day before on  20 after a prolonged admission due to  CABG and aortic valve replacement complicated with DEJUAN requiring HD, HIT +, DVTs, wound left thig, pneumonia and necrosis of several toes possible related to the use of  Levophed/ HIT.   On    he was at HD and reported dizziness with sitting up and acute onset of SOB. He was sent back to the [facility] SYS showed improving interstitial edema, unchanged left basilar opacity and left pleural effusion. Pt given lasix in ED. Chest CT showed unchanged in general, no PE. He had been discharged on levaquin and this antibiotic was  continued with EOT 20. Pt had a sacral wound present on admission.  Pulmonary consulted.  Had bronch  with findings of no obstruction, small effusion.  Pt has been hypotensive, complicating HD. Started on midodrine for hypotension. At the present time we are  titrating his dose of Midodrine and insulin. Complicated discharge as his insurance has denied him more rehab days.  - pt told nursing he didn't want to be alive.   He denied actual SI to me.  He says he is very depressed. No pain currently. Making some urine. No SOB No other complaints Objective:  
 
Patient Vitals for the past 24 hrs: 
 Temp Pulse Resp BP SpO2  
02/26/20 1030  90  110/59   
02/26/20 0850 97.8 °F (36.6 °C) 80 19 116/61 92 % 02/26/20 0740     97 % 02/26/20 0517 98.7 °F (37.1 °C) 86 20 95/56 94 % 02/26/20 0019 98.5 °F (36.9 °C) 84 19 117/61 94 % 02/25/20 2116 97.9 °F (36.6 °C) 92 20 99/56 93 % 02/25/20 2106     93 % 02/25/20 1828 98 °F (36.7 °C) 92 20 114/61 92 % 02/25/20 1706  94  118/50   
02/25/20 1522     94 % 02/25/20 1157 97.8 °F (36.6 °C) 86 19 106/50 97 % 02/25/20 1112  82  98/61  Oxygen Therapy O2 Sat (%): 92 % (02/26/20 0850) Pulse via Oximetry: 88 beats per minute (02/26/20 0740) O2 Device: Nasal cannula (02/26/20 0800) O2 Flow Rate (L/min): 2 l/min (02/26/20 0800) FIO2 (%): 21 % (02/23/20 1308) Estimated body mass index is 35.53 kg/m² as calculated from the following: 
  Height as of this encounter: 5' 10\" (1.778 m). Weight as of this encounter: 112.3 kg (247 lb 9.6 oz). Intake/Output Summary (Last 24 hours) at 2/26/2020 1100 Last data filed at 2/26/2020 1058 Gross per 24 hour Intake 360 ml Output 3200 ml Net -2840 ml  
   
*Note that automatically entered I/Os may not be accurate; dependent on patient compliance with collection and accurate  by techs. General:    Well nourished. Alert. Lungs:   CTAB. No wheezing, rhonchi, or rales. Extremities: Warm and dry. No cyanosis or edema. Skin:     No rashes or jaundice. Neuro:  No gross focal deficits Data Review: 
I have reviewed all labs, meds, and studies from the last 24 hours: 
 
Recent Results (from the past 24 hour(s)) GLUCOSE, POC Collection Time: 02/25/20 12:03 PM  
Result Value Ref Range Glucose (POC) 118 (H) 65 - 100 mg/dL GLUCOSE, POC Collection Time: 02/25/20  4:00 PM  
Result Value Ref Range Glucose (POC) 228 (H) 65 - 100 mg/dL GLUCOSE, POC Collection Time: 02/25/20  9:23 PM  
Result Value Ref Range Glucose (POC) 205 (H) 65 - 100 mg/dL PROTHROMBIN TIME + INR Collection Time: 02/26/20  5:03 AM  
Result Value Ref Range Prothrombin time 19.4 (H) 12.0 - 14.7 sec INR 1.6 GLUCOSE, POC Collection Time: 02/26/20  6:36 AM  
Result Value Ref Range Glucose (POC) 181 (H) 65 - 100 mg/dL All Micro Results Procedure Component Value Units Date/Time CULTURE, RESPIRATORY/SPUTUM/BRONCH Willard Inwood [[id number]] Collected:  02/17/20 1220 Order Status:  Completed Specimen:  Sputum from Bronchial Washing Updated:  02/24/20 1244 Special Requests: NO SPECIAL REQUESTS     
  GRAM STAIN 20 TO 35 WBCS SEEN PER OIF  
   1 TO 2 EPITHELIAL CELLS SEEN PER OIF  
      
  MODERATE GRAM POSITIVE COCCI  
     
   FEW GRAM POSITIVE RODS Culture result:    
  LIGHT NORMAL RESPIRATORY BRENNAN Current Meds: 
Current Facility-Administered Medications Medication Dose Route Frequency  warfarin (COUMADIN) tablet 6 mg  6 mg Oral QPM  
 insulin lispro (HUMALOG) injection 10 Units  10 Units SubCUTAneous TIDAC  insulin lispro (HUMALOG) injection   SubCUTAneous AC&HS  insulin glargine (LANTUS) injection 30 Units  30 Units SubCUTAneous QHS  albuterol (PROVENTIL VENTOLIN) nebulizer solution 2.5 mg  2.5 mg Nebulization Q6HWA RT  
 polyethylene glycol (MIRALAX) packet 17 g  17 g Oral DAILY  clonazePAM (KlonoPIN) tablet 0.5 mg  0.5 mg Oral QHS PRN  
 traZODone (DESYREL) tablet 100 mg  100 mg Oral QHS  midodrine (PROAMITINE) tablet 10 mg  10 mg Oral TID WITH MEALS  tamsulosin (FLOMAX) capsule 0.4 mg  0.4 mg Oral QHS  busPIRone (BUSPAR) tablet 10 mg  10 mg Oral TID  ALPRAZolam (XANAX) tablet 0.5 mg  0.5 mg Oral QID PRN  
 sodium chloride (NS) flush 5-40 mL  5-40 mL IntraVENous Q8H  
 sodium hypochlorite (QUARTER STRENGTH DAKIN'S) 0.125% irrigation (bottle)   Topical DAILY  loperamide (IMODIUM) capsule 4 mg  4 mg Oral QID PRN  
 sodium chloride 3% hypertonic nebulizer soln  4 mL Nebulization BID RT  
 pantothenic ac-min oil-pet,hyd (AQUAPHOR) 41 % ointment   Topical DAILY  traMADol (ULTRAM) tablet 50 mg  50 mg Oral Q6H PRN  
 sodium chloride (NS) flush 5-40 mL  5-40 mL IntraVENous Q8H  
 sodium chloride (NS) flush 5-40 mL  5-40 mL IntraVENous PRN  
 acetaminophen (TYLENOL) tablet 650 mg  650 mg Oral Q4H PRN  
 naloxone (NARCAN) injection 0.4 mg  0.4 mg IntraVENous PRN  
 ondansetron (ZOFRAN) injection 4 mg  4 mg IntraVENous Q4H PRN  
 bisacodyL (DULCOLAX) tablet 5 mg  5 mg Oral DAILY PRN Other Studies: No results found for this visit on 02/13/20. No results found. Assessment and Plan:  
 
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 
ICD-9-CM: 250.40, 583.81  8/26/2019 - Present Yes Obesity, morbid ([address] (Chronic) ICD-10-CM: E66.01 
ICD-9-CM: 278.01  10/19/2018 - Present Yes DM type 2 (diabetes mellitus, type 2) (HCC) (Chronic) ICD-10-CM: E11.9 ICD-9-CM: 250.00  1/14/2013 - Present Yes HLD (hyperlipidemia) (Chronic) ICD-10-CM: G66.3 ICD-9-CM: 272.4  1/14/2013 - Present Yes RESOLVED: Acute-on-chronic kidney injury ([facility] ICD-10-CM: N17.9, N18.9 ICD-9-CM: 584.9, 585.9  2/17/2020 - 2/25/2020 Yes RESOLVED: Atelectasis ICD-10-CM: J98.11 ICD-9-CM: 518.0  2/17/2020 - 2/25/2020 Yes RESOLVED: Fluid overload ICD-10-CM: E87.70 ICD-9-CM: 276.69  2/15/2020 - 2/25/2020 Yes RESOLVED: HCAP (healthcare-associated pneumonia) ICD-10-CM: J18.9 ICD-9-CM: 262  2/14/2020 - 2/25/2020 Yes RESOLVED: Pleural effusion ICD-10-CM: J90 ICD-9-CM: 511.9  2/3/2020 - 2/25/2020 Yes * (Principal) RESOLVED: Acute hypoxemic respiratory failure ([facility] ICD-10-CM: J96.01 
ICD-9-CM: 518.81  1/10/2020 - 2/25/2020 Yes RESOLVED: HTN (hypertension) ICD-10-CM: I10 
ICD-9-CM: 401.9  1/14/2013 - 2/25/2020 Yes Plan: 
 
DC planning/Dispo:   
 -awaiting dispo. CM working on it. Unclear to me why insurance approved rehab transfer initially, but now they will not Acute on chronic hypoxemic respiratory failure 
            Chronically hypoxic, needs home oxygen ESRD on new HD 
            continue HD  
            Nephrology following Depression Psych to eval today Orthostatic hypotension with symptoms             Continue midodrine IDDM type 2:  
 lantus 40u Prandial 10u ISS 
 
DVT: 
            Continue warfarin. pharmD dosing 
            noted HIT + last admission CAD with 3 vessel CABG and AVR 1/10/20 
            noted, continue warfarin 
  
Atrial fibrillation:   
 Continue warfarin 
  
Depression+anxiety  
            trazodone  
  
Anxiety 
            buspar  
  
BPH Flomax Diet:  DIET DIABETIC CONSISTENT CARB 
DIET NUTRITIONAL SUPPLEMENTS Signed: 
Cary Lawson MD

## 2020-02-26 NOTE — PROGRESS NOTES
Problem: Mobility Impaired (Adult and Pediatric) Goal: *Acute Goals and Plan of Care (Insert Text) Description LTG: 
(1.)Mr. Jose Manuel Roberson will move from supine to sit and sit to supine , scoot up and down and roll side to side with MODIFIED INDEPENDENCE within 7 treatment day(s) from flat surface. (2.)Mr. Jose Manuel Roberson will transfer from bed to chair and chair to bed with MODIFIED INDEPENDENCE using the least restrictive device within 7 treatment day(s). (3.)Mr. Jose Manuel Roberson will ambulate with SUPERVISION for 250+ feet with the least restrictive device within 7 treatment day(s) while maintaining normal vital signs. ____________________________________________________________________________________________ Outcome: Progressing Towards Goal 
  
PHYSICAL THERAPY: Daily Note and AM 2/26/2020 INPATIENT: PT Visit Days : 6 Payor: Joey Onofre / Plan: Erika Young / Product Type: [employer] Medicare /   
  
NAME/AGE/GENDER: Jase Mckeon is a 69 y.o. male PRIMARY DIAGNOSIS: Acute respiratory failure ([facility] [J96.00] Fluid overload [E87.70] Acute hypoxemic respiratory failure (HCC) Acute hypoxemic respiratory failure (HCC) Procedure(s) (LRB): BRONCHOSCOPY (N/A) BRONCHIAL ALVEOLAR LAVAGE (N/A) 9 Days Post-Op ICD-10: Treatment Diagnosis:  
 · Generalized Muscle Weakness (M62.81) · Difficulty in walking, Not elsewhere classified (R26.2) Precaution/Allergies: 
Heparin; Amoxicillin; Keflex [cephalexin]; and Pcn [penicillins] ASSESSMENT:  
 
Mr. Jose Manuel Roberson was supine in bed and agreeable to PT. He came to sitting on EOB with supervision-SBA and intact sitting balance. Pt stood with Rajwinder and demonstrated fair-poor standing balance. Pt appeared to become less responsive and pale in complexion. He required modA x 2 for stand to sit on EOB. Pt's BP checked in sitting and standing at 102/49 and 74/40 with RN present.   Pt given Rajwinder for supine position and performed supine exercises. He became tearful about current status. No progress today. This section established at most recent assessment PROBLEM LIST (Impairments causing functional limitations): 1. Decreased Strength 2. Decreased ADL/Functional Activities 3. Decreased Transfer Abilities 4. Decreased Ambulation Ability/Technique 5. Decreased Balance 6. Decreased Activity Tolerance 7. Increased Fatigue 8. Increased Shortness of Breath 9. Edema/Girth INTERVENTIONS PLANNED: (Benefits and precautions of physical therapy have been discussed with the patient.) 1. Balance Exercise 2. Bed Mobility 3. Family Education 4. Gait Training 5. Home Exercise Program (HEP) 6. Neuromuscular Re-education/Strengthening 7. Therapeutic Activites 8. Therapeutic Exercise/Strengthening 9. Transfer Training TREATMENT PLAN: Frequency/Duration: 3 times a week for duration of hospital stay Rehabilitation Potential For Stated Goals: Good REHAB RECOMMENDATIONS (at time of discharge pending progress):   
Placement: It is my opinion, based on this patient's performance to date, that Mr. Alaina Mcnally may benefit from intensive therapy at a [address] [place] [address] after discharge due to the functional deficits listed above that are likely to improve with skilled rehabilitation and concerns that he/she may be unsafe to be unsupervised at home due to a fall risk, safety concerns for transfers and ambulation at home. Equipment: ? To be determined HISTORY:  
History of Present Injury/Illness (Reason for Referral): Mr. 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   Was DC 2/12/20 from CABG and aortic valve replacement complicated by CKD requiring HD, HIT +, DVTs, wound left thigh on wound vac, pneumonia DC on levaquin Q 48 hours last dose 2/11/20. He was DC on 2 L O2 however has been increased to 4L O2 via NC at STR yesterday when he arrived. Today he was at HD and reports dizziness with sitting up and acute onset of SOB. Son at bedside and states pt was doing ok since DC yesterday until today at HD. INR 2.4. CXR showed improving interstitial edema, unchanged left basilar opacity and left pleural effusion. Pt given lasix in ED. Pt is SOB when talking in short sentences. Denies CP, n/v.  Has had diarrhea however is not new since hospitalization. Past Medical History/Comorbidities:  
Mr. Anabel Moreland  has a past medical history of Arthritis, BPH (benign prostatic hyperplasia) (1/14/2013), CAD (coronary artery disease), DM type 2 (diabetes mellitus, type 2) ([facility] (dx 2004), Dyspnea, Gout (1/14/2013), HLD (hyperlipidemia) (1/14/2013), HTN (hypertension), Morbid obesity ([facility] (9/3/14), Psychiatric disorder, Rheumatic fever, Seasonal allergic rhinitis, Severe aortic valve stenosis, Thyroid disease, and Unspecified sleep apnea (2016). Mr. Anabel Moreland  has a past surgical history that includes hx tonsil and adenoidectomy; hx heart catheterization (12/23/2019); hx orthopaedic (Left); hx cataract removal (Bilateral, 2012); and ir insert tunl cvc w port over 5 years (2/4/2020). Social History/Living Environment:  
Home Environment: Private residence # Steps to Enter: 2 Rails to Enter: No 
One/Two Story Residence: One story Living Alone: No 
Support Systems: Spouse/Significant Other/Partner Patient Expects to be Discharged to[de-identified] Rehabilitation facility Current DME Used/Available at Home: Cane, straight, Walker, rolling Tub or Shower Type: Shower Prior Level of Function/Work/Activity: 
Lives with spouse, admit from rehab; has been using RW short distance ambulation, assist ADLs Personal Factors:   
      Sex:  male Age:  69 y.o. Overall Behavior:  agreeable Number of Personal Factors/Comorbidities that affect the Plan of Care: 
CAD, DM, DVTs, age 3+: HIGH COMPLEXITY EXAMINATION:  
Most Recent Physical Functioning:  
Gross Assessment: 
  
         
  
Posture: 
  
Balance: 
Sitting: Intact Standing: Impaired Standing - Static: Fair Standing - Dynamic : Poor Bed Mobility: 
Rolling: Supervision Supine to Sit: Stand-by assistance Sit to Supine: Minimum assistance Scooting: Stand-by assistance Interventions: Safety awareness training;Verbal cues;Manual cues Wheelchair Mobility: 
  
Transfers: 
Sit to Stand: Minimum assistance Stand to Sit: Minimum assistance Gait: 
  
   
  
Body Structures Involved: 1. Heart 2. Lungs 3. Muscles Body Functions Affected: 1. Cardio 2. Respiratory 3. Movement Related Activities and Participation Affected: 1. General Tasks and Demands 2. Mobility 3. Self Care Number of elements that affect the Plan of Care: 4+: HIGH COMPLEXITY CLINICAL PRESENTATION:  
Presentation: Evolving clinical presentation with changing clinical characteristics: MODERATE COMPLEXITY CLINICAL DECISION MAKIN [facility] [address] AM-PAC 6 Clicks Basic Mobility Inpatient Short Form How much difficulty does the patient currently have. .. Unable A Lot A Little None 1. Turning over in bed (including adjusting bedclothes, sheets and blankets)? [] 1   [] 2   [x] 3   [] 4  
2. Sitting down on and standing up from a chair with arms ( e.g., wheelchair, bedside commode, etc.)   [] 1   [] 2   [x] 3   [] 4  
3. Moving from lying on back to sitting on the side of the bed? [] 1   [] 2   [x] 3   [] 4 How much help from another person does the patient currently need. .. Total A Lot A Little None 4. Moving to and from a bed to a chair (including a wheelchair)? [] 1   [] 2   [x] 3   [] 4  
5. Need to walk in hospital room? [] 1   [] 2   [x] 3   [] 4  
6. Climbing 3-5 steps with a railing?    [] 1   [x] 2   [] 3   [] 4  
 © 2007, Trustees of [facility] [employer], under license to [employer]. All rights reserved Score:  Initial: 17 Most Recent: X (Date: -- ) Interpretation of Tool:  Represents activities that are increasingly more difficult (i.e. Bed mobility, Transfers, Gait). Medical Necessity:    
· Patient is expected to demonstrate progress in  
· strength, range of motion, balance, and coordination ·  to  
· decrease assistance required with overall functional mobility, transfers, ambulation · . · Patient demonstrates · good ·  rehab potential due to higher previous functional level. Reason for Services/Other Comments: 
· Patient · continues to require present interventions due to patient's inability to perform bed mobility, transfers, ambulation safely and effectively · . Use of outcome tool(s) and clinical judgement create a POC that gives a: Clear prediction of patient's progress: LOW COMPLEXITY  
  
 
 
 
TREATMENT:  
(In addition to Assessment/Re-Assessment sessions the following treatments were rendered) Pre-treatment Symptoms/Complaints:  \"I get dizzy when I sit up\" Pain: Initial:  
Pain Intensity 1: 0  Post Session: 0 Gait Training ( ):  Gait training to improve and/or restore physical functioning as related to mobility, strength, balance, and coordination. Ambulated   with   using a   and minimal   related to their stance phase and stride length to promote proper body alignment, promote proper body posture, promote proper body mechanics, and promote proper body breathing techniques. Instruction in performance of posture, walker safety to correct overall functional mobility. Therapeutic Activity: (    28 minutes): Therapeutic activities including Bed transfers, STS transfers, and seated EOB acitvities, standing activities, and supine exercises to improve mobility, strength, balance and coordination.   Required min-modA   to promote static and dynamic balance in standing, promote coordination of bilateral, upper extremity(s), lower extremity(s) and safe use of RW for transfers/ambulation. Date: 
2/26/20 Date: 
 Date: 
  
ACTIVITY/EXERCISE AM PM AM PM AM PM  
APs 1 x 20 B Heel slides 1 x 20 B Hip ABD/ADD 1 x 15 B SLR 1 x 10 B       
        
        
        
B = bilateral; AA = active assistive; A = active; P = passive Therapeutic Exercise: (  ):  Exercises per grid below to improve mobility and strength. Required minimal visual and verbal cues to promote proper body posture and promote proper body mechanics. Progressed range and repetitions as indicated. Date: 
2/19/20 Date: 
2/21/20 Date: Activity/Exercise Parameters Parameters Parameters LAQ 1 x 20 B X 20 B Seated marching 1 x 15 B X 15 B APs 2 x 20 B X 20 B Hip abd  X 15 B Braces/Orthotics/Lines/Etc:  
· Nasal cannula Treatment/Session Assessment:   
· Response to Treatment:  See above · Interdisciplinary Collaboration:  
o Physical Therapist 
o Registered Nurse 
o Certified Nursing Assistant/Patient Care Technician · After treatment position/precautions:  
o Supine in bed 
o Bed alarm/tab alert on 
o Bed/Chair-wheels locked 
o Bed in low position 
o Call light within reach 
o RN notified · Compliance with Program/Exercises: Will assess as treatment progresses · Recommendations/Intent for next treatment session: \"Next visit will focus on advancements to more challenging activities\". Total Treatment Duration: PT Patient Time In/Time Out Time In: 1010 Time Out: 1685 Ke Paulson PT, DPT

## 2020-02-26 NOTE — PROGRESS NOTES
Notified Dr. Saint Dolphin, MD that psych consult has been completed and placed in paper chart. MD to review recommendations.

## 2020-02-26 NOTE — PROGRESS NOTES
Warfarin dosing per pharmacist 
 
Michael Horta is a 69 y.o. male. Height: 5' 10\" (177.8 cm)   Weight 103.5 kg (228 lbs) Indication:  AVR and afib Goal INR:  2.5 - 3.5 Home dose:  2.5 mg daily Risk factors or significant drug interactions: amiodarone (dc'd 2/5), Levofloxacin (2/13- 2/20 ), mirtazapine (2/13-2/24), escitalopram (2/19-2/20), trazodone (started 2/24) Other anticoagulants:  N/A Daily Monitoring Date  INR     Warfarin dose HGB              Notes 2/14  2.4  3 mg  8.7 2/15  2.2  4 mg  9.6 2/16  2.1  5 mg  9.8 2/17  2.1  5 mg  9.5 2/18  2.8  2 mg  9.3 2/19  2.5  2 mg  8.7 
2/20  1.9  3 mg   8.8 
2/21  1.9  4 mg  8.6 
2/22  2.1  3 mg  8.5 
2/23  1.9  4 mg  --- 
2/24  1.7  3 mg + 2 mg --- 
2/25  1.7  5 mg  --- 
2/26  1.6  6mg  --- 
 
INR slightly decreased today to 1.6. Will increase dose to 6mg every evening. Of note, on 2/24/20 mirtazapine d/c'd and trazodone started. Continue to monitor daily INRs. Pharmacy will continue to follow. Thank you, [facility] Clinical Pharmacist 
[phone]

## 2020-02-26 NOTE — PROGRESS NOTES
Patient working with PT and RN called into room for orthostatic hypotension. Upon assisting patient back into bed, patient stated \"If I was not too chicken I would kill myself. \". Patient does not have plan for attempt. Notified Dr. Idalmis Escobar MD and orders received for psych consult. MD stated no need for sitter at this time.

## 2020-02-27 NOTE — ROUTINE PROCESS
Bedside and Verbal shift change report given to Giovanni Zimmer 
 (oncoming nurse) by Cecily Blakely RN (offgoing nurse). Report included the following information SBAR, Kardex, Intake/Output and Recent Results.

## 2020-02-27 NOTE — PROGRESS NOTES
Hospitalist Note Admit Date:  2020  3:03 PM  
Name:  More Garcia Age:  69 y.o. 
:  1945 MRN:  [id number] PCP:  Coni Forrester MD 
Treatment Team: Attending Provider: Patrick Faria MD; Consulting Provider: Janelle Ramirez MD; Utilization Review: Sunni Borjas RN; Care Manager: Jesus Odonnell; Hospitalist: Cally Muse NP; Hospitalist: Robel Dang NP; Consulting Provider: Deborah Yin MD; Occupational Therapist: An Vogel OT 
 
HPI/Subjective:  
Mr. 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 He  Had been  DC one day before on  20 after a prolonged admission due to  CABG and aortic valve replacement complicated with DEJUAN requiring HD, HIT +, DVTs, wound left thig, pneumonia and necrosis of several toes possible related to the use of  Levophed/ HIT.   On    he was at HD and reported dizziness with sitting up and acute onset of SOB. He was sent back to the [facility] SYS showed improving interstitial edema, unchanged left basilar opacity and left pleural effusion. Pt given lasix in ED. Chest CT showed unchanged in general, no PE. He had been discharged on levaquin and this antibiotic was  continued with EOT 20. Pt had a sacral wound present on admission.  Pulmonary consulted.  Had bronch  with findings of no obstruction, small effusion.  Pt has been hypotensive, complicating HD. Started on midodrine for hypotension. At the present time we are  titrating his dose of Midodrine and insulin. Complicated discharge as his insurance has denied him more rehab days.   - seen on HD. Feeling better, less depressed. Denies SI. Seems motivated to participate with therapy. No pain, SOB, fevers No other complaints Objective:  
 
Patient Vitals for the past 24 hrs: 
 Temp Pulse Resp BP SpO2  
02/27/20 1229  81  103/54   
02/27/20 1155  82  104/43   
02/27/20 1129  79  (!) 88/46   
02/27/20 1057  78  (!) 89/40   
02/27/20 1033  79  (!) 83/41   
02/27/20 1001  90  105/43   
02/27/20 0931  83  98/40   
02/27/20 0853  86  110/59   
02/27/20 0828 98.1 °F (36.7 °C) 84 16 100/42 93 % 02/27/20 0744     90 % 02/27/20 0515 98.4 °F (36.9 °C) 78 16 105/49 94 % 02/27/20 0131 98.1 °F (36.7 °C) 80 18 109/56 94 % 02/26/20 2118 98 °F (36.7 °C) 86 18 104/49 92 % 02/26/20 2116     96 % 02/26/20 1822 98 °F (36.7 °C) 87 19 92/54 93 % 02/26/20 1348     90 % 02/26/20 1335 98 °F (36.7 °C) 92 18 109/73 94 % Oxygen Therapy O2 Sat (%): 93 % (02/27/20 0828) Pulse via Oximetry: 81 beats per minute (02/27/20 0744) O2 Device: Nasal cannula (02/27/20 0829) O2 Flow Rate (L/min): 3 l/min (02/27/20 0829) FIO2 (%): 21 % (02/23/20 1308) Estimated body mass index is 34.69 kg/m² as calculated from the following: 
  Height as of this encounter: 5' 10\" (1.778 m). Weight as of this encounter: 109.7 kg (241 lb 12.8 oz). Intake/Output Summary (Last 24 hours) at 2/27/2020 1242 Last data filed at 2/27/2020 1229 Gross per 24 hour Intake 360 ml Output 1825 ml Net -1465 ml *Note that automatically entered I/Os may not be accurate; dependent on patient compliance with collection and accurate  by techs. General:    Well nourished. Alert. Lungs:   CTAB. No wheezing, rhonchi, or rales. Extremities: Warm and dry. No cyanosis or edema. Skin:     No rashes or jaundice. Neuro:  No gross focal deficits Data Review: 
I have reviewed all labs, meds, and studies from the last 24 hours: 
 
Recent Results (from the past 24 hour(s)) GLUCOSE, POC Collection Time: 02/26/20  4:23 PM  
Result Value Ref Range Glucose (POC) 315 (H) 65 - 100 mg/dL GLUCOSE, POC  
 Collection Time: 02/26/20  9:34 PM  
Result Value Ref Range Glucose (POC) 299 (H) 65 - 100 mg/dL GLUCOSE, POC Collection Time: 02/27/20  6:12 AM  
Result Value Ref Range Glucose (POC) 204 (H) 65 - 100 mg/dL PROTHROMBIN TIME + INR Collection Time: 02/27/20  6:24 AM  
Result Value Ref Range Prothrombin time 20.4 (H) 12.0 - 14.7 sec INR 1.7 RENAL FUNCTION PANEL Collection Time: 02/27/20  6:24 AM  
Result Value Ref Range Sodium 135 (L) 136 - 145 mmol/L Potassium 4.6 3.5 - 5.1 mmol/L Chloride 100 98 - 107 mmol/L  
 CO2 28 21 - 32 mmol/L Anion gap 7 7 - 16 mmol/L Glucose 186 (H) 65 - 100 mg/dL BUN 55 (H) 8 - 23 MG/DL Creatinine 3.24 (H) 0.8 - 1.5 MG/DL  
 GFR est AA 24 (L) >60 ml/min/1.73m2 GFR est non-AA 20 (L) >60 ml/min/1.73m2 Calcium 7.9 (L) 8.3 - 10.4 MG/DL Phosphorus 4.7 (H) 2.3 - 3.7 MG/DL Albumin 1.7 (L) 3.2 - 4.6 g/dL GLUCOSE, POC Collection Time: 02/27/20 10:40 AM  
Result Value Ref Range Glucose (POC) 182 (H) 65 - 100 mg/dL All Micro Results Procedure Component Value Units Date/Time CULTURE, RESPIRATORY/SPUTUM/BRONCH Whiteface Kain [[id number]] Collected:  02/17/20 1220 Order Status:  Completed Specimen:  Sputum from Bronchial Washing Updated:  02/24/20 1244 Special Requests: NO SPECIAL REQUESTS     
  GRAM STAIN 20 TO 35 WBCS SEEN PER OIF  
   1 TO 2 EPITHELIAL CELLS SEEN PER OIF  
      
  MODERATE GRAM POSITIVE COCCI  
     
   FEW GRAM POSITIVE RODS Culture result:    
  LIGHT NORMAL RESPIRATORY BRENNAN Current Meds: 
Current Facility-Administered Medications Medication Dose Route Frequency  warfarin (COUMADIN) tablet 6 mg  6 mg Oral QPM  
 insulin glargine (LANTUS) injection 40 Units  40 Units SubCUTAneous QHS  insulin lispro (HUMALOG) injection 10 Units  10 Units SubCUTAneous TIDAC  insulin lispro (HUMALOG) injection   SubCUTAneous AC&HS  
  albuterol (PROVENTIL VENTOLIN) nebulizer solution 2.5 mg  2.5 mg Nebulization Q6HWA RT  
 polyethylene glycol (MIRALAX) packet 17 g  17 g Oral DAILY  clonazePAM (KlonoPIN) tablet 0.5 mg  0.5 mg Oral QHS PRN  
 traZODone (DESYREL) tablet 100 mg  100 mg Oral QHS  midodrine (PROAMITINE) tablet 10 mg  10 mg Oral TID WITH MEALS  tamsulosin (FLOMAX) capsule 0.4 mg  0.4 mg Oral QHS  busPIRone (BUSPAR) tablet 10 mg  10 mg Oral TID  ALPRAZolam (XANAX) tablet 0.5 mg  0.5 mg Oral QID PRN  
 sodium chloride (NS) flush 5-40 mL  5-40 mL IntraVENous Q8H  
 loperamide (IMODIUM) capsule 4 mg  4 mg Oral QID PRN  
 sodium chloride 3% hypertonic nebulizer soln  4 mL Nebulization BID RT  
 pantothenic ac-min oil-pet,hyd (AQUAPHOR) 41 % ointment   Topical DAILY  traMADol (ULTRAM) tablet 50 mg  50 mg Oral Q6H PRN  
 sodium chloride (NS) flush 5-40 mL  5-40 mL IntraVENous Q8H  
 sodium chloride (NS) flush 5-40 mL  5-40 mL IntraVENous PRN  
 acetaminophen (TYLENOL) tablet 650 mg  650 mg Oral Q4H PRN  
 naloxone (NARCAN) injection 0.4 mg  0.4 mg IntraVENous PRN  
 ondansetron (ZOFRAN) injection 4 mg  4 mg IntraVENous Q4H PRN  
 bisacodyL (DULCOLAX) tablet 5 mg  5 mg Oral DAILY PRN Other Studies: No results found for this visit on 02/13/20. No results found. Assessment and Plan:  
 
Hospital Problems as of 2/27/2020 Date Reviewed: 2/24/2020 Codes Class Noted - Resolved POA  
 DNR (do not resuscitate) (Chronic) ICD-10-CM: W10 ICD-9-CM: V49.86  2/26/2020 - Present Yes Acute renal failure on dialysis [facility]) ICD-10-CM: N17.9, Z99.2 ICD-9-CM: 584.9, V45.11  2/25/2020 - Present Yes Hypotension (Chronic) ICD-10-CM: I95.9 ICD-9-CM: 458.9  2/18/2020 - Present Yes HIT (heparin-induced thrombocytopenia) (HCC) (Chronic) ICD-10-CM: C61.91 ICD-9-CM: 289.84  1/23/2020 - Present Yes  Atrial fibrillation (HCC) (Chronic) ICD-10-CM: I48.91 
 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 
ICD-9-CM: 250.40, 583.81  8/26/2019 - Present Yes Obesity, morbid ([facility] (Chronic) ICD-10-CM: E66.01 
ICD-9-CM: 278.01  10/19/2018 - Present Yes DM type 2 (diabetes mellitus, type 2) (HCC) (Chronic) ICD-10-CM: E11.9 ICD-9-CM: 250.00  1/14/2013 - Present Yes HLD (hyperlipidemia) (Chronic) ICD-10-CM: A10.2 ICD-9-CM: 272.4  1/14/2013 - Present Yes RESOLVED: Acute-on-chronic kidney injury ([facility] ICD-10-CM: N17.9, N18.9 ICD-9-CM: 584.9, 585.9  2/17/2020 - 2/25/2020 Yes RESOLVED: Atelectasis ICD-10-CM: J98.11 ICD-9-CM: 518.0  2/17/2020 - 2/25/2020 Yes RESOLVED: Fluid overload ICD-10-CM: E87.70 ICD-9-CM: 276.69  2/15/2020 - 2/25/2020 Yes RESOLVED: HCAP (healthcare-associated pneumonia) ICD-10-CM: J18.9 ICD-9-CM: 488  2/14/2020 - 2/25/2020 Yes RESOLVED: Pleural effusion ICD-10-CM: J90 ICD-9-CM: 511.9  2/3/2020 - 2/25/2020 Yes * (Principal) RESOLVED: Acute hypoxemic respiratory failure ([address] ICD-10-CM: J96.01 
ICD-9-CM: 518.81  1/10/2020 - 2/25/2020 Yes RESOLVED: HTN (hypertension) ICD-10-CM: I10 
ICD-9-CM: 401.9  1/14/2013 - 2/25/2020 Yes Plan: 
 
DC planning/Dispo:   
 -awaiting dispo. CM working on it. Spoke with insurance physician in [address] [place] [address] yesterday. Resubmitted info 
 -if insurance continues to decline placement, then will need to discharge with home health as there really isn't any other option Acute on chronic hypoxemic respiratory failure 
            Chronically hypoxic, needs home oxygen ESRD on new HD 
            continue HD  
             Nephrology following Orthostatic hypotension with symptoms             Continue midodrine IDDM type 2:  
 lantus 40u, increase to 45 Prandial 10u, increase to 15 ISS 
 
DVT: 
            Continue warfarin. pharmD dosing 
            noted HIT + last admission CAD with 3 vessel CABG and AVR 1/10/20 
            noted, continue warfarin 
  
Atrial fibrillation:   
 Continue warfarin 
  
Depression+anxiety  
            trazodone  
  
Anxiety 
            buspar  
  
BPH Flomax Diet:  DIET DIABETIC CONSISTENT CARB 
DIET NUTRITIONAL SUPPLEMENTS Signed: 
Kalpana Sidhu MD

## 2020-02-27 NOTE — PROGRESS NOTES
Warfarin dosing per pharmacist 
 
Ruba Richardson is a 69 y.o. male. Height: 5' 10\" (177.8 cm)   Weight 103.5 kg (228 lbs) Indication:  AVR and afib Goal INR:  2.5 - 3.5 Home dose:  2.5 mg daily Risk factors or significant drug interactions: amiodarone (dc'd 2/5), Levofloxacin (2/13- 2/20 ), mirtazapine (2/13-2/24), escitalopram (2/19-2/20), trazodone (started 2/24) Other anticoagulants:  N/A Daily Monitoring Date  INR     Warfarin dose HGB              Notes 2/14  2.4  3 mg  8.7 2/15  2.2  4 mg  9.6 2/16  2.1  5 mg  9.8 2/17  2.1  5 mg  9.5 2/18  2.8  2 mg  9.3 2/19  2.5  2 mg  8.7 
2/20  1.9  3 mg   8.8 
2/21  1.9  4 mg  8.6 
2/22  2.1  3 mg  8.5 
2/23  1.9  4 mg  --- 
2/24  1.7  3 mg + 2 mg --- 
2/25  1.7  5 mg  --- 
2/26  1.6  6mg  --- 
2/27  1.7  6 mg   --- 
 
INR sub-therapeutic at 1.7. Will continue 6mg every evening and consider increase tomorrow if INR remains below goal. 
 
Of note, on 2/24/20 mirtazapine d/c'd and trazodone started. Continue to monitor daily INRs. Pharmacy will continue to follow. Thank you, Julieth Cheema, PharmD, BCCCP  [facility] [email or web address]

## 2020-02-27 NOTE — PROGRESS NOTES
Renal Progress Note Admission Date: 2/13/2020 Subjective:  
  
Patient seen and examined on HD. His appetite sounds very good. Ate a good breakfast this am.   
 
 
ROS:  
Complete 12-pint ROS reviewed and negative except pertinent positives above in HPI. Objective:  
 
Physical Exam:   
Patient Vitals for the past 8 hrs: 
 BP Temp Pulse Resp SpO2 Weight  
02/27/20 0853 110/59  86     
02/27/20 0828 100/42 98.1 °F (36.7 °C) 84 16 93 %   
02/27/20 0744     90 %   
02/27/20 0515 105/49 98.4 °F (36.9 °C) 78 16 94 %   
02/27/20 0500      109.7 kg (241 lb 12.8 oz) 02/27/20 0131 109/56 98.1 °F (36.7 °C) 80 18 94 %  Gen: comfortable , NAD HEENT: moist membranes CV: S1, S2, regular rate Lungs: Clear bilaterally, no audible wheezing Extem: mild edema, no cyanosis Current Facility-Administered Medications Medication Dose Route Frequency  warfarin (COUMADIN) tablet 6 mg  6 mg Oral QPM  
 insulin glargine (LANTUS) injection 40 Units  40 Units SubCUTAneous QHS  insulin lispro (HUMALOG) injection 10 Units  10 Units SubCUTAneous TIDAC  insulin lispro (HUMALOG) injection   SubCUTAneous AC&HS  
 albuterol (PROVENTIL VENTOLIN) nebulizer solution 2.5 mg  2.5 mg Nebulization Q6HWA RT  
 polyethylene glycol (MIRALAX) packet 17 g  17 g Oral DAILY  clonazePAM (KlonoPIN) tablet 0.5 mg  0.5 mg Oral QHS PRN  
 traZODone (DESYREL) tablet 100 mg  100 mg Oral QHS  midodrine (PROAMITINE) tablet 10 mg  10 mg Oral TID WITH MEALS  tamsulosin (FLOMAX) capsule 0.4 mg  0.4 mg Oral QHS  busPIRone (BUSPAR) tablet 10 mg  10 mg Oral TID  ALPRAZolam (XANAX) tablet 0.5 mg  0.5 mg Oral QID PRN  
 sodium chloride (NS) flush 5-40 mL  5-40 mL IntraVENous Q8H  
 loperamide (IMODIUM) capsule 4 mg  4 mg Oral QID PRN  
 sodium chloride 3% hypertonic nebulizer soln  4 mL Nebulization BID RT  
 pantothenic ac-min oil-pet,hyd (AQUAPHOR) 41 % ointment   Topical DAILY  traMADol (ULTRAM) tablet 50 mg  50 mg Oral Q6H PRN  
 sodium chloride (NS) flush 5-40 mL  5-40 mL IntraVENous Q8H  
 sodium chloride (NS) flush 5-40 mL  5-40 mL IntraVENous PRN  
 acetaminophen (TYLENOL) tablet 650 mg  650 mg Oral Q4H PRN  
 naloxone (NARCAN) injection 0.4 mg  0.4 mg IntraVENous PRN  
 ondansetron (ZOFRAN) injection 4 mg  4 mg IntraVENous Q4H PRN  
 bisacodyL (DULCOLAX) tablet 5 mg  5 mg Oral DAILY PRN Data Review:  
 
LABS:  
Recent Results (from the past 12 hour(s)) GLUCOSE, POC Collection Time: 02/26/20  9:34 PM  
Result Value Ref Range Glucose (POC) 299 (H) 65 - 100 mg/dL GLUCOSE, POC Collection Time: 02/27/20  6:12 AM  
Result Value Ref Range Glucose (POC) 204 (H) 65 - 100 mg/dL PROTHROMBIN TIME + INR Collection Time: 02/27/20  6:24 AM  
Result Value Ref Range Prothrombin time 20.4 (H) 12.0 - 14.7 sec INR 1.7 RENAL FUNCTION PANEL Collection Time: 02/27/20  6:24 AM  
Result Value Ref Range Sodium 135 (L) 136 - 145 mmol/L Potassium 4.6 3.5 - 5.1 mmol/L Chloride 100 98 - 107 mmol/L  
 CO2 28 21 - 32 mmol/L Anion gap 7 7 - 16 mmol/L Glucose 186 (H) 65 - 100 mg/dL BUN 55 (H) 8 - 23 MG/DL Creatinine 3.24 (H) 0.8 - 1.5 MG/DL  
 GFR est AA 24 (L) >60 ml/min/1.73m2 GFR est non-AA 20 (L) >60 ml/min/1.73m2 Calcium 7.9 (L) 8.3 - 10.4 MG/DL Phosphorus 4.7 (H) 2.3 - 3.7 MG/DL Albumin 1.7 (L) 3.2 - 4.6 g/dL Plan: Active Problems: 
  DM type 2 (diabetes mellitus, type 2) ([facility] (1/14/2013) HLD (hyperlipidemia) (1/14/2013) Obesity, morbid ([facility] (10/19/2018) Type 2 diabetes with nephropathy ([facility] (8/26/2019) CAD (coronary artery disease) (1/10/2020) S/P CABG x 3 (1/10/2020) S/P AVR (1/10/2020) Atrial fibrillation ([address] (1/13/2020) HIT (heparin-induced thrombocytopenia) ([facility] (1/23/2020) Hypotension (2/18/2020) Acute renal failure on dialysis ([address] (2/25/2020) DNR (do not resuscitate) (2/26/2020) Probably ESRD - no evidence of renal recovery - ([facility] schedule) 
  
HIT/SHARON + 
  
ASHD s/p CABG 
  
Orthostatic Hypotension - on midodrine Seen on HD with BP of 96/63 and HR of 86. Qb of 350 mL/min via TCC.

## 2020-02-27 NOTE — PROGRESS NOTES
Care Management Interventions PCP Verified by CM: Yes Mode of Transport at Discharge: BLS(Hien White Spouse [phone] ) Transition of Care Consult (CM Consult): Discharge Planning, SNF Discharge Durable Medical Equipment: No 
Physical Therapy Consult: Yes Occupational Therapy Consult: Yes Speech Therapy Consult: No 
Current Support Network: [address] [place] [address] Confirm Follow Up Transport: Family The Plan for Transition of Care is Related to the Following Treatment Goals : SNF The Patient and/or Patient Representative was Provided with a Choice of Provider and Agrees with the Discharge Plan?: Yes Name of the Patient Representative Who was Provided with a Choice of Provider and Agrees with the Discharge Plan: Pt son Freedom of Choice List was Provided with Basic Dialogue that Supports the Patient's Individualized Plan of Care/Goals, Treatment Preferences and Shares the Quality Data Associated with the Providers?: Yes Discharge Location Discharge Placement: Rehab Unit Subacute Long discussion with patient son regarding Denial from [facility]. Pt son concerned about pt's ability to return home. CM supervisor aware. Appeal # [phone] CM to continue to follow for dc needs.

## 2020-02-27 NOTE — PROGRESS NOTES
Nutrition Assessment for:  
Best Practice Alert for Pressure Injury stage 2 or greater 
  
Assessment: Admitted with A/chronic hypoxic respiratory failure/pna, DVT, CAD s/p CABG s/p AVR, HTN, DM, ESRD on HD.  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  
Patient was seen in follow-up today eating lunch. He states he is \"doing about the same as yesterday. \" He states that his appetite is improving. When RD mentioned that he was eating well today and it was a dialysis day, he states that PO on dialysis days has also improved. Observed nearly all of noon meal consumed. He states that he had pancakes this am and ate nearly all of it before HD. He also reports nearly all of dinner last evening and is drinking Nepro TID. Skin Status per Wound Care:  
coccyx gluteal cleft wound - improving per wound care, full thickness slough and mild erythema surrounding, demarcating, moderate amount of serosanguinous drainage Left thigh upper wound is one of the vein harvest sites, from CABG - also improving per wound care DIET DIABETIC CONSISTENT CARB Regular; 2 GM NA (House Low NA) DIET Nepro with all meals   Anthropometrics: 
Height: 5' 10\" (177.8 cm), Weight: 99.8 kg (220 lb 0.3 oz), Weight Source: Bed, Body mass index is 31.57 kg/m². BMI class of obese. His weight has fluctuated per EMR review but is likely associated with start of HD, fluid status an/or poor po intake.  
Macronutrient needs: (using IBW (Ideal body weight) 75.5 kg) [id number] MOGF/HNY (25-30 kcal/kg) EPR:  g/day (1.2-1.4 g/kg) 
     
Nutrition Intervention: 
Meals and snacks: Continue current diet, provide chopped meats as needed per pt request. 
Medical food supplement therapy: Decrease Nepro to 1 x per day - patient prefers berry as patient has improved appetite and is meeting majority of needs with intake of meals. Discharge Nutrition Plan: Too soon to determine. [address] [place] [address], [address]

## 2020-02-27 NOTE — ROUTINE PROCESS
Bedside and Verbal shift change report given to self (oncoming nurse) by Sandrita Kyle (offgoing nurse). Report included the following information SBAR, Kardex, MAR and Recent Results. tere Navarro at bedside

## 2020-02-27 NOTE — ROUTINE PROCESS
Bedside and Verbal shift change report to be given to Collette Ellison, RN (oncoming nurse) by [id number] ARIK Zimmer (offgoing nurse). Report included the following information SBAR, Kardex, Intake/Output and Recent Results.

## 2020-02-27 NOTE — DIALYSIS
TRANSFER IN - DIALYSIS Received patient in dialysis unit  from [facility] (unit) for ordered procedure. Consent verified for renal replacement therapy. Patient alert and vital signs stable. /59 P86 Hemodialysis initiated using LPC. Aspirated and flushed both ports without difficulty. Dressing clean, dry and intact. Machine settings per MD order. Will monitor during treatment.

## 2020-02-27 NOTE — DIALYSIS
TRANSFER OUT- DIALYSIS Hemodialysis treatment completed without complications. Patient alert and VS stable  /54  P 81   
 
 1.5 Kgs removed. Flushed both ports with 10 mL of NS.  CVC dressing clean, dry, and intact, tego caps intact, bilateral lumens wrapped with 4x4 gauze. Meds given-None. No units of RBCs given during dialysis. Patient to 334 after dialysis.

## 2020-02-28 NOTE — PROGRESS NOTES
Patient arrived to room 837 from ICU via wound care bed. Patient placed to oxygen via nasal cannula. Dual skin assessment completed with Jack Saucedo RN. Patient oriented to room. Will change dressings per wound care orders.

## 2020-02-28 NOTE — PROGRESS NOTES
Warfarin dosing per pharmacist 
 
Esha Almeida is a 69 y.o. male. Height: 5' 10\" (177.8 cm)   Weight 103.5 kg (228 lbs) Indication:  AVR and afib Goal INR:  2.5 - 3.5 Home dose:  2.5 mg daily Risk factors or significant drug interactions: amiodarone (dc'd 2/5), Levofloxacin (2/13- 2/20 ), mirtazapine (2/13-2/24), escitalopram (2/19-2/20), trazodone (started 2/24) Other anticoagulants:  N/A Daily Monitoring Date  INR     Warfarin dose HGB              Notes 2/14  2.4  3 mg  8.7 2/15  2.2  4 mg  9.6 2/16  2.1  5 mg  9.8 2/17  2.1  5 mg  9.5 2/18  2.8  2 mg  9.3 2/19  2.5  2 mg  8.7 
2/20  1.9  3 mg   8.8 
2/21  1.9  4 mg  8.6 
2/22  2.1  3 mg  8.5 
2/23  1.9  4 mg  --- 
2/24  1.7  3 mg + 2 mg --- 
2/25  1.7  5 mg  --- 
2/26  1.6  6mg  --- 
2/27  1.7  6 mg   --- 
2/28  1.7  7.5 mg  --- 
 
INR sub-therapeutic at 1.7. Will increase the dose to 7.5 mg every evening for now. Pharmacy will monitor closely as this dose is higher then the patient's historical dose. Of note, on 2/24/20 mirtazapine d/c'd and trazodone started. Continue to monitor daily INRs. Pharmacy will continue to follow. Thank you, Caren Gutierrez, PharmD, [facility] Clinical Pharmacy Specialist

## 2020-02-28 NOTE — ROUTINE PROCESS
Verbal bedside report given to Dessa Meigs and MichaelSierraville, oncoming RN. Patient's situation, background, assessment and recommendations provided. Opportunity for questions provided. Oncoming RN assumed care of patient. Sitter in room

## 2020-02-28 NOTE — PROGRESS NOTES
Hospitalist Note Admit Date:  2020  3:03 PM  
Name:  Rosaura Macario Age:  69 y.o. 
:  1945 MRN:  [id number] PCP:  Sarah Valentine MD 
Treatment Team: Attending Provider: Lester Avendaño MD; Consulting Provider: Deshawn Delgado MD; Utilization Review: Daren Coleman RN; Care Manager: Bridget Doll; Hospitalist: Sindy Pugh NP; Hospitalist: Harrison Pinzon NP; Consulting Provider: Deborah Yin MD; Primary Nurse: Margret Landaverde Primary Nurse: Chris Fierro, OLYA; Occupational Therapist: Cris Mccartney OT; Physical Therapist: India Childs DPT 
 
HPI/Subjective:  
 
 
Mr. 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 CXR showed interstitial edema/ left pleural effusion. CTA chest no PE. He completed a course of levaquin 20. He was seen by pulmonary s/p bronch 20. He had some hypotension started on midodrine. He had worsening depression, seen by tele psyche for suicidal ideation with sitter that resolved. Discharge plans pending due to recurrent insurance denials. 20 feels better, not suicidal, not short of breath, eating better, moving around well,  
 
Objective:  
 
Patient Vitals for the past 24 hrs: 
 Temp Pulse Resp BP SpO2  
20 0853     93 % 20 0803 98.1 °F (36.7 °C) 81 17 113/54 96 % 20 0102 98.1 °F (36.7 °C) 80 22 97/59 95 % 20 2101     96 % 20 2041 98 °F (36.7 °C) 89 20 102/51   
20 1708 97.5 °F (36.4 °C) 90 20 100/48 97 % 20 1347 98.9 °F (37.2 °C) 89 18 116/52 97 % 20 1229  81  103/54   
20 1155  82  104/43   
20 1129  79  (!) 88/46  Oxygen Therapy O2 Sat (%): 93 % (20 0853) Pulse via Oximetry: 86 beats per minute (02/28/20 0853) O2 Device: Nasal cannula (02/28/20 0853) O2 Flow Rate (L/min): 3 l/min (02/28/20 0853) FIO2 (%): 21 % (02/23/20 1308) Estimated body mass index is 34.62 kg/m² as calculated from the following: 
  Height as of this encounter: 5' 10\" (1.778 m). Weight as of this encounter: 109.5 kg (241 lb 4.8 oz). Intake/Output Summary (Last 24 hours) at 2/28/2020 1104 Last data filed at 2/28/2020 0110 Gross per 24 hour Intake 237 ml Output 1650 ml Net -1413 ml  
   
*Note that automatically entered I/Os may not be accurate; dependent on patient compliance with collection and accurate  by techs. General:    Well nourished. Alert. No distress CV:   RRR, III/VI VERONICA LUSB,. No edema Lungs:   CTAB. No wheezing, rhonchi, or rales. anterior Abdomen:   Soft, nontender, nondistended. Decreased BS Extremities: Warm and dry Skin:     No rashes or jaundice. Neuro:  No gross focal deficits Data Review: 
I have reviewed all labs, meds, and studies from the last 24 hours: 
 
Recent Results (from the past 24 hour(s)) GLUCOSE, POC Collection Time: 02/27/20  3:56 PM  
Result Value Ref Range Glucose (POC) 292 (H) 65 - 100 mg/dL GLUCOSE, POC Collection Time: 02/27/20  9:06 PM  
Result Value Ref Range Glucose (POC) 271 (H) 65 - 100 mg/dL GLUCOSE, POC Collection Time: 02/28/20  6:21 AM  
Result Value Ref Range Glucose (POC) 106 (H) 65 - 100 mg/dL All Micro Results Procedure Component Value Units Date/Time CULTURE, RESPIRATORY/SPUTUM/BRONCH Troypiter Acharya [[id number]] Collected:  02/17/20 1220 Order Status:  Completed Specimen:  Sputum from Bronchial Washing Updated:  02/24/20 1244 Special Requests: NO SPECIAL REQUESTS     
  GRAM STAIN 20 TO 35 WBCS SEEN PER OIF  
   1 TO 2 EPITHELIAL CELLS SEEN PER OIF  
      
  MODERATE GRAM POSITIVE COCCI  
     
   FEW GRAM POSITIVE RODS Culture result: LIGHT NORMAL RESPIRATORY BRENNAN Current Meds: 
Current Facility-Administered Medications Medication Dose Route Frequency  insulin glargine (LANTUS) injection 45 Units  45 Units SubCUTAneous QHS  insulin lispro (HUMALOG) injection 15 Units  15 Units SubCUTAneous TIDAC  warfarin (COUMADIN) tablet 6 mg  6 mg Oral QPM  
 insulin lispro (HUMALOG) injection   SubCUTAneous AC&HS  
 albuterol (PROVENTIL VENTOLIN) nebulizer solution 2.5 mg  2.5 mg Nebulization Q6HWA RT  
 polyethylene glycol (MIRALAX) packet 17 g  17 g Oral DAILY  clonazePAM (KlonoPIN) tablet 0.5 mg  0.5 mg Oral QHS PRN  
 traZODone (DESYREL) tablet 100 mg  100 mg Oral QHS  midodrine (PROAMITINE) tablet 10 mg  10 mg Oral TID WITH MEALS  tamsulosin (FLOMAX) capsule 0.4 mg  0.4 mg Oral QHS  busPIRone (BUSPAR) tablet 10 mg  10 mg Oral TID  ALPRAZolam (XANAX) tablet 0.5 mg  0.5 mg Oral QID PRN  
 sodium chloride (NS) flush 5-40 mL  5-40 mL IntraVENous Q8H  
 loperamide (IMODIUM) capsule 4 mg  4 mg Oral QID PRN  
 sodium chloride 3% hypertonic nebulizer soln  4 mL Nebulization BID RT  
 pantothenic ac-min oil-pet,hyd (AQUAPHOR) 41 % ointment   Topical DAILY  traMADol (ULTRAM) tablet 50 mg  50 mg Oral Q6H PRN  
 sodium chloride (NS) flush 5-40 mL  5-40 mL IntraVENous Q8H  
 sodium chloride (NS) flush 5-40 mL  5-40 mL IntraVENous PRN  
 acetaminophen (TYLENOL) tablet 650 mg  650 mg Oral Q4H PRN  
 naloxone (NARCAN) injection 0.4 mg  0.4 mg IntraVENous PRN  
 ondansetron (ZOFRAN) injection 4 mg  4 mg IntraVENous Q4H PRN  
 bisacodyL (DULCOLAX) tablet 5 mg  5 mg Oral DAILY PRN Other Studies: No results found for this visit on 02/13/20. No results found. Assessment and Plan:  
 
Hospital Problems as of 2/28/2020 Date Reviewed: 2/24/2020 Codes Class Noted - Resolved POA  
 DNR (do not resuscitate) (Chronic) ICD-10-CM: E64 ICD-9-CM: V49.86  2/26/2020 - Present Yes Acute renal failure on dialysis [facility]) ICD-10-CM: N17.9, Z99.2 ICD-9-CM: 584.9, V45.11  2/25/2020 - Present Yes Hypotension (Chronic) ICD-10-CM: I95.9 ICD-9-CM: 458.9  2/18/2020 - Present Yes HIT (heparin-induced thrombocytopenia) (HCC) (Chronic) ICD-10-CM: W12.68 ICD-9-CM: 289.84  1/23/2020 - Present Yes Atrial fibrillation (HCC) (Chronic) ICD-10-CM: I48.91 
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 
ICD-9-CM: 250.40, 583.81  8/26/2019 - Present Yes Obesity, morbid ([facility] (Chronic) ICD-10-CM: E66.01 
ICD-9-CM: 278.01  10/19/2018 - Present Yes DM type 2 (diabetes mellitus, type 2) (HCC) (Chronic) ICD-10-CM: E11.9 ICD-9-CM: 250.00  1/14/2013 - Present Yes HLD (hyperlipidemia) (Chronic) ICD-10-CM: P12.2 ICD-9-CM: 272.4  1/14/2013 - Present Yes RESOLVED: Acute-on-chronic kidney injury ([facility] ICD-10-CM: N17.9, N18.9 ICD-9-CM: 584.9, 585.9  2/17/2020 - 2/25/2020 Yes RESOLVED: Atelectasis ICD-10-CM: J98.11 ICD-9-CM: 518.0  2/17/2020 - 2/25/2020 Yes RESOLVED: Fluid overload ICD-10-CM: E87.70 ICD-9-CM: 276.69  2/15/2020 - 2/25/2020 Yes RESOLVED: HCAP (healthcare-associated pneumonia) ICD-10-CM: J18.9 ICD-9-CM: 008  2/14/2020 - 2/25/2020 Yes RESOLVED: Pleural effusion ICD-10-CM: J90 ICD-9-CM: 511.9  2/3/2020 - 2/25/2020 Yes * (Principal) RESOLVED: Acute hypoxemic respiratory failure ([facility] ICD-10-CM: J96.01 
ICD-9-CM: 518.81  1/10/2020 - 2/25/2020 Yes RESOLVED: HTN (hypertension) ICD-10-CM: I10 
ICD-9-CM: 401.9  1/14/2013 - 2/25/2020 Yes Plan: · Acute on chronic respiratory failure with hypoxia: 
· Weaning O2 as tolerant · Assess home O2 needs prior to discharge · DEJUAN on dialysis: · Defer to nephrology · Orthostatic hypotension · Continued midodrine · DM2:  
· Continued lantus, pre prandial and SSI · DVT/HIT: 
· Continued coumadin with daily INR · CAD s/p CABG and AVR 1-10-20, AFIB: 
· On coumadin, daily INR · Depression/anxiety: · Stop sitter · Continued buspar, [employer]oz[employer] DC planning/Dispo:  Pending SNF vs Home Diet:  DIET DIABETIC CONSISTENT CARB 
DIET NUTRITIONAL SUPPLEMENTS 
DVT ppx:  Coumadin , INR 1.7 Signed: Rekha Vega MD

## 2020-02-28 NOTE — PROGRESS NOTES
Renal Progress Note Admission Date: 2/13/2020 Subjective:  
  
Patient seen and examined on rounds. ROS:  
Complete 12-pint ROS reviewed and negative except pertinent positives above in HPI. Objective:  
 
Physical Exam:   
Patient Vitals for the past 8 hrs: 
 BP Temp Pulse Resp SpO2 Weight  
02/28/20 0853     93 %   
02/28/20 0803 113/54 98.1 °F (36.7 °C) 81 17 96 %   
02/28/20 0513      109.5 kg (241 lb 4.8 oz) Gen: comfortable , NAD HEENT: moist membranes CV: S1, S2, regular rate Lungs: Clear bilaterally, no audible wheezing Extem: mild edema, no cyanosis Current Facility-Administered Medications Medication Dose Route Frequency  insulin glargine (LANTUS) injection 45 Units  45 Units SubCUTAneous QHS  insulin lispro (HUMALOG) injection 15 Units  15 Units SubCUTAneous TIDAC  warfarin (COUMADIN) tablet 6 mg  6 mg Oral QPM  
 insulin lispro (HUMALOG) injection   SubCUTAneous AC&HS  
 albuterol (PROVENTIL VENTOLIN) nebulizer solution 2.5 mg  2.5 mg Nebulization Q6HWA RT  
 polyethylene glycol (MIRALAX) packet 17 g  17 g Oral DAILY  clonazePAM (KlonoPIN) tablet 0.5 mg  0.5 mg Oral QHS PRN  
 traZODone (DESYREL) tablet 100 mg  100 mg Oral QHS  midodrine (PROAMITINE) tablet 10 mg  10 mg Oral TID WITH MEALS  tamsulosin (FLOMAX) capsule 0.4 mg  0.4 mg Oral QHS  busPIRone (BUSPAR) tablet 10 mg  10 mg Oral TID  ALPRAZolam (XANAX) tablet 0.5 mg  0.5 mg Oral QID PRN  
 sodium chloride (NS) flush 5-40 mL  5-40 mL IntraVENous Q8H  
 loperamide (IMODIUM) capsule 4 mg  4 mg Oral QID PRN  
 sodium chloride 3% hypertonic nebulizer soln  4 mL Nebulization BID RT  
 pantothenic ac-min oil-pet,hyd (AQUAPHOR) 41 % ointment   Topical DAILY  traMADol (ULTRAM) tablet 50 mg  50 mg Oral Q6H PRN  
 sodium chloride (NS) flush 5-40 mL  5-40 mL IntraVENous Q8H  
 sodium chloride (NS) flush 5-40 mL  5-40 mL IntraVENous PRN  
  acetaminophen (TYLENOL) tablet 650 mg  650 mg Oral Q4H PRN  
 naloxone (NARCAN) injection 0.4 mg  0.4 mg IntraVENous PRN  
 ondansetron (ZOFRAN) injection 4 mg  4 mg IntraVENous Q4H PRN  
 bisacodyL (DULCOLAX) tablet 5 mg  5 mg Oral DAILY PRN Data Review:  
 
LABS:  
Recent Results (from the past 12 hour(s)) GLUCOSE, POC Collection Time: 02/28/20  6:21 AM  
Result Value Ref Range Glucose (POC) 106 (H) 65 - 100 mg/dL Plan: Active Problems: 
  DM type 2 (diabetes mellitus, type 2) ([facility] (1/14/2013) HLD (hyperlipidemia) (1/14/2013) [facility], morbid [facility] (10/19/2018) Type 2 diabetes with nephropathy ([facility] (8/26/2019) CAD (coronary artery disease) (1/10/2020) S/P CABG x 3 (1/10/2020) S/P AVR (1/10/2020) Atrial fibrillation ([facility] (1/13/2020) HIT (heparin-induced thrombocytopenia) ([facility] (1/23/2020) Hypotension (2/18/2020) Acute renal failure on dialysis ([facility] (2/25/2020) DNR (do not resuscitate) (2/26/2020) Probably ESRD - no evidence of renal recovery - ([facility] schedule) 
  
HIT/SHARON + 
  
ASHD s/p CABG 
  
Orthostatic Hypotension - on midodrine HD tomorrow.

## 2020-02-28 NOTE — PROGRESS NOTES
Problem: Mobility Impaired (Adult and Pediatric) Goal: *Acute Goals and Plan of Care (Insert Text) Description LTG: 
(1.)Mr. Manjit Rodriguez will move from supine to sit and sit to supine , scoot up and down and roll side to side with MODIFIED INDEPENDENCE within 7 treatment day(s) from flat surface. (2.)Mr. Manjit Rodriguez will transfer from bed to chair and chair to bed with MODIFIED INDEPENDENCE using the least restrictive device within 7 treatment day(s). (3.)Mr. Manjit Rodriguez will ambulate with SUPERVISION for 250+ feet with the least restrictive device within 7 treatment day(s) while maintaining normal vital signs. ____________________________________________________________________________________________ Outcome: Progressing Towards Goal 
  
PHYSICAL THERAPY: Daily Note and AM 2/28/2020 INPATIENT: PT Visit Days : 6 Payor: Elaine Reeder / Plan: Samantha Jean / Product Type: [employer] Medicare /   
  
NAME/AGE/GENDER: Priya Guevara is a 69 y.o. male PRIMARY DIAGNOSIS: Acute respiratory failure [facility] [J96.00] Fluid overload [E87.70] Acute hypoxemic respiratory failure (HCC) Acute hypoxemic respiratory failure (HCC) Procedure(s) (LRB): BRONCHOSCOPY (N/A) BRONCHIAL ALVEOLAR LAVAGE (N/A) 11 Days Post-Op ICD-10: Treatment Diagnosis:  
 · Generalized Muscle Weakness (M62.81) · Difficulty in walking, Not elsewhere classified (R26.2) Precaution/Allergies: 
Heparin; Amoxicillin; Keflex [cephalexin]; and Pcn [penicillins] ASSESSMENT:  
 
Mr. Manjit Rodriguez was sitting at EOB and agreeable to PT.  BP taken sitting was 90/51 with pt stating he normally has low BP. He also was required to sit in chair and refusing to walk into hallway. Pt stood with CGA 1 time and said he wanted to get to chair, but sat on bed due to feeling a little lightheaded. Waited several minutes then he was more comfortable and ambulated over to chair.   He was in chair for about 10min and took BP 87/50. Reported to pt that getting BTB may be safer than sitting in chair due to low BP. He agreed to return supine in bed and did with CGA. Pt has desire to improve and walk, but BP is very low and needed to return BTB to be safe. No improvement today due to low BP. This section established at most recent assessment PROBLEM LIST (Impairments causing functional limitations): 1. Decreased Strength 2. Decreased ADL/Functional Activities 3. Decreased Transfer Abilities 4. Decreased Ambulation Ability/Technique 5. Decreased Balance 6. Decreased Activity Tolerance 7. Increased Fatigue 8. Increased Shortness of Breath 9. Edema/Girth INTERVENTIONS PLANNED: (Benefits and precautions of physical therapy have been discussed with the patient.) 1. Balance Exercise 2. Bed Mobility 3. Family Education 4. Gait Training 5. Home Exercise Program (HEP) 6. Neuromuscular Re-education/Strengthening 7. Therapeutic Activites 8. Therapeutic Exercise/Strengthening 9. Transfer Training TREATMENT PLAN: Frequency/Duration: 3 times a week for duration of hospital stay Rehabilitation Potential For Stated Goals: Good REHAB RECOMMENDATIONS (at time of discharge pending progress):   
Placement: It is my opinion, based on this patient's performance to date, that Mr. Yoana Mooer may benefit from intensive therapy at a [address] [place] [address] after discharge due to the functional deficits listed above that are likely to improve with skilled rehabilitation and concerns that he/she may be unsafe to be unsupervised at home due to a fall risk, safety concerns for transfers and ambulation at home. Equipment: ? To be determined HISTORY:  
History of Present Injury/Illness (Reason for Referral): Mr. Yoana Moore is a 68 yo male with PMH of DM II, HTN, CAD s/p CABG, s/p aortic valve repair, JOAQUIM on bipap, multiple DVTs on coumadin, new HD pt, HIT +, necrosis of toes/fingers from levophed who presented from HD with c/o acute sudden onset of SOB. Was DC 2/12/20 from CABG and aortic valve replacement complicated by CKD requiring HD, HIT +, DVTs, wound left thigh on wound vac, pneumonia DC on levaquin Q 48 hours last dose 2/11/20. He was DC on 2 L O2 however has been increased to 4L O2 via NC at STR yesterday when he arrived. Today he was at HD and reports dizziness with sitting up and acute onset of SOB. Son at bedside and states pt was doing ok since DC yesterday until today at HD. INR 2.4. CXR showed improving interstitial edema, unchanged left basilar opacity and left pleural effusion. Pt given lasix in ED. Pt is SOB when talking in short sentences. Denies CP, n/v.  Has had diarrhea however is not new since hospitalization. Past Medical History/Comorbidities:  
Mr. Charmaine Tavarez  has a past medical history of Arthritis, BPH (benign prostatic hyperplasia) (1/14/2013), CAD (coronary artery disease), DM type 2 (diabetes mellitus, type 2) ([facility] (dx 2004), Dyspnea, Gout (1/14/2013), HLD (hyperlipidemia) (1/14/2013), HTN (hypertension), Morbid obesity ([facility] (9/3/14), Psychiatric disorder, Rheumatic fever, Seasonal allergic rhinitis, Severe aortic valve stenosis, Thyroid disease, and Unspecified sleep apnea (2016). Mr. Charmaine Tavarez  has a past surgical history that includes hx tonsil and adenoidectomy; hx heart catheterization (12/23/2019); hx orthopaedic (Left); hx cataract removal (Bilateral, 2012); and ir insert tunl cvc w port over 5 years (2/4/2020). Social History/Living Environment:  
Home Environment: Private residence # Steps to Enter: 2 Rails to Enter: No 
One/Two Story Residence: One story Living Alone: No 
Support Systems: Spouse/Significant Other/Partner Patient Expects to be Discharged to[de-identified] Rehabilitation facility Current DME Used/Available at Home: Cane, straight, Walker, rolling Tub or Shower Type: Shower Prior Level of Function/Work/Activity: 
Lives with spouse, admit from rehab; has been using RW short distance ambulation, assist ADLs Personal Factors:   
      Sex:  male Age:  69 y.o. Overall Behavior:  agreeable Number of Personal Factors/Comorbidities that affect the Plan of Care: 
CAD, DM, DVTs, age 3+: HIGH COMPLEXITY EXAMINATION:  
Most Recent Physical Functioning:  
Gross Assessment: 
  
         
  
Posture: 
  
Balance: 
Sitting: Intact Standing: Impaired Standing - Static: Fair Standing - Dynamic : Fair Bed Mobility: 
Scooting: Stand-by assistance Wheelchair Mobility: 
  
Transfers: 
Sit to Stand: Contact guard assistance Stand to Sit: Contact guard assistance Gait: 
  
Distance (ft): 6 Feet (ft) Assistive Device: Walker, rolling Ambulation - Level of Assistance: Contact guard assistance Body Structures Involved: 1. Heart 2. Lungs 3. Muscles Body Functions Affected: 1. Cardio 2. Respiratory 3. Movement Related Activities and Participation Affected: 1. General Tasks and Demands 2. Mobility 3. Self Care Number of elements that affect the Plan of Care: 4+: HIGH COMPLEXITY CLINICAL PRESENTATION:  
Presentation: Evolving clinical presentation with changing clinical characteristics: MODERATE COMPLEXITY CLINICAL DECISION MAKIN[address] [place] [address] 6 Clicks Basic Mobility Inpatient Short Form How much difficulty does the patient currently have. .. Unable A Lot A Little None 1. Turning over in bed (including adjusting bedclothes, sheets and blankets)? [] 1   [] 2   [x] 3   [] 4  
2. Sitting down on and standing up from a chair with arms ( e.g., wheelchair, bedside commode, etc.)   [] 1   [] 2   [x] 3   [] 4  
3. Moving from lying on back to sitting on the side of the bed? [] 1   [] 2   [x] 3   [] 4 How much help from another person does the patient currently need. .. Total A Lot A Little None 4. Moving to and from a bed to a chair (including a wheelchair)? [] 1   [] 2   [x] 3   [] 4  
5. Need to walk in hospital room? [] 1   [] 2   [x] 3   [] 4  
6. Climbing 3-5 steps with a railing? [] 1   [x] 2   [] 3   [] 4  
© 2007, Trustees of [facility] [employer], under license to [employer]. All rights reserved Score:  Initial: 17 Most Recent: X (Date: -- ) Interpretation of Tool:  Represents activities that are increasingly more difficult (i.e. Bed mobility, Transfers, Gait). Medical Necessity:    
· Patient is expected to demonstrate progress in  
· strength, range of motion, balance, and coordination ·  to  
· decrease assistance required with overall functional mobility, transfers, ambulation · . · Patient demonstrates · good ·  rehab potential due to higher previous functional level. Reason for Services/Other Comments: 
· Patient · continues to require present interventions due to patient's inability to perform bed mobility, transfers, ambulation safely and effectively · . Use of outcome tool(s) and clinical judgement create a POC that gives a: Clear prediction of patient's progress: LOW COMPLEXITY  
  
 
 
 
TREATMENT:  
(In addition to Assessment/Re-Assessment sessions the following treatments were rendered) Pre-treatment Symptoms/Complaints:  \"I get dizzy when I sit up\" Pain: Initial:  
Pain Intensity 1: 0  Post Session: 0 Gait Training ( ):  Gait training to improve and/or restore physical functioning as related to mobility, strength, balance, and coordination. Ambulated 6 Feet (ft) with Contact guard assistance using a Walker, rolling and minimal   related to their stance phase and stride length to promote proper body alignment, promote proper body posture, promote proper body mechanics, and promote proper body breathing techniques. Instruction in performance of posture, walker safety to correct overall functional mobility. Therapeutic Activity: (    28 minutes): Therapeutic activities including Bed transfers, STS transfers, and seated EOB acitvities, standing activities, and supine exercises to improve mobility, strength, balance and coordination. Required min-modA   to promote static and dynamic balance in standing, promote coordination of bilateral, upper extremity(s), lower extremity(s) and safe use of RW for transfers/ambulation. Date: 
2/26/20 Date: 
 Date: 
  
ACTIVITY/EXERCISE AM PM AM PM AM PM  
APs 1 x 20 B Heel slides 1 x 20 B Hip ABD/ADD 1 x 15 B SLR 1 x 10 B       
        
        
        
B = bilateral; AA = active assistive; A = active; P = passive Therapeutic Exercise: (  ):  Exercises per grid below to improve mobility and strength. Required minimal visual and verbal cues to promote proper body posture and promote proper body mechanics. Progressed range and repetitions as indicated. Date: 
2/19/20 Date: 
2/21/20 Date: Activity/Exercise Parameters Parameters Parameters LAQ 1 x 20 B X 20 B Seated marching 1 x 15 B X 15 B APs 2 x 20 B X 20 B Hip abd  X 15 B Braces/Orthotics/Lines/Etc:  
· Nasal cannula Treatment/Session Assessment:   
· Response to Treatment:  See above · Interdisciplinary Collaboration:  
o Physical Therapist 
o Registered Nurse · After treatment position/precautions:  
o Supine in bed 
o Bed alarm/tab alert on 
o Bed/Chair-wheels locked 
o Bed in low position 
o Call light within reach 
o RN notified · Compliance with Program/Exercises: Will assess as treatment progresses · Recommendations/Intent for next treatment session: \"Next visit will focus on advancements to more challenging activities\". Total Treatment Duration: PT Patient Time In/Time Out Time In: 9900 Time Out: 5215 Ricky Oleary DPT

## 2020-02-28 NOTE — ROUTINE PROCESS
Bedside and Verbal shift change report given to self (oncoming nurse) by Nisha Parents (offgoing nurse). Report included the following information SBAR, Kardex, Intake/Output and MAR. Sitter at bedside.

## 2020-02-28 NOTE — PROGRESS NOTES
TRANSFER - OUT REPORT: 
 
Verbal report given to Martha Rodriguez RN on Jim Gómez  being transferred to [phone] for routine progression of care Report consisted of patients Situation, Background, Assessment and Recommendations(SBAR). Information from the following report(s) SBAR, Kardex, Intake/Output and MAR was reviewed with the receiving nurse. Opportunity for questions and clarification was provided.

## 2020-02-28 NOTE — PROGRESS NOTES
Problem: Pressure Injury - Risk of 
Goal: *Prevention of pressure injury Description Document Alfredo Scale and appropriate interventions in the flowsheet. Outcome: Progressing Towards Goal 
Note: Pressure Injury Interventions: 
Sensory Interventions: Check visual cues for pain, Pressure redistribution bed/mattress (bed type) Moisture Interventions: Absorbent underpads, Limit adult briefs Activity Interventions: Increase time out of bed, Pressure redistribution bed/mattress(bed type) Mobility Interventions: Pressure redistribution bed/mattress (bed type) Nutrition Interventions: Document food/fluid/supplement intake, Offer support with meals,snacks and hydration Friction and Shear Interventions: Apply protective barrier, creams and emollients, Foam dressings/transparent film/skin sealants Problem: Falls - Risk of 
Goal: *Absence of Falls Description Document Litiki Benjamin Fall Risk and appropriate interventions in the flowsheet. Outcome: Progressing Towards Goal 
Note: Fall Risk Interventions: 
Mobility Interventions: Bed/chair exit alarm, Communicate number of staff needed for ambulation/transfer, Patient to call before getting OOB Mentation Interventions: Bed/chair exit alarm, Family/sitter at bedside, Room close to nurse's station, Door open when patient unattended Medication Interventions: Bed/chair exit alarm, Patient to call before getting OOB, Teach patient to arise slowly Elimination Interventions: Bed/chair exit alarm, Call light in reach, Patient to call for help with toileting needs History of Falls Interventions: Bed/chair exit alarm, Door open when patient unattended, Room close to nurse's station Pt on 3L NC. Problem: Breathing Pattern - Ineffective Goal: *Absence of hypoxia Outcome: Progressing Towards Goal

## 2020-02-29 NOTE — PROGRESS NOTES
Warfarin dosing per pharmacist 
 
Yariel Maher is a 69 y.o. male. Height: 5' 10\" (177.8 cm)   Weight 103.5 kg (228 lbs) Indication:  AVR and afib Goal INR:  2.5 - 3.5 Home dose:  2.5 mg daily Risk factors or significant drug interactions: amiodarone (dc'd 2/5), Levofloxacin (2/13- 2/20 ), mirtazapine (2/13-2/24), escitalopram (2/19-2/20), trazodone (started 2/24) Other anticoagulants:  N/A Daily Monitoring Date  INR     Warfarin dose HGB              Notes 2/14  2.4  3 mg  8.7 2/15  2.2  4 mg  9.6 2/16  2.1  5 mg  9.8 2/17  2.1  5 mg  9.5 2/18  2.8  2 mg  9.3 2/19  2.5  2 mg  8.7 
2/20  1.9  3 mg   8.8 
2/21  1.9  4 mg  8.6 
2/22  2.1  3 mg  8.5 
2/23  1.9  4 mg  --- 
2/24  1.7  3 mg + 2 mg --- 
2/25  1.7  5 mg  --- 
2/26  1.6  6mg  --- 
2/27  1.7  6 mg   --- 
2/28  1.7  7.5 mg  --- 
2/29  2.1 ` 6 mg  7.7 INR still subtherapeutic. Will decrease dose to 6 mg this evening based on current trend of INR. Pharmacy will continue to follow patient and monitor INR daily. Thank you, Doretha Cardozo, Pharm. D. 
PGY1 Pharmacy Resident [phone]

## 2020-02-29 NOTE — DIALYSIS
TRANSFER IN - DIALYSIS Received patient in dialysis unit  from room 837 (unit) for ordered procedure. Consent verified for renal replacement therapy. Patient alert and vital signs stable. /63 P 83 Hemodialysis initiated using left perm cath. Aspirated and flushed both ports without difficulty. Dressing clean, dry and intact. Machine settings per MD order. Heparin 0 unit bolus and 0 units/hr. Will monitor during treatment.

## 2020-02-29 NOTE — PROGRESS NOTES
Received bedside shift report from offgoing nurse, Acacia Viera. Patient sitting up in bed at this time, awake, respirations even and unlabored. Denies needs at this time. Bed low and locked. Bedside table, personal belongings and call light within reach.

## 2020-02-29 NOTE — PROGRESS NOTES
Renal Progress Note Admission Date: 2/13/2020 Subjective:  
  
Patient seen and examined on dialysis ROS:  
Complete 12-pint ROS reviewed and negative except pertinent positives above in HPI. Objective:  
 
Physical Exam:   
Patient Vitals for the past 8 hrs: 
 BP Temp Pulse Resp SpO2 Weight  
02/29/20 1229 97/47  82     
02/29/20 1157 92/49  73     
02/29/20 1125 (!) 90/38  73     
02/29/20 1029 101/63  83     
02/29/20 0732 116/54 97.5 °F (36.4 °C) 84 20 94 %   
02/29/20 0619      115.2 kg (253 lb 15.5 oz) Gen: comfortable , NAD HEENT: moist membranes CV: S1, S2, regular rate Lungs: Clear bilaterally, no audible wheezing Extem: mild edema, no cyanosis Current Facility-Administered Medications Medication Dose Route Frequency  warfarin (COUMADIN) tablet 6 mg  6 mg Oral QPM  
 insulin glargine (LANTUS) injection 45 Units  45 Units SubCUTAneous QHS  insulin lispro (HUMALOG) injection 15 Units  15 Units SubCUTAneous TIDAC  insulin lispro (HUMALOG) injection   SubCUTAneous AC&HS  
 albuterol (PROVENTIL VENTOLIN) nebulizer solution 2.5 mg  2.5 mg Nebulization Q6HWA RT  
 polyethylene glycol (MIRALAX) packet 17 g  17 g Oral DAILY  clonazePAM (KlonoPIN) tablet 0.5 mg  0.5 mg Oral QHS PRN  
 traZODone (DESYREL) tablet 100 mg  100 mg Oral QHS  midodrine (PROAMITINE) tablet 10 mg  10 mg Oral TID WITH MEALS  tamsulosin (FLOMAX) capsule 0.4 mg  0.4 mg Oral QHS  busPIRone (BUSPAR) tablet 10 mg  10 mg Oral TID  ALPRAZolam (XANAX) tablet 0.5 mg  0.5 mg Oral QID PRN  
 sodium chloride (NS) flush 5-40 mL  5-40 mL IntraVENous Q8H  
 loperamide (IMODIUM) capsule 4 mg  4 mg Oral QID PRN  
 sodium chloride 3% hypertonic nebulizer soln  4 mL Nebulization BID RT  
 pantothenic ac-min oil-pet,hyd (AQUAPHOR) 41 % ointment   Topical DAILY  traMADol (ULTRAM) tablet 50 mg  50 mg Oral Q6H PRN  
 sodium chloride (NS) flush 5-40 mL  5-40 mL IntraVENous Q8H  
  sodium chloride (NS) flush 5-40 mL  5-40 mL IntraVENous PRN  
 acetaminophen (TYLENOL) tablet 650 mg  650 mg Oral Q4H PRN  
 naloxone (NARCAN) injection 0.4 mg  0.4 mg IntraVENous PRN  
 ondansetron (ZOFRAN) injection 4 mg  4 mg IntraVENous Q4H PRN  
 bisacodyL (DULCOLAX) tablet 5 mg  5 mg Oral DAILY PRN Data Review:  
 
LABS:  
Recent Results (from the past 12 hour(s)) GLUCOSE, POC Collection Time: 02/29/20  5:29 AM  
Result Value Ref Range Glucose (POC) 176 (H) 65 - 100 mg/dL PROTHROMBIN TIME + INR Collection Time: 02/29/20  7:01 AM  
Result Value Ref Range Prothrombin time 23.8 (H) 12.0 - 14.7 sec INR 2.1    
CBC WITH AUTOMATED DIFF Collection Time: 02/29/20  7:01 AM  
Result Value Ref Range WBC 9.0 4.3 - 11.1 K/uL  
 RBC 2.73 (L) 4.23 - 5.6 M/uL HGB 7.7 (L) 13.6 - 17.2 g/dL HCT 25.4 (L) 41.1 - 50.3 % MCV 93.0 79.6 - 97.8 FL  
 MCH 28.2 26.1 - 32.9 PG  
 MCHC 30.3 (L) 31.4 - 35.0 g/dL  
 RDW 14.1 11.9 - 14.6 % PLATELET 603 715 - 364 K/uL MPV 10.0 9.4 - 12.3 FL ABSOLUTE NRBC 0.00 0.0 - 0.2 K/uL  
 DF AUTOMATED NEUTROPHILS 62 43 - 78 % LYMPHOCYTES 28 13 - 44 % MONOCYTES 6 4.0 - 12.0 % EOSINOPHILS 3 0.5 - 7.8 % BASOPHILS 1 0.0 - 2.0 % IMMATURE GRANULOCYTES 0 0.0 - 5.0 %  
 ABS. NEUTROPHILS 5.6 1.7 - 8.2 K/UL  
 ABS. LYMPHOCYTES 2.5 0.5 - 4.6 K/UL  
 ABS. MONOCYTES 0.6 0.1 - 1.3 K/UL  
 ABS. EOSINOPHILS 0.2 0.0 - 0.8 K/UL  
 ABS. BASOPHILS 0.1 0.0 - 0.2 K/UL  
 ABS. IMM. GRANS. 0.0 0.0 - 0.5 K/UL METABOLIC PANEL, BASIC Collection Time: 02/29/20  7:01 AM  
Result Value Ref Range Sodium 137 136 - 145 mmol/L Potassium 4.6 3.5 - 5.1 mmol/L Chloride 103 98 - 107 mmol/L  
 CO2 27 21 - 32 mmol/L Anion gap 7 7 - 16 mmol/L Glucose 129 (H) 65 - 100 mg/dL BUN 46 (H) 8 - 23 MG/DL Creatinine 3.00 (H) 0.8 - 1.5 MG/DL  
 GFR est AA 27 (L) >60 ml/min/1.73m2 GFR est non-AA 22 (L) >60 ml/min/1.73m2 Calcium 7.8 (L) 8.3 - 10.4 MG/DL  
GLUCOSE, POC Collection Time: 02/29/20 10:58 AM  
Result Value Ref Range Glucose (POC) 123 (H) 65 - 100 mg/dL Plan: Active Problems: 
  DM type 2 (diabetes mellitus, type 2) ([facility] [address] (1/14/2013) HLD (hyperlipidemia) (1/14/2013) Obesity, morbid ([email or web address] (10/19/2018) Type 2 diabetes with nephropathy ([email or web address] (8/26/2019) CAD (coronary artery disease) (1/10/2020) S/P CABG x 3 (1/10/2020) S/P AVR (1/10/2020) Atrial fibrillation ([email or web address] (1/13/2020) HIT (heparin-induced thrombocytopenia) ([email or web address] (1/23/2020) Hypotension (2/18/2020) Acute renal failure on dialysis ([email or web address] (2/25/2020) DNR (do not resuscitate) (2/26/2020) Probably ESRD - no evidence of renal recovery - ([facility] schedule) 
  
HIT/SHARON + 
  
ASHD s/p CABG 
  
Orthostatic Hypotension - on midodrine Seen on dialysis, tolerating well. Will check iron stores. Procrit replacement.

## 2020-02-29 NOTE — DIALYSIS
TRANSFER OUT- DIALYSIS Hemodialysis treatment completed without complications. Patient STABLE and VS STABLE  /56  P 65 
  
 
 1600 Kgs removed. Flushed both ports with 10 mL of NS.  CVC dressing clean, dry, and intact, tego caps intact, bilateral lumens wrapped with 4x4 gauze. Patient to 837 
 after dialysis.

## 2020-02-29 NOTE — PROGRESS NOTES
Bedside shift report completed with oncoming nurse, Iris Cho. Patient resting quietly in bed at this time, awake, respirations even and unlabored. Denies needs at this time. Bed low and locked. Bedside table, personal belongings and call light within reach.

## 2020-02-29 NOTE — PROGRESS NOTES
TRANSFER - IN REPORT: 
 
Verbal report received from Fito Gould RN on Ruba Richardson  being received from ICU for routine progression of care Report consisted of patients Situation, Background, Assessment and  
Recommendations(SBAR). Information from the following report(s) SBAR, Intake/Output, MAR, Recent Results and Cardiac Rhythm NSR was reviewed with the receiving nurse. Opportunity for questions and clarification was provided. Assessment completed upon patients arrival to unit and care assumed.

## 2020-02-29 NOTE — PROGRESS NOTES
Foot dressings per order  Left thigh incisions dressed  Damp to dry saline to sacra/buttocks wound and covered with abd and pink foam dressing

## 2020-02-29 NOTE — PROGRESS NOTES
Progress Note Patient: Ovidio Welsh MRN: [id number]  SSN: [id number] YOB: 1945  Age: 69 y.o. Sex: male Admit Date: 2/13/2020 LOS: 14 days Subjective:  
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 CXR showed interstitial edema/ left pleural effusion. CTA chest no PE. He completed a course of levaquin 2-20-20. He was seen by pulmonary s/p bronch 2-17-20. He had some hypotension started on midodrine. He had worsening depression, seen by tele psyche for suicidal ideation with sitter that resolved. Discharge plans pending due to recurrent insurance denials. \" Today, no chest pain or SOB. States he is supposed to go to dialysis today.  
  
Current Facility-Administered Medications Medication Dose Route Frequency  warfarin (COUMADIN) tablet 7.5 mg  7.5 mg Oral QPM  
 insulin glargine (LANTUS) injection 45 Units  45 Units SubCUTAneous QHS  insulin lispro (HUMALOG) injection 15 Units  15 Units SubCUTAneous TIDAC  insulin lispro (HUMALOG) injection   SubCUTAneous AC&HS  
 albuterol (PROVENTIL VENTOLIN) nebulizer solution 2.5 mg  2.5 mg Nebulization Q6HWA RT  
 polyethylene glycol (MIRALAX) packet 17 g  17 g Oral DAILY  clonazePAM (KlonoPIN) tablet 0.5 mg  0.5 mg Oral QHS PRN  
 traZODone (DESYREL) tablet 100 mg  100 mg Oral QHS  midodrine (PROAMITINE) tablet 10 mg  10 mg Oral TID WITH MEALS  tamsulosin (FLOMAX) capsule 0.4 mg  0.4 mg Oral QHS  busPIRone (BUSPAR) tablet 10 mg  10 mg Oral TID  ALPRAZolam (XANAX) tablet 0.5 mg  0.5 mg Oral QID PRN  
 sodium chloride (NS) flush 5-40 mL  5-40 mL IntraVENous Q8H  
 loperamide (IMODIUM) capsule 4 mg  4 mg Oral QID PRN  
  sodium chloride 3% hypertonic nebulizer soln  4 mL Nebulization BID RT  
 pantothenic ac-min oil-pet,hyd (AQUAPHOR) 41 % ointment   Topical DAILY  traMADol (ULTRAM) tablet 50 mg  50 mg Oral Q6H PRN  
 sodium chloride (NS) flush 5-40 mL  5-40 mL IntraVENous Q8H  
 sodium chloride (NS) flush 5-40 mL  5-40 mL IntraVENous PRN  
 acetaminophen (TYLENOL) tablet 650 mg  650 mg Oral Q4H PRN  
 naloxone (NARCAN) injection 0.4 mg  0.4 mg IntraVENous PRN  
 ondansetron (ZOFRAN) injection 4 mg  4 mg IntraVENous Q4H PRN  
 bisacodyL (DULCOLAX) tablet 5 mg  5 mg Oral DAILY PRN Objective:  
 
Vitals:  
 02/28/20 2344 02/29/20 3789 02/29/20 3179 02/29/20 4708 BP: 92/50 100/57  116/54 Pulse: 85 83  84 Resp: 22 22  20 Temp: 98.2 °F (36.8 °C) 99.2 °F (37.3 °C)  97.5 °F (36.4 °C) SpO2: 95% 94%  94% Weight:   115.2 kg (253 lb 15.5 oz) Height:      
  
  
Intake and Output: 
Current Shift: 02/29 0701 - 02/29 1900 In: 480 [P.O.:480] Out: 100 [Urine:100] Last three shifts: 02/27 1901 - 02/29 0700 In: 240 [P.O.:240] Out: 500 [Urine:500] Physical Exam:  
General:  Alert, cooperative, no distress, appears stated age. Eyes:  Conjunctivae/corneas clear. Ears:  Normal TMs and external ear canals both ears. Nose: Nares normal. Septum midline. Mouth/Throat: Lips, mucosa, and tongue normal.   
Neck:  no JVD. Back:   deferred Lungs:   Clear to auscultation bilaterally. Heart:  irregularly irregular Abdomen:   Soft, non-tender. Bowel sounds normal.  
Extremities: Necrotic toes at tips Good PT/DP bilaterally Pulses: 2+ and symmetric all extremities. Skin: As above Lymph nodes: Cervical, supraclavicular, and axillary nodes normal.  
Neurologic: CNII-XII intact. Lab/Data Review: 
 
Recent Results (from the past 24 hour(s)) GLUCOSE, POC Collection Time: 02/28/20 10:53 AM  
Result Value Ref Range Glucose (POC) 171 (H) 65 - 100 mg/dL PROTHROMBIN TIME + INR  
 Collection Time: 02/28/20 12:28 PM  
Result Value Ref Range Prothrombin time 20.7 (H) 12.0 - 14.7 sec INR 1.7 GLUCOSE, POC Collection Time: 02/28/20  4:21 PM  
Result Value Ref Range Glucose (POC) 191 (H) 65 - 100 mg/dL GLUCOSE, POC Collection Time: 02/28/20  8:41 PM  
Result Value Ref Range Glucose (POC) 204 (H) 65 - 100 mg/dL GLUCOSE, POC Collection Time: 02/29/20  5:29 AM  
Result Value Ref Range Glucose (POC) 176 (H) 65 - 100 mg/dL PROTHROMBIN TIME + INR Collection Time: 02/29/20  7:01 AM  
Result Value Ref Range Prothrombin time 23.8 (H) 12.0 - 14.7 sec INR 2.1    
CBC WITH AUTOMATED DIFF Collection Time: 02/29/20  7:01 AM  
Result Value Ref Range WBC 9.0 4.3 - 11.1 K/uL  
 RBC 2.73 (L) 4.23 - 5.6 M/uL HGB 7.7 (L) 13.6 - 17.2 g/dL HCT 25.4 (L) 41.1 - 50.3 % MCV 93.0 79.6 - 97.8 FL  
 MCH 28.2 26.1 - 32.9 PG  
 MCHC 30.3 (L) 31.4 - 35.0 g/dL  
 RDW 14.1 11.9 - 14.6 % PLATELET 396 141 - 193 K/uL MPV 10.0 9.4 - 12.3 FL ABSOLUTE NRBC 0.00 0.0 - 0.2 K/uL  
 DF AUTOMATED NEUTROPHILS 62 43 - 78 % LYMPHOCYTES 28 13 - 44 % MONOCYTES 6 4.0 - 12.0 % EOSINOPHILS 3 0.5 - 7.8 % BASOPHILS 1 0.0 - 2.0 % IMMATURE GRANULOCYTES 0 0.0 - 5.0 %  
 ABS. NEUTROPHILS 5.6 1.7 - 8.2 K/UL  
 ABS. LYMPHOCYTES 2.5 0.5 - 4.6 K/UL  
 ABS. MONOCYTES 0.6 0.1 - 1.3 K/UL  
 ABS. EOSINOPHILS 0.2 0.0 - 0.8 K/UL  
 ABS. BASOPHILS 0.1 0.0 - 0.2 K/UL  
 ABS. IMM. GRANS. 0.0 0.0 - 0.5 K/UL METABOLIC PANEL, BASIC Collection Time: 02/29/20  7:01 AM  
Result Value Ref Range Sodium 137 136 - 145 mmol/L Potassium 4.6 3.5 - 5.1 mmol/L Chloride 103 98 - 107 mmol/L  
 CO2 27 21 - 32 mmol/L Anion gap 7 7 - 16 mmol/L Glucose 129 (H) 65 - 100 mg/dL BUN 46 (H) 8 - 23 MG/DL Creatinine 3.00 (H) 0.8 - 1.5 MG/DL  
 GFR est AA 27 (L) >60 ml/min/1.73m2 GFR est non-AA 22 (L) >60 ml/min/1.73m2 Calcium 7.8 (L) 8.3 - 10.4 MG/DL Assessment/ Plan: Active Problems: 
  DM type 2 (diabetes mellitus, type 2) ([facility] [address] (1/14/2013) HLD (hyperlipidemia) (1/14/2013) Obesity, morbid ([address] (10/19/2018) Type 2 diabetes with nephropathy ([facility] [address] (8/26/2019) CAD (coronary artery disease) (1/10/2020) S/P CABG x 3 (1/10/2020) S/P AVR (1/10/2020) Atrial fibrillation ([address] (1/13/2020) HIT (heparin-induced thrombocytopenia) ([facility] [address] (1/23/2020) Hypotension (2/18/2020) Acute renal failure on dialysis ([facility] [address] (2/25/2020) DNR (do not resuscitate) (2/26/2020) Acute on chronic respiratory failure - albuterol q 6hr 
 
DEJUAN now requiring dialysis - Dialysis on TTS Orthostatic hypotension - midodrine DM type II - Lantus 45 units. SS. 15 units humalog before meals. DVT - warfarin CAD s/p CABG and AVR 1/10/20, a fib- Coumadin. Daily INR Depression - no longer suicidal or homicidal risk. Buspar 10mg TID INR 2.1. DVT prophylaxis - Warfarin Look to dc once has placement Signed By: Dakota Pratt DO February 29, 2020

## 2020-03-01 NOTE — PROGRESS NOTES
Shift assessment completed via doc flow sheet. Pt A & O x 4. Lungs CTA, but diminished. HR WNL, NSR. Abdomen soft and non tender. No c/o pain at this time. IVS c/d/i, no s/sx of infection/infiltration noted. Pt able to make needs known. No concerns at this time. Bed low and locked. Call light within reach. Will continue to monitor.

## 2020-03-01 NOTE — PROGRESS NOTES
Warfarin dosing per pharmacist 
 
Louisa Fernandez is a 69 y.o. male. Height: 5' 10\" (177.8 cm)   Weight 103.5 kg (228 lbs) Indication:  AVR and afib Goal INR:  2.5 - 3.5 Home dose:  2.5 mg daily Risk factors or significant drug interactions: amiodarone (dc'd 2/5), Levofloxacin (2/13- 2/20 ), mirtazapine (2/13-2/24), escitalopram (2/19-2/20), trazodone (started 2/24) Other anticoagulants:  N/A Daily Monitoring Date  INR     Warfarin dose HGB              Notes 2/14  2.4  3 mg  8.7 2/15  2.2  4 mg  9.6 2/16  2.1  5 mg  9.8 2/17  2.1  5 mg  9.5 2/18  2.8  2 mg  9.3 2/19  2.5  2 mg  8.7 
2/20  1.9  3 mg   8.8 
2/21  1.9  4 mg  8.6 
2/22  2.1  3 mg  8.5 
2/23  1.9  4 mg  --- 
2/24  1.7  3 mg + 2 mg --- 
2/25  1.7  5 mg  --- 
2/26  1.6  6 mg  --- 
2/27  1.7  6 mg   --- 
2/28  1.7  7.5 mg  --- 
2/29  2.1 ` 6 mg  7.7 
3/1  2.0  7.5 mg  --- 
 
INR still subtherapeutic. Will increase dose to 7.5 mg this evening based on current trend of INR. Pharmacy will continue to follow patient and monitor INR daily. Thank you, 
Alphia Boxer, PharmD, [facility] Clinical Pharmacist 
[phone]

## 2020-03-01 NOTE — PROGRESS NOTES
Renal Progress Note Admission Date: 2/13/2020 Subjective:  
  
Patient seen and examined on rounds ROS:  
Complete 12-pint ROS reviewed and negative except pertinent positives above in HPI. Objective:  
 
Physical Exam:   
Patient Vitals for the past 8 hrs: 
 BP Temp Pulse Resp SpO2 Weight 03/01/20 0737 100/47 97.7 °F (36.5 °C) 83 18 94 %   
03/01/20 0633      115.2 kg (253 lb 15.5 oz) Gen: comfortable , NAD HEENT: moist membranes CV: S1, S2, regular rate Lungs: Clear bilaterally, no audible wheezing Extem: mild edema, no cyanosis Current Facility-Administered Medications Medication Dose Route Frequency  warfarin (COUMADIN) tablet 6 mg  6 mg Oral QPM  
 epoetin maritza-epbx (RETACRIT) 14,000 Units combo injection  14,000 Units SubCUTAneous Q7D  
 insulin glargine (LANTUS) injection 45 Units  45 Units SubCUTAneous QHS  insulin lispro (HUMALOG) injection 15 Units  15 Units SubCUTAneous TIDAC  insulin lispro (HUMALOG) injection   SubCUTAneous AC&HS  
 albuterol (PROVENTIL VENTOLIN) nebulizer solution 2.5 mg  2.5 mg Nebulization Q6HWA RT  
 polyethylene glycol (MIRALAX) packet 17 g  17 g Oral DAILY  clonazePAM (KlonoPIN) tablet 0.5 mg  0.5 mg Oral QHS PRN  
 traZODone (DESYREL) tablet 100 mg  100 mg Oral QHS  midodrine (PROAMITINE) tablet 10 mg  10 mg Oral TID WITH MEALS  tamsulosin (FLOMAX) capsule 0.4 mg  0.4 mg Oral QHS  busPIRone (BUSPAR) tablet 10 mg  10 mg Oral TID  ALPRAZolam (XANAX) tablet 0.5 mg  0.5 mg Oral QID PRN  
 sodium chloride (NS) flush 5-40 mL  5-40 mL IntraVENous Q8H  
 loperamide (IMODIUM) capsule 4 mg  4 mg Oral QID PRN  
 sodium chloride 3% hypertonic nebulizer soln  4 mL Nebulization BID RT  
 pantothenic ac-min oil-pet,hyd (AQUAPHOR) 41 % ointment   Topical DAILY  traMADol (ULTRAM) tablet 50 mg  50 mg Oral Q6H PRN  
 sodium chloride (NS) flush 5-40 mL  5-40 mL IntraVENous Q8H  
  sodium chloride (NS) flush 5-40 mL  5-40 mL IntraVENous PRN  
 acetaminophen (TYLENOL) tablet 650 mg  650 mg Oral Q4H PRN  
 naloxone (NARCAN) injection 0.4 mg  0.4 mg IntraVENous PRN  
 ondansetron (ZOFRAN) injection 4 mg  4 mg IntraVENous Q4H PRN  
 bisacodyL (DULCOLAX) tablet 5 mg  5 mg Oral DAILY PRN Data Review:  
 
LABS:  
Recent Results (from the past 12 hour(s)) GLUCOSE, POC Collection Time: 03/01/20  5:44 AM  
Result Value Ref Range Glucose (POC) 146 (H) 65 - 100 mg/dL PROTHROMBIN TIME + INR Collection Time: 03/01/20  7:28 AM  
Result Value Ref Range Prothrombin time 23.3 (H) 12.0 - 14.7 sec INR 2.0 METABOLIC PANEL, BASIC Collection Time: 03/01/20  7:28 AM  
Result Value Ref Range Sodium 136 136 - 145 mmol/L Potassium 4.5 3.5 - 5.1 mmol/L Chloride 103 98 - 107 mmol/L  
 CO2 28 21 - 32 mmol/L Anion gap 5 (L) 7 - 16 mmol/L Glucose 123 (H) 65 - 100 mg/dL BUN 29 (H) 8 - 23 MG/DL Creatinine 2.23 (H) 0.8 - 1.5 MG/DL  
 GFR est AA 37 (L) >60 ml/min/1.73m2 GFR est non-AA 31 (L) >60 ml/min/1.73m2 Calcium 7.6 (L) 8.3 - 10.4 MG/DL Plan: Active Problems: 
  DM type 2 (diabetes mellitus, type 2) ([facility] (1/14/2013) HLD (hyperlipidemia) (1/14/2013) [facility], [facility] (10/19/2018) Type 2 diabetes with nephropathy ([facility] (8/26/2019) CAD (coronary artery disease) (1/10/2020) S/P CABG x 3 (1/10/2020) S/P AVR (1/10/2020) Atrial fibrillation ([facility] (1/13/2020) HIT (heparin-induced thrombocytopenia) ([facility] (1/23/2020) Hypotension (2/18/2020) Acute renal failure on dialysis ([facility] [address] (2/25/2020) DNR (do not resuscitate) (2/26/2020) Probably ESRD - no evidence of renal recovery - ([place] TTS schedule) 
  
HIT/SHARON + 
  
ASHD s/p CABG 
  
Orthostatic Hypotension - on midodrine Anemia - start IV iron replacement

## 2020-03-01 NOTE — PROGRESS NOTES
Patient resting in bed with no complaints at this time. Patient is alert and orientated with no distress noted. IV intact and patent with no s/s of infection noted. Respirations even and unlabored with heart rate regular. Patient unable to ambulate independently without assistance, needs x1 r/t weakness. Bed in low locked position with call light within reach. Patient instructed to call if assistance is needed. Will continue to monitor.

## 2020-03-01 NOTE — PROGRESS NOTES
Problem: Pressure Injury - Risk of 
Goal: *Prevention of pressure injury Description Document Alfredo Scale and appropriate interventions in the flowsheet. Outcome: Progressing Towards Goal 
Note: Pressure Injury Interventions: 
Sensory Interventions: Assess changes in LOC Moisture Interventions: Absorbent underpads Activity Interventions: Assess need for specialty bed Mobility Interventions: Assess need for specialty bed Nutrition Interventions: Document food/fluid/supplement intake Friction and Shear Interventions: Apply protective barrier, creams and emollients Problem: Falls - Risk of 
Goal: *Absence of Falls Description Document Hillary Wilver Fall Risk and appropriate interventions in the flowsheet. Outcome: Progressing Towards Goal 
Note: Fall Risk Interventions: 
Mobility Interventions: Bed/chair exit alarm Mentation Interventions: Adequate sleep, hydration, pain control Medication Interventions: Bed/chair exit alarm Elimination Interventions: Bed/chair exit alarm History of Falls Interventions: Bed/chair exit alarm Problem: Breathing Pattern - Ineffective Goal: *Absence of hypoxia Outcome: Progressing Towards Goal 
  
Problem: Gas Exchange - Impaired Goal: *Absence of hypoxia Outcome: Progressing Towards Goal

## 2020-03-01 NOTE — PROGRESS NOTES
Progress Note Patient: Cally Gandhi MRN: [id number]  SSN: [id number] YOB: 1945  Age: 69 y.o. Sex: male Admit Date: 2/13/2020 LOS: 15 days Subjective:  
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 CXR showed interstitial edema/ left pleural effusion. CTA chest no PE. He completed a course of levaquin 2-20-20. He was seen by pulmonary s/p bronch 2-17-20. He had some hypotension started on midodrine. He had worsening depression, seen by tele psyche for suicidal ideation with sitter that resolved. Discharge plans pending due to recurrent insurance denials. \" Today, no chest pain or SOB. Continue to want to talk about insurance approval.  
  
Current Facility-Administered Medications Medication Dose Route Frequency  ferric gluconate (FERRLECIT) 125 mg in 0.9% sodium chloride 100 mL IVPB  125 mg IntraVENous DAILY  warfarin (COUMADIN) tablet 6 mg  6 mg Oral QPM  
 epoetin maritza-epbx (RETACRIT) 14,000 Units combo injection  14,000 Units SubCUTAneous Q7D  
 insulin glargine (LANTUS) injection 45 Units  45 Units SubCUTAneous QHS  insulin lispro (HUMALOG) injection 15 Units  15 Units SubCUTAneous TIDAC  insulin lispro (HUMALOG) injection   SubCUTAneous AC&HS  
 albuterol (PROVENTIL VENTOLIN) nebulizer solution 2.5 mg  2.5 mg Nebulization Q6HWA RT  
 polyethylene glycol (MIRALAX) packet 17 g  17 g Oral DAILY  clonazePAM (KlonoPIN) tablet 0.5 mg  0.5 mg Oral QHS PRN  
 traZODone (DESYREL) tablet 100 mg  100 mg Oral QHS  midodrine (PROAMITINE) tablet 10 mg  10 mg Oral TID WITH MEALS  tamsulosin (FLOMAX) capsule 0.4 mg  0.4 mg Oral QHS  busPIRone (BUSPAR) tablet 10 mg  10 mg Oral TID  ALPRAZolam (XANAX) tablet 0.5 mg  0.5 mg Oral QID PRN  
 sodium chloride (NS) flush 5-40 mL  5-40 mL IntraVENous Q8H  
 loperamide (IMODIUM) capsule 4 mg  4 mg Oral QID PRN  
 sodium chloride 3% hypertonic nebulizer soln  4 mL Nebulization BID RT  
 pantothenic ac-min oil-pet,hyd (AQUAPHOR) 41 % ointment   Topical DAILY  traMADol (ULTRAM) tablet 50 mg  50 mg Oral Q6H PRN  
 sodium chloride (NS) flush 5-40 mL  5-40 mL IntraVENous Q8H  
 sodium chloride (NS) flush 5-40 mL  5-40 mL IntraVENous PRN  
 acetaminophen (TYLENOL) tablet 650 mg  650 mg Oral Q4H PRN  
 naloxone (NARCAN) injection 0.4 mg  0.4 mg IntraVENous PRN  
 ondansetron (ZOFRAN) injection 4 mg  4 mg IntraVENous Q4H PRN  
 bisacodyL (DULCOLAX) tablet 5 mg  5 mg Oral DAILY PRN Objective:  
 
Vitals:  
 03/01/20 0140 03/01/20 5290 03/01/20 0737 03/01/20 1133 BP: 107/62  100/47 109/60 Pulse: 86  83 84 Resp: 18  18 20 Temp: 98.4 °F (36.9 °C)  97.7 °F (36.5 °C) 98.2 °F (36.8 °C) SpO2: 94%  94% 91% Weight:  115.2 kg (253 lb 15.5 oz) Height:      
  
  
Intake and Output: 
Current Shift: No intake/output data recorded. Last three shifts: 02/28 1901 - 03/01 0700 In: 480 [P.O.:480] Out: 2200 [Urine:700] Physical Exam:  
General:  Alert, cooperative, no distress, appears stated age. Eyes continue to blink Eyes:  Conjunctivae/corneas clear. Ears:  Normal TMs and external ear canals both ears. Nose: Nares normal. Septum midline. Mouth/Throat: Lips, mucosa, and tongue normal.   
Neck:  no JVD. Back:   deferred Lungs:   Clear to auscultation bilaterally. Heart:  RRR Abdomen:   Soft, non-tender. Bowel sounds normal.  
Extremities: Did not examine feet today Pulses: 2+ and symmetric all extremities. Skin: As above Lymph nodes: Cervical, supraclavicular, and axillary nodes normal.  
Neurologic: CNII-XII intact. Lab/Data Review: 
 
Recent Results (from the past 24 hour(s)) GLUCOSE, POC  
 Collection Time: 02/29/20  4:15 PM  
Result Value Ref Range Glucose (POC) 83 65 - 100 mg/dL GLUCOSE, POC Collection Time: 02/29/20  8:38 PM  
Result Value Ref Range Glucose (POC) 144 (H) 65 - 100 mg/dL GLUCOSE, POC Collection Time: 03/01/20  5:44 AM  
Result Value Ref Range Glucose (POC) 146 (H) 65 - 100 mg/dL PROTHROMBIN TIME + INR Collection Time: 03/01/20  7:28 AM  
Result Value Ref Range Prothrombin time 23.3 (H) 12.0 - 14.7 sec INR 2.0 METABOLIC PANEL, BASIC Collection Time: 03/01/20  7:28 AM  
Result Value Ref Range Sodium 136 136 - 145 mmol/L Potassium 4.5 3.5 - 5.1 mmol/L Chloride 103 98 - 107 mmol/L  
 CO2 28 21 - 32 mmol/L Anion gap 5 (L) 7 - 16 mmol/L Glucose 123 (H) 65 - 100 mg/dL BUN 29 (H) 8 - 23 MG/DL Creatinine 2.23 (H) 0.8 - 1.5 MG/DL  
 GFR est AA 37 (L) >60 ml/min/1.73m2 GFR est non-AA 31 (L) >60 ml/min/1.73m2 Calcium 7.6 (L) 8.3 - 10.4 MG/DL  
GLUCOSE, POC Collection Time: 03/01/20 11:36 AM  
Result Value Ref Range Glucose (POC) 215 (H) 65 - 100 mg/dL Assessment/ Plan: Active Problems: 
  DM type 2 (diabetes mellitus, type 2) ([facility] (1/14/2013) HLD (hyperlipidemia) (1/14/2013) Obesity, morbid ([facility] (10/19/2018) Type 2 diabetes with nephropathy ([facility] (8/26/2019) CAD (coronary artery disease) (1/10/2020) S/P CABG x 3 (1/10/2020) S/P AVR (1/10/2020) Atrial fibrillation ([facility] (1/13/2020) HIT (heparin-induced thrombocytopenia) ([facility] (1/23/2020) Hypotension (2/18/2020) Acute renal failure on dialysis ([address] (2/25/2020) DNR (do not resuscitate) (2/26/2020) Acute on chronic respiratory failure - albuterol q 6hr 
 
DEJUAN now requiring dialysis - Dialysis on TTS Orthostatic hypotension - midodrine DM type II - Lantus 45 units. SS. 15 units humalog before meals. DVT - warfarin CAD s/p CABG and AVR 1/10/20, a fib- Coumadin.  Daily INR 
 
 Depression - no longer suicidal or homicidal risk. Buspar 10mg TID INR 2.1. DVT prophylaxis - Warfarin Look to dc once has placement Signed By: Jarek Donovan DO March 1, 2020

## 2020-03-01 NOTE — PROGRESS NOTES
Patient got up in recliner to eat dinner and patient stated everything went white. Patient stated when this happened before my blood sugar was low. FSBS checked with a result of 50. Patient alert and talking at this time. Patient was instructed to eat dinner which was pasta, mixed vegs, bread, milk, and a cookie. Patient was also given an orange juice. Patient stable at this time eating dinner. Call light within reach and patient instructed to call if assistance is needed. Report to be given to oncoming RN 7p-7a

## 2020-03-02 NOTE — INTERDISCIPLINARY ROUNDS
Interdisciplinary team rounds were held 3/2/2020 with the following team members:Care Management, Physical Therapy, Physician and Clinical Coordinator and the patient. Plan of care discussed. See clinical pathway and/or care plan for interventions and desired outcomes.

## 2020-03-02 NOTE — PROGRESS NOTES
Received bedside shift report from [facility], Wilfrido Lyons. Patient sitting up in recliner at this time, awake, respirations even and unlabored. Denies needs at this time. Bed low and locked. Bedside table, personal belongings and call light within reach.

## 2020-03-02 NOTE — PROGRESS NOTES
DONNAAR received from [facility]. Patient remains in stable condition with respirations even/unlabored. No acute distress noted at this time. Oxygen to nasal cannula. Call light remains within reach, patient encouraged to call nurse prn assist. Will continue to monitor per policy.

## 2020-03-02 NOTE — PROGRESS NOTES
Problem: Mobility Impaired (Adult and Pediatric) Goal: *Acute Goals and Plan of Care (Insert Text) Description LTG: (Reviewed and updated 3/2/20) (1.)Mr. Lani Hicks will move from supine to sit and sit to supine , scoot up and down and roll side to side INDEPENDENTLY within 7 treatment day(s) from flat surface. (2.)Mr. Lani Hicks will transfer from bed to chair and chair to bed with MODIFIED INDEPENDENCE using the least restrictive device within 7 treatment day(s). (3.)Mr. Lani Hicks will ambulate with STAND BY ASSIST for 150+ feet with the least restrictive device within 7 treatment day(s) while maintaining normal vital signs. (4.)Mr. Lani Hicks will perform exercises per HEP for 10+ minutes to improve strength and mobility within 7 days. ____________________________________________________________________________________________ Outcome: Progressing Towards Goal 
  
PHYSICAL THERAPY: Re-evaluation and AM 3/2/2020 INPATIENT: PT Visit Days : (REEVAL) Payor: Ramiro Macedo / Plan: Chuck Law / Product Type: [employer] Medicare /   
  
NAME/AGE/GENDER: Carlos Chilel is a 69 y.o. male PRIMARY DIAGNOSIS: Acute respiratory failure ([facility] [J96.00] Fluid overload [E87.70] Acute hypoxemic respiratory failure (HCC) Acute hypoxemic respiratory failure (HCC) Procedure(s) (LRB): BRONCHOSCOPY (N/A) BRONCHIAL ALVEOLAR LAVAGE (N/A) 14 Days Post-Op ICD-10: Treatment Diagnosis:  
 · Generalized Muscle Weakness (M62.81) · Difficulty in walking, Not elsewhere classified (R26.2) Precaution/Allergies: 
Heparin; Amoxicillin; Keflex [cephalexin]; and Pcn [penicillins] ASSESSMENT:  
 
Mr. Lani Hicks presents sitting up at edge of bed, recently finished OT session and is agreeable to mobility, attempts at ambulation. Seated /62 in chair. Upon standing BP noted to be 76/39 with pt symptomatic and stating \"everything is going white\".  Returned to sitting and slowly feeling better. This happens again on second standing attempt. Unsafe to ambulate today due to symptomatic hypotension. Pt took a few steps back to bed with CGA/RW and returned to supine with supervision. Performs below supine exercises per review with good participation. Pt has made slow progress overall towards therapy goals- he was willing to try today however activity limited by hypotension. Goals updated to show slow progress. Will continue with therapy efforts during hospital stay. Likely needs rehab at [place]. This section established at most recent assessment PROBLEM LIST (Impairments causing functional limitations): 1. Decreased Strength 2. Decreased ADL/Functional Activities 3. Decreased Transfer Abilities 4. Decreased Ambulation Ability/Technique 5. Decreased Balance 6. Decreased Activity Tolerance 7. Increased Fatigue 8. Increased Shortness of Breath 9. Edema/Girth INTERVENTIONS PLANNED: (Benefits and precautions of physical therapy have been discussed with the patient.) 1. Balance Exercise 2. Bed Mobility 3. Family Education 4. Gait Training 5. Home Exercise Program (HEP) 6. Neuromuscular Re-education/Strengthening 7. Therapeutic Activites 8. Therapeutic Exercise/Strengthening 9. Transfer Training TREATMENT PLAN: Frequency/Duration: 3 times a week for duration of hospital stay Rehabilitation Potential For Stated Goals: Good REHAB RECOMMENDATIONS (at time of discharge pending progress):   
Placement: It is my opinion, based on this patient's performance to date, that Mr. Merlinda Och may benefit from intensive therapy at a [address] [place] [address] after discharge due to the functional deficits listed above that are likely to improve with skilled rehabilitation and concerns that he/she may be unsafe to be unsupervised at home due to a fall risk, safety concerns for transfers and ambulation at home. Equipment: ? To be determined HISTORY:  
 History of Present Injury/Illness (Reason for Referral): Mr. Roxanne Ridley is a 70 yo male with PMH of DM II, HTN, CAD s/p CABG, s/p aortic valve repair, JOAQUIM on bipap, multiple DVTs on coumadin, new HD pt, HIT +, necrosis of toes/fingers from levophed who presented from HD with c/o acute sudden onset of SOB. Was DC 2/12/20 from CABG and aortic valve replacement complicated by CKD requiring HD, HIT +, DVTs, wound left thigh on wound vac, pneumonia DC on levaquin Q 48 hours last dose 2/11/20. He was DC on 2 L O2 however has been increased to 4L O2 via NC at STR yesterday when he arrived. Today he was at HD and reports dizziness with sitting up and acute onset of SOB. Son at bedside and states pt was doing ok since DC yesterday until today at HD. INR 2.4. CXR showed improving interstitial edema, unchanged left basilar opacity and left pleural effusion. Pt given lasix in ED. Pt is SOB when talking in short sentences. Denies CP, n/v.  Has had diarrhea however is not new since hospitalization. Past Medical History/Comorbidities:  
Mr. Roxanne Ridley  has a past medical history of Arthritis, BPH (benign prostatic hyperplasia) (1/14/2013), CAD (coronary artery disease), DM type 2 (diabetes mellitus, type 2) ([facility] (dx 2004), Dyspnea, Gout (1/14/2013), HLD (hyperlipidemia) (1/14/2013), HTN (hypertension), Morbid obesity ([facility] (9/3/14), Psychiatric disorder, Rheumatic fever, Seasonal allergic rhinitis, Severe aortic valve stenosis, Thyroid disease, and Unspecified sleep apnea (2016). Mr. Roxanne Ridley  has a past surgical history that includes hx tonsil and adenoidectomy; hx heart catheterization (12/23/2019); hx orthopaedic (Left); hx cataract removal (Bilateral, 2012); and ir insert tunl cvc w port over 5 years (2/4/2020). Social History/Living Environment:  
Home Environment: Private residence # Steps to Enter: 2 Rails to Enter: No 
One/Two Story Residence: One story Living Alone: No 
 Support Systems: Spouse/Significant Other/Partner Patient Expects to be Discharged to[de-identified] Rehabilitation facility Current DME Used/Available at Home: Cane, straight, Walker, rolling Tub or Shower Type: Shower Prior Level of Function/Work/Activity: 
Lives with spouse, admit from rehab; has been using RW short distance ambulation, assist ADLs Personal Factors:   
      Sex:  male Age:  69 y.o. Overall Behavior:  agreeable Number of Personal Factors/Comorbidities that affect the Plan of Care: 
CAD, DM, DVTs, age 3+: HIGH COMPLEXITY EXAMINATION:  
Most Recent Physical Functioning:  
Gross Assessment: 
AROM: Within functional limits Strength: Generally decreased, functional 
         
  
Posture: 
Posture (WDL): Exceptions to [facility] Posture Assessment: Forward head, Rounded shoulders Balance: 
Sitting: Intact Standing: Impaired Standing - Static: Fair Standing - Dynamic : Fair Bed Mobility: 
Sit to Supine: Supervision Scooting: Supervision Wheelchair Mobility: 
  
Transfers: 
Sit to Stand: Contact guard assistance;Stand-by assistance Stand to Sit: Stand-by assistance Bed to Chair: Contact guard assistance Interventions: Verbal cues; Safety awareness training Duration: 13 Minutes Gait: 
  
Base of Support: Widened;Center of gravity altered Speed/Federica: Pace decreased (<100 feet/min); Slow Step Length: Left shortened;Right shortened Gait Abnormalities: Shuffling gait;Trunk sway increased Distance (ft): 3 Feet (ft) Assistive Device: Walker, rolling Ambulation - Level of Assistance: Contact guard assistance Interventions: Verbal cues; Safety awareness training; Tactile cues Body Structures Involved: 1. Heart 2. Lungs 3. Muscles Body Functions Affected: 1. Cardio 2. Respiratory 3. Movement Related Activities and Participation Affected: 1. General Tasks and Demands 2. Mobility 3. Self Care Number of elements that affect the Plan of Care: 4+: HIGH COMPLEXITY CLINICAL PRESENTATION:  
Presentation: Evolving clinical presentation with changing clinical characteristics: MODERATE COMPLEXITY CLINICAL DECISION MAKIN[address] [place] [id number] AM-PAC 6 Clicks Basic Mobility Inpatient Short Form How much difficulty does the patient currently have. .. Unable A Lot A Little None 1. Turning over in bed (including adjusting bedclothes, sheets and blankets)? [] 1   [] 2   [x] 3   [] 4  
2. Sitting down on and standing up from a chair with arms ( e.g., wheelchair, bedside commode, etc.)   [] 1   [] 2   [x] 3   [] 4  
3. Moving from lying on back to sitting on the side of the bed? [] 1   [] 2   [x] 3   [] 4 How much help from another person does the patient currently need. .. Total A Lot A Little None 4. Moving to and from a bed to a chair (including a wheelchair)? [] 1   [] 2   [x] 3   [] 4  
5. Need to walk in hospital room? [] 1   [] 2   [x] 3   [] 4  
6. Climbing 3-5 steps with a railing? [] 1   [x] 2   [] 3   [] 4  
© , Trustees of [address] [place] [id number], under license to [email or web address]. All rights reserved Score:  Initial: 17 Most Recent: X (Date: -- ) Interpretation of Tool:  Represents activities that are increasingly more difficult (i.e. Bed mobility, Transfers, Gait). Medical Necessity:    
· Patient is expected to demonstrate progress in  
· strength, range of motion, balance, and coordination ·  to  
· decrease assistance required with overall functional mobility, transfers, ambulation · . · Patient demonstrates · good ·  rehab potential due to higher previous functional level. Reason for Services/Other Comments: 
· Patient · continues to require present interventions due to patient's inability to perform bed mobility, transfers, ambulation safely and effectively · . Use of outcome tool(s) and clinical judgement create a POC that gives a: Clear prediction of patient's progress: LOW COMPLEXITY  
  
 
 
 
TREATMENT:  
 (In addition to Assessment/Re-Assessment sessions the following treatments were rendered) Pre-treatment Symptoms/Complaints:  \"I need to get back in bed, it's easier for me to breathe that way\" Pain: Initial:  
Pain Intensity 1: 0  Post Session: 0 PT Reassessment Table:  
Initial Physical Functioning: Most Recent Physical Functioning:  
Gross Assessment: 
AROM: Generally decreased, functional(B LE) Strength: Generally decreased, functional(B LE grossly 4/5) Coordination: Generally decreased, functional 
Sensation: Intact(B LE) Gross Assessment:  
AROM: Within functional limits Strength: Generally decreased, functional  
Posture:  
Posture (WDL): Exceptions to [facility] Posture Assessment: Forward head, Rounded shoulders Posture:  
Posture (WDL): Exceptions to [facility] Posture Assessment: Forward head;Rounded shoulders Balance:  
Sitting: Intact Standing: Impaired Standing - Static: Good, Fair Standing - Dynamic : Fair Balance:  
Sitting: Intact Standing: Impaired Standing - Static: Fair Standing - Dynamic : Fair Bed Mobility:  
Rolling: Stand-by assistance Supine to Sit: Minimum assistance Sit to Supine: Minimum assistance Scooting: Minimum assistance Interventions: Manual cues, Verbal cues Bed Mobility:  
Sit to Supine: Supervision Scooting: Supervision Transfers:  
Sit to Stand: Minimum assistance Stand to Sit: Minimum assistance Bed to Chair: Minimum assistance Interventions: Safety awareness training, Verbal cues, Visual cues Duration: 13 Minutes Transfers:  
Sit to Stand: Contact guard assistance;Stand-by assistance Stand to Sit: Stand-by assistance Bed to Chair: Contact guard assistance Interventions: Verbal cues; Safety awareness training Duration: 13 Minutes Gait:  
Base of Support: Widened Speed/Federica: Slow Step Length: Left shortened, Right shortened Swing Pattern: Left symmetrical, Right symmetrical 
Gait Abnormalities: Decreased step clearance Ambulation - Level of Assistance: Contact guard assistance, Minimal assistance Distance (ft): 5 Feet (ft)(from bed to chair, chair to bed) Assistive Device: Walker, rolling Interventions: Safety awareness training, Verbal cues Gait:  
Base of Support: Widened;Center of gravity altered Speed/Federica: Pace decreased (<100 feet/min); Slow Step Length: Left shortened;Right shortened Gait Abnormalities: Shuffling gait;Trunk sway increased Ambulation - Level of Assistance: Contact guard assistance Distance (ft): 3 Feet (ft) Assistive Device: Walker, rolling Interventions: Verbal cues; Safety awareness training; Tactile cues Assessment/Reassessment only, no treatment provided today Date: 
2/26/20 Date: 
 Date: 
  
ACTIVITY/EXERCISE AM PM AM PM AM PM  
APs 1 x 20 B Heel slides 1 x 20 B Hip ABD/ADD 1 x 15 B SLR 1 x 10 B       
        
        
        
B = bilateral; AA = active assistive; A = active; P = passive Braces/Orthotics/Lines/Etc:  
· Nasal cannula Treatment/Session Assessment:   
· Response to Treatment:  Mobility limited by hypotension · Interdisciplinary Collaboration:  
o Physical Therapist 
o Registered Nurse · After treatment position/precautions:  
o Supine in bed 
o Bed/Chair-wheels locked 
o Bed in low position 
o Call light within reach 
o RN notified · Compliance with Program/Exercises: Will assess as treatment progresses · Recommendations/Intent for next treatment session: \"Next visit will focus on advancements to more challenging activities\". Total Treatment Duration: PT Patient Time In/Time Out Time In: 9457 Time Out: 1104 Khanh Blanchard DPT

## 2020-03-02 NOTE — PROGRESS NOTES
Progress Note Patient: Avani Trujillo MRN: [id number]  SSN: [id number] YOB: 1945  Age: 69 y.o. Sex: male Admit Date: 2/13/2020 LOS: 16 days Subjective:  
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 CXR showed interstitial edema/ left pleural effusion. CTA chest no PE. He completed a course of levaquin 2-20-20. He was seen by pulmonary s/p bronch 2-17-20. He had some hypotension started on midodrine. He had worsening depression, seen by tele psyche for suicidal ideation with sitter that resolved. Discharge plans pending due to recurrent insurance denials. \" Today, no chest pain or SOB. Continue to want to talk about insurance approval.  
Two episodes of hypotension over the past 24 hours. States has been eating and drinking well. Episode of hypoglycemia yesterday in afternoon. Current Facility-Administered Medications Medication Dose Route Frequency  insulin lispro (HUMALOG) injection 8 Units  8 Units SubCUTAneous TIDAC  warfarin (COUMADIN) tablet 6 mg  6 mg Oral QPM  
 ferric gluconate (FERRLECIT) 125 mg in 0.9% sodium chloride 100 mL IVPB  125 mg IntraVENous DAILY  epoetin maritza-epbx (RETACRIT) 14,000 Units combo injection  14,000 Units SubCUTAneous Q7D  
 insulin glargine (LANTUS) injection 45 Units  45 Units SubCUTAneous QHS  insulin lispro (HUMALOG) injection   SubCUTAneous AC&HS  
 albuterol (PROVENTIL VENTOLIN) nebulizer solution 2.5 mg  2.5 mg Nebulization Q6HWA RT  
 polyethylene glycol (MIRALAX) packet 17 g  17 g Oral DAILY  clonazePAM (KlonoPIN) tablet 0.5 mg  0.5 mg Oral QHS PRN  
 traZODone (DESYREL) tablet 100 mg  100 mg Oral QHS  midodrine (PROAMITINE) tablet 10 mg  10 mg Oral TID WITH MEALS  tamsulosin (FLOMAX) capsule 0.4 mg  0.4 mg Oral QHS  busPIRone (BUSPAR) tablet 10 mg  10 mg Oral TID  ALPRAZolam (XANAX) tablet 0.5 mg  0.5 mg Oral QID PRN  
 sodium chloride (NS) flush 5-40 mL  5-40 mL IntraVENous Q8H  
 loperamide (IMODIUM) capsule 4 mg  4 mg Oral QID PRN  
 sodium chloride 3% hypertonic nebulizer soln  4 mL Nebulization BID RT  
 pantothenic ac-min oil-pet,hyd (AQUAPHOR) 41 % ointment   Topical DAILY  traMADol (ULTRAM) tablet 50 mg  50 mg Oral Q6H PRN  
 sodium chloride (NS) flush 5-40 mL  5-40 mL IntraVENous Q8H  
 sodium chloride (NS) flush 5-40 mL  5-40 mL IntraVENous PRN  
 acetaminophen (TYLENOL) tablet 650 mg  650 mg Oral Q4H PRN  
 naloxone (NARCAN) injection 0.4 mg  0.4 mg IntraVENous PRN  
 ondansetron (ZOFRAN) injection 4 mg  4 mg IntraVENous Q4H PRN  
 bisacodyL (DULCOLAX) tablet 5 mg  5 mg Oral DAILY PRN Objective:  
 
Vitals:  
 03/01/20 2133 03/01/20 2351 03/02/20 2533 03/02/20 3407 BP:  97/40 131/67 (!) 82/51 Pulse:  77 86 88 Resp:  20 20 18 Temp:  98 °F (36.7 °C) 97.5 °F (36.4 °C) 97.9 °F (36.6 °C) SpO2: 97% 98% 93% 93% Weight:      
Height:      
  
  
Intake and Output: 
Current Shift: No intake/output data recorded. Last three shifts: 02/29 1901 - 03/02 0700 In: 1280 [P.O.:1280] Out: 800 [Urine:800] Physical Exam:  
General:  Alert, cooperative, no distress, appears stated age. Eyes continue to blink. Very talkative. Eyes:  Conjunctivae/corneas clear. Ears:  Normal TMs and external ear canals both ears. Nose: Nares normal. Septum midline. Mouth/Throat: Lips, mucosa, and tongue normal.   
Neck:  no JVD. Back:   deferred Lungs:   Clear to auscultation bilaterally. Heart:  RRR Abdomen:   Soft, non-tender. Bowel sounds normal.  
Extremities: Did not examine feet today Pulses: 2+ and symmetric all extremities. Skin: As above Lymph nodes: Cervical, supraclavicular, and axillary nodes normal.  
Neurologic: CNII-XII intact. Lab/Data Review: 
 
Recent Results (from the past 24 hour(s)) GLUCOSE, POC Collection Time: 03/01/20 11:36 AM  
Result Value Ref Range Glucose (POC) 215 (H) 65 - 100 mg/dL GLUCOSE, POC Collection Time: 03/01/20  3:56 PM  
Result Value Ref Range Glucose (POC) 127 (H) 65 - 100 mg/dL GLUCOSE, POC Collection Time: 03/01/20  5:51 PM  
Result Value Ref Range Glucose (POC) 50 (L) 65 - 100 mg/dL GLUCOSE, POC Collection Time: 03/01/20  6:08 PM  
Result Value Ref Range Glucose (POC) 52 (L) 65 - 100 mg/dL GLUCOSE, POC Collection Time: 03/01/20  6:25 PM  
Result Value Ref Range Glucose (POC) 68 65 - 100 mg/dL GLUCOSE, POC Collection Time: 03/01/20  8:58 PM  
Result Value Ref Range Glucose (POC) 132 (H) 65 - 100 mg/dL GLUCOSE, POC Collection Time: 03/02/20  5:23 AM  
Result Value Ref Range Glucose (POC) 112 (H) 65 - 100 mg/dL PROTHROMBIN TIME + INR Collection Time: 03/02/20  6:36 AM  
Result Value Ref Range Prothrombin time 31.0 (H) 12.0 - 14.7 sec INR 2.9 METABOLIC PANEL, BASIC Collection Time: 03/02/20  6:36 AM  
Result Value Ref Range Sodium 137 136 - 145 mmol/L Potassium 4.6 3.5 - 5.1 mmol/L Chloride 102 98 - 107 mmol/L  
 CO2 28 21 - 32 mmol/L Anion gap 7 7 - 16 mmol/L Glucose 111 (H) 65 - 100 mg/dL BUN 33 (H) 8 - 23 MG/DL Creatinine 2.40 (H) 0.8 - 1.5 MG/DL  
 GFR est AA 34 (L) >60 ml/min/1.73m2 GFR est non-AA 28 (L) >60 ml/min/1.73m2 Calcium 7.8 (L) 8.3 - 10.4 MG/DL Assessment/ Plan: Active Problems: 
  DM type 2 (diabetes mellitus, type 2) ([facility] (1/14/2013) HLD (hyperlipidemia) (1/14/2013) Obesity, morbid ([facility] (10/19/2018) Type 2 diabetes with nephropathy ([facility] (8/26/2019) CAD (coronary artery disease) (1/10/2020) S/P CABG x 3 (1/10/2020) S/P AVR (1/10/2020) Atrial fibrillation ([facility] (1/13/2020) HIT (heparin-induced thrombocytopenia) ([facility] (1/23/2020) Hypotension (2/18/2020) Acute renal failure on dialysis ([facility] (2/25/2020) DNR (do not resuscitate) (2/26/2020) Acute on chronic respiratory failure - albuterol q 6hr 
 
DEJUAN now requiring dialysis - Dialysis on TTS Orthostatic hypotension - midodrine 10mg TID 
 
DM type II - Lantus 45 units. SS. 15 units humalog before meals that I will decrease to 8 units. DVT - warfarin CAD s/p CABG and AVR 1/10/20, a fib- Coumadin. Daily INR Depression - no longer suicidal or homicidal risk. Buspar 10mg TID 
 
DVT prophylaxis - Warfarin Encourage good eating and drinking for BP and glucose stabilization. Look to dc once has placement Signed By: Reji Amaral DO March 2, 2020

## 2020-03-02 NOTE — PROGRESS NOTES
Patient remains in stable condition with respirations even/unlabored. No acute distress noted. No needs noted or voiced at this time. Safety measures in place. Patient on oxygen to nasal cannula. Call light remains within reach. Preparing to give report to oncoming shift.

## 2020-03-02 NOTE — PROGRESS NOTES
Renal Progress Note Admission Date: 2/13/2020 Subjective:  
  
Patient seen and examined on rounds, productive cough, SOB improving. Labs and chart reviewed ROS:  
General; no fever/ chills, appetite ok Lung: + exertional SOB- better CV: no CP 
GI: no N/V/D Ext: no edema Objective:  
 
Physical Exam:   
Patient Vitals for the past 8 hrs: 
 BP Temp Pulse Resp SpO2  
03/02/20 1113 100/66 98 °F (36.7 °C) 88 18 93 % 03/02/20 1104 (!) 76/39      
03/02/20 0729 (!) 82/51 97.9 °F (36.6 °C) 88 18 93 % Gen: comfortable , NAD HEENT: moist membranes CV: S1, S2, regular rate, no Rub Lungs: Clear bilaterally, no wheezing GI: soft, non-tender, + BS Extem: mild edema, no cyanosis Access: left TCC intact Current Facility-Administered Medications Medication Dose Route Frequency  insulin lispro (HUMALOG) injection 8 Units  8 Units SubCUTAneous TIDAC  warfarin (COUMADIN) tablet 6 mg  6 mg Oral QPM  
 ferric gluconate (FERRLECIT) 125 mg in 0.9% sodium chloride 100 mL IVPB  125 mg IntraVENous DAILY  epoetin maritza-epbx (RETACRIT) 14,000 Units combo injection  14,000 Units SubCUTAneous Q7D  
 insulin glargine (LANTUS) injection 45 Units  45 Units SubCUTAneous QHS  insulin lispro (HUMALOG) injection   SubCUTAneous AC&HS  
 albuterol (PROVENTIL VENTOLIN) nebulizer solution 2.5 mg  2.5 mg Nebulization Q6HWA RT  
 polyethylene glycol (MIRALAX) packet 17 g  17 g Oral DAILY  clonazePAM (KlonoPIN) tablet 0.5 mg  0.5 mg Oral QHS PRN  
 traZODone (DESYREL) tablet 100 mg  100 mg Oral QHS  midodrine (PROAMITINE) tablet 10 mg  10 mg Oral TID WITH MEALS  tamsulosin (FLOMAX) capsule 0.4 mg  0.4 mg Oral QHS  busPIRone (BUSPAR) tablet 10 mg  10 mg Oral TID  ALPRAZolam (XANAX) tablet 0.5 mg  0.5 mg Oral QID PRN  
 sodium chloride (NS) flush 5-40 mL  5-40 mL IntraVENous Q8H  
 loperamide (IMODIUM) capsule 4 mg  4 mg Oral QID PRN  
  sodium chloride 3% hypertonic nebulizer soln  4 mL Nebulization BID RT  
 pantothenic ac-min oil-pet,hyd (AQUAPHOR) 41 % ointment   Topical DAILY  traMADol (ULTRAM) tablet 50 mg  50 mg Oral Q6H PRN  
 sodium chloride (NS) flush 5-40 mL  5-40 mL IntraVENous Q8H  
 sodium chloride (NS) flush 5-40 mL  5-40 mL IntraVENous PRN  
 acetaminophen (TYLENOL) tablet 650 mg  650 mg Oral Q4H PRN  
 naloxone (NARCAN) injection 0.4 mg  0.4 mg IntraVENous PRN  
 ondansetron (ZOFRAN) injection 4 mg  4 mg IntraVENous Q4H PRN  
 bisacodyL (DULCOLAX) tablet 5 mg  5 mg Oral DAILY PRN Data Review:  
 
LABS:  
Recent Results (from the past 12 hour(s)) GLUCOSE, POC Collection Time: 03/02/20  5:23 AM  
Result Value Ref Range Glucose (POC) 112 (H) 65 - 100 mg/dL PROTHROMBIN TIME + INR Collection Time: 03/02/20  6:36 AM  
Result Value Ref Range Prothrombin time 31.0 (H) 12.0 - 14.7 sec INR 2.9 METABOLIC PANEL, BASIC Collection Time: 03/02/20  6:36 AM  
Result Value Ref Range Sodium 137 136 - 145 mmol/L Potassium 4.6 3.5 - 5.1 mmol/L Chloride 102 98 - 107 mmol/L  
 CO2 28 21 - 32 mmol/L Anion gap 7 7 - 16 mmol/L Glucose 111 (H) 65 - 100 mg/dL BUN 33 (H) 8 - 23 MG/DL Creatinine 2.40 (H) 0.8 - 1.5 MG/DL  
 GFR est AA 34 (L) >60 ml/min/1.73m2 GFR est non-AA 28 (L) >60 ml/min/1.73m2 Calcium 7.8 (L) 8.3 - 10.4 MG/DL  
GLUCOSE, POC Collection Time: 03/02/20 11:40 AM  
Result Value Ref Range Glucose (POC) 147 (H) 65 - 100 mg/dL Plan: Active Problems: 
  DM type 2 (diabetes mellitus, type 2) ([facility] (1/14/2013) HLD (hyperlipidemia) (1/14/2013) Obesity, morbid ([facility] (10/19/2018) [facility] (8/26/2019) CAD (coronary artery disease) (1/10/2020) S/P CABG x 3 (1/10/2020) S/P AVR (1/10/2020) Atrial fibrillation ([facility] (1/13/2020) HIT (heparin-induced thrombocytopenia) ([facility] (1/23/2020) Hypotension (2/18/2020) Acute renal failure on dialysis ([facility] [address] (2/25/2020) DNR (do not resuscitate) (2/26/2020) DEJUAN/CKD, likely ESRD - no evidence of renal recovery - ([place] TTS schedule) -HD tomorrow for volume and clearance 
  
HIT/SHARON + 
  
ASHD s/p CABG 
  
Orthostatic Hypotension - on midodrine Anemia - on IV Fe replacement

## 2020-03-02 NOTE — PROGRESS NOTES
CM called Christiano Beasley, liaison with [employer]. Christiano Beasley is going to contact [facility] admissions and find out if an appeal has been submitted. CM following.

## 2020-03-02 NOTE — PROGRESS NOTES
Warfarin dosing per pharmacist 
 
Ángel Terry is a 69 y.o. male. Height: 5' 10\" (177.8 cm)   Weight 103.5 kg (228 lbs) Indication:  AVR and afib Goal INR:  2.5 - 3.5 Home dose:  2.5 mg daily Risk factors or significant drug interactions: amiodarone (dc'd 2/5), Levofloxacin (2/13- 2/20 ), mirtazapine (2/13-2/24), escitalopram (2/19-2/20), trazodone (started 2/24) Other anticoagulants:  N/A Daily Monitoring Date  INR     Warfarin dose HGB              Notes 2/14  2.4  3 mg  8.7 2/15  2.2  4 mg  9.6 2/16  2.1  5 mg  9.8 2/17  2.1  5 mg  9.5 2/18  2.8  2 mg  9.3 2/19  2.5  2 mg  8.7 
2/20  1.9  3 mg   8.8 
2/21  1.9  4 mg  8.6 
2/22  2.1  3 mg  8.5 
2/23  1.9  4 mg  --- 
2/24  1.7  3 mg + 2 mg --- 
2/25  1.7  5 mg  --- 
2/26  1.6  6 mg  --- 
2/27  1.7  6 mg   --- 
2/28  1.7  7.5 mg  --- 
2/29  2.1 ` 6 mg  7.7 
3/1  2.0  7.5 mg  --- 
3/2  2.9  6 mg  --- 
 
INR is therapeutic today, however increased rapidly from 2.0 -> 2.9. Will reduce the dose slightly to 6 mg today, hesitant to reduce the dose further as the goal INR range is 2.5-3.5 and patient has shown to have labile INRs. Further dose adjustments based on INR trend. Pharmacy will continue to follow patient and monitor INR daily. Thank you, Tayler Valdez, PharmD, BCPS Clinical Pharmacy Specialist

## 2020-03-02 NOTE — PROGRESS NOTES
Problem: Self Care Deficits Care Plan (Adult) Goal: *Acute Goals and Plan of Care (Insert Text) Description 1. Patient will complete lower body bathing and dressing with Mod I and adaptive equipment as needed. 2. Patient will complete toileting with Mod I and adaptive equipment as needed. 3. Patient will tolerate 23 minutes of OT treatment with 2 rest breaks to increase activity tolerance for ADLs. 4. Patient will complete functional transfers with Mod I and adaptive equipment as needed. 5. Patient will complete functional transfer to sink to perform grooming with Mod I and adaptive equipment as needed. 6. Patient will perform pursed-lip breathing with one verbal and one visual cue to increase activity tolerance for performance of ADLs. Timeframe: 7 visits Outcome: Progressing Towards Goal 
 
OCCUPATIONAL THERAPY: Daily Note and AM 3/2/2020 INPATIENT: OT Visit Days: 3 Payor: Cecilia Kumar / Plan: Erica Mcqueen / Product Type: [employer] Medicare /  
  
NAME/AGE/GENDER: Elayne Rod is a 69 y.o. male PRIMARY DIAGNOSIS:  Acute respiratory failure ([facility] [address] [J96.00] Fluid overload [E87.70] Acute hypoxemic respiratory failure (HCC) Acute hypoxemic respiratory failure (HCC) Procedure(s) (LRB): BRONCHOSCOPY (N/A) BRONCHIAL ALVEOLAR LAVAGE (N/A) 14 Days Post-Op ICD-10: Treatment Diagnosis:  
 · Generalized Muscle Weakness (M62.81) · Difficulty in walking, Not elsewhere classified (R26.2) Precautions/Allergies: 
   Heparin; Amoxicillin; Keflex [cephalexin]; and Pcn [penicillins] ASSESSMENT:  
 
Mr. Jose Manuel Poe  is a 69 y.o. male admitted for Acute respiratory failure. At baseline, patient lives with wife in one story home with 2 ZACHARIAH. Wife home and available 24/7 if needed. Pt is typically Mod I to independent for performance of ADLs and IADLs. Pt typically uses no DME for in home or community mobility, but does have [address] available if needed.  Reports that he is able to walk approximately 200 feet without any DME before having to stop to take a rest break due to becoming SOB. Pt reports no prior fall history. 3/2/2020 Upon arrival, pt was sitting in chair. Pt stated he was depressed and if he was not going to get better then he wants to die. Pt completed the exercises below on B UE's. Minimal progress made due to rest breaks to catch is breath. Continue POC. Mayco Check is currently functioning below baseline for ADLs and functional mobility and would benefit from acute OT to increase independence and safety with ADLs and functional mobility. Will follow for duration of hospital stay to address the above goals. Patient was educated on role and plan of care of occupational therapy. This section established at most recent assessment PROBLEM LIST (Impairments causing functional limitations): 1. Decreased Strength 2. Decreased ADL/Functional Activities 3. Decreased Transfer Abilities 4. Decreased Ambulation Ability/Technique 5. Decreased Balance 6. Increased Pain 7. Decreased Activity Tolerance 8. Decreased Pacing Skills 9. Increased Fatigue 10. Increased Shortness of Breath 11. Decreased Skin Integrity/Hygeine INTERVENTIONS PLANNED: (Benefits and precautions of occupational therapy have been discussed with the patient.) 1. Activities of daily living training 2. Adaptive equipment training 3. Balance training 4. Clothing management 5. Hygiene training 6. Neuromuscular re-eduation 7. Re-evaluation 8. Therapeutic activity 9. Therapeutic exercise TREATMENT PLAN: Frequency/Duration: Follow patient 3x/week to address above goals. Rehabilitation Potential For Stated Goals: Fair REHAB RECOMMENDATIONS (at time of discharge pending progress):   
Placement:  
It is my opinion, based on this patient's performance to date, that Mr. Lula Polanco may benefit from intensive therapy at [address] after discharge due to the functional deficits listed above that are likely to improve with skilled rehabilitation and concerns that he/she may be unsafe to be unsupervised at home due to decreased independence, safety, and activity tolerance. .  
Equipment: ? TBD   
    
 
 
 
OCCUPATIONAL PROFILE AND HISTORY:  
History of Present Injury/Illness (Reason for Referral): 
See H & P Past Medical History/Comorbidities:  
Mr. Gregory Flores  has a past medical history of Arthritis, BPH (benign prostatic hyperplasia) (1/14/2013), CAD (coronary artery disease), DM type 2 (diabetes mellitus, type 2) ([facility] [address] (dx 2004), Dyspnea, Gout (1/14/2013), HLD (hyperlipidemia) (1/14/2013), HTN (hypertension), Morbid obesity ([facility] [address] (9/3/14), Psychiatric disorder, Rheumatic fever, Seasonal allergic rhinitis, Severe aortic valve stenosis, Thyroid disease, and Unspecified sleep apnea (2016). Mr. Gregory Flores  has a past surgical history that includes hx tonsil and adenoidectomy; hx heart catheterization (12/23/2019); hx orthopaedic (Left); hx cataract removal (Bilateral, 2012); and ir insert tunl cvc w port over 5 years (2/4/2020). Social History/Living Environment:  
Home Environment: Private residence # Steps to Enter: 2 Rails to Enter: No 
One/Two Story Residence: One story Living Alone: No 
Support Systems: Spouse/Significant Other/Partner Patient Expects to be Discharged to[de-identified] Rehabilitation facility Current DME Used/Available at Home: Cane, straight, Walker, rolling Tub or Shower Type: Shower Prior Level of Function/Work/Activity: At baseline, patient lives with wife in one story home with 2 ZACHARIAH. Wife home and available 24/7 if needed. Pt is typically Mod I to independent for performance of ADLs and IADLs. Pt typically uses no DME for in home or community mobility, but does have [facility] available if needed.  Reports that he is able to walk approximately 200 feet without any DME before having to stop to take a rest break due to becoming SOB. Pt reports no prior fall history. Personal Factors:   
      Sex:  male Age:  69 y.o. Number of Personal Factors/Comorbidities that affect the Plan of Care: Extensive review of physical, cognitive, and psychosocial performance (3+):  HIGH COMPLEXITY ASSESSMENT OF OCCUPATIONAL PERFORMANCE[de-identified]  
Activities of Daily Living:  
Basic ADLs (From Assessment) Complex ADLs (From Assessment) Feeding: Minimum assistance Oral Facial Hygiene/Grooming: Minimum assistance Bathing: Moderate assistance Upper Body Dressing: Minimum assistance Lower Body Dressing: Moderate assistance Toileting: Moderate assistance Instrumental ADL Meal Preparation: Total assistance Homemaking: Total assistance Grooming/Bathing/Dressing Activities of Daily Living Cognitive Retraining Safety/Judgement: Fall prevention Bed/Mat Mobility Sit to Supine: Supervision Sit to Stand: Contact guard assistance;Stand-by assistance Stand to Sit: Stand-by assistance Bed to Chair: Contact guard assistance Scooting: Supervision Most Recent Physical Functioning:  
Gross Assessment: 
  
         
  
Posture: 
Posture (WDL): Exceptions to [facility] Posture Assessment: Forward head, Rounded shoulders Balance: 
Sitting: Intact Standing: Impaired Standing - Static: Fair Standing - Dynamic : Fair Bed Mobility: 
Sit to Supine: Supervision Scooting: Supervision Wheelchair Mobility: 
  
Transfers: 
Sit to Stand: Contact guard assistance;Stand-by assistance Stand to Sit: Stand-by assistance Bed to Chair: Contact guard assistance Interventions: Verbal cues; Safety awareness training Duration: 13 Minutes Patient Vitals for the past 6 hrs: 
 BP BP Patient Position SpO2 O2 Flow Rate (L/min) Pulse 03/02/20 0615    4 l/min   
03/02/20 0729 (!) 82/51 At rest 93 %  88  
03/02/20 0730    4 l/min   
03/02/20 1104 (!) 76/39 Standing    20 1113 100/66 At rest 93 %  88 Mental Status Neurologic State: Alert Orientation Level: Oriented X4 Cognition: Appropriate for age attention/concentration, Appropriate decision making Perception: Appears intact Perseveration: No perseveration noted Safety/Judgement: Fall prevention Physical Skills Involved: 
1. Balance 2. Strength 3. Activity Tolerance 4. Fine Motor Control 5. Vision 6. Pain (acute) 7. Skin Integrity Cognitive Skills Affected (resulting in the inability to perform in a timely and safe manner): 1. Sustained Attention 2. Divided Attention Psychosocial Skills Affected: 1. Habits/Routines 2. Environmental Adaptation 3. Social Interaction Number of elements that affect the Plan of Care: 5+:  HIGH COMPLEXITY CLINICAL DECISION MAKIN[address] [place] [address] AM-PAC 6 Clicks Daily Activity Inpatient Short Form How much help from another person does the patient currently need. .. Total A Lot A Little None 1. Putting on and taking off regular lower body clothing? [] 1   [x] 2   [] 3   [] 4  
2. Bathing (including washing, rinsing, drying)? [] 1   [x] 2   [] 3   [] 4  
3. Toileting, which includes using toilet, bedpan or urinal?   [] 1   [x] 2   [] 3   [] 4  
4. Putting on and taking off regular upper body clothing? [] 1   [] 2   [x] 3   [] 4  
5. Taking care of personal grooming such as brushing teeth? [] 1   [] 2   [x] 3   [] 4  
6. Eating meals? [] 1   [] 2   [x] 3   [] 4  
© , Trustees of [address] [place], [place] [address], under license to [employer]. All rights reserved Score:  Initial: 15, completed, 2/15/2020 Most Recent: X (Date: -- ) Interpretation of Tool:  Represents activities that are increasingly more difficult (i.e. Bed mobility, Transfers, Gait). Medical Necessity:    
· Skilled intervention continues to be required due to decreased independence, safety, and activity tolerance.  In addition, pt having increased dizziness that is limiting pt ability to perform ADLs and functional mobility. Rosales Laws Reason for Services/Other Comments: 
· Patient continues to require skilled intervention due to medical complications and patient unable to attend/participate in therapy as expected. Use of outcome tool(s) and clinical judgement create a POC that gives a: MODERATE COMPLEXITY  
 
 
 
TREATMENT:  
(In addition to Assessment/Re-Assessment sessions the following treatments were rendered) Pre-treatment Symptoms/Complaints: \"This is something new that's been happening since I've been short of breath. \" In regards to pt having dizzy spells. Pain: Initial:  
Pain Intensity 1:0 Post Session:  Unchanged Therapeutic Exercise: (  23):  Exercises per grid below to improve mobility and strength. Required min verbal cues to promote proper body posture and promote proper body mechanics. Progressed range and repetitions as indicated. B UE's  Date: 
3/2/2020 Date: 
 Date: Activity/Exercise Parameters Parameters Parameters Shoulder flex/ex 20 reps Shoulder hor abd/add 20 reps Punches  20 reps Elbow flex/ex 20 reps Braces/Orthotics/Lines/Etc:  
· Telemetry Monitor · O2 Device: Nasal cannula Treatment/Session Assessment:   
· Response to Treatment:  Pt very impulsive throughout session. · Interdisciplinary Collaboration:  
o Certified Occupational Therapy Assistant 
o Registered Nurse · After treatment position/precautions:  
o Up in chair 
o Bed/Chair-wheels locked 
o Bed in low position 
o Call light within reach 
o RN notified · Compliance with Program/Exercises: Compliant most of the time, Will assess as treatment progresses. · Recommendations/Intent for next treatment session: \"Next visit will focus on advancements to more challenging activities and reduction in assistance provided\". Total Treatment Duration: OT Patient Time In/Time Out Time In: 1020 Time Out: 1043 Maude Leonardo

## 2020-03-02 NOTE — PROGRESS NOTES
Bedside shift report completed with oncoming nurse, Ellen Infante. Patient sitting up in recliner at this time, awake, respirations even and unlabored. Denies needs at this time. Bed low and locked. Bedside table, personal belongings and call light within reach.

## 2020-03-03 NOTE — DIALYSIS
TRANSFER IN - DIALYSIS Received patient in dialysis unit  from [facility](unit) for ordered procedure. Consent verified for renal replacement therapy. Patient caox4 and vital signs stable. /57 P82 Hemodialysis initiated using LTPC. Aspirated and flushed both ports without difficulty. Dressing clean, dry and intact. Machine settings per MD order. Will monitor during treatment.

## 2020-03-03 NOTE — PROGRESS NOTES
PT note: Patient off floor this afternoon when therapy attempted. Will check back as schedule allows.   
 
QUOC NietoT

## 2020-03-03 NOTE — PROGRESS NOTES
Patient resting in bed, watching TV, noted decrease of agitation. Requested medication for anxiety, clonazepam 0.5 mg po given per prn order. Call light in reach, door open for observation.

## 2020-03-03 NOTE — PROGRESS NOTES
Hospitalist Note Admit Date:  2020  3:03 PM  
Name:  Renea Menard Age:  69 y.o. 
:  1945 MRN:  [id number] PCP:  Shalini Haas MD 
Treatment Team: Attending Provider: Carla Mccabe MD; Consulting Provider: Raquel Weinstein MD; Utilization Review: Lonnie Rodgers, RN; Hospitalist: Winifred Constantino NP; Hospitalist: Geraldo Dougherty NP; Consulting Provider: Deborah Yin MD; Consulting Provider: Lexus Diamond MD; Care Manager: Kamala Puentes; Staff Nurse: Alexis Turcios RN; Physical Therapist: Kacey Avila DPT 
 
HPI/Subjective:  
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  
  
CXR showed interstitial edema/ left pleural effusion. CTA chest no PE. He completed a course of levaquin 20. He was seen by pulmonary s/p bronch 20. He had some hypotension started on midodrine. He had worsening depression, seen by tele psyche for suicidal ideation with sitter that resolved. Discharge plans pending due to recurrent insurance denials 3/3 Patient seen and examined at bedside in no acute distress. Patient reports diarrhea. He reports he is having greater than 10 bowel movements per day. He reports he feels like he is going every hour. In addition he reports he feels very lightheaded upon standing with mild vision changes. Denies any chest pain or shortness of breath. He is frustrated with his hospital stay. Objective:  
 
Patient Vitals for the past 24 hrs: 
 Temp Pulse Resp BP SpO2  
20 1403  81  117/54   
20 1342  83  99/54   
20 1323  83  101/50   
20 1303  82  104/61   
20 1230  80  (!) 155/36   
20 1218  82  105/57   
20 1114 98 °F (36.7 °C) 86 20 119/59 91 % 03/03/20 1006     93 % 03/03/20 0721 97.9 °F (36.6 °C) 80 20 100/60 93 % 03/03/20 0315 97.9 °F (36.6 °C) 77 20 134/66 92 % 03/02/20 2304 97.8 °F (36.6 °C) 81 21 126/66 91 % 03/02/20 2044 97.6 °F (36.4 °C) 99 20 118/61 99 % 03/02/20 1515 97.8 °F (36.6 °C) 80 18 98/68 96 % 03/02/20 1453     96 % Oxygen Therapy O2 Sat (%): 91 % (03/03/20 1114) Pulse via Oximetry: 83 beats per minute (03/03/20 1006) O2 Device: Nasal cannula (03/03/20 1006) O2 Flow Rate (L/min): 3 l/min (03/03/20 1006) FIO2 (%): 21 % (02/23/20 1308) Estimated body mass index is 35.51 kg/m² as calculated from the following: 
  Height as of this encounter: 5' 10\" (1.778 m). Weight as of this encounter: 112.3 kg (247 lb 8 oz). Intake/Output Summary (Last 24 hours) at 3/3/2020 1409 Last data filed at 3/3/2020 1828 Gross per 24 hour Intake 480 ml Output 325 ml Net 155 ml *Note that automatically entered I/Os may not be accurate; dependent on patient compliance with collection and accurate  by techs. General:    Elderly male no acute distress CV:   RRR. No murmur, rub, or gallop. Lungs:   CTAB. No wheezing, rhonchi, or rales. Abdomen:   Soft, nontender, nondistended. Extremities: Warm and dry. No cyanosis or edema. Skin:     No rashes or jaundice. Neuro:  No gross focal deficits Data Review: 
I have reviewed all labs, meds, and studies from the last 24 hours: 
 
Recent Results (from the past 24 hour(s)) GLUCOSE, POC Collection Time: 03/02/20  4:50 PM  
Result Value Ref Range Glucose (POC) 206 (H) 65 - 100 mg/dL GLUCOSE, POC Collection Time: 03/02/20  8:44 PM  
Result Value Ref Range Glucose (POC) 216 (H) 65 - 100 mg/dL GLUCOSE, POC Collection Time: 03/03/20  5:13 AM  
Result Value Ref Range Glucose (POC) 119 (H) 65 - 100 mg/dL PROTHROMBIN TIME + INR Collection Time: 03/03/20  7:17 AM  
Result Value Ref Range Prothrombin time 35.1 (H) 12.0 - 14.7 sec INR 3.4    
CBC W/O DIFF Collection Time: 03/03/20  7:17 AM  
Result Value Ref Range WBC 9.5 4.3 - 11.1 K/uL  
 RBC 2.77 (L) 4.23 - 5.6 M/uL HGB 8.0 (L) 13.6 - 17.2 g/dL HCT 26.0 (L) 41.1 - 50.3 % MCV 93.9 79.6 - 97.8 FL  
 MCH 28.9 26.1 - 32.9 PG  
 MCHC 30.8 (L) 31.4 - 35.0 g/dL  
 RDW 14.1 11.9 - 14.6 % PLATELET 882 229 - 255 K/uL MPV 9.5 9.4 - 12.3 FL ABSOLUTE NRBC 0.00 0.0 - 0.2 K/uL METABOLIC PANEL, BASIC Collection Time: 03/03/20  7:17 AM  
Result Value Ref Range Sodium 138 136 - 145 mmol/L Potassium 4.7 3.5 - 5.1 mmol/L Chloride 104 98 - 107 mmol/L  
 CO2 27 21 - 32 mmol/L Anion gap 7 7 - 16 mmol/L Glucose 101 (H) 65 - 100 mg/dL BUN 37 (H) 8 - 23 MG/DL Creatinine 2.57 (H) 0.8 - 1.5 MG/DL  
 GFR est AA 32 (L) >60 ml/min/1.73m2 GFR est non-AA 26 (L) >60 ml/min/1.73m2 Calcium 8.2 (L) 8.3 - 10.4 MG/DL  
GLUCOSE, POC Collection Time: 03/03/20 11:01 AM  
Result Value Ref Range Glucose (POC) 188 (H) 65 - 100 mg/dL All Micro Results Procedure Component Value Units Date/Time CULTURE, RESPIRATORY/SPUTUM/BRONCH Brendia Began [[id number]] Collected:  02/17/20 1220 Order Status:  Completed Specimen:  Sputum from Bronchial Washing Updated:  02/24/20 1244 Special Requests: NO SPECIAL REQUESTS     
  GRAM STAIN 20 TO 35 WBCS SEEN PER OIF  
   1 TO 2 EPITHELIAL CELLS SEEN PER OIF  
      
  MODERATE GRAM POSITIVE COCCI  
     
   FEW GRAM POSITIVE RODS Culture result:    
  LIGHT NORMAL RESPIRATORY BRENNAN No results found for this visit on 02/13/20. Current Meds: 
Current Facility-Administered Medications Medication Dose Route Frequency  warfarin (COUMADIN) tablet 5 mg  5 mg Oral QPM  
 insulin lispro (HUMALOG) injection 8 Units  8 Units SubCUTAneous TIDAC  ferric gluconate (FERRLECIT) 125 mg in 0.9% sodium chloride 100 mL IVPB  125 mg IntraVENous DAILY  epoetin maritza-epbx (RETACRIT) 14,000 Units combo injection  14,000 Units SubCUTAneous Q7D  
 insulin glargine (LANTUS) injection 45 Units  45 Units SubCUTAneous QHS  insulin lispro (HUMALOG) injection   SubCUTAneous AC&HS  
 albuterol (PROVENTIL VENTOLIN) nebulizer solution 2.5 mg  2.5 mg Nebulization Q6HWA RT  
 polyethylene glycol (MIRALAX) packet 17 g  17 g Oral DAILY  clonazePAM (KlonoPIN) tablet 0.5 mg  0.5 mg Oral QHS PRN  
 traZODone (DESYREL) tablet 100 mg  100 mg Oral QHS  midodrine (PROAMITINE) tablet 10 mg  10 mg Oral TID WITH MEALS  tamsulosin (FLOMAX) capsule 0.4 mg  0.4 mg Oral QHS  busPIRone (BUSPAR) tablet 10 mg  10 mg Oral TID  ALPRAZolam (XANAX) tablet 0.5 mg  0.5 mg Oral QID PRN  
 sodium chloride (NS) flush 5-40 mL  5-40 mL IntraVENous Q8H  
 loperamide (IMODIUM) capsule 4 mg  4 mg Oral QID PRN  
 sodium chloride 3% hypertonic nebulizer soln  4 mL Nebulization BID RT  
 pantothenic ac-min oil-pet,hyd (AQUAPHOR) 41 % ointment   Topical DAILY  traMADol (ULTRAM) tablet 50 mg  50 mg Oral Q6H PRN  
 sodium chloride (NS) flush 5-40 mL  5-40 mL IntraVENous Q8H  
 sodium chloride (NS) flush 5-40 mL  5-40 mL IntraVENous PRN  
 acetaminophen (TYLENOL) tablet 650 mg  650 mg Oral Q4H PRN  
 naloxone (NARCAN) injection 0.4 mg  0.4 mg IntraVENous PRN  
 ondansetron (ZOFRAN) injection 4 mg  4 mg IntraVENous Q4H PRN  
 bisacodyL (DULCOLAX) tablet 5 mg  5 mg Oral DAILY PRN Other Studies (last 24 hours): No results found. Assessment and Plan:  
 
Hospital Problems as of 3/3/2020 Date Reviewed: 2/24/2020 Codes Class Noted - Resolved POA  
 DNR (do not resuscitate) (Chronic) ICD-10-CM: P92 ICD-9-CM: V49.86  2/26/2020 - Present Yes Acute renal failure on dialysis [facility]) ICD-10-CM: N17.9, Z99.2 ICD-9-CM: 584.9, V45.11  2/25/2020 - Present Yes Hypotension (Chronic) ICD-10-CM: I95.9 ICD-9-CM: 458.9  2/18/2020 - Present Yes HIT (heparin-induced thrombocytopenia) (HCC) (Chronic) ICD-10-CM: X52.67 ICD-9-CM: 289.84  1/23/2020 - Present Yes Atrial fibrillation (HCC) (Chronic) ICD-10-CM: I48.91 
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 
ICD-9-CM: 250.40, 583.81  8/26/2019 - Present Yes [facility] (Chronic) ICD-10-CM: E66.01 
ICD-9-CM: 278.01  10/19/2018 - Present Yes DM type 2 (diabetes mellitus, type 2) (HCC) (Chronic) ICD-10-CM: E11.9 ICD-9-CM: 250.00  1/14/2013 - Present Yes HLD (hyperlipidemia) (Chronic) ICD-10-CM: G46.7 ICD-9-CM: 272.4  1/14/2013 - Present Yes RESOLVED: Acute-on-chronic kidney injury ([facility] ICD-10-CM: N17.9, N18.9 ICD-9-CM: 584.9, 585.9  2/17/2020 - 2/25/2020 Yes RESOLVED: Atelectasis ICD-10-CM: J98.11 ICD-9-CM: 518.0  2/17/2020 - 2/25/2020 Yes RESOLVED: Fluid overload ICD-10-CM: E87.70 ICD-9-CM: 276.69  2/15/2020 - 2/25/2020 Yes RESOLVED: HCAP (healthcare-associated pneumonia) ICD-10-CM: J18.9 ICD-9-CM: 435  2/14/2020 - 2/25/2020 Yes RESOLVED: Pleural effusion ICD-10-CM: J90 ICD-9-CM: 511.9  2/3/2020 - 2/25/2020 Yes * (Principal) RESOLVED: Acute hypoxemic respiratory failure ([facility] [address] ICD-10-CM: J96.01 
ICD-9-CM: 518.81  1/10/2020 - 2/25/2020 Yes RESOLVED: HTN (hypertension) ICD-10-CM: I10 
ICD-9-CM: 401.9  1/14/2013 - 2/25/2020 Yes Plan: 
 
ESRD on HD 
TTS Plan: 
-HD as per nephro Orthostatic hypotension 
-Midrin 10 mg 3 times daily Diabetes mellitus 
-Hold home medications 
-POC before meals and at bedtime 
-Insulin sliding scale -Long-acting insulin: 45 units Lantus DVT 
-Continue warfarin CAD Status post CABG and AVR 1/10/2020 Atrial Fibrillation Currently Rate Controlled Anticoagulated with Coumadin Plan: 
-Continue Home Meds Depression No longer suicidal 
Denies suicidal homicidal ideation Plan: 
-Continue BuSpar 10 mg 3 times daily DC planning/Dispo:   Placement pending Diet:  DIET DIABETIC CONSISTENT CARB 
DIET NUTRITIONAL SUPPLEMENTS 
DVT ppx: Coumadin Signed: 
Raquel Martínez MD

## 2020-03-03 NOTE — PROGRESS NOTES
Appetite  Good. No change in assessment. Dressings remain  D/i sacrum, left thigh, all toes black in color. . No stools reported. Left permcath intact, site healthy. Left hw intact, site healthy. Rails up x2, door open.

## 2020-03-03 NOTE — PROGRESS NOTES
Alert and oriented   Denies pain  Laying on left side  Patient can turn self  Sacral dressing intact  Bilateral feet dressings intact  Wearing oxygen at 3l via cannula

## 2020-03-03 NOTE — PROGRESS NOTES
Patient was asleep off and on through night, frequently called for bedpan. Voiding little, leo, concentrated urine. Dressing to coccyx changed per order, tolerated well. Fall precautions continue.   Will give report to oncoming RN

## 2020-03-03 NOTE — DIALYSIS
TRANSFER OUT -DIALYSIS Hemodialysis treatment completed without complications. Patient stable and VS stable  /61  P 81   
 
 1.2 Kgs removed. Needles X2 removed from access and manual pressure held until hemostasis complete and pressure dressing applied. Patient to 837 after dialysis.

## 2020-03-03 NOTE — PROGRESS NOTES
Warfarin dosing per pharmacist 
 
Quincycorrie Hess is a 69 y.o. male. Height: 5' 10\" (177.8 cm)   Weight 103.5 kg (228 lbs) Indication:  AVR and afib Goal INR:  2.5 - 3.5 Home dose:  2.5 mg daily Risk factors or significant drug interactions: amiodarone (dc'd 2/5), Levofloxacin (2/13- 2/20 ), mirtazapine (2/13-2/24), escitalopram (2/19-2/20), trazodone (started 2/24) Other anticoagulants:  N/A Daily Monitoring Date  INR     Warfarin dose HGB              Notes 2/14  2.4  3 mg  8.7 2/15  2.2  4 mg  9.6 2/16  2.1  5 mg  9.8 2/17  2.1  5 mg  9.5 2/18  2.8  2 mg  9.3 2/19  2.5  2 mg  8.7 
2/20  1.9  3 mg   8.8 
2/21  1.9  4 mg  8.6 
2/22  2.1  3 mg  8.5 
2/23  1.9  4 mg  --- 
2/24  1.7  3 mg + 2 mg --- 
2/25  1.7  5 mg  --- 
2/26  1.6  6 mg  --- 
2/27  1.7  6 mg   --- 
2/28  1.7  7.5 mg  --- 
2/29  2.1 ` 6 mg  7.7 
3/1  2.0  7.5 mg  --- 
3/2  2.9  6 mg  --- 
3/3  3.4  5 mg  8.0 INR is therapeutic today, has increased a little more to 3.4 today. Earlier in the admission, the patient has been subtherapeutic on lower doses. The further increase today may still be the result from the 7.5 mg dose on 3/1. Will give 5 mg today. Further dose adjustments based on INR trend. Pharmacy will continue to follow patient and monitor INR daily. Thank you, Kellee Hurd, PharmD, BCPS Clinical Pharmacy Specialist 
[phone]

## 2020-03-03 NOTE — PROGRESS NOTES
Interdisciplinary team rounds were held 3/3/2020 with the following team members:Care Management, Physical Therapy, Physician and Clinical Coordinator and the patient. Plan of care discussed. See clinical pathway and/or care plan for interventions and desired outcomes.

## 2020-03-03 NOTE — PROGRESS NOTES
CM spoke with Darling Hernandez, liaison with [facility]. Darling Hernandez will send the most current PT/OT notes to [facility] in efforts to get patient approved for rehab.

## 2020-03-03 NOTE — PROGRESS NOTES
Renal Progress Note Admission Date: 2/13/2020 Subjective:  
  
Patient seen and examined on HD, dialyzing via left  Qb, UF 3100, complaints of shortness of breath today requiring increase in O2 needs, tolerating UF. Labs and chart reviewed ROS:  
General; no fever/ chills, appetite ok Lung: + SOB 
CV: no CP 
GI: no N/V/D Ext: no edema Objective:  
 
Physical Exam:   
Patient Vitals for the past 8 hrs: 
 BP Temp Pulse Resp SpO2 Weight 03/03/20 1230 (!) 155/36  80     
03/03/20 1218 105/57  82     
03/03/20 1114 119/59 98 °F (36.7 °C) 86 20 91 %   
03/03/20 1006     93 %   
03/03/20 0721 100/60 97.9 °F (36.6 °C) 80 20 93 % 112.3 kg (247 lb 8 oz) Gen: comfortable , NAD HEENT: moist membranes CV: S1, S2, regular rate, no Rub Lungs: Clear bilaterally, no wheezing GI: soft, non-tender, + BS Extem: mild edema, no cyanosis Access: left TCC intact Current Facility-Administered Medications Medication Dose Route Frequency  warfarin (COUMADIN) tablet 5 mg  5 mg Oral QPM  
 insulin lispro (HUMALOG) injection 8 Units  8 Units SubCUTAneous TIDAC  ferric gluconate (FERRLECIT) 125 mg in 0.9% sodium chloride 100 mL IVPB  125 mg IntraVENous DAILY  epoetin maritza-epbx (RETACRIT) 14,000 Units combo injection  14,000 Units SubCUTAneous Q7D  
 insulin glargine (LANTUS) injection 45 Units  45 Units SubCUTAneous QHS  insulin lispro (HUMALOG) injection   SubCUTAneous AC&HS  
 albuterol (PROVENTIL VENTOLIN) nebulizer solution 2.5 mg  2.5 mg Nebulization Q6HWA RT  
 polyethylene glycol (MIRALAX) packet 17 g  17 g Oral DAILY  clonazePAM (KlonoPIN) tablet 0.5 mg  0.5 mg Oral QHS PRN  
 traZODone (DESYREL) tablet 100 mg  100 mg Oral QHS  midodrine (PROAMITINE) tablet 10 mg  10 mg Oral TID WITH MEALS  tamsulosin (FLOMAX) capsule 0.4 mg  0.4 mg Oral QHS  busPIRone (BUSPAR) tablet 10 mg  10 mg Oral TID  ALPRAZolam (XANAX) tablet 0.5 mg  0.5 mg Oral QID PRN  
  sodium chloride (NS) flush 5-40 mL  5-40 mL IntraVENous Q8H  
 loperamide (IMODIUM) capsule 4 mg  4 mg Oral QID PRN  
 sodium chloride 3% hypertonic nebulizer soln  4 mL Nebulization BID RT  
 pantothenic ac-min oil-pet,hyd (AQUAPHOR) 41 % ointment   Topical DAILY  traMADol (ULTRAM) tablet 50 mg  50 mg Oral Q6H PRN  
 sodium chloride (NS) flush 5-40 mL  5-40 mL IntraVENous Q8H  
 sodium chloride (NS) flush 5-40 mL  5-40 mL IntraVENous PRN  
 acetaminophen (TYLENOL) tablet 650 mg  650 mg Oral Q4H PRN  
 naloxone (NARCAN) injection 0.4 mg  0.4 mg IntraVENous PRN  
 ondansetron (ZOFRAN) injection 4 mg  4 mg IntraVENous Q4H PRN  
 bisacodyL (DULCOLAX) tablet 5 mg  5 mg Oral DAILY PRN Data Review:  
 
LABS:  
Recent Results (from the past 12 hour(s)) GLUCOSE, POC Collection Time: 03/03/20  5:13 AM  
Result Value Ref Range Glucose (POC) 119 (H) 65 - 100 mg/dL PROTHROMBIN TIME + INR Collection Time: 03/03/20  7:17 AM  
Result Value Ref Range Prothrombin time 35.1 (H) 12.0 - 14.7 sec INR 3.4    
CBC W/O DIFF Collection Time: 03/03/20  7:17 AM  
Result Value Ref Range WBC 9.5 4.3 - 11.1 K/uL  
 RBC 2.77 (L) 4.23 - 5.6 M/uL HGB 8.0 (L) 13.6 - 17.2 g/dL HCT 26.0 (L) 41.1 - 50.3 % MCV 93.9 79.6 - 97.8 FL  
 MCH 28.9 26.1 - 32.9 PG  
 MCHC 30.8 (L) 31.4 - 35.0 g/dL  
 RDW 14.1 11.9 - 14.6 % PLATELET 606 248 - 613 K/uL MPV 9.5 9.4 - 12.3 FL ABSOLUTE NRBC 0.00 0.0 - 0.2 K/uL METABOLIC PANEL, BASIC Collection Time: 03/03/20  7:17 AM  
Result Value Ref Range Sodium 138 136 - 145 mmol/L Potassium 4.7 3.5 - 5.1 mmol/L Chloride 104 98 - 107 mmol/L  
 CO2 27 21 - 32 mmol/L Anion gap 7 7 - 16 mmol/L Glucose 101 (H) 65 - 100 mg/dL BUN 37 (H) 8 - 23 MG/DL Creatinine 2.57 (H) 0.8 - 1.5 MG/DL  
 GFR est AA 32 (L) >60 ml/min/1.73m2 GFR est non-AA 26 (L) >60 ml/min/1.73m2  Calcium 8.2 (L) 8.3 - 10.4 MG/DL  
GLUCOSE, POC  
 Collection Time: 03/03/20 11:01 AM  
Result Value Ref Range Glucose (POC) 188 (H) 65 - 100 mg/dL Plan: Active Problems: 
  DM type 2 (diabetes mellitus, type 2) ([address] (1/14/2013) HLD (hyperlipidemia) (1/14/2013) Obesity, morbid ([address] (10/19/2018) Type 2 diabetes with nephropathy ([address] (8/26/2019) CAD (coronary artery disease) (1/10/2020) S/P CABG x 3 (1/10/2020) S/P AVR (1/10/2020) Atrial fibrillation ([address] (1/13/2020) HIT (heparin-induced thrombocytopenia) ([address] (1/23/2020) Hypotension (2/18/2020) Acute renal failure on dialysis ([address] (2/25/2020) DNR (do not resuscitate) (2/26/2020) DEJUAN/CKD, likely ESRD - no evidence of renal recovery - ([place] TTS schedule) -seen on HD, tolerating UF, catheter functioning well- UF as tolerated 
  
HIT/SHARON + 
  
ASHD s/p CABG 
  
Orthostatic Hypotension - on midodrine Anemia - on IV Fe replacement

## 2020-03-04 NOTE — PROGRESS NOTES
Ppd right arm negative. Son at bedside. Questions answered re tx and tx plan. Left permcath remains intact, site healthy. Remains on oxygen at 3lpm. Dressing coccyx, remains intact, left thight dressing intact, dressing bilateral toes remains intact with small amt bleeding noted thru. In bed resting turned off coccyx. Rails up x2, door open

## 2020-03-04 NOTE — INTERDISCIPLINARY ROUNDS
Interdisciplinary team rounds were held 3/4/2020 with the following team members:Care Management, Physical Therapy, Physician and Clinical Coordinator and the patient. Plan of care discussed. See clinical pathway and/or care plan for interventions and desired outcomes.

## 2020-03-04 NOTE — PROGRESS NOTES
Problem: Self Care Deficits Care Plan (Adult) Goal: *Acute Goals and Plan of Care (Insert Text) Description 1. Patient will complete lower body bathing and dressing with Mod I and adaptive equipment as needed. 2. Patient will complete toileting with Mod I and adaptive equipment as needed. 3. Patient will tolerate 23 minutes of OT treatment with 2 rest breaks to increase activity tolerance for ADLs. 4. Patient will complete functional transfers with Mod I and adaptive equipment as needed. 5. Patient will complete functional transfer to sink to perform grooming with Mod I and adaptive equipment as needed. 6. Patient will perform pursed-lip breathing with one verbal and one visual cue to increase activity tolerance for performance of ADLs. Timeframe: 7 visits Outcome: Progressing Towards Goal 
 
OCCUPATIONAL THERAPY: Daily Note and PM  
 3/4/2020 INPATIENT: OT Visit Days: 4 Payor: Clarisa Kearney / Plan: Derek Velasco / Product Type: Managed Care Medicare /  
  
NAME/AGE/GENDER: Martha Mendoza is a 69 y.o. male PRIMARY DIAGNOSIS:  Acute respiratory failure ([facility] [address] [J96.00] Fluid overload [E87.70] Acute hypoxemic respiratory failure (HCC) Acute hypoxemic respiratory failure (HCC) Procedure(s) (LRB): BRONCHOSCOPY (N/A) BRONCHIAL ALVEOLAR LAVAGE (N/A) 16 Days Post-Op ICD-10: Treatment Diagnosis:  
 · Generalized Muscle Weakness (M62.81) · Difficulty in walking, Not elsewhere classified (R26.2) Precautions/Allergies: 
   Heparin; Amoxicillin; Keflex [cephalexin]; and Pcn [penicillins] ASSESSMENT:  
 
Mr. Charmaine Tavarez  is a 69 y.o. male admitted for Acute respiratory failure. At baseline, patient lives with wife in one story home with 2 ZACHARIAH. Wife home and available 24/7 if needed. Pt is typically Mod I to independent for performance of ADLs and IADLs. Pt typically uses no DME for in home or community mobility, but does have [facility] available if needed.  Reports that he is able to walk approximately 200 feet without any DME before having to stop to take a rest break due to becoming SOB. Pt reports no prior fall history. 3/4/2020 Pt presents in supine upon arrival. Pt's BP was monitored throughout treatment due to being orthostatic. Pt transferred to sitting with mod a and additional time. Pt sat edge of bed as BP was checked since supine position. His BP dropped from 103/52 in supine to 96/51 in sitting. Pt stated that he needed to use the bathroom. Pt stood and his BP was re-taken and dropped to 82/40. Pt was adamant anyway about walking to the [facility] and did so with a rolling walker and CGA x's 2 for safety. Pt completed toileting with SBA. Pt was becoming more symptomatic which is unfortunate because pt really wanted to mobilize more. Pt was able to safely walk to the chair with CGA x's 2 for safety and was left up in the chair with belongings in reach. Nursing staff notified. Continue POC. This section established at most recent assessment PROBLEM LIST (Impairments causing functional limitations): 1. Decreased Strength 2. Decreased ADL/Functional Activities 3. Decreased Transfer Abilities 4. Decreased Ambulation Ability/Technique 5. Decreased Balance 6. Increased Pain 7. Decreased Activity Tolerance 8. Decreased Pacing Skills 9. Increased Fatigue 10. Increased Shortness of Breath 11. Decreased Skin Integrity/Hygeine INTERVENTIONS PLANNED: (Benefits and precautions of occupational therapy have been discussed with the patient.) 1. Activities of daily living training 2. Adaptive equipment training 3. Balance training 4. Clothing management 5. Hygiene training 6. Neuromuscular re-eduation 7. Re-evaluation 8. Therapeutic activity 9. Therapeutic exercise TREATMENT PLAN: Frequency/Duration: Follow patient 3x/week to address above goals. Rehabilitation Potential For Stated Goals: Fair REHAB RECOMMENDATIONS (at time of discharge pending progress):   
Placement: It is my opinion, based on this patient's performance to date, that Mr. Alaina Mcnally may benefit from intensive therapy at a [facility] after discharge due to the functional deficits listed above that are likely to improve with skilled rehabilitation and concerns that he/she may be unsafe to be unsupervised at home due to decreased independence, safety, and activity tolerance. .  
Equipment: ? TBD   
    
 
 
 
OCCUPATIONAL PROFILE AND HISTORY:  
History of Present Injury/Illness (Reason for Referral): 
See H & P Past Medical History/Comorbidities:  
Mr. Alaina Mcnally  has a past medical history of Arthritis, BPH (benign prostatic hyperplasia) (1/14/2013), CAD (coronary artery disease), DM type 2 (diabetes mellitus, type 2) ([facility] [address] (dx 2004), Dyspnea, Gout (1/14/2013), HLD (hyperlipidemia) (1/14/2013), HTN (hypertension), Morbid obesity ([facility] [address] (9/3/14), Psychiatric disorder, Rheumatic fever, Seasonal allergic rhinitis, Severe aortic valve stenosis, Thyroid disease, and Unspecified sleep apnea (2016). Mr. Alaina Mcnally  has a past surgical history that includes hx tonsil and adenoidectomy; hx heart catheterization (12/23/2019); hx orthopaedic (Left); hx cataract removal (Bilateral, 2012); and ir insert tunl cvc w port over 5 years (2/4/2020). Social History/Living Environment:  
Home Environment: Private residence # Steps to Enter: 2 Rails to Enter: No 
One/Two Story Residence: One story Living Alone: No 
Support Systems: Spouse/Significant Other/Partner Patient Expects to be Discharged to[de-identified] Rehabilitation facility Current DME Used/Available at Home: Cane, straight, Walker, rolling Tub or Shower Type: Shower Prior Level of Function/Work/Activity: At baseline, patient lives with wife in one story home with 2 ZACHARIAH. Wife home and available 24/7 if needed.  Pt is typically Mod I to independent for performance of ADLs and IADLs. Pt typically uses no DME for in home or community mobility, but does have [facility] available if needed. Reports that he is able to walk approximately 200 feet without any DME before having to stop to take a rest break due to becoming SOB. Pt reports no prior fall history. Personal Factors:   
      Sex:  male Age:  69 y.o. Number of Personal Factors/Comorbidities that affect the Plan of Care: Extensive review of physical, cognitive, and psychosocial performance (3+):  HIGH COMPLEXITY ASSESSMENT OF OCCUPATIONAL PERFORMANCE[de-identified]  
Activities of Daily Living:  
Basic ADLs (From Assessment) Complex ADLs (From Assessment) Feeding: Minimum assistance Oral Facial Hygiene/Grooming: Minimum assistance Bathing: Moderate assistance Upper Body Dressing: Minimum assistance Lower Body Dressing: Moderate assistance Toileting: Moderate assistance Instrumental ADL Meal Preparation: Total assistance Homemaking: Total assistance Grooming/Bathing/Dressing Activities of Daily Living Toileting Toileting Assistance: Stand-by assistance Clothing Management: Total assistance (dependent) Functional Transfers Toilet Transfer : Contact guard assistance Bed/Mat Mobility Supine to Sit: Moderate assistance Sit to Stand: Contact guard assistance;Minimum assistance Stand to Sit: Contact guard assistance Bed to Chair: Minimum assistance Scooting: Contact guard assistance Most Recent Physical Functioning:  
Gross Assessment: 
  
         
  
Posture: 
Posture (WDL): Exceptions to [facility] Posture Assessment: Forward head, Rounded shoulders Balance: 
Sitting: Impaired Sitting - Static: Good (unsupported) Sitting - Dynamic: Fair (occasional) Standing: Impaired Standing - Static: Fair(-) Standing - Dynamic : Fair(-) Bed Mobility: 
Supine to Sit: Moderate assistance Scooting: Contact guard assistance Wheelchair Mobility: 
  
Transfers: Sit to Stand: Contact guard assistance;Minimum assistance Stand to Sit: Contact guard assistance Bed to Chair: Minimum assistance Interventions: Verbal cues; Visual cues; Safety awareness training; Tactile cues;Manual cues Duration: 23 Minutes Patient Vitals for the past 6 hrs: 
 BP BP Patient Position SpO2 O2 Flow Rate (L/min) Pulse 03/04/20 0849   91 % 3 l/min   
03/04/20 1139 102/61 At rest 93 %  88  
03/04/20 1305 103/53 Supine 90 % 3 l/min  Mental Status Neurologic State: Alert, Eyes open spontaneously Orientation Level: Oriented X4 Cognition: Follows commands Perception: Appears intact Perseveration: No perseveration noted Safety/Judgement: Fall prevention Physical Skills Involved: 
1. Balance 2. Strength 3. Activity Tolerance 4. Fine Motor Control 5. Vision 6. Pain (acute) 7. Skin Integrity Cognitive Skills Affected (resulting in the inability to perform in a timely and safe manner): 1. Sustained Attention 2. Divided Attention Psychosocial Skills Affected: 1. Habits/Routines 2. Environmental Adaptation 3. Social Interaction Number of elements that affect the Plan of Care: 5+:  HIGH COMPLEXITY CLINICAL DECISION MAKING:  
[facility] AM-PAC 6 Clicks Daily Activity Inpatient Short Form How much help from another person does the patient currently need. .. Total A Lot A Little None 1. Putting on and taking off regular lower body clothing? [] 1   [x] 2   [] 3   [] 4  
2. Bathing (including washing, rinsing, drying)? [] 1   [x] 2   [] 3   [] 4  
3. Toileting, which includes using toilet, bedpan or urinal?   [] 1   [x] 2   [] 3   [] 4  
4. Putting on and taking off regular upper body clothing? [] 1   [] 2   [x] 3   [] 4  
5. Taking care of personal grooming such as brushing teeth? [] 1   [] 2   [x] 3   [] 4  
6. Eating meals?    [] 1   [] 2   [x] 3   [] 4  
 © 2007, Trustees of [facility] [employer], under license to [employer]. All rights reserved Score:  Initial: 15, completed, 2/15/2020 Most Recent: X (Date: -- ) Interpretation of Tool:  Represents activities that are increasingly more difficult (i.e. Bed mobility, Transfers, Gait). Medical Necessity:    
· Skilled intervention continues to be required due to decreased independence, safety, and activity tolerance. In addition, pt having increased dizziness that is limiting pt ability to perform ADLs and functional mobility. Francisco Davis Reason for Services/Other Comments: 
· Patient continues to require skilled intervention due to medical complications and patient unable to attend/participate in therapy as expected. Use of outcome tool(s) and clinical judgement create a POC that gives a: MODERATE COMPLEXITY  
 
 
 
TREATMENT:  
(In addition to Assessment/Re-Assessment sessions the following treatments were rendered) Pre-treatment Symptoms/Complaints: \"This is something new that's been happening since I've been short of breath. \" In regards to pt having dizzy spells. Pain: Initial:  
Pain Intensity 1:0 Post Session:  Unchanged Self Care: (25 minutes): Procedure(s) (per grid) utilized to improve and/or restore self-care/home management as related to toileting and bed mobility, and functional transfers for ADL's. Required minimal verbal, manual and   cueing to facilitate activities of daily living skills. B UE's  Date: 
3/2/2020 Date: 
 Date: Activity/Exercise Parameters Parameters Parameters Shoulder flex/ex 20 reps Shoulder hor abd/add 20 reps Punches  20 reps Elbow flex/ex 20 reps Braces/Orthotics/Lines/Etc:  
· Telemetry Monitor · O2 Device: Nasal cannula Treatment/Session Assessment:   
· Response to Treatment:  Pt very impulsive throughout session. · Interdisciplinary Collaboration:  
o Certified Occupational Therapy Assistant 
o Registered Nurse · After treatment position/precautions:  
o Up in chair 
o Bed/Chair-wheels locked 
o Bed in low position 
o Call light within reach 
o RN notified · Compliance with Program/Exercises: Compliant most of the time, Will assess as treatment progresses. · Recommendations/Intent for next treatment session: \"Next visit will focus on advancements to more challenging activities and reduction in assistance provided\". Total Treatment Duration: OT Patient Time In/Time Out Time In: 0668 Time Out: 1330 Gilberto Mahajan

## 2020-03-04 NOTE — PROGRESS NOTES
Hospitalist Note Admit Date:  2020  3:03 PM  
Name:  Ovidio Welsh Age:  69 y.o. 
:  1945 MRN:  [id number] PCP:  Iirs Rodriguez MD 
Treatment Team: Attending Provider: Leisa Allan MD; Consulting Provider: Andreina Betancourt MD; Utilization Review: Matthias Ledbetter RN; Hospitalist: Susu Torres NP; Hospitalist: Tia Oates NP; Consulting Provider: Deborah Yin MD; Consulting Provider: Tahir Everett MD; Care Manager: James Fuller; Physical Therapist: Camacho Nguyen DPT; Occupational Therapy Assistant: Callie Pickett 
 
HPI/Subjective:  
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  
  
CXR showed interstitial edema/ left pleural effusion. CTA chest no PE. He completed a course of levaquin 20. He was seen by pulmonary s/p bronch 20. He had some hypotension started on midodrine. He had worsening depression, seen by tele psyche for suicidal ideation with sitter that resolved. Discharge plans pending due to recurrent insurance denials 3/4 Patient seen and examined at bedside in no acute distress. Patient reports improvement in diarrhea. Patient complains of nasal congestion. Objective:  
 
Patient Vitals for the past 24 hrs: 
 Temp Pulse Resp BP SpO2  
20 1139 98.1 °F (36.7 °C) 88 18 102/61 93 % 20 0849     91 % 20 0747 98 °F (36.7 °C) 92 18 109/53 92 % 20 0324 97.9 °F (36.6 °C) 95 18 102/55 93 % 20 2325 98 °F (36.7 °C) 82 18 105/50 92 % 20 1935 98.1 °F (36.7 °C) 87 20 135/85 91 % 20 1545 98 °F (36.7 °C) 92 19 95/54 91 % 20 1513  81  119/61   
20 1502  81  106/58   
20 1429  85  114/63   
20 1403  81  117/54  03/03/20 1342  83  99/54   
03/03/20 1323  83  101/50   
03/03/20 1303  82  104/61  Oxygen Therapy O2 Sat (%): 93 % (03/04/20 1139) Pulse via Oximetry: 80 beats per minute (03/04/20 0849) O2 Device: Nasal cannula (03/04/20 0849) O2 Flow Rate (L/min): 3 l/min (03/04/20 0849) FIO2 (%): 21 % (02/23/20 1308) Estimated body mass index is 35.51 kg/m² as calculated from the following: 
  Height as of this encounter: 5' 10\" (1.778 m). Weight as of this encounter: 112.3 kg (247 lb 8 oz). Intake/Output Summary (Last 24 hours) at 3/4/2020 1253 Last data filed at 3/4/2020 7681 Gross per 24 hour Intake 240 ml Output 1503 ml Net -1263 ml  
   
*Note that automatically entered I/Os may not be accurate; dependent on patient compliance with collection and accurate  by techs. General:    Elderly male no acute distress CV:   RRR. No murmur, rub, or gallop. Lungs:   CTAB. No wheezing, rhonchi, or rales. Abdomen:   Soft, nontender, nondistended. Extremities: Warm and dry. No cyanosis or edema. Skin:     No rashes or jaundice. Neuro:  No gross focal deficits Data Review: 
I have reviewed all labs, meds, and studies from the last 24 hours: 
 
Recent Results (from the past 24 hour(s)) GLUCOSE, POC Collection Time: 03/03/20  2:58 PM  
Result Value Ref Range Glucose (POC) 101 (H) 65 - 100 mg/dL GLUCOSE, POC Collection Time: 03/03/20  4:08 PM  
Result Value Ref Range Glucose (POC) 98 65 - 100 mg/dL GLUCOSE, POC Collection Time: 03/03/20  8:14 PM  
Result Value Ref Range Glucose (POC) 219 (H) 65 - 100 mg/dL GLUCOSE, POC Collection Time: 03/04/20  5:06 AM  
Result Value Ref Range Glucose (POC) 75 65 - 100 mg/dL PROTHROMBIN TIME + INR Collection Time: 03/04/20  9:21 AM  
Result Value Ref Range Prothrombin time 29.8 (H) 12.0 - 14.7 sec INR 2.7 METABOLIC PANEL, BASIC Collection Time: 03/04/20  9:21 AM  
Result Value Ref Range Sodium 141 136 - 145 mmol/L Potassium 4.0 3.5 - 5.1 mmol/L Chloride 105 98 - 107 mmol/L  
 CO2 32 21 - 32 mmol/L Anion gap 4 (L) 7 - 16 mmol/L Glucose 77 65 - 100 mg/dL BUN 24 (H) 8 - 23 MG/DL Creatinine 1.94 (H) 0.8 - 1.5 MG/DL  
 GFR est AA 44 (L) >60 ml/min/1.73m2 GFR est non-AA 36 (L) >60 ml/min/1.73m2 Calcium 8.1 (L) 8.3 - 10.4 MG/DL  
CBC W/O DIFF Collection Time: 03/04/20  9:21 AM  
Result Value Ref Range WBC 11.6 (H) 4.3 - 11.1 K/uL  
 RBC 2.97 (L) 4.23 - 5.6 M/uL HGB 8.6 (L) 13.6 - 17.2 g/dL HCT 28.3 (L) 41.1 - 50.3 % MCV 95.3 79.6 - 97.8 FL  
 MCH 29.0 26.1 - 32.9 PG  
 MCHC 30.4 (L) 31.4 - 35.0 g/dL  
 RDW 14.4 11.9 - 14.6 % PLATELET 161 421 - 570 K/uL MPV 9.6 9.4 - 12.3 FL ABSOLUTE NRBC 0.00 0.0 - 0.2 K/uL GLUCOSE, POC Collection Time: 03/04/20 12:30 PM  
Result Value Ref Range Glucose (POC) 174 (H) 65 - 100 mg/dL All Micro Results Procedure Component Value Units Date/Time C. DIFFICILE AG & TOXIN A/B [[id number]] Order Status:  Canceled Specimen:  Stool CULTURE, RESPIRATORY/SPUTUM/BRONCH Arsenio Douse [[id number]] Collected:  02/17/20 1220 Order Status:  Completed Specimen:  Sputum from Bronchial Washing Updated:  02/24/20 1244 Special Requests: NO SPECIAL REQUESTS     
  GRAM STAIN 20 TO 35 WBCS SEEN PER OIF  
   1 TO 2 EPITHELIAL CELLS SEEN PER OIF  
      
  MODERATE GRAM POSITIVE COCCI  
     
   FEW GRAM POSITIVE RODS Culture result:    
  LIGHT NORMAL RESPIRATORY BRENNAN No results found for this visit on 02/13/20. Current Meds: 
Current Facility-Administered Medications Medication Dose Route Frequency  warfarin (COUMADIN) tablet 7.5 mg  7.5 mg Oral ONCE  
 [START ON 3/5/2020] warfarin (COUMADIN) tablet 6 mg  6 mg Oral QPM  
 tuberculin injection 5 Units  5 Units IntraDERMal ONCE  
 insulin lispro (HUMALOG) injection 8 Units  8 Units SubCUTAneous TIDAC  
  ferric gluconate (FERRLECIT) 125 mg in 0.9% sodium chloride 100 mL IVPB  125 mg IntraVENous DAILY  epoetin maritza-epbx (RETACRIT) 14,000 Units combo injection  14,000 Units SubCUTAneous Q7D  
 insulin glargine (LANTUS) injection 45 Units  45 Units SubCUTAneous QHS  insulin lispro (HUMALOG) injection   SubCUTAneous AC&HS  
 albuterol (PROVENTIL VENTOLIN) nebulizer solution 2.5 mg  2.5 mg Nebulization Q6HWA RT  
 polyethylene glycol (MIRALAX) packet 17 g  17 g Oral DAILY  clonazePAM (KlonoPIN) tablet 0.5 mg  0.5 mg Oral QHS PRN  
 traZODone (DESYREL) tablet 100 mg  100 mg Oral QHS  midodrine (PROAMITINE) tablet 10 mg  10 mg Oral TID WITH MEALS  tamsulosin (FLOMAX) capsule 0.4 mg  0.4 mg Oral QHS  busPIRone (BUSPAR) tablet 10 mg  10 mg Oral TID  ALPRAZolam (XANAX) tablet 0.5 mg  0.5 mg Oral QID PRN  
 sodium chloride (NS) flush 5-40 mL  5-40 mL IntraVENous Q8H  
 loperamide (IMODIUM) capsule 4 mg  4 mg Oral QID PRN  
 sodium chloride 3% hypertonic nebulizer soln  4 mL Nebulization BID RT  
 pantothenic ac-min oil-pet,hyd (AQUAPHOR) 41 % ointment   Topical DAILY  traMADol (ULTRAM) tablet 50 mg  50 mg Oral Q6H PRN  
 sodium chloride (NS) flush 5-40 mL  5-40 mL IntraVENous Q8H  
 sodium chloride (NS) flush 5-40 mL  5-40 mL IntraVENous PRN  
 acetaminophen (TYLENOL) tablet 650 mg  650 mg Oral Q4H PRN  
 naloxone (NARCAN) injection 0.4 mg  0.4 mg IntraVENous PRN  
 ondansetron (ZOFRAN) injection 4 mg  4 mg IntraVENous Q4H PRN  
 bisacodyL (DULCOLAX) tablet 5 mg  5 mg Oral DAILY PRN Other Studies (last 24 hours): No results found. Assessment and Plan:  
 
Hospital Problems as of 3/4/2020 Date Reviewed: 2/24/2020 Codes Class Noted - Resolved POA  
 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 
ICD-9-CM: 250.40, 583.81  8/26/2019 - Present Yes Obesity, morbid ([facility] (Chronic) ICD-10-CM: E66.01 
ICD-9-CM: 278.01  10/19/2018 - Present Yes DM type 2 (diabetes mellitus, type 2) (HCC) (Chronic) ICD-10-CM: E11.9 ICD-9-CM: 250.00  1/14/2013 - Present Yes HLD (hyperlipidemia) (Chronic) ICD-10-CM: U47.6 ICD-9-CM: 272.4  1/14/2013 - Present Yes RESOLVED: Acute-on-chronic kidney injury ([facility] ICD-10-CM: N17.9, N18.9 ICD-9-CM: 584.9, 585.9  2/17/2020 - 2/25/2020 Yes RESOLVED: Atelectasis ICD-10-CM: J98.11 ICD-9-CM: 518.0  2/17/2020 - 2/25/2020 Yes RESOLVED: Fluid overload ICD-10-CM: E87.70 ICD-9-CM: 276.69  2/15/2020 - 2/25/2020 Yes RESOLVED: HCAP (healthcare-associated pneumonia) ICD-10-CM: J18.9 ICD-9-CM: 620  2/14/2020 - 2/25/2020 Yes RESOLVED: Pleural effusion ICD-10-CM: J90 ICD-9-CM: 511.9  2/3/2020 - 2/25/2020 Yes * (Principal) RESOLVED: Acute hypoxemic respiratory failure ([facility] ICD-10-CM: J96.01 
ICD-9-CM: 518.81  1/10/2020 - 2/25/2020 Yes RESOLVED: HTN (hypertension) ICD-10-CM: I10 
ICD-9-CM: 401.9  1/14/2013 - 2/25/2020 Yes Plan: 
 
ESRD on HD 
TTS Plan: 
-HD as per nephro Orthostatic hypotension Systolic blood pressure in 70s upon standing Plan: -Orthostatic BP 2 times daily 
-If patient continues to have orthostatic hypotension will initiate fludrocortisone 0.1 mg daily 
-Continue Midrin 10 mg 3 times daily Leukocytosis WBC: 11.6 Trending upwards No signs or symptoms of infection Plan: 
-Repeat chest x-ray 
-Monitor for signs of infection Diabetes mellitus 
-Hold home medications 
-POC before meals and at bedtime 
-Insulin sliding scale 
-Long-acting insulin: 45 units Lantus DVT 
-Continue warfarin CAD Status post CABG and AVR 1/10/2020 Atrial Fibrillation Currently Rate Controlled Anticoagulated with Coumadin Plan: 
-Continue Home Meds Depression No longer suicidal 
Denies suicidal homicidal ideation Plan: 
-Continue BuSpar 10 mg 3 times daily DC planning/Dispo:   Placement pending Diet:  DIET DIABETIC CONSISTENT CARB 
DIET NUTRITIONAL SUPPLEMENTS 
DVT ppx: Coumadin Signed: 
Yvette Hernandez MD

## 2020-03-04 NOTE — PROGRESS NOTES
Pt's 02 order verified as per his request. Also requests xanax with nighttime meds. Afebrile. No c/o pain. In no acute distress. Call bell within reach.

## 2020-03-04 NOTE — PROGRESS NOTES
Resting in bed, hob elevated. Alert,oriented. Oxygen remains on via n/c at 3 lpm. Lung sounds diminished. Abdomen obese, bs present appetite good. hw left rm intact, site healthy. Left permcath intact, site healthy dressings to sacrum, toes intact, toes black in color. Small dressing noted to left thigh intact. . Voids w/o any difficulty, 1+ edema noted in lower extremities. DNR status noted aware to call for asst, rails up x3. Am labs awaiting lab, hard stick

## 2020-03-04 NOTE — PROGRESS NOTES
Problem: Mobility Impaired (Adult and Pediatric) Goal: *Acute Goals and Plan of Care (Insert Text) Description LTG: (Reviewed and updated 3/2/20) (1.)Mr. Lula Polanco will move from supine to sit and sit to supine , scoot up and down and roll side to side INDEPENDENTLY within 7 treatment day(s) from flat surface. (2.)Mr. Lula Polanco will transfer from bed to chair and chair to bed with MODIFIED INDEPENDENCE using the least restrictive device within 7 treatment day(s). (3.)Mr. Lula Polanco will ambulate with STAND BY ASSIST for 150+ feet with the least restrictive device within 7 treatment day(s) while maintaining normal vital signs. (4.)Mr. Lula Polanco will perform exercises per HEP for 10+ minutes to improve strength and mobility within 7 days. ____________________________________________________________________________________________ Outcome: Progressing Towards Goal 
  
PHYSICAL THERAPY: Daily Note and PM 3/4/2020 INPATIENT: PT Visit Days : 1 Payor: Valentina Cardoso / Plan: Carlie Copier / Product Type: [employer] /   
  
NAME/AGE/GENDER: Mayco Leslie is a 69 y.o. male PRIMARY DIAGNOSIS: [facility] [J96.00] Fluid overload [E87.70] Acute hypoxemic respiratory failure (HCC) Acute hypoxemic respiratory failure (HCC) Procedure(s) (LRB): BRONCHOSCOPY (N/A) BRONCHIAL ALVEOLAR LAVAGE (N/A) 16 Days Post-Op ICD-10: Treatment Diagnosis:  
 · Generalized Muscle Weakness (M62.81) · Difficulty in walking, Not elsewhere classified (R26.2) Precaution/Allergies: 
Heparin; Amoxicillin; Keflex [cephalexin]; and Pcn [penicillins] ASSESSMENT:  
 
Mr. Lula Polanco presents sitting up at edge of bed, agreeable to therapy and mobility. Needs assistance with bed mobility, provided cueing for log roll/technique and sequencing during supine to sit. Fair seated balance noted at edge of bed during functional tasks and mobility.  Min assist for sit-stand transfer performed x several reps from edge of bed then from bedside commode. Transfer training including verbal cues for body mechanics, weight shift, and hand/foot placement. Fair to poor standing balance noted needing heavy walker use for safety. Ambulates 4 ft from bed to chair with RW, min A, and cueing for gait safety, technique. We again practiced transfers and pre-gait activities, standing tolerance x a few reps from bedside commode. BP continues to be low and pt feeling more symptomatic (reporting dizziness, feeling foggy, \"everything going white\", and states he feels disoriented), therefore further ambulation deferred at this time. Pt up to chair after session, positioned comfortably with needs in reach. Pt slow to progress, still limited by symptomatic hypotension (see below). Will need continued acute PT to address deficits/maximize independence with mobility. Needs rehab at [place]. Orthostatic BP 
103/52 supine 92/51 sitting (pre activity 82/40 standing 92/43 sitting (post activity) At this time, patient is appropriate for Co-treatment with occupational therapy due to patient's decreased overall endurance/tolerance levels, as well as need for high level skilled assistance to complete functional transfers/mobility and functional tasks. Rosaura Macario is appropriate for a multidisciplinary co-treatment of PT and OT to address goals of both disciplines. This section established at most recent assessment PROBLEM LIST (Impairments causing functional limitations): 1. Decreased Strength 2. Decreased ADL/Functional Activities 3. Decreased Transfer Abilities 4. Decreased Ambulation Ability/Technique 5. Decreased Balance 6. Decreased Activity Tolerance 7. Increased Fatigue 8. Increased Shortness of Breath 9. Edema/Girth INTERVENTIONS PLANNED: (Benefits and precautions of physical therapy have been discussed with the patient.) 1. Balance Exercise 2. Bed Mobility 3. Family Education 4. Gait Training 5. Home Exercise Program (HEP) 6. Neuromuscular Re-education/Strengthening 7. Therapeutic Activites 8. Therapeutic Exercise/Strengthening 9. Transfer Training TREATMENT PLAN: Frequency/Duration: 3 times a week for duration of hospital stay Rehabilitation Potential For Stated Goals: Good REHAB RECOMMENDATIONS (at time of discharge pending progress):   
Placement: It is my opinion, based on this patient's performance to date, that Mr. Sofia Tenorio may benefit from intensive therapy at a [address] [place] [place] [address] after discharge due to the functional deficits listed above that are likely to improve with skilled rehabilitation and concerns that he/she may be unsafe to be unsupervised at home due to a fall risk, safety concerns for transfers and ambulation at home. Equipment: ? To be determined HISTORY:  
History of Present Injury/Illness (Reason for Referral): Mr. 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 Was DC 2/12/20 from CABG and aortic valve replacement complicated by CKD requiring HD, HIT +, DVTs, wound left thigh on wound vac, pneumonia DC on levaquin Q 48 hours last dose 2/11/20. He was DC on 2 L O2 however has been increased to 4L O2 via NC at STR yesterday when he arrived. Today he was at HD and reports dizziness with sitting up and acute onset of SOB. Son at bedside and states pt was doing ok since DC yesterday until today at HD. INR 2.4. CXR showed improving interstitial edema, unchanged left basilar opacity and left pleural effusion. Pt given lasix in ED. Pt is SOB when talking in short sentences. Denies CP, n/v.  Has had diarrhea however is not new since hospitalization. Past Medical History/Comorbidities: Mr. Roxanne Ridley  has a past medical history of Arthritis, BPH (benign prostatic hyperplasia) (1/14/2013), CAD (coronary artery disease), DM type 2 (diabetes mellitus, type 2) ([facility] (dx 2004), Dyspnea, Gout (1/14/2013), HLD (hyperlipidemia) (1/14/2013), HTN (hypertension), Morbid obesity ([facility] (9/3/14), Psychiatric disorder, Rheumatic fever, Seasonal allergic rhinitis, Severe aortic valve stenosis, Thyroid disease, and Unspecified sleep apnea (2016). Mr. Roxanne Ridley  has a past surgical history that includes hx tonsil and adenoidectomy; hx heart catheterization (12/23/2019); hx orthopaedic (Left); hx cataract removal (Bilateral, 2012); and ir insert tunl cvc w port over 5 years (2/4/2020). Social History/Living Environment:  
Home Environment: Private residence # Steps to Enter: 2 Rails to Enter: No 
One/Two Story Residence: One story Living Alone: No 
Support Systems: Spouse/Significant Other/Partner Patient Expects to be Discharged to[de-identified] Rehabilitation facility Current DME Used/Available at Home: Cane, straight, Walker, rolling Tub or Shower Type: Shower Prior Level of Function/Work/Activity: 
Lives with spouse, admit from rehab; has been using RW short distance ambulation, assist ADLs Personal Factors:   
      Sex:  male Age:  69 y.o. Overall Behavior:  agreeable Number of Personal Factors/Comorbidities that affect the Plan of Care: 
CAD, DM, DVTs, age 3+: HIGH COMPLEXITY EXAMINATION:  
Most Recent Physical Functioning:  
Gross Assessment: 
AROM: Within functional limits Strength: Generally decreased, functional 
         
  
Posture: 
Posture (WDL): Exceptions to [facility] Posture Assessment: Forward head, Rounded shoulders Balance: 
Sitting: Impaired Sitting - Static: Good (unsupported) Sitting - Dynamic: Fair (occasional) Standing: Impaired Standing - Static: Fair(-) Standing - Dynamic : Fair(-) Bed Mobility: 
Supine to Sit: Moderate assistance Scooting: Contact guard assistance Wheelchair Mobility: 
  
Transfers: 
Sit to Stand: Contact guard assistance;Minimum assistance Stand to Sit: Contact guard assistance Bed to Chair: Minimum assistance Interventions: Verbal cues; Visual cues; Safety awareness training; Tactile cues;Manual cues Duration: 23 Minutes Gait: 
  
Base of Support: Center of gravity altered; Widened Speed/Federica: Pace decreased (<100 feet/min); Slow Step Length: Left shortened;Right shortened Gait Abnormalities: Trunk sway increased;Decreased step clearance;Shuffling gait Distance (ft): 5 Feet (ft) Assistive Device: Walker, rolling;Gait belt Ambulation - Level of Assistance: Minimal assistance Interventions: Verbal cues; Visual/Demos; Safety awareness training; Tactile cues;Manual cues Body Structures Involved: 1. Heart 2. Lungs 3. Muscles Body Functions Affected: 1. Cardio 2. Respiratory 3. Movement Related Activities and Participation Affected: 1. General Tasks and Demands 2. Mobility 3. Self Care Number of elements that affect the Plan of Care: 4+: HIGH COMPLEXITY CLINICAL PRESENTATION:  
Presentation: Evolving clinical presentation with changing clinical characteristics: MODERATE COMPLEXITY CLINICAL DECISION MAKIN[address] [place], [place] [id number] AM-[facility] 6 Clicks Basic Mobility Inpatient Short Form How much difficulty does the patient currently have. .. Unable A Lot A Little None 1. Turning over in bed (including adjusting bedclothes, sheets and blankets)? [] 1   [] 2   [x] 3   [] 4  
2. Sitting down on and standing up from a chair with arms ( e.g., wheelchair, bedside commode, etc.)   [] 1   [] 2   [x] 3   [] 4  
3. Moving from lying on back to sitting on the side of the bed? [] 1   [] 2   [x] 3   [] 4 How much help from another person does the patient currently need. .. Total A Lot A Little None 4. Moving to and from a bed to a chair (including a wheelchair)?    [] 1   [] 2   [x] 3   [] 4  
 5.  Need to walk in hospital room? [] 1   [] 2   [x] 3   [] 4  
6. Climbing 3-5 steps with a railing? [] 1   [x] 2   [] 3   [] 4  
© 2007, Trustees of [employer], under license to [employer]. All rights reserved Score:  Initial: 17 Most Recent: X (Date: -- ) Interpretation of Tool:  Represents activities that are increasingly more difficult (i.e. Bed mobility, Transfers, Gait). Medical Necessity:    
· Patient is expected to demonstrate progress in  
· strength, range of motion, balance, and coordination ·  to  
· decrease assistance required with overall functional mobility, transfers, ambulation · . · Patient demonstrates · good ·  rehab potential due to higher previous functional level. Reason for Services/Other Comments: 
· Patient · continues to require present interventions due to patient's inability to perform bed mobility, transfers, ambulation safely and effectively · . Use of outcome tool(s) and clinical judgement create a POC that gives a: Clear prediction of patient's progress: LOW COMPLEXITY  
  
 
 
 
TREATMENT:  
(In addition to Assessment/Re-Assessment sessions the following treatments were rendered) Pre-treatment Symptoms/Complaints:  \"everything is going white\" Pain: Initial:  
Pain Intensity 1: 0  Post Session: 0/10 Therapeutic Activity: (  23 Minutes ):  Therapeutic activities including Bed mobility, sit-stand transfers, seated and standing static/dynamic mobility, Chair transfers, Toilet transfers, Ambulation on level ground all while monitoring BP during position cjamge to improve mobility, strength, balance, coordination and activity tolerance. Required minimal Verbal cues; Visual/Demos; Safety awareness training; Tactile cues;Manual cues to promote static and dynamic balance in standing and promote motor control of bilateral, upper extremity(s), lower extremity(s).  
 
 
 
 Date: 
2/26/20 Date: 
 Date: 
  
ACTIVITY/EXERCISE AM PM AM PM AM PM  
 APs 1 x 20 B Heel slides 1 x 20 B Hip ABD/ADD 1 x 15 B SLR 1 x 10 B       
        
        
        
B = bilateral; AA = active assistive; A = active; P = passive Braces/Orthotics/Lines/Etc:  
· Nasal cannula Treatment/Session Assessment:   
· Response to Treatment:  Unchanged; mobility limited by hypotension · Interdisciplinary Collaboration:  
o Physical Therapist 
o Registered Nurse · After treatment position/precautions:  
o Up in chair 
o Bed/Chair-wheels locked 
o Bed in low position 
o Call light within reach 
o RN notified · Compliance with Program/Exercises: Will assess as treatment progresses · Recommendations/Intent for next treatment session: \"Next visit will focus on advancements to more challenging activities\". Total Treatment Duration: PT Patient Time In/Time Out Time In: 9617 Time Out: 1336 Sergio Vega DPT

## 2020-03-04 NOTE — PROGRESS NOTES
Renal Progress Note Admission Date: 2/13/2020 Subjective:  
  
Patient seen and examined on rounds, asking to sit up in chair, SOB better overall this AM. 
Labs and chart reviewed ROS:  
General; no fever/ chills, appetite ok Lung: + SOB 
CV: no CP 
GI: no N/V/D Ext: no edema Objective:  
 
Physical Exam:   
Patient Vitals for the past 8 hrs: 
 BP Temp Pulse Resp SpO2  
03/04/20 0849     91 % 03/04/20 0747 109/53 98 °F (36.7 °C) 92 18 92 % 03/04/20 0324 102/55 97.9 °F (36.6 °C) 95 18 93 % Gen: comfortable , NAD HEENT: moist membranes CV: S1, S2, regular rate, no Rub Lungs: Clear bilaterally, no wheezing GI: soft, non-tender, + BS Extem: trace edema, no cyanosis Access: left TCC intact Current Facility-Administered Medications Medication Dose Route Frequency  warfarin (COUMADIN) tablet 5 mg  5 mg Oral QPM  
 tuberculin injection 5 Units  5 Units IntraDERMal ONCE  
 insulin lispro (HUMALOG) injection 8 Units  8 Units SubCUTAneous TIDAC  ferric gluconate (FERRLECIT) 125 mg in 0.9% sodium chloride 100 mL IVPB  125 mg IntraVENous DAILY  epoetin maritza-epbx (RETACRIT) 14,000 Units combo injection  14,000 Units SubCUTAneous Q7D  
 insulin glargine (LANTUS) injection 45 Units  45 Units SubCUTAneous QHS  insulin lispro (HUMALOG) injection   SubCUTAneous AC&HS  
 albuterol (PROVENTIL VENTOLIN) nebulizer solution 2.5 mg  2.5 mg Nebulization Q6HWA RT  
 polyethylene glycol (MIRALAX) packet 17 g  17 g Oral DAILY  clonazePAM (KlonoPIN) tablet 0.5 mg  0.5 mg Oral QHS PRN  
 traZODone (DESYREL) tablet 100 mg  100 mg Oral QHS  midodrine (PROAMITINE) tablet 10 mg  10 mg Oral TID WITH MEALS  tamsulosin (FLOMAX) capsule 0.4 mg  0.4 mg Oral QHS  busPIRone (BUSPAR) tablet 10 mg  10 mg Oral TID  ALPRAZolam (XANAX) tablet 0.5 mg  0.5 mg Oral QID PRN  
 sodium chloride (NS) flush 5-40 mL  5-40 mL IntraVENous Q8H  
  loperamide (IMODIUM) capsule 4 mg  4 mg Oral QID PRN  
 sodium chloride 3% hypertonic nebulizer soln  4 mL Nebulization BID RT  
 pantothenic ac-min oil-pet,hyd (AQUAPHOR) 41 % ointment   Topical DAILY  traMADol (ULTRAM) tablet 50 mg  50 mg Oral Q6H PRN  
 sodium chloride (NS) flush 5-40 mL  5-40 mL IntraVENous Q8H  
 sodium chloride (NS) flush 5-40 mL  5-40 mL IntraVENous PRN  
 acetaminophen (TYLENOL) tablet 650 mg  650 mg Oral Q4H PRN  
 naloxone (NARCAN) injection 0.4 mg  0.4 mg IntraVENous PRN  
 ondansetron (ZOFRAN) injection 4 mg  4 mg IntraVENous Q4H PRN  
 bisacodyL (DULCOLAX) tablet 5 mg  5 mg Oral DAILY PRN Data Review:  
 
LABS:  
Recent Results (from the past 12 hour(s)) GLUCOSE, POC Collection Time: 03/04/20  5:06 AM  
Result Value Ref Range Glucose (POC) 75 65 - 100 mg/dL METABOLIC PANEL, BASIC Collection Time: 03/04/20  9:21 AM  
Result Value Ref Range Sodium 141 136 - 145 mmol/L Potassium 4.0 3.5 - 5.1 mmol/L Chloride 105 98 - 107 mmol/L  
 CO2 32 21 - 32 mmol/L Anion gap 4 (L) 7 - 16 mmol/L Glucose 77 65 - 100 mg/dL BUN 24 (H) 8 - 23 MG/DL Creatinine 1.94 (H) 0.8 - 1.5 MG/DL  
 GFR est AA 44 (L) >60 ml/min/1.73m2 GFR est non-AA 36 (L) >60 ml/min/1.73m2 Calcium 8.1 (L) 8.3 - 10.4 MG/DL  
CBC W/O DIFF Collection Time: 03/04/20  9:21 AM  
Result Value Ref Range WBC 11.6 (H) 4.3 - 11.1 K/uL  
 RBC 2.97 (L) 4.23 - 5.6 M/uL HGB 8.6 (L) 13.6 - 17.2 g/dL HCT 28.3 (L) 41.1 - 50.3 % MCV 95.3 79.6 - 97.8 FL  
 MCH 29.0 26.1 - 32.9 PG  
 MCHC 30.4 (L) 31.4 - 35.0 g/dL  
 RDW 14.4 11.9 - 14.6 % PLATELET 599 556 - 523 K/uL MPV 9.6 9.4 - 12.3 FL ABSOLUTE NRBC 0.00 0.0 - 0.2 K/uL Plan: Active Problems: 
  DM type 2 (diabetes mellitus, type 2) ([facility] (1/14/2013) HLD (hyperlipidemia) (1/14/2013) Obesity, morbid ([facility] (10/19/2018) Type 2 diabetes with nephropathy ([facility] (8/26/2019) CAD (coronary artery disease) (1/10/2020) S/P CABG x 3 (1/10/2020) S/P AVR (1/10/2020) Atrial fibrillation ([facility] (1/13/2020) HIT (heparin-induced thrombocytopenia) ([facility] (1/23/2020) Hypotension (2/18/2020) Acute renal failure on dialysis ([facility] (2/25/2020) DNR (do not resuscitate) (2/26/2020) DEJUAN/CKD, likely ESRD - no evidence of renal recovery - ([place] TTS schedule) 
-next HD tomorrow for volume and clearance  
  
HIT/SHARON + 
  
ASHD s/p CABG 
  
Orthostatic Hypotension - on midodrine Anemia - on IV Fe replacement and shelton

## 2020-03-04 NOTE — PROGRESS NOTES
Warfarin dosing per pharmacist 
 
Radha Hess is a 69 y.o. male. Height: 5' 10\" (177.8 cm)   Weight 103.5 kg (228 lbs) Indication:  AVR and afib Goal INR:  2.5 - 3.5 Home dose:  2.5 mg daily Risk factors or significant drug interactions: amiodarone (dc'd 2/5), Levofloxacin (2/13- 2/20 ), mirtazapine (2/13-2/24), escitalopram (2/19-2/20), trazodone (started 2/24) Other anticoagulants:  N/A Daily Monitoring Date  INR     Warfarin dose HGB              Notes 2/14  2.4  3 mg  8.7 2/15  2.2  4 mg  9.6 2/16  2.1  5 mg  9.8 2/17  2.1  5 mg  9.5 2/18  2.8  2 mg  9.3 2/19  2.5  2 mg  8.7 
2/20  1.9  3 mg   8.8 
2/21  1.9  4 mg  8.6 
2/22  2.1  3 mg  8.5 
2/23  1.9  4 mg  --- 
2/24  1.7  3 mg + 2 mg --- 
2/25  1.7  5 mg  --- 
2/26  1.6  6 mg  --- 
2/27  1.7  6 mg   --- 
2/28  1.7  7.5 mg  --- 
2/29  2.1 ` 6 mg  7.7 
3/1  2.0  7.5 mg  --- 
3/2  2.9  6 mg  --- 
3/3  3.4  5 mg  8.0 
3/4  2.7  7.5mg  8.6 INR is therapeutic today, has decreased to 2.7. Patient continues to have labile INRs. Will give 7.5 mg today and then resume 6 mg daily tomorrow for now. Further dose adjustments based on INR trend. Pharmacy will continue to follow patient and monitor INR daily. Thank you, Isela Valiente, PharmD, [facility] Clinical [facility] Specialist 
[phone]

## 2020-03-04 NOTE — PROGRESS NOTES
Nutrition follow-up Initial assessment for:  
Best Practice Alert for Pressure Injury stage 2 or greater 
  
Assessment: Admitted with A/chronic hypoxic respiratory failure/pna, DVT, CAD s/p CABG s/p AVR, HTN, DM, ESRD on HD.  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 He continues on HD. Now with diarrhea that is improving and nasal congestion. He is awaiting placement. Patient was seen in follow-up today. He is frustrated, but is pleasant to RD. He states that his appetite has improved and he is eating well. He only complains of difficulty due pain in fingers. He states \"I'm just not gonna eat meat. \" When RD explaining that protein is needed for protein due to increased protein needs for HD and healing, he agrees to chopped meats \"if it's not too much trouble. \" He reports that he is continues drinking Nepro with am meal. 
Skin Status per Wound Care:  
coccyx gluteal cleft wound - improving per wound care, full thickness slough and mild erythema surrounding, demarcating, moderate amount of serosanguinous drainage Left thigh upper wound is one of the vein harvest sites, from CABG - also improving per wound care DIET DIABETIC CONSISTENT CARB Regular; 2 GM NA (House Low NA) DIET Nepro with breakfast   
Per charting intake of meals % of meals. Anthropometrics: 
Height: 5' 10\" (177.8 cm), Weight: 99.8 kg (220 lb 0.3 oz), Weight Source: Bed, Body mass index is 31.57 kg/m². BMI class of obese. His weight has fluctuated per EMR review but is likely associated with start of HD, fluid status an/or poor po intake.  
Macronutrient needs: (using IBW (Ideal body weight) 75.5 kg) KAI: 5520-2234 DHOY/PAQ (25-30 kcal/kg) EPR:  g/day (1.2-1.4 g/kg) 
     
Nutrition Intervention: Meals and snacks: Continue current diet, will add chopped meats. Medical food supplement therapy: Continue Nepro 1 x per day for additional protein  - patient prefers berry Discharge Nutrition Plan: Too soon to determine. [address] [facility] [address] [place] [address], [id number], [address] [facility]

## 2020-03-04 NOTE — PROGRESS NOTES
Problem: Falls - Risk of 
Goal: *Absence of Falls Description Document Adolfo Powers Fall Risk and appropriate interventions in the flowsheet. Outcome: Progressing Towards Goal 
Note: Fall Risk Interventions: 
Mobility Interventions: Bed/chair exit alarm, PT Consult for mobility concerns Mentation Interventions: Adequate sleep, hydration, pain control Medication Interventions: Bed/chair exit alarm Elimination Interventions: Bed/chair exit alarm, Patient to call for help with toileting needs History of Falls Interventions: Bed/chair exit alarm

## 2020-03-05 NOTE — DIALYSIS
TRANSFER IN - DIALYSIS Received patient in dialysis unit  from [facility] (unit) for ordered procedure. Consent verified for renal replacement therapy. Patient caox4 and vital signs stable. /68 P80 Hemodialysis initiated using LTPC. Aspirated and flushed both ports without difficulty. Dressing clean, dry and intact. Machine settings per MD order. Will monitor during treatment.

## 2020-03-05 NOTE — PROGRESS NOTES
Warfarin dosing per pharmacist 
 
Radha Hess is a 69 y.o. male. Height: 5' 10\" (177.8 cm)   Weight 103.5 kg (228 lbs) Indication:  AVR and afib Goal INR:  2.5 - 3.5 Home dose:  2.5 mg daily Risk factors or significant drug interactions: amiodarone (dc'd 2/5), Levofloxacin (2/13- 2/20 ), mirtazapine (2/13-2/24), escitalopram (2/19-2/20), trazodone (started 2/24) Other anticoagulants:  N/A Daily Monitoring Date  INR     Warfarin dose HGB              Notes 2/14  2.4  3 mg  8.7 2/15  2.2  4 mg  9.6 2/16  2.1  5 mg  9.8 2/17  2.1  5 mg  9.5 2/18  2.8  2 mg  9.3 2/19  2.5  2 mg  8.7 
2/20  1.9  3 mg   8.8 
2/21  1.9  4 mg  8.6 
2/22  2.1  3 mg  8.5 
2/23  1.9  4 mg  --- 
2/24  1.7  3 mg + 2 mg --- 
2/25  1.7  5 mg  --- 
2/26  1.6  6 mg  --- 
2/27  1.7  6 mg   --- 
2/28  1.7  7.5 mg  --- 
2/29  2.1 ` 6 mg  7.7 
3/1  2.0  7.5 mg  --- 
3/2  2.9  6 mg  --- 
3/3  3.4  5 mg  8.0 
3/4  2.7  7.5mg  8.6 
3/5  2.9  6 mg  8.0 INR is therapeutic today, has increased to 2.9. Patient continues to have labile INRs. Will resume 6 mg daily for now. Further dose adjustments based on INR trend. Pharmacy will continue to follow patient and monitor INR daily. Thank you, Isela Valiente, PharmD, [facility] Clinical [facility] Special[facility] 
[phone]

## 2020-03-05 NOTE — PROGRESS NOTES
Hospitalist Note Admit Date:  2020  3:03 PM  
Name:  Ruba Richardson Age:  69 y.o. 
:  1945 MRN:  [id number] PCP:  Ean Calderon MD 
Treatment Team: Attending Provider: Oziel Molina MD; Consulting Provider: Alireza Conde MD; Utilization Review: Mamie Eid RN; Hospitalist: Ingrid Stubbs NP; Hospitalist: Yo Sterling NP; Consulting Provider: Deborah Yin MD; Consulting Provider: Pino Clemens MD; Care Manager: Marni Monreal.; Physical Therapy Assistant: Gama Elmore PTA; Utilization Review: Mason Cooper Primary Nurse: Samantha GUNTER 
 
HPI/Subjective:  
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  
  
CXR showed interstitial edema/ left pleural effusion. CTA chest no PE. He completed a course of levaquin 20. He was seen by pulmonary s/p bronch 20. He had some hypotension started on midodrine. He had worsening depression, seen by tele psyche for suicidal ideation with sitter that resolved. Discharge plans pending due to recurrent insurance denials 3/ Patient seen and examined at bedside in no acute distress. Patient with several complaints about hospital stay and nursing staff. Denies any chest pain or shortness of breath at this time. Reports continued improvement in diarrhea. Objective:  
 
Patient Vitals for the past 24 hrs: 
 Temp Pulse Resp BP SpO2  
20 1445 98.5 °F (36.9 °C) 93 18 117/63 90 % 20 1117  84  105/58   
20 1104  80  98/69   
20 1033  80  105/71   
20 1003  74  105/58   
20 0936  79  103/56   
20 0907  76  103/57   
20 0834  77  97/53   
20 0802  82  119/43   
20 0753  80  131/68  03/05/20 0219 98.4 °F (36.9 °C) 78 20 116/52 90 % 03/04/20 2327 97.5 °F (36.4 °C) 85 22 142/57 93 % 03/04/20 2151  84  113/51   
03/04/20 2042 97.9 °F (36.6 °C) 86 18 131/65 93 % 03/04/20 2026     90 % Oxygen Therapy O2 Sat (%): 90 % (03/05/20 1445) Pulse via Oximetry: 86 beats per minute (03/04/20 2026) O2 Device: Nasal cannula (03/04/20 2026) O2 Flow Rate (L/min): 4 l/min (03/04/20 2026) FIO2 (%): 36 % (03/04/20 2026) Estimated body mass index is 36.89 kg/m² as calculated from the following: 
  Height as of this encounter: 5' 10\" (1.778 m). Weight as of this encounter: 116.6 kg (257 lb 1.6 oz). Intake/Output Summary (Last 24 hours) at 3/5/2020 1607 Last data filed at 3/5/2020 1319 Gross per 24 hour Intake 240 ml Output 1975 ml Net -1735 ml *Note that automatically entered I/Os may not be accurate; dependent on patient compliance with collection and accurate  by techs. General:    Elderly male no acute distress CV:   RRR. No murmur, rub, or gallop. Lungs:   CTAB. No wheezing, rhonchi, or rales. Abdomen:   Soft, nontender, nondistended. Extremities: Warm and dry. No cyanosis or edema. Skin:     No rashes or jaundice. Neuro:  No gross focal deficits Data Review: 
I have reviewed all labs, meds, and studies from the last 24 hours: 
 
Recent Results (from the past 24 hour(s)) GLUCOSE, POC Collection Time: 03/04/20  4:21 PM  
Result Value Ref Range Glucose (POC) 233 (H) 65 - 100 mg/dL PLEASE READ & DOCUMENT PPD TEST IN 24 HRS Collection Time: 03/04/20  5:27 PM  
Result Value Ref Range PPD Negative Negative  
 mm Induration 0 0 - 5 mm GLUCOSE, POC Collection Time: 03/04/20  8:38 PM  
Result Value Ref Range Glucose (POC) 263 (H) 65 - 100 mg/dL PROTHROMBIN TIME + INR Collection Time: 03/05/20  6:19 AM  
Result Value Ref Range Prothrombin time 31.0 (H) 12.0 - 14.7 sec INR 2.9 METABOLIC PANEL, BASIC  
 Collection Time: 03/05/20  6:19 AM  
Result Value Ref Range Sodium 139 136 - 145 mmol/L Potassium 4.4 3.5 - 5.1 mmol/L Chloride 104 98 - 107 mmol/L  
 CO2 29 21 - 32 mmol/L Anion gap 6 (L) 7 - 16 mmol/L Glucose 87 65 - 100 mg/dL BUN 29 (H) 8 - 23 MG/DL Creatinine 2.20 (H) 0.8 - 1.5 MG/DL  
 GFR est AA 38 (L) >60 ml/min/1.73m2 GFR est non-AA 31 (L) >60 ml/min/1.73m2 Calcium 8.1 (L) 8.3 - 10.4 MG/DL  
CBC W/O DIFF Collection Time: 03/05/20  6:19 AM  
Result Value Ref Range WBC 8.6 4.3 - 11.1 K/uL  
 RBC 2.76 (L) 4.23 - 5.6 M/uL HGB 8.0 (L) 13.6 - 17.2 g/dL HCT 25.9 (L) 41.1 - 50.3 % MCV 93.8 79.6 - 97.8 FL  
 MCH 29.0 26.1 - 32.9 PG  
 MCHC 30.9 (L) 31.4 - 35.0 g/dL  
 RDW 14.7 (H) 11.9 - 14.6 % PLATELET 425 988 - 469 K/uL MPV 9.3 (L) 9.4 - 12.3 FL ABSOLUTE NRBC 0.00 0.0 - 0.2 K/uL GLUCOSE, POC Collection Time: 03/05/20 10:53 AM  
Result Value Ref Range Glucose (POC) 78 65 - 100 mg/dL GLUCOSE, POC Collection Time: 03/05/20 11:59 AM  
Result Value Ref Range Glucose (POC) 75 65 - 100 mg/dL All Micro Results Procedure Component Value Units Date/Time C. DIFFICILE AG & TOXIN A/B [[id number]] Order Status:  Canceled Specimen:  Stool CULTURE, RESPIRATORY/SPUTUM/BRONCH Lorenzo Vernon [[id number]] Collected:  02/17/20 1220 Order Status:  Completed Specimen:  Sputum from Bronchial Washing Updated:  02/24/20 6696 Special Requests: NO SPECIAL REQUESTS     
  GRAM STAIN 20 TO 35 WBCS SEEN PER OIF  
   1 TO 2 EPITHELIAL CELLS SEEN PER OIF  
      
  MODERATE GRAM POSITIVE COCCI  
     
   FEW GRAM POSITIVE RODS Culture result:    
  LIGHT NORMAL RESPIRATORY BRENNAN No results found for this visit on 02/13/20. Current Meds: 
Current Facility-Administered Medications Medication Dose Route Frequency  warfarin (COUMADIN) tablet 6 mg  6 mg Oral QPM  
  sodium chloride (OCEAN) 0.65 % nasal squeeze bottle 2 Spray  2 Spray Both Nostrils BID  insulin lispro (HUMALOG) injection 8 Units  8 Units SubCUTAneous TIDAC  ferric gluconate (FERRLECIT) 125 mg in 0.9% sodium chloride 100 mL IVPB  125 mg IntraVENous DAILY  epoetin maritza-epbx (RETACRIT) 14,000 Units combo injection  14,000 Units SubCUTAneous Q7D  
 insulin glargine (LANTUS) injection 45 Units  45 Units SubCUTAneous QHS  insulin lispro (HUMALOG) injection   SubCUTAneous AC&HS  
 albuterol (PROVENTIL VENTOLIN) nebulizer solution 2.5 mg  2.5 mg Nebulization Q6HWA RT  
 polyethylene glycol (MIRALAX) packet 17 g  17 g Oral DAILY  clonazePAM (KlonoPIN) tablet 0.5 mg  0.5 mg Oral QHS PRN  
 traZODone (DESYREL) tablet 100 mg  100 mg Oral QHS  midodrine (PROAMITINE) tablet 10 mg  10 mg Oral TID WITH MEALS  tamsulosin (FLOMAX) capsule 0.4 mg  0.4 mg Oral QHS  busPIRone (BUSPAR) tablet 10 mg  10 mg Oral TID  ALPRAZolam (XANAX) tablet 0.5 mg  0.5 mg Oral QID PRN  
 sodium chloride (NS) flush 5-40 mL  5-40 mL IntraVENous Q8H  
 loperamide (IMODIUM) capsule 4 mg  4 mg Oral QID PRN  
 sodium chloride 3% hypertonic nebulizer soln  4 mL Nebulization BID RT  
 pantothenic ac-min oil-pet,hyd (AQUAPHOR) 41 % ointment   Topical DAILY  traMADol (ULTRAM) tablet 50 mg  50 mg Oral Q6H PRN  
 sodium chloride (NS) flush 5-40 mL  5-40 mL IntraVENous Q8H  
 sodium chloride (NS) flush 5-40 mL  5-40 mL IntraVENous PRN  
 acetaminophen (TYLENOL) tablet 650 mg  650 mg Oral Q4H PRN  
 naloxone (NARCAN) injection 0.4 mg  0.4 mg IntraVENous PRN  
 ondansetron (ZOFRAN) injection 4 mg  4 mg IntraVENous Q4H PRN  
 bisacodyL (DULCOLAX) tablet 5 mg  5 mg Oral DAILY PRN Other Studies (last 24 hours): No results found. Assessment and Plan:  
 
Hospital Problems as of 3/5/2020 Date Reviewed: 2/24/2020        Codes Class Noted - Resolved POA  
 DNR (do not resuscitate) (Chronic) ICD-10-CM: V76 
 ICD-9-CM: V49.86  2/26/2020 - Present Yes Acute renal failure on dialysis [facility]) ICD-10-CM: N17.9, Z99.2 ICD-9-CM: 584.9, V45.11  2/25/2020 - Present Yes Hypotension (Chronic) ICD-10-CM: I95.9 ICD-9-CM: 458.9  2/18/2020 - Present Yes HIT (heparin-induced thrombocytopenia) (HCC) (Chronic) ICD-10-CM: T60.01 ICD-9-CM: 289.84  1/23/2020 - Present Yes Atrial fibrillation (HCC) (Chronic) ICD-10-CM: I48.91 
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 
ICD-9-CM: 250.40, 583.81  8/26/2019 - Present Yes Obesity, morbid ([facility] (Chronic) ICD-10-CM: E66.01 
ICD-9-CM: 278.01  10/19/2018 - Present Yes DM type 2 (diabetes mellitus, type 2) (HCC) (Chronic) ICD-10-CM: E11.9 ICD-9-CM: 250.00  1/14/2013 - Present Yes HLD (hyperlipidemia) (Chronic) ICD-10-CM: M44.1 ICD-9-CM: 272.4  1/14/2013 - Present Yes RESOLVED: Acute-on-chronic kidney injury ([facility] ICD-10-CM: N17.9, N18.9 ICD-9-CM: 584.9, 585.9  2/17/2020 - 2/25/2020 Yes RESOLVED: Atelectasis ICD-10-CM: J98.11 ICD-9-CM: 518.0  2/17/2020 - 2/25/2020 Yes RESOLVED: Fluid overload ICD-10-CM: E87.70 ICD-9-CM: 276.69  2/15/2020 - 2/25/2020 Yes RESOLVED: HCAP (healthcare-associated pneumonia) ICD-10-CM: J18.9 ICD-9-CM: 256  2/14/2020 - 2/25/2020 Yes RESOLVED: Pleural effusion ICD-10-CM: J90 ICD-9-CM: 511.9  2/3/2020 - 2/25/2020 Yes * (Principal) RESOLVED: Acute hypoxemic respiratory failure ([facility] ICD-10-CM: J96.01 
ICD-9-CM: 518.81  1/10/2020 - 2/25/2020 Yes  RESOLVED: HTN (hypertension) ICD-10-CM: I10 
 ICD-9-CM: 401.9  1/14/2013 - 2/25/2020 Yes Plan: 
 
ESRD on HD 
TTS Plan: 
-HD as per nephro Orthostatic hypotension Systolic blood pressure in 70s upon standing Plan: 
-Orthostatic BP 2 times daily 
-If patient continues to have orthostatic hypotension will initiate fludrocortisone 0.1 mg daily 
-Continue Midrin 10 mg 3 times daily Leukocytosis----resolved WBC: 8.6 No signs or symptoms of infection Plan: 
-Monitor for signs of infection Diabetes mellitus 
-Hold home medications 
-POC before meals and at bedtime 
-Insulin sliding scale 
-Long-acting insulin: 45 units Lantus DVT 
-Continue warfarin CAD Status post CABG and AVR 1/10/2020 Atrial Fibrillation Currently Rate Controlled Anticoagulated with Coumadin Plan: 
-Continue Home Meds Depression No longer suicidal 
Denies suicidal homicidal ideation Plan: 
-Continue BuSpar 10 mg 3 times daily DC planning/Dispo:   Placement pending Diet:  DIET DIABETIC CONSISTENT CARB 
DIET NUTRITIONAL SUPPLEMENTS 
DVT ppx: Coumadin Signed: 
Bing Callejas MD

## 2020-03-05 NOTE — ADT AUTH CERT NOTES
2/17/20 by Paulino Yoder RN  
 
   
Review Status Review Entered In Primary 3/4/2020 15:07  
   
Criteria Review Pre-procedure Diagnoses Atelectasis [J98.11] Post-procedure Diagnoses Atelectasis [J98.11] Procedures AK BRONCHOSCOPY W/THER ASPIR TRACHBRNCL TREE 1ST [id number] (CPT®)] []? Hide copied text 
  
[]? Hover for details PROCEDURE 
  
Bronchoscopy with airway inspection /cleanout. 
  
INDICATION  
atelectasis 
  
EQUIPMENT: 
Olympus T 180 Bronchoscope 
  
ANESTHESIA 
  
Concious sedation with: Fentanyl 150 mcg IV; Versed 3mg IV; Lidocaine 200 mg to tracheo-bronchial tree and vocal cords 
  IMAGING: 
CXR 2/17/20 
  
AIRWAY INSPECTION 
  
After obtaining informed consent, orally with use of a bite block, an Olympus T 180 Bronchoscope was introduced into the trachea without complication. 
  
                                                            RIGHT 
  
LOCATION NORM/ABNORM DESCRIPTION Larynx NL    
VOCAL CORDS NL    
TRACHEA NL    
DENNIS NL    
RMSB NL    
RUL NL    
BI NL    
RML NL    
RLL NL    
SUP SEGM RLL NL    
MED BASAL NL    
ANTERIOR BASAL NL    
LATERAL BASAL NL    
POSTERIOR BASAL NL    
  
  
                                                            LEFT 
  
LOCATION NORM/ABNORM DESCRIPTION  
LMSB NL    
RADHA NL    
LINGULA NL    
LLL NL    
SUPERIOR SEG LLL NL    
NIRAV-MEDIAL LLL NL    
LATERAL LLL NL    
POSTERIOR LLL NL    
  
All airways were somewhat collapsible but there was nothing obstructing the LLL airways and no mucus plugging.  
  
The following samples were obtained: 
  
Bronchial Wash: LLL 
  
Samples were sent for: 
Cell count, diff, routine culture.  
  
The procedure was completed without complication and the patient tolerated the procedure well. 
  
EBL: None 
  
Recommendations: 
Return to inpatient floors.   
  
  
69 y.o.   male seen and evaluated at the request of Dr. Shala Gordon- Lety for the evaluation of respiratory failure. He was just discharged on 2/12 to [facility] and came right back the next day. .Was DC 2/12/20 from CABG and aortic valve replacement complicated by CKD requiring HD, HIT +, DVTs, wound left thigh on wound vac, pneumonia DC on levaquin Q 48 hours last dose 2/11/20. [facility] was DC on 2 L O2 however has been increased to 4L O2 via NC at STR yesterday when he arrived. he says he reports that during HD he felt dizzy  And had acute onset of SOB. He reports today he get SOB with exertion. HE is now on 4L NC with reported Spo2 97 %. He had chest CT with contrast- no PE, still LLL  infiltate with small effusion 
  
Subjective:  
  
Patient remains on 2-3L O2 at least at rest. No SOB at rest. Minimal cough. CXR today with ongoing LLL atelectasis. Has been made NPO for bronch with airway inspection and cleanout today.  
  
           
Current Facility-Administered Medications Medication Dose Route Frequency  [START ON 2/18/2020] levoFLOXacin (LEVAQUIN) tablet 500 mg  500 mg Oral Q48H  warfarin (COUMADIN) tablet 5 mg  5 mg Oral QPM  
 loperamide (IMODIUM) capsule 4 mg  4 mg Oral QID PRN  
 albuterol (PROVENTIL VENTOLIN) nebulizer solution 2.5 mg  2.5 mg Nebulization Q6H RT  
 sodium chloride 3% hypertonic nebulizer soln  4 mL Nebulization BID RT  
 pantothenic ac-min oil-pet,hyd (AQUAPHOR) 41 % ointment   Topical DAILY  busPIRone (BUSPAR) tablet 10 mg  10 mg Oral TID  insulin glargine (LANTUS) injection 18 Units  18 Units SubCUTAneous QHS  insulin glargine (LANTUS) injection 28 Units  28 Units SubCUTAneous DAILY  mirtazapine (REMERON) tablet 15 mg  15 mg Oral QHS  traMADol (ULTRAM) tablet 50 mg  50 mg Oral Q6H PRN  
 sodium chloride (NS) flush 5-40 mL  5-40 mL IntraVENous Q8H  
 sodium chloride (NS) flush 5-40 mL  5-40 mL IntraVENous PRN  
 acetaminophen (TYLENOL) tablet 650 mg  650 mg Oral Q4H PRN  
  naloxone (NARCAN) injection 0.4 mg  0.4 mg IntraVENous PRN  
 ondansetron (ZOFRAN) injection 4 mg  4 mg IntraVENous Q4H PRN  
 bisacodyL (DULCOLAX) tablet 5 mg  5 mg Oral DAILY PRN  
 insulin regular (NOVOLIN R, HUMULIN R) injection   SubCUTAneous AC&HS  
  
  
Review of Systems Constitutional: negative for fever, chills, sweats Cardiovascular: negative for chest pain, palpitations, syncope, edema Gastrointestinal: negative for dysphagia, reflux, vomiting, diarrhea, abdominal pain, or melena Neurologic: negative for focal weakness, numbness, headache 
  
Objective:  
  
       
            
Vitals:  
  02/17/20 0305 02/17/20 0508 02/17/20 0758 02/17/20 0900 BP:   111/61   114/57 Pulse:   94   82 Resp:   22   20 Temp:   97.9 °F (36.6 °C)   97.6 °F (36.4 °C) SpO2: 97% 95% 96% 98% Weight:   251 lb 3.2 oz (113.9 kg)       
Height:           
  
Intake and Output:  
02/15 1901 - 02/17 0700 In: -  
Out: 1100 [Urine:1100] No intake/output data recorded. 
  
Physical Exam:  
Constitution:  the patient is well developed and in no acute distress EENMT:  Sclera clear, pupils equal, oral mucosa moist 
Respiratory: CTA B in anterior lung fields.  
Cardiovascular:  RRR without M,G,R 
Gastrointestinal: soft and non-tender; with positive bowel sounds. Musculoskeletal: warm without cyanosis. There is L lower leg edema. Skin:  no jaundice or rashes, no wounds Neurologic: no gross neuro deficits    
Psychiatric:  alert and oriented x ppt 
  
CHEST XRAY:  
2/17/20 IMPRESSION: Unchanged left lung base atelectasis or infiltrate and small pleural 
Effusion. 
 
  
LAB No lab exists for component: GLPOC  
         
         
Recent Labs  
  02/17/20 
0502 02/16/20 
0435 02/15/20 
7629 WBC 10.3 9.5 9.1 HGB 9.5* 9.8* 9.6* HCT 30.2* 31.3* 31.5*  131* 146* INR 2.1 2.1 2.2   
  
         
         
Recent Labs  
  02/17/20 
0502 02/16/20 
0554 02/15/20 
1368 * 137 137 K 4.2 4.0 3.9  102 100 CO2 26 27 29 * 176* 150* BUN 44* 26* 30* CREA 4.34* 3.59* 3.67* CA 7.8* 7.6* 7.8*   
  
ABG:  No results found for: PH, PHI, PCO2, PCO2I, PO2, PO2I, HCO3, HCO3I, FIO2, FIO2I 
  
  
Assessment:  (Medical Decision Making)  
  
                       
                     
Hospital Problems  Date Reviewed: 2/17/2020   
        Codes Class Noted POA  
  Acute-on-chronic kidney injury ([facility] ICD-10-CM: N17.9, N18.9 ICD-9-CM: 584.9, 585.9   2/17/2020 Yes   
      
  Atelectasis ICD-10-CM: J98.11 ICD-9-CM: 518.0   2/17/2020 Unknown   
      
  Fluid overload ICD-10-CM: E87.70 ICD-9-CM: 276.69   2/15/2020 Yes   
      
  HCAP (healthcare-associated pneumonia) ICD-10-CM: J18.9 ICD-9-CM: 861   2/14/2020 Yes   
      
  Acute respiratory failure (HCC) ICD-10-CM: J96.00 
ICD-9-CM: 518.81   2/13/2020 Yes   
      
  Atrial fibrillation (HCC) ICD-10-CM: I48.91 
ICD-9-CM: 427.31   1/13/2020 Yes   
      
  CAD (coronary artery disease) ICD-10-CM: I25.10 ICD-9-CM: 414.00   1/10/2020 Yes   
      
  * (Principal) Acute hypoxemic respiratory failure (HCC) ICD-10-CM: J96.01 
ICD-9-CM: 518.81   1/10/2020 Yes   
      
  S/P CABG x 3 ICD-10-CM: Z95.1 ICD-9-CM: V45.81   1/10/2020 Yes   
      
  S/P AVR ICD-10-CM: Z95.2 ICD-9-CM: V43.3   1/10/2020 Yes   
      
  DM type 2 (diabetes mellitus, type 2) ([facility] ICD-10-CM: E11.9 ICD-9-CM: 250.00   1/14/2013 Yes   
      
  HTN (hypertension) ICD-10-CM: I10 
ICD-9-CM: 401. 9   1/14/2013 Yes   
      
  HLD (hyperlipidemia) ICD-10-CM: Y93.3 ICD-9-CM: 272.4   1/14/2013 Yes   
      
     
  
  
Patient with no signs of PNA at present. WBC was normal on this admission. Pct was not repeated. CXR with stable atelectasis.   
  
Plan:  (Medical Decision Making)  
-will proceed with bronch to evaluate LLL airways to see if any treatable causes of his atelectasis. -will plan on obtaining sample for culture. -will repeat pct in the am to guide abx therapy. -cont to wean o2 based on sats. -PT 
  
  
Nando Lr MD 
   
  
   
   
Respiratory Failure [address] Day 16 (2/28/2020) by Martha Randall RN  
 
   
Review Status Review Entered Completed 3/3/2020 14:54  
   
Criteria Review Care Day: 16 Care Date: 2/28/2020 Level of Care: Telemetry Guideline Day 3 Level Of Care ( ) * Activity level acceptable   
(X) Floor to discharge 3/3/2020 14:54:24 EST by Eric Cough   
  Telemetry Clinical Status ( ) * Ventilation status appropriate ( ) * Airway status acceptable   
(X) * Respiratory status acceptable 3/3/2020 14:54:24 EST by Eric Cough   
  Lungs:             CTAB.  No wheezing, rhonchi, or rales. anterior ( ) * Stable chest findings ( ) * Neurologic status acceptable ( ) * Temperature status acceptable ( ) * No infection, or status acceptable ( ) * General Discharge Criteria met Interventions ( ) * No chest tube, or status acceptable ( ) * Intake acceptable ( ) * No inpatient interventions needed * Milestone Additional Notes  
   
Mr. 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 CXR showed interstitial edema/ left pleural effusion. CTA chest no PE. He completed a course of levaquin 2-20-20. He was seen by pulmonary s/p bronch 2-17-20. He had some hypotension started on midodrine. He had worsening depression, seen by tele psyche for suicidal ideation with sitter that resolved. Discharge plans pending due to recurrent insurance denials.   
   
   
2-28-20 feels better, not suicidal, not short of breath, eating better, moving around well,   
   
Objective:  
   
Patient Vitals for the past 24 hrs:  
  Temp Pulse Resp BP SpO2 02/28/20 0853 - - - - 93 % 02/28/20 0803 98.1 °F (36.7 °C) 81 17 113/54 96 % 02/28/20 0102 98.1 °F (36.7 °C) 80 22 97/59 95 % Oxygen Therapy O2 Sat (%): 93 % (02/28/20 0853) Pulse via Oximetry: 86 beats per minute (02/28/20 0853) O2 Device: Nasal cannula (02/28/20 0853) O2 Flow Rate (L/min): 3 l/min (02/28/20 0853) FIO2 (%): 21 % (02/23/20 1308)  
 General:          Well nourished.  Alert. No distress CV:                  RRR, III/VI VERONICA LUSB,. No edema Lungs:             CTAB.  No wheezing, rhonchi, or rales. anterior Abdomen:        Soft, nontender, nondistended. Decreased BS Extremities:     Warm and dry Skin:                No rashes or jaundice. Neuro:             No gross focal deficits Medications,  
albuterol (PROVENTIL VENTOLIN) nebulizer solution 2.5 mg   
Dose: 2.5 mg  
Freq: EVERY 6 HOURS WHILE AWAKE RESP Route: [facility] Start: 02/26/20   
  
insulin glargine (LANTUS) injection 45 Units Dose: 45 Units Freq: EVERY BEDTIME Route: SC  
Start: 02/27/20 2200  
  
insulin lispro (HUMALOG) injection Freq: 4 TIMES DAILY BEFORE MEALS & NIGHTLY Route: SC  
Start: 02/25/20 1130  
 Admin Instructions:  
INITIATE INSULIN CORRECTIVE PROTOCOL:  
  
midodrine (PROAMITINE) tablet 10 mg   
Dose: 10 mg  
Freq: 3 TIMES DAILY WITH MEALS Route: PO  
  
insulin lispro (HUMALOG) injection 15 Units Dose: 15 Units Freq: 3 TIMES DAILY BEFORE MEALS Route: SC  
Start: 02/27/20 1630 End: 03/02/20 0745 Plan:  
   
· Acute on chronic respiratory failure with hypoxia:  
· Weaning O2 as tolerant · Assess home O2 needs prior to discharge   
   
   
· DEJUAN on dialysis: · Defer to nephrology   
   
· Orthostatic hypotension · Continued midodrine  
   
· DM2:   
· Continued lantus, pre prandial and SSI   
   
· DVT/HIT:  
· Continued coumadin with daily INR  
   
· CAD s/p CABG and AVR 1-10-20, AFIB:  
· On coumadin, daily INR  
   
· Depression/anxiety: · Stop sitter · Continued buspar, tradozone  
   
   
DC planning/Dispo:  Pending SNF vs Home  
   
Respiratory Failure GRG - Care Day 13 (2/25/2020) by Danii Godwin RN  
 
   
Review Status Review Entered Completed 2/28/2020 14:41  
   
Criteria Review Care Day: 13 Care Date: 2/25/2020 Level of Care: Telemetry Guideline Day 3 Level Of Care ( ) * Activity level acceptable   
(X) Floor to discharge 2/28/2020 14:41:43 EST by Dorothy New   
  telemetry Clinical Status ( ) * Ventilation status appropriate ( ) * Airway status acceptable   
(X) * Respiratory status acceptable 2/28/2020 14:41:43 EST by Dorothy New   
  Lungs:             CTAB.  No wheezing, rhonchi, or rales. ( ) * Stable chest findings ( ) * Neurologic status acceptable ( ) * Temperature status acceptable ( ) * No infection, or status acceptable ( ) * General Discharge Criteria met Interventions ( ) * No chest tube, or status acceptable ( ) * Intake acceptable ( ) * No inpatient interventions needed * Milestone Additional Notes Mr. 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 He  Had been [address] one day before on  2/12/20 after a prolonged admission due to [address] [place] and aortic valve replacement complicated with DEJUAN requiring HD, HIT +, DVTs, wound left thig, pneumonia and necrosis of several toes possible related to the use of  Levophed/ HIT.   On   2/13 he was at HD and reported dizziness with sitting up and acute onset of SOB. He was sent back to the [facility] SYS showed improving interstitial edema, unchanged left basilar opacity and left pleural effusion. Pt given lasix in ED. Chest CT showed unchanged in general, no PE.  He had been discharged on levaquin and this antibiotic was  continued with EOT 2/20/20. Pt had a sacral wound present on admission.  Pulmonary consulted.  Had bronch 2/17 with findings of no obstruction, small effusion.  Pt has been hypotensive, complicating HD. Started on midodrine for hypotension.  At the present time we are  titrating his dose of Midodrine and insulin.  Complicated discharge as his insurance has denied him more rehab days.   
   
2/25 - pt seen on HD.  No complaints.  feels OK.  Is ok with going home if insurance doesn't approve resubmission of rehab need.  No fevers, CP, SOB  
   Temp Pulse Resp BP SpO2  
02/25/20 0959 - 78 - 107/55 -  
02/25/20 0933 - 81 - 98/53 -  
02/25/20 0903 - 86 - 100/52 -  
02/25/20 0835 - 80 - 105/55 -  
02/25/20 0802 - 80 - (!) 90/39 -  
02/25/20 0744 - 82 - 103/48 -  
02/25/20 0516 97.8 °F (36.6 °C) 88 20 93/45 99 % 02/25/20 0057 97.5 °F (36.4 °C) 90 20 158/50 94 Oxygen Therapy O2 Sat (%): 99 % (02/25/20 0516) Pulse via Oximetry: 91 beats per minute (02/24/20 2358) O2 Device: Room air (02/25/20 0040) O2 Flow Rate (L/min): 3 l/min (02/24/20 2358) FIO2 (%): 21 % (02/23/20 1308)  
   
Estimated body mass index is 35.37 kg/m² as calculated from the following:  
  Height as of this encounter: 5' 10\" (1.778 m).  
  Weight as of this encounter: 111.8 kg (246 lb 8 oz).    
   
Intake/Output Summary (Last 24 hours) at 2/25/2020 1002 Last data filed at 2/25/2020 5986 Gross per 24 hour Intake 360 ml Output 825 ml Net -465 ml  
    
  
   
General:          Well nourished.  Alert.    
Lungs:             CTAB.  No wheezing, rhonchi, or rales. Extremities:     Warm and dry.  No cyanosis or edema. Skin:                No rashes or jaundice. Neuro:             No gross focal deficits  
   
Medications,  
insulin lispro (HUMALOG) injection Freq: 4 TIMES DAILY BEFORE MEALS & NIGHTLY Route: SC  
Start: 02/25/20 1130  
 Admin Instructions:  
INITIATE INSULIN CORRECTIVE PROTOCOL:  
  
 midodrine (PROAMITINE) tablet 10 mg   
Dose: 10 mg  
Freq: 3 TIMES DAILY WITH MEALS Route: PO  
Start: 02/25/20 0800  
  
sodium chloride 3% hypertonic nebulizer soln Dose: 4 mL Freq: 2 TIMES DAILY RESP Route: NEBULIZATION Start: 02/14/20 1100  
  
tamsulosin (FLOMAX) capsule 0.4 mg   
Dose: 0.4 mg  
Freq: EVERY BEDTIME Route: PO  
Start: 02/23/20 2200  
  
albuterol (PROVENTIL VENTOLIN) nebulizer solution 2.5 mg   
Dose: 2.5 mg  
Freq: EVERY 6 HOURS RESP Route: Danii Constable Start: 02/15/20 1400 End: 02/25/20 2155  
  
insulin glargine (LANTUS) injection 30 Units Dose: 30 Units Freq: EVERY BEDTIME Route: SC  
Start: 02/25/20 2200 End: 02/26/20 1100  
  
insulin lispro (HUMALOG) injection 10 Units Dose: 10 Units Freq: 3 TIMES DAILY BEFORE MEALS Route: SC  
Start: 02/25/20 1130 End: 02/27/20 1244  
   
Respiratory Failure GRG - Care Day 10 (2/22/2020) by Lonnie Rodgers RN  
 
   
Review Status Review Entered Completed 2/24/2020 14:32  
   
Criteria Review Care Day: 10 Care Date: 2/22/2020 Level of Care: Telemetry Guideline Day 3 Level Of Care ( ) * Activity level acceptable   
(X) Floor to discharge 2/24/2020 14:32:31 EST by Dang Nolan   
  Floor Clinical Status ( ) * Ventilation status appropriate ( ) * Airway status acceptable   
(X) * Respiratory status acceptable ( ) * Stable chest findings ( ) * Neurologic status acceptable ( ) * Temperature status acceptable ( ) * No infection, or status acceptable ( ) * General Discharge Criteria met Interventions ( ) * No chest tube, or status acceptable ( ) * Intake acceptable ( ) * No inpatient interventions needed * Milestone Additional Notes Patient was seen at bedside. Very deconditioned. Had several complications after a CABG in January 2020. On HD due to post surgical DEJUAN. Blood sugar is better controlled today. Visit Vitals /55 Pulse 91 Temp 97.2 °F (36.2 °C) Resp 18 Ht 5' 10\" (1.778 m) Wt 112.3 kg (247 lb 9.6 oz) SpO2 93% BMI 35.53 kg/m²  
 O2 Flow Rate (L/min): 2 l/min O2 Device: Nasal cannula  
   
Temp (24hrs), Av.5 °F (36.4 °C), Min:97 °F (36.1 °C), Max:97.9 °F (36.6 °C)  
   
701 - 1900 In: 357 [P.O.:357] Out:  [Urine:500] 1901 -  07 In: -   
Out: 575 [Urine:575]  
  General appearance: Oriented and alert, cooperative, obese.      
Head: Normocephalic, without obvious abnormality, atraumatic Eyes: conjunctivae/corneas clear. PERRL Throat: Lips, mucosa, and tongue normal. Teeth and gums normal  
Neck: supple, symmetrical, trachea midline, and no JVD Lungs: Scattered crackles, otherwise clear to auscultation bilaterally Heart: regular rate and rhythm, S1, S2 normal, no murmur, click, rub or gallop Abdomen: soft, non-tender. Bowel sounds normal. No masses,  no organomegaly Extremities: extremities normal, atraumatic, no cyanosis or edema Skin: Skin color, texture, turgor normal. No rashes or lesions Neurologic: Grossly normal  
   
Additional comments: Notes,orders, test results, vitals reviewed  
   
Data Review  
  Ref. Range 2020 04:15 WBC Latest Ref Range: 4.3 - 11.1 K/uL 8.6 RBC Latest Ref Range: 4.23 - 5.6 M/uL 2.93 (L) HGB Latest Ref Range: 13.6 - 17.2 g/dL 8.6 (L) HCT Latest Ref Range: 41.1 - 50.3 % 27.4 (L) MCV Latest Ref Range: 79.6 - 97.8 FL 93.5 MCH Latest Ref Range: 26.1 - 32.9 PG 29.4 MCHC Latest Ref Range: 31.4 - 35.0 g/dL 31.4 RDW Latest Ref Range: 11.9 - 14.6 % 13.7 PLATELET Latest Ref Range: 150 - 450 K/uL 204 MPV Latest Ref Range: 9.4 - 12.3 FL 10.1 ABSOLUTE NRBC Latest Ref Range: 0.0 - 0.2 K/uL 0.00 INR Latest Units:   1.9 Prothrombin time Latest Ref Range: 12.0 - 14.7 sec 22.6 (H) Sodium Latest Ref Range: 136 - 145 mmol/L 139 Potassium Latest Ref Range: 3.5 - 5.1 mmol/L 4.1 Chloride Latest Ref Range: 98 - 107 mmol/L 102 CO2 Latest Ref Range: 21 - 32 mmol/L 28 Anion gap Latest Ref Range: 7 - 16 mmol/L 9 Glucose Latest Ref Range: 65 - 100 mg/dL 185 (H) BUN Latest Ref Range: 8 - 23 MG/DL 42 (H) Creatinine Latest Ref Range: 0.8 - 1.5 MG/DL 3.53 (H) Calcium Latest Ref Range: 8.3 - 10.4 MG/DL 7.7 (L) GFR est non-AA Latest Ref Range: >60 ml/min/1.73m2 18 (L) GFR est AA Latest Ref Range: >60 ml/min/1.73m2 22 (L) Medications,  
albuterol (PROVENTIL VENTOLIN) nebulizer solution 2.5 mg   
Dose: 2.5 mg  
Freq: EVERY 6 HOURS RESP Route: NEBULIZATION  
  
insulin lispro (HUMALOG) injection Freq: 4 TIMES DAILY BEFORE MEALS & NIGHTLY Route: SC  
Start: 02/22/20 0730  
 Admin Instructions:  
INITIATE INSULIN CORRECTIVE PROTOCOL:  
  
midodrine (PROAMITINE) tablet 5 mg Dose: 5 mg Freq: 3 TIMES DAILY WITH MEALS Route: PO  
Start: 02/19/20 1100  
  
sodium chloride 3% hypertonic nebulizer soln Dose: 4 mL Freq: 2 TIMES DAILY RESP Route: NEBULIZATION Start: 02/14/20 1100  
  
insulin glargine (LANTUS) injection 18 Units Dose: 18 Units Freq: EVERY BEDTIME Route: SC  
Start: 02/13/20 2200 End: 02/23/20 1422  
  
insulin lispro (HUMALOG) injection 4 Units Dose: 4 Units Freq: 3 TIMES DAILY BEFORE MEALS Route: SC  
Start: 02/22/20 0730 End: 02/23/20 1422  
  
warfarin (COUMADIN) tablet 3 mg Dose: 3 mg Freq: EVERY EVENING Route: PO  
Start: 02/22/20 1800 End: 02/23/20 1122 Assessment/Plan:  
Acute on chronic hypoxemic respiratory failure, HCAP  
            Finished levaquin  
            Weaning oxygen  
            Flutter valve             2/17:  Bronch with no obstruction             Left Pleural effusion too small to tap   
   
DVT:  
            Continue coumadin with target INR 2.5  
            HIT + last admission   
   
IDDM type 2:  A1C: 7.6  
            Lantus bid   
            Humalog 4 U tid before meals   
   
Hypotension with baseline HTN   
            Continue midodrine             Holding all antihypertensives  
   
HLD: Home meds  
   
CAD with 3 vessel CABG and AVR 1/10/20  
            Continue coumadin and post op orders.   
   
Chronic Atrial fibrillation:  Continue meds  
   
Fluid overload   
            Dialysis   
   
ESRD on new HD  
            continue HD   
            Nephrology on board  
  
   
Respiratory Failure GRG - Care Day 7 (2/19/2020) by Viviana Xavier RN  
 
   
Review Status Review Entered Completed 2/21/2020 15:12  
   
Criteria Review Care Day: 7 Care Date: 2/19/2020 Level of Care: Telemetry Guideline Day 3 Level Of Care ( ) * Activity level acceptable   
(X) Floor to discharge 2/21/2020 15:12:10 EST by Devante Lindquist   
  [facility] Clinical Status ( ) * Ventilation status appropriate ( ) * Airway status acceptable   
(X) * Respiratory status acceptable ( ) * Stable chest findings ( ) * Neurologic status acceptable ( ) * Temperature status acceptable ( ) * No infection, or status acceptable ( ) * General Discharge Criteria met Interventions ( ) * No chest tube, or status acceptable ( ) * Intake acceptable ( ) * No inpatient interventions needed * Milestone Additional Notes The patient's chart is reviewed and the patient is discussed with the staff.  56 y.o.  male seen and evaluated at the request of Dr. Andrew Alcantara for the evaluation of respiratory failure. He was just discharged on 2/12 to [facility] and came right back the next day.  Was DC 2/12/20 from CABG and aortic valve replacement complicated by CKD requiring HD, HIT +, DVTs, wound left thigh on wound vac, pneumonia DC on levaquin Q 48 hours last dose 2/11/20. [facility] was DC on 2 L O2 however has been increased to 4L O2 via NC at STR yesterday when he arrived. he says he reports that during HD he felt dizzy  And had acute onset of SOB. He had chest CT with contrast- no PE, still LLL opacity.  Bronch 2/17 - no obstruction, small effusion. On Levaquin for ? Pneumonia.    S/p bronch with lavage with negative cultures  
   
Anxious and feels the buspar is not helping, wants to know if we can change his meds. Working with PT. Coughing up secretions. Vitals:  
  02/19/20 6668 02/19/20 0518 02/19/20 8808 02/19/20 2792 BP: 123/60     110/54 Pulse: 84     88 Resp: 18     18 Temp: 98.5 °F (36.9 °C)     97.9 °F (36.6 °C) SpO2: 94%   94% 93% Weight:   251 lb 3.2 oz (113.9 kg)      
Height:          
   
Intake and Output:   
02/17 1901 - 02/19 0700 In: 480 [P.O.:480] Out: 1225 [Urine:625] 02/19 0701 - 02/19 1900 In: 26 [P.O.:237] Out: -   
   
Physical Exam:   
Constitutional:  the patient is well developed and in no acute distress HEENT:  Sclera clear, pupils equal, oral mucosa moist  
Lungs: a few crackles from posterior. On 3 LPM   
Cardiovascular:  RRR without M,G,R  
Abd/GI: soft and non-tender; with positive bowel sounds. Ext: warm without cyanosis. There is no lower leg edema. Musculoskeletal: moves all four extremities with equal strength Skin:  no jaundice or rashes, ischemic digits Neuro: no gross neuro deficits Musculoskeletal: can't ambulate - needs assistance. No deformity Psychiatric: Calm.   
   
Review of Systems - Review of Systems Constitutional: Positive for malaise/fatigue. Respiratory: Positive for shortness of breath.    
Cardiovascular: Negative.    
Gastrointestinal: Positive for diarrhea. Medications,  
albuterol (PROVENTIL VENTOLIN) nebulizer solution 2.5 mg   
Dose: 2.5 mg  
Freq: EVERY 6 HOURS RESP Route: NEBULIZATION Start: 02/15/20 1400  
  
insulin glargine (LANTUS) injection 18 Units Dose: 18 Units Freq: EVERY BEDTIME Route: SC  
Start: 02/13/20 2200  
  
busPIRone (BUSPAR) tablet 10 mg   
Dose: 10 mg  
Freq: 3 TIMES DAILY Route: PO  
  
ALPRAZolam (XANAX) tablet 0.25 mg   
Dose: 0.25 mg  
Freq: 2 TIMES DAILY AS NEEDED Route: PO  
 PRN Reason: Anxiety Plan:  (Medical Decision Making)  
   
-- taper oxygen as tolerated. --change from buspar to lexapro since that is not helping  
--add xanax prn 0.25 mg BID, prn  
--complete 7 days of abx on 5/7 with cultures from BAL negative.  ?PNA  
--continue nebs  
--PT/OT  
--continue remaining treatment per pmd

## 2020-03-05 NOTE — PROGRESS NOTES
Renal Progress Note Admission Date: 2/13/2020 Subjective: The patient was seen on dialysis at 10:57 AM .  BP is stable. catheter is working well. Reports more urine Objective:  
 
Physical Exam:   
Patient Vitals for the past 8 hrs: 
 BP Pulse Weight 03/05/20 1033 105/71 80   
03/05/20 1003 105/58 74   
03/05/20 0936 103/56 79   
03/05/20 0907 103/57 76   
03/05/20 0834 97/53 77   
03/05/20 0802 119/43 82   
03/05/20 0753 131/68 80   
03/05/20 0709   116.6 kg (257 lb 1.6 oz) Gen: comfortable , NAD HEENT: moist membranes CV: S1, S2 
Lungs: Clear bilaterally Extem: no edema Current Facility-Administered Medications Medication Dose Route Frequency  warfarin (COUMADIN) tablet 6 mg  6 mg Oral QPM  
 sodium chloride (OCEAN) 0.65 % nasal squeeze bottle 2 Spray  2 Spray Both Nostrils BID  insulin lispro (HUMALOG) injection 8 Units  8 Units SubCUTAneous TIDAC  ferric gluconate (FERRLECIT) 125 mg in 0.9% sodium chloride 100 mL IVPB  125 mg IntraVENous DAILY  epoetin maritza-epbx (RETACRIT) 14,000 Units combo injection  14,000 Units SubCUTAneous Q7D  
 insulin glargine (LANTUS) injection 45 Units  45 Units SubCUTAneous QHS  insulin lispro (HUMALOG) injection   SubCUTAneous AC&HS  
 albuterol (PROVENTIL VENTOLIN) nebulizer solution 2.5 mg  2.5 mg Nebulization Q6HWA RT  
 polyethylene glycol (MIRALAX) packet 17 g  17 g Oral DAILY  clonazePAM (KlonoPIN) tablet 0.5 mg  0.5 mg Oral QHS PRN  
 traZODone (DESYREL) tablet 100 mg  100 mg Oral QHS  midodrine (PROAMITINE) tablet 10 mg  10 mg Oral TID WITH MEALS  tamsulosin (FLOMAX) capsule 0.4 mg  0.4 mg Oral QHS  busPIRone (BUSPAR) tablet 10 mg  10 mg Oral TID  ALPRAZolam (XANAX) tablet 0.5 mg  0.5 mg Oral QID PRN  
 sodium chloride (NS) flush 5-40 mL  5-40 mL IntraVENous Q8H  
 loperamide (IMODIUM) capsule 4 mg  4 mg Oral QID PRN  
 sodium chloride 3% hypertonic nebulizer soln  4 mL Nebulization BID RT  
  pantothenic ac-min oil-pet,hyd (AQUAPHOR) 41 % ointment   Topical DAILY  traMADol (ULTRAM) tablet 50 mg  50 mg Oral Q6H PRN  
 sodium chloride (NS) flush 5-40 mL  5-40 mL IntraVENous Q8H  
 sodium chloride (NS) flush 5-40 mL  5-40 mL IntraVENous PRN  
 acetaminophen (TYLENOL) tablet 650 mg  650 mg Oral Q4H PRN  
 naloxone (NARCAN) injection 0.4 mg  0.4 mg IntraVENous PRN  
 ondansetron (ZOFRAN) injection 4 mg  4 mg IntraVENous Q4H PRN  
 bisacodyL (DULCOLAX) tablet 5 mg  5 mg Oral DAILY PRN Data Review:  
 
LABS:  
Recent Results (from the past 12 hour(s)) PROTHROMBIN TIME + INR Collection Time: 03/05/20  6:19 AM  
Result Value Ref Range Prothrombin time 31.0 (H) 12.0 - 14.7 sec INR 2.9 METABOLIC PANEL, BASIC Collection Time: 03/05/20  6:19 AM  
Result Value Ref Range Sodium 139 136 - 145 mmol/L Potassium 4.4 3.5 - 5.1 mmol/L Chloride 104 98 - 107 mmol/L  
 CO2 29 21 - 32 mmol/L Anion gap 6 (L) 7 - 16 mmol/L Glucose 87 65 - 100 mg/dL BUN 29 (H) 8 - 23 MG/DL Creatinine 2.20 (H) 0.8 - 1.5 MG/DL  
 GFR est AA 38 (L) >60 ml/min/1.73m2 GFR est non-AA 31 (L) >60 ml/min/1.73m2 Calcium 8.1 (L) 8.3 - 10.4 MG/DL  
CBC W/O DIFF Collection Time: 03/05/20  6:19 AM  
Result Value Ref Range WBC 8.6 4.3 - 11.1 K/uL  
 RBC 2.76 (L) 4.23 - 5.6 M/uL HGB 8.0 (L) 13.6 - 17.2 g/dL HCT 25.9 (L) 41.1 - 50.3 % MCV 93.8 79.6 - 97.8 FL  
 MCH 29.0 26.1 - 32.9 PG  
 MCHC 30.9 (L) 31.4 - 35.0 g/dL  
 RDW 14.7 (H) 11.9 - 14.6 % PLATELET 225 858 - 444 K/uL MPV 9.3 (L) 9.4 - 12.3 FL ABSOLUTE NRBC 0.00 0.0 - 0.2 K/uL Plan: Active Problems: 
  DM type 2 (diabetes mellitus, type 2) ([facility] (1/14/2013) HLD (hyperlipidemia) (1/14/2013) Obesity, morbid ([facility] (10/19/2018) Type 2 diabetes with nephropathy ([facility] (8/26/2019) CAD (coronary artery disease) (1/10/2020) S/P CABG x 3 (1/10/2020) S/P AVR (1/10/2020) Atrial fibrillation ([facility] (1/13/2020) HIT (heparin-induced thrombocytopenia) ([facility] (1/23/2020) Hypotension (2/18/2020) Acute renal failure on dialysis ([facility] (2/25/2020) DNR (do not resuscitate) (2/26/2020) DEJUAN/CKD, possibly ESRD  
- ([place] [facility] schedule) -seen on HD, tolerating dialysis, catheter functioning well 
-creatinine down into the 2s, uop 500 cc last 24 hours recorded- monitoring for renal recovery.  
- may hold HD a couple of days to assess for renal recovery 
  
HIT/SHARON + 
  
ASHD s/p CABG 
  
Orthostatic Hypotension - on midodrine 
  
 Anemia - on IV Fe replacement and shelton

## 2020-03-05 NOTE — PROGRESS NOTES
Problem: Mobility Impaired (Adult and Pediatric) Goal: *Acute Goals and Plan of Care (Insert Text) Description LTG: (Reviewed and updated 3/2/20) (1.)Mr. Lani Hicks will move from supine to sit and sit to supine , scoot up and down and roll side to side INDEPENDENTLY within 7 treatment day(s) from flat surface. (2.)Mr. Lani Hicks will transfer from bed to chair and chair to bed with MODIFIED INDEPENDENCE using the least restrictive device within 7 treatment day(s). (3.)Mr. Lani Hicks will ambulate with STAND BY ASSIST for 150+ feet with the least restrictive device within 7 treatment day(s) while maintaining normal vital signs. (4.)Mr. Lani Hicks will perform exercises per HEP for 10+ minutes to improve strength and mobility within 7 days. ____________________________________________________________________________________________ Outcome: Progressing Towards Goal 
  
PHYSICAL THERAPY: Daily Note and PM 3/5/2020 INPATIENT: PT Visit Days : 2 Payor: Ramiro Macedo / Plan: Chuck Law / Product Type: [employer] /   
  
NAME/AGE/GENDER: Carlos Chilel is a 69 y.o. male PRIMARY DIAGNOSIS: Acute respiratory failure ([facility] [J96.00] Fluid overload [E87.70] Acute hypoxemic respiratory failure (HCC) Acute hypoxemic respiratory failure (HCC) Procedure(s) (LRB): BRONCHOSCOPY (N/A) BRONCHIAL ALVEOLAR LAVAGE (N/A) 17 Days Post-Op ICD-10: Treatment Diagnosis:  
 · Generalized Muscle Weakness (M62.81) · Difficulty in walking, Not elsewhere classified (R26.2) Precaution/Allergies: 
Heparin; Amoxicillin; Keflex [cephalexin]; and Pcn [penicillins] ASSESSMENT:  
 
Mr. Lani Hicks presents supine in bed upon contact and agreeable to therapy treatment this PM.  The patient performed bed mobility with min A and transfers with min A throughout. He performed supine to sit with verbal and manual cues for hand placement and technique.   He performed sit to stand with min A and verbal cues for hand placement. Upon standing he reported he was too dizzy and sat back down. After a seated rest break he stood again with cues and gait trained 5' with RW to the chair with verbal cues for walker management, posture, pacing, and safety awareness. He sat in the chair and took a seated rest break. He stood again and gait trained an additional 5'x2 with the the same and with seated rest breaks in between. He returned to the recliner chair and performed static and dynamic sitting balance with cues while working with OT toward their goals. The patient then requested to return to bed and gait trained a final 5' with the RW and continued cues for hand placement, posture, step length, and walker management. He sat on the edge of the bed and then performed sit to supine with min A and cues. The patient was positioned for comfort with needs in reach. BP was monitored throughout and is listed below. Overall the patient is making slow progress toward therapy goals as indicated by increased activity tolerance. Will continue skilled PT treatment as patient is still below functional baseline. Orthostatic BP 
110/46 supine 107/50 sitting 97/44 standing At this time, patient is appropriate for Co-treatment with occupational therapy due to patient's decreased overall endurance/tolerance levels, as well as need for high level skilled assistance to complete functional transfers/mobility and functional tasks. Ab Correa is appropriate for a multidisciplinary co-treatment of PT and OT to address goals of both disciplines. This section established at most recent assessment PROBLEM LIST (Impairments causing functional limitations): 1. Decreased Strength 2. Decreased ADL/Functional Activities 3. Decreased Transfer Abilities 4. Decreased Ambulation Ability/Technique 5. Decreased Balance 6. Decreased Activity Tolerance 7. Increased Fatigue 8. Increased Shortness of Breath 9. Edema/Girth INTERVENTIONS PLANNED: (Benefits and precautions of physical therapy have been discussed with the patient.) 1. Balance Exercise 2. Bed Mobility 3. Family Education 4. Gait Training 5. Home Exercise Program (HEP) 6. Neuromuscular Re-education/Strengthening 7. Therapeutic Activites 8. Therapeutic Exercise/Strengthening 9. Transfer Training TREATMENT PLAN: Frequency/Duration: 3 times a week for duration of hospital stay Rehabilitation Potential For Stated Goals: Good REHAB RECOMMENDATIONS (at time of discharge pending progress):   
Placement: It is my opinion, based on this patient's performance to date, that Mr. Elijah Rogers may benefit from intensive therapy at a [address] [place] [address] after discharge due to the functional deficits listed above that are likely to improve with skilled rehabilitation and concerns that he/she may be unsafe to be unsupervised at home due to a fall risk, safety concerns for transfers and ambulation at home. Equipment: ? To be determined HISTORY:  
History of Present Injury/Illness (Reason for Referral): Mr. Elijah Rogers is a 68 yo male with PMH of DM II, HTN, CAD s/p CABG, s/p aortic valve repair, JOAQUIM on bipap, multiple DVTs on coumadin, new HD pt, HIT +, necrosis of toes/fingers from levophed who presented from HD with c/o acute sudden onset of SOB. Was DC 2/12/20 from CABG and aortic valve replacement complicated by CKD requiring HD, HIT +, DVTs, wound left thigh on wound vac, pneumonia DC on levaquin Q 48 hours last dose 2/11/20. He was DC on 2 L O2 however has been increased to 4L O2 via NC at STR yesterday when he arrived. Today he was at HD and reports dizziness with sitting up and acute onset of SOB. Son at bedside and states pt was doing ok since DC yesterday until today at HD. INR 2.4.   CXR showed improving interstitial edema, unchanged left basilar opacity and left pleural effusion. Pt given lasix in ED. Pt is SOB when talking in short sentences. Denies CP, n/v.  Has had diarrhea however is not new since hospitalization. Past Medical History/Comorbidities:  
Mr. Stuart Acuña  has a past medical history of Arthritis, BPH (benign prostatic hyperplasia) (1/14/2013), CAD (coronary artery disease), DM type 2 (diabetes mellitus, type 2) ([facility] (dx 2004), Dyspnea, Gout (1/14/2013), HLD (hyperlipidemia) (1/14/2013), HTN (hypertension), Morbid obesity ([facility] (9/3/14), Psychiatric disorder, Rheumatic fever, Seasonal allergic rhinitis, Severe aortic valve stenosis, Thyroid disease, and Unspecified sleep apnea (2016). Mr. Stuart Acuña  has a past surgical history that includes hx tonsil and adenoidectomy; hx heart catheterization (12/23/2019); hx orthopaedic (Left); hx cataract removal (Bilateral, 2012); and ir insert tunl cvc w port over 5 years (2/4/2020). Social History/Living Environment:  
Home Environment: Private residence # Steps to Enter: 2 Rails to Enter: No 
One/Two Story Residence: One story Living Alone: No 
Support Systems: Spouse/Significant Other/Partner Patient Expects to be Discharged to[de-identified] Rehabilitation facility Current DME Used/Available at Home: Cane, straight, Walker, rolling Tub or Shower Type: Shower Prior Level of Function/Work/Activity: 
Lives with spouse, admit from rehab; has been using RW short distance ambulation, assist ADLs Personal Factors:   
      Sex:  male Age:  69 y.o. Overall Behavior:  agreeable Number of Personal Factors/Comorbidities that affect the Plan of Care: 
CAD, DM, DVTs, age 3+: HIGH COMPLEXITY EXAMINATION:  
Most Recent Physical Functioning:  
Gross Assessment: 
  
         
  
Posture: 
  
Balance: 
Sitting: Impaired Sitting - Static: Good (unsupported) Sitting - Dynamic: Good (unsupported) Standing: Impaired Standing - Static: Good Standing - Dynamic : Fair Bed Mobility: 
Rolling: Supervision Supine to Sit: Minimum assistance Sit to Supine: Minimum assistance Scooting: Stand-by assistance Interventions: Verbal cues; Safety awareness training;Manual cues Wheelchair Mobility: 
  
Transfers: 
Sit to Stand: Contact guard assistance Stand to Sit: Contact guard assistance Bed to Chair: Minimum assistance Interventions: Verbal cues; Safety awareness training Gait: 
  
Base of Support: Widened Speed/Federica: Pace decreased (<100 feet/min) Step Length: Right shortened;Left shortened Gait Abnormalities: Decreased step clearance;Trunk sway increased Distance (ft): 5 Feet (ft)(x4) Assistive Device: Walker, rolling;Gait belt Ambulation - Level of Assistance: Minimal assistance Interventions: Verbal cues; Safety awareness training;Visual/Demos Body Structures Involved: 1. Heart 2. Lungs 3. Muscles Body Functions Affected: 1. Cardio 2. Respiratory 3. Movement Related Activities and Participation Affected: 1. General Tasks and Demands 2. Mobility 3. Self Care Number of elements that affect the Plan of Care: 4+: HIGH COMPLEXITY CLINICAL PRESENTATION:  
Presentation: Evolving clinical presentation with changing clinical characteristics: MODERATE COMPLEXITY CLINICAL DECISION MAKIN[address] [place] [address] [facility] [facility]ks Basic Mobility Inpatient Short Form How much difficulty does the patient currently have. .. Unable A Lot A Little None 1. Turning over in bed (including adjusting bedclothes, sheets and blankets)? [] 1   [] 2   [x] 3   [] 4  
2. Sitting down on and standing up from a chair with arms ( e.g., wheelchair, bedside commode, etc.)   [] 1   [] 2   [x] 3   [] 4  
3. Moving from lying on back to sitting on the side of the bed? [] 1   [] 2   [x] 3   [] 4 How much help from another person does the patient currently need. .. Total A Lot A Little None 4. Moving to and from a bed to a chair (including a wheelchair)?    [] 1   [] 2   [x] 3   [] 4  
 5.  Need to walk in hospital room? [] 1   [] 2   [x] 3   [] 4  
6. Climbing 3-5 steps with a railing? [] 1   [x] 2   [] 3   [] 4  
© 2007, Trustees of [address] [place] [address] [address] [id number], under license to [employer]. All rights reserved Score:  Initial: 17 Most Recent: X (Date: -- ) Interpretation of Tool:  Represents activities that are increasingly more difficult (i.e. Bed mobility, Transfers, Gait). Medical Necessity:    
· Patient is expected to demonstrate progress in  
· strength, range of motion, balance, and coordination ·  to  
· decrease assistance required with overall functional mobility, transfers, ambulation · . · Patient demonstrates · good ·  rehab potential due to higher previous functional level. Reason for Services/Other Comments: 
· Patient · continues to require present interventions due to patient's inability to perform bed mobility, transfers, ambulation safely and effectively · . Use of outcome tool(s) and clinical judgement create a POC that gives a: Clear prediction of patient's progress: LOW COMPLEXITY  
  
 
 
 
TREATMENT:  
(In addition to Assessment/Re-Assessment sessions the following treatments were rendered) Pre-treatment Symptoms/Complaints:  \"I'm dizzy\" Pain: Initial:  
Pain Intensity 1: 0  Post Session: 0/10 Gait Training (  35 min):  Gait training to improve and/or restore physical functioning as related to mobility, strength and balance. Ambulated 5 Feet (ft)(x4) with Minimal assistance using a Walker, rolling;Gait belt and minimal Verbal cues; Safety awareness training;Visual/Demos related to their stance phase, stride length, push off and posture and walker management to promote proper body alignment, promote proper body posture and promote proper body mechanics. Instruction in performance of gait mechanics to correct stride length and posture and walker management.  
 
 
Neuromuscular Re-education: ( 10 min):  Exercise/activities per grid below to improve balance, posture and stable blood pressure. Required minimal verbal cues to promote static and dynamic balance in sitting. Today's treatment session addressed Decreased ADL/Functional Activities, Decreased Transfer Abilities, Decreased Ambulation Ability/Technique, Decreased Activity Tolerance, Decreased Pacing Skills and Increased Shortness of Breath to progress towards achieving goal(s) 1, 2, 3 and 4. During this session, Occupational Therapy addressed Activity tolerance to progress towards their discipline specific goal(s). Co-treatment was necessary to improve patient's ability to increase activity demands and ability to return to normal functional activity. Date: 
2/26/20 Date: 
 Date: 
  
ACTIVITY/EXERCISE AM PM AM PM AM PM  
APs 1 x 20 B Heel slides 1 x 20 B Hip ABD/ADD 1 x 15 B SLR 1 x 10 B       
        
        
        
B = bilateral; AA = active assistive; A = active; P = passive Braces/Orthotics/Lines/Etc:  
· Nasal cannula Treatment/Session Assessment:   
· Response to Treatment:  See above, mobility limited by hypotension · Interdisciplinary Collaboration:  
o Physical Therapy Assistant 
o Certified Occupational Therapy Assistant 
o Registered Nurse · After treatment position/precautions:  
o Supine in bed 
o Bed/Chair-wheels locked 
o Bed in low position 
o Call light within reach 
o RN notified · Compliance with Program/Exercises: Will assess as treatment progresses · Recommendations/Intent for next treatment session: \"Next visit will focus on advancements to more challenging activities\". Total Treatment Duration: PT Patient Time In/Time Out Time In: 1340 Time Out: 1425 Maria Guadalupe Shi, PTA

## 2020-03-05 NOTE — PROGRESS NOTES
Pt in room alert and oriented with some confusion. rr are even and unlabored O2 in place. abd is soft bowel sounds are active. abd distended no distress noted. Pt denies pain at current time. Dialysis port clean dry and intact. Dressings to BLE, left leg, and bottom are clean dry and intact. No new areas noted. Safety measures in place will continue to monitor.

## 2020-03-05 NOTE — PROGRESS NOTES
Problem: Self Care Deficits Care Plan (Adult) Goal: *Acute Goals and Plan of Care (Insert Text) Description 1. Patient will complete lower body bathing and dressing with Mod I and adaptive equipment as needed. 2. Patient will complete toileting with Mod I and adaptive equipment as needed. 3. Patient will tolerate 23 minutes of OT treatment with 2 rest breaks to increase activity tolerance for ADLs. 4. Patient will complete functional transfers with Mod I and adaptive equipment as needed. 5. Patient will complete functional transfer to sink to perform grooming with Mod I and adaptive equipment as needed. 6. Patient will perform pursed-lip breathing with one verbal and one visual cue to increase activity tolerance for performance of ADLs. Timeframe: 7 visits Outcome: Progressing Towards Goal 
 
OCCUPATIONAL THERAPY: Daily Note and PM  
 3/5/2020 INPATIENT: OT Visit Days: 5 Payor: Ramiro Macedo / Plan: Chuck Law / Product Type: [employer] Care Medicare /  
  
NAME/AGE/GENDER: Carlos Chilel is a 69 y.o. male PRIMARY DIAGNOSIS:  Acute respiratory failure ([facility] [address] [J96.00] Fluid overload [E87.70] Acute hypoxemic respiratory failure (HCC) Acute hypoxemic respiratory failure (HCC) Procedure(s) (LRB): BRONCHOSCOPY (N/A) BRONCHIAL ALVEOLAR LAVAGE (N/A) 17 Days Post-Op ICD-10: Treatment Diagnosis:  
 · Generalized Muscle Weakness (M62.81) · Difficulty in walking, Not elsewhere classified (R26.2) Precautions/Allergies: 
   Heparin; Amoxicillin; Keflex [cephalexin]; and Pcn [penicillins] ASSESSMENT:  
 
Mr. Lani Hicks  is a 69 y.o. male admitted for Acute respiratory failure. At baseline, patient lives with wife in one story home with 2 ZACHARIAH. Wife home and available 24/7 if needed. Pt is typically Mod I to independent for performance of ADLs and IADLs. Pt typically uses no DME for in home or community mobility, but does have [address] available if needed.  Reports that he is able to walk approximately 200 feet without any DME before having to stop to take a rest break due to becoming SOB. Pt reports no prior fall history. 3/5/2020 Pt presents in supine upon arrival. Pt's BP was monitored throughout treatment due to being orthostatic. Pt transferred to sitting with mod a and additional time. Pt sat edge of bed as BP was checked since supine position. His BP dropped from 110/46 in supine to 107/50 in sitting. Pt stood and his BP was re-taken and dropped to 88/44. Pt took another seated rest break for 2 minutes and his BP was retaken at 97/44. Pt was able to complete functional mobility to the edge of the bed and back to the chair and then rested and completed exercises listed below. After exercises, requested to go back to bed. Pt was assisted back to bed with min assist. Good effort. Continue POC. This section established at most recent assessment PROBLEM LIST (Impairments causing functional limitations): 1. Decreased Strength 2. Decreased ADL/Functional Activities 3. Decreased Transfer Abilities 4. Decreased Ambulation Ability/Technique 5. Decreased Balance 6. Increased Pain 7. Decreased Activity Tolerance 8. Decreased Pacing Skills 9. Increased Fatigue 10. Increased Shortness of Breath 11. Decreased Skin Integrity/Hygeine INTERVENTIONS PLANNED: (Benefits and precautions of occupational therapy have been discussed with the patient.) 1. Activities of daily living training 2. Adaptive equipment training 3. Balance training 4. Clothing management 5. Hygiene training 6. Neuromuscular re-eduation 7. Re-evaluation 8. Therapeutic activity 9. Therapeutic exercise TREATMENT PLAN: Frequency/Duration: Follow patient 3x/week to address above goals. Rehabilitation Potential For Stated Goals: Fair REHAB RECOMMENDATIONS (at time of discharge pending progress):   
Placement:  
It is my opinion, based on this patient's performance to date, that  Alaina Mcnally may benefit from intensive therapy at a [address] [place] [address] after discharge due to the functional deficits listed above that are likely to improve with skilled rehabilitation and concerns that he/she may be unsafe to be unsupervised at home due to decreased independence, safety, and activity tolerance. .  
Equipment: ? TBD   
    
 
 
 
OCCUPATIONAL PROFILE AND HISTORY:  
History of Present Injury/Illness (Reason for Referral): 
See H & P Past Medical History/Comorbidities:  
Mr. Alaina Mcnally  has a past medical history of Arthritis, BPH (benign prostatic hyperplasia) (1/14/2013), CAD (coronary artery disease), DM type 2 (diabetes mellitus, type 2) ([facility] [address] (dx 2004), Dyspnea, Gout (1/14/2013), HLD (hyperlipidemia) (1/14/2013), HTN (hypertension), Morbid obesity ([facility] [address] (9/3/14), Psychiatric disorder, Rheumatic fever, Seasonal allergic rhinitis, Severe aortic valve stenosis, Thyroid disease, and Unspecified sleep apnea (2016). Mr. Alaina Mcnally  has a past surgical history that includes hx tonsil and adenoidectomy; hx heart catheterization (12/23/2019); hx orthopaedic (Left); hx cataract removal (Bilateral, 2012); and ir insert tunl cvc w port over 5 years (2/4/2020). Social History/Living Environment:  
Home Environment: Private residence # Steps to Enter: 2 Rails to Enter: No 
One/Two Story Residence: One story Living Alone: No 
Support Systems: Spouse/Significant Other/Partner Patient Expects to be Discharged to[de-identified] Rehabilitation facility Current DME Used/Available at Home: Cane, straight, Walker, rolling Tub or Shower Type: Shower Prior Level of Function/Work/Activity: At baseline, patient lives with wife in one story home with 2 ZACHARIAH. Wife home and available 24/7 if needed. Pt is typically Mod I to independent for performance of ADLs and IADLs. Pt typically uses no DME for in home or community mobility, but does have [facility] available if needed.  Reports that he is able to walk approximately 200 feet without any DME before having to stop to take a rest break due to becoming SOB. Pt reports no prior fall history. Personal Factors:   
      Sex:  male Age:  69 y.o. Number of Personal Factors/Comorbidities that affect the Plan of Care: Extensive review of physical, cognitive, and psychosocial performance (3+):  HIGH COMPLEXITY ASSESSMENT OF OCCUPATIONAL PERFORMANCE[de-identified]  
Activities of Daily Living:  
Basic ADLs (From Assessment) Complex ADLs (From Assessment) Feeding: Minimum assistance Oral Facial Hygiene/Grooming: Minimum assistance Bathing: Moderate assistance Upper Body Dressing: Minimum assistance Lower Body Dressing: Moderate assistance Toileting: Moderate assistance Instrumental ADL Meal Preparation: Total assistance Homemaking: Total assistance Grooming/Bathing/Dressing Activities of Daily Living Bed/Mat Mobility Rolling: Supervision Supine to Sit: Minimum assistance Sit to Supine: Minimum assistance Sit to Stand: Contact guard assistance Stand to Sit: Contact guard assistance Bed to Chair: Minimum assistance Scooting: Stand-by assistance Most Recent Physical Functioning:  
Gross Assessment: 
  
         
  
Posture: 
Posture (WDL): Exceptions to [facility] Posture Assessment: Forward head, Rounded shoulders Balance: 
Sitting: Impaired Sitting - Static: Good (unsupported) Sitting - Dynamic: Good (unsupported) Standing: Impaired Standing - Static: Good Standing - Dynamic : Fair Bed Mobility: 
Rolling: Supervision Supine to Sit: Minimum assistance Sit to Supine: Minimum assistance Scooting: Stand-by assistance Interventions: Verbal cues; Safety awareness training;Manual cues Wheelchair Mobility: 
  
Transfers: 
Sit to Stand: Contact guard assistance Stand to Sit: Contact guard assistance Bed to Chair: Minimum assistance Interventions: Verbal cues; Safety awareness training Patient Vitals for the past 6 hrs: 
 BP BP Patient Position SpO2 Pulse 03/05/20 0907 103/57   76  
03/05/20 0936 103/56   79  
03/05/20 1003 105/58   74  
03/05/20 1033 105/71   80  
03/05/20 1104 98/69   80  
03/05/20 1117 105/58   84  
03/05/20 1445 117/63 At rest 90 % 93 Mental Status Neurologic State: Alert Orientation Level: Oriented X4 Cognition: Follows commands Perception: Appears intact Perseveration: No perseveration noted Safety/Judgement: Fall prevention Physical Skills Involved: 
1. Balance 2. Strength 3. Activity Tolerance 4. Fine Motor Control 5. Vision 6. Pain (acute) 7. Skin Integrity Cognitive Skills Affected (resulting in the inability to perform in a timely and safe manner): 1. Sustained Attention 2. Divided Attention Psychosocial Skills Affected: 1. Habits/Routines 2. Environmental Adaptation 3. Social Interaction Number of elements that affect the Plan of Care: 5+:  HIGH COMPLEXITY CLINICAL DECISION MAKING:  
[facility] AM-PAC 6 Clicks Daily Activity Inpatient Short Form How much help from another person does the patient currently need. .. Total A Lot A Little None 1. Putting on and taking off regular lower body clothing? [] 1   [x] 2   [] 3   [] 4  
2. Bathing (including washing, rinsing, drying)? [] 1   [x] 2   [] 3   [] 4  
3. Toileting, which includes using toilet, bedpan or urinal?   [] 1   [x] 2   [] 3   [] 4  
4. Putting on and taking off regular upper body clothing? [] 1   [] 2   [x] 3   [] 4  
5. Taking care of personal grooming such as brushing teeth? [] 1   [] 2   [x] 3   [] 4  
6. Eating meals? [] 1   [] 2   [x] 3   [] 4  
© 2007, Trustees of [facility], under license to [employer]. All rights reserved Score:  Initial: 15, completed, 2/15/2020 Most Recent: X (Date: -- ) Interpretation of Tool:  Represents activities that are increasingly more difficult (i.e. Bed mobility, Transfers, Gait). Medical Necessity:    
· Skilled intervention continues to be required due to decreased independence, safety, and activity tolerance. In addition, pt having increased dizziness that is limiting pt ability to perform ADLs and functional mobility. Viviana Bee Reason for Services/Other Comments: 
· Patient continues to require skilled intervention due to medical complications and patient unable to attend/participate in therapy as expected. Use of outcome tool(s) and clinical judgement create a POC that gives a: MODERATE COMPLEXITY  
 
 
 
TREATMENT:  
(In addition to Assessment/Re-Assessment sessions the following treatments were rendered) Pre-treatment Symptoms/Complaints:  \"I feel like poop. \" 
Pain: Initial:  
Pain Intensity 1:0 Post Session:  Unchanged Therapeutic Activity: (30 minutes): Therapeutic activities including Bed transfers, Chair transfers and static/dynamic standing to improve mobility, strength and balance. Required minimal Verbal cues; Safety awareness training;Visual/Demos to promote static and dynamic balance in standing. Therapeutic Exercise: (14 minutes):  Exercises per grid below to improve mobility and strength. Required minimal visual and verbal cues to promote proper body posture and promote proper body mechanics. Progressed range as indicated. B UE's  Date: 
3/2/2020 Date: 
3/5/2020 Date: Activity/Exercise Parameters Parameters Parameters Shoulder flex/ex 20 reps  15 reps Shoulder hor abd/add 20 reps  15 reps Punches  20 reps  15 reps Elbow flex/ex 20 reps  15 reps Ceiling punches   15 reps Braces/Orthotics/Lines/Etc:  
· Telemetry Monitor · O2 Device: Nasal cannula Treatment/Session Assessment:   
· Response to Treatment:  Good effort · Interdisciplinary Collaboration:  
o Physical Therapy Assistant 
o Certified Occupational Therapy Assistant 
o Registered Nurse · After treatment position/precautions:  
o Supine in bed 
o Bed/Chair-wheels locked 
o Bed in low position 
o Call light within reach 
o RN notified 
o Side rails x 2  
· Compliance with Program/Exercises: Compliant most of the time, Will assess as treatment progresses. · Recommendations/Intent for next treatment session: \"Next visit will focus on advancements to more challenging activities and reduction in assistance provided\". Total Treatment Duration: OT Patient Time In/Time Out Time In: 1340 Time Out: 1424 Ynes Denson

## 2020-03-05 NOTE — PROGRESS NOTES
Renal Progress Note Admission Date: 2/13/2020 Subjective:  
  
Patient seen and examined on HD, dialyzing via left  Qb, UF 3100, reports worked with PT yesterday with some orthostatic pressure, no issues over night. Tolerating dialysis. Labs and chart reviewed ROS:  
General; no fever/ chills, appetite ok Lung: + SOB 
CV: no CP 
GI: no N/V/D Ext: no edema Objective:  
 
Physical Exam:   
Patient Vitals for the past 8 hrs: 
 BP Temp Pulse Resp SpO2 Weight 03/05/20 0802 119/43  82     
03/05/20 0753 131/68  80     
03/05/20 0709      116.6 kg (257 lb 1.6 oz) 03/05/20 0219 116/52 98.4 °F (36.9 °C) 78 20 90 %  Gen: comfortable , NAD HEENT: moist membranes CV: S1, S2, regular rate, no Rub Lungs: Clear bilaterally, no wheezing GI: soft, non-tender, + BS Extem: no edema, no cyanosis Access: left TCC intact Current Facility-Administered Medications Medication Dose Route Frequency  warfarin (COUMADIN) tablet 6 mg  6 mg Oral QPM  
 sodium chloride (OCEAN) 0.65 % nasal squeeze bottle 2 Spray  2 Spray Both Nostrils BID  insulin lispro (HUMALOG) injection 8 Units  8 Units SubCUTAneous TIDAC  ferric gluconate (FERRLECIT) 125 mg in 0.9% sodium chloride 100 mL IVPB  125 mg IntraVENous DAILY  epoetin maritza-epbx (RETACRIT) 14,000 Units combo injection  14,000 Units SubCUTAneous Q7D  
 insulin glargine (LANTUS) injection 45 Units  45 Units SubCUTAneous QHS  insulin lispro (HUMALOG) injection   SubCUTAneous AC&HS  
 albuterol (PROVENTIL VENTOLIN) nebulizer solution 2.5 mg  2.5 mg Nebulization Q6HWA RT  
 polyethylene glycol (MIRALAX) packet 17 g  17 g Oral DAILY  clonazePAM (KlonoPIN) tablet 0.5 mg  0.5 mg Oral QHS PRN  
 traZODone (DESYREL) tablet 100 mg  100 mg Oral QHS  midodrine (PROAMITINE) tablet 10 mg  10 mg Oral TID WITH MEALS  tamsulosin (FLOMAX) capsule 0.4 mg  0.4 mg Oral QHS  busPIRone (BUSPAR) tablet 10 mg  10 mg Oral TID  ALPRAZolam (XANAX) tablet 0.5 mg  0.5 mg Oral QID PRN  
 sodium chloride (NS) flush 5-40 mL  5-40 mL IntraVENous Q8H  
 loperamide (IMODIUM) capsule 4 mg  4 mg Oral QID PRN  
 sodium chloride 3% hypertonic nebulizer soln  4 mL Nebulization BID RT  
 pantothenic ac-min oil-pet,hyd (AQUAPHOR) 41 % ointment   Topical DAILY  traMADol (ULTRAM) tablet 50 mg  50 mg Oral Q6H PRN  
 sodium chloride (NS) flush 5-40 mL  5-40 mL IntraVENous Q8H  
 sodium chloride (NS) flush 5-40 mL  5-40 mL IntraVENous PRN  
 acetaminophen (TYLENOL) tablet 650 mg  650 mg Oral Q4H PRN  
 naloxone (NARCAN) injection 0.4 mg  0.4 mg IntraVENous PRN  
 ondansetron (ZOFRAN) injection 4 mg  4 mg IntraVENous Q4H PRN  
 bisacodyL (DULCOLAX) tablet 5 mg  5 mg Oral DAILY PRN Data Review:  
 
LABS:  
Recent Results (from the past 12 hour(s)) GLUCOSE, POC Collection Time: 03/04/20  8:38 PM  
Result Value Ref Range Glucose (POC) 263 (H) 65 - 100 mg/dL PROTHROMBIN TIME + INR Collection Time: 03/05/20  6:19 AM  
Result Value Ref Range Prothrombin time 31.0 (H) 12.0 - 14.7 sec INR 2.9 METABOLIC PANEL, BASIC Collection Time: 03/05/20  6:19 AM  
Result Value Ref Range Sodium 139 136 - 145 mmol/L Potassium 4.4 3.5 - 5.1 mmol/L Chloride 104 98 - 107 mmol/L  
 CO2 29 21 - 32 mmol/L Anion gap 6 (L) 7 - 16 mmol/L Glucose 87 65 - 100 mg/dL BUN 29 (H) 8 - 23 MG/DL Creatinine 2.20 (H) 0.8 - 1.5 MG/DL  
 GFR est AA 38 (L) >60 ml/min/1.73m2 GFR est non-AA 31 (L) >60 ml/min/1.73m2 Calcium 8.1 (L) 8.3 - 10.4 MG/DL  
CBC W/O DIFF Collection Time: 03/05/20  6:19 AM  
Result Value Ref Range WBC 8.6 4.3 - 11.1 K/uL  
 RBC 2.76 (L) 4.23 - 5.6 M/uL HGB 8.0 (L) 13.6 - 17.2 g/dL HCT 25.9 (L) 41.1 - 50.3 % MCV 93.8 79.6 - 97.8 FL  
 MCH 29.0 26.1 - 32.9 PG  
 MCHC 30.9 (L) 31.4 - 35.0 g/dL  
 RDW 14.7 (H) 11.9 - 14.6 % PLATELET 426 740 - 858 K/uL  MPV 9.3 (L) 9.4 - 12.3 FL  
 ABSOLUTE NRBC 0.00 0.0 - 0.2 K/uL Plan: Active Problems: 
  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  
- ([facility] schedule) -seen on HD, tolerating dialysis, catheter functioning well 
-creatinine down into the 2s, uop 500 cc last 24 hours recorded- monitoring for renal recovery. 
  
HIT/SHARON + 
  
ASHD s/p CABG 
  
Orthostatic Hypotension - on midodrine Anemia - on IV Fe replacement and shelton

## 2020-03-05 NOTE — DIALYSIS
TRANSFER OUT- DIALYSIS Hemodialysis treatment completed without complications. Patient alert and VS stable  /58  P 84   
 
 1.5 Kgs removed. Flushed both ports with 10 mL of NS.  CVC dressing clean, dry, and intact, tego caps intact, bilateral lumens wrapped with 4x4 gauze. Patient to room 837 after dialysis.

## 2020-03-06 NOTE — PROGRESS NOTES
Administered tylenol as ordered PRN for mild pain or temperature > 100.4 F. Patient's temperature 100.7 F. Will recheck temp.

## 2020-03-06 NOTE — PROGRESS NOTES
Placed pt on BiPAP due to dyspnea at rest. Placed pt on large full face mask, minimal leak noted. Pt tolerating BiPAP at this time.

## 2020-03-06 NOTE — PROGRESS NOTES
Bedside and Verbal shift change report given to Iain Milligan RN (oncoming nurse) by Richardson Curling, RN (offgoing nurse). Report included the following information SBAR, Kardex, Intake/Output, MAR, Recent Results and Cardiac Rhythm NSR.

## 2020-03-06 NOTE — PROGRESS NOTES
SPEECH PATHOLOGY NOTE: 
 
Speech therapy consult received and appreciated. Attempted to see patient this AM for bedside swallow evaluation, however RT present. Will re-attempt at later time this date as patient becomes available. Addendum: second attempt, getting dressing changed. Will check back at later time. Addendum:Attempted to see patient again this PM. Patient eating his turkey sandwich and drinking reclined and sideways. Refused to be positioned upright and reports too short of breath to participate. Removed meal tray as patient continued eating, despite recommendations to hold off until SOB improves. RN notified. Will check back at later time/date as schedule permits. [address] CCC-SLP [address] CCC-SLP

## 2020-03-06 NOTE — PROGRESS NOTES
Pt unable to be weaned from BIPAP. MD is aware pt to be transferred to ICU for increased Dyspnea. Family is aware.

## 2020-03-06 NOTE — PROGRESS NOTES
Pharmacokinetic Consult to Pharmacist 
 
Rita Daily is a 69 y.o. male being treated for HAP with vacomycin. Height: 5' 10\" (177.8 cm)  Weight: 115.2 kg (253 lb 14.4 oz) Lab Results Component Value Date/Time BUN 25 (H) 03/06/2020 07:01 AM  
 Creatinine 2.06 (H) 03/06/2020 07:01 AM  
 WBC 7.5 03/06/2020 07:01 AM  
 Procalcitonin 0.78 02/13/2020 03:44 PM  
 Lactic acid 2.0 02/13/2020 03:44 PM  
  
Estimated Creatinine Clearance: 40.6 mL/min (A) (based on SCr of 2.06 mg/dL (H)). CULTURES: 
3/6 Blood cultures x 2 - NGTD 
2/17 Sputum - Normal respiratory yessenia Lab Results Component Value Date/Time Vancomycin, random 18.6 01/18/2020 04:26 AM  
 
 
Day 1 of vancomycin. Goal trough is 15-20. Will load with 2500 mg and then initiate 1500 mg every 24 hours for now. Pharmacy to order troughs and adjust the dose as appropriate. Will continue to follow patient. Thank you, Edda Michelle, PharmD, [facility] Clinical Pharmacy Specialist 
[phone]

## 2020-03-06 NOTE — PROGRESS NOTES
Patient's blood glucose 56. Patient alert and oriented, asymptomatic. Gave patient juice per the hypoglycemia protocol. Will recheck blood glucose.

## 2020-03-06 NOTE — PROGRESS NOTES
Renal Progress Note Admission Date: 2/13/2020 Subjective:  
  
Patient seen and examined on rounds, complaints of shortness of breath this am now on Airvo50 %- SOB better now,  recorded last 24 hours. Labs and chart reviewed- increased left basilar airspace consolidation on cxr yesterday ROS:  
General; no fever/ chills, appetite ok Lung: + SOB 
CV: no CP 
GI: no N/V/D Ext: no edema Objective:  
 
Physical Exam:   
Patient Vitals for the past 8 hrs: 
 BP Temp Pulse Resp SpO2  
03/06/20 0921     95 % 03/06/20 0824     95 % 03/06/20 0714 121/62 99.1 °F (37.3 °C) 89 27 92 % 03/06/20 0324 104/55 98.4 °F (36.9 °C) 88 18 92 % Gen: comfortable , NAD HEENT: moist membranes CV: S1, S2, regular rate, no Rub Lungs: Clear bilaterally, no wheezing GI: soft, non-tender, + BS Extem: no edema, no cyanosis Access: left TCC intact Current Facility-Administered Medications Medication Dose Route Frequency  cefepime (MAXIPIME) 2 g in 0.9% sodium chloride (MBP/ADV) 100 mL  2 g IntraVENous Q12H  
 vancomycin (VANCOCIN) 2500 mg in  mL infusion  2,500 mg IntraVENous ONCE  
 [START ON 3/7/2020] vancomycin (VANCOCIN) 1500 mg in  ml infusion  1,500 mg IntraVENous Q24H  warfarin (COUMADIN) tablet 6 mg  6 mg Oral QPM  
 sodium chloride (OCEAN) 0.65 % nasal squeeze bottle 2 Spray  2 Spray Both Nostrils BID  insulin lispro (HUMALOG) injection 8 Units  8 Units SubCUTAneous TIDAC  ferric gluconate (FERRLECIT) 125 mg in 0.9% sodium chloride 100 mL IVPB  125 mg IntraVENous DAILY  epoetin maritza-epbx (RETACRIT) 14,000 Units combo injection  14,000 Units SubCUTAneous Q7D  
 insulin glargine (LANTUS) injection 45 Units  45 Units SubCUTAneous QHS  insulin lispro (HUMALOG) injection   SubCUTAneous AC&HS  
 albuterol (PROVENTIL VENTOLIN) nebulizer solution 2.5 mg  2.5 mg Nebulization Q6HWA RT  
 polyethylene glycol (MIRALAX) packet 17 g  17 g Oral DAILY  clonazePAM (KlonoPIN) tablet 0.5 mg  0.5 mg Oral QHS PRN  
 traZODone (DESYREL) tablet 100 mg  100 mg Oral QHS  midodrine (PROAMITINE) tablet 10 mg  10 mg Oral TID WITH MEALS  tamsulosin (FLOMAX) capsule 0.4 mg  0.4 mg Oral QHS  busPIRone (BUSPAR) tablet 10 mg  10 mg Oral TID  ALPRAZolam (XANAX) tablet 0.5 mg  0.5 mg Oral QID PRN  
 sodium chloride (NS) flush 5-40 mL  5-40 mL IntraVENous Q8H  
 loperamide (IMODIUM) capsule 4 mg  4 mg Oral QID PRN  
 sodium chloride 3% hypertonic nebulizer soln  4 mL Nebulization BID RT  
 pantothenic ac-min oil-pet,hyd (AQUAPHOR) 41 % ointment   Topical DAILY  traMADol (ULTRAM) tablet 50 mg  50 mg Oral Q6H PRN  
 sodium chloride (NS) flush 5-40 mL  5-40 mL IntraVENous Q8H  
 sodium chloride (NS) flush 5-40 mL  5-40 mL IntraVENous PRN  
 acetaminophen (TYLENOL) tablet 650 mg  650 mg Oral Q4H PRN  
 naloxone (NARCAN) injection 0.4 mg  0.4 mg IntraVENous PRN  
 ondansetron (ZOFRAN) injection 4 mg  4 mg IntraVENous Q4H PRN  
 bisacodyL (DULCOLAX) tablet 5 mg  5 mg Oral DAILY PRN Data Review:  
 
LABS:  
Recent Results (from the past 12 hour(s)) GLUCOSE, POC Collection Time: 03/06/20  5:49 AM  
Result Value Ref Range Glucose (POC) 56 (L) 65 - 100 mg/dL GLUCOSE, POC Collection Time: 03/06/20  6:12 AM  
Result Value Ref Range Glucose (POC) 165 (H) 65 - 100 mg/dL PROTHROMBIN TIME + INR Collection Time: 03/06/20  7:01 AM  
Result Value Ref Range Prothrombin time 32.7 (H) 12.0 - 14.7 sec INR 3.1 METABOLIC PANEL, BASIC Collection Time: 03/06/20  7:01 AM  
Result Value Ref Range Sodium 138 136 - 145 mmol/L Potassium 4.6 3.5 - 5.1 mmol/L Chloride 103 98 - 107 mmol/L  
 CO2 29 21 - 32 mmol/L Anion gap 6 (L) 7 - 16 mmol/L Glucose 96 65 - 100 mg/dL BUN 25 (H) 8 - 23 MG/DL Creatinine 2.06 (H) 0.8 - 1.5 MG/DL  
 GFR est AA 41 (L) >60 ml/min/1.73m2 GFR est non-AA 34 (L) >60 ml/min/1.73m2 Calcium 7.9 (L) 8.3 - 10.4 MG/DL  
CBC W/O DIFF Collection Time: 03/06/20  7:01 AM  
Result Value Ref Range WBC 7.5 4.3 - 11.1 K/uL  
 RBC 2.90 (L) 4.23 - 5.6 M/uL HGB 8.4 (L) 13.6 - 17.2 g/dL HCT 27.2 (L) 41.1 - 50.3 % MCV 93.8 79.6 - 97.8 FL  
 MCH 29.0 26.1 - 32.9 PG  
 MCHC 30.9 (L) 31.4 - 35.0 g/dL  
 RDW 15.0 (H) 11.9 - 14.6 % PLATELET 281 898 - 930 K/uL MPV 9.1 (L) 9.4 - 12.3 FL ABSOLUTE NRBC 0.00 0.0 - 0.2 K/uL Plan: Active Problems: 
  DM type 2 (diabetes mellitus, type 2) ([facility] (1/14/2013) HLD (hyperlipidemia) (1/14/2013) Obesity, morbid ([facility] (10/19/2018) Type 2 diabetes with nephropathy ([facility] (8/26/2019) CAD (coronary artery disease) (1/10/2020) S/P CABG x 3 (1/10/2020) S/P AVR (1/10/2020) Atrial fibrillation ([facility] (1/13/2020) HIT (heparin-induced thrombocytopenia) ([facility] (1/23/2020) Hypotension (2/18/2020) Acute renal failure on dialysis ([facility] (2/25/2020) DNR (do not resuscitate) (2/26/2020) DEJUAN/CKD - ([place] TTS schedule) 
-last HD 3/5 
-creatinine continued to trend down, non oliguric, check labs in AM prior to further dialysis, strict I&O 
  
HIT/SHARON + 
  
ASHD s/p CABG 
  
Orthostatic Hypotension - on midodrine Anemia - on IV Fe replacement and shelton

## 2020-03-06 NOTE — PROGRESS NOTES
Pt is having increased Dyspnea at rest. Placed on BIPAP. Tolerating well at current time. Will continue to monitor.

## 2020-03-06 NOTE — PROGRESS NOTES
Bedside shift report completed with oncoming nurse, Dena Hodges. Patient resting quietly in bed at this time, awake, respirations even and unlabored. Denies needs at this time. Bed low and locked. Bedside table, personal belongings and call light within reach.

## 2020-03-06 NOTE — PROGRESS NOTES
Hospitalist Note Admit Date:  2020  3:03 PM  
Name:  Esha Almeida Age:  69 y.o. 
:  1945 MRN:  [id number] PCP:  Reina John MD 
Treatment Team: Attending Provider: Sandy Conner MD; Consulting Provider: Emily Vela MD; Utilization Review: Alcon Price RN; Hospitalist: Rk Pizano NP; Hospitalist: Nirali Graham NP; Consulting Provider: Deborah Yin MD; Consulting Provider: New Lopez MD; Care Manager: Balwinder Sanchez.; Utilization Review: Radha Madsen; Consulting Provider: Shayy Blankenship MD; Consulting Provider: Velma Ambrosio MD 
 
HPI/Subjective:  
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  
  
CXR showed interstitial edema/ left pleural effusion. CTA chest no PE. He completed a course of levaquin 20. He was seen by pulmonary s/p bronch 20. He had some hypotension started on midodrine. He had worsening depression, seen by tele psyche for suicidal ideation with sitter that resolved. Discharge plans pending due to recurrent insurance denials 3/6 Patient seen and examined at bedside in mild distress. Patient reports he is short of breath. He reports he feels that he cannot catch his breath. Objective:  
 
Patient Vitals for the past 24 hrs: 
 Temp Pulse Resp BP SpO2  
20 1748  85 25  99 % 20 1745  82 24 159/81 99 % 20 1731  81 20 146/66 98 % 20 1724     99 % 20 1716    127/60 98 % 20 1715  80 25  98 % 20 1706  82 23  96 % 20 1701    127/58 99 % 20 1630    124/58 95 % 20 1600 99.8 °F (37.7 °C) 98 21 120/58 95 % 20 1501     94 % 20 1325     92 % 20 1130    120/58  03/06/20 1128 (!) 100.5 °F (38.1 °C) 97 30 120/58 90 % 03/06/20 0921     95 % 03/06/20 0824     95 % 03/06/20 0714 99.1 °F (37.3 °C) 89 27 121/62 92 % 03/06/20 0324 98.4 °F (36.9 °C) 88 18 104/55 92 % 03/06/20 0015 99.4 °F (37.4 °C)      
03/05/20 2317 (!) 100.7 °F (38.2 °C) 93 18 150/53 92 % 03/05/20 1952     (!) 89 % 03/05/20 1949 99.9 °F (37.7 °C) (!) 102 18 114/76 90 % Oxygen Therapy O2 Sat (%): 99 % (03/06/20 1748) Pulse via Oximetry: 87 beats per minute (03/06/20 1748) O2 Device: BIPAP (03/06/20 1724) O2 Flow Rate (L/min): 50 l/min (03/06/20 0921) O2 Temperature: 87.8 °F (31 °C) (03/06/20 0921) FIO2 (%): 65 % (03/06/20 1724) Estimated body mass index is 36.43 kg/m² as calculated from the following: 
  Height as of this encounter: 5' 10\" (1.778 m). Weight as of this encounter: 115.2 kg (253 lb 14.4 oz). Intake/Output Summary (Last 24 hours) at 3/6/2020 1802 Last data filed at 3/6/2020 1725 Gross per 24 hour Intake 710 ml Output 200 ml Net 510 ml  
   
*Note that automatically entered I/Os may not be accurate; dependent on patient compliance with collection and accurate  by [employer]. General:    Elderly male in mild distress CV:   RRR. No murmur, rub, or gallop. Lungs:   CTAB. No wheezing, rhonchi, or rales. Using accessory muscles for respiration. Abdomen:   Soft, nontender, nondistended. Extremities: Warm and dry. No cyanosis or edema. Skin:     No rashes or jaundice. Neuro:  No gross focal deficits Data Review: 
I have reviewed all labs, meds, and studies from the last 24 hours: 
 
Recent Results (from the past 24 hour(s)) GLUCOSE, POC Collection Time: 03/05/20  8:28 PM  
Result Value Ref Range Glucose (POC) 300 (H) 65 - 100 mg/dL GLUCOSE, POC Collection Time: 03/06/20  5:49 AM  
Result Value Ref Range Glucose (POC) 56 (L) 65 - 100 mg/dL GLUCOSE, POC  Collection Time: 03/06/20  6:12 AM  
 Result Value Ref Range Glucose (POC) 165 (H) 65 - 100 mg/dL PROTHROMBIN TIME + INR Collection Time: 03/06/20  7:01 AM  
Result Value Ref Range Prothrombin time 32.7 (H) 12.0 - 14.7 sec INR 3.1 METABOLIC PANEL, BASIC Collection Time: 03/06/20  7:01 AM  
Result Value Ref Range Sodium 138 136 - 145 mmol/L Potassium 4.6 3.5 - 5.1 mmol/L Chloride 103 98 - 107 mmol/L  
 CO2 29 21 - 32 mmol/L Anion gap 6 (L) 7 - 16 mmol/L Glucose 96 65 - 100 mg/dL BUN 25 (H) 8 - 23 MG/DL Creatinine 2.06 (H) 0.8 - 1.5 MG/DL  
 GFR est AA 41 (L) >60 ml/min/1.73m2 GFR est non-AA 34 (L) >60 ml/min/1.73m2 Calcium 7.9 (L) 8.3 - 10.4 MG/DL  
CBC W/O DIFF Collection Time: 03/06/20  7:01 AM  
Result Value Ref Range WBC 7.5 4.3 - 11.1 K/uL  
 RBC 2.90 (L) 4.23 - 5.6 M/uL HGB 8.4 (L) 13.6 - 17.2 g/dL HCT 27.2 (L) 41.1 - 50.3 % MCV 93.8 79.6 - 97.8 FL  
 MCH 29.0 26.1 - 32.9 PG  
 MCHC 30.9 (L) 31.4 - 35.0 g/dL  
 RDW 15.0 (H) 11.9 - 14.6 % PLATELET 069 842 - 214 K/uL MPV 9.1 (L) 9.4 - 12.3 FL ABSOLUTE NRBC 0.00 0.0 - 0.2 K/uL GLUCOSE, POC Collection Time: 03/06/20 11:14 AM  
Result Value Ref Range Glucose (POC) 121 (H) 65 - 100 mg/dL GLUCOSE, POC Collection Time: 03/06/20  5:13 PM  
Result Value Ref Range Glucose (POC) 146 (H) 65 - 100 mg/dL PLEASE READ & DOCUMENT PPD TEST IN 72 HRS Collection Time: 03/06/20  5:17 PM  
Result Value Ref Range PPD Negative Negative  
 mm Induration 0 0 - 5 mm All Micro Results Procedure Component Value Units Date/Time CULTURE, RESPIRATORY/SPUTUM/BRONCH Benji Turner STAIN [[id number]] Order Status:  Sent Specimen:  Sputum CULTURE, BLOOD [[id number]] Collected:  03/06/20 3075 Order Status:  Completed Specimen:  Blood Updated:  03/06/20 7428 CULTURE, BLOOD [[id number]] Collected:  03/06/20 0844 Order Status:  Completed Specimen:  Blood Updated:  03/06/20 3322 C. DIFFICILE AG & TOXIN A/B [[id number]] Order Status:  Canceled Specimen:  Stool CULTURE, RESPIRATORY/SPUTUM/BRONCH Suni Tabor [[id number]] Collected:  02/17/20 1220 Order Status:  Completed Specimen:  Sputum from Bronchial Washing Updated:  02/24/20 1244 Special Requests: NO SPECIAL REQUESTS     
  GRAM STAIN 20 TO 35 WBCS SEEN PER OIF  
   1 TO 2 EPITHELIAL CELLS SEEN PER OIF  
      
  MODERATE GRAM POSITIVE COCCI  
     
   FEW GRAM POSITIVE RODS Culture result:    
  LIGHT NORMAL RESPIRATORY BRENNAN No results found for this visit on 02/13/20. Current Meds: 
Current Facility-Administered Medications Medication Dose Route Frequency  cefepime (MAXIPIME) 2 g in 0.9% sodium chloride (MBP/ADV) 100 mL  2 g IntraVENous Q12H  
 [START ON 3/7/2020] vancomycin (VANCOCIN) 1500 mg in  ml infusion  1,500 mg IntraVENous Q24H  
 albuterol-ipratropium (DUO-NEB) 2.5 MG-0.5 MG/3 ML  3 mL Nebulization Q6H RT  
 warfarin (COUMADIN) tablet 6 mg  6 mg Oral QPM  
 sodium chloride (OCEAN) 0.65 % nasal squeeze bottle 2 Spray  2 Spray Both Nostrils BID  insulin lispro (HUMALOG) injection 8 Units  8 Units SubCUTAneous TIDAC  ferric gluconate (FERRLECIT) 125 mg in 0.9% sodium chloride 100 mL IVPB  125 mg IntraVENous DAILY  epoetin maritza-epbx (RETACRIT) 14,000 Units combo injection  14,000 Units SubCUTAneous Q7D  
 insulin glargine (LANTUS) injection 45 Units  45 Units SubCUTAneous QHS  insulin lispro (HUMALOG) injection   SubCUTAneous AC&HS  polyethylene glycol (MIRALAX) packet 17 g  17 g Oral DAILY  clonazePAM (KlonoPIN) tablet 0.5 mg  0.5 mg Oral QHS PRN  
 traZODone (DESYREL) tablet 100 mg  100 mg Oral QHS  midodrine (PROAMITINE) tablet 10 mg  10 mg Oral TID WITH MEALS  tamsulosin (FLOMAX) capsule 0.4 mg  0.4 mg Oral QHS  busPIRone (BUSPAR) tablet 10 mg  10 mg Oral TID  ALPRAZolam (XANAX) tablet 0.5 mg  0.5 mg Oral QID PRN  
 sodium chloride (NS) flush 5-40 mL  5-40 mL IntraVENous Q8H  
  loperamide (IMODIUM) capsule 4 mg  4 mg Oral QID PRN  pantothenic ac-min oil-pet,hyd (AQUAPHOR) 41 % ointment   Topical DAILY  traMADol (ULTRAM) tablet 50 mg  50 mg Oral Q6H PRN  
 sodium chloride (NS) flush 5-40 mL  5-40 mL IntraVENous Q8H  
 sodium chloride (NS) flush 5-40 mL  5-40 mL IntraVENous PRN  
 acetaminophen (TYLENOL) tablet 650 mg  650 mg Oral Q4H PRN  
 naloxone (NARCAN) injection 0.4 mg  0.4 mg IntraVENous PRN  
 ondansetron (ZOFRAN) injection 4 mg  4 mg IntraVENous Q4H PRN  
 bisacodyL (DULCOLAX) tablet 5 mg  5 mg Oral DAILY PRN Other Studies (last 24 hours): Xr Chest Sngl V Result Date: 3/5/2020 History: Shortness of breath with diminished left airway sounds Exam: portable chest Comparison: 3/4/2020 Findings: There is dense consolidation again noted at the left lung base, increased when compared with the prior exam. There is also a left pleural effusion. The right lung is clear. No change in the appearance of the support monitoring devices. Impressions: Increasing left basilar airspace consolidation. Assessment and Plan:  
 
Hospital Problems as of 3/6/2020 Date Reviewed: 2/24/2020 Codes Class Noted - Resolved POA  
 DNR (do not resuscitate) (Chronic) ICD-10-CM: K41 ICD-9-CM: V49.86  2/26/2020 - Present Yes Acute renal failure on dialysis [facility]) ICD-10-CM: N17.9, Z99.2 ICD-9-CM: 584.9, V45.11  2/25/2020 - Present Yes Hypotension (Chronic) ICD-10-CM: I95.9 ICD-9-CM: 458.9  2/18/2020 - Present Yes HIT (heparin-induced thrombocytopenia) (HCC) (Chronic) ICD-10-CM: H74.44 ICD-9-CM: 289.84  1/23/2020 - Present Yes Atrial fibrillation (HCC) (Chronic) ICD-10-CM: I48.91 
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 
ICD-9-CM: 250.40, 583.81  8/26/2019 - Present Yes Obesity, morbid ([facility] (Chronic) ICD-10-CM: E66.01 
ICD-9-CM: 278.01  10/19/2018 - Present Yes DM type 2 (diabetes mellitus, type 2) (HCC) (Chronic) ICD-10-CM: E11.9 ICD-9-CM: 250.00  1/14/2013 - Present Yes HLD (hyperlipidemia) (Chronic) ICD-10-CM: P56.0 ICD-9-CM: 272.4  1/14/2013 - Present Yes RESOLVED: Acute-on-chronic kidney injury ([facility] ICD-10-CM: N17.9, N18.9 ICD-9-CM: 584.9, 585.9  2/17/2020 - 2/25/2020 Yes RESOLVED: Atelectasis ICD-10-CM: J98.11 ICD-9-CM: 518.0  2/17/2020 - 2/25/2020 Yes RESOLVED: Fluid overload ICD-10-CM: E87.70 ICD-9-CM: 276.69  2/15/2020 - 2/25/2020 Yes RESOLVED: HCAP (healthcare-associated pneumonia) ICD-10-CM: J18.9 ICD-9-CM: 904  2/14/2020 - 2/25/2020 Yes RESOLVED: Pleural effusion ICD-10-CM: J90 ICD-9-CM: 511.9  2/3/2020 - 2/25/2020 Yes * (Principal) RESOLVED: Acute hypoxemic respiratory failure ([facility] ICD-10-CM: J96.01 
ICD-9-CM: 518.81  1/10/2020 - 2/25/2020 Yes RESOLVED: HTN (hypertension) ICD-10-CM: I10 
ICD-9-CM: 401.9  1/14/2013 - 2/25/2020 Yes Plan: 
 
Acute respiratory failure/pneumonia CXR: Worsening left-sided consolidation Went from nasal cannula to BiPAP on 65% O2 Had previously completed course of IV antibiotics Infectious disease consulted for antibiotic recommendations Plan: 
-Continue BiPAP 
-Transfer to ICU 
-Vanco and cefepime ESRD on HD 
TTS Plan: 
-HD as per nephro Orthostatic hypotension Systolic blood pressure in 70s upon standing Plan: 
-Orthostatic BP 2 times daily 
-If patient continues to have orthostatic hypotension will initiate fludrocortisone 0.1 mg daily 
-Continue Midrin 10 mg 3 times daily Diabetes mellitus 
-Hold home medications -POC before meals and at bedtime 
-Insulin sliding scale 
-Long-acting insulin: 45 units Lantus DVT 
-Continue warfarin CAD Status post CABG and AVR 1/10/2020 Atrial Fibrillation Currently Rate Controlled Anticoagulated with Coumadin Plan: 
-Continue Home Meds Depression No longer suicidal 
Denies suicidal homicidal ideation Plan: 
-Continue BuSpar 10 mg 3 times daily DC planning/Dispo:   Placement pending Diet:  DIET DIABETIC CONSISTENT CARB 
DIET NUTRITIONAL SUPPLEMENTS 
DVT ppx: Coumadin Signed: 
Piter Mishra MD

## 2020-03-06 NOTE — PROGRESS NOTES
[facility] Check Admission Date: 2/13/2020 Daily Progress Note: 3/6/2020 
 
  
 73 yo CM with a history of CAD s/p CABG with AVR 1/10/20 by Dr. Andreina Underwood complicated by CKD requiring HD, HIT +, DVTs, wound left thigh and pneumonia. Was discharged on Levaquin Q 48 hours last dose 2/11/20. Jesus Kumar was discharged on NC 2 L but has been increased to NC 4L O2 at STR. He presented to the ER on 2/15 with increased SOB. CTA chest was negative for PE but persistent LLL opacity. He had a bronch 2/17 with negative cultures and was treated for PNA using levaquin. He is now ESRD and being followed by nephrology. We were reconsulted on 3/6 for acute respiratory failure requiring bipap. CXR shows increased consolidation in the left base. He is febrile with a temp of 100. 5. he has refused a swallow evaluation. ID is following and he was started on Vanc and Cefepime on 3/6. Subjective:  
  Patient wearing bipap but able to converse. Confirms cat 3/DNR status. Son at bedside. Reports coughing more over past 2 days - ongoing dyspnea. Current Facility-Administered Medications Medication Dose Route Frequency  cefepime (MAXIPIME) 2 g in 0.9% sodium chloride (MBP/ADV) 100 mL  2 g IntraVENous Q12H  
 [START ON 3/7/2020] vancomycin (VANCOCIN) 1500 mg in  ml infusion  1,500 mg IntraVENous Q24H  
 albuterol-ipratropium (DUO-NEB) 2.5 MG-0.5 MG/3 ML  3 mL Nebulization Q6H RT  
 warfarin (COUMADIN) tablet 6 mg  6 mg Oral QPM  
 sodium chloride (OCEAN) 0.65 % nasal squeeze bottle 2 Spray  2 Spray Both Nostrils BID  insulin lispro (HUMALOG) injection 8 Units  8 Units SubCUTAneous TIDAC  ferric gluconate (FERRLECIT) 125 mg in 0.9% sodium chloride 100 mL IVPB  125 mg IntraVENous DAILY  epoetin maritza-epbx (RETACRIT) 14,000 Units combo injection  14,000 Units SubCUTAneous Q7D  
 insulin glargine (LANTUS) injection 45 Units  45 Units SubCUTAneous QHS  insulin lispro (HUMALOG) injection   SubCUTAneous AC&HS  polyethylene glycol (MIRALAX) packet 17 g  17 g Oral DAILY  clonazePAM (KlonoPIN) tablet 0.5 mg  0.5 mg Oral QHS PRN  
 traZODone (DESYREL) tablet 100 mg  100 mg Oral QHS  midodrine (PROAMITINE) tablet 10 mg  10 mg Oral TID WITH MEALS  tamsulosin (FLOMAX) capsule 0.4 mg  0.4 mg Oral QHS  busPIRone (BUSPAR) tablet 10 mg  10 mg Oral TID  ALPRAZolam (XANAX) tablet 0.5 mg  0.5 mg Oral QID PRN  
 sodium chloride (NS) flush 5-40 mL  5-40 mL IntraVENous Q8H  
 loperamide (IMODIUM) capsule 4 mg  4 mg Oral QID PRN  pantothenic ac-min oil-pet,hyd (AQUAPHOR) 41 % ointment   Topical DAILY  traMADol (ULTRAM) tablet 50 mg  50 mg Oral Q6H PRN  
 sodium chloride (NS) flush 5-40 mL  5-40 mL IntraVENous Q8H  
 sodium chloride (NS) flush 5-40 mL  5-40 mL IntraVENous PRN  
 acetaminophen (TYLENOL) tablet 650 mg  650 mg Oral Q4H PRN  
 naloxone (NARCAN) injection 0.4 mg  0.4 mg IntraVENous PRN  
 ondansetron (ZOFRAN) injection 4 mg  4 mg IntraVENous Q4H PRN  
 bisacodyL (DULCOLAX) tablet 5 mg  5 mg Oral DAILY PRN Objective:  
 
Vitals:  
 03/06/20 1128 03/06/20 1130 03/06/20 1325 03/06/20 1501 BP: 120/58 120/58 Pulse: 97 Resp: 30 Temp: (!) 100.5 °F (38.1 °C) SpO2: 90%  92% 94% Weight:      
Height:      
 
Intake and Output:  
03/04 1901 - 03/06 0700 In: 480 [P.O.:480] Out: 2050 [Urine:550] No intake/output data recorded. Physical Exam:  
Constitutional:  the patient is well developed and in no acute distress HEENT:  Sclera clear, pupils equal, oral mucosa moist 
Lungs: crackles bilaterally - decreased on the left. Wearing bipap 15/8 with TV of 550 mls, rate 12, 65% Cardiovascular:  RRR without M,G,R 
Abd/GI: soft and non-tender; with positive bowel sounds. Ext: warm without cyanosis. There is no lower leg edema. Musculoskeletal: moves all four extremities with equal strength Skin:  no jaundice or rashes,  Multiple wounds - sacrum, ischemic toes, thigh Neuro: no gross neuro deficits. Awake and interactive Musculoskeletal: ? can ambulate - in bed at present, not witnessed. No deformity Psychiatric: Calm. Review of Systems - General ROS: positive for  - fever Respiratory ROS: positive for - cough and shortness of breath Cardiovascular ROS: positive for - none Gastrointestinal ROS: positive for - diarrhea Musculoskeletal ROS: positive for - muscular weakness Neurological ROS: positive for - none Lines: dialysis catheter CHEST XRAY:   
 
 
LAB Recent Labs 03/06/20 
0701 03/05/20 
0893 03/04/20 
4820 WBC 7.5 8.6 11.6* HGB 8.4* 8.0* 8.6* HCT 27.2* 25.9* 28.3*  
 244 280 INR 3.1 2.9 2.7 Recent Labs 03/06/20 
0701 03/05/20 
3778 03/04/20 
9985  139 141  
K 4.6 4.4 4.0  
 104 105 CO2 29 29 32 GLU 96 87 77 BUN 25* 29* 24* CREA 2.06* 2.20* 1.94* Assessment:  (Medical Decision Making) Hospital Problems  Date Reviewed: 2/24/2020 Codes Class Noted POA  
 DNR (do not resuscitate) (Chronic) ICD-10-CM: D06 ICD-9-CM: V49.86  2/26/2020 Yes Acute renal failure on dialysis [facility]) ICD-10-CM: N17.9, Z99.2 ICD-9-CM: 584.9, V45.11  2/25/2020 Yes Hypotension (Chronic) ICD-10-CM: I95.9 ICD-9-CM: 458.9  2/18/2020 Yes HIT (heparin-induced thrombocytopenia) (HCC) (Chronic) ICD-10-CM: M14.35 ICD-9-CM: 289.84  1/23/2020 Yes Atrial fibrillation (HCC) (Chronic) ICD-10-CM: I48.91 
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 
ICD-9-CM: 250.40, 583.81  8/26/2019 Yes Obesity, morbid ([facility] [address] (Chronic) ICD-10-CM: E66.01 
ICD-9-CM: 278.01  10/19/2018 Yes DM type 2 (diabetes mellitus, type 2) (HCC) (Chronic) ICD-10-CM: E11.9 ICD-9-CM: 250.00  1/14/2013 Yes HLD (hyperlipidemia) (Chronic) ICD-10-CM: O22.2 ICD-9-CM: 272.4  1/14/2013 Yes Plan:  (Medical Decision Making) 1. Transferring to ICU for bipap therapy 2. CXR with increased consolidation left base. ID has restarted abx with new fever - now on cefepime and vanc. Blood and sputum cultures pending. Had bronch on 2/17 with negative cultures then 3. ST to evaluate swallow 4. Add 3% saline with EZ PAP for now - rebronch if needed. Follow up CXR tomorrow, monitor oxygen needs 5. Confirmed DNR status with patient and son at bedside 6. Dialysis per nephrology - blood pressure better. On midodrine 7. 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 Lungs:  Coarse crackles frankie Heart:  RRR with no Murmur/Rubs/Gallops Additional Comments:  Has been doing well but now with new HCAP, hypoxemia, fevers, increased WOB. Will go to ICU, ? If aspirating, needs eval once off BIPAP,  He continues to be DNR I have spoken with and examined the patient. I agree with the above assessment and plan as documented.  
 
Marciano Foster MD

## 2020-03-06 NOTE — PROGRESS NOTES
Patient complaining of shortness of breath. Respiratory therapy in to assess patient. Respiratory therapy, Jerson, reports patient is completely diminished on the left and recommends a CXR. Notified Dr. Maurice Gary via REQQI at 2644. Dr. Maurice Gary placed new orders for a chest x r ay at approximately 2005.

## 2020-03-06 NOTE — PROGRESS NOTES
TRANSFER - IN REPORT: 
 
Verbal report received from April, RN on Maye Gloss  being received from 837(unit) for urgent transfer Report consisted of patients Situation, Background, Assessment and  
Recommendations(SBAR). Information from the following report(s) SBAR, Kardex, Intake/Output, MAR and Recent Results was reviewed with the receiving nurse. Opportunity for questions and clarification was provided. Assessment completed upon patients arrival to unit and care assumed.

## 2020-03-06 NOTE — CONSULTS
Infectious Disease Consult Today's Date: 3/6/2020 Admit Date: 2/13/2020 Impression: · Acute respiratory failure with low grade fevers · Recent CABG/AVR with PPM (1/16/20) · DEJUAN on CKD, now on HD. 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 · Follow BCs (at risk due to HD line) · Continue Vancomycin and Cefepime for now. Consider de-escalating in next 24-48h once infectious etiology ruled out. Anti-infectives: · Vanc/Cefepime 3/6- · Levaquin 2/9-2/20 Subjective:  
Date of Consultation:  March 6, 2020 Referring Physician: Loretta Sky Patient is a 69 y.o. 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 CXR showed improving interstitial edema, unchanged left basilar opacity and left pleural effusion. Chest CT showed unchanged in general, no PE. He had been discharged on levaquin and this antibiotic was  continued with EOT 2/20/20. Pulmonary consulted.  Had bronch 2/17 with findings of no obstruction, small effusion. No cultures but was diagnosed with PNA s/p Levaquin. ID consulted for recurrent PNA. Low grade temps at 100.5F, no leukocytosis. Sputum cultures NRF. BCx2 drawn today. CXR with dense consolidation to L lobes. Ongoing issues with depression and acute on chronic respiratory failure. Currently with tachypnea, Sao2 90%. Respiratory therapist in room to perform ABG and place on Bipap. Denies abdominal pain, dysuria, diarrhea, N/V, dysphagia, extremity pain, skin lesions, chills or sweats. Patient Active Problem List  
Diagnosis Code  DM type 2 (diabetes mellitus, type 2) ([facility] [address] E11.9  
 Gout M10.9  BPH (benign prostatic hyperplasia) N40.0  Seasonal allergic rhinitis J30.2  
 HLD (hyperlipidemia) E78.5  Nonrheumatic aortic valve stenosis I35.0  Obesity, morbid ([facility] [address] E66.01  
 Type 2 diabetes with nephropathy ([facility]) E11.21  
 Bilateral carotid artery stenosis I65.23  
 Aortic valve stenosis I35.0  
 CAD (coronary artery disease) I25.10  
 S/P CABG x 3 Z95.1  
 S/P AVR Z95.2  Atrial fibrillation (HCC) I48.91  
 HIT (heparin-induced thrombocytopenia) ([facility]) D75.82  
 Hypotension I95.9  Acute renal failure on dialysis ([facility]) N17.9, Z99.2  DNR (do not resuscitate) [phone] Past Medical History:  
Diagnosis Date  Arthritis  BPH (benign prostatic hyperplasia) 1/14/2013  CAD (coronary artery disease)  DM type 2 (diabetes mellitus, type 2) ([facility] [address] dx 2004 Type 2, avg fbs , recognizes hypo at 67, last HA1C was 9.2 on 5/2014  Dyspnea   
 at times, Uses Albuterol inhaler when needed (last used first of aug 2014)  Gout 1/14/2013  HLD (hyperlipidemia) 1/14/2013  
 HTN (hypertension)   
 controlled with meds  Morbid obesity ([facility] [address] 9/3/14 BMI 41.7  Psychiatric disorder   
 anxiety, controlled with buspar  Rheumatic fever   
 as a child  Seasonal allergic rhinitis   
 treated with Allegra  Severe aortic valve stenosis   
 echo 09/11/19 EF 60-65%- mild to moderate regurgitation  Thyroid disease   
 hx- no longer takes synthroid  Unspecified sleep apnea 2016  
 bi pap Family History Problem Relation Age of Onset  Lung Disease Mother   
     copd  Cancer Father   
     lympnode cancer  No Known Problems Brother  Cancer Other   
     fmh lymphoma  Diabetes Other   
     fmhx  Diabetes Maternal Grandfather Social History Tobacco Use  Smoking status: Never Smoker  Smokeless tobacco: Never Used Substance Use Topics  Alcohol use: Yes Alcohol/week: 0.0 standard drinks Comment: rarely Past Surgical History:  
Procedure Laterality Date  HX CATARACT REMOVAL Bilateral 2012  HX HEART CATHETERIZATION  12/23/2019  HX ORTHOPAEDIC Left   
 carpal tunnel  HX TONSIL AND ADENOIDECTOMY  IR INSERT TUNL CVC W PORT OVER 5 YEARS  2/4/2020 Prior to Admission medications Medication Sig Start Date End Date Taking? Authorizing Provider  
warfarin (COUMADIN) 2.5 mg tablet Take 1 Tab by mouth every evening. 2/12/20  Yes Carlita Sparks PA  
insulin glargine (LANTUS) 100 unit/mL injection 18 Units by SubCUTAneous route nightly. 2/12/20  Yes Marck Sparks PA  
busPIRone (BUSPAR) 10 mg tablet Take 1 Tab by mouth three (3) times daily. 2/12/20  Yes Marck Sparks PA  
mirtazapine (REMERON) 15 mg tablet Take 1 Tab by mouth nightly. 2/12/20   Marck Sparks PA  
ALPRAZolam (XANAX) 0.25 mg tablet Take 1 Tab by mouth three (3) times daily as needed for Anxiety. Max Daily Amount: 0.75 mg. 2/12/20   Carlita Sparks PA  
loperamide (IMODIUM) 2 mg capsule Take 1 Cap by mouth every four (4) hours as needed for Diarrhea. 2/12/20   Carlita Sparks PA  
insulin glargine (LANTUS) 100 unit/mL injection 28 Units by SubCUTAneous route daily. 2/12/20   Marck Sparks PA  
traMADol (ULTRAM) 50 mg tablet Take 1 Tab by mouth every six (6) hours as needed for Pain for up to 30 days. Max Daily Amount: 200 mg. 2/12/20 3/13/20  Marck Sparks PA  
allopurinol (ZYLOPRIM) 300 mg tablet Take 1 Tab by mouth daily. 2/8/19   Mara Wilson MD  
albuterol (PROVENTIL HFA) 90 mcg/actuation inhaler Take 2 Puffs by inhalation every six (6) hours as needed for Wheezing. 9/5/17   Mara Wilson MD  
lutein 20 mg tab Take 1 tablet by mouth daily. Provider, Historical  
coenzyme q10-vitamin e (COQ10 ) 100-100 mg-unit cap Take 300 mg by mouth daily. Provider, Historical  
ascorbic acid (VITAMIN C) 500 mg tablet Take 1,000 mg by mouth daily.     Provider, Historical  
 multivitamins-minerals-lutein (CENTRUM SILVER) Tab Take 1 tablet by mouth daily. Provider, Historical  
 
 
Allergies Allergen Reactions  Heparin Other (comments) HIT antibody test strongly positive on 2020. SHARON drawn 2020 and resulted positive on 2020  Amoxicillin Rash  Keflex [Cephalexin] Rash  Pcn [Penicillins] Other (comments) \"felt closed in\". No rash Review of Systems:  A comprehensive review of systems was negative except for that written in the History of Present Illness. Objective:  
 
Visit Vitals /58 Pulse 97 Temp (!) 100.5 °F (38.1 °C) Resp 30 Ht 5' 10\" (1.778 m) Wt 115.2 kg (253 lb 14.4 oz) SpO2 90% BMI 36.43 kg/m² Temp (24hrs), Av.5 °F (37.5 °C), Min:98.4 °F (36.9 °C), Max:100.7 °F (38.2 °C) Lines:  Peripheral IV:    
 
Physical Exam:   
General:  Alert, cooperative,moderate distress--cannot complete sentences due to SOB, appears stated age Eyes:  Sclera anicteric. Pupils equally round and reactive to light. Mouth/Throat: Mucous membranes normal, oral pharynx clear Neck: Supple Lungs:   Very diminished anteriorly CV:  Regular rate and rhythm,no murmur, click, rub or gallop Abdomen:   Soft, non-tender. bowel sounds normal. non-distended Extremities: No cyanosis. +2 generalized Skin: Skin color, texture, turgor normal. no acute rash or lesions. Sternal and LLE donor sites benign, frankie necrotic toes Lymph nodes: Cervical and supraclavicular normal  
Musculoskeletal: No swelling or deformity Lines/Devices:  Intact, no erythema, drainage or tenderness Psych: Alert and oriented, normal mood affect given the setting Data Review: CBC: 
Recent Labs 20 
0701 20 
8711 20 
1578 WBC 7.5 8.6 11.6* HGB 8.4* 8.0* 8.6* HCT 27.2* 25.9* 28.3*  
 244 280 BMP: 
Recent Labs 20 
0701 20 
7371 20 
8083 CREA 2.06* 2.20* 1.94* BUN 25* 29* 24*  139 141  
K 4.6 4.4 4.0  
 104 105 CO2 29 29 32 AGAP 6* 6* 4*  
GLU 96 87 77 LFTS: 
No results for input(s): TBILI, ALT, SGOT, AP, TP, ALB in the last 72 hours. Microbiology:  
 
All Micro Results Procedure Component Value Units Date/Time CULTURE, BLOOD [[id number]] Collected:  03/06/20 5344 Order Status:  Completed Specimen:  Blood Updated:  03/06/20 3097 CULTURE, BLOOD [[id number]] Collected:  03/06/20 0844 Order Status:  Completed Specimen:  Blood Updated:  03/06/20 6838 C. DIFFICILE AG & TOXIN A/B [[id number]] Order Status:  Canceled Specimen:  Stool CULTURE, RESPIRATORY/SPUTUM/BRONCH Noreene Dior [[id number]] Collected:  02/17/20 1220 Order Status:  Completed Specimen:  Sputum from Bronchial Washing Updated:  02/24/20 1244 Special Requests: NO SPECIAL REQUESTS     
  GRAM STAIN 20 TO 35 WBCS SEEN PER OIF  
   1 TO 2 EPITHELIAL CELLS SEEN PER OIF  
      
  MODERATE GRAM POSITIVE COCCI  
     
   FEW GRAM POSITIVE RODS Culture result:    
  LIGHT NORMAL RESPIRATORY BRENNAN Imaging:  
See hPI/EPIC Signed By: Giacomo Horta NP March 6, 2020

## 2020-03-06 NOTE — PROGRESS NOTES
PT note:  
 
Chart reviewed, spoke to RN. Nursing is requesting HOLD patient today due to respiratory status. Will check back on the patient at a later date/time as schedule allows.    
 
Sandhya Angel, PTA

## 2020-03-06 NOTE — PROGRESS NOTES
TRANSFER - OUT REPORT: 
 
Verbal report given to Mamie RN(name) on He Mancera  being transferred to [facility](unit) for urgent transfer Report consisted of patients Situation, Background, Assessment and  
Recommendations(SBAR). Information from the following report(s) SBAR was reviewed with the receiving nurse. Lines:  
Peripheral IV 03/01/20 Left Hand (Active) Site Assessment Clean, dry, & intact 3/6/2020  8:16 AM  
Phlebitis Assessment 0 3/6/2020  8:16 AM  
Infiltration Assessment 0 3/6/2020  8:16 AM  
Dressing Status Clean, dry, & intact 3/6/2020  8:16 AM  
Dressing Type Tape;Transparent 3/6/2020  8:16 AM  
Hub Color/Line Status Patent 3/6/2020  8:16 AM  
Alcohol Cap Used No 3/6/2020  6:13 AM  
  
 
Opportunity for questions and clarification was provided. Patient transported with: 
 Registered Nurse

## 2020-03-06 NOTE — PROGRESS NOTES
Patient arrives to unit with RN. Patient A/O x 4. Denies pain. Afebrile. PERRL. Purposeful in all extremities. NSR on the monitor. No ectopy. BP WDL. O2 sat 99% on 65% FiO2 BIPAP. RR 20-30's. Lung sounds diminished bilaterally. Strong non-productive cough. Abdomen soft, obese, non tender, active BS. Oliguric, brief removed due to history of wounds. #20 PIV L hand. Left SC perm cath, c/d/i. Pulses palpable in BUE. Doppler pedal pulses in BLE. Dual skin assessment performed with Sergio Childs RN. Skin appropriate for ethnicity. Bilateral feet with necrotic toes, cool to the touch. Seen by wound care. Dressings c/d/i. Xeroform, gauze, gauze wrap. Mid/sacrum with open wound. [place] tissue at base. Surrounding tissues non-blanchable. Packed with gauze. Allevyn replaced. Patient's son at bedside confirms patient's wishes to be DNR/DNI.

## 2020-03-07 NOTE — PROGRESS NOTES
Premeal humalog  and QHS Lantus held for now. Patient is NPO. Monitor BS and titrate dose as needed.

## 2020-03-07 NOTE — INTERVAL H&P NOTE
H&P Update: 
Beth Christianson was seen and examined. History and physical has been reviewed. The patient has been examined.  There have been no significant clinical changes since the completion of the originally dated History and Physical.

## 2020-03-07 NOTE — PROGRESS NOTES
Bedside and Verbal shift change report given to [address] [place] [address] (oncoming nurse) by Ioana Blancas (offgoing nurse). Report included the following information SBAR, Kardex, ED Summary, Procedure Summary, Intake/Output, MAR and Recent Results.

## 2020-03-07 NOTE — PROGRESS NOTES
Speech therapy note Attempted to see pt this am, however, pt on BIPAP.  Mehrdad Nash MA/[place]/SLP

## 2020-03-07 NOTE — PROGRESS NOTES
Esha Almeida Admission Date: 2/13/2020 Daily Progress Note: 3/7/2020 
 
  
 73 yo CM with a history of CAD s/p CABG with AVR 1/10/20 by Dr. Kevin Douglass complicated by CKD requiring HD, HIT +, DVTs, wound left thigh and pneumonia. Was discharged on Levaquin Q 48 hours last dose 2/11/20. [facility] was discharged on NC 2 L but has been increased to NC 4L O2 at STR. He presented to the ER on 2/15 with increased SOB. CTA chest was negative for PE but persistent LLL opacity. He had a bronch 2/17 with negative cultures and was treated for PNA using levaquin. He is now ESRD and being followed by nephrology. We were reconsulted on 3/6 for acute respiratory failure requiring bipap. CXR shows increased consolidation in the left base. He is febrile with a temp of 100. 5. he has refused a swallow evaluation. ID is following and he was started on Vanc and Cefepime on 3/6. Subjective:  
 
Moved to ICU yesterday for BIPAP increased SOB. ID started antibiotics with infiltrate and fever for pneumonia. Current Facility-Administered Medications Medication Dose Route Frequency  sodium chloride (OCEAN) 0.65 % nasal squeeze bottle 2 Spray  2 Spray Both Nostrils Q2H PRN  
 cefepime (MAXIPIME) 2 g in 0.9% sodium chloride (MBP/ADV) 100 mL  2 g IntraVENous Q12H  
 vancomycin (VANCOCIN) 1500 mg in  ml infusion  1,500 mg IntraVENous Q24H  
 albuterol-ipratropium (DUO-NEB) 2.5 MG-0.5 MG/3 ML  3 mL Nebulization Q6H RT  
 sodium chloride 3% hypertonic nebulizer soln  4 mL Nebulization BID RT  
 warfarin (COUMADIN) tablet 6 mg  6 mg Oral QPM  
 sodium chloride (OCEAN) 0.65 % nasal squeeze bottle 2 Spray  2 Spray Both Nostrils BID  [Held by provider] insulin lispro (HUMALOG) injection 8 Units  8 Units SubCUTAneous TIDAC  ferric gluconate (FERRLECIT) 125 mg in 0.9% sodium chloride 100 mL IVPB  125 mg IntraVENous DAILY  epoetin maritza-epbx (RETACRIT) 14,000 Units combo injection  14,000 Units SubCUTAneous Q7D  
 [Held by provider] insulin glargine (LANTUS) injection 45 Units  45 Units SubCUTAneous QHS  insulin lispro (HUMALOG) injection   SubCUTAneous AC&HS  polyethylene glycol (MIRALAX) packet 17 g  17 g Oral DAILY  clonazePAM (KlonoPIN) tablet 0.5 mg  0.5 mg Oral QHS PRN  
 traZODone (DESYREL) tablet 100 mg  100 mg Oral QHS  midodrine (PROAMITINE) tablet 10 mg  10 mg Oral TID WITH MEALS  tamsulosin (FLOMAX) capsule 0.4 mg  0.4 mg Oral QHS  busPIRone (BUSPAR) tablet 10 mg  10 mg Oral TID  ALPRAZolam (XANAX) tablet 0.5 mg  0.5 mg Oral QID PRN  
 sodium chloride (NS) flush 5-40 mL  5-40 mL IntraVENous Q8H  
 loperamide (IMODIUM) capsule 4 mg  4 mg Oral QID PRN  pantothenic ac-min oil-pet,hyd (AQUAPHOR) 41 % ointment   Topical DAILY  traMADol (ULTRAM) tablet 50 mg  50 mg Oral Q6H PRN  
 sodium chloride (NS) flush 5-40 mL  5-40 mL IntraVENous Q8H  
 sodium chloride (NS) flush 5-40 mL  5-40 mL IntraVENous PRN  
 acetaminophen (TYLENOL) tablet 650 mg  650 mg Oral Q4H PRN  
 naloxone (NARCAN) injection 0.4 mg  0.4 mg IntraVENous PRN  
 ondansetron (ZOFRAN) injection 4 mg  4 mg IntraVENous Q4H PRN  
 bisacodyL (DULCOLAX) tablet 5 mg  5 mg Oral DAILY PRN Objective:  
 
Vitals:  
 03/07/20 2606 03/07/20 8721 03/07/20 1052 03/07/20 7393 BP: 129/61 132/61 Pulse: 75 79 Resp: 26 22 Temp:  98.4 °F (36.9 °C) SpO2: 96% 95% 94% 93% Weight:      
Height:      
 
Intake and Output:  
03/05 1901 - 03/07 0700 In: 710 [I.V.:710] Out: 400 [Urine:400] No intake/output data recorded. Physical Exam:  
Constitutional:  the patient is well developed and in no acute distress HEENT:  Sclera clear, pupils equal, oral mucosa moist 
Lungs: crackles bilaterally - decreased on the left. Wearing bipap 15/8 with TV of 550 mls, rate 12, 65% Cardiovascular:  RRR without M,G,R 
 Abd/GI: soft and non-tender; with positive bowel sounds. Ext: warm without cyanosis. There is no lower leg edema. Musculoskeletal: moves all four extremities with equal strength Skin:  no jaundice or rashes,  Multiple wounds - sacrum, ischemic toes, thigh Neuro: no gross neuro deficits. Awake and interactive Musculoskeletal: ? can ambulate - in bed at present, not witnessed. No deformity Psychiatric: Calm. Review of Systems - General ROS: positive for  - fever Respiratory ROS: positive for - cough and shortness of breath Cardiovascular ROS: positive for - none Gastrointestinal ROS: positive for - diarrhea Musculoskeletal ROS: positive for - muscular weakness Neurological ROS: positive for - none Lines: dialysis catheter CHEST XRAY: 3/7: Left sided infiltrate/effusion worse 3/6 LAB Recent Labs 03/07/20 
5231 03/06/20 
0701 03/05/20 
3433 WBC 7.6 7.5 8.6 HGB 8.0* 8.4* 8.0*  
HCT 25.9* 27.2* 25.9*  
 227 244 INR 2.6 3.1 2.9 Recent Labs 03/07/20 
7678 03/06/20 
0701 03/05/20 
5678  138 139  
K 5.1 4.6 4.4  
 103 104 CO2 28 29 29 * 96 87 BUN 25* 25* 29* CREA 2.39* 2.06* 2.20* MG 1.9  --   --   
 
Assessment:  (Medical Decision Making) Hospital Problems  Date Reviewed: 2/24/2020 Codes Class Noted POA  
 DNR (do not resuscitate) (Chronic) ICD-10-CM: Y71 ICD-9-CM: V49.86  2/26/2020 Yes Acute renal failure on dialysis [facility]) ICD-10-CM: N17.9, Z99.2 ICD-9-CM: 584.9, V45.11  2/25/2020 Yes Hypotension (Chronic) ICD-10-CM: I95.9 ICD-9-CM: 458.9  2/18/2020 Yes HIT (heparin-induced thrombocytopenia) (HCC) (Chronic) ICD-10-CM: Q51.07 ICD-9-CM: 289.84  1/23/2020 Yes Atrial fibrillation (HCC) (Chronic) ICD-10-CM: I48.91 
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 
ICD-9-CM: 250.40, 583.81  8/26/2019 Yes Obesity, morbid ([address] (Chronic) ICD-10-CM: E66.01 
ICD-9-CM: 278.01  10/19/2018 Yes DM type 2 (diabetes mellitus, type 2) (HCC) (Chronic) ICD-10-CM: E11.9 ICD-9-CM: 250.00  1/14/2013 Yes HLD (hyperlipidemia) (Chronic) ICD-10-CM: C37.0 ICD-9-CM: 272.4  1/14/2013 Yes Plan:  (Medical Decision Making) 1. Continue BIPAP as needed, trial off to HFNC as able 2. CXR with increased consolidation left base. ID has restarted abx with new fever - now on cefepime and vanc. Blood and sputum cultures pending. Had bronch on 2/17 with negative cultures 3. ST to evaluate swallow when able to be off BIPAP 4. Continue 3% saline, nebs - rebronch if needed. Follow up CXR tomorrow, monitor oxygen needs 5. Confirmed DNR status with patient and son at bedside 6. Dialysis per nephrology - blood pressure better. On midodrine 7. Coumadin with history of HIT - INR 2.6 8. Did bedside U/S to see if significant effusion on left, but only very small. 9. Will continue antibiotics and see CXR tomorrow after BIPAP, HD and abx and could consider bronchoscopy tomorrow for ?mucus plugging DNR Rosy Naranjo MD 
 
More than 50% of time documented was spent face-to-face contact with the patient and in the care of the patient on the floor/unit where the patient is located.

## 2020-03-07 NOTE — DIALYSIS
Hemodialysis treatment completed without complications. Patient w/o change and VS stable  /59  P 80   
 
 2.3 Kgs removed. Flushed both ports with 10 mL of NS.  CVC dressing clean, dry, and intact, tego caps intact, bilateral lumens wrapped with 4x4 gauze. Patient remains in room

## 2020-03-07 NOTE — DIALYSIS
Consent verified for renal replacement therapy. HD initiated using LTPC. Machine settings per MD order. Pt on bipap sleeping. Will monitor during treatment.  
 
/55 P 75 2K BATH

## 2020-03-07 NOTE — PROGRESS NOTES
Short run of Vtach reported. Will check his electrolytes now. If he keeps having episodes of NSVtach will order a low dose of BB.

## 2020-03-07 NOTE — PROGRESS NOTES
Renal Progress Note Admission Date: 2/13/2020 Subjective:  
  
Moved to ICU with shortness of breath on bipap Creatinine is up Urine volume is marginal  
 
Objective:  
 
Physical Exam:   
Patient Vitals for the past 8 hrs: 
 BP Temp Pulse Resp SpO2 Weight 03/07/20 0804     93 %   
03/07/20 0738     94 %   
03/07/20 0717 132/61 98.4 °F (36.9 °C) 79 22 95 %   
03/07/20 0646 129/61  75 26 96 %   
03/07/20 0617      117 kg (257 lb 15 oz) 03/07/20 0616 106/51  77 22 94 %   
03/07/20 0600 136/65  77 25 94 %   
03/07/20 0546 126/58  75 26 95 %   
03/07/20 0531 131/60  76 21 96 %   
03/07/20 0516 122/59  77 22 96 %   
03/07/20 0500 123/63  78 29 96 %   
03/07/20 0446 117/58  77 23 94 %   
03/07/20 0431 120/58  79 27 94 %   
03/07/20 0404   83 21    
03/07/20 0401 123/60 100.4 °F (38 °C)   94 %   
03/07/20 0330 108/57  79 26 95 %   
03/07/20 0301 99/51  80  94 %   
03/07/20 0249     93 %   
03/07/20 0230 107/55  80 20 94 %   
03/07/20 0216 95/52    95 %   
03/07/20 0210   85 27    
03/07/20 0201 107/53    97 %   
03/07/20 0159   79 28    
03/07/20 0145 96/54  82 24 95 %   
03/07/20 0130 90/53  81 25 93 %   
03/07/20 0116 97/52    92 %   
03/07/20 0115   85 15   Gen: comfortable , NAD 
CV: S1, S2 
Lungs: Coarse bilaterally, with some crackles Extem: trace+ edema Current Facility-Administered Medications Medication Dose Route Frequency  sodium chloride (OCEAN) 0.65 % nasal squeeze bottle 2 Spray  2 Spray Both Nostrils Q2H PRN  
 cefepime (MAXIPIME) 2 g in 0.9% sodium chloride (MBP/ADV) 100 mL  2 g IntraVENous Q12H  
 vancomycin (VANCOCIN) 1500 mg in  ml infusion  1,500 mg IntraVENous Q24H  
 albuterol-ipratropium (DUO-NEB) 2.5 MG-0.5 MG/3 ML  3 mL Nebulization Q6H RT  
 sodium chloride 3% hypertonic nebulizer soln  4 mL Nebulization BID RT  
 warfarin (COUMADIN) tablet 6 mg  6 mg Oral QPM  
  sodium chloride (OCEAN) 0.65 % nasal squeeze bottle 2 Spray  2 Spray Both Nostrils BID  [Held by provider] insulin lispro (HUMALOG) injection 8 Units  8 Units SubCUTAneous TIDAC  ferric gluconate (FERRLECIT) 125 mg in 0.9% sodium chloride 100 mL IVPB  125 mg IntraVENous DAILY  epoetin maritza-epbx (RETACRIT) 14,000 Units combo injection  14,000 Units SubCUTAneous Q7D  
 [Held by provider] insulin glargine (LANTUS) injection 45 Units  45 Units SubCUTAneous QHS  insulin lispro (HUMALOG) injection   SubCUTAneous AC&HS  polyethylene glycol (MIRALAX) packet 17 g  17 g Oral DAILY  clonazePAM (KlonoPIN) tablet 0.5 mg  0.5 mg Oral QHS PRN  
 traZODone (DESYREL) tablet 100 mg  100 mg Oral QHS  midodrine (PROAMITINE) tablet 10 mg  10 mg Oral TID WITH MEALS  tamsulosin (FLOMAX) capsule 0.4 mg  0.4 mg Oral QHS  busPIRone (BUSPAR) tablet 10 mg  10 mg Oral TID  ALPRAZolam (XANAX) tablet 0.5 mg  0.5 mg Oral QID PRN  
 sodium chloride (NS) flush 5-40 mL  5-40 mL IntraVENous Q8H  
 loperamide (IMODIUM) capsule 4 mg  4 mg Oral QID PRN  pantothenic ac-min oil-pet,hyd (AQUAPHOR) 41 % ointment   Topical DAILY  traMADol (ULTRAM) tablet 50 mg  50 mg Oral Q6H PRN  
 sodium chloride (NS) flush 5-40 mL  5-40 mL IntraVENous Q8H  
 sodium chloride (NS) flush 5-40 mL  5-40 mL IntraVENous PRN  
 acetaminophen (TYLENOL) tablet 650 mg  650 mg Oral Q4H PRN  
 naloxone (NARCAN) injection 0.4 mg  0.4 mg IntraVENous PRN  
 ondansetron (ZOFRAN) injection 4 mg  4 mg IntraVENous Q4H PRN  
 bisacodyL (DULCOLAX) tablet 5 mg  5 mg Oral DAILY PRN Data Review:  
 
LABS:  
Recent Results (from the past 12 hour(s)) GLUCOSE, POC Collection Time: 03/06/20  9:42 PM  
Result Value Ref Range Glucose (POC) 121 (H) 65 - 100 mg/dL CBC WITH AUTOMATED DIFF Collection Time: 03/07/20 12:48 AM  
Result Value Ref Range WBC 7.6 4.3 - 11.1 K/uL  
 RBC 2.74 (L) 4.23 - 5.6 M/uL HGB 8.0 (L) 13.6 - 17.2 g/dL HCT 25.9 (L) 41.1 - 50.3 % MCV 94.5 79.6 - 97.8 FL  
 MCH 29.2 26.1 - 32.9 PG  
 MCHC 30.9 (L) 31.4 - 35.0 g/dL  
 RDW 15.4 (H) 11.9 - 14.6 % PLATELET 212 229 - 701 K/uL MPV 9.3 (L) 9.4 - 12.3 FL ABSOLUTE NRBC 0.00 0.0 - 0.2 K/uL  
 DF AUTOMATED NEUTROPHILS 56 43 - 78 % LYMPHOCYTES 32 13 - 44 % MONOCYTES 9 4.0 - 12.0 % EOSINOPHILS 2 0.5 - 7.8 % BASOPHILS 1 0.0 - 2.0 % IMMATURE GRANULOCYTES 0 0.0 - 5.0 %  
 ABS. NEUTROPHILS 4.3 1.7 - 8.2 K/UL  
 ABS. LYMPHOCYTES 2.4 0.5 - 4.6 K/UL  
 ABS. MONOCYTES 0.7 0.1 - 1.3 K/UL  
 ABS. EOSINOPHILS 0.1 0.0 - 0.8 K/UL  
 ABS. BASOPHILS 0.1 0.0 - 0.2 K/UL  
 ABS. IMM. GRANS. 0.0 0.0 - 0.5 K/UL MAGNESIUM Collection Time: 03/07/20 12:48 AM  
Result Value Ref Range Magnesium 1.9 1.8 - 2.4 mg/dL METABOLIC PANEL, BASIC Collection Time: 03/07/20 12:48 AM  
Result Value Ref Range Sodium 138 136 - 145 mmol/L Potassium 5.1 3.5 - 5.1 mmol/L Chloride 105 98 - 107 mmol/L  
 CO2 28 21 - 32 mmol/L Anion gap 5 (L) 7 - 16 mmol/L Glucose 107 (H) 65 - 100 mg/dL BUN 25 (H) 8 - 23 MG/DL Creatinine 2.39 (H) 0.8 - 1.5 MG/DL  
 GFR est AA 34 (L) >60 ml/min/1.73m2 GFR est non-AA 28 (L) >60 ml/min/1.73m2 Calcium 8.1 (L) 8.3 - 10.4 MG/DL PROTHROMBIN TIME + INR Collection Time: 03/07/20 12:48 AM  
Result Value Ref Range Prothrombin time 28.9 (H) 12.0 - 14.7 sec INR 2.6 GLUCOSE, POC Collection Time: 03/07/20  7:19 AM  
Result Value Ref Range Glucose (POC) 82 65 - 100 mg/dL Plan: Active Problems: 
  DM type 2 (diabetes mellitus, type 2) ([facility] (1/14/2013) HLD (hyperlipidemia) (1/14/2013) Obesity, morbid ([facility] (10/19/2018) Type 2 diabetes with nephropathy ([facility] (8/26/2019) CAD (coronary artery disease) (1/10/2020) S/P CABG x 3 (1/10/2020) S/P AVR (1/10/2020) Atrial fibrillation ([facility] (1/13/2020) HIT (heparin-induced thrombocytopenia) ([facility] (1/23/2020) Hypotension (2/18/2020) Acute renal failure on dialysis ([facility] (2/25/2020) DNR (do not resuscitate) (2/26/2020) DEJUAN/CKD 
- ([facility] schedule) 
-last HD 3/5 
-creatinine continued to trend down but today is up and urine volume is marginal.  
CXR with with sign of volume - will plan on dialysis today in ICU 
  
HIT/SHARON + 
  
ASHD s/p CABG 
  
Orthostatic Hypotension - on midodrine 
  
 Anemia - on IV Fe replacement and [place] Addendum: 
The patient was seen on dialysis at 10:53 AM .  BP is stable. Catheter is functioning well. Targeting 2-3 kg. BP is stable for now

## 2020-03-07 NOTE — PROGRESS NOTES
Pt remains anxious and SOB after xanax prn dose. Dr Mellissa Parker updated and orders received for 2 mg morphine iv q6prn for dyspnea

## 2020-03-07 NOTE — PROGRESS NOTES
Hospitalist Note Admit Date:  2020  3:03 PM  
Name:  Ovidio Welsh Age:  69 y.o. 
:  1945 MRN:  [id number] PCP:  Iris Rodriguez MD 
Treatment Team: Attending Provider: Leisa Allan MD; Consulting Provider: Andreina Betancourt MD; Utilization Review: Matthias Ledbetter RN; Hospitalist: Susu Torres NP; Hospitalist: Tia Oates NP; Consulting Provider: Deborah Yin MD; Consulting Provider: Tahir Everett MD; Care Manager: James Little.; Utilization Review: Radha Hill; Consulting Provider: Lisbeth Tello MD; Consulting Provider: Jayde Nash MD; Consulting Provider: Susana Wooten MD 
 
HPI/Subjective:  
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 CXR showed interstitial edema/ left pleural effusion. CTA chest no PE. He completed a course of levaquin 20. He was seen by pulmonary s/p bronch 20. He had some hypotension started on midodrine. He had worsening depression, seen by tele psyche for suicidal ideation with sitter that resolved. Discharge plans pending due to recurrent insurance denials. Pt transferred to ICU on 3/6 for BIPAP in view of worsening respiratory distress in view CXR with increased consolidation in left base. Pt spiking intermittent fever. There is also a concern for silent aspiration. ID on board, continuing IV vanc and cefepime. Blood and sputum culture negative so far. Speech therapy on board. 3/7 Pt is on BIPAP this AM as well. He reports \"about feeling the same\". He denies any pain, nausea/vomiting, diarrhea. Spiked fever again last night 101.2 ROS: 
10 point ROS negative except what mentioned above. Objective:  
 
Patient Vitals for the past 24 hrs: 
 Temp Pulse Resp BP SpO2 03/07/20 0738     94 % 03/07/20 0717 98.4 °F (36.9 °C) 79 22 132/61 95 % 03/07/20 0646  75 26 129/61 96 % 03/07/20 0616  77 22 106/51 94 % 03/07/20 0600  77 25 136/65 94 % 03/07/20 0546  75 26 126/58 95 % 03/07/20 0531  76 21 131/60 96 % 03/07/20 0516  77 22 122/59 96 % 03/07/20 0500  78 29 123/63 96 % 03/07/20 0446  77 23 117/58 94 % 03/07/20 0431  79 27 120/58 94 % 03/07/20 0404  83 21    
03/07/20 0401 100.4 °F (38 °C)   123/60 94 % 03/07/20 0330  79 26 108/57 95 % 03/07/20 0301  80  99/51 94 % 03/07/20 0249     93 % 03/07/20 0230  80 20 107/55 94 % 03/07/20 0216    95/52 95 % 03/07/20 0210  85 27    
03/07/20 0201    107/53 97 % 03/07/20 0159  79 28    
03/07/20 0145  82 24 96/54 95 % 03/07/20 0130  81 25 90/53 93 % 03/07/20 0116    97/52 92 % 03/07/20 0115  85 13    
03/07/20 0102  87 14    
03/07/20 0101    96/51 (!) 89 % 03/07/20 0030  90 26 101/55 92 % 03/07/20 0023  88 22    
03/07/20 0021    120/63 95 % 03/07/20 0001  85 28 115/59 93 % 03/06/20 2345    111/55 92 % 03/06/20 2344  85 27    
03/06/20 2330  85 27 124/60 92 % 03/06/20 2322     93 % 03/06/20 2316  87 28 132/58 95 % 03/06/20 2301 (!) 101.2 °F (38.4 °C) 90 29 136/60 95 % 03/06/20 2245  88 27 129/58 93 % 03/06/20 2230  83 22 126/58 94 % 03/06/20 2216  88 27 128/60 94 % 03/06/20 2201    123/59 95 % 03/06/20 2159  85 29    
03/06/20 2145    119/55 93 % 03/06/20 2143  84 27    
03/06/20 2130  86 23 124/60 94 % 03/06/20 2116  86 21 120/58 94 % 03/06/20 2102  82 22 140/63 97 % 03/06/20 2045  83 20 122/57 97 % 03/06/20 2031  83 17 136/63 97 % 03/06/20 2016  88 27 119/51 93 % 03/06/20 2003  83 24    
03/06/20 2001    128/60 97 % 03/06/20 1945    128/60 94 % 03/06/20 1944  80 28    
03/06/20 1930  81 29 131/57 94 % 03/06/20 1919     97 % 03/06/20 1916  82 24 123/59 97 % 03/06/20 1902  93 26    
03/06/20 1901 99 °F (37.2 °C) 80 25 127/59 98 % 03/06/20 1845  84 26 141/64 98 % 03/06/20 1830  83 24 148/67 97 % 03/06/20 1827  85 27  97 % 03/06/20 1814  91 22  98 % 03/06/20 1748  85 25  99 % 03/06/20 1745  82 24 159/81 99 % 03/06/20 1731  81 20 146/66 98 % 03/06/20 1724     99 % 03/06/20 1716    127/60 98 % 03/06/20 1715  80 25  98 % 03/06/20 1706  82 23  96 % 03/06/20 1701    127/58 99 % 03/06/20 1630    124/58 95 % 03/06/20 1600 99.8 °F (37.7 °C) 98 21 120/58 95 % 03/06/20 1501     94 % 03/06/20 1325     92 % 03/06/20 1130    120/58   
03/06/20 1128 (!) 100.5 °F (38.1 °C) 97 30 120/58 90 % 03/06/20 0921     95 % 03/06/20 0824     95 % Oxygen Therapy O2 Sat (%): 94 % (03/07/20 0738) Pulse via Oximetry: 87 beats per minute (03/07/20 0738) O2 Device: Nasal cannula (03/07/20 0738) O2 Flow Rate (L/min): 4 l/min (03/07/20 0738) O2 Temperature: 87.8 °F (31 °C) (03/06/20 0921) FIO2 (%): 55 % (03/07/20 0249) Estimated body mass index is 37.01 kg/m² as calculated from the following: 
  Height as of this encounter: 5' 10\" (1.778 m). Weight as of this encounter: 117 kg (257 lb 15 oz). Intake/Output Summary (Last 24 hours) at 3/7/2020 0745 Last data filed at 3/7/2020 0000 Gross per 24 hour Intake 710 ml Output 200 ml Net 510 ml  
   
*Note that automatically entered I/Os may not be accurate; dependent on patient compliance with collection and accurate  by techs. General:    Elderly male, obese, on BIPAP 
CV:   RRR. No murmur, rub, or gallop. Lungs:   CTAB. No wheezing, rhonchi, or rales. Abdomen:   Soft, nontender, nondistended. Extremities: Warm and dry. Trace edema bilaterally LE. Gangrenous left big and 2nd/3rd toes Skin:     No rashes or jaundice. Neuro:  No gross focal deficits Data Review: I have reviewed all labs, meds, and studies from the last 24 hours: 
 
Recent Results (from the past 24 hour(s)) CULTURE, BLOOD Collection Time: 03/06/20  8:39 AM  
Result Value Ref Range Special Requests: LEFT Antecubital 
    
 Culture result: NO GROWTH AFTER 20 HOURS    
CULTURE, BLOOD Collection Time: 03/06/20  8:44 AM  
Result Value Ref Range Special Requests: RIGHT Antecubital 
    
 Culture result: NO GROWTH AFTER 20 HOURS    
GLUCOSE, POC Collection Time: 03/06/20 11:14 AM  
Result Value Ref Range Glucose (POC) 121 (H) 65 - 100 mg/dL GLUCOSE, POC Collection Time: 03/06/20  5:13 PM  
Result Value Ref Range Glucose (POC) 146 (H) 65 - 100 mg/dL PLEASE READ & DOCUMENT PPD TEST IN 72 HRS Collection Time: 03/06/20  5:17 PM  
Result Value Ref Range PPD Negative Negative  
 mm Induration 0 0 - 5 mm GLUCOSE, POC Collection Time: 03/06/20  9:42 PM  
Result Value Ref Range Glucose (POC) 121 (H) 65 - 100 mg/dL CBC WITH AUTOMATED DIFF Collection Time: 03/07/20 12:48 AM  
Result Value Ref Range WBC 7.6 4.3 - 11.1 K/uL  
 RBC 2.74 (L) 4.23 - 5.6 M/uL HGB 8.0 (L) 13.6 - 17.2 g/dL HCT 25.9 (L) 41.1 - 50.3 % MCV 94.5 79.6 - 97.8 FL  
 MCH 29.2 26.1 - 32.9 PG  
 MCHC 30.9 (L) 31.4 - 35.0 g/dL  
 RDW 15.4 (H) 11.9 - 14.6 % PLATELET 981 011 - 906 K/uL MPV 9.3 (L) 9.4 - 12.3 FL ABSOLUTE NRBC 0.00 0.0 - 0.2 K/uL  
 DF AUTOMATED NEUTROPHILS 56 43 - 78 % LYMPHOCYTES 32 13 - 44 % MONOCYTES 9 4.0 - 12.0 % EOSINOPHILS 2 0.5 - 7.8 % BASOPHILS 1 0.0 - 2.0 % IMMATURE GRANULOCYTES 0 0.0 - 5.0 %  
 ABS. NEUTROPHILS 4.3 1.7 - 8.2 K/UL  
 ABS. LYMPHOCYTES 2.4 0.5 - 4.6 K/UL  
 ABS. MONOCYTES 0.7 0.1 - 1.3 K/UL  
 ABS. EOSINOPHILS 0.1 0.0 - 0.8 K/UL  
 ABS. BASOPHILS 0.1 0.0 - 0.2 K/UL  
 ABS. IMM. GRANS. 0.0 0.0 - 0.5 K/UL MAGNESIUM Collection Time: 03/07/20 12:48 AM  
Result Value Ref Range Magnesium 1.9 1.8 - 2.4 mg/dL METABOLIC PANEL, BASIC Collection Time: 03/07/20 12:48 AM  
Result Value Ref Range Sodium 138 136 - 145 mmol/L Potassium 5.1 3.5 - 5.1 mmol/L Chloride 105 98 - 107 mmol/L  
 CO2 28 21 - 32 mmol/L Anion gap 5 (L) 7 - 16 mmol/L Glucose 107 (H) 65 - 100 mg/dL BUN 25 (H) 8 - 23 MG/DL Creatinine 2.39 (H) 0.8 - 1.5 MG/DL  
 GFR est AA 34 (L) >60 ml/min/1.73m2 GFR est non-AA 28 (L) >60 ml/min/1.73m2 Calcium 8.1 (L) 8.3 - 10.4 MG/DL PROTHROMBIN TIME + INR Collection Time: 03/07/20 12:48 AM  
Result Value Ref Range Prothrombin time 28.9 (H) 12.0 - 14.7 sec INR 2.6 GLUCOSE, POC Collection Time: 03/07/20  7:19 AM  
Result Value Ref Range Glucose (POC) 82 65 - 100 mg/dL All Micro Results Procedure Component Value Units Date/Time CULTURE, BLOOD [[id number]] Collected:  03/06/20 3413 Order Status:  Completed Specimen:  Blood Updated:  03/07/20 8470 Special Requests: --     
  LEFT Antecubital 
  
  Culture result: NO GROWTH AFTER 20 HOURS     
 CULTURE, BLOOD [[id number]] Collected:  03/06/20 0844 Order Status:  Completed Specimen:  Blood Updated:  03/07/20 8011 Special Requests: --     
  RIGHT Antecubital 
  
  Culture result: NO GROWTH AFTER 20 HOURS     
 CULTURE, RESPIRATORY/SPUTUM/BRONCH Noelle Jock STAIN [[id number]] Order Status:  Sent Specimen:  Sputum C. DIFFICILE AG & TOXIN A/B [[id number]] Order Status:  Canceled Specimen:  Stool CULTURE, RESPIRATORY/SPUTUM/BRONCH [place] Nipper [[id number]] Collected:  02/17/20 1220 Order Status:  Completed Specimen:  Sputum from Bronchial Washing Updated:  02/24/20 1244 Special Requests: NO SPECIAL REQUESTS     
  GRAM STAIN 20 TO 35 WBCS SEEN PER OIF  
   1 TO 2 EPITHELIAL CELLS SEEN PER OIF  
      
  MODERATE GRAM POSITIVE COCCI  
     
   FEW GRAM POSITIVE RODS Culture result:    
  LIGHT NORMAL RESPIRATORY BRENNAN No results found for this visit on 02/13/20. Current Meds: 
Current Facility-Administered Medications Medication Dose Route Frequency  sodium chloride (OCEAN) 0.65 % nasal squeeze bottle 2 Spray  2 Spray Both Nostrils Q2H PRN  
 cefepime (MAXIPIME) 2 g in 0.9% sodium chloride (MBP/ADV) 100 mL  2 g IntraVENous Q12H  
 vancomycin (VANCOCIN) 1500 mg in  ml infusion  1,500 mg IntraVENous Q24H  
 albuterol-ipratropium (DUO-NEB) 2.5 MG-0.5 MG/3 ML  3 mL Nebulization Q6H RT  
 sodium chloride 3% hypertonic nebulizer soln  4 mL Nebulization BID RT  
 warfarin (COUMADIN) tablet 6 mg  6 mg Oral QPM  
 sodium chloride (OCEAN) 0.65 % nasal squeeze bottle 2 Spray  2 Spray Both Nostrils BID  [Held by provider] insulin lispro (HUMALOG) injection 8 Units  8 Units SubCUTAneous TIDAC  ferric gluconate (FERRLECIT) 125 mg in 0.9% sodium chloride 100 mL IVPB  125 mg IntraVENous DAILY  epoetin maritza-epbx (RETACRIT) 14,000 Units combo injection  14,000 Units SubCUTAneous Q7D  
 [Held by provider] insulin glargine (LANTUS) injection 45 Units  45 Units SubCUTAneous QHS  insulin lispro (HUMALOG) injection   SubCUTAneous AC&HS  polyethylene glycol (MIRALAX) packet 17 g  17 g Oral DAILY  clonazePAM (KlonoPIN) tablet 0.5 mg  0.5 mg Oral QHS PRN  
 traZODone (DESYREL) tablet 100 mg  100 mg Oral QHS  midodrine (PROAMITINE) tablet 10 mg  10 mg Oral TID WITH MEALS  tamsulosin (FLOMAX) capsule 0.4 mg  0.4 mg Oral QHS  busPIRone (BUSPAR) tablet 10 mg  10 mg Oral TID  ALPRAZolam (XANAX) tablet 0.5 mg  0.5 mg Oral QID PRN  
 sodium chloride (NS) flush 5-40 mL  5-40 mL IntraVENous Q8H  
 loperamide (IMODIUM) capsule 4 mg  4 mg Oral QID PRN  pantothenic ac-min oil-pet,hyd (AQUAPHOR) 41 % ointment   Topical DAILY  traMADol (ULTRAM) tablet 50 mg  50 mg Oral Q6H PRN  
 sodium chloride (NS) flush 5-40 mL  5-40 mL IntraVENous Q8H  
 sodium chloride (NS) flush 5-40 mL  5-40 mL IntraVENous PRN  
  acetaminophen (TYLENOL) tablet 650 mg  650 mg Oral Q4H PRN  
 naloxone (NARCAN) injection 0.4 mg  0.4 mg IntraVENous PRN  
 ondansetron (ZOFRAN) injection 4 mg  4 mg IntraVENous Q4H PRN  
 bisacodyL (DULCOLAX) tablet 5 mg  5 mg Oral DAILY PRN Other Studies (last 24 hours): Xr Chest Sngl V Result Date: 3/7/2020 EXAM: Chest x-ray. INDICATION: Dyspnea. COMPARISON: March 5, 2020. TECHNIQUE: Frontal view chest x-ray. FINDINGS: There is progressed diffuse left lung edema or infiltrate and moderate pleural effusion. The right lung and pleural space are clear. No pneumothorax is identified. Again noted is cardiomegaly, a prior sternotomy, a prosthetic heart valve, a right chest wall pacemaker and a left-sided dialysis catheter. IMPRESSION: Progressed left lung edema or infiltrate and moderate pleural effusion. Assessment and Plan:  
 
Hospital Problems as of 3/7/2020 Date Reviewed: 2/24/2020 Codes Class Noted - Resolved POA  
 DNR (do not resuscitate) (Chronic) ICD-10-CM: Z68 ICD-9-CM: V49.86  2/26/2020 - Present Yes Acute renal failure on dialysis [facility]) ICD-10-CM: N17.9, Z99.2 ICD-9-CM: 584.9, V45.11  2/25/2020 - Present Yes Hypotension (Chronic) ICD-10-CM: I95.9 ICD-9-CM: 458.9  2/18/2020 - Present Yes HIT (heparin-induced thrombocytopenia) (HCC) (Chronic) ICD-10-CM: H44.48 ICD-9-CM: 289.84  1/23/2020 - Present Yes Atrial fibrillation (HCC) (Chronic) ICD-10-CM: I48.91 
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 
ICD-9-CM: 250.40, 583.81  8/26/2019 - Present Yes Obesity, morbid ([facility] (Chronic) ICD-10-CM: E66.01 
ICD-9-CM: 278.01  10/19/2018 - Present Yes DM type 2 (diabetes mellitus, type 2) (HCC) (Chronic) ICD-10-CM: E11.9 ICD-9-CM: 250.00  1/14/2013 - Present Yes HLD (hyperlipidemia) (Chronic) ICD-10-CM: O03.0 ICD-9-CM: 272.4  1/14/2013 - Present Yes RESOLVED: Acute-on-chronic kidney injury ([facility] ICD-10-CM: N17.9, N18.9 ICD-9-CM: 584.9, 585.9  2/17/2020 - 2/25/2020 Yes RESOLVED: Atelectasis ICD-10-CM: J98.11 ICD-9-CM: 518.0  2/17/2020 - 2/25/2020 Yes RESOLVED: Fluid overload ICD-10-CM: E87.70 ICD-9-CM: 276.69  2/15/2020 - 2/25/2020 Yes RESOLVED: HCAP (healthcare-associated pneumonia) ICD-10-CM: J18.9 ICD-9-CM: 641  2/14/2020 - 2/25/2020 Yes RESOLVED: Pleural effusion ICD-10-CM: J90 ICD-9-CM: 511.9  2/3/2020 - 2/25/2020 Yes * (Principal) RESOLVED: Acute hypoxemic respiratory failure ([place] [id number].) ICD-10-CM: J96.01 
ICD-9-CM: 518.81  1/10/2020 - 2/25/2020 Yes RESOLVED: HTN (hypertension) ICD-10-CM: I10 
ICD-9-CM: 401.9  1/14/2013 - 2/25/2020 Yes Plan: 
 
Acute respiratory failure/pneumonia CXR: Worsening left-sided consolidation Still on BIPAP this AM, wean off as able Continue IV cefepime and vancomycin ESRD on HD Dialysis on hold as urine output is good. As per nephro will continue to hold and monitor renal function and urine output Orthostatic hypotension Plan: 
-Orthostatic BP 2 times daily 
-If patient continues to have orthostatic hypotension will initiate fludrocortisone 0.1 mg daily 
-Continue Midodrin 10 mg 3 times daily Diabetes mellitus 
-Hold home medications 
-pt hypoglycemic this AM 
lantus 45 units on hold, will need to be readjusted Continue SS humalog DVT 
-Continue warfarin INR 2.6 
 
CAD Status post CABG and AVR 1/10/2020 Atrial Fibrillation Currently Rate Controlled Anticoagulated with Coumadin Plan: 
-Continue Home Meds Depression No longer suicidal 
Denies suicidal homicidal ideation Plan: 
-Continue BuSpar 10 mg 3 times daily DC planning/Dispo:   Placement pending Pt is currently in ICU requiring BIPAP. Will transfer to floor once weaned down to nasal canula Diet:  DIET DIABETIC CONSISTENT CARB 
DIET NUTRITIONAL SUPPLEMENTS 
DVT ppx: Coumadin Signed: 
Jose Nguyen MD

## 2020-03-07 NOTE — PROGRESS NOTES
Warfarin dosing per pharmacist 
 
Rita Ambrosio is a 69 y.o. male. Height: 5' 10\" (177.8 cm)   Weight 103.5 kg (228 lbs) Indication:  AVR and afib Goal INR:  2.5 - 3.5 Home dose:  2.5 mg daily Risk factors or significant drug interactions: amiodarone (dc'd 2/5), Levofloxacin (2/13- 2/20 ), mirtazapine (2/13-2/24), escitalopram (2/19-2/20), trazodone (started 2/24) Other anticoagulants:  N/A Daily Monitoring Date  INR     Warfarin dose HGB              Notes 2/14  2.4  3 mg  8.7 2/15  2.2  4 mg  9.6 2/16  2.1  5 mg  9.8 2/17  2.1  5 mg  9.5 2/18  2.8  2 mg  9.3 2/19  2.5  2 mg  8.7 
2/20  1.9  3 mg   8.8 
2/21  1.9  4 mg  8.6 
2/22  2.1  3 mg  8.5 
2/23  1.9  4 mg  --- 
2/24  1.7  3 mg + 2 mg --- 
2/25  1.7  5 mg  --- 
2/26  1.6  6 mg  --- 
2/27  1.7  6 mg   --- 
2/28  1.7  7.5 mg  --- 
2/29  2.1 ` 6 mg  7.7 
3/1  2.0  7.5 mg  --- 
3/2  2.9  6 mg  --- 
3/3  3.4  5 mg  8.0 
3/4  2.7  7.5mg  8.6 
3/5  2.9  6 mg  8.0 
3/6  3.1  Held  8.4 
3/7  2.6  6 mg  8.0 INR is therapeutic today, has decreased to 2.6. Patient continues to have labile INRs. Warfarin dose was held last night (3/6). Will continue 6 mg daily for now. Further dose adjustments based on INR trend. Pharmacy will continue to follow patient and monitor INR daily. Thank you, Cynthia Dodson, PharmD  [facility] 
[email or web address]

## 2020-03-08 PROBLEM — J96.21 ACUTE ON CHRONIC RESPIRATORY FAILURE WITH HYPOXIA (HCC): Status: ACTIVE | Noted: 2020-01-01

## 2020-03-08 NOTE — PROGRESS NOTES
Warfarin dosing per pharmacist 
 
Ruba Richardson is a 69 y.o. male. Height: 5' 10\" (177.8 cm)   Weight 103.5 kg (228 lbs) Indication:  AVR and afib Goal INR:  2.5 - 3.5 Home dose:  2.5 mg daily Risk factors or significant drug interactions: amiodarone (dc'd 2/5), Levofloxacin (2/13- 2/20 ), mirtazapine (2/13-2/24), escitalopram (2/19-2/20), trazodone (started 2/24) Other anticoagulants:  N/A Daily Monitoring Date  INR     Warfarin dose HGB              Notes 2/14  2.4  3 mg  8.7 2/15  2.2  4 mg  9.6 2/16  2.1  5 mg  9.8 2/17  2.1  5 mg  9.5 2/18  2.8  2 mg  9.3 2/19  2.5  2 mg  8.7 
2/20  1.9  3 mg   8.8 
2/21  1.9  4 mg  8.6 
2/22  2.1  3 mg  8.5 
2/23  1.9  4 mg  --- 
2/24  1.7  3 mg + 2 mg --- 
2/25  1.7  5 mg  --- 
2/26  1.6  6 mg  --- 
2/27  1.7  6 mg   --- 
2/28  1.7  7.5 mg  --- 
2/29  2.1 ` 6 mg  7.7 
3/1  2.0  7.5 mg  --- 
3/2  2.9  6 mg  --- 
3/3  3.4  5 mg  8.0 
3/4  2.7  7.5mg  8.6 
3/5  2.9  6 mg  8.0 
3/6  3.1  Held  8.4 
3/7  2.6  6 mg  8.0 
3/8  2.7  6mg INR is therapeutic today, has slightly increased to 2.7. Patient continues to have labile INRs. Warfarin dose was held last night (3/6). Will continue 6 mg daily for now. Further dose adjustments based on INR trend. Pharmacy will continue to follow patient and monitor INR daily. Thank you, Michelle Salinas, PharmD  [facility] 
[email or web address]

## 2020-03-08 NOTE — PROGRESS NOTES
Pharmacokinetic Consult to Pharmacist 
 
Ibeth Sherwin is a 69 y.o. male being treated for HAP with vacomycin. Height: 5' 10\" (177.8 cm)  Weight: 114 kg (251 lb 5.2 oz) Lab Results Component Value Date/Time BUN 18 03/08/2020 03:21 AM  
 Creatinine 1.88 (H) 03/08/2020 03:21 AM  
 WBC 6.9 03/08/2020 03:21 AM  
 Procalcitonin 0.78 02/13/2020 03:44 PM  
 Lactic acid 2.0 02/13/2020 03:44 PM  
  
Estimated Creatinine Clearance: 44.3 mL/min (A) (based on SCr of 1.88 mg/dL (H)). CULTURES: 
3/6 Blood cultures x 2 - NGTD 
2/17 Sputum - Normal respiratory yessenia Lab Results Component Value Date/Time Vancomycin,trough 16.9 03/08/2020 10:16 AM  
 Vancomycin, random 18.6 01/18/2020 04:26 AM  
 
 
Day 3 of vancomycin. Goal trough is 15-20. Vancomycin trough prior to 3rd dose was 16.9. Will decrease to vancomycin 1250 mg IV q24h. Consider obtaining vancomycin trough prior to 3rd dose of new regimen. Will continue to follow patient. Thank you, Grace Huynh, PharmD  [facility] 
[email or web address] 
 Mauc Instructions: By selecting yes to the question below the MAUC number will be added into the note.  This will be calculated automatically based on the diagnosis chosen, the size entered, the body zone selected (H,M,L) and the specific indications you chose. You will also have the option to override the Mohs AUC if you disagree with the automatically calculated number and this option is found in the Case Summary tab.

## 2020-03-08 NOTE — PROGRESS NOTES
Hospitalist Note Admit Date:  2020  3:03 PM  
Name:  Martha Mendoza Age:  69 y.o. 
:  1945 MRN:  [id number] PCP:  Susi Stinson MD 
Treatment Team: Attending Provider: Saige Merino MD; Consulting Provider: Lili Renner MD; Utilization Review: Alberto Salas RN; Hospitalist: Jessica Rankin NP; Hospitalist: Abhi Hale NP; Consulting Provider: Deborah Yin MD; Consulting Provider: Mark Mcmillan MD; Care Manager: Radha Montanez.; Utilization Review: Kristine Colvin; Consulting Provider: Duarte Pham MD; Consulting Provider: Roman Landeros MD; Consulting Provider: Elliot Germain MD 
 
HPI/Subjective:  
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 CXR showed interstitial edema/ left pleural effusion. CTA chest no PE. He completed a course of levaquin 20. He was seen by pulmonary s/p bronch 20. He had some hypotension started on midodrine. He had worsening depression, seen by tele psyche for suicidal ideation with sitter that resolved. Discharge plans pending due to recurrent insurance denials. Pt transferred to ICU on 3/6 for BIPAP in view of worsening respiratory distress in view CXR with increased consolidation in left base. Pt spiking intermittent fever. There is also a concern for silent aspiration. ID on board, continuing IV vanc and cefepime. Blood and sputum culture negative so far. Speech therapy on board. 3/8: 
Pt is op optiflow this AM, tolerating it well. Having frequent bouts of weak cough with yellow sputum He denies any pain, nausea/vomiting, diarrhea. Low grade fever of 100.2 ROS: 
10 point ROS negative except what mentioned above. Objective:  
 
Patient Vitals for the past 24 hrs: Temp Pulse Resp BP SpO2  
03/08/20 0701  75 17 104/56 97 % 03/08/20 0602  80 25    
03/08/20 0601    106/68 98 % 03/08/20 0501  73 26 102/55 96 % 03/08/20 0402  82 17    
03/08/20 0401 99 °F (37.2 °C)   107/55 97 % 03/08/20 0344     95 % 03/08/20 0301  72 21 111/58 93 % 03/08/20 0101  71 21 106/56 94 % 03/08/20 0000 98.4 °F (36.9 °C)      
03/07/20 2306     100 % 03/07/20 2301  79 10 127/67 100 % 03/07/20 2201  77 19 98/55 94 % 03/07/20 2101    93/54 97 % 03/07/20 2100  79 21    
03/07/20 2001  79 22 114/56 94 % 03/07/20 1921     96 % 03/07/20 1901 100.2 °F (37.9 °C) 76 22 118/59 96 % 03/07/20 1801  76 21 113/55 94 % 03/07/20 1702  77 (!) 34 109/57 94 % 03/07/20 1601 99 °F (37.2 °C) 84 27 116/58 96 % 03/07/20 1502  85 28  96 % 03/07/20 1501  84  122/58 94 % 03/07/20 1403     94 % 03/07/20 1401  88 28 128/59 95 % 03/07/20 1320  82  131/59   
03/07/20 1316  78 22 131/59 95 % 03/07/20 1300  76 21 128/60 96 % 03/07/20 1232  78 19 126/56 95 % 03/07/20 1230  82  119/59   
03/07/20 1200  80 (!) 40 111/53 99 % 03/07/20 1131  77 27 124/58 98 % 03/07/20 1130  78  124/58   
03/07/20 1115  77 21 136/62 100 % 03/07/20 1109 97.7 °F (36.5 °C)      
03/07/20 1100  77  132/61   
03/07/20 1030  80  133/64   
03/07/20 1015  78 21 133/64 97 % 03/07/20 1000  77 (!) 32 129/55 93 % 03/07/20 0915  78 17 120/59 95 % 03/07/20 0900  82 29 126/56 95 % 03/07/20 0815  80 28 133/63 94 % 03/07/20 0804     93 % Oxygen Therapy O2 Sat (%): 97 % (03/08/20 0701) Pulse via Oximetry: 76 beats per minute (03/08/20 0701) O2 Device: BIPAP (03/08/20 0344) O2 Flow Rate (L/min): 10 l/min (03/07/20 1403) O2 Temperature: 87.8 °F (31 °C) (03/06/20 0921) FIO2 (%): 40 % (03/08/20 0344) Estimated body mass index is 36.06 kg/m² as calculated from the following: Height as of this encounter: 5' 10\" (1.778 m). Weight as of this encounter: 114 kg (251 lb 5.2 oz). Intake/Output Summary (Last 24 hours) at 3/8/2020 0750 Last data filed at 3/8/2020 0400 Gross per 24 hour Intake  Output 2800 ml Net -2800 ml *Note that automatically entered I/Os may not be accurate; dependent on patient compliance with collection and accurate  by techs. General:    Elderly male, obese, on optiflow CV:   RRR. No murmur, rub, or gallop. Lungs:   Clear on right, diminished on left Abdomen:   Soft, nontender, nondistended. Extremities: Warm and dry. Trace edema bilaterally LE. Gangrenous left big and 2nd/3rd toes Skin:     No rashes or jaundice. Neuro:  No gross focal deficits Data Review: 
I have reviewed all labs, meds, and studies from the last 24 hours: 
 
Recent Results (from the past 24 hour(s)) GLUCOSE, POC Collection Time: 03/07/20 11:08 AM  
Result Value Ref Range Glucose (POC) 132 (H) 65 - 100 mg/dL GLUCOSE, POC Collection Time: 03/07/20  4:37 PM  
Result Value Ref Range Glucose (POC) 230 (H) 65 - 100 mg/dL GLUCOSE, POC Collection Time: 03/07/20  9:23 PM  
Result Value Ref Range Glucose (POC) 120 (H) 65 - 100 mg/dL CBC W/O DIFF Collection Time: 03/08/20  3:21 AM  
Result Value Ref Range WBC 6.9 4.3 - 11.1 K/uL  
 RBC 2.72 (L) 4.23 - 5.6 M/uL HGB 7.9 (L) 13.6 - 17.2 g/dL HCT 26.3 (L) 41.1 - 50.3 % MCV 96.7 79.6 - 97.8 FL  
 MCH 29.0 26.1 - 32.9 PG  
 MCHC 30.0 (L) 31.4 - 35.0 g/dL  
 RDW 15.5 (H) 11.9 - 14.6 % PLATELET 235 472 - 778 K/uL MPV 9.6 9.4 - 12.3 FL ABSOLUTE NRBC 0.00 0.0 - 0.2 K/uL METABOLIC PANEL, BASIC Collection Time: 03/08/20  3:21 AM  
Result Value Ref Range Sodium 139 136 - 145 mmol/L Potassium 4.5 3.5 - 5.1 mmol/L Chloride 103 98 - 107 mmol/L  
 CO2 33 (H) 21 - 32 mmol/L Anion gap 3 (L) 7 - 16 mmol/L Glucose 109 (H) 65 - 100 mg/dL  BUN 18 8 - 23 MG/DL  
 Creatinine 1.88 (H) 0.8 - 1.5 MG/DL  
 GFR est AA 45 (L) >60 ml/min/1.73m2 GFR est non-AA 38 (L) >60 ml/min/1.73m2 Calcium 8.0 (L) 8.3 - 10.4 MG/DL All Micro Results Procedure Component Value Units Date/Time CULTURE, BLOOD [[id number]] Collected:  03/06/20 7541 Order Status:  Completed Specimen:  Blood Updated:  03/07/20 4321 Special Requests: --     
  LEFT Antecubital 
  
  Culture result: NO GROWTH AFTER 20 HOURS     
 CULTURE, BLOOD [[id number]] Collected:  03/06/20 0844 Order Status:  Completed Specimen:  Blood Updated:  03/07/20 6827 Special Requests: --     
  RIGHT Antecubital 
  
  Culture result: NO GROWTH AFTER 20 HOURS     
 CULTURE, RESPIRATORY/SPUTUM/BRONCH Stephane Oven STAIN [[id number]] Order Status:  Sent Specimen:  Sputum C. DIFFICILE AG & TOXIN A/B [[id number]] Order Status:  Canceled Specimen:  Stool CULTURE, RESPIRATORY/SPUTUM/BRONCH Maria Teresa Rodgers [[id number]] Collected:  02/17/20 1220 Order Status:  Completed Specimen:  Sputum from Bronchial Washing Updated:  02/24/20 1244 Special Requests: NO SPECIAL REQUESTS     
  GRAM STAIN 20 TO 35 WBCS SEEN PER OIF  
   1 TO 2 EPITHELIAL CELLS SEEN PER OIF  
      
  MODERATE GRAM POSITIVE COCCI  
     
   FEW GRAM POSITIVE RODS Culture result:    
  LIGHT NORMAL RESPIRATORY BRENNAN No results found for this visit on 02/13/20. Current Meds: 
Current Facility-Administered Medications Medication Dose Route Frequency  sodium chloride (OCEAN) 0.65 % nasal squeeze bottle 2 Spray  2 Spray Both Nostrils Q2H PRN  
 morphine injection 2 mg  2 mg IntraVENous Q6H PRN  
 cefepime (MAXIPIME) 2 g in 0.9% sodium chloride (MBP/ADV) 100 mL  2 g IntraVENous Q12H  
 vancomycin (VANCOCIN) 1500 mg in  ml infusion  1,500 mg IntraVENous Q24H  
 albuterol-ipratropium (DUO-NEB) 2.5 MG-0.5 MG/3 ML  3 mL Nebulization Q6H RT  
  sodium chloride 3% hypertonic nebulizer soln  4 mL Nebulization BID RT  
 warfarin (COUMADIN) tablet 6 mg  6 mg Oral QPM  
 sodium chloride (OCEAN) 0.65 % nasal squeeze bottle 2 Spray  2 Spray Both Nostrils BID  [Held by provider] insulin lispro (HUMALOG) injection 8 Units  8 Units SubCUTAneous TIDAC  ferric gluconate (FERRLECIT) 125 mg in 0.9% sodium chloride 100 mL IVPB  125 mg IntraVENous DAILY  epoetin maritza-epbx (RETACRIT) 14,000 Units combo injection  14,000 Units SubCUTAneous Q7D  
 [Held by provider] insulin glargine (LANTUS) injection 45 Units  45 Units SubCUTAneous QHS  insulin lispro (HUMALOG) injection   SubCUTAneous AC&HS  polyethylene glycol (MIRALAX) packet 17 g  17 g Oral DAILY  clonazePAM (KlonoPIN) tablet 0.5 mg  0.5 mg Oral QHS PRN  
 traZODone (DESYREL) tablet 100 mg  100 mg Oral QHS  midodrine (PROAMITINE) tablet 10 mg  10 mg Oral TID WITH MEALS  tamsulosin (FLOMAX) capsule 0.4 mg  0.4 mg Oral QHS  busPIRone (BUSPAR) tablet 10 mg  10 mg Oral TID  ALPRAZolam (XANAX) tablet 0.5 mg  0.5 mg Oral QID PRN  
 sodium chloride (NS) flush 5-40 mL  5-40 mL IntraVENous Q8H  
 loperamide (IMODIUM) capsule 4 mg  4 mg Oral QID PRN  pantothenic ac-min oil-pet,hyd (AQUAPHOR) 41 % ointment   Topical DAILY  traMADol (ULTRAM) tablet 50 mg  50 mg Oral Q6H PRN  
 sodium chloride (NS) flush 5-40 mL  5-40 mL IntraVENous Q8H  
 sodium chloride (NS) flush 5-40 mL  5-40 mL IntraVENous PRN  
 acetaminophen (TYLENOL) tablet 650 mg  650 mg Oral Q4H PRN  
 naloxone (NARCAN) injection 0.4 mg  0.4 mg IntraVENous PRN  
 ondansetron (ZOFRAN) injection 4 mg  4 mg IntraVENous Q4H PRN  
 bisacodyL (DULCOLAX) tablet 5 mg  5 mg Oral DAILY PRN Other Studies (last 24 hours): Xr Chest Sngl V Result Date: 3/8/2020 EXAM: Chest x-ray. INDICATION: Dyspnea. COMPARISON: Yesterday's chest x-ray. TECHNIQUE: Frontal view chest x-ray.  FINDINGS: Previous left lung edema or infiltrate and pleural effusion has improved. The right lung remains clear. Again noted is cardiomegaly, a prior sternotomy, a prosthetic heart valve, a right chest wall pacemaker and a left-sided dialysis catheter. No pneumothorax is identified. IMPRESSION: Improving left lung edema or infiltrate and pleural effusion. Assessment and Plan:  
 
Hospital Problems as of 3/8/2020 Date Reviewed: 2/24/2020 Codes Class Noted - Resolved POA  
 DNR (do not resuscitate) (Chronic) ICD-10-CM: E50 ICD-9-CM: V49.86  2/26/2020 - Present Yes Acute renal failure on dialysis [facility]) ICD-10-CM: N17.9, Z99.2 ICD-9-CM: 584.9, V45.11  2/25/2020 - Present Yes Hypotension (Chronic) ICD-10-CM: I95.9 ICD-9-CM: 458.9  2/18/2020 - Present Yes HIT (heparin-induced thrombocytopenia) (HCC) (Chronic) ICD-10-CM: Q48.72 ICD-9-CM: 289.84  1/23/2020 - Present Yes Atrial fibrillation (HCC) (Chronic) ICD-10-CM: I48.91 
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 
ICD-9-CM: 250.40, 583.81  8/26/2019 - Present Yes Obesity, morbid ([address] (Chronic) ICD-10-CM: E66.01 
ICD-9-CM: 278.01  10/19/2018 - Present Yes DM type 2 (diabetes mellitus, type 2) (HCC) (Chronic) ICD-10-CM: E11.9 ICD-9-CM: 250.00  1/14/2013 - Present Yes HLD (hyperlipidemia) (Chronic) ICD-10-CM: A50.7 ICD-9-CM: 272.4  1/14/2013 - Present Yes RESOLVED: Acute-on-chronic kidney injury ([facility] [address] ICD-10-CM: N17.9, N18.9 ICD-9-CM: 584.9, 585.9  2/17/2020 - 2/25/2020 Yes RESOLVED: Atelectasis ICD-10-CM: J98.11 ICD-9-CM: 518.0  2/17/2020 - 2/25/2020 Yes RESOLVED: Fluid overload ICD-10-CM: E87.70 ICD-9-CM: 276.69  2/15/2020 - 2/25/2020 Yes RESOLVED: HCAP (healthcare-associated pneumonia) ICD-10-CM: J18.9 ICD-9-CM: 083  2/14/2020 - 2/25/2020 Yes RESOLVED: Pleural effusion ICD-10-CM: J90 ICD-9-CM: 511.9  2/3/2020 - 2/25/2020 Yes * (Principal) RESOLVED: Acute hypoxemic respiratory failure ([facility] [address] ICD-10-CM: J96.01 
ICD-9-CM: 518.81  1/10/2020 - 2/25/2020 Yes RESOLVED: HTN (hypertension) ICD-10-CM: I10 
ICD-9-CM: 401.9  1/14/2013 - 2/25/2020 Yes Plan: 
 
Acute respiratory failure/pneumonia CXR: Worsening left-sided consolidation Weaned off to [place] [facility] Continue IV cefepime and vancomycin ESRD on HD Pt dialyzed on 3/7, next dialysis on Tuesday. Given fluid overload, may need another dialysis session on Monday, 3/9/20 Orthostatic hypotension: resolved, BP in normal range Plan: 
-Orthostatic BP 2 times daily 
-Continue Midodrine 10 mg 3 times daily Diabetes mellitus 
- blood sugars controlled with humalog SS 
lantus 45 units on hold, will need to be readjusted Continue SS humalog DVT 
-Continue warfarin INR 2.7 
 
CAD Status post CABG and AVR 1/10/2020 Atrial Fibrillation Currently Rate Controlled Anticoagulated with Coumadin Plan: 
-Continue Home Meds Depression No longer suicidal 
Denies suicidal homicidal ideation Plan: 
-Continue BuSpar 10 mg 3 times daily DC planning/Dispo:   Placement pending Pt is on optiflow, can be transferred to floor if stays stable Diet:  DIET DIABETIC CONSISTENT CARB 
DIET NUTRITIONAL SUPPLEMENTS 
DVT ppx: Coumadin Signed: 
Santos Black MD

## 2020-03-08 NOTE — PROGRESS NOTES
Rosaura Macario Admission Date: 2/13/2020 Daily Progress Note: 3/8/2020 
 
  
 73 yo CM with a history of CAD s/p CABG with AVR 1/10/20 by Dr. Delon Winkler complicated by CKD requiring HD, HIT +, DVTs, wound left thigh and pneumonia. Was discharged on Levaquin Q 48 hours last dose 2/11/20. [facility] was discharged on NC 2 L but has been increased to NC 4L O2 at STR. He presented to the ER on 2/15 with increased SOB. CTA chest was negative for PE but persistent LLL opacity. He had a bronch 2/17 with negative cultures and was treated for PNA using levaquin. He is now ESRD and being followed by nephrology. We were reconsulted on 3/6 for acute respiratory failure requiring bipap. CXR shows increased consolidation in the left base. He is febrile with a temp of 100. 5. he has refused a swallow evaluation. ID is following and he was started on Vanc and Cefepime on 3/6. Subjective:  
 
Remained on BIPAP yesterday, could only come off for about 15 minutes at a time. HD yesterday with 2.3L removed and also had 500cc urine output. Afebrile, WBC down/normal. Feels better. Wants to try off BIPAP today. Still coughing up some small yellow sputum. No new symptoms. Current Facility-Administered Medications Medication Dose Route Frequency  sodium chloride (OCEAN) 0.65 % nasal squeeze bottle 2 Spray  2 Spray Both Nostrils Q2H PRN  
 morphine injection 2 mg  2 mg IntraVENous Q6H PRN  
 cefepime (MAXIPIME) 2 g in 0.9% sodium chloride (MBP/ADV) 100 mL  2 g IntraVENous Q12H  
 vancomycin (VANCOCIN) 1500 mg in  ml infusion  1,500 mg IntraVENous Q24H  
 albuterol-ipratropium (DUO-NEB) 2.5 MG-0.5 MG/3 ML  3 mL Nebulization Q6H RT  
 sodium chloride 3% hypertonic nebulizer soln  4 mL Nebulization BID RT  
 warfarin (COUMADIN) tablet 6 mg  6 mg Oral QPM  
 sodium chloride (OCEAN) 0.65 % nasal squeeze bottle 2 Spray  2 Spray Both Nostrils BID  
  [Held by provider] insulin lispro (HUMALOG) injection 8 Units  8 Units SubCUTAneous TIDAC  ferric gluconate (FERRLECIT) 125 mg in 0.9% sodium chloride 100 mL IVPB  125 mg IntraVENous DAILY  epoetin maritza-epbx (RETACRIT) 14,000 Units combo injection  14,000 Units SubCUTAneous Q7D  
 [Held by provider] insulin glargine (LANTUS) injection 45 Units  45 Units SubCUTAneous QHS  insulin lispro (HUMALOG) injection   SubCUTAneous AC&HS  polyethylene glycol (MIRALAX) packet 17 g  17 g Oral DAILY  clonazePAM (KlonoPIN) tablet 0.5 mg  0.5 mg Oral QHS PRN  
 traZODone (DESYREL) tablet 100 mg  100 mg Oral QHS  midodrine (PROAMITINE) tablet 10 mg  10 mg Oral TID WITH MEALS  tamsulosin (FLOMAX) capsule 0.4 mg  0.4 mg Oral QHS  busPIRone (BUSPAR) tablet 10 mg  10 mg Oral TID  ALPRAZolam (XANAX) tablet 0.5 mg  0.5 mg Oral QID PRN  
 sodium chloride (NS) flush 5-40 mL  5-40 mL IntraVENous Q8H  
 loperamide (IMODIUM) capsule 4 mg  4 mg Oral QID PRN  pantothenic ac-min oil-pet,hyd (AQUAPHOR) 41 % ointment   Topical DAILY  traMADol (ULTRAM) tablet 50 mg  50 mg Oral Q6H PRN  
 sodium chloride (NS) flush 5-40 mL  5-40 mL IntraVENous Q8H  
 sodium chloride (NS) flush 5-40 mL  5-40 mL IntraVENous PRN  
 acetaminophen (TYLENOL) tablet 650 mg  650 mg Oral Q4H PRN  
 naloxone (NARCAN) injection 0.4 mg  0.4 mg IntraVENous PRN  
 ondansetron (ZOFRAN) injection 4 mg  4 mg IntraVENous Q4H PRN  
 bisacodyL (DULCOLAX) tablet 5 mg  5 mg Oral DAILY PRN Objective:  
 
Vitals:  
 03/08/20 2158 03/08/20 0602 03/08/20 8081 03/08/20 8799 BP: 106/68   104/56 Pulse:  80  75 Resp:  25  17 Temp:      
SpO2: 98%   97% Weight:   251 lb 5.2 oz (114 kg) Height:      
 
Intake and Output:  
03/06 1901 - 03/08 0700 In: -  
Out: 3000 [Urine:700] No intake/output data recorded. Physical Exam:  
Constitutional:  the patient is well developed and in no acute distress HEENT:  Sclera clear, pupils equal, oral mucosa moist 
Lungs: crackles bilaterally - decreased on the left. Wearing bipap 14/8 with TV of 550 mls, rate 12, 40% Cardiovascular:  RRR without M,G,R 
Abd/GI: soft and non-tender; with positive bowel sounds. Ext: warm without cyanosis. There is no lower leg edema. Musculoskeletal: moves all four extremities with equal strength Skin:  no jaundice or rashes,  Multiple wounds - sacrum, ischemic toes, thigh Neuro: no gross neuro deficits. Awake and interactive Musculoskeletal: ? can ambulate - in bed at present, not witnessed. No deformity Psychiatric: Calm. Review of Systems - General ROS: positive for  - fever Respiratory ROS: positive for - cough and shortness of breath Cardiovascular ROS: positive for - none Gastrointestinal ROS: positive for - diarrhea Musculoskeletal ROS: positive for - muscular weakness Neurological ROS: positive for - none Lines: dialysis catheter CHEST XRAY: 3/8: improved left sided infiltrates 3/7: Left sided infiltrate/effusion worse 3/6 LAB Recent Labs 03/08/20 
0321 03/07/20 
6818 03/06/20 
0701 WBC 6.9 7.6 7.5 HGB 7.9* 8.0* 8.4* HCT 26.3* 25.9* 27.2*  
 205 227 INR  --  2.6 3.1 Recent Labs 03/08/20 
0321 03/07/20 
3146 03/06/20 
0701  138 138  
K 4.5 5.1 4.6  105 103 CO2 33* 28 29 * 107* 96 BUN 18 25* 25* CREA 1.88* 2.39* 2.06* MG  --  1.9  --   
 
Assessment:  (Medical Decision Making) Hospital Problems  Date Reviewed: 2/24/2020 Codes Class Noted POA Acute on chronic respiratory failure with hypoxia [facility]) ICD-10-CM: J96.21 
ICD-9-CM: 518.84, 799.02  3/8/2020 Unknown DNR (do not resuscitate) (Chronic) ICD-10-CM: C46 ICD-9-CM: V49.86  2/26/2020 Yes Acute renal failure on dialysis [facility]) ICD-10-CM: N17.9, Z99.2 ICD-9-CM: 584.9, V45.11  2/25/2020 Yes Hypotension (Chronic) ICD-10-CM: I95.9 ICD-9-CM: 458.9  2/18/2020 Yes Pneumonia due to infectious organism ICD-10-CM: J18.9 ICD-9-CM: 136.9, 484.8  2/14/2020 Unknown HIT (heparin-induced thrombocytopenia) (HCC) (Chronic) ICD-10-CM: B77.93 ICD-9-CM: 289.84  1/23/2020 Yes Atrial fibrillation (HCC) (Chronic) ICD-10-CM: I48.91 
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 
ICD-9-CM: 250.40, 583.81  8/26/2019 Yes Obesity, morbid ([address] (Chronic) ICD-10-CM: E66.01 
ICD-9-CM: 278.01  10/19/2018 Yes DM type 2 (diabetes mellitus, type 2) (HCC) (Chronic) ICD-10-CM: E11.9 ICD-9-CM: 250.00  1/14/2013 Yes HLD (hyperlipidemia) (Chronic) ICD-10-CM: E97.8 ICD-9-CM: 272.4  1/14/2013 Yes Plan:  (Medical Decision Making) 1. Continue BIPAP as needed, trial off to Optiflow today 2. CXR with increased consolidation left base. ID has restarted abx with new fever - now on cefepime and vanc. Blood and sputum cultures pending. Had bronch on 2/17 with negative cultures 3. ST to evaluate swallow when able to be off BIPAP 4. Continue 3% saline, nebs - rebronch if needed. Follow up CXR tomorrow, monitor oxygen needs 5. Confirmed DNR status with patient and son at bedside 6. Dialysis per nephrology - blood pressure better. On midodrine 7. Coumadin with history of HIT - INR 2.6 8. Did bedside U/S 3/7 to see if significant effusion on left, but only very small. 9. CXR improved today, continue volume removal with dialysis and antibiotics for HAP. DNR Sadaf Hernandez MD 
 
More than 50% of time documented was spent face-to-face contact with the patient and in the care of the patient on the floor/unit where the patient is located.

## 2020-03-08 NOTE — PROGRESS NOTES
Renal Progress Note Admission Date: 2/13/2020 Subjective: More comfortable. Off bipap, on high flow oxygen Objective:  
 
Physical Exam:   
Patient Vitals for the past 8 hrs: 
 BP Temp Pulse Resp SpO2 Weight 03/08/20 0902 132/61  86 (!) 31 91 %   
03/08/20 0851     91 %   
03/08/20 0813     90 %   
03/08/20 0801 103/58 98.9 °F (37.2 °C) 75 24 96 %   
03/08/20 0701 104/56  75 17 97 %   
03/08/20 0624      114 kg (251 lb 5.2 oz) 03/08/20 0602   80 25    
03/08/20 0601 106/68    98 %   
03/08/20 0501 102/55  73 26 96 %   
03/08/20 0402   82 17    
03/08/20 0401 107/55 99 °F (37.2 °C)   97 %   
03/08/20 0344     95 %   
03/08/20 0301 111/58  72 21 93 %  Gen: comfortable , NAD 
CV: S1, S2 
Lungs: Coarse bilaterally Extem: trace+ edema Current Facility-Administered Medications Medication Dose Route Frequency  sodium chloride (OCEAN) 0.65 % nasal squeeze bottle 2 Spray  2 Spray Both Nostrils Q2H PRN  
 morphine injection 2 mg  2 mg IntraVENous Q6H PRN  
 cefepime (MAXIPIME) 2 g in 0.9% sodium chloride (MBP/ADV) 100 mL  2 g IntraVENous Q12H  
 vancomycin (VANCOCIN) 1500 mg in  ml infusion  1,500 mg IntraVENous Q24H  
 albuterol-ipratropium (DUO-NEB) 2.5 MG-0.5 MG/3 ML  3 mL Nebulization Q6H RT  
 sodium chloride 3% hypertonic nebulizer soln  4 mL Nebulization BID RT  
 warfarin (COUMADIN) tablet 6 mg  6 mg Oral QPM  
 sodium chloride (OCEAN) 0.65 % nasal squeeze bottle 2 Spray  2 Spray Both Nostrils BID  [Held by provider] insulin lispro (HUMALOG) injection 8 Units  8 Units SubCUTAneous TIDAC  epoetin maritza-epbx (RETACRIT) 14,000 Units combo injection  14,000 Units SubCUTAneous Q7D  
 [Held by provider] insulin glargine (LANTUS) injection 45 Units  45 Units SubCUTAneous QHS  insulin lispro (HUMALOG) injection   SubCUTAneous AC&HS  polyethylene glycol (MIRALAX) packet 17 g  17 g Oral DAILY  clonazePAM (KlonoPIN) tablet 0.5 mg  0.5 mg Oral QHS PRN  
 traZODone (DESYREL) tablet 100 mg  100 mg Oral QHS  midodrine (PROAMITINE) tablet 10 mg  10 mg Oral TID WITH MEALS  tamsulosin (FLOMAX) capsule 0.4 mg  0.4 mg Oral QHS  busPIRone (BUSPAR) tablet 10 mg  10 mg Oral TID  ALPRAZolam (XANAX) tablet 0.5 mg  0.5 mg Oral QID PRN  
 sodium chloride (NS) flush 5-40 mL  5-40 mL IntraVENous Q8H  
 loperamide (IMODIUM) capsule 4 mg  4 mg Oral QID PRN  pantothenic ac-min oil-pet,hyd (AQUAPHOR) 41 % ointment   Topical DAILY  traMADol (ULTRAM) tablet 50 mg  50 mg Oral Q6H PRN  
 sodium chloride (NS) flush 5-40 mL  5-40 mL IntraVENous Q8H  
 sodium chloride (NS) flush 5-40 mL  5-40 mL IntraVENous PRN  
 acetaminophen (TYLENOL) tablet 650 mg  650 mg Oral Q4H PRN  
 naloxone (NARCAN) injection 0.4 mg  0.4 mg IntraVENous PRN  
 ondansetron (ZOFRAN) injection 4 mg  4 mg IntraVENous Q4H PRN  
 bisacodyL (DULCOLAX) tablet 5 mg  5 mg Oral DAILY PRN Data Review:  
 
LABS:  
Recent Results (from the past 12 hour(s)) CBC W/O DIFF Collection Time: 03/08/20  3:21 AM  
Result Value Ref Range WBC 6.9 4.3 - 11.1 K/uL  
 RBC 2.72 (L) 4.23 - 5.6 M/uL HGB 7.9 (L) 13.6 - 17.2 g/dL HCT 26.3 (L) 41.1 - 50.3 % MCV 96.7 79.6 - 97.8 FL  
 MCH 29.0 26.1 - 32.9 PG  
 MCHC 30.0 (L) 31.4 - 35.0 g/dL  
 RDW 15.5 (H) 11.9 - 14.6 % PLATELET 969 336 - 450 K/uL MPV 9.6 9.4 - 12.3 FL ABSOLUTE NRBC 0.00 0.0 - 0.2 K/uL METABOLIC PANEL, BASIC Collection Time: 03/08/20  3:21 AM  
Result Value Ref Range Sodium 139 136 - 145 mmol/L Potassium 4.5 3.5 - 5.1 mmol/L Chloride 103 98 - 107 mmol/L  
 CO2 33 (H) 21 - 32 mmol/L Anion gap 3 (L) 7 - 16 mmol/L Glucose 109 (H) 65 - 100 mg/dL BUN 18 8 - 23 MG/DL Creatinine 1.88 (H) 0.8 - 1.5 MG/DL  
 GFR est AA 45 (L) >60 ml/min/1.73m2 GFR est non-AA 38 (L) >60 ml/min/1.73m2  Calcium 8.0 (L) 8.3 - 10.4 MG/DL  
GLUCOSE, POC  
 Collection Time: 03/08/20  8:08 AM  
Result Value Ref Range Glucose (POC) 109 (H) 65 - 100 mg/dL Plan: Active Problems: 
  DM type 2 (diabetes mellitus, type 2) ([address] (1/14/2013) HLD (hyperlipidemia) (1/14/2013) Obesity, morbid ([address] (10/19/2018) Type 2 diabetes with nephropathy ([address] (8/26/2019) CAD (coronary artery disease) (1/10/2020) S/P CABG x 3 (1/10/2020) S/P AVR (1/10/2020) Atrial fibrillation ([address] (1/13/2020) HIT (heparin-induced thrombocytopenia) ([address] (1/23/2020) Pneumonia due to infectious organism (2/14/2020) Hypotension (2/18/2020) Acute renal failure on dialysis ([address] (2/25/2020) DNR (do not resuscitate) (2/26/2020) Acute on chronic respiratory failure with hypoxia ([address] (3/8/2020) DEJUAN/CKD 
- ([facility] schedule) I was hoping for sign of recovery but then CXR with with sign of volume and moved to ICU for bipap. Dialyzed yesterday with fluid removal.  He clearly isn't in recovery yet. Next scheduled HD would be Tuesday, but could consider tx on Monday pending his respiratory status 
  
HIT/SHARON + 
  
ASHD s/p CABG 
  
Orthostatic Hypotension - on midodrine 
  
 Anemia - on IV Fe replacement and shelton

## 2020-03-08 NOTE — PROGRESS NOTES
SPEECH LANGUAGE PATHOLOGY: DYSPHAGIA- Initial Assessment and Discharge NAME/AGE/GENDER: Wilda Castano is a 69 y.o. male DATE: 3/8/2020 PRIMARY DIAGNOSIS: Acute respiratory failure ([facility] [J96.00] Fluid overload [E87.70] Procedure(s) (LRB): BRONCHOSCOPY (N/A) BRONCHIAL ALVEOLAR LAVAGE (N/A) 20 Days Post-Op ICD-10: Treatment Diagnosis: R13.11 Dysphagia, Oral Phase RECOMMENDATIONS  
DIET:  
? PO:  Regular ? Liquids:  regular thin MEDICATIONS: With liquid ASPIRATION PRECAUTIONS · Slow rate of intake · Small bites/sips · Upright at 90 degrees during meal 
  
  
  
  
RECOMMENDATIONS for CONTINUED SPEECH THERAPY:  
No further speech therapy indicated at this time. ASSESSMENT Patient presents with WNL oropharyngeal phase dysphagia. Pt tolerated breakfast tray of regular/thin with no overt signs/sx of aspiration. Recommend regular textures with thin liquids. Medications as tolerated. NO further ST indicated. PLAN   
FREQUENCY/DURATION: No further speech therapy indicated at this time as oropharyngeal swallow function is within normal limits. SUBJECTIVE  
alert History of Present Injury/Illness: Mr. Jaylin Montgomery  has a past medical history of Arthritis, BPH (benign prostatic hyperplasia) (1/14/2013), CAD (coronary artery disease), DM type 2 (diabetes mellitus, type 2) ([facility] (dx 2004), Dyspnea, Gout (1/14/2013), HLD (hyperlipidemia) (1/14/2013), HTN (hypertension), Morbid obesity ([facility] (9/3/14), Psychiatric disorder, Rheumatic fever, Seasonal allergic rhinitis, Severe aortic valve stenosis, Thyroid disease, and Unspecified sleep apnea (2016). [facility] He also  has a past surgical history that includes hx tonsil and adenoidectomy; hx heart catheterization (12/23/2019); hx orthopaedic (Left); hx cataract removal (Bilateral, 2012); and ir insert tunl cvc w port over 5 years (2/4/2020). Previous Dysphagia: NONE REPORTED Diet Prior to Evaluation: regular/thin Orientation: Person Place Time 
 
Pain: Pain Scale 1: Numeric (0 - 10) Pain Intensity 1: 1 Cognitive-Linguistic Screen: 
? Speech Production:  
o WNL ? Expressive Language: 
o WNL ? Receptive Language: 
o WNL ? Cognition:  
o WNL OBJECTIVE Oral Motor:  
· Labial: No impairment · Dentition: Natural 
· Oral Hygiene: Adequate · Lingual: No impairment Swallow evaluation: 
 Patient consumed trials of his regular/thin breakfast tray with no overt signs/sx of aspiration INTERDISCIPLINARY COLLABORATION: Registered Nurse PRECAUTIONS/ALLERGIES: Heparin; Amoxicillin; Keflex [cephalexin]; and Pcn [penicillins] Tool Used: Dysphagia Outcome and Severity Scale (JAROD) Score Comments Normal Diet  [x] 7 With no strategies or extra time needed Functional Swallow  [] 6 May have mild oral or pharyngeal delay Mild Dysphagia  [] 5 Which may require one diet consistency restricted Mild-Moderate Dysphagia  [] 4 With 1-2 diet consistencies restricted Moderate Dysphagia  [] 3 With 2 or more diet consistencies restricted Moderate-Severe Dysphagia  [] 2 With partial PO strategies (trials with ST only) Severe Dysphagia  [] 1 With inability to tolerate any PO safely Score:  Initial: 7 Most Recent: (Date 03/08/20 ) Interpretation of Tool: The Dysphagia Outcome and Severity Scale (JAROD) is a simple, easy-to-use, 7-point scale developed to systematically rate the functional severity of dysphagia based on objective assessment and make recommendations for diet level, independence level, and type of nutrition. Current Medications: No current facility-administered medications on file prior to encounter. Current Outpatient Medications on File Prior to Encounter Medication Sig Dispense Refill  warfarin (COUMADIN) 2.5 mg tablet Take 1 Tab by mouth every evening. 30 Tab 1  
 insulin glargine (LANTUS) 100 unit/mL injection 18 Units by SubCUTAneous route nightly.  1 Vial 0  
  busPIRone (BUSPAR) 10 mg tablet Take 1 Tab by mouth three (3) times daily. 270 Tab 3  
 mirtazapine (REMERON) 15 mg tablet Take 1 Tab by mouth nightly. 30 Tab 3  ALPRAZolam (XANAX) 0.25 mg tablet Take 1 Tab by mouth three (3) times daily as needed for Anxiety. Max Daily Amount: 0.75 mg. 30 Tab 0  
 loperamide (IMODIUM) 2 mg capsule Take 1 Cap by mouth every four (4) hours as needed for Diarrhea.  insulin glargine (LANTUS) 100 unit/mL injection 28 Units by SubCUTAneous route daily. 1 Vial 0  
 traMADol (ULTRAM) 50 mg tablet Take 1 Tab by mouth every six (6) hours as needed for Pain for up to 30 days. Max Daily Amount: 200 mg. 30 Tab 0  
 allopurinol (ZYLOPRIM) 300 mg tablet Take 1 Tab by mouth daily. 90 Tab 3  
 albuterol (PROVENTIL HFA) 90 mcg/actuation inhaler Take 2 Puffs by inhalation every six (6) hours as needed for Wheezing. 3 Inhaler 3  
 lutein 20 mg tab Take 1 tablet by mouth daily.  coenzyme q10-vitamin e (COQ10 ) 100-100 mg-unit cap Take 300 mg by mouth daily.  ascorbic acid (VITAMIN C) 500 mg tablet Take 1,000 mg by mouth daily.  multivitamins-minerals-lutein (CENTRUM SILVER) Tab Take 1 tablet by mouth daily. After treatment position/precautions: · Upright in bed · RN at bedside No further ST indicated Total Treatment Duration:  
Time In: 7262 Time Out: 0900  Carlos Keller MA/NEWTON/SLP

## 2020-03-09 NOTE — PROGRESS NOTES
PT Note: 
Therapist is discontinuing physical therapy at this time due to transfer to unit. Mr. Kevin Cunningham physical therapy goals were not met. Please reorder PT when our services are again appropriate. Thank you. Girish Reilly, PT, DPT 
3/9/2020

## 2020-03-09 NOTE — PROGRESS NOTES
Patient received to the floor. Patient awake resting in bed. In room 825. Oriented to room. AxO x4. Respirations present. On airvo. No signs of distress. S1 and S2 noted. On remote telemetry. Box number [id number]. Radial and pedal pulses palpable bilaterally. Expiratory wheezing noted on bilateral lungs. Bowel sounds active. Last BM 3/7/2020. Dual skin assessment completed with OLYA Archibald. Sacral wound noted. Dressing intact. Dressing intact on bilateral feet. Bed low and locked. Call light within reach. No needs expressed. Will continue to monitor.

## 2020-03-09 NOTE — PROGRESS NOTES
Bedside and Verbal shift change report given to [address] [place] [address] (oncoming nurse) by Kristel Breen RN (offgoing nurse). Report included the following information SBAR, Kardex, ED Summary, Procedure Summary, Intake/Output, MAR and Recent Results.

## 2020-03-09 NOTE — PROGRESS NOTES
A follow up visit was made to the patient. Emotional support, spiritual presence and  
prayer were provided for the patient. He was not alert and he was receiving dialysis. EDMOND Mondragon

## 2020-03-09 NOTE — PROGRESS NOTES
Chart reviewed s/p tx to ICU for BIPAP from 8th floor. Screen completed prior to tx with d/c POC for [facility], awaiting insurance approval with [facility]. Currently pt on opti-flow and BIPAP PRN. CM will continue to follow for any assist and d/c POC. Care Management Interventions PCP Verified by CM: Yes Mode of Transport at Discharge: BLS(Hien White Spouse [phone] ) Transition of Care Consult (CM Consult): Discharge Planning, SNF Discharge Durable Medical Equipment: No 
Physical Therapy Consult: Yes Occupational Therapy Consult: Yes Speech Therapy Consult: No 
Current Support Network: [address] [place] [address] Confirm Follow Up Transport: Family The Plan for Transition of Care is Related to the Following Treatment Goals : SNF The Patient and/or Patient Representative was Provided with a Choice of Provider and Agrees with the Discharge Plan?: Yes Name of the Patient Representative Who was Provided with a Choice of Provider and Agrees with the Discharge Plan: Pt son Freedom of Choice List was Provided with Basic Dialogue that Supports the Patient's Individualized Plan of Care/Goals, Treatment Preferences and Shares the Quality Data Associated with the Providers?: Yes Discharge Location Discharge Placement: Unable to determine at this time

## 2020-03-09 NOTE — PROGRESS NOTES
Hospitalist Note Admit Date:  2020  3:03 PM  
Name:  Carrillo rAias Age:  69 y.o. 
:  1945 MRN:  [id number] PCP:  Marissa Rivera MD 
Treatment Team: Attending Provider: Donell Mitchell MD; Consulting Provider: Frank Gannon MD; Utilization Review: Danika Huerta RN; Hospitalist: Janice Motley NP; Hospitalist: Camila Mar NP; Consulting Provider: Deborah Yin MD; Consulting Provider: Cliff Pedro MD; Care Manager: Juli Douglas.; Utilization Review: Adolm Blizzard; Consulting Provider: Elfreda Merlin, MD; Consulting Provider: Eric Blevins MD; Consulting Provider: Dinesh Delgado MD 
 
HPI/Subjective:  
Mr. 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 CXR showed interstitial edema/left pleural effusion. CTA chest w/o PE. He completed a course of levaquin 20. Fadia Castro was seen by pulmonary s/p bronch 20. He had some hypotension and was started on midodrine. He had worsening depression, seen by tele psyche for suicidal ideation with sitter (now resolved). Discharge plans pending due to recurrent insurance denials. Pt transferred to ICU on 3/6 for BIPAP in view of worsening respiratory distress in view CXR with increased consolidation in left base. Also with intermittent fevers. There is also a concern for silent aspiration. ID following, on IV vanc and cefepime. Blood and sputum culture negative so far.  
 
3/9: Awake, on Optiflow 72%/30L, says he's still coughing up stuff. C/o LUE swelling but mostly around the wrist, nothing proximal to elbow. Afebrile. Wound dressings changed daily (feet/sacrum). ROS neg otherwise. No other complaints Objective:  
 
Patient Vitals for the past 24 hrs: Temp Pulse Resp BP SpO2  
03/09/20 0834  79 (!) 31 116/55 99 % 03/09/20 0718     94 % 03/09/20 0702  79 29 118/63 99 % 03/09/20 0607  80 22    
03/09/20 0605  81 (!) 35 126/58 100 % 03/09/20 0527  81 27    
03/09/20 0526    107/59 98 % 03/09/20 0402  79 23 131/62 97 % 03/09/20 0302 99 °F (37.2 °C)   135/62 98 % 03/09/20 0301  81 22    
03/09/20 0202    123/55 96 % 03/09/20 0201  84 26    
03/09/20 0140     96 % 03/09/20 0103  81 25 113/51 92 % 03/09/20 0001 100 °F (37.8 °C) 79 30 123/73 96 % 03/08/20 2324     94 % 03/08/20 2301  82 29 139/63 95 % 03/08/20 2216  81 24 139/63 97 % 03/08/20 2102  82 25 123/60 93 % 03/08/20 2006  85 26    
03/08/20 2004    115/52 100 % 03/08/20 1926     93 % 03/08/20 1919 98.7 °F (37.1 °C) 80 28 123/55 94 % 03/08/20 1801  82 25 118/63 95 % 03/08/20 1702  88 30 113/65 96 % 03/08/20 1601  81  110/55 93 % 03/08/20 1557     94 % 03/08/20 1502  85 29 111/56 (!) 88 % 03/08/20 1402  83 24 129/57 92 % 03/08/20 1400     93 % 03/08/20 1302  88  128/60 92 % 03/08/20 1221     95 % 03/08/20 1202  83 27 115/59 96 % 03/08/20 1104 98.6 °F (37 °C)      
03/08/20 1102  83 25 118/67 93 % 03/08/20 1002  86  121/55 92 % 03/08/20 1001  81 24  93 % 03/08/20 0902  86 (!) 31 132/61 91 % Oxygen Therapy O2 Sat (%): 99 % (03/09/20 0834) Pulse via Oximetry: 80 beats per minute (03/09/20 0834) O2 Device: Heated; Hi flow nasal cannula (03/09/20 0718) O2 Flow Rate (L/min): 30 l/min (03/09/20 0718) O2 Temperature: 87.8 °F (31 °C) (03/08/20 1557) FIO2 (%): 70 % (03/09/20 0718) Estimated body mass index is 36.69 kg/m² as calculated from the following: 
  Height as of this encounter: 5' 10\" (1.778 m). Weight as of this encounter: 116 kg (255 lb 11.7 oz). Intake/Output Summary (Last 24 hours) at 3/9/2020 1285 Last data filed at 3/9/2020 6231 Gross per 24 hour Intake 1880 ml Output 500 ml Net 1380 ml *Note that automatically entered I/Os may not be accurate; dependent on patient compliance with collection and accurate  by techs. General:    Well nourished. Alert. Obese. CV:   RRR. No murmur, rub, or gallop. Lungs:   CTAB. No rhonchi, or rales. B/l wheezes anterior fields. Optiflow 72%/30L. Abdomen:   Soft, nontender, nondistended. Extremities: Warm and dry. No cyanosis. B/l LE stasis dermatitis, feet wounds wrapped, mild LE edema b/l. Left hand/forearm swelling with palpable cording on dorsum of left wrist but no other appreciable changes. Skin:     No rashes or jaundice. Neuro:  No gross focal deficits Data Review: 
I have reviewed all labs, meds, and studies from the last 24 hours: 
 
Recent Results (from the past 24 hour(s)) Daysi Cr Collection Time: 03/08/20 10:16 AM  
Result Value Ref Range Vancomycin,trough 16.9 5 - 20 ug/mL PROTHROMBIN TIME + INR Collection Time: 03/08/20 10:16 AM  
Result Value Ref Range Prothrombin time 29.9 (H) 12.0 - 14.7 sec INR 2.7 GLUCOSE, POC Collection Time: 03/08/20 11:05 AM  
Result Value Ref Range Glucose (POC) 210 (H) 65 - 100 mg/dL GLUCOSE, POC Collection Time: 03/08/20  5:07 PM  
Result Value Ref Range Glucose (POC) 177 (H) 65 - 100 mg/dL GLUCOSE, POC Collection Time: 03/08/20  9:26 PM  
Result Value Ref Range Glucose (POC) 295 (H) 65 - 100 mg/dL PROTHROMBIN TIME + INR Collection Time: 03/09/20  3:21 AM  
Result Value Ref Range Prothrombin time 27.7 (H) 12.0 - 14.7 sec INR 2.5 GLUCOSE, POC Collection Time: 03/09/20  8:41 AM  
Result Value Ref Range Glucose (POC) 241 (H) 65 - 100 mg/dL All Micro Results Procedure Component Value Units Date/Time CULTURE, RESPIRATORY/SPUTUM/BRONCH Nava Sharda [[id number]] Collected:  03/09/20 7888 Order Status:  Completed Updated:  03/09/20 5844 CULTURE, BLOOD [[id number]] Collected:  03/06/20 4369 Order Status:  Completed Specimen:  Blood Updated:  03/08/20 1347 Special Requests: --     
  LEFT Antecubital 
  
  Culture result: NO GROWTH 2 DAYS     
 CULTURE, BLOOD [[id number]] Collected:  03/06/20 0944 Order Status:  Completed Specimen:  Blood Updated:  03/08/20 1347 Special Requests: --     
  RIGHT Antecubital 
  
  Culture result: NO GROWTH 2 DAYS     
 CULTURE, RESPIRATORY/SPUTUM/BRONCH Peraza Bury STAIN [[id number]] Collected:  03/06/20 1600 Order Status:  Canceled Specimen:  Sputum C. DIFFICILE AG & TOXIN A/B [[id number]] Order Status:  Canceled Specimen:  Stool CULTURE, RESPIRATORY/SPUTUM/BRONCH Sarabjit Fierro [[id number]] Collected:  02/17/20 1220 Order Status:  Completed Specimen:  Sputum from Bronchial Washing Updated:  02/24/20 1244 Special Requests: NO SPECIAL REQUESTS     
  GRAM STAIN 20 TO 35 WBCS SEEN PER OIF  
   1 TO 2 EPITHELIAL CELLS SEEN PER OIF  
      
  MODERATE GRAM POSITIVE COCCI  
     
   FEW GRAM POSITIVE RODS Culture result:    
  LIGHT NORMAL RESPIRATORY BRENNAN Current Meds: 
Current Facility-Administered Medications Medication Dose Route Frequency  HYDROcodone-homatropine (HYCODAN) 5-1.5 mg/5 mL (5 mL) syrup 5 mL  5 mL Oral Q4H PRN  
 benzonatate (TESSALON) capsule 100 mg  100 mg Oral TID PRN  
 guaiFENesin ER (MUCINEX) tablet 1,200 mg  1,200 mg Oral Q12H  
 albuterol (PROVENTIL VENTOLIN) nebulizer solution 2.5 mg  2.5 mg Nebulization Q6H RT  
 vancomycin (VANCOCIN) 1250 mg in  ml infusion  1,250 mg IntraVENous Q24H  
 sodium chloride (OCEAN) 0.65 % nasal squeeze bottle 2 Spray  2 Spray Both Nostrils Q2H PRN  
 morphine injection 2 mg  2 mg IntraVENous Q6H PRN  
 cefepime (MAXIPIME) 2 g in 0.9% sodium chloride (MBP/ADV) 100 mL  2 g IntraVENous Q12H  
 sodium chloride 3% hypertonic nebulizer soln  4 mL Nebulization BID RT  
  warfarin (COUMADIN) tablet 6 mg  6 mg Oral QPM  
 sodium chloride (OCEAN) 0.65 % nasal squeeze bottle 2 Spray  2 Spray Both Nostrils BID  [Held by provider] insulin lispro (HUMALOG) injection 8 Units  8 Units SubCUTAneous TIDAC  epoetin maritza-epbx (RETACRIT) 14,000 Units combo injection  14,000 Units SubCUTAneous Q7D  
 [Held by provider] insulin glargine (LANTUS) injection 45 Units  45 Units SubCUTAneous QHS  insulin lispro (HUMALOG) injection   SubCUTAneous AC&HS  polyethylene glycol (MIRALAX) packet 17 g  17 g Oral DAILY  clonazePAM (KlonoPIN) tablet 0.5 mg  0.5 mg Oral QHS PRN  
 traZODone (DESYREL) tablet 100 mg  100 mg Oral QHS  midodrine (PROAMITINE) tablet 10 mg  10 mg Oral TID WITH MEALS  tamsulosin (FLOMAX) capsule 0.4 mg  0.4 mg Oral QHS  busPIRone (BUSPAR) tablet 10 mg  10 mg Oral TID  ALPRAZolam (XANAX) tablet 0.5 mg  0.5 mg Oral QID PRN  
 sodium chloride (NS) flush 5-40 mL  5-40 mL IntraVENous Q8H  
 loperamide (IMODIUM) capsule 4 mg  4 mg Oral QID PRN  pantothenic ac-min oil-pet,hyd (AQUAPHOR) 41 % ointment   Topical DAILY  traMADol (ULTRAM) tablet 50 mg  50 mg Oral Q6H PRN  
 sodium chloride (NS) flush 5-40 mL  5-40 mL IntraVENous Q8H  
 sodium chloride (NS) flush 5-40 mL  5-40 mL IntraVENous PRN  
 acetaminophen (TYLENOL) tablet 650 mg  650 mg Oral Q4H PRN  
 naloxone (NARCAN) injection 0.4 mg  0.4 mg IntraVENous PRN  
 ondansetron (ZOFRAN) injection 4 mg  4 mg IntraVENous Q4H PRN  
 bisacodyL (DULCOLAX) tablet 5 mg  5 mg Oral DAILY PRN Other Studies: No results found for this visit on 02/13/20. Xr Chest Sngl V Result Date: 3/9/2020 EXAM: Chest x-ray. INDICATION: Dyspnea. COMPARISON: Yesterday's chest x-ray. TECHNIQUE: Frontal view chest x-ray. FINDINGS: Left lung base atelectasis or infiltrate and a small left pleural effusion are unchanged. There is progressed vascular congestion.  Again noted is cardiomegaly, a prior sternotomy, a prosthetic heart valve and a right chest wall pacemaker. No pneumothorax is identified. The left-sided dialysis catheter remains in place. IMPRESSION: 1. Unchanged left lung base atelectasis or infiltrate and small pleural effusion. 2. Progressed pulmonary vascular congestion. Assessment and Plan:  
 
Hospital Problems as of 3/9/2020 Date Reviewed: 3/9/2020 Codes Class Noted - Resolved POA Acute on chronic respiratory failure with hypoxia [facility]) ICD-10-CM: P12.93 
ICD-9-CM: 518.84, 799.02  3/8/2020 - Present Unknown DNR (do not resuscitate) (Chronic) ICD-10-CM: E45 ICD-9-CM: V49.86  2/26/2020 - Present Yes Acute renal failure on dialysis [facility]) ICD-10-CM: N17.9, Z99.2 ICD-9-CM: 584.9, V45.11  2/25/2020 - Present Yes Hypotension (Chronic) ICD-10-CM: I95.9 ICD-9-CM: 458.9  2/18/2020 - Present Yes Pneumonia due to infectious organism ICD-10-CM: J18.9 ICD-9-CM: 136.9, 484.8  2/14/2020 - Present Unknown HIT (heparin-induced thrombocytopenia) (HCC) (Chronic) ICD-10-CM: G27.58 ICD-9-CM: 289.84  1/23/2020 - Present Yes Atrial fibrillation (HCC) (Chronic) ICD-10-CM: I48.91 
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 
ICD-9-CM: 250.40, 583.81  8/26/2019 - Present Yes Obesity, morbid ([address] (Chronic) ICD-10-CM: E66.01 
ICD-9-CM: 278.01  10/19/2018 - Present Yes DM type 2 (diabetes mellitus, type 2) (HCC) (Chronic) ICD-10-CM: E11.9 ICD-9-CM: 250.00  1/14/2013 - Present Yes HLD (hyperlipidemia) (Chronic) ICD-10-CM: H86.6 ICD-9-CM: 272.4  1/14/2013 - Present Yes RESOLVED: Acute-on-chronic kidney injury ([facility] ICD-10-CM: N17.9, N18.9 ICD-9-CM: 584.9, 585.9  2/17/2020 - 2/25/2020 Yes RESOLVED: Atelectasis ICD-10-CM: J98.11 ICD-9-CM: 518.0  2/17/2020 - 2/25/2020 Yes RESOLVED: Fluid overload ICD-10-CM: E87.70 ICD-9-CM: 276.69  2/15/2020 - 2/25/2020 Yes RESOLVED: Pleural effusion ICD-10-CM: J90 ICD-9-CM: 511.9  2/3/2020 - 2/25/2020 Yes * (Principal) RESOLVED: Acute hypoxemic respiratory failure ([facility] ICD-10-CM: J96.01 
ICD-9-CM: 518.81  1/10/2020 - 2/25/2020 Yes RESOLVED: HTN (hypertension) ICD-10-CM: I10 
ICD-9-CM: 401.9  1/14/2013 - 2/25/2020 Yes Plan: # Acute hypoxemic respiratory failure - Progressive LLL consolidation - Back on abx, vanc/cefepime. ID appreciated. - S/p bronch 2/17. Pulm following. # LUE pain 
 - US pending. Suspect superficial phlebitis +/- thrombus. Cold/warm compresses as desired. # DEJUAN now on HD 
 - HD per nephro # DM2 
 - Basal/prandial insulins held, sugars reasonable though occasional spikes throughout the day. Con't SSI. # H/o DVT with + HIT 
 - Warfarin # CAD 
 - S/p CABG/AVR January 2020 # AFib 
 - Home meds. # MDD 
 - Buspar DC planning/Dispo: Unclear. To floor soon pending respiratory status. Diet:  DIET DIABETIC CONSISTENT CARB 
DIET NUTRITIONAL SUPPLEMENTS 
DVT ppx: Warfarin Signed: 
Leah Euceda MD

## 2020-03-09 NOTE — PROGRESS NOTES
Infectious Disease Note Today's Date: 3/9/2020 Admit Date: 2020 Impression: · Acute respiratory failure · Recent CABG/AVR with PPM (20) · DEJUAN on CKD, now on HD. 
· HIT+ with frankie LE/RUE DVTs, on coumadin Plan: · Follow BCs (at risk due to HD line)-NGTD, sputum cx just checked today · Continue Vancomycin and Cefepime for now. · Thick secretions noted but believe volume overload playing major role. Anti-infectives: · Vanc/Cefepime 3/6- · Levaquin - Subjective: Tm 100F, no leukocytosis. Currently on Optifow but feels like he is \"breathing nothing. \" Cannot catch breath similar to 3/6 visit. Allergies Allergen Reactions  Heparin Other (comments) HIT antibody test strongly positive on 2020. SHARON drawn 2020 and resulted positive on 2020  Amoxicillin Rash  Keflex [Cephalexin] Rash  Pcn [Penicillins] Other (comments) \"felt closed in\". No rash Review of Systems:  A comprehensive review of systems was negative except for that written in the History of Present Illness. Objective:  
 
Visit Vitals /63 Pulse 80 Temp 98 °F (36.7 °C) Resp 27 Ht 5' 10\" (1.778 m) Wt 116 kg (255 lb 11.7 oz) SpO2 95% BMI 36.69 kg/m² Temp (24hrs), Av.9 °F (37.2 °C), Min:98 °F (36.7 °C), Max:100 °F (37.8 °C) No changes from my previous exam 3/6 Lines:  Peripheral IV:    
 
Physical Exam:   
General:  Alert, cooperative,moderate distress--cannot complete sentences due to SOB, appears stated age Eyes:  Sclera anicteric. Pupils equally round and reactive to light. Mouth/Throat: Mucous membranes normal, oral pharynx clear Neck: Supple Lungs:   Very diminished anteriorly, exp wheezing to RLL  
CV:  Regular rate and rhythm,no murmur, click, rub or gallop Abdomen:   Soft, non-tender. bowel sounds normal. non-distended Extremities: No cyanosis. +2 generalized Skin: Skin color, texture, turgor normal. no acute rash or lesions. Sternal and LLE donor sites benign, frankie necrotic toes Lymph nodes: Cervical and supraclavicular normal  
Musculoskeletal: No swelling or deformity Lines/Devices:  Intact, no erythema, drainage or tenderness Psych: Alert and oriented, normal mood affect given the setting Data Review: CBC: 
Recent Labs 03/08/20 
0321 03/07/20 
6648 WBC 6.9 7.6 GRANS  --  56 MONOS  --  9  
EOS  --  2  
ANEU  --  4.3 ABL  --  2.4 HGB 7.9* 8.0*  
HCT 26.3* 25.9*  
 205 BMP: 
Recent Labs 03/08/20 
0321 03/07/20 
5316 CREA 1.88* 2.39* BUN 18 25*  138  
K 4.5 5.1  105 CO2 33* 28 AGAP 3* 5* * 107* LFTS: 
No results for input(s): TBILI, ALT, SGOT, AP, TP, ALB in the last 72 hours. Microbiology:  
 
All Micro Results Procedure Component Value Units Date/Time CULTURE, RESPIRATORY/SPUTUM/BRONCH Nava Robin [[id number]] Collected:  03/09/20 3856 Order Status:  Completed Updated:  03/09/20 7307 CULTURE, BLOOD [[id number]] Collected:  03/06/20 0563 Order Status:  Completed Specimen:  Blood Updated:  03/08/20 1347 Special Requests: --     
  LEFT Antecubital 
  
  Culture result: NO GROWTH 2 DAYS     
 CULTURE, BLOOD [[id number]] Collected:  03/06/20 0944 Order Status:  Completed Specimen:  Blood Updated:  03/08/20 1347 Special Requests: --     
  RIGHT Antecubital 
  
  Culture result: NO GROWTH 2 DAYS     
 CULTURE, RESPIRATORY/SPUTUM/BRONCH Walker Fluke STAIN [[id number]] Collected:  03/06/20 1600 Order Status:  Canceled Specimen:  Sputum C. DIFFICILE AG & TOXIN A/B [[id number]] Order Status:  Canceled Specimen:  Stool CULTURE, RESPIRATORY/SPUTUM/BRONCH Navajacquie Arthurs [[id number]] Collected:  02/17/20 1220 Order Status:  Completed Specimen:  Sputum from Bronchial Washing Updated:  02/24/20 1244 Special Requests: NO SPECIAL REQUESTS GRAM STAIN 20 TO 35 WBCS SEEN PER OIF  
   1 TO 2 EPITHELIAL CELLS SEEN PER OIF  
      
  MODERATE GRAM POSITIVE COCCI  
     
   FEW GRAM POSITIVE RODS Culture result:    
  LIGHT NORMAL RESPIRATORY BRENNAN Imaging:  
See hPI/EPIC Signed By: Adilia Jo NP March 9, 2020

## 2020-03-09 NOTE — PROGRESS NOTES
Critical Care Daily Progress Note: 3/9/2020 Emily Fragoso Admission Date: 2/13/2020 The patient's chart is reviewed and the patient is discussed with the staff. Lemuel Mcgarry a history of CAD s/p CABG with AVR 1/10/20 by Dr. Migue Parker complicated by CKD requiring HD, HIT +, DVTs, wound left thigh and pneumonia.  Was discharged on Levaquin Q 48 hours last dose 2/11/20. [facility] was discharged on NC 2 L but has been increased to NC 4L O2 at [facility]. He presented to the ER on 2/15 with increased SOB. CTA chest was negative for PE but persistent LLL opacity. He had a bronch 2/17 with negative cultures and was treated for PNA using levaquin. He is now ESRD and being followed by nephrology. We were reconsulted on 3/6 for acute respiratory failure requiring bipap. CXR shows increased consolidation in the left base. He is febrile with a temp of 100.5. He has refused a swallow evaluation. ID is following and he was started on Vanc and Cefepime on 3/6. Subjective:  
 
Pt lying in bed on optiflow 30L at 72%. Pt reports that he likes the BiPAP better. Pt feels like it pushes more air into him. He was reminded that his sats are 96% on optiflow. He is coughing up thick, yellow to gray phlegm. He complains about LUE pain. A doppler ultrasound is pending. Current Facility-Administered Medications Medication Dose Route Frequency  HYDROcodone-homatropine (HYCODAN) 5-1.5 mg/5 mL (5 mL) syrup 5 mL  5 mL Oral Q4H PRN  
 benzonatate (TESSALON) capsule 100 mg  100 mg Oral TID PRN  
 vancomycin (VANCOCIN) 1250 mg in  ml infusion  1,250 mg IntraVENous Q24H  
 sodium chloride (OCEAN) 0.65 % nasal squeeze bottle 2 Spray  2 Spray Both Nostrils Q2H PRN  
 morphine injection 2 mg  2 mg IntraVENous Q6H PRN  
 cefepime (MAXIPIME) 2 g in 0.9% sodium chloride (MBP/ADV) 100 mL  2 g IntraVENous Q12H  
 albuterol-ipratropium (DUO-NEB) 2.5 MG-0.5 MG/3 ML  3 mL Nebulization Q6H RT  
  sodium chloride 3% hypertonic nebulizer soln  4 mL Nebulization BID RT  
 warfarin (COUMADIN) tablet 6 mg  6 mg Oral QPM  
 sodium chloride (OCEAN) 0.65 % nasal squeeze bottle 2 Spray  2 Spray Both Nostrils BID  [Held by provider] insulin lispro (HUMALOG) injection 8 Units  8 Units SubCUTAneous TIDAC  epoetin maritza-epbx (RETACRIT) 14,000 Units combo injection  14,000 Units SubCUTAneous Q7D  
 [Held by provider] insulin glargine (LANTUS) injection 45 Units  45 Units SubCUTAneous QHS  insulin lispro (HUMALOG) injection   SubCUTAneous AC&HS  polyethylene glycol (MIRALAX) packet 17 g  17 g Oral DAILY  clonazePAM (KlonoPIN) tablet 0.5 mg  0.5 mg Oral QHS PRN  
 traZODone (DESYREL) tablet 100 mg  100 mg Oral QHS  midodrine (PROAMITINE) tablet 10 mg  10 mg Oral TID WITH MEALS  tamsulosin (FLOMAX) capsule 0.4 mg  0.4 mg Oral QHS  busPIRone (BUSPAR) tablet 10 mg  10 mg Oral TID  ALPRAZolam (XANAX) tablet 0.5 mg  0.5 mg Oral QID PRN  
 sodium chloride (NS) flush 5-40 mL  5-40 mL IntraVENous Q8H  
 loperamide (IMODIUM) capsule 4 mg  4 mg Oral QID PRN  pantothenic ac-min oil-pet,hyd (AQUAPHOR) 41 % ointment   Topical DAILY  traMADol (ULTRAM) tablet 50 mg  50 mg Oral Q6H PRN  
 sodium chloride (NS) flush 5-40 mL  5-40 mL IntraVENous Q8H  
 sodium chloride (NS) flush 5-40 mL  5-40 mL IntraVENous PRN  
 acetaminophen (TYLENOL) tablet 650 mg  650 mg Oral Q4H PRN  
 naloxone (NARCAN) injection 0.4 mg  0.4 mg IntraVENous PRN  
 ondansetron (ZOFRAN) injection 4 mg  4 mg IntraVENous Q4H PRN  
 bisacodyL (DULCOLAX) tablet 5 mg  5 mg Oral DAILY PRN Review of Systems 
+cough-yellow/gray sputum +LUE pain/edema 
+dyspnea Constitutional:  negative for fever, chills, sweats Cardiovascular:  negative for chest pain, palpitations, syncope, edema Gastrointestinal:  negative for dysphagia, reflux, vomiting, diarrhea, abdominal pain, or melena Neurologic:  negative for focal weakness, numbness, headache Objective:  
 
Vitals:  
 03/09/20 9288 03/09/20 3437 03/09/20 1881 03/09/20 2335 BP:  126/58 Pulse:   80 Resp:   22 Temp:      
SpO2:  100%  94% Weight: 255 lb 11.7 oz (116 kg) Height:      
 
 
 
Intake/Output Summary (Last 24 hours) at 3/9/2020 1063 Last data filed at 3/9/2020 8525 Gross per 24 hour Intake 1880 ml Output 500 ml Net 1380 ml Physical Exam:         
Constitutional:  the patient is well developed and in no acute distress, on optiflow 30L at 72% EENMT:  Sclera clear, pupils equal, oral mucosa moist 
Respiratory: insp wheezing anteriorly Cardiovascular:  RRR without M,G,R 
Gastrointestinal: soft and non-tender; with positive bowel sounds. Musculoskeletal: warm without cyanosis. 2+ LUE edema, 1+ BLE edema Skin:  no jaundice or rashes Neurologic: no gross neuro deficits Psychiatric:  alert and oriented x 3 LINES: 
Peripheral IV L arm, HD access DRIPS: 
none CXR:  
 
 
LAB Recent Labs 03/08/20 
2126 03/08/20 
1707 03/08/20 
1105 03/08/20 
0808 03/07/20 
2123 GLUCPOC 295* 177* 210* 109* 120* Recent Labs 03/09/20 
0321 03/08/20 
1016 03/08/20 
0321 03/07/20 
0048 WBC  --   --  6.9 7.6 HGB  --   --  7.9* 8.0* HCT  --   --  26.3* 25.9*  
PLT  --   --  202 205 INR 2.5 2.7  --  2.6 Recent Labs 03/08/20 
0321 03/07/20 
6242  138  
K 4.5 5.1  105 CO2 33* 28 * 107* BUN 18 25* CREA 1.88* 2.39* MG  --  1.9 CA 8.0* 8.1* No results for input(s): PH, PCO2, PO2, HCO3, PHI, PCO2I, PO2I, HCO3I in the last 72 hours. No results for input(s): LCAD, LAC in the last 72 hours. No results for input(s): PH, PCO2, PO2, HCO3, PHI, PCO2I, PO2I, HCO3I in the last 72 hours. Patient Active Problem List  
Diagnosis Code  DM type 2 (diabetes mellitus, type 2) ([facility] E11.9  
 Gout M10.9  BPH (benign prostatic hyperplasia) N40.0  Seasonal allergic rhinitis J30.2  
 HLD (hyperlipidemia) E78.5  Nonrheumatic aortic valve stenosis I35.0  Obesity, morbid ([address] E66.01  
 Type 2 diabetes with nephropathy (HCC) E11.21  
 Bilateral carotid artery stenosis I65.23  
 Aortic valve stenosis I35.0  
 CAD (coronary artery disease) I25.10  
 S/P CABG x 3 Z95.1  
 S/P AVR Z95.2  Atrial fibrillation (HCC) I48.91  
 HIT (heparin-induced thrombocytopenia) ([facility]) D75.82  
 Pneumonia due to infectious organism J18.9  Hypotension I95.9  Acute renal failure on dialysis (HCC) N17.9, Z99.2  DNR (do not resuscitate) [id number]  Acute on chronic respiratory failure with hypoxia ([facility]) J96.21 Assessment:  (Medical Decision Making) Hospital Problems  Date Reviewed: 3/9/2020 Codes Class Noted POA Acute on chronic respiratory failure with hypoxia [facility]) ICD-10-CM: J96.21 
ICD-9-CM: 518.84, 799.02  3/8/2020 Unknown Wean FiO2 as tolerated DNR (do not resuscitate) (Chronic) ICD-10-CM: F81 ICD-9-CM: V49.86  2/26/2020 Yes Pt's request   
 Acute renal failure on dialysis [facility]) ICD-10-CM: N17.9, Z99.2 ICD-9-CM: 584.9, V45.11  2/25/2020 Yes ESRD-on HD schedule, per nephrology Hypotension (Chronic) ICD-10-CM: I95.9 ICD-9-CM: 458.9  2/18/2020 Yes Resolving Pneumonia due to infectious organism ICD-10-CM: J18.9 ICD-9-CM: 136.9, 484.8  2/14/2020 Unknown On cefepime and Vanc HIT (heparin-induced thrombocytopenia) (HCC) (Chronic) ICD-10-CM: D17.58 ICD-9-CM: 289.84  1/23/2020 Yes On coumadin, INR 2.5 Atrial fibrillation (HCC) (Chronic) ICD-10-CM: I48.91 
ICD-9-CM: 427.31  1/13/2020 Yes Paced now. CAD (coronary artery disease) (Chronic) ICD-10-CM: I25.10 ICD-9-CM: 414.00  1/10/2020 Yes S/p CABG   
 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 
ICD-9-CM: 250.40, 583.81  8/26/2019 Yes SSI Obesity, morbid ([address] (Chronic) ICD-10-CM: E66.01 
ICD-9-CM: 278.01  10/19/2018 Yes  
 BMI > 40 HLD (hyperlipidemia) (Chronic) ICD-10-CM: H59.0 ICD-9-CM: 272.4  1/14/2013 Yes Chronic Plan:  (Medical Decision Making) --continue BiPAP prn 
--wean Optiflow as tolerated 
--ID following, on Cefepime and Vanc 
--BC and sputum cx: pending 
--d/c duoneb and change to albuterol nebs 
--continue 3% saline nebs, add mucinex 
--continue HD per nephrology --continue coumadin with hx HIT 
--pt is DNR More than 50% of the time documented was spent in face-to-face contact with the patient and in the care of the patient on the floor/unit where the patient is located. SURENDRA Pineda Lungs:  Decreased BS with a few scattered wheezes Heart:  RRR with no Murmur/Rubs/Gallops; LUE edema Additional Comments:  Awaiting US LUE. INR is 2.5. Secretions purulent and thick. Duoneb >> albuterol and add mucinex. Await cultures. May get HD today. I have spoken with and examined the patient. I agree with the above assessment and plan as documented.  
 
Cheo James MD

## 2020-03-09 NOTE — DIALYSIS
HD bedside treatment completed without complication. Blood returned, Total of 2.3 kgs removed. Flushed both ports with 10 mL of NS.  VS stable,  ,CVC  dry, and intact, tego caps intact,  Pt. VS stable and pt. will remain in ICU RM 3106. No signs of distress at this time.

## 2020-03-09 NOTE — PROGRESS NOTES
TRANSFER - OUT REPORT: 
 
Verbal report given to Jah Gibbons RN (name) on Juani Macario  being transferred to 23-14-20-09 (unit) for routine progression of care Report consisted of patients Situation, Background, Assessment and  
Recommendations(SBAR). Information from the following report(s) SBAR, Kardex, ED Summary, Procedure Summary, Intake/Output, MAR, Recent Results, Alarm Parameters  and Quality Measures was reviewed with the receiving nurse. Opportunity for questions and clarification was provided. Patient transported with: 
 Monitor O2 @ (NRB at 15L  - Airvo 30L/ FiO2 70%, CPAP prn) liters Registered Nurse

## 2020-03-09 NOTE — PROGRESS NOTES
Warfarin dosing per pharmacist 
 
Jase Mckeon is a 69 y.o. male. Height: 5' 10\" (177.8 cm)   Weight 103.5 kg (228 lbs) Indication:  AVR and afib Goal INR:  2.5 - 3.5 Home dose:  2.5 mg daily Risk factors or significant drug interactions: amiodarone (dc'd 2/5), Levofloxacin (2/13- 2/20 ), mirtazapine (2/13-2/24), escitalopram (2/19-2/20), trazodone (started 2/24) Other anticoagulants:  N/A Daily Monitoring Date  INR     Warfarin dose HGB              Notes 2/14  2.4  3 mg  8.7 2/15  2.2  4 mg  9.6 2/16  2.1  5 mg  9.8 2/17  2.1  5 mg  9.5 2/18  2.8  2 mg  9.3 2/19  2.5  2 mg  8.7 
2/20  1.9  3 mg   8.8 
2/21  1.9  4 mg  8.6 
2/22  2.1  3 mg  8.5 
2/23  1.9  4 mg  --- 
2/24  1.7  3 mg + 2 mg --- 
2/25  1.7  5 mg  --- 
2/26  1.6  6 mg  --- 
2/27  1.7  6 mg   --- 
2/28  1.7  7.5 mg  --- 
2/29  2.1 ` 6 mg  7.7 
3/1  2.0  7.5 mg  --- 
3/2  2.9  6 mg  --- 
3/3  3.4  5 mg  8.0 
3/4  2.7  7.5mg  8.6 
3/5  2.9  6 mg  8.0 
3/6  3.1  Held  8.4 
3/7  2.6  6 mg  8.0 
3/8  2.7  6mg  --- 
3/9  2.5  6 mg  --- 
 
INR is therapeutic today. Patient continues to have labile INRs. Will continue 6 mg daily for now. Further dose adjustments based on INR trend. Pharmacy will continue to follow patient and monitor INR daily. Thank you, Kiesha Harley, PharmD Clinical Pharmacist 
[phone]

## 2020-03-09 NOTE — PROGRESS NOTES
Renal Progress Note Admission Date: 2/13/2020 Subjective:  
  
SOB ++ bipap,  
 
Objective:  
 
Physical Exam:   
Patient Vitals for the past 8 hrs: 
 BP Temp Pulse Resp SpO2 Weight 03/09/20 1002 120/74  84 (!) 34 97 %   
03/09/20 0903 104/51  81 (!) 38 97 %   
03/09/20 0834 116/55 98 °F (36.7 °C) 79 (!) 31 99 %   
03/09/20 0718     94 %   
03/09/20 0702 118/63  79 29 99 %   
03/09/20 0607   80 22    
03/09/20 0605 126/58  81 (!) 35 100 %   
03/09/20 0552      116 kg (255 lb 11.7 oz) 03/09/20 0527   81 27    
03/09/20 0526 107/59    98 %   
03/09/20 0402 131/62  79 23 97 %   
03/09/20 0302 135/62 99 °F (37.2 °C)   98 %   
03/09/20 0301   81 22   Gen: SOB  
CV: S1, S2 
Lungs: Coarse bilaterally, wheezing Extem: trace+ edema Current Facility-Administered Medications Medication Dose Route Frequency  HYDROcodone-homatropine (HYCODAN) 5-1.5 mg/5 mL (5 mL) syrup 5 mL  5 mL Oral Q4H PRN  
 benzonatate (TESSALON) capsule 100 mg  100 mg Oral TID PRN  
 guaiFENesin ER (MUCINEX) tablet 1,200 mg  1,200 mg Oral Q12H  
 albuterol (PROVENTIL VENTOLIN) nebulizer solution 2.5 mg  2.5 mg Nebulization Q6H RT  
 vancomycin (VANCOCIN) 1250 mg in  ml infusion  1,250 mg IntraVENous Q24H  
 sodium chloride (OCEAN) 0.65 % nasal squeeze bottle 2 Spray  2 Spray Both Nostrils Q2H PRN  
 morphine injection 2 mg  2 mg IntraVENous Q6H PRN  
 cefepime (MAXIPIME) 2 g in 0.9% sodium chloride (MBP/ADV) 100 mL  2 g IntraVENous Q12H  
 sodium chloride 3% hypertonic nebulizer soln  4 mL Nebulization BID RT  
 warfarin (COUMADIN) tablet 6 mg  6 mg Oral QPM  
 sodium chloride (OCEAN) 0.65 % nasal squeeze bottle 2 Spray  2 Spray Both Nostrils BID  [Held by provider] insulin lispro (HUMALOG) injection 8 Units  8 Units SubCUTAneous TIDAC  epoetin maritza-epbx (RETACRIT) 14,000 Units combo injection  14,000 Units SubCUTAneous Q7D  
  [Held by provider] insulin glargine (LANTUS) injection 45 Units  45 Units SubCUTAneous QHS  insulin lispro (HUMALOG) injection   SubCUTAneous AC&HS  polyethylene glycol (MIRALAX) packet 17 g  17 g Oral DAILY  clonazePAM (KlonoPIN) tablet 0.5 mg  0.5 mg Oral QHS PRN  
 traZODone (DESYREL) tablet 100 mg  100 mg Oral QHS  midodrine (PROAMITINE) tablet 10 mg  10 mg Oral TID WITH MEALS  tamsulosin (FLOMAX) capsule 0.4 mg  0.4 mg Oral QHS  busPIRone (BUSPAR) tablet 10 mg  10 mg Oral TID  ALPRAZolam (XANAX) tablet 0.5 mg  0.5 mg Oral QID PRN  
 sodium chloride (NS) flush 5-40 mL  5-40 mL IntraVENous Q8H  
 loperamide (IMODIUM) capsule 4 mg  4 mg Oral QID PRN  pantothenic ac-min oil-pet,hyd (AQUAPHOR) 41 % ointment   Topical DAILY  traMADol (ULTRAM) tablet 50 mg  50 mg Oral Q6H PRN  
 sodium chloride (NS) flush 5-40 mL  5-40 mL IntraVENous Q8H  
 sodium chloride (NS) flush 5-40 mL  5-40 mL IntraVENous PRN  
 acetaminophen (TYLENOL) tablet 650 mg  650 mg Oral Q4H PRN  
 naloxone (NARCAN) injection 0.4 mg  0.4 mg IntraVENous PRN  
 ondansetron (ZOFRAN) injection 4 mg  4 mg IntraVENous Q4H PRN  
 bisacodyL (DULCOLAX) tablet 5 mg  5 mg Oral DAILY PRN Data Review:  
 
LABS:  
Recent Results (from the past 12 hour(s)) PROTHROMBIN TIME + INR Collection Time: 03/09/20  3:21 AM  
Result Value Ref Range Prothrombin time 27.7 (H) 12.0 - 14.7 sec INR 2.5 GLUCOSE, POC Collection Time: 03/09/20  8:41 AM  
Result Value Ref Range Glucose (POC) 241 (H) 65 - 100 mg/dL Plan: Active Problems: 
  DM type 2 (diabetes mellitus, type 2) ([facility] (1/14/2013) HLD (hyperlipidemia) (1/14/2013) Obesity, morbid ([facility] (10/19/2018) Type 2 diabetes with nephropathy ([facility] (8/26/2019) CAD (coronary artery disease) (1/10/2020) S/P CABG x 3 (1/10/2020) S/P AVR (1/10/2020) Atrial fibrillation ([facility] (1/13/2020) HIT (heparin-induced thrombocytopenia) ([facility] (1/23/2020) Pneumonia due to infectious organism (2/14/2020) Hypotension (2/18/2020) Acute renal failure on dialysis ([facility] (2/25/2020) DNR (do not resuscitate) (2/26/2020) Acute on chronic respiratory failure with hypoxia ([facility] (3/8/2020) DEJUAN/CKD 
- ([facility] TTS schedule) May be fluid overload with SOB 
HD again today  
  
HIT/SHARON + 
  
ASHD s/p CABG 
  
Orthostatic Hypotension - on midodrine 
  
 Anemia - on IV Fe replacement and shleton

## 2020-03-09 NOTE — PROGRESS NOTES
TRANSFER - IN REPORT: 
 
Verbal report received from Nathan(name) on Cally Gandhi  being received from ICU(unit) for routine progression of care Report consisted of patients Situation, Background, Assessment and  
Recommendations(SBAR). Information from the following report(s) SBAR was reviewed with the receiving nurse. Opportunity for questions and clarification was provided.

## 2020-03-09 NOTE — DIALYSIS
Hemodialysis treatment initiated in ICU RM 3106 using CVC. Aspirated and flushed both ports without problem. Dressing , dry and intact. Pt. C/O of SOB and insist to put his O2 mask on,  Pt alert and VS stable,  .O2 Stat 99%, well perfusion. Machine settings per MD order. Will monitor during treatment.

## 2020-03-09 NOTE — PROGRESS NOTES
Patient request ice pack for LUE. This RN provided ice pack. Patient awake resting in bed. Respirations present. No signs of distress. Bedside report given to oncoming RN, Stewart Mendez.

## 2020-03-10 NOTE — PROGRESS NOTES
Renal Progress Note Admission Date: 2/13/2020 Subjective:  
Patient seen and examined on HD, dialyzing via left  Qb, UF  4100, on AirVo + shortness of breath, tolerating UF on dialysis. Left wrist swelling recent peripheral IV removed Labs and chart reviewed ROS: 
General: no fever/ chills, appetite ok Lung: + SOB 
CV: no CP 
GI: no N/V/D Ext: right wrist edema Objective:  
 
Physical Exam:   
Patient Vitals for the past 8 hrs: 
 BP Temp Pulse Resp SpO2 Weight 03/10/20 1230 119/59  76     
03/10/20 1203 108/62  82     
03/10/20 1200   74     
03/10/20 1130 (!) 113/93  76     
03/10/20 1058 120/59  79     
03/10/20 1027 110/55  80     
03/10/20 0801     98 %   
03/10/20 0800   72     
03/10/20 0741 107/60 98 °F (36.7 °C) 79 20 98 %   
03/10/20 0643     95 %   
03/10/20 0641      115.7 kg (255 lb)  
03/10/20 0618     94 %  Gen: alert and oriented, NAD HEENT: MMM 
CV: regular rate, S1, S2, no Rub Lungs: Coarse bilaterally with expiratory wheezing, low lung volumes Abd: soft, non tender, + BS Extem: no LE edema, right wrist trace edema, left foot necrotic toes Access: Left TCC- intact Current Facility-Administered Medications Medication Dose Route Frequency  HYDROcodone-homatropine (HYCODAN) 5-1.5 mg/5 mL (5 mL) syrup 5 mL  5 mL Oral Q4H PRN  
 benzonatate (TESSALON) capsule 100 mg  100 mg Oral TID PRN  
 guaiFENesin ER (MUCINEX) tablet 1,200 mg  1,200 mg Oral Q12H  
 albuterol (PROVENTIL VENTOLIN) nebulizer solution 2.5 mg  2.5 mg Nebulization Q6H RT  
 insulin glargine (LANTUS) injection 5 Units  5 Units SubCUTAneous QHS  mirtazapine (REMERON) tablet 7.5 mg  7.5 mg Oral QHS  sertraline (ZOLOFT) tablet 25 mg  25 mg Oral DAILY  ALPRAZolam (XANAX) tablet 0.25 mg  0.25 mg Oral Q6H PRN  
 busPIRone (BUSPAR) tablet 5 mg  5 mg Oral TID  vancomycin (VANCOCIN) 1250 mg in  ml infusion  1,250 mg IntraVENous Q24H  sodium chloride (OCEAN) 0.65 % nasal squeeze bottle 2 Spray  2 Spray Both Nostrils Q2H PRN  
 morphine injection 2 mg  2 mg IntraVENous Q6H PRN  
 cefepime (MAXIPIME) 2 g in 0.9% sodium chloride (MBP/ADV) 100 mL  2 g IntraVENous Q12H  
 sodium chloride 3% hypertonic nebulizer soln  4 mL Nebulization BID RT  
 warfarin (COUMADIN) tablet 6 mg  6 mg Oral QPM  
 sodium chloride (OCEAN) 0.65 % nasal squeeze bottle 2 Spray  2 Spray Both Nostrils BID  epoetin maritza-epbx (RETACRIT) 14,000 Units combo injection  14,000 Units SubCUTAneous Q7D  
 insulin lispro (HUMALOG) injection   SubCUTAneous AC&HS  polyethylene glycol (MIRALAX) packet 17 g  17 g Oral DAILY  midodrine (PROAMITINE) tablet 10 mg  10 mg Oral TID WITH MEALS  tamsulosin (FLOMAX) capsule 0.4 mg  0.4 mg Oral QHS  loperamide (IMODIUM) capsule 4 mg  4 mg Oral QID PRN  pantothenic ac-min oil-pet,hyd (AQUAPHOR) 41 % ointment   Topical DAILY  traMADol (ULTRAM) tablet 50 mg  50 mg Oral Q6H PRN  
 sodium chloride (NS) flush 5-40 mL  5-40 mL IntraVENous PRN  
 acetaminophen (TYLENOL) tablet 650 mg  650 mg Oral Q4H PRN  
 naloxone (NARCAN) injection 0.4 mg  0.4 mg IntraVENous PRN  
 ondansetron (ZOFRAN) injection 4 mg  4 mg IntraVENous Q4H PRN  
 bisacodyL (DULCOLAX) tablet 5 mg  5 mg Oral DAILY PRN Data Review:  
 
LABS:  
Recent Results (from the past 12 hour(s)) GLUCOSE, POC Collection Time: 03/10/20  5:24 AM  
Result Value Ref Range Glucose (POC) 166 (H) 65 - 100 mg/dL PROTHROMBIN TIME + INR Collection Time: 03/10/20  7:30 AM  
Result Value Ref Range Prothrombin time 29.9 (H) 12.0 - 14.7 sec INR 2.7    
CBC W/O DIFF Collection Time: 03/10/20  7:30 AM  
Result Value Ref Range WBC 7.2 4.3 - 11.1 K/uL  
 RBC 2.64 (L) 4.23 - 5.6 M/uL HGB 7.6 (L) 13.6 - 17.2 g/dL HCT 25.2 (L) 41.1 - 50.3 % MCV 95.5 79.6 - 97.8 FL  
 MCH 28.8 26.1 - 32.9 PG  
 MCHC 30.2 (L) 31.4 - 35.0 g/dL RDW 15.2 (H) 11.9 - 14.6 % PLATELET 905 335 - 762 K/uL MPV 9.6 9.4 - 12.3 FL ABSOLUTE NRBC 0.00 0.0 - 0.2 K/uL METABOLIC PANEL, BASIC Collection Time: 03/10/20  7:30 AM  
Result Value Ref Range Sodium 138 136 - 145 mmol/L Potassium 4.3 3.5 - 5.1 mmol/L Chloride 104 98 - 107 mmol/L  
 CO2 31 21 - 32 mmol/L Anion gap 3 (L) 7 - 16 mmol/L Glucose 140 (H) 65 - 100 mg/dL BUN 18 8 - 23 MG/DL Creatinine 1.79 (H) 0.8 - 1.5 MG/DL  
 GFR est AA 48 (L) >60 ml/min/1.73m2 GFR est non-AA 40 (L) >60 ml/min/1.73m2 Calcium 7.8 (L) 8.3 - 10.4 MG/DL  
TSH 3RD GENERATION Collection Time: 03/10/20  7:30 AM  
Result Value Ref Range TSH 0.808 0.358 - 3.740 uIU/mL VITAMIN B12 Collection Time: 03/10/20  7:30 AM  
Result Value Ref Range Vitamin B12 828 193 - 986 pg/mL FOLATE Collection Time: 03/10/20  7:30 AM  
Result Value Ref Range Folate 14.8 3.1 - 17.5 ng/mL GLUCOSE, POC Collection Time: 03/10/20 10:38 AM  
Result Value Ref Range Glucose (POC) 190 (H) 65 - 100 mg/dL Plan: Active Problems: 
  DM type 2 (diabetes mellitus, type 2) ([facility] (1/14/2013) HLD (hyperlipidemia) (1/14/2013) Obesity, morbid ([facility] (10/19/2018) Type 2 diabetes with nephropathy ([facility] (8/26/2019) CAD (coronary artery disease) (1/10/2020) S/P CABG x 3 (1/10/2020) S/P AVR (1/10/2020) Atrial fibrillation ([facility] (1/13/2020) HIT (heparin-induced thrombocytopenia) ([facility] (1/23/2020) Pneumonia due to infectious organism (2/14/2020) Hypotension (2/18/2020) Acute renal failure on dialysis ([facility] (2/25/2020) DNR (do not resuscitate) (2/26/2020) Acute on chronic respiratory failure with hypoxia ([facility] (3/8/2020) DEJUAN/CKD 
- ([place] [facility] schedule) -monitoring for renal recovery 
-seen on HD, tolerating UF, catheter functioning well  
  
HIT/SHARON + 
  
ASHD s/p CABG 
  
Orthostatic Hypotension - on midodrine 
  
 Anemia - s/p IV Fe replacement and shelton Acute respiratory failure,  UF as tolerated - pulmonary following to Ingrid left thoracic space for ? effusion, possible thoracentesis 
-on cefepime and vancomycin for HCAP per ID

## 2020-03-10 NOTE — PROGRESS NOTES
Hospitalist Note Admit Date:  2020  3:03 PM  
Name:  Jovani Hess Age:  69 y.o. 
:  1945 MRN:  [id number] PCP:  Kevin Brooks MD 
Treatment Team: Attending Provider: Anca Ashton MD; Consulting Provider: Clara Rice MD; Utilization Review: Cory Quiñones RN; Hospitalist: Austin Kussmaul, NP; Hospitalist: Johnie Jean NP; Consulting Provider: Deborah Yin MD; Consulting Provider: Alois Dakins, MD; Care Manager: Jemima Dubon; Utilization Review: Todd Rebollar; Consulting Provider: Meghan Smith MD; Consulting Provider: Rosa Isela Lira MD; Consulting Provider: Zahira Macario MD; Primary Nurse: Dontae Wahl; Occupational Therapist: Anjum Vázquez HPI/Subjective:  
Patient is seen and examined at bedside. No acute events reported overnight by nursing staff. Reports feeling well. Still having some cough. Currently on RA and saturating well. Objective:  
 
Patient Vitals for the past 24 hrs: 
 Temp Pulse Resp BP SpO2  
03/10/20 1600  80     
03/10/20 1543 97.5 °F (36.4 °C) 78 20 128/57 92 % 03/10/20 1535    127/60 92 % 03/10/20 1451     95 % 03/10/20 1349 97.5 °F (36.4 °C) 80 18 122/70 94 % 03/10/20 1328  76  108/46   
03/10/20 1304  78  106/51   
03/10/20 1230  76  119/59   
03/10/20 1203  82  108/62   
03/10/20 1200  74     
03/10/20 1130  76  (!) 113/93   
03/10/20 1058  79  120/59   
03/10/20 1027  80  110/55   
03/10/20 0801     98 % 03/10/20 0800  72     
03/10/20 0741 98 °F (36.7 °C) 79 20 107/60 98 % 03/10/20 0643     95 % 03/10/20 0618     94 % 03/10/20 0419     100 % 03/10/20 0308 98.4 °F (36.9 °C) 68 20 115/78 100 % 03/10/20 0121     99 % 20 2319 97.9 °F (36.6 °C) 78 20 116/62 100 % 20 2104     94 % 20     99 % 20 1950 98 °F (36.7 °C) 86 20 114/64 99 % Oxygen Therapy O2 Sat (%): 92 % (03/10/20 1543) Pulse via Oximetry: 79 beats per minute (03/10/20 1451) O2 Device: Hi flow nasal cannula (03/10/20 1451) O2 Flow Rate (L/min): 4 l/min (03/10/20 1451) O2 Temperature: 87.8 °F (31 °C) (03/10/20 0618) FIO2 (%): 55 % (03/10/20 0801) Estimated body mass index is 36.59 kg/m² as calculated from the following: 
  Height as of this encounter: 5' 10\" (1.778 m). Weight as of this encounter: 115.7 kg (255 lb). Intake/Output Summary (Last 24 hours) at 3/10/2020 1840 Last data filed at 3/10/2020 1821 Gross per 24 hour Intake 600 ml Output 4825 ml Net -4225 ml *Note that automatically entered I/Os may not be accurate; dependent on patient compliance with collection and accurate  by techs. Physical Exam:  
General:     alert, awake, no acute distress. Obese. Head:   normocephalic, atraumatic Eyes, Ears, nose: PERRL, EOMI. NC Neck:    supple, non-tender Lungs:   Wheezing Cardiac:   RRR, Normal S1 and S2. Abdomen:   Soft, non distended, nontender, +BS Extremities:   Warm, dry. B/l stasis dermatitis with mild edema. Left forearm and hand swelling Skin:   No rashes, no jaundice Neuro:  Alert and oriented to person, time, place, situation. No gross focal neurological deficit Psychiatric:  No anxiety, calm, cooperative Data Review: 
I have reviewed all labs, meds, and studies from the last 24 hours: 
 
Recent Results (from the past 24 hour(s)) GLUCOSE, POC Collection Time: 03/09/20  9:27 PM  
Result Value Ref Range Glucose (POC) 195 (H) 65 - 100 mg/dL GLUCOSE, POC Collection Time: 03/10/20  5:24 AM  
Result Value Ref Range Glucose (POC) 166 (H) 65 - 100 mg/dL PROTHROMBIN TIME + INR Collection Time: 03/10/20  7:30 AM  
Result Value Ref Range Prothrombin time 29.9 (H) 12.0 - 14.7 sec INR 2.7    
CBC W/O DIFF Collection Time: 03/10/20  7:30 AM  
Result Value Ref Range WBC 7.2 4.3 - 11.1 K/uL RBC 2.64 (L) 4.23 - 5.6 M/uL HGB 7.6 (L) 13.6 - 17.2 g/dL HCT 25.2 (L) 41.1 - 50.3 % MCV 95.5 79.6 - 97.8 FL  
 MCH 28.8 26.1 - 32.9 PG  
 MCHC 30.2 (L) 31.4 - 35.0 g/dL  
 RDW 15.2 (H) 11.9 - 14.6 % PLATELET 798 488 - 361 K/uL MPV 9.6 9.4 - 12.3 FL ABSOLUTE NRBC 0.00 0.0 - 0.2 K/uL METABOLIC PANEL, BASIC Collection Time: 03/10/20  7:30 AM  
Result Value Ref Range Sodium 138 136 - 145 mmol/L Potassium 4.3 3.5 - 5.1 mmol/L Chloride 104 98 - 107 mmol/L  
 CO2 31 21 - 32 mmol/L Anion gap 3 (L) 7 - 16 mmol/L Glucose 140 (H) 65 - 100 mg/dL BUN 18 8 - 23 MG/DL Creatinine 1.79 (H) 0.8 - 1.5 MG/DL  
 GFR est AA 48 (L) >60 ml/min/1.73m2 GFR est non-AA 40 (L) >60 ml/min/1.73m2 Calcium 7.8 (L) 8.3 - 10.4 MG/DL  
TSH 3RD GENERATION Collection Time: 03/10/20  7:30 AM  
Result Value Ref Range TSH 0.808 0.358 - 3.740 uIU/mL VITAMIN B12 Collection Time: 03/10/20  7:30 AM  
Result Value Ref Range Vitamin B12 828 193 - 986 pg/mL FOLATE Collection Time: 03/10/20  7:30 AM  
Result Value Ref Range Folate 14.8 3.1 - 17.5 ng/mL GLUCOSE, POC Collection Time: 03/10/20 10:38 AM  
Result Value Ref Range Glucose (POC) 190 (H) 65 - 100 mg/dL GLUCOSE, POC Collection Time: 03/10/20  4:39 PM  
Result Value Ref Range Glucose (POC) 214 (H) 65 - 100 mg/dL All Micro Results Procedure Component Value Units Date/Time CULTURE, BLOOD [[id number]] Collected:  03/06/20 4285 Order Status:  Completed Specimen:  Blood Updated:  03/10/20 1203 Special Requests: --     
  LEFT Antecubital 
  
  Culture result: NO GROWTH 4 DAYS     
 CULTURE, BLOOD [[id number]] Collected:  03/06/20 0944 Order Status:  Completed Specimen:  Blood Updated:  03/10/20 1203 Special Requests: --     
  RIGHT Antecubital 
  
  Culture result: NO GROWTH 4 DAYS     
 CULTURE, RESPIRATORY/SPUTUM/BRONCH Margrette Rival STAIN [[id number]] Collected:  03/09/20 0082 Order Status:  Completed Specimen:  Sputum Updated:  03/10/20 1027 Special Requests: NO SPECIAL REQUESTS     
  GRAM STAIN 10 TO 50 WBC'S/OIF  
   0 TO 2 EPITHELIAL CELLS/OIF  
   FEW GRAM POSITIVE RODS MODERATE YEAST 4+ MUCUS PRESENT Culture result: MODERATE YEAST SUBCULTURE IN PROGRESS  
     
 CULTURE, RESPIRATORY/SPUTUM/BRONCH Grayce Canton [[id number]] Collected:  03/06/20 1600 Order Status:  Canceled Specimen:  Sputum C. DIFFICILE AG & TOXIN A/B [[id number]] Order Status:  Canceled Specimen:  Stool CULTURE, RESPIRATORY/SPUTUM/BRONCH Grayce [place]on [[id number]] Collected:  02/17/20 1220 Order Status:  Completed Specimen:  Sputum from Bronchial Washing Updated:  02/24/20 1244 Special Requests: NO SPECIAL REQUESTS     
  GRAM STAIN 20 TO 35 WBCS SEEN PER OIF  
   1 TO 2 EPITHELIAL CELLS SEEN PER OIF  
      
  MODERATE GRAM POSITIVE COCCI  
     
   FEW GRAM POSITIVE RODS Culture result:    
  LIGHT NORMAL RESPIRATORY BRENNAN Current Meds: 
Current Facility-Administered Medications Medication Dose Route Frequency  HYDROcodone-homatropine (HYCODAN) 5-1.5 mg/5 mL (5 mL) syrup 5 mL  5 mL Oral Q4H PRN  
 benzonatate (TESSALON) capsule 100 mg  100 mg Oral TID PRN  
 guaiFENesin ER (MUCINEX) tablet 1,200 mg  1,200 mg Oral Q12H  
 albuterol (PROVENTIL VENTOLIN) nebulizer solution 2.5 mg  2.5 mg Nebulization Q6H RT  
 insulin glargine (LANTUS) injection 5 Units  5 Units SubCUTAneous QHS  mirtazapine (REMERON) tablet 7.5 mg  7.5 mg Oral QHS  sertraline (ZOLOFT) tablet 25 mg  25 mg Oral DAILY  ALPRAZolam (XANAX) tablet 0.25 mg  0.25 mg Oral Q6H PRN  
 busPIRone (BUSPAR) tablet 5 mg  5 mg Oral TID  vancomycin (VANCOCIN) 1250 mg in  ml infusion  1,250 mg IntraVENous Q24H  
 sodium chloride (OCEAN) 0.65 % nasal squeeze bottle 2 Spray  2 Spray Both Nostrils Q2H PRN  
 morphine injection 2 mg  2 mg IntraVENous Q6H PRN  
  cefepime (MAXIPIME) 2 g in 0.9% sodium chloride (MBP/ADV) 100 mL  2 g IntraVENous Q12H  
 sodium chloride 3% hypertonic nebulizer soln  4 mL Nebulization BID RT  
 warfarin (COUMADIN) tablet 6 mg  6 mg Oral QPM  
 sodium chloride (OCEAN) 0.65 % nasal squeeze bottle 2 Spray  2 Spray Both Nostrils BID  epoetin maritza-epbx (RETACRIT) 14,000 Units combo injection  14,000 Units SubCUTAneous Q7D  
 insulin lispro (HUMALOG) injection   SubCUTAneous AC&HS  polyethylene glycol (MIRALAX) packet 17 g  17 g Oral DAILY  midodrine (PROAMITINE) tablet 10 mg  10 mg Oral TID WITH MEALS  tamsulosin (FLOMAX) capsule 0.4 mg  0.4 mg Oral QHS  loperamide (IMODIUM) capsule 4 mg  4 mg Oral QID PRN  pantothenic ac-min oil-pet,hyd (AQUAPHOR) 41 % ointment   Topical DAILY  traMADol (ULTRAM) tablet 50 mg  50 mg Oral Q6H PRN  
 sodium chloride (NS) flush 5-40 mL  5-40 mL IntraVENous PRN  
 acetaminophen (TYLENOL) tablet 650 mg  650 mg Oral Q4H PRN  
 naloxone (NARCAN) injection 0.4 mg  0.4 mg IntraVENous PRN  
 ondansetron (ZOFRAN) injection 4 mg  4 mg IntraVENous Q4H PRN  
 bisacodyL (DULCOLAX) tablet 5 mg  5 mg Oral DAILY PRN Other Studies: 
 
Ct Chest W Cont Result Date: 2/14/2020 CTA OF THE CHEST - PE STUDY HISTORY: Acute shortness of breath COMPARISON: None TECHNIQUE: A helical acquisition was performed through the chest utilizing 4.03IP slice thickness during the infusion of 100 cc of Isovue-370. 3-D post-processed images were created on an independent workstation. Multiplanar reformats were obtained. The exam was focused on the pulmonary arteries. Dose reduction techniques used: Automated exposure control, adjustment of the mAs and/or kVp according to patient's size, and iterative reconstruction techniques. FINDINGS: *  PULMONARY VESSELS: No evidence of pulmonary embolism. *  PLEURA / PERICARDIUM: Left pleural effusion. *  CAROL / MEDIASTINUM: Dilated diffuse fluid-filled esophagus. No evidence of a hiatal hernia. *  LUNGS: Consolidation of the entire left lower lobe. Linear atelectasis of the right middle and right upper lobe. *  TRACHEOBRONCHIAL TREE: Within normal limits. *  AORTA: Within normal limits. *  CORONARY ARTERIES: Extensive coronary artery calcification is seen. *  CHEST WALL/AXILLA: Within normal limits. *  VISUALIZED UPPER ABDOMEN: Within normal limits. *  SPINE / BONES: Status post CABG. *  ADDITIONAL COMMENTS: None. IMPRESSION: No evidence of pulmonary embolism. Left lower lobe atelectasis or pneumonia with a left pleural effusion. Linear atelectasis of the right middle and right upper lobes. Dilated diffuse fluid-filled esophagus. No evidence of a hiatal hernia or obstructing mass. I question of the patient could have gastroesophageal reflux disease. Coronary artery disease status post CABG. Date of Dictation: 2/14/2020 12:25 AM 
 
Xr Chest [place] Result Date: 2/13/2020 EXAMINATION: CHEST RADIOGRAPH 2/13/2020 3:55 PM ACCESSION NUMBER: [id number] INDICATION: shob COMPARISON: Chest x-ray 2/8/2020 TECHNIQUE: A single AP view of the chest was obtained. FINDINGS: Support Lines and Tubes: Unchanged cardiac pacemaker positioning. Unchanged left central venous catheter positioning. Cardiac Silhouette: Unchanged enlargement of the cardiac silhouette. Lungs: Unchanged left basilar opacity, moderate left pleural effusion. Improving interstitial edema. Pleura: No pneumothorax. Osseous Structures: Prior median sternotomy. Upper Abdomen: Unremarkable. IMPRESSION: 1. Improving interstitial edema. 2. Unchanged left basilar opacity and left pleural effusion. 3. Unchanged enlargement of the cardiac silhouette. VOICE DICTATED BY: Dr. Nayeli Barrios Assessment and Plan:  
 
Hospital Problems as of 3/10/2020 Date Reviewed: 3/9/2020 Codes Class Noted - Resolved POA  Acute on chronic respiratory failure with hypoxia (HCC) ICD-10-CM: X93.25 
 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 
ICD-9-CM: 250.40, 583.81  8/26/2019 - Present Yes Obesity, morbid ([address] (Chronic) ICD-10-CM: E66.01 
ICD-9-CM: 278.01  10/19/2018 - Present Yes DM type 2 (diabetes mellitus, type 2) (HCC) (Chronic) ICD-10-CM: E11.9 ICD-9-CM: 250.00  1/14/2013 - Present Yes HLD (hyperlipidemia) (Chronic) ICD-10-CM: O64.1 ICD-9-CM: 272.4  1/14/2013 - Present Yes RESOLVED: Acute-on-chronic kidney injury ([facility] [address] ICD-10-CM: N17.9, N18.9 ICD-9-CM: 584.9, 585.9  2/17/2020 - 2/25/2020 Yes RESOLVED: Atelectasis ICD-10-CM: J98.11 ICD-9-CM: 518.0  2/17/2020 - 2/25/2020 Yes RESOLVED: Fluid overload ICD-10-CM: E87.70 ICD-9-CM: 276.69  2/15/2020 - 2/25/2020 Yes RESOLVED: Pleural effusion ICD-10-CM: J90 ICD-9-CM: 511.9  2/3/2020 - 2/25/2020 Yes  * (Principal) RESOLVED: Acute hypoxemic respiratory failure (HCC) ICD-10-CM: J96.01 
 ICD-9-CM: 518.81  1/10/2020 - 2/25/2020 Yes RESOLVED: HTN (hypertension) ICD-10-CM: I10 
ICD-9-CM: 401.9  1/14/2013 - 2/25/2020 Yes Plan: 
Acute hypoxemic respiratory failure with Progressive LLL consolidation - ID on board: back on abx, vanc/cefepime.  
- S/p bronch 2/17. Pulm following. 
  
LUE pain 
- US : no DVT but Thrombosed superficial branch of the left cephalic vein. DEJUAN now on HD 
- HD per nephro 
  
DM2 
- sliding scale 
- holding Basal/prandial insulins with sugars reasonable though occasional spikes throughout the day 
  
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 
- Warfarin 
  
CAD S/p CABG/AVR January 2020 AFib 
- Home meds. 
  
MDD - Per tele psych 
 - Decrease Buspar to 5mg TID x3 days (or 9 doses) then stop 
 - trazodone dc(suspected contributing to orthostatic hypotension?) - Start Remeron 7.5mg PO qhs; start Zoloft 25mg daily - DC Klonopin - Decrease Xanax to 0.25mg q6 prn Diet:  DIET DIABETIC CONSISTENT CARB 
DIET NUTRITIONAL SUPPLEMENTS 
DVT PPx: warfarin Code: DNR Signed By: Joanna Hathaway MD   
 March 10, 2020

## 2020-03-10 NOTE — PROGRESS NOTES
03/09/20 2104 Oxygen Therapy O2 Sat (%) 94 % Pulse via Oximetry 73 beats per minute O2 Device CPAP mask FIO2 (%) 60 % Respiratory Respiratory (WDL) X Respiratory Pattern Tachypneic;Dyspnea with exertion Chest/Tracheal Assessment Chest expansion, symmetrical  
Breath Sounds Bilateral Coarse Cough Congested CPAP/BIPAP  
CPAP/BIPAP Start/Stop On Device Mode CPAP Mask Type and Size Full face; Large Skin Condition Intact PIP Observed 14 cm H20  
EPAP (cm H2O) 12 cm H2O Vt Spont (ml) 480 ml Ve Observed (l/min) 11.5 l/min Backup Rate 12 Total RR (Spontaneous) 24 breaths per minute Leak (Estimated) 39 L/min  
Pt's Home Machine No  
Biomedical Check Performed Yes Settings Verified Yes Alarm Settings High Pressure 30 Low Pressure 2 Apnea 20 Low Ve 3 High Rate 50 Low Rate 10 Pulmonary Toilet Pulmonary Toilet H. O.B elevated Patient placed on CPAP per pt request

## 2020-03-10 NOTE — PROGRESS NOTES
Critical Care Daily Progress Note: 3/10/2020 Avani Trujillo Admission Date: 2/13/2020 The patient's chart is reviewed and the patient is discussed with the staff. 68 yo Junior Salcedo a history of CAD s/p CABG with AVR 1/10/20 by Dr. Janel Perez complicated by CKD requiring HD, HIT +, DVTs, wound left thigh and pneumonia.  Was discharged on Levaquin Q 48 hours last dose 2/11/20. [facility] was discharged on NC 2 L but has been increased to NC 4L O2 at [facility]. He presented to the ER on 2/15 with increased SOB. CTA chest was negative for PE but persistent LLL opacity. He had a bronch 2/17 with negative cultures and was treated for PNA using levaquin. He is now ESRD and being followed by nephrology. We were reconsulted on 3/6 for acute respiratory failure requiring bipap. CXR shows increased consolidation in the left base. He is febrile with a temp of 100.5. He has refused a swallow evaluation. ID is following and he was started on Vanc and Cefepime on 3/6. Subjective: He is agitated that he isn't getting to wear BiPAP prn on floor. Currently 98% on optiflow 55%. 1500ml UOP yesterday documented with 1L net negative fluid balance. No leukocytosis, no fever LUE duplex suggested no DVT but Thrombosed superficial branch of the left cephalic vein. Current Facility-Administered Medications Medication Dose Route Frequency  HYDROcodone-homatropine (HYCODAN) 5-1.5 mg/5 mL (5 mL) syrup 5 mL  5 mL Oral Q4H PRN  
 benzonatate (TESSALON) capsule 100 mg  100 mg Oral TID PRN  
 guaiFENesin ER (MUCINEX) tablet 1,200 mg  1,200 mg Oral Q12H  
 albuterol (PROVENTIL VENTOLIN) nebulizer solution 2.5 mg  2.5 mg Nebulization Q6H RT  
 insulin glargine (LANTUS) injection 5 Units  5 Units SubCUTAneous QHS  mirtazapine (REMERON) tablet 7.5 mg  7.5 mg Oral QHS  sertraline (ZOLOFT) tablet 25 mg  25 mg Oral DAILY  ALPRAZolam (XANAX) tablet 0.25 mg  0.25 mg Oral Q6H PRN  
  busPIRone (BUSPAR) tablet 5 mg  5 mg Oral TID  vancomycin (VANCOCIN) 1250 mg in  ml infusion  1,250 mg IntraVENous Q24H  
 sodium chloride (OCEAN) 0.65 % nasal squeeze bottle 2 Spray  2 Spray Both Nostrils Q2H PRN  
 morphine injection 2 mg  2 mg IntraVENous Q6H PRN  
 cefepime (MAXIPIME) 2 g in 0.9% sodium chloride (MBP/ADV) 100 mL  2 g IntraVENous Q12H  
 sodium chloride 3% hypertonic nebulizer soln  4 mL Nebulization BID RT  
 warfarin (COUMADIN) tablet 6 mg  6 mg Oral QPM  
 sodium chloride (OCEAN) 0.65 % nasal squeeze bottle 2 Spray  2 Spray Both Nostrils BID  epoetin maritza-epbx (RETACRIT) 14,000 Units combo injection  14,000 Units SubCUTAneous Q7D  
 insulin lispro (HUMALOG) injection   SubCUTAneous AC&HS  polyethylene glycol (MIRALAX) packet 17 g  17 g Oral DAILY  midodrine (PROAMITINE) tablet 10 mg  10 mg Oral TID WITH MEALS  tamsulosin (FLOMAX) capsule 0.4 mg  0.4 mg Oral QHS  loperamide (IMODIUM) capsule 4 mg  4 mg Oral QID PRN  pantothenic ac-min oil-pet,hyd (AQUAPHOR) 41 % ointment   Topical DAILY  traMADol (ULTRAM) tablet 50 mg  50 mg Oral Q6H PRN  
 sodium chloride (NS) flush 5-40 mL  5-40 mL IntraVENous PRN  
 acetaminophen (TYLENOL) tablet 650 mg  650 mg Oral Q4H PRN  
 naloxone (NARCAN) injection 0.4 mg  0.4 mg IntraVENous PRN  
 ondansetron (ZOFRAN) injection 4 mg  4 mg IntraVENous Q4H PRN  
 bisacodyL (DULCOLAX) tablet 5 mg  5 mg Oral DAILY PRN Review of Systems 
+cough-yellow/gray sputum +LUE pain/edema 
+dyspnea Constitutional:  negative for fever, chills, sweats Cardiovascular:  negative for chest pain, palpitations, syncope, edema Gastrointestinal:  negative for dysphagia, reflux, vomiting, diarrhea, abdominal pain, or melena Neurologic:  negative for focal weakness, numbness, headache Objective:  
 
Vitals:  
 03/10/20 0346 03/10/20 0741 03/10/20 0800 03/10/20 0801 BP:  107/60 Pulse:  79 72 Resp:  20    
 Temp:  98 °F (36.7 °C) SpO2: 95% 98%  98% Weight:      
Height:      
 
 
 
Intake/Output Summary (Last 24 hours) at 3/10/2020 0850 Last data filed at 3/10/2020 6070 Gross per 24 hour Intake 450 ml Output 1300 ml Net -850 ml Physical Exam:         
Constitutional:  the patient is well developed and in no acute distress, on optiflow 30L at 72% EENMT:  Sclera clear, pupils equal, oral mucosa moist 
Respiratory: insp wheezing anteriorly Cardiovascular:  RRR without M,G,R 
Gastrointestinal: soft and non-tender; with positive bowel sounds. Musculoskeletal: warm without cyanosis. 2+ LUE edema, 1+ BLE edema Skin:  no jaundice or rashes Neurologic: no gross neuro deficits Psychiatric:  alert and oriented x 3 LINES: 
Peripheral IV L arm, HD access DRIPS: 
none CXR:  
 
 
LAB Recent Labs  
  03/10/20 
0524 03/09/20 
2127 03/09/20 
1712 03/09/20 
1047 03/09/20 
8854 GLUCPOC 166* 195* 177* 268* 241* Recent Labs  
  03/10/20 
0730 03/09/20 
0321 03/08/20 
1016 03/08/20 
0321 WBC 7.2  --   --  6.9 HGB 7.6*  --   --  7.9*  
HCT 25.2*  --   --  26.3*  
  --   --  202 INR 2.7 2.5 2.7  --   
 
Recent Labs 03/08/20 
0321   
K 4.5  
 CO2 33* * BUN 18 CREA 1.88* CA 8.0* No results for input(s): PH, PCO2, PO2, HCO3, PHI, PCO2I, PO2I, HCO3I in the last 72 hours. No results for input(s): LCAD, LAC in the last 72 hours. No results for input(s): PH, PCO2, PO2, HCO3, PHI, PCO2I, PO2I, HCO3I in the last 72 hours. Patient Active Problem List  
Diagnosis Code  DM type 2 (diabetes mellitus, type 2) ([facility] [address] E11.9  
 Gout M10.9  BPH (benign prostatic hyperplasia) N40.0  Seasonal allergic rhinitis J30.2  
 HLD (hyperlipidemia) E78.5  Nonrheumatic aortic valve stenosis I35.0  Obesity, morbid ([address] E66.01  
 Type 2 diabetes with nephropathy (HCC) E11.21  
 Bilateral carotid artery stenosis I65.23  
 Aortic valve stenosis I35.0  CAD (coronary artery disease) I25.10  
 S/P CABG x 3 Z95.1  
 S/P AVR Z95.2  Atrial fibrillation (HCC) I48.91  
 HIT (heparin-induced thrombocytopenia) ([facility]) D75.82  
 Pneumonia due to infectious organism J18.9  Hypotension I95.9  Acute renal failure on dialysis (HCC) N17.9, Z99.2  DNR (do not resuscitate) [id number]  Acute on chronic respiratory failure with hypoxia ([facility]) J96.21 Assessment:  (Medical Decision Making) Hospital Problems  Date Reviewed: 3/9/2020 Codes Class Noted POA Acute on chronic respiratory failure with hypoxia [facility]) ICD-10-CM: J96.21 
ICD-9-CM: 518.84, 799.02  3/8/2020 Unknown Wean FiO2 as tolerated DNR (do not resuscitate) (Chronic) ICD-10-CM: K01 ICD-9-CM: V49.86  2/26/2020 Yes Pt's request   
 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 
ICD-9-CM: 427.31  1/13/2020 Yes Paced now. CAD (coronary artery disease) (Chronic) ICD-10-CM: I25.10 ICD-9-CM: 414.00  1/10/2020 Yes S/p CABG   
 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 
ICD-9-CM: 250.40, 583.81  8/26/2019 Yes SSI Obesity, morbid ([address] (Chronic) ICD-10-CM: E66.01 
ICD-9-CM: 278.01  10/19/2018 Yes  
 BMI > 40 HLD (hyperlipidemia) (Chronic) ICD-10-CM: U82.6 ICD-9-CM: 272.4  1/14/2013 Yes Chronic Plan:  (Medical Decision Making) --continue BiPAP prn 
--wean Optiflow as tolerated 
--ID following, on Cefepime and Vanc 3/6- 
--will ultrasound left to determine if effusion. Would need warfarin bridged. -- 
More than 50% of the time documented was spent in face-to-face contact with the patient and in the care of the patient on the floor/unit where the patient is located.  
 
Vicky Alejandra MD

## 2020-03-10 NOTE — PROGRESS NOTES
Warfarin dosing per pharmacist 
 
Immanuel Gloria is a 69 y.o. male. Height: 5' 10\" (177.8 cm)   Weight 103.5 kg (228 lbs) Indication:  AVR and afib Goal INR:  2.5 - 3.5 Home dose:  2.5 mg daily Risk factors or significant drug interactions: HIT+(SHARON positive on 1/18/20), amiodarone (dc'd 2/5), Levofloxacin (2/13- 2/20 ), mirtazapine (2/13-2/24), escitalopram (2/19-2/20), trazodone (started 2/24) Other anticoagulants:  N/A Daily Monitoring Date  INR     Warfarin dose HGB              Notes 2/14  2.4  3 mg  8.7 2/15  2.2  4 mg  9.6 2/16  2.1  5 mg  9.8 2/17  2.1  5 mg  9.5 2/18  2.8  2 mg  9.3 2/19  2.5  2 mg  8.7 
2/20  1.9  3 mg   8.8 
2/21  1.9  4 mg  8.6 
2/22  2.1  3 mg  8.5 
2/23  1.9  4 mg  --- 
2/24  1.7  3 mg + 2 mg --- 
2/25  1.7  5 mg  --- 
2/26  1.6  6 mg  --- 
2/27  1.7  6 mg   --- 
2/28  1.7  7.5 mg  --- 
2/29  2.1 ` 6 mg  7.7 
3/1  2.0  7.5 mg  --- 
3/2  2.9  6 mg  --- 
3/3  3.4  5 mg  8.0 
3/4  2.7  7.5mg  8.6 
3/5  2.9  6 mg  8.0 
3/6  3.1  Held  8.4 
3/7  2.6  6 mg  8.0 
3/8  2.7  6mg  --- 
3/9  2.5  6 mg  --- 
3/10  2.7  6 mg  7.6 INR is therapeutic today. Patient continues to have labile INRs. Will continue 6 mg daily for now. Further dose adjustments based on INR trend. If pulmonary needs to do a thoracentesis, warfarin would have to be held and patient would need to be bridged with argatroban (HIT +). Pharmacy will continue to follow patient and monitor INR daily. Thank you, Mariam Lacy, PharmD, [facility] Clinical Pharmacist

## 2020-03-10 NOTE — PROGRESS NOTES
Patient awake resting in bed. Respirations present. On 6 L HF NC. No signs of distress. No needs expressed. Bed low and locked. Call light within reach. Will give report to oncoming RN.

## 2020-03-10 NOTE — PROGRESS NOTES
Infectious Disease Note Today's Date: 3/10/2020 Admit Date: 2020 Impression: · Acute respiratory failure with chronic L pleural effusion · Recent CABG/AVR with PPM (20) · DEJUAN on CKD, now on HD. 
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 Will treat for HCAP Anti-infectives: · Vanc/Cefepime 3/6- · Levaquin - Subjective: Afebrile, seen in HD. Respiratory status much improved today--able to complete sentences. Complains about hospital staff. Allergies Allergen Reactions  Heparin Other (comments) HIT antibody test strongly positive on 2020. SHARON drawn 2020 and resulted positive on 2020  Amoxicillin Rash  Keflex [Cephalexin] Rash  Pcn [Penicillins] Other (comments) \"felt closed in\". No rash Review of Systems:  A comprehensive review of systems was negative except for that written in the History of Present Illness. Objective:  
 
Visit Vitals /60 (BP 1 Location: Right arm, BP Patient Position: At rest) Pulse 72 Temp 98 °F (36.7 °C) Resp 20 Ht 5' 10\" (1.778 m) Wt 115.7 kg (255 lb) SpO2 98% BMI 36.59 kg/m² Temp (24hrs), Av.2 °F (36.8 °C), Min:97.9 °F (36.6 °C), Max:98.5 °F (36.9 °C) No changes from my previous exam 3/9 Lines:  Peripheral IV: L chest HD line Physical Exam:   
General:  Alert, cooperative,moderate distress--cannot complete sentences due to SOB, appears stated age Eyes:  Sclera anicteric. Pupils equally round and reactive to light. Mouth/Throat: Mucous membranes normal, oral pharynx clear Neck: Supple Lungs:   Very diminished anteriorly particularly LLL   
CV:  Regular rate and rhythm,no murmur, click, rub or gallop Abdomen:   Soft, non-tender. bowel sounds normal. non-distended Extremities: No cyanosis. +2 generalized Skin: Skin color, texture, turgor normal. no acute rash or lesions. Sternal and LLE donor sites benign, frankie necrotic toes Lymph nodes: Cervical and supraclavicular normal  
Musculoskeletal: No swelling or deformity Lines/Devices:  Intact, no erythema, drainage or tenderness Psych: Alert and oriented, normal mood affect given the setting Data Review: CBC: 
Recent Labs  
  03/10/20 
0730 03/08/20 
0321 WBC 7.2 6.9 HGB 7.6* 7.9*  
HCT 25.2* 26.3*  
 202 BMP: 
Recent Labs  
  03/10/20 
0730 03/08/20 
0321 CREA 1.79* 1.88* BUN 18 18  139  
K 4.3 4.5  
 103 CO2 31 33* AGAP 3* 3*  
* 109* LFTS: 
No results for input(s): TBILI, ALT, SGOT, AP, TP, ALB in the last 72 hours. Microbiology:  
 
All Micro Results Procedure Component Value Units Date/Time CULTURE, RESPIRATORY/SPUTUM/BRONCH Mushtaq Gallus [[id number]] Collected:  03/09/20 9369 Order Status:  Completed Specimen:  Sputum Updated:  03/10/20 0845 Special Requests: NO SPECIAL REQUESTS     
  GRAM STAIN PENDING Culture result: MODERATE YEAST SUBCULTURE IN PROGRESS  
     
 CULTURE, BLOOD [[id number]] Collected:  03/06/20 0459 Order Status:  Completed Specimen:  Blood Updated:  03/09/20 1200 Special Requests: --     
  LEFT Antecubital 
  
  Culture result: NO GROWTH 3 DAYS     
 CULTURE, BLOOD [[id number]] Collected:  03/06/20 0944 Order Status:  Completed Specimen:  Blood Updated:  03/09/20 1200 Special Requests: --     
  RIGHT Antecubital 
  
  Culture result: NO GROWTH 3 DAYS     
 CULTURE, RESPIRATORY/SPUTUM/BRONCH Minnette Hoe STAIN [[id number]] Collected:  03/06/20 1600 Order Status:  Canceled Specimen:  Sputum C. DIFFICILE AG & TOXIN A/B [[id number]] Order Status:  Canceled Specimen:  Stool CULTURE, RESPIRATORY/SPUTUM/BRONCH Mushtaq Gallus [[id number]] Collected:  02/17/20 1220 Order Status:  Completed Specimen:  Sputum from Bronchial Washing Updated:  02/24/20 1244 Special Requests: NO SPECIAL REQUESTS GRAM STAIN 20 TO 35 WBCS SEEN PER OIF  
   1 TO 2 EPITHELIAL CELLS SEEN PER OIF  
      
  MODERATE GRAM POSITIVE COCCI  
     
   FEW GRAM POSITIVE RODS Culture result:    
  LIGHT NORMAL RESPIRATORY BRENNAN Imaging:  
See hPI/EPIC Signed By: Edwige Lopez NP March 10, 2020

## 2020-03-10 NOTE — DIALYSIS
TRANSFER OUT- DIALYSIS Hemodialysis treatment completed without complications. Patient alert and VS stable  /46  P 76   
 
 2.5 Kgs removed. Flushed both ports with 10 mL of NS.  CVC dressing clean, dry, and intact, tego caps intact, bilateral lumens wrapped with 4x4 gauze. Patient to room 23-14-20-09 after dialysis.

## 2020-03-10 NOTE — DIALYSIS
TRANSFER IN - DIALYSIS Received patient in dialysis unit  from room 824 (unit) for ordered procedure. Consent verified for renal replacement therapy. Patient alert and vital signs stable. /50 P 88 Hemodialysis initiated using left perm cath. Aspirated and flushed both ports without difficulty. Dressing clean, dry and intact. Machine settings per MD order. Heparin 0 unit bolus and 0 units/hr. Will monitor during treatment.

## 2020-03-10 NOTE — PROGRESS NOTES
Problem: Self Care Deficits Care Plan (Adult) Goal: *Acute Goals and Plan of Care (Insert Text) Description 1. Patient will complete lower body bathing and dressing with Mod I and adaptive equipment as needed. 2. Patient will complete toileting with Mod I and adaptive equipment as needed. 3. Patient will tolerate 23 minutes of OT treatment with 2 rest breaks to increase activity tolerance for ADLs. 4. Patient will complete functional transfers with Mod I and adaptive equipment as needed. 5. Patient will complete functional transfer to sink to perform grooming with Mod I and adaptive equipment as needed. 6. Patient will perform pursed-lip breathing with one verbal and one visual cue to increase activity tolerance for performance of ADLs. Goals per re-evaluation on 3/10/2020: 
1. Patient will complete lower body bathing and dressing with Min A and adaptive equipment as needed. 2. Patient will complete toileting with Min A and adaptive equipment as needed. 3. Patient will complete bed mobility from supine to sit with CGA and one verbal cue for placement of UE to assist. 
4. Patient will complete functional transfer from sit to stand with SBA and use of adaptive equipment as needed. 5. Patient will complete grooming standing at sink level with SBA and use of adaptive equipment as needed. 6. Patient will perform pursed-lip breathing with one verbal and one visual to increase activity tolerance for performance of seated and standing ADL tasks. Timeframe: 7 visits Outcome: Progressing Towards Goal 
 
OCCUPATIONAL THERAPY: Daily Note, Re-evaluation and PM  
 3/10/2020 INPATIENT: OT Visit Days: 1 Payor: Jonathan Morning / Plan: [facility] / Product Type: amiando Care Medicare /  
  
NAME/AGE/GENDER: Michael Horta is a 69 y.o. male PRIMARY DIAGNOSIS:  Acute respiratory failure ([facility] [address] [J96.00] Fluid overload [E87.70] Acute hypoxemic respiratory failure (HCC) Acute hypoxemic respiratory failure ([facility] [address] Procedure(s) (LRB): ULTRASOUND (Bilateral) Day of Surgery ICD-10: Treatment Diagnosis:  
 · Generalized Muscle Weakness (M62.81) · Difficulty in walking, Not elsewhere classified (R26.2) · History of falling (Z91.81) Precautions/Allergies: 
   Heparin; Amoxicillin; Keflex [cephalexin]; and Pcn [penicillins] ASSESSMENT:  
 
Mr. Winford Phoenix  is a 69 y.o. male who was d/c following CABG and Aortic Valve Placement on 2/12 to [facility] for STR then readmitted on 2/13 for Acute Respiratory Failure and SOB. Medical chart review reveals that pt has also had suicidal ideation, but when confronted, pt denied this. Pt was transferred to ICU on 3/6 due to increased SOB and placed on BIPAP. Pt transferred from ICU to 8th floor on 3/9 and seen for Re-Evaluation today, 3/10. At baseline, patient lives with wife in one story home with 2 ZACHARIAH. Wife home and available 24/7 if needed. Pt is typically Mod I to independent for performance of ADLs and IADLs. Pt typically uses no DME for in home or community mobility, but does have [facility] available if needed. Reports that he is able to walk approximately 200 feet without any DME before having to stop to take a rest break due to becoming SOB. Pt reports that he has had multiple falls during acute care stay and vividly describes a fall he had off the bedside commode with RN in room. 3/10/2020 Pt presents supine in bed breathing 4L High flow nasal cannula with son seated adjacent. Pt alert and oriented x 4. Reports 4/10 pain in R hand where per pt report, RN had difficulty placing IV. Per medical chart review, patient with orthostatic hypotension, thus BP assessed throughout session and noted below. Pt requires Mod A for functional transfer from supine to seated edge of bed with increased assist needed for upper body mobility.  Seated edge of bed, pt presents with fair static and dynamic seated balance with slumped posture and lean towards left side. Pt provided with moderate verbal, visual, and tactile cueing to roll shoulders back, hold head upright, and orient to midline. Pt has difficulty performing this and requires increased support from bed rail to maintain upright position. BUE generally decreased, but WFL with increased pain in R hand from prior IV placement. Pt attempts to don/doff socks, but presents with drastic decrease in dynamic seated balance resulting in CGA from therapy and Total A to don/doff socks. Pt completes initial transfer from sit to stand with Min A x 2 due to pt hesitancy to come to full stand. Upon standing, static balance fair with slumped posture and anterior lean. Pt provided with moderate verbal, visual, and tactile cueing to roll shoulders back and bring pelvis forward to improve posture and balance for standing ADLs. Pt becomes fearful of falling and reports increased dizziness requesting return to seated edge of bed. Following rest break, sit to stand re-attempted and pt CGA x 2 (for increased safety) with use of RW. Pt becomes extremely anxious upon standing with increased fear of falling. With therapist encouragement, pt stays in standing position for approximately 1 minute and Bp increases drastically, most likely from pt increased anxiety regarding falling. Pt requests return to seated position stating that he can't do any more therapy today. Pt CGA to return from sit to supine due to impulsive body movements and bed placed in Trendelenburg position to assist with decreasing pt Bp. Pt becomes anxious following change in bed position and reports that he cannot breath. O2 levels assessed and pt at 86%. Pt encouraged to perform pursed-lip breathing and unable to increase O2 above 88%. RN in room and increases O2 to 5L O2. Pt still unable to achieve O2 above 89%. O2 increased to 6.5L O2 in order for pt to achieve 92%. Bp assessed and within normal range.  Pt reassured that he did a good job with out of bed mobility and left supine in bed with head of bed elevated to promote increased air flow. Bp Supine Prior to Functional Mobility: 123/56 Bp Seated Edge of Bed: 107/46 Bp in Standing 2nd Attempt: 141/76 Bp in Supine Following Functional Mobility: 127/60 Pt continues to function below baseline for performance of ADLs and functional mobility and would benefit from skilled OT to increase independence and safety for performance of ADLs and functional mobility. Will continue POC. Pt and pt son were educated on role of occupational therapy. At this time, patient is appropriate for Co-treatment with physical therapy due to patient's decreased overall endurance/tolerance levels, as well as need for high level skilled cueing to complete functional transfers/mobility and functional tasks. In addition, pt presents with hypervigilance in regards to falling and increased dizziness with out of bed mobility, thus co-treatment may assist with reducing pt anxiety and increasing out of bed mobility. Pt is appropriate for a multidisciplinary co-treatment of PT and OT to address goals of both disciplines. This section established at most recent assessment PROBLEM LIST (Impairments causing functional limitations): 1. Decreased Strength 2. Decreased ADL/Functional Activities 3. Decreased Transfer Abilities 4. Decreased Ambulation Ability/Technique 5. Decreased Balance 6. Increased Pain 7. Decreased Activity Tolerance 8. Decreased Pacing Skills 9. Increased Fatigue 10. Increased Shortness of Breath 11. Decreased Skin Integrity/Hygeine INTERVENTIONS PLANNED: (Benefits and precautions of occupational therapy have been discussed with the patient.) 1. Activities of daily living training 2. Adaptive equipment training 3. Balance training 4. Clothing management 5. Hygiene training 6. Neuromuscular re-eduation 7. Re-evaluation 8. Therapeutic activity 9. Therapeutic exercise TREATMENT PLAN: Frequency/Duration: Follow patient 3x/week to address above goals. Rehabilitation Potential For Stated Goals: Fair REHAB RECOMMENDATIONS (at time of discharge pending progress):   
Placement: It is my opinion, based on this patient's performance to date, that Mr. Sofia Tenorio may benefit from intensive therapy at a [address] [place] [address] after discharge due to the functional deficits listed above that are likely to improve with skilled rehabilitation and concerns that he/she may be unsafe to be unsupervised at home due to decreased independence, safety, balance,and activity tolerance. .  
Equipment: ? TBD   
    
 
 
 
OCCUPATIONAL PROFILE AND HISTORY:  
History of Present Injury/Illness (Reason for Referral): 
See H & P Past Medical History/Comorbidities:  
Mr. Sofia Tenorio  has a past medical history of Arthritis, BPH (benign prostatic hyperplasia) (1/14/2013), CAD (coronary artery disease), DM type 2 (diabetes mellitus, type 2) ([facility] [address] (dx 2004), Dyspnea, Gout (1/14/2013), HLD (hyperlipidemia) (1/14/2013), HTN (hypertension), Morbid obesity ([facility] [address] (9/3/14), Psychiatric disorder, Rheumatic fever, Seasonal allergic rhinitis, Severe aortic valve stenosis, Thyroid disease, and Unspecified sleep apnea (2016). Mr. Sofia Tenorio  has a past surgical history that includes hx tonsil and adenoidectomy; hx heart catheterization (12/23/2019); hx orthopaedic (Left); hx cataract removal (Bilateral, 2012); and ir insert tunl cvc w port over 5 years (2/4/2020). Social History/Living Environment:  
Home Environment: Private residence # Steps to Enter: 2 Rails to Enter: No 
One/Two Story Residence: One story Living Alone: No 
Support Systems: Spouse/Significant Other/Partner Patient Expects to be Discharged to[de-identified] Rehabilitation facility Current DME Used/Available at Home: Cane, straight, Walker, rolling Tub or Shower Type: Shower Prior Level of Function/Work/Activity: At baseline, patient lives with wife in one story home with 2 ZACHARIAH. Wife home and available 24/7 if needed. Pt is typically Mod I to independent for performance of ADLs and IADLs. Pt typically uses no DME for in home or community mobility, but does have [facility] available if needed. Reports that he is able to walk approximately 200 feet without any DME before having to stop to take a rest break due to becoming SOB. Pt reports that he has multiple falls during acute care stay and vividly describes a fall he had off the bedside commode. Personal Factors:   
      Sex:  male Age:  69 y.o. Number of Personal Factors/Comorbidities that affect the Plan of Care: Extensive review of physical, cognitive, and psychosocial performance (3+):  HIGH COMPLEXITY ASSESSMENT OF OCCUPATIONAL PERFORMANCE[de-identified]  
Activities of Daily Living:  
Basic ADLs (From Assessment) Complex ADLs (From Assessment) Feeding: Minimum assistance Oral Facial Hygiene/Grooming: Minimum assistance Bathing: Moderate assistance Upper Body Dressing: Minimum assistance Lower Body Dressing: Moderate assistance Toileting: Moderate assistance Instrumental ADL Meal Preparation: Total assistance Homemaking: Total assistance Grooming/Bathing/Dressing Activities of Daily Living Lower Body Dressing Assistance Socks: Total assistance (dependent) Bed/Mat Mobility Supine to Sit: Moderate assistance Sit to Supine: Contact guard assistance Sit to Stand: Contact guard assistance Stand to Sit: Contact guard assistance Scooting: Stand-by assistance Most Recent Physical Functioning:  
Gross Assessment: 
AROM: Within functional limits Strength: Generally decreased, functional 
Coordination: Grossly decreased, non-functional 
         
  
Posture: 
Posture (WDL): Exceptions to [facility] Posture Assessment: Forward head, Rounded shoulders Balance: 
Sitting: Impaired Sitting - Static: Fair (occasional) Sitting - Dynamic: Fair (occasional) Standing: Impaired Standing - Static: Fair Bed Mobility: 
Supine to Sit: Moderate assistance Sit to Supine: Contact guard assistance Scooting: Stand-by assistance Wheelchair Mobility: 
  
Transfers: 
Sit to Stand: Contact guard assistance Stand to Sit: Contact guard assistance Patient Vitals for the past 6 hrs: 
 BP BP Patient Position SpO2 O2 Flow Rate (L/min) Pulse 03/10/20 1230 119/59    76  
03/10/20 1304 106/51    78  
03/10/20 1328 108/46    76  
03/10/20 1349 122/70 At rest 94 %  80  
03/10/20 1451   95 % 4 l/min   
03/10/20 1535 127/60  92 %    
03/10/20 1543 128/57  92 %  78  
03/10/20 1600     80 Mental Status Neurologic State: Alert Orientation Level: Oriented X4 Cognition: Follows commands Perception: Appears intact Perseveration: No perseveration noted Safety/Judgement: Fall prevention Physical Skills Involved: 
1. Balance 2. Strength 3. Activity Tolerance 4. Fine Motor Control 5. Vision 6. Pain (acute) 7. Skin Integrity Cognitive Skills Affected (resulting in the inability to perform in a timely and safe manner): 1. Perception 2. Sustained Attention 3. Divided Attention Psychosocial Skills Affected: 1. Habits/Routines 2. Environmental Adaptation 3. Social Interaction Number of elements that affect the Plan of Care: 5+:  HIGH COMPLEXITY CLINICAL DECISION MAKING:  
[employer] [employer] [facility] 6 Clicks Daily Activity Inpatient Short Form How much help from another person does the patient currently need. .. Total A Lot A Little None 1. Putting on and taking off regular lower body clothing? [] 1   [x] 2   [] 3   [] 4  
2. Bathing (including washing, rinsing, drying)? [] 1   [x] 2   [] 3   [] 4  
3. Toileting, which includes using toilet, bedpan or urinal?   [] 1   [x] 2   [] 3   [] 4  
4. Putting on and taking off regular upper body clothing?    [] 1   [] 2 [x] 3   [] 4  
5. Taking care of personal grooming such as brushing teeth? [] 1   [] 2   [x] 3   [] 4  
6. Eating meals? [] 1   [] 2   [x] 3   [] 4  
© 2007, Trustees of [address] [place] [address] [address] [id number], under license to [employer]. All rights reserved Score:  Initial: 15, completed, 2/15/2020 Most Recent: 15 completed, (Date: 3/10/2020) Interpretation of Tool:  Represents activities that are increasingly more difficult (i.e. Bed mobility, Transfers, Gait). Medical Necessity:    
· Skilled intervention continues to be required due to decreased independence, safety, balance, and activity tolerance. In addition, pt having increased dizziness that is limiting pt ability to perform ADLs and functional mobility. Maria Del Rosario Gottlieb Reason for Services/Other Comments: 
· Patient continues to require skilled intervention due to medical complications and patient unable to attend/participate in therapy as expected. Use of outcome tool(s) and clinical judgement create a POC that gives a: MODERATE COMPLEXITY  
 
 
 
TREATMENT:  
(In addition to Assessment/Re-Assessment sessions the following treatments were rendered) Pre-treatment Symptoms/Complaints:  \"I've got to sit down. I just can't stand up anymore. I'm going to fall. Pain: Initial:  
Pain Intensity 1:4 Pain Location: Right hand Post Session:  Unchanged Neuromuscular Re-education: (30 minutes):  Exercise/activities below to improve balance, coordination and posture. Required moderate visual, verbal, manual and tactile cues to promote static and dynamic balance in sitting and standing. Seated edge of bed, pt presents with fair static and dynamic seated balance with slumped posture and lean towards left side. Pt provided with moderate verbal, visual, and tactile cueing to roll shoulders back, hold head upright, and orient to midline.  Pt has difficulty performing this and requires increased support from bed rail to maintain upright position. Pt presents with decreased dynamic seated balance when attempting to don/doff socks, resulting in therapist having to provide CGA to assist pt with maintaining upright position. Upon standing, pt presents with fair static balance with slumped posture and anterior lean. Pt provided with moderate verbal, visual, and tactile cueing to roll shoulders back and bring pelvis forward to improve posture and balance for standing ADLs. Braces/Orthotics/Lines/Etc:  
· Telemetry Monitor · O2 Device: Nasal cannula( High Flow) · IV · Turner Catheter Treatment/Session Assessment:   
· Response to Treatment:  Pt presents with self-limiting behavior due to hypervigilance and increased fear of falling. · Interdisciplinary Collaboration:  
o Physical Therapist 
o Occupational Therapist 
o Registered Nurse · After treatment position/precautions:  
o Supine in bed 
o Bed/Chair-wheels locked 
o Bed in low position 
o Call light within reach 
o RN notified 
o Side rails x 3  
· Compliance with Program/Exercises: Compliant most of the time, Will assess as treatment progresses. · Recommendations/Intent for next treatment session: \"Next visit will focus on advancements to more challenging activities and reduction in assistance provided\". Total Treatment Duration: OT Patient Time In/Time Out Time In: 1068 Time Out: 4497 Yoselin Chaudhary

## 2020-03-10 NOTE — PROGRESS NOTES
Nutrition follow-up Initial assessment for:  
Best Practice Alert for Pressure Injury stage 2 or greater 
  
Assessment: Admitted with A/chronic hypoxic respiratory failure/pna, DVT, CAD s/p CABG s/p AVR, HTN, DM, ESRD on HD.  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 He continues on HD. Since last assessment, patient has had increased shortness of breath. He was transferred to ICU for BiPAP and transferred back to floor yesterday. He has been on BiPAP, Optiflow, nasal canula. Patient was seen prior to transfer to HD. He states that his appetite has been okay. Reports some decreased intake secondary to breathing status. States he ate all am meal except grits because he only like Splenda in grits and was not available this am. He continues to drink Nepro daily. Per RN, patient is able to tolerate Optiflow/nasal canula for meals and continues to eat well. He is pending [facility] approval. 
Skin Status per Wound Care:  
coccyx gluteal cleft wound - improving per wound care, full thickness slough and mild erythema surrounding, demarcating, moderate amount of serosanguinous drainage Left thigh upper wound is one of the vein harvest sites, from CABG - also improving per wound care DIET DIABETIC CONSISTENT CARB Regular DIET Nepro with breakfast   
Per charting intake of meals % of meals. Anthropometrics: 
Height: 5' 10\" (177.8 cm), Weight: 99.8 kg (220 lb 0.3 oz), Weight Source: Bed, Body mass index is 31.57 kg/m². BMI class of obese. His weight has fluctuated per EMR review but is likely associated with start of HD, fluid status an/or poor po intake.  
Macronutrient needs: (using IBW (Ideal body weight) 75.5 kg) LYW: 8606-7355 BEYE/GIQ (25-30 kcal/kg) EPR:  g/day (1.2-1.4 g/kg) 
     
Nutrition Intervention: Meals and snacks: Continue current diet, with chopped meats. Medical food supplement therapy: Continue Nepro 1 x per day for additional protein  - patient prefers berry. Additional Nepro available on floor - discussed with patient and RN, if patient unable to consume meal due to breathing status, okay to use floor stock. Coordination of Care: [place] [place] RN Discharge Nutrition Plan: Too soon to determine. 
  
[address] [facility], [address], [address] [facility]

## 2020-03-10 NOTE — PROGRESS NOTES
[facility] awake resting in bed. Respirations present. On bi-pap. No signs of distress. No needs expressed. Bed low and locked. Call light within reach. Will continue to monitor.

## 2020-03-10 NOTE — PROGRESS NOTES
CM spoke with the patient's son. He's agreeable to patient discharging to LTAC if insurance approves.

## 2020-03-10 NOTE — PROGRESS NOTES
Bilateral Ultra Sound done of the chest, pictures taken and placed on chart, not enough fluid to perform Thoracentesis per Dr Lindsay Jaquez.

## 2020-03-11 NOTE — PROGRESS NOTES
[facility] OUTREACH NURSE PROGRESS REPORT SUBJECTIVE: Called to assess patient secondary to outreach protocol. MEWS Score: 1 (03/10/20 1942) Vitals:  
 03/10/20 1942 03/10/20 2011 03/10/20 2305 03/10/20 2356 BP: 135/65  114/55 Pulse: 77  (!) 59 Resp: 19  20 Temp: 97.7 °F (36.5 °C)  97.8 °F (36.6 °C) SpO2: 98% 97% 98% 99% Weight:      
Height:      
  
 
LAB DATA: 
 
Recent Labs  
  03/10/20 
0730 03/08/20 
0321  139  
K 4.3 4.5  
 103 CO2 31 33* AGAP 3* 3*  
* 109* BUN 18 18 CREA 1.79* 1.88* GFRAA 48* 45* GFRNA 40* 38* CA 7.8* 8.0* Recent Labs  
  03/10/20 
0730 03/08/20 
0321 WBC 7.2 6.9 HGB 7.6* 7.9*  
HCT 25.2* 26.3*  
 202 OBJECTIVE: On arrival to room, I found patient to be sleeping in bed on CPAP. Pain Assessment Pain Intensity 1: 0 (03/10/20 1942) Pain Location 1: Hand, Wrist, Other (comment)(Left) Patient Stated Pain Goal: 0 
 
  
  
  
  
 
  
  
  
   
 
ASSESSMENT:  Pt is sleeping and awakes to voice. Pt on CPAP. O2 sat= 100%. VSS. PLAN:  Will continue to monitor per protocol.

## 2020-03-11 NOTE — PROGRESS NOTES
Warfarin dosing per pharmacist 
 
Josh Sanchez is a 69 y.o. male. Height: 5' 10\" (177.8 cm)   Weight 103.5 kg (228 lbs) Indication:  AVR and afib Goal INR:  2.5 - 3.5 Home dose:  2.5 mg daily Risk factors or significant drug interactions: HIT+(SHARON positive on 1/18/20), amiodarone (dc'd 2/5), Levofloxacin (2/13- 2/20 ), mirtazapine (2/13-2/24), escitalopram (2/19-2/20), trazodone (started 2/24) Other anticoagulants:  N/A Daily Monitoring Date  INR     Warfarin dose HGB              Notes 2/14  2.4  3 mg  8.7 2/15  2.2  4 mg  9.6 2/16  2.1  5 mg  9.8 2/17  2.1  5 mg  9.5 2/18  2.8  2 mg  9.3 2/19  2.5  2 mg  8.7 
2/20  1.9  3 mg   8.8 
2/21  1.9  4 mg  8.6 
2/22  2.1  3 mg  8.5 
2/23  1.9  4 mg  --- 
2/24  1.7  3 mg + 2 mg --- 
2/25  1.7  5 mg  --- 
2/26  1.6  6 mg  --- 
2/27  1.7  6 mg   --- 
2/28  1.7  7.5 mg  --- 
2/29  2.1 ` 6 mg  7.7 
3/1  2.0  7.5 mg  --- 
3/2  2.9  6 mg  --- 
3/3  3.4  5 mg  8.0 
3/4  2.7  7.5mg  8.6 
3/5  2.9  6 mg  8.0 
3/6  3.1  Held  8.4 
3/7  2.6  6 mg  8.0 
3/8  2.7  6mg  --- 
3/9  2.5  6 mg  --- 
3/10  2.7  6 mg  7.6 
3/11  3.3  5 mg  8.3 INR is therapeutic today. Patient continues to have labile INRs. Trended up to 3.3 today from 2.7. Will give 5 mg today for now. Further dose adjustments based on INR trend. If pulmonary needs to do a thoracentesis, warfarin would have to be held and patient would need to be bridged with argatroban (HIT +). Pharmacy will continue to follow patient and monitor INR daily. Thank you, Nicole Hernandez, PharmD, [facility] Clinical Pharmacy Specialist 
[phone]

## 2020-03-11 NOTE — PROGRESS NOTES
Infectious Disease Note Today's Date: 3/11/2020 Admit Date: 2020 Impression: · Acute respiratory failure with chronic L pleural effusion · Recent CABG/AVR with PPM (20) · DEJUAN on CKD, now on HD. 
· HIT+ with frankie LE/RUE DVTs, on coumadin Plan:  
 
· Continue Vancomycin and Cefepime x 7days total, EOT 3/13. · **Will sign off at this time. Please call with questions or concerns. Anti-infectives: · Vanc/Cefepime 3/6- · Levaquin - Subjective:  
De-escalated to 6L O2 NC, breathing comfortably. Allergies Allergen Reactions  Heparin Other (comments) HIT antibody test strongly positive on 2020. SHARON drawn 2020 and resulted positive on 2020  Amoxicillin Rash  Keflex [Cephalexin] Rash  Pcn [Penicillins] Other (comments) \"felt closed in\". No rash Review of Systems:  A comprehensive review of systems was negative except for that written in the History of Present Illness. Objective:  
 
Visit Vitals /55 Pulse 71 Temp 97.5 °F (36.4 °C) Resp 20 Ht 5' 10\" (1.778 m) Wt 115.7 kg (255 lb) SpO2 95% BMI 36.59 kg/m² Temp (24hrs), Av.7 °F (36.5 °C), Min:97.5 °F (36.4 °C), Max:98.1 °F (36.7 °C) No changes from my previous exam 3/10 Lines:  Peripheral IV: L chest HD line Physical Exam:   
General:  Alert, cooperative,NAD, appears stated age Eyes:  Sclera anicteric. Pupils equally round and reactive to light. Mouth/Throat: Mucous membranes normal, oral pharynx clear Neck: Supple Lungs:   Very diminished anteriorly particularly LLL   
CV:  Regular rate and rhythm,no murmur, click, rub or gallop Abdomen:   Soft, non-tender. bowel sounds normal. non-distended Extremities: No cyanosis. +2 generalized Skin: Skin color, texture, turgor normal. no acute rash or lesions. Sternal and LLE donor sites benign, frankie necrotic toes Lymph nodes: Cervical and supraclavicular normal  
 Musculoskeletal: No swelling or deformity Lines/Devices:  Intact, no erythema, drainage or tenderness Psych: Alert and oriented, normal mood affect given the setting Data Review: CBC: 
Recent Labs  
  03/11/20 
0702 03/10/20 
0730 WBC 7.7 7.2 HGB 8.3* 7.6* HCT 27.2* 25.2*  
 162 BMP: 
Recent Labs  
  03/11/20 
0702 03/10/20 
0730 CREA 1.72* 1.79* BUN 19 18  138  
K 4.3 4.3  104 CO2 26 31 AGAP 7 3* * 140* LFTS: 
No results for input(s): TBILI, ALT, SGOT, AP, TP, ALB in the last 72 hours. Microbiology:  
 
All Micro Results Procedure Component Value Units Date/Time CULTURE, BLOOD [[id number]] Collected:  03/06/20 0209 Order Status:  Completed Specimen:  Blood Updated:  03/10/20 1203 Special Requests: --     
  LEFT Antecubital 
  
  Culture result: NO GROWTH 4 DAYS     
 CULTURE, BLOOD [[id number]] Collected:  03/06/20 0944 Order Status:  Completed Specimen:  Blood Updated:  03/10/20 1203 Special Requests: --     
  RIGHT Antecubital 
  
  Culture result: NO GROWTH 4 DAYS     
 CULTURE, RESPIRATORY/SPUTUM/BRONCH Benji Turner STAIN [[id number]] Collected:  03/09/20 8749 Order Status:  Completed Specimen:  Sputum Updated:  03/10/20 1027 Special Requests: NO SPECIAL REQUESTS     
  GRAM STAIN 10 TO 50 WBC'S/OIF  
   0 TO 2 EPITHELIAL CELLS/OIF  
   FEW GRAM POSITIVE RODS MODERATE YEAST 4+ MUCUS PRESENT Culture result: MODERATE YEAST SUBCULTURE IN PROGRESS  
     
 CULTURE, RESPIRATORY/SPUTUM/BRONCH Clemon Muff [[id number]] Collected:  03/06/20 1600 Order Status:  Canceled Specimen:  Sputum C. DIFFICILE AG & TOXIN A/B [[id number]] Order Status:  Canceled Specimen:  Stool CULTURE, RESPIRATORY/SPUTUM/BRONCH Clemon Muff [[id number]] Collected:  02/17/20 1220 Order Status:  Completed Specimen:  Sputum from Bronchial Washing Updated:  02/24/20 1244 Special Requests: NO SPECIAL REQUESTS     
  GRAM STAIN 20 TO 35 WBCS SEEN PER OIF  
   1 TO 2 EPITHELIAL CELLS SEEN PER OIF  
      
  MODERATE GRAM POSITIVE COCCI  
     
   FEW GRAM POSITIVE RODS Culture result:    
  LIGHT NORMAL RESPIRATORY BRENNAN Imaging:  
See [facility]/EPIC Signed By: Anish Huynh NP March 11, 2020

## 2020-03-11 NOTE — PROGRESS NOTES
Received bedside shift report from Ayana Sow RN. Pt lying in bed. No apparent distress. Respirations even and unlabored. Instructed to call for assistance with needs, as they arise. Pt voiced understanding.

## 2020-03-11 NOTE — PROGRESS NOTES
Date of Outreach Update: 
Juani Macario was seen and assessed. Pt on CPAP. O2= 99%. Mild HTN. HR= 70. Previous Outreach assessment has been reviewed. There have been no significant clinical changes since the completion of the last dated Outreach assessment. Will continue to follow up per outreach protocol. Signed By:   Mariana Minus   March 11, 2020 5:22 AM

## 2020-03-11 NOTE — PROGRESS NOTES
Critical Care Daily Progress Note: 3/11/2020 Jovani Hess Admission Date: 2/13/2020 The patient's chart is reviewed and the patient is discussed with the staff. 70 yo Melanie Habermann a history of CAD s/p CABG with AVR 1/10/20 by Dr. Thais Sanders complicated by CKD requiring HD, HIT +, DVTs, wound left thigh and pneumonia.  Was discharged on Levaquin Q 48 hours last dose 2/11/20. Lakeshia Butler was discharged on NC 2 L but has been increased to NC 4L O2 at [facility]. He presented to the ER on 2/15 with increased SOB. CTA chest was negative for PE but persistent LLL opacity. He had a bronch 2/17 with negative cultures and was treated for PNA using levaquin. He is now ESRD and being followed by nephrology. We were reconsulted on 3/6 for acute respiratory failure requiring bipap. CXR shows increased consolidation in the left base. He is febrile with a temp of 100.5. He has refused a swallow evaluation. ID is following and he was started on Vanc and Cefepime on 3/6. Subjective: CPAP worn last night and now on 6lpm with O2 sat of 95% Fluid balance 3200ml neg yesterday with 3.5kg removed with dialysis. Still reports dyspnea is severe and speaks in gasping breaths Bedside ultrasound performed yesterday and effusion too small for thoracentesis, which would require argatroban bridge. Current Facility-Administered Medications Medication Dose Route Frequency  Vancomycin Intermittent Dosing   Other PRN  
 HYDROcodone-homatropine (HYCODAN) 5-1.5 mg/5 mL (5 mL) syrup 5 mL  5 mL Oral Q4H PRN  
 benzonatate (TESSALON) capsule 100 mg  100 mg Oral TID PRN  
 guaiFENesin ER (MUCINEX) tablet 1,200 mg  1,200 mg Oral Q12H  
 albuterol (PROVENTIL VENTOLIN) nebulizer solution 2.5 mg  2.5 mg Nebulization Q6H RT  
 insulin glargine (LANTUS) injection 5 Units  5 Units SubCUTAneous QHS  mirtazapine (REMERON) tablet 7.5 mg  7.5 mg Oral QHS  sertraline (ZOLOFT) tablet 25 mg  25 mg Oral DAILY  ALPRAZolam (XANAX) tablet 0.25 mg  0.25 mg Oral Q6H PRN  
 busPIRone (BUSPAR) tablet 5 mg  5 mg Oral TID  sodium chloride (OCEAN) 0.65 % nasal squeeze bottle 2 Spray  2 Spray Both Nostrils Q2H PRN  
 morphine injection 2 mg  2 mg IntraVENous Q6H PRN  
 cefepime (MAXIPIME) 2 g in 0.9% sodium chloride (MBP/ADV) 100 mL  2 g IntraVENous Q12H  
 sodium chloride 3% hypertonic nebulizer soln  4 mL Nebulization BID RT  
 warfarin (COUMADIN) tablet 6 mg  6 mg Oral QPM  
 sodium chloride (OCEAN) 0.65 % nasal squeeze bottle 2 Spray  2 Spray Both Nostrils BID  epoetin maritza-epbx (RETACRIT) 14,000 Units combo injection  14,000 Units SubCUTAneous Q7D  
 insulin lispro (HUMALOG) injection   SubCUTAneous AC&HS  polyethylene glycol (MIRALAX) packet 17 g  17 g Oral DAILY  midodrine (PROAMITINE) tablet 10 mg  10 mg Oral TID WITH MEALS  tamsulosin (FLOMAX) capsule 0.4 mg  0.4 mg Oral QHS  loperamide (IMODIUM) capsule 4 mg  4 mg Oral QID PRN  pantothenic ac-min oil-pet,hyd (AQUAPHOR) 41 % ointment   Topical DAILY  traMADol (ULTRAM) tablet 50 mg  50 mg Oral Q6H PRN  
 sodium chloride (NS) flush 5-40 mL  5-40 mL IntraVENous PRN  
 acetaminophen (TYLENOL) tablet 650 mg  650 mg Oral Q4H PRN  
 naloxone (NARCAN) injection 0.4 mg  0.4 mg IntraVENous PRN  
 ondansetron (ZOFRAN) injection 4 mg  4 mg IntraVENous Q4H PRN  
 bisacodyL (DULCOLAX) tablet 5 mg  5 mg Oral DAILY PRN Review of Systems 
+cough-yellow/gray sputum +LUE pain/edema 
+dyspnea Constitutional:  negative for fever, chills, sweats Cardiovascular:  negative for chest pain, palpitations, syncope, edema Gastrointestinal:  negative for dysphagia, reflux, vomiting, diarrhea, abdominal pain, or melena Neurologic:  negative for focal weakness, numbness, headache Objective:  
 
Vitals:  
 03/11/20 3291 03/11/20 2006 03/11/20 6084 03/11/20 3286 BP: 151/85  145/55 Pulse: (!) 106  71 Resp: 19  20   
 Temp: 98.1 °F (36.7 °C)  97.5 °F (36.4 °C) SpO2: 98%  97% 95% Weight:  255 lb (115.7 kg) Height:      
 
 
 
Intake/Output Summary (Last 24 hours) at 3/11/2020 0941 Last data filed at 3/11/2020 1401 Gross per 24 hour Intake 600 ml Output 4000 ml Net -3400 ml Physical Exam:         
Constitutional:  the patient is well developed and in no acute distress, on optiflow 30L at 72% EENMT:  Sclera clear, pupils equal, oral mucosa moist 
Respiratory: insp wheezing anteriorly Cardiovascular:  RRR without M,G,R 
Gastrointestinal: soft and non-tender; with positive bowel sounds. Musculoskeletal: warm without cyanosis. 2+ LUE edema, 1+ BLE edema Skin:  no jaundice or rashes Neurologic: no gross neuro deficits Psychiatric:  alert and oriented x 3 LINES: 
Peripheral IV L arm, HD access DRIPS: 
none CXR:  
 
 
LAB Recent Labs  
  03/11/20 
0603 03/10/20 
2022 03/10/20 
1639 03/10/20 
1038 03/10/20 
0524 GLUCPOC 118* 305* 214* 190* 166* Recent Labs  
  03/11/20 
0702 03/10/20 
0730 03/09/20 
0321 WBC 7.7 7.2  --   
HGB 8.3* 7.6*  --   
HCT 27.2* 25.2*  --   
 162  --   
INR 3.3 2.7 2.5 Recent Labs  
  03/11/20 
0702 03/10/20 
0730  138  
K 4.3 4.3  104 CO2 26 31 * 140* BUN 19 18 CREA 1.72* 1.79* CA 7.9* 7.8* No results for input(s): PH, PCO2, PO2, HCO3, PHI, PCO2I, PO2I, HCO3I in the last 72 hours. No results for input(s): LCAD, LAC in the last 72 hours. No results for input(s): PH, PCO2, PO2, HCO3, PHI, PCO2I, PO2I, HCO3I in the last 72 hours. Patient Active Problem List  
Diagnosis Code  DM type 2 (diabetes mellitus, type 2) ([facility] E11.9  
 Gout M10.9  BPH (benign prostatic hyperplasia) N40.0  Seasonal allergic rhinitis J30.2  
 HLD (hyperlipidemia) E78.5  Nonrheumatic aortic valve stenosis I35.0  Obesity, morbid ([address] E66.01  
 Type 2 diabetes with nephropathy ([address] E11.21  
  Bilateral carotid artery stenosis I65.23  
 Aortic valve stenosis I35.0  
 CAD (coronary artery disease) I25.10  
 S/P CABG x 3 Z95.1  
 S/P AVR Z95.2  Atrial fibrillation (HCC) I48.91  
 HIT (heparin-induced thrombocytopenia) ([facility]) D75.82  
 Pneumonia due to infectious organism J18.9  Hypotension I95.9  Acute renal failure on dialysis (HCC) N17.9, Z99.2  DNR (do not resuscitate) [phone]  Acute on chronic respiratory failure with hypoxia ([facility]) J96.21 Assessment:  (Medical Decision Making) Hospital Problems  Date Reviewed: 3/9/2020 Codes Class Noted POA Acute on chronic respiratory failure with hypoxia [facility]) ICD-10-CM: J96.21 
ICD-9-CM: 518.84, 799.02  3/8/2020 Unknown Oxygen being weaned DNR (do not resuscitate) (Chronic) ICD-10-CM: D24 ICD-9-CM: V49.86  2/26/2020 Yes Pt's request   
 Acute renal failure on dialysis [facility]) ICD-10-CM: N17.9, Z99.2 ICD-9-CM: 584.9, V45.11  2/25/2020 Yes ESRD-on HD schedule, per nephrology Hypotension (Chronic) ICD-10-CM: I95.9 ICD-9-CM: 458.9  2/18/2020 Yes Resolving Pneumonia due to infectious organism ICD-10-CM: J18.9 ICD-9-CM: 136.9, 484.8  2/14/2020 Unknown On cefepime and Vanc HIT (heparin-induced thrombocytopenia) (HCC) (Chronic) ICD-10-CM: Q73.74 ICD-9-CM: 289.84  1/23/2020 Yes On coumadin, INR 2.5 Atrial fibrillation (HCC) (Chronic) ICD-10-CM: I48.91 
ICD-9-CM: 427.31  1/13/2020 Yes Paced now. CAD (coronary artery disease) (Chronic) ICD-10-CM: I25.10 ICD-9-CM: 414.00  1/10/2020 Yes S/p CABG   
 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 
ICD-9-CM: 250.40, 583.81  8/26/2019 Yes SSI  Obesity, morbid ([facility] (Chronic) ICD-10-CM: E66.01 
ICD-9-CM: 278.01  10/19/2018 Yes  
 BMI > 40  
   
 HLD (hyperlipidemia) (Chronic) ICD-10-CM: D32.4 ICD-9-CM: 272.4  1/14/2013 Yes Chronic Plan:  (Medical Decision Making) --cPAP nightly 
--encourage OOB to chair today, monitoring BP (seems improved now, even despite fluid removal yesterday). IS should be encouraged. --appreciate efforts to remove volume with dialysis, which seems to be helping with hypoxia. --continue albuterol on a scheduled basis, which he says alleviates dyspnea 
--ID following, on Cefepime and Vanc 3/6- 
--remains on warfarin for LILIBETH More than 50% of the time documented was spent in face-to-face contact with the patient and in the care of the patient on the floor/unit where the patient is located.  
 
Praneeth Reynolds MD

## 2020-03-11 NOTE — PROGRESS NOTES
Date of Outreach Update: 
Mayco Leslie was seen and assessed. Pt awake, alert, oriented x 4, very anxious and depressed. Lung sounds crackles in the RUL, decreased bilaterally. O2 sats 96%, HR 86. Wound care seeing patient at this time. MEWS Score: 1 (03/11/20 1128) Vitals:  
 03/11/20 0524 03/11/20 0734 03/11/20 0735 03/11/20 1128 BP:  145/55  137/60 Pulse:  71  80 Resp:  20  18 Temp:  97.5 °F (36.4 °C)  98.2 °F (36.8 °C) SpO2:  97% 95% 95% Weight: 115.7 kg (255 lb) Height:      
  
 
 Pain Assessment Pain Intensity 1: 0 (03/11/20 0735) Pain Location 1: Hand, Wrist, Other (comment)(Left) Patient Stated Pain Goal: 0 Previous Outreach assessment has been reviewed. There have been no significant clinical changes since the completion of the last dated Outreach assessment. Will continue to follow up per outreach protocol.  
 
Signed By:   Andree Hinds RN 
  March 11, 2020 1:19 PM

## 2020-03-11 NOTE — PROGRESS NOTES
Pt complaints of SOB. Pt sat 88-89% on 6  l HF NC. Pt increased to 7  l HF NC with sat 91% at this time. HOB elevated will monitor.

## 2020-03-11 NOTE — WOUND CARE
Follow up for wounds. Coccyx gluteal fold wound improved, continue NS moist to dry dressings with foam dressing on outer layer. On low air loss mattress. Left thigh fully healed, dressing orders disontinued. Left 4th and 5th toes fully healed. Left 1st, 2nd, 3rd toes black mummified dry necrosis at 1st phalangeal area. and all pink/ slough areas have healed. Right 5th toe fully healed. Right 1-4th toes with black mummified dry necrosis at 1st Phalangeal area. Recommend to now pain with betadine and use dry dressing daily. Will monitor.

## 2020-03-11 NOTE — PROGRESS NOTES
2 mg IV Morphine given for SOB. Pt now resting in bed with eyes closed. And no s/sx of SOB at this time. Will monitor.

## 2020-03-11 NOTE — PROGRESS NOTES
Renal Progress Note Admission Date: 2/13/2020 Subjective:  
Patient seen and examined on rounds, complaints of shortness of breath, anxious requiring 7L O2 via NC. Labs and chart reviewed ROS: 
General: no fever/ chills, appetite ok Lung: + SOB 
CV: no CP 
GI: no N/V/D Ext: right wrist edema Objective:  
 
Physical Exam:   
Patient Vitals for the past 8 hrs: 
 BP Temp Pulse Resp SpO2 Weight 03/11/20 1128 137/60 98.2 °F (36.8 °C) 80 18 95 %   
03/11/20 0735     95 %   
03/11/20 0734 145/55 97.5 °F (36.4 °C) 71 20 97 %   
03/11/20 0524      115.7 kg (255 lb)  
03/11/20 0515 151/85 98.1 °F (36.7 °C) (!) 106 19 98 %   
03/11/20 0457     96 %  Gen: alert and oriented, NAD HEENT: MMM 
CV: regular rate, S1, S2, no Rub Lungs: Clearer bilaterally with expiratory wheezing, low lung volumes Abd: soft, non tender, + BS Extem: no LE edema, right wrist trace edema, left foot necrotic toes Access: Left TCC- intact Current Facility-Administered Medications Medication Dose Route Frequency  Vancomycin Intermittent Dosing   Other PRN  
 warfarin (COUMADIN) tablet 5 mg  5 mg Oral ONCE  
 [START ON 3/12/2020] warfarin (COUMADIN) tablet 6 mg  6 mg Oral QPM  
 insulin lispro (HUMALOG) injection   SubCUTAneous AC&HS  
 dextrose 40% (GLUTOSE) oral gel 1 Tube  15 g Oral PRN  
 glucagon (GLUCAGEN) injection 1 mg  1 mg IntraMUSCular PRN  
 dextrose (D50W) injection syrg 12.5-25 g  25-50 mL IntraVENous PRN  
 HYDROcodone-homatropine (HYCODAN) 5-1.5 mg/5 mL (5 mL) syrup 5 mL  5 mL Oral Q4H PRN  
 benzonatate (TESSALON) capsule 100 mg  100 mg Oral TID PRN  
 guaiFENesin ER (MUCINEX) tablet 1,200 mg  1,200 mg Oral Q12H  
 albuterol (PROVENTIL VENTOLIN) nebulizer solution 2.5 mg  2.5 mg Nebulization Q6H RT  
 insulin glargine (LANTUS) injection 5 Units  5 Units SubCUTAneous QHS  mirtazapine (REMERON) tablet 7.5 mg  7.5 mg Oral QHS  sertraline (ZOLOFT) tablet 25 mg  25 mg Oral DAILY  ALPRAZolam (XANAX) tablet 0.25 mg  0.25 mg Oral Q6H PRN  
 busPIRone (BUSPAR) tablet 5 mg  5 mg Oral TID  sodium chloride (OCEAN) 0.65 % nasal squeeze bottle 2 Spray  2 Spray Both Nostrils Q2H PRN  
 morphine injection 2 mg  2 mg IntraVENous Q6H PRN  
 cefepime (MAXIPIME) 2 g in 0.9% sodium chloride (MBP/ADV) 100 mL  2 g IntraVENous Q12H  
 sodium chloride 3% hypertonic nebulizer soln  4 mL Nebulization BID RT  
 sodium chloride (OCEAN) 0.65 % nasal squeeze bottle 2 Spray  2 Spray Both Nostrils BID  epoetin maritza-epbx (RETACRIT) 14,000 Units combo injection  14,000 Units SubCUTAneous Q7D  
 polyethylene glycol (MIRALAX) packet 17 g  17 g Oral DAILY  midodrine (PROAMITINE) tablet 10 mg  10 mg Oral TID WITH MEALS  tamsulosin (FLOMAX) capsule 0.4 mg  0.4 mg Oral QHS  loperamide (IMODIUM) capsule 4 mg  4 mg Oral QID PRN  pantothenic ac-min oil-pet,hyd (AQUAPHOR) 41 % ointment   Topical DAILY  traMADol (ULTRAM) tablet 50 mg  50 mg Oral Q6H PRN  
 sodium chloride (NS) flush 5-40 mL  5-40 mL IntraVENous PRN  
 acetaminophen (TYLENOL) tablet 650 mg  650 mg Oral Q4H PRN  
 naloxone (NARCAN) injection 0.4 mg  0.4 mg IntraVENous PRN  
 ondansetron (ZOFRAN) injection 4 mg  4 mg IntraVENous Q4H PRN  
 bisacodyL (DULCOLAX) tablet 5 mg  5 mg Oral DAILY PRN Data Review:  
 
LABS:  
Recent Results (from the past 12 hour(s)) GLUCOSE, POC Collection Time: 03/11/20  6:03 AM  
Result Value Ref Range Glucose (POC) 118 (H) 65 - 100 mg/dL PROTHROMBIN TIME + INR Collection Time: 03/11/20  7:02 AM  
Result Value Ref Range Prothrombin time 34.7 (H) 12.0 - 14.7 sec INR 3.3 METABOLIC PANEL, BASIC Collection Time: 03/11/20  7:02 AM  
Result Value Ref Range Sodium 140 136 - 145 mmol/L Potassium 4.3 3.5 - 5.1 mmol/L Chloride 107 98 - 107 mmol/L  
 CO2 26 21 - 32 mmol/L  Anion gap 7 7 - 16 mmol/L  
 Glucose 121 (H) 65 - 100 mg/dL BUN 19 8 - 23 MG/DL Creatinine 1.72 (H) 0.8 - 1.5 MG/DL  
 GFR est AA 50 (L) >60 ml/min/1.73m2 GFR est non-AA 42 (L) >60 ml/min/1.73m2 Calcium 7.9 (L) 8.3 - 10.4 MG/DL  
CBC W/O DIFF Collection Time: 03/11/20  7:02 AM  
Result Value Ref Range WBC 7.7 4.3 - 11.1 K/uL  
 RBC 2.84 (L) 4.23 - 5.6 M/uL HGB 8.3 (L) 13.6 - 17.2 g/dL HCT 27.2 (L) 41.1 - 50.3 % MCV 95.8 79.6 - 97.8 FL  
 MCH 29.2 26.1 - 32.9 PG  
 MCHC 30.5 (L) 31.4 - 35.0 g/dL  
 RDW 15.0 (H) 11.9 - 14.6 % PLATELET 171 426 - 003 K/uL MPV 9.8 9.4 - 12.3 FL ABSOLUTE NRBC 0.00 0.0 - 0.2 K/uL Plan: Active Problems: 
  DM type 2 (diabetes mellitus, type 2) ([facility] (1/14/2013) HLD (hyperlipidemia) (1/14/2013) Obesity, morbid ([facility] (10/19/2018) Type 2 diabetes with nephropathy ([facility] (8/26/2019) CAD (coronary artery disease) (1/10/2020) S/P CABG x 3 (1/10/2020) S/P AVR (1/10/2020) Atrial fibrillation ([facility] (1/13/2020) HIT (heparin-induced thrombocytopenia) ([facility] (1/23/2020) Pneumonia due to infectious organism (2/14/2020) Hypotension (2/18/2020) Acute renal failure on dialysis ([facility] (2/25/2020) DNR (do not resuscitate) (2/26/2020) Acute on chronic respiratory failure with hypoxia ([facility] (3/8/2020) DEJUAN/CKD 
- ([facility] TTS schedule) -monitoring for renal recovery 
-next HD tomorrow for volume and clearance, challenge UF as tolerated with HD 
  
HIT/SHARON + 
  
ASHD s/p CABG 
  
Orthostatic Hypotension - on midodrine 
  
 Anemia - s/p IV Fe replacement and shelton Acute respiratory failure,  UF as tolerated 
- albuterol and CPAP @[facility] pulmonary following 
-on cefepime and vancomycin for HCAP per ID

## 2020-03-11 NOTE — PROGRESS NOTES
Pt sitting up in bed. Pt reports anxiety is better at this time. Pt on 6 L HF NC at this time. Pt complaints of itching but no scratching at this time. Pt encouraged to call for assistance if needed call light in reach, door open will monitor.

## 2020-03-11 NOTE — PROGRESS NOTES
Pt resting in bed with eyes closed and BIPAP in place. Pt awakens and request BIPAP removed and pt placed on 6  l HF NC at this time. Pt denies pain or distress at this time. Wound to left thigh, sacrum and toes. Pt encouraged to call for assistance if needed call light in reach, door open will monitor.

## 2020-03-11 NOTE — PROGRESS NOTES
Hospitalist Note Admit Date:  2020  3:03 PM  
Name:  Josh Sanchez Age:  69 y.o. 
:  1945 MRN:  [id number] PCP:  Mimi Ridley MD 
Treatment Team: Attending Provider: Tad Maldonado MD; Consulting Provider: Lanie Neil MD; Utilization Review: Carole Bernard RN; Hospitalist: Santiago Harrell NP; Hospitalist: Jeremy Frazier NP; Consulting Provider: Deborah Yin MD; Consulting Provider: Latanya Hollis MD; Care Manager: Ayde Carter.; Utilization Review: Dipika Whitman; Consulting Provider: Andreas Simons MD; Consulting Provider: Malena Puga MD; Primary Nurse: Baltazar Contreras RN 
 
HPI/Subjective:  
67-year-old male admitted with acute hypoxemic respiratory failure and progressive left lower lobe consolidation. Infectious disease and pulmonology are following and applied the patient on vancomycin and cefepime for failure of levofloxacin therapy as an outpatient. Patient had a bronchoscopy on  per pulmonology and that final result is pending. Patient recently underwent CABG and aortic valve repair in 2020 and had resultant atrial fibrillation. Post CABG patient developed renal failure and is requiring hemodialysis for renal clearance. 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 Patient diagnosed with pneumonia post CABG and discharged home with levofloxacin. Today: Patient with multiple complaints about food, nursing staff etc.  Patient states that his respirations are unchanged. Case management states the patient was accepted at [facility]. Nephrology following for renal clearance and continue to pull fluid to improve pulmonary edema. Pulmonology following and treating the patient's pneumonia. Infectious disease following and recommend continue broad spectrum antimicrobial therapy until 3/13/2020. Objective:  
 
Patient Vitals for the past 24 hrs: 
 Temp Pulse Resp BP SpO2  
20 1537 97.7 °F (36.5 °C) 77 20 130/65 97 % 03/11/20 1329     93 % 03/11/20 1128 98.2 °F (36.8 °C) 80 18 137/60 95 % 03/11/20 0735     95 % 03/11/20 0734 97.5 °F (36.4 °C) 71 20 145/55 97 % 03/11/20 0515 98.1 °F (36.7 °C) (!) 106 19 151/85 98 % 03/11/20 0457     96 % 03/11/20 0154     98 % 03/10/20 2356     99 % 03/10/20 2305 97.8 °F (36.6 °C) (!) 59 20 114/55 98 % 03/10/20 2011     97 % 03/10/20 1942 97.7 °F (36.5 °C) 77 19 135/65 98 % Oxygen Therapy O2 Sat (%): 97 % (03/11/20 1537) Pulse via Oximetry: 82 beats per minute (03/11/20 1329) O2 Device: Nasal cannula (03/11/20 1329) O2 Flow Rate (L/min): 6 l/min (03/11/20 1329) O2 Temperature: 87.8 °F (31 °C) (03/10/20 0618) FIO2 (%): 35 % (03/11/20 0457) Estimated body mass index is 37.25 kg/m² as calculated from the following: 
  Height as of this encounter: 5' 10\" (1.778 m). Weight as of this encounter: 117.8 kg (259 lb 9.6 oz). Intake/Output Summary (Last 24 hours) at 3/11/2020 1833 Last data filed at 3/11/2020 1806 Gross per 24 hour Intake 480 ml Output 525 ml Net -45 ml *Note that automatically entered I/Os may not be accurate; dependent on patient compliance with collection and accurate  by techs. Physical Exam:  
General:     alert, awake, no acute distress. Obese. Head:   normocephalic, atraumatic Eyes, Ears, nose: PERRL, EOMI. NC Neck:    supple, non-tender Lungs:   Wheezing Cardiac:   RRR, Normal S1 and S2. Abdomen:   Soft, non distended, nontender, +BS Extremities:   Warm, dry. B/l stasis dermatitis with mild edema. Left forearm and hand swelling Skin:   No rashes, no jaundice Neuro:  Alert and oriented to person, time, place, situation. No gross focal neurological deficit Psychiatric:  No anxiety, calm, cooperative Data Review: 
I have reviewed all labs, meds, and studies from the last 24 hours: 
 
Recent Results (from the past 24 hour(s)) GLUCOSE, POC  Collection Time: 03/10/20  8:22 PM  
 Result Value Ref Range Glucose (POC) 305 (H) 65 - 100 mg/dL GLUCOSE, POC Collection Time: 03/11/20  6:03 AM  
Result Value Ref Range Glucose (POC) 118 (H) 65 - 100 mg/dL PROTHROMBIN TIME + INR Collection Time: 03/11/20  7:02 AM  
Result Value Ref Range Prothrombin time 34.7 (H) 12.0 - 14.7 sec INR 3.3 METABOLIC PANEL, BASIC Collection Time: 03/11/20  7:02 AM  
Result Value Ref Range Sodium 140 136 - 145 mmol/L Potassium 4.3 3.5 - 5.1 mmol/L Chloride 107 98 - 107 mmol/L  
 CO2 26 21 - 32 mmol/L Anion gap 7 7 - 16 mmol/L Glucose 121 (H) 65 - 100 mg/dL BUN 19 8 - 23 MG/DL Creatinine 1.72 (H) 0.8 - 1.5 MG/DL  
 GFR est AA 50 (L) >60 ml/min/1.73m2 GFR est non-AA 42 (L) >60 ml/min/1.73m2 Calcium 7.9 (L) 8.3 - 10.4 MG/DL  
CBC W/O DIFF Collection Time: 03/11/20  7:02 AM  
Result Value Ref Range WBC 7.7 4.3 - 11.1 K/uL  
 RBC 2.84 (L) 4.23 - 5.6 M/uL HGB 8.3 (L) 13.6 - 17.2 g/dL HCT 27.2 (L) 41.1 - 50.3 % MCV 95.8 79.6 - 97.8 FL  
 MCH 29.2 26.1 - 32.9 PG  
 MCHC 30.5 (L) 31.4 - 35.0 g/dL  
 RDW 15.0 (H) 11.9 - 14.6 % PLATELET 854 267 - 523 K/uL MPV 9.8 9.4 - 12.3 FL ABSOLUTE NRBC 0.00 0.0 - 0.2 K/uL GLUCOSE, POC Collection Time: 03/11/20 11:45 AM  
Result Value Ref Range Glucose (POC) 195 (H) 65 - 100 mg/dL Leticia Mcgarry Collection Time: 03/11/20 11:50 AM  
Result Value Ref Range Vancomycin, random 9.5 UG/ML  
GLUCOSE, POC Collection Time: 03/11/20  4:33 PM  
Result Value Ref Range Glucose (POC) 250 (H) 65 - 100 mg/dL All Micro Results Procedure Component Value Units Date/Time CULTURE, RESPIRATORY/SPUTUM/BRONCH Gage Lemus [[id number]] Collected:  03/09/20 8438 Order Status:  Completed Specimen:  Sputum Updated:  03/11/20 9087 Special Requests: NO SPECIAL REQUESTS     
  GRAM STAIN 10 TO 50 WBC'S/OIF  
   0 TO 2 EPITHELIAL CELLS/OIF  
   FEW GRAM POSITIVE RODS MODERATE YEAST 4+ MUCUS PRESENT Culture result: MODERATE YEAST IDENTIFICATION TO FOLLOW CULTURE, BLOOD [[id number]] Collected:  03/06/20 0944 Order Status:  Completed Specimen:  Blood Updated:  03/11/20 5367 Special Requests: --     
  RIGHT Antecubital 
  
  Culture result: NO GROWTH 5 DAYS     
 CULTURE, BLOOD [[id number]] Collected:  03/06/20 3772 Order Status:  Completed Specimen:  Blood Updated:  03/11/20 7676 Special Requests: --     
  LEFT Antecubital 
  
  Culture result: NO GROWTH 5 DAYS     
 CULTURE, RESPIRATORY/SPUTUM/BRONCH Cary Bracket STAIN [[id number]] Collected:  03/06/20 1600 Order Status:  Canceled Specimen:  Sputum C. DIFFICILE AG & TOXIN A/B [[id number]] Order Status:  Canceled Specimen:  Stool CULTURE, RESPIRATORY/SPUTUM/BRONCH Orlene Blades [[id number]] Collected:  02/17/20 1220 Order Status:  Completed Specimen:  Sputum from Bronchial Washing Updated:  02/24/20 1244 Special Requests: NO SPECIAL REQUESTS     
  GRAM STAIN 20 TO 35 WBCS SEEN PER OIF  
   1 TO 2 EPITHELIAL CELLS SEEN PER OIF  
      
  MODERATE GRAM POSITIVE COCCI  
     
   FEW GRAM POSITIVE RODS Culture result:    
  LIGHT NORMAL RESPIRATORY BRENNAN Current Meds: 
Current Facility-Administered Medications Medication Dose Route Frequency  Vancomycin Intermittent Dosing   Other PRN  
 [START ON 3/12/2020] warfarin (COUMADIN) tablet 6 mg  6 mg Oral QPM  
 insulin lispro (HUMALOG) injection   SubCUTAneous AC&HS  
 dextrose 40% (GLUTOSE) oral gel 1 Tube  15 g Oral PRN  
 glucagon (GLUCAGEN) injection 1 mg  1 mg IntraMUSCular PRN  
 dextrose (D50W) injection syrg 12.5-25 g  25-50 mL IntraVENous PRN  
 HYDROcodone-homatropine (HYCODAN) 5-1.5 mg/5 mL (5 mL) syrup 5 mL  5 mL Oral Q4H PRN  
 benzonatate (TESSALON) capsule 100 mg  100 mg Oral TID PRN  
 guaiFENesin ER (MUCINEX) tablet 1,200 mg  1,200 mg Oral Q12H  albuterol (PROVENTIL VENTOLIN) nebulizer solution 2.5 mg  2.5 mg Nebulization Q6H RT  
 insulin glargine (LANTUS) injection 5 Units  5 Units SubCUTAneous QHS  mirtazapine (REMERON) tablet 7.5 mg  7.5 mg Oral QHS  sertraline (ZOLOFT) tablet 25 mg  25 mg Oral DAILY  ALPRAZolam (XANAX) tablet 0.25 mg  0.25 mg Oral Q6H PRN  
 busPIRone (BUSPAR) tablet 5 mg  5 mg Oral TID  sodium chloride (OCEAN) 0.65 % nasal squeeze bottle 2 Spray  2 Spray Both Nostrils Q2H PRN  
 morphine injection 2 mg  2 mg IntraVENous Q6H PRN  
 cefepime (MAXIPIME) 2 g in 0.9% sodium chloride (MBP/ADV) 100 mL  2 g IntraVENous Q12H  
 sodium chloride 3% hypertonic nebulizer soln  4 mL Nebulization BID RT  
 sodium chloride (OCEAN) 0.65 % nasal squeeze bottle 2 Spray  2 Spray Both Nostrils BID  epoetin maritza-epbx (RETACRIT) 14,000 Units combo injection  14,000 Units SubCUTAneous Q7D  
 polyethylene glycol (MIRALAX) packet 17 g  17 g Oral DAILY  midodrine (PROAMITINE) tablet 10 mg  10 mg Oral TID WITH MEALS  tamsulosin (FLOMAX) capsule 0.4 mg  0.4 mg Oral QHS  loperamide (IMODIUM) capsule 4 mg  4 mg Oral QID PRN  pantothenic ac-min oil-pet,hyd (AQUAPHOR) 41 % ointment   Topical DAILY  traMADol (ULTRAM) tablet 50 mg  50 mg Oral Q6H PRN  
 sodium chloride (NS) flush 5-40 mL  5-40 mL IntraVENous PRN  
 acetaminophen (TYLENOL) tablet 650 mg  650 mg Oral Q4H PRN  
 naloxone (NARCAN) injection 0.4 mg  0.4 mg IntraVENous PRN  
 ondansetron (ZOFRAN) injection 4 mg  4 mg IntraVENous Q4H PRN  
 bisacodyL (DULCOLAX) tablet 5 mg  5 mg Oral DAILY PRN Other Studies: 
 
Ct Chest W Cont Result Date: 2/14/2020 CTA OF THE CHEST - PE STUDY HISTORY: Acute shortness of breath COMPARISON: None TECHNIQUE: A helical acquisition was performed through the chest utilizing 5.59UR slice thickness during the infusion of 100 cc of Isovue-370. 3-D post-processed images were created on an independent workstation. Multiplanar reformats were obtained. The exam was focused on the pulmonary arteries. Dose reduction techniques used: Automated exposure control, adjustment of the mAs and/or kVp according to patient's size, and iterative reconstruction techniques. FINDINGS: *  PULMONARY VESSELS: No evidence of pulmonary embolism. *  PLEURA / PERICARDIUM: Left pleural effusion. *  CAROL / MEDIASTINUM: Dilated diffuse fluid-filled esophagus. No evidence of a hiatal hernia. *  LUNGS: Consolidation of the entire left lower lobe. Linear atelectasis of the right middle and right upper lobe. *  TRACHEOBRONCHIAL TREE: Within normal limits. *  AORTA: Within normal limits. *  CORONARY ARTERIES: Extensive coronary artery calcification is seen. *  CHEST WALL/AXILLA: Within normal limits. *  VISUALIZED UPPER ABDOMEN: Within normal limits. *  SPINE / BONES: Status post CABG. *  ADDITIONAL COMMENTS: None. IMPRESSION: No evidence of pulmonary embolism. Left lower lobe atelectasis or pneumonia with a left pleural effusion. Linear atelectasis of the right middle and right upper lobes. Dilated diffuse fluid-filled esophagus. No evidence of a hiatal hernia or obstructing mass. I question of the patient could have gastroesophageal reflux disease. Coronary artery disease status post CABG. Date of Dictation: 2/14/2020 12:25 AM 
 
Xr Chest Samson Skill Result Date: 2/13/2020 EXAMINATION: CHEST RADIOGRAPH 2/13/2020 3:55 PM ACCESSION NUMBER: [id number] INDICATION: shob COMPARISON: Chest x-ray 2/8/2020 TECHNIQUE: A single AP view of the chest was obtained. FINDINGS: Support Lines and Tubes: Unchanged cardiac pacemaker positioning. Unchanged left central venous catheter positioning. Cardiac Silhouette: Unchanged enlargement of the cardiac silhouette. Lungs: Unchanged left basilar opacity, moderate left pleural effusion. Improving interstitial edema. Pleura: No pneumothorax. Osseous Structures: Prior median sternotomy. Upper Abdomen: Unremarkable. IMPRESSION: 1. Improving interstitial edema. 2. Unchanged left basilar opacity and left pleural effusion. 3. Unchanged enlargement of the cardiac silhouette. VOICE DICTATED BY: Dr. Giacomo Miramontes Assessment and Plan:  
 
Hospital Problems as of 3/11/2020 Date Reviewed: 3/9/2020 Codes Class Noted - Resolved POA Acute on chronic respiratory failure with hypoxia [facility]) ICD-10-CM: Q12.67 
ICD-9-CM: 518.84, 799.02  3/8/2020 - Present Unknown DNR (do not resuscitate) (Chronic) ICD-10-CM: G80 ICD-9-CM: V49.86  2/26/2020 - Present Yes Acute renal failure on dialysis [facility]) ICD-10-CM: N17.9, Z99.2 ICD-9-CM: 584.9, V45.11  2/25/2020 - Present Yes Hypotension (Chronic) ICD-10-CM: I95.9 ICD-9-CM: 458.9  2/18/2020 - Present Yes Pneumonia due to infectious organism ICD-10-CM: J18.9 ICD-9-CM: 136.9, 484.8  2/14/2020 - Present Unknown HIT (heparin-induced thrombocytopenia) (HCC) (Chronic) ICD-10-CM: R32.24 ICD-9-CM: 289.84  1/23/2020 - Present Yes Atrial fibrillation (HCC) (Chronic) ICD-10-CM: I48.91 
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 
ICD-9-CM: 250.40, 583.81  8/26/2019 - Present Yes Obesity, morbid ([address] (Chronic) ICD-10-CM: E66.01 
ICD-9-CM: 278.01  10/19/2018 - Present Yes DM type 2 (diabetes mellitus, type 2) (HCC) (Chronic) ICD-10-CM: E11.9 ICD-9-CM: 250.00  1/14/2013 - Present Yes HLD (hyperlipidemia) (Chronic) ICD-10-CM: S70.3 ICD-9-CM: 272.4  1/14/2013 - Present Yes RESOLVED: Acute-on-chronic kidney injury ([facility] ICD-10-CM: N17.9, N18.9 ICD-9-CM: 584.9, 585.9  2/17/2020 - 2/25/2020 Yes RESOLVED: Atelectasis ICD-10-CM: J98.11 ICD-9-CM: 518.0  2/17/2020 - 2/25/2020 Yes RESOLVED: Fluid overload ICD-10-CM: E87.70 ICD-9-CM: 276.69  2/15/2020 - 2/25/2020 Yes RESOLVED: Pleural effusion ICD-10-CM: J90 ICD-9-CM: 511.9  2/3/2020 - 2/25/2020 Yes * (Principal) RESOLVED: Acute hypoxemic respiratory failure ([facility] ICD-10-CM: J96.01 
ICD-9-CM: 518.81  1/10/2020 - 2/25/2020 Yes RESOLVED: HTN (hypertension) ICD-10-CM: I10 
ICD-9-CM: 401.9  1/14/2013 - 2/25/2020 Yes Plan: 
Acute hypoxemic respiratory failure with Progressive LLL consolidation - ID on board: back on abx, vanc/cefepime EOT 3/13/2020.  
- S/p bronch 2/17. Pulm following. 
  
LUE pain 
- US : no DVT but Thrombosed superficial branch of the left cephalic vein. DEJUAN now on HD 
- HD per nephro 
  
DM2 
- sliding scale 
- holding Basal/prandial insulins with sugars reasonable though occasional spikes throughout the day 
  
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 
- Warfarin 
  
CAD S/p CABG/AVR January 2020 AFib 
- Home meds. 
  
MDD - Per tele psych 
 - Decrease Buspar to 5mg TID x3 days (or 9 doses) then stop 
 - trazodone dc(suspected contributing to orthostatic hypotension?) - Start Remeron 7.5mg PO qhs; start Zoloft 25mg daily - DC Klonopin - Decrease Xanax to 0.25mg q6 prn Diet:  DIET DIABETIC CONSISTENT CARB 
DIET NUTRITIONAL SUPPLEMENTS 
DVT PPx: warfarin Code: DNR Signed By: Elisha Escobar MD   
 March 11, 2020

## 2020-03-12 NOTE — PROGRESS NOTES
Hospitalist Note Admit Date:  2020  3:03 PM  
Name:  Elayne Rod Age:  69 y.o. 
:  1945 MRN:  [id number] PCP:  Aurora Macias MD 
Treatment Team: Attending Provider: Corwin Fabry, MD; Consulting Provider: Hebert Carr MD; Utilization Review: Jeyson Bell RN; Hospitalist: Naima Najera NP; Hospitalist: Demetria Colby NP; Consulting Provider: Deborah Yin MD; Consulting Provider: Mihai Whitman MD; Care Manager: Kapil Dumont.; Utilization Review: Иван Wallace; Consulting Provider: Lay Castillo MD; Consulting Provider: Yaneth Courtney MD; Primary Nurse: Anish Caraballo RN 
 
HPI/Subjective:  
67-year-old male admitted with acute hypoxemic respiratory failure and progressive left lower lobe consolidation. Infectious disease and pulmonology are following and applied the patient on vancomycin and cefepime for failure of levofloxacin therapy as an outpatient. Patient had a bronchoscopy on  per pulmonology and that final result is pending. Patient recently underwent CABG and aortic valve repair in 2020 and had resultant atrial fibrillation. Post CABG patient developed renal failure and is requiring hemodialysis for renal clearance. 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 Patient diagnosed with pneumonia post CABG and discharged home with levofloxacin. Today: No complaints or concerns today. Patient states that his respirations are improved. Pending placement to LTAC. Objective:  
 
Patient Vitals for the past 24 hrs: 
 Temp Pulse Resp BP SpO2  
20 1501  63  (!) 108/91   
20 1431  65  (!) 136/108   
20 1358  77  123/72   
20 1333  75  134/73   
20 1120     94 % 20 1108 98.1 °F (36.7 °C) 85 22 139/67 94 % 20 0743 97.6 °F (36.4 °C) 75 22 129/66 97 % 20 0407 97.4 °F (36.3 °C) 75 18 116/63 100 % 20 0328 98.1 °F (36.7 °C) 64 17 120/68 95 % 03/12/20 0131     96 % 03/11/20 2320 98 °F (36.7 °C) 68 18 122/69 97 % 03/11/20 2144     96 % 03/11/20 2100     96 % 03/11/20 1920 98.1 °F (36.7 °C) 71 19 135/73 96 % 03/11/20 1537 97.7 °F (36.5 °C) 77 20 130/65 97 % Oxygen Therapy O2 Sat (%): 94 % (03/12/20 1120) Pulse via Oximetry: 76 beats per minute (03/12/20 0131) O2 Device: CPAP mask (03/12/20 0131) PEEP/CPAP (cm H2O): 14 cm H20 (03/12/20 1120) O2 Flow Rate (L/min): 6 l/min (03/11/20 1329) O2 Temperature: 87.8 °F (31 °C) (03/10/20 0618) FIO2 (%): 35 % (03/12/20 0131) Estimated body mass index is 36.72 kg/m² as calculated from the following: 
  Height as of this encounter: 5' 10\" (1.778 m). Weight as of this encounter: 116.1 kg (255 lb 14.4 oz). Intake/Output Summary (Last 24 hours) at 3/12/2020 1518 Last data filed at 3/12/2020 3621 Gross per 24 hour Intake 358 ml Output 500 ml Net -142 ml *Note that automatically entered I/Os may not be accurate; dependent on patient compliance with collection and accurate  by techs. Physical Exam:  
General:     alert, awake, no acute distress. Obese. Head:   normocephalic, atraumatic Eyes, Ears, nose: PERRL, EOMI. NC Neck:    supple, non-tender Lungs:   Wheezing Cardiac:   RRR, Normal S1 and S2. Abdomen:   Soft, non distended, nontender, +BS Extremities:   Warm, dry. B/l stasis dermatitis with mild edema. Left forearm and hand swelling Skin:   No rashes, no jaundice Neuro:  Alert and oriented to person, time, place, situation. No gross focal neurological deficit Psychiatric:  No anxiety, calm, cooperative Data Review: 
I have reviewed all labs, meds, and studies from the last 24 hours: 
 
Recent Results (from the past 24 hour(s)) GLUCOSE, POC Collection Time: 03/11/20  4:33 PM  
Result Value Ref Range Glucose (POC) 250 (H) 65 - 100 mg/dL GLUCOSE, POC Collection Time: 03/11/20  8:50 PM  
Result Value Ref Range Glucose (POC) 243 (H) 65 - 100 mg/dL Naty Das Collection Time: 03/12/20  3:51 AM  
Result Value Ref Range Vancomycin, random 31.1 UG/ML  
GLUCOSE, POC Collection Time: 03/12/20  5:58 AM  
Result Value Ref Range Glucose (POC) 146 (H) 65 - 100 mg/dL PROTHROMBIN TIME + INR Collection Time: 03/12/20  9:55 AM  
Result Value Ref Range Prothrombin time 42.7 (H) 12.0 - 14.7 sec INR 4.3 (HH) METABOLIC PANEL, BASIC Collection Time: 03/12/20  9:55 AM  
Result Value Ref Range Sodium 142 136 - 145 mmol/L Potassium 4.3 3.5 - 5.1 mmol/L Chloride 108 (H) 98 - 107 mmol/L  
 CO2 30 21 - 32 mmol/L Anion gap 4 (L) 7 - 16 mmol/L Glucose 183 (H) 65 - 100 mg/dL BUN 23 8 - 23 MG/DL Creatinine 2.19 (H) 0.8 - 1.5 MG/DL  
 GFR est AA 38 (L) >60 ml/min/1.73m2 GFR est non-AA 32 (L) >60 ml/min/1.73m2 Calcium 8.2 (L) 8.3 - 10.4 MG/DL  
CBC W/O DIFF Collection Time: 03/12/20  9:55 AM  
Result Value Ref Range WBC 7.7 4.3 - 11.1 K/uL  
 RBC 2.81 (L) 4.23 - 5.6 M/uL HGB 8.2 (L) 13.6 - 17.2 g/dL HCT 26.9 (L) 41.1 - 50.3 % MCV 95.7 79.6 - 97.8 FL  
 MCH 29.2 26.1 - 32.9 PG  
 MCHC 30.5 (L) 31.4 - 35.0 g/dL  
 RDW 15.1 (H) 11.9 - 14.6 % PLATELET 935 874 - 759 K/uL MPV 10.1 9.4 - 12.3 FL ABSOLUTE NRBC 0.00 0.0 - 0.2 K/uL GLUCOSE, POC Collection Time: 03/12/20 11:17 AM  
Result Value Ref Range Glucose (POC) 220 (H) 65 - 100 mg/dL All Micro Results Procedure Component Value Units Date/Time CULTURE, RESPIRATORY/SPUTUM/BRONCH [place] STAIN [[id number]]  (Abnormal) Collected:  03/09/20 2026 Order Status:  Completed Specimen:  Sputum Updated:  03/12/20 6555 Special Requests: NO SPECIAL REQUESTS     
  GRAM STAIN 10 TO 50 WBC'S/OIF  
   0 TO 2 EPITHELIAL CELLS/OIF  
   FEW GRAM POSITIVE RODS MODERATE YEAST 4+ MUCUS PRESENT Culture result: MODERATE CANDIDA ALBICANS CULTURE, BLOOD [[id number]] Collected:  03/06/20 0944 Order Status:  Completed Specimen:  Blood Updated:  03/11/20 3123 Special Requests: --     
  RIGHT Antecubital 
  
  Culture result: NO GROWTH 5 DAYS     
 CULTURE, BLOOD [[id number]] Collected:  03/06/20 9468 Order Status:  Completed Specimen:  Blood Updated:  03/11/20 7628 Special Requests: --     
  LEFT Antecubital 
  
  Culture result: NO GROWTH 5 DAYS     
 CULTURE, RESPIRATORY/SPUTUM/BRONCH [place] STAIN [[id number]] Collected:  03/06/20 1600 Order Status:  Canceled Specimen:  Sputum C. DIFFICILE AG & TOXIN A/B [[id number]] Order Status:  Canceled Specimen:  Stool CULTURE, RESPIRATORY/SPUTUM/BRONCH [place] [[id number]] Collected:  02/17/20 1220 Order Status:  Completed Specimen:  Sputum from Bronchial Washing Updated:  02/24/20 1244 Special Requests: NO SPECIAL REQUESTS     
  GRAM STAIN 20 TO 35 WBCS SEEN PER OIF  
   1 TO 2 EPITHELIAL CELLS SEEN PER OIF  
      
  MODERATE GRAM POSITIVE COCCI  
     
   FEW GRAM POSITIVE RODS Culture result:    
  LIGHT NORMAL RESPIRATORY BRENNAN Current Meds: 
Current Facility-Administered Medications Medication Dose Route Frequency  Warfarin (Coumadin) on hold   Other PRN  Vancomycin Intermittent Dosing   Other PRN  
 insulin lispro (HUMALOG) injection   SubCUTAneous AC&HS  
 dextrose 40% (GLUTOSE) oral gel 1 Tube  15 g Oral PRN  
 glucagon (GLUCAGEN) injection 1 mg  1 mg IntraMUSCular PRN  
 dextrose (D50W) injection syrg 12.5-25 g  25-50 mL IntraVENous PRN  
 HYDROcodone-homatropine (HYCODAN) 5-1.5 mg/5 mL (5 mL) syrup 5 mL  5 mL Oral Q4H PRN  
 benzonatate (TESSALON) capsule 100 mg  100 mg Oral TID PRN  
 guaiFENesin ER (MUCINEX) tablet 1,200 mg  1,200 mg Oral Q12H  
 albuterol (PROVENTIL VENTOLIN) nebulizer solution 2.5 mg  2.5 mg Nebulization Q6H RT  
 insulin glargine (LANTUS) injection 5 Units  5 Units SubCUTAneous QHS  mirtazapine (REMERON) tablet 7.5 mg  7.5 mg Oral QHS  sertraline (ZOLOFT) tablet 25 mg  25 mg Oral DAILY  ALPRAZolam (XANAX) tablet 0.25 mg  0.25 mg Oral Q6H PRN  
 sodium chloride (OCEAN) 0.65 % nasal squeeze bottle 2 Spray  2 Spray Both Nostrils Q2H PRN  
 morphine injection 2 mg  2 mg IntraVENous Q6H PRN  
 cefepime (MAXIPIME) 2 g in 0.9% sodium chloride (MBP/ADV) 100 mL  2 g IntraVENous Q12H  
 sodium chloride 3% hypertonic nebulizer soln  4 mL Nebulization BID RT  
 sodium chloride (OCEAN) 0.65 % nasal squeeze bottle 2 Spray  2 Spray Both Nostrils BID  epoetin maritza-epbx (RETACRIT) 14,000 Units combo injection  14,000 Units SubCUTAneous Q7D  
 polyethylene glycol (MIRALAX) packet 17 g  17 g Oral DAILY  midodrine (PROAMITINE) tablet 10 mg  10 mg Oral TID WITH MEALS  tamsulosin (FLOMAX) capsule 0.4 mg  0.4 mg Oral QHS  loperamide (IMODIUM) capsule 4 mg  4 mg Oral QID PRN  pantothenic ac-min oil-pet,hyd (AQUAPHOR) 41 % ointment   Topical DAILY  traMADol (ULTRAM) tablet 50 mg  50 mg Oral Q6H PRN  
 sodium chloride (NS) flush 5-40 mL  5-40 mL IntraVENous PRN  
 acetaminophen (TYLENOL) tablet 650 mg  650 mg Oral Q4H PRN  
 naloxone (NARCAN) injection 0.4 mg  0.4 mg IntraVENous PRN  
 ondansetron (ZOFRAN) injection 4 mg  4 mg IntraVENous Q4H PRN  
 bisacodyL (DULCOLAX) tablet 5 mg  5 mg Oral DAILY PRN Other Studies: 
 
Ct Chest W Cont Result Date: 2/14/2020 CTA OF THE CHEST - PE STUDY HISTORY: Acute shortness of breath COMPARISON: None TECHNIQUE: A helical acquisition was performed through the chest utilizing 5.63CF slice thickness during the infusion of 100 cc of Isovue-370. 3-D post-processed images were created on an independent workstation. Multiplanar reformats were obtained. The exam was focused on the pulmonary arteries. Dose reduction techniques used:  Automated exposure control, adjustment of the mAs and/or kVp according to patient's size, and iterative reconstruction techniques. FINDINGS: *  PULMONARY VESSELS: No evidence of pulmonary embolism. *  PLEURA / PERICARDIUM: Left pleural effusion. *  CAROL / MEDIASTINUM: Dilated diffuse fluid-filled esophagus. No evidence of a hiatal hernia. *  LUNGS: Consolidation of the entire left lower lobe. Linear atelectasis of the right middle and right upper lobe. *  TRACHEOBRONCHIAL TREE: Within normal limits. *  AORTA: Within normal limits. *  CORONARY ARTERIES: Extensive coronary artery calcification is seen. *  CHEST WALL/AXILLA: Within normal limits. *  VISUALIZED UPPER ABDOMEN: Within normal limits. *  SPINE / BONES: Status post CABG. *  ADDITIONAL COMMENTS: None. IMPRESSION: No evidence of pulmonary embolism. Left lower lobe atelectasis or pneumonia with a left pleural effusion. Linear atelectasis of the right middle and right upper lobes. Dilated diffuse fluid-filled esophagus. No evidence of a hiatal hernia or obstructing mass. I question of the patient could have gastroesophageal reflux disease. Coronary artery disease status post CABG. Date of Dictation: 2/14/2020 12:25 AM 
 
Xr Chest [facility] Result Date: 2/13/2020 EXAMINATION: CHEST RADIOGRAPH 2/13/2020 3:55 PM ACCESSION NUMBER: [id number] INDICATION: shob COMPARISON: Chest x-ray 2/8/2020 TECHNIQUE: A single AP view of the chest was obtained. FINDINGS: Support Lines and Tubes: Unchanged cardiac pacemaker positioning. Unchanged left central venous catheter positioning. Cardiac Silhouette: Unchanged enlargement of the cardiac silhouette. Lungs: Unchanged left basilar opacity, moderate left pleural effusion. Improving interstitial edema. Pleura: No pneumothorax. Osseous Structures: Prior median sternotomy. Upper Abdomen: Unremarkable. IMPRESSION: 1. Improving interstitial edema. 2. Unchanged left basilar opacity and left pleural effusion. 3. Unchanged enlargement of the cardiac silhouette. VOICE DICTATED BY: Dr. Alireza Leon Assessment and Plan:  
 
Hospital Problems as of 3/12/2020 Date Reviewed: 3/9/2020 Codes Class Noted - Resolved POA Acute on chronic respiratory failure with hypoxia [facility]) ICD-10-CM: O56.14 
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 
ICD-9-CM: 250.40, 583.81  8/26/2019 - Present Yes Obesity, morbid ([address] (Chronic) ICD-10-CM: E66.01 
ICD-9-CM: 278.01  10/19/2018 - Present Yes DM type 2 (diabetes mellitus, type 2) (HCC) (Chronic) ICD-10-CM: E11.9 ICD-9-CM: 250.00  1/14/2013 - Present Yes HLD (hyperlipidemia) (Chronic) ICD-10-CM: E87.4 ICD-9-CM: 272.4  1/14/2013 - Present Yes RESOLVED: Acute-on-chronic kidney injury ([facility] ICD-10-CM: N17.9, N18.9 ICD-9-CM: 584.9, 585.9  2/17/2020 - 2/25/2020 Yes RESOLVED: Atelectasis ICD-10-CM: J98.11 ICD-9-CM: 518.0  2/17/2020 - 2/25/2020 Yes RESOLVED: Fluid overload ICD-10-CM: E87.70 ICD-9-CM: 276.69  2/15/2020 - 2/25/2020 Yes RESOLVED: Pleural effusion ICD-10-CM: J90 ICD-9-CM: 511.9  2/3/2020 - 2/25/2020 Yes * (Principal) RESOLVED: Acute hypoxemic respiratory failure ([facility] [address] ICD-10-CM: J96.01 
ICD-9-CM: 518.81  1/10/2020 - 2/25/2020 Yes RESOLVED: HTN (hypertension) ICD-10-CM: I10 
ICD-9-CM: 401.9  1/14/2013 - 2/25/2020 Yes Plan: 
Acute hypoxemic respiratory failure with Progressive LLL consolidation - ID on board: back on abx, vanc/cefepime EOT 3/13/2020.  
- S/p bronch 2/17. Pulm following. 
  
LUE pain 
- US : no DVT but Thrombosed superficial branch of the left cephalic vein. DEJUAN now on HD 
- HD per nephro 
  
DM2 
- sliding scale 
- holding Basal/prandial insulins with sugars reasonable though occasional spikes throughout the day 
  
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 
- Warfarin 
  
CAD S/p CABG/AVR January 2020 AFib 
- Home meds. 
  
MDD - Per tele psych 
 - Decrease Buspar to 5mg TID x3 days (or 9 doses) then stop 
 - trazodone dc(suspected contributing to orthostatic hypotension?) - Start Remeron 7.5mg PO qhs; start Zoloft 25mg daily - DC Klonopin - Decrease Xanax to 0.25mg q6 prn Diet:  DIET DIABETIC CONSISTENT CARB 
DIET NUTRITIONAL SUPPLEMENTS 
DVT PPx: warfarin Code: DNR Signed By: Anuja Macias MD   
 March 12, 2020

## 2020-03-12 NOTE — PROGRESS NOTES
Received bedside shift report from Maria Del Carmen Lopez RN. Pt lying in bed. No apparent distress. Respirations even and unlabored. Instructed to call for assistance with needs, as they arise. Pt voiced understanding.

## 2020-03-12 NOTE — PROGRESS NOTES
Renal Progress Note Admission Date: 2/13/2020 Subjective:  
Patient seen and examined on HD, dialyzing via left  Qb, UF 4600, SOB better with UF-on 5L O2 more comfortable and less anxious tolerating UF. Labs and chart reviewed ROS: 
General: no fever/ chills, appetite ok Lung: + SOB 
CV: no CP 
GI: no N/V/D Ext: right wrist edema Objective:  
 
Physical Exam:   
Patient Vitals for the past 8 hrs: 
 BP Temp Pulse Resp SpO2 Weight 03/12/20 1358 123/72  77     
03/12/20 1333 134/73  75     
03/12/20 1120     94 %   
03/12/20 1108 139/67 98.1 °F (36.7 °C) 85 22 94 %   
03/12/20 0743 129/66 97.6 °F (36.4 °C) 75 22 97 %   
03/12/20 0638      116.1 kg (255 lb 14.4 oz) Gen: alert and oriented, NAD HEENT: MMM 
CV: regular rate, S1, S2, no Rub Lungs: Clear bilaterally with low lung volumes, no wheezes Abd: soft, non tender, + BS Extem: no LE edema, right wrist trace edema, left foot necrotic toes Access: Left TCC- intact Current Facility-Administered Medications Medication Dose Route Frequency  Vancomycin Intermittent Dosing   Other PRN  
 warfarin (COUMADIN) tablet 6 mg  6 mg Oral QPM  
 insulin lispro (HUMALOG) injection   SubCUTAneous AC&HS  
 dextrose 40% (GLUTOSE) oral gel 1 Tube  15 g Oral PRN  
 glucagon (GLUCAGEN) injection 1 mg  1 mg IntraMUSCular PRN  
 dextrose (D50W) injection syrg 12.5-25 g  25-50 mL IntraVENous PRN  
 HYDROcodone-homatropine (HYCODAN) 5-1.5 mg/5 mL (5 mL) syrup 5 mL  5 mL Oral Q4H PRN  
 benzonatate (TESSALON) capsule 100 mg  100 mg Oral TID PRN  
 guaiFENesin ER (MUCINEX) tablet 1,200 mg  1,200 mg Oral Q12H  
 albuterol (PROVENTIL VENTOLIN) nebulizer solution 2.5 mg  2.5 mg Nebulization Q6H RT  
 insulin glargine (LANTUS) injection 5 Units  5 Units SubCUTAneous QHS  mirtazapine (REMERON) tablet 7.5 mg  7.5 mg Oral QHS  sertraline (ZOLOFT) tablet 25 mg  25 mg Oral DAILY  ALPRAZolam (XANAX) tablet 0.25 mg  0.25 mg Oral Q6H PRN  
 sodium chloride (OCEAN) 0.65 % nasal squeeze bottle 2 Spray  2 Spray Both Nostrils Q2H PRN  
 morphine injection 2 mg  2 mg IntraVENous Q6H PRN  
 cefepime (MAXIPIME) 2 g in 0.9% sodium chloride (MBP/ADV) 100 mL  2 g IntraVENous Q12H  
 sodium chloride 3% hypertonic nebulizer soln  4 mL Nebulization BID RT  
 sodium chloride (OCEAN) 0.65 % nasal squeeze bottle 2 Spray  2 Spray Both Nostrils BID  epoetin maritza-epbx (RETACRIT) 14,000 Units combo injection  14,000 Units SubCUTAneous Q7D  
 polyethylene glycol (MIRALAX) packet 17 g  17 g Oral DAILY  midodrine (PROAMITINE) tablet 10 mg  10 mg Oral TID WITH MEALS  tamsulosin (FLOMAX) capsule 0.4 mg  0.4 mg Oral QHS  loperamide (IMODIUM) capsule 4 mg  4 mg Oral QID PRN  pantothenic ac-min oil-pet,hyd (AQUAPHOR) 41 % ointment   Topical DAILY  traMADol (ULTRAM) tablet 50 mg  50 mg Oral Q6H PRN  
 sodium chloride (NS) flush 5-40 mL  5-40 mL IntraVENous PRN  
 acetaminophen (TYLENOL) tablet 650 mg  650 mg Oral Q4H PRN  
 naloxone (NARCAN) injection 0.4 mg  0.4 mg IntraVENous PRN  
 ondansetron (ZOFRAN) injection 4 mg  4 mg IntraVENous Q4H PRN  
 bisacodyL (DULCOLAX) tablet 5 mg  5 mg Oral DAILY PRN Data Review:  
 
LABS:  
Recent Results (from the past 12 hour(s)) Zeb Boyleato Collection Time: 03/12/20  3:51 AM  
Result Value Ref Range Vancomycin, random 31.1 UG/ML  
GLUCOSE, POC Collection Time: 03/12/20  5:58 AM  
Result Value Ref Range Glucose (POC) 146 (H) 65 - 100 mg/dL PROTHROMBIN TIME + INR Collection Time: 03/12/20  9:55 AM  
Result Value Ref Range Prothrombin time 42.7 (H) 12.0 - 14.7 sec INR 4.3 (HH) METABOLIC PANEL, BASIC Collection Time: 03/12/20  9:55 AM  
Result Value Ref Range Sodium 142 136 - 145 mmol/L Potassium 4.3 3.5 - 5.1 mmol/L  Chloride 108 (H) 98 - 107 mmol/L  
 CO2 30 21 - 32 mmol/L  
 Anion gap 4 (L) 7 - 16 mmol/L Glucose 183 (H) 65 - 100 mg/dL BUN 23 8 - 23 MG/DL Creatinine 2.19 (H) 0.8 - 1.5 MG/DL  
 GFR est AA 38 (L) >60 ml/min/1.73m2 GFR est non-AA 32 (L) >60 ml/min/1.73m2 Calcium 8.2 (L) 8.3 - 10.4 MG/DL  
CBC W/O DIFF Collection Time: 03/12/20  9:55 AM  
Result Value Ref Range WBC 7.7 4.3 - 11.1 K/uL  
 RBC 2.81 (L) 4.23 - 5.6 M/uL HGB 8.2 (L) 13.6 - 17.2 g/dL HCT 26.9 (L) 41.1 - 50.3 % MCV 95.7 79.6 - 97.8 FL  
 MCH 29.2 26.1 - 32.9 PG  
 MCHC 30.5 (L) 31.4 - 35.0 g/dL  
 RDW 15.1 (H) 11.9 - 14.6 % PLATELET 021 736 - 373 K/uL MPV 10.1 9.4 - 12.3 FL ABSOLUTE NRBC 0.00 0.0 - 0.2 K/uL GLUCOSE, POC Collection Time: 03/12/20 11:17 AM  
Result Value Ref Range Glucose (POC) 220 (H) 65 - 100 mg/dL Plan: Active Problems: 
  DM type 2 (diabetes mellitus, type 2) ([facility] (1/14/2013) HLD (hyperlipidemia) (1/14/2013) Obesity, morbid ([facility] (10/19/2018) Type 2 diabetes with nephropathy ([facility] (8/26/2019) CAD (coronary artery disease) (1/10/2020) S/P CABG x 3 (1/10/2020) S/P AVR (1/10/2020) Atrial fibrillation ([facility] (1/13/2020) HIT (heparin-induced thrombocytopenia) ([facility] (1/23/2020) Pneumonia due to infectious organism (2/14/2020) Hypotension (2/18/2020) Acute renal failure on dialysis ([facility] (2/25/2020) DNR (do not resuscitate) (2/26/2020) Acute on chronic respiratory failure with hypoxia ([facility] (3/8/2020) DEJUAN/CKD 
- ([place] [facility] schedule) 
-no sign of renal recovery, creatinine continues to rise in between HD  
-seen on HD, tolerating UF, catheter functioning well  
  
HIT/SHARON + 
  
ASHD s/p CABG 
  
Orthostatic Hypotension - on midodrine 
  
 Anemia - s/p IV Fe replacement and increase [place]ton Acute respiratory failure,  UF as tolerated 
- albuterol and CPAP @[facility] pulmonary following 
-on cefepime and vancomycin for HCAP per ID, vanc trough 31.1 today

## 2020-03-12 NOTE — PROGRESS NOTES
Pt returns to room from HD. Pt complaints of feeling hoarse. No distress noted at this time. Dressing to left perm cath clean, dry, intact at this time. Call light in reach, door open will monitor.

## 2020-03-12 NOTE — PROGRESS NOTES
Pt c/o increased SOB and increased anxiety. Requesting to be placed back on BiPap. Notified RRT to place pt back on BiPap. VSS. SpO2 95% on 5L [place]. Informed him I would be right back with his Xanax. Pt anna \"Help\" repeatedly the entire time this RN was at the [facility] removing his Xanax. Pt verbally aggressive and verbally abusive, requesting to see Dr. Cristina Celis. Reassured patient and instructed him to calm down and swallow his Xanax tablet.

## 2020-03-12 NOTE — PROGRESS NOTES
Critical Care Daily Progress Note: 3/12/2020 Ab Correa Admission Date: 2/13/2020 The patient's chart is reviewed and the patient is discussed with the staff. 68 yo Juanita Gutierrez a history of CAD s/p CABG with AVR 1/10/20 by Dr. Amina Tomas complicated by CKD requiring HD, HIT +, DVTs, wound left thigh and pneumonia.  Was discharged on Levaquin Q 48 hours last dose 2/11/20. Dione Romero was discharged on NC 2 L but has been increased to NC 4L O2 at STR. He presented to the ER on 2/15 with increased SOB. CTA chest was negative for PE but persistent LLL opacity. He had a bronch 2/17 with negative cultures and was treated for PNA using levaquin. He is now ESRD and being followed by nephrology. We were reconsulted on 3/6 for acute respiratory failure requiring bipap. CXR shows increased consolidation in the left base. He is febrile with a temp of 100.5. He has refused a swallow evaluation. ID is following and he was started on Vanc and Cefepime on 3/6. Subjective: Tolerated 6lpm yesterday with episodic dyspnea. No fevers. Fluid balance 32ml neg yesterday with 550ml of measured UOP. No hypotension Current Facility-Administered Medications Medication Dose Route Frequency  Vancomycin Intermittent Dosing   Other PRN  
 warfarin (COUMADIN) tablet 6 mg  6 mg Oral QPM  
 insulin lispro (HUMALOG) injection   SubCUTAneous AC&HS  
 dextrose 40% (GLUTOSE) oral gel 1 Tube  15 g Oral PRN  
 glucagon (GLUCAGEN) injection 1 mg  1 mg IntraMUSCular PRN  
 dextrose (D50W) injection syrg 12.5-25 g  25-50 mL IntraVENous PRN  
 HYDROcodone-homatropine (HYCODAN) 5-1.5 mg/5 mL (5 mL) syrup 5 mL  5 mL Oral Q4H PRN  
 benzonatate (TESSALON) capsule 100 mg  100 mg Oral TID PRN  
 guaiFENesin ER (MUCINEX) tablet 1,200 mg  1,200 mg Oral Q12H  
 albuterol (PROVENTIL VENTOLIN) nebulizer solution 2.5 mg  2.5 mg Nebulization Q6H RT  
  insulin glargine (LANTUS) injection 5 Units  5 Units SubCUTAneous QHS  mirtazapine (REMERON) tablet 7.5 mg  7.5 mg Oral QHS  sertraline (ZOLOFT) tablet 25 mg  25 mg Oral DAILY  ALPRAZolam (XANAX) tablet 0.25 mg  0.25 mg Oral Q6H PRN  
 sodium chloride (OCEAN) 0.65 % nasal squeeze bottle 2 Spray  2 Spray Both Nostrils Q2H PRN  
 morphine injection 2 mg  2 mg IntraVENous Q6H PRN  
 cefepime (MAXIPIME) 2 g in 0.9% sodium chloride (MBP/ADV) 100 mL  2 g IntraVENous Q12H  
 sodium chloride 3% hypertonic nebulizer soln  4 mL Nebulization BID RT  
 sodium chloride (OCEAN) 0.65 % nasal squeeze bottle 2 Spray  2 Spray Both Nostrils BID  epoetin maritza-epbx (RETACRIT) 14,000 Units combo injection  14,000 Units SubCUTAneous Q7D  
 polyethylene glycol (MIRALAX) packet 17 g  17 g Oral DAILY  midodrine (PROAMITINE) tablet 10 mg  10 mg Oral TID WITH MEALS  tamsulosin (FLOMAX) capsule 0.4 mg  0.4 mg Oral QHS  loperamide (IMODIUM) capsule 4 mg  4 mg Oral QID PRN  pantothenic ac-min oil-pet,hyd (AQUAPHOR) 41 % ointment   Topical DAILY  traMADol (ULTRAM) tablet 50 mg  50 mg Oral Q6H PRN  
 sodium chloride (NS) flush 5-40 mL  5-40 mL IntraVENous PRN  
 acetaminophen (TYLENOL) tablet 650 mg  650 mg Oral Q4H PRN  
 naloxone (NARCAN) injection 0.4 mg  0.4 mg IntraVENous PRN  
 ondansetron (ZOFRAN) injection 4 mg  4 mg IntraVENous Q4H PRN  
 bisacodyL (DULCOLAX) tablet 5 mg  5 mg Oral DAILY PRN Review of Systems 
+cough-yellow/gray sputum +LUE pain/edema 
+dyspnea Constitutional:  negative for fever, chills, sweats Cardiovascular:  negative for chest pain, palpitations, syncope, edema Gastrointestinal:  negative for dysphagia, reflux, vomiting, diarrhea, abdominal pain, or melena Neurologic:  negative for focal weakness, numbness, headache Objective:  
 
Vitals:  
 03/12/20 9218 03/12/20 0407 03/12/20 2530 03/12/20 3550 BP: 120/68 116/63  129/66 Pulse: 64 75  75 Resp: 17 18 22 Temp: 98.1 °F (36.7 °C) 97.4 °F (36.3 °C)  97.6 °F (36.4 °C) SpO2: 95% 100%  97% Weight:   255 lb 14.4 oz (116.1 kg) Height:      
 
 
 
Intake/Output Summary (Last 24 hours) at 3/12/2020 1103 Last data filed at 3/12/2020 8520 Gross per 24 hour Intake 598 ml Output 500 ml Net 98 ml Physical Exam:         
Constitutional:  the patient is well developed and in no acute distress, on  
Respiratory: insp wheezing anteriorly Cardiovascular:  RRR without M,G,R 
Gastrointestinal: soft and non-tender; with positive bowel sounds. Musculoskeletal: warm without cyanosis. Skin:  no jaundice or rashes Neurologic: no gross neuro deficits Psychiatric:  alert and oriented x 3 LINES: 
Peripheral IV L arm, HD access DRIPS: 
none CXR:  
 
 
LAB Recent Labs  
  03/12/20 
0558 03/11/20 
2050 03/11/20 
1633 03/11/20 
1145 03/11/20 
1430 GLUCPOC 146* 243* 250* 195* 118* Recent Labs  
  03/12/20 
0955 03/11/20 
0702 03/10/20 
0730 WBC 7.7 7.7 7.2 HGB 8.2* 8.3* 7.6* HCT 26.9* 27.2* 25.2*  
 166 162 INR  --  3.3 2.7 Recent Labs  
  03/12/20 
0955 03/11/20 
0702 03/10/20 
0730  140 138  
K 4.3 4.3 4.3 * 107 104 CO2 30 26 31 * 121* 140* BUN 23 19 18 CREA 2.19* 1.72* 1.79* CA 8.2* 7.9* 7.8* No results for input(s): PH, PCO2, PO2, HCO3, PHI, PCO2I, PO2I, HCO3I in the last 72 hours. No results for input(s): LCAD, LAC in the last 72 hours. No results for input(s): PH, PCO2, PO2, HCO3, PHI, PCO2I, PO2I, HCO3I in the last 72 hours. Patient Active Problem List  
Diagnosis Code  DM type 2 (diabetes mellitus, type 2) ([facility] E11.9  
 Gout M10.9  BPH (benign prostatic hyperplasia) N40.0  Seasonal allergic rhinitis J30.2  
 HLD (hyperlipidemia) E78.5  Nonrheumatic aortic valve stenosis I35.0  Obesity, morbid ([facility] E66.01  
 Type 2 diabetes with nephropathy (HCC) E11.21  
 Bilateral carotid artery stenosis I65.23  
  Aortic valve stenosis I35.0  
 CAD (coronary artery disease) I25.10  
 S/P CABG x 3 Z95.1  
 S/P AVR Z95.2  Atrial fibrillation (HCC) I48.91  
 HIT (heparin-induced thrombocytopenia) ([facility]) D75.82  
 Pneumonia due to infectious organism J18.9  Hypotension I95.9  Acute renal failure on dialysis (HCC) N17.9, Z99.2  DNR (do not resuscitate) [phone]  Acute on chronic respiratory failure with hypoxia ([facility]) J96.21 Assessment:  (Medical Decision Making) Hospital Problems  Date Reviewed: 3/9/2020 Codes Class Noted POA Acute on chronic respiratory failure with hypoxia [facility]) ICD-10-CM: J96.21 
ICD-9-CM: 518.84, 799.02  3/8/2020 Unknown Oxygen being weaned DNR (do not resuscitate) (Chronic) ICD-10-CM: H07 ICD-9-CM: V49.86  2/26/2020 Yes Pt's request   
 Acute renal failure on dialysis [facility]) ICD-10-CM: N17.9, Z99.2 ICD-9-CM: 584.9, V45.11  2/25/2020 Yes ESRD-on HD schedule, per nephrology Hypotension (Chronic) ICD-10-CM: I95.9 ICD-9-CM: 458.9  2/18/2020 Yes Resolving Pneumonia due to infectious organism ICD-10-CM: J18.9 ICD-9-CM: 136.9, 484.8  2/14/2020 Unknown On cefepime and Vanc HIT (heparin-induced thrombocytopenia) ([facility]) (Chronic) ICD-10-CM: K12.71 ICD-9-CM: 289.84  1/23/2020 Yes On coumadin, INR 2.5 Atrial fibrillation (HCC) (Chronic) ICD-10-CM: I48.91 
ICD-9-CM: 427.31  1/13/2020 Yes Paced now. CAD (coronary artery disease) (Chronic) ICD-10-CM: I25.10 ICD-9-CM: 414.00  1/10/2020 Yes S/p CABG   
 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 
ICD-9-CM: 250.40, 583.81  8/26/2019 Yes SSI Obesity, morbid ([address] (Chronic) ICD-10-CM: E66.01 
ICD-9-CM: 278.01  10/19/2018 Yes  
 BMI > 40 HLD (hyperlipidemia) (Chronic) ICD-10-CM: L88.1 ICD-9-CM: 272.4  1/14/2013 Yes Chronic Plan:  (Medical Decision Making) --f/u CXR today. --cPAP nightly 
--encourage OOB to chair today, monitoring BP (seems improved now, even despite fluid removal yesterday). IS should be encouraged. --continue albuterol on a scheduled basis, which he says alleviates dyspnea 
--ID following, on Cefepime and Vanc 3/6-13 
--remains on warfarin for LILIBETH More than 50% of the time documented was spent in face-to-face contact with the patient and in the care of the patient on the floor/unit where the patient is located.  
 
Fina Kramer MD

## 2020-03-12 NOTE — PROGRESS NOTES
03/11/20 2100 Oxygen Therapy O2 Sat (%) 96 % Pulse via Oximetry 75 beats per minute O2 Device CPAP mask PEEP/CPAP (cm H2O) 14 cm H20 FIO2 (%) 35 % Respiratory Respiratory (WDL) X Respiratory Pattern Tachypneic Upper Airway Sounds Coarse Chest/Tracheal Assessment Chest expansion, symmetrical  
Breath Sounds Bilateral Clear;Diminished CPAP/BIPAP  
CPAP/BIPAP Start/Stop On Device Mode CPAP  
$$ CPAP Daily Yes [employer] ID # [id number] Mask Type and Size Full face; Other (comment) (XL) Skin Condition intact PIP Observed 16 cm H20  
EPAP (cm H2O) 14 cm H2O Vt Spont (ml) 540 ml Ve Observed (l/min) 17 l/min Total RR (Spontaneous) 30 breaths per minute Insp Rise Time (sec) 2 Leak (Estimated) 13 L/min  
Pt's Home Machine No  
Biomedical Check Performed Yes Settings Verified Yes Alarm Settings High Pressure 30 Low Pressure 2 Apnea 20 Low Ve 3 High Rate 50 Low Rate 10 Pulmonary Toilet Pulmonary Toilet H. O.B elevated

## 2020-03-12 NOTE — PROGRESS NOTES
Pt resting in bed with eyes closed. Pt on 5 L HF NC at this time. Call light in reach, door open will monitor.

## 2020-03-12 NOTE — DIALYSIS
TRANSFER OUT- DIALYSIS Hemodialysis treatment completed without complications. Patient caox4 and VS stable  /65  P 81   
 
 3.8 Kgs removed. Flushed both ports with 10 mL of NS.  CVC dressing clean, dry, and intact, tego caps intact, bilateral lumens wrapped with 4x4 gauze. Patient to 23-14-20-09 after dialysis.

## 2020-03-12 NOTE — PROGRESS NOTES
RT called for patient who states he is feeling SOB, and would like to be put on CPAP. Patient placed on CPAP per his request.  No distress noted.

## 2020-03-12 NOTE — DIALYSIS
TRANSFER IN - DIALYSIS Received patient in dialysis unit  from [facility] (unit) for ordered procedure. Consent verified for renal replacement therapy. Patient alert and vital signs stable. /73 P75 Hemodialysis initiated using Left CVC. Aspirated and flushed both ports without difficulty. Dressing clean, dry and intact. Machine settings per MD order. Heparin 0 unit bolus and 0 units/hr. Will monitor during treatment.

## 2020-03-12 NOTE — INTERDISCIPLINARY ROUNDS
Interdisciplinary team rounds were held 3/12/2020 with the following team members:Care Management, Physical Therapy, Physician and Clinical Coordinator and the patient. Plan of care discussed. See clinical pathway and/or care plan for interventions and desired outcomes.

## 2020-03-12 NOTE — PROGRESS NOTES
Warfarin dosing per pharmacist 
 
Rita Ambrosio is a 69 y.o. male. Height: 5' 10\" (177.8 cm)   Weight 103.5 kg (228 lbs) Indication:  AVR and afib Goal INR:  2.5 - 3.5 Home dose:  2.5 mg daily Risk factors or significant drug interactions: HIT+(SHARON positive on 1/18/20), amiodarone (dc'd 2/5), Levofloxacin (2/13- 2/20 ), mirtazapine (2/13-2/24), escitalopram (2/19-2/20), trazodone (started 2/24) Other anticoagulants:  N/A Daily Monitoring Date  INR     Warfarin dose HGB              Notes 2/14  2.4  3 mg  8.7 2/15  2.2  4 mg  9.6 2/16  2.1  5 mg  9.8 2/17  2.1  5 mg  9.5 2/18  2.8  2 mg  9.3 2/19  2.5  2 mg  8.7 
2/20  1.9  3 mg   8.8 
2/21  1.9  4 mg  8.6 
2/22  2.1  3 mg  8.5 
2/23  1.9  4 mg  --- 
2/24  1.7  3 mg + 2 mg --- 
2/25  1.7  5 mg  --- 
2/26  1.6  6 mg  --- 
2/27  1.7  6 mg   --- 
2/28  1.7  7.5 mg  --- 
2/29  2.1 ` 6 mg  7.7 
3/1  2.0  7.5 mg  --- 
3/2  2.9  6 mg  --- 
3/3  3.4  5 mg  8.0 
3/4  2.7  7.5mg  8.6 
3/5  2.9  6 mg  8.0 
3/6  3.1  Held  8.4 
3/7  2.6  6 mg  8.0 
3/8  2.7  6mg  --- 
3/9  2.5  6 mg  --- 
3/10  2.7  6 mg  7.6 
3/11  3.3  5 mg  8.3 
3/12  4.3  Held  8.2 INR is supratherapeutic today, will hold todays dose. INR tomorrow to determine future dosing. If pulmonary needs to do a thoracentesis, warfarin would have to be held and patient would need to be bridged with argatroban (HIT +). Pharmacy will continue to follow patient and monitor INR daily. Thank you, Edda Michelle, PharmD, [facility] Clinical Pharmacy Specialist 
[phone]

## 2020-03-13 NOTE — PROGRESS NOTES
03/12/20 2013 Oxygen Therapy O2 Sat (%) 95 % Pulse via Oximetry 82 beats per minute O2 Device CPAP mask PEEP/CPAP (cm H2O) 14 cm H20 FIO2 (%) 35 % Respiratory Respiratory (WDL) X Respiratory Pattern Tachypneic Chest/Tracheal Assessment Chest expansion, symmetrical  
Breath Sounds Bilateral Diminished Cough Non-productive CPAP/BIPAP  
CPAP/BIPAP Start/Stop On Device Mode CPAP  
$$ CPAP Daily Yes Mask Type and Size Full face; Other (comment) (XL) Skin Condition intact;   
PIP Observed 14 cm H20  
EPAP (cm H2O) 12 cm H2O 
(On 14, patient stated \"too much pressure\"-decreased 12) Vt Spont (ml) 418 ml Ve Observed (l/min) 15.8 l/min Backup Rate 12 Total RR (Spontaneous) 36 breaths per minute Insp Rise Time (sec) 2 Leak (Estimated) 29 L/min  
Pt's Home Machine No 
(V60) Biomedical Check Performed Yes Settings Verified Yes Alarm Settings High Pressure 30 Low Pressure 2 Apnea 20 Low Ve 3 High Rate 50 Low Rate 10 Pulmonary Toilet Pulmonary Toilet H. O.B elevated Patient placed on CPAP (V60). SAT is 95%. Patient resting comfortably. Nurse was made aware.

## 2020-03-13 NOTE — PROGRESS NOTES
Xanax 0.25mg given PO for anxiety. Patient refused Zofran. Patient actively putting hands down his throat to make hisself vomit. States \"a potato is hung in my throat\" Patient had minimal cream of potato for lunch. Patient very disgruntled and rude to staff. Attempted to educate patient on not putting fingers down throat. Will continue to monitor.

## 2020-03-13 NOTE — PROGRESS NOTES
Warfarin dosing per pharmacist 
 
Renea Menard is a 69 y.o. male. Height: 5' 10\" (177.8 cm)   Weight 103.5 kg (228 lbs) Indication:  AVR and afib Goal INR:  2.5 - 3.5 Home dose:  2.5 mg daily Risk factors or significant drug interactions: HIT+(SHARON positive on 1/18/20), amiodarone (dc'd 2/5), Levofloxacin (2/13- 2/20 ), mirtazapine (2/13-2/24), escitalopram (2/19-2/20), trazodone (started 2/24) Other anticoagulants:  N/A Daily Monitoring Date  INR     Warfarin dose HGB              Notes 2/14  2.4  3 mg  8.7 2/15  2.2  4 mg  9.6 2/16  2.1  5 mg  9.8 2/17  2.1  5 mg  9.5 2/18  2.8  2 mg  9.3 2/19  2.5  2 mg  8.7 
2/20  1.9  3 mg   8.8 
2/21  1.9  4 mg  8.6 
2/22  2.1  3 mg  8.5 
2/23  1.9  4 mg  --- 
2/24  1.7  3 mg + 2 mg --- 
2/25  1.7  5 mg  --- 
2/26  1.6  6 mg  --- 
2/27  1.7  6 mg   --- 
2/28  1.7  7.5 mg  --- 
2/29  2.1 ` 6 mg  7.7 
3/1  2.0  7.5 mg  --- 
3/2  2.9  6 mg  --- 
3/3  3.4  5 mg  8.0 
3/4  2.7  7.5mg  8.6 
3/5  2.9  6 mg  8.0 
3/6  3.1  Held  8.4 
3/7  2.6  6 mg  8.0 
3/8  2.7  6mg  --- 
3/9  2.5  6 mg  --- 
3/10  2.7  6 mg  7.6 
3/11  3.3  5 mg  8.3 
3/12  4.3  Held  8.2 
3/13  3.9  Held  8.8 INR is supratherapeutic today, however trending down. Will give lower dose of 4 mg today to attempt to prevent the patient becoming subtherapeutic. If pulmonary needs to do a thoracentesis, warfarin would have to be held and patient would need to be bridged with argatroban (HIT +). Pharmacy will continue to follow patient and monitor INR daily. Thank you, Natalee Anna, PharmD, [facility] Clinical Pharmacy Specialist 
[phone] Addendum: 
 
Spoke with Dr. Thomas Abad regarding warfarin therapy. Pulmonary plans to do a thoracentesis, so will need to hold warfarin and let INR trend down for that.  
 
Anticipate needing to start argatroban drip once INR drops below 2 due to history of HIT and coordinating with pulmonary regarding timing of thoracentesis and holding argatroban drip ahead of this procedure. Will then eventually bridge patient back to warfarin with argatroban. Patient may possibly transfer to 4th floor [facility] in the near future as well once insurance approved, so [facility] pharmacy staff would take over at that point in time. Will hold warfarin for now. Continue daily INR for now so we will know when to start argatroban drip. Thank you, 
Frankie Zapata, PharmD, [facility] Clinical Pharmacist 
[phone]

## 2020-03-13 NOTE — PROGRESS NOTES
Monitor tech called to ask if this patient has a pacemaker, because she said that it looked as if a pacemaker had kicked in for a bit. No mention of PM seen in H&P but informed her that according to the 3/10/20 CXR \". .. Again noted is cardiomegaly, a prior sternotomy, a prosthetic heart valve and a right chest wall pacemaker. Trevin Reyes \"  Monitor tech sent up a copy of the strip which I placed in the paper chart.

## 2020-03-13 NOTE — PROGRESS NOTES
Problem: Mobility Impaired (Adult and Pediatric) Goal: *Acute Goals and Plan of Care (Insert Text) Description LTG: (Reviewed and updated 3/13/20) (1.)Mr. Jes Mcelroy will move from supine to sit and sit to supine , scoot up and down and roll side to side INDEPENDENTLY within 7 treatment day(s) from flat surface. (2.)Mr. Jes Mcelroy will transfer from bed to chair and chair to bed with STAND BY ASSIST using the least restrictive device within 7 treatment day(s). (3.)Mr. Jes Mcelroy will ambulate with CONTACT GUARD ASSIST for 75+ feet with the least restrictive device within 7 treatment day(s) while maintaining normal vital signs. (4.)Mr. Jes Mcelroy will perform exercises per HEP for 10+ minutes to improve strength and mobility within 7 days. ____________________________________________________________________________________________ Outcome: Progressing Towards Goal 
  
PHYSICAL THERAPY: Daily Note, Re-evaluation and AM 3/13/2020 INPATIENT: PT Visit Days : 1 Payor: Ramiro Velasquez / Plan: Vaishnavi Vargas / Product Type: Managed Care Medicare /   
  
NAME/AGE/GENDER: Josh Sanchez is a 69 y.o. male PRIMARY DIAGNOSIS: [facility] [J96.00] Fluid overload [E87.70] Acute hypoxemic respiratory failure (HCC) Acute hypoxemic respiratory failure (HCC) Procedure(s) (LRB): ULTRASOUND (Bilateral) 3 Days Post-Op ICD-10: Treatment Diagnosis:  
 · Generalized Muscle Weakness (M62.81) · Difficulty in walking, Not elsewhere classified (R26.2) Precaution/Allergies: 
Heparin; Amoxicillin; Keflex [cephalexin]; and Pcn [penicillins] ASSESSMENT:  
 
Mr. Jes Mcelroy is seen for therapy reevaluation today; he was previously discharged due to transfer to ICU and is now back on 8th floor. He presents in supine without specific complaints, agreeable to reassessment and mobility.  Vitals monitored during position change and transfers, BP and O2 sats remain stable throughout session despite pt feeling dizzy and c/o room spinning during sitting and standing. Min-mod assist for bed mobility and sit-stand transfer. Initially worked on seated static/dynamic mobility and exercises, then practiced transfers to chair, then gait training x 2 attempts for a few steps each, terminated each time due to pt feeling symptomatic although BP stable. Positioned comfortably in recliner after activity with needs in reach. Goals reviewed and modified. Will continue with acute PT per plan of care to address mobility, transfers, and gait training to improve strength and activity tolerance. Plans for dc to [facility]. 114/63 supine 93/54 sitting (pre activity) 98/53 sitting (post chair transfer) 98/50 standing This section established at most recent assessment PROBLEM LIST (Impairments causing functional limitations): 1. Decreased Strength 2. Decreased ADL/Functional Activities 3. Decreased Transfer Abilities 4. Decreased Ambulation Ability/Technique 5. Decreased Balance 6. Decreased Activity Tolerance 7. Increased Fatigue 8. Increased Shortness of Breath 9. Edema/Girth INTERVENTIONS PLANNED: (Benefits and precautions of physical therapy have been discussed with the patient.) 1. Balance Exercise 2. Bed Mobility 3. Family Education 4. Gait Training 5. Home Exercise Program (HEP) 6. Neuromuscular Re-education/Strengthening 7. Therapeutic Activites 8. Therapeutic Exercise/Strengthening 9. Transfer Training TREATMENT PLAN: Frequency/Duration: 3 times a week for duration of hospital stay Rehabilitation Potential For Stated Goals: Good REHAB RECOMMENDATIONS (at time of discharge pending progress):   
Placement:  
It is my opinion, based on this patient's performance to date, that Mr. Zaheer Salas may benefit from intensive therapy at a [address] [place] [address] after discharge due to the functional deficits listed above that are likely to improve with skilled rehabilitation and concerns that he/she may be unsafe to be unsupervised at home due to a fall risk, safety concerns for transfers and ambulation at home. Equipment: ? To be determined HISTORY:  
History of Present Injury/Illness (Reason for Referral): Mr. 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 Was DC 2/12/20 from CABG and aortic valve replacement complicated by CKD requiring HD, HIT +, DVTs, wound left thigh on wound vac, pneumonia DC on levaquin Q 48 hours last dose 2/11/20. He was DC on 2 L O2 however has been increased to 4L O2 via NC at STR yesterday when he arrived. Today he was at HD and reports dizziness with sitting up and acute onset of SOB. Son at bedside and states pt was doing ok since DC yesterday until today at HD. INR 2.4. CXR showed improving interstitial edema, unchanged left basilar opacity and left pleural effusion. Pt given lasix in ED. Pt is SOB when talking in short sentences. Denies CP, n/v.  Has had diarrhea however is not new since hospitalization. Past Medical History/Comorbidities:  
Mr. Lisbeth Ritchie  has a past medical history of Arthritis, BPH (benign prostatic hyperplasia) (1/14/2013), CAD (coronary artery disease), DM type 2 (diabetes mellitus, type 2) ([facility] (dx 2004), Dyspnea, Gout (1/14/2013), HLD (hyperlipidemia) (1/14/2013), HTN (hypertension), Morbid obesity ([facility] (9/3/14), Psychiatric disorder, Rheumatic fever, Seasonal allergic rhinitis, Severe aortic valve stenosis, Thyroid disease, and Unspecified sleep apnea (2016).   Mr. Lisbeth Ritchie  has a past surgical history that includes hx tonsil and adenoidectomy; hx heart catheterization (12/23/2019); hx orthopaedic (Left); hx cataract removal (Bilateral, 2012); and ir insert lourdesl cvc w port over 5 years (2/4/2020). Social History/Living Environment:  
Home Environment: Private residence # Steps to Enter: 2 Rails to Enter: No 
One/Two Story Residence: One story Living Alone: No 
Support Systems: Spouse/Significant Other/Partner Patient Expects to be Discharged to[de-identified] Rehabilitation facility Current DME Used/Available at Home: Cane, straight, Walker, rolling Tub or Shower Type: Shower Prior Level of Function/Work/Activity: 
Lives with spouse, admit from rehab; has been using RW short distance ambulation, assist ADLs Personal Factors:   
      Sex:  male Age:  69 y.o. Overall Behavior:  agreeable Number of Personal Factors/Comorbidities that affect the Plan of Care: 
CAD, DM, DVTs, age 3+: HIGH COMPLEXITY EXAMINATION:  
Most Recent Physical Functioning:  
Gross Assessment: 
AROM: Within functional limits Strength: Generally decreased, functional 
Coordination: Within functional limits Posture: 
Posture (WDL): Exceptions to [facility] Posture Assessment: Forward head, Rounded shoulders, Trunk flexion Balance: 
Sitting: Impaired Sitting - Static: Good (unsupported) Sitting - Dynamic: Fair (occasional) Standing: Impaired Standing - Static: Fair Standing - Dynamic : Fair(-) Bed Mobility: 
Rolling: Contact guard assistance Supine to Sit: Minimum assistance; Moderate assistance Scooting: Contact guard assistance Wheelchair Mobility: 
  
Transfers: 
Sit to Stand: Contact guard assistance;Minimum assistance Stand to Sit: Contact guard assistance Bed to Chair: Contact guard assistance;Minimum assistance Interventions: Verbal cues; Visual cues; Safety awareness training; Tactile cues;Manual cues Duration: 30 Minutes Gait: 
  
Base of Support: Widened;Center of gravity altered Speed/Federica: Pace decreased (<100 feet/min); Slow Step Length: Left shortened;Right shortened Gait Abnormalities: Decreased step clearance;Trunk sway increased; Path deviations Distance (ft): 5 Feet (ft) Assistive Device: Walker, rolling;Gait belt Ambulation - Level of Assistance: Contact guard assistance;Minimal assistance Interventions: Verbal cues; Safety awareness training; Tactile cues;Manual cues Body Structures Involved: 1. Heart 2. Lungs 3. Muscles Body Functions Affected: 1. Cardio 2. Respiratory 3. Movement Related Activities and Participation Affected: 1. General Tasks and Demands 2. Mobility 3. Self Care Number of elements that affect the Plan of Care: 4+: HIGH COMPLEXITY CLINICAL PRESENTATION:  
Presentation: Evolving clinical presentation with changing clinical characteristics: MODERATE COMPLEXITY CLINICAL DECISION MAKIN[address] [place] [id number] AM-PAC 6 Clicks Basic Mobility Inpatient Short Form How much difficulty does the patient currently have. .. Unable A Lot A Little None 1. Turning over in bed (including adjusting bedclothes, sheets and blankets)? [] 1   [] 2   [x] 3   [] 4  
2. Sitting down on and standing up from a chair with arms ( e.g., wheelchair, bedside commode, etc.)   [] 1   [] 2   [x] 3   [] 4  
3. Moving from lying on back to sitting on the side of the bed? [] 1   [x] 2   [] 3   [] 4 How much help from another person does the patient currently need. .. Total A Lot A Little None 4. Moving to and from a bed to a chair (including a wheelchair)? [] 1   [] 2   [x] 3   [] 4  
5. Need to walk in hospital room? [] 1   [] 2   [x] 3   [] 4  
6. Climbing 3-5 steps with a railing? [x] 1   [] 2   [] 3   [] 4  
© , Trustees of [address] [place] [id number], under license to [employer]. All rights reserved Score:  Initial: 17 Most Recent: 15 (Date: 3/13/20 ) Interpretation of Tool:  Represents activities that are increasingly more difficult (i.e. Bed mobility, Transfers, Gait). Medical Necessity: · Patient is expected to demonstrate progress in  
· strength, range of motion, balance, and coordination ·  to  
· decrease assistance required with overall functional mobility, transfers, ambulation · . · Patient demonstrates · good ·  rehab potential due to higher previous functional level. Reason for Services/Other Comments: 
· Patient · continues to require present interventions due to patient's inability to perform bed mobility, transfers, ambulation safely and effectively · . Use of outcome tool(s) and clinical judgement create a POC that gives a: Clear prediction of patient's progress: LOW COMPLEXITY  
  
 
 
 
TREATMENT:  
(In addition to Assessment/Re-Assessment sessions the following treatments were rendered) Pre-treatment Symptoms/Complaints:  \"thank you\" Pain: Initial:  
Pain Intensity 1: 0  Post Session: 0/10 PT Reassessment Table:  
Initial Physical Functioning: Most Recent Physical Functioning:  
Gross Assessment: 
AROM: Generally decreased, functional(B LE) Strength: Generally decreased, functional(B LE grossly 4/5) Coordination: Generally decreased, functional 
Sensation: Intact(B LE) Gross Assessment:  
AROM: Within functional limits Strength: Generally decreased, functional 
Coordination: Within functional limits Posture:  
Posture (WDL): Exceptions to [facility] [place] Posture Assessment: Forward head, Rounded shoulders Posture:  
Posture (WDL): Exceptions to [facility] [place] Posture Assessment: Forward head;Rounded shoulders;Trunk flexion Balance:  
Sitting: Intact Sitting - Static: Good (unsupported) Sitting - Dynamic: Fair (occasional) Standing: Impaired Standing - Static: Good, Fair Standing - Dynamic : Fair Balance:  
Sitting: Impaired Sitting - Static: Good (unsupported) Sitting - Dynamic: Fair (occasional) Standing: Impaired Standing - Static: Fair Standing - Dynamic : Fair(-) Bed Mobility:  
Rolling: Stand-by assistance Supine to Sit: Minimum assistance Sit to Supine: Minimum assistance Scooting: Minimum assistance Interventions: Manual cues, Verbal cues Bed Mobility:  
Rolling: Contact guard assistance Supine to Sit: Minimum assistance; Moderate assistance Scooting: Contact guard assistance Transfers:  
Sit to Stand: Minimum assistance Stand to Sit: Minimum assistance Bed to Chair: Minimum assistance Interventions: Safety awareness training, Verbal cues, Visual cues Duration: 13 Minutes Transfers:  
Sit to Stand: Contact guard assistance;Minimum assistance Stand to Sit: Contact guard assistance Bed to Chair: Contact guard assistance;Minimum assistance Interventions: Verbal cues; Visual cues; Safety awareness training; Tactile cues;Manual cues Duration: 30 Minutes Gait:  
Base of Support: Widened Speed/Federica: Slow Step Length: Left shortened, Right shortened Swing Pattern: Left symmetrical, Right symmetrical 
Gait Abnormalities: Decreased step clearance Ambulation - Level of Assistance: Contact guard assistance, Minimal assistance Distance (ft): 5 Feet (ft)(from bed to chair, chair to bed) Assistive Device: Walker, rolling Interventions: Safety awareness training, Verbal cues Gait:  
Base of Support: Widened;Center of gravity altered Speed/Federica: Pace decreased (<100 feet/min); Slow Step Length: Left shortened;Right shortened Gait Abnormalities: Decreased step clearance;Trunk sway increased; Path deviations Ambulation - Level of Assistance: Contact guard assistance;Minimal assistance Distance (ft): 5 Feet (ft) Assistive Device: Walker, rolling;Gait belt Interventions: Verbal cues; Safety awareness training; Tactile cues;Manual cues Therapeutic Activity: (  30 Minutes ):  Therapeutic activities including Bed mobility, seated static/dynamic activities, sit-stand transfer training, Chair transfers,and seated LE exercises throughout session to improve mobility, strength, balance, coordination and activity tolerance. Required minimal Verbal cues; Safety awareness training; Tactile cues;Manual cues to promote static and dynamic balance in standing and promote motor control of bilateral, lower extremity(s). Date: 
3/13/20 Date: 
 Date: Activity/Exercise Parameters Parameters Parameters LAQ 15x AB Seated marching 15x AB Ankle pumps 15x AB Seated hip aBd     
     
     
     
A=active, B=bilateral 
 
 
 
 
Braces/Orthotics/Lines/Etc:  
· Nasal cannula Treatment/Session Assessment:   
· Response to Treatment:  Goals updated, BP stable today during reassessment · Interdisciplinary Collaboration:  
o Physical Therapist 
o Registered Nurse · After treatment position/precautions:  
o Up in chair 
o Bed/Chair-wheels locked 
o Bed in low position 
o Call light within reach 
o RN notified · Compliance with Program/Exercises: Will assess as treatment progresses · Recommendations/Intent for next treatment session: \"Next visit will focus on advancements to more challenging activities\". Total Treatment Duration: PT Patient Time In/Time Out Time In: 0945 Time Out: 1015 Han Corenlius DPT

## 2020-03-13 NOTE — PROGRESS NOTES
Assumed care of patient. Assessment completed and documented, see docflow. Patient resting quietly in bed. NAD noted at present. Respirations even and non labored on CPAP. Safety measures in place. Call light within reach. Will continue to monitor.

## 2020-03-13 NOTE — DIALYSIS
TRANSFER IN - DIALYSIS Received patient in dialysis unit  from [facility] (unit) for ordered procedure. Consent verified for renal replacement therapy. Patient alert and vital signs stable. /63 P 92 Hemodialysis initiated using Left CVC. Aspirated and flushed both ports without difficulty. Dressing clean, dry and intact. Machine settings per MD order. Heparin 0 unit bolus and 0 units/hr. Will monitor during treatment.

## 2020-03-13 NOTE — DIALYSIS
TRANSFER OUT- DIALYSIS Hemodialysis treatment completed without complications. Patient alert and VS stable  BP 99/61  P 86   
 
 1.8 Kgs removed. Flushed both ports with 10 mL of NS.  CVC dressing clean, dry, and intact, tego caps intact, bilateral lumens wrapped with 4x4 gauze. Meds given-None. No units of RBCs given during dialysis. Patient to 23-14-20-09 after dialysis.

## 2020-03-13 NOTE — DIALYSIS
Pt. Has 3hours left of tx, and b/p has fallen systolic 79'G. Decreased goal 4100. B/p up 113/63. Pt. Sleeping and shows no signs of distress at this time

## 2020-03-13 NOTE — PROGRESS NOTES
Critical Care Daily Progress Note: 3/13/2020 Jim Gómez Admission Date: 2/13/2020 The patient's chart is reviewed and the patient is discussed with the staff. 68 yo Angelic Franklin a history of CAD s/p CABG with AVR 1/10/20 by Dr. Steffi Ortega complicated by CKD requiring HD, HIT +, DVTs, wound left thigh and pneumonia.  Was discharged on Levaquin Q 48 hours last dose 2/11/20. Yesenia Saldana was discharged on NC 2 L but has been increased to NC 4L O2 at [facility]. He presented to the ER on 2/15 with increased SOB. CTA chest was negative for PE but persistent LLL opacity. He had a bronch 2/17 with negative cultures and was treated for PNA using levaquin. He is now ESRD and being followed by nephrology. We were reconsulted on 3/6 for acute respiratory failure requiring bipap. CXR shows increased consolidation in the left base. He is febrile with a temp of 100.5. He has refused a swallow evaluation. ID is following and he was started on Vanc and Cefepime on 3/6. Subjective: Weaned down to 4lpm HFNC with O2 sat of 97%. Afebrile. Still has considerable vascular congestion and LLL infiltrate on CXR 
3.8kg removed yesterday. Says he is starting to feel improved. Current Facility-Administered Medications Medication Dose Route Frequency  warfarin (COUMADIN) tablet 4 mg  4 mg Oral ONCE  
 [START ON 3/14/2020] warfarin (COUMADIN) tablet 5 mg  5 mg Oral QPM  
 insulin lispro (HUMALOG) injection   SubCUTAneous AC&HS  
 dextrose 40% (GLUTOSE) oral gel 1 Tube  15 g Oral PRN  
 glucagon (GLUCAGEN) injection 1 mg  1 mg IntraMUSCular PRN  
 dextrose (D50W) injection syrg 12.5-25 g  25-50 mL IntraVENous PRN  
 HYDROcodone-homatropine (HYCODAN) 5-1.5 mg/5 mL (5 mL) syrup 5 mL  5 mL Oral Q4H PRN  
 benzonatate (TESSALON) capsule 100 mg  100 mg Oral TID PRN  
 guaiFENesin ER (MUCINEX) tablet 1,200 mg  1,200 mg Oral Q12H  
 albuterol (PROVENTIL VENTOLIN) nebulizer solution 2.5 mg  2.5 mg Nebulization Q6H RT  
 insulin glargine (LANTUS) injection 5 Units  5 Units SubCUTAneous QHS  mirtazapine (REMERON) tablet 7.5 mg  7.5 mg Oral QHS  sertraline (ZOLOFT) tablet 25 mg  25 mg Oral DAILY  ALPRAZolam (XANAX) tablet 0.25 mg  0.25 mg Oral Q6H PRN  
 sodium chloride (OCEAN) 0.65 % nasal squeeze bottle 2 Spray  2 Spray Both Nostrils Q2H PRN  
 morphine injection 2 mg  2 mg IntraVENous Q6H PRN  
 sodium chloride 3% hypertonic nebulizer soln  4 mL Nebulization BID RT  
 sodium chloride (OCEAN) 0.65 % nasal squeeze bottle 2 Spray  2 Spray Both Nostrils BID  epoetin maritza-epbx (RETACRIT) 14,000 Units combo injection  14,000 Units SubCUTAneous Q7D  
 polyethylene glycol (MIRALAX) packet 17 g  17 g Oral DAILY  midodrine (PROAMITINE) tablet 10 mg  10 mg Oral TID WITH MEALS  tamsulosin (FLOMAX) capsule 0.4 mg  0.4 mg Oral QHS  loperamide (IMODIUM) capsule 4 mg  4 mg Oral QID PRN  pantothenic ac-min oil-pet,hyd (AQUAPHOR) 41 % ointment   Topical DAILY  traMADol (ULTRAM) tablet 50 mg  50 mg Oral Q6H PRN  
 sodium chloride (NS) flush 5-40 mL  5-40 mL IntraVENous PRN  
 acetaminophen (TYLENOL) tablet 650 mg  650 mg Oral Q4H PRN  
 naloxone (NARCAN) injection 0.4 mg  0.4 mg IntraVENous PRN  
 ondansetron (ZOFRAN) injection 4 mg  4 mg IntraVENous Q4H PRN  
 bisacodyL (DULCOLAX) tablet 5 mg  5 mg Oral DAILY PRN Review of Systems 
+cough-yellow/gray sputum +LUE pain/edema 
+dyspnea Constitutional:  negative for fever, chills, sweats Cardiovascular:  negative for chest pain, palpitations, syncope, edema Gastrointestinal:  negative for dysphagia, reflux, vomiting, diarrhea, abdominal pain, or melena Neurologic:  negative for focal weakness, numbness, headache Objective:  
 
Vitals:  
 03/13/20 0656 03/13/20 0724 03/13/20 1015 03/13/20 1048 BP:  140/71 114/63 Pulse:  78 Resp:  28 Temp:  97.4 °F (36.3 °C) SpO2:  98%  97% Weight: 256 lb 12.8 oz (116.5 kg) Height:      
 
 
 
Intake/Output Summary (Last 24 hours) at 3/13/2020 1109 Last data filed at 3/13/2020 0830 Gross per 24 hour Intake 348 ml Output 4650 ml Net -4302 ml Physical Exam:         
Constitutional:  the patient is well developed and in no acute distress, on  
Respiratory: crackles, but overall lung sounds are improved. Cardiovascular:  RRR without M,G,R 
Gastrointestinal: soft and non-tender; with positive bowel sounds. Musculoskeletal: warm without cyanosis. Skin:  no jaundice or rashes Neurologic: no gross neuro deficits Psychiatric:  alert and oriented x 3 LINES: 
Peripheral IV L arm, HD access DRIPS: 
none CXR:  
 
 
LAB Recent Labs  
  03/13/20 
0553 03/12/20 
2041 03/12/20 
1707 03/12/20 
1117 03/12/20 
3086 GLUCPOC 123* 272* 192* 220* 146* Recent Labs  
  03/13/20 
0710 03/12/20 
0955 03/11/20 
9102 WBC 9.4 7.7 7.7 HGB 8.8* 8.2* 8.3* HCT 28.8* 26.9* 27.2*  
 190 166 INR 3.9* 4.3* 3.3 Recent Labs  
  03/13/20 
0710 03/12/20 
0955 03/11/20 
5576  142 140  
K 4.0 4.3 4.3  108* 107 CO2 32 30 26 * 183* 121* BUN 18 23 19 CREA 1.98* 2.19* 1.72* CA 8.5 8.2* 7.9* No results for input(s): PH, PCO2, PO2, HCO3, PHI, PCO2I, PO2I, HCO3I in the last 72 hours. No results for input(s): LCAD, LAC in the last 72 hours. No results for input(s): PH, PCO2, PO2, HCO3, PHI, PCO2I, PO2I, HCO3I in the last 72 hours. Patient Active Problem List  
Diagnosis Code  DM type 2 (diabetes mellitus, type 2) ([facility] E11.9  
 Gout M10.9  BPH (benign prostatic hyperplasia) N40.0  Seasonal allergic rhinitis J30.2  
 HLD (hyperlipidemia) E78.5  Nonrheumatic aortic valve stenosis I35.0  Obesity, morbid ([address] E66.01  
 Type 2 diabetes with nephropathy (HCC) E11.21  
 Bilateral carotid artery stenosis I65.23  
 Aortic valve stenosis I35.0  
 CAD (coronary artery disease) I25.10  S/P CABG x 3 Z95.1  
 S/P AVR Z95.2  Atrial fibrillation (HCC) I48.91  
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  
 Pneumonia due to infectious organism J18.9  Hypotension I95.9  Acute renal failure on dialysis (HCC) N17.9, Z99.2  DNR (do not resuscitate) [phone]  Acute on chronic respiratory failure with hypoxia ([facility]) J96.21 Assessment:  (Medical Decision Making) Hospital Problems  Date Reviewed: 3/9/2020 Codes Class Noted POA Acute on chronic respiratory failure with hypoxia [facility]) ICD-10-CM: J96.21 
ICD-9-CM: 518.84, 799.02  3/8/2020 Unknown Oxygen being weaned DNR (do not resuscitate) (Chronic) ICD-10-CM: I64 ICD-9-CM: V49.86  2/26/2020 Yes Pt's request   
 Acute renal failure on dialysis [facility]) ICD-10-CM: N17.9, Z99.2 ICD-9-CM: 584.9, V45.11  2/25/2020 Yes ESRD-on HD schedule, per nephrology Hypotension (Chronic) ICD-10-CM: I95.9 ICD-9-CM: 458.9  2/18/2020 Yes Resolving Pneumonia due to infectious organism ICD-10-CM: J18.9 ICD-9-CM: 136.9, 484.8  2/14/2020 Unknown On cefepime and Vanc HIT (heparin-induced thrombocytopenia) (HCC) (Chronic) ICD-10-CM: X19.67 ICD-9-CM: 289.84  1/23/2020 Yes On coumadin, INR 2.5 Atrial fibrillation (HCC) (Chronic) ICD-10-CM: I48.91 
ICD-9-CM: 427.31  1/13/2020 Yes Paced now. CAD (coronary artery disease) (Chronic) ICD-10-CM: I25.10 ICD-9-CM: 414.00  1/10/2020 Yes S/p CABG   
 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 
ICD-9-CM: 250.40, 583.81  8/26/2019 Yes SSI Obesity, morbid ([facility] (Chronic) ICD-10-CM: E66.01 
ICD-9-CM: 278.01  10/19/2018 Yes  
 BMI > 40 HLD (hyperlipidemia) (Chronic) ICD-10-CM: I38.4 ICD-9-CM: 272.4  1/14/2013 Yes Chronic Pulmonary exam improving, particularly on dialysis days. L effusion persists on CXR and thoracentesis possible, though limited by warfarin. Plan:  (Medical Decision Making)  
--wean oxygen --cPAP nightly 
--ID following, on Cefepime and Vanc 3/6-13 
--remains on warfarin for LILIBETH. Can transition to argatroban and consider thoracentesis next week if pleural effusion persists despite dialysis. More than 50% of the time documented was spent in face-to-face contact with the patient and in the care of the patient on the floor/unit where the patient is located.  
 
Martin Gambino MD

## 2020-03-13 NOTE — PROGRESS NOTES
Problem: Pressure Injury - Risk of 
Goal: *Prevention of pressure injury Description: Document Alfredo Scale and appropriate interventions in the flowsheet. Outcome: Progressing Towards Goal 
Note: Pressure Injury Interventions: 
Sensory Interventions: Assess changes in LOC Moisture Interventions: Absorbent underpads Activity Interventions: Pressure redistribution bed/mattress(bed type), PT/OT evaluation Mobility Interventions: Pressure redistribution bed/mattress (bed type), PT/OT evaluation Nutrition Interventions: Document food/fluid/supplement intake Friction and Shear Interventions: Lift sheet, Foam dressings/transparent film/skin sealants Problem: Falls - Risk of 
Goal: *Absence of Falls Description: Document Kym Davis Fall Risk and appropriate interventions in the flowsheet. Outcome: Progressing Towards Goal 
Note: Fall Risk Interventions: 
Mobility Interventions: Communicate number of staff needed for ambulation/transfer Mentation Interventions: Adequate sleep, hydration, pain control Medication Interventions: Evaluate medications/consider consulting pharmacy Elimination Interventions: Call light in reach, Patient to call for help with toileting needs, Toileting schedule/hourly rounds History of Falls Interventions: Bed/chair exit alarm Problem: Patient Education: Go to Patient Education Activity Goal: Patient/Family Education Outcome: Progressing Towards Goal 
  
Problem: Breathing Pattern - Ineffective Goal: *Absence of hypoxia Outcome: Progressing Towards Goal 
  
Problem: Nutrition Deficit Goal: *Optimize nutritional status Outcome: Progressing Towards Goal 
  
Problem: Gas Exchange - Impaired Goal: *Absence of hypoxia Outcome: Progressing Towards Goal 
  
Problem: Risk for Spread of Infection Goal: Prevent transmission of infectious organism to others Description: Prevent the transmission of infectious organisms to other patients, staff members, and visitors. Outcome: Progressing Towards Goal 
  
Problem: Patient Education:  Go to Education Activity Goal: Patient/Family Education Outcome: Progressing Towards Goal

## 2020-03-13 NOTE — PROGRESS NOTES
Renal Progress Note Admission Date: 2/13/2020 Subjective:  
 
Labs and chart reviewed ROS: 
General: no fever/ chills, appetite ok Lung: ++ SOB 
CV: no CP 
GI: no N/V/D Ext: right wrist edema Objective:  
 
Physical Exam:   
Patient Vitals for the past 8 hrs: 
 BP Temp Pulse Resp SpO2 Weight 03/13/20 1048     97 %   
03/13/20 1015 114/63       
03/13/20 0724 140/71 97.4 °F (36.3 °C) 78 28 98 %   
03/13/20 0656      116.5 kg (256 lb 12.8 oz) 03/13/20 0350 113/67 98.2 °F (36.8 °C) 86 19 98 %  Gen: mild SOB  
 
CV: regular rate, S1, S2, no Rub Lungs: decreased BS with diffuse bronchi. Abd: soft, non tender, + BS Extem: no LE edema, right wrist trace edema, left foot necrotic toes Access: Left TCC- intact Current Facility-Administered Medications Medication Dose Route Frequency  warfarin (COUMADIN) tablet 4 mg  4 mg Oral ONCE  
 [START ON 3/14/2020] warfarin (COUMADIN) tablet 5 mg  5 mg Oral QPM  
 insulin lispro (HUMALOG) injection   SubCUTAneous AC&HS  
 dextrose 40% (GLUTOSE) oral gel 1 Tube  15 g Oral PRN  
 glucagon (GLUCAGEN) injection 1 mg  1 mg IntraMUSCular PRN  
 dextrose (D50W) injection syrg 12.5-25 g  25-50 mL IntraVENous PRN  
 HYDROcodone-homatropine (HYCODAN) 5-1.5 mg/5 mL (5 mL) syrup 5 mL  5 mL Oral Q4H PRN  
 benzonatate (TESSALON) capsule 100 mg  100 mg Oral TID PRN  
 guaiFENesin ER (MUCINEX) tablet 1,200 mg  1,200 mg Oral Q12H  
 albuterol (PROVENTIL VENTOLIN) nebulizer solution 2.5 mg  2.5 mg Nebulization Q6H RT  
 insulin glargine (LANTUS) injection 5 Units  5 Units SubCUTAneous QHS  mirtazapine (REMERON) tablet 7.5 mg  7.5 mg Oral QHS  sertraline (ZOLOFT) tablet 25 mg  25 mg Oral DAILY  ALPRAZolam (XANAX) tablet 0.25 mg  0.25 mg Oral Q6H PRN  
 sodium chloride (OCEAN) 0.65 % nasal squeeze bottle 2 Spray  2 Spray Both Nostrils Q2H PRN  
 morphine injection 2 mg  2 mg IntraVENous Q6H PRN  
  sodium chloride 3% hypertonic nebulizer soln  4 mL Nebulization BID RT  
 sodium chloride (OCEAN) 0.65 % nasal squeeze bottle 2 Spray  2 Spray Both Nostrils BID  epoetin maritza-epbx (RETACRIT) 14,000 Units combo injection  14,000 Units SubCUTAneous Q7D  
 polyethylene glycol (MIRALAX) packet 17 g  17 g Oral DAILY  midodrine (PROAMITINE) tablet 10 mg  10 mg Oral TID WITH MEALS  tamsulosin (FLOMAX) capsule 0.4 mg  0.4 mg Oral QHS  loperamide (IMODIUM) capsule 4 mg  4 mg Oral QID PRN  pantothenic ac-min oil-pet,hyd (AQUAPHOR) 41 % ointment   Topical DAILY  traMADol (ULTRAM) tablet 50 mg  50 mg Oral Q6H PRN  
 sodium chloride (NS) flush 5-40 mL  5-40 mL IntraVENous PRN  
 acetaminophen (TYLENOL) tablet 650 mg  650 mg Oral Q4H PRN  
 naloxone (NARCAN) injection 0.4 mg  0.4 mg IntraVENous PRN  
 ondansetron (ZOFRAN) injection 4 mg  4 mg IntraVENous Q4H PRN  
 bisacodyL (DULCOLAX) tablet 5 mg  5 mg Oral DAILY PRN Data Review:  
 
LABS:  
Recent Results (from the past 12 hour(s)) GLUCOSE, POC Collection Time: 03/13/20  5:53 AM  
Result Value Ref Range Glucose (POC) 123 (H) 65 - 100 mg/dL PROTHROMBIN TIME + INR Collection Time: 03/13/20  7:10 AM  
Result Value Ref Range Prothrombin time 39.7 (H) 12.0 - 14.7 sec INR 3.9 (HH) METABOLIC PANEL, BASIC Collection Time: 03/13/20  7:10 AM  
Result Value Ref Range Sodium 141 136 - 145 mmol/L Potassium 4.0 3.5 - 5.1 mmol/L Chloride 106 98 - 107 mmol/L  
 CO2 32 21 - 32 mmol/L Anion gap 3 (L) 7 - 16 mmol/L Glucose 117 (H) 65 - 100 mg/dL BUN 18 8 - 23 MG/DL Creatinine 1.98 (H) 0.8 - 1.5 MG/DL  
 GFR est AA 43 (L) >60 ml/min/1.73m2 GFR est non-AA 35 (L) >60 ml/min/1.73m2 Calcium 8.5 8.3 - 10.4 MG/DL  
CBC W/O DIFF Collection Time: 03/13/20  7:10 AM  
Result Value Ref Range WBC 9.4 4.3 - 11.1 K/uL  
 RBC 3.04 (L) 4.23 - 5.6 M/uL HGB 8.8 (L) 13.6 - 17.2 g/dL HCT 28.8 (L) 41.1 - 50.3 % MCV 94.7 79.6 - 97.8 FL  
 MCH 28.9 26.1 - 32.9 PG  
 MCHC 30.6 (L) 31.4 - 35.0 g/dL  
 RDW 15.4 (H) 11.9 - 14.6 % PLATELET 274 840 - 811 K/uL MPV 10.0 9.4 - 12.3 FL ABSOLUTE NRBC 0.00 0.0 - 0.2 K/uL Plan: Active Problems: 
  DM type 2 (diabetes mellitus, type 2) ([facility] (1/14/2013) HLD (hyperlipidemia) (1/14/2013) Obesity, morbid ([facility] (10/19/2018) Type 2 diabetes with nephropathy ([facility] (8/26/2019) CAD (coronary artery disease) (1/10/2020) S/P CABG x 3 (1/10/2020) S/P AVR (1/10/2020) Atrial fibrillation ([facility] (1/13/2020) HIT (heparin-induced thrombocytopenia) ([facility] (1/23/2020) Pneumonia due to infectious organism (2/14/2020) Hypotension (2/18/2020) Acute renal failure on dialysis ([facility] (2/25/2020) DNR (do not resuscitate) (2/26/2020) Acute on chronic respiratory failure with hypoxia ([facility] (3/8/2020) DEJUAN/CKD 
- ([place] TTS schedule) 
-no sign of renal recovery, HD if no improvement of SOB with bronchodilator  
  
HIT/SHARON + 
  
ASHD s/p CABG 
  
Orthostatic Hypotension - on midodrine 
  
 Anemia - s/p IV Fe replacement and increase shelton Acute respiratory failure,- albuterol and CPAP @[facility] pulmonary following 
-on cefepime and vancomycin for HCAP per ID,

## 2020-03-13 NOTE — INTERDISCIPLINARY ROUNDS
Interdisciplinary team rounds were held 3/13/2020 with the following team members:Care Management, Physical Therapy, Physician and Clinical Coordinator and the patient. Plan of care discussed. See clinical pathway and/or care plan for interventions and desired outcomes.

## 2020-03-14 NOTE — PROGRESS NOTES
Problem: Pressure Injury - Risk of 
Goal: *Prevention of pressure injury Description: Document Alfredo Scale and appropriate interventions in the flowsheet. Outcome: Progressing Towards Goal 
Note: Pressure Injury Interventions: 
Sensory Interventions: Assess changes in LOC Moisture Interventions: Absorbent underpads Activity Interventions: Pressure redistribution bed/mattress(bed type), PT/OT evaluation Mobility Interventions: Pressure redistribution bed/mattress (bed type), PT/OT evaluation Nutrition Interventions: Document food/fluid/supplement intake Friction and Shear Interventions: Lift sheet, Foam dressings/transparent film/skin sealants Problem: Falls - Risk of 
Goal: *Absence of Falls Description: Document Clide Failing Fall Risk and appropriate interventions in the flowsheet. Outcome: Progressing Towards Goal 
Note: Fall Risk Interventions: 
Mobility Interventions: Communicate number of staff needed for ambulation/transfer Mentation Interventions: Adequate sleep, hydration, pain control Medication Interventions: Evaluate medications/consider consulting pharmacy Elimination Interventions: Call light in reach, Patient to call for help with toileting needs, Toileting schedule/hourly rounds History of Falls Interventions: Bed/chair exit alarm Problem: Breathing Pattern - Ineffective Goal: *Absence of hypoxia Outcome: Progressing Towards Goal 
  
Problem: Nutrition Deficit Goal: *Optimize nutritional status Outcome: Progressing Towards Goal 
  
Problem: Risk for Spread of Infection Goal: Prevent transmission of infectious organism to others Description: Prevent the transmission of infectious organisms to other patients, staff members, and visitors. Outcome: Progressing Towards Goal 
  
Problem: Gas Exchange - Impaired Goal: *Absence of hypoxia Outcome: Progressing Towards Goal 
  
Problem: Gas Exchange - Impaired Goal: *Absence of hypoxia Outcome: Progressing Towards Goal

## 2020-03-14 NOTE — PROGRESS NOTES
Received bedside shift report from off going nurse, Lucia Rutherford. Patient resting in bed quietly. Respirations even and unlabored on 4 L via NC. Bed low and locked. Bedside table, personal belongings and call light all within reach.

## 2020-03-14 NOTE — DIALYSIS
TRANSFER IN - DIALYSIS Received patient in dialysis unit  from room 824 (unit) for ordered procedure. Consent verified for renal replacement therapy. Patient alert and vital signs stable. /71 P 90 Hemodialysis initiated using right perm cath. Aspirated and flushed both ports without difficulty. Dressing clean, dry and intact. Machine settings per MD order. Heparin 0 unit bolus and 0 units/hr. Will monitor during treatment.

## 2020-03-14 NOTE — PROGRESS NOTES
Critical Care Daily Progress Note: 3/14/2020 Immanuel Dial Admission Date: 2/13/2020 The patient's chart is reviewed and the patient is discussed with the staff. 68 yo Lyudmila Nielsen a history of CAD s/p CABG with AVR 1/10/20 by Dr. Payton Dandy complicated by CKD requiring HD, HIT +, DVTs, wound left thigh and pneumonia.  Was discharged on Levaquin Q 48 hours last dose 2/11/20. [facility] was discharged on NC 2 L but has been increased to NC 4L O2 at [facility]. He presented to the ER on 2/15 with increased SOB. CTA chest was negative for PE but persistent LLL opacity. He had a bronch 2/17 with negative cultures and was treated for PNA using levaquin. He is now ESRD and being followed by nephrology. We were reconsulted on 3/6 for acute respiratory failure requiring bipap. CXR shows increased consolidation in the left base. He is febrile with a temp of 100.5. He has refused a swallow evaluation. ID is following and he was started on Vanc and Cefepime on 3/6. Subjective:  
1.3kg removal yesterday on dialysis with net balance 1L neg with some hypotension yesterday. INR remains elevated at 3.9 and warfarin held in anticipation of possible thoracentesis, which he is highly anxious about. Reports he actually feels better. Current Facility-Administered Medications Medication Dose Route Frequency  furosemide (LASIX) injection 80 mg  80 mg IntraVENous BID  loratadine (CLARITIN) tablet 10 mg  10 mg Oral DAILY PRN  
 bacitracin 500 unit/gram ointment   Topical BID  insulin lispro (HUMALOG) injection   SubCUTAneous AC&HS  
 dextrose 40% (GLUTOSE) oral gel 1 Tube  15 g Oral PRN  
 glucagon (GLUCAGEN) injection 1 mg  1 mg IntraMUSCular PRN  
 dextrose (D50W) injection syrg 12.5-25 g  25-50 mL IntraVENous PRN  
 HYDROcodone-homatropine (HYCODAN) 5-1.5 mg/5 mL (5 mL) syrup 5 mL  5 mL Oral Q4H PRN  
 benzonatate (TESSALON) capsule 100 mg  100 mg Oral TID PRN  
  guaiFENesin ER (MUCINEX) tablet 1,200 mg  1,200 mg Oral Q12H  
 albuterol (PROVENTIL VENTOLIN) nebulizer solution 2.5 mg  2.5 mg Nebulization Q6H RT  
 insulin glargine (LANTUS) injection 5 Units  5 Units SubCUTAneous QHS  mirtazapine (REMERON) tablet 7.5 mg  7.5 mg Oral QHS  sertraline (ZOLOFT) tablet 25 mg  25 mg Oral DAILY  ALPRAZolam (XANAX) tablet 0.25 mg  0.25 mg Oral Q6H PRN  
 sodium chloride (OCEAN) 0.65 % nasal squeeze bottle 2 Spray  2 Spray Both Nostrils Q2H PRN  
 morphine injection 2 mg  2 mg IntraVENous Q6H PRN  
 sodium chloride 3% hypertonic nebulizer soln  4 mL Nebulization BID RT  
 sodium chloride (OCEAN) 0.65 % nasal squeeze bottle 2 Spray  2 Spray Both Nostrils BID  epoetin maritza-epbx (RETACRIT) 14,000 Units combo injection  14,000 Units SubCUTAneous Q7D  
 polyethylene glycol (MIRALAX) packet 17 g  17 g Oral DAILY  midodrine (PROAMITINE) tablet 10 mg  10 mg Oral TID WITH MEALS  tamsulosin (FLOMAX) capsule 0.4 mg  0.4 mg Oral QHS  loperamide (IMODIUM) capsule 4 mg  4 mg Oral QID PRN  pantothenic ac-min oil-pet,hyd (AQUAPHOR) 41 % ointment   Topical DAILY  traMADol (ULTRAM) tablet 50 mg  50 mg Oral Q6H PRN  
 sodium chloride (NS) flush 5-40 mL  5-40 mL IntraVENous PRN  
 acetaminophen (TYLENOL) tablet 650 mg  650 mg Oral Q4H PRN  
 naloxone (NARCAN) injection 0.4 mg  0.4 mg IntraVENous PRN  
 ondansetron (ZOFRAN) injection 4 mg  4 mg IntraVENous Q4H PRN  
 bisacodyL (DULCOLAX) tablet 5 mg  5 mg Oral DAILY PRN Review of Systems 
+cough-yellow/gray sputum +LUE pain/edema 
+dyspnea Constitutional:  negative for fever, chills, sweats Cardiovascular:  negative for chest pain, palpitations, syncope, edema Gastrointestinal:  negative for dysphagia, reflux, vomiting, diarrhea, abdominal pain, or melena Neurologic:  negative for focal weakness, numbness, headache Objective:  
 
Vitals:  
 03/14/20 0542 03/14/20 0402 03/14/20 7626 03/14/20 7611 BP:  102/62  154/83 Pulse:  81  85 Resp:  18  19 Temp:  97.3 °F (36.3 °C)  97.8 °F (36.6 °C) SpO2: 95% 95%  95% Weight:   229 lb 3.2 oz (104 kg) Height:      
 
 
 
Intake/Output Summary (Last 24 hours) at 3/14/2020 6400 Last data filed at 3/14/2020 3094 Gross per 24 hour Intake 300 ml Output 1500 ml Net -1200 ml Physical Exam:         
Constitutional:  the patient is well developed and in no acute distress, on  
Respiratory: improved crackles Cardiovascular:  RRR without M,G,R 
Gastrointestinal: soft and non-tender; with positive bowel sounds. Musculoskeletal: warm without cyanosis. Skin:  no jaundice or rashes Neurologic: no gross neuro deficits Psychiatric:  alert and oriented x 3 LINES: 
Peripheral IV L arm, HD access DRIPS: 
none CXR:  
 
 
LAB Recent Labs  
  03/14/20 
0535 03/13/20 
2041 03/13/20 
1705 03/13/20 
1124 03/13/20 
0553 GLUCPOC 110* 288* 177* 247* 123* Recent Labs  
  03/13/20 
0710 03/12/20 
7406 WBC 9.4 7.7 HGB 8.8* 8.2* HCT 28.8* 26.9*  
 190 INR 3.9* 4.3* Recent Labs  
  03/13/20 
0710 03/12/20 
9385  142  
K 4.0 4.3  108* CO2 32 30 * 183* BUN 18 23 CREA 1.98* 2.19* CA 8.5 8.2* No results for input(s): PH, PCO2, PO2, HCO3, PHI, PCO2I, PO2I, HCO3I in the last 72 hours. No results for input(s): LCAD, LAC in the last 72 hours. No results for input(s): PH, PCO2, PO2, HCO3, PHI, PCO2I, PO2I, HCO3I in the last 72 hours. Patient Active Problem List  
Diagnosis Code  DM type 2 (diabetes mellitus, type 2) ([facility] [address] E11.9  
 Gout M10.9  BPH (benign prostatic hyperplasia) N40.0  Seasonal allergic rhinitis J30.2  
 HLD (hyperlipidemia) E78.5  Nonrheumatic aortic valve stenosis I35.0  Obesity, morbid ([address] E66.01  
 Type 2 diabetes with nephropathy (HCC) E11.21  
 Bilateral carotid artery stenosis I65.23  
 Aortic valve stenosis I35.0  
 CAD (coronary artery disease) I25.10  S/P CABG x 3 Z95.1  
 S/P AVR Z95.2  Atrial fibrillation (HCC) I48.91  
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  
 Pneumonia due to infectious organism J18.9  Hypotension I95.9  Acute renal failure on dialysis (HCC) N17.9, Z99.2  DNR (do not resuscitate) [phone]  Acute on chronic respiratory failure with hypoxia (HCC) J96.21 Assessment:  (Medical Decision Making) Hospital Problems  Date Reviewed: 3/9/2020 Codes Class Noted POA Acute on chronic respiratory failure with hypoxia [facility]) ICD-10-CM: J96.21 
ICD-9-CM: 518.84, 799.02  3/8/2020 Unknown Oxygen being weaned DNR (do not resuscitate) (Chronic) ICD-10-CM: M07 ICD-9-CM: V49.86  2/26/2020 Yes Pt's request   
 Acute renal failure on dialysis [facility]) ICD-10-CM: N17.9, Z99.2 ICD-9-CM: 584.9, V45.11  2/25/2020 Yes ESRD-on HD schedule, per nephrology Hypotension (Chronic) ICD-10-CM: I95.9 ICD-9-CM: 458.9  2/18/2020 Yes Resolving Pneumonia due to infectious organism ICD-10-CM: J18.9 ICD-9-CM: 136.9, 484.8  2/14/2020 Unknown On cefepime and Vanc HIT (heparin-induced thrombocytopenia) (HCC) (Chronic) ICD-10-CM: F42.74 ICD-9-CM: 289.84  1/23/2020 Yes Holding coumadin. Use argatroban if subtherapeutic, pending possible thoracentesis in futre. Atrial fibrillation (HCC) (Chronic) ICD-10-CM: I48.91 
ICD-9-CM: 427.31  1/13/2020 Yes Paced now. CAD (coronary artery disease) (Chronic) ICD-10-CM: I25.10 ICD-9-CM: 414.00  1/10/2020 Yes S/p CABG   
 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 
ICD-9-CM: 250.40, 583.81  8/26/2019 Yes SSI Obesity, morbid ([address] (Chronic) ICD-10-CM: E66.01 
ICD-9-CM: 278.01  10/19/2018 Yes  
 BMI > 40 HLD (hyperlipidemia) (Chronic) ICD-10-CM: V66.3 ICD-9-CM: 272.4  1/14/2013 Yes Chronic Pulmonary exam improving, particularly on dialysis days. L effusion persists on CXR and thoracentesis possible, though limited by warfarin. Plan:  (Medical Decision Making)  
--wean oxygen, encourage IS 
--cPAP nightly 
--completed HCAP coverage on 3/13. 
--remains on warfarin for LILIBETH. Can transition to argatroban and consider thoracentesis next week if pleural effusion persists despite dialysis. Will repeat CXR Monday, when we plan to resume daily rounding. More than 50% of the time documented was spent in face-to-face contact with the patient and in the care of the patient on the floor/unit where the patient is located.  
 
Vicky Alejandra MD

## 2020-03-14 NOTE — DIALYSIS
TRANSFER OUT- DIALYSIS Hemodialysis treatment completed without complications. Patient alert and VS stable  /64  P85    
 
 3.4 Kgs removed. Flushed both ports with 10 mL of NS.  CVC dressing clean, dry, and intact, tego caps intact, bilateral lumens wrapped with 4x4 gauze. Meds given-0. 0 units of RBCs given during dialysis. Patient to room 23-14-20-09 after dialysis.

## 2020-03-14 NOTE — PROGRESS NOTES
Reviewed notes for spiritual concerns prior to visit Visit with patient to build rapport with . Calm Encouraged with presence and words of hope Clyde Dow,  Staff  C: [phone]  /  [email or web address]

## 2020-03-14 NOTE — PROGRESS NOTES
Warfarin dosing per pharmacist 
 
Ángel Harrison is a 69 y.o. male. Height: 5' 10\" (177.8 cm)   Weight 103.5 kg (228 lbs) Indication:  AVR and afib Goal INR:  2.5 - 3.5 Home dose:  2.5 mg daily Risk factors or significant drug interactions: HIT+(SHARON positive on 1/18/20), amiodarone (dc'd 2/5), Levofloxacin (2/13- 2/20 ), mirtazapine (2/13-2/24), escitalopram (2/19-2/20), trazodone (started 2/24) Other anticoagulants:  N/A Daily Monitoring Date  INR     Warfarin dose HGB              Notes 2/14  2.4  3 mg  8.7 2/15  2.2  4 mg  9.6 2/16  2.1  5 mg  9.8 2/17  2.1  5 mg  9.5 2/18  2.8  2 mg  9.3 2/19  2.5  2 mg  8.7 
2/20  1.9  3 mg   8.8 
2/21  1.9  4 mg  8.6 
2/22  2.1  3 mg  8.5 
2/23  1.9  4 mg  --- 
2/24  1.7  3 mg + 2 mg --- 
2/25  1.7  5 mg  --- 
2/26  1.6  6 mg  --- 
2/27  1.7  6 mg   --- 
2/28  1.7  7.5 mg  --- 
2/29  2.1 ` 6 mg  7.7 
3/1  2.0  7.5 mg  --- 
3/2  2.9  6 mg  --- 
3/3  3.4  5 mg  8.0 
3/4  2.7  7.5mg  8.6 
3/5  2.9  6 mg  8.0 
3/6  3.1  Held  8.4 
3/7  2.6  6 mg  8.0 
3/8  2.7  6mg  --- 
3/9  2.5  6 mg  --- 
3/10  2.7  6 mg  7.6 
3/11  3.3  5 mg  8.3 
3/12  4.3  Held  8.2 
3/13  3.9  Held  8.8 
3/14  3.5  Hold Pulmonary considering thoracentesis next week. Continue to hold warfarin and let INR drift down. When INR drops to 2.5, patient will need to be started on argatroban bridge(HIT +). Daily INR. Thank you, Los Brink, PharmD, [facility] Clinical Pharmacist 
[phone]

## 2020-03-14 NOTE — PROGRESS NOTES
Hospitalist Note Admit Date:  2020  3:03 PM  
Name:  Ovidio Welsh Age:  69 y.o. 
:  1945 MRN:  [id number] PCP:  Iris Rodriguez MD 
Treatment Team: Attending Provider: Tiffany Langston MD; Consulting Provider: Andreina Betancourt MD; Utilization Review: Matthias Ledbetter RN; Hospitalist: Susu Torres NP; Hospitalist: Tia Oates NP; Consulting Provider: Deborah Yin MD; Consulting Provider: Tahir Everett MD; Care Manager: James Little.; Utilization Review: Radha Hill; Consulting Provider: Jayde Nash MD; Consulting Provider: Susana Wooten MD 
 
HPI/Subjective:  
32-year-old male admitted with acute hypoxemic respiratory failure and progressive left lower lobe consolidation. Infectious disease and pulmonology are following and applied the patient on vancomycin and cefepime for failure of levofloxacin therapy as an outpatient. Patient had a bronchoscopy on 217 per pulmonology and that final result is pending. Patient recently underwent CABG and aortic valve repair in 2020 and had resultant atrial fibrillation. Post CABG patient developed renal failure and is requiring hemodialysis for renal clearance. 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 Patient diagnosed with pneumonia post CABG and discharged home with levofloxacin. Pulmonology feel the patient will likely need to have a thoracentesis in the near future therefore have coordinated with pharmacy to have the patient started on argatroban once his INR gets to the low end of 2. Patient was previously on argatroban at 0.5 mcg/kg/min and this will likely be the best starting dose for him. Pending placement to LTAC. Today: No complaints or concerns today. Seen in dialysis. INR still greater than 3, continue to hold warfarin. Patient with passive suicidal ideation and stating that he wants to give up. This is been a problem in the past, reconsult tele-psychiatry. Objective:  
 
Patient Vitals for the past 24 hrs: 
 Temp Pulse Resp BP SpO2  
03/14/20 1521 97.9 °F (36.6 °C) 85 16 126/74 99 % 03/14/20 1452  85  118/64   
03/14/20 1432  83  117/74   
03/14/20 1359  84  111/68   
03/14/20 1325  82  112/59   
03/14/20 1259  82  108/63   
03/14/20 1244  83  118/64   
03/14/20 1211  84  123/63   
03/14/20 1130  85  129/74   
03/14/20 1059  90  (!) 127/91   
03/14/20 0921     93 % 03/14/20 0846 97.8 °F (36.6 °C) 85 19 154/83 95 % 03/14/20 0402 97.3 °F (36.3 °C) 81 18 102/62 95 % 03/14/20 0254     95 % 03/13/20 2332 98 °F (36.7 °C) 79 18 126/71 95 % 03/13/20 2055     98 % 03/13/20 1948 98.3 °F (36.8 °C) 79 18 109/60 98 % 03/13/20 1703 97.8 °F (36.6 °C) 84 19 137/66 96 % 03/13/20 1638  86  99/61   
03/13/20 1632  (!) 103  102/67  Oxygen Therapy O2 Sat (%): 99 % (03/14/20 1521) Pulse via Oximetry: 88 beats per minute (03/14/20 0921) O2 Device: CPAP mask (03/14/20 1617) PEEP/CPAP (cm H2O): 12 cm H20 (03/14/20 1617) O2 Flow Rate (L/min): 4 l/min (03/14/20 0921) O2 Temperature: 87.8 °F (31 °C) (03/10/20 0618) FIO2 (%): 35 % (03/14/20 1617) Estimated body mass index is 32.89 kg/m² as calculated from the following: 
  Height as of this encounter: 5' 10\" (1.778 m). Weight as of this encounter: 104 kg (229 lb 3.2 oz). Intake/Output Summary (Last 24 hours) at 3/14/2020 1624 Last data filed at 3/14/2020 1452 Gross per 24 hour Intake 200 ml Output 4800 ml Net -4600 ml *Note that automatically entered I/Os may not be accurate; dependent on patient compliance with collection and accurate  by techs. Physical Exam:  
General:     alert, awake, no acute distress. Obese. Head:   normocephalic, atraumatic Eyes, Ears, nose: PERRL, EOMI. NC Neck:    supple, non-tender Lungs:   Wheezing Cardiac:   RRR, Normal S1 and S2. Abdomen:   Soft, non distended, nontender, +BS Extremities:   Warm, dry. B/l stasis dermatitis with mild edema. Left forearm and hand swelling Skin:   No rashes, no jaundice Neuro:  Alert and oriented to person, time, place, situation. No gross focal neurological deficit Psychiatric:  No anxiety, calm, cooperative Data Review: 
I have reviewed all labs, meds, and studies from the last 24 hours: 
 
Recent Results (from the past 24 hour(s)) GLUCOSE, POC Collection Time: 03/13/20  5:05 PM  
Result Value Ref Range Glucose (POC) 177 (H) 65 - 100 mg/dL GLUCOSE, POC Collection Time: 03/13/20  8:41 PM  
Result Value Ref Range Glucose (POC) 288 (H) 65 - 100 mg/dL GLUCOSE, POC Collection Time: 03/14/20  5:35 AM  
Result Value Ref Range Glucose (POC) 110 (H) 65 - 100 mg/dL PROTHROMBIN TIME + INR Collection Time: 03/14/20  8:33 AM  
Result Value Ref Range Prothrombin time 36.4 (H) 12.0 - 14.7 sec INR 3.5 METABOLIC PANEL, BASIC Collection Time: 03/14/20  8:33 AM  
Result Value Ref Range Sodium 139 136 - 145 mmol/L Potassium 4.5 3.5 - 5.1 mmol/L Chloride 105 98 - 107 mmol/L  
 CO2 27 21 - 32 mmol/L Anion gap 7 7 - 16 mmol/L Glucose 112 (H) 65 - 100 mg/dL BUN 15 8 - 23 MG/DL Creatinine 2.42 (H) 0.8 - 1.5 MG/DL  
 GFR est AA 34 (L) >60 ml/min/1.73m2 GFR est non-AA 28 (L) >60 ml/min/1.73m2 Calcium 8.7 8.3 - 10.4 MG/DL  
GLUCOSE, POC Collection Time: 03/14/20  3:28 PM  
Result Value Ref Range Glucose (POC) 200 (H) 65 - 100 mg/dL All Micro Results Procedure Component Value Units Date/Time CULTURE, RESPIRATORY/SPUTUM/BRONCH Elease Hussar STAIN [[id number]]  (Abnormal) Collected:  03/09/20 4945 Order Status:  Completed Specimen:  Sputum Updated:  03/12/20 7098 Special Requests: NO SPECIAL REQUESTS     
  GRAM STAIN 10 TO 50 WBC'S/OIF  
   0 TO 2 EPITHELIAL CELLS/OIF  
   FEW GRAM POSITIVE RODS MODERATE YEAST    4+ MUCUS PRESENT     
 Culture result: MODERATE CANDIDA ALBICANS     
 CULTURE, BLOOD [[id number]] Collected:  03/06/20 0944 Order Status:  Completed Specimen:  Blood Updated:  03/11/20 6054 Special Requests: --     
  RIGHT Antecubital 
  
  Culture result: NO GROWTH 5 DAYS     
 CULTURE, BLOOD [[id number]] Collected:  03/06/20 9868 Order Status:  Completed Specimen:  Blood Updated:  03/11/20 2857 Special Requests: --     
  LEFT Antecubital 
  
  Culture result: NO GROWTH 5 DAYS     
 CULTURE, RESPIRATORY/SPUTUM/BRONCH Katelin Sacks STAIN [[id number]] Collected:  03/06/20 1600 Order Status:  Canceled Specimen:  Sputum C. DIFFICILE AG & TOXIN A/B [[id number]] Order Status:  Canceled Specimen:  Stool CULTURE, RESPIRATORY/SPUTUM/BRONCH Lowellchristi Robles [[id number]] Collected:  02/17/20 1220 Order Status:  Completed Specimen:  Sputum from Bronchial Washing Updated:  02/24/20 1244 Special Requests: NO SPECIAL REQUESTS     
  GRAM STAIN 20 TO 35 WBCS SEEN PER OIF  
   1 TO 2 EPITHELIAL CELLS SEEN PER OIF  
      
  MODERATE GRAM POSITIVE COCCI  
     
   FEW GRAM POSITIVE RODS Culture result:    
  LIGHT NORMAL RESPIRATORY BRENNAN Current Meds: 
Current Facility-Administered Medications Medication Dose Route Frequency  furosemide (LASIX) injection 80 mg  80 mg IntraVENous BID  loratadine (CLARITIN) tablet 10 mg  10 mg Oral DAILY PRN  
 bacitracin 500 unit/gram ointment   Topical BID  insulin lispro (HUMALOG) injection   SubCUTAneous AC&HS  
 dextrose 40% (GLUTOSE) oral gel 1 Tube  15 g Oral PRN  
 glucagon (GLUCAGEN) injection 1 mg  1 mg IntraMUSCular PRN  
 dextrose (D50W) injection syrg 12.5-25 g  25-50 mL IntraVENous PRN  
 HYDROcodone-homatropine (HYCODAN) 5-1.5 mg/5 mL (5 mL) syrup 5 mL  5 mL Oral Q4H PRN  
 benzonatate (TESSALON) capsule 100 mg  100 mg Oral TID PRN  
 guaiFENesin ER (MUCINEX) tablet 1,200 mg  1,200 mg Oral Q12H  albuterol (PROVENTIL VENTOLIN) nebulizer solution 2.5 mg  2.5 mg Nebulization Q6H RT  
 insulin glargine (LANTUS) injection 5 Units  5 Units SubCUTAneous QHS  mirtazapine (REMERON) tablet 7.5 mg  7.5 mg Oral QHS  sertraline (ZOLOFT) tablet 25 mg  25 mg Oral DAILY  ALPRAZolam (XANAX) tablet 0.25 mg  0.25 mg Oral Q6H PRN  
 sodium chloride (OCEAN) 0.65 % nasal squeeze bottle 2 Spray  2 Spray Both Nostrils Q2H PRN  
 morphine injection 2 mg  2 mg IntraVENous Q6H PRN  
 sodium chloride 3% hypertonic nebulizer soln  4 mL Nebulization BID RT  
 sodium chloride (OCEAN) 0.65 % nasal squeeze bottle 2 Spray  2 Spray Both Nostrils BID  epoetin maritza-epbx (RETACRIT) 14,000 Units combo injection  14,000 Units SubCUTAneous Q7D  
 polyethylene glycol (MIRALAX) packet 17 g  17 g Oral DAILY  midodrine (PROAMITINE) tablet 10 mg  10 mg Oral TID WITH MEALS  tamsulosin (FLOMAX) capsule 0.4 mg  0.4 mg Oral QHS  loperamide (IMODIUM) capsule 4 mg  4 mg Oral QID PRN  pantothenic ac-min oil-pet,hyd (AQUAPHOR) 41 % ointment   Topical DAILY  traMADol (ULTRAM) tablet 50 mg  50 mg Oral Q6H PRN  
 sodium chloride (NS) flush 5-40 mL  5-40 mL IntraVENous PRN  
 acetaminophen (TYLENOL) tablet 650 mg  650 mg Oral Q4H PRN  
 naloxone (NARCAN) injection 0.4 mg  0.4 mg IntraVENous PRN  
 ondansetron (ZOFRAN) injection 4 mg  4 mg IntraVENous Q4H PRN  
 bisacodyL (DULCOLAX) tablet 5 mg  5 mg Oral DAILY PRN Other Studies: 
 
Ct Chest W Cont Result Date: 2/14/2020 CTA OF THE CHEST - PE STUDY HISTORY: Acute shortness of breath COMPARISON: None TECHNIQUE: A helical acquisition was performed through the chest utilizing 0.01SW slice thickness during the infusion of 100 cc of Isovue-370. 3-D post-processed images were created on an independent workstation. Multiplanar reformats were obtained. The exam was focused on the pulmonary arteries. Dose reduction techniques used:  Automated exposure control, adjustment of the mAs and/or kVp according to patient's size, and iterative reconstruction techniques. FINDINGS: *  PULMONARY VESSELS: No evidence of pulmonary embolism. *  PLEURA / PERICARDIUM: Left pleural effusion. *  CAROL / MEDIASTINUM: Dilated diffuse fluid-filled esophagus. No evidence of a hiatal hernia. *  LUNGS: Consolidation of the entire left lower lobe. Linear atelectasis of the right middle and right upper lobe. *  TRACHEOBRONCHIAL TREE: Within normal limits. *  AORTA: Within normal limits. *  CORONARY ARTERIES: Extensive coronary artery calcification is seen. *  CHEST WALL/AXILLA: Within normal limits. *  VISUALIZED UPPER ABDOMEN: Within normal limits. *  SPINE / BONES: Status post CABG. *  ADDITIONAL COMMENTS: None. IMPRESSION: No evidence of pulmonary embolism. Left lower lobe atelectasis or pneumonia with a left pleural effusion. Linear atelectasis of the right middle and right upper lobes. Dilated diffuse fluid-filled esophagus. No evidence of a hiatal hernia or obstructing mass. I question of the patient could have gastroesophageal reflux disease. Coronary artery disease status post CABG. Date of Dictation: 2/14/2020 12:25 AM 
 
Xr Chest [facility] Result Date: 2/13/2020 EXAMINATION: CHEST RADIOGRAPH 2/13/2020 3:55 PM ACCESSION NUMBER: [id number] INDICATION: sho COMPARISON: Chest x-ray 2/8/2020 TECHNIQUE: A single AP view of the chest was obtained. FINDINGS: Support Lines and Tubes: Unchanged cardiac pacemaker positioning. Unchanged left central venous catheter positioning. Cardiac Silhouette: Unchanged enlargement of the cardiac silhouette. Lungs: Unchanged left basilar opacity, moderate left pleural effusion. Improving interstitial edema. Pleura: No pneumothorax. Osseous Structures: Prior median sternotomy. Upper Abdomen: Unremarkable. IMPRESSION: 1. Improving interstitial edema. 2. Unchanged left basilar opacity and left pleural effusion.  3. Unchanged enlargement of the cardiac rogelio. VOICE DICTATED BY: Dr. Matthew Talavera Assessment and Plan:  
 
Hospital Problems as of 3/14/2020 Date Reviewed: 3/9/2020 Codes Class Noted - Resolved POA Acute on chronic respiratory failure with hypoxia [facility]) ICD-10-CM: R37.71 
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 
ICD-9-CM: 250.40, 583.81  8/26/2019 - Present Yes Obesity, morbid ([address] (Chronic) ICD-10-CM: E66.01 
ICD-9-CM: 278.01  10/19/2018 - Present Yes DM type 2 (diabetes mellitus, type 2) (HCC) (Chronic) ICD-10-CM: E11.9 ICD-9-CM: 250.00  1/14/2013 - Present Yes HLD (hyperlipidemia) (Chronic) ICD-10-CM: Y81.9 ICD-9-CM: 272.4  1/14/2013 - Present Yes RESOLVED: Acute-on-chronic kidney injury [facility] ICD-10-CM: N17.9, N18.9 ICD-9-CM: 584.9, 585.9  2/17/2020 - 2/25/2020 Yes RESOLVED: Atelectasis ICD-10-CM: J98.11 ICD-9-CM: 518.0  2/17/2020 - 2/25/2020 Yes RESOLVED: Fluid overload ICD-10-CM: E87.70 ICD-9-CM: 276.69  2/15/2020 - 2/25/2020 Yes RESOLVED: Pleural effusion ICD-10-CM: J90 ICD-9-CM: 511.9  2/3/2020 - 2/25/2020 Yes * (Principal) RESOLVED: Acute hypoxemic respiratory failure ([address] ICD-10-CM: J96.01 
ICD-9-CM: 518.81  1/10/2020 - 2/25/2020 Yes RESOLVED: HTN (hypertension) ICD-10-CM: I10 
ICD-9-CM: 401.9  1/14/2013 - 2/25/2020 Yes Plan: 
Acute hypoxemic respiratory failure with Progressive LLL consolidation - ID on board: back on abx, vanc/cefepime EOT 3/13/2020.  
- S/p bronch 2/17. Pulm following. 
  
LUE pain 
- US : no DVT but Thrombosed superficial branch of the left cephalic vein. DEJUAN now on HD 
- HD per nephro 
  
DM2 
- sliding scale 
- holding Basal/prandial insulins with sugars reasonable though occasional spikes throughout the day 
  
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 
-Hold warfarin 
-Start argatroban on INR less than 2.5 to plan for thoracentesis per pulmonology 
  
CAD S/p CABG/AVR January 2020 AFib 
- Home meds. 
  
MDD, passive suicidal ideation: 
-Patient has stated on several times \"doc, just shoot me\". Patient is voiced hopelessness and \"I just want to give up\" to nurse. - Per tele psych 
 - Decrease Buspar to 5mg TID x3 days (or 9 doses) then stop 
 - trazodone dc(suspected contributing to orthostatic hypotension?) - Start Remeron 7.5mg PO qhs; start Zoloft 25mg daily - DC Klonopin - Decrease Xanax to 0.25mg q6 prn Diet:  DIET DIABETIC CONSISTENT CARB 
DIET NUTRITIONAL SUPPLEMENTS 
DVT PPx: warfarin Code: DNR Signed By: Betzy Pham MD   
 March 14, 2020

## 2020-03-14 NOTE — PROGRESS NOTES
Renal Progress Note Admission Date: 2/13/2020 Subjective:  
 
Labs and chart reviewed ROS: 
General: no fever/ chills,  
Lung: ++ SOB 
CV: no CP 
GI: no N/V/D Ext: right wrist edema Objective:  
 
Physical Exam:   
Patient Vitals for the past 8 hrs: 
 BP Temp Pulse Resp SpO2 Weight 03/14/20 1211 123/63  84     
03/14/20 1130 129/74  85     
03/14/20 1059 (!) 127/91  90     
03/14/20 0921     93 %   
03/14/20 0846 154/83 97.8 °F (36.6 °C) 85 19 95 %   
03/14/20 0639      104 kg (229 lb 3.2 oz) Gen: mild SOB  
 
CV: regular rate, S1, S2, no Rub Lungs: decreased BS with diffuse bronchi. Abd: soft, non tender, + BS Extem: no LE edema, right wrist trace edema, left foot necrotic toes Access: Left TCC- intact Current Facility-Administered Medications Medication Dose Route Frequency  furosemide (LASIX) injection 80 mg  80 mg IntraVENous BID  loratadine (CLARITIN) tablet 10 mg  10 mg Oral DAILY PRN  
 bacitracin 500 unit/gram ointment   Topical BID  insulin lispro (HUMALOG) injection   SubCUTAneous AC&HS  
 dextrose 40% (GLUTOSE) oral gel 1 Tube  15 g Oral PRN  
 glucagon (GLUCAGEN) injection 1 mg  1 mg IntraMUSCular PRN  
 dextrose (D50W) injection syrg 12.5-25 g  25-50 mL IntraVENous PRN  
 HYDROcodone-homatropine (HYCODAN) 5-1.5 mg/5 mL (5 mL) syrup 5 mL  5 mL Oral Q4H PRN  
 benzonatate (TESSALON) capsule 100 mg  100 mg Oral TID PRN  
 guaiFENesin ER (MUCINEX) tablet 1,200 mg  1,200 mg Oral Q12H  
 albuterol (PROVENTIL VENTOLIN) nebulizer solution 2.5 mg  2.5 mg Nebulization Q6H RT  
 insulin glargine (LANTUS) injection 5 Units  5 Units SubCUTAneous QHS  mirtazapine (REMERON) tablet 7.5 mg  7.5 mg Oral QHS  sertraline (ZOLOFT) tablet 25 mg  25 mg Oral DAILY  ALPRAZolam (XANAX) tablet 0.25 mg  0.25 mg Oral Q6H PRN  
 sodium chloride (OCEAN) 0.65 % nasal squeeze bottle 2 Spray  2 Spray Both Nostrils Q2H PRN  
  morphine injection 2 mg  2 mg IntraVENous Q6H PRN  
 sodium chloride 3% hypertonic nebulizer soln  4 mL Nebulization BID RT  
 sodium chloride (OCEAN) 0.65 % nasal squeeze bottle 2 Spray  2 Spray Both Nostrils BID  epoetin maritza-epbx (RETACRIT) 14,000 Units combo injection  14,000 Units SubCUTAneous Q7D  
 polyethylene glycol (MIRALAX) packet 17 g  17 g Oral DAILY  midodrine (PROAMITINE) tablet 10 mg  10 mg Oral TID WITH MEALS  tamsulosin (FLOMAX) capsule 0.4 mg  0.4 mg Oral QHS  loperamide (IMODIUM) capsule 4 mg  4 mg Oral QID PRN  pantothenic ac-min oil-pet,hyd (AQUAPHOR) 41 % ointment   Topical DAILY  traMADol (ULTRAM) tablet 50 mg  50 mg Oral Q6H PRN  
 sodium chloride (NS) flush 5-40 mL  5-40 mL IntraVENous PRN  
 acetaminophen (TYLENOL) tablet 650 mg  650 mg Oral Q4H PRN  
 naloxone (NARCAN) injection 0.4 mg  0.4 mg IntraVENous PRN  
 ondansetron (ZOFRAN) injection 4 mg  4 mg IntraVENous Q4H PRN  
 bisacodyL (DULCOLAX) tablet 5 mg  5 mg Oral DAILY PRN Data Review:  
 
LABS:  
Recent Results (from the past 12 hour(s)) GLUCOSE, POC Collection Time: 03/14/20  5:35 AM  
Result Value Ref Range Glucose (POC) 110 (H) 65 - 100 mg/dL PROTHROMBIN TIME + INR Collection Time: 03/14/20  8:33 AM  
Result Value Ref Range Prothrombin time 36.4 (H) 12.0 - 14.7 sec INR 3.5 METABOLIC PANEL, BASIC Collection Time: 03/14/20  8:33 AM  
Result Value Ref Range Sodium 139 136 - 145 mmol/L Potassium 4.5 3.5 - 5.1 mmol/L Chloride 105 98 - 107 mmol/L  
 CO2 27 21 - 32 mmol/L Anion gap 7 7 - 16 mmol/L Glucose 112 (H) 65 - 100 mg/dL BUN 15 8 - 23 MG/DL Creatinine 2.42 (H) 0.8 - 1.5 MG/DL  
 GFR est AA 34 (L) >60 ml/min/1.73m2 GFR est non-AA 28 (L) >60 ml/min/1.73m2 Calcium 8.7 8.3 - 10.4 MG/DL Plan: Active Problems: 
  DM type [facility] (1/14/2013) HLD (hyperlipidemia) (1/14/2013) Obesity, morbid ([facility] (10/19/2018) Type 2 diabetes with nephropathy ([address] (8/26/2019) CAD (coronary artery disease) (1/10/2020) S/P CABG x 3 (1/10/2020) S/P AVR (1/10/2020) Atrial fibrillation ([address] (1/13/2020) HIT (heparin-induced thrombocytopenia) ([facility] (1/23/2020) Pneumonia due to infectious organism (2/14/2020) Hypotension (2/18/2020) Acute renal failure on dialysis ([address] (2/25/2020) DNR (do not resuscitate) (2/26/2020) Acute on chronic respiratory failure with hypoxia ([facility] (3/8/2020) DEJUAN/CKD 
- ([place] TTS schedule) 
-no sign of renal recovery, Seen again today on HD for further UF of 4 kg. if no improvement of SOB  And [address] Effusion: tap. 
  
HIT/SHARON + 
  
ASHD s/p CABG 
  
Orthostatic Hypotension - on midodrine 
  
 Anemia - s/p IV Fe replacement and increase shelton Acute respiratory failure,- albuterol and CPAP

## 2020-03-14 NOTE — PROGRESS NOTES
Informed Dr Geraldo Zamora that patient would like to discuss options for palliative/hospice after he talks with his son. wants to discuss with his son first. States he \"is very tired, wanting to quit. States he \"isn't going to get better\". Dr ordered psych consult. When questioned in why at the request of patient. Dr states Wydenita Plata is hopeless and depressed\" Updated patient in regards.

## 2020-03-15 NOTE — PROGRESS NOTES
Warfarin dosing per pharmacist 
 
Ab Correa is a 69 y.o. male. Height: 5' 10\" (177.8 cm)   Weight 103.5 kg (228 lbs) Indication:  AVR and afib Goal INR:  2.5 - 3.5 Home dose:  2.5 mg daily Risk factors or significant drug interactions: HIT+(SHARON positive on 1/18/20), amiodarone (dc'd 2/5), Levofloxacin (2/13- 2/20 ), mirtazapine (2/13-2/24), escitalopram (2/19-2/20), trazodone (started 2/24) Other anticoagulants:  N/A Daily Monitoring Date  INR     Warfarin dose HGB              Notes 2/14  2.4  3 mg  8.7 2/15  2.2  4 mg  9.6 2/16  2.1  5 mg  9.8 2/17  2.1  5 mg  9.5 2/18  2.8  2 mg  9.3 2/19  2.5  2 mg  8.7 
2/20  1.9  3 mg   8.8 
2/21  1.9  4 mg  8.6 
2/22  2.1  3 mg  8.5 
2/23  1.9  4 mg  --- 
2/24  1.7  3 mg + 2 mg --- 
2/25  1.7  5 mg  --- 
2/26  1.6  6 mg  --- 
2/27  1.7  6 mg   --- 
2/28  1.7  7.5 mg  --- 
2/29  2.1 ` 6 mg  7.7 
3/1  2.0  7.5 mg  --- 
3/2  2.9  6 mg  --- 
3/3  3.4  5 mg  8.0 
3/4  2.7  7.5mg  8.6 
3/5  2.9  6 mg  8.0 
3/6  3.1  Held  8.4 
3/7  2.6  6 mg  8.0 
3/8  2.7  6mg  --- 
3/9  2.5  6 mg  --- 
3/10  2.7  6 mg  7.6 
3/11  3.3  5 mg  8.3 
3/12  4.3  Hold  8.2 
3/13  3.9  Hold  8.8 
3/14  3.5  Hold  --- 
3/15  2.8  Hold  --- 
 
 
 
Pulmonary considering thoracentesis next week. Continue to hold warfarin and let INR drift down. When INR drops to 2.5, patient will need to be started on argatroban bridge(HIT +). Daily INR. Thank you, Antonio Gonzalez, PharmD, BCPS Clinical Pharmacist 
[phone]

## 2020-03-15 NOTE — PROGRESS NOTES
Received bedside shift report from off going nurse, Ivet Jack. Patient resting in bed quietly. Respirations even and unlabored on 4 L via nasal cannula. Bed low and locked. Bedside table, personal belongings, and call light all within reach.

## 2020-03-15 NOTE — PROGRESS NOTES
Preparing to report to oncoming shift nurse. Pt is stable, resting in bed, breathing unlabored on 3L NC.  safety measures remain intact.

## 2020-03-15 NOTE — PROGRESS NOTES
Renal Progress Note Admission Date: 2/13/2020 Subjective:  
 
Labs and chart reviewed ROS: 
General: no fever/ chills,  
Lung: ++ SOB 
CV: no CP 
GI: no N/V/D Ext: right wrist edema Objective:  
 
Physical Exam:   
Patient Vitals for the past 8 hrs: 
 BP Temp Pulse Resp SpO2 Weight 03/15/20 1101 112/70 97.9 °F (36.6 °C) 88 20 92 %   
03/15/20 0718 135/76 98.2 °F (36.8 °C) 88 19 96 %   
03/15/20 0640      104 kg (229 lb 3.2 oz) Gen: mild SOB  
 
CV: regular rate, S1, S2, no Rub Lungs: decreased BS with diffuse bronchi. Abd: soft, non tender, + BS Extem: no LE edema, right wrist trace edema, left foot necrotic toes Access: Left TCC- intact Current Facility-Administered Medications Medication Dose Route Frequency  diphenhydrAMINE (BENADRYL) capsule 25 mg  25 mg Oral Q6H PRN  
 WARFARIN ON HOLD    Other Rx Dosing/Monitoring  sertraline (ZOLOFT) tablet 50 mg  50 mg Oral DAILY  mirtazapine (REMERON) tablet 15 mg  15 mg Oral QHS  ALPRAZolam (XANAX) tablet 0.5 mg  0.5 mg Oral Q6H PRN  
 furosemide (LASIX) injection 80 mg  80 mg IntraVENous BID  loratadine (CLARITIN) tablet 10 mg  10 mg Oral DAILY PRN  
 bacitracin 500 unit/gram ointment   Topical BID  insulin lispro (HUMALOG) injection   SubCUTAneous AC&HS  
 dextrose 40% (GLUTOSE) oral gel 1 Tube  15 g Oral PRN  
 glucagon (GLUCAGEN) injection 1 mg  1 mg IntraMUSCular PRN  
 dextrose (D50W) injection syrg 12.5-25 g  25-50 mL IntraVENous PRN  
 HYDROcodone-homatropine (HYCODAN) 5-1.5 mg/5 mL (5 mL) syrup 5 mL  5 mL Oral Q4H PRN  
 benzonatate (TESSALON) capsule 100 mg  100 mg Oral TID PRN  
 guaiFENesin ER (MUCINEX) tablet 1,200 mg  1,200 mg Oral Q12H  
 albuterol (PROVENTIL VENTOLIN) nebulizer solution 2.5 mg  2.5 mg Nebulization Q6H RT  
 insulin glargine (LANTUS) injection 5 Units  5 Units SubCUTAneous QHS  sodium chloride (OCEAN) 0.65 % nasal squeeze bottle 2 Spray  2 Spray Both Nostrils Q2H PRN  
  morphine injection 2 mg  2 mg IntraVENous Q6H PRN  
 sodium chloride 3% hypertonic nebulizer soln  4 mL Nebulization BID RT  
 sodium chloride (OCEAN) 0.65 % nasal squeeze bottle 2 Spray  2 Spray Both Nostrils BID  epoetin maritza-epbx (RETACRIT) 14,000 Units combo injection  14,000 Units SubCUTAneous Q7D  
 polyethylene glycol (MIRALAX) packet 17 g  17 g Oral DAILY  midodrine (PROAMITINE) tablet 10 mg  10 mg Oral TID WITH MEALS  tamsulosin (FLOMAX) capsule 0.4 mg  0.4 mg Oral QHS  loperamide (IMODIUM) capsule 4 mg  4 mg Oral QID PRN  pantothenic ac-min oil-pet,hyd (AQUAPHOR) 41 % ointment   Topical DAILY  traMADol (ULTRAM) tablet 50 mg  50 mg Oral Q6H PRN  
 sodium chloride (NS) flush 5-40 mL  5-40 mL IntraVENous PRN  
 acetaminophen (TYLENOL) tablet 650 mg  650 mg Oral Q4H PRN  
 naloxone (NARCAN) injection 0.4 mg  0.4 mg IntraVENous PRN  
 ondansetron (ZOFRAN) injection 4 mg  4 mg IntraVENous Q4H PRN  
 bisacodyL (DULCOLAX) tablet 5 mg  5 mg Oral DAILY PRN Data Review:  
 
LABS:  
Recent Results (from the past 12 hour(s)) GLUCOSE, POC Collection Time: 03/15/20  5:49 AM  
Result Value Ref Range Glucose (POC) 164 (H) 65 - 100 mg/dL PROTHROMBIN TIME + INR Collection Time: 03/15/20  6:22 AM  
Result Value Ref Range Prothrombin time 30.1 (H) 12.0 - 14.7 sec INR 2.8 METABOLIC PANEL, BASIC Collection Time: 03/15/20  6:22 AM  
Result Value Ref Range Sodium 136 136 - 145 mmol/L Potassium 3.8 3.5 - 5.1 mmol/L Chloride 101 98 - 107 mmol/L  
 CO2 29 21 - 32 mmol/L Anion gap 6 (L) 7 - 16 mmol/L Glucose 139 (H) 65 - 100 mg/dL BUN 15 8 - 23 MG/DL Creatinine 2.81 (H) 0.8 - 1.5 MG/DL  
 GFR est AA 29 (L) >60 ml/min/1.73m2 GFR est non-AA 24 (L) >60 ml/min/1.73m2 Calcium 8.8 8.3 - 10.4 MG/DL  
GLUCOSE, POC Collection Time: 03/15/20 11:04 AM  
Result Value Ref Range Glucose (POC) 216 (H) 65 - 100 mg/dL Plan: Active Problems: DM type 2 (diabetes mellitus, type 2) ([facility] [address] (1/14/2013) HLD (hyperlipidemia) (1/14/2013) Obesity, morbid ([address] (10/19/2018) Type 2 diabetes with nephropathy ([address] (8/26/2019) CAD (coronary artery disease) (1/10/2020) S/P CABG x 3 (1/10/2020) S/P AVR (1/10/2020) Atrial fibrillation ([address] (1/13/2020) HIT (heparin-induced thrombocytopenia) ([facility] [address] (1/23/2020) Pneumonia due to infectious organism (2/14/2020) Hypotension (2/18/2020) Acute renal failure on dialysis ([facility] [address] (2/25/2020) DNR (do not resuscitate) (2/26/2020) Acute on chronic respiratory failure with hypoxia ([facility] [address] (3/8/2020) DEJUAN/CKD 
- ([place] TTS schedule) 
-no sign of renal recovery, SOB: little improvement with repeated UF. L. Pl. Effusion: for  Tap. HD next week  
  
HIT/SHARON + 
  
ASHD s/p CABG 
  
Orthostatic Hypotension - on midodrine 
  
 Anemia - s/p IV Fe replacement and increase shelton Acute respiratory failure,- albuterol and CPAP

## 2020-03-15 NOTE — PROGRESS NOTES
Vanessa Mcelroy. Informed Patient woke up complaining of itching in both arms and legs. patient is scratching and causing abrasions. Please advise.

## 2020-03-15 NOTE — PROGRESS NOTES
To Mcintyre. Informed Just received Telepsych recommendations for patient. They suggest hospice interventions- which is not ordered. They also want to increase patient Zoloft to 50 mg po am, increase his Remeron to 15 mg po qhs- which I only gave 7.5 mg tonight, and to increase his Xanax to 0.5 mg. Can we increase these medications, because he is still having high anxiety and is requesting his oxygen to be turned up despite oxygen saturation 95% on CPAP. Per Dr. Graham Mcintyre okay to place all recommended orders.

## 2020-03-15 NOTE — PROGRESS NOTES
Hospitalist Note Admit Date:  2020  3:03 PM  
Name:  Rosaura Macario Age:  69 y.o. 
:  1945 MRN:  [id number] PCP:  Sarah Valentine MD 
Treatment Team: Attending Provider: Snehal Leahy MD; Consulting Provider: Deshawn Delgado MD; Utilization Review: Daren Coleman RN; Hospitalist: Sindy Pugh NP; Hospitalist: Harrison Pinzon NP; Consulting Provider: Deborah Yin MD; Consulting Provider: Preethi Abreu MD; Care Manager: Carrol Song.; Utilization Review: Marbella Kirkland; Consulting Provider: Verdis Mortimer, MD; Consulting Provider: Vicente Malcolm MD 
 
HPI/Subjective:  
78-year-old male admitted with acute hypoxemic respiratory failure and progressive left lower lobe consolidation. Infectious disease and pulmonology are following and applied the patient on vancomycin and cefepime for failure of levofloxacin therapy as an outpatient. Patient had a bronchoscopy on  per pulmonology and that final result is pending. Patient recently underwent CABG and aortic valve repair in 2020 and had resultant atrial fibrillation. Post CABG patient developed renal failure and is requiring hemodialysis for renal clearance. 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 Patient diagnosed with pneumonia post CABG and discharged home with levofloxacin. Pulmonology feel the patient will likely need to have a thoracentesis in the near future therefore have coordinated with pharmacy to have the patient started on argatroban once his INR gets to the low end of 2. Patient was previously on argatroban at 0.5 mcg/kg/min and this will likely be the best starting dose for him. Pending placement to LTAC. Today: Patient continues to be significantly depressed and asked me today if I could facilitate physician assisted suicide. Psychiatry evaluated the patient yesterday.   INR remains above 2.5 therefore will continue to hold warfarin and not initiate argatroban. Patient continues to have poor signs of renal recovery per nephrology. Pending placement to LTAC per case management. Objective:  
 
Patient Vitals for the past 24 hrs: 
 Temp Pulse Resp BP SpO2  
03/15/20 1338     95 % 03/15/20 1101 97.9 °F (36.6 °C) 88 20 112/70 92 % 03/15/20 0718 98.2 °F (36.8 °C) 88 19 135/76 96 % 03/15/20 0223 97.8 °F (36.6 °C) 83 18 142/68 94 % 03/15/20 0205     96 % 03/14/20 2242 97.5 °F (36.4 °C) 82 18 133/69 97 % 03/14/20 2025     97 % 03/14/20 2019     97 % 03/14/20 1928 98 °F (36.7 °C) 84 17 124/70 96 % Oxygen Therapy O2 Sat (%): 95 % (03/15/20 1338) Pulse via Oximetry: 87 beats per minute (03/15/20 1338) O2 Device: Nasal cannula (03/15/20 1505) PEEP/CPAP (cm H2O): 3 cm H20 (03/15/20 1338) O2 Flow Rate (L/min): 3 l/min (03/15/20 1505) O2 Temperature: 87.8 °F (31 °C) (03/10/20 0618) FIO2 (%): 35 % (03/15/20 0205) Estimated body mass index is 32.89 kg/m² as calculated from the following: 
  Height as of this encounter: 5' 10\" (1.778 m). Weight as of this encounter: 104 kg (229 lb 3.2 oz). Intake/Output Summary (Last 24 hours) at 3/15/2020 1608 Last data filed at 3/15/2020 1430 Gross per 24 hour Intake 360 ml Output 100 ml Net 260 ml  
   
*Note that automatically entered I/Os may not be accurate; dependent on patient compliance with collection and accurate  by techs. Physical Exam:  
General:     alert, awake, no acute distress. Obese. Head:   normocephalic, atraumatic Eyes, Ears, nose: PERRL, EOMI. NC Neck:    supple, non-tender Lungs:   Wheezing Cardiac:   RRR, Normal S1 and S2. Abdomen:   Soft, non distended, nontender, +BS Extremities:   Warm, dry. B/l stasis dermatitis with mild edema. Left forearm and hand swelling Skin:   No rashes, no jaundice Neuro:  Alert and oriented to person, time, place, situation. No gross focal neurological deficit Psychiatric:  No anxiety, calm, cooperative Data Review: 
I have reviewed all labs, meds, and studies from the last 24 hours: 
 
Recent Results (from the past 24 hour(s)) GLUCOSE, POC Collection Time: 03/14/20  8:21 PM  
Result Value Ref Range Glucose (POC) 191 (H) 65 - 100 mg/dL GLUCOSE, POC Collection Time: 03/15/20  5:49 AM  
Result Value Ref Range Glucose (POC) 164 (H) 65 - 100 mg/dL PROTHROMBIN TIME + INR Collection Time: 03/15/20  6:22 AM  
Result Value Ref Range Prothrombin time 30.1 (H) 12.0 - 14.7 sec INR 2.8 METABOLIC PANEL, BASIC Collection Time: 03/15/20  6:22 AM  
Result Value Ref Range Sodium 136 136 - 145 mmol/L Potassium 3.8 3.5 - 5.1 mmol/L Chloride 101 98 - 107 mmol/L  
 CO2 29 21 - 32 mmol/L Anion gap 6 (L) 7 - 16 mmol/L Glucose 139 (H) 65 - 100 mg/dL BUN 15 8 - 23 MG/DL Creatinine 2.81 (H) 0.8 - 1.5 MG/DL  
 GFR est AA 29 (L) >60 ml/min/1.73m2 GFR est non-AA 24 (L) >60 ml/min/1.73m2 Calcium 8.8 8.3 - 10.4 MG/DL  
GLUCOSE, POC Collection Time: 03/15/20 11:04 AM  
Result Value Ref Range Glucose (POC) 216 (H) 65 - 100 mg/dL All Micro Results Procedure Component Value Units Date/Time CULTURE, RESPIRATORY/SPUTUM/BRONCH Cary Bracket STAIN [[id number]]  (Abnormal) Collected:  03/09/20 2970 Order Status:  Completed Specimen:  Sputum Updated:  03/12/20 0141 Special Requests: NO SPECIAL REQUESTS     
  GRAM STAIN 10 TO 50 WBC'S/OIF  
   0 TO 2 EPITHELIAL CELLS/OIF  
   FEW GRAM POSITIVE RODS MODERATE YEAST 4+ MUCUS PRESENT Culture result: MODERATE CANDIDA ALBICANS     
 CULTURE, BLOOD [[id number]] Collected:  03/06/20 0944 Order Status:  Completed Specimen:  Blood Updated:  03/11/20 2340 Special Requests: --     
  RIGHT Antecubital 
  
  Culture result: NO GROWTH 5 DAYS     
 CULTURE, BLOOD [[id number]] Collected:  03/06/20 2112 Order Status:  Completed Specimen:  Blood Updated:  03/11/20 6106 Special Requests: --     
  LEFT Antecubital 
  
  Culture result: NO GROWTH 5 DAYS     
 CULTURE, RESPIRATORY/SPUTUM/BRONCH Willistine Fam STAIN [[id number]] Collected:  03/06/20 1600 Order Status:  Canceled Specimen:  Sputum C. DIFFICILE AG & TOXIN A/B [[id number]] Order Status:  Canceled Specimen:  Stool CULTURE, RESPIRATORY/SPUTUM/BRONCH aNhum Medrano [[id number]] Collected:  02/17/20 1220 Order Status:  Completed Specimen:  Sputum from Bronchial Washing Updated:  02/24/20 1244 Special Requests: NO SPECIAL REQUESTS     
  GRAM STAIN 20 TO 35 WBCS SEEN PER OIF  
   1 TO 2 EPITHELIAL CELLS SEEN PER OIF  
      
  MODERATE GRAM POSITIVE COCCI  
     
   FEW GRAM POSITIVE RODS Culture result:    
  LIGHT NORMAL RESPIRATORY BRENNAN Current Meds: 
Current Facility-Administered Medications Medication Dose Route Frequency  diphenhydrAMINE (BENADRYL) capsule 25 mg  25 mg Oral Q6H PRN  
 WARFARIN ON HOLD    Other Rx Dosing/Monitoring  sertraline (ZOLOFT) tablet 50 mg  50 mg Oral DAILY  mirtazapine (REMERON) tablet 15 mg  15 mg Oral QHS  ALPRAZolam (XANAX) tablet 0.5 mg  0.5 mg Oral Q6H PRN  
 furosemide (LASIX) injection 80 mg  80 mg IntraVENous BID  loratadine (CLARITIN) tablet 10 mg  10 mg Oral DAILY PRN  
 bacitracin 500 unit/gram ointment   Topical BID  insulin lispro (HUMALOG) injection   SubCUTAneous AC&HS  
 dextrose 40% (GLUTOSE) oral gel 1 Tube  15 g Oral PRN  
 glucagon (GLUCAGEN) injection 1 mg  1 mg IntraMUSCular PRN  
 dextrose (D50W) injection syrg 12.5-25 g  25-50 mL IntraVENous PRN  
 HYDROcodone-homatropine (HYCODAN) 5-1.5 mg/5 mL (5 mL) syrup 5 mL  5 mL Oral Q4H PRN  
 benzonatate (TESSALON) capsule 100 mg  100 mg Oral TID PRN  
 guaiFENesin ER (MUCINEX) tablet 1,200 mg  1,200 mg Oral Q12H  
 albuterol (PROVENTIL VENTOLIN) nebulizer solution 2.5 mg  2.5 mg Nebulization Q6H RT  
 insulin glargine (LANTUS) injection 5 Units  5 Units SubCUTAneous QHS  sodium chloride (OCEAN) 0.65 % nasal squeeze bottle 2 Spray  2 Spray Both Nostrils Q2H PRN  
 morphine injection 2 mg  2 mg IntraVENous Q6H PRN  
 sodium chloride 3% hypertonic nebulizer soln  4 mL Nebulization BID RT  
 sodium chloride (OCEAN) 0.65 % nasal squeeze bottle 2 Spray  2 Spray Both Nostrils BID  epoetin maritza-epbx (RETACRIT) 14,000 Units combo injection  14,000 Units SubCUTAneous Q7D  
 polyethylene glycol (MIRALAX) packet 17 g  17 g Oral DAILY  midodrine (PROAMITINE) tablet 10 mg  10 mg Oral TID WITH MEALS  tamsulosin (FLOMAX) capsule 0.4 mg  0.4 mg Oral QHS  loperamide (IMODIUM) capsule 4 mg  4 mg Oral QID PRN  pantothenic ac-min oil-pet,hyd (AQUAPHOR) 41 % ointment   Topical DAILY  traMADol (ULTRAM) tablet 50 mg  50 mg Oral Q6H PRN  
 sodium chloride (NS) flush 5-40 mL  5-40 mL IntraVENous PRN  
 acetaminophen (TYLENOL) tablet 650 mg  650 mg Oral Q4H PRN  
 naloxone (NARCAN) injection 0.4 mg  0.4 mg IntraVENous PRN  
 ondansetron (ZOFRAN) injection 4 mg  4 mg IntraVENous Q4H PRN  
 bisacodyL (DULCOLAX) tablet 5 mg  5 mg Oral DAILY PRN Other Studies: 
 
Ct Chest W Cont Result Date: 2/14/2020 CTA OF THE CHEST - PE STUDY HISTORY: Acute shortness of breath COMPARISON: None TECHNIQUE: A helical acquisition was performed through the chest utilizing 6.45QG slice thickness during the infusion of 100 cc of Isovue-370. 3-D post-processed images were created on an independent workstation. Multiplanar reformats were obtained. The exam was focused on the pulmonary arteries. Dose reduction techniques used: Automated exposure control, adjustment of the mAs and/or kVp according to patient's size, and iterative reconstruction techniques. FINDINGS: *  PULMONARY VESSELS: No evidence of pulmonary embolism.  *  PLEURA / PERICARDIUM: Left pleural effusion. *  CAROL / MEDIASTINUM: Dilated diffuse fluid-filled esophagus. No evidence of a hiatal hernia. *  LUNGS: Consolidation of the entire left lower lobe. Linear atelectasis of the right middle and right upper lobe. *  TRACHEOBRONCHIAL TREE: Within normal limits. *  AORTA: Within normal limits. *  CORONARY ARTERIES: Extensive coronary artery calcification is seen. *  CHEST WALL/AXILLA: Within normal limits. *  VISUALIZED UPPER ABDOMEN: Within normal limits. *  SPINE / BONES: Status post CABG. *  ADDITIONAL COMMENTS: None. IMPRESSION: No evidence of pulmonary embolism. Left lower lobe atelectasis or pneumonia with a left pleural effusion. Linear atelectasis of the right middle and right upper lobes. Dilated diffuse fluid-filled esophagus. No evidence of a hiatal hernia or obstructing mass. I question of the patient could have gastroesophageal reflux disease. Coronary artery disease status post CABG. Date of Dictation: 2/14/2020 12:25 AM 
 
Xr Chest [place] Result Date: 2/13/2020 EXAMINATION: CHEST RADIOGRAPH 2/13/2020 3:55 PM ACCESSION NUMBER: [id number] INDICATION: shob COMPARISON: Chest x-ray 2/8/2020 TECHNIQUE: A single AP view of the chest was obtained. FINDINGS: Support Lines and Tubes: Unchanged cardiac pacemaker positioning. Unchanged left central venous catheter positioning. Cardiac Silhouette: Unchanged enlargement of the cardiac silhouette. Lungs: Unchanged left basilar opacity, moderate left pleural effusion. Improving interstitial edema. Pleura: No pneumothorax. Osseous Structures: Prior median sternotomy. Upper Abdomen: Unremarkable. IMPRESSION: 1. Improving interstitial edema. 2. Unchanged left basilar opacity and left pleural effusion. 3. Unchanged enlargement of the cardiac silhouette. VOICE DICTATED BY: Dr. Jeramie Drummond Assessment and Plan:  
 
Hospital Problems as of 3/15/2020 Date Reviewed: 3/9/2020 Codes Class Noted - Resolved POA Acute on chronic respiratory failure with hypoxia [facility]) ICD-10-CM: A75.59 
ICD-9-CM: 518.84, 799.02  3/8/2020 - Present Unknown DNR (do not resuscitate) (Chronic) ICD-10-CM: Z45 ICD-9-CM: V49.86  2/26/2020 - Present Yes Acute renal failure on dialysis [facility]) ICD-10-CM: N17.9, Z99.2 ICD-9-CM: 584.9, V45.11  2/25/2020 - Present Yes Hypotension (Chronic) ICD-10-CM: I95.9 ICD-9-CM: 458.9  2/18/2020 - Present Yes Pneumonia due to infectious organism ICD-10-CM: J18.9 ICD-9-CM: 136.9, 484.8  2/14/2020 - Present Unknown HIT (heparin-induced thrombocytopenia) (HCC) (Chronic) ICD-10-CM: F79.05 ICD-9-CM: 289.84  1/23/2020 - Present Yes Atrial fibrillation (HCC) (Chronic) ICD-10-CM: I48.91 
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 
ICD-9-CM: 250.40, 583.81  8/26/2019 - Present Yes Obesity, morbid ([address] (Chronic) ICD-10-CM: E66.01 
ICD-9-CM: 278.01  10/19/2018 - Present Yes DM type 2 (diabetes mellitus, type 2) (HCC) (Chronic) ICD-10-CM: E11.9 ICD-9-CM: 250.00  1/14/2013 - Present Yes HLD (hyperlipidemia) (Chronic) ICD-10-CM: F39.2 ICD-9-CM: 272.4  1/14/2013 - Present Yes RESOLVED: Acute-on-chronic kidney injury ([facility] [address] ICD-10-CM: N17.9, N18.9 ICD-9-CM: 584.9, 585.9  2/17/2020 - 2/25/2020 Yes RESOLVED: Atelectasis ICD-10-CM: J98.11 ICD-9-CM: 518.0  2/17/2020 - 2/25/2020 Yes RESOLVED: Fluid overload ICD-10-CM: E87.70 ICD-9-CM: 276.69  2/15/2020 - 2/25/2020 Yes RESOLVED: Pleural effusion ICD-10-CM: J90 ICD-9-CM: 511.9  2/3/2020 - 2/25/2020 Yes * (Principal) RESOLVED: Acute hypoxemic respiratory failure ([facility] [address] ICD-10-CM: J96.01 
ICD-9-CM: 518.81  1/10/2020 - 2/25/2020 Yes RESOLVED: HTN (hypertension) ICD-10-CM: I10 
ICD-9-CM: 401.9  1/14/2013 - 2/25/2020 Yes Plan: 
Acute hypoxemic respiratory failure with Progressive LLL consolidation - ID on board: back on abx, vanc/cefepime EOT 3/13/2020.  
- S/p bronch 2/17. Pulm following. 
  
LUE pain 
- US : no DVT but Thrombosed superficial branch of the left cephalic vein. DEJUAN now on HD 
- HD per nephro 
  
DM2 
- sliding scale 
- holding Basal/prandial insulins with sugars reasonable though occasional spikes throughout the day 
  
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 
-Hold warfarin 
-Start argatroban on INR less than 2.5 to plan for thoracentesis per pulmonology 
  
CAD S/p CABG/AVR January 2020 AFib 
- Home meds. 
  
MDD, passive suicidal ideation: 
-Patient has stated on several times \"doc, just shoot me\". Patient is voiced hopelessness and \"I just want to give up\" to nurse. - Per tele psych 
 - Decrease Buspar to 5mg TID x3 days (or 9 doses) then stop 
 - trazodone dc(suspected contributing to orthostatic hypotension?) - Start Remeron 7.5mg PO qhs; start Zoloft 25mg daily - DC Klonopin - Decrease Xanax to 0.25mg q6 prn Diet:  DIET DIABETIC CONSISTENT CARB 
DIET NUTRITIONAL SUPPLEMENTS 
DVT PPx: warfarin Code: DNR Signed By: Kartik Baig MD   
 March 15, 2020

## 2020-03-15 NOTE — PROGRESS NOTES
Pt c/o severe generalized itching. Nurse found pt scratching with a fork. Found abrasions and excoriations all over body. No rash noted. Pt reports benadryl as ineffective. aquaphor and bacitracin applied. Pt now resting with eyes closed.

## 2020-03-15 NOTE — PROGRESS NOTES
Pt placed on cpap at this time. FFM. No complications noted. Unit plugged in red outlet. 03/14/20 2025 Oxygen Therapy O2 Sat (%) 97 % Pulse via Oximetry 84 beats per minute O2 Device CPAP mask FIO2 (%) 35 % Respiratory Respiratory (WDL) X Respiratory Pattern Regular Chest/Tracheal Assessment Chest expansion, symmetrical  
Breath Sounds Bilateral Clear Cough Non-productive CPAP/BIPAP  
CPAP/BIPAP Start/Stop On Device Mode CPAP Mask Type and Size Full face Skin Condition intact (no redness) EPAP (cm H2O) 8 cm H2O Vt Spont (ml) 370 ml Ve Observed (l/min) 10 l/min Total RR (Spontaneous) 28 breaths per minute Leak (Estimated) 30 L/min  
Pt's Home Machine No  
Biomedical Check Performed Yes Alarm Settings High Pressure 30 Low Pressure 2 Apnea 20 Low Ve 3 High Rate 50 Low Rate 10 Pulmonary Toilet Pulmonary Toilet H. O.B elevated

## 2020-03-16 NOTE — PROGRESS NOTES
Completed bedside shift report with oncoming nurse, Francia Yo RN. Patient resting in bed quietly. Respirations even and unlabored on 3 L via nasal cannula. Bed low and locked. Bedside table, personal belongings and call light all within reach.

## 2020-03-16 NOTE — PROGRESS NOTES
03/15/20 2208 Oxygen Therapy O2 Sat (%) 95 % Pulse via Oximetry 82 beats per minute O2 Device CPAP mask FIO2 (%) 35 % Respiratory Respiratory (WDL) WDL Respiratory Pattern Regular Chest/Tracheal Assessment Chest expansion, symmetrical  
Breath Sounds Bilateral Clear;Diminished CPAP/BIPAP  
CPAP/BIPAP Start/Stop On Device Mode CPAP  
$$ Bipap Daily Yes Mask Type and Size Full face Skin Condition intact PIP Observed 9 cm H20  
EPAP (cm H2O) 8 cm H2O Vt Spont (ml) 322 ml Ve Observed (l/min) 9.7 l/min Backup Rate 10 Total RR (Spontaneous) 27 breaths per minute Insp Rise Time (sec) 2 Leak (Estimated) 46 L/min  
Pt's Home Machine No  
Biomedical Check Performed Yes Settings Verified Yes Alarm Settings High Pressure 30 Low Pressure 2 Apnea 20 Low Ve 3 High Rate 50 Low Rate 10 Pulmonary Toilet Pulmonary Toilet H. O.B elevated

## 2020-03-16 NOTE — PROGRESS NOTES
Hospitalist Note Admit Date:  2020  3:03 PM  
Name:  Beth Christianson Age:  69 y.o. 
:  1945 MRN:  [id number] PCP:  Mara Wilson MD 
Treatment Team: Attending Provider: Kerrie Boyd MD; Consulting Provider: Qamar Reid MD; Utilization Review: Nnamdi Olmedo RN; Hospitalist: Moni Raines NP; Hospitalist: Jd Rosado NP; Consulting Provider: Deborah Yin MD; Consulting Provider: Katerina Bustillo MD; Care Manager: Luis Alberto Monk; Utilization Review: Robbi Dawn; Consulting Provider: Sandra Welch MD; Consulting Provider: Julia Fulton MD; Consulting Provider: Kerrie Boyd MD; Occupational Therapist: Yariel Falcon HPI/Subjective:  
72-year-old male admitted with acute hypoxemic respiratory failure and progressive left lower lobe consolidation. Infectious disease and pulmonology are following and applied the patient on vancomycin and cefepime for failure of levofloxacin therapy as an outpatient. Patient had a bronchoscopy on 217 per pulmonology and that final result is pending. Patient recently underwent CABG and aortic valve repair in 2020 and had resultant atrial fibrillation. Post CABG patient developed renal failure and is requiring hemodialysis for renal clearance. 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 Patient diagnosed with pneumonia post CABG and discharged home with levofloxacin. Pulmonology feel the patient will likely need to have a thoracentesis in the near future therefore have coordinated with pharmacy to have the patient started on argatroban once his INR gets to the low end of 2. Patient was previously on argatroban at 0.5 mcg/kg/min and this will likely be the best starting dose for him. Pending placement to LTAC. Patient continues to be significantly depressed and asked me today if I could facilitate physician assisted suicide. Today:  Psychiatry re-evaluated the patient and recommend against involuntary commitment. INR 2.5, start argatroban. Pulmonology plan for potential thoracentesis in the morning as well as initiating by prolonged therapy. Nephrology plan for hemodialysis in the morning. Objective:  
 
Patient Vitals for the past 24 hrs: 
 Temp Pulse Resp BP SpO2  
03/16/20 1526     94 % 03/16/20 1202 97.4 °F (36.3 °C) 82 18 101/58 93 % 03/16/20 0743 98 °F (36.7 °C) 83 18 126/68 98 % 03/16/20 0418     93 % 03/16/20 0400 98.1 °F (36.7 °C) 80 19 156/69 96 % 03/16/20 0157     97 % 03/16/20 0155     97 % 03/16/20 0040     97 % 03/16/20 0001 98.4 °F (36.9 °C) 82 20 124/59 97 % 03/15/20 2208     95 % 03/15/20 1954     93 % 03/15/20 1939 98 °F (36.7 °C) 82 20 149/82 93 % Oxygen Therapy O2 Sat (%): 94 % (03/16/20 1526) Pulse via Oximetry: 85 beats per minute (03/16/20 1526) O2 Device: Hi flow nasal cannula (03/16/20 1526) PEEP/CPAP (cm H2O): 3 cm H20 (03/15/20 1338) O2 Flow Rate (L/min): 3 l/min (03/16/20 1526) O2 Temperature: 87.8 °F (31 °C) (03/10/20 0618) FIO2 (%): 35 % (03/16/20 0157) Estimated body mass index is 31.6 kg/m² as calculated from the following: 
  Height as of this encounter: 5' 10\" (1.778 m). Weight as of this encounter: 99.9 kg (220 lb 3.2 oz). Intake/Output Summary (Last 24 hours) at 3/16/2020 1647 Last data filed at 3/16/2020 1521 Gross per 24 hour Intake 700 ml Output 610 ml Net 90 ml *Note that automatically entered I/Os may not be accurate; dependent on patient compliance with collection and accurate  by [place]. Physical Exam:  
General:     alert, awake, no acute distress. Obese. Head:   normocephalic, atraumatic Eyes, Ears, nose: PERRL, EOMI. NC Neck:    supple, non-tender Lungs:   Wheezing Cardiac:   RRR, Normal S1 and S2. Abdomen:   Soft, non distended, nontender, +BS Extremities:   Warm, dry. B/l stasis dermatitis with mild edema. Left forearm and hand swelling Skin:   No rashes, no jaundice Neuro:  Alert and oriented to person, time, place, situation. No gross focal neurological deficit Psychiatric:  No anxiety, calm, cooperative Data Review: 
I have reviewed all labs, meds, and studies from the last 24 hours: 
 
Recent Results (from the past 24 hour(s)) GLUCOSE, POC Collection Time: 03/15/20  8:28 PM  
Result Value Ref Range Glucose (POC) 243 (H) 65 - 100 mg/dL GLUCOSE, POC Collection Time: 03/16/20  5:56 AM  
Result Value Ref Range Glucose (POC) 199 (H) 65 - 100 mg/dL PROTHROMBIN TIME + INR Collection Time: 03/16/20  7:04 AM  
Result Value Ref Range Prothrombin time 27.6 (H) 12.0 - 14.7 sec INR 2.5 METABOLIC PANEL, BASIC Collection Time: 03/16/20  7:04 AM  
Result Value Ref Range Sodium 134 (L) 136 - 145 mmol/L Potassium 4.0 3.5 - 5.1 mmol/L Chloride 100 98 - 107 mmol/L  
 CO2 28 21 - 32 mmol/L Anion gap 6 (L) 7 - 16 mmol/L Glucose 195 (H) 65 - 100 mg/dL BUN 29 (H) 8 - 23 MG/DL Creatinine 4.27 (H) 0.8 - 1.5 MG/DL  
 GFR est AA 18 (L) >60 ml/min/1.73m2 GFR est non-AA 15 (L) >60 ml/min/1.73m2 Calcium 8.3 8.3 - 10.4 MG/DL  
PTT Collection Time: 03/16/20 11:34 AM  
Result Value Ref Range aPTT 29.5 24.3 - 35.4 SEC  
HEPATIC FUNCTION PANEL Collection Time: 03/16/20 11:35 AM  
Result Value Ref Range Protein, total 7.3 6.3 - 8.2 g/dL Albumin 2.2 (L) 3.2 - 4.6 g/dL Globulin 5.1 (H) 2.3 - 3.5 g/dL A-G Ratio 0.4 (L) 1.2 - 3.5 Bilirubin, total 0.2 0.2 - 1.1 MG/DL Bilirubin, direct <0.1 <0.4 MG/DL Alk. phosphatase 139 (H) 50 - 136 U/L  
 AST (SGOT) 32 15 - 37 U/L  
 ALT (SGPT) 37 12 - 65 U/L  
GLUCOSE, POC Collection Time: 03/16/20 11:53 AM  
Result Value Ref Range Glucose (POC) 227 (H) 65 - 100 mg/dL GLUCOSE, POC Collection Time: 03/16/20  3:57 PM  
Result Value Ref Range Glucose (POC) 254 (H) 65 - 100 mg/dL All Micro Results Procedure Component Value Units Date/Time CULTURE, RESPIRATORY/SPUTUM/BRONCH Katelin Sacks STAIN [[id number]]  (Abnormal) Collected:  03/09/20 8083 Order Status:  Completed Specimen:  Sputum Updated:  03/12/20 4069 Special Requests: NO SPECIAL REQUESTS     
  GRAM STAIN 10 TO 50 WBC'S/OIF  
   0 TO 2 EPITHELIAL CELLS/OIF  
   FEW GRAM POSITIVE RODS MODERATE YEAST 4+ MUCUS PRESENT Culture result: MODERATE CANDIDA ALBICANS     
 CULTURE, BLOOD [[id number]] Collected:  03/06/20 0944 Order Status:  Completed Specimen:  Blood Updated:  03/11/20 8339 Special Requests: --     
  RIGHT Antecubital 
  
  Culture result: NO GROWTH 5 DAYS     
 CULTURE, BLOOD [[id number]] Collected:  03/06/20 3557 Order Status:  Completed Specimen:  Blood Updated:  03/11/20 2370 Special Requests: --     
  LEFT Antecubital 
  
  Culture result: NO GROWTH 5 DAYS     
 CULTURE, RESPIRATORY/SPUTUM/BRONCH Katelin Sacks STAIN [[id number]] Collected:  03/06/20 1600 Order Status:  Canceled Specimen:  Sputum C. DIFFICILE AG & TOXIN A/B [[id number]] Order Status:  Canceled Specimen:  Stool CULTURE, RESPIRATORY/SPUTUM/BRONCH Paola Robles [[id number]] Collected:  02/17/20 1220 Order Status:  Completed Specimen:  Sputum from Bronchial Washing Updated:  02/24/20 1244 Special Requests: NO SPECIAL REQUESTS     
  GRAM STAIN 20 TO 35 WBCS SEEN PER OIF  
   1 TO 2 EPITHELIAL CELLS SEEN PER OIF  
      
  MODERATE GRAM POSITIVE COCCI  
     
   FEW GRAM POSITIVE RODS Culture result:    
  LIGHT NORMAL RESPIRATORY BRENNAN Current Meds: 
Current Facility-Administered Medications Medication Dose Route Frequency  [START ON 3/17/2020] sertraline (ZOLOFT) tablet 100 mg  100 mg Oral DAILY  buPROPion [facility]) tablet 75 mg  75 mg Oral BID  argatroban 50 mg in 0.9% sodium chloride 50 mL (1000 mcg/mL) infusion 0.5-10 mcg/kg/min IntraVENous TITRATE  diphenhydrAMINE (BENADRYL) capsule 25 mg  25 mg Oral Q6H PRN  
 WARFARIN ON HOLD    Other Rx Dosing/Monitoring  mirtazapine (REMERON) tablet 15 mg  15 mg Oral QHS  ALPRAZolam (XANAX) tablet 0.5 mg  0.5 mg Oral Q6H PRN  
 furosemide (LASIX) injection 80 mg  80 mg IntraVENous BID  loratadine (CLARITIN) tablet 10 mg  10 mg Oral DAILY PRN  
 bacitracin 500 unit/gram ointment   Topical BID  insulin lispro (HUMALOG) injection   SubCUTAneous AC&HS  
 dextrose 40% (GLUTOSE) oral gel 1 Tube  15 g Oral PRN  
 glucagon (GLUCAGEN) injection 1 mg  1 mg IntraMUSCular PRN  
 dextrose (D50W) injection syrg 12.5-25 g  25-50 mL IntraVENous PRN  
 HYDROcodone-homatropine (HYCODAN) 5-1.5 mg/5 mL (5 mL) syrup 5 mL  5 mL Oral Q4H PRN  
 benzonatate (TESSALON) capsule 100 mg  100 mg Oral TID PRN  
 guaiFENesin ER (MUCINEX) tablet 1,200 mg  1,200 mg Oral Q12H  
 albuterol (PROVENTIL VENTOLIN) nebulizer solution 2.5 mg  2.5 mg Nebulization Q6H RT  
 insulin glargine (LANTUS) injection 5 Units  5 Units SubCUTAneous QHS  sodium chloride (OCEAN) 0.65 % nasal squeeze bottle 2 Spray  2 Spray Both Nostrils Q2H PRN  
 morphine injection 2 mg  2 mg IntraVENous Q6H PRN  
 sodium chloride 3% hypertonic nebulizer soln  4 mL Nebulization BID RT  
 sodium chloride (OCEAN) 0.65 % nasal squeeze bottle 2 Spray  2 Spray Both Nostrils BID  epoetin maritza-epbx (RETACRIT) 14,000 Units combo injection  14,000 Units SubCUTAneous Q7D  
 polyethylene glycol (MIRALAX) packet 17 g  17 g Oral DAILY  midodrine (PROAMITINE) tablet 10 mg  10 mg Oral TID WITH MEALS  tamsulosin (FLOMAX) capsule 0.4 mg  0.4 mg Oral QHS  loperamide (IMODIUM) capsule 4 mg  4 mg Oral QID PRN  pantothenic ac-min oil-pet,hyd (AQUAPHOR) 41 % ointment   Topical DAILY  traMADol (ULTRAM) tablet 50 mg  50 mg Oral Q6H PRN  
 sodium chloride (NS) flush 5-40 mL  5-40 mL IntraVENous PRN  
  acetaminophen (TYLENOL) tablet 650 mg  650 mg Oral Q4H PRN  
 naloxone (NARCAN) injection 0.4 mg  0.4 mg IntraVENous PRN  
 ondansetron (ZOFRAN) injection 4 mg  4 mg IntraVENous Q4H PRN  
 bisacodyL (DULCOLAX) tablet 5 mg  5 mg Oral DAILY PRN Other Studies: 
 
Ct Chest W Cont Result Date: 2/14/2020 CTA OF THE CHEST - PE STUDY HISTORY: Acute shortness of breath COMPARISON: None TECHNIQUE: A helical acquisition was performed through the chest utilizing 1.74IL slice thickness during the infusion of 100 cc of Isovue-370. 3-D post-processed images were created on an independent workstation. Multiplanar reformats were obtained. The exam was focused on the pulmonary arteries. Dose reduction techniques used: Automated exposure control, adjustment of the mAs and/or kVp according to patient's size, and iterative reconstruction techniques. FINDINGS: *  PULMONARY VESSELS: No evidence of pulmonary embolism. *  PLEURA / PERICARDIUM: Left pleural effusion. *  CAROL / MEDIASTINUM: Dilated diffuse fluid-filled esophagus. No evidence of a hiatal hernia. *  LUNGS: Consolidation of the entire left lower lobe. Linear atelectasis of the right middle and right upper lobe. *  TRACHEOBRONCHIAL TREE: Within normal limits. *  AORTA: Within normal limits. *  CORONARY ARTERIES: Extensive coronary artery calcification is seen. *  CHEST WALL/AXILLA: Within normal limits. *  VISUALIZED UPPER ABDOMEN: Within normal limits. *  SPINE / BONES: Status post CABG. *  ADDITIONAL COMMENTS: None. IMPRESSION: No evidence of pulmonary embolism. Left lower lobe atelectasis or pneumonia with a left pleural effusion. Linear atelectasis of the right middle and right upper lobes. Dilated diffuse fluid-filled esophagus. No evidence of a hiatal hernia or obstructing mass. I question of the patient could have gastroesophageal reflux disease. Coronary artery disease status post CABG. Date of Dictation: 2/14/2020 12:25 AM 
 
Xr Chest Cheikh Lacks Result Date: 2/13/2020 EXAMINATION: CHEST RADIOGRAPH 2/13/2020 3:55 PM ACCESSION NUMBER: [id number] INDICATION: shob COMPARISON: Chest x-ray 2/8/2020 TECHNIQUE: A single AP view of the chest was obtained. FINDINGS: Support Lines and Tubes: Unchanged cardiac pacemaker positioning. Unchanged left central venous catheter positioning. Cardiac Silhouette: Unchanged enlargement of the cardiac silhouette. Lungs: Unchanged left basilar opacity, moderate left pleural effusion. Improving interstitial edema. Pleura: No pneumothorax. Osseous Structures: Prior median sternotomy. Upper Abdomen: Unremarkable. IMPRESSION: 1. Improving interstitial edema. 2. Unchanged left basilar opacity and left pleural effusion. 3. Unchanged enlargement of the cardiac silhouette. VOICE DICTATED BY: Dr. Laura Rae Assessment and Plan:  
 
Hospital Problems as of 3/16/2020 Date Reviewed: 3/9/2020 Codes Class Noted - Resolved POA Acute on chronic respiratory failure with hypoxia [facility]) ICD-10-CM: U52.75 
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 
ICD-9-CM: 250.40, 583.81  8/26/2019 - Present Yes Obesity, morbid ([facility] (Chronic) ICD-10-CM: E66.01 
ICD-9-CM: 278.01  10/19/2018 - Present Yes DM type 2 (diabetes mellitus, type 2) (HCC) (Chronic) ICD-10-CM: E11.9 ICD-9-CM: 250.00  1/14/2013 - Present Yes HLD (hyperlipidemia) (Chronic) ICD-10-CM: O01.4 ICD-9-CM: 272.4  1/14/2013 - Present Yes RESOLVED: Acute-on-chronic kidney injury ([facility] ICD-10-CM: N17.9, N18.9 ICD-9-CM: 584.9, 585.9  2/17/2020 - 2/25/2020 Yes RESOLVED: Atelectasis ICD-10-CM: J98.11 ICD-9-CM: 518.0  2/17/2020 - 2/25/2020 Yes RESOLVED: Fluid overload ICD-10-CM: E87.70 ICD-9-CM: 276.69  2/15/2020 - 2/25/2020 Yes RESOLVED: Pleural effusion ICD-10-CM: J90 ICD-9-CM: 511.9  2/3/2020 - 2/25/2020 Yes * (Principal) RESOLVED: Acute hypoxemic respiratory failure ([facility] ICD-10-CM: J96.01 
ICD-9-CM: 518.81  1/10/2020 - 2/25/2020 Yes RESOLVED: HTN (hypertension) ICD-10-CM: I10 
ICD-9-CM: 401.9  1/14/2013 - 2/25/2020 Yes Plan: 
Acute hypoxemic respiratory failure with Progressive LLL consolidation - ID on board: back on abx, vanc/cefepime EOT 3/13/2020.  
- S/p bronch 2/17. Pulm following. 
  
LUE pain 
- US : no DVT but Thrombosed superficial branch of the left cephalic vein. DEJUAN now on HD 
- HD per nephro 
  
DM2 
- sliding scale 
- holding Basal/prandial insulins with sugars reasonable though occasional spikes throughout the day 
  
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 
-Hold warfarin 
-Start argatroban on INR less than 2.5 to plan for thoracentesis per pulmonology 
  
CAD S/p CABG/AVR January 2020 AFib 
- Home meds. 
  
MDD, passive suicidal ideation: 
-Patient has stated on several times \"doc, just shoot me\".   Patient is voiced hopelessness and \"I just want to give up\" to nurse. Patient asking me about physician assisted suicide. 
-Start bupropion 75 mg twice daily 
-Increase sertraline to 100 mg daily 
-Continue mirtazapine 15 mg every night Diet:  DIET DIABETIC CONSISTENT CARB 
DIET NUTRITIONAL SUPPLEMENTS 
DVT PPx: warfarin Code: DNR Signed By: Jason Neely MD   
 March 16, 2020

## 2020-03-16 NOTE — PROGRESS NOTES
SBAR received from [place], [address] [place] [address]. Pt resting in bed. Respirations even and unlabored. No s/sx of distress noted. Call light within reach, bed low and locked, door open.

## 2020-03-16 NOTE — PROGRESS NOTES
Received bedside shift report from off going nurse, Scotty jolly. Patient resting in bed quietly. Respirations even and unlabored on 3 L via nasal cannula. Bed low and locked. Bedside table, personal belongings and call light all within reach.

## 2020-03-16 NOTE — PROGRESS NOTES
Problem: Pressure Injury - Risk of 
Goal: *Prevention of pressure injury Description: Document Alfredo Scale and appropriate interventions in the flowsheet. Outcome: Progressing Towards Goal 
Note: Pressure Injury Interventions: 
Sensory Interventions: Assess changes in LOC Moisture Interventions: Absorbent underpads Activity Interventions: Pressure redistribution bed/mattress(bed type) Mobility Interventions: Pressure redistribution bed/mattress (bed type) Nutrition Interventions: Document food/fluid/supplement intake Friction and Shear Interventions: Apply protective barrier, creams and emollients, Lift sheet Problem: Falls - Risk of 
Goal: *Absence of Falls Description: Document Giuliana Latin Fall Risk and appropriate interventions in the flowsheet. Outcome: Progressing Towards Goal 
Note: Fall Risk Interventions: 
Mobility Interventions: Patient to call before getting OOB Mentation Interventions: Adequate sleep, hydration, pain control Medication Interventions: Teach patient to arise slowly Elimination Interventions: Call light in reach History of Falls Interventions: Door open when patient unattended Problem: Patient Education: Go to Patient Education Activity Goal: Patient/Family Education Outcome: Progressing Towards Goal 
  
Problem: Patient Education: Go to Patient Education Activity Goal: Patient/Family Education Outcome: Progressing Towards Goal 
  
Problem: Breathing Pattern - Ineffective Goal: *Absence of hypoxia Outcome: Progressing Towards Goal 
  
Problem: Nutrition Deficit Goal: *Optimize nutritional status Outcome: Progressing Towards Goal 
  
Problem: Gas Exchange - Impaired Goal: *Absence of hypoxia Outcome: Progressing Towards Goal 
  
Problem: Gas Exchange - Impaired Goal: *Absence of hypoxia Outcome: Progressing Towards Goal 
  
Problem: Risk for Spread of Infection Goal: Prevent transmission of infectious organism to others Description: Prevent the transmission of infectious organisms to other patients, staff members, and visitors. Outcome: Progressing Towards Goal 
  
Problem: Patient Education:  Go to Education Activity Goal: Patient/Family Education Outcome: Progressing Towards Goal

## 2020-03-16 NOTE — PROGRESS NOTES
03/16/20 0040 Oxygen Therapy O2 Sat (%) 97 % Pulse via Oximetry 84 beats per minute O2 Device Nasal cannula O2 Flow Rate (L/min) 3 l/min CPAP/BIPAP  
CPAP/BIPAP Start/Stop Off

## 2020-03-16 NOTE — PROGRESS NOTES
[facility] PROGRESS NOTE Follow up for: 
 
Subjective:  
Patient seen and examined. ROS: 
Gen - no fever, no chills, appetite okay CV - no chest pain, no orthopnea Lung - no shortness of breath, no cough Ext - no edema. Left side TCC Objective:  
Exam: 
Vitals:  
 03/16/20 0400 03/16/20 0418 03/16/20 0743 03/16/20 1202 BP: 156/69  126/68 101/58 Pulse: 80  83 82 Resp: 19  18 18 Temp: 98.1 °F (36.7 °C)  98 °F (36.7 °C) 97.4 °F (36.3 °C) SpO2: 96% 93% 98% 93% Weight: 99.9 kg (220 lb 3.2 oz) Height:      
 
 
 
Intake/Output Summary (Last 24 hours) at 3/16/2020 1408 Last data filed at 3/16/2020 1350 Gross per 24 hour Intake 820 ml Output 210 ml Net 610 ml  
 
 
Current Facility-Administered Medications Medication Dose Route Frequency  [START ON 3/17/2020] sertraline (ZOLOFT) tablet 100 mg  100 mg Oral DAILY  buPROPion [facility]) tablet 75 mg  75 mg Oral BID  argatroban 50 mg in 0.9% sodium chloride 50 mL (1000 mcg/mL) infusion  0.5-10 mcg/kg/min IntraVENous TITRATE  diphenhydrAMINE (BENADRYL) capsule 25 mg  25 mg Oral Q6H PRN  
 WARFARIN ON HOLD    Other Rx Dosing/Monitoring  mirtazapine (REMERON) tablet 15 mg  15 mg Oral QHS  ALPRAZolam (XANAX) tablet 0.5 mg  0.5 mg Oral Q6H PRN  
 furosemide (LASIX) injection 80 mg  80 mg IntraVENous BID  loratadine (CLARITIN) tablet 10 mg  10 mg Oral DAILY PRN  
 bacitracin 500 unit/gram ointment   Topical BID  insulin lispro (HUMALOG) injection   SubCUTAneous AC&HS  
 dextrose 40% (GLUTOSE) oral gel 1 Tube  15 g Oral PRN  
 glucagon (GLUCAGEN) injection 1 mg  1 mg IntraMUSCular PRN  
 dextrose (D50W) injection syrg 12.5-25 g  25-50 mL IntraVENous PRN  
 HYDROcodone-homatropine (HYCODAN) 5-1.5 mg/5 mL (5 mL) syrup 5 mL  5 mL Oral Q4H PRN  
 benzonatate (TESSALON) capsule 100 mg  100 mg Oral TID PRN  
 guaiFENesin ER (MUCINEX) tablet 1,200 mg  1,200 mg Oral Q12H  albuterol (PROVENTIL VENTOLIN) nebulizer solution 2.5 mg  2.5 mg Nebulization Q6H RT  
 insulin glargine (LANTUS) injection 5 Units  5 Units SubCUTAneous QHS  sodium chloride (OCEAN) 0.65 % nasal squeeze bottle 2 Spray  2 Spray Both Nostrils Q2H PRN  
 morphine injection 2 mg  2 mg IntraVENous Q6H PRN  
 sodium chloride 3% hypertonic nebulizer soln  4 mL Nebulization BID RT  
 sodium chloride (OCEAN) 0.65 % nasal squeeze bottle 2 Spray  2 Spray Both Nostrils BID  epoetin maritza-epbx (RETACRIT) 14,000 Units combo injection  14,000 Units SubCUTAneous Q7D  
 polyethylene glycol (MIRALAX) packet 17 g  17 g Oral DAILY  midodrine (PROAMITINE) tablet 10 mg  10 mg Oral TID WITH MEALS  tamsulosin (FLOMAX) capsule 0.4 mg  0.4 mg Oral QHS  loperamide (IMODIUM) capsule 4 mg  4 mg Oral QID PRN  pantothenic ac-min oil-pet,hyd (AQUAPHOR) 41 % ointment   Topical DAILY  traMADol (ULTRAM) tablet 50 mg  50 mg Oral Q6H PRN  
 sodium chloride (NS) flush 5-40 mL  5-40 mL IntraVENous PRN  
 acetaminophen (TYLENOL) tablet 650 mg  650 mg Oral Q4H PRN  
 naloxone (NARCAN) injection 0.4 mg  0.4 mg IntraVENous PRN  
 ondansetron (ZOFRAN) injection 4 mg  4 mg IntraVENous Q4H PRN  
 bisacodyL (DULCOLAX) tablet 5 mg  5 mg Oral DAILY PRN  
 
 
EXAM 
GEN - Alert, oriented, in no distress CV - S1, S2, RRR, no rub, murmur, or gallop Lung - clear to auscultation bilaterally Abd - soft, nontender, BS present Ext - no edema No results for input(s): WBC, HGB, HCT, PLT, HGBEXT, HCTEXT, PLTEXT in the last 72 hours. Recent Labs  
  03/16/20 
0704 03/15/20 
0622 03/14/20 
1248 * 136 139  
K 4.0 3.8 4.5  
 101 105 CO2 28 29 27 BUN 29* 15 15 CREA 4.27* 2.81* 2.42* CA 8.3 8.8 8.7 * 139* 112* Assessment and Plan:  
 
1) DEJUAN on CKD-  No sign of recovery. Next HD will be tomorrow. 2) Hypotension-  On midodrine prior to dialysis 3)  Hypoxic respiratory failure- pulmonary following. 3L O2 
 
4) HIT positive- on argatroban Asha Nevarez MD

## 2020-03-16 NOTE — PROGRESS NOTES
Addendum  created 09/18/19 1234 by Miguel Hagan MD    Sign clinical note Jase Mckeon Admission Date: 2/13/2020 Daily Progress Note: 3/16/2020 The patient's chart is reviewed and the patient is discussed with the staff. 68 yo Kenia Landaverde a history of CAD s/p CABG with AVR 1/10/20 by Dr. Vinita Johnston complicated by CKD requiring HD, HIT +, DVTs, wound left thigh and pneumonia.  Was discharged on Levaquin Q 48 hours last dose 2/11/20. Shanta Holliday was discharged on NC 2 L but has been increased to NC 4L O2 at [facility]. He presented to the ER on 2/15 with increased SOB. CTA chest was negative for PE but persistent LLL opacity. He had a bronch 2/17 with negative cultures and was treated for PNA using levaquin. He is now ESRD and being followed by nephrology.  
  
  We were reconsulted on 3/6 for acute respiratory failure requiring bipap. CXR shows increased consolidation in the left base. He is febrile with a temp of 100.5. He has refused a swallow evaluation. ID is following and he was started on Vanc and Cefepime on 3/6. Subjective:  
 
Patient resting in bed. Currently on 3L O2. Minimal cough and sputum production. Current Facility-Administered Medications Medication Dose Route Frequency  [START ON 3/17/2020] sertraline (ZOLOFT) tablet 100 mg  100 mg Oral DAILY  buPROPion [facility]) tablet 75 mg  75 mg Oral BID  argatroban 50 mg in 0.9% sodium chloride 50 mL (1000 mcg/mL) infusion  0.5-10 mcg/kg/min IntraVENous TITRATE  diphenhydrAMINE (BENADRYL) capsule 25 mg  25 mg Oral Q6H PRN  
 WARFARIN ON HOLD    Other Rx Dosing/Monitoring  mirtazapine (REMERON) tablet 15 mg  15 mg Oral QHS  ALPRAZolam (XANAX) tablet 0.5 mg  0.5 mg Oral Q6H PRN  
 furosemide (LASIX) injection 80 mg  80 mg IntraVENous BID  loratadine (CLARITIN) tablet 10 mg  10 mg Oral DAILY PRN  
 bacitracin 500 unit/gram ointment   Topical BID  insulin lispro (HUMALOG) injection   SubCUTAneous AC&HS  
 dextrose 40% (GLUTOSE) oral gel 1 Tube  15 g Oral PRN  
  glucagon (GLUCAGEN) injection 1 mg  1 mg IntraMUSCular PRN  
 dextrose (D50W) injection syrg 12.5-25 g  25-50 mL IntraVENous PRN  
 HYDROcodone-homatropine (HYCODAN) 5-1.5 mg/5 mL (5 mL) syrup 5 mL  5 mL Oral Q4H PRN  
 benzonatate (TESSALON) capsule 100 mg  100 mg Oral TID PRN  
 guaiFENesin ER (MUCINEX) tablet 1,200 mg  1,200 mg Oral Q12H  
 albuterol (PROVENTIL VENTOLIN) nebulizer solution 2.5 mg  2.5 mg Nebulization Q6H RT  
 insulin glargine (LANTUS) injection 5 Units  5 Units SubCUTAneous QHS  sodium chloride (OCEAN) 0.65 % nasal squeeze bottle 2 Spray  2 Spray Both Nostrils Q2H PRN  
 morphine injection 2 mg  2 mg IntraVENous Q6H PRN  
 sodium chloride 3% hypertonic nebulizer soln  4 mL Nebulization BID RT  
 sodium chloride (OCEAN) 0.65 % nasal squeeze bottle 2 Spray  2 Spray Both Nostrils BID  epoetin maritza-epbx (RETACRIT) 14,000 Units combo injection  14,000 Units SubCUTAneous Q7D  
 polyethylene glycol (MIRALAX) packet 17 g  17 g Oral DAILY  midodrine (PROAMITINE) tablet 10 mg  10 mg Oral TID WITH MEALS  tamsulosin (FLOMAX) capsule 0.4 mg  0.4 mg Oral QHS  loperamide (IMODIUM) capsule 4 mg  4 mg Oral QID PRN  pantothenic ac-min oil-pet,hyd (AQUAPHOR) 41 % ointment   Topical DAILY  traMADol (ULTRAM) tablet 50 mg  50 mg Oral Q6H PRN  
 sodium chloride (NS) flush 5-40 mL  5-40 mL IntraVENous PRN  
 acetaminophen (TYLENOL) tablet 650 mg  650 mg Oral Q4H PRN  
 naloxone (NARCAN) injection 0.4 mg  0.4 mg IntraVENous PRN  
 ondansetron (ZOFRAN) injection 4 mg  4 mg IntraVENous Q4H PRN  
 bisacodyL (DULCOLAX) tablet 5 mg  5 mg Oral DAILY PRN Review of Systems Constitutional: negative for fever, chills, sweats Cardiovascular: negative for chest pain, palpitations, syncope, edema Gastrointestinal: negative for dysphagia, reflux, vomiting, diarrhea, abdominal pain, or melena Neurologic: negative for focal weakness, numbness, headache Objective:  
 
Vitals: 03/16/20 0400 03/16/20 0418 03/16/20 0743 03/16/20 1202 BP: 156/69  126/68 101/58 Pulse: 80  83 82 Resp: 19  18 18 Temp: 98.1 °F (36.7 °C)  98 °F (36.7 °C) 97.4 °F (36.3 °C) SpO2: 96% 93% 98% 93% Weight: 220 lb 3.2 oz (99.9 kg) Height:      
 
Intake and Output:  
03/14 1901 - 03/16 0700 In: 580 [P.O.:580] Out: 210 [Urine:210] 03/16 0701 - 03/16 1900 In: 240 [P.O.:240] Out: - Physical Exam:  
Constitution:  the patient is well developed and in no acute distress EENMT:  Sclera clear, pupils equal, oral mucosa moist 
Respiratory: CTA B in anterior lung fields. Cardiovascular:  RRR without M,G,R 
Gastrointestinal: soft and non-tender; with positive bowel sounds. Musculoskeletal: warm without cyanosis. There is no lower leg edema. Skin:  no jaundice or rashes, no wounds Neurologic: no gross neuro deficits Psychiatric:  alert and oriented x ppt CHEST XRAY:  
No new imaging LAB No lab exists for component: [place] Recent Labs  
  03/16/20 
0704 03/15/20 
0622 03/14/20 
4638 INR 2.5 2.8 3.5 Recent Labs  
  03/16/20 
0704 03/15/20 
0622 03/14/20 
2096 * 136 139  
K 4.0 3.8 4.5  
 101 105 CO2 28 29 27 * 139* 112* BUN 29* 15 15 CREA 4.27* 2.81* 2.42* CA 8.3 8.8 8.7 ABG:  No results found for: PH, PHI, PCO2, PCO2I, PO2, PO2I, HCO3, HCO3I, FIO2, FIO2I Assessment:  (Medical Decision Making) Hospital Problems  Date Reviewed: 3/9/2020 Codes Class Noted POA Acute on chronic respiratory failure with hypoxia [facility]) ICD-10-CM: J96.21 
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 
ICD-9-CM: 250.40, 583.81  8/26/2019 Yes Obesity, morbid ([address] (Chronic) ICD-10-CM: E66.01 
ICD-9-CM: 278.01  10/19/2018 Yes DM type 2 (diabetes mellitus, type 2) (HCC) (Chronic) ICD-10-CM: E11.9 ICD-9-CM: 250.00  1/14/2013 Yes HLD (hyperlipidemia) (Chronic) ICD-10-CM: K76.2 ICD-9-CM: 272.4  1/14/2013 Yes Plan:  (Medical Decision Making)  
-not currently on anything for airway clearance. Will add vibralung which patient states he is willing to participate with.  
-repeat cxr today and daily. inr 2.5. if significant fluid will plan on US tomorrow for possible thoracentesis. -cont to wean o2 as sats allow. Continue albuterol.  
-cont lasix.   
 
 
 
Carri Mai MD

## 2020-03-16 NOTE — PROGRESS NOTES
Problem: Mobility Impaired (Adult and Pediatric) Goal: *Acute Goals and Plan of Care (Insert Text) Description LTG: (Reviewed and updated 3/13/20) (1.)Mr. Lula Polanco will move from supine to sit and sit to supine , scoot up and down and roll side to side INDEPENDENTLY within 7 treatment day(s) from flat surface. (2.)Mr. Lula Polanco will transfer from bed to chair and chair to bed with STAND BY ASSIST using the least restrictive device within 7 treatment day(s). (3.)Mr. Lula Polanco will ambulate with CONTACT GUARD ASSIST for 75+ feet with the least restrictive device within 7 treatment day(s) while maintaining normal vital signs. (4.)Mr. Lula Polanco will perform exercises per HEP for 10+ minutes to improve strength and mobility within 7 days. ____________________________________________________________________________________________ Outcome: Progressing Towards Goal 
  
PHYSICAL THERAPY: Daily Note and AM 3/16/2020 INPATIENT: PT Visit Days : 2 Payor: Valentina Cardoso / Plan: Carlie Copier / Product Type: [employer] /   
  
NAME/AGE/GENDER: Mayco Leslie is a 69 y.o. male PRIMARY DIAGNOSIS: Acute respiratory failure ([facility] [J96.00] Fluid overload [E87.70] Acute hypoxemic respiratory failure (HCC) Acute hypoxemic respiratory failure (HCC) Procedure(s) (LRB): ULTRASOUND (Bilateral) 6 Days Post-Op ICD-10: Treatment Diagnosis:  
 · Generalized Muscle Weakness (M62.81) · Difficulty in walking, Not elsewhere classified (R26.2) Precaution/Allergies: 
Heparin; Amoxicillin; Keflex [cephalexin]; and Pcn [penicillins] ASSESSMENT:  
 
Mr. Lula Polanco is supine and willing to try and get up. He says he has  Not been out of bed for a few days. He rolled over and needed min/mod A to sit up from sidelying. He reported dizziness and wanted his BP taken many times during treatment.   His BP did drop some in sitting and he did not tolerate standing for more than 10 seconds. He was able to stand and take a couple steps to the chair with minimal assist and did want to stay up in the chair. Limited progress 111/50 sitting initially 90/54 sitting (after standing) 99/53 sitting (post chair transfer) This section established at most recent assessment PROBLEM LIST (Impairments causing functional limitations): 1. Decreased Strength 2. Decreased ADL/Functional Activities 3. Decreased Transfer Abilities 4. Decreased Ambulation Ability/Technique 5. Decreased Balance 6. Decreased Activity Tolerance 7. Increased Fatigue 8. Increased Shortness of Breath 9. Edema/Girth INTERVENTIONS PLANNED: (Benefits and precautions of physical therapy have been discussed with the patient.) 1. Balance Exercise 2. Bed Mobility 3. Family Education 4. Gait Training 5. Home Exercise Program (HEP) 6. Neuromuscular Re-education/Strengthening 7. Therapeutic Activites 8. Therapeutic Exercise/Strengthening 9. Transfer Training TREATMENT PLAN: Frequency/Duration: 3 times a week for duration of hospital stay Rehabilitation Potential For Stated Goals: Good REHAB RECOMMENDATIONS (at time of discharge pending progress):   
Placement: It is my opinion, based on this patient's performance to date, that Mr. Chau Hays may benefit from intensive therapy at [address] after discharge due to the functional deficits listed above that are likely to improve with skilled rehabilitation and concerns that he/she may be unsafe to be unsupervised at home due to a fall risk, safety concerns for transfers and ambulation at home. Equipment: ? To be determined HISTORY:  
History of Present Injury/Illness (Reason for Referral): Mr. 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 Was DC 2/12/20 from CABG and aortic valve replacement complicated by CKD requiring HD, HIT +, DVTs, wound left thigh on wound vac, pneumonia DC on levaquin Q 48 hours last dose 2/11/20. He was DC on 2 L O2 however has been increased to 4L O2 via NC at [facility] yesterday when he arrived. Today he was at HD and reports dizziness with sitting up and acute onset of SOB. Son at bedside and states pt was doing ok since DC yesterday until today at HD. INR 2.4. CXR showed improving interstitial edema, unchanged left basilar opacity and left pleural effusion. Pt given lasix in ED. Pt is SOB when talking in short sentences. Denies CP, n/v.  Has had diarrhea however is not new since hospitalization. Past Medical History/Comorbidities:  
Mr. Shawna Tijerina  has a past medical history of Arthritis, BPH (benign prostatic hyperplasia) (1/14/2013), CAD (coronary artery disease), DM type 2 (diabetes mellitus, type 2) ([facility] (dx 2004), Dyspnea, Gout (1/14/2013), HLD (hyperlipidemia) (1/14/2013), HTN (hypertension), Morbid obesity ([facility] (9/3/14), Psychiatric disorder, Rheumatic fever, Seasonal allergic rhinitis, Severe aortic valve stenosis, Thyroid disease, and Unspecified sleep apnea (2016). Mr. Shawna Tijerina  has a past surgical history that includes hx tonsil and adenoidectomy; hx heart catheterization (12/23/2019); hx orthopaedic (Left); hx cataract removal (Bilateral, 2012); and ir insert tunl cvc w port over 5 years (2/4/2020). Social History/Living Environment:  
Home Environment: Private residence # Steps to Enter: 2 Rails to Enter: No 
One/Two Story Residence: One story Living Alone: No 
Support Systems: Spouse/Significant Other/Partner Patient Expects to be Discharged to[de-identified] Rehabilitation facility Current DME Used/Available at Home: Cane, straight, Walker, rolling Tub or Shower Type: Shower Prior Level of Function/Work/Activity: 
Lives with spouse, admit from rehab; has been using RW short distance ambulation, assist ADLs Personal Factors:   
      Sex:  male Age:  69 y.o. Overall Behavior:  agreeable Number of Personal Factors/Comorbidities that affect the Plan of Care: 
CAD, DM, DVTs, age 3+: HIGH COMPLEXITY EXAMINATION:  
Most Recent Physical Functioning:  
Gross Assessment: 
  
         
  
Posture: 
  
Balance: 
  Bed Mobility: 
Supine to Sit: Minimum assistance; Moderate assistance Sit to Supine: Contact guard assistance Wheelchair Mobility: 
  
Transfers: 
Sit to Stand: Minimum assistance Stand to Sit: Contact guard assistance Bed to Chair: Contact guard assistance;Minimum assistance Gait: 
  
   
  
Body Structures Involved: 1. Heart 2. Lungs 3. Muscles Body Functions Affected: 1. Cardio 2. Respiratory 3. Movement Related Activities and Participation Affected: 1. General Tasks and Demands 2. Mobility 3. Self Care Number of elements that affect the Plan of Care: 4+: HIGH COMPLEXITY CLINICAL PRESENTATION:  
Presentation: Evolving clinical presentation with changing clinical characteristics: MODERATE COMPLEXITY CLINICAL DECISION MAKIN[address] [place], [place] [id number] AM-PAC 6 Clicks Basic Mobility Inpatient Short Form How much difficulty does the patient currently have. .. Unable A Lot A Little None 1. Turning over in bed (including adjusting bedclothes, sheets and blankets)? [] 1   [] 2   [x] 3   [] 4  
2. Sitting down on and standing up from a chair with arms ( e.g., wheelchair, bedside commode, etc.)   [] 1   [] 2   [x] 3   [] 4  
3. Moving from lying on back to sitting on the side of the bed? [] 1   [x] 2   [] 3   [] 4 How much help from another person does the patient currently need. .. Total A Lot A Little None 4. Moving to and from a bed to a chair (including a wheelchair)?    [] 1   [] 2   [x] 3   [] 4  
 5.  Need to walk in hospital room? [] 1   [] 2   [x] 3   [] 4  
6. Climbing 3-5 steps with a railing? [x] 1   [] 2   [] 3   [] 4  
© 2007, Trustees of [address] [place] [address] [address] [address], under license to [employer]. All rights reserved Score:  Initial: 17 Most Recent: 15 (Date: 3/13/20 ) Interpretation of Tool:  Represents activities that are increasingly more difficult (i.e. Bed mobility, Transfers, Gait). Medical Necessity:    
· Patient is expected to demonstrate progress in  
· strength, range of motion, balance, and coordination ·  to  
· decrease assistance required with overall functional mobility, transfers, ambulation · . · Patient demonstrates · good ·  rehab potential due to higher previous functional level. Reason for Services/Other Comments: 
· Patient · continues to require present interventions due to patient's inability to perform bed mobility, transfers, ambulation safely and effectively · . Use of outcome tool(s) and clinical judgement create a POC that gives a: Clear prediction of patient's progress: LOW COMPLEXITY  
  
 
 
 
TREATMENT:  
(In addition to Assessment/Re-Assessment sessions the following treatments were rendered) Pre-treatment Symptoms/Complaints:  \"I'm getting worse\" Pain: Initial:  
Pain Intensity 1: 0  Post Session: 0/10 PT Reassessment Table:  
Initial Physical Functioning: Most Recent Physical Functioning:  
Gross Assessment: 
AROM: Generally decreased, functional(B LE) Strength: Generally decreased, functional(B LE grossly 4/5) Coordination: Generally decreased, functional 
Sensation: Intact(B LE) Gross Assessment:  
   
Posture:  
Posture (WDL): Ex[facility] to [facility] Posture Assessment: Forward head, Rounded shoulders Posture:  
   
Balance:  
Sitting: Intact Sitting - Static: Good (unsupported) Sitting - Dynamic: Fair (occasional) Standing: Impaired Standing - Static: Good, Fair Standing - Dynamic : Fair Balance:  
   
Bed Mobility: Rolling: Stand-by assistance Supine to Sit: Minimum assistance Sit to Supine: Minimum assistance Scooting: Minimum assistance Interventions: Manual cues, Verbal cues Bed Mobility:  
Supine to Sit: Minimum assistance; Moderate assistance Sit to Supine: Contact guard assistance Transfers:  
Sit to Stand: Minimum assistance Stand to Sit: Minimum assistance Bed to Chair: Minimum assistance Interventions: Safety awareness training, Verbal cues, Visual cues Duration: 13 Minutes Transfers:  
Sit to Stand: Minimum assistance Stand to Sit: Contact guard assistance Bed to Chair: Contact guard assistance;Minimum assistance Gait:  
Base of Support: Widened Speed/Federica: Slow Step Length: Left shortened, Right shortened Swing Pattern: Left symmetrical, Right symmetrical 
Gait Abnormalities: Decreased step clearance Ambulation - Level of Assistance: Contact guard assistance, Minimal assistance Distance (ft): 5 Feet (ft)(from bed to chair, chair to bed) Assistive Device: Walker, rolling Interventions: Safety awareness training, Verbal cues Gait:  
   
 
Therapeutic Activity: (25 minutes ):  Therapeutic activities including Bed mobility, seated static/dynamic activities, sit-stand transfer training and Chair transfers to improve mobility, strength, balance, coordination and activity tolerance. Required minimal   to promote static and dynamic balance in standing and promote motor control of bilateral, lower extremity(s). Date: 
3/13/20 Date: 
 Date: Activity/Exercise Parameters Parameters Parameters LAQ 15x AB Seated marching 15x AB Ankle pumps 15x AB Seated hip aBd     
     
     
     
A=active, B=bilateral 
 
Braces/Orthotics/Lines/Etc:  
· Nasal cannula Treatment/Session Assessment:   
· Response to Treatment:   BP stable today during reassessment · Interdisciplinary Collaboration:  
o Physical Therapy Assistant 
o Registered Nurse · After treatment position/precautions: o Up in chair 
o Bed/Chair-wheels locked 
o Bed in low position 
o Call light within reach 
o RN notified · Compliance with Program/Exercises: Compliant all of the time · Recommendations/Intent for next treatment session: \"Next visit will focus on advancements to more challenging activities\" as tolerated Total Treatment Duration: PT Patient Time In/Time Out Time In: 4683 Time Out: 1005 Lashay Tapia, PTA

## 2020-03-16 NOTE — PROGRESS NOTES
Reassessment unchanged. Patient sleeping in bed. NAD noted. Respirations present and non labored on 3LNC. Call light within reach, will continue to monitor.

## 2020-03-16 NOTE — PROGRESS NOTES
Warfarin dosing per pharmacist 
 
Ruba Oas is a 69 y.o. male. Height: 5' 10\" (177.8 cm)   Weight 103.5 kg (228 lbs) Indication:  AVR and afib Goal INR:  2.5 - 3.5 Home dose:  2.5 mg daily Risk factors or significant drug interactions: HIT+(SHARON positive on 1/18/20), amiodarone (dc'd 2/5), Levofloxacin (2/13- 2/20 ), mirtazapine (2/13-2/24), escitalopram (2/19-2/20), trazodone (started 2/24) Other anticoagulants:  N/A Daily Monitoring Date  INR     Warfarin dose HGB              Notes 2/14  2.4  3 mg  8.7 2/15  2.2  4 mg  9.6 2/16  2.1  5 mg  9.8 2/17  2.1  5 mg  9.5 2/18  2.8  2 mg  9.3 2/19  2.5  2 mg  8.7 
2/20  1.9  3 mg   8.8 
2/21  1.9  4 mg  8.6 
2/22  2.1  3 mg  8.5 
2/23  1.9  4 mg  --- 
2/24  1.7  3 mg + 2 mg --- 
2/25  1.7  5 mg  --- 
2/26  1.6  6 mg  --- 
2/27  1.7  6 mg   --- 
2/28  1.7  7.5 mg  --- 
2/29  2.1 ` 6 mg  7.7 
3/1  2.0  7.5 mg  --- 
3/2  2.9  6 mg  --- 
3/3  3.4  5 mg  8.0 
3/4  2.7  7.5mg  8.6 
3/5  2.9  6 mg  8.0 
3/6  3.1  Held  8.4 
3/7  2.6  6 mg  8.0 
3/8  2.7  6mg  --- 
3/9  2.5  6 mg  --- 
3/10  2.7  6 mg  7.6 
3/11  3.3  5 mg  8.3 
3/12  4.3  Hold  8.2 
3/13  3.9  Hold  8.8 
3/14  3.5  Hold  --- 
3/15  2.8  Hold  --- 
3/16  2.5  Hold  --- 
 
 
 
Pulmonary considering thoracentesis next week. Continue to hold warfarin and let INR drift down. INR is now 2.5, patient will need to be started on argatroban bridge(HIT +). Daily INR. Thank you, Jerry Ellington, PharmD, [facility] Clinical Pharmacy Specialist 
[phone]

## 2020-03-16 NOTE — PROGRESS NOTES
03/16/20 7390 Oxygen Therapy O2 Sat (%) 93 % Pulse via Oximetry 80 beats per minute O2 Device Hi flow nasal cannula O2 Flow Rate (L/min) 3 l/min CPAP/BIPAP  
CPAP/BIPAP Start/Stop Off

## 2020-03-16 NOTE — PROGRESS NOTES
Assumed care of patient. Assessment completed and documented, see docflow. Patient resting quietly in bed. NAD noted at present. Respirations even and non labored on 3LNC Encouraged to call for needs. Call light within reach. Will continue to monitor

## 2020-03-17 NOTE — PROGRESS NOTES
Patient's insurance denied [facility]. It will go to a written appeal. Kiki Go with [facility] will keep CM updated.

## 2020-03-17 NOTE — PROGRESS NOTES
Hospitalist Note Admit Date:  2020  3:03 PM  
Name:  Jenise Villalobos Age:  69 y.o. 
:  1945 MRN:  [id number] PCP:  Ish Agee MD 
Treatment Team: Attending Provider: Elma Rodriguez MD; Consulting Provider: Oleg Quiroz MD; Utilization Review: Melanie Perera RN; Hospitalist: Joshua Soto NP; Hospitalist: Casa Mario NP; Consulting Provider: Deborah Yin MD; Consulting Provider: Nestor Portillo MD; Care Manager: Yaneth Finney; Utilization Review: Carlos Lesches; Consulting Provider: eMrlin Duvall MD; Consulting Provider: Yanet Serrano MD; Consulting Provider: Elma Rodriguez MD; Occupational Therapy Assistant: Afua Schulz; Primary Nurse: Marah Little RN 
 
HPI/Subjective:  
44-year-old male admitted with acute hypoxemic respiratory failure and progressive left lower lobe consolidation. Infectious disease and pulmonology are following and applied the patient on vancomycin and cefepime for failure of levofloxacin therapy as an outpatient. Patient had a bronchoscopy on 217 per pulmonology and that final result is pending. Patient recently underwent CABG and aortic valve repair in 2020 and had resultant atrial fibrillation. Post CABG patient developed renal failure and is requiring hemodialysis for renal clearance. 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 Patient diagnosed with pneumonia post CABG and discharged home with levofloxacin. Pulmonology feel the patient will likely need to have a thoracentesis in the near future therefore have coordinated with pharmacy to have the patient started on argatroban once his INR gets to the low end of 2. Patient was previously on argatroban at 0.5 mcg/kg/min and this will likely be the best starting dose for him. Pending placement to LTAC.  Patient continues to be significantly depressed and asked me today if I could facilitate physician assisted suicide. Uptitrated the patient sertraline started the patient on bupropion. Psychiatry re-evaluated the patient and recommend against involuntary commitment. INR 2.5, start argatroban. Today: Pulmonology to perform thoracentesis today. Patient underwent hemodialysis today. Insurance has denied the patient for LTAC placement and the appeal process has been begun. Objective:  
 
Patient Vitals for the past 24 hrs: 
 Temp Pulse Resp BP SpO2  
03/17/20 1432     95 % 03/17/20 1232 97.4 °F (36.3 °C) 86 19 134/68 96 % 03/17/20 1200  (!) 46  103/68   
03/17/20 1127  84  118/60   
03/17/20 1059  79  114/65   
03/17/20 1031  79  104/65   
03/17/20 0959  80  106/69   
03/17/20 0922  79  98/62   
03/17/20 0903  (!) 54  105/55   
03/17/20 0830  82  137/79   
03/17/20 0812  85  132/70   
03/17/20 0728 97.7 °F (36.5 °C) 80 21 147/76 93 % 03/17/20 0340 98.2 °F (36.8 °C) 86 22 93/54 97 % 03/17/20 0146     93 % 03/16/20 2254 97.5 °F (36.4 °C) 79 20 127/64 96 % 03/16/20 2223     97 % 03/16/20 2034     93 % 03/16/20 1949  90 22 133/73 97 % 03/16/20 1748  83 25 117/67 95 % 03/16/20 1526     94 % Oxygen Therapy O2 Sat (%): 95 % (03/17/20 1432) Pulse via Oximetry: 88 beats per minute (03/17/20 1432) O2 Device: Nasal cannula (03/17/20 1432) PEEP/CPAP (cm H2O): 3 cm H20 (03/15/20 1338) O2 Flow Rate (L/min): 4 l/min(decreased from 6L) (03/17/20 1432) O2 Temperature: 87.8 °F (31 °C) (03/10/20 0618) FIO2 (%): 35 % (03/17/20 0146) Estimated body mass index is 31.6 kg/m² as calculated from the following: 
  Height as of this encounter: 5' 10\" (1.778 m). Weight as of this encounter: 99.9 kg (220 lb 3.2 oz). Intake/Output Summary (Last 24 hours) at 3/17/2020 5141 Last data filed at 3/17/2020 1319 Gross per 24 hour Intake 240 ml Output 2550 ml Net -2310 ml  
   
 *Note that automatically entered I/Os may not be accurate; dependent on patient compliance with collection and accurate  by techs. Physical Exam:  
General:     alert, awake, no acute distress. Obese. Head:   normocephalic, atraumatic Eyes, Ears, nose: PERRL, EOMI. NC Neck:    supple, non-tender Lungs:   Wheezing Cardiac:   RRR, Normal S1 and S2. Abdomen:   Soft, non distended, nontender, +BS Extremities:   Warm, dry. B/l stasis dermatitis with mild edema. Left forearm and hand swelling Skin:   No rashes, no jaundice Neuro:  Alert and oriented to person, time, place, situation. No gross focal neurological deficit Psychiatric:  No anxiety, calm, cooperative Data Review: 
I have reviewed all labs, meds, and studies from the last 24 hours: 
 
Recent Results (from the past 24 hour(s)) GLUCOSE, POC Collection Time: 03/16/20  3:57 PM  
Result Value Ref Range Glucose (POC) 254 (H) 65 - 100 mg/dL PTT Collection Time: 03/16/20  6:35 PM  
Result Value Ref Range aPTT 48.1 (H) 24.3 - 35.4 SEC GLUCOSE, POC Collection Time: 03/16/20  8:38 PM  
Result Value Ref Range Glucose (POC) 234 (H) 65 - 100 mg/dL PTT Collection Time: 03/16/20 11:51 PM  
Result Value Ref Range aPTT 51.6 (H) 24.3 - 35.4 SEC PROTHROMBIN TIME + INR Collection Time: 03/17/20  3:39 AM  
Result Value Ref Range Prothrombin time 32.8 (H) 12.0 - 14.7 sec INR 3.1 METABOLIC PANEL, BASIC Collection Time: 03/17/20  3:39 AM  
Result Value Ref Range Sodium 134 (L) 136 - 145 mmol/L Potassium 3.7 3.5 - 5.1 mmol/L Chloride 99 98 - 107 mmol/L  
 CO2 26 21 - 32 mmol/L Anion gap 9 7 - 16 mmol/L Glucose 139 (H) 65 - 100 mg/dL BUN 49 (H) 8 - 23 MG/DL Creatinine 5.11 (H) 0.8 - 1.5 MG/DL  
 GFR est AA 14 (L) >60 ml/min/1.73m2 GFR est non-AA 12 (L) >60 ml/min/1.73m2 Calcium 8.5 8.3 - 10.4 MG/DL  
PTT Collection Time: 03/17/20  3:39 AM  
Result Value Ref Range aPTT 50.2 (H) 24.3 - 35.4 SEC GLUCOSE, POC Collection Time: 03/17/20  5:29 AM  
Result Value Ref Range Glucose (POC) 153 (H) 65 - 100 mg/dL PTT Collection Time: 03/17/20  9:32 AM  
Result Value Ref Range aPTT 47.7 (H) 24.3 - 35.4 SEC GLUCOSE, POC Collection Time: 03/17/20 10:39 AM  
Result Value Ref Range Glucose (POC) 128 (H) 65 - 100 mg/dL All Micro Results Procedure Component Value Units Date/Time CULTURE, RESPIRATORY/SPUTUM/BRONCH Elease Hussar STAIN [[id number]]  (Abnormal) Collected:  03/09/20 8025 Order Status:  Completed Specimen:  Sputum Updated:  03/12/20 2197 Special Requests: NO SPECIAL REQUESTS     
  GRAM STAIN 10 TO 50 WBC'S/OIF  
   0 TO 2 EPITHELIAL CELLS/OIF  
   FEW GRAM POSITIVE RODS MODERATE YEAST 4+ MUCUS PRESENT Culture result: MODERATE CANDIDA ALBICANS     
 CULTURE, BLOOD [[id number]] Collected:  03/06/20 0944 Order Status:  Completed Specimen:  Blood Updated:  03/11/20 1509 Special Requests: --     
  RIGHT Antecubital 
  
  Culture result: NO GROWTH 5 DAYS     
 CULTURE, BLOOD [[id number]] Collected:  03/06/20 3681 Order Status:  Completed Specimen:  Blood Updated:  03/11/20 2118 Special Requests: --     
  LEFT Antecubital 
  
  Culture result: NO GROWTH 5 DAYS     
 CULTURE, RESPIRATORY/SPUTUM/BRONCH Elease Hussar STAIN [[id number]] Collected:  03/06/20 1600 Order Status:  Canceled Specimen:  Sputum C. DIFFICILE AG & TOXIN A/B [[id number]] Order Status:  Canceled Specimen:  Stool CULTURE, RESPIRATORY/SPUTUM/BRONCH Mary Juan Carlos [[id number]] Collected:  02/17/20 1220 Order Status:  Completed Specimen:  Sputum from Bronchial Washing Updated:  02/24/20 1244 Special Requests: NO SPECIAL REQUESTS     
  GRAM STAIN 20 TO 35 WBCS SEEN PER OIF  
   1 TO 2 EPITHELIAL CELLS SEEN PER OIF  
      
  MODERATE GRAM POSITIVE COCCI  
     
   FEW GRAM POSITIVE RODS Culture result: LIGHT NORMAL RESPIRATORY BRENNAN Current Meds: 
Current Facility-Administered Medications Medication Dose Route Frequency  warfarin (COUMADIN) tablet 6 mg  6 mg Oral QPM  
 sertraline (ZOLOFT) tablet 100 mg  100 mg Oral DAILY  buPROPion [facility]) tablet 75 mg  75 mg Oral BID  argatroban 50 mg in 0.9% sodium chloride 50 mL (1000 mcg/mL) infusion  0.5-10 mcg/kg/min IntraVENous TITRATE  loratadine (CLARITIN) tablet 10 mg  10 mg Oral DAILY  diphenhydrAMINE (BENADRYL) capsule 25 mg  25 mg Oral Q6H PRN  mirtazapine (REMERON) tablet 15 mg  15 mg Oral QHS  ALPRAZolam (XANAX) tablet 0.5 mg  0.5 mg Oral Q6H PRN  
 furosemide (LASIX) injection 80 mg  80 mg IntraVENous BID  
 bacitracin 500 unit/gram ointment   Topical BID  insulin lispro (HUMALOG) injection   SubCUTAneous AC&HS  
 dextrose 40% (GLUTOSE) oral gel 1 Tube  15 g Oral PRN  
 glucagon (GLUCAGEN) injection 1 mg  1 mg IntraMUSCular PRN  
 dextrose (D50W) injection syrg 12.5-25 g  25-50 mL IntraVENous PRN  
 HYDROcodone-homatropine (HYCODAN) 5-1.5 mg/5 mL (5 mL) syrup 5 mL  5 mL Oral Q4H PRN  
 benzonatate (TESSALON) capsule 100 mg  100 mg Oral TID PRN  
 guaiFENesin ER (MUCINEX) tablet 1,200 mg  1,200 mg Oral Q12H  
 albuterol (PROVENTIL VENTOLIN) nebulizer solution 2.5 mg  2.5 mg Nebulization Q6H RT  
 insulin glargine (LANTUS) injection 5 Units  5 Units SubCUTAneous QHS  sodium chloride (OCEAN) 0.65 % nasal squeeze bottle 2 Spray  2 Spray Both Nostrils Q2H PRN  
 morphine injection 2 mg  2 mg IntraVENous Q6H PRN  
 sodium chloride 3% hypertonic nebulizer soln  4 mL Nebulization BID RT  
 sodium chloride (OCEAN) 0.65 % nasal squeeze bottle 2 Spray  2 Spray Both Nostrils BID  epoetin maritza-epbx (RETACRIT) 14,000 Units combo injection  14,000 Units SubCUTAneous Q7D  
 polyethylene glycol (MIRALAX) packet 17 g  17 g Oral DAILY  midodrine (PROAMITINE) tablet 10 mg  10 mg Oral TID WITH MEALS  
  tamsulosin (FLOMAX) capsule 0.4 mg  0.4 mg Oral QHS  loperamide (IMODIUM) capsule 4 mg  4 mg Oral QID PRN  pantothenic ac-min oil-pet,hyd (AQUAPHOR) 41 % ointment   Topical DAILY  traMADol (ULTRAM) tablet 50 mg  50 mg Oral Q6H PRN  
 sodium chloride (NS) flush 5-40 mL  5-40 mL IntraVENous PRN  
 acetaminophen (TYLENOL) tablet 650 mg  650 mg Oral Q4H PRN  
 naloxone (NARCAN) injection 0.4 mg  0.4 mg IntraVENous PRN  
 ondansetron (ZOFRAN) injection 4 mg  4 mg IntraVENous Q4H PRN  
 bisacodyL (DULCOLAX) tablet 5 mg  5 mg Oral DAILY PRN Other Studies: 
 
Ct Chest W Cont Result Date: 2/14/2020 CTA OF THE CHEST - PE STUDY HISTORY: Acute shortness of breath COMPARISON: None TECHNIQUE: A helical acquisition was performed through the chest utilizing 2.12HK slice thickness during the infusion of 100 cc of Isovue-370. 3-D post-processed images were created on an independent workstation. Multiplanar reformats were obtained. The exam was focused on the pulmonary arteries. Dose reduction techniques used: Automated exposure control, adjustment of the mAs and/or kVp according to patient's size, and iterative reconstruction techniques. FINDINGS: *  PULMONARY VESSELS: No evidence of pulmonary embolism. *  PLEURA / PERICARDIUM: Left pleural effusion. *  CAROL / MEDIASTINUM: Dilated diffuse fluid-filled esophagus. No evidence of a hiatal hernia. *  LUNGS: Consolidation of the entire left lower lobe. Linear atelectasis of the right middle and right upper lobe. *  TRACHEOBRONCHIAL TREE: Within normal limits. *  AORTA: Within normal limits. *  CORONARY ARTERIES: Extensive coronary artery calcification is seen. *  CHEST WALL/AXILLA: Within normal limits. *  VISUALIZED UPPER ABDOMEN: Within normal limits. *  SPINE / BONES: Status post CABG. *  ADDITIONAL COMMENTS: None. IMPRESSION: No evidence of pulmonary embolism.  Left lower lobe atelectasis or pneumonia with a left pleural effusion. Linear atelectasis of the right middle and right upper lobes. Dilated diffuse fluid-filled esophagus. No evidence of a hiatal hernia or obstructing mass. I question of the patient could have gastroesophageal reflux disease. Coronary artery disease status post CABG. Date of Dictation: 2/14/2020 12:25 AM 
 
Xr Chest Rekha Eckert Result Date: 2/13/2020 EXAMINATION: CHEST RADIOGRAPH 2/13/2020 3:55 PM ACCESSION NUMBER: [id number] INDICATION: shob COMPARISON: Chest x-ray 2/8/2020 TECHNIQUE: A single AP view of the chest was obtained. FINDINGS: Support Lines and Tubes: Unchanged cardiac pacemaker positioning. Unchanged left central venous catheter positioning. Cardiac Silhouette: Unchanged enlargement of the cardiac silhouette. Lungs: Unchanged left basilar opacity, moderate left pleural effusion. Improving interstitial edema. Pleura: No pneumothorax. Osseous Structures: Prior median sternotomy. Upper Abdomen: Unremarkable. IMPRESSION: 1. Improving interstitial edema. 2. Unchanged left basilar opacity and left pleural effusion. 3. Unchanged enlargement of the cardiac silhouette. VOICE DICTATED BY: Dr. Leyla Mendez Assessment and Plan:  
 
Hospital Problems as of 3/17/2020 Date Reviewed: 3/9/2020 Codes Class Noted - Resolved POA Acute on chronic respiratory failure with hypoxia [facility]) ICD-10-CM: C33.56 
ICD-9-CM: 518.84, 799.02  3/8/2020 - Present Unknown DNR (do not resuscitate) (Chronic) ICD-10-CM: S55 ICD-9-CM: V49.86  2/26/2020 - Present Yes Acute renal failure on dialysis [facility]) ICD-10-CM: N17.9, Z99.2 ICD-9-CM: 584.9, V45.11  2/25/2020 - Present Yes Hypotension (Chronic) ICD-10-CM: I95.9 ICD-9-CM: 458.9  2/18/2020 - Present Yes Pneumonia due to infectious organism ICD-10-CM: J18.9 ICD-9-CM: 136.9, 484.8  2/14/2020 - Present Unknown  HIT (heparin-induced thrombocytopenia) (HCC) (Chronic) ICD-10-CM: X81.46 
 ICD-9-CM: 289.84  1/23/2020 - Present Yes Atrial fibrillation (HCC) (Chronic) ICD-10-CM: I48.91 
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 
ICD-9-CM: 250.40, 583.81  8/26/2019 - Present Yes Ob[facility] mor[facility] (Chronic) ICD-10-CM: E66.01 
ICD-9-CM: 278.01  10/19/2018 - Present Yes DM type 2 (diabetes mellitus, type 2) (HCC) (Chronic) ICD-10-CM: E11.9 ICD-9-CM: 250.00  1/14/2013 - Present Yes HLD (hyperlipidemia) (Chronic) ICD-10-CM: B28.2 ICD-9-CM: 272.4  1/14/2013 - Present Yes RESOLVED: Acute-on-chronic kidney injury ([facility] ICD-10-CM: N17.9, N18.9 ICD-9-CM: 584.9, 585.9  2/17/2020 - 2/25/2020 Yes RESOLVED: Atelectasis ICD-10-CM: J98.11 ICD-9-CM: 518.0  2/17/2020 - 2/25/2020 Yes RESOLVED: Fluid overload ICD-10-CM: E87.70 ICD-9-CM: 276.69  2/15/2020 - 2/25/2020 Yes RESOLVED: Pleural effusion ICD-10-CM: J90 ICD-9-CM: 511.9  2/3/2020 - 2/25/2020 Yes * (Principal) RESOLVED: Acute hypoxemic respiratory failure ([address] ICD-10-CM: J96.01 
ICD-9-CM: 518.81  1/10/2020 - 2/25/2020 Yes RESOLVED: HTN (hypertension) ICD-10-CM: I10 
ICD-9-CM: 401.9  1/14/2013 - 2/25/2020 Yes Plan: 
Acute hypoxemic respiratory failure with Progressive LLL consolidation - ID on board: back on abx, vanc/cefepime EOT 3/13/2020.  
- S/p bronch 2/17. Pulm following. 
  
LUE pain 
- US : no DVT but Thrombosed superficial branch of the left cephalic vein.  
 
DEJUAN now on HD 
- HD per nephro 
  
DM2 
- sliding scale 
- holding Basal/prandial insulins with sugars reasonable though occasional spikes throughout the day 
  
 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 
-Hold warfarin 
-Start argatroban on INR less than 2.5 to plan for thoracentesis per pulmonology 
  
CAD S/p CABG/AVR January 2020 AFib 
- Home meds. 
  
MDD, passive suicidal ideation: 
-Patient has stated on several times \"doc, just shoot me\". Patient is voiced hopelessness and \"I just want to give up\" to nurse. Patient asking me about physician assisted suicide. 
-Continue bupropion 75 mg twice daily 
-Continue sertraline to 100 mg daily 
-Continue mirtazapine 15 mg every night Diet:  DIET DIABETIC CONSISTENT CARB 
DIET NUTRITIONAL SUPPLEMENTS 
DVT PPx: warfarin Code: DNR Signed By: Tristin Morrissey MD   
 March 17, 2020

## 2020-03-17 NOTE — PROGRESS NOTES
Pt resting in bed with eyes closed. Pt awakens when spoken to. Pt  on 6  l HF NC at this time. Pt denies pain or distress at this time. Wound to left thigh, sacrum and toes. Pt aware of HD run for today. Pt encouraged to call for assistance if needed call light in reach, door open will monitor.

## 2020-03-17 NOTE — PROGRESS NOTES
Nutrition F/U:  
 
Assessment:  
Pt continues to require HD with no signs of recovery. Noted pt being treated for depression. Pt reports appeitte as \"alright\". He says he eats 100% on non HD days and about 50% on HD days. He consistently drinks 1 Nepro a day but declines RD suggestion of increasing to 2 per day on non HD days. Pt confirms UBW/pre-op CABG/ AVR (Januray 10, 2020) of ~306#. Pt reports weight loss most noticeable in legs, arms and face. DIET DIABETIC CONSISTENT CARB Regular DIET NUTRITIONAL SUPPLEMENTS Breakfast; Nepro ( ) Per documentation, patient consuming average 49% of 17 recorded meals in 7 days. With intakeof one Nepro per day, this is inadequate to meet kcal or protein needs. Anthropometrics: 
Edema: Trace generalized, 1+ BUE and BLE Height: 5' 10\" (177.8 cm), Weight: 99.9 kg (220 lb 3.2 oz), Weight Source: Bed, Body mass index is 31.6 kg/m². BMI class of obese. Weight and BMI Weight 3/16/2020 99.882 kg  
3/15/2020 103.964 kg  
3/14/2020 103.964 kg  
3/13/2020 116.484 kg  
3/12/2020 116.075 kg  
3/11/2020 117.754 kg  
3/11/2020 115.667 kg  
3/10/2020 115.667 kg  
3/9/2020 116 kg  
3/8/2020 114 kg  
3/7/2020 117 kg  
3/6/2020 115.168 kg  
3/5/2020 116.62 kg  
3/3/2020 112.265 kg  
3/1/2020 115.2 kg  
2/29/2020 115.2 kg  
2/28/2020 109.453 kg  
2/27/2020 109.68 kg  
2/26/2020 112.311 kg  
2/25/2020 103.465 kg  
2/24/2020 111.812 kg  
2/23/2020 109.453 kg  
2/22/2020 112.311 kg  
2/21/2020 112. 583 kg  
2/20/2020 114.624 kg  
2/19/2020 113.944 kg  
2/18/2020 113.853 kg  
2/17/2020 113.399 kg  
2/17/2020 113.944 kg  
2/16/2020 99.8 kg  
2/15/2020 99.61 kg  
2/13/2020 99.1 kg  
2/12/2020 115.667 kg  
2/11/2020 114.5 kg  
2/10/2020 119.659 kg  
2/9/2020 118.933 kg  
2/8/2020 110.542 kg  
2/7/2020 120.793 kg  
2/6/2020 119.069 kg  
2/5/2020 120.112 kg  
2/4/2020 122. 018 kg  
2/3/2020 128.368 kg  
2/3/2020 128.368 kg  
2/2/2020 127.461 kg  
2/1/2020 130.727 kg  
1/31/2020 134.718 kg 1/30/2020 130.4 kg  
1/29/2020 128.4 kg  
1/28/2020 139.4 kg  
1/27/2020 140.9 kg  
1/26/2020 141.2 kg  
1/25/2020 149.4 kg  
1/24/2020 149.6 kg  
1/23/2020 150.4 kg  
1/22/2020 149.6 kg  
1/21/2020 150.1 kg  
1/20/2020 150.1 kg  
1/19/2020 151.4 kg  
1/18/2020 151.8 kg  
1/17/2020 159.6 kg  
1/16/2020 154 kg  
1/15/2020 155.6 kg  
1/14/2020 153 kg  
1/13/2020 147.4 kg  
1/12/2020 146.3 kg  
1/11/2020 144.7 kg  
1/10/2020 135.988 kg  
1/8/2020 136.646 kg  
12/23/2019 138.801 kg  
12/6/2019 137.168 kg  
11/20/2019 138.801 kg  
11/14/2019 138.801 kg  
10/24/2019 136.533 kg  
8/26/2019 136.261 kg  
2/8/2019 136.896 kg Weight variances likely d/t fluid and scale variances. However pt has had a net 26.5 % change in wt within ~ 3 months c/w severe change. Macronutrient needs: (using IBW (Ideal body weight) 75.5 kg) EER: 5471-3901 kcal/day (25-30 kcal/kg) EPR:  g/day (1.2-1.4 g/kg)-HD and wound Malnutrition Criteria: Severe malnutrition in context of chronic illness Energy Intake: Less than/equal to 75% of est energy req for greater than/equal to 1 month Weight loss: Greater than 7.5% x 3 mos Body fat loss: Mild (seen in Triceps) Muscle mass loss: Mild (seen in Thigh (quadriceps) and Calf (gastrocnemius)) Nutrition Diagnosis: 
Severe malnutrition in context of chronic disease related to inadequate protien-energy intake, increased metabolic needs  as evidenced by intake <75% of needs for > 1 months, weight loss >7.5% in 3 months and dialysis and recent surgery. Nutrition Intervention: 
Meals and snacks: Continue current diet Medical food supplement therapy: Commercial beverage- Continue Nepro once a day and prn from floor stock Discharge Nutrition Plan: Continue Oral Nutrition Supplement (ONS) at discharge. Recommend Nepro or a comparable/similar product Once daily to twice daily on HD days for 30 days unless otherwise directed by your Primary Care Physician. Nasra Valdes, [address], [address] [place] [address], [address] [place] [address]

## 2020-03-17 NOTE — PROGRESS NOTES
Pt resting in bed. Lab into draw INR. Will follow up. Pt remains on 6  l HF NC At this time. Call light in reach, door open will monitor.

## 2020-03-17 NOTE — ROUTINE PROCESS
RN at bedside to assist Nargis Sosa with ultrasound guided thoracentesis. Consent obtained. Patient sat up on the side of the bed. Procedure explained to patient by MD and patient verbalized understanding. Timeout completed with patient identification. Ultrasound completed bilaterally. Not enough fluid to drain.

## 2020-03-17 NOTE — PROGRESS NOTES
Pt sitting up in bed. Pt on 6  l HF NC at this time. No acute distress noted at this time. Call light in reach, door open will monitor.

## 2020-03-17 NOTE — PROGRESS NOTES
[facility] PROGRESS NOTE Follow up for: DEJUAN/CKD Subjective:  
Patient seen and examined on HD, dialyzing via left  Qb, UF 2600, tolerating UF, SOB waxes and wanes with anxiety, comfortable now. ROS: 
Gen - no fever, no chills, appetite okay CV - no chest pain, no orthopnea Lung - no shortness of breath, no cough GI- no N/V/D Ext/Access - no edema. Left side TCC Objective:  
Exam: 
Vitals:  
 03/17/20 0340 03/17/20 0728 03/17/20 0812 03/17/20 0830 BP: 93/54 147/76 132/70 137/79 Pulse: 86 80 85 82 Resp: 22 21 Temp: 98.2 °F (36.8 °C) 97.7 °F (36.5 °C) SpO2: 97% 93% Weight:      
Height:      
 
 
 
Intake/Output Summary (Last 24 hours) at 3/17/2020 2316 Last data filed at 3/17/2020 0110 Gross per 24 hour Intake 480 ml Output 750 ml Net -270 ml  
 
 
Current Facility-Administered Medications Medication Dose Route Frequency  sertraline (ZOLOFT) tablet 100 mg  100 mg Oral DAILY  buPROPion [facility]) tablet 75 mg  75 mg Oral BID  argatroban 50 mg in 0.9% sodium chloride 50 mL (1000 mcg/mL) infusion  0.5-10 mcg/kg/min IntraVENous TITRATE  loratadine (CLARITIN) tablet 10 mg  10 mg Oral DAILY  diphenhydrAMINE (BENADRYL) capsule 25 mg  25 mg Oral Q6H PRN  
 WARFARIN ON HOLD    Other Rx Dosing/Monitoring  mirtazapine (REMERON) tablet 15 mg  15 mg Oral QHS  ALPRAZolam (XANAX) tablet 0.5 mg  0.5 mg Oral Q6H PRN  
 furosemide (LASIX) injection 80 mg  80 mg IntraVENous BID  
 bacitracin 500 unit/gram ointment   Topical BID  insulin lispro (HUMALOG) injection   SubCUTAneous AC&HS  
 dextrose 40% (GLUTOSE) oral gel 1 Tube  15 g Oral PRN  
 glucagon (GLUCAGEN) injection 1 mg  1 mg IntraMUSCular PRN  
 dextrose (D50W) injection syrg 12.5-25 g  25-50 mL IntraVENous PRN  
 HYDROcodone-homatropine (HYCODAN) 5-1.5 mg/5 mL (5 mL) syrup 5 mL  5 mL Oral Q4H PRN  
 benzonatate (TESSALON) capsule 100 mg  100 mg Oral TID PRN  
  guaiFENesin ER (MUCINEX) tablet 1,200 mg  1,200 mg Oral Q12H  
 albuterol (PROVENTIL VENTOLIN) nebulizer solution 2.5 mg  2.5 mg Nebulization Q6H RT  
 insulin glargine (LANTUS) injection 5 Units  5 Units SubCUTAneous QHS  sodium chloride (OCEAN) 0.65 % nasal squeeze bottle 2 Spray  2 Spray Both Nostrils Q2H PRN  
 morphine injection 2 mg  2 mg IntraVENous Q6H PRN  
 sodium chloride 3% hypertonic nebulizer soln  4 mL Nebulization BID RT  
 sodium chloride (OCEAN) 0.65 % nasal squeeze bottle 2 Spray  2 Spray Both Nostrils BID  epoetin maritza-epbx (RETACRIT) 14,000 Units combo injection  14,000 Units SubCUTAneous Q7D  
 polyethylene glycol (MIRALAX) packet 17 g  17 g Oral DAILY  midodrine (PROAMITINE) tablet 10 mg  10 mg Oral TID WITH MEALS  tamsulosin (FLOMAX) capsule 0.4 mg  0.4 mg Oral QHS  loperamide (IMODIUM) capsule 4 mg  4 mg Oral QID PRN  pantothenic ac-min oil-pet,hyd (AQUAPHOR) 41 % ointment   Topical DAILY  traMADol (ULTRAM) tablet 50 mg  50 mg Oral Q6H PRN  
 sodium chloride (NS) flush 5-40 mL  5-40 mL IntraVENous PRN  
 acetaminophen (TYLENOL) tablet 650 mg  650 mg Oral Q4H PRN  
 naloxone (NARCAN) injection 0.4 mg  0.4 mg IntraVENous PRN  
 ondansetron (ZOFRAN) injection 4 mg  4 mg IntraVENous Q4H PRN  
 bisacodyL (DULCOLAX) tablet 5 mg  5 mg Oral DAILY PRN  
 
 
EXAM 
GEN - Alert, oriented, in no distress CV - S1, S2, RRR, no rub, murmur, or gallop Lung - clear to auscultation bilaterally Abd - soft, nontender, BS present Ext/ Access - no edema, Left TCC- intact No results for input(s): WBC, HGB, HCT, PLT, HGBEXT, HCTEXT, PLTEXT, HGBEXT, HCTEXT, PLTEXT in the last 72 hours. Recent Labs  
  03/17/20 
0680 03/16/20 
0704 03/15/20 
1553 * 134* 136  
K 3.7 4.0 3.8 CL 99 100 101 CO2 26 28 29 BUN 49* 29* 15  
CREA 5.11* 4.27* 2.81* CA 8.5 8.3 8.8 * 195* 139* Assessment and Plan: 1) DEJUAN on CKD-  No sign of recovery. HD dependent since Jan, '20- likely ESRD 
-seen on HD, tolerating UF, catheter functioning well 2) Hypotension-  On midodrine prior to dialysis 3)  Hypoxic respiratory failure- pulmonary following. 3L O2 
 
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

## 2020-03-17 NOTE — PROGRESS NOTES
Problem: Falls - Risk of 
Goal: *Absence of Falls Description: Document Anurag Bernardo Fall Risk and appropriate interventions in the flowsheet. Outcome: Progressing Towards Goal 
Note: Fall Risk Interventions: 
Mobility Interventions: Patient to call before getting OOB Mentation Interventions: Adequate sleep, hydration, pain control Medication Interventions: Teach patient to arise slowly Elimination Interventions: Call light in reach History of Falls Interventions: Door open when patient unattended

## 2020-03-17 NOTE — INTERDISCIPLINARY ROUNDS
Interdisciplinary team rounds were held 3/17/2020 with the following team members:Care Management, Physical Therapy, Physician and Clinical Coordinator and the patient. Plan of care discussed. See clinical pathway and/or care plan for interventions and desired outcomes.

## 2020-03-17 NOTE — PROGRESS NOTES
Warfarin dosing per pharmacist 
 
Jim Gómez is a 69 y.o. male. Height: 5' 10\" (177.8 cm)   Weight 103.5 kg (228 lbs) Indication:  AVR and afib Goal INR:  2.5 - 3.5 Home dose:  2.5 mg daily Risk factors or significant drug interactions: HIT+(SHARON positive on 1/18/20), amiodarone (dc'd 2/5), Levofloxacin (2/13- 2/20 ), mirtazapine (2/13-2/24), escitalopram (2/19-2/20), trazodone (started 2/24) Other anticoagulants: Argatroban Daily Monitoring Date  INR     Warfarin dose HGB              Notes 2/14  2.4  3 mg  8.7 2/15  2.2  4 mg  9.6 2/16  2.1  5 mg  9.8 2/17  2.1  5 mg  9.5 2/18  2.8  2 mg  9.3 2/19  2.5  2 mg  8.7 
2/20  1.9  3 mg   8.8 
2/21  1.9  4 mg  8.6 
2/22  2.1  3 mg  8.5 
2/23  1.9  4 mg  --- 
2/24  1.7  3 mg + 2 mg --- 
2/25  1.7  5 mg  --- 
2/26  1.6  6 mg  --- 
2/27  1.7  6 mg   --- 
2/28  1.7  7.5 mg  --- 
2/29  2.1 ` 6 mg  7.7 
3/1  2.0  7.5 mg  --- 
3/2  2.9  6 mg  --- 
3/3  3.4  5 mg  8.0 
3/4  2.7  7.5mg  8.6 
3/5  2.9  6 mg  8.0 
3/6  3.1  Held  8.4 
3/7  2.6  6 mg  8.0 
3/8  2.7  6mg  --- 
3/9  2.5  6 mg  --- 
3/10  2.7  6 mg  7.6 
3/11  3.3  5 mg  8.3 
3/12  4.3  Hold  8.2 
3/13  3.9  Hold  8.8 
3/14  3.5  Hold  --- 
3/15  2.8  Hold  --- 
3/16  2.5  Hold  --- 
3/17  3.1  6 mg  --- On argatroban Pulmonary considering thoracentesis next week. Argatroban bridge on hold currently for thoracentesis today. Argatroban and warfarin to be restarted later today. Will order 6 mg of warfarin today. Repeat INR off argatroban today was 2.0. Daily INR. Thank you, Junito Kaiser, PharmD, [facility] Special[facility] 
[phone]

## 2020-03-17 NOTE — PROGRESS NOTES
TRANSFER IN - DIALYSIS Received patient in dialysis unit  from [facility] (unit) for ordered procedure. Consent verified for renal replacement therapy. Patient alert and oriented and vital signs stable. /70 P85 Hemodialysis initiated using left catheter. Aspirated and flushed both ports without difficulty. Dressing clean, dry and intact. Machine settings per MD order. Will monitor during treatment.

## 2020-03-17 NOTE — PROGRESS NOTES
03/16/20 2223 Oxygen Therapy O2 Sat (%) 97 % Pulse via Oximetry 80 beats per minute O2 Device CPAP mask FIO2 (%) 35 % Respiratory Respiratory (WDL) WDL Respiratory Pattern Regular Chest/Tracheal Assessment Chest expansion, symmetrical  
Breath Sounds Bilateral Coarse Cough Non-productive CPAP/BIPAP  
CPAP/BIPAP Start/Stop On Device Mode CPAP  
$$ CPAP Daily Yes Bio[employer] ID # [id number] Mask Type and Size Full face Skin Condition intact PIP Observed 9 cm H20  
EPAP (cm H2O) 8 cm H2O Vt Spont (ml) 335 ml Ve Observed (l/min) 7 l/min Backup Rate 10 Total RR (Spontaneous) 21 breaths per minute Insp Rise Time (sec) 2 Leak (Estimated) 29 L/min  
Pt's Home Machine No  
Biomedical Check Performed Yes Settings Verified Yes Alarm Settings High Pressure 30 Low Pressure 2 Apnea 20 Low Ve 3 High Rate 50 Low Rate 10 Pulmonary Toilet Pulmonary Toilet H. O.B elevated

## 2020-03-17 NOTE — DIALYSIS
TRANSFER OUT- DIALYSIS Hemodialysis treatment completed without complications. Patient alert and VS stable  /69  P 82   
 
 2 Kgs removed. Flushed both ports with 10 mL of NS.  CVC dressing clean, dry, and intact, tego caps intact, bilateral lumens wrapped with 4x4 gauze. Patient to room 23-14-20-09 after dialysis.

## 2020-03-17 NOTE — PROGRESS NOTES
Pt arrived back to room HD. Pt denies pain or distress at this time. Argatroban gtt stopped per Order from Dr. Kay Bangura for possible thoracentesis. Dressing to left perm cath clean, dry, intact at this time. Pt encouraged to call for assistance if needed call light in reach, door open will monitor.

## 2020-03-17 NOTE — PROGRESS NOTES
Martha Mendoza Admission Date: 2/13/2020 Daily Progress Note: 3/17/2020 The patient's chart is reviewed and the patient is discussed with the staff. Lemuel Sparks Kameron a history of CAD s/p CABG with AVR 1/10/20 by Dr. Francisca Oneil complicated by CKD requiring HD, HIT +, DVTs, wound left thigh and pneumonia.  Was discharged on Levaquin Q 48 hours last dose 2/11/20. Mikayla See was discharged on NC 2 L but has been increased to NC 4L O2 at [facility]. He presented to the ER on 2/15 with increased SOB. CTA chest was negative for PE but persistent LLL opacity. He had a bronch 2/17 with negative cultures and was treated for PNA using levaquin. He is now ESRD and being followed by nephrology.  
  
  We were reconsulted on 3/6 for acute respiratory failure requiring bipap. CXR shows increased consolidation in the left base. He is febrile with a temp of 100.5. He has refused a swallow evaluation. ID is following and he was started on Vanc and Cefepime on 3/6. Subjective:  
 
Patient currently on 5L O2. US performed of left chest today without any visible pleural effusion. Had HD today. Ongoing weakness and notable orthostatic hypotension. Current Facility-Administered Medications Medication Dose Route Frequency  warfarin (COUMADIN) tablet 6 mg  6 mg Oral QPM  
 sertraline (ZOLOFT) tablet 100 mg  100 mg Oral DAILY  buPROPion [facility]) tablet 75 mg  75 mg Oral BID  argatroban 50 mg in 0.9% sodium chloride 50 mL (1000 mcg/mL) infusion  0.5-10 mcg/kg/min IntraVENous TITRATE  loratadine (CLARITIN) tablet 10 mg  10 mg Oral DAILY  diphenhydrAMINE (BENADRYL) capsule 25 mg  25 mg Oral Q6H PRN  mirtazapine (REMERON) tablet 15 mg  15 mg Oral QHS  ALPRAZolam (XANAX) tablet 0.5 mg  0.5 mg Oral Q6H PRN  
 furosemide (LASIX) injection 80 mg  80 mg IntraVENous BID  
 bacitracin 500 unit/gram ointment   Topical BID  insulin lispro (HUMALOG) injection   SubCUTAneous AC&HS  
  dextrose 40% (GLUTOSE) oral gel 1 Tube  15 g Oral PRN  
 glucagon (GLUCAGEN) injection 1 mg  1 mg IntraMUSCular PRN  
 dextrose (D50W) injection syrg 12.5-25 g  25-50 mL IntraVENous PRN  
 HYDROcodone-homatropine (HYCODAN) 5-1.5 mg/5 mL (5 mL) syrup 5 mL  5 mL Oral Q4H PRN  
 benzonatate (TESSALON) capsule 100 mg  100 mg Oral TID PRN  
 guaiFENesin ER (MUCINEX) tablet 1,200 mg  1,200 mg Oral Q12H  
 albuterol (PROVENTIL VENTOLIN) nebulizer solution 2.5 mg  2.5 mg Nebulization Q6H RT  
 insulin glargine (LANTUS) injection 5 Units  5 Units SubCUTAneous QHS  sodium chloride (OCEAN) 0.65 % nasal squeeze bottle 2 Spray  2 Spray Both Nostrils Q2H PRN  
 morphine injection 2 mg  2 mg IntraVENous Q6H PRN  
 sodium chloride 3% hypertonic nebulizer soln  4 mL Nebulization BID RT  
 sodium chloride (OCEAN) 0.65 % nasal squeeze bottle 2 Spray  2 Spray Both Nostrils BID  epoetin maritza-epbx (RETACRIT) 14,000 Units combo injection  14,000 Units SubCUTAneous Q7D  
 polyethylene glycol (MIRALAX) packet 17 g  17 g Oral DAILY  midodrine (PROAMITINE) tablet 10 mg  10 mg Oral TID WITH MEALS  tamsulosin (FLOMAX) capsule 0.4 mg  0.4 mg Oral QHS  loperamide (IMODIUM) capsule 4 mg  4 mg Oral QID PRN  pantothenic ac-min oil-pet,hyd (AQUAPHOR) 41 % ointment   Topical DAILY  traMADol (ULTRAM) tablet 50 mg  50 mg Oral Q6H PRN  
 sodium chloride (NS) flush 5-40 mL  5-40 mL IntraVENous PRN  
 acetaminophen (TYLENOL) tablet 650 mg  650 mg Oral Q4H PRN  
 naloxone (NARCAN) injection 0.4 mg  0.4 mg IntraVENous PRN  
 ondansetron (ZOFRAN) injection 4 mg  4 mg IntraVENous Q4H PRN  
 bisacodyL (DULCOLAX) tablet 5 mg  5 mg Oral DAILY PRN Review of Systems Constitutional: negative for fever, chills, sweats Cardiovascular: negative for chest pain, palpitations, syncope, edema Gastrointestinal: negative for dysphagia, reflux, vomiting, diarrhea, abdominal pain, or melena Neurologic: + generalized weakness. negative for focal weakness, numbness, headache Objective:  
 
Vitals:  
 03/17/20 1232 03/17/20 1432 03/17/20 1508 03/17/20 1539 BP: 134/68  133/89 (!) 85/55 Pulse: 86  (!) 57 97 Resp: 19 19 22 Temp: 97.4 °F (36.3 °C)  97.6 °F (36.4 °C) SpO2: 96% 95% 90% 97% Weight:      
Height:      
 
Intake and Output:  
03/15 1901 - 03/17 0700 In: 580 [P.O.:580] Out: 860 [Urine:860] 03/17 0701 - 03/17 1900 In: 240 [P.O.:240] Out: 2000 Physical Exam:  
Constitution:  the patient is well developed and in no acute distress EENMT:  Sclera clear, pupils equal, oral mucosa moist 
Respiratory: CTA B in anterior lung fields. Decreased at B bases Cardiovascular:  RRR without M,G,R 
Gastrointestinal: soft and non-tender; with positive bowel sounds. Musculoskeletal: warm without cyanosis. There is no lower leg edema. Skin:  no jaundice or rashes, no wounds Neurologic: no gross neuro deficits Psychiatric:  alert and oriented x ppt CHEST XRAY:  
3/17/20 IMPRESSION: Stable bibasilar infiltrates, left greater than right LAB No lab exists for component: [place] Recent Labs  
  03/17/20 
1456 03/17/20 
0339 03/16/20 
8897 INR 2.0 3.1 2.5 Recent Labs  
  03/17/20 
3104 03/16/20 
1135 03/16/20 
0704 03/15/20 
1187 *  --  134* 136  
K 3.7  --  4.0 3.8 CL 99  --  100 101 CO2 26  --  28 29 *  --  195* 139* BUN 49*  --  29* 15  
CREA 5.11*  --  4.27* 2.81* CA 8.5  --  8.3 8.8 ALB  --  2.2*  --   --   
SGOT  --  32  --   --   
 
ABG:  No results found for: PH, PHI, PCO2, PCO2I, PO2, PO2I, HCO3, HCO3I, FIO2, FIO2I Assessment:  (Medical Decision Making) Hospital Problems  Date Reviewed: 3/9/2020 Codes Class Noted POA Acute on chronic respiratory failure with hypoxia [facility]) ICD-10-CM: J96.21 
ICD-9-CM: 518.84, 799.02  3/8/2020 Unknown  DNR (do not resuscitate) (Chronic) ICD-10-CM: I58 
 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 
ICD-9-CM: 250.40, 583.81  8/26/2019 Yes Obesity, morbid ([address] (Chronic) ICD-10-CM: E66.01 
ICD-9-CM: 278.01  10/19/2018 Yes DM type 2 (diabetes mellitus, type 2) (HCC) (Chronic) ICD-10-CM: E11.9 ICD-9-CM: 250.00  1/14/2013 Yes HLD (hyperlipidemia) (Chronic) ICD-10-CM: Q51.6 ICD-9-CM: 272.4  1/14/2013 Yes Pt with recurrent respiratory failure. Feel like much of this is due to atelectasis/mucus plugging related to generalized weakness and debility. Does not appear to be related to pleural effusion. May be component of pulmonary edema with renal failure. CXR somewhat suggestive of pulmonary edema. With orthostatic hypotension feel like repeating his echo to check his current cardiac function is warranted. Plan:  (Medical Decision Making) -repeat TTE now.  
-cont efforts to minimize atelectasis including vibralung, hypertonic saline nebs, and will add IS.  
-cont to wean o2 as sats allow. Continue albuterol.  
-cont lasix.   
-wonder if patient needs further workup for other causes of orthostatic hypotension such as parkinson's, etc. 
 -restart argatroban vs coumadin. No additional procedures planned from pulmonary at this time.   
 
 
Mell Borjas MD

## 2020-03-17 NOTE — DISCHARGE INSTRUCTIONS
NUTRITION       Continue Oral Nutrition Supplement (ONS) at discharge. Recommend Nepro or a comparable/similar product at  Once daily to twice daily on hemodialysis days for 30 days unless otherwise directed by your Primary Care Physician.      Tommy Clark RD, LD

## 2020-03-18 NOTE — PROGRESS NOTES
Pt resting in bed with eyes closed. No distress noted at this time. Pt on 4 L HF NC at this time. Call light in reach, door open will monitor.

## 2020-03-18 NOTE — PROGRESS NOTES
Dressing to BLE changed per order. Pt tolerated well. Pt sitting up in chair. Call light in reach, door open will monitor.

## 2020-03-18 NOTE — PROGRESS NOTES
Problem: Falls - Risk of 
Goal: *Absence of Falls Description: Document Giuliana Latin Fall Risk and appropriate interventions in the flowsheet. Outcome: Progressing Towards Goal 
Note: Fall Risk Interventions: 
Mobility Interventions: Patient to call before getting OOB Mentation Interventions: Adequate sleep, hydration, pain control Medication Interventions: Teach patient to arise slowly Elimination Interventions: Call light in reach History of Falls Interventions: Door open when patient unattended

## 2020-03-18 NOTE — PROGRESS NOTES
Problem: Self Care Deficits Care Plan (Adult) Goal: *Acute Goals and Plan of Care (Insert Text) Description Goals per re-evaluation on 3/10/2020: 
1. Patient will complete lower body bathing and dressing with Min A and adaptive equipment as needed. 2. Patient will complete toileting with Min A and adaptive equipment as needed. 3. Patient will complete bed mobility from supine to sit with CGA and one verbal cue for placement of UE to assist. PROGRESSING 3/18/2020 4. Patient will complete functional transfer from sit to stand with SBA and use of adaptive equipment as needed. PROGRESSING 3/18/2020 5. Patient will complete grooming standing at sink level with SBA and use of adaptive equipment as needed. PROGRESSING 3/18/2020 6. Patient will perform pursed-lip breathing with one verbal and one visual to increase activity tolerance for performance of seated and standing ADL tasks. PROGRESSING 3/18/2020 Timeframe: 7 visits Outcome: Progressing Towards Goal 
 
OCCUPATIONAL THERAPY: Daily Note and AM  
 3/18/2020 INPATIENT: OT Visit Days: 2 Payor: Alexander Melchor / Plan: Hima Melchor / Product Type: [employer] Medicare /  
  
NAME/AGE/GENDER: Yariel Maher is a 69 y.o. male PRIMARY DIAGNOSIS:  Acute respiratory failure ([facility] [J96.00] Fluid overload [E87.70] Acute hypoxemic respiratory failure (HCC) Acute hypoxemic respiratory failure (HCC) Procedure(s) (LRB): ULTRASOUND (Bilateral) 1 Day Post-Op ICD-10: Treatment Diagnosis:  
 · Generalized Muscle Weakness (M62.81) · Difficulty in walking, Not elsewhere classified (R26.2) · History of falling (Z91.81) Precautions/Allergies: 
   Heparin; Amoxicillin; Keflex [cephalexin]; and Pcn [penicillins] ASSESSMENT:  
 
Mr. Roxanne Ridley  is a 69 y.o. male admitted from [address] [place] [address] (s/p CABG) with acute respiratory failure. Hx SI.  At baseline lives with wife and reports independence to modified independence ADLs, IADLs, and ambulation without AD. Has had falls during this hospital admission. 3/18/2020: Pt found supine in bed upon arrival, alert and agreeable to OT/PT co-treatment, on 4.5L O2 HFNC. O2 sats stable throughout session. Pt with OH, practiced BLE exercises in supine in preparation for positional changes for ADLs. /75 in supine post activity. Pt practiced bed mobility with Elo/cueing, fair go good sitting balance, pt prop sitting when dizzy. /52. Pt practiced several functional transfer trials with Elo progressing to CGA, frequent cueing throughout for hand placement to increase safety. Pt practiced toileting with SBA for melo hygiene in sitting, total assist for bowel hygiene/clothing management in standing, requiring several standing trials to complete due to dizziness/low standing tolerance. Pt practiced grooming in sitting with SBA. Frequent rest breaks throughout, cues for breathing technique. Pt left seated in chair with call bell within reach. Pt is making progress towards goals. See above. Continue OT POC. This patient is appropriate for co-treatment at this time due to multiple deficits including dizziness, decreased balance, decreased cognition/safety awareness, decreased endurance/activity tolerance, decreased strength, and need for high level assistance to complete functional transfers and functional tasks. This section established at most recent assessment PROBLEM LIST (Impairments causing functional limitations): 1. Decreased Strength 2. Decreased ADL/Functional Activities 3. Decreased Transfer Abilities 4. Decreased Ambulation Ability/Technique 5. Decreased Balance 6. Increased Pain 7. Decreased Activity Tolerance 8. Decreased Pacing Skills 9. Increased Fatigue 10. Increased Shortness of Breath 11. Decreased Skin Integrity/Hygeine  INTERVENTIONS PLANNED: (Benefits and precautions of occupational therapy have been discussed with the patient.) 1. Activities of daily living training 2. Adaptive equipment training 3. Balance training 4. Clothing management 5. Hygiene training 6. Neuromuscular re-eduation 7. Re-evaluation 8. Therapeutic activity 9. Therapeutic exercise TREATMENT PLAN: Frequency/Duration: Follow patient 3x/week to address above goals. Rehabilitation Potential For Stated Goals: Fair REHAB RECOMMENDATIONS (at time of discharge pending progress):   
Placement: It is my opinion, based on this patient's performance to date, that Mr. Iliana Ross may benefit from intensive therapy at a [address] [place] [address] after discharge due to the functional deficits listed above that are likely to improve with skilled rehabilitation and concerns that he/she may be unsafe to be unsupervised at home due to decreased independence, safety, balance,and activity tolerance. .  
Equipment: ? TBD   
    
 
 
 
OCCUPATIONAL PROFILE AND HISTORY:  
History of Present Injury/Illness (Reason for Referral): 
See H & P Past Medical History/Comorbidities:  
Mr. Iliana Ross  has a past medical history of Arthritis, BPH (benign prostatic hyperplasia) (1/14/2013), CAD (coronary artery disease), DM type 2 (diabetes mellitus, type 2) ([facility] (dx 2004), Dyspnea, Gout (1/14/2013), HLD (hyperlipidemia) (1/14/2013), HTN (hypertension), Morbid obesity ([facility] (9/3/14), Psychiatric disorder, Rheumatic fever, Seasonal allergic rhinitis, Severe aortic valve stenosis, Thyroid disease, and Unspecified sleep apnea (2016). Mr. Iliana Ross  has a past surgical history that includes hx tonsil and adenoidectomy; hx heart catheterization (12/23/2019); hx orthopaedic (Left); hx cataract removal (Bilateral, 2012); and ir insert tunl cvc w port over 5 years (2/4/2020). Social History/Living Environment:  
Home Environment: Private residence # Steps to Enter: 2 Rails to Enter: No 
One/Two Story Residence: One story Living Alone: No 
 Support Systems: Spouse/Significant Other/Partner Patient Expects to be Discharged to[de-identified] Rehabilitation facility Current DME Used/Available at Home: Cane, straight, Walker, rolling Tub or Shower Type: Shower Prior Level of Function/Work/Activity: At baseline, patient lives with wife in one story home with 2 ZACHARIAH. Wife home and available 24/7 if needed. Pt is typically Mod I to independent for performance of ADLs and IADLs. Pt typically uses no DME for in home or community mobility, but does have [facility] available if needed. Reports that he is able to walk approximately 200 feet without any DME before having to stop to take a rest break due to becoming SOB. Pt reports that he has multiple falls during acute care stay and vividly describes a fall he had off the bedside commode. Personal Factors:   
      Sex:  male Age:  69 y.o. Number of Personal Factors/Comorbidities that affect the Plan of Care: Extensive review of physical, cognitive, and psychosocial performance (3+):  HIGH COMPLEXITY ASSESSMENT OF OCCUPATIONAL PERFORMANCE[de-identified]  
Activities of Daily Living:  
Basic ADLs (From Assessment) Complex ADLs (From Assessment) Feeding: Minimum assistance Oral Facial Hygiene/Grooming: Minimum assistance Bathing: Moderate assistance Upper Body Dressing: Minimum assistance Lower Body Dressing: Moderate assistance Toileting: Moderate assistance Instrumental ADL Meal Preparation: Total assistance Homemaking: Total assistance Grooming/Bathing/Dressing Activities of Daily Living Grooming Position Performed: Seated in chair Washing Face: Stand-by assistance Washing Hands: Stand-by assistance Brushing Teeth: Stand-by assistance Toileting Bladder Hygiene: Set-up Bowel Hygiene: Total assistance (dependent) Clothing Management: Total assistance (dependent) Adaptive Equipment: Independence Moulds Bed/Mat Mobility Supine to Sit: Minimum assistance Sit to Supine: Contact guard assistance Sit to Stand: Contact guard assistance Stand to Sit: Contact guard assistance Bed to Chair: Contact guard assistance;Assist x2 Scooting: Contact guard assistance Most Recent Physical Functioning:  
Gross Assessment: 
  
         
  
Posture: 
Posture (WDL): Exceptions to [facility] Posture Assessment: Forward head, Rounded shoulders, Trunk flexion Balance: 
Sitting: Impaired Sitting - Static: Good (unsupported) Sitting - Dynamic: Fair (occasional) Standing: Impaired Standing - Static: Fair;Constant support Standing - Dynamic : Fair;Constant support Bed Mobility: 
Supine to Sit: Minimum assistance Sit to Supine: Contact guard assistance Scooting: Contact guard assistance Interventions: Safety awareness training;Verbal cues Wheelchair Mobility: 
  
Transfers: 
Sit to Stand: Contact guard assistance Stand to Sit: Contact guard assistance Bed to Chair: Contact guard assistance;Assist x2 Interventions: Verbal cues; Safety awareness training Patient Vitals for the past 6 hrs: 
 BP BP Patient Position SpO2 O2 Flow Rate (L/min) Pulse 03/18/20 0839   95 % 5 l/min   
03/18/20 1057 165/78  94 %  79  
03/18/20 1101 140/75 Supine 94 % 4.5 l/min   
03/18/20 1341   95 %   Mental Status Neurologic State: Alert Orientation Level: Oriented X4 Cognition: Follows commands Perception: Appears intact Perseveration: No perseveration noted Safety/Judgement: Fall prevention Physical Skills Involved: 
1. Balance 2. Strength 3. Activity Tolerance 4. Fine Motor Control 5. Vision 6. Pain (acute) 7. Skin Integrity Cognitive Skills Affected (resulting in the inability to perform in a timely and safe manner): 1. Perception 2. Sustained Attention 3. Divided Attention Psychosocial Skills Affected: 1. Habits/Routines 2. Environmental Adaptation 3. Social Interaction Number of elements that affect the Plan of Care: 5+:  HIGH COMPLEXITY CLINICAL DECISION MAKING:  
 [facility] Daily Activity Inpatient Short Form How much help from another person does the patient currently need. .. Total A Lot A Little None 1. Putting on and taking off regular lower body clothing? [] 1   [x] 2   [] 3   [] 4  
2. Bathing (including washing, rinsing, drying)? [] 1   [x] 2   [] 3   [] 4  
3. Toileting, which includes using toilet, bedpan or urinal?   [] 1   [x] 2   [] 3   [] 4  
4. Putting on and taking off regular upper body clothing? [] 1   [] 2   [x] 3   [] 4  
5. Taking care of personal grooming such as brushing teeth? [] 1   [] 2   [x] 3   [] 4  
6. Eating meals? [] 1   [] 2   [x] 3   [] 4  
© 2007, Trustees of [address] [place] [address] Box Replaced by [facility], under license to [employer]. All rights reserved Score:  Initial: 15, completed, 2/15/2020 Most Recent: 15 completed, (Date: 3/10/2020) Interpretation of Tool:  Represents activities that are increasingly more difficult (i.e. Bed mobility, Transfers, Gait). Medical Necessity:    
· Skilled intervention continues to be required due to decreased independence, safety, balance, and activity tolerance. In addition, pt having increased dizziness that is limiting pt ability to perform ADLs and functional mobility. Howard Chu Reason for Services/Other Comments: 
· Patient continues to require skilled intervention due to medical complications and patient unable to attend/participate in therapy as expected. Use of outcome tool(s) and clinical judgement create a POC that gives a: MODERATE COMPLEXITY  
 
 
 
TREATMENT:  
(In addition to Assessment/Re-Assessment sessions the following treatments were rendered) Pre-treatment Symptoms/Complaints:   
Pain: Initial:  
Pain Intensity 1:0 Post Session:  Unchanged Today's treatment session addressed Decreased Strength, Decreased ADL/Functional Activities, Decreased Transfer Abilities, Decreased Ambulation Ability/Technique, Decreased Balance, Decreased Activity Tolerance, Decreased Pacing Skills, Decreased Work Simplification/Energy Conservation Techniques, Increased Fatigue, Increased Shortness of Breath, Decreased Skin Integrity/Hygeine, Decreased [place] with Home Exercise Program and Decreased Cognition to progress towards achieving goal(s). During this session,  Physical Therapy addressed  Functional Transfers to progress towards their discipline specific goal(s). Co-treatment was necessary to improve patient's cognitive participation, ability to follow higher level commands, ability to increase activity demands and ability to return to normal functional activity. Self Care: (44 minutes): Procedure(s) (per grid) utilized to improve and/or restore self-care/home management as related to toileting and grooming. Required minimal to maximal visual, verbal, manual and tactile cueing to facilitate activities of daily living skills and compensatory activities. Pt with OH, practiced BLE exercises in supine in preparation for positional changes for ADLs. /75 in supine post activity. Pt practiced bed mobility with Elo/cueing, fair go good sitting balance, pt prop sitting when dizzy. /52. Pt practiced several functional transfer trials with Elo progressing to CGA, frequent cueing throughout for hand placement to increase safety. Pt practiced toileting with SBA for melo hygiene in sitting, total assist for bowel hygiene/clothing management in standing, requiring several standing trials to complete due to dizziness/low standing tolerance. Pt practiced grooming in sitting with SBA. Frequent rest breaks throughout, cues for breathing technique. Braces/Orthotics/Lines/Etc:  
· IV 
· O2 Device: Nasal cannula( High Flow) · Treatment/Session Assessment:   
Response to Treatment: Decreased tolerance, good participation · Interdisciplinary Collaboration:  
o Physical Therapy Assistant 
o Occupational Therapist 
o Registered Nurse · After treatment position/precautions:  
o Up in chair 
o Bed/Chair-wheels locked 
o Bed in low position 
o Call light within reach 
o RN notified 
o Aamir Durant elevated · Compliance with Program/Exercises: Compliant most of the time, Will assess as treatment progresses. · Recommendations/Intent for next treatment session: \"Next visit will focus on advancements to more challenging activities and reduction in assistance provided\". Total Treatment Duration: OT Patient Time In/Time Out Time In: 1100 Time Out: 1144 Danielle Yadav OTR/L

## 2020-03-18 NOTE — INTERDISCIPLINARY ROUNDS
Interdisciplinary team rounds were held 3/18/2020 with the following team members:Care Management, Physical Therapy, Physician and Clinical Coordinator and the patient. Plan of care discussed. See clinical pathway and/or care plan for interventions and desired outcomes.

## 2020-03-18 NOTE — PROGRESS NOTES
Pt resting in bed with eyes closed. Pt awakens when spoken to. Pt  on 5 l HF NC at this time. Pt denies pain or distress at this time. Wound to  sacrum and toes.   Pt encouraged to call for assistance if needed call light in reach, door open will monitor.

## 2020-03-18 NOTE — PROGRESS NOTES
Problem: Mobility Impaired (Adult and Pediatric) Goal: *Acute Goals and Plan of Care (Insert Text) Description LTG: (Reviewed and updated 3/13/20) (1.)Mr. Clinton Smiley will move from supine to sit and sit to supine , scoot up and down and roll side to side INDEPENDENTLY within 7 treatment day(s) from flat surface. (2.)Mr. Clinton Smiley will transfer from bed to chair and chair to bed with STAND BY ASSIST using the least restrictive device within 7 treatment day(s). (3.)Mr. Clinton Smiley will ambulate with CONTACT GUARD ASSIST for 75+ feet with the least restrictive device within 7 treatment day(s) while maintaining normal vital signs. (4.)Mr. Clinton Smiley will perform exercises per HEP for 10+ minutes to improve strength and mobility within 7 days. ____________________________________________________________________________________________ Outcome: Progressing Towards Goal 
  
PHYSICAL THERAPY: Daily Note and AM 3/18/2020 INPATIENT: PT Visit Days : 3 Payor: Trupti Alberts / Plan: Rosalie Ruiz / Product Type: [employer] /   
  
NAME/AGE/GENDER: Ibeth Courtney is a 69 y.o. male PRIMARY DIAGNOSIS: Acute respiratory failure [facility] [J96.00] Fluid overload [E87.70] Acute hypoxemic respiratory failure (HCC) Acute hypoxemic respiratory failure (HCC) Procedure(s) (LRB): ULTRASOUND (Bilateral) 1 Day Post-Op ICD-10: Treatment Diagnosis:  
 · Generalized Muscle Weakness (M62.81) · Difficulty in walking, Not elsewhere classified (R26.2) Precaution/Allergies: 
Heparin; Amoxicillin; Keflex [cephalexin]; and Pcn [penicillins] ASSESSMENT:  
 
Mr. Clinton Smiley is supine and agreeable to therapy treatment. The patient participated in a few LE exs in supine and then performed bed mobility with SBA and sat at the edge of the bed for extended rest break to allow for BP reading.   The patient then performed multiple reps of sit to stand at the edge of the bed to improve endurance and standing ability. The patient took rest breaks in between each rep. The patient there performed sit to stand and ambulated 5' to the recliner chair with the RW and CGA. He sat in the chair for an additional rest break, before performing additional sit to stand transfers while working with OT on self-care. He required continual cueing for hand placement and safety awareness. The patient then worked on dynamic sitting balance and activity tolerance in the recliner chair with reaching, bending, and twisting activities. The patient was then positioned for comfort in the chair with needs in reach. Overall the patient is making slow progress toward therapy goals as indicated by increased activity tolerance. Will continue skilled PT treatment as patient is still below functional baseline. 140/75 supine 102/52 sitting initially 90/54 sitting (after standing) At this time, patient is appropriate for Co-treatment with occupational therapy due to patient's decreased overall endurance/tolerance levels, as well as need for high level skilled assistance to complete functional transfers/mobility and functional tasks. Yvon Melendrez is appropriate for a multidisciplinary co-treatment of PT and OT to address goals of both disciplines. This section established at most recent assessment PROBLEM LIST (Impairments causing functional limitations): 1. Decreased Strength 2. Decreased ADL/Functional Activities 3. Decreased Transfer Abilities 4. Decreased Ambulation Ability/Technique 5. Decreased Balance 6. Decreased Activity Tolerance 7. Increased Fatigue 8. Increased Shortness of Breath 9. Edema/Girth INTERVENTIONS PLANNED: (Benefits and precautions of physical therapy have been discussed with the patient.) 1. Balance Exercise 2. Bed Mobility 3. Family Education 4. Gait Training 5. Home Exercise Program (HEP) 6. Neuromuscular Re-education/Strengthening 7. Therapeutic Activites 8. Therapeutic Exercise/Strengthening 9. Transfer Training TREATMENT PLAN: Frequency/Duration: 3 times a week for duration of hospital stay Rehabilitation Potential For Stated Goals: Good REHAB RECOMMENDATIONS (at time of discharge pending progress):   
Placement: It is my opinion, based on this patient's performance to date, that Mr. Gregory Flores may benefit from intensive therapy at a [address] [place], [place] [address]e after discharge due to the functional deficits listed above that are likely to improve with skilled rehabilitation and concerns that he/she may be unsafe to be unsupervised at home due to a fall risk, safety concerns for transfers and ambulation at home. Equipment: ? To be determined HISTORY:  
History of Present Injury/Illness (Reason for Referral): Mr. 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 Was DC 2/12/20 from CABG and aortic valve replacement complicated by CKD requiring HD, HIT +, DVTs, wound left thigh on wound vac, pneumonia DC on levaquin Q 48 hours last dose 2/11/20. He was DC on 2 L O2 however has been increased to 4L O2 via NC at STR yesterday when he arrived. Today he was at HD and reports dizziness with sitting up and acute onset of SOB. Son at bedside and states pt was doing ok since DC yesterday until today at HD. INR 2.4. CXR showed improving interstitial edema, unchanged left basilar opacity and left pleural effusion. Pt given lasix in ED. Pt is SOB when talking in short sentences. Denies CP, n/v.  Has had diarrhea however is not new since hospitalization.     
  
Past Medical History/Comorbidities:  
Mr. Gregory Flores  has a past medical history of Arthritis, BPH (benign prostatic hyperplasia) (1/14/2013), CAD (coronary artery disease), DM type 2 (diabetes mellitus, type 2) ([facility] (dx 2004), Dyspnea, Gout (1/14/2013), HLD (hyperlipidemia) (1/14/2013), HTN (hypertension), Morbid obesity ([facility] (9/3/14), Psychiatric disorder, Rheumatic fever, Seasonal allergic rhinitis, Severe aortic valve stenosis, Thyroid disease, and Unspecified sleep apnea (2016). Mr. Anabel Moreland  has a past surgical history that includes hx tonsil and adenoidectomy; hx heart catheterization (12/23/2019); hx orthopaedic (Left); hx cataract removal (Bilateral, 2012); and ir insert tunl cvc w port over 5 years (2/4/2020). Social History/Living Environment:  
Home Environment: Private residence # Steps to Enter: 2 Rails to Enter: No 
One/Two Story Residence: One story Living Alone: No 
Support Systems: Spouse/Significant Other/Partner Patient Expects to be Discharged to[de-identified] Rehabilitation facility Current DME Used/Available at Home: Cane, straight, Walker, rolling Tub or Shower Type: Shower Prior Level of Function/Work/Activity: 
Lives with spouse, admit from rehab; has been using RW short distance ambulation, assist ADLs Personal Factors:   
      Sex:  male Age:  69 y.o. Overall Behavior:  agreeable Number of Personal Factors/Comorbidities that affect the Plan of Care: 
CAD, DM, DVTs, age 3+: HIGH COMPLEXITY EXAMINATION:  
Most Recent Physical Functioning:  
Gross Assessment: 
  
         
  
Posture: 
  
Balance: 
Sitting: Impaired Sitting - Static: Good (unsupported) Sitting - Dynamic: Fair (occasional) Standing: Impaired Standing - Static: Fair Standing - Dynamic : Fair Bed Mobility: 
Supine to Sit: Minimum assistance;Contact guard assistance Sit to Supine: Contact guard assistance Scooting: Contact guard assistance Interventions: Safety awareness training;Verbal cues Wheelchair Mobility: 
  
Transfers: 
Sit to Stand: Minimum assistance;Contact guard assistance Stand to Sit: Contact guard assistance Bed to Chair: Contact guard assistance Interventions: Verbal cues; Safety awareness training Gait: 
  
Base of Support: Widened;Center of gravity altered Speed/Federica: Pace decreased (<100 feet/min) Step Length: Left shortened;Right shortened Gait Abnormalities: Decreased step clearance;Trunk sway increased; Path deviations Distance (ft): 5 Feet (ft) Assistive Device: Walker, rolling Ambulation - Level of Assistance: Contact guard assistance Interventions: Verbal cues; Safety awareness training Body Structures Involved: 1. Heart 2. Lungs 3. Muscles Body Functions Affected: 1. Cardio 2. Respiratory 3. Movement Related Activities and Participation Affected: 1. General Tasks and Demands 2. Mobility 3. Self Care Number of elements that affect the Plan of Care: 4+: HIGH COMPLEXITY CLINICAL PRESENTATION:  
Presentation: Evolving clinical presentation with changing clinical characteristics: MODERATE COMPLEXITY CLINICAL DECISION MAKIN[address] [place] [id number] AM-PAC 6 Clicks Basic Mobility Inpatient Short Form How much difficulty does the patient currently have. .. Unable A Lot A Little None 1. Turning over in bed (including adjusting bedclothes, sheets and blankets)? [] 1   [] 2   [x] 3   [] 4  
2. Sitting down on and standing up from a chair with arms ( e.g., wheelchair, bedside commode, etc.)   [] 1   [] 2   [x] 3   [] 4  
3. Moving from lying on back to sitting on the side of the bed? [] 1   [x] 2   [] 3   [] 4 How much help from another person does the patient currently need. .. Total A Lot A Little None 4. Moving to and from a bed to a chair (including a wheelchair)? [] 1   [] 2   [x] 3   [] 4  
5. Need to walk in hospital room? [] 1   [] 2   [x] 3   [] 4  
6. Climbing 3-5 steps with a railing? [x] 1   [] 2   [] 3   [] 4  
© , Trustees of [address] [place] [id number], under license to [employer]. All rights reserved Score:  Initial: 17 Most Recent: 15 (Date: 3/13/20 ) Interpretation of Tool:  Represents activities that are increasingly more difficult (i.e. Bed mobility, Transfers, Gait). Medical Necessity:    
· Patient is expected to demonstrate progress in  
· strength, range of motion, balance, and coordination ·  to  
· decrease assistance required with overall functional mobility, transfers, ambulation · . · Patient demonstrates · good ·  rehab potential due to higher previous functional level. Reason for Services/Other Comments: 
· Patient · continues to require present interventions due to patient's inability to perform bed mobility, transfers, ambulation safely and effectively · . Use of outcome tool(s) and clinical judgement create a POC that gives a: Clear prediction of patient's progress: LOW COMPLEXITY  
  
 
 
 
TREATMENT:  
(In addition to Assessment/Re-Assessment sessions the following treatments were rendered) Pre-treatment Symptoms/Complaints:  None Pain: Initial:  
Pain Intensity 1: 0  Post Session: 0/10 PT Reassessment Table:  
Initial Physical Functioning: Most Recent Physical Functioning:  
Gross Assessment: 
AROM: Generally decreased, functional(B LE) Strength: Generally decreased, functional(B LE grossly 4/5) Coordination: Generally decreased, functional 
Sensation: Intact(B LE) Gross Assessment:  
   
Posture:  
Posture (WDL): Exceptions to [facility] Posture Assessment: Forward head, Rounded shoulders Posture:  
   
Balance:  
Sitting: Intact Sitting - Static: Good (unsupported) Sitting - Dynamic: Fair (occasional) Standing: Impaired Standing - Static: Good, Fair Standing - Dynamic : Fair Balance:  
Sitting: Impaired Sitting - Static: Good (unsupported) Sitting - Dynamic: Fair (occasional) Standing: Impaired Standing - Static: Fair Standing - Dynamic : Fair Bed Mobility:  
Rolling: Stand-by assistance Supine to Sit: Minimum assistance Sit to Supine: Minimum assistance Scooting: Minimum assistance Interventions: Manual cues, Verbal cues Bed Mobility:  
Supine to Sit: Minimum assistance;Contact guard assistance Sit to Supine: Contact guard assistance Scooting: Contact guard assistance Interventions: Safety awareness training;Verbal cues Transfers:  
Sit to Stand: Minimum assistance Stand to Sit: Minimum assistance Bed to Chair: Minimum assistance Interventions: Safety awareness training, Verbal cues, Visual cues Duration: 13 Minutes Transfers:  
Sit to Stand: Minimum assistance;Contact guard assistance Stand to Sit: Contact guard assistance Bed to Chair: Contact guard assistance Interventions: Verbal cues; Safety awareness training Gait:  
Base of Support: Widened Speed/Federica: Slow Step Length: Left shortened, Right shortened Swing Pattern: Left symmetrical, Right symmetrical 
Gait Abnormalities: Decreased step clearance Ambulation - Level of Assistance: Contact guard assistance, Minimal assistance Distance (ft): 5 Feet (ft)(from bed to chair, chair to bed) Assistive Device: Walker, rolling Interventions: Safety awareness training, Verbal cues Gait:  
Base of Support: Widened;Center of gravity altered Speed/Federica: Pace decreased (<100 feet/min) Step Length: Left shortened;Right shortened Gait Abnormalities: Decreased step clearance;Trunk sway increased; Path deviations Ambulation - Level of Assistance: Contact guard assistance Distance (ft): 5 Feet (ft) Assistive Device: Walker, rolling Interventions: Verbal cues; Safety awareness training Therapeutic Activity: (44 minutes ):  Therapeutic activities including Bed mobility, seated static/dynamic activities, sit-stand transfer training and Chair transfers to improve mobility, strength, balance, coordination and activity tolerance. Required minimal Verbal cues; Safety awareness training to promote static and dynamic balance in standing and promote motor control of bilateral, lower extremity(s). Today's treatment session addressed Decreased Strength, Decreased ADL/Functional Activities, Decreased Ambulation Ability/Technique, Decreased Activity Tolerance and fluctuations in BP to progress towards achieving goal(s) 1, 2 and 3. During this session, Occupational Therapy addressed ADLs to progress towards their discipline specific goal(s). Co-treatment was necessary to improve patient's ability to increase activity demands and ability to return to normal functional activity. Date: 
3/13/20 Date: 
 Date: Activity/Exercise Parameters Parameters Parameters LAQ 15x AB Seated marching 15x AB Ankle pumps 15x AB Seated hip aBd     
     
     
     
A=active, B=bilateral 
 
Braces/Orthotics/Lines/Etc:  
· IV · Nasal cannula Treatment/Session Assessment:   
· Response to Treatment:   See above · Interdisciplinary Collaboration:  
o Physical Therapy Assistant 
o Registered Nurse · After treatment position/precautions:  
o Up in chair 
o Bed/Chair-wheels locked 
o Bed in low position 
o Call light within reach 
o RN notified · Compliance with Program/Exercises: Compliant all of the time · Recommendations/Intent for next treatment session: \"Next visit will focus on advancements to more challenging activities\" as tolerated Total Treatment Duration: PT Patient Time In/Time Out Time In: 1100 Time Out: 1144 Sami Syed, PTA

## 2020-03-18 NOTE — PROGRESS NOTES
Renea Menard Admission Date: 2/13/2020 Daily Progress Note: 3/18/2020 The patient's chart is reviewed and the patient is discussed with the staff. 70 yo Lyric Downing a history of CAD s/p CABG with AVR 1/10/20 by Dr. Marilu Chilel complicated by CKD requiring HD, HIT +, DVTs, wound left thigh and pneumonia.  Was discharged on Levaquin Q 48 hours last dose 2/11/20. Milli Casillas was discharged on NC 2 L but has been increased to NC 4L O2 at STR. He presented to the ER on 2/15 with increased SOB. CTA chest was negative for PE but persistent LLL opacity. He had a bronch 2/17 with negative cultures and was treated for PNA using levaquin. He is now ESRD and being followed by nephrology.  
  
  We were reconsulted on 3/6 for acute respiratory failure requiring bipap. CXR shows increased consolidation in the left base. He is febrile with a temp of 100.5. He has refused a swallow evaluation. ID is following and he was started on Vanc and Cefepime on 3/6. US 3/17 with no pleural effusion. Subjective:  
 
Patient tells me his breathing actually feels some better today. Started back using his IS yesterday. O2 down to 4L at present. No new issues. Current Facility-Administered Medications Medication Dose Route Frequency  perflutren lipid microspheres (DEFINITY) in NS bolus IV  1 mL IntraVENous PRN  
 warfarin (COUMADIN) tablet 6 mg  6 mg Oral QPM  
 sertraline (ZOLOFT) tablet 100 mg  100 mg Oral DAILY  buPROPion [facility]) tablet 75 mg  75 mg Oral BID  argatroban 50 mg in 0.9% sodium chloride 50 mL (1000 mcg/mL) infusion  0.5-10 mcg/kg/min IntraVENous TITRATE  loratadine (CLARITIN) tablet 10 mg  10 mg Oral DAILY  diphenhydrAMINE (BENADRYL) capsule 25 mg  25 mg Oral Q6H PRN  mirtazapine (REMERON) tablet 15 mg  15 mg Oral QHS  ALPRAZolam (XANAX) tablet 0.5 mg  0.5 mg Oral Q6H PRN  
 furosemide (LASIX) injection 80 mg  80 mg IntraVENous BID  
  bacitracin 500 unit/gram ointment   Topical BID  insulin lispro (HUMALOG) injection   SubCUTAneous AC&HS  
 dextrose 40% (GLUTOSE) oral gel 1 Tube  15 g Oral PRN  
 glucagon (GLUCAGEN) injection 1 mg  1 mg IntraMUSCular PRN  
 dextrose (D50W) injection syrg 12.5-25 g  25-50 mL IntraVENous PRN  
 HYDROcodone-homatropine (HYCODAN) 5-1.5 mg/5 mL (5 mL) syrup 5 mL  5 mL Oral Q4H PRN  
 benzonatate (TESSALON) capsule 100 mg  100 mg Oral TID PRN  
 guaiFENesin ER (MUCINEX) tablet 1,200 mg  1,200 mg Oral Q12H  
 albuterol (PROVENTIL VENTOLIN) nebulizer solution 2.5 mg  2.5 mg Nebulization Q6H RT  
 insulin glargine (LANTUS) injection 5 Units  5 Units SubCUTAneous QHS  sodium chloride (OCEAN) 0.65 % nasal squeeze bottle 2 Spray  2 Spray Both Nostrils Q2H PRN  
 morphine injection 2 mg  2 mg IntraVENous Q6H PRN  
 sodium chloride 3% hypertonic nebulizer soln  4 mL Nebulization BID RT  
 sodium chloride (OCEAN) 0.65 % nasal squeeze bottle 2 Spray  2 Spray Both Nostrils BID  epoetin maritza-epbx (RETACRIT) 14,000 Units combo injection  14,000 Units SubCUTAneous Q7D  
 polyethylene glycol (MIRALAX) packet 17 g  17 g Oral DAILY  midodrine (PROAMITINE) tablet 10 mg  10 mg Oral TID WITH MEALS  tamsulosin (FLOMAX) capsule 0.4 mg  0.4 mg Oral QHS  loperamide (IMODIUM) capsule 4 mg  4 mg Oral QID PRN  pantothenic ac-min oil-pet,hyd (AQUAPHOR) 41 % ointment   Topical DAILY  traMADol (ULTRAM) tablet 50 mg  50 mg Oral Q6H PRN  
 sodium chloride (NS) flush 5-40 mL  5-40 mL IntraVENous PRN  
 acetaminophen (TYLENOL) tablet 650 mg  650 mg Oral Q4H PRN  
 naloxone (NARCAN) injection 0.4 mg  0.4 mg IntraVENous PRN  
 ondansetron (ZOFRAN) injection 4 mg  4 mg IntraVENous Q4H PRN  
 bisacodyL (DULCOLAX) tablet 5 mg  5 mg Oral DAILY PRN Review of Systems Constitutional: negative for fever, chills, sweats Cardiovascular: negative for chest pain, palpitations, syncope, edema Gastrointestinal: negative for dysphagia, reflux, vomiting, diarrhea, abdominal pain, or melena Neurologic: + generalized weakness. negative for focal weakness, numbness, headache Objective:  
 
Vitals:  
 03/18/20 8720 03/18/20 0557 03/18/20 0720 03/18/20 0115 BP:  145/78 127/71 Pulse:  85 78 Resp:  21 20 Temp:  98.4 °F (36.9 °C) 98.4 °F (36.9 °C) SpO2: 97% 94% 93% 95% Weight:  247 lb 9.6 oz (112.3 kg) Height:      
 
Intake and Output:  
03/16 1901 - 03/18 0700 In: 200 [P.O.:720; I.V.:50] Out: 2850 [Urine:850] No intake/output data recorded. Physical Exam:  
Constitution:  the patient is well developed and in no acute distress EENMT:  Sclera clear, pupils equal, oral mucosa moist 
Respiratory: CTA B in anterior lung fields. Decreased at B bases L>R Cardiovascular:  RRR without M,G,R 
Gastrointestinal: soft and non-tender; with positive bowel sounds. Musculoskeletal: warm without cyanosis. There is no lower leg edema. Skin:  no jaundice or rashes, no wounds Neurologic: no gross neuro deficits Psychiatric:  alert and oriented x ppt CHEST XRAY:  
3/18/20 IMPRESSION: No significant interval change. 3/17/20 IMPRESSION: Stable bibasilar infiltrates, left greater than right LAB No lab exists for component: [place] Recent Labs  
  03/18/20 
0438 03/17/20 
1456 03/17/20 
4014 HGB 10.3*  --   --   
HCT 33.3*  --   --   
INR 3.0 2.0 3.1 Recent Labs  
  03/18/20 
0438 03/17/20 
0339 03/16/20 
1135 03/16/20 
6803  134*  --  134* K 4.0 3.7  --  4.0  
 99  --  100 CO2 27 26  --  28  
* 139*  --  195* BUN 23 49*  --  29* CREA 3.77* 5.11*  --  4.27* CA 8.7 8.5  --  8.3 ALB  --   --  2.2*  --   
SGOT  --   --  32  --   
 
ABG:  No results found for: PH, PHI, PCO2, PCO2I, PO2, PO2I, HCO3, HCO3I, FIO2, FIO2I Assessment:  (Medical Decision Making) Hospital Problems  Date Reviewed: 3/9/2020 Codes Class Noted POA Acute on chronic respiratory failure with hypoxia [facility]) ICD-10-CM: J96.21 
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 
ICD-9-CM: 250.40, 583.81  8/26/2019 Yes Obesity, morbid ([address] (Chronic) ICD-10-CM: E66.01 
ICD-9-CM: 278.01  10/19/2018 Yes DM type 2 (diabetes mellitus, type 2) (HCC) (Chronic) ICD-10-CM: E11.9 ICD-9-CM: 250.00  1/14/2013 Yes HLD (hyperlipidemia) (Chronic) ICD-10-CM: A57.6 ICD-9-CM: 272.4  1/14/2013 Yes Pt with recurrent respiratory failure. Feel like much of this is due to atelectasis/mucus plugging related to generalized weakness and debility. Does not appear to be related to pleural effusion. May be component of pulmonary edema with renal failure. CXR somewhat suggestive of pulmonary edema. With orthostatic hypotension feel like repeating his echo to check his current cardiac function is warranted. Plan:  (Medical Decision Making) -repeat TTE performed this morning. Will follow up on results. -cont efforts to minimize atelectasis including vibralung, hypertonic saline nebs, and IS.  
-cont to wean o2 as sats allow.  
-Continue albuterol.  
-cont lasix, monitor UOP and getting HD per nephrology Jhonatan Mayes MD

## 2020-03-18 NOTE — PROGRESS NOTES
[facility] PROGRESS NOTE Follow up for: DEJUAN/CKD Subjective:  
Patient seen and examined on rounds, shortness of breath, anxiety better today with xanax, no new complaints. Labs and chart reviewed ROS: 
Gen - no fever, no chills, appetite okay CV - no chest pain, no orthopnea Lung - no shortness of breath, no cough GI- no N/V/D Ext/Access - no edema. Left side TCC Objective:  
Exam: 
Vitals:  
 03/18/20 7556 03/18/20 4246 03/18/20 0720 03/18/20 9285 BP:  145/78 127/71 Pulse:  85 78 Resp:  21 20 Temp:  98.4 °F (36.9 °C) 98.4 °F (36.9 °C) SpO2: 97% 94% 93% 95% Weight:  112.3 kg (247 lb 9.6 oz) Height:      
 
 
 
Intake/Output Summary (Last 24 hours) at 3/18/2020 1040 Last data filed at 3/18/2020 1019 Gross per 24 hour Intake 1010 ml Output 2500 ml Net -1490 ml  
 
 
Current Facility-Administered Medications Medication Dose Route Frequency  perflutren lipid microspheres (DEFINITY) in NS bolus IV  1 mL IntraVENous PRN  
 warfarin (COUMADIN) tablet 6 mg  6 mg Oral QPM  
 sertraline (ZOLOFT) tablet 100 mg  100 mg Oral DAILY  buPROPion [facility]) tablet 75 mg  75 mg Oral BID  argatroban 50 mg in 0.9% sodium chloride 50 mL (1000 mcg/mL) infusion  0.5-10 mcg/kg/min IntraVENous TITRATE  loratadine (CLARITIN) tablet 10 mg  10 mg Oral DAILY  diphenhydrAMINE (BENADRYL) capsule 25 mg  25 mg Oral Q6H PRN  mirtazapine (REMERON) tablet 15 mg  15 mg Oral QHS  ALPRAZolam (XANAX) tablet 0.5 mg  0.5 mg Oral Q6H PRN  
 furosemide (LASIX) injection 80 mg  80 mg IntraVENous BID  
 bacitracin 500 unit/gram ointment   Topical BID  insulin lispro (HUMALOG) injection   SubCUTAneous AC&HS  
 dextrose 40% (GLUTOSE) oral gel 1 Tube  15 g Oral PRN  
 glucagon (GLUCAGEN) injection 1 mg  1 mg IntraMUSCular PRN  
 dextrose (D50W) injection syrg 12.5-25 g  25-50 mL IntraVENous PRN  
 HYDROcodone-homatropine (HYCODAN) 5-1.5 mg/5 mL (5 mL) syrup 5 mL  5 mL Oral Q4H PRN  
 benzonatate (TESSALON) capsule 100 mg  100 mg Oral TID PRN  
 guaiFENesin ER (MUCINEX) tablet 1,200 mg  1,200 mg Oral Q12H  
 albuterol (PROVENTIL VENTOLIN) nebulizer solution 2.5 mg  2.5 mg Nebulization Q6H RT  
 insulin glargine (LANTUS) injection 5 Units  5 Units SubCUTAneous QHS  sodium chloride (OCEAN) 0.65 % nasal squeeze bottle 2 Spray  2 Spray Both Nostrils Q2H PRN  
 morphine injection 2 mg  2 mg IntraVENous Q6H PRN  
 sodium chloride 3% hypertonic nebulizer soln  4 mL Nebulization BID RT  
 sodium chloride (OCEAN) 0.65 % nasal squeeze bottle 2 Spray  2 Spray Both Nostrils BID  epoetin maritza-epbx (RETACRIT) 14,000 Units combo injection  14,000 Units SubCUTAneous Q7D  
 polyethylene glycol (MIRALAX) packet 17 g  17 g Oral DAILY  midodrine (PROAMITINE) tablet 10 mg  10 mg Oral TID WITH MEALS  tamsulosin (FLOMAX) capsule 0.4 mg  0.4 mg Oral QHS  loperamide (IMODIUM) capsule 4 mg  4 mg Oral QID PRN  pantothenic ac-min oil-pet,hyd (AQUAPHOR) 41 % ointment   Topical DAILY  traMADol (ULTRAM) tablet 50 mg  50 mg Oral Q6H PRN  
 sodium chloride (NS) flush 5-40 mL  5-40 mL IntraVENous PRN  
 acetaminophen (TYLENOL) tablet 650 mg  650 mg Oral Q4H PRN  
 naloxone (NARCAN) injection 0.4 mg  0.4 mg IntraVENous PRN  
 ondansetron (ZOFRAN) injection 4 mg  4 mg IntraVENous Q4H PRN  
 bisacodyL (DULCOLAX) tablet 5 mg  5 mg Oral DAILY PRN  
 
 
EXAM 
GEN - Alert, oriented, in no distress CV - S1, S2, RRR, no rub, murmur, or gallop Lung - clear to auscultation bilaterally Abd - soft, nontender, BS present Ext/ Access - no edema, Left TCC- intact Recent Labs  
  03/18/20 
8557 HGB 10.3* HCT 33.3* Recent Labs  
  03/18/20 
0438 03/17/20 
0339 03/16/20 
8363  134* 134* K 4.0 3.7 4.0  
 99 100 CO2 27 26 28 BUN 23 49* 29* CREA 3.77* 5.11* 4.27* CA 8.7 8.5 8.3 * 139* 195* Assessment and Plan: 1) DEJUAN on CKD-  No sign of recovery. HD dependent since Jan, '20- likely ESRD 
-next HD tomorrow for volume and clearance 2) Hypotension-  On midodrine prior to dialysis 3)  Hypoxic respiratory failure- on 3L O2, cxr low lung volumes with airspace consolidation left mid and lower lung. 
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

## 2020-03-18 NOTE — PROGRESS NOTES
Sacral dressing changed per MD order. Pt tolerated well. Call light in reach, door open will monitor.

## 2020-03-18 NOTE — PROGRESS NOTES
03/17/20 2245 Oxygen Therapy O2 Sat (%) 96 % Pulse via Oximetry 85 beats per minute O2 Device CPAP mask PEEP/CPAP (cm H2O) 8 cm H20 FIO2 (%) 35 % Respiratory Respiratory (WDL) X Respiratory Pattern Regular Chest/Tracheal Assessment Chest expansion, symmetrical  
Breath Sounds Bilateral Coarse Cough Non-productive CPAP/BIPAP  
CPAP/BIPAP Start/Stop On Device Mode CPAP  
$$ CPAP Daily Yes Mask Type and Size Full face Skin Condition intact PIP Observed 9 cm H20  
EPAP (cm H2O) 8 cm H2O Vt Spont (ml) 389 ml Ve Observed (l/min) 10.9 l/min Backup Rate 10 Total RR (Spontaneous) 25 breaths per minute Insp Rise Time (sec) 2 Leak (Estimated) 19 L/min  
Pt's Home Machine No  
Biomedical Check Performed Yes Settings Verified Yes Alarm Settings High Pressure 30 Low Pressure 2 Apnea 20 Low Ve 3 High Rate 50 Low Rate 10 Pulmonary Toilet Pulmonary Toilet H. O.B elevated

## 2020-03-18 NOTE — PROGRESS NOTES
SBAR  Received from [facility]. Pt resting in bed. Respirations even and unlabored. No s/sx of distress noted. Call light within reach, bed low and locked, door open.

## 2020-03-18 NOTE — PROGRESS NOTES
Bedside shift report given to oncoming nurse. Pt resting comfortably. Respirations even and unlabored. No issues or concerns verbalized at this time.

## 2020-03-18 NOTE — PROGRESS NOTES
Warfarin dosing per pharmacist 
 
Elayne Rod is a 69 y.o. male. Height: 5' 10\" (177.8 cm)   Weight 103.5 kg (228 lbs) Indication:  AVR and afib Goal INR:  2.5 - 3.5 Home dose:  2.5 mg daily Risk factors or significant drug interactions: HIT+(SHARON positive on 1/18/20), amiodarone (dc'd 2/5), Levofloxacin (2/13- 2/20 ), mirtazapine (2/13-2/24), escitalopram (2/19-2/20), trazodone (started 2/24) Other anticoagulants: Argatroban Daily Monitoring Date  INR     Warfarin dose HGB              Notes 2/14  2.4  3 mg  8.7 2/15  2.2  4 mg  9.6 2/16  2.1  5 mg  9.8 2/17  2.1  5 mg  9.5 2/18  2.8  2 mg  9.3 2/19  2.5  2 mg  8.7 
2/20  1.9  3 mg   8.8 
2/21  1.9  4 mg  8.6 
2/22  2.1  3 mg  8.5 
2/23  1.9  4 mg  --- 
2/24  1.7  3 mg + 2 mg --- 
2/25  1.7  5 mg  --- 
2/26  1.6  6 mg  --- 
2/27  1.7  6 mg   --- 
2/28  1.7  7.5 mg  --- 
2/29  2.1 ` 6 mg  7.7 
3/1  2.0  7.5 mg  --- 
3/2  2.9  6 mg  --- 
3/3  3.4  5 mg  8.0 
3/4  2.7  7.5mg  8.6 
3/5  2.9  6 mg  8.0 
3/6  3.1  Held  8.4 
3/7  2.6  6 mg  8.0 
3/8  2.7  6mg  --- 
3/9  2.5  6 mg  --- 
3/10  2.7  6 mg  7.6 
3/11  3.3  5 mg  8.3 
3/12  4.3  Hold  8.2 
3/13  3.9  Hold  8.8 
3/14  3.5  Hold  --- 
3/15  2.8  Hold  --- 
3/16  2.5  Hold  --- 
3/17  3.1  6 mg  --- On argatroban 3/18  3.0  6 mg  10.3 On argatroban Pulmonary performed thoracentesis on 3/17. Argatroban and warfarin restarted post procedure. Continue argatroban bridge and will give the patient 6 mg of warfarin today. Daily INR. Thank you, Waylon Hatchet, PharmD, [facility] Clinical Pharmacy Specialist 
[phone]

## 2020-03-18 NOTE — PROGRESS NOTES
Hospitalist Note Admit Date:  2020  3:03 PM  
Name:  Esha Almeida Age:  69 y.o. 
:  1945 MRN:  [id number] PCP:  Reina John MD 
Treatment Team: Attending Provider: Jesús Bruce MD; Consulting Provider: Emily Vela MD; Utilization Review: Alcon Price RN; Hospitalist: Rk Pizano NP; Hospitalist: Nirali Graham NP; Consulting Provider: Deborah Yin MD; Consulting Provider: New Lopez MD; Care Manager: Balwinder Sanchez.; Utilization Review: Radha Madsen; Consulting Provider: Mal Hernandez MD; Consulting Provider: Velma Ambrosio MD; Consulting Provider: Jesús Bruce MD; Occupational Therapist: Johnson Banda OTR/L; Physical Therapy Assistant: Dinorah Youssef; Primary Nurse: Shanta Mendes RN 
 
HPI/Subjective:  
 
Patient was admitted with  SOB and Diagnosed to have  Resp failure with Pneumonia Also has been on Dialysis Feels weak  But  No fever  Denies hemoptysis Objective:  
 
Patient Vitals for the past 24 hrs: 
 Temp Pulse Resp BP SpO2  
20 1442 98.2 °F (36.8 °C) 87 19 134/68 92 % 20 1341     95 % 20 1101    140/75 94 % 20 1057 98.3 °F (36.8 °C) 79 19 165/78 94 % 20 0839     95 % 20 0720 98.4 °F (36.9 °C) 78 20 127/71 93 % 20 0307 98.4 °F (36.9 °C) 85 21 145/78 94 % 20 0233     97 % 20 2343 97.4 °F (36.3 °C) 82 26 136/74 98 % 20 2245     96 % 20 2050     95 % 20 1925 98.6 °F (37 °C) 84 20 161/69 95 % Oxygen Therapy O2 Sat (%): 92 % (20 1442) Pulse via Oximetry: 84 beats per minute (20 1341) O2 Device: Nasal cannula (20 1341) PEEP/CPAP (cm H2O): 5 cm H20 (20 1341) O2 Flow Rate (L/min): 4.5 l/min (20 1101) O2 Temperature: 87.8 °F (31 °C) (03/10/20 0618) FIO2 (%): 35 % (20 0233) Estimated body mass index is 35.53 kg/m² as calculated from the following: 
  Height as of this encounter: 5' 10\" (1.778 m). Weight as of this encounter: 112.3 kg (247 lb 9.6 oz). Intake/Output Summary (Last 24 hours) at 3/18/2020 1613 Last data filed at 3/18/2020 1315 Gross per 24 hour Intake 770 ml Output 500 ml Net 270 ml *Note that automatically entered I/Os may not be accurate; dependent on patient compliance with collection and accurate  by techs. General:    Appears   Chronically weak, Alert. CV:   RRR. Systolic  murmur, rub, or gallop. Scar of Cardiac surgery Lungs:   CTAB. Few wheezes Abdomen:   Soft, nontender, nondistended. Extremities: Warm and dry. No cyanosis or edema. Skin:     No rashes or jaundice. Neuro:  No gross focal deficits Data Review: 
I have reviewed all labs, meds, and studies from the last 24 hours: 
 
Recent Results (from the past 24 hour(s)) GLUCOSE, POC Collection Time: 03/17/20  8:48 PM  
Result Value Ref Range Glucose (POC) 214 (H) 65 - 100 mg/dL PTT Collection Time: 03/17/20 11:12 PM  
Result Value Ref Range aPTT 54.5 (H) 24.3 - 35.4 SEC PROTHROMBIN TIME + INR Collection Time: 03/18/20  4:38 AM  
Result Value Ref Range Prothrombin time 32.2 (H) 12.0 - 14.7 sec INR 3.0 METABOLIC PANEL, BASIC Collection Time: 03/18/20  4:38 AM  
Result Value Ref Range Sodium 136 136 - 145 mmol/L Potassium 4.0 3.5 - 5.1 mmol/L Chloride 101 98 - 107 mmol/L  
 CO2 27 21 - 32 mmol/L Anion gap 8 7 - 16 mmol/L Glucose 136 (H) 65 - 100 mg/dL BUN 23 8 - 23 MG/DL Creatinine 3.77 (H) 0.8 - 1.5 MG/DL  
 GFR est AA 20 (L) >60 ml/min/1.73m2 GFR est non-AA 17 (L) >60 ml/min/1.73m2 Calcium 8.7 8.3 - 10.4 MG/DL  
HGB & HCT Collection Time: 03/18/20  4:38 AM  
Result Value Ref Range HGB 10.3 (L) 13.6 - 17.2 g/dL HCT 33.3 (L) 41.1 - 50.3 % PTT  Collection Time: 03/18/20  4:38 AM  
 Result Value Ref Range aPTT 61.1 (H) 24.3 - 35.4 SEC GLUCOSE, POC Collection Time: 03/18/20  5:26 AM  
Result Value Ref Range Glucose (POC) 156 (H) 65 - 100 mg/dL GLUCOSE, POC Collection Time: 03/18/20 10:56 AM  
Result Value Ref Range Glucose (POC) 223 (H) 65 - 100 mg/dL All Micro Results Procedure Component Value Units Date/Time CULTURE, RESPIRATORY/SPUTUM/BRONCH Ruth Bolls STAIN [[id number]]  (Abnormal) Collected:  03/09/20 4443 Order Status:  Completed Specimen:  Sputum Updated:  03/12/20 6101 Special Requests: NO SPECIAL REQUESTS     
  GRAM STAIN 10 TO 50 WBC'S/OIF  
   0 TO 2 EPITHELIAL CELLS/OIF  
   FEW GRAM POSITIVE RODS MODERATE YEAST 4+ MUCUS PRESENT Culture result: MODERATE CANDIDA ALBICANS     
 CULTURE, BLOOD [[id number]] Collected:  03/06/20 0944 Order Status:  Completed Specimen:  Blood Updated:  03/11/20 1575 Special Requests: --     
  RIGHT Antecubital 
  
  Culture result: NO GROWTH 5 DAYS     
 CULTURE, BLOOD [[id number]] Collected:  03/06/20 6252 Order Status:  Completed Specimen:  Blood Updated:  03/11/20 7663 Special Requests: --     
  LEFT Antecubital 
  
  Culture result: NO GROWTH 5 DAYS     
 CULTURE, RESPIRATORY/SPUTUM/BRONCH Ruth Bolls STAIN [[id number]] Collected:  03/06/20 1600 Order Status:  Canceled Specimen:  Sputum C. DIFFICILE AG & TOXIN A/B [[id number]] Order Status:  Canceled Specimen:  Stool CULTURE, RESPIRATORY/SPUTUM/BRONCH Delisa Yon [[id number]] Collected:  02/17/20 1220 Order Status:  Completed Specimen:  Sputum from Bronchial Washing Updated:  02/24/20 1244 Special Requests: NO SPECIAL REQUESTS     
  GRAM STAIN 20 TO 35 WBCS SEEN PER OIF  
   1 TO 2 EPITHELIAL CELLS SEEN PER OIF  
      
  MODERATE GRAM POSITIVE COCCI  
     
   FEW GRAM POSITIVE RODS Culture result:    
  LIGHT NORMAL RESPIRATORY BRENNAN Current Meds: Current Facility-Administered Medications Medication Dose Route Frequency  warfarin (COUMADIN) tablet 6 mg  6 mg Oral QPM  
 sertraline (ZOLOFT) tablet 100 mg  100 mg Oral DAILY  buPROPion [facility]) tablet 75 mg  75 mg Oral BID  argatroban 50 mg in 0.9% sodium chloride 50 mL (1000 mcg/mL) infusion  0.5-10 mcg/kg/min IntraVENous TITRATE  loratadine (CLARITIN) tablet 10 mg  10 mg Oral DAILY  diphenhydrAMINE (BENADRYL) capsule 25 mg  25 mg Oral Q6H PRN  mirtazapine (REMERON) tablet 15 mg  15 mg Oral QHS  ALPRAZolam (XANAX) tablet 0.5 mg  0.5 mg Oral Q6H PRN  
 furosemide (LASIX) injection 80 mg  80 mg IntraVENous BID  
 bacitracin 500 unit/gram ointment   Topical BID  insulin lispro (HUMALOG) injection   SubCUTAneous AC&HS  
 dextrose 40% (GLUTOSE) oral gel 1 Tube  15 g Oral PRN  
 glucagon (GLUCAGEN) injection 1 mg  1 mg IntraMUSCular PRN  
 dextrose (D50W) injection syrg 12.5-25 g  25-50 mL IntraVENous PRN  
 HYDROcodone-homatropine (HYCODAN) 5-1.5 mg/5 mL (5 mL) syrup 5 mL  5 mL Oral Q4H PRN  
 benzonatate (TESSALON) capsule 100 mg  100 mg Oral TID PRN  
 guaiFENesin ER (MUCINEX) tablet 1,200 mg  1,200 mg Oral Q12H  
 albuterol (PROVENTIL VENTOLIN) nebulizer solution 2.5 mg  2.5 mg Nebulization Q6H RT  
 insulin glargine (LANTUS) injection 5 Units  5 Units SubCUTAneous QHS  sodium chloride (OCEAN) 0.65 % nasal squeeze bottle 2 Spray  2 Spray Both Nostrils Q2H PRN  
 morphine injection 2 mg  2 mg IntraVENous Q6H PRN  
 sodium chloride 3% hypertonic nebulizer soln  4 mL Nebulization BID RT  
 sodium chloride (OCEAN) 0.65 % nasal squeeze bottle 2 Spray  2 Spray Both Nostrils BID  epoetin maritza-epbx (RETACRIT) 14,000 Units combo injection  14,000 Units SubCUTAneous Q7D  
 polyethylene glycol (MIRALAX) packet 17 g  17 g Oral DAILY  midodrine (PROAMITINE) tablet 10 mg  10 mg Oral TID WITH MEALS  tamsulosin (FLOMAX) capsule 0.4 mg  0.4 mg Oral QHS  loperamide (IMODIUM) capsule 4 mg  4 mg Oral QID PRN  pantothenic ac-min oil-pet,hyd (AQUAPHOR) 41 % ointment   Topical DAILY  traMADol (ULTRAM) tablet 50 mg  50 mg Oral Q6H PRN  
 sodium chloride (NS) flush 5-40 mL  5-40 mL IntraVENous PRN  
 acetaminophen (TYLENOL) tablet 650 mg  650 mg Oral Q4H PRN  
 naloxone (NARCAN) injection 0.4 mg  0.4 mg IntraVENous PRN  
 ondansetron (ZOFRAN) injection 4 mg  4 mg IntraVENous Q4H PRN  
 bisacodyL (DULCOLAX) tablet 5 mg  5 mg Oral DAILY PRN Other Studies: 
Results for orders placed or performed during the hospital encounter of 20  
2D ECHO COMPLETE ADULT (TTE) W OR WO CONTR Narrative [facility] [address] [place], [address] 
[phone] Transthoracic Echocardiogram 
2D, M-mode, Doppler, and Color Doppler Patient: Cassondra Brunner 
MR #: [id number] : 1945 Age: 69 years Gender: Male Study date: 18-Mar-2020 Account #: [de-identified] Height: 69.7 in 
Weight: 219.6 lb 
BSA: 2.17 mï¾² Status:Routine Location: 824 BP: 85/ 55 Allergies: HEPARIN, AMOXICILLIN, CEPHALEXIN, PENICILLINS Sonographer:  Aleah Leblanc [facility] Group:  [address] Cardiology Referring Physician:  Ingrid Aguilar MD 
Reading Physician:  Ivone Mason. MD [facility] INDICATIONS: Orthostatic Hypotension following CABG. *Patient scanned sitting up. Some limited windows. * PROCEDURE: This was a routine study. A transthoracic echocardiogram was 
performed. The study included complete 2D imaging, M-mode, complete spectral 
Doppler, and color Doppler. Intravenous contrast (Definity) was administered. Echocardiographic views were limited by restricted patient mobility and poor 
acoustic window availability. Image quality was adequate. LEFT VENTRICLE: Size was normal. Systolic function was normal. Ejection 
fraction was estimated in the range of 55 % to 60 %. The septal walls appear hypokinetic. Wall thickness was normal. There was mild concentric  
hypertrophy. The E/e' ratio was 12.6. Left ventricular diastolic function parameters were 
normal. 
 
VENTRICULAR SEPTUM: There was paradoxical motion. RIGHT VENTRICLE: The size and function appear normal from parasternal window. Difficult to assess apically. Systolic function was normal. The tricuspid jet 
envelope definition was inadequate for estimation of RV systolic pressure. LEFT ATRIUM: The atrium was mildly dilated. RIGHT ATRIUM: Not well visualized. SYSTEMIC VEINS: IVC: The inferior vena cava was normal in size. The 
respirophasic change in diameter was less than 50%. AORTIC VALVE: The valve was not well visualized. The aortic valve area by the 
continuity equation was 1.47 cm2. The peak velocity was 2.41 m/s. The mean 
pressure gradient was 12 mmHg. The peak pressure gradient was 23 mmHg. The 
DI=0.47. The AT=70ms. There was mild stenosis. There was no insufficiency. MITRAL VALVE: Valve structure was normal. There was no evidence for stenosis. There was trivial regurgitation. TRICUSPID VALVE: Not well visualized. There was no evidence for stenosis. There 
was no regurgitation. PULMONIC VALVE: Not well visualized. There was no evidence for stenosis. There 
was trivial regurgitation. PERICARDIUM: There was no pericardial effusion. AORTA: The root exhibited normal size. SUMMARY: 
 
-  Left ventricle: Systolic function was normal. Ejection fraction was 
estimated in the range of 55 % to 60 %. The septal walls appear hypokinetic. There was mild concentric hypertrophy. The E/e' ratio was 12.6. 
 
-  Ventricular septum: There was paradoxical motion. -  Left atrium: The atrium was mildly dilated. -  Inferior vena cava, hepatic veins: The respirophasic change in diameter  
was 
less than 50%. -  Aortic valve: There was mild stenosis. The aortic valve area by the 
continuity equation was 1.47 cm2. SYSTEM MEASUREMENT TABLES 
 
2D mode AoR Diam (2D): 2.9 cm 
LA Dimension (2D): 4 cm Left Atrium Systolic Volume Index; Method of Disks, Biplane; 2D mode;: 25  
ml/m2 IVS/LVPW (2D): 1 IVSd (2D): 1.3 cm LVIDd (2D): 4.7 cm 
LVIDs (2D): 3.1 cm 
LVOT Area (2D): 3.1 cm2 LVPWd (2D): 1.3 cm RVIDd (2D): 3.1 cm Unspecified Scan Mode Peak Grad; Mean; Antegrade Flow: 20 mm[Hg] Vmax; Antegrade Flow: 203 cm/s LVOT Diam: 2 cm Prepared and signed by 
 
Radha Steven. MD [facility] Signed 18-Mar-2020 12:27:44 Xr Chest Sngl V Result Date: 3/18/2020 Portable chest x-ray CLINICAL INDICATION: Left-sided atelectasis or pleural effusion Finding: Single AP view the chest compared to a similar exam dated 3/17/2020 shows dense airspace consolidation the left mid and lower lung unchanged from the prior exam. The lungs are underexpanded. Post cardiac surgery changes are noted. A right-sided pacer device is in place. There is a left-sided dialysis catheter in place. IMPRESSION: No significant interval change. Assessment and Plan:  
 
Hospital Problems as of 3/18/2020 Date Reviewed: 3/9/2020 Codes Class Noted - Resolved POA Acute on chronic respiratory failure with hypoxia [facility]) ICD-10-CM: K06.66 
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 
ICD-9-CM: 250.40, 583.81  8/26/2019 - Present Yes Obesity, morbid ([facility] (Chronic) ICD-10-CM: E66.01 
ICD-9-CM: 278.01  10/19/2018 - Present Yes DM type 2 (diabetes mellitus, type 2) (HCC) (Chronic) ICD-10-CM: E11.9 ICD-9-CM: 250.00  1/14/2013 - Present Yes HLD (hyperlipidemia) (Chronic) ICD-10-CM: C82.5 ICD-9-CM: 272.4  1/14/2013 - Present Yes RESOLVED: Acute-on-chronic kidney injury ([facility] ICD-10-CM: N17.9, N18.9 ICD-9-CM: 584.9, 585.9  2/17/2020 - 2/25/2020 Yes RESOLVED: Atelectasis ICD-10-CM: J98.11 ICD-9-CM: 518.0  2/17/2020 - 2/25/2020 Yes RESOLVED: Fluid overload ICD-10-CM: E87.70 ICD-9-CM: 276.69  2/15/2020 - 2/25/2020 Yes RESOLVED: Pleural effusion ICD-10-CM: J90 ICD-9-CM: 511.9  2/3/2020 - 2/25/2020 Yes * (Principal) RESOLVED: Acute hypoxemic respiratory failure ([facility] ICD-10-CM: J96.01 
ICD-9-CM: 518.81  1/10/2020 - 2/25/2020 Yes RESOLVED: HTN (hypertension) ICD-10-CM: I10 
ICD-9-CM: 401.9  1/14/2013 - 2/25/2020 Yes Plan: 
Acute hypoxemic respiratory failure with Progressive LLL consolidation - ID on board: back on abx, vanc/cefepime EOT 3/13/2020.  
- S/p bronch 2/17. Pulm following. 
  
LUE pain 
- US : no DVT but Thrombosed superficial branch of the left cephalic vein. 
  
DEJUAN now on HD 
- HD per nephro 
  
DM2 
- sliding scale 
- holding Basal/prandial insulins with sugars reasonable though occasional spikes throughout the day 
  
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 
-Hold warfarin 
-Start argatroban on INR less than 2.5 to plan for thoracentesis per pulmonology S/P  Aortic  Valve Surgery   
 
DC planning/Dispo:   
 
 
Diet:  DIET DIABETIC CONSISTENT CARB 
DIET NUTRITIONAL SUPPLEMENTS 
DVT ppx:   
 
Signed: 
Aminata Ward MD

## 2020-03-19 NOTE — PROGRESS NOTES
Still awaiting insurance approval for patient to discharge to [facility].

## 2020-03-19 NOTE — PROGRESS NOTES
03/18/20 2115 Oxygen Therapy O2 Sat (%) 95 % Pulse via Oximetry 83 beats per minute O2 Device CPAP mask FIO2 (%) 35 % Respiratory Respiratory (WDL) X Respiratory Pattern Dyspnea at rest  
Chest/Tracheal Assessment Chest expansion, symmetrical  
Breath Sounds Bilateral Coarse Cough Non-productive CPAP/BIPAP  
CPAP/BIPAP Start/Stop On Device Mode CPAP  
$$ CPAP Daily Yes Mask Type and Size Full face Skin Condition intact PIP Observed 9 cm H20  
EPAP (cm H2O) 8 cm H2O Vt Spont (ml) 317 ml Ve Observed (l/min) 7.6 l/min Backup Rate 10 Total RR (Spontaneous) 24 breaths per minute Insp Rise Time (sec) 2 Leak (Estimated) 26 L/min  
Pt's Home Machine No  
Biomedical Check Performed Yes Settings Verified Yes Alarm Settings High Pressure 30 Low Pressure 2 Apnea 20 Low Ve 3 High Rate 50 Low Rate 10 Pulmonary Toilet Pulmonary Toilet H. O.B elevated

## 2020-03-19 NOTE — PROGRESS NOTES
Warfarin dosing per pharmacist 
 
Rosaura Macario is a 69 y.o. male. Height: 5' 10\" (177.8 cm)   Weight 103.5 kg (228 lbs) Indication:  AVR and afib Goal INR:  2.5 - 3.5 Home dose:  2.5 mg daily Risk factors or significant drug interactions: HIT+(SHARON positive on 1/18/20), amiodarone (dc'd 2/5), Levofloxacin (2/13- 2/20 ), mirtazapine (2/13-2/24), escitalopram (2/19-2/20), trazodone (started 2/24) Other anticoagulants: None Daily Monitoring Date  INR     Warfarin dose HGB              Notes 2/14  2.4  3 mg  8.7 2/15  2.2  4 mg  9.6 2/16  2.1  5 mg  9.8 2/17  2.1  5 mg  9.5 2/18  2.8  2 mg  9.3 2/19  2.5  2 mg  8.7 
2/20  1.9  3 mg   8.8 
2/21  1.9  4 mg  8.6 
2/22  2.1  3 mg  8.5 
2/23  1.9  4 mg  --- 
2/24  1.7  3 mg + 2 mg --- 
2/25  1.7  5 mg  --- 
2/26  1.6  6 mg  --- 
2/27  1.7  6 mg   --- 
2/28  1.7  7.5 mg  --- 
2/29  2.1 ` 6 mg  7.7 
3/1  2.0  7.5 mg  --- 
3/2  2.9  6 mg  --- 
3/3  3.4  5 mg  8.0 
3/4  2.7  7.5mg  8.6 
3/5  2.9  6 mg  8.0 
3/6  3.1  Held  8.4 
3/7  2.6  6 mg  8.0 
3/8  2.7  6mg  --- 
3/9  2.5  6 mg  --- 
3/10  2.7  6 mg  7.6 
3/11  3.3  5 mg  8.3 
3/12  4.3  Hold  8.2 
3/13  3.9  Hold  8.8 
3/14  3.5  Hold  --- 
3/15  2.8  Hold  --- 
3/16  2.5  Hold  --- 
3/17  3.1  6 mg  --- On argatroban 3/18  3.0  6 mg  10.3 On argatroban 3/19  4.1  5 mg  9.8 On argatroban 3/19  2.9  5 mg  9.8 Off argatroban for 2 hours Pulmonary performed thoracentesis on 3/17. Patient was bridged with argatroban. INR today was 4.1 with argatroban running. Drip held for 2 hours, repeat INR was 2.9. Argatroban drip has been discontinued. Will give 5 mg of warfarin this evening. Daily INR. Thank you, Andrey Vick, PharmD, [facility] 
[phone]

## 2020-03-19 NOTE — DIALYSIS
TRANSFER OUT- DIALYSIS Hemodialysis treatment completed without complications. Patient alert and VS stable  /66  P 84   
 
 2.5 Kgs removed. Flushed both ports with 10 mL of NS.  CVC dressing clean, dry, and intact, tego caps intact, bilateral lumens wrapped with 4x4 gauze. Meds given-None. No units of RBCs given during dialysis. Patient to 23-14-20-09 after dialysis.

## 2020-03-19 NOTE — PROGRESS NOTES
Pt returns to room from HD. Pt denies pain or distress at this time. Left perm cath dressing clean, dry, intact at this time. Call light in reach, door open will monitor. Pt on 4 L HF NC at this time.

## 2020-03-19 NOTE — DIALYSIS
TRANSFER IN - DIALYSIS Received patient in dialysis unit  from room 824 (unit) for ordered procedure. Consent verified for renal replacement therapy. Patient alert and vital signs stable. /85 P 82 Hemodialysis initiated using left perm cath. Aspirated and flushed both ports without difficulty. Dressing clean, dry and intact. Machine settings per MD order. Heparin 0 unit bolus and 0 units/hr. Will monitor during treatment.

## 2020-03-19 NOTE — PROGRESS NOTES
Hospitalist Note Admit Date:  2020  3:03 PM  
Name:  Mayco Leslie Age:  69 y.o. 
:  1945 MRN:  [id number] PCP:  Anthony Drummond MD 
Treatment Team: Attending Provider: Matti Reyes MD; Consulting Provider: Merline Colbert MD; Utilization Review: Danii Godwin RN; Hospitalist: Jaime Hogan NP; Hospitalist: Klarissa Santos NP; Consulting Provider: Deborah Yin MD; Consulting Provider: Yoselin Briscoe MD; Care Manager: William Lou.; Utilization Review: Sammi Barnett; Consulting Provider: Lux Chandler MD; Consulting Provider: Jude Schmitz MD; Consulting Provider: Matti Reyes MD; Primary Nurse: Mona Diaz RN; Physical Therapy Assistant: Radha Luevano PTA; Occupational Therapist: Marcy Denise, OTR/L 
 
HPI/Subjective:  
Patient is generally feeling slightly better but still has some shortness of breath and wheezing has had dialysis today Denies any chest pain or abdominal discomfort Currently has no fever has small amount of phlegm but no hemoptysis No other complaints Objective:  
 
Patient Vitals for the past 24 hrs: 
 Temp Pulse Resp BP SpO2  
20 1339     96 % 20 1200 97.5 °F (36.4 °C) 90 18 132/76 97 % 20 1136  84  114/66   
20 1103  86  104/47   
20 1032  84  135/76   
20 1000  82  128/68   
20 0926  86  99/60   
20 0859  82  107/77   
20 0829  60  136/55   
20 0802  80  116/69   
20 0746  82  137/85   
20 0730 98.2 °F (36.8 °C) (!) 103 18 141/86 94 % 20 0356 98.2 °F (36.8 °C) 84 18 115/64 95 % 20 0010 98.4 °F (36.9 °C) 84 19 143/66 96 % 20 2115     95 % 20     95 % 20 1958 98 °F (36.7 °C) 89 18 144/72 94 % 20 144 98.2 °F (36.8 °C) 87 19 134/68 92 % Oxygen Therapy O2 Sat (%): 96 % (20 1339) Pulse via Oximetry: 83 beats per minute (03/18/20 2115) O2 Device: Nasal cannula (03/19/20 1339) PEEP/CPAP (cm H2O): 4 cm H20 (03/18/20 2012) O2 Flow Rate (L/min): 4 l/min (03/19/20 1339) O2 Temperature: 87.8 °F (31 °C) (03/10/20 0618) FIO2 (%): 35 % (03/18/20 2115) Estimated body mass index is 35.54 kg/m² as calculated from the following: 
  Height as of this encounter: 5' 10\" (1.778 m). Weight as of this encounter: 112.4 kg (247 lb 11.2 oz). Intake/Output Summary (Last 24 hours) at 3/19/2020 1435 Last data filed at 3/19/2020 1136 Gross per 24 hour Intake 360 ml Output 2600 ml Net -2240 ml  
   
*Note that automatically entered I/Os may not be accurate; dependent on patient compliance with collection and accurate  by txtr. General:    Well nourished. Alert. Not acutely distressed CV:   RRR. No murmur, rub, or gallop. Lungs:   CTAB. Scattered wheezes Abdomen:   Soft, nontender, nondistended. Extremities: Warm and dry. No cyanosis or edema. ,  Scratch marks Skin:     No rashes or jaundice. Neuro:  No gross focal deficits, generalized weakness Data Review: 
I have reviewed all labs, meds, and studies from the last 24 hours: 
 
Recent Results (from the past 24 hour(s)) GLUCOSE, POC Collection Time: 03/18/20  4:21 PM  
Result Value Ref Range Glucose (POC) 285 (H) 65 - 100 mg/dL GLUCOSE, POC Collection Time: 03/18/20  8:10 PM  
Result Value Ref Range Glucose (POC) 305 (H) 65 - 100 mg/dL PROTHROMBIN TIME + INR Collection Time: 03/19/20  4:06 AM  
Result Value Ref Range Prothrombin time 41.2 (H) 12.0 - 14.7 sec INR 4.1 (HH) METABOLIC PANEL, BASIC Collection Time: 03/19/20  4:06 AM  
Result Value Ref Range Sodium 136 136 - 145 mmol/L Potassium 4.2 3.5 - 5.1 mmol/L Chloride 101 98 - 107 mmol/L  
 CO2 25 21 - 32 mmol/L Anion gap 10 7 - 16 mmol/L Glucose 114 (H) 65 - 100 mg/dL  BUN 34 (H) 8 - 23 MG/DL  
 Creatinine 5.08 (H) 0.8 - 1.5 MG/DL  
 GFR est AA 14 (L) >60 ml/min/1.73m2 GFR est non-AA 12 (L) >60 ml/min/1.73m2 Calcium 8.7 8.3 - 10.4 MG/DL  
HGB & HCT Collection Time: 03/19/20  4:06 AM  
Result Value Ref Range HGB 9.8 (L) 13.6 - 17.2 g/dL HCT 32.4 (L) 41.1 - 50.3 % PTT Collection Time: 03/19/20  4:06 AM  
Result Value Ref Range aPTT 63.7 (H) 24.3 - 35.4 SEC GLUCOSE, POC Collection Time: 03/19/20  5:25 AM  
Result Value Ref Range Glucose (POC) 139 (H) 65 - 100 mg/dL GLUCOSE, POC Collection Time: 03/19/20 10:37 AM  
Result Value Ref Range Glucose (POC) 122 (H) 65 - 100 mg/dL All Micro Results Procedure Component Value Units Date/Time CULTURE, RESPIRATORY/SPUTUM/BRONCH Sharran Sean STAIN [[id number]]  (Abnormal) Collected:  03/09/20 5024 Order Status:  Completed Specimen:  Sputum Updated:  03/12/20 5222 Special Requests: NO SPECIAL REQUESTS     
  GRAM STAIN 10 TO 50 WBC'S/OIF  
   0 TO 2 EPITHELIAL CELLS/OIF  
   FEW GRAM POSITIVE RODS MODERATE YEAST 4+ MUCUS PRESENT Culture result: MODERATE CANDIDA ALBICANS     
 CULTURE, BLOOD [[id number]] Collected:  03/06/20 0944 Order Status:  Completed Specimen:  Blood Updated:  03/11/20 2164 Special Requests: --     
  RIGHT Antecubital 
  
  Culture result: NO GROWTH 5 DAYS     
 CULTURE, BLOOD [[id number]] Collected:  03/06/20 9172 Order Status:  Completed Specimen:  Blood Updated:  03/11/20 5861 Special Requests: --     
  LEFT Antecubital 
  
  Culture result: NO GROWTH 5 DAYS     
 CULTURE, RESPIRATORY/SPUTUM/BRONCH Sharran Sean STAIN [[id number]] Collected:  03/06/20 1600 Order Status:  Canceled Specimen:  Sputum C. DIFFICILE AG & TOXIN A/B [[id number]] Order Status:  Canceled Specimen:  Stool CULTURE, RESPIRATORY/SPUTUM/BRONCH Troy Acharya [[id number]] Collected:  02/17/20 1220  Order Status:  Completed Specimen:  Sputum from Bronchial Washing Updated:  02/24/20 1244 Special Requests: NO SPECIAL REQUESTS     
  GRAM STAIN 20 TO 35 WBCS SEEN PER OIF  
   1 TO 2 EPITHELIAL CELLS SEEN PER OIF  
      
  MODERATE GRAM POSITIVE COCCI  
     
   FEW GRAM POSITIVE RODS Culture result:    
  LIGHT NORMAL RESPIRATORY BRENNAN Current Meds: 
Current Facility-Administered Medications Medication Dose Route Frequency  warfarin (COUMADIN) tablet 5 mg  5 mg Oral QPM  
 sertraline (ZOLOFT) tablet 100 mg  100 mg Oral DAILY  buPROPion [facility]) tablet 75 mg  75 mg Oral BID  argatroban 50 mg in 0.9% sodium chloride 50 mL (1000 mcg/mL) infusion  0.5-10 mcg/kg/min IntraVENous TITRATE  loratadine (CLARITIN) tablet 10 mg  10 mg Oral DAILY  diphenhydrAMINE (BENADRYL) capsule 25 mg  25 mg Oral Q6H PRN  mirtazapine (REMERON) tablet 15 mg  15 mg Oral QHS  ALPRAZolam (XANAX) tablet 0.5 mg  0.5 mg Oral Q6H PRN  
 furosemide (LASIX) injection 80 mg  80 mg IntraVENous BID  
 bacitracin 500 unit/gram ointment   Topical BID  insulin lispro (HUMALOG) injection   SubCUTAneous AC&HS  
 dextrose 40% (GLUTOSE) oral gel 1 Tube  15 g Oral PRN  
 glucagon (GLUCAGEN) injection 1 mg  1 mg IntraMUSCular PRN  
 dextrose (D50W) injection syrg 12.5-25 g  25-50 mL IntraVENous PRN  
 HYDROcodone-homatropine (HYCODAN) 5-1.5 mg/5 mL (5 mL) syrup 5 mL  5 mL Oral Q4H PRN  
 benzonatate (TESSALON) capsule 100 mg  100 mg Oral TID PRN  
 guaiFENesin ER (MUCINEX) tablet 1,200 mg  1,200 mg Oral Q12H  
 albuterol (PROVENTIL VENTOLIN) nebulizer solution 2.5 mg  2.5 mg Nebulization Q6H RT  
 insulin glargine (LANTUS) injection 5 Units  5 Units SubCUTAneous QHS  sodium chloride (OCEAN) 0.65 % nasal squeeze bottle 2 Spray  2 Spray Both Nostrils Q2H PRN  
 morphine injection 2 mg  2 mg IntraVENous Q6H PRN  
 sodium chloride 3% hypertonic nebulizer soln  4 mL Nebulization BID RT  
 sodium chloride (OCEAN) 0.65 % nasal squeeze bottle 2 Spray  2 Spray Both Nostrils BID  epoetin maritza-epbx (RETACRIT) 14,000 Units combo injection  14,000 Units SubCUTAneous Q7D  
 polyethylene glycol (MIRALAX) packet 17 g  17 g Oral DAILY  midodrine (PROAMITINE) tablet 10 mg  10 mg Oral TID WITH MEALS  tamsulosin (FLOMAX) capsule 0.4 mg  0.4 mg Oral QHS  loperamide (IMODIUM) capsule 4 mg  4 mg Oral QID PRN  pantothenic ac-min oil-pet,hyd (AQUAPHOR) 41 % ointment   Topical DAILY  traMADol (ULTRAM) tablet 50 mg  50 mg Oral Q6H PRN  
 sodium chloride (NS) flush 5-40 mL  5-40 mL IntraVENous PRN  
 acetaminophen (TYLENOL) tablet 650 mg  650 mg Oral Q4H PRN  
 naloxone (NARCAN) injection 0.4 mg  0.4 mg IntraVENous PRN  
 ondansetron (ZOFRAN) injection 4 mg  4 mg IntraVENous Q4H PRN  
 bisacodyL (DULCOLAX) tablet 5 mg  5 mg Oral DAILY PRN Other Studies: 
Results for orders placed or performed during the hospital encounter of 20  
2D ECHO COMPLETE ADULT (TTE) W OR WO CONTR Narrative [facility] [address] [place], [address] [facility] 
[phone] Transthoracic Echocardiogram 
2D, M-mode, Doppler, and Color Doppler Patient: Lyndon Almaguer 
MR #: [id number] : 1945 Age: 69 years Gender: Male Study date: 18-Mar-2020 Account #: [de-identified] Height: 69.7 in 
Weight: 219.6 lb 
BSA: 2.17 mï¾² Status:Routine Location: 824 BP: 85/ 55 Allergies: HEPARIN, AMOXICILLIN, CEPHALEXIN, PENICILLINS Sonographer:  Lillie November, [facility] Group:  [facility] Referring Physician:  Kuldip Calderon MD 
Reading Physician:  Satish De Oliveira MD [facility] INDICATIONS: Orthostatic Hypotension following CABG. *Patient scanned sitting up. Some limited windows. * PROCEDURE: This was a routine study. A transthoracic echocardiogram was 
performed. The study included complete 2D imaging, M-mode, complete spectral 
Doppler, and color Doppler. Intravenous contrast (Definity) was administered. Echocardiographic views were limited by restricted patient mobility and poor 
acoustic window availability. Image quality was adequate. LEFT VENTRICLE: Size was normal. Systolic function was normal. Ejection 
fraction was estimated in the range of 55 % to 60 %. The septal walls appear 
hypokinetic. Wall thickness was normal. There was mild concentric  
hypertrophy. The E/e' ratio was 12.6. Left ventricular diastolic function parameters were 
normal. 
 
VENTRICULAR SEPTUM: There was paradoxical motion. RIGHT VENTRICLE: The size and function appear normal from parasternal window. Difficult to assess apically. Systolic function was normal. The tricuspid jet 
envelope definition was inadequate for estimation of RV systolic pressure. LEFT ATRIUM: The atrium was mildly dilated. RIGHT ATRIUM: Not well visualized. SYSTEMIC VEINS: IVC: The inferior vena cava was normal in size. The 
respirophasic change in diameter was less than 50%. AORTIC VALVE: The valve was not well visualized. The aortic valve area by the 
continuity equation was 1.47 cm2. The peak velocity was 2.41 m/s. The mean 
pressure gradient was 12 mmHg. The peak pressure gradient was 23 mmHg. The 
DI=0.47. The AT=70ms. There was mild stenosis. There was no insufficiency. MITRAL VALVE: Valve structure was normal. There was no evidence for stenosis. There was trivial regurgitation. TRICUSPID VALVE: Not well visualized. There was no evidence for stenosis. There 
was no regurgitation. PULMONIC VALVE: Not well visualized. There was no evidence for stenosis. There 
was trivial regurgitation. PERICARDIUM: There was no pericardial effusion. AORTA: The root exhibited normal size. SUMMARY: 
 
-  Left ventricle: Systolic function was normal. Ejection fraction was 
estimated in the range of 55 % to 60 %. The septal walls appear hypokinetic. There was mild concentric hypertrophy. The E/e' ratio was 12.6. -  Ventricular septum: There was paradoxical motion. -  Left atrium: The atrium was mildly dilated. -  Inferior vena cava, hepatic veins: The respirophasic change in diameter  
was 
less than 50%. -  Aortic valve: There was mild stenosis. The aortic valve area by the 
continuity equation was 1.47 cm2. SYSTEM MEASUREMENT TABLES 
 
2D mode AoR Diam (2D): 2.9 cm 
LA Dimension (2D): 4 cm Left Atrium Systolic Volume Index; Method of Disks, Biplane; 2D mode;: 25  
ml/m2 IVS/LVPW (2D): 1 IVSd (2D): 1.3 cm LVIDd (2D): 4.7 cm 
LVIDs (2D): 3.1 cm 
LVOT Area (2D): 3.1 cm2 LVPWd (2D): 1.3 cm RVIDd (2D): 3.1 cm Unspecified Scan Mode Peak Grad; Mean; Antegrade Flow: 20 mm[Hg] Vmax; Antegrade Flow: 203 cm/s LVOT Diam: 2 cm Prepared and signed by 
 
Emma Harrison. MD [facility] Signed 18-Mar-2020 12:27:44 Xr Chest Sngl V Result Date: 3/19/2020 Clinical history: Follow-up. TECHNIQUE: AP portable chest COMPARISON: Yesterday. FINDINGS: Stable postsurgical changes of sternotomy. Right cardiac pacer and left dialysis catheter are stable. Heart is enlarged. There is persistent left lung base opacity. There is vascular congestion. No pneumothorax. IMPRESSION: 1. Persistent left lung base opacity, likely atelectasis or pneumonia. Assessment and Plan:  
 
Hospital Problems as of 3/19/2020 Date Reviewed: 3/9/2020 Codes Class Noted - Resolved POA Acute on chronic respiratory failure with hypoxia [facility]) ICD-10-CM: U93.99 
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 
ICD-9-CM: 250.40, 583.81  8/26/2019 - Present Yes Obesity, morbid ([facility] [address] (Chronic) ICD-10-CM: E66.01 
ICD-9-CM: 278.01  10/19/2018 - Present Yes DM type 2 (diabetes mellitus, type 2) (HCC) (Chronic) ICD-10-CM: E11.9 ICD-9-CM: 250.00  1/14/2013 - Present Yes HLD (hyperlipidemia) (Chronic) ICD-10-CM: G80.5 ICD-9-CM: 272.4  1/14/2013 - Present Yes RESOLVED: Acute-on-chronic kidney injury ([facility] [address] ICD-10-CM: N17.9, N18.9 ICD-9-CM: 584.9, 585.9  2/17/2020 - 2/25/2020 Yes RESOLVED: Atelectasis ICD-10-CM: J98.11 ICD-9-CM: 518.0  2/17/2020 - 2/25/2020 Yes RESOLVED: Fluid overload ICD-10-CM: E87.70 ICD-9-CM: 276.69  2/15/2020 - 2/25/2020 Yes RESOLVED: Pleural effusion ICD-10-CM: J90 ICD-9-CM: 511.9  2/3/2020 - 2/25/2020 Yes * (Principal) RESOLVED: Acute hypoxemic respiratory failure ([address] ICD-10-CM: J96.01 
ICD-9-CM: 518.81  1/10/2020 - 2/25/2020 Yes RESOLVED: HTN (hypertension) ICD-10-CM: I10 
ICD-9-CM: 401.9  1/14/2013 - 2/25/2020 Yes Plan: 
Acute hypoxemic respiratory failure with Progressive LLL consolidation 
-Seen by pulmonary and trying to improve the atelectasis off antibiotics - S/p bronch 2/17. Pulm following. Discussed with DR Veto robles - US : no DVT but Thrombosed superficial branch of the left cephalic vein. Pain has improved 
  
DEJUAN now on HD 
- HD per nephro has chronic kidney disease as well 
  
DM2 
- sliding scale 
- holding Basal/prandial insulins with sugars reasonable though occasional spikes throughout the day 
  
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 
-Hold warfarin Patient on warfarin INR being monitored off [employer] , discussed with pharmacy yesterday DC planning/Dispo:   
 
 
Diet:  DIET DIABETIC CONSISTENT CARB 
DIET NUTRITIONAL SUPPLEMENTS 
DVT ppx:   
 
Signed: 
Estiven Shaw MD

## 2020-03-19 NOTE — PROGRESS NOTES
[facility] PROGRESS NOTE Follow up for: DEJUAN/CKD Subjective:  
Patient seen and examined on HD, dialyzing via left  Qb, UF 3600, complaints of poor sleep overnight, tolerating UF. Labs and chart reviewed ROS: 
Gen - no fever, no chills, appetite okay CV - no chest pain, no orthopnea Lung - + excertional shortness of breath, no cough GI- no N/V/D Ext/Access - no edema. Left side TCC Objective:  
Exam: 
Vitals:  
 03/19/20 0356 03/19/20 0730 03/19/20 0746 03/19/20 0802 BP: 115/64 141/86 137/85 116/69 Pulse: 84 (!) 103 82 80 Resp: 18 18 Temp: 98.2 °F (36.8 °C) 98.2 °F (36.8 °C) SpO2: 95% 94% Weight:      
Height:      
 
 
 
Intake/Output Summary (Last 24 hours) at 3/19/2020 1579 Last data filed at 3/19/2020 0430 Gross per 24 hour Intake 600 ml Output 100 ml Net 500 ml Current Facility-Administered Medications Medication Dose Route Frequency  warfarin (COUMADIN) tablet 5 mg  5 mg Oral QPM  
 sertraline (ZOLOFT) tablet 100 mg  100 mg Oral DAILY  buPROPion [facility]) tablet 75 mg  75 mg Oral BID  argatroban 50 mg in 0.9% sodium chloride 50 mL (1000 mcg/mL) infusion  0.5-10 mcg/kg/min IntraVENous TITRATE  loratadine (CLARITIN) tablet 10 mg  10 mg Oral DAILY  diphenhydrAMINE (BENADRYL) capsule 25 mg  25 mg Oral Q6H PRN  mirtazapine (REMERON) tablet 15 mg  15 mg Oral QHS  ALPRAZolam (XANAX) tablet 0.5 mg  0.5 mg Oral Q6H PRN  
 furosemide (LASIX) injection 80 mg  80 mg IntraVENous BID  
 bacitracin 500 unit/gram ointment   Topical BID  insulin lispro (HUMALOG) injection   SubCUTAneous AC&HS  
 dextrose 40% (GLUTOSE) oral gel 1 Tube  15 g Oral PRN  
 glucagon (GLUCAGEN) injection 1 mg  1 mg IntraMUSCular PRN  
 dextrose (D50W) injection syrg 12.5-25 g  25-50 mL IntraVENous PRN  
 HYDROcodone-homatropine (HYCODAN) 5-1.5 mg/5 mL (5 mL) syrup 5 mL  5 mL Oral Q4H PRN  
 benzonatate (TESSALON) capsule 100 mg  100 mg Oral TID PRN  
  guaiFENesin ER (MUCINEX) tablet 1,200 mg  1,200 mg Oral Q12H  
 albuterol (PROVENTIL VENTOLIN) nebulizer solution 2.5 mg  2.5 mg Nebulization Q6H RT  
 insulin glargine (LANTUS) injection 5 Units  5 Units SubCUTAneous QHS  sodium chloride (OCEAN) 0.65 % nasal squeeze bottle 2 Spray  2 Spray Both Nostrils Q2H PRN  
 morphine injection 2 mg  2 mg IntraVENous Q6H PRN  
 sodium chloride 3% hypertonic nebulizer soln  4 mL Nebulization BID RT  
 sodium chloride (OCEAN) 0.65 % nasal squeeze bottle 2 Spray  2 Spray Both Nostrils BID  epoetin maritza-epbx (RETACRIT) 14,000 Units combo injection  14,000 Units SubCUTAneous Q7D  
 polyethylene glycol (MIRALAX) packet 17 g  17 g Oral DAILY  midodrine (PROAMITINE) tablet 10 mg  10 mg Oral TID WITH MEALS  tamsulosin (FLOMAX) capsule 0.4 mg  0.4 mg Oral QHS  loperamide (IMODIUM) capsule 4 mg  4 mg Oral QID PRN  pantothenic ac-min oil-pet,hyd (AQUAPHOR) 41 % ointment   Topical DAILY  traMADol (ULTRAM) tablet 50 mg  50 mg Oral Q6H PRN  
 sodium chloride (NS) flush 5-40 mL  5-40 mL IntraVENous PRN  
 acetaminophen (TYLENOL) tablet 650 mg  650 mg Oral Q4H PRN  
 naloxone (NARCAN) injection 0.4 mg  0.4 mg IntraVENous PRN  
 ondansetron (ZOFRAN) injection 4 mg  4 mg IntraVENous Q4H PRN  
 bisacodyL (DULCOLAX) tablet 5 mg  5 mg Oral DAILY PRN  
 
 
EXAM 
GEN - Alert, oriented, in no distress CV - regular rate, S1, S2, no Rub Lung - clear to auscultation bilaterally Abd - soft, nontender, BS present Ext/ Access - no edema, Left TCC- intact Recent Labs  
  03/19/20 
0406 03/18/20 
8989 HGB 9.8* 10.3* HCT 32.4* 33.3* Recent Labs  
  03/19/20 
0406 03/18/20 
2220 03/17/20 
0230  136 134* K 4.2 4.0 3.7  101 99 CO2 25 27 26 BUN 34* 23 49* CREA 5.08* 3.77* 5.11* CA 8.7 8.7 8.5 * 136* 139* Assessment and Plan:  
 
1) DEJUAN on CKD-  No sign of recovery.  HD dependent since Jan, '20- likely ESRD 
 -seen on HD, tolerating UF, catheter functioning well 2) Hypotension-  On midodrine prior to dialysis 3)  Hypoxic respiratory failure- on 3L O2, cxr low lung volumes with airspace consolidation left mid and lower lung. -ECHO 3/17 EF 55-60%, mild aortic stenosis 4) HIT positive- on argatroban Marcelina Jack, NP

## 2020-03-19 NOTE — PROGRESS NOTES
Problem: Falls - Risk of 
Goal: *Absence of Falls Description: Document Kar Wills Fall Risk and appropriate interventions in the flowsheet. Outcome: Progressing Towards Goal 
Note: Fall Risk Interventions: 
Mobility Interventions: Patient to call before getting OOB Mentation Interventions: Adequate sleep, hydration, pain control Medication Interventions: Teach patient to arise slowly Elimination Interventions: Call light in reach History of Falls Interventions: Door open when patient unattended

## 2020-03-19 NOTE — PROGRESS NOTES
PT note:  
 
Chart reviewed, spoke with nursing. Patient is off the floor for HD this AM.  Will check back on patient at a later date/time as schedule permits and patient is available for PT treatment.   
 
Mary Carmen Child, PTA

## 2020-03-19 NOTE — PROGRESS NOTES
Pt resting in bed with eyes closed. Pt on 4 L HF NC at this time. Call light in reach, door open will monitor.

## 2020-03-19 NOTE — PROGRESS NOTES
Vane Mak Admission Date: 2/13/2020 Daily Progress Note: 3/19/2020 The patient's chart is reviewed and the patient is discussed with the staff. 70 yo Willian Crocker a history of CAD s/p CABG with AVR 1/10/20 by Dr. Nash Brooks complicated by CKD requiring HD, HIT +, DVTs, wound left thigh and pneumonia.  Was discharged on Levaquin Q 48 hours last dose 2/11/20. Jair Burnett was discharged on NC 2 L but has been increased to NC 4L O2 at STR. He presented to the ER on 2/15 with increased SOB. CTA chest was negative for PE but persistent LLL opacity. He had a bronch 2/17 with negative cultures and was treated for PNA using levaquin. He is now ESRD and being followed by nephrology.  
  
  We were reconsulted on 3/6 for acute respiratory failure requiring bipap. CXR shows increased consolidation in the left base. He is febrile with a temp of 100.5. He has refused a swallow evaluation. ID is following and he was started on Vanc and Cefepime on 3/6. US 3/17 with no pleural effusion. Subjective:  
 
Patient reports some hoarse voice today but breathing seems stable. Was turned up to 5L O2 but back down to 4L. No new issues reported. Current Facility-Administered Medications Medication Dose Route Frequency  warfarin (COUMADIN) tablet 5 mg  5 mg Oral QPM  
 sertraline (ZOLOFT) tablet 100 mg  100 mg Oral DAILY  buPROPion [facility]) tablet 75 mg  75 mg Oral BID  argatroban 50 mg in 0.9% sodium chloride 50 mL (1000 mcg/mL) infusion  0.5-10 mcg/kg/min IntraVENous TITRATE  loratadine (CLARITIN) tablet 10 mg  10 mg Oral DAILY  diphenhydrAMINE (BENADRYL) capsule 25 mg  25 mg Oral Q6H PRN  mirtazapine (REMERON) tablet 15 mg  15 mg Oral QHS  ALPRAZolam (XANAX) tablet 0.5 mg  0.5 mg Oral Q6H PRN  
 furosemide (LASIX) injection 80 mg  80 mg IntraVENous BID  
 bacitracin 500 unit/gram ointment   Topical BID  
  insulin lispro (HUMALOG) injection   SubCUTAneous AC&HS  
 dextrose 40% (GLUTOSE) oral gel 1 Tube  15 g Oral PRN  
 glucagon (GLUCAGEN) injection 1 mg  1 mg IntraMUSCular PRN  
 dextrose (D50W) injection syrg 12.5-25 g  25-50 mL IntraVENous PRN  
 HYDROcodone-homatropine (HYCODAN) 5-1.5 mg/5 mL (5 mL) syrup 5 mL  5 mL Oral Q4H PRN  
 benzonatate (TESSALON) capsule 100 mg  100 mg Oral TID PRN  
 guaiFENesin ER (MUCINEX) tablet 1,200 mg  1,200 mg Oral Q12H  
 albuterol (PROVENTIL VENTOLIN) nebulizer solution 2.5 mg  2.5 mg Nebulization Q6H RT  
 insulin glargine (LANTUS) injection 5 Units  5 Units SubCUTAneous QHS  sodium chloride (OCEAN) 0.65 % nasal squeeze bottle 2 Spray  2 Spray Both Nostrils Q2H PRN  
 morphine injection 2 mg  2 mg IntraVENous Q6H PRN  
 sodium chloride 3% hypertonic nebulizer soln  4 mL Nebulization BID RT  
 sodium chloride (OCEAN) 0.65 % nasal squeeze bottle 2 Spray  2 Spray Both Nostrils BID  epoetin maritza-epbx (RETACRIT) 14,000 Units combo injection  14,000 Units SubCUTAneous Q7D  
 polyethylene glycol (MIRALAX) packet 17 g  17 g Oral DAILY  midodrine (PROAMITINE) tablet 10 mg  10 mg Oral TID WITH MEALS  tamsulosin (FLOMAX) capsule 0.4 mg  0.4 mg Oral QHS  loperamide (IMODIUM) capsule 4 mg  4 mg Oral QID PRN  pantothenic ac-min oil-pet,hyd (AQUAPHOR) 41 % ointment   Topical DAILY  traMADol (ULTRAM) tablet 50 mg  50 mg Oral Q6H PRN  
 sodium chloride (NS) flush 5-40 mL  5-40 mL IntraVENous PRN  
 acetaminophen (TYLENOL) tablet 650 mg  650 mg Oral Q4H PRN  
 naloxone (NARCAN) injection 0.4 mg  0.4 mg IntraVENous PRN  
 ondansetron (ZOFRAN) injection 4 mg  4 mg IntraVENous Q4H PRN  
 bisacodyL (DULCOLAX) tablet 5 mg  5 mg Oral DAILY PRN Review of Systems Constitutional: negative for fever, chills, sweats Cardiovascular: negative for chest pain, palpitations, syncope, edema Gastrointestinal: negative for dysphagia, reflux, vomiting, diarrhea, abdominal pain, or melena Neurologic: + generalized weakness. negative for focal weakness, numbness, headache Objective:  
 
Vitals:  
 03/19/20 1000 03/19/20 1032 03/19/20 1103 03/19/20 1136 BP: 128/68 135/76 104/47 114/66 Pulse: 82 84 86 84 Resp:      
Temp:      
SpO2:      
Weight:      
Height:      
 
Intake and Output:  
03/17 1901 - 03/19 0700 In: 56 [P.O.:840; I.V.:50] Out: 300 [Urine:300] 03/19 0701 - 03/19 1900 In: -  
Out: 2500 Physical Exam:  
Constitution:  the patient is well developed and in no acute distress EENMT:  Sclera clear, pupils equal, oral mucosa moist 
Respiratory: CTA B in anterior lung fields. Decreased at B bases L>R Cardiovascular:  RRR without M,G,R 
Gastrointestinal: soft and non-tender; with positive bowel sounds. Musculoskeletal: warm without cyanosis. There is no lower leg edema. Skin:  no jaundice or rashes, no wounds Neurologic: no gross neuro deficits Psychiatric:  alert and oriented x ppt CHEST XRAY:  
3/19/20 1. Persistent left lung base opacity, likely atelectasis or pneumonia. 3/18/20 IMPRESSION: No significant interval change. 3/17/20 IMPRESSION: Stable bibasilar infiltrates, left greater than right LAB No lab exists for component: [place] Recent Labs  
  03/19/20 
0406 03/18/20 
0438 03/17/20 
1456 HGB 9.8* 10.3*  --   
HCT 32.4* 33.3*  --   
INR 4.1* 3.0 2.0 Recent Labs  
  03/19/20 
0406 03/18/20 
3283 03/17/20 
1811  136 134* K 4.2 4.0 3.7  101 99 CO2 25 27 26 * 136* 139* BUN 34* 23 49* CREA 5.08* 3.77* 5.11* CA 8.7 8.7 8.5 ABG:  No results found for: PH, PHI, PCO2, PCO2I, PO2, PO2I, HCO3, HCO3I, FIO2, FIO2I Assessment:  (Medical Decision Making) Hospital Problems  Date Reviewed: 3/9/2020 Codes Class Noted POA Acute on chronic respiratory failure with hypoxia [facility]) ICD-10-CM: J96.21 
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 
ICD-9-CM: 250.40, 583.81  8/26/2019 Yes Obesity, morbid ([address] (Chronic) ICD-10-CM: E66.01 
ICD-9-CM: 278.01  10/19/2018 Yes DM type 2 (diabetes mellitus, type 2) (HCC) (Chronic) ICD-10-CM: E11.9 ICD-9-CM: 250.00  1/14/2013 Yes HLD (hyperlipidemia) (Chronic) ICD-10-CM: P00.4 ICD-9-CM: 272.4  1/14/2013 Yes Pt with recurrent respiratory failure. Feel like much of this is due to atelectasis/mucus plugging related to generalized weakness and debility. Does not appear to be related to pleural effusion. May be component of pulmonary edema with renal failure. TTE unimpressive. Plan:  (Medical Decision Making)  
-cont efforts to minimize atelectasis including vibralung, hypertonic saline nebs, and IS.  
-cont to wean o2 as sats allow.  
-Continue albuterol.  
-cont lasix, monitor UOP and getting HD per nephrology -s/p abx.   
 
 
Jersey Croft MD

## 2020-03-19 NOTE — PROGRESS NOTES
Occupational Therapy Note: 
Chart reviewed and OT treatment attempted. Patient off the floor at this time. Will check back as schedule permits and patient is available for occupational therapy treatment.   
Danielle Yadav, OTR/L

## 2020-03-19 NOTE — WOUND CARE
Coccyx/ gluteal cleft wound much improved, granulation in base, continue current treatment. Bilateral feet dry necrotic toes left 1,2,3 unchanged, dry stable eschar at tips, right 1, 2,3, 4 unchanged, dry stable eschar at tips, contnue betadine and dry dressings. Will monitor.

## 2020-03-19 NOTE — PROGRESS NOTES
Pt resting in bed with eyes closed. Pt awakens when spoken to. Pt  on 4 l HF NC at this time. Pt denies pain or distress at this time.  Wound to  sacrum and toes.  Pt encouraged to call for assistance if needed call light in reach, door open will monitor.

## 2020-03-20 NOTE — PROGRESS NOTES
Hospitalist Note Admit Date:  2020  3:03 PM  
Name:  Hossein Thomason Age:  69 y.o. 
:  1945 MRN:  [id number] PCP:  Savita Duran MD 
Treatment Team: Attending Provider: Isabel Vega MD; Consulting Provider: Sánchez Leahy MD; Utilization Review: Henry Klein RN; Hospitalist: Harjit Valle NP; Hospitalist: Jax Falcon NP; Consulting Provider: Deborah Yin MD; Consulting Provider: Lola Roca MD; Care Manager: Carolina Petit; Utilization Review: Caty Toth; Consulting Provider: Jonathan Marte MD; Consulting Provider: Flor Stewart MD; Consulting Provider: Isabel Vega MD; Physical Therapist: Paul Ramirez DPT; Occupational Therapist: Rachelle Feng, OTR/L; Consulting Provider: Marylen Gale, MD 
 
HPI/Subjective:  
Patient has been feeling weak and also made mention to the nurse that he wants to die His breathing is slightly better and denies any active pain His appetite is fair No other complaints Objective:  
 
Patient Vitals for the past 24 hrs: 
 Temp Pulse Resp BP SpO2  
20 1405 98 °F (36.7 °C) 88 20 118/78 92 % 20 1333     97 % 20 1059 98.1 °F (36.7 °C) 90 18 133/75 92 % 20 0750 97.5 °F (36.4 °C) 78 19 160/75 94 % 20 0744     98 % 20 0204     92 % 20 2328     95 % 20 2241 98.3 °F (36.8 °C) 85 18 132/71 95 % 20 1927 98.3 °F (36.8 °C) 82 20 116/66 96 % 20 1430 98.1 °F (36.7 °C) 87 18 158/89 97 % Oxygen Therapy O2 Sat (%): 92 % (20 1405) Pulse via Oximetry: 98 beats per minute (20 1333) O2 Device: Hi flow nasal cannula (20 1333) PEEP/CPAP (cm H2O): 4 cm H20 (20) O2 Flow Rate (L/min): 3 l/min (20 1333) O2 Temperature: 87.8 °F (31 °C) (03/10/20 0618) FIO2 (%): 35 % (20) Estimated body mass index is 34.41 kg/m² as calculated from the following: Height as of this encounter: 5' 10\" (1.778 m). Weight as of this encounter: 108.8 kg (239 lb 12.8 oz). Intake/Output Summary (Last 24 hours) at 3/20/2020 1427 Last data filed at 3/20/2020 1359 Gross per 24 hour Intake 820 ml Output 100 ml Net 720 ml  
   
*Note that automatically entered I/Os may not be accurate; dependent on patient compliance with collection and accurate  by techs. General:    Not in acute distress alert. CV:   RRR. No murmur, rub, or gallop. Lungs:   CTAB. Still some wheezing but improved Abdomen:   Soft, nontender, nondistended. Extremities: Warm and dry. No cyanosis or edema. Skin:     No rashes or jaundice. Neuro:  No gross focal deficits generalized weakness Data Review: 
I have reviewed all labs, meds, and studies from the last 24 hours: 
 
Recent Results (from the past 24 hour(s)) GLUCOSE, POC Collection Time: 03/19/20  4:15 PM  
Result Value Ref Range Glucose (POC) 228 (H) 65 - 100 mg/dL GLUCOSE, POC Collection Time: 03/19/20  9:10 PM  
Result Value Ref Range Glucose (POC) 224 (H) 65 - 100 mg/dL METABOLIC PANEL, BASIC Collection Time: 03/20/20  4:27 AM  
Result Value Ref Range Sodium 136 136 - 145 mmol/L Potassium 4.2 3.5 - 5.1 mmol/L Chloride 102 98 - 107 mmol/L  
 CO2 29 21 - 32 mmol/L Anion gap 5 (L) 7 - 16 mmol/L Glucose 147 (H) 65 - 100 mg/dL BUN 23 8 - 23 MG/DL Creatinine 3.80 (H) 0.8 - 1.5 MG/DL  
 GFR est AA 20 (L) >60 ml/min/1.73m2 GFR est non-AA 17 (L) >60 ml/min/1.73m2 Calcium 8.3 8.3 - 10.4 MG/DL  
HGB & HCT Collection Time: 03/20/20  4:27 AM  
Result Value Ref Range HGB 10.2 (L) 13.6 - 17.2 g/dL HCT 32.1 (L) 41.1 - 50.3 % PROTHROMBIN TIME + INR Collection Time: 03/20/20  4:27 AM  
Result Value Ref Range Prothrombin time 28.0 (H) 12.0 - 14.7 sec INR 2.5 PTT Collection Time: 03/20/20  4:27 AM  
Result Value Ref Range  aPTT 40.6 (H) 24.3 - 35.4 SEC  
 GLUCOSE, POC Collection Time: 03/20/20  5:53 AM  
Result Value Ref Range Glucose (POC) 154 (H) 65 - 100 mg/dL GLUCOSE, POC Collection Time: 03/20/20 11:12 AM  
Result Value Ref Range Glucose (POC) 254 (H) 65 - 100 mg/dL All Micro Results Procedure Component Value Units Date/Time CULTURE, RESPIRATORY/SPUTUM/BRONCH Stephane Oven STAIN [[id number]]  (Abnormal) Collected:  03/09/20 1872 Order Status:  Completed Specimen:  Sputum Updated:  03/12/20 3658 Special Requests: NO SPECIAL REQUESTS     
  GRAM STAIN 10 TO 50 WBC'S/OIF  
   0 TO 2 EPITHELIAL CELLS/OIF  
   FEW GRAM POSITIVE RODS MODERATE YEAST 4+ MUCUS PRESENT Culture result: MODERATE CANDIDA ALBICANS     
 CULTURE, BLOOD [[id number]] Collected:  03/06/20 0944 Order Status:  Completed Specimen:  Blood Updated:  03/11/20 3705 Special Requests: --     
  RIGHT Antecubital 
  
  Culture result: NO GROWTH 5 DAYS     
 CULTURE, BLOOD [[id number]] Collected:  03/06/20 3718 Order Status:  Completed Specimen:  Blood Updated:  03/11/20 6991 Special Requests: --     
  LEFT Antecubital 
  
  Culture result: NO GROWTH 5 DAYS     
 CULTURE, RESPIRATORY/SPUTUM/BRONCH Stephane Oven STAIN [[id number]] Collected:  03/06/20 1600 Order Status:  Canceled Specimen:  Sputum C. DIFFICILE AG & TOXIN A/B [[id number]] Order Status:  Canceled Specimen:  Stool CULTURE, RESPIRATORY/SPUTUM/BRONCH [place] [[id number]] Collected:  02/17/20 1220 Order Status:  Completed Specimen:  Sputum from Bronchial Washing Updated:  02/24/20 1244 Special Requests: NO SPECIAL REQUESTS     
  GRAM STAIN 20 TO 35 WBCS SEEN PER OIF  
   1 TO 2 EPITHELIAL CELLS SEEN PER OIF  
      
  MODERATE GRAM POSITIVE COCCI  
     
   FEW GRAM POSITIVE RODS Culture result:    
  LIGHT NORMAL RESPIRATORY BRENNAN Current Meds: 
Current Facility-Administered Medications Medication Dose Route Frequency  warfarin (COUMADIN) tablet 6 mg  6 mg Oral QPM  
 alum-mag hydroxide-simeth (MYLANTA) oral suspension 30 mL  30 mL Oral Q4H PRN  
 sertraline (ZOLOFT) tablet 100 mg  100 mg Oral DAILY  buPROPion [facility]) tablet 75 mg  75 mg Oral BID  loratadine (CLARITIN) tablet 10 mg  10 mg Oral DAILY  diphenhydrAMINE (BENADRYL) capsule 25 mg  25 mg Oral Q6H PRN  mirtazapine (REMERON) tablet 15 mg  15 mg Oral QHS  ALPRAZolam (XANAX) tablet 0.5 mg  0.5 mg Oral Q6H PRN  
 furosemide (LASIX) injection 80 mg  80 mg IntraVENous BID  
 bacitracin 500 unit/gram ointment   Topical BID  insulin lispro (HUMALOG) injection   SubCUTAneous AC&HS  
 dextrose 40% (GLUTOSE) oral gel 1 Tube  15 g Oral PRN  
 glucagon (GLUCAGEN) injection 1 mg  1 mg IntraMUSCular PRN  
 dextrose (D50W) injection syrg 12.5-25 g  25-50 mL IntraVENous PRN  
 HYDROcodone-homatropine (HYCODAN) 5-1.5 mg/5 mL (5 mL) syrup 5 mL  5 mL Oral Q4H PRN  
 benzonatate (TESSALON) capsule 100 mg  100 mg Oral TID PRN  
 guaiFENesin ER (MUCINEX) tablet 1,200 mg  1,200 mg Oral Q12H  
 albuterol (PROVENTIL VENTOLIN) nebulizer solution 2.5 mg  2.5 mg Nebulization Q6H RT  
 insulin glargine (LANTUS) injection 5 Units  5 Units SubCUTAneous QHS  sodium chloride (OCEAN) 0.65 % nasal squeeze bottle 2 Spray  2 Spray Both Nostrils Q2H PRN  
 morphine injection 2 mg  2 mg IntraVENous Q6H PRN  
 sodium chloride 3% hypertonic nebulizer soln  4 mL Nebulization BID RT  
 sodium chloride (OCEAN) 0.65 % nasal squeeze bottle 2 Spray  2 Spray Both Nostrils BID  epoetin maritza-epbx (RETACRIT) 14,000 Units combo injection  14,000 Units SubCUTAneous Q7D  
 polyethylene glycol (MIRALAX) packet 17 g  17 g Oral DAILY  midodrine (PROAMITINE) tablet 10 mg  10 mg Oral TID WITH MEALS  tamsulosin (FLOMAX) capsule 0.4 mg  0.4 mg Oral QHS  loperamide (IMODIUM) capsule 4 mg  4 mg Oral QID PRN  
  pantothenic ac-min oil-pet,hyd (AQUAPHOR) 41 % ointment   Topical DAILY  traMADol (ULTRAM) tablet 50 mg  50 mg Oral Q6H PRN  
 sodium chloride (NS) flush 5-40 mL  5-40 mL IntraVENous PRN  
 acetaminophen (TYLENOL) tablet 650 mg  650 mg Oral Q4H PRN  
 naloxone (NARCAN) injection 0.4 mg  0.4 mg IntraVENous PRN  
 ondansetron (ZOFRAN) injection 4 mg  4 mg IntraVENous Q4H PRN  
 bisacodyL (DULCOLAX) tablet 5 mg  5 mg Oral DAILY PRN Other Studies: 
Results for orders placed or performed during the hospital encounter of 20  
2D ECHO COMPLETE ADULT (TTE) W OR WO CONTR Narrative [facility] [address] [place], [address] [facility] 
[phone] Transthoracic Echocardiogram 
2D, M-mode, Doppler, and Color Doppler Patient: Elder Quinn 
MR #: [id number] : 1945 Age: 69 years Gender: Male Study date: 18-Mar-2020 Account #: [de-identified] Height: 69.7 in 
Weight: 219.6 lb 
BSA: 2.17 mï¾² Status:Routine Location: 824 BP: 85/ 55 Allergies: HEPARIN, AMOXICILLIN, CEPHALEXIN, PENICILLINS Sonographer:  Peri Parker RDCS Group:  [facility] Referring Physician:  Cristina Taveras MD 
Reading Physician:  Joi De Oliveira MD [facility] INDICATIONS: Orthostatic Hypotension following CABG. *Patient scanned sitting up. Some limited windows. * PROCEDURE: This was a routine study. A transthoracic echocardiogram was 
performed. The study included complete 2D imaging, M-mode, complete spectral 
Doppler, and color Doppler. Intravenous contrast (Definity) was administered. Echocardiographic views were limited by restricted patient mobility and poor 
acoustic window availability. Image quality was adequate. LEFT VENTRICLE: Size was normal. Systolic function was normal. Ejection 
fraction was estimated in the range of 55 % to 60 %. The septal walls appear 
hypokinetic. Wall thickness was normal. There was mild concentric hypertrophy. The E/e' ratio was 12.6. Left ventricular diastolic function parameters were 
normal. 
 
VENTRICULAR SEPTUM: There was paradoxical motion. RIGHT VENTRICLE: The size and function appear normal from parasternal window. Difficult to assess apically. Systolic function was normal. The tricuspid jet 
envelope definition was inadequate for estimation of RV systolic pressure. LEFT ATRIUM: The atrium was mildly dilated. RIGHT ATRIUM: Not well visualized. SYSTEMIC VEINS: IVC: The inferior vena cava was normal in size. The 
respirophasic change in diameter was less than 50%. AORTIC VALVE: The valve was not well visualized. The aortic valve area by the 
continuity equation was 1.47 cm2. The peak velocity was 2.41 m/s. The mean 
pressure gradient was 12 mmHg. The peak pressure gradient was 23 mmHg. The 
DI=0.47. The AT=70ms. There was mild stenosis. There was no insufficiency. MITRAL VALVE: Valve structure was normal. There was no evidence for stenosis. There was trivial regurgitation. TRICUSPID VALVE: Not well visualized. There was no evidence for stenosis. There 
was no regurgitation. PULMONIC VALVE: Not well visualized. There was no evidence for stenosis. There 
was trivial regurgitation. PERICARDIUM: There was no pericardial effusion. AORTA: The root exhibited normal size. SUMMARY: 
 
-  Left ventricle: Systolic function was normal. Ejection fraction was 
estimated in the range of 55 % to 60 %. The septal walls appear hypokinetic. There was mild concentric hypertrophy. The E/e' ratio was 12.6. 
 
-  Ventricular septum: There was paradoxical motion. -  Left atrium: The atrium was mildly dilated. -  Inferior vena cava, hepatic veins: The respirophasic change in diameter  
was 
less than 50%. -  Aortic valve: There was mild stenosis. The aortic valve area by the 
continuity equation was 1.47 cm2. SYSTEM MEASUREMENT TABLES 
 
2D mode AoR Diam (2D): 2.9 cm 
 LA Dimension (2D): 4 cm Left Atrium Systolic Volume Index; Method of Disks, Biplane; 2D mode;: 25  
ml/m2 IVS/LVPW (2D): 1 IVSd (2D): 1.3 cm LVIDd (2D): 4.7 cm 
LVIDs (2D): 3.1 cm 
LVOT Area (2D): 3.1 cm2 LVPWd (2D): 1.3 cm RVIDd (2D): 3.1 cm Unspecified Scan Mode Peak Grad; Mean; Antegrade Flow: 20 mm[Hg] Vmax; Antegrade Flow: 203 cm/s LVOT Diam: 2 cm Prepared and signed by 
 
Sheyla Acharya. MD [facility] Signed 18-Mar-2020 12:27:44 Xr Chest Sngl V Result Date: 3/20/2020 Clinical history: Follow-up TECHNIQUE: AP portable chest COMPARISON: March 19, 2020. FINDINGS: Right-sided cardiac pacer, left-sided dialysis catheter and sternotomy wires are stable. No pneumothorax. Heart is enlarged. Mediastinal contour is within normal limits. There is persistent left lung base opacity. IMPRESSION: 1. Persistent left lung base opacity, likely combination of small effusion and airspace disease (atelectasis or pneumonia). 2. No significant change compared to prior exam. 
 
 
Assessment and Plan:  
 
Hospital Problems as of 3/20/2020 Date Reviewed: 3/9/2020 Codes Class Noted - Resolved POA Acute on chronic respiratory failure with hypoxia [facility]) ICD-10-CM: R15.05 
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 
ICD-9-CM: 250.40, 583.81  8/26/2019 - Present Yes Obesity, morbid ([facility] (Chronic) ICD-10-CM: E66.01 
ICD-9-CM: 278.01  10/19/2018 - Present Yes DM type 2 (diabetes mellitus, type 2) (HCC) (Chronic) ICD-10-CM: E11.9 ICD-9-CM: 250.00  1/14/2013 - Present Yes HLD (hyperlipidemia) (Chronic) ICD-10-CM: L29.0 ICD-9-CM: 272.4  1/14/2013 - Present Yes RESOLVED: Acute-on-chronic kidney injury ([facility] ICD-10-CM: N17.9, N18.9 ICD-9-CM: 584.9, 585.9  2/17/2020 - 2/25/2020 Yes RESOLVED: Atelectasis ICD-10-CM: J98.11 ICD-9-CM: 518.0  2/17/2020 - 2/25/2020 Yes RESOLVED: Fluid overload ICD-10-CM: E87.70 ICD-9-CM: 276.69  2/15/2020 - 2/25/2020 Yes RESOLVED: Pleural effusion ICD-10-CM: J90 ICD-9-CM: 511.9  2/3/2020 - 2/25/2020 Yes * (Principal) RESOLVED: Acute hypoxemic respiratory failure ([facility] ICD-10-CM: J96.01 
ICD-9-CM: 518.81  1/10/2020 - 2/25/2020 Yes RESOLVED: HTN (hypertension) ICD-10-CM: I10 
ICD-9-CM: 401.9  1/14/2013 - 2/25/2020 Yes Plan: 
Acute hypoxemic respiratory failure with Progressive LLL consolidation 
-Seen by pulmonary and trying to improve the atelectasis off antibiotics - S/p bronch 2/17. Pulm following. Discussed with DR Priscilla Fischer, he is clear from this point review for discharge to rehab 
  
LUE pain 
- US : no DVT but Thrombosed superficial branch of the left cephalic vein. Pain has improved 
  
DEJUAN now on HD 
- HD per nephro has chronic kidney disease as well 
  
DM2 
- sliding scale 
- holding Basal/prandial insulins with sugars reasonable though occasional spikes throughout the day   
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 
-INR now therapeutic Suicidal ideations: Patient needs to be reevaluated by psychiatrist 
 
  
 
DC planning/Dispo:   
 
 
Diet:  DIET DIABETIC CONSISTENT CARB 
DIET NUTRITIONAL SUPPLEMENTS 
DVT ppx:   
 
Signed: 
Concepcion Montana MD

## 2020-03-20 NOTE — PROGRESS NOTES
Warfarin dosing per pharmacist 
 
Jenise Villalobos is a 69 y.o. male. Height: 5' 10\" (177.8 cm)   Weight 103.5 kg (228 lbs) Indication:  AVR and afib Goal INR:  2.5 - 3.5 Home dose:  2.5 mg daily Risk factors or significant drug interactions: HIT+(SHARON positive on 1/18/20), amiodarone (dc'd 2/5), Levofloxacin (2/13- 2/20 ), mirtazapine (2/13-2/24), escitalopram (2/19-2/20), trazodone (started 2/24) Other anticoagulants: None Daily Monitoring Date  INR     Warfarin dose HGB              Notes 2/14  2.4  3 mg  8.7 2/15  2.2  4 mg  9.6 2/16  2.1  5 mg  9.8 2/17  2.1  5 mg  9.5 2/18  2.8  2 mg  9.3 2/19  2.5  2 mg  8.7 
2/20  1.9  3 mg   8.8 
2/21  1.9  4 mg  8.6 
2/22  2.1  3 mg  8.5 
2/23  1.9  4 mg  --- 
2/24  1.7  3 mg + 2 mg --- 
2/25  1.7  5 mg  --- 
2/26  1.6  6 mg  --- 
2/27  1.7  6 mg   --- 
2/28  1.7  7.5 mg  --- 
2/29  2.1 ` 6 mg  7.7 
3/1  2.0  7.5 mg  --- 
3/2  2.9  6 mg  --- 
3/3  3.4  5 mg  8.0 
3/4  2.7  7.5mg  8.6 
3/5  2.9  6 mg  8.0 
3/6  3.1  Held  8.4 
3/7  2.6  6 mg  8.0 
3/8  2.7  6mg  --- 
3/9  2.5  6 mg  --- 
3/10  2.7  6 mg  7.6 
3/11  3.3  5 mg  8.3 
3/12  4.3  Hold  8.2 
3/13  3.9  Hold  8.8 
3/14  3.5  Hold  --- 
3/15  2.8  Hold  --- 
3/16  2.5  Hold  --- 
3/17  3.1  6 mg  --- On argatroban 3/18  3.0  6 mg  10.3 On argatroban 3/19  4.1  5 mg  9.8 On argatroban 3/19  2.9  5 mg  9.8 Off argatroban for 2 hours 3/20  2.5  6 mg  10.2 Pulmonary performed thoracentesis on 3/17. Patient was bridged with argatroban. INR today is therapeutic at 2.5, down slightly from yesterday. Will give 6 mg today. Daily INR. Thank you, Janelle Nobles, PharmD, [facility] Clinical Pharmacy Specialist 
[phone]

## 2020-03-20 NOTE — PROGRESS NOTES
Assumed care of patient. Assessment completed and documented, see docflow. Patient resting quietly in bed. NAD noted at present. Respirations even and non labored. Call light within reach. Will continue to monitor.

## 2020-03-20 NOTE — PROGRESS NOTES
Shift assessment completed. Refer to flow sheet for details. Pt denies pain and other needs at this time. No acute distress noted. Toes are necrotic. Dressings intact to bilateral feet and sacrum. Bed in low and locked position. Call light within reach.

## 2020-03-20 NOTE — PROGRESS NOTES
[facility] PROGRESS NOTE Follow up for: DEJUAN/CKD Subjective:  
Patient seen and examined. No complaints. ROS: 
Gen - no fever, no chills, appetite okay CV - no chest pain, no orthopnea Lung - no shortness of breath, no cough GI- no N/V/D Ext/Access - no edema Objective:  
Exam: 
Vitals:  
 03/20/20 0482 03/20/20 5234 03/20/20 0750 03/20/20 3530 BP:   160/75 Pulse:   78 Resp:   19 Temp:   97.5 °F (36.4 °C) SpO2: 92% 98% 94% Weight:    108.8 kg (239 lb 12.8 oz) Height:      
 
 
 
Intake/Output Summary (Last 24 hours) at 3/20/2020 1021 Last data filed at 3/19/2020 1847 Gross per 24 hour Intake 240 ml Output 2600 ml Net -2360 ml  
 
 
Current Facility-Administered Medications Medication Dose Route Frequency  warfarin (COUMADIN) tablet 6 mg  6 mg Oral QPM  
 alum-mag hydroxide-simeth (MYLANTA) oral suspension 30 mL  30 mL Oral Q4H PRN  
 sertraline (ZOLOFT) tablet 100 mg  100 mg Oral DAILY  buPROPion [facility]) tablet 75 mg  75 mg Oral BID  loratadine (CLARITIN) tablet 10 mg  10 mg Oral DAILY  diphenhydrAMINE (BENADRYL) capsule 25 mg  25 mg Oral Q6H PRN  mirtazapine (REMERON) tablet 15 mg  15 mg Oral QHS  ALPRAZolam (XANAX) tablet 0.5 mg  0.5 mg Oral Q6H PRN  
 furosemide (LASIX) injection 80 mg  80 mg IntraVENous BID  
 bacitracin 500 unit/gram ointment   Topical BID  insulin lispro (HUMALOG) injection   SubCUTAneous AC&HS  
 dextrose 40% (GLUTOSE) oral gel 1 Tube  15 g Oral PRN  
 glucagon (GLUCAGEN) injection 1 mg  1 mg IntraMUSCular PRN  
 dextrose (D50W) injection syrg 12.5-25 g  25-50 mL IntraVENous PRN  
 HYDROcodone-homatropine (HYCODAN) 5-1.5 mg/5 mL (5 mL) syrup 5 mL  5 mL Oral Q4H PRN  
 benzonatate (TESSALON) capsule 100 mg  100 mg Oral TID PRN  
 guaiFENesin ER (MUCINEX) tablet 1,200 mg  1,200 mg Oral Q12H  
 albuterol (PROVENTIL VENTOLIN) nebulizer solution 2.5 mg  2.5 mg Nebulization Q6H RT  
  insulin glargine (LANTUS) injection 5 Units  5 Units SubCUTAneous QHS  sodium chloride (OCEAN) 0.65 % nasal squeeze bottle 2 Spray  2 Spray Both Nostrils Q2H PRN  
 morphine injection 2 mg  2 mg IntraVENous Q6H PRN  
 sodium chloride 3% hypertonic nebulizer soln  4 mL Nebulization BID RT  
 sodium chloride (OCEAN) 0.65 % nasal squeeze bottle 2 Spray  2 Spray Both Nostrils BID  epoetin maritza-epbx (RETACRIT) 14,000 Units combo injection  14,000 Units SubCUTAneous Q7D  
 polyethylene glycol (MIRALAX) packet 17 g  17 g Oral DAILY  midodrine (PROAMITINE) tablet 10 mg  10 mg Oral TID WITH MEALS  tamsulosin (FLOMAX) capsule 0.4 mg  0.4 mg Oral QHS  loperamide (IMODIUM) capsule 4 mg  4 mg Oral QID PRN  pantothenic ac-min oil-pet,hyd (AQUAPHOR) 41 % ointment   Topical DAILY  traMADol (ULTRAM) tablet 50 mg  50 mg Oral Q6H PRN  
 sodium chloride (NS) flush 5-40 mL  5-40 mL IntraVENous PRN  
 acetaminophen (TYLENOL) tablet 650 mg  650 mg Oral Q4H PRN  
 naloxone (NARCAN) injection 0.4 mg  0.4 mg IntraVENous PRN  
 ondansetron (ZOFRAN) injection 4 mg  4 mg IntraVENous Q4H PRN  
 bisacodyL (DULCOLAX) tablet 5 mg  5 mg Oral DAILY PRN  
 
 
EXAM 
GEN - Alert, oriented, in no distress CV - regular rate, S1, S2, no Rub Lung - clear to auscultation bilaterally Abd - soft, nontender, BS present Ext/ Access - no edema, Left TCC- intact Recent Labs  
  03/20/20 0427 03/19/20 
0406 03/18/20 
9985 HGB 10.2* 9.8* 10.3* HCT 32.1* 32.4* 33.3* Recent Labs  
  03/20/20 0427 03/19/20 
0406 03/18/20 
0250  136 136  
K 4.2 4.2 4.0  
 101 101 CO2 29 25 27 BUN 23 34* 23  
CREA 3.80* 5.08* 3.77* CA 8.3 8.7 8.7 * 114* 136* Assessment and Plan:  
 
1) DEJUAN on CKD-  No sign of recovery. HD dependent since Jan, '20- likely ESRD 
-Next HD tomorrow 2) Hypotension-  On midodrine prior to dialysis 3)  Hypoxic respiratory failure- on 3L O2, cxr low lung volumes with airspace consolidation left mid and lower lung. 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

## 2020-03-20 NOTE — PROGRESS NOTES
Dr Michael Santana notified via [employer] in regards to patient statement of \"wanting to die\" No new orders received.

## 2020-03-20 NOTE — PROGRESS NOTES
Ángel Terry Admission Date: 2/13/2020 Daily Progress Note: 3/20/2020 The patient's chart is reviewed and the patient is discussed with the staff. 68 yo Sophie Richard a history of CAD s/p CABG with AVR 1/10/20 by Dr. Cammy Arauz complicated by CKD requiring HD, HIT +, DVTs, wound left thigh and pneumonia.  Was discharged on Levaquin Q 48 hours last dose 2/11/20. [facility] was discharged on NC 2 L but has been increased to NC 4L O2 at [facility]. He presented to the ER on 2/15 with increased SOB. CTA chest was negative for PE but persistent LLL opacity. He had a bronch 2/17 with negative cultures and was treated for PNA using levaquin. He is now ESRD and being followed by nephrology.  
  
  We were reconsulted on 3/6 for acute respiratory failure requiring bipap. CXR shows increased consolidation in the left base. He is febrile with a temp of 100.5. He has refused a swallow evaluation. ID is following and he was started on Vanc and Cefepime on 3/6. US 3/17 with no pleural effusion. Subjective:  
 
Patient tells me his breathing is doing better. Now down to 3L O2. No new issues. Current Facility-Administered Medications Medication Dose Route Frequency  warfarin (COUMADIN) tablet 6 mg  6 mg Oral QPM  
 alum-mag hydroxide-simeth (MYLANTA) oral suspension 30 mL  30 mL Oral Q4H PRN  
 sertraline (ZOLOFT) tablet 100 mg  100 mg Oral DAILY  buPROPion [facility]) tablet 75 mg  75 mg Oral BID  loratadine (CLARITIN) tablet 10 mg  10 mg Oral DAILY  diphenhydrAMINE (BENADRYL) capsule 25 mg  25 mg Oral Q6H PRN  mirtazapine (REMERON) tablet 15 mg  15 mg Oral QHS  ALPRAZolam (XANAX) tablet 0.5 mg  0.5 mg Oral Q6H PRN  
 furosemide (LASIX) injection 80 mg  80 mg IntraVENous BID  
 bacitracin 500 unit/gram ointment   Topical BID  insulin lispro (HUMALOG) injection   SubCUTAneous AC&HS  
 dextrose 40% (GLUTOSE) oral gel 1 Tube  15 g Oral PRN  
  glucagon (GLUCAGEN) injection 1 mg  1 mg IntraMUSCular PRN  
 dextrose (D50W) injection syrg 12.5-25 g  25-50 mL IntraVENous PRN  
 HYDROcodone-homatropine (HYCODAN) 5-1.5 mg/5 mL (5 mL) syrup 5 mL  5 mL Oral Q4H PRN  
 benzonatate (TESSALON) capsule 100 mg  100 mg Oral TID PRN  
 guaiFENesin ER (MUCINEX) tablet 1,200 mg  1,200 mg Oral Q12H  
 albuterol (PROVENTIL VENTOLIN) nebulizer solution 2.5 mg  2.5 mg Nebulization Q6H RT  
 insulin glargine (LANTUS) injection 5 Units  5 Units SubCUTAneous QHS  sodium chloride (OCEAN) 0.65 % nasal squeeze bottle 2 Spray  2 Spray Both Nostrils Q2H PRN  
 morphine injection 2 mg  2 mg IntraVENous Q6H PRN  
 sodium chloride 3% hypertonic nebulizer soln  4 mL Nebulization BID RT  
 sodium chloride (OCEAN) 0.65 % nasal squeeze bottle 2 Spray  2 Spray Both Nostrils BID  epoetin maritza-epbx (RETACRIT) 14,000 Units combo injection  14,000 Units SubCUTAneous Q7D  
 polyethylene glycol (MIRALAX) packet 17 g  17 g Oral DAILY  midodrine (PROAMITINE) tablet 10 mg  10 mg Oral TID WITH MEALS  tamsulosin (FLOMAX) capsule 0.4 mg  0.4 mg Oral QHS  loperamide (IMODIUM) capsule 4 mg  4 mg Oral QID PRN  pantothenic ac-min oil-pet,hyd (AQUAPHOR) 41 % ointment   Topical DAILY  traMADol (ULTRAM) tablet 50 mg  50 mg Oral Q6H PRN  
 sodium chloride (NS) flush 5-40 mL  5-40 mL IntraVENous PRN  
 acetaminophen (TYLENOL) tablet 650 mg  650 mg Oral Q4H PRN  
 naloxone (NARCAN) injection 0.4 mg  0.4 mg IntraVENous PRN  
 ondansetron (ZOFRAN) injection 4 mg  4 mg IntraVENous Q4H PRN  
 bisacodyL (DULCOLAX) tablet 5 mg  5 mg Oral DAILY PRN Review of Systems Constitutional: negative for fever, chills, sweats Cardiovascular: negative for chest pain, palpitations, syncope, edema Gastrointestinal: negative for dysphagia, reflux, vomiting, diarrhea, abdominal pain, or melena Neurologic: + generalized weakness. negative for focal weakness, numbness, headache Objective:  
 
Vitals:  
 03/20/20 5882 03/20/20 7405 03/20/20 0750 03/20/20 0934 BP:   160/75 Pulse:   78 Resp:   19 Temp:   97.5 °F (36.4 °C) SpO2: 92% 98% 94% Weight:    239 lb 12.8 oz (108.8 kg) Height:      
 
Intake and Output:  
03/18 1901 - 03/20 0700 In: 480 [P.O.:480] Out: 2700 [Urine:200] No intake/output data recorded. Physical Exam:  
Constitution:  the patient is well developed and in no acute distress EENMT:  Sclera clear, pupils equal, oral mucosa moist 
Respiratory: CTA B in anterior lung fields. Decreased at B bases L>R Cardiovascular:  RRR without M,G,R 
Gastrointestinal: soft and non-tender; with positive bowel sounds. Musculoskeletal: warm without cyanosis. There is no lower leg edema. Skin:  no jaundice or rashes, no wounds Neurologic: no gross neuro deficits Psychiatric:  alert and oriented x ppt CHEST XRAY:  
3/20/20 1. Persistent left lung base opacity, likely combination of small effusion and 
airspace disease (atelectasis or pneumonia). 2. No significant change compared to prior exam. 
 
 
3/19/20 1. Persistent left lung base opacity, likely atelectasis or pneumonia. LAB No lab exists for component: [place] Recent Labs  
  03/20/20 
0427 03/19/20 
1425 03/19/20 
0406 03/18/20 
1959 HGB 10.2*  --  9.8* 10.3* HCT 32.1*  --  32.4* 33.3* INR 2.5 2.9 4.1* 3.0 Recent Labs  
  03/20/20 
0427 03/19/20 
0406 03/18/20 
1248  136 136  
K 4.2 4.2 4.0  
 101 101 CO2 29 25 27 * 114* 136* BUN 23 34* 23  
CREA 3.80* 5.08* 3.77* CA 8.3 8.7 8.7 ABG:  No results found for: PH, PHI, PCO2, PCO2I, PO2, PO2I, HCO3, HCO3I, FIO2, FIO2I Assessment:  (Medical Decision Making) Hospital Problems  Date Reviewed: 3/9/2020 Codes Class Noted POA Acute on chronic respiratory failure with hypoxia [facility]) ICD-10-CM: J96.21 
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 
ICD-9-CM: 250.40, 583.81  8/26/2019 Yes Obesity, morbid ([address] (Chronic) ICD-10-CM: E66.01 
ICD-9-CM: 278.01  10/19/2018 Yes DM type 2 (diabetes mellitus, type 2) (HCC) (Chronic) ICD-10-CM: E11.9 ICD-9-CM: 250.00  1/14/2013 Yes HLD (hyperlipidemia) (Chronic) ICD-10-CM: I63.5 ICD-9-CM: 272.4  1/14/2013 Yes Pt with recurrent respiratory failure. Feel like much of this is due to atelectasis/mucus plugging related to generalized weakness and debility. Does not appear to be related to pleural effusion. May be component of pulmonary edema with renal failure. TTE unimpressive. O2 needs are gradually improving back towards his admission O2 levels. Plan:  (Medical Decision Making)  
-cont efforts to minimize atelectasis including vibralung, hypertonic saline nebs, and IS.  
-cont to wean o2 as sats allow.  
-Continue albuterol.  
-cont lasix, monitor UOP and getting HD per nephrology -s/p abx.  
-seems ready for discharge from pulmonary standpoint.  Would send out with albuterol nebs and flutter valve with IS to try and prevent recurrence as outpatient.   
 
 
Valerio Fleming MD

## 2020-03-20 NOTE — PROGRESS NOTES
Problem: Mobility Impaired (Adult and Pediatric) Goal: *Acute Goals and Plan of Care (Insert Text) Description LTG: (Reviewed and updated 3/13/20) (1.)Mr. Lani Hicks will move from supine to sit and sit to supine , scoot up and down and roll side to side INDEPENDENTLY within 7 treatment day(s) from flat surface. (2.)Mr. Lani Hicks will transfer from bed to chair and chair to bed with STAND BY ASSIST using the least restrictive device within 7 treatment day(s). (3.)Mr. Lani Hicks will ambulate with CONTACT GUARD ASSIST for 75+ feet with the least restrictive device within 7 treatment day(s) while maintaining normal vital signs. (4.)Mr. Lani Hicks will perform exercises per HEP for 10+ minutes to improve strength and mobility within 7 days. ____________________________________________________________________________________________ Outcome: Progressing Towards Goal 
  
PHYSICAL THERAPY: Daily Note and PM 3/20/2020 INPATIENT: PT Visit Days : 4 Payor: Ramiro Macedo / Plan: [employer] / Product Type: [employer] /   
  
NAME/AGE/GENDER: Carlos Chilel is a 69 y.o. male PRIMARY DIAGNOSIS: Acute respiratory failure ([facility] [J96.00] Fluid overload [E87.70] Acute hypoxemic respiratory failure (HCC) Acute hypoxemic respiratory failure (HCC) Procedure(s) (LRB): ULTRASOUND (Bilateral) 3 Days Post-Op ICD-10: Treatment Diagnosis:  
 · Generalized Muscle Weakness (M62.81) · Difficulty in walking, Not elsewhere classified (R26.2) Precaution/Allergies: 
Heparin; Amoxicillin; Keflex [cephalexin]; and Pcn [penicillins] ASSESSMENT:  
 
Mr. Lani Hicks presents in supine without specific complaints; he is agreeable to therapy treatment. Impulsive throughout session and needs frequent redirection to stay on task and follow cues.  Worked on bed mobility, seated and standing static/dynamic balance at edge of bed, in chair, and in standing. Practiced transfers x many reps with cueing for hand/foot placement and technique. Pt stood at most for 25 seconds with reminders to work on upright posture and head/neck positioning and attention to task, keeping eyes open during static/dynamic activities. Several short gait trials in room using walker and with chair follow for safety due to often needing to sit quickly. Worked on toilet transfers, standing functional activities at sink to address strength, balance, and activity tolerance. Pt fatigues quickly each time in standing and needs many rest breaks + encouragement. Pt performs unsupported seated tasks including reaching outside base of support to address trunk control, posture, and balance. C/o feeling that Augie Cardinal are crossing whenever I stand up\" and does report some blurry/double vision. May benefit from vestibular assessment. Pt up to chair after activity with needs in reach. Overall showing slow progress; was able to increase standing duration, reps, and overall therapy session length with many short transfer trials and gait trials as well as standing activities. Will continue with therapy efforts per plan of care. Needs rehab at [place]. At this time, patient is appropriate for Co-treatment with occupational therapy due to patient's decreased overall endurance/tolerance levels, as well as need for high level skilled assistance to complete functional transfers/mobility and functional tasks. Wilda Eyad is appropriate for a multidisciplinary co-treatment of PT and OT to address goals of both disciplines. This section established at most recent assessment PROBLEM LIST (Impairments causing functional limitations): 1. Decreased Strength 2. Decreased ADL/Functional Activities 3. Decreased Transfer Abilities 4. Decreased Ambulation Ability/Technique 5. Decreased Balance 6. Decreased Activity Tolerance 7. Increased Fatigue 8. Increased Shortness of Breath 9. Edema/Girth INTERVENTIONS PLANNED: (Benefits and precautions of physical therapy have been discussed with the patient.) 1. Balance Exercise 2. Bed Mobility 3. Family Education 4. Gait Training 5. Home Exercise Program (HEP) 6. Neuromuscular Re-education/Strengthening 7. Therapeutic Activites 8. Therapeutic Exercise/Strengthening 9. Transfer Training TREATMENT PLAN: Frequency/Duration: 3 times a week for duration of hospital stay Rehabilitation Potential For Stated Goals: Good REHAB RECOMMENDATIONS (at time of discharge pending progress):   
Placement: It is my opinion, based on this patient's performance to date, that Mr. Jose Manuel Poe may benefit from intensive therapy at a [address] [place] [address] after discharge due to the functional deficits listed above that are likely to improve with skilled rehabilitation and concerns that he/she may be unsafe to be unsupervised at home due to a fall risk, safety concerns for transfers and ambulation at home. Equipment: ? To be determined HISTORY:  
History of Present Injury/Illness (Reason for Referral): Mr. 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 Was DC 2/12/20 from CABG and aortic valve replacement complicated by CKD requiring HD, HIT +, DVTs, wound left thigh on wound vac, pneumonia DC on levaquin Q 48 hours last dose 2/11/20. He was DC on 2 L O2 however has been increased to 4L O2 via NC at STR yesterday when he arrived. Today he was at HD and reports dizziness with sitting up and acute onset of SOB. Son at bedside and states pt was doing ok since DC yesterday until today at HD. INR 2.4.   CXR showed improving interstitial edema, unchanged left basilar opacity and left pleural effusion. Pt given lasix in ED. Pt is SOB when talking in short sentences. Denies CP, n/v.  Has had diarrhea however is not new since hospitalization. Past Medical History/Comorbidities:  
Mr. Chau Hays  has a past medical history of Arthritis, BPH (benign prostatic hyperplasia) (1/14/2013), CAD (coronary artery disease), DM type 2 (diabetes mellitus, type 2) ([facility] (dx 2004), Dyspnea, Gout (1/14/2013), HLD (hyperlipidemia) (1/14/2013), HTN (hypertension), Morbid obesity ([facility] (9/3/14), Psychiatric disorder, Rheumatic fever, Seasonal allergic rhinitis, Severe aortic valve stenosis, Thyroid disease, and Unspecified sleep apnea (2016). Mr. Chau Hays  has a past surgical history that includes hx tonsil and adenoidectomy; hx heart catheterization (12/23/2019); hx orthopaedic (Left); hx cataract removal (Bilateral, 2012); and ir insert tunl cvc w port over 5 years (2/4/2020). Social History/Living Environment:  
Home Environment: Private residence # Steps to Enter: 2 Rails to Enter: No 
One/Two Story Residence: One story Living Alone: No 
Support Systems: Spouse/Significant Other/Partner Patient Expects to be Discharged to[de-identified] Rehabilitation facility Current DME Used/Available at Home: Cane, straight, Walker, rolling Tub or Shower Type: Shower Prior Level of Function/Work/Activity: 
Lives with spouse, admit from rehab; has been using RW short distance ambulation, assist ADLs Personal Factors:   
      Sex:  male Age:  69 y.o. Overall Behavior:  agreeable Number of Personal Factors/Comorbidities that affect the Plan of Care: 
CAD, DM, DVTs, age 3+: HIGH COMPLEXITY EXAMINATION:  
Most Recent Physical Functioning:  
Gross Assessment: 
AROM: Within functional limits Strength: Generally decreased, functional 
Coordination: Within functional limits Posture: 
Posture (WDL): Exceptions to [facility] Posture Assessment: Forward head, Rounded shoulders, Trunk flexion Balance: Sitting: Impaired Sitting - Static: Good (unsupported) Sitting - Dynamic: Fair (occasional) Standing: Impaired Standing - Static: Fair Standing - Dynamic : Fair Bed Mobility: 
Supine to Sit: Minimum assistance Scooting: Minimum assistance Wheelchair Mobility: 
  
Transfers: 
Sit to Stand: Minimum assistance Stand to Sit: Minimum assistance Bed to Chair: Minimum assistance Interventions: Verbal cues; Visual cues; Safety awareness training; Tactile cues;Manual cues Duration: 57 Minutes Gait: 
  
Base of Support: Widened;Center of gravity altered Speed/Federica: Pace decreased (<100 feet/min) Step Length: Left shortened;Right shortened Gait Abnormalities: Trunk sway increased;Decreased step clearance;Shuffling gait Distance (ft): 6 Feet (ft)(many short gait trials/reps) Assistive Device: Walker, rolling Ambulation - Level of Assistance: Contact guard assistance;Minimal assistance Interventions: Verbal cues; Safety awareness training; Tactile cues;Manual cues Body Structures Involved: 1. Heart 2. Lungs 3. Muscles Body Functions Affected: 1. Cardio 2. Respiratory 3. Movement Related Activities and Participation Affected: 1. General Tasks and Demands 2. Mobility 3. Self Care Number of elements that affect the Plan of Care: 4+: HIGH COMPLEXITY CLINICAL PRESENTATION:  
Presentation: Evolving clinical presentation with changing clinical characteristics: MODERATE COMPLEXITY CLINICAL DECISION MAKIN [facility] [address] [address] AM-PAC 6 Clicks Basic Mobility Inpatient Short Form How much difficulty does the patient currently have. .. Unable A Lot A Little None 1. Turning over in bed (including adjusting bedclothes, sheets and blankets)? [] 1   [] 2   [x] 3   [] 4  
2. Sitting down on and standing up from a chair with arms ( e.g., wheelchair, bedside commode, etc.)   [] 1   [] 2   [x] 3   [] 4  
3. Moving from lying on back to sitting on the side of the bed?    [] 1   [x] 2   [] 3   [] 4  
 How much help from another person does the patient currently need. .. Total A Lot A Little None 4. Moving to and from a bed to a chair (including a wheelchair)? [] 1   [] 2   [x] 3   [] 4  
5. Need to walk in hospital room? [] 1   [] 2   [x] 3   [] 4  
6. Climbing 3-5 steps with a railing? [x] 1   [] 2   [] 3   [] 4  
© 2007, Trustees of [facility] [employer], under license to [employer]. All rights reserved Score:  Initial: 17 Most Recent: 15 (Date: 3/13/20 ) Interpretation of Tool:  Represents activities that are increasingly more difficult (i.e. Bed mobility, Transfers, Gait). Medical Necessity:    
· Patient is expected to demonstrate progress in  
· strength, range of motion, balance, and coordination ·  to  
· decrease assistance required with overall functional mobility, transfers, ambulation · . · Patient demonstrates · good ·  rehab potential due to higher previous functional level. Reason for Services/Other Comments: 
· Patient · continues to require present interventions due to patient's inability to perform bed mobility, transfers, ambulation safely and effectively · . Use of outcome tool(s) and clinical judgement create a POC that gives a: Clear prediction of patient's progress: LOW COMPLEXITY  
  
 
 
 
TREATMENT:  
(In addition to Assessment/Re-Assessment sessions the following treatments were rendered) Pre-treatment Symptoms/Complaints:  \"you wanna walk? \" 
Pain: Initial:  
Pain Intensity 1: 0  Post Session: 0/10 Therapeutic Activity: (57 minutes ):  Therapeutic activities including Bed mobility, seated static/dynamic activities, sit-stand transfer training x many reps, standing static and dynamic mobility and functional activities, toilet transfers, gait training x many reps for short distance and Chair transfers to improve mobility, strength, balance, coordination and activity tolerance. Required minimal Verbal cues; Safety awareness training; Tactile cues;Manual cues to promote static and dynamic balance in standing and promote motor control of bilateral, lower extremity(s). Today's treatment session addressed Decreased Strength, Decreased ADL/Functional Activities, Decreased Ambulation Ability/Technique, Decreased Activity Tolerance and fluctuations in BP to progress towards achieving goal(s) 1, 2 and 3. During this session, Occupational Therapy addressed ADLs to progress towards their discipline specific goal(s). Co-treatment was necessary to improve patient's ability to increase activity demands and ability to return to normal functional activity. Date: 
3/13/20 Date: 
 Date: Activity/Exercise Parameters Parameters Parameters LAQ 15x AB Seated marching 15x AB Ankle pumps 15x AB Seated hip aBd     
     
     
     
A=active, B=bilateral 
 
Braces/Orthotics/Lines/Etc:  
· Nasal cannula Treatment/Session Assessment:   
· Response to Treatment:   Slow progress · Interdisciplinary Collaboration:  
o Physical Therapist 
o Occupational Therapist 
o Registered Nurse · After treatment position/precautions:  
o Up in chair 
o Bed/Chair-wheels locked 
o Bed in low position 
o Call light within reach 
o RN notified · Compliance with Program/Exercises: Compliant all of the time · Recommendations/Intent for next treatment session: \"Next visit will focus on advancements to more challenging activities\" as tolerated Total Treatment Duration: PT Patient Time In/Time Out Time In: 4409 Time Out: 1226 Yaquelin Arambula DPT

## 2020-03-20 NOTE — PROGRESS NOTES
CM left a vm with patient's son. Awaiting auth for patient to discharge to [facility].

## 2020-03-20 NOTE — PROGRESS NOTES
Problem: Self Care Deficits Care Plan (Adult) Goal: *Acute Goals and Plan of Care (Insert Text) Description Goals per re-evaluation on 3/10/2020: 
1. Patient will complete lower body bathing and dressing with Min A and adaptive equipment as needed. 2. Patient will complete toileting with Min A and adaptive equipment as needed. PROGRESSING 3/20/2020 3. Patient will complete bed mobility from supine to sit with CGA and one verbal cue for placement of UE to assist. PROGRESSING 3/20/2020 4. Patient will complete functional transfer from sit to stand with SBA and use of adaptive equipment as needed. PROGRESSING 3/20/2020 5. Patient will complete grooming standing at sink level with SBA and use of adaptive equipment as needed. PROGRESSING 3/20/2020 6. Patient will perform pursed-lip breathing with one verbal and one visual to increase activity tolerance for performance of seated and standing ADL tasks. PROGRESSING 3/20/2020 Timeframe: 7 visits Outcome: Progressing Towards Goal 
 
OCCUPATIONAL THERAPY: Daily Note and AM  
 3/20/2020 INPATIENT: OT Visit Days: 3 Payor: Kinza Beaver / Plan: Efrain Peñaloza / Product Type: [employer] /  
  
NAME/AGE/GENDER: Jess Rico is a 69 y.o. male PRIMARY DIAGNOSIS:  Acute respiratory failure ([facility] [address] [J96.00] Fluid overload [E87.70] Acute hypoxemic respiratory failure (HCC) Acute hypoxemic respiratory failure (HCC) Procedure(s) (LRB): ULTRASOUND (Bilateral) 3 Days Post-Op ICD-10: Treatment Diagnosis:  
 · Generalized Muscle Weakness (M62.81) · Difficulty in walking, Not elsewhere classified (R26.2) · History of falling (Z91.81) Precautions/Allergies: 
   Heparin; Amoxicillin; Keflex [cephalexin]; and Pcn [penicillins] ASSESSMENT:  
 
Mr. Merlinda Och  is a 69 y.o. male admitted from [address] [place] [address] (s/p CABG) with acute respiratory failure. Hx SI.  At baseline lives with wife and reports independence to modified independence ADLs, IADLs, and ambulation without AD. Has had falls during this hospital admission. 3/20/2020: Pt found supine in bed upon arrival, alert and agreeable to OT treatment. Pt practiced bed mobility with Elo/cueing for technique. Fair sitting balance. Pt practiced several functional transfer trials throughout with SBA-Elo, frequent cueing for technique/safety, for ADLs. Brief change with total assist and rest breaks throughout as pt unable to tolerate standing for more than a few seconds at a time. Pt practiced toileting with SBA for melo hygiene, [address] [place] for bowel hygiene. Cued throughout for breathing technique and posture. Grooming in sitting with SBA-set up. Throughout session pt reports dizziness, double vision, his eyes crossing in standing, and his vision going white. May benefit from vestibular consult. Impulsivity and decreased command following noted at times. Pt left seated in chair on wound cushion with call bell within reach. Pt is making slow progress towards goals. See above. Continue OT POC. This patient is appropriate for co-treatment at this time due to multiple deficits including dizziness, decreased balance, decreased cognition/safety awareness, decreased endurance/activity tolerance, decreased strength, and need for high level assistance to complete functional transfers and functional tasks. This section established at most recent assessment PROBLEM LIST (Impairments causing functional limitations): 1. Decreased Strength 2. Decreased ADL/Functional Activities 3. Decreased Transfer Abilities 4. Decreased Ambulation Ability/Technique 5. Decreased Balance 6. Increased Pain 7. Decreased Activity Tolerance 8. Decreased Pacing Skills 9. Increased Fatigue 10. Increased Shortness of Breath 11. Decreased Skin Integrity/Hygeine INTERVENTIONS PLANNED: (Benefits and precautions of occupational therapy have been discussed with the patient.) 1. Activities of daily living training 2. Adaptive equipment training 3. Balance training 4. Clothing management 5. Hygiene training 6. Neuromuscular re-eduation 7. Re-evaluation 8. Therapeutic activity 9. Therapeutic exercise TREATMENT PLAN: Frequency/Duration: Follow patient 3x/week to address above goals. Rehabilitation Potential For Stated Goals: Fair REHAB RECOMMENDATIONS (at time of discharge pending progress):   
Placement: It is my opinion, based on this patient's performance to date, that Mr. Mario Segovia may benefit from intensive therapy at a [address] [place] [address] after discharge due to the functional deficits listed above that are likely to improve with skilled rehabilitation and concerns that he/she may be unsafe to be unsupervised at home due to decreased independence, safety, balance,and activity tolerance. .  
Equipment: ? TBD   
    
 
 
 
OCCUPATIONAL PROFILE AND HISTORY:  
History of Present Injury/Illness (Reason for Referral): 
See H & P Past Medical History/Comorbidities:  
Mr. Mario Segovia  has a past medical history of Arthritis, BPH (benign prostatic hyperplasia) (1/14/2013), CAD (coronary artery disease), DM type 2 (diabetes mellitus, type 2) ([facility] [address] (dx 2004), Dyspnea, Gout (1/14/2013), HLD (hyperlipidemia) (1/14/2013), HTN (hypertension), Morbid obesity ([address] (9/3/14), Psychiatric disorder, Rheumatic fever, Seasonal allergic rhinitis, Severe aortic valve stenosis, Thyroid disease, and Unspecified sleep apnea (2016). Mr. Mario Segovia  has a past surgical history that includes hx tonsil and adenoidectomy; hx heart catheterization (12/23/2019); hx orthopaedic (Left); hx cataract removal (Bilateral, 2012); and ir insert tunl cvc w port over 5 years (2/4/2020). Social History/Living Environment:  
Home Environment: Private residence # Steps to Enter: 2 Rails to Enter: No 
One/Two Story Residence: One story Living Alone: No 
Support Systems: Spouse/Significant Other/Partner Patient Expects to be Discharged to[de-identified] Rehabilitation facility Current DME Used/Available at Home: Cane, straight, Walker, rolling Tub or Shower Type: Shower Prior Level of Function/Work/Activity: At baseline, patient lives with wife in one story home with 2 [address]AR[address]. Wife home and available 24/7 if needed. Pt is typically Mod I to independent for performance of ADLs and IADLs. Pt typically uses no DME for in home or community mobility, but does have [facility] available if needed. Reports that he is able to walk approximately 200 feet without any DME before having to stop to take a rest break due to becoming SOB. Pt reports that he has multiple falls during acute care stay and vividly describes a fall he had off the bedside commode. Personal Factors:   
      Sex:  male Age:  69 y.o. Number of Personal Factors/Comorbidities that affect the Plan of Care: Extensive review of physical, cognitive, and psychosocial performance (3+):  HIGH COMPLEXITY ASSESSMENT OF OCCUPATIONAL PERFORMANCE[de-identified]  
Activities of Daily Living:  
Basic ADLs (From Assessment) Complex ADLs (From Assessment) Feeding: Minimum assistance Oral Facial Hygiene/Grooming: Minimum assistance Bathing: Moderate assistance Upper Body Dressing: Minimum assistance Lower Body Dressing: Moderate assistance Toileting: Moderate assistance Instrumental ADL Meal Preparation: Total assistance Homemaking: Total assistance Grooming/Bathing/Dressing Activities of Daily Living Grooming Position Performed: Seated in chair Washing Face: Stand-by assistance Washing Hands: Stand-by assistance;Set-up Brushing Teeth: Stand-by assistance;Set-up Feeding Drink to Mouth: Independent Toileting Bowel Hygiene: Moderate assistance Clothing Management: Total assistance (dependent) Adaptive Equipment: Walker;Grab bars Functional Transfers Bathroom Mobility: Minimum assistance Toilet Transfer : Minimum assistance Bed/Mat Mobility Rolling: Contact guard assistance Supine to Sit: Minimum assistance Sit to Stand: Minimum assistance Stand to Sit: Minimum assistance Bed to Chair: Minimum assistance Scooting: Minimum assistance Most Recent Physical Functioning:  
Gross Assessment: 
  
         
  
Posture: 
Posture (WDL): Exceptions to [facility] Posture Assessment: Forward head, Rounded shoulders, Trunk flexion Balance: 
Sitting: Impaired Sitting - Static: Good (unsupported) Sitting - Dynamic: Fair (occasional) Standing: Impaired Standing - Static: Fair Standing - Dynamic : Fair Bed Mobility: 
Rolling: Contact guard assistance Supine to Sit: Minimum assistance Scooting: Minimum assistance Wheelchair Mobility: 
  
Transfers: 
Sit to Stand: Minimum assistance Stand to Sit: Minimum assistance Bed to Chair: Minimum assistance Interventions: Verbal cues; Visual cues; Safety awareness training; Tactile cues;Manual cues Duration: 57 Minutes Patient Vitals for the past 6 hrs: 
 BP SpO2 O2 Flow Rate (L/min) Pulse 20 1059 133/75 92 %  90  
20 1333  97 % 3 l/min   
20 1405 118/78 92 %  88 Mental Status Neurologic State: Alert Orientation Level: Oriented X4 Cognition: Follows commands Perception: Appears intact Perseveration: No perseveration noted Safety/Judgement: Fall prevention Physical Skills Involved: 
1. Balance 2. Strength 3. Activity Tolerance 4. Fine Motor Control 5. Vision 6. Pain (acute) 7. Skin Integrity Cognitive Skills Affected (resulting in the inability to perform in a timely and safe manner): 1. Perception 2. Sustained Attention 3. Divided Attention Psychosocial Skills Affected: 1. Habits/Routines 2. Environmental Adaptation 3. Social Interaction Number of elements that affect the Plan of Care: 5+:  HIGH COMPLEXITY CLINICAL DECISION MAKIN [facility] [address] [id number] AM-PAC 6 Clicks Daily Activity Inpatient Short Form How much help from another person does the patient currently need. .. Total A Lot A Little None 1. Putting on and taking off regular lower body clothing? [] 1   [x] 2   [] 3   [] 4  
2. Bathing (including washing, rinsing, drying)? [] 1   [x] 2   [] 3   [] 4  
3. Toileting, which includes using toilet, bedpan or urinal?   [] 1   [x] 2   [] 3   [] 4  
4. Putting on and taking off regular upper body clothing? [] 1   [] 2   [x] 3   [] 4  
5. Taking care of personal grooming such as brushing teeth? [] 1   [] 2   [x] 3   [] 4  
6. Eating meals? [] 1   [] 2   [x] 3   [] 4  
© 2007, Trustees of [address] [place] [address] [address] [address], under license to [employer]. All rights reserved Score:  Initial: 15, completed, 2/15/2020 Most Recent: 15 completed, (Date: 3/10/2020) Interpretation of Tool:  Represents activities that are increasingly more difficult (i.e. Bed mobility, Transfers, Gait). Medical Necessity:    
· Skilled intervention continues to be required due to decreased independence, safety, balance, and activity tolerance. In addition, pt having increased dizziness that is limiting pt ability to perform ADLs and functional mobility. Jayro Orozco Reason for Services/Other Comments: 
· Patient continues to require skilled intervention due to medical complications and patient unable to attend/participate in therapy as expected. Use of outcome tool(s) and clinical judgement create a POC that gives a: MODERATE COMPLEXITY  
 
 
 
TREATMENT:  
(In addition to Assessment/Re-Assessment sessions the following treatments were rendered) Pre-treatment Symptoms/Complaints:   
Pain: Initial:  
Pain Intensity 1:0 Post Session:  Unchanged Today's treatment session addressed Decreased Strength, Decreased ADL/Functional Activities, Decreased Transfer Abilities, Decreased Ambulation Ability/Technique, Decreased Balance, Decreased Activity Tolerance, Decreased Pacing Skills, Decreased Work Simplification/Energy Conservation Techniques, Increased Fatigue, Increased Shortness of Breath, Decreased Skin Integrity/Hygeine, Decreased [place] with Home Exercise Program and Decreased Cognition to progress towards achieving goal(s). During this session,  Physical Therapy addressed  Functional Transfers to progress towards their discipline specific goal(s). Co-treatment was necessary to improve patient's cognitive participation, ability to follow higher level commands, ability to increase activity demands and ability to return to normal functional activity. Self Care: (57 minutes): Procedure(s) (per grid) utilized to improve and/or restore self-care/home management as related to toileting and grooming. Required minimal to maximal visual, verbal, manual and tactile cueing to facilitate activities of daily living skills and compensatory activities. Pt practiced bed mobility with Elo/cueing for technique. Fair sitting balance. Pt practiced several functional transfer trials throughout with SBA-Elo, frequent cueing for technique/safety, for ADLs. Brief change with total assist and rest breaks throughout as pt unable to tolerate standing for more than a few seconds at a time. Pt practiced toileting with SBA for melo hygiene, [address] [place] for bowel hygiene. Cued throughout for breathing technique and posture. Grooming in sitting with SBA-set up. Throughout session pt reports dizziness, double vision, his eyes crossing in standing, and his vision going white. May benefit from vestibular consult. Impulsivity and decreased command following noted at times. Braces/Orthotics/Lines/Etc:  
· IV 
· O2 Device: Nasal cannula (High Flow) Treatment/Session Assessment:   
Response to Treatment: Decreased tolerance, good participation · Interdisciplinary Collaboration:  
o Physical Therapist 
o Occupational Therapist 
o Registered Nurse · After treatment position/precautions:  
o Up in chair 
o Bed/Chair-wheels locked 
o Bed in low position o Call light within reach 
o RN notified · Compliance with Program/Exercises: Compliant most of the time, Will assess as treatment progresses. · Recommendations/Intent for next treatment session: \"Next visit will focus on advancements to more challenging activities and reduction in assistance provided\". Total Treatment Duration: OT Patient Time In/Time Out Time In: 7516 Time Out: 1226 Danielle Yadav, OTR/L

## 2020-03-21 NOTE — PROGRESS NOTES
Necrotic toes on both feet cleaned with wound , betadine, then 4x4's placed between toes and both feet wrapped with radha.

## 2020-03-21 NOTE — DIALYSIS
TRANSFER OUT- DIALYSIS Hemodialysis treatment completed without complications. Patient alert and VS stable  /74  P 89   
 
 3 Kgs removed. Flushed both ports with 10 mL of NS.  CVC dressing clean, dry, and intact, tego caps intact, bilateral lumens wrapped with 4x4 gauze. Meds given-None. No units of RBCs given during dialysis. Patient to 23-14-20-09 after dialysis.

## 2020-03-21 NOTE — DIALYSIS
TRANSFER IN - DIALYSIS Received patient in dialysis unit  from [facility] (unit) for ordered procedure. Consent verified for renal replacement therapy. Patient alert and vital signs stable. /63 P 79 Hemodialysis initiated using Left CVC. Aspirated and flushed both ports without difficulty. Dressing clean, dry and intact. Machine settings per MD order. Heparin 0 unit bolus and 0 units/hr. Will monitor during treatment.

## 2020-03-21 NOTE — PROGRESS NOTES
Received bedside shift report from off going nurse, Hiral Gonzalez. Patient resting in bed quietly. Respirations even and unlabored. Bed low and locked. Bedside table, personal belongings, and call lights all within reach.

## 2020-03-21 NOTE — PROGRESS NOTES
Patient resting in bed quietly. Respirations even and unlabored on 3 L via nasal cannula. Bed low and locked. Bedside table, personal belongings and call light all within reach. Preparing to give bedside shift report to oncoming nurse.

## 2020-03-21 NOTE — PROGRESS NOTES
Nancy Desir. Informed  Patient evening blood glucose 419. Schedule to receive 5 units Lantus and per sliding scale to receive 15 units Humalog and call MD. Would you like to add additional coverage? Please advise

## 2020-03-21 NOTE — PROGRESS NOTES
Problem: Mobility Impaired (Adult and Pediatric) Goal: *Acute Goals and Plan of Care (Insert Text) Description LTG: (Reviewed and updated 3/13/20) (1.)Mr. Antonio Keyes will move from supine to sit and sit to supine , scoot up and down and roll side to side INDEPENDENTLY within 7 treatment day(s) from flat surface. (2.)Mr. Antonio Keyes will transfer from bed to chair and chair to bed with STAND BY ASSIST using the least restrictive device within 7 treatment day(s). (3.)Mr. Antonio Keyes will ambulate with CONTACT GUARD ASSIST for 75+ feet with the least restrictive device within 7 treatment day(s) while maintaining normal vital signs. (4.)Mr. Antonio Keyes will perform exercises per HEP for 10+ minutes to improve strength and mobility within 7 days. ____________________________________________________________________________________________ Outcome: Progressing Towards Goal 
  
PHYSICAL THERAPY: Daily Note and AM 3/21/2020 INPATIENT: PT Visit Days : 5 Payor: [facility] / Plan: Ebony Encinas / Product Type: [employer] Medicare /   
  
NAME/AGE/GENDER: Ab Correa is a 69 y.o. male PRIMARY DIAGNOSIS: [facility] [J96.00] Fluid overload [E87.70] Acute hypoxemic respiratory failure (HCC) Acute hypoxemic respiratory failure (HCC) Procedure(s) (LRB): ULTRASOUND (Bilateral) 4 Days Post-Op ICD-10: Treatment Diagnosis:  
 · Generalized Muscle Weakness (M62.81) · Difficulty in walking, Not elsewhere classified (R26.2) Precaution/Allergies: 
Heparin; Amoxicillin; Keflex [cephalexin]; and Pcn [penicillins] ASSESSMENT:  
 
Mr. Antonio Keyes presents in supine without specific complaints; he is agreeable to therapy treatment. Impulsive throughout session and needs frequent redirection to stay on task and follow cues. He reports feeling weak from just returnign back to room after dialysis.   Worked on bed mobility, seated and standing static/dynamic balance at edge of bed. BP in sitting 103/67, pt reports dizziness of lightheaded nature. When sitting and standing he described dizziness as wobbly. Attempted x3 to get standing BP, but pt standing tolerance was 3sec with no reasoning as to poor standing tolerance. He was sitting for several minutes in between standing trials with no reports of symptoms getting worse. Pt also feeling nauseas and attempted to vomit including self induced vomiting by sticking finger down throat, but no production. Eventually went BTB. Pt refused any further PT. Overall showing slow progress; was able to increase standing duration, reps, and overall therapy session length with many short transfer trials and gait trials as well as standing activities. Will continue with therapy efforts per plan of care. Needs rehab at NH. At this time, patient is appropriate for Co-treatment with occupational therapy due to patient's decreased overall endurance/tolerance levels, as well as need for high level skilled assistance to complete functional transfers/mobility and functional tasks. Wilda Castano is appropriate for a multidisciplinary co-treatment of PT and OT to address goals of both disciplines. This section established at most recent assessment PROBLEM LIST (Impairments causing functional limitations): 1. Decreased Strength 2. Decreased ADL/Functional Activities 3. Decreased Transfer Abilities 4. Decreased Ambulation Ability/Technique 5. Decreased Balance 6. Decreased Activity Tolerance 7. Increased Fatigue 8. Increased Shortness of Breath 9. Edema/Girth INTERVENTIONS PLANNED: (Benefits and precautions of physical therapy have been discussed with the patient.) 1. Balance Exercise 2. Bed Mobility 3. Family Education 4. Gait Training 5. Home Exercise Program (HEP) 6. Neuromuscular Re-education/Strengthening 7. Therapeutic Activites 8. Therapeutic Exercise/Strengthening 9. Transfer Training TREATMENT PLAN: Frequency/Duration: 3 times a week for duration of hospital stay Rehabilitation Potential For Stated Goals: Good REHAB RECOMMENDATIONS (at time of discharge pending progress):   
Placement: It is my opinion, based on this patient's performance to date, that Mr. Anabel Moreland may benefit from intensive therapy at a [address] [place] [address] after discharge due to the functional deficits listed above that are likely to improve with skilled rehabilitation and concerns that he/she may be unsafe to be unsupervised at home due to a fall risk, safety concerns for transfers and ambulation at home. Equipment: ? To be determined HISTORY:  
History of Present Injury/Illness (Reason for Referral): Mr. 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 Was DC 2/12/20 from CABG and aortic valve replacement complicated by CKD requiring HD, HIT +, DVTs, wound left thigh on wound vac, pneumonia DC on levaquin Q 48 hours last dose 2/11/20. He was DC on 2 L O2 however has been increased to 4L O2 via NC at STR yesterday when he arrived. Today he was at HD and reports dizziness with sitting up and acute onset of SOB. Son at bedside and states pt was doing ok since DC yesterday until today at HD. INR 2.4. CXR showed improving interstitial edema, unchanged left basilar opacity and left pleural effusion. Pt given lasix in ED. Pt is SOB when talking in short sentences. Denies CP, n/v.  Has had diarrhea however is not new since hospitalization.     
  
Past Medical History/Comorbidities:  
Mr. Anabel Moreland  has a past medical history of Arthritis, BPH (benign prostatic hyperplasia) (1/14/2013), CAD (coronary artery disease), DM type 2 (di[facility] mellitus, type 2) [facility] (dx [facility]), Dyspnea, Gout (1/14/2013), HLD (hyperlipidemia) (1/14/2013), HTN (hypertension), Morbid [facility] (9/3/14), Psychiatric disorder, Rheumatic fever, Seasonal allergic rhinitis, Severe aortic valve stenosis, Thyroid disease, and Unspecified sleep apnea (2016). Mr. Sofia Tenorio  has a past surgical history that includes hx tonsil and adenoidectomy; hx heart catheterization (12/23/2019); hx orthopaedic (Left); hx cataract removal (Bilateral, 2012); and ir insert tunl cvc w port over 5 years (2/4/2020). Social History/Living Environment:  
Home Environment: Private residence # Steps to Enter: 2 Rails to Enter: No 
One/Two Story Residence: One story Living Alone: No 
Support Systems: Spouse/Significant Other/Partner Patient Expects to be Discharged to[de-identified] Rehabilitation facility Current DME Used/Available at Home: Cane, straight, Walker, rolling Tub or Shower Type: Shower Prior Level of Function/Work/Activity: 
Lives with spouse, admit from rehab; has been using RW short distance ambulation, assist ADLs Personal Factors:   
      Sex:  male Age:  69 y.o. Overall Behavior:  agreeable Number of Personal Factors/Comorbidities that affect the Plan of Care: 
CAD, DM, DVTs, age 3+: HIGH COMPLEXITY EXAMINATION:  
Most Recent Physical Functioning:  
Gross Assessment: 
  
         
  
Posture: 
  
Balance: 
Sitting: Impaired Sitting - Static: Good (unsupported) Sitting - Dynamic: Fair (occasional) Standing: Impaired Bed Mobility: 
Rolling: Contact guard assistance Supine to Sit: Minimum assistance Sit to Supine: Stand-by assistance Scooting: Stand-by assistance Wheelchair Mobility: 
  
Transfers: 
Sit to Stand: Contact guard assistance Stand to Sit: Setup Gait: 
  
   
  
Body Structures Involved: 1. Heart 2. Lungs 3. Muscles Body Functions Affected: 1. Cardio 2. Respiratory 3. Movement Related Activities and Participation Affected: 1. General Tasks and Demands 2. Mobility 3. Self Care Number of elements that affect the Plan of Care: 4+: HIGH COMPLEXITY CLINICAL PRESENTATION:  
Presentation: Evolving clinical presentation with changing clinical characteristics: MODERATE COMPLEXITY CLINICAL DECISION MAKIN [facility] [address] [address] AM-PAC 6 Clicks Basic Mobility Inpatient Short Form How much difficulty does the patient currently have. .. Unable A Lot A Little None 1. Turning over in bed (including adjusting bedclothes, sheets and blankets)? [] 1   [] 2   [x] 3   [] 4  
2. Sitting down on and standing up from a chair with arms ( e.g., wheelchair, bedside commode, etc.)   [] 1   [] 2   [x] 3   [] 4  
3. Moving from lying on back to sitting on the side of the bed? [] 1   [x] 2   [] 3   [] 4 How much help from another person does the patient currently need. .. Total A Lot A Little None 4. Moving to and from a bed to a chair (including a wheelchair)? [] 1   [] 2   [x] 3   [] 4  
5. Need to walk in hospital room? [] 1   [] 2   [x] 3   [] 4  
6. Climbing 3-5 steps with a railing? [x] 1   [] 2   [] 3   [] 4  
© , Trustees of [address] [place] [address] [address] [id number], under license to "[employer] All rights reserved Score:  Initial: 17 Most Recent: 15 (Date: 3/13/20 ) Interpretation of Tool:  Represents activities that are increasingly more difficult (i.e. Bed mobility, Transfers, Gait). Medical Necessity:    
· Patient is expected to demonstrate progress in  
· strength, range of motion, balance, and coordination ·  to  
· decrease assistance required with overall functional mobility, transfers, ambulation · . · Patient demonstrates · good ·  rehab potential due to higher previous functional level. Reason for Services/Other Comments: 
· Patient · continues to require present interventions due to patient's inability to perform bed mobility, transfers, ambulation safely and effectively · . Use of outcome tool(s) and clinical judgement create a POC that gives a: Clear prediction of patient's progress: LOW COMPLEXITY  
  
 
 
 
TREATMENT:  
(In addition to Assessment/Re-Assessment sessions the following treatments were rendered) Pre-treatment Symptoms/Complaints:  I'm tired Pain: Initial:  
Pain Intensity 1: 0  Post Session: 0/10 Therapeutic Activity: (24 minutes ):  Therapeutic activities including Bed mobility, seated static/dynamic activities, sit-stand transfer training x many reps, standing static and dynamic mobility and functional activities, toilet transfers, gait training x many reps for short distance and Chair transfers to improve mobility, strength, balance, coordination and activity tolerance. Required minimal   to promote static and dynamic balance in standing and promote motor control of bilateral, lower extremity(s). Today's treatment session addressed Decreased Strength, Decreased ADL/Functional Activities, Decreased Ambulation Ability/Technique, Decreased Activity Tolerance and fluctuations in BP to progress towards achieving goal(s) 1, 2 and 3. During this session, Occupational Therapy addressed ADLs to progress towards their discipline specific goal(s). Co-treatment was necessary to improve patient's ability to increase activity demands and ability to return to normal functional activity. Date: 
3/13/20 Date: 
 Date: Activity/Exercise Parameters Parameters Parameters LAQ 15x AB Seated marching 15x AB Ankle pumps 15x AB Seated hip aBd     
     
     
     
A=active, B=bilateral 
 
Braces/Orthotics/Lines/Etc:  
· Nasal cannula Treatment/Session Assessment:   
· Response to Treatment:   Slow progress · Interdisciplinary Collaboration:  
o Physical Therapist 
o Registered Nurse · After treatment position/precautions:  
o Supine in bed 
o Bed/Chair-wheels locked 
o Bed in low position 
o Call light within reach 
o RN notified · Compliance with Program/Exercises: Compliant all of the time · Recommendations/Intent for next treatment session: \"Next visit will focus on advancements to more challenging activities\" as tolerated Total Treatment Duration: PT Patient Time In/Time Out Time In: 0791 Time Out: 4198 Chantal Scales DPT

## 2020-03-21 NOTE — PROGRESS NOTES
Pt states that his breathing rate has increased due to anxiety and is requesting medication. PRN xanax given per order.

## 2020-03-21 NOTE — PROGRESS NOTES
Hospitalist Note Admit Date:  2020  3:03 PM  
Name:  Angelo Woodruff Age:  69 y.o. 
:  1945 MRN:  [id number] PCP:  Jessenia Falk MD 
Treatment Team: Attending Provider: Celeste Vaughan MD; Consulting Provider: Daniel Arzate MD; Utilization Review: Sharri Dill RN; Hospitalist: Rose Joyce NP; Hospitalist: Anjum Randall NP; Consulting Provider: Deborah Yin MD; Consulting Provider: Hasmukh Parham MD; Care Manager: Juan Dugan.; Utilization Review: Lilian Wakefield; Consulting Provider: Heraclio Petit MD; Consulting Provider: Noemi Dunlap MD; Consulting Provider: Celeste Vaughan MD; Consulting Provider: Iris Mo MD; Physical Therapist: Bebo Raman DPT 
 
HPI/Subjective:  
Patient is feeling slightly better today he was having some suicidal ideation and mentioning that he better die Was seen by psychiatry and their notes say that patient has ideation but no intent and they adjust his medication He was not recommended to have involuntary inpatient stay Patient currently undergoing dialysis feels better with breathing denies any chest pain No other complaints Objective:  
 
Patient Vitals for the past 24 hrs: 
 Temp Pulse Resp BP SpO2  
20 1457 97.9 °F (36.6 °C) (!) 103 16 114/62 (!) 89 % 20 1439     91 %  
20 1341    103/67   
20 1148  89  104/74   
20 1105  87  109/73   
20 1034  85  115/78   
20 1000  81  111/70   
20 0938  83  109/75   
20 0906  82  118/69   
20 0831  82  116/75   
20 0759  79  117/78   
20 0746  79  109/63   
20 0719 97.9 °F (36.6 °C) 83 18 138/69 95 % 20 0318 98 °F (36.7 °C) 88 18 114/68 93 % 20 2328 97.6 °F (36.4 °C) 81 18 105/64 95 % 20 98 °F (36.7 °C) 76 19 138/59 95 % 20     95 % Oxygen Therapy O2 Sat (%): (!) 89 % (03/21/20 1457) Pulse via Oximetry: 81 beats per minute (03/20/20 1958) O2 Device: Nasal cannula (03/21/20 1439) PEEP/CPAP (cm H2O): 4 cm H20 (03/18/20 2012) O2 Flow Rate (L/min): 2 l/min (03/21/20 1439) O2 Temperature: 87.8 °F (31 °C) (03/10/20 0618) FIO2 (%): 35 % (03/19/20 2328) Estimated body mass index is 34.41 kg/m² as calculated from the following: 
  Height as of this encounter: 5' 10\" (1.778 m). Weight as of this encounter: 108.8 kg (239 lb 12.8 oz). Intake/Output Summary (Last 24 hours) at 3/21/2020 1515 Last data filed at 3/21/2020 1148 Gross per 24 hour Intake 730 ml Output 3225 ml Net -2495 ml *Note that automatically entered I/Os may not be accurate; dependent on patient compliance with collection and accurate  by [employer]. General:    Well nourished. Alert. Appears chronically weak but not in acute distress CV:   RRR. No murmur, rub, or gallop. Lungs:   CTAB. Few scattered crackles. Abdomen:   Soft, nontender, nondistended. Extremities: Warm and dry. No cyanosis or edema. Skin:     No rashes or jaundice. Neuro:  No gross focal deficits but has generalized weakness Data Review: 
I have reviewed all labs, meds, and studies from the last 24 hours: 
 
Recent Results (from the past 24 hour(s)) GLUCOSE, POC Collection Time: 03/20/20  4:36 PM  
Result Value Ref Range Glucose (POC) 246 (H) 65 - 100 mg/dL GLUCOSE, POC Collection Time: 03/20/20  8:21 PM  
Result Value Ref Range Glucose (POC) 419 (H) 65 - 100 mg/dL GLUCOSE, POC Collection Time: 03/21/20  5:37 AM  
Result Value Ref Range Glucose (POC) 100 65 - 100 mg/dL METABOLIC PANEL, BASIC Collection Time: 03/21/20  5:58 AM  
Result Value Ref Range Sodium 136 136 - 145 mmol/L Potassium 4.1 3.5 - 5.1 mmol/L Chloride 100 98 - 107 mmol/L  
 CO2 28 21 - 32 mmol/L Anion gap 8 7 - 16 mmol/L Glucose 90 65 - 100 mg/dL  BUN 33 (H) 8 - 23 MG/DL  
 Creatinine 5.11 (H) 0.8 - 1.5 MG/DL  
 GFR est AA 14 (L) >60 ml/min/1.73m2 GFR est non-AA 12 (L) >60 ml/min/1.73m2 Calcium 8.6 8.3 - 10.4 MG/DL PROTHROMBIN TIME + INR Collection Time: 03/21/20  5:58 AM  
Result Value Ref Range Prothrombin time 37.7 (H) 12.0 - 14.7 sec INR 3.7 (HH) GLUCOSE, POC Collection Time: 03/21/20 10:36 AM  
Result Value Ref Range Glucose (POC) 112 (H) 65 - 100 mg/dL GLUCOSE, POC Collection Time: 03/21/20 12:12 PM  
Result Value Ref Range Glucose (POC) 135 (H) 65 - 100 mg/dL All Micro Results Procedure Component Value Units Date/Time CULTURE, RESPIRATORY/SPUTUM/BRONCH Gautam Sartorius STAIN [[id number]]  (Abnormal) Collected:  03/09/20 5406 Order Status:  Completed Specimen:  Sputum Updated:  03/12/20 2341 Special Requests: NO SPECIAL REQUESTS     
  GRAM STAIN 10 TO 50 WBC'S/OIF  
   0 TO 2 EPITHELIAL CELLS/OIF  
   FEW GRAM POSITIVE RODS MODERATE YEAST 4+ MUCUS PRESENT Culture result: MODERATE CANDIDA ALBICANS     
 CULTURE, BLOOD [[id number]] Collected:  03/06/20 0944 Order Status:  Completed Specimen:  Blood Updated:  03/11/20 0628 Special Requests: --     
  RIGHT Antecubital 
  
  Culture result: NO GROWTH 5 DAYS     
 CULTURE, BLOOD [[id number]] Collected:  03/06/20 3880 Order Status:  Completed Specimen:  Blood Updated:  03/11/20 7012 Special Requests: --     
  LEFT Antecubital 
  
  Culture result: NO GROWTH 5 DAYS     
 CULTURE, RESPIRATORY/SPUTUM/BRONCH Gautam Sartorius STAIN [[id number]] Collected:  03/06/20 1600 Order Status:  Canceled Specimen:  Sputum C. DIFFICILE AG & TOXIN A/B [[id number]] Order Status:  Canceled Specimen:  Stool CULTURE, RESPIRATORY/SPUTUM/BRONCH Juno Cruise [[id number]] Collected:  02/17/20 1220 Order Status:  Completed Specimen:  Sputum from Bronchial Washing Updated:  02/24/20 1244   Special Requests: NO SPECIAL REQUESTS     
  GRAM STAIN 20 TO 35 WBCS SEEN PER OIF  
 1 TO 2 EPITHELIAL CELLS SEEN PER OIF  
      
  MODERATE GRAM POSITIVE COCCI  
     
   FEW GRAM POSITIVE RODS Culture result:    
  LIGHT NORMAL RESPIRATORY BRENNAN Current Meds: 
Current Facility-Administered Medications Medication Dose Route Frequency  Warfarin on Hold   Other Rx Dosing/Monitoring  [START ON 3/22/2020] furosemide (LASIX) tablet 40 mg  40 mg Oral DAILY  alum-mag hydroxide-simeth (MYLANTA) oral suspension 30 mL  30 mL Oral Q4H PRN  
 sertraline (ZOLOFT) tablet 100 mg  100 mg Oral DAILY  buPROPion [facility]) tablet 75 mg  75 mg Oral BID  loratadine (CLARITIN) tablet 10 mg  10 mg Oral DAILY  diphenhydrAMINE (BENADRYL) capsule 25 mg  25 mg Oral Q6H PRN  mirtazapine (REMERON) tablet 15 mg  15 mg Oral QHS  ALPRAZolam (XANAX) tablet 0.5 mg  0.5 mg Oral Q6H PRN  
 bacitracin 500 unit/gram ointment   Topical BID  insulin lispro (HUMALOG) injection   SubCUTAneous AC&HS  
 dextrose 40% (GLUTOSE) oral gel 1 Tube  15 g Oral PRN  
 glucagon (GLUCAGEN) injection 1 mg  1 mg IntraMUSCular PRN  
 dextrose (D50W) injection syrg 12.5-25 g  25-50 mL IntraVENous PRN  
 HYDROcodone-homatropine (HYCODAN) 5-1.5 mg/5 mL (5 mL) syrup 5 mL  5 mL Oral Q4H PRN  
 benzonatate (TESSALON) capsule 100 mg  100 mg Oral TID PRN  
 guaiFENesin ER (MUCINEX) tablet 1,200 mg  1,200 mg Oral Q12H  
 albuterol (PROVENTIL VENTOLIN) nebulizer solution 2.5 mg  2.5 mg Nebulization Q6H RT  
 insulin glargine (LANTUS) injection 5 Units  5 Units SubCUTAneous QHS  sodium chloride (OCEAN) 0.65 % nasal squeeze bottle 2 Spray  2 Spray Both Nostrils Q2H PRN  
 morphine injection 2 mg  2 mg IntraVENous Q6H PRN  
 sodium chloride 3% hypertonic nebulizer soln  4 mL Nebulization BID RT  
 sodium chloride (OCEAN) 0.65 % nasal squeeze bottle 2 Spray  2 Spray Both Nostrils BID  epoetin maritza-epbx (RETACRIT) 14,000 Units combo injection  14,000 Units SubCUTAneous Q7D  
  polyethylene glycol (MIRALAX) packet 17 g  17 g Oral DAILY  midodrine (PROAMITINE) tablet 10 mg  10 mg Oral TID WITH MEALS  tamsulosin (FLOMAX) capsule 0.4 mg  0.4 mg Oral QHS  loperamide (IMODIUM) capsule 4 mg  4 mg Oral QID PRN  pantothenic ac-min oil-pet,hyd (AQUAPHOR) 41 % ointment   Topical DAILY  traMADol (ULTRAM) tablet 50 mg  50 mg Oral Q6H PRN  
 sodium chloride (NS) flush 5-40 mL  5-40 mL IntraVENous PRN  
 acetaminophen (TYLENOL) tablet 650 mg  650 mg Oral Q4H PRN  
 naloxone (NARCAN) injection 0.4 mg  0.4 mg IntraVENous PRN  
 ondansetron (ZOFRAN) injection 4 mg  4 mg IntraVENous Q4H PRN  
 bisacodyL (DULCOLAX) tablet 5 mg  5 mg Oral DAILY PRN Other Studies: 
Results for orders placed or performed during the hospital encounter of 20  
2D ECHO COMPLETE ADULT (TTE) W OR WO CONTR [facility]arrative [facility] [address] [place], [address] [facility] 
[phone] Transthoracic Echocardiogram 
2D, M-mode, Doppler, and Color Doppler Patient: Ameena Foley 
MR #: [id number] : 1945 Age: 69 years Gender: Male Study date: 18-Mar-2020 Account #: [de-identified] Height: 69.7 in 
Weight: 219.6 lb 
BSA: 2.17 mï¾² Status:Routine Location: 824 BP: 85/ 55 Allergies: HEPARIN, AMOXICILLIN, CEPHALEXIN, PENICILLINS Sonographer:  Saige Priest RDCS Group:  [facility] Referring Physician:  Abdoulaye Rhodes MD 
Reading Physician:  Sofi Jimenes. MD Alvino [facility] INDICATIONS: Orthostatic Hypotension following CABG. *Patient scanned sitting up. Some limited windows. * PROCEDURE: This was a routine study. A transthoracic echocardiogram was 
performed. The study included complete 2D imaging, M-mode, complete spectral 
Doppler, and color Doppler. Intravenous contrast (Definity) was administered. Echocardiographic views were limited by restricted patient mobility and poor acoustic window availability. Image quality was adequate. LEFT VENTRICLE: Size was normal. Systolic function was normal. Ejection 
fraction was estimated in the range of 55 % to 60 %. The septal walls appear 
hypokinetic. Wall thickness was normal. There was mild concentric  
hypertrophy. The E/e' ratio was 12.6. Left ventricular diastolic function parameters were 
normal. 
 
VENTRICULAR SEPTUM: There was paradoxical motion. RIGHT VENTRICLE: The size and function appear normal from parasternal window. Difficult to assess apically. Systolic function was normal. The tricuspid jet 
envelope definition was inadequate for estimation of RV systolic pressure. LEFT ATRIUM: The atrium was mildly dilated. RIGHT ATRIUM: Not well visualized. SYSTEMIC VEINS: IVC: The inferior vena cava was normal in size. The 
respirophasic change in diameter was less than 50%. AORTIC VALVE: The valve was not well visualized. The aortic valve area by the 
continuity equation was 1.47 cm2. The peak velocity was 2.41 m/s. The mean 
pressure gradient was 12 mmHg. The peak pressure gradient was 23 mmHg. The 
DI=0.47. The AT=70ms. There was mild stenosis. There was no insufficiency. MITRAL VALVE: Valve structure was normal. There was no evidence for stenosis. There was trivial regurgitation. TRICUSPID VALVE: Not well visualized. There was no evidence for stenosis. There 
was no regurgitation. PULMONIC VALVE: Not well visualized. There was no evidence for stenosis. There 
was trivial regurgitation. PERICARDIUM: There was no pericardial effusion. AORTA: The root exhibited normal size. SUMMARY: 
 
-  Left ventricle: Systolic function was normal. Ejection fraction was 
estimated in the range of 55 % to 60 %. The septal walls appear hypokinetic. There was mild concentric hypertrophy. The E/e' ratio was 12.6. 
 
-  Ventricular septum: There was paradoxical motion. -  Left atrium: The atrium was mildly dilated. -  Inferior vena cava, hepatic veins: The respirophasic change in diameter  
was 
less than 50%. -  Aortic valve: There was mild stenosis. The aortic valve area by the 
continuity equation was 1.47 cm2. SYSTEM MEASUREMENT TABLES 
 
2D mode AoR Diam (2D): 2.9 cm 
LA Dimension (2D): 4 cm Left Atrium Systolic Volume Index; Method of Disks, Biplane; 2D mode;: 25  
ml/m2 IVS/LVPW (2D): 1 IVSd (2D): 1.3 cm LVIDd (2D): 4.7 cm 
LVIDs (2D): 3.1 cm 
LVOT Area (2D): 3.1 cm2 LVPWd (2D): 1.3 cm RVIDd (2D): 3.1 cm Unspecified Scan Mode Peak Grad; Mean; Antegrade Flow: 20 mm[Hg] Vmax; Antegrade Flow: 203 cm/s LVOT Diam: 2 cm Prepared and signed by 
 
Concepcion De Oliveira MD [facility] Signed 18-Mar-2020 12:27:44 Xr Chest Sngl V Result Date: 3/21/2020 EXAM: XR CHEST SNGL V INDICATION: monitor L atelectasis vs effusion COMPARISON: 3/20/2020 FINDINGS: A portable AP radiograph of the chest was obtained at 0424 hours. The patient is on a cardiac monitor. Left lower lobe airspace disease and left pleural effusion unchanged. Dialysis catheter is in place. The cardiac and mediastinal contours and pulmonary vascularity are normal.  The bones and soft tissues are grossly within normal limits. IMPRESSION: Left lower lobe atelectasis or pneumonia left pleural effusion unchanged. Assessment and Plan:  
 
Hospital Problems as of 3/21/2020 Date Reviewed: 3/9/2020 Codes Class Noted - Resolved POA Acute on chronic respiratory failure with hypoxia [facility]) ICD-10-CM: H06.04 
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 
ICD-9-CM: 250.40, 583.81  8/26/2019 - Present Yes Obesity, morbid ([facility] [address] (Chronic) ICD-10-CM: E66.01 
ICD-9-CM: 278.01  10/19/2018 - Present Yes DM type 2 (diabetes mellitus, type 2) (HCC) (Chronic) ICD-10-CM: E11.9 ICD-9-CM: 250.00  1/14/2013 - Present Yes HLD (hyperlipidemia) (Chronic) ICD-10-CM: M15.2 ICD-9-CM: 272.4  1/14/2013 - Present Yes RESOLVED: Acute-on-chronic kidney injury ([facility] [address] ICD-10-CM: N17.9, N18.9 ICD-9-CM: 584.9, 585.9  2/17/2020 - 2/25/2020 Yes RESOLVED: Atelectasis ICD-10-CM: J98.11 ICD-9-CM: 518.0  2/17/2020 - 2/25/2020 Yes RESOLVED: Fluid overload ICD-10-CM: E87.70 ICD-9-CM: 276.69  2/15/2020 - 2/25/2020 Yes RESOLVED: Pleural effusion ICD-10-CM: J90 ICD-9-CM: 511.9  2/3/2020 - 2/25/2020 Yes * (Principal) RESOLVED: Acute hypoxemic respiratory failure ([facility] [address] ICD-10-CM: J96.01 
ICD-9-CM: 518.81  1/10/2020 - 2/25/2020 Yes RESOLVED: HTN (hypertension) ICD-10-CM: I10 
ICD-9-CM: 401.9  1/14/2013 - 2/25/2020 Yes Plan: 
Plan: 
Acute hypoxemic respiratory failure with Progressive LLL consolidation 
-Seen by pulmonary and trying to improve the atelectasis off antibiotics - S/p bronch 2/17. Pulm following. Discussed Danny Douglass, he is clear from this point review for discharge to rehab 
  
LUE pain 
- US : no DVT but Thrombosed superficial branch of the left cephalic vein. Pain has improved 
  
DEJUAN now on HD 
- HD per nephro has chronic kidney disease as well On dialysis which may continue 
  
DM2 
- sliding scale 
- holding Basal/prandial insulins with sugars reasonable overall Depression and anxiety symptoms Medication adjusted by psychiatry 
 
 
  
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 
-INR now  Supra therapeutic at 3.7 will hold Coumadin today and reevaluate tomorrow 
  
Hopefully can be discharged to rehab soon 
  
 
DC planning/Dispo:   
 
 
Diet:  DIET DIABETIC CONSISTENT CARB 
DIET NUTRITIONAL SUPPLEMENTS 
DVT ppx:   
 
Signed: 
Carlyle Bolton MD

## 2020-03-22 NOTE — PROGRESS NOTES
Received bedside shift report from off going nurse, Rani Velazquez. Patient resting in bed quietly. Respirations even and unlabored on 3 L via nasal cannula. Bed low and locked. Bedside table, personal belongings and call light all within reach.

## 2020-03-22 NOTE — PROGRESS NOTES
03/22/20 1451 Dual Skin Pressure Injury Assessment Dual Skin Pressure Injury Assessment X Second Care Provider (Based on [address] [place] [address]) Mary Mitchell RN Sacrum  Mid  
Skin Integumentary Skin Integumentary (WDL) X Skin Color Ecchymosis (comment) Skin Condition/Temp Warm Skin Integrity Wound (add Wound LDA) Turgor Non-tenting Hair Growth Sparce Varicosities Absent Wound Prevention and Protection Methods Orientation of Wound Prevention Posterior Location of Wound Prevention Sacrum/Coccyx Dressing Present  Yes Dressing Status Intact Wound Offloading (Prevention Methods) Bed, pressure reduction mattress;Repositioning Dual skin assessment with Mary Mitchell RN, sacral wound noted, toes black bilateral feet, excoriation abd, BUE, BLE, ABD.

## 2020-03-22 NOTE — PROGRESS NOTES
TRANSFER - IN REPORT: 
 
Verbal report received from [facility], [address] on Jovani Hess  being received from 8th floor for routine progression of care Report consisted of patients Situation, Background, Assessment and  
Recommendations(SBAR). Information from the following report(s) SBAR was reviewed with the receiving nurse. Opportunity for questions and clarification was provided. Assessment completed upon patients arrival to unit and care assumed.

## 2020-03-22 NOTE — PROGRESS NOTES
Warfarin dosing per pharmacist 
 
Jenise Villalobos is a 69 y.o. male. Height: 5' 10\" (177.8 cm)   Weight 103.5 kg (228 lbs) Indication:  AVR and afib Goal INR:  2.5 - 3.5 Home dose:  2.5 mg daily Risk factors or significant drug interactions: HIT+(SHARON positive on 1/18/20), amiodarone (dc'd 2/5), Levofloxacin (2/13- 2/20 ), mirtazapine (2/13-2/24), escitalopram (2/19-2/20), trazodone (started 2/24) Other anticoagulants: None Daily Monitoring Date  INR     Warfarin dose HGB              Notes 2/14  2.4  3 mg  8.7 2/15  2.2  4 mg  9.6 2/16  2.1  5 mg  9.8 2/17  2.1  5 mg  9.5 2/18  2.8  2 mg  9.3 2/19  2.5  2 mg  8.7 
2/20  1.9  3 mg   8.8 
2/21  1.9  4 mg  8.6 
2/22  2.1  3 mg  8.5 
2/23  1.9  4 mg  --- 
2/24  1.7  3 mg + 2 mg --- 
2/25  1.7  5 mg  --- 
2/26  1.6  6 mg  --- 
2/27  1.7  6 mg   --- 
2/28  1.7  7.5 mg  --- 
2/29  2.1 ` 6 mg  7.7 
3/1  2.0  7.5 mg  --- 
3/2  2.9  6 mg  --- 
3/3  3.4  5 mg  8.0 
3/4  2.7  7.5mg  8.6 
3/5  2.9  6 mg  8.0 
3/6  3.1  Held  8.4 
3/7  2.6  6 mg  8.0 
3/8  2.7  6mg  --- 
3/9  2.5  6 mg  --- 
3/10  2.7  6 mg  7.6 
3/11  3.3  5 mg  8.3 
3/12  4.3  Hold  8.2 
3/13  3.9  Hold  8.8 
3/14  3.5  Hold  --- 
3/15  2.8  Hold  --- 
3/16  2.5  Hold  --- 
3/17  3.1  6 mg  --- On argatroban 3/18  3.0  6 mg  10.3 On argatroban 3/19  4.1  5 mg  9.8 On argatroban 3/19  2.9  5 mg  9.8 Off argatroban for 2 hours 3/20  2.5  6 mg  10.2 
3/21  3.7  Hold  --- 
3/22  4.2  Hold  10.9 Pulmonary performed thoracentesis on 3/17. Patient was bridged with argatroban. INR supratherapeutic at 4.2. Will continue to hold warfarin this evening and plan to resume when INR within goal. 
 
Pharmacy will continue to follow patient and monitor INR daily. Thank you, Doretha Cardozo, Pharm. D. 
PGY1 Pharmacy Resident [phone]

## 2020-03-22 NOTE — PROGRESS NOTES
Hospitalist Note Admit Date:  2020  3:03 PM  
Name:  More Garcia Age:  69 y.o. 
:  1945 MRN:  [id number] PCP:  Coni Forrestre MD 
Treatment Team: Attending Provider: Maeve Lyn MD; Consulting Provider: Janelle Ramirez MD; Utilization Review: Sunni Borjas RN; Hospitalist: Cally Muse NP; Hospitalist: Robel Dang NP; Consulting Provider: Deborah Yin MD; Consulting Provider: Paolo Arnett MD; Care Manager: Ann Agudelo.; Utilization Review: Lisa Officer; Consulting Provider: Ranjit Vogel MD; Consulting Provider: Abdifatah Mroris MD; Consulting Provider: Maeve Lyn MD; Consulting Provider: Safia Weiner MD 
 
HPI/Subjective:  
Patient generally feeling somewhat better remain generally weak but cough is improved denies any shortness of breath at rest 
Appetite is fair Has had dialysis yesterday No other complaints Objective:  
 
Patient Vitals for the past 24 hrs: 
 Temp Pulse Resp BP SpO2  
20 1451 98 °F (36.7 °C) 88 18 129/61 96 % 20 1331     96 % 20 1208 98.4 °F (36.9 °C) 86 19 122/63 100 % 20 0849     96 % 20 0810 97.3 °F (36.3 °C) 89 19 148/82 95 % 20 0336 98 °F (36.7 °C) 84 19 129/67 95 % 20 2327 98.4 °F (36.9 °C) 91 19 143/80 95 % 20 1936 98.1 °F (36.7 °C) 93 18 143/76 96 % 20 1924     95 % 20 1844  [address] Oxygen Therapy O2 Sat (%): 96 %(91% charted, recheck 96%) (20 1451) Pulse via Oximetry: 84 beats per minute (20 192) O2 Device: Hi flow nasal cannula (20 1331) PEEP/CPAP (cm H2O): 4 cm H20 (20) O2 Flow Rate (L/min): 3.5 l/min (20 133) O2 Temperature: 87.8 °F (31 °C) (03/10/20 0618) FIO2 (%): 35 % (20) Estimated body mass index is 34.72 kg/m² as calculated from the following: 
  Height as of this encounter: 5' 10\" (1.778 m). Weight as of this encounter: 109.8 kg (242 lb). Intake/Output Summary (Last 24 hours) at 3/22/2020 1517 Last data filed at 3/22/2020 4559 Gross per 24 hour Intake 118 ml Output 200 ml Net -82 ml *Note that automatically entered I/Os may not be accurate; dependent on patient compliance with collection and accurate  by techs. General:    Well nourished. Alert.  ,  Not seemingly in acute shortness of breath HEENT              JAIME, no cervical lymphadenopathy or thyroid enlargement CV:   RRR. No murmur, rub, or gallop. Lungs:   CTAB. Some wheezing but improving Abdomen:   Soft, nontender, nondistended. Extremities: Warm and dry. Mild leg edema Skin:     No rashes or jaundice. Neuro:  No gross focal deficits generalized weakness Data Review: 
I have reviewed all labs, meds, and studies from the last 24 hours: 
 
Recent Results (from the past 24 hour(s)) PROTHROMBIN TIME + INR Collection Time: 03/21/20  4:08 PM  
Result Value Ref Range Prothrombin time 43.0 (H) 12.0 - 14.7 sec INR 4.4 (HH) GLUCOSE, POC Collection Time: 03/21/20  5:19 PM  
Result Value Ref Range Glucose (POC) 298 (H) 65 - 100 mg/dL GLUCOSE, POC Collection Time: 03/21/20  8:39 PM  
Result Value Ref Range Glucose (POC) 262 (H) 65 - 100 mg/dL GLUCOSE, POC Collection Time: 03/22/20  5:36 AM  
Result Value Ref Range Glucose (POC) 157 (H) 65 - 100 mg/dL METABOLIC PANEL, BASIC Collection Time: 03/22/20  6:22 AM  
Result Value Ref Range Sodium 135 (L) 136 - 145 mmol/L Potassium 4.2 3.5 - 5.1 mmol/L Chloride 100 98 - 107 mmol/L  
 CO2 27 21 - 32 mmol/L Anion gap 8 7 - 16 mmol/L Glucose 171 (H) 65 - 100 mg/dL BUN 22 8 - 23 MG/DL Creatinine 4.05 (H) 0.8 - 1.5 MG/DL  
 GFR est AA 19 (L) >60 ml/min/1.73m2 GFR est non-AA 15 (L) >60 ml/min/1.73m2 Calcium 8.8 8.3 - 10.4 MG/DL PROTHROMBIN TIME + INR  Collection Time: 03/22/20  6:22 AM  
 Result Value Ref Range Prothrombin time 41.6 (H) 12.0 - 14.7 sec INR 4.2 (HH)    
CBC W/O DIFF Collection Time: 03/22/20  6:22 AM  
Result Value Ref Range WBC 9.9 4.3 - 11.1 K/uL  
 RBC 3.76 (L) 4.23 - 5.6 M/uL  
 HGB 10.9 (L) 13.6 - 17.2 g/dL HCT 35.0 (L) 41.1 - 50.3 % MCV 93.1 79.6 - 97.8 FL  
 MCH 29.0 26.1 - 32.9 PG  
 MCHC 31.1 (L) 31.4 - 35.0 g/dL  
 RDW 16.2 (H) 11.9 - 14.6 % PLATELET 283 949 - 498 K/uL MPV 10.3 9.4 - 12.3 FL ABSOLUTE NRBC 0.00 0.0 - 0.2 K/uL GLUCOSE, POC Collection Time: 03/22/20 11:38 AM  
Result Value Ref Range Glucose (POC) 232 (H) 65 - 100 mg/dL All Micro Results Procedure Component Value Units Date/Time CULTURE, RESPIRATORY/SPUTUM/BRONCH Elease Hussar STAIN [[id number]]  (Abnormal) Collected:  03/09/20 9629 Order Status:  Completed Specimen:  Sputum Updated:  03/12/20 4264 Special Requests: NO SPECIAL REQUESTS     
  GRAM STAIN 10 TO 50 WBC'S/OIF  
   0 TO 2 EPITHELIAL CELLS/OIF  
   FEW GRAM POSITIVE RODS MODERATE YEAST 4+ MUCUS PRESENT Culture result: MODERATE CANDIDA ALBICANS     
 CULTURE, BLOOD [[id number]] Collected:  03/06/20 0944 Order Status:  Completed Specimen:  Blood Updated:  03/11/20 9680 Special Requests: --     
  RIGHT Antecubital 
  
  Culture result: NO GROWTH 5 DAYS     
 CULTURE, BLOOD [[id number]] Collected:  03/06/20 8061 Order Status:  Completed Specimen:  Blood Updated:  03/11/20 1318 Special Requests: --     
  LEFT Antecubital 
  
  Culture result: NO GROWTH 5 DAYS     
 CULTURE, RESPIRATORY/SPUTUM/BRONCH Elease Hussar STAIN [[id number]] Collected:  03/06/20 1600 Order Status:  Canceled Specimen:  Sputum C. DIFFICILE AG & TOXIN A/B [[id number]] Order Status:  Canceled Specimen:  Stool CULTURE, RESPIRATORY/SPUTUM/BRONCH Mary Busman [[id number]] Collected:  02/17/20 1220 Order Status:  Completed Specimen:  Sputum from Bronchial Washing Updated:  02/24/20 1246 Special Requests: NO SPECIAL REQUESTS     
  GRAM STAIN 20 TO 35 WBCS SEEN PER OIF  
   1 TO 2 EPITHELIAL CELLS SEEN PER OIF  
      
  MODERATE GRAM POSITIVE COCCI  
     
   FEW GRAM POSITIVE RODS Culture result:    
  LIGHT NORMAL RESPIRATORY BRENNAN Current Meds: 
Current Facility-Administered Medications Medication Dose Route Frequency  Warfarin on Hold   Other Rx Dosing/Monitoring  furosemide (LASIX) tablet 40 mg  40 mg Oral DAILY  alum-mag hydroxide-simeth (MYLANTA) oral suspension 30 mL  30 mL Oral Q4H PRN  
 sertraline (ZOLOFT) tablet 100 mg  100 mg Oral DAILY  buPROPion [facility]) tablet 75 mg  75 mg Oral BID  loratadine (CLARITIN) tablet 10 mg  10 mg Oral DAILY  diphenhydrAMINE (BENADRYL) capsule 25 mg  25 mg Oral Q6H PRN  mirtazapine (REMERON) tablet 15 mg  15 mg Oral QHS  ALPRAZolam (XANAX) tablet 0.5 mg  0.5 mg Oral Q6H PRN  
 bacitracin 500 unit/gram ointment   Topical BID  insulin lispro (HUMALOG) injection   SubCUTAneous AC&HS  
 dextrose 40% (GLUTOSE) oral gel 1 Tube  15 g Oral PRN  
 glucagon (GLUCAGEN) injection 1 mg  1 mg IntraMUSCular PRN  
 dextrose (D50W) injection syrg 12.5-25 g  25-50 mL IntraVENous PRN  
 HYDROcodone-homatropine (HYCODAN) 5-1.5 mg/5 mL (5 mL) syrup 5 mL  5 mL Oral Q4H PRN  
 benzonatate (TESSALON) capsule 100 mg  100 mg Oral TID PRN  
 guaiFENesin ER (MUCINEX) tablet 1,200 mg  1,200 mg Oral Q12H  
 albuterol (PROVENTIL VENTOLIN) nebulizer solution 2.5 mg  2.5 mg Nebulization Q6H RT  
 insulin glargine (LANTUS) injection 5 Units  5 Units SubCUTAneous QHS  sodium chloride (OCEAN) 0.65 % nasal squeeze bottle 2 Spray  2 Spray Both Nostrils Q2H PRN  
 morphine injection 2 mg  2 mg IntraVENous Q6H PRN  
 sodium chloride 3% hypertonic nebulizer soln  4 mL Nebulization BID RT  
 sodium chloride (OCEAN) 0.65 % nasal squeeze bottle 2 Spray  2 Spray Both Nostrils BID  
  epoetin maritza-epbx (RETACRIT) 14,000 Units combo injection  14,000 Units SubCUTAneous Q7D  
 polyethylene glycol (MIRALAX) packet 17 g  17 g Oral DAILY  midodrine (PROAMITINE) tablet 10 mg  10 mg Oral TID WITH MEALS  tamsulosin (FLOMAX) capsule 0.4 mg  0.4 mg Oral QHS  loperamide (IMODIUM) capsule 4 mg  4 mg Oral QID PRN  pantothenic ac-min oil-pet,hyd (AQUAPHOR) 41 % ointment   Topical DAILY  traMADol (ULTRAM) tablet 50 mg  50 mg Oral Q6H PRN  
 sodium chloride (NS) flush 5-40 mL  5-40 mL IntraVENous PRN  
 acetaminophen (TYLENOL) tablet 650 mg  650 mg Oral Q4H PRN  
 naloxone (NARCAN) injection 0.4 mg  0.4 mg IntraVENous PRN  
 ondansetron (ZOFRAN) injection 4 mg  4 mg IntraVENous Q4H PRN  
 bisacodyL (DULCOLAX) tablet 5 mg  5 mg Oral DAILY PRN Other Studies: 
Results for orders placed or performed during the hospital encounter of 20  
2D ECHO COMPLETE ADULT (TTE) W OR WO CONTR Narrative [facility] [address] [place], [address] [facility] 
[phone] Transthoracic Echocardiogram 
2D, M-mode, Doppler, and Color Doppler Patient: Delphia Curling 
MR #: [id number] : 1945 Age: 69 years Gender: Male Study date: 18-Mar-2020 Account #: [de-identified] Height: 69.7 in 
Weight: 219.6 lb 
BSA: 2.17 mï¾² Status:Routine Location: 824 BP: 85/ 55 Allergies: HEPARIN, AMOXICILLIN, CEPHALEXIN, PENICILLINS Sonographer:  Michell Rose RD Group:  [address] Cardiology Referring Physician:  Iliana Barnett MD 
Reading Physician:  Jaja De Oliveira MD [facility] INDICATIONS: Orthostatic Hypotension following CABG. *Patient scanned sitting up. Some limited windows. * PROCEDURE: This was a routine study. A transthoracic echocardiogram was 
performed. The study included complete 2D imaging, M-mode, complete spectral 
Doppler, and color Doppler. Intravenous contrast (Definity) was administered. Echocardiographic views were limited by restricted patient mobility and poor 
acoustic window availability. Image quality was adequate. LEFT VENTRICLE: Size was normal. Systolic function was normal. Ejection 
fraction was estimated in the range of 55 % to 60 %. The septal walls appear 
hypokinetic. Wall thickness was normal. There was mild concentric  
hypertrophy. The E/e' ratio was 12.6. Left ventricular diastolic function parameters were 
normal. 
 
VENTRICULAR SEPTUM: There was paradoxical motion. RIGHT VENTRICLE: The size and function appear normal from parasternal window. Difficult to assess apically. Systolic function was normal. The tricuspid jet 
envelope definition was inadequate for estimation of RV systolic pressure. LEFT ATRIUM: The atrium was mildly dilated. RIGHT ATRIUM: Not well visualized. SYSTEMIC VEINS: IVC: The inferior vena cava was normal in size. The 
respirophasic change in diameter was less than 50%. AORTIC VALVE: The valve was not well visualized. The aortic valve area by the 
continuity equation was 1.47 cm2. The peak velocity was 2.41 m/s. The mean 
pressure gradient was 12 mmHg. The peak pressure gradient was 23 mmHg. The 
DI=0.47. The AT=70ms. There was mild stenosis. There was no insufficiency. MITRAL VALVE: Valve structure was normal. There was no evidence for stenosis. There was trivial regurgitation. TRICUSPID VALVE: Not well visualized. There was no evidence for stenosis. There 
was no regurgitation. PULMONIC VALVE: Not well visualized. There was no evidence for stenosis. There 
was trivial regurgitation. PERICARDIUM: There was no pericardial effusion. AORTA: The root exhibited normal size. SUMMARY: 
 
-  Left ventricle: Systolic function was normal. Ejection fraction was 
estimated in the range of 55 % to 60 %. The septal walls appear hypokinetic. There was mild concentric hypertrophy. The E/e' ratio was 12.6. -  Ventricular septum: There was paradoxical motion. -  Left atrium: The atrium was mildly dilated. -  Inferior vena cava, hepatic veins: The respirophasic change in diameter  
was 
less than 50%. -  Aortic valve: There was mild stenosis. The aortic valve area by the 
continuity equation was 1.47 cm2. SYSTEM MEASUREMENT TABLES 
 
2D mode AoR Diam (2D): 2.9 cm 
LA Dimension (2D): 4 cm Left Atrium Systolic Volume Index; Method of Disks, Biplane; 2D mode;: 25  
ml/m2 IVS/LVPW (2D): 1 IVSd (2D): 1.3 cm LVIDd (2D): 4.7 cm 
LVIDs (2D): 3.1 cm 
LVOT Area (2D): 3.1 cm2 LVPWd (2D): 1.3 cm RVIDd (2D): 3.1 cm Unspecified Scan Mode Peak Grad; Mean; Antegrade Flow: 20 mm[Hg] Vmax; Antegrade Flow: 203 cm/s LVOT Diam: 2 cm Prepared and signed by 
 
Gary De Oliveira MD [facility] Signed 18-Mar-2020 12:27:44 Xr Chest Sngl V Result Date: 3/22/2020 EXAM: XR CHEST SNGL V INDICATION: Respiratory failure COMPARISON: 3/21/2020 FINDINGS: A portable AP radiograph of the chest was obtained at 0504 hours. The patient is on a cardiac monitor. Left lower lobe airspace disease and left pleural effusion unchanged. The cardiac and mediastinal contours and pulmonary vascularity are normal.  The bones and soft tissues are grossly within normal limits. IMPRESSION: Left lower lobe atelectasis or pneumonia and left pleural effusion unchanged. Assessment and Plan:  
 
Hospital Problems as of 3/22/2020 Date Reviewed: 3/9/2020 Codes Class Noted - Resolved POA Acute on chronic respiratory failure with hypoxia [facility]) ICD-10-CM: X11.59 
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 
ICD-9-CM: 250.40, 583.81  8/26/2019 - Present Yes Obesity, morbid ([facility] [address] (Chronic) ICD-10-CM: E66.01 
ICD-9-CM: 278.01  10/19/2018 - Present Yes DM type 2 (diabetes mellitus, type 2) (HCC) (Chronic) ICD-10-CM: E11.9 ICD-9-CM: 250.00  1/14/2013 - Present Yes HLD (hyperlipidemia) (Chronic) ICD-10-CM: C88.2 ICD-9-CM: 272.4  1/14/2013 - Present Yes RESOLVED: Acute-on-chronic kidney injury ([facility] [address] ICD-10-CM: N17.9, N18.9 ICD-9-CM: 584.9, 585.9  2/17/2020 - 2/25/2020 Yes RESOLVED: Atelectasis ICD-10-CM: J98.11 ICD-9-CM: 518.0  2/17/2020 - 2/25/2020 Yes RESOLVED: Fluid overload ICD-10-CM: E87.70 ICD-9-CM: 276.69  2/15/2020 - 2/25/2020 Yes RESOLVED: Pleural effusion ICD-10-CM: J90 ICD-9-CM: 511.9  2/3/2020 - 2/25/2020 Yes * (Principal) RESOLVED: Acute hypoxemic respiratory failure ([facility] [address] ICD-10-CM: J96.01 
ICD-9-CM: 518.81  1/10/2020 - 2/25/2020 Yes RESOLVED: HTN (hypertension) ICD-10-CM: I10 
ICD-9-CM: 401.9  1/14/2013 - 2/25/2020 Yes Plan: 
Plan: 
Acute hypoxemic respiratory failure with Progressive LLL consolidation 
-Seen by pulmonary and trying to improve the atelectasis off antibiotics - S/p bronch 2/17. Pulm following. Discussed with DR Edil Su is clear from this point review for discharge to rehab Nephrology also has cleared him for discharge 
  
LUE pain 
- US : no DVT but Thrombosed superficial branch of the left cephalic vein. Pain has improved 
  
DEJUAN now on HD 
- HD per nephro has chronic kidney disease as well On dialysis which may continue 
  
DM2 
- sliding scale 
- holding Basal/prandial insulins with sugars reasonable overall 
  
Depression and anxiety symptoms Medication adjusted by psychiatry 
 
  
  
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 
-INR now  Supra therapeutic  over 4.0  will hold Coumadin today and reevaluate tomorrow 
  
Hopefully can be discharged to rehab soon , discharge orders are complete DC planning/Dispo:   
 
 
Diet:  DIET DIABETIC CONSISTENT CARB 
DIET NUTRITIONAL SUPPLEMENTS 
DVT ppx:   
 
Signed: 
Aubrey Grey MD

## 2020-03-22 NOTE — DISCHARGE SUMMARY
Hospitalist Discharge Summary Admit Date:  2020  3:03 PM  
Name:  Jovani Hess Age:  69 y.o. 
:  1945 MRN:  [id number] PCP:  Kevin Brooks MD 
Treatment Team: Attending Provider: Yue Christie MD; Consulting Provider: Clara Rice MD; Utilization Review: Cory Quiñones RN; Hospitalist: Austin Kussmaul, NP; Hospitalist: Johnie Jean NP; Consulting Provider: Deborah Yin MD; Consulting Provider: Alois Dakins, MD; Care Manager: Jemima Dubon; Utilization Review: Todd Rebollar Consulting Provider: Rosa Isela Lira MD; Consulting Provider: Zahira Macario MD; Consulting Provider: Yue Christie MD; Consulting Provider: Maximino Santos MD; Primary Nurse: Albert Leal RN 
 
Problem List for this Hospitalization: 
Hospital Problems as of 3/22/2020 Date Reviewed: 3/9/2020 Codes Class Noted - Resolved POA Acute on chronic respiratory failure with hypoxia [facility]) ICD-10-CM: J37.32 
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 
ICD-9-CM: 250.40, 583.81  8/26/2019 - Present Yes Obesity, morbid ([facility] (Chronic) ICD-10-CM: E66.01 
ICD-9-CM: 278.01  10/19/2018 - Present Yes DM type 2 (diabetes mellitus, type 2) (HCC) (Chronic) ICD-10-CM: E11.9 ICD-9-CM: 250.00  1/14/2013 - Present Yes HLD (hyperlipidemia) (Chronic) ICD-10-CM: A80.9 ICD-9-CM: 272.4  1/14/2013 - Present Yes RESOLVED: Acute-on-chronic kidney injury ([facility] ICD-10-CM: N17.9, N18.9 ICD-9-CM: 584.9, 585.9  2/17/2020 - 2/25/2020 Yes RESOLVED: Atelectasis ICD-10-CM: J98.11 ICD-9-CM: 518.0  2/17/2020 - 2/25/2020 Yes RESOLVED: Fluid overload ICD-10-CM: E87.70 ICD-9-CM: 276.69  2/15/2020 - 2/25/2020 Yes RESOLVED: Pleural effusion ICD-10-CM: J90 ICD-9-CM: 511.9  2/3/2020 - 2/25/2020 Yes * (Principal) RESOLVED: Acute hypoxemic respiratory failure ([facility] ICD-10-CM: J96.01 
ICD-9-CM: 518.81  1/10/2020 - 2/25/2020 Yes RESOLVED: HTN (hypertension) ICD-10-CM: I10 
ICD-9-CM: 401.9  1/14/2013 - 2/25/2020 Yes Admission HPI from 2/13/2020: This patient was admitted with the history of shortness of breath hypotension and extreme weakness please refer to the history and physical for more details patient was admitted to ICU He has history of respiratory failure diabetes mellitus coronary artery disease has had aortic valve replacement and bypass surgery about 6 weeks prior to admission here He also has respiratory failure with pneumonia and was treated intensively by the intensivist nephrology Has known history of chronic kidney disease and was acutely worsening and required multiple dialysis Hospital Course: 
Patient had a prolonged stay in the hospital with multiple medical problem including fluid overload heart failure hypotension pneumonia and sepsis he also has psychiatric issues with depression and suicidal ideation He was eventually transferred to floor continued her dialysis he had a left thigh wound which is being treated now and overall is slightly better now He breathing is still better his mobility is quite reduced Patient has had psychiatric evaluation and it was felt that the patient does have depression but is not suicidal and involuntary FCI was not necessary He does have diabetes mellitus and blood sugars have been erratically controlled and he was also on some steroids which has not been discontinued antibiotics have been taken off He still requires dialysis and being followed by nephrology and he will need further treatment with the Coumadin to be adjusted Request dialysis as well as follow-up with pulmonary as well as cardiology Disposition: [facility] Watson Activity: Activity as tolerated Diet: DIET DIABETIC CONSISTENT CARB Regular DIET NUTRITIONAL SUPPLEMENTS Breakfast; Nepro ( ) Code Status: DNR Follow up instructions, discharge meds at bottom of this note. Plan was discussed with patient  And  Nurse. All questions answered. Patient was stable at time of discharge. Patient will call a physician or return if any concerns. Discharge summary and other info was sent to PCP electronically via \"Comm Mgt\" link in [facility], if possible. Diagnostic Imaging/Tests:  
Xr Chest Sngl V Result Date: 3/22/2020 EXAM: XR CHEST SNGL V INDICATION: Respiratory failure COMPARISON: 3/21/2020 FINDINGS: A portable AP radiograph of the chest was obtained at 0504 hours. The patient is on a cardiac monitor. Left lower lobe airspace disease and left pleural effusion unchanged. The cardiac and mediastinal contours and pulmonary vascularity are normal.  The bones and soft tissues are grossly within normal limits. IMPRESSION: Left lower lobe atelectasis or pneumonia and left pleural effusion unchanged.  
 
Xr Chest Sngl V 
 
 Result Date: 3/21/2020 EXAM: XR CHEST SNGL V INDICATION: monitor L atelectasis vs effusion COMPARISON: 3/20/2020 FINDINGS: A portable AP radiograph of the chest was obtained at 0424 hours. The patient is on a cardiac monitor. Left lower lobe airspace disease and left pleural effusion unchanged. Dialysis catheter is in place. The cardiac and mediastinal contours and pulmonary vascularity are normal.  The bones and soft tissues are grossly within normal limits. IMPRESSION: Left lower lobe atelectasis or pneumonia left pleural effusion unchanged. Xr Chest Sngl V Result Date: 3/20/2020 Clinical history: Follow-up TECHNIQUE: AP portable chest COMPARISON: March 19, 2020. FINDINGS: Right-sided cardiac pacer, left-sided dialysis catheter and sternotomy wires are stable. No pneumothorax. Heart is enlarged. Mediastinal contour is within normal limits. There is persistent left lung base opacity. IMPRESSION: 1. Persistent left lung base opacity, likely combination of small effusion and airspace disease (atelectasis or pneumonia). 2. No significant change compared to prior exam. 
 
Xr Chest Sngl V Result Date: 3/19/2020 Clinical history: Follow-up. TECHNIQUE: AP portable chest COMPARISON: Yesterday. FINDINGS: Stable postsurgical changes of sternotomy. Right cardiac pacer and left dialysis catheter are stable. Heart is enlarged. There is persistent left lung base opacity. There is vascular congestion. No pneumothorax. IMPRESSION: 1. Persistent left lung base opacity, likely atelectasis or pneumonia. Xr Chest Sngl V Result Date: 3/18/2020 Portable chest x-ray CLINICAL INDICATION: Left-sided atelectasis or pleural effusion Finding: Single AP view the chest compared to a similar exam dated 3/17/2020 shows dense airspace consolidation the left mid and lower lung unchanged from the prior exam. The lungs are underexpanded. Post cardiac surgery changes are noted.  A right-sided pacer device is in place. There is a left-sided dialysis catheter in place. IMPRESSION: No significant interval change. Xr Chest Sngl V Result Date: 3/17/2020 Chest X-ray INDICATION: Yes follow-up infiltrates A portable AP view of the chest was obtained. FINDINGS: There is stable bibasilar infiltrate, left greater than right. Small left effusion may also be present. There is stable cardiomegaly. Sternotomy changes are present. Pacemaker and central line are also present. IMPRESSION: Stable bibasilar infiltrates, left greater than right Xr Chest Sngl V Result Date: 3/16/2020 EXAM: CHEST X-RAY, 1 VIEW INDICATION: monitor L atelectasis vs effusion. COMPARISON: Chest x-ray 3/14/2020 TECHNIQUE: Single AP view of the chest was obtained. FINDINGS: Left IJ approach dialysis catheter terminates in the SVC. Pacer leads project over the right atrium and ventricle. Aortic valve prosthesis noted. Lungs remain diminished in volume. The pulmonary vasculature is indistinct. Persistence of left basilar/retrocardiac opacification. The cardiac silhouette is partially obscured but appear stable. No pneumothorax is evident. The bones are unchanged. IMPRESSION: 1.  Pulmonary vascular congestion. 2.  Persistent left basilar/retrocardiac opacification reflecting atelectasis, infiltrate, effusion, or a combination thereof. Xr Chest Sngl V Result Date: 3/14/2020 EXAMINATION: CHEST RADIOGRAPH 3/14/2020 5:45 PM ACCESSION NUMBER: [id number] COMPARISON: 03/12/2020 INDICATION: left pleural effusion TECHNIQUE: A single view of the chest was obtained. FINDINGS: Support Devices: There is a right chest wall pacer with wires unchanged in position from prior exam. There is a left IJ permacath with the tip at the level of the right atrium. Cardiac Silhouette: There is previous valvuloplasty. Mediastinum: Calcifications are seen in the aorta.  Lungs: Again seen is a left pleural effusion with basilar atelectasis or infiltrate. This is unchanged from the prior exam. Upper Abdomen: Normal Osseous Structures: There are no lytic and blastic lesions. IMPRESSION: Persistent left pleural effusion with associated basilar infiltrate or atelectasis Atherosclerosis Xr Chest Sngl V Result Date: 3/12/2020 CHEST RADIOGRAPH, 1 views, 3/12/2020 History: Moderate shortness of breath today. Technique: Portable frontal view of the chest. Comparison: Chest radiograph 3/10/2020 Findings: A stable left internal jugular dialysis catheter, and right-sided venous port are seen. Stable post surgical changes overlie the mediastinal silhouette. Slight improved aeration is seen of the lungs. The heart is mild to moderately enlarged although stable. Lungs are expanded without pneumothorax. Left basilar airspace changes and and likely small effusion are seen which are not significantly changed. IMPRESSION: 1. Slight improved aeration with otherwise stable findings as described above. Xr Chest Sngl V Result Date: 3/10/2020 EXAM: Chest x-ray. INDICATION: Dyspnea. COMPARISON: Yesterday's chest x-ray. TECHNIQUE: Frontal view chest x-ray. FINDINGS: Pulmonary vascular congestion and more focal left lung base opacity, which probably relates to atelectasis or infiltrate and pleural effusion, are unchanged. Again noted is cardiomegaly, a prior sternotomy, a prosthetic heart valve and a right chest wall pacemaker. No pneumothorax is identified. The left-sided dialysis catheter remains in place. IMPRESSION: No interval change. Xr Chest Sngl V Result Date: 3/9/2020 EXAM: Chest x-ray. INDICATION: Dyspnea. COMPARISON: Yesterday's chest x-ray. TECHNIQUE: Frontal view chest x-ray. FINDINGS: Left lung base atelectasis or infiltrate and a small left pleural effusion are unchanged. There is progressed vascular congestion.  Again noted is cardiomegaly, a prior sternotomy, a prosthetic heart valve and a right chest wall pacemaker. No pneumothorax is identified. The left-sided dialysis catheter remains in place. IMPRESSION: 1. Unchanged left lung base atelectasis or infiltrate and small pleural effusion. 2. Progressed pulmonary vascular congestion. Xr Chest Sngl V Result Date: 3/8/2020 EXAM: Chest x-ray. INDICATION: Dyspnea. COMPARISON: Yesterday's chest x-ray. TECHNIQUE: Frontal view chest x-ray. FINDINGS: Previous left lung edema or infiltrate and pleural effusion has improved. The right lung remains clear. Again noted is cardiomegaly, a prior sternotomy, a prosthetic heart valve, a right chest wall pacemaker and a left-sided dialysis catheter. No pneumothorax is identified. IMPRESSION: Improving left lung edema or infiltrate and pleural effusion. Xr Chest Sngl V Result Date: 3/7/2020 EXAM: Chest x-ray. INDICATION: Dyspnea. COMPARISON: March 5, 2020. TECHNIQUE: Frontal view chest x-ray. FINDINGS: There is progressed diffuse left lung edema or infiltrate and moderate pleural effusion. The right lung and pleural space are clear. No pneumothorax is identified. Again noted is cardiomegaly, a prior sternotomy, a prosthetic heart valve, a right chest wall pacemaker and a left-sided dialysis catheter. IMPRESSION: Progressed left lung edema or infiltrate and moderate pleural effusion. Xr Chest Sngl V Result Date: 3/5/2020 History: Shortness of breath with diminished left airway sounds Exam: portable chest Comparison: 3/4/2020 Findings: There is dense consolidation again noted at the left lung base, increased when compared with the prior exam. There is also a left pleural effusion. The right lung is clear. No change in the appearance of the support monitoring devices. Impressions: Increasing left basilar airspace consolidation. Xr Chest Sngl V Result Date: 3/4/2020 Clinical History: The patient is a 69years year old Male presenting with symptoms of Leukocytosis Comparison:  Chest x-ray 2/17/2020 Findings:  Frontal view of the chest was obtained. There is continued central pulmonary vascular congestion with elevation the left hemidiaphragm and probable small left basilar effusion. A right subclavian dual-lead pacing device remains in place. There are sternotomy changes. The cardiomediastinal silhouette is within normal limits. There are no acute osseous abnormalities. A left jugular dialysis catheter is in place Impression:. Continued CHF/volume overload. Underlying left basilar infiltrate/atelectasis cannot be excluded. CPT code(s) 31415 Xr Chest Sngl V Result Date: 1/31/2020 Chest X-ray INDICATION: Shortness of breath A portable AP view of the chest was obtained. FINDINGS: There are stable bilateral pulmonary infiltrates, most prominent in the left lung base. Left pleural effusion is also probably present. There is stable cardiomegaly. Sternotomy changes and pacemaker are present. Central line is in stable position. IMPRESSION: Stable bilateral infiltrates and left pleural effusion Xr Chest Sngl V Result Date: 1/28/2020 EXAM: Chest x-ray. INDICATION: Dyspnea. COMPARISON: January 26, 2020. TECHNIQUE: Frontal view chest x-ray. FINDINGS: There is progressed pulmonary vascular congestion. Again noted is bibasilar lung atelectasis and a small left pleural effusion. The heart is enlarged, with a prior sternotomy, a prosthetic heart valve and a right chest wall pacemaker. No pneumothorax is identified. Bilateral central lines remain in place. IMPRESSION: Progressed pulmonary vascular congestion. Xr Chest Sngl V Result Date: 1/26/2020 EXAM: Chest x-ray. INDICATION: Dyspnea. COMPARISON: Yesterday's chest x-ray. TECHNIQUE: Frontal view chest x-ray. FINDINGS: Bibasilar lung atelectasis and a small left pleural effusion are unchanged. Again noted is cardiomegaly, a prior sternotomy and a right chest wall pacemaker. There is no pneumothorax. Support catheters appear to be in good position. IMPRESSION: Unchanged bibasilar lung atelectasis and small left pleural effusion. Xr Chest Sngl V Result Date: 1/25/2020 EXAM: Chest x-ray. INDICATION: Dyspnea. COMPARISON: Yesterday's chest x-ray. TECHNIQUE: Frontal view chest x-ray. FINDINGS: Bibasilar lung atelectasis is unchanged. There may be a small left pleural effusion as well. The cardiac size is stable, with evidence of a prior sternotomy. No pneumothorax is identified. There is a right chest wall pacemaker. Support catheters appear unchanged. IMPRESSION: Unchanged bibasilar lung atelectasis and possible small left pleural effusion. Xr Chest Sngl V Result Date: 1/24/2020 Portable chest x-ray CLINICAL INDICATION: Shortness of breath, evaluate for possible pneumothorax FINDINGS: Single AP view of the chest compared to a similar exam from earlier the same day show the lungs to be slightly underexpanded. No pneumothorax appreciated. The support devices are in apparent satisfactory position. IMPRESSION: No significant change in the cardiopulmonary structures or support devices. No pneumothorax visualized. Xr Chest Sngl V Result Date: 1/24/2020 CHEST RADIOGRAPH, 1 views, 1/24/2020 History: Post PPM Technique: Portable frontal view of the chest. Comparison: Chest radiograph 1/23/2020 Findings: The endotracheal tube, feeding tube, right internal jugular sheath, and left subclavian line are unchanged. A right-sided pacing device is now seen. Worsening aeration is seen of the lungs. An abnormal edge is seen at the right costophrenic angle without a clear etiology visualized. The possibility of a small right anterior pneumothorax cannot be excluded. No evidence for tension is seen. Moderate cardiomegaly is seen which is not significantly changed when the worsening aeration is taken into consideration.  Basilar airspace changes and small left basilar effusion are also seen which are not significantly changed. IMPRESSION: 1. Abnormal edge of the right costophrenic angle. Although not clearly indicative of, the possibility of a small anterior pneumothorax is difficult to exclude given the appearance. No evidence for tension is seen. 2. Worsening aeration of the lungs with otherwise stable findings as described above. The questionable small right anterior pneumothorax was discussed with Roni Lin, the nurse care for the patient myself personally at 3:20 PM on 1/24/2020. Xr Chest Sngl V Result Date: 1/23/2020 EXAMINATION: CHEST RADIOGRAPH 1/23/2020 7:10 AM ACCESSION NUMBER: [id number] COMPARISON:  01/21/2020 INDICATION: intubated TECHNIQUE: A single view of the chest was obtained. FINDINGS: Support Devices: An endotracheal tube is seen in the mid trachea. There is a left subclavian catheter with the tip in the superior vena cava. There is a right IJ catheter with the tip near the brachiocephalic vein. There is partial visualization of a gastric tube. The tip is not visualized. Cardiac Silhouette: There is previous valvuloplasty. Heart size is normal. Mediastinum: Calcifications are seen in the aorta. Lungs: There is persistent atelectasis or consolidation in the left base. This is similar to the prior exam.. Upper Abdomen: Normal Osseous Structures: There are no lytic and blastic lesions. IMPRESSION: Stable abnormal exam with tubes and lines as described above Atherosclerosis Xr Chest Sngl V Result Date: 1/21/2020 EXAM: Single view chest radiograph. INDICATION: 74 years respiratory distress. COMPARISON: Chest radiograph dated January 21, 2020. FINDINGS: Left subclavian central venous catheter tip terminating in the SVC. Right IJ center venous catheter tip terminating in the more proximal SVC. Endotracheal tube appears in appropriate position.  Enteric tube is evident however is limited in evaluation possibly terminating in the mid thoracic esophagus. Epicardial pacing wires. Sternal wires are intact. No pneumothorax or definite pleural effusion. Unchanged mild cardiomegaly. No new lung consolidation. Aortic valve replacement evident. IMPRESSION: 1. Unchanged cardiomegaly. No new lung consolidation. 2. Endotracheal tube and central venous catheters as above which appear in appropriate position. Cannot well evaluate the enteric tube, cannot confirm appropriate enteric tube placement. Xr Chest Sngl V Result Date: 1/21/2020 EXAM: TEMPORARY INDICATION: Aortic valve replacement COMPARISON: 1/20/2020 FINDINGS: A portable AP radiograph of the chest was obtained at 0356 hours. The patient is on a cardiac monitor. Diffuse increased interstitial markings unchanged. The cardiac and mediastinal contours and pulmonary vascularity are normal.  The bones and soft tissues are grossly within normal limits. IMPRESSION: Pulmonary edema unchanged. Xr Chest Sngl V Result Date: 1/20/2020 EXAM: XR CHEST SNGL V INDICATION: s/p cardiac surgery/vent patient COMPARISON: 1/19/2020 FINDINGS: A portable AP radiograph of the chest was obtained at 0341 hours. The patient is on a cardiac monitor. I lateral airspace disease and right pleural effusion unchanged. The cardiac and mediastinal contours and pulmonary vascularity are normal.  The bones and soft tissues are grossly within normal limits. IMPRESSION: Pulmonary edema and right pleural effusion unchanged. Xr Chest Sngl V Result Date: 1/19/2020 EXAM: XR CHEST SNGL V INDICATION: Coronary artery disease COMPARISON: 1/18/2020 FINDINGS: A portable AP radiograph of the chest was obtained at 0334 hours. The patient is on a cardiac monitor. Bibasilar airspace disease. Worse in the interval. The cardiac and mediastinal contours and pulmonary vascularity are normal.  The bones and soft tissues are grossly within normal limits.   
 
IMPRESSION: Findings most consistent with pulmonary edema worse in the interval. 
 
Xr Chest Sngl V Result Date: 1/17/2020 CHEST X-RAY, one view. HISTORY:  Pneumothorax status post open heart surgery, follow-up. TECHNIQUE:  AP upright portable view COMPARISON: Yesterday's exam FINDINGS:   Small right apical pneumothorax is even smaller. Increased atelectasis or infiltrate at both bases. ET tube dialysis type catheter introducer sheath and sternal wires remain. IMPRESSION:  Decreased pneumothorax. Increased atelectasis or infiltrate at both bases. Xr Chest Sngl V Result Date: 1/16/2020 Clinical history: Post chest tube removal. TECHNIQUE: AP portable chest COMPARISON: Yesterday. FINDINGS: Previous left-sided chest tube is no longer seen. Endotracheal and enteric tubes and left-sided subclavian catheter are stable. Right Adrian-Nallely catheter has been removed. Right IJ sheath is still present. Postsurgical changes of sternotomy. There is vascular congestion. Bibasilar opacities, likely atelectasis. There is suggestion of small right apical pneumothorax. There is tiny left apical pneumothorax. IMPRESSION: 1. Previous left chest tube is no longer seen. Tiny right apical pneumothorax. 2. Suggestion of small right apical pneumothorax. 3. Bibasilar atelectasis and vascular congestion, similar to prior. Xr Chest Sngl V Result Date: 1/15/2020 Portable view of the chest COMPARISON: Yesterday. CLINICAL HISTORY: Postop. FINDINGS: Stable postsurgical changes of sternotomy. Endotracheal tube, right Adrian-Nallely catheter and left chest tube are stable. Enteric tube is not well-seen. Bibasilar opacities, likely atelectasis. There is vascular congestion. No pneumothorax. Cardiomediastinal contour and the surrounding bones are stable. IMPRESSION: 1. Stable post op findings. No significant change in the appearance of the lungs. No pneumothorax. Xr Chest Sngl V Result Date: 1/14/2020 Portable view of the chest COMPARISON: Yesterday.  CLINICAL HISTORY: Postop. FINDINGS: Stable postsurgical changes of sternotomy. Endotracheal tube, enteric tube, right-sided Phoenix-Nallely catheter and left chest tube are stable. There is stable left subclavian catheter. Cardiac mediastinal contour and the surrounding bones are stable. No pneumothorax. There is vascular congestion. Bibasilar opacities, likely atelectasis. IMPRESSION: 1. Stable post op findings. ET tube tip is in good position. 2. Vascular congestion and bibasilar atelectasis, similar to prior exam. 
 
Xr Chest Sngl V Result Date: 1/13/2020 Portable view of the chest COMPARISON: Yesterday CLINICAL HISTORY: Postop, follow-up. FINDINGS: Endotracheal tube, enteric tube, right-sided Phoenix-Nallely catheter, and left chest tube are stable. Cardiac silhouette is enlarged, similar to prior exam. Bibasilar opacities, likely atelectasis. There is vascular congestion. No pneumothorax. Left-sided subclavian catheter is stable. IMPRESSION: 1. Stable post op findings. ET tube tip is in good position. 2. Bibasilar atelectasis and vascular congestion. No pneumothorax. 3. No significant change compared to prior exam. 
 
Xr Chest Sngl V Result Date: 1/12/2020 Portable view of the chest COMPARISON: January 12, 2020. CLINICAL HISTORY: Subclavian line placement FINDINGS: There is a new left subclavian catheter with the tip in the area of SVC/right atrium junction. Right Phoenix-Nallely catheter, enteric tube and endotracheal tube and left-sided chest tube are stable. There is vascular congestion. Bibasilar opacities, likely atelectasis. No significant pneumothorax. Cardiomediastinal contour and the surrounding bones are stable. Stable postsurgical changes of sternotomy. IMPRESSION: 1. New left subclavian catheter. No significant pneumothorax. 2. Vascular congestion and bibasilar atelectasis. Xr Chest Sngl V Result Date: 1/12/2020  Portable view of the chest Clinical history: Worsening hypercapnia, on vent COMPARISON: January 11, 2020. FINDINGS: Stable postsurgical changes of sternotomy. Endotracheal tube, enteric tube, right-sided Geraldine-Nallely catheter and left-sided chest tube are stable. There is small right apical pneumothorax. There is vascular congestion. Bibasilar opacities, likely atelectasis. Cardiomediastinal contour and the surrounding bones are stable. IMPRESSION: 1. Small right apical pneumothorax, appearing slightly decreased since the prior exam. 2. Vascular congestion and bibasilar atelectasis. Xr Chest Sngl V Result Date: 1/11/2020 PORTABLE CHEST, January 11, 2020 at 1200 hours CLINICAL HISTORY:  Follow-up right pneumothorax. COMPARISON:  0359 hours today. FINDINGS:  AP erect image demonstrates persistent small right apical pneumothorax. The heart remains enlarged with pulmonary venous congestion and bibasilar atelectasis status post CABG. Endotracheal, feeding, and left chest tubes as well as Geraldine-Nallely catheter remain in place. IMPRESSION:  NO SIGNIFICANT INTERVAL CHANGE, INCLUDING THE SMALL RIGHT APICAL PNEUMOTHORAX. Xr Chest Sngl V Result Date: 1/11/2020 Portable view of the chest COMPARISON: January 10, 2020 CLINICAL HISTORY: Postop. FINDINGS: Stable postsurgical changes of sternotomy. Endotracheal tube, enteric tube and right-sided Geraldine-Nallely catheter and left-sided chest tube are stable. There is vascular congestion. Bibasilar opacities, likely atelectasis. There is small right apical pneumothorax, similar to prior exam. Cardiac mediastinal contour and the surrounding bones are stable. IMPRESSION: 1. Stable small right apical pneumothorax. 2. Vascular congestion and bibasilar atelectasis. No change in the appearance of the lungs. Xr Chest Sngl V Result Date: 1/10/2020 Chest, one view, 1/10/2020 Indication:Postop cardiovascular surgery Comparison:1/8/2020 There are post median sternotomy changes.  Support apparatus including Letha-Nallely catheter tip projects over the proximal right pulmonary artery. Endotracheal tube about 6 cm above the radha. There is an NG tube tip of which is not included on this exam. There is also left-sided chest tube. No pneumothorax. Patient appears to have had an aortic valve f replacement. There is perihilar atelectasis and likely a small left effusion. Impression: Recent median sternotomy changes with support apparatus as detailed above. Perihilar and likely a small left effusion. Xr Chest Pa Lat Result Date: 2/7/2020 TWO-VIEW CHEST: CLINICAL HISTORY: Follow-up CABG left leukocytosis in a diabetic. COMPARISON:  February 3, 2020. FINDINGS: PA and lateral chest images demonstrate interval worsening of left basilar consolidation and pleural fluid. There is mild residual right basilar atelectasis. The heart remains enlarged status post CABG without evidence of congestive heart failure or pneumothorax. There are overlying radiopaque support devices. IMPRESSION:  WORSENING LEFT BASILAR CONSOLIDATION IS SUSPICIOUS FOR PNEUMONIA IN THIS CLINICAL SETTING. Xr Chest Pa Lat Result Date: 1/8/2020 Clinical History: The patient is a 69years year old Male presenting for pre-op CABG. Comparison:  none Findings:  Frontal and lateral views of the chest were obtained. Lungs are clear without focal infiltrate or consolidation. No pleural effusions are seen. There are mild underlying changes of COPD. The cardiomediastinal silhouette is within normal limits. There are no acute osseous abnormalities. Previous upper lumbar vertebral augmentation has been performed. Impression:  No acute cardiopulmonary process. CPT code(s) 90828 Ir Insert Sergo Hover Port Over 5 Years Result Date: 2/4/2020 Title: Tunneled hemodialysis catheter placement. Indication:   67-year-old male with acute kidney injury.  A tunneled hemodialysis catheter is placed for this patient through the left internal jugular vein using ultrasound and fluoroscopic guidance with maximum sterile barrier appropriately documented and fluoroscopy time and images documented in the report. :  Conner Ross PA-C Supervising Physician: Yobani Gonzalez M.D. Consent: Informed written and oral consent was obtained from the patient after explanation of benefits and risks (including, but not limited to: Infection, hemorrhage, pneumothorax). The patient's questions were answered to their satisfaction. The patient stated understanding and requested that we proceed. Procedure: This central venous catheter was inserted with all elements of maximal sterile barrier technique, and cap and mask and sterile gown and sterile gloves and sterile full-body drape and hand hygiene and 2% chlorhexidine for cutaneous antisepsis and sterile ultrasound gel and sterile ultrasound probe cover. After the patient was prepped and draped, a local field block with lidocaine was achieved. Ultrasound evaluation of all potential access sites was performed due to lack of a palpable vein. No veins were palpable due to overlying adipose tissue. Using real-time ultrasound guidance, with appropriate image recording and visualization of vascular needle entry, the patent left internal jugular vein was accessed using micropuncture technique. Using fluoroscopy, a peel-away sheath was placed over a wire. A subcutaneous tunnel was anesthetized on the left chest wall. The new catheter was passed down the peel-away sheath and brought through the subcutaneous tunnel. All lumens aspirated easily and were filled with heparinized saline. The venotomy incision was closed with absorbable suture. The catheter was secured with nonabsorbable suture. A sterile Dressing was applied. Complications: None. Radiation dose: Fluoroscopy time: 36 seconds.  Reference air kerma (mGy): 74 Kerma area product (cGy.cm2):  1559 Fluoroscopic images: 1 Contrast: 0. Medications:  Lidocaine . Continuous cardiorespiratory monitoring was employed throughout. Findings:  Patent left internal jugular vein. Catheter tip in the mid right atrium. Impression: Technically successful tunneled hemodialysis catheter placement. Plan: Recover for 1 hour. Catheter is ready for use. Suture should be removed in 2 weeks. Ct Head Wo Cont Result Date: 2/20/2020 EXAM: Noncontrast CT head. INDICATION: Pain, fall injury. COMPARISON: None. TECHNIQUE: Noncontrast CT images of the head were obtained. Radiation dose reduction techniques were used for this study. Our CT scanners use one or all of the following:  Automated exposure control, adjustment of the mA or kV according to patient size, iterative reconstruction. FINDINGS: Brain volume is appropriate for age. No acute infarct, hemorrhage or mass is identified. There is no mass effect, midline shift or depressed fracture. The visualized paranasal sinuses and mastoid air cells are clear. IMPRESSION: No acute process. Ct Chest Wo Cont Result Date: 2/8/2020 CT THORAX WITHOUT CONTRAST HISTORY: persistent L pulmonary infiltrate, hypoxia of unclear etiology, 69 years Male Recent heart attack Hypoxic Poor historian COMPARISON: CTA chest November 20, 2019 TECHNIQUE: Noncontrast axial helical images from the thoracic inlet through diaphragm were obtained. Coronal reformatted images were obtained at the scanner console and made available for review.   All CT scans at this location are performed using dose modulation techniques as appropriate to a performed exam including the following: automated exposure control; adjustment of the mA and/or kV according to patient's size (this includes techniques or standardized protocols for targeted exams where dose is matched to indication/reason for exam; i.e. extremities or head); use of iterative reconstruction technique. FINDINGS: Partially visualized thyroid unremarkable. No evidence of significant mediastinal, or axillary lymphadenopathy. Mild calcific atherosclerosis of a normal caliber aortic arch and descending aorta. Cardiac pacing device leads appear to remain in anatomic position. Again visualized is a dilated esophagus containing layering fluid. New small left pleural effusion with increasing left lower lobe consolidation likely representing acute left lower lobar pneumonia. Persistent bilateral patchy groundglass opacities which may represent a chronic pneumonitis. Visualized proximal airways unremarkable. Calcified splenic granulomas, indicative of prior granulomatous disease. Small nonobstructing bilateral renal medullary level calculi unchanged and only partially visualized here. Visualized upper abdominal viscera including the adrenal glands are otherwise unremarkable. There appear to be multiple chronic healed fracture deformities of the right lower anterolateral ribs. Visualized osseous structures unremarkable. IMPRESSION: 1. Probable acute left lower lobar pneumonia, with an associated small left pleural effusion. 2.  Other chronic findings as above. Ct Chest W Cont Result Date: 2/14/2020 CTA OF THE CHEST - PE STUDY HISTORY: Acute shortness of breath COMPARISON: None TECHNIQUE: A helical acquisition was performed through the chest utilizing 6.53GT slice thickness during the infusion of 100 cc of Isovue-370. 3-D post-processed images were created on an independent workstation. Multiplanar reformats were obtained. The exam was focused on the pulmonary arteries. Dose reduction techniques used: Automated exposure control, adjustment of the mAs and/or kVp according to patient's size, and iterative reconstruction techniques. FINDINGS: *  PULMONARY VESSELS: No evidence of pulmonary embolism.  *  PLEURA / PERICARDIUM: Left pleural effusion. *  CAROL / MEDIASTINUM: Dilated diffuse fluid-filled esophagus. No evidence of a hiatal hernia. *  LUNGS: Consolidation of the entire left lower lobe. Linear atelectasis of the right middle and right upper lobe. *  TRACHEOBRONCHIAL TREE: Within normal limits. *  AORTA: Within normal limits. *  CORONARY ARTERIES: Extensive coronary artery calcification is seen. *  CHEST WALL/AXILLA: Within normal limits. *  VISUALIZED UPPER ABDOMEN: Within normal limits. *  SPINE / BONES: Status post CABG. *  ADDITIONAL COMMENTS: None. IMPRESSION: No evidence of pulmonary embolism. Left lower lobe atelectasis or pneumonia with a left pleural effusion. Linear atelectasis of the right middle and right upper lobes. Dilated diffuse fluid-filled esophagus. No evidence of a hiatal hernia or obstructing mass. I question of the patient could have gastroesophageal reflux disease. Coronary artery disease status post CABG. Date of Dictation: 2/14/2020 12:25 AM 
 
[address] Result Date: 1/11/2020 ULTRASOUND OF THE KIDNEYS AND BLADDER CLINICAL HISTORY:  Acute on chronic kidney disease. COMPARISON:  None. FINDINGS: The examination was limited by the patient's morbid obesity as well as limited mobility and ability to suspend respirations. Multiple images from real time ultrasound evaluation of the kidneys show that they are normal in size and orientation bilaterally. The right kidney measures 12.3 cm in length while the left measures 12.4 cm. No definite solid renal mass, hydronephrosis, stone, or abnormal perinephric collection is seen. The bladder was not distended at the time of examination. IMPRESSION:  Unremarkable, technically limited examination. Xr Chest [facility] Result Date: 2/17/2020 EXAM: Chest x-ray. INDICATION: Dyspnea. COMPARISON: February 13, 2020. TECHNIQUE: Frontal view chest x-ray.  FINDINGS: There is unchanged left lung base opacity, probably a combination of atelectasis or infiltrate and a small pleural effusion. The right lung is clear except for minimal basilar atelectasis. Again noted is cardiomegaly, a prior sternotomy, a right chest wall pacemaker and a prosthetic heart valve. No pneumothorax is identified. The left-sided dialysis catheter remains in place. IMPRESSION: Unchanged left lung base atelectasis or infiltrate and small pleural effusion. Xr Chest [facility] Result Date: 2/13/2020 EXAMINATION: CHEST RADIOGRAPH 2/13/2020 3:55 PM ACCESSION NUMBER: [id number] INDICATION: shob COMPARISON: Chest x-ray 2/8/2020 TECHNIQUE: A single AP view of the chest was obtained. FINDINGS: Support Lines and Tubes: Unchanged cardiac pacemaker positioning. Unchanged left central venous catheter positioning. Cardiac Silhouette: Unchanged enlargement of the cardiac silhouette. Lungs: Unchanged left basilar opacity, moderate left pleural effusion. Improving interstitial edema. Pleura: No pneumothorax. Osseous Structures: Prior median sternotomy. Upper Abdomen: Unremarkable. IMPRESSION: 1. Improving interstitial edema. 2. Unchanged left basilar opacity and left pleural effusion. 3. Unchanged enlargement of the cardiac silhouette. VOICE DICTATED BY: Dr. Nayeli Barrios Xr Chest [facility] Result Date: 2/8/2020 EXAM: XR CHEST PORT INDICATION: s/p cabg COMPARISON: 2/7/2020 FINDINGS: A portable AP radiograph of the chest was obtained at 0200 hours. The patient is on a cardiac monitor. Bilateral airspace disease left pleural effusion worse in the interval. The cardiac and mediastinal contours and pulmonary vascularity are normal.  The bones and soft tissues are grossly within normal limits. IMPRESSION: Pulmonary edema and left pleural effusion worse in the interval. 
 
Xr Chest [facility] Result Date: 2/3/2020 Portable chest xray  COMPARISON: February 2, 2020. CLINICAL HISTORY: Post CABG, follow-up. FINDINGS: Stable postsurgical changes of sternotomy. Right-sided cardiac pacer and left-sided subclavian catheter are stable. Persistent airspace densities in the left lung. No pneumothorax. Cardiac mediastinal contour and the surrounding bones are stable. IMPRESSION: 1. Persistent airspace densities in the left lung, likely atelectasis or infection. Xr Chest [facility] Result Date: 2/2/2020 Portable chest xray  COMPARISON: January 31, 2020 CLINICAL HISTORY: Status post CABG FINDINGS: Sternotomy wires, right-sided cardiac pacer and left-sided subclavian catheter are stable. There is vascular congestion. There is left basilar opacity, likely atelectasis or consolidation. No pneumothorax. Cardiomediastinal contour and the surrounding bones are stable. IMPRESSION: 1. Left basilar opacity, likely atelectasis or consolidation. Vascular congestion. 2. No significant change. Xr Chest [facility] Result Date: 1/18/2020 EXAM: XR CHEST PORT INDICATION: Coronary artery disease COMPARISON: 1/17/2020 FINDINGS: A portable AP radiograph of the chest was obtained at 0500 hours. The patient is on a cardiac monitor. Bibasilar airspace disease improved. The cardiac and mediastinal contours and pulmonary vascularity are normal.  The bones and soft tissues are grossly within normal limits. IMPRESSION: Bibasilar atelectasis improved. Duplex Carotid Bilateral 
 
Result Date: 1/8/2020 History: Preop for CABG/AVR Sonographic evaluation of the carotid arteries was performed bilaterally. PSV and EDV criteria utilized for carotid artery stenosis measurements Grayscale imaging on the right demonstrates mild plaque disease at the carotid bulb. The velocities and ratios are unremarkable. The right vertebral artery demonstrates antegrade flow without evidence of steal. Grayscale imaging on the left demonstrates mild to moderate plaque disease at the carotid bulb. Osseous are elevated in the internal carotid artery measuring 246 cm/s systolic.  Ratio of ICA to CCA is normal at 1.6. The left vertebral artery demonstrates antegrade flow without evidence of steal.  
 
Impression: 1. Less than 50% stenosis of the right internal carotid artery. 2. 50-69 % stenosis of the left internal carotid artery. Duplex Upper Ext Venous Bilat Result Date: 1/19/2020 Bilateral upper extremity Doppler evaluation 01/19/2020 History: Elevated d-dimer, HIT Grayscale, color-flow and Doppler evaluation of the major veins of both upper extremities was performed. No prior studies are available for comparison. Findings: The examination is limited due to patient body habitus. There is occlusive thrombus within the right cephalic vein with nonocclusive thrombus within the right basilic vein. The right internal jugular and innominate veins were unable to be visualized due to a reported PICC line present. The mid to distal left subclavian vein was also unable to be visualized due to external bandaging. The bilateral ulnar, radial, brachial, axillary veins were patent as were the left internal jugular, innominate, basilic and cephalic veins. Impression: 1. Limited examination due to patient body habitus. There is occlusive and nonocclusive thrombus within the right upper extremity as described. 2. Incomplete assessment of right internal jugular, innominate and left subclavian veins as described. Duplex Upper Ext Venous Left Result Date: 3/9/2020 LEFT UPPER EXTREMITY DOPPLER ULTRASOUND 3/9/2020 HISTORY: 4 days of left arm swelling. TECHNIQUE: Grayscale, color Doppler and pulse Doppler imaging the deep veins of left arm was performed from the neck to the elbow. COMPARISON: January 19, 2020 FINDINGS: The right innominate and subclavian veins are patent. The left internal jugular vein innominate and subclavian vein are patent. The left axillary vein is patent. The left brachial veins are patent. The left radial and ulnar veins are patent.  The basilic vein is patent. There is noncompressible superficial venous thrombus within a distal forearm branch of the cephalic vein. IMPRESSION: 1. No DVT demonstrated in the left arm. 2. Thrombosed superficial branch of the left cephalic vein. Duplex Lower Ext Venous Bilat Result Date: 1/19/2020 Bilateral lower extremity duplex venous study, 1/19/2020. CLINICAL HISTORY: HIT positive, high d-dimer. Technique: Grayscale, and duplex Doppler images of the bilateral lower extremity venous vascular system were obtained using an 9 MHz transducer. Compression was performed at multiple levels. FINDINGS: Nonocclusive thrombus is seen in the bilateral popliteal veins. Thrombus particularly in the right popliteal vein appears hypoechoic and therefore more acute DVT cannot be excluded. In addition, occlusive thrombus is seen in the bilateral posterior tibial veins of the calf with additional involvement of the right peroneal vein and potential involvement of the left peroneal vein given that this is not visualized. No venous thrombus is otherwise seen. The distal left femoral vein cannot be assessed due to the presence of an open wound with bandaging. The right common femoral vein is patent. Augmentation was not assessed due to the presence of bilateral lower extremity venous thromboses. The soft tissues appear diffusely edematous. No abnormal defined fluid collection is seen. IMPRESSION: 1. Bilateral lower extremity venous thromboses with left popliteal venous thrombus demonstrating an appearance suggesting more acute thrombus. Echocardiogram results: 
Results for orders placed or performed during the hospital encounter of 02/13/20  
2D ECHO COMPLETE ADULT (TTE) W OR WO CONTR Narrative [facility] [place], [address] [facility] 
[phone] Transthoracic Echocardiogram 
2D, M-mode, Doppler, and Color Doppler Patient: Praneeth Light 
MR #: [id number] : 1945 Age: 69 years Gender: Male Study date: 18-Mar-2020 Account #: [de-identified] Height: 69.7 in 
Weight: 219.6 lb 
BSA: 2.17 mï¾² Status:Routine Location: 824 BP: 85/ 55 Allergies: HEPARIN, AMOXICILLIN, CEPHALEXIN, PENICILLINS Sonographer:  Lyn Tracy [facility] Group:  [facility] Referring Physician:  Carlos Foster MD 
Reading Physician:  Gary De Oliveira MD [facility] INDICATIONS: Orthostatic Hypotension following CABG. *Patient scanned sitting up. Some limited windows. * PROCEDURE: This was a routine study. A transthoracic echocardiogram was 
performed. The study included complete 2D imaging, M-mode, complete spectral 
Doppler, and color Doppler. Intravenous contrast (Definity) was administered. Echocardiographic views were limited by restricted patient mobility and poor 
acoustic window availability. Image quality was adequate. LEFT VENTRICLE: Size was normal. Systolic function was normal. Ejection 
fraction was estimated in the range of 55 % to 60 %. The septal walls appear 
hypokinetic. Wall thickness was normal. There was mild concentric  
hypertrophy. The E/e' ratio was 12.6. Left ventricular diastolic function parameters were 
normal. 
 
VENTRICULAR SEPTUM: There was paradoxical motion. RIGHT VENTRICLE: The size and function appear normal from parasternal window. Difficult to assess apically. Systolic function was normal. The tricuspid jet 
envelope definition was inadequate for estimation of RV systolic pressure. LEFT ATRIUM: The atrium was mildly dilated. RIGHT ATRIUM: Not well visualized. SYSTEMIC VEINS: IVC: The inferior vena cava was normal in size. The 
respirophasic change in diameter was less than 50%. AORTIC VALVE: The valve was not well visualized. The aortic valve area by the 
continuity equation was 1.47 cm2. The peak velocity was 2.41 m/s. The mean 
pressure gradient was 12 mmHg. The peak pressure gradient was 23 mmHg.  The 
 DI=0.47. The AT=70ms. There was mild stenosis. There was no insufficiency. MITRAL VALVE: Valve structure was normal. There was no evidence for stenosis. There was trivial regurgitation. TRICUSPID VALVE: Not well visualized. There was no evidence for stenosis. There 
was no regurgitation. PULMONIC VALVE: Not well visualized. There was no evidence for stenosis. There 
was trivial regurgitation. PERICARDIUM: There was no pericardial effusion. AORTA: The root exhibited normal size. SUMMARY: 
 
-  Left ventricle: Systolic function was normal. Ejection fraction was 
estimated in the range of 55 % to 60 %. The septal walls appear hypokinetic. There was mild concentric hypertrophy. The E/e' ratio was 12.6. 
 
-  Ventricular septum: There was paradoxical motion. -  Left atrium: The atrium was mildly dilated. -  Inferior vena cava, hepatic veins: The respirophasic change in diameter  
was 
less than 50%. -  Aortic valve: There was mild stenosis. The aortic valve area by the 
continuity equation was 1.47 cm2. SYSTEM MEASUREMENT TABLES 
 
2D mode AoR Diam (2D): 2.9 cm 
LA Dimension (2D): 4 cm Left Atrium Systolic Volume Index; Method of Disks, Biplane; 2D mode;: 25  
ml/m2 IVS/LVPW (2D): 1 IVSd (2D): 1.3 cm LVIDd (2D): 4.7 cm 
LVIDs (2D): 3.1 cm 
LVOT Area (2D): 3.1 cm2 LVPWd (2D): 1.3 cm RVIDd (2D): 3.1 cm Unspecified Scan Mode Peak Grad; Mean; Antegrade Flow: 20 mm[Hg] Vmax; Antegrade Flow: 203 cm/s LVOT Diam: 2 cm Prepared and signed by 
 
Satish Mae. MD [facility] Signed 18-Mar-2020 12:27:44 Procedures done this admission: 
Procedure(s): ULTRASOUND All Micro Results Procedure Component Value Units Date/Time CULTURE, RESPIRATORY/SPUTUM/BRONCH Sheree Cordial STAIN [[id number]]  (Abnormal) Collected:  03/09/20 6914 Order Status:  Completed Specimen:  Sputum Updated:  03/12/20 3037 Special Requests: NO SPECIAL REQUESTS GRAM STAIN 10 TO 50 WBC'S/OIF  
   0 TO 2 EPITHELIAL CELLS/OIF  
   FEW GRAM POSITIVE RODS MODERATE YEAST 4+ MUCUS PRESENT Culture result: MODERATE CANDIDA ALBICANS     
 CULTURE, BLOOD [[id number]] Collected:  03/06/20 0944 Order Status:  Completed Specimen:  Blood Updated:  03/11/20 7288 Special Requests: --     
  RIGHT Antecubital 
  
  Culture result: NO GROWTH 5 DAYS     
 CULTURE, BLOOD [[id number]] Collected:  03/06/20 1964 Order Status:  Completed Specimen:  Blood Updated:  03/11/20 5728 Special Requests: --     
  LEFT [facility] 
  
  Culture result: NO GROWTH 5 DAYS     
 CULTURE, RESPIRATORY/SPUTUM/BRONCH Shannan Lewis STAIN [[id number]] Collected:  03/06/20 1600 Order Status:  Canceled Specimen:  Sputum C. DIFFICILE AG & TOXIN A/B [[id number]] Order Status:  Canceled Specimen:  Stool CULTURE, RESPIRATORY/SPUTUM/BRONCH Alena Enriquez [[id number]] Collected:  02/17/20 1220 Order Status:  Completed Specimen:  Sputum from Bronchial Washing Updated:  02/24/20 1244 Special Requests: NO SPECIAL REQUESTS     
  GRAM STAIN 20 TO 35 WBCS SEEN PER OIF  
   1 TO 2 EPITHELIAL CELLS SEEN PER OIF  
      
  MODERATE GRAM POSITIVE COCCI  
     
   FEW GRAM POSITIVE RODS Culture result:    
  LIGHT NORMAL RESPIRATORY BRENNAN Labs: Results:  
   
BMP, Mg, Phos Recent Labs  
  03/22/20 0622 03/21/20 
0558 03/20/20 
0427 * 136 136  
K 4.2 4.1 4.2  100 102 CO2 27 28 29 AGAP 8 8 5*  
BUN 22 33* 23  
CREA 4.05* 5.11* 3.80* CA 8.8 8.6 8.3 * 90 147* CBC Recent Labs  
  03/22/20 
0622 03/20/20 
0427 WBC 9.9  --   
RBC 3.76*  --   
HGB 10.9* 10.2* HCT 35.0* 32.1*  
  --   
  
LFT No results for input(s): SGOT, ALT, TBIL, AP, TP, ALB, GLOB, AGRAT, GPT in the last 72 hours. Cardiac Testing Lab Results Component Value Date/Time Troponin-I, Qt. <0.02 (L) 02/13/2020 03:44 PM  
  
Coagulation Tests Lab Results Component Value Date/Time Prothrombin time 41.6 (H) 03/22/2020 06:22 AM  
 Prothrombin time 43.0 (H) 03/21/2020 04:08 PM  
 Prothrombin time 37.7 (H) 03/21/2020 05:58 AM  
 INR 4.2 (HH) 03/22/2020 06:22 AM  
 INR 4.4 (HH) 03/21/2020 04:08 PM  
 INR 3.7 (HH) 03/21/2020 05:58 AM  
 aPTT 40.6 (H) 03/20/2020 04:27 AM  
 aPTT 63.7 (H) 03/19/2020 04:06 AM  
 aPTT 61.1 (H) 03/18/2020 04:38 AM  
  
A1c Lab Results Component Value Date/Time Hemoglobin A1c 7.6 (H) 01/08/2020 08:28 AM  
 Hemoglobin A1c 7.0 (H) 09/04/2019 09:07 AM  
 Hemoglobin A1c 7.1 (H) 02/08/2019 12:49 PM  
  
Lipid Panel Lab Results Component Value Date/Time Cholesterol, total 123 09/04/2019 09:07 AM  
 HDL Cholesterol 32 (L) 09/04/2019 09:07 AM  
 LDL, calculated 68 09/04/2019 09:07 AM  
 VLDL, calculated 23 09/04/2019 09:07 AM  
 Triglyceride 167 (H) 01/20/2020 03:14 AM  
  
Thyroid Panel Lab Results Component Value Date/Time TSH 0.808 03/10/2020 07:30 AM  
 TSH 1.620 09/04/2019 09:07 AM  
 T4, Total 4.2 (L) 05/20/2016 10:09 AM  
 T3 Uptake 33 05/20/2016 10:09 AM  
    
Most Recent UA Lab Results Component Value Date/Time  Color GREEN 01/14/2020 07:51 PM  
 Appearance CLOUDY 01/14/2020 07:51 PM  
 Specific gravity Cannot be calculated 01/14/2020 07:51 PM  
 pH (UA) Cannot be calculated 01/14/2020 07:51 PM  
 Protein Cannot be calculated (A) 01/14/2020 07:51 PM  
 Glucose Cannot be calculated (A) 01/14/2020 07:51 PM  
 Ketone Cannot be calculated (A) 01/14/2020 07:51 PM  
 Bilirubin Cannot be calculated (A) 01/14/2020 07:51 PM  
 Blood Cannot be calculated (A) 01/14/2020 07:51 PM  
 Urobilinogen Cannot be calculated 01/14/2020 07:51 PM  
 Nitrites Cannot be calculated (A) 01/14/2020 07:51 PM  
 Leukocyte Esterase Cannot be calculated (A) 01/14/2020 07:51 PM  
 WBC  01/14/2020 07:51 PM  
 RBC  01/14/2020 07:51 PM  
 Epithelial cells 0-3 01/14/2020 07:51 PM  
 Bacteria 2+ (H) 01/14/2020 07:51 PM  
 Casts 0 01/14/2020 07:51 PM  
 Crystals, urine 0 01/14/2020 07:51 PM  
 Mucus 0 01/14/2020 07:51 PM  
  
 
Allergies Allergen Reactions  Heparin Other (comments) HIT antibody test strongly positive on 1/17/2020. SHARON drawn 1/18/2020 and resulted positive on 1/21/2020  Amoxicillin Rash  Keflex [Cephalexin] Rash  Pcn [Penicillins] Other (comments) \"felt closed in\". No rash Immunization History Administered Date(s) Administered  Influenza High Dose Vaccine PF 09/20/2017, 09/20/2018, 10/02/2019  Influenza Vaccine 07/01/2012, 10/18/2013, 09/22/2014, 09/25/2015, 10/03/2016  Pneumococcal Conjugate (PCV-13) 09/25/2015  Pneumococcal Polysaccharide (PPSV-23) 10/03/2016  TB Skin Test (PPD) Intradermal 01/10/2020, 03/03/2020  Td, Adsorbed PF 02/17/2016  Zoster Recombinant 10/02/2019  Zoster Vaccine, Live 02/18/2016 All Labs from Last 24 Hrs: 
Recent Results (from the past 24 hour(s)) PROTHROMBIN TIME + INR Collection Time: 03/21/20  4:08 PM  
Result Value Ref Range Prothrombin time 43.0 (H) 12.0 - 14.7 sec INR 4.4 (HH) GLUCOSE, POC Collection Time: 03/21/20  5:19 PM  
Result Value Ref Range Glucose (POC) 298 (H) 65 - 100 mg/dL GLUCOSE, POC Collection Time: 03/21/20  8:39 PM  
Result Value Ref Range Glucose (POC) 262 (H) 65 - 100 mg/dL GLUCOSE, POC Collection Time: 03/22/20  5:36 AM  
Result Value Ref Range Glucose (POC) 157 (H) 65 - 100 mg/dL METABOLIC PANEL, BASIC Collection Time: 03/22/20  6:22 AM  
Result Value Ref Range Sodium 135 (L) 136 - 145 mmol/L Potassium 4.2 3.5 - 5.1 mmol/L Chloride 100 98 - 107 mmol/L  
 CO2 27 21 - 32 mmol/L Anion gap 8 7 - 16 mmol/L Glucose 171 (H) 65 - 100 mg/dL BUN 22 8 - 23 MG/DL Creatinine 4.05 (H) 0.8 - 1.5 MG/DL  
 GFR est AA 19 (L) >60 ml/min/1.73m2 GFR est non-AA 15 (L) >60 ml/min/1.73m2 Calcium 8.8 8.3 - 10.4 MG/DL PROTHROMBIN TIME + INR  
 Collection Time: 03/22/20  6:22 AM  
Result Value Ref Range Prothrombin time 41.6 (H) 12.0 - 14.7 sec INR 4.2 ()    
CBC W/O DIFF Collection Time: 03/22/20  6:22 AM  
Result Value Ref Range WBC 9.9 4.3 - 11.1 K/uL  
 RBC 3.76 (L) 4.23 - 5.6 M/uL  
 HGB 10.9 (L) 13.6 - 17.2 g/dL HCT 35.0 (L) 41.1 - 50.3 % MCV 93.1 79.6 - 97.8 FL  
 MCH 29.0 26.1 - 32.9 PG  
 MCHC 31.1 (L) 31.4 - 35.0 g/dL  
 RDW 16.2 (H) 11.9 - 14.6 % PLATELET 900 042 - 619 K/uL MPV 10.3 9.4 - 12.3 FL ABSOLUTE NRBC 0.00 0.0 - 0.2 K/uL GLUCOSE, POC Collection Time: 03/22/20 11:38 AM  
Result Value Ref Range Glucose (POC) 232 (H) 65 - 100 mg/dL Current Med List in Hospital:  
Current Facility-Administered Medications Medication Dose Route Frequency  Warfarin on Hold   Other Rx Dosing/Monitoring  furosemide (LASIX) tablet 40 mg  40 mg Oral DAILY  alum-mag hydroxide-simeth (MYLANTA) oral suspension 30 mL  30 mL Oral Q4H PRN  
 sertraline (ZOLOFT) tablet 100 mg  100 mg Oral DAILY  buPROPion [facility]) tablet 75 mg  75 mg Oral BID  loratadine (CLARITIN) tablet 10 mg  10 mg Oral DAILY  diphenhydrAMINE (BENADRYL) capsule 25 mg  25 mg Oral Q6H PRN  mirtazapine (REMERON) tablet 15 mg  15 mg Oral QHS  ALPRAZolam (XANAX) tablet 0.5 mg  0.5 mg Oral Q6H PRN  
 bacitracin 500 unit/gram ointment   Topical BID  insulin lispro (HUMALOG) injection   SubCUTAneous AC&HS  
 dextrose 40% (GLUTOSE) oral gel 1 Tube  15 g Oral PRN  
 glucagon (GLUCAGEN) injection 1 mg  1 mg IntraMUSCular PRN  
 dextrose (D50W) injection syrg 12.5-25 g  25-50 mL IntraVENous PRN  
 HYDROcodone-homatropine (HYCODAN) 5-1.5 mg/5 mL (5 mL) syrup 5 mL  5 mL Oral Q4H PRN  
 benzonatate (TESSALON) capsule 100 mg  100 mg Oral TID PRN  
 guaiFENesin ER (MUCINEX) tablet 1,200 mg  1,200 mg Oral Q12H  
 albuterol (PROVENTIL VENTOLIN) nebulizer solution 2.5 mg  2.5 mg Nebulization Q6H RT  
  insulin glargine (LANTUS) injection 5 Units  5 Units SubCUTAneous QHS  sodium chloride (OCEAN) 0.65 % nasal squeeze bottle 2 Spray  2 Spray Both Nostrils Q2H PRN  
 morphine injection 2 mg  2 mg IntraVENous Q6H PRN  
 sodium chloride 3% hypertonic nebulizer soln  4 mL Nebulization BID RT  
 sodium chloride (OCEAN) 0.65 % nasal squeeze bottle 2 Spray  2 Spray Both Nostrils BID  epoetin mraitza-epbx (RETACRIT) 14,000 Units combo injection  14,000 Units SubCUTAneous Q7D  
 polyethylene glycol (MIRALAX) packet 17 g  17 g Oral DAILY  midodrine (PROAMITINE) tablet 10 mg  10 mg Oral TID WITH MEALS  tamsulosin (FLOMAX) capsule 0.4 mg  0.4 mg Oral QHS  loperamide (IMODIUM) capsule 4 mg  4 mg Oral QID PRN  pantothenic ac-min oil-pet,hyd (AQUAPHOR) 41 % ointment   Topical DAILY  traMADol (ULTRAM) tablet 50 mg  50 mg Oral Q6H PRN  
 sodium chloride (NS) flush 5-40 mL  5-40 mL IntraVENous PRN  
 acetaminophen (TYLENOL) tablet 650 mg  650 mg Oral Q4H PRN  
 naloxone (NARCAN) injection 0.4 mg  0.4 mg IntraVENous PRN  
 ondansetron (ZOFRAN) injection 4 mg  4 mg IntraVENous Q4H PRN  
 bisacodyL (DULCOLAX) tablet 5 mg  5 mg Oral DAILY PRN Discharge Exam: 
Patient Vitals for the past 24 hrs: 
 Temp Pulse Resp BP SpO2  
03/22/20 1208 98.4 °F (36.9 °C) 86 19 122/63 100 % 03/22/20 0849     96 % 03/22/20 0810 97.3 °F (36.3 °C) 89 19 148/82 95 % 03/22/20 0336 98 °F (36.7 °C) 84 19 129/67 95 % 03/21/20 2327 98.4 °F (36.9 °C) 91 19 143/80 95 % 03/21/20 1936 98.1 °F (36.7 °C) 93 18 143/76 96 % 03/21/20 1924     95 % 03/21/20 1844  94     
03/21/20 1457 97.9 °F (36.6 °C) (!) 103 16 114/62 (!) 89 % 03/21/20 1439     91 %  
03/21/20 1341    103/67  Oxygen Therapy O2 Sat (%): 100 % (03/22/20 1208) Pulse via Oximetry: 84 beats per minute (03/21/20 1924) O2 Device: Nasal cannula (03/22/20 0849) PEEP/CPAP (cm H2O): 4 cm H20 (03/18/20 2012) O2 Flow Rate (L/min): 3 l/min(decreased to 2 lpm) (03/22/20 0849) O2 Temperature: 87.8 °F (31 °C) (03/10/20 0618) FIO2 (%): 35 % (03/19/20 8926) Estimated body mass index is 34.72 kg/m² as calculated from the following: 
  Height as of this encounter: 5' 10\" (1.778 m). Weight as of this encounter: 109.8 kg (242 lb). Intake/Output Summary (Last 24 hours) at 3/22/2020 1226 Last data filed at 3/22/2020 5098 Gross per 24 hour Intake 118 ml Output 200 ml Net -82 ml *Note that automatically entered I/Os may not be accurate; dependent on patient compliance with collection and accurate  by assistants. General:    Well nourished. Alert. Eyes:   Normal sclerae. Extraocular movements intact. ENT:  Normocephalic, atraumatic. Moist mucous membranes CV:   Regular rate and rhythm. No murmur, rub, or gallop. Lungs:  Clear to auscultation bilaterally. No wheezing, rhonchi, or rales. Abdomen: Soft, nontender, nondistended. Bowel sounds normal.  
Extremities: Warm and dry. No cyanosis or edema. Neurologic: CN II-XII grossly intact. No gross focal deficits Skin:     No rashes or jaundice. Psych:  Normal mood and affect. Discharge Info:  
Current Discharge Medication List  
  
START taking these medications Details  
acetaminophen (TYLENOL) 325 mg tablet Take 2 Tabs by mouth every four (4) hours as needed for Pain for up to 3 days. Qty: 12 Tab, Refills: 0  
  
bacitracin (BACITRACIN) 500 unit/gram oint Apply  to affected area two (2) times a day. Qty: 30 g, Refills: 0  
  
benzonatate (TESSALON) 100 mg capsule Take 1 Cap by mouth three (3) times daily as needed for Cough for up to 7 doses. Qty: 21 Cap, Refills: 0  
  
buPROPion (WELLBUTRIN) 75 mg tablet Take 1 Tab by mouth two (2) times a day for 14 days. Qty: 28 Tab, Refills: 0  
  
diphenhydrAMINE (BENADRYL) 25 mg capsule Take 1 Cap by mouth every six (6) hours as needed for Itching for up to 10 days. Qty: 10 Cap, Refills: 0  
  
furosemide (LASIX) 40 mg tablet Take 1 tablet daily AM 
Qty: 14 Tab, Refills: 0  
  
loratadine (CLARITIN) 10 mg tablet Take 1 Tab by mouth daily. Qty: 4 Tab, Refills: 0  
  
midodrine (PROAMITINE) 10 mg tablet Take 1 Tab by mouth three (3) times daily (with meals) for 30 days. Qty: 90 Tab, Refills: 0  
  
polyethylene glycol (MIRALAX) 17 gram packet Take 1 Packet by mouth daily for 7 doses. Qty: 170 g, Refills: 0  
  
sertraline (ZOLOFT) 100 mg tablet Take 1 Tab by mouth daily for 30 days. Qty: 30 Tab, Refills: 0  
  
tamsulosin (FLOMAX) 0.4 mg capsule Take 1 Cap by mouth nightly for 30 days. Qty: 30 Cap, Refills: 0 CONTINUE these medications which have CHANGED Details  
insulin glargine (LANTUS) 100 unit/mL injection Per sliding scale Qty: 1 Vial, Refills: 0 CONTINUE these medications which have NOT CHANGED Details  
warfarin (COUMADIN) 2.5 mg tablet Take 1 Tab by mouth every evening. Qty: 30 Tab, Refills: 1  
  
busPIRone (BUSPAR) 10 mg tablet Take 1 Tab by mouth three (3) times daily. Qty: 270 Tab, Refills: 3 Associated Diagnoses: Anxiety  
  
allopurinol (ZYLOPRIM) 300 mg tablet Take 1 Tab by mouth daily. 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  
  
coenzyme q10-vitamin e (COQ10 ) 100-100 mg-unit cap Take 300 mg by mouth daily. ascorbic acid (VITAMIN C) 500 mg tablet Take 1,000 mg by mouth daily. multivitamins-minerals-lutein (CENTRUM SILVER) Tab Take 1 tablet by mouth daily. STOP taking these medications  
  
 levoFLOXacin (LEVAQUIN) 500 mg tablet Comments:  
Reason for Stopping:   
   
 mirtazapine (REMERON) 15 mg tablet Comments:  
Reason for Stopping: ALPRAZolam (XANAX) 0.25 mg tablet Comments:  
Reason for Stopping:   
   
 loperamide (IMODIUM) 2 mg capsule Comments:  
Reason for Stopping:   
   
 traMADol (ULTRAM) 50 mg tablet Comments: Reason for Stopping:   
   
 albuterol (PROVENTIL HFA) 90 mcg/actuation inhaler Comments:  
Reason for Stopping:   
   
 lutein 20 mg tab Comments:  
Reason for Stopping: Follow Up Orders: Follow-up Appointments Procedures  FOLLOW UP VISIT Appointment in: Two Weeks Follow up with Pulmonology Follow up with Pulmonology Standing Status:   Standing Number of Occurrences:   1 Order Specific Question:   Appointment in Answer: Two Weeks Follow-up Information Follow up With Specialties Details Why Contact Info [address] [place] [id number] 
[phone] Kari Tello MD [place] [address] [facility] 
[phone] Time spent in patient discharge planning and coordination 35 minutes.  
 
Signed: 
Brian Hannah MD

## 2020-03-22 NOTE — PROGRESS NOTES
Dressing change to sacrum, no drainage observed, site cleaned with dermal wound cleanser, NS soaked 4x4's applied, allewyn gentle border applied.

## 2020-03-22 NOTE — PROGRESS NOTES
Patient d/c will be cancelled. Haven't been approved by insurance. CM made MD aware of patient d/c need to be cancelled. CM will continue to monitor.

## 2020-03-22 NOTE — PROGRESS NOTES
TRANSFER - OUT REPORT: 
 
Verbal report given to Sharyle Cal RN(name) on Renea Menard  being transferred to 604(unit) for routine progression of care Report consisted of patients Situation, Background, Assessment and  
Recommendations(SBAR). Information from the following report(s) SBAR, ED Summary and MAR was reviewed with the receiving nurse. Lines:  
Peripheral IV 03/16/20 Left; Lower Forearm (Active) Site Assessment Clean, dry, & intact 3/22/2020  7:57 AM  
Phlebitis Assessment 0 3/22/2020  7:57 AM  
Infiltration Assessment 0 3/22/2020  7:57 AM  
Dressing Status Clean, dry, & intact 3/22/2020  7:57 AM  
Dressing Type Tape;Transparent 3/22/2020  7:57 AM  
Hub Color/Line Status Patent 3/22/2020  7:57 AM  
Alcohol Cap Used No 3/21/2020  7:15 PM  
  
 
Opportunity for questions and clarification was provided.    
 
Patient transported with:

## 2020-03-22 NOTE — PROGRESS NOTES
[facility] PROGRESS NOTE Follow up for: DEJUAN/CKD Subjective:  
Patient seen and examined. No complaints. ROS: 
Gen - no fever, no chills, appetite okay CV - no chest pain, no orthopnea Lung - no shortness of breath, no cough GI- no N/V/D Ext/Access - no edema Objective:  
Exam: 
Vitals:  
 03/21/20 2327 03/22/20 7297 03/22/20 0810 03/22/20 6806 BP: 143/80 129/67 148/82 Pulse: 91 84 89 Resp: 19 19 19 Temp: 98.4 °F (36.9 °C) 98 °F (36.7 °C) 97.3 °F (36.3 °C) SpO2: 95% 95% 95% 96% Weight:  109.8 kg (242 lb) Height:      
 
 
 
Intake/Output Summary (Last 24 hours) at 3/22/2020 9520 Last data filed at 3/21/2020 1740 Gross per 24 hour Intake 368 ml Output 3100 ml Net -2732 ml Current Facility-Administered Medications Medication Dose Route Frequency  Warfarin on Hold   Other Rx Dosing/Monitoring  furosemide (LASIX) tablet 40 mg  40 mg Oral DAILY  alum-mag hydroxide-simeth (MYLANTA) oral suspension 30 mL  30 mL Oral Q4H PRN  
 sertraline (ZOLOFT) tablet 100 mg  100 mg Oral DAILY  buPROPion [facility]) tablet 75 mg  75 mg Oral BID  loratadine (CLARITIN) tablet 10 mg  10 mg Oral DAILY  diphenhydrAMINE (BENADRYL) capsule 25 mg  25 mg Oral Q6H PRN  mirtazapine (REMERON) tablet 15 mg  15 mg Oral QHS  ALPRAZolam (XANAX) tablet 0.5 mg  0.5 mg Oral Q6H PRN  
 bacitracin 500 unit/gram ointment   Topical BID  insulin lispro (HUMALOG) injection   SubCUTAneous AC&HS  
 dextrose 40% (GLUTOSE) oral gel 1 Tube  15 g Oral PRN  
 glucagon (GLUCAGEN) injection 1 mg  1 mg IntraMUSCular PRN  
 dextrose (D50W) injection syrg 12.5-25 g  25-50 mL IntraVENous PRN  
 HYDROcodone-homatropine (HYCODAN) 5-1.5 mg/5 mL (5 mL) syrup 5 mL  5 mL Oral Q4H PRN  
 benzonatate (TESSALON) capsule 100 mg  100 mg Oral TID PRN  
 guaiFENesin ER (MUCINEX) tablet 1,200 mg  1,200 mg Oral Q12H  
 albuterol (PROVENTIL VENTOLIN) nebulizer solution 2.5 mg  2.5 mg Nebulization Q6H RT  
 insulin glargine (LANTUS) injection 5 Units  5 Units SubCUTAneous QHS  sodium chloride (OCEAN) 0.65 % nasal squeeze bottle 2 Spray  2 Spray Both Nostrils Q2H PRN  
 morphine injection 2 mg  2 mg IntraVENous Q6H PRN  
 sodium chloride 3% hypertonic nebulizer soln  4 mL Nebulization BID RT  
 sodium chloride (OCEAN) 0.65 % nasal squeeze bottle 2 Spray  2 Spray Both Nostrils BID  epoetin maritza-epbx (RETACRIT) 14,000 Units combo injection  14,000 Units SubCUTAneous Q7D  
 polyethylene glycol (MIRALAX) packet 17 g  17 g Oral DAILY  midodrine (PROAMITINE) tablet 10 mg  10 mg Oral TID WITH MEALS  tamsulosin (FLOMAX) capsule 0.4 mg  0.4 mg Oral QHS  loperamide (IMODIUM) capsule 4 mg  4 mg Oral QID PRN  pantothenic ac-min oil-pet,hyd (AQUAPHOR) 41 % ointment   Topical DAILY  traMADol (ULTRAM) tablet 50 mg  50 mg Oral Q6H PRN  
 sodium chloride (NS) flush 5-40 mL  5-40 mL IntraVENous PRN  
 acetaminophen (TYLENOL) tablet 650 mg  650 mg Oral Q4H PRN  
 naloxone (NARCAN) injection 0.4 mg  0.4 mg IntraVENous PRN  
 ondansetron (ZOFRAN) injection 4 mg  4 mg IntraVENous Q4H PRN  
 bisacodyL (DULCOLAX) tablet 5 mg  5 mg Oral DAILY PRN  
 
 
EXAM 
GEN - Alert, oriented, in no distress CV - regular rate, S1, S2, no Rub Lung - clear to auscultation bilaterally Abd - soft, nontender, BS present Ext/ Access - no edema, Left TCC- intact Recent Labs  
  03/22/20 
0622 03/20/20 
0427 WBC 9.9  --   
HGB 10.9* 10.2* HCT 35.0* 32.1*  
  --   
  
 
Recent Labs  
  03/22/20 
0622 03/21/20 
0558 03/20/20 
0427 * 136 136  
K 4.2 4.1 4.2  100 102 CO2 27 28 29 BUN 22 33* 23  
CREA 4.05* 5.11* 3.80* CA 8.8 8.6 8.3 * 90 147* Assessment and Plan:  
 
1) DEJUAN on CKD-  No sign of recovery. HD dependent since Jan, '20- likely ESRD 
-Next HD will be Tuesday. 2) Hypotension-  On midodrine prior to dialysis 3)  Hypoxic respiratory failure- on 3L O2, cxr low lung volumes with airspace consolidation left mid and lower lung. 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  
 
Kade Addison MD

## 2020-03-22 NOTE — PROGRESS NOTES
Pt resting in bed comfortably at this time, alert and oriented times 3. No distress noted, respirations even and unlabored. Pt denies pain at this time. Dialysis access clean dry and intact, no signs of infection noted. Pt instructed to call for assistance if needed, call light in place, will continue to monitor.

## 2020-03-23 NOTE — PROGRESS NOTES
Problem: Mobility Impaired (Adult and Pediatric) Goal: *Acute Goals and Plan of Care (Insert Text) Description LTG: (Reviewed and updated 3/13/20) (1.)Mr. Alaina Mcnally will move from supine to sit and sit to supine , scoot up and down and roll side to side INDEPENDENTLY within 7 treatment day(s) from flat surface. (2.)Mr. Alaina Mcnally will transfer from bed to chair and chair to bed with STAND BY ASSIST using the least restrictive device within 7 treatment day(s). (3.)Mr. Alaina Mcnally will ambulate with CONTACT GUARD ASSIST for 75+ feet with the least restrictive device within 7 treatment day(s) while maintaining normal vital signs. (4.)Mr. Alaina Mcnally will perform exercises per HEP for 10+ minutes to improve strength and mobility within 7 days. ____________________________________________________________________________________________ Outcome: Progressing Towards Goal 
  
PHYSICAL THERAPY: Daily Note and PM 3/23/2020 INPATIENT: PT Visit Days : 6 Payor: [employer] / Plan: Carmen Su / Product Type: [employer] /   
  
NAME/AGE/GENDER: Cally Gandhi is a 69 y.o. male PRIMARY DIAGNOSIS: Acute respiratory failure ([facility] [J96.00] Fluid overload [E87.70] Acute hypoxemic respiratory failure (HCC) Acute hypoxemic respiratory failure (HCC) Procedure(s) (LRB): ULTRASOUND (Bilateral) 6 Days Post-Op ICD-10: Treatment Diagnosis:  
 · Generalized Muscle Weakness (M62.81) · Difficulty in walking, Not elsewhere classified (R26.2) Precaution/Allergies: 
Heparin; Amoxicillin; Keflex [cephalexin]; and Pcn [penicillins] ASSESSMENT:  
 
Mr. Alaina Mcnally presents in supine upon contact and agreeable to PT treatment with encouragement. The patient performed rolling in bed with SBA and needed no cues. He performed supine to sit with CGA and demonstrated good to fair sitting balance at the edge of the bed.   BP taken in sitting: 121/66. The patient performed multiple sit to stand transfers with CGA and verbal cues for technique. Each time attempted to take standing BP, but patient was unable to maintain standing long enough for a reading. He reported that things were going black and jumping around and he needed to sit. The patient continued to participate in several more sit to stands with focus on breathing and looking at one spot, but continued to report that the room was spinning and going black. The patient returned to sitting and then performed sit to supine with SBA. He was able to scoot up in the bed with SBA and position himself for comfort. Needs left in reach. Overall the patient is making very slow progress toward therapy goals as indicated by continued difficulties with dizziness preventing progress. Will continue skilled PT treatment as patient is still below functional baseline. This section established at most recent assessment PROBLEM LIST (Impairments causing functional limitations): 1. Decreased Strength 2. Decreased ADL/Functional Activities 3. Decreased Transfer Abilities 4. Decreased Ambulation Ability/Technique 5. Decreased Balance 6. Decreased Activity Tolerance 7. Increased Fatigue 8. Increased Shortness of Breath 9. Edema/Girth INTERVENTIONS PLANNED: (Benefits and precautions of physical therapy have been discussed with the patient.) 1. Balance Exercise 2. Bed Mobility 3. Family Education 4. Gait Training 5. Home Exercise Program (HEP) 6. Neuromuscular Re-education/Strengthening 7. Therapeutic Activites 8. Therapeutic Exercise/Strengthening 9. Transfer Training TREATMENT PLAN: Frequency/Duration: 3 times a week for duration of hospital stay Rehabilitation Potential For Stated Goals: Good REHAB RECOMMENDATIONS (at time of discharge pending progress):   
Placement:  
It is my opinion, based on this patient's performance to date, that  Manjit Rodriguez may benefit from intensive therapy at a [address] [place] [address] after discharge due to the functional deficits listed above that are likely to improve with skilled rehabilitation and concerns that he/she may be unsafe to be unsupervised at home due to a fall risk, safety concerns for transfers and ambulation at home. Equipment: ? To be determined HISTORY:  
History of Present Injury/Illness (Reason for Referral): Mr. 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 Was DC 2/12/20 from CABG and aortic valve replacement complicated by CKD requiring HD, HIT +, DVTs, wound left thigh on wound vac, pneumonia DC on levaquin Q 48 hours last dose 2/11/20. He was DC on 2 L O2 however has been increased to 4L O2 via NC at STR yesterday when he arrived. Today he was at HD and reports dizziness with sitting up and acute onset of SOB. Son at bedside and states pt was doing ok since DC yesterday until today at HD. INR 2.4. CXR showed improving interstitial edema, unchanged left basilar opacity and left pleural effusion. Pt given lasix in ED. Pt is SOB when talking in short sentences. Denies CP, n/v.  Has had diarrhea however is not new since hospitalization. Past Medical History/Comorbidities:  
Mr. Manjit Rodriguez  has a past medical history of Arthritis, BPH (benign prostatic hyperplasia) (1/14/2013), CAD (coronary artery disease), DM type 2 (diabetes mellitus, type 2) ([facility] (dx 2004), Dyspnea, Gout (1/14/2013), HLD (hyperlipidemia) (1/14/2013), HTN (hypertension), Morbid obesity ([facility] (9/3/14), Psychiatric disorder, Rheumatic fever, Seasonal allergic rhinitis, Severe aortic valve stenosis, Thyroid disease, and Unspecified sleep apnea (2016).   Mr. Manjit Rodriguez  has a past surgical history that includes hx tonsil and adenoidectomy; hx heart catheterization (12/23/2019); hx orthopaedic (Left); hx cataract removal (Bilateral, 2012); and ir insert tunl cvc w port over 5 years (2/4/2020). Social History/Living Environment:  
Home Environment: Private residence # Steps to Enter: 2 Rails to Enter: No 
One/Two Story Residence: One story Living Alone: No 
Support Systems: Spouse/Significant Other/Partner Patient Expects to be Discharged to[de-identified] Rehabilitation facility Current DME Used/Available at Home: Cane, straight, Walker, rolling Tub or Shower Type: Shower Prior Level of Function/Work/Activity: 
Lives with spouse, admit from rehab; has been using RW short distance ambulation, assist ADLs Personal Factors:   
      Sex:  male Age:  69 y.o. Overall Behavior:  agreeable Number of Personal Factors/Comorbidities that affect the Plan of Care: 
CAD, DM, DVTs, age 3+: HIGH COMPLEXITY EXAMINATION:  
Most Recent Physical Functioning:  
Gross Assessment: 
  
         
  
Posture: 
  
Balance: 
Sitting: Impaired Sitting - Static: Good (unsupported) Sitting - Dynamic: Fair (occasional) Standing: Impaired Standing - Static: Fair Standing - Dynamic : Fair Bed Mobility: 
Rolling: Stand-by assistance Supine to Sit: Contact guard assistance Sit to Supine: Contact guard assistance Scooting: Contact guard assistance Wheelchair Mobility: 
  
Transfers: 
Sit to Stand: Contact guard assistance Stand to Sit: Contact guard assistance Gait: 
  
   
  
Body Structures Involved: 1. Heart 2. Lungs 3. Muscles Body Functions Affected: 1. Cardio 2. Respiratory 3. Movement Related Activities and Participation Affected: 1. General Tasks and Demands 2. Mobility 3. Self Care Number of elements that affect the Plan of Care: 4+: HIGH COMPLEXITY CLINICAL PRESENTATION:  
Presentation: Evolving clinical presentation with changing clinical characteristics: MODERATE COMPLEXITY CLINICAL DECISION MAKING:  
[facility] AM-PAC 6 Clicks Basic Mobility Inpatient Short Form How much difficulty does the patient currently have. .. Unable A Lot A Little None 1. Turning over in bed (including adjusting bedclothes, sheets and blankets)? [] 1   [] 2   [x] 3   [] 4  
2. Sitting down on and standing up from a chair with arms ( e.g., wheelchair, bedside commode, etc.)   [] 1   [] 2   [x] 3   [] 4  
3. Moving from lying on back to sitting on the side of the bed? [] 1   [x] 2   [] 3   [] 4 How much help from another person does the patient currently need. .. Total A Lot A Little None 4. Moving to and from a bed to a chair (including a wheelchair)? [] 1   [] 2   [x] 3   [] 4  
5. Need to walk in hospital room? [] 1   [] 2   [x] 3   [] 4  
6. Climbing 3-5 steps with a railing? [x] 1   [] 2   [] 3   [] 4  
© 2007, Trustees of [facility], under license to [facility]. All rights reserved Score:  Initial: 17 Most Recent: 15 (Date: 3/13/20 ) Interpretation of Tool:  Represents activities that are increasingly more difficult (i.e. Bed mobility, Transfers, Gait). Medical Necessity:    
· Patient is expected to demonstrate progress in  
· strength, range of motion, balance, and coordination ·  to  
· decrease assistance required with overall functional mobility, transfers, ambulation · . · Patient demonstrates · good ·  rehab potential due to higher previous functional level. Reason for Services/Other Comments: 
· Patient · continues to require present interventions due to patient's inability to perform bed mobility, transfers, ambulation safely and effectively · . Use of outcome tool(s) and clinical judgement create a POC that gives a: Clear prediction of patient's progress: LOW COMPLEXITY  
  
 
 
 
TREATMENT:  
(In addition to Assessment/Re-Assessment sessions the following treatments were rendered) Pre-treatment Symptoms/Complaints:  I'm tired Pain: Initial:  
Pain Intensity 1: 0  Post Session: 0/10 Therapeutic Activity: (    38min): Therapeutic activities including Bed transfers, bed mobility, and sit to stand transfer training to improve mobility, strength and BP stability. Required moderate   to promote static and dynamic balance in standing. Date: 
3/13/20 Date: 
 Date: Activity/Exercise Parameters Parameters Parameters LAQ 15x AB Seated marching 15x AB Ankle pumps 15x AB Seated hip aBd     
     
     
     
A=active, B=bilateral 
 
Braces/Orthotics/Lines/Etc:  
· Nasal cannula Treatment/Session Assessment:   
· Response to Treatment:   Slow progress · Interdisciplinary Collaboration:  
o Physical Therapy Assistant 
o Registered Nurse · After treatment position/precautions:  
o Supine in bed 
o Bed/Chair-wheels locked 
o Bed in low position 
o Call light within reach 
o RN notified · Compliance with Program/Exercises: Compliant all of the time · Recommendations/Intent for next treatment session: \"Next visit will focus on advancements to more challenging activities\" as tolerated Total Treatment Duration: PT Patient Time In/Time Out Time In: 3924 Time Out: 1616 Yumiko Uribe PTA

## 2020-03-23 NOTE — PROGRESS NOTES
Patient up with PT/OT tolerated well. Patient continues to request bedpan and urinal despite ambulatory abilities. Bilateral feet and sacral wound changed, clean dry and intact. Remained normal sinus during shift. All hourly rounds performed. Call light within reach. No complaints at this time. Will continue with plan of care and give report to oncoming nurse.

## 2020-03-23 NOTE — PROGRESS NOTES
Patient has multiple scratches to scrotum area, to the point where bleeding occurs from irritation from briefs. Nystatin powder and cream applied to areas.

## 2020-03-23 NOTE — PROGRESS NOTES
[facility] Admission Date: 2/13/2020 Daily Progress Note: 3/23/2020 The patient's chart is reviewed and the patient is discussed with the staff. 68 yo Joane Apley a history of CAD s/p CABG with AVR 1/10/20 by Dr. Mikayla Vargas complicated by CKD requiring HD, HIT +, DVTs, wound left thigh and pneumonia.  Was discharged on Levaquin Q 48 hours last dose 2/11/20. [facility] was discharged on NC 2 L but has been increased to NC 4L O2 at [facility]. He presented to the ER on 2/15 with increased SOB. CTA chest was negative for PE but persistent LLL opacity. He had a bronch 2/17 with negative cultures and was treated for PNA using levaquin. He is now ESRD and being followed by nephrology.  
  
  We were reconsulted on 3/6 for acute respiratory failure requiring bipap. CXR shows increased consolidation in the left base. He is febrile with a temp of 100.5. He has refused a swallow evaluation. ID is following and he was started on Vanc and Cefepime on 3/6. US 3/17 with no pleural effusion. Subjective: On  3L O2. Renal following Feels about the same Current Facility-Administered Medications Medication Dose Route Frequency  Warfarin on Hold   Other Rx Dosing/Monitoring  furosemide (LASIX) tablet 40 mg  40 mg Oral DAILY  alum-mag hydroxide-simeth (MYLANTA) oral suspension 30 mL  30 mL Oral Q4H PRN  
 sertraline (ZOLOFT) tablet 100 mg  100 mg Oral DAILY  buPROPion [facility]) tablet 75 mg  75 mg Oral BID  loratadine (CLARITIN) tablet 10 mg  10 mg Oral DAILY  diphenhydrAMINE (BENADRYL) capsule 25 mg  25 mg Oral Q6H PRN  mirtazapine (REMERON) tablet 15 mg  15 mg Oral QHS  ALPRAZolam (XANAX) tablet 0.5 mg  0.5 mg Oral Q6H PRN  
 bacitracin 500 unit/gram ointment   Topical BID  insulin lispro (HUMALOG) injection   SubCUTAneous AC&HS  
 dextrose 40% (GLUTOSE) oral gel 1 Tube  15 g Oral PRN  
 glucagon (GLUCAGEN) injection 1 mg  1 mg IntraMUSCular PRN  
  dextrose (D50W) injection syrg 12.5-25 g  25-50 mL IntraVENous PRN  
 HYDROcodone-homatropine (HYCODAN) 5-1.5 mg/5 mL (5 mL) syrup 5 mL  5 mL Oral Q4H PRN  
 benzonatate (TESSALON) capsule 100 mg  100 mg Oral TID PRN  
 guaiFENesin ER (MUCINEX) tablet 1,200 mg  1,200 mg Oral Q12H  
 albuterol (PROVENTIL VENTOLIN) nebulizer solution 2.5 mg  2.5 mg Nebulization Q6H RT  
 insulin glargine (LANTUS) injection 5 Units  5 Units SubCUTAneous QHS  sodium chloride (OCEAN) 0.65 % nasal squeeze bottle 2 Spray  2 Spray Both Nostrils Q2H PRN  
 morphine injection 2 mg  2 mg IntraVENous Q6H PRN  
 sodium chloride 3% hypertonic nebulizer soln  4 mL Nebulization BID RT  
 sodium chloride (OCEAN) 0.65 % nasal squeeze bottle 2 Spray  2 Spray Both Nostrils BID  epoetin maritza-epbx (RETACRIT) 14,000 Units combo injection  14,000 Units SubCUTAneous Q7D  
 polyethylene glycol (MIRALAX) packet 17 g  17 g Oral DAILY  midodrine (PROAMITINE) tablet 10 mg  10 mg Oral TID WITH MEALS  tamsulosin (FLOMAX) capsule 0.4 mg  0.4 mg Oral QHS  loperamide (IMODIUM) capsule 4 mg  4 mg Oral QID PRN  pantothenic ac-min oil-pet,hyd (AQUAPHOR) 41 % ointment   Topical DAILY  traMADol (ULTRAM) tablet 50 mg  50 mg Oral Q6H PRN  
 sodium chloride (NS) flush 5-40 mL  5-40 mL IntraVENous PRN  
 acetaminophen (TYLENOL) tablet 650 mg  650 mg Oral Q4H PRN  
 naloxone (NARCAN) injection 0.4 mg  0.4 mg IntraVENous PRN  
 ondansetron (ZOFRAN) injection 4 mg  4 mg IntraVENous Q4H PRN  
 bisacodyL (DULCOLAX) tablet 5 mg  5 mg Oral DAILY PRN Review of Systems Constitutional: negative for fever, chills, sweats Cardiovascular: negative for chest pain, palpitations, syncope, edema Gastrointestinal: negative for dysphagia, reflux, vomiting, diarrhea, abdominal pain, or melena Neurologic: + generalized weakness. negative for focal weakness, numbness, headache Objective:  
 
Vitals: 03/22/20 2340 03/23/20 0350 03/23/20 0701 03/23/20 0830 BP: 153/66 129/61 100/62 Pulse: 90 81 85 Resp: 18 20 20 Temp: 98.6 °F (37 °C) 98.2 °F (36.8 °C) 98.3 °F (36.8 °C) SpO2: 96% 95% 96% 94% Weight:      
Height:      
 
Intake and Output:  
03/21 1901 - 03/23 0700 In: 240 [P.O.:240] Out: 300 [Urine:300] No intake/output data recorded. Physical Exam:  
Constitution:  the patient is well developed and in no acute distress EENMT:  Sclera clear, pupils equal, oral mucosa moist 
Respiratory: CTA B in anterior lung fields. Decreased at B bases L>R Cardiovascular:  RRR without M,G,R 
Gastrointestinal: soft and non-tender; with positive bowel sounds. Musculoskeletal: warm without cyanosis. There is no lower leg edema. Skin:  no jaundice or rashes, no wounds Neurologic: no gross neuro deficits Psychiatric:  alert and oriented x ppt CHEST XRAY:  
 
3/20/20 1. Persistent left lung base opacity, likely combination of small effusion and 
airspace disease (atelectasis or pneumonia). 2. No significant change compared to prior exam. 
 
 
3/19/20 1. Persistent left lung base opacity, likely atelectasis or pneumonia. LAB No lab exists for component: [place] Recent Labs  
  03/23/20 
0536 03/22/20 
0622 03/21/20 
1608 WBC  --  9.9  --   
HGB  --  10.9*  --   
HCT  --  35.0*  --   
PLT  --  255  --   
INR 4.1* 4.2* 4.4* Recent Labs  
  03/23/20 
0536 03/22/20 
0622 03/21/20 
6334 * 135* 136  
K 4.1 4.2 4.1 CL 99 100 100 CO2 27 27 28 * 171* 90 BUN 34* 22 33* CREA 5.19* 4.05* 5.11* CA 8.2* 8.8 8.6 ABG:  No results found for: PH, PHI, PCO2, PCO2I, PO2, PO2I, HCO3, HCO3I, FIO2, FIO2I Assessment:  (Medical Decision Making) Hospital Problems  Date Reviewed: 3/9/2020 Codes Class Noted POA Acute on chronic respiratory failure with hypoxia [facility]) ICD-10-CM: J96.21 
ICD-9-CM: 518.84, 799.02  3/8/2020 Unknown DNR (do not resuscitate) (Chronic) ICD-10-CM: Y63 ICD-9-CM: V49.86  2/26/2020 Yes Acute renal failure on dialysis [facility]) ICD-10-CM: N17.9, Z99.2 ICD-9-CM: 584.9, V45.11  2/25/2020 Yes Hypotension (Chronic) ICD-10-CM: I95.9 ICD-9-CM: 458.9  2/18/2020 Yes Pneumonia due to infectious organism ICD-10-CM: J18.9 ICD-9-CM: 136.9, 484.8  2/14/2020 Unknown HIT (heparin-induced thrombocytopenia) (HCC) (Chronic) ICD-10-CM: U49.81 ICD-9-CM: 289.84  1/23/2020 Yes Atrial fibrillation (HCC) (Chronic) ICD-10-CM: I48.91 
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 
ICD-9-CM: 250.40, 583.81  8/26/2019 Yes Obesity, morbid ([address] (Chronic) ICD-10-CM: E66.01 
ICD-9-CM: 278.01  10/19/2018 Yes DM type 2 (diabetes mellitus, type 2) (HCC) (Chronic) ICD-10-CM: E11.9 ICD-9-CM: 250.00  1/14/2013 Yes HLD (hyperlipidemia) (Chronic) ICD-10-CM: P34.1 ICD-9-CM: 272.4  1/14/2013 Yes Pt with recurrent respiratory failure. Feel like much of this is due to atelectasis/mucus plugging related to generalized weakness and debility. Does not appear to be related to pleural effusion. May be component of pulmonary edema with renal failure. TTE unimpressive. O2 needs are gradually improving back towards his admission O2 levels. Plan:  (Medical Decision Making)  
 
-cont efforts to minimize atelectasis including vibralung, hypertonic saline nebs, and IS.  
-cont to wean o2  
-Continue albuterol.  
- HD per nephrology -s/p abx.   
 
 
 
Liliana Adames MD

## 2020-03-23 NOTE — PROGRESS NOTES
Hospitalist Note Admit Date:  2020  3:03 PM  
Name:  Yvon Melendrez Age:  69 y.o. 
:  1945 MRN:  [id number] PCP:  Nely Gonzalez MD 
Treatment Team: Attending Provider: Maria E Ritter DO; Consulting Provider: Leny Grimm MD; Utilization Review: Alyssia Rock RN; Hospitalist: Radha Childs NP; Hospitalist: Kelly Coronado NP; Consulting Provider: Deborah Yin MD; Consulting Provider: Blanca Lowery MD; Utilization Review: Preston Castaneda; Consulting Provider: Alvena Klinefelter, MD; Consulting Provider: Gabriela Pineda MD; Consulting Provider: Francesca Velasco MD; Consulting Provider: Bartolome Ochoa MD; Primary Nurse: Xiao Young RN; Charge Nurse: Donald Whiting RN; Occupational Therapist: Santi Car OT; Care Manager: Cherisse Najjar, RN 
 
HPI/Subjective:  
Mr. 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 Was DC 20 from CABG and aortic valve replacement complicated by CKD requiring HD, HIT +, DVTs, wound left thigh on wound vac, pneumonia DC on levaquin Q 48 hours last dose 20. He was DC on 2 L O2 however has been increased to 4L O2 via NC at STR yesterday when he arrived. Today he was at HD and reports dizziness with sitting up and acute onset of SOB. Son at bedside and states pt was doing ok since DC yesterday until today at HD. INR 2.4. CXR showed improving interstitial edema, unchanged left basilar opacity and left pleural effusion. Pt given lasix in ED. Pt is SOB when talking in short sentences. Denies CP, n/v.  Has had diarrhea however is not new since hospitalization. Interval History (3/23): patient examined at bedside.  No acute overnight events. He has no new complaints when directly asked. He is concerned about his kidney function overall. He denies chest pain, shortness of breath, abdominal pain, nausea/vomiting or diarrhea. He is not sleeping well, he says he wakes up at 2 AM and cannot fall back asleep. Objective:  
 
Patient Vitals for the past 24 hrs: 
 Temp Pulse Resp BP SpO2  
03/23/20 1913 98.1 °F (36.7 °C) 83 17 145/66 97 % 03/23/20 1519 97.8 °F (36.6 °C) 89 18 133/83 94 % 03/23/20 1433     93 % 03/23/20 1053 97.5 °F (36.4 °C) 89 18 105/58 98 % 03/23/20 0830     94 % 03/23/20 0701 98.3 °F (36.8 °C) 85 20 100/62 96 % 03/23/20 0350 98.2 °F (36.8 °C) 81 20 129/61 95 % 03/22/20 2340 98.6 °F (37 °C) 90 18 153/66 96 % 03/22/20 2002 98.5 °F (36.9 °C) 85 20 117/66 98 % 03/22/20 1923     98 % Oxygen Therapy O2 Sat (%): 97 % (03/23/20 1913) Pulse via Oximetry: 86 beats per minute (03/22/20 1923) O2 Device: Nasal cannula (03/23/20 1433) PEEP/CPAP (cm H2O): 4 cm H20 (03/18/20 2012) O2 Flow Rate (L/min): 4 l/min (03/23/20 1433) O2 Temperature: 87.8 °F (31 °C) (03/10/20 0618) FIO2 (%): 35 % (03/19/20 2328) Estimated body mass index is 34.75 kg/m² as calculated from the following: 
  Height as of this encounter: 5' 10\" (1.778 m). Weight as of this encounter: 109.9 kg (242 lb 3.2 oz). Intake/Output Summary (Last 24 hours) at 3/23/2020 [facility] Last data filed at 3/23/2020 0126 Gross per 24 hour Intake  Output 150 ml Net -150 ml *Note that automatically entered I/Os may not be accurate; dependent on patient compliance with collection and accurate  by techs. General:    Well nourished. Alert.  ,  Not seemingly in acute shortness of breath HEENT              JAIME, no cervical lymphadenopathy or thyroid enlargement CV:   RRR. No murmur, rub, or gallop. Lungs:   CTAB. Some wheezing but improving Abdomen:   Soft, nontender, nondistended. Extremities: Warm and dry. Mild leg edema Skin:     No rashes or jaundice. Neuro:  No gross focal deficits generalized weakness Data Review: 
I have reviewed all labs, meds, and studies from the last 24 hours: 
 
Recent Results (from the past 24 hour(s)) GLUCOSE, POC Collection Time: 03/22/20  8:37 PM  
Result Value Ref Range Glucose (POC) 327 (H) 65 - 100 mg/dL METABOLIC PANEL, BASIC Collection Time: 03/23/20  5:36 AM  
Result Value Ref Range Sodium 132 (L) 136 - 145 mmol/L Potassium 4.1 3.5 - 5.1 mmol/L Chloride 99 98 - 107 mmol/L  
 CO2 27 21 - 32 mmol/L Anion gap 6 (L) 7 - 16 mmol/L Glucose 160 (H) 65 - 100 mg/dL BUN 34 (H) 8 - 23 MG/DL Creatinine 5.19 (H) 0.8 - 1.5 MG/DL  
 GFR est AA 14 (L) >60 ml/min/1.73m2 GFR est non-AA 12 (L) >60 ml/min/1.73m2 Calcium 8.2 (L) 8.3 - 10.4 MG/DL PROTHROMBIN TIME + INR Collection Time: 03/23/20  5:36 AM  
Result Value Ref Range Prothrombin time 40.9 (H) 12.0 - 14.7 sec INR 4.1 (HH) GLUCOSE, POC Collection Time: 03/23/20  6:59 AM  
Result Value Ref Range Glucose (POC) 156 (H) 65 - 100 mg/dL GLUCOSE, POC Collection Time: 03/23/20 11:05 AM  
Result Value Ref Range Glucose (POC) 257 (H) 65 - 100 mg/dL GLUCOSE, POC Collection Time: 03/23/20  3:16 PM  
Result Value Ref Range Glucose (POC) 234 (H) 65 - 100 mg/dL All Micro Results Procedure Component Value Units Date/Time CULTURE, RESPIRATORY/SPUTUM/BRONCH Aster Bible STAIN [[id number]]  (Abnormal) Collected:  03/09/20 7663 Order Status:  Completed Specimen:  Sputum Updated:  03/12/20 9637 Special Requests: NO SPECIAL REQUESTS     
  GRAM STAIN 10 TO 50 WBC'S/OIF  
   0 TO 2 EPITHELIAL CELLS/OIF  
   FEW GRAM POSITIVE RODS MODERATE YEAST 4+ MUCUS PRESENT Culture result: MODERATE CANDIDA ALBICANS     
 CULTURE, BLOOD [[id number]] Collected:  03/06/20 0944 Order Status:  Completed Specimen:  Blood Updated:  03/11/20 2787 Special Requests: --     
  RIGHT Antecubital 
  
  Culture result: NO GROWTH 5 DAYS     
 CULTURE, BLOOD [[id number]] Collected:  03/06/20 9956 Order Status:  Completed Specimen:  Blood Updated:  03/11/20 7854 Special Requests: --     
  LEFT Antecubital 
  
  Culture result: NO GROWTH 5 DAYS     
 CULTURE, RESPIRATORY/SPUTUM/BRONCH Cary Bracket STAIN [[id number]] Collected:  03/06/20 1600 Order Status:  Canceled Specimen:  Sputum C. DIFFICILE AG & TOXIN A/B [[id number]] Order Status:  Canceled Specimen:  Stool CULTURE, RESPIRATORY/SPUTUM/BRONCH Orlene Blades [[id number]] Collected:  02/17/20 1220 Order Status:  Completed Specimen:  Sputum from Bronchial Washing Updated:  02/24/20 1244 Special Requests: NO SPECIAL REQUESTS     
  GRAM STAIN 20 TO 35 WBCS SEEN PER OIF  
   1 TO 2 EPITHELIAL CELLS SEEN PER OIF  
      
  MODERATE GRAM POSITIVE COCCI  
     
   FEW GRAM POSITIVE RODS Culture result:    
  LIGHT NORMAL RESPIRATORY BRENNAN Current Meds: 
Current Facility-Administered Medications Medication Dose Route Frequency  Warfarin on Hold   Other Rx Dosing/Monitoring  furosemide (LASIX) tablet 40 mg  40 mg Oral DAILY  alum-mag hydroxide-simeth (MYLANTA) oral suspension 30 mL  30 mL Oral Q4H PRN  
 sertraline (ZOLOFT) tablet 100 mg  100 mg Oral DAILY  buPROPion [facility]) tablet 75 mg  75 mg Oral BID  loratadine (CLARITIN) tablet 10 mg  10 mg Oral DAILY  diphenhydrAMINE (BENADRYL) capsule 25 mg  25 mg Oral Q6H PRN  mirtazapine (REMERON) tablet 15 mg  15 mg Oral QHS  ALPRAZolam (XANAX) tablet 0.5 mg  0.5 mg Oral Q6H PRN  
 bacitracin 500 unit/gram ointment   Topical BID  insulin lispro (HUMALOG) injection   SubCUTAneous AC&HS  
 dextrose 40% (GLUTOSE) oral gel 1 Tube  15 g Oral PRN  
 glucagon (GLUCAGEN) injection 1 mg  1 mg IntraMUSCular PRN  
  dextrose (D50W) injection syrg 12.5-25 g  25-50 mL IntraVENous PRN  
 HYDROcodone-homatropine (HYCODAN) 5-1.5 mg/5 mL (5 mL) syrup 5 mL  5 mL Oral Q4H PRN  
 benzonatate (TESSALON) capsule 100 mg  100 mg Oral TID PRN  
 guaiFENesin ER (MUCINEX) tablet 1,200 mg  1,200 mg Oral Q12H  
 albuterol (PROVENTIL VENTOLIN) nebulizer solution 2.5 mg  2.5 mg Nebulization Q6H RT  
 insulin glargine (LANTUS) injection 5 Units  5 Units SubCUTAneous QHS  sodium chloride (OCEAN) 0.65 % nasal squeeze bottle 2 Spray  2 Spray Both Nostrils Q2H PRN  
 morphine injection 2 mg  2 mg IntraVENous Q6H PRN  
 sodium chloride 3% hypertonic nebulizer soln  4 mL Nebulization BID RT  
 sodium chloride (OCEAN) 0.65 % nasal squeeze bottle 2 Spray  2 Spray Both Nostrils BID  epoetin maritza-epbx (RETACRIT) 14,000 Units combo injection  14,000 Units SubCUTAneous Q7D  
 polyethylene glycol (MIRALAX) packet 17 g  17 g Oral DAILY  midodrine (PROAMITINE) tablet 10 mg  10 mg Oral TID WITH MEALS  tamsulosin (FLOMAX) capsule 0.4 mg  0.4 mg Oral QHS  loperamide (IMODIUM) capsule 4 mg  4 mg Oral QID PRN  pantothenic ac-min oil-pet,hyd (AQUAPHOR) 41 % ointment   Topical DAILY  traMADol (ULTRAM) tablet 50 mg  50 mg Oral Q6H PRN  
 sodium chloride (NS) flush 5-40 mL  5-40 mL IntraVENous PRN  
 acetaminophen (TYLENOL) tablet 650 mg  650 mg Oral Q4H PRN  
 naloxone (NARCAN) injection 0.4 mg  0.4 mg IntraVENous PRN  
 ondansetron (ZOFRAN) injection 4 mg  4 mg IntraVENous Q4H PRN  
 bisacodyL (DULCOLAX) tablet 5 mg  5 mg Oral DAILY PRN Other Studies: 
Results for orders placed or performed during the hospital encounter of 20  
2D ECHO COMPLETE ADULT (TTE) W OR WO CONTR Narrative [address] [facility] [address] [place] [address] Dr Rodriguez, [address] [facility] 
[phone] Transthoracic Echocardiogram 
2D, M-mode, Doppler, and Color Doppler Patient: Tracie Mina 
MR #: [id number] : 1945 Age: 69 years Gender: Male Study date: 18-Mar-2020 Account #: [de-identified] Height: 69.7 in 
Weight: 219.6 lb 
BSA: 2.17 mï¾² Status:Routine Location: 824 BP: 85/ 55 Allergies: HEPARIN, AMOXICILLIN, CEPHALEXIN, PENICILLINS Sonographer:  Gilbert Manuel RDCS Group:  [facility] Referring Physician:  Ajay Tucker MD 
Reading Physician:  Concepcion De Oliveira MD [facility] INDICATIONS: Orthostatic Hypotension following CABG. *Patient scanned sitting up. Some limited windows. * PROCEDURE: This was a routine study. A transthoracic echocardiogram was 
performed. The study included complete 2D imaging, M-mode, complete spectral 
Doppler, and color Doppler. Intravenous contrast (Definity) was administered. Echocardiographic views were limited by restricted patient mobility and poor 
acoustic window availability. Image quality was adequate. LEFT VENTRICLE: Size was normal. Systolic function was normal. Ejection 
fraction was estimated in the range of 55 % to 60 %. The septal walls appear 
hypokinetic. Wall thickness was normal. There was mild concentric  
hypertrophy. The E/e' ratio was 12.6. Left ventricular diastolic function parameters were 
normal. 
 
VENTRICULAR SEPTUM: There was paradoxical motion. RIGHT VENTRICLE: The size and function appear normal from parasternal window. Difficult to assess apically. Systolic function was normal. The tricuspid jet 
envelope definition was inadequate for estimation of RV systolic pressure. LEFT ATRIUM: The atrium was mildly dilated. RIGHT ATRIUM: Not well visualized. SYSTEMIC VEINS: IVC: The inferior vena cava was normal in size. The 
respirophasic change in diameter was less than 50%. AORTIC VALVE: The valve was not well visualized. The aortic valve area by the 
continuity equation was 1.47 cm2. The peak velocity was 2.41 m/s. The mean 
pressure gradient was 12 mmHg. The peak pressure gradient was 23 mmHg.  The 
 DI=0.47. The AT=70ms. There was mild stenosis. There was no insufficiency. MITRAL VALVE: Valve structure was normal. There was no evidence for stenosis. There was trivial regurgitation. TRICUSPID VALVE: Not well visualized. There was no evidence for stenosis. There 
was no regurgitation. PULMONIC VALVE: Not well visualized. There was no evidence for stenosis. There 
was trivial regurgitation. PERICARDIUM: There was no pericardial effusion. AORTA: The root exhibited normal size. SUMMARY: 
 
-  Left ventricle: Systolic function was normal. Ejection fraction was 
estimated in the range of 55 % to 60 %. The septal walls appear hypokinetic. There was mild concentric hypertrophy. The E/e' ratio was 12.6. 
 
-  Ventricular septum: There was paradoxical motion. -  Left atrium: The atrium was mildly dilated. -  Inferior vena cava, hepatic veins: The respirophasic change in diameter  
was 
less than 50%. -  Aortic valve: There was mild stenosis. The aortic valve area by the 
continuity equation was 1.47 cm2. SYSTEM MEASUREMENT TABLES 
 
2D mode AoR Diam (2D): 2.9 cm 
LA Dimension (2D): 4 cm Left Atrium Systolic Volume Index; Method of Disks, Biplane; 2D mode;: 25  
ml/m2 IVS/LVPW (2D): 1 IVSd (2D): 1.3 cm LVIDd (2D): 4.7 cm 
LVIDs (2D): 3.1 cm 
LVOT Area (2D): 3.1 cm2 LVPWd (2D): 1.3 cm RVIDd (2D): 3.1 cm Unspecified Scan Mode Peak Grad; Mean; Antegrade Flow: 20 mm[Hg] Vmax; Antegrade Flow: 203 cm/s LVOT Diam: 2 cm Prepared and signed by 
 
Sofi Jimenes. MD [facility] [place] 18-Mar-2020 12:27:44 Xr Chest Sngl V Result Date: 3/23/2020 EXAM: TEMPORARY INDICATION: Respiratory failure COMPARISON: 3/22/2020 FINDINGS: A portable AP radiograph of the chest was obtained at 0506 hours. The patient is on a cardiac monitor. Left lower lobe airspace disease and left pleural effusion unchanged. Hardware is unchanged. . The cardiac and mediastinal contours and pulmonary vascularity are normal.  The bones and soft tissues are grossly within normal limits. IMPRESSION: Left lower lobe atelectasis or pneumonia and left pleural effusion unchanged. Assessment and Plan:  
 
Hospital Problems as of 3/23/2020 Date Reviewed: 3/9/2020 Codes Class Noted - Resolved POA Acute on chronic respiratory failure with hypoxia [facility]) ICD-10-CM: Q22.35 
ICD-9-CM: 518.84, 799.02  3/8/2020 - Present Unknown DNR (do not resuscitate) (Chronic) ICD-10-CM: R67 ICD-9-CM: V49.86  2/26/2020 - Present Yes Acute renal failure on dialysis [facility]) ICD-10-CM: N17.9, Z99.2 ICD-9-CM: 584.9, V45.11  2/25/2020 - Present Yes Hypotension (Chronic) ICD-10-CM: I95.9 ICD-9-CM: 458.9  2/18/2020 - Present Yes Pneumonia due to infectious organism ICD-10-CM: J18.9 ICD-9-CM: 136.9, 484.8  2/14/2020 - Present Unknown HIT (heparin-induced thrombocytopenia) (HCC) (Chronic) ICD-10-CM: M29.90 ICD-9-CM: 289.84  1/23/2020 - Present Yes Atrial fibrillation (HCC) (Chronic) ICD-10-CM: I48.91 
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 
ICD-9-CM: 250.40, 583.81  8/26/2019 - Present Yes Obesity, morbid ([address] (Chronic) ICD-10-CM: E66.01 
ICD-9-CM: 278.01  10/19/2018 - Present Yes DM type 2 (diabetes mellitus, type 2) (HCC) (Chronic) ICD-10-CM: E11.9 ICD-9-CM: 250.00  1/14/2013 - Present Yes HLD (hyperlipidemia) (Chronic) ICD-10-CM: O15.2 ICD-9-CM: 272.4  1/14/2013 - Present Yes RESOLVED: Acute-on-chronic kidney injury ([facility] [address] ICD-10-CM: N17.9, N18.9 ICD-9-CM: 584.9, 585.9  2/17/2020 - 2/25/2020 Yes RESOLVED: Atelectasis ICD-10-CM: J98.11 ICD-9-CM: 518.0  2/17/2020 - 2/25/2020 Yes RESOLVED: Fluid overload ICD-10-CM: E87.70 ICD-9-CM: 276.69  2/15/2020 - 2/25/2020 Yes RESOLVED: Pleural effusion ICD-10-CM: J90 ICD-9-CM: 511.9  2/3/2020 - 2/25/2020 Yes * (Principal) RESOLVED: Acute hypoxemic respiratory failure ([address] ICD-10-CM: J96.01 
ICD-9-CM: 518.81  1/10/2020 - 2/25/2020 Yes RESOLVED: HTN (hypertension) ICD-10-CM: I10 
ICD-9-CM: 401.9  1/14/2013 - 2/25/2020 Yes Plan: # Acute hypoxemic respiratory failure with Progressive LLL consolidation - Seen by pulmonary and trying to improve the atelectasis off antibiotics - S/p bronch 2/17. Pulm following. 
- Discussed with DR Lira, he is clear from this point review for discharge to rehab - Nephrology also has cleared him for discharge 
  
# LUE pain 
- US : no DVT but Thrombosed superficial branch of the left cephalic vein. - Pain has improved 
  
ESRD on HD 
- HD per nephro has chronic kidney disease as well - On dialysis which may continue 
  
DM2 
- sliding scale 
- holding Basal/prandial insulins with sugars reasonable overall 
  
# Adjustment disorder - Medication adjusted by psychiatry # History of DVT with + HIT 
- patient without signs of bleeding - INR now  Supra therapeutic  over 4.0  will hold Coumadin today and reevaluate tomorrow 
  
Disposition: Patient medically clear for discharge, awaiting possible discharge to [facility]. Discussed with patient at bedside. All questions answered. Signed: Zac Galindo, DO

## 2020-03-23 NOTE — PROGRESS NOTES
Warfarin dosing per pharmacist 
 
Esha Almeida is a 69 y.o. male. Height: 5' 10\" (177.8 cm)   Weight 103.5 kg (228 lbs) Indication:  AVR and afib Goal INR:  2.5 - 3.5 Home dose:  2.5 mg daily Risk factors or significant drug interactions: history of HIT(SHARON positive on 1/18/20) Other anticoagulants: None Daily Monitoring Date  INR     Warfarin dose HGB              Notes 2/14  2.4  3 mg  8.7 2/15  2.2  4 mg  9.6 2/16  2.1  5 mg  9.8 2/17  2.1  5 mg  9.5 2/18  2.8  2 mg  9.3 2/19  2.5  2 mg  8.7 
2/20  1.9  3 mg   8.8 
2/21  1.9  4 mg  8.6 
2/22  2.1  3 mg  8.5 
2/23  1.9  4 mg  --- 
2/24  1.7  3 mg + 2 mg --- 
2/25  1.7  5 mg  --- 
2/26  1.6  6 mg  --- 
2/27  1.7  6 mg   --- 
2/28  1.7  7.5 mg  --- 
2/29  2.1 ` 6 mg  7.7 
3/1  2.0  7.5 mg  --- 
3/2  2.9  6 mg  --- 
3/3  3.4  5 mg  8.0 
3/4  2.7  7.5mg  8.6 
3/5  2.9  6 mg  8.0 
3/6  3.1  Held  8.4 
3/7  2.6  6 mg  8.0 
3/8  2.7  6mg  --- 
3/9  2.5  6 mg  --- 
3/10  2.7  6 mg  7.6 
3/11  3.3  5 mg  8.3 
3/12  4.3  Hold  8.2 
3/13  3.9  Hold  8.8 
3/14  3.5  Hold  --- 
3/15  2.8  Hold  --- 
3/16  2.5  Hold  --- 
3/17  3.1  6 mg  --- On argatroban 3/18  3.0  6 mg  10.3 On argatroban 3/19  4.1  5 mg  9.8 On argatroban 3/19  2.9  5 mg  9.8 Off argatroban for 2 hours 3/20  2.5  6 mg  10.2 
3/21  3.7  Hold  --- 
3/22  4.2  Hold  10.9 
3/23  4.1  Hold  --- 
  
 
 
Pulmonary performed thoracentesis on 3/17. Patient was bridged with argatroban. INR starting to trend down, but still above goal at 4.1. Will hold warfarin again this evening. Anticipate resuming therapy tomorrow. Pharmacy will continue to follow patient and monitor INR daily. Thank you, 
Nato Diane, PharmD, BCPS Clinical Pharmacist 
[phone]

## 2020-03-23 NOTE — PROGRESS NOTES
Interdisciplinary Rounds completed. Nursing, Case Management, and Physician  present. Plan of care reviewed and updated. Nephrology and pulmonary following. Pt with wound vac and specialty wound bed. Ready for discharge soon, [facility].

## 2020-03-23 NOTE — PROGRESS NOTES
Pt c/o itching and anxiety. Medicated per mar. Had 1 BM this shift. Hourly round completed throughout the shift. No complain of pain and denies any needs at this time Bed in lower position and call light/ personal items within reach. Will continue to monitor and give bed side shift report to on coming day shift nurse. Date 03/22/20 0700 - 03/23/20 6370 03/23/20 0700 - 03/24/20 0732 Shift [id number] 1900-0659 24 Hour Total [id number] [id number] 24 Hour Total  
INTAKE  
P.O. 240  240     
  P. O. 240  240 Shift Total(mL/kg) 822(7.7)  [id number]) OUTPUT Urine(mL/kg/hr) 150(0.1) 150 300 Urine Voided 150 150 300 Shift Total(mL/kg) 150(1.4) 150(1.4) 300(2.7) NET 90 -150 -60 Weight (kg) 109.8 109.9 109.9 109.9 109.9 109.9

## 2020-03-23 NOTE — PROGRESS NOTES
CM reached out to Mary Chang RN with [facility] to make her aware patient is now in 81 and requested for her to notify this CM if he is approved. CM will continue to follow patient during hospitalization for discharge planning and needs. Please consult CM for new needs.

## 2020-03-23 NOTE — PROGRESS NOTES
Problem: Self Care Deficits Care Plan (Adult) Goal: *Acute Goals and Plan of Care (Insert Text) Description Goals per re-evaluation on 3/10/2020: 
1. Patient will complete lower body bathing and dressing with Min A and adaptive equipment as needed. 2. Patient will complete toileting with Min A and adaptive equipment as needed. PROGRESSING 3/20/2020 3. Patient will complete bed mobility from supine to sit with CGA and one verbal cue for placement of UE to assist. PROGRESSING 3/20/2020 4. Patient will complete functional transfer from sit to stand with SBA and use of adaptive equipment as needed. PROGRESSING 3/20/2020 5. Patient will complete grooming standing at sink level with SBA and use of adaptive equipment as needed. PROGRESSING 3/20/2020 6. Patient will perform pursed-lip breathing with one verbal and one visual to increase activity tolerance for performance of seated and standing ADL tasks. PROGRESSING 3/20/2020 Timeframe: 7 visits Outcome: Progressing Towards Goal 
 
OCCUPATIONAL THERAPY: Daily Note and AM  
 3/23/2020 INPATIENT: OT Visit Days: 4 Payor: Ramiro Macedo / Plan: [employer] / Product Type: Managed Care Medicare /  
  
NAME/AGE/GENDER: Carlos Chilel is a 69 y.o. male PRIMARY DIAGNOSIS:  Acute respiratory failure ([facility] [address] [J96.00] Fluid overload [E87.70] Acute hypoxemic respiratory failure (HCC) Acute hypoxemic respiratory failure (HCC) Procedure(s) (LRB): ULTRASOUND (Bilateral) 6 Days Post-Op ICD-10: Treatment Diagnosis:  
 · Generalized Muscle Weakness (M62.81) · Difficulty in walking, Not elsewhere classified (R26.2) · History of falling (Z91.81) Precautions/Allergies: 
   Heparin; Amoxicillin; Keflex [cephalexin]; and Pcn [penicillins] ASSESSMENT:  
 
Mr. Lani Hicks  is a 69 y.o. male admitted from [address] [place] [address] (s/p CABG) with acute respiratory failure. Hx SI.  At baseline lives with wife and reports independence to modified independence ADLs, IADLs, and ambulation without AD. Has had falls during this hospital admission. 3/23/2020: Pt found supine in bed upon arrival, alert and agreeable to OT treatment. Pt practiced bed mobility with Elo/cueing for technique. Fair sitting balance. Pt stood with CGA, transferred to the chair with min A using RW. Pt c/o dizziness, scrambled thoughts, slow responses (per pt perception), and his eyes crossing. Pt briefly slumped to the side and became somewhat less responsive; RN notified. Vitals monitored and all WNL. Pt quickly returned to normal and stated that he felt okay, however immediately became dizzy upon standing again (vitals checked again, still WNL). Pt completed grooming and hygiene and changed gown with set up, UB and LB bathing with min A, unable to tolerate standing long enough to doff brief and complete pericares. Impulsivity and decreased command following noted at times. Pt left seated in chair on wound cushion with call bell within reach. Pt is making slow progress towards goals. See above. Continue OT POC. This section established at most recent assessment PROBLEM LIST (Impairments causing functional limitations): 1. Decreased Strength 2. Decreased ADL/Functional Activities 3. Decreased Transfer Abilities 4. Decreased Ambulation Ability/Technique 5. Decreased Balance 6. Increased Pain 7. Decreased Activity Tolerance 8. Decreased Pacing Skills 9. Increased Fatigue 10. Increased Shortness of Breath 11. Decreased Skin Integrity/Hygeine INTERVENTIONS PLANNED: (Benefits and precautions of occupational therapy have been discussed with the patient.) 1. Activities of daily living training 2. Adaptive equipment training 3. Balance training 4. Clothing management 5. Hygiene training 6. Neuromuscular re-eduation 7. Re-evaluation 8. Therapeutic activity 9. Therapeutic exercise TREATMENT PLAN: Frequency/Duration: Follow patient 3x/week to address above goals. Rehabilitation Potential For Stated Goals: Fair REHAB RECOMMENDATIONS (at time of discharge pending progress):   
Placement: It is my opinion, based on this patient's performance to date, that Mr. Mario Segovia may benefit from intensive therapy at a [address] [place] [address] after discharge due to the functional deficits listed above that are likely to improve with skilled rehabilitation and concerns that he/she may be unsafe to be unsupervised at home due to decreased independence, safety, balance,and activity tolerance. .  
Equipment: ? TBD   
    
 
 
 
OCCUPATIONAL PROFILE AND HISTORY:  
History of Present Injury/Illness (Reason for Referral): 
See H & P Past Medical History/Comorbidities:  
Mr. Mario Segovia  has a past medical history of Arthritis, BPH (benign prostatic hyperplasia) (1/14/2013), CAD (coronary artery disease), DM type 2 (diabetes mellitus, type 2) ([facility] [address] (dx 2004), Dyspnea, Gout (1/14/2013), HLD (hyperlipidemia) (1/14/2013), HTN (hypertension), Morbid obesity ([facility] [address] (9/3/14), Psychiatric disorder, Rheumatic fever, Seasonal allergic rhinitis, Severe aortic valve stenosis, Thyroid disease, and Unspecified sleep apnea (2016). Mr. Mario Segovia  has a past surgical history that includes hx tonsil and adenoidectomy; hx heart catheterization (12/23/2019); hx orthopaedic (Left); hx cataract removal (Bilateral, 2012); and ir insert tunl cvc w port over 5 years (2/4/2020). Social History/Living Environment:  
Home Environment: Private residence # Steps to Enter: 2 Rails to Enter: No 
One/Two Story Residence: One story Living Alone: No 
Support Systems: Spouse/Significant Other/Partner Patient Expects to be Discharged to[de-identified] Rehabilitation facility Current DME Used/Available at Home: Cane, straight, Walker, rolling Tub or Shower Type: Shower Prior Level of Function/Work/Activity: At baseline, patient lives with wife in one story home with 2 [address]ACHAR[address]. Wife home and available 24/7 if needed. Pt is typically Mod I to independent for performance of ADLs and IADLs. Pt typically uses no DME for in home or community mobility, but does have [facility] available if needed. Reports that he is able to walk approximately 200 feet without any DME before having to stop to take a rest break due to becoming SOB. Pt reports that he has multiple falls during acute care stay and vividly describes a fall he had off the bedside commode. Personal Factors:   
      Sex:  male Age:  69 y.o. Number of Personal Factors/Comorbidities that affect the Plan of Care: Extensive review of physical, cognitive, and psychosocial performance (3+):  HIGH COMPLEXITY ASSESSMENT OF OCCUPATIONAL PERFORMANCE[de-identified]  
Activities of Daily Living:  
Basic ADLs (From Assessment) Complex ADLs (From Assessment) Feeding: Minimum assistance Oral Facial Hygiene/Grooming: Minimum assistance Bathing: Moderate assistance Upper Body Dressing: Minimum assistance Lower Body Dressing: Moderate assistance Toileting: Moderate assistance Instrumental ADL Meal Preparation: Total assistance Homemaking: Total assistance Grooming/Bathing/Dressing Activities of Daily Living Grooming Grooming Assistance: Set-up Upper Body Bathing Bathing Assistance: Minimum assistance Lower Body Bathing Bathing Assistance: Minimum assistance Bed/Mat Mobility Supine to Sit: Minimum assistance Sit to Stand: Contact guard assistance Stand to Sit: Contact guard assistance Bed to Chair: Minimum assistance Most Recent Physical Functioning:  
Gross Assessment: 
  
         
  
Posture: 
Posture (WDL): Exceptions to [facility] Posture Assessment: Forward head, Rounded shoulders, Trunk flexion Balance: 
Sitting: Impaired Sitting - Static: Good (unsupported) Sitting - Dynamic: Fair (occasional) Standing: Impaired Standing - Static: Fair Standing - Dynamic : Fair Bed Mobility: 
Supine to Sit: Minimum assistance Wheelchair Mobility: 
  
Transfers: 
Sit to Stand: Contact guard assistance Stand to Sit: Contact guard assistance Bed to Chair: Minimum assistance Patient Vitals for the past 6 hrs: 
 BP BP Patient Position SpO2 O2 Flow Rate (L/min) Pulse 20 0830   94 % 3.5 l/min   
20 1053 105/58 Sitting 98 %  89 Mental Status Neurologic State: Alert Orientation Level: Oriented X4 Cognition: Follows commands Perception: Appears intact Perseveration: No perseveration noted Safety/Judgement: Fall prevention Physical Skills Involved: 
1. Balance 2. Strength 3. Activity Tolerance 4. Fine Motor Control 5. Vision 6. Pain (acute) 7. Skin Integrity Cognitive Skills Affected (resulting in the inability to perform in a timely and safe manner): 1. Perception 2. Sustained Attention 3. Divided Attention Psychosocial Skills Affected: 1. Habits/Routines 2. Environmental Adaptation 3. Social Interaction Number of elements that affect the Plan of Care: 5+:  HIGH COMPLEXITY CLINICAL DECISION MAKIN[address] [place] [id number] AM-PAC 6 Clicks Daily Activity Inpatient Short Form How much help from another person does the patient currently need. .. Total A Lot A Little None 1. Putting on and taking off regular lower body clothing? [] 1   [x] 2   [] 3   [] 4  
2. Bathing (including washing, rinsing, drying)? [] 1   [x] 2   [] 3   [] 4  
3. Toileting, which includes using toilet, bedpan or urinal?   [] 1   [x] 2   [] 3   [] 4  
4. Putting on and taking off regular upper body clothing? [] 1   [] 2   [x] 3   [] 4  
5. Taking care of personal grooming such as brushing teeth? [] 1   [] 2   [x] 3   [] 4  
6. Eating meals? [] 1   [] 2   [x] 3   [] 4  
© , Trustees of [address] [place] [address] [address] [address], under license to [employer]. All rights reserved Score:  Initial: 15, completed, 2/15/2020 Most Recent: 15 completed, (Date: 3/10/2020) Interpretation of Tool:  Represents activities that are increasingly more difficult (i.e. Bed mobility, Transfers, Gait). Medical Necessity:    
· Skilled intervention continues to be required due to decreased independence, safety, balance, and activity tolerance. In addition, pt having increased dizziness that is limiting pt ability to perform ADLs and functional mobility. Howard Kimberley Reason for Services/Other Comments: 
· Patient continues to require skilled intervention due to medical complications and patient unable to attend/participate in therapy as expected. Use of outcome tool(s) and clinical judgement create a POC that gives a: MODERATE COMPLEXITY  
 
 
 
TREATMENT:  
(In addition to Assessment/Re-Assessment sessions the following treatments were rendered) Pre-treatment Symptoms/Complaints:   
Pain: Initial:  
Pain Intensity 1:0 Post Session:  Unchanged Self Care: (38 minutes): Procedure(s) (per grid) utilized to improve and/or restore self-care/home management as related to dressing, bathing and grooming. Required minimal to maximal visual, verbal, manual and tactile cueing to facilitate activities of daily living skills and compensatory activities. Braces/Orthotics/Lines/Etc:  
· IV 
· O2 Device: Nasal cannula (High Flow) Treatment/Session Assessment:   
Response to Treatment: Decreased tolerance, good participation · Interdisciplinary Collaboration:  
o Occupational Therapist 
o Registered Nurse · After treatment position/precautions:  
o Up in chair 
o Bed/Chair-wheels locked 
o Bed in low position 
o Call light within reach 
o RN notified · Compliance with Program/Exercises: Compliant most of the time, Will assess as treatment progresses. · Recommendations/Intent for next treatment session:   \"Next visit will focus on advancements to more challenging activities and reduction in assistance provided\". Total Treatment Duration: OT Patient Time In/Time Out Time In: 6283 Time Out: 1018 Ganesh Carlson, OT

## 2020-03-24 NOTE — DISCHARGE SUMMARY
Discharge Summary Patient: Carrillo Arias MRN: [id number]  SSN: [id number] YOB: 1945  Age: 69 y.o. Sex: male Admit Date: 2/13/2020 Discharge Date: 3/24/2020 Admission Diagnoses: Acute respiratory failure ([address] [J96.00] Fluid overload [E87.70] Discharge Diagnoses:  
Problem List as of 3/24/2020 Date Reviewed: 3/9/2020 Codes Class Noted - Resolved Acute on chronic respiratory failure with hypoxia [facility]) ICD-10-CM: H64.86 
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 
ICD-9-CM: 427.31  1/13/2020 - Present Aortic valve stenosis (Chronic) ICD-10-CM: I35.0 ICD-9-CM: 424.1  1/10/2020 - Present CAD (coronary artery disease) (Chronic) ICD-10-CM: I25.10 ICD-9-CM: 414.00  1/10/2020 - Present S/P CABG x 3 (Chronic) ICD-10-CM: Z95.1 ICD-9-CM: V45.81  1/10/2020 - Present S/P AVR (Chronic) ICD-10-CM: Z95.2 ICD-9-CM: V43.3  1/10/2020 - Present Bilateral carotid artery stenosis (Chronic) ICD-10-CM: Y66.65 ICD-9-CM: 433.10, 433.30  11/24/2019 - Present Overview Signed 11/24/2019  8:06 PM by Modesto Hudson MD  
  1. Carotid Duplex (11/20/19): Bilateral 50-69% ICA stenosis. 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 
ICD-9-CM: 250.40, 583.81  8/26/2019 - Present  Obesity, morbid (HCC) (Chronic) ICD-10-CM: E66.01 
 ICD-9-CM: 278.01  10/19/2018 - Present Nonrheumatic aortic valve stenosis (Chronic) ICD-10-CM: I35.0 ICD-9-CM: 424.1  4/13/2018 - Present Overview Addendum 9/18/2019  6:16 PM by Carlos Cordero MD  
  1. Echo (10/15/18): EF 55-60%. Moderate to severe Aortic Stenosis. MG 31.  PG 52.  DI 0.25. 
2.  Echo (9/11/19):  EF 60-65%. Mild LAE. SEvere AS. MG 40. PG 61. DI=0.23. Mild to moderate MR. DM type 2 (diabetes mellitus, type 2) (HCC) (Chronic) ICD-10-CM: E11.9 ICD-9-CM: 250.00  1/14/2013 - Present Gout (Chronic) ICD-10-CM: M10.9 ICD-9-CM: 274.9  1/14/2013 - Present BPH (benign prostatic hyperplasia) (Chronic) ICD-10-CM: N40.0 ICD-9-CM: 600.00  1/14/2013 - Present Seasonal allergic rhinitis (Chronic) ICD-10-CM: J30.2 ICD-9-CM: 477.9  1/14/2013 - Present HLD (hyperlipidemia) (Chronic) ICD-10-CM: E97.5 ICD-9-CM: 272.4  1/14/2013 - Present RESOLVED: Acute-on-chronic kidney injury ([facility] ICD-10-CM: N17.9, N18.9 ICD-9-CM: 584.9, 585.9  2/17/2020 - 2/25/2020 RESOLVED: Atelectasis ICD-10-CM: J98.11 ICD-9-CM: 518.0  2/17/2020 - 2/25/2020 RESOLVED: Fluid overload ICD-10-CM: E87.70 ICD-9-CM: 276.69  2/15/2020 - 2/25/2020 RESOLVED: Pleural effusion ICD-10-CM: J90 ICD-9-CM: 511.9  2/3/2020 - 2/25/2020 RESOLVED: Bilateral pneumothorax ICD-10-CM: J93.9 ICD-9-CM: 512.89  1/17/2020 - 2/18/2020 RESOLVED: Thrombocytopenia [facility] ICD-10-CM: D69.6 ICD-9-CM: 287.5  1/17/2020 - 2/18/2020 RESOLVED: Shock ([facility] ICD-10-CM: R57.9 ICD-9-CM: 785.50  1/15/2020 - 2/18/2020 RESOLVED: Metabolic acidosis [facility]-TW: E87.2 ICD-9-CM: 276.2  1/11/2020 - 1/16/2020 RESOLVED: Hyperkalemia ICD-10-CM: E87.5 ICD-9-CM: 276.7  1/11/2020 - 1/16/2020 RESOLVED: Weaning from respirator [facility]) ICD-10-CM: Z99.11 ICD-9-CM: V46.13  1/10/2020 - 2/18/2020  * (Principal) RESOLVED: Acute hypoxemic respiratory failure ([facility] ICD-10-CM: J96.01 
ICD-9-CM: 518.81  1/10/2020 - 2/25/2020 RESOLVED: HTN (hypertension) ICD-10-CM: I10 
ICD-9-CM: 401.9  1/14/2013 - 2/25/2020 Sepsis at the time of admission. Discharge Condition: Stable Hospital Course:  
 
Admission HPI from 2/13/2020: This patient was admitted with the history of shortness of breath hypotension and extreme weakness please refer to the history and physical for more details patient was admitted to ICU He has history of respiratory failure diabetes mellitus coronary artery disease has had aortic valve replacement and bypass surgery about 6 weeks prior to admission here He also has respiratory failure with pneumonia and was treated intensively by the intensivist nephrology Has known history of chronic kidney disease and was acutely worsening and required multiple dialysis sessions. Hospital Course: 
Patient had a prolonged stay in the hospital with multiple medical problems including fluid overload, heart failure, hypotension, pneumonia and sepsis he also has psychiatric issues with depression and suicidal ideation. He is not suicidal now at the time of discharge. He was eventually transferred to floor continued dialysis he had a left thigh wound which is being treated now and overall is better now He breathing is still better his mobility is quite reduced Patient has had psychiatric evaluation and it was felt that the patient does have depression but is not suicidal and involuntary group home was not necessary He does have diabetes mellitus and blood sugars have been erratically controlled and he was also on some steroids which has not been discontinued antibiotics have been taken off He still requires dialysis and being followed by nephrology and he will need further treatment with the Coumadin to be adjusted Request dialysis as well as follow-up with pulmonary as well as cardiology. Consults: Cardiology, Nephrology and Pulmonary/Critical Care Significant Diagnostic Studies: XR chest  
3- IMPRESSION: Left lower lobe atelectasis or pneumonia left pleural effusion Unchanged. Duplex left arm  
3-9-2020 IMPRESSION: 
  
1. No DVT demonstrated in the left arm. 
  
2. Thrombosed superficial branch of the left cephalic vein. Recent Results (from the past 24 hour(s)) GLUCOSE, POC Collection Time: 03/23/20  7:56 PM  
Result Value Ref Range Glucose (POC) 306 (H) 65 - 100 mg/dL METABOLIC PANEL, BASIC Collection Time: 03/24/20  5:20 AM  
Result Value Ref Range Sodium 131 (L) 136 - 145 mmol/L Potassium 4.1 3.5 - 5.1 mmol/L Chloride 97 (L) 98 - 107 mmol/L  
 CO2 25 21 - 32 mmol/L Anion gap 9 7 - 16 mmol/L Glucose 158 (H) 65 - 100 mg/dL BUN 45 (H) 8 - 23 MG/DL Creatinine 5.58 (H) 0.8 - 1.5 MG/DL  
 GFR est AA 13 (L) >60 ml/min/1.73m2 GFR est non-AA 11 (L) >60 ml/min/1.73m2 Calcium 8.4 8.3 - 10.4 MG/DL PROTHROMBIN TIME + INR Collection Time: 03/24/20  5:20 AM  
Result Value Ref Range Prothrombin time 33.5 (H) 12.0 - 14.7 sec INR 3.2 HGB & HCT Collection Time: 03/24/20  5:20 AM  
Result Value Ref Range HGB 9.9 (L) 13.6 - 17.2 g/dL HCT 31.0 (L) 41.1 - 50.3 % GLUCOSE, POC Collection Time: 03/24/20  6:53 AM  
Result Value Ref Range Glucose (POC) 153 (H) 65 - 100 mg/dL GLUCOSE, POC Collection Time: 03/24/20 11:40 AM  
Result Value Ref Range Glucose (POC) 151 (H) 65 - 100 mg/dL Results ** No results found for the last 336 hours. ** METABOLIC PANEL, BASIC [[id number]] (Order [id number]) Lab Date: 3/23/2020 Department: [facility] Released By/Authorizing: Brandy Gaspar NP (auto-released) Component Value Flag Ref Range Units Status Sodium 131  Low   136 - 145 mmol/L Final  
Potassium 4.1   3.5 - 5.1 mmol/L Final  
Chloride 97  Low   98 - 107 mmol/L Final  
CO2 25   21 - 32 mmol/L Final  
 Anion gap 9   7 - 16 mmol/L Final  
Glucose 158  High   65 - 100 mg/dL Final  
Comment:  
47 - 60 mg/dl Consistent with, but not fully diagnostic of hypoglycemia. 101 - 125 mg/dl Impaired fasting glucose/consistent with pre-diabetes mellitus  
> 126 mg/dl Fasting glucose consistent with overt diabetes mellitus BUN 45  High   8 - 23 MG/DL Final  
Creatinine 5.58  High   0.8 - 1.5 MG/DL Final  
GFR est AA 13  Low   >60 ml/min/1.73m2 Final  
GFR est non-AA 11  Low   >60 ml/min/1.73m2 Final  
Comment:  
(NOTE) Estimated GFR is calculated using the Modification of Diet in Renal  
Disease (MDRD) Study equation, reported for both  Americans [facility]) and non- Americans (GFRNA), and normalized to 1.73m2  
body surface area. The physician must decide which value applies to  
the patient. The MDRD study equation should only be used in  
individuals age 25 or older. It has not been validated for the  
following: pregnant women, patients with serious comorbid conditions,  
or on certain medications, or persons with extremes of body size,  
muscle mass, or nutritional status. Calcium 8.4   8.3 - 10.4 MG/DL Final  
 
CBC W/O DIFF [[id number]] (Order [id number]) Lab Date: 3/21/2020 Department: [facility]  Med Surg Released By/Authorizing: Martha Badillo MD (auto-released) Component Value Flag Ref Range Units Status WBC 9.9   4.3 - 11.1 K/uL Final  
RBC 3.76  Low   4.23 - 5.6 M/uL Final  
HGB 10.9  Low   13.6 - 17.2 g/dL Final  
HCT 35.0  Low   41.1 - 50.3 % Final  
MCV 93.1   79.6 - 97.8 FL Final  
MCH 29.0   26.1 - 32.9 PG Final  
MCHC 31.1  Low   31.4 - 35.0 g/dL Final  
RDW 16.2  High   11.9 - 14.6 % Final  
PLATELET 972   496 - 450 K/uL Final  
MPV 10.3   9.4 - 12.3 FL Final  
ABSOLUTE NRBC 0.00   0.0 - 0.2 K/uL Final  
Comment: **Note: Absolute NRBC parameter is now reported with Hemogram** Disposition: [facility]  
 
Discharge Medications:  
Current Discharge Medication List  
  
 START taking these medications Details  
acetaminophen (TYLENOL) 325 mg tablet Take 2 Tabs by mouth every four (4) hours as needed for Pain for up to 3 days. Qty: 12 Tab, Refills: 0  
  
bacitracin (BACITRACIN) 500 unit/gram oint Apply  to affected area two (2) times a day. Qty: 30 g, Refills: 0  
  
benzonatate (TESSALON) 100 mg capsule Take 1 Cap by mouth three (3) times daily as needed for Cough for up to 7 doses. Qty: 21 Cap, Refills: 0  
  
buPROPion (WELLBUTRIN) 75 mg tablet Take 1 Tab by mouth two (2) times a day for 14 days. Qty: 28 Tab, Refills: 0  
  
diphenhydrAMINE (BENADRYL) 25 mg capsule Take 1 Cap by mouth every six (6) hours as needed for Itching for up to 10 days. Qty: 10 Cap, Refills: 0  
  
furosemide (LASIX) 40 mg tablet Take 1 tablet daily AM 
Qty: 14 Tab, Refills: 0  
  
loratadine (CLARITIN) 10 mg tablet Take 1 Tab by mouth daily. Qty: 4 Tab, Refills: 0  
  
midodrine (PROAMITINE) 10 mg tablet Take 1 Tab by mouth three (3) times daily (with meals) for 30 days. Qty: 90 Tab, Refills: 0  
  
polyethylene glycol (MIRALAX) 17 gram packet Take 1 Packet by mouth daily for 7 doses. Qty: 170 g, Refills: 0  
  
sertraline (ZOLOFT) 100 mg tablet Take 1 Tab by mouth daily for 30 days. Qty: 30 Tab, Refills: 0  
  
tamsulosin (FLOMAX) 0.4 mg capsule Take 1 Cap by mouth nightly for 30 days. Qty: 30 Cap, Refills: 0 CONTINUE these medications which have CHANGED Details  
insulin glargine (LANTUS) 100 unit/mL injection Per sliding scale Qty: 1 Vial, Refills: 0 CONTINUE these medications which have NOT CHANGED Details  
warfarin (COUMADIN) 2.5 mg tablet Take 1 Tab by mouth every evening. Qty: 30 Tab, Refills: 1  
  
busPIRone (BUSPAR) 10 mg tablet Take 1 Tab by mouth three (3) times daily. Qty: 270 Tab, Refills: 3 Associated Diagnoses: Anxiety  
  
allopurinol (ZYLOPRIM) 300 mg tablet Take 1 Tab by mouth daily. 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  
  
coenzyme q10-vitamin e (COQ10 ) 100-100 mg-unit cap Take 300 mg by mouth daily. ascorbic acid (VITAMIN C) 500 mg tablet Take 1,000 mg by mouth daily. multivitamins-minerals-lutein (CENTRUM SILVER) Tab Take 1 tablet by mouth daily. STOP taking these medications  
  
 levoFLOXacin (LEVAQUIN) 500 mg tablet Comments:  
Reason for Stopping:   
   
 mirtazapine (REMERON) 15 mg tablet Comments:  
Reason for Stopping: ALPRAZolam (XANAX) 0.25 mg tablet Comments:  
Reason for Stopping:   
   
 loperamide (IMODIUM) 2 mg capsule Comments:  
Reason for Stopping:   
   
 traMADol (ULTRAM) 50 mg tablet Comments:  
Reason for Stopping:   
   
 albuterol (PROVENTIL HFA) 90 mcg/actuation inhaler Comments:  
Reason for Stopping:   
   
 lutein 20 mg tab Comments:  
Reason for Stopping:   
   
  
 
 
Activity: Activity as tolerated Diet: Diabetic Diet Wound Care: Keep wound clean and dry Follow-up Appointments Procedures  FOLLOW UP VISIT Appointment in: Two Weeks Follow up with Pulmonology Follow up with Pulmonology Standing Status:   Standing Number of Occurrences:   1 Order Specific Question:   Appointment in Answer: Two Weeks  FOLLOW UP VISIT Appointment in: Two Weeks Cardiology Cardiology Standing Status:   Standing Number of Occurrences:   1 Order Specific Question:   Appointment in Answer: Two Weeks  FOLLOW UP VISIT Appointment in: Other (Specify) Needs dialysis as per nephrology instructions as  Outpatient As per nephrology Needs dialysis as per nephrology instructions as  Outpatient As per nephrology Standing Status:   Standing Number of Occurrences:   1 Order Specific Question:   Appointment in Answer: Other (Specify) I have discussed the plan of care with patient. Time spent on discharge is 38 minutes. Signed By: Gerald Parkinson MD   
 March 24, 2020

## 2020-03-24 NOTE — PROGRESS NOTES
Ruba s Admission Date: 2/13/2020 Daily Progress Note: 3/24/2020 The patient's chart is reviewed and the patient is discussed with the staff. 68 yo Florentino Portillo a history of CAD s/p CABG with AVR 1/10/20 by Dr. Jose J Fragoso complicated by CKD requiring HD, HIT +, DVTs, wound left thigh and pneumonia.  Was discharged on Levaquin Q 48 hours last dose 2/11/20. Stevie Burciaga was discharged on NC 2 L but has been increased to NC 4L O2 at [facility]. He presented to the ER on 2/15 with increased SOB. CTA chest was negative for PE but persistent LLL opacity. He had a bronch 2/17 with negative cultures and was treated for PNA using levaquin. He is now ESRD and being followed by nephrology.  
  
  We were reconsulted on 3/6 for acute respiratory failure requiring bipap. CXR shows increased consolidation in the left base. He is febrile with a temp of 100.5. He has refused a swallow evaluation. ID is following and he was started on Vanc and Cefepime on 3/6. US 3/17 with no pleural effusion. Subjective: On  3L O2. Renal following, HD done today Claim he is dizzy but he refuses therapy and say \"please let me die\" His nurse agreed that he does not want to do anything Current Facility-Administered Medications Medication Dose Route Frequency  triamcinolone acetonide (KENALOG) 0.1 % cream   Topical BID  warfarin (COUMADIN) tablet 5 mg  5 mg Oral QPM  
 furosemide (LASIX) tablet 40 mg  40 mg Oral DAILY  alum-mag hydroxide-simeth (MYLANTA) oral suspension 30 mL  30 mL Oral Q4H PRN  
 sertraline (ZOLOFT) tablet 100 mg  100 mg Oral DAILY  buPROPion [facility]) tablet 75 mg  75 mg Oral BID  loratadine (CLARITIN) tablet 10 mg  10 mg Oral DAILY  diphenhydrAMINE (BENADRYL) capsule 25 mg  25 mg Oral Q6H PRN  mirtazapine (REMERON) tablet 15 mg  15 mg Oral QHS  ALPRAZolam (XANAX) tablet 0.5 mg  0.5 mg Oral Q6H PRN  
  bacitracin 500 unit/gram ointment   Topical BID  insulin lispro (HUMALOG) injection   SubCUTAneous AC&HS  
 dextrose 40% (GLUTOSE) oral gel 1 Tube  15 g Oral PRN  
 glucagon (GLUCAGEN) injection 1 mg  1 mg IntraMUSCular PRN  
 dextrose (D50W) injection syrg 12.5-25 g  25-50 mL IntraVENous PRN  
 HYDROcodone-homatropine (HYCODAN) 5-1.5 mg/5 mL (5 mL) syrup 5 mL  5 mL Oral Q4H PRN  
 benzonatate (TESSALON) capsule 100 mg  100 mg Oral TID PRN  
 guaiFENesin ER (MUCINEX) tablet 1,200 mg  1,200 mg Oral Q12H  
 albuterol (PROVENTIL VENTOLIN) nebulizer solution 2.5 mg  2.5 mg Nebulization Q6H RT  
 insulin glargine (LANTUS) injection 5 Units  5 Units SubCUTAneous QHS  sodium chloride (OCEAN) 0.65 % nasal squeeze bottle 2 Spray  2 Spray Both Nostrils Q2H PRN  
 morphine injection 2 mg  2 mg IntraVENous Q6H PRN  
 sodium chloride 3% hypertonic nebulizer soln  4 mL Nebulization BID RT  
 sodium chloride (OCEAN) 0.65 % nasal squeeze bottle 2 Spray  2 Spray Both Nostrils BID  epoetin maritza-epbx (RETACRIT) 14,000 Units combo injection  14,000 Units SubCUTAneous Q7D  
 polyethylene glycol (MIRALAX) packet 17 g  17 g Oral DAILY  midodrine (PROAMITINE) tablet 10 mg  10 mg Oral TID WITH MEALS  tamsulosin (FLOMAX) capsule 0.4 mg  0.4 mg Oral QHS  loperamide (IMODIUM) capsule 4 mg  4 mg Oral QID PRN  pantothenic ac-min oil-pet,hyd (AQUAPHOR) 41 % ointment   Topical DAILY  traMADol (ULTRAM) tablet 50 mg  50 mg Oral Q6H PRN  
 sodium chloride (NS) flush 5-40 mL  5-40 mL IntraVENous PRN  
 acetaminophen (TYLENOL) tablet 650 mg  650 mg Oral Q4H PRN  
 naloxone (NARCAN) injection 0.4 mg  0.4 mg IntraVENous PRN  
 ondansetron (ZOFRAN) injection 4 mg  4 mg IntraVENous Q4H PRN  
 bisacodyL (DULCOLAX) tablet 5 mg  5 mg Oral DAILY PRN Review of Systems Constitutional: negative for fever, chills, sweats Cardiovascular: negative for chest pain, palpitations, syncope, edema Gastrointestinal: negative for dysphagia, reflux, vomiting, diarrhea, abdominal pain, or melena Neurologic: + generalized weakness. negative for focal weakness, numbness, headache Objective:  
 
Vitals:  
 03/24/20 0958 03/24/20 1001 03/24/20 1016 03/24/20 1138 BP: 123/74 106/70 100/64 123/73 Pulse: 89 89 86 92 Resp:    18 Temp:    97.7 °F (36.5 °C) SpO2:    96% Weight:      
Height:      
 
Intake and Output:  
03/22 1901 - 03/24 0700 In: -  
Out: 250 [Urine:250] 03/24 0701 - 03/24 1900 In: -  
Out: 1900 Physical Exam:  
Constitution:  the patient is well developed and in no acute distress EENMT:  Sclera clear, pupils equal, oral mucosa moist 
Respiratory: CTA B in anterior lung fields. Decreased at B bases L>R Cardiovascular:  RRR without M,G,R 
Gastrointestinal: soft and non-tender; with positive bowel sounds. Musculoskeletal: warm without cyanosis. There is no lower leg edema. Skin:  no jaundice or rashes, no wounds Neurologic: no gross neuro deficits Psychiatric:  alert and oriented x ppt CHEST XRAY:  
 
3/20/20 1. Persistent left lung base opacity, likely combination of small effusion and 
airspace disease (atelectasis or pneumonia). 2. No significant change compared to prior exam. 
 
 
3/19/20 1. Persistent left lung base opacity, likely atelectasis or pneumonia. LAB No lab exists for component: [place] Recent Labs  
  03/24/20 
0520 03/23/20 
0536 03/22/20 
8917 WBC  --   --  9.9 HGB 9.9*  --  10.9* HCT 31.0*  --  35.0*  
PLT  --   --  255 INR 3.2 4.1* 4.2* Recent Labs  
  03/24/20 
0520 03/23/20 
0536 03/22/20 
3176 * 132* 135* K 4.1 4.1 4.2 CL 97* 99 100 CO2 25 27 27 * 160* 171* BUN 45* 34* 22  
CREA 5.58* 5.19* 4.05* CA 8.4 8.2* 8.8 ABG:  No results found for: PH, PHI, PCO2, PCO2I, PO2, PO2I, HCO3, HCO3I, FIO2, FIO2I Assessment:  (Medical Decision Making) Hospital Problems  Date Reviewed: 3/9/2020 Codes Class Noted POA Acute on chronic respiratory failure with hypoxia [facility]) ICD-10-CM: J96.21 
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 
ICD-9-CM: 250.40, 583.81  8/26/2019 Yes Obesity, morbid ([address] (Chronic) ICD-10-CM: E66.01 
ICD-9-CM: 278.01  10/19/2018 Yes DM type 2 (diabetes mellitus, type 2) (HCC) (Chronic) ICD-10-CM: E11.9 ICD-9-CM: 250.00  1/14/2013 Yes HLD (hyperlipidemia) (Chronic) ICD-10-CM: U61.1 ICD-9-CM: 272.4  1/14/2013 Yes Pt with recurrent respiratory failure. Feel like much of this is due to atelectasis/mucus plugging related to generalized weakness and debility. Does not appear to be related to pleural effusion. May be component of pulmonary edema with renal failure. TTE unimpressive. O2 needs are gradually improving back towards his admission O2 levels. Plan:  (Medical Decision Making)  
 
-cont efforts to minimize atelectasis including vibralung, hypertonic saline nebs, and IS.  
-wean o2  
-Continue albuterol.  
- HD per nephrology -This patient is stable from a pulmonary standpoint. We have nothing to add to management at this time. Please call if we need to see again.  
 
 
Deborah Earl MD

## 2020-03-24 NOTE — PROGRESS NOTES
[facility] PROGRESS NOTE Follow up for: DEJUAN/CKD Subjective:  
Patient seen and examined on HD, dialyzing via left  Qb, UF 2500, tolerating UF, comfortable no new complaints. Chart and labs reviewed-transfer to 4th floor today ROS: 
Gen - no fever, no chills, appetite okay CV - no chest pain, no orthopnea Lung - + exertional shortness of breath- better, no cough GI- no N/V/D Ext/Access - no edema Objective:  
Exam: 
Vitals:  
 03/24/20 6395 03/24/20 0726 03/24/20 0800 03/24/20 8616 BP: 131/77 119/69 125/74 97/65 Pulse: 84 80 79 82 Resp: 18 Temp: 98.3 °F (36.8 °C) SpO2: 94% Weight:      
Height:      
 
 
 
Intake/Output Summary (Last 24 hours) at 3/24/2020 7493 Last data filed at 3/23/2020 2150 Gross per 24 hour Intake  Output 100 ml Net -100 ml Current Facility-Administered Medications Medication Dose Route Frequency  triamcinolone acetonide (KENALOG) 0.1 % cream   Topical BID  Warfarin on Hold   Other Rx Dosing/Monitoring  furosemide (LASIX) tablet 40 mg  40 mg Oral DAILY  alum-mag hydroxide-simeth (MYLANTA) oral suspension 30 mL  30 mL Oral Q4H PRN  
 sertraline (ZOLOFT) tablet 100 mg  100 mg Oral DAILY  buPROPion [facility]) tablet 75 mg  75 mg Oral BID  loratadine (CLARITIN) tablet 10 mg  10 mg Oral DAILY  diphenhydrAMINE (BENADRYL) capsule 25 mg  25 mg Oral Q6H PRN  mirtazapine (REMERON) tablet 15 mg  15 mg Oral QHS  ALPRAZolam (XANAX) tablet 0.5 mg  0.5 mg Oral Q6H PRN  
 bacitracin 500 unit/gram ointment   Topical BID  insulin lispro (HUMALOG) injection   SubCUTAneous AC&HS  
 dextrose 40% (GLUTOSE) oral gel 1 Tube  15 g Oral PRN  
 glucagon (GLUCAGEN) injection 1 mg  1 mg IntraMUSCular PRN  
 dextrose (D50W) injection syrg 12.5-25 g  25-50 mL IntraVENous PRN  
 HYDROcodone-homatropine (HYCODAN) 5-1.5 mg/5 mL (5 mL) syrup 5 mL  5 mL Oral Q4H PRN  
  benzonatate (TESSALON) capsule 100 mg  100 mg Oral TID PRN  
 guaiFENesin ER (MUCINEX) tablet 1,200 mg  1,200 mg Oral Q12H  
 albuterol (PROVENTIL VENTOLIN) nebulizer solution 2.5 mg  2.5 mg Nebulization Q6H RT  
 insulin glargine (LANTUS) injection 5 Units  5 Units SubCUTAneous QHS  sodium chloride (OCEAN) 0.65 % nasal squeeze bottle 2 Spray  2 Spray Both Nostrils Q2H PRN  
 morphine injection 2 mg  2 mg IntraVENous Q6H PRN  
 sodium chloride 3% hypertonic nebulizer soln  4 mL Nebulization BID RT  
 sodium chloride (OCEAN) 0.65 % nasal squeeze bottle 2 Spray  2 Spray Both Nostrils BID  epoetin maritza-epbx (RETACRIT) 14,000 Units combo injection  14,000 Units SubCUTAneous Q7D  
 polyethylene glycol (MIRALAX) packet 17 g  17 g Oral DAILY  midodrine (PROAMITINE) tablet 10 mg  10 mg Oral TID WITH MEALS  tamsulosin (FLOMAX) capsule 0.4 mg  0.4 mg Oral QHS  loperamide (IMODIUM) capsule 4 mg  4 mg Oral QID PRN  pantothenic ac-min oil-pet,hyd (AQUAPHOR) 41 % ointment   Topical DAILY  traMADol (ULTRAM) tablet 50 mg  50 mg Oral Q6H PRN  
 sodium chloride (NS) flush 5-40 mL  5-40 mL IntraVENous PRN  
 acetaminophen (TYLENOL) tablet 650 mg  650 mg Oral Q4H PRN  
 naloxone (NARCAN) injection 0.4 mg  0.4 mg IntraVENous PRN  
 ondansetron (ZOFRAN) injection 4 mg  4 mg IntraVENous Q4H PRN  
 bisacodyL (DULCOLAX) tablet 5 mg  5 mg Oral DAILY PRN  
 
 
EXAM 
GEN - Alert, oriented, in no distress CV - regular rate, S1, S2, no Rub Lung - mostly clear, left base diminished, no wheezes Abd - soft, nontender, BS present Ext/ Access - no edema, Left TCC- intact Recent Labs  
  03/24/20 
0520 03/22/20 
3611 WBC  --  9.9 HGB 9.9* 10.9* HCT 31.0* 35.0*  
PLT  --  255 Recent Labs  
  03/24/20 
0520 03/23/20 
0536 03/22/20 
8763 * 132* 135* K 4.1 4.1 4.2 CL 97* 99 100 CO2 25 27 27 BUN 45* 34* 22  
CREA 5.58* 5.19* 4.05* CA 8.4 8.2* 8.8  
* 160* 171* Assessment and Plan:  
 
1) DEJUAN on CKD-  No sign of recovery. HD dependent since Jan, '20- now ESRD 
-seen on HD, tolerating UF, catheter functioning well 2) Hypotension-  On midodrine prior to dialysis 3)  Hypoxic respiratory failure- on 3L O2, cxr low lung volumes with airspace consolidation left mid and lower lung. 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

## 2020-03-24 NOTE — PROGRESS NOTES
Warfarin dosing per pharmacist 
 
Mayco Leslie is a 69 y.o. male. Height: 5' 10\" (177.8 cm)   Weight 103.5 kg (228 lbs) Indication:  AVR and afib Goal INR:  2.5 - 3.5 Home dose:  2.5 mg daily Risk factors or significant drug interactions: history of HIT(SHARON positive on 1/18/20) Other anticoagulants: None Daily Monitoring Date  INR     Warfarin dose HGB              Notes 2/14  2.4  3 mg  8.7 2/15  2.2  4 mg  9.6 2/16  2.1  5 mg  9.8 2/17  2.1  5 mg  9.5 2/18  2.8  2 mg  9.3 2/19  2.5  2 mg  8.7 
2/20  1.9  3 mg   8.8 
2/21  1.9  4 mg  8.6 
2/22  2.1  3 mg  8.5 
2/23  1.9  4 mg  --- 
2/24  1.7  3 mg + 2 mg --- 
2/25  1.7  5 mg  --- 
2/26  1.6  6 mg  --- 
2/27  1.7  6 mg   --- 
2/28  1.7  7.5 mg  --- 
2/29  2.1 ` 6 mg  7.7 
3/1  2.0  7.5 mg  --- 
3/2  2.9  6 mg  --- 
3/3  3.4  5 mg  8.0 
3/4  2.7  7.5 mg  8.6 
3/5  2.9  6 mg  8.0 
3/6  3.1  Held  8.4 
3/7  2.6  6 mg  8.0 
3/8  2.7  6 mg  --- 
3/9  2.5  6 mg  --- 
3/10  2.7  6 mg  7.6 
3/11  3.3  5 mg  8.3 
3/12  4.3  Hold  8.2 
3/13  3.9  Hold  8.8 
3/14  3.5  Hold  --- 
3/15  2.8  Hold  --- 
3/16  2.5  Hold  --- 
3/17  3.1  6 mg  --- On argatroban 3/18  3.0  6 mg  10.3 On argatroban 3/19  4.1  5 mg  9.8 On argatroban 3/19  2.9  5 mg  9.8 Off argatroban for 2 hours 3/20  2.5  6 mg  10.2 
3/21  3.7  Hold  --- 
3/22  4.2  Hold  10.9 
3/23  4.1  Hold  ---  
3/24  3.2  5 mg  9.9 Pulmonary performed thoracentesis on 3/17. Patient was bridged with argatroban. INR trending further down, now within therapeutic range at 3.2. Will give 5 mg this evening. Noted patient approved to discharge to [facility]. Pharmacy staff at [facility] can continue to follow warfarin therapy once discharged. Pharmacy will continue to follow patient and monitor INR daily. Thank you, 
Sweetie Cole, PharmD, [facility] Clinical Pharmacist 
[phone]

## 2020-03-24 NOTE — ADT AUTH CERT NOTES
Respiratory Failure GRG - Care Day 40 (3/23/2020) by Felix Osullivan RN  
 
   
Review Status Review Entered Completed 3/23/2020 17:24  
   
Criteria Review Care Day: 40 Care Date: 3/23/2020 Level of Care: Telemetry Guideline Day 3 Level Of Care ( ) * Activity level acceptable ( ) Floor to discharge 3/23/2020 17:24:36 EDT by Paradise Montemayor   
  remote telemetry Clinical Status   
(X) * Ventilation status appropriate   
(X) * Airway status acceptable   
(X) * Respiratory status acceptable 3/23/2020 17:24:36 EDT by Paradise Montemayor   
  RR 18   
( ) * Stable chest findings ( ) * Neurologic status acceptable   
(X) * Temperature status acceptable 3/23/2020 17:24:36 EDT by Colette Singh 97.5   
( ) * No infection, or status acceptable ( ) * General Discharge Criteria met Interventions   
(X) * No chest tube, or status acceptable ( ) * Intake acceptable ( ) * No inpatient interventions needed * Milestone Additional Notes 3/23/20 Vitals: 97.5, 89, 18, 105/58, 94% 3.5L NC Meds: albuterol neb Q6h, wellbutrin 75mg PO BID, benadryl 25mg PO X2, lasix 40mg PO daily, mucinex 1200mg PO Q12h, claritin 10mg PO daily, proamitine 10mg PO TID, remeron 15mg PO bedtime, zoloft 100mg PO daily, 3% hypertonic neb BID, flomax 0.4mg PO bedtime, Labs: INR 4.1, PT 40.9, Na 132, anion gap 6, glucose 160, BUN 34, Cr 5.19, gfr 12 CXR: Left lower lobe atelectasis or pneumonia and left pleural effusion  
unchanged. Plan: AM labs, diabetic diet, IS, I&O, OT  
  
Pulmonary: On  3L O2. Renal following Feels about the same.  
  
-cont efforts to minimize atelectasis including vibralung, hypertonic saline nebs, and IS.   
-cont to wean o2   
-Continue albuterol.   
- HD per nephrology -s/p abx. Nephrology:  
1) DEJUAN on CKD-  No sign of recovery.  HD dependent since Jan, '20- likely ESRD  
 -next HD tomorrow AM for volume and clearance and then again on Wed if he transfers to [facility]- pending admission to 4th floor per CW 2) Hypotension-  On midodrine prior to dialysis 3)  Hypoxic respiratory failure- on 3L O2, cxr low lung volumes with airspace consolidation left mid and lower lung. 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  
   
Respiratory Failure GRG - Care Day 37 (3/20/2020) by Deyanira Manley RN  
 
   
Review Status Review Entered Completed 3/20/2020 15:23  
   
Criteria Review Care Day: 37 Care Date: 3/20/2020 Level of Care: Inpatient Floor Guideline Day 3 Level Of Care ( ) * Activity level acceptable   
(X) Floor to discharge 3/20/2020 15:23:40 EDT by Annette Soto   
  [facility] Clinical Status ( ) * Ventilation status appropriate ( ) * Airway status acceptable ( ) * Respiratory status acceptable ( ) * Stable chest findings ( ) * Neurologic status acceptable ( ) * Temperature status acceptable ( ) * No infection, or status acceptable ( ) * General Discharge Criteria met Interventions ( ) * No chest tube, or status acceptable ( ) * Intake acceptable ( ) * No inpatient interventions needed * Milestone Additional Notes Pt with recurrent respiratory failure. Feel like much of this is due to atelectasis/mucus plugging related to generalized weakness and debility. Does not appear to be related to pleural effusion. May be component of pulmonary edema with renal failure. CXR somewhat suggestive of pulmonary edema. With orthostatic hypotension feel like repeating his echo to check his current cardiac function is warranted.   
   
   
Plan:  (Medical Decision Making) -repeat TTE now.   
-cont efforts to minimize atelectasis including vibralung, hypertonic saline nebs, and will add IS.   
-cont to wean o2 as sats allow. Continue albuterol.   
-cont lasix. -wonder if patient needs further workup for other causes of orthostatic hypotension such as parkinson's, etc.  
-restart argatroban vs coumadin. No additional procedures planned from pulmonary at this time.   
   
Patient tells me his breathing is doing better. Now down to 3L O2. No new issues.   
 Vitals:  
  03/20/20 9026 03/20/20 6007 03/20/20 0750 03/20/20 4079 BP:     160/75    
Pulse:     78    
Resp:     19    
Temp:     97.5 °F (36.4 °C)    
SpO2: 92% 98% 94%    
Weight:       239 lb 12.8 oz (108.8 kg) Height:          
  3 L 02 Intake and Output:   
03/18 1901 - 03/20 0700 In: 480 [P.O.:480] Out: 2700 [Urine:200] No intake/output data recorded.  
   
Physical Exam:   
Constitution:  the patient is well developed and in no acute distress EENMT:  Sclera clear, pupils equal, oral mucosa moist  
Respiratory: CTA B in anterior lung fields. Decreased at B bases L>R Cardiovascular:  RRR without M,G,R  
Gastrointestinal: soft and non-tender; with positive bowel sounds. Musculoskeletal: warm without cyanosis. There is no lower leg edema. Skin:  no jaundice or rashes, no wounds Neurologic: no gross neuro deficits     
Psychiatric:  alert and oriented x ppt  
   
CHEST XRAY:   
3/20/20 1. Persistent left lung base opacity, likely combination of small effusion and  
airspace disease (atelectasis or pneumonia). 2. No significant change compared to prior exam.  
  
Medications,  
albuterol (PROVENTIL VENTOLIN) nebulizer solution 2.5 mg   
Dose: 2.5 mg  
Freq: EVERY 6 HOURS RESP Route: NEBULIZATION Start: 03/09/20 1400  
 Order specific questions:  
  
ALPRAZolam Yennifer Broaden) tablet 0.5 mg   
Dose: 0.5 mg  
Freq: EVERY 6 HOURS AS NEEDED Route: PO  
PRN Reason: Anxiety  
  
insulin lispro (HUMALOG) injection Freq: 4 TIMES DAILY BEFORE MEALS & NIGHTLY Route: SC  
Start: 03/11/20 1130  
 Admin Instructions:  
INITIATE INSULIN CORRECTIVE PROTOCOL:  
  
sodium chloride 3% hypertonic nebulizer soln Dose: 4 mL Freq: 2 TIMES DAILY RESP Route: NEBULIZATION Start: 03/06/20 2000  
-sodium chloride 3% hypertonic nebulizer soln Dose: 4 mL Freq: 2 TIMES DAILY RESP Route: NEBULIZATION Start: 03/06/20 2000  
  
____________________________________________________________________________________________ Outcome: Progressing Towards Goal  
   
  
PHYSICAL THERAPY: Daily Note and PM 3/20/2020 INPATIENT: PT Visit Days : 4 Payor: Allyn Blackwell / Plan: Tony Chua / Product Type: [employer] [place] [employer] /    
   
NAME/AGE/GENDER: Emily Fragoso is a 69 y.o. male              
PRIMARY DIAGNOSIS: Acute respiratory failure ([facility] [address] [J96.00] Fluid overload [E87.70] Acute hypoxemic respiratory failure (HCC) Acute hypoxemic respiratory failure (HCC) Procedure(s) (LRB): ULTRASOUND (Bilateral) 3 Days Post-Op ICD-10: Treatment Diagnosis:   
  
  · Generalized Muscle Weakness (M62.81) · Difficulty in walking, Not elsewhere classified (R26.2) Precaution/Allergies:  
Heparin; Amoxicillin; Keflex [cephalexin]; and Pcn [penicillins]   
   
ASSESSMENT:  
   
Mr. Mario Segovia presents in supine without specific complaints; he is agreeable to therapy treatment. Impulsive throughout session and needs frequent redirection to stay on task and follow cues. Worked on bed mobility, seated and standing static/dynamic balance at edge of bed, in chair, and in standing. Practiced transfers x many reps with cueing for hand/foot placement and technique. Pt stood at most for 25 seconds with reminders to work on upright posture and head/neck positioning and attention to task, keeping eyes open during static/dynamic activities. Several short gait trials in room using walker and with chair follow for safety due to often needing to sit quickly.  Worked on toilet transfers, standing functional activities at sink to address strength, balance, and activity tolerance. Pt fatigues quickly each time in standing and needs many rest breaks + encouragement. Pt performs unsupported seated tasks including reaching outside base of support to address trunk control, posture, and balance. C/o feeling that Lindie Severs are crossing whenever I stand up\" and does report some blurry/double vision. May benefit from vestibular assessment. Pt up to chair after activity with needs in reach. Overall showing slow progress; was able to increase standing duration, reps, and overall therapy session length with many short transfer trials and gait trials as well as standing activities. Will continue with therapy efforts per plan of care. Needs rehab at [place].  
   
At this time, patient is appropriate for Co-treatment with occupational therapy due to patient's decreased overall endurance/tolerance levels, as well as need for high level skilled assistance to complete functional transfers/mobility and functional tasks. Jovani Hess is appropriate for a multidisciplinary co-treatment of PT and OT to address goals of both disciplines.   
   
   
  
This section established at most recent assessment PROBLEM LIST (Impairments causing functional limitations): 1. Decreased Strength 2. Decreased ADL/Functional Activities 3. Decreased Transfer Abilities 4. Decreased Ambulation Ability/Technique 5. Decreased Balance 6. Decreased Activity Tolerance 7. Increased Fatigue 8. Increased Shortness of Breath 9. Edema/Girth  
  INTERVENTIONS PLANNED: (Benefits and precautions of physical therapy have been discussed with the patient.) 1. Balance Exercise 2. Bed Mobility 3. Family Education 4. Gait Training 5. Home Exercise Program (HEP) 6. Neuromuscular Re-education/Strengthening 7. Therapeutic Activites 8. Therapeutic Exercise/Strengthening 9. Transfer Training  
   
TREATMENT PLAN: Frequency/Duration: 3 times a week for duration of hospital stay Rehabilitation Potential For Stated Goals: Good  
   
REHAB RECOMMENDATIONS (at time of discharge pending progress):    
Placement: It is my opinion, based on this patient's performance to date, that Mr. Manjit Rodriguez may benefit from intensive therapy at a [address] after discharge due to the functional deficits listed above that are likely to improve with skilled rehabilitation and concerns that he/she may be unsafe to be unsupervised at home due to a fall risk, safety concerns for transfers and ambulation at home. Equipment: · To be determined   
       
   
  
   
  
HISTORY:  
History of Present Injury/Illness (Reason for Referral): Mr. 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  Was DC 2/12/20 from CABG and aortic valve replacement complicated by CKD requiring HD, HIT +, DVTs, wound left thigh on wound vac, pneumonia DC on levaquin Q 48 hours last dose 2/11/20. [facility] was DC on 2 L O2 however has been increased to 4L O2 via NC at [employer] yesterday when he arrived. Mello he was at HD and reports dizziness with sitting up and acute onset of SOB.  Son at bedside and states pt was doing ok since DC yesterday until today at HD.  INR 2.4.  CXR showed improving interstitial edema, unchanged left basilar opacity and left pleural effusion.  Pt given lasix in ED.  Pt is SOB when talking in short sentences.  Denies CP, n/v.  Has had diarrhea however is not new since hospitalization.     
   
Past Medical History/Comorbidities:   
Mr. Joe Hurtado a past medical history of Arthritis, BPH (benign prostatic hyperplasia) (1/14/2013), CAD (coronary artery disease), DM type 2 (diabetes mellitus, type 2) ([facility] [address] (dx 2004), Dyspnea, Gout (1/14/2013), HLD (hyperlipidemia) (1/14/2013), HTN (hypertension), Morbid obesity ([facility] [address] (9/3/14), Psychiatric disorder, Rheumatic fever, Seasonal allergic rhinitis, Severe aortic valve stenosis, Thyroid disease, and Unspecified sleep apnea (2016).  Mr. Joe Hurtado a past surgical history that includes hx tonsil and adenoidectomy; hx heart catheterization (12/23/2019); hx orthopaedic (Left); hx cataract removal (Bilateral, 2012); and ir insert tunl cvc w port over 5 years (2/4/2020). Social History/Living Environment:   
Home Environment: Private residence # Steps to Enter: 2 Rails to Enter: No  
One/Two Story Residence: One story Living Alone: No  
Support Systems: Spouse/Significant Other/Partner Patient Expects to be Discharged to[de-identified] Rehabilitation facility Current DME Used/Available at Home: Cane, straight, Walker, rolling Tub or Shower Type: Shower Prior Level of Function/Work/Activity:  
Lives with spouse, admit from rehab; has been using RW short distance ambulation, assist ADLs Personal Factors:    
      Sex:  male  
      Age:  66 y.o.  
      Overall Behavior:  agreeable Number of Personal Factors/Comorbidities that affect the Plan of Care:  
CAD, DM, DVTs, age 3+: HIGH COMPLEXITY EXAMINATION:  
Most Recent Physical Functioning:  
Gross Assessment:  
AROM: Within functional limits Strength: Generally decreased, functional  
Coordination: Within functional limits Posture:  
Posture (WDL): Exceptions to [facility] Posture Assessment: Forward head, Rounded shoulders, Trunk flexion Balance:  
Sitting: Impaired Sitting - Static: Good (unsupported) Sitting - Dynamic: Fair (occasional) Standing: Impaired Standing - Static: Fair Standing - Dynamic : Fair Bed Mobility:  
Supine to Sit: Minimum assistance Scooting: Minimum assistance Wheelchair Mobility:  
Transfers:  
Sit to Stand: Minimum assistance Stand to Sit: Minimum assistance Bed to Chair: Minimum assistance Interventions: Verbal cues; Visual cues; Safety awareness training; Tactile cues;Manual cues Duration: 57 Minutes Gait:  
Base of Support: Widened;Center of gravity altered Speed/Federica: Pace decreased (<100 feet/min) Step Length: Left shortened;Right shortened Gait Abnormalities: Trunk sway increased;Decreased step clearance;Shuffling gait Distance (ft): 6 Feet (ft)(many short gait trials/reps) Assistive Device: Walker, rolling Ambulation - Level of Assistance: Contact guard assistance;Minimal assistance Interventions: Verbal cues; Safety awareness training; Tactile cues;Manual cues Body Structures Involved: 1. Heart 2. Lungs 3. Muscles Body Functions Affected: 1. Cardio 2. Respiratory 3. Movement Related Activities and Participation Affected: 1. General Tasks and Demands 2. Mobility 3. Self Care Number of elements that affect the Plan of Care: 4+: HIGH COMPLEXITY CLINICAL PRESENTATION:  
Presentation: Evolving clinical presentation with changing clinical characteristics: MODERATE COMPLEXITY CLINICAL DECISION MAKIN[address] [place] [id number] AM-PAC \"6 Clicks\" Basic Mobility Inpatient Short Form How much difficulty does the patient currently have. .. Unable A Lot A Little None 1.  Turning over in bed (including adjusting bedclothes, sheets and blankets)?  ? 1  ? 2  ? 3  ? 4  
2.  Sitting down on and standing up from a chair with arms ( e.g., wheelchair, bedside commode, etc.)  ? 1  ? 2  ? 3  ? 4  
3.  Moving from lying on back to sitting on the side of the bed?  ? 1  ? 2  ? 3  ? 4 How much help from another person does the patient currently need. .. Total A Lot A Little None 4.  Moving to and from a bed to a chair (including a wheelchair)?  ? 1  ? 2  ? 3  ? 4  
5.  Need to walk in hospital room?  ? 1  ? 2  ? 3  ? 4  
6.  Climbing 3-5 steps with a railing?  ? 1  ? 2  ? 3  ? 4  
© 2007, Trustees of [address] [place] [id number], under license to [employer].  All rights reserved   
   
  
Score: Initial: 17 Most Recent: 15 (Date: 3/13/20 )    
 Interpretation of Tool:  Represents activities that are increasingly more difficult (i.e. Bed mobility, Transfers, Gait).  
   
Medical Necessity:     
· Patient is expected to demonstrate progress in   
· strength, range of motion, balance, and coordination · to   
· decrease assistance required with overall functional mobility, transfers, ambulation · . · Patient demonstrates · good · rehab potential due to higher previous functional level. Reason for Services/Other Comments:  
· Patient · continues to require present interventions due to patient's inability to perform bed mobility, transfers, ambulation safely and effectively · . Use of outcome tool(s) and clinical judgement create a POC that gives a: Clear prediction of patient's progress: LOW COMPLEXITY  
  
   
   
   
  
TREATMENT:   
(In addition to Assessment/Re-Assessment sessions the following treatments were rendered) Pre-treatment Symptoms/Complaints:  \"you wanna walk? \"  
  
Pain: Initial:   
Pain Intensity 1: 0 Post Session: 0/10  
   
   
Therapeutic Activity: (57 minutes ):  Therapeutic activities including Bed mobility, seated static/dynamic activities, sit-stand transfer training x many reps, standing static and dynamic mobility and functional activities, toilet transfers, gait training x many reps for short distance and Chair transfers to improve mobility, strength, balance, coordination and activity tolerance.  Required minimal Verbal cues; Safety awareness training; Tactile cues;Manual cues to promote static and dynamic balance in standing and promote motor control of bilateral, lower extremity(s).   
   
Today's treatment session addressed Decreased Strength, Decreased ADL/Functional Activities, Decreased Ambulation Ability/Technique, Decreased Activity Tolerance and fluctuations in BP to progress towards achieving goal(s) 1, 2 and 3.  During this session, Occupational Therapy addressed ADLs to progress towards their discipline specific goal(s).  Co-treatment was necessary to improve patient's ability to increase activity demands and ability to return to normal functional activity.  
   
  
  Date:  
3/13/20 Date:  
  Date:  
   
Activity/Exercise Parameters Parameters Parameters LAQ 15x AB      
Seated marching 15x AB      
Ankle pumps 15x AB      
Seated hip aBd        
         
         
         
A=active, B=bilateral  
   
Braces/Orthotics/Lines/Etc:   
· Nasal cannula Treatment/Session Assessment:    
· Response to Treatment:   Slow progress · Interdisciplinary Collaboration:   
? Physical Therapist  
? Occupational Therapist  
? Registered Nurse · After treatment position/precautions:   
? Up in chair ? Bed/Chair-wheels locked ? Bed in low position ? Call light within reach ? RN notified · Compliance with Program/Exercises: Compliant all of the time Recommendations/Intent for next treatment session:  \"Next visit will focus on advancements to more challenging activities\" as tolerated Total Treatment Duration: PT Patient Time In/Time Out Time In: 5124 Time Out: 26  
   
Alok Bradley, DPT   
       
   
  
  
  
   
Pulmonary Note 3/17/20 by Danika Huerta, OLYA  
 
   
Review Status Review Entered In Primary 3/20/2020 15:20  
   
Criteria Review  
  
70 yo Joane Apley a history of CAD s/p CABG with AVR 1/10/20 by Dr. Mikayla Vargas complicated by CKD requiring HD, HIT +, DVTs, wound left thigh and pneumonia.  Was discharged on Levaquin Q 48 hours last dose 2/11/20. [facility] was discharged on NC 2 L but has been increased to NC 4L O2 at [facility]. He presented to the ER on 2/15 with increased SOB. CTA chest was negative for PE but persistent LLL opacity.  He had a bronch 2/17 with negative cultures and was treated for PNA using levaquin. He is now ESRD and being followed by nephrology.  
  
  We were reconsulted on 3/6 for acute respiratory failure requiring bipap. CXR shows increased consolidation in the left base. He is febrile with a temp of 100.5. He has refused a swallow evaluation. ID is following and he was started on Vanc and Cefepime on 3/6.  
  
Subjective:  
  
Patient currently on 5L O2. US performed of left chest today without any visible pleural effusion. Had HD today. Ongoing weakness and notable orthostatic hypotension 
  
           
Vitals:  
  03/17/20 1232 03/17/20 1432 03/17/20 1508 03/17/20 1539 BP: 134/68   133/89 (!) 85/55 Pulse: 86   (!) 57 97 Resp: 19 19 22 Temp: 97.4 °F (36.3 °C)   97.6 °F (36.4 °C)    
SpO2: 96% 95% 90% 97% Weight:          
Height:          
  
Intake and Output:  
03/15 1901 - 03/17 0700 In: 580 [P.O.:580] Out: 860 [Urine:860] 03/17 0701 - 03/17 1900 In: 240 [P.O.:240] Out: 2000  
  
Physical Exam:  
Constitution:  the patient is well developed and in no acute distress EENMT:  Sclera clear, pupils equal, oral mucosa moist 
Respiratory: CTA B in anterior lung fields. Decreased at B bases Cardiovascular:  RRR without M,G,R 
Gastrointestinal: soft and non-tender; with positive bowel sounds. Musculoskeletal: warm without cyanosis. There is no lower leg edema. Skin:  no jaundice or rashes, no wounds Neurologic: no gross neuro deficits    
Psychiatric:  alert and oriented x ppt 
  
CHEST XRAY:  
3/17/20 IMPRESSION: Stable bibasilar infiltrates, left greater than right Pt with recurrent respiratory failure. Feel like much of this is due to atelectasis/mucus plugging related to generalized weakness and debility. Does not appear to be related to pleural effusion. May be component of pulmonary edema with renal failure. CXR somewhat suggestive of pulmonary edema. With orthostatic hypotension feel like repeating his echo to check his current cardiac function is warranted.  
  
  
Plan:  (Medical Decision Making) -repeat TTE now.  
-cont efforts to minimize atelectasis including vibralung, hypertonic saline nebs, and will add IS.  
-cont to wean o2 as sats allow. Continue albuterol.  
-cont lasix.  
-wonder if patient needs further workup for other causes of orthostatic hypotension such as parkinson's, etc. 
-restart argatroban vs coumadin. No additional procedures planned from pulmonary at this time.  
  
   
   
Respiratory Failure GRG - Care Day 34 (3/17/2020) by Stone Ratliff RN  
 
   
Review Status Review Entered Completed 3/18/2020 14:24  
   
Criteria Review Care Day: 34 Care Date: 3/17/2020 Level of Care: Inpatient Floor Guideline Day 3 Level Of Care ( ) * Activity level acceptable   
(X) Floor to discharge 3/18/2020 14:24:23 EDT by [facility] Clinical Status ( ) * Ventilation status appropriate ( ) * Airway status acceptable ( ) * Respiratory status acceptable ( ) * Stable chest findings ( ) * Neurologic status acceptable   
(X) * Temperature status acceptable 3/18/2020 14:24:23 EDT by [facility]   
  temp 98.6   
( ) * No infection, or status acceptable ( ) * General Discharge Criteria met Interventions ( ) * No chest tube, or status acceptable ( ) * Intake acceptable ( ) * No inpatient interventions needed * Milestone Additional Notes Today: Pulmonology to perform thoracentesis today.  Patient underwent hemodialysis today. [facility] has denied the patient for LTAC placement and the appeal process has been begun. Temp Pulse Resp BP SpO2  
03/17/20 1432 - - - - 95 % 03/17/20 1232 97.4 °F (36.3 °C) 86 19 134/68 96 % 03/17/20 1200 - (!) 46 - 103/68 -  
03/17/20 1127 - 84 - 118/60 -  
03/17/20 1059 - 79 - 114/65 -  
03/17/20 1031 - 79 - 104/65 -  
03/17/20 0959 - 80 - 106/69 -  
03/17/20 0922 - 79 - 98/62 -  
03/17/20 0903 - (!) 54 - 105/55 -  
03/17/20 0830 - 82 - 137/79 -  
03/17/20 0812 - 85 - 132/70 -  
03/17/20 0728 97.7 °F (36.5 °C) 80 21 147/76 93 % 03/17/20 0340 98.2 °F (36.8 °C) 86 22 93/54 97 % 03/17/20 0146 - - - - 93 % Oxygen Therapy O2 Sat (%): 95 % (03/17/20 1432) Pulse via Oximetry: 88 beats per minute (03/17/20 1432) O2 Device: Nasal cannula (03/17/20 1432) PEEP/CPAP (cm H2O): 3 cm H20 (03/15/20 1338) O2 Flow Rate (L/min): 4 l/min(decreased from 6L) (03/17/20 1432) O2 Temperature: 87.8 °F (31 °C) (03/10/20 0618) FIO2 (%): 35 % (03/17/20 0146)  
 Physical Exam:   
General:                     alert, awake, no acute distress. Obese. Head:                          normocephalic, atraumatic Eyes, Ears, nose:      PERRL, EOMI. NC Neck:                          supple, non-tender Lungs:                        Wheezing Cardiac:                      RRR, Normal S1 and S2. Abdomen:                  Soft, non distended, nontender, +BS Extremities:               Warm, dry. B/l stasis dermatitis with mild edema. Left forearm and hand swelling Skin:                           No rashes, no jaundice Neuro:             Alert and oriented to person, time, place, situation. No gross focal neurological deficit Psychiatric:                No anxiety, calm, cooperative Medications,  
  
albuterol (PROVENTIL VENTOLIN) nebulizer solution 2.5 mg   
Dose: 2.5 mg  
Freq: EVERY 6 HOURS RESP Route: NEBULIZATION Start: 03/09/20 1400 ALPRAZolam (XANAX) tablet 0.5 mg   
Dose: 0.5 mg  
Freq: EVERY 6 HOURS AS NEEDED Route: PO  
PRN Reason: Anxiety  
  
argatroban 50 mg in 0.9% sodium chloride 50 mL (1000 mcg/mL) infusion Rate: 3-59.9 mL/hr Dose: 0.5-10 mcg/kg/min Weight Dosing Info: 99.9 kg Freq: TITRATE Route: IV  
  
furosemide (LASIX) injection 80 mg   
Dose: 80 mg  
Freq: 2 TIMES DAILY Route: IV  
  
insulin lispro (HUMALOG) injection Freq: 4 TIMES DAILY BEFORE MEALS & NIGHTLY Route: SC  
Start: 03/11/20 1130  
 Admin Instructions:  
INITIATE INSULIN CORRECTIVE PROTOCOL:  
  
midodrine (PROAMITINE) tablet 10 mg   
 Dose: 10 mg  
Freq: 3 TIMES DAILY WITH MEALS Route: PO  
Start: 02/25/20 0800 Plan Acute hypoxemic respiratory failure with Progressive LLL consolidation - ID on board: back on abx, vanc/cefepime EOT 3/13/2020.   
- S/p bronch 2/17. Pulm following.  
   
LUE pain  
- US : no DVT but Thrombosed superficial branch of the left cephalic vein.  
   
DEJUAN now on HD  
- HD per nephro  
   
DM2  
- sliding scale  
- holding Basal/prandial insulins with sugars reasonable though occasional spikes throughout the day  
   
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  
-Hold warfarin  
-Start argatroban on INR less than 2.5 to plan for thoracentesis per pulmonology  
   
CAD S/p CABG/AVR January 2020 AFib  
- Home meds.  
   
MDD, passive suicidal ideation:  
-Patient has stated on several times \"doc, just shoot me\".  Patient is voiced hopelessness and \"I just want to give up\" to nurse. Roro Youngblood asking me about physician assisted suicide.  
-Continue bupropion 75 mg twice daily  
-Continue sertraline to 100 mg daily  
-Continue mirtazapine 15 mg every night  
   
Diet:  DIET DIABETIC CONSISTENT CARB  
DIET NUTRITIONAL SUPPLEMENTS  
DVT PPx: warfarin Code: DNR

## 2020-03-24 NOTE — PROGRESS NOTES
CM spoke with patient at the request of Dr. Thea Aldridge. CM asked patient what his plan for discharge and what his goals are. Patient stated he wants to discharge to [facility] to get stronger so he can go home. CM explained to patient he will need to work with therapy while he is there so he can get stronger. Patient became upset and stated \"that is what my plan is\". Patient will discharge to [facility] at some point today. CM will continue to follow patient during hospitalization for discharge planning and needs.

## 2020-03-24 NOTE — PROGRESS NOTES
Progress Note Patient: Radha Hess MRN: [id number]  SSN: [id number] YOB: 1945  Age: 69 y.o. Sex: male Admit Date: 2/13/2020 LOS: 38 days Subjective:  
 
Patient with history of diabetes mellitus type 2, hypertension, CAD status post CABG, status post aortic valve repair, JOAQUIM on BiPAP, multiple episodes of DVT on Coumadin, ESRD on hemodialysis, positive for HIT,  
 
Admitted from hemodialysis unit with complaint of shortness of breath. Found to have left lower lobe consolidation. Treated with antibiotics, status post bronchoscopy   2/17. For hemodialysis today. No fever. No shaking. No chills. No shortness of breath. Objective:  
 
Vitals:  
 03/24/20 0726 03/24/20 0800 03/24/20 3072 03/24/20 2875 BP: 119/69 125/74 97/65 111/72 Pulse: 80 79 82 82 Resp:      
Temp:      
SpO2:      
Weight:      
Height:      
  
 
Intake and Output: 
Current Shift: No intake/output data recorded. Last three shifts: 03/22 1901 - 03/24 0700 In: -  
Out: 250 [Urine:250] Physical Exam:  
 
General:                    The patient is a pleasant elderly male in no acute respiratory  distress. Head:                                   Normocephalic/atraumatic. Eyes:                                   palpebral pallor, no scleral icterus. ENT:                                    External auricular and nasal exam within normal limits. Mucous membranes are moist. 
Neck:                                   Supple, non-tender, no JVD. Lungs:                       Clear to auscultation bilaterally without wheezes or crackles. No respiratory distress or accessory muscle use. Heart:                                  Regular rate and rhythm, without murmurs, rubs, or gallops. Abdomen:                  Soft, non-tender, non-distended with normoactive bowel sounds. Genitourinary:           No tenderness over the bladder or bilateral CVAs. Extremities:               Without clubbing, cyanosis, or edema. Skin:                                    Normal color, texture, and turgor. No rashes, lesions, or jaundice. Pulses:                      Radial and dorsalis pedis pulses present 2+ bilaterally. Capillary refill <2s. Neurologic:                CN II-XII grossly intact and symmetrical.  
                                            Moving all four extremities well with no focal deficits. Psychiatric:                Pleasant demeanor, appropriate affect. Alert and oriented x 3 Lab/Data Review: 
 
Recent Results (from the past 24 hour(s)) GLUCOSE, POC Collection Time: 03/23/20 11:05 AM  
Result Value Ref Range Glucose (POC) 257 (H) 65 - 100 mg/dL GLUCOSE, POC Collection Time: 03/23/20  3:16 PM  
Result Value Ref Range Glucose (POC) 234 (H) 65 - 100 mg/dL GLUCOSE, POC Collection Time: 03/23/20  7:56 PM  
Result Value Ref Range Glucose (POC) 306 (H) 65 - 100 mg/dL METABOLIC PANEL, BASIC Collection Time: 03/24/20  5:20 AM  
Result Value Ref Range Sodium 131 (L) 136 - 145 mmol/L Potassium 4.1 3.5 - 5.1 mmol/L Chloride 97 (L) 98 - 107 mmol/L  
 CO2 25 21 - 32 mmol/L Anion gap 9 7 - 16 mmol/L Glucose 158 (H) 65 - 100 mg/dL BUN 45 (H) 8 - 23 MG/DL Creatinine 5.58 (H) 0.8 - 1.5 MG/DL  
 GFR est AA 13 (L) >60 ml/min/1.73m2 GFR est non-AA 11 (L) >60 ml/min/1.73m2 Calcium 8.4 8.3 - 10.4 MG/DL PROTHROMBIN TIME + INR Collection Time: 03/24/20  5:20 AM  
Result Value Ref Range Prothrombin time 33.5 (H) 12.0 - 14.7 sec INR 3.2 HGB & HCT Collection Time: 03/24/20  5:20 AM  
Result Value Ref Range HGB 9.9 (L) 13.6 - 17.2 g/dL HCT 31.0 (L) 41.1 - 50.3 % GLUCOSE, POC Collection Time: 03/24/20  6:53 AM  
Result Value Ref Range Glucose (POC) 153 (H) 65 - 100 mg/dL XR chest  
3- IMPRESSION: Left lower lobe atelectasis or pneumonia left pleural effusion Unchanged. Current Facility-Administered Medications:  
  triamcinolone acetonide (KENALOG) 0.1 % cream, , Topical, BID, Rafael Darden MD 
  Warfarin on Hold, , Other, Rx Dosing/Monitoring, Jerson Larsen MD 
  furosemide (LASIX) tablet 40 mg, 40 mg, Oral, DAILY, Mahin Pham MD, 40 mg at 03/23/20 0642   alum-mag hydroxide-simeth (MYLANTA) oral suspension 30 mL, 30 mL, Oral, Q4H PRN, Christine Salcedo MD, 30 mL at 03/21/20 1956   sertraline (ZOLOFT) tablet 100 mg, 100 mg, Oral, DAILY, Jerson Larsen MD, 100 mg at 03/23/20 0823 
  buPROPion [facility] tablet 75 mg, 75 mg, Oral, BID, Jerson Larsen MD, 75 mg at 03/23/20 1700   loratadine (CLARITIN) tablet 10 mg, 10 mg, Oral, DAILY, Jerson Larsen MD, 10 mg at 03/23/20 4439   diphenhydrAMINE (BENADRYL) capsule 25 mg, 25 mg, Oral, Q6H PRN, Lisy Hunter MD, 25 mg at 03/23/20 5288   mirtazapine (REMERON) tablet 15 mg, 15 mg, Oral, QHS, Lisy Hunter MD, 15 mg at 03/23/20 2114   ALPRAZolam [place]) tablet 0.5 mg, 0.5 mg, Oral, Q6H PRN, Lisy Hunter MD, 0.5 mg at 03/23/20 2129 
  bacitracin 500 unit/gram ointment, , Topical, BID, Zenaida Larsen MD 
  insulin lispro (HUMALOG) injection, , SubCUTAneous, AC&HS, Katie Reid MD, 12 Units at 03/23/20 2115   dextrose 40% (GLUTOSE) oral gel 1 Tube, 15 g, Oral, PRN, Zenaida Larsen MD 
  glucagon (GLUCAGEN) injection 1 mg, 1 mg, IntraMUSCular, PRN, Zenaida Larsen MD 
  dextrose (D50W) injection syrg 12.5-25 g, 25-50 mL, IntraVENous, PRN, Zenaida Larsen MD 
  HYDROcodone-homatropine (HYCODAN) 5-1.5 mg/5 mL (5 mL) syrup 5 mL, 5 mL, Oral, Q4H PRN, Theron Mae MD, 5 mL at 03/09/20 1028   benzonatate (TESSALON) capsule 100 mg, 100 mg, Oral, TID PRN, [place] Radha Fuentes MD, 100 mg at 03/09/20 1028 
  guaiFENesin ER [facility]) tablet 1,200 mg, 1,200 mg, Oral, Q12H, SURENDRA Cotton, 1,200 mg at 03/23/20 2113   albuterol (PROVENTIL VENTOLIN) nebulizer solution 2.5 mg, 2.5 mg, Nebulization, Q6H RT, Sally Awad PA, Stopped at 03/24/20 0200 
  insulin glargine (LANTUS) injection 5 Units, 5 Units, SubCUTAneous, QHS, Claudell Carbine, MD, 5 Units at 03/23/20 2116   sodium chloride (OCEAN) 0.65 % nasal squeeze bottle 2 Spray, 2 Spray, Both Nostrils, Q2H PRN, Jocelyn, Blane Pretty MD, 2 Julian at 03/23/20 1701   morphine injection 2 mg, 2 mg, IntraVENous, Q6H PRN, Sandra Welch MD, 2 mg at 03/13/20 1124 
  sodium chloride 3% hypertonic nebulizer soln, 4 mL, Nebulization, BID RT, Francesca Damon NP, 4 mL at 03/23/20 2034   sodium chloride (OCEAN) 0.65 % nasal squeeze bottle 2 Spray, 2 Spray, Both Nostrils, BID, Tim Sousa MD, 2 Julian at 03/23/20 1701   epoetin maritza-epbx (RETACRIT) 14,000 Units combo injection, 14,000 Units, SubCUTAneous, Q7D, Nivia Navas MD, 14,000 Units at 03/21/20 1239   polyethylene glycol (MIRALAX) packet 17 g, 17 g, Oral, DAILY, Morena Wright MD, 17 g at 03/18/20 0859 
  midodrine (PROAMITINE) tablet 10 mg, 10 mg, Oral, TID WITH MEALS, Morena Wright MD, 10 mg at 03/23/20 1657   tamsulosin (FLOMAX) capsule 0.4 mg, 0.4 mg, Oral, QHS, You Almazan, Shari Billy MD, 0.4 mg at 03/23/20 2114   loperamide (IMODIUM) capsule 4 mg, 4 mg, Oral, QID PRN, Qamar Reid MD, 4 mg at 02/24/20 0168   pantothenic ac-min oil-pet,hyd (AQUAPHOR) 41 % ointment, , Topical, DAILY, Annika Gelyanethu B, NP 
  traMADol (ULTRAM) tablet 50 mg, 50 mg, Oral, Q6H PRN, Saadia Cool, NP 
  sodium chloride (NS) flush 5-40 mL, 5-40 mL, IntraVENous, PRN, Annika Gelcolten B, NP, 5 mL at 03/19/20 2153   acetaminophen (TYLENOL) tablet 650 mg, 650 mg, Oral, Q4H PRN, Ramirez aPlomares, Seth Brink NP, 650 mg at 03/06/20 2306   naloxone [facility]) injection 0.4 mg, 0.4 mg, IntraVENous, PRN, Suleman Butts NP 
  ondansetron [facility]) injection 4 mg, 4 mg, IntraVENous, Q4H PRN, Karen HOUSER NP, 4 mg at 03/19/20 2151   bisacodyL (DULCOLAX) tablet 5 mg, 5 mg, Oral, DAILY PRN, Karen Alcalakle B, NP, 5 mg at 02/26/20 1736 Assessment:  
 
Active Problems: 
  DM type 2 (diabetes mellitus, type 2) ([address] (1/14/2013) HLD (hyperlipidemia) (1/14/2013) Obesity, morbid ([address] (10/19/2018) Type 2 diabetes with nephropathy ([address] (8/26/2019) CAD (coronary artery disease) (1/10/2020) S/P CABG x 3 (1/10/2020) S/P AVR (1/10/2020) Atrial fibrillation ([address] (1/13/2020) HIT (heparin-induced thrombocytopenia) [facility] (1/23/2020) Pneumonia due to infectious organism (2/14/2020) Hypotension (2/18/2020) Acute renal failure on dialysis ([address] (2/25/2020) DNR (do not resuscitate) (2/26/2020) Acute on chronic respiratory failure with hypoxia ([address] (3/8/2020) Plan:  
 
Acute hypoxemic respiratory failure with lower lobe consolidation on the left Improved off antibiotics As per pulmonologist, patient is deemed stable to be discharged to a short-term rehabilitation facility Left arm pain No DVT Superficial branch of left cephalic vein thrombosis. Symptomatic relief. Pain is improved. ESRD on hemodialysis. Continue current schedule. Diabetes mellitus type 2 Monitor blood sugar. Cover with insulin sliding scale accordingly. History of DVT with positive HIT Pharmacy is helping with Coumadin dosing. INR is 3.2 today. I have discussed the plan of care with patient. DVT prophylaxis : already on 4 [facility]. Pending discharge planning. CM is helping. Signed By: Vaughan Dubin, MD   
 March 24, 2020

## 2020-03-24 NOTE — PROGRESS NOTES
Interdisciplinary Rounds completed. Nursing, Case Management, and Physician  present. Plan of care reviewed and updated. Will discharge to [facility] with bed is available.

## 2020-03-24 NOTE — PROGRESS NOTES
PT note: Therapy attempted this morning however pt off floor to dialysis. Will check back as schedule allows.   
 
Braeden Shaikh DPT

## 2020-03-24 NOTE — PROGRESS NOTES
Care Management Interventions PCP Verified by CM: Yes Mode of Transport at Discharge: BLS(Hien Whtie Spouse [phone] ) Transition of Care Consult (CM Consult): Discharge Planning, SNF Discharge Durable Medical Equipment: No 
Physical Therapy Consult: Yes Occupational Therapy Consult: Yes Speech Therapy Consult: No 
Current Support Network: [address] [place] [address] Confirm Follow Up Transport: Family The Plan for Transition of Care is Related to the Following Treatment Goals : SNF The Patient and/or Patient Representative was Provided with a Choice of Provider and Agrees with the Discharge Plan?: Yes Name of the Patient Representative Who was Provided with a Choice of Provider and Agrees with the Discharge Plan: Pt son Freedom of Choice List was Provided with Basic Dialogue that Supports the Patient's Individualized Plan of Care/Goals, Treatment Preferences and Shares the Quality Data Associated with the Providers?: Yes Discharge Location Discharge Placement: [address] [place] [address] Patient will discharge to [facility] today. Patient and family aware of discharge. CM provided RN Maged Lindsay with the report number and explained to send the discharge summary to the 4th floor with the patient. Maged Lindsay verbalized understanding. Patient will transport to the 4th floor with hospital transport. All milestones have been met for discharge.

## 2020-03-24 NOTE — DIALYSIS
TRANSFER OUT- DIALYSIS Hemodialysis treatment completed without complications. Patient alert and VS stable  /64  P 86   
 
 1.9 Kgs removed. Flushed both ports with 10 mL of NS.  CVC dressing clean, dry, and intact, tego caps intact, bilateral lumens wrapped with 4x4 gauze. Patient to room 604 after dialysis.

## 2020-03-24 NOTE — PROGRESS NOTES
Nutrition follow-up 
  
Assessment: Admitted with A/chronic hypoxic respiratory failure/pna, DVT, CAD s/p CABG s/p AVR, HTN, DM, ESRD on HD.  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 He continues on HD. Patient awaiting bed at [facility] for transfer. He was seen in follow-up today. He states that he does not eat well on HD days. He had HD today. He reports he was able to eat cereal this am, but could not look at lunch after HD. He has another box of cereal on table that he states he is saving for lunch. When asked about decline in intake recently as he was doing much better previously, he just states that his appetite is not as good as it was and he is doing not as well overall. He is agreeable to increasing Nepro at this time. DIET DIABETIC CONSISTENT CARB Regular DIET Nepro with breakfast   
Per charting intake of meals 0-100% of meals, mostly 0-30%. Anthropometrics: 
Height: 5' 10\" (177.8 cm), Weight: 99.8 kg (220 lb 0.3 oz), Weight Source: Bed, Body mass index is 31.57 kg/m². BMI class of obese. His weight has fluctuated per EMR review but is likely associated with start of HD, fluid status an/or poor po intake.  
Macronutrient needs: (using IBW (Ideal body weight) 75.5 kg) ZPQ: [id number] LYKE/ADH (25-30 kcal/kg) EPR:  g/day (1.2-1.4 g/kg) 
     
Nutrition Intervention: 
Meals and snacks: Continue current diet, with chopped meats. Medical food supplement therapy: Increase Nepro to TID  - patient prefers berry. Discharge Nutrition Plan: Plan to transfer to [facility], patient will likely need Nepro 2-3 per day or equivalent. [address] [facility] [address] [place] [address], [address], [address] [facility]

## 2020-03-24 NOTE — PROGRESS NOTES
TRANSFER IN - DIALYSIS Received patient in dialysis unit  from [facility] (unit) for ordered procedure. Consent verified for renal replacement therapy. Patient alert and vital signs stable. Hemodialysis initiated using Catheter. Aspirated and flushed both ports without difficulty. Dressing clean, dry and intact. Machine settings per MD order. Heparin 0 unit bolus and 0 units/hr. Will monitor during treatment.

## 2020-03-31 NOTE — PROGRESS NOTES
Transition of Care outreached postponed for 30 days due to patients discharge to long term acute care facility.

## 2020-04-27 PROBLEM — Z79.01 LONG TERM (CURRENT) USE OF ANTICOAGULANTS: Status: ACTIVE | Noted: 2020-01-01

## 2020-04-27 PROBLEM — Z95.0 PACEMAKER: Status: ACTIVE | Noted: 2020-01-01

## 2020-05-06 PROBLEM — J96.21 ACUTE ON CHRONIC RESPIRATORY FAILURE WITH HYPOXIA (HCC): Status: RESOLVED | Noted: 2020-01-01 | Resolved: 2020-01-01

## 2020-05-06 PROBLEM — I35.0 AORTIC VALVE STENOSIS: Chronic | Status: RESOLVED | Noted: 2020-01-01 | Resolved: 2020-01-01

## 2020-05-06 PROBLEM — I35.0 NONRHEUMATIC AORTIC VALVE STENOSIS: Chronic | Status: RESOLVED | Noted: 2018-04-13 | Resolved: 2020-01-01

## 2020-05-06 PROBLEM — J18.9 PNEUMONIA DUE TO INFECTIOUS ORGANISM: Status: RESOLVED | Noted: 2020-01-01 | Resolved: 2020-01-01

## 2020-05-08 NOTE — PERIOP NOTES
Patient verified name and  Order for consent yes found in EHR  and matches case posting; patient verified. PT STATES HE HAS BEEN TESTED FOR COVID  WED 20 Type 2 surgery, pat assessment complete. Labs per surgeon: cbc,bmp; results = Labs per anesthesia protocol: poc glucose; results 255 EK,pacer [id number] Hospital approved surgical skin cleanser and instructions given per hospital policy. Patient provided with and instructed on educational handouts including Guide to Surgery, Pain Management, Hand Hygiene, Blood Transfusion Education, and [employer] Anesthesia Brochure. Patient answered medical/surgical history questions at their best of ability. All prior to admission medications documented in [facility]. Original medication prescription bottle none visualized during patient appointment. Patient instructed to hold all vitamins 7 days prior to surgery and NSAIDS 5 days prior to surgery, patient verbalized understanding. Patient teach back successful and patient demonstrates knowledge of instructions.

## 2020-05-08 NOTE — PERIOP NOTES
Dr. Caitlin Villar informed of pt Hx and recent CABG and AVR, admitted for sepsis after CABG. Pt can take midodrine and florinef on DOS. Also Pacer rep is Not needed for DOS.

## 2020-05-08 NOTE — PERIOP NOTES
PLEASE CONTINUE TAKING ALL PRESCRIPTION MEDICATIONS UP TO THE DAY OF SURGERY UNLESS OTHERWISE DIRECTED BELOW. DISCONTINUE all vitamins and supplements 7 days prior to surgery. DISCONTINUE Non-Steriodal Anti-Inflammatory (NSAIDS) such as Advil and Aleve 5 days prior to surgery. Home Medications to take 
the day of surgery Albuterol as needed,wellbutrin,florinef,,midodrine,,zoloft, lantus insulin=4 units, night before=20 units Home Medications  
to Hold  
none Comments Please do not bring home medications with you on the day of surgery unless otherwise directed by your nurse. If you are instructed to bring home medications, please give them to your nurse as they will be administered by the nursing staff. If you have any questions, please call [address] [place] [address] [phone] or 024 [facility] [phone]. A copy of this note was provided to the patient for reference.

## 2020-05-08 NOTE — PERIOP NOTES
Spoke with Pt Daughter-in-law/lore. Reviewed medications and patient guide to surgery- instructed her to have pt continue Maria Courser as prescribed. She verbalized understanding

## 2020-05-11 PROBLEM — I96 GANGRENE OF TOE OF BOTH FEET (HCC): Status: ACTIVE | Noted: 2020-01-01

## 2020-05-11 NOTE — ANESTHESIA PREPROCEDURE EVALUATION
Relevant Problems No relevant active problems Anesthetic History No history of anesthetic complications Review of Systems / Medical History Patient summary reviewed, nursing notes reviewed and pertinent labs reviewed Pulmonary Sleep apnea Shortness of breath Neuro/Psych Psychiatric history Cardiovascular Hypertension Valvular problems/murmurs CAD and hyperlipidemia Exercise tolerance: <4 METS Comments: S/p CABG AVR Jan 2020 GI/Hepatic/Renal 
  
 
 
 
 
 
 Endo/Other Diabetes Hypothyroidism: well controlled Morbid obesity and arthritis Other Findings Physical Exam 
 
Airway Mallampati: II 
 
 
Mouth opening: Normal 
 
 Cardiovascular Dental 
No notable dental hx Pulmonary Abdominal 
 
 
 
 Other Findings Anesthetic Plan ASA: 4 Anesthesia type: general 
 
 
 
 
Induction: Intravenous Anesthetic plan and risks discussed with: Patient

## 2020-05-11 NOTE — PROGRESS NOTES
Spoke with Dr. Susie Wallace regarding INR of 5.1. Notified confirmation has been sent. He wants to be notified with that result.

## 2020-05-11 NOTE — PROGRESS NOTES
's pre-op visit and prayer with patient as requested. Jeet Sneed MDiv, BS Board Certified [employer]

## 2020-05-11 NOTE — PROGRESS NOTES
Critical result of INR from lab of 3.8. Dr. Yonathan Yanes notified. Dr. Jed Childress notified of cancellation.

## 2020-05-11 NOTE — PROGRESS NOTES
Notified Ayde Mina, daughter-in-law, about cancellation. Also patient is not to take Coumadin today or tomorrow. Office will call to reschedule surgery for Wednesday.

## 2020-05-14 PROBLEM — R09.02 HYPOXIA: Status: ACTIVE | Noted: 2020-01-01

## 2020-05-14 PROBLEM — I96 GANGRENOUS TOE (HCC): Status: ACTIVE | Noted: 2020-01-01

## 2020-05-14 PROBLEM — I73.9 PAD (PERIPHERAL ARTERY DISEASE) (HCC): Status: ACTIVE | Noted: 2020-01-01

## 2020-05-14 NOTE — ADT AUTH CERT NOTES
Now Outpatient RE: [id number] [facility] Received: Today Message Contents OLYA Kimball Cc: Fred Evans RN  
  
   
  
Per response received from Dr. Radhika Gentile, Harpal Solis will bill this encounter as OP\".    
 
Rachel, Do you mind changing the Epic header? Thanks!   
 
Duy Workman

## 2020-05-14 NOTE — PROGRESS NOTES
Warfarin dosing per pharmacist 
 
Frances Galaviz is a 69 y.o. male. Indication:  Afib Goal INR:  2.5-3.5 Home dose:  5 mg Sun, Tu, Thu; 7.5 mg all other days Risk factors or significant drug interactions:  none Other anticoagulants:  N/A Daily Monitoring Date  INR     Warfarin dose HGB              Notes 5/14  2.0  5 mg   --- Patient on warfarin for chronic afib. INR is therapeutic. Will give 5 mg dose tonight Pharmacy will continue to follow INR Thank you, Giselle Herrera, PharmD Clinical Pharmacy PGY1 Resident [phone]\

## 2020-05-14 NOTE — H&P
[address], [place]. [address] [facility] FAX: [phone] Mary Jo Valenzuela 1945 No chief complaint on file. 
 
 
  
HPI: 44-year-old underwent aortic valve surgery in January of this year that a long complicated inpatient course. During that time he was noted to have gangrenous changes of the right and left first through fourth toes. He presents today for evaluation of his toes as well as his circulation. The toes on the left foot #1 through 3 are have dry eschar down to the interphalangeal joints. On the right first through third as well as a portion of the fourth toe are involved. He does not have any evidence of secondary infection. He denies any recent acute illness since his discharge from the hospital. 
 
Was scheduled for earlier this week but INR was elevated and case cancelled. Rescheduled for today. 
  
  
ROS: 
  
Constitutional: Negative for fever and chills. HENT: Negative for congestion and sore throat. Skin: Negative for rash and itching. Eyes: Negative for blurred vision and double vision. Respiratory: Negative for cough and shortness of breath. Cardiovascular: Negative for chest pain, palpitations, claudication and leg swelling. Gastrointestinal: Negative for nausea, vomiting and abdominal pain. Genitourinary: Negative for dysuria, hematuria and flank pain. Musculoskeletal: Negative for joint pain and falls. Neurological: Negative for dizziness, sensory change, focal weakness, loss of consciousness and headaches.  
  
Medical history:  
    
Past Medical History:  
Diagnosis Date  Arthritis    
 BPH (benign prostatic hyperplasia) 1/14/2013  CAD (coronary artery disease)    
 DM type 2 (diabetes mellitus, type 2) ([facility] dx 2004  
  Type 2, avg fbs , recognizes hypo at 66, last HA1C was 9.2 on 5/2014  Dyspnea    
  at times, Uses Albuterol inhaler when needed (last used first of aug 2014)  Gout 1/14/2013  HLD (hyperlipidemia) 1/14/2013  
 HTN (hypertension)    
  controlled with meds  Morbid obesity ([address] 9/3/14  
  BMI 41.7  Psychiatric disorder    
  anxiety, controlled with buspar  Rheumatic fever    
  as a child  Seasonal allergic rhinitis    
  treated with Allegra  Severe aortic valve stenosis    
  echo 09/11/19 EF 60-65%- mild to moderate regurgitation  Thyroid disease    
  hx- no longer takes synthroid  Unspecified sleep apnea 2016  
  bi pap  
  
  
Surgical history:  
     
Past Surgical History:  
Procedure Laterality Date  HX CATARACT REMOVAL Bilateral 2012  HX HEART CATHETERIZATION   12/23/2019  HX ORTHOPAEDIC Left    
  carpal tunnel  HX TONSIL AND ADENOIDECTOMY      
 IR INSERT TUNL CVC W PORT OVER 5 YEARS   2/4/2020  
  
  
Allergies: Allergies Allergen Reactions  Heparin Other (comments)  
    HIT antibody test strongly positive on 1/17/2020. SHARON drawn 1/18/2020 and resulted positive on 1/21/2020  Amoxicillin Rash  Keflex [Cephalexin] Rash  Pcn [Penicillins] Other (comments)  
    \"felt closed in\". No rash  
  
  
Current medications:  
     
Current Outpatient Medications Medication Sig Dispense  allopurinoL (ZYLOPRIM) 100 mg tablet Take 1 Tab by mouth daily. 90 Tab  predniSONE (DELTASONE) 5 mg tablet One a day po for 5 days, 1/2 tab po daily x 5 days and stop 10 Tab  tamsulosin (Flomax) 0.4 mg capsule Take 1 Cap by mouth daily. 90 Cap  sertraline (ZOLOFT) 100 mg tablet Take 1 Tab by mouth daily. 90 Tab  OLANZapine (ZyPREXA) 5 mg tablet Take 1 Tab by mouth nightly. 90 Tab  mirtazapine (Remeron) 15 mg tablet Take 1 Tab by mouth nightly. 90 Tab  insulin glargine (Lantus Solostar U-100 Insulin) 100 unit/mL (3 mL) inpn 25 units sq qhs and 5 units sq qam 15 Pen  furosemide (LASIX) 40 mg tablet Take 1 Tab by mouth daily. Take 1 tablet daily AM 90 Tab  buPROPion (WELLBUTRIN) 75 mg tablet Take 1 Tab by mouth two (2) times a day. 180 Tab  ALPRAZolam (XANAX) 0.5 mg tablet Take 1 Tab by mouth nightly as needed for Anxiety. Max Daily Amount: 0.5 mg. 90 Tab  insulin aspart U-100 (NovoLOG) 100 unit/mL crtg Check sugar qac. Less than 200 no units, 201-250 2 units sq, 251-300 4 units sq, 301-350 6 units sq, 351-400 8 units sq, over 401 10 units sq. 15 Cartridge  Jantoven 7.5 mg tablet 7.5 mg. Mon, Wed and Friday    
 fludrocortisone (FLORINEF) 0.1 mg tablet      
 predniSONE (DELTASONE) 10 mg tablet      
 Jantoven 5 mg tablet 5 mg. Tues, Thurs and Sunday    
 midodrine (PROAMATINE) 10 mg tablet      
 albuterol sulfate 2.5 mg/0.5 mL nebu 2.5 mg, ipratropium 0.02 % soln 0.5 mg 1 Dose by Nebulization route. 0.083%    
 insulin glargine (LANTUS) 100 unit/mL injection Per sliding scale (Patient taking differently: No sig reported) 1 Vial  
 bacitracin (BACITRACIN) 500 unit/gram oint Apply  to affected area two (2) times a day. 30 g  
 multivitamins-minerals-lutein (CENTRUM SILVER) Tab Take 1 tablet by mouth daily.    
 coenzyme q10-vitamin e (COQ10 ) 100-100 mg-unit cap Take 300 mg by mouth daily.    
  
No current facility-administered medications for this visit.   
  
  
Social history:  
Social History  
  
   
Tobacco Use Smoking Status Never Smoker Smokeless Tobacco Never Used  
  
                         
Social History  
  
    
Substance and Sexual Activity Alcohol Use Yes  Alcohol/week: 0.0 standard drinks  
  Comment: rarely  
  
  
Imaging: Vascular studies demonstrate ankle-brachial disease greater than 1 bilaterally. He has evidence of tibial occlusive disease however he does have inline flow via his peroneal arteries bilaterally with multiphasic waveform. 
  
Vitals:  
Vitals:  
  05/04/20 0853 BP: 170/90 BP 1 Location: Right arm BP Patient Position: Sitting Pulse: 84 Temp: 98.3 °F (36.8 °C) TempSrc: Tympanic SpO2: 92% Weight: 250 lb (113.4 kg) Height: 5' 10\" (1.778 m)  
  
  
Physical exam: 
Visit Vitals /90 (BP 1 Location: Right arm, BP Patient Position: Sitting) Pulse 84 Temp 98.3 °F (36.8 °C) (Tympanic) Ht 5' 10\" (1.778 m) Wt 250 lb (113.4 kg) SpO2 92% BMI 35.87 kg/m²  
  
General appearance: alert, cooperative, no distress, appears stated age Lungs: clear to auscultation bilaterally Heart: regular rate and rhythm, S1, S2 normal, no murmur, click, rub or gallop Abdomen: soft, non-tender. Bowel sounds normal. No masses,  no organomegaly Extremities: extremities normal, atraumatic, no cyanosis or edema Skin: Skin color, texture, turgor normal. No rashes or lesions, gangrene/eschar of toes on both feet as described above. Vascular Exam: 
  
Right:                                       LEFT:                                                                           
                                                 
       Radial: 2+                                     Radial: 2+ Brachial: 2+                                  Brachial: 2+ Femoral: 2+                                  Femoral: 2+ Pedal pulses are nonpalpable Carotid Bruit: No                          Carotid Bruit: No 
  
Impression:  
    ICD-10-CM ICD-9-CM    
1. Gangrene of toe of both feet ([facility] I96 785. 4    
  
  
Plan: I think that we can salvage most of the toes on the left foot with partial amputations and/or debridement of the eschar. On the right the first and second toe likely or not salvageable I think toes 3 and 4 are salvageable and may require partial amputation. I offered to him that he may ultimately wind up with bilateral transmetatarsal amputations but this point he like to proceed with salvage as much of his toes he can as long as he is able to have adequate foot footwear and able to ambulate. 
  
  
I have discussed the need for the procedure with the patient and family.  I have discussed the procedure with the patient/family in detail today including but not limited to the risk of death, bleeding requiring transfusion, infection, limb loss, or the necessity of subsequent procedures. All questions were answered. They understand and agree to proceed. H&P is current. Physical exam unchanged. I have discussed the need for the procedure with the patient. I have discussed the procedure with the patient/family in detail today including but not limited to the risk of death, bleeding requiring transfusion, infection, limb loss, or the necessity of subsequent procedures. All questions were answered. He understands and agree to proceed.  
 
Devante Sim MD

## 2020-05-14 NOTE — ANESTHESIA PREPROCEDURE EVALUATION
Relevant Problems No relevant active problems Anesthetic History No history of anesthetic complications Review of Systems / Medical History Patient summary reviewed, nursing notes reviewed and pertinent labs reviewed Pulmonary Sleep apnea Shortness of breath Neuro/Psych Psychiatric history Cardiovascular Hypertension Valvular problems/murmurs CAD and hyperlipidemia Exercise tolerance: <4 METS Comments: S/p CABG AVR Jan 2020 GI/Hepatic/Renal 
  
 
 
 
 
 
 Endo/Other Diabetes Hypothyroidism: well controlled Morbid obesity and arthritis Other Findings Physical Exam 
 
Airway Mallampati: II 
 
 
Mouth opening: Normal 
 
 Cardiovascular Dental 
No notable dental hx Pulmonary Abdominal 
 
 
 
 Other Findings Anesthetic Plan ASA: 4, emergent Anesthesia type: general 
 
 
 
 
Induction: Intravenous Anesthetic plan and risks discussed with: Patient

## 2020-05-14 NOTE — ANESTHESIA POSTPROCEDURE EVALUATION
Procedure(s): 
AMPUTATION RIGHT  1ST-4TH TOES AND LEFT 2ND - 3RD TOES . general 
 
Anesthesia Post Evaluation Multimodal analgesia: multimodal analgesia used between 6 hours prior to anesthesia start to PACU discharge Patient location during evaluation: PACU Patient participation: complete - patient participated Level of consciousness: awake and alert Pain management: adequate Airway patency: patent Anesthetic complications: no 
Cardiovascular status: acceptable Respiratory status: spontaneous ventilation, nasal cannula and acceptable (tachypneic at baseline) Hydration status: acceptable Post anesthesia nausea and vomiting:  none Vitals Value Taken Time /86 5/14/2020  2:11 PM  
Temp 36.4 °C (97.6 °F) 5/14/2020  2:06 PM  
Pulse 81 5/14/2020  2:13 PM  
Resp 18 5/14/2020  2:06 PM  
SpO2 91 % 5/14/2020  2:13 PM  
Vitals shown include unvalidated device data.

## 2020-05-14 NOTE — PERIOP NOTES
TRANSFER - OUT REPORT: 
 
Verbal report given to Wilberto Mendez on Kim Linea  being transferred to [phone] for routine post - op Report consisted of patients Situation, Background, Assessment and  
Recommendations(SBAR). Information from the following report(s) OR Summary, Procedure Summary, Intake/Output and MAR was reviewed with the receiving nurse. Lines:  
Peripheral IV 05/14/20 Anterior;Right Hand (Active) Site Assessment Clean, dry, & intact 5/14/2020  2:06 PM  
Phlebitis Assessment 0 5/14/2020  2:06 PM  
Infiltration Assessment 0 5/14/2020  2:06 PM  
Dressing Status Clean, dry, & intact 5/14/2020  2:06 PM  
Dressing Type Tape;Transparent 5/14/2020  2:06 PM  
Hub Color/Line Status Pink;Patent 5/14/2020  2:06 PM  
Alcohol Cap Used No 5/14/2020  2:06 PM  
  
 
Opportunity for questions and clarification was provided. Patient transported with: 
 O2 @ 2 liters VTE prophylaxis orders have not been written for Kim Linea. Patient and family given floor number and nurses name. Family updated re: pt status after security code verified.

## 2020-05-14 NOTE — PROGRESS NOTES
05/14/20 1521 Dual Skin Pressure Injury Assessment Dual Skin Pressure Injury Assessment X Second Care Provider (Based on [address] [place] [address]) Wendy Mcclelland Sacrum  Mid Allevyn placed on sacrum, old pressure ulcer Surgical sites to bilat feet

## 2020-05-14 NOTE — CONSULTS
CONSULT NOTE Gay Musa 5/14/2020 Date of Admission:  5/14/2020 The patient's chart is reviewed and the patient is discussed with the staff. Subjective:  
 
Patient is a 69 y.o.  male seen and evaluated at the request of Dr. Lilian Peña. 
Jeff Roach underwent aortic valve surgery in January of this year that a long complicated inpatient course.  During that time he was noted to have gangrenous changes of the right and left first through fourth toes.  He presents today for evaluation of his toes as well as his circulation.  The toes on the left foot #1 through 3 are have dry eschar down to the interphalangeal joints.  On the right first through third as well as a portion of the fourth toe are involved. Cheryl Leon does not have any evidence of secondary infection.  He denies any recent acute illness since his discharge from the hospital. 
  
Was scheduled for earlier this week but INR was elevated and case cancelled. Rescheduled for today. We are asked to assess patient for hypoxia post op. The patient denies dyspnea, fever, chills, cough or wheezing. He was placed on supplemental O2 at 2L with improvement in O2 sat. CXR is P Review of Systems A comprehensive review of systems was negative except for that written in the HPI. Patient Active Problem List  
Diagnosis Code  DM type 2 (diabetes mellitus, type 2) ([facility] E11.9  
 Gout M10.9  BPH (benign prostatic hyperplasia) N40.0  Seasonal allergic rhinitis J30.2  
 HLD (hyperlipidemia) E78.5  Obesity, morbid ([facility] E66.01  
 Type 2 diabetes with nephropathy (HCC) E11.21  
 Bilateral carotid artery stenosis I65.23  
 CAD (coronary artery disease) I25.10  
 S/P CABG x 3 Z95.1  Status post aortic valve replacement Z95.2  Atrial fibrillation (HCC) I48.91  
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  
 Hypotension I95.9  Acute renal failure on dialysis ([facility]) N17.9, Z99.2  DNR (do not resuscitate) [phone]  Long term (current) use of anticoagulants Z79.01  
 Pacemaker Z95.0  Gangrene of toe of both feet ([facility]) I96  
 Gangrenous toe ([address] I96  
 PAD (peripheral artery disease) ([facility]) I73.9 Prior to Admission Medications Prescriptions Last Dose Informant Patient Reported? Taking? ALPRAZolam (XANAX) 0.5 mg tablet 2020 at Unknown time  No Yes Sig: Take 1 Tab by mouth nightly as needed for Anxiety. Max Daily Amount: 0.5 mg.  
Jantoven 5 mg tablet 2020 at Unknown time  Yes Yes Si mg. Chace Forte and  or as directed Jantoven 7.5 mg tablet 2020 at Unknown time  Yes Yes Si.5 mg. Mon, Wed and Friday or as directed OLANZapine (ZyPREXA) 5 mg tablet 2020 at Unknown time  No Yes Sig: Take 1 Tab by mouth nightly. albuterol sulfate 2.5 mg/0.5 mL nebu 2.5 mg, ipratropium 0.02 % soln 0.5 mg 2020 at Unknown time  Yes Yes Si Dose by Nebulization route nightly. 0.083% buPROPion (WELLBUTRIN) 75 mg tablet 2020 at Unknown time  No Yes Sig: Take 1 Tab by mouth two (2) times a day. fludrocortisone (FLORINEF) 0.1 mg tablet 2020 at Unknown time  Yes Yes Si.1 mg daily. 2 tabs po daily  
furosemide (LASIX) 40 mg tablet 2020 at Unknown time  No Yes Sig: Take 1 Tab by mouth daily. Take 1 tablet daily AM  
insulin aspart U-100 (NovoLOG) 100 unit/mL crtg 2020 at Unknown time  No Yes Sig: Check sugar qac. Less than 200 no units, 201-250 2 units sq, 251-300 4 units sq, 301-350 6 units sq, 351-400 8 units sq, over 401 10 units sq. insulin glargine (Lantus Solostar U-100 Insulin) 100 unit/mL (3 mL) inpn 2020 at Unknown time  No Yes Si units sq qhs and 5 units sq qam  
midodrine (PROAMATINE) 10 mg tablet 2020 at Unknown time  Yes Yes  
mirtazapine (Remeron) 15 mg tablet 2020 at Unknown time  No Yes Sig: Take 1 Tab by mouth nightly. potassium chloride (KLOR-CON) 10 mEq tablet 5/13/2020 at Unknown time  No Yes Sig: Take 1 Tab by mouth daily. sertraline (ZOLOFT) 100 mg tablet 5/14/2020 at Unknown time  No Yes Sig: Take 1 Tab by mouth daily. tamsulosin (Flomax) 0.4 mg capsule 5/14/2020 at Unknown time  No Yes Sig: Take 1 Cap by mouth daily. Facility-Administered Medications: None Past Medical History:  
Diagnosis Date  Arthritis  BPH (benign prostatic hyperplasia) 1/14/2013  CAD (coronary artery disease) CABG- 1-2020  DM type 2 (diabetes mellitus, type 2) ([facility] dx 2004 Type 2, avg fbs 250 recognizes hypo at 67,  Dyspnea   
 at times, Uses Albuterol inhaler when needed (last used first of aug 2014)  Gout 1/14/2013  HLD (hyperlipidemia) 1/14/2013  
 HTN (hypertension)   
 controlled with meds  Morbid obesity ([facility] 9/3/14 BMI 41.7  Oxygen dependent 2.5 liters mainly at night, as needed  Psychiatric disorder   
 anxiety, controlled with buspar  Rheumatic fever   
 as a child  Seasonal allergic rhinitis   
 treated with Allegra  Severe aortic valve stenosis   
 echo 09/11/19 EF 60-65%- mild to moderate regurgitation  Thyroid disease   
 hx- no longer takes synthroid  Unspecified sleep apnea 2016  
 no bipap at this time Past Surgical History:  
Procedure Laterality Date  HX CATARACT REMOVAL Bilateral 2012  HX HEART CATHETERIZATION  12/23/2019  HX ORTHOPAEDIC Left   
 carpal tunnel  HX TONSIL AND ADENOIDECTOMY  IR INSERT TUNL CVC W PORT OVER 5 YEARS  2/4/2020  
 removed  VASCULAR SURGERY PROCEDURE UNLIST    
 cabg x 3 with avr  
 
Social History Socioeconomic History  Marital status:  Spouse name: Not on file  Number of children: Not on file  Years of education: Not on file  Highest education level: Not on file Occupational History  Not on file Social Needs  Financial resource strain: Not on file  Food insecurity Worry: Not on file Inability: Not on file  Transportation needs Medical: Not on file Non-medical: Not on file Tobacco Use  Smoking status: Never Smoker  Smokeless tobacco: Never Used Substance and Sexual Activity  Alcohol use: Not Currently Alcohol/week: 0.0 standard drinks  Drug use: Never  Sexual activity: Not on file Lifestyle  Physical activity Days per week: Not on file Minutes per session: Not on file  Stress: Not on file Relationships  Social connections Talks on phone: Not on file Gets together: Not on file Attends Sabianism service: Not on file Active member of club or organization: Not on file Attends meetings of clubs or organizations: Not on file Relationship status: Not on file  Intimate partner violence Fear of current or ex partner: Not on file Emotionally abused: Not on file Physically abused: Not on file Forced sexual activity: Not on file Other Topics Concern  Not on file Social History Narrative  Not on file Family History Problem Relation Age of Onset  Lung Disease Mother   
     copd  Cancer Father   
     lympnode cancer  No Known Problems Brother  Cancer Other   
     fmh lymphoma  Diabetes Other   
     fmhx  Diabetes Maternal Grandfather Allergies Allergen Reactions  Heparin Other (comments) HIT antibody test strongly positive on 1/17/2020. SHARON drawn 1/18/2020 and resulted positive on 1/21/2020  Amoxicillin Rash  Keflex [Cephalexin] Rash  Pcn [Penicillins] Other (comments) \"felt closed in\". No rash Current Facility-Administered Medications Medication Dose Route Frequency  sodium chloride (NS) flush 5-40 mL  5-40 mL IntraVENous Q8H  
 sodium chloride (NS) flush 5-40 mL  5-40 mL IntraVENous PRN  
 acetaminophen (TYLENOL) tablet 650 mg  650 mg Oral Q4H PRN  
  HYDROcodone-acetaminophen (NORCO) 7.5-325 mg per tablet 1 Tab  1 Tab Oral Q4H PRN  
 morphine injection 2 mg  2 mg IntraVENous Q4H PRN  
 naloxone (NARCAN) injection 0.4 mg  0.4 mg IntraVENous PRN  
 diphenhydrAMINE (BENADRYL) capsule 25 mg  25 mg Oral Q4H PRN  
 ondansetron (ZOFRAN) injection 4 mg  4 mg IntraVENous Q4H PRN  
 magnesium hydroxide (MILK OF MAGNESIA) 400 mg/5 mL oral suspension 30 mL  30 mL Oral DAILY PRN  
 ALPRAZolam (XANAX) tablet 0.5 mg  0.5 mg Oral QHS PRN  
 buPROPion (WELLBUTRIN) tablet 75 mg  75 mg Oral BID  [START ON 5/15/2020] fludrocortisone (FLORINEF) tablet 0.1 mg  0.1 mg Oral DAILY  OLANZapine (ZyPREXA) tablet 5 mg  5 mg Oral QHS  mirtazapine (REMERON) tablet 15 mg  15 mg Oral QHS  [START ON 5/15/2020] potassium chloride (KLOR-CON) tablet 10 mEq  10 mEq Oral DAILY  [START ON 5/15/2020] sertraline (ZOLOFT) tablet 100 mg  100 mg Oral DAILY  [START ON 5/15/2020] tamsulosin (FLOMAX) capsule 0.4 mg  0.4 mg Oral DAILY  [START ON 5/15/2020] furosemide (LASIX) tablet 40 mg  40 mg Oral DAILY  insulin regular (NOVOLIN R, HUMULIN R) injection   SubCUTAneous AC&HS  [START ON 5/15/2020] midodrine (PROAMATINE) tablet 10 mg  10 mg Oral DAILY  warfarin (COUMADIN) tablet 5 mg  5 mg Oral QHS  albuterol-ipratropium (DUO-NEB) 2.5 MG-0.5 MG/3 ML  3 mL Nebulization QHS Objective:  
 
Vitals:  
 05/14/20 1426 05/14/20 1431 05/14/20 1436 05/14/20 1455 BP: 148/78 149/81 147/82 153/88 Pulse: 81 80 80 86 Resp: 19 17 18 20 Temp:    97.7 °F (36.5 °C) SpO2: 91% 91% 91% 91% PHYSICAL EXAM  
 
Gen:  the patient is well developed and in no acute distress on [facility] HOSPITAL HEENT:  Sclera clear, pupils equal, oral mucosa moist 
Lungs: CTA occasional crackles that resolve with deep inspiration Heart:  RRR without M,G,R 
Abd: soft and non-tender; with positive bowel sounds. Ext: warm without cyanosis. There is 1+ lower leg edema. Skin:  no jaundice or rashes, foot in dressing wounds Neuro: no gross neuro deficits Psychiatric:  alert and oriented x 3 Chest X-ray:   
Pending No results for input(s): WBC, HGB, HCT, PLT, INR, HGBEXT, HCTEXT, PLTEXT, INREXT in the last 72 hours. No results for input(s): NA, K, CL, GLU, CO2, BUN, CREA, MG, PHOS, CA, ALB, TBIL, GPT, SGOT, BNPP in the last 72 hours. No lab exists for component: TROIP No results for input(s): PH, PCO2, PO2, HCO3 in the last 72 hours. Assessment:  (Medical Decision Making) Patient Active Problem List  
Diagnosis Code  DM type 2 (diabetes mellitus, type 2) ([facility] E11.9  
 Gout M10.9  BPH (benign prostatic hyperplasia) N40.0  Seasonal allergic rhinitis J30.2  
 HLD (hyperlipidemia) E78.5  Obesity, morbid ([facility] E66.01  
 Type 2 diabetes with nephropathy ([facility]) E11.21  
 Bilateral carotid artery stenosis I65.23  
 CAD (coronary artery disease) I25.10  
 S/P CABG x 3 Z95.1  Status post aortic valve replacement Z95.2  Atrial fibrillation ([facility]) I48.91  
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  
 Hypotension I95.9  Acute renal failure on dialysis ([facility]) N17.9, Z99.2  DNR (do not resuscitate) [phone]  Long term (current) use of anticoagulants Z79.01  
 Pacemaker Z95.0  Gangrene of toe of both feet ([facility]) I96  
 Gangrenous toe ([facility] I96  
 PAD (peripheral artery disease) ([facility]) I73.9 Plan:  (Medical Decision Making) Hospital Problems  Date Reviewed: 5/4/2020 Codes Class Noted POA Gangrenous toe ([facility] ICD-10-CM: K17 
ICD-9-CM: 785.4  5/14/2020 Unknown Per Primary PAD (peripheral artery disease) ([facility]) ICD-10-CM: I73.9 ICD-9-CM: 443.9  5/14/2020 Unknown Hypoxia ICD-10-CM: R09.02 
ICD-9-CM: 799.02  5/14/2020 Unknown  Most likely due to atelectasis post op, resolving and mild, will order portable CXR, patient instructed to use IS q 2h while awake, continue supplemental O2 to keep O2 sat above 90% and wean O2 to RA as tolerated. WBC in AM  
  
 
 
Thank you very much for this referral.  We appreciate the opportunity to participate in this patient's care. Will follow along with above stated plan.  
 
Kash Biggs MD

## 2020-05-14 NOTE — BRIEF OP NOTE
Brief Postoperative Note Patient: Gay Musa YOB: 1945 MRN: [id number] Date of Procedure: 5/14/2020 Pre-Op Diagnosis: Necrosis ([address] [I96], Bilateral foot digital necrosis Post-Op Diagnosis: Same as preoperative diagnosis. Procedure(s): 
AMPUTATION RIGHT  1ST-4TH TOES AND LEFT 2ND - 3RD TOES Surgeon(s): 
Arehta Garcia MD 
 
Surgical Assistant: None Anesthesia: General  
 
Estimated Blood Loss (mL): less than 50 Complications: None Specimens:  
ID Type Source Tests Collected by Time Destination 1 : right 1-4 toes Toe Foot  Aretha Garcia MD 5/14/2020 1134 Discarded 2 : LEFT 2-3 TOES Toe Foot, left  Aretha Garcia MD 5/14/2020 1158 Discarded Implants: * No implants in log * Drains: * No LDAs found * Findings:  
 
Electronically Signed by German Castaneda MD on 5/14/2020 at 12:13 PM

## 2020-05-15 NOTE — DISCHARGE SUMMARY
[address] [facility] [address] [address] FAX: [phone] Physician Discharge Summary Patient: Ernestina Paget MRN: [id number]  SSN: [id number] YOB: 1945  Age: 69 y.o. Sex: male Admit Date: 5/14/2020 Discharge Date: 5/16/2020 Admitting Physician: Maggie Henderson MD  
 
Discharge Physician: Jojo Narayan NP Admission Diagnoses: Necrosis ([address] Sonjae Kamini PAD (peripheral artery disease) ([address] [I73.9] Gangrenous toe ([address] [place] Nicolegee Discharge Diagnoses:  
Problem List as of 5/15/2020 Date Reviewed: 5/4/2020 Codes Class Noted - Resolved Gangrenous toe ([address] ICD-10-CM: Y24 
ICD-9-CM: 785.4  5/14/2020 - Present PAD (peripheral artery disease) (HCC) ICD-10-CM: I73.9 ICD-9-CM: 443.9  5/14/2020 - Present Hypoxia ICD-10-CM: R09.02 
ICD-9-CM: 799.02  5/14/2020 - Present Gangrene of toe of both feet ([address] ICD-10-CM: M67 
ICD-9-CM: 785.4  5/11/2020 - Present Long term (current) use of anticoagulants ICD-10-CM: Z79.01 
ICD-9-CM: V58.61  4/27/2020 - Present Pacemaker ICD-10-CM: Z95.0 ICD-9-CM: V45.01  4/27/2020 - Present Overview Signed 5/6/2020  9:46 PM by Gene Yadav MD  
  Dual chamber Biotronik pacemaker placed (1/24/20): AV block post op AVR. 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 
ICD-9-CM: 427.31  1/13/2020 - Present Overview Signed 5/6/2020  9:46 PM by Gene Yadav MD  
  Post op CABG. CAD (coronary artery disease) (Chronic) ICD-10-CM: I25.10 ICD-9-CM: 414.00  1/10/2020 - Present Overview Signed 5/6/2020  1:19 PM by Belvie Goldberg, MD  
  CABG (1/16/20):  LIMA to LAD. SVG to OM. SVG to PDA. S/P CABG x 3 (Chronic) ICD-10-CM: Z95.1 ICD-9-CM: V45.81  1/10/2020 - Present Status post aortic valve replacement ICD-10-CM: Z95.2 ICD-9-CM: V43.3  1/10/2020 - Present Overview Addendum 5/6/2020  9:43 PM by Belvie Goldberg, MD  
  1. AVR (1/16/20):  25 mm Intuity Jon Gutierres Valve. 2.  Echo (4/12/20):  EF 55-60%. Normal functioning AVR. MG 15. PG 26. Mild MR. Bilateral carotid artery stenosis (Chronic) ICD-10-CM: E16.69 ICD-9-CM: 433.10, 433.30  11/24/2019 - Present Overview Signed 11/24/2019  8:06 PM by Belvie Goldberg, MD  
  1. Carotid Duplex (11/20/19): Bilateral 50-69% ICA stenosis. 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 
ICD-9-CM: 250.40, 583.81  8/26/2019 - Present Obesity, morbid ([address] (Chronic) ICD-10-CM: E66.01 
ICD-9-CM: 278.01  10/19/2018 - Present DM type 2 (diabetes mellitus, type 2) (HCC) (Chronic) ICD-10-CM: E11.9 ICD-9-CM: 250.00  1/14/2013 - Present Gout (Chronic) ICD-10-CM: M10.9 ICD-9-CM: 274.9  1/14/2013 - Present BPH (benign prostatic hyperplasia) (Chronic) ICD-10-CM: N40.0 ICD-9-CM: 600.00  1/14/2013 - Present Seasonal allergic rhinitis (Chronic) ICD-10-CM: J30.2 ICD-9-CM: 477.9  1/14/2013 - Present HLD (hyperlipidemia) (Chronic) ICD-10-CM: S22.1 ICD-9-CM: 272.4  1/14/2013 - Present RESOLVED: Acute on chronic respiratory failure with hypoxia (HCC) ICD-10-CM: J96.21 
ICD-9-CM: 518.84, 799.02  3/8/2020 - 5/6/2020 RESOLVED: Acute-on-chronic kidney injury ([facility] ICD-10-CM: N17.9, N18.9 ICD-9-CM: 584.9, 585.9  2/17/2020 - 2/25/2020 RESOLVED: Atelectasis ICD-10-CM: J98.11 ICD-9-CM: 518.0  2/17/2020 - 2/25/2020 RESOLVED: Fluid overload ICD-10-CM: E87.70 ICD-9-CM: 276.69  2/15/2020 - 2/25/2020 RESOLVED: Pneumonia due to infectious organism ICD-10-CM: J18.9 ICD-9-CM: 997  2/14/2020 - 5/6/2020 RESOLVED: Pleural effusion ICD-10-CM: J90 ICD-9-CM: 511.9  2/3/2020 - 2/25/2020 RESOLVED: Bilateral pneumothorax ICD-10-CM: J93.9 ICD-9-CM: 512.89  1/17/2020 - 2/18/2020 RESOLVED: Thrombocytopenia ([facility] ICD-10-CM: D69.6 ICD-9-CM: 287.5  1/17/2020 - 2/18/2020 RESOLVED: Shock ([facility] ICD-10-CM: R57.9 ICD-9-CM: 785.50  1/15/2020 - 2/18/2020 RESOLVED: Metabolic acidosis D-60-UC: E87.2 ICD-9-CM: 276.2  1/11/2020 - 1/16/2020 RESOLVED: Hyperkalemia ICD-10-CM: E87.5 ICD-9-CM: 276.7  1/11/2020 - 1/16/2020 RESOLVED: Aortic valve stenosis (Chronic) ICD-10-CM: I35.0 ICD-9-CM: 424.1  1/10/2020 - 5/6/2020 RESOLVED: Weaning from respirator [facility]) ICD-10-CM: Z99.11 ICD-9-CM: V46.13  1/10/2020 - 2/18/2020 RESOLVED: Acute hypoxemic respiratory failure ([facility] [facility] ICD-10-CM: J96.01 
ICD-9-CM: 518.81  1/10/2020 - 2/25/2020 RESOLVED: Nonrheumatic aortic valve stenosis (Chronic) ICD-10-CM: I35.0 ICD-9-CM: 424.1  4/13/2018 - 5/6/2020 Overview Addendum 5/6/2020  9:40 PM by rAaceli Alonso MD  
   
 
  
  
   
 RESOLVED: HTN (hypertension) ICD-10-CM: I10 
ICD-9-CM: 401.9  1/14/2013 - 2/25/2020 Procedures for this admission: Procedure(s): 
AMPUTATION RIGHT  1ST-4TH TOES AND LEFT 2ND - 3RD TOES Discharged Condition: stable Hospital Course: HPI: 69year-old underwent aortic valve surgery in January of this year that a long complicated inpatient course.  During that time he was noted to have gangrenous changes of the right and left first through fourth toes.  He presented to the office  for evaluation of his toes as well as his circulation.  The toes on the left foot #1 through 3 are have dry eschar down to the interphalangeal joints.  On the right first through third as well as a portion of the fourth toe are involved. Maye Martin does not have any evidence of secondary infection.  He denies any recent acute illness since his discharge from the hospital. 
  
He underwent 1. Right first through fourth toe amputation. 2.  Left second and third toe amputation by Dr. Pepper Siddiqui on 5/14/20. He was admitted post surgery for pain control and hypoxia. Pulmonary was consulted and following for worsening pulmonary edema. PO lasix and oxygen therapy will defer to pulmonary. Consults: Pulmonary/Critical Care Significant Diagnostic Studies: labs and microbiology Treatments: IV hydration, antibiotics: Ancef, analgesia: Norco Morphine and Tylenol, cardiac meds: midodrine, Florinef, anticoagulation: Warfarin, Insulin: Lantus and Novolin and surgery: see above Discharge Exam: Physical Exam:  GENERAL: alert, cooperative, no distress, appears stated age, LUNG:  diminished breath sounds R base, L base, HEART:  regular rate and rhythm, S1, S2 normal, no murmur, click, rub or gallop and WOUND:  right foot dressing in place, left foot dressing in place Disposition: Home INR CHECK ON TUES 5/19/20 @ 10:00. Continue home regimen of Warfarin. Wound Care: 
-Ok to wash incision lines with antibacterial soap and water, pat incision line dry, cover with clean, dry gauze and wrap with radha. Please perform daily. Keep legs elevated as much possible. HE IS ONLY TO BEAR WEIGHT TO HIS HEELS. Wedge shoe for the right foot and post-op shoe to the left. PO Lasix BID for 3 days. Follow-up with Pulmonary next week. Patient Instructions:  
Current Discharge Medication List  
  
START taking these medications Details  
oxyCODONE-acetaminophen (Percocet) 7.5-325 mg per tablet Take 1 Tab by mouth every four (4) hours as needed for Pain for up to 5 days. Max Daily Amount: 6 Tabs. Indications: pain 
Qty: 30 Tab, Refills: 0 Associated Diagnoses: Gangrene of toe of both feet ([facility] CONTINUE these medications which have CHANGED Details  
furosemide (LASIX) 40 mg tablet Take 1 Tab by mouth two (2) times a day. Take 1 tablet twice a day for 3 days then resume 1 tablet per day 
Qty: 90 Tab, Refills: 3 Associated Diagnoses: Edema, unspecified type CONTINUE these medications which have NOT CHANGED Details ALPRAZolam (XANAX) 0.5 mg tablet Take 1 Tab by mouth nightly as needed for Anxiety. Max Daily Amount: 0.5 mg. 
Qty: 30 Tab, Refills: 1 Associated Diagnoses: Current moderate episode of major depressive disorder without prior episode (HCC)  
  
sertraline (ZOLOFT) 100 mg tablet Take 1 Tab by mouth daily. Qty: 30 Tab, Refills: 1 Associated Diagnoses: Current moderate episode of major depressive disorder without prior episode ([facility] potassium chloride (KLOR-CON) 10 mEq tablet Take 1 Tab by mouth daily. Qty: 30 Tab, Refills: 6 Associated Diagnoses: Hypokalemia  
  
tamsulosin (Flomax) 0.4 mg capsule Take 1 Cap by mouth daily. Qty: 90 Cap, Refills: 3 Associated Diagnoses: Benign prostatic hyperplasia, unspecified whether lower urinary tract symptoms present OLANZapine (ZyPREXA) 5 mg tablet Take 1 Tab by mouth nightly. Qty: 90 Tab, Refills: 3 Associated Diagnoses: Current moderate episode of major depressive disorder without prior episode (HCC)  
  
mirtazapine (Remeron) 15 mg tablet Take 1 Tab by mouth nightly. Qty: 90 Tab, Refills: 3 Associated Diagnoses: Current moderate episode of major depressive disorder without prior episode (HCC)  
  
insulin glargine (Lantus Solostar U-100 Insulin) 100 unit/mL (3 mL) inpn 25 units sq qhs and 5 units sq qam 
Qty: 15 Pen, Refills: 3 Associated Diagnoses: Type 2 diabetes with nephropathy ([facility] buPROPion (WELLBUTRIN) 75 mg tablet Take 1 Tab by mouth two (2) times a day. Qty: 180 Tab, Refills: 3 Associated Diagnoses: Current moderate episode of major depressive disorder without prior episode (HCC)  
  
insulin aspart U-100 (NovoLOG) 100 unit/mL crtg Check sugar qac. Less than 200 no units, 201-250 2 units sq, 251-300 4 units sq, 301-350 6 units sq, 351-400 8 units sq, over 401 10 units sq. Qty: 15 Cartridge, Refills: 3 Associated Diagnoses: Type 2 diabetes with nephropathy ([facility] ! ! Jantoven 7.5 mg tablet 7.5 mg. Mon, Wed and Friday or as directed  
  
fludrocortisone (FLORINEF) 0.1 mg tablet 0.1 mg daily. 2 tabs po daily  
  
!! Jantoven 5 mg tablet 5 mg. Tues, Thurs and Sunday or as directed  
  
midodrine (PROAMATINE) 10 mg tablet   
  
 !! - Potential duplicate medications found. Please discuss with provider. STOP taking these medications  
  
 albuterol sulfate 2.5 mg/0.5 mL nebu 2.5 mg, ipratropium 0.02 % soln 0.5 mg Comments:  
Reason for Stopping:   
   
  
 
 
Reference discharge instructions as provided by nursing for diet, labs, medications, activity, wound care and any outpatient referrals. Follow-up Appointments Procedures  FOLLOW UP VISIT Appointment in: Two Weeks Standing Status:   Standing Number of Occurrences:   1 Order Specific Question:   Appointment in Answer: Two Weeks Signed: 
Jessy Randhawa NP 
5/15/2020 
5:03 PM 
 
Elements of this note have been dictated using speech recognition software. As a result, errors of speech recognition may have occurred.

## 2020-05-15 NOTE — PROGRESS NOTES
Dane Hernandez Admission Date: 5/14/2020 Daily Progress Note: 5/15/2020 The patient's chart is reviewed and the patient is discussed with the staff.  69 y.o.  male seen and evaluated at the request of Dr. Raya Mendez. 
Librado Marquez underwent aortic valve surgery in January of this year that a long complicated inpatient course.  During that time he was noted to have gangrenous changes of the right and left first through fourth toes.  He was now admitted for right first through fourth toe amputation and left second and third toe amputation. We were consulted post op for hypoxia. CXR shows edema. Home lasix has been on hold. Has home oxygen that he sleeps with. Subjective:  
  Was using mucinex and albuterol at home to help with recent congestion which has resolved. Feels short of breath. On lasix as taken at home. Current Facility-Administered Medications Medication Dose Route Frequency  warfarin (COUMADIN) tablet 7.5 mg  7.5 mg Oral Once per day on Mon Wed Fri Sat  [START ON 5/17/2020] warfarin (COUMADIN) tablet 5 mg  5 mg Oral Once per day on Sun Tue Thu  
 sodium chloride (NS) flush 5-40 mL  5-40 mL IntraVENous Q8H  
 sodium chloride (NS) flush 5-40 mL  5-40 mL IntraVENous PRN  
 acetaminophen (TYLENOL) tablet 650 mg  650 mg Oral Q4H PRN  
 morphine injection 2 mg  2 mg IntraVENous Q4H PRN  
 naloxone (NARCAN) injection 0.4 mg  0.4 mg IntraVENous PRN  
 ondansetron (ZOFRAN) injection 4 mg  4 mg IntraVENous Q4H PRN  
 magnesium hydroxide (MILK OF MAGNESIA) 400 mg/5 mL oral suspension 30 mL  30 mL Oral DAILY PRN  
 ALPRAZolam (XANAX) tablet 0.5 mg  0.5 mg Oral QHS PRN  
 buPROPion (WELLBUTRIN) tablet 75 mg  75 mg Oral BID  fludrocortisone (FLORINEF) tablet 0.1 mg  0.1 mg Oral DAILY  OLANZapine (ZyPREXA) tablet 5 mg  5 mg Oral QHS  mirtazapine (REMERON) tablet 15 mg  15 mg Oral QHS  potassium chloride (KLOR-CON) tablet 10 mEq  10 mEq Oral DAILY  sertraline (ZOLOFT) tablet 100 mg  100 mg Oral DAILY  tamsulosin (FLOMAX) capsule 0.4 mg  0.4 mg Oral DAILY  furosemide (LASIX) tablet 40 mg  40 mg Oral DAILY  insulin regular (NOVOLIN R, HUMULIN R) injection   SubCUTAneous AC&HS  
 albuterol-ipratropium (DUO-NEB) 2.5 MG-0.5 MG/3 ML  3 mL Nebulization QHS  
 HYDROcodone-acetaminophen (NORCO) 5-325 mg per tablet 1 Tab  1 Tab Oral Q4H PRN  
 diphenhydrAMINE (BENADRYL) capsule 25 mg  25 mg Oral Q6H PRN Objective:  
 
Vitals:  
 05/15/20 0210 05/15/20 0457 05/15/20 0726 05/15/20 1107 BP:  144/61 153/71 144/72 Pulse:  86 88 78 Resp:  18 18 18 Temp:  98 °F (36.7 °C) 98 °F (36.7 °C) 98.7 °F (37.1 °C) SpO2: 94% 93% 96% 93% Intake and Output:  
05/13 1901 - 05/15 0700 In: 4525 [P.O.:650; I.V.:700] Out: 267 [Urine:250] 05/15 0701 - 05/15 1900 In: -  
Out: 150 [Urine:150] Physical Exam:  
Constitutional:  the patient is well developed and in no acute distress HEENT:  Sclera clear, pupils equal, oral mucosa moist 
Lungs: decreased from posterior - especially in the left base. Looks short of breath with conversation. Wearing 3 liter cannula. Cardiovascular:  RRR without M,G,R 
Abd/GI: soft and non-tender; with positive bowel sounds. Ext: warm without cyanosis. There is some edema in his hands. Musculoskeletal: moves all four extremities with equal strength Skin:  no jaundice or rashes, both feet wrapped with ACE wraps Neuro: no gross neuro deficits Musculoskeletal: ? can ambulate - not witnessed. No deformity Psychiatric: Calm. Review of Systems - Review of Systems Constitutional: Negative. Respiratory: Positive for shortness of breath. Cardiovascular: Negative. Gastrointestinal: Negative. Lines: peripheral IV 
 
CHEST XRAY:  
  
 
LAB Recent Labs 05/15/20 
0449 05/14/20 
1602 WBC 8.7  --   
HGB 8.5*  --   
HCT 27.9*  --   
   --   
INR 1.7 2.0 Recent Labs 05/15/20 
1119   
K 3.9  CO2 33* * BUN 24* CREA 1.63* Assessment:  (Medical Decision Making) Hospital Problems  Date Reviewed: 5/4/2020 Codes Class Noted POA Gangrenous toe ([facility] ICD-10-CM: W68 
ICD-9-CM: 785.4  5/14/2020 Unknown S/p multiple amputations PAD (peripheral artery disease) ([facility]) ICD-10-CM: I73.9 ICD-9-CM: 443.9  5/14/2020 Unknown Hypoxia ICD-10-CM: R09.02 
ICD-9-CM: 799.02  5/14/2020 Unknown Remains on 3 liters oxygen. BNP elevated and CXR with edema. Plan:  (Medical Decision Making) 1. Will give IV lasix now and have him take Lasix BID at home for next three days and then resume 40 mg per day. Ask him to weigh himself daily at home. 2. RT to check room air sat now - he may need to use oxygen at home continuously until seen back by us in the office next week. Can go back to just using with sleep when daytime hypoxia resolves 3. Can stop mucinex and albuterol as recent congestion has resolved 4. Has chronic kidney disease which has improved Cullen Lal NP More than 50% of time documented was spent face-to-face contact with the patient and in the care of the patient on the floor/unit where the patient is located. Lungs:  Decrease BS Heart:  RRR with no Murmur/Rubs/Gallops Additional Comments:  He appears quite dyspneic with conversation, received one dose of 40 mg lasix. Was 85 % on RA and he is at home only on nocturnal 02. CXR with worsening pulmonary edema , will increase lasix and I would keep him another day and not discharge today especially I dont think he has the best support art home I have spoken with and examined the patient. I agree with the above assessment and plan as documented.  
 
Keyur Fonseca MD

## 2020-05-15 NOTE — PROGRESS NOTES
Per Rosi Escalera NP pulmonary pt discharge cancelled for today. Called to Dr Danielito Thomas service left mssg discharge postponed

## 2020-05-15 NOTE — PROGRESS NOTES
Initial visit by  to convey care and concern and encourage patient that  services are available if desired. Mr. Gill Able appeared asleep and I did not wake him. Chaplains remain available for support. Andrzej Quispe MDiv Board Certified [address] [place] [address]

## 2020-05-15 NOTE — PROGRESS NOTES
Warfarin dosing per pharmacist 
 
Ahmet Sessions is a 69 y.o. male. Indication:  Afib, Bioprosthetic Aortic valve replacement Goal INR:  2.5 to 3.5 Home dose:  5 mg Sun, Tu, Thu; 7.5 mg all other days Risk factors or significant drug interactions:  none Other anticoagulants:  N/A Daily Monitoring Date  INR     Warfarin dose HGB              Notes 5/14  2.0  5 mg   --- 
5/15  1.7  7.5 mg  8.5 Pharmacy consulted to assist managing warfarin in patient with history of Afib post CABG and bioprosthetic aortic valve replacement. INR on admission on 5/14/20 was subtherapeutic at 2.0, but this is because warfarin was held leading up to scheduled procedure with vascular surgery. Warfarin resumed after procedure on 5/14/20. INR today trending slightly down at 1.7. Per review of chart, appears vascular plans to discharge patient today after review from pulmonary. Would resume usual home dose for now with close outpatient follow up. Would consider the need to bridge patient with alternative anticoagulant for a few days as an outpatient until INR closer to therapeutic range. Pharmacy will continue to follow INR Thank you, 
Roberto Saeed, PharmD, BCPS Clinical Pharmacist 
[phone]

## 2020-05-15 NOTE — OP NOTES
[address] [place] [address] 
OPERATIVE REPORT Name:  Sinai Cruz 
MR#:  [id number] :  1945 ACCOUNT #:  [de-identified] DATE OF SERVICE:  2020 PREOPERATIVE DIAGNOSES:  Right first through fourth toe gangrene, left first through third toe gangrene. POSTOPERATIVE DIAGNOSES:  Right first through fourth toe gangrene, left first through third toe gangrene. PROCEDURES PERFORMED: 
1. Right first through fourth toe amputation. 2.  Left second and third toe amputation. SURGEON:  Kevin Grimm MD 
 
ASSISTANT:  None. ANESTHESIA:  LMA. COMPLICATIONS:  None. SPECIMENS REMOVED:  None. IMPLANTS:  None. ESTIMATED BLOOD LOSS:  Less than 50 mL. DRAINS:  None. DESCRIPTION OF PROCEDURE:  The patient was brought to the operating room and placed on the operative table in supine position. Following adequate LMA anesthesia and time-out procedure, the feet were draped and prepped in sterile fashion bilaterally. Initially, an elliptical incision was made around the base of the first toe. Wound was deepened using Bovie to control bleeding. The dissection was carried down to the level of bone. Bone was then transected. The residual proximal phalanx was then debrided back to the articular surface of the first metatarsal and the arterial service was debrided as well. Next, attention was turned to the second, third and fourth toes. Each of which had gangrenous change down to the midportion of the proximal phalanx. At this point, an elliptical incision was made around the base of the second, third and fourth toes. The wound was carried down to the bone. The bone was exposed using periosteal elevator. Next, the second, third, and fourth phalanges were divided with a bone cutter and removed from the operative field. Next, the residual phalanges were debrided back to their articular surface of the metatarsal heads.   With this completed, the wounds were irrigated with antibiotic solution. Hemostasis was achieved. The wound was then closed in layers with Vicryl suture followed by interrupted nylon sutures. Next, attention was turned to the left foot. The eschar on the first toe on the left only incorporated proximally a third of the plantar aspect of the first toe and it appeared to be viable. A decision was made to not remove this toe. Next, the second and third toes demonstrate gangrene to the interphalangeal joints. Elliptical incisions were made around each toe just proximal to the area of dry gangrene. The bone was then divided and the distal tips of the toes were removed. The proximal phalanges were then debrided back and then the wounds were irrigated and closed with nylon sutures. Sterile dry dressings were applied to both feet. The patient was then awakened from anesthesia and transported to the recovery room in stable addition. The patient tolerated the procedure well. No complications. MD KIM [id number] 
D:  05/14/2020 13:42 
T:  05/14/2020 20:24 
JOB #:  [id number]

## 2020-05-15 NOTE — PROGRESS NOTES
LMSW spoke with pt and then placed a call to Jignesh GRANT Shown who pt reports is coordinating his care for him. Sadiq Swathi verified that pt went from [facility] to [facility] then home about 3weeks ago. [facility] is following pt for Rn, PT and OT. Pt has a walker and home O2 with portable tanks in place. Family anticipates pt return home with their continued support at discharge. DIL denied any other particular concerns at this time. Will resume pt's [facility] at discharge. Care Management Interventions PCP Verified by CM: Avelino Vallejo MD) Mode of Transport at Discharge: (family) Transition of Care Consult (CM Consult): Home Health [address]: No 
Reason Outside [place]: Patient already serviced by other home care/hospice agency(active with [facility]) Discharge Durable Medical Equipment: Yes(pt home oxygen and a walker. A prosthetic shose has been ordered by surgery.) Physical Therapy Consult: No 
Occupational Therapy Consult: No 
Speech Therapy Consult: No 
Current Support Network: Lives with Spouse, Family Lives [place] Confirm Follow Up Transport: Family The Plan for Transition of Care is Related to the Following Treatment Goals : Pt will need continued [facility] services for recovery The Patient and/or Patient Representative was Provided with a Choice of Provider and Agrees with the Discharge Plan?: Yes Name of the Patient Representative Who was Provided with a Choice of Provider and Agrees with the Discharge Plan: pt/daughter in law Freedom of Choice List was Provided with Basic Dialogue that Supports the Patient's Individualized Plan of Care/Goals, Treatment Preferences and Shares the Quality Data Associated with the Providers?: Yes [employer] Information Provided?: No 
Discharge Location Discharge Placement: Home with home health

## 2020-05-15 NOTE — PROGRESS NOTES
[address], [address] FAX: [phone] Vascular Surgery Progress Note Admit Date: 2020 POD 1 Day Post-Op Procedure:  Procedure(s): 
AMPUTATION RIGHT  1ST-4TH TOES AND LEFT 2ND - 3RD TOES Subjective:  
 
Patient has no new complaints. Objective:  
 
Blood pressure 153/71, pulse 88, temperature 98 °F (36.7 °C), resp. rate 18, SpO2 96 %. Temp (24hrs), Av.9 °F (36.6 °C), Min:97.4 °F (36.3 °C), Max:99 °F (37.2 °C) Physical Exam:  GENERAL: alert, cooperative, no distress, appears stated age, LUNG:  diminished breath sounds R base, L base, HEART:  regular rate and rhythm, S1, S2 normal, no murmur, click, rub or gallop and WOUND:  right foot dressing in place, left foot dressing in place Labs:  
Recent Labs 05/15/20 
0449 HGB 8.5* WBC 8.7 Data Review: images and reports reviewed Current Facility-Administered Medications Medication Dose Route Frequency  sodium chloride (NS) flush 5-40 mL  5-40 mL IntraVENous Q8H  
 sodium chloride (NS) flush 5-40 mL  5-40 mL IntraVENous PRN  
 acetaminophen (TYLENOL) tablet 650 mg  650 mg Oral Q4H PRN  
 morphine injection 2 mg  2 mg IntraVENous Q4H PRN  
 naloxone (NARCAN) injection 0.4 mg  0.4 mg IntraVENous PRN  
 ondansetron (ZOFRAN) injection 4 mg  4 mg IntraVENous Q4H PRN  
 magnesium hydroxide (MILK OF MAGNESIA) 400 mg/5 mL oral suspension 30 mL  30 mL Oral DAILY PRN  
 ALPRAZolam (XANAX) tablet 0.5 mg  0.5 mg Oral QHS PRN  
 buPROPion (WELLBUTRIN) tablet 75 mg  75 mg Oral BID  fludrocortisone (FLORINEF) tablet 0.1 mg  0.1 mg Oral DAILY  OLANZapine (ZyPREXA) tablet 5 mg  5 mg Oral QHS  mirtazapine (REMERON) tablet 15 mg  15 mg Oral QHS  potassium chloride (KLOR-CON) tablet 10 mEq  10 mEq Oral DAILY  sertraline (ZOLOFT) tablet 100 mg  100 mg Oral DAILY  tamsulosin (FLOMAX) capsule 0.4 mg  0.4 mg Oral DAILY  furosemide (LASIX) tablet 40 mg  40 mg Oral DAILY  insulin regular (NOVOLIN R, HUMULIN R) injection   SubCUTAneous AC&HS  warfarin (COUMADIN) tablet 5 mg  5 mg Oral QPM  
 albuterol-ipratropium (DUO-NEB) 2.5 MG-0.5 MG/3 ML  3 mL Nebulization QHS  
 HYDROcodone-acetaminophen (NORCO) 5-325 mg per tablet 1 Tab  1 Tab Oral Q4H PRN  
 diphenhydrAMINE (BENADRYL) capsule 25 mg  25 mg Oral Q6H PRN Assessment:  
 
Active Problems: 
  Gangrenous toe ([facility] [address] (5/14/2020) [facility] (5/14/2020) Hypoxia (5/14/2020) Plan/Recommendations/Medical Decision Making:  
 
Continue present treatment D/C home once Pulmonary rounds-may need supplemental O2 Will need to f/u next week with cardiology for INR check Signed By: Martha Vivas NP May 15, 2020

## 2020-05-16 NOTE — PROGRESS NOTES
Patient is up for discharge home today. Patient will be resuming HH with [facility] for RN/PT/OT. Patient does not have any other needs at this time. CM faxed information to [facility]. Care Management Interventions PCP Verified by CM: Sachi Capellan MD) Mode of Transport at Discharge: (family) Transition of Care Consult (CM Consult): Home Health([facility]) [address]: No 
Reason Outside [place]: Patient already serviced by other home care/hospice agency(active with [facility]) Discharge Durable Medical Equipment: Yes(pt home oxygen and a walker. A prosthetic shose has been ordered by surgery.) Physical Therapy Consult: No 
Occupational Therapy Consult: No 
Speech Therapy Consult: No 
Current Support Network: Lives with Spouse, Family Lives [place] Confirm Follow Up Transport: Family The Plan for Transition of Care is Related to the Following Treatment Goals : Pt will need continued [facility] services for recovery The Patient and/or Patient Representative was Provided with a Choice of Provider and Agrees with the Discharge Plan?: Yes Name of the Patient Representative Who was Provided with a Choice of Provider and Agrees with the Discharge Plan: pt/daughter in law Freedom of Choice List was Provided with Basic Dialogue that Supports the Patient's Individualized Plan of Care/Goals, Treatment Preferences and Shares the Quality Data Associated with the Providers?: Yes [employer] Information Provided?: No 
Discharge Location Discharge Placement: Home with home health([facility])

## 2020-05-16 NOTE — PROGRESS NOTES
Warfarin dosing per pharmacist 
 
Meryle Billings is a 69 y.o. male. Indication:  Afib, Bioprosthetic Aortic valve replacement Goal INR:  2.5 to 3.5 Home dose:  5 mg Sun, Tu, Thu; 7.5 mg all other days Risk factors or significant drug interactions:  none Other anticoagulants:  N/A Daily Monitoring Date  INR     Warfarin dose HGB              Notes 5/14  2.0  5 mg   --- 
5/15  1.7  7.5 mg  8.5 
5/16  1.8  7.5 mg  --- Pharmacy consulted to assist managing warfarin in patient with history of Afib post CABG and bioprosthetic aortic valve replacement. INR on admission on 5/14/20 was subtherapeutic at 2.0, but this is because warfarin was held leading up to scheduled procedure with vascular surgery. Warfarin resumed after procedure on 5/14/20. INR today trending slightly up at 1.8. Continue usual home dose for now, giving 7.5 mg today, with close outpatient follow up. Would consider the need to bridge patient with alternative anticoagulant for a few days as an outpatient until INR closer to therapeutic range. Pharmacy will continue to follow INR Thank you, Moy Chang, PharmD, [facility] Clinical Pharmacy Specialist 
[phone]

## 2020-05-16 NOTE — DISCHARGE INSTRUCTIONS
Patient Education        Learning About Meal Planning for Diabetes  Why plan your meals? Meal planning can be a key part of managing diabetes. Planning meals and snacks with the right balance of carbohydrate, protein, and fat can help you keep your blood sugar at the target level you set with your doctor. You don't have to eat special foods. You can eat what your family eats, including sweets once in a while. But you do have to pay attention to how often you eat and how much you eat of certain foods. You may want to work with a dietitian or a certified diabetes educator. He or she can give you tips and meal ideas and can answer your questions about meal planning. This health professional can also help you reach a healthy weight if that is one of your goals. What plan is right for you? Your dietitian or diabetes educator may suggest that you start with the plate format or carbohydrate counting. The plate format  The plate format is a simple way to help you manage how you eat. You plan meals by learning how much space each food should take on a plate. Using the plate format helps you spread carbohydrate throughout the day. It can make it easier to keep your blood sugar level within your target range. It also helps you see if you're eating healthy portion sizes. To use the plate format, you put non-starchy vegetables on half your plate. Add meat or meat substitutes on one-quarter of the plate. Put a grain or starchy vegetable (such as brown rice or a potato) on the final quarter of the plate. You can add a small piece of fruit and some low-fat or fat-free milk or yogurt, depending on your carbohydrate goal for each meal.  Here are some tips for using the plate format:  Make sure that you are not using an oversized plate. A 9-inch plate is best. Many restaurants use larger plates. Get used to using the plate format at home. Then you can use it when you eat out.   Write down your questions about using the plate format. Talk to your doctor, a dietitian, or a diabetes educator about your concerns. Carbohydrate counting  With carbohydrate counting, you plan meals based on the amount of carbohydrate in each food. Carbohydrate raises blood sugar higher and more quickly than any other nutrient. It is found in desserts, breads and cereals, and fruit. It's also found in starchy vegetables such as potatoes and corn, grains such as rice and pasta, and milk and yogurt. Spreading carbohydrate throughout the day helps keep your blood sugar levels within your target range. Your daily amount depends on several things, including your weight, how active you are, which diabetes medicines you take, and what your goals are for your blood sugar levels. A registered dietitian or diabetes educator can help you plan how much carbohydrate to include in each meal and snack. A guideline for your daily amount of carbohydrate is:  45 to 60 grams at each meal. That's about the same as 3 to 4 carbohydrate servings. 15 to 20 grams at each snack. That's about the same as 1 carbohydrate serving. The Nutrition Facts label on packaged foods tells you how much carbohydrate is in a serving of the food. First, look at the serving size on the food label. Is that the amount you eat in a serving? All of the nutrition information on a food label is based on that serving size. So if you eat more or less than that, you'll need to adjust the other numbers. Total carbohydrate is the next thing you need to look for on the label. If you count carbohydrate servings, one serving of carbohydrate is 15 grams. For foods that don't come with labels, such as fresh fruits and vegetables, you'll need a guide that lists carbohydrate in these foods. Ask your doctor, dietitian, or diabetes educator about books or other nutrition guides you can use.   If you take insulin, you need to know how many grams of carbohydrate are in a meal. This lets you know how much rapid-acting insulin to take before you eat. If you use an insulin pump, you get a constant rate of insulin during the day. So the pump must be programmed at meals to give you extra insulin to cover the rise in blood sugar after meals. When you know how much carbohydrate you will eat, you can take the right amount of insulin. Or, if you always use the same amount of insulin, you need to make sure that you eat the same amount of carbohydrate at meals. If you need more help to understand carbohydrate counting and food labels, ask your doctor, dietitian, or diabetes educator. How do you get started with meal planning? Here are some tips to get started:  Plan your meals a week at a time. Don't forget to include snacks too. Use cookbooks or online recipes to plan several main meals. Plan some quick meals for busy nights. You also can double some recipes that freeze well. Then you can save half for other busy nights when you don't have time to cook. Make sure you have the ingredients you need for your recipes. If you're running low on basic items, put these items on your shopping list too. List foods that you use to make breakfasts, lunches, and snacks. List plenty of fruits and vegetables. Post this list on the refrigerator. Add to it as you think of more things you need. Take the list to the store to do your weekly shopping. Follow-up care is a key part of your treatment and safety. Be sure to make and go to all appointments, and call your doctor if you are having problems. It's also a good idea to know your test results and keep a list of the medicines you take. Where can you learn more? Go to http://aditi-gigi.info/  Enter C846 in the search box to learn more about \"Learning About Meal Planning for Diabetes. \"  Current as of: December 19, 2019Content Version: 12.4  © 7959-4290 Healthwise, Incorporated.   Care instructions adapted under license by [employer] (which disclaims liability or warranty for this information). If you have questions about a medical condition or this instruction, always ask your healthcare professional. [place] [address] any warranty or liability for your use of this information. Per Finn Hayes NP: Pt may remove dressing in 48 hours, may wash feet in antibacterial soap. Do not soak/ take tub bath. Please replace dressing with guaze and tape and keep incisions clean, dry, and intact. May be weight bearing if walking on heels, right foot with have wedge shoe on while ambulating for support and left foot will have post-op shoe on while ambulating for support. Wedge shoe will be delivered to patients house today, 5/14/20. Until then, pt may ambulate with post-op shoes on bilateral feet. ACTIVITY  · As tolerated and as directed by your doctor. · You may shower in 24 hours. Do not take a bath until cleared by MD.     DIET  · Clear liquids until no nausea or vomiting, then light diet for the first day. · Advance to regular diet on second day, unless your doctor orders otherwise. · If nausea and vomiting continues, call your doctor. PAIN  · Take pain medication as directed by your doctor. · Call your doctor if pain is NOT relieved by medication. CALL YOUR DOCTOR IF   · Excessive bleeding that does not stop after holding pressure over the area. · Temperature of 101 degrees F or above. · Excessive redness, swelling or bruising, and/or green or yellow, smelly discharge from incision. After general anesthesia or intravenous sedation, for 24 hours or while taking prescription Narcotics:  · Limit your activities  · A responsible adult needs to be with you for the next 24 hours  · Do not drive and operate hazardous machinery  · Do not make important personal or business decisions  · Do not drink alcoholic beverages  · If you have not urinated within 8 hours after discharge, please contact your surgeon on call.   · If you have sleep apnea and have a CPAP machine, please use it for all naps and sleeping. · Please use caution when taking narcotics and any of your home medications that may cause drowsiness. *  Please give a list of your current medications to your Primary Care Provider. *  Please update this list whenever your medications are discontinued, doses are      changed, or new medications (including over-the-counter products) are added. *  Please carry medication information at all times in case of emergency situations. These are general instructions for a healthy lifestyle:  No smoking/ No tobacco products/ Avoid exposure to second hand smoke  Surgeon General's Warning:  Quitting smoking now greatly reduces serious risk to your health. Obesity, smoking, and sedentary lifestyle greatly increases your risk for illness  A healthy diet, regular physical exercise & weight monitoring are important for maintaining a healthy lifestyle    You may be retaining fluid if you have a history of heart failure or if you experience any of the following symptoms:  Weight gain of 3 pounds or more overnight or 5 pounds in a week, increased swelling in our hands or feet or shortness of breath while lying flat in bed. Please call your doctor as soon as you notice any of these symptoms; do not wait until your next office visit.

## 2020-05-16 NOTE — ROUTINE PROCESS
Bedside and Verbal report given to Al, RN by self. Report included SBAR, Kardex, ED summary, procedure summary, recent results and cardiac rhythm non-monitored.

## 2020-05-16 NOTE — PROGRESS NOTES
Deion Lopez Admission Date: 5/14/2020 Daily Progress Note: 5/16/2020 The patient's chart is reviewed and the patient is discussed with the staff.  69 y.o.  male seen and evaluated at the request of Dr. Villegas. 
Carla Keller underwent aortic valve surgery in January of this year that a long complicated inpatient course.  During that time he was noted to have gangrenous changes of the right and left first through fourth toes.  He was now admitted for right first through fourth toe amputation and left second and third toe amputation. We were consulted post op for hypoxia. CXR shows edema. Home lasix has been on hold. Has home oxygen that he sleeps with. Subjective:  
Says he did not sleep all night ,that he was peeing after lasix Wants to go home Still on 4L NC 
 
Current Facility-Administered Medications Medication Dose Route Frequency  warfarin (COUMADIN) tablet 7.5 mg  7.5 mg Oral Once per day on Mon Wed Fri Sat  [START ON 5/17/2020] warfarin (COUMADIN) tablet 5 mg  5 mg Oral Once per day on Sun Tue Thu  
 furosemide (LASIX) injection 40 mg  40 mg IntraVENous BID  midodrine (PROAMATINE) tablet 5 mg  5 mg Oral BID  insulin glargine (LANTUS) injection 25 Units  25 Units SubCUTAneous QHS  sodium chloride (NS) flush 5-40 mL  5-40 mL IntraVENous Q8H  
 sodium chloride (NS) flush 5-40 mL  5-40 mL IntraVENous PRN  
 acetaminophen (TYLENOL) tablet 650 mg  650 mg Oral Q4H PRN  
 morphine injection 2 mg  2 mg IntraVENous Q4H PRN  
 naloxone (NARCAN) injection 0.4 mg  0.4 mg IntraVENous PRN  
 ondansetron (ZOFRAN) injection 4 mg  4 mg IntraVENous Q4H PRN  
 magnesium hydroxide (MILK OF MAGNESIA) 400 mg/5 mL oral suspension 30 mL  30 mL Oral DAILY PRN  
 ALPRAZolam (XANAX) tablet 0.5 mg  0.5 mg Oral QHS PRN  
 buPROPion (WELLBUTRIN) tablet 75 mg  75 mg Oral BID  fludrocortisone (FLORINEF) tablet 0.1 mg  0.1 mg Oral DAILY  OLANZapine (ZyPREXA) tablet 5 mg  5 mg Oral QHS  mirtazapine (REMERON) tablet 15 mg  15 mg Oral QHS  potassium chloride (KLOR-CON) tablet 10 mEq  10 mEq Oral DAILY  sertraline (ZOLOFT) tablet 100 mg  100 mg Oral DAILY  tamsulosin (FLOMAX) capsule 0.4 mg  0.4 mg Oral DAILY  furosemide (LASIX) tablet 40 mg  40 mg Oral DAILY  insulin regular (NOVOLIN R, HUMULIN R) injection   SubCUTAneous AC&HS  
 HYDROcodone-acetaminophen (NORCO) 5-325 mg per tablet 1 Tab  1 Tab Oral Q4H PRN  
 diphenhydrAMINE (BENADRYL) capsule 25 mg  25 mg Oral Q6H PRN Objective:  
 
Vitals:  
 05/16/20 4043 05/16/20 2102 05/16/20 0426 05/16/20 9842 BP:   163/86 146/82 Pulse:   93 (!) 101 Resp:   22 20 Temp:   98.8 °F (37.1 °C) 98.2 °F (36.8 °C) SpO2: (!) 88% 91% 92% 92% Intake and Output:  
05/14 1901 - 05/16 0700 In: 240 [P.O.:240] Out: 4177 [QRSWX:0352] No intake/output data recorded. Physical Exam:  
Constitutional:  the patient is well developed and in no acute distress HEENT:  Sclera clear, pupils equal, oral mucosa moist 
Lungs: decreased from posterior - especially in the left base. Looks short of breath with conversation. Wearing 3 liter cannula. Cardiovascular:  RRR without M,G,R 
Abd/GI: soft and non-tender; with positive bowel sounds. Ext: warm without cyanosis. There is some edema in his hands. Musculoskeletal: moves all four extremities with equal strength Skin:  no jaundice or rashes, both feet wrapped with ACE wraps Neuro: no gross neuro deficits Musculoskeletal: ? can ambulate - not witnessed. No deformity Psychiatric: Calm. Review of Systems - Review of Systems Constitutional: Negative. Respiratory: Positive for shortness of breath. Cardiovascular: Negative. Gastrointestinal: Negative. Lines: peripheral IV 
 
CHEST XRAY:  
  
 
LAB Recent Labs 05/16/20 
0838 05/15/20 
2166 05/14/20 
1602 WBC  --  8.7  --   
HGB  --  8.5*  --   
 HCT  --  27.9*  --   
PLT  --  200  --   
INR 1.8 1.7 2.0 Recent Labs 05/15/20 
1119   
K 3.9  CO2 33* * BUN 24* CREA 1.63* Assessment:  (Medical Decision Making) Hospital Problems  Date Reviewed: 5/4/2020 Codes Class Noted POA Gangrenous toe ([address] ICD-10-CM: V62 
ICD-9-CM: 785.4  5/14/2020 Unknown S/p multiple amputations PAD (peripheral artery disease) (HCC) ICD-10-CM: I73.9 ICD-9-CM: 443.9  5/14/2020 Unknown Hypoxia ICD-10-CM: R09.02 
ICD-9-CM: 799.02  5/14/2020 Unknown Remains on 3 liters oxygen. BNP elevated and CXR with edema. Plan:  (Medical Decision Making)  
- Ok to go home 
-will need 02 -has at home ,has concentrator but he does not want portable tank as he does not go anywhere right now -  Continue home lasix 
  follow up in our office Joceline Stein MD

## 2020-05-16 NOTE — PROGRESS NOTES
Problem: Falls - Risk of 
Goal: *Absence of Falls Description: Document Femi Sol Fall Risk and appropriate interventions in the flowsheet. Outcome: Progressing Towards Goal 
Note: Fall Risk Interventions: 
Mobility Interventions: Bed/chair exit alarm, Communicate number of staff needed for ambulation/transfer, Patient to call before getting OOB, PT Consult for mobility concerns, PT Consult for assist device competence, OT consult for ADLs, Utilize walker, cane, or other assistive device Medication Interventions: Bed/chair exit alarm, Patient to call before getting OOB, Teach patient to arise slowly Elimination Interventions: Call light in reach, Patient to call for help with toileting needs Patient progressing towards goal with no falls on current admission. Patient without confusion or agitation. Patient has not ambulated on current shift. Personal belongings are within reach. Bed is in the low and locked position with side rails up x2. Call light within reach and patient verbalizes understanding of use. Problem: Amputation Goal: *Progressive mobility and function Outcome: Progressing Towards Goal 
Note: Patient turned himself frequently in bed on current shift. Goal: *Optimal pain control at patient's stated goal 
Outcome: Progressing Towards Goal 
Note: Patient has denied pain on current shift. Goal: *Absence of infection signs and symptoms Outcome: Progressing Towards Goal 
Note: Patient has been afebrile on current shift and without s/sx of infection.

## 2020-05-16 NOTE — ROUTINE PROCESS
Bedside and Verbal report given to self by Ayush Brunner RN. Report included SBAR, Kardex, ED Summary, Procedure Summary, Intake and Output and Cardiac Rhythm non-monitored.

## 2020-05-18 NOTE — PROGRESS NOTES
Patient contacted regarding recent discharge and COVID-19 risk Care Transition Nurse/ Ambulatory Care Manager contacted the patient by telephone to perform post discharge assessment. Verified name and  with patient as identifiers. Patient has following risk factors of: acute respiratory failure and chronic kidney disease. CTN/ACM reviewed discharge instructions, medical action plan and red flags related to discharge diagnosis. Reviewed and educated them on any new and changed medications related to discharge diagnosis. Advised obtaining a 90-day supply of all daily and as-needed medications. Education provided regarding infection prevention, and signs and symptoms of COVID-19 and when to seek medical attention with patient who verbalized understanding. Discussed exposure protocols and quarantine from [address] you at higher risk for severe illness  and given an opportunity for questions and concerns. The patient agrees to contact the COVID-19 hotline [phone] or PCP office for questions related to their healthcare. CTN/ACM provided contact information for future reference. From CDC: Are you at higher risk for severe illness?  Wash your hands often.  Avoid close contact (6 feet, which is about two arm lengths) with people who are sick.  Put distance between yourself and other people if COVID-19 is spreading in your community.  Clean and disinfect frequently touched surfaces.  Avoid all cruise travel and non-essential air travel.  Call your healthcare professional if you have concerns about COVID-19 and your underlying condition or if you are sick. For more information on steps you can take to protect yourself, see CDC's How to Protect Yourself Patient/family/caregiver given information for [employer] and agrees to enroll no Patient's preferred e-mail:  [email or web address] 
Patient's preferred phone number: Michel Dorado 
 Based on Loop alert triggers, patient will be contacted by nurse care manager for worsening symptoms. Plan for follow-up call in 7-14 days based on severity of symptoms and risk factors.

## 2020-05-24 PROBLEM — A41.9 SEVERE SEPSIS (HCC): Status: ACTIVE | Noted: 2020-01-01

## 2020-05-24 PROBLEM — N17.9 AKI (ACUTE KIDNEY INJURY) (HCC): Status: ACTIVE | Noted: 2020-01-01

## 2020-05-24 PROBLEM — R65.20 SEVERE SEPSIS (HCC): Status: ACTIVE | Noted: 2020-01-01

## 2020-05-24 PROBLEM — E87.20 LACTIC ACIDOSIS: Status: ACTIVE | Noted: 2020-01-01

## 2020-05-24 PROBLEM — M86.9 OSTEOMYELITIS (HCC): Status: ACTIVE | Noted: 2020-01-01

## 2020-05-24 PROBLEM — I50.9 ACUTE CHF (CONGESTIVE HEART FAILURE) (HCC): Status: ACTIVE | Noted: 2020-01-01

## 2020-05-24 NOTE — H&P
Hospitalist H&P Note Admit Date:  2020  4:04 PM  
Name:  Kim Rutherford Age:  69 y.o. 
:  1945 MRN:  [id number] PCP:  Reena Philippe MD 
Treatment Team: Attending Provider: Linette Griffin MD; Consulting Provider: Lisa Liz MD 
 
HPI:  
Patient history was obtained from the ER provider prior to seeing the patient. Chief complaint: Chills, dyspnea, fever, hypotension HPI: Morbidly obese patient on chronic 2-3 L home O2 recently discharged from this hospital by Dr. 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 Patient had aortic valve replacement with a bioprosthetic aortic valve and postop was noted to have gangrenous toes and has significant peripheral vascular disease. Longstanding diabetes with diabetic nephropathy chronic kidney disease stage III with May 8 serum creatinine being 1.5. Chronic anticoagulation with warfarin for paroxysmal A. fib and recently aortic valve replacement but bioprosthetic. He has a permanent pacemaker, status post coronary artery bypass graft, bilateral carotid stenosis, and recent diagnosis of heparin-induced thrombocytopenia. He did see cardiology Friday2 days ago and claims medications were changed but he cannot tell me which ones. He cannot tell me the dose of his warfarin. I do have a discharge summary and reviewed MAR. He apparently was on the toilet and had near syncope, EMS was notified. Patient reports symptoms of dyspnea dyspnea on exertion chills and EMS reported documented fever but I could not locate documentation. On arrival to emergency department he is found to have a brain natruretic peptide of 4134 with recent prior being 2600 May 15. And chest radiographic appearance of bilateral alveolar edema consistent with acute pulmonary edema/CHF.   He has preserved LVEF on his recent echocardiogram to evaluate his bioprosthetic aortic valve. His EKG is read as sinus tach although P waves are not easily identified his rhythm is regular. He has a history of paroxysmal A. fib chronically. His serum lactate level is 3 he has acute kidney injury with serum creatinine 2.0 his glucose is 200. His hemoglobin is 8 white blood count is 15,000 with left shift. His ABG is not bad with pH of 7.37 PCO2 of 47 PaO2 of 67 on 3 L 92% but clinical decompensation since that time according to nursing in the ER. He is currently unable to speak complete sentences and pulse ox is below 90 on nasal cannula and blood pressure is borderline. I spoke with ER nurses and Turner catheter and 40 mg IV Lasix to be ordered. He is being admitted to the intensive care unit with severe sepsis with lactic acidosis and acute kidney injury likely osteomyelitis source has bilateral feet are malodorous. Imaging studies consistent with possible osteomyelitis. He also has acute pulmonary edema and borderline hypotension. He will be admitted to the unit despite DNR status due to requested use of BiPAP if needed, and possible pressor support. For now we will continue vancomycin as single agent but will seek opinion of intensivist and will consult ID, cardiology, vascular, wound care. Pharmacy will manage warfarinstat PT INR pending. Other significant lab data includes procalcitonin level at 8. He was not tested for COVID-19difficult to obtain adequate history due to patient respiratory distress at time of my evaluation. It is noted that he was discharged with Midrin as well as Florinef. Florinef will obviously be held but we may try Midrin to avoid need for pressor support. Will await evaluation from intensivist/critical care. Call placed 10 systems reviewed and negative except as noted in HPI. Past Medical History:  
Diagnosis Date  Arthritis  BPH (benign prostatic hyperplasia) 1/14/2013  CAD (coronary artery disease) CABG- 1-2020  DM type 2 (diabetes mellitus, type 2) ([address] dx 2004 Type 2, avg fbs 250 recognizes hypo at 66,  Dyspnea   
 at times, Uses Albuterol inhaler when needed (last used first of aug 2014)  Gout 1/14/2013  HLD (hyperlipidemia) 1/14/2013  
 HTN (hypertension)   
 controlled with meds  Morbid obesity ([address] 9/3/14 BMI 41.7  Oxygen dependent 2.5 liters mainly at night, as needed  Psychiatric disorder   
 anxiety, controlled with buspar  Rheumatic fever   
 as a child  Seasonal allergic rhinitis   
 treated with Allegra  Severe aortic valve stenosis   
 echo 09/11/19 EF 60-65%- mild to moderate regurgitation  Thyroid disease   
 hx- no longer takes synthroid  Unspecified sleep apnea 2016  
 no bipap at this time Past Surgical History:  
Procedure Laterality Date  HX CATARACT REMOVAL Bilateral 2012  HX HEART CATHETERIZATION  12/23/2019  HX ORTHOPAEDIC Left   
 carpal tunnel  HX TONSIL AND ADENOIDECTOMY  IR INSERT TUNL CVC W PORT OVER 5 YEARS  2/4/2020  
 removed  VASCULAR SURGERY PROCEDURE UNLIST    
 cabg x 3 with avr Allergies Allergen Reactions  Heparin Other (comments) HIT antibody test strongly positive on 1/17/2020. SHARON drawn 1/18/2020 and resulted positive on 1/21/2020  Amoxicillin Rash  Keflex [Cephalexin] Rash  Pcn [Penicillins] Other (comments) \"felt closed in\". No rash, pt reports having it since without reaction Social History Tobacco Use  Smoking status: Never Smoker  Smokeless tobacco: Never Used Substance Use Topics  Alcohol use: Not Currently Alcohol/week: 0.0 standard drinks Family History Problem Relation Age of Onset  Lung Disease Mother   
     copd  Cancer Father   
     lympnode cancer  No Known Problems Brother  Cancer Other   
     fmh lymphoma  Diabetes Other   
     fmhx  Diabetes Maternal Grandfather Immunization History Administered Date(s) Administered  Influenza High Dose Vaccine PF 2017, 2018, 10/02/2019  Influenza Vaccine 2012, 10/18/2013, 2014, 2015, 10/03/2016  Pneumococcal Conjugate (PCV-13) 2015  Pneumococcal Polysaccharide (PPSV-23) 10/03/2016  TB Skin Test (PPD) Intradermal 01/10/2020, 2020  Td, Adsorbed PF 2016  Zoster Recombinant 10/02/2019  Zoster Vaccine, Live 2016 PTA Medications: 
Prior to Admission Medications Prescriptions Last Dose Informant Patient Reported? Taking? ALPRAZolam (XANAX) 0.5 mg tablet   No No  
Sig: Take 1 Tab by mouth nightly as needed for Anxiety. Max Daily Amount: 0.5 mg.  
Jantoven 5 mg tablet   No No  
Sig: Take 1 Tab by mouth daily. Janalyn Shake and  or as directed Jantoven 7.5 mg tablet   No No  
Sig: Take 1 Tab by mouth daily. Mon, Wed and Friday or as directed OLANZapine (ZyPREXA) 5 mg tablet   No No  
Sig: Take 1 Tab by mouth nightly. buPROPion (WELLBUTRIN) 75 mg tablet   No No  
Sig: Take 1 Tab by mouth two (2) times a day. fludrocortisone (FLORINEF) 0.1 mg tablet   Yes No  
Si.1 mg daily. 2 tabs po daily  
furosemide (LASIX) 40 mg tablet   No No  
Sig: Take 1 Tab by mouth two (2) times a day. Take 1 tablet twice a day for 3 days then resume 1 tablet per day  
insulin aspart U-100 (NovoLOG) 100 unit/mL crtg   No No  
Sig: Check sugar qac. Less than 200 no units, 201-250 2 units sq, 251-300 4 units sq, 301-350 6 units sq, 351-400 8 units sq, over 401 10 units sq. insulin glargine (Lantus Solostar U-100 Insulin) 100 unit/mL (3 mL) inpn   No No  
Si units sq qhs and 5 units sq qam  
midodrine (PROAMATINE) 10 mg tablet   Yes No  
mirtazapine (Remeron) 15 mg tablet   No No  
Sig: Take 1 Tab by mouth nightly. potassium chloride (KLOR-CON) 10 mEq tablet   No No  
Sig: Take 1 Tab by mouth daily. sertraline (ZOLOFT) 100 mg tablet   No No  
Sig: Take 1 Tab by mouth daily. tamsulosin (Flomax) 0.4 mg capsule   No No  
Sig: Take 1 Cap by mouth daily. Facility-Administered Medications: None Objective:  
 
Patient Vitals for the past 24 hrs: 
 Temp Pulse Resp BP SpO2  
05/24/20 1859  (!) 101 (!) 31 96/54 98 % 05/24/20 1855  100 24 95/50 100 % 05/24/20 1850  (!) 103 (!) 34 101/57 97 % 05/24/20 1848  100  103/62   
05/24/20 1845  (!) 104 25 103/58 100 % 05/24/20 1830  99 28 99/58 99 % 05/24/20 1820  100 (!) 32 96/51 99 % 05/24/20 1809  99 (!) 32 98/55 98 % 05/24/20 1755  100 28 92/52 98 % 05/24/20 1750  100 30 92/52 98 % 05/24/20 1730  100 (!) 31 95/52 98 % 05/24/20 1720  (!) 105 24 95/51 (!) 83 % 05/24/20 1655  (!) 103 (!) 37 93/51 94 % 05/24/20 1650  (!) 104 30 90/54 93 % 05/24/20 1615    91/47   
05/24/20 1551 100.2 °F (37.9 °C) (!) 114 20 91/51 97 % Oxygen Therapy O2 Sat (%): 98 % (05/24/20 1859) Pulse via Oximetry: 102 beats per minute (05/24/20 1859) O2 Device: Nasal cannula (05/24/20 1551) O2 Flow Rate (L/min): 3 l/min (05/24/20 1551) No intake or output data in the 24 hours ending 05/24/20 1905 Physical Exam: 
General:    Distressedsignificant dyspnea at rest 
Eyes:   Normal sclera. Extraocular movements intact. ENT:  Normocephalic, atraumatic. Moist mucous membranes Neck:  Positive JVD positive HJR 
CV:   Regular but tachycardic at time of my exam not documented. I discerned gallop. Suspect S3 gallop+1 pretibial pitting edema. Lungs:  Rales throughout bilateral with poor air movement Abdomen: Soft, nontender, distended, positive HJR Extremities: Malodorous amputation sites and remaining gangrenous toeamputation sites malodorous but no obvious exudative drainage Neurologic: Moves all extremities no focal deficits, no pronator drift upper extremities I reviewed the labs, imaging, EKGs, telemetry, and other studies done this admission. Data Review:  
Recent Results (from the past 24 hour(s)) EKG, 12 LEAD, INITIAL Collection Time: 05/24/20  3:52 PM  
Result Value Ref Range Ventricular Rate 114 BPM  
 Atrial Rate 115 BPM  
 P-R Interval 172 ms QRS Duration 88 ms Q-T Interval 326 ms  
 QTC Calculation (Bezet) 449 ms Calculated P Axis 24 degrees Calculated R Axis 2 degrees Calculated T Axis 90 degrees Diagnosis    
  !! AGE AND GENDER SPECIFIC ECG ANALYSIS !! Sinus tachycardia Nonspecific ST and T wave abnormality Abnormal ECG When compared with ECG of 13-FEB-2020 15:31, 
QRS duration has decreased CBC WITH AUTOMATED DIFF Collection Time: 05/24/20  4:04 PM  
Result Value Ref Range WBC 15.6 (H) 4.3 - 11.1 K/uL  
 RBC 2.97 (L) 4.23 - 5.6 M/uL HGB 8.1 (L) 13.6 - 17.2 g/dL HCT 26.0 (L) 41.1 - 50.3 % MCV 87.5 79.6 - 97.8 FL  
 MCH 27.3 26.1 - 32.9 PG  
 MCHC 31.2 (L) 31.4 - 35.0 g/dL  
 RDW 15.3 (H) 11.9 - 14.6 % PLATELET 523 178 - 935 K/uL MPV 10.3 9.4 - 12.3 FL ABSOLUTE NRBC 0.00 0.0 - 0.2 K/uL NEUTROPHILS 93 (H) 43 - 78 % LYMPHOCYTES 2 (L) 13 - 44 % MONOCYTES 3 (L) 4.0 - 12.0 % EOSINOPHILS 0 (L) 0.5 - 7.8 % BASOPHILS 0 0.0 - 2.0 % IMMATURE GRANULOCYTES 2 0.0 - 5.0 %  
 ABS. NEUTROPHILS 14.5 (H) 1.7 - 8.2 K/UL  
 ABS. LYMPHOCYTES 0.3 (L) 0.5 - 4.6 K/UL  
 ABS. MONOCYTES 0.5 0.1 - 1.3 K/UL  
 ABS. EOSINOPHILS 0.0 0.0 - 0.8 K/UL  
 ABS. BASOPHILS 0.0 0.0 - 0.2 K/UL  
 ABS. IMM. GRANS. 0.3 0.0 - 0.5 K/UL  
 RBC COMMENTS SLIGHT 
HYPOCHROMIA 
    
 RBC COMMENTS MODERATE 
STOMATOCYTES 
    
 WBC COMMENTS Result Confirmed By Smear DF AUTOMATED METABOLIC PANEL, COMPREHENSIVE Collection Time: 05/24/20  4:04 PM  
Result Value Ref Range Sodium 136 136 - 145 mmol/L Potassium 4.0 3.5 - 5.1 mmol/L Chloride 100 98 - 107 mmol/L  
 CO2 28 21 - 32 mmol/L Anion gap 8 7 - 16 mmol/L Glucose 200 (H) 65 - 100 mg/dL BUN 30 (H) 8 - 23 MG/DL Creatinine 2.08 (H) 0.8 - 1.5 MG/DL  
 GFR est AA 40 (L) >60 ml/min/1.73m2 GFR est non-AA 33 (L) >60 ml/min/1.73m2 Calcium 8.0 (L) 8.3 - 10.4 MG/DL Bilirubin, total 0.5 0.2 - 1.1 MG/DL  
 ALT (SGPT) 20 12 - 65 U/L  
 AST (SGOT) 24 15 - 37 U/L Alk. phosphatase 139 (H) 50 - 136 U/L Protein, total 7.0 6.3 - 8.2 g/dL Albumin 1.9 (L) 3.2 - 4.6 g/dL Globulin 5.1 (H) 2.3 - 3.5 g/dL A-G Ratio 0.4 (L) 1.2 - 3.5 LACTIC ACID Collection Time: 05/24/20  4:04 PM  
Result Value Ref Range Lactic acid 3.0 (HH) 0.4 - 2.0 MMOL/L  
PROCALCITONIN Collection Time: 05/24/20  4:04 PM  
Result Value Ref Range Procalcitonin 8.17 ng/mL NT-PRO BNP Collection Time: 05/24/20  4:04 PM  
Result Value Ref Range NT pro-BNP 4,134 (H) 5 - 125 PG/ML  
C REACTIVE PROTEIN, QT Collection Time: 05/24/20  4:04 PM  
Result Value Ref Range C-Reactive protein 20.5 (H) 0.0 - 0.9 mg/dL POC G3 Collection Time: 05/24/20  5:12 PM  
Result Value Ref Range Device: NASAL CANNULA pH (POC) 7.372 7.35 - 7.45    
 pCO2 (POC) 47.2 (H) 35 - 45 MMHG  
 pO2 (POC) 67 (L) 75 - 100 MMHG  
 HCO3 (POC) 27.4 (H) 22 - 26 MMOL/L  
 sO2 (POC) 92 (L) 95 - 98 % Base excess (POC) 2 mmol/L Allens test (POC) YES Site RIGHT RADIAL Specimen type (POC) ARTERIAL Performed by Choco   
 CO2, POC 29 MMOL/L Flow rate (POC) 3.000 L/min COLLECT TIME 1,709 URINALYSIS W/ RFLX MICROSCOPIC Collection Time: 05/24/20  6:20 PM  
Result Value Ref Range Color YELLOW Appearance CLEAR Specific gravity 1.013 1.001 - 1.023    
 pH (UA) 7.0 5.0 - 9.0 Protein 30 (A) NEG mg/dL Glucose Negative NEG mg/dL Ketone Negative NEG mg/dL Bilirubin Negative NEG Blood Negative NEG Urobilinogen 1.0 0.2 - 1.0 EU/dL Nitrites Negative NEG Leukocyte Esterase TRACE (A) NEG    
 WBC 0-3 0 /hpf  
 RBC 10-20 0 /hpf Epithelial cells 0-3 0 /hpf Bacteria 0 0 /hpf Yeast OCCASIONAL All Micro Results Procedure Component Value Units Date/Time CULTURE, URINE [[id number]] Order Status:  Sent Specimen:  Cath Urine CULTURE, Francisco Kelch STAIN [[id number]] Order Status:  Sent Specimen:  Wound from Foot CULTURE, BLOOD [[id number]] Collected:  05/24/20 1604 Order Status:  Completed Specimen:  Blood Updated:  05/24/20 162 CULTURE, BLOOD [[id number]] Collected:  05/24/20 1604 Order Status:  Completed Specimen:  Blood Updated:  05/24/20 1621 Other Studies: 
Xr Foot Rt Ap/lat Result Date: 5/24/2020 Right foot CLINICAL INDICATION: Right foot swelling and erythema, recent toe amputations FINDINGS: Two views of the right foot show amputation across the MTP joints of the first through fourth toes. There are small bone fragments in the amputation plane with irregular soft tissue swelling and thickening noted distally at the level the amputation. There is irregularity along the bony cortices of the first, second, and third metatarsal heads. Osteomyelitis cannot be excluded by plain radiography. The fifth toe is unremarkable. IMPRESSION: Soft tissue swelling and irregularity along the amputation plane of the right first through fourth toes. Osteomyelitis is a consideration. Consider further evaluation with MRI to better evaluate for osteomyelitis. Xr Chest [facility] Result Date: 5/24/2020 Portable chest x-ray CLINICAL INDICATION: Dyspnea FINDINGS: Single AP view of the chest compared to a similar exam dated 5/15/2020 shows stable bilateral pulmonary edema pattern. A pacer device is in place. Postcardiac valve replacement changes evident. Small bilateral pleural effusions suspected. IMPRESSION: Acute CHF exacerbation. Assessment and Plan:  
 
Hospital Problems as of 5/24/2020 Date Reviewed: 5/4/2020 Codes Class Noted - Resolved POA Lactic acidosis ICD-10-CM: E87.2 ICD-9-CM: 276.2  5/24/2020 - Present Unknown Osteomyelitis ([facility] ICD-10-CM: M86.9 ICD-9-CM: 730.20  5/24/2020 - Present Unknown Acute CHF (congestive heart failure) ([facility]) ICD-10-CM: I50.9 ICD-9-CM: 428.0  5/24/2020 - Present Unknown * (Principal) Severe sepsis ([facility] ICD-10-CM: A41.9, R65.20 ICD-9-CM: 038.9, 995.92  5/24/2020 - Present Unknown DEJUAN (acute kidney injury) ([facility] ICD-10-CM: N17.9 ICD-9-CM: 584.9  5/24/2020 - Present Unknown Gangrenous toe ([facility] ICD-10-CM: U78 
ICD-9-CM: 785.4  5/14/2020 - Present Yes PAD (peripheral artery disease) ([facility]) ICD-10-CM: I73.9 ICD-9-CM: 443.9  5/14/2020 - Present Yes Hypoxia ICD-10-CM: R09.02 
ICD-9-CM: 799.02  5/14/2020 - Present Yes Gangrene of toe of both feet ([facility] ICD-10-CM: F20 
ICD-9-CM: 785.4  5/11/2020 - Present Yes Long term (current) use of anticoagulants ICD-10-CM: Z79.01 
ICD-9-CM: V58.61  4/27/2020 - Present Yes Pacemaker ICD-10-CM: Z95.0 ICD-9-CM: V45.01  4/27/2020 - Present Yes Overview Signed 5/6/2020  9:46 PM by Malissa Vidales MD  
  Dual chamber Biotronik pacemaker placed (1/24/20): AV block post op AVR. Hypotension (Chronic) ICD-10-CM: I95.9 ICD-9-CM: 458.9  2/18/2020 - Present Unknown HIT (heparin-induced thrombocytopenia) (HCC) (Chronic) ICD-10-CM: N84.38 ICD-9-CM: 289.84  1/23/2020 - Present Yes Atrial fibrillation (HCC) (Chronic) ICD-10-CM: I48.91 
ICD-9-CM: 427.31  1/13/2020 - Present Yes Overview Signed 5/6/2020  9:46 PM by Malissa Vidales MD  
  Post op CABG. CAD (coronary artery disease) (Chronic) ICD-10-CM: I25.10 ICD-9-CM: 414.00  1/10/2020 - Present Yes Overview Signed 5/6/2020  1:19 PM by Malissa Vidales MD  
  CABG (1/16/20):  LIMA to LAD. SVG to OM. SVG to PDA. S/P CABG x 3 (Chronic) ICD-10-CM: Z95.1 ICD-9-CM: V45.81  1/10/2020 - Present Yes Status post aortic valve replacement ICD-10-CM: Z95.2 ICD-9-CM: V43.3  1/10/2020 - Present Yes Overview Addendum 5/6/2020  9:43 PM by Aida Gtz MD  
  1. AVR (1/16/20):  25 mm Intuity Jon Gutierres Valve. 2.  Echo (4/12/20):  EF 55-60%. Normal functioning AVR. MG 15. PG 26. Mild MR. 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 
ICD-9-CM: 250.40, 583.81  8/26/2019 - Present Yes Obesity, morbid ([address] (Chronic) ICD-10-CM: E66.01 
ICD-9-CM: 278.01  10/19/2018 - Present Yes Gout (Chronic) ICD-10-CM: M10.9 ICD-9-CM: 274.9  1/14/2013 - Present Yes  
   
 BPH (benign prostatic hyperplasia) (Chronic) ICD-10-CM: N40.0 ICD-9-CM: 600.00  1/14/2013 - Present Yes HLD (hyperlipidemia) (Chronic) ICD-10-CM: E28.9 ICD-9-CM: 272.4  1/14/2013 - Present Yes PLAN: 
--Severe sepsis with acute kidney injury and lactic acidosis believed to be from osteomyelitis feet source. Culture sent from ER, DNR status but hypotensive and unable to administer fluid bolus due to pulmonary edema with hypoxemia. May need pressor support. We will try home Midrin. Seek opinion from intensivist.  Vancomycin , quinolone and clindamycin for now re: dm/polymicrobial coverage and true pcn allergy. --Acute congestive heart failure with preserved ejection fractionmay be related to sepsis. Will attempt IV diuresisTurner catheter. BiPap support. Consult cardiology. Serial troponins but no ischemic findings on EKG which suggest regular rhythm suspect sinus mechanism. History of paroxysmal A. fib. Continue warfarin with stat PT/INR pending pharmacy to manage. Avoid heparin with heparin-induced thrombocytopenia history. --Diabetic footsliding scale insulin coverage for now may need to restart home Lantus. --Significant comorbiditiessee extensive problem list.  Continue home meds as appropriateMAR reviewed and discharge medication list from 9 days ago reviewed. I spoke with son Deborah Portillo and updated on dad's status.  He informed tana Edgar was dc'd by dr Jodi Fitzgerald Friday. He agrees to bipap and Possible pressors prn. Linette Zarate Son [phone] Judie Minor Daughter-in-Law [phone] Discharge planning: Probable skilled nursing facility placement versus other DVT ppx: Warfarin therapy and SCDs Code status: DNR Decision Maker: Caden Parker is hcpoa Admit status: Inpatient ICU Estimated LOS: Greater than 2 midnights Risk:  high Signed: 
Feli Atkins DO

## 2020-05-24 NOTE — PROGRESS NOTES
Pharmacokinetic Consult to Pharmacist 
 
Rudy Christianson is a 69 y.o. male being treated for SSTI-  with Vanc. Height: 5' 10\" (177.8 cm)  Weight: 113.4 kg (250 lb) Lab Results Component Value Date/Time BUN 30 (H) 05/24/2020 04:04 PM  
 Creatinine 2.08 (H) 05/24/2020 04:04 PM  
 WBC 15.6 (H) 05/24/2020 04:04 PM  
 Procalcitonin 8.17 05/24/2020 04:04 PM  
 Lactic acid 3.0 (HH) 05/24/2020 04:04 PM  
  
Estimated Creatinine Clearance: 39.9 mL/min (A) (based on SCr of 2.08 mg/dL (H)). CULTURES: 
BC- pending, Wound culture - pending Lab Results Component Value Date/Time Vancomycin,trough 4.6 (L) 04/07/2020 02:06 PM  
 Vancomycin, random 12.5 04/10/2020 05:46 AM  
 
 
Day 1 of vancomycin. Goal trough is 15-20mcg/ml. Will give vanc 1.75gm maintenance dose of vanc q24hr. Pt received vanc 2gm in ER. Pt is at risk for accumulation due to BMI >30. Pharmacy to monitor vanc and adjust per protocol. Will continue to follow patient. Thank you for this consult, Britt Cook, PharmD

## 2020-05-24 NOTE — ED NOTES
I&O cath for urine, approx 10-15 mL UOP. Dr. Margarita Velasquez notified, stated continue IVF.  Per Rt O2 increased to 5L NC.

## 2020-05-24 NOTE — ED TRIAGE NOTES
Pt arrives via EMS. Pt had episode while on the toilet of near passing out. Pt was diaphoretic and feeling very weak. , oral T 102. 3. pt arrives on NRB, pt wears 3-4 L O2 at home. Pt reports recent bhatt in BP meds, states when he woke up today he \"didn't know which way was up\" pt states he had recent removal of toes.  Surgical dressing intact to BLE

## 2020-05-24 NOTE — ED PROVIDER NOTES
Patient presents the ER complaining of fatigue and weakness. Patient states symptoms started over the past couple days. Reports chills last evening. Reports some generalized shortness of breath as well. EMS reports patient was febrile in route. Blood pressure in the 80s. Was given IV fluids in route. Patient voices some concerns of increased drainage from right foot wound. Is status post recent surgery for gangrene with toe amputations. The history is provided by the patient. Fatigue This is a new problem. The current episode started more than 2 days ago. The problem has been gradually worsening. Patient reports a subjective fever - was not measured. Associated symptoms include shortness of breath. Pertinent negatives include no vomiting, no confusion and no nausea. Past Medical History:  
Diagnosis Date  Arthritis  BPH (benign prostatic hyperplasia) 1/14/2013  CAD (coronary artery disease) CABG- 1-2020  DM type 2 (diabetes mellitus, type 2) ([facility] [address] dx 2004 Type 2, avg fbs 250 recognizes hypo at 67,  Dyspnea   
 at times, Uses Albuterol inhaler when needed (last used first of aug 2014)  Gout 1/14/2013  HLD (hyperlipidemia) 1/14/2013  
 HTN (hypertension)   
 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   
 anxiety, controlled with buspar  Rheumatic fever   
 as a child  Seasonal allergic rhinitis   
 treated with Allegra  Severe aortic valve stenosis   
 echo 09/11/19 EF 60-65%- mild to moderate regurgitation  Thyroid disease   
 hx- no longer takes synthroid  Unspecified sleep apnea 2016  
 no bipap at this time Past Surgical History:  
Procedure Laterality Date  HX CATARACT REMOVAL Bilateral 2012  HX HEART CATHETERIZATION  12/23/2019  HX ORTHOPAEDIC Left   
 carpal tunnel  HX TONSIL AND ADENOIDECTOMY  IR INSERT TUNL CVC W PORT OVER 5 YEARS  2/4/2020  
 removed  VASCULAR SURGERY PROCEDURE UNLIST    
 cabg x 3 with avr Family History:  
Problem Relation Age of Onset  Lung Disease Mother   
     copd  Cancer Father   
     lympnode cancer  No Known Problems Brother  Cancer Other   
     fmh lymphoma  Diabetes Other   
     fmhx  Diabetes Maternal Grandfather Social History Socioeconomic History  Marital status:  Spouse name: Not on file  Number of children: Not on file  Years of education: Not on file  Highest education level: Not on file Occupational History  Not on file Social Needs  Financial resource strain: Not on file  Food insecurity Worry: Not on file Inability: Not on file  Transportation needs Medical: Not on file Non-medical: Not on file Tobacco Use  Smoking status: Never Smoker  Smokeless tobacco: Never Used Substance and Sexual Activity  Alcohol use: Not Currently Alcohol/week: 0.0 standard drinks  Drug use: Never  Sexual activity: Not on file Lifestyle  Physical activity Days per week: Not on file Minutes per session: Not on file  Stress: Not on file Relationships  Social connections Talks on phone: Not on file Gets together: Not on file Attends Restorationism service: Not on file Active member of club or organization: Not on file Attends meetings of clubs or organizations: Not on file Relationship status: Not on file  Intimate partner violence Fear of current or ex partner: Not on file Emotionally abused: Not on file Physically abused: Not on file Forced sexual activity: Not on file Other Topics Concern  Not on file Social History Narrative  Not on file ALLERGIES: Heparin; Amoxicillin; Keflex [cephalexin]; and Pcn [penicillins] Review of Systems Constitutional: Positive for chills, fatigue and fever. HENT: Negative for congestion and dental problem. Eyes: Negative for redness and visual disturbance. Respiratory: Positive for shortness of breath. Negative for chest tightness. Gastrointestinal: Negative for abdominal pain, nausea and vomiting. Genitourinary: Negative for flank pain, frequency and genital sores. Musculoskeletal: Negative for joint swelling and myalgias. Skin: Positive for wound. Neurological: Negative for weakness, light-headedness and numbness. Psychiatric/Behavioral: Negative for confusion. All other systems reviewed and are negative. Vitals:  
 05/24/20 1551 BP: 91/51 Pulse: (!) 114 Resp: 20 Temp: 100.2 °F (37.9 °C) SpO2: 97% Weight: 113.4 kg (250 lb) Height: 5' 10\" (1.778 m) Physical Exam 
Vitals signs and nursing note reviewed. Constitutional:   
   Appearance: Normal appearance. He is normal weight. HENT:  
   Head: Normocephalic. Neck: Musculoskeletal: Normal range of motion and neck supple. Cardiovascular:  
   Rate and Rhythm: Normal rate and regular rhythm. Pulses: Normal pulses. Heart sounds: Normal heart sounds. Pulmonary:  
   Effort: Pulmonary effort is normal.  
   Breath sounds: Normal breath sounds. Abdominal:  
   General: Abdomen is flat. Bowel sounds are normal.  
   Palpations: Abdomen is soft. Musculoskeletal: Normal range of motion. General: No swelling or tenderness. Skin: 
   General: Skin is warm and dry. Capillary Refill: Capillary refill takes less than 2 seconds. Coloration: Skin is not jaundiced. Neurological:  
   General: No focal deficit present. Mental Status: He is alert. Mental status is at baseline. MDM Number of Diagnoses or Management Options Diabetic foot infection [facility]):  
Osteomyelitis of right foot, unspecified type [facility]):  
Sepsis with acute renal failure and septic shock, due to unspecified organism, unspecified acute renal failure type [facility]):  
Diagnosis management comments: Concern for foot infection as well as sepsis. 6:00 PM 
White blood cell count elevated at 15,000. Hemoglobin stable. Chemistry panel shows a mild DEJUAN, creatinine up to 2 from 1.8. Elevated lactic acid and elevated procalcitonin. X-ray of foot is suspicious for osteomyelitis. Patient has been treated with fluids, vancomycin initiated. Will discuss case with hospitalist for admission Amount and/or Complexity of Data Reviewed Clinical lab tests: ordered and reviewed Tests in the radiology section of CPT®: ordered and reviewed Risk of Complications, Morbidity, and/or Mortality Presenting problems: high Diagnostic procedures: moderate Management options: high Critical Care Total time providing critical care: 30-74 minutes (Critical Care Time 70 Minutes: Excluding all billable procedures, time spent at the bedside directing patients resucsitation, updating family, and coordinating care with consultants 
) Patient Progress Patient progress: stable Procedures Results Include: 
 
Recent Results (from the past 24 hour(s)) EKG, 12 LEAD, INITIAL Collection Time: 05/24/20  3:52 PM  
Result Value Ref Range Ventricular Rate 114 BPM  
 Atrial Rate 115 BPM  
 P-R Interval 172 ms QRS Duration 88 ms Q-T Interval 326 ms  
 QTC Calculation (Bezet) 449 ms Calculated P Axis 24 degrees Calculated R Axis 2 degrees Calculated T Axis 90 degrees Diagnosis    
  !! AGE AND GENDER SPECIFIC ECG ANALYSIS !! Sinus tachycardia Nonspecific ST and T wave abnormality Abnormal ECG When compared with ECG of 13-FEB-2020 15:31, 
QRS duration has decreased CBC WITH AUTOMATED DIFF Collection Time: 05/24/20  4:04 PM  
Result Value Ref Range WBC 15.6 (H) 4.3 - 11.1 K/uL  
 RBC 2.97 (L) 4.23 - 5.6 M/uL HGB 8.1 (L) 13.6 - 17.2 g/dL HCT 26.0 (L) 41.1 - 50.3 % MCV 87.5 79.6 - 97.8 FL  
 MCH 27.3 26.1 - 32.9 PG  
 MCHC 31.2 (L) 31.4 - 35.0 g/dL  
 RDW 15.3 (H) 11.9 - 14.6 % PLATELET 816 800 - 556 K/uL MPV 10.3 9.4 - 12.3 FL ABSOLUTE NRBC 0.00 0.0 - 0.2 K/uL DF PENDING   
METABOLIC PANEL, COMPREHENSIVE Collection Time: 05/24/20  4:04 PM  
Result Value Ref Range Sodium 136 136 - 145 mmol/L Potassium 4.0 3.5 - 5.1 mmol/L Chloride 100 98 - 107 mmol/L  
 CO2 28 21 - 32 mmol/L Anion gap 8 7 - 16 mmol/L Glucose 200 (H) 65 - 100 mg/dL BUN 30 (H) 8 - 23 MG/DL Creatinine 2.08 (H) 0.8 - 1.5 MG/DL  
 GFR est AA 40 (L) >60 ml/min/1.73m2 GFR est non-AA 33 (L) >60 ml/min/1.73m2 Calcium 8.0 (L) 8.3 - 10.4 MG/DL Bilirubin, total 0.5 0.2 - 1.1 MG/DL  
 ALT (SGPT) 20 12 - 65 U/L  
 AST (SGOT) 24 15 - 37 U/L Alk. phosphatase 139 (H) 50 - 136 U/L Protein, total 7.0 6.3 - 8.2 g/dL Albumin 1.9 (L) 3.2 - 4.6 g/dL Globulin 5.1 (H) 2.3 - 3.5 g/dL A-G Ratio 0.4 (L) 1.2 - 3.5 LACTIC ACID Collection Time: 05/24/20  4:04 PM  
Result Value Ref Range Lactic acid 3.0 (HH) 0.4 - 2.0 MMOL/L  
PROCALCITONIN Collection Time: 05/24/20  4:04 PM  
Result Value Ref Range Procalcitonin 8.17 ng/mL POC G3 Collection Time: 05/24/20  5:12 PM  
Result Value Ref Range Device: NASAL CANNULA pH (POC) 7.372 7.35 - 7.45    
 pCO2 (POC) 47.2 (H) 35 - 45 MMHG  
 pO2 (POC) 67 (L) 75 - 100 MMHG  
 HCO3 (POC) 27.4 (H) 22 - 26 MMOL/L  
 sO2 (POC) 92 (L) 95 - 98 % Base excess (POC) 2 mmol/L Allens test (POC) YES Site RIGHT RADIAL Specimen type (POC) ARTERIAL Performed by Choco   
 CO2, POC 29 MMOL/L Flow rate (POC) 3.000 L/min COLLECT TIME 1,709 Voice dictation software was used during the making of this note. This software is not perfect and grammatical and other typographical errors may be present. This note has been proofread, but may still contain errors. Barbie Seats, MD; 5/24/2020 @6:01 PM  
===================================================================

## 2020-05-25 PROBLEM — I73.9 PAD (PERIPHERAL ARTERY DISEASE) (HCC): Chronic | Status: ACTIVE | Noted: 2020-01-01

## 2020-05-25 PROBLEM — J81.1 PULMONARY EDEMA, NONCARDIAC: Status: ACTIVE | Noted: 2020-01-01

## 2020-05-25 PROBLEM — J96.11 CHRONIC RESPIRATORY FAILURE WITH HYPOXIA (HCC): Chronic | Status: ACTIVE | Noted: 2020-01-01

## 2020-05-25 PROBLEM — Z95.0 PACEMAKER: Chronic | Status: ACTIVE | Noted: 2020-01-01

## 2020-05-25 NOTE — CONSULTS
Patient seen and examined. Patient admitted for respiratory issues currently with pulmonary edema. Status post toe amputation with Dr. Virgen Swain several weeks ago. Currently patient is septic on pressors with elevated white count. Clinically he is surgical site sutures are stable with drainage around the first toe amputation. I asked the patient about possibly surgery to remove his foot, and that he would have to resend his DNR status. He stated he did not want a more surgery and did not want he put to sleep. I had a long discussion with patient's son in detail about the clinical findings. With the foot possibly being source I would recommend a guillotine amputation at the ankle. The way patient was briefly taken off of BiPAP and had worsening respiratory issues  Im unsure if he can tolerate surgery without being intubated. I discussed this with the primary team.  As of now we will discontinue IV antibiotics. Ricky Hernandez

## 2020-05-25 NOTE — PROGRESS NOTES
Patient presents to the unit currently on Levophed at 15 mcg/min and on a NRB. Patient is alert and oriented and states he is in no pain. Patient has surgical wounds on both feet from recent toe amputations. The first through fourth toes on right foot and 2nd and third toes on the left. Sutures are still in place. The left great toe is black and necrotic. Both feet are swollen and appear reddened. Patient states he has had increased drainage from the right foot. Patient has an open area in the gluteal cleft as well as a reddened, blanchable area on the right buttock.

## 2020-05-25 NOTE — ED NOTES
TRANSFER - OUT REPORT: 
 
Verbal report given to Kendal(name) on Tiana Lawtons  being transferred to [facility](unit) for routine progression of care Report consisted of patients Situation, Background, Assessment and  
Recommendations(SBAR). Information from the following report(s) SBAR was reviewed with the receiving nurse. Lines:  
Peripheral IV 05/24/20 Right Antecubital (Active) Peripheral IV 05/24/20 Left Hand (Active) Site Assessment Clean, dry, & intact 5/24/2020  8:47 PM  
Phlebitis Assessment 0 5/24/2020  8:47 PM  
Infiltration Assessment 0 5/24/2020  8:47 PM  
Dressing Status Clean, dry, & intact 5/24/2020  8:47 PM  
   
Peripheral IV 05/24/20 Left Forearm (Active) Site Assessment Clean, dry, & intact 5/24/2020  8:47 PM  
Phlebitis Assessment 0 5/24/2020  8:47 PM  
Infiltration Assessment 0 5/24/2020  8:47 PM  
Dressing Status Clean, dry, & intact 5/24/2020  8:47 PM  
  
 
Opportunity for questions and clarification was provided. Patient transported with: 
 Registered Nurse

## 2020-05-25 NOTE — PROGRESS NOTES
Progress Note Patient: Mary Jo Valenzuela MRN: [id number]  SSN: [id number] YOB: 1945  Age: 69 y.o. Sex: male Admit Date: 5/24/2020 LOS: 1 day Subjective: PMH of obesity on home oxygen 2-3 LPM, recent right fourth left second and third toe amputations secondary to gangrene, Patient had aortic valve replacement with a bioprosthetic aortic valve and postop was noted to have gangrenous toes and has significant peripheral vascular disease. diabetes with diabetic nephropathy chronic kidney disease stage III with May 8 serum creatinine being 1.5. Chronic anticoagulation with warfarin for paroxysmal A. fib and recently aortic valve replacement but bioprosthetic. He has a permanent pacemaker, status post coronary artery bypass graft, bilateral carotid stenosis, and recent diagnosis of heparin-induced thrombocytopenia. Admitted due to near syncope this time. Found to have pulmonary edema, DEJUAN, anemia, hypoxia, osteomyelitis Diagnosed with sepsis. He is admitted to ICU. Today, patient is still feeling tired. Some shortness of breath. Still with fever.  
  
 
Objective:  
 
Vitals:  
 05/25/20 0715 05/25/20 0730 05/25/20 0800 05/25/20 0830 BP: 90/55 95/54 97/52 94/54 Pulse: 83 85 85 83 Resp: 29 (!) 38 (!) 34 30 Temp: 97.5 °F (36.4 °C) SpO2: 95% 96% 98% 97% Weight:      
Height:      
  
 
Intake and Output: 
Current Shift: No intake/output data recorded. Last three shifts: 05/23 1901 - 05/25 0700 In: 655 [I.V.:655] Out: 200 [Urine:200] Physical Exam:  
 
General:                    The patient is an elderly male in some acute respiratory distress at rest and when trying to speak. On oxygen cannula. Head:                                   Normocephalic/atraumatic. Eyes:                                   palpebral pallor, no scleral icterus.  
ENT:                                    External auricular and nasal exam within normal limits. Mucous membranes are moist. 
Neck:                                   Supple, non-tender, no JVD. Lungs:                       decreased to auscultation bilaterally without wheezes or crackles. No respiratory distress or accessory muscle use. Heart:                                  tachycardia, without murmurs, rubs, or gallops. Abdomen:                  Soft, non-tender, mildly distended due to truncal obesity with normoactive bowel sounds. Genitourinary:           No tenderness over the bladder or bilateral CVAs. Extremities:               Without clubbing, cyanosis, mild edema. Skin:                                    Normal color, texture, and turgor. No rashes, lesions, or jaundice. Pulses:                      Radial and dorsalis pedis pulses present 2+ bilaterally. Capillary refill <2s. Neurologic:                CN II-XII grossly intact and symmetrical.  
                                            Moving all four extremities well with no focal deficits. Psychiatric:                lethargic Alert and oriented x 3 Lab/Data Review: 
 
Recent Results (from the past 24 hour(s)) EKG, 12 LEAD, INITIAL Collection Time: 05/24/20  3:52 PM  
Result Value Ref Range Ventricular Rate 114 BPM  
 Atrial Rate 115 BPM  
 P-R Interval 172 ms QRS Duration 88 ms Q-T Interval 326 ms  
 QTC Calculation (Bezet) 449 ms Calculated P Axis 24 degrees Calculated R Axis 2 degrees Calculated T Axis 90 degrees Diagnosis Sinus tachycardia Nonspecific ST and T wave abnormality Abnormal ECG When compared with ECG of 13-FEB-2020 15:31, 
QRS duration has decreased Confirmed by Ritchie Fernandes ([id number]) on 5/25/2020 7:29:37 AM 
  
CBC WITH AUTOMATED DIFF Collection Time: 05/24/20  4:04 PM  
Result Value Ref Range WBC 15.6 (H) 4.3 - 11.1 K/uL  
 RBC 2.97 (L) 4.23 - 5.6 M/uL HGB 8.1 (L) 13.6 - 17.2 g/dL HCT 26.0 (L) 41.1 - 50.3 % MCV 87.5 79.6 - 97.8 FL  
 MCH 27.3 26.1 - 32.9 PG  
 MCHC 31.2 (L) 31.4 - 35.0 g/dL  
 RDW 15.3 (H) 11.9 - 14.6 % PLATELET 807 966 - 116 K/uL MPV 10.3 9.4 - 12.3 FL ABSOLUTE NRBC 0.00 0.0 - 0.2 K/uL NEUTROPHILS 93 (H) 43 - 78 % LYMPHOCYTES 2 (L) 13 - 44 % MONOCYTES 3 (L) 4.0 - 12.0 % EOSINOPHILS 0 (L) 0.5 - 7.8 % BASOPHILS 0 0.0 - 2.0 % IMMATURE GRANULOCYTES 2 0.0 - 5.0 %  
 ABS. NEUTROPHILS 14.5 (H) 1.7 - 8.2 K/UL  
 ABS. LYMPHOCYTES 0.3 (L) 0.5 - 4.6 K/UL  
 ABS. MONOCYTES 0.5 0.1 - 1.3 K/UL  
 ABS. EOSINOPHILS 0.0 0.0 - 0.8 K/UL  
 ABS. BASOPHILS 0.0 0.0 - 0.2 K/UL  
 ABS. IMM. GRANS. 0.3 0.0 - 0.5 K/UL  
 RBC COMMENTS SLIGHT 
HYPOCHROMIA 
    
 RBC COMMENTS MODERATE 
STOMATOCYTES 
    
 WBC COMMENTS Result Confirmed By Smear DF AUTOMATED METABOLIC PANEL, COMPREHENSIVE Collection Time: 05/24/20  4:04 PM  
Result Value Ref Range Sodium 136 136 - 145 mmol/L Potassium 4.0 3.5 - 5.1 mmol/L Chloride 100 98 - 107 mmol/L  
 CO2 28 21 - 32 mmol/L Anion gap 8 7 - 16 mmol/L Glucose 200 (H) 65 - 100 mg/dL BUN 30 (H) 8 - 23 MG/DL Creatinine 2.08 (H) 0.8 - 1.5 MG/DL  
 GFR est AA 40 (L) >60 ml/min/1.73m2 GFR est non-AA 33 (L) >60 ml/min/1.73m2 Calcium 8.0 (L) 8.3 - 10.4 MG/DL Bilirubin, total 0.5 0.2 - 1.1 MG/DL  
 ALT (SGPT) 20 12 - 65 U/L  
 AST (SGOT) 24 15 - 37 U/L Alk. phosphatase 139 (H) 50 - 136 U/L Protein, total 7.0 6.3 - 8.2 g/dL Albumin 1.9 (L) 3.2 - 4.6 g/dL Globulin 5.1 (H) 2.3 - 3.5 g/dL A-G Ratio 0.4 (L) 1.2 - 3.5 LACTIC ACID Collection Time: 05/24/20  4:04 PM  
Result Value Ref Range Lactic acid 3.0 (HH) 0.4 - 2.0 MMOL/L  
CULTURE, BLOOD Collection Time: 05/24/20  4:04 PM  
Result Value Ref Range Special Requests: RIGHT ANTECUBITAL  Culture result: NO GROWTH AFTER 8 HOURS    
 CULTURE, BLOOD Collection Time: 05/24/20  4:04 PM  
Result Value Ref Range Special Requests: LEFT ANTECUBITAL    
 GRAM STAIN GRAM POSITIVE COCCI    
 GRAM STAIN AEROBIC AND ANAEROBIC BOTTLES    
 GRAM STAIN     
  RESULTS VERIFIED, PHONED TO AND READ BACK BY Martha Jarad @ 9359 5/25/20 MM Culture result: CULTURE IN PROGRESS,FURTHER UPDATES TO FOLLOW Culture result: REFER TO BIOFIRE  PANEL ID [id number] PROCALCITONIN Collection Time: 05/24/20  4:04 PM  
Result Value Ref Range Procalcitonin 8.17 ng/mL NT-PRO BNP Collection Time: 05/24/20  4:04 PM  
Result Value Ref Range NT pro-BNP 4,134 (H) 5 - 125 PG/ML  
C REACTIVE PROTEIN, QT Collection Time: 05/24/20  4:04 PM  
Result Value Ref Range C-Reactive protein 20.5 (H) 0.0 - 0.9 mg/dL PTT Collection Time: 05/24/20  4:04 PM  
Result Value Ref Range aPTT 58.5 (H) 24.3 - 35.4 SEC PROTHROMBIN TIME + INR Collection Time: 05/24/20  4:04 PM  
Result Value Ref Range Prothrombin time 46.5 (H) 12.0 - 14.7 sec INR 4.8 (HH) BLOOD CULTURE ID PANEL Collection Time: 05/24/20  4:04 PM  
Result Value Ref Range Acc. no. from [facility] [id number] Staphylococcus Detected (A) NOTDET Staphylococcus aureus Detected (A) NOTDET    
 mecA (Methicillin-Resistance Genes) Detected (A) NOTDET INTERPRETATION Gram positive cocci in clusters, identified by realtime PCR as SUSPECTED MRSA. POC G3 Collection Time: 05/24/20  5:12 PM  
Result Value Ref Range Device: NASAL CANNULA pH (POC) 7.372 7.35 - 7.45    
 pCO2 (POC) 47.2 (H) 35 - 45 MMHG  
 pO2 (POC) 67 (L) 75 - 100 MMHG  
 HCO3 (POC) 27.4 (H) 22 - 26 MMOL/L  
 sO2 (POC) 92 (L) 95 - 98 % Base excess (POC) 2 mmol/L Allens test (POC) YES Site RIGHT RADIAL Specimen type (POC) ARTERIAL Performed by Joaquinifer   
 CO2, POC 29 MMOL/L Flow rate (POC) 3.000 L/min COLLECT TIME 1,709 URINALYSIS W/ RFLX MICROSCOPIC  
 Collection Time: 05/24/20  6:20 PM  
Result Value Ref Range Color YELLOW Appearance CLEAR Specific gravity 1.013 1.001 - 1.023    
 pH (UA) 7.0 5.0 - 9.0 Protein 30 (A) NEG mg/dL Glucose Negative NEG mg/dL Ketone Negative NEG mg/dL Bilirubin Negative NEG Blood Negative NEG Urobilinogen 1.0 0.2 - 1.0 EU/dL Nitrites Negative NEG Leukocyte Esterase TRACE (A) NEG    
 WBC 0-3 0 /hpf  
 RBC 10-20 0 /hpf Epithelial cells 0-3 0 /hpf Bacteria 0 0 /hpf Yeast OCCASIONAL    
CULTURE, URINE Collection Time: 05/24/20  7:00 PM  
Result Value Ref Range Special Requests: NO SPECIAL REQUESTS Culture result:     
  NO GROWTH AFTER SHORT PERIOD OF INCUBATION. FURTHER RESULTS TO FOLLOW AFTER OVERNIGHT INCUBATION. TROPONIN I Collection Time: 05/24/20  7:15 PM  
Result Value Ref Range Troponin-I, Qt. <0.02 (L) 0.02 - 0.05 NG/ML  
GLUCOSE, POC Collection Time: 05/24/20  8:35 PM  
Result Value Ref Range Glucose (POC) 184 (H) 65 - 100 mg/dL POC G3 Collection Time: 05/24/20  8:36 PM  
Result Value Ref Range Device: NASAL CANNULA pH (POC) 7.326 (L) 7.35 - 7.45    
 pCO2 (POC) 50.0 (H) 35 - 45 MMHG  
 pO2 (POC) 67 (L) 75 - 100 MMHG  
 HCO3 (POC) 26.1 (H) 22 - 26 MMOL/L  
 sO2 (POC) 91 (L) 95 - 98 % Base excess (POC) 0 mmol/L Allens test (POC) YES Site RIGHT RADIAL Specimen type (POC) ARTERIAL Performed by MIKE Camacho CO2, POC 28 MMOL/L Flow rate (POC) 5.000 L/min Respiratory comment: PhysicianNotified COLLECT TIME 2,035 GLUCOSE, POC Collection Time: 05/24/20 10:11 PM  
Result Value Ref Range Glucose (POC) 195 (H) 65 - 100 mg/dL METABOLIC PANEL, COMPREHENSIVE Collection Time: 05/25/20  1:08 AM  
Result Value Ref Range Sodium 138 136 - 145 mmol/L Potassium 4.8 3.5 - 5.1 mmol/L Chloride 105 98 - 107 mmol/L  
 CO2 25 21 - 32 mmol/L Anion gap 8 7 - 16 mmol/L Glucose 232 (H) 65 - 100 mg/dL BUN 32 (H) 8 - 23 MG/DL Creatinine 2.16 (H) 0.8 - 1.5 MG/DL  
 GFR est AA 39 (L) >60 ml/min/1.73m2 GFR est non-AA 32 (L) >60 ml/min/1.73m2 Calcium 8.0 (L) 8.3 - 10.4 MG/DL Bilirubin, total 0.5 0.2 - 1.1 MG/DL  
 ALT (SGPT) 18 12 - 65 U/L  
 AST (SGOT) 24 15 - 37 U/L Alk. phosphatase 123 50 - 136 U/L Protein, total 7.1 6.3 - 8.2 g/dL Albumin 1.8 (L) 3.2 - 4.6 g/dL Globulin 5.3 (H) 2.3 - 3.5 g/dL A-G Ratio 0.3 (L) 1.2 - 3.5 MAGNESIUM Collection Time: 05/25/20  1:08 AM  
Result Value Ref Range Magnesium 1.7 (L) 1.8 - 2.4 mg/dL CBC WITH AUTOMATED DIFF Collection Time: 05/25/20  1:08 AM  
Result Value Ref Range WBC 26.2 (H) 4.3 - 11.1 K/uL  
 RBC 3.15 (L) 4.23 - 5.6 M/uL HGB 8.5 (L) 13.6 - 17.2 g/dL HCT 29.1 (L) 41.1 - 50.3 % MCV 92.4 79.6 - 97.8 FL  
 MCH 27.0 26.1 - 32.9 PG  
 MCHC 29.2 (L) 31.4 - 35.0 g/dL  
 RDW 15.5 (H) 11.9 - 14.6 % PLATELET 670 906 - 543 K/uL MPV 10.1 9.4 - 12.3 FL ABSOLUTE NRBC 0.00 0.0 - 0.2 K/uL  
 DF AUTOMATED NEUTROPHILS 92 (H) 43 - 78 % LYMPHOCYTES 3 (L) 13 - 44 % MONOCYTES 4 4.0 - 12.0 % EOSINOPHILS 0 (L) 0.5 - 7.8 % BASOPHILS 0 0.0 - 2.0 % IMMATURE GRANULOCYTES 1 0.0 - 5.0 %  
 ABS. NEUTROPHILS 24.2 (H) 1.7 - 8.2 K/UL  
 ABS. LYMPHOCYTES 0.7 0.5 - 4.6 K/UL  
 ABS. MONOCYTES 1.1 0.1 - 1.3 K/UL  
 ABS. EOSINOPHILS 0.0 0.0 - 0.8 K/UL  
 ABS. BASOPHILS 0.1 0.0 - 0.2 K/UL  
 ABS. IMM. GRANS. 0.2 0.0 - 0.5 K/UL HEMOGLOBIN A1C WITH EAG Collection Time: 05/25/20  1:08 AM  
Result Value Ref Range Hemoglobin A1c 9.6 (H) 4.8 - 6.0 % Est. average glucose 229 mg/dL LACTIC ACID Collection Time: 05/25/20  1:08 AM  
Result Value Ref Range Lactic acid 1.6 0.4 - 2.0 MMOL/L  
TROPONIN I Collection Time: 05/25/20  1:08 AM  
Result Value Ref Range Troponin-I, Qt. <0.02 (L) 0.02 - 0.05 NG/ML  
CULTURE, WOUND W GRAM STAIN Collection Time: 05/25/20  2:00 AM  
Result Value Ref Range Special Requests: NO SPECIAL REQUESTS    
 GRAM STAIN PENDING Culture result:     
  NO GROWTH AFTER SHORT PERIOD OF INCUBATION. FURTHER RESULTS TO FOLLOW AFTER OVERNIGHT INCUBATION. TROPONIN I Collection Time: 05/25/20  7:04 AM  
Result Value Ref Range Troponin-I, Qt. <0.02 (L) 0.02 - 0.05 NG/ML  
GLUCOSE, POC Collection Time: 05/25/20  7:18 AM  
Result Value Ref Range Glucose (POC) 270 (H) 65 - 100 mg/dL XR chest  
5- IMPRESSION: Acute CHF exacerbation. XR foot 5- IMPRESSION: Soft tissue swelling and irregularity along the amputation plane of 
the right first through fourth toes. Osteomyelitis is a consideration. Consider 
further evaluation with MRI to better evaluate for osteomyelitis. I have reviewed chest x-ray and ECG myself. Current Facility-Administered Medications:  
  NUTRITIONAL SUPPORT ELECTROLYTE PRN ORDERS, , Does Not Apply, PRN, Hema Smith MD 
  sodium chloride (NS) flush 5-40 mL, 5-40 mL, IntraVENous, Q8H, Jarad Sneed DO, 15 mL at 05/25/20 0559   sodium chloride (NS) flush 5-40 mL, 5-40 mL, IntraVENous, PRN, Jarad Sneed,  
  acetaminophen (TYLENOL) tablet 650 mg, 650 mg, Oral, Q4H PRN, Jarad Sneed DO 
  famotidine (PEPCID) tablet 20 mg, 20 mg, Oral, Q12H, Jarad Sneed DO, 20 mg at 05/25/20 0803 
  albuterol (PROVENTIL VENTOLIN) nebulizer solution 2.5 mg, 2.5 mg, Nebulization, Q6H RT, Jarad Sneed DO, 2.5 mg at 05/25/20 8076   insulin lispro (HUMALOG) injection, , SubCUTAneous, AC&HS, Joe Meckel, DO, 6 Units at 05/25/20 7247   buPROPion [facility]) tablet 75 mg, 75 mg, Oral, BID, Joe Meckel, DO, 75 mg at 05/25/20 9862   ALPRAZolam (XANAX) tablet 0.5 mg, 0.5 mg, Oral, QHS PRN, Jarad Sneed DO 
  midodrine (PROAMATINE) tablet 10 mg, 10 mg, Oral, TID, Jarad Sneed DO, 10 mg at 05/25/20 1540   OLANZapine (ZyPREXA) tablet 5 mg, 5 mg, Oral, QHS, Candido Sneed DO, 5 mg at 05/24/20 2249   mirtazapine (REMERON) tablet 15 mg, 15 mg, Oral, QHS, Candido Sneed DO, 15 mg at 05/24/20 2251   sertraline (ZOLOFT) tablet 100 mg, 100 mg, Oral, DAILY, Candido Sneed DO, 100 mg at 05/25/20 5021   tamsulosin (FLOMAX) capsule 0.4 mg, 0.4 mg, Oral, DAILY, Candido Sneed DO, 0.4 mg at 05/25/20 9465   vancomycin (VANCOCIN) 1750 mg in  ml infusion, 1,750 mg, IntraVENous, Q24H, Candido Sneed DO, Last Rate: 250 mL/hr at 05/25/20 0846, 1,750 mg at 05/25/20 2324   levoFLOXacin (LEVAQUIN) 750 mg in D5W IVPB, 750 mg, IntraVENous, Q48H, Candido Sneed DO, Stopped at 05/24/20 2225   clindamycin (CLEOCIN) 600mg D5W 50mL IVPB (premix), 600 mg, IntraVENous, Q8H, Candido Sneed DO, Stopped at 05/25/20 3034   furosemide (LASIX) injection 40 mg, 40 mg, IntraVENous, BID, Candido Sneed DO, 40 mg at 05/25/20 0803 
  NOREPINephrine (LEVOPHED) 4 mg in 5% dextrose 250 mL infusion, 0.5-16 mcg/min, IntraVENous, TITRATE, Michaelle Iniguez MD, Last Rate: 18.8 mL/hr at 05/25/20 0910, 5 mcg/min at 05/25/20 7262 Assessment:  
 
Principal Problem: 
  Severe sepsis ([facility] (5/24/2020) Active Problems: 
  Gout (1/14/2013) BPH (benign prostatic hyperplasia) (1/14/2013) HLD (hyperlipidemia) (1/14/2013) Obesity, morbid ([facility] (10/19/2018) Type 2 diabetes with nephropathy ([facility] (8/26/2019) CAD (coronary artery disease) (1/10/2020) Overview: CABG (1/16/20):  LIMA to LAD. SVG to OM. SVG to PDA. S/P CABG x 3 (1/10/2020) Status post aortic valve replacement (1/10/2020) Overview: 1. AVR (1/16/20):  25 mm Intuity Jon Gutierres Valve. 2.  Echo (4/12/20):  EF 55-60%. Normal functioning AVR. MG 15. PG 26. Mild MR. Atrial fibrillation ([address] (1/13/2020) Overview: Post op CABG. HIT (heparin-induced thrombocytopenia) ([address] (1/23/2020) Hypotension (2/18/2020) Long term (current) use of anticoagulants (4/27/2020) Pacemaker (4/27/2020) Overview: Dual chamber Biotronik pacemaker placed (1/24/20): AV block post op AVR. Gangrene of toe of both feet ([address] (5/11/2020) Gangrenous toe ([address] (5/14/2020) PAD (peripheral artery disease) ([address] (5/14/2020) Hypoxia (5/14/2020) Lactic acidosis (5/24/2020) Osteomyelitis ([address] (5/24/2020) Acute CHF (congestive heart failure) ([address] (5/24/2020) DEJUAN (acute kidney injury) ([address] (5/24/2020) Plan:  
 
Severe sepsis with DEJUAN, hypoxia, Likely source of infection is osteomyelitis from feet. Keep in ICU Continue Empiric IV antibiotics. ID is consulted. On Levophed. Acute CHF Likely aggravated by sepsis Continue with Lasix. Ask cardiology to see. S/P aortic valve replacement. Diabetic foot Wound care nurse to see. S/P amputation of toes. Diabetes mellitus type 2 Monitor blood sugar. Cover with insulin sliding scale accordingly. DEJUAN Likely from sepsis and CHF Monitor renal function and intake and output. Avoid nephrotoxic agents. I have discussed the plan of care with patient and care team. 
 
DVT prophylaxis : SCD Signed By: Lu Farley MD   
 May 25, 2020

## 2020-05-25 NOTE — CONSULTS
Infectious Disease Consult Impression: · MRSA bacteremia: source would appear to be R foot. Local changes suggest soft tissue infection and removal HD access seems too early; not related to deteriorating symptoms. Odor from foot is necrotic · PVD with recent amputations gangrenous toes on both right and left foots. · He reports PCN allergy as feeling strange after IM PCN injection when he was in [facility] and Keflex and rash. Overall doubt these are major allergies. · History of AVR and pacemaker Plan:  
· Continue IV Vanc and Clindamycin · Repeat blood cxs · Needs TTE given recent  HD Line, but overall suspect the issue is the foot. Given devices and if he desires continue aggressive care and TTE negative would want to consider TERRI but given current respiratory issues do not think he would tolerate that/be candidate. · Follow sensitivities on isolates to confirm Clinda Sensitive: if not can stop. If foot looks better and WBC down in AM I will likely stop it tomorrow. · Add meropenem to cover broadly for GNR and anaerobes as WBC is climbing and odor is necrotic from R foot. · Unclear that x-ray imaging in setting recent surgery would be sufficient to make Dx osteo · Duration to be determined by events, repeat cx and TTE as well as patient decisions. Anti-infectives:  
Vancomycin 5/24- Clindamycin 5/24- Levaquin 5/24- Subjective:  
Date of Consultation:  May 25, 2020 Date of Admission: 5/24/2020 Referring Physician: Hemant Jernigan Patient is a 69 y.o. male with history of CAD, PVD with bilateral carotid stenosis, History of HIT, Type 2 DM and CKD, gout, A-fib,AVR and pacemaker and  sleep apnea on home oxygen but not CPAP who presented to ED on 5/24 after nearly passing out on his toilet. Found by EMS to be diaphoretic, Hadt070. 3. Noted recent change in BP meds. Dressing to both LE noted.   
 
Had been recently discharged after R 1st-thru 4th toe amputations and left second and third toe amputations on 5/14 for  right 1-4 toes and left 1-3 toes gangrene. BNP 4134, CXR suggested edema. Was given lasix and admitted to ICU. Temp on arrival here 100.2. WBC 15.6 with left shift. Lactic acid 3, HgbA1c 9.6. CRP 20.5. Neither admission exam notes LE cellulitic changes. X-ray R foot notes soft tissue swelling : changes of recent surgery with irregularity along bony cortices of 1st, second and third metatarsal heads; OM cannot be excluded per report. Blood cx from admission with MRSA by PCR in 4/4 bottles. Surface wound cx with pending gram stain and no growth so far. Vascular have seen and note some drainage at 1st toe amputation site and discussed surgery but patient has declined amputation foot. Vascular considering guillotine amputation at ankle. WBC up today to 26.2 despite coverage with Vanc/Clinda/Levaquin. Permacath removed by Vascular at [facility] on 5/5/20. Patient Active Problem List  
Diagnosis Code  DM type 2 (diabetes mellitus, type 2) ([facility] E11.9  
 Gout M10.9  BPH (benign prostatic hyperplasia) N40.0  Seasonal allergic rhinitis J30.2  
 HLD (hyperlipidemia) E78.5  Obesity, morbid ([facility] E66.01  
 Type 2 diabetes with nephropathy ([facility]) E11.21  
 Bilateral carotid artery stenosis I65.23  
 CAD (coronary artery disease) I25.10  
 S/P CABG x 3 Z95.1  Status post aortic valve replacement Z95.2  Atrial fibrillation ([facility]) I48.91  
 HIT (heparin-induced thrombocytopenia) ([facility]) D75.82  
 Hypotension I95.9  Acute renal failure on dialysis ([facility]) N17.9, Z99.2  DNR (do not resuscitate) [phone]  Long term (current) use of anticoagulants Z79.01  
 Pacemaker Z95.0  Gangrene of toe of both feet ([facility]) I96  
 Gangrenous toe ([facility] I96  
 PAD (peripheral artery disease) ([facility]) I73.9  Hypoxia R09.02  
 Lactic acidosis E87.2  Osteomyelitis ([facility] M86.9  Acute CHF (congestive heart failure) ([facility]) I50.9  Severe sepsis (HCC) A41.9, R65.20  DEJUAN (acute kidney injury) ([facility] N17.9 Past Medical History:  
Diagnosis Date  Arthritis  BPH (benign prostatic hyperplasia) 1/14/2013  CAD (coronary artery disease) CABG- 1-2020  DM type 2 (diabetes mellitus, type 2) ([facility] dx 2004 Type 2, avg fbs 250 recognizes hypo at 67,  Dyspnea   
 at times, Uses Albuterol inhaler when needed (last used first of aug 2014)  Gout 1/14/2013  HLD (hyperlipidemia) 1/14/2013  
 HTN (hypertension)   
 controlled with meds  Morbid obesity ([facility] 9/3/14 BMI 41.7  Oxygen dependent 2.5 liters mainly at night, as needed  Psychiatric disorder   
 anxiety, controlled with buspar  Rheumatic fever   
 as a child  Seasonal allergic rhinitis   
 treated with Allegra  Severe aortic valve stenosis   
 echo 09/11/19 EF 60-65%- mild to moderate regurgitation  Thyroid disease   
 hx- no longer takes synthroid  Unspecified sleep apnea 2016  
 no bipap at this time Family History Problem Relation Age of Onset  Lung Disease Mother   
     copd  Cancer Father   
     lympnode cancer  No Known Problems Brother  Cancer Other   
     fmh lymphoma  Diabetes Other   
     fmhx  Diabetes Maternal Grandfather Social History Tobacco Use  Smoking status: Never Smoker  Smokeless tobacco: Never Used Substance Use Topics  Alcohol use: Not Currently Alcohol/week: 0.0 standard drinks Past Surgical History:  
Procedure Laterality Date  HX CATARACT REMOVAL Bilateral 2012  HX HEART CATHETERIZATION  12/23/2019  HX ORTHOPAEDIC Left   
 carpal tunnel  HX TONSIL AND ADENOIDECTOMY  IR INSERT TUNL CVC W PORT OVER 5 YEARS  2/4/2020  
 removed  VASCULAR SURGERY PROCEDURE UNLIST    
 cabg x 3 with avr  
  
Prior to Admission medications Medication Sig Start Date End Date Taking? Authorizing Provider Jantoven 7.5 mg tablet Take 1 Tab by mouth daily. Wendel Bo and Friday or as directed 5/22/20   Arabella De Oliveira MD  
Jantoven 5 mg tablet Take 1 Tab by mouth daily. Tues, Thurs and Sunday or as directed 5/22/20   Arabella De Oliveira MD  
furosemide (LASIX) 40 mg tablet Take 1 Tab by mouth two (2) times a day. Take 1 tablet twice a day for 3 days then resume 1 tablet per day 5/15/20   Ruiz Vo NP  
ALPRAZolam Alvia Justina) 0.5 mg tablet Take 1 Tab by mouth nightly as needed for Anxiety. Max Daily Amount: 0.5 mg. 5/13/20   Sharon Damon MD  
sertraline (ZOLOFT) 100 mg tablet Take 1 Tab by mouth daily. 5/13/20   Sharon Damon MD  
potassium chloride (KLOR-CON) 10 mEq tablet Take 1 Tab by mouth daily. 5/13/20   Sharon Damon MD  
tamsulosin (Flomax) 0.4 mg capsule Take 1 Cap by mouth daily. 4/29/20   Sharon Damon MD  
OLANZapine (ZyPREXA) 5 mg tablet Take 1 Tab by mouth nightly. 4/29/20   Shraon Damon MD  
mirtazapine (Remeron) 15 mg tablet Take 1 Tab by mouth nightly. 4/29/20   Sharon Damon MD  
insulin glargine (Lantus Solostar U-100 Insulin) 100 unit/mL (3 mL) inpn 25 units sq qhs and 5 units sq qam 4/29/20   Sharon Damon MD  
buPROPion [facility]) 75 mg tablet Take 1 Tab by mouth two (2) times a day. 4/29/20   Sharon Damon MD  
insulin aspart U-100 (NovoLOG) 100 unit/mL crtg Check sugar qac. Less than 200 no units, 201-250 2 units sq, 251-300 4 units sq, 301-350 6 units sq, 351-400 8 units sq, over 401 10 units sq. 4/29/20   Jose A Batres MD  
fludrocortisone (FLORINEF) 0.1 mg tablet 0.1 mg daily. 2 tabs po daily 4/26/20   Provider, Historical  
midodrine (PROAMATINE) 10 mg tablet  4/26/20   Provider, Historical  
 
Allergies Allergen Reactions  Heparin Other (comments) HIT antibody test strongly positive on 1/17/2020. SHARON drawn 1/18/2020 and resulted positive on 1/21/2020  Amoxicillin Rash  Keflex [Cephalexin] Rash  Pcn [Penicillins] Other (comments) \"felt closed in\". No rash, pt reports having it since without reaction Review of Systems:  A comprehensive review of systems was negative except for that written in the History of Present Illness. Objective:  
Blood pressure (!) 86/50, pulse 81, temperature 97.5 °F (36.4 °C), resp. rate 26, height 5' 10\" (1.778 m), weight 136.5 kg (300 lb 14.9 oz), SpO2 97 %. Temp (24hrs), Av.3 °F (36.3 °C), Min:95.1 °F (35.1 °C), Max:100.2 °F (37.9 °C) Exam:   
General:  Alert, cooperative, well noursished, well developed, appears stated age Eyes:  Sclera anicteric. Pupils equally round and reactive to light. No splinter hemorrhages Mouth/Throat: Mucous membranes normal, oral pharynx clear; partial/ gold caps Neck: Supple and symmetric Lungs:   Clear to auscultation bilaterally, good effort CV:  Regular rate and rhythm,no murmur, click, rub or gallop Abdomen:   Soft, non-tender. bowel sounds normal. non-distended Extremities: No cyanosis or edema: R leg is warm over left one; some more local edema than left but minor Skin: Amputation sites on left look ok but on R there is clear drainage from great toe site and has foul/necrotic odor; no crepitus Lymph nodes: Cervical and supraclavicular normal  
Musculoskeletal: No swelling or deformity Lines/Devices:  none Psych: Alert and oriented, normal mood affect given the setting Data Review:  
Recent Results (from the past 24 hour(s)) EKG, 12 LEAD, INITIAL Collection Time: 20  3:52 PM  
Result Value Ref Range Ventricular Rate 114 BPM  
 Atrial Rate 115 BPM  
 P-R Interval 172 ms QRS Duration 88 ms Q-T Interval 326 ms  
 QTC Calculation (Bezet) 449 ms Calculated P Axis 24 degrees Calculated R Axis 2 degrees Calculated T Axis 90 degrees Diagnosis Sinus tachycardia Nonspecific ST and T wave abnormality Abnormal ECG When compared with ECG of 2020 15:31, 
 QRS duration has decreased Confirmed by Lizbeth Bhatti ([id number]) on 5/25/2020 7:29:37 AM 
  
CBC WITH AUTOMATED DIFF Collection Time: 05/24/20  4:04 PM  
Result Value Ref Range WBC 15.6 (H) 4.3 - 11.1 K/uL  
 RBC 2.97 (L) 4.23 - 5.6 M/uL HGB 8.1 (L) 13.6 - 17.2 g/dL HCT 26.0 (L) 41.1 - 50.3 % MCV 87.5 79.6 - 97.8 FL  
 MCH 27.3 26.1 - 32.9 PG  
 MCHC 31.2 (L) 31.4 - 35.0 g/dL  
 RDW 15.3 (H) 11.9 - 14.6 % PLATELET 830 705 - 667 K/uL MPV 10.3 9.4 - 12.3 FL ABSOLUTE NRBC 0.00 0.0 - 0.2 K/uL NEUTROPHILS 93 (H) 43 - 78 % LYMPHOCYTES 2 (L) 13 - 44 % MONOCYTES 3 (L) 4.0 - 12.0 % EOSINOPHILS 0 (L) 0.5 - 7.8 % BASOPHILS 0 0.0 - 2.0 % IMMATURE GRANULOCYTES 2 0.0 - 5.0 %  
 ABS. NEUTROPHILS 14.5 (H) 1.7 - 8.2 K/UL  
 ABS. LYMPHOCYTES 0.3 (L) 0.5 - 4.6 K/UL  
 ABS. MONOCYTES 0.5 0.1 - 1.3 K/UL  
 ABS. EOSINOPHILS 0.0 0.0 - 0.8 K/UL  
 ABS. BASOPHILS 0.0 0.0 - 0.2 K/UL  
 ABS. IMM. GRANS. 0.3 0.0 - 0.5 K/UL  
 RBC COMMENTS SLIGHT 
HYPOCHROMIA 
    
 RBC COMMENTS MODERATE 
STOMATOCYTES 
    
 WBC COMMENTS Result Confirmed By Smear DF AUTOMATED METABOLIC PANEL, COMPREHENSIVE Collection Time: 05/24/20  4:04 PM  
Result Value Ref Range Sodium 136 136 - 145 mmol/L Potassium 4.0 3.5 - 5.1 mmol/L Chloride 100 98 - 107 mmol/L  
 CO2 28 21 - 32 mmol/L Anion gap 8 7 - 16 mmol/L Glucose 200 (H) 65 - 100 mg/dL BUN 30 (H) 8 - 23 MG/DL Creatinine 2.08 (H) 0.8 - 1.5 MG/DL  
 GFR est AA 40 (L) >60 ml/min/1.73m2 GFR est non-AA 33 (L) >60 ml/min/1.73m2 Calcium 8.0 (L) 8.3 - 10.4 MG/DL Bilirubin, total 0.5 0.2 - 1.1 MG/DL  
 ALT (SGPT) 20 12 - 65 U/L  
 AST (SGOT) 24 15 - 37 U/L Alk. phosphatase 139 (H) 50 - 136 U/L Protein, total 7.0 6.3 - 8.2 g/dL Albumin 1.9 (L) 3.2 - 4.6 g/dL Globulin 5.1 (H) 2.3 - 3.5 g/dL A-G Ratio 0.4 (L) 1.2 - 3.5 LACTIC ACID Collection Time: 05/24/20  4:04 PM  
Result Value Ref Range Lactic acid 3.0 (HH) 0.4 - 2.0 MMOL/L  
CULTURE, BLOOD Collection Time: 05/24/20  4:04 PM  
Result Value Ref Range Special Requests: RIGHT ANTECUBITAL    
 GRAM STAIN GRAM POS COCCI IN CLUSTERS    
 GRAM STAIN AEROBIC AND ANAEROBIC BOTTLES    
 GRAM STAIN     
  CRITICAL RESULT NOT CALLED DUE TO PREVIOUS NOTIFICATION OF CRITICAL RESULT WITHIN THE LAST 24 HOURS. Culture result: CULTURE IN PROGRESS,FURTHER UPDATES TO FOLLOW CULTURE, BLOOD Collection Time: 05/24/20  4:04 PM  
Result Value Ref Range Special Requests: LEFT ANTECUBITAL    
 GRAM STAIN GRAM POSITIVE COCCI    
 GRAM STAIN AEROBIC AND ANAEROBIC BOTTLES    
 GRAM STAIN     
  RESULTS VERIFIED, PHONED TO AND READ BACK BY Martha Jarad @ 5091 5/25/20 MM Culture result: CULTURE IN PROGRESS,FURTHER UPDATES TO FOLLOW Culture result: REFER TO [facility]  PANEL ID [id number] PROCALCITONIN Collection Time: 05/24/20  4:04 PM  
Result Value Ref Range Procalcitonin 8.17 ng/mL NT-PRO BNP Collection Time: 05/24/20  4:04 PM  
Result Value Ref Range NT pro-BNP 4,134 (H) 5 - 125 PG/ML  
C REACTIVE PROTEIN, QT Collection Time: 05/24/20  4:04 PM  
Result Value Ref Range C-Reactive protein 20.5 (H) 0.0 - 0.9 mg/dL PTT Collection Time: 05/24/20  4:04 PM  
Result Value Ref Range aPTT 58.5 (H) 24.3 - 35.4 SEC PROTHROMBIN TIME + INR Collection Time: 05/24/20  4:04 PM  
Result Value Ref Range Prothrombin time 46.5 (H) 12.0 - 14.7 sec INR 4.8 (HH) BLOOD CULTURE ID PANEL Collection Time: 05/24/20  4:04 PM  
Result Value Ref Range Acc. no. from [employer] [id number] Staphylococcus Detected (A) NOTDET Staphylococcus aureus Detected (A) NOTDET    
 mecA (Methicillin-Resistance Genes) Detected (A) NOTDET INTERPRETATION Gram positive cocci in clusters, identified by realtime PCR as SUSPECTED MRSA. POC G3 Collection Time: 05/24/20  5:12 PM  
Result Value Ref Range Device: NASAL CANNULA pH (POC) 7.372 7.35 - 7.45    
 pCO2 (POC) 47.2 (H) 35 - 45 MMHG  
 pO2 (POC) 67 (L) 75 - 100 MMHG  
 HCO3 (POC) 27.4 (H) 22 - 26 MMOL/L  
 sO2 (POC) 92 (L) 95 - 98 % Base excess (POC) 2 mmol/L Allens test (POC) YES Site RIGHT RADIAL Specimen type (POC) ARTERIAL Performed by Choco   
 CO2, POC 29 MMOL/L Flow rate (POC) 3.000 L/min COLLECT TIME 1,709 URINALYSIS W/ RFLX MICROSCOPIC Collection Time: 05/24/20  6:20 PM  
Result Value Ref Range Color YELLOW Appearance CLEAR Specific gravity 1.013 1.001 - 1.023    
 pH (UA) 7.0 5.0 - 9.0 Protein 30 (A) NEG mg/dL Glucose Negative NEG mg/dL Ketone Negative NEG mg/dL Bilirubin Negative NEG Blood Negative NEG Urobilinogen 1.0 0.2 - 1.0 EU/dL Nitrites Negative NEG Leukocyte Esterase TRACE (A) NEG    
 WBC 0-3 0 /hpf  
 RBC 10-20 0 /hpf Epithelial cells 0-3 0 /hpf Bacteria 0 0 /hpf Yeast OCCASIONAL    
CULTURE, URINE Collection Time: 05/24/20  7:00 PM  
Result Value Ref Range Special Requests: NO SPECIAL REQUESTS Culture result:     
  NO GROWTH AFTER SHORT PERIOD OF INCUBATION. FURTHER RESULTS TO FOLLOW AFTER OVERNIGHT INCUBATION. TROPONIN I Collection Time: 05/24/20  7:15 PM  
Result Value Ref Range Troponin-I, Qt. <0.02 (L) 0.02 - 0.05 NG/ML  
GLUCOSE, POC Collection Time: 05/24/20  8:35 PM  
Result Value Ref Range Glucose (POC) 184 (H) 65 - 100 mg/dL POC G3 Collection Time: 05/24/20  8:36 PM  
Result Value Ref Range Device: NASAL CANNULA pH (POC) 7.326 (L) 7.35 - 7.45    
 pCO2 (POC) 50.0 (H) 35 - 45 MMHG  
 pO2 (POC) 67 (L) 75 - 100 MMHG  
 HCO3 (POC) 26.1 (H) 22 - 26 MMOL/L  
 sO2 (POC) 91 (L) 95 - 98 % Base excess (POC) 0 mmol/L Allens test (POC) YES Site RIGHT RADIAL Specimen type (POC) ARTERIAL Performed by MIKE Camacho CO2, POC 28 MMOL/L Flow rate (POC) 5.000 L/min Respiratory comment: PhysicianNotified COLLECT TIME 2,035 GLUCOSE, POC Collection Time: 05/24/20 10:11 PM  
Result Value Ref Range Glucose (POC) 195 (H) 65 - 100 mg/dL METABOLIC PANEL, COMPREHENSIVE Collection Time: 05/25/20  1:08 AM  
Result Value Ref Range Sodium 138 136 - 145 mmol/L Potassium 4.8 3.5 - 5.1 mmol/L Chloride 105 98 - 107 mmol/L  
 CO2 25 21 - 32 mmol/L Anion gap 8 7 - 16 mmol/L Glucose 232 (H) 65 - 100 mg/dL BUN 32 (H) 8 - 23 MG/DL Creatinine 2.16 (H) 0.8 - 1.5 MG/DL  
 GFR est AA 39 (L) >60 ml/min/1.73m2 GFR est non-AA 32 (L) >60 ml/min/1.73m2 Calcium 8.0 (L) 8.3 - 10.4 MG/DL Bilirubin, total 0.5 0.2 - 1.1 MG/DL  
 ALT (SGPT) 18 12 - 65 U/L  
 AST (SGOT) 24 15 - 37 U/L Alk. phosphatase 123 50 - 136 U/L Protein, total 7.1 6.3 - 8.2 g/dL Albumin 1.8 (L) 3.2 - 4.6 g/dL Globulin 5.3 (H) 2.3 - 3.5 g/dL A-G Ratio 0.3 (L) 1.2 - 3.5 MAGNESIUM Collection Time: 05/25/20  1:08 AM  
Result Value Ref Range Magnesium 1.7 (L) 1.8 - 2.4 mg/dL CBC WITH AUTOMATED DIFF Collection Time: 05/25/20  1:08 AM  
Result Value Ref Range WBC 26.2 (H) 4.3 - 11.1 K/uL  
 RBC 3.15 (L) 4.23 - 5.6 M/uL HGB 8.5 (L) 13.6 - 17.2 g/dL HCT 29.1 (L) 41.1 - 50.3 % MCV 92.4 79.6 - 97.8 FL  
 MCH 27.0 26.1 - 32.9 PG  
 MCHC 29.2 (L) 31.4 - 35.0 g/dL  
 RDW 15.5 (H) 11.9 - 14.6 % PLATELET 861 441 - 392 K/uL MPV 10.1 9.4 - 12.3 FL ABSOLUTE NRBC 0.00 0.0 - 0.2 K/uL  
 DF AUTOMATED NEUTROPHILS 92 (H) 43 - 78 % LYMPHOCYTES 3 (L) 13 - 44 % MONOCYTES 4 4.0 - 12.0 % EOSINOPHILS 0 (L) 0.5 - 7.8 % BASOPHILS 0 0.0 - 2.0 % IMMATURE GRANULOCYTES 1 0.0 - 5.0 %  
 ABS. NEUTROPHILS 24.2 (H) 1.7 - 8.2 K/UL  
 ABS. LYMPHOCYTES 0.7 0.5 - 4.6 K/UL  
 ABS. MONOCYTES 1.1 0.1 - 1.3 K/UL  
 ABS. EOSINOPHILS 0.0 0.0 - 0.8 K/UL  
 ABS. BASOPHILS 0.1 0.0 - 0.2 K/UL  
 ABS. IMM. GRANS. 0.2 0.0 - 0.5 K/UL HEMOGLOBIN A1C WITH EAG  
 Collection Time: 05/25/20  1:08 AM  
Result Value Ref Range Hemoglobin A1c 9.6 (H) 4.8 - 6.0 % Est. average glucose 229 mg/dL LACTIC ACID Collection Time: 05/25/20  1:08 AM  
Result Value Ref Range Lactic acid 1.6 0.4 - 2.0 MMOL/L  
TROPONIN I Collection Time: 05/25/20  1:08 AM  
Result Value Ref Range Troponin-I, Qt. <0.02 (L) 0.02 - 0.05 NG/ML  
CULTURE, WOUND W GRAM STAIN Collection Time: 05/25/20  2:00 AM  
Result Value Ref Range Special Requests: NO SPECIAL REQUESTS    
 GRAM STAIN PENDING Culture result:     
  NO GROWTH AFTER SHORT PERIOD OF INCUBATION. FURTHER RESULTS TO FOLLOW AFTER OVERNIGHT INCUBATION. TROPONIN I Collection Time: 05/25/20  7:04 AM  
Result Value Ref Range Troponin-I, Qt. <0.02 (L) 0.02 - 0.05 NG/ML  
GLUCOSE, POC Collection Time: 05/25/20  7:18 AM  
Result Value Ref Range Glucose (POC) 270 (H) 65 - 100 mg/dL Microbiology:   
All Micro Results Procedure Component Value Units Date/Time CULTURE, BLOOD [[id number]] Collected:  05/24/20 1604 Order Status:  Completed Specimen:  Blood Updated:  05/25/20 0950 Special Requests: RIGHT ANTECUBITAL     
  GRAM STAIN    
  GRAM POS COCCI IN CLUSTERS  
     
      
  AEROBIC AND ANAEROBIC BOTTLES  
     
      
  CRITICAL RESULT NOT CALLED DUE TO PREVIOUS NOTIFICATION OF CRITICAL RESULT WITHIN THE LAST 24 HOURS. Culture result:    
  CULTURE IN PROGRESS,FURTHER UPDATES TO FOLLOW CULTURE, URINE [[id number]] Collected:  05/24/20 1900 Order Status:  Completed Specimen:  Cath Urine Updated:  05/25/20 3420 Special Requests: NO SPECIAL REQUESTS Culture result:    
  NO GROWTH AFTER SHORT PERIOD OF INCUBATION. FURTHER RESULTS TO FOLLOW AFTER OVERNIGHT INCUBATION. CULTURE, Alex Moment STAIN [[id number]] Collected:  05/25/20 0200 Order Status:  Completed Specimen:  Wound from Foot Updated:  05/25/20 7670 Special Requests: NO SPECIAL REQUESTS GRAM STAIN PENDING Culture result:    
  NO GROWTH AFTER SHORT PERIOD OF INCUBATION. FURTHER RESULTS TO FOLLOW AFTER OVERNIGHT INCUBATION. CULTURE, BLOOD [[id number]] Collected:  05/24/20 1604 Order Status:  Completed Specimen:  Blood Updated:  05/25/20 6018 Special Requests: LEFT ANTECUBITAL     
  GRAM STAIN GRAM POSITIVE COCCI AEROBIC AND ANAEROBIC BOTTLES RESULTS VERIFIED, PHONED TO AND READ BACK BY Nnamdimayvick Nuñez @ 5738 5/25/20 MM Culture result:    
  CULTURE IN PROGRESS,FURTHER UPDATES TO FOLLOW REFER TO [facility]  PANEL ID [id number] [id number] BLOOD CULTURE ID PANEL [[id number]]  (Abnormal) Collected:  05/24/20 1604 Order Status:  Completed Specimen:  Blood Updated:  05/25/20 9486 Acc. no. from [employer] [id number] Staphylococcus Detected Staphylococcus aureus Detected Comment: RESULTS VERIFIED, PHONED TO AND READ BACK BY 
Christiano Blanco RN @ 8420 ON 5/25/2020 AK. mecA (Methicillin-Resistance Genes) Detected Comment: Presence of mecA is highly indicative of methicillin resistance. The test does not replace traditional culture and susceptibilities RESULTS VERIFIED, PHONED TO AND READ BACK BY 
Christiano Blanco RN @ 6079 ON 05/25/2020 AK. INTERPRETATION Gram positive cocci in clusters, identified by realtime PCR as SUSPECTED MRSA. Comment: Recommend IV vancomycin use, unlikely to be a contaminant. Infectious Diseases Consult recommended in adult patients. THIS TEST DOES NOT REPLACE SENSITIVITY TESTING. Studies:   
 
Right foot 
  
CLINICAL INDICATION: Right foot swelling and erythema, recent toe amputations 
  
FINDINGS: Two views of the right foot show amputation across the MTP joints of 
the first through fourth toes.  There are small bone fragments in the amputation 
plane with irregular soft tissue swelling and thickening noted distally at the 
 level the amputation. There is irregularity along the bony cortices of the 
first, second, and third metatarsal heads. Osteomyelitis cannot be excluded by 
plain radiography. The fifth toe is unremarkable. 
  
IMPRESSION IMPRESSION: Soft tissue swelling and irregularity along the amputation plane of 
the right first through fourth toes. Osteomyelitis is a consideration. Consider 
further evaluation with MRI to better evaluate for osteomyelitis. Study Result Portable chest x-ray 
  
CLINICAL INDICATION: Dyspnea 
  
FINDINGS: Single AP view of the chest compared to a similar exam dated 5/15/2020 
shows stable bilateral pulmonary edema pattern. A pacer device is in place. Postcardiac valve replacement changes evident. Small bilateral pleural effusions 
suspected. 
  
IMPRESSION IMPRESSION: Acute CHF exacerbation. Signed By: Darrius Portillo MD   
 May 25, 2020

## 2020-05-25 NOTE — PROGRESS NOTES
Rx consulted? YES Warfarin indication:  Afib, hx VTE Goal INR:    2-3 Home dose:  7.5 mg Oral DAILY, Mon, Wed and Friday or as directed 5 mg Oral DAILY, Tues, Thurs and Sunday or as directed Dose ordered:   held Baseline INR:  4.8 Other anticoagulants/bridge:   no 
Drug Interactions:   Levaquin Ht Readings from Last 1 Encounters:  
05/24/20 5' 10\" (1.778 m) Wt Readings from Last 1 Encounters:  
05/24/20 113.4 kg (250 lb) Lab Results Component Value Date/Time BUN 30 (H) 05/24/2020 04:04 PM  
 Creatinine 2.08 (H) 05/24/2020 04:04 PM  
 PLATELET 095 77/50/7471 04:04 PM  
 HGB 8.1 (L) 05/24/2020 04:04 PM  
 INR 4.8 (HH) 05/24/2020 04:04 PM  
 
69 y.o., male, Body mass index is 35.87 kg/m². Estimated Creatinine Clearance: 39.9 mL/min (A) (based on SCr of 2.08 mg/dL (H)). Assessment/Plan: Pt with supertherapeutic INR of 4.8, pharmacy to restart warfarin when INR <3.  Pt w/ hx of HIT. Pt starting Levaquin which can effect INR. No signs or symptoms of bleeding. Dose to be held tonight and pharmacy to reassess in AM. Pharmacy to monitor INR daily and for signs and symptoms of bleeding. Thank you for this consult, Britt Cook, PharmD

## 2020-05-25 NOTE — PROGRESS NOTES
Bedside and Verbal shift change report given to Feliciano Pina RN (oncoming nurse) by [id number] MIKE Malloy RN (offgoing nurse). Report included the following information SBAR, Kardex, ED Summary, Intake/Output, MAR, Recent Results and Cardiac Rhythm NSR.

## 2020-05-25 NOTE — ED NOTES
RN to bedside for BP 70/40. MD called to bedside. Pt taken off bipap, levo stared, pt hard to arouse, another IV established, respiratory called for ABG, place on 5LNC, pt tachypnic, cool, pale, temp 98.6 axillary

## 2020-05-25 NOTE — CONSULTS
CONSULT NOTE Natasha Rutledge 5/25/2020 Date of Admission:  5/24/2020 The patient's chart is reviewed and the patient is discussed with the staff. Subjective:  
 
Patient is a 69 y.o.  male seen and evaluated at the request of Dr. Gretel Jernigan for acute respiratory failure, severe sepsis, hypotension, BIPAP and pressor support. Was on the toilet and had near syncope, EMS was notified. He reported dyspnea on exertion, chills and EMS reported documented fever. In ER BNP was 4134 with recent prior being 2600 on May 15. CXR with appearance of bilateral alveolar edema consistent with acute pulmonary edema/CHF. Has preserved LVEF on recent ECHO. He has a history of paroxysmal A. fib chronically, creat 2.0, LA was 3, glucose 200, hemoglobin 8, WBC 15, procal 8. ABG:  pH of 7.37 PCO2 47, PaO2 67 on 3 L, sat 92%. He declined in ER, was unable to speak complete sentences, O2 sat dropped and BP was borderline. Turner placed and given IV Lasix. Was admitted to ICU on BIPAP, DNR confirmed with severe sepsis, DEJUAN , presumed CHF and concern for osteomyelitis source with bilateral malodorousl feet. Continued on Vancomycin, consulted intensivist, ID, cardiology, vascular, wound care. Was recently discharged from this hospital by Dr. Savannah Nunez vascular surgery service May 15 following right fourth left second and third toe amputations secondary to gangrene. Was discharged home on Midrin and Florinef. HPI: Morbidly obese patient, chronic 2-3 L home O2, CAD s/p CABG with AVR 1/10/20 by Dr. Mar Ross complicated by CKD requiring HD, HIT +, DVTs, wound left thigh and pneumonia, PVD, DM with diabetic nephropathy. Chronic anticoagulation with warfarin for paroxysmal A. fib and recently bioprosthetic AVR. He has a permanent pacemaker, bilateral carotid stenosis.   He did see cardiology Friday2 days ago and claims medications were changed but he cannot tell me which ones. He cannot tell me the dose of his warfarin. I do have a discharge summary and reviewed MAR. Currently in the ICU is on BIPAP, alert and responsive, denies shortness of breath or productive cough. Requiring pressor support with Levophed. Review of Systems A comprehensive review of systems was negative except for: Constitutional: positive for chills, sweats, fatigue and malaise Respiratory: positive for dyspnea on exertion or chronic respiratory failure--2-3L NC Cardiovascular: positive for dyspnea, fatigue, lower extremity edema, dyspnea on exertion Genitourinary: positive for CKD--required Dialysis 3/20 Musculoskeletal: positive for muscle weakness and partial toe amputation Behvioral/Psych: positive for anxiety, depression, obesity and sleep disturbance Endocrine: positive for DM Patient Active Problem List  
Diagnosis Code  DM type 2 (diabetes mellitus, type 2) ([facility] E11.9  
 Gout M10.9  BPH (benign prostatic hyperplasia) N40.0  Seasonal allergic rhinitis J30.2  
 HLD (hyperlipidemia) E78.5  Obesity, morbid ([facility] E66.01  
 Type 2 diabetes with nephropathy ([facility]) E11.21  
 Bilateral carotid artery stenosis I65.23  
 CAD (coronary artery disease) I25.10  
 S/P CABG x 3 Z95.1  Status post aortic valve replacement Z95.2  Atrial fibrillation ([facility]) I48.91  
 HIT (heparin-induced thrombocytopenia) ([facility]) D75.82  
 Hypotension I95.9  Acute renal failure on dialysis ([facility]) N17.9, Z99.2  DNR (do not resuscitate) [phone]  Long term (current) use of anticoagulants Z79.01  
 Pacemaker Z95.0  Gangrene of toe of both feet ([facility]) I96  
 Gangrenous toe ([facility] I96  
 PAD (peripheral artery disease) ([facility]) I73.9  Hypoxia R09.02  
 Lactic acidosis E87.2  Osteomyelitis ([facility] M86.9  Acute CHF (congestive heart failure) ([facility]) I50.9  Severe sepsis ([facility]) A41.9, R65.20  DEJUAN (acute kidney injury) ([facility] N17.9  Chronic respiratory failure with hypoxia ([facility] J96.11 Home O2 2-3 L per NC, chronic. Prior to Admission Medications Prescriptions Last Dose Informant Patient Reported? Taking? ALPRAZolam (XANAX) 0.5 mg tablet   No No  
Sig: Take 1 Tab by mouth nightly as needed for Anxiety. Max Daily Amount: 0.5 mg.  
Jantoven 5 mg tablet   No No  
Sig: Take 1 Tab by mouth daily. César Hair and  or as directed Jantoven 7.5 mg tablet   No No  
Sig: Take 1 Tab by mouth daily. Mon, Wed and Friday or as directed OLANZapine (ZyPREXA) 5 mg tablet   No No  
Sig: Take 1 Tab by mouth nightly. buPROPion (WELLBUTRIN) 75 mg tablet   No No  
Sig: Take 1 Tab by mouth two (2) times a day. fludrocortisone (FLORINEF) 0.1 mg tablet   Yes No  
Si.1 mg daily. 2 tabs po daily  
furosemide (LASIX) 40 mg tablet   No No  
Sig: Take 1 Tab by mouth two (2) times a day. Take 1 tablet twice a day for 3 days then resume 1 tablet per day  
insulin aspart U-100 (NovoLOG) 100 unit/mL crtg   No No  
Sig: Check sugar qac. Less than 200 no units, 201-250 2 units sq, 251-300 4 units sq, 301-350 6 units sq, 351-400 8 units sq, over 401 10 units sq. insulin glargine (Lantus Solostar U-100 Insulin) 100 unit/mL (3 mL) inpn   No No  
Si units sq qhs and 5 units sq qam  
midodrine (PROAMATINE) 10 mg tablet   Yes No  
mirtazapine (Remeron) 15 mg tablet   No No  
Sig: Take 1 Tab by mouth nightly. potassium chloride (KLOR-CON) 10 mEq tablet   No No  
Sig: Take 1 Tab by mouth daily. sertraline (ZOLOFT) 100 mg tablet   No No  
Sig: Take 1 Tab by mouth daily. tamsulosin (Flomax) 0.4 mg capsule   No No  
Sig: Take 1 Cap by mouth daily. Facility-Administered Medications: None Past Medical History:  
Diagnosis Date  Arthritis  BPH (benign prostatic hyperplasia) 2013  CAD (coronary artery disease) CABG-   DM type 2 (diabetes mellitus, type 2) ([facility] dx 2004 Type 2, avg fbs 250 recognizes hypo at 67,  Dyspnea   
 at times, Uses Albuterol inhaler when needed (last used first of aug 2014)  Gout 1/14/2013  HLD (hyperlipidemia) 1/14/2013  
 HTN (hypertension)   
 controlled with meds  Morbid obesity ([facility] 9/3/14 BMI 41.7  Oxygen dependent 2.5 liters mainly at night, as needed  Psychiatric disorder   
 anxiety, controlled with buspar  Rheumatic fever   
 as a child  Seasonal allergic rhinitis   
 treated with Allegra  Severe aortic valve stenosis   
 echo 09/11/19 EF 60-65%- mild to moderate regurgitation  Thyroid disease   
 hx- no longer takes synthroid  Unspecified sleep apnea 2016  
 no bipap at this time Past Surgical History:  
Procedure Laterality Date  HX CATARACT REMOVAL Bilateral 2012  HX HEART CATHETERIZATION  12/23/2019  HX ORTHOPAEDIC Left   
 carpal tunnel  HX TONSIL AND ADENOIDECTOMY  IR INSERT TUNL CVC W PORT OVER 5 YEARS  2/4/2020  
 removed  VASCULAR SURGERY PROCEDURE UNLIST    
 cabg x 3 with avr  
 
Social History Socioeconomic History  Marital status:  Spouse name: Not on file  Number of children: Not on file  Years of education: Not on file  Highest education level: Not on file Occupational History  Not on file Social Needs  Financial resource strain: Not on file  Food insecurity Worry: Not on file Inability: Not on file  Transportation needs Medical: Not on file Non-medical: Not on file Tobacco Use  Smoking status: Never Smoker  Smokeless tobacco: Never Used Substance and Sexual Activity  Alcohol use: Not Currently Alcohol/week: 0.0 standard drinks  Drug use: Never  Sexual activity: Not on file Lifestyle  Physical activity Days per week: Not on file Minutes per session: Not on file  Stress: Not on file Relationships  Social connections Talks on phone: Not on file Gets together: Not on file Attends Confucianism service: Not on file Active member of club or organization: Not on file Attends meetings of clubs or organizations: Not on file Relationship status: Not on file  Intimate partner violence Fear of current or ex partner: Not on file Emotionally abused: Not on file Physically abused: Not on file Forced sexual activity: Not on file Other Topics Concern  Not on file Social History Narrative  Not on file Family History Problem Relation Age of Onset  Lung Disease Mother   
     copd  Cancer Father   
     lympnode cancer  No Known Problems Brother  Cancer Other   
     fmh lymphoma  Diabetes Other   
     fmhx  Diabetes Maternal Grandfather Allergies Allergen Reactions  Heparin Other (comments) HIT antibody test strongly positive on 1/17/2020. SHARON drawn 1/18/2020 and resulted positive on 1/21/2020  Amoxicillin Rash  Keflex [Cephalexin] Rash Current Facility-Administered Medications Medication Dose Route Frequency  NUTRITIONAL SUPPORT ELECTROLYTE PRN ORDERS   Does Not Apply PRN  
 sodium chloride (NS) flush 5-40 mL  5-40 mL IntraVENous Q8H  
 sodium chloride (NS) flush 5-40 mL  5-40 mL IntraVENous PRN  
 acetaminophen (TYLENOL) tablet 650 mg  650 mg Oral Q4H PRN  
 famotidine (PEPCID) tablet 20 mg  20 mg Oral Q12H  
 albuterol (PROVENTIL VENTOLIN) nebulizer solution 2.5 mg  2.5 mg Nebulization Q6H RT  
 insulin lispro (HUMALOG) injection   SubCUTAneous AC&HS  
 buPROPion (WELLBUTRIN) tablet 75 mg  75 mg Oral BID  ALPRAZolam (XANAX) tablet 0.5 mg  0.5 mg Oral QHS PRN  
 midodrine (PROAMATINE) tablet 10 mg  10 mg Oral TID  OLANZapine (ZyPREXA) tablet 5 mg  5 mg Oral QHS  mirtazapine (REMERON) tablet 15 mg  15 mg Oral QHS  sertraline (ZOLOFT) tablet 100 mg  100 mg Oral DAILY  tamsulosin (FLOMAX) capsule 0.4 mg  0.4 mg Oral DAILY  vancomycin (VANCOCIN) 1750 mg in  ml infusion  1,750 mg IntraVENous Q24H  
 levoFLOXacin (LEVAQUIN) 750 mg in D5W IVPB  750 mg IntraVENous Q48H  clindamycin (CLEOCIN) 600mg D5W 50mL IVPB (premix)  600 mg IntraVENous Q8H  
 furosemide (LASIX) injection 40 mg  40 mg IntraVENous BID  NOREPINephrine (LEVOPHED) 4 mg in 5% dextrose 250 mL infusion  0.5-16 mcg/min IntraVENous TITRATE Objective:  
 
Vitals:  
 05/25/20 0900 05/25/20 0930 05/25/20 1000 05/25/20 1015 BP: 99/58 (!) 87/50 90/53 (!) 86/50 Pulse: 82 83 83 81 Resp: 30 28 27 26 Temp:      
SpO2: 96% 96% 97% 97% Weight:      
Height: PHYSICAL EXAM  
 
Constitutional:  the patient is well developed and in no acute distress, BIPAP 12/6, 40 % sat 97% EENMT:  Sclera clear, pupils equal, oral mucosa moist 
Respiratory: scattered anterior crackles, no wheezing Cardiovascular:  RRR without M,G,R 
Gastrointestinal: soft and non-tender; with positive bowel sounds. Musculoskeletal: warm without cyanosis. There is bilateral 1-2+ lower extremity edema. Skin:  no jaundice or rashes, right and left feet wounds Neurologic: no gross neuro deficits Psychiatric:  alert and oriented x 2 Lines: PIV sites Turner 5/24/20 Drips:     
Levophed 7 mcg/min CXR:   
5/24/20:  Acute CHF exacerbation Recent Labs  
  05/25/20 
0108 05/24/20 
1604 WBC 26.2* 15.6* HGB 8.5* 8.1* HCT 29.1* 26.0*  
 257 INR  --  4.8* Recent Labs  
  05/25/20 
0704 05/25/20 
0108 05/24/20 
1915 05/24/20 
1604 NA  --  138  --  136 K  --  4.8  --  4.0  
CL  --  105  --  100 GLU  --  232*  --  200* CO2  --  25  --  28 BUN  --  32*  --  30* CREA  --  2.16*  --  2.08* MG  --  1.7*  --   --   
CA  --  8.0*  --  8.0*  
TROIQ <0.02* <0.02* <0.02*  --   
ALB  --  1.8*  --  1.9* TBILI  --  0.5  --  0.5 ALT  --  18  --  20 SGOT  --  24  --  24 Recent Labs  
  05/24/20 2036 05/24/20 
1712 PHI 7.326* 7.372 PCO2I 50.0* 47.2*  
PO2I 67* 67* HCO3I 26.1* 27.4* Recent Labs  
  05/25/20 
0108 05/24/20 
1604 LAC 1.6 3.0* Assessment:  (Medical Decision Making) Hospital Problems  Date Reviewed: 5/25/2020 Codes Class Noted POA Chronic respiratory failure with hypoxia (HCC) (Chronic) ICD-10-CM: J96.11 
ICD-9-CM: 518.83, 799.02  5/25/2020 Yes Overview Signed 5/25/2020 10:49 AM by Per Hill NP Home O2 2-3L per NC. Supposed to be on CPAP/? BIPAP at home but stopped using due to wife's illness Lactic acidosis ICD-10-CM: E87.2 ICD-9-CM: 276.2  5/24/2020 Yes Resolved Osteomyelitis ([facility] ICD-10-CM: M86.9 ICD-9-CM: 730.20  5/24/2020 Unknown Likely source of bacteremia Acute CHF (congestive heart failure) (HCC) ICD-10-CM: I50.9 ICD-9-CM: 428.0  5/24/2020 Yes Had CABG/AVR in 1/2020 * (Principal) Severe sepsis ([facility] ICD-10-CM: A41.9, R65.20 ICD-9-CM: 038.9, 995.92  5/24/2020 Yes MRSA in blood DEJUAN (acute kidney injury) ([facility] ICD-10-CM: N17.9 ICD-9-CM: 584.9  5/24/2020 Yes Creatinine stable Gangrenous toe ([facility] ICD-10-CM: S23 
ICD-9-CM: 785.4  5/14/2020 Yes PAD (peripheral artery disease) (HCC) (Chronic) ICD-10-CM: I73.9 ICD-9-CM: 443.9  5/14/2020 Yes Hypoxia ICD-10-CM: R09.02 
ICD-9-CM: 799.02  5/14/2020 Yes Gangrene of toe of both feet ([facility] ICD-10-CM: P65 
ICD-9-CM: 785.4  5/11/2020 Yes Long term (current) use of anticoagulants ICD-10-CM: Z79.01 
ICD-9-CM: V58.61  4/27/2020 Yes Pacemaker (Chronic) ICD-10-CM: Z95.0 ICD-9-CM: V45.01  4/27/2020 Yes Overview Signed 5/6/2020  9:46 PM by Anh Warren MD  
  Dual chamber Biotronik pacemaker placed (1/24/20): AV block post op AVR. DNR (do not resuscitate) (Chronic) ICD-10-CM: Y67 ICD-9-CM: V49.86  2/26/2020 Yes Hypotension (Chronic) ICD-10-CM: I95.9 ICD-9-CM: 458.9  2/18/2020 Unknown HIT (heparin-induced thrombocytopenia) (HCC) (Chronic) ICD-10-CM: P43.83 ICD-9-CM: 289.84  1/23/2020 Yes Atrial fibrillation (HCC) (Chronic) ICD-10-CM: I48.91 
ICD-9-CM: 427.31  1/13/2020 Yes Overview Signed 5/6/2020  9:46 PM by Jesica Moore MD  
  Post op CABG. CAD (coronary artery disease) (Chronic) ICD-10-CM: I25.10 ICD-9-CM: 414.00  1/10/2020 Yes Overview Signed 5/6/2020  1:19 PM by Jesica Moore MD  
  CABG (1/16/20):  LIMA to LAD. SVG to OM. SVG to PDA. S/P CABG x 3 (Chronic) ICD-10-CM: Z95.1 ICD-9-CM: V45.81  1/10/2020 Yes Status post aortic valve replacement ICD-10-CM: Z95.2 ICD-9-CM: V43.3  1/10/2020 Yes Overview Addendum 5/6/2020  9:43 PM by Jesica Moore MD  
  1. AVR (1/16/20):  25 mm Intuity Jon Gutierres Valve. 2.  Echo (4/12/20):  EF 55-60%. Normal functioning AVR. MG 15. PG 26. Mild MR. 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 
ICD-9-CM: 250.40, 583.81  8/26/2019 Yes Obesity, morbid ([address] (Chronic) ICD-10-CM: E66.01 
ICD-9-CM: 278.01  10/19/2018 Yes Gout (Chronic) ICD-10-CM: M10.9 ICD-9-CM: 274.9  1/14/2013 Yes  
   
 BPH (benign prostatic hyperplasia) (Chronic) ICD-10-CM: N40.0 ICD-9-CM: 600.00  1/14/2013 Yes HLD (hyperlipidemia) (Chronic) ICD-10-CM: U12.6 ICD-9-CM: 272.4  1/14/2013 Yes Plan:  (Medical Decision Making) --Albuterol 
--WBC up to 26.2 (was 15.6 on admission) --INR 4.8 on admission--follow, lab pending 
--Clindamycin,Levaquin and Vancomycin day 2--ID consulted --Blood cultures 5/24:  2/2 MRSA 
--Urine culture:  Pending 
--Wound culture:  Pending --Lasix 40 mg IV BID, creat 2.16--required dialysis last admission 
--Right foot X-ray: Soft tissue swelling and irregularity along the amputation plane of the right first through fourth toes.  Osteomyelitis is a consideration. Consider further evaluation with MRI to better evaluate for osteomyelitis. --Cardiology consulted 
--Palliative care consutled More than 50% of the time documented was spent in face-to-face contact with the patient and in the care of the patient on the floor/unit where the patient is located. Thank you very much for this referral.  We appreciate the opportunity to participate in this patient's care. Will follow along with above stated plan. Sonia Patches, NP Lungs:  Decreased BS bilaterally Heart:  RRR with no Murmur/Rubs/Gallops Additional Comments:  Pt on BIPAP overnight and now off. Resume as needed. DNR status noted. States he does not want to be intubated under any circumstances including any surgery. He is concerned that son might reverse his DNR status but this is contrary to his wishes and would not be followed. Palliative care consulted and they can assist pt/family in decision making. I have spoken with and examined the patient. I agree with the above assessment and plan as documented.  
 
Sasha Carmona MD

## 2020-05-25 NOTE — PROGRESS NOTES
Contacted Dr Shine with patient's history, presentation, current labs, ED treatment, and current vital signs. Will continue to monitor patient at this time.

## 2020-05-25 NOTE — CONSULTS
[facility] Consult Date of  Admission: 5/24/2020  4:04 PM  
 
Primary Care Physician: Morenita Billingsley MD 
Primary Cardiologist: Dr Roro Vargas Referring Physician: Dr Candance Isles Consulting Physician: Dr Bruno Odell 
 
CC/Reason for consult: HFpEF Brayden Sadler is a 69 y.o. male with hx of CAD , HIT, A Fib, morbid obesity, osteomyelitis, PPM, CKD on HD, HTN, sleep apnea, and DM type II. The patient was admitted to the hospital on 5/24/2020 after having a near syncople episode on the tolite and EMS transported him to the ED. EMS reported a fever and generlized SOB for a few days prior to arrival. He was noted to have elevated L. Acid levels were found to be 3, Hgb of 8, NT Pro BNP of 4k, WBC of 26.2 w/ 24.2 Abs Neutrophils and Dejuan on CKD. He was also noted to be confused and hypoxic in the ED where he received BiPAP at his request recended his DNR, Turner, and IV lasix. He was also noted to be hypotensive and started on IV Levophed. The patient had a recent surgery by Dr Rusty Tomlin on may 15 for removal of toes due to gangrene. With S/S of sepsis the patient was started on IV ABX with 2.5 Lf of fluid given by bolus. The patient is noted do be DEJUAN from his baseline CKD. Currently the patient does not want to have his leg removed when talking to him. If he needs surgery he wants to be a DNR during surgery. Currently the patient is very SOB, no CP, no palpitation, mild LLE, no JVD, with lethargy noted. Review of Echos from the past show all normal EF with no noted diastolic dysfunction. Recent Cardiac Synopsis w/ Labs CABG (1/16/20):  LIMA to LAD. SVG to OM. SVG to PDA. Echo: 4/2020 -  Left ventricle: Systolic function was normal. Ejection fraction was 
estimated in the range of 55 % to 60 %. There were no regional wall motion 
abnormalities. Avg. E/e': (15.1).  Left ventricular diastolic function 
parameters were normal. 
  -  Aortic valve: A 25mm Intuity Jon Gutireres valve (1/10/20) was Present with normal bioprosthetic function. The mean pressure gradient was 15 mmHg. 
 -  Mitral valve: There was mild regurgitation. EKG: NSR Past Medical History:  
Diagnosis Date  Arthritis  BPH (benign prostatic hyperplasia) 1/14/2013  CAD (coronary artery disease) CABG- 1-2020  DM type 2 (diabetes mellitus, type 2) ([address] dx 2004 Type 2, avg fbs 250 recognizes hypo at 66,  Dyspnea   
 at times, Uses Albuterol inhaler when needed (last used first of aug 2014)  Gout 1/14/2013  HLD (hyperlipidemia) 1/14/2013  
 HTN (hypertension)   
 controlled with meds  Morbid obesity ([address] 9/3/14 BMI 41.7  Oxygen dependent 2.5 liters mainly at night, as needed  Psychiatric disorder   
 anxiety, controlled with buspar  Rheumatic fever   
 as a child  Seasonal allergic rhinitis   
 treated with Allegra  Severe aortic valve stenosis   
 echo 09/11/19 EF 60-65%- mild to moderate regurgitation  Thyroid disease   
 hx- no longer takes synthroid  Unspecified sleep apnea 2016  
 no bipap at this time Past Surgical History:  
Procedure Laterality Date  HX CATARACT REMOVAL Bilateral 2012  HX HEART CATHETERIZATION  12/23/2019  HX ORTHOPAEDIC Left   
 carpal tunnel  HX TONSIL AND ADENOIDECTOMY  IR INSERT TUNL CVC W PORT OVER 5 YEARS  2/4/2020  
 removed  VASCULAR SURGERY PROCEDURE UNLIST    
 cabg x 3 with avr Allergies Allergen Reactions  Heparin Other (comments) HIT antibody test strongly positive on 1/17/2020. SHARON drawn 1/18/2020 and resulted positive on 1/21/2020  Amoxicillin Rash  Keflex [Cephalexin] Rash Family History Problem Relation Age of Onset  Lung Disease Mother   
     copd  Cancer Father   
     lympnode cancer  No Known Problems Brother  Cancer Other   
     fmh lymphoma  Diabetes Other   
     fmhx  Diabetes Maternal Grandfather Social History Socioeconomic History  Marital status:  Spouse name: Not on file  Number of children: Not on file  Years of education: Not on file  Highest education level: Not on file Occupational History  Not on file Social Needs  Financial resource strain: Not on file  Food insecurity Worry: Not on file Inability: Not on file  Transportation needs Medical: Not on file Non-medical: Not on file Tobacco Use  Smoking status: Never Smoker  Smokeless tobacco: Never Used Substance and Sexual Activity  Alcohol use: Not Currently Alcohol/week: 0.0 standard drinks  Drug use: Never  Sexual activity: Not on file Lifestyle  Physical activity Days per week: Not on file Minutes per session: Not on file  Stress: Not on file Relationships  Social connections Talks on phone: Not on file Gets together: Not on file Attends Latter day service: Not on file Active member of club or organization: Not on file Attends meetings of clubs or organizations: Not on file Relationship status: Not on file  Intimate partner violence Fear of current or ex partner: Not on file Emotionally abused: Not on file Physically abused: Not on file Forced sexual activity: Not on file Other Topics Concern  Not on file Social History Narrative  Not on file Current Facility-Administered Medications Medication Dose Route Frequency  NUTRITIONAL SUPPORT ELECTROLYTE PRN ORDERS   Does Not Apply PRN  
 meropenem (MERREM) 500 mg in 0.9% sodium chloride (MBP/ADV) 50 mL  0.5 g IntraVENous Q8H  
 [START ON 5/26/2020] famotidine (PEPCID) tablet 20 mg  20 mg Oral DAILY  sodium chloride (NS) flush 5-40 mL  5-40 mL IntraVENous Q8H  
 sodium chloride (NS) flush 5-40 mL  5-40 mL IntraVENous PRN  
 acetaminophen (TYLENOL) tablet 650 mg  650 mg Oral Q4H PRN  
  albuterol (PROVENTIL VENTOLIN) nebulizer solution 2.5 mg  2.5 mg Nebulization Q6H RT  
 insulin lispro (HUMALOG) injection   SubCUTAneous AC&HS  
 buPROPion (WELLBUTRIN) tablet 75 mg  75 mg Oral BID  ALPRAZolam (XANAX) tablet 0.5 mg  0.5 mg Oral QHS PRN  
 midodrine (PROAMATINE) tablet 10 mg  10 mg Oral TID  OLANZapine (ZyPREXA) tablet 5 mg  5 mg Oral QHS  mirtazapine (REMERON) tablet 15 mg  15 mg Oral QHS  sertraline (ZOLOFT) tablet 100 mg  100 mg Oral DAILY  tamsulosin (FLOMAX) capsule 0.4 mg  0.4 mg Oral DAILY  vancomycin (VANCOCIN) 1750 mg in  ml infusion  1,750 mg IntraVENous Q24H  clindamycin (CLEOCIN) 600mg D5W 50mL IVPB (premix)  600 mg IntraVENous Q8H  
 furosemide (LASIX) injection 40 mg  40 mg IntraVENous BID  NOREPINephrine (LEVOPHED) 4 mg in 5% dextrose 250 mL infusion  0.5-16 mcg/min IntraVENous TITRATE Review of Systems Constitution: Positive for fever and malaise/fatigue. Negative for weight gain and weight loss. HENT: Negative. Eyes: Negative. Cardiovascular: Positive for dyspnea on exertion and leg swelling. Negative for chest pain (currently pain free), claudication, cyanosis, irregular heartbeat, near-syncope, orthopnea, palpitations, paroxysmal nocturnal dyspnea and syncope. Respiratory: Positive for shortness of breath. Negative for cough and wheezing. Endocrine: Negative. Skin: Negative. Musculoskeletal: Negative. Gastrointestinal: Negative for nausea and vomiting. Genitourinary: Negative. Neurological: Negative for dizziness. Psychiatric/Behavioral: Negative. Allergic/Immunologic: Negative. Physical Exam 
Vitals:  
 05/25/20 1200 05/25/20 1215 05/25/20 1230 05/25/20 1245 BP: 100/59 101/55 102/56 101/56 Pulse: 82 83 81 83 Resp: 30 (!) 34 (!) 33 (!) 39 Temp:      
SpO2: 93% 94% 98% 98% Weight:      
Height:      
 
 
Wt Readings from Last 3 Encounters:  
05/24/20 136.5 kg (300 lb 14.9 oz) 05/22/20 114 kg (251 lb 6.4 oz) 05/19/20 113.8 kg (250 lb 12.8 oz) Intake/Output Summary (Last 24 hours) at 5/25/2020 1357 Last data filed at 5/25/2020 1228 Gross per 24 hour Intake 655 ml Output 350 ml Net 305 ml Physical Exam: 
General: Well Developed, Well Nourished, No Acute Distress HEENT: pupils equal and round, no abnormalities noted Neck: supple, no JVD, no carotid bruits Heart: S1S2 with RRR without murmurs or gallops Lungs:Decreased hypervolume on Abd: soft, nontender, nondistended, with good bowel sounds Ext: warm,calves supple/nontender, pulses 2+ bilaterally, noted post op toes, trace edema above SCD's 
Skin: warm and dry Psychiatric: Appears depressed Neurologic: Alert and oriented X 3 but lathargic Labs:  
Recent Labs  
  05/25/20 
1051 05/25/20 
0108 05/24/20 
1604 NA  --  138 136 K  --  4.8 4.0 MG  --  1.7*  --   
BUN  --  32* 30* CREA  --  2.16* 2.08* GLU  --  232* 200* WBC  --  26.2* 15.6* HGB  --  8.5* 8.1* HCT  --  29.1* 26.0*  
PLT  --  234 257 INR 7.4*  --  4.8* BNPNT  --   --  5,671* Echo Results  (Last 48 hours) None Xr Foot Rt Ap/lat Result Date: 5/24/2020 IMPRESSION: Soft tissue swelling and irregularity along the amputation plane of the right first through fourth toes. Osteomyelitis is a consideration. Consider further evaluation with MRI to better evaluate for osteomyelitis. Xr Chest Alyssia Climes Result Date: 5/24/2020 IMPRESSION: Acute CHF exacerbation. Assessment/Plan: 
 
 Assessment:  
  
Principal Problem: 
  Severe sepsis ([facility] (5/24/2020) Per Primary Team 
 
Active Problems: 
  Gout (1/14/2013) Per Primary Team 
 
  BPH (benign prostatic hyperplasia) (1/14/2013) Per Primary Team 
 
  HLD (hyperlipidemia) (1/14/2013) Statin held due to elevated LFT's Obesity, morbid ([address] (10/19/2018) Per Primary team encourage healthy lifestyle choices Type 2 diabetes with nephropathy ([address] (8/26/2019) Per Primary Team 
 
  CAD (coronary artery disease) (1/10/2020) Overview: CABG (1/16/20):  LIMA to LAD. SVG to OM. SVG to PDA. Pt has no room in BP for OMT, Needs ASA and statin held due to elevated LFT's 
 
  Status post aortic valve replacement (1/10/2020) Overview: 1. AVR (1/16/20):  25 mm Intuity Jon Gutierres Valve. 2.  Echo (4/12/20):  EF 55-60%. Normal functioning AVR. MG 15. PG 26. Mild MR. Atrial fibrillation ([address] (1/13/2020) Overview: Post op CABG. Currently A Fib on [facility] may need to be stopped for surgery, will default to surgery HIT (heparin-induced thrombocytopenia) (HCC) (1/23/2020) Hx of Hypotension (2/18/2020) On current pressors, was on midodrine as outpatient DNR (do not resuscitate) (2/26/2020) Per Primary Team 
 
  Long term (current) use of anticoagulants (4/27/2020) Will default to surgery for hold times will need to be restarted post surgery Pacemaker (4/27/2020) Overview: Dual chamber Biotronik pacemaker placed (1/24/20): AV block post op AVR. No problems noted Gangrene of toe of both feet ([address] (5/11/2020) Per ID and Vascular Gangrenous toe ([address] (5/14/2020)  Per Vascular PAD (peripheral artery disease) ([address] (5/14/2020) Per Vascular Hypoxia (5/14/2020) Per Pulmonary on BiPAP Lactic acidosis (5/24/2020) per Primary Team 
 
  Osteomyelitis ([address] (5/24/2020) Per ID and Vascular DEJUAN (acute kidney injury) ([address] (5/24/2020) Per Primary Team worse than baseline Chronic respiratory failure with hypoxia ([address] (5/25/2020) Per Pulmonary still on Overview: Home O2 2-3L per NC.  
 
  Pulmonary edema, noncardiac (5/25/2020) Normal EF by Echo, No CP, No diastolic dysfunction noted on multiple Echos, Worse renal function from baseline, Received 2.5 L of fluid for initial sepsis, agree with IV diuresis for now. May Need IV diuresis but has poor renal function. Further recommendations pending clinical course and attending recommendations. When from [facility] per pul. Thank you very much for this referral. We appreciate the opportunity to participate in this patient's care. We will follow along with above stated plan. Lynette Shine NP Consulting MD:  Dr Jase Lainez

## 2020-05-25 NOTE — PROGRESS NOTES
TRANSFER - IN REPORT: 
 
Verbal report received from Go Valentine RN(name) on Amanda Macario  being received from Lars Mi RN(unit) for routine progression of care Report consisted of patients Situation, Background, Assessment and  
Recommendations(SBAR). Information from the following report(s) SBAR, ED Summary, Intake/Output, MAR, Recent Results, Med Rec Status and Cardiac Rhythm Sinus Tach, NSR was reviewed with the receiving nurse. Opportunity for questions and clarification was provided. Assessment completed upon patients arrival to unit and care assumed.

## 2020-05-26 PROBLEM — R65.21 SEVERE SEPSIS WITH SEPTIC SHOCK (HCC): Status: ACTIVE | Noted: 2020-01-01

## 2020-05-26 NOTE — ANESTHESIA PREPROCEDURE EVALUATION
Relevant Problems No relevant active problems Anesthetic History No history of anesthetic complications Review of Systems / Medical History Patient summary reviewed, nursing notes reviewed and pertinent labs reviewed Pulmonary Sleep apnea Shortness of breath Neuro/Psych Psychiatric history Cardiovascular Hypertension Valvular problems/murmurs CAD, PAD and hyperlipidemia Exercise tolerance: <4 METS Comments: S/p CABG AVR Jan 7526, complicated by ARF post op GI/Hepatic/Renal 
  
 
 
Renal disease (Acute on chronic kidney disease) Endo/Other Diabetes: poorly controlled, type 2 Hypothyroidism: well controlled Morbid obesity and arthritis Other Findings Comments: Sepsis on admission with gangrenous foot, MRSA Physical Exam 
 
Airway Mallampati: II 
 
 
Mouth opening: Normal 
 
 Cardiovascular Dental 
No notable dental hx Pulmonary Abdominal 
 
 
 
 Other Findings Anesthetic Plan ASA: 4 Anesthesia type: general 
 
 
Post-op pain plan if not by surgeon: peripheral nerve block single Induction: Intravenous Anesthetic plan and risks discussed with: Patient Patient agreeable to popliteal/adductor block for postoperative analgesia

## 2020-05-26 NOTE — WOUND CARE
Patient seen for bilateral foot wounds (post toe amputations). Cleansed with wound cleanser and dry dressings applied. Right foot has great through 4th toes amputated previously with dry black crusted line and sutures in place. Small amount of drainage. Left foot has black shallow ulcer on great toe 3.5x4.5x0 cm and 2-3rd toes partially amputated and sutured. Heel suspension boots on bilaterally. Is in ICU and is asking vascular surgery to see him for surgical option decision. Wound team will assist if needed further.

## 2020-05-26 NOTE — PROGRESS NOTES
[facility] CARDIOLOGY PROGRESS NOTE 
      
 
5/26/2020 4:57 PM 
 
Admit Date: 5/24/2020 Subjective:  
Patient denies any chest pain or dyspnea. Now willing to have surgery if needed. Hemodynamics stable. ROS: 
Cardiovascular:  As noted above Objective:  
  
Vitals:  
 05/26/20 1200 05/26/20 1300 05/26/20 1345 05/26/20 1505 BP: 110/57 98/54 101/55 113/67 Pulse: 85 81 82 88 Resp: 27 26 23 22 Temp: 97.8 °F (36.6 °C)   98.3 °F (36.8 °C) SpO2: 93% 93% 94% 92% Weight:      
Height:      
 
 
Physical Exam: 
General-No Acute Distress Neck- supple, no JVD 
CV- regular rate and rhythm no MRG Lung- clear bilaterally Abd- soft, nontender, nondistended Ext- trivial edema bilaterally. Skin- warm and dry Data Review:  
Recent Labs  
  05/26/20 
1525 05/26/20 
8949 05/26/20 
7626  05/25/20 
0108 05/24/20 
1604 NA  --   --   --   --  138 136 K  --   --   --   --  4.8 4.0 MG  --   --   --   --  1.7*  --   
BUN  --   --   --   --  32* 30* CREA  --   --   --   --  2.16* 2.08* GLU  --   --   --   --  232* 200* WBC 21.2*  --   --   --  26.2* 15.6* HGB 7.0*  --   --   --  8.5* 8.1* HCT 23.3*  --   --   --  29.1* 26.0*  
  --   --   --  234 257 INR  --  7.7* >9.0*   < >  --  4.8*  
 < > = values in this interval not displayed. No results found for: JENNA Fitch Assessment/Plan:  
 
Principal Problem: 
  Severe sepsis with septic shock ([facility] (5/24/2020) Continue antibiotics. Vascular to see. Active Problems: 
   Obesity, morbid ([facility] (10/19/2018) Noted. Needs weight loss. Type 2 diabetes with nephropathy ([address] (8/26/2019) Check BMP in AM. CAD (coronary artery disease) (1/10/2020) No angina. Status post aortic valve replacement (1/10/2020) Echo today. Atrial fibrillation ([facility] (1/13/2020) Hold coumadin for possible procedures. HIT (heparin-induced thrombocytopenia) ([facility] (1/23/2020) On coumadin. Avoid heparin. Hypotension (2/18/2020) Monitor with treating sepsis. DNR (do not resuscitate) (2/26/2020) Noted. Acute on chronic respiratory failure with hypoxia ([facility] (3/8/2020) On Lasix. Echo ordered. DAily BMP. Pacemaker (4/27/2020) STable.    
 
   
 
 
 
 
Courtney Valerio MD 
5/26/2020 4:57 PM

## 2020-05-26 NOTE — PROGRESS NOTES
Hospitalist Note Admit Date:  2020  4:04 PM  
Name:  Gay Musa Age:  69 y.o. 
:  1945 MRN:  [id number] PCP:  Fallon Hobbs MD 
Treatment Team: Attending Provider: Jose Bates MD; Consulting Provider: Sharan Carrero MD; Consulting Provider: Gabo Puga MD; Consulting Provider: Gregorio Salamanca MD; Consulting Provider: Fei Strickland MD; Utilization Review: Teena Jeffers RN; Consulting Provider: Marta Gould NP; Care Manager: Ynes Bhandari RN 
 
HPI/Subjective:  
Gay Musa is a 69 y.o. male with past medical history significant for DVT, HIT, A. fib, morbid obesity, osteomyelitis, CKD, hemodialysis, hypertension, sleep apnea and type 2 diabetes who was brought in by EMS after heaving a near syncopal episode at home. He was admitted to ICU due to acute respiratory failure, severe sepsis with lactic acid of 3, hypotension, BIPAP and pressor support. The patient had a recent surgery by Dr Lilian Peña on may 15 for removal of toes due to gangrene. Initially patient does not want to be intubated even for surgical procedure and was refusing amputation. Palliative care spoke with the patient on  and patient is now agreeable to amputation and intubation during surgical procedure. : 
Patient is seen and examined at bedside. Patient is AAOx3 but weak. Expressed that he is willing to agree to amputation. He also states he has not been wearing bipap as he takes care of his wife and BIPAP is too loud to hear her. But he is now willing to be complaint with his Bipap. He is short of breath when speaking but denies any chest pains. Objective:  
 
Patient Vitals for the past 24 hrs: 
 Temp Pulse Resp BP SpO2  
20 1505 98.3 °F (36.8 °C) 88 22 113/67 92 % 20 1345  82 23 101/55 94 % 20 1300  81 26 98/54 93 % 20 1200 97.8 °F (36.6 °C) 85 27 110/57 93 % 20 1100  85 23 106/57 93 % 05/26/20 1030  89 24 111/59 92 % 05/26/20 1000  90 17 104/57 91 % 05/26/20 0900  86 29 107/58 93 % 05/26/20 0815  84 15 98/55 93 % 05/26/20 0800  83 28 96/55 92 % 05/26/20 0745  86 26 101/55 94 % 05/26/20 0730 98 °F (36.7 °C) 85 27 95/53 96 % 05/26/20 0715  85 23 105/62 100 % 05/26/20 0710     98 % 05/26/20 0700  80 29 (!) 88/55 96 % 05/26/20 0629  84 27 92/50 97 % 05/26/20 0615  84 29 90/50 97 % 05/26/20 0600  84 (!) 36 91/55 97 % 05/26/20 0544  82 (!) 33 90/55 97 % 05/26/20 0529  84 (!) 35 (!) 87/53 97 % 05/26/20 0515  84 (!) 32 91/54 97 % 05/26/20 0500  85 (!) 33 91/55 98 % 05/26/20 0444  84 26 91/50 98 % 05/26/20 0429  85 28 93/50 100 % 05/26/20 0415  85 27 90/50 98 % 05/26/20 0400  83 (!) 31 (!) 87/54 99 % 05/26/20 0344  86 (!) 32 (!) 87/51 97 % 05/26/20 0329  86 29 93/54 99 % 05/26/20 0315 98.1 °F (36.7 °C) 84 28 100/58 97 % 05/26/20 0244  85 29 98/54 97 % 05/26/20 0229  85 (!) 32 92/51 97 % 05/26/20 0215  85 (!) 32 95/51 97 % 05/26/20 0200  85 30 90/54 97 % 05/26/20 0144  85 29 98/54 98 % 05/26/20 0132 99.3 °F (37.4 °C)      
05/26/20 0129  93 (!) 42 102/55 98 % 05/26/20 0124  90 (!) 34 97/55 98 % 05/26/20 0115     97 % 05/26/20 0114  86 (!) 48 98/57 98 % 05/26/20 0100  86 (!) 32 99/58 97 % 05/26/20 0050  86 30 96/55 97 % 05/26/20 0044  86 30 96/55 97 % 05/26/20 0030 100 °F (37.8 °C) 88 30 99/57 97 % 05/26/20 0015  86 30 96/51 96 % 05/26/20 0001     97 % 05/25/20 2359  87 (!) 32 99/56 97 % 05/25/20 2345  86 (!) 34  98 % 05/25/20 2344  87 (!) 33 95/50 97 % 05/25/20 2330  87 30 93/54 96 % 05/25/20 2315  85 27 98/57 96 % 05/25/20 2259  86 30 96/54 95 % 05/25/20 2244  85 (!) 31 99/55 97 % 05/25/20 2230 98.6 °F (37 °C) 86 (!) 31 102/56 95 % 05/25/20 2215  86 (!) 46 110/60 98 % 05/25/20 2159  86 27 106/59 99 % 05/25/20 2144  86 (!) 37 102/57 97 % 05/25/20 2130  87 (!) 48 99/54 98 % 05/25/20 2115  89 26 96/54 99 % 05/25/20 2059  90 27 97/55 97 % 05/25/20 2044  90 (!) 31 93/50 97 % 05/25/20 2030  89 28 106/59 97 % 05/25/20 2023     97 % 05/25/20 2015  91 (!) 32 105/55 94 % 05/25/20 2011  93 (!) 31 108/57 93 % 05/25/20 1944  90 29 103/63 97 % 05/25/20 1930  87 30 102/56 98 % 05/25/20 1915 98.6 °F (37 °C) 87 25 103/57 98 % 05/25/20 1859  91 28 110/58 98 % 05/25/20 1815  90 27 111/57 96 % 05/25/20 1800  86 11 97/56 95 % 05/25/20 1745  85 25 108/59 97 % 05/25/20 1730  84 29 105/59 95 % 05/25/20 1700  85 (!) 31 109/60 91 % 05/25/20 1645  90 (!) 31 112/56 90 % 05/25/20 1630  82 (!) 33 98/56 98 % Oxygen Therapy O2 Sat (%): 92 % (05/26/20 1505) Pulse via Oximetry: 84 beats per minute (05/26/20 1345) O2 Device: Hi flow nasal cannula (05/26/20 0730) O2 Flow Rate (L/min): 6 l/min (05/26/20 1350) FIO2 (%): 40 % (05/26/20 0710) Estimated body mass index is 43.53 kg/m² as calculated from the following: 
  Height as of this encounter: 5' 10\" (1.778 m). Weight as of this encounter: 137.6 kg (303 lb 5.7 oz). Intake/Output Summary (Last 24 hours) at 5/26/2020 1627 Last data filed at 5/26/2020 1259 Gross per 24 hour Intake 1023.12 ml Output 1650 ml Net -626.88 ml *Note that automatically entered I/Os may not be accurate; dependent on patient compliance with collection and accurate  by [employer]. Physical Exam:  
General:     alert, awake,Ill appearing. Obese Head:   normocephalic, atraumatic Eyes, Ears, nose: PERRL, EOMI. Normal conjunctiva Neck:    supple, non-tender. Trachea midline. Lungs:   Coarse breath sounds. Cardiac:   RRR, Normal S1 and S2. Abdomen:   Soft, non distended, nontender, +BS, no guarding/rebound Extremities:   Warm, dry. 2+ edema. Rt toe wound wrapped in gauze. (Per vascular \"pus at great toe amputation site). Skin:   No rashes, no jaundice Neuro: AAOx3. No gross focal neurological deficit Psychiatric:  No anxiety, calm, cooperative Data Review: 
I have reviewed all labs, meds, and studies from the last 24 hours: 
 
Recent Results (from the past 24 hour(s)) GLUCOSE, POC Collection Time: 05/25/20 10:07 PM  
Result Value Ref Range Glucose (POC) 253 (H) 65 - 100 mg/dL PROTHROMBIN TIME + INR Collection Time: 05/26/20  3:18 AM  
Result Value Ref Range Prothrombin time 79.6 (H) 12.0 - 14.7 sec INR >9.0 (HH) GLUCOSE, POC Collection Time: 05/26/20  7:14 AM  
Result Value Ref Range Glucose (POC) 183 (H) 65 - 100 mg/dL PROTHROMBIN TIME + INR Collection Time: 05/26/20  7:37 AM  
Result Value Ref Range Prothrombin time 67.4 (H) 12.0 - 14.7 sec INR 7.7 (HH) GLUCOSE, POC Collection Time: 05/26/20 11:10 AM  
Result Value Ref Range Glucose (POC) 204 (H) 65 - 100 mg/dL All Micro Results Procedure Component Value Units Date/Time CULTURE, BLOOD [[id number]] Collected:  05/25/20 1543 Order Status:  Completed Specimen:  Blood Updated:  05/26/20 1238 Special Requests: --     
  RIGHT Antecubital 
  
  Culture result: NO GROWTH AFTER 20 HOURS     
 CULTURE, BLOOD [[id number]] Collected:  05/25/20 1543 Order Status:  Completed Specimen:  Blood Updated:  05/26/20 1238 Special Requests: --     
  RIGHT 
HAND Culture result: NO GROWTH AFTER 19 HOURS     
 CULTURE, URINE [[id number]]  (Abnormal) Collected:  05/24/20 1900 Order Status:  Completed Specimen:  Cath Urine Updated:  05/26/20 1028 Special Requests: NO SPECIAL REQUESTS Culture result:    
  6000 COLONIES/mL YEAST SUBCULTURE IN PROGRESS  
     
 CULTURE, Katherine Delgadon STAIN [[id number]]  (Abnormal) Collected:  05/25/20 0200 Order Status:  Completed Specimen:  Wound from Foot Updated:  05/26/20 1501   Special Requests: NO SPECIAL REQUESTS     
  GRAM STAIN NO WBCS SEEN     
   NO EPITHELIAL CELLS SEEN     
      
 MODERATE GRAM POSITIVE COCCI MODERATE GRAM NEGATIVE RODS Culture result: MODERATE GRAM NEGATIVE RODS SUBCULTURE IN PROGRESS  
     
      
  CULTURE IN PROGRESS,FURTHER UPDATES TO FOLLOW CULTURE, BLOOD [[id number]]  (Abnormal) Collected:  05/24/20 1604 Order Status:  Completed Specimen:  Blood Updated:  05/26/20 0750 Special Requests: RIGHT ANTECUBITAL     
  GRAM STAIN    
  GRAM POS COCCI IN CLUSTERS  
     
      
  AEROBIC AND ANAEROBIC BOTTLES  
     
      
  CRITICAL RESULT NOT CALLED DUE TO PREVIOUS NOTIFICATION OF CRITICAL RESULT WITHIN THE LAST 24 HOURS. Culture result: STAPHYLOCOCCUS AUREUS     
      
  CULTURE IN PROGRESS,FURTHER UPDATES TO FOLLOW CULTURE, BLOOD [[id number]]  (Abnormal) Collected:  05/24/20 1604 Order Status:  Completed Specimen:  Blood Updated:  05/26/20 0192 Special Requests: LEFT ANTECUBITAL     
  GRAM STAIN GRAM POSITIVE COCCI AEROBIC AND ANAEROBIC BOTTLES RESULTS VERIFIED, PHONED TO AND READ BACK BY Andreea Pineda @ 0503 5/25/20 MM Culture result: STAPHYLOCOCCUS AUREUS SENSITIVITY TO FOLLOW REFER TO BIOFIRE  PANEL ACC [id number] BLOOD CULTURE ID PANEL [[id number]]  (Abnormal) Collected:  05/24/20 1604 Order Status:  Completed Specimen:  Blood Updated:  05/25/20 1712 Acc. no. from [employer] [id number] Staphylococcus Detected Staphylococcus aureus Detected Comment: RESULTS VERIFIED, PHONED TO AND READ BACK BY 
Darline Oliva RN @ 5973 ON 5/25/2020 AK. mecA (Methicillin-Resistance Genes) Detected Comment: Presence of mecA is highly indicative of methicillin resistance. The test does not replace traditional culture and susceptibilities RESULTS VERIFIED, PHONED TO AND READ BACK BY 
Darline Oliva RN @ 9497 ON 05/25/2020 AK.   INTERPRETATION    
 Gram positive cocci in clusters, identified by realtime PCR as SUSPECTED MRSA. Comment: Recommend IV vancomycin use, unlikely to be a contaminant. Infectious Diseases Consult recommended in adult patients. THIS TEST DOES NOT REPLACE SENSITIVITY TESTING. Current Meds: 
Current Facility-Administered Medications Medication Dose Route Frequency  insulin glargine (LANTUS) injection 25 Units  25 Units SubCUTAneous QHS  [START ON 5/27/2020] vancomycin trough reminder   Other ONCE  
 NUTRITIONAL SUPPORT ELECTROLYTE PRN ORDERS   Does Not Apply PRN  
 meropenem (MERREM) 500 mg in 0.9% sodium chloride (MBP/ADV) 50 mL  0.5 g IntraVENous Q8H  
 famotidine (PEPCID) tablet 20 mg  20 mg Oral DAILY  sodium chloride (NS) flush 5-40 mL  5-40 mL IntraVENous Q8H  
 sodium chloride (NS) flush 5-40 mL  5-40 mL IntraVENous PRN  
 acetaminophen (TYLENOL) tablet 650 mg  650 mg Oral Q4H PRN  
 albuterol (PROVENTIL VENTOLIN) nebulizer solution 2.5 mg  2.5 mg Nebulization Q6H RT  
 insulin lispro (HUMALOG) injection   SubCUTAneous AC&HS  
 buPROPion (WELLBUTRIN) tablet 75 mg  75 mg Oral BID  ALPRAZolam (XANAX) tablet 0.5 mg  0.5 mg Oral QHS PRN  
 midodrine (PROAMATINE) tablet 10 mg  10 mg Oral TID  OLANZapine (ZyPREXA) tablet 5 mg  5 mg Oral QHS  mirtazapine (REMERON) tablet 15 mg  15 mg Oral QHS  sertraline (ZOLOFT) tablet 100 mg  100 mg Oral DAILY  tamsulosin (FLOMAX) capsule 0.4 mg  0.4 mg Oral DAILY  vancomycin (VANCOCIN) 1750 mg in  ml infusion  1,750 mg IntraVENous Q24H  clindamycin (CLEOCIN) 600mg D5W 50mL IVPB (premix)  600 mg IntraVENous Q8H  
 furosemide (LASIX) injection 40 mg  40 mg IntraVENous BID  NOREPINephrine (LEVOPHED) 4 mg in 5% dextrose 250 mL infusion  0.5-16 mcg/min IntraVENous TITRATE Other Studies: 
 
Xr Foot Rt Ap/lat Result Date: 5/24/2020 Right foot CLINICAL INDICATION: Right foot swelling and erythema, recent toe amputations FINDINGS: Two views of the right foot show amputation across the MTP joints of the first through fourth toes. There are small bone fragments in the amputation plane with irregular soft tissue swelling and thickening noted distally at the level the amputation. There is irregularity along the bony cortices of the first, second, and third metatarsal heads. Osteomyelitis cannot be excluded by plain radiography. The fifth toe is unremarkable. IMPRESSION: Soft tissue swelling and irregularity along the amputation plane of the right first through fourth toes. Osteomyelitis is a consideration. Consider further evaluation with MRI to better evaluate for osteomyelitis. Xr Chest [facility] Result Date: 5/24/2020 Portable chest x-ray CLINICAL INDICATION: Dyspnea FINDINGS: Single AP view of the chest compared to a similar exam dated 5/15/2020 shows stable bilateral pulmonary edema pattern. A pacer device is in place. Postcardiac valve replacement changes evident. Small bilateral pleural effusions suspected. IMPRESSION: Acute CHF exacerbation. Assessment and Plan: Active Hospital Problems Diagnosis Date Noted  Pulmonary edema, noncardiac 05/25/2020  Lactic acidosis 05/24/2020  Osteomyelitis ([address] 05/24/2020  Severe sepsis with septic shock ([address] 05/24/2020  DEJUAN (acute kidney injury) ([address] 05/24/2020  PAD (peripheral artery disease) ([address] 05/14/2020  Hypoxia 05/14/2020  Gangrenous toe ([address] 05/14/2020  Gangrene of toe of both feet ([address] 05/11/2020  Pacemaker 04/27/2020 Dual chamber Biotronik pacemaker placed (1/24/20): AV block post op AVR.  Long term (current) use of anticoagulants 04/27/2020  Acute on chronic respiratory failure with hypoxia ([address] 03/08/2020  DNR (do not resuscitate) 02/26/2020  Hypotension 02/18/2020  
 HIT (heparin-induced thrombocytopenia) ([facility]) 01/23/2020  Atrial fibrillation ([address] 01/13/2020 Post op CABG.  Status post aortic valve replacement 01/10/2020 1. AVR (1/16/20):  25 mm Intuity Jon Gutierres Valve. 2.  Echo (4/12/20):  EF 55-60%. Normal functioning AVR. MG 15. PG 26. Mild MR.  
  
 S/P CABG x 3 01/10/2020  CAD (coronary artery disease) 01/10/2020 CABG (1/16/20):  LIMA to LAD. SVG to OM. SVG to PDA.  Type 2 diabetes with nephropathy ([facility] 08/26/2019  Obesity, morbid ([facility] 10/19/2018  HLD (hyperlipidemia) 01/14/2013  Gout 01/14/2013  BPH (benign prostatic hyperplasia) 01/14/2013 Plan: 
 
Acute on chronic respiratory failure with hypoxia due to pulm edema and sepsis - Pulm edema likely non cardiogenic as EF is normal by Echo. Likely due to renal disease with 2.5L fluid IVF in ED for sepsis. - IV lasix 40mg BID but need to monitor renal function 
- O2 supplement (home 2-3L NC) and wean as tolerated - Cardiology following - BiPap at night time Severe Sepsis with shock due to gangrene MRSA bacteremia Rt foot infection with possible osteomyelitis - Right foot X-ray: Soft tissue swelling and irregularity along the amputation plane of the right first through fourth toes. - Wound cx (5/24) shows GPC and GNRs 
- TTE is neg for vegitation. TERRI not ideal at this time due to respiratory status. - ID following: IV clindamycin , IV vancomycin, IV Meropenem  
- pending repeat BCx 
- NM bone scan - Rt foot debridement by Vascular Surgery CAD s/p CABG and PPM  
pAfib S/p AVR (1/2020) HTN // HLD 
- hold anti HTN due to hypotension and sepsis 
- holding coumadin due to supra-theraputic INR Supratherapeutic INR 
- INR of 7.4 without any signs of bleeding. 
- hold warfarin - s/p 2 vit K DEJUAN on CKD Stage 4 (baseline Cr. 1.5 - 1.8 in 5/2020) - Cr of 2.16 today 
- monitor given IV lasix 
- no IVF given pulm edema T2DM 
- lantus 25units qHS and ISS 
- diabetic diet BPH: c/w home meds Diet:  DIET RENAL 
DIET NPO 
DVT PPx: SCDs; INR supratherapeutic Code: DNR Medical Decision Making: 
 
Labs/Imaging reviewed. Additional information obtained from nursing staff. Patient is high risk due to medical condition and comorbidities. In addition, requires monitoring due to receiving medications with high toxic profiles. Plan discussed with nursing staff. Plan discussed with patient/family. All questions/concerns were addressed. Pt/family agrees with the plan. Signed By: Emmanuel Mesa MD   
 May 26, 2020

## 2020-05-26 NOTE — PROGRESS NOTES
Patient seen and examined with Dr. Rajan Garcia. The right foot wound appears to have pus at the great toe amputation site. Will order a nuc med bone scan given pacemaker and will plan on right foot wound debridement tomorrow. We will need his INR less than 2.0 to proceed with the surgery and NPO after midnight. The procedure was discussed in depth with Mr. Samina Pathak by Dr. Rajan Garcia who agrees to the surgical procedure.

## 2020-05-26 NOTE — PROGRESS NOTES
Discussed with Dr. Claudia Haywood and will cancel bone scan and plan on right foot debridement tomorrow. Will need INR < 2.0 to proceed with surgery.

## 2020-05-26 NOTE — PROGRESS NOTES
Infectious Disease Progress Note Today's Date: 5/26/2020 Admit Date: 5/24/2020 Impression: · MRSA bacteremia: source would appear to be R foot. Local changes suggest soft tissue infection and removal HD access seems too early; not related to deteriorating symptoms. Odor from foot is necrotic. 5/25 blood cx so far negative but early. · He has agreed to surgical intervention today after discussions with Palliative Care. · PVD with recent amputations gangrenous toes on both right and left foots. wound cx with GPC and GNR; growth mod GNR so far. · He reports PCN allergy as feeling strange after IM PCN injection when he was in [facility] and Keflex and rash. Overall doubt these are major allergies. · History of AVR and pacemaker Plan:  
· Continue current ABX · Agree with surgical intervention; very doubtful can resolve current situation with ABX alone. · Follow repeat blood cxs · Needs TTE given recent HD Line, but overall suspect the issue is the foot. Given devices and if he desires continue aggressive care and TTE negative would want to consider TERRI but given current respiratory issues do not think he would tolerate that/be candidate. · Follow sensitivities on isolates to confirm Clinda Sensitive: if not can stop. · Unclear that x-ray imaging in setting recent surgery would be sufficient to make Dx osteo · Duration to be determined by events, repeat cx, TTE and surgical events/findings. Anti-infectives:  
Vancomycin 5/24- Clindamycin 5/24- Levaquin 5/24- Subjective:  
 
Talking with Palliative Care team when I entered. He has decided to agree to surgery and would favor this; odor from foot is actually more today although no fever or worsening redness/swelling. No WBC. Remains hypotensive. Not on pressors Review of Systems:  A comprehensive review of systems was negative except for that written in the History of Present Illness. Objective:  
 
Visit Vitals /58 Pulse 86 Temp 98 °F (36.7 °C) Resp 29 Ht 5' 10\" (1.778 m) Wt 137.6 kg (303 lb 5.7 oz) SpO2 93% BMI 43.53 kg/m² Temp (24hrs), Av.5 °F (36.9 °C), Min:97.5 °F (36.4 °C), Max:100 °F (37.8 °C) Lines:  kevin Physical Exam: Exam:   
 General: NC/AT, alert and cooperative, looks stated age, in NAD HEENT: PERRL, non-icteric sclera, no splinter hemorrhages Neck: supple, symmetric, no masses or lymphadenopathy Cardiac:Nl S1/S2, no murmurs/rubs/gallops/clicks, 1+ LE edema on R;  
 Pulmonary:clear bilaterally no rales/wheezes, good air movement Abdomen: soft, NT, non-distended, BS normal 
 Turner : yes Skin:necrotic odor stronger off R foot today; tiny bit less red and swollen today; still clear drainage from 1st toe amputation site on R 
 MSK:no enlarged or swollen joints in limbs or sternoclavicular area Extremities: no cords/clots/cyanosis, pulses palpable and symmetric Psychiatric: mood and affect appropriate to situation Physical exam is performed in entirety daily by myself and reviewed when copied from colleague. Changes are made as reflected on daily exam. If no changes are made this reflects that exam is performed and unchanged. Data Review: CBC:  
Recent Labs  
  20 
0108 20 
1604 WBC 26.2* 15.6*  
RBC 3.15* 2.97* HGB 8.5* 8.1* HCT 29.1* 26.0*  
 257 GRANS 92* 93* LYMPH 3* 2* EOS 0* 0*  
 
CMP:  
Recent Labs  
  20 
0108 20 
1604 * 200*  136  
K 4.8 4.0  
 100 CO2 25 28 BUN 32* 30* CREA 2.16* 2.08* CA 8.0* 8.0* AGAP 8 8  139* TP 7.1 7.0 ALB 1.8* 1.9*  
GLOB 5.3* 5.1* AGRAT 0.3* 0.4* Liver Enzymes:  
Recent Labs  
  20 TP 7.1 ALB 1.8*  SGOT 24 Microbiology:  
 
All Micro Results Procedure Component Value Units Date/Time CULTURE, Xochitl Irene STAIN [[id number]]  (Abnormal) Collected:  20 Order Status:  Completed Specimen:  Wound from Foot Updated:  05/26/20 5516 Special Requests: NO SPECIAL REQUESTS     
  GRAM STAIN NO WBCS SEEN     
   NO EPITHELIAL CELLS SEEN     
      
  MODERATE GRAM POSITIVE COCCI MODERATE GRAM NEGATIVE RODS Culture result: MODERATE GRAM NEGATIVE RODS SUBCULTURE IN PROGRESS  
     
      
  CULTURE IN PROGRESS,FURTHER UPDATES TO FOLLOW CULTURE, BLOOD [[id number]]  (Abnormal) Collected:  05/24/20 1604 Order Status:  Completed Specimen:  Blood Updated:  05/26/20 0750 Special Requests: RIGHT ANTECUBITAL     
  GRAM STAIN    
  GRAM POS COCCI IN CLUSTERS  
     
      
  AEROBIC AND ANAEROBIC BOTTLES  
     
      
  CRITICAL RESULT NOT CALLED DUE TO PREVIOUS NOTIFICATION OF CRITICAL RESULT WITHIN THE LAST 24 HOURS. Culture result: STAPHYLOCOCCUS AUREUS     
      
  CULTURE IN PROGRESS,FURTHER UPDATES TO FOLLOW CULTURE, BLOOD [[id number]]  (Abnormal) Collected:  05/24/20 1604 Order Status:  Completed Specimen:  Blood Updated:  05/26/20 4991 Special Requests: LEFT ANTECUBITAL     
  GRAM STAIN GRAM POSITIVE COCCI AEROBIC AND ANAEROBIC BOTTLES RESULTS VERIFIED, PHONED TO AND READ BACK BY Sheryle Co @ [facility] 5/25/20 MM Culture result: STAPHYLOCOCCUS AUREUS SENSITIVITY TO FOLLOW REFER TO BIOFIRE  PANEL ACC [id number] CULTURE, BLOOD [[id number]] Collected:  05/25/20 1543 Order Status:  Completed Specimen:  Blood Updated:  05/25/20 1618 CULTURE, BLOOD [[id number]] Collected:  05/25/20 1543 Order Status:  Completed Specimen:  Blood Updated:  05/25/20 1549 CULTURE, URINE [[id number]] Collected:  05/24/20 1900 Order Status:  Completed Specimen:  Cath Urine Updated:  05/25/20 0753 Special Requests: NO SPECIAL REQUESTS Culture result:    
  NO GROWTH AFTER SHORT PERIOD OF INCUBATION.  FURTHER RESULTS TO FOLLOW AFTER OVERNIGHT INCUBATION. BLOOD CULTURE ID PANEL [[id number]]  (Abnormal) Collected:  05/24/20 1604 Order Status:  Completed Specimen:  Blood Updated:  05/25/20 9166 Acc. no. from locr [id number] Staphylococcus Detected Staphylococcus aureus Detected Comment: RESULTS VERIFIED, PHONED TO AND READ BACK BY 
Evelin Benitez RN @ 2250 ON 5/25/2020 AK. mecA (Methicillin-Resistance Genes) Detected Comment: Presence of mecA is highly indicative of methicillin resistance. The test does not replace traditional culture and susceptibilities RESULTS VERIFIED, PHONED TO AND READ BACK BY 
Evelin Benitez RN @ 2446 ON 05/25/2020 AK. INTERPRETATION Gram positive cocci in clusters, identified by realtime PCR as SUSPECTED MRSA. Comment: Recommend IV vancomycin use, unlikely to be a contaminant. Infectious Diseases Consult recommended in adult patients. THIS TEST DOES NOT REPLACE SENSITIVITY TESTING. Imaging: No new Signed By: Gabriella Hernandez MD   
 May 26, 2020

## 2020-05-26 NOTE — PROGRESS NOTES
Bedside and Verbal shift change report given to Priyanka Casillas RN (oncoming nurse) by OLYA Montoya (offgoing nurse). Report included the following information SBAR, Kardex, Procedure Summary, Intake/Output, MAR, Recent Results and Cardiac Rhythm NSR.

## 2020-05-26 NOTE — PROGRESS NOTES
Pt seen in ICU s/p admission severe sepsis and need for possible vascular surgery/amputation. Alert and oriented currently. Confirms demographics. Lives with wife at home, but she has chronic health issues as well. Current with [facility], thinks RN and PT. Independent prior to admission of ADLS's. Discussed possible needs for d/c of resumption of HH, STR, or LTACH. Pt states he has been to [facility] before and open to go, if needs. States son, Bhaskar Klein, is decision maker for him, but not on file currently. Has another son, Herbert Martin. CM will continue to follow for any assist and d/c POC. Palliative care following as pt initially did not want to be intubated, even for surgery. Care Management Interventions PCP Verified by CM: Yes Mode of Transport at Discharge: Other (see comment) Transition of Care Consult (CM Consult): Discharge Planning, Home Health([facility]) [address]: No 
Reason Outside [place]: Patient already serviced by other home care/hospice agency Discharge Durable Medical Equipment: (walker, w/c, cane, O2 -[employer], nebulizer) Current Support Network: Lives with [facility], [phone], has another son, Herbert Martin) Confirm Follow Up Transport: Family Discharge Location Discharge Placement: Unable to determine at this time

## 2020-05-26 NOTE — PROGRESS NOTES
05/26/20 1505 Dual Skin Pressure Injury Assessment Dual Skin Pressure Injury Assessment X Second Care Provider (Based on [address] [place] [address]) Emi Henley RN Dual skin assessment completed. Pt has blanchable redness to sacrum/coccyx area with Allevyn placed and to both feet at surgical sites and sutures are in place at these surgical sites. . Pt has 1-4 toes amputated to the right foot and 2 and 3 amputated on the left foot and necrotic left great toe. Pt has a surgical scar to chest due to CABG. Pt also has scar to right upper chest. Pt has trace edema to BUE and +2 pitting edema to BLE. Pt also has some bruising to right forearm.

## 2020-05-26 NOTE — PROGRESS NOTES
Hourly rounding completed on this shift. pt did not c/o pain this shift. Pts feet were dressed with gauze and kerlix and tape. All needs met. Pt is currently resting in bed. Will continue to monitor and give report to oncoming nurse.

## 2020-05-26 NOTE — PROGRESS NOTES
CRITICAL CARE PROGRESS NOTE Natasha Rutledge 5/26/2020 Date of Admission:  5/24/2020 The patient's chart is reviewed and the patient is discussed with the staff. 
 
 69 y.o. CM evaluated at the request of Dr. Gretel Jernigan for acute respiratory failure, severe sepsis, hypotension, BIPAP and pressor support. Had near syncopal episode on the toilet and EMS was called. He reported ESTRELLA, chills and EMS reported documented fever. BNP was 4134 with recent prior being 2600 on 5/15/20. CXR consistent with acute pulmonary edema/CHF. Has preserved LVEF on recent ECHO, hx of pA.fib. Creat 2.0, LA was 3, glucose 200, hemoglobin 8, WBC 15, procal 8. ABG:  pH of 7.37 PCO2 47, PaO2 67 on 3 L, sat 92%. He declined in ER, was unable to speak complete sentences, O2 sat dropped and BP was borderline. Turner placed and given IV Lasix. Was admitted to ICU on BIPAP, DNR confirmed with severe sepsis, DEJUAN , presumed CHF and concern for osteomyelitis source with bilateral malodorousl feet. Continued on Vancomycin, consulted intensivist, ID, cardiology, vascular, wound care. Was recently discharged by Dr. Savannah Nunez, vascular surgery service May 15 following right fourth left second and third toe amputations secondary to gangrene. Was discharged home on Midrin and Florinef. Chronic medical: Morbidly obesity, chronic 2-3 L home O2, CAD s/p CABG with AVR 1/10/20 by Dr. Mar Ross complicated by CKD requiring HD, HIT +, DVTs, wound left thigh and pneumonia, PVD, DM with diabetic nephropathy. Chronic anticoagulation with warfarin for paroxysmal A. fib and recently bioprosthetic AVR. Has a permanent pacemaker and bilateral carotid stenosis. Was seen by cardiology [address] days ago, claims meds were changed but he cannot tell me which ones. He cannot tell me the dose of his warfarin. I do have a discharge summary and reviewed MAR. INR elevated. Subjective: Resting in bed, weaned to [place] and denies shortness of breath. Has productive cough at times but feels he is able to clear secretions. Conversant and talkative. Weaned off pressor support. Review of Systems Constitutional: negative for fever, chills, sweats Cardiovascular: LE edema, negative for chest pain, palpitations, syncope Gastrointestinal: negative for dysphagia, reflux, vomiting, diarrhea, abdominal pain, or melena Neurologic: negative for focal weakness, numbness, headache Home O2 2-3 L per NC, chronic. Current Facility-Administered Medications Medication Dose Route Frequency  insulin glargine (LANTUS) injection 25 Units  25 Units SubCUTAneous QHS  NUTRITIONAL SUPPORT ELECTROLYTE PRN ORDERS   Does Not Apply PRN  
 meropenem (MERREM) 500 mg in 0.9% sodium chloride (MBP/ADV) 50 mL  0.5 g IntraVENous Q8H  
 famotidine (PEPCID) tablet 20 mg  20 mg Oral DAILY  sodium chloride (NS) flush 5-40 mL  5-40 mL IntraVENous Q8H  
 sodium chloride (NS) flush 5-40 mL  5-40 mL IntraVENous PRN  
 acetaminophen (TYLENOL) tablet 650 mg  650 mg Oral Q4H PRN  
 albuterol (PROVENTIL VENTOLIN) nebulizer solution 2.5 mg  2.5 mg Nebulization Q6H RT  
 insulin lispro (HUMALOG) injection   SubCUTAneous AC&HS  
 buPROPion (WELLBUTRIN) tablet 75 mg  75 mg Oral BID  ALPRAZolam (XANAX) tablet 0.5 mg  0.5 mg Oral QHS PRN  
 midodrine (PROAMATINE) tablet 10 mg  10 mg Oral TID  OLANZapine (ZyPREXA) tablet 5 mg  5 mg Oral QHS  mirtazapine (REMERON) tablet 15 mg  15 mg Oral QHS  sertraline (ZOLOFT) tablet 100 mg  100 mg Oral DAILY  tamsulosin (FLOMAX) capsule 0.4 mg  0.4 mg Oral DAILY  vancomycin (VANCOCIN) 1750 mg in  ml infusion  1,750 mg IntraVENous Q24H  clindamycin (CLEOCIN) 600mg D5W 50mL IVPB (premix)  600 mg IntraVENous Q8H  
 furosemide (LASIX) injection 40 mg  40 mg IntraVENous BID  NOREPINephrine (LEVOPHED) 4 mg in 5% dextrose 250 mL infusion  0.5-16 mcg/min IntraVENous TITRATE Objective:  
 
Vitals:  
 05/26/20 0730 05/26/20 0745 05/26/20 0800 05/26/20 0815 BP: 95/53 101/55 96/55 98/55 Pulse: 85 86 83 84 Resp: 27 26 28 15 Temp: 98 °F (36.7 °C) SpO2: 96% 94% 92% 93% Weight:      
Height: PHYSICAL EXAM  
 
Constitutional:  the patient is obese and in no acute distress, NC 6L sat 93% EENMT:  Sclera clear, pupils equal, oral mucosa moist 
Respiratory: few scattered anterior crackles, no wheezing, productive cough at times Cardiovascular:  RRR without M,G,R 
Gastrointestinal: soft and non-tender; with positive bowel sounds. Musculoskeletal: warm without cyanosis. There is bilateral 1-2+ lower extremity edema. Skin:  no jaundice or rashes, right and left feet wounds Neurologic: no gross neuro deficits Psychiatric:  alert and oriented x 3 Lines: PIV sites Turner 5/24/20 Drips:   None CXR:   
5/24/20:  Acute CHF exacerbation Recent Labs  
  05/26/20 
0737 05/26/20 
0318 05/25/20 
1051 05/25/20 
0108 05/24/20 
1604 WBC  --   --   --  26.2* 15.6* HGB  --   --   --  8.5* 8.1* HCT  --   --   --  29.1* 26.0*  
PLT  --   --   --  234 257 INR 7.7* >9.0* 7.4*  --  4.8* Recent Labs  
  05/25/20 
0704 05/25/20 
0108 05/24/20 
1915 05/24/20 
1604 NA  --  138  --  136 K  --  4.8  --  4.0  
CL  --  105  --  100 GLU  --  232*  --  200* CO2  --  25  --  28 BUN  --  32*  --  30* CREA  --  2.16*  --  2.08* MG  --  1.7*  --   --   
CA  --  8.0*  --  8.0*  
TROIQ <0.02* <0.02* <0.02*  --   
ALB  --  1.8*  --  1.9* TBILI  --  0.5  --  0.5 ALT  --  18  --  20 SGOT  --  24  --  24 Recent Labs  
  05/24/20 2036 05/24/20 
1712 PHI 7.326* 7.372 PCO2I 50.0* 47.2*  
PO2I 67* 67* HCO3I 26.1* 27.4* Recent Labs  
  05/25/20 
0108 05/24/20 
1604 LAC 1.6 3.0* Assessment:  (Medical Decision Making) Hospital Problems  Date Reviewed: 5/26/2020 Codes Class Noted POA Chronic respiratory failure with hypoxia (HCC) (Chronic) ICD-10-CM: J96.11 
ICD-9-CM: 518.83, 799.02  5/25/2020 Yes Overview Signed 5/25/2020 10:49 AM by Ky Barnes NP Home O2 2-3L per NC. Supposed to be on CPAP/? BIPAP at home but stopped using due to wife's illness Lactic acidosis ICD-10-CM: E87.2 ICD-9-CM: 276.2  5/24/2020 Yes Resolved Osteomyelitis ([facility] ICD-10-CM: M86.9 ICD-9-CM: 730.20  5/24/2020 Unknown Likely source of bacteremia Acute CHF (congestive heart failure) ([facility]) ICD-10-CM: I50.9 ICD-9-CM: 428.0  5/24/2020 Yes Had CABG/AVR in 1/2020 * (Principal) Severe sepsis ([facility] ICD-10-CM: A41.9, R65.20 ICD-9-CM: 038.9, 995.92  5/24/2020 Yes MRSA in blood DEJUAN (acute kidney injury) ([facility] ICD-10-CM: N17.9 ICD-9-CM: 584.9  5/24/2020 Yes Creatinine stable Gangrenous toe ([facility] ICD-10-CM: I11 
ICD-9-CM: 785.4  5/14/2020 Yes Per vascular surgery PAD (peripheral artery disease) ([facility]) (Chronic) ICD-10-CM: I73.9 ICD-9-CM: 443.9  5/14/2020 Yes  
 chronic Hypoxia ICD-10-CM: R09.02 
ICD-9-CM: 799.02  5/14/2020 Yes Weaned to NC 6L Gangrene of toe of both feet ([facility] ICD-10-CM: L79 
ICD-9-CM: 785.4  5/11/2020 Yes Per vascular surgery Long term (current) use of anticoagulants ICD-10-CM: Z79.01 
ICD-9-CM: V58.61  4/27/2020 Yes On Warfarin prior to admission--repeat INR 7.7 today Pacemaker (Chronic) ICD-10-CM: Z95.0 ICD-9-CM: V45.01  4/27/2020 Yes Overview Signed 5/6/2020  9:46 PM by Marina Brown MD  
  Dual chamber Biotronik pacemaker placed (1/24/20): AV block post op AVR. chronic DNR (do not resuscitate) (Chronic) ICD-10-CM: T49 ICD-9-CM: V49.86  2/26/2020 Yes  
 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 
ICD-9-CM: 427.31  1/13/2020 Yes Overview Signed 5/6/2020  9:46 PM by Marlee Ray MD  
  Post op CABG. sinus CAD (coronary artery disease) (Chronic) ICD-10-CM: I25.10 ICD-9-CM: 414.00  1/10/2020 Yes Overview Signed 5/6/2020  1:19 PM by Marlee Ray MD  
  CABG (1/16/20):  LIMA to LAD. SVG to OM. SVG to PDA. chronic S/P CABG x 3 (Chronic) ICD-10-CM: Z95.1 ICD-9-CM: V45.81  1/10/2020 Yes  
 chronic Status post aortic valve replacement ICD-10-CM: Z95.2 ICD-9-CM: V43.3  1/10/2020 Yes Overview Addendum 5/6/2020  9:43 PM by Marlee Ray MD  
  1. AVR (1/16/20):  25 mm Intuity Jon Gutierres Valve. 2.  Echo (4/12/20):  EF 55-60%. Normal functioning AVR. MG 15. PG 26. Mild MR.  
  
  
 chronic Type 2 diabetes with nephropathy (HCC) (Chronic) ICD-10-CM: E11.21 
ICD-9-CM: 250.40, 583.81  8/26/2019 Yes Chronic--ranges 183-270 Obesity, morbid ([address] (Chronic) ICD-10-CM: E66.01 
ICD-9-CM: 278.01  10/19/2018 Yes  
 chronic Gout (Chronic) ICD-10-CM: M10.9 ICD-9-CM: 274.9  1/14/2013 Yes  
 chronic  
 BPH (benign prostatic hyperplasia) (Chronic) ICD-10-CM: N40.0 ICD-9-CM: 600.00  1/14/2013 Yes  
 chronic HLD (hyperlipidemia) (Chronic) ICD-10-CM: H79.8 ICD-9-CM: 272.4  1/14/2013 Yes  
 chronic Plan:  (Medical Decision Making) --Albuterol --recent WBC up to 26.2 (was 15.6 on admission) --Repeat INR elevated 7.7 today  
--Clindamycin,Levaquin and Vancomycin day 2--ID consulted --Blood cultures 5/24:  2/2 MRSA; repeat:  pending 
--Urine culture:  Pending 
--Wound culture:  Pending --Lasix 40 mg IV BID, creat 2.16, urine output 1.0 L--required dialysis last admission 
--Right foot X-ray: Soft tissue swelling and irregularity along the amputation plane of the right first through fourth toes.  Osteomyelitis is a consideration. Consider further evaluation with MRI to better evaluate for osteomyelitis. --ID following--Clindamycin, Merrem, Vancomycin  
--Cardiology following--ECHO pending today --Vascular surgery following-discussion regarding foot amputation. --Palliative care follwing--DNR status. Has refused intubation even for surgery. He is concerned that son might reverse his DNR status but this is contrary to his wishes and would not be followed. --Weaned off BIPAP to NC 6L. Chronic home O2 2-3L continuously. --OK to move to the floor from pulmonary standpoint. More than 50% of the time documented was spent in face-to-face contact with the patient and in the care of the patient on the floor/unit where the patient is located. Jose Tracy NP Lungs:   clear Heart:  RRR with no Murmur/Rubs/Gallops Additional Comments:  reconsult vasculat surgery- pt is willing to have what ever amputation is necessary I have spoken with and examined the patient. I agree with the above assessment and plan as documented.  
 
Jennifer Joyner MD

## 2020-05-26 NOTE — CONSULTS
Palliative Care Patient: Rudy Christianson MRN: [id number]  SSN: [id number] YOB: 1945  Age: 69 y.o. Sex: male Date of Request: 5/25/20 Date of Consult:  5/26/2020 Reason for Consult:  goals of care Requesting Physician: Dr Milon Schaumann 
 
 Assessment/Plan:  
 
Principal Diagnosis:   
Debility, Unspecified  R53.81 Additional Diagnoses: · Advance Care Planning Counseling R86.02 · Depression  F32.9 · Dyspnea  R06.00 
· Pain, limb  M79.609 · Counseling, Encounter for Medical Advice  Z71.9 
· Encounter for Palliative Care  Z51.5 Palliative Performance Scale (PPS): PPS: 40 Medical Decision Making:  
Reviewed and summarized notes from admission to present. Discussed case with appropriate providers: ICU IDT rounds, OLYA Daily, Dr Debra Sy, NP César Ortega, Dr Thalia Bishop, Dr Kristel Butler Reviewed laboratory and x-ray data from admission to present. Pt resting in bed. He endorses some discomfort in his R foot. Mr Eulalia Quiñonez endorses depression; he is on medication for it. Mr Eulalia Quiñonez is of two minds on how to proceed: he says that he wants to die, but also that he is willing to have some further surgery on his feet. He says that he is not happy with his current quality of life, but agrees that if he were not in pain he would be willing to live life from a wheelchair. He says that if he dies, he would not be a burden on his wife and family, but does agree that they might miss him. He said that he wants to die and \"couldn't the doctors just let me die in my sleep? \"  We discussed that [place] is not a right to die state, but that he could bring about his death by refusing surgery and antibiotics and going home with hospice. Mr Eulalia Quiñonez concluded that he is interested in surgery, is willing to be intubated for surgery, and is interested in a prosthesis if this is possible. OLYA Daily has contacted [facility], who will see him later today.    I updated Dr Max Avina and his son [address], then Juliette Whitney arranged for [address] and the pt to speak. Will discuss findings with members of the interdisciplinary team.   
 
Thank you for this referral.    
 
  
. 
 
In addition to the E&M described above, more than 50% of a 35-minute prolonged visit, from [phone], was spent on counseling and coordination of care. Subjective:  
 
History obtained from:  Patient, Family, Care Provider and Chart Chief Complaint: Concern about infection in foot. History of Present Illness:  Mr Karol Avila is a 68 yo M with a PMH of type 2 diabetes, PVD, CAD, pacemaker, and other conditions listed below who was hospitalized in mid May for aortic valve replacement during which time he developed gangrene in several toes which were then amputated. He had a near-syncopal episode at home on 5/24/20 and was brought to the ER by EMS. He was admitted to the ICU for BIPAP use. His feet were malodorous. Advance Directive: No      
Code Status:  DNR Health Care Power of : No - Patient does not have a [address]. His son Addison Gomez is his health care surrogate. Past Medical History:  
Diagnosis Date  Arthritis  BPH (benign prostatic hyperplasia) 1/14/2013  CAD (coronary artery disease) CABG- 1-2020  DM type 2 (diabetes mellitus, type 2) ([address] dx 2004 Type 2, avg fbs 250 recognizes hypo at 67,  Dyspnea   
 at times, Uses Albuterol inhaler when needed (last used first of aug 2014)  Gout 1/14/2013  HLD (hyperlipidemia) 1/14/2013  
 HTN (hypertension)   
 controlled with meds  Morbid obesity ([address] 9/3/14 BMI 41.7  Oxygen dependent 2.5 liters mainly at night, as needed  Psychiatric disorder   
 anxiety, controlled with buspar  Rheumatic fever   
 as a child  Seasonal allergic rhinitis   
 treated with Allegra  Severe aortic valve stenosis   
 echo 09/11/19 EF 60-65%- mild to moderate regurgitation  Thyroid disease   
 hx- no longer takes synthroid  Unspecified sleep apnea 2016  
 no bipap at this time Past Surgical History:  
Procedure Laterality Date  HX CATARACT REMOVAL Bilateral 2012  HX HEART CATHETERIZATION  12/23/2019  HX ORTHOPAEDIC Left   
 carpal tunnel  HX TONSIL AND ADENOIDECTOMY  IR INSERT TUNL CVC W PORT OVER 5 YEARS  2/4/2020  
 removed  VASCULAR SURGERY PROCEDURE UNLIST    
 cabg x 3 with avr Family History Problem Relation Age of Onset  Lung Disease Mother   
     copd  Cancer Father   
     lympnode cancer  No Known Problems Brother  Cancer Other   
     fmh lymphoma  Diabetes Other   
     fmhx  Diabetes Maternal Grandfather Social History Tobacco Use  Smoking status: Never Smoker  Smokeless tobacco: Never Used Substance Use Topics  Alcohol use: Not Currently Alcohol/week: 0.0 standard drinks Prior to Admission medications Medication Sig Start Date End Date Taking? Authorizing Provider Jantoven 7.5 mg tablet Take 1 Tab by mouth daily. Pernell Kobs and Friday or as directed 5/22/20   Forest De Oliveira MD  
Jantoven 5 mg tablet Take 1 Tab by mouth daily. Tues, Thurs and Sunday or as directed 5/22/20   Forest De Oliveira MD  
furosemide (LASIX) 40 mg tablet Take 1 Tab by mouth two (2) times a day. Take 1 tablet twice a day for 3 days then resume 1 tablet per day 5/15/20   Mt Juan NP  
ALPRAZolam [facility]) 0.5 mg tablet Take 1 Tab by mouth nightly as needed for Anxiety. Max Daily Amount: 0.5 mg. 5/13/20   Emmanuel Masters MD  
sertraline (ZOLOFT) 100 mg tablet Take 1 Tab by mouth daily. 5/13/20   Emmanuel Masters MD  
potassium chloride (KLOR-CON) 10 mEq tablet Take 1 Tab by mouth daily. 5/13/20   Emmanuel Masters MD  
tamsulosin (Flomax) 0.4 mg capsule Take 1 Cap by mouth daily. 4/29/20   Emmanuel Masters MD  
OLANZapine (ZyPREXA) 5 mg tablet Take 1 Tab by mouth nightly.  4/29/20 Nayan Leyva MD  
mirtazapine (Remeron) 15 mg tablet Take 1 Tab by mouth nightly. 4/29/20   Nayan Leyva MD  
insulin glargine (Lantus Solostar U-100 Insulin) 100 unit/mL (3 mL) inpn 25 units sq qhs and 5 units sq qam 4/29/20   Nayan Leyva MD  
buPROPion [facility]) 75 mg tablet Take 1 Tab by mouth two (2) times a day. 4/29/20   Nayan Leyva MD  
insulin aspart U-100 (NovoLOG) 100 unit/mL crtg Check sugar qac. Less than 200 no units, 201-250 2 units sq, 251-300 4 units sq, 301-350 6 units sq, 351-400 8 units sq, over 401 10 units sq. 4/29/20   Marina Han MD  
fludrocortisone (FLORINEF) 0.1 mg tablet 0.1 mg daily. 2 tabs po daily 4/26/20   Provider, Historical  
midodrine (PROAMATINE) 10 mg tablet  4/26/20   Provider, Historical  
 
 
Allergies Allergen Reactions  Heparin Other (comments) HIT antibody test strongly positive on 1/17/2020. SHARON drawn 1/18/2020 and resulted positive on 1/21/2020  Amoxicillin Rash  Keflex [Cephalexin] Rash Review of Systems: A comprehensive review of systems was negative except for: Respiratory: Positive for dyspnea, on O2 via NC. Musculoskeletal: Discomfort in R foot Skin: Positive for recent surgery on R foot. Behavioral/Psychiatric: Positive for depression. Objective:  
 
Visit Vitals /59 Pulse 89 Temp 98 °F (36.7 °C) Resp 24 Ht 5' 10\" (1.778 m) Wt 303 lb 5.7 oz (137.6 kg) SpO2 92% BMI 43.53 kg/m² Physical Exam: 
 
General:  Cooperative. No acute distress. Eyes:  Conjunctivae/corneas clear Nose: Nares normal. Septum midline. Neck: Supple, symmetrical, trachea midline, no JVD Lungs:   Clear to auscultation bilaterally, unlabored Heart:  Regular rate and rhythm, no murmur Abdomen:   Soft, non-tender, non-distended Extremities: Normal, atraumatic, no cyanosis or edema Skin: Skin color, texture, turgor normal. No rash or lesions. Neurologic: Nonfocal  
Psych: Alert and oriented Assessment:  
 
Hospital Problems  Date Reviewed: 5/26/2020 Codes Class Noted POA Chronic respiratory failure with hypoxia (HCC) (Chronic) ICD-10-CM: J96.11 
ICD-9-CM: 518.83, 799.02  5/25/2020 Yes Overview Signed 5/25/2020 10:49 AM by Aman Cook NP Home O2 2-3L per NC. Pulmonary edema, noncardiac ICD-10-CM: J81.1 ICD-9-CM: 187  5/25/2020 Unknown Lactic acidosis ICD-10-CM: E87.2 ICD-9-CM: 276.2  5/24/2020 Yes Osteomyelitis ([facility] ICD-10-CM: M86.9 ICD-9-CM: 730.20  5/24/2020 Unknown * (Principal) Severe sepsis ([facility] ICD-10-CM: A41.9, R65.20 ICD-9-CM: 038.9, 995.92  5/24/2020 Yes DEJUAN (acute kidney injury) ([facility] ICD-10-CM: N17.9 ICD-9-CM: 584.9  5/24/2020 Yes Gangrenous toe ([facility] ICD-10-CM: M66 
ICD-9-CM: 785.4  5/14/2020 Yes PAD (peripheral artery disease) (HCC) (Chronic) ICD-10-CM: I73.9 ICD-9-CM: 443.9  5/14/2020 Yes Hypoxia ICD-10-CM: R09.02 
ICD-9-CM: 799.02  5/14/2020 Yes Gangrene of toe of both feet ([facility] ICD-10-CM: I47 
ICD-9-CM: 785.4  5/11/2020 Yes Long term (current) use of anticoagulants ICD-10-CM: Z79.01 
ICD-9-CM: V58.61  4/27/2020 Yes Pacemaker (Chronic) ICD-10-CM: Z95.0 ICD-9-CM: V45.01  4/27/2020 Yes Overview Signed 5/6/2020  9:46 PM by Marlee Ray MD  
  Dual chamber Biotronik pacemaker placed (1/24/20): AV block post op AVR. 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 
ICD-9-CM: 427.31  1/13/2020 Yes Overview Signed 5/6/2020  9:46 PM by Marlee Ray MD  
  Post op CABG. CAD (coronary artery disease) (Chronic) ICD-10-CM: I25.10 ICD-9-CM: 414.00  1/10/2020 Yes Overview Signed 5/6/2020  1:19 PM by Anushka Mallory MD  
  CABG (1/16/20):  LIMA to LAD. SVG to OM. SVG to PDA. S/P CABG x 3 (Chronic) ICD-10-CM: Z95.1 ICD-9-CM: V45.81  1/10/2020 Yes Status post aortic valve replacement ICD-10-CM: Z95.2 ICD-9-CM: V43.3  1/10/2020 Yes Overview Addendum 5/6/2020  9:43 PM by Anushka Mallory MD  
  1. AVR (1/16/20):  25 mm Intuity Jon Gutierres Valve. 2.  Echo (4/12/20):  EF 55-60%. Normal functioning AVR. MG 15. PG 26. Mild MR. 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 
ICD-9-CM: 250.40, 583.81  8/26/2019 Yes Obesity, morbid ([address] (Chronic) ICD-10-CM: E66.01 
ICD-9-CM: 278.01  10/19/2018 Yes Gout (Chronic) ICD-10-CM: M10.9 ICD-9-CM: 274.9  1/14/2013 Yes  
   
 BPH (benign prostatic hyperplasia) (Chronic) ICD-10-CM: N40.0 ICD-9-CM: 600.00  1/14/2013 Yes HLD (hyperlipidemia) (Chronic) ICD-10-CM: L48.3 ICD-9-CM: 272.4  1/14/2013 Yes Signed By: Naomie Merida NP May 26, 2020

## 2020-05-26 NOTE — PROGRESS NOTES
Patient would like to discuss surgical intervention. Patient is status post toe amputations  roughly 2 weeks ago by Dr. Pepper Siddiqui. He presented to the emergency department with worsening respiratory issues and pulmonary edema along with sepsis. He is a current DNR but now he would like to rescind his DNR status and undergo surgical intervention. I did have a lengthy discussion with him that if he did rescind his DNR status that during surgical intervention he would be a full code, which means if his heart were to stop we would do everything possible to restart his heart which may include placing him on a ventilator. He is okay with resending his DNR status at this time and undergoing surgical intervention. I did discuss this with Dr. Pepper Siddiqui who will round on Mr. Jonas Ventura and discuss this with him more in depth.

## 2020-05-27 PROBLEM — A41.9 SEVERE SEPSIS WITH SEPTIC SHOCK (HCC): Status: RESOLVED | Noted: 2020-01-01 | Resolved: 2020-01-01

## 2020-05-27 PROBLEM — R09.02 HYPOXIA: Status: RESOLVED | Noted: 2020-01-01 | Resolved: 2020-01-01

## 2020-05-27 PROBLEM — Z79.01 LONG TERM (CURRENT) USE OF ANTICOAGULANTS: Chronic | Status: ACTIVE | Noted: 2020-01-01

## 2020-05-27 PROBLEM — I96 GANGRENOUS TOE (HCC): Status: RESOLVED | Noted: 2020-01-01 | Resolved: 2020-01-01

## 2020-05-27 PROBLEM — E87.20 LACTIC ACIDOSIS: Status: RESOLVED | Noted: 2020-01-01 | Resolved: 2020-01-01

## 2020-05-27 PROBLEM — R65.21 SEVERE SEPSIS WITH SEPTIC SHOCK (HCC): Status: RESOLVED | Noted: 2020-01-01 | Resolved: 2020-01-01

## 2020-05-27 PROBLEM — J96.21 ACUTE ON CHRONIC RESPIRATORY FAILURE WITH HYPOXIA (HCC): Status: RESOLVED | Noted: 2020-01-01 | Resolved: 2020-01-01

## 2020-05-27 NOTE — PROGRESS NOTES
Pharmacokinetic Consult to Pharmacist 
 
Rayo Naidu is a 69 y.o. male being treated for SSTI-  with Vanc. Height: 5' 10\" (177.8 cm)  Weight: 140.7 kg (310 lb 3.2 oz) Lab Results Component Value Date/Time BUN 41 (H) 05/27/2020 05:59 AM  
 Creatinine 2.00 (H) 05/27/2020 05:59 AM  
 WBC 16.0 (H) 05/27/2020 05:59 AM  
 Procalcitonin 8.17 05/24/2020 04:04 PM  
 Lactic acid 1.6 05/25/2020 01:08 AM  
  
Estimated Creatinine Clearance: 46.6 mL/min (A) (based on SCr of 2 mg/dL (H)). CULTURES: 
5/25 - Blood - 1/4 vials GPC 
5/25 - Wound - GNR and GPC 
5/24 - Urine - NGTD 
5/24 - Blood x 2 - 4/4 vials MRSA Lab Results Component Value Date/Time Vancomycin,trough 23.9 ([facility]) 05/27/2020 08:21 AM  
 Vancomycin, random 12.5 04/10/2020 05:46 AM  
 
 
Day 4 of vancomycin. Goal trough is 15-20mcg/ml. Vancomycin trough supratherapeutic at 23.9, drawn appropriately. Will adjust to 1250 mg every 24 hours for now. Will continue to follow patient. Thank you, Parker Bush, PharmD, [facility] Clinical [facility] Specialist 
[phone]

## 2020-05-27 NOTE — BRIEF OP NOTE
Brief Postoperative Note Patient: Rayo Naidu YOB: 1945 MRN: [id number] Date of Procedure: 5/27/2020 Pre-Op Diagnosis: [facility], [facility] [I73.9] Post-Op Diagnosis: Same as preoperative diagnosis. Procedure(s): RIGHT FOOT DEBRIDEMENT/ ROOM 608 Surgeon(s): 
Chiara Petersen MD 
 
Surgical Assistant: None Anesthesia: General  
 
Estimated Blood Loss (mL): Minimal 
 
Complications: None Specimens: * No specimens in log * Implants: * No implants in log * Drains: * No LDAs found * Findings: No kit pus or aggressive infection Electronically Signed by Jaime Fuentes MD on 5/27/2020 at 1:08 PM

## 2020-05-27 NOTE — PROGRESS NOTES
100 [facility] OUTREACH NURSE PROGRESS REPORT SUBJECTIVE: Called to assess patient secondary to transfer from ICU. MEWS Score: 2 (05/26/20 1505) Vitals:  
 05/26/20 1505 05/26/20 1926 05/26/20 1938 05/26/20 2124 BP: 113/67  120/69 Pulse: 88  (!) 126 (!) 128 Resp: 22  22 Temp: 98.3 °F (36.8 °C)  99.3 °F (37.4 °C) SpO2: 92% 90% 90% 92% Weight:      
Height:      
  
 
LAB DATA: 
 
Recent Labs  
  05/25/20 
0108 05/24/20 
1604  136  
K 4.8 4.0  
 100 CO2 25 28 AGAP 8 8 * 200* BUN 32* 30* CREA 2.16* 2.08* GFRAA 39* 40* GFRNA 32* 33* CA 8.0* 8.0*  
MG 1.7*  --   
ALB 1.8* 1.9* TP 7.1 7.0  
GLOB 5.3* 5.1* AGRAT 0.3* 0.4* ALT 18 20 Recent Labs  
  05/26/20 
1525 05/25/20 
0108 05/24/20 
1604 WBC 21.2* 26.2* 15.6* HGB 7.0* 8.5* 8.1* HCT 23.3* 29.1* 26.0*  
 234 257 OBJECTIVE: On arrival to room, I found patient to be in bed. Visit Vitals /69 (BP 1 Location: Left arm, BP Patient Position: Head of bed elevated (Comment degrees)) Pulse (!) 128 Temp 99.3 °F (37.4 °C) Resp 22 Ht 5' 10\" (1.778 m) Wt 137.6 kg (303 lb 5.7 oz) SpO2 92% BMI 43.53 kg/m² General:  Alert, cooperative, no distress. Head:  Normocephalic, without obvious abnormality, atraumatic. Eyes:  Conjunctivae/corneas clear. Pupils equal, round, reactive to light. Extraocular movements intact. Lungs:   Clear to auscultation bilaterally. O2 increased slightly, O2 sat 90% Chest wall:  No tenderness or deformity. Heart:  Tachycardia, S1, S2 normal, no murmur, click, rub, or gallop. Abdomen:   Soft, non-tender. Bowel sounds normal. No masses. No organomegaly. Extremities: Extremities normal, atraumatic, no cyanosis or edema. Pulses: 2+ and symmetric all extremities. Skin: Dressings on bilateral feet c/d/i Lymph nodes: Cervical, supraclavicular, and axillary nodes normal.  
 Neurologic: CNII-XII intact. Normal strength, sensation, and reflexes throughout. Pain Assessment Pain Intensity 1: 0 (05/26/20 1505) Patient Stated Pain Goal: 0 
 
  
  
  
  
 
  
  
  
   
 
ASSESSMENT:  Patient reports anxiety secondary to upcoming debridement of R foot wound. Primary RN notified. PLAN:  Continue to follow per outreach protocol.

## 2020-05-27 NOTE — PROGRESS NOTES
TRANSFER - IN REPORT: 
 
Verbal report received from Rosendo Best RN(name) on Huy Alberts  being received from PACU 
(unit) for routine progression of care Report consisted of patients Situation, Background, Assessment and  
Recommendations(SBAR). Information from the following report(s) SBAR was reviewed with the receiving nurse. Opportunity for questions and clarification was provided. Assessment completed upon patients arrival to unit and care assumed.

## 2020-05-27 NOTE — PROGRESS NOTES
Chart screened by  for discharge planning. Patient to have right foot debridement today. Patient may be appropriate for [facility] at discharge due to complicated wounds. CM will continue to follow patient during hospitalization for discharge planning and needs. Please consult  if any new issues arise.

## 2020-05-27 NOTE — PROGRESS NOTES
Date of Outreach Update: 
Dane Hernandez was seen and assessed. MEWS Score: 1 (05/27/20 1448) Vitals:  
 05/27/20 1345 05/27/20 1349 05/27/20 1447 05/27/20 1448 BP: 106/52 112/59  152/79 Pulse: 85 90  94 Resp: 18 18  19 Temp:  98 °F (36.7 °C)  97.5 °F (36.4 °C) SpO2: 96% 97% 94% 91% Weight:      
Height:      
  
 
 Pain Assessment Pain Intensity 1: 0 (05/27/20 1428) Patient Stated Pain Goal: 0 Previous Outreach assessment has been reviewed. There have been no significant clinical changes since the completion of the last dated Outreach assessment. Patient seen post op- resting in bed, SpO2 97% on 10L NC and HR 82. Last Hgb 8.0. Dressing to R foot c/d/i Will continue to follow up per outreach protocol. Signed By:   Cesar Todd   May 27, 2020 4:57 PM

## 2020-05-27 NOTE — PROGRESS NOTES
100 [facility] OUTREACH NURSE PROGRESS REPORT SUBJECTIVE: Called to assess patient secondary to transfer from critical care on 5/26. MEWS Score: 1 (05/27/20 0703) Vitals:  
 05/27/20 3707 05/27/20 0454 05/27/20 0703 05/27/20 0782 BP: 116/64  120/64 Pulse: 96  92 Resp: 20  20 Temp: 98 °F (36.7 °C)  98.2 °F (36.8 °C) SpO2: 97%  95% 94% Weight:  140.7 kg (310 lb 3.2 oz) Height:      
  
 
LAB DATA: 
 
Recent Labs  
  05/27/20 
0559 05/25/20 
0108 05/24/20 
1604  138 136  
K 4.1 4.8 4.0  
 105 100 CO2 27 25 28 AGAP 8 8 8 * 232* 200* BUN 41* 32* 30* CREA 2.00* 2.16* 2.08* GFRAA 42* 39* 40* GFRNA 35* 32* 33* CA 8.1* 8.0* 8.0*  
MG  --  1.7*  --   
ALB  --  1.8* 1.9* TP  --  7.1 7.0  
GLOB  --  5.3* 5.1* AGRAT  --  0.3* 0.4* ALT  --  18 20 Recent Labs  
  05/27/20 
0559 05/26/20 
1525 05/25/20 
0108 WBC 16.0* 21.2* 26.2* HGB 6.8* 7.0* 8.5* HCT 22.6* 23.3* 29.1*  
 243 234 OBJECTIVE: On arrival to room, I found patient to be resting in bed. Pain Assessment Pain Intensity 1: 0 (05/26/20 1505) Patient Stated Pain Goal: 0 
 
  
  
  
  
 
  
  
  
   
 
ASSESSMENT:  Patient resting in bed, pulled up in bed as requested. SpO2 94% HR 88 on NC 11L. Discussed with primary RN and Dr. Mary Alvarez, patient is scheduled for surgery today but Hgb this am 6.3- 1 unit PRBC ordered by MD. INR 2.4 this am.  
Sutures noted to middles toes of L foot, c/d/i, L big toe black/dry. R foot with sutures, noted to have some swelling and redness- to be debrided today in surgery. PLAN:  Will continue to follow per critical care outreach protocol.

## 2020-05-27 NOTE — PROGRESS NOTES
Coco Brasher from pre-op called for update about patient and morning lab results: Hgb 6.3; INR 2.4. Dr. Alec Mcmillan aware and will speak with Eda Hoffman NP with Vascular about plan for patient today and possible debridement.

## 2020-05-27 NOTE — PROGRESS NOTES
Infectious Disease Progress Note Today's Date: 5/27/2020 Admit Date: 5/24/2020 Impression: · MRSA bacteremia: source would appear to be R foot. Local changes suggest soft tissue infection and removal HD access seems too early. TTE 5.25 not suggestive of endocarditis. · Repeat blood cx 5/25 with 1/4 bottles with GPC: awaiting PCR. Has not had source control yet so may be early to expect clearance. · R foot infection s/p earlier amputations for gangrene. Site cx 5/25  with  GPC and GNR. Awaiting id. WBC down today with broadened coverage. Temps also coming down. Odor from foot has been that of necrotic tissue. Agree with anticipated amputation. · PVD with recent amputations gangrenous toes on both right and left foots. X-ray with bony changes on R but given recent surgery unclear this represents OM. If has amputation/BKA then will not be an issue for treatment duration. · He reports PCN allergy as feeling strange after IM PCN injection when he was in [facility] and Keflex and rash. Overall doubt these are major allergies. · History of AVR and pacemaker Plan:  
· Continue current ABX · Repeat blood cx tomorrow. HE does have pacer and AVR but source overall seems R foot. IF blood fails to clear post op then will need TERRI. · Follow drainage cx 5/25 · Follow surgical findings/interventions. · Follow sensitivities on isolates to confirm Clinda Sensitive: if not can stop. · Unclear that x-ray imaging in setting recent surgery would be sufficient to make Dx osteo · Duration to be determined by events, repeat cx, TTE and surgical events/findings. Anti-infectives:  
Vancomycin 5/24- Clindamycin 5/24- Levaquin 5/24- Subjective:  
 
Temps coming down as is WBC. Getting transfusion prior surgery today; laying quietly. Review of Systems:  A comprehensive review of systems was negative except for that written in the History of Present Illness. Objective:  
 
Visit Vitals /76 Pulse 91 Temp 98.2 °F (36.8 °C) Resp 16 Ht 5' 10\" (1.778 m) Wt 140.7 kg (310 lb 3.2 oz) SpO2 96% BMI 44.51 kg/m² Temp (24hrs), Av.3 °F (36.8 °C), Min:97.8 °F (36.6 °C), Max:99.3 °F (37.4 °C) Lines:  brown Physical Exam: Exam:   
 General: NC/AT, alert and cooperative, looks stated age, in NAD HEENT: eyes closed Neck: supple, symmetric Cardiac: 
 Pulmonary: 
 Abdomen: soft,non-distended Brown : yes Skin: foot wrapped MSK:no enlarged or swollen joints in limbs Extremities: Psychiatric: mood and affect appropriate to situation Physical exam is performed in entirety daily by myself and reviewed when copied from colleague. Changes are made as reflected on daily exam. If no changes are made this reflects that exam is performed and unchanged. Data Review: CBC:  
Recent Labs  
  20 
0559 20 
1525 20 
0108 20 
1604 WBC 16.0* 21.2* 26.2* 15.6*  
RBC 2.55* 2.59* 3.15* 2.97* HGB 6.8* 7.0* 8.5* 8.1* HCT 22.6* 23.3* 29.1* 26.0*  
 243 234 257 GRANS  --   --  92* 93* LYMPH  --   --  3* 2* EOS  --   --  0* 0*  
 
CMP:  
Recent Labs  
  20 
0559 20 
0108 20 
1604 * 232* 200*  138 136  
K 4.1 4.8 4.0  
 105 100 CO2 27 25 28 BUN 41* 32* 30* CREA 2.00* 2.16* 2.08* CA 8.1* 8.0* 8.0* AGAP 8 8 8 AP  --  123 139* TP  --  7.1 7.0 ALB  --  1.8* 1.9*  
GLOB  --  5.3* 5.1* AGRAT  --  0.3* 0.4* Liver Enzymes:  
Recent Labs  
  20 
0108 TP 7.1 ALB 1.8*  Microbiology:  
 
All Micro Results Procedure Component Value Units Date/Time CULTURE, [facility] STAIN [[id number]]  (Abnormal) Collected:  20 0200 Order Status:  Completed Specimen:  Wound from Foot Updated:  20 0830   Special Requests: NO SPECIAL REQUESTS     
  GRAM STAIN NO WBCS SEEN     
   NO EPITHELIAL CELLS SEEN     
      
  MODERATE GRAM POSITIVE COCCI  
     
      
 MODERATE GRAM NEGATIVE RODS Culture result:    
  HEAVY GRAM NEGATIVE RODS IDENTIFICATION AND SUSCEPTIBILITY TO FOLLOW MODERATE GRAM POSITIVE COCCI IDENTIFICATION AND SUSCEPTIBILITY TO FOLLOW CULTURE IN PROGRESS,FURTHER UPDATES TO FOLLOW CULTURE, BLOOD [[id number]] Collected:  05/25/20 1543 Order Status:  Completed Specimen:  Blood Updated:  05/27/20 3475 Special Requests: --     
  RIGHT 
HAND Culture result: NO GROWTH 2 DAYS     
 CULTURE, BLOOD [[id number]]  (Abnormal) Collected:  05/24/20 1604 Order Status:  Completed Specimen:  Blood Updated:  05/27/20 0715 Special Requests: LEFT ANTECUBITAL     
  GRAM STAIN GRAM POSITIVE COCCI AEROBIC AND ANAEROBIC BOTTLES RESULTS VERIFIED, PHONED TO AND READ BACK BY Shalom Soni @ 0503 5/25/20 MM Culture result: STAPHYLOCOCCUS AUREUS     
   REFER TO [employer]ANEL [address] [id number] REPEATING SUSCEPTIBILITY CULTURE, URINE [[id number]]  (Abnormal) Collected:  05/24/20 1900 Order Status:  Completed Specimen:  Cath Urine Updated:  05/27/20 4333 Special Requests: NO SPECIAL REQUESTS Culture result:    
  6000 COLONIES/mL YEAST IDENTIFICATION TO FOLLOW CULTURE, BLOOD [[id number]] Collected:  05/25/20 1543 Order Status:  Completed Specimen:  Blood Updated:  05/27/20 0147 Special Requests: --     
  RIGHT Antecubital 
  
  GRAM STAIN GRAM POSITIVE COCCI AEROBIC BOTTLE POSITIVE RESULTS VERIFIED, PHONED TO AND READ BACK BY OLYA PERALTA @ 0554 5/27/20 MM Culture result:    
  CULTURE IN PROGRESS,FURTHER UPDATES TO FOLLOW CULTURE, BLOOD [[id number]]  (Abnormal) Collected:  05/24/20 1604 Order Status:  Completed Specimen:  Blood Updated:  05/26/20 0750   Special Requests: RIGHT ANTECUBITAL     
  GRAM STAIN    
  GRAM POS COCCI IN CLUSTERS  
     
      
  AEROBIC AND ANAEROBIC BOTTLES  
     
 CRITICAL RESULT NOT CALLED DUE TO PREVIOUS NOTIFICATION OF CRITICAL RESULT WITHIN THE LAST 24 HOURS. Culture result: STAPHYLOCOCCUS AUREUS     
      
  CULTURE IN [address] UPDATES TO FOLLOW  
     
 BLOOD CULTURE ID PANEL [[id number]]  (Abnormal) Collected:  05/24/20 1604 Order Status:  Completed Specimen:  Blood Updated:  05/25/20 1175 Acc. no. from [facility] [id number] Staphylococcus Detected Staphylococcus aureus Detected Comment: RESULTS VERIFIED, PHONED TO AND READ BACK BY 
Dimitri Love RN @ 7694 ON 5/25/2020 AK. mecA (Methicillin-Resistance Genes) Detected Comment: Presence of mecA is highly indicative of methicillin resistance. The test does not replace traditional culture and susceptibilities RESULTS VERIFIED, PHONED TO AND READ BACK BY 
Dimitri Love RN @ 7725 ON 05/25/2020 AK. INTERPRETATION Gram positive cocci in clusters, identified by realtime PCR as SUSPECTED MRSA. Comment: Recommend IV vancomycin use, unlikely to be a contaminant. Infectious Diseases Consult recommended in adult patients. THIS TEST DOES NOT REPLACE SENSITIVITY TESTING. Imaging: No new Signed By: Darrius Portillo MD   
 May 27, 2020

## 2020-05-27 NOTE — PROGRESS NOTES
Kim Rutherford Admission Date: 5/24/2020 Daily Progress Note: 5/27/2020 Here with sepsis/ osteomyelitis and bilateral gangrenous toe. Septic shock improved, IV abx given and transferred to the floor. Vascular surgery consulted. CXR consistent with acute pulmonary edema/CHF.  Has preserved LVEF on recent ECHO, hx of pA.fib. Given IV Lasix. Was admitted to ICU on BIPAP, DNR confirmed with severe sepsis, DEJUAN , presumed CHF and concern for osteomyelitis source with bilateral malodorous feet. Continued on Vancomycin, consulted intensivist, ID, cardiology, vascular, wound care. Was recently discharged by Dr. Yonathan Yanes, vascular surgery service May 15 following right fourth left second and third toe amputations secondary to gangrene. Was discharged home on Midrin and Florinef. He is morbidly obesity with chronic 2-3 L home O2, bilateral carotid stenosis with CAD s/p CABG with AVR 1/10/20 by Dr. Betty Cabrera complicated by CKD requiring HD, HIT +, DVTs, wound left thigh and pneumonia, PVD, DM with diabetic nephropathy. Chronic anticoagulation with warfarin for paroxysmal A. Fib. Subjective: Wore BiPAP last night. hgb down to 6.8. On 10 lpm  
Reports he feels anxious Review of Systems Cardiovascular: positive for ESTRELLA Current Facility-Administered Medications Medication Dose Route Frequency  0.9% sodium chloride infusion 250 mL  250 mL IntraVENous PRN  
 vancomycin (VANCOCIN) 1250 mg in  ml infusion  1,250 mg IntraVENous Q24H  
 famotidine (PEPCID) tablet 20 mg  20 mg Oral BID  insulin glargine (LANTUS) injection 25 Units  25 Units SubCUTAneous QHS  NUTRITIONAL SUPPORT ELECTROLYTE PRN ORDERS   Does Not Apply PRN  
 meropenem (MERREM) 500 mg in 0.9% sodium chloride (MBP/ADV) 50 mL  0.5 g IntraVENous Q8H  
 sodium chloride (NS) flush 5-40 mL  5-40 mL IntraVENous Q8H  
 sodium chloride (NS) flush 5-40 mL  5-40 mL IntraVENous PRN  
  acetaminophen (TYLENOL) tablet 650 mg  650 mg Oral Q4H PRN  
 albuterol (PROVENTIL VENTOLIN) nebulizer solution 2.5 mg  2.5 mg Nebulization Q6H RT  
 insulin lispro (HUMALOG) injection   SubCUTAneous AC&HS  
 buPROPion (WELLBUTRIN) tablet 75 mg  75 mg Oral BID  ALPRAZolam (XANAX) tablet 0.5 mg  0.5 mg Oral QHS PRN  
 midodrine (PROAMATINE) tablet 10 mg  10 mg Oral TID  OLANZapine (ZyPREXA) tablet 5 mg  5 mg Oral QHS  mirtazapine (REMERON) tablet 15 mg  15 mg Oral QHS  sertraline (ZOLOFT) tablet 100 mg  100 mg Oral DAILY  tamsulosin (FLOMAX) capsule 0.4 mg  0.4 mg Oral DAILY  clindamycin (CLEOCIN) 600mg D5W 50mL IVPB (premix)  600 mg IntraVENous Q8H  
 furosemide (LASIX) injection 40 mg  40 mg IntraVENous BID Objective:  
 
Vitals:  
 05/27/20 5060 05/27/20 3012 05/27/20 0703 05/27/20 2677 BP: 116/64  120/64 Pulse: 96  92 Resp: 20  20 Temp: 98 °F (36.7 °C)  98.2 °F (36.8 °C) SpO2: 97%  95% 94% Weight:  310 lb 3.2 oz (140.7 kg) Height:      
 
Intake and Output:  
05/25 1901 - 05/27 0700 In: 787.1 [P.O.:100; I.V.:687.1] Out: [address] [facility] No intake/output data recorded. Intake/Output Summary (Last 24 hours) at 5/27/2020 1043 Last data filed at 5/27/2020 5735 Gross per 24 hour Intake  Output 2100 ml Net -2100 ml Physical Exam:          
Constitutional: the patient is obese on 10 lpm, anxious HEENT: Sclera clear, pupils equal, oral mucosa moist 
Lungs: few bilateral posterior crackles Cardiovascular: IRR without M,G,R 
Abd/GI: soft and non-tender; with positive bowel sounds. rounded Ext: warm without cyanosis. There is no lower leg edema. Musculoskeletal: moves all four extremities with equal strength, right toe wound Skin: no jaundice or rashes, toe on right foot with + wound Neuro: no gross neuro deficits, anxious Lines/Drains: PIV,  brown Nutrition: NPO 
 
CHEST XRAY: ordered 5/24 LAB Recent Labs  
  05/27/20 1638 05/26/20 
1525 05/25/20 
0442 WBC 16.0* 21.2* 26.2* HGB 6.8* 7.0* 8.5* HCT 22.6* 23.3* 29.1*  
 243 234 Recent Labs  
  05/27/20 
0559 05/26/20 
1640 05/26/20 
1525  05/25/20 2033 05/25/20 
0108 05/24/20 
1915 05/24/20 
1604   --   --   --   --  138  --  136  
K 4.1  --   --   --   --  4.8  --  4.0  
  --   --   --   --  105  --  100 CO2 27  --   --   --   --  25  --  28  
*  --   --   --   --  232*  --  200* BUN 41*  --   --   --   --  32*  --  30* CREA 2.00*  --   --   --   --  2.16*  --  2.08* MG  --   --   --   --   --  1.7*  --   --   
TROIQ  --   --   --   --  <0.02* <0.02* <0.02*  --   
INR 2.4 5.0* >9.0*   < >  --   --   --  4.8*  
 < > = values in this interval not displayed. Assessment:  
 
Hospital Problems  Date Reviewed: 5/27/2020 Codes Class Noted POA Pulmonary edema, noncardiac ICD-10-CM: J81.1 ICD-9-CM: 161  5/25/2020 Yes [facility] ICD-10-CM: M86.9 ICD-9-CM: 730.20  5/24/2020 Unknown DEJUAN (acute kidney injury) [facility] ICD-10-CM: N17.9 ICD-9-CM: 584.9  5/24/2020 Yes PAD (peripheral artery disease) (HCC) (Chronic) ICD-10-CM: I73.9 ICD-9-CM: 443.9  5/14/2020 Yes Gangrene of toe of both feet ([facility] ICD-10-CM: M84 
ICD-9-CM: 785.4  5/11/2020 Yes Long term (current) use of anticoagulants (Chronic) ICD-10-CM: Z79.01 
ICD-9-CM: V58.61  4/27/2020 Yes Pacemaker (Chronic) ICD-10-CM: Z95.0 ICD-9-CM: V45.01  4/27/2020 Yes Overview Signed 5/6/2020  9:46 PM by Allen Abbasi MD  
  Dual chamber Biotronik pacemaker placed (1/24/20): AV block post op AVR. 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 
ICD-9-CM: 427.31  1/13/2020 Yes Overview Signed 5/6/2020  9:46 PM by Carmencita Ayala MD  
  Post op CABG. CAD (coronary artery disease) (Chronic) ICD-10-CM: I25.10 ICD-9-CM: 414.00  1/10/2020 Yes Overview Signed 5/6/2020  1:19 PM by Carmencita Ayala MD  
  CABG (1/16/20):  LIMA to LAD. SVG to OM. SVG to PDA. S/P CABG x 3 (Chronic) ICD-10-CM: Z95.1 ICD-9-CM: V45.81  1/10/2020 Yes Status post aortic valve replacement (Chronic) ICD-10-CM: Z95.2 ICD-9-CM: V43.3  1/10/2020 Yes Overview Addendum 5/6/2020  9:43 PM by Carmencita Ayala MD  
  1. AVR (1/16/20):  25 mm Intuity Jon Gutierres Valve. 2.  Echo (4/12/20):  EF 55-60%. Normal functioning AVR. MG 15. PG 26. Mild MR. 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 
ICD-9-CM: 250.40, 583.81  8/26/2019 Yes Obesity, morbid ([facility] (Chronic) ICD-10-CM: E66.01 
ICD-9-CM: 278.01  10/19/2018 Yes PLAN:  
-- Hgb down to 6.8, transfuse today with CAD. H2 blocker BID. Coumadin on hold- INR 2.4 (down) --on 10 lpm with sa t~ 90%, CXR today  
-- continue lasix, watch I/O, B/P and renal function -- vascular surgery consulted for toe 
-- on celocin, merrem, vancomycin 
--Vascular surgery planning right foot debridement now Ladi Carmona NP-C I have spoken with and examined the patient. I agree with the above assessment and plan as documented. Gen: pleasant but tachypneic Lungs: CTA though tachypneic Heart:  RRR with no Murmur/Rubs/Gallops Abd: NTND Ext: R foot with eschar over R great toe 2L negative yesterday 
 
 
 
--needs continued diuresis and management of pulmonary edema 
--may require extra dose of lasix today. --BiPAP nightly. --wean oxygen as tolerated.  
 
Gaylord Cogan, MD 
 
 
More than 50% of time documented was spent in face-to-face contact with the patient and in the care of the patient on the floor/unit where the patient is located.

## 2020-05-27 NOTE — PROGRESS NOTES
Warfarin dosing per pharmacist 
 
Shivani Pollard is a 69 y.o. male. Height: 5' 10\" (177.8 cm)    Weight: 140.7 kg (310 lb 3.2 oz) Indication:  Prevention of thromboembolism (prosthetic heart valves) Goal INR:  2-3 Home dose:  7.5 mg MWF, 5 mg all other days Risk factors or significant drug interactions:  None Other anticoagulants:  N/A *History of HIT Daily Monitoring Date  INR     Warfarin dose HGB              Notes 5/25  7.4  Hold  8.5 
5/26  >9.9  Hold  7.0 10 mg PO Vit K admin 5/27  2.4  Hold  6.8        1 unit PRBC ordered Patient with labile INRs since admission, increased to > 9.9 yesterday. 10 mg of oral vit k given, today down to 2.4 which is in the therapeutic range. HGB down to 6.8, one unit PRBC ordered. Will continue to hold warfarin due to possible procedure per the hospitalist note. Please notify pharmacy when to resume warfarin therapy. Thank you, Lokesh Campbell, PharmD, BCPS Clinical Pharmacy Specialist 
[phone]

## 2020-05-27 NOTE — PROGRESS NOTES
Hospitalist Note Admit Date:  2020  4:04 PM  
Name:  Shivani Pollard Age:  69 y.o. 
:  1945 MRN:  [id number] PCP:  Prateek Iglesias MD 
Treatment Team: Attending Provider: Christiaon Hannon MD; Consulting Provider: Mayra Robles MD; Consulting Provider: Alma Rosa Palencia MD; Consulting Provider: Pam Weston MD; Consulting Provider: Radha Gutierrez MD; Utilization Review: Kaylie House RN; Consulting Provider: Spring Sanford NP; Care Manager: Jax Best, RN; Primary Nurse: Carlos Navarro RN 
 
HPI/Subjective:  
Shivani Pollard is a 69 y.o. male with past medical history significant for DVT, HIT, A. fib, morbid obesity, osteomyelitis, CKD, hemodialysis, hypertension, sleep apnea and type 2 diabetes who was brought in by EMS after heaving a near syncopal episode at home. He was admitted to ICU due to acute respiratory failure, severe sepsis with lactic acid of 3, hypotension, BIPAP and pressor support. The patient had a recent surgery by Dr Castro Grant on may 15 for removal of toes due to gangrene. Initially patient does not want to be intubated even for surgical procedure and was refusing amputation. Palliative care spoke with the patient on  and patient is now agreeable to amputation and intubation during surgical procedure. : 
Out of ICU yesterday. Labs this morning show hg 6.8 and INR 2.4, down from greater than 9 yesterday. Surgery was planned for today if INR were below 2.  
2L Negative fluid balance yesterday, increased O2 requirement, back up to 10L. Pt reports didn't sleep well and tired, denies worse dyspnea or work of breathing. No cp. No fevers. Objective:  
 
Patient Vitals for the past 24 hrs: 
 Temp Pulse Resp BP SpO2  
20 0703 98.2 °F (36.8 °C) 92 20 120/64 95 % 20 0503 98 °F (36.7 °C) 96 20 116/64 97 % 20 0221     97 % 20 2349 98.9 °F (37.2 °C) (!) 119 20 137/89 95 % 05/26/20 2124  (!) 128   92 % 05/26/20 1938 99.3 °F (37.4 °C) (!) 126 22 120/69 90 % 05/26/20 1926     90 % 05/26/20 1505 98.3 °F (36.8 °C) 88 22 113/67 92 % 05/26/20 1345  82 23 101/55 94 % 05/26/20 1300  81 26 98/54 93 % 05/26/20 1200 97.8 °F (36.6 °C) 85 27 110/57 93 % 05/26/20 1100  85 23 106/57 93 % 05/26/20 1030  89 24 111/59 92 % 05/26/20 1000  90 17 104/57 91 % 05/26/20 0900  86 29 107/58 93 % Oxygen Therapy O2 Sat (%): 95 % (05/27/20 0703) Pulse via Oximetry: 125 beats per minute (05/27/20 0221) O2 Device: BIPAP (05/27/20 0221) O2 Flow Rate (L/min): 5 l/min (05/26/20 1926) FIO2 (%): 45 % (05/27/20 0221) Estimated body mass index is 44.51 kg/m² as calculated from the following: 
  Height as of this encounter: 5' 10\" (1.778 m). Weight as of this encounter: 140.7 kg (310 lb 3.2 oz). Intake/Output Summary (Last 24 hours) at 5/27/2020 6296 Last data filed at 5/27/2020 8798 Gross per 24 hour Intake 100 ml Output 2100 ml Net -2000 ml *Note that automatically entered I/Os may not be accurate; dependent on patient compliance with collection and accurate  by techs. Physical Exam:  
General:     alert, awake,Ill appearing. Obese Head:   normocephalic, atraumatic Eyes, Ears, nose: PERRL, EOMI. Normal conjunctiva Neck:    supple, non-tender. Trachea midline. Lungs:   Coarse bases, L>R Cardiac:   RRR, Normal S1 and S2. Abdomen:   Soft, non distended, nontender, +BS, no guarding/rebound Extremities:   Warm, dry. 2+ edema. Multiple toe amputations, purulent discharge Skin:   No rashes, no jaundice Neuro:  AAOx3. No gross focal neurological deficit Psychiatric:  No anxiety, calm, cooperative Data Review: 
I have reviewed all labs, meds, and studies from the last 24 hours: 
 
Recent Results (from the past 24 hour(s)) GLUCOSE, POC Collection Time: 05/26/20 11:10 AM  
Result Value Ref Range Glucose (POC) 204 (H) 65 - 100 mg/dL PROTHROMBIN TIME + INR Collection Time: 05/26/20  3:25 PM  
Result Value Ref Range Prothrombin time 53.2 (H) 12.0 - 14.7 sec INR >9.0 (HH)   
CBC W/O DIFF Collection Time: 05/26/20  3:25 PM  
Result Value Ref Range WBC 21.2 (H) 4.3 - 11.1 K/uL  
 RBC 2.59 (L) 4.23 - 5.6 M/uL HGB 7.0 (L) 13.6 - 17.2 g/dL HCT 23.3 (L) 41.1 - 50.3 % MCV 90.0 79.6 - 97.8 FL  
 MCH 27.0 26.1 - 32.9 PG  
 MCHC 30.0 (L) 31.4 - 35.0 g/dL  
 RDW 15.7 (H) 11.9 - 14.6 % PLATELET 239 021 - 650 K/uL MPV 10.7 9.4 - 12.3 FL ABSOLUTE NRBC 0.00 0.0 - 0.2 K/uL PROTHROMBIN TIME + INR Collection Time: 05/26/20  4:40 PM  
Result Value Ref Range Prothrombin time 47.8 (H) 12.0 - 14.7 sec INR 5.0 (HH) GLUCOSE, POC Collection Time: 05/26/20  5:29 PM  
Result Value Ref Range Glucose (POC) 206 (H) 65 - 100 mg/dL GLUCOSE, POC Collection Time: 05/26/20  8:29 PM  
Result Value Ref Range Glucose (POC) 245 (H) 65 - 100 mg/dL PROTHROMBIN TIME + INR Collection Time: 05/27/20  5:59 AM  
Result Value Ref Range Prothrombin time 26.5 (H) 12.0 - 14.7 sec INR 2.4    
CBC W/O DIFF Collection Time: 05/27/20  5:59 AM  
Result Value Ref Range WBC 16.0 (H) 4.3 - 11.1 K/uL  
 RBC 2.55 (L) 4.23 - 5.6 M/uL HGB 6.8 (LL) 13.6 - 17.2 g/dL HCT 22.6 (L) 41.1 - 50.3 % MCV 88.6 79.6 - 97.8 FL  
 MCH 26.7 26.1 - 32.9 PG  
 MCHC 30.1 (L) 31.4 - 35.0 g/dL  
 RDW 15.6 (H) 11.9 - 14.6 % PLATELET 615 313 - 961 K/uL MPV 10.8 9.4 - 12.3 FL ABSOLUTE NRBC 0.00 0.0 - 0.2 K/uL METABOLIC PANEL, BASIC Collection Time: 05/27/20  5:59 AM  
Result Value Ref Range Sodium 139 136 - 145 mmol/L Potassium 4.1 3.5 - 5.1 mmol/L Chloride 104 98 - 107 mmol/L  
 CO2 27 21 - 32 mmol/L Anion gap 8 7 - 16 mmol/L Glucose 170 (H) 65 - 100 mg/dL BUN 41 (H) 8 - 23 MG/DL Creatinine 2.00 (H) 0.8 - 1.5 MG/DL  
 GFR est AA 42 (L) >60 ml/min/1.73m2 GFR est non-AA 35 (L) >60 ml/min/1.73m2 Calcium 8.1 (L) 8.3 - 10.4 MG/DL  
GLUCOSE, POC Collection Time: 05/27/20  7:11 AM  
Result Value Ref Range Glucose (POC) 187 (H) 65 - 100 mg/dL All Micro Results Procedure Component Value Units Date/Time CULTURE, BLOOD [[id number]] Collected:  05/25/20 1543 Order Status:  Completed Specimen:  Blood Updated:  05/27/20 2314 Special Requests: --     
  RIGHT 
HAND Culture result: NO GROWTH 2 DAYS     
 CULTURE, BLOOD [[id number]]  (Abnormal) Collected:  05/24/20 1604 Order Status:  Completed Specimen:  Blood Updated:  05/27/20 0715 Special Requests: LEFT ANTECUBITAL     
  GRAM STAIN GRAM POSITIVE COCCI AEROBIC AND ANAEROBIC BOTTLES RESULTS VERIFIED, PHONED TO AND READ BACK BY Josie Elena @ 0503 5/25/20 MM Culture result: STAPHYLOCOCCUS AUREUS     
   REFER TO BIOFIRE  PANEL [facility] [id number] REPEATING SUSCEPTIBILITY CULTURE, URINE [[id number]]  (Abnormal) Collected:  05/24/20 1900 Order Status:  Completed Specimen:  Cath Urine Updated:  05/27/20 7378 Special Requests: NO SPECIAL REQUESTS Culture result:    
  6000 COLONIES/mL YEAST IDENTIFICATION TO FOLLOW CULTURE, BLOOD [[id number]] Collected:  05/25/20 1543 Order Status:  Completed Specimen:  Blood Updated:  05/27/20 0147 Special Requests: --     
  RIGHT Antecubital 
  
  GRAM STAIN GRAM POSITIVE COCCI AEROBIC BOTTLE POSITIVE RESULTS VERIFIED, PHONED TO AND READ BACK BY OLYA PERALTA @ 8000 5/27/20 MM Culture result:    
  CULTURE IN PROGRESS,FURTHER UPDATES TO FOLLOW CULTURE, Francisco Aldridge STAIN [[id number]]  (Abnormal) Collected:  05/25/20 0200 Order Status:  Completed Specimen:  Wound from Foot Updated:  05/26/20 4257   Special Requests: NO SPECIAL REQUESTS     
  GRAM STAIN NO WBCS SEEN     
   NO EPITHELIAL CELLS SEEN     
      
  MODERATE GRAM POSITIVE COCCI  
 MODERATE GRAM NEGATIVE RODS Culture result: MODERATE GRAM NEGATIVE RODS SUBCULTURE IN PROGRESS  
     
      
  CULTURE IN PROGRESS,FURTHER UPDATES TO FOLLOW CULTURE, BLOOD [[id number]]  (Abnormal) Collected:  05/24/20 1604 Order Status:  Completed Specimen:  Blood Updated:  05/26/20 0750 Special Requests: RIGHT ANTECUBITAL     
  GRAM STAIN    
  GRAM POS COCCI IN CLUSTERS  
     
      
  AEROBIC AND ANAEROBIC BOTTLES  
     
      
  CRITICAL RESULT NOT CALLED DUE TO PREVIOUS NOTIFICATION OF CRITICAL RESULT WITHIN THE LAST 24 HOURS. Culture result: STAPHYLOCOCCUS AUREUS     
      
  CULTURE IN [address] UPDATES TO FOLLOW  
     
 BLOOD CULTURE ID PANEL [[id number]]  (Abnormal) Collected:  05/24/20 1604 Order Status:  Completed Specimen:  Blood Updated:  05/25/20 7886 Acc. no. from [employer] [id number] Staphylococcus Detected Staphylococcus aureus Detected Comment: RESULTS VERIFIED, PHONED TO AND READ BACK BY 
Cheryle Bunting RN @ 7978 ON 5/25/2020 AK. mecA (Methicillin-Resistance Genes) Detected Comment: Presence of mecA is highly indicative of methicillin resistance. The test does not replace traditional culture and susceptibilities RESULTS VERIFIED, PHONED TO AND READ BACK BY 
Cheryle Bunting RN @ 6716 ON 05/25/2020 AK. INTERPRETATION Gram positive cocci in clusters, identified by realtime PCR as SUSPECTED MRSA. Comment: Recommend IV vancomycin use, unlikely to be a contaminant. Infectious Diseases Consult recommended in adult patients. THIS TEST DOES NOT REPLACE SENSITIVITY TESTING. Current Meds: 
Current Facility-Administered Medications Medication Dose Route Frequency  0.9% sodium chloride infusion 250 mL  250 mL IntraVENous PRN  
 insulin glargine (LANTUS) injection 25 Units  25 Units SubCUTAneous QHS  vancomycin trough reminder   Other ONCE  
  NUTRITIONAL SUPPORT ELECTROLYTE PRN ORDERS   Does Not Apply PRN  
 meropenem (MERREM) 500 mg in 0.9% sodium chloride (MBP/ADV) 50 mL  0.5 g IntraVENous Q8H  
 famotidine (PEPCID) tablet 20 mg  20 mg Oral DAILY  sodium chloride (NS) flush 5-40 mL  5-40 mL IntraVENous Q8H  
 sodium chloride (NS) flush 5-40 mL  5-40 mL IntraVENous PRN  
 acetaminophen (TYLENOL) tablet 650 mg  650 mg Oral Q4H PRN  
 albuterol (PROVENTIL VENTOLIN) nebulizer solution 2.5 mg  2.5 mg Nebulization Q6H RT  
 insulin lispro (HUMALOG) injection   SubCUTAneous AC&HS  
 buPROPion (WELLBUTRIN) tablet 75 mg  75 mg Oral BID  ALPRAZolam (XANAX) tablet 0.5 mg  0.5 mg Oral QHS PRN  
 midodrine (PROAMATINE) tablet 10 mg  10 mg Oral TID  OLANZapine (ZyPREXA) tablet 5 mg  5 mg Oral QHS  mirtazapine (REMERON) tablet 15 mg  15 mg Oral QHS  sertraline (ZOLOFT) tablet 100 mg  100 mg Oral DAILY  tamsulosin (FLOMAX) capsule 0.4 mg  0.4 mg Oral DAILY  vancomycin (VANCOCIN) 1750 mg in  ml infusion  1,750 mg IntraVENous Q24H  clindamycin (CLEOCIN) 600mg D5W 50mL IVPB (premix)  600 mg IntraVENous Q8H  
 furosemide (LASIX) injection 40 mg  40 mg IntraVENous BID Other Studies: 
 
Xr Foot Rt Ap/lat Result Date: 5/24/2020 Right foot CLINICAL INDICATION: Right foot swelling and erythema, recent toe amputations FINDINGS: Two views of the right foot show amputation across the MTP joints of the first through fourth toes. There are small bone fragments in the amputation plane with irregular soft tissue swelling and thickening noted distally at the level the amputation. There is irregularity along the bony cortices of the first, second, and third metatarsal heads. Osteomyelitis cannot be excluded by plain radiography. The fifth toe is unremarkable. IMPRESSION: Soft tissue swelling and irregularity along the amputation plane of the right first through fourth toes.  Osteomyelitis is a consideration. Consider further evaluation with MRI to better evaluate for osteomyelitis. Xr Chest [address] Result Date: 5/24/2020 Portable chest x-ray CLINICAL INDICATION: Dyspnea FINDINGS: Single AP view of the chest compared to a similar exam dated 5/15/2020 shows stable bilateral pulmonary edema pattern. A pacer device is in place. Postcardiac valve replacement changes evident. Small bilateral pleural effusions suspected. IMPRESSION: Acute CHF exacerbation. Assessment and Plan: Active Hospital Problems Diagnosis Date Noted  Pulmonary edema, noncardiac 05/25/2020  Lactic acidosis 05/24/2020  Osteomyelitis ([address] 05/24/2020  Severe sepsis with septic shock ([address] 05/24/2020  DEJUAN (acute kidney injury) ([address] 05/24/2020  PAD (peripheral artery disease) ([address] 05/14/2020  Hypoxia 05/14/2020  Gangrenous toe ([address] 05/14/2020  Gangrene of toe of both feet [facility] 05/11/2020  Pacemaker 04/27/2020 Dual chamber Biotronik pacemaker placed (1/24/20): AV block post op AVR.  Long term (current) use of anticoagulants 04/27/2020  Acute on chronic respiratory failure with hypoxia ([address] 03/08/2020  DNR (do not resuscitate) 02/26/2020  Hypotension 02/18/2020  
 HIT (heparin-induced thrombocytopenia) [facility]) 01/23/2020  Atrial fibrillation ([address] 01/13/2020 Post op CABG.  Status post aortic valve replacement 01/10/2020 1. AVR (1/16/20):  25 mm Intuity Jon Gutierres Valve. 2.  Echo (4/12/20):  EF 55-60%. Normal functioning AVR. MG 15. PG 26. Mild MR.  
  
 S/P CABG x 3 01/10/2020  CAD (coronary artery disease) 01/10/2020 CABG (1/16/20):  LIMA to LAD. SVG to OM. SVG to PDA.  Type 2 diabetes with nephropathy ([address] 08/26/2019  Obesity, morbid ([address] 10/19/2018  HLD (hyperlipidemia) 01/14/2013  Gout 01/14/2013  BPH (benign prostatic hyperplasia) 01/14/2013 Plan: Acute on chronic respiratory failure with hypoxia due to pulm edema and sepsis - Pulm edema likely non cardiogenic as EF is normal by Echo. Likely due to renal disease and volume resuscitation in sepsis. - continue IV lasix, additional dose with blood transfusion today 
- O2 supplement (home 2-3L NC) and wean as tolerated - Cardiology following - BiPap at night Severe Sepsis with shock due to gangrene MRSA bacteremia Rt foot infection with possible osteomyelitis - Right foot X-ray: Soft tissue swelling and irregularity along the amputation plane of the right first through fourth toes. - Wound cx (5/24) shows GPC and GNRs 
- TTE is neg for vegitation. TERRI not ideal at this time due to respiratory status. - ID following: IV clindamycin , IV vancomycin, IV Meropenem  
- repeat BCx still positive 5/25 
- Rt foot debridement by Vascular Surgery was planned today but INR elevated still - reverse or delay per surgical recommendations CAD s/p CABG and PPM  
pAfib S/p AVR (1/2020) HTN // HLD 
- hold anti HTN due to hypotension and sepsis 
- holding coumadin due to supra-theraputic INR Supratherapeutic INR 
- INR greater than 9, down to 2.4 today after vitamin K.  
- unclear bleeding source, but Hg drop from 8 to 6.8 
- holding coumadin, repeat vit K vs give FFP pending urgency of surgery. DEJUAN on CKD Stage 4 (baseline Cr. 1.5 - 1.8 in 5/2020) - SCr stable at 2.0 today, continue IV lasix for overload 
 
T2DM 
- lantus 25units qHS and ISS 
- diabetic diet BPH: c/w home meds Diet:  DIET NPO 
DVT PPx: SCDs; INR supratherapeutic Code: DNR Signed By: Kyree Carrillo MD   
 May 27, 2020

## 2020-05-27 NOTE — ANESTHESIA POSTPROCEDURE EVALUATION
Procedure(s): RIGHT FOOT DEBRIDEMENT/ ROOM 608. general 
 
Anesthesia Post Evaluation Multimodal analgesia: multimodal analgesia used between 6 hours prior to anesthesia start to PACU discharge Patient location during evaluation: PACU Patient participation: complete - patient participated Level of consciousness: awake and alert Pain management: adequate Airway patency: patent Anesthetic complications: no 
Cardiovascular status: acceptable Respiratory status: acceptable Hydration status: acceptable Post anesthesia nausea and vomiting:  none INITIAL Post-op Vital signs:  
Vitals Value Taken Time /59 5/27/2020  1:49 PM  
Temp 36.4 °C (97.5 °F) 5/27/2020  1:17 PM  
Pulse 86 5/27/2020  1:49 PM  
Resp 17 5/27/2020  1:40 PM  
SpO2 96 % 5/27/2020  1:49 PM  
Vitals shown include unvalidated device data.

## 2020-05-27 NOTE — PERIOP NOTES
TRANSFER - OUT REPORT: 
 
Verbal report given to Jose J Coleman RN on Rayo Naidu  being transferred to [address] [place] [address] for routine post - op Report consisted of patients Situation, Background, Assessment and  
Recommendations(SBAR). Information from the following report(s) OR Summary, Procedure Summary, Intake/Output and MAR was reviewed with the receiving nurse. Lines:  
Peripheral IV 05/24/20 Right Antecubital (Active) Site Assessment Clean, dry, & intact 5/27/2020  1:17 PM  
Phlebitis Assessment 0 5/27/2020  1:17 PM  
Infiltration Assessment 0 5/27/2020  1:17 PM  
Dressing Status Clean, dry, & intact 5/27/2020  1:17 PM  
Dressing Type Tape;Transparent 5/27/2020  1:17 PM  
Hub Color/Line Status Patent 5/27/2020  1:17 PM  
Alcohol Cap Used No 5/27/2020  1:17 PM  
   
Peripheral IV 05/24/20 Left Hand (Active) Site Assessment Clean, dry, & intact 5/27/2020  1:17 PM  
Phlebitis Assessment 0 5/27/2020  1:17 PM  
Infiltration Assessment 0 5/27/2020  1:17 PM  
Dressing Status Clean, dry, & intact 5/27/2020  1:17 PM  
Dressing Type Tape;Transparent 5/27/2020  1:17 PM  
Hub Color/Line Status Patent 5/27/2020  1:17 PM  
Alcohol Cap Used No 5/27/2020  1:17 PM  
   
Peripheral IV 05/24/20 Left Forearm (Active) Site Assessment Clean, dry, & intact 5/27/2020  1:17 PM  
Phlebitis Assessment 0 5/27/2020  1:17 PM  
Infiltration Assessment 0 5/27/2020  1:17 PM  
Dressing Status Clean, dry, & intact 5/27/2020  1:17 PM  
Dressing Type Tape;Transparent 5/27/2020  1:17 PM  
Hub Color/Line Status Patent 5/27/2020  1:17 PM  
Alcohol Cap Used No 5/27/2020  1:17 PM  
  
 
Opportunity for questions and clarification was provided. Patient transported with: 
 O2 @ 2 liters VTE prophylaxis orders have been written for Rayo Naidu. Patient and family given floor number and nurses name. Family updated re: pt status after security code verified.

## 2020-05-27 NOTE — ANESTHESIA PROCEDURE NOTES
Peripheral Block Start time: 5/27/2020 12:04 PM 
End time: 5/27/2020 12:11 PM 
Performed by: Chris Navarro MD 
Authorized by: Chris Navarro MD  
 
 
Pre-procedure: Indications: at surgeon's request and post-op pain management Preanesthetic Checklist: patient identified, risks and benefits discussed, site marked, timeout performed, anesthesia consent given and patient being monitored Timeout Time: 12:04 Block Type:  
Block Type: Ankle Laterality:  Left Monitoring:  Continuous pulse ox, frequent vital sign checks, heart rate, oxygen and responsive to questions Injection Technique:  Single shot Patient Position: supine Prep: DuraPrep Location:  Foot Needle Type:  Stimuplex Needle Gauge:  22 G Assessment: 
Number of attempts:  1 Injection Assessment:  Incremental injection every 5 mL, negative aspiration for blood, no paresthesia and no intravascular symptoms Patient tolerance:  Patient tolerated the procedure well with no immediate complications

## 2020-05-27 NOTE — PROGRESS NOTES
Hourly rounds completed. All needs met. Pt c/o anxiety. Interventions per MAR. Pt NPO since MN. Pt restless during the shift even after intervention. Gave report to the Pre-OP nurse. Per Pre-OP nurse, all meds except for lasix should be given during am med pass. 6490 Critical hgb of 6.8. MD made aware Will give report to the oncoming day shift nurse.

## 2020-05-27 NOTE — PROGRESS NOTES
Hourly rounds performed. All needs meet. Patient alert/oriented x4. Patient has complete sensation to left foot. Bandage clean, dry, intact. patient O2 Sat at 92% on 10 L NC. Turner patent, draining yellow, clear. Bed low/locked. Call light within reach. Patient denies needs at this time.   Will continue to monitor and report to oncoming RN

## 2020-05-27 NOTE — PROGRESS NOTES
5/27/2020 4:36 PM 
 
Admit Date: 5/24/2020 Admit Diagnosis: Severe sepsis ([facility] [A41.9, R65.20]; Lactic acidosis [E87.2]; Acute CHF (congestive heart failure) ([facility] [I50.9]; Osteomyelitis ([facility] [M86.9]; Hypotension [I95.9] Subjective: No cp or sob Objective:  
  
Visit Vitals /79 Pulse 94 Temp 97.5 °F (36.4 °C) Resp 19 Ht 5' 10\" (1.778 m) Wt 310 lb 3.2 oz (140.7 kg) SpO2 91% BMI 44.51 kg/m² Physical Exam: 
Genet Prier, Well Nourished, No Acute Distress, Alert & Oriented x 3, appropriate mood. Neck- supple, no JVD 
CV- regular rate and rhythm no MRG Lung- clear bilaterally Abd- soft, nontender, nondistended Ext- no edema bilaterally. Skin- warm and dry Data Review:  
Recent Labs  
  05/27/20 
0559   
K 4.1 BUN 41* CREA 2.00* WBC 16.0* HGB 6.8* HCT 22.6*  
 INR 2.4 Assessment/Plan: Active Problems: 
  Obesity, morbid ([facility] (10/19/2018) Type 2 diabetes with nephropathy ([facility] (8/26/2019) CAD (coronary artery disease) (1/10/2020)Stable. Continue current medical therapy. Overview: CABG (1/16/20):  LIMA to LAD. SVG to OM. SVG to PDA. S/P CABG x 3 (1/10/2020) Status post aortic valve replacement (1/10/2020)Stable. Continue current medical therapy. Overview: 1. AVR (1/16/20):  25 mm Intuity Jon Gutierres Valve. 2.  Echo (4/12/20):  EF 55-60%. Normal functioning AVR. MG 15. PG 26. Mild MR. Atrial fibrillation ([facility] (1/13/2020) Overview: Post op CABG. HIT (heparin-induced thrombocytopenia) ([facility] (1/23/2020) Hypotension (2/18/2020) DNR (do not resuscitate) (2/26/2020) Long term (current) use of anticoagulants (4/27/2020) Pacemaker (4/27/2020) Overview: Dual chamber Biotronik pacemaker placed (1/24/20): AV block post op AVR. Gangrene of toe of both feet ([facility] (5/11/2020) PAD (peripheral artery disease) ([address] (5/14/2020) Osteomyelitis ([facility] (5/24/2020) DEJUAN (acute kidney injury) ([facility] (5/24/2020) Pulmonary edema, noncardiac (5/25/2020) GAngere- DOS- doing well- monitor

## 2020-05-27 NOTE — PROGRESS NOTES
Pt o2 weaned from 10L to 8L. Pt anxiety levals effect his o2 demand, due to hyperventilation. Will continue to monitor.

## 2020-05-27 NOTE — PROGRESS NOTES
[address], [place]. [address] [facility] FAX: [phone] Vascular Surgery Progress Note Admit Date: 2020 POD Day of Surgery Procedure:  Procedure(s): RIGHT FOOT DEBRIDEMENT/ ROOM 608 Subjective:  
 
Patient has no new complaints. Objective:  
 
Blood pressure 120/64, pulse 92, temperature 98.2 °F (36.8 °C), resp. rate 20, height 5' 10\" (1.778 m), weight 310 lb 3.2 oz (140.7 kg), SpO2 94 %. Temp (24hrs), Av.4 °F (36.9 °C), Min:97.8 °F (36.6 °C), Max:99.3 °F (37.4 °C) Physical Exam:  GENERAL: alert, cooperative, no distress, appears stated age, LUNG:  coarse, diminished throughout, HEART:  regular rate and rhythm, S1, S2 normal, no murmur, click, rub or gallop and EXTREMITIES:  Right Lower Extremity:  purulent drainage from right foot incision. 5th toe remains. sutures in place Labs:  
Recent Labs  
  20 
0559 HGB 6.8* WBC 16.0*  
K 4.1 * Data Review: images and reports reviewed Current Facility-Administered Medications Medication Dose Route Frequency  0.9% sodium chloride infusion 250 mL  250 mL IntraVENous PRN  
 vancomycin (VANCOCIN) 1250 mg in  ml infusion  1,250 mg IntraVENous Q24H  
 insulin glargine (LANTUS) injection 25 Units  25 Units SubCUTAneous QHS  NUTRITIONAL SUPPORT ELECTROLYTE PRN ORDERS   Does Not Apply PRN  
 meropenem (MERREM) 500 mg in 0.9% sodium chloride (MBP/ADV) 50 mL  0.5 g IntraVENous Q8H  
 famotidine (PEPCID) tablet 20 mg  20 mg Oral DAILY  sodium chloride (NS) flush 5-40 mL  5-40 mL IntraVENous Q8H  
 sodium chloride (NS) flush 5-40 mL  5-40 mL IntraVENous PRN  
 acetaminophen (TYLENOL) tablet 650 mg  650 mg Oral Q4H PRN  
 albuterol (PROVENTIL VENTOLIN) nebulizer solution 2.5 mg  2.5 mg Nebulization Q6H RT  
 insulin lispro (HUMALOG) injection   SubCUTAneous AC&HS  
 buPROPion (WELLBUTRIN) tablet 75 mg  75 mg Oral BID  
  ALPRAZolam (XANAX) tablet 0.5 mg  0.5 mg Oral QHS PRN  
 midodrine (PROAMATINE) tablet 10 mg  10 mg Oral TID  OLANZapine (ZyPREXA) tablet 5 mg  5 mg Oral QHS  mirtazapine (REMERON) tablet 15 mg  15 mg Oral QHS  sertraline (ZOLOFT) tablet 100 mg  100 mg Oral DAILY  tamsulosin (FLOMAX) capsule 0.4 mg  0.4 mg Oral DAILY  clindamycin (CLEOCIN) 600mg D5W 50mL IVPB (premix)  600 mg IntraVENous Q8H  
 furosemide (LASIX) injection 40 mg  40 mg IntraVENous BID Assessment:  
 
Principal Problem: 
  Severe sepsis with septic shock ([address] (5/24/2020) Active Problems: 
  Gout (1/14/2013) BPH (benign prostatic hyperplasia) (1/14/2013) HLD (hyperlipidemia) (1/14/2013) Obesity, morbid ([address] (10/19/2018) Type 2 diabetes with nephropathy ([address] (8/26/2019) CAD (coronary artery disease) (1/10/2020) Overview: CABG (1/16/20):  LIMA to LAD. SVG to OM. SVG to PDA. S/P CABG x 3 (1/10/2020) Status post aortic valve replacement (1/10/2020) Overview: 1. AVR (1/16/20):  25 mm Intuity Jon Gutierres Valve. 2.  Echo (4/12/20):  EF 55-60%. Normal functioning AVR. MG 15. PG 26. Mild MR. Atrial fibrillation ([address] (1/13/2020) Overview: Post op CABG. HIT (heparin-induced thrombocytopenia) ([address] (1/23/2020) Hypotension (2/18/2020) DNR (do not resuscitate) (2/26/2020) Acute on chronic respiratory failure with hypoxia ([address] (3/8/2020) Long term (current) use of anticoagulants (4/27/2020) Pacemaker (4/27/2020) Overview: Dual chamber Biotronik pacemaker placed (1/24/20): AV block post op AVR. Gangrene of toe of both feet ([address] (5/11/2020) Gangrenous toe [facility] (5/14/2020) PAD (peripheral artery disease) ([address] (5/14/2020) Hypoxia (5/14/2020) Lactic acidosis (5/24/2020) Osteomyelitis ([facility] (5/24/2020) DEJUAN (acute kidney injury) ([facility] (5/24/2020) Pulmonary edema, noncardiac (5/25/2020) Plan/Recommendations/Medical Decision Making:  
 
Continue present treatment INR 2.4 this AM. Ok from a vascular standpoint to proceed with surgery, will defer to anesthesia re: INR 2.4. NPO To OR today for right foot debridement Signed By: Matt Rubin NP May 27, 2020

## 2020-05-28 NOTE — OP NOTES
[facility] 
OPERATIVE REPORT Name:  Librado Stein 
MR#:  [id number] :  1945 ACCOUNT #:  [de-identified] DATE OF SERVICE:  2020 PREOPERATIVE DIAGNOSIS:  Right foot amputation wound infection. POSTOPERATIVE DIAGNOSIS:  Right foot amputation wound infection. PROCEDURE PERFORMED:  I and D of right foot wound. SURGEON:  German Castaneda MD 
 
ASSISTANT:  None. ANESTHESIA:  Regional block. COMPLICATIONS:  None. SPECIMENS REMOVED:  None. IMPLANTS:  None. ESTIMATED BLOOD LOSS:  Minimal. 
 
DRAINS:  None. DESCRIPTION OF PROCEDURE:  The patient was brought to the operating room and placed on the operating room table in the supine position. Following adequate IV sedation and a time-out procedure, the right foot was draped and prepped in a sterile fashion. There was an area of wound separation on the medial aspect of the incision from the previous amputation. There was a slight drainage. Three sutures were removed and the wound was opened at this level. It was explored with a hemostat and was not found to tract. There was no evidence of pus or retained abscess. At this point, the wound was copiously irrigated with antibiotic solution and packed with a saline-soaked gauze sponge. Sterile dry dressing were then applied. The patient was awakened from anesthesia and transported to the recovery room in stable condition. The patient tolerated the procedure well. No complications. All needle and sponge counts were correct. Kirk Cornejo MD 
 
 
[id number]/ 
D:  2020 16:17 
T:  2020 1:56 JOB #:  [id number]

## 2020-05-28 NOTE — PROGRESS NOTES
Hospitalist Note Admit Date:  2020  4:04 PM  
Name:  Gabi Paredes Age:  69 y.o. 
:  1945 MRN:  [id number] PCP:  Patricio Monge MD 
Treatment Team: Attending Provider: Jennifer Falcon MD; Consulting Provider: Jalen Landaverde MD; Consulting Provider: Lucho Ndiaye MD; Consulting Provider: Omar Fernando MD; Consulting Provider: Lanette Moreland MD; Utilization Review: Kandi Vee RN; Consulting Provider: Naveed Koenig NP; Care Manager: Cira Cole RN 
 
HPI/Subjective:  
Gabi Paredes is a 69 y.o. male with past medical history significant for DVT, HIT, A. fib, morbid obesity, osteomyelitis, CKD, hemodialysis, hypertension, sleep apnea and type 2 diabetes who was brought in by EMS after heaving a near syncopal episode at home. He was admitted to ICU due to acute respiratory failure, severe sepsis with lactic acid of 3, hypotension, BIPAP and pressor support. The patient had a recent surgery by Dr Rik Duggan on may 15 for removal of toes due to gangrene. Initially patient does not want to be intubated even for surgical procedure and was refusing amputation. Palliative care spoke with the patient on  and patient is now agreeable to amputation and intubation during surgical procedure. : 
Washout yesterday well tolerated. Increased O2 requirement this morning, improving after increased lasix per cardiology. ESBL and MRSA from foot culture. ID following, already on vanco/meropenem. Voice hoarse, but feels better overall. No fevers. Objective:  
 
Patient Vitals for the past 24 hrs: 
 Temp Pulse Resp BP SpO2  
20 0845     92 % 20 0632 97.9 °F (36.6 °C) 82 18 146/77 91 % 20 0516 98.4 °F (36.9 °C) 86 20 123/63 98 % 20 0204     96 % 20 0154 98.6 °F (37 °C) 85  133/74 95 % 20 2000 98.5 °F (36.9 °C) 86  128/72 94 % 20 1925     96 % 05/27/20 1713 98.6 °F (37 °C) 85 18 104/72 95 % 05/27/20 1448 97.5 °F (36.4 °C) 94 19 152/79 91 % 05/27/20 1447     94 % 05/27/20 1349 98 °F (36.7 °C) 90 18 112/59 97 % 05/27/20 1345  85 18 106/52 96 % 05/27/20 1340  89 17 110/55 96 % 05/27/20 1335  87 17 109/53 98 % 05/27/20 1331  86 16 106/51 95 % 05/27/20 1325  87 16 116/58 96 % 05/27/20 1320  89 16 118/58 95 % 05/27/20 1317 97.5 °F (36.4 °C) 90 19 140/69 95 % 05/27/20 1226  88 25 148/80 96 % 05/27/20 1221  88 22 145/76 95 % 05/27/20 1216  90 18 145/74 94 % 05/27/20 1211  87 18 131/73 95 % 05/27/20 1206  89 18 155/76 97 % 05/27/20 1200 98.3 °F (36.8 °C) 87 16 162/76 96 % 05/27/20 1136 98.2 °F (36.8 °C) 91 16 130/76 96 % 05/27/20 1116 98 °F (36.7 °C) 86 18 129/72 93 % Oxygen Therapy O2 Sat (%): 92 % (05/28/20 0845) Pulse via Oximetry: 78 beats per minute (05/28/20 0845) O2 Device: Nasal cannula (05/28/20 0845) O2 Flow Rate (L/min): 5 l/min (05/28/20 0845) FIO2 (%): 45 % (05/27/20 0221) Estimated body mass index is 44 kg/m² as calculated from the following: 
  Height as of this encounter: 5' 10\" (1.778 m). Weight as of this encounter: 139.1 kg (306 lb 10.6 oz). Intake/Output Summary (Last 24 hours) at 5/28/2020 1054 Last data filed at 5/28/2020 9804 Gross per 24 hour Intake 396.1 ml Output 1352 ml Net -955.9 ml  
   
*Note that automatically entered I/Os may not be accurate; dependent on patient compliance with collection and accurate  by techs. Physical Exam:  
General:     alert, awake,Ill appearing. Obese Head:   normocephalic, atraumatic Eyes, Ears, nose: PERRL, EOMI. Normal conjunctiva Neck:    supple, non-tender. Trachea midline. Lungs:   Crackles bases Cardiac:   RRR, Normal S1 and S2. Abdomen:   Soft, non distended, nontender, +BS, no guarding/rebound Extremities:   Warm, dry. 2+ edema. Multiple toe amputations, purulent discharge Skin:   No rashes, no jaundice Neuro:  AAOx3. No gross focal neurological deficit Psychiatric:  No anxiety, calm, cooperative Data Review: 
I have reviewed all labs, meds, and studies from the last 24 hours: 
 
Recent Results (from the past 24 hour(s)) GLUCOSE, POC Collection Time: 05/27/20 11:16 AM  
Result Value Ref Range Glucose (POC) 182 (H) 65 - 100 mg/dL HEMOGLOBIN Collection Time: 05/27/20  3:53 PM  
Result Value Ref Range HGB 8.0 (L) 13.6 - 17.2 g/dL GLUCOSE, POC Collection Time: 05/27/20  4:07 PM  
Result Value Ref Range Glucose (POC) 191 (H) 65 - 100 mg/dL GLUCOSE, POC Collection Time: 05/27/20  8:33 PM  
Result Value Ref Range Glucose (POC) 280 (H) 65 - 100 mg/dL PROTHROMBIN TIME + INR Collection Time: 05/28/20  5:30 AM  
Result Value Ref Range Prothrombin time 23.4 (H) 12.0 - 14.7 sec INR 2.0    
CBC W/O DIFF Collection Time: 05/28/20  5:30 AM  
Result Value Ref Range WBC 12.2 (H) 4.3 - 11.1 K/uL  
 RBC 2.76 (L) 4.23 - 5.6 M/uL HGB 7.6 (L) 13.6 - 17.2 g/dL HCT 25.0 (L) 41.1 - 50.3 % MCV 90.6 79.6 - 97.8 FL  
 MCH 27.5 26.1 - 32.9 PG  
 MCHC 30.4 (L) 31.4 - 35.0 g/dL  
 RDW 15.7 (H) 11.9 - 14.6 % PLATELET 394 817 - 491 K/uL MPV 10.4 9.4 - 12.3 FL ABSOLUTE NRBC 0.00 0.0 - 0.2 K/uL METABOLIC PANEL, BASIC Collection Time: 05/28/20  5:30 AM  
Result Value Ref Range Sodium 140 136 - 145 mmol/L Potassium 4.0 3.5 - 5.1 mmol/L Chloride 104 98 - 107 mmol/L  
 CO2 28 21 - 32 mmol/L Anion gap 8 7 - 16 mmol/L Glucose 117 (H) 65 - 100 mg/dL BUN 40 (H) 8 - 23 MG/DL Creatinine 1.77 (H) 0.8 - 1.5 MG/DL  
 GFR est AA 49 (L) >60 ml/min/1.73m2 GFR est non-AA 40 (L) >60 ml/min/1.73m2 Calcium 8.6 8.3 - 10.4 MG/DL  
GLUCOSE, POC Collection Time: 05/28/20  6:36 AM  
Result Value Ref Range Glucose (POC) 131 (H) 65 - 100 mg/dL All Micro Results Procedure Component Value Units Date/Time CULTURE, Melissa Mallet STAIN [[id number]]  (Abnormal)  (Susceptibility) Collected:  05/25/20 0200 Order Status:  Completed Specimen:  Wound from Foot Updated:  05/28/20 2092 Special Requests: NO SPECIAL REQUESTS     
  GRAM STAIN NO WBCS SEEN     
   NO EPITHELIAL CELLS SEEN     
      
  MODERATE GRAM POSITIVE COCCI MODERATE GRAM NEGATIVE RODS Culture result:    
  HEAVY ESCHERICHIA COLI ** (EXTENDED SPECTRUM BETA LACTAMASE ) ** ** MULTI-DRUG RESISTANT ORGANISM **  
     
      
  MODERATE ENTEROCOCCUS FAECALIS GROUP D MODERATE STAPHYLOCOCCUS AUREUS SENSITIVITY TO FOLLOW  
     
      
  ESBL CALLED TO AND CORRECTLY REPEATED BY: 
Jennifer Mcintosh RN @ 4398 ON 5/28/20 AK. CULTURE, URINE [[id number]]  (Abnormal) Collected:  05/24/20 1900 Order Status:  Completed Specimen:  Cath Urine Updated:  05/28/20 0741 Special Requests: NO SPECIAL REQUESTS Culture result:    
  6000 COLONIES/mL SUKUMAR ALBICANS  
     
 CULTURE, BLOOD [[id number]]  (Abnormal) Collected:  05/25/20 1543 Order Status:  Completed Specimen:  Blood Updated:  05/28/20 5981 Special Requests: --     
  RIGHT Antecubital 
  
  GRAM STAIN GRAM POSITIVE COCCI AEROBIC BOTTLE POSITIVE RESULTS VERIFIED, PHONED TO AND READ BACK BY FABIANRN @ 2569 5/27/20 MM Culture result: STAPHYLOCOCCUS AUREUS For Susceptibility Refer to Culture Accession [id number] CULTURE, BLOOD [[id number]]  (Abnormal) Collected:  05/24/20 1604 Order Status:  Completed Specimen:  Blood Updated:  05/28/20 1615 Special Requests: RIGHT ANTECUBITAL     
  GRAM STAIN    
  GRAM POS COCCI IN CLUSTERS  
     
      
  AEROBIC AND ANAEROBIC BOTTLES  
     
      
  CRITICAL RESULT NOT CALLED DUE TO PREVIOUS NOTIFICATION OF CRITICAL RESULT WITHIN THE LAST 24 HOURS. Culture result: STAPHYLOCOCCUS AUREUS For Susceptibility Refer to Culture Accession [id number] CULTURE, BLOOD [[id number]]  (Abnormal)  (Susceptibility) Collected:  05/24/20 1604 Order Status:  Completed Specimen:  Blood Updated:  05/28/20 2736 Special Requests: LEFT ANTECUBITAL     
  GRAM STAIN GRAM POSITIVE COCCI AEROBIC AND ANAEROBIC BOTTLES RESULTS VERIFIED, PHONED TO AND READ BACK BY Dalton Minacorry @ 0503 5/25/20 MM Culture result:    
  * METHICILLIN RESISTANT STAPHYLOCOCCUS AUREUS * REFER TO BIOFIRE  PANEL ACC [id number] RESULTS VERIFIED, PHONED TO AND READ BACK BY 
Mary Kate Saez RN ON 5/28/20 @0732, TA 
  
 CULTURE, BLOOD [[id number]] Collected:  05/25/20 1543 Order Status:  Completed Specimen:  Blood Updated:  05/28/20 0205 Special Requests: --     
  RIGHT 
HAND 
  
  GRAM STAIN    
  GRAM POS COCCI IN CLUSTERS  
     
   AEROBIC BOTTLE POSITIVE RESULTS VERIFIED, PHONED TO AND READ BACK BY [facility] 15. AT [address] [place] [address] ON 5.28.20   Culture result:    
  CULTURE IN PROGRESS,FURTHER UPDATES TO FOLLOW REFER TO BIOFIRE PANEL ACC [id number] BLOOD CULTURE ID PANEL [[id number]]  (Abnormal) Collected:  05/24/20 1604 Order Status:  Completed Specimen:  Blood Updated:  05/25/20 6105 Acc. no. from [employer] [id number] Staphylococcus Detected Staphylococcus aureus Detected Comment: RESULTS VERIFIED, PHONED TO AND READ BACK BY 
Shabbir Daily RN @ 2302 ON 5/25/2020 AK. mecA (Methicillin-Resistance Genes) Detected Comment: Presence of mecA is highly indicative of methicillin resistance. The test does not replace traditional culture and susceptibilities RESULTS VERIFIED, PHONED TO AND READ BACK BY 
Shabbir Daily RN @ 9063 ON 05/25/2020 AK. INTERPRETATION Gram positive cocci in clusters, identified by realtime PCR as SUSPECTED MRSA. Comment: Recommend IV vancomycin use, unlikely to be a contaminant. Infectious Diseases Consult recommended in adult patients. THIS TEST DOES NOT REPLACE SENSITIVITY TESTING. Current Meds: 
Current Facility-Administered Medications Medication Dose Route Frequency  furosemide (LASIX) injection 80 mg  80 mg IntraVENous BID  lidocaine (XYLOCAINE) 10 mg/mL (1 %) injection 0.1 mL  0.1 mL SubCUTAneous PRN  
 lactated Ringers infusion  100 mL/hr IntraVENous CONTINUOUS  
 0.9% sodium chloride infusion  25 mL/hr IntraVENous CONTINUOUS  
 sodium chloride (NS) flush 5-40 mL  5-40 mL IntraVENous Q8H  
 sodium chloride (NS) flush 5-40 mL  5-40 mL IntraVENous PRN  
 midazolam (VERSED) injection 2 mg  2 mg IntraVENous ONCE PRN  
 0.9% sodium chloride infusion 250 mL  250 mL IntraVENous PRN  
 vancomycin (VANCOCIN) 1250 mg in  ml infusion  1,250 mg IntraVENous Q24H  ALPRAZolam (XANAX) tablet 0.5 mg  0.5 mg Oral BID PRN  
 famotidine (PEPCID) tablet 20 mg  20 mg Oral DAILY  insulin glargine (LANTUS) injection 25 Units  25 Units SubCUTAneous QHS  NUTRITIONAL SUPPORT ELECTROLYTE PRN ORDERS   Does Not Apply PRN  
 meropenem (MERREM) 500 mg in 0.9% sodium chloride (MBP/ADV) 50 mL  0.5 g IntraVENous Q8H  
 sodium chloride (NS) flush 5-40 mL  5-40 mL IntraVENous Q8H  
 sodium chloride (NS) flush 5-40 mL  5-40 mL IntraVENous PRN  
 acetaminophen (TYLENOL) tablet 650 mg  650 mg Oral Q4H PRN  
 albuterol (PROVENTIL VENTOLIN) nebulizer solution 2.5 mg  2.5 mg Nebulization Q6H RT  
 insulin lispro (HUMALOG) injection   SubCUTAneous AC&HS  
 buPROPion (WELLBUTRIN) tablet 75 mg  75 mg Oral BID  midodrine (PROAMATINE) tablet 10 mg  10 mg Oral TID  OLANZapine (ZyPREXA) tablet 5 mg  5 mg Oral QHS  mirtazapine (REMERON) tablet 15 mg  15 mg Oral QHS  sertraline (ZOLOFT) tablet 100 mg  100 mg Oral DAILY  tamsulosin (FLOMAX) capsule 0.4 mg  0.4 mg Oral DAILY  clindamycin (CLEOCIN) 600mg D5W 50mL IVPB (premix)  600 mg IntraVENous Q8H  
 
 
 Other Studies: 
 
Xr Foot Rt Ap/lat Result Date: 5/24/2020 Right foot CLINICAL INDICATION: Right foot swelling and erythema, recent toe amputations FINDINGS: Two views of the right foot show amputation across the MTP joints of the first through fourth toes. There are small bone fragments in the amputation plane with irregular soft tissue swelling and thickening noted distally at the level the amputation. There is irregularity along the bony cortices of the first, second, and third metatarsal heads. Osteomyelitis cannot be excluded by plain radiography. The fifth toe is unremarkable. IMPRESSION: Soft tissue swelling and irregularity along the amputation plane of the right first through fourth toes. Osteomyelitis is a consideration. Consider further evaluation with MRI to better evaluate for osteomyelitis. Xr Chest [facility] Result Date: 5/24/2020 Portable chest x-ray CLINICAL INDICATION: Dyspnea FINDINGS: Single AP view of the chest compared to a similar exam dated 5/15/2020 shows stable bilateral pulmonary edema pattern. A pacer device is in place. Postcardiac valve replacement changes evident. Small bilateral pleural effusions suspected. IMPRESSION: Acute CHF exacerbation. Assessment and Plan: Active Hospital Problems Diagnosis Date Noted  Pulmonary edema, noncardiac 05/25/2020  Osteomyelitis ([address] 05/24/2020  DEJUAN (acute kidney injury) ([address] 05/24/2020  PAD (peripheral artery disease) ([address] 05/14/2020  Gangrene of toe of both feet ([address] 05/11/2020  Pacemaker 04/27/2020 Dual chamber Biotronik pacemaker placed (1/24/20): AV block post op AVR.  Long term (current) use of anticoagulants 04/27/2020  DNR (do not resuscitate) 02/26/2020  Hypotension 02/18/2020  
 HIT (heparin-induced thrombocytopenia) ([facility]) 01/23/2020  Atrial fibrillation ([address] 01/13/2020 Post op CABG.  Status post aortic valve replacement 01/10/2020 1.  AVR (1/16/20):  25 mm Intuity Jon Gutierres Valve. 2.  Echo (4/12/20):  EF 55-60%. Normal functioning AVR. MG 15. PG 26. Mild MR.  
  
 S/P CABG x 3 01/10/2020  CAD (coronary artery disease) 01/10/2020 CABG (1/16/20):  LIMA to LAD. SVG to OM. SVG to PDA.  Type 2 diabetes with nephropathy ([facility] 08/26/2019  Obesity, morbid ([facility] 10/19/2018 Plan: 
 
Acute on chronic respiratory failure with hypoxia due to pulm edema and sepsis - Pulm edema likely non cardiogenic as EF is normal by Echo. Likely due to renal disease and volume resuscitation in sepsis. - continue increased IV lasix, 
- O2 supplement (home 2-3L NC) and wean as tolerated - Cardiology following - BiPap at night Severe Sepsis with shock due to gangrene MRSA bacteremia, ESBL e coli R foot infection, possible osteomyelitis. S/p washout 5/27 Rt foot infection with possible osteomyelitis - Right foot X-ray: Soft tissue swelling and irregularity along the amputation plane of the right first through fourth toes. - Wound cx (5/24) shows ESBL e coli, MRSA, and enterococcus faecalis - TTE is neg for vegitation. TERRI not ideal at this time due to respiratory status. - ID following: IV vancomycin, IV Meropenem - BCx still positive as of 5/25, repeated 5/28 CAD s/p CABG and PPM  
pAfib S/p AVR (1/2020) HTN // HLD 
- hold anti HTN due to hypotension and sepsis 
- holding coumadin due to supra-theraputic INR Supratherapeutic INR, Anemia 
- INR greater than 9, down to 2.0 today after vitamin K.  
- unclear bleeding source, but Hg dropped from 8 to 6.8. 7.6 after 1u PRBC and after surgery. Cards wants transfused to 9-10 
- coumadin held DEJUAN on CKD Stage 4 (baseline Cr. 1.5 - 1.8 in 5/2020) - SCr improved to baseline on lasix T2DM 
- lantus 25units qHS and ISS 
- diabetic diet BPH: c/w home meds Diet:  DIET DIABETIC CONSISTENT CARB 
DVT PPx: anticoagulated Code: DNR 
 
 
 Signed By: Elvia Robles MD   
 May 28, 2020

## 2020-05-28 NOTE — PROGRESS NOTES
Hourly rounds completed this shift. All needs met. Bed low/locked. No c/o pain. 1 unit of PRBC transfused today. H&H scheduled for 1830. Pt on 5L via NC. Alert/oriented x4. Dressing to right foot clean, dry and intact. Call light in reach. Will continue to monitor pt and give report to oncoming RN. Peripheral IV 05/24/20 Right Antecubital (Active) Site Assessment Clean, dry, & intact 5/28/2020  2:57 PM  
Phlebitis Assessment 0 5/28/2020  2:57 PM  
Infiltration Assessment 0 5/28/2020  2:57 PM  
Dressing Status Clean, dry, & intact 5/28/2020  2:57 PM  
Dressing Type Transparent;Tape 5/28/2020  2:57 PM  
Hub Color/Line Status Green;Flushed;Patent 5/28/2020  2:57 PM  
Alcohol Cap Used No 5/28/2020  1:54 AM  
   
Peripheral IV 05/24/20 Left Forearm (Active) Site Assessment Clean, dry, & intact 5/28/2020  2:57 PM  
Phlebitis Assessment 0 5/28/2020  2:57 PM  
Infiltration Assessment 0 5/28/2020  2:57 PM  
Dressing Status Clean, dry, & intact 5/28/2020  2:57 PM  
Dressing Type Transparent;Tape 5/28/2020  2:57 PM  
Hub Color/Line Status Pink;Flushed;Patent 5/28/2020  2:57 PM  
Alcohol Cap Used No 5/28/2020  1:54 AM

## 2020-05-28 NOTE — PROGRESS NOTES
Hourly rounds completed. All needs met. Pt c/o anxiety and pain. Interventions per MAR. Pt rested a few hours with BiPAP on. Will give the oncoming day shift nurse report.

## 2020-05-28 NOTE — PROGRESS NOTES
[facility] OUTREACH NURSE PROGRESS REPORT SUBJECTIVE: Called to assess patient secondary to transfer from critical care on 5/26. MEWS Score: 1 (05/28/20 6896) Vitals:  
 05/28/20 4503 05/28/20 6746 05/28/20 0845 05/28/20 1059 BP:  146/77  133/71 Pulse:  82  79 Resp:  18  18 Temp:  97.9 °F (36.6 °C)  97.7 °F (36.5 °C) SpO2:  91% 92% 93% Weight: 139.1 kg (306 lb 10.6 oz) Height:      
  
 
LAB DATA: 
 
Recent Labs  
  05/28/20 
0530 05/27/20 
0559  139  
K 4.0 4.1  104 CO2 28 27 AGAP 8 8 * 170* BUN 40* 41* CREA 1.77* 2.00* GFRAA 49* 42* GFRNA 40* 35* CA 8.6 8.1* Recent Labs  
  05/28/20 
0530 05/27/20 
1553 05/27/20 
0559 05/26/20 
1525 WBC 12.2*  --  16.0* 21.2* HGB 7.6* 8.0* 6.8* 7.0*  
HCT 25.0*  --  22.6* 23.3*  
  --  238 243 OBJECTIVE: On arrival to room, I found patient to be resting quietly in bed with primary RN at bedside. Pain Assessment Pain Intensity 1: 0 (05/28/20 0727) Pain Location 1: Shoulder Pain Intervention(s) 1: Medication (see MAR) Patient Stated Pain Goal: 0 
 
  
  
  
  
 
  
  
  
   
 
ASSESSMENT:  Patient is alert, oriented, states he is \"feeling much better today\". SpO2 94% on 5L NC, HR 79. R foot dressing c/d/i. Lasix increased this am by cardiology. Hgb 7. 6. PLAN:  Will continue to follow per critical care outreach protocol.

## 2020-05-28 NOTE — PROGRESS NOTES
Warfarin dosing per pharmacist 
 
Ernestina Paget is a 69 y.o. male. Height: 5' 10\" (177.8 cm)    Weight: 139.1 kg (306 lb 10.6 oz) Indication:  Prevention of thromboembolism (prosthetic heart valves) Goal INR:  2-3 Home dose:  7.5 mg MWF, 5 mg all other days Risk factors or significant drug interactions:  None Other anticoagulants:  N/A *History of HIT Daily Monitoring Date  INR     Warfarin dose HGB              Notes 5/25  7.4  Hold  8.5 2.5 mg PO Vit K admin 5/26  >9.9  Hold  7.0 10 mg PO Vit K admin 5/27  2.4  Hold  6.8        1 unit PRBC ordered 5/28  2  Hold  7.6 Patient with labile INRs since admission, increased to > 9.9 on 5/26. INR down to 2 after 2 doses of Vit K given total this admission. As per hospitalist's note, will continue to hold warfarin due to unclear source of bleeding Please notify pharmacy when to resume warfarin therapy. Thank you, Nuvia Goodson Clinical Pharmacist 
[phone]

## 2020-05-28 NOTE — PROGRESS NOTES
Son Francie calling requesting updates. Wants to know DC plan, notified him hospitalist has not saw the pt today. He is still requiring IV lasix so DC is not probable. Son also requesting Denise Barton vascular NP call son as well as hospitalist. Will pass message to Dr. Lu Moreno. [phone] Spoke with Denise Barton NP-states she will call son today.

## 2020-05-28 NOTE — PROGRESS NOTES
Blood administration initiated. Verified with primary RN [facility]. VSS at initiation. This RN remained at bedside for 30 minutes for observation. Pt BP dropped a but. Primary RN made aware. ROVER visit and accessed. Ok with VS at this time.

## 2020-05-28 NOTE — PROGRESS NOTES
5/28/2020 4:36 PM 
 
Admit Date: 5/24/2020 Admit Diagnosis: Severe sepsis ([facility] [A41.9, R65.20]; Lactic acidosis [E87.2]; Acute CHF (congestive heart failure) ([facility] [I50.9]; Osteomyelitis ([facility] [M86.9]; Hypotension [I95.9] Subjective:  
Sig sob this am. Cannot lay flat. Nurses moved him up in his bed and he became very winded. Crackles throughout Objective:  
  
Visit Vitals /77 (BP 1 Location: Left arm, BP Patient Position: Head of bed elevated (Comment degrees)) Pulse 82 Temp 97.9 °F (36.6 °C) Resp 18 Ht 5' 10\" (1.778 m) Wt 139.1 kg (306 lb 10.6 oz) SpO2 91% BMI 44.00 kg/m² Physical Exam: 
Mac [place], Well Nourished, sob Neck- supple, no JVD 
CV- regular rate and rhythm no MRG Lung- crackles bilaterally Abd- soft, nontender, nondistended Ext- 2+ edema bilaterally. Skin- warm and dry Data Review:  
Recent Labs  
  05/28/20 
0530   
K 4.0  
BUN 40* CREA 1.77* WBC 12.2* HGB 7.6* HCT 25.0*  
 INR 2.0 Assessment/Plan: Active Problems: 
  Obesity, morbid ([facility] (10/19/2018) Type 2 diabetes with nephropathy ([facility] (8/26/2019) CAD (coronary artery disease) (1/10/2020)Stable. Continue current medical therapy. Overview: CABG (1/16/20):  LIMA to LAD. SVG to OM. SVG to PDA. S/P CABG x 3 (1/10/2020) Status post aortic valve replacement (1/10/2020)Stable. Continue current medical therapy. Overview: 1. AVR (1/16/20):  25 mm Intuity Jon Gutierres Valve. 2.  Echo (4/12/20):  EF 55-60%. Normal functioning AVR. MG 15. PG 26. Mild MR. Atrial fibrillation ([facility] (1/13/2020) Overview: Post op CABG. HIT (heparin-induced thrombocytopenia) ([facility] (1/23/2020) Hypotension (2/18/2020) DNR (do not resuscitate) (2/26/2020) Long term (current) use of anticoagulants (4/27/2020) Pacemaker (4/27/2020) Overview: Dual chamber Biotronik pacemaker placed (1/24/20): AV block post op AVR. Gangrene of toe of both feet ([address] (5/11/2020) PAD (peripheral artery disease) ([address] (5/14/2020) Osteomyelitis ([address] (5/24/2020) DEJUAN (acute kidney injury) ([address] (5/24/2020) Pulmonary edema, noncardiac (5/25/2020) GAngere- DOS- doing well- monitor Needs transfusion to hgb 9-10 
 needs diuresis. Increased lasix to 80mg IV for current pul edema

## 2020-05-28 NOTE — WOUND CARE
Patient seen for great toe amputation site dressing placement. Called by Atilio Arnett for vascular surgery. Dr Shireen Yee requesting silver rope packing for newly debrided wound. Patient in bed, new dressing placed as discussed. [email or web address] used for packing. Patient kept falling asleep during assessment and dressing. No questions from patient. Wound clean and viable 1.6x2. 0x1.6 cm. Wound team will follow.

## 2020-05-28 NOTE — PROGRESS NOTES
Palliative Care Progress Note Patient: Tala Steele MRN: [id number]  SSN: [id number] YOB: 1945  Age: 69 y.o. Sex: male Assessment/Plan: Chief Complaint/Interval History: Right foot amputation wound was irrigated on 5/28/20. Principal Diagnosis: · Debility, Unspecified  R53.81 Additional Diagnoses: · Depression  F32.9 · Dyspnea  R06.00 
· Counseling, Encounter for Medical Advice  Z71.9 
· Encounter for Palliative Care  Z51.5 Palliative Performance Scale (PPS) PPS: 40 Medical Decision Making:  
Reviewed and summarized notes from last palliative care visit to present. Discussed case with appropriate providers: OLYA Terrell Reviewed lab and X-ray data from last palliative care visit to present. Mr Vidal Chang is much more upbeat and hopeful than when last seen in the ICU on 5/26/20. He is relieved that no further amputations have so far been required. He continues to take medications for depression. He denies pain, nausea, SOB, and other discomforts. He expresses happiness with the plan to go home with home health. He recognizes that he continues to have several health issues that contribute to his debility. He denies any further questions or concerns at this time. Palliative care services are no longer needed at this time. Thank you for the opportunity to participate in this patients care. Please consult again if further needs arise. Will discuss findings with members of the interdisciplinary team.   
 
  
More than 50% of this 15 minute visit was spent counseling and coordination of care as outlined above. Subjective:  
 
Review of Systems: 
Pt had no complaints; is eager to go home. Objective:  
 
Visit Vitals /77 (BP 1 Location: Left arm, BP Patient Position: Head of bed elevated (Comment degrees)) Pulse 82 Temp 97.9 °F (36.6 °C) Resp 18 Ht 5' 10\" (1.778 m) Wt 306 lb 10.6 oz (139.1 kg) SpO2 92% BMI 44.00 kg/m² Physical Exam: 
 
General:  Cooperative. No acute distress. Eyes:  Conjunctivae/corneas clear Nose: Nares normal. Septum midline. Neck: Supple, symmetrical, trachea midline, no JVD Lungs:   Clear to auscultation bilaterally, unlabored Heart:  Regular rate and rhythm, no murmur Abdomen:   Soft, non-tender, non-distended Extremities: Normal, atraumatic, no cyanosis or edema Skin: R foot covered in gauze. Neurologic: Nonfocal  
Psych: Alert and oriented Signed By: Avril Kramer NP May 28, 2020

## 2020-05-28 NOTE — PROGRESS NOTES
Updated pt's son Adrian Lundborg on the surgical procedure from yesterday. Dr. Savannah Nunez performed the washout yesterday and did not feel that the foot was grossly infected. The wound was assessed this AM and looks well with good granulation tissue. Will need wound care with packing of the foot wound even when d/c'd home will likely need HH.  Will defer antibiotics to ID and primary team.

## 2020-05-28 NOTE — PROGRESS NOTES
Date of Outreach Update: 
Maisha Paula was seen and assessed. MEWS Score: 1 (05/28/20 1059) Vitals:  
 05/28/20 1502 05/28/20 1508 05/28/20 1513 05/28/20 1520 BP: 118/60 115/63 109/56 109/66 Pulse: 80 79 80 80 Resp:      
Temp: 97.8 °F (36.6 °C) SpO2: 95% 94% 96% Weight:      
Height:      
  
 
 Pain Assessment Pain Intensity 1: 0 (05/28/20 0727) Pain Location 1: Shoulder Pain Intervention(s) 1: Medication (see MAR) Patient Stated Pain Goal: 0 Previous Outreach assessment has been reviewed. There have been no significant clinical changes since the completion of the last dated Outreach assessment. Patient receiving 1 unit PRBC, RN at bedside concerned over drop in blood pressure from  to 109. Patient awakens easily to voice, and while did experience a drop in BP over the past hour, BP remains stable. RN to contact outreach RN if any changes in patient condition or if BP drops more. Will continue to follow up per outreach protocol. Signed By:   Diogenes Nichols   May 28, 2020 3:50 PM

## 2020-05-28 NOTE — PROGRESS NOTES
Fred Nielsen Admission Date: 5/24/2020 Daily Progress Note: 5/28/2020 Here with sepsis/ osteomyelitis and bilateral gangrenous toe. Septic shock improved, IV abx given and transferred to the floor. Vascular surgery consulted. CXR consistent with acute pulmonary edema/CHF.  Has preserved LVEF on recent ECHO, hx of pA.fib. Given IV Lasix. Was admitted to ICU on BIPAP, DNR confirmed with severe sepsis, DEJUAN , presumed CHF and concern for osteomyelitis source with bilateral malodorous feet. Continued on Vancomycin, consulted intensivist, ID, cardiology, vascular, wound care. Was recently discharged by Dr. Villegas, vascular surgery service May 15 following right fourth left second and third toe amputations secondary to gangrene. Was discharged home on Midrin and Florinef. He is morbidly obesity with chronic 2-3 L home O2, bilateral carotid stenosis with CAD s/p CABG with AVR 1/10/20 by Dr. Florecita Loja complicated by CKD requiring HD, HIT +, DVTs, wound left thigh and pneumonia, PVD, DM with diabetic nephropathy. Chronic anticoagulation with warfarin for paroxysmal A. Fib. Subjective: Wore BiPAP last night. hgb down to 6.8. Down to 5lpm, States he feels better. Review of Systems Cardiovascular: positive for ESTRELLA Current Facility-Administered Medications Medication Dose Route Frequency  furosemide (LASIX) injection 80 mg  80 mg IntraVENous BID  lidocaine (XYLOCAINE) 10 mg/mL (1 %) injection 0.1 mL  0.1 mL SubCUTAneous PRN  
 sodium chloride (NS) flush 5-40 mL  5-40 mL IntraVENous Q8H  
 sodium chloride (NS) flush 5-40 mL  5-40 mL IntraVENous PRN  
 0.9% sodium chloride infusion 250 mL  250 mL IntraVENous PRN  
 vancomycin (VANCOCIN) 1250 mg in  ml infusion  1,250 mg IntraVENous Q24H  ALPRAZolam (XANAX) tablet 0.5 mg  0.5 mg Oral BID PRN  
 famotidine (PEPCID) tablet 20 mg  20 mg Oral DAILY  insulin glargine (LANTUS) injection 25 Units  25 Units SubCUTAneous QHS  NUTRITIONAL SUPPORT ELECTROLYTE PRN ORDERS   Does Not Apply PRN  
 meropenem (MERREM) 500 mg in 0.9% sodium chloride (MBP/ADV) 50 mL  0.5 g IntraVENous Q8H  
 sodium chloride (NS) flush 5-40 mL  5-40 mL IntraVENous Q8H  
 sodium chloride (NS) flush 5-40 mL  5-40 mL IntraVENous PRN  
 acetaminophen (TYLENOL) tablet 650 mg  650 mg Oral Q4H PRN  
 albuterol (PROVENTIL VENTOLIN) nebulizer solution 2.5 mg  2.5 mg Nebulization Q6H RT  
 insulin lispro (HUMALOG) injection   SubCUTAneous AC&HS  
 buPROPion (WELLBUTRIN) tablet 75 mg  75 mg Oral BID  midodrine (PROAMATINE) tablet 10 mg  10 mg Oral TID  OLANZapine (ZyPREXA) tablet 5 mg  5 mg Oral QHS  mirtazapine (REMERON) tablet 15 mg  15 mg Oral QHS  sertraline (ZOLOFT) tablet 100 mg  100 mg Oral DAILY  tamsulosin (FLOMAX) capsule 0.4 mg  0.4 mg Oral DAILY Objective:  
 
Vitals:  
 05/28/20 3652 05/28/20 6759 05/28/20 0845 05/28/20 1059 BP:  146/77  133/71 Pulse:  82  79 Resp:  18  18 Temp:  97.9 °F (36.6 °C)  97.7 °F (36.5 °C) SpO2:  91% 92% 93% Weight: 306 lb 10.6 oz (139.1 kg) Height:      
 
Intake and Output:  
05/26 1901 - 05/28 0700 In: 396.1 [I.V.:100] Out: 3252 [[id number]] No intake/output data recorded. Intake/Output Summary (Last 24 hours) at 5/28/2020 1310 Last data filed at 5/28/2020 9466 Gross per 24 hour Intake 396.1 ml Output 1352 ml Net -955.9 ml  
 
Physical Exam:          
Constitutional: the patient is obese on 10 lpm, anxious HEENT: Sclera clear, pupils equal, oral mucosa moist 
Lungs: few bilateral posterior crackles Cardiovascular: IRR without M,G,R 
Abd/GI: soft and non-tender; with positive bowel sounds. rounded Ext: warm without cyanosis. There is no lower leg edema. Musculoskeletal: moves all four extremities with equal strength, right toe wound Skin: no jaundice or rashes, toe on right foot with + wound Neuro: no gross neuro deficits, anxious Lines/Drains: PIV,  brown Nutrition: NPO 
 
CHEST XRAY: 5/27 5/24 LAB Recent Labs  
  05/28/20 
0530 05/27/20 
1553 05/27/20 
0559 05/26/20 
1525 WBC 12.2*  --  16.0* 21.2* HGB 7.6* 8.0* 6.8* 7.0*  
HCT 25.0*  --  22.6* 23.3*  
  --  238 243 Recent Labs  
  05/28/20 
0530 05/27/20 
0559 05/26/20 
1640  139  --   
K 4.0 4.1  --   
 104  --   
CO2 28 27  --   
* 170*  --   
BUN 40* 41*  --   
CREA 1.77* 2.00*  --   
INR 2.0 2.4 5.0* Assessment:  
 
Hospital Problems  Date Reviewed: 5/27/2020 Codes Class Noted POA Pulmonary edema, noncardiac ICD-10-CM: J81.1 ICD-9-CM: 890  5/25/2020 Yes Osteomyelitis ([facility] ICD-10-CM: M86.9 ICD-9-CM: 730.20  5/24/2020 Unknown DEJUAN (acute kidney injury) ([facility] ICD-10-CM: N17.9 ICD-9-CM: 584.9  5/24/2020 Yes PAD (peripheral artery disease) (HCC) (Chronic) ICD-10-CM: I73.9 ICD-9-CM: 443.9  5/14/2020 Yes Gangrene of toe of both feet ([facility] ICD-10-CM: T38 
ICD-9-CM: 785.4  5/11/2020 Yes Long term (current) use of anticoagulants (Chronic) ICD-10-CM: Z79.01 
ICD-9-CM: V58.61  4/27/2020 Yes Pacemaker (Chronic) ICD-10-CM: Z95.0 ICD-9-CM: V45.01  4/27/2020 Yes Overview Signed 5/6/2020  9:46 PM by Joe Treviño MD  
  Dual chamber Biotronik pacemaker placed (1/24/20): AV block post op AVR. DNR (do not resuscitate) (Chronic) ICD-10-CM: H61 ICD-9-CM: V49.86  2/26/2020 Yes Hypotension (Chronic) ICD-10-CM: I95.9 ICD-9-CM: 458.9  2/18/2020 Unknown HIT (heparin-induced thrombocytopenia) (HCC) (Chronic) ICD-10-CM: G07.11 ICD-9-CM: 289.84  1/23/2020 Yes Atrial fibrillation (HCC) (Chronic) ICD-10-CM: I48.91 
ICD-9-CM: 427.31  1/13/2020 Yes Overview Signed 5/6/2020  9:46 PM by Joe Treviño MD  
  Post op CABG. CAD (coronary artery disease) (Chronic) ICD-10-CM: I25.10 ICD-9-CM: 414.00  1/10/2020 Yes Overview Signed 5/6/2020  1:19 PM by Ba Lunsford MD  
  CABG (1/16/20):  LIMA to LAD. SVG to OM. SVG to PDA. S/P CABG x 3 (Chronic) ICD-10-CM: Z95.1 ICD-9-CM: V45.81  1/10/2020 Yes Status post aortic valve replacement (Chronic) ICD-10-CM: Z95.2 ICD-9-CM: V43.3  1/10/2020 Yes Overview Addendum 5/6/2020  9:43 PM by Ba Lunsford MD  
  1. AVR (1/16/20):  25 mm Intuity Jon Gutierres Valve. 2.  Echo (4/12/20):  EF 55-60%. Normal functioning AVR. MG 15. PG 26. Mild MR. 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 
ICD-9-CM: 250.40, 583.81  8/26/2019 Yes Obesity, morbid ([address] (Chronic) ICD-10-CM: E66.01 
ICD-9-CM: 278.01  10/19/2018 Yes PLAN:  
 
--needs continued diuresis and management of pulmonary edema --BiPAP nightly. Has at home and asked to resume when he is discharged 
--wean oxygen as tolerated. --MRSA in blood, toe debrided, on vanc\ 
--may need to evaluate left side for pleural effusion with ultrasound, best if responds to diuresis Queenie Reddy MD 
 
More than 50% of time documented was spent in face-to-face contact with the patient and in the care of the patient on the floor/unit where the patient is located.

## 2020-05-28 NOTE — PROGRESS NOTES
[address] [facility] \[facility] [place] [facility]\"" FAX: [phone] Vascular Surgery Progress Note Admit Date: 2020 POD 1 day post surgery Procedure:  Procedure(s): RIGHT FOOT DEBRIDEMENT/ ROOM 608 Subjective:  
 
Patient has no new complaints. Objective:  
 
Blood pressure 146/77, pulse 82, temperature 97.9 °F (36.6 °C), resp. rate 18, height 5' 10\" (1.778 m), weight 306 lb 10.6 oz (139.1 kg), SpO2 92 %. Temp (24hrs), Av.1 °F (36.7 °C), Min:97.5 °F (36.4 °C), Max:98.6 °F (37 °C) Physical Exam:  GENERAL: alert, cooperative, no distress, appears stated age, LUNG:  coarse, diminished throughout, HEART:  regular rate and rhythm, S1, S2 normal, no murmur, click, rub or gallop and EXTREMITIES:  Right Lower Extremity:  Amputation site open, no pus like drainage this AM.  
 
Labs:  
Recent Labs  
  20 
0530 HGB 7.6* WBC 12.2*  
K 4.0  
* Data Review: images and reports reviewed Current Facility-Administered Medications Medication Dose Route Frequency  furosemide (LASIX) injection 80 mg  80 mg IntraVENous BID  lidocaine (XYLOCAINE) 10 mg/mL (1 %) injection 0.1 mL  0.1 mL SubCUTAneous PRN  
 lactated Ringers infusion  100 mL/hr IntraVENous CONTINUOUS  
 0.9% sodium chloride infusion  25 mL/hr IntraVENous CONTINUOUS  
 sodium chloride (NS) flush 5-40 mL  5-40 mL IntraVENous Q8H  
 sodium chloride (NS) flush 5-40 mL  5-40 mL IntraVENous PRN  
 midazolam (VERSED) injection 2 mg  2 mg IntraVENous ONCE PRN  
 0.9% sodium chloride infusion 250 mL  250 mL IntraVENous PRN  
 vancomycin (VANCOCIN) 1250 mg in  ml infusion  1,250 mg IntraVENous Q24H  ALPRAZolam (XANAX) tablet 0.5 mg  0.5 mg Oral BID PRN  
 famotidine (PEPCID) tablet 20 mg  20 mg Oral DAILY  insulin glargine (LANTUS) injection 25 Units  25 Units SubCUTAneous QHS  NUTRITIONAL SUPPORT ELECTROLYTE PRN ORDERS   Does Not Apply PRN  
  meropenem (MERREM) 500 mg in 0.9% sodium chloride (MBP/ADV) 50 mL  0.5 g IntraVENous Q8H  
 sodium chloride (NS) flush 5-40 mL  5-40 mL IntraVENous Q8H  
 sodium chloride (NS) flush 5-40 mL  5-40 mL IntraVENous PRN  
 acetaminophen (TYLENOL) tablet 650 mg  650 mg Oral Q4H PRN  
 albuterol (PROVENTIL VENTOLIN) nebulizer solution 2.5 mg  2.5 mg Nebulization Q6H RT  
 insulin lispro (HUMALOG) injection   SubCUTAneous AC&HS  
 buPROPion (WELLBUTRIN) tablet 75 mg  75 mg Oral BID  midodrine (PROAMATINE) tablet 10 mg  10 mg Oral TID  OLANZapine (ZyPREXA) tablet 5 mg  5 mg Oral QHS  mirtazapine (REMERON) tablet 15 mg  15 mg Oral QHS  sertraline (ZOLOFT) tablet 100 mg  100 mg Oral DAILY  tamsulosin (FLOMAX) capsule 0.4 mg  0.4 mg Oral DAILY  clindamycin (CLEOCIN) 600mg D5W 50mL IVPB (premix)  600 mg IntraVENous Q8H Assessment:  
 
Active Problems: 
  Obesity, morbid ([facility] (10/19/2018) Type 2 diabetes with nephropathy ([facility] (8/26/2019) CAD (coronary artery disease) (1/10/2020) Overview: CABG (1/16/20):  LIMA to LAD. SVG to OM. SVG to PDA. S/P CABG x 3 (1/10/2020) Status post aortic valve replacement (1/10/2020) Overview: 1. AVR (1/16/20):  25 mm Intuity Jon Gutierres Valve. 2.  Echo (4/12/20):  EF 55-60%. Normal functioning AVR. MG 15. PG 26. Mild MR. Atrial fibrillation ([facility] (1/13/2020) Overview: Post op CABG. HIT (heparin-induced thrombocytopenia) ([facility] (1/23/2020) Hypotension (2/18/2020) DNR (do not resuscitate) (2/26/2020) Long term (current) use of anticoagulants (4/27/2020) Pacemaker (4/27/2020) Overview: Dual chamber Biotronik pacemaker placed (1/24/20): AV block post op AVR. Gangrene of toe of both feet ([facility] (5/11/2020) PAD (peripheral artery disease) ([facility] (5/14/2020) Osteomyelitis ([facility] (5/24/2020) DEJUAN (acute kidney injury) ([facility] (5/24/2020) Pulmonary edema, noncardiac (5/25/2020) Plan/Recommendations/Medical Decision Making:  
 
Continue present treatment  
-Will discuss with wound care nurses to washout the right foot wound and pack with either aquacel AG or Iodoform. 
-Keep RLE elevated as much as possible. 
-Antibiotic per primary team. 
-Once discharged home will likely need home health to assist with wound care 
-Will call and discuss with son later today when time permits Signed By: Gutierrez Webster NP May 28, 2020

## 2020-05-28 NOTE — PROGRESS NOTES
Infectious Disease Progress Note Today's Date: 2020 Admit Date: 2020 Impression: · MRSA bacteremia  (524, ): source would appear to be R foot. Local changes, / suggest soft tissue infection and removal HD access seems too early. TTE  not suggestive of endocarditis. · R foot infection s/p earlier amputations for gangrene. Site cx   ESBL E Coli, E Faecalis, staph aureus. Awaiting id. S/p debridement () · PVD with recent amputations gangrenous toes on both right and left foots. X-ray with bony changes on R but given recent surgery unclear this represents OM. If has amputation/BKA then will not be an issue for treatment duration. · He reports PCN allergy as feeling strange after IM PCN injection when he was in [facility] and Keflex and rash. Overall doubt these are major allergies. · History of AVR and pacemaker Plan: · Repeat BCs today. May need to pursue TERRI if BCs persistent as MRSA high propensity to adhere to PPM and prosthetic valve. · Continue Vancomycin/Meropenem for now. Duration TBD. Hard to say if foot has osteomyelitis. Anti-infectives:  
Vancomycin - Clindamycin - Levaquin-->Edd Subjective: Afebrile, doing ok. No issues noted. Updated on ID plans Review of Systems:  A comprehensive review of systems was negative except for that written in the History of Present Illness. Objective:  
 
Visit Vitals /71 (BP 1 Location: Left arm, BP Patient Position: Head of bed elevated (Comment degrees)) Pulse 79 Temp 97.7 °F (36.5 °C) Resp 18 Ht 5' 10\" (1.778 m) Wt 139.1 kg (306 lb 10.6 oz) SpO2 93% BMI 44.00 kg/m² Temp (24hrs), Av.1 °F (36.7 °C), Min:97.5 °F (36.4 °C), Max:98.6 °F (37 °C) No changes from my previous exam  Lines:  kevin Physical Exam: Exam:   
 General: NC/AT, alert and cooperative, looks stated age, in NAD HEENT: eyes closed Neck: supple, symmetric Cardiac: 
 Pulmonary: Abdomen: soft,non-distended Turner : yes Skin: foot wrapped MSK:no enlarged or swollen joints in limbs Extremities: R foot in postop wrap. L GT with dry gangrene--no cellulitis Psychiatric: mood and affect appropriate to situation Data Review: CBC:  
Recent Labs  
  05/28/20 
0530 05/27/20 
1553 05/27/20 
0559 05/26/20 
1525 WBC 12.2*  --  16.0* 21.2*  
RBC 2.76*  --  2.55* 2.59* HGB 7.6* 8.0* 6.8* 7.0*  
HCT 25.0*  --  22.6* 23.3*  
  --  238 243 CMP:  
Recent Labs  
  05/28/20 
0530 05/27/20 
0559 * 170*  139  
K 4.0 4.1  104 CO2 28 27 BUN 40* 41* CREA 1.77* 2.00* CA 8.6 8.1* AGAP 8 8 Liver Enzymes: No results for input(s): TP, ALB, TBIL, AP in the last 72 hours. No lab exists for component: SGOT, GPT, DBIL Microbiology:  
 
All Micro Results Procedure Component Value Units Date/Time CULTURE, Mateus Merl STAIN [[id number]]  (Abnormal)  (Susceptibility) Collected:  05/25/20 0200 Order Status:  Completed Specimen:  Wound from Foot Updated:  05/28/20 4271 Special Requests: NO SPECIAL REQUESTS     
  GRAM STAIN NO WBCS SEEN     
   NO EPITHELIAL CELLS SEEN     
      
  MODERATE GRAM POSITIVE COCCI MODERATE GRAM NEGATIVE RODS Culture result:    
  HEAVY ESCHERICHIA COLI ** (EXTENDED SPECTRUM BETA LACTAMASE ) ** ** MULTI-DRUG RESISTANT ORGANISM **  
     
      
  MODERATE ENTEROCOCCUS FAECALIS GROUP D MODERATE STAPHYLOCOCCUS AUREUS SENSITIVITY TO FOLLOW  
     
      
  ESBL CALLED TO AND CORRECTLY REPEATED BY: 
Debby Orozco RN @ 4560 ON 5/28/20 AK. CULTURE, URINE [[id number]]  (Abnormal) Collected:  05/24/20 1900 Order Status:  Completed Specimen:  Cath Urine Updated:  05/28/20 0741 Special Requests: NO SPECIAL REQUESTS Culture result:    
  6000 COLONIES/mL SUKUMAR ALBICANS  
     
 CULTURE, BLOOD [[id number]]  (Abnormal) Collected:  05/25/20 4539 Order Status:  Completed Specimen:  Blood Updated:  05/28/20 3737 Special Requests: --     
  RIGHT Antecubital 
  
  GRAM STAIN GRAM POSITIVE COCCI AEROBIC BOTTLE POSITIVE RESULTS VERIFIED, PHONED TO AND READ BACK BY OLYA PERALTA @ 7122 5/27/20 MM Culture result: STAPHYLOCOCCUS AUREUS For Susceptibility Refer to Culture Accession [id number] CULTURE, BLOOD [[id number]]  (Abnormal) Collected:  05/24/20 1604 Order Status:  Completed Specimen:  Blood Updated:  05/28/20 2760 Special Requests: RIGHT ANTECUBITAL     
  GRAM STAIN    
  GRAM POS COCCI IN CLUSTERS  
     
      
  AEROBIC AND ANAEROBIC BOTTLES  
     
      
  CRITICAL RESULT NOT CALLED DUE TO PREVIOUS NOTIFICATION OF CRITICAL RESULT WITHIN THE LAST 24 HOURS. Culture result: STAPHYLOCOCCUS AUREUS For Susceptibility Refer to Culture Accession [id number] CULTURE, BLOOD [[id number]]  (Abnormal)  (Susceptibility) Collected:  05/24/20 1604 Order Status:  Completed Specimen:  Blood Updated:  05/28/20 1316 Special Requests: LEFT ANTECUBITAL     
  GRAM STAIN GRAM POSITIVE COCCI AEROBIC AND ANAEROBIC BOTTLES RESULTS VERIFIED, PHONED TO AND READ BACK BY Peri Pretty @ 9733 5/25/20 MM Culture result:    
  * METHICILLIN RESISTANT STAPHYLOCOCCUS AUREUS * REFER TO BIOFIRE  PANEL ACC [id number] RESULTS VERIFIED, PHONED TO AND READ BACK BY 
Compa Campbell RN ON 5/28/20 @0732, TA 
  
 CULTURE, BLOOD [[id number]] Collected:  05/25/20 1543 Order Status:  Completed Specimen:  Blood Updated:  05/28/20 0205 Special Requests: --     
  RIGHT 
HAND 
  
  GRAM STAIN    
  GRAM POS COCCI IN CLUSTERS  
     
   AEROBIC BOTTLE POSITIVE RESULTS VERIFIED, PHONED TO AND READ BACK BY SHYAM Rosales [facility] 15. AT [address] [place] [address] ON 5.28.20   Culture result:    
  CULTURE IN PROGRESS,FURTHER UPDATES TO FOLLOW REFER TO [employer] PANEL ACC [id number] BLOOD CULTURE ID PANEL [[id number]]  (Abnormal) Collected:  05/24/20 1604 Order Status:  Completed Specimen:  Blood Updated:  05/25/20 6424 Acc. no. from [employer] [id number] Staphylococcus Detected Staphylococcus aureus Detected Comment: RESULTS VERIFIED, PHONED TO AND READ BACK BY 
Jayla Morel RN @ 2436 ON 5/25/2020 AK. mecA (Methicillin-Resistance Genes) Detected Comment: Presence of mecA is highly indicative of methicillin resistance. The test does not replace traditional culture and susceptibilities RESULTS VERIFIED, PHONED TO AND READ BACK BY 
Jayla Morel RN @ 4325 ON 05/25/2020 AK. INTERPRETATION Gram positive cocci in clusters, identified by realtime PCR as SUSPECTED MRSA. Comment: Recommend IV vancomycin use, unlikely to be a contaminant. Infectious Diseases Consult recommended in adult patients. THIS TEST DOES NOT REPLACE SENSITIVITY TESTING. Imaging: No new Signed By: Linda Daniels NP May 28, 2020

## 2020-05-28 NOTE — PROGRESS NOTES
Date of Outreach Update: 5/28/2020 Frances Galaviz was seen and assessed. MEWS Score: 1 (05/27/20 1713) Vitals:  
 05/27/20 1925 05/27/20 2000 05/28/20 0154 05/28/20 4339 BP:  128/72 133/74 Pulse:  86 85 Resp:      
Temp:  98.5 °F (36.9 °C) 98.6 °F (37 °C) SpO2: 96% 94% 95% 96% Weight:      
Height:      
  
 
 Pain Assessment Pain Intensity 1: 0 (05/27/20 1940) Patient Stated Pain Goal: 0 Previous Outreach assessment has been reviewed. There have been no significant clinical changes since the completion of the last dated Outreach assessment. R foot dressing c/d/i. Patient resting quietly on BiPap. Will continue to follow up per outreach protocol. Signed By:   Jemal Browning RN   May 28, 2020 4:39 AM

## 2020-05-29 NOTE — PROGRESS NOTES
Pharmacokinetic Consult to Pharmacist 
 
Diane Veronica is a 69 y.o. male being treated for SSTI-  with Vanc. Height: 5' 10\" (177.8 cm)  Weight: 139.1 kg (306 lb 10.6 oz) Lab Results Component Value Date/Time BUN 35 (H) 05/29/2020 05:13 AM  
 Creatinine 1.55 (H) 05/29/2020 05:13 AM  
 WBC 9.3 05/29/2020 05:13 AM  
 Procalcitonin 8.17 05/24/2020 04:04 PM  
 Lactic acid 1.6 05/25/2020 01:08 AM  
  
Estimated Creatinine Clearance: 59.7 mL/min (A) (based on SCr of 1.55 mg/dL (H)). CULTURES: 
5/25 - Blood - 1/4 vials GPC 
5/25 - Wound - GNR and GPC 
5/24 - Urine - NGTD 
5/24 - Blood x 2 - 4/4 vials MRSA Lab Results Component Value Date/Time Vancomycin,trough 25.2 ([facility]) 05/29/2020 09:59 AM  
 Vancomycin, random 12.5 04/10/2020 05:46 AM  
 
 
Day 6 of vancomycin. Goal trough is 15-20mcg/ml. Vancomycin trough supratherapeutic at 25.2, drawn appropriately. Will change the dose to intermittent for now. Repeat a random level in 12 hours. If therapeutic then will schedule a regimen based on calculated kinetics. Will continue to follow patient. Thank you, Elizabeth Foster, PharmD, [facility] Clinical [facility] Specialist 
[phone]

## 2020-05-29 NOTE — PROGRESS NOTES
Hospitalist Note Admit Date:  2020  4:04 PM  
Name:  Kelly Michaud Age:  69 y.o. 
:  1945 MRN:  [id number] PCP:  Vandy Burkitt, MD 
Treatment Team: Attending Provider: Carlos Early MD; Consulting Provider: Aristeo Heredia MD; Consulting Provider: Eladia Hill MD; Consulting Provider: Serg Martins MD; Consulting Provider: Aileen Guerrero MD; Utilization Review: Joni Paige RN; Care Manager: Satya Coronado RN 
 
HPI/Subjective:  
Kelly Michaud is a 69 y.o. male with past medical history significant for DVT, HIT, A. fib, morbid obesity, osteomyelitis, CKD, hemodialysis, hypertension, sleep apnea and type 2 diabetes who was brought in by EMS after heaving a near syncopal episode at home. He was admitted to ICU due to acute respiratory failure, severe sepsis The patient had a recent surgery by Dr Susie Wallace on may 15 for removal of toes due to gangrene. Blood cultures positive for MRSA and foot wound with MRSA, ESBL e coli, and enterococcus. ID and vascular surgery following, washout of foot on : 
Repeat cultures yesterday, await results. O2 requirement better. Cr improving. Hg stable after transfusion yesterday. INR therapeutic. Feeling less short of breath. No fevers. No chills. A little bit improved overall. Objective:  
 
Patient Vitals for the past 24 hrs: 
 Temp Pulse Resp BP SpO2  
20 0759 98.2 °F (36.8 °C) 71 20 123/60 98 % 20 0732     96 % 20 0448 98.6 °F (37 °C) 73  116/67 97 % 20 0159     96 % 20 2305     97 % 20 98.2 °F (36.8 °C) 80 17 145/85 94 % 20 1949     97 % 20 1636 97.8 °F (36.6 °C) 81  130/75 95 % 20 1520  80  109/66   
20 1513  80  109/56 96 % 20 1508  79  115/63 94 % 20 1502 97.8 °F (36.6 °C) 80  118/60 95 % 20 1443 98.1 °F (36.7 °C) 93  139/81 99 % 20 1439     94 % 05/28/20 1059 97.7 °F (36.5 °C) 79 18 133/71 93 % Oxygen Therapy O2 Sat (%): 98 % (05/29/20 0759) Pulse via Oximetry: 72 beats per minute (05/29/20 0732) O2 Device: BIPAP (05/29/20 0732) O2 Flow Rate (L/min): 4 l/min(weaned from 5) (05/28/20 1949) FIO2 (%): 40 % (05/29/20 0732) Estimated body mass index is 44 kg/m² as calculated from the following: 
  Height as of this encounter: 5' 10\" (1.778 m). Weight as of this encounter: 139.1 kg (306 lb 10.6 oz). Intake/Output Summary (Last 24 hours) at 5/29/2020 0945 Last data filed at 5/29/2020 1647 Gross per 24 hour Intake 362 ml Output 3600 ml Net -3238 ml *Note that automatically entered I/Os may not be accurate; dependent on patient compliance with collection and accurate  by techs. Physical Exam:  
General:     Ill but nontoxic Head:   normocephalic, atraumatic Eyes, Ears, nose: PERRL, EOMI. Normal conjunctiva Neck:    supple, non-tender. Trachea midline. Lungs:   Crackles bases Cardiac:   RRR, Normal S1 and S2. Abdomen:   Soft, non distended, nontender, +BS, no guarding/rebound Extremities:   Warm, dry. 2+ edema. Multiple toe amputations, dressing c/d/i Skin:   No rashes, no jaundice Neuro:  AAOx3. No gross focal neurological deficit Psychiatric:  No anxiety, calm, cooperative Data Review: 
I have reviewed all labs, meds, and studies from the last 24 hours: 
 
Recent Results (from the past 24 hour(s)) GLUCOSE, POC Collection Time: 05/28/20 11:03 AM  
Result Value Ref Range Glucose (POC) 210 (H) 65 - 100 mg/dL CULTURE, BLOOD Collection Time: 05/28/20  2:53 PM  
Result Value Ref Range Special Requests: RIGHT 
HAND Culture result: NO GROWTH AFTER 15 HOURS    
CULTURE, BLOOD Collection Time: 05/28/20  3:00 PM  
Result Value Ref Range Special Requests: LEFT 
HAND Culture result: NO GROWTH AFTER 15 HOURS    
GLUCOSE, POC  Collection Time: 05/28/20  3:18 PM  
 Result Value Ref Range Glucose (POC) 204 (H) 65 - 100 mg/dL HGB & HCT Collection Time: 05/28/20  6:43 PM  
Result Value Ref Range HGB 9.5 (L) 13.6 - 17.2 g/dL HCT 31.2 (L) 41.1 - 50.3 % GLUCOSE, POC Collection Time: 05/28/20  8:51 PM  
Result Value Ref Range Glucose (POC) 234 (H) 65 - 100 mg/dL PROTHROMBIN TIME + INR Collection Time: 05/29/20  5:13 AM  
Result Value Ref Range Prothrombin time 21.0 (H) 12.0 - 14.7 sec INR 1.8    
CBC W/O DIFF Collection Time: 05/29/20  5:13 AM  
Result Value Ref Range WBC 9.3 4.3 - 11.1 K/uL  
 RBC 3.46 (L) 4.23 - 5.6 M/uL HGB 9.5 (L) 13.6 - 17.2 g/dL HCT 30.5 (L) 41.1 - 50.3 % MCV 88.2 79.6 - 97.8 FL  
 MCH 27.5 26.1 - 32.9 PG  
 MCHC 31.1 (L) 31.4 - 35.0 g/dL  
 RDW 15.6 (H) 11.9 - 14.6 % PLATELET 147 704 - 610 K/uL MPV 10.5 9.4 - 12.3 FL ABSOLUTE NRBC 0.00 0.0 - 0.2 K/uL METABOLIC PANEL, BASIC Collection Time: 05/29/20  5:13 AM  
Result Value Ref Range Sodium 141 136 - 145 mmol/L Potassium 3.9 3.5 - 5.1 mmol/L Chloride 101 98 - 107 mmol/L  
 CO2 33 (H) 21 - 32 mmol/L Anion gap 7 7 - 16 mmol/L Glucose 111 (H) 65 - 100 mg/dL BUN 35 (H) 8 - 23 MG/DL Creatinine 1.55 (H) 0.8 - 1.5 MG/DL  
 GFR est AA 57 (L) >60 ml/min/1.73m2 GFR est non-AA 47 (L) >60 ml/min/1.73m2 Calcium 8.6 8.3 - 10.4 MG/DL  
GLUCOSE, POC Collection Time: 05/29/20  7:37 AM  
Result Value Ref Range Glucose (POC) 97 65 - 100 mg/dL All Micro Results Procedure Component Value Units Date/Time CULTURE, Xochitl Teto STAIN [[id number]]  (Abnormal)  (Susceptibility) Collected:  05/25/20 0200 Order Status:  Completed Specimen:  Wound from Foot Updated:  05/29/20 6358 Special Requests: NO SPECIAL REQUESTS     
  GRAM STAIN NO WBCS SEEN     
   NO EPITHELIAL CELLS SEEN     
      
  MODERATE GRAM POSITIVE COCCI MODERATE GRAM NEGATIVE RODS Culture result: HEAVY ESCHERICHIA COLI ** (EXTENDED SPECTRUM BETA LACTAMASE ) ** MODERATE ENTEROCOCCUS FAECALIS GROUP D MODERATE * METHICILLIN RESISTANT STAPHYLOCOCCUS AUREUS *  
     
      
  ESBL CALLED TO AND CORRECTLY REPEATED BY: 
Kari Maradiaga RN @ 5600 ON 5/28/20 AK. 
  
      
  ** MULTI-DRUG RESISTANT ORGANISM ** PATIENT IS A KNOWN MRSA SCANT MIXED SKIN BRENNAN ISOLATED  
     
 CULTURE, BLOOD [[id number]]  (Abnormal) Collected:  05/25/20 1543 Order Status:  Completed Specimen:  Blood Updated:  05/29/20 8772 Special Requests: --     
  RIGHT 
HAND 
  
  GRAM STAIN    
  GRAM POS COCCI IN CLUSTERS  
     
   AEROBIC BOTTLE POSITIVE RESULTS VERIFIED, PHONED TO AND READ BACK BY SHYAM Rosales [facility] 15. AT [address] [place] [address] ON 5.28.20   Culture result: STAPHYLOCOCCUS AUREUS     
      
  CULTURE IN PROGRESS,FURTHER UPDATES TO FOLLOW CULTURE, BLOOD [[id number]] Collected:  05/28/20 1500 Order Status:  Completed Specimen:  Blood Updated:  05/29/20 6608 Special Requests: --     
  LEFT 
HAND Culture result: NO GROWTH AFTER 15 HOURS     
 CULTURE, BLOOD [[id number]] Collected:  05/28/20 1453 Order Status:  Completed Specimen:  Blood Updated:  05/29/20 4909 Special Requests: --     
  RIGHT 
HAND Culture result: NO GROWTH AFTER 15 HOURS     
 CULTURE, URINE [[id number]]  (Abnormal) Collected:  05/24/20 1900 Order Status:  Completed Specimen:  Cath Urine Updated:  05/28/20 0741 Special Requests: NO SPECIAL REQUESTS Culture result:    
  6000 COLONIES/mL SUKUMAR ALBICANS  
     
 CULTURE, BLOOD [[id number]]  (Abnormal) Collected:  05/25/20 1543 Order Status:  Completed Specimen:  Blood Updated:  05/28/20 8341 Special Requests: --     
  RIGHT Antecubital 
  
  GRAM STAIN GRAM POSITIVE COCCI AEROBIC BOTTLE POSITIVE   RESULTS VERIFIED, PHONED TO AND READ BACK BY FABIANRN @ 6024 5/27/20 MM Culture result: STAPHYLOCOCCUS AUREUS For Susceptibility Refer to Culture Accession [id number] CULTURE, BLOOD [[id number]]  (Abnormal) Collected:  05/24/20 1604 Order Status:  Completed Specimen:  Blood Updated:  05/28/20 8097 Special Requests: RIGHT ANTECUBITAL     
  GRAM STAIN    
  GRAM POS COCCI IN CLUSTERS  
     
      
  AEROBIC AND ANAEROBIC BOTTLES  
     
      
  CRITICAL RESULT NOT CALLED DUE TO PREVIOUS NOTIFICATION OF CRITICAL RESULT WITHIN THE LAST 24 HOURS. Culture result: STAPHYLOCOCCUS AUREUS For Susceptibility Refer to Culture Accession [id number] CULTURE, BLOOD [[id number]]  (Abnormal)  (Susceptibility) Collected:  05/24/20 1604 Order Status:  Completed Specimen:  Blood Updated:  05/28/20 7489 Special Requests: LEFT ANTECUBITAL     
  GRAM STAIN GRAM POSITIVE COCCI AEROBIC AND ANAEROBIC BOTTLES RESULTS VERIFIED, PHONED TO AND READ BACK BY Jeannette Marquez @ 0503 5/25/20 MM Culture result:    
  * METHICILLIN RESISTANT STAPHYLOCOCCUS AUREUS * REFER TO [employer]  PANEL ACC [id number] RESULTS VERIFIED, PHONED TO AND READ BACK BY 
Naresh Augustin RN ON 5/28/20 @0732,  
  
 BLOOD CULTURE ID PANEL [[id number]]  (Abnormal) Collected:  05/24/20 1604 Order Status:  Completed Specimen:  Blood Updated:  05/25/20 5138 Acc. no. from [employer] [id number] Staphylococcus Detected Staphylococcus aureus Detected Comment: RESULTS VERIFIED, PHONED TO AND READ BACK BY 
Sandy Bardales RN @ 9595 ON 5/25/2020 AK. mecA (Methicillin-Resistance Genes) Detected Comment: Presence of mecA is highly indicative of methicillin resistance. The test does not replace traditional culture and susceptibilities RESULTS VERIFIED, PHONED TO AND READ BACK BY 
Sandy Bardales RN @ 4159 ON 05/25/2020 AK.   INTERPRETATION    
 Gram positive cocci in clusters, identified by realtime PCR as SUSPECTED MRSA. Comment: Recommend IV vancomycin use, unlikely to be a contaminant. Infectious Diseases Consult recommended in adult patients. THIS TEST DOES NOT REPLACE SENSITIVITY TESTING. Current Meds: 
Current Facility-Administered Medications Medication Dose Route Frequency  furosemide (LASIX) injection 80 mg  80 mg IntraVENous BID  
 0.9% sodium chloride infusion 250 mL  250 mL IntraVENous PRN  
 Warfarin Placeholder (ON HOLD)   Other PRN  
 VANCOMYCIN TROUGH REMINDER   Other ONCE  
 lidocaine (XYLOCAINE) 10 mg/mL (1 %) injection 0.1 mL  0.1 mL SubCUTAneous PRN  
 sodium chloride (NS) flush 5-40 mL  5-40 mL IntraVENous Q8H  
 sodium chloride (NS) flush 5-40 mL  5-40 mL IntraVENous PRN  
 0.9% sodium chloride infusion 250 mL  250 mL IntraVENous PRN  
 vancomycin (VANCOCIN) 1250 mg in  ml infusion  1,250 mg IntraVENous Q24H  ALPRAZolam (XANAX) tablet 0.5 mg  0.5 mg Oral BID PRN  
 famotidine (PEPCID) tablet 20 mg  20 mg Oral DAILY  insulin glargine (LANTUS) injection 25 Units  25 Units SubCUTAneous QHS  NUTRITIONAL SUPPORT ELECTROLYTE PRN ORDERS   Does Not Apply PRN  
 meropenem (MERREM) 500 mg in 0.9% sodium chloride (MBP/ADV) 50 mL  0.5 g IntraVENous Q8H  
 sodium chloride (NS) flush 5-40 mL  5-40 mL IntraVENous Q8H  
 sodium chloride (NS) flush 5-40 mL  5-40 mL IntraVENous PRN  
 acetaminophen (TYLENOL) tablet 650 mg  650 mg Oral Q4H PRN  
 albuterol (PROVENTIL VENTOLIN) nebulizer solution 2.5 mg  2.5 mg Nebulization Q6H RT  
 insulin lispro (HUMALOG) injection   SubCUTAneous AC&HS  
 buPROPion (WELLBUTRIN) tablet 75 mg  75 mg Oral BID  midodrine (PROAMATINE) tablet 10 mg  10 mg Oral TID  OLANZapine (ZyPREXA) tablet 5 mg  5 mg Oral QHS  mirtazapine (REMERON) tablet 15 mg  15 mg Oral QHS  sertraline (ZOLOFT) tablet 100 mg  100 mg Oral DAILY  tamsulosin (FLOMAX) capsule 0.4 mg  0.4 mg Oral DAILY Other Studies: 
 
Xr Foot Rt Ap/lat Result Date: 5/24/2020 Right foot CLINICAL INDICATION: Right foot swelling and erythema, recent toe amputations FINDINGS: Two views of the right foot show amputation across the MTP joints of the first through fourth toes. There are small bone fragments in the amputation plane with irregular soft tissue swelling and thickening noted distally at the level the amputation. There is irregularity along the bony cortices of the first, second, and third metatarsal heads. Osteomyelitis cannot be excluded by plain radiography. The fifth toe is unremarkable. IMPRESSION: Soft tissue swelling and irregularity along the amputation plane of the right first through fourth toes. Osteomyelitis is a consideration. Consider further evaluation with MRI to better evaluate for osteomyelitis. Xr Chest [facility] Result Date: 5/24/2020 Portable chest x-ray CLINICAL INDICATION: Dyspnea FINDINGS: Single AP view of the chest compared to a similar exam dated 5/15/2020 shows stable bilateral pulmonary edema pattern. A pacer device is in place. Postcardiac valve replacement changes evident. Small bilateral pleural effusions suspected. IMPRESSION: Acute CHF exacerbation. Assessment and Plan: Active Hospital Problems Diagnosis Date Noted  Pulmonary edema, noncardiac 05/25/2020  Osteomyelitis ([facility] 05/24/2020  DEJUAN (acute kidney injury) ([address] 05/24/2020  PAD (peripheral artery disease) ([address] 05/14/2020  Gangrene of toe of both feet ([address] 05/11/2020  Pacemaker 04/27/2020 Dual chamber Biotronik pacemaker placed (1/24/20): AV block post op AVR.  Long term (current) use of anticoagulants 04/27/2020  DNR (do not resuscitate) 02/26/2020  Hypotension 02/18/2020  
 HIT (heparin-induced thrombocytopenia) ([facility]) 01/23/2020  Atrial fibrillation ([address] 01/13/2020 Post op CABG.  Status post aortic valve replacement 01/10/2020 1. AVR (1/16/20):  25 mm Intuity Jon Gutierres Valve. 2.  Echo (4/12/20):  EF 55-60%. Normal functioning AVR. MG 15. PG 26. Mild MR.  
  
 S/P CABG x 3 01/10/2020  CAD (coronary artery disease) 01/10/2020 CABG (1/16/20):  LIMA to LAD. SVG to OM. SVG to PDA.  Type 2 diabetes with nephropathy ([facility] 08/26/2019  Obesity, morbid ([facility] [address] 10/19/2018 Plan: 
 
Acute on chronic respiratory failure with hypoxia due to pulm edema and sepsis - Pulm edema likely non cardiogenic as EF is normal by Echo. Likely due to renal disease and volume resuscitation in sepsis. - continue IV lasix, decrease back to 40mg bid. oxygenation requirement improving (4L from home 2-3). Bipap at night. - cards and pulm following. Severe Sepsis with shock due to gangrene MRSA bacteremia, ESBL e coli R foot infection, possible osteomyelitis. S/p washout 5/27 Rt foot infection with possible osteomyelitis - Right foot X-ray: Soft tissue swelling and irregularity along the amputation plane of the right first through fourth toes. - Wound cx (5/24) shows ESBL e coli, MRSA, and enterococcus faecalis - TTE is neg for vegitation. - may need repeat or TERRI  
- ID following: IV vancomycin, IV Meropenem - BCx still positive as of 5/25, repeated 5/28 CAD s/p CABG and PPM  
pAfib S/p AVR (1/2020) HTN // HLD 
- hold anti HTN due to hypotension and sepsis 
- coumadin anticoagulation Supratherapeutic INR, Anemia 
- INR greater than 9 initially, now therapeutic after holding and vit K 
- unclear bleeding source, but Hg dropped from 8 to 6.8.  
- Hg now stable 9.5 after transfusion yesterday 
- coumadin held and INR theraputic, but can likely resume tomorrow if Hg stays stable. DEJUAN on CKD Stage 4 (baseline Cr. 1.5 - 1.8 in 5/2020) - SCr improved on lasix T2DM 
- lantus 25units qHS and ISS 
- diabetic diet BPH: c/w home meds Diet:  DIET DIABETIC CONSISTENT CARB 
DVT PPx: anticoagulated Code: DNR Signed By: Rubens Marshall MD   
 May 29, 2020

## 2020-05-29 NOTE — PROGRESS NOTES
Infectious Disease Progress Note Today's Date: 2020 Admit Date: 2020 Impression: · MRSA bacteremia  (524, ): source would appear to be R foot. Local changes, / suggest soft tissue infection and removal HD access seems too early. TTE  not suggestive of endocarditis. · R foot infection s/p earlier amputations for gangrene. Site cx   ESBL E Coli, E Faecalis, MRSA. S/p debridement () · PVD with recent amputations gangrenous toes on both right and left foots. X-ray with bony changes on R but given recent surgery unclear this represents OM. If has amputation/BKA then will not be an issue for treatment duration. · He reports PCN allergy as feeling strange after IM PCN injection when he was in [facility] and Keflex and rash. Overall doubt these are major allergies. · History of AVR and pacemaker Plan:  
· Continue Vancomycin--Duration 6 weeks minimum. Continue Meropenem (change to ertapenem for outpt ease) for 4 weeks to treat ESBL E coli. · Follow repeat BCs. If persistent, he will NEED TERRI. Anti-infectives:  
Vancomycin - Clindamycin - Levaquin-->Edd Subjective:  
Appears to be doing well, no complaints. See pic for wound eval. 
 
Review of Systems:  A comprehensive review of systems was negative except for that written in the History of Present Illness. Objective:  
 
Visit Vitals /60 (BP 1 Location: Left arm, BP Patient Position: At rest) Pulse 71 Temp 98.2 °F (36.8 °C) Resp 20 Ht 5' 10\" (1.778 m) Wt 139.1 kg (306 lb 10.6 oz) SpO2 98% BMI 44.00 kg/m² Temp (24hrs), Av.1 °F (36.7 °C), Min:97.7 °F (36.5 °C), Max:98.6 °F (37 °C) No changes from my previous exam  Lines:  kevin Physical Exam: Exam:   
 General: NC/AT, alert and cooperative, looks stated age, in NAD HEENT: eyes closed Neck: supple, symmetric Cardiac: 
 Pulmonary: 
 Abdomen: soft,non-distended Turner : yes Skin: foot wrapped MSK:no enlarged or swollen joints in limbs Extremities: R foot see below, mild drainage from GT amput site. L GT with dry gangrene--no cellulitis Psychiatric: mood and affect appropriate to situation Data Review: CBC:  
Recent Labs  
  05/29/20 
0513 05/28/20 
1843 05/28/20 
0530  05/27/20 
0559 WBC 9.3  --  12.2*  --  16.0*  
RBC 3.46*  --  2.76*  --  2.55* HGB 9.5* 9.5* 7.6*   < > 6.8* HCT 30.5* 31.2* 25.0*  --  22.6*  
  --  237  --  238  
 < > = values in this interval not displayed. CMP:  
Recent Labs  
  05/29/20 
0513 05/28/20 
0530 05/27/20 
0559 * 117* 170*  140 139  
K 3.9 4.0 4.1  104 104 CO2 33* 28 27 BUN 35* 40* 41* CREA 1.55* 1.77* 2.00* CA 8.6 8.6 8.1* AGAP 7 8 8 Liver Enzymes: No results for input(s): TP, ALB, TBIL, AP in the last 72 hours. No lab exists for component: SGOT, GPT, DBIL Microbiology:  
 
All Micro Results Procedure Component Value Units Date/Time CULTURE, Dulcy Samra STAIN [[id number]]  (Abnormal)  (Susceptibility) Collected:  05/25/20 0200 Order Status:  Completed Specimen:  Wound from Foot Updated:  05/29/20 9771 Special Requests: NO SPECIAL REQUESTS     
  GRAM STAIN NO WBCS SEEN     
   NO EPITHELIAL CELLS SEEN     
      
  MODERATE GRAM POSITIVE COCCI MODERATE GRAM NEGATIVE RODS Culture result:    
  HEAVY ESCHERICHIA COLI ** (EXTENDED SPECTRUM BETA LACTAMASE ) ** MODERATE ENTEROCOCCUS FAECALIS GROUP D MODERATE * METHICILLIN RESISTANT STAPHYLOCOCCUS AUREUS *  
     
      
  ESBL CALLED TO AND CORRECTLY REPEATED BY: 
Zenaida Jimenez RN @ 5992 ON 5/28/20 AK. 
  
      
  ** MULTI-DRUG RESISTANT ORGANISM ** PATIENT IS A KNOWN MRSA SCANT MIXED SKIN BRENNAN ISOLATED  
     
 CULTURE, BLOOD [[id number]]  (Abnormal) Collected:  05/25/20 1544 Order Status:  Completed Specimen:  Blood Updated:  05/29/20 9737 Special Requests: --     
  RIGHT 
HAND 
  
  GRAM STAIN    
  GRAM POS COCCI IN CLUSTERS  
     
   AEROBIC BOTTLE POSITIVE RESULTS VERIFIED, PHONED TO AND READ BACK BY SHYAM Valdes. AT [address] [place] [address] ON 5.28.20   Culture result: STAPHYLOCOCCUS AUREUS     
      
  CULTURE IN PROGRESS,FURTHER UPDATES TO FOLLOW CULTURE, BLOOD [[id number]] Collected:  05/28/20 1500 Order Status:  Completed Specimen:  Blood Updated:  05/29/20 4178 Special Requests: --     
  LEFT 
HAND Culture result: NO GROWTH AFTER 15 HOURS     
 CULTURE, BLOOD [[id number]] Collected:  05/28/20 1453 Order Status:  Completed Specimen:  Blood Updated:  05/29/20 7873 Special Requests: --     
  RIGHT 
HAND Culture result: NO GROWTH AFTER 15 HOURS     
 CULTURE, URINE [[id number]]  (Abnormal) Collected:  05/24/20 1900 Order Status:  Completed Specimen:  Cath Urine Updated:  05/28/20 0741 Special Requests: NO SPECIAL REQUESTS Culture result:    
  6000 COLONIES/mL SUKUMAR ALBICANS  
     
 CULTURE, BLOOD [[id number]]  (Abnormal) Collected:  05/25/20 1543 Order Status:  Completed Specimen:  Blood Updated:  05/28/20 7097 Special Requests: --     
  RIGHT Antecubital 
  
  GRAM STAIN GRAM POSITIVE COCCI AEROBIC BOTTLE POSITIVE RESULTS VERIFIED, PHONED TO AND READ BACK BY OLYA PERALTA @ 7423 5/27/20 MM Culture result: STAPHYLOCOCCUS AUREUS For Susceptibility Refer to Culture Accession [id number] CULTURE, BLOOD [[id number]]  (Abnormal) Collected:  05/24/20 1604 Order Status:  Completed Specimen:  Blood Updated:  05/28/20 1439   Special Requests: RIGHT ANTECUBITAL     
  GRAM STAIN    
  GRAM POS COCCI IN CLUSTERS  
     
      
  AEROBIC AND ANAEROBIC BOTTLES  
     
      
  CRITICAL RESULT NOT CALLED DUE TO PREVIOUS NOTIFICATION OF CRITICAL RESULT WITHIN THE LAST 24 HOURS. Culture result: STAPHYLOCOCCUS AUREUS For Susceptibility Refer to Culture Accession [id number] CULTURE, BLOOD [[id number]]  (Abnormal)  (Susceptibility) Collected:  05/24/20 1604 Order Status:  Completed Specimen:  Blood Updated:  05/28/20 1763 Special Requests: LEFT ANTECUBITAL     
  GRAM STAIN GRAM POSITIVE COCCI AEROBIC AND ANAEROBIC BOTTLES RESULTS VERIFIED, PHONED TO AND READ BACK BY Josie Numbers @ 0502 5/25/20 MM Culture result:    
  * METHICILLIN RESISTANT STAPHYLOCOCCUS AUREUS * REFER TO BIOFIRE  PANEL ACC [id number] RESULTS VERIFIED, PHONED TO AND READ BACK BY 
Adrian Raman RN ON 5/28/20 @0732, TA 
  
 BLOOD CULTURE ID PANEL [[id number]]  (Abnormal) Collected:  05/24/20 1604 Order Status:  Completed Specimen:  Blood Updated:  05/25/20 9939 Acc. no. from [employer] [id number] Staphylococcus Detected Staphylococcus aureus Detected Comment: RESULTS VERIFIED, PHONED TO AND READ BACK BY 
Román Valdivia RN @ 3858 ON 5/25/2020 AK. mecA (Methicillin-Resistance Genes) Detected Comment: Presence of mecA is highly indicative of methicillin resistance. The test does not replace traditional culture and susceptibilities RESULTS VERIFIED, PHONED TO AND READ BACK BY 
Román Valdivia RN @ 5088 ON 05/25/2020 AK. INTERPRETATION Gram positive cocci in clusters, identified by realtime PCR as SUSPECTED MRSA. Comment: Recommend IV vancomycin use, unlikely to be a contaminant. Infectious Diseases Consult recommended in adult patients. THIS TEST DOES NOT REPLACE SENSITIVITY TESTING. Imaging: No new Signed By: Chanda Infante NP May 29, 2020

## 2020-05-29 NOTE — PROGRESS NOTES
Date of Outreach Update: 
Esdras Parker was seen and assessed. Pt resting in bed on Bipap VSS. Previous Outreach assessment has been reviewed. There have been no significant clinical changes since the completion of the last dated Outreach assessment. Will continue to follow up per outreach protocol. Signed By:   Gavin Lux   May 29, 2020 5:18 AM

## 2020-05-29 NOTE — PROGRESS NOTES
[facility] CARDIOLOGY PROGRESS NOTE 
      
 
5/29/2020 4:57 PM 
 
Admit Date: 5/24/2020 Subjective:  
Patient denies any chest pain or dyspnea. Feels better. Edema improved. REnal function stable. ROS: 
Cardiovascular:  As noted above Objective:  
  
Vitals:  
 05/29/20 0900 05/29/20 1224 05/29/20 1424 05/29/20 1556 BP:  (!) 153/91  141/71 Pulse:  89  91 Resp:  20  20 Temp:  98.5 °F (36.9 °C)  98.2 °F (36.8 °C) SpO2: 97% 92% 93% 93% Weight:      
Height:      
 
 
Physical Exam: 
General-No Acute Distress Neck- supple, no JVD 
CV- regular rate and rhythm no MRG Lung- clear bilaterally Abd- soft, nontender, nondistended Ext- trivial edema bilaterally. Skin- warm and dry Data Review:  
Recent Labs  
  05/29/20 
0513 05/28/20 
1843 05/28/20 
0530   --  140  
K 3.9  --  4.0  
BUN 35*  --  40* CREA 1.55*  --  1.77* *  --  117* WBC 9.3  --  12.2* HGB 9.5* 9.5* 7.6* HCT 30.5* 31.2* 25.0*  
  --  237 INR 1.8  --  2.0 No results found for: JENNA Luna Echo (5/25/20): -  Left ventricle: Systolic function was normal. Ejection fraction was estimated in the range of 55 % to 60 %. There were no regional wall motion abnormalities. There was mild concentric hypertrophy.  
-  Left atrium: The atrium was mildly dilated.  
-  Aortic valve: A 25mm Intuity Knowles Gutierres valve was present  
(1/10/20).   
-  Mitral valve: There was mild annular calcification. There was mild 
regurgitation.  
-  Tricuspid valve: There was mild regurgitation. Assessment/Plan:  
 
Principal Problem: 
  Severe sepsis with septic shock ([facility] (5/24/2020) Continue antibiotics. ID following. Active Problems: 
   Obesity, morbid ([facility] (10/19/2018) Noted. Needs weight loss. Type 2 diabetes with nephropathy ([facility] (8/26/2019) Stable. Daily BMP. CAD (coronary artery disease) (1/10/2020) No angina. Status post aortic valve replacement (1/10/2020) Normal function on echo. Atrial fibrillation ([address] (1/13/2020) REstart coumadin. HIT (heparin-induced thrombocytopenia) ([address] (1/23/2020) On coumadin. Avoid heparin. Hypotension (2/18/2020) Monitor with treating sepsis. DNR (do not resuscitate) (2/26/2020) Noted. Acute on chronic respiratory failure with hypoxia ([facility] (3/8/2020) On Lasix. DAily BMP. Pacemaker (4/27/2020) STable.    
 
   
 
 
 
 
Lorena Posey MD 
5/29/2020 4:57 PM

## 2020-05-29 NOTE — PROGRESS NOTES
Warfarin dosing per pharmacist 
 
Mily Morales is a 69 y.o. male. Height: 5' 10\" (177.8 cm)    Weight: 139.1 kg (306 lb 10.6 oz) Indication:  Prevention of thromboembolism (prosthetic heart valves) Goal INR:  2-3 Home dose:  7.5 mg MWF, 5 mg all other days Risk factors or significant drug interactions:  None Other anticoagulants:  N/A *History of HIT Daily Monitoring Date  INR     Warfarin dose HGB              Notes 5/25  7.4  Hold  8.5 2.5 mg PO Vit K admin 5/26  >9.9  Hold  7.0 10 mg PO Vit K admin 5/27  2.4  Hold  6.8        1 unit PRBC transfused 5/28  2  Hold  7.6  1 unit PRBC transfused 5/29  1.8  5 mg  9.5 Patient with labile INRs since admission, increased to > 9.9 on 5/26. INR down to 2 after 2 doses of Vit K given total this admission. Warfarin will be resumed at 5 mg this evening. Pharmacy will continue to follow patient and monitor INR daily. Thank you, Doretha Cardozo, Pharm. D. 
PGY1 Pharmacy Resident [phone]

## 2020-05-29 NOTE — PROGRESS NOTES
CM spoke with patient son Monica Gonzalez, per patient request, to discuss discharge plans. Monica Gonzalez was in agreement with [facility] for patient at provided CM with [facility] as a choice. Monica Gonzalez had many questions regarding patient condition, CM explained those would be questions for the physician. DALIA perfect served Dr. Missy Erickson with request to contact patient son.

## 2020-05-29 NOTE — PROGRESS NOTES
Calrissa Santos Admission Date: 5/24/2020 Daily Progress Note: 5/29/2020 The patient's chart is reviewed and the patient is discussed with the staff. Here with sepsis/ osteomyelitis of right foot. He was in septic shock on admission. He recently underwent amputation of toes right foot per. Dr. Stanton Hayward. Wound culture with ESBL E coli, MRSA and Enterococcus and blood culture with MRSA. ID following. Patient on 6 week course Vanc and Merrem for E Coli.  Required short stay in ICU for Bipap.  
 has developed acute pulmonary edema/CHF with volume resuscitation.  Has preserved LVEF on recent ECHO, hx of pA.fib.  On lasix now with improvement.  
  
He is morbidly obesity with chronic 2-3 L home O2, CPAP for JOAQUIM,  bilateral carotid stenosis with CAD s/p CABG with AVR 1/10/20 by Dr. Venecia Rascon complicated by CKD requiring HD, HIT +, DVTs, wound left thigh and pneumonia, PVD, DM with diabetic nephropathy. Chronic anticoagulation with warfarin for paroxysmal A. Fib. Subjective: \"Have knuckles now! \". Hands less swollen. Less short of breath. Lives with his wife. Has not been out of bed, ambulated. Wearing hospital bipap. Current Facility-Administered Medications Medication Dose Route Frequency  furosemide (LASIX) injection 40 mg  40 mg IntraVENous BID  Vancomycin Intermittent Dosing   Other Rx Dosing/Monitoring  0.9% sodium chloride infusion 250 mL  250 mL IntraVENous PRN  
 Warfarin Placeholder (ON HOLD)   Other PRN  
 lidocaine (XYLOCAINE) 10 mg/mL (1 %) injection 0.1 mL  0.1 mL SubCUTAneous PRN  
 sodium chloride (NS) flush 5-40 mL  5-40 mL IntraVENous Q8H  
 sodium chloride (NS) flush 5-40 mL  5-40 mL IntraVENous PRN  
 0.9% sodium chloride infusion 250 mL  250 mL IntraVENous PRN  
 ALPRAZolam (XANAX) tablet 0.5 mg  0.5 mg Oral BID PRN  
 famotidine (PEPCID) tablet 20 mg  20 mg Oral DAILY  insulin glargine (LANTUS) injection 25 Units  25 Units SubCUTAneous QHS  NUTRITIONAL SUPPORT ELECTROLYTE PRN ORDERS   Does Not Apply PRN  
 meropenem (MERREM) 500 mg in 0.9% sodium chloride (MBP/ADV) 50 mL  0.5 g IntraVENous Q8H  
 sodium chloride (NS) flush 5-40 mL  5-40 mL IntraVENous Q8H  
 sodium chloride (NS) flush 5-40 mL  5-40 mL IntraVENous PRN  
 acetaminophen (TYLENOL) tablet 650 mg  650 mg Oral Q4H PRN  
 albuterol (PROVENTIL VENTOLIN) nebulizer solution 2.5 mg  2.5 mg Nebulization Q6H RT  
 insulin lispro (HUMALOG) injection   SubCUTAneous AC&HS  
 buPROPion (WELLBUTRIN) tablet 75 mg  75 mg Oral BID  midodrine (PROAMATINE) tablet 10 mg  10 mg Oral TID  OLANZapine (ZyPREXA) tablet 5 mg  5 mg Oral QHS  mirtazapine (REMERON) tablet 15 mg  15 mg Oral QHS  sertraline (ZOLOFT) tablet 100 mg  100 mg Oral DAILY  tamsulosin (FLOMAX) capsule 0.4 mg  0.4 mg Oral DAILY Objective:  
 
Vitals:  
 05/29/20 0732 05/29/20 0759 05/29/20 0900 05/29/20 1224 BP:  123/60  (!) 153/91 Pulse:  71  89 Resp:  20  20 Temp:  98.2 °F (36.8 °C)  98.5 °F (36.9 °C) SpO2: 96% 98% 97% 92% Weight:      
Height:      
 
Intake and Output:  
05/27 1901 - 05/29 0700 In: 362 Out: 4175 [DOLDN:3810] 05/29 0701 - 05/29 1900 In: 120 [P.O.:120] Out: 1600 [Urine:1600] Physical Exam:  
Constitutional:  the patient is well developed and in no acute distress HEENT:  Sclera clear, pupils equal, oral mucosa moist 
Lungs: crackles right base. Distant breath sounds on the left. Wearing 5 liter cannula Cardiovascular:  RRR without M,G,R 
Abd/GI: soft and non-tender; with positive bowel sounds. Ext: warm without cyanosis. There is no lower leg edema. Musculoskeletal: moves all four extremities with equal strength Skin:  no jaundice or rashes, right foot wound with dressing Neuro: no gross neuro deficits Musculoskeletal: ? can ambulate - has not been OOB. No deformity Psychiatric: Calm. Review of Systems - Review of Systems Respiratory: Positive for shortness of breath. Cardiovascular: Negative. Gastrointestinal: Negative. Genitourinary:  
     Currently with brown Psychiatric/Behavioral: Positive for depression. Lines: Brown, peripheral IV 
 
CHEST XRAY:  
 
 
LAB Recent Labs  
  05/29/20 
0513 05/28/20 
1843 05/28/20 
0530 05/27/20 
1553 05/27/20 
0559  05/26/20 
1525 WBC 9.3  --  12.2*  --  16.0*  --  21.2* HGB 9.5* 9.5* 7.6* 8.0* 6.8*  --  7.0*  
HCT 30.5* 31.2* 25.0*  --  22.6*  --  23.3*  
  --  237  --  238  --  243 INR 1.8  --  2.0  --  2.4   < > >9.0*  
 < > = values in this interval not displayed. Recent Labs  
  05/29/20 
0513 05/28/20 
0530 05/27/20 
0559  140 139  
K 3.9 4.0 4.1  104 104 CO2 33* 28 27 * 117* 170* BUN 35* 40* 41* CREA 1.55* 1.77* 2.00* Assessment:  (Medical Decision Making) Hospital Problems  Date Reviewed: 5/27/2020 Codes Class Noted POA Pulmonary edema, noncardiac ICD-10-CM: J81.1 ICD-9-CM: 709  5/25/2020 Yes Fluid balance neg 3.2 liters over past 24 hours Osteomyelitis ([facility] ICD-10-CM: M86.9 ICD-9-CM: 730.20  5/24/2020 Unknown Per ID - on vanc/merrem DEJUAN (acute kidney injury) ([facility] ICD-10-CM: N17.9 ICD-9-CM: 584.9  5/24/2020 Yes  
 improved PAD (peripheral artery disease) (HCC) (Chronic) ICD-10-CM: I73.9 ICD-9-CM: 443.9  5/14/2020 Yes S/p amputation right foot Gangrene of toe of both feet ([facility] ICD-10-CM: Z74 
ICD-9-CM: 785.4  5/11/2020 Yes As above Long term (current) use of anticoagulants (Chronic) ICD-10-CM: Z79.01 
ICD-9-CM: V58.61  4/27/2020 Yes INR down to 1.8 Pacemaker (Chronic) ICD-10-CM: Z95.0 ICD-9-CM: V45.01  4/27/2020 Yes Overview Signed 5/6/2020  9:46 PM by Chet Wong MD  
  Dual chamber Biotronik pacemaker placed (1/24/20): AV block post op AVR.   
  
  
   
 DNR (do not resuscitate) (Chronic) ICD-10-CM: I95 
 ICD-9-CM: V49.86  2/26/2020 Yes  
 noted Hypotension (Chronic) ICD-10-CM: I95.9 ICD-9-CM: 458.9  2/18/2020 Unknown On Midodrine - systolic BP 182U to 289T HIT (heparin-induced thrombocytopenia) (HCC) (Chronic) ICD-10-CM: P29.01 ICD-9-CM: 289.84  1/23/2020 Yes History of - post op CABG Atrial fibrillation (HCC) (Chronic) ICD-10-CM: I48.91 
ICD-9-CM: 427.31  1/13/2020 Yes Overview Signed 5/6/2020  9:46 PM by Millie Ridley MD  
  Post op CABG. Sinus tach per telemetry CAD (coronary artery disease) (Chronic) ICD-10-CM: I25.10 ICD-9-CM: 414.00  1/10/2020 Yes Overview Signed 5/6/2020  1:19 PM by Millie Ridley MD  
  CABG (1/16/20):  LIMA to LAD. SVG to OM. SVG to PDA. S/P CABG x 3 (Chronic) ICD-10-CM: Z95.1 ICD-9-CM: V45.81  1/10/2020 Yes Status post aortic valve replacement (Chronic) ICD-10-CM: Z95.2 ICD-9-CM: V43.3  1/10/2020 Yes Overview Addendum 5/6/2020  9:43 PM by Millie Ridley MD  
  1. AVR (1/16/20):  25 mm Intuity Jon Gutierres Valve. 2.  Echo (4/12/20):  EF 55-60%. Normal functioning AVR. MG 15. PG 26. Mild MR. 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 
ICD-9-CM: 250.40, 583.81  8/26/2019 Yes Per hospitalist - BS low 100s Obesity, morbid ([address] (Chronic) ICD-10-CM: E66.01 
ICD-9-CM: 278.01  10/19/2018 Yes Using hospital Bipap for JOAQUIM Plan:  (Medical Decision Making) 1. Continue IV lasix - 40 mg BID. Fluid balance neg 3.2 liters over past 24 hours. Labs reviewed. Weight up 6 lbs during admission. Monitor fluid balance, wt, labs, oxygen needs, blood pressure 2. Dullness/left pleural effusion - oxygen down to 5 liters with diuresis. Consider US left chest to further assess effusion. INR down to 1.8 with coumadin on hold 3. PT consult. Checked with vascular surgery and it is OK for patient to ambulate on the heel of right foot. He lives with his wife - ?  Need for rehab 4. 6 week course Vanc per ID - repeat blood cultures from yesterday pending. Also on Merrem for foot wound - where will he go from here? Has been to [facility] before 5. Leave [place] for now - still on IV lasix 6. Using hospital Bipap with sleep - has home machine Keily Willson NP More than 50% of time documented was spent face-to-face contact with the patient and in the care of the patient on the floor/unit where the patient is located. Lungs:  Clear, 4L NC Heart:  RRR with no Murmur/Rubs/Gallops Additional Comments:  Improving with diuresis. BIPAP with sleep, has one at home and states he will start using again when discharged. No plan for thoracentesis at this time. Would aggressively control volume. Will see Monday. Call with questions over weekend. I have spoken with and examined the patient. I agree with the above assessment and plan as documented.  
 
Dereck Rosales MD

## 2020-05-29 NOTE — PROGRESS NOTES
[facility] OUTREACH NURSE PROGRESS REPORT SUBJECTIVE: Called to assess patient secondary to outreach protocol. MEWS Score: 1 (05/28/20 1059) Vitals:  
 05/28/20 1520 05/28/20 1636 05/28/20 1949 05/28/20 2016 BP: 109/66 130/75  145/85 Pulse: 80 81  80 Resp:    17 Temp:  97.8 °F (36.6 °C)  98.2 °F (36.8 °C) SpO2:  95% 97% 94% Weight:      
Height:      
  
 
LAB DATA: 
 
Recent Labs  
  05/28/20 
0530 05/27/20 
0559  139  
K 4.0 4.1  104 CO2 28 27 AGAP 8 8 * 170* BUN 40* 41* CREA 1.77* 2.00* GFRAA 49* 42* GFRNA 40* 35* CA 8.6 8.1* Recent Labs  
  05/28/20 
1843 05/28/20 
0530 05/27/20 
1553 05/27/20 
0559 05/26/20 
1525 WBC  --  12.2*  --  16.0* 21.2* HGB 9.5* 7.6* 8.0* 6.8* 7.0*  
HCT 31.2* 25.0*  --  22.6* 23.3*  
PLT  --  237  --  238 243 OBJECTIVE: On arrival to room, I found patient to be resting in bed watching TV. Pain Assessment Pain Intensity 1: 0 (05/28/20 0727) Pain Location 1: Shoulder Pain Intervention(s) 1: Medication (see MAR) Patient Stated Pain Goal: 0 
 
  
  
  
  
 
  
  
  
   
 
ASSESSMENT:  Pt is axox4. Pt is very anxious and requesting xanax. Pt is anxious about dying from MRSA in his foot. Pulses palpable in right foot. Site is c/d/i. HR= 73 O2=93%. PLAN:  Will continue to monitor.

## 2020-05-29 NOTE — PROGRESS NOTES
Hourly rounds completed this shift. All needs met. Bed low/locked. No c/o pain. Call light in reach. Will continue to monitor pt and give report to oncoming RN. Peripheral IV 05/24/20 Right Antecubital (Active) Site Assessment Clean, dry, & intact 5/29/2020  2:31 PM  
Phlebitis Assessment 0 5/29/2020  2:31 PM  
Infiltration Assessment 0 5/29/2020  2:31 PM  
Dressing Status Clean, dry, & intact 5/29/2020  2:31 PM  
Dressing Type Transparent;Tape 5/29/2020  2:31 PM  
Hub Color/Line Status Green; Infusing;Patent 5/29/2020  2:31 PM  
Alcohol Cap Used No 5/28/2020  1:54 AM  
   
Peripheral IV 05/24/20 Left Forearm (Active) Site Assessment Clean, dry, & intact 5/29/2020  2:31 PM  
Phlebitis Assessment 0 5/29/2020  2:31 PM  
Infiltration Assessment 0 5/29/2020  2:31 PM  
Dressing Status Clean, dry, & intact 5/29/2020  2:31 PM  
Dressing Type Transparent;Tape 5/29/2020  2:31 PM  
Hub Color/Line Status Pink;Flushed;Patent 5/29/2020  2:31 PM  
Alcohol Cap Used No 5/28/2020  1:54 AM

## 2020-05-29 NOTE — PROGRESS NOTES
[address] [facility] [address] FAX: [phone] Vascular Surgery Progress Note Admit Date: 2020 POD 2 day post surgery Procedure:  Procedure(s): RIGHT FOOT DEBRIDEMENT/ ROOM 608 Subjective:  
 
Patient has no new complaints. Seems to breathing better. Objective:  
 
Blood pressure 123/60, pulse 71, temperature 98.2 °F (36.8 °C), resp. rate 20, height 5' 10\" (1.778 m), weight 306 lb 10.6 oz (139.1 kg), SpO2 98 %. Temp (24hrs), Av.1 °F (36.7 °C), Min:97.8 °F (36.6 °C), Max:98.6 °F (37 °C) Physical Exam:  GENERAL: alert, cooperative, no distress, appears stated age, LUNG:  coarse, diminished throughout, HEART:  regular rate and rhythm, S1, S2 normal, no murmur, click, rub or gallop and EXTREMITIES:  Right Lower Extremity:  RLE dressing place Labs:  
Recent Labs  
  20 
2969 HGB 9.5* WBC 9.3  
K 3.9 * Data Review: images and reports reviewed Current Facility-Administered Medications Medication Dose Route Frequency  furosemide (LASIX) injection 40 mg  40 mg IntraVENous BID  Vancomycin Intermittent Dosing   Other Rx Dosing/Monitoring  0.9% sodium chloride infusion 250 mL  250 mL IntraVENous PRN  
 Warfarin Placeholder (ON HOLD)   Other PRN  
 lidocaine (XYLOCAINE) 10 mg/mL (1 %) injection 0.1 mL  0.1 mL SubCUTAneous PRN  
 sodium chloride (NS) flush 5-40 mL  5-40 mL IntraVENous Q8H  
 sodium chloride (NS) flush 5-40 mL  5-40 mL IntraVENous PRN  
 0.9% sodium chloride infusion 250 mL  250 mL IntraVENous PRN  
 ALPRAZolam (XANAX) tablet 0.5 mg  0.5 mg Oral BID PRN  
 famotidine (PEPCID) tablet 20 mg  20 mg Oral DAILY  insulin glargine (LANTUS) injection 25 Units  25 Units SubCUTAneous QHS  NUTRITIONAL SUPPORT ELECTROLYTE PRN ORDERS   Does Not Apply PRN  
 meropenem (MERREM) 500 mg in 0.9% sodium chloride (MBP/ADV) 50 mL  0.5 g IntraVENous Q8H  
  sodium chloride (NS) flush 5-40 mL  5-40 mL IntraVENous Q8H  
 sodium chloride (NS) flush 5-40 mL  5-40 mL IntraVENous PRN  
 acetaminophen (TYLENOL) tablet 650 mg  650 mg Oral Q4H PRN  
 albuterol (PROVENTIL VENTOLIN) nebulizer solution 2.5 mg  2.5 mg Nebulization Q6H RT  
 insulin lispro (HUMALOG) injection   SubCUTAneous AC&HS  
 buPROPion (WELLBUTRIN) tablet 75 mg  75 mg Oral BID  midodrine (PROAMATINE) tablet 10 mg  10 mg Oral TID  OLANZapine (ZyPREXA) tablet 5 mg  5 mg Oral QHS  mirtazapine (REMERON) tablet 15 mg  15 mg Oral QHS  sertraline (ZOLOFT) tablet 100 mg  100 mg Oral DAILY  tamsulosin (FLOMAX) capsule 0.4 mg  0.4 mg Oral DAILY Assessment:  
 
Active Problems: 
  Obesity, morbid ([facility] (10/19/2018) [facility] (8/26/2019) CAD (coronary artery disease) (1/10/2020) Overview: CABG (1/16/20):  LIMA to LAD. SVG to OM. SVG to PDA. S/P CABG x 3 (1/10/2020) Status post aortic valve replacement (1/10/2020) Overview: 1. AVR (1/16/20):  25 mm Intuity Jon Gutierres Valve. 2.  Echo (4/12/20):  EF 55-60%. Normal functioning AVR. MG 15. PG 26. Mild MR. Atrial fibrillation ([facility] (1/13/2020) Overview: Post op CABG. HIT (heparin-induced thrombocytopenia) ([facility] (1/23/2020) Hypotension (2/18/2020) DNR (do not resuscitate) (2/26/2020) Long term (current) use of anticoagulants (4/27/2020) Pacemaker (4/27/2020) Overview: Dual chamber Biotronik pacemaker placed (1/24/20): AV block post op AVR. Gangrene of toe of both feet ([facility] (5/11/2020) PAD (peripheral artery disease) ([facility] (5/14/2020) Osteomyelitis ([facility] (5/24/2020) DEJUAN (acute kidney injury) ([facility] (5/24/2020) Pulmonary edema, noncardiac (5/25/2020) Plan/Recommendations/Medical Decision Making:  
 
Continue present treatment  
-Keep RLE elevated as much as possible -Continue with daily dressing changes by wound care with silver rope  
-Can f/u with vascular surgery as an outpatient. Signed By: Abiodun Flores NP May 29, 2020

## 2020-05-29 NOTE — PROGRESS NOTES
05/28/20 2305 Oxygen Therapy O2 Sat (%) 97 % Pulse via Oximetry 81 beats per minute O2 Device BIPAP  
FIO2 (%) 40 % Respiratory Respiratory (WDL) X Respiratory Pattern Tachypneic Breath Sounds Bilateral Diminished CPAP/BIPAP  
CPAP/BIPAP Start/Stop On Device Mode BIPAP  
$$ Bipap Daily Yes Mask Type and Size Full face; Medium Skin Condition Intact PIP Observed 17 cm H20 IPAP (cm H2O) 16 cm H2O  
EPAP (cm H2O) 8 cm H2O Inspiratory Time (sec) 1 seconds Vt Spont (ml) 441 ml Ve Observed (l/min) 11.5 l/min Backup Rate 12 Total RR (Spontaneous) 25 breaths per minute Insp Rise Time (sec) 3 Leak (Estimated) 13 L/min  
Pt's Home Machine No  
Biomedical Check Performed Yes Settings Verified Yes Alarm Settings High Pressure 40 Low Pressure 2 Apnea 20 Low Ve 4 High Rate 50 Low Rate 8 Pulmonary Toilet Pulmonary Toilet H. O.B elevated

## 2020-05-30 NOTE — PROGRESS NOTES
Pt complained of constipation stating that he has not had a bowel movement in five days. Pt sat on the bedpan and was unable to have a bowel movement. Physician paged for PRN medication.

## 2020-05-30 NOTE — PROGRESS NOTES
Hospitalist Note Admit Date:  2020  4:04 PM  
Name:  Frances Galaviz Age:  69 y.o. 
:  1945 MRN:  [id number] PCP:  Elizabeth Desouza MD 
Treatment Team: Attending Provider: Karie Henley MD; Consulting Provider: Jessica Perez MD; Consulting Provider: Janessa Davison MD; Consulting Provider: Scarlet Nagy MD; Consulting Provider: Isabel Brock MD; Utilization Review: Merlinda Ocean, RN; Care Manager: Terris Ormond, RN; Primary Nurse: Johny Alonzo; Charge Nurse: Marc Castro RN; Physical Therapist: Manav Boyer, PT, DPT 
 
HPI/Subjective:  
Frances Galaviz is a 69 y.o. male with past medical history significant for DVT, HIT, A. fib, morbid obesity, osteomyelitis, CKD, hemodialysis, hypertension, sleep apnea and type 2 diabetes who was brought in by EMS after heaving a near syncopal episode at home. He was admitted to ICU due to acute respiratory failure, severe sepsis The patient had a recent surgery by Dr Iliana Prakash on may 15 for removal of toes due to gangrene. Blood cultures positive for MRSA and foot wound with MRSA, ESBL e coli, and enterococcus. ID and vascular surgery following, washout of foot on : 
Repeat cultures  NGTD Cr improving. Hg stable after transfusion yesterday. INR 1.2 this morning. 4.8L negative yesterday, remains on supplemental o2 Feeling much stronger Objective:  
 
Patient Vitals for the past 24 hrs: 
 Temp Pulse Resp BP SpO2  
20 0816 98.4 °F (36.9 °C) 87 20 141/76 92 % 20 0720     91 % 20 0513  79 22 144/79 97 % 20 0228     95 % 20 2342 97.7 °F (36.5 °C) 87 20 164/71 94 % 20 2204     95 % 20 2046     93 % 20 1931 99.1 °F (37.3 °C) 89 20 153/80 91 % 20 1556 98.2 °F (36.8 °C) 91 20 141/71 93 % 20 1424     93 % 20 1224 98.5 °F (36.9 °C) 89 20 (!) 153/91 92 % Oxygen Therapy O2 Sat (%): 92 % (05/30/20 0816) Pulse via Oximetry: 87 beats per minute (05/30/20 0720) O2 Device: Hi flow nasal cannula (05/30/20 0720) O2 Flow Rate (L/min): 6 l/min (05/30/20 0720) FIO2 (%): 40 % (05/30/20 0228) Estimated body mass index is 35.15 kg/m² as calculated from the following: 
  Height as of this encounter: 5' 10\" (1.778 m). Weight as of this encounter: 111.1 kg (245 lb). Intake/Output Summary (Last 24 hours) at 5/30/2020 1114 Last data filed at 5/30/2020 6716 Gross per 24 hour Intake 220 ml Output 3450 ml Net -3230 ml  
   
*Note that automatically entered I/Os may not be accurate; dependent on patient compliance with collection and accurate  by techs. Physical Exam:  
General:     No distress Head:   normocephalic, atraumatic Eyes, Ears, nose: PERRL, EOMI. Normal conjunctiva Neck:    supple, non-tender. Trachea midline. Lungs:   Crackles bases Cardiac:   RRR, Normal S1 and S2. Abdomen:   Soft, non distended, nontender, +BS, no guarding/rebound Extremities:   Warm, dry. 2+ edema. Multiple toe amputations, dressing c/d/i Skin:   No rashes, no jaundice Neuro:  AAOx3. No gross focal neurological deficit Psychiatric:  No anxiety, calm, cooperative Data Review: 
I have reviewed all labs, meds, and studies from the last 24 hours: 
 
Recent Results (from the past 24 hour(s)) GLUCOSE, POC Collection Time: 05/29/20  4:54 PM  
Result Value Ref Range Glucose (POC) 201 (H) 65 - 100 mg/dL GLUCOSE, POC Collection Time: 05/29/20  8:48 PM  
Result Value Ref Range Glucose (POC) 174 (H) 65 - 100 mg/dL Michaelle Shrestha Collection Time: 05/29/20 10:03 PM  
Result Value Ref Range Vancomycin, random 21.0 UG/ML  
PROTHROMBIN TIME + INR Collection Time: 05/30/20  6:45 AM  
Result Value Ref Range Prothrombin time 15.7 (H) 12.0 - 14.7 sec INR 1.2    
CBC W/O DIFF Collection Time: 05/30/20  6:45 AM  
Result Value Ref Range WBC 12.9 (H) 4.3 - 11.1 K/uL  
 RBC 3.64 (L) 4.23 - 5.6 M/uL  
 HGB 10.2 (L) 13.6 - 17.2 g/dL HCT 32.0 (L) 41.1 - 50.3 % MCV 87.9 79.6 - 97.8 FL  
 MCH 28.0 26.1 - 32.9 PG  
 MCHC 31.9 31.4 - 35.0 g/dL  
 RDW 15.7 (H) 11.9 - 14.6 % PLATELET 952 447 - 069 K/uL MPV 10.2 9.4 - 12.3 FL ABSOLUTE NRBC 0.00 0.0 - 0.2 K/uL METABOLIC PANEL, BASIC Collection Time: 05/30/20  6:45 AM  
Result Value Ref Range Sodium 138 136 - 145 mmol/L Potassium 3.9 3.5 - 5.1 mmol/L Chloride 98 98 - 107 mmol/L  
 CO2 35 (H) 21 - 32 mmol/L Anion gap 5 (L) 7 - 16 mmol/L Glucose 113 (H) 65 - 100 mg/dL BUN 36 (H) 8 - 23 MG/DL Creatinine 1.48 0.8 - 1.5 MG/DL  
 GFR est AA 60 (L) >60 ml/min/1.73m2 GFR est non-AA 50 (L) >60 ml/min/1.73m2 Calcium 8.3 8.3 - 10.4 MG/DL  
GLUCOSE, POC Collection Time: 05/30/20  7:01 AM  
Result Value Ref Range Glucose (POC) 112 (H) 65 - 100 mg/dL All Micro Results Procedure Component Value Units Date/Time CULTURE, BLOOD [[id number]]  (Abnormal) Collected:  05/25/20 1543 Order Status:  Completed Specimen:  Blood Updated:  05/30/20 6054 Special Requests: --     
  RIGHT 
HAND 
  
  GRAM STAIN    
  GRAM POS COCCI IN CLUSTERS  
     
   AEROBIC BOTTLE POSITIVE RESULTS VERIFIED, PHONED TO AND READ BACK BY SHYAM Valdes. AT [facility] ON 5.28.20   Culture result: STAPHYLOCOCCUS AUREUS     
   SENSITIVITY TO FOLLOW CULTURE, BLOOD [[id number]] Collected:  05/28/20 1453 Order Status:  Completed Specimen:  Blood Updated:  05/30/20 8050 Special Requests: --     
  RIGHT 
HAND Culture result: NO GROWTH 2 DAYS     
 CULTURE, BLOOD [[id number]] Collected:  05/28/20 1500 Order Status:  Completed Specimen:  Blood Updated:  05/30/20 7967 Special Requests: --     
  LEFT 
HAND Culture result: NO GROWTH 2 DAYS     
 CULTURE, WOUND Raford Payer STAIN [[id number]]  (Abnormal)  (Susceptibility) Collected:  05/25/20 0200 Order Status:  Completed Specimen:  Wound from Foot Updated:  05/29/20 9605 Special Requests: NO SPECIAL REQUESTS     
  GRAM STAIN NO WBCS SEEN     
   NO EPITHELIAL CELLS SEEN     
      
  MODERATE GRAM POSITIVE COCCI MODERATE GRAM NEGATIVE RODS Culture result:    
  HEAVY ESCHERICHIA COLI ** (EXTENDED SPECTRUM BETA LACTAMASE ) ** MODERATE ENTEROCOCCUS FAECALIS GROUP D MODERATE * METHICILLIN RESISTANT STAPHYLOCOCCUS AUREUS *  
     
      
  ESBL CALLED TO AND CORRECTLY REPEATED BY: 
Shahab Hall RN @ 2854 ON 5/28/20 AK. 
  
      
  ** MULTI-DRUG RESISTANT ORGANISM ** PATIENT IS A KNOWN MRSA SCANT MIXED SKIN BRENNAN ISOLATED  
     
 CULTURE, URINE [[id number]]  (Abnormal) Collected:  05/24/20 1900 Order Status:  Completed Specimen:  Cath Urine Updated:  05/28/20 0741 Special Requests: NO SPECIAL REQUESTS Culture result:    
  6000 COLONIES/mL SUKUMAR ALBICANS  
     
 CULTURE, BLOOD [[id number]]  (Abnormal) Collected:  05/25/20 1543 Order Status:  Completed Specimen:  Blood Updated:  05/28/20 0310 Special Requests: --     
  RIGHT Antecubital 
  
  GRAM STAIN GRAM POSITIVE COCCI AEROBIC BOTTLE POSITIVE RESULTS VERIFIED, PHONED TO AND READ BACK BY OLYA PERALTA @ 2612 5/27/20 MM Culture result: STAPHYLOCOCCUS AUREUS For Susceptibility Refer to Culture Accession [id number] CULTURE, BLOOD [[id number]]  (Abnormal) Collected:  05/24/20 1604 Order Status:  Completed Specimen:  Blood Updated:  05/28/20 7317 Special Requests: RIGHT ANTECUBITAL     
  GRAM STAIN    
  GRAM POS COCCI IN CLUSTERS  
     
      
  AEROBIC AND ANAEROBIC BOTTLES  
     
      
  CRITICAL RESULT NOT CALLED DUE TO PREVIOUS NOTIFICATION OF CRITICAL RESULT WITHIN THE LAST 24 HOURS.   
     
  Culture result: STAPHYLOCOCCUS AUREUS     
      
 For Susceptibility Refer to Culture Accession [id number] CULTURE, BLOOD [[id number]]  (Abnormal)  (Susceptibility) Collected:  05/24/20 1604 Order Status:  Completed Specimen:  Blood Updated:  05/28/20 6602 Special Requests: LEFT ANTECUBITAL     
  GRAM STAIN GRAM POSITIVE COCCI AEROBIC AND ANAEROBIC BOTTLES RESULTS VERIFIED, PHONED TO AND READ BACK BY Jeannette Marquez @ 0503 5/25/20 MM Culture result:    
  * METHICILLIN RESISTANT STAPHYLOCOCCUS AUREUS * REFER TO BIOFIRE  PANEL ACC [id number] RESULTS VERIFIED, PHONED TO AND READ BACK BY 
Naresh Augustin RN ON 5/28/20 @0732, TA 
  
 BLOOD CULTURE ID PANEL [[id number]]  (Abnormal) Collected:  05/24/20 1604 Order Status:  Completed Specimen:  Blood Updated:  05/25/20 4878 Acc. no. from [employer] [id number] Staphylococcus Detected Staphylococcus aureus Detected Comment: RESULTS VERIFIED, PHONED TO AND READ BACK BY 
Sandy Bardales RN @ 8147 ON 5/25/2020 AK. mecA (Methicillin-Resistance Genes) Detected Comment: Presence of mecA is highly indicative of methicillin resistance. The test does not replace traditional culture and susceptibilities RESULTS VERIFIED, PHONED TO AND READ BACK BY 
Sandy Bardales RN @ 0036 ON 05/25/2020 AK. INTERPRETATION Gram positive cocci in clusters, identified by realtime PCR as SUSPECTED MRSA. Comment: Recommend IV vancomycin use, unlikely to be a contaminant. Infectious Diseases Consult recommended in adult patients. THIS TEST DOES NOT REPLACE SENSITIVITY TESTING. Current Meds: 
Current Facility-Administered Medications Medication Dose Route Frequency  vancomycin (VANCOCIN) 1,000 mg in 0.9% sodium chloride (MBP/ADV) 250 mL  1,000 mg IntraVENous ONCE  
 furosemide (LASIX) injection 40 mg  40 mg IntraVENous BID  Vancomycin Intermittent Dosing   Other Rx Dosing/Monitoring  ondansetron (ZOFRAN) injection 4 mg  4 mg IntraVENous Q6H PRN  
 tuberculin injection 5 Units  5 Units IntraDERMal ONCE  warfarin (COUMADIN) tablet 5 mg  5 mg Oral QPM  
 0.9% sodium chloride infusion 250 mL  250 mL IntraVENous PRN  
 lidocaine (XYLOCAINE) 10 mg/mL (1 %) injection 0.1 mL  0.1 mL SubCUTAneous PRN  
 sodium chloride (NS) flush 5-40 mL  5-40 mL IntraVENous Q8H  
 sodium chloride (NS) flush 5-40 mL  5-40 mL IntraVENous PRN  
 0.9% sodium chloride infusion 250 mL  250 mL IntraVENous PRN  
 ALPRAZolam (XANAX) tablet 0.5 mg  0.5 mg Oral BID PRN  
 famotidine (PEPCID) tablet 20 mg  20 mg Oral DAILY  insulin glargine (LANTUS) injection 25 Units  25 Units SubCUTAneous QHS  NUTRITIONAL SUPPORT ELECTROLYTE PRN ORDERS   Does Not Apply PRN  
 meropenem (MERREM) 500 mg in 0.9% sodium chloride (MBP/ADV) 50 mL  0.5 g IntraVENous Q8H  
 sodium chloride (NS) flush 5-40 mL  5-40 mL IntraVENous Q8H  
 sodium chloride (NS) flush 5-40 mL  5-40 mL IntraVENous PRN  
 acetaminophen (TYLENOL) tablet 650 mg  650 mg Oral Q4H PRN  
 albuterol (PROVENTIL VENTOLIN) nebulizer solution 2.5 mg  2.5 mg Nebulization Q6H RT  
 insulin lispro (HUMALOG) injection   SubCUTAneous AC&HS  
 buPROPion (WELLBUTRIN) tablet 75 mg  75 mg Oral BID  midodrine (PROAMATINE) tablet 10 mg  10 mg Oral TID  OLANZapine (ZyPREXA) tablet 5 mg  5 mg Oral QHS  mirtazapine (REMERON) tablet 15 mg  15 mg Oral QHS  sertraline (ZOLOFT) tablet 100 mg  100 mg Oral DAILY  tamsulosin (FLOMAX) capsule 0.4 mg  0.4 mg Oral DAILY Other Studies: 
 
Xr Foot Rt Ap/lat Result Date: 5/24/2020 Right foot CLINICAL INDICATION: Right foot swelling and erythema, recent toe amputations FINDINGS: Two views of the right foot show amputation across the MTP joints of the first through fourth toes.  There are small bone fragments in the amputation plane with irregular soft tissue swelling and thickening noted distally at the level the amputation. There is irregularity along the bony cortices of the first, second, and third metatarsal heads. Osteomyelitis cannot be excluded by plain radiography. The fifth toe is unremarkable. IMPRESSION: Soft tissue swelling and irregularity along the amputation plane of the right first through fourth toes. Osteomyelitis is a consideration. Consider further evaluation with MRI to better evaluate for osteomyelitis. Xr Chest [facility] Result Date: 5/24/2020 Portable chest x-ray CLINICAL INDICATION: Dyspnea FINDINGS: Single AP view of the chest compared to a similar exam dated 5/15/2020 shows stable bilateral pulmonary edema pattern. A pacer device is in place. Postcardiac valve replacement changes evident. Small bilateral pleural effusions suspected. IMPRESSION: Acute CHF exacerbation. Assessment and Plan: Active Hospital Problems Diagnosis Date Noted  Pulmonary edema, noncardiac 05/25/2020  Osteomyelitis ([address] 05/24/2020  DEJUAN (acute kidney injury) ([address] 05/24/2020  PAD (peripheral artery disease) ([facility] 05/14/2020  Gangrene of toe of both feet ([address] 05/11/2020  Pacemaker 04/27/2020 Dual chamber Biotronik pacemaker placed (1/24/20): AV block post op AVR.  Long term (current) use of anticoagulants 04/27/2020  DNR (do not resuscitate) 02/26/2020  Hypotension 02/18/2020  
 HIT ([facility]parin[facility]induced thrombocytopenia) [facility] 01/23/2020  Atrial fibrillation ([address] 01/13/2020 Post op CABG.  Status post aortic valve replacement 01/10/2020 1. AVR (1/16/20):  25 mm Intuity Jon Gutierres Valve. 2.  Echo (4/12/20):  EF 55-60%. Normal functioning AVR. MG 15. PG 26. Mild MR.  
  
 S/P CABG x 3 01/10/2020  CAD (coronary artery disease) 01/10/2020 CABG (1/16/20):  LIMA to LAD. SVG to OM. SVG to PDA.  Type 2 diabetes with nephropathy ([address] 08/26/2019  Obesity, morbid ([address] 10/19/2018 Plan: Acute on chronic respiratory failure with hypoxia due to pulm edema and sepsis - Pulm edema likely non cardiogenic as EF is normal by Echo. Likely due to renal disease and volume resuscitation in sepsis. - continue IV lasix - good uop 
- supplemental O2 (2-3L). Bipap at night. - cards and pulm following. Severe Sepsis with shock due to gangrene MRSA bacteremia, ESBL e coli R foot infection, possible osteomyelitis. S/p washout 5/27 Rt foot infection with possible osteomyelitis - Right foot X-ray: Soft tissue swelling and irregularity along the amputation plane of the right first through fourth toes. - Wound cx (5/24) shows ESBL e coli, MRSA, and enterococcus faecalis - TTE is neg for vegitation. - TERRI if cultures fail to clear - ID following: IV vancomycin, IV Meropenem - BCx still positive as of 5/25, repeated 5/28 and NG in 48h CAD s/p CABG and PPM  
pAfib S/p AVR (1/2020) HLD 
- home midodrine for hypotension, florinef stopped 
- coumadin anticoagulation Supratherapeutic INR, Anemia 
- INR greater than 9 initially, now therapeutic after holding and vit K 
- unclear bleeding source, but Hg dropped from 8 to 6.8.  
- Hg now stable after transfusion  
- coumadin was held but can resume now 
- goal 2-3 early bioprosthetic valve DEJUAN on CKD Stage 4 (baseline Cr. 1.5 - 1.8 in 5/2020) - SCr improved on lasix T2DM 
- lantus 25units qHS and ISS 
- diabetic diet BPH: c/w home meds Diet:  DIET DIABETIC CONSISTENT CARB 
DVT PPx: anticoagulated Code: DNR Signed By: Iliana Brown MD   
 May 30, 2020

## 2020-05-30 NOTE — PROGRESS NOTES
Hourly rounds complete. Pt bed is in a low and locked position. Pt was able to sit up in the chair for a couple hours during the shift. Pt was able to transfer back to the bed with assist x2. All pt needs met during the shift. Bedside shift report will be given to the oncoming nurse.

## 2020-05-30 NOTE — PROGRESS NOTES
Problem: Mobility Impaired (Adult and Pediatric) Goal: *Acute Goals and Plan of Care (Insert Text) Description: LTG: 
(1.)Mr. Liz Phalen will move from supine to sit and sit to supine , scoot up and down and roll side to side in bed with MODIFIED INDEPENDENCE within 7 treatment day(s). (2.)Mr. Liz Phalen will transfer from bed to chair and chair to bed with MODIFIED INDEPENDENCE using the least restrictive device maintaining heel WBing R LE within 7 treatment day(s). (3.)Mr. Liz Phalen will ambulate with MODIFIED INDEPENDENCE for 50+ feet maintaining heel WBing R LE with the least restrictive device within 7 treatment day(s). ________________________________________________________________________________________________ Outcome: Progressing Towards Goal 
  
PHYSICAL THERAPY: Initial Assessment and Daily Note 5/30/2020 INPATIENT: PT Visit Days : 1 Heel WBing R LE 
Payor: Chevy Leonardo / Plan: [employer] / Product Type: Managed Care Medicare /   
  
NAME/AGE/GENDER: Dane Hernandez is a 69 y.o. male PRIMARY DIAGNOSIS: Severe sepsis ([address] [A41.9, R65.20] Lactic acidosis [E87.2] Acute CHF (congestive heart failure) ([address] [I50.9] Osteomyelitis ([address] [M86.9] Hypotension [I95.9] Severe sepsis with septic shock (HCC) Severe sepsis with septic shock ([address] Procedure(s) (LRB): 
RIGHT FOOT DEBRIDEMENT/ ROOM 608 (Right) 3 Days Post-Op ICD-10: Treatment Diagnosis:  
 · Other abnormalities of gait and mobility (R26.89) Precaution/Allergies: 
Heparin; Amoxicillin; and Keflex [cephalexin] ASSESSMENT:  
 
Mr. Liz Phalen presents supine in bed on O2 via nasal cannula. He recently (5/27/20) underwent debridement of R forefoot by vasculature surgery and needs to be heel WBing per chart. Patient reports that he ambulates with RW independently in home but does require assistance with sit to supine to lift his LE's onto the bed.  Patient transitioned to sit with CGA with [facility] elevated. B LE strength WFL. Patient stood with min A and transferred to recliner with RW and min A. He was unable to maintain R heel WBing. Called Dr. Chace Odell who was on call for vasculature and notified him. He okayed ordering an off-loading shoe from orthotist, order placed. Mr. Armando Brink would benefit from skilled physical therapy (medically necessary) to address his deficits and maximize his function. Initiated skilled PT with LE exercises. Patient with great effort and participation. This section established at most recent assessment PROBLEM LIST (Impairments causing functional limitations): 1. Decreased Transfer Abilities 2. Decreased Ambulation Ability/Technique 3. Decreased Balance 4. Decreased Activity Tolerance 5. Decreased [place] with Home Exercise Program 
 INTERVENTIONS PLANNED: (Benefits and precautions of physical therapy have been discussed with the patient.) 1. Balance Exercise 2. Bed Mobility 3. Gait Training 4. Therapeutic Activites 5. Therapeutic Exercise/Strengthening 6. Transfer Training 7. education TREATMENT PLAN: Frequency/Duration: 3 times a week for duration of hospital stay Rehabilitation Potential For Stated Goals: Good REHAB RECOMMENDATIONS (at time of discharge pending progress):   
Placement: It is my opinion, based on this patient's performance to date, that Mr. Armando Brink may benefit from intensive therapy at a [address] [place] [address] after discharge due to the functional deficits listed above that are likely to improve with skilled rehabilitation and concerns that he/she may be unsafe to be unsupervised at home due to decreased mobility. Equipment:  
? Off-loading shoe. HISTORY:  
History of Present Injury/Illness (Reason for Referral): 
Per MD note, \"Chief complaint: Chills, dyspnea, fever, hypotension HPI: Morbidly obese patient on chronic 2-3 L home O2 recently discharged from this hospital by Dr. 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 Patient had aortic valve replacement with a bioprosthetic aortic valve and postop was noted to have gangrenous toes and has significant peripheral vascular disease. Longstanding diabetes with diabetic nephropathy chronic kidney disease stage III with May 8 serum creatinine being 1.5. Chronic anticoagulation with warfarin for paroxysmal A. fib and recently aortic valve replacement but bioprosthetic. He has a permanent pacemaker, status post coronary artery bypass graft, bilateral carotid stenosis, and recent diagnosis of heparin-induced thrombocytopenia. He did see cardiology Friday2 days ago and claims medications were changed but he cannot tell me which ones. He cannot tell me the dose of his warfarin. I do have a discharge summary and reviewed MAR. He apparently was on the toilet and had near syncope, EMS was notified. Patient reports symptoms of dyspnea dyspnea on exertion chills and EMS reported documented fever but I could not locate documentation. On arrival to emergency department he is found to have a brain natruretic peptide of 4134 with recent prior being 2600 May 15. And chest radiographic appearance of bilateral alveolar edema consistent with acute pulmonary edema/CHF. He has preserved LVEF on his recent echocardiogram to evaluate his bioprosthetic aortic valve. His EKG is read as sinus tach although P waves are not easily identified his rhythm is regular. He has a history of paroxysmal A. fib chronically. His serum lactate level is 3 he has acute kidney injury with serum creatinine 2.0 his glucose is 200. His hemoglobin is 8 white blood count is 15,000 with left shift. His ABG is not bad with pH of 7.37 PCO2 of 47 PaO2 of 67 on 3 L 92% but clinical decompensation since that time according to nursing in the ER.   He is currently unable to speak complete sentences and pulse ox is below 90 on nasal cannula and blood pressure is borderline. I spoke with ER nurses and Turner catheter and 40 mg IV Lasix to be ordered. He is being admitted to the intensive care unit with severe sepsis with lactic acidosis and acute kidney injury likely osteomyelitis source has bilateral feet are malodorous. Imaging studies consistent with possible osteomyelitis. He also has acute pulmonary edema and borderline hypotension. He will be admitted to the unit despite DNR status due to requested use of BiPAP if needed, and possible pressor support. For now we will continue vancomycin as single agent but will seek opinion of intensivist and will consult ID, cardiology, vascular, wound care. Pharmacy will manage warfarinstat PT INR pending. Other significant lab data includes procalcitonin level at 8. He was not tested for COVID-19difficult to obtain adequate history due to patient respiratory distress at time of my evaluation. It is noted that he was discharged with Midrin as well as Florinef. Florinef will obviously be held but we may try Midrin to avoid need for pressor support. Will await evaluation from intensivist/critical care. Call placed\" Past Medical History/Comorbidities:  
Mr. Jesus Hagan  has a past medical history of Acute renal failure on dialysis [facility]) (2/25/2020), Arthritis, BPH (benign prostatic hyperplasia) (1/14/2013), CAD (coronary artery disease), DM type 2 (diabetes mellitus, type 2) ([facility] (dx 2004), Dyspnea, Gout (1/14/2013), HLD (hyperlipidemia) (1/14/2013), HTN (hypertension), Morbid obesity ([address] (9/3/14), Oxygen dependent, Psychiatric disorder, Rheumatic fever, Seasonal allergic rhinitis, Severe aortic valve stenosis, Thyroid disease, and Unspecified sleep apnea (2016).   Mr. Jesus Hagan  has a past surgical history that includes hx tonsil and adenoidectomy; ir insert tunl cvc w port over 5 years (2/4/2020); hx heart catheterization (12/23/2019); vascular surgery procedure unlist; hx orthopaedic (Left); and hx cataract removal (Bilateral, 2012). Social History/Living Environment:  
Home Environment: Private residence # Steps to Enter: 2 One/Two Story Residence: One story Living Alone: No 
Support Systems: Spouse/Significant Other/Partner, Family member(s) Patient Expects to be Discharged to[de-identified] Private residence Current DME Used/Available at Home: Oxygen, portable, Wheelchair, Nebulizer, Walker, [employer] chair, Commode, bedside Prior Level of Function/Work/Activity: He recently (5/27/20) underwent debridement of R forefoot by vasculature surgery and needs to be heel WBing per chart. Patient reports that he ambulates with RW independently in home but does require assistance with sit to supine to lift his LE's onto the bed. Number of Personal Factors/Comorbidities that affect the Plan of Care: 3+: HIGH COMPLEXITY EXAMINATION:  
Most Recent Physical Functioning:  
Gross Assessment: 
AROM: Generally decreased, functional 
Strength: Generally decreased, functional 
         
  
Posture: 
Posture (WDL): Exceptions to [facility] Posture Assessment: Forward head, Rounded shoulders Balance: 
Sitting: Intact Standing: Impaired Standing - Static: Constant support Standing - Dynamic : Constant support Bed Mobility: 
Supine to Sit: Contact guard assistance Scooting: Contact guard assistance Wheelchair Mobility: 
  
Transfers: 
Sit to Stand: Minimum assistance Stand to Sit: Contact guard assistance Bed to Chair: Minimum assistance Gait: 
Right Side Weight Bearing: Heel Gait Abnormalities: Decreased step clearance Distance (ft): 2 Feet (ft) Assistive Device: Walker, rolling;Gait belt Ambulation - Level of Assistance: Minimal assistance Interventions: Safety awareness training;Verbal cues Body Structures Involved: 1. Heart 2. Bones 3. Joints 4. Muscles Body Functions Affected: 1. Cardio 2. Movement Related 3. Skin Related Activities and Participation Affected: 1. Mobility 2. Self Care 3. Domestic Life Number of elements that affect the Plan of Care: 4+: HIGH COMPLEXITY CLINICAL PRESENTATION:  
Presentation: Evolving clinical presentation with changing clinical characteristics: MODERATE COMPLEXITY CLINICAL DECISION MAKIN[address] [place] [address] AM-PAC 6 Clicks Basic Mobility Inpatient Short Form How much difficulty does the patient currently have. .. Unable A Lot A Little None 1. Turning over in bed (including adjusting bedclothes, sheets and blankets)? [] 1   [] 2   [x] 3   [] 4  
2. Sitting down on and standing up from a chair with arms ( e.g., wheelchair, bedside commode, etc.)   [] 1   [] 2   [x] 3   [] 4  
3. Moving from lying on back to sitting on the side of the bed? [] 1   [] 2   [x] 3   [] 4 How much help from another person does the patient currently need. .. Total A Lot A Little None 4. Moving to and from a bed to a chair (including a wheelchair)? [] 1   [] 2   [x] 3   [] 4  
5. Need to walk in hospital room? [] 1   [x] 2   [] 3   [] 4  
6. Climbing 3-5 steps with a railing? [] 1   [x] 2   [] 3   [] 4  
© , Trustees of [address] [place] [address], under license to [employer]. All rights reserved Score:  Initial: 16 Most Recent: X (Date: -- ) Interpretation of Tool:  Represents activities that are increasingly more difficult (i.e. Bed mobility, Transfers, Gait). Medical Necessity:    
· Patient is expected to demonstrate progress in  
· functional technique and activity tolerance ·  to  
· increase independence with   and improve safety during all functional mobility · . Reason for Services/Other Comments: 
· Patient continues to require skilled intervention due to · medical complications and patient unable to attend/participate in therapy as expected · . Use of outcome tool(s) and clinical judgement create a POC that gives a: Clear prediction of patient's progress: LOW COMPLEXITY  
  
 
 
 
TREATMENT:  
(In addition to Assessment/Re-Assessment sessions the following treatments were rendered) Pre-treatment Symptoms/Complaints:  none. Pain: Initial:  
Pain Intensity 1: 0  Post Session:  0 Therapeutic Exercise: ( 10 minutes):  Exercises per grid below to improve mobility and strength. Required minimal visual and verbal cues to promote proper body alignment. Progressed complexity of movement as indicated. DATE: 5/30/20 Ambulation Hip Flexion X20 AB [address] Hip ab/ad Heel Raises X20 AB Toe Raises X20 AB Key:  A=active, AA=active assisted, P=passive, B=bilaterally, R=right, L=left DF=dorsiflexion, PF=plantarflexion Braces/Orthotics/Lines/Etc:  
· IV 
· O2 Device: Hi flow nasal cannula Treatment/Session Assessment:   
· Response to Treatment:  pleasant, cooperative. · Interdisciplinary Collaboration:  
o Physical Therapist 
o Registered Nurse 
o Physician · After treatment position/precautions:  
o Up in chair 
o Bed/Chair-wheels locked 
o Call light within reach 
o RN notified · Compliance with Program/Exercises: Compliant all of the time, Will assess as treatment progresses · Recommendations/Intent for next treatment session: \"Next visit will focus on advancements to more challenging activities and reduction in assistance provided\". Total Treatment Duration: PT Patient Time In/Time Out Time In: 0696 Time Out: 1138 A Shania Abdul, PT, DPT

## 2020-05-30 NOTE — PROGRESS NOTES
Pt is resting comfortably in bed. Pt is alert and oriented x4. Pt voice is hoarse. Pt dressing on right foot is clean, dry, and intact. Will continue to monitor care.

## 2020-05-30 NOTE — PROGRESS NOTES
Warfarin dosing per pharmacist 
 
Shivani Pollard is a 69 y.o. male. Height: 5' 10\" (177.8 cm)    Weight: 111.1 kg (245 lb) Indication:  Prevention of thromboembolism (prosthetic heart valves) Goal INR:  2-3 Home dose:  7.5 mg MWF, 5 mg all other days Risk factors or significant drug interactions:  None Other anticoagulants:  N/A *History of HIT Daily Monitoring Date  INR     Warfarin dose HGB              Notes 5/25  7.4  Hold  8.5 2.5 mg PO Vit K admin 5/26  >9.9  Hold  7.0 10 mg PO Vit K admin 5/27  2.4  Hold  6.8        1 unit PRBC transfused 5/28  2  Hold  7.6  1 unit PRBC transfused 5/29  1.8  5 mg  9.5 5/30  1.2  5 mg   10.2 Patient with labile INRs since admission, increased to > 9.9 on 5/26. INR down to 2 after 2 doses of Vit K given total this admission. Patient will receive another 5 mg dose of warfarin this evening. Pharmacy will continue to follow patient and monitor INR daily. Thank you, Cristian Iraheta, PharmD Clinical Pharmacy PGY1 Resident [phone]

## 2020-05-30 NOTE — PROGRESS NOTES
[facility] CARDIOLOGY PROGRESS NOTE 
      
 
5/30/2020 4:57 PM 
 
Admit Date: 5/24/2020 Subjective:  
Patient denies any chest pain or dyspnea. Feels better. ROS: 
Cardiovascular:  As noted above Objective:  
  
Vitals:  
 05/30/20 3360 05/30/20 4581 05/30/20 0720 05/30/20 5586 BP:  144/79  141/76 Pulse:  79  87 Resp:  22  20 Temp:    98.4 °F (36.9 °C) SpO2: 95% 97% 91% 92% Weight:  111.1 kg (245 lb) Height:      
 
 
Physical Exam: 
General-No Acute Distress Neck- supple, no JVD 
CV- regular rate and rhythm no MRG Lung- clear bilaterally Abd- soft, nontender, nondistended Ext- trivial edema bilaterally. Skin- warm and dry Data Review:  
Recent Labs 05/30/20 
7370 05/29/20 
3120  141  
K 3.9 3.9 BUN 36* 35* CREA 1.48 1. 55* * 111* WBC 12.9* 9.3 HGB 10.2* 9.5* HCT 32.0* 30.5*  280 INR 1.2 1.8 No results found for: JENNA Luna Echo (5/25/20): -  Left ventricle: Systolic function was normal. Ejection fraction was estimated in the range of 55 % to 60 %. There were no regional wall motion abnormalities. There was mild concentric hypertrophy.  
-  Left atrium: The atrium was mildly dilated.  
-  Aortic valve: A 25mm [employer] Knowles Gutierres valve was present  
(1/10/20).   
-  Mitral valve: There was mild annular calcification. There was mild 
regurgitation.  
-  Tricuspid valve: There was mild regurgitation. Assessment/Plan:  
 
Principal Problem: 
  Severe sepsis with septic shock ([address] (5/24/2020) Continue antibiotics. ID following. Active Problems: 
   Obesity, morbid ([address] (10/19/2018) Noted. Needs weight loss. Type 2 diabetes with nephropathy ([address] (8/26/2019) Stable. Daily BMP. CAD (coronary artery disease) (1/10/2020) No angina. Status post aortic valve replacement (1/10/2020) Normal function on echo. Atrial fibrillation ([address] (1/13/2020) REstarted coumadin. HIT (heparin-induced thrombocytopenia) ([facility] (1/23/2020) On coumadin. Avoid heparin. Hypotension (2/18/2020) Monitor with treating sepsis. DNR (do not resuscitate) (2/26/2020) Noted. Acute on chronic respiratory failure with hypoxia ([facility] (3/8/2020) On Lasix. DAily BMP. Pacemaker (4/27/2020) STable.    
 
   
 
 
 
 
Kathy Ruiz MD 
5/30/2020 4:57 PM

## 2020-05-31 NOTE — PROGRESS NOTES
Hospitalist Note Admit Date:  2020  4:04 PM  
Name:  Saran Gracia Age:  69 y.o. 
:  1945 MRN:  [id number] PCP:  John Patiño MD 
Treatment Team: Attending Provider: Guanaco Morales MD; Consulting Provider: Dangelo Desir MD; Consulting Provider: Reina Byers MD; Consulting Provider: Luc Onofre MD; Consulting Provider: Beth Juan MD; Utilization Review: Marielena Turner RN; Care Manager: Gregory Sim, OLYA; Primary Nurse: Marilyn Garcia, OLYA; Primary Nurse: Pedro Johnson; Occupational Therapist: Ebenezer Ames HPI/Subjective:  
Saran Gracia is a 69 y.o. male with past medical history significant for DVT, HIT, A. fib, morbid obesity, osteomyelitis, CKD, hemodialysis, hypertension, sleep apnea and type 2 diabetes who was brought in by EMS after heaving a near syncopal episode at home. He was admitted to ICU due to acute respiratory failure, severe sepsis The patient had a recent surgery by Dr Jayme Gatica on may 15 for removal of toes due to gangrene. Blood cultures positive for MRSA and foot wound with MRSA, ESBL e coli, and enterococcus. ID and vascular surgery following, washout of foot on : 
Repeat cultures  NGTD Cr baseline. Hg stable after transfusion. 2L negative yesterday, now dizzy sitting up. Cards stopped lasix. O2 100% on 4L NC. Wears 2-3L at home. Objective:  
 
Patient Vitals for the past 24 hrs: 
 Temp Pulse Resp BP SpO2  
20 0859     100 % 20 0815 97.5 °F (36.4 °C) 81 19 129/73 93 % 20 0618 97.7 °F (36.5 °C) 74 18 122/71 95 % 20 0218     96 % 20 0052 98 °F (36.7 °C) 85 17 120/72 99 % 20 2241  93     
20 2220  (!) 119  120/79 98 % 20 2132     95 % 20 2026 98.9 °F (37.2 °C) (!) 129 18 139/73 91 % 20 1953     95 % 20 1734 99.1 °F (37.3 °C) 70 18 163/88 96 % 20 1347     96 % 05/30/20 1307 99.7 °F (37.6 °C) 88 19 146/85 92 % Oxygen Therapy O2 Sat (%): 100 % (05/31/20 0859) Pulse via Oximetry: 78 beats per minute (05/31/20 0859) O2 Device: BIPAP (05/31/20 0218) O2 Flow Rate (L/min): 4 l/min (05/31/20 0859) FIO2 (%): 36 % (05/31/20 0859) Estimated body mass index is 35.15 kg/m² as calculated from the following: 
  Height as of this encounter: 5' 10\" (1.778 m). Weight as of this encounter: 111.1 kg (245 lb). Intake/Output Summary (Last 24 hours) at 5/31/2020 1106 Last data filed at 5/31/2020 8844 Gross per 24 hour Intake  Output 2100 ml Net -2100 ml *Note that automatically entered I/Os may not be accurate; dependent on patient compliance with collection and accurate  by techs. Physical Exam:  
General:     No distress Head:   normocephalic, atraumatic Eyes, Ears, nose: PERRL, EOMI. Normal conjunctiva Neck:    supple, non-tender. Trachea midline. Lungs:   Crackles bases Cardiac:   RRR, Normal S1 and S2. Abdomen:   Soft, non distended, nontender, +BS, no guarding/rebound Extremities:   Warm, dry. no edema. Multiple toe amputations, dressing c/d/i Skin:   No rashes, no jaundice Neuro:  AAOx3. No gross focal neurological deficit Psychiatric:  No anxiety, calm, cooperative Data Review: 
I have reviewed all labs, meds, and studies from the last 24 hours: 
 
Recent Results (from the past 24 hour(s)) GLUCOSE, POC Collection Time: 05/30/20 11:16 AM  
Result Value Ref Range Glucose (POC) 198 (H) 65 - 100 mg/dL GLUCOSE, POC Collection Time: 05/30/20  4:10 PM  
Result Value Ref Range Glucose (POC) 198 (H) 65 - 100 mg/dL PLEASE READ & DOCUMENT PPD TEST IN 24 HRS Collection Time: 05/30/20  5:02 PM  
Result Value Ref Range PPD Negative Negative  
 mm Induration 0 0 - 5 mm GLUCOSE, POC Collection Time: 05/30/20  8:25 PM  
Result Value Ref Range Glucose (POC) 273 (H) 65 - 100 mg/dL GLUCOSE, POC  
 Collection Time: 05/31/20  7:04 AM  
Result Value Ref Range Glucose (POC) 129 (H) 65 - 100 mg/dL PROTHROMBIN TIME + INR Collection Time: 05/31/20  7:06 AM  
Result Value Ref Range Prothrombin time 16.7 (H) 12.0 - 14.7 sec INR 1.3    
CBC W/O DIFF Collection Time: 05/31/20  7:06 AM  
Result Value Ref Range WBC 12.4 (H) 4.3 - 11.1 K/uL  
 RBC 3.59 (L) 4.23 - 5.6 M/uL HGB 9.9 (L) 13.6 - 17.2 g/dL HCT 32.0 (L) 41.1 - 50.3 % MCV 89.1 79.6 - 97.8 FL  
 MCH 27.6 26.1 - 32.9 PG  
 MCHC 30.9 (L) 31.4 - 35.0 g/dL  
 RDW 15.7 (H) 11.9 - 14.6 % PLATELET 677 321 - 661 K/uL MPV 10.1 9.4 - 12.3 FL ABSOLUTE NRBC 0.00 0.0 - 0.2 K/uL METABOLIC PANEL, BASIC Collection Time: 05/31/20  7:06 AM  
Result Value Ref Range Sodium 136 136 - 145 mmol/L Potassium 3.9 3.5 - 5.1 mmol/L Chloride 96 (L) 98 - 107 mmol/L  
 CO2 35 (H) 21 - 32 mmol/L Anion gap 5 (L) 7 - 16 mmol/L Glucose 127 (H) 65 - 100 mg/dL BUN 39 (H) 8 - 23 MG/DL Creatinine 1.78 (H) 0.8 - 1.5 MG/DL  
 GFR est AA 48 (L) >60 ml/min/1.73m2 GFR est non-AA 40 (L) >60 ml/min/1.73m2 Calcium 8.6 8.3 - 10.4 MG/DL Michaelle Shrestha Collection Time: 05/31/20 10:11 AM  
Result Value Ref Range Vancomycin, random 17.8 UG/ML All Micro Results Procedure Component Value Units Date/Time CULTURE, BLOOD [[id number]] Collected:  05/28/20 1625 Order Status:  Completed Specimen:  Blood Updated:  05/31/20 5761 Special Requests: --     
  RIGHT 
HAND Culture result: NO GROWTH 3 DAYS     
 CULTURE, BLOOD [[id number]] Collected:  05/28/20 1500 Order Status:  Completed Specimen:  Blood Updated:  05/31/20 9357 Special Requests: --     
  LEFT 
HAND Culture result: NO GROWTH 3 DAYS     
 CULTURE, BLOOD [[id number]]  (Abnormal) Collected:  05/25/20 1543 Order Status:  Completed Specimen:  Blood Updated:  05/30/20 6773   Special Requests: --     
  RIGHT 
HAND 
  
  GRAM STAIN    
 GRAM POS COCCI IN CLUSTERS  
     
   AEROBIC BOTTLE POSITIVE RESULTS VERIFIED, PHONED TO AND READ BACK BY SHYAM Rosales [facility] 15. AT [address] [place] [address] ON 5.28.20   Culture result: STAPHYLOCOCCUS AUREUS     
   SENSITIVITY TO FOLLOW CULTURE, Constance Page STAIN [[id number]]  (Abnormal)  (Susceptibility) Collected:  05/25/20 0200 Order Status:  Completed Specimen:  Wound from Foot Updated:  05/29/20 1861 Special Requests: NO SPECIAL REQUESTS     
  GRAM STAIN NO WBCS SEEN     
   NO EPITHELIAL CELLS SEEN     
      
  MODERATE GRAM POSITIVE COCCI MODERATE GRAM NEGATIVE RODS Culture result:    
  HEAVY ESCHERICHIA COLI ** (EXTENDED SPECTRUM BETA LACTAMASE ) ** MODERATE ENTEROCOCCUS FAECALIS GROUP D MODERATE * METHICILLIN RESISTANT STAPHYLOCOCCUS AUREUS *  
     
      
  ESBL CALLED TO AND CORRECTLY REPEATED BY: 
Naima Wilson RN @ 1372 ON 5/28/20 AK. 
  
      
  ** MULTI-DRUG RESISTANT ORGANISM ** PATIENT IS A KNOWN MRSA SCANT MIXED SKIN BRENNAN ISOLATED  
     
 CULTURE, URINE [[id number]]  (Abnormal) Collected:  05/24/20 1900 Order Status:  Completed Specimen:  Cath Urine Updated:  05/28/20 0741 Special Requests: NO SPECIAL REQUESTS Culture result:    
  6000 COLONIES/mL SUKUMAR ALBICANS  
     
 CULTURE, BLOOD [[id number]]  (Abnormal) Collected:  05/25/20 1543 Order Status:  Completed Specimen:  Blood Updated:  05/28/20 7812 Special Requests: --     
  RIGHT Antecubital 
  
  GRAM STAIN GRAM POSITIVE COCCI AEROBIC BOTTLE POSITIVE RESULTS VERIFIED, PHONED TO AND READ BACK BY OLYA PERALTA @ 5279 5/27/20 MM Culture result: STAPHYLOCOCCUS AUREUS For Susceptibility Refer to Culture Accession [id number] CULTURE, BLOOD [[id number]]  (Abnormal) Collected:  05/24/20 1604 Order Status:  Completed Specimen:  Blood Updated:  05/28/20 4275 Special Requests: RIGHT ANTECUBITAL     
  GRAM STAIN    
  GRAM POS COCCI IN CLUSTERS  
     
      
  AEROBIC AND ANAEROBIC BOTTLES  
     
      
  CRITICAL RESULT NOT CALLED DUE TO PREVIOUS NOTIFICATION OF CRITICAL RESULT WITHIN THE LAST 24 HOURS. Culture result: STAPHYLOCOCCUS AUREUS For Susceptibility Refer to Culture Accession [id number] CULTURE, BLOOD [[id number]]  (Abnormal)  (Susceptibility) Collected:  05/24/20 1604 Order Status:  Completed Specimen:  Blood Updated:  05/28/20 2868 Special Requests: LEFT ANTECUBITAL     
  GRAM STAIN GRAM POSITIVE COCCI AEROBIC AND ANAEROBIC BOTTLES RESULTS VERIFIED, PHONED TO AND READ BACK BY Mamadou Suazo @ 0503 5/25/20 MM Culture result:    
  * METHICILLIN RESISTANT STAPHYLOCOCCUS AUREUS * REFER TO BIOFIRE  PANEL ACC [id number] RESULTS VERIFIED, PHONED TO AND READ BACK BY 
Augustus Petersen RN ON 5/28/20 @0732,  
  
 BLOOD CULTURE ID PANEL [[id number]]  (Abnormal) Collected:  05/24/20 1604 Order Status:  Completed Specimen:  Blood Updated:  05/25/20 1355 Acc. no. from [employer] [id number] Staphylococcus Detected Staphylococcus aureus Detected Comment: RESULTS VERIFIED, PHONED TO AND READ BACK BY 
Jayna Rodriguez RN @ 4928 ON 5/25/2020 AK. mecA (Methicillin-Resistance Genes) Detected Comment: Presence of mecA is highly indicative of methicillin resistance. The test does not replace traditional culture and susceptibilities RESULTS VERIFIED, PHONED TO AND READ BACK BY 
Jayna Rodriguez RN @ 0852 ON 05/25/2020 AK. INTERPRETATION Gram positive cocci in clusters, identified by realtime PCR as SUSPECTED MRSA. Comment: Recommend IV vancomycin use, unlikely to be a contaminant. Infectious Diseases Consult recommended in adult patients. THIS TEST DOES NOT REPLACE SENSITIVITY TESTING. Current Meds: Current Facility-Administered Medications Medication Dose Route Frequency  0.9% sodium chloride infusion  50 mL/hr IntraVENous CONTINUOUS  
 polyethylene glycol (MIRALAX) packet 17 g  17 g Oral DAILY  bisacodyL (DULCOLAX) tablet 5 mg  5 mg Oral DAILY PRN  
 Vancomycin Intermittent Dosing   Other Rx Dosing/Monitoring  ondansetron (ZOFRAN) injection 4 mg  4 mg IntraVENous Q6H PRN  
 warfarin (COUMADIN) tablet 5 mg  5 mg Oral QPM  
 0.9% sodium chloride infusion 250 mL  250 mL IntraVENous PRN  
 lidocaine (XYLOCAINE) 10 mg/mL (1 %) injection 0.1 mL  0.1 mL SubCUTAneous PRN  
 sodium chloride (NS) flush 5-40 mL  5-40 mL IntraVENous Q8H  
 sodium chloride (NS) flush 5-40 mL  5-40 mL IntraVENous PRN  
 0.9% sodium chloride infusion 250 mL  250 mL IntraVENous PRN  
 ALPRAZolam (XANAX) tablet 0.5 mg  0.5 mg Oral BID PRN  
 famotidine (PEPCID) tablet 20 mg  20 mg Oral DAILY  insulin glargine (LANTUS) injection 25 Units  25 Units SubCUTAneous QHS  NUTRITIONAL SUPPORT ELECTROLYTE PRN ORDERS   Does Not Apply PRN  
 meropenem (MERREM) 500 mg in 0.9% sodium chloride (MBP/ADV) 50 mL  0.5 g IntraVENous Q8H  
 sodium chloride (NS) flush 5-40 mL  5-40 mL IntraVENous Q8H  
 sodium chloride (NS) flush 5-40 mL  5-40 mL IntraVENous PRN  
 acetaminophen (TYLENOL) tablet 650 mg  650 mg Oral Q4H PRN  
 albuterol (PROVENTIL VENTOLIN) nebulizer solution 2.5 mg  2.5 mg Nebulization Q6H RT  
 insulin lispro (HUMALOG) injection   SubCUTAneous AC&HS  
 buPROPion (WELLBUTRIN) tablet 75 mg  75 mg Oral BID  midodrine (PROAMATINE) tablet 10 mg  10 mg Oral TID  OLANZapine (ZyPREXA) tablet 5 mg  5 mg Oral QHS  mirtazapine (REMERON) tablet 15 mg  15 mg Oral QHS  sertraline (ZOLOFT) tablet 100 mg  100 mg Oral DAILY  tamsulosin (FLOMAX) capsule 0.4 mg  0.4 mg Oral DAILY Other Studies: 
 
Xr Foot Rt Ap/lat Result Date: 5/24/2020 Right foot CLINICAL INDICATION: Right foot swelling and erythema, recent toe amputations FINDINGS: Two views of the right foot show amputation across the MTP joints of the first through fourth toes. There are small bone fragments in the amputation plane with irregular soft tissue swelling and thickening noted distally at the level the amputation. There is irregularity along the bony cortices of the first, second, and third metatarsal heads. Osteomyelitis cannot be excluded by plain radiography. The fifth toe is unremarkable. IMPRESSION: Soft tissue swelling and irregularity along the amputation plane of the right first through fourth toes. Osteomyelitis is a consideration. Consider further evaluation with MRI to better evaluate for osteomyelitis. Xr Chest [facility] Result Date: 5/24/2020 Portable chest x-ray CLINICAL INDICATION: Dyspnea FINDINGS: Single AP view of the chest compared to a similar exam dated 5/15/2020 shows stable bilateral pulmonary edema pattern. A pacer device is in place. Postcardiac valve replacement changes evident. Small bilateral pleural effusions suspected. IMPRESSION: Acute CHF exacerbation. Assessment and Plan: Active Hospital Problems Diagnosis Date Noted  Pulmonary edema, noncardiac 05/25/2020  Osteomyelitis ([address] 05/24/2020  DEJUAN (acute kidney injury) ([address] 05/24/2020  PAD (peripheral artery disease) ([address] 05/14/2020  Gangrene of toe of both feet ([address] 05/11/2020  Pacemaker 04/27/2020 Dual chamber Biotronik pacemaker placed (1/24/20): AV block post op AVR.  Long term (current) use of anticoagulants 04/27/2020  DNR (do not resuscitate) 02/26/2020  Hypotension 02/18/2020  
 HIT (heparin-induced thrombocytopenia) ([facility]) 01/23/2020  Atrial fibrillation ([address] 01/13/2020 Post op CABG.  Status post aortic valve replacement 01/10/2020 1. AVR (1/16/20):  25 mm Intuity Jon Gutierres Valve. 2.  Echo (4/12/20):  EF 55-60%. Normal functioning AVR. MG 15. PG 26. Mild MR.  
  
 S/P CABG x 3 01/10/2020  CAD (coronary artery disease) 01/10/2020 CABG (1/16/20):  LIMA to LAD. SVG to OM. SVG to PDA.  Type 2 diabetes with nephropathy ([facility] 08/26/2019  Obesity, morbid ([facility] 10/19/2018 Plan: 
 
Acute on chronic respiratory failure with hypoxia due to pulm edema and sepsis - Pulm edema likely non cardiogenic as EF is normal by Echo. Likely due to renal disease and volume resuscitation in sepsis. - improved s/p IV lasix 
- supplemental O2 (2-3L). Bipap at night. - cards and pulm following. Severe Sepsis with shock due to gangrene MRSA bacteremia, ESBL e coli R foot infection, possible osteomyelitis. S/p washout 5/27 Rt foot infection with possible osteomyelitis - Right foot X-ray: Soft tissue swelling and irregularity along the amputation plane of the right first through fourth toes. - Wound cx (5/24) shows ESBL e coli, MRSA, and enterococcus faecalis - TTE is neg for vegitation. - TERRI if cultures fail to clear - ID following: IV vancomycin, IV Meropenem - BCx still positive as of 5/25, repeated 5/28 and NGTD 
 
CAD s/p CABG and PPM  
pAfib S/p AVR (1/2020) HLD 
- home midodrine for hypotension, florinef stopped - stable. Restart florinef if orthostasis persists off lasix 
- coumadin anticoagulation Supratherapeutic INR, Anemia 
- INR greater than 9 initially, now therapeutic after holding and vit K 
- unclear bleeding source, but Hg dropped from 8 to 6.8.  
- Hg now stable after transfusion  
- coumadin was held but can resume now 
- goal 2-3 early bioprosthetic valve DEJUAN on CKD Stage 4 (baseline Cr. 1.5 - 1.8 in 5/2020) - SCr improved on lasix T2DM 
- lantus 25units qHS and ISS 
- diabetic diet BPH: c/w home meds Diet:  DIET DIABETIC CONSISTENT CARB 
DVT PPx: anticoagulated Code: DNR 
 
DISPO Expect SNF for PT and IVABx in the next few days. COVID screen. Signed By: Kyree Carirllo MD   
 May 31, 2020

## 2020-05-31 NOTE — PROGRESS NOTES
[facility] CARDIOLOGY PROGRESS NOTE 
      
 
5/31/2020 4:57 PM 
 
Admit Date: 5/24/2020 Subjective:  
Patient notes dizziness with sitting up. Edema resolved. Prior orthostatic hypotension. ROS: 
Cardiovascular:  As noted above Objective:  
  
Vitals:  
 05/31/20 1607 05/31/20 9987 05/31/20 5624 05/31/20 6126 BP:  122/71 129/73 Pulse:  74 81 Resp:  18 19 Temp:  97.7 °F (36.5 °C) 97.5 °F (36.4 °C) SpO2: 96% 95% 93% 100% Weight:      
Height:      
 
 
Physical Exam: 
General-No Acute Distress Neck- supple, no JVD 
CV- regular rate and rhythm no MRG Lung- clear bilaterally Abd- soft, nontender, nondistended Ext- No edema bilaterally. Skin- warm and dry Data Review:  
Recent Labs 05/31/20 
0985 05/30/20 
3521  138  
K 3.9 3.9 BUN 39* 36* CREA 1.78* 1.48  
* 113* WBC 12.4* 12.9* HGB 9.9* 10.2* HCT 32.0* 32.0*  
 274 INR 1.3 1.2 No results found for: Earlean Gain, TNIPOC Echo (5/25/20): -  Left ventricle: Systolic function was normal. Ejection fraction was estimated in the range of 55 % to 60 %. There were no regional wall motion abnormalities. There was mild concentric hypertrophy.  
-  Left atrium: The atrium was mildly dilated.  
-  Aortic valve: A 25mm Intuity Knowles Gutierres valve was present  
(1/10/20).   
-  Mitral valve: There was mild annular calcification. There was mild 
regurgitation.  
-  Tricuspid valve: There was mild regurgitation. Assessment/Plan:  
 
Principal Problem: 
  Severe sepsis with septic shock ([facility] (5/24/2020) Continue antibiotics. ID following. Active Problems: 
   Obesity, morbid ([address] (10/19/2018) Noted. Needs weight loss. Type 2 diabetes with nephropathy ([facility] (8/26/2019) Stable. Daily BMP. CAD (coronary artery disease) (1/10/2020) No angina. Status post aortic valve replacement (1/10/2020) Normal function on echo. Atrial fibrillation ([facility] (1/13/2020) REstarted coumadin. HIT (heparin-induced thrombocytopenia) ([facility] (1/23/2020) On coumadin. Avoid heparin. Hypotension (2/18/2020) Stop Lasix. Gentle fluids today. DNR (do not resuscitate) (2/26/2020) Noted. Acute on chronic respiratory failure with hypoxia ([facility] (3/8/2020) Stop lasix as appears volume depleted with orthostatic symptoms. Address fluid status daily. Pacemaker (4/27/2020) STable.    
 
   
 
 
 
 
Tiffany Avery MD 
5/31/2020 4:57 PM

## 2020-05-31 NOTE — PROGRESS NOTES
Perfect serve message sent to Dr. Diana Ochoa to inform him of outstanding sars-covid test.  Will swab the pt and send to lab.

## 2020-05-31 NOTE — PROGRESS NOTES
Pharmacokinetic Consult to Pharmacist 
 
Ahmet Carranza is a 69 y.o. male being treated for SSTI-  with Vanc. Height: 5' 10\" (177.8 cm)  Weight: 111.1 kg (245 lb) Lab Results Component Value Date/Time BUN 39 (H) 05/31/2020 07:06 AM  
 Creatinine 1.78 (H) 05/31/2020 07:06 AM  
 WBC 12.4 (H) 05/31/2020 07:06 AM  
 Procalcitonin 8.17 05/24/2020 04:04 PM  
 Lactic acid 1.6 05/25/2020 01:08 AM  
  
Estimated Creatinine Clearance: 46.1 mL/min (A) (based on SCr of 1.78 mg/dL (H)). CULTURES: 
5/25 - Blood - 1/4 vials GPC 
5/25 - Wound - GNR and GPC 
5/24 - Urine - NGTD 
5/24 - Blood x 2 - 4/4 vials MRSA Lab Results Component Value Date/Time Vancomycin,trough 25.2 ([facility]) 05/29/2020 09:59 AM  
 Vancomycin, random 17.8 05/31/2020 10:11 AM  
 
 
Day 8 of vancomycin. Goal trough is 15-20mcg/ml. Vancomycin random level < 20 this morning. Will give 1000 mg one time dose now and continue to assess levels. Will continue to follow patient. Thank you, Linn Davalos, PharmD Clinical Pharmacy PGY1 Resident [phone]

## 2020-05-31 NOTE — PROGRESS NOTES
Warfarin dosing per pharmacist 
 
Deion Lopez is a 69 y.o. male. Height: 5' 10\" (177.8 cm)    Weight: 111.1 kg (245 lb) Indication:  Prevention of thromboembolism (prosthetic heart valves) Goal INR:  2-3 Home dose:  7.5 mg MWF, 5 mg all other days Risk factors or significant drug interactions:  None Other anticoagulants:  N/A *History of HIT Daily Monitoring Date  INR     Warfarin dose HGB              Notes 5/25  7.4  Hold  8.5 2.5 mg PO Vit K admin 5/26  >9.9  Hold  7.0 10 mg PO Vit K admin 5/27  2.4  Hold  6.8        1 unit PRBC transfused 5/28  2  Hold  7.6  1 unit PRBC transfused 5/29  1.8  5 mg  9.5 5/30  1.2  5 mg   10.2 
5/31  1.3  5 mg   9.9 Patient with labile INRs since admission, increased to > 9.9 on 5/26. INR down to 2 after 2 doses of Vit K given total this admission. INR slowly increasing. Will give another 5 mg of warfarin this evening. Pharmacy will continue to follow patient and monitor INR daily. Thank you, Linus Liz, PharmD Clinical Pharmacy PGY1 Resident [phone]

## 2020-05-31 NOTE — PROGRESS NOTES
Hourly rounds complete. Bed is in a low and locked position with all the pts personal items within reach. The pt had an uneventful shift. All pt needs met at this time. Bedside shift report will be given to the oncoming nurse.

## 2020-05-31 NOTE — PROGRESS NOTES
Pt resting comfortably in bed. Pt is alert and oriented x4. Pt denies pain at this time. Will continue to monitor care.

## 2020-06-01 NOTE — PROGRESS NOTES
Hospitalist Note Admit Date:  2020  4:04 PM  
Name:  Azucena Montero Age:  69 y.o. 
:  1945 MRN:  [id number] PCP:  Kirsty Murcia MD 
Treatment Team: Attending Provider: Juliet Hooper MD; Consulting Provider: Claudette Furbish, MD; Consulting Provider: Paige Ovalle MD; Consulting Provider: Rae Talamantes MD; Consulting Provider: Phoebe Smith MD; Utilization Review: Juan A Tracy RN; Care Manager: Duane Madura, RN; Occupational Therapist: Shaylee Matta HPI/Subjective:  
Azucena Montero is a 69 y.o. male with past medical history significant for DVT, HIT, A. fib, morbid obesity, osteomyelitis, CKD, hemodialysis, hypertension, sleep apnea and type 2 diabetes who was brought in by EMS after heaving a near syncopal episode at home. He was admitted to ICU due to acute respiratory failure, severe sepsis The patient had a recent surgery by Dr Pepper Siddiqui on may 15 for removal of toes due to gangrene. Blood cultures positive for MRSA and foot wound with MRSA, ESBL e coli, and enterococcus. ID and vascular surgery following, washout of foot on : 
Repeat cultures  NGTD Refused cpap last night No other acute events. Breathing feels well including when working with PT Had some slight orthostatic sx yesterday, now resolved off IV lasix. Objective:  
 
Patient Vitals for the past 24 hrs: 
 Temp Pulse Resp BP SpO2  
20 0834     93 % 20 0655 98.7 °F (37.1 °C) 82 20 168/89 95 % 20 0447 98.7 °F (37.1 °C) 90  146/83 96 % 20 0123     91 % 20 0003 98.5 °F (36.9 °C) 85 19 141/60 95 % 20 2047     91 % 20 2006 97.8 °F (36.6 °C) 86 20 138/73 95 % 20 1629 97.7 °F (36.5 °C) 81 18 145/85 92 % 20 1239 97.6 °F (36.4 °C) 84 19 141/79 91 % Oxygen Therapy O2 Sat (%): 93 % (20) Pulse via Oximetry: 80 beats per minute (20) O2 Device: Nasal cannula (06/01/20 0834) O2 Flow Rate (L/min): 4 l/min (06/01/20 0834) FIO2 (%): 36 % (05/31/20 0859) Estimated body mass index is 35.3 kg/m² as calculated from the following: 
  Height as of this encounter: 5' 10\" (1.778 m). Weight as of this encounter: 111.6 kg (246 lb). Intake/Output Summary (Last 24 hours) at 6/1/2020 1009 Last data filed at 6/1/2020 9394 Gross per 24 hour Intake 1128.33 ml Output 2400 ml Net -1271.67 ml  
   
*Note that automatically entered I/Os may not be accurate; dependent on patient compliance with collection and accurate  by techs. Physical Exam:  
General:     No distress Head:   normocephalic, atraumatic Eyes, Ears, nose: PERRL, EOMI. Normal conjunctiva Neck:    supple, non-tender. Trachea midline. Lungs:   Crackles bases Cardiac:   RRR, Normal S1 and S2. Abdomen:   Soft, non distended, nontender, +BS, no guarding/rebound Extremities:   Warm, dry. Trace pedal edema. Multiple toe amputations, dressing c/d/i Skin:   No rashes, no jaundice Neuro:  AAOx3. No gross focal neurological deficit Psychiatric:  No anxiety, calm, cooperative Data Review: 
I have reviewed all labs, meds, and studies from the last 24 hours: 
 
Recent Results (from the past 24 hour(s)) Manny Renae Collection Time: 05/31/20 10:11 AM  
Result Value Ref Range Vancomycin, random 17.8 UG/ML  
GLUCOSE, POC Collection Time: 05/31/20 11:29 AM  
Result Value Ref Range Glucose (POC) 174 (H) 65 - 100 mg/dL GLUCOSE, POC Collection Time: 05/31/20  4:04 PM  
Result Value Ref Range Glucose (POC) 220 (H) 65 - 100 mg/dL PLEASE READ & DOCUMENT PPD TEST IN 48 HRS Collection Time: 05/31/20  5:31 PM  
Result Value Ref Range PPD Negative Negative  
 mm Induration 0 0 - 5 mm GLUCOSE, POC Collection Time: 05/31/20  8:09 PM  
Result Value Ref Range Glucose (POC) 220 (H) 65 - 100 mg/dL GLUCOSE, POC  
 Collection Time: 06/01/20  6:57 AM  
Result Value Ref Range Glucose (POC) 107 (H) 65 - 100 mg/dL All Micro Results Procedure Component Value Units Date/Time CULTURE, BLOOD [[id number]] Collected:  05/28/20 1453 Order Status:  Completed Specimen:  Blood Updated:  06/01/20 2605 Special Requests: --     
  RIGHT 
HAND Culture result: NO GROWTH 4 DAYS     
 CULTURE, BLOOD [[id number]] Collected:  05/28/20 1500 Order Status:  Completed Specimen:  Blood Updated:  06/01/20 8773 Special Requests: --     
  LEFT 
HAND Culture result: NO GROWTH 4 DAYS     
 EMERGENT DISEASE PANEL [[id number]] Collected:  05/31/20 1758 Order Status:  Completed Updated:  05/31/20 1839 CULTURE, BLOOD [[id number]]  (Abnormal) Collected:  05/25/20 1543 Order Status:  Completed Specimen:  Blood Updated:  05/30/20 5703 Special Requests: --     
  RIGHT 
HAND 
  
  GRAM STAIN    
  GRAM POS COCCI IN CLUSTERS  
     
   AEROBIC BOTTLE POSITIVE RESULTS VERIFIED, PHONED TO AND READ BACK BY SHYAM Rosales [facility]. AT [address] [place] [address] ON 5.28.20   Culture result: STAPHYLOCOCCUS AUREUS     
   SENSITIVITY TO FOLLOW CULTURE, Biagio Holstein STAIN [[id number]]  (Abnormal)  (Susceptibility) Collected:  05/25/20 0200 Order Status:  Completed Specimen:  Wound from Foot Updated:  05/29/20 9370 Special Requests: NO SPECIAL REQUESTS     
  GRAM STAIN NO WBCS SEEN     
   NO EPITHELIAL CELLS SEEN     
      
  MODERATE GRAM POSITIVE COCCI MODERATE GRAM NEGATIVE RODS Culture result:    
  HEAVY ESCHERICHIA COLI ** (EXTENDED SPECTRUM BETA LACTAMASE ) ** MODERATE ENTEROCOCCUS FAECALIS GROUP D MODERATE * METHICILLIN RESISTANT STAPHYLOCOCCUS AUREUS *  
     
      
  ESBL CALLED TO AND CORRECTLY REPEATED BY: 
Belen Salamanca RN @ 2817 ON 5/28/20 AK. 
  
      
  ** MULTI-DRUG RESISTANT ORGANISM ** PATIENT IS A KNOWN MRSA SCANT MIXED SKIN BRENNAN ISOLATED  
     
 CULTURE, URINE [[id number]]  (Abnormal) Collected:  05/24/20 1900 Order Status:  Completed Specimen:  Cath Urine Updated:  05/28/20 0741 Special Requests: NO SPECIAL REQUESTS Culture result:    
  6000 COLONIES/mL SUKUMAR ALBICANS  
     
 CULTURE, BLOOD [[id number]]  (Abnormal) Collected:  05/25/20 1543 Order Status:  Completed Specimen:  Blood Updated:  05/28/20 6614 Special Requests: --     
  RIGHT Antecubital 
  
  GRAM STAIN GRAM POSITIVE COCCI AEROBIC BOTTLE POSITIVE RESULTS VERIFIED, PHONED TO AND READ BACK BY OLYA PERALTA @ 3553 5/27/20 MM Culture result: STAPHYLOCOCCUS AUREUS For Susceptibility Refer to Culture Accession [id number] CULTURE, BLOOD [[id number]]  (Abnormal) Collected:  05/24/20 1604 Order Status:  Completed Specimen:  Blood Updated:  05/28/20 1512 Special Requests: RIGHT ANTECUBITAL     
  GRAM STAIN    
  GRAM POS COCCI IN CLUSTERS  
     
      
  AEROBIC AND ANAEROBIC BOTTLES  
     
      
  CRITICAL RESULT NOT CALLED DUE TO PREVIOUS NOTIFICATION OF CRITICAL RESULT WITHIN THE LAST 24 HOURS. Culture result: STAPHYLOCOCCUS AUREUS For Susceptibility Refer to Culture Accession [id number] CULTURE, BLOOD [[id number]]  (Abnormal)  (Susceptibility) Collected:  05/24/20 1604 Order Status:  Completed Specimen:  Blood Updated:  05/28/20 9880 Special Requests: LEFT ANTECUBITAL     
  GRAM STAIN GRAM POSITIVE COCCI AEROBIC AND ANAEROBIC BOTTLES RESULTS VERIFIED, PHONED TO AND READ BACK BY Steffnaie Hidalgo @ 0874 5/25/20 MM Culture result:    
  * METHICILLIN RESISTANT STAPHYLOCOCCUS AUREUS * REFER TO BIOFIRE  PANEL ACC [id number]   RESULTS VERIFIED, PHONED TO AND READ BACK BY 
Claude Mings, RN ON 5/28/20 @8385, TA 
  
 BLOOD CULTURE ID PANEL [[id number]]  (Abnormal) Collected:  05/24/20 1604 Order Status:  Completed Specimen:  Blood Updated:  05/25/20 2593 Acc. no. from [employer] [id number] Staphylococcus Detected Staphylococcus aureus Detected Comment: RESULTS VERIFIED, PHONED TO AND READ BACK BY 
Joshua Funez RN @ 0697 ON 5/25/2020 AK. mecA (Methicillin-Resistance Genes) Detected Comment: Presence of mecA is highly indicative of methicillin resistance. The test does not replace traditional culture and susceptibilities RESULTS VERIFIED, PHONED TO AND READ BACK BY 
Joshua Funez RN @ 2336 ON 05/25/2020 AK. INTERPRETATION Gram positive cocci in clusters, identified by realtime PCR as SUSPECTED MRSA. Comment: Recommend IV vancomycin use, unlikely to be a contaminant. Infectious Diseases Consult recommended in adult patients. THIS TEST DOES NOT REPLACE SENSITIVITY TESTING. Current Meds: 
Current Facility-Administered Medications Medication Dose Route Frequency  polyethylene glycol (MIRALAX) packet 17 g  17 g Oral DAILY  bisacodyL (DULCOLAX) tablet 5 mg  5 mg Oral DAILY PRN  
 Vancomycin Intermittent Dosing   Other Rx Dosing/Monitoring  ondansetron (ZOFRAN) injection 4 mg  4 mg IntraVENous Q6H PRN  
 warfarin (COUMADIN) tablet 5 mg  5 mg Oral QPM  
 0.9% sodium chloride infusion 250 mL  250 mL IntraVENous PRN  
 lidocaine (XYLOCAINE) 10 mg/mL (1 %) injection 0.1 mL  0.1 mL SubCUTAneous PRN  
 sodium chloride (NS) flush 5-40 mL  5-40 mL IntraVENous Q8H  
 sodium chloride (NS) flush 5-40 mL  5-40 mL IntraVENous PRN  
 0.9% sodium chloride infusion 250 mL  250 mL IntraVENous PRN  
 ALPRAZolam (XANAX) tablet 0.5 mg  0.5 mg Oral BID PRN  
 famotidine (PEPCID) tablet 20 mg  20 mg Oral DAILY  insulin glargine (LANTUS) injection 25 Units  25 Units SubCUTAneous QHS  NUTRITIONAL SUPPORT ELECTROLYTE PRN ORDERS   Does Not Apply PRN  
  meropenem (MERREM) 500 mg in 0.9% sodium chloride (MBP/ADV) 50 mL  0.5 g IntraVENous Q8H  
 sodium chloride (NS) flush 5-40 mL  5-40 mL IntraVENous Q8H  
 sodium chloride (NS) flush 5-40 mL  5-40 mL IntraVENous PRN  
 acetaminophen (TYLENOL) tablet 650 mg  650 mg Oral Q4H PRN  
 albuterol (PROVENTIL VENTOLIN) nebulizer solution 2.5 mg  2.5 mg Nebulization Q6H RT  
 insulin lispro (HUMALOG) injection   SubCUTAneous AC&HS  
 buPROPion (WELLBUTRIN) tablet 75 mg  75 mg Oral BID  midodrine (PROAMATINE) tablet 10 mg  10 mg Oral TID  OLANZapine (ZyPREXA) tablet 5 mg  5 mg Oral QHS  mirtazapine (REMERON) tablet 15 mg  15 mg Oral QHS  sertraline (ZOLOFT) tablet 100 mg  100 mg Oral DAILY  tamsulosin (FLOMAX) capsule 0.4 mg  0.4 mg Oral DAILY Other Studies: 
 
Xr Foot Rt Ap/lat Result Date: 5/24/2020 Right foot CLINICAL INDICATION: Right foot swelling and erythema, recent toe amputations FINDINGS: Two views of the right foot show amputation across the MTP joints of the first through fourth toes. There are small bone fragments in the amputation plane with irregular soft tissue swelling and thickening noted distally at the level the amputation. There is irregularity along the bony cortices of the first, second, and third metatarsal heads. Osteomyelitis cannot be excluded by plain radiography. The fifth toe is unremarkable. IMPRESSION: Soft tissue swelling and irregularity along the amputation plane of the right first through fourth toes. Osteomyelitis is a consideration. Consider further evaluation with MRI to better evaluate for osteomyelitis. Xr Chest [facility] Result Date: 5/24/2020 Portable chest x-ray CLINICAL INDICATION: Dyspnea FINDINGS: Single AP view of the chest compared to a similar exam dated 5/15/2020 shows stable bilateral pulmonary edema pattern. A pacer device is in place. Postcardiac valve replacement changes evident. Small bilateral pleural effusions suspected. IMPRESSION: Acute CHF exacerbation. Assessment and Plan: Active Hospital Problems Diagnosis Date Noted  Pulmonary edema, noncardiac 05/25/2020  Osteomyelitis ([facility] 05/24/2020  DEJUAN (acute kidney injury) ([facility] 05/24/2020  PAD (peripheral artery disease) ([facility] 05/14/2020  Gangrene of toe of both feet ([facility] 05/11/2020  Pacemaker 04/27/2020 Dual chamber Biotronik pacemaker placed (1/24/20): AV block post op AVR.  Long term (current) use of anticoagulants 04/27/2020  DNR (do not resuscitate) 02/26/2020  Hypotension 02/18/2020  
 HIT (heparin-induced thrombocytopenia) ([facility]) 01/23/2020  Atrial fibrillation ([facility] 01/13/2020 Post op CABG.  Status post aortic valve replacement 01/10/2020 1. AVR (1/16/20):  25 mm Intuity Jon Gutierres Valve. 2.  Echo (4/12/20):  EF 55-60%. Normal functioning AVR. MG 15. PG 26. Mild MR.  
  
 S/P CABG x 3 01/10/2020  CAD (coronary artery disease) 01/10/2020 CABG (1/16/20):  LIMA to LAD. SVG to OM. SVG to PDA.  Type 2 diabetes with nephropathy ([facility] 08/26/2019  Obesity, morbid ([facility] 10/19/2018 Plan: 
 
Severe Sepsis with shock due to gangrene MRSA bacteremia, ESBL e coli R foot infection, possible osteomyelitis. S/p washout 5/27 
- Wound cx (5/24) shows ESBL e coli, MRSA, and enterococcus faecalis - Blood cultures with MRSA - TTE is neg for vegitation, ID requests TERRI 
- ID following: IV vancomycin, IV Meropenem (change to ertapenem at [place]) - BCx still positive as of 5/25, repeated 5/28 and NGTD Acute on chronic respiratory failure with hypoxia due to pulm edema and sepsis - Likely due to renal disease and volume resuscitation in sepsis. - improved s/p IV lasix 
- supplemental O2 (2-3L at baseline). Bipap at night. - cards and pulm following.   
- repeat XR today per pulm to see if needs tap for effusion, but may be improved enough on diuretics.  Restarting home lasix PO 
 
 CAD s/p CABG and PPM  
pAfib S/p AVR (1/2020) HLD 
- home midodrine for hypotension, florinef stopped - stable. Restart florinef if orthostatic again 
- coumadin anticoagulation Supratherapeutic INR, Anemia 
- INR greater than 9 initially, now therapeutic after holding and vit K 
- unclear bleeding source, but Hg dropped from 8 to 6.8.  
- Hg now stable after transfusion  
- goal 2-3 early bioprosthetic valve DEJUAN on CKD Stage 4 (baseline Cr. 1.5 - 1.8 in 5/2020) - SCr improved on lasix T2DM 
- lantus 25units qHS and ISS 
- diabetic diet BPH: c/w home meds Diet:  DIET DIABETIC CONSISTENT CARB 
DVT PPx: anticoagulated Code: DNR 
 
DISPO Expect SNF for PT and IVABx in the next few days. COVID screen. Signed By: Stevie Roth MD   
 June 1, 2020

## 2020-06-01 NOTE — PROGRESS NOTES
Patient remains IP. Patient to be reassigned to CTN/C after d/c.   CTN to resolve current episode and remove self from care team

## 2020-06-01 NOTE — PROGRESS NOTES
Problem: Self Care Deficits Care Plan (Adult) Goal: *Acute Goals and Plan of Care (Insert Text) Description: 1. Patient will complete lower body bathing and dressing with SBA and adaptive equipment as needed. 2. Patient will complete toileting and toilet transfer with SBA and adaptive equipment as needed. 3. Patient will complete standing ADL with good static and good dynamic standing balance. 4. Patient will complete bed mobility with Mod I and one verbal cue for placement of UE to assist. 
5. Patient will complete functional transfers with Supervision and adaptive equipment as needed. Timeframe: 7 visits Outcome: Progressing Towards Goal 
  
OCCUPATIONAL THERAPY: Initial Assessment, Daily Note and AM 6/1/2020 INPATIENT: OT Visit Days: 1 Payor: Guanakito Henriquez / Plan: Jose A Velarde / Product Type: [employer] /  
  
NAME/AGE/GENDER: Frances Galaviz is a 69 y.o. male PRIMARY DIAGNOSIS:  Severe sepsis ([facility] [A41.9, R65.20] Lactic acidosis [E87.2] Acute CHF (congestive heart failure) ([address] [I50.9] Osteomyelitis ([address] [M86.9] Hypotension [I95.9] Severe sepsis with septic shock (HCC) Severe sepsis with septic shock ([address] Procedure(s) (LRB): 
RIGHT FOOT DEBRIDEMENT/ ROOM 608 (Right) 5 Days Post-Op ICD-10: Treatment Diagnosis:  
 · Generalized Muscle Weakness (M62.81) · Difficulty in walking, Not elsewhere classified (R26.2) · Other abnormalities of gait and mobility (R26.89) Precautions/Allergies: 
  Heel WBing R LE Heparin; Amoxicillin; and Keflex [cephalexin] ASSESSMENT:  
 
Mr. Ish Hale is a 69 y.o. male admitted for above diagnoses and is s/p R foot debridement and now in Heel WBing R LE. At baseline, patient lives with wife in one story home with 2 ZACHARIAH. Wife home and available 24/7 if needed. Pt is typically Mod I to independent for performance of ADLs and IADLs.  Pt reports he has been using w/c for in home ambulation and has not been leaving the house. Pt is on 2L home O2. Pt was seen by this therapist in February following admission for Acute respiratory failure and was using no DME at that time and able to walk approximately 200 feet at baseline. Ernestina Paget found supine in bed breathing 4L O2. Alert and oriented x 4 and motivated to work with therapy. O2 assessed throughout session and above 90%. Reports no pain prior to or following functional mobility. Pt requires SBA for supine to sit. Seated edge of bed pt with intact static and dynamic seated balance. BUE WFL for ROM and coordination with strength generally decreased yet functional. Pt reminded of Heel WBing status in R LE and requires SBA to scoot to edge of bed. Pt required Moderate verbal and visual cueing, as well as Min A and use of RW for sit to stand. Upon standing, pt with fair static and dynamic standing balance. Pt appears to have improved adherence to WB status compared to initial evaluation performed by PT. Per PT note, off-loading shoe has been ordered to assist with increased adherence for pt adherence to WB status. Off-loading shoe was not present in room at this time. Pt required CGA with increased time and minimal cueing for WB status to walk from bed to bathroom door and bathroom door to recliner with pt reporting increased fatigue and need to rest. Pt required CGA for stand to sit. Following 2 minute rest break, pt with increased motivation to perform in room mobility to increase activity tolerance for standing ADLs. Pt required CGA with use of RW and increased time to complete sit to stand. and to walk from recliner chair to back wall. Pt with good adherence to WB status and required CGA with use of RW to return to recliner chair and complete stand to sit. Pt observed to have soiled sarah and required CGA for sit to stand and stand to sit following placement of clean sarah.  Pt left seated in recliner chair. Reports feeling slightly off. Bp assessed and 126/71. CNA in room to proceed with remaining vitals. All needs met and within reach. RN notified. Gabi Paredes is currently functioning below baseline for ADLs and functional mobility and would benefit from acute OT to increase independence and safety with ADLs and functional mobility. Will follow for duration of hospital stay to address the above goals. Patient was educated on role and plan of care of occupational therapy. This section established at most recent assessment PROBLEM LIST (Impairments causing functional limitations): 1. Decreased Strength 2. Decreased ADL/Functional Activities 3. Decreased Transfer Abilities 4. Decreased Ambulation Ability/Technique 5. Decreased Balance 6. Decreased Activity Tolerance 7. Increased Fatigue 8. Decreased Knowledge of Precautions INTERVENTIONS PLANNED: (Benefits and precautions of occupational therapy have been discussed with the patient.) 1. Activities of daily living training 2. Adaptive equipment training 3. Balance training 4. Clothing management 5. Neuromuscular re-eduation 6. Therapeutic activity 7. Therapeutic exercise TREATMENT PLAN: Frequency/Duration: Follow patient 3x/week to address above goals. Rehabilitation Potential For Stated Goals: Good REHAB RECOMMENDATIONS (at time of discharge pending progress):   
Placement: It is my opinion, based on this patient's performance to date, that Mr. Jose G Preston may benefit from participating in 1-2 additional therapy sessions in order to continue to assess for rehab potential and then make recommendation for disposition at discharge. [facility] Equipment: ? TBD  
    
 
 
 
OCCUPATIONAL PROFILE AND HISTORY:  
History of Present Injury/Illness (Reason for Referral): 
See H & P Past Medical History/Comorbidities:  
Mr. Jose G Preston  has a past medical history of Acute renal failure on dialysis ([facility] (2/25/2020), Arthritis, BPH (benign prostatic hyperplasia) (1/14/2013), CAD (coronary artery disease), DM type 2 (diabetes mellitus, type 2) ([facility] (dx 2004), Dyspnea, Gout (1/14/2013), HLD (hyperlipidemia) (1/14/2013), HTN (hypertension), Morbid obesity ([facility] (9/3/14), Oxygen dependent, Psychiatric disorder, Rheumatic fever, Seasonal allergic rhinitis, Severe aortic valve stenosis, Thyroid disease, and Unspecified sleep apnea (2016). Mr. Queenie Turner  has a past surgical history that includes hx tonsil and adenoidectomy; ir insert tunl cvc w port over 5 years (2/4/2020); hx heart catheterization (12/23/2019); vascular surgery procedure unlist; hx orthopaedic (Left); and hx cataract removal (Bilateral, 2012). Social History/Living Environment:  
Home Environment: Private residence # Steps to Enter: 2 One/Two Story Residence: One story Living Alone: No 
Support Systems: Spouse/Significant Other/Partner Patient Expects to be Discharged to[de-identified] Private residence Current DME Used/Available at Home: Oxygen, portable, Wheelchair, Nebulizer, Walker, [address] [facility] [address] chair, Commode, bedside Prior Level of Function/Work/Activity: At baseline, patient lives with wife in one story home with 2 ZACHARIAH. Wife home and available 24/7 if needed. Pt is typically Mod I to independent for performance of ADLs and IADLs. Pt reports he has been using w/c for in home ambulation and has not been leaving the house. Pt is on 2L home O2. Pt was seen by this therapist in February following admission for Acute respiratory failure and was using no DME at that time and able to walk approximately 200 feet at baseline. Personal Factors:   
      Sex:  male Age:  69 y.o. Number of Personal Factors/Comorbidities that affect the Plan of Care: Extensive review of physical, cognitive, and psychosocial performance (3+):  HIGH COMPLEXITY ASSESSMENT OF OCCUPATIONAL PERFORMANCE[de-identified]  
Activities of Daily Living: Basic ADLs (From Assessment) Complex ADLs (From Assessment) Feeding: Independent Oral Facial Hygiene/Grooming: Stand-by assistance, Setup Bathing: Moderate assistance Upper Body Dressing: Minimum assistance Lower Body Dressing: Moderate assistance Toileting: Moderate assistance Instrumental ADL Meal Preparation: Total assistance Homemaking: Total assistance Grooming/Bathing/Dressing Activities of Daily Living Bed/Mat Mobility Supine to Sit: Stand-by assistance Sit to Stand: Contact guard assistance;Minimum assistance Stand to Sit: Contact guard assistance Bed to Chair: Contact guard assistance Scooting: Stand-by assistance Most Recent Physical Functioning:  
Gross Assessment: 
AROM: Within functional limits Strength: Generally decreased, functional 
Coordination: Within functional limits Posture: 
Posture (WDL): Exceptions to [facility] Posture Assessment: Forward head, Rounded shoulders Balance: 
Sitting: Intact Standing: Impaired Standing - Static: Fair Standing - Dynamic : Fair Bed Mobility: 
Supine to Sit: Stand-by assistance Scooting: Stand-by assistance Wheelchair Mobility: 
  
Transfers: 
Sit to Stand: Contact guard assistance;Minimum assistance Stand to Sit: Contact guard assistance Bed to Chair: Contact guard assistance Patient Vitals for the past 6 hrs: 
 BP BP Patient Position SpO2 O2 Flow Rate (L/min) Pulse 06/01/20 0655 168/89 At rest 95 %  82  
06/01/20 0834   93 % 4 l/min   
06/01/20 1043 126/71 At rest 92 %  87 Mental Status Neurologic State: Alert Orientation Level: Oriented X4 Cognition: Follows commands Safety/Judgement: Fall prevention, Insight into deficits Physical Skills Involved: 
1. Balance 2. Strength 3. Activity Tolerance 4.  Gross Motor Control Cognitive Skills Affected (resulting in the inability to perform in a timely and safe manner): 
 1. None noted Psychosocial Skills Affected: 1. Habits/Routines 2. Environmental Adaptation Number of elements that affect the Plan of Care: 5+:  HIGH COMPLEXITY CLINICAL DECISION MAKIN[address] [place] [address] AM-PAC 6 Clicks Daily Activity Inpatient Short Form How much help from another person does the patient currently need. .. Total A Lot A Little None 1. Putting on and taking off regular lower body clothing? [] 1   [x] 2   [] 3   [] 4  
2. Bathing (including washing, rinsing, drying)? [] 1   [x] 2   [] 3   [] 4  
3. Toileting, which includes using toilet, bedpan or urinal?   [] 1   [x] 2   [] 3   [] 4  
4. Putting on and taking off regular upper body clothing? [] 1   [] 2   [x] 3   [] 4  
5. Taking care of personal grooming such as brushing teeth? [] 1   [] 2   [x] 3   [] 4  
6. Eating meals? [] 1   [] 2   [] 3   [x] 4  
© , Trustees of [address] [place] [address], under license to [employer]. All rights reserved Score:  Initial: 16, completed, 2020 Most Recent: X (Date: -- ) Interpretation of Tool:  Represents activities that are increasingly more difficult (i.e. Bed mobility, Transfers, Gait). Medical Necessity:    
· Skilled intervention continues to be required due to decreased independence, safety, balance, strength, and activity tolerance for performance of ADLs and functional mobility. Arielle Nunes Reason for Services/Other Comments: 
· Patient continues to require skilled intervention due to medical complications and patient unable to attend/participate in therapy as expected. Use of outcome tool(s) and clinical judgement create a POC that gives a: LOW COMPLEXITY  
 
 
 
TREATMENT:  
(In addition to Assessment/Re-Assessment sessions the following treatments were rendered) Pre-treatment Symptoms/Complaints: \"I feel like I'm here but really here. \" Pt response to therapy session. Pain: Initial:  
Pain Intensity 1: 0  Post Session:  Unchanged Therapeutic Activity: (24 minutes): Therapeutic activities including Bed transfers and Chair transfers to improve mobility, strength, balance and coordination. Required minimal to moderate verbal, visual, and manual cues to promote static and dynamic balance in standing. Braces/Orthotics/Lines/Etc:  
· brown catheter · O2 Device: Nasal cannula Treatment/Session Assessment:   
· Response to Treatment:  Pt tolerated treatment very well. Increased participation from pt this admission compared to prior admission in February. · Interdisciplinary Collaboration:  
o Occupational Therapist 
o Registered Nurse · After treatment position/precautions:  
o Up in chair 
o Bed/Chair-wheels locked 
o Bed in low position 
o Call light within reach 
o RN notified 
o CNA adjacent to chair to take vitals. · Compliance with Program/Exercises: Compliant most of the time, Will assess as treatment progresses. · Recommendations/Intent for next treatment session: \"Next visit will focus on advancements to more challenging activities and reduction in assistance provided\". Total Treatment Duration: OT Patient Time In/Time Out Time In: 1010 Time Out: 6410 JAYDEN Garcia/BG

## 2020-06-01 NOTE — PROGRESS NOTES
05/31/20 2047 Oxygen Therapy O2 Sat (%) 91 % Pulse via Oximetry 87 beats per minute O2 Device Nasal cannula O2 Flow Rate (L/min) 4 l/min CPAP/BIPAP  
CPAP/BIPAP Start/Stop Off 
(pt refused)

## 2020-06-01 NOTE — PROGRESS NOTES
Patient states he needs a walker to ambulate and is unstable walking, PT is needed to ambulate. Patient SpO2 88% on RA.

## 2020-06-01 NOTE — PROGRESS NOTES
Sonia Eid Admission Date: 5/24/2020 Daily Progress Note: 6/1/2020 Here with sepsis/ osteomyelitis and bilateral gangrenous toe. Septic shock improved, IV abx given and transferred to the floor. Vascular surgery consulted and debridement on 5/27. CXR consistent with acute pulmonary edema/CHF.  Has preserved LVEF on recent ECHO, hx of pA.fib. Given IV Lasix. Was admitted to ICU on BIPAP, DNR confirmed with severe sepsis, DEJUAN , presumed CHF and concern for osteomyelitis source with bilateral malodorous feet. Continued on Vancomycin, consulted intensivist, ID, cardiology, vascular, wound care. Was recently discharged by Dr. Claudia Haywood, vascular surgery service May 15 following right fourth left second and third toe amputations secondary to gangrene. Was discharged home on Midrin and Florinef. He is morbidly obesity with chronic 2-3 L home O2, bilateral carotid stenosis with CAD s/p CABG with AVR 1/10/20 by Dr. Shivani Ortiz complicated by CKD requiring HD, HIT +, DVTs, wound left thigh and pneumonia, PVD, DM with diabetic nephropathy. Chronic anticoagulation with warfarin for paroxysmal A. Fib. Subjective:  
 
Resting comfortably. No distress. Review of Systems Cardiovascular: positive for ESTRELLA Current Facility-Administered Medications Medication Dose Route Frequency  polyethylene glycol (MIRALAX) packet 17 g  17 g Oral DAILY  bisacodyL (DULCOLAX) tablet 5 mg  5 mg Oral DAILY PRN  
 Vancomycin Intermittent Dosing   Other Rx Dosing/Monitoring  ondansetron (ZOFRAN) injection 4 mg  4 mg IntraVENous Q6H PRN  
 warfarin (COUMADIN) tablet 5 mg  5 mg Oral QPM  
 0.9% sodium chloride infusion 250 mL  250 mL IntraVENous PRN  
 lidocaine (XYLOCAINE) 10 mg/mL (1 %) injection 0.1 mL  0.1 mL SubCUTAneous PRN  
 sodium chloride (NS) flush 5-40 mL  5-40 mL IntraVENous Q8H  
 sodium chloride (NS) flush 5-40 mL  5-40 mL IntraVENous PRN  
  0.9% sodium chloride infusion 250 mL  250 mL IntraVENous PRN  
 ALPRAZolam (XANAX) tablet 0.5 mg  0.5 mg Oral BID PRN  
 famotidine (PEPCID) tablet 20 mg  20 mg Oral DAILY  insulin glargine (LANTUS) injection 25 Units  25 Units SubCUTAneous QHS  NUTRITIONAL SUPPORT ELECTROLYTE PRN ORDERS   Does Not Apply PRN  
 meropenem (MERREM) 500 mg in 0.9% sodium chloride (MBP/ADV) 50 mL  0.5 g IntraVENous Q8H  
 sodium chloride (NS) flush 5-40 mL  5-40 mL IntraVENous Q8H  
 sodium chloride (NS) flush 5-40 mL  5-40 mL IntraVENous PRN  
 acetaminophen (TYLENOL) tablet 650 mg  650 mg Oral Q4H PRN  
 albuterol (PROVENTIL VENTOLIN) nebulizer solution 2.5 mg  2.5 mg Nebulization Q6H RT  
 insulin lispro (HUMALOG) injection   SubCUTAneous AC&HS  
 buPROPion (WELLBUTRIN) tablet 75 mg  75 mg Oral BID  midodrine (PROAMATINE) tablet 10 mg  10 mg Oral TID  OLANZapine (ZyPREXA) tablet 5 mg  5 mg Oral QHS  mirtazapine (REMERON) tablet 15 mg  15 mg Oral QHS  sertraline (ZOLOFT) tablet 100 mg  100 mg Oral DAILY  tamsulosin (FLOMAX) capsule 0.4 mg  0.4 mg Oral DAILY Objective:  
 
Vitals:  
 06/01/20 6277 06/01/20 2327 06/01/20 0834 06/01/20 1043 BP: 146/83 168/89  126/71 Pulse: 90 82  87 Resp:  20  20 Temp: 98.7 °F (37.1 °C) 98.7 °F (37.1 °C)  98.5 °F (36.9 °C) SpO2: 96% 95% 93% 92% Weight:      
Height:      
 
Intake and Output:  
05/30 1901 - 06/01 0700 In: 1128.3 [P.O.:650; I.V.:478.3] Out: 2900 [Urine:2900] 06/01 0701 - 06/01 1900 In: 480 [P.O.:480] Out: 500 [Urine:500] Intake/Output Summary (Last 24 hours) at 6/1/2020 1329 Last data filed at 6/1/2020 1327 Gross per 24 hour Intake 1608.33 ml Output 2900 ml Net -1291.67 ml Physical Exam:          
Constitutional: the patient is obese on 4 lpm 
HEENT: Sclera clear, pupils equal, oral mucosa moist 
Lungs: crackles in left base on 4 lpm 
Cardiovascular: IRR without M,G,R, PM 
 Abd/GI: soft and non-tender; with positive bowel sounds. rounded Ext: warm without cyanosis. There is no lower leg edema. Musculoskeletal: moves all four extremities with equal strength, right toe wound Skin: no jaundice or rashes, toe on right foot with + wound Neuro: A & O X 3 Lines/Drains: PIV,  brown Nutrition: ADA CHEST XRAY: 
 
5/27 LAB Recent Labs 05/31/20 
9565 05/30/20 
5812 WBC 12.4* 12.9* HGB 9.9* 10.2* HCT 32.0* 32.0*  
 274 Recent Labs 05/31/20 
4506 05/30/20 
3773  138  
K 3.9 3.9 CL 96* 98  
CO2 35* 35* * 113* BUN 39* 36* CREA 1.78* 1.48 INR 1.3 1.2 Assessment:  
 
Hospital Problems  Date Reviewed: 5/27/2020 Codes Class Noted POA Pulmonary edema, noncardiac ICD-10-CM: J81.1 ICD-9-CM: 473  5/25/2020 Yes Osteomyelitis ([facility] ICD-10-CM: M86.9 ICD-9-CM: 730.20  5/24/2020 Yes DEJUAN (acute kidney injury) ([facility] ICD-10-CM: N17.9 ICD-9-CM: 584.9  5/24/2020 Yes PAD (peripheral artery disease) (HCC) (Chronic) ICD-10-CM: I73.9 ICD-9-CM: 443.9  5/14/2020 Yes Gangrene of toe of both feet ([facility] ICD-10-CM: F71 
ICD-9-CM: 785.4  5/11/2020 Yes Long term (current) use of anticoagulants (Chronic) ICD-10-CM: Z79.01 
ICD-9-CM: V58.61  4/27/2020 Yes Pacemaker (Chronic) ICD-10-CM: Z95.0 ICD-9-CM: V45.01  4/27/2020 Yes Overview Signed 5/6/2020  9:46 PM by Joe Treviño MD  
  Dual chamber Biotronik pacemaker placed (1/24/20): AV block post op AVR. 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 
ICD-9-CM: 427.31  1/13/2020 Yes Overview Signed 5/6/2020  9:46 PM by Mery Lilly MD  
  Post op CABG. CAD (coronary artery disease) (Chronic) ICD-10-CM: I25.10 ICD-9-CM: 414.00  1/10/2020 Yes Overview Signed 5/6/2020  1:19 PM by Mery Lilly MD  
  CABG (1/16/20):  LIMA to LAD. SVG to OM. SVG to PDA. S/P CABG x 3 (Chronic) ICD-10-CM: Z95.1 ICD-9-CM: V45.81  1/10/2020 Yes Status post aortic valve replacement (Chronic) ICD-10-CM: Z95.2 ICD-9-CM: V43.3  1/10/2020 Yes Overview Addendum 5/6/2020  9:43 PM by Mery Lilly MD  
  1. AVR (1/16/20):  25 mm Intuity Jon Gutierres Valve. 2.  Echo (4/12/20):  EF 55-60%. Normal functioning AVR. MG 15. PG 26. Mild MR. 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 
ICD-9-CM: 250.40, 583.81  8/26/2019 Yes Obesity, morbid ([address] (Chronic) ICD-10-CM: E66.01 
ICD-9-CM: 278.01  10/19/2018 Yes PLAN:  
-- back on coumadin 5 mg Q HS 
-- off lasix,  B/P okay on midodrine. Cr up to 1.7 
-- CXR today, on coumadin with INR 1.3 5/31. ? enough fluid there now that lasix stopped. -- merrem, vancomycin 
-- mobilize, PT/OT- recommending rehab 
-- Assess O2 needs -- BiPAP here, he has a home machine he just needs to use it SINAI Byrnes I have spoken with and examined the patient. I agree with the above assessment and plan as documented. Gen: pleasant, napping on/off Lungs:  CTA anteriorly Heart:  RRR with no Murmur/Rubs/Gallops Abd:NTND Ext: no edema Ultrasound L side today to assess for effusion. Will perform thoracentesis if large effusion. Note has been present on CXR for some time. On meropenem/vanco per ID Continue nightly BiPAP Daniel Irene MD

## 2020-06-01 NOTE — PROGRESS NOTES
Bilateral Ultra Sound done of the chest, pictures taken, placed in chart. Not enough fluid to warrant thoracentesis per Dr. Jaymie Reyes.

## 2020-06-01 NOTE — PROGRESS NOTES
Transfer of care. Report received from off going RN at bedside. Patient is awake, lying in bed while watching TV, with even and unlabored respirations noted on RA. Oriented to staff and plan of care. Patient is alert and oriented x 4. Patient is S/P hypotension + sepsis. No needs voiced at this moment. Assessment to follow, see for details.

## 2020-06-01 NOTE — PROGRESS NOTES
Problem: Falls - Risk of 
Goal: *Absence of Falls Description: Document Sabas Bennett Fall Risk and appropriate interventions in the flowsheet. Outcome: Progressing Towards Goal 
Note: Fall Risk Interventions: 
Mobility Interventions: Assess mobility with egress test, Bed/chair exit alarm, Communicate number of staff needed for ambulation/transfer Medication Interventions: Assess postural VS orthostatic hypotension, Bed/chair exit alarm, Evaluate medications/consider consulting pharmacy Elimination Interventions: Bed/chair exit alarm, Call light in reach, Elevated toilet seat History of Falls Interventions: Bed/chair exit alarm, Consult care management for discharge planning, Door open when patient unattended Problem: Patient Education: Go to Patient Education Activity Goal: Patient/Family Education Outcome: Progressing Towards Goal 
  
Problem: Pressure Injury - Risk of 
Goal: *Prevention of pressure injury Description: Document Alfredo Scale and appropriate interventions in the flowsheet. Outcome: Progressing Towards Goal 
Note: Pressure Injury Interventions: 
Sensory Interventions: Assess changes in LOC, Assess need for specialty bed, Avoid rigorous massage over bony prominences Moisture Interventions: Absorbent underpads, Apply protective barrier, creams and emollients, Assess need for specialty bed Activity Interventions: Assess need for specialty bed, Chair cushion, Increase time out of bed Mobility Interventions: Assess need for specialty bed, Chair cushion, Float heels Nutrition Interventions: Document food/fluid/supplement intake, Discuss nutritional consult with provider, Offer support with meals,snacks and hydration Friction and Shear Interventions: Apply protective barrier, creams and emollients, Feet elevated on foot rest, Foam dressings/transparent film/skin sealants Problem: Patient Education: Go to Patient Education Activity Goal: Patient/Family Education Outcome: Progressing Towards Goal 
  
Problem: Diabetes Self-Management Goal: *Disease process and treatment process Description: Define diabetes and identify own type of diabetes; list 3 options for treating diabetes. Outcome: Progressing Towards Goal 
Goal: *Incorporating nutritional management into lifestyle Description: Describe effect of type, amount and timing of food on blood glucose; list 3 methods for planning meals. Outcome: Progressing Towards Goal 
Goal: *Incorporating physical activity into lifestyle Description: State effect of exercise on blood glucose levels. Outcome: Progressing Towards Goal 
Goal: *Developing strategies to promote health/change behavior Description: Define the ABC's of diabetes; identify appropriate screenings, schedule and personal plan for screenings. Outcome: Progressing Towards Goal 
Goal: *Using medications safely Description: State effect of diabetes medications on diabetes; name diabetes medication taking, action and side effects. Outcome: Progressing Towards Goal 
Goal: *Monitoring blood glucose, interpreting and using results Description: Identify recommended blood glucose targets  and personal targets. Outcome: Progressing Towards Goal 
Goal: *Prevention, detection, treatment of acute complications Description: List symptoms of hyper- and hypoglycemia; describe how to treat low blood sugar and actions for lowering  high blood glucose level. Outcome: Progressing Towards Goal 
Goal: *Prevention, detection and treatment of chronic complications Description: Define the natural course of diabetes and describe the relationship of blood glucose levels to long term complications of diabetes. Outcome: Progressing Towards Goal 
Goal: *Developing strategies to address psychosocial issues Description: Describe feelings about living with diabetes; identify support needed and support network Outcome: Progressing Towards Goal 
 Goal: *Insulin pump training Outcome: Progressing Towards Goal 
Goal: *Sick day guidelines Outcome: Progressing Towards Goal 
Goal: *Patient Specific Goal (EDIT GOAL, INSERT TEXT) Outcome: Progressing Towards Goal 
  
Problem: Patient Education: Go to Patient Education Activity Goal: Patient/Family Education Outcome: Progressing Towards Goal 
  
Problem: Patient Education: Go to Patient Education Activity Goal: Patient/Family Education Outcome: Progressing Towards Goal 
  
Problem: Sepsis: Day of Diagnosis Goal: Off Pathway (Use only if patient is Off Pathway) Outcome: Progressing Towards Goal 
Goal: *Fluid resuscitation Outcome: Progressing Towards Goal 
Goal: *Paired blood cultures prior to first dose of antibiotic Outcome: Progressing Towards Goal 
Goal: *First dose of  appropriate antibiotic within 3 hours of arrival to ED, within 1 hour of arrival to ICU Outcome: Progressing Towards Goal 
Goal: *Lactic acid with first set of blood cultures Outcome: Progressing Towards Goal 
Goal: *Pneumococcal immunization (if eligible) Outcome: Progressing Towards Goal 
Goal: *Influenza immunization (if eligible) Outcome: Progressing Towards Goal 
Goal: Activity/Safety Outcome: Progressing Towards Goal 
Goal: Consults, if ordered Outcome: Progressing Towards Goal 
Goal: Diagnostic Test/Procedures Outcome: Progressing Towards Goal 
Goal: Nutrition/Diet Outcome: Progressing Towards Goal 
Goal: Discharge Planning Outcome: Progressing Towards Goal 
Goal: Medications Outcome: Progressing Towards Goal 
Goal: Respiratory Outcome: Progressing Towards Goal 
Goal: Treatments/Interventions/Procedures Outcome: Progressing Towards Goal 
Goal: Psychosocial 
Outcome: Progressing Towards Goal 
  
Problem: Risk for Spread of Infection Goal: Prevent transmission of infectious organism to others Description: Prevent the transmission of infectious organisms to other patients, staff members, and visitors. Outcome: Progressing Towards Goal 
  
Problem: Patient Education:  Go to Education Activity Goal: Patient/Family Education Outcome: Progressing Towards Goal 
  
Problem: Patient Education: Go to Patient Education Activity Goal: Patient/Family Education Outcome: Progressing Towards Goal

## 2020-06-01 NOTE — PROGRESS NOTES
[address], [place]. [address] [facility] FAX: [phone] Vascular Surgery Progress Note Admit Date: 2020 POD 5 Days Post-Op Procedure:  Procedure(s): RIGHT FOOT DEBRIDEMENT/ ROOM 608 Subjective:  
 
Patient has no new complaints. Objective:  
 
Blood pressure 126/71, pulse 87, temperature 98.5 °F (36.9 °C), resp. rate 20, height 5' 10\" (1.778 m), weight 246 lb (111.6 kg), SpO2 92 %. Temp (24hrs), Av.2 °F (36.8 °C), Min:97.6 °F (36.4 °C), Max:98.7 °F (37.1 °C) Physical Exam:  GENERAL: alert, cooperative, no distress, appears stated age, LUNG:  diminished in the bases. , HEART:  regular rate and rhythm, S1, S2 normal, no murmur, click, rub or gallop and EXTREMITIES:  Right Lower Extremity:  mild edema, granulation tissue at the great toe amputation site. sutures in palce in the remaining incision line. and Left Lower Extremity:  dry gangrene to left distal end of the great toe. sutures in place to left 2nd-3rd toes Labs:  
Recent Labs 20 
4032 HGB 9.9* WBC 12.4*  
K 3.9 * Data Review: images and reports reviewed Current Facility-Administered Medications Medication Dose Route Frequency  polyethylene glycol (MIRALAX) packet 17 g  17 g Oral DAILY  bisacodyL (DULCOLAX) tablet 5 mg  5 mg Oral DAILY PRN  
 Vancomycin Intermittent Dosing   Other Rx Dosing/Monitoring  ondansetron (ZOFRAN) injection 4 mg  4 mg IntraVENous Q6H PRN  
 warfarin (COUMADIN) tablet 5 mg  5 mg Oral QPM  
 0.9% sodium chloride infusion 250 mL  250 mL IntraVENous PRN  
 lidocaine (XYLOCAINE) 10 mg/mL (1 %) injection 0.1 mL  0.1 mL SubCUTAneous PRN  
 sodium chloride (NS) flush 5-40 mL  5-40 mL IntraVENous Q8H  
 sodium chloride (NS) flush 5-40 mL  5-40 mL IntraVENous PRN  
 0.9% sodium chloride infusion 250 mL  250 mL IntraVENous PRN  
 ALPRAZolam (XANAX) tablet 0.5 mg  0.5 mg Oral BID PRN  
  famotidine (PEPCID) tablet 20 mg  20 mg Oral DAILY  insulin glargine (LANTUS) injection 25 Units  25 Units SubCUTAneous QHS  NUTRITIONAL SUPPORT ELECTROLYTE PRN ORDERS   Does Not Apply PRN  
 meropenem (MERREM) 500 mg in 0.9% sodium chloride (MBP/ADV) 50 mL  0.5 g IntraVENous Q8H  
 sodium chloride (NS) flush 5-40 mL  5-40 mL IntraVENous Q8H  
 sodium chloride (NS) flush 5-40 mL  5-40 mL IntraVENous PRN  
 acetaminophen (TYLENOL) tablet 650 mg  650 mg Oral Q4H PRN  
 albuterol (PROVENTIL VENTOLIN) nebulizer solution 2.5 mg  2.5 mg Nebulization Q6H RT  
 insulin lispro (HUMALOG) injection   SubCUTAneous AC&HS  
 buPROPion (WELLBUTRIN) tablet 75 mg  75 mg Oral BID  midodrine (PROAMATINE) tablet 10 mg  10 mg Oral TID  OLANZapine (ZyPREXA) tablet 5 mg  5 mg Oral QHS  mirtazapine (REMERON) tablet 15 mg  15 mg Oral QHS  sertraline (ZOLOFT) tablet 100 mg  100 mg Oral DAILY  tamsulosin (FLOMAX) capsule 0.4 mg  0.4 mg Oral DAILY Assessment:  
 
Active Problems: 
  Obesity, morbid ([facility] (10/19/2018) Type 2 diabetes with nephropathy ([facility] (8/26/2019) CAD (coronary artery disease) (1/10/2020) Overview: CABG (1/16/20):  LIMA to LAD. SVG to OM. SVG to PDA. S/P CABG x 3 (1/10/2020) Status post aortic valve replacement (1/10/2020) Overview: 1. AVR (1/16/20):  25 mm Intuity Jon Gutierres Valve. 2.  Echo (4/12/20):  EF 55-60%. Normal functioning AVR. MG 15. PG 26. Mild MR. Atrial fibrillation ([facility] (1/13/2020) Overview: Post op CABG. HIT (heparin-induced thrombocytopenia) ([facility] (1/23/2020) Hypotension (2/18/2020) DNR (do not resuscitate) (2/26/2020) Long term (current) use of anticoagulants (4/27/2020) Pacemaker (4/27/2020) Overview: Dual chamber Biotronik pacemaker placed (1/24/20): AV block post op AVR. Gangrene of toe of both feet ([address] (5/11/2020) PAD (peripheral artery disease) ([facility] (5/14/2020) Osteomyelitis ([facility] (5/24/2020) DEJUAN (acute kidney injury) ([facility] (5/24/2020) Pulmonary edema, noncardiac (5/25/2020) Plan/Recommendations/Medical Decision Making:  
 
Continue present treatment  
-Wound care-Right foot toe amputation site: clean with wound cleanser or saline. Pack opening with [employer] ag rope and cover with dry dressing. Change daily. 
-Left toe amputation sites. Clean with soap and water and/or wound cleanser daily. -PT/OT 
-ok to d/c when medically stable from a vascular standpoint Signed By: Navin Babin NP   
 June 1, 2020

## 2020-06-01 NOTE — PROGRESS NOTES
Warfarin dosing per pharmacist 
 
Ahmet Sessions is a 69 y.o. male. Height: 5' 10\" (177.8 cm)    Weight: 111.6 kg (246 lb) Indication:  Prevention of thromboembolism (prosthetic heart valves) Goal INR:  2-3 Home dose:  7.5 mg MWF, 5 mg all other days Risk factors or significant drug interactions:  None Other anticoagulants:  N/A *History of HIT Daily Monitoring Date  INR     Warfarin dose HGB              Notes 5/25  7.4  Hold  8.5 2.5 mg PO Vit K admin 5/26  >9.9  Hold  7.0 10 mg PO Vit K admin 5/27  2.4  Hold  6.8        1 unit PRBC transfused 5/28  2  Hold  7.6  1 unit PRBC transfused 5/29  1.8  5 mg  9.5 5/30  1.2  5 mg   10.2 
5/31  1.3  5 mg   9.9 6/1  1.7  5 mg  -- 
 
Patient with labile INRs since admission, increased to > 9.9 on 5/26. INR down to 2 after 2 doses of Vit K given total this admission. INR slowly increasing. Will give another 5 mg of warfarin this evening. Pharmacy will continue to follow patient and monitor INR daily. Thank you, Yovani Michelle, PharmD, BCPS Clinical Pharmacy Specialist 
[phone]

## 2020-06-01 NOTE — PROGRESS NOTES
Shift Summary Hourly rounds performed on pt, pt resting in bed quietly with head the bed elevated and eyes cosed. Patient remained on oxygen 4 L via NC. Patient did not C/O pain, this shift. Patient remained alert and?oriented x 4. Right AC and left forearm IV sites remained patent. Breathing treatment administered this shift. No distress noted. Fall precautions observed, bed low and locked with exit alarm turn on, call light within patient reach. Turner cath in place draining clear yellow urine via gravity. No concern voiced this shift, afebrile, VSS, great output and 0 BM noted and charted. See chart. No acute distress noted.

## 2020-06-01 NOTE — PROGRESS NOTES
Infectious Disease Progress Note Today's Date: 2020 Admit Date: 2020 Impression: · MRSA bacteremia  (, ): source would appear to be R foot. Local changes, / suggest soft tissue infection and removal HD access seems too early. TTE  not suggestive of endocarditis. · R foot infection s/p earlier amputations for gangrene. Site cx   ESBL E Coli, E Faecalis, MRSA. S/p debridement () · PVD with recent amputations gangrenous toes on both right and left foots. X-ray with bony changes on R but given recent surgery unclear this represents OM. If has amputation/BKA then will not be an issue for treatment duration. · He reports PCN allergy as feeling strange after IM PCN injection when he was in [facility] and Keflex and rash. Overall doubt these are major allergies. · History of AVR and pacemaker Plan:  
· Continue Vancomycin--Duration 6 weeks minimum (EOT ). Continue Meropenem (change to ertapenem for outpt ease) for 4 weeks( )  to treat ESBL E coli. · Follow repeat BCs. If persistent, he will NEED TERRI. · Dispo: undetermined Anti-infectives:  
Vancomycin - Clindamycin - Levaquin-->Edd Subjective: No issues. COVID pending (ordered for SNF placement). Vanc Random 17 Review of Systems:  A comprehensive review of systems was negative except for that written in the History of Present Illness. Objective:  
 
Visit Vitals /89 (BP 1 Location: Left arm, BP Patient Position: At rest) Pulse 82 Temp 98.7 °F (37.1 °C) Resp 20 Ht 5' 10\" (1.778 m) Wt 111.6 kg (246 lb) SpO2 95% BMI 35.30 kg/m² Temp (24hrs), Av.1 °F (36.7 °C), Min:97.5 °F (36.4 °C), Max:98.7 °F (37.1 °C) No changes from my previous exam  Lines:  kevin Physical Exam: Exam:   
 General: NC/AT, alert and cooperative, looks stated age, in NAD HEENT: eyes closed Neck: supple, symmetric Cardiac: 
 Pulmonary: Abdomen: soft,non-distended + BS Skin: No rashes MSK:no enlarged or swollen joints in limbs Extremities: R foot see below, mild drainage from GT amput site. L GT with dry gangrene--no cellulitis Psychiatric: mood and affect appropriate to situation Data Review: CBC:  
Recent Labs 05/31/20 
0258 05/30/20 
4026 WBC 12.4* 12.9*  
RBC 3.59* 3.64* HGB 9.9* 10.2* HCT 32.0* 32.0*  
 274 CMP:  
Recent Labs 05/31/20 
2793 05/30/20 
1101 * 113*  138  
K 3.9 3.9 CL 96* 98  
CO2 35* 35* BUN 39* 36* CREA 1.78* 1.48  
CA 8.6 8.3 AGAP 5* 5* Liver Enzymes: No results for input(s): TP, ALB, TBIL, AP in the last 72 hours. No lab exists for component: SGOT, GPT, DBIL Microbiology:  
 
All Micro Results Procedure Component Value Units Date/Time CULTURE, BLOOD [[id number]] Collected:  05/28/20 1453 Order Status:  Completed Specimen:  Blood Updated:  06/01/20 3273 Special Requests: --     
  RIGHT 
HAND Culture result: NO GROWTH 4 DAYS     
 CULTURE, BLOOD [[id number]] Collected:  05/28/20 1500 Order Status:  Completed Specimen:  Blood Updated:  06/01/20 1503 Special Requests: --     
  LEFT 
HAND Culture result: NO GROWTH 4 DAYS     
 EMERGENT DISEASE PANEL [[id number]] Collected:  05/31/20 1758 Order Status:  Completed Updated:  05/31/20 1839 CULTURE, BLOOD [[id number]]  (Abnormal) Collected:  05/25/20 1543 Order Status:  Completed Specimen:  Blood Updated:  05/30/20 1304 Special Requests: --     
  RIGHT 
HAND 
  
  GRAM STAIN    
  GRAM POS COCCI IN CLUSTERS  
     
   AEROBIC BOTTLE POSITIVE RESULTS VERIFIED, PHONED TO AND READ BACK BY SHYAM Carvalho 15. AT [facility] ON 5.28.20   Culture result: STAPHYLOCOCCUS AUREUS     
   SENSITIVITY TO FOLLOW CULTURE, Mateus Merl STAIN [[id number]]  (Abnormal)  (Susceptibility) Collected:  05/25/20 0200 Order Status:  Completed Specimen:  Wound from Foot Updated:  05/29/20 0416 Special Requests: NO SPECIAL REQUESTS     
  GRAM STAIN NO WBCS SEEN     
   NO EPITHELIAL CELLS SEEN     
      
  MODERATE GRAM POSITIVE COCCI MODERATE GRAM NEGATIVE RODS Culture result:    
  HEAVY ESCHERICHIA COLI ** (EXTENDED SPECTRUM BETA LACTAMASE ) ** MODERATE ENTEROCOCCUS FAECALIS GROUP D MODERATE * METHICILLIN RESISTANT STAPHYLOCOCCUS AUREUS *  
     
      
  ESBL CALLED TO AND CORRECTLY REPEATED BY: 
Ben Staton RN @ 3936 ON 5/28/20 AK. 
  
      
  ** MULTI-DRUG RESISTANT ORGANISM ** PATIENT IS A KNOWN MRSA SCANT MIXED SKIN BRENNAN ISOLATED  
     
 CULTURE, URINE [[id number]]  (Abnormal) Collected:  05/24/20 1900 Order Status:  Completed Specimen:  Cath Urine Updated:  05/28/20 0741 Special Requests: NO SPECIAL REQUESTS Culture result:    
  6000 COLONIES/mL SUKUMAR ALBICANS  
     
 CULTURE, BLOOD [[id number]]  (Abnormal) Collected:  05/25/20 1543 Order Status:  Completed Specimen:  Blood Updated:  05/28/20 4446 Special Requests: --     
  RIGHT Antecubital 
  
  GRAM STAIN GRAM POSITIVE COCCI AEROBIC BOTTLE POSITIVE RESULTS VERIFIED, PHONED TO AND READ BACK BY FABIANRN @ 1382 5/27/20 MM Culture result: STAPHYLOCOCCUS AUREUS For Susceptibility Refer to Culture Accession [id number] CULTURE, BLOOD [[id number]]  (Abnormal) Collected:  05/24/20 1604 Order Status:  Completed Specimen:  Blood Updated:  05/28/20 0611 Special Requests: RIGHT ANTECUBITAL     
  GRAM STAIN    
  GRAM POS COCCI IN CLUSTERS  
     
      
  AEROBIC AND ANAEROBIC BOTTLES  
     
      
  CRITICAL RESULT NOT CALLED DUE TO PREVIOUS NOTIFICATION OF CRITICAL RESULT WITHIN THE LAST 24 HOURS.   
     
  Culture result: STAPHYLOCOCCUS AUREUS     
      
 For Susceptibility Refer to Culture Accession [id number] CULTURE, BLOOD [[id number]]  (Abnormal)  (Susceptibility) Collected:  05/24/20 1604 Order Status:  Completed Specimen:  Blood Updated:  05/28/20 6718 Special Requests: LEFT ANTECUBITAL     
  GRAM STAIN GRAM POSITIVE COCCI AEROBIC AND ANAEROBIC BOTTLES RESULTS VERIFIED, PHONED TO AND READ BACK BY Marimar Sandhu @ 0503 5/25/20 MM Culture result:    
  * METHICILLIN RESISTANT STAPHYLOCOCCUS AUREUS * REFER TO BIOFIRE  PANEL ACC [id number] RESULTS VERIFIED, PHONED TO AND READ BACK BY 
Román Moreira RN ON 5/28/20 @0732, TA 
  
 BLOOD CULTURE ID PANEL [[id number]]  (Abnormal) Collected:  05/24/20 1604 Order Status:  Completed Specimen:  Blood Updated:  05/25/20 0467 Acc. no. from [employer] [id number] Staphylococcus Detected Staphylococcus aureus Detected Comment: RESULTS VERIFIED, PHONED TO AND READ BACK BY 
Rain Lindquist RN @ 0509 ON 5/25/2020 AK. mecA (Methicillin-Resistance Genes) Detected Comment: Presence of mecA is highly indicative of methicillin resistance. The test does not replace traditional culture and susceptibilities RESULTS VERIFIED, PHONED TO AND READ BACK BY 
Rain Lindquist RN @ 8753 ON 05/25/2020 AK. INTERPRETATION Gram positive cocci in clusters, identified by realtime PCR as SUSPECTED MRSA. Comment: Recommend IV vancomycin use, unlikely to be a contaminant. Infectious Diseases Consult recommended in adult patients. THIS TEST DOES NOT REPLACE SENSITIVITY TESTING. Imaging: No new Signed By: Benjamin Azar NP   
 June 1, 2020

## 2020-06-01 NOTE — PROGRESS NOTES
Nutrition Assessment for:  
Length of Stay Assessment: Admitted with severe sepsis with shock secondary to gangrene, a/c resp fx, DEJUAN/CKD. Past medical history significant for DVT, HIT, A. fib, morbid obesity, osteomyelitis, CKD, hemodialysis, hypertension, sleep apnea and type 2 diabetes. S/p debridement R foot, thoracentesis today. Pt sleeping at RD attempts to visit times two, upon third attempt staff preparing for thoracentesis. Pt known to nutrition services, hx of compromised intake and accepts Nepro. RN reports pt is eating, minimal data recorded. DIET DIABETIC CONSISTENT CARB Regular Insufficient intake data. Unable to assess if needs are met. Defer assessment until more data available. Anthropometrics: 
Height: 5' 10\" (177.8 cm), Weight: 111.6 kg (246 lb), Weight Source: Bed, Body mass index is 35.3 kg/m². BMI class of obese. BMI limited validation secondary to fluid status. Macronutrient needs: (using IBW (Ideal body weight) 73 kg) EER: 3953-3573 kcal/day (25-30 kcal/kg) EPR: 88-95 g/day (1.2-1.3 g/kg) Nutrition Diagnosis: 
Increased nutrient needs (kcal and protein) related to HD as evidenced by metabolic demand. . 
 
 
Nutrition Intervention: 
Meals and snacks: Continue current diet. Medical food supplement therapy: Nepro BID. Coordination of nutrition care by a Nutrition Professional: Discussed with Praneeth Trent RN. [employer] with Dr. Mary Alvarez: Nutrition protocol implemented. Discharge Nutrition Plan: Too soon to determine. [place], [address], [facility]

## 2020-06-01 NOTE — PROGRESS NOTES
Pharmacokinetic Consult to Pharmacist 
 
Gay Musa is a 69 y.o. male being treated for SSTI-  with Vanc. Height: 5' 10\" (177.8 cm)  Weight: 111.6 kg (246 lb) Lab Results Component Value Date/Time BUN 39 (H) 05/31/2020 07:06 AM  
 Creatinine 1.78 (H) 05/31/2020 07:06 AM  
 WBC 12.4 (H) 05/31/2020 07:06 AM  
 Procalcitonin 8.17 05/24/2020 04:04 PM  
 Lactic acid 1.6 05/25/2020 01:08 AM  
  
Estimated Creatinine Clearance: 46.2 mL/min (A) (based on SCr of 1.78 mg/dL (H)). CULTURES: 
5/25 - Blood - 1/4 vials GPC 
5/25 - Wound - GNR and GPC 
5/24 - Urine - NGTD 
5/24 - Blood x 2 - 4/4 vials MRSA Lab Results Component Value Date/Time Vancomycin,trough 25.2 ([facility]) 05/29/2020 09:59 AM  
 Vancomycin, random 16.7 06/01/2020 12:53 PM  
 
 
Day 9 of vancomycin. Goal trough is 15-20mcg/ml. Vancomycin random level 16.7 today, ~24 hours post dose. Will schedule 1000 mg every 24 hours for now. Will continue to follow patient. Thank you, Alfred Salazar, PharmD, [facility] Clinical Pharmacy Specialist 
[phone]

## 2020-06-02 NOTE — PROGRESS NOTES
Progress Note Patient: Ernestina Paget MRN: [id number]  SSN: [id number] YOB: 1945  Age: 69 y.o. Sex: male Admit Date: 5/24/2020 LOS: 9 days Subjective: F/U MRSA bacteremia, osteomyelitis 70 yo hx of DVT, HIT, a fib, morbid obesity on chronic 2-3L NC at home, sleep apnea, osteomyelitis, CKD stage III baseline creatine 2.4, s/p aortic valve replacement with bioprosthetic valve and CABG January of 2020, paroxysmal a fib on warfarin, chronic gangrenous toes, PVD, pacemaker, depression, carotid stenosis who presented to the ED for cc near syncope. Patient found to be in severe sepsis, pulmonary edema, DEJUAN, along with malodorous foot wounds. Recently had removal of right 4th and left 2nd and 3rd digits on May 15th by Dr. Villegas. Blood cultures growing MRSA. Wound cultures growing e faecalis, e coli, and MRSA. Repeat blood cultures on 5/28 no growth. COVID negative. Pulmonology also following for his pulmonary edema. ID following. Glucose levels elevated in AM but level 98 this AM. No chest pain or SOB. States he is \"death walking\" INR 1.7,  
Current Facility-Administered Medications Medication Dose Route Frequency  vancomycin (VANCOCIN) 1,000 mg in 0.9% sodium chloride (MBP/ADV) 250 mL  1,000 mg IntraVENous Q24H  
 furosemide (LASIX) tablet 40 mg  40 mg Oral DAILY  polyethylene glycol (MIRALAX) packet 17 g  17 g Oral DAILY  bisacodyL (DULCOLAX) tablet 5 mg  5 mg Oral DAILY PRN  
 ondansetron (ZOFRAN) injection 4 mg  4 mg IntraVENous Q6H PRN  
 warfarin (COUMADIN) tablet 5 mg  5 mg Oral QPM  
 0.9% sodium chloride infusion 250 mL  250 mL IntraVENous PRN  
 lidocaine (XYLOCAINE) 10 mg/mL (1 %) injection 0.1 mL  0.1 mL SubCUTAneous PRN  
 sodium chloride (NS) flush 5-40 mL  5-40 mL IntraVENous Q8H  
 sodium chloride (NS) flush 5-40 mL  5-40 mL IntraVENous PRN  
 0.9% sodium chloride infusion 250 mL  250 mL IntraVENous PRN  
  ALPRAZolam (XANAX) tablet 0.5 mg  0.5 mg Oral BID PRN  
 famotidine (PEPCID) tablet 20 mg  20 mg Oral DAILY  insulin glargine (LANTUS) injection 25 Units  25 Units SubCUTAneous QHS  NUTRITIONAL SUPPORT ELECTROLYTE PRN ORDERS   Does Not Apply PRN  
 meropenem (MERREM) 500 mg in 0.9% sodium chloride (MBP/ADV) 50 mL  0.5 g IntraVENous Q8H  
 sodium chloride (NS) flush 5-40 mL  5-40 mL IntraVENous Q8H  
 sodium chloride (NS) flush 5-40 mL  5-40 mL IntraVENous PRN  
 acetaminophen (TYLENOL) tablet 650 mg  650 mg Oral Q4H PRN  
 albuterol (PROVENTIL VENTOLIN) nebulizer solution 2.5 mg  2.5 mg Nebulization Q6H RT  
 insulin lispro (HUMALOG) injection   SubCUTAneous AC&HS  
 buPROPion (WELLBUTRIN) tablet 75 mg  75 mg Oral BID  midodrine (PROAMATINE) tablet 10 mg  10 mg Oral TID  OLANZapine (ZyPREXA) tablet 5 mg  5 mg Oral QHS  mirtazapine (REMERON) tablet 15 mg  15 mg Oral QHS  sertraline (ZOLOFT) tablet 100 mg  100 mg Oral DAILY  tamsulosin (FLOMAX) capsule 0.4 mg  0.4 mg Oral DAILY Objective:  
 
Vitals:  
 06/02/20 0015 06/02/20 7544 06/02/20 4794 06/02/20 1026 BP: 128/69 124/71 149/73 105/52 Pulse: 82  70 85 Resp:   20 Temp: 98.2 °F (36.8 °C) 98.3 °F (36.8 °C) 98.4 °F (36.9 °C) SpO2: 99% 99% 98% Weight:      
Height:      
  
  
Intake and Output: 
Current Shift: No intake/output data recorded. Last three shifts: 05/31 1901 - 06/02 0700 In: 1608.3 [P.O.:1130; I.V.:478.3] Out: 1700 [Urine:1700] Physical Exam:  
General:  Alert, cooperative, no distress, irritable. Anxious. Eyes:  Conjunctivae/corneas clear. Twitching Ears:  Normal TMs and external ear canals both ears. Nose: Nares normal. Septum midline. Mouth/Throat: Lips, mucosa, and tongue normal. Teeth and gums normal.  
Neck:  no JVD. Back:   deferred Lungs:   Clear to auscultation bilaterally.   
Heart:  Regular rate and rhythm, S1, S2 normal  
 Abdomen:   Soft, non-tender. Bowel sounds normal. Morbidly obese. Extremities: Right foot is wrapped with clean dressing. Left foot with stiches to recent amputation of 2nd and 3rd digits. No active drainage noted. Left dry gangrenous toe. Pulses: 2+ and symmetric all extremities. Skin: As above Lymph nodes: Cervical, supraclavicular, and axillary nodes normal.  
Neurologic: CNII-XII intact. Lab/Data Review: 
 
Recent Results (from the past 24 hour(s)) GLUCOSE, POC Collection Time: 06/01/20 10:47 AM  
Result Value Ref Range Glucose (POC) 238 (H) 65 - 100 mg/dL Macy Rasp Collection Time: 06/01/20 12:53 PM  
Result Value Ref Range Vancomycin, random 16.7 UG/ML  
PROTHROMBIN TIME + INR Collection Time: 06/01/20 12:53 PM  
Result Value Ref Range Prothrombin time 20.4 (H) 12.0 - 14.7 sec INR 1.7 GLUCOSE, POC Collection Time: 06/01/20  3:51 PM  
Result Value Ref Range Glucose (POC) 238 (H) 65 - 100 mg/dL GLUCOSE, POC Collection Time: 06/01/20  7:54 PM  
Result Value Ref Range Glucose (POC) 227 (H) 65 - 100 mg/dL GLUCOSE, POC Collection Time: 06/02/20  6:54 AM  
Result Value Ref Range Glucose (POC) 98 65 - 100 mg/dL Assessment/ Plan: Active Problems: 
  Obesity, morbid ([facility] (10/19/2018) Type 2 diabetes with nephropathy ([facility] (8/26/2019) CAD (coronary artery disease) (1/10/2020) Overview: CABG (1/16/20):  LIMA to LAD. SVG to OM. SVG to PDA. S/P CABG x 3 (1/10/2020) Status post aortic valve replacement (1/10/2020) Overview: 1. AVR (1/16/20):  25 mm Intuity Jon Gutierres Valve. 2.  Echo (4/12/20):  EF 55-60%. Normal functioning AVR. MG 15. PG 26. Mild MR. Atrial fibrillation ([facility] (1/13/2020) Overview: Post op CABG. HIT (heparin-induced thrombocytopenia) ([facility] (1/23/2020) Hypotension (2/18/2020) DNR (do not resuscitate) (2/26/2020) Long term (current) use of anticoagulants (4/27/2020) Pacemaker (4/27/2020) Overview: Dual chamber Biotronik pacemaker placed (1/24/20): AV block post op AVR. Gangrene of toe of both feet ([address] (5/11/2020) PAD (peripheral artery disease) ([address] (5/14/2020) Osteomyelitis ([address] (5/24/2020) DEJUAN (acute kidney injury) ([address] (5/24/2020) Pulmonary edema, noncardiac (5/25/2020) MRSA bacteremia - From foot wounds. TTE showed no vegetations but since MRSA, cardiology has been consulted for TERRI to ensure no involvement on pacemaker or valve involvement. Plan for Vancomycin for minimum EOT 7/9. Also on meropenem since enterococcus and e coli from wound cultures. Right foot infection - s/p debridement on 5/27. Wound care following. May need amputation once infection has cleared. Will need to follow up with Dr. Raya Mendez as outpatient. As of now when reading notes, patient did not want further surgery DM type II  - A1C 9.6. Non compliant. Lantus 25 units, SS. Paroxysmal a fib - Warfarin. Daily INR. INR pending for today. Depression - Wellbutrin. Olanzapine. Zoloft. Midodrine Lasix he is taking daily but unsure as to why. BMP I will reorder for tomorrow. May need to stop lasix tomorrow if creatine worsening and no indication for lasix use. ECHO showed EF 55%, RV pressure 25-30mmHg, DVT prophylaxis - Warfarin. Signed By: Alix Gil DO   
 June 2, 2020

## 2020-06-02 NOTE — PROGRESS NOTES
SW followed up with [facility] liaison, Mark Navarro, who informed she will initiate [place]ka precert today. Awaiting insurance authorization and negative COVID test before patient can DC to [facility].

## 2020-06-02 NOTE — ADVANCED PRACTICE NURSE
Quick Progress Note/Addition Current Problem/Change in condition: [employer] representative contacted for interrogation of patients ppm tomorrow morning. Instructions for given for finding the patients degree of PPM dependence. Report will be placed on the chart for review.    
 
Isabella Martinez NP 
06/02/20 
4:02 PM

## 2020-06-02 NOTE — PROGRESS NOTES
Problem: Mobility Impaired (Adult and Pediatric) Goal: *Acute Goals and Plan of Care (Insert Text) Description: LTG: 
(1.)Mr. Vania Beltrán will move from supine to sit and sit to supine , scoot up and down and roll side to side in bed with MODIFIED INDEPENDENCE within 7 treatment day(s). (2.)Mr. Vania Beltrán will transfer from bed to chair and chair to bed with MODIFIED INDEPENDENCE using the least restrictive device maintaining heel WBing R LE within 7 treatment day(s). (3.)Mr. Vania Beltrán will ambulate with MODIFIED INDEPENDENCE for 50+ feet maintaining heel WBing R LE with the least restrictive device within 7 treatment day(s). ________________________________________________________________________________________________ Outcome: Progressing Towards Goal 
  
PHYSICAL THERAPY: Daily Note and AM 6/2/2020 INPATIENT: PT Visit Days : 2 Heel WBing R LE 
Payor: Cheri Escalera / Plan: Juno Huff / Product Type: Managed Care Medicare /   
  
NAME/AGE/GENDER: Huy Alberts is a 69 y.o. male PRIMARY DIAGNOSIS: Severe sepsis ([address] [A41.9, R65.20] Lactic acidosis [E87.2] Acute CHF (congestive heart failure) ([address] [I50.9] Osteomyelitis ([address] [M86.9] Hypotension [I95.9] Severe sepsis with septic shock (HCC) Severe sepsis with septic shock ([address] Procedure(s) (LRB): ULTRASOUND (Bilateral) 1 Day Post-Op ICD-10: Treatment Diagnosis:  
 · Other abnormalities of gait and mobility (R26.89) Precaution/Allergies: 
Heparin; Amoxicillin; and Keflex [cephalexin] ASSESSMENT:  
 
Mr. Vania Beltrán recently (5/27/20) underwent debridement of R forefoot by vasculature surgery and needs to be heel WBing per chart. Patient reports that he ambulates with RW independently in home but does require assistance with sit to supine to lift his LE's onto the bed. Patient was supine upon contact and agreeable to PT.  Patient performs supine to sit with SBA and cues for technique. Patient transfers to standing with min assist and cues for hand placement. Once standing patient ambulates 20' with rolling walker, cues for heel weight bearing on RLE, and cues for sequencing/posture. Patient has difficulty maintaining heel weight bearing needing increased cues. Patient sits in recliner chair where he then participates in therapeutic strengthening exercises to improve functional strength for transfers, gait and overall mobility. Patient requires cues to perform exercises correctly. Overall slow progress towards physical therapy goals. Patient's goals listed above are still appropriate. Will continue skilled PT to address remaining deficits. This section established at most recent assessment PROBLEM LIST (Impairments causing functional limitations): 1. Decreased Transfer Abilities 2. Decreased Ambulation Ability/Technique 3. Decreased Balance 4. Decreased Activity Tolerance 5. Decreased [place] with Home Exercise Program 
 INTERVENTIONS PLANNED: (Benefits and precautions of physical therapy have been discussed with the patient.) 1. Balance Exercise 2. Bed Mobility 3. Gait Training 4. Therapeutic Activites 5. Therapeutic Exercise/Strengthening 6. Transfer Training 7. education TREATMENT PLAN: Frequency/Duration: 3 times a week for duration of hospital stay Rehabilitation Potential For Stated Goals: Good REHAB RECOMMENDATIONS (at time of discharge pending progress):   
Placement: It is my opinion, based on this patient's performance to date, that Mr. Jonas Ventura may benefit from intensive therapy at a [address] [place] [address] after discharge due to the functional deficits listed above that are likely to improve with skilled rehabilitation and concerns that he/she may be unsafe to be unsupervised at home due to decreased mobility. Equipment:  
? Off-loading shoe.    
    
 
 
 
HISTORY:  
 History of Present Injury/Illness (Reason for Referral): 
Per MD note, \"Chief complaint: Chills, dyspnea, fever, hypotension HPI: Morbidly obese patient on chronic 2-3 L home O2 recently discharged from this hospital by Dr. Stanton Hayward vascular surgery service May 15 following right fourth left second and third toe amputations secondary to gangrene. Patient had aortic valve replacement with a bioprosthetic aortic valve and postop was noted to have gangrenous toes and has significant peripheral vascular disease. Longstanding diabetes with diabetic nephropathy chronic kidney disease stage III with May 8 serum creatinine being 1.5. Chronic anticoagulation with warfarin for paroxysmal A. fib and recently aortic valve replacement but bioprosthetic. He has a permanent pacemaker, status post coronary artery bypass graft, bilateral carotid stenosis, and recent diagnosis of heparin-induced thrombocytopenia. He did see cardiology Friday2 days ago and claims medications were changed but he cannot tell me which ones. He cannot tell me the dose of his warfarin. I do have a discharge summary and reviewed MAR. He apparently was on the toilet and had near syncope, EMS was notified. Patient reports symptoms of dyspnea dyspnea on exertion chills and EMS reported documented fever but I could not locate documentation. On arrival to emergency department he is found to have a brain natruretic peptide of 4134 with recent prior being 2600 May 15. And chest radiographic appearance of bilateral alveolar edema consistent with acute pulmonary edema/CHF. He has preserved LVEF on his recent echocardiogram to evaluate his bioprosthetic aortic valve. His EKG is read as sinus tach although P waves are not easily identified his rhythm is regular. He has a history of paroxysmal A. fib chronically. His serum lactate level is 3 he has acute kidney injury with serum creatinine 2.0 his glucose is 200. His hemoglobin is 8 white blood count is 15,000 with left shift. His ABG is not bad with pH of 7.37 PCO2 of 47 PaO2 of 67 on 3 L 92% but clinical decompensation since that time according to nursing in the ER. He is currently unable to speak complete sentences and pulse ox is below 90 on nasal cannula and blood pressure is borderline. I spoke with ER nurses and Turner catheter and 40 mg IV Lasix to be ordered. He is being admitted to the intensive care unit with severe sepsis with lactic acidosis and acute kidney injury likely osteomyelitis source has bilateral feet are malodorous. Imaging studies consistent with possible osteomyelitis. He also has acute pulmonary edema and borderline hypotension. He will be admitted to the unit despite DNR status due to requested use of BiPAP if needed, and possible pressor support. For now we will continue vancomycin as single agent but will seek opinion of intensivist and will consult ID, cardiology, vascular, wound care. Pharmacy will manage warfarinstat PT INR pending. Other significant lab data includes procalcitonin level at 8. He was not tested for COVID-19difficult to obtain adequate history due to patient respiratory distress at time of my evaluation. It is noted that he was discharged with Midrin as well as Florinef. Florinef will obviously be held but we may try Midrin to avoid need for pressor support. Will await evaluation from intensivist/critical care. Call placed\" Past Medical History/Comorbidities:  
Mr. Vidal Chang  has a past medical history of Acute renal failure on dialysis [facility]) (2/25/2020), Arthritis, BPH (benign prostatic hyperplasia) (1/14/2013), CAD (coronary artery disease), DM type 2 (diabetes mellitus, type 2) ([facility] (dx 2004), Dyspnea, Gout (1/14/2013), HLD (hyperlipidemia) (1/14/2013), HTN (hypertension), Morbid obesity ([facility] (9/3/14), Oxygen dependent, Psychiatric disorder, Rheumatic fever, Seasonal allergic rhinitis, Severe aortic valve stenosis, Thyroid disease, and Unspecified sleep apnea (2016). Mr. Luz Maria Farmer  has a past surgical history that includes hx tonsil and adenoidectomy; ir insert tunl cvc w port over 5 years (2/4/2020); hx heart catheterization (12/23/2019); vascular surgery procedure unlist; hx orthopaedic (Left); and hx cataract removal (Bilateral, 2012). Social History/Living Environment:  
Home Environment: Private residence # Steps to Enter: 2 One/Two Story Residence: One story Living Alone: No 
Support Systems: Spouse/Significant Other/Partner Patient Expects to be Discharged to[de-identified] Private residence Current DME Used/Available at Home: Oxygen, portable, Wheelchair, Nebulizer, Walker, [address] chair, Commode, bedside Prior Level of Function/Work/Activity: He recently (5/27/20) underwent debridement of R forefoot by vasculature surgery and needs to be heel WBing per chart. Patient reports that he ambulates with RW independently in home but does require assistance with sit to supine to lift his LE's onto the bed. Number of Personal Factors/Comorbidities that affect the Plan of Care: 3+: HIGH COMPLEXITY EXAMINATION:  
Most Recent Physical Functioning:  
Gross Assessment: 
  
         
  
Posture: 
  
Balance: 
Sitting: Intact Standing: Impaired; With support Standing - Static: Fair Standing - Dynamic : Fair Bed Mobility: 
Supine to Sit: Stand-by assistance Wheelchair Mobility: 
  
Transfers: 
Sit to Stand: Minimum assistance Stand to Sit: Contact guard assistance Gait: 
Right Side Weight Bearing: Heel Base of Support: Center of gravity altered Speed/Federica: Slow Gait Abnormalities: Decreased step clearance;Trunk sway increased; Shuffling gait; Step to gait Distance (ft): 20 Feet (ft) Assistive Device: Walker, rolling;Gait belt Ambulation - Level of Assistance: Contact guard assistance Interventions: Safety awareness training; Tactile cues; Verbal cues Body Structures Involved: 1. Heart 2. Bones 3. Joints 4. Muscles Body Functions Affected: 1. Cardio 2. Movement Related 3. Skin Related Activities and Participation Affected: 1. Mobility 2. Self Care 3. Domestic Life Number of elements that affect the Plan of Care: 4+: HIGH COMPLEXITY CLINICAL PRESENTATION:  
Presentation: Evolving clinical presentation with changing clinical characteristics: MODERATE COMPLEXITY CLINICAL DECISION MAKIN[address] [place] [address] AM-PAC 6 Clicks Basic Mobility Inpatient Short Form How much difficulty does the patient currently have. .. Unable A Lot A Little None 1. Turning over in bed (including adjusting bedclothes, sheets and blankets)? [] 1   [] 2   [x] 3   [] 4  
2. Sitting down on and standing up from a chair with arms ( e.g., wheelchair, bedside commode, etc.)   [] 1   [] 2   [x] 3   [] 4  
3. Moving from lying on back to sitting on the side of the bed? [] 1   [] 2   [x] 3   [] 4 How much help from another person does the patient currently need. .. Total A Lot A Little None 4. Moving to and from a bed to a chair (including a wheelchair)? [] 1   [] 2   [x] 3   [] 4  
5. Need to walk in hospital room? [] 1   [x] 2   [] 3   [] 4  
6. Climbing 3-5 steps with a railing? [] 1   [x] 2   [] 3   [] 4  
© , Trustees of [address] [place] [address], under license to [employer]. All rights reserved Score:  Initial: 16 Most Recent: X (Date: -- ) Interpretation of Tool:  Represents activities that are increasingly more difficult (i.e. Bed mobility, Transfers, Gait). Medical Necessity:    
· Patient is expected to demonstrate progress in  
· functional technique and activity tolerance ·  to  
· increase independence with   and improve safety during all functional mobility · . Reason for Services/Other Comments: 
· Patient continues to require skilled intervention due to · medical complications and patient unable to attend/participate in therapy as expected · . Use of outcome tool(s) and clinical judgement create a POC that gives a: Clear prediction of patient's progress: LOW COMPLEXITY  
  
 
 
 
TREATMENT:  
(In addition to Assessment/Re-Assessment sessions the following treatments were rendered) Pre-treatment Symptoms/Complaints:  none. Pain: Initial:  
Pain Intensity 1: 0  Post Session:  0 Therapeutic Activity: (    13 Minutes): Therapeutic activities including bed mobility training, transfer training, ambulation on level ground, static/dynamic standing balance, scooting, posture training, instruction in heel weight bearing and sequencing with rolling walker, and patient education to improve mobility, strength and balance. Required moderate Safety awareness training; Tactile cues; Verbal cues to promote static and dynamic balance in standing and promote coordination of bilateral, lower extremity(s). Therapeutic Exercise: ( 10 minutes):  Exercises per grid below to improve mobility and strength. Required minimal visual and verbal cues to promote proper body alignment. Progressed complexity of movement as indicated. DATE: 5/30/20 6/2/20 Ambulation Hip Flexion X20 AB 2x15B A Long Arc Quads X20 AB 2x15B A Hip ab/ad  2x15B A Heel Raises X20 AB 2x15B A Toe Raises X20 AB 2x15B A Key:  A=active, AA=active assisted, P=passive, B=bilaterally, R=right, L=left DF=dorsiflexion, PF=plantarflexion Braces/Orthotics/Lines/Etc:  
· O2 Device: Hi flow nasal cannula Treatment/Session Assessment:   
· Response to Treatment: see above · Interdisciplinary Collaboration:  
o Physical Therapy Assistant 
o Registered Nurse 
o Physician 
o  · After treatment position/precautions:  
o Up in chair 
o Bed/Chair-wheels locked 
o Call light within reach 
o RN notified · Compliance with Program/Exercises: Compliant all of the time, Will assess as treatment progresses · Recommendations/Intent for next treatment session: \"Next visit will focus on advancements to more challenging activities and reduction in assistance provided\". Total Treatment Duration: PT Patient Time In/Time Out Time In: 1422 Time Out: 7949 Orin García, PTA

## 2020-06-02 NOTE — PROGRESS NOTES
CM received call from patient's son Kenji Zapata [phone], who requested updates in reference to the patient obtaining STR. CM informed [facility] is awaiting insurance authorization and negative COVID test before patient can discharge to SNF. Kenji Zapata was receptive to this information and requested CM to provide update, when insurance has provided approval. CM was receptive to this request. CM continues to monitor.

## 2020-06-02 NOTE — PROGRESS NOTES
Clarissa Santos Admission Date: 5/24/2020 Daily Progress Note: 6/2/2020 Here with sepsis/ osteomyelitis and bilateral gangrenous toe. Septic shock improved, IV abx given and transferred to the floor. Vascular surgery consulted and debridement on 5/27. CXR consistent with acute pulmonary edema/CHF.  Has preserved LVEF on recent ECHO, hx of pA.fib. Given IV Lasix. Was admitted to ICU on BIPAP, DNR confirmed with severe sepsis, DEJUAN , presumed CHF and concern for osteomyelitis source with bilateral malodorous feet. Continued on Vancomycin, consulted intensivist, ID, cardiology, vascular, wound care. Was recently discharged by Dr. Stanton Hayward, vascular surgery service May 15 following right fourth left second and third toe amputations secondary to gangrene. Was discharged home on Midrin and Florinef. He is morbidly obesity with chronic 2-3 L home O2, bilateral carotid stenosis with CAD s/p CABG with AVR 1/10/20 by Dr. Venecia Rascon complicated by CKD requiring HD, HIT +, DVTs, wound left thigh and pneumonia, PVD, DM with diabetic nephropathy. Chronic anticoagulation with warfarin for paroxysmal A. Fib. Subjective: On 4 lpm NC- sat of 93%. TERRI planned with MRSA bacteremia in the setting of Hx AVR and PPM 
 
Review of Systems Cardiovascular: positive for ESTRELLA Current Facility-Administered Medications Medication Dose Route Frequency  [START ON 6/3/2020] warfarin (COUMADIN) tablet 5 mg  5 mg Oral QPM  
 warfarin (COUMADIN) tablet 7.5 mg  7.5 mg Oral ONCE  
 vancomycin (VANCOCIN) 1,000 mg in 0.9% sodium chloride (MBP/ADV) 250 mL  1,000 mg IntraVENous Q24H  
 furosemide (LASIX) tablet 40 mg  40 mg Oral DAILY  polyethylene glycol (MIRALAX) packet 17 g  17 g Oral DAILY  bisacodyL (DULCOLAX) tablet 5 mg  5 mg Oral DAILY PRN  
 ondansetron (ZOFRAN) injection 4 mg  4 mg IntraVENous Q6H PRN  
 0.9% sodium chloride infusion 250 mL  250 mL IntraVENous PRN  
  lidocaine (XYLOCAINE) 10 mg/mL (1 %) injection 0.1 mL  0.1 mL SubCUTAneous PRN  
 sodium chloride (NS) flush 5-40 mL  5-40 mL IntraVENous Q8H  
 sodium chloride (NS) flush 5-40 mL  5-40 mL IntraVENous PRN  
 0.9% sodium chloride infusion 250 mL  250 mL IntraVENous PRN  
 ALPRAZolam (XANAX) tablet 0.5 mg  0.5 mg Oral BID PRN  
 famotidine (PEPCID) tablet 20 mg  20 mg Oral DAILY  insulin glargine (LANTUS) injection 25 Units  25 Units SubCUTAneous QHS  NUTRITIONAL SUPPORT ELECTROLYTE PRN ORDERS   Does Not Apply PRN  
 meropenem (MERREM) 500 mg in 0.9% sodium chloride (MBP/ADV) 50 mL  0.5 g IntraVENous Q8H  
 sodium chloride (NS) flush 5-40 mL  5-40 mL IntraVENous Q8H  
 sodium chloride (NS) flush 5-40 mL  5-40 mL IntraVENous PRN  
 acetaminophen (TYLENOL) tablet 650 mg  650 mg Oral Q4H PRN  
 albuterol (PROVENTIL VENTOLIN) nebulizer solution 2.5 mg  2.5 mg Nebulization Q6H RT  
 insulin lispro (HUMALOG) injection   SubCUTAneous AC&HS  
 buPROPion (WELLBUTRIN) tablet 75 mg  75 mg Oral BID  midodrine (PROAMATINE) tablet 10 mg  10 mg Oral TID  OLANZapine (ZyPREXA) tablet 5 mg  5 mg Oral QHS  mirtazapine (REMERON) tablet 15 mg  15 mg Oral QHS  sertraline (ZOLOFT) tablet 100 mg  100 mg Oral DAILY  tamsulosin (FLOMAX) capsule 0.4 mg  0.4 mg Oral DAILY Objective:  
 
Vitals:  
 06/02/20 1103 06/02/20 0730 06/02/20 0816 06/02/20 1059 BP: 149/73  105/52 123/69 Pulse: 70  85 80 Resp: 20   18 Temp: 98.4 °F (36.9 °C)   97.9 °F (36.6 °C) SpO2: 98% 94%  93% Weight:      
Height:      
 
Intake and Output:  
05/31 1901 - 06/02 0700 In: 1608.3 [P.O.:1130; I.V.:478.3] Out: 1700 [Urine:1700] No intake/output data recorded. Intake/Output Summary (Last 24 hours) at 6/2/2020 1257 Last data filed at 6/1/2020 1327 Gross per 24 hour Intake 240 ml Output  Net 240 ml Physical Exam:          
Constitutional: the patient is obese on 4 lpm 
 HEENT: Sclera clear, pupils equal, oral mucosa moist 
Lungs: crackles in left base on 4 lpm 
Cardiovascular: IRR without M,G,R, PM 
Abd/GI: soft and non-tender; with positive bowel sounds. rounded Ext: warm without cyanosis. There is no lower leg edema. Musculoskeletal: moves all four extremities with equal strength, right toe wound Skin: no jaundice or rashes, toe on right foot with + wound Neuro: A & O X 3 Lines/Drains: PIV,  brown Nutrition: ADA CHEST XRAY: 
 
5/27 LAB Recent Labs  
  06/02/20 
0600 05/31/20 
0706 WBC 9.7 12.4* HGB 9.3* 9.9*  
HCT 30.6* 32.0*  
 271 Recent Labs  
  06/02/20 
0600 06/01/20 
1253 05/31/20 
0706 NA  --   --  136 K  --   --  3.9 CL  --   --  96* CO2  --   --  35* GLU  --   --  127* BUN  --   --  39* CREA  --   --  1.78* INR 1.8 1.7 1.3 Bilateral Ultra Sound done of the chest, pictures taken, placed in chart. Not enough fluid to warrant thoracentesis per Dr. Larsen [place]. Assessment:  
 
Hospital Problems  Date Reviewed: 5/27/2020 Codes Class Noted POA Pulmonary edema, noncardiac ICD-10-CM: J81.1 ICD-9-CM: 200  5/25/2020 Yes Osteomyelitis ([facility] ICD-10-CM: M86.9 ICD-9-CM: 730.20  5/24/2020 Yes DEJUAN (acute kidney injury) ([facility] ICD-10-CM: N17.9 ICD-9-CM: 584.9  5/24/2020 Yes PAD (peripheral artery disease) (HCC) (Chronic) ICD-10-CM: I73.9 ICD-9-CM: 443.9  5/14/2020 Yes Gangrene of toe of both feet ([facility] ICD-10-CM: V57 
ICD-9-CM: 785.4  5/11/2020 Yes Long term (current) use of anticoagulants (Chronic) ICD-10-CM: Z79.01 
ICD-9-CM: V58.61  4/27/2020 Yes Pacemaker (Chronic) ICD-10-CM: Z95.0 ICD-9-CM: V45.01  4/27/2020 Yes Overview Signed 5/6/2020  9:46 PM by Malissa Vidales MD  
  Dual chamber Biotronik pacemaker placed (1/24/20): AV block post op AVR. 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 
ICD-9-CM: 427.31  1/13/2020 Yes Overview Signed 5/6/2020  9:46 PM by Araceli Alonso MD  
  Post op CABG. CAD (coronary artery disease) (Chronic) ICD-10-CM: I25.10 ICD-9-CM: 414.00  1/10/2020 Yes Overview Signed 5/6/2020  1:19 PM by Araceli Alonso MD  
  CABG (1/16/20):  LIMA to LAD. SVG to OM. SVG to PDA. S/P CABG x 3 (Chronic) ICD-10-CM: Z95.1 ICD-9-CM: V45.81  1/10/2020 Yes Status post aortic valve replacement (Chronic) ICD-10-CM: Z95.2 ICD-9-CM: V43.3  1/10/2020 Yes Overview Addendum 5/6/2020  9:43 PM by Araceli Alonso MD  
  1. AVR (1/16/20):  25 mm Intuity Jon Gutierres Valve. 2.  Echo (4/12/20):  EF 55-60%. Normal functioning AVR. MG 15. PG 26. Mild MR. 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 
ICD-9-CM: 250.40, 583.81  8/26/2019 Yes Obesity, morbid ([address] (Chronic) ICD-10-CM: E66.01 
ICD-9-CM: 278.01  10/19/2018 Yes Hx of AVR, PPM and presented with sepsis bacteremia and necrotic toe. He is s/p debridement and has been seen by ID. ABX given. Diuresed for gross volume overload. Encouraged to wear BiPAP. TERRI ordered for bacteremia with valve. Weak, will need rehab after discharge. Has home BiPAP PLAN:  
-- back on coumadin 5 mg Q HS 
-- lasix added back- 40 mg orally daily,  B/P okay on midodrine. Cr up to 1.7. Watch I/O 
-- Continue Vancomycin--Duration 6 weeks minimum (EOT 7/9). Continue Meropenem (change to ertapenem for outpt ease) for 4 weeks( 6/24)  to treat ESBL E coli 
-- mobilize, PT/OT- recommending rehab -- will need oxygen -- BiPAP here, he has a home machine he just needs to use it Ana Posadas, NP-C Lungs:  clear Heart:  RRR with no Murmur/Rubs/Gallops Additional Comments: On lasix. Chest U/S with small effusions to small to tap yesterday. Wearing BIPAP at night. Needs rehab, continue PT. I have spoken with and examined the patient. I agree with the above assessment and plan as documented.  
 
Raghav Lorenzo MD

## 2020-06-02 NOTE — PROGRESS NOTES
Hourly rounds completed this shift. All needs met. Bed low/locked. No c/o pain. Call light in reach. Will continue to monitor pt and give report to oncoming RN. Peripheral IV 05/24/20 Left Forearm (Active) Site Assessment Clean, dry, & intact 6/2/2020  3:01 PM  
Phlebitis Assessment 0 6/2/2020  3:01 PM  
Infiltration Assessment 0 6/2/2020  3:01 PM  
Dressing Status Clean, dry, & intact 6/2/2020  3:01 PM  
Dressing Type Transparent;Tape 6/2/2020  3:01 PM  
Hub Color/Line Status Pink;Flushed;Patent 6/2/2020  3:01 PM  
Alcohol Cap Used No 5/31/2020  8:39 PM

## 2020-06-02 NOTE — PROGRESS NOTES
Hourly rounds completed this shift. Patient rested quietly throughout the night. Patient's brown draining yellow, clear urine. All needs met at this time. Will continue to monitor & give report to oncoming RN.

## 2020-06-02 NOTE — PROGRESS NOTES
Brief Cardiac Procedure Note Patient: Tarik García MRN: [id number]  SSN: [id number] YOB: 1945  Age: 69 y.o. Sex: male Date of Procedure: 6/2/2020 Pre-procedure Diagnosis: MRSA bactermia Post-procedure Diagnosis: Aortic valve vegetation and vegetation on ventricular lead. Procedure: Transesophageal Echocardiogram 
 
Brief Description of Procedure: As above Performed By: Patys Serrano MD  
 
Assistants: None Anesthesia: Moderate Sedation Estimated Blood Loss: Less than 10 mL Specimens: None Implants: None Findings: Vegetations on right ventricular lead and aortic valve. Complications: None Recommendations: Difficult situation. Will have pacemaker interrogated to see if needed as this could be removed. Very unlikely patient would survive redo AVR. ? Lifelong antibiotic suppression. Await ID recs. Signed By: Patsy Serrano MD   
 June 2, 2020

## 2020-06-02 NOTE — PROGRESS NOTES
TERRI by Dr Watt Lesches 
II ASA II Mallampati 
4mg versed 50mcg fentanyl Viscous Solution given at 1445 Pt tolerated well.  Vegetation presents per MD.

## 2020-06-02 NOTE — PROGRESS NOTES
06/01/20 2215 Oxygen Therapy O2 Sat (%) 97 % Pulse via Oximetry 77 beats per minute O2 Device BIPAP  
FIO2 (%) 40 % Respiratory Respiratory (WDL) X Respiratory Pattern Dyspnea with exertion Breath Sounds Bilateral Diminished CPAP/BIPAP  
CPAP/BIPAP Start/Stop On Device Mode BIPAP  
$$ Bipap Daily Yes Mask Type and Size Full face; Medium Skin Condition Intact PIP Observed 17 cm H20 IPAP (cm H2O) 16 cm H2O  
EPAP (cm H2O) 8 cm H2O Inspiratory Time (sec) 1 seconds Vt Spont (ml) 503 ml Ve Observed (l/min) 12 l/min Backup Rate 12 Total RR (Spontaneous) 26 breaths per minute Insp Rise Time (sec) 3 Leak (Estimated) 16 L/min  
Pt's Home Machine No  
Biomedical Check Performed Yes Settings Verified Yes Alarm Settings High Pressure 40 Low Pressure 2 Apnea 20 Low Ve 4 High Rate 50 Low Rate 8 Pulmonary Toilet Pulmonary Toilet H. O.B elevated Patient placed on bipap for the night

## 2020-06-02 NOTE — PROGRESS NOTES
[facility] CARDIOLOGY PROGRESS NOTE 
      
 
6/2/2020 4:57 PM 
 
Admit Date: 5/24/2020 Subjective:  
Patient notes dizziness has resolved with holding lasix. Edema stable. Need TERRI as requested by ID.  
 
ROS: 
Cardiovascular:  As noted above Objective:  
  
Vitals:  
 06/02/20 2076 06/02/20 1451 06/02/20 0730 06/02/20 5296 BP: 124/71 149/73  105/52 Pulse:  70  85 Resp:  20 Temp: 98.3 °F (36.8 °C) 98.4 °F (36.9 °C) SpO2: 99% 98% 94% Weight:      
Height:      
 
 
Physical Exam: 
General-No Acute Distress Neck- supple, no JVD 
CV- regular rate and rhythm no MRG Lung- clear bilaterally Abd- soft, nontender, nondistended Ext- No edema bilaterally. Skin- warm and dry Data Review:  
Recent Labs  
  06/01/20 
1253 05/31/20 
3777 NA  --  136 K  --  3.9 BUN  --  39* CREA  --  1.78* GLU  --  127* WBC  --  12.4* HGB  --  9.9* HCT  --  32.0*  
PLT  --  271 INR 1.7 1.3 No results found for: Trudi Carrel, TNIPOC Echo (5/25/20): -  Left ventricle: Systolic function was normal. Ejection fraction was estimated in the range of 55 % to 60 %. There were no regional wall motion abnormalities. There was mild concentric hypertrophy.  
-  Left atrium: The atrium was mildly dilated.  
-  Aortic valve: A 25mm Intuity Knowles Gutierres valve was present  
(1/10/20).   
-  Mitral valve: There was mild annular calcification. There was mild 
regurgitation.  
-  Tricuspid valve: There was mild regurgitation. Assessment/Plan:  
 
Principal Problem: 
  Severe sepsis with septic shock ([facility] (5/24/2020) Continue antibiotics. ID following. Plan TERRI today Active Problems: 
   Obesity, morbid ([address] (10/19/2018) Noted. Needs weight loss. Type 2 diabetes with nephropathy ([facility] (8/26/2019) Stable. CAD (coronary artery disease) (1/10/2020) No angina. Status post aortic valve replacement (1/10/2020) Normal function on echo. TERRI today Atrial fibrillation ([address] (1/13/2020) REstarted coumadin. HIT (heparin-induced thrombocytopenia) ([address] (1/23/2020) On coumadin. Avoid heparin. Hypotension (2/18/2020) Resolved with holding Lasix. REstart PO lasix tomorrow. DNR (do not resuscitate) (2/26/2020) Noted. Acute on chronic respiratory failure with hypoxia ([facility] (3/8/2020) Stop lasix as appears volume depleted with orthostatic symptoms. Address fluid status daily. Pacemaker (4/27/2020) STable.    
 
   
 
 
 
 
Akanksha Hael MD 
6/2/2020 4:57 PM

## 2020-06-02 NOTE — PROGRESS NOTES
Infectious Disease Progress Note Today's Date: 2020 Admit Date: 2020 Impression: · MRSA bacteremia  (, ): source would appear to be R foot. · R foot infection s/p earlier amputations for gangrene. Site cx   ESBL E Coli, E Faecalis, MRSA. S/p debridement () · PVD with recent amputations gangrenous toes on both right and left foots. X-ray with bony changes on R but given recent surgery unclear this represents OM. If has amputation/BKA then will not be an issue for treatment duration. · He reports PCN allergy as feeling strange after IM PCN injection when he was in [facility] and Keflex and rash. Overall doubt these are major allergies. · History of AVR and pacemaker Plan:  
· Continue Vancomycin--Duration 6 weeks minimum (EOT ). Continue Meropenem (change to ertapenem for outpt ease) for 4 weeks( )  to treat ESBL E coli. · TERRI today. · Dispo: undetermined Anti-infectives:  
Vancomycin - Clindamycin - Levaquin-->Edd Subjective:  
Working with PT. Denies nausea, vomiting, diarrhea, fever, chills, or sweats Review of Systems:  A comprehensive review of systems was negative except for that written in the History of Present Illness. Objective:  
 
Visit Vitals /52 Pulse 85 Temp 98.4 °F (36.9 °C) Resp 20 Ht 5' 10\" (1.778 m) Wt 111.1 kg (245 lb) SpO2 94% BMI 35.15 kg/m² Temp (24hrs), Av.3 °F (36.8 °C), Min:98.2 °F (36.8 °C), Max:98.5 °F (36.9 °C) No changes from my previous exam  Lines:  kevin Physical Exam: Exam:   
 General: NC/AT, alert and cooperative, looks stated age, in NAD HEENT: eyes closed Neck: supple, symmetric Cardiac: 
 Pulmonary: 
 Abdomen: soft,non-distended + BS Skin: No rashes MSK:no enlarged or swollen joints in limbs Extremities: R foot see below, mild drainage from GT amput site. L GT with dry gangrene--no cellulitis To PCP   Psychiatric: mood and affect appropriate to situation Data Review: CBC:  
Recent Labs 05/31/20 
0051 WBC 12.4*  
RBC 3.59* HGB 9.9*  
HCT 32.0*  
 CMP:  
Recent Labs 05/31/20 
4624 *   
K 3.9 CL 96* CO2 35* BUN 39* CREA 1.78* CA 8.6 AGAP 5* Liver Enzymes: No results for input(s): TP, ALB, TBIL, AP in the last 72 hours. No lab exists for component: SGOT, GPT, DBIL Microbiology:  
 
All Micro Results Procedure Component Value Units Date/Time EMERGENT DISEASE PANEL [[id number]] Collected:  05/31/20 1758 Order Status:  Completed Specimen:  Not Specified Updated:  06/02/20 7017 Emergent disease panel Not detected Comment: Performed at [facility] RESULTS SCANNED IN [facility] 
  
  
 CULTURE, BLOOD [[id number]]  (Abnormal)  (Susceptibility) Collected:  05/25/20 1543 Order Status:  Completed Specimen:  Blood Updated:  06/01/20 1233 Special Requests: --     
  RIGHT 
HAND 
  
  GRAM STAIN    
  GRAM POS COCCI IN CLUSTERS  
     
   AEROBIC BOTTLE POSITIVE RESULTS VERIFIED, PHONED TO AND READ BACK BY SHYAM Rosales [facility]. AT [address] [place] [address] ON 5.28.20   Culture result:    
  * METHICILLIN RESISTANT STAPHYLOCOCCUS AUREUS *  
     
      
  * METHICILLIN RESISTANT STAPHYLOCOCCUS AUREUS * (2ND COLONY TYPE/STRAIN) CULTURE, BLOOD [[id number]] Collected:  05/28/20 1453 Order Status:  Completed Specimen:  Blood Updated:  06/01/20 7495 Special Requests: --     
  RIGHT 
HAND Culture result: NO GROWTH 4 DAYS     
 CULTURE, BLOOD [[id number]] Collected:  05/28/20 1500 Order Status:  Completed Specimen:  Blood Updated:  06/01/20 7871 Special Requests: --     
  LEFT 
HAND Culture result: NO GROWTH 4 DAYS     
 CULTURE, WOUND Mikejoshua Marisol STAIN [[id number]]  (Abnormal)  (Susceptibility) Collected:  05/25/20 0200 Order Status:  Completed Specimen:  Wound from Foot Updated:  05/29/20 6294 Special Requests: NO SPECIAL REQUESTS     
  GRAM STAIN NO WBCS SEEN     
   NO EPITHELIAL CELLS SEEN     
      
  MODERATE GRAM POSITIVE COCCI MODERATE GRAM NEGATIVE RODS Culture result:    
  HEAVY ESCHERICHIA COLI ** (EXTENDED SPECTRUM BETA LACTAMASE ) ** MODERATE ENTEROCOCCUS FAECALIS GROUP D MODERATE * METHICILLIN RESISTANT STAPHYLOCOCCUS AUREUS *  
     
      
  ESBL CALLED TO AND CORRECTLY REPEATED BY: 
Eli Zaragoza RN @ 4550 ON 5/28/20 [place]. 
  
      
  ** MULTI-DRUG RESISTANT ORGANISM ** PATIENT IS A KNOWN MRSA SCANT MIXED SKIN BRENNAN ISOLATED  
     
 CULTURE, URINE [[id number]]  (Abnormal) Collected:  05/24/20 1900 Order Status:  Completed Specimen:  Cath Urine Updated:  05/28/20 0741 Special Requests: NO SPECIAL REQUESTS Culture result:    
  6000 COLONIES/mL SUKUMAR ALBICANS  
     
 CULTURE, BLOOD [[id number]]  (Abnormal) Collected:  05/25/20 1543 Order Status:  Completed Specimen:  Blood Updated:  05/28/20 9487 Special Requests: --     
  RIGHT Antecubital 
  
  GRAM STAIN GRAM POSITIVE COCCI AEROBIC BOTTLE POSITIVE RESULTS VERIFIED, PHONED TO AND READ BACK BY OLYA PERALTA @ 5080 5/27/20 MM Culture result: STAPHYLOCOCCUS AUREUS For Susceptibility Refer to Culture Accession [id number] CULTURE, BLOOD [[id number]]  (Abnormal) Collected:  05/24/20 1604 Order Status:  Completed Specimen:  Blood Updated:  05/28/20 7392 Special Requests: RIGHT ANTECUBITAL     
  GRAM STAIN    
  GRAM POS COCCI IN CLUSTERS  
     
      
  AEROBIC AND ANAEROBIC BOTTLES  
     
      
  CRITICAL RESULT NOT CALLED DUE TO PREVIOUS NOTIFICATION OF CRITICAL RESULT WITHIN THE LAST 24 HOURS. Culture result: STAPHYLOCOCCUS AUREUS For Susceptibility Refer to Culture Accession [id number] CULTURE, BLOOD [[id number]]  (Abnormal)  (Susceptibility) Collected:  05/24/20 1604 Order Status:  Completed Specimen:  Blood Updated:  05/28/20 4319 Special Requests: LEFT ANTECUBITAL     
  GRAM STAIN GRAM POSITIVE COCCI AEROBIC AND ANAEROBIC BOTTLES RESULTS VERIFIED, PHONED TO AND READ BACK BY Seth Gerberjose @ 0503 5/25/20 MM Culture result:    
  * METHICILLIN RESISTANT STAPHYLOCOCCUS AUREUS * REFER TO BIOFIRE  PANEL ACC [id number] RESULTS VERIFIED, PHONED TO AND READ BACK BY 
Zamzam Ryder RN ON 5/28/20 @0732, TA 
  
 BLOOD CULTURE ID PANEL [[id number]]  (Abnormal) Collected:  05/24/20 1604 Order Status:  Completed Specimen:  Blood Updated:  05/25/20 9795 Acc. no. from [facility] [id number] Staphylococcus Detected Staphylococcus aureus Detected Comment: RESULTS VERIFIED, PHONED TO AND READ BACK BY 
Janel Santoro RN @ 8114 ON 5/25/2020 [place]. mecA (Methicillin-Resistance Genes) Detected Comment: Presence of mecA is highly indicative of methicillin resistance. The test does not replace traditional culture and susceptibilities RESULTS VERIFIED, PHONED TO AND READ BACK BY 
Janel Santoro RN @ 6355 ON 05/25/2020 [place]. INTERPRETATION Gram positive cocci in clusters, identified by realtime PCR as SUSPECTED MRSA. Comment: Recommend IV vancomycin use, unlikely to be a contaminant. Infectious Diseases Consult recommended in adult patients. THIS TEST DOES NOT REPLACE SENSITIVITY TESTING. Imaging: No new Signed By: Nessa Interiano NP   
 June 2, 2020

## 2020-06-03 NOTE — PROGRESS NOTES
Hourly rounds completed this shift. Patient rested quietly throughout the night with no complaints. All needs met at this time. Will continue to monitor & give report to oncoming RN.

## 2020-06-03 NOTE — PROGRESS NOTES
Patient placed on BiPAP for use QHS. No distress or discomfort currently noted. Will continue to monitor. 06/02/20 2205 Oxygen Therapy O2 Sat (%) 97 % Pulse via Oximetry 75 beats per minute O2 Device BIPAP  
FIO2 (%) 40 % CPAP/BIPAP  
CPAP/BIPAP Start/Stop On Device Mode BIPAP;S/T  
$$ Bipap Daily Yes Mask Type and Size Full face; Medium Skin Condition Intact PIP Observed 17 cm H20 IPAP (cm H2O) 16 cm H2O  
EPAP (cm H2O) 8 cm H2O Inspiratory Time (sec) 1 seconds Vt Spont (ml) 581 ml Ve Observed (l/min) 11.03 l/min Backup Rate 12 Total RR (Spontaneous) 19 breaths per minute Insp Rise Time (sec) 3 Leak (Estimated) 25 L/min  
Pt's Home Machine No  
Biomedical Check Performed Yes Settings Verified Yes Alarm Settings High Pressure 40 Low Pressure 2 Apnea 20 Low Ve 4 High Rate 50 Low Rate 8 Pulmonary Toilet Pulmonary Toilet H. O.B elevated

## 2020-06-03 NOTE — PROGRESS NOTES
Infectious Disease Progress Note Today's Date: 6/3/2020 Admit Date: 5/24/2020 Impression: · MRSA bacteremia with AVIE and vegetation on PPM lead  (5/24, 5/25): source would appear to be R foot that ultimately contaminated PPM 
· R foot infection s/p earlier amputations for gangrene. Site cx 5/25  ESBL E Coli, E Faecalis, MRSA. S/p debridement (5/27) · PVD with recent amputations gangrenous toes on both right and left foots. X-ray with bony changes on R but given recent surgery unclear this represents OM. If has amputation/BKA then will not be an issue for treatment duration. · He reports PCN allergy as feeling strange after IM PCN injection when he was in [facility] and Keflex and rash. Overall doubt these are major allergies. Plan:  
· Continue Vancomycin--Duration 6 weeks minimum (EOT 7/9) with lifelong suppression. PPM to be interrogated today to see if extraction is possible. Doubt he is a surgical candidate as he encountered multiple complications from original CABG/AVR in 1/2020. ·  Continue Meropenem (change to ertapenem for outpt ease) for 4 weeks( 6/24)  to treat ESBL E coli. · Consult palliative care to discuss goals of care. He states he wants to surgery because he \"wouldn't survive\" \"I don't want to be a burden on my family\" · Dispo: SNF Anti-infectives:  
Vancomycin 5/24- Clindamycin 5/24-5/28 Levaquin-->Edd Subjective:  
Upset about TERRI news. States he only wants surgical to \"death without pain. \" Extensive conversation held about palliative measures. Tolerating Vancomycin well. Review of Systems:  A comprehensive review of systems was negative except for that written in the History of Present Illness. Objective:  
 
Visit Vitals /77 (BP 1 Location: Left arm, BP Patient Position: At rest) Pulse 79 Temp 97.6 °F (36.4 °C) Resp 20 Ht 5' 10\" (1.778 m) Wt 111.1 kg (245 lb) SpO2 92% BMI 35.15 kg/m² Temp (24hrs), Av.1 °F (36.7 °C), Min:97.6 °F (36.4 °C), Max:98.7 °F (37.1 °C) No changes from my previous exam  Lines:  kevin Physical Exam: Exam:   
 General: NC/AT, alert and cooperative, looks stated age, in NAD HEENT: eyes closed Neck: supple, symmetric Cardiac: S1, s2 noted. No significant murmur Pulmonary: on 3-4L O2, diminished Abdomen: soft,non-distended + BS Skin: No rashes MSK:no enlarged or swollen joints in limbs Extremities: R foot see below, mild drainage from GT amput site. L GT with dry gangrene--no cellulitis Psychiatric: mood and affect appropriate to situation Data Review: CBC:  
Recent Labs  
  20 
0516 20 
0600 WBC 9.1 9.7  
RBC 3.56* 3.42* HGB 9.8* 9.3* HCT 31.9* 30.6*  264 GRANS 74  --   
LYMPH 17  --   
EOS 3  --   
 
CMP:  
Recent Labs  
  20 
0516 20 
0600 GLU 84 108*  138  
K 3.9 4.0  
 100 CO2 34* 33* BUN 35* 35* CREA 1.51* 1.42  
CA 8.7 7.7* AGAP 5* 5* Liver Enzymes: No results for input(s): TP, ALB, TBIL, AP in the last 72 hours. No lab exists for component: SGOT, GPT, DBIL Microbiology:  
 
All Micro Results Procedure Component Value Units Date/Time CULTURE, BLOOD [[id number]] Collected:  20 1500 Order Status:  Completed Specimen:  Blood Updated:  20 1143 Special Requests: --     
  LEFT 
HAND Culture result: NO GROWTH 5 DAYS     
 CULTURE, BLOOD [[id number]] Collected:  20 1453 Order Status:  Completed Specimen:  Blood Updated:  20 1143 Special Requests: --     
  RIGHT 
HAND Culture result: NO GROWTH 5 DAYS     
 EMERGENT DISEASE PANEL [[id number]] Collected:  20 5058 Order Status:  Completed Specimen:  Not Specified Updated:  20 7670 Emergent disease panel Not detected Comment: Performed at [facility] RESULTS SCANNED IN [facility] CARE 
  
  
 CULTURE, BLOOD [[id number]]  (Abnormal)  (Susceptibility) Collected:  05/25/20 1543 Order Status:  Completed Specimen:  Blood Updated:  06/01/20 1233 Special Requests: --     
  RIGHT 
HAND 
  
  GRAM STAIN    
  GRAM POS COCCI IN CLUSTERS  
     
   AEROBIC BOTTLE POSITIVE RESULTS VERIFIED, PHONED TO AND READ BACK BY SHYAM Carvalho 15. AT [facility] ON 5.28.20  JL Culture result:    
  * METHICILLIN RESISTANT STAPHYLOCOCCUS AUREUS *  
     
      
  * METHICILLIN RESISTANT STAPHYLOCOCCUS AUREUS * (2ND COLONY TYPE/STRAIN) CULTURE, Mateus Merl STAIN [[id number]]  (Abnormal)  (Susceptibility) Collected:  05/25/20 0200 Order Status:  Completed Specimen:  Wound from Foot Updated:  05/29/20 3004 Special Requests: NO SPECIAL REQUESTS     
  GRAM STAIN NO WBCS SEEN     
   NO EPITHELIAL CELLS SEEN     
      
  MODERATE GRAM POSITIVE COCCI MODERATE GRAM NEGATIVE RODS Culture result:    
  HEAVY ESCHERICHIA COLI ** (EXTENDED SPECTRUM BETA LACTAMASE ) ** MODERATE ENTEROCOCCUS FAECALIS GROUP D MODERATE * METHICILLIN RESISTANT STAPHYLOCOCCUS AUREUS *  
     
      
  ESBL CALLED TO AND CORRECTLY REPEATED BY: 
Debby Orozco RN @ 1932 ON 5/28/20 AK. 
  
      
  ** MULTI-DRUG RESISTANT ORGANISM ** PATIENT IS A KNOWN MRSA SCANT MIXED SKIN BRENNAN ISOLATED  
     
 CULTURE, URINE [[id number]]  (Abnormal) Collected:  05/24/20 1900 Order Status:  Completed Specimen:  Cath Urine Updated:  05/28/20 0741 Special Requests: NO SPECIAL REQUESTS Culture result:    
  6000 COLONIES/mL SUKUMAR ALBICANS  
     
 CULTURE, BLOOD [[id number]]  (Abnormal) Collected:  05/25/20 1543 Order Status:  Completed Specimen:  Blood Updated:  05/28/20 5242 Special Requests: --     
  RIGHT Antecubital 
  
  GRAM STAIN GRAM POSITIVE COCCI    AEROBIC BOTTLE POSITIVE     
      
 RESULTS VERIFIED, PHONED TO AND READ BACK BY OLYA PERALTA @ 3736 5/27/20 MM Culture result: STAPHYLOCOCCUS AUREUS For Susceptibility Refer to Culture Accession [id number] CULTURE, BLOOD [[id number]]  (Abnormal) Collected:  05/24/20 1604 Order Status:  Completed Specimen:  Blood Updated:  05/28/20 6072 Special Requests: RIGHT ANTECUBITAL     
  GRAM STAIN    
  GRAM POS COCCI IN CLUSTERS  
     
      
  AEROBIC AND ANAEROBIC BOTTLES  
     
      
  CRITICAL RESULT NOT CALLED DUE TO PREVIOUS NOTIFICATION OF CRITICAL RESULT WITHIN THE LAST 24 HOURS. Culture result: STAPHYLOCOCCUS AUREUS For Susceptibility Refer to Culture Accession [id number] CULTURE, BLOOD [[id number]]  (Abnormal)  (Susceptibility) Collected:  05/24/20 1604 Order Status:  Completed Specimen:  Blood Updated:  05/28/20 4373 Special Requests: LEFT ANTECUBITAL     
  GRAM STAIN GRAM POSITIVE COCCI AEROBIC AND ANAEROBIC BOTTLES RESULTS VERIFIED, PHONED TO AND READ BACK BY Ayla Hernandez @ 0503 5/25/20 MM Culture result:    
  * METHICILLIN RESISTANT STAPHYLOCOCCUS AUREUS * REFER TO BIOFIRE  PANEL ACC [id number] RESULTS VERIFIED, PHONED TO AND READ BACK BY 
Kira Davis RN ON 5/28/20 @0732,  
  
 BLOOD CULTURE ID PANEL [[id number]]  (Abnormal) Collected:  05/24/20 1604 Order Status:  Completed Specimen:  Blood Updated:  05/25/20 0173 Acc. no. from [employer] [id number] Staphylococcus Detected Staphylococcus aureus Detected Comment: RESULTS VERIFIED, PHONED TO AND READ BACK BY 
José Antonio Burch RN @ 3489 ON 5/25/2020 AK. mecA (Methicillin-Resistance Genes) Detected Comment: Presence of mecA is highly indicative of methicillin resistance. The test does not replace traditional culture and susceptibilities RESULTS VERIFIED, PHONED TO AND READ BACK BY 
José Antonio Burch RN @ 4424 ON 05/25/2020 AK. INTERPRETATION Gram positive cocci in clusters, identified by realtime PCR as SUSPECTED MRSA. Comment: Recommend IV vancomycin use, unlikely to be a contaminant. Infectious Diseases Consult recommended in adult patients. THIS TEST DOES NOT REPLACE SENSITIVITY TESTING. Imaging: No new Signed By: Benjamin Azar NP   
 Shannan 3, 2020

## 2020-06-03 NOTE — PROGRESS NOTES
Surgical wound cleaned. First 3 sutures removed, and wet to dry dressing placed. Per order from vascular surgery.

## 2020-06-03 NOTE — PROGRESS NOTES
Warfarin dosing per pharmacist 
 
Rudy Christianson is a 69 y.o. male. 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 Carolyn Balbuena visit on 5/19/2020 Per discussion with Dr. Aspen Mandujano (Cardiology) on 6/3/2020, will target goal 2 to 3 for now. Home dose:  5 mg Sun, Tues, Thurs, 7.5 mg all other days of the week (45 mg total weekly dose) Risk factors or significant drug interactions:  None Other anticoagulants:  N/A *History of HIT Daily Monitoring Date  INR     Warfarin dose HGB              Notes 5/25  7.4  Hold  8.5 2.5 mg PO Vit K admin 5/26  >9.9  Hold  7.0 10 mg PO Vit K admin 5/27  2.4  Hold  6.8        1 unit PRBC transfused 5/28  2  Hold  7.6  1 unit PRBC transfused 5/29  1.8  5 mg  9.5 5/30  1.2  5 mg   10.2 
5/31  1.3  5 mg   9.9 6/1  1.7  5 mg  -- 
6/2  1.8  7.5 mg  9.3 6/3  1.9  5 mg  9.8 Patient with labile INRs since admission, increased to > 9.9 on 5/26. INR still trending upward,  but slowly. INR 1.9 today. Will give 5 mg this evening. Noted that cardiology plans to have pacer interrogated to evaluate if it would be possible to remove pacer due to vegetation found on right ventricular lead. Aware that warfarin would likely need to be held for this. Please alert pharmacy if decision is made to attempt pacer removal. 
 
Pharmacy will continue to follow patient and monitor INR daily. Thank you, 
Francisco Puente, PharmD, BCPS Clinical Pharmacist 
[phone]

## 2020-06-03 NOTE — PROGRESS NOTES
Pharmacokinetic Consult to Pharmacist 
 
Saintclair Patricia is a 69 y.o. male being treated for SSTI-  with Vanc. Height: 5' 10\" (177.8 cm)  Weight: 111.1 kg (245 lb) Lab Results Component Value Date/Time BUN 35 (H) 06/03/2020 05:16 AM  
 Creatinine 1.51 (H) 06/03/2020 05:16 AM  
 WBC 9.1 06/03/2020 05:16 AM  
 Procalcitonin 8.17 05/24/2020 04:04 PM  
 Lactic acid 1.6 05/25/2020 01:08 AM  
  
Estimated Creatinine Clearance: 54.4 mL/min (A) (based on SCr of 1.51 mg/dL (H)). CULTURES: 
5/25 - Blood - 1/4 vials GPC 
5/25 - Wound - GNR and GPC 
5/24 - Urine - NGTD 
5/24 - Blood x 2 - 4/4 vials MRSA Lab Results Component Value Date/Time Vancomycin,trough 19.9 06/03/2020 04:15 PM  
 Vancomycin, random 16.7 06/01/2020 12:53 PM  
 
 
Day11 of vancomycin. Goal trough is 15-20mcg/ml. Vancomycin random level 19.9 today and renal function increasing a bit. Will adjust to 750 mg q 24 hours and assess renal function. May need to reduce dose further. Will follow closely. Will continue to follow patient. Thank you, Abby Michelle, PharmD Clinical Pharmacist 
[phone]

## 2020-06-03 NOTE — CONSULTS
Palliative Care Patient: Meryle Billings MRN: [id number]  SSN: [id number] YOB: 1945  Age: 69 y.o. Sex: male Date of Request: 6/3/2020 Date of Consult:  6/3/2020 Reason for Consult:  goals of care and medical decision making Requesting Physician: Dr. Mirta Moeller Assessment/Plan:  
 
Principal Diagnosis:   
Debility, Unspecified  R53.81 Additional Diagnoses:  
· Sepsis, Unspecified Organism:  A41.9 · Counseling, Encounter for Medical Advice  Z71.9 
· Encounter for Palliative Care  Z51.5 Palliative Performance Scale (PPS): PPS: 40 Medical Decision Making:  
Reviewed and summarized labs and imaging. Met with pt at bedside. We reviewed current medical conditions and options for treatment. Pt states he has been considering his options and understands his risks. Pt states he would wish to be aggressive and pursue surgical removal of pacemaker vs life long abx therapy as he does not feel this would bring quality of life. Pt understands high risk for decline and possible death with procedure which he accepts vs trying to continue abx indefinitely. He wishes to discuss more with his son who states is more reasonable than pt is. Will follow up and continue goals discussions. Will discuss findings with members of the interdisciplinary team.   
 
Thank you for this referral.    
 
 
Subjective:  
 
History obtained from:  Patient and Chart Chief Complaint: debility History of Present Illness:  68 yo hx of DVT, HIT, a fib, morbid obesity on chronic 2-3L NC at home, sleep apnea, osteomyelitis, CKD stage III baseline creatine 2.4, s/p aortic valve replacement with bioprosthetic valve and CABG January of 2020, paroxysmal a fib on warfarin, chronic gangrenous toes, PVD, pacemaker, depression, carotid stenosis who presented to the ED for cc near syncope. Patient found to be in severe sepsis, pulmonary edema, DEJUAN, along with malodorous foot wounds.  Recently had removal of right 4th and left 2nd and 3rd digits on May 15th by Dr. Castro Grant. Blood cultures growing MRSA. Wound cultures growing e faecalis, e coli, and MRSA. Repeat blood cultures on 5/28 no growth. COVID negative. Pulmonology also following for his pulmonary edema. ID following. Advance Directive: No      
Code Status:  DNR Health Care Power of : No - Patient does not have a [facility]. Past Medical History:  
Diagnosis Date  Acute renal failure on dialysis ([facility] [address] 2/25/2020  Arthritis  BPH (benign prostatic hyperplasia) 1/14/2013  CAD (coronary artery disease) CABG- 1-2020  DM type 2 (diabetes mellitus, type 2) ([facility] [address] dx 2004 Type 2, avg fbs 250 recognizes hypo at 67,  Dyspnea   
 at times, Uses Albuterol inhaler when needed (last used first of aug 2014)  Gout 1/14/2013  HLD (hyperlipidemia) 1/14/2013  
 HTN (hypertension)   
 controlled with meds  Morbid obesity ([facility] [address] 9/3/14 BMI 41.7  Oxygen dependent 2.5 liters mainly at night, as needed  Psychiatric disorder   
 anxiety, controlled with buspar  Rheumatic fever   
 as a child  Seasonal allergic rhinitis   
 treated with Allegra  Severe aortic valve stenosis   
 echo 09/11/19 EF 60-65%- mild to moderate regurgitation  Thyroid disease   
 hx- no longer takes synthroid  Unspecified sleep apnea 2016  
 no bipap at this time Past Surgical History:  
Procedure Laterality Date  HX CATARACT REMOVAL Bilateral 2012  HX HEART CATHETERIZATION  12/23/2019  HX ORTHOPAEDIC Left   
 carpal tunnel  HX TONSIL AND ADENOIDECTOMY  IR INSERT TUNL CVC W PORT OVER 5 YEARS  2/4/2020  
 removed  VASCULAR SURGERY PROCEDURE UNLIST    
 cabg x 3 with avr Family History Problem Relation Age of Onset  Lung Disease Mother   
     copd  Cancer Father   
     lympnode cancer  No Known Problems Brother  Cancer Other   
     fmh lymphoma  Diabetes Other   
     fmhx  Diabetes Maternal Grandfather Social History Tobacco Use  Smoking status: Never Smoker  Smokeless tobacco: Never Used Substance Use Topics  Alcohol use: Not Currently Alcohol/week: 0.0 standard drinks Prior to Admission medications Medication Sig Start Date End Date Taking? Authorizing Provider Jantoven 7.5 mg tablet Take 1 Tab by mouth daily. Jennye Boehringer and Friday or as directed 5/22/20   Rayo De Oliveira MD  
Jantoven 5 mg tablet Take 1 Tab by mouth daily. Tues, Thurs and Sunday or as directed 5/22/20   Rayo De Oliveira MD  
furosemide (LASIX) 40 mg tablet Take 1 Tab by mouth two (2) times a day. Take 1 tablet twice a day for 3 days then resume 1 tablet per day 5/15/20   Shanae Aldana NP  
ALPRAZolam Keturah Heys) 0.5 mg tablet Take 1 Tab by mouth nightly as needed for Anxiety. Max Daily Amount: 0.5 mg. 5/13/20   Thierno Christine MD  
sertraline (ZOLOFT) 100 mg tablet Take 1 Tab by mouth daily. 5/13/20   Thierno Christine MD  
potassium chloride (KLOR-CON) 10 mEq tablet Take 1 Tab by mouth daily. 5/13/20   Thierno Christine MD  
tamsulosin (Flomax) 0.4 mg capsule Take 1 Cap by mouth daily. 4/29/20   Thierno Christine MD  
OLANZapine (ZyPREXA) 5 mg tablet Take 1 Tab by mouth nightly. 4/29/20   Thierno Christine MD  
mirtazapine (Remeron) 15 mg tablet Take 1 Tab by mouth nightly. 4/29/20   Thierno Christine MD  
insulin glargine (Lantus Solostar U-100 Insulin) 100 unit/mL (3 mL) inpn 25 units sq qhs and 5 units sq qam 4/29/20   Thierno Christine MD  
buPROPion [facility]) 75 mg tablet Take 1 Tab by mouth two (2) times a day. 4/29/20   Thierno Christine MD  
insulin aspart U-100 (NovoLOG) 100 unit/mL crtg Check sugar qac.   Less than 200 no units, 201-250 2 units sq, 251-300 4 units sq, 301-350 6 units sq, 351-400 8 units sq, over 401 10 units sq. 4/29/20   Erika Lindsay MD  
 fludrocortisone (FLORINEF) 0.1 mg tablet 0.1 mg daily. 2 tabs po daily 4/26/20   Provider, Historical  
midodrine (PROAMATINE) 10 mg tablet  4/26/20   Provider, Historical  
 
 
Allergies Allergen Reactions  Heparin Other (comments) HIT antibody test strongly positive on 1/17/2020. SHARON drawn 1/18/2020 and resulted positive on 1/21/2020  Amoxicillin Rash  Keflex [Cephalexin] Rash Review of systems negative with exception of noted above Objective:  
 
Visit Vitals /73 (BP 1 Location: Left arm, BP Patient Position: Sitting) Pulse 89 Temp 97.4 °F (36.3 °C) Resp 20 Ht 5' 10\" (1.778 m) Wt 245 lb (111.1 kg) SpO2 97% BMI 35.15 kg/m² Physical Exam: 
 
General:  Cooperative. No acute distress. Eyes:  Conjunctivae/corneas clear Nose: Nares normal. Septum midline. Neck: Supple, symmetrical, trachea midline, no JVD Lungs:   Clear to auscultation bilaterally, unlabored Heart:  Regular rate and rhythm, no murmur Abdomen:   Soft, non-tender, non-distended. Positive bowel sounds Extremities: Right foot with multiple toe amputations Skin: Skin color, texture, turgor normal. No rash or lesions. Neurologic: Nonfocal  
Psych: Alert and oriented Assessment:  
 
Hospital Problems  Date Reviewed: 5/27/2020 Codes Class Noted POA Pulmonary edema, noncardiac ICD-10-CM: J81.1 ICD-9-CM: 262  5/25/2020 Yes Osteomyelitis ([facility] ICD-10-CM: M86.9 ICD-9-CM: 730.20  5/24/2020 Yes DEJUAN (acute kidney injury) ([facility] [address] ICD-10-CM: N17.9 ICD-9-CM: 584.9  5/24/2020 Yes PAD (peripheral artery disease) (HCC) (Chronic) ICD-10-CM: I73.9 ICD-9-CM: 443.9  5/14/2020 Yes Gangrene of toe of both feet ([facility] ICD-10-CM: C01 
ICD-9-CM: 785.4  5/11/2020 Yes Long term (current) use of anticoagulants (Chronic) ICD-10-CM: Z79.01 
ICD-9-CM: V58.61  4/27/2020 Yes Pacemaker (Chronic) ICD-10-CM: Z95.0 ICD-9-CM: V45.01  4/27/2020 Yes Overview Signed 5/6/2020  9:46 PM by Marina Brown MD  
  Dual chamber Biotronik pacemaker placed (1/24/20): AV block post op AVR. 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 
ICD-9-CM: 427.31  1/13/2020 Yes Overview Signed 5/6/2020  9:46 PM by Marina Brown MD  
  Post op CABG. CAD (coronary artery disease) (Chronic) ICD-10-CM: I25.10 ICD-9-CM: 414.00  1/10/2020 Yes Overview Signed 5/6/2020  1:19 PM by Marina Brown MD  
  CABG (1/16/20):  LIMA to LAD. SVG to OM. SVG to PDA. S/P CABG x 3 (Chronic) ICD-10-CM: Z95.1 ICD-9-CM: V45.81  1/10/2020 Yes Status post aortic valve replacement (Chronic) ICD-10-CM: Z95.2 ICD-9-CM: V43.3  1/10/2020 Yes Overview Addendum 5/6/2020  9:43 PM by Marina Brown MD  
  1. AVR (1/16/20):  25 mm Intuity Jon Gutierres Valve. 2.  Echo (4/12/20):  EF 55-60%. Normal functioning AVR. MG 15. PG 26. Mild MR. 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 
ICD-9-CM: 250.40, 583.81  8/26/2019 Yes Obesity, morbid ([address] (Chronic) ICD-10-CM: E66.01 
ICD-9-CM: 278.01  10/19/2018 Yes Signed By: Tramaine Albarran MD   
 Shannan 3, 2020

## 2020-06-03 NOTE — PROGRESS NOTES
Pt. Complains of chills. Presenting in the bed with rigors. Oral temp 98. 6. chasidy YU. Will continue to monitor.

## 2020-06-03 NOTE — PROGRESS NOTES
Progress Note Patient: Joel Dougherty MRN: [id number]  SSN: [id number] YOB: 1945  Age: 69 y.o. Sex: male Admit Date: 5/24/2020 LOS: 10 days Subjective: F/U MRSA bacteremia, osteomyelitis 70 yo hx of DVT, HIT, a fib, morbid obesity on chronic 2-3L NC at home, sleep apnea, osteomyelitis, bradycardia s/p pacer, CKD stage III baseline creatine 2.4, s/p aortic valve replacement with bioprosthetic valve and CABG January of 2020, paroxysmal a fib on warfarin, chronic gangrenous toes, PVD, pacemaker, depression, carotid stenosis who presented to the ED for cc near syncope. Patient found to be in severe sepsis, pulmonary edema, DEJUAN, along with malodorous foot wounds. Recently had removal of right 4th and left 2nd and 3rd digits on May 15th by Dr. Basilio Martínez. Blood cultures growing MRSA. Wound cultures growing e faecalis, e coli, and MRSA. Repeat blood cultures on 5/28 no growth. COVID negative. Pulmonology also following for his pulmonary edema but no indication for thoracentesis. ID following. TERRI performed on 6/2/20 and found vegetations to his aortic valve along with pacemaker. Pacemaker interrogated and has atrial paced 0% and V paced 11% so may be a candidate for extraction. High risk for aortic valve replacement. Patient states he is wanting surgery so \"that he can die\" and \"not be a burden to my family. \" No chest pain or SOB. INR 1.9,  
Current Facility-Administered Medications Medication Dose Route Frequency  Vancomycin Trough Reminder   Other ONCE  warfarin (COUMADIN) tablet 5 mg  5 mg Oral QPM  
 fentaNYL citrate (PF) injection 25-50 mcg  25-50 mcg IntraVENous Multiple  midazolam (VERSED) injection 0.5-2 mg  0.5-2 mg IntraVENous Multiple  lidocaine (XYLOCAINE) 2 % viscous solution 15 mL  15 mL Mouth/Throat PRN  
 midazolam (VERSED) injection 0.5-2 mg  0.5-2 mg IntraVENous Multiple  fentaNYL citrate (PF) injection 25-50 mcg  25-50 mcg IntraVENous Multiple  vancomycin (VANCOCIN) 1,000 mg in 0.9% sodium chloride (MBP/ADV) 250 mL  1,000 mg IntraVENous Q24H  
 furosemide (LASIX) tablet 40 mg  40 mg Oral DAILY  polyethylene glycol (MIRALAX) packet 17 g  17 g Oral DAILY  bisacodyL (DULCOLAX) tablet 5 mg  5 mg Oral DAILY PRN  
 ondansetron (ZOFRAN) injection 4 mg  4 mg IntraVENous Q6H PRN  
 0.9% sodium chloride infusion 250 mL  250 mL IntraVENous PRN  
 lidocaine (XYLOCAINE) 10 mg/mL (1 %) injection 0.1 mL  0.1 mL SubCUTAneous PRN  
 sodium chloride (NS) flush 5-40 mL  5-40 mL IntraVENous Q8H  
 sodium chloride (NS) flush 5-40 mL  5-40 mL IntraVENous PRN  
 0.9% sodium chloride infusion 250 mL  250 mL IntraVENous PRN  
 ALPRAZolam (XANAX) tablet 0.5 mg  0.5 mg Oral BID PRN  
 famotidine (PEPCID) tablet 20 mg  20 mg Oral DAILY  insulin glargine (LANTUS) injection 25 Units  25 Units SubCUTAneous QHS  NUTRITIONAL SUPPORT ELECTROLYTE PRN ORDERS   Does Not Apply PRN  
 meropenem (MERREM) 500 mg in 0.9% sodium chloride (MBP/ADV) 50 mL  0.5 g IntraVENous Q8H  
 sodium chloride (NS) flush 5-40 mL  5-40 mL IntraVENous Q8H  
 sodium chloride (NS) flush 5-40 mL  5-40 mL IntraVENous PRN  
 acetaminophen (TYLENOL) tablet 650 mg  650 mg Oral Q4H PRN  
 albuterol (PROVENTIL VENTOLIN) nebulizer solution 2.5 mg  2.5 mg Nebulization Q6H RT  
 insulin lispro (HUMALOG) injection   SubCUTAneous AC&HS  
 buPROPion (WELLBUTRIN) tablet 75 mg  75 mg Oral BID  midodrine (PROAMATINE) tablet 10 mg  10 mg Oral TID  OLANZapine (ZyPREXA) tablet 5 mg  5 mg Oral QHS  mirtazapine (REMERON) tablet 15 mg  15 mg Oral QHS  sertraline (ZOLOFT) tablet 100 mg  100 mg Oral DAILY  tamsulosin (FLOMAX) capsule 0.4 mg  0.4 mg Oral DAILY Objective:  
 
Vitals:  
 06/03/20 0298 06/03/20 3283 06/03/20 0746 06/03/20 1248 BP: 119/61  143/77 118/73 Pulse: 71  79 89 Resp: 20 20 20 Temp: 97.8 °F (36.6 °C)  97.6 °F (36.4 °C) 97.4 °F (36.3 °C) SpO2: 98% 95% 92% 97% Weight:      
Height:      
  
  
Intake and Output: 
Current Shift: No intake/output data recorded. Last three shifts: 06/01 1901 - 06/03 0700 In: 104.1 [I.V.:104.1] Out: 1700 [Urine:1700] Physical Exam:  
General:  Alert, cooperative, no distress Eyes:  Conjunctivae/corneas clear. Ears:  Normal TMs and external ear canals both ears. Nose: Nares normal. Septum midline. Mouth/Throat: Lips, mucosa, and tongue normal.   
Neck:  no JVD. Back:   deferred Lungs:   Clear to auscultation bilaterally. Heart:  Regular rate and rhythm, S1, S2 normal  
Abdomen:   Soft, non-tender. Bowel sounds normal. Morbidly obese. Extremities: Wearing socks. Pulses: 2+ and symmetric all extremities. Skin: Did not assess feet today Lymph nodes: Cervical, supraclavicular, and axillary nodes normal.  
Neurologic: CNII-XII intact. Lab/Data Review: 
 
Recent Results (from the past 24 hour(s)) GLUCOSE, POC Collection Time: 06/02/20  4:45 PM  
Result Value Ref Range Glucose (POC) 131 (H) 65 - 100 mg/dL GLUCOSE, POC Collection Time: 06/02/20  8:35 PM  
Result Value Ref Range Glucose (POC) 184 (H) 65 - 100 mg/dL PROTHROMBIN TIME + INR Collection Time: 06/03/20  5:16 AM  
Result Value Ref Range Prothrombin time 22.4 (H) 12.0 - 14.7 sec INR 1.9    
CBC WITH AUTOMATED DIFF Collection Time: 06/03/20  5:16 AM  
Result Value Ref Range WBC 9.1 4.3 - 11.1 K/uL  
 RBC 3.56 (L) 4.23 - 5.6 M/uL HGB 9.8 (L) 13.6 - 17.2 g/dL HCT 31.9 (L) 41.1 - 50.3 % MCV 89.6 79.6 - 97.8 FL  
 MCH 27.5 26.1 - 32.9 PG  
 MCHC 30.7 (L) 31.4 - 35.0 g/dL  
 RDW 15.2 (H) 11.9 - 14.6 % PLATELET 075 711 - 311 K/uL MPV 9.8 9.4 - 12.3 FL ABSOLUTE NRBC 0.00 0.0 - 0.2 K/uL  
 DF AUTOMATED NEUTROPHILS 74 43 - 78 % LYMPHOCYTES 17 13 - 44 % MONOCYTES 5 4.0 - 12.0 % EOSINOPHILS 3 0.5 - 7.8 % BASOPHILS 1 0.0 - 2.0 % IMMATURE GRANULOCYTES 0 0.0 - 5.0 % ABS. NEUTROPHILS 6.8 1.7 - 8.2 K/UL  
 ABS. LYMPHOCYTES 1.5 0.5 - 4.6 K/UL  
 ABS. MONOCYTES 0.5 0.1 - 1.3 K/UL  
 ABS. EOSINOPHILS 0.3 0.0 - 0.8 K/UL  
 ABS. BASOPHILS 0.1 0.0 - 0.2 K/UL  
 ABS. IMM. GRANS. 0.0 0.0 - 0.5 K/UL METABOLIC PANEL, BASIC Collection Time: 06/03/20  5:16 AM  
Result Value Ref Range Sodium 139 136 - 145 mmol/L Potassium 3.9 3.5 - 5.1 mmol/L Chloride 100 98 - 107 mmol/L  
 CO2 34 (H) 21 - 32 mmol/L Anion gap 5 (L) 7 - 16 mmol/L Glucose 84 65 - 100 mg/dL BUN 35 (H) 8 - 23 MG/DL Creatinine 1.51 (H) 0.8 - 1.5 MG/DL  
 GFR est AA 59 (L) >60 ml/min/1.73m2 GFR est non-AA 48 (L) >60 ml/min/1.73m2 Calcium 8.7 8.3 - 10.4 MG/DL  
GLUCOSE, POC Collection Time: 06/03/20  7:42 AM  
Result Value Ref Range Glucose (POC) 82 65 - 100 mg/dL GLUCOSE, POC Collection Time: 06/03/20  7:42 AM  
Result Value Ref Range Glucose (POC) 77 65 - 100 mg/dL GLUCOSE, POC Collection Time: 06/03/20 11:29 AM  
Result Value Ref Range Glucose (POC) 208 (H) 65 - 100 mg/dL Assessment/ Plan: Active Problems: 
  Obesity, morbid ([facility] [address] (10/19/2018) Type 2 diabetes with nephropathy ([facility] [address] (8/26/2019) CAD (coronary artery disease) (1/10/2020) Overview: CABG (1/16/20):  LIMA to LAD. SVG to OM. SVG to PDA. S/P CABG x 3 (1/10/2020) Status post aortic valve replacement (1/10/2020) Overview: 1. AVR (1/16/20):  25 mm Intuity Jon Gutierres Valve. 2.  Echo (4/12/20):  EF 55-60%. Normal functioning AVR. MG 15. PG 26. Mild MR. Atrial fibrillation ([facility] [address] (1/13/2020) Overview: Post op CABG. HIT (heparin-induced thrombocytopenia) ([facility] [address] (1/23/2020) Hypotension (2/18/2020) DNR (do not resuscitate) (2/26/2020) Long term (current) use of anticoagulants (4/27/2020) Pacemaker (4/27/2020) Overview: Dual chamber Biotronik pacemaker placed (1/24/20): AV block post op AVR. Gangrene of toe of both feet ([facility] [address] (5/11/2020) PAD (peripheral artery disease) ([email or web address] (5/14/2020) Osteomyelitis ([email or web address] (5/24/2020) DEJUAN (acute kidney injury) ([email or web address] (5/24/2020) Pulmonary edema, noncardiac (5/25/2020) MRSA bacteremia - From foot wounds. Patient does not want further amputations. TERRI showed vegetation to aortic valve and pacer. Cardiology to discuss if pacer can be removed. Patient is going to speak with son on if he would want surgery for valve replacement. Plan for Vancomycin for minimum EOT 7/9. Also on meropenem since enterococcus and e coli from wound cultures until 6/24. Right foot infection - s/p debridement on 5/27. Wound care following. May need amputation once infection has cleared. Will need to follow up with Dr. Christina Clark as outpatient. As of now when reading notes, patient did not want further surgery DM type II  - A1C 9.6. Non compliant. Lantus 25 units, SS. Paroxysmal a fib - Warfarin. Daily INR. Depression - Wellbutrin. Olanzapine. Zoloft. Midodrine ECHO showed EF 55%, RV pressure 25-30mmHg, Long discussion with son about prognosis. He will speak to father and potentially decide tomorrow how they would like treatment. [email or web address] - Called about increased hypoxia, tachycardia, and SOB. At first thought was anxiety since CTA on lung exam. Xanax given and he is feeling better but still with significant SOB. Chest x ray showed fluid overload. Giving lasix 60mg IV STAT. Strict Is and Os. INR has been sub therapeutic for a few days so will also order STAT CT PE protocol. Order ABG. Placing on BiPAP. Patient is DNR, but  May need to be transferred to ICU if needing BIPAP for long period of time. Will see how does with IV lasix. I have attempted to call the son again to update him on prognosis but no answer. DVT prophylaxis - Warfarin. Signed By: Candido Washburn DO   
 Shannan 3, 2020

## 2020-06-03 NOTE — PROGRESS NOTES
[facility] CARDIOLOGY PROGRESS NOTE 
      
 
6/3/2020 11:26 AM 
 
Admit Date: 5/24/2020 Subjective: Worked with PT this AM, some dizziness and reports that he feels his mind is 'cloudy'. No other complaints at this time. ROS: 
Cardiovascular:  As noted above Objective:  
  
Vitals:  
 06/03/20 0149 06/03/20 1350 06/03/20 4332 06/03/20 1360 BP:  119/61  143/77 Pulse:  71  79 Resp:  20  20 Temp:  97.8 °F (36.6 °C)  97.6 °F (36.4 °C) SpO2: 96% 98% 95% 92% Weight:      
Height:      
 
 
Physical Exam: 
General-No Acute Distress Neck- supple, no JVD 
CV- regular rate and rhythm, + systolic ejection murmur Lung- clear bilaterally no wheezes Abd- soft, nontender, nondistended Ext- trace leg edema bilaterally. Skin- warm and dry Data Review:  
Recent Labs  
  06/03/20 
0516 06/02/20 
0600  138  
K 3.9 4.0  
BUN 35* 35* CREA 1.51* 1.42  
GLU 84 108* WBC 9.1 9.7 HGB 9.8* 9.3* HCT 31.9* 30.6*  264 INR 1.9 1.8 Assessment/Plan: Active Problems: 
  Obesity, morbid ([facility] [address] (10/19/2018) [facility] (8/26/2019) - per primary CAD (coronary artery disease) (1/10/2020) S/P CABG x 3 (1/10/2020) 
  - on warfarin, no chest pain, hemodynamically and electrically stable at this time   
  - can look to restart statin in future pending clinical course Bacteremia - GPC cluster 5/25/20 
  - cultures 5/28/20 NG 5 days - ABX per ID Status post aortic valve replacement (1/10/2020) - valve with mass no aortic side of prosthesis , not classic in appearance for infective endocarditis - will need long term ABX given bacteremia Atrial fibrillation ([facility] [address] (1/13/2020) - rates controlled, hemodynamics stable - INR 1.9 
  - hemodynamically and electrically stable HIT (heparin-induced thrombocytopenia) (HCC) (1/23/2020) 
  - on warfarin, INR 1.9 ; history of thrombosis Pacemaker (4/27/2020) - mass on pacer lead on TERRI 6/2/2020 
  - on ABX 
  - placed for severe symptomatic bradycardia on 1/24/2020 
  - reviewed interrogation: patient with underlying sinus rhythm , first interrogation since implant, A paced 0 % , V paced 11%. May be candidate for extract, will need to be discussed with EP.  
  - may warrant extraction of hardware Gangrene of toe of both feet ([address] (5/11/2020) - per vascular surgery Charmayne Galloway, DO 
6/3/2020 11:26 AM

## 2020-06-03 NOTE — PROGRESS NOTES
Tala Steele Admission Date: 5/24/2020 Daily Progress Note: 6/3/2020 Here with sepsis/ osteomyelitis and bilateral gangrenous toe. Septic shock improved, IV abx given and transferred to the floor. Vascular surgery consulted and debridement on 5/27. CXR consistent with acute pulmonary edema/CHF.  Has preserved LVEF on recent ECHO, hx of pA.fib. Given IV Lasix. Was admitted to ICU on BIPAP, DNR confirmed with severe sepsis, DEJUAN , presumed CHF and concern for osteomyelitis source with bilateral malodorous feet. Continued on Vancomycin, consulted intensivist, ID, cardiology, vascular, wound care. Was recently discharged by Dr. Stuart Gonzalez, vascular surgery service May 15 following right fourth left second and third toe amputations secondary to gangrene. Was discharged home on Midrin and Florinef. He is morbidly obesity with chronic 2-3 L home O2, bilateral carotid stenosis with CAD s/p CABG with AVR 1/10/20 by Dr. Tacho Molina complicated by CKD requiring HD, HIT +, DVTs, wound left thigh and pneumonia, PVD, DM with diabetic nephropathy. Chronic anticoagulation with warfarin for paroxysmal A. Fib. Subjective:  
92% on 4lpm.  Has been wearing BiPAP at night here. TERRI with mass on pacer lead and aorti side of prosthesis. May need pacer removed. ON IV abx. Pt considering his options for his endocarditis/PM infection, none of which seem good. Fluid balance -1.6L yesterday  & creatinine stable. Review of Systems Cardiovascular: positive for ESTRELLA Current Facility-Administered Medications Medication Dose Route Frequency  Vancomycin Trough Reminder   Other ONCE  warfarin (COUMADIN) tablet 5 mg  5 mg Oral QPM  
 fentaNYL citrate (PF) injection 25-50 mcg  25-50 mcg IntraVENous Multiple  midazolam (VERSED) injection 0.5-2 mg  0.5-2 mg IntraVENous Multiple  lidocaine (XYLOCAINE) 2 % viscous solution 15 mL  15 mL Mouth/Throat PRN  
  midazolam (VERSED) injection 0.5-2 mg  0.5-2 mg IntraVENous Multiple  fentaNYL citrate (PF) injection 25-50 mcg  25-50 mcg IntraVENous Multiple  vancomycin (VANCOCIN) 1,000 mg in 0.9% sodium chloride (MBP/ADV) 250 mL  1,000 mg IntraVENous Q24H  
 furosemide (LASIX) tablet 40 mg  40 mg Oral DAILY  polyethylene glycol (MIRALAX) packet 17 g  17 g Oral DAILY  bisacodyL (DULCOLAX) tablet 5 mg  5 mg Oral DAILY PRN  
 ondansetron (ZOFRAN) injection 4 mg  4 mg IntraVENous Q6H PRN  
 0.9% sodium chloride infusion 250 mL  250 mL IntraVENous PRN  
 lidocaine (XYLOCAINE) 10 mg/mL (1 %) injection 0.1 mL  0.1 mL SubCUTAneous PRN  
 sodium chloride (NS) flush 5-40 mL  5-40 mL IntraVENous Q8H  
 sodium chloride (NS) flush 5-40 mL  5-40 mL IntraVENous PRN  
 0.9% sodium chloride infusion 250 mL  250 mL IntraVENous PRN  
 ALPRAZolam (XANAX) tablet 0.5 mg  0.5 mg Oral BID PRN  
 famotidine (PEPCID) tablet 20 mg  20 mg Oral DAILY  insulin glargine (LANTUS) injection 25 Units  25 Units SubCUTAneous QHS  NUTRITIONAL SUPPORT ELECTROLYTE PRN ORDERS   Does Not Apply PRN  
 meropenem (MERREM) 500 mg in 0.9% sodium chloride (MBP/ADV) 50 mL  0.5 g IntraVENous Q8H  
 sodium chloride (NS) flush 5-40 mL  5-40 mL IntraVENous Q8H  
 sodium chloride (NS) flush 5-40 mL  5-40 mL IntraVENous PRN  
 acetaminophen (TYLENOL) tablet 650 mg  650 mg Oral Q4H PRN  
 albuterol (PROVENTIL VENTOLIN) nebulizer solution 2.5 mg  2.5 mg Nebulization Q6H RT  
 insulin lispro (HUMALOG) injection   SubCUTAneous AC&HS  
 buPROPion (WELLBUTRIN) tablet 75 mg  75 mg Oral BID  midodrine (PROAMATINE) tablet 10 mg  10 mg Oral TID  OLANZapine (ZyPREXA) tablet 5 mg  5 mg Oral QHS  mirtazapine (REMERON) tablet 15 mg  15 mg Oral QHS  sertraline (ZOLOFT) tablet 100 mg  100 mg Oral DAILY  tamsulosin (FLOMAX) capsule 0.4 mg  0.4 mg Oral DAILY Objective:  
 
Vitals:  
 06/03/20 0149 06/03/20 3719 06/03/20 7142 06/03/20 0702 BP:  119/61  143/77 Pulse:  71  79 Resp:  20  20 Temp:  97.8 °F (36.6 °C)  97.6 °F (36.4 °C) SpO2: 96% 98% 95% 92% Weight:      
Height:      
 
Intake and Output:  
06/01 1901 - 06/03 0700 In: 104.1 [I.V.:104.1] Out: 1700 [Urine:1700] No intake/output data recorded. Intake/Output Summary (Last 24 hours) at 6/3/2020 1215 Last data filed at 6/3/2020 7224 Gross per 24 hour Intake 104.14 ml Output 1700 ml Net -1595.86 ml Physical Exam:          
Constitutional: the patient is obese on 4 lpm 
HEENT: Sclera clear, pupils equal, oral mucosa moist 
Lungs: crackles in left base on 4 lpm 
Cardiovascular: IRR without M,G,R, PM 
Abd/GI: soft and non-tender; with positive bowel sounds. rounded Ext: warm without cyanosis. There is no lower leg edema. Musculoskeletal: moves all four extremities with equal strength, right toe wound Skin: no jaundice or rashes, toe on right foot with + wound Neuro: A & O X 3 Lines/Drains: PIV,  brown Nutrition: ADA CHEST XRAY: 
 
5/27 LAB Recent Labs  
  06/03/20 
0516 06/02/20 
0600 WBC 9.1 9.7 HGB 9.8* 9.3* HCT 31.9* 30.6*  264 Recent Labs  
  06/03/20 
0516 06/02/20 
0600 06/01/20 
1253  138  --   
K 3.9 4.0  --   
 100  --   
CO2 34* 33*  --   
GLU 84 108*  --   
BUN 35* 35*  --   
CREA 1.51* 1.42  --   
INR 1.9 1.8 1.7 Bilateral Ultra Sound done of the chest, pictures taken, placed in chart. Not enough fluid to warrant thoracentesis per Dr. Elieser Vera. Assessment:  
 
Hospital Problems  Date Reviewed: 5/27/2020 Codes Class Noted POA Pulmonary edema, noncardiac ICD-10-CM: J81.1 ICD-9-CM: 941  5/25/2020 Yes Osteomyelitis ([facility] [address] ICD-10-CM: M86.9 ICD-9-CM: 730.20  5/24/2020 Yes DEJUAN (acute kidney injury) ([facility] [address] ICD-10-CM: N17.9 ICD-9-CM: 584.9  5/24/2020 Yes PAD (peripheral artery disease) (HCC) (Chronic) ICD-10-CM: I73.9 ICD-9-CM: 443.9  5/14/2020 Yes Gangrene of toe of both feet ([facility] [address] ICD-10-CM: E06 
ICD-9-CM: 785.4  5/11/2020 Yes Long term (current) use of anticoagulants (Chronic) ICD-10-CM: Z79.01 
ICD-9-CM: V58.61  4/27/2020 Yes Pacemaker (Chronic) ICD-10-CM: Z95.0 ICD-9-CM: V45.01  4/27/2020 Yes Overview Signed 5/6/2020  9:46 PM by Nabor Perea MD  
  Dual chamber Biotronik pacemaker placed (1/24/20): AV block post op AVR. DNR (do not resuscitate) (Chronic) ICD-10-CM: X65 ICD-9-CM: V49.86  2/26/2020 Yes Hypotension (Chronic) ICD-10-CM: I95.9 ICD-9-CM: 458.9  2/18/2020 Yes HIT (heparin-induced thrombocytopenia) (HCC) (Chronic) ICD-10-CM: L02.36 ICD-9-CM: 289.84  1/23/2020 Yes Atrial fibrillation (HCC) (Chronic) ICD-10-CM: I48.91 
ICD-9-CM: 427.31  1/13/2020 Yes Overview Signed 5/6/2020  9:46 PM by Nabor Perea MD  
  Post op CABG. CAD (coronary artery disease) (Chronic) ICD-10-CM: I25.10 ICD-9-CM: 414.00  1/10/2020 Yes Overview Signed 5/6/2020  1:19 PM by Nabor Perea MD  
  CABG (1/16/20):  LIMA to LAD. SVG to OM. SVG to PDA. S/P CABG x 3 (Chronic) ICD-10-CM: Z95.1 ICD-9-CM: V45.81  1/10/2020 Yes Status post aortic valve replacement (Chronic) ICD-10-CM: Z95.2 ICD-9-CM: V43.3  1/10/2020 Yes Overview Addendum 5/6/2020  9:43 PM by Nabor Perea MD  
  1. AVR (1/16/20):  25 mm Intuity Jon Gutierres Valve. 2.  Echo (4/12/20):  EF 55-60%. Normal functioning AVR. MG 15. PG 26. Mild MR. 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 
ICD-9-CM: 250.40, 583.81  8/26/2019 Yes Obesity, morbid ([address] (Chronic) ICD-10-CM: E66.01 
ICD-9-CM: 278.01  10/19/2018 Yes Hx of AVR, PPM and presented with sepsis bacteremia and necrotic toe. He is s/p debridement and has been seen by ID. ABX given. Diuresed for gross volume overload. Encouraged to wear BiPAP.  TERRI ordered for bacteremia with valve. Weak, will need rehab after discharge. Has home BiPAP PLAN:  
Will repeat CXR tomorrow since still not back to baseline with hypoxia. Has chronic obscurement of L hemidiaphragm and ultrasound without clear window for thoracentesis. Continue BiPAP at night Considering PM removal given vegetation there Continue antibiotics Montior renal function on diuretic therapy.  
Clive Gagnon MD

## 2020-06-03 NOTE — PROGRESS NOTES
Problem: Mobility Impaired (Adult and Pediatric) Goal: *Acute Goals and Plan of Care (Insert Text) Description: LTG: 
(1.)Mr. Ajay Mares will move from supine to sit and sit to supine , scoot up and down and roll side to side in bed with MODIFIED INDEPENDENCE within 7 treatment day(s). (2.)Mr. Ajay aMres will transfer from bed to chair and chair to bed with MODIFIED INDEPENDENCE using the least restrictive device maintaining heel WBing R LE within 7 treatment day(s). (3.)Mr. Ajay Mares will ambulate with MODIFIED INDEPENDENCE for 50+ feet maintaining heel WBing R LE with the least restrictive device within 7 treatment day(s). ________________________________________________________________________________________________ Outcome: Progressing Towards Goal 
  
PHYSICAL THERAPY: Daily Note and AM 6/3/2020 INPATIENT: PT Visit Days : 3 Heel WBing R LE 
Payor: Tanesha Rosales / Plan: Via [employer] / Product Type: Coverity Care Medicare /   
  
NAME/AGE/GENDER: Nia Gutierrez is a 69 y.o. male PRIMARY DIAGNOSIS: Severe sepsis ([address] [A41.9, R65.20] Lactic acidosis [E87.2] Acute CHF (congestive heart failure) ([address] [I50.9] Osteomyelitis ([address] [M86.9] Hypotension [I95.9] Severe sepsis with septic shock (HCC) Severe sepsis with septic shock ([address] Procedure(s) (LRB): ULTRASOUND (Bilateral) 2 Days Post-Op ICD-10: Treatment Diagnosis:  
 · Other abnormalities of gait and mobility (R26.89) Precaution/Allergies: 
Heparin; Amoxicillin; and Keflex [cephalexin] ASSESSMENT:  
 
Mr. Ajay Mares recently (5/27/20) underwent debridement of R forefoot by vasculature surgery and needs to be heel WBing per chart. Patient reports that he ambulates with RW independently in home but does require assistance with sit to supine to lift his LE's onto the bed. Patient was supine upon contact and agreeable to PT.  Patient performs supine to sit with SBA and cues for technique. Patient transfers to standing with min assist and cues for hand placement. Once standing patient ambulates 30' with rolling walker, cues for heel weight bearing on RLE, and cues for sequencing/posture. Patient has difficulty maintaining heel weight bearing needing increased cues. Patient sits on EOB where he wants to urinate prior to sitting in recliner chair. Patient then transfers to standing and ambulates an additional 15' to recliner chair with same above assist and cues. Patient sits in recliner chair where he then participates in therapeutic strengthening exercises to improve functional strength for transfers, gait and overall mobility. Patient requires cues to perform exercises correctly. Overall slow progress towards physical therapy goals. Patient's goals listed above are still appropriate. Will continue skilled PT to address remaining deficits. This section established at most recent assessment PROBLEM LIST (Impairments causing functional limitations): 1. Decreased Transfer Abilities 2. Decreased Ambulation Ability/Technique 3. Decreased Balance 4. Decreased Activity Tolerance 5. Decreased Burson with Home Exercise Program 
 INTERVENTIONS PLANNED: (Benefits and precautions of physical therapy have been discussed with the patient.) 1. Balance Exercise 2. Bed Mobility 3. Gait Training 4. Therapeutic Activites 5. Therapeutic Exercise/Strengthening 6. Transfer Training 7. education TREATMENT PLAN: Frequency/Duration: 3 times a week for duration of hospital stay Rehabilitation Potential For Stated Goals: Good REHAB RECOMMENDATIONS (at time of discharge pending progress):   
Placement:  
It is my opinion, based on this patient's performance to date, that Mr. Jes Gaines may benefit from intensive therapy at a [address] [place] [address] after discharge due to the functional deficits listed above that are likely to improve with skilled rehabilitation and concerns that he/she may be unsafe to be unsupervised at home due to decreased mobility. Equipment:  
? Off-loading shoe. HISTORY:  
History of Present Injury/Illness (Reason for Referral): 
Per MD note, \"Chief complaint: Chills, dyspnea, fever, hypotension HPI: Morbidly obese patient on chronic 2-3 L home O2 recently discharged from this hospital by Dr. Rajan Garcia vascular surgery service May 15 following right fourth left second and third toe amputations secondary to gangrene. Patient had aortic valve replacement with a bioprosthetic aortic valve and postop was noted to have gangrenous toes and has significant peripheral vascular disease. Longstanding diabetes with diabetic nephropathy chronic kidney disease stage III with May 8 serum creatinine being 1.5. Chronic anticoagulation with warfarin for paroxysmal A. fib and recently aortic valve replacement but bioprosthetic. He has a permanent pacemaker, status post coronary artery bypass graft, bilateral carotid stenosis, and recent diagnosis of heparin-induced thrombocytopenia. He did see cardiology Friday2 days ago and claims medications were changed but he cannot tell me which ones. He cannot tell me the dose of his warfarin. I do have a discharge summary and reviewed MAR. He apparently was on the toilet and had near syncope, EMS was notified. Patient reports symptoms of dyspnea dyspnea on exertion chills and EMS reported documented fever but I could not locate documentation. On arrival to emergency department he is found to have a brain natruretic peptide of 4134 with recent prior being 2600 May 15. And chest radiographic appearance of bilateral alveolar edema consistent with acute pulmonary edema/CHF. He has preserved LVEF on his recent echocardiogram to evaluate his bioprosthetic aortic valve.   His EKG is read as sinus tach although P waves are not easily identified his rhythm is regular. He has a history of paroxysmal A. fib chronically. His serum lactate level is 3 he has acute kidney injury with serum creatinine 2.0 his glucose is 200. His hemoglobin is 8 white blood count is 15,000 with left shift. His ABG is not bad with pH of 7.37 PCO2 of 47 PaO2 of 67 on 3 L 92% but clinical decompensation since that time according to nursing in the ER. He is currently unable to speak complete sentences and pulse ox is below 90 on nasal cannula and blood pressure is borderline. I spoke with ER nurses and Turner catheter and 40 mg IV Lasix to be ordered. He is being admitted to the intensive care unit with severe sepsis with lactic acidosis and acute kidney injury likely osteomyelitis source has bilateral feet are malodorous. Imaging studies consistent with possible osteomyelitis. He also has acute pulmonary edema and borderline hypotension. He will be admitted to the unit despite DNR status due to requested use of BiPAP if needed, and possible pressor support. For now we will continue vancomycin as single agent but will seek opinion of intensivist and will consult ID, cardiology, vascular, wound care. Pharmacy will manage warfarinstat PT INR pending. Other significant lab data includes procalcitonin level at 8. He was not tested for COVID-19difficult to obtain adequate history due to patient respiratory distress at time of my evaluation. It is noted that he was discharged with Midrin as well as Florinef. Florinef will obviously be held but we may try Midrin to avoid need for pressor support. Will await evaluation from intensivist/critical care. Call placed\" Past Medical History/Comorbidities:  
Mr. Karol Avila  has a past medical history of Acute renal failure on dialysis [facility]) (2/25/2020), Arthritis, BPH (benign prostatic hyperplasia) (1/14/2013), CAD (coronary artery disease), DM type 2 (diabetes mellitus, type 2) ([facility] (dx 2004), Dyspnea, Gout (1/14/2013), HLD (hyperlipidemia) (1/14/2013), HTN (hypertension), Morbid obesity ([facility] (9/3/14), Oxygen dependent, Psychiatric disorder, Rheumatic fever, Seasonal allergic rhinitis, Severe aortic valve stenosis, Thyroid disease, and Unspecified sleep apnea (2016). Mr. Thomas Bass  has a past surgical history that includes hx tonsil and adenoidectomy; ir insert tunl cvc w port over 5 years (2/4/2020); hx heart catheterization (12/23/2019); vascular surgery procedure unlist; hx orthopaedic (Left); and hx cataract removal (Bilateral, 2012). Social History/Living Environment:  
Home Environment: Private residence # Steps to Enter: 2 One/Two Story Residence: One story Living Alone: No 
Support Systems: Spouse/Significant Other/Partner Patient Expects to be Discharged to[de-identified] Private residence Current DME Used/Available at Home: Oxygen, portable, Wheelchair, Nebulizer, Walker, [address] [employer] chair, Commode, bedside Prior Level of Function/Work/Activity: He recently (5/27/20) underwent debridement of R forefoot by vasculature surgery and needs to be heel WBing per chart. Patient reports that he ambulates with RW independently in home but does require assistance with sit to supine to lift his LE's onto the bed. Number of Personal Factors/Comorbidities that affect the Plan of Care: 3+: HIGH COMPLEXITY EXAMINATION:  
Most Recent Physical Functioning:  
Gross Assessment: 
  
         
  
Posture: 
  
Balance: 
Sitting: Intact Standing: Impaired Standing - Static: Fair Standing - Dynamic : Fair Bed Mobility: 
Supine to Sit: Stand-by assistance Wheelchair Mobility: 
  
Transfers: 
Sit to Stand: Minimum assistance Stand to Sit: Contact guard assistance Gait: 
Right Side Weight Bearing: Heel Base of Support: Center of gravity altered Speed/Federica: Slow Gait Abnormalities: Decreased step clearance;Trunk sway increased; Shuffling gait; Step to gait Distance (ft): 30 Feet (ft) Assistive Device: Walker, rolling;Gait belt Ambulation - Level of Assistance: Contact guard assistance Interventions: Safety awareness training; Tactile cues; Verbal cues Body Structures Involved: 1. Heart 2. Bones 3. Joints 4. Muscles Body Functions Affected: 1. Cardio 2. Movement Related 3. Skin Related Activities and Participation Affected: 1. Mobility 2. Self Care 3. Domestic Life Number of elements that affect the Plan of Care: 4+: HIGH COMPLEXITY CLINICAL PRESENTATION:  
Presentation: Evolving clinical presentation with changing clinical characteristics: MODERATE COMPLEXITY CLINICAL DECISION MAKIN[address] [place] [address] AM-PAC 6 Clicks Basic Mobility Inpatient Short Form How much difficulty does the patient currently have. .. Unable A Lot A Little None 1. Turning over in bed (including adjusting bedclothes, sheets and blankets)? [] 1   [] 2   [x] 3   [] 4  
2. Sitting down on and standing up from a chair with arms ( e.g., wheelchair, bedside commode, etc.)   [] 1   [] 2   [x] 3   [] 4  
3. Moving from lying on back to sitting on the side of the bed? [] 1   [] 2   [x] 3   [] 4 How much help from another person does the patient currently need. .. Total A Lot A Little None 4. Moving to and from a bed to a chair (including a wheelchair)? [] 1   [] 2   [x] 3   [] 4  
5. Need to walk in hospital room? [] 1   [x] 2   [] 3   [] 4  
6. Climbing 3-5 steps with a railing? [] 1   [x] 2   [] 3   [] 4  
© , Trustees of [address] [place] [address], under license to [employer]. All rights reserved Score:  Initial: 16 Most Recent: X (Date: -- ) Interpretation of Tool:  Represents activities that are increasingly more difficult (i.e. Bed mobility, Transfers, Gait). Medical Necessity:    
· Patient is expected to demonstrate progress in  
· functional technique and activity tolerance ·  to  
· increase independence with   and improve safety during all functional mobility · . 
Reason for Services/Other Comments: 
· Patient continues to require skilled intervention due to · medical complications and patient unable to attend/participate in therapy as expected · . Use of outcome tool(s) and clinical judgement create a POC that gives a: Clear prediction of patient's progress: LOW COMPLEXITY  
  
 
 
 
TREATMENT:  
(In addition to Assessment/Re-Assessment sessions the following treatments were rendered) Pre-treatment Symptoms/Complaints:  none. Pain: Initial:  
Pain Intensity 1: 0  Post Session:  0 Therapeutic Activity: (    25 Minutes): Therapeutic activities including bed mobility training, transfer training, ambulation on level ground, static/dynamic standing balance, scooting, posture training, instruction in heel weight bearing and sequencing with rolling walker, LE exercises, and patient education to improve mobility, strength and balance. Required moderate Safety awareness training; Tactile cues; Verbal cues to promote static and dynamic balance in standing and promote coordination of bilateral, lower extremity(s). Therapeutic Exercise: (  minutes):  Exercises per grid below to improve mobility and strength. Required minimal visual and verbal cues to promote proper body alignment. Progressed complexity of movement as indicated. DATE: 5/30/20 6/2/20 Ambulation Hip Flexion X20 AB 2x15B A Long Arc Quads X20 AB 2x15B A Hip ab/ad  2x15B A Heel Raises X20 AB 2x15B A Toe Raises X20 AB 2x15B A Key:  A=active, AA=active assisted, P=passive, B=bilaterally, R=right, L=left DF=dorsiflexion, PF=plantarflexion Braces/Orthotics/Lines/Etc:  
· O2 Device: Hi flow nasal cannula Treatment/Session Assessment:   
· Response to Treatment: see above · Interdisciplinary Collaboration:  
o Physical Therapy Assistant 
o Registered Nurse · After treatment position/precautions:  
o Up in chair o Bed/Chair-wheels locked 
o Call light within reach 
o RN notified · Compliance with Program/Exercises: Compliant all of the time, Will assess as treatment progresses · Recommendations/Intent for next treatment session: \"Next visit will focus on advancements to more challenging activities and reduction in assistance provided\". Total Treatment Duration: PT Patient Time In/Time Out Time In: 1 Time Out: 1030 Darylene Silos Binkley-Vance, PTA

## 2020-06-03 NOTE — PROGRESS NOTES
[address], [place]. [address] [facility] FAX: [phone] Vascular Surgery Progress Note Admit Date: 2020 POD 7 Days Post-Op Procedure:  Procedure(s): ULTRASOUND Subjective:  
 
Patient has no new complaints. Objective:  
 
Blood pressure 143/77, pulse 79, temperature 97.6 °F (36.4 °C), resp. rate 20, height 5' 10\" (1.778 m), weight 245 lb (111.1 kg), SpO2 92 %. Temp (24hrs), Av.1 °F (36.7 °C), Min:97.6 °F (36.4 °C), Max:98.7 °F (37.1 °C) Physical Exam:  GENERAL: alert, cooperative, no distress, appears stated age, LUNG:  diminished in the bases. , HEART:  regular rate and rhythm, S1, S2 normal, no murmur, click, rub or gallop and EXTREMITIES:  Right Lower Extremity:  mild edema, granulation tissue at the great toe amputation site. sutures in palce in the remaining incision line. and Left Lower Extremity:  dry gangrene to left distal end of the great toe. sutures in place to left 2nd-3rd toes Labs:  
Recent Labs  
  20 
3707 HGB 9.8* WBC 9.1 K 3.9 GLU 84 Data Review: images and reports reviewed Current Facility-Administered Medications Medication Dose Route Frequency  Vancomycin Trough Reminder   Other ONCE  warfarin (COUMADIN) tablet 5 mg  5 mg Oral QPM  
 fentaNYL citrate (PF) injection 25-50 mcg  25-50 mcg IntraVENous Multiple  midazolam (VERSED) injection 0.5-2 mg  0.5-2 mg IntraVENous Multiple  lidocaine (XYLOCAINE) 2 % viscous solution 15 mL  15 mL Mouth/Throat PRN  
 midazolam (VERSED) injection 0.5-2 mg  0.5-2 mg IntraVENous Multiple  fentaNYL citrate (PF) injection 25-50 mcg  25-50 mcg IntraVENous Multiple  vancomycin (VANCOCIN) 1,000 mg in 0.9% sodium chloride (MBP/ADV) 250 mL  1,000 mg IntraVENous Q24H  
 furosemide (LASIX) tablet 40 mg  40 mg Oral DAILY  polyethylene glycol (MIRALAX) packet 17 g  17 g Oral DAILY  bisacodyL (DULCOLAX) tablet 5 mg  5 mg Oral DAILY PRN  
  ondansetron (ZOFRAN) injection 4 mg  4 mg IntraVENous Q6H PRN  
 0.9% sodium chloride infusion 250 mL  250 mL IntraVENous PRN  
 lidocaine (XYLOCAINE) 10 mg/mL (1 %) injection 0.1 mL  0.1 mL SubCUTAneous PRN  
 sodium chloride (NS) flush 5-40 mL  5-40 mL IntraVENous Q8H  
 sodium chloride (NS) flush 5-40 mL  5-40 mL IntraVENous PRN  
 0.9% sodium chloride infusion 250 mL  250 mL IntraVENous PRN  
 ALPRAZolam (XANAX) tablet 0.5 mg  0.5 mg Oral BID PRN  
 famotidine (PEPCID) tablet 20 mg  20 mg Oral DAILY  insulin glargine (LANTUS) injection 25 Units  25 Units SubCUTAneous QHS  NUTRITIONAL SUPPORT ELECTROLYTE PRN ORDERS   Does Not Apply PRN  
 meropenem (MERREM) 500 mg in 0.9% sodium chloride (MBP/ADV) 50 mL  0.5 g IntraVENous Q8H  
 sodium chloride (NS) flush 5-40 mL  5-40 mL IntraVENous Q8H  
 sodium chloride (NS) flush 5-40 mL  5-40 mL IntraVENous PRN  
 acetaminophen (TYLENOL) tablet 650 mg  650 mg Oral Q4H PRN  
 albuterol (PROVENTIL VENTOLIN) nebulizer solution 2.5 mg  2.5 mg Nebulization Q6H RT  
 insulin lispro (HUMALOG) injection   SubCUTAneous AC&HS  
 buPROPion (WELLBUTRIN) tablet 75 mg  75 mg Oral BID  midodrine (PROAMATINE) tablet 10 mg  10 mg Oral TID  OLANZapine (ZyPREXA) tablet 5 mg  5 mg Oral QHS  mirtazapine (REMERON) tablet 15 mg  15 mg Oral QHS  sertraline (ZOLOFT) tablet 100 mg  100 mg Oral DAILY  tamsulosin (FLOMAX) capsule 0.4 mg  0.4 mg Oral DAILY Assessment:  
 
Active Problems: 
  Obesity, morbid ([facility] (10/19/2018) Type 2 diabetes with nephropathy ([facility] (8/26/2019) CAD (coronary artery disease) (1/10/2020) Overview: CABG (1/16/20):  LIMA to LAD. SVG to OM. SVG to PDA. S/P CABG x 3 (1/10/2020) Status post aortic valve replacement (1/10/2020) Overview: 1. AVR (1/16/20):  25 mm Intuity Jon Gutierres Valve. 2.  Echo (4/12/20):  EF 55-60%. Normal functioning AVR. MG 15. PG 26. Mild MR. Atrial fibrillation ([facility] (1/13/2020) Overview: Post op CABG. HIT (heparin-induced thrombocytopenia) ([address] (1/23/2020) Hypotension (2/18/2020) DNR (do not resuscitate) (2/26/2020) Long term (current) use of anticoagulants (4/27/2020) Pacemaker (4/27/2020) Overview: Dual chamber Biotronik pacemaker placed (1/24/20): AV block post op AVR. Gangrene of toe of both feet ([address] (5/11/2020) PAD (peripheral artery disease) ([address] (5/14/2020) Osteomyelitis ([address] (5/24/2020) DEJUAN (acute kidney injury) ([facility] (5/24/2020) Pulmonary edema, noncardiac (5/25/2020) Plan/Recommendations/Medical Decision Making:  
 
Continue present treatment  
-Wound care-Right foot toe amputation site: irrigate. Wet to dry dressing changes BID-discussed with wound care nurse, Cozetta Schilder -Left toe amputation sites. Clean with soap and water and/or wound cleanser daily- remove sutures to left 2nd and 3rd toe amputation site 
-PT/OT 
-ok to d/c when medically stable from a vascular standpoint Signed By: Erlinda Willson NP   
 Shannan 3, 2020

## 2020-06-04 NOTE — PROGRESS NOTES
Spoke with RN, she stated he had a really bad late yesterday afternoon and last night-he is very fatigued/tired. Attempted today to see patient twice- patient not feeling up to it. Will try again tomorrow. Villa Perez PTA

## 2020-06-04 NOTE — PROGRESS NOTES
Progress Note Patient: Natasha Rutledge MRN: [id number]  SSN: [id number] YOB: 1945  Age: 69 y.o. Sex: male Admit Date: 5/24/2020 LOS: 11 days Subjective: F/U MRSA bacteremia 68 yo hx of DVT, HIT, a fib, morbid obesity on chronic 2-3L NC at home, sleep apnea, osteomyelitis, bradycardia s/p pacer, CKD stage III baseline creatine 2.4, s/p aortic valve replacement with bioprosthetic valve and CABG January of 2020, paroxysmal a fib on warfarin, chronic gangrenous toes, recent decubitus ulcer that has healed,  PVD, pacemaker, depression, carotid stenosis who presented to the ED for cc near syncope. Patient found to be in severe sepsis, pulmonary edema, DEJUAN, along with malodorous foot wounds. Recently had removal of right 4th and left 2nd and 3rd digits on May 15th by Dr. Savannah Nunez. Blood cultures growing MRSA. Wound cultures growing e faecalis, e coli, and MRSA. Repeat blood cultures on 5/28 no growth. COVID negative. Pulmonology also following for his pulmonary edema but no indication for thoracentesis. ID following. TERRI performed on 6/2/20 and found vegetations to his aortic valve along with pacemaker. Pacemaker interrogated and has atrial paced 0% and V paced 11% so may be a candidate for extraction. High risk for aortic valve replacement. On 6/3/20 had acute episode of hypoxia. CT chest PE protocol showed no PE. Fluid seen on chest x ray and BiPAP placed along with lasix which improved SOB. Patient now on 3L NC from 15L. Patient and son states family is wanting hospice but trying to decide if wants home or at facility. INR 2.4. No SOB or chest pain. Current Facility-Administered Medications Medication Dose Route Frequency  warfarin (COUMADIN) tablet 4 mg  4 mg Oral QPM  
 vancomycin (VANCOCIN) 750 mg in  mL infusion  750 mg IntraVENous Q24H  
 furosemide (LASIX) tablet 40 mg  40 mg Oral DAILY  0.9% sodium chloride infusion  100 mL/hr IntraVENous PRN  
  fentaNYL citrate (PF) injection 25-50 mcg  25-50 mcg IntraVENous Multiple  midazolam (VERSED) injection 0.5-2 mg  0.5-2 mg IntraVENous Multiple  lidocaine (XYLOCAINE) 2 % viscous solution 15 mL  15 mL Mouth/Throat PRN  
 midazolam (VERSED) injection 0.5-2 mg  0.5-2 mg IntraVENous Multiple  fentaNYL citrate (PF) injection 25-50 mcg  25-50 mcg IntraVENous Multiple  polyethylene glycol (MIRALAX) packet 17 g  17 g Oral DAILY  bisacodyL (DULCOLAX) tablet 5 mg  5 mg Oral DAILY PRN  
 ondansetron (ZOFRAN) injection 4 mg  4 mg IntraVENous Q6H PRN  
 0.9% sodium chloride infusion 250 mL  250 mL IntraVENous PRN  
 lidocaine (XYLOCAINE) 10 mg/mL (1 %) injection 0.1 mL  0.1 mL SubCUTAneous PRN  
 sodium chloride (NS) flush 5-40 mL  5-40 mL IntraVENous Q8H  
 sodium chloride (NS) flush 5-40 mL  5-40 mL IntraVENous PRN  
 0.9% sodium chloride infusion 250 mL  250 mL IntraVENous PRN  
 ALPRAZolam (XANAX) tablet 0.5 mg  0.5 mg Oral BID PRN  
 famotidine (PEPCID) tablet 20 mg  20 mg Oral DAILY  insulin glargine (LANTUS) injection 25 Units  25 Units SubCUTAneous QHS  NUTRITIONAL SUPPORT ELECTROLYTE PRN ORDERS   Does Not Apply PRN  
 meropenem (MERREM) 500 mg in 0.9% sodium chloride (MBP/ADV) 50 mL  0.5 g IntraVENous Q8H  
 sodium chloride (NS) flush 5-40 mL  5-40 mL IntraVENous Q8H  
 sodium chloride (NS) flush 5-40 mL  5-40 mL IntraVENous PRN  
 acetaminophen (TYLENOL) tablet 650 mg  650 mg Oral Q4H PRN  
 albuterol (PROVENTIL VENTOLIN) nebulizer solution 2.5 mg  2.5 mg Nebulization Q6H RT  
 insulin lispro (HUMALOG) injection   SubCUTAneous AC&HS  
 buPROPion (WELLBUTRIN) tablet 75 mg  75 mg Oral BID  midodrine (PROAMATINE) tablet 10 mg  10 mg Oral TID  OLANZapine (ZyPREXA) tablet 5 mg  5 mg Oral QHS  mirtazapine (REMERON) tablet 15 mg  15 mg Oral QHS  sertraline (ZOLOFT) tablet 100 mg  100 mg Oral DAILY  tamsulosin (FLOMAX) capsule 0.4 mg  0.4 mg Oral DAILY Objective: Vitals:  
 06/04/20 0729 06/04/20 0734 06/04/20 1139 06/04/20 1442 BP:  106/52 117/55 Pulse:  86 93 Resp:  25 22 Temp:  98.3 °F (36.8 °C) 98.6 °F (37 °C) SpO2: 99% 97% 98% 98% Weight:      
Height:      
  
  
Intake and Output: 
Current Shift: 06/04 0701 - 06/04 1900 In: -  
Out: 500 [Urine:500] Last three shifts: 06/02 1901 - 06/04 0700 In: 104.1 [I.V.:104.1] [facility]: 8209 [id number] Physical Exam:  
General:  Alert, cooperative, no distress. Telling me he is wanting hospice Eyes:  Conjunctivae/corneas clear. Ears:  Normal TMs and external ear canals both ears. Nose: Nares normal. Septum midline. Mouth/Throat: Lips, mucosa, and tongue normal.   
Neck:  no JVD. Back:   deferred Lungs:   Minimal inspiratory crackles at LLL Heart:  Regular rate and rhythm, S1, S2 normal  
Abdomen:   Soft, non-tender. Bowel sounds normal. Morbidly obese. Extremities: Healing food wounds. Right medial area of foot with healing ulcer. Only 5th digit present on right foot. Pulses: 2+ and symmetric all extremities. Skin: As above. Lymph nodes: Cervical, supraclavicular, and axillary nodes normal.  
Neurologic: CNII-XII intact. Lab/Data Review: 
 
Recent Results (from the past 24 hour(s)) GLUCOSE, POC Collection Time: 06/03/20  4:32 PM  
Result Value Ref Range Glucose (POC) 135 (H) 65 - 100 mg/dL CULTURE, BLOOD Collection Time: 06/03/20  5:16 PM  
Result Value Ref Range Special Requests: RIGHT Antecubital 
ARM Culture result: NO GROWTH AFTER 13 HOURS    
CULTURE, BLOOD Collection Time: 06/03/20  5:17 PM  
Result Value Ref Range Special Requests: LEFT 
HAND Culture result: NO GROWTH AFTER 13 HOURS    
POC G3 Collection Time: 06/03/20  7:31 PM  
Result Value Ref Range  Device: High Flow Nasal Cannula    
 pH (POC) 7.576 (HH) 7.35 - 7.45    
 pCO2 (POC) 34.9 (L) 35 - 45 MMHG  
 pO2 (POC) 60 (L) 75 - 100 MMHG  
 HCO3 (POC) 32.5 (H) 22 - 26 MMOL/L  
 sO2 (POC) 94 (L) 95 - 98 % Base excess (POC) 10 mmol/L Allens test (POC) YES Site RIGHT RADIAL Specimen type (POC) ARTERIAL Performed by Feliberto   
 CO2, POC 34 MMOL/L Flow rate (POC) 15.000 L/min Respiratory comment: NurseNotified COLLECT TIME 1,925 GLUCOSE, POC Collection Time: 06/03/20  8:26 PM  
Result Value Ref Range Glucose (POC) 168 (H) 65 - 100 mg/dL PROTHROMBIN TIME + INR Collection Time: 06/04/20  6:17 AM  
Result Value Ref Range Prothrombin time 26.6 (H) 12.0 - 14.7 sec INR 2.4 GLUCOSE, POC Collection Time: 06/04/20  7:29 AM  
Result Value Ref Range Glucose (POC) 137 (H) 65 - 100 mg/dL CBC WITH AUTOMATED DIFF Collection Time: 06/04/20 10:32 AM  
Result Value Ref Range WBC 19.1 (H) 4.3 - 11.1 K/uL  
 RBC 3.49 (L) 4.23 - 5.6 M/uL HGB 9.6 (L) 13.6 - 17.2 g/dL HCT 32.1 (L) 41.1 - 50.3 % MCV 92.0 79.6 - 97.8 FL  
 MCH 27.5 26.1 - 32.9 PG  
 MCHC 29.9 (L) 31.4 - 35.0 g/dL  
 RDW 15.7 (H) 11.9 - 14.6 % PLATELET 093 339 - 852 K/uL MPV 10.6 9.4 - 12.3 FL ABSOLUTE NRBC 0.00 0.0 - 0.2 K/uL  
 DF AUTOMATED NEUTROPHILS 88 (H) 43 - 78 % LYMPHOCYTES 8 (L) 13 - 44 % MONOCYTES 3 (L) 4.0 - 12.0 % EOSINOPHILS 0 (L) 0.5 - 7.8 % BASOPHILS 1 0.0 - 2.0 % IMMATURE GRANULOCYTES 1 0.0 - 5.0 %  
 ABS. NEUTROPHILS 16.8 (H) 1.7 - 8.2 K/UL  
 ABS. LYMPHOCYTES 1.5 0.5 - 4.6 K/UL  
 ABS. MONOCYTES 0.6 0.1 - 1.3 K/UL  
 ABS. EOSINOPHILS 0.0 0.0 - 0.8 K/UL  
 ABS. BASOPHILS 0.1 0.0 - 0.2 K/UL  
 ABS. IMM. GRANS. 0.1 0.0 - 0.5 K/UL METABOLIC PANEL, BASIC Collection Time: 06/04/20 10:32 AM  
Result Value Ref Range Sodium 136 136 - 145 mmol/L Potassium 4.5 3.5 - 5.1 mmol/L Chloride 98 98 - 107 mmol/L  
 CO2 34 (H) 21 - 32 mmol/L Anion gap 4 (L) 7 - 16 mmol/L Glucose 193 (H) 65 - 100 mg/dL BUN 51 (H) 8 - 23 MG/DL Creatinine 2.17 (H) 0.8 - 1.5 MG/DL  
 GFR est AA 39 (L) >60 ml/min/1.73m2 GFR est non-AA 32 (L) >60 ml/min/1.73m2 Calcium 7.9 (L) 8.3 - 10.4 MG/DL  
GLUCOSE, POC Collection Time: 06/04/20 11:35 AM  
Result Value Ref Range Glucose (POC) 218 (H) 65 - 100 mg/dL Assessment/ Plan: Active Problems: 
  Obesity, morbid ([address] (10/19/2018) Type 2 diabetes with nephropathy ([address] (8/26/2019) CAD (coronary artery disease) (1/10/2020) Overview: CABG (1/16/20):  LIMA to LAD. SVG to OM. SVG to PDA. Acute respiratory failure with hypoxemia ([address] (1/10/2020) S/P CABG x 3 (1/10/2020) Status post aortic valve replacement (1/10/2020) Overview: 1. AVR (1/16/20):  25 mm Intuity Jon Gutierres Valve. 2.  Echo (4/12/20):  EF 55-60%. Normal functioning AVR. MG 15. PG 26. Mild MR. Atrial fibrillation ([address] (1/13/2020) Overview: Post op CABG. HIT (heparin-induced thrombocytopenia) ([address] (1/23/2020) Hypotension (2/18/2020) DNR (do not resuscitate) (2/26/2020) Long term (current) use of anticoagulants (4/27/2020) Pacemaker (4/27/2020) Overview: Dual chamber Biotronik pacemaker placed (1/24/20): AV block post op AVR. Gangrene of toe of both feet ([address] (5/11/2020) PAD (peripheral artery disease) ([address] (5/14/2020) Osteomyelitis ([address] (5/24/2020) DEJUAN (acute kidney injury) ([address] (5/24/2020) Pulmonary edema, noncardiac (5/25/2020) MRSA bacteremia - From foot wounds versus possible old sacral wound. Patient does not want further amputations. TERRI showed vegetation to aortic valve and pacer. Patient states he does not want surgery for pacer or aortic valve replacement stating he thinks he will die anyway. Plan for Vancomycin for minimum EOT 7/9. Also on meropenem since enterococcus and e coli from wound cultures until 6/24. Son and patient agree to hospice but unsure if want at facility or at home. I have spoken to case management and appreciate their help. Right foot infection - s/p debridement on 5/27. Wound care following. DM type II  - A1C 9.6. Non compliant. Lantus 25 units, SS. Paroxysmal a fib - Warfarin. Daily INR. Therapeutic today. Depression - Wellbutrin. Olanzapine. Zoloft. Midodrine ECHO showed EF 55%, RV pressure 25-30mmHg, Long discussion with son and patient. They would like hospice. Consult hospice. They want to continue antibiotics until hospice official at facility or home. DVT prophylaxis - Warfarin. Signed By: Geni Tay DO   
 June 4, 2020

## 2020-06-04 NOTE — ADT AUTH CERT NOTES
COVID 19 Results by Frederic Cedillo RN  
 
   
Review Status Review Entered In Primary 6/2/2020 09:31  
   
Criteria Review Is the illness suspected to be related to the Coronavirus (COVID-19)? For placement Yes, No or Other Has the member been tested for the COVID-19? Yes Yes or No 
If Yes, what are the results of the COVID-19? Negative Positive, Negative or Pending 
  
   
   
COVID 19 by Frederic Cedillo RN  
 
   
Review Status Review Entered In Primary 6/1/2020 12:24  
   
Criteria Review Is the illness suspected to be related to the Coronavirus (COVID-19)? Other for placement Yes, No or Other Has the member been tested for the COVID-19? Yes Yes or No 
If Yes, what are the results of the COVID-19? Pending Positive, Negative or Pending What is the severity of the members condition (i.e. Isolation, Ventilator use)? Isolation 
   
   
Sepsis and Other Febrile Illness, without Focal Infection - Care Day 1 (5/29/2020) by Frederic Cedillo RN  
 
   
Review Status Review Entered Completed 6/1/2020 12:07  
   
Criteria Review Care Day: 1 Care Date: 5/29/2020 Level of Care: Inpatient Floor Guideline Day 1 Level Of Care (X) ICU [F] or floor Clinical Status   
(X) * Clinical Indications met [G]   
6/1/2020 12:07:53 EDT by Chuy Xavier   
  70 y/o admitted with sepsis, Lactic acidosis and Acute CHF Routes (X) Diet as tolerated 6/1/2020 12:07:53 EDT by Chuy Xavier   
  Diet diabetic consistent carb regular Interventions (X) WBC, cultures, chemistries, urinalysis, CXR, other imaging as indicated 6/1/2020 12:07:53 EDT by Chuy Xavier   
  WBC 9.3 * Milestone Additional Notes The patient's chart is reviewed and the patient is discussed with the staff. [facility] with sepsis/ osteomyelitis of right foot.  He was in septic shock on admission. [facility] recently underwent amputation of toes right foot per. Dr. Basilio Martínez. Wound culture with ESBL E coli, MRSA and Enterococcus and blood culture with MRSA. ID following. Patient on 6 week course Vanc and Merrem for E Coli.  Required short stay in ICU for [address] developed acute pulmonary edema/CHF with volume resuscitation.  Has preserved LVEF on recent ECHO, hx of pA.fib.  On lasix now with improvement.   
   
He is morbidly obesity with chronic 2-3 L home O2, CPAP for JOAQUIM,  bilateral carotid stenosis with CAD s/p CABG with AVR 1/10/20 by Dr. Roman Shelby complicated by CKD requiring HD, HIT +, DVTs, wound left thigh and pneumonia, PVD, DM with diabetic nephropathy. Chronic anticoagulation with warfarin for paroxysmal A. Fib.   
   
Subjective:  
   \"Have knuckles now! \".   Hands less swollen. Less short of breath. Lives with his wife. Has not been out of bed, ambulated. Wearing hospital bipap.   
   
Objective:  
   
Vitals:  
  05/29/20 0732 05/29/20 0759 05/29/20 0900 05/29/20 1224 BP:   123/60   (!) 153/91 Pulse:   71   89 Resp:   20   20 Temp:   98.2 °F (36.8 °C)   98.5 °F (36.9 °C) SpO2: 96% 98% 97% 92% Weight:          
Height:          
   
Intake and Output:   
05/27 1901 - 05/29 0700 In: 362 Out: 4175 [[id number]] 05/29 0701 - 05/29 1900 In: 120 [P.O.:120] Out: 1600 [Urine:1600]  
  Physical Exam:   
Constitutional:  the patient is well developed and in no acute distress HEENT:  Sclera clear, pupils equal, oral mucosa moist  
Lungs: crackles right base. Distant breath sounds on the left. Wearing 5 liter cannula Cardiovascular:  RRR without M,G,R  
Abd/GI: soft and non-tender; with positive bowel sounds. Ext: warm without cyanosis. There is no lower leg edema. Musculoskeletal: moves all four extremities with equal strength Skin:  no jaundice or rashes, right foot wound with dressing Neuro: no gross neuro deficits Musculoskeletal: ? can ambulate - has not been OOB. No deformity Psychiatric: Calm.   
   
 Review of Systems - Review of Systems Respiratory: Positive for shortness of breath.    
Cardiovascular: Negative.    
Gastrointestinal: Negative.    
Genitourinary:   
     Currently with brown Psychiatric/Behavioral: Positive for depression.   
   
Lines:  Brown, peripheral IV Medications,  
furosemide (LASIX) injection 40 mg   
Dose: 40 mg  
Freq: 2 TIMES DAILY Route: IV Start: 05/29/20 1800 End: 05/31/20 0942  
  
furosemide (LASIX) injection 80 mg   
Dose: 80 mg  
Freq: 2 TIMES DAILY Route: IV Start: 05/28/20 0900 End: 05/29/20 1006  
  
meropenem (MERREM) 500 mg in 0.9% sodium chloride (MBP/ADV) 50 mL Dose: 0.5 g  
Freq: EVERY 8 HOURS Route: IV Start: 05/25/20 1200  
  
midodrine (PROAMATINE) tablet 10 mg   
Dose: 10 mg  
Freq: 3 TIMES DAILY Route: PO  
Start: 05/24/20 1919  
  
albuterol (PROVENTIL VENTOLIN) nebulizer solution 2.5 mg   
Dose: 2.5 mg  
Freq: EVERY 6 HOURS RESP Route: NEBULIZATION Start: 05/24/20 2000  
 Order specific questions: MODE OF DELIVERY Nebulizer  
  
  
   
Plan:  (Medical Decision Making) 1. Continue IV lasix - 40 mg BID. Fluid balance neg 3.2 liters over past 24 hours. Labs reviewed. Weight up 6 lbs during admission. Monitor fluid balance, wt, labs, oxygen needs, blood pressure 2. Dullness/left pleural effusion - oxygen down to 5 liters with diuresis. Consider US left chest to further assess effusion. INR down to 1.8 with coumadin on hold 3. PT consult. Checked with vascular surgery and it is OK for patient to ambulate on the heel of right foot. He lives with his wife - ? Need for rehab 4. 6 week course Vanc per ID - repeat blood cultures from yesterday pending. Also on Merrem for foot wound - where will he go from here? Has been to [facility] before 5. Leave [place] for now - still on IV lasix 6. Using hospital Bipap with sleep - has home machine  
   
   
Lungs:  Clear, 4L NC Heart:  RRR with no Murmur/Rubs/Gallops  
   
 Additional Comments:  Improving with diuresis. BIPAP with sleep, has one at home and states he will start using again when discharged. No plan for thoracentesis at this time. Would aggressively control volume. Will see Monday.  Call with questions over weekend.

## 2020-06-04 NOTE — PROGRESS NOTES
Palliative Care Progress Note Patient: Shivani Pollard MRN: [id number]  SSN: [id number] YOB: 1945  Age: 69 y.o. Sex: male Assessment/Plan: Chief Complaint/Interval History: pt with respiratory distress and fevers yesterday evening. Feels breathing improved some this am. 
 
Principal Diagnosis: · Dyspnea  R06.00 Additional Diagnoses: · Acute Respiratory Failure, Unspecified  J96.00 · Debility, Unspecified  R53.81 
· Frailty  R54 · Counseling, Encounter for Medical Advice  Z71.9 
· Encounter for Palliative Care  Z51.5 Palliative Performance Scale (PPS) PPS: 40 Medical Decision Making:  
Reviewed and summarized labs and imaging. Met with pt at bedside and spoke with pt son via phone. Updated on current condition and concerns. Currently appears to be a lack of oral abx options for long term treatment per ID. Pt would not likely tolerate invasive surgery for valve and not clear if sx has been offered. Son feels pt would not have quality of life requiring infusions to treat infection. Family would like clarification on treatment options including potential procedures and abx. If unable to clear infection and required infusions son feels pt would opt for hospice and refuse further abx treatment. Will clarify all treatment options. Will discuss findings with members of the interdisciplinary team.   
 
More than 50% of this 35 minute visit was spent counseling and coordination of care as outlined above. Subjective:  
 
Review of Systems negative with the exception of as noted above Objective:  
 
Visit Vitals /52 (BP 1 Location: Right arm, BP Patient Position: At rest) Pulse 86 Temp 98.3 °F (36.8 °C) Resp 25 Ht 5' 10\" (1.778 m) Wt 235 lb (106.6 kg) SpO2 97% BMI 33.72 kg/m² Physical Exam: 
 
General:  Cooperative. No acute distress. Eyes:  Conjunctivae/corneas clear Nose: Nares normal. Septum midline. Neck: Supple, symmetrical, trachea midline, no JVD Lungs:   Coarse bilateral bs Heart:  Regular rate and rhythm, no murmur Abdomen:   Soft, non-tender, non-distended Extremities: Multiple rt toe amputations Skin: Skin color, texture, turgor normal. No rash or lesions. Neurologic: Nonfocal  
Psych: Alert and oriented Signed By: Omar Butler MD   
 June 4, 2020

## 2020-06-04 NOTE — PROGRESS NOTES
Warfarin dosing per pharmacist 
 
Ahmet Carranza is a 69 y.o. male. Height: 5' 10\" (177.8 cm)    Weight: 106.6 kg (235 lb) Indication: Bioprosthetic aortic valve (25 mm Gutierres-Jon Intuity valve placed 1/10/2020), AFib after CABG and AVR in January 2020, HIT + during admission January 2020, Hx of DVT during admission January 2020 Goal INR:  2.5 to 3.5 per most recent [facility] cardiology coumadin clinic note from 5/6/2020, but goal 2 to 3 per Dr. Munira Gates visit on 5/19/2020 Per discussion with Dr. Stacey Morrow (Cardiology) on 6/3/2020, will target goal 2 to 3 for now. Home dose:  5 mg Sun, Tues, Thurs, 7.5 mg all other days of the week (45 mg total weekly dose) Risk factors or significant drug interactions:  None Other anticoagulants:  N/A *History of HIT Daily Monitoring Date  INR     Warfarin dose HGB              Notes 5/25  7.4  Hold  8.5 2.5 mg PO Vit K admin 5/26  >9.9  Hold  7.0 10 mg PO Vit K admin 5/27  2.4  Hold  6.8        1 unit PRBC transfused 5/28  2  Hold  7.6  1 unit PRBC transfused 5/29  1.8  5 mg  9.5 5/30  1.2  5 mg   10.2 
5/31  1.3  5 mg   9.9 6/1  1.7  5 mg  -- 
6/2  1.8  7.5 mg  9.3 6/3  1.9  5 mg  9.8 
6/4  2.4  4 mg  ---- Patient with labile INRs since admission, increased to > 9.9 on 5/26. INR increased significantly overnight to 2.4. Will decrease this evening's dose by 1mg. Warfarin 4mg this evening. Noted that cardiology plans to have pacer interrogated to evaluate if it would be possible to remove pacer due to vegetation found on right ventricular lead. Aware that warfarin would likely need to be held for this. Please alert pharmacy if decision is made to attempt pacer removal. 
 
Pharmacy will continue to follow patient and monitor INR daily. Thank you, Colin Clancy Clinical Pharmacist 
[phone]

## 2020-06-04 NOTE — PROGRESS NOTES
Infectious Disease Progress Note Today's Date: 2020 Admit Date: 2020 Impression: · MRSA bacteremia with AVIE and vegetation on PPM lead  (, ): source would appear to be R foot that ultimately contaminated PPM 
· Fevers and Dyspnea overnight-may need Bipap · R foot infection s/p earlier amputations for gangrene. Site cx   ESBL E Coli, E Faecalis, MRSA. S/p debridement () · PVD with recent amputations gangrenous toes on both right and left foots. X-ray with bony changes on R but given recent surgery unclear this represents OM. If has amputation/BKA then will not be an issue for treatment duration. · He reports PCN allergy as feeling strange after IM PCN injection when he was in [facility] and Keflex and rash. Overall doubt these are major allergies. Plan:  
· Continue Vancomycin--Duration 6-8 weeks minimum (EOT ). Unfortunately MRSA specimen in resistant to ALL oral abx except Zyvox (which is not a long term abx option). ·  Continue Meropenem (change to ertapenem for outpt ease) for 4 weeks( )  to treat ESBL E coli. · Follow repeat BCs from yesterday as he had a new fever. · Palliative discussing goals with patient and his son. · Dispo: SNF Anti-infectives:  
Vancomycin - Clindamycin - Levaquin-->Edd Subjective:  
Lethargic, breathing ok for now on high flow O2. Review of Systems:  A comprehensive review of systems was negative except for that written in the History of Present Illness. Objective:  
 
Visit Vitals /52 (BP 1 Location: Right arm, BP Patient Position: At rest) Pulse 86 Temp 98.3 °F (36.8 °C) Resp 25 Ht 5' 10\" (1.778 m) Wt 106.6 kg (235 lb) SpO2 97% BMI 33.72 kg/m² Temp (24hrs), Av.2 °F (37.9 °C), Min:97.4 °F (36.3 °C), Max:102.9 °F (39.4 °C) No changes from my previous exam 6/3 Lines:  brown Physical Exam: Exam: General: NC/AT, alert and cooperative, looks stated age, in NAD HEENT: eyes closed Neck: supple, symmetric Cardiac: S1, s2 noted. 2/6 LLSB murmur Pulmonary: on 6L O2, diminished Abdomen: soft,non-distended + BS Skin: No rashes MSK:no enlarged or swollen joints in limbs Extremities: R foot see below, mild drainage from GT amput site. L GT with dry gangrene--no cellulitis Psychiatric: mood and affect appropriate to situation Data Review: CBC:  
Recent Labs  
  06/03/20 
0516 06/02/20 
0600 WBC 9.1 9.7  
RBC 3.56* 3.42* HGB 9.8* 9.3* HCT 31.9* 30.6*  264 GRANS 74  --   
LYMPH 17  --   
EOS 3  --   
 
CMP:  
Recent Labs  
  06/03/20 
0516 06/02/20 
0600 GLU 84 108*  138  
K 3.9 4.0  
 100 CO2 34* 33* BUN 35* 35* CREA 1.51* 1.42  
CA 8.7 7.7* AGAP 5* 5* Liver Enzymes: No results for input(s): TP, ALB, TBIL, AP in the last 72 hours. No lab exists for component: SGOT, GPT, DBIL Microbiology:  
 
All Micro Results Procedure Component Value Units Date/Time CULTURE, BLOOD [[id number]] Collected:  06/03/20 1716 Order Status:  Completed Specimen:  Blood Updated:  06/03/20 1853 CULTURE, BLOOD [[id number]] Collected:  06/03/20 1717 Order Status:  Completed Specimen:  Blood Updated:  06/03/20 1853 CULTURE, BLOOD [[id number]]  (Abnormal)  (Susceptibility) Collected:  05/25/20 1543 Order Status:  Completed Specimen:  Blood Updated:  06/03/20 1440 Special Requests: --     
  RIGHT 
HAND 
  
  GRAM STAIN    
  GRAM POS COCCI IN CLUSTERS  
     
   AEROBIC BOTTLE POSITIVE RESULTS VERIFIED, PHONED TO AND READ BACK BY SHYAM Carvalho 15. AT [facility] ON 5.28.20   Culture result:    
  * METHICILLIN RESISTANT STAPHYLOCOCCUS AUREUS *  
     
      
  * METHICILLIN RESISTANT STAPHYLOCOCCUS AUREUS * (2ND COLONY TYPE/STRAIN) PATIENT IS A KNOWN MRSA CULTURE, BLOOD [[id number]] Collected:  05/28/20 1500 Order Status:  Completed Specimen:  Blood Updated:  06/02/20 1143 Special Requests: --     
  LEFT 
HAND Culture result: NO GROWTH 5 DAYS     
 CULTURE, BLOOD [[id number]] Collected:  05/28/20 1453 Order Status:  Completed Specimen:  Blood Updated:  06/02/20 1143 Special Requests: --     
  RIGHT 
HAND Culture result: NO GROWTH 5 DAYS     
 EMERGENT DISEASE PANEL [[id number]] Collected:  05/31/20 1758 Order Status:  Completed Specimen:  Not Specified Updated:  06/02/20 9879 Emergent disease panel Not detected Comment: Performed at [facility] RESULTS SCANNED IN [facility], [place] STAIN [[id number]]  (Abnormal)  (Susceptibility) Collected:  05/25/20 0200 Order Status:  Completed Specimen:  Wound from Foot Updated:  05/29/20 4025 Special Requests: NO SPECIAL REQUESTS     
  GRAM STAIN NO WBCS SEEN     
   NO EPITHELIAL CELLS SEEN     
      
  MODERATE GRAM POSITIVE COCCI MODERATE GRAM NEGATIVE RODS Culture result:    
  HEAVY ESCHERICHIA COLI ** (EXTENDED SPECTRUM BETA LACTAMASE ) ** MODERATE ENTEROCOCCUS FAECALIS GROUP D MODERATE * METHICILLIN RESISTANT STAPHYLOCOCCUS AUREUS *  
     
      
  ESBL CALLED TO AND CORRECTLY REPEATED BY: 
Chilango Hernandez RN @ 1111 ON 5/28/20 AK. 
  
      
  ** MULTI-DRUG RESISTANT ORGANISM ** PATIENT IS A KNOWN MRSA SCANT MIXED SKIN BRENNAN ISOLATED  
     
 CULTURE, URINE [[id number]]  (Abnormal) Collected:  05/24/20 1900 Order Status:  Completed Specimen:  Cath Urine Updated:  05/28/20 0741 Special Requests: NO SPECIAL REQUESTS Culture result:    
  6000 COLONIES/mL SUKUMAR ALBICANS  
     
 CULTURE, BLOOD [[id number]]  (Abnormal) Collected:  05/25/20 1543 Order Status:  Completed Specimen:  Blood Updated:  05/28/20 8152 Special Requests: --     
  RIGHT Antecubital 
  
  GRAM STAIN GRAM POSITIVE COCCI AEROBIC BOTTLE POSITIVE RESULTS VERIFIED, PHONED TO AND READ BACK BY OLYA PERALTA @ 1730 5/27/20 MM Culture result: STAPHYLOCOCCUS AUREUS For Susceptibility Refer to Culture Accession [id number] CULTURE, BLOOD [[id number]]  (Abnormal) Collected:  05/24/20 1604 Order Status:  Completed Specimen:  Blood Updated:  05/28/20 2715 Special Requests: RIGHT ANTECUBITAL     
  GRAM STAIN    
  GRAM POS COCCI IN CLUSTERS  
     
      
  AEROBIC AND ANAEROBIC BOTTLES  
     
      
  CRITICAL RESULT NOT CALLED DUE TO PREVIOUS NOTIFICATION OF CRITICAL RESULT WITHIN THE LAST 24 HOURS. Culture result: STAPHYLOCOCCUS AUREUS For Susceptibility Refer to Culture Accession [id number] CULTURE, BLOOD [[id number]]  (Abnormal)  (Susceptibility) Collected:  05/24/20 1604 Order Status:  Completed Specimen:  Blood Updated:  05/28/20 8905 Special Requests: LEFT ANTECUBITAL     
  GRAM STAIN GRAM POSITIVE COCCI AEROBIC AND ANAEROBIC BOTTLES RESULTS VERIFIED, PHONED TO AND READ BACK BY Brant Briones @ 0503 5/25/20 MM Culture result:    
  * METHICILLIN RESISTANT STAPHYLOCOCCUS AUREUS * REFER TO BIOFIRE  PANEL ACC [id number] RESULTS VERIFIED, PHONED TO AND READ BACK BY 
Jacobo Abdullahi RN ON 5/28/20 @0732, TA 
  
 BLOOD CULTURE ID PANEL [[id number]]  (Abnormal) Collected:  05/24/20 1604 Order Status:  Completed Specimen:  Blood Updated:  05/25/20 0546 Acc. no. from MyDoc [id number] Staphylococcus Detected Staphylococcus aureus Detected Comment: RESULTS VERIFIED, PHONED TO AND READ BACK BY 
Gilberto Tena RN @ 6761 ON 5/25/2020 AK. mecA (Methicillin-Resistance Genes) Detected Comment: Presence of mecA is highly indicative of methicillin resistance.    The test does not replace traditional culture and susceptibilities RESULTS VERIFIED, PHONED TO AND READ BACK BY 
Yosi Mccall RN @ 0526 ON 05/25/2020 AK. INTERPRETATION Gram positive cocci in clusters, identified by realtime PCR as SUSPECTED MRSA. Comment: Recommend IV vancomycin use, unlikely to be a contaminant. Infectious Diseases Consult recommended in adult patients. THIS TEST DOES NOT REPLACE SENSITIVITY TESTING. Imaging: No new Signed By: Jo-Ann Barrios NP   
 June 4, 2020

## 2020-06-04 NOTE — PROGRESS NOTES
06/03/20 2033 Vital Signs Temp 100.2 °F (37.9 °C) Temp Source Oral  
Pulse (Heart Rate) (!) 108 Heart Rate Source Monitor Resp Rate 24  
O2 Sat (%) 93 % BP (!) 87/48 MAP (Calculated) (!) 61 BP 1 Method Automatic  
BP Patient Position At rest  
Notified MD

## 2020-06-04 NOTE — PROGRESS NOTES
Nutrition Assessment for:  
Follow-up 
  
Assessment: Admitted with severe sepsis with shock secondary to gangrene, a/c resp fx, DEJUAN/CKD. Past medical history significant for DVT, HIT, A. fib, morbid obesity, osteomyelitis, CKD, hemodialysis, hypertension, sleep apnea and type 2 diabetes. S/p debridement R foot, thoracentesis today. TERRI 6/2 showed aortic valve vegetation. Per RN patient has limited treatment options and Palliative care now following for goals. She states that patient had increased shortness of breath and fevers yesterday and did not eat well for evening meal. She states that other than that, he has been consuming ~50%. [facility] He states that he is feeling a little better today. He states that his appetite has improved some. He voiced dislike for coffee and is open to tea with breakfast instead. He states that he continues to drink Nepro and enjoys them. DIET DIABETIC CONSISTENT CARB Regular Intake per RN of ~50% meets ~50% EEN and ~50% EPN 
  
Anthropometrics: 
Height: 5' 10\" (177.8 cm), Weight: 111.6 kg (246 lb), Weight Source: Bed, Body mass index is 35.3 kg/m². BMI class of obese. BMI limited validation secondary to fluid status.  
  
Macronutrient needs: (using IBW (Ideal body weight) 73 kg) EER: 6299-1124 kcal/day (25-30 kcal/kg) EPR: 88-95 g/day (1.2-1.3 g/kg)  
  
Nutrition Intervention: 
Meals and snacks: Continue current diet. Discussed with PDA regarding tea with breakfast instead of coffee. Medical food supplement therapy: Increase Nepro to TID. Coordination of nutrition care by a Nutrition Professional: Discussed with Ventura Barrios RN. Discharge Nutrition Plan: Too soon to determine. 
  
[address], [address], [address]

## 2020-06-04 NOTE — PROGRESS NOTES
Pt has been febrile this shift. Tylenol administered per MAR. Hydration therapy administered per protocol for GFR 48. R foot dressing changed this shift. O2 at 15 liters nasal canula to maintain O2 sats in the 90s. Pt on bipap at night. Hourly and PRN rounds performed during this shift; all needs met at this time. Bed in low/locked position and call light, personal items within reach.

## 2020-06-04 NOTE — WOUND CARE
Saline moist packing to great toe amputation site completed per new order, as discussed with Charley Solomon yesterday. Wound still has area of necrotic slough lateral edge. Base of wound pink. Patient updated. Will follow and assist as needed.

## 2020-06-04 NOTE — PROGRESS NOTES
Interdisciplinary Rounds completed. Nursing, Case Management, and Physician  present. Plan of care reviewed and updated. Home vs Rehab at discharge with IV antibiotics. PT/OT/PPD ordered.

## 2020-06-04 NOTE — PROGRESS NOTES
CM met with patient's son Bacilio Rutledge in family meeting room this day, to discuss discharge plan. Son anticipates the patient to discharge with hospice services when medically stable. Bacilio Rutledge states the patient does not want to complete STR at this time. CM was receptive. Patient's son would like additional information regarding hospice services. Per chart review, hospice has been consulted this day. CM informed patient's son Bacilio Rutledge, hospice liaison is able to provide information regarding hospice services. Bacilio Rutledge was receptive to this information. CM provided contact information if the patient's son has any additional questions. CM continues to monitor.

## 2020-06-04 NOTE — PROGRESS NOTES
Call from [facility] for request  updated clinicals  On IV ABX and Wound care Faxed to [facility]  [phone] Please consult  if any new issues arise. CM will continue to follow

## 2020-06-04 NOTE — PROGRESS NOTES
Natasha Rutledge Admission Date: 5/24/2020 Daily Progress Note: 6/4/2020 Here with sepsis/ osteomyelitis and bilateral gangrenous toe. Septic shock improved, IV abx given and transferred to the floor. Vascular surgery consulted and debridement on 5/27. CXR consistent with acute pulmonary edema/CHF.  Has preserved LVEF on recent ECHO, hx of pA.fib. Given IV Lasix. Was admitted to ICU on BIPAP, DNR confirmed with severe sepsis, DEJUAN , presumed CHF and concern for osteomyelitis source with bilateral malodorous feet. Continued on Vancomycin, consulted intensivist, ID, cardiology, vascular, wound care. Was recently discharged by Dr. Savannah Nunez, vascular surgery service May 15 following right fourth left second and third toe amputations secondary to gangrene. Was discharged home on Midrin and Florinef. He is morbidly obesity with chronic 2-3 L home O2, bilateral carotid stenosis with CAD s/p CABG with AVR 1/10/20 by Dr. Mar Ross complicated by CKD requiring HD, HIT +, DVTs, wound left thigh and pneumonia, PVD, DM with diabetic nephropathy. Chronic anticoagulation with warfarin for paroxysmal A. Fib. Subjective:  
 
Febrile today with temp max 101.7. Yesterday had increased hypoxia, chest CT was negative for PE and given Lasix 60mg IV. Was placed on 15L HFNC. Now weaned to NC 4L and denies shortness of breath at rest.  
Has been wearing BiPAP at night. TERRI with mass on pacer lead and aorti side of prosthesis. May need pacer removed. Pt considering his options for his endocarditis/PM infection, none of which seem good. Review of Systems Cardiovascular: positive for ESTRELLA Current Facility-Administered Medications Medication Dose Route Frequency  warfarin (COUMADIN) tablet 4 mg  4 mg Oral QPM  
 vancomycin (VANCOCIN) 750 mg in  mL infusion  750 mg IntraVENous Q24H  
 furosemide (LASIX) tablet 40 mg  40 mg Oral DAILY  0.9% sodium chloride infusion  100 mL/hr IntraVENous PRN  
 fentaNYL citrate (PF) injection 25-50 mcg  25-50 mcg IntraVENous Multiple  midazolam (VERSED) injection 0.5-2 mg  0.5-2 mg IntraVENous Multiple  lidocaine (XYLOCAINE) 2 % viscous solution 15 mL  15 mL Mouth/Throat PRN  
 midazolam (VERSED) injection 0.5-2 mg  0.5-2 mg IntraVENous Multiple  fentaNYL citrate (PF) injection 25-50 mcg  25-50 mcg IntraVENous Multiple  polyethylene glycol (MIRALAX) packet 17 g  17 g Oral DAILY  bisacodyL (DULCOLAX) tablet 5 mg  5 mg Oral DAILY PRN  
 ondansetron (ZOFRAN) injection 4 mg  4 mg IntraVENous Q6H PRN  
 0.9% sodium chloride infusion 250 mL  250 mL IntraVENous PRN  
 lidocaine (XYLOCAINE) 10 mg/mL (1 %) injection 0.1 mL  0.1 mL SubCUTAneous PRN  
 sodium chloride (NS) flush 5-40 mL  5-40 mL IntraVENous Q8H  
 sodium chloride (NS) flush 5-40 mL  5-40 mL IntraVENous PRN  
 0.9% sodium chloride infusion 250 mL  250 mL IntraVENous PRN  
 ALPRAZolam (XANAX) tablet 0.5 mg  0.5 mg Oral BID PRN  
 famotidine (PEPCID) tablet 20 mg  20 mg Oral DAILY  insulin glargine (LANTUS) injection 25 Units  25 Units SubCUTAneous QHS  NUTRITIONAL SUPPORT ELECTROLYTE PRN ORDERS   Does Not Apply PRN  
 meropenem (MERREM) 500 mg in 0.9% sodium chloride (MBP/ADV) 50 mL  0.5 g IntraVENous Q8H  
 sodium chloride (NS) flush 5-40 mL  5-40 mL IntraVENous Q8H  
 sodium chloride (NS) flush 5-40 mL  5-40 mL IntraVENous PRN  
 acetaminophen (TYLENOL) tablet 650 mg  650 mg Oral Q4H PRN  
 albuterol (PROVENTIL VENTOLIN) nebulizer solution 2.5 mg  2.5 mg Nebulization Q6H RT  
 insulin lispro (HUMALOG) injection   SubCUTAneous AC&HS  
 buPROPion (WELLBUTRIN) tablet 75 mg  75 mg Oral BID  midodrine (PROAMATINE) tablet 10 mg  10 mg Oral TID  OLANZapine (ZyPREXA) tablet 5 mg  5 mg Oral QHS  mirtazapine (REMERON) tablet 15 mg  15 mg Oral QHS  sertraline (ZOLOFT) tablet 100 mg  100 mg Oral DAILY  tamsulosin (FLOMAX) capsule 0.4 mg  0.4 mg Oral DAILY Objective:  
 
Vitals:  
 06/04/20 0335 06/04/20 0729 06/04/20 0734 06/04/20 1139 BP: 112/55  106/52 117/55 Pulse: 88  86 93 Resp: 29  25 22 Temp: (!) 101.2 °F (38.4 °C)  98.3 °F (36.8 °C) 98.6 °F (37 °C) SpO2: 100% 99% 97% 98% Weight: 235 lb (106.6 kg) Height:      
 
Intake and Output:  
06/02 1901 - 06/04 0700 In: 104.1 [I.V.:104.1] Out: 1957 [[facility]:[id number]] No intake/output data recorded. Intake/Output Summary (Last 24 hours) at 6/4/2020 1150 Last data filed at 6/3/2020 1458 Gross per 24 hour Intake  Output 350 ml Net -350 ml Physical Exam:          
Constitutional: the patient is obese, on NC on 4 L, O2 sat 95% HEENT: Sclera clear, pupils equal, oral mucosa moist 
Lungs:  fine crackles, no wheezing, states has productive cough at times with yellow sputum and slightly blood tinged--sputum not observed. Cardiovascular: IRR without M,G,R, PM 
Abd/GI: soft and non-tender; with positive bowel sounds. rounded Ext: warm without cyanosis. There is no lower leg edema. Musculoskeletal: moves all four extremities with equal strength, right toe wound Skin: no jaundice or rashes, toe on right foot with + wound Neuro: A & O X 3 Lines/Drains: PIV,  brown Nutrition: ADA LAB Recent Labs  
  06/04/20 
1032 06/03/20 
0516 06/02/20 
0600 WBC 19.1* 9.1 9.7 HGB 9.6* 9.8* 9.3* HCT 32.1* 31.9* 30.6*  245 264 Recent Labs  
  06/04/20 
1032 06/04/20 
0617 06/03/20 
0516 06/02/20 
0600   --  139 138  
K 4.5  --  3.9 4.0  
CL 98  --  100 100 CO2 34*  --  34* 33* *  --  84 108* BUN 51*  --  35* 35* CREA 2.17*  --  1.51* 1.42 INR  --  2.4 1.9 1.8 Chest CT 6/3/20:   
1. No evidence of pulmonary embolism. 2. Bilateral lung edema or pneumonia, worse in the left lower lobe, and a small left pleural effusion. 3. Cardiomegaly, coronary artery disease, prior CABG, prosthetic aortic valve and an indwelling pacemaker. CXR  
6/4/20:  Unchanged pulmonary edema and small left pleural effusion. 6/2/20 Bilateral Ultra Sound done of the chest, pictures taken, placed in chart. Not enough fluid to warrant thoracentesis per Dr. Evelyn Tolbert. Assessment:  
 
Hospital Problems  Date Reviewed: 6/4/2020 Codes Class Noted POA Pulmonary edema, noncardiac ICD-10-CM: J81.1 ICD-9-CM: 967  5/25/2020 Yes I&Os inaccurate but have weaned O2 Osteomyelitis ([address] ICD-10-CM: M86.9 ICD-9-CM: 730.20  5/24/2020 Yes DEJUAN (acute kidney injury) ([address] ICD-10-CM: N17.9 ICD-9-CM: 584.9  5/24/2020 Yes Creat up to 2.17--on Lasix and received additional dose yesterday PAD (peripheral artery disease) (HCC) (Chronic) ICD-10-CM: I73.9 ICD-9-CM: 443.9  5/14/2020 Yes Gangrene of toe of both feet ([address] ICD-10-CM: D32 
ICD-9-CM: 785.4  5/11/2020 Yes Per wound and vascular surgery Long term (current) use of anticoagulants (Chronic) ICD-10-CM: Z79.01 
ICD-9-CM: V58.61  4/27/2020 Yes On Coumadin Pacemaker (Chronic) ICD-10-CM: Z95.0 ICD-9-CM: V45.01  4/27/2020 Yes Overview Signed 5/6/2020  9:46 PM by Marlee Ray MD  
  Dual chamber Biotronik pacemaker placed (1/24/20): AV block post op AVR. 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 
ICD-9-CM: 427.31  1/13/2020 Yes Overview Signed 5/6/2020  9:46 PM by Marlee Ray MD  
  Post op CABG. Rate controlled CAD (coronary artery disease) (Chronic) ICD-10-CM: I25.10 ICD-9-CM: 414.00  1/10/2020 Yes Overview Signed 5/6/2020  1:19 PM by Cristela Tirado MD  
  CABG (1/16/20):  LIMA to LAD. SVG to OM. SVG to PDA. chronic Acute respiratory failure with hypoxemia [facility]) ICD-10-CM: J96.01 
ICD-9-CM: 518.81  1/10/2020 Unknown Was on HFNC 15L--weaned to 4L now S/P CABG x 3 (Chronic) ICD-10-CM: Z95.1 ICD-9-CM: V45.81  1/10/2020 Yes Chronic--no angina Status post aortic valve replacement (Chronic) ICD-10-CM: Z95.2 ICD-9-CM: V43.3  1/10/2020 Yes Overview Addendum 5/6/2020  9:43 PM by Cristela Tirado MD  
  1. AVR (1/16/20):  25 mm Intuity Jon Gutierres Valve. 2.  Echo (4/12/20):  EF 55-60%. Normal functioning AVR. MG 15. PG 26. Mild MR.  
  
  
 chronic Type 2 diabetes with nephropathy (HCC) (Chronic) ICD-10-CM: E11.21 
ICD-9-CM: 250.40, 583.81  8/26/2019 Yes Per primary--ranges [id number] Obesity, morbid ([facility] [address] (Chronic) ICD-10-CM: E66.01 
ICD-9-CM: 278.01  10/19/2018 Yes  
 chronic Hx of AVR, PPM and presented with sepsis bacteremia and necrotic toe. He is s/p debridement and has been seen by ID. ABX given. Diuresed for gross volume overload. Encouraged to wear BiPAP. TERRI ordered for bacteremia with valve. Weak, will need rehab after discharge. Has home BiPAP PLAN:  
 
--Antibiotic regimen per ID:  Merrrem, Vancomycin 
--Albuterol 
--Lasix 40 mg PO daily, urine output not accurately recorded, creat up to 2.17 
--Anticoagulation:  On Coumadin-INR 2.4 today 
--CXR and chest CT reviewed:  Has chronic obscurement of L hemidiaphragm and ultrasound without clear window for thoracentesis. --Continue BiPAP at night 
--Considering PM removal given vegetation there 
--WBC up to 19.1 today ( was 9.1 yesterday) --Wound therapy and vascular surgery following 
--Palliative Care following and having discussions with his son.  Family would like clarification on treatment options including potential procedures and abx. If unable to clear infection and required infusions son feels pt would opt for hospice and refuse further abx treatment. Will clarify all treatment options. Ivet Solis NP Lungs:  Mild bibasilar crackles Heart:  RRR with no Murmur/Rubs/Gallops Additional Comments:   
Patient weaned to 3L O2 and satting well. Pt's creatinine suddenly jumped today to 2.2. may not tolerate much more lasix. Tells me he wants to do abx at home. Cont these as per ID's input. Wean o2 as sats tolerate and cont nocturnal bipap. I have spoken with and examined the patient. I agree with the above assessment and plan as documented.  
 
Corina Hsu MD

## 2020-06-04 NOTE — PROGRESS NOTES
06/04/20 0335 Vital Signs Temp (!) 101.2 °F (38.4 °C) (nurse notified) Temp Source Oral  
 
Tylenol administered

## 2020-06-04 NOTE — PROGRESS NOTES
Patient placed on BiPAP for use QHS. No distress or discomfort currently noted. Will continue to monitor. 06/03/20 2325 Oxygen Therapy O2 Sat (%) 96 % Pulse via Oximetry 99 beats per minute O2 Device BIPAP  
FIO2 (%) 40 % CPAP/BIPAP  
CPAP/BIPAP Start/Stop On Device Mode BIPAP;S/T  
$$ Bipap Daily Yes Mask Type and Size Full face; Medium Skin Condition Intact PIP Observed 17 cm H20 IPAP (cm H2O) 16 cm H2O  
EPAP (cm H2O) 8 cm H2O Inspiratory Time (sec) 1 seconds Vt Spont (ml) 509 ml Ve Observed (l/min) 11.7 l/min Backup Rate 12 Total RR (Spontaneous) 23 breaths per minute Insp Rise Time (sec) 3 Leak (Estimated) 10 L/min  
Pt's Home Machine No  
Biomedical Check Performed Yes Settings Verified Yes Alarm Settings High Pressure 40 Low Pressure 2 Apnea 20 Low Ve 4 High Rate 50 Low Rate 5 Pulmonary Toilet Pulmonary Toilet H. O.B elevated

## 2020-06-05 NOTE — PROGRESS NOTES
Progress Note Patient: Tiana Kelsey MRN: [id number]  SSN: [id number] YOB: 1945  Age: 69 y.o. Sex: male Admit Date: 5/24/2020 LOS: 12 days Subjective: F/U MRSA bacteremia 68 yo hx of DVT, HIT, a fib, morbid obesity on chronic 2-3L NC at home, sleep apnea, osteomyelitis, bradycardia s/p pacer, CKD stage III baseline creatine 2.4, s/p aortic valve replacement with bioprosthetic valve and CABG January of 2020, paroxysmal a fib on warfarin, chronic gangrenous toes, recent decubitus ulcer that has healed,  PVD, pacemaker, depression, carotid stenosis who presented to the ED for cc near syncope. Patient found to be in severe sepsis, pulmonary edema, DEJUAN, along with malodorous foot wounds. Recently had removal of right 4th and left 2nd and 3rd digits on May 15th by Dr. Ligia Dodson. Blood cultures growing MRSA. Wound cultures growing e faecalis, e coli, and MRSA. Repeat blood cultures on 5/28 no growth. COVID negative. Pulmonology also following for his pulmonary edema but no indication for thoracentesis. ID following. TERRI performed on 6/2/20 and found vegetations to his aortic valve along with pacemaker. Pacemaker interrogated and has atrial paced 0% and V paced 11% so may be a candidate for extraction. High risk for aortic valve replacement. On 6/3/20 had acute episode of hypoxia. CT chest PE protocol showed no PE. Fluid seen on chest x ray and BiPAP placed along with lasix which improved SOB. Patient now on 3L NC from 15L. Patient and son states family is wanting hospice but trying to decide if wants home or at facility. INR 2.1. No SOB or chest pain. Wanting to speak with his wife in person before he makes final decision on antibiotics and where to go for hospice. Current Facility-Administered Medications Medication Dose Route Frequency  VANCOMYCIN TROUGH REMINDER   Other ONCE  warfarin (COUMADIN) tablet 4 mg  4 mg Oral QPM  
  atorvastatin (LIPITOR) tablet 40 mg  40 mg Oral QHS  vancomycin (VANCOCIN) 750 mg in  mL infusion  750 mg IntraVENous Q24H  
 [Held by provider] furosemide (LASIX) tablet 40 mg  40 mg Oral DAILY  fentaNYL citrate (PF) injection 25-50 mcg  25-50 mcg IntraVENous Multiple  lidocaine (XYLOCAINE) 2 % viscous solution 15 mL  15 mL Mouth/Throat PRN  polyethylene glycol (MIRALAX) packet 17 g  17 g Oral DAILY  bisacodyL (DULCOLAX) tablet 5 mg  5 mg Oral DAILY PRN  
 ondansetron (ZOFRAN) injection 4 mg  4 mg IntraVENous Q6H PRN  
 0.9% sodium chloride infusion 250 mL  250 mL IntraVENous PRN  
 lidocaine (XYLOCAINE) 10 mg/mL (1 %) injection 0.1 mL  0.1 mL SubCUTAneous PRN  
 sodium chloride (NS) flush 5-40 mL  5-40 mL IntraVENous Q8H  
 sodium chloride (NS) flush 5-40 mL  5-40 mL IntraVENous PRN  
 0.9% sodium chloride infusion 250 mL  250 mL IntraVENous PRN  
 ALPRAZolam (XANAX) tablet 0.5 mg  0.5 mg Oral BID PRN  
 famotidine (PEPCID) tablet 20 mg  20 mg Oral DAILY  insulin glargine (LANTUS) injection 25 Units  25 Units SubCUTAneous QHS  NUTRITIONAL SUPPORT ELECTROLYTE PRN ORDERS   Does Not Apply PRN  
 meropenem (MERREM) 500 mg in 0.9% sodium chloride (MBP/ADV) 50 mL  0.5 g IntraVENous Q8H  
 sodium chloride (NS) flush 5-40 mL  5-40 mL IntraVENous Q8H  
 sodium chloride (NS) flush 5-40 mL  5-40 mL IntraVENous PRN  
 acetaminophen (TYLENOL) tablet 650 mg  650 mg Oral Q4H PRN  
 albuterol (PROVENTIL VENTOLIN) nebulizer solution 2.5 mg  2.5 mg Nebulization Q6H RT  
 insulin lispro (HUMALOG) injection   SubCUTAneous AC&HS  
 buPROPion (WELLBUTRIN) tablet 75 mg  75 mg Oral BID  midodrine (PROAMATINE) tablet 10 mg  10 mg Oral TID  OLANZapine (ZyPREXA) tablet 5 mg  5 mg Oral QHS  mirtazapine (REMERON) tablet 15 mg  15 mg Oral QHS  sertraline (ZOLOFT) tablet 100 mg  100 mg Oral DAILY  tamsulosin (FLOMAX) capsule 0.4 mg  0.4 mg Oral DAILY Objective:  
 
Vitals: 06/05/20 0851 06/05/20 1215 06/05/20 1452 06/05/20 1500 BP: 118/55 124/62 119/64 Pulse: 88 88 91 Resp: 22 20 22 Temp: 98.4 °F (36.9 °C) 98.6 °F (37 °C) 98.1 °F (36.7 °C) SpO2: 96% 94% 94% 94% Weight:      
Height:      
  
  
Intake and Output: 
Current Shift: 06/05 0701 - 06/05 1900 In: -  
Out: 171 [FTDTB:184] Last three shifts: 06/03 1901 - 06/05 0700 In: -  
Out: 750 [Urine:750] Physical Exam:  
General:  Alert, cooperative, no distress. Telling me he is wanting hospice Eyes:  Conjunctivae/corneas clear. Ears:  Normal TMs and external ear canals both ears. Nose: Nares normal. Septum midline. Mouth/Throat: Lips, mucosa, and tongue normal.   
Neck:  no JVD. Back:   deferred Lungs:   CTA bilaterally with limited breathe sounds. Heart:  Regular rate and rhythm, S1, S2 normal  
Abdomen:   Soft, non-tender. Bowel sounds normal. Morbidly obese. Extremities: Wearing socks. Pulses: 2+ and symmetric all extremities. Skin: As above. Lymph nodes: Cervical, supraclavicular, and axillary nodes normal.  
Neurologic: CNII-XII intact. Lab/Data Review: 
 
Recent Results (from the past 24 hour(s)) GLUCOSE, POC Collection Time: 06/04/20  4:45 PM  
Result Value Ref Range Glucose (POC) 210 (H) 65 - 100 mg/dL GLUCOSE, POC Collection Time: 06/04/20  8:36 PM  
Result Value Ref Range Glucose (POC) 227 (H) 65 - 100 mg/dL PROTHROMBIN TIME + INR Collection Time: 06/05/20  6:21 AM  
Result Value Ref Range Prothrombin time 24.5 (H) 12.0 - 14.7 sec INR 2.1 METABOLIC PANEL, BASIC Collection Time: 06/05/20  6:21 AM  
Result Value Ref Range Sodium 136 136 - 145 mmol/L Potassium 4.0 3.5 - 5.1 mmol/L Chloride 99 98 - 107 mmol/L  
 CO2 31 21 - 32 mmol/L Anion gap 6 (L) 7 - 16 mmol/L Glucose 115 (H) 65 - 100 mg/dL BUN 50 (H) 8 - 23 MG/DL Creatinine 2.21 (H) 0.8 - 1.5 MG/DL  
 GFR est AA 38 (L) >60 ml/min/1.73m2 GFR est non-AA 31 (L) >60 ml/min/1.73m2 Calcium 8.5 8.3 - 10.4 MG/DL  
GLUCOSE, POC Collection Time: 06/05/20  8:45 AM  
Result Value Ref Range Glucose (POC) 133 (H) 65 - 100 mg/dL GLUCOSE, POC Collection Time: 06/05/20 12:12 PM  
Result Value Ref Range Glucose (POC) 144 (H) 65 - 100 mg/dL Assessment/ Plan: Active Problems: 
  Obesity, morbid ([address] (10/19/2018) Type 2 diabetes with nephropathy ([address] (8/26/2019) CAD (coronary artery disease) (1/10/2020) Overview: CABG (1/16/20):  LIMA to LAD. SVG to OM. SVG to PDA. Acute respiratory failure with hypoxemia ([address] (1/10/2020) S/P CABG x 3 (1/10/2020) Status post aortic valve replacement (1/10/2020) Overview: 1. AVR (1/16/20):  25 mm Intuity Jon Gutierres Valve. 2.  Echo (4/12/20):  EF 55-60%. Normal functioning AVR. MG 15. PG 26. Mild MR. Atrial fibrillation ([address] (1/13/2020) Overview: Post op CABG. HIT (heparin-induced thrombocytopenia) ([address] (1/23/2020) Hypotension (2/18/2020) DNR (do not resuscitate) (2/26/2020) Long term (current) use of anticoagulants (4/27/2020) Pacemaker (4/27/2020) Overview: Dual chamber Biotronik pacemaker placed (1/24/20): AV block post op AVR. Gangrene of toe of both feet ([address] (5/11/2020) PAD (peripheral artery disease) ([address] (5/14/2020) Osteomyelitis ([address] (5/24/2020) DEJUAN (acute kidney injury) ([address] (5/24/2020) Pulmonary edema, noncardiac (5/25/2020) MRSA bacteremia - From foot wounds versus possible old sacral wound. Patient does not want further amputations. TERRI showed vegetation to aortic valve and pacer. Patient states he does not want surgery for pacer or aortic valve replacement stating he thinks he will die anyway. Plan for Vancomycin for minimum EOT 7/9. Also on meropenem since enterococcus and e coli from wound cultures until 6/24.  Son and patient agree to hospice but unsure if want at facility or at home. Right foot infection - s/p debridement on 5/27. Wound care following. DM type II  - A1C 9.6. Non compliant. Lantus 25 units qHS, SS. Paroxysmal a fib - Warfarin. Daily INR. Therapeutic today. Depression - Wellbutrin. Olanzapine. Zoloft. Midodrine ECHO showed EF 55%, RV pressure 25-30mmHg, Once he speaks with his wife in person today he states he will make a decision on where he would want to go for hospice. I stated if he stopped antibiotics, he would not have long at all. DVT prophylaxis - Warfarin. Signed By: Lisseth Pollock DO   
 June 5, 2020

## 2020-06-05 NOTE — PROGRESS NOTES
patient going for hospice now. Spoke with Dr. Mil Mayo and we will sign off. Told to call back if needed.  
 
Fabio Morrissey MD

## 2020-06-05 NOTE — HOSPICE
Spoke with the patient's son, Javier Nava, on the phone. Discussed the criteria for Hospice Care and the Hospice Philosophy. Discussed the two types of Hospice Care;Routine Hospice Care ([address]) and General In Patient (GIP) care. Discussed the criteria for GIP care at the [facility]). Discussed that his father was not requiring any medication for pain management nor other medications that required GIP care. Discussed the services provided with [facility]. Discussed that if Harriet Tao was home with [facility] from [address] [place] [address]) and he got to a point were the Hospice Physician determined that GIP care was required to keep him comfortable we would request that he be moved to the [facility]. Lynne Castellano said that he would like his father to come home with [facility] per his mother's wishes but he was taking his mother to [facility] today to discuss it with his father. Lynne Castellano also said that he was told his father would not be discharged from [facility] until next week due to the IV antibiotic treatments. Reviewed the patient's Electronic Medical Record (EMR) and treatments with Dr. [facility] the patient meets criteria for [facility]. Star Pulling know that a [facility] Liaison would call him tomorrow to let him know of the Hospice Physician's determination and to discuss the next step in getting Harriet Tao admitted to [facility] for [address] We will continue to follow and I will call the  Bessy Paredes to inform her of the Hospice Physician's determination. Thank you for this referral, 
 
Monica Riojas RN, BSN Community Nurse Liaison [facility] [phone]

## 2020-06-05 NOTE — PROGRESS NOTES
Warfarin dosing per pharmacist 
 
Esdras Parker is a 69 y.o. male. Height: 5' 10\" (177.8 cm)    Weight: 111.9 kg (246 lb 12.8 oz) Indication: Bioprosthetic aortic valve (25 mm Gutierres-Jon Intuity valve placed 1/10/2020), AFib after CABG and AVR in January 2020, HIT + during admission January 2020, Hx of DVT during admission January 2020 Goal INR:  2.5 to 3.5 [facility] from [facility]6/2020, but goal 2 to 3 per Dr. Burke Garcia visit on 5/19/2020 Per discussion with Dr. Diana Dangelo (Cardiology) on 6/3/2020, will target goal 2 to 3 for now. Home dose:  5 mg Sun, Tues, Thurs, 7.5 mg all other days of the week (45 mg total weekly dose) Risk factors or significant drug interactions:  None Other anticoagulants:  N/A *History of HIT Daily Monitoring Date  INR     Warfarin dose HGB              Notes 5/25  7.4  Hold  8.5 2.5 mg PO Vit K admin 5/26  >9.9  Hold  7.0 10 mg PO Vit K admin 5/27  2.4  Hold  6.8        1 unit PRBC transfused 5/28  2  Hold  7.6  1 unit PRBC transfused 5/29  1.8  5 mg  9.5 5/30  1.2  5 mg   10.2 
5/31  1.3  5 mg   9.9 6/1  1.7  5 mg  -- 
6/2  1.8  7.5 mg  9.3 6/3  1.9  5 mg  9.8 
6/4  2.4  4 mg  9.6 
6/5   2.1  4 mg  --- Patient with labile INRs since admission, increased to > 9.9 on 5/26. INR down slightly to 2.1. Will continue with 4 mg every evening for now. Pharmacy will continue to follow patient and monitor INR daily. Thank you, Palmira Kat Clinical Pharmacist 
[phone]

## 2020-06-05 NOTE — PROGRESS NOTES
Pt spiked a fever of 101. 4.medicated per mar. Right foot dressing changed. Bipap at night Hourly round completed throughout the shift. No complain of pain and denies any needs at this time Bed in lower position and call light/ personal items within reach. Will continue to monitor and give bed side shift report to on coming day shift nurse.

## 2020-06-05 NOTE — PROGRESS NOTES
Interdisciplinary Rounds completed. Nursing, Case Management, and Physician  present. Plan of care reviewed and updated. Palliative care discussing goals of care. Hospice House vs home with hospice.

## 2020-06-05 NOTE — PROGRESS NOTES
[facility] CARDIOLOGY PROGRESS NOTE 
      
 
6/5/2020 3:36 PM 
 
Admit Date: 5/24/2020 Subjective:  
 
 Persistent febrile episodes; (101.4F this am); no CP. On 3L NC 
 
ROS: 
Cardiovascular:  As noted above Objective:  
  
Vitals:  
 06/05/20 0851 06/05/20 1215 06/05/20 1452 06/05/20 1500 BP: 118/55 124/62 119/64 Pulse: 88 88 91 Resp: 22 20 22 Temp: 98.4 °F (36.9 °C) 98.6 °F (37 °C) 98.1 °F (36.7 °C) SpO2: 96% 94% 94% 94% Weight:      
Height:      
 
 
Physical Exam: 
General-No Acute Distress Neck- supple, no JVD 
CV- regular rate and rhythm, + systolic ejection murmur Lung- clear bilaterally no wheezes Abd- soft, nontender, nondistended Ext- trace leg edema bilaterally. Skin- warm and dry Data Review:  
Recent Labs  
  06/05/20 
8661 06/04/20 
1032 06/04/20 
0617 06/03/20 
0238  136  --  139  
K 4.0 4.5  --  3.9 BUN 50* 51*  --  35* CREA 2.21* 2.17*  --  1.51* * 193*  --  84 WBC  --  19.1*  --  9.1 HGB  --  9.6*  --  9.8* HCT  --  32.1*  --  31.9*  
PLT  --  224  --  245 INR 2.1  --  2.4 1.9 Assessment/Plan: Active Problems: 
  Obesity, morbid ([facility] (10/19/2018) Type 2 diabetes with nephropathy [facility] (8/26/2019) - per primary CAD (coronary artery disease) (1/10/2020) S/P CABG x 3 (1/10/2020) -reinitiated high intensity statin therapy; stable Bacteremia - MRSA from  5/25/20;  cultures from 5/28/20 and 6/3/20 with NGTD; abx per ID 
  - ABX per ID Status post aortic valve replacement (1/10/2020) - valve with mass on aortic side of prosthesis , location atypical for infective endocarditis; consideration for thrombus/infected thrombus with bacteremia. Ideally if endocarditis with MRSA involving prosthesis, consideration for surgery but not candidate.   Likely needs long term abx.   
-would plan repeating blood cultures after EOT; if recurrent +, would confirm AVIE   
 
 Atrial fibrillation ([address] (1/13/2020) On coumadin; hx of HIT; INR 2.4  
 
  HIT (heparin-induced thrombocytopenia) ([address] (1/23/2020) Pacemaker (4/27/2020) - mass on pacer lead on TERRI 6/2/2020; ?vegetation. Consideration for device extraction with no significant pacing on interrogation but given AV mass, unlikely to change outcome. - placed for severe symptomatic bradycardia on 1/24/2020 Gangrene of toe of both feet ([address] (5/11/2020) - per vascular surgery Edema 
-stable; component of hypoalbuminemia (1.8)/venous insuff; held lasix (6/4/20) with worsening renal function Appears likely plans for hospice. Extensive discussion with patient Charlotte Lovell MD 
6/5/2020 3:36 PM

## 2020-06-05 NOTE — PROGRESS NOTES
Right foot wound stable. Left second third toe amputation sites stable as well. Plan continue dressing changes to open right foot wound. Per the notes the patient has decided on hospice care. We will be available if needed.

## 2020-06-05 NOTE — PROGRESS NOTES
Infectious Disease Progress Note Today's Date: 6/5/2020 Admit Date: 5/24/2020 Impression: · MRSA bacteremia with AVIE and vegetation on PPM lead  (5/24, 5/25): source would appear to be R foot that ultimately contaminated PPM 
· Persistent Fevers and Dyspnea despite abx-may need Bipap · R foot infection s/p earlier amputations for gangrene. Site cx 5/25  ESBL E Coli, E Faecalis, MRSA. S/p debridement (5/27) · PVD with recent amputations gangrenous toes on both right and left foots. X-ray with bony changes on R but given recent surgery unclear this represents OM. If has amputation/BKA then will not be an issue for treatment duration. · He reports PCN allergy as feeling strange after IM PCN injection when he was in [facility] and Keflex and rash. Overall doubt these are major allergies. Plan:  
· Hospice consulted. If aggressive care decided although we have no change to cure infection due to PPM and prosthetic valve involvement--Continue Vancomycin--Duration 6-8 weeks minimum (EOT 7/9). Unfortunately MRSA specimen in resistant to ALL oral abx except Zyvox (which is not a long term abx option). Continue Meropenem (change to ertapenem for outpt ease) for 4 weeks( 6/24)  to treat ESBL E coli. · Palliative discussing goals with patient and his son. · Dispo: ?hospice Anti-infectives:  
Vancomycin 5/24- Clindamycin 5/24-5/28 Levaquin-->Edd Subjective:  
Spiking fevers regularly since 6/3. Sitting up in chair after he worked with PT. Very weak, dyspnea noted. Again educated about ID plans Review of Systems:  A comprehensive review of systems was negative except for that written in the History of Present Illness. Objective:  
 
Visit Vitals /55 (BP 1 Location: Right arm, BP Patient Position: At rest) Pulse 78 Temp 98.8 °F (37.1 °C) Resp 25 Ht 5' 10\" (1.778 m) Wt 111.9 kg (246 lb 12.8 oz) SpO2 100% BMI 35.41 kg/m² Temp (24hrs), Av.9 °F (37.7 °C), Min:98.6 °F (37 °C), Max:101.4 °F (38.6 °C) No changes from my previous exam  Lines:  kevin Physical Exam: Exam:   
 General: NC/AT, alert and cooperative, looks stated age, in NAD HEENT: eyes closed Neck: supple, symmetric Cardiac: S1, s2 noted.  LLSB murmur Pulmonary: on 6L O2, diminished Abdomen: soft,non-distended + BS Skin: No rashes MSK:no enlarged or swollen joints in limbs Extremities: R foot see below, mild drainage from GT amput site. L GT with dry gangrene--no cellulitis Psychiatric: mood and affect appropriate to situation Data Review: CBC:  
Recent Labs  
  20 
1032 20 
0516 WBC 19.1* 9.1  
RBC 3.49* 3.56* HGB 9.6* 9.8* HCT 32.1* 31.9*  
 245 GRANS 88* 74 LYMPH 8* 17 EOS 0* 3 CMP:  
Recent Labs  
  20 
0621 20 
1032 20 
0516 * 193* 84  136 139  
K 4.0 4.5 3.9 CL 99 98 100 CO2 31 34* 34* BUN 50* 51* 35* CREA 2.21* 2.17* 1.51* CA 8.5 7.9* 8.7 AGAP 6* 4* 5* Liver Enzymes: No results for input(s): TP, ALB, TBIL, AP in the last 72 hours. No lab exists for component: SGOT, GPT, DBIL Microbiology:  
 
All Micro Results Procedure Component Value Units Date/Time CULTURE, BLOOD [[id number]] Collected:  20 Order Status:  Completed Specimen:  Blood Updated:  20 6480 Special Requests: --     
  RIGHT Antecubital 
ARM Culture result: NO GROWTH AFTER 13 HOURS     
 CULTURE, BLOOD [[id number]] Collected:  20 Order Status:  Completed Specimen:  Blood Updated:  20 2501 Special Requests: --     
  LEFT 
HAND Culture result: NO GROWTH AFTER 13 HOURS     
 CULTURE, BLOOD [[id number]]  (Abnormal)  (Susceptibility) Collected:  20 1543 Order Status:  Completed Specimen:  Blood Updated:  20 1440   Special Requests: --     
  RIGHT 
HAND 
  
  GRAM STAIN    
 GRAM POS COCCI IN CLUSTERS  
     
   AEROBIC BOTTLE POSITIVE RESULTS VERIFIED, PHONED TO AND READ BACK BY SHYAM Carvalho 15. AT [address] [place] [address] ON 5.28.20   Culture result:    
  * METHICILLIN RESISTANT STAPHYLOCOCCUS AUREUS *  
     
      
  * METHICILLIN RESISTANT STAPHYLOCOCCUS AUREUS * (2ND COLONY TYPE/STRAIN) PATIENT IS A KNOWN MRSA CULTURE, BLOOD [[id number]] Collected:  05/28/20 1500 Order Status:  Completed Specimen:  Blood Updated:  06/02/20 1143 Special Requests: --     
  LEFT 
HAND Culture result: NO GROWTH 5 DAYS     
 CULTURE, BLOOD [[id number]] Collected:  05/28/20 1453 Order Status:  Completed Specimen:  Blood Updated:  06/02/20 1143 Special Requests: --     
  RIGHT 
HAND Culture result: NO GROWTH 5 DAYS     
 EMERGENT DISEASE PANEL [[id number]] Collected:  05/31/20 1758 Order Status:  Completed Specimen:  Not Specified Updated:  06/02/20 3138 Emergent disease panel Not detected Comment: Performed at [facility] RESULTS SCANNED IN [facility], Nayeli Astudillo STAIN [[id number]]  (Abnormal)  (Susceptibility) Collected:  05/25/20 0200 Order Status:  Completed Specimen:  Wound from Foot Updated:  05/29/20 2026 Special Requests: NO SPECIAL REQUESTS     
  GRAM STAIN NO WBCS SEEN     
   NO EPITHELIAL CELLS SEEN     
      
  MODERATE GRAM POSITIVE COCCI MODERATE GRAM NEGATIVE RODS Culture result:    
  HEAVY ESCHERICHIA COLI ** (EXTENDED SPECTRUM BETA LACTAMASE ) ** MODERATE ENTEROCOCCUS FAECALIS GROUP D MODERATE * METHICILLIN RESISTANT STAPHYLOCOCCUS AUREUS *  
     
      
  ESBL CALLED TO AND CORRECTLY REPEATED BY: 
Ty Alegria RN @ 6276 ON 5/28/20 AK. 
  
      
  ** MULTI-DRUG RESISTANT ORGANISM ** PATIENT IS A KNOWN MRSA SCANT MIXED SKIN BRENNAN ISOLATED CULTURE, URINE [[id number]]  (Abnormal) Collected:  05/24/20 1900 Order Status:  Completed Specimen:  Cath Urine Updated:  05/28/20 0741 Special Requests: NO SPECIAL REQUESTS Culture result:    
  6000 COLONIES/mL SUKUMAR ALBICANS  
     
 CULTURE, BLOOD [[id number]]  (Abnormal) Collected:  05/25/20 1543 Order Status:  Completed Specimen:  Blood Updated:  05/28/20 3365 Special Requests: --     
  RIGHT Antecubital 
  
  GRAM STAIN GRAM POSITIVE COCCI AEROBIC BOTTLE POSITIVE RESULTS VERIFIED, PHONED TO AND READ BACK BY OLYA PERALTA @ 5392 5/27/20 MM Culture result: STAPHYLOCOCCUS AUREUS For Susceptibility Refer to Culture Accession [id number] CULTURE, BLOOD [[id number]]  (Abnormal) Collected:  05/24/20 1604 Order Status:  Completed Specimen:  Blood Updated:  05/28/20 9395 Special Requests: RIGHT ANTECUBITAL     
  GRAM STAIN    
  GRAM POS COCCI IN CLUSTERS  
     
      
  AEROBIC AND ANAEROBIC BOTTLES  
     
      
  CRITICAL RESULT NOT CALLED DUE TO PREVIOUS NOTIFICATION OF CRITICAL RESULT WITHIN THE LAST 24 HOURS. Culture result: STAPHYLOCOCCUS AUREUS For Susceptibility Refer to Culture Accession [id number] CULTURE, BLOOD [[id number]]  (Abnormal)  (Susceptibility) Collected:  05/24/20 1604 Order Status:  Completed Specimen:  Blood Updated:  05/28/20 6015 Special Requests: LEFT ANTECUBITAL     
  GRAM STAIN GRAM POSITIVE COCCI AEROBIC AND ANAEROBIC BOTTLES RESULTS VERIFIED, PHONED TO AND READ BACK BY Margaret Ojeda @ 0503 5/25/20 MM Culture result:    
  * METHICILLIN RESISTANT STAPHYLOCOCCUS AUREUS * REFER TO BIOFIRE  PANEL ACC [id number] RESULTS VERIFIED, PHONED TO AND READ BACK BY 
eKlsey Menchaca RN ON 5/28/20 @0732, TA 
  
 BLOOD CULTURE ID PANEL [[id number]]  (Abnormal) Collected:  05/24/20 1604 Order Status:  Completed Specimen:  Blood Updated:  05/25/20 4017 Acc. no. from [facility] [employer] [id number] Staphylococcus Detected Staphylococcus aureus Detected Comment: RESULTS VERIFIED, PHONED TO AND READ BACK BY 
Sara Muse RN @ 6717 ON 5/25/2020 AK. mecA (Methicillin-Resistance Genes) Detected Comment: Presence of mecA is highly indicative of methicillin resistance. The test does not replace traditional culture and susceptibilities RESULTS VERIFIED, PHONED TO AND READ BACK BY 
Sara Muse RN @ 6927 ON 05/25/2020 AK. INTERPRETATION Gram positive cocci in clusters, identified by realtime PCR as SUSPECTED MRSA. Comment: Recommend IV vancomycin use, unlikely to be a contaminant. Infectious Diseases Consult recommended in adult patients. THIS TEST DOES NOT REPLACE SENSITIVITY TESTING. Imaging: No new Signed By: Lj Dugan NP   
 June 5, 2020

## 2020-06-05 NOTE — PROGRESS NOTES
Problem: Mobility Impaired (Adult and Pediatric) Goal: *Acute Goals and Plan of Care (Insert Text) Description: LTG: 
(1.)Mr. Vania Beltrán will move from supine to sit and sit to supine , scoot up and down and roll side to side in bed with MODIFIED INDEPENDENCE within 7 treatment day(s). (2.)Mr. Vania Beltrán will transfer from bed to chair and chair to bed with MODIFIED INDEPENDENCE using the least restrictive device maintaining heel WBing R LE within 7 treatment day(s). (3.)Mr. Vania Beltrán will ambulate with MODIFIED INDEPENDENCE for 50+ feet maintaining heel WBing R LE with the least restrictive device within 7 treatment day(s). ________________________________________________________________________________________________ Outcome: Progressing Towards Goal 
  
PHYSICAL THERAPY: Daily Note and AM 6/5/2020 INPATIENT: PT Visit Days : 3 Heel WBing R LE 
[place] / [place] / Product Type: Medicare /   
  
NAME/AGE/GENDER: Huy Alberts is a 69 y.o. male PRIMARY DIAGNOSIS: Severe sepsis ([address] [A41.9, R65.20] Lactic acidosis [E87.2] Acute CHF (congestive heart failure) ([address] [I50.9] Osteomyelitis ([address] [M86.9] Hypotension [I95.9] Severe sepsis with septic shock (HCC) Severe sepsis with septic shock ([address] Procedure(s) (LRB): ULTRASOUND (Bilateral) 4 Days Post-Op ICD-10: Treatment Diagnosis:  
 · Other abnormalities of gait and mobility (R26.89) Precaution/Allergies: 
Heparin; Amoxicillin; and Keflex [cephalexin] ASSESSMENT:  
 
Mr. Vania Beltrán recently (5/27/20) underwent debridement of R forefoot by vasculature surgery and needs to be heel WBing per chart. Patient reports that he ambulates with RW independently in home but does require assistance with sit to supine to lift his LE's onto the bed. Patient was supine upon contact and agreeable to PT. Patient performs supine to sit with SBA and cues for technique.  Patient transfers to standing with min assist and cues for hand placement. Once standing patient ambulates 30' with rolling walker, cues for heel weight bearing on RLE, and cues for sequencing/posture. Patient has difficulty maintaining heel weight bearing needing increased cues. Patient sits on EOB where he wants to urinate prior to sitting in recliner chair. Patient then transfers to standing and ambulates an additional 15' to recliner chair with same above assist and cues. Patient sits in recliner chair where he then participates in therapeutic strengthening exercises to improve functional strength for transfers, gait and overall mobility. Patient requires cues to perform exercises correctly. Overall slow progress towards physical therapy goals. Patient's goals listed above are still appropriate. Will continue skilled PT to address remaining deficits. 06/05- Supine in bed on contact. Pt not agreeable initially, after gentle conversation and encouragement to sit EOB, agreeable. Pt sat with good control for initial treatment. Unsure of foot placement and contact guard to move to sit. Once seated, min A to scoot and education of heel WB. Pt then feeling better and agreeable to attempt AMB. Stands with CGA and RW. Good Static balance, but occasionally needs support. Amb to chair with fair balance and sit with CGA. Pt admits he is disheartened with current situation. Works in therapy with good effort. Resting in chair at end of session, all needs met. RN notified. This section established at most recent assessment PROBLEM LIST (Impairments causing functional limitations): 1. Decreased Transfer Abilities 2. Decreased Ambulation Ability/Technique 3. Decreased Balance 4. Decreased Activity Tolerance 5. Decreased [place] with Home Exercise Program 
 INTERVENTIONS PLANNED: (Benefits and precautions of physical therapy have been discussed with the patient.) 1. Balance Exercise 2. Bed Mobility 3. Gait Training 4. Therapeutic Activites 5. Therapeutic Exercise/Strengthening 6. Transfer Training 7. education TREATMENT PLAN: Frequency/Duration: 3 times a week for duration of hospital stay Rehabilitation Potential For Stated Goals: Good REHAB RECOMMENDATIONS (at time of discharge pending progress):   
Placement: It is my opinion, based on this patient's performance to date, that Mr. Benita Apodaca may benefit from intensive therapy at a [address] [place] [address] after discharge due to the functional deficits listed above that are likely to improve with skilled rehabilitation and concerns that he/she may be unsafe to be unsupervised at home due to decreased mobility. Equipment:  
? Off-loading shoe. HISTORY:  
History of Present Injury/Illness (Reason for Referral): 
Per MD note, \"Chief complaint: Chills, dyspnea, fever, hypotension HPI: Morbidly obese patient on chronic 2-3 L home O2 recently discharged from this hospital by Dr. Marbella Martins vascular surgery service May 15 following right fourth left second and third toe amputations secondary to gangrene. Patient had aortic valve replacement with a bioprosthetic aortic valve and postop was noted to have gangrenous toes and has significant peripheral vascular disease. Longstanding diabetes with diabetic nephropathy chronic kidney disease stage III with May 8 serum creatinine being 1.5. Chronic anticoagulation with warfarin for paroxysmal A. fib and recently aortic valve replacement but bioprosthetic. He has a permanent pacemaker, status post coronary artery bypass graft, bilateral carotid stenosis, and recent diagnosis of heparin-induced thrombocytopenia. He did see cardiology Friday2 days ago and claims medications were changed but he cannot tell me which ones. He cannot tell me the dose of his warfarin. I do have a discharge summary and reviewed MAR. He apparently was on the toilet and had near syncope, EMS was notified. Patient reports symptoms of dyspnea dyspnea on exertion chills and EMS reported documented fever but I could not locate documentation. On arrival to emergency department he is found to have a brain natruretic peptide of 4134 with recent prior being 2600 May 15. And chest radiographic appearance of bilateral alveolar edema consistent with acute pulmonary edema/CHF. He has preserved LVEF on his recent echocardiogram to evaluate his bioprosthetic aortic valve. His EKG is read as sinus tach although P waves are not easily identified his rhythm is regular. He has a history of paroxysmal A. fib chronically. His serum lactate level is 3 he has acute kidney injury with serum creatinine 2.0 his glucose is 200. His hemoglobin is 8 white blood count is 15,000 with left shift. His ABG is not bad with pH of 7.37 PCO2 of 47 PaO2 of 67 on 3 L 92% but clinical decompensation since that time according to nursing in the ER. He is currently unable to speak complete sentences and pulse ox is below 90 on nasal cannula and blood pressure is borderline. I spoke with ER nurses and Turner catheter and 40 mg IV Lasix to be ordered. He is being admitted to the intensive care unit with severe sepsis with lactic acidosis and acute kidney injury likely osteomyelitis source has bilateral feet are malodorous. Imaging studies consistent with possible osteomyelitis. He also has acute pulmonary edema and borderline hypotension. He will be admitted to the unit despite DNR status due to requested use of BiPAP if needed, and possible pressor support. For now we will continue vancomycin as single agent but will seek opinion of intensivist and will consult ID, cardiology, vascular, wound care. Pharmacy will manage warfarinstat PT INR pending. Other significant lab data includes procalcitonin level at 8.   He was not tested for COVID-19difficult to obtain adequate history due to patient respiratory distress at time of my evaluation. It is noted that he was discharged with Midrin as well as Florinef. Florinef will obviously be held but we may try Midrin to avoid need for pressor support. Will await evaluation from intensivist/critical care. Call placed\" Past Medical History/Comorbidities:  
Mr. Aquiles Moreira  has a past medical history of Acute renal failure on dialysis [facility]) (2/25/2020), Arthritis, BPH (benign prostatic hyperplasia) (1/14/2013), CAD (coronary artery disease), DM type 2 (diabetes mellitus, type 2) ([facility] (dx 2004), Dyspnea, Gout (1/14/2013), HLD (hyperlipidemia) (1/14/2013), HTN (hypertension), Morbid obesity ([facility] (9/3/14), Oxygen dependent, Psychiatric disorder, Rheumatic fever, Seasonal allergic rhinitis, Severe aortic valve stenosis, Thyroid disease, and Unspecified sleep apnea (2016). Mr. Aquiles Moreira  has a past surgical history that includes hx tonsil and adenoidectomy; ir insert tunl cvc w port over 5 years (2/4/2020); hx heart catheterization (12/23/2019); vascular surgery procedure unlist; hx orthopaedic (Left); and hx cataract removal (Bilateral, 2012). Social History/Living Environment:  
Home Environment: Private residence # Steps to Enter: 2 One/Two Story Residence: One story Living Alone: No 
Support Systems: Spouse/Significant Other/Partner Patient Expects to be Discharged to[de-identified] Private residence Current DME Used/Available at Home: Oxygen, portable, Wheelchair, Nebulizer, Walker, [employer] chair, Commode, bedside Prior Level of Function/Work/Activity: He recently (5/27/20) underwent debridement of R forefoot by vasculature surgery and needs to be heel WBing per chart. Patient reports that he ambulates with RW independently in home but does require assistance with sit to supine to lift his LE's onto the bed. Number of Personal Factors/Comorbidities that affect the Plan of Care: 3+: HIGH COMPLEXITY EXAMINATION:  
 Most Recent Physical Functioning:  
Gross Assessment: 
  
         
  
Posture: 
  
Balance: 
Sitting: Intact Standing: Impaired Standing - Static: Good Standing - Dynamic : Fair Bed Mobility: 
Supine to Sit: Stand-by assistance Scooting: Minimum assistance Wheelchair Mobility: 
  
Transfers: 
Sit to Stand: Contact guard assistance Stand to Sit: Contact guard assistance Gait: 
Right Side Weight Bearing: Heel Base of Support: Center of gravity altered; Widened Speed/[place]: Slow Gait Abnormalities: Decreased step clearance;Trunk sway increased; Shuffling gait; Step to gait Distance (ft): 10 Feet (ft) Assistive Device: Walker, rolling;Gait belt Ambulation - Level of Assistance: Contact guard assistance Interventions: Safety awareness training; Tactile cues; Verbal cues Body Structures Involved: 1. Heart 2. Bones 3. Joints 4. Muscles Body Functions Affected: 1. Cardio 2. Movement Related 3. Skin Related Activities and Participation Affected: 1. Mobility 2. Self Care 3. Domestic Life Number of elements that affect the Plan of Care: 4+: HIGH COMPLEXITY CLINICAL PRESENTATION:  
Presentation: Evolving clinical presentation with changing clinical characteristics: MODERATE COMPLEXITY CLINICAL DECISION MAKIN[address] [place] [address]-[facility] 6 Clicks Basic Mobility Inpatient Short Form How much difficulty does the patient currently have. .. Unable A Lot A Little None 1. Turning over in bed (including adjusting bedclothes, sheets and blankets)? [] 1   [] 2   [x] 3   [] 4  
2. Sitting down on and standing up from a chair with arms ( e.g., wheelchair, bedside commode, etc.)   [] 1   [] 2   [x] 3   [] 4  
3. Moving from lying on back to sitting on the side of the bed? [] 1   [] 2   [x] 3   [] 4 How much help from another person does the patient currently need. .. Total A Lot A Little None 4. Moving to and from a bed to a chair (including a wheelchair)?    [] 1   [] 2   [x] 3   [] 4  
 5.  Need to walk in hospital room? [] 1   [x] 2   [] 3   [] 4  
6. Climbing 3-5 steps with a railing? [] 1   [x] 2   [] 3   [] 4  
© 2007, Trustees of [address] [place] [address] [address] [address], under license to [employer]. All rights reserved Score:  Initial: 16 Most Recent: X (Date: -- ) Interpretation of Tool:  Represents activities that are increasingly more difficult (i.e. Bed mobility, Transfers, Gait). Medical Necessity:    
· Patient is expected to demonstrate progress in  
· functional technique and activity tolerance ·  to  
· increase independence with   and improve safety during all functional mobility · . Reason for Services/Other Comments: 
· Patient continues to require skilled intervention due to · medical complications and patient unable to attend/participate in therapy as expected · . Use of outcome tool(s) and clinical judgement create a POC that gives a: Clear prediction of patient's progress: LOW COMPLEXITY  
  
 
 
 
TREATMENT:  
  
Pre-treatment Symptoms/Complaints: \"I suppose I should move a bit. \" 
Pain: Initial:  
Pain Intensity 1: 0  Post Session:  0 Therapeutic Activity: (    25 Minutes): Therapeutic activities including bed mobility training, transfer training, ambulation on level ground, static/dynamic standing balance, scooting, posture training, instruction in heel weight bearing and sequencing with rolling walker, LE exercises, and patient education to improve mobility, strength and balance. Required moderate Safety awareness training; Tactile cues; Verbal cues to promote static and dynamic balance in standing and promote coordination of bilateral, lower extremity(s). Therapeutic Exercise: (  minutes):  Exercises per grid below to improve mobility and strength. Required minimal visual and verbal cues to promote proper body alignment. Progressed complexity of movement as indicated. DATE: 5/30/20 6/2/20 Ambulation Hip Flexion X20 AB 2x15B A     
 Long Arc Quads X20 AB 2x15B A Hip ab/ad  2x15B A Heel Raises X20 AB 2x15B A Toe Raises X20 AB 2x15B A Key:  A=active, AA=active assisted, P=passive, B=bilaterally, R=right, L=left DF=dorsiflexion, PF=plantarflexion Braces/Orthotics/Lines/Etc:  
· O2 Device: Hi flow nasal cannula Treatment/Session Assessment:   
· Response to Treatment: see above · Interdisciplinary Collaboration:  
o Physical Therapy Assistant 
o Registered Nurse · After treatment position/precautions:  
o Up in chair 
o Bed/Chair-wheels locked 
o Call light within reach 
o RN notified · Compliance with Program/Exercises: Compliant all of the time, Will assess as treatment progresses · Recommendations/Intent for next treatment session: \"Next visit will focus on advancements to more challenging activities and reduction in assistance provided\". Total Treatment Duration: PT Patient Time In/Time Out Time In: 5782 Time Out: 3517 Joni Vee PT, DPT

## 2020-06-06 NOTE — PROGRESS NOTES
Progress Note Patient: Ernestina Paget MRN: [id number]  SSN: [id number] YOB: 1945  Age: 69 y.o. Sex: male Admit Date: 5/24/2020 LOS: 13 days Subjective: F/U MRSA bacteremia 68 yo hx of DVT, HIT, a fib, morbid obesity on chronic 2-3L NC at home, sleep apnea, osteomyelitis, bradycardia s/p pacer, CKD stage III baseline creatine 2.4, s/p aortic valve replacement with bioprosthetic valve and CABG January of 2020, paroxysmal a fib on warfarin, chronic gangrenous toes, recent decubitus ulcer that has healed,  PVD, pacemaker, depression, carotid stenosis who presented to the ED for cc near syncope. Patient found to be in severe sepsis, pulmonary edema, DEJUAN, along with malodorous foot wounds. Recently had removal of right 4th and left 2nd and 3rd digits on May 15th by Dr. Angie White. Blood cultures growing MRSA. Wound cultures growing e faecalis, e coli, and MRSA. Repeat blood cultures on 5/28 no growth. COVID negative. Pulmonology also following for his pulmonary edema but no indication for thoracentesis. ID following. TERRI performed on 6/2/20 and found vegetations to his aortic valve along with pacemaker. Pacemaker interrogated and has atrial paced 0% and V paced 11% so may be a candidate for extraction. High risk for aortic valve replacement. On 6/3/20 had acute episode of hypoxia. CT chest PE protocol showed no PE. Fluid seen on chest x ray and BiPAP placed along with lasix which improved SOB. Patient now on 3L NC from 15L. Patient and family would like hospice at home. Plan for dc Sunday if everything set up. Feeling tired. NO chest pain or SOB. He would like to keep Turner even after discharge. Current Facility-Administered Medications Medication Dose Route Frequency  vancomycin (VANCOCIN) 500 mg in 0.9% sodium chloride 100 mL IVPB  500 mg IntraVENous Q24H  warfarin (COUMADIN) tablet 2.5 mg  2.5 mg Oral QPM  
  atorvastatin (LIPITOR) tablet 40 mg  40 mg Oral QHS  [Held by provider] furosemide (LASIX) tablet 40 mg  40 mg Oral DAILY  fentaNYL citrate (PF) injection 25-50 mcg  25-50 mcg IntraVENous Multiple  lidocaine (XYLOCAINE) 2 % viscous solution 15 mL  15 mL Mouth/Throat PRN  polyethylene glycol (MIRALAX) packet 17 g  17 g Oral DAILY  bisacodyL (DULCOLAX) tablet 5 mg  5 mg Oral DAILY PRN  
 ondansetron (ZOFRAN) injection 4 mg  4 mg IntraVENous Q6H PRN  
 0.9% sodium chloride infusion 250 mL  250 mL IntraVENous PRN  
 lidocaine (XYLOCAINE) 10 mg/mL (1 %) injection 0.1 mL  0.1 mL SubCUTAneous PRN  
 sodium chloride (NS) flush 5-40 mL  5-40 mL IntraVENous Q8H  
 sodium chloride (NS) flush 5-40 mL  5-40 mL IntraVENous PRN  
 0.9% sodium chloride infusion 250 mL  250 mL IntraVENous PRN  
 ALPRAZolam (XANAX) tablet 0.5 mg  0.5 mg Oral BID PRN  
 famotidine (PEPCID) tablet 20 mg  20 mg Oral DAILY  insulin glargine (LANTUS) injection 25 Units  25 Units SubCUTAneous QHS  NUTRITIONAL SUPPORT ELECTROLYTE PRN ORDERS   Does Not Apply PRN  
 meropenem (MERREM) 500 mg in 0.9% sodium chloride (MBP/ADV) 50 mL  0.5 g IntraVENous Q8H  
 sodium chloride (NS) flush 5-40 mL  5-40 mL IntraVENous Q8H  
 sodium chloride (NS) flush 5-40 mL  5-40 mL IntraVENous PRN  
 acetaminophen (TYLENOL) tablet 650 mg  650 mg Oral Q4H PRN  
 albuterol (PROVENTIL VENTOLIN) nebulizer solution 2.5 mg  2.5 mg Nebulization Q6H RT  
 insulin lispro (HUMALOG) injection   SubCUTAneous AC&HS  
 buPROPion (WELLBUTRIN) tablet 75 mg  75 mg Oral BID  midodrine (PROAMATINE) tablet 10 mg  10 mg Oral TID  OLANZapine (ZyPREXA) tablet 5 mg  5 mg Oral QHS  mirtazapine (REMERON) tablet 15 mg  15 mg Oral QHS  sertraline (ZOLOFT) tablet 100 mg  100 mg Oral DAILY  tamsulosin (FLOMAX) capsule 0.4 mg  0.4 mg Oral DAILY Objective:  
 
Vitals:  
 06/06/20 9096 06/06/20 0430 06/06/20 2242 06/06/20 0820 BP:  129/48  144/71 Pulse:  78  85 Resp:  24  21 Temp:  98.3 °F (36.8 °C)  98.4 °F (36.9 °C) SpO2: 97% 97% 99% Weight:  112.7 kg (248 lb 8 oz) Height:      
  
  
Intake and Output: 
Current Shift: No intake/output data recorded. Last three shifts: 06/04 1901 - 06/06 0700 In: -  
Out: 9622 [HULLN:[id number]] Physical Exam:  
General:  Alert, cooperative, tired appearing Eyes:  Conjunctivae/corneas clear. Ears:  Normal TMs and external ear canals both ears. Nose: Nares normal. Septum midline. Mouth/Throat: Lips, mucosa, and tongue normal.   
Neck:  no JVD. Back:   deferred Lungs:   CTA bilaterally with limited breathe sounds. Heart:  Regular rate and rhythm, S1, S2 normal  
Abdomen:   Soft, non-tender. Bowel sounds normal. Morbidly obese. Extremities: Clean bandages to feet Pulses: 2+ and symmetric all extremities. Skin: As above. Lymph nodes: Cervical, supraclavicular, and axillary nodes normal.  
Neurologic: CNII-XII intact. Lab/Data Review: 
 
Recent Results (from the past 24 hour(s)) GLUCOSE, POC Collection Time: 06/05/20 12:12 PM  
Result Value Ref Range Glucose (POC) 144 (H) 65 - 100 mg/dL GLUCOSE, POC Collection Time: 06/05/20  4:38 PM  
Result Value Ref Range Glucose (POC) 217 (H) 65 - 100 mg/dL GLUCOSE, POC Collection Time: 06/05/20  8:12 PM  
Result Value Ref Range Glucose (POC) 216 (H) 65 - 100 mg/dL Ruperto Quinn Collection Time: 06/05/20  9:16 PM  
Result Value Ref Range Vancomycin,trough 18.6 5 - 20 ug/mL PROTHROMBIN TIME + INR Collection Time: 06/06/20  5:45 AM  
Result Value Ref Range Prothrombin time 29.1 (H) 12.0 - 14.7 sec INR 2.7 GLUCOSE, POC Collection Time: 06/06/20  8:16 AM  
Result Value Ref Range Glucose (POC) 90 65 - 100 mg/dL Assessment/ Plan: Active Problems: 
  Obesity, morbid ([facility] (10/19/2018) Type 2 diabetes with nephropathy ([facility] (8/26/2019) CAD (coronary artery disease) (1/10/2020) Overview: CABG (1/16/20):  LIMA to LAD. SVG to OM. SVG to PDA. Acute respiratory failure with hypoxemia ([address] (1/10/2020) S/P CABG x 3 (1/10/2020) Status post aortic valve replacement (1/10/2020) Overview: 1. AVR (1/16/20):  25 mm Intuity Jon Gutierres Valve. 2.  Echo (4/12/20):  EF 55-60%. Normal functioning AVR. MG 15. PG 26. Mild MR. Atrial fibrillation ([address] (1/13/2020) Overview: Post op CABG. HIT (heparin-induced thrombocytopenia) ([address] (1/23/2020) Hypotension (2/18/2020) DNR (do not resuscitate) (2/26/2020) Long term (current) use of anticoagulants (4/27/2020) Pacemaker (4/27/2020) Overview: Dual chamber Biotronik pacemaker placed (1/24/20): AV block post op AVR. Gangrene of toe of both feet ([address] (5/11/2020) PAD (peripheral artery disease) ([address] (5/14/2020) Osteomyelitis ([address] (5/24/2020) DEJUAN (acute kidney injury) ([address] (5/24/2020) Pulmonary edema, noncardiac (5/25/2020) MRSA bacteremia - From foot wounds versus possible old sacral wound. Patient does not want further amputations. TERRI showed vegetation to aortic valve and pacer. Patient states he does not want surgery for pacer or aortic valve replacement stating he thinks he will die anyway. Family would like hospice. They do not want antibiotics on day of discharge. Right foot infection - s/p debridement on 5/27. Wound care following. DM type II  - A1C 9.6. Non compliant. Lantus 25 units qHS that I will reduce to 15 since glucose this AM 90, SS. Paroxysmal a fib - Warfarin. Depression - Wellbutrin. Olanzapine. Zoloft. Midodrine ECHO showed EF 55%, RV pressure 25-30mmHg, Looking to dc tomorrow home with hospice if everything set up. Will not go home with any medications. They still want medications while in hospital.  
 
DVT prophylaxis - Warfarin. Signed By: Lisseth Pollock,    
 June 6, 2020

## 2020-06-06 NOTE — PROGRESS NOTES
Vancomycin Pharmacokinetic Consult Fred Nielsen is a 69 y.o. male being treated for MRSA bacteremia with AVIE and SSTI with meropenem and vancomycin. Height: 5' 10\" (177.8 cm)  Weight: 112.7 kg (248 lb 8 oz) Lab Results Component Value Date/Time BUN 50 (H) 06/05/2020 06:21 AM  
 Creatinine 2.21 (H) 06/05/2020 06:21 AM  
 WBC 19.1 (H) 06/04/2020 10:32 AM  
 Procalcitonin 8.17 05/24/2020 04:04 PM  
 Lactic acid 1.6 05/25/2020 01:08 AM  
  
Estimated Creatinine Clearance: 37.4 mL/min (A) (based on SCr of 2.21 mg/dL (H)). CULTURES: 
5/24 - Blood x2: 2/2 MRSA 
5/24 - Urine: 6K Candida albicans 5/24 - Wound: ESBL E. coli, MRSA, E. Maty Yarsani 5/25 - Blood x2: 2/2 MRSA 
5/28 - Blood x2: NG 
5/31 - COVID-19: (-) 
6/3 - Blood x1: C. Albicans Lab Results Component Value Date/Time Vancomycin,trough 18.6 06/05/2020 09:16 PM  
 Vancomycin, random 16.7 06/01/2020 12:53 PM  
 
Day 12 of vancomycin. Goal trough is 15-20. Patient currently receiving vancomycin 750 mg Q24H. Trough returned within goal range yesterday at 18.6 mcg/mL. Scr continuing to trend up (1.42 < 1.51 < 2.17 < 2.21). Will reduce dose to 500 mg in anticipation of further accumulation. Will continue to follow patient and order levels when clinically indicated. 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 Per discussion with Dr. Jacquie Heredia (Cardiology) on 6/3/2020, will target goal 2 to 3 for now. Home dose:  5 mg SuTuTh and 7.5 mg all other days (45 mg total weekly dose) Risk factors or significant drug interactions:  N/A Other anticoagulants:  N/A Daily Monitoring Date  INR     Warfarin dose HGB              Notes 5/25  7.4  Hold  8.5 2.5 mg PO Vit K admin 5/26  >9.9  Hold  7.0 10 mg PO Vit K admin 5/27  2.4  Hold  6.8        1 unit PRBC transfused 5/28  2  Hold  7.6  1 unit PRBC transfused 5/29  1.8  5 mg  9.5 5/30  1.2  5 mg   10.2 
5/31  1.3  5 mg   9.9 6/1  1.7  5 mg  -- 
6/2  1.8  7.5 mg  9.3 6/3  1.9  5 mg  9.8 
6/4  2.4  4 mg  9.6 
6/5   2.1  4 mg  --- 
6/6  2.7  2.5 mg  --- Patient with labile INR since admission. Hypoalbuminemia may be contributory. INR up from 2.1 yesterday to 2.7 today. No new DDIs. Will reduce dose to 2.5 mg this evening and continue to follow daily INR. Salvador Vega, PharmD, BCPS Clinical Pharmacist 
[phone]

## 2020-06-06 NOTE — PROGRESS NOTES
Pt is to go home with family and [facility] tomorrow. [facility] transport is set for 11am.  MD and RN aware. DNR form left in pt chart for signatures.

## 2020-06-06 NOTE — HOSPICE
Patient approved for RHC with [facility]. Admission scheduled for 12p on Sunday 6/7/20. Consents will be signed in the home. Needed equipment scheduled to be delivered today. Will contact Lita GÓMEZ to update. Will need signed DNR and transport set for discharge. Any questions please call [phone] or the office at [phone]. Thanks, Isadora Gatica RN BSN [facility]

## 2020-06-06 NOTE — PROGRESS NOTES
Hourly rounds completed throughout this shift. Pt did not have any fever this shift. Pt resting in bed; denies needs at this time. Will continue to monitor and report to oncoming night shift nurse.

## 2020-06-07 PROBLEM — B95.62 MRSA BACTEREMIA: Status: ACTIVE | Noted: 2020-01-01

## 2020-06-07 PROBLEM — I38 ENDOCARDITIS: Status: ACTIVE | Noted: 2020-01-01

## 2020-06-07 PROBLEM — R78.81 MRSA BACTEREMIA: Status: ACTIVE | Noted: 2020-01-01

## 2020-06-07 NOTE — PROGRESS NOTES
Pt's son at bedside this am to prepare for pt discharge home. Pt is weaker that son was aware of and it was determined after son spoke with hospice again by phone that pt would be transported home today via [facility] with DNR at 2pm after hospital bed is delivered. Care Management Interventions PCP Verified by CM: Yes Mode of Transport at Discharge: [facility] with DNR ) Transition of Care Consult (CM Consult): Home Hospice [facility] - INPATIENT: No 
[facility]: Yes Reason Outside [place]: Patient already serviced by other home care/hospice agency Discharge Durable Medical Equipment: Yes(hospital bed and any other needed DME will be provided by [facility]) Physical Therapy Consult: Yes Occupational Therapy Consult: Yes Speech Therapy Consult: No 
Current Support Network: Lives with Arielle Wake[place] -[place], [phone], has another son, Edwige Castro) Confirm Follow Up Transport: Family The Plan for Transition of Care is Related to the Following Treatment Goals : Support for end of life care The Patient and/or Patient Representative was Provided with a Choice of Provider and Agrees with the Discharge Plan?: Yes Name of the Patient Representative Who was Provided with a Choice of Provider and Agrees with the Discharge Plan: pt/son Freedom of Choice List was Provided with Basic Dialogue that Supports the Patient's Individualized Plan of Care/Goals, Treatment Preferences and Shares the Quality Data Associated with the Providers?: Yes Discharge Location Discharge Placement: Home with hospice(Home with [facility] in home hospice care.)

## 2020-06-07 NOTE — DISCHARGE SUMMARY
Hospitalist Discharge Summary Patient ID: 
Ahmet Carranza [id number] 
23 y.o. 
1945 Admit date: 5/24/2020  4:04 PM 
Discharge date and time: 6/7/2020 Attending: Cora Whitman DO 
PCP:  Anne White MD 
Treatment Team: Attending Provider: Cora Whitman DO; Consulting Provider: Kenia Nance MD; Consulting Provider: Stephanie Ruiz MD; Utilization Review: María Elena Peacock RN; Charge Nurse: Ricardo Celestin; Consulting Provider: Quincy Bass MD; Care Manager: Bre Ruiz RN; Consulting Provider: Juvencio Jeter MD; Occupational Therapist: Ana Iglesias Principal Diagnosis Endocarditis Principal Problem: 
  Endocarditis (6/7/2020) Active Problems: 
  Obesity, morbid ([address] (10/19/2018) Type 2 diabetes with nephropathy ([address] (8/26/2019) CAD (coronary artery disease) (1/10/2020) Overview: CABG (1/16/20):  LIMA to LAD. SVG to OM. SVG to PDA. Acute respiratory failure with hypoxemia ([address] (1/10/2020) S/P CABG x 3 (1/10/2020) Status post aortic valve replacement (1/10/2020) Overview: 1. AVR (1/16/20):  25 mm Intuity Jon Gutierres Valve. 2.  Echo (4/12/20):  EF 55-60%. Normal functioning AVR. MG 15. PG 26. Mild MR. Atrial fibrillation ([address] (1/13/2020) Overview: Post op CABG. HIT (heparin-induced thrombocytopenia) ([address] (1/23/2020) Hypotension (2/18/2020) DNR (do not resuscitate) (2/26/2020) Long term (current) use of anticoagulants (4/27/2020) Pacemaker (4/27/2020) Overview: Dual chamber Biotronik pacemaker placed (1/24/20): AV block post op AVR. Gangrene of toe of both feet ([address] (5/11/2020) PAD (peripheral artery disease) ([address] (5/14/2020) DEJUAN (acute kidney injury) ([facility] (5/24/2020) Pulmonary edema, noncardiac (5/25/2020) MRSA bacteremia (6/7/2020)  Hospital Course: 68 yo hx of DVT, HIT, a fib, morbid obesity on chronic 2-3L NC at home, sleep apnea, bradycardia s/p pacer, CKD stage III baseline creatine 2.4, s/p aortic valve replacement with bioprosthetic valve and CABG January of 2020, paroxysmal a fib on warfarin, chronic gangrenous toes, recent decubitus ulcer that has healed,  PVD, pacemaker, depression, carotid stenosis who presented to the ED for cc near syncope. Patient found to be in severe sepsis, pulmonary edema, DEJUAN, along with malodorous foot wounds. Recently had removal of right 4th and left 2nd and 3rd digits on May 15th by Dr. Virgen Swain. Blood cultures growing MRSA. Wound cultures growing e faecalis, e coli, and MRSA. Repeat blood cultures on 5/28 no growth. Sensitivity showed resistance to all oral antibiotics except Zyvox which is not a good long term antibiotic option. Was treated with meropenem and vancomycin while in hospital. COVID negative. Pulmonology also followed for his pulmonary edema but no indication for thoracentesis. ID following. TERRI performed on 6/2/20 and found vegetations to his aortic valve along with pacemaker. Pacemaker interrogated and has atrial paced 0% and V paced 11% so may be a candidate for extraction. High risk for aortic valve replacement. On 6/3/20 had acute episode of hypoxia. CT chest PE protocol showed no PE. Fluid seen on chest x ray and BiPAP placed along with lasix which improved SOB. Patient now on 3L NC from 15L. Patient and family would like hospice at home. Patient states he does not want to be on long term antibiotics and is tired so just wants to be comfortable. Please refer to the admission H&P for details of presentation. In summary, the patient is stable for discharge. Significant Diagnostic Studies:  
 
 
Labs: Results:  
   
Chemistry Recent Labs  
  06/05/20 
1296 06/04/20 
1032 * 193*  136  
K 4.0 4.5 CL 99 98  
CO2 31 34* BUN 50* 51* CREA 2.21* 2.17* CA 8.5 7.9* AGAP 6* 4*  
  
CBC w/Diff Recent Labs  
  06/04/20 
1032 WBC 19.1*  
RBC 3.49* HGB 9.6* HCT 32.1*  
 GRANS 88* LYMPH 8*  
EOS 0* Cardiac Enzymes No results for input(s): CPK, CKND1, POOJA in the last 72 hours. No lab exists for component: Malick Traylor Coagulation Recent Labs  
  06/06/20 
0545 06/05/20 
4594 PTP 29.1* 24.5* INR 2.7 2.1 Lipid Panel Lab Results Component Value Date/Time Cholesterol, total 123 09/04/2019 09:07 AM  
 HDL Cholesterol 32 (L) 09/04/2019 09:07 AM  
 LDL, calculated 68 09/04/2019 09:07 AM  
 VLDL, calculated 23 09/04/2019 09:07 AM  
 Triglyceride 167 (H) 01/20/2020 03:14 AM  
  
BNP No results for input(s): BNPP in the last 72 hours. Liver Enzymes No results for input(s): TP, ALB, TBIL, AP in the last 72 hours. No lab exists for component: SGOT, GPT, DBIL Thyroid Studies Lab Results Component Value Date/Time T4, Total 4.2 (L) 05/20/2016 10:09 AM  
 T3 Uptake 33 05/20/2016 10:09 AM  
 TSH 0.808 03/10/2020 07:30 AM  
    
 
 
Discharge Exam: 
Visit Vitals /66 Pulse (!) 126 Temp 98.6 °F (37 °C) Resp 22 Ht 5' 10\" (1.778 m) Wt 112.5 kg (248 lb) SpO2 90% BMI 35.58 kg/m² General appearance: alert, cooperative, no distress Lungs: clear to auscultation bilaterally Heart: regular rate and rhythm, S1, S2 normal, no murmur Abdomen: soft, non-tender. Bowel sounds normal.  
Extremities: healing wounds to feet. Neurologic: Limited ROM in bed. Disposition:Home hospice. Discharge Condition: stable Patient Instructions: As above Current Discharge Medication List  
  
START taking these medications Details LORazepam (ATIVAN) 1 mg tablet Take 1 Tab by mouth every four to six (4-6) hours as needed for Anxiety. Max Daily Amount: 6 mg. Qty: 12 Tab, Refills: 0 Associated Diagnoses: Anxiety  
  
scopolamine (TRANSDERM-SCOP) 1 mg over 3 days pt3d 1 Patch by TransDERmal route every seventy-two (72) hours. Qty: 1 Patch, Refills: 0 Associated Diagnoses: Salivary secretion morphine (ROXANOL) 100 mg/5 mL (20 mg/mL) concentrated solution Take 0.5 mL by mouth every four (4) hours as needed for Pain for up to 3 days. Max Daily Amount: 60 mg. 
Qty: 9 mL, Refills: 0 Associated Diagnoses: Pain STOP taking these medications Jantoven 7.5 mg tablet Comments:  
Reason for Stopping:   
   
 Jantoven 5 mg tablet Comments:  
Reason for Stopping:   
   
 furosemide (LASIX) 40 mg tablet Comments:  
Reason for Stopping: ALPRAZolam (XANAX) 0.5 mg tablet Comments:  
Reason for Stopping:   
   
 sertraline (ZOLOFT) 100 mg tablet Comments:  
Reason for Stopping:   
   
 potassium chloride (KLOR-CON) 10 mEq tablet Comments:  
Reason for Stopping:   
   
 tamsulosin (Flomax) 0.4 mg capsule Comments:  
Reason for Stopping: OLANZapine (ZyPREXA) 5 mg tablet Comments:  
Reason for Stopping:   
   
 mirtazapine (Remeron) 15 mg tablet Comments:  
Reason for Stopping:   
   
 insulin glargine (Lantus Solostar U-100 Insulin) 100 unit/mL (3 mL) inpn Comments:  
Reason for Stopping:   
   
 buPROPion (WELLBUTRIN) 75 mg tablet Comments:  
Reason for Stopping:   
   
 insulin aspart U-100 (NovoLOG) 100 unit/mL crtg Comments:  
Reason for Stopping:   
   
 fludrocortisone (FLORINEF) 0.1 mg tablet Comments:  
Reason for Stopping:   
   
 midodrine (PROAMATINE) 10 mg tablet Comments:  
Reason for Stopping:   
   
  
 
 
Activity:Bedrest 
Diet:Regular Wound Care:Local wound care to feet Follow-up hospice in next week. · Time spent to discharge patient 35 minutes Signed: 
Chance Baker DO 
6/7/2020 
8:36 AM

## 2020-06-07 NOTE — PROGRESS NOTES
06/06/20 2145 Oxygen Therapy O2 Sat (%) 97 % Pulse via Oximetry 82 beats per minute O2 Device BIPAP  
FIO2 (%) 40 % Respiratory Respiratory (WDL) X Respiratory Pattern Regular Breath Sounds Bilateral Diminished Cough Non-productive CPAP/BIPAP  
CPAP/BIPAP Start/Stop On Device Mode BIPAP;S/T  
$$ Bipap Daily Yes Mask Type and Size Full face; Medium Skin Condition intact PIP Observed 17 cm H20 IPAP (cm H2O) 16 cm H2O  
EPAP (cm H2O) 8 cm H2O Inspiratory Time (sec) 1 seconds Vt Spont (ml) 481 ml Ve Observed (l/min) 11 l/min Backup Rate 12 Total RR (Spontaneous) 20 breaths per minute Insp Rise Time (sec) 3 Leak (Estimated) 24 L/min  
Pt's Home Machine No  
Biomedical Check Performed Yes Settings Verified Yes Alarm Settings High Pressure 40 Low Pressure 2 Apnea 20 Low Ve 4 High Rate 50 Low Rate 8 Pulmonary Toilet Pulmonary Toilet H. O.B elevated

## 2020-06-07 NOTE — PROGRESS NOTES
[facility] CARDIOLOGY PROGRESS NOTE 
      
 
6/7/2020 12:46 PM 
 
Admit Date: 5/24/2020 Subjective:  
 
Appears patient has opted for home hospice care. Patient states he cannot continue current course and realizes his poor prognosis Discussed case with son over the phone. No CP. On 3L NC 
 
ROS: 
Cardiovascular:  As noted above Objective:  
  
Vitals:  
 06/07/20 9369 06/07/20 0720 06/07/20 8456 06/07/20 1220 BP: 133/79  107/66 109/59 Pulse: 77  (!) 126 (!) 114 Resp: 20 22 20 Temp: 98 °F (36.7 °C)  98.6 °F (37 °C) 98.4 °F (36.9 °C) SpO2: 100% 96% 90% 92% Weight: 112.5 kg (248 lb) Height:      
 
 
Physical Exam: 
General-No Acute Distress Neck- supple, no JVD 
CV- regular rate and rhythm, + systolic ejection murmur Lung- clear bilaterally no wheezes Abd- soft, nontender, nondistended Ext- trace leg edema bilaterally. Skin- warm and dry Data Review:  
Recent Labs  
  06/06/20 
0545 06/05/20 
6732 NA  --  136 K  --  4.0  
BUN  --  50* CREA  --  2.21* GLU  --  115* INR 2.7 2.1 Assessment/Plan: Active Problems: 
  Obesity, morbid ([facility] (10/19/2018) Type 2 diabetes with nephropathy ([facility] (8/26/2019) - per primary CAD (coronary artery disease) (1/10/2020) S/P CABG x 3 (1/10/2020) -reinitiated high intensity statin therapy; stable Bacteremia - MRSA from  5/25/20;  cultures from 5/28/20 and 6/3/20 with NGTD; abx per ID 
  - ABX per ID Status post aortic valve replacement (1/10/2020) - valve with mass on aortic side of prosthesis , location atypical for infective endocarditis; consideration for thrombus/infected thrombus with bacteremia. Prior blood cultures positive for MRSA Noted blood culture from 6/3/2020+ for yeast pending identification; if contributing to noted echodensity on aortic valve further complicates the case/already poor prognosis. Usually in the setting of endocarditis with MRSA/possible fungus involving prosthesis, needs surgery but not candidate. Patient has opted to stop antibiotics and plan home hospice with plan for discharge today; previously discussed in the setting of prosthetic endocarditis, unlikely to clear infection without device removal/valve replacement Atrial fibrillation ([facility] (1/13/2020) On coumadin; hx of HIT; INR 2.4  
 
  HIT (heparin-induced thrombocytopenia) ([address] (1/23/2020) Pacemaker (4/27/2020) - mass on pacer lead on TERRI 6/2/2020; ?vegetation. Consideration for device extraction with no significant pacing on interrogation but given AV mass, unlikely to change outcome. - placed for severe symptomatic bradycardia on 1/24/2020 Gangrene of toe of both feet ([address] (5/11/2020) - per vascular surgery Edema 
-stable; component of hypoalbuminemia (1.8)/venous insuff; held lasix (6/4/20) with worsening renal function Extensive discussion with patient and son.  
 
Latonia Mcmillan MD 
6/7/2020 12:46 PM

## 2020-06-07 NOTE — PROGRESS NOTES
Hourly round completed throughout the shift. No complain of pain and denies any needs at this time Bed in lower position and call light/ personal items within reach. Will continue to monitor and give bed side shift report to on coming day shift nurse. Date 06/06/20 0700 - 06/07/20 2304 06/07/20 0700 - 06/08/20 2807 Shift 2029-9482 8567-6506 24 Hour Total 0911-7476 0352-9778 24 Hour Total  
INTAKE  
P.O.  240 240     
  P. O.  240 240 Shift Total(mL/kg)  240(2.1) 240(2.1) OUTPUT Urine(mL/kg/hr)  1600 1600 Urine Voided  1600 1600 Urine Occurrence(s)  1 x 1 x Stool Stool Occurrence(s) 1 x  1 x Shift Total(mL/kg)  0898(35.6) [id number]) NET  -1360 -1360 Weight (kg) 112.7 112.5 112.5 112.5 112.5 112.5

## 2020-06-07 NOTE — HOSPICE
Spoke with patients son Mandi Morrison after he requested patient be moved to [facility] instead of going home. Dr. Krunal Mata did not approve patient for GIP level of care at this time. Patients son has decided to proceed with original plan and take patient home with [facility]. Hospital bed to be delivered before 2pm. Transport has to be pushed back to 2pm. Admission nurse will be at the home around 2:30pm. Any questions please call office at [phone]. Lita GÓMEZ updated. Severiano Pique RN BSN [facility]

## 2020-06-07 NOTE — PROGRESS NOTES
pts' son at pt's bedside. Having difficulty deciding to take home. Pt's son feels as though he cannot care for him if pt's needs total assist. This nurse contacted CM. CM will alert hospice nurse to call pt's son. This nurse spoke to hospice nurse and updated her on pt. Awaiting call back from hospice nurse.

## 2020-06-08 NOTE — PROGRESS NOTES
This note will not be viewable in [address] No RITO outreach indicated due to discharge to hospice.

## 2020-06-22 NOTE — CDMP QUERY
Patient admitted with Sepsis and gangrene of both feet sp recent r foot toe amputation. Per Op note documentation of I&D--r foot amputation wound infection To accurately reflect the procedure performed please include the level to which the drainage was performed:  Laterality and depth  
 
? Skin only ? Subcutaneous and Fascia ? Muscle ? Bone 
? Other, please specify ? Unable to be determined The medical record reflects the following: 
 
  Risk Factors: diabetes, Sepsis, gangrene, PVD, r foot amputation wound infection Clinical Indicators: wbc-19.1, cr 2.21, procalcitonin-8.17, LA-3.0, CRP-20.5  Xray--r foot tissue swelling. Cannot rule out osteo. Treatment: incision & drainage of wound. Labs, iv abx. Cards, ID, wound care consults. Xray. Thank You, Divya Nieto [address] [place] [address]  
[phone]

## 2021-10-29 NOTE — PROGRESS NOTES
Warfarin dosing per pharmacist 
 
Rica Rivera is a 69 y.o. male. Height: 5' 10\" (177.8 cm)    Weight: 113.9 kg (251 lb 3.2 oz) Indication:  AVR and afib Goal INR:  2.5 - 3.5 Home dose:  2.5 mg daily Risk factors or significant drug interactions:  Levofloxacin (2/13-  ), amiodarone (dc'd 2/5) Other anticoagulants:  N/A Daily Monitoring Date  INR     Warfarin dose HGB              Notes 2/14  2.4  3 mg  8.7 2/15  2.2  4 mg  9.6 2/16  2.1  5 mg  9.8 2/17  2.1  5 mg  9.5 2/18  2.8  2 mg  9.3 2/19  2.5  2 mg  8.7 A/P:  INR therapeutic. Continue current dose of 2 mg every evening. Daily INRs. Pharmacy will continue to follow. Thank you, Lorena Hines, PharmD, BCCCP  [email or web address]  
 
 Patent

## 2021-11-17 NOTE — PROGRESS NOTES
[facility] PROGRESS NOTE Follow up for: DEJUAN/CKD Subjective:  
Patient seen and examined on dialysis. Tolerating well. ROS: 
Gen - no fever, no chills, appetite okay CV - no chest pain, no orthopnea Lung - no shortness of breath, no cough GI- no N/V/D Ext/Access - no edema Objective:  
Exam: 
Vitals:  
 03/21/20 0593 03/21/20 7568 03/21/20 0831 03/21/20 9458 BP: 109/63 117/78 116/75 118/69 Pulse: 79 79 82 82 Resp:      
Temp:      
SpO2:      
Weight:      
Height:      
 
 
 
Intake/Output Summary (Last 24 hours) at 3/21/2020 0908 Last data filed at 3/21/2020 6976 Gross per 24 hour Intake 820 ml Output 325 ml Net 495 ml Current Facility-Administered Medications Medication Dose Route Frequency  warfarin (COUMADIN) tablet 6 mg  6 mg Oral QPM  
 alum-mag hydroxide-simeth (MYLANTA) oral suspension 30 mL  30 mL Oral Q4H PRN  
 sertraline (ZOLOFT) tablet 100 mg  100 mg Oral DAILY  buPROPion [facility]) tablet 75 mg  75 mg Oral BID  loratadine (CLARITIN) tablet 10 mg  10 mg Oral DAILY  diphenhydrAMINE (BENADRYL) capsule 25 mg  25 mg Oral Q6H PRN  mirtazapine (REMERON) tablet 15 mg  15 mg Oral QHS  ALPRAZolam (XANAX) tablet 0.5 mg  0.5 mg Oral Q6H PRN  
 furosemide (LASIX) injection 80 mg  80 mg IntraVENous BID  
 bacitracin 500 unit/gram ointment   Topical BID  insulin lispro (HUMALOG) injection   SubCUTAneous AC&HS  
 dextrose 40% (GLUTOSE) oral gel 1 Tube  15 g Oral PRN  
 glucagon (GLUCAGEN) injection 1 mg  1 mg IntraMUSCular PRN  
 dextrose (D50W) injection syrg 12.5-25 g  25-50 mL IntraVENous PRN  
 HYDROcodone-homatropine (HYCODAN) 5-1.5 mg/5 mL (5 mL) syrup 5 mL  5 mL Oral Q4H PRN  
 benzonatate (TESSALON) capsule 100 mg  100 mg Oral TID PRN  
 guaiFENesin ER (MUCINEX) tablet 1,200 mg  1,200 mg Oral Q12H  
 albuterol (PROVENTIL VENTOLIN) nebulizer solution 2.5 mg  2.5 mg Nebulization Q6H RT  
  insulin glargine (LANTUS) injection 5 Units  5 Units SubCUTAneous QHS  sodium chloride (OCEAN) 0.65 % nasal squeeze bottle 2 Spray  2 Spray Both Nostrils Q2H PRN  
 morphine injection 2 mg  2 mg IntraVENous Q6H PRN  
 sodium chloride 3% hypertonic nebulizer soln  4 mL Nebulization BID RT  
 sodium chloride (OCEAN) 0.65 % nasal squeeze bottle 2 Spray  2 Spray Both Nostrils BID  epoetin maritza-epbx (RETACRIT) 14,000 Units combo injection  14,000 Units SubCUTAneous Q7D  
 polyethylene glycol (MIRALAX) packet 17 g  17 g Oral DAILY  midodrine (PROAMITINE) tablet 10 mg  10 mg Oral TID WITH MEALS  tamsulosin (FLOMAX) capsule 0.4 mg  0.4 mg Oral QHS  loperamide (IMODIUM) capsule 4 mg  4 mg Oral QID PRN  pantothenic ac-min oil-pet,hyd (AQUAPHOR) 41 % ointment   Topical DAILY  traMADol (ULTRAM) tablet 50 mg  50 mg Oral Q6H PRN  
 sodium chloride (NS) flush 5-40 mL  5-40 mL IntraVENous PRN  
 acetaminophen (TYLENOL) tablet 650 mg  650 mg Oral Q4H PRN  
 naloxone (NARCAN) injection 0.4 mg  0.4 mg IntraVENous PRN  
 ondansetron (ZOFRAN) injection 4 mg  4 mg IntraVENous Q4H PRN  
 bisacodyL (DULCOLAX) tablet 5 mg  5 mg Oral DAILY PRN  
 
 
EXAM 
GEN - Alert, oriented, in no distress CV - regular rate, S1, S2, no Rub Lung - clear to auscultation bilaterally Abd - soft, nontender, BS present Ext/ Access - no edema, Left TCC- intact Recent Labs  
  03/20/20 
0427 03/19/20 
0406 HGB 10.2* 9.8* HCT 32.1* 32.4* Recent Labs  
  03/21/20 
0558 03/20/20 
0427 03/19/20 
0406  136 136  
K 4.1 4.2 4.2  102 101 CO2 28 29 25 BUN 33* 23 34* CREA 5.11* 3.80* 5.08* CA 8.6 8.3 8.7 GLU 90 147* 114* Assessment and Plan:  
 
1) DEJUAN on CKD-  No sign of recovery. HD dependent since Jan, '20- likely ESRD 
-HD today. Next HD will be Tuesday. 2) Hypotension-  On midodrine prior to dialysis 3)  Hypoxic respiratory failure- on 3L O2, cxr low lung volumes with airspace consolidation left mid and lower lung. -ECHO 3/17 EF 55-60%, mild aortic stenosis 4) HIT positive- on argatroban Paulette Calderon MD 
 
 Speaking Coherently

## 2021-12-16 NOTE — PROGRESS NOTES
PA Initiation    Medication: Mavyret  Insurance Company: [employer] - Phone [phone] Fax [phone]  Pharmacy Filling the Rx: [facility] - [place], [place] - [facility] [address]  Filling Pharmacy Phone: [phone]  Filling Pharmacy Fax: [phone]  Start Date: 12/16/2021   Warfarin dosing per pharmacist 
 
Kelly Michaud is a 69 y.o. male. Height: 5' 10\" (177.8 cm)    Weight: 111.1 kg (245 lb) Indication:  Prevention of thromboembolism (prosthetic heart valves) Goal INR:  2 to 3 Home dose:  7.5 mg MWF, 5 mg all other days Risk factors or significant drug interactions:  None Other anticoagulants:  N/A *History of HIT Daily Monitoring Date  INR     Warfarin dose HGB              Notes 5/25  7.4  Hold  8.5 2.5 mg PO Vit K admin 5/26  >9.9  Hold  7.0 10 mg PO Vit K admin 5/27  2.4  Hold  6.8        1 unit PRBC transfused 5/28  2  Hold  7.6  1 unit PRBC transfused 5/29  1.8  5 mg  9.5 5/30  1.2  5 mg   10.2 
5/31  1.3  5 mg   9.9 6/1  1.7  5 mg  -- 
6/2  1.8  7.5 mg  9.3 Patient with labile INRs since admission, increased to > 9.9 on 5/26. INR down to 2 after 2 doses of Vit K given total this admission. INR still increasing, but trend is slowing down. Will give 7.5 mg this evening. Pharmacy will continue to follow patient and monitor INR daily. Thank you, 
Kelli Wade, PharmD, [facility] Clinical Pharmacist 
[phone]

## 2022-04-15 NOTE — PROGRESS NOTES
All locked restraints removed at this time       Angie Baltazar  04/15/22 1721 Today's Date: 2/10/2020 Date of Admission: 1/10/2020 Chart Reviewed. Subjective:  
 
Patient complains of feeling anxious/depressed and weak. He states when he tries to walk his legs start shaking and feel very weak. Medications Reviewed. Objective:  
 
Vitals:  
 02/10/20 0936 02/10/20 1000 02/10/20 1029 02/10/20 1101 BP: 113/66 108/42 108/58 115/68 Pulse: 85 85 83 87 Resp:      
Temp:      
SpO2:      
Weight:      
Height:      
 
 
Intake and Output Current Shift: No intake/output data recorded. Last 3 Shifts: 02/08 1901 - 02/10 0700 In: 1736.8 [P.O.:1470; I.V.:266.8] Out: 450 [Urine:450] Physical Exam: 
General: Well Developed, Well Nourished, No Acute Distress, Alert & Oriented x 3, Appropriate mood Neck: supple, no JVD Heart: S1S2 with RRR without murmurs or gallops Lungs: Clear throughout auscultation bilaterally Abd: soft, nontender, nondistended, with good bowel sounds Ext: + edema bilaterally Sternal incision: clean, dry, and intact Skin: warm and dry LABS Data Review:  
Recent Labs  
  02/10/20 
1833 02/10/20 
0340 02/09/20 
0335 NA  --  135* 135* K  --  4.2 4.0  
BUN  --  66* 52* CREA  --  6.78* 6.37* GLU  --  136* 140* WBC  --  10.0 9.4 HGB  --  7.9* 8.2* HCT  --  25.4* 26.4*  
PLT  --  184 183 INR 3.7* 6.3* 3.3 Estimated Creatinine Clearance: 12.6 mL/min (A) (based on SCr of 6.78 mg/dL (H)). Assessment/Plan:  
 
Principal Problem: 
  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 
  
Active Problems: 
  Aortic valve stenosis (1/10/2020) 
  S/p AVR 
  
  CAD (coronary artery disease) (1/10/2020) S/p CABG 
  
  Weaning from respirator [facility]) (1/10/2020)   
  
  Acute hypoxemic respiratory failure ([facility] [address] (1/10/2020) On O2 by NC 
  
   S/P AVR (1/10/2020)   
  
  Acute renal failure (ARF) ([address] (1/11/2020) On HD, nephrology following 
  
  Atrial fibrillation ([address] (1/13/2020) Intermittent, continue oral amiodarone, no BB with low BP at times 
  
  Shock [facility]) (1/15/2020) 
resolved 
  
  Bilateral pneumothorax (1/17/2020) 
resolved 
  
  Thrombocytopenia ([address] (1/17/2020) 
  Due to HIT, improving  
  
  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 
  stopping argatroban, continue coumadin 
  
  Pleural effusion (2/3/2020)   
  
  Depression Remeron added by hospitalist, monitor 
  
Dispo 
 will need STR Wayne Horn PA-C
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
## 2022-08-05 NOTE — PROGRESS NOTES
POD 19 Days Post-Op Procedure:  Procedure(s): CORONARY ARTERY BYPASS GRAFT WITH AVR/ (CABG X 3 )/ LIMA VEIN HARVEST/ GREATER SAPHENOUS 
ESOPHAGEAL TRANS ECHOCARDIOGRAM 
 
 
Subjective:  
 
Patient has No significant medical complaints Objective:  
 
Patient Vitals for the past 8 hrs: 
 BP Temp Pulse Resp SpO2 Weight  
20 0700 141/63  70 22 97 %   
20 0649      283 lb 1.1 oz (128.4 kg) 20 0600 126/60  72 (!) 46 97 %   
20 0500 111/52  70 16 99 %   
20 0400 97/50  78 (!) 37 96 %   
20 0300 96/49 98.4 °F (36.9 °C) 71 16 97 %   
20 0200 96/54  74 16 94 %   
20 0100 93/50  77 19 97 %  Temp (24hrs), Av.3 °F (36.8 °C), Min:97.8 °F (36.6 °C), Max:98.6 °F (37 °C) Hemodynamics PAP Systolic: 50 PAP 
CO (l/min): 7.5 l/min CO 
CI (l/min/m2): 2.9 l/min/m2 CI No intake/output data recorded.  1901 -  0700 In: 2499.7 [P.O.:1410; I.V.:1089.7] Out: 0679 [Urine:53; Drains:75] CT Drainage 
  
  
   
  total of all CT's Heart:  regular rate and rhythm, S1, S2 normal, no murmur, click, rub or gallop Lung:  clear to auscultation bilaterally Neuro: Grossly non focal 
Incisions: Clean, dry, and intact Labs: 
Recent Results (from the past 24 hour(s)) GLUCOSE, POC Collection Time: 20 11:26 AM  
Result Value Ref Range Glucose (POC) 157 (H) 65 - 100 mg/dL GLUCOSE, POC Collection Time: 20  5:28 PM  
Result Value Ref Range Glucose (POC) 264 (H) 65 - 100 mg/dL GLUCOSE, POC Collection Time: 20  9:01 PM  
Result Value Ref Range Glucose (POC) 195 (H) 65 - 100 mg/dL MAGNESIUM Collection Time: 20  3:07 AM  
Result Value Ref Range Magnesium 2.2 1.8 - 2.4 mg/dL METABOLIC PANEL, BASIC Collection Time: 20  3:07 AM  
Result Value Ref Range Sodium 139 136 - 145 mmol/L Potassium 4.4 3.5 - 5.1 mmol/L  Chloride 103 98 - 107 mmol/L  
 CO2 31 21 - 32 mmol/L  
 Anion gap 5 (L) 7 - 16 mmol/L Glucose 142 (H) 65 - 100 mg/dL BUN 33 (H) 8 - 23 MG/DL Creatinine 3.85 (H) 0.8 - 1.5 MG/DL  
 GFR est AA 20 (L) >60 ml/min/1.73m2 GFR est non-AA 16 (L) >60 ml/min/1.73m2 Calcium 8.2 (L) 8.3 - 10.4 MG/DL  
PTT Collection Time: 01/29/20  3:07 AM  
Result Value Ref Range aPTT 62.0 (H) 24.7 - 39.8 SEC PHOSPHORUS Collection Time: 01/29/20  3:07 AM  
Result Value Ref Range Phosphorus 3.8 (H) 2.3 - 3.7 MG/DL  
CBC W/O DIFF Collection Time: 01/29/20  3:07 AM  
Result Value Ref Range WBC 15.8 (H) 4.3 - 11.1 K/uL  
 RBC 2.90 (L) 4.23 - 5.6 M/uL HGB 8.8 (L) 13.6 - 17.2 g/dL HCT 28.3 (L) 41.1 - 50.3 % MCV 97.6 79.6 - 97.8 FL  
 MCH 30.3 26.1 - 32.9 PG  
 MCHC 31.1 (L) 31.4 - 35.0 g/dL  
 RDW 15.2 (H) 11.9 - 14.6 % PLATELET 423 (L) 574 - 450 K/uL MPV 11.6 9.4 - 12.3 FL ABSOLUTE NRBC 0.00 0.0 - 0.2 K/uL Assessment:  
 
Principal Problem: 
  CAD (coronary artery disease) (1/10/2020) Active Problems: Aortic valve stenosis (1/10/2020) Weaning from respirator [facility]) (1/10/2020) Acute hypoxemic respiratory failure ([address] (1/10/2020) S/P CABG x 3 (1/10/2020) S/P AVR (1/10/2020) Acute renal failure (ARF) ([address] (1/11/2020) Atrial fibrillation ([address] (1/13/2020) Shock ([address] (1/15/2020) Bilateral pneumothorax (1/17/2020) Thrombocytopenia ([facility] [address] (1/17/2020) HIT (heparin-induced thrombocytopenia) ([facility] [address] (1/23/2020) Plan/Recommendations/Medical Decision Making:  
 
plts 109K, treating HIT, ambulate, supportive care See orders Statement Selected

## 2022-12-08 NOTE — PROGRESS NOTES
Oximetry with Exercise RA Resting            85% 1L Restin% 2L Restin% 3L Restin% 3L with Exertion:   88% 4L with Exertion:   92% Patient is unable to ambulate becuase of the surgical removal of his toes. Patient requires 3L while resting and 4L while exerting himself. Patient reports using O2 at night through a concentrator at home. What Type Of Note Output Would You Prefer (Optional)?: Bullet Format Hpi Title: Evaluation of Skin Lesions

## 2023-03-01 NOTE — PERIOP NOTES
PLEASE CONTINUE TAKING ALL PRESCRIPTION MEDICATIONS UP TO THE DAY OF SURGERY UNLESS OTHERWISE DIRECTED BELOW. DISCONTINUE all vitamins and supplements 7days prior to surgery. DISCONTINUE Non-Steriodal Anti-Inflammatory (NSAIDS) such as Advil and Aleve 5 days prior to surgery. Home Medications to take  the day of surgery    allopurinol if needed, asa 81 mg, buspar if needed   tamsulosin   Use mupirocin ointment intranasally to both nostrils the night before and morning of surgery. Home Medications   to Hold   Ibuprofen        Comments   Take amiodarone 600 mg after 4 pm the day before surgery. Take metoprolol 12.5 mg after 4 pm the day before surgery. Bring incentive spirometer the day of surgery. Take 60 units of lantus insulin the morning of surgery. Do not remove green arm band. Please bring albuterol inhaler and bi-pap machine dos. Please do not bring home medications with you on the day of surgery unless otherwise directed by your nurse. If you are instructed to bring home medications, please give them to your nurse as they will be administered by the nursing staff. If you have any questions, please call [address] [place] [address] [phone] or [facility] [phone]. PAST SURGICAL HISTORY:  Skin tag

## 2023-07-20 NOTE — PROGRESS NOTES
Hospitalist Note Admit Date:  2020  3:03 PM  
Name:  Ángel Terry Age:  69 y.o. 
:  1945 MRN:  [id number] PCP:  Shikha Kumar MD 
Treatment Team: Attending Provider: Beny Lindquist MD; Consulting Provider: Nona Quach MD; Utilization Review: Katherine Blanca RN; Hospitalist: Josie Adrian NP; Hospitalist: Makeda Montejo NP; Consulting Provider: Deborah Yin MD; Consulting Provider: Velasquez Naidu MD; Care Manager: Heike Whittington.; Utilization Review: Kaushik Moss; Consulting Provider: Bart Bhardwaj MD; Consulting Provider: Marichuy Loya MD 
 
HPI/Subjective:  
56-year-old male admitted with acute hypoxemic respiratory failure and progressive left lower lobe consolidation. Infectious disease and pulmonology are following and applied the patient on vancomycin and cefepime for failure of levofloxacin therapy as an outpatient. Patient had a bronchoscopy on 217 per pulmonology and that final result is pending. Patient recently underwent CABG and aortic valve repair in 2020 and had resultant atrial fibrillation. Post CABG patient developed renal failure and is requiring hemodialysis for renal clearance. 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 Patient diagnosed with pneumonia post CABG and discharged home with levofloxacin. Today: No complaints or concerns today. Patient states that his respirations are improved. Nephrology plan to dialyze the patient today. Pulmonology feel the patient will likely need to have a thoracentesis in the near future therefore have coordinated with pharmacy to have the patient started on argatroban once his INR gets to the low end of 2. Patient was previously on argatroban at 0.5 mcg/kg/min and this will likely be the best starting dose for him. Pending placement to [facility]. Objective:  
 
Patient Vitals for the past 24 hrs: 
 Temp Pulse Resp BP SpO2  
20 1638  86  99/61  03/13/20 1632  (!) 103  102/67   
03/13/20 1616  86  102/59   
03/13/20 1557  86  115/67   
03/13/20 1525  79  101/59   
03/13/20 1453  81  (!) 88/43   
03/13/20 1432  82  100/48   
03/13/20 1429  82  (!) 82/40   
03/13/20 1403  85  92/43   
03/13/20 1345  92  107/63   
03/13/20 1125 97.8 °F (36.6 °C) 92 19 138/69 94 % 03/13/20 1048     97 % 03/13/20 1015    114/63   
03/13/20 0724 97.4 °F (36.3 °C) 78 28 140/71 98 % 03/13/20 0350 98.2 °F (36.8 °C) 86 19 113/67 98 % 03/13/20 0123     95 % 03/13/20 0015 98.3 °F (36.8 °C) 87 19 119/68 98 % 03/12/20 2013     95 % 03/12/20 1959     94 % 03/12/20 1857 98.4 °F (36.9 °C) 85 21 123/66 96 % 03/12/20 1726 98.2 °F (36.8 °C) 82 20 167/73 100 % Oxygen Therapy O2 Sat (%): 94 % (03/13/20 1125) Pulse via Oximetry: 90 beats per minute (03/13/20 1048) O2 Device: Hi flow nasal cannula (03/13/20 1048) PEEP/CPAP (cm H2O): 4 cm H20 (03/13/20 1048) O2 Flow Rate (L/min): 4 l/min (03/12/20 1959) O2 Temperature: 87.8 °F (31 °C) (03/10/20 0618) FIO2 (%): 35 % (03/13/20 0123) Estimated body mass index is 36.85 kg/m² as calculated from the following: 
  Height as of this encounter: 5' 10\" (1.778 m). Weight as of this encounter: 116.5 kg (256 lb 12.8 oz). Intake/Output Summary (Last 24 hours) at 3/13/2020 1652 Last data filed at 3/13/2020 1665 Gross per 24 hour Intake 448 ml Output 2250 ml Net -1802 ml *Note that automatically entered I/Os may not be accurate; dependent on patient compliance with collection and accurate  by techs. Physical Exam:  
General:     alert, awake, no acute distress. Obese. Head:   normocephalic, atraumatic Eyes, Ears, nose: PERRL, EOMI. NC Neck:    supple, non-tender Lungs:   Wheezing Cardiac:   RRR, Normal S1 and S2. Abdomen:   Soft, non distended, nontender, +BS Extremities:   Warm, dry. B/l stasis dermatitis with mild edema.  Left forearm and hand swelling Skin:   No rashes, no jaundice Neuro:  Alert and oriented to person, time, place, situation. No gross focal neurological deficit Psychiatric:  No anxiety, calm, cooperative Data Review: 
I have reviewed all labs, meds, and studies from the last 24 hours: 
 
Recent Results (from the past 24 hour(s)) GLUCOSE, POC Collection Time: 03/12/20  5:07 PM  
Result Value Ref Range Glucose (POC) 192 (H) 65 - 100 mg/dL GLUCOSE, POC Collection Time: 03/12/20  8:41 PM  
Result Value Ref Range Glucose (POC) 272 (H) 65 - 100 mg/dL GLUCOSE, POC Collection Time: 03/13/20  5:53 AM  
Result Value Ref Range Glucose (POC) 123 (H) 65 - 100 mg/dL PROTHROMBIN TIME + INR Collection Time: 03/13/20  7:10 AM  
Result Value Ref Range Prothrombin time 39.7 (H) 12.0 - 14.7 sec INR 3.9 (HH) METABOLIC PANEL, BASIC Collection Time: 03/13/20  7:10 AM  
Result Value Ref Range Sodium 141 136 - 145 mmol/L Potassium 4.0 3.5 - 5.1 mmol/L Chloride 106 98 - 107 mmol/L  
 CO2 32 21 - 32 mmol/L Anion gap 3 (L) 7 - 16 mmol/L Glucose 117 (H) 65 - 100 mg/dL BUN 18 8 - 23 MG/DL Creatinine 1.98 (H) 0.8 - 1.5 MG/DL  
 GFR est AA 43 (L) >60 ml/min/1.73m2 GFR est non-AA 35 (L) >60 ml/min/1.73m2 Calcium 8.5 8.3 - 10.4 MG/DL  
CBC W/O DIFF Collection Time: 03/13/20  7:10 AM  
Result Value Ref Range WBC 9.4 4.3 - 11.1 K/uL  
 RBC 3.04 (L) 4.23 - 5.6 M/uL HGB 8.8 (L) 13.6 - 17.2 g/dL HCT 28.8 (L) 41.1 - 50.3 % MCV 94.7 79.6 - 97.8 FL  
 MCH 28.9 26.1 - 32.9 PG  
 MCHC 30.6 (L) 31.4 - 35.0 g/dL  
 RDW 15.4 (H) 11.9 - 14.6 % PLATELET 715 127 - 160 K/uL MPV 10.0 9.4 - 12.3 FL ABSOLUTE NRBC 0.00 0.0 - 0.2 K/uL GLUCOSE, POC Collection Time: 03/13/20 11:24 AM  
Result Value Ref Range Glucose (POC) 247 (H) 65 - 100 mg/dL All Micro Results Procedure Component Value Units Date/Time CULTURE, RESPIRATORY/SPUTUM/BRONCH Emperatriz Locust STAIN [[id number]]  (Abnormal) Collected:  03/09/20 6931 Order Status:  Completed Specimen:  Sputum Updated:  03/12/20 5236 Special Requests: NO SPECIAL REQUESTS     
  GRAM STAIN 10 TO 50 WBC'S/OIF  
   0 TO 2 EPITHELIAL CELLS/OIF  
   FEW GRAM POSITIVE RODS MODERATE YEAST 4+ MUCUS PRESENT Culture result: MODERATE CANDIDA ALBICANS     
 CULTURE, BLOOD [[id number]] Collected:  03/06/20 0944 Order Status:  Completed Specimen:  Blood Updated:  03/11/20 7697 Special Requests: --     
  RIGHT Antecubital 
  
  Culture result: NO GROWTH 5 DAYS     
 CULTURE, BLOOD [[id number]] Collected:  03/06/20 2589 Order Status:  Completed Specimen:  Blood Updated:  03/11/20 6330 Special Requests: --     
  LEFT Antecubital 
  
  Culture result: NO GROWTH 5 DAYS     
 CULTURE, RESPIRATORY/SPUTUM/BRONCH Emperatriz Locust STAIN [[id number]] Collected:  03/06/20 1600 Order Status:  Canceled Specimen:  Sputum C. DIFFICILE AG & TOXIN A/B [[id number]] Order Status:  Canceled Specimen:  Stool CULTURE, RESPIRATORY/SPUTUM/BRONCH Iker Sav [[id number]] Collected:  02/17/20 1220 Order Status:  Completed Specimen:  Sputum from Bronchial Washing Updated:  02/24/20 1244 Special Requests: NO SPECIAL REQUESTS     
  GRAM STAIN 20 TO 35 WBCS SEEN PER OIF  
   1 TO 2 EPITHELIAL CELLS SEEN PER OIF  
      
  MODERATE GRAM POSITIVE COCCI  
     
   FEW GRAM POSITIVE RODS Culture result:    
  LIGHT NORMAL RESPIRATORY BRENNAN Current Meds: 
Current Facility-Administered Medications Medication Dose Route Frequency  furosemide (LASIX) injection 80 mg  80 mg IntraVENous BID  insulin lispro (HUMALOG) injection   SubCUTAneous AC&HS  
 dextrose 40% (GLUTOSE) oral gel 1 Tube  15 g Oral PRN  
 glucagon (GLUCAGEN) injection 1 mg  1 mg IntraMUSCular PRN  
 dextrose (D50W) injection syrg 12.5-25 g  25-50 mL IntraVENous PRN  
  HYDROcodone-homatropine (HYCODAN) 5-1.5 mg/5 mL (5 mL) syrup 5 mL  5 mL Oral Q4H PRN  
 benzonatate (TESSALON) capsule 100 mg  100 mg Oral TID PRN  
 guaiFENesin ER (MUCINEX) tablet 1,200 mg  1,200 mg Oral Q12H  
 albuterol (PROVENTIL VENTOLIN) nebulizer solution 2.5 mg  2.5 mg Nebulization Q6H RT  
 insulin glargine (LANTUS) injection 5 Units  5 Units SubCUTAneous QHS  mirtazapine (REMERON) tablet 7.5 mg  7.5 mg Oral QHS  sertraline (ZOLOFT) tablet 25 mg  25 mg Oral DAILY  ALPRAZolam (XANAX) tablet 0.25 mg  0.25 mg Oral Q6H PRN  
 sodium chloride (OCEAN) 0.65 % nasal squeeze bottle 2 Spray  2 Spray Both Nostrils Q2H PRN  
 morphine injection 2 mg  2 mg IntraVENous Q6H PRN  
 sodium chloride 3% hypertonic nebulizer soln  4 mL Nebulization BID RT  
 sodium chloride (OCEAN) 0.65 % nasal squeeze bottle 2 Spray  2 Spray Both Nostrils BID  epoetin maritza-epbx (RETACRIT) 14,000 Units combo injection  14,000 Units SubCUTAneous Q7D  
 polyethylene glycol (MIRALAX) packet 17 g  17 g Oral DAILY  midodrine (PROAMITINE) tablet 10 mg  10 mg Oral TID WITH MEALS  tamsulosin (FLOMAX) capsule 0.4 mg  0.4 mg Oral QHS  loperamide (IMODIUM) capsule 4 mg  4 mg Oral QID PRN  pantothenic ac-min oil-pet,hyd (AQUAPHOR) 41 % ointment   Topical DAILY  traMADol (ULTRAM) tablet 50 mg  50 mg Oral Q6H PRN  
 sodium chloride (NS) flush 5-40 mL  5-40 mL IntraVENous PRN  
 acetaminophen (TYLENOL) tablet 650 mg  650 mg Oral Q4H PRN  
 naloxone (NARCAN) injection 0.4 mg  0.4 mg IntraVENous PRN  
 ondansetron (ZOFRAN) injection 4 mg  4 mg IntraVENous Q4H PRN  
 bisacodyL (DULCOLAX) tablet 5 mg  5 mg Oral DAILY PRN Other Studies: 
 
Ct Chest W Cont Result Date: 2/14/2020 CTA OF THE CHEST - PE STUDY HISTORY: Acute shortness of breath COMPARISON: None TECHNIQUE: A helical acquisition was performed through the chest utilizing 2.84BS slice thickness during the infusion of 100 cc of Isovue-370. 3-D post-processed images were created on an independent workstation. Multiplanar reformats were obtained. The exam was focused on the pulmonary arteries. Dose reduction techniques used: Automated exposure control, adjustment of the mAs and/or kVp according to patient's size, and iterative reconstruction techniques. FINDINGS: *  PULMONARY VESSELS: No evidence of pulmonary embolism. *  PLEURA / PERICARDIUM: Left pleural effusion. *  CAROL / MEDIASTINUM: Dilated diffuse fluid-filled esophagus. No evidence of a hiatal hernia. *  LUNGS: Consolidation of the entire left lower lobe. Linear atelectasis of the right middle and right upper lobe. *  TRACHEOBRONCHIAL TREE: Within normal limits. *  AORTA: Within normal limits. *  CORONARY ARTERIES: Extensive coronary artery calcification is seen. *  CHEST WALL/AXILLA: Within normal limits. *  VISUALIZED UPPER ABDOMEN: Within normal limits. *  SPINE / BONES: Status post CABG. *  ADDITIONAL COMMENTS: None. IMPRESSION: No evidence of pulmonary embolism. Left lower lobe atelectasis or pneumonia with a left pleural effusion. Linear atelectasis of the right middle and right upper lobes. Dilated diffuse fluid-filled esophagus. No evidence of a hiatal hernia or obstructing mass. I question of the patient could have gastroesophageal reflux disease. Coronary artery disease status post CABG. Date of Dictation: 2/14/2020 12:25 AM 
 
Xr Chest [facility] Result Date: 2/13/2020 EXAMINATION: CHEST RADIOGRAPH 2/13/2020 3:55 PM ACCESSION NUMBER: [id number] INDICATION: shob COMPARISON: Chest x-ray 2/8/2020 TECHNIQUE: A single AP view of the chest was obtained. FINDINGS: Support Lines and Tubes: Unchanged cardiac pacemaker positioning. Unchanged left central venous catheter positioning. Cardiac Silhouette: Unchanged enlargement of the cardiac silhouette. Lungs: Unchanged left basilar opacity, moderate left pleural effusion. Improving interstitial edema.  Pleura: No pneumothorax. Osseous Structures: Prior median sternotomy. Upper Abdomen: Unremarkable. IMPRESSION: 1. Improving interstitial edema. 2. Unchanged left basilar opacity and left pleural effusion. 3. Unchanged enlargement of the cardiac silhouette. VOICE DICTATED BY: Dr. Jeramie Drummond Assessment and Plan:  
 
Hospital Problems as of 3/13/2020 Date Reviewed: 3/9/2020 Codes Class Noted - Resolved POA Acute on chronic respiratory failure with hypoxia [facility]) ICD-10-CM: R98.99 
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 
ICD-9-CM: 250.40, 583.81  8/26/2019 - Present Yes Obesity, morbid ([address] (Chronic) ICD-10-CM: E66.01 
ICD-9-CM: 278.01  10/19/2018 - Present Yes DM type 2 (diabetes mellitus, type 2) (HCC) (Chronic) ICD-10-CM: E11.9 ICD-9-CM: 250.00  1/14/2013 - Present Yes HLD (hyperlipidemia) (Chronic) ICD-10-CM: X66.3 ICD-9-CM: 272.4  1/14/2013 - Present Yes RESOLVED: Acute-on-chronic kidney injury ([facility] ICD-10-CM: N17.9, N18.9 ICD-9-CM: 584.9, 585.9  2/17/2020 - 2/25/2020 Yes RESOLVED: Atelectasis ICD-10-CM: J98.11 ICD-9-CM: 518.0  2/17/2020 - 2/25/2020 Yes RESOLVED: Fluid overload ICD-10-CM: E87.70 ICD-9-CM: 276.69  2/15/2020 - 2/25/2020 Yes RESOLVED: Pleural effusion ICD-10-CM: J90 ICD-9-CM: 511.9  2/3/2020 - 2/25/2020 Yes * (Principal) RESOLVED: Acute hypoxemic respiratory failure ([facility] ICD-10-CM: J96.01 
ICD-9-CM: 518.81  1/10/2020 - 2/25/2020 Yes RESOLVED: HTN (hypertension) ICD-10-CM: I10 
ICD-9-CM: 401.9  1/14/2013 - 2/25/2020 Yes Plan: 
Acute hypoxemic respiratory failure with Progressive LLL consolidation - ID on board: back on abx, vanc/cefepime EOT 3/13/2020.  
- S/p bronch 2/17. Pulm following. 
  
LUE pain 
- US : no DVT but Thrombosed superficial branch of the left cephalic vein. DEJUAN now on HD 
- HD per nephro 
  
DM2 
- sliding scale 
- holding Basal/prandial insulins with sugars reasonable though occasional spikes throughout the day 
  
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 
-Hold warfarin 
-Start argatroban on INR less than 2.5 to plan for thoracentesis per pulmonology 
  
CAD S/p CABG/AVR January 2020 AFib 
- Home meds. 
  
MDD - Per tele psych 
 - Decrease Buspar to 5mg TID x3 days (or 9 doses) then stop 
 - trazodone dc(suspected contributing to orthostatic hypotension?) - Start Remeron 7.5mg PO qhs; start Zoloft 25mg daily - DC Klonopin - Decrease Xanax to 0.25mg q6 prn Diet:  DIET DIABETIC CONSISTENT CARB 
DIET NUTRITIONAL SUPPLEMENTS 
DVT PPx: warfarin Code: DNR Signed By: Carlos Mi MD   
 March 13, 2020 [[id number]]

## 2023-08-14 NOTE — PROGRESS NOTES
03/16/20 0157 Oxygen Therapy O2 Sat (%) 97 % Pulse via Oximetry 88 beats per minute O2 Device CPAP mask FIO2 (%) 35 % Respiratory Respiratory (WDL) WDL Respiratory Pattern Regular Chest/Tracheal Assessment Chest expansion, symmetrical  
Breath Sounds Bilateral Clear;Diminished Cough Non-productive CPAP/BIPAP  
CPAP/BIPAP Start/Stop On Device Mode CPAP Mask Type and Size Full face Skin Condition intact PIP Observed 9 cm H20  
EPAP (cm H2O) 8 cm H2O Vt Spont (ml) 297 ml Ve Observed (l/min) 7.3 l/min Backup Rate 10 Total RR (Spontaneous) 25 breaths per minute Insp Rise Time (sec) 2 Leak (Estimated) 10 L/min  
Pt's Home Machine No  
Biomedical Check Performed Yes Settings Verified Yes Alarm Settings High Pressure 30 Low Pressure 2 Apnea 20 Low Ve 3 High Rate 50 Low Rate 10 Pulmonary Toilet Pulmonary Toilet H. O.B elevated Stage 3 Override Histology Text: Proliferation of atypical basaloid cells noted at 2, 6:30, and 7:30 to 8 o'clock.

## 2023-08-16 NOTE — PROGRESS NOTES
Patient had 3 small hard formed stools during the beginning of the shift patient believes he is having diarrhea and wants something to stop his bowel movements. Patient was given miralax prior to the start of my shift for constipation. Education provided to patient on constipation and intervention required when patient unable to have a BM after 3 days patient states he doesn't like being dependent on staff to assist him with toileting needs. Stressed to patient that nursing care consists of assisting patient's with toileting needs and encouraged patient to ask for assistance when needed patient verbalized understanding. Will continue to monitor. Unique Flap 1 Name: Myocutaneous Flap

## 2024-01-01 NOTE — PROGRESS NOTES
Warfarin dosing per pharmacist 
 
Deion Lopez is a 69 y.o. male. Height: 5' 10\" (177.8 cm)    Weight: 136.5 kg (300 lb 14.9 oz) Indication:  Prevention of thromboembolism (prosthetic heart valves) Goal INR:  2-3 Home dose:  7.5 mg MWF, 5 mg all other days Risk factors or significant drug interactions:  None Other anticoagulants:  N/A *History of HIT Daily Monitoring Date  INR     Warfarin dose HGB              Notes 5/25  7.4  Hold  8.5 Patient admitted yesterday with an INR of 4.8. Today it has risen to 7.4. Will hold warfarin dose tonight. Thank you, Linus Liz, PharmD Clinical Pharmacy PGY1 Resident [phone]

## 2025-04-03 NOTE — PROGRESS NOTES
It was brought to my attention that tele-psych was consulted on 2/26. Recommendations were made but adjustments do not not seem to have been made in the EMR. Official consult report is available in paper chart. I have made the following changes based on the tele-psych report: 1. Decrease Buspar to 5mg TID x3 days (or 9 doses) then stop 2. DC trazodone (suspected contributing to orthostatic hypotension?) 3. Start Remeron 7.5mg PO qhs; start Zoloft 25mg daily 4. DC Klonopin 5. Decrease Xanax to 0.25mg q6 prn 6. Check TSH, B12/folate Stephen Sullivan MD 
 Patient has been experiencing joint pain over the past year, progressive, associated with joint popping, muscle stiffness, muscle tightness.  Symptoms somewhat improved with gentle stretching.  Will obtain labs for further investigation.  Will start low-dose Flexeril.

## 2025-04-24 NOTE — ROUTINE PROCESS
Bedside and Verbal shift change report given to Yosvany Calvillo RN (oncoming nurse) by self (offgoing nurse). Report included the following information SBAR, Kardex, MAR and Recent Results. Dressings removed from bilateral lower extremities      Nigel Brown RN  04/24/25 [id number]

## 2025-05-16 NOTE — PROGRESS NOTES
Bedside and Verbal shift change report given to Prluncvick Trujillo (oncoming nurse) by self (offgoing nurse). Report included the following information SBAR, Kardex, MAR and Recent Results. What Type Of Note Output Would You Prefer (Optional)?: Standard Output Hpi Title: Evaluation of Skin Lesions Have Your Spot(S) Been Treated In The Past?: has not been treated

## 2025-05-20 NOTE — PROGRESS NOTES
[facility] PROGRESS NOTE Follow up for: DEJUAN/CKD Subjective:  
Patient seen and examined on rounds reclined in chair denies SOB and is comfortable, no new complaints. Chart and labs reviewed-cxr today LLL atelectasis or pneumonia with left pleural effusion, ROS: 
Gen - no fever, no chills, appetite okay CV - no chest pain, no orthopnea Lung - + exertional shortness of breath- better, no cough GI- no N/V/D Ext/Access - no edema Objective:  
Exam: 
Vitals:  
 03/23/20 0350 03/23/20 0701 03/23/20 0830 03/23/20 1053 BP: 129/61 100/62  105/58 Pulse: 81 85  89 Resp: 20 20 18 Temp: 98.2 °F (36.8 °C) 98.3 °F (36.8 °C)  97.5 °F (36.4 °C) SpO2: 95% 96% 94% 98% Weight:      
Height:      
 
 
 
Intake/Output Summary (Last 24 hours) at 3/23/2020 1141 Last data filed at 3/23/2020 0126 Gross per 24 hour Intake 240 ml Output 200 ml Net 40 ml  
 
 
Current Facility-Administered Medications Medication Dose Route Frequency  Warfarin on Hold   Other Rx Dosing/Monitoring  furosemide (LASIX) tablet 40 mg  40 mg Oral DAILY  alum-mag hydroxide-simeth (MYLANTA) oral suspension 30 mL  30 mL Oral Q4H PRN  
 sertraline (ZOLOFT) tablet 100 mg  100 mg Oral DAILY  buPROPion [facility]) tablet 75 mg  75 mg Oral BID  loratadine (CLARITIN) tablet 10 mg  10 mg Oral DAILY  diphenhydrAMINE (BENADRYL) capsule 25 mg  25 mg Oral Q6H PRN  mirtazapine (REMERON) tablet 15 mg  15 mg Oral QHS  ALPRAZolam (XANAX) tablet 0.5 mg  0.5 mg Oral Q6H PRN  
 bacitracin 500 unit/gram ointment   Topical BID  insulin lispro (HUMALOG) injection   SubCUTAneous AC&HS  
 dextrose 40% (GLUTOSE) oral gel 1 Tube  15 g Oral PRN  
 glucagon (GLUCAGEN) injection 1 mg  1 mg IntraMUSCular PRN  
 dextrose (D50W) injection syrg 12.5-25 g  25-50 mL IntraVENous PRN  
 HYDROcodone-homatropine (HYCODAN) 5-1.5 mg/5 mL (5 mL) syrup 5 mL  5 mL Oral Q4H PRN  
 PCA pump initiated.  Patient educated on how to use.     benzonatate (TESSALON) capsule 100 mg  100 mg Oral TID PRN  
 guaiFENesin ER (MUCINEX) tablet 1,200 mg  1,200 mg Oral Q12H  
 albuterol (PROVENTIL VENTOLIN) nebulizer solution 2.5 mg  2.5 mg Nebulization Q6H RT  
 insulin glargine (LANTUS) injection 5 Units  5 Units SubCUTAneous QHS  sodium chloride (OCEAN) 0.65 % nasal squeeze bottle 2 Spray  2 Spray Both Nostrils Q2H PRN  
 morphine injection 2 mg  2 mg IntraVENous Q6H PRN  
 sodium chloride 3% hypertonic nebulizer soln  4 mL Nebulization BID RT  
 sodium chloride (OCEAN) 0.65 % nasal squeeze bottle 2 Spray  2 Spray Both Nostrils BID  epoetin maritza-epbx (RETACRIT) 14,000 Units combo injection  14,000 Units SubCUTAneous Q7D  
 polyethylene glycol (MIRALAX) packet 17 g  17 g Oral DAILY  midodrine (PROAMITINE) tablet 10 mg  10 mg Oral TID WITH MEALS  tamsulosin (FLOMAX) capsule 0.4 mg  0.4 mg Oral QHS  loperamide (IMODIUM) capsule 4 mg  4 mg Oral QID PRN  pantothenic ac-min oil-pet,hyd (AQUAPHOR) 41 % ointment   Topical DAILY  traMADol (ULTRAM) tablet 50 mg  50 mg Oral Q6H PRN  
 sodium chloride (NS) flush 5-40 mL  5-40 mL IntraVENous PRN  
 acetaminophen (TYLENOL) tablet 650 mg  650 mg Oral Q4H PRN  
 naloxone (NARCAN) injection 0.4 mg  0.4 mg IntraVENous PRN  
 ondansetron (ZOFRAN) injection 4 mg  4 mg IntraVENous Q4H PRN  
 bisacodyL (DULCOLAX) tablet 5 mg  5 mg Oral DAILY PRN  
 
 
EXAM 
GEN - Alert, oriented, in no distress CV - regular rate, S1, S2, no Rub Lung - mostly clear, left base diminished, no wheezes Abd - soft, nontender, BS present Ext/ Access - no edema, Left TCC- intact Recent Labs  
  03/22/20 
6929 WBC 9.9 HGB 10.9* HCT 35.0*  
 Recent Labs  
  03/23/20 
0536 03/22/20 
0622 03/21/20 
3886 * 135* 136  
K 4.1 4.2 4.1 CL 99 100 100 CO2 27 27 28 BUN 34* 22 33* CREA 5.19* 4.05* 5.11* CA 8.2* 8.8 8.6 * 171* 90 Assessment and Plan: 1) DEJUAN on CKD-  No sign of recovery. HD dependent since Jan, '20- likely ESRD 
-next HD tomorrow AM for volume and clearance and then again on Wed if he transfers to [facility]- pending admission to 4th floor per CW 2) Hypotension-  On midodrine prior to dialysis 3)  Hypoxic respiratory failure- on 3L O2, cxr low lung volumes with airspace consolidation left mid and lower lung. -ECHO 3/17 EF 55-60%, mild aortic stenosis 4) HIT positive- on argatroban Jacque Guerrero NP
